# Patient Record
Sex: FEMALE | Race: WHITE | NOT HISPANIC OR LATINO | Employment: OTHER | URBAN - METROPOLITAN AREA
[De-identification: names, ages, dates, MRNs, and addresses within clinical notes are randomized per-mention and may not be internally consistent; named-entity substitution may affect disease eponyms.]

---

## 2017-03-13 ENCOUNTER — GENERIC CONVERSION - ENCOUNTER (OUTPATIENT)
Dept: OTHER | Facility: OTHER | Age: 82
End: 2017-03-13

## 2017-06-01 ENCOUNTER — GENERIC CONVERSION - ENCOUNTER (OUTPATIENT)
Dept: OTHER | Facility: OTHER | Age: 82
End: 2017-06-01

## 2017-07-19 ENCOUNTER — ALLSCRIPTS OFFICE VISIT (OUTPATIENT)
Dept: OTHER | Facility: OTHER | Age: 82
End: 2017-07-19

## 2017-07-19 DIAGNOSIS — R30.0 DYSURIA: ICD-10-CM

## 2017-07-19 DIAGNOSIS — T78.3XXA ANGIONEUROTIC EDEMA: ICD-10-CM

## 2017-07-27 ENCOUNTER — ALLSCRIPTS OFFICE VISIT (OUTPATIENT)
Dept: OTHER | Facility: OTHER | Age: 82
End: 2017-07-27

## 2017-08-03 ENCOUNTER — ALLSCRIPTS OFFICE VISIT (OUTPATIENT)
Dept: OTHER | Facility: OTHER | Age: 82
End: 2017-08-03

## 2017-08-07 ENCOUNTER — APPOINTMENT (OUTPATIENT)
Dept: LAB | Facility: CLINIC | Age: 82
End: 2017-08-07
Payer: MEDICARE

## 2017-08-07 ENCOUNTER — TRANSCRIBE ORDERS (OUTPATIENT)
Dept: LAB | Facility: CLINIC | Age: 82
End: 2017-08-07

## 2017-08-07 DIAGNOSIS — T78.3XXA ANGIONEUROTIC EDEMA: ICD-10-CM

## 2017-08-07 LAB
BASOPHILS # BLD AUTO: 0.02 THOUSANDS/ΜL (ref 0–0.1)
BASOPHILS NFR BLD AUTO: 0 % (ref 0–1)
EOSINOPHIL # BLD AUTO: 0.63 THOUSAND/ΜL (ref 0–0.61)
EOSINOPHIL NFR BLD AUTO: 5 % (ref 0–6)
ERYTHROCYTE [DISTWIDTH] IN BLOOD BY AUTOMATED COUNT: 14.3 % (ref 11.6–15.1)
HCT VFR BLD AUTO: 49.5 % (ref 34.8–46.1)
HGB BLD-MCNC: 15.9 G/DL (ref 11.5–15.4)
LYMPHOCYTES # BLD AUTO: 1.69 THOUSANDS/ΜL (ref 0.6–4.47)
LYMPHOCYTES NFR BLD AUTO: 13 % (ref 14–44)
MCH RBC QN AUTO: 27.8 PG (ref 26.8–34.3)
MCHC RBC AUTO-ENTMCNC: 32.1 G/DL (ref 31.4–37.4)
MCV RBC AUTO: 87 FL (ref 82–98)
MONOCYTES # BLD AUTO: 0.59 THOUSAND/ΜL (ref 0.17–1.22)
MONOCYTES NFR BLD AUTO: 5 % (ref 4–12)
NEUTROPHILS # BLD AUTO: 9.59 THOUSANDS/ΜL (ref 1.85–7.62)
NEUTS SEG NFR BLD AUTO: 77 % (ref 43–75)
NRBC BLD AUTO-RTO: 0 /100 WBCS
PLATELET # BLD AUTO: 196 THOUSANDS/UL (ref 149–390)
PMV BLD AUTO: 9.7 FL (ref 8.9–12.7)
RBC # BLD AUTO: 5.71 MILLION/UL (ref 3.81–5.12)
WBC # BLD AUTO: 12.6 THOUSAND/UL (ref 4.31–10.16)

## 2017-08-07 PROCEDURE — 82785 ASSAY OF IGE: CPT

## 2017-08-07 PROCEDURE — 36415 COLL VENOUS BLD VENIPUNCTURE: CPT

## 2017-08-07 PROCEDURE — 86003 ALLG SPEC IGE CRUDE XTRC EA: CPT

## 2017-08-07 PROCEDURE — 85025 COMPLETE CBC W/AUTO DIFF WBC: CPT

## 2017-08-08 ENCOUNTER — GENERIC CONVERSION - ENCOUNTER (OUTPATIENT)
Dept: OTHER | Facility: OTHER | Age: 82
End: 2017-08-08

## 2017-08-08 LAB
A-LACTALB IGE QN: 1.77 KAU/I
ALLERGEN COMMENT: ABNORMAL
ALMOND IGE QN: <0.1 KUA/I
B-LACTOGLOB IGE QN: 0.96 KAU/I
CASEIN IGE QN: 0.45 KAU/I
CASHEW NUT IGE QN: <0.1 KUA/I
CODFISH IGE QN: <0.1 KUA/I
EGG WHITE IGE QN: 1.54 KUA/I
GLUTEN IGE QN: <0.1 KUA/I
HAZELNUT IGE QN: <0.1 KUA/L
MILK IGE QN: 1.8 KUA/I
OVALB IGE QN: <0.1 KAU/I
OVOMUCOID IGE QN: <0.1 KAU/I
PEANUT IGE QN: <0.1 KUA/I
SALMON IGE QN: <0.1 KUA/I
SCALLOP IGE QN: <0.1 KUA/L
SESAME SEED IGE QN: <0.1 KUA/I
SHRIMP IGE QN: <0.1 KUA/L
SOYBEAN IGE QN: <0.1 KUA/I
TOTAL IGE SMQN RAST: 86.2 KU/L (ref 0–113)
TUNA IGE QN: <0.1 KUA/I
WALNUT IGE QN: <0.1 KUA/I
WHEAT IGE QN: 0.11 KUA/I

## 2017-10-17 ENCOUNTER — GENERIC CONVERSION - ENCOUNTER (OUTPATIENT)
Dept: OTHER | Facility: OTHER | Age: 82
End: 2017-10-17

## 2017-10-17 DIAGNOSIS — R06.02 SHORTNESS OF BREATH: ICD-10-CM

## 2017-10-20 ENCOUNTER — ALLSCRIPTS OFFICE VISIT (OUTPATIENT)
Dept: OTHER | Facility: OTHER | Age: 82
End: 2017-10-20

## 2017-12-06 ENCOUNTER — ALLSCRIPTS OFFICE VISIT (OUTPATIENT)
Dept: OTHER | Facility: OTHER | Age: 82
End: 2017-12-06

## 2017-12-06 ENCOUNTER — TRANSCRIBE ORDERS (OUTPATIENT)
Dept: ADMINISTRATIVE | Facility: HOSPITAL | Age: 82
End: 2017-12-06

## 2017-12-06 DIAGNOSIS — J44.9 OBSTRUCTIVE CHRONIC BRONCHITIS WITHOUT EXACERBATION (HCC): Primary | ICD-10-CM

## 2017-12-07 NOTE — CONSULTS
Assessment  1  Angioedema (995 1) (T78 3XXA)   2  Chronic obstructive pulmonary disease (496) (J44 9)    Plan  Chronic obstructive pulmonary disease    · Complete PFT with [facility]CO; Status:Hold For - Scheduling; Requested for:25Vmf2110;    Perform:[facility]; Due:43Tyy6580; Ordered;obstructive pulmonary disease; Ordered By:Irina Marino Re;   · Follow-up visit in 1 month Evaluation and Treatment  Follow-up  Status: Hold For -Scheduling  Requested for: 06OLD8119   Ordered;Chronic obstructive pulmonary disease; Ordered By: Lazarus Ransom Performed:  Due: 74PLK0680    Results/Data  Results Free Text Form [facility]:   Results  Chest X-ray done 6/16 at [facility], [facility]  Emphysema  Discussion/Summary  Discussion Summary:   I have reviewed this with the patient and her friend who is also her   Clinically she does have chronic obstructive pulmonary disease which is probably moderate to severe  However that does not explain her current symptoms which are intermittent in nature and mostly suggestive of angioedema  is currently on a nebulizer at home with ipratropium and albuterol and also has a Spiriva inhaler to use  I have scheduled the patient for complete pulmonary function studies although again I do not think this is her current problem   have recommended that she have an allergy evaluation which she tells me is being scheduled by her family [de-identified] call her with the results of the PFTs and I will see her back after that  Goals and Barriers: The patient has the current Goals: Breathe better  The patent has the current Barriers: Copd  Patient's Capacity to Self-Care: Patient is able to Self-Care  Patient Education: Educational resources provided: I have spoken to the patient and made recommendations and have given her advice regarding her chronic obstructive pulmonary disease  Medication SE Review and Pt Understands Tx: The treatment plan was reviewed with the patient/guardian   The patient/guardian understands and agrees with the treatment plan      Active Problems     · Abnormal weight loss (783 21) (R63 4)   · Acute anaphylaxis, initial encounter (995 0) (T78 2XXA)   · Acute bronchitis (466 0) (J20 9)   · Acute sinusitis (461 9) (J01 90)   · Allergic rhinitis (477 9) (J30 9)   · Anaphylactic reaction (995 0) (T78 2XXA)   · Angioedema (995 1) (T78 3XXA)   · Angioneurotic edema (995 1) (T78 3XXA)   · Arthralgia of left elbow (719 42) (M25 522)   · Arthritis, infective, 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)   · Asymptomatic menopausal state (V49 81) (Z78 0)   · Denied: History of Bladder Cancer (188 9)   · Bladder Polyps (236 7)   · Chronic cystitis (595 2) (N30 20)   · Chronic obstructive pulmonary disease (496) (J44 9)   · Cough (786 2) (R05)   · Deviated nasal septum (470) (J34 2)   · Drug eruption (693 0) (L27 0)   · Dyspnea (786 09) (R06 00)   · Dysuria (788 1) (R30 0)   · Elbow osteomyelitis, 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)   · Encounter for screening colonoscopy (V76 51) (Z12 11)   · Encounter for screening mammogram for malignant neoplasm of breast (V76 12)(Z12 31)   · 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)   · Gout (274 9) (M10 9)   · Gross hematuria (599 71) (R31 0)   · Herpes zoster (053 9) (B02 9)   · Hives (708 9) (L50 9)   · Hypertrophy, nasal, turbinate (478 0) (J34 3)   · Iliotibial band syndrome of left side (728 89) (M76 32)   · Impacted cerumen of left ear (380 4) (H61 22)   · Medial epicondylitis (726 31) (M77 00)   · 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)   · Need for revaccination (V05 9) (Z23)   · Screening for depression (V79 0) (Z13 89)   · Screening for genitourinary condition (V81 6) (Z13 89)   · Screening for neurological condition (V80 09) (Z13 89)   · Septic olecranon bursitis of left elbow (726 33,041 9) (M71 122)   · Shortness of breath (786 05) (R06 02)   · Sinus pressure (478 19) (J34 89)   · Sinusitis (473 9) (J32 9)   · Strain of left triceps tendon (840 8) (A80 509K)   · Urticaria (708 9) (L50 9)   · Vocal cord dysfunction (478 5) (J38 3)    History of Present Illness  HPI: I saw this patient in the office today her history of chronic obstructive pulmonary disease as well as a history of intermittent facial swelling including swelling of the tongue and throat are she has difficulty breathing  She has been having these episodes for the past 2 years  She attributes the beginning of this from an insect bite  She has been on prednisone on and off for the past 2 years and has actually developed visual problems from being on the prednisone  does have a history of smoking for 65 pack years  She tells me she quit about 3 years ago  She has no increased cough or sputum production  She has no hemoptysis or wheezing  She has some shortness of breath although tells me it is no worse now than it has been in the past       Review of Systems  Complete-Female - Pulm:  Constitutional: feeling poorly-- and-- feeling tired, but-- No fever, no chills, feels well, no tiredness, no recent weight gain or weight loss  Eyes: eyesight problems, but-- no complaints of vision problems  ENT: no rhinitis, no PND, no epistaxis  Cardiovascular: no palpitations, no chest pain  Respiratory: as noted in HPI  Past Medical History  1  Denied: History of Bladder Cancer (188 9)   2  Denied: History of mental disorder   3  History of nicotine dependence (V15 82) (Z87 891)   4  Denied: History of substance abuse  Active Problems And Past Medical History Reviewed: The active problems and past medical history were reviewed and updated today  Surgical History  1  History of  Section   2  History of Colon Surgery   3  History of Cystoscopy With Resection Of Tumor   4  History of Diagnostic Cystoscopy  Surgical History Reviewed: The surgical history was reviewed and updated today  Family History  Mother    1  Family history of Heart Disease (V17 49)   2  Family history of Stroke Syndrome (V17 1)  Father    3  Family history of Heart Disease (V17 49)  Family History    4  Denied: Family history of mental disorder   5  Denied: Family history of substance abuse    Social History     · Denied: History of Alcohol   · Current smoker (305 1) (F17 200)   · Former smoker (U65 94) (X37 369)  Social History Reviewed: The social history was reviewed and updated today  Current Meds   1  EpiPen 2-Ned 0 3 MG/0 3ML Injection Solution Auto-injector; Therapy: 74BUY2576 to Recorded   2  Eye Drops SOLN; Therapy: (Recorded:84Dog1605) to Recorded   3  Flovent Diskus 100 MCG/BLIST Inhalation Aerosol Powder Breath Activated; USE ONE INHALATION TWICE A DAY; Therapy: 64LTH5960 to (Evaluate:16Nov2017)  Requested for: 80USW7353; Last Rx:17Oct2017 Ordered   4  Fluticasone Propionate 50 MCG/ACT Nasal Suspension; USE 2 SPRAYS IN EACH NOSTRIL ONCE DAILY; Therapy: 18VGS5030 to (Jessica Layne)  Requested for: 92FLQ4307; Last Rx:20Oct2017 Ordered   5  Ipratropium-Albuterol 0 5-2 5 (3) MG/3ML Inhalation Solution; USE 1 UNIT DOSE IN NEBULIZER 4 TIMES DAILY; Therapy: 86MUW0374 to (Evaluate:15Apr2018)  Requested for: 42AAZ3245; Last Rx:17Oct2017 Ordered   6  LevoFLOXacin 500 MG Oral Tablet; TAKE 1 TABLET DAILY AS DIRECTED; Therapy: 22LUN4514 to (77 873 135)  Requested for: 95LJP4857; Last Rx:17Oct2017 Ordered   7  Mometasone Furoate 0 1 % External Ointment; APPLY SPARINGLY TO AFFECTED AREA(S) TWICE DAILY; Last Rx:67Jcq4732 Ordered   8  PredniSONE 20 MG Oral Tablet; 3 Tabs PO QDay x 4 Days 2 Tabs PO QDay x 4 Days 1 Tab PO QDay x 4 Days 1/2 Tab PO QDay x4Days; Therapy: 38COX9502 to (Complete:36Grw2748)  Requested for: 17WYO8153; Last Rx:10Nov2017 Ordered   9  Spiriva HandiHaler 18 MCG Inhalation Capsule; INHALE CONTENTS OF 1 CAPSULE ONCE DAILY; Therapy: 36QUC4072 to (Evaluate:07Viw8154)  Requested for: 08Ejw4869; Last Rx:97Hqb0991 Ordered   10   Ventolin  (90 Base) MCG/ACT Inhalation Aerosol Solution; INHALE 1 TO 2 PUFFS  EVERY 4 TO 6 HOURS AS NEEDED; Therapy: 72HAK5959 to (Evaluate:20Agu7891)  Requested for: 33Bvo6117; Last  Rx:77Fpq5966 Ordered   11  ZyrTEC Allergy 10 MG Oral Tablet; 2 in am and 2 at night; Therapy: 50LGP7671 to Recorded    Allergies  1  No Known Drug Allergies  2  Seasonal    Vitals  Vital Signs    Recorded: 62DAY2179 11:44AM   Heart Rate 078   Systolic 437   Diastolic 80   Height 5 ft    Weight 158 lb    BMI Calculated 30 86   BSA Calculated 1 69   O2 Saturation 89, RA       Physical Exam   Constitutional  General appearance: Abnormal   chronically ill-- and-- appears older than stated age  Eyes  Examination of pupil and irises: Abnormal-- (bilateral arcus senilis)  Ears, Nose, Mouth, and Throat  Nasal mucosa, septum, and turbinates: Normal without edema or erythema  Lips, teeth, and gums: Normal, good dentition  Oropharynx: Normal with no erythema, edema, exudate or lesions  Neck  Neck: Supple, symmetric, trachea midline, no masses  Jugular veins: Normal    Pulmonary  Auscultation of lungs: Abnormal   Auscultation of the lungs revealed decreased breath sounds diffusely  Cardiovascular  Auscultation of heart: Normal rate and rhythm, normal S1 and S2, no murmurs  Examination of extremities for edema and/or varicosities: Normal    Abdomen  Abdomen: Abnormal   The abdomen was rounded  Lymphatic  Palpation of lymph nodes in neck: No lymphadenopathy  Musculoskeletal  Gait and station: Normal    Digits and nails: Normal without clubbing or cyanosis  Neurologic  Mental Status: Normal  Not confused, no evidence of dementia, good comprehension, good concentration  Skin  Skin and subcutaneous tissue: Limited exam shows no rash  Psychiatric  Orientation to person, place and time: Normal    Mood and affect: Normal        Future Appointments    Date/Time Provider Specialty Site   12/22/2017 10:00 AM NEISHA Ying   Otolaryngology [address] [facility]       Signatures   Electronically signed by : Curtis Benitez M D ; Dec  6 2017 12:38PM EST                       (Author)

## 2017-12-13 ENCOUNTER — GENERIC CONVERSION - ENCOUNTER (OUTPATIENT)
Dept: PULMONOLOGY | Facility: HOSPITAL | Age: 82
End: 2017-12-13

## 2017-12-13 ENCOUNTER — HOSPITAL ENCOUNTER (OUTPATIENT)
Dept: PULMONOLOGY | Facility: HOSPITAL | Age: 82
Discharge: HOME | End: 2017-12-13
Attending: INTERNAL MEDICINE
Payer: MEDICARE

## 2017-12-13 DIAGNOSIS — J44.9 OBSTRUCTIVE CHRONIC BRONCHITIS WITHOUT EXACERBATION (HCC): ICD-10-CM

## 2017-12-13 PROCEDURE — 94729 DIFFUSING CAPACITY: CPT

## 2017-12-13 PROCEDURE — 94060 EVALUATION OF WHEEZING: CPT

## 2017-12-13 PROCEDURE — 94727 GAS DIL/WSHOT DETER LNG VOL: CPT

## 2017-12-13 PROCEDURE — 94760 N-INVAS EAR/PLS OXIMETRY 1: CPT

## 2017-12-13 RX ORDER — ALBUTEROL SULFATE 2.5 MG/3ML
2.5 SOLUTION RESPIRATORY (INHALATION) ONCE AS NEEDED
Status: DISCONTINUED | OUTPATIENT
Start: 2017-12-13 | End: 2017-12-17 | Stop reason: HOSPADM

## 2017-12-19 ENCOUNTER — GENERIC CONVERSION - ENCOUNTER (OUTPATIENT)
Dept: OTHER | Facility: OTHER | Age: 82
End: 2017-12-19

## 2018-01-10 NOTE — RESULT NOTES
Verified Results  (1) URIC ACID 20Jul2016 11:07AM Tashia Bonilla Order Number: [id number]   Order Number: [id number]     Test Name Result Flag Reference   URIC ACID 2 8 mg/dL  2 0-6 8   Specimen collection should occur prior to Metamizole administration due to the potential for falsely depressed results  (1) CBC/PLT/DIFF 20Jul2016 11:07AM Tashia Bonilla Order Number: [id number]     Test Name Result Flag Reference   WBC COUNT 11 24 Thousand/uL H 4 31-10 16   RBC COUNT 5 16 Million/uL H 3 81-5 12   HEMOGLOBIN 13 3 g/dL  11 5-15 4   HEMATOCRIT 42 7 %  34 8-46  1   MCV 83 fL  82-98   MCH 25 8 pg L 26 8-34 3   MCHC 31 1 g/dL L 31 4-37 4   RDW 15 7 % H 11 6-15 1   MPV 9 4 fL  8 9-12 7   PLATELET COUNT 797 Thousands/uL  149-390   - Patient Instructions: This bloodwork is non-fasting  Please drink two glasses of water morning of bloodwork  This is an appended report  These results have been appended to a previously verified report  NEUTROPHILS - REL 83 % H 43-75   This is an appended report  These results have been appended to a previously final verified report  LYMPHOCYTES - REL 11 % L 14-44   This is an appended report  These results have been appended to a previously final verified report  MONOCYTES - REL 6 %  4-12   This is an appended report  These results have been appended to a previously final verified report  TOTAL COUNTED      PLT ESTIMATE Adequate  Adequate   - Patient Instructions: This bloodwork is non-fasting  Please drink two glasses of water morning of bloodwork       (1) SED RATE 20Jul2016 11:07AM Tashia Chad Order Number: [id number]     Test Name Result Flag Reference   SED RATE 26 mm/hour H 0-20     (1) SYNOVIAL FLUID CRYSTALS 20Jul2016 11:07AM Tashiachinmay Bonilla Order Number: [id number]     Test Name Result Flag Reference   SYN FL CRYST No Crystals Seen  No Crystals Seen     (1) WBC AND DIFF,SYNOVIAL FLUID 20Jul2016 11: Kyra Marie Order Number: [id number]     Test Name Result Flag Reference   POLYS FLUID 93 %     MONOCYTES FLUID 7 %     SITE      WBC FLUID 252020 Girma Torres

## 2018-01-11 NOTE — MISCELLANEOUS
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)   8  Asymptomatic menopausal state (V49 81) (Z78 0)   9  Denied: History of Bladder Cancer (188 9)   10  Bladder Polyps (236 7)   11  Chronic cystitis (595 2) (N30 20)   12  Chronic obstructive pulmonary disease (496) (J44 9)   13  Cough (786 2) (R05)   14  Drug eruption (693 0) (L27 0)   15  Dyspnea (786 09) (R06 00)   16  Dysuria (788 1) (R30 0)   17  Elbow osteomyelitis, 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)   18  Encounter for screening colonoscopy (V76 51) (Z12 11)   19  Encounter for screening mammogram for malignant neoplasm of breast (V76 12)    (Z12 31)   20  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)   21  Gout (274 9) (M10 9)   22  Gross hematuria (599 71) (R31 0)   23  Herpes zoster (053 9) (B02 9)   24  Hives (708 9) (L50 9)   25  Iliotibial band syndrome of left side (728 89) (M76 32)   26  Medial epicondylitis (726 31) (M77 00)   27  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)   28  Need for revaccination (V05 9) (Z23)   29  Screening for depression (V79 0) (Z13 89)   30  Septic olecranon bursitis of left elbow (726 33,041 9) (M70 22,B96 89)   31  Shortness of breath (786 05) (R06 02)   32  Sinus pressure (478 19) (J34 89)   33  Sinusitis (473 9) (J32 9)   34  Strain of left triceps tendon (840 8) (S46 312A)   35  Urticaria (708 9) (L50 9)    Current Meds   1  EpiPen 2-Ned 0 3 MG/0 3ML Injection Solution Auto-injector; Therapy: 37ARS5251 to Recorded   2   Eye Drops SOLN;   Therapy: (Recorded:28Kup6865) to Recorded   3  Ipratropium-Albuterol 0 5-2 5 (3) MG/3ML Inhalation Solution; USE 1 UNIT DOSE IN   NEBULIZER 4 TIMES DAILY; Therapy: 42OPD8566 to (Evaluate:18Nov2017)  Requested for: 05OEG8796; Last   Rx:22Uxf2093 Ordered   4  Mometasone Furoate 0 1 % External Ointment; APPLY SPARINGLY TO AFFECTED   AREA(S) TWICE DAILY; Last Rx:66Hhm8189 Ordered   5  Ventolin  (90 Base) MCG/ACT Inhalation Aerosol Solution; INHALE 1 TO 2   PUFFS EVERY 4 TO 6 HOURS AS NEEDED; Therapy: 70FCC2170 to (Evaluate:12Jun2017)  Requested for: 53MWI1735; Last   Rx:64Rkd2478 Ordered    Allergies    1  No Known Drug Allergies    2   Seasonal    Signatures   Electronically signed by : Romana Lisle, ; Jun 1 2017 10:43AM EST                       (Author)

## 2018-01-12 ENCOUNTER — OFFICE VISIT (OUTPATIENT)
Dept: URGENT CARE | Facility: CLINIC | Age: 83
End: 2018-01-12
Payer: MEDICARE

## 2018-01-12 PROCEDURE — G0463 HOSPITAL OUTPT CLINIC VISIT: HCPCS

## 2018-01-12 PROCEDURE — 99213 OFFICE O/P EST LOW 20 MIN: CPT

## 2018-01-12 NOTE — CONSULTS
Chief Complaint  Chief Complaint Free Text Note Form: ALLERGIES/REF BY DR Farhad Cabral      History of Present Illness  HPI: Milagros Westfall is a [de-identified] YOF presents for evaluation of allergies  Reports difficulty breathing around her carpet and thinks she is allergic to dust  Reports ongoing nasal congestion  Reports difficulty breathing around strong cleaning solution odors  Denies prior allergy testing ,itchy/watery eyes, sneezing and rhinorrhea  Former smoker of 65 years  Reports poor vision from prior prednisone use  Review of Systems  Complete ENT ROS [facility]:   Eyes: vision change  Ears: hearing loss  Nose: SORES BLOCKED  Mouth: No sores in mouth, no altered taste, no dental problems  Throat: STARTS TO CLOSE  Neck: No neck soreness, no neck pain, no neck lumps or swelling  Genitourinary: No complaints of dysuria, flank pain or frequent urination  Cardiovascular: No complaints of chest pain or palpitations  Respiratory: shortness of breath  Gastrointestinal: No complaints of heartburn, nausea/vomiting, or constipation  Neurological: No complaints of headache, convulsions or memory loss  ROS Reviewed:   ROS reviewed  Active Problems    1  Abnormal weight loss (783 21) (R63 4)   2  Acute anaphylaxis, initial encounter (995 0) (T78 2XXA)   3  Acute bronchitis (466 0) (J20 9)   4  Acute sinusitis (461 9) (J01 90)   5  Anaphylactic reaction (995 0) (T78 2XXA)   6  Angioedema (995 1) (T78 3XXA)   7  Angioneurotic edema (995 1) (T78 3XXA)   8  Arthralgia of left elbow (719 42) (M25 522)   9  Arthritis, infective, 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)   10  Asymptomatic menopausal state (V49 81) (Z78 0)   11  Denied: History of Bladder Cancer (188 9)   12  Bladder Polyps (236 7)   13  Chronic cystitis (595 2) (N30 20)   14  Chronic obstructive pulmonary disease (496) (J44 9)   15  Cough (786 2) (R05)   16  Drug eruption (693 0) (L27 0)   17  Dyspnea (786 09) (R06 00)   18  Dysuria (788 1) (R30 0)   19   Elbow osteomyelitis, 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)   20  Encounter for screening colonoscopy (V76 51) (Z12 11)   21  Encounter for screening mammogram for malignant neoplasm of breast (V76 12)    (Z12 31)   22  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)   23  Gout (274 9) (M10 9)   24  Gross hematuria (599 71) (R31 0)   25  Herpes zoster (053 9) (B02 9)   26  Hives (708 9) (L50 9)   27  Iliotibial band syndrome of left side (728 89) (M76 32)   28  Medial epicondylitis (726 31) (M77 00)   29  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)   30  Need for revaccination (V05 9) (Z23)   31  Screening for depression (V79 0) (Z13 89)   32  Screening for genitourinary condition (V81 6) (Z13 89)   33  Screening for neurological condition (V80 09) (Z13 89)   34  Septic olecranon bursitis of left elbow (726 33,041 9) (M71 122)   35  Shortness of breath (786 05) (R06 02)   36  Sinus pressure (478 19) (J34 89)   37  Sinusitis (473 9) (J32 9)   38  Strain of left triceps tendon (840 8) (S46 312A)   39  Urticaria (708 9) (L50 9)    Past Medical History    1  Denied: History of Bladder Cancer (188 9)   2  Denied: History of mental disorder   3  History of nicotine dependence (V15 82) (Z87 891)   4  Denied: History of substance abuse  Past Medical History Reviewed: The past medical history was reviewed and updated today  Surgical History    1  History of  Section   2  History of Colon Surgery   3  History of Cystoscopy With Resection Of Tumor   4  History of Diagnostic Cystoscopy  Surgical History Reviewed: The surgical history was reviewed and updated today  Family History    1  Family history of Heart Disease (V17 49)   2  Family history of Stroke Syndrome (V17 1)    3  Family history of Heart Disease (V17 49)    4  Denied: Family history of mental disorder   5  Denied: Family history of substance abuse  Family History Reviewed: The family history was reviewed and updated today         Social History    · Denied: History of Alcohol   · Current smoker (305 1) (F17 200)   · Former smoker (E99 68) (H47 167)  Social History Reviewed: The social history was reviewed and updated today  The social history was reviewed and is unchanged  Current Meds   1  EpiPen 2-Ned 0 3 MG/0 3ML Injection Solution Auto-injector; Therapy: 29SAL5144 to Recorded   2  Eye Drops SOLN;   Therapy: (Recorded:42Lki2344) to Recorded   3  Flovent Diskus 100 MCG/BLIST Inhalation Aerosol Powder Breath Activated; USE ONE   INHALATION TWICE A DAY; Therapy: 82XUE5372 to (Evaluate:16Nov2017)  Requested for: 54JOJ2445; Last   Rx:17Oct2017 Ordered   4  Ipratropium-Albuterol 0 5-2 5 (3) MG/3ML Inhalation Solution; USE 1 UNIT DOSE IN   NEBULIZER 4 TIMES DAILY; Therapy: 73BZD6843 to (Evaluate:15Apr2018)  Requested for: 37WSS0660; Last   Rx:17Oct2017 Ordered   5  LevoFLOXacin 500 MG Oral Tablet; TAKE 1 TABLET DAILY AS DIRECTED; Therapy: 13SHN7383 to (05 12 73 93 30)  Requested for: 27IAR9969; Last   Rx:17Oct2017 Ordered   6  Mometasone Furoate 0 1 % External Ointment; APPLY SPARINGLY TO AFFECTED   AREA(S) TWICE DAILY; Last Rx:62Qoa8158 Ordered   7  PredniSONE 10 MG Oral Tablet; 3 Tabs PO QDay x 3 Days, 2 Tabs PO QDay x 3 Days, 1   Tab PO QDay x 3 Days, 1/2 Tab PO QDay x 4 Days; Therapy: 20VMV5437 to (Complete:30Oct2017)  Requested for: 51LON4484; Last   Rx:17Oct2017 Ordered   8  Spiriva HandiHaler 18 MCG Inhalation Capsule; INHALE CONTENTS OF 1 CAPSULE   ONCE DAILY; Therapy: 07IUN0013 to (Evaluate:01Gfd3345)  Requested for: 67Xyi6580; Last   Rx:74Cjc7025 Ordered   9  Ventolin  (90 Base) MCG/ACT Inhalation Aerosol Solution; INHALE 1 TO 2 PUFFS   EVERY 4 TO 6 HOURS AS NEEDED; Therapy: 01IPR7812 to (Evaluate:09Gjn7335)  Requested for: 77Oeh0966; Last   Rx:59Jsr9882 Ordered   10  ZyrTEC Allergy 10 MG Oral Tablet; 2 in am and 2 at night; Therapy: 31RBQ5669 to Recorded    Allergies    1  No Known Drug Allergies    2   Seasonal    Vitals  Signs   Recorded: 00DZR5779 10:55AM   Heart Rate: 83  Systolic: 335, LUE, Sitting  Diastolic: 86, LUE, Sitting  Height: 5 ft   Weight: 149 lb   BMI Calculated: 29 1  BSA Calculated: 1 65  O2 Saturation: 95    Physical Exam    Constitutional:   General appearance: Well developed, well nourished  Ability to communicate: Voice normal  Speech normal    Head and Face:   Head and face: Head normocephalic, atraumatic with no lesions or palpable masses  Palpation of the face for sinus tenderness: No sinus tenderness  Submandibular glands and parotid glands: non tender, no masses  Ears:   Otoscopic examination: Abnormal  left eac cerumen impaction s/p debridement tms are clear and intact  Hearing: Normal    Nose:   External auditory canals: No cerumen impaction noted, no drainage observed, no edema noted in EAC, no exostoses present, no osteoma present, no tenderness noted  External Inspection of Nose: No deformities observed, no deviation of bone structure, no skin lesion present, no swelling present  Nares are symmetric, no deviation of caudal portion of septum  Nasal mucosa, septum, and turbinates: Abnormal  turbinate hypertrophy, left deviated nasal septum  Mouth:   Lips, teeth, and gums: Abnormal    Throat:   Examination of Oropharynx: Oral Mucosa: no masses, lesions, leukoplakia, or scarring  Normal Modesta's ducts, pink and moist, no discoloration noted  Floor of mouth: normal Warthin's ducts, no lesions, ulcerations, leukoplakia or torus mandibularis  Tonsils: no hypertrophy or ulcerations noted  Tongue: normal mobility, surfaces without fissures, leukoplakia, ulceration or masses, not enlarged, no pallor noted, no white patches present  Inspect Pharyngeal Walls/Pyriform Sinus: No edema of posterior pharyngeal walls observed  Neck:   Examination of Neck: No decreased range of motion, trachea midline  Examination of Thyroid: Normal size, non-tender, no palpable masses     Pulmonary:   Respiratory effort: Normal respiratory rate and rhythm, no increased work of breathing  Cardiovascular:   Inspection of Peripheral vascular system by observation: Normal    Lymphatic:   Palpation of Lymph Nodes: Neck: No generalized lymphadenopathy  Neurological/Psychiatric:   Cranial nerves II-VII grossly intact  Oriented to person, place, and time  Cooperative, in no acute distress  Procedure  Procedure: Flexible fiberoptic laryngoscopy    Indications: Evaluation of the upper aerodigestive tract    Procedure in detail: After informed verbal consent was obtained the nose was anesthetized with topical lidocaine and phenylephrine  After adequate time for anesthesia the upper aerodigestive tract was evaluated using the endoscope including the nasopharynx, oropharynx, hypopharynx, and larynx with the below listed findings  Scope was placed in the right and left nasal cavity  The nasal cavity was evaluated as well  The scope was removed  The patient tolerated the procedure well and discharged in stable condition  Findings:  No significant mucopurulence or polyposis in bilateral nasal cavities  No lesions or masses of the nasopharynx, oropharynx, hypopharynx, or subglottis  Paradoxical movement of vocal cords  No erythema of true and false vocal cords  No significant lesions or masses of the base of tongue, epiglottis, piriform sinuses, pharyngeal walls, or glossopharyngeal folds  Procedure: Cerumen debridement left EAC    Indications: Cerumen impaction     Procedure in detail: After informed verbal consent was obtained the ear was visualized using microscopy  Using cerumen loop, suction, and alligator forceps the cerumen was debrided and the findings below were seen  The patient tolerated the procedure well  FINDINGS:  Tympanic membrane is intact with intact normal landmarks  Assessment    1  History of nicotine dependence (V15 82) (Z87 891)   2  Former smoker (V15 82) (Q19 903)   3   Deviated nasal septum (470) (J34 2)   4  Hypertrophy, nasal, turbinate (478 0) (J34 3)   5  Vocal cord dysfunction (478 5) (J38 3)   6  Allergic rhinitis (477 9) (J30 9)   7  Impacted cerumen of left ear (380 4) (H61 22)    Plan    1  Fluticasone Propionate 50 MCG/ACT Nasal Suspension; USE 2 SPRAYS IN EACH   NOSTRIL ONCE DAILY    2  1 - Erlene Inch  (Pulmonary Medicine) Co-Management  *  Status: Hold For -   Scheduling  Requested for: 45ZPI7173  Care Summary provided  : Yes    3  Follow-up visit in 2 months Evaluation and Treatment  Follow-up  Status: Hold For -   Scheduling  Requested for: 05LNG6722    Discussion/Summary  Discussion Summary:   We discussed ongoing management of vocal cord dysfunction and allergic rhinitis  She feels that her problems are due to allergy despite significant asthma similarities and no previous testing for any cause of lung dysfunction  She is fairly reticent to have any treatment  We taught VCD breathing techniques including pursed lip breathing as this can be very helpful for any dyspnea as it tends to slow respiration down and improve ventilation  We recommended Flonase daily as well as referred her to Pulmonary for evaluation of likely COPD and possible lung cause of dyspnea  We discussed ongoing management of cerumen  F/U in 2 months or sooner with any concerns        Signatures   Electronically signed by : NEISHA Kohli ; Oct 20 2017  4:17PM EST                       (Author)

## 2018-01-13 NOTE — PROGRESS NOTES
Assessment   1  Bursitis of right elbow (096 33) (M70 31)   2  Cellulitis of arm, right (682 3) (L03 113)    Plan   Cellulitis of arm, right    · Amoxicillin-Pot Clavulanate 875-125 MG Oral Tablet; TAKE 1 TABLET EVERY 12    HOURS DAILY    Discussion/Summary   Discussion Summary:    Discussed diagnosis of bursitis of right elbow and diagnosis of cellulitis to right upper arm instructed patient to use over-the-counter NSAIDs as needed rest ice elevate right elbow for cellulitis will treat with Augmentin p o  b i d  for 10 days and instructed to follow up with PCP in 1-2 days or report to emergency room if symptoms worsen  Medication Side Effects Reviewed: Possible side effects of new medications were reviewed with the patient/guardian today  Understands and agrees with treatment plan: The treatment plan was reviewed with the patient/guardian  The patient/guardian understands and agrees with the treatment plan    Counseling Documentation With Imm: The patient, patient's family was counseled regarding instructions for management,-- patient and family education,-- importance of compliance with treatment  total time of encounter was 25 minutes-- and-- 10 minutes was spent counseling  Follow Up Instructions: Follow Up with your Primary Care Provider in 3-5 days  If your symptoms worsen, go to the nearest [place] [address] Emergency Department  Chief Complaint   1  Elbow Problem  Chief Complaint Free Text Note Form: 2 days of a swollen right elbow that she feels needs to be drained'      History of Present Illness   HPI: 51-year-old female urgent care with chief complaint of swelling redness to right elbow has not used any over-the-counter medication no ice reports no improvement in symptoms denies any injury to site    Hospital Based Practices Required Assessment:      Pain Assessment      the patient states they do not have pain        Abuse And Domestic Violence Screen       Yes, the patient is safe at home -- The patient states no one is hurting them  Depression And Suicide Screen  No, the patient has not had thoughts of hurting themself  No, the patient has not felt depressed in the past 7 days  Readiness To Learn: Receptive  Barriers To Learning: none  Preferred Learning: verbal      Education Completed: disease/condition,-- medications-- and-- further treatment/follow-up      Teaching Method: verbal      Person Taught: patient      Evaluation Of Learning: verbalized/demonstrated understanding      Review of Systems   Focused-Female:      Constitutional: No fever, no chills, feels well, no tiredness, no recent weight gain or loss  ENT: no ear ache, no loss of hearing, no nosebleeds or nasal discharge, no sore throat or hoarseness  Cardiovascular: no complaints of slow or fast heart rate, no chest pain, no palpitations, no leg claudication or lower extremity edema  Respiratory: no complaints of shortness of breath, no wheezing, no dyspnea on exertion, no orthopnea or PND  Breasts: no complaints of breast pain, breast lump or nipple discharge  Gastrointestinal: no complaints of abdominal pain, no constipation, no nausea or diarrhea, no vomiting, no bloody stools  Genitourinary: no complaints of dysuria, no incontinence, no pelvic pain, no dysmenorrhea, no vaginal discharge or abnormal vaginal bleeding  Musculoskeletal: as noted in HPI  Integumentary: as noted in HPI  Neurological: no complaints of headache, no confusion, no numbness or tingling, no dizziness or fainting  ROS Reviewed:    ROS reviewed  Active Problems   1  Abnormal weight loss (783 21) (R63 4)   2  Acute anaphylaxis, initial encounter (995 0) (T78 2XXA)   3  Acute bronchitis (466 0) (J20 9)   4  Acute sinusitis (461 9) (J01 90)   5  Allergic rhinitis (477 9) (J30 9)   6  Anaphylactic reaction (995 0) (T78 2XXA)   7  Angioedema (995 1) (T78 3XXA)   8   Angioneurotic edema (995  1) (T78 3XXA)   9  Arthralgia of left elbow (719 42) (M25 522)   10  Arthritis, infective, 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)   11  Asymptomatic menopausal state (V49 81) (Z78 0)   12  Denied: History of Bladder Cancer (188 9)   13  Bladder Polyps (236 7)   14  Chronic cystitis (595 2) (N30 20)   15  Chronic obstructive pulmonary disease (496) (J44 9)   16  Cough (786 2) (R05)   17  Deviated nasal septum (470) (J34 2)   18  Drug eruption (693 0) (L27 0)   19  Dyspnea (786 09) (R06 00)   20  Dysuria (788 1) (R30 0)   21  Elbow osteomyelitis, 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)   22  Encounter for screening colonoscopy (V76 51) (Z12 11)   23  Encounter for screening mammogram for malignant neoplasm of breast (V76 12)      (Z12 31)   24  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)   25  Gout (274 9) (M10 9)   26  Gross hematuria (599 71) (R31 0)   27  Herpes zoster (053 9) (B02 9)   28  Hives (708 9) (L50 9)   29  Hypertrophy, nasal, turbinate (478 0) (J34 3)   30  Iliotibial band syndrome of left side (728 89) (M76 32)   31  Impacted cerumen of left ear (380 4) (H61 22)   32  Medial epicondylitis (726 31) (M77 00)   33  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)   34  Need for revaccination (V05 9) (Z23)   35  Screening for depression (V79 0) (Z13 89)   36  Screening for genitourinary condition (V81 6) (Z13 89)   37  Screening for neurological condition (V80 09) (Z13 89)   38  Septic olecranon bursitis of left elbow (726 33,041 9) (M71 122)   39  Shortness of breath (786 05) (R06 02)   40  Sinus pressure (478 19) (J34 89)   41  Sinusitis (473 9) (J32 9)   42  Strain of left triceps tendon (840 8) (S46 312A)   43  Urticaria (708 9) (L50 9)   44  Vocal cord dysfunction (478 5) (J38 3)    Past Medical History   1  Denied: History of Bladder Cancer (188 9)   2  Denied: History of mental disorder   3  History of nicotine dependence (V15 82) (Z87 891)   4   Denied: History of substance abuse  Active Problems And Past Medical History Reviewed: The active problems and past medical history were reviewed and updated today  Family History   Mother    1  Family history of Heart Disease (V17 49)   2  Family history of Stroke Syndrome (V17 1)  Father    3  Family history of Heart Disease (V17 49)  Family History    4  Denied: Family history of mental disorder   5  Denied: Family history of substance abuse  Family History Reviewed: The family history was reviewed and updated today  Social History    · Denied: History of Alcohol   · Current smoker (305 1) (F17 200)   · Former smoker (X42 88) (B36 568)  Social History Reviewed: The social history was reviewed and updated today  The social history was reviewed and is unchanged  Surgical History   1  History of  Section   2  History of Colon Surgery   3  History of Cystoscopy With Resection Of Tumor   4  History of Diagnostic Cystoscopy  Surgical History Reviewed: The surgical history was reviewed and updated today  Current Meds    1  EpiPen 2-Ned 0 3 MG/0 3ML Injection Solution Auto-injector; Therapy: 28RJP4799 to Recorded   2  Eye Drops SOLN;     Therapy: (Recorded:25Chi4142) to Recorded   3  Flovent Diskus 100 MCG/BLIST Inhalation Aerosol Powder Breath Activated; USE ONE     INHALATION TWICE A DAY; Therapy: 24WLV3631 to (Evaluate:2017)  Requested for: 67TPF9852; Last     Rx:2017 Ordered   4  Fluticasone Propionate 50 MCG/ACT Nasal Suspension; USE 2 SPRAYS IN EACH     NOSTRIL ONCE DAILY; Therapy: 14LWX0491 to (Jeb Sandra)  Requested for: 71KRS8652; Last     Rx:2017 Ordered   5  Ipratropium-Albuterol 0 5-2 5 (3) MG/3ML Inhalation Solution; USE 1 UNIT DOSE IN     NEBULIZER 4 TIMES DAILY; Therapy: 37BHQ9552 to (Evaluate:2018)  Requested for: 92UOZ1748; Last     Rx:2017 Ordered   6  Mometasone Furoate 0 1 % External Ointment; APPLY SPARINGLY TO AFFECTED     AREA(S) TWICE DAILY; Last Rx:78Jgm5775 Ordered   7   Spiriva HandiHaler 18 MCG Inhalation Capsule; INHALE CONTENTS OF 1 CAPSULE     ONCE DAILY; Therapy: 68EDI7552 to (Evaluate:46Jhi1140)  Requested for: 65Aiw8000; Last     Rx:85Xdt8952 Ordered   8  Ventolin  (90 Base) MCG/ACT Inhalation Aerosol Solution; INHALE 1 TO 2 PUFFS     EVERY 4 TO 6 HOURS AS NEEDED; Therapy: 71GEV8199 to (Evaluate:67Ajm2528)  Requested for: 53Lag1394; Last     Rx:16Eso2280 Ordered   9  ZyrTEC Allergy 10 MG Oral Tablet; 2 in am and 2 at night; Therapy: 30SXZ3640 to Recorded  Medication List Reviewed: The medication list was reviewed and updated today  Allergies   1  No Known Drug Allergies  2  Seasonal    Vitals   Signs   Recorded: 12Jan2018 10:13AM   Temperature: 99 4 F  Heart Rate: 116  Respiration: 16  Systolic: 972  Diastolic: 60  O2 Saturation: 87    Physical Exam        Constitutional      General appearance: No acute distress, well appearing and well nourished  Eyes      Conjunctiva and lids: No swelling, erythema or discharge  Pupils and irises: Equal, round and reactive to light  Ears, Nose, Mouth, and Throat      External inspection of ears and nose: Normal        Otoscopic examination: Tympanic membranes translucent with normal light reflex  Canals patent without erythema  Nasal mucosa, septum, and turbinates: Normal without edema or erythema  Oropharynx: Normal with no erythema, edema, exudate or lesions  Pulmonary      Respiratory effort: No increased work of breathing or signs of respiratory distress  Auscultation of lungs: Clear to auscultation  Cardiovascular      Palpation of heart: Normal PMI, no thrills  Auscultation of heart: Normal rate and rhythm, normal S1 and S2, without murmurs  Examination of extremities for edema and/or varicosities: Normal        Abdomen      Abdomen: Non-tender, no masses  Liver and spleen: No hepatomegaly or splenomegaly         Lymphatic      Palpation of lymph nodes in neck: No lymphadenopathy  Musculoskeletal      Gait and station: Normal        Digits and nails: Normal without clubbing or cyanosis  Inspection/palpation of joints, bones, and muscles: Normal        Skin      Skin and subcutaneous tissue: Abnormal  -- right elbow approximately 3 cm of erythema warmth and tenderness to touch noted fluid to right elbow also  Neurologic      Cranial nerves: Cranial nerves 2-12 intact  Reflexes: 2+ and symmetric  Sensation: No sensory loss         Psychiatric      Orientation to person, place, and time: Normal        Mood and affect: Normal        Signatures    Electronically signed by : Laurie Warner NP; Jan 12 2018 10:30AM EST                       (Author)     Electronically signed by : PASQUALE Myles ; Jan 12 2018  4:14PM EST                       (Co-author)

## 2018-01-13 NOTE — RESULT NOTES
Verified Results  (1) ALLERGY, FOOD PANEL [id number] 08:07AM Darrell Coronel Order Number: [id number]     Test Name Result Flag Reference   FISH COD <0 10 kUA/I  0 00-0 09   EGG WHITE 1 54 kUA/I H 0 00-0 09   GLUTEN <0 10 kUA/I  0 00-0 09   MILK 1 80 kUA/I H 0 00-0 09   PEANUT <0 10 kUA/I  0 00-0 09   F041 SALMON <0 10 kUA/I  0 00-0 09   SCALLOP <0 10 kUA/l  0 00-0 09   SESAME <0 10 kUA/I  0 00-0 09   SHRIMP <0 10 kUA/l  0 00-0 09   SOYBEAN <0 10 kUA/I  0 00-0 09   ALLERGEN TUNA (F40) IGE <0 10 kUA/I  0 00-0 09   WALNUT <0 10 kUA/I  0 00-0 09   WHEAT 0 11 kUA/I H 0 00-0 09   TOTAL IGE 86 2 kU/l  0-113   ALLERGEN ALMONDS <0 10 kUA/I  0 00-0 09   ALLERGEN CASHEW <0 10 kUA/I  0 00-0 09   ALLERGEN HAZELNUT/FILBERT IGE <0 10 kUA/l  0 00-0 09   ALLERGEN COMMENT See Below     As in all diagnostic testing, a diagnosis should be made by the physician based on both test results and patient clinical history  F232 OVALBUMIN <0 10 kAU/I  0 00-0 09   F233 OVOMUCOID <0 10 kAU/I  0 00-0 09   ====================================================  Ovalbumin  f232 + F Ovomucoid f233 -  F    Lower risk of reaction to baked (extensively heated) egg  May tolerate                  baked egg, but not uncooked eggs  Likely to develop tolerance  *********************************************************************    ====================================================  Ovalbumin [id number] +/-  F Ovomucoid  f233 + F    Less likely to develop tolerance  High risk of reaction to all forms   of Egg    *********************************************************************    As in all diagnostic testing, a diagnosis should be made by the   physician based on both test results and patient clinical history     ALLERGEN, ALPHA LACTALBUMIN 1 77 kAU/I H 0 00-0 09   ALLERGEN, BETA LACTOGLOBULIN 0 96 kAU/I H 0 00-0 09   ALLERGEN CASEIN 0 45 kAU/I H 0 00-0 09 ==========================================================================  Alpha-LactalbuminFBeta-LactoglobulinFCaesin F                               (f76)        F        (f77)      F (f78) F                         ==========================================================================        +/-        F         +/-      F   -   F May tolerate baked milk products  Likely to develop tolerance  ---------------------------------------------------------------------------------        +/-        F         +/-      F    +   FHigher risk of reaction to all forms of                        F                  F        Fmilk, including processed products  Less likely to develop tolerance   ==========================================================================    As in all diagnostic testing, a diagnosis should be made by the physician   based on both test results and patient clinical history

## 2018-01-14 VITALS
HEART RATE: 80 BPM | TEMPERATURE: 97.1 F | HEIGHT: 60 IN | RESPIRATION RATE: 20 BRPM | DIASTOLIC BLOOD PRESSURE: 76 MMHG | BODY MASS INDEX: 28.66 KG/M2 | WEIGHT: 146 LBS | SYSTOLIC BLOOD PRESSURE: 130 MMHG

## 2018-01-14 VITALS
OXYGEN SATURATION: 90 % | HEIGHT: 60 IN | WEIGHT: 148.38 LBS | BODY MASS INDEX: 29.13 KG/M2 | TEMPERATURE: 96.9 F | HEART RATE: 88 BPM | RESPIRATION RATE: 22 BRPM | SYSTOLIC BLOOD PRESSURE: 134 MMHG | DIASTOLIC BLOOD PRESSURE: 82 MMHG

## 2018-01-14 NOTE — MISCELLANEOUS
Message  Pt called office demanding to be seen for allergic reaction x4 days  States face is swelling and Benadryl only works for so long  Per Dr Mike Daly, she is to go to the ER which she refused to do  Demanded to be scheduled with Dr Chrissy Ball Monday, which is a holiday, again told her to go to the ER  She is scheduled with Dr Heidy Funez in [facility] on [address] 01/03/2017  Active Problems    1  Abnormal weight loss (783 21) (R63 4)   2  Acute anaphylaxis, initial encounter (995 0) (T78 2XXA)   3  Acute sinusitis (461 9) (J01 90)   4  Anaphylactic reaction (995 0) (T78 2XXA)   5  Angioneurotic edema (995 1) (T78 3XXA)   6  Arthralgia of left elbow (719 42) (M25 522)   7  Arthritis, infective, 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)   8  Asymptomatic menopausal state (V49 81) (Z78 0)   9  Denied: History of Bladder Cancer (188 9)   10  Bladder Polyps (236 7)   11  Chronic cystitis (595 2) (N30 20)   12  Chronic obstructive pulmonary disease (496) (J44 9)   13  Cough (786 2) (R05)   14  Drug eruption (693 0) (L27 0)   15  Dyspnea (786 09) (R06 00)   16  Dysuria (788 1) (R30 0)   17  Elbow osteomyelitis, 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)   18  Encounter for screening colonoscopy (V76 51) (Z12 11)   19  Encounter for screening mammogram for malignant neoplasm of breast (V76 12)    (Z12 31)   20  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)   21  Gout (274 9) (M10 9)   22  Gross hematuria (599 71) (R31 0)   23  Herpes zoster (053 9) (B02 9)   24  Hives (708 9) (L50 9)   25  Iliotibial band syndrome of left side (728 89) (M76 32)   26  Medial epicondylitis (726 31) (M77 00)   27  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)   28  Need for revaccination (V05 9) (Z23)   29  Screening for depression (V79 0) (Z13 89)   30  Septic olecranon bursitis of left elbow (726 33,041 9) (M70 22,B96 89)   31  Shortness of breath (786 05) (R06 02)   32  Sinus pressure (478 19) (J34 89)   33  Sinusitis (473 9) (J32 9)   34   Strain of left triceps tendon (840 8) ([phone])   35  Urticaria (708 9) (L50 9)    Current Meds   1  EpiPen 2-Ned 0 3 MG/0 3ML Injection Solution Auto-injector; Therapy: 40AIX8534 to Recorded   2  Eye Drops SOLN;   Therapy: (Recorded:10Fmp8102) to Recorded   3  Ipratropium-Albuterol 0 5-2 5 (3) MG/3ML Inhalation Solution; USE 1 UNIT DOSE IN   NEBULIZER 4 TIMES DAILY; Therapy: 39VQG5787 to (Evaluate:03Jun2017)  Requested for: 00FBX0967; Last   Rx:27Mcx8031 Ordered   4  Mometasone Furoate 0 1 % External Ointment; APPLY SPARINGLY TO AFFECTED   AREA(S) TWICE DAILY; Last Rx:60Muw7842 Ordered   5  Ventolin  (90 Base) MCG/ACT Inhalation Aerosol Solution; INHALE 1 TO 2   PUFFS EVERY 4 TO 6 HOURS AS NEEDED; Therapy: 56PDL8667 to (Evaluate:12Jun2017)  Requested for: 46EYN3212; Last   Rx:09Gpo0010 Ordered    Allergies    1  No Known Drug Allergies    2   Seasonal    Signatures   Electronically signed by : Isis April, ; Dec 30 2016  9:39AM EST                       (Author)

## 2018-01-15 VITALS
RESPIRATION RATE: 20 BRPM | HEIGHT: 60 IN | SYSTOLIC BLOOD PRESSURE: 128 MMHG | WEIGHT: 149.25 LBS | OXYGEN SATURATION: 94 % | TEMPERATURE: 97.2 F | DIASTOLIC BLOOD PRESSURE: 84 MMHG | BODY MASS INDEX: 29.3 KG/M2 | HEART RATE: 86 BPM

## 2018-01-15 VITALS
OXYGEN SATURATION: 95 % | DIASTOLIC BLOOD PRESSURE: 86 MMHG | WEIGHT: 149 LBS | HEART RATE: 83 BPM | SYSTOLIC BLOOD PRESSURE: 134 MMHG | HEIGHT: 60 IN | BODY MASS INDEX: 29.25 KG/M2

## 2018-01-16 NOTE — RESULT NOTES
Verified Results  (1) CBC/PLT/DIFF 43Mfn3604 08:07AM Pascual Mae Order Number: [id number]     Test Name Result Flag Reference   WBC COUNT 12 60 Thousand/uL H 4 31-10 16   RBC COUNT 5 71 Million/uL H 3 81-5 12   HEMOGLOBIN 15 9 g/dL H 11 5-15 4   HEMATOCRIT 49 5 % H 34 8-46  1   MCV 87 fL  82-98   MCH 27 8 pg  26 8-34 3   MCHC 32 1 g/dL  31 4-37 4   RDW 14 3 %  11 6-15 1   MPV 9 7 fL  8 9-12 7   PLATELET COUNT 757 Thousands/uL  149-390   nRBC AUTOMATED 0 /100 WBCs     NEUTROPHILS RELATIVE PERCENT 77 % H 43-75   LYMPHOCYTES RELATIVE PERCENT 13 % L 14-44   MONOCYTES RELATIVE PERCENT 5 %  4-12   EOSINOPHILS RELATIVE PERCENT 5 %  0-6   BASOPHILS RELATIVE PERCENT 0 %  0-1   NEUTROPHILS ABSOLUTE COUNT 9 59 Thousands/? ??L H 1 85-7 62   LYMPHOCYTES ABSOLUTE COUNT 1 69 Thousands/? ??L  0 60-4 47   MONOCYTES ABSOLUTE COUNT 0 59 Thousand/? ??L  0 17-1 22   EOSINOPHILS ABSOLUTE COUNT 0 63 Thousand/? ??L H 0 00-0 61   BASOPHILS ABSOLUTE COUNT 0 02 Thousands/? ??L  0 00-0 10

## 2018-01-17 NOTE — RESULT NOTES
Verified Results  (1) CULTURE, BODY FLUID [id number] 11:07AM Garrett Nyhan Order Number: [id number]     Test Name Result Flag Reference   CLINICAL REPORT (Report)     Test:        Body fluid culture  Specimen Type: Body Fluid  Specimen Date:   7/20/2016 11:07 AM  Result Date:    7/23/2016 1:45 PM  Result Status:   Final result  Resulting Lab:   [facility] [address] [place] [address]            Tel: [phone]      CULTURE                                       ------------------                                   4+ Growth of Staphylococcus aureus      STAIN                                        ------------------                                   4+ Polys    2+ Gram positive cocci in clusters      SUSCEPTIBILITY                                   ------------------                                                       Staphylococcus aureus  METHOD                 KENNETH  -------------------------------------  --------------------------------  AMOXICILLIN + CLAVULANATE        <=4/2 ug/ml   Susceptible  AMPICILLIN ($$)             >8 00 ug/ml   Resistant  AMPICILLIN + SULBACTAM ($)       <=8/4 ug/ml   Susceptible  CEFAZOLIN ($)              <=8 00 ug/ml   Susceptible  CLINDAMYCIN ($)             <=0 50 ug/ml   Susceptible [1]  ERYTHROMYCIN ($$$$)           >4 00 ug/ml   Resistant  GENTAMICIN ($$)             <=4 ug/ml    Susceptible  OXACILLIN                0 50 ug/ml    Susceptible  TETRACYCLINE              <=4 ug/ml    Susceptible  TRIMETHOPRIM + SULFAMETHOXAZOLE ($$$)  <=0 5/9 5 ug/ml Susceptible  VANCOMYCIN ($)             2 00 ug/ml    Susceptible         *** [1] The D-zone Test is Positive  This isolate is presumed to be resistant      based on inducible Clindamycin resistance  Clindamycin may still be      effective in some patieints   ***       Plan  Gout    · Hydrocodone-Acetaminophen 5-325 MG Oral Tablet; TAKE 1 TABLET EVERY 4  TO 6 HOURS AS NEEDED FOR PAIN  Septic olecranon bursitis of left elbow    · Amoxicillin-Pot Clavulanate 875-125 MG Oral Tablet; TAKE 1 TABLET EVERY 12  HOURS DAILY

## 2018-01-22 VITALS
HEART RATE: 102 BPM | DIASTOLIC BLOOD PRESSURE: 94 MMHG | HEIGHT: 60 IN | OXYGEN SATURATION: 94 % | BODY MASS INDEX: 29.45 KG/M2 | SYSTOLIC BLOOD PRESSURE: 162 MMHG | TEMPERATURE: 97.3 F | WEIGHT: 150 LBS | RESPIRATION RATE: 22 BRPM

## 2018-01-22 VITALS
HEART RATE: 100 BPM | WEIGHT: 158 LBS | BODY MASS INDEX: 31.02 KG/M2 | HEIGHT: 60 IN | DIASTOLIC BLOOD PRESSURE: 80 MMHG | OXYGEN SATURATION: 89 % | SYSTOLIC BLOOD PRESSURE: 130 MMHG

## 2018-01-23 VITALS
TEMPERATURE: 99.4 F | OXYGEN SATURATION: 87 % | HEART RATE: 116 BPM | SYSTOLIC BLOOD PRESSURE: 132 MMHG | RESPIRATION RATE: 16 BRPM | DIASTOLIC BLOOD PRESSURE: 60 MMHG

## 2018-01-23 NOTE — RESULT NOTES
Verified Results  Complete PFT with [facility] 83YWX4349 11:38AM Alonso Brower     Test Name Result Flag Reference   Complete PFT with [facility]CO 12/13/2017        Summary / No summary entered :      No summary entered   Documents attached :      sPulmonary Function Tests - Alonso Brower; Enc: [id number] - Image      Encounter - Alonso Brower - (Pulmonary Medicine) (Additional      Information Document)

## 2018-03-07 NOTE — CONSULTS
Plan    1  Fluticasone Propionate 50 MCG/ACT Nasal Suspension; USE 2 SPRAYS IN EACH   NOSTRIL ONCE DAILY    2  1 - Trip Montana  (Pulmonary Medicine) Co-Management  *  Status: Hold For -   Scheduling  Requested for: 65HZM6847  Care Summary provided  : Yes    3  Follow-up visit in 2 months Evaluation and Treatment  Follow-up  Status: Hold For -   Scheduling  Requested for: 07WXV6898    Assessment    1  History of nicotine dependence (V15 82) (Z87 891)   2  Former smoker (V15 82) (G69 258)   3  Deviated nasal septum (470) (J34 2)   4  Hypertrophy, nasal, turbinate (478 0) (J34 3)   5  Vocal cord dysfunction (478 5) (J38 3)   6  Allergic rhinitis (477 9) (J30 9)   7  Impacted cerumen of left ear (380 4) (N98 93)    Chief Complaint  Chief Complaint Free Text Note Form: ALLERGIES/REF BY DR Janice Loaiza      History of Present Illness  HPI: Harleen Araujo is a [de-identified] YOF presents for evaluation of allergies  Reports difficulty breathing around her carpet and thinks she is allergic to dust  Reports ongoing nasal congestion  Reports difficulty breathing around strong cleaning solution odors  Denies prior allergy testing ,itchy/watery eyes, sneezing and rhinorrhea  Former smoker of 65 years  Reports poor vision from prior prednisone use  Review of Systems  Complete ENT ROS [facility]:   Eyes: vision change  Ears: hearing loss  Nose: SORES BLOCKED  Mouth: No sores in mouth, no altered taste, no dental problems  Throat: STARTS TO CLOSE  Neck: No neck soreness, no neck pain, no neck lumps or swelling  Genitourinary: No complaints of dysuria, flank pain or frequent urination  Cardiovascular: No complaints of chest pain or palpitations  Respiratory: shortness of breath  Gastrointestinal: No complaints of heartburn, nausea/vomiting, or constipation  Neurological: No complaints of headache, convulsions or memory loss  ROS Reviewed:   ROS reviewed  Active Problems    1  Abnormal weight loss (783 21) (R63 4)   2  Acute anaphylaxis, initial encounter (995 0) (T78 2XXA)   3  Acute bronchitis (466 0) (J20 9)   4  Acute sinusitis (461 9) (J01 90)   5  Anaphylactic reaction (995 0) (T78 2XXA)   6  Angioedema (995 1) (T78 3XXA)   7  Angioneurotic edema (995 1) (T78 3XXA)   8  Arthralgia of left elbow (719 42) (M25 522)   9  Arthritis, infective, 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)   10  Asymptomatic menopausal state (V49 81) (Z78 0)   11  Denied: History of Bladder Cancer (188 9)   12  Bladder Polyps (236 7)   13  Chronic cystitis (595 2) (N30 20)   14  Chronic obstructive pulmonary disease (496) (J44 9)   15  Cough (786 2) (R05)   16  Drug eruption (693 0) (L27 0)   17  Dyspnea (786 09) (R06 00)   18  Dysuria (788 1) (R30 0)   19  Elbow osteomyelitis, 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)   20  Encounter for screening colonoscopy (V76 51) (Z12 11)   21  Encounter for screening mammogram for malignant neoplasm of breast (V76 12)    (Z12 31)   22  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)   23  Gout (274 9) (M10 9)   24  Gross hematuria (599 71) (R31 0)   25  Herpes zoster (053 9) (B02 9)   26  Hives (708 9) (L50 9)   27  Iliotibial band syndrome of left side (728 89) (M76 32)   28  Medial epicondylitis (726 31) (M77 00)   29  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)   30  Need for revaccination (V05 9) (Z23)   31  Screening for depression (V79 0) (Z13 89)   32  Screening for genitourinary condition (V81 6) (Z13 89)   33  Screening for neurological condition (V80 09) (Z13 89)   34  Septic olecranon bursitis of left elbow (726 33,041 9) (M71 122)   35  Shortness of breath (786 05) (R06 02)   36  Sinus pressure (478 19) (J34 89)   37  Sinusitis (473 9) (J32 9)   38  Strain of left triceps tendon (840 8) (S46 312A)   39  Urticaria (708 9) (L50 9)    Past Medical History    1  Denied: History of Bladder Cancer (188 9)   2  Denied: History of mental disorder   3  History of nicotine dependence (V15 82) (Z87 891)   4   Denied: History of substance abuse  Past Medical History Reviewed: The past medical history was reviewed and updated today  Surgical History    1  History of  Section   2  History of Colon Surgery   3  History of Cystoscopy With Resection Of Tumor   4  History of Diagnostic Cystoscopy  Surgical History Reviewed: The surgical history was reviewed and updated today  Family History  Mother    1  Family history of Heart Disease (V17 49)   2  Family history of Stroke Syndrome (V17 1)  Father    3  Family history of Heart Disease (V17 49)  Family History    4  Denied: Family history of mental disorder   5  Denied: Family history of substance abuse  Family History Reviewed: The family history was reviewed and updated today  Social History    · Denied: History of Alcohol   · Current smoker (305 1) (F17 200)   · Former smoker (S63 65) (I62 324)  Social History Reviewed: The social history was reviewed and updated today  The social history was reviewed and is unchanged  Current Meds   1  EpiPen 2-Ned 0 3 MG/0 3ML Injection Solution Auto-injector; Therapy: 92RTU1010 to Recorded   2  Eye Drops SOLN;   Therapy: (Recorded:04Cjd6685) to Recorded   3  Flovent Diskus 100 MCG/BLIST Inhalation Aerosol Powder Breath Activated; USE ONE   INHALATION TWICE A DAY; Therapy: 38VYW7948 to (Evaluate:2017)  Requested for: 14FTV4743; Last   Rx:2017 Ordered   4  Ipratropium-Albuterol 0 5-2 5 (3) MG/3ML Inhalation Solution; USE 1 UNIT DOSE IN   NEBULIZER 4 TIMES DAILY; Therapy: 64CYJ6958 to (Evaluate:2018)  Requested for: 63OKS0773; Last   Rx:2017 Ordered   5  LevoFLOXacin 500 MG Oral Tablet; TAKE 1 TABLET DAILY AS DIRECTED; Therapy: 79IGU9135 to (21 )  Requested for: 87LUB2470; Last   Rx:2017 Ordered   6  Mometasone Furoate 0 1 % External Ointment; APPLY SPARINGLY TO AFFECTED   AREA(S) TWICE DAILY; Last Rx:26Xrf4376 Ordered   7   PredniSONE 10 MG Oral Tablet; 3 Tabs PO QDay x 3 Days, 2 Tabs PO QDay x 3 Days, 1   Tab PO QDay x 3 Days, 1/2 Tab PO QDay x 4 Days; Therapy: 60HYE4453 to (Complete:30Oct2017)  Requested for: 98FDA9388; Last   Rx:17Oct2017 Ordered   8  Spiriva HandiHaler 18 MCG Inhalation Capsule; INHALE CONTENTS OF 1 CAPSULE   ONCE DAILY; Therapy: 78ZIS0084 to (Evaluate:63Rhj8261)  Requested for: 05Lqr4987; Last   Rx:21Veq9941 Ordered   9  Ventolin  (90 Base) MCG/ACT Inhalation Aerosol Solution; INHALE 1 TO 2 PUFFS   EVERY 4 TO 6 HOURS AS NEEDED; Therapy: 41VKE4891 to (Evaluate:83Qzg4426)  Requested for: 17Vwb5382; Last   Rx:08Vea6255 Ordered   10  ZyrTEC Allergy 10 MG Oral Tablet; 2 in am and 2 at night; Therapy: 72ISE5190 to Recorded    Allergies    1  No Known Drug Allergies    2  Seasonal    Vitals  Signs   Recorded: 65SPQ7956 10:55AM   Heart Rate: 83  Systolic: 844, LUE, Sitting  Diastolic: 86, LUE, Sitting  Height: 5 ft   Weight: 149 lb   BMI Calculated: 29 1  BSA Calculated: 1 65  O2 Saturation: 95    Physical Exam    Constitutional:   General appearance: Well developed, well nourished  Ability to communicate: Voice normal  Speech normal    Head and Face:   Head and face: Head normocephalic, atraumatic with no lesions or palpable masses  Palpation of the face for sinus tenderness: No sinus tenderness  Submandibular glands and parotid glands: non tender, no masses  Ears:   Otoscopic examination: Abnormal  left eac cerumen impaction s/p debridement tms are clear and intact  Hearing: Normal    Nose:   External auditory canals: No cerumen impaction noted, no drainage observed, no edema noted in EAC, no exostoses present, no osteoma present, no tenderness noted  External Inspection of Nose: No deformities observed, no deviation of bone structure, no skin lesion present, no swelling present  Nares are symmetric, no deviation of caudal portion of septum  Nasal mucosa, septum, and turbinates: Abnormal  turbinate hypertrophy, left deviated nasal septum     Mouth:   Lips, teeth, and gums: Abnormal    Throat:   Examination of Oropharynx: Oral Mucosa: no masses, lesions, leukoplakia, or scarring  Normal Modesta's ducts, pink and moist, no discoloration noted  Floor of mouth: normal Warthin's ducts, no lesions, ulcerations, leukoplakia or torus mandibularis  Tonsils: no hypertrophy or ulcerations noted  Tongue: normal mobility, surfaces without fissures, leukoplakia, ulceration or masses, not enlarged, no pallor noted, no white patches present  Inspect Pharyngeal Walls/Pyriform Sinus: No edema of posterior pharyngeal walls observed  Neck:   Examination of Neck: No decreased range of motion, trachea midline  Examination of Thyroid: Normal size, non-tender, no palpable masses  Pulmonary:   Respiratory effort: Normal respiratory rate and rhythm, no increased work of breathing  Cardiovascular:   Inspection of Peripheral vascular system by observation: Normal    Lymphatic:   Palpation of Lymph Nodes: Neck: No generalized lymphadenopathy  Neurological/Psychiatric:   Cranial nerves II-VII grossly intact  Oriented to person, place, and time  Cooperative, in no acute distress  Procedure  Procedure: Flexible fiberoptic laryngoscopy    Indications: Evaluation of the upper aerodigestive tract    Procedure in detail: After informed verbal consent was obtained the nose was anesthetized with topical lidocaine and phenylephrine  After adequate time for anesthesia the upper aerodigestive tract was evaluated using the endoscope including the nasopharynx, oropharynx, hypopharynx, and larynx with the below listed findings  Scope was placed in the right and left nasal cavity  The nasal cavity was evaluated as well  The scope was removed  The patient tolerated the procedure well and discharged in stable condition  Findings:  No significant mucopurulence or polyposis in bilateral nasal cavities  No lesions or masses of the nasopharynx, oropharynx, hypopharynx, or subglottis  Paradoxical movement of vocal cords  No erythema of true and false vocal cords  No significant lesions or masses of the base of tongue, epiglottis, piriform sinuses, pharyngeal walls, or glossopharyngeal folds  Procedure: Cerumen debridement left EAC    Indications: Cerumen impaction     Procedure in detail: After informed verbal consent was obtained the ear was visualized using microscopy  Using cerumen loop, suction, and alligator forceps the cerumen was debrided and the findings below were seen  The patient tolerated the procedure well  FINDINGS:  Tympanic membrane is intact with intact normal landmarks  Discussion/Summary  Discussion Summary:   We discussed ongoing management of vocal cord dysfunction and allergic rhinitis  She feels that her problems are due to allergy despite significant asthma similarities and no previous testing for any cause of lung dysfunction  She is fairly reticent to have any treatment  We taught VCD breathing techniques including pursed lip breathing as this can be very helpful for any dyspnea as it tends to slow respiration down and improve ventilation  We recommended Flonase daily as well as referred her to Pulmonary for evaluation of likely COPD and possible lung cause of dyspnea  We discussed ongoing management of cerumen  F/U in 2 months or sooner with any concerns        Signatures   Electronically signed by : NEISHA Klein ; Oct 20 2017  4:17PM EST                       (Author)

## 2018-03-07 NOTE — PROGRESS NOTES
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)   8  Asymptomatic menopausal state (V49 81) (Z78 0)   9  Denied: History of Bladder Cancer (188 9)   10  Bladder Polyps (236 7)   11  Chronic cystitis (595 2) (N30 20)   12  Chronic obstructive pulmonary disease (496) (J44 9)   13  Cough (786 2) (R05)   14  Drug eruption (693 0) (L27 0)   15  Dyspnea (786 09) (R06 00)   16  Dysuria (788 1) (R30 0)   17  Elbow osteomyelitis, 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)   18  Encounter for screening colonoscopy (V76 51) (Z12 11)   19  Encounter for screening mammogram for malignant neoplasm of breast (V76 12)    (Z12 31)   20  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)   21  Gout (274 9) (M10 9)   22  Gross hematuria (599 71) (R31 0)   23  Herpes zoster (053 9) (B02 9)   24  Hives (708 9) (L50 9)   25  Iliotibial band syndrome of left side (728 89) (M76 32)   26  Medial epicondylitis (726 31) (M77 00)   27  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)   28  Need for revaccination (V05 9) (Z23)   29  Screening for depression (V79 0) (Z13 89)   30  Septic olecranon bursitis of left elbow (726 33,041 9) (M70 22,B96 89)   31  Shortness of breath (786 05) (R06 02)   32  Sinus pressure (478 19) (J34 89)   33  Sinusitis (473 9) (J32 9)   34  Strain of left triceps tendon (840 8) (S46 312A)   35  Urticaria (708 9) (L50 9)    Immunizations  Influenza --- Michael Dominguez: 14-Oct-2008;  Series2: 01-Oct-2010   PCV --- Michael Dominguez: 18-Dec-2015   Tdap --- Series1: 2013 Current Meds   1  EpiPen 2-Ned 0 3 MG/0 3ML Injection Solution Auto-injector   2  Eye Drops SOLN   3  Ipratropium-Albuterol 0 5-2 5 (3) MG/3ML Inhalation Solution; USE 1 UNIT DOSE IN   NEBULIZER 4 TIMES DAILY   4  Mometasone Furoate 0 1 % External Ointment; APPLY SPARINGLY TO AFFECTED   AREA(S) TWICE DAILY   5  Ventolin  (90 Base) MCG/ACT Inhalation Aerosol Solution; INHALE 1 TO 2 PUFFS   EVERY 4 TO 6 HOURS AS NEEDED    Allergies    1  No Known Drug Allergies    2   Seasonal    Future Appointments    Date/Time Provider Specialty Site   05/03/2017 09:15 AM Ha Ha DO Internal Medicine [address] [facility]     Signatures   Electronically signed by : Sharla Hameed OM; Dec 19 2016 10:13AM EST                       (Co-author)

## 2018-05-08 LAB
ALT SERPL W P-5'-P-CCNC: 30 IU/L (ref 9–28)
AST SERPL W P-5'-P-CCNC: 22 U/L (ref 7–26)
BANDS (HISTORICAL): 1
BASOPHILS # BLD AUTO: 0 X3/UL (ref 0–0.3)
BASOPHILS # BLD AUTO: 0.1 % (ref 0–2)
DEPRECATED RDW RBC AUTO: 14 %
EOSINOPHIL # BLD AUTO: 0 X3/UL (ref 0–0.5)
EOSINOPHIL NFR BLD AUTO: 0.3 % (ref 0–5)
HCT VFR BLD AUTO: 40.9 % (ref 37–47)
HGB BLD-MCNC: 13.3 G/DL (ref 12–16)
LYMPHOCYTES # BLD AUTO: 0.6 X3/UL (ref 1.2–4.2)
LYMPHOCYTES NFR BLD AUTO: 12 % (ref 20.5–51.1)
LYMPHOCYTES NFR BLD AUTO: 8.6 % (ref 20.5–51.1)
MCH RBC QN AUTO: 28.6 PG (ref 26–34)
MCHC RBC AUTO-ENTMCNC: 32.5 G/DL (ref 31–37)
MCV RBC AUTO: 88.1 FL (ref 81–99)
MONOCYTES # BLD AUTO: 0.4 X3/UL (ref 0–1)
MONOCYTES (HISTORICAL): 2 % (ref 1.7–12)
MONOCYTES NFR BLD AUTO: 4.9 % (ref 1.7–12)
NEUTROPHILS # BLD AUTO: 6.4 X3/UL (ref 1.4–6.5)
NEUTROPHILS ABS COUNT (HISTORICAL): 85 % (ref 42.2–75.2)
NEUTS SEG NFR BLD AUTO: 86.1 % (ref 42.2–75.2)
PLATELET # BLD AUTO: 186 X3/UL (ref 130–400)
PLATELET ESTIMATE (HISTORICAL): NORMAL
PMV BLD AUTO: 7.6 FL
RBC # BLD AUTO: 4.64 X6/UL (ref 3.9–5.2)
RBC MORPHOLOGY (HISTORICAL): NORMAL
WBC # BLD AUTO: 7.4 X3/UL (ref 4.8–10.8)

## 2018-07-10 ENCOUNTER — HOSPITAL ENCOUNTER (OUTPATIENT)
Dept: NON INVASIVE DIAGNOSTICS | Facility: HOSPITAL | Age: 83
Discharge: HOME | End: 2018-07-10
Attending: INTERNAL MEDICINE
Payer: MEDICARE

## 2018-07-10 ENCOUNTER — TRANSCRIBE ORDERS (OUTPATIENT)
Dept: ADMINISTRATIVE | Facility: HOSPITAL | Age: 83
End: 2018-07-10

## 2018-07-10 DIAGNOSIS — R60.0 EDEMA LEG: Primary | ICD-10-CM

## 2018-07-10 DIAGNOSIS — R60.0 EDEMA LEG: ICD-10-CM

## 2018-07-10 PROCEDURE — 93971 EXTREMITY STUDY: CPT | Performed by: SURGERY

## 2018-07-10 PROCEDURE — 93971 EXTREMITY STUDY: CPT

## 2018-11-27 ENCOUNTER — APPOINTMENT (EMERGENCY)
Dept: CT IMAGING | Facility: HOSPITAL | Age: 83
End: 2018-11-27
Payer: MEDICARE

## 2018-11-27 ENCOUNTER — HOSPITAL ENCOUNTER (EMERGENCY)
Facility: HOSPITAL | Age: 83
Discharge: HOME | End: 2018-11-27
Attending: EMERGENCY MEDICINE | Admitting: EMERGENCY MEDICINE
Payer: MEDICARE

## 2018-11-27 VITALS
RESPIRATION RATE: 16 BRPM | HEART RATE: 78 BPM | WEIGHT: 149 LBS | BODY MASS INDEX: 27.25 KG/M2 | SYSTOLIC BLOOD PRESSURE: 155 MMHG | TEMPERATURE: 98.4 F | DIASTOLIC BLOOD PRESSURE: 78 MMHG | OXYGEN SATURATION: 98 %

## 2018-11-27 VITALS
DIASTOLIC BLOOD PRESSURE: 79 MMHG | TEMPERATURE: 98.3 F | WEIGHT: 166 LBS | BODY MASS INDEX: 30.55 KG/M2 | SYSTOLIC BLOOD PRESSURE: 137 MMHG | OXYGEN SATURATION: 93 % | HEIGHT: 62 IN | HEART RATE: 81 BPM

## 2018-11-27 DIAGNOSIS — M54.9 BACK PAIN: Primary | ICD-10-CM

## 2018-11-27 LAB
ALBUMIN SERPL BCP-MCNC: 4.5 G/DL (ref 3.5–5.7)
ALP SERPL-CCNC: 52 U/L (ref 55–165)
ALT SERPL W P-5'-P-CCNC: 38 U/L (ref 7–52)
ANION GAP SERPL CALCULATED.3IONS-SCNC: 6 MMOL/L (ref 4–13)
AST SERPL W P-5'-P-CCNC: 25 U/L (ref 13–39)
BASOPHILS # BLD AUTO: 0.1 THOUSANDS/ΜL (ref 0–0.1)
BASOPHILS NFR BLD AUTO: 1 % (ref 0–1)
BILIRUB SERPL-MCNC: 0.4 MG/DL (ref 0.2–1)
BILIRUB UR QL STRIP: NEGATIVE
BUN SERPL-MCNC: 14 MG/DL (ref 7–25)
CALCIUM SERPL-MCNC: 9.5 MG/DL (ref 8.6–10.5)
CHLORIDE SERPL-SCNC: 95 MMOL/L (ref 98–107)
CLARITY UR: CLEAR
CO2 SERPL-SCNC: 31 MMOL/L (ref 21–31)
COLOR UR: YELLOW
CREAT SERPL-MCNC: 0.59 MG/DL (ref 0.6–1.2)
EOSINOPHIL # BLD AUTO: 0.1 THOUSAND/ΜL (ref 0–0.61)
EOSINOPHIL NFR BLD AUTO: 2 % (ref 0–6)
ERYTHROCYTE [DISTWIDTH] IN BLOOD BY AUTOMATED COUNT: 14.2 % (ref 11.6–15.1)
GFR SERPL CREATININE-BSD FRML MDRD: 86 ML/MIN/1.73SQ M
GLUCOSE SERPL-MCNC: 118 MG/DL (ref 65–140)
GLUCOSE UR STRIP-MCNC: NEGATIVE MG/DL
HCT VFR BLD AUTO: 41.6 % (ref 37–47)
HGB BLD-MCNC: 13.8 G/DL (ref 11.5–15.4)
HGB UR QL STRIP.AUTO: NEGATIVE
HOLD SPECIMEN: NORMAL
KETONES UR STRIP-MCNC: NEGATIVE MG/DL
LEUKOCYTE ESTERASE UR QL STRIP: NEGATIVE
LYMPHOCYTES # BLD AUTO: 1.2 THOUSANDS/ΜL (ref 0.6–4.47)
LYMPHOCYTES NFR BLD AUTO: 16 % (ref 14–44)
MCH RBC QN AUTO: 27.6 PG (ref 26.8–34.3)
MCHC RBC AUTO-ENTMCNC: 33.1 G/DL (ref 31.4–37.4)
MCV RBC AUTO: 83 FL (ref 82–98)
MONOCYTES # BLD AUTO: 0.7 THOUSAND/ΜL (ref 0.17–1.22)
MONOCYTES NFR BLD AUTO: 9 % (ref 4–12)
NEUTROPHILS # BLD AUTO: 5.4 THOUSANDS/ΜL (ref 1.85–7.62)
NEUTS SEG NFR BLD AUTO: 72 % (ref 43–75)
NITRITE UR QL STRIP: NEGATIVE
NRBC BLD AUTO-RTO: 0 /100 WBCS
PH UR STRIP.AUTO: 7.5 [PH] (ref 5–8)
PLATELET # BLD AUTO: 179 THOUSANDS/UL (ref 149–390)
PMV BLD AUTO: 7.8 FL (ref 8.9–12.7)
POTASSIUM SERPL-SCNC: 3.9 MMOL/L (ref 3.5–5.5)
PROT SERPL-MCNC: 7.1 G/DL (ref 6.4–8.9)
PROT UR STRIP-MCNC: NEGATIVE MG/DL
RBC # BLD AUTO: 5 MILLION/UL (ref 3.81–5.12)
SODIUM SERPL-SCNC: 132 MMOL/L (ref 134–143)
SP GR UR STRIP.AUTO: 1.01 (ref 1–1.03)
UROBILINOGEN UR QL STRIP.AUTO: 0.2 E.U./DL
WBC # BLD AUTO: 7.5 THOUSAND/UL (ref 4.31–10.16)

## 2018-11-27 PROCEDURE — 96361 HYDRATE IV INFUSION ADD-ON: CPT

## 2018-11-27 PROCEDURE — 96375 TX/PRO/DX INJ NEW DRUG ADDON: CPT

## 2018-11-27 PROCEDURE — 81003 URINALYSIS AUTO W/O SCOPE: CPT | Performed by: EMERGENCY MEDICINE

## 2018-11-27 PROCEDURE — 96374 THER/PROPH/DIAG INJ IV PUSH: CPT

## 2018-11-27 PROCEDURE — 85025 COMPLETE CBC W/AUTO DIFF WBC: CPT | Performed by: EMERGENCY MEDICINE

## 2018-11-27 PROCEDURE — 36415 COLL VENOUS BLD VENIPUNCTURE: CPT | Performed by: EMERGENCY MEDICINE

## 2018-11-27 PROCEDURE — 74175 CTA ABDOMEN W/CONTRAST: CPT

## 2018-11-27 PROCEDURE — 96376 TX/PRO/DX INJ SAME DRUG ADON: CPT

## 2018-11-27 PROCEDURE — 96372 THER/PROPH/DIAG INJ SC/IM: CPT

## 2018-11-27 PROCEDURE — 99283 EMERGENCY DEPT VISIT LOW MDM: CPT

## 2018-11-27 PROCEDURE — 71275 CT ANGIOGRAPHY CHEST: CPT

## 2018-11-27 PROCEDURE — 99284 EMERGENCY DEPT VISIT MOD MDM: CPT

## 2018-11-27 PROCEDURE — 74176 CT ABD & PELVIS W/O CONTRAST: CPT

## 2018-11-27 PROCEDURE — 80053 COMPREHEN METABOLIC PANEL: CPT | Performed by: EMERGENCY MEDICINE

## 2018-11-27 RX ORDER — HYDROMORPHONE HCL/PF 1 MG/ML
0.5 SYRINGE (ML) INJECTION ONCE
Status: COMPLETED | OUTPATIENT
Start: 2018-11-27 | End: 2018-11-27

## 2018-11-27 RX ORDER — KETOROLAC TROMETHAMINE 30 MG/ML
15 INJECTION, SOLUTION INTRAMUSCULAR; INTRAVENOUS ONCE
Status: COMPLETED | OUTPATIENT
Start: 2018-11-27 | End: 2018-11-27

## 2018-11-27 RX ORDER — SODIUM CHLORIDE 9 MG/ML
125 INJECTION, SOLUTION INTRAVENOUS CONTINUOUS
Status: DISCONTINUED | OUTPATIENT
Start: 2018-11-27 | End: 2018-11-27 | Stop reason: HOSPADM

## 2018-11-27 RX ORDER — CYCLOBENZAPRINE HCL 10 MG
10 TABLET ORAL ONCE
Status: COMPLETED | OUTPATIENT
Start: 2018-11-27 | End: 2018-11-27

## 2018-11-27 RX ORDER — CYCLOBENZAPRINE HCL 10 MG
10 TABLET ORAL 3 TIMES DAILY
Qty: 15 TABLET | Refills: 0 | Status: SHIPPED | OUTPATIENT
Start: 2018-11-27 | End: 2018-12-28

## 2018-11-27 RX ORDER — ACETAMINOPHEN AND CODEINE PHOSPHATE 300; 30 MG/1; MG/1
1-2 TABLET ORAL EVERY 6 HOURS PRN
Qty: 10 TABLET | Refills: 0 | Status: SHIPPED | OUTPATIENT
Start: 2018-11-27 | End: 2018-12-07

## 2018-11-27 RX ORDER — KETOROLAC TROMETHAMINE 30 MG/ML
60 INJECTION, SOLUTION INTRAMUSCULAR; INTRAVENOUS ONCE
Status: COMPLETED | OUTPATIENT
Start: 2018-11-27 | End: 2018-11-27

## 2018-11-27 RX ORDER — KETOROLAC TROMETHAMINE 10 MG/1
10 TABLET, FILM COATED ORAL 3 TIMES DAILY
Qty: 15 TABLET | Refills: 0 | Status: SHIPPED | OUTPATIENT
Start: 2018-11-27 | End: 2018-12-28

## 2018-11-27 RX ORDER — DOCUSATE SODIUM 250 MG
250 CAPSULE ORAL DAILY
Qty: 10 CAPSULE | Refills: 0 | Status: SHIPPED | OUTPATIENT
Start: 2018-11-27 | End: 2018-12-28

## 2018-11-27 RX ADMIN — KETOROLAC TROMETHAMINE 60 MG: 30 INJECTION, SOLUTION INTRAMUSCULAR at 01:46

## 2018-11-27 RX ADMIN — HYDROMORPHONE HYDROCHLORIDE 0.5 MG: 1 INJECTION, SOLUTION INTRAMUSCULAR; INTRAVENOUS; SUBCUTANEOUS at 12:20

## 2018-11-27 RX ADMIN — SODIUM CHLORIDE 125 ML/HR: 0.9 INJECTION, SOLUTION INTRAVENOUS at 08:56

## 2018-11-27 RX ADMIN — HYDROMORPHONE HYDROCHLORIDE 0.5 MG: 1 INJECTION, SOLUTION INTRAMUSCULAR; INTRAVENOUS; SUBCUTANEOUS at 10:24

## 2018-11-27 RX ADMIN — CYCLOBENZAPRINE HYDROCHLORIDE 10 MG: 10 TABLET, FILM COATED ORAL at 01:46

## 2018-11-27 RX ADMIN — IOHEXOL 85 ML: 350 INJECTION, SOLUTION INTRAVENOUS at 11:02

## 2018-11-27 RX ADMIN — KETOROLAC TROMETHAMINE 15 MG: 30 INJECTION, SOLUTION INTRAMUSCULAR at 08:56

## 2018-11-27 NOTE — ED NOTES
Patient states her pain is coming back  ED physician made aware        Brisa Garza RN  11/27/18 [id number]

## 2018-11-27 NOTE — DISCHARGE INSTRUCTIONS
Acute Low Back Pain   WHAT YOU NEED TO KNOW:   Acute low back pain is sudden discomfort in your lower back area that lasts for up to 6 weeks  The discomfort makes it difficult to tolerate activity  DISCHARGE INSTRUCTIONS:   Return to the emergency department if:   · You have severe pain  · You have sudden stiffness and heaviness on both buttocks down to both legs  · You have numbness or weakness in one leg, or pain in both legs  · You have numbness in your genital area or across your lower back  · You cannot control your urine or bowel movements  Contact your healthcare provider if:   · You have a fever  · You have pain at night or when you rest     · Your pain does not get better with treatment  · You have pain that worsens when you cough or sneeze  · You suddenly feel something pop or snap in your back  · You have questions or concerns about your condition or care  Medicines: The following medicines may be ordered by your healthcare provider:  · Acetaminophen  decreases pain  It is available without a doctor's order  Ask how much to take and how often to take it  Follow directions  Acetaminophen can cause liver damage if not taken correctly  · NSAIDs  help decrease swelling and pain  This medicine is available with or without a doctor's order  NSAIDs can cause stomach bleeding or kidney problems in certain people  If you take blood thinner medicine, always ask your healthcare provider if NSAIDs are safe for you  Always read the medicine label and follow directions  · Prescription pain medicine  may be given  Ask your healthcare provider how to take this medicine safely  · Muscle relaxers  decrease pain by relaxing the muscles in your lower spine  · Take your medicine as directed  Contact your healthcare provider if you think your medicine is not helping or if you have side effects  Tell him of her if you are allergic to any medicine   Keep a list of the medicines, vitamins, and herbs you take  Include the amounts, and when and why you take them  Bring the list or the pill bottles to follow-up visits  Carry your medicine list with you in case of an emergency  Self-care:   · Stay active  as much as you can without causing more pain  Bed rest could make your back pain worse  Start with some light exercises such as walking  Avoid heavy lifting until your pain is gone  Ask for more information about the activities or exercises that are right for you  · Ice  helps decrease swelling, pain, and muscle spams  Put crushed ice in a plastic bag  Cover it with a towel  Place it on your lower back for 20 to 30 minutes every 2 hours  Do this for about 2 to 3 days after your pain starts, or as directed  · Heat  helps decrease pain and muscle spasms  Start to use heat after treatment with ice has stopped  Use a small towel dampened with warm water or a heating pad, or sit in a warm bath  Apply heat on the area for 20 to 30 minutes every 2 hours for as many days as directed  Alternate heat and ice  Prevent acute low back pain:   · Use proper body mechanics  ¨ Bend at the hips and knees when you  objects  Do not bend from the waist  Use your leg muscles as you lift the load  Do not use your back  Keep the object close to your chest as you lift it  Try not to twist or lift anything above your waist     ¨ Change your position often when you stand for long periods of time  Rest one foot on a small box or footrest, and then switch to the other foot often  ¨ Try not to sit for long periods of time  When you do, sit in a straight-backed chair with your feet flat on the floor  Never reach, pull, or push while you are sitting  · Do exercises that strengthen your back muscles  Warm up before you exercise  Ask your healthcare provider the best exercises for you  · Maintain a healthy weight  Ask your healthcare provider how much you should weigh   Ask him to help you create a weight loss plan if you are overweight  Follow up with your healthcare provider as directed:  Return for a follow-up visit if you still have pain after 1 to 3 weeks of treatment  You may need to visit an orthopedist if your back pain lasts more than 12 weeks  Write down your questions so you remember to ask them during your visits  © 2017 [employer]  Information is for End User's use only and may not be sold, redistributed or otherwise used for commercial purposes  All illustrations and images included in CareNotes® are the copyrighted property of [employer]  or [employer]  The above information is an  only  It is not intended as medical advice for individual conditions or treatments  Talk to your doctor, nurse or pharmacist before following any medical regimen to see if it is safe and effective for you

## 2018-11-27 NOTE — DISCHARGE INSTRUCTIONS
Acute Low Back Pain   WHAT YOU NEED TO KNOW:   Acute low back pain is sudden discomfort in your lower back area that lasts for up to 6 weeks  The discomfort makes it difficult to tolerate activity  DISCHARGE INSTRUCTIONS:   Return to the emergency department if:   · You have severe pain  · You have sudden stiffness and heaviness on both buttocks down to both legs  · You have numbness or weakness in one leg, or pain in both legs  · You have numbness in your genital area or across your lower back  · You cannot control your urine or bowel movements  Contact your healthcare provider if:   · You have a fever  · You have pain at night or when you rest     · Your pain does not get better with treatment  · You have pain that worsens when you cough or sneeze  · You suddenly feel something pop or snap in your back  · You have questions or concerns about your condition or care  Medicines: The following medicines may be ordered by your healthcare provider:  · Acetaminophen  decreases pain  It is available without a doctor's order  Ask how much to take and how often to take it  Follow directions  Acetaminophen can cause liver damage if not taken correctly  · NSAIDs  help decrease swelling and pain  This medicine is available with or without a doctor's order  NSAIDs can cause stomach bleeding or kidney problems in certain people  If you take blood thinner medicine, always ask your healthcare provider if NSAIDs are safe for you  Always read the medicine label and follow directions  · Prescription pain medicine  may be given  Ask your healthcare provider how to take this medicine safely  · Muscle relaxers  decrease pain by relaxing the muscles in your lower spine  · Take your medicine as directed  Contact your healthcare provider if you think your medicine is not helping or if you have side effects  Tell him of her if you are allergic to any medicine   Keep a list of the medicines, vitamins, and herbs you take  Include the amounts, and when and why you take them  Bring the list or the pill bottles to follow-up visits  Carry your medicine list with you in case of an emergency  Self-care:   · Stay active  as much as you can without causing more pain  Bed rest could make your back pain worse  Start with some light exercises such as walking  Avoid heavy lifting until your pain is gone  Ask for more information about the activities or exercises that are right for you  · Ice  helps decrease swelling, pain, and muscle spams  Put crushed ice in a plastic bag  Cover it with a towel  Place it on your lower back for 20 to 30 minutes every 2 hours  Do this for about 2 to 3 days after your pain starts, or as directed  · Heat  helps decrease pain and muscle spasms  Start to use heat after treatment with ice has stopped  Use a small towel dampened with warm water or a heating pad, or sit in a warm bath  Apply heat on the area for 20 to 30 minutes every 2 hours for as many days as directed  Alternate heat and ice  Prevent acute low back pain:   · Use proper body mechanics  ¨ Bend at the hips and knees when you  objects  Do not bend from the waist  Use your leg muscles as you lift the load  Do not use your back  Keep the object close to your chest as you lift it  Try not to twist or lift anything above your waist     ¨ Change your position often when you stand for long periods of time  Rest one foot on a small box or footrest, and then switch to the other foot often  ¨ Try not to sit for long periods of time  When you do, sit in a straight-backed chair with your feet flat on the floor  Never reach, pull, or push while you are sitting  · Do exercises that strengthen your back muscles  Warm up before you exercise  Ask your healthcare provider the best exercises for you  · Maintain a healthy weight  Ask your healthcare provider how much you should weigh   Ask him to help you create a weight loss plan if you are overweight  Follow up with your healthcare provider as directed:  Return for a follow-up visit if you still have pain after 1 to 3 weeks of treatment  You may need to visit an orthopedist if your back pain lasts more than 12 weeks  Write down your questions so you remember to ask them during your visits  © 2017 [employer] Information is for End User's use only and may not be sold, redistributed or otherwise used for commercial purposes  All illustrations and images included in CareNotes® are the copyrighted property of [employer]  or [employer]  The above information is an  only  It is not intended as medical advice for individual conditions or treatments  Talk to your doctor, nurse or pharmacist before following any medical regimen to see if it is safe and effective for you   ~~~    Rest back  Use medications  If continues, then call and see your regular Family Doctor for a recheck and opinion  If get fever, belly pains, urinary problems, or worse pains, then return immediately to the ER for a recheck, the CAT Scan we offered, and further testing      ```

## 2018-11-27 NOTE — ED PROVIDER NOTES
History  Chief Complaint   Patient presents with    Back Pain     82year old female presents complaining of left mid-lower back pains for 2 days, getting worse tonight  No fall, impact or direct back injury  However, she was moving things recently and "May have pulled back"  Denies urinary symptoms  Denies abdominal pain  No fevers  None       Past Medical History:   Diagnosis Date    Asthma     Cardiac disease     COPD (chronic obstructive pulmonary disease) ([facility] [address] )        Past Surgical History:   Procedure Laterality Date    COLON SURGERY         History reviewed  No pertinent family history  I have reviewed and agree with the history as documented  Social History   Substance Use Topics    Smoking status: Former Smoker     Quit date: 11/27/2013    Smokeless tobacco: Never Used    Alcohol use Not on file        Review of Systems   Constitutional: Negative for chills and fever  HENT: Negative for sore throat  Eyes: Negative for pain  Respiratory: Negative for cough and shortness of breath  Cardiovascular: Negative for chest pain  Gastrointestinal: Negative for abdominal pain, diarrhea and vomiting  Genitourinary: Negative for dysuria, hematuria and urgency  Musculoskeletal: Positive for back pain  Negative for myalgias, neck pain and neck stiffness  Neurological: Negative for seizures, syncope and headaches  Psychiatric/Behavioral: Negative for behavioral problems and confusion  Physical Exam  Physical Exam   Constitutional: She is oriented to person, place, and time  She appears well-developed and well-nourished  Eyes: Conjunctivae are normal    Neck: Normal range of motion  Neck supple  Pulmonary/Chest: Effort normal    Abdominal: She exhibits no distension  Musculoskeletal: Normal range of motion  She exhibits no deformity  Tenderness: palpation left mid back  Neurological: She is alert and oriented to person, place, and time  Skin: Skin is warm   No rash noted    Psychiatric: She has a normal mood and affect  Nursing note and vitals reviewed  Vital Signs  ED Triage Vitals [11/27/18 0122]   Temperature Pulse Resp Blood Pressure SpO2   98 3 °F (36 8 °C) 96 -- 152/88 91 %      Temp Source Heart Rate Source Patient Position - Orthostatic VS BP Location FiO2 (%)   Tympanic -- -- -- --      Pain Score       --           Vitals:    11/27/18 0122   BP: 152/88   Pulse: 96       Visual Acuity      ED Medications  Medications   ketorolac (TORADOL) injection 60 mg (60 mg Intramuscular Given 11/27/18 0146)   cyclobenzaprine (FLEXERIL) tablet 10 mg (10 mg Oral Given 11/27/18 0146)       Diagnostic Studies  Results Reviewed     None                 No orders to display              Procedures  Procedures       Phone Contacts  ED Phone Contact    ED Course  ED Course as of Nov 27 0159 Tue Nov 27, 2018 0148 Patient believes that recent moving chores hurt her back  She relates pain is related to motion  No fall or direct impact to suggest a fracture  She says "hurry up and give me something now " I suggest that she get a CAT Scan now to make certain that this is not related to kidney stone or other more serious causes of back pain  But patient rejects that offer  "I want to get a shot and go home now " We give two medications quickly that should offer pain relief  I will prescribe two medications for home  In presence of ER Nurse and relative, I once again offer her CAT Scan now  She refuses  She wants to go home immediately  Says "I will call my doctor in morning"  Thus, I cannot force her to get a CT Scan against her will                                  MDM  CritCare Time    Disposition  Final diagnoses:   Back pain     Time reflects when diagnosis was documented in both MDM as applicable and the Disposition within this note     Time User Action Codes Description Comment    11/27/2018  1:53 AM Gerardo Serna Add [M54 9] Back pain       ED Disposition     ED Disposition Condition Comment    Discharge  Andrzej Alcocer discharge to home/self care  Condition at discharge: Stable        Follow-up Information    None         Patient's Medications   Discharge Prescriptions    CYCLOBENZAPRINE (FLEXERIL) 10 MG TABLET    Take 1 tablet (10 mg total) by mouth 3 (three) times a day for 5 days       Start Date: 11/27/2018End Date: 12/2/2018       Order Dose: 10 mg       Quantity: 15 tablet    Refills: 0    KETOROLAC (TORADOL) 10 MG TABLET    Take 1 tablet (10 mg total) by mouth 3 (three) times a day for 5 days       Start Date: 11/27/2018End Date: 12/2/2018       Order Dose: 10 mg       Quantity: 15 tablet    Refills: 0     No discharge procedures on file      ED Provider  Electronically Signed by           Mimi Eisenmenger, MD  11/27/18 5614

## 2018-11-27 NOTE — ED PROVIDER NOTES
History  Chief Complaint   Patient presents with    Back Pain     Patient is having pain in the mid to lower left sided back pain  Increased urination  Took two advil      Patient is a 17-year-old female with a history of COPD and heart disease who has said that she had left flank pain for the past 3 days  She was is number department last night and did not have any a workup because when she got here she said she had to leave because the persisted over had to go home a  bus  Patient returns now because the pain is becoming increasingly worse  She does report some dysuria but denies any urinary frequency  She does not have any abdominal pain  She has had neither fevers nor chills  She says the pain is not worse when she moves  Her weight is been stable  Patient says she is not more dyspneic than usual             Prior to Admission Medications   Prescriptions Last Dose Informant Patient Reported? Taking? cyclobenzaprine (FLEXERIL) 10 mg tablet   No No   Sig: Take 1 tablet (10 mg total) by mouth 3 (three) times a day for 5 days   ketorolac (TORADOL) 10 mg tablet   No No   Sig: Take 1 tablet (10 mg total) by mouth 3 (three) times a day for 5 days      Facility-Administered Medications: None       Past Medical History:   Diagnosis Date    Asthma     Cardiac disease     COPD (chronic obstructive pulmonary disease) ([facility] [address] )        Past Surgical History:   Procedure Laterality Date    COLON SURGERY         History reviewed  No pertinent family history  I have reviewed and agree with the history as documented  Social History   Substance Use Topics    Smoking status: Former Smoker     Quit date: 11/27/2013    Smokeless tobacco: Never Used    Alcohol use No        Review of Systems   Constitutional: Negative for chills, fatigue, fever and unexpected weight change  HENT: Negative for congestion and nosebleeds  Eyes: Negative for visual disturbance     Respiratory: Negative for chest tightness and shortness of breath  Cardiovascular: Negative for chest pain, palpitations and leg swelling  Gastrointestinal: Negative for abdominal pain, blood in stool, diarrhea, nausea and vomiting  Endocrine: Negative for cold intolerance and heat intolerance  Genitourinary: Negative for difficulty urinating  Musculoskeletal: Negative for arthralgias, back pain, gait problem, joint swelling and myalgias  Skin: Negative for rash  Neurological: Negative for dizziness, speech difficulty, weakness and headaches  Psychiatric/Behavioral: Negative for behavioral problems, confusion, self-injury and suicidal ideas  All other systems reviewed and are negative  Physical Exam  Physical Exam   Constitutional: She is oriented to person, place, and time  She appears well-developed and well-nourished  HENT:   Head: Normocephalic and atraumatic  Nose: Nose normal    Eyes: Pupils are equal, round, and reactive to light  EOM are normal    Neck: Normal range of motion  Neck supple  Cardiovascular: Normal rate, regular rhythm and normal heart sounds  Exam reveals no gallop and no friction rub  No murmur heard  Pulmonary/Chest: Effort normal  No respiratory distress  She has no wheezes  She has no rales  Diminished breath sounds   Abdominal: Soft  She exhibits no distension  There is no tenderness  There is no rebound and no guarding  No CVA tenderness   Musculoskeletal: Normal range of motion  She exhibits no edema  Neurological: She is alert and oriented to person, place, and time  Skin: Skin is warm and dry  Psychiatric: She has a normal mood and affect  Her behavior is normal  Judgment and thought content normal    Nursing note and vitals reviewed        Vital Signs  ED Triage Vitals [11/27/18 0848]   Temperature Pulse Respirations Blood Pressure SpO2   98 3 °F (36 8 °C) 102 16 154/63 98 %      Temp Source Heart Rate Source Patient Position - Orthostatic VS BP Location FiO2 (%)   Tympanic Monitor Sitting Left arm --      Pain Score       Worst Possible Pain           Vitals:    11/27/18 0848 11/27/18 1229 11/27/18 1258   BP: 154/63 150/79 155/78   Pulse: 102 78 78   Patient Position - Orthostatic VS: Sitting Lying Lying       Visual Acuity      ED Medications  Medications   ketorolac (TORADOL) injection 15 mg (15 mg Intravenous Given 11/27/18 0856)   HYDROmorphone (DILAUDID) injection 0 5 mg (0 5 mg Intravenous Given 11/27/18 1024)   iohexol (OMNIPAQUE) 350 MG/ML injection (SINGLE-DOSE) 85 mL (85 mL Intravenous Given 11/27/18 1102)   HYDROmorphone (DILAUDID) injection 0 5 mg (0 5 mg Intravenous Given 11/27/18 1220)       Diagnostic Studies  Results Reviewed     Procedure Component Value Units Date/Time    UA w Reflex to Microscopic [[id number]]  (Normal) Collected:  11/27/18 0940    Lab Status:  Final result Specimen:  Urine from Urine, Clean Catch Updated:  11/27/18 0946     Color, UA Yellow     Clarity, UA Clear     Specific Gravity, UA 1 010     pH, UA 7 5     Leukocytes, UA Negative     Nitrite, UA Negative     Protein, UA Negative mg/dl      Glucose, UA Negative mg/dl      Ketones, UA Negative mg/dl      Urobilinogen, UA 0 2 E U /dl      Bilirubin, UA Negative     Blood, UA Negative    Comprehensive metabolic panel [[id number]]  (Abnormal) Collected:  11/27/18 0855    Lab Status:  Final result Specimen:  Blood from Arm, Left Updated:  11/27/18 0934     Sodium 132 (L) mmol/L      Potassium 3 9 mmol/L      Chloride 95 (L) mmol/L      CO2 31 mmol/L      ANION GAP 6 mmol/L      BUN 14 mg/dL      Creatinine 0 59 (L) mg/dL      Glucose 118 mg/dL      Calcium 9 5 mg/dL      AST 25 U/L      ALT 38 U/L      Alkaline Phosphatase 52 (L) U/L      Total Protein 7 1 g/dL      Albumin 4 5 g/dL      Total Bilirubin 0 40 mg/dL      eGFR 86 ml/min/1 73sq m     Narrative:         National Kidney Disease Education Program recommendations are as follows:  GFR calculation is accurate only with a steady state creatinine  Chronic Kidney disease less than 60 ml/min/1 73 sq  meters  Kidney failure less than 15 ml/min/1 73 sq  meters  [place] draw [[id number]] Collected:  11/27/18 0857    Lab Status:  Final result Specimen:  Blood Updated:  11/27/18 0908    Narrative: The following orders were created for panel order [place] draw  Procedure                               Abnormality         Status                     ---------                               -----------         ------                     Derian Kyrally Top on hold[[id number]]                                                      Green / Yellow tube on hold[[id number]]                      Final result                 Please view results for these tests on the individual orders  CBC and differential [[id number]]  (Abnormal) Collected:  11/27/18 0855    Lab Status:  Final result Specimen:  Blood from Arm, Left Updated:  11/27/18 0907     WBC 7 50 Thousand/uL      RBC 5 00 Million/uL      Hemoglobin 13 8 g/dL      Hematocrit 41 6 %      MCV 83 fL      MCH 27 6 pg      MCHC 33 1 g/dL      RDW 14 2 %      MPV 7 8 (L) fL      Platelets 360 Thousands/uL      nRBC 0 /100 WBCs      Neutrophils Relative 72 %      Lymphocytes Relative 16 %      Monocytes Relative 9 %      Eosinophils Relative 2 %      Basophils Relative 1 %      Neutrophils Absolute 5 40 Thousands/µL      Lymphocytes Absolute 1 20 Thousands/µL      Monocytes Absolute 0 70 Thousand/µL      Eosinophils Absolute 0 10 Thousand/µL      Basophils Absolute 0 10 Thousands/µL                  CTA dissection protocol chest and abdomen   Final Result by Interface, Radiology Results In (11/27 1219)   CTA chest and abdomen:    No evidence of dissection or vascular compromise of any vessel  CHEST:    Severe emphysema  No evidence of infiltrate  Abdomen and pelvis:   1  Large right renal cyst    2   Stool-filled colon  3   Fatty liver              Signed by NEISHA Owens       CT renal stone study abdomen pelvis without contrast   Final Result by Ike Wick MD (11/27 6911)      1  There are no findings of renal calculus, hydronephrosis or   hydroureter  There is 6 8 x 5 2 cm cyst at the midpole of the right   kidney with noncontrast Hounsfield attenuation of 8 2 which can be better   evaluated with renal ultrasound  2   There is pancolonic diverticulosis  3   Thoracic and lumbar spine multilevel moderate osteoarthritic and   vacuum disc degenerative changes  There is thoracolumbar scoliosis  4   There are coronary artery, atherosclerotic aortic and vascular   calcifications  Signed by Ike Wick                 Procedures  Procedures       Phone Contacts  ED Phone Contact    ED Course  ED Course as of Nov 27 1922 Tue Nov 27, 2018   1234 Unclear etiology patient's back pain  Her ER workup has been normal   Will discharge home with pain medicine and for follow with her PMD                                 MDM  CritCare Time    Disposition  Final diagnoses:   Back pain     Time reflects when diagnosis was documented in both MDM as applicable and the Disposition within this note     Time User Action Codes Description Comment    11/27/2018 12:36 PM Caridad Uribe [M54 9] Back pain       ED Disposition     ED Disposition Condition Comment    Discharge  Edison Lemus discharge to home/self care      Condition at discharge: Good        Follow-up Information    None         Discharge Medication List as of 11/27/2018 12:38 PM      START taking these medications    Details   acetaminophen-codeine (TYLENOL #3) 300-30 mg per tablet Take 1-2 tablets by mouth every 6 (six) hours as needed for moderate pain for up to 10 days, Starting Tue 11/27/2018, Until Fri 12/7/2018, Print      docusate sodium (COLACE) 250 MG capsule Take 1 capsule (250 mg total) by mouth daily, Starting Tue 11/27/2018, Print         CONTINUE these medications which have NOT CHANGED    Details   cyclobenzaprine (FLEXERIL) 10 mg tablet Take 1 tablet (10 mg total) by mouth 3 (three) times a day for 5 days, Starting Tue 11/27/2018, Until Sun 12/2/2018, Print      ketorolac (TORADOL) 10 mg tablet Take 1 tablet (10 mg total) by mouth 3 (three) times a day for 5 days, Starting Tue 11/27/2018, Until Sun 12/2/2018, Print           No discharge procedures on file      ED Provider  Electronically Signed by           Philippe Smith MD  11/27/18 1085       Philippe Smith MD  11/27/18 Santos Mcfarlane MD  11/27/18 Olive Vicente

## 2018-11-27 NOTE — ED NOTES
IV was removed as when patient returned from CT scan, the catheter was half out of the arm and bent in half  Would not flush or advance back into skin        Sameer Yanez RN  11/27/18 3660

## 2018-12-28 ENCOUNTER — APPOINTMENT (EMERGENCY)
Dept: RADIOLOGY | Facility: HOSPITAL | Age: 83
DRG: 379 | End: 2018-12-28
Payer: MEDICARE

## 2018-12-28 ENCOUNTER — APPOINTMENT (EMERGENCY)
Dept: CT IMAGING | Facility: HOSPITAL | Age: 83
DRG: 379 | End: 2018-12-28
Payer: MEDICARE

## 2018-12-28 ENCOUNTER — HOSPITAL ENCOUNTER (INPATIENT)
Facility: HOSPITAL | Age: 83
LOS: 3 days | Discharge: HOME | DRG: 379 | End: 2018-12-31
Attending: INTERNAL MEDICINE | Admitting: INTERNAL MEDICINE
Payer: MEDICARE

## 2018-12-28 ENCOUNTER — HOSPITAL ENCOUNTER (EMERGENCY)
Facility: HOSPITAL | Age: 83
DRG: 379 | End: 2018-12-28
Payer: MEDICARE

## 2018-12-28 VITALS
OXYGEN SATURATION: 97 % | TEMPERATURE: 98 F | BODY MASS INDEX: 27.43 KG/M2 | HEIGHT: 62 IN | DIASTOLIC BLOOD PRESSURE: 75 MMHG | WEIGHT: 149.03 LBS | HEART RATE: 85 BPM | SYSTOLIC BLOOD PRESSURE: 118 MMHG | RESPIRATION RATE: 16 BRPM

## 2018-12-28 DIAGNOSIS — K62.5 RECTAL BLEED: Primary | ICD-10-CM

## 2018-12-28 DIAGNOSIS — K57.11 DIVERTICULOSIS OF SMALL INTESTINE WITH HEMORRHAGE: Primary | Chronic | ICD-10-CM

## 2018-12-28 DIAGNOSIS — K57.91 DIVERTICULOSIS OF INTESTINE WITH BLEEDING, UNSPECIFIED INTESTINAL TRACT LOCATION: Chronic | ICD-10-CM

## 2018-12-28 PROBLEM — J44.9 COPD (CHRONIC OBSTRUCTIVE PULMONARY DISEASE) (HCC): Chronic | Status: ACTIVE | Noted: 2018-12-28

## 2018-12-28 PROBLEM — I51.9 CARDIAC DISEASE: Chronic | Status: ACTIVE | Noted: 2018-12-28

## 2018-12-28 PROBLEM — J45.909 ASTHMA: Chronic | Status: ACTIVE | Noted: 2018-12-28

## 2018-12-28 LAB
ALBUMIN SERPL BCP-MCNC: 3.2 G/DL (ref 3.5–5.7)
ALP SERPL-CCNC: 85 U/L (ref 55–165)
ALT SERPL W P-5'-P-CCNC: 134 U/L (ref 7–52)
ANION GAP SERPL CALCULATED.3IONS-SCNC: 8 MMOL/L (ref 4–13)
APTT PPP: 30 SECONDS (ref 26–38)
AST SERPL W P-5'-P-CCNC: 64 U/L (ref 13–39)
ATRIAL RATE: 91 BPM
BASOPHILS # BLD AUTO: 0 THOUSANDS/ΜL (ref 0–0.1)
BASOPHILS NFR BLD AUTO: 1 % (ref 0–1)
BILIRUB SERPL-MCNC: 0.2 MG/DL (ref 0.2–1)
BUN SERPL-MCNC: 22 MG/DL (ref 7–25)
CALCIUM SERPL-MCNC: 8.3 MG/DL (ref 8.6–10.5)
CHLORIDE SERPL-SCNC: 102 MMOL/L (ref 98–107)
CO2 SERPL-SCNC: 28 MMOL/L (ref 21–31)
CREAT SERPL-MCNC: 0.5 MG/DL (ref 0.6–1.2)
EOSINOPHIL # BLD AUTO: 0.2 THOUSAND/ΜL (ref 0–0.61)
EOSINOPHIL NFR BLD AUTO: 3 % (ref 0–6)
ERYTHROCYTE [DISTWIDTH] IN BLOOD BY AUTOMATED COUNT: 14.4 % (ref 11.6–15.1)
GFR SERPL CREATININE-BSD FRML MDRD: 91 ML/MIN/1.73SQ M
GLUCOSE SERPL-MCNC: 132 MG/DL (ref 65–140)
HCT VFR BLD AUTO: 32.5 % (ref 37–47)
HGB BLD-MCNC: 10.7 G/DL (ref 11.5–15.4)
INR PPP: 1.11 (ref 0.9–1.5)
LIPASE SERPL-CCNC: 10 U/L (ref 11–82)
LYMPHOCYTES # BLD AUTO: 1.3 THOUSANDS/ΜL (ref 0.6–4.47)
LYMPHOCYTES NFR BLD AUTO: 22 % (ref 14–44)
MCH RBC QN AUTO: 27.7 PG (ref 26.8–34.3)
MCHC RBC AUTO-ENTMCNC: 33 G/DL (ref 31.4–37.4)
MCV RBC AUTO: 84 FL (ref 82–98)
MONOCYTES # BLD AUTO: 0.7 THOUSAND/ΜL (ref 0.17–1.22)
MONOCYTES NFR BLD AUTO: 11 % (ref 4–12)
NEUTROPHILS # BLD AUTO: 3.7 THOUSANDS/ΜL (ref 1.85–7.62)
NEUTS SEG NFR BLD AUTO: 63 % (ref 43–75)
NRBC BLD AUTO-RTO: 0 /100 WBCS
P AXIS: 92 DEGREES
PLATELET # BLD AUTO: 239 THOUSANDS/UL (ref 149–390)
PMV BLD AUTO: 7.6 FL (ref 8.9–12.7)
POTASSIUM SERPL-SCNC: 4.1 MMOL/L (ref 3.5–5.5)
PR INTERVAL: 136 MS
PROT SERPL-MCNC: 5.5 G/DL (ref 6.4–8.9)
PROTHROMBIN TIME: 12.8 SECONDS (ref 10.2–13)
QRS AXIS: -24 DEGREES
QRSD INTERVAL: 86 MS
QT INTERVAL: 356 MS
QTC INTERVAL: 437 MS
RBC # BLD AUTO: 3.87 MILLION/UL (ref 3.81–5.12)
SODIUM SERPL-SCNC: 138 MMOL/L (ref 134–143)
T WAVE AXIS: 47 DEGREES
TROPONIN I SERPL-MCNC: <0.03 NG/ML
VENTRICULAR RATE: 91 BPM
WBC # BLD AUTO: 5.9 THOUSAND/UL (ref 4.31–10.16)

## 2018-12-28 PROCEDURE — 85025 COMPLETE CBC W/AUTO DIFF WBC: CPT

## 2018-12-28 PROCEDURE — 83690 ASSAY OF LIPASE: CPT

## 2018-12-28 PROCEDURE — 85730 THROMBOPLASTIN TIME PARTIAL: CPT

## 2018-12-28 PROCEDURE — 99285 EMERGENCY DEPT VISIT HI MDM: CPT

## 2018-12-28 PROCEDURE — 96366 THER/PROPH/DIAG IV INF ADDON: CPT

## 2018-12-28 PROCEDURE — 74176 CT ABD & PELVIS W/O CONTRAST: CPT

## 2018-12-28 PROCEDURE — C9113 INJ PANTOPRAZOLE SODIUM, VIA: HCPCS

## 2018-12-28 PROCEDURE — 96365 THER/PROPH/DIAG IV INF INIT: CPT

## 2018-12-28 PROCEDURE — 80053 COMPREHEN METABOLIC PANEL: CPT

## 2018-12-28 PROCEDURE — 93005 ELECTROCARDIOGRAM TRACING: CPT

## 2018-12-28 PROCEDURE — 93010 ELECTROCARDIOGRAM REPORT: CPT | Performed by: INTERNAL MEDICINE

## 2018-12-28 PROCEDURE — 85610 PROTHROMBIN TIME: CPT

## 2018-12-28 PROCEDURE — C9113 INJ PANTOPRAZOLE SODIUM, VIA: HCPCS | Performed by: NURSE PRACTITIONER

## 2018-12-28 PROCEDURE — 84484 ASSAY OF TROPONIN QUANT: CPT

## 2018-12-28 PROCEDURE — 99223 1ST HOSP IP/OBS HIGH 75: CPT | Performed by: NURSE PRACTITIONER

## 2018-12-28 PROCEDURE — 36415 COLL VENOUS BLD VENIPUNCTURE: CPT

## 2018-12-28 PROCEDURE — 71045 X-RAY EXAM CHEST 1 VIEW: CPT

## 2018-12-28 RX ORDER — CHLORHEXIDINE GLUCONATE 0.12 MG/ML
15 RINSE ORAL EVERY 12 HOURS SCHEDULED
Status: DISCONTINUED | OUTPATIENT
Start: 2018-12-28 | End: 2018-12-31 | Stop reason: HOSPADM

## 2018-12-28 RX ORDER — ONDANSETRON 2 MG/ML
4 INJECTION INTRAMUSCULAR; INTRAVENOUS EVERY 6 HOURS PRN
Status: DISCONTINUED | OUTPATIENT
Start: 2018-12-28 | End: 2018-12-31 | Stop reason: HOSPADM

## 2018-12-28 RX ORDER — POTASSIUM CHLORIDE 20 MEQ/1
20 TABLET, EXTENDED RELEASE ORAL DAILY
Status: DISCONTINUED | OUTPATIENT
Start: 2018-12-29 | End: 2018-12-31 | Stop reason: HOSPADM

## 2018-12-28 RX ORDER — MONTELUKAST SODIUM 10 MG/1
10 TABLET ORAL
Status: DISCONTINUED | OUTPATIENT
Start: 2018-12-28 | End: 2018-12-31 | Stop reason: HOSPADM

## 2018-12-28 RX ORDER — FUROSEMIDE 20 MG/1
20 TABLET ORAL
Status: DISCONTINUED | OUTPATIENT
Start: 2018-12-29 | End: 2018-12-31 | Stop reason: HOSPADM

## 2018-12-28 RX ORDER — ALBUTEROL SULFATE 2.5 MG/3ML
2.5 SOLUTION RESPIRATORY (INHALATION) EVERY 4 HOURS PRN
Status: DISCONTINUED | OUTPATIENT
Start: 2018-12-28 | End: 2018-12-31 | Stop reason: HOSPADM

## 2018-12-28 RX ORDER — ACETAMINOPHEN 325 MG/1
650 TABLET ORAL EVERY 6 HOURS PRN
Status: DISCONTINUED | OUTPATIENT
Start: 2018-12-28 | End: 2018-12-31 | Stop reason: HOSPADM

## 2018-12-28 RX ORDER — SODIUM CHLORIDE 9 MG/ML
125 INJECTION, SOLUTION INTRAVENOUS CONTINUOUS
Status: DISCONTINUED | OUTPATIENT
Start: 2018-12-28 | End: 2018-12-28 | Stop reason: HOSPADM

## 2018-12-28 RX ORDER — POTASSIUM CHLORIDE 750 MG/1
10 CAPSULE, EXTENDED RELEASE ORAL DAILY
Status: ON HOLD | COMMUNITY
End: 2019-11-04 | Stop reason: SDUPTHER

## 2018-12-28 RX ORDER — FUROSEMIDE 20 MG/1
20 TABLET ORAL DAILY
Status: ON HOLD | COMMUNITY
End: 2019-11-04 | Stop reason: SDUPTHER

## 2018-12-28 RX ORDER — MONTELUKAST SODIUM 10 MG/1
10 TABLET ORAL
Status: ON HOLD | COMMUNITY
End: 2019-11-04 | Stop reason: SDUPTHER

## 2018-12-28 RX ADMIN — CHLORHEXIDINE GLUCONATE 0.12% ORAL RINSE 15 ML: 1.2 LIQUID ORAL at 20:56

## 2018-12-28 RX ADMIN — SODIUM CHLORIDE 8 MG/HR: 9 INJECTION, SOLUTION INTRAVENOUS at 20:53

## 2018-12-28 RX ADMIN — Medication 80 MG: at 13:27

## 2018-12-28 RX ADMIN — Medication 8 MG/HR: at 14:02

## 2018-12-28 RX ADMIN — MONTELUKAST SODIUM 10 MG: 10 TABLET, COATED ORAL at 21:06

## 2018-12-28 RX ADMIN — SODIUM CHLORIDE 125 ML/HR: 0.9 INJECTION, SOLUTION INTRAVENOUS at 13:23

## 2018-12-28 NOTE — EMTALA/ACUTE CARE TRANSFER
[facility]   [address] [place] [id number]  [phone]  Dept: [phone]      EMTALA TRANSFER CONSENT    NAME Mirna Aleman                                         1935                              MRN [id number]    I have been informed of my rights regarding examination, treatment, and transfer   by Dr Margarito Naik MD    Benefits: Continuity of care    Risks: Potential for delay in receiving treatment, Potential deterioration of medical condition, Loss of IV, Increased discomfort during transfer, Possible worsening of condition or death during transfer      Transfer Request   I acknowledge that my medical condition has been evaluated and explained to me by the emergency department physician or other qualified medical person and/or my attending physician who has recommended and offered to me further medical examination and treatment  I understand the Hospital's obligation with respect to the treatment and stabilization of my emergency medical condition  I nevertheless request to be transferred  I release the Hospital, the doctor, and any other persons caring for me from all responsibility or liability for any injury or ill effects that may result from my transfer and agree to accept all responsibility for the consequences of my choice to transfer, rather than receive stabilizing treatment at the Hospital  I understand that because the transfer is my request, my insurance may not provide reimbursement for the services  The Hospital will assist and direct me and my family in how to make arrangements for transfer, but the hospital is not liable for any fees charged by the transport service  In spite of this understanding, I refuse to consent to further medical examination and treatment which has been offered to me, and request transfer to  [facility] [address], [address] : [facility]/[place], [place]   I authorize the performance of emergency medical procedures and treatments upon me in both transit and upon arrival at the receiving facility  Additionally, I authorize the release of any and all medical records to the receiving facility and request they be transported with me, if possible  I authorize the performance of emergency medical procedures and treatments upon me in both transit and upon arrival at the receiving facility  Additionally, I authorize the release of any and all medical records to the receiving facility and request they be transported with me, if possible  I understand that the safest mode of transportation during a medical emergency is an ambulance and that the Hospital advocates the use of this mode of transport  Risks of traveling to the receiving facility by car, including absence of medical control, life sustaining equipment, such as oxygen, and medical personnel has been explained to me and I fully understand them  (NANDA CORRECT BOX BELOW)  [ X ]  I consent to the stated transfer and to be transported by ambulance/helicopter  [  ]  I consent to the stated transfer, but refuse transportation by ambulance and accept full responsibility for my transportation by car  I understand the risks of non-ambulance transfers and I exonerate the Hospital and its staff from any deterioration in my condition that results from this refusal     X___________________________________________    DATE  18  TIME________  Signature of patient or legally responsible individual signing on patient behalf           RELATIONSHIP TO PATIENT_________________________          Provider Certification    NAME Yanira Samuels                                         1935                              MRN [id number]    A medical screening exam was performed on the above named patient  Based on the examination:    Condition Necessitating Transfer The encounter diagnosis was Rectal bleed  Patient Condition:  An emergency transfer is being made prior to stabilization due to the need for definitive care and the benefit of transfer outweighs the risk    Reason for Transfer: No bed available at level of patient's needs    Transfer Requirements: [address], [place], [place]   · Space available and qualified personnel available for treatment as acknowledged by    · Agreed to accept transfer and to provide appropriate medical treatment as acknowledged by       Dr Corona Rice  · Appropriate medical records of the examination and treatment of the patient are provided at the time of transfer   [address], [address] _______  · Transfer will be performed by qualified personnel from    and appropriate transfer equipment as required, including the use of necessary and appropriate life support measures  Provider Certification: I have examined the patient and explained the following risks and benefits of being transferred/refusing transfer to the patient/family:  General risk, such as traffic hazards, adverse weather conditions, rough terrain or turbulence, possible failure of equipment (including vehicle or aircraft), or consequences of actions of persons outside the control of the transport personnel, Unanticipated needs of medical equipment and personnel during transport, Risk of worsening condition, The possibility of a transport vehicle being unavailable      Based on these reasonable risks and benefits to the patient and/or the unborn child(alesha), and based upon the information available at the time of the patients examination, I certify that the medical benefits reasonably to be expected from the provision of appropriate medical treatments at another medical facility outweigh the increasing risks, if any, to the individuals medical condition, and in the case of labor to the unborn child, from effecting the transfer      X____________________________________________ DATE 12/28/18 TIME_______      ORIGINAL - SEND TO MEDICAL RECORDS   COPY - SEND WITH PATIENT DURING TRANSFER

## 2018-12-28 NOTE — ED PROVIDER NOTES
History  Chief Complaint   Patient presents with    Rectal Bleeding     Alyssa Bucio is an 82-year-old female came to the emergency department due to rectal bleeding today preceded by diarrhea for the last the 3 days  Patient also states that she is weak and short of breath  Patient denies vomiting  History provided by:  Patient   used: No    Rectal Bleeding - Minor   Quality:  Maroon  Amount:  Scant  Duration:  9 hours  Timing:  Constant  Chronicity:  New  Context: diarrhea    Context: not anal fissures, not anal penetration, not constipation, not defecation, not foreign body, not hemorrhoids, not rectal injury, not rectal pain and not spontaneously    Similar prior episodes: no    Relieved by:  Nothing  Worsened by:  Nothing  Ineffective treatments:  None tried  Associated symptoms: no abdominal pain, no dizziness, no epistaxis, no fever, no hematemesis, no light-headedness, no loss of consciousness, no recent illness and no vomiting    Risk factors: no anticoagulant use, no hx of IBD and no NSAID use        Prior to Admission Medications   Prescriptions Last Dose Informant Patient Reported? Taking?   fluticasone-umeclidinium-vilanterol (TRELEGY ELLIPTA) 100-62 5-25 MCG/INH inhaler  Self Yes Yes   Sig: Inhale 1 puff daily Rinse mouth after use  furosemide (LASIX) 20 mg tablet  Self Yes Yes   Sig: Take 20 mg by mouth daily   montelukast (SINGULAIR) 10 mg tablet  Self Yes Yes   Sig: Take 10 mg by mouth daily at bedtime   potassium chloride (MICRO-K) 10 MEQ CR capsule  Self Yes Yes   Sig: Take 10 mEq by mouth daily      Facility-Administered Medications: None       Past Medical History:   Diagnosis Date    Asthma     Cardiac disease     COPD (chronic obstructive pulmonary disease) ([facility] [address] )        Past Surgical History:   Procedure Laterality Date    COLON SURGERY         History reviewed  No pertinent family history    I have reviewed and agree with the history as documented  Social History   Substance Use Topics    Smoking status: Former Smoker     Quit date: 11/27/2013    Smokeless tobacco: Never Used    Alcohol use No        Review of Systems   Constitutional: Negative for fever  HENT: Negative for nosebleeds  Eyes: Negative  Respiratory: Negative  Gastrointestinal: Positive for blood in stool, diarrhea and hematochezia  Negative for abdominal pain, hematemesis and vomiting  Endocrine: Negative  Genitourinary: Negative  Musculoskeletal: Negative  Skin: Negative  Allergic/Immunologic: Negative  Neurological: Negative for dizziness, loss of consciousness and light-headedness  Hematological: Negative  Psychiatric/Behavioral: Negative  Physical Exam  Physical Exam   Constitutional: She is oriented to person, place, and time  She appears well-developed and well-nourished  No distress  HENT:   Head: Normocephalic and atraumatic  Right Ear: External ear normal    Left Ear: External ear normal    Nose: Nose normal    Mouth/Throat: Oropharynx is clear and moist  No oropharyngeal exudate  Eyes: Pupils are equal, round, and reactive to light  Conjunctivae and EOM are normal  Right eye exhibits no discharge  Left eye exhibits no discharge  No scleral icterus  Neck: Normal range of motion  Neck supple  No tracheal deviation present  No thyromegaly present  Cardiovascular: Normal rate, regular rhythm, normal heart sounds and intact distal pulses  Pulmonary/Chest: Effort normal and breath sounds normal  No respiratory distress  Abdominal: Soft  Bowel sounds are normal  She exhibits no distension  There is no tenderness  Genitourinary: Rectal exam shows guaiac positive stool  Genitourinary Comments: Blood is noted on the examining finger   Musculoskeletal: Normal range of motion  She exhibits no edema, tenderness or deformity  Lymphadenopathy:     She has no cervical adenopathy     Neurological: She is alert and oriented to person, place, and time  No cranial nerve deficit or sensory deficit  She exhibits normal muscle tone  Coordination normal    Skin: Skin is warm and dry  No rash noted  She is not diaphoretic  No erythema  No pallor  Psychiatric: She has a normal mood and affect  Her behavior is normal  Judgment and thought content normal    Nursing note and vitals reviewed        Vital Signs  ED Triage Vitals [12/28/18 1246]   Temperature Pulse Respirations Blood Pressure SpO2   98 2 °F (36 8 °C) 92 18 111/52 96 %      Temp Source Heart Rate Source Patient Position - Orthostatic VS BP Location FiO2 (%)   Tympanic Monitor Sitting Left arm --      Pain Score       No Pain           Vitals:    12/28/18 1246 12/28/18 1500 12/28/18 1615   BP: 111/52 104/52 133/70   Pulse: 92 88 84   Patient Position - Orthostatic VS: Sitting Sitting Sitting       Visual Acuity      ED Medications  Medications   sodium chloride 0 9 % infusion (125 mL/hr Intravenous New Bag 12/28/18 1323)   pantoprazole (PROTONIX) 80 mg in sodium chloride 0 9 % 100 mL infusion (8 mg/hr Intravenous New Bag 12/28/18 1402)   pantoprazole (PROTONIX) 80 mg in sodium chloride 0 9 % 100 mL IVPB (0 mg Intravenous Stopped 12/28/18 1401)       Diagnostic Studies  Results Reviewed     Procedure Component Value Units Date/Time    Troponin I [[id number]]  (Normal) Collected:  12/28/18 1300    Lab Status:  Final result Specimen:  Blood from Hand, Right Updated:  12/28/18 1333     Troponin I <0 03 ng/mL     Lipase [[id number]]  (Abnormal) Collected:  12/28/18 1300    Lab Status:  Final result Specimen:  Blood from Hand, Right Updated:  12/28/18 1331     Lipase 10 (L) u/L     Comprehensive metabolic panel [[id number]]  (Abnormal) Collected:  12/28/18 1300    Lab Status:  Final result Specimen:  Blood from Hand, Right Updated:  12/28/18 1330     Sodium 138 mmol/L      Potassium 4 1 mmol/L      Chloride 102 mmol/L      CO2 28 mmol/L      ANION GAP 8 mmol/L      BUN 22 mg/dL      Creatinine 0 50 (L) mg/dL      Glucose 132 mg/dL      Calcium 8 3 (L) mg/dL      AST 64 (H) U/L       (H) U/L      Alkaline Phosphatase 85 U/L      Total Protein 5 5 (L) g/dL      Albumin 3 2 (L) g/dL      Total Bilirubin 0 20 mg/dL      eGFR 91 ml/min/1 73sq m     Narrative:         National Kidney Disease Education Program recommendations are as follows:  GFR calculation is accurate only with a steady state creatinine  Chronic Kidney disease less than 60 ml/min/1 73 sq  meters  Kidney failure less than 15 ml/min/1 73 sq  meters  Protime-INR [[id number]]  (Normal) Collected:  12/28/18 1300    Lab Status:  Final result Specimen:  Blood from Hand, Right Updated:  12/28/18 1329     Protime 12 8 seconds      INR 1 11    APTT [[id number]]  (Normal) Collected:  12/28/18 1300    Lab Status:  Final result Specimen:  Blood from Hand, Right Updated:  12/28/18 1329     PTT 30 seconds     CBC and differential [[id number]]  (Abnormal) Collected:  12/28/18 1300    Lab Status:  Final result Specimen:  Blood from Hand, Right Updated:  12/28/18 1312     WBC 5 90 Thousand/uL      RBC 3 87 Million/uL      Hemoglobin 10 7 (L) g/dL      Hematocrit 32 5 (L) %      MCV 84 fL      MCH 27 7 pg      MCHC 33 0 g/dL      RDW 14 4 %      MPV 7 6 (L) fL      Platelets 851 Thousands/uL      nRBC 0 /100 WBCs      Neutrophils Relative 63 %      Lymphocytes Relative 22 %      Monocytes Relative 11 %      Eosinophils Relative 3 %      Basophils Relative 1 %      Neutrophils Absolute 3 70 Thousands/µL      Lymphocytes Absolute 1 30 Thousands/µL      Monocytes Absolute 0 70 Thousand/µL      Eosinophils Absolute 0 20 Thousand/µL      Basophils Absolute 0 00 Thousands/µL                  CT abdomen pelvis wo contrast   Final Result by Justice Cervantes MD (12/28 0306)      1    There is pancolonic diverticulosis, however, the evaluation of   gastrointestinal tract is limited due to lack of contrast   The further   evaluation with colonoscopy would be advised with patient history of   hematochezia  2   The liver demonstrates homogeneous low attenuation without focal mass   lesion representing fatty infiltration  3   The fat-containing anterior abdominal hernia measures 5 0 x 3 0 cm    4   Thoracic and lumbar spine multilevel moderate osteoarthritic and   vacuum disc degenerative changes  There is thoracolumbar scoliosis  5   There are coronary artery, atherosclerotic aortic and vascular   calcifications  Signed by Luis Hernandez      XR chest 1 view portable   ED Interpretation by Rubin Rivas MD (12/28 7882)   No acute disease      Final Result by Hilario Shaw (12/28 1401)   Findings may represent early interstitial pulmonary edema, for clinical   correlation  Signed by Hilario Shaw MD                 Procedures  ECG 12 Lead Documentation  Date/Time: 12/28/2018 1:19 PM  Performed by: TRINI Shepard  Authorized by: TRINI Shepard     Indications / Diagnosis:  Rectal bleed  ECG reviewed by me, the ED Provider: yes    Patient location:  ED  Previous ECG:     Previous ECG:  Unavailable    Comparison to cardiac monitor: Yes    Interpretation:     Interpretation: non-specific    Rate:     ECG rate:  91 beats per minute    ECG rate assessment: normal    Rhythm:     Rhythm: sinus rhythm    Ectopy:     Ectopy: PAC    QRS:     QRS axis:  Normal    QRS intervals:  Normal  Conduction:     Conduction: normal    ST segments:     ST segments:  Non-specific  T waves:     T waves: non-specific             Phone Contacts  ED Phone Contact    ED Course  ED Course as of Dec 28 1756   Fri Dec 28, 2018   1335 Case was discussed with hospitalist on call about plan for admission and she agreed  1342 Blood test results were discussed with the patient and I informed her about the for her to be admitted into the hospital   Patient agrees  I informed her that the results of the CT scan of the abdomen and is still pending at this time  Chest x-ray was unremarkable  MDM  Number of Diagnoses or Management Options  Rectal bleed: new and requires workup     Amount and/or Complexity of Data Reviewed  Clinical lab tests: ordered and reviewed  Tests in the radiology section of CPT®: ordered and reviewed  Tests in the medicine section of CPT®: ordered and reviewed  Decide to obtain previous medical records or to obtain history from someone other than the patient: yes  Review and summarize past medical records: yes  Discuss the patient with other providers: yes  Independent visualization of images, tracings, or specimens: yes    Risk of Complications, Morbidity, and/or Mortality  Presenting problems: moderate  Diagnostic procedures: moderate  Management options: moderate    Patient Progress  Patient progress: stable    CritCare Time    Disposition  Final diagnoses:   Rectal bleed     Time reflects when diagnosis was documented in both MDM as applicable and the Disposition within this note     Time User Action Codes Description Comment    12/28/2018  1:36 PM [place] Add [K62 5] Rectal bleed       ED Disposition     ED Disposition Condition Comment    Transfer to [address] should be transferred out to [place] [id number]        MD Documentation      Most Recent Value   Patient Condition  An emergency transfer is being made prior to stabilization due to the need for definitive care and the benefit of transfer outweighs the risk   Reason for Transfer  No bed available at level of patient's needs   Benefits of Transfer  Continuity of care   Risks of Transfer  Potential for delay in receiving treatment, Potential deterioration of medical condition, Loss of IV, Increased discomfort during transfer, Possible worsening of condition or death during transfer   Accepting Physician  Dr Sandra Turner Name, Nelida Conner MD NEISHA Morrissey  Provider Certification  General risk, such as traffic hazards, adverse weather conditions, rough terrain or turbulence, possible failure of equipment (including vehicle or aircraft), or consequences of actions of persons outside the control of the transport personnel, Unanticipated needs of medical equipment and personnel during transport, Risk of worsening condition, The possibility of a transport vehicle being unavailable      RN Documentation      Most [address] Name, [address] [facility]   Transport Mode  Ambulance   Level of Care  Advanced life support   Copies of Medical Records Sent  History and Physical   Patient Belongings Disposition  Sent with patient      Follow-up Information    None         Patient's Medications   Discharge Prescriptions    No medications on file     No discharge procedures on file      ED Provider  Electronically Signed by           Tori Urena MD  12/28/18 0214

## 2018-12-28 NOTE — ED NOTES
Pt with 3 additional instances of small maroon liquid bowel movements       Shay Bob RN  12/28/18 9922

## 2018-12-29 PROBLEM — K59.1 FUNCTIONAL DIARRHEA: Chronic | Status: ACTIVE | Noted: 2018-12-29

## 2018-12-29 LAB
ALBUMIN SERPL BCP-MCNC: 3.1 G/DL (ref 3.5–5.7)
ALP SERPL-CCNC: 80 U/L (ref 55–165)
ALT SERPL W P-5'-P-CCNC: 103 U/L (ref 7–52)
ANION GAP SERPL CALCULATED.3IONS-SCNC: 6 MMOL/L (ref 4–13)
AST SERPL W P-5'-P-CCNC: 42 U/L (ref 13–39)
BASOPHILS # BLD AUTO: 0 THOUSANDS/ΜL (ref 0–0.1)
BASOPHILS NFR BLD AUTO: 1 % (ref 0–2)
BILIRUB SERPL-MCNC: 0.2 MG/DL (ref 0.2–1)
BUN SERPL-MCNC: 13 MG/DL (ref 7–25)
CALCIUM SERPL-MCNC: 8.3 MG/DL (ref 8.6–10.5)
CHLORIDE SERPL-SCNC: 108 MMOL/L (ref 98–107)
CO2 SERPL-SCNC: 26 MMOL/L (ref 21–31)
CREAT SERPL-MCNC: 0.47 MG/DL (ref 0.6–1.2)
EOSINOPHIL # BLD AUTO: 0.3 THOUSAND/ΜL (ref 0–0.61)
EOSINOPHIL NFR BLD AUTO: 4 % (ref 0–5)
ERYTHROCYTE [DISTWIDTH] IN BLOOD BY AUTOMATED COUNT: 14.4 % (ref 11.5–14.5)
GFR SERPL CREATININE-BSD FRML MDRD: 93 ML/MIN/1.73SQ M
GLUCOSE SERPL-MCNC: 102 MG/DL (ref 65–99)
HCT VFR BLD AUTO: 30.9 % (ref 34.8–46.1)
HEMOCCULT STL QL: POSITIVE
HGB BLD-MCNC: 10.1 G/DL (ref 12–16)
HGB BLD-MCNC: 10.2 G/DL (ref 12–16)
HGB BLD-MCNC: 10.3 G/DL (ref 12–16)
INR PPP: 1.05 (ref 0.9–1.5)
LYMPHOCYTES # BLD AUTO: 1 THOUSANDS/ΜL (ref 0.6–4.47)
LYMPHOCYTES NFR BLD AUTO: 18 % (ref 21–51)
MCH RBC QN AUTO: 27.8 PG (ref 26–34)
MCHC RBC AUTO-ENTMCNC: 32.7 G/DL (ref 31–37)
MCV RBC AUTO: 85 FL (ref 81–99)
MONOCYTES # BLD AUTO: 0.6 THOUSAND/ΜL (ref 0.17–1.22)
MONOCYTES NFR BLD AUTO: 10 % (ref 2–12)
NEUTROPHILS # BLD AUTO: 3.8 THOUSANDS/ΜL (ref 1.4–6.5)
NEUTS SEG NFR BLD AUTO: 67 % (ref 42–75)
NRBC BLD AUTO-RTO: 0 /100 WBCS
PLATELET # BLD AUTO: 224 THOUSANDS/UL (ref 149–390)
PMV BLD AUTO: 8.2 FL (ref 8.6–11.7)
POTASSIUM SERPL-SCNC: 3.8 MMOL/L (ref 3.5–5.5)
PROT SERPL-MCNC: 5.4 G/DL (ref 6.4–8.9)
PROTHROMBIN TIME: 12.2 SECONDS (ref 10.2–13)
RBC # BLD AUTO: 3.64 MILLION/UL (ref 3.9–5.2)
SODIUM SERPL-SCNC: 140 MMOL/L (ref 134–143)
WBC # BLD AUTO: 5.7 THOUSAND/UL (ref 4.8–10.8)

## 2018-12-29 PROCEDURE — 87493 C DIFF AMPLIFIED PROBE: CPT | Performed by: NURSE PRACTITIONER

## 2018-12-29 PROCEDURE — 97166 OT EVAL MOD COMPLEX 45 MIN: CPT

## 2018-12-29 PROCEDURE — 97161 PT EVAL LOW COMPLEX 20 MIN: CPT

## 2018-12-29 PROCEDURE — 94640 AIRWAY INHALATION TREATMENT: CPT

## 2018-12-29 PROCEDURE — C9113 INJ PANTOPRAZOLE SODIUM, VIA: HCPCS | Performed by: NURSE PRACTITIONER

## 2018-12-29 PROCEDURE — G8978 MOBILITY CURRENT STATUS: HCPCS

## 2018-12-29 PROCEDURE — G8987 SELF CARE CURRENT STATUS: HCPCS

## 2018-12-29 PROCEDURE — 99233 SBSQ HOSP IP/OBS HIGH 50: CPT | Performed by: NURSE PRACTITIONER

## 2018-12-29 PROCEDURE — 80053 COMPREHEN METABOLIC PANEL: CPT | Performed by: NURSE PRACTITIONER

## 2018-12-29 PROCEDURE — 82272 OCCULT BLD FECES 1-3 TESTS: CPT | Performed by: NURSE PRACTITIONER

## 2018-12-29 PROCEDURE — G8989 SELF CARE D/C STATUS: HCPCS

## 2018-12-29 PROCEDURE — 85610 PROTHROMBIN TIME: CPT | Performed by: NURSE PRACTITIONER

## 2018-12-29 PROCEDURE — 85025 COMPLETE CBC W/AUTO DIFF WBC: CPT | Performed by: NURSE PRACTITIONER

## 2018-12-29 PROCEDURE — 94760 N-INVAS EAR/PLS OXIMETRY 1: CPT

## 2018-12-29 PROCEDURE — G8979 MOBILITY GOAL STATUS: HCPCS

## 2018-12-29 PROCEDURE — 94664 DEMO&/EVAL PT USE INHALER: CPT

## 2018-12-29 PROCEDURE — 99221 1ST HOSP IP/OBS SF/LOW 40: CPT | Performed by: SPECIALIST

## 2018-12-29 PROCEDURE — 85018 HEMOGLOBIN: CPT | Performed by: NURSE PRACTITIONER

## 2018-12-29 PROCEDURE — 87505 NFCT AGENT DETECTION GI: CPT | Performed by: NURSE PRACTITIONER

## 2018-12-29 PROCEDURE — G8980 MOBILITY D/C STATUS: HCPCS

## 2018-12-29 PROCEDURE — 87177 OVA AND PARASITES SMEARS: CPT | Performed by: SPECIALIST

## 2018-12-29 PROCEDURE — G8988 SELF CARE GOAL STATUS: HCPCS

## 2018-12-29 PROCEDURE — 87209 SMEAR COMPLEX STAIN: CPT | Performed by: SPECIALIST

## 2018-12-29 RX ORDER — LOPERAMIDE HYDROCHLORIDE 2 MG/1
2 CAPSULE ORAL EVERY 2 HOUR PRN
Status: DISCONTINUED | OUTPATIENT
Start: 2018-12-29 | End: 2018-12-31 | Stop reason: HOSPADM

## 2018-12-29 RX ORDER — SODIUM CHLORIDE 9 MG/ML
50 INJECTION, SOLUTION INTRAVENOUS CONTINUOUS
Status: DISCONTINUED | OUTPATIENT
Start: 2018-12-29 | End: 2018-12-31 | Stop reason: HOSPADM

## 2018-12-29 RX ADMIN — SODIUM CHLORIDE 8 MG/HR: 9 INJECTION, SOLUTION INTRAVENOUS at 17:50

## 2018-12-29 RX ADMIN — MONTELUKAST SODIUM 10 MG: 10 TABLET, COATED ORAL at 21:01

## 2018-12-29 RX ADMIN — CHLORHEXIDINE GLUCONATE 0.12% ORAL RINSE 15 ML: 1.2 LIQUID ORAL at 09:43

## 2018-12-29 RX ADMIN — ALBUTEROL SULFATE 2.5 MG: 2.5 SOLUTION RESPIRATORY (INHALATION) at 17:26

## 2018-12-29 RX ADMIN — POTASSIUM CHLORIDE 20 MEQ: 1500 TABLET, EXTENDED RELEASE ORAL at 09:43

## 2018-12-29 RX ADMIN — SODIUM CHLORIDE 50 ML/HR: 9 INJECTION, SOLUTION INTRAVENOUS at 15:47

## 2018-12-29 RX ADMIN — ACETAMINOPHEN 650 MG: 325 TABLET ORAL at 15:58

## 2018-12-29 RX ADMIN — SODIUM CHLORIDE 8 MG/HR: 9 INJECTION, SOLUTION INTRAVENOUS at 09:53

## 2018-12-29 RX ADMIN — ACETAMINOPHEN 650 MG: 325 TABLET ORAL at 10:29

## 2018-12-29 RX ADMIN — ALBUTEROL SULFATE 2.5 MG: 2.5 SOLUTION RESPIRATORY (INHALATION) at 10:07

## 2018-12-29 RX ADMIN — CHLORHEXIDINE GLUCONATE 0.12% ORAL RINSE 15 ML: 1.2 LIQUID ORAL at 20:57

## 2018-12-29 NOTE — PROGRESS NOTES
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  · She states that at 3:00 a m  This morning she had severe abdominal cramping and a large bowel movement that was dark maroon in color therefore she came to the ED  · Start a Protonix drip     Rectal bleed   Assessment & Plan    · Clear liquid diet no reds  · Consult surgery and GI  · Heme test all stools  · Intake and output  · Patient states that she had liquid diarrhea for 3 days straight approximately 7 days ago  · States that at that time she then started taking Imodium, and 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  · She states that at 3:00 a m  This morning she had severe abdominal cramping and a large bowel movement that was dark maroon in color therefore she came to the ED  · Start a Protonix drip     Cardiac disease   Assessment & Plan    · Stable and chronic       Asthma   Assessment & Plan    · Stable and chronic  · Continue with albuterol neb treatments  · Patient doesn't use inhalers  · Continue singulair     COPD (chronic obstructive pulmonary disease) ([facility])   Assessment & Plan    · Chronic and stable  · Not exacerbation         VTE Prophylaxis: none patient in with GI bleed  Code Status: full  POLST: POLST is not applicable to this patient  Discussion with family:need for protonix drip       Anticipated Length of Stay:  Patient will be admitted on an Inpatient basis with an anticipated length of stay of  > 2 midnights  Justification for Hospital Stay: need for GI and surgical consultation    Total Time for Visit, including Counseling / Coordination of Care: 30 minutes  Greater than 50% of this total time spent on direct patient counseling and coordination of care  Chief Complaint:   GI bleed    History of Present Illness:    Alyssa Bucio is a 82 y o  female who presents with GI bleed  Patient states approximately 1 week ago she had liquid stools for 3 days  She took Kaopectate, and then started taking Imodium  She took Imodium for approximately 5 days without any change in her stools  She did state that towards the 5th day her stool did go from a liquid state towards a more solid soft stool, however 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  At 3:00 a m  Today she woke up with abdominal pain and went to the bathroom and had a large maroon bowel movement with bright red blood  She [facility] emergency department [facility]  She states that she has not had anything to eat or drink since 10:00 p m  On 12/27/2018, and that she has only had 1 small maroon bowel movement in the emergency department  We will start her on a Protonix drip, her CT of her abdomen showed pain clinic diverticulosis with an abdominal hernia  Patient has been unaware of any abdominal hernia  Review of Systems:  Review of Systems   Constitutional: Positive for appetite change  HENT: Negative  Eyes: Negative  Respiratory: Negative  Cardiovascular: Negative  Gastrointestinal: Positive for anal bleeding, blood in stool and diarrhea  Endocrine: Negative  Genitourinary: Negative  Musculoskeletal: Negative  Skin: Negative  Allergic/Immunologic: Negative  Neurological: Negative  Hematological: Negative      Psychiatric/Behavioral: Negative  Past Medical and Surgical History:   Past Medical History:   Diagnosis Date    Asthma     Cardiac disease     COPD (chronic obstructive pulmonary disease) ([facility] [address] )        Past Surgical History:   Procedure Laterality Date    COLON SURGERY         Meds/Allergies:  Prior to Admission medications    Medication Sig Start Date End Date Taking? Authorizing Provider   fluticasone-umeclidinium-vilanterol (TRELEGY ELLIPTA) 100-62 5-25 MCG/INH inhaler Inhale 1 puff daily Rinse mouth after use  Yes Historical Provider, MD   furosemide (LASIX) 20 mg tablet Take 20 mg by mouth daily   Yes Historical Provider, MD   montelukast (SINGULAIR) 10 mg tablet Take 10 mg by mouth daily at bedtime   Yes Historical Provider, MD   potassium chloride (MICRO-K) 10 MEQ CR capsule Take 10 mEq by mouth daily   Yes Historical Provider, MD   cyclobenzaprine (FLEXERIL) 10 mg tablet Take 1 tablet (10 mg total) by mouth 3 (three) times a day for 5 days 11/27/18 12/28/18  Patrica Roberson MD   docusate sodium (COLACE) 250 MG capsule Take 1 capsule (250 mg total) by mouth daily 11/27/18 12/28/18  Pippa Champion MD   ketorolac (TORADOL) 10 mg tablet Take 1 tablet (10 mg total) by mouth 3 (three) times a day for 5 days 11/27/18 12/28/18  Patrica Roberson MD     I have reviewed home medications with patient personally  Allergies: No Known Allergies    Social History:  Marital Status:     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 [employer]  Patient Pre-hospital Living Situation:  At home  Patient Pre-hospital Level of Mobility:  Full  Patient Pre-hospital Diet Restrictions:  None  Substance Use History:     History   Alcohol Use No     History   Smoking Status    Former Smoker    Quit date: 11/27/2013   Smokeless Tobacco    Never Used     History   Drug Use No       Family History:  I have reviewed the patients family history    Physical Exam:   Vitals:   Blood Pressure: 104/62 (12/28/18 1830)  Pulse: 87 (12/28/18 1830)  Temperature: 98 5 °F (36 9 °C) (12/28/18 1830)  Temp Source: Tympanic (12/28/18 1830)  Respirations: 18 (12/28/18 1830)  SpO2: 97 % (12/28/18 1830)    Physical Exam   Constitutional: She is oriented to person, place, and time  Vital signs are normal  She appears well-developed and well-nourished  She is cooperative  HENT:   Head: Normocephalic and atraumatic  Nose: Nose normal    Mouth/Throat: Mucous membranes are normal    Eyes: Pupils are equal, round, and reactive to light  Conjunctivae and EOM are normal    Neck: Normal range of motion and full passive range of motion without pain  Neck supple  Cardiovascular: Normal rate, regular rhythm, normal heart sounds and normal pulses  Pulmonary/Chest: Effort normal and breath sounds normal    Abdominal: Soft  Normal appearance and bowel sounds are normal        Patient states that she has not had anything to eat or drink since 10:00 p m  On 12/27/2018  Due to this she only had 1 small maroon bowel movement in the emergency department  We will allow her to have clear liquid diet and monitor stools  Musculoskeletal: Normal range of motion  Neurological: She is alert and oriented to person, place, and time  Skin: Skin is warm  Psychiatric: She has a normal mood and affect  Her speech is normal and behavior is normal        Additional Data:   Lab Results: I have personally reviewed pertinent reports          Results from last 7 days  Lab Units 12/28/18  1300   WBC Thousand/uL 5 90   HEMOGLOBIN g/dL 10 7*   HEMATOCRIT % 32 5*   PLATELETS Thousands/uL 239   NEUTROS PCT % 63   LYMPHS PCT % 22   MONOS PCT % 11   EOS PCT % 3       Results from last 7 days  Lab Units 12/28/18  1300   POTASSIUM mmol/L 4 1   CHLORIDE mmol/L 102   CO2 mmol/L 28   BUN mg/dL 22   CREATININE mg/dL 0 50*   CALCIUM mg/dL 8 3*   ALK PHOS U/L 85   ALT U/L 134*   AST U/L 64*       Results from last 7 days  Lab Units 12/28/18  1300   INR  1 11               Imaging: I have personally reviewed pertinent reports  No orders to display       EKG, Pathology, and Other Studies Reviewed on Admission:   · EKG:     NetAccess/Epic Records Reviewed: Yes     ** Please Note: This note has been constructed using a voice recognition system   **

## 2018-12-29 NOTE — ASSESSMENT & PLAN NOTE
· Stable and chronic  · Continue with albuterol neb treatments  · Patient doesn't use inhalers  · Continue singulair

## 2018-12-29 NOTE — SOCIAL WORK
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  The  picks her up  Pt states her friends provide driving with her car when needed  Pt states she wears oxygen at HS  Pt utilizes [address] for oxygen  Pt reports she has a portable tank at home if she needs to go home on oxygen and her friend will bring it  Has a friend who will provide transport home upon discharge  Pt gets her medicaitons from [employer]  Pt indicated she will not need any services  CM reviewed d/c planning process including the following: identifying help at home, patient preference for d/c planning needs, availability of treatment team to discuss questions or concerns patient and/or family may have regarding understanding medications and recognizing signs and symptoms once discharged  CM also encouraged patient to follow up with all recommended appointments after discharge  Patient advised of importance for patient and family to participate in managing patients medical well being

## 2018-12-29 NOTE — OCCUPATIONAL THERAPY NOTE
Occupational Therapy Evaluation      Casandra Callahan    12/29/2018    Patient Active Problem List   Diagnosis    Rectal bleed    Asthma    COPD [facility]chronic [facility] disease) ([facility] )    Cardiac disease    Diverticulosis of intestine with bleeding       Past Medical History:   Diagnosis Date    Asthma     Cardiac disease     COPD (chronic obstructive pulmonary disease) ([facility] )        Past Surgical History:   Procedure Laterality Date    COLON SURGERY        12/29/18 1111   Note Type   Note type Eval only   Restrictions/Precautions   Weight Bearing Precautions Per Order No   Other Precautions Contact/isolation;O2;Multiple lines   Pain Assessment   Pain Assessment No/denies pain   Pain Score No Pain   Home Living   Type of Home Mobile home   Home Layout Stairs to enter with rails  (2 SAVANNAH)   Bathroom Shower/Tub Tub/shower unit   Bathroom Toilet Standard   Prior Function   Level of [place] Independent with ADLs and functional mobility   Lives With Alone   ADL Assistance Independent   IADLs Independent   Vocational Retired   Comments assistance for grocery shopping/transportation only   Psychosocial   Psychosocial (WDL) WDL   Subjective   Subjective I don't need anything to walk   agreeable to OT eval   ADL   Eating Assistance 7  Independent   Grooming Assistance 7  Independent   Grooming Deficit (provide items r/t being out of familiar environment)   [address] [place] [address] 5  [address] [facility] 5  [facility]  5  [facility] [address]  5  [address]  7  Independent   Additional Comments assistance with lines and set up PRN   Bed Mobility   Rolling R 7  Independent   Rolling L 7  Independent   Supine to Sit 7  Independent   Sit to Supine 7  Independent   Transfers   Sit to Stand 7  Independent   Stand to Sit 7  Independent   Toilet transfer 7  Independent   Additional items (assistance with lines)   Balance   Static Sitting Normal   Dynamic Sitting Normal   Static Standing Good   Dynamic Standing Good   Activity Tolerance   Activity Tolerance Patient tolerated treatment well   RUE Assessment   RUE Assessment WFL   LUE Assessment   LUE Assessment WFL   Hand Function   Gross Motor Coordination Functional   Fine Motor Coordination Functional   Vision-Basic Assessment   Current Vision Other (Comment)  (pt states vision has declined)   Cognition   Overall Cognitive Status WFL   Arousal/Participation Alert; Cooperative   Attention Within functional limits   Orientation Level Oriented X4   Memory Within functional limits   Following Commands Follows all commands and directions without difficulty   Assessment   Limitation (pt essentially at baseline with ADL's and functional tansfer)   Prognosis Good   Assessment Pt is a 82 y o  female seen for OT evaluation s/p admit to [facility] on 12/28/2018 w/ Diverticulosis of intestine with bleeding  Comorbidities affecting pt's functional performance at time of assessment include: COPD and Asthma, Cardiac Disease  Rectal Bleed  Personal factors affecting pt at time of IE include:steps to enter environment  Prior to admission, pt was I with all self care ADL's and IADL's with the exception of driving r/t vision decrease;  I with functional transfers/toileting/mobility w/o NIKKI Tolentino Upon evaluation: Pt appears to be essentially at baseline with self care ADL's anf functional transfers  Active OT intervention does not appear to be indicated at this time      From OT standpoint, recommendation at time of d/c would be home     Plan   Treatment Interventions (eval only)   Goal Expiration Date 12/29/18   Treatment Day 0   OT Frequency Eval only   Recommendation   OT Discharge Recommendation Other (Comment)  (home I'ly)   OT - OK to Discharge Yes   Barthel Index   Feeding 10   Bathing 5   Grooming Score 5   Dressing Score 10   Bladder Score 10   Bowels Score 10   Toilet Use Score 10 Transfers (Bed/Chair) Score 10   Mobility (Level Surface) Score 0  (walked < 50 yards on eval, on O2 in room, on precautions)   Stairs Score 0  (DNT, see PT)   Barthel Index Score 79   Pao Stewart, OT

## 2018-12-29 NOTE — ASSESSMENT & PLAN NOTE
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

## 2018-12-29 NOTE — ASSESSMENT & PLAN NOTE
· Stool has been collected     · Continues to have watery diarrhea  · Will continue with IV Fluids  · Start imodium

## 2018-12-29 NOTE — PROGRESS NOTES
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  · She states that at 3:00 a m  This morning she had severe abdominal cramping and a large bowel movement that was dark maroon in color therefore she came to the ED  · Start a Protonix drip    · Patient has a history of history of complicated diverticulitis, 5 years status post sigmoid colon resection for colovesical fistula by Dr Filemon Cheatham at [facility]  Rectal bleed   Assessment & Plan    · Clear liquid diet no reds  · Consult surgery and GI  · Heme test all stools  · Intake and output  · Patient states that she had liquid diarrhea for 3 days straight approximately 7 days ago  · States that at that time she then started taking Imodium, and 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  · She states that at 3:00 a m   This morning she had severe abdominal cramping and a large bowel movement that was dark maroon in color therefore she came to the ED  · Start a Protonix drip     Cardiac disease   Assessment & Plan    · Stable and chronic       Asthma   Assessment & Plan    · Stable and chronic  · Continue with albuterol neb treatments  · Patient doesn't use inhalers  · Continue singulair     COPD (chronic obstructive pulmonary disease) ([facility])   Assessment & Plan    · Chronic and stable  · Not exacerbation     Functional diarrhea Assessment & Plan    · Stool has been collected  · Continues to have watery diarrhea  · Will continue with IV Fluids  · Start imodium          VTE Pharmacologic Prophylaxis: Pharmacologic: None at this time patient is in with GI bleed    Patient Centered Rounds: I have performed bedside rounds with nursing staff today  Discussions with Specialists or Other Care Team Provider:  Awaiting consultation with GI and surgery  Education and Discussions with Family / Patient:  Awaiting consultation with GI and surgery    Time Spent for Care: 30 minutes  More than 50% of total time spent on counseling and coordination of care as described above  Current Length of Stay: 1 day(s)    Current Patient Status: Inpatient   Certification Statement: The patient will continue to require additional inpatient hospital stay due to Awaiting consultation with GI and surgery for GI bleed    Discharge Plan:  Home when appropriate    Code Status: Level 1 - Full Code    Subjective:   Patient continues to deny any abdominal pain, but continues to have constant diarrhea  CDiff is pending  Objective:     Vitals:   Temp (24hrs), Av 7 °F (36 5 °C), Min:97 1 °F (36 2 °C), Max:98 5 °F (36 9 °C)    Temp:  [97 1 °F (36 2 °C)-98 5 °F (36 9 °C)] 97 1 °F (36 2 °C)  HR:  [76-89] 78  Resp:  [16-20] 19  BP: ()/(56-75) 106/62  SpO2:  [95 %-98 %] 97 %  Body mass index is 27 26 kg/m²  Input and Output Summary (last 24 hours): Intake/Output Summary (Last 24 hours) at 18 1614  Last data filed at 18   Gross per 24 hour   Intake              200 ml   Output                0 ml   Net              200 ml       Physical Exam:     Physical Exam   Constitutional: She is oriented to person, place, and time  Vital signs are normal  She appears well-developed and well-nourished  She is cooperative  HENT:   Head: Normocephalic and atraumatic     Nose: Nose normal    Mouth/Throat: Mucous membranes are normal    Eyes: Pupils are equal, round, and reactive to light  Conjunctivae and EOM are normal    Neck: Normal range of motion and full passive range of motion without pain  Neck supple  Cardiovascular: Normal rate, regular rhythm, normal heart sounds and normal pulses  Pulmonary/Chest: Effort normal and breath sounds normal    Abdominal: Normal appearance  Constant watery diarrhea, no blood on visual inspection  Musculoskeletal: Normal range of motion  Neurological: She is alert and oriented to person, place, and time  Skin: Skin is warm  Psychiatric: She has a normal mood and affect  Her speech is normal and behavior is normal        Additional Data:     Labs:      Results from last 7 days  Lab Units 12/29/18  1238 12/29/18  0456   WBC Thousand/uL  --  5 70   HEMOGLOBIN g/dL 10 3* 10 1*   HEMATOCRIT %  --  30 9*   PLATELETS Thousands/uL  --  224   NEUTROS PCT %  --  67   LYMPHS PCT %  --  18*   MONOS PCT %  --  10   EOS PCT %  --  4       Results from last 7 days  Lab Units 12/29/18  0456   POTASSIUM mmol/L 3 8   CHLORIDE mmol/L 108*   CO2 mmol/L 26   BUN mg/dL 13   CREATININE mg/dL 0 47*   CALCIUM mg/dL 8 3*   ALK PHOS U/L 80   ALT U/L 103*   AST U/L 42*       Results from last 7 days  Lab Units 12/29/18  0456   INR  1 05               * I Have Reviewed All Lab Data Listed Above  * Additional Pertinent Lab Tests Reviewed:  [facility] Admission  Reviewed    Imaging:  Imaging Reports Reviewed Today Include: none    Recent Cultures (last 7 days):           Last 24 Hours Medication List:     Current Facility-Administered Medications:  acetaminophen 650 mg Oral Q6H PRN LEEROY Vo    albuterol 2 5 mg Nebulization Q4H PRN LEEROY Vo    chlorhexidine 15 mL Swish & Spit Q12H [facility] & half-way LEEROY Vo    furosemide 20 mg Oral Early Morning LEEROY Vo    montelukast 10 mg Oral HS LEEROY Vo    ondansetron 4 mg Intravenous Q6H PRN LEEROY Elmore pantoprozole (PROTONIX) infusion (Continuous) 8 mg/hr Intravenous Continuous LEEROY Vo Last Rate: 8 mg/hr (12/29/18 5330)   potassium chloride 20 mEq Oral Daily LEEROY Vo    sodium chloride 50 mL/hr Intravenous Continuous Trip Ballard MD Last Rate: 50 mL/hr (12/29/18 8176)        Today, Patient Was Seen By: LEEROY Márquez    ** Please Note: Dictation voice to text software may have been used in the creation of this document   **

## 2018-12-29 NOTE — PHYSICAL THERAPY NOTE
Physical Therapy Evaluation     Patient's Name: Yajaira Mosher    Admitting Diagnosis  Rectal bleed [K62 5]    Problem List  Patient Active Problem List   Diagnosis    Rectal bleed    Asthma    COPD [facility]chronic obstructive pulmonary disease) ([facility] )    Cardiac disease    Diverticulosis of intestine with bleeding       Past Medical History  Past Medical History:   Diagnosis Date    Asthma     Cardiac disease     COPD (chronic obstructive pulmonary disease) ([facility] )        Past Surgical History  Past Surgical History:   Procedure Laterality Date    COLON SURGERY          12/29/18 1110   Note Type   Note type Eval only   Home Living   Type of Home Mobile home   Home Layout Stairs to enter with rails  (2 SAVANNAH)   Bathroom Shower/Tub Tub/shower unit   Bathroom Toilet Standard   Prior Function   Level of [place] Independent with ADLs and functional mobility   Lives With Alone   ADL Assistance Independent   IADLs Independent   Vocational Retired   Restrictions/Precautions   [facility] Bearing Precautions Per Order No   Other Precautions O2;Contact/isolation;Multiple lines   General   Family/Caregiver Present No   Cognition   Overall Cognitive Status WFL   Arousal/Participation Alert   Orientation Level Oriented X4   Memory Within functional limits   Following Commands Follows all commands and directions without difficulty   RLE Assessment   RLE Assessment WFL   LLE Assessment   LLE Assessment WFL   Coordination   Movements are Fluid and Coordinated 1   Sensation WFL   Bed Mobility   Rolling R 7  Independent   Rolling L 7  Independent   Supine to Sit 7  Independent   Sit to Supine 7  Independent   Transfers   Sit to Stand 7  Independent   Stand to Sit 7  Independent   Toilet transfer 7  Independent   Additional items Commode   Ambulation/Elevation   Gait pattern WNL   Gait Assistance 7  Independent   Assistive Device Rolling walker  (trialed RW at first but then didn't use)   Distance 30 ft   Additional items (distance limited by oxxygen tubing and precautions)   Balance   Static Sitting Normal   Dynamic Sitting Normal   Static Standing Good   Dynamic Standing Good   Endurance Deficit   Endurance Deficit No   Activity Tolerance   Activity Tolerance Patient tolerated treatment well   Assessment   Prognosis Excellent   Assessment Pt is 82 y o  female seen for PT evaluation s/p admit to [address] on 12/28/2018 w/ Diverticulosis of intestine with bleeding  PT consulted to assess pt's functional mobility and d/c needs  Order placed for PT eval and tx, w/ up as tolerated order  Comorbidities affecting pt's physical performance at time of assessment include: COPD  PTA, pt was ambulates community distances and elevations, has 2 SAVANNAH, lives alone in 1 level mobile home and retired  Personal factors affecting pt at time of IE include: stairs to enter home  Please find objective findings from PT assessment regarding body systems outlined above with impairments and limitations including none  The following objective measures performed on IE also reveal limitations: Barthel Index: 100/100  Pt's clinical presentation is currently stable  seen in pt's presentation of lack of deficits  Pt to benefit from continued PT tx to address deficits as defined above and maximize level of functional independent mobility and consistency  From PT/mobility standpoint, recommendation at time of d/c would be anticipate no needs pending progress in order to facilitate return to PLOF     Goals   Patient Goals to go home   Treatment Day 0   Recommendation   Recommendation D/C PT   PT - OK to Discharge Yes   Barthel Index   Feeding 10   Bathing 5   Grooming Score 5   Dressing Score 10   Bladder Score 10   Bowels Score 10   Toilet Use Score 10   Transfers (Bed/Chair) Score 15   Mobility (Level Surface) Score 15   Stairs Score 10   Barthel Index Score 100           Mildred Conklin, PT

## 2018-12-29 NOTE — UTILIZATION REVIEW
Initial Clinical Review    Admission: Date/Time/Statement: 12/28/18 @ 1829     Orders Placed This Encounter   Procedures    Inpatient Admission     Standing Status:   Standing     Number of Occurrences:   1     Order Specific Question:   Admitting Physician     Answer:   Roseanne Stubbs [[id number]]     Order Specific Question:   Level of Care     Answer:   Med Surg [16]     Order Specific Question:   Estimated length of stay     Answer:   More than 2 Midnights     Order Specific Question:   Certification     Answer:   I certify that inpatient services are medically necessary for this patient for a duration of greater than two midnights  See H&P and MD Progress Notes for additional information about the patient's course of treatment  PATIENT TRANSFERRED FROM [address] ER     Chief Complaint: RECTAL BLEEDING    History of Illness: PATIENT WAKE UP THIS AM WITH ABDOMINAL PAIN AND BRIGHT RED RECTAL BLEEDING    ED Vital Signs:   ED Triage Vitals   Temperature Pulse Respirations Blood Pressure SpO2   12/28/18 1830 12/28/18 1830 12/28/18 1830 12/28/18 1830 12/28/18 1830   98 5 °F (36 9 °C) 87 18 104/62 97 %      Temp Source Heart Rate Source Patient Position - Orthostatic VS BP Location FiO2 (%)   12/28/18 1830 12/28/18 1830 12/28/18 2319 12/28/18 1830 --   Tympanic Monitor Sitting Right arm       Pain Score       12/28/18 2001       No Pain        Wt Readings from Last 1 Encounters:   12/28/18 67 6 kg (149 lb 0 5 oz)       CT of her abdomen showed pain clinic diverticulosis with an abdominal hernia    [facility]   sodium chloride 0 9 % infusion (125 mL/hr Intravenous New Bag 12/28/18 1323)   pantoprazole (PROTONIX) 80 mg in sodium chloride 0 9 % 100 mL infusion (8 mg/hr Intravenous New Bag 12/28/18 1402)   pantoprazole (PROTONIX) 80 mg in sodium chloride 0 9 % 100 mL IVPB (0 mg Intravenous Stopped 12/28/18 1401)          Past Medical/Surgical History:    Active Ambulatory Problems     Diagnosis Date Noted    No Active Ambulatory Problems     Resolved Ambulatory Problems     Diagnosis Date Noted    No Resolved Ambulatory Problems     Past Medical History:   Diagnosis Date    Asthma     Cardiac disease     [facility]ch[facility] obstructive [facility] ([facility])        Admitting Diagnosis: Rectal bleed [K62 5]    Age/Sex: 82 y o  female    Assessment/Plan: 83 YO FEMALE PRESENT WITH BRIGHT RED RECTAL BLEEDING    Admission Orders:  Scheduled Meds:   Current Facility-Administered Medications:  acetaminophen 650 mg Oral Q6H PRN Emilee A Meckes, CRNP    albuterol 2 5 mg Nebulization Q4H PRN Emilee A Meckes, CRNP    chlorhexidine 15 mL Swish & Spit Q12H [address] Emilee A Meckes, CRNP    furosemide 20 mg Oral Early Morning Emilee A Meckes, CRNP    montelukast 10 mg Oral HS Emilee A Meckes, CRNP    ondansetron 4 mg Intravenous Q6H PRN Emilee A Meckes, CRNP    pantoprozole (PROTONIX) infusion (Continuous) 8 mg/hr Intravenous Continuous Emilee A Meckes, CRNP Last Rate: 8 mg/hr (12/29/18 0953)   potassium chloride 20 mEq Oral Daily Emilee A Meckes, CRNP      Continuous Infusions:   pantoprozole (PROTONIX) infusion (Continuous) 8 mg/hr Last Rate: 8 mg/hr (12/29/18 0953)     (+) ROBERTO RED BLOOD IN BM IN ER  H/H  10 7/32 5   10 1/30 9  (+) OCCULT FECAL BLOOD  CLEAR  FALL PRECAUTIONS  CONSULT GI DIVERTICULOSIS OF INTESTINE WITH BLEEDING  COMPRESSION DEVICE  CONSULT ACUTE CARE SURGERY  PT/OT EVAL AND TREAT

## 2018-12-29 NOTE — PLAN OF CARE
Problem: DISCHARGE PLANNING - CARE MANAGEMENT  Goal: Discharge to post-acute care or home with appropriate resources  INTERVENTIONS:  - Conduct assessment to determine patient/family and health care team treatment goals, and need for post-acute services based on payer coverage, community resources, and patient preferences, and barriers to discharge  - Address psychosocial, clinical, and financial barriers to discharge as identified in assessment in conjunction with the patient/family and health care team  - Arrange appropriate level of post-acute services according to patient's   needs and preference and payer coverage in collaboration with the physician and health care team  - Communicate with and update the patient/family, physician, and health care team regarding progress on the discharge plan  - Arrange appropriate transportation to post-acute venues  Discharge home without any services  Friend will transport home    Outcome: Progressing

## 2018-12-29 NOTE — ASSESSMENT & PLAN NOTE
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

## 2018-12-29 NOTE — CONSULTS
Consultation - General Surgery   Boris Marie 82 y o  female MRN: [id number]  Unit/Bed#: -02 Encounter: [id number]    Assessment/Plan     Assessment:  The patient is a pleasant 29-year-old white female with a history of complicated diverticulitis, 5 years status post sigmoid colon resection for colovesical fistula by Dr Julia Horner at [place]  The patient reports diarrhea for the last week, painless voluminous liquid stool, apparently intractable diarrhea unresponsive to self-medication with immodium  She reports having painless diarrhea accompanied by fresh blood on Friday evening  She is currently admitted and the diarrhea continues, although she has not seen any blood on the toilet paper for the last few BM's  Stool for C diff is pending  She denies any abdominal pain  Denies any nausea or vomiting  Her last endoscopy was 5 years ago prior to her colon surgery  Plan:  Saline IV for hydration  Stool for bacterial pathogens, ova and parasites  Await Stool results prior to beginning antibiotics  Follow up colonoscopy as outpatient if no further bleeding  History of Present Illness   History, ROS and PFSH obtained from the patient, who is a reliable historian  HPI:  Boris Marie is a 82 y o  female who presents with one week history of diarrhea, and subsequent painless rectal bleeding, which has been self limited  She does have a history of complicated diverticulitis and is 5 yrs S/P sigmoid colon resection  She also has a broad based asymptomatic incisional hernia of the midline incision  Inpatient consult to Acute Care Surgery  Consult performed by: Yael Espinal ordered by: Karla Varela A          Review of Systems   Constitutional: Positive for fatigue  Negative for chills, fever and unexpected weight change  Respiratory:        Oxygen dependent COPD, 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  Cardiovascular: Negative for chest pain and palpitations  Gastrointestinal: Positive for anal bleeding and diarrhea  Negative for abdominal pain, constipation, nausea, rectal pain and vomiting  Genitourinary: Negative  All other systems reviewed and are negative  Historical Information   Past Medical History:   Diagnosis Date    Asthma     Cardiac disease     COPD (chronic obstructive pulmonary disease) ([facility] [address] )      Past Surgical History:   Procedure Laterality Date    COLON SURGERY       Social History   History   Alcohol Use No     History   Drug Use No     History   Smoking Status    Former Smoker    Quit date: 11/27/2013   Smokeless Tobacco    Never Used     Family History: History reviewed  No pertinent family history  Meds/Allergies   all current active meds have been reviewed and PTA meds:   Prior to Admission Medications   Prescriptions Last Dose Informant Patient Reported? Taking?   fluticasone-umeclidinium-vilanterol (TRELEGY ELLIPTA) 100-62 5-25 MCG/INH inhaler 12/27/2018 at Unknown time Self Yes Yes   Sig: Inhale 1 puff daily Rinse mouth after use     furosemide (LASIX) 20 mg tablet 12/27/2018 at Unknown time Self Yes Yes   Sig: Take 20 mg by mouth daily   montelukast (SINGULAIR) 10 mg tablet 12/27/2018 at Unknown time Self Yes Yes   Sig: Take 10 mg by mouth daily at bedtime   potassium chloride (MICRO-K) 10 MEQ CR capsule 12/27/2018 at Unknown time Self Yes Yes   Sig: Take 10 mEq by mouth daily      Facility-Administered Medications: None     No Known Allergies    Objective   First Vitals:   Blood Pressure: 104/62 (12/28/18 1830)  Pulse: 87 (12/28/18 1830)  Temperature: 98 5 °F (36 9 °C) (12/28/18 1830)  Temp Source: Tympanic (12/28/18 1830)  Respirations: 18 (12/28/18 1830)  Height: 5' 2" (157 5 cm) (12/28/18 2001)  Weight - Scale: 67 6 kg (149 lb 0 5 oz) (12/28/18 2001)  SpO2: 97 % (12/28/18 1830)    Current Vitals:   Blood Pressure: 106/62 (12/29/18 1441)  Pulse: 78 (12/29/18 1441)  Temperature: (!) 97 1 °F (36 2 °C) (12/29/18 1441)  Temp Source: Tympanic (12/29/18 1441)  Respirations: 19 (12/29/18 1441)  Height: 5' 2" (157 5 cm) (12/28/18 2001)  Weight - Scale: 67 6 kg (149 lb 0 5 oz) (12/28/18 2001)  SpO2: 97 % (12/29/18 1441)      Intake/Output Summary (Last 24 hours) at 12/29/18 1449  Last data filed at 12/28/18 2001   Gross per 24 hour   Intake              200 ml   Output                0 ml   Net              200 ml       Invasive Devices     Peripheral Intravenous Line            Peripheral IV 12/28/18 Right Hand 1 day                Physical Exam   Constitutional: She is oriented to person, place, and time  She appears well-developed and well-nourished  HENT:   Head: Normocephalic and atraumatic  Eyes: Pupils are equal, round, and reactive to light  No scleral icterus  Neck: Neck supple  No tracheal deviation present  Thyromegaly present  Cardiovascular: Normal rate, regular rhythm and normal heart sounds  No murmur heard  Pulmonary/Chest: No respiratory distress  She has no wheezes  She has no rales  Distant Breath sounds bilaterlly   Abdominal: Soft  Bowel sounds are normal  She exhibits no distension and no mass  There is no tenderness  There is no guarding  Genitourinary:   Genitourinary Comments: Patient sitting in chair, exam deferred   Musculoskeletal: She exhibits no edema or deformity  Neurological: She is alert and oriented to person, place, and time  Hard of hearing   Skin: Skin is warm and dry  Psychiatric: She has a normal mood and affect  Lab Results:   I have personally reviewed pertinent lab results    , CBC:   Lab Results   Component Value Date    WBC 5 70 12/29/2018    HGB 10 3 (L) 12/29/2018    HCT 30 9 (L) 12/29/2018    MCV 85 12/29/2018     12/29/2018    MCH 27 8 12/29/2018    MCHC 32 7 12/29/2018    RDW 14 4 12/29/2018    MPV 8 2 (L) 12/29/2018    NRBC 0 12/29/2018   , CMP:   Lab Results   Component Value Date    SODIUM 140 12/29/2018    K 3 8 12/29/2018     (H) 12/29/2018    CO2 26 12/29/2018    BUN 13 12/29/2018    CREATININE 0 47 (L) 12/29/2018    CALCIUM 8 3 (L) 12/29/2018    AST 42 (H) 12/29/2018     (H) 12/29/2018    ALKPHOS 80 12/29/2018    EGFR 93 12/29/2018   , Coagulation:   Lab Results   Component Value Date    INR 1 05 12/29/2018     Imaging: I have personally reviewed pertinent reports  and I have personally reviewed pertinent films in PACS  EKG, Pathology, and Other Studies: I have personally reviewed pertinent reports  Counseling / Coordination of Care  Total floor / unit time spent today 30 minutes  Greater than 50% of total time was spent with the patient and / or family counseling and / or coordination of care  A description of the counseling / coordination of care: Including need for follow up colonoscopy

## 2018-12-29 NOTE — H&P
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  · She states that at 3:00 a m  This morning she had severe abdominal cramping and a large bowel movement that was dark maroon in color therefore she came to the ED  · Start a Protonix drip     Rectal bleed   Assessment & Plan    · Clear liquid diet no reds  · Consult surgery and GI  · Heme test all stools  · Intake and output  · Patient states that she had liquid diarrhea for 3 days straight approximately 7 days ago  · States that at that time she then started taking Imodium, and 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  · She states that at 3:00 a m   This morning she had severe abdominal cramping and a large bowel movement that was dark maroon in color therefore she came to the ED  · Start a Protonix drip     Cardiac disease   Assessment & Plan    · Stable and chronic       Asthma   Assessment & Plan    · Stable and chronic  · Continue with albuterol neb treatments  · Patient doesn't use inhalers  · Continue singulair     COPD (chronic obstructive pulmonary disease) ([facility])   Assessment & Plan    · Chronic and stable  · Not exacerbation       VTE Prophylaxis: None patient in which GI bleed  Code Status:  Full  POLST: POLST is not applicable to this patient  Discussion with family:  Need for Protonix drip    Anticipated Length of Stay: Patient will be admitted on an Inpatient basis with an anticipated length of stay of  > 2 midnights  Justification for Hospital Stay:  Need for GI and surgical consultation    Total Time for Visit, including Counseling / Coordination of Care: 70   Greater than 50% of this total time spent on direct patient counseling and coordination of care  Chief Complaint:   GI bleed    History of Present Illness:    Casandra Herbert is a 82 y o  female who presents with GI bleed      Patient states approximately 1 week ago she had liquid stools for 3 days  She took Kaopectate, and then started taking Imodium  She took Imodium for approximately 5 days without any change in her stools  She did state that towards the 5th day her stool did go from a liquid state towards a more solid soft stool, however 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       At 3:00 a m  Today she woke up with abdominal pain and went to the bathroom and had a large maroon bowel movement with bright red blood  She came [facility] the emergency department at the [facility]       She states that she has not had anything to eat or drink since 10:00 p m  On 12/27/2018, and that she has only had 1 small maroon bowel movement in the emergency department  We will start her on a Protonix drip, her CT of her abdomen showed pain clinic diverticulosis with an abdominal hernia  Patient has been unaware of any abdominal hernia        Review of Systems:  Review of Systems    Past Medical and Surgical History:   Past Medical History:   Diagnosis Date    Asthma     Cardiac disease     COPD (chronic obstructive pulmonary disease) ([facility] [address] )        Past Surgical History:   Procedure Laterality Date    COLON SURGERY         Meds/Allergies:  Prior to Admission medications    Medication Sig Start Date End Date Taking?  Authorizing Provider   fluticasone-umeclidinium-vilanterol (Beth Keith) 100-62 5-25 MCG/INH inhaler Inhale 1 puff daily Rinse mouth after use  Yes Historical Provider, MD   furosemide (LASIX) 20 mg tablet Take 20 mg by mouth daily   Yes Historical Provider, MD   montelukast (SINGULAIR) 10 mg tablet Take 10 mg by mouth daily at bedtime   Yes Historical Provider, MD   potassium chloride (MICRO-K) 10 MEQ CR capsule Take 10 mEq by mouth daily   Yes Historical Provider, MD   cyclobenzaprine (FLEXERIL) 10 mg tablet Take 1 tablet (10 mg total) by mouth 3 (three) times a day for 5 days 11/27/18 12/28/18  Oxana Vidal MD   docusate sodium (COLACE) 250 MG capsule Take 1 capsule (250 mg total) by mouth daily 11/27/18 12/28/18  Tung Varela MD   ketorolac (TORADOL) 10 mg tablet Take 1 tablet (10 mg total) by mouth 3 (three) times a day for 5 days 11/27/18 12/28/18  Oxana Vidal MD     I have reviewed home medications with patient personally  Allergies: No Known Allergies    Social History:  Marital Status:    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 [employer]  Patient Pre-hospital Living Situation:  At home  Patient Pre-hospital Level of Mobility:  Full  Patient Pre-hospital Diet Restrictions:  None  Substance Use History:     History   Alcohol Use No     History   Smoking Status    Former Smoker    Quit date: 11/27/2013   Smokeless Tobacco    Never Used     History   Drug Use No       Family History:  I have reviewed the patients family history    Physical Exam:   Vitals:   Blood Pressure: 104/62 (12/28/18 1830)  Pulse: 87 (12/28/18 1830)  Temperature: 98 5 °F (36 9 °C) (12/28/18 1830)  Temp Source: Tympanic (12/28/18 1830)  Respirations: 18 (12/28/18 1830)  SpO2: 97 % (12/28/18 1830)    Physical Exam    Additional Data:   Lab Results: I have personally reviewed pertinent reports          Results from last 7 days  Lab Units 12/28/18  1300   WBC Thousand/uL 5 90   HEMOGLOBIN g/dL 10 7*   HEMATOCRIT % 32 5*   PLATELETS Thousands/uL 239   NEUTROS PCT % 63   LYMPHS PCT % 22   MONOS PCT % 11 EOS PCT % 3       Results from last 7 days  Lab Units 12/28/18  1300   POTASSIUM mmol/L 4 1   CHLORIDE mmol/L 102   CO2 mmol/L 28   BUN mg/dL 22   CREATININE mg/dL 0 50*   CALCIUM mg/dL 8 3*   ALK PHOS U/L 85   ALT U/L 134*   AST U/L 64*       Results from last 7 days  Lab Units 12/28/18  1300   INR  1 11               Imaging: I have personally reviewed pertinent reports  No orders to display       EKG, Pathology, and Other Studies Reviewed on Admission:   · EKG:     [facility]     ** Please Note: This note has been constructed using a voice recognition system   **

## 2018-12-30 LAB
ALBUMIN SERPL BCP-MCNC: 2.9 G/DL (ref 3.5–5.7)
ALP SERPL-CCNC: 73 U/L (ref 55–165)
ALT SERPL W P-5'-P-CCNC: 77 U/L (ref 7–52)
ANION GAP SERPL CALCULATED.3IONS-SCNC: 3 MMOL/L (ref 4–13)
AST SERPL W P-5'-P-CCNC: 29 U/L (ref 13–39)
BASOPHILS # BLD AUTO: 0 THOUSANDS/ΜL (ref 0–0.1)
BASOPHILS NFR BLD AUTO: 1 % (ref 0–2)
BILIRUB SERPL-MCNC: 0.3 MG/DL (ref 0.2–1)
BUN SERPL-MCNC: 8 MG/DL (ref 7–25)
C DIFF TOX GENS STL QL NAA+PROBE: NORMAL
CALCIUM SERPL-MCNC: 8.3 MG/DL (ref 8.6–10.5)
CAMPYLOBACTER DNA SPEC NAA+PROBE: NORMAL
CHLORIDE SERPL-SCNC: 110 MMOL/L (ref 98–107)
CO2 SERPL-SCNC: 27 MMOL/L (ref 21–31)
CREAT SERPL-MCNC: 0.49 MG/DL (ref 0.6–1.2)
EOSINOPHIL # BLD AUTO: 0.2 THOUSAND/ΜL (ref 0–0.61)
EOSINOPHIL NFR BLD AUTO: 5 % (ref 0–5)
ERYTHROCYTE [DISTWIDTH] IN BLOOD BY AUTOMATED COUNT: 14.5 % (ref 11.5–14.5)
GFR SERPL CREATININE-BSD FRML MDRD: 92 ML/MIN/1.73SQ M
GLUCOSE SERPL-MCNC: 109 MG/DL (ref 65–99)
HCT VFR BLD AUTO: 29.8 % (ref 34.8–46.1)
HGB BLD-MCNC: 10 G/DL (ref 12–16)
HGB BLD-MCNC: 11.1 G/DL (ref 12–16)
LYMPHOCYTES # BLD AUTO: 0.9 THOUSANDS/ΜL (ref 0.6–4.47)
LYMPHOCYTES NFR BLD AUTO: 19 % (ref 21–51)
MCH RBC QN AUTO: 28.1 PG (ref 26–34)
MCHC RBC AUTO-ENTMCNC: 33.6 G/DL (ref 31–37)
MCV RBC AUTO: 84 FL (ref 81–99)
MONOCYTES # BLD AUTO: 0.5 THOUSAND/ΜL (ref 0.17–1.22)
MONOCYTES NFR BLD AUTO: 11 % (ref 2–12)
NEUTROPHILS # BLD AUTO: 3.1 THOUSANDS/ΜL (ref 1.4–6.5)
NEUTS SEG NFR BLD AUTO: 64 % (ref 42–75)
NRBC BLD AUTO-RTO: 0 /100 WBCS
PLATELET # BLD AUTO: 192 THOUSANDS/UL (ref 149–390)
PMV BLD AUTO: 7.4 FL (ref 8.6–11.7)
POTASSIUM SERPL-SCNC: 3.7 MMOL/L (ref 3.5–5.5)
PROT SERPL-MCNC: 5.2 G/DL (ref 6.4–8.9)
RBC # BLD AUTO: 3.58 MILLION/UL (ref 3.9–5.2)
SALMONELLA DNA SPEC QL NAA+PROBE: NORMAL
SHIGA TOXIN STX GENE SPEC NAA+PROBE: NORMAL
SHIGELLA DNA SPEC QL NAA+PROBE: NORMAL
SODIUM SERPL-SCNC: 140 MMOL/L (ref 134–143)
WBC # BLD AUTO: 4.8 THOUSAND/UL (ref 4.8–10.8)

## 2018-12-30 PROCEDURE — 99231 SBSQ HOSP IP/OBS SF/LOW 25: CPT | Performed by: SPECIALIST

## 2018-12-30 PROCEDURE — 80053 COMPREHEN METABOLIC PANEL: CPT | Performed by: NURSE PRACTITIONER

## 2018-12-30 PROCEDURE — 85018 HEMOGLOBIN: CPT | Performed by: NURSE PRACTITIONER

## 2018-12-30 PROCEDURE — 94760 N-INVAS EAR/PLS OXIMETRY 1: CPT

## 2018-12-30 PROCEDURE — 85025 COMPLETE CBC W/AUTO DIFF WBC: CPT | Performed by: NURSE PRACTITIONER

## 2018-12-30 PROCEDURE — 99232 SBSQ HOSP IP/OBS MODERATE 35: CPT | Performed by: NURSE PRACTITIONER

## 2018-12-30 PROCEDURE — C9113 INJ PANTOPRAZOLE SODIUM, VIA: HCPCS | Performed by: NURSE PRACTITIONER

## 2018-12-30 PROCEDURE — 94640 AIRWAY INHALATION TREATMENT: CPT

## 2018-12-30 RX ORDER — FUROSEMIDE 20 MG/1
20 TABLET ORAL ONCE
Status: DISCONTINUED | OUTPATIENT
Start: 2018-12-30 | End: 2018-12-30

## 2018-12-30 RX ADMIN — SODIUM CHLORIDE 50 ML/HR: 9 INJECTION, SOLUTION INTRAVENOUS at 12:32

## 2018-12-30 RX ADMIN — SODIUM CHLORIDE 8 MG/HR: 9 INJECTION, SOLUTION INTRAVENOUS at 12:49

## 2018-12-30 RX ADMIN — POTASSIUM CHLORIDE 20 MEQ: 1500 TABLET, EXTENDED RELEASE ORAL at 09:53

## 2018-12-30 RX ADMIN — FUROSEMIDE 20 MG: 20 TABLET ORAL at 05:10

## 2018-12-30 RX ADMIN — MONTELUKAST SODIUM 10 MG: 10 TABLET, COATED ORAL at 23:23

## 2018-12-30 RX ADMIN — CHLORHEXIDINE GLUCONATE 0.12% ORAL RINSE 15 ML: 1.2 LIQUID ORAL at 09:53

## 2018-12-30 RX ADMIN — ALBUTEROL SULFATE 2.5 MG: 2.5 SOLUTION RESPIRATORY (INHALATION) at 22:31

## 2018-12-30 RX ADMIN — CHLORHEXIDINE GLUCONATE 0.12% ORAL RINSE 15 ML: 1.2 LIQUID ORAL at 23:23

## 2018-12-30 RX ADMIN — ACETAMINOPHEN 650 MG: 325 TABLET ORAL at 01:22

## 2018-12-30 NOTE — ASSESSMENT & PLAN NOTE
· Patient had been advanced on 12/29/2018 to a full liquid diet, we will advance her diet on 12/30/2018 to a regular  · Both surgery and GI have been consulted  · Heme test of all liquid stools   · Secondary to a liquid diet patient continues to have liquid bowel movements  · Patient states that overnight she had no bowel movements, as soon as she had her liquid breakfast it started back up again  · At this point we will order her regular tray for lunch and monitor her diarrhea  ·   ·   · Patient states that she had liquid diarrhea for 3 days straight approximately 7 days ago  · States that at that time she then started taking Imodium, and 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

## 2018-12-30 NOTE — ASSESSMENT & PLAN NOTE
· No further rectal bleeding on 12/30/2018  ·   · Patient had been advanced on 12/29/2018 to a full liquid diet, we will advance her diet on 12/30/2018 to a regular  · Both surgery and GI have been consulted  · Heme test of all liquid stools   · Secondary to a liquid diet patient continues to have liquid bowel movements  · Patient states that overnight she had no bowel movements, as soon as she had her liquid breakfast it started back up again  · At this point we will order her regular tray for lunch and monitor her diarrhea  ·   ·   · Patient states that she had liquid diarrhea for 3 days straight approximately 7 days ago  · States that at that time she then started taking Imodium, and 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

## 2018-12-30 NOTE — PHYSICIAN ADVISOR
Current patient class: Inpatient  The patient is currently on Hospital Day: 2 at [address]      The patient was admitted to the hospital at [place] [address] on 12/28/18 for the following diagnosis:  Rectal bleed [K62 5]       There is documentation in the medical record of an expected length of stay of at least 2 midnights  The patient is therefore expected to satisfy the 2 midnight benchmark and given the 2 midnight presumption is appropriate for INPATIENT ADMISSION  Given this expectation of a satisfying stay, CMS instructs us that the patient is most often appropriate for inpatient admission under part A provided medical necessity is documented in the chart  After review of the relevant documentation, labs, vital signs and test results, the patient is appropriate for INPATIENT ADMISSION  Admission to the hospital as an inpatient is a complex decision making process which requires the practitioner to consider the patients presenting complaint, history and physical examination and all relevant testing  With this in mind, in this case, the patient was deemed appropriate for INPATIENT ADMISSION  After review of the documentation and testing available at the time of the admission I concur with this clinical determination of medical necessity  Rationale is as follows: The patient is a 82 yrs old Female who presented to the ED at 12/28/2018  6:29 PM with a chief complaint of complicate diverticulitis  Given the need for further hospitalization, and along with the documentation of medical necessity present in the chart, the patient is appropriate for inpatient admission  The patient is expected to satisfy the 2 midnight benchmark, and will require further acute medical care  The patient does have comorbid conditions which increases the risk for significant adverse outcome  Given this the patient is appropriate for inpatient admission         Patient will continue to remain hospitalized for IV fluids, cultures  Patient will require further monitoring, and is expected to remain hospitalized for at least a second midnight  As such this patient will satisfy the 2 midnights benchmark  The patient therefore is appropriate for inpatient admission      The patients vitals on arrival were ED Triage Vitals   Temperature Pulse Respirations Blood Pressure SpO2   12/28/18 1830 12/28/18 1830 12/28/18 1830 12/28/18 1830 12/28/18 1830   98 5 °F (36 9 °C) 87 18 104/62 97 %      Temp Source Heart Rate Source Patient Position - Orthostatic VS BP Location FiO2 (%)   12/28/18 1830 12/28/18 1830 12/28/18 2319 12/28/18 1830 --   Tympanic Monitor Sitting Right arm       Pain Score       12/28/18 2001       No Pain           Past Medical History:   Diagnosis Date    Asthma     Cardiac disease     COPD (chronic obstructive pulmonary disease) ([facility] [address] )      Past Surgical History:   Procedure Laterality Date    COLON SURGERY             Consults have been placed to:   IP CONSULT TO CASE MANAGEMENT  IP CONSULT TO ACUTE CARE SURGERY  IP CONSULT TO GASTROENTEROLOGY    Vitals:    12/29/18 0728 12/29/18 1010 12/29/18 1441 12/29/18 1727   BP: 96/56  106/62    BP Location: Right arm  Left arm    Pulse: 76  78    Resp: 19  19    Temp: 97 7 °F (36 5 °C)  (!) 97 1 °F (36 2 °C)    TempSrc: Tympanic  Tympanic    SpO2: 97% 98% 97% 98%   Weight:       Height:           Most recent labs:    Recent Labs      12/28/18   1300  12/29/18   0456   12/29/18   1651   WBC  5 90  5 70   --    --    HGB  10 7*  10 1*   < >  10 2*   HCT  32 5*  30 9*   --    --    PLT  239  224   --    --    K  4 1  3 8   --    --    CALCIUM  8 3*  8 3*   --    --    BUN  22  13   --    --    CREATININE  0 50*  0 47*   --    --    LIPASE  10*   --    --    --    INR  1 11  1 05   --    --    TROPONINI  <0 03   --    --    --    AST  64*  42*   --    --    ALT  134*  103*   --    --    ALKPHOS  85  80   --    --     < > = values in this interval not displayed         Scheduled Meds:  Current Facility-Administered Medications:  acetaminophen 650 mg Oral Q6H PRN Emilee Lorenzo, MARILEENP    albuterol 2 5 mg Nebulization Q4H PRN Emilee Lorenzo, CRNP    chlorhexidine 15 mL Swish & Spit Q12H [facility] Emilee Lorenzo, CRNP    furosemide 20 mg Oral Early Morning Emilee Lorenzo, CRNP    loperamide 2 mg Oral Q2H PRN Emilee Lorenzo, CRNP    montelukast 10 mg Oral HS Emilee Lorenzo, CRNP    ondansetron 4 mg Intravenous Q6H PRN Emilee Lorenzo, CRNP    pantoprozole (PROTONIX) infusion (Continuous) 8 mg/hr Intravenous Continuous LEEROY Vo Last Rate: 8 mg/hr (12/29/18 1750)   potassium chloride 20 mEq Oral Daily LEEROY Vo    sodium chloride 50 mL/hr Intravenous Continuous Laura Siddiqi MD Last Rate: 50 mL/hr (12/29/18 1547)     Continuous Infusions:  pantoprozole (PROTONIX) infusion (Continuous) 8 mg/hr Last Rate: 8 mg/hr (12/29/18 1750)   sodium chloride 50 mL/hr Last Rate: 50 mL/hr (12/29/18 1547)     PRN Meds:   acetaminophen    albuterol    loperamide    ondansetron    Surgical procedures (if appropriate):

## 2018-12-30 NOTE — ASSESSMENT & PLAN NOTE
· Stool has been collected     · Continues to have watery diarrhea  · Will continue with IV Fluids  · Start imodium  · Starting patient on regular diet

## 2018-12-30 NOTE — PROGRESS NOTES
Progress Note - Juventino Turner 1935, 82 y o  female MRN: [id number]    Unit/Bed#: -02 Encounter: [id number]    Primary Care Provider: Aurora Rebolledo DO   Date and time admitted to hospital: 12/28/2018  6:29 PM        * Diverticulosis of intestine with bleeding   Assessment & Plan    · Patient had been advanced on 12/29/2018 to a full liquid diet, we will advance her diet on 12/30/2018 to a regular  · Both surgery and GI have been consulted  · Heme test of all liquid stools   · Secondary to a liquid diet patient continues to have liquid bowel movements  · Patient states that overnight she had no bowel movements, as soon as she had her liquid breakfast it started back up again  · At this point we will order her regular tray for lunch and monitor her diarrhea  ·   ·   · Patient states that she had liquid diarrhea for 3 days straight approximately 7 days ago  · States that at that time she then started taking Imodium, and 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  · She states that at 3:00 a m  on day of admission she had severe abdominal cramping and a large bowel movement that was dark maroon in color therefore she came to the ED  · Start a Protonix drip - was discontinued by surgery    · Patient has a history of history of complicated diverticulitis, 5 years status post sigmoid colon resection for colovesical fistula by Dr Mariam Fried at [facility]  Rectal bleed   Assessment & Plan    · No further rectal bleeding on 12/30/2018  ·   · Patient had been advanced on 12/29/2018 to a full liquid diet, we will advance her diet on 12/30/2018 to a regular  · Both surgery and GI have been consulted  · Heme test of all liquid stools   · Secondary to a liquid diet patient continues to have liquid bowel movements  · Patient states that overnight she had no bowel movements, as soon as she had her liquid breakfast it started back up again    · At this point we will order her regular tray for lunch and monitor her diarrhea  ·   ·   · Patient states that she had liquid diarrhea for 3 days straight approximately 7 days ago  · States that at that time she then started taking Imodium, and 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  · She states that at 3:00 a m  on day of admission she had severe abdominal cramping and a large bowel movement that was dark maroon in color therefore she came to the ED  · Start a Protonix drip - was discontinued by surgery    · Patient has a history of history of complicated diverticulitis, 5 years status post sigmoid colon resection for colovesical fistula by Dr Mahad Valentin at [address]  ·      Cardiac disease   Assessment & Plan    · Stable and chronic       Asthma   Assessment & Plan    · Stable and chronic  · Continue with albuterol neb treatments  · Patient doesn't use inhalers  · Continue singulair     COPD (chronic obstructive pulmonary disease) ([facility])   Assessment & Plan    · Chronic and stable  · Not exacerbation     Functional diarrhea   Assessment & Plan    · Stool has been collected  · Continues to have watery diarrhea  · Will continue with IV Fluids  · Start imodium  · Starting patient on regular diet            VTE Pharmacologic Prophylaxis: Pharmacologic: None at this time patient is in with a GI bleed    Patient Centered Rounds: I have performed bedside rounds with nursing staff today  Discussions with Specialists or Other Care Team Provider:  Patient still remains with liquid bowel movements  Education and Discussions with Family / Patient:  Advancement of her diet to regular diet and monitoring of her bowel movement    Time Spent for Care: 30 minutes  More than 50% of total time spent on counseling and coordination of care as described above      Current Length of Stay: 2 day(s)    Current Patient Status: Inpatient   Certification Statement: The patient will continue to require additional inpatient hospital stay due to Continuation of liquid bowel movement with requiring IV fluids  Discharge Plan:  Home when appropriate    Code Status: Level 1 - Full Code    Subjective:   Patient sitting out of bed in her chair  She states that she had no bowel movement over night and this morning prior to breakfast when she woke up she had a soft bowel movement this was confirmed with nursing staff  The patient states that after she ate her liquid breakfast she immediately had 3 liquid bowel movements  At this point we will increase her diet to a regular diet and monitor her stools  Objective:     Vitals:   Temp (24hrs), Av 8 °F (36 °C), Min:96 2 °F (35 7 °C), Max:97 2 °F (36 2 °C)    Temp:  [96 2 °F (35 7 °C)-97 2 °F (36 2 °C)] 96 2 °F (35 7 °C)  HR:  [70-78] 75  Resp:  [18-21] 21  BP: ()/(62-70) 120/64  SpO2:  [94 %-98 %] 94 %  Body mass index is 27 26 kg/m²  Input and Output Summary (last 24 hours): Intake/Output Summary (Last 24 hours) at 18 1214  Last data filed at 18 0606   Gross per 24 hour   Intake              360 ml   Output              600 ml   Net             -240 ml       Physical Exam:     Physical Exam   Constitutional: She is oriented to person, place, and time  Vital signs are normal  She appears well-developed and well-nourished  She is cooperative  HENT:   Head: Normocephalic and atraumatic  Nose: Nose normal    Mouth/Throat: Mucous membranes are normal    Eyes: Pupils are equal, round, and reactive to light  Conjunctivae and EOM are normal    Neck: Normal range of motion and full passive range of motion without pain  Neck supple  Cardiovascular: Normal rate, regular rhythm, normal heart sounds and normal pulses  Pulmonary/Chest: Effort normal and breath sounds normal    Abdominal: Soft   Normal appearance and bowel sounds are normal    Continued liquid diarrhea without bloody appearance is   Musculoskeletal: Normal range of motion  Neurological: She is alert and oriented to person, place, and time  Skin: Skin is warm  Psychiatric: She has a normal mood and affect  Her speech is normal and behavior is normal        Additional Data:     Labs:      Results from last 7 days  Lab Units 12/30/18  0509   WBC Thousand/uL 4 80   HEMOGLOBIN g/dL 10 0*   HEMATOCRIT % 29 8*   PLATELETS Thousands/uL 192   NEUTROS PCT % 64   LYMPHS PCT % 19*   MONOS PCT % 11   EOS PCT % 5       Results from last 7 days  Lab Units 12/30/18  0509   POTASSIUM mmol/L 3 7   CHLORIDE mmol/L 110*   CO2 mmol/L 27   BUN mg/dL 8   CREATININE mg/dL 0 49*   CALCIUM mg/dL 8 3*   ALK PHOS U/L 73   ALT U/L 77*   AST U/L 29       Results from last 7 days  Lab Units 12/29/18  0456   INR  1 05               * I Have Reviewed All Lab Data Listed Above  * Additional Pertinent Lab Tests Reviewed:  Jacinda 66 Admission  Reviewed    Imaging:  Imaging Reports Reviewed Today Include:  None    Recent Cultures (last 7 days):           Last 24 Hours Medication List:     Current Facility-Administered Medications:  acetaminophen 650 mg Oral Q6H PRN MARILEE VoNP    albuterol 2 5 mg Nebulization Q4H PRN Emilee Lorenzo, MARILEENP    chlorhexidine 15 mL Swish & Spit Q12H [facility] MARILEE VoNP    furosemide 20 mg Oral Early Morning Emilee Lorenzo, CRNP    loperamide 2 mg Oral Q2H PRN Emilee Lorenzo, MARILEENP    montelukast 10 mg Oral HS MARILEE VoNP    ondansetron 4 mg Intravenous Q6H PRN LEEROY Vo    pantoprozole (PROTONIX) infusion (Continuous) 8 mg/hr Intravenous Continuous LEEROY Vo Last Rate: 8 mg/hr (12/29/18 1750)   potassium chloride 20 mEq Oral Daily LEEROY Vo    sodium chloride 50 mL/hr Intravenous Continuous Trip Ballard MD Last Rate: 50 mL/hr (12/29/18 1547)        Today, Patient Was Seen By: LEEROY Márquez    ** Please Note: Dictation voice to text software may have been used in the creation of this document   **

## 2018-12-31 VITALS
TEMPERATURE: 98.6 F | SYSTOLIC BLOOD PRESSURE: 120 MMHG | HEART RATE: 67 BPM | HEIGHT: 62 IN | RESPIRATION RATE: 18 BRPM | WEIGHT: 149.03 LBS | DIASTOLIC BLOOD PRESSURE: 60 MMHG | BODY MASS INDEX: 27.43 KG/M2 | OXYGEN SATURATION: 100 %

## 2018-12-31 LAB
HGB BLD-MCNC: 10.7 G/DL (ref 12–16)
POTASSIUM SERPL-SCNC: 3.5 MMOL/L (ref 3.5–5.5)

## 2018-12-31 PROCEDURE — 99232 SBSQ HOSP IP/OBS MODERATE 35: CPT | Performed by: SURGERY

## 2018-12-31 PROCEDURE — 84132 ASSAY OF SERUM POTASSIUM: CPT | Performed by: NURSE PRACTITIONER

## 2018-12-31 PROCEDURE — C9113 INJ PANTOPRAZOLE SODIUM, VIA: HCPCS | Performed by: NURSE PRACTITIONER

## 2018-12-31 PROCEDURE — 85018 HEMOGLOBIN: CPT | Performed by: NURSE PRACTITIONER

## 2018-12-31 PROCEDURE — 87505 NFCT AGENT DETECTION GI: CPT | Performed by: SPECIALIST

## 2018-12-31 PROCEDURE — 99239 HOSP IP/OBS DSCHRG MGMT >30: CPT | Performed by: NURSE PRACTITIONER

## 2018-12-31 RX ORDER — PANTOPRAZOLE SODIUM 40 MG/1
40 TABLET, DELAYED RELEASE ORAL
Status: DISCONTINUED | OUTPATIENT
Start: 2019-01-01 | End: 2018-12-31 | Stop reason: HOSPADM

## 2018-12-31 RX ORDER — LOPERAMIDE HYDROCHLORIDE 2 MG/1
2 CAPSULE ORAL 4 TIMES DAILY PRN
Qty: 30 CAPSULE | Refills: 0 | Status: SHIPPED | OUTPATIENT
Start: 2018-12-31 | End: 2019-10-04 | Stop reason: HOSPADM

## 2018-12-31 RX ORDER — PANTOPRAZOLE SODIUM 40 MG/1
40 TABLET, DELAYED RELEASE ORAL
Qty: 30 TABLET | Refills: 0 | Status: SHIPPED | OUTPATIENT
Start: 2019-01-01 | End: 2019-09-29

## 2018-12-31 RX ADMIN — SODIUM CHLORIDE 8 MG/HR: 9 INJECTION, SOLUTION INTRAVENOUS at 05:44

## 2018-12-31 RX ADMIN — CHLORHEXIDINE GLUCONATE 0.12% ORAL RINSE 15 ML: 1.2 LIQUID ORAL at 08:45

## 2018-12-31 RX ADMIN — ACETAMINOPHEN 650 MG: 325 TABLET ORAL at 03:34

## 2018-12-31 RX ADMIN — POTASSIUM CHLORIDE 20 MEQ: 1500 TABLET, EXTENDED RELEASE ORAL at 08:45

## 2018-12-31 RX ADMIN — SODIUM CHLORIDE 50 ML/HR: 9 INJECTION, SOLUTION INTRAVENOUS at 08:45

## 2018-12-31 RX ADMIN — FUROSEMIDE 20 MG: 20 TABLET ORAL at 07:25

## 2018-12-31 NOTE — NURSING NOTE
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

## 2018-12-31 NOTE — ASSESSMENT & PLAN NOTE
· Clinically is much improved  · Surgery input is appreciated  · Stool is soft and no further bleeding noted    · The patient will need to follow up with Dr Sascha Aggarwal outpatient for possible colonoscopy  · Continue with imodium PRN

## 2018-12-31 NOTE — SOCIAL WORK
Pt discussed in care coordination rounds  Pt for possible discharge home later today  Pt has follow up PCP appointment on 1/3/19 at [phone] with D r Rosette Son  Pt has high LACE score but cannot be referred to OP CM due to not having an [facility] PCP

## 2018-12-31 NOTE — NURSING NOTE
Pt discharged home  Pt denies pain, denies shortness of breath  IV removed without complications and tip intact  Discharge instructions read and explained to pt, all questions answered  All belongings with pt  Pt escorted to front entrance by wheelchair and assisted into car of friend

## 2018-12-31 NOTE — DISCHARGE SUMMARY
Discharge- Casandra Herbert 1935, 82 y o  female MRN: [id number]    Unit/Bed#: -02 Encounter: [id number]    Primary Care Provider: Adam Flowers DO   Date and time admitted to hospital: 12/28/2018  6:29 PM        * Diverticulosis of intestine with bleeding   Assessment & Plan    · Clinically is much improved  · Surgery input is appreciated  · Stool is soft and no further bleeding noted  · The patient will need to follow up with Dr Mirian Maldonado outpatient for possible colonoscopy  · Continue with imodium PRN        Rectal bleed   Assessment & Plan    · No further rectal bleed  · Hemoglobin stable  · The patient will need to follow up with Dr Shante Rosales as an outpatient  Functional diarrhea   Assessment & Plan    · Stool was negative for C diff, Salmonella, Shigella, Campylobacter   · Diarrhea is much improved          Cardiac disease   Assessment & Plan    · Stable and chronic       COPD (chronic obstructive pulmonary disease) ([facility])   Assessment & Plan    · Chronic and stable  · Not exacerbation     Asthma   Assessment & Plan    · Stable and chronic  · Continue with albuterol neb treatments  · Patient doesn't use inhalers  · Continue singulair           Discharging Physician / Practitioner: Raúl Crawford  PCP: Adam Flowers DO  Admission Date:   Admission Orders     Ordered        12/28/18 1925  Inpatient Admission  Once             Discharge Date: 12/31/18    Resolved Problems  Date Reviewed: 12/31/2018    None          Consultations During Hospital Stay:  · Surgery     Procedures Performed:   · None     Significant Findings / Test Results:   · CT a/p shows 1   There is pancolonic diverticulosis, however, the evaluation of  gastrointestinal tract is limited due to lack of contrast   The further  evaluation with colonoscopy would be advised with patient history of  hematochezia    2   The liver demonstrates homogeneous low attenuation without focal mass  lesion representing fatty infiltration  3   The fat-containing anterior abdominal hernia measures 5 0 x 3 0 cm  4   Thoracic and lumbar spine multilevel moderate osteoarthritic and  vacuum disc degenerative changes   There is thoracolumbar scoliosis  5   There are coronary artery, atherosclerotic aortic and vascular  Calcifications  · CXR shows Findings may represent early interstitial pulmonary edema, for clinical  Correlation  · Stool for occult blood +     Incidental Findings:   · None      Test Results Pending at Discharge (will require follow up): · Final stool cultures      Outpatient Tests Requested:  · Follow up with Dr Joe Muir- Surgery for possible coloscopy     Complications:  None     Reason for Admission:  Lower GI bleed    Hospital Course:     Andreas Sheppard is a 82 y o  female patient who originally presented to the hospital on 12/28/2018 due to lower GI bleed and liquid stools for 3 days  The patient was admitted  CT of abdomen and pelvis result noted above  The patient was started on pantoprazole drip for suspected upper GI bleed  Stool for occult blood is positive however the patient hemoglobin has been stabilizing at 10-11  No further active bleeding noted over the past 24 hours  Surgery recommends to follow up outpatient coloscopy  So far stool culture is negative  C diff negative  Overall clinically is much improved and is medically cleared to be discharged home  Please see above list of diagnoses and related plan for additional information  Condition at Discharge: good     Discharge Day Visit / Exam:     Subjective:  Feeling better  No abdominal pain, nausea, vomiting  Stool is more soft now without any blood    Vitals: Blood Pressure: 120/60 (12/31/18 1516)  Pulse: 67 (12/31/18 1516)  Temperature: 98 6 °F (37 °C) (12/31/18 1516)  Temp Source: Tympanic (12/31/18 1516)  Respirations: 18 (12/31/18 1516)  Height: 5' 2" (157 5 cm) (12/28/18 2001)  Weight - Scale: 67 6 kg (149 lb 0 5 oz) (12/28/18 2001)  SpO2: 100 % (12/31/18 1516)  Exam:   Physical Exam   Constitutional: She is oriented to person, place, and time  She appears well-developed  No distress  HENT:   Head: Normocephalic and atraumatic  Mouth/Throat: Oropharynx is clear and moist    Eyes: Pupils are equal, round, and reactive to light  Conjunctivae and EOM are normal    Neck: Normal range of motion  Neck supple  Cardiovascular: Normal rate, regular rhythm and normal heart sounds  Exam reveals no gallop and no friction rub  No murmur heard  Pulmonary/Chest: Effort normal  She has no wheezes  She has no rales  Coarse      Abdominal: Soft  Bowel sounds are normal  She exhibits no distension  There is no tenderness  There is no rebound  Musculoskeletal: Normal range of motion  She exhibits no edema, tenderness or deformity  Neurological: She is alert and oriented to person, place, and time  No cranial nerve deficit  Skin: Skin is warm and dry  No rash noted  No erythema  Psychiatric: She has a normal mood and affect  Her behavior is normal  Thought content normal    Somewhat anxious      Vitals reviewed  Discussion with Family: n/a     Discharge instructions/Information to patient and family:   See after visit summary for information provided to patient and family  Provisions for Follow-Up Care:  See after visit summary for information related to follow-up care and any pertinent home health orders  Disposition:     Home    For Discharges to [facility] SN[facility]:   · Not Applicable to this Patient - Not Applicable to this Patient    Planned Readmission: no      Discharge Statement:  I spent 45 minutes discharging the patient  This time was spent on the day of discharge  I had direct contact with the patient on the day of discharge   Greater than 50% of the total time was spent examining patient, answering all patient questions, arranging and discussing plan of care with patient as well as directly providing post-discharge instructions  Additional time then spent on discharge activities  Discharge Medications:  See after visit summary for reconciled discharge medications provided to patient and family        ** Please Note: This note has been constructed using a voice recognition system **

## 2018-12-31 NOTE — ASSESSMENT & PLAN NOTE
· No further rectal bleed  · Hemoglobin stable  · The patient will need to follow up with Dr Rangel Chung as an outpatient

## 2018-12-31 NOTE — PROGRESS NOTES
Progress Note - General Surgery   Sotero Gudino 82 y o  female MRN: [id number]  Unit/Bed#: -02 Encounter: [id number]    Assessment:  82-year-old female admitted with diarrhea, abdominal pain, and rectal bleeding  No additional bleeding noted  Diarrhea much improved  C diff negative  Remainder of stool studies pending  Tolerating regular diet  White blood cell count remains normal  H&H stable    Plan:  - diet as tolerated  - recommend outpatient colonoscopy  - no acute surgical intervention at this time  - patient stable from surgical perspective for discharge      My evaluation recommendations have been discussed with the covering advanced practitioner  Subjective/Objective   Chief Complaint:   No new complaints    Subjective:   No abdominal pain  No additional bleeding  No nausea vomiting  Tolerating diet  Objective:     Blood pressure 121/55, pulse 88, temperature 98 2 °F (36 8 °C), temperature source Tympanic, resp  rate 20, height 5' 2" (1 575 m), weight 67 6 kg (149 lb 0 5 oz), SpO2 98 %  ,Body mass index is 27 26 kg/m²  Intake/Output Summary (Last 24 hours) at 12/31/18 1141  Last data filed at 12/31/18 5397   Gross per 24 hour   Intake              720 ml   Output                0 ml   Net              720 ml       Invasive Devices     Peripheral Intravenous Line            Peripheral IV 12/28/18 Right Hand 2 days                Physical Exam:   General:  No acute distress, sitting up comfortably  HEENT:  Normocephalic, atraumatic,  Pulmonary:  No respiratory distress, no real labored breathing, symmetric chest rise  GI:  Abdomen obese, soft, nontender, nondistended  Neurologic:  Alert oriented x3, cranial 2-12 grossly intact  Psych:  Mood and affect appropriate              Lab, Imaging and other studies:  I have personally reviewed pertinent lab results    , CBC:   Lab Results   Component Value Date    HGB 10 7 (L) 12/31/2018   , CMP:   Lab Results   Component Value Date    K 3 5 12/31/2018   , Coagulation: No results found for: PT, INR, APTT, Urinalysis: No results found for: Chantal Aidan, SPECGRAV, PHUR, LEUKOCYTESUR, NITRITE, PROTEINUA, GLUCOSEU, KETONESU, BILIRUBINUR, BLOODU, Amylase: No results found for: AMYLASE, Lipase: No results found for: LIPASE    VTE Mechanical Prophylaxis: sequential compression device

## 2019-01-01 LAB
CAMPYLOBACTER DNA SPEC NAA+PROBE: NORMAL
SALMONELLA DNA SPEC QL NAA+PROBE: NORMAL
SHIGA TOXIN STX GENE SPEC NAA+PROBE: NORMAL
SHIGELLA DNA SPEC QL NAA+PROBE: NORMAL

## 2019-01-02 LAB — O+P STL CONC: NORMAL

## 2019-01-03 ENCOUNTER — TRANSITIONAL CARE MANAGEMENT (OUTPATIENT)
Dept: INTERNAL MEDICINE CLINIC | Facility: CLINIC | Age: 84
End: 2019-01-03

## 2019-01-14 PROCEDURE — 1124F ACP DISCUSS-NO DSCNMKR DOCD: CPT | Performed by: SPECIALIST

## 2019-01-17 ENCOUNTER — ANESTHESIA EVENT (OUTPATIENT)
Dept: PERIOP | Facility: HOSPITAL | Age: 84
End: 2019-01-17
Payer: MEDICARE

## 2019-01-17 NOTE — ANESTHESIA PREPROCEDURE EVALUATION
Review of Systems/Medical History  Patient summary reviewed  Chart reviewed      Cardiovascular  EKG reviewed,   Comment: EKG NSR With PSVCs,  Pulmonary  COPD , Asthma ,        GI/Hepatic            Endo/Other     GYN       Hematology   Musculoskeletal       Neurology   Psychology         Lab Results   Component Value Date    WBC 4 80 12/30/2018    HGB 10 7 (L) 12/31/2018    HCT 29 8 (L) 12/30/2018    MCV 84 12/30/2018     12/30/2018     Lab Results   Component Value Date    GLUCOSE 77 01/08/2016    CALCIUM 8 3 (L) 12/30/2018     01/08/2016    K 3 5 12/31/2018    CO2 27 12/30/2018     (H) 12/30/2018    BUN 8 12/30/2018    CREATININE 0 49 (L) 12/30/2018     Lab Results   Component Value Date    INR 1 05 12/29/2018    INR 1 11 12/28/2018    PROTIME 12 2 12/29/2018    PROTIME 12 8 12/28/2018     Lab Results   Component Value Date    PTT 30 12/28/2018     Physical Exam    Airway    Mallampati score: III  TM Distance: >3 FB  Neck ROM: full     Dental   upper dentures,     Cardiovascular  Cardiovascular exam normal    Pulmonary  Pulmonary exam normal     Other Findings        Anesthesia Plan  ASA Score- 3     Anesthesia Type- IV sedation with anesthesia with ASA Monitors  Additional Monitors:   Airway Plan:     Comment: Plan discussed is MAC with GA as backup  I personally discussed risks and benefits to this anesthetic  All patient questions were answered  Pt agrees with anesthesia plan        Plan Factors-    Induction- intravenous  Postoperative Plan-     Informed Consent- Anesthetic plan and risks discussed with patient  I personally reviewed this patient with the CRNA  Discussed and agreed on the Anesthesia Plan with the CRNA  Glenn Ott

## 2019-01-21 ENCOUNTER — ANESTHESIA (OUTPATIENT)
Dept: PERIOP | Facility: HOSPITAL | Age: 84
End: 2019-01-21
Payer: MEDICARE

## 2019-01-21 ENCOUNTER — HOSPITAL ENCOUNTER (OUTPATIENT)
Facility: HOSPITAL | Age: 84
Setting detail: OUTPATIENT SURGERY
Discharge: HOME | End: 2019-01-21
Attending: SPECIALIST | Admitting: SPECIALIST
Payer: MEDICARE

## 2019-01-21 VITALS
HEART RATE: 63 BPM | HEIGHT: 60 IN | RESPIRATION RATE: 18 BRPM | WEIGHT: 151 LBS | SYSTOLIC BLOOD PRESSURE: 145 MMHG | DIASTOLIC BLOOD PRESSURE: 65 MMHG | BODY MASS INDEX: 29.64 KG/M2 | OXYGEN SATURATION: 91 % | TEMPERATURE: 97.8 F

## 2019-01-21 DIAGNOSIS — K57.91 DIVERTICULOSIS OF INTESTINE WITHOUT PERFORATION OR ABSCESS WITH BLEEDING: ICD-10-CM

## 2019-01-21 PROBLEM — K63.5 COLORECTAL POLYPS: Status: ACTIVE | Noted: 2019-01-21

## 2019-01-21 PROBLEM — K62.1 COLORECTAL POLYPS: Status: ACTIVE | Noted: 2019-01-21

## 2019-01-21 PROCEDURE — 88305 TISSUE EXAM BY PATHOLOGIST: CPT | Performed by: PATHOLOGY

## 2019-01-21 PROCEDURE — 45385 COLONOSCOPY W/LESION REMOVAL: CPT | Performed by: SPECIALIST

## 2019-01-21 RX ORDER — ALPRAZOLAM 0.5 MG/1
0.5 TABLET ORAL
COMMUNITY
End: 2020-03-22 | Stop reason: HOSPADM

## 2019-01-21 RX ORDER — FAMOTIDINE 40 MG/1
40 TABLET, FILM COATED ORAL AS NEEDED
Status: ON HOLD | COMMUNITY
End: 2019-11-04 | Stop reason: SDUPTHER

## 2019-01-21 RX ORDER — SODIUM CHLORIDE, SODIUM LACTATE, POTASSIUM CHLORIDE, CALCIUM CHLORIDE 600; 310; 30; 20 MG/100ML; MG/100ML; MG/100ML; MG/100ML
125 INJECTION, SOLUTION INTRAVENOUS CONTINUOUS
Status: CANCELLED | OUTPATIENT
Start: 2019-01-21

## 2019-01-21 RX ORDER — PROPOFOL 10 MG/ML
INJECTION, EMULSION INTRAVENOUS AS NEEDED
Status: DISCONTINUED | OUTPATIENT
Start: 2019-01-21 | End: 2019-01-21 | Stop reason: SURG

## 2019-01-21 RX ORDER — MAGNESIUM HYDROXIDE 1200 MG/15ML
LIQUID ORAL AS NEEDED
Status: DISCONTINUED | OUTPATIENT
Start: 2019-01-21 | End: 2019-01-21 | Stop reason: HOSPADM

## 2019-01-21 RX ORDER — ASPIRIN 81 MG/1
81 TABLET ORAL DAILY
COMMUNITY
Start: 2018-07-10 | End: 2019-10-08 | Stop reason: HOSPADM

## 2019-01-21 RX ORDER — SODIUM CHLORIDE, SODIUM LACTATE, POTASSIUM CHLORIDE, CALCIUM CHLORIDE 600; 310; 30; 20 MG/100ML; MG/100ML; MG/100ML; MG/100ML
125 INJECTION, SOLUTION INTRAVENOUS CONTINUOUS
Status: DISCONTINUED | OUTPATIENT
Start: 2019-01-21 | End: 2019-01-21 | Stop reason: HOSPADM

## 2019-01-21 RX ADMIN — PROPOFOL 20 MG: 10 INJECTION, EMULSION INTRAVENOUS at 09:53

## 2019-01-21 RX ADMIN — PROPOFOL 70 MG: 10 INJECTION, EMULSION INTRAVENOUS at 09:31

## 2019-01-21 RX ADMIN — LIDOCAINE HYDROCHLORIDE 100 MG: 20 INJECTION, SOLUTION INTRAVENOUS at 09:31

## 2019-01-21 RX ADMIN — PROPOFOL 30 MG: 10 INJECTION, EMULSION INTRAVENOUS at 09:36

## 2019-01-21 RX ADMIN — PROPOFOL 30 MG: 10 INJECTION, EMULSION INTRAVENOUS at 09:34

## 2019-01-21 RX ADMIN — PROPOFOL 40 MG: 10 INJECTION, EMULSION INTRAVENOUS at 09:45

## 2019-01-21 RX ADMIN — PROPOFOL 30 MG: 10 INJECTION, EMULSION INTRAVENOUS at 09:40

## 2019-01-21 RX ADMIN — SODIUM CHLORIDE, POTASSIUM CHLORIDE, SODIUM LACTATE AND CALCIUM CHLORIDE 125 ML/HR: 600; 310; 30; 20 INJECTION, SOLUTION INTRAVENOUS at 08:03

## 2019-01-21 NOTE — DISCHARGE SUMMARY
Discharge Summary - Sotero Gudino 82 y o  female MRN: [id number]    Unit/Bed#: OR POOL Encounter: [id number]    Admission Date:     Admitting Diagnosis: Diverticulosis of intestine without perforation or abscess with bleeding [K57 [id number]]    HPI: 83 yo WF with hx of complicated diverticulitis, 13 ys s/p sigmoid resection and bladder repair for colovesical fistula  Patient presented one month prior with alternating diarrhea and straining at stool, followed by shira rectal bleeding, which promptly resolved  Now presents for follow up colonoscopy     Procedures Performed: No orders of the defined types were placed in this encounter  Colonoscopy  Rectal polypectomy x 2    Summary of Hospital Course: Presented for outpatient colonoscopy  Found to have diffuse diverticulosis, and 2 small rectal polyps that were removed with snare cautery  Significant Findings, Care, Treatment and Services Provided: See Above    Complications: None    Discharge Diagnosis:   Diverticulosis  Rectal Polyps  COPD  GI Bleeding    Resolved Problems  Date Reviewed: 12/31/2018    None          Condition at Discharge: good         Discharge instructions/Information to patient and family:   See after visit summary for information provided to patient and family  Provisions for Follow-Up Care:  See after visit summary for information related to follow-up care and any pertinent home health orders  PCP: Rosette Miles MD    Disposition: Home    Planned Readmission: No    Discharge Statement   I spent 15 minutes discharging the patient  This time was spent on the day of discharge  I had direct contact with the patient on the day of discharge  Additional documentation is required if more than 30 minutes were spent on discharge  Discharge Medications:  See after visit summary for reconciled discharge medications provided to patient and family

## 2019-01-21 NOTE — DISCHARGE INSTR - AVS FIRST PAGE
Call for the results for the polyps that were removed, in about a week    Eat a high fiber diet    Consider the use of Metamucil or Citrucel as a fiber supplement    Use imodium or lomotil, (over the counter meds), very sparingly for relief of diarrhea       Return to the Emergency Room for recurrent Gastrointestinal Bleeding

## 2019-01-21 NOTE — DISCHARGE INSTRUCTIONS
Colorectal Polyps   WHAT YOU NEED TO KNOW:   Colorectal polyps are small growths of tissue in the lining of the colon and rectum  Most polyps are hyperplastic polyps and are usually benign (noncancerous)  Certain types of polyps, called adenomatous polyps, may turn into cancer  DISCHARGE INSTRUCTIONS:   Follow up with your healthcare provider or gastroenterologist as directed: You may need to return for more tests, such as another colonoscopy  Write down your questions so you remember to ask them during your visits  Reduce your risk for colorectal polyps:   · Eat a variety of healthy foods:  Healthy foods include fruit, vegetables, whole-grain breads, low-fat dairy products, beans, lean meat, and fish  Ask if you need to be on a special diet  · Maintain a healthy weight:  Ask your healthcare provider if you need to lose weight and how much you need to lose  Ask for help with a weight loss program     · Exercise:  Begin to exercise slowly and do more as you get stronger  Talk with your healthcare provider before you start an exercise program      · Limit alcohol:  Your risk for polyps increases the more you drink  · Do not smoke: If you smoke, it is never too late to quit  Ask for information about how to stop  For support and more information:   · Sarah Almanzar ([facility])  [address], [place] [address]  Phone: [phone]  Web Address: www digestive  niddk nih gov  Contact your healthcare provider or gastroenterologist if:   · You have a fever  · You have chills, a cough, or feel weak and achy  · You have abdominal pain that does not go away or gets worse after you take medicine  · Your abdomen is swollen  · You are losing weight without trying  · You have questions or concerns about your condition or care  Seek care immediately or call 911 if:   · You have sudden shortness of breath       · You have a fast heart rate, fast breathing, or are too dizzy to stand up  · You have severe abdominal pain  · You see blood in your bowel movement  © 2017 [employer]  Information is for End User's use only and may not be sold, redistributed or otherwise used for commercial purposes  All illustrations and images included in CareNotes® are the copyrighted property of [employer]  or [employer]  The above information is an  only  It is not intended as medical advice for individual conditions or treatments  Talk to your doctor, nurse or pharmacist before following any medical regimen to see if it is safe and effective for you  Colorectal Polyps   WHAT YOU NEED TO KNOW:   Colorectal polyps are small growths of tissue in the lining of the colon and rectum  Most polyps are hyperplastic polyps and are usually benign (noncancerous)  Certain types of polyps, called adenomatous polyps, may turn into cancer  DISCHARGE INSTRUCTIONS:   Follow up with your healthcare provider or gastroenterologist as directed: You may need to return for more tests, such as another colonoscopy  Write down your questions so you remember to ask them during your visits  Reduce your risk for colorectal polyps:   · Eat a variety of healthy foods:  Healthy foods include fruit, vegetables, whole-grain breads, low-fat dairy products, beans, lean meat, and fish  Ask if you need to be on a special diet  · Maintain a healthy weight:  Ask your healthcare provider if you need to lose weight and how much you need to lose  Ask for help with a weight loss program     · Exercise:  Begin to exercise slowly and do more as you get stronger  Talk with your healthcare provider before you start an exercise program      · Limit alcohol:  Your risk for polyps increases the more you drink  · Do not smoke: If you smoke, it is never too late to quit  Ask for information about how to stop    For support and more information:   · [facility] ([facility]  [address], [place] [address]  Phone: [phone]  Web Address: www digestive  niddk nih gov  Contact your healthcare provider or gastroenterologist if:   · You have a fever  · You have chills, a cough, or feel weak and achy  · You have abdominal pain that does not go away or gets worse after you take medicine  · Your abdomen is swollen  · You are losing weight without trying  · You have questions or concerns about your condition or care  Seek care immediately or call 911 if:   · You have sudden shortness of breath  · You have a fast heart rate, fast breathing, or are too dizzy to stand up  · You have severe abdominal pain  · You see blood in your bowel movement  © 2017 [employer]  Information is for End User's use only and may not be sold, redistributed or otherwise used for commercial purposes  All illustrations and images included in CareNotes® are the copyrighted property of [employer]  or [employer]  The above information is an  only  It is not intended as medical advice for individual conditions or treatments  Talk to your doctor, nurse or pharmacist before following any medical regimen to see if it is safe and effective for you  Diverticulosis   WHAT YOU NEED TO KNOW:   Diverticulosis is a condition that causes small pockets called diverticula to form in your intestine  These pockets make it difficult for bowel movements to pass through your digestive system  DISCHARGE INSTRUCTIONS:   Return to the emergency department if:   · You have severe pain on the left side of your lower abdomen  · Your bowel movements are bright or dark red  Contact your healthcare provider if:   · You have a fever and chills  · You feel dizzy or lightheaded  · You have nausea, or you are vomiting  · You have a change in your bowel movements      · You have questions or concerns about your condition or care  Medicines:   · Medicines  to soften your bowel movements may be given  You may also need medicines to treat symptoms such as bloating and pain  · Take your medicine as directed  Contact your healthcare provider if you think your medicine is not helping or if you have side effects  Tell him or her if you are allergic to any medicine  Keep a list of the medicines, vitamins, and herbs you take  Include the amounts, and when and why you take them  Bring the list or the pill bottles to follow-up visits  Carry your medicine list with you in case of an emergency  Self-care: The goal of treatment is to manage any symptoms you have and prevent other problems such as diverticulitis  Diverticulitis is swelling or infection of the diverticula  Your healthcare provider may recommend any of the following:  · Eat a variety of high-fiber foods  High-fiber foods help you have regular bowel movements  High-fiber foods include cooked beans, fruits, vegetables, and some cereals  Most adults need 25 to 35 grams of fiber each day  Your healthcare provider may recommend that you have more  Ask your healthcare provider how much fiber you need  Increase fiber slowly  You may have abdominal discomfort, bloating, and gas if you add fiber to your diet too quickly  You may need to take a fiber supplement if you are not getting enough fiber from food  · Drink liquids as directed  You may need to drink 2 to 3 liters (8 to 12 cups) of liquids every day  Ask your healthcare provider how much liquid to drink each day and which liquids are best for you  · Apply heat  on your abdomen for 20 to 30 minutes every 2 hours for as many days as directed  Heat helps decrease pain and muscle spasms  Help prevent diverticulitis or other symptoms: The following may help decrease your risk for diverticulitis or symptoms, such as bleeding   Talk to your provider about these or other things you can do to prevent problems that may occur with diverticulosis  · Exercise regularly  Ask your healthcare provider about the best exercise plan for you  Exercise can help you have regular bowel movements  Get 30 minutes of exercise on most days of the week  · Maintain a healthy weight  Ask your healthcare provider how much you should weigh  Ask him or her to help you create a weight loss plan if you are overweight  · Do not smoke  Nicotine and other chemicals in cigarettes increase your risk for diverticulitis  Ask your healthcare provider for information if you currently smoke and need help to quit  E-cigarettes or smokeless tobacco still contain nicotine  Talk to your healthcare provider before you use these products  · Ask your healthcare provider if it is safe to take NSAIDs  NSAIDs may increase your risk of diverticulitis  Follow up with your healthcare provider as directed:  Write down your questions so you remember to ask them during your visits  © 2017 [employer]  Information is for End User's use only and may not be sold, redistributed or otherwise used for commercial purposes  All illustrations and images included in CareNotes® are the copyrighted property of [employer]  or [employer]  The above information is an  only  It is not intended as medical advice for individual conditions or treatments  Talk to your doctor, nurse or pharmacist before following any medical regimen to see if it is safe and effective for you  Colonoscopy   WHAT YOU NEED TO KNOW:   A colonoscopy is a procedure to examine the inside of your colon (intestine) with a scope  Polyps or tissue growths may have been removed during your colonoscopy  It is normal to feel bloated and to have some abdominal discomfort  You should be passing gas  If you have hemorrhoids or you had polyps removed, you may have a small amount of bleeding         DISCHARGE INSTRUCTIONS:   Seek care immediately if:   · You have a large amount of bright red blood in your bowel movements  · Your abdomen is hard and firm and you have severe pain  · You have sudden trouble breathing  Contact your healthcare provider if:   · You develop a rash or hives  · You have a fever within 24 hours of your procedure  · You have not had a bowel movement for 3 days after your procedure  · You have questions or concerns about your condition or care  Activity:   · Do not lift, strain, or run  for 3 days after your procedure  · Rest after your procedure  You have been given medicine to relax you  Do not  drive or make important decisions until the day after your procedure  Return to your normal activity as directed  · Relieve gas and discomfort from bloating  by lying on your right side with a heating pad on your abdomen  You may need to take short walks to help the gas move out  Eat small meals until bloating is relieved  If you had polyps removed: For 7 days after your procedure:  · Do not  take aspirin  · Do not  go on long car rides  Help prevent constipation:   · Eat a variety of healthy foods  Healthy foods include fruit, vegetables, whole-grain breads, low-fat dairy products, beans, lean meat, and fish  Ask if you need to be on a special diet  Your healthcare provider may recommend that you eat high-fiber foods such as cooked beans  Fiber helps you have regular bowel movements  · Drink liquids as directed  Adults should drink between 9 and 13 eight-ounce cups of liquid every day  Ask what amount is best for you  For most people, good liquids to drink are water, juice, and milk  · Exercise as directed  Talk to your healthcare provider about the best exercise plan for you  Exercise can help prevent constipation, decrease your blood pressure and improve your health    Follow up with your healthcare provider as directed:  Write down your questions so you remember to ask them during your visits  © 2017 [employer] Information is for End User's use only and may not be sold, redistributed or otherwise used for commercial purposes  All illustrations and images included in CareNotes® are the copyrighted property of [employer]  or [employer]  The above information is an  only  It is not intended as medical advice for individual conditions or treatments  Talk to your doctor, nurse or pharmacist before following any medical regimen to see if it is safe and effective for you  High Fiber Diet   WHAT YOU NEED TO KNOW:   A high-fiber diet includes foods that have a high amount of fiber  Fiber is the part of fruits, vegetables, and grains that is not broken down by your body  Fiber keeps your bowel movements regular  Fiber can also help lower your cholesterol level, control blood sugar in people with diabetes, and relieve constipation  Fiber can also help you control your weight because it helps you feel full faster  Most adults should eat 25 to 35 grams of fiber each day  Talk to your dietitian or healthcare provider about the amount of fiber you need    DISCHARGE INSTRUCTIONS:   Good sources of fiber:   · Foods with at least 4 grams of fiber per serving:      ¨ ? to ½ cup of high-fiber cereal (check the nutrition label on the box)    ¨ ½ cup of blackberries or raspberries    ¨ 4 dried prunes    ¨ 1 cooked artichoke    ¨ ½ cup of cooked legumes, such as lentils, or red, kidney, and tucker beans    · Foods with 1 to 3 grams of fiber per serving:      ¨ 1 slice of whole-wheat, pumpernickel, or rye bread    ¨ ½ cup of cooked brown rice    ¨ 4 whole-wheat crackers    ¨ 1 cup of oatmeal    ¨ ½ cup of cereal with 1 to 3 grams of fiber per serving (check the nutrition label on the box)    ¨ 1 small piece of fruit, such as an apple, banana, pear, kiwi, or orange    ¨ 3 dates    ¨ ½ cup of canned apricots, fruit cocktail, peaches, or pears    ¨ ½ cup of raw or cooked vegetables, such as carrots, cauliflower, cabbage, spinach, squash, or corn  Ways that you can increase fiber in your diet:   · Choose brown or wild rice instead of white rice  · Use whole wheat flour in recipes instead of white or all-purpose flour  · Add beans and peas to casseroles or soups  · Choose fresh fruit and vegetables with peels or skins on instead of juices  Other diet guidelines to follow:   · Add fiber to your diet slowly  You may have abdominal discomfort, bloating, and gas if you add fiber to your diet too quickly  · Drink plenty of liquids as you add fiber to your diet  You may have nausea or develop constipation if you do not drink enough water  Ask how much liquid to drink each day and which liquids are best for you  © 2017 [employer] Information is for End User's use only and may not be sold, redistributed or otherwise used for commercial purposes  All illustrations and images included in CareNotes® are the copyrighted property of [employer]  or [employer]  The above information is an  only  It is not intended as medical advice for individual conditions or treatments  Talk to your doctor, nurse or pharmacist before following any medical regimen to see if it is safe and effective for you  High Fiber Diet   WHAT YOU NEED TO KNOW:   A high-fiber diet includes foods that have a high amount of fiber  Fiber is the part of fruits, vegetables, and grains that is not broken down by your body  Fiber keeps your bowel movements regular  Fiber can also help lower your cholesterol level, control blood sugar in people with diabetes, and relieve constipation  Fiber can also help you control your weight because it helps you feel full faster  Most adults should eat 25 to 35 grams of fiber each day  Talk to your dietitian or healthcare provider about the amount of fiber you need    DISCHARGE INSTRUCTIONS: Good sources of fiber:   · Foods with at least 4 grams of fiber per serving:      ¨ ? to ½ cup of high-fiber cereal (check the nutrition label on the box)    ¨ ½ cup of blackberries or raspberries    ¨ 4 dried prunes    ¨ 1 cooked artichoke    ¨ ½ cup of cooked legumes, such as lentils, or red, kidney, and tucker beans    · Foods with 1 to 3 grams of fiber per serving:      ¨ 1 slice of whole-wheat, pumpernickel, or rye bread    ¨ ½ cup of cooked brown rice    ¨ 4 whole-wheat crackers    ¨ 1 cup of oatmeal    ¨ ½ cup of cereal with 1 to 3 grams of fiber per serving (check the nutrition label on the box)    ¨ 1 small piece of fruit, such as an apple, banana, pear, kiwi, or orange    ¨ 3 dates    ¨ ½ cup of canned apricots, fruit cocktail, peaches, or pears    ¨ ½ cup of raw or cooked vegetables, such as carrots, cauliflower, cabbage, spinach, squash, or corn  Ways that you can increase fiber in your diet:   · Choose brown or wild rice instead of white rice  · Use whole wheat flour in recipes instead of white or all-purpose flour  · Add beans and peas to casseroles or soups  · Choose fresh fruit and vegetables with peels or skins on instead of juices  Other diet guidelines to follow:   · Add fiber to your diet slowly  You may have abdominal discomfort, bloating, and gas if you add fiber to your diet too quickly  · Drink plenty of liquids as you add fiber to your diet  You may have nausea or develop constipation if you do not drink enough water  Ask how much liquid to drink each day and which liquids are best for you  © 2017 [employer] Information is for End User's use only and may not be sold, redistributed or otherwise used for commercial purposes  All illustrations and images included in CareNotes® are the copyrighted property of [employer]  or [employer]ony Lopez  The above information is an  only   It is not intended as medical advice for individual conditions or treatments  Talk to your doctor, nurse or pharmacist before following any medical regimen to see if it is safe and effective for you

## 2019-01-21 NOTE — OP NOTE
OPERATIVE REPORT  PATIENT NAME: David Davis    :  1935  MRN: [id number]  Pt Location: [facility] GI ROOM 01    SURGERY DATE: 2019    Surgeon(s) and Role:     * Anibal Fernandes MD - Primary    Preop Diagnosis:  Diverticulosis of intestine without perforation or abscess with bleeding [K57 91]    Post-Op Diagnosis Codes:     * Diverticulosis of intestine without perforation or abscess with bleeding [K57 91]     * Rectal polyp [K62 1]    Procedure(s) (LRB):  COLONOSCOPY W/ POLYPECTOMY (N/A)    Specimen(s):  ID Type Source Tests Collected by Time Destination   1 : Rectal polyp x2 Tissue Soft Tissue, Other TISSUE EXAM Anibal Fernandes MD 2019 [id number]        Estimated Blood Loss:   Minimal    Drains:  None       Anesthesia Type:   * No anesthesia type entered * Conscious Sedation    Operative Indications:  Diverticulosis of intestine without perforation or abscess with bleeding [K57 91]      Operative Findings:  Extensive Diverticulosis  Small Rectal Polyps    Complications:   None    Procedure and Technique:  The patient was identified by myself taken to the endoscopy suite and placed in a left decubitus position  A time-out was called the patient identified as eventually greens wake IV sedation was running all parties agreed this was the appropriate patient for the proposed procedure  A digital rectal exam was performed  There was some residual stool in the rectum there was no blood on the examining finger, sphincter tone was normal     The Olympus flexible colonoscope was lubricated and introduced  There was residual prep solution and the rectum which was ultimately irrigated and aspirated  The rectum insufflated normally with air  The colonoscope was advanced without difficulty in the process noting diverticulosis throughout the colon, primarily on the left side  Prep was borderline    With irrigation and aspiration and adequate exam was possible, although subtle findings could easily have been obscured  The cecum was identified residual prep solution was irrigated and aspirated the scope slowly withdrawn and the mucosa examined in detail  There were scattered diverticula of the ascending colon and transverse colon  The ascending colon itself was with essentially unremarkable with no mass lesions or mucosal lesions  On withdrawing the scope back through the splenic flexure into the descending colon were diverticula were noted  The discrete anastomosis of colon to the top of the rectum number prior resection distinct  On withdrawing the scope back into the rectum the scope was retroflexed and 2 small polyps were identified  These were subsequently removed using the snare cautery and retrieved using the suction and a trap on the suction  The scope was then straightened and air was withdrawn the scope was removed and the procedure was terminated  The patient was then taken back to the ambulatory procedure unit in good condition having tolerated the procedure well     I was present for the entire procedure    Patient Disposition:  APU    SIGNATURE: Melody Ball MD  DATE: January 21, 2019  TIME: 10:04 AM

## 2019-01-21 NOTE — ANESTHESIA POSTPROCEDURE EVALUATION
Post-Op Assessment Note      CV Status:  Stable    Mental Status:  Alert and awake    Hydration Status:  Euvolemic    PONV Controlled:  Controlled    Airway Patency:  Patent    Post Op Vitals Reviewed: Yes          Staff: CRNA           BP   93/55   Temp  97 1   Pulse  83   Resp   16   SpO2   98

## 2019-02-04 ENCOUNTER — TRANSCRIBE ORDERS (OUTPATIENT)
Dept: ADMINISTRATIVE | Facility: HOSPITAL | Age: 84
End: 2019-02-04

## 2019-02-04 DIAGNOSIS — R06.00 DYSPNEA, UNSPECIFIED TYPE: Primary | ICD-10-CM

## 2019-02-14 ENCOUNTER — HOSPITAL ENCOUNTER (OUTPATIENT)
Dept: PULMONOLOGY | Facility: HOSPITAL | Age: 84
Discharge: HOME | End: 2019-02-14
Payer: MEDICARE

## 2019-02-14 DIAGNOSIS — R06.00 DYSPNEA, UNSPECIFIED TYPE: ICD-10-CM

## 2019-02-14 PROCEDURE — 94761 N-INVAS EAR/PLS OXIMETRY MLT: CPT

## 2019-04-08 ENCOUNTER — TRANSCRIBE ORDERS (OUTPATIENT)
Dept: ADMINISTRATIVE | Facility: HOSPITAL | Age: 84
End: 2019-04-08

## 2019-04-08 ENCOUNTER — APPOINTMENT (OUTPATIENT)
Dept: LAB | Facility: HOSPITAL | Age: 84
End: 2019-04-08
Attending: INTERNAL MEDICINE
Payer: MEDICARE

## 2019-04-08 DIAGNOSIS — R06.00 DYSPNEA, UNSPECIFIED TYPE: Primary | ICD-10-CM

## 2019-04-08 DIAGNOSIS — R06.00 DYSPNEA, UNSPECIFIED TYPE: ICD-10-CM

## 2019-04-08 LAB
ALBUMIN SERPL BCP-MCNC: 4.2 G/DL (ref 3.5–5.7)
ALP SERPL-CCNC: 93 U/L (ref 55–165)
ALT SERPL W P-5'-P-CCNC: 27 U/L (ref 7–52)
ANION GAP SERPL CALCULATED.3IONS-SCNC: 7 MMOL/L (ref 4–13)
AST SERPL W P-5'-P-CCNC: 20 U/L (ref 13–39)
BASOPHILS # BLD AUTO: 0 THOUSANDS/ΜL (ref 0–0.1)
BASOPHILS NFR BLD AUTO: 0 % (ref 0–1)
BILIRUB SERPL-MCNC: 0.3 MG/DL (ref 0.2–1)
BNP SERPL-MCNC: 25 PG/ML (ref 1–100)
BUN SERPL-MCNC: 19 MG/DL (ref 7–25)
CALCIUM SERPL-MCNC: 9.3 MG/DL (ref 8.6–10.5)
CHLORIDE SERPL-SCNC: 101 MMOL/L (ref 98–107)
CO2 SERPL-SCNC: 31 MMOL/L (ref 21–31)
CREAT SERPL-MCNC: 0.63 MG/DL (ref 0.6–1.2)
EOSINOPHIL # BLD AUTO: 0.2 THOUSAND/ΜL (ref 0–0.61)
EOSINOPHIL NFR BLD AUTO: 3 % (ref 0–6)
ERYTHROCYTE [DISTWIDTH] IN BLOOD BY AUTOMATED COUNT: 14.9 % (ref 11.6–15.1)
GFR SERPL CREATININE-BSD FRML MDRD: 84 ML/MIN/1.73SQ M
GLUCOSE SERPL-MCNC: 110 MG/DL (ref 65–140)
HCT VFR BLD AUTO: 39.1 % (ref 37–47)
HGB BLD-MCNC: 13 G/DL (ref 11.5–15.4)
LYMPHOCYTES # BLD AUTO: 1.3 THOUSANDS/ΜL (ref 0.6–4.47)
LYMPHOCYTES NFR BLD AUTO: 23 % (ref 14–44)
MCH RBC QN AUTO: 26.6 PG (ref 26.8–34.3)
MCHC RBC AUTO-ENTMCNC: 33.2 G/DL (ref 31.4–37.4)
MCV RBC AUTO: 80 FL (ref 82–98)
MONOCYTES # BLD AUTO: 0.5 THOUSAND/ΜL (ref 0.17–1.22)
MONOCYTES NFR BLD AUTO: 9 % (ref 4–12)
NEUTROPHILS # BLD AUTO: 3.8 THOUSANDS/ΜL (ref 1.85–7.62)
NEUTS SEG NFR BLD AUTO: 65 % (ref 43–75)
PLATELET # BLD AUTO: 195 THOUSANDS/UL (ref 149–390)
PMV BLD AUTO: 7.9 FL (ref 8.9–12.7)
POTASSIUM SERPL-SCNC: 4.3 MMOL/L (ref 3.5–5.5)
PROT SERPL-MCNC: 7 G/DL (ref 6.4–8.9)
RBC # BLD AUTO: 4.87 MILLION/UL (ref 3.81–5.12)
SODIUM SERPL-SCNC: 139 MMOL/L (ref 134–143)
WBC # BLD AUTO: 5.9 THOUSAND/UL (ref 4.31–10.16)

## 2019-04-08 PROCEDURE — 83880 ASSAY OF NATRIURETIC PEPTIDE: CPT

## 2019-04-08 PROCEDURE — 80053 COMPREHEN METABOLIC PANEL: CPT

## 2019-04-08 PROCEDURE — 36415 COLL VENOUS BLD VENIPUNCTURE: CPT

## 2019-04-08 PROCEDURE — 85025 COMPLETE CBC W/AUTO DIFF WBC: CPT

## 2019-09-24 ENCOUNTER — HOSPITAL ENCOUNTER (OUTPATIENT)
Dept: RADIOLOGY | Facility: HOSPITAL | Age: 84
Discharge: HOME | End: 2019-09-24
Attending: INTERNAL MEDICINE
Payer: MEDICARE

## 2019-09-24 ENCOUNTER — TRANSCRIBE ORDERS (OUTPATIENT)
Dept: ADMINISTRATIVE | Facility: HOSPITAL | Age: 84
End: 2019-09-24

## 2019-09-24 ENCOUNTER — APPOINTMENT (OUTPATIENT)
Dept: LAB | Facility: HOSPITAL | Age: 84
End: 2019-09-24
Attending: INTERNAL MEDICINE
Payer: MEDICARE

## 2019-09-24 DIAGNOSIS — M25.422 ELBOW EFFUSION, LEFT: Primary | ICD-10-CM

## 2019-09-24 DIAGNOSIS — M25.422 ELBOW EFFUSION, LEFT: ICD-10-CM

## 2019-09-24 LAB
ERYTHROCYTE [SEDIMENTATION RATE] IN BLOOD: 18 MM/HOUR (ref 0–30)
URATE SERPL-MCNC: 3.2 MG/DL (ref 2.3–7.6)

## 2019-09-24 PROCEDURE — 36415 COLL VENOUS BLD VENIPUNCTURE: CPT

## 2019-09-24 PROCEDURE — 85652 RBC SED RATE AUTOMATED: CPT

## 2019-09-24 PROCEDURE — 84550 ASSAY OF BLOOD/URIC ACID: CPT

## 2019-09-24 PROCEDURE — 73080 X-RAY EXAM OF ELBOW: CPT

## 2019-09-29 ENCOUNTER — HOSPITAL ENCOUNTER (EMERGENCY)
Facility: HOSPITAL | Age: 84
Discharge: HOME | End: 2019-09-29
Attending: FAMILY MEDICINE | Admitting: FAMILY MEDICINE
Payer: MEDICARE

## 2019-09-29 ENCOUNTER — APPOINTMENT (EMERGENCY)
Dept: RADIOLOGY | Facility: HOSPITAL | Age: 84
End: 2019-09-29
Payer: MEDICARE

## 2019-09-29 VITALS
SYSTOLIC BLOOD PRESSURE: 114 MMHG | WEIGHT: 170 LBS | DIASTOLIC BLOOD PRESSURE: 77 MMHG | HEART RATE: 85 BPM | RESPIRATION RATE: 18 BRPM | TEMPERATURE: 97.7 F | OXYGEN SATURATION: 98 % | BODY MASS INDEX: 33.38 KG/M2 | HEIGHT: 60 IN

## 2019-09-29 DIAGNOSIS — M54.9 BACK PAIN: ICD-10-CM

## 2019-09-29 DIAGNOSIS — M70.22 OLECRANON BURSITIS OF LEFT ELBOW: Primary | ICD-10-CM

## 2019-09-29 LAB
ANION GAP SERPL CALCULATED.3IONS-SCNC: 7 MMOL/L (ref 4–13)
BUN SERPL-MCNC: 24 MG/DL (ref 7–25)
CALCIUM SERPL-MCNC: 9.5 MG/DL (ref 8.6–10.5)
CHLORIDE SERPL-SCNC: 96 MMOL/L (ref 98–107)
CO2 SERPL-SCNC: 33 MMOL/L (ref 21–31)
CREAT SERPL-MCNC: 0.82 MG/DL (ref 0.6–1.2)
ERYTHROCYTE [DISTWIDTH] IN BLOOD BY AUTOMATED COUNT: 14.6 % (ref 11.5–14.5)
ERYTHROCYTE [SEDIMENTATION RATE] IN BLOOD: 8 MM/HOUR (ref 0–30)
GFR SERPL CREATININE-BSD FRML MDRD: 67 ML/MIN/1.73SQ M
GLUCOSE SERPL-MCNC: 239 MG/DL (ref 65–99)
HCT VFR BLD AUTO: 43 % (ref 42–47)
HGB BLD-MCNC: 13.8 G/DL (ref 12–16)
LYMPHOCYTES # BLD AUTO: 0.42 THOUSAND/UL (ref 0.6–4.47)
LYMPHOCYTES # BLD AUTO: 3 % (ref 20–51)
MCH RBC QN AUTO: 28.3 PG (ref 26–34)
MCHC RBC AUTO-ENTMCNC: 32 G/DL (ref 31–37)
MCV RBC AUTO: 89 FL (ref 81–99)
MONOCYTES # BLD AUTO: 0.28 THOUSAND/UL (ref 0–1.22)
MONOCYTES NFR BLD AUTO: 2 % (ref 4–12)
NEUTS BAND NFR BLD MANUAL: 9 % (ref 0–8)
NEUTS SEG # BLD: 13.3 THOUSAND/UL (ref 1.81–6.82)
NEUTS SEG NFR BLD AUTO: 86 % (ref 42–75)
PLATELET # BLD AUTO: 184 THOUSANDS/UL (ref 149–390)
PLATELET BLD QL SMEAR: ADEQUATE
PMV BLD AUTO: 7.3 FL (ref 8.6–11.7)
POTASSIUM SERPL-SCNC: 4.6 MMOL/L (ref 3.5–5.5)
RBC # BLD AUTO: 4.86 MILLION/UL (ref 3.9–5.2)
RBC MORPH BLD: NORMAL
SODIUM SERPL-SCNC: 136 MMOL/L (ref 134–143)
TOTAL CELLS COUNTED SPEC: 100
WBC # BLD AUTO: 14 THOUSAND/UL (ref 4.8–10.8)

## 2019-09-29 PROCEDURE — 96375 TX/PRO/DX INJ NEW DRUG ADDON: CPT

## 2019-09-29 PROCEDURE — 85007 BL SMEAR W/DIFF WBC COUNT: CPT | Performed by: FAMILY MEDICINE

## 2019-09-29 PROCEDURE — 80048 BASIC METABOLIC PNL TOTAL CA: CPT | Performed by: FAMILY MEDICINE

## 2019-09-29 PROCEDURE — 96374 THER/PROPH/DIAG INJ IV PUSH: CPT

## 2019-09-29 PROCEDURE — 85652 RBC SED RATE AUTOMATED: CPT | Performed by: FAMILY MEDICINE

## 2019-09-29 PROCEDURE — 85027 COMPLETE CBC AUTOMATED: CPT | Performed by: FAMILY MEDICINE

## 2019-09-29 PROCEDURE — 73080 X-RAY EXAM OF ELBOW: CPT

## 2019-09-29 PROCEDURE — 36415 COLL VENOUS BLD VENIPUNCTURE: CPT | Performed by: FAMILY MEDICINE

## 2019-09-29 PROCEDURE — 99284 EMERGENCY DEPT VISIT MOD MDM: CPT

## 2019-09-29 RX ORDER — HYDROCODONE BITARTRATE AND ACETAMINOPHEN 5; 325 MG/1; MG/1
1 TABLET ORAL EVERY 6 HOURS PRN
Qty: 5 TABLET | Refills: 0 | Status: SHIPPED | OUTPATIENT
Start: 2019-09-29 | End: 2019-10-04 | Stop reason: HOSPADM

## 2019-09-29 RX ORDER — DEXAMETHASONE SODIUM PHOSPHATE 4 MG/ML
10 INJECTION, SOLUTION INTRA-ARTICULAR; INTRALESIONAL; INTRAMUSCULAR; INTRAVENOUS; SOFT TISSUE ONCE
Status: COMPLETED | OUTPATIENT
Start: 2019-09-29 | End: 2019-09-29

## 2019-09-29 RX ORDER — KETOROLAC TROMETHAMINE 30 MG/ML
15 INJECTION, SOLUTION INTRAMUSCULAR; INTRAVENOUS ONCE
Status: COMPLETED | OUTPATIENT
Start: 2019-09-29 | End: 2019-09-29

## 2019-09-29 RX ORDER — METHYLPREDNISOLONE 4 MG/1
TABLET ORAL
Qty: 21 TABLET | Refills: 0 | Status: SHIPPED | OUTPATIENT
Start: 2019-09-29 | End: 2019-10-08 | Stop reason: HOSPADM

## 2019-09-29 RX ORDER — CEPHALEXIN 500 MG/1
500 CAPSULE ORAL EVERY 6 HOURS SCHEDULED
Qty: 28 CAPSULE | Refills: 0 | Status: SHIPPED | OUTPATIENT
Start: 2019-09-29 | End: 2019-10-04 | Stop reason: HOSPADM

## 2019-09-29 RX ORDER — MORPHINE SULFATE 4 MG/ML
4 INJECTION, SOLUTION INTRAMUSCULAR; INTRAVENOUS ONCE
Status: COMPLETED | OUTPATIENT
Start: 2019-09-29 | End: 2019-09-29

## 2019-09-29 RX ADMIN — MORPHINE SULFATE 4 MG: 4 INJECTION INTRAVENOUS at 12:00

## 2019-09-29 RX ADMIN — DEXAMETHASONE SODIUM PHOSPHATE 10 MG: 4 INJECTION, SOLUTION INTRAMUSCULAR; INTRAVENOUS at 10:54

## 2019-09-29 RX ADMIN — KETOROLAC TROMETHAMINE 15 MG: 30 INJECTION, SOLUTION INTRAMUSCULAR; INTRAVENOUS at 10:55

## 2019-09-29 NOTE — ED PROVIDER NOTES
History  Chief Complaint   Patient presents with    Back Pain     left side, one week ago no injury, motrin and biofreeze     Elbow Swelling     left elbow, had xrays two days ago        History provided by:  Patient   used: No     This is a 82-year-old the female presented to ED with complain of lower back and left elbow pain that has been going on for past 3 days  Patient states that she thinks that she twisted her back 2 days ago when she was rolling out of bed currently rating her pain 10/10 at this time she denies any numbness or tingling any urinary retention or incontinence  Patient states that she took Tylenol No   3 at home without any improvement  She also complain of left elbow pain she has a recurrent history of bursitis and had the drain multiple time  Patient states she with see her PCP 3 days ago and had the elbow x-ray done  X-ray shows questionable osteomyelitis with the fracture patient is insisting that her elbow should be drained  Patient states she does not want any antibiotics because it never works  Prior to Admission Medications   Prescriptions Last Dose Informant Patient Reported? Taking?    ALPRAZolam (XANAX) 0 5 mg tablet   Yes No   Sig: Take 0 5 mg by mouth daily at bedtime as needed   Albuterol Sulfate 108 (90 Base) MCG/ACT AEPB   Yes No   Sig: Inhale 2 puffs 3 (three) times a day as needed   Calcium Carb-Cholecalciferol (CALCIUM 1000 + D PO)   Yes No   Sig: Take 1,000 mg by mouth daily   aspirin (ECOTRIN LOW STRENGTH) 81 mg EC tablet   Yes No   Sig: Take 81 mg by mouth Daily   famotidine (PEPCID) 40 MG tablet   Yes No   Sig: Take 40 mg by mouth as needed    fluticasone-umeclidinium-vilanterol (TRELEGY ELLIPTA) 100-62 5-25 MCG/INH inhaler   Yes Yes   Si puff daily   furosemide (LASIX) 20 mg tablet  Self Yes No   Sig: Take 20 mg by mouth daily   ipratropium-albuterol (COMBIVENT RESPIMAT) inhaler   Yes No   Sig: Every 6 hours   loperamide (IMODIUM) 2 mg capsule   No No   Sig: Take 1 capsule (2 mg total) by mouth 4 (four) times a day as needed for diarrhea   montelukast (SINGULAIR) 10 mg tablet  Self Yes No   Sig: Take 10 mg by mouth daily at bedtime   potassium chloride (MICRO-K) 10 MEQ CR capsule  Self Yes No   Sig: Take 10 mEq by mouth daily      Facility-Administered Medications: None       Past Medical History:   Diagnosis Date    Asthma     Blurred vision     Cardiac disease     [facility]     Diverticulosis     Emphysema lung ([facility] )        Past Surgical History:   Procedure Laterality Date    COLON SURGERY      COLONOSCOPY W/ POLYPECTOMY N/A 2019    Procedure: COLONOSCOPY W/ POLYPECTOMY;  Surgeon: Mitch Canavan, MD;  Location: [facility] GI [facility]; Service: General       History reviewed  No pertinent family history  I have reviewed and agree with the history as documented  Social History     Tobacco Use    Smoking status: Former Smoker     Last attempt to quit: 2013     Years since quittin 8    Smokeless tobacco: Never Used   Substance Use Topics    Alcohol use: No    Drug use: No        Review of Systems   Constitutional: Negative  HENT: Negative  Respiratory: Negative  Cardiovascular: Negative  Gastrointestinal: Negative  Musculoskeletal: Positive for back pain (left elbow swelling and pain) and joint swelling  Physical Exam  Physical Exam   Constitutional: She appears well-developed and well-nourished  HENT:   Head: Normocephalic and atraumatic  Neck: Normal range of motion  Neck supple  Cardiovascular: Normal rate, regular rhythm and normal heart sounds  Pulmonary/Chest: Effort normal and breath sounds normal    Musculoskeletal: She exhibits tenderness (  Left elbow hot warm joint tender to touch swollen with surrounding erythema is present  )  Mild tenderness to palpation at the lower mid back no step-offs noted   Nursing note and vitals reviewed        Vital Signs  ED Triage Vitals   Temperature Pulse Respirations Blood Pressure SpO2   09/29/19 1006 09/29/19 1006 09/29/19 1006 09/29/19 1006 09/29/19 1008   97 7 °F (36 5 °C) 98 16 151/67 95 %      Temp Source Heart Rate Source Patient Position - Orthostatic VS BP Location FiO2 (%)   09/29/19 1006 09/29/19 1006 -- -- --   Temporal Monitor         Pain Score       09/29/19 1006       Worst Possible Pain           Vitals:    09/29/19 1006   BP: 151/67   Pulse: 98         Visual Acuity  Visual Acuity      Most Recent Value   L Pupil Size (mm)  3   R Pupil Size (mm)  3          ED Medications  Medications   dexamethasone (DECADRON) injection 10 mg (10 mg Intravenous Given 9/29/19 1054)   ketorolac (TORADOL) injection 15 mg (15 mg Intravenous Given 9/29/19 1055)   morphine (PF) 4 mg/mL injection 4 mg (4 mg Intravenous Given 9/29/19 1200)       Diagnostic Studies  Results Reviewed     Procedure Component Value Units Date/Time    Sedimentation rate, automated [[id number]]  (Normal) Collected:  09/29/19 1050    Lab Status:  Final result Specimen:  Blood from Arm, Right Updated:  09/29/19 1147     Sed Rate 8 mm/hour     Basic metabolic panel [[id number]]  (Abnormal) Collected:  09/29/19 1050    Lab Status:  Final result Specimen:  Blood from Arm, Right Updated:  09/29/19 1123     Sodium 136 mmol/L      Potassium 4 6 mmol/L      Chloride 96 mmol/L      CO2 33 mmol/L      ANION GAP 7 mmol/L      BUN 24 mg/dL      Creatinine 0 82 mg/dL      Glucose 239 mg/dL      Calcium 9 5 mg/dL      eGFR 67 ml/min/1 73sq m     Narrative:       Kaylyn guidelines for Chronic Kidney Disease (CKD):     Stage 1 with normal or high GFR (GFR > 90 mL/min/1 73 square meters)    Stage 2 Mild CKD (GFR = 60-89 mL/min/1 73 square meters)    Stage 3A Moderate CKD (GFR = 45-59 mL/min/1 73 square meters)    Stage 3B Moderate CKD (GFR = 30-44 mL/min/1 73 square meters)    Stage 4 Severe CKD (GFR = 15-29 mL/min/1 73 square meters)   Stage 5 End Stage CKD (GFR <15 mL/min/1 73 square meters)  Note: GFR calculation is accurate only with a steady state creatinine    CBC and differential [[id number]]  (Abnormal) Collected:  09/29/19 1050    Lab Status:  Final result Specimen:  Blood from Arm, Right Updated:  09/29/19 1104     WBC 14 00 Thousand/uL      RBC 4 86 Million/uL      Hemoglobin 13 8 g/dL      Hematocrit 43 0 %      MCV 89 fL      MCH 28 3 pg      MCHC 32 0 g/dL      RDW 14 6 %      MPV 7 3 fL      Platelets 974 Thousands/uL                  XR elbow 3+ vw left    (Results Pending)              Procedures  Procedures       ED Course  ED Course as of Sep 29 1237   Sun Sep 29, 2019   1124 Case is discussed with Dr Bautista recommending to obtain blood work and repeat x-ray may need to drain the elbow  1230 Case is discussed with Dr Bautista who has viewed the elbow x-ray states that he does not see fracture patient does have bursitis no effusion is noted recommending to start her on antibiotics and follow up in the clinic as an outpatient  patient states that her back pain is much better recommended to continue with antibiotics at home return to the ED symptoms are worse  MDM    Disposition  Final diagnoses:   Olecranon bursitis of left elbow   Back pain     Time reflects when diagnosis was documented in both MDM as applicable and the Disposition within this note     Time User Action Codes Description Comment    9/29/2019 12:30 PM Erika Uribe [M70 22] Olecranon bursitis of left elbow     9/29/2019 12:33 PM Erika Malave Add [M54 9] Back pain       ED Disposition     ED Disposition Condition Date/Time Comment    Discharge Stable Sun Sep 29, 2019 12:30 PM [address] discharge to home/self care              Follow-up Information     Follow up With Specialties Details Why Adolfo Go MD Internal Medicine Schedule an appointment as soon as possible for a visit in 2 days If symptoms worsen [address] [facility] [id number]  [phone]      Danuta Arthur MD Orthopedic Surgery Schedule an appointment as soon as possible for a visit in 2 days If symptoms worsen [id number] [facility] [address]  [address] [facility]            Patient's Medications   Discharge Prescriptions    CEPHALEXIN (KEFLEX) 500 MG CAPSULE    Take 1 capsule (500 mg total) by mouth every 6 (six) hours for 7 days       Start Date: 9/29/2019 End Date: 10/6/2019       Order Dose: 500 mg       Quantity: 28 capsule    Refills: 0    HYDROCODONE-ACETAMINOPHEN (NORCO) 5-325 MG PER TABLET    Take 1 tablet by mouth every 6 (six) hours as needed for pain for up to 2 daysMax Daily Amount: 4 tablets       Start Date: 9/29/2019 End Date: 10/1/2019       Order Dose: 1 tablet       Quantity: 5 tablet    Refills: 0    METHYLPREDNISOLONE 4 MG TABLET THERAPY PACK    Use as directed on package       Start Date: 9/29/2019 End Date: --       Order Dose: --       Quantity: 21 tablet    Refills: 0     No discharge procedures on file      ED Provider  Electronically Signed by           Rosita Guerra MD  09/29/19 Tonia Gamez MD  09/29/19 [id number]

## 2019-10-02 ENCOUNTER — HOSPITAL ENCOUNTER (INPATIENT)
Facility: HOSPITAL | Age: 84
LOS: 1 days | Discharge: HOME | DRG: 552 | End: 2019-10-04
Attending: INTERNAL MEDICINE | Admitting: INTERNAL MEDICINE
Payer: MEDICARE

## 2019-10-02 ENCOUNTER — APPOINTMENT (EMERGENCY)
Dept: CT IMAGING | Facility: HOSPITAL | Age: 84
DRG: 552 | End: 2019-10-02
Payer: MEDICARE

## 2019-10-02 DIAGNOSIS — G89.29 CHRONIC LOW BACK PAIN WITH SCIATICA, SCIATICA LATERALITY UNSPECIFIED, UNSPECIFIED BACK PAIN LATERALITY: ICD-10-CM

## 2019-10-02 DIAGNOSIS — M54.40 CHRONIC LOW BACK PAIN WITH SCIATICA, SCIATICA LATERALITY UNSPECIFIED, UNSPECIFIED BACK PAIN LATERALITY: ICD-10-CM

## 2019-10-02 DIAGNOSIS — M25.422 EFFUSION OF BURSA OF LEFT ELBOW: ICD-10-CM

## 2019-10-02 DIAGNOSIS — K52.9 COLITIS: Primary | ICD-10-CM

## 2019-10-02 LAB
ALBUMIN SERPL BCP-MCNC: 3.8 G/DL (ref 3.5–5.7)
ALP SERPL-CCNC: 50 U/L (ref 55–165)
ALT SERPL W P-5'-P-CCNC: 24 U/L (ref 7–52)
ANION GAP SERPL CALCULATED.3IONS-SCNC: 10 MMOL/L (ref 4–13)
AST SERPL W P-5'-P-CCNC: 12 U/L (ref 13–39)
BASOPHILS # BLD AUTO: 0 THOUSANDS/ΜL (ref 0–0.1)
BASOPHILS NFR BLD AUTO: 0 % (ref 0–2)
BILIRUB SERPL-MCNC: 0.4 MG/DL (ref 0.2–1)
BILIRUB UR QL STRIP: NEGATIVE
BUN SERPL-MCNC: 18 MG/DL (ref 7–25)
CALCIUM SERPL-MCNC: 8.3 MG/DL (ref 8.6–10.5)
CHLORIDE SERPL-SCNC: 102 MMOL/L (ref 98–107)
CLARITY UR: CLEAR
CO2 SERPL-SCNC: 29 MMOL/L (ref 21–31)
COLOR UR: YELLOW
CREAT SERPL-MCNC: 0.69 MG/DL (ref 0.6–1.2)
EOSINOPHIL # BLD AUTO: 0 THOUSAND/ΜL (ref 0–0.61)
EOSINOPHIL NFR BLD AUTO: 0 % (ref 0–5)
ERYTHROCYTE [DISTWIDTH] IN BLOOD BY AUTOMATED COUNT: 14.5 % (ref 11.5–14.5)
GFR SERPL CREATININE-BSD FRML MDRD: 81 ML/MIN/1.73SQ M
GLUCOSE SERPL-MCNC: 150 MG/DL (ref 65–99)
GLUCOSE UR STRIP-MCNC: NEGATIVE MG/DL
HCT VFR BLD AUTO: 42.6 % (ref 42–47)
HGB BLD-MCNC: 13.8 G/DL (ref 12–16)
HGB UR QL STRIP.AUTO: NEGATIVE
KETONES UR STRIP-MCNC: NEGATIVE MG/DL
LEUKOCYTE ESTERASE UR QL STRIP: NEGATIVE
LIPASE SERPL-CCNC: <10 U/L (ref 11–82)
LYMPHOCYTES # BLD AUTO: 1.3 THOUSANDS/ΜL (ref 0.6–4.47)
LYMPHOCYTES NFR BLD AUTO: 12 % (ref 21–51)
MCH RBC QN AUTO: 28.4 PG (ref 26–34)
MCHC RBC AUTO-ENTMCNC: 32.5 G/DL (ref 31–37)
MCV RBC AUTO: 88 FL (ref 81–99)
MONOCYTES # BLD AUTO: 1 THOUSAND/ΜL (ref 0.17–1.22)
MONOCYTES NFR BLD AUTO: 9 % (ref 2–12)
NEUTROPHILS # BLD AUTO: 8.6 THOUSANDS/ΜL (ref 1.4–6.5)
NEUTS SEG NFR BLD AUTO: 79 % (ref 42–75)
NITRITE UR QL STRIP: NEGATIVE
PH UR STRIP.AUTO: 5.5 [PH]
PLATELET # BLD AUTO: 264 THOUSANDS/UL (ref 149–390)
PMV BLD AUTO: 7.2 FL (ref 8.6–11.7)
POTASSIUM SERPL-SCNC: 3.9 MMOL/L (ref 3.5–5.5)
PROT SERPL-MCNC: 6.4 G/DL (ref 6.4–8.9)
PROT UR STRIP-MCNC: NEGATIVE MG/DL
RBC # BLD AUTO: 4.87 MILLION/UL (ref 3.9–5.2)
SODIUM SERPL-SCNC: 141 MMOL/L (ref 134–143)
SP GR UR STRIP.AUTO: <=1.005 (ref 1–1.03)
TROPONIN I SERPL-MCNC: <0.03 NG/ML
TSH SERPL DL<=0.05 MIU/L-ACNC: 2.62 UIU/ML (ref 0.45–5.33)
UROBILINOGEN UR QL STRIP.AUTO: 0.2 E.U./DL
WBC # BLD AUTO: 10.9 THOUSAND/UL (ref 4.8–10.8)

## 2019-10-02 PROCEDURE — 84484 ASSAY OF TROPONIN QUANT: CPT | Performed by: INTERNAL MEDICINE

## 2019-10-02 PROCEDURE — 81003 URINALYSIS AUTO W/O SCOPE: CPT | Performed by: INTERNAL MEDICINE

## 2019-10-02 PROCEDURE — 80053 COMPREHEN METABOLIC PANEL: CPT | Performed by: INTERNAL MEDICINE

## 2019-10-02 PROCEDURE — 74176 CT ABD & PELVIS W/O CONTRAST: CPT

## 2019-10-02 PROCEDURE — 85025 COMPLETE CBC W/AUTO DIFF WBC: CPT | Performed by: INTERNAL MEDICINE

## 2019-10-02 PROCEDURE — 84443 ASSAY THYROID STIM HORMONE: CPT | Performed by: INTERNAL MEDICINE

## 2019-10-02 PROCEDURE — 99285 EMERGENCY DEPT VISIT HI MDM: CPT

## 2019-10-02 PROCEDURE — 93005 ELECTROCARDIOGRAM TRACING: CPT

## 2019-10-02 PROCEDURE — 36415 COLL VENOUS BLD VENIPUNCTURE: CPT | Performed by: INTERNAL MEDICINE

## 2019-10-02 PROCEDURE — 83690 ASSAY OF LIPASE: CPT | Performed by: INTERNAL MEDICINE

## 2019-10-03 PROBLEM — K52.9 COLITIS PRESUMED INFECTIOUS: Status: ACTIVE | Noted: 2019-10-03

## 2019-10-03 PROBLEM — M25.422 EFFUSION OF BURSA OF LEFT ELBOW: Status: ACTIVE | Noted: 2019-09-23

## 2019-10-03 PROBLEM — G89.29 CHRONIC LOW BACK PAIN: Status: ACTIVE | Noted: 2018-08-13

## 2019-10-03 PROBLEM — M54.16 LUMBAR RADICULOPATHY: Status: ACTIVE | Noted: 2018-08-13

## 2019-10-03 PROBLEM — J30.2 SEASONAL ALLERGIES: Status: ACTIVE | Noted: 2018-05-02

## 2019-10-03 PROBLEM — F41.0 PANIC ATTACK: Status: ACTIVE | Noted: 2018-01-23

## 2019-10-03 PROBLEM — K21.9 GASTROESOPHAGEAL REFLUX DISEASE WITHOUT ESOPHAGITIS: Status: ACTIVE | Noted: 2018-03-28

## 2019-10-03 PROBLEM — Z87.898 HISTORY OF ANGIOEDEMA: Status: ACTIVE | Noted: 2017-11-18

## 2019-10-03 PROBLEM — K57.30 DIVERTICULOSIS OF COLON: Status: ACTIVE | Noted: 2019-01-21

## 2019-10-03 PROBLEM — J34.2 DEVIATED NASAL SEPTUM: Status: ACTIVE | Noted: 2017-10-20

## 2019-10-03 PROBLEM — M54.50 CHRONIC LOW BACK PAIN: Status: ACTIVE | Noted: 2018-08-13

## 2019-10-03 PROBLEM — I51.7 CARDIOMEGALY: Status: ACTIVE | Noted: 2018-01-23

## 2019-10-03 PROBLEM — Z86.010 HISTORY OF COLONIC POLYPS: Status: ACTIVE | Noted: 2019-01-21

## 2019-10-03 PROBLEM — F41.9 CHRONIC ANXIETY: Status: ACTIVE | Noted: 2018-01-23

## 2019-10-03 PROBLEM — M81.0 OSTEOPOROSIS: Status: ACTIVE | Noted: 2018-03-08

## 2019-10-03 PROBLEM — K92.2 LOWER GASTROINTESTINAL HEMORRHAGE: Status: ACTIVE | Noted: 2019-01-21

## 2019-10-03 PROBLEM — I50.9 CONGESTIVE HEART FAILURE (HCC): Status: ACTIVE | Noted: 2018-01-23

## 2019-10-03 PROBLEM — H35.30 MACULAR DEGENERATION OF BOTH EYES: Status: ACTIVE | Noted: 2019-04-18

## 2019-10-03 PROBLEM — J38.3 VOCAL CORD DYSFUNCTION: Status: ACTIVE | Noted: 2017-10-20

## 2019-10-03 PROBLEM — E66.9 OBESITY: Status: ACTIVE | Noted: 2018-02-28

## 2019-10-03 LAB
ANION GAP SERPL CALCULATED.3IONS-SCNC: 8 MMOL/L (ref 4–13)
ATRIAL RATE: 60 BPM
ATRIAL RATE: 72 BPM
BASOPHILS # BLD AUTO: 0 THOUSANDS/ΜL (ref 0–0.1)
BASOPHILS NFR BLD AUTO: 0 % (ref 0–2)
BUN SERPL-MCNC: 16 MG/DL (ref 7–25)
CALCIUM SERPL-MCNC: 7.9 MG/DL (ref 8.6–10.5)
CHLORIDE SERPL-SCNC: 105 MMOL/L (ref 98–107)
CO2 SERPL-SCNC: 25 MMOL/L (ref 21–31)
CREAT SERPL-MCNC: 0.57 MG/DL (ref 0.6–1.2)
EOSINOPHIL # BLD AUTO: 0.1 THOUSAND/ΜL (ref 0–0.61)
EOSINOPHIL NFR BLD AUTO: 1 % (ref 0–5)
ERYTHROCYTE [DISTWIDTH] IN BLOOD BY AUTOMATED COUNT: 14.6 % (ref 11.5–14.5)
GFR SERPL CREATININE-BSD FRML MDRD: 87 ML/MIN/1.73SQ M
GLUCOSE SERPL-MCNC: 122 MG/DL (ref 65–99)
HCT VFR BLD AUTO: 40.5 % (ref 42–47)
HGB BLD-MCNC: 13.1 G/DL (ref 12–16)
LYMPHOCYTES # BLD AUTO: 1.3 THOUSANDS/ΜL (ref 0.6–4.47)
LYMPHOCYTES NFR BLD AUTO: 16 % (ref 21–51)
MCH RBC QN AUTO: 28.4 PG (ref 26–34)
MCHC RBC AUTO-ENTMCNC: 32.4 G/DL (ref 31–37)
MCV RBC AUTO: 88 FL (ref 81–99)
MONOCYTES # BLD AUTO: 0.8 THOUSAND/ΜL (ref 0.17–1.22)
MONOCYTES NFR BLD AUTO: 10 % (ref 2–12)
NEUTROPHILS # BLD AUTO: 5.9 THOUSANDS/ΜL (ref 1.4–6.5)
NEUTS SEG NFR BLD AUTO: 73 % (ref 42–75)
P AXIS: 85 DEGREES
P AXIS: 86 DEGREES
PLATELET # BLD AUTO: 247 THOUSANDS/UL (ref 149–390)
PMV BLD AUTO: 7.5 FL (ref 8.6–11.7)
POTASSIUM SERPL-SCNC: 3.6 MMOL/L (ref 3.5–5.5)
PR INTERVAL: 120 MS
PR INTERVAL: 134 MS
PROCALCITONIN SERPL-MCNC: <0.05 NG/ML
QRS AXIS: -21 DEGREES
QRS AXIS: -52 DEGREES
QRSD INTERVAL: 88 MS
QRSD INTERVAL: 90 MS
QT INTERVAL: 338 MS
QT INTERVAL: 350 MS
QTC INTERVAL: 431 MS
QTC INTERVAL: 444 MS
RBC # BLD AUTO: 4.63 MILLION/UL (ref 3.9–5.2)
SODIUM SERPL-SCNC: 138 MMOL/L (ref 134–143)
T WAVE AXIS: 66 DEGREES
T WAVE AXIS: 71 DEGREES
VENTRICULAR RATE: 97 BPM
VENTRICULAR RATE: 98 BPM
WBC # BLD AUTO: 8.2 THOUSAND/UL (ref 4.8–10.8)
WBC STL QL MICRO: NORMAL

## 2019-10-03 PROCEDURE — 97163 PT EVAL HIGH COMPLEX 45 MIN: CPT

## 2019-10-03 PROCEDURE — 87505 NFCT AGENT DETECTION GI: CPT | Performed by: PHYSICIAN ASSISTANT

## 2019-10-03 PROCEDURE — 87205 SMEAR GRAM STAIN: CPT | Performed by: PHYSICIAN ASSISTANT

## 2019-10-03 PROCEDURE — 97166 OT EVAL MOD COMPLEX 45 MIN: CPT

## 2019-10-03 PROCEDURE — 94760 N-INVAS EAR/PLS OXIMETRY 1: CPT

## 2019-10-03 PROCEDURE — 80048 BASIC METABOLIC PNL TOTAL CA: CPT | Performed by: PHYSICIAN ASSISTANT

## 2019-10-03 PROCEDURE — 84145 PROCALCITONIN (PCT): CPT | Performed by: HOSPITALIST

## 2019-10-03 PROCEDURE — 93010 ELECTROCARDIOGRAM REPORT: CPT | Performed by: INTERNAL MEDICINE

## 2019-10-03 PROCEDURE — 94664 DEMO&/EVAL PT USE INHALER: CPT

## 2019-10-03 PROCEDURE — 87493 C DIFF AMPLIFIED PROBE: CPT | Performed by: PHYSICIAN ASSISTANT

## 2019-10-03 PROCEDURE — 93005 ELECTROCARDIOGRAM TRACING: CPT

## 2019-10-03 PROCEDURE — G8979 MOBILITY GOAL STATUS: HCPCS

## 2019-10-03 PROCEDURE — 99223 1ST HOSP IP/OBS HIGH 75: CPT | Performed by: HOSPITALIST

## 2019-10-03 PROCEDURE — G8987 SELF CARE CURRENT STATUS: HCPCS

## 2019-10-03 PROCEDURE — G8978 MOBILITY CURRENT STATUS: HCPCS

## 2019-10-03 PROCEDURE — 94640 AIRWAY INHALATION TREATMENT: CPT

## 2019-10-03 PROCEDURE — G8988 SELF CARE GOAL STATUS: HCPCS

## 2019-10-03 PROCEDURE — 85025 COMPLETE CBC W/AUTO DIFF WBC: CPT | Performed by: PHYSICIAN ASSISTANT

## 2019-10-03 RX ORDER — METHYLPREDNISOLONE 4 MG/1
24 TABLET ORAL DAILY
Status: COMPLETED | OUTPATIENT
Start: 2019-10-03 | End: 2019-10-03

## 2019-10-03 RX ORDER — METHYLPREDNISOLONE 4 MG/1
4 TABLET ORAL DAILY
Status: DISCONTINUED | OUTPATIENT
Start: 2019-10-08 | End: 2019-10-04 | Stop reason: HOSPADM

## 2019-10-03 RX ORDER — B-COMPLEX WITH VITAMIN C
2 TABLET ORAL
Status: DISCONTINUED | OUTPATIENT
Start: 2019-10-03 | End: 2019-10-04 | Stop reason: HOSPADM

## 2019-10-03 RX ORDER — METHYLPREDNISOLONE 4 MG/1
8 TABLET ORAL DAILY
Status: DISCONTINUED | OUTPATIENT
Start: 2019-10-07 | End: 2019-10-04 | Stop reason: HOSPADM

## 2019-10-03 RX ORDER — MONTELUKAST SODIUM 10 MG/1
10 TABLET ORAL
Status: DISCONTINUED | OUTPATIENT
Start: 2019-10-03 | End: 2019-10-04 | Stop reason: HOSPADM

## 2019-10-03 RX ORDER — IPRATROPIUM BROMIDE AND ALBUTEROL SULFATE 2.5; .5 MG/3ML; MG/3ML
3 SOLUTION RESPIRATORY (INHALATION)
Status: DISCONTINUED | OUTPATIENT
Start: 2019-10-03 | End: 2019-10-03

## 2019-10-03 RX ORDER — FUROSEMIDE 20 MG/1
20 TABLET ORAL DAILY
Status: DISCONTINUED | OUTPATIENT
Start: 2019-10-03 | End: 2019-10-04 | Stop reason: HOSPADM

## 2019-10-03 RX ORDER — METHYLPREDNISOLONE 4 MG/1
16 TABLET ORAL DAILY
Status: DISCONTINUED | OUTPATIENT
Start: 2019-10-05 | End: 2019-10-03

## 2019-10-03 RX ORDER — FAMOTIDINE 20 MG/1
20 TABLET, FILM COATED ORAL AS NEEDED
Status: DISCONTINUED | OUTPATIENT
Start: 2019-10-03 | End: 2019-10-04 | Stop reason: HOSPADM

## 2019-10-03 RX ORDER — SODIUM CHLORIDE 9 MG/ML
100 INJECTION, SOLUTION INTRAVENOUS CONTINUOUS
Status: DISCONTINUED | OUTPATIENT
Start: 2019-10-03 | End: 2019-10-04

## 2019-10-03 RX ORDER — ONDANSETRON 2 MG/ML
4 INJECTION INTRAMUSCULAR; INTRAVENOUS EVERY 6 HOURS PRN
Status: DISCONTINUED | OUTPATIENT
Start: 2019-10-03 | End: 2019-10-04 | Stop reason: HOSPADM

## 2019-10-03 RX ORDER — METHYLPREDNISOLONE 4 MG/1
12 TABLET ORAL DAILY
Status: DISCONTINUED | OUTPATIENT
Start: 2019-10-06 | End: 2019-10-04 | Stop reason: HOSPADM

## 2019-10-03 RX ORDER — POTASSIUM CHLORIDE 20MEQ/15ML
20 LIQUID (ML) ORAL DAILY
Status: DISCONTINUED | OUTPATIENT
Start: 2019-10-03 | End: 2019-10-04 | Stop reason: HOSPADM

## 2019-10-03 RX ORDER — FLUTICASONE FUROATE AND VILANTEROL 100; 25 UG/1; UG/1
1 POWDER RESPIRATORY (INHALATION) DAILY
Status: DISCONTINUED | OUTPATIENT
Start: 2019-10-03 | End: 2019-10-04 | Stop reason: HOSPADM

## 2019-10-03 RX ORDER — IPRATROPIUM BROMIDE AND ALBUTEROL SULFATE 2.5; .5 MG/3ML; MG/3ML
3 SOLUTION RESPIRATORY (INHALATION)
Status: DISCONTINUED | OUTPATIENT
Start: 2019-10-03 | End: 2019-10-04 | Stop reason: HOSPADM

## 2019-10-03 RX ORDER — ALENDRONATE SODIUM 70 MG/1
70 TABLET ORAL
Status: ON HOLD | COMMUNITY
End: 2019-11-04 | Stop reason: SDUPTHER

## 2019-10-03 RX ORDER — METHYLPREDNISOLONE 4 MG/1
4 TABLET ORAL DAILY
Status: DISCONTINUED | OUTPATIENT
Start: 2019-10-08 | End: 2019-10-03

## 2019-10-03 RX ORDER — ACETAMINOPHEN 325 MG/1
650 TABLET ORAL EVERY 6 HOURS PRN
Status: DISCONTINUED | OUTPATIENT
Start: 2019-10-03 | End: 2019-10-04 | Stop reason: HOSPADM

## 2019-10-03 RX ORDER — CEPHALEXIN 500 MG/1
500 CAPSULE ORAL EVERY 8 HOURS SCHEDULED
Status: DISCONTINUED | OUTPATIENT
Start: 2019-10-03 | End: 2019-10-03

## 2019-10-03 RX ORDER — HYDROCODONE BITARTRATE AND ACETAMINOPHEN 5; 325 MG/1; MG/1
1 TABLET ORAL EVERY 6 HOURS PRN
Status: DISCONTINUED | OUTPATIENT
Start: 2019-10-03 | End: 2019-10-04 | Stop reason: HOSPADM

## 2019-10-03 RX ORDER — HEPARIN SODIUM 5000 [USP'U]/ML
5000 INJECTION, SOLUTION INTRAVENOUS; SUBCUTANEOUS EVERY 8 HOURS SCHEDULED
Status: DISCONTINUED | OUTPATIENT
Start: 2019-10-03 | End: 2019-10-04 | Stop reason: HOSPADM

## 2019-10-03 RX ORDER — METHYLPREDNISOLONE 4 MG/1
12 TABLET ORAL DAILY
Status: DISCONTINUED | OUTPATIENT
Start: 2019-10-06 | End: 2019-10-03

## 2019-10-03 RX ORDER — KETOROLAC TROMETHAMINE 30 MG/ML
15 INJECTION, SOLUTION INTRAMUSCULAR; INTRAVENOUS EVERY 6 HOURS PRN
Status: DISCONTINUED | OUTPATIENT
Start: 2019-10-03 | End: 2019-10-04

## 2019-10-03 RX ORDER — ASPIRIN 81 MG/1
81 TABLET ORAL DAILY
Status: DISCONTINUED | OUTPATIENT
Start: 2019-10-03 | End: 2019-10-04 | Stop reason: HOSPADM

## 2019-10-03 RX ORDER — METHYLPREDNISOLONE 4 MG/1
16 TABLET ORAL DAILY
Status: DISCONTINUED | OUTPATIENT
Start: 2019-10-05 | End: 2019-10-04 | Stop reason: HOSPADM

## 2019-10-03 RX ORDER — NITROGLYCERIN 0.4 MG/1
0.4 TABLET SUBLINGUAL
Status: DISCONTINUED | OUTPATIENT
Start: 2019-10-03 | End: 2019-10-04 | Stop reason: HOSPADM

## 2019-10-03 RX ORDER — METHYLPREDNISOLONE 4 MG/1
8 TABLET ORAL DAILY
Status: DISCONTINUED | OUTPATIENT
Start: 2019-10-07 | End: 2019-10-03

## 2019-10-03 RX ORDER — ALPRAZOLAM 0.5 MG/1
0.5 TABLET ORAL
Status: DISCONTINUED | OUTPATIENT
Start: 2019-10-03 | End: 2019-10-04 | Stop reason: HOSPADM

## 2019-10-03 RX ORDER — METHYLPREDNISOLONE 4 MG/1
20 TABLET ORAL DAILY
Status: COMPLETED | OUTPATIENT
Start: 2019-10-04 | End: 2019-10-04

## 2019-10-03 RX ADMIN — FAMOTIDINE 20 MG: 20 TABLET ORAL at 04:08

## 2019-10-03 RX ADMIN — MONTELUKAST SODIUM 10 MG: 10 TABLET, COATED ORAL at 22:14

## 2019-10-03 RX ADMIN — MONTELUKAST SODIUM 10 MG: 10 TABLET, COATED ORAL at 01:24

## 2019-10-03 RX ADMIN — FUROSEMIDE 20 MG: 20 TABLET ORAL at 09:27

## 2019-10-03 RX ADMIN — IPRATROPIUM BROMIDE AND ALBUTEROL SULFATE 3 ML: 2.5; .5 SOLUTION RESPIRATORY (INHALATION) at 20:23

## 2019-10-03 RX ADMIN — HEPARIN SODIUM 5000 UNITS: 5000 INJECTION INTRAVENOUS; SUBCUTANEOUS at 06:18

## 2019-10-03 RX ADMIN — IPRATROPIUM BROMIDE AND ALBUTEROL SULFATE 3 ML: 2.5; .5 SOLUTION RESPIRATORY (INHALATION) at 01:29

## 2019-10-03 RX ADMIN — HYDROCODONE BITARTRATE AND ACETAMINOPHEN 1 TABLET: 5; 325 TABLET ORAL at 08:10

## 2019-10-03 RX ADMIN — IPRATROPIUM BROMIDE AND ALBUTEROL SULFATE 3 ML: 2.5; .5 SOLUTION RESPIRATORY (INHALATION) at 14:05

## 2019-10-03 RX ADMIN — CEPHALEXIN 500 MG: 500 CAPSULE ORAL at 06:17

## 2019-10-03 RX ADMIN — KETOROLAC TROMETHAMINE 15 MG: 30 INJECTION, SOLUTION INTRAMUSCULAR; INTRAVENOUS at 06:17

## 2019-10-03 RX ADMIN — SODIUM CHLORIDE 100 ML/HR: 9 INJECTION, SOLUTION INTRAVENOUS at 12:31

## 2019-10-03 RX ADMIN — HEPARIN SODIUM 5000 UNITS: 5000 INJECTION INTRAVENOUS; SUBCUTANEOUS at 14:39

## 2019-10-03 RX ADMIN — CEPHALEXIN 500 MG: 500 CAPSULE ORAL at 14:39

## 2019-10-03 RX ADMIN — FLUTICASONE FUROATE AND VILANTEROL TRIFENATATE 1 PUFF: 100; 25 POWDER RESPIRATORY (INHALATION) at 09:27

## 2019-10-03 RX ADMIN — FAMOTIDINE 20 MG: 20 TABLET ORAL at 14:39

## 2019-10-03 RX ADMIN — MORPHINE SULFATE 2 MG: 2 INJECTION, SOLUTION INTRAMUSCULAR; INTRAVENOUS at 04:07

## 2019-10-03 RX ADMIN — CALCIUM CARBONATE-VITAMIN D TAB 500 MG-200 UNIT 2 TABLET: 500-200 TAB at 08:02

## 2019-10-03 RX ADMIN — METHYLPREDNISOLONE 24 MG: 4 TABLET ORAL at 09:26

## 2019-10-03 RX ADMIN — IPRATROPIUM BROMIDE AND ALBUTEROL SULFATE 3 ML: 2.5; .5 SOLUTION RESPIRATORY (INHALATION) at 09:00

## 2019-10-03 RX ADMIN — HEPARIN SODIUM 5000 UNITS: 5000 INJECTION INTRAVENOUS; SUBCUTANEOUS at 01:24

## 2019-10-03 RX ADMIN — SODIUM CHLORIDE 100 ML/HR: 9 INJECTION, SOLUTION INTRAVENOUS at 01:24

## 2019-10-03 RX ADMIN — HEPARIN SODIUM 5000 UNITS: 5000 INJECTION INTRAVENOUS; SUBCUTANEOUS at 22:14

## 2019-10-03 RX ADMIN — POTASSIUM CHLORIDE 20 MEQ: 20 SOLUTION ORAL at 09:27

## 2019-10-03 RX ADMIN — ASPIRIN 81 MG: 81 TABLET, COATED ORAL at 09:27

## 2019-10-03 NOTE — ASSESSMENT & PLAN NOTE
· Placed in observation Medicine  · Place on clear liquid diet  · Contact and hand washing isolation precautions  · Send stool for C diff, stool culture, white blood cells  · Consult GI

## 2019-10-03 NOTE — RESPIRATORY THERAPY NOTE
RT Protocol Note  Adonay Chatman 82 y o  female MRN: [id number]  Unit/Bed#: -02 Encounter: [id number]    Assessment    Active Problems:    * No active hospital problems  *      Home Pulmonary Medications:    Home Devices/Therapy: (P) Home O2, Other (Comment)(Minineb 4x's daily Albuterol/5lpm 02 H  S (pt  states))    Past Medical History:   Diagnosis Date    Asthma     Blurred vision     Cardiac disease     [facility]     Diverticulosis     Emphysema lung ([address] )      Social History     Socioeconomic History    Marital status:      Spouse name: None    Number of children: None    Years of education: None    Highest education level: None   Occupational History    None   Social Needs    Financial resource strain: None    Food insecurity:     Worry: None     Inability: None    Transportation needs:     Medical: None     Non-medical: None   Tobacco Use    Smoking status: Former Smoker     Last attempt to quit: 2013     Years since quittin 8    Smokeless tobacco: Never Used   Substance and Sexual Activity    Alcohol use: No     Frequency: Never     Binge frequency: Never    Drug use: No    Sexual activity: None   Lifestyle    Physical activity:     Days per week: None     Minutes per session: None    Stress: None   Relationships    Social connections:     Talks on phone: None     Gets together: None     Attends [employer] service: None     Active member of club or organization: None     Attends meetings of clubs or organizations: None     Relationship status: None    Intimate partner violence:     Fear of current or ex partner: None     Emotionally abused: None     Physically abused: None     Forced sexual activity: None   Other Topics Concern    None   Social History Narrative    None       Subjective Tx's scheduled by PAJASKARAN-Pt  Is S O B  At rest  Therapy to be given as ordered           Objective    Physical Exam:   Assessment Type: (P) Post-treatment  General Appearance: (P) Drowsy  Respiratory Pattern: (P) Dyspnea with exertion  Chest Assessment: (P) Chest expansion symmetrical  Bilateral Breath Sounds: (P) Diminished, Rales(few Faint rales)    Vitals:  Blood pressure 111/57, pulse 70, temperature 97 6 °F (36 4 °C), temperature source Temporal, resp  rate 16, height 5' (1 524 m), weight 77 6 kg (171 lb 1 2 oz), SpO2 98 %  Imaging and other studies: I have personally reviewed pertinent reports  Plan    Respiratory Plan: (P) Home Bronchodilator Patient pathway        Resp Comments: pt  instructed on the use of call bell  Bubbler hung

## 2019-10-03 NOTE — PLAN OF CARE
Problem: Potential for Falls  Goal: Patient will remain free of falls  Description  INTERVENTIONS:  - Assess patient frequently for physical needs  -  Identify cognitive and physical deficits and behaviors that affect risk of falls    -  [place] fall precautions as indicated by assessment   - Educate patient/family on patient safety including physical limitations  - Instruct patient to call for assistance with activity based on assessment  - Modify environment to reduce risk of injury  - Consider OT/PT consult to assist with strengthening/mobility  Outcome: Progressing     Problem: PAIN - ADULT  Goal: Verbalizes/displays adequate comfort level or baseline comfort level  Description  Interventions:  - Encourage patient to monitor pain and request assistance  - Assess pain using appropriate pain scale  - Administer analgesics based on type and severity of pain and evaluate response  - Implement non-pharmacological measures as appropriate and evaluate response  - Consider cultural and social influences on pain and pain management  - Notify physician/advanced practitioner if interventions unsuccessful or patient reports new pain  Outcome: Progressing     Problem: INFECTION - ADULT  Goal: Absence or prevention of progression during hospitalization  Description  INTERVENTIONS:  - Assess and monitor for signs and symptoms of infection  - Monitor lab/diagnostic results  - Monitor all insertion sites, i e  indwelling lines, tubes, and drains  - Monitor endotracheal if appropriate and nasal secretions for changes in amount and color  - [place] appropriate cooling/warming therapies per order  - Administer medications as ordered  - Instruct and encourage patient and family to use good hand hygiene technique  - Identify and instruct in appropriate isolation precautions for identified infection/condition  Outcome: Progressing  Goal: Absence of fever/infection during neutropenic period  Description  INTERVENTIONS:  - Monitor WBC    Outcome: Progressing     Problem: SAFETY ADULT  Goal: Patient will remain free of falls  Description  INTERVENTIONS:  - Assess patient frequently for physical needs  -  Identify cognitive and physical deficits and behaviors that affect risk of falls    -  [place] fall precautions as indicated by assessment   - Educate patient/family on patient safety including physical limitations  - Instruct patient to call for assistance with activity based on assessment  - Modify environment to reduce risk of injury  - Consider OT/PT consult to assist with strengthening/mobility  Outcome: Progressing  Goal: Maintain or return to baseline ADL function  Description  INTERVENTIONS:  -  Assess patient's ability to carry out ADLs; assess patient's baseline for ADL function and identify physical deficits which impact ability to perform ADLs (bathing, care of mouth/teeth, toileting, grooming, dressing, etc )  - Assess/evaluate cause of self-care deficits   - Assess range of motion  - Assess patient's mobility; develop plan if impaired  - Assess patient's need for assistive devices and provide as appropriate  - Encourage maximum independence but intervene and supervise when necessary  - Involve family in performance of ADLs  - Assess for home care needs following discharge   - Consider OT consult to assist with ADL evaluation and planning for discharge  - Provide patient education as appropriate  Outcome: Progressing  Goal: Maintain or return mobility status to optimal level  Description  INTERVENTIONS:  - Assess patient's baseline mobility status (ambulation, transfers, stairs, etc )    - Identify cognitive and physical deficits and behaviors that affect mobility  - Identify mobility aids required to assist with transfers and/or ambulation (gait belt, sit-to-stand, lift, walker, cane, etc )  - [place] fall precautions as indicated by assessment  - Record patient progress and toleration of activity level on Mobility SBAR; progress patient to next Phase/Stage  - Instruct patient to call for assistance with activity based on assessment  - Consider rehabilitation consult to assist with strengthening/weightbearing, etc   Outcome: Progressing     Problem: DISCHARGE PLANNING  Goal: Discharge to home or other facility with appropriate resources  Description  INTERVENTIONS:  - Identify barriers to discharge w/patient and caregiver  - Arrange for needed discharge resources and transportation as appropriate  - Identify discharge learning needs (meds, wound care, etc )  - Arrange for interpretive services to assist at discharge as needed  - Refer to Case Management Department for coordinating discharge planning if the patient needs post-hospital services based on physician/advanced practitioner order or complex needs related to functional status, cognitive ability, or social support system  Outcome: Progressing     Problem: Knowledge Deficit  Goal: Patient/family/caregiver demonstrates understanding of disease process, treatment plan, medications, and discharge instructions  Description  Complete learning assessment and assess knowledge base    Interventions:  - Provide teaching at level of understanding  - Provide teaching via preferred learning methods  Outcome: Progressing

## 2019-10-03 NOTE — UTILIZATION REVIEW
Initial Clinical Review    Admission: Date/Time/Statement: 10/2 @ 2307 OBSERVATION    Orders Placed This Encounter   Procedures    Place in Observation (expected length of stay for this patient is less than two midnights)     Standing Status:   Standing     Number of Occurrences:   1     Order Specific Question:   Admitting Physician     Answer:   Melani Gee [[id number]]     Order Specific Question:   Level of Care     Answer:   Med Surg [[id number]]     ED Arrival Information     Expected Arrival Acuity Means of Arrival Escorted By Service Admission Type    - 10/2/2019 21:01 Urgent Walk-In Friend General Medicine Urgent    Arrival Complaint    Back Pain dehydrating        Chief Complaint   Patient presents with    Weakness - Generalized    Back Pain    Diarrhea     Assessment/Plan: 81 y/o female presents to ED from home with b/l LE weakness  Reports getting progressively weaker and no longer able to ambulate  Reports 10/10 pain  Also c/o increasing LE edema and diarrhea for past 4 days  Admitted as observation  Colitis presumed infectious  Assessment & Plan  · Placed in observation Medicine  · Place on clear liquid diet  · Contact and hand washing isolation precautions  · Send stool for C diff, stool culture, white blood cells  · Consult GI  Effusion of bursa of left elbow  Assessment & Plan  Continue pre-hospital Keflex and consult Orthopedic surgery in a   Lumbar radiculopathy  Assessment & Plan  Place on Medrol Dosepak, give Tylenol, Norco or Toradol p r n  For pain and consult Orthopedics and PT OT in a      10/3 GI consult:  Assessment:  Diarrhea  Most likely infectious due to elevated white count  I doubt if she has any colitis  CT scan does not show any thickness in the colon  I did discuss and review the CT scan with Dr Vasiliy Kaur as well  Patient has no rectal bleeding or abdominal pain  Plan:  Agree with checking stool for C diff and culture sensitivity    She is on antibiotic and is at risk of C difficile      ED Triage Vitals   Temperature Pulse Respirations Blood Pressure SpO2   10/02/19 2109 10/02/19 2109 10/02/19 2109 10/02/19 2109 10/02/19 2109   (!) 97 4 °F (36 3 °C) 69 20 144/66 96 %      Temp Source Heart Rate Source Patient Position - Orthostatic VS BP Location FiO2 (%)   10/02/19 2109 10/02/19 2334 10/02/19 2334 10/02/19 2334 --   Temporal Monitor Lying Left arm       Pain Score       10/02/19 2109       Worst Possible Pain        Wt Readings from Last 1 Encounters:   10/03/19 78 5 kg (173 lb 1 oz)     Additional Vital Signs:   10/03/19 0728  96 8 °F (36 °C)Abnormal   66  19  102/62  98 %  Nasal cannula  Lying   10/03/19 0408  97 2 °F (36 2 °C)Abnormal   80  20  138/89  95 %           Pertinent Labs/Diagnostic Test Results:   Results from last 7 days   Lab Units 10/03/19  0513 10/02/19  2152 09/29/19  1050   WBC Thousand/uL 8 20 10 90* 14 00*   HEMOGLOBIN g/dL 13 1 13 8 13 8   HEMATOCRIT % 40 5* 42 6 43 0   PLATELETS Thousands/uL 247 264 184   NEUTROS ABS Thousands/µL 5 90 8 60*  --    TOTAL NEUT ABS Thousand/uL  --   --  13 30*   BANDS PCT %  --   --  9*         Results from last 7 days   Lab Units 10/03/19  0513 10/02/19  2152 09/29/19  1050   SODIUM mmol/L 138 141 136   POTASSIUM mmol/L 3 6 3 9 4 6   CHLORIDE mmol/L 105 102 96*   CO2 mmol/L 25 29 33*   ANION GAP mmol/L 8 10 7   BUN mg/dL 16 18 24   CREATININE mg/dL 0 57* 0 69 0 82   EGFR ml/min/1 73sq m 87 81 67   CALCIUM mg/dL 7 9* 8 3* 9 5     Results from last 7 days   Lab Units 10/02/19  2152   AST U/L 12*   ALT U/L 24   ALK PHOS U/L 50*   TOTAL PROTEIN g/dL 6 4   ALBUMIN g/dL 3 8   TOTAL BILIRUBIN mg/dL 0 40         Results from last 7 days   Lab Units 10/03/19  0513 10/02/19  2152 09/29/19  1050   GLUCOSE RANDOM mg/dL 122* 150* 239*             No results found for: BETA-HYDROXYBUTYRATE                   Results from last 7 days   Lab Units 10/02/19  2152   TROPONIN I ng/mL <0 03             Results from last 7 days   Lab Units 10/02/19  2152   TSH 3RD GENERATON uIU/mL 2 620                                 Results from last 7 days   Lab Units 10/02/19  2152   LIPASE u/L <10*     Results from last 7 days   Lab Units 09/29/19  1050   SED RATE mm/hour 8         Results from last 7 days   Lab Units 10/02/19  2236   CLARITY UA  Clear   COLOR UA  Yellow   SPEC GRAV UA  <=1 005*   PH UA  5 5   GLUCOSE UA mg/dl Negative   KETONES UA mg/dl Negative   BLOOD UA  Negative   PROTEIN UA mg/dl Negative   NITRITE UA  Negative   BILIRUBIN UA  Negative   UROBILINOGEN UA E U /dl 0 2   LEUKOCYTES UA  Negative     EKG:  Sinus rhythm with Premature supraventricular complexes  Low voltage QRS  Left anterior fascicular block  Cannot rule out Anterior infarct , age undetermined    CT abd/pelvis:  Liquid stool within the colon suggestive of colitis        Past Medical History:   Diagnosis Date    Asthma     Blurred vision     Cardiac disease     [facility]ch[facility] obstructive [facility]) [facility] [address] [facility]     Diverticulosis     Emphysema lung ([facility] )      Present on Admission:   Colitis presumed infectious   [facility]) [facility]   Effusion of bursa of left elbow   Lumbar radiculopathy      Admitting Diagnosis: Back pain [M54 9]  Colitis [K52 9]  Age/Sex: 82 y o  female  Admission Orders:    Current Facility-Administered Medications:  acetaminophen 650 mg Oral Q6H PRN Kip Koyanagi, PA-C    ALPRAZolam 0 5 mg Oral HS PRN Kip Koyanagi, PA-C    aspirin 81 mg Oral Daily Kip Koyanagi, PA-C    calcium carbonate-vitamin D 2 tablet Oral Daily With Breakfast Kip Kokunagi, PA-C    cephalexin 500 mg Oral [facility] Kip Koyanagi, PA-C    famotidine 20 mg Oral PRN Kip Koyanagi, PA-C    fluticasone-vilanterol 1 puff Inhalation Daily Kip Koyanagi, PA-C    furosemide 20 mg Oral Daily Kip Koyanagi, PA-C    heparin (porcine) 5,000 Units Subcutaneous [facility] Kip Koyanagi, PA-C    HYDROcodone-acetaminophen 1 tablet Oral Q6H PRN Kip Koyanagi, PA-C ipratropium-albuterol 3 mL Nebulization Q6H While awake Ulysses Milroy, MD    ketorolac 15 mg Intravenous Q6H PRN Yolande Ortiz PA-C    [START ON 10/4/2019] methylprednisolone 20 mg Oral Daily Yolande Ortiz PA-C    Followed by        Jono [place] ON 10/5/2019] methylPREDNISolone 16 mg Oral Daily Yolande Ortiz PA-C    Followed by        Jono [place] ON 10/6/2019] methylPREDNISolone 12 mg Oral Daily Yolande Ortiz PA-C    Followed by        Jono [place] ON 10/7/2019] methylPREDNISolone 8 mg Oral Daily Yolande Ortiz PA-C    Followed by        Augustldcharlene Fiona ON 10/8/2019] methylPREDNISolone 4 mg Oral Daily Yolande Ortiz PA-C    montelukast 10 mg Oral HS Yolande Ortiz PA-C    nitroglycerin 0 4 mg Sublingual Q5 Min PRN Yolande Ortiz PA-C    ondansetron 4 mg Intravenous Q6H PRN Yolande Ortiz PA-C    potassium chloride 20 mEq Oral Daily Yolande Ortiz PA-C    sodium chloride 100 mL/hr Intravenous Continuous Yolande Ortiz PA-C Last Rate: 100 mL/hr (10/03/19 0124)       IP CONSULT TO GASTROENTEROLOGY  IP CONSULT TO [address] [place] [address] Review Department  Phone: [phone]; Fax [phone]  [email or web address]  ATTENTION: Please call with any questions or concerns to [phone]  and carefully listen to the prompts so that you are directed to the right person  Send all requests for admission clinical reviews, approved or denied determinations and any other requests to fax [phone]   All voicemails are confidential

## 2019-10-03 NOTE — SOCIAL WORK
Evaluated the pt at the bedside  Pt lives in a one story home with 3 SAVANNAH  Explained the CM role and the options of discharge planning  Pt states she is independent with ADl's and IADL's except for driving  Pt states she works as a  in the am and afternoon  Pt indicated friends or the [employer]  will transport her for groceries  Pt states she has oxygen at home and only wears it in the evening and HS when she gets home from the bus  Pt states she wears 5L/min  Her oxygen supplier is [employer]  Pt gets her medications from [facility] in [place]  Pt states a friend will transport her home upon discharge  Patient/caregiver received discharge checklist   Content reviewed  Patient/caregiver encouraged to participate in discharge plan of care prior to discharge home  CM reviewed d/c planning process including the following: identifying help at home, patient preference for d/c planning needs, availability of treatment team to discuss questions or concerns patient and/or family may have regarding understanding medications and recognizing signs and symptoms once discharged  CM also encouraged patient to follow up with all recommended appointments after discharge  Patient advised of importance for patient and family to participate in managing patients medical well being

## 2019-10-03 NOTE — OCCUPATIONAL THERAPY NOTE
Occupational Therapy Evaluation      Buchanantim Wademireya    10/3/2019    Patient Active Problem List   Diagnosis    Rectal bleed    Asthma    [facility]) ([facility] [address] [facility]    Cardiac disease    Diverticulosis of intestine with bleeding    Functional diarrhea    Colorectal polyps    Dyspnea    Effusion of bursa of left elbow    Cardiomegaly    Chronic anxiety    Chronic cystitis    Chronic low back pain    Congestive heart failure (HCC)    Deviated nasal septum    Diverticulosis of colon    Gastroesophageal reflux disease without esophagitis    Gout    Herpes zoster    History of angioedema    History of colonic polyps    Lower gastrointestinal hemorrhage    Lumbar radiculopathy    Macular degeneration of both eyes    Obesity    Osteoporosis    Other specified forms of chronic ischemic heart disease    Seasonal allergies    Panic attack    Vocal cord dysfunction    Colitis presumed infectious       Past Medical History:   Diagnosis Date    Asthma     Blurred vision     Cardiac disease     COPD (chronic obstructive pulmonary disease) (HCC)     Diverticulosis     Emphysema lung ([facility] [address] )        Past Surgical History:   Procedure Laterality Date    COLON SURGERY      COLONOSCOPY W/ POLYPECTOMY N/A 1/21/2019    Procedure: COLONOSCOPY W/ POLYPECTOMY;  Surgeon: Alfredo Chavez MD;  Location: [address] [place] [address] GI LAB; Service: General        10/03/19 0952   Note Type   Note type Eval only   Restrictions/Precautions   Weight Bearing Precautions Per Order No   Other Precautions O2;Fall Risk;Cognitive  (5 L O2 via NC- uses at home at baseline )   Pain Assessment   Pain Assessment No/denies pain   Pain Score No Pain   Home Living   Type of [address] [place] [address] One level;Performs ADLs on one level; Able to live on main level with bedroom/bathroom;Stairs to enter with rails  (3 SAVANNAH)   Bathroom Shower/Tub Tub/shower unit   Bathroom Toilet Standard   Bathroom Equipment Grab bars in shower;Grab bars around toilet   [address]   (no AD used at baseline )   Prior Function   Level of [place] Independent with ADLs and functional mobility   Lives With Alone   Receives Help From Friend(s); Neighbor   ADL Assistance Independent   IADLs Needs assistance  (transportation)   Falls in the last 6 months 0   Vocational Full time employment   Lifestyle   Autonomy Patient reporting being independent with ADLs, ambulatory with no AD and lives alone in a one story house with 3 SAVANNAH   Reciprocal Relationships friends    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    ADL   [address] 6  Modified independent   [address] [facility] 5  [address] [facility] 5  [address] [facility] 4  [address] 5  [address] 4  [address]  5  Supervision/Setup   Bed Mobility   Supine to [address]   Additional items Assist x 1;HOB elevated; Bedrails;Verbal cues   Additional Comments Pt OOB and seated in chair at end of eval    Transfers   Sit to Stand 5  Supervision   Additional items Assist x 1;Bedrails;Verbal cues   Stand to Sit 5  Supervision   Additional items Assist x 1; Armrests; Verbal cues   Stand pivot 5  Supervision   Additional items Assist x 1   Additional Comments Pt reported feeling weak during stand pivot transfer    Functional Mobility   Functional Mobility 5  Supervision   Balance   Static Sitting Good   Dynamic Sitting Good   Static Standing Good   Dynamic Standing Fair +   Activity Tolerance   Activity Tolerance Patient limited by fatigue   Nurse Made Aware RN made aware of outcomes    RUE Assessment   RUE Assessment WFL  (4-/5)   LUE Assessment   LUE Assessment WFL  (4-/5)   Hand Function   Gross Motor Coordination Functional   Fine Motor Coordination Functional   Cognition   Overall Cognitive Status WFL   Arousal/Participation Alert; Responsive; Cooperative   Attention Within functional limits   Orientation Level Oriented X4   Memory Within functional limits   Following Commands Follows one step commands without difficulty   Assessment   Limitation Decreased ADL status; Decreased UE strength;Decreased endurance;Decreased self-care trans;Decreased high-level ADLs   Prognosis Fair   Assessment Pt is a 82 y o  female who was admitted to [address] [place] [address] on 10/2/2019 with Colitis presumed infectious  At this time, patient is also affected by the comorbidities of: COPD, effusion of bursa of L elbow  Additionally, patient is affected by the following personal factors:steps to enter environment, limited home support, difficulty performing ADLS and difficulty performing IADLS   Orders placed for OT evaluation/treatment with up and OOB as tolerated orders  Prior to admission, Patient reporting being independent with ADLs, ambulatory with no AD and lives alone in a one story house with 3 SAVANNAH  Upon OT evaluation, patient requires supervision/set-up for UB ADLs and minimum assist for LB ADLs  Occupational performance is affected by the following deficits: decreased strength, decreased balance and decreased tolerance  Based on the following information, patient would benefit from continued skilled OT treatment while in the hospital to address deficits and maximize level of functional independence with ADL's and functional mobility  Occupational Performance areas to address include: grooming, bathing/shower, toilet hygiene, dressing, medication management, functional mobility, clothing management, meal prep and household maintenance  From OT standpoint, recommendation at time of d/c would be home OT  Goals   Patient Goals to go home    Plan   Treatment Interventions ADL retraining;Functional transfer training;UE strengthening/ROM; Endurance training;Neuromuscular reeducation; Compensatory technique education   Goal Expiration Date 10/13/19   OT Treatment Day 0   OT Frequency 3-5x/wk   Recommendation   OT Discharge Recommendation Home OT   OT - OK to Discharge Yes  (Once medically cleared)   Barthel Index   Feeding 10   Bathing 0   Grooming Score 5   Dressing Score 5   Bladder Score 10   Bowels Score 10   Toilet Use Score 5   Transfers (Bed/Chair) Score 10   Mobility (Level Surface) Score 0   Stairs Score 0   Barthel Index Score 55     GOALS:    *ADL transfers with (I) for inc'd independence with ADLs/purposeful tasks    *UB ADL with (I) for inc'd independence with self cares    *LB ADL with (I) using AE prn for inc'd independence with self cares    *Toileting with (I) for clothing management and hygiene for return to PLOF with personal care    *Increase static stand balance to good and dyn stand balance to good for inc'd safety with standing purposeful tasks    *Increase stand tolerance x10 m for inc'd tolerance with standing purposeful tasks    *Participate in 10m UE therex to increase overall stamina/activity tolerance for purposeful tasks    *Bed mobility- (I) for inc'd independence to manage own comfort and initiate EOB & OOB purposeful tasks    *Patient will verbalize 3 safety awareness/ principles to prevent falls in the home setting  *Patient will verbalize and demonstrate use of energy conservation/deep breathing techniques and work simplification skills during functional activities with no verbal cues  *Patient will increase OOB/sitting tolerance to 2-4 hours per day to increase participation in self-care and leisure tasks with no s/s of exertion           Teretha Severe, MS, OTR/L

## 2019-10-03 NOTE — ASSESSMENT & PLAN NOTE
Place on Medrol Dosepak, give Tylenol, Norco or Toradol p r n  For pain and consult Orthopedics and PT OT in a m

## 2019-10-03 NOTE — PHYSICIAN ADVISOR
Current patient class: Observation  The patient is currently on Hospital Day: 2 at [address]      This patient was originally admitted to the hospital under observation status  After admission the patient was reevaluated and determined to require further hospitalization  The patient is now documented to require at least a 2nd midnight in the hospital  As such the patient is now expected to satisfy the 2 midnight benchmark and is therefore eligible for inpatient admission  After review of the relevant documentation, labs, vital signs and test results, the patient is appropriate for INPATIENT ADMISSION  Admission to the hospital as an inpatient is a complex decision making process which requires the practitioner to consider the patients presenting complaint, history and physical examination and all relevant testing  With this in mind, in this case, the patient was deemed appropriate for INPATIENT ADMISSION  After review of the documentation and testing available at the time of the admission I concur with this clinical determination of medical necessity  Rationale is as follows: The patient is an 82-year-old female who presented to the hospital on October 2, 2019 with diarrhea  This is presumed to be infectious in nature, had leukocytosis  She is at risk for C diff colitis  The patient remains on intravenous fluids  Today so far she has received intravenous morphine and intravenous Toradol  The patient will remain in the hospital for a second midnight  She continues to receive care for diarrhea and at this point is appropriate for change to inpatient class      The patients vitals on arrival were ED Triage Vitals   Temperature Pulse Respirations Blood Pressure SpO2   10/02/19 2109 10/02/19 2109 10/02/19 2109 10/02/19 2109 10/02/19 2109   (!) 97 4 °F (36 3 °C) 69 20 144/66 96 %      Temp Source Heart Rate Source Patient Position - Orthostatic VS BP Location FiO2 (%) 10/02/19 2109 10/02/19 2334 10/02/19 2334 10/02/19 2334 --   Temporal Monitor Lying Left arm       Pain Score       10/02/19 2109       Worst Possible Pain           Past Medical History:   Diagnosis Date    Asthma     Blurred vision     Cardiac disease     COPD (chronic obstructive pulmonary disease) (HCC)     Diverticulosis     Emphysema lung (HCC)      Past Surgical History:   Procedure Laterality Date    COLON SURGERY      COLONOSCOPY W/ POLYPECTOMY N/A 1/21/2019    Procedure: COLONOSCOPY W/ POLYPECTOMY;  Surgeon: Vin Ron MD;  Location: [facility] GI LAB;   Service: General       The patient was admitted to the hospital at N/A on N/A for the following diagnosis:  Back pain [M54 9]  Colitis [K52 9]    Consults have been placed to:   IP CONSULT TO GASTROENTEROLOGY  IP CONSULT TO ORTHOPEDIC SURGERY    Vitals:    10/03/19 0728 10/03/19 0900 10/03/19 1405 10/03/19 1410   BP: 102/62   137/81   BP Location: Right arm   Right arm   Pulse: 66   (!) 50   Resp: 19   20   Temp: (!) 96 8 °F (36 °C)   97 8 °F (36 6 °C)   TempSrc: Tympanic   Tympanic   SpO2: 98% 97% 97% 97%   Weight:       Height:           Most recent labs:    Recent Labs     10/02/19  2152 10/03/19  0513   WBC 10 90* 8 20   HGB 13 8 13 1   HCT 42 6 40 5*    247   K 3 9 3 6   CALCIUM 8 3* 7 9*   BUN 18 16   CREATININE 0 69 0 57*   LIPASE <10*  --    TROPONINI <0 03  --    AST 12*  --    ALT 24  --    ALKPHOS 50*  --        Scheduled Meds:  Current Facility-Administered Medications:  acetaminophen 650 mg Oral Q6H PRN Anthony Verde PA-C    ALPRAZolam 0 5 mg Oral HS PRN Anthony Verde PA-C    aspirin 81 mg Oral Daily Anthony Verde PA-C    calcium carbonate-vitamin D 2 tablet Oral Daily With Breakfast Anthony Verde PA-C    famotidine 20 mg Oral PRN Anthony Verde PA-C    fluticasone-vilanterol 1 puff Inhalation Daily Anthony Verde PA-C    furosemide 20 mg Oral Daily Anthony Verde PA-C    heparin (porcine) 5,000 Units Subcutaneous [facility] Marily Alexander Joe Vasques PA-C    HYDROcodone-acetaminophen 1 tablet Oral Q6H PRN Geo Mates, PA-C    ipratropium-albuterol 3 mL Nebulization Q6H While awake Sourav Quijano MD    ketorolac 15 mg Intravenous Q6H PRN Geo Mates, PA-C    [START ON 10/4/2019] methylprednisolone 20 mg Oral Daily Geo Mates, PA-C    Followed by        Havana Porteous ON 10/5/2019] methylPREDNISolone 16 mg Oral Daily Geo Mates, PA-C    Followed by        Marcy Porteous ON 10/6/2019] methylPREDNISolone 12 mg Oral Daily Geo Mates, PA-C    Followed by        Marcy Porteous ON 10/7/2019] methylPREDNISolone 8 mg Oral Daily Geo Mates, PA-C    Followed by        Marcy Porteous ON 10/8/2019] methylPREDNISolone 4 mg Oral Daily Geo Mates, PA-C    montelukast 10 mg Oral HS Geo Mates, PA-C    nitroglycerin 0 4 mg Sublingual Q5 Min PRN Geo Mates, PA-C    ondansetron 4 mg Intravenous Q6H PRN Geo Mates, PA-C    potassium chloride 20 mEq Oral Daily Geo Mates, PA-C    sodium chloride 100 mL/hr Intravenous Continuous Geo Mates, PA-C Last Rate: 100 mL/hr (10/03/19 1231)     Continuous Infusions:  sodium chloride 100 mL/hr Last Rate: 100 mL/hr (10/03/19 1231)     PRN Meds:   acetaminophen    ALPRAZolam    famotidine    HYDROcodone-acetaminophen    ketorolac    nitroglycerin    ondansetron    Surgical procedures (if appropriate):

## 2019-10-03 NOTE — ASSESSMENT & PLAN NOTE
Give Duo nebs q 6 hours while awake and substitute Breo 100/25 1 inhalation daily for pre-hospital trelegy

## 2019-10-03 NOTE — PHYSICAL THERAPY NOTE
Physical Therapy Evaluation     Patient's Name: Gabriela Cristobal    Admitting Diagnosis  Back pain [M54 9]  Colitis [K52 9]    Problem List  Patient Active Problem List   Diagnosis    Rectal bleed    Asthma    COPD [facility]chronic obstructive [facility] disease) ([facility]    Cardiac disease    Diverticulosis of intestine with bleeding    Functional diarrhea    Colorectal polyps    Dyspnea    Effusion of bursa of left elbow    Cardiomegaly    Chronic anxiety    Chronic cystitis    Chronic low back pain    Congestive heart failure ([facility] )    Deviated nasal septum    Diverticulosis of colon    Gastroesophageal reflux disease without esophagitis    Gout    Herpes zoster    History of angioedema    History of colonic polyps    Lower gastrointestinal hemorrhage    Lumbar radiculopathy    Macular degeneration of both eyes    Obesity    Osteoporosis    Other specified forms of chronic ischemic heart disease    Seasonal allergies    Panic attack    Vocal cord dysfunction    Colitis presumed infectious       Past Medical History  Past Medical History:   Diagnosis Date    Asthma     Blurred vision     Cardiac disease     COPD [facility]chronic obstructive pulmonary disease) ([facility]     Diverticulosis     Emphysema lung ([facility] )        Past Surgical History  Past Surgical History:   Procedure Laterality Date    COLON SURGERY      COLONOSCOPY W/ POLYPECTOMY N/A 1/21/2019    Procedure: COLONOSCOPY W/ POLYPECTOMY;  Surgeon: Elvis Ndiaye MD;  Location: [facility] GI LAB; Service: General        10/03/19 1878   Note Type   Note type Eval only   Pain Assessment   Pain Assessment No/denies pain   Pain Score No Pain   Home Living   Type of [address] [place] [address] One level;Performs ADLs on one level; Able to live on main level with bedroom/bathroom;Stairs to enter with rails  (3 SAVANNAH)   Bathroom Shower/Tub Tub/shower unit  (PTA, could not step into tub x 4 days)   Bathroom Toilet Standard   Bathroom Equipment Grab bars in shower;Grab bars around toilet  (pt  would like a shower chair)   [address]   (PTA, pt  did not utilize an AD )   Prior Function   Level of [place] Independent with ADLs and functional mobility   Lives With Alone   Receives Help From Friend(s); Neighbor   ADL Assistance Independent   IADLs Needs assistance  (transportation)   Falls in the last 6 months 0   Vocational Full time employment   Restrictions/Precautions   Weight Bearing Precautions Per Order No   Other Precautions O2;Fall Risk;Contact/isolation  (utilizes home O2 at night, 5 L O2)   General   Family/Caregiver Present No   Cognition   Overall Cognitive Status WFL   Arousal/Participation Alert   Orientation Level Oriented X4   Memory Within functional limits   Following Commands Follows one step commands without difficulty   RUE Assessment   RUE Assessment   (please see OT assessment)   LUE Assessment   LUE Assessment   (please see OT assessment)   RLE Assessment   RLE Assessment X   Strength RLE   R Hip Flexion 3+/5   R Knee Extension 3+/5   R Ankle Dorsiflexion 3+/5   LLE Assessment   LLE Assessment X   Strength LLE   L Hip Flexion 3+/5   L Knee Extension 3+/5   L Ankle Dorsiflexion 3+/5   Coordination   Movements are Fluid and Coordinated 1   Light Touch   RLE Light Touch Grossly intact   LLE Light Touch Grossly intact   Sharp/Dull   RLE Sharp/Dull Grossly intact   LLE Sharp/Dull Grossly intact   Bed Mobility   Rolling R 6  Modified independent   Additional items HOB elevated; Bedrails   Supine to Sit 5  Supervision   Additional items Assist x 1;HOB elevated; Bedrails;Verbal cues   Sit to Supine   (DNT as pt  remained OOB in chair upon conclusion )   Transfers   Sit to Stand 5  Supervision   Additional items Assist x 1;Bedrails;Verbal cues   Stand to Sit 5  Supervision   Additional items Assist x 1; Armrests; Verbal cues   Stand pivot 5  Supervision   Additional items Assist x 1   Ambulation/Elevation   Gait pattern Forward Flexion;Decreased foot clearance; Short stride   Gait Assistance 5  Supervision   Additional items Assist x 1;Verbal cues; Tactile cues   Assistive Device None   Distance 10 feet   (from bedside->chair)   Balance   Static Sitting Good   Dynamic Sitting Good   Static Standing Good   Dynamic Standing Fair +   Ambulatory Fair   Endurance Deficit   Endurance Deficit Yes   Endurance Deficit Description SOB present upon functional mobility advancement w/WB tasks, resolved w/ rest    Activity Tolerance   Activity Tolerance Patient limited by fatigue   Nurse Made Aware yes   Assessment   Prognosis Good   Problem List Decreased strength;Decreased endurance; Impaired balance;Decreased mobility; Decreased safety awareness   Assessment Pt is 82 y o  female seen for PT evaluation [facility] w/ Colitis presumed infectious  PT consulted to assess pt's functional mobility and d/c needs  Order placed for PT eval and tx, w/ activity as tolerated order  Comorbidities affecting pt's physical performance at time of assessment include: SOB, weakness, colitis, COPD w/ O2 usage, asthma, anxiety, CHF  PTA, pt was independent w/ all functional mobility w/ o AD  Personal factors affecting pt at time of IE include: inaccessible home environment, inability to ambulate household distances, inability to navigate community distances, unable to perform dynamic tasks in community and limited insight into impairments  Please find objective findings from PT assessment regarding body systems outlined above with impairments and limitations including weakness, impaired balance, decreased endurance, gait deviations, decreased activity tolerance, decreased functional mobility tolerance, decreased safety awareness, impaired judgement, fall risk and SOB upon exertion  The following objective measures performed on IE also reveal limitations: Barthel Index: 60/100   Pt's clinical presentation is currently unstable/unpredictable  Pt to benefit from continued PT tx to address deficits as defined above and maximize level of functional independent mobility and consistency  From PT/mobility standpoint, recommendation at time of d/c would be Home PT pending progress in order to facilitate return to PLOF  Barriers to Discharge Decreased caregiver support   Barriers to Discharge Comments Pt  lives alone   Goals   Patient Goals go home soon   LTG Expiration Date 10/13/19   Long Term Goal #1 1 )Patient will complete bed mobility independently for decrease need for caregiver assistance, decrease burden of care  2 ) Patient will complete transfers modified I  to decrease risk of falls, facilitate upright standing posture  3 ) BLE strength to greater than/equal to 4-/5 gross musculature to increase ability to safely transfer, control descent to chair  4 ) Patient will exhibit increase dynamic standing balance to Good , for 2-3 minutes without LOB modified I  to improve endurance  5 ) Patient will exhibit increase dynamic ambulatory balance to Fair+ for 100-150 feet independently  to improve ability to mobilize to toilet, chair and decrease risk for additional medical complications  6 ) Patient will exhibit good self monitoring and ability to follow 2 step commands to increase complexity of tasks and resume ADL's without LOB  PT Treatment Day 0   Plan   Treatment/Interventions Functional transfer training;LE strengthening/ROM; Therapeutic exercise; Endurance training;Bed mobility;Gait training; Compensatory technique education;OT   PT Frequency Other (Comment)  (3-5x/wk)   Recommendation   Recommendation Home PT   PT - OK to Discharge Yes  (if medically stable)   Additional Comments Upon conclusion, pt  was sitting OOB in chair w/all needs within reach     Barthel Index   Feeding 10   Bathing 5   Grooming Score 5   Dressing Score 10   Bladder Score 5   Bowels Score 10   Toilet Use Score 5   Transfers (Bed/Chair) Score 10 Mobility (Level Surface) Score 0   Stairs Score 0   Barthel Index Score 60     Sarkis Farmer, PT

## 2019-10-03 NOTE — CONSULTS
Consultation - Mojgan Yao 82 y o  female MRN: [id number]  Unit/Bed#: -02 Encounter: [id number]      Assessment/Plan      Assessment:  Diarrhea  Most likely infectious due to elevated white count  I doubt if she has any colitis  CT scan does not show any thickness in the colon  I did discuss and review the CT scan with Dr Jonathan Palomino as well  Patient has no rectal bleeding or abdominal pain  Plan:  Agree with checking stool for C diff and culture sensitivity  She is on antibiotic and is at risk of C difficile  History of Present Illness   Physician Requesting Consult: Selvin Howard MD  Reason for Consult / Principal Problem:  Diarrhea     Hx and PE limited by:   HPI: Sravani Nava is a 82y o  year old female who presents with 1-2 days of severe diarrhea  She says that she had multiple greater than 10 loose bowel movements which are watery and felt that she was getting dehydrated  She also has lower back pains and says she has lumbar radiculopathy  Patient had been on antibiotics recently  Denies any melena or bright red blood per rectum denies any fevers or chills  Blood work shows slight elevation of the white count but normal hemoglobin hematocrit  CT scan did not show any thickening of the colon consistent with colitis  I discussed this with Dr Jonathan Palomino who did not think that the CT scan shows any colon thickness suggestive of colitis  Consults    Review of Systems    Historical Information   Past Medical History:   Diagnosis Date    Asthma     Blurred vision     Cardiac disease     COPD (chronic obstructive pulmonary disease) ([facility] )     Diverticulosis     Emphysema lung ([facility] )      Past Surgical History:   Procedure Laterality Date    COLON SURGERY      COLONOSCOPY W/ POLYPECTOMY N/A 1/21/2019    Procedure: COLONOSCOPY W/ POLYPECTOMY;  Surgeon: Nan Reddy MD;  Location: [facility] GI LAB;   Service: General     Social History   Social History     Substance and Sexual Activity   Alcohol Use Not Currently    Frequency: Never    Binge frequency: Never     Social History     Substance and Sexual Activity   Drug Use No     Social History     Tobacco Use   Smoking Status Former Smoker    Last attempt to quit: 2013    Years since quittin 8   Smokeless Tobacco Never Used     Family History: non-contributory    Meds/Allergies   all current active meds have been reviewed    Allergies   Allergen Reactions    Fluticasone        Objective     No intake or output data in the 24 hours ending 10/03/19 1448    Invasive Devices:        Physical Exam   Elderly white female in no acute distress  She is afebrile at this time  Pulse is 50 per minute  Respiratory rate 20 per minute  BP is 137/81  HEENT anicteric sclera neck supple  No JVD  Chest lungs clear to auscultation percussion  Heart  rhythm regular S1-S2  Sinus bradycardia  Abdomen soft nontender nondistended positive bowel sounds  Extremities no edema cyanosis or  Neuro awake alert oriented x3 cranial nerves 2-12 intact grossly  Lab work shows a white count of 10 90 which is slightly elevated  Hemoglobin 13 8 hematocrit 42 6%  Platelet count [id number]  CT scan shows liquid stool in colon some fat containing anterior abdominal wall hernia renal cysts and fatty liver    Lab Results: I have personally reviewed pertinent reports  Imaging Studies: I have personally reviewed pertinent reports  EKG, Pathology, and Other Studies:     VTE Prophylaxis:     Counseling / Coordination of Care  Total floor / unit time spent today 70 minutes  Greater than 50% of total time was spent with the patient and / or family counseling and / or coordination of care  A description of the counseling / coordination of care:  I discussed this with the hospitalist Dr Donna Gates as well  If the stool studies are positive for any infectious etiology then that should be treated with antibiotics

## 2019-10-03 NOTE — NURSING NOTE
Pt c/o chest pain, jaw pain  EKG obtained, Guillermo Cobos PA-C notified  New orders received  Pt is anxious  Pt belching and stated that she felt better after she belched  Will continue to monitor

## 2019-10-03 NOTE — PLAN OF CARE
Problem: OCCUPATIONAL THERAPY ADULT  Goal: Performs self-care activities at highest level of function for planned discharge setting  See evaluation for individualized goals  Description  Treatment Interventions: ADL retraining, Functional transfer training, UE strengthening/ROM, Endurance training, Neuromuscular reeducation, Compensatory technique education          See flowsheet documentation for full assessment, interventions and recommendations  Note:   Limitation: Decreased ADL status, Decreased UE strength, Decreased endurance, Decreased self-care trans, Decreased high-level ADLs  Prognosis: Fair  Assessment: Pt is a 82 y o  female who was admitted to [address] [place] [address] on 10/2/2019 with Colitis presumed infectious  At this time, patient is also affected by the comorbidities of: COPD, effusion of bursa of L elbow  Additionally, patient is affected by the following personal factors:steps to enter environment, limited home support, difficulty performing ADLS and difficulty performing IADLS   Orders placed for OT evaluation/treatment with up and OOB as tolerated orders  Prior to admission, Patient reporting being independent with ADLs, ambulatory with no AD and lives alone in a one story house with 3 SAVANNAH  Upon OT evaluation, patient requires supervision/set-up for UB ADLs and minimum assist for LB ADLs  Occupational performance is affected by the following deficits: decreased strength, decreased balance and decreased tolerance  Based on the following information, patient would benefit from continued skilled OT treatment while in the hospital to address deficits and maximize level of functional independence with ADL's and functional mobility  Occupational Performance areas to address include: grooming, bathing/shower, toilet hygiene, dressing, medication management, functional mobility, clothing management, meal prep and household maintenance   From OT standpoint, recommendation at time of d/c would be home OT       OT Discharge Recommendation: Home OT  OT - OK to Discharge: Yes(Once medically cleared)     Henrique Hastings, OT

## 2019-10-03 NOTE — PLAN OF CARE
Problem: PHYSICAL THERAPY ADULT  Goal: Performs mobility at highest level of function for planned discharge setting  See evaluation for individualized goals  Description  Treatment/Interventions: Functional transfer training, LE strengthening/ROM, Therapeutic exercise, Endurance training, Bed mobility, Gait training, Compensatory technique education, OT     See flowsheet documentation for full assessment, interventions and recommendations  Ermelinda Alba, PT     Note:   Prognosis: Good  Problem List: Decreased strength, Decreased endurance, Impaired balance, Decreased mobility, Decreased safety awareness  Assessment: Pt is 82 y o  female seen for PT evaluation s/p admit to [address] [place] [address] on 10/2/2019 w/ Colitis presumed infectious  PT consulted to assess pt's functional mobility and d/c needs  Order placed for PT eval and tx, w/ activity as tolerated order  Comorbidities affecting pt's physical performance at time of assessment include: SOB, weakness, colitis, COPD w/ O2 usage, asthma, anxiety, CHF  PTA, pt was independent w/ all functional mobility w/ o AD  Personal factors affecting pt at time of IE include: inaccessible home environment, inability to ambulate household distances, inability to navigate community distances, unable to perform dynamic tasks in community and limited insight into impairments  Please find objective findings from PT assessment regarding body systems outlined above with impairments and limitations including weakness, impaired balance, decreased endurance, gait deviations, decreased activity tolerance, decreased functional mobility tolerance, decreased safety awareness, impaired judgement, fall risk and SOB upon exertion  The following objective measures performed on IE also reveal limitations: Barthel Index: 60/100  Pt's clinical presentation is currently unstable/unpredictable   Pt to benefit from continued PT tx to address deficits as defined above and maximize level of functional independent mobility and consistency  From PT/mobility standpoint, recommendation at time of d/c would be Home PT pending progress in order to facilitate return to PLOF  Barriers to Discharge: Decreased caregiver support  Barriers to Discharge Comments: Pt  lives alone  Recommendation: Home PT     PT - OK to Discharge: Yes(if medically stable)    See flowsheet documentation for full assessment     Aravind Moreau PT

## 2019-10-03 NOTE — H&P
H&P- Luis Cm 1935, 82 y o  female MRN: [id number]    Unit/Bed#: -02 Encounter: [id number]    Primary Care Provider: Glena Goldmann, MD   Date and time admitted to hospital: 10/2/2019  9:08 PM        * Colitis presumed infectious  Assessment & Plan  · Placed in observation Medicine  · Place on clear liquid diet  · Contact and hand washing isolation precautions  · Send stool for C diff, stool culture, white blood cells  · Consult GI    Effusion of bursa of left elbow  Assessment & Plan  Continue pre-hospital Keflex and consult Orthopedic surgery in a m  Lumbar radiculopathy  Assessment & Plan  Place on Medrol Dosepak, give Tylenol, Norco or Toradol p r n  For pain and consult Orthopedics and PT OT in a m  COPD (chronic obstructive pulmonary disease) ([facility])  Assessment & Plan  Give Duo nebs q 6 hours while awake and substitute Breo 100/25 1 inhalation daily for pre-hospital trelegy      VTE Prophylaxis: Heparin  Code Status:  Level 1  POLST: There is no POLST form on file for this patient (pre-hospital)  Discussion with family:  None present at bedside at time of exam    Anticipated Length of Stay:  Patient will be admitted on an Observation basis with an anticipated length of stay of  < 2 midnights  Justification for Hospital Stay:  Lumbar radiculopathy requiring PT OT as well as orthopedic evaluation, colitis requiring GI evaluation and bursitis of left elbow requiring orthopedic evaluation    Chief Complaint:   Back pain times 10 days    History of Present Illness:    Luis Cm is a 82 y o  female who presents with back pain times 10 days  Patient was previously seen in our ER 09/29/2019 diagnosed with lumbar radiculopathy treated with a course of steroids the patient was referred to Orthopedics for follow-up though she apparently never made this phone call    Patient returns ER tonight with multiple complaints to include lumbar radiculopathy making it impossible for her to walk as well as multiple episodes of diarrhea and swelling of her left elbow  Patient states that she has 10/10 lumbar back pain with radiation into her bilateral thighs she states that she felt that she twisted her back attempting to get out of bed approximately 10 days ago  Patient had previously been on any Medrol Dosepak as well as received some IV Toradol and morphine in the ER her symptoms seemed to improve until they returned approximately 3 days ago  Additionally patient has left elbow bursitis which is being treated with Keflex she was referred to Orthopedics for further evaluation treatment that she never made to call for an appointment  Patient reports some diffuse crampy abdominal pain accompanied with multiple episodes of diarrhea over the past 3 days approximately 20/30 episodes per day and she does have a history of functional diarrhea though this is somewhat concerning given its proximity to being placed on antibiotics but she does state that her diarrhea actually is began she believes the day prior to starting antibiotics  Patient denies any fever chills, no bright red blood per rectum or dark stools  Review of Systems:  Review of Systems   Constitutional: Negative for chills and fever  Respiratory: Negative for cough, shortness of breath and wheezing  Cardiovascular: Negative for chest pain and palpitations  Gastrointestinal: Positive for abdominal pain and diarrhea  Negative for blood in stool, nausea and vomiting  Genitourinary: Negative for dysuria, frequency, hematuria and urgency  Musculoskeletal: Positive for arthralgias, back pain, gait problem and joint swelling  Skin: Positive for color change  Neurological: Negative for dizziness, syncope, weakness, light-headedness and headaches  All other systems reviewed and are negative        Past Medical and Surgical History:   Past Medical History:   Diagnosis Date    Asthma     Blurred vision     Cardiac disease     COPD (chronic obstructive pulmonary disease) (HCC)     Diverticulosis     Emphysema lung ([facility] [address] )        Past Surgical History:   Procedure Laterality Date    COLON SURGERY      COLONOSCOPY W/ POLYPECTOMY N/A 1/21/2019    Procedure: COLONOSCOPY W/ POLYPECTOMY;  Surgeon: Veronica Cedeño MD;  Location: [address] [place] [address] GI LAB; Service: General       Meds/Allergies:  Prior to Admission medications    Medication Sig Start Date End Date Taking?  Authorizing Provider   fluticasone-umeclidinium-vilanterol (TRELEGY ELLIPTA) 100-62 5-25 MCG/INH inhaler 1 puff daily 9/4/19  Yes Historical Provider, MD   furosemide (LASIX) 20 mg tablet Take 20 mg by mouth daily   Yes Historical Provider, MD   montelukast (SINGULAIR) 10 mg tablet Take 10 mg by mouth daily at bedtime   Yes Historical Provider, MD   potassium chloride (MICRO-K) 10 MEQ CR capsule Take 10 mEq by mouth daily   Yes Historical Provider, MD   Albuterol Sulfate 108 (90 Base) MCG/ACT AEPB Inhale 2 puffs 3 (three) times a day as needed    Historical Provider, MD   alendronate (FOSAMAX) 70 mg tablet Take 70 mg by mouth every 7 days    Historical Provider, MD   ALPRAZolam Sofia Curly) 0 5 mg tablet Take 0 5 mg by mouth daily at bedtime as needed    Historical Provider, MD   aspirin (ECOTRIN LOW STRENGTH) 81 mg EC tablet Take 81 mg by mouth Daily 7/10/18 10/3/19  Historical Provider, MD   Calcium Carb-Cholecalciferol (CALCIUM 1000 + D PO) Take 1,000 mg by mouth daily 3/28/18   Historical Provider, MD   cephalexin (KEFLEX) 500 mg capsule Take 1 capsule (500 mg total) by mouth every 6 (six) hours for 7 days  Patient not taking: Reported on 10/3/2019 9/29/19 10/6/19  Michaela Patches, MD   famotidine (PEPCID) 40 MG tablet Take 40 mg by mouth as needed     Historical Provider, MD   ipratropium-albuterol (COMBIVENT RESPIMAT) inhaler Every 6 hours    Historical Provider, MD   loperamide (IMODIUM) 2 mg capsule Take 1 capsule (2 mg total) by mouth 4 (four) times a day as needed for diarrhea  Patient not taking: Reported on 10/2/2019 12/31/18   LEEROY Ramsay   methylPREDNISolone 4 MG tablet therapy pack Use as directed on package  Patient not taking: Reported on 10/3/2019 9/29/19   Jacqueline Gusman MD     I have reviewed home medications with patient personally  Allergies: Allergies   Allergen Reactions    Fluticasone        Social History:  Marital Status:    Occupation:  Retired  Patient Pre-hospital Living Situation:  Resides at home  Patient Pre-hospital Level of Mobility:  Full without assist  Patient Pre-hospital Diet Restrictions:  None  Substance Use History:   Social History     Substance and Sexual Activity   Alcohol Use No    Frequency: Never    Binge frequency: Never     Social History     Tobacco Use   Smoking Status Former Smoker    Last attempt to quit: 2013    Years since quittin 8   Smokeless Tobacco Never Used     Social History     Substance and Sexual Activity   Drug Use No       Family History:  I have reviewed the patients family history    Physical Exam:   Vitals:   Blood Pressure: 111/57 (10/03/19 0045)  Pulse: 70 (10/03/19 0045)  Temperature: 97 6 °F (36 4 °C) (10/03/19 0045)  Temp Source: Temporal (10/03/19 0045)  Respirations: 16 (10/03/19 0045)  Height: 5' (152 4 cm) (10/03/19 0045)  Weight - Scale: 77 6 kg (171 lb 1 2 oz) (10/03/19 0045)  SpO2: 98 % (10/03/19 0130)    Physical Exam   Constitutional: She is oriented to person, place, and time  She appears well-developed and well-nourished  HENT:   Head: Normocephalic and atraumatic  Mouth/Throat: No oropharyngeal exudate  Eyes: Pupils are equal, round, and reactive to light  EOM are normal  No scleral icterus  Neck: Normal range of motion  Neck supple  No JVD present  Cardiovascular: Normal rate, regular rhythm and normal heart sounds  No murmur heard  Pulmonary/Chest: Effort normal and breath sounds normal  No respiratory distress  She has no wheezes  She has no rales  Abdominal: Soft  Bowel sounds are normal  There is no tenderness  There is no rebound and no guarding  Musculoskeletal: Normal range of motion  She exhibits no edema  Lumbar back: She exhibits tenderness  Lymphadenopathy:     She has no cervical adenopathy  Neurological: She is alert and oriented to person, place, and time  She has normal strength and normal reflexes  No sensory deficit  Strength 5/5 bilateral upper and lower extremities with brisk DTRs   Skin: Skin is warm and dry  No rash noted  No erythema  Redness, swelling, tenderness and warmth to touch left elbow   Psychiatric: She has a normal mood and affect  Her behavior is normal    Nursing note and vitals reviewed  Additional Data:   Lab Results: I have personally reviewed pertinent reports  Results from last 7 days   Lab Units 10/02/19  2152   WBC Thousand/uL 10 90*   HEMOGLOBIN g/dL 13 8   HEMATOCRIT % 42 6   PLATELETS Thousands/uL 264   NEUTROS PCT % 79*   LYMPHS PCT % 12*   MONOS PCT % 9   EOS PCT % 0     Results from last 7 days   Lab Units 10/02/19  2152   POTASSIUM mmol/L 3 9   CHLORIDE mmol/L 102   CO2 mmol/L 29   BUN mg/dL 18   CREATININE mg/dL 0 69   CALCIUM mg/dL 8 3*   ALK PHOS U/L 50*   ALT U/L 24   AST U/L 12*                   Imaging: I have personally reviewed pertinent reports  CT abdomen pelvis wo contrast   ED Interpretation by Rebekah Baeza MD (10/02 2300)      Liquid stool within the colon suggestive of colitis  Workstation performed: [id number]         Final Result by Tena Hair DO (10/02 2240)      Liquid stool within the colon suggestive of colitis  Workstation performed: [id number]             EKG, Pathology, and Other Studies Reviewed on Admission:   · EKG: N/A    NetAccess / Epic Records Reviewed: Yes     ** Please Note: This note has been constructed using a voice recognition system   **

## 2019-10-03 NOTE — ED PROVIDER NOTES
History  Chief Complaint   Patient presents with    Weakness - Generalized    Back Pain     53-year-old female comes in complaining of lower extremity weakness bilaterally  Patient was seen here 3 nights ago for the same complaint but she states she is getting progressively weaker and she can no longer ambulate  She rates the pain as a 10/10  Patient also complaining of increasing lower extremity edema as well as diarrhea for 4 days  Patient is being treated with Keflex which started on Monday however her diarrhea started on Sunday evening she describes is a watery continuous diarrhea 20-30 times a day  She has no abdominal pain no nausea or vomiting  Her POA states she has not been able to eat for the past 3 days due to the diarrhea  She has been giving her Gatorade and fluids to try to keep her hydrated  She denies fever chills or rigors  Patient denies any bright red blood per rectum or black tarry stools  She has no chest pain chest pressure heaviness tightness or shortness of breath  The patient is on chronic O2 for COPD  Prior to Admission Medications   Prescriptions Last Dose Informant Patient Reported? Taking?    ALPRAZolam (XANAX) 0 5 mg tablet   Yes No   Sig: Take 0 5 mg by mouth daily at bedtime as needed   Albuterol Sulfate 108 (90 Base) MCG/ACT AEPB   Yes No   Sig: Inhale 2 puffs 3 (three) times a day as needed   Calcium Carb-Cholecalciferol (CALCIUM 1000 + D PO)   Yes No   Sig: Take 1,000 mg by mouth daily   HYDROcodone-acetaminophen (NORCO) 5-325 mg per tablet   No No   Sig: Take 1 tablet by mouth every 6 (six) hours as needed for pain for up to 2 daysMax Daily Amount: 4 tablets   aspirin (ECOTRIN LOW STRENGTH) 81 mg EC tablet   Yes No   Sig: Take 81 mg by mouth Daily   cephalexin (KEFLEX) 500 mg capsule   No No   Sig: Take 1 capsule (500 mg total) by mouth every 6 (six) hours for 7 days   famotidine (PEPCID) 40 MG tablet   Yes No   Sig: Take 40 mg by mouth as needed fluticasone-umeclidinium-vilanterol (TRELEGY ELLIPTA) 100-62 5-25 MCG/INH inhaler   Yes No   Si puff daily   furosemide (LASIX) 20 mg tablet  Self Yes No   Sig: Take 20 mg by mouth daily   ipratropium-albuterol (COMBIVENT RESPIMAT) inhaler   Yes No   Sig: Every 6 hours   loperamide (IMODIUM) 2 mg capsule   No No   Sig: Take 1 capsule (2 mg total) by mouth 4 (four) times a day as needed for diarrhea   methylPREDNISolone 4 MG tablet therapy pack   No No   Sig: Use as directed on package   montelukast (SINGULAIR) 10 mg tablet  Self Yes No   Sig: Take 10 mg by mouth daily at bedtime   potassium chloride (MICRO-K) 10 MEQ CR capsule  Self Yes No   Sig: Take 10 mEq by mouth daily      Facility-Administered Medications: None       Past Medical History:   Diagnosis Date    Asthma     Blurred vision     Cardiac disease     COPD (chronic obstructive pulmonary disease) (HCC)     Diverticulosis     Emphysema lung (HCC)        Past Surgical History:   Procedure Laterality Date    COLON SURGERY      COLONOSCOPY W/ POLYPECTOMY N/A 2019    Procedure: COLONOSCOPY W/ POLYPECTOMY;  Surgeon: Vin Ron MD;  Location: [address] [place] [address] GI LAB; Service: General       No family history on file  I have reviewed and agree with the history as documented  Social History     Tobacco Use    Smoking status: Former Smoker     Last attempt to quit: 2013     Years since quittin 8    Smokeless tobacco: Never Used   Substance Use Topics    Alcohol use: No    Drug use: No        Review of Systems   Constitutional: Positive for fatigue  HENT: Negative  Respiratory: Negative  Cardiovascular: Negative  Gastrointestinal: Positive for diarrhea  Genitourinary: Negative  Musculoskeletal: Positive for back pain and gait problem  Skin: Negative  Neurological: Positive for weakness  Negative for dizziness, seizures, facial asymmetry, light-headedness, numbness and headaches     Psychiatric/Behavioral: Positive for agitation  The patient is nervous/anxious  Physical Exam  Physical Exam   Constitutional: She is oriented to person, place, and time  She appears well-developed and well-nourished  HENT:   Head: Normocephalic and atraumatic  Eyes: Pupils are equal, round, and reactive to light  Conjunctivae and EOM are normal    Neck: Normal range of motion  Neck supple  Cardiovascular: Normal rate and regular rhythm  Murmur heard  Pulmonary/Chest: Effort normal and breath sounds normal    Abdominal: Soft  Bowel sounds are normal    Musculoskeletal: She exhibits tenderness  He patient point tender over LS spine  She has normal mass tone sensation in her lower extremities  She has severe pain with lifting her right leg at about 30° radiating to her back she can only lift her leg at about 510° before severe pain  She has pain with flexion extension turning  Normal sensation strength is equal bilaterally Babinski's are downgoing  She has 1 to 2+ pitting edema bilaterally   Neurological: She is alert and oriented to person, place, and time  Skin: Skin is warm and dry  Vitals reviewed  Vital Signs  ED Triage Vitals   Temp Pulse Resp BP SpO2   -- -- -- -- --      Temp src Heart Rate Source Patient Position - Orthostatic VS BP Location FiO2 (%)   -- -- -- -- --      Pain Score       --           There were no vitals filed for this visit        Visual Acuity      ED Medications  Medications - No data to display    Diagnostic Studies  Results Reviewed     None                 No orders to display              Procedures  ECG 12 Lead Documentation Only  Date/Time: 10/2/2019 10:12 PM  Performed by: Benjamín Rubio MD  Authorized by: Benjamín Rubio MD     Indications / Diagnosis:  Weakness  ECG reviewed by me, the ED Provider: yes    Patient location:  ED  Previous ECG:     Comparison to cardiac monitor: Yes    Interpretation:     Interpretation: abnormal    Rate:     ECG rate:  75    ECG rate assessment: normal    Rhythm:     Rhythm: sinus rhythm    Ectopy:     Ectopy: PVCs      PVCs:  Frequent  QRS:     QRS axis:  Normal  Conduction:     Conduction: normal    ST segments:     ST segments:  Non-specific  T waves:     T waves: non-specific    Q waves:     Q waves:  V5 and V6           ED Course                               MDM    Disposition  Final diagnoses:   None     ED Disposition     None      Follow-up Information    None         Patient's Medications   Discharge Prescriptions    No medications on file     No discharge procedures on file      ED Provider  Electronically Signed by           Monique Jimenez MD  10/02/19 4654       Monique Jimenez MD  10/03/19 8626

## 2019-10-04 VITALS
RESPIRATION RATE: 20 BRPM | HEIGHT: 60 IN | DIASTOLIC BLOOD PRESSURE: 63 MMHG | BODY MASS INDEX: 32.94 KG/M2 | SYSTOLIC BLOOD PRESSURE: 129 MMHG | WEIGHT: 167.77 LBS | OXYGEN SATURATION: 98 % | TEMPERATURE: 97.5 F | HEART RATE: 66 BPM

## 2019-10-04 LAB
ANION GAP SERPL CALCULATED.3IONS-SCNC: 4 MMOL/L (ref 4–13)
BASOPHILS # BLD AUTO: 0 THOUSANDS/ΜL (ref 0–0.1)
BASOPHILS NFR BLD AUTO: 0 % (ref 0–2)
BUN SERPL-MCNC: 9 MG/DL (ref 7–25)
C DIFF TOX GENS STL QL NAA+PROBE: NORMAL
CALCIUM SERPL-MCNC: 8 MG/DL (ref 8.6–10.5)
CAMPYLOBACTER DNA SPEC NAA+PROBE: NORMAL
CHLORIDE SERPL-SCNC: 105 MMOL/L (ref 98–107)
CO2 SERPL-SCNC: 29 MMOL/L (ref 21–31)
CREAT SERPL-MCNC: 0.48 MG/DL (ref 0.6–1.2)
EOSINOPHIL # BLD AUTO: 0 THOUSAND/ΜL (ref 0–0.61)
EOSINOPHIL NFR BLD AUTO: 1 % (ref 0–5)
ERYTHROCYTE [DISTWIDTH] IN BLOOD BY AUTOMATED COUNT: 14.4 % (ref 11.5–14.5)
GFR SERPL CREATININE-BSD FRML MDRD: 92 ML/MIN/1.73SQ M
GLUCOSE SERPL-MCNC: 124 MG/DL (ref 65–99)
HCT VFR BLD AUTO: 38.1 % (ref 42–47)
HGB BLD-MCNC: 12.5 G/DL (ref 12–16)
LYMPHOCYTES # BLD AUTO: 1 THOUSANDS/ΜL (ref 0.6–4.47)
LYMPHOCYTES NFR BLD AUTO: 14 % (ref 21–51)
MCH RBC QN AUTO: 28.5 PG (ref 26–34)
MCHC RBC AUTO-ENTMCNC: 32.7 G/DL (ref 31–37)
MCV RBC AUTO: 87 FL (ref 81–99)
MONOCYTES # BLD AUTO: 0.8 THOUSAND/ΜL (ref 0.17–1.22)
MONOCYTES NFR BLD AUTO: 11 % (ref 2–12)
NEUTROPHILS # BLD AUTO: 5.2 THOUSANDS/ΜL (ref 1.4–6.5)
NEUTS SEG NFR BLD AUTO: 74 % (ref 42–75)
PLATELET # BLD AUTO: 224 THOUSANDS/UL (ref 149–390)
PMV BLD AUTO: 7.3 FL (ref 8.6–11.7)
POTASSIUM SERPL-SCNC: 4.1 MMOL/L (ref 3.5–5.5)
RBC # BLD AUTO: 4.37 MILLION/UL (ref 3.9–5.2)
SALMONELLA DNA SPEC QL NAA+PROBE: NORMAL
SHIGA TOXIN STX GENE SPEC NAA+PROBE: NORMAL
SHIGELLA DNA SPEC QL NAA+PROBE: NORMAL
SODIUM SERPL-SCNC: 138 MMOL/L (ref 134–143)
WBC # BLD AUTO: 7.1 THOUSAND/UL (ref 4.8–10.8)

## 2019-10-04 PROCEDURE — 94640 AIRWAY INHALATION TREATMENT: CPT

## 2019-10-04 PROCEDURE — 94664 DEMO&/EVAL PT USE INHALER: CPT

## 2019-10-04 PROCEDURE — 85025 COMPLETE CBC W/AUTO DIFF WBC: CPT | Performed by: HOSPITALIST

## 2019-10-04 PROCEDURE — 94760 N-INVAS EAR/PLS OXIMETRY 1: CPT

## 2019-10-04 PROCEDURE — 80048 BASIC METABOLIC PNL TOTAL CA: CPT | Performed by: HOSPITALIST

## 2019-10-04 PROCEDURE — 99239 HOSP IP/OBS DSCHRG MGMT >30: CPT | Performed by: HOSPITALIST

## 2019-10-04 RX ADMIN — FUROSEMIDE 20 MG: 20 TABLET ORAL at 08:46

## 2019-10-04 RX ADMIN — POTASSIUM CHLORIDE 20 MEQ: 20 SOLUTION ORAL at 08:57

## 2019-10-04 RX ADMIN — METHYLPREDNISOLONE 20 MG: 4 TABLET ORAL at 08:44

## 2019-10-04 RX ADMIN — CALCIUM CARBONATE-VITAMIN D TAB 500 MG-200 UNIT 2 TABLET: 500-200 TAB at 08:46

## 2019-10-04 RX ADMIN — HEPARIN SODIUM 5000 UNITS: 5000 INJECTION INTRAVENOUS; SUBCUTANEOUS at 06:22

## 2019-10-04 RX ADMIN — ASPIRIN 81 MG: 81 TABLET, COATED ORAL at 08:46

## 2019-10-04 RX ADMIN — FLUTICASONE FUROATE AND VILANTEROL TRIFENATATE 1 PUFF: 100; 25 POWDER RESPIRATORY (INHALATION) at 08:43

## 2019-10-04 RX ADMIN — IPRATROPIUM BROMIDE AND ALBUTEROL SULFATE 3 ML: 2.5; .5 SOLUTION RESPIRATORY (INHALATION) at 07:45

## 2019-10-04 NOTE — PLAN OF CARE
Problem: Potential for Falls  Goal: Patient will remain free of falls  Description  INTERVENTIONS:  - Assess patient frequently for physical needs  -  Identify cognitive and physical deficits and behaviors that affect risk of falls    -  [place] fall precautions as indicated by assessment   - Educate patient/family on patient safety including physical limitations  - Instruct patient to call for assistance with activity based on assessment  - Modify environment to reduce risk of injury  - Consider OT/PT consult to assist with strengthening/mobility  10/4/2019 1026 by Jael Fontenot RN  Outcome: Adequate for Discharge  10/4/2019 1026 by Jael Fontenot RN  Outcome: Progressing  10/4/2019 1023 by Jael Fontenot RN  Outcome: Progressing     Problem: PAIN - ADULT  Goal: Verbalizes/displays adequate comfort level or baseline comfort level  Description  Interventions:  - Encourage patient to monitor pain and request assistance  - Assess pain using appropriate pain scale  - Administer analgesics based on type and severity of pain and evaluate response  - Implement non-pharmacological measures as appropriate and evaluate response  - Consider cultural and social influences on pain and pain management  - Notify physician/advanced practitioner if interventions unsuccessful or patient reports new pain  10/4/2019 1026 by Jael Fontenot RN  Outcome: Adequate for Discharge  10/4/2019 1026 by Jael Fontenot RN  Outcome: Progressing  10/4/2019 1023 by Jael Fontenot RN  Outcome: Progressing     Problem: INFECTION - ADULT  Goal: Absence or prevention of progression during hospitalization  Description  INTERVENTIONS:  - Assess and monitor for signs and symptoms of infection  - Monitor lab/diagnostic results  - Monitor all insertion sites, i e  indwelling lines, tubes, and drains  - Monitor endotracheal if appropriate and nasal secretions for changes in amount and color  - [place] appropriate cooling/warming therapies per order  - Administer medications as ordered  - Instruct and encourage patient and family to use good hand hygiene technique  - Identify and instruct in appropriate isolation precautions for identified infection/condition  10/4/2019 1026 by Claire Marie RN  Outcome: Adequate for Discharge  10/4/2019 1026 by Claire Marie RN  Outcome: Progressing  10/4/2019 1023 by Claire Marie RN  Outcome: Progressing  Goal: Absence of fever/infection during neutropenic period  Description  INTERVENTIONS:  - Monitor WBC    10/4/2019 1026 by Claire Marie RN  Outcome: Adequate for Discharge  10/4/2019 1026 by Claire Marie RN  Outcome: Progressing  10/4/2019 1023 by Claire Marie RN  Outcome: Progressing     Problem: SAFETY ADULT  Goal: Patient will remain free of falls  Description  INTERVENTIONS:  - Assess patient frequently for physical needs  -  Identify cognitive and physical deficits and behaviors that affect risk of falls    -  [place] fall precautions as indicated by assessment   - Educate patient/family on patient safety including physical limitations  - Instruct patient to call for assistance with activity based on assessment  - Modify environment to reduce risk of injury  - Consider OT/PT consult to assist with strengthening/mobility  10/4/2019 1026 by Claire Marie RN  Outcome: Adequate for Discharge  10/4/2019 1026 by Claire Marie RN  Outcome: Progressing  10/4/2019 1023 by Claire Marie RN  Outcome: Progressing  Goal: Maintain or return to baseline ADL function  Description  INTERVENTIONS:  -  Assess patient's ability to carry out ADLs; assess patient's baseline for ADL function and identify physical deficits which impact ability to perform ADLs (bathing, care of mouth/teeth, toileting, grooming, dressing, etc )  - Assess/evaluate cause of self-care deficits   - Assess range of motion  - Assess patient's mobility; develop plan if impaired  - Assess patient's need for assistive devices and provide as appropriate  - Encourage maximum independence but intervene and supervise when necessary  - Involve family in performance of ADLs  - Assess for home care needs following discharge   - Consider OT consult to assist with ADL evaluation and planning for discharge  - Provide patient education as appropriate  10/4/2019 1026 by Devora Escobedo RN  Outcome: Adequate for Discharge  10/4/2019 1026 by Devora Escobedo RN  Outcome: Progressing  10/4/2019 1023 by Devora Escobedo RN  Outcome: Progressing  Goal: Maintain or return mobility status to optimal level  Description  INTERVENTIONS:  - Assess patient's baseline mobility status (ambulation, transfers, stairs, etc )    - Identify cognitive and physical deficits and behaviors that affect mobility  - Identify mobility aids required to assist with transfers and/or ambulation (gait belt, sit-to-stand, lift, walker, cane, etc )  - [place] fall precautions as indicated by assessment  - Record patient progress and toleration of activity level on Mobility SBAR; progress patient to next Phase/Stage  - Instruct patient to call for assistance with activity based on assessment  - Consider rehabilitation consult to assist with strengthening/weightbearing, etc   10/4/2019 1026 by Devora Escobedo RN  Outcome: Adequate for Discharge  10/4/2019 1026 by Devora Escobedo RN  Outcome: Progressing  10/4/2019 1023 by Devora Escobedo RN  Outcome: Progressing     Problem: DISCHARGE PLANNING  Goal: Discharge to home or other facility with appropriate resources  Description  INTERVENTIONS:  - Identify barriers to discharge w/patient and caregiver  - Arrange for needed discharge resources and transportation as appropriate  - Identify discharge learning needs (meds, wound care, etc )  - Arrange for interpretive services to assist at discharge as needed  - Refer to Case Management Department for coordinating discharge planning if the patient needs post-hospital services based on physician/advanced practitioner order or complex needs related to functional status, cognitive ability, or social support system  10/4/2019 1026 by Be Gifford RN  Outcome: Adequate for Discharge  10/4/2019 1026 by Be Gifford RN  Outcome: Progressing  10/4/2019 1023 by Be Gifford RN  Outcome: Progressing     Problem: Knowledge Deficit  Goal: Patient/family/caregiver demonstrates understanding of disease process, treatment plan, medications, and discharge instructions  Description  Complete learning assessment and assess knowledge base    Interventions:  - Provide teaching at level of understanding  - Provide teaching via preferred learning methods  10/4/2019 1026 by Be Gifford RN  Outcome: Adequate for Discharge  10/4/2019 1026 by Be Gifford RN  Outcome: Progressing  10/4/2019 1023 by Be Gifford RN  Outcome: Progressing     Problem: DISCHARGE PLANNING - CARE MANAGEMENT  Goal: Discharge to post-acute care or home with appropriate resources  Description  INTERVENTIONS:  - Conduct assessment to determine patient/family and health care team treatment goals, and need for post-acute services based on payer coverage, community resources, and patient preferences, and barriers to discharge  - Address psychosocial, clinical, and financial barriers to discharge as identified in assessment in conjunction with the patient/family and health care team  - Arrange appropriate level of post-acute services according to patient's   needs and preference and payer coverage in collaboration with the physician and health care team  - Communicate with and update the patient/family, physician, and health care team regarding progress on the discharge plan  - Arrange appropriate transportation to post-acute venues  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

## 2019-10-04 NOTE — DISCHARGE INSTRUCTIONS
Gastroenteritis   WHAT YOU NEED TO KNOW:   Gastroenteritis, or stomach flu, is an infection of the stomach and intestines  DISCHARGE INSTRUCTIONS:   Call 911 for any of the following:   · You have trouble breathing or a very fast pulse  Seek care immediately if:   · You see blood in your diarrhea  · You cannot stop vomiting  · You have not urinated for 12 hours  · You feel like you are going to faint  Contact your healthcare provider if:   · You have a fever  · You continue to vomit or have diarrhea, even after treatment  · You see worms in your diarrhea  · Your mouth or eyes are dry  You are not urinating as much or as often  · You have questions or concerns about your condition or care  Medicines:   · Medicines  may be given to stop vomiting or diarrhea, decrease abdominal cramps, or treat an infection  · Take your medicine as directed  Contact your healthcare provider if you think your medicine is not helping or if you have side effects  Tell him or her if you are allergic to any medicine  Keep a list of the medicines, vitamins, and herbs you take  Include the amounts, and when and why you take them  Bring the list or the pill bottles to follow-up visits  Carry your medicine list with you in case of an emergency  Manage your symptoms:   · Drink liquids as directed  Ask your healthcare provider how much liquid to drink each day, and which liquids are best for you  You may also need to drink an oral rehydration solution (ORS)  An ORS has the right amounts of sugar, salt, and minerals in water to replace body fluids  · Eat bland foods  When you feel hungry, begin eating soft, bland foods  Examples are bananas, clear soup, potatoes, and applesauce  Do not have dairy products, alcohol, sugary drinks, or drinks with caffeine until you feel better  · Rest as much as possible  Slowly start to do more each day when you begin to feel better    Prevent the spread of gastroenteritis:  Gastroenteritis can spread easily  Keep yourself, your family, and your surroundings clean to help prevent the spread of gastroenteritis:  · Wash your hands often  Use soap and water  Wash your hands after you use the bathroom, change a child's diapers, or sneeze  Wash your hands before you prepare or eat food  · Clean surfaces and do laundry often  Wash your clothes and towels separately from the rest of the laundry  Clean surfaces in your home with antibacterial  or bleach  · Clean food thoroughly and cook safely  Wash raw vegetables before you cook  Cook meat, fish, and eggs fully  Do not use the same dishes for raw meat as you do for other foods  Refrigerate any leftover food immediately  · Be aware when you camp or travel  Drink only clean water  Do not drink from rivers or lakes unless you purify or boil the water first  When you travel, drink bottled water and do not add ice  Do not eat fruit that has not been peeled  Do not eat raw fish or meat that is not fully cooked  Follow up with your healthcare provider as directed:  Write down your questions so you remember to ask them during your visits  © 2017 [employer] Information is for End User's use only and may not be sold, redistributed or otherwise used for commercial purposes  All illustrations and images included in CareNotes® are the copyrighted property of [employer]  or [employer]  The above information is an  only  It is not intended as medical advice for individual conditions or treatments  Talk to your doctor, nurse or pharmacist before following any medical regimen to see if it is safe and effective for you

## 2019-10-04 NOTE — OCCUPATIONAL THERAPY NOTE
Approached patient to assess need for treatment before discharge  Patient was dressing to go home and did not need assistance  She declined interventions    ABRAHAM Wright/BONITA

## 2019-10-04 NOTE — NURSING NOTE
Pt discharged to home  IV removed with catheter tip intact  Pressure and dressing applied until bleeding stopped  Dressing CDI  Discharge instructions discussed  No written prescriptions given  Pt verbalized understanding of instructions  Belongings sent with pt  VSS  TRANSFER - OUT REPORT:    Verbal report given to Melvina Schultz RN(name) on Nai Mcginnis  being transferred to ED(unit) for routine post - op       Report consisted of patients Situation, Background, Assessment and   Recommendations(SBAR). Information from the following report(s) SBAR, Procedure Summary, Intake/Output and MAR was reviewed with the receiving nurse. Lines:   Peripheral IV 01/13/17 Left Antecubital (Active)   Site Assessment Clean, dry, & intact 1/13/2017 12:55 PM   Phlebitis Assessment 0 1/13/2017 12:55 PM   Infiltration Assessment 0 1/13/2017 12:55 PM   Dressing Status Clean, dry, & intact 1/13/2017 12:55 PM   Dressing Type Transparent;Tape 1/13/2017 12:55 PM   Hub Color/Line Status Infusing;Pink 1/13/2017 12:55 PM   Alcohol Cap Used Yes 1/13/2017  9:28 AM        Opportunity for questions and clarification was provided.       Patient transported with:   NPTV

## 2019-10-04 NOTE — DISCHARGE SUMMARY
Discharge- Ania Peña 1935, 82 y o  female MRN: [id number]    Unit/Bed#: -02 Encounter: [id number]    Primary Care Provider: Opal Bashir MD   Date and time admitted to hospital: 10/2/2019  9:08 PM        * Colitis presumed infectious  Assessment & Plan  · Diarrheal episodes for the most part have resolved  · Status post a GI evaluation  · GI does not feel that the patient has a colitis  · In retrospect, most likely her symptoms can be explained by a viral gastroenteritis  · Procalcitonin testing is normal  · Stool studies including fecal leukocytes are negative thus far, patient has been afebrile since arrival into the hospital, and currently her belly is benign  · Diet was increased to a full diet this morning, she tolerated it well  · Patient is demanding to be discharged, feels well  · Okay for discharge home on all pre-admit meds at pre-admit dosages    Effusion of bursa of left elbow  Assessment & Plan  · Antibiotics have been discontinued  · Status post an orthopedic evaluation  · Okay from an orthopedic standpoint for discharge  · Patient will be re-evaluated in the office for the possible excision of of the olecranon bursa    Lumbar radiculopathy  Assessment & Plan  · Once again seen by Orthopedics  · No acute phase inpatient workup needed  · Patient is fully ambulatory  · Okay for discharge, will need an outpatient MRI of the L-spine    COPD (chronic obstructive pulmonary disease) ([facility] [address] )  Assessment & Plan  · Stable and without exacerbation  · DC home on all pre-admit meds at pre-admit dosages          Discharging Physician / Practitioner: Armando Juan MD  PCP: Opal Bashir MD  Admission Date:   Admission Orders (From admission, onward)     Ordered        10/03/19 1723  Inpatient Admission  Once         10/02/19 2307  Place in Observation (expected length of stay for this patient is less than two midnights)  Once                   Discharge Date: 10/04/19    Resolved Problems  Date Reviewed: 10/3/2019    None          Consultations During Hospital Stay:  · Orthopedics  · Gastroenterology    Procedures Performed:   · None    Significant Findings / Test Results:   · CT scan abdomen pelvis-liquid stool within the colon suggestive of colitis    Incidental Findings:   · None     Test Results Pending at Discharge (will require follow up): · None     Outpatient Tests Requested:  · None    Complications:     None    Reason for Admission:  Colitis    Hospital Course:     Pastor Garcia is a 82 y o  female patient who originally presented to the hospital on 10/2/2019 due to back pain and diarrhea  Please refer to the initial history and physical examination completed by Lexy WONG for the initial presenting features and complaints  In brief the patient is an 82-year-old female presented to the emergency room with the aforementioned chief complaints  A CT scan of the abdomen and pelvis was performed  Initial findings were suggestive of a colitis as outlined above  Patient was [facility] to [facility]  A GI consultation was obtained  Additionally in view of her chronic back pain and orthopedic evaluation was performed  After GI saw the patient they did not feel that the patient had a colitis  She has a history of functional diarrhea  Additional concerns included the possibility of a viral gastroenteritis  She recently received Keflex for an Olecranon bursitis  Stool for fecal leukocytes was negative  GI had no further concerns, and recommended to increase the diet as tolerated  Additionally recommended to follow up on the stool studies  Antibiotics were discontinued after the patient was seen by Orthopedics  They recommended supportive therapy for the bursitis, they recommended outpatient follow-up in their office    Of note, the patient remained afebrile in-house, did not have a leukocytosis, procalcitonin testing was normal and the patient's symptoms generally improved by time of discharge  She was discharged on all of her other pre-admission medications at the preadmission dosages  She will follow up in the outpatient setting with her primary care physician, and GI on an as-needed basis, and Orthopedics further management of her bursitis  Please see above list of diagnoses and related plan for additional information  Condition at Discharge: good     Discharge Day Visit / Exam:     Subjective:  Patient seen and examined, no new complaints, no distress  Patient is more or less demanding to be discharged home  Vitals: Blood Pressure: 115/58 (10/03/19 2321)  Pulse: 60 (10/03/19 2321)  Temperature: (!) 97 2 °F (36 2 °C) (10/03/19 2321)  Temp Source: Temporal (10/03/19 2321)  Respirations: 18 (10/03/19 2321)  Height: 5' (152 4 cm) (10/03/19 0045)  Weight - Scale: 76 1 kg (167 lb 12 3 oz) (10/04/19 0553)  SpO2: 98 % (10/04/19 0745)  Exam:   Physical Exam   Constitutional: She is oriented to person, place, and time  She appears well-developed and well-nourished  HENT:   Head: Normocephalic and atraumatic  Nose: Nose normal    Mouth/Throat: Oropharynx is clear and moist    Eyes: Pupils are equal, round, and reactive to light  Conjunctivae and EOM are normal    Neck: Normal range of motion  Neck supple  No JVD present  No thyromegaly present  Cardiovascular: Normal rate, regular rhythm and intact distal pulses  Exam reveals no gallop and no friction rub  No murmur heard  Pulmonary/Chest: Effort normal and breath sounds normal  No respiratory distress  Abdominal: Soft  Bowel sounds are normal  She exhibits no distension and no mass  There is no tenderness  There is no guarding  Musculoskeletal: Normal range of motion  She exhibits no edema  Left elbow olecranon bursa noted   Lymphadenopathy:     She has no cervical adenopathy  Neurological: She is alert and oriented to person, place, and time  No cranial nerve deficit  Skin: Skin is warm  No rash noted  No erythema  Psychiatric: She has a normal mood and affect  Her behavior is normal    Vitals reviewed  Discussion with Family:  No family members were present at the time of my discharge evaluation, nor did the patient want me to call anyone    Discharge instructions/Information to patient and family:   See after visit summary for information provided to patient and family  Provisions for Follow-Up Care:  See after visit summary for information related to follow-up care and any pertinent home health orders  Disposition:     Home    For Discharges to   [address] [facility]:   · Not Applicable to this Patient - Not Applicable to this Patient    Planned Readmission:    None     Discharge Statement:  I spent 40 minutes discharging the patient  This time was spent on the day of discharge  I had direct contact with the patient on the day of discharge  Greater than 50% of the total time was spent examining patient, answering all patient questions, arranging and discussing plan of care with patient as well as directly providing post-discharge instructions  Additional time then spent on discharge activities  Discharge Medications:  See after visit summary for reconciled discharge medications provided to patient and family        ** Please Note: This note has been constructed using a voice recognition system **

## 2019-10-04 NOTE — ASSESSMENT & PLAN NOTE
· Diarrheal episodes for the most part have resolved  · Status post a GI evaluation  · GI does not feel that the patient has a colitis  · In retrospect, most likely her symptoms can be explained by a viral gastroenteritis  · Procalcitonin testing is normal  · Stool studies including fecal leukocytes are negative thus far, patient has been afebrile since arrival into the hospital, and currently her belly is benign  · Diet was increased to a full diet this morning, she tolerated it well  · Patient is demanding to be discharged, feels well  · Okay for discharge home on all pre-admit meds at pre-admit dosages

## 2019-10-04 NOTE — PLAN OF CARE
Problem: Potential for Falls  Goal: Patient will remain free of falls  Description  INTERVENTIONS:  - Assess patient frequently for physical needs  -  Identify cognitive and physical deficits and behaviors that affect risk of falls    -  [place] fall precautions as indicated by assessment   - Educate patient/family on patient safety including physical limitations  - Instruct patient to call for assistance with activity based on assessment  - Modify environment to reduce risk of injury  - Consider OT/PT consult to assist with strengthening/mobility  10/4/2019 1026 by Rahul Johnson RN  Outcome: Progressing  10/4/2019 1023 by Rahul Johnson RN  Outcome: Progressing     Problem: PAIN - ADULT  Goal: Verbalizes/displays adequate comfort level or baseline comfort level  Description  Interventions:  - Encourage patient to monitor pain and request assistance  - Assess pain using appropriate pain scale  - Administer analgesics based on type and severity of pain and evaluate response  - Implement non-pharmacological measures as appropriate and evaluate response  - Consider cultural and social influences on pain and pain management  - Notify physician/advanced practitioner if interventions unsuccessful or patient reports new pain  10/4/2019 1026 by Rahul Johnson RN  Outcome: Progressing  10/4/2019 1023 by Rahul Johnson RN  Outcome: Progressing     Problem: INFECTION - ADULT  Goal: Absence or prevention of progression during hospitalization  Description  INTERVENTIONS:  - Assess and monitor for signs and symptoms of infection  - Monitor lab/diagnostic results  - Monitor all insertion sites, i e  indwelling lines, tubes, and drains  - Monitor endotracheal if appropriate and nasal secretions for changes in amount and color  - [place] appropriate cooling/warming therapies per order  - Administer medications as ordered  - Instruct and encourage patient and family to use good hand hygiene technique  - Identify and instruct in appropriate isolation precautions for identified infection/condition  10/4/2019 1026 by Olya Yanez RN  Outcome: Progressing  10/4/2019 1023 by Olya Yanez RN  Outcome: Progressing  Goal: Absence of fever/infection during neutropenic period  Description  INTERVENTIONS:  - Monitor WBC    10/4/2019 1026 by Olya Yanez RN  Outcome: Progressing  10/4/2019 1023 by Olya Yanez RN  Outcome: Progressing     Problem: SAFETY ADULT  Goal: Patient will remain free of falls  Description  INTERVENTIONS:  - Assess patient frequently for physical needs  -  Identify cognitive and physical deficits and behaviors that affect risk of falls    -  [place] fall precautions as indicated by assessment   - Educate patient/family on patient safety including physical limitations  - Instruct patient to call for assistance with activity based on assessment  - Modify environment to reduce risk of injury  - Consider OT/PT consult to assist with strengthening/mobility  10/4/2019 1026 by Olya Yanez RN  Outcome: Progressing  10/4/2019 1023 by Olya Yanez RN  Outcome: Progressing  Goal: Maintain or return to baseline ADL function  Description  INTERVENTIONS:  -  Assess patient's ability to carry out ADLs; assess patient's baseline for ADL function and identify physical deficits which impact ability to perform ADLs (bathing, care of mouth/teeth, toileting, grooming, dressing, etc )  - Assess/evaluate cause of self-care deficits   - Assess range of motion  - Assess patient's mobility; develop plan if impaired  - Assess patient's need for assistive devices and provide as appropriate  - Encourage maximum independence but intervene and supervise when necessary  - Involve family in performance of ADLs  - Assess for home care needs following discharge   - Consider OT consult to assist with ADL evaluation and planning for discharge  - Provide patient education as appropriate  10/4/2019 1026 by Olya Yanez RN  Outcome: Progressing  10/4/2019 1023 by Chinmay Bueno RN  Outcome: Progressing  Goal: Maintain or return mobility status to optimal level  Description  INTERVENTIONS:  - Assess patient's baseline mobility status (ambulation, transfers, stairs, etc )    - Identify cognitive and physical deficits and behaviors that affect mobility  - Identify mobility aids required to assist with transfers and/or ambulation (gait belt, sit-to-stand, lift, walker, cane, etc )  - [place] fall precautions as indicated by assessment  - Record patient progress and toleration of activity level on Mobility SBAR; progress patient to next Phase/Stage  - Instruct patient to call for assistance with activity based on assessment  - Consider rehabilitation consult to assist with strengthening/weightbearing, etc   10/4/2019 1026 by Chinmay Bueno RN  Outcome: Progressing  10/4/2019 1023 by Chinmay Bueno RN  Outcome: Progressing     Problem: DISCHARGE PLANNING  Goal: Discharge to home or other facility with appropriate resources  Description  INTERVENTIONS:  - Identify barriers to discharge w/patient and caregiver  - Arrange for needed discharge resources and transportation as appropriate  - Identify discharge learning needs (meds, wound care, etc )  - Arrange for interpretive services to assist at discharge as needed  - Refer to Case Management Department for coordinating discharge planning if the patient needs post-hospital services based on physician/advanced practitioner order or complex needs related to functional status, cognitive ability, or social support system  10/4/2019 1026 by Chinmay Bueno RN  Outcome: Progressing  10/4/2019 1023 by Chinmay Bueno RN  Outcome: Progressing     Problem: Knowledge Deficit  Goal: Patient/family/caregiver demonstrates understanding of disease process, treatment plan, medications, and discharge instructions  Description  Complete learning assessment and assess knowledge base   Interventions:  - Provide teaching at level of understanding  - Provide teaching via preferred learning methods  10/4/2019 1026 by Carlos Koehler RN  Outcome: Progressing  10/4/2019 1023 by Carlos Koehler RN  Outcome: Progressing     Problem: DISCHARGE PLANNING - CARE MANAGEMENT  Goal: Discharge to post-acute care or home with appropriate resources  Description  INTERVENTIONS:  - Conduct assessment to determine patient/family and health care team treatment goals, and need for post-acute services based on payer coverage, community resources, and patient preferences, and barriers to discharge  - Address psychosocial, clinical, and financial barriers to discharge as identified in assessment in conjunction with the patient/family and health care team  - Arrange appropriate level of post-acute services according to patient's   needs and preference and payer coverage in collaboration with the physician and health care team  - Communicate with and update the patient/family, physician, and health care team regarding progress on the discharge plan  - Arrange appropriate transportation to post-acute venues  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

## 2019-10-04 NOTE — PLAN OF CARE
Problem: Potential for Falls  Goal: Patient will remain free of falls  Description  INTERVENTIONS:  - Assess patient frequently for physical needs  -  Identify cognitive and physical deficits and behaviors that affect risk of falls    -  [place] fall precautions as indicated by assessment   - Educate patient/family on patient safety including physical limitations  - Instruct patient to call for assistance with activity based on assessment  - Modify environment to reduce risk of injury  - Consider OT/PT consult to assist with strengthening/mobility  Outcome: Progressing     Problem: PAIN - ADULT  Goal: Verbalizes/displays adequate comfort level or baseline comfort level  Description  Interventions:  - Encourage patient to monitor pain and request assistance  - Assess pain using appropriate pain scale  - Administer analgesics based on type and severity of pain and evaluate response  - Implement non-pharmacological measures as appropriate and evaluate response  - Consider cultural and social influences on pain and pain management  - Notify physician/advanced practitioner if interventions unsuccessful or patient reports new pain  Outcome: Progressing     Problem: INFECTION - ADULT  Goal: Absence or prevention of progression during hospitalization  Description  INTERVENTIONS:  - Assess and monitor for signs and symptoms of infection  - Monitor lab/diagnostic results  - Monitor all insertion sites, i e  indwelling lines, tubes, and drains  - Monitor endotracheal if appropriate and nasal secretions for changes in amount and color  - [place] appropriate cooling/warming therapies per order  - Administer medications as ordered  - Instruct and encourage patient and family to use good hand hygiene technique  - Identify and instruct in appropriate isolation precautions for identified infection/condition  Outcome: Progressing  Goal: Absence of fever/infection during neutropenic period  Description  INTERVENTIONS:  - Monitor WBC    Outcome: Progressing     Problem: SAFETY ADULT  Goal: Patient will remain free of falls  Description  INTERVENTIONS:  - Assess patient frequently for physical needs  -  Identify cognitive and physical deficits and behaviors that affect risk of falls    -  [place] fall precautions as indicated by assessment   - Educate patient/family on patient safety including physical limitations  - Instruct patient to call for assistance with activity based on assessment  - Modify environment to reduce risk of injury  - Consider OT/PT consult to assist with strengthening/mobility  Outcome: Progressing  Goal: Maintain or return to baseline ADL function  Description  INTERVENTIONS:  -  Assess patient's ability to carry out ADLs; assess patient's baseline for ADL function and identify physical deficits which impact ability to perform ADLs (bathing, care of mouth/teeth, toileting, grooming, dressing, etc )  - Assess/evaluate cause of self-care deficits   - Assess range of motion  - Assess patient's mobility; develop plan if impaired  - Assess patient's need for assistive devices and provide as appropriate  - Encourage maximum independence but intervene and supervise when necessary  - Involve family in performance of ADLs  - Assess for home care needs following discharge   - Consider OT consult to assist with ADL evaluation and planning for discharge  - Provide patient education as appropriate  Outcome: Progressing  Goal: Maintain or return mobility status to optimal level  Description  INTERVENTIONS:  - Assess patient's baseline mobility status (ambulation, transfers, stairs, etc )    - Identify cognitive and physical deficits and behaviors that affect mobility  - Identify mobility aids required to assist with transfers and/or ambulation (gait belt, sit-to-stand, lift, walker, cane, etc )  - [place] fall precautions as indicated by assessment  - Record patient progress and toleration of activity level on Mobility SBAR; progress patient to next Phase/Stage  - Instruct patient to call for assistance with activity based on assessment  - Consider rehabilitation consult to assist with strengthening/weightbearing, etc   Outcome: Progressing     Problem: DISCHARGE PLANNING  Goal: Discharge to home or other facility with appropriate resources  Description  INTERVENTIONS:  - Identify barriers to discharge w/patient and caregiver  - Arrange for needed discharge resources and transportation as appropriate  - Identify discharge learning needs (meds, wound care, etc )  - Arrange for interpretive services to assist at discharge as needed  - Refer to Case Management Department for coordinating discharge planning if the patient needs post-hospital services based on physician/advanced practitioner order or complex needs related to functional status, cognitive ability, or social support system  Outcome: Progressing     Problem: Knowledge Deficit  Goal: Patient/family/caregiver demonstrates understanding of disease process, treatment plan, medications, and discharge instructions  Description  Complete learning assessment and assess knowledge base    Interventions:  - Provide teaching at level of understanding  - Provide teaching via preferred learning methods  Outcome: Progressing     Problem: DISCHARGE PLANNING - CARE MANAGEMENT  Goal: Discharge to post-acute care or home with appropriate resources  Description  INTERVENTIONS:  - Conduct assessment to determine patient/family and health care team treatment goals, and need for post-acute services based on payer coverage, community resources, and patient preferences, and barriers to discharge  - Address psychosocial, clinical, and financial barriers to discharge as identified in assessment in conjunction with the patient/family and health care team  - Arrange appropriate level of post-acute services according to patient's   needs and preference and payer coverage in collaboration with the physician and health care team  - Communicate with and update the patient/family, physician, and health care team regarding progress on the discharge plan  - Arrange appropriate transportation to post-acute venues  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

## 2019-10-04 NOTE — ASSESSMENT & PLAN NOTE
· Once again seen by Orthopedics  · No acute phase inpatient workup needed  · Patient is fully ambulatory  · Okay for discharge, will need an outpatient MRI of the L-spine

## 2019-10-04 NOTE — ASSESSMENT & PLAN NOTE
· Antibiotics have been discontinued  · Status post an orthopedic evaluation  · Okay from an orthopedic standpoint for discharge  · Patient will be re-evaluated in the office for the possible excision of of the olecranon bursa

## 2019-10-04 NOTE — UTILIZATION REVIEW
OBSERVATION 10/2 @ 2307 CONVERTED TO INPATIENT ADMISSION 10/3 @ 1723 DUE TO >2 MN STAY REQUIRED TO CARE FOR PT WITH PRESUMED INFECTIOUS DIARRHEA, LEUKOCYTOSIS, RISK FOR CDIFF COLITIS NEEDING CONTINUED IVF, IV PAIN CONTROL, STOOL STUDIES PENDING     10/03/19 1723  Inpatient Admission Once     Transfer Service: General Medicine       Question Answer Comment   Admitting Physician Dasha Limon    Level of Care Med Surg    Estimated length of stay More than 2 Midnights    Certification I certify that inpatient services are medically necessary for this patient for a duration of greater than two midnights  See H&P and MD Progress Notes for additional information about the patient's course of treatment          10/03/19 1723

## 2019-10-04 NOTE — CONSULTS
Consultation - Apple Meza 82 y o  female MRN: [id number]  Unit/Bed#: -02 Encounter: [id number]      Assessment/Plan     Assessment:  Elbow swelling hand low back pain    Plan:  If conservative treatment fails then the olecranon bursa can be excised and she needs an MRI of the lumbar spine to assess L4 compression fracture    History of Present Illness   Physician Requesting Consult: Selvin Howard MD  Reason for Consult / Principal Problem:  Elbow swelling and low back pain with radiation down the left leg  HPI: Sravani Nava is a 82y o  year old female who presents with swollen and painful  elbow and acute onset of low back pain with ambulatory dysfunction  Patient was seen in the ER couple of times and she was started on Keflex following telephone conversation with the her olecranon bursa responded to antibiotic and it has calmed down but it looks chronic on the x-rays and may need excision of olecranon bursa considering that the patient is diabetic and has been given steroids for low back pain  She does not recall any injury to her lower back the pain is being persistent and she has difficulty in walking when she stands she is hunched forward she has tenderness on palpation of the lower spine and pain radiates down the left leg till the knee level  She says she lives on her own does not use any walker to mobilize  She has no bowel and bladder dysfunction however recently she has been having diarrhea  Today she is very sleepy and is difficult historian  She points to her lower back and left side has painful and she has difficulty in turning in bed      Consults    Review of Systems    Historical Information   Past Medical History:   Diagnosis Date    Asthma     Blurred vision     Cardiac disease     [facility]     Diverticulosis     [facility]      Past Surgical History:   Procedure Laterality Date    COLON SURGERY  COLONOSCOPY W/ POLYPECTOMY N/A 2019    Procedure: COLONOSCOPY W/ POLYPECTOMY;  Surgeon: Thalia Teresa MD;  Location: [facility];   Service: General     Social History   Social History     Substance and Sexual Activity   Alcohol Use Not Currently    Frequency: Never    Binge frequency: Never     Social History     Substance and Sexual Activity   Drug Use No     Social History     Tobacco Use   Smoking Status Former Smoker    Last attempt to quit: 2013    Years since quittin 8   Smokeless Tobacco Never Used     Family History: non-contributory    Meds/Allergies   current meds:   Current Facility-Administered Medications   Medication Dose Route Frequency    acetaminophen (TYLENOL) tablet 650 mg  650 mg Oral Q6H PRN    ALPRAZolam (XANAX) tablet 0 5 mg  0 5 mg Oral HS PRN    aspirin (ECOTRIN LOW STRENGTH) EC tablet 81 mg  81 mg Oral Daily    calcium carbonate-vitamin D (OSCAL-D) 500 mg-200 units per tablet 2 tablet  2 tablet Oral Daily With Breakfast    famotidine (PEPCID) tablet 20 mg  20 mg Oral PRN    fluticasone-vilanterol (BREO ELLIPTA) 100-25 mcg/inh inhaler 1 puff  1 puff Inhalation Daily    furosemide (LASIX) tablet 20 mg  20 mg Oral Daily    heparin (porcine) subcutaneous injection 5,000 Units  5,000 Units Subcutaneous Q8H [address]    HYDROcodone-acetaminophen (NORCO) 5-325 mg per tablet 1 tablet  1 tablet Oral Q6H PRN    ipratropium-albuterol (DUO-NEB) 0 5-2 5 mg/3 mL inhalation solution 3 mL  3 mL Nebulization Q6H While awake    ketorolac (TORADOL) injection 15 mg  15 mg Intravenous Q6H PRN    [START ON 10/4/2019] methylprednisolone (MEDROL) tablet 20 mg  20 mg Oral Daily    Followed by   Zara Call ON 10/5/2019] methylprednisolone (MEDROL) tablet 16 mg  16 mg Oral Daily    Followed by   Zara Call ON 10/6/2019] methylprednisolone (MEDROL) tablet 12 mg  12 mg Oral Daily    Followed by   Zara Call ON 10/7/2019] methylprednisolone (MEDROL) tablet 8 mg  8 mg Oral Daily    Followed by  [START ON 10/8/2019] methylprednisolone (MEDROL) tablet 4 mg  4 mg Oral Daily    montelukast (SINGULAIR) tablet 10 mg  10 mg Oral HS    nitroglycerin (NITROSTAT) SL tablet 0 4 mg  0 4 mg Sublingual Q5 Min PRN    ondansetron (ZOFRAN) injection 4 mg  4 mg Intravenous Q6H PRN    potassium chloride 10 % oral solution 20 mEq  20 mEq Oral Daily    sodium chloride 0 9 % infusion  100 mL/hr Intravenous Continuous     Allergies   Allergen Reactions    Fluticasone        Objective   Vitals: Blood pressure 137/81, pulse (!) 50, temperature 97 8 °F (36 6 °C), temperature source Tympanic, resp  rate 20, height 5' (1 524 m), weight 78 5 kg (173 lb 1 oz), SpO2 98 %  ,Body mass index is 33 8 kg/m²  No intake or output data in the 24 hours ending 10/03/19 2126  No intake/output data recorded  Invasive Devices     None                 Physical Exam  Back Exam     Tenderness   The patient is experiencing tenderness in the lumbar      Range of Motion   Extension: abnormal   Flexion: abnormal   Lateral bend right: abnormal   Lateral bend left: abnormal   Rotation right: abnormal   Rotation left: abnormal     Muscle Strength   Right Quadriceps:  5/5   Left Quadriceps:  4/5   Right Hamstrings:  5/5   Left Hamstrings:  4/5     Tests   Straight leg raise right: negative  Straight leg raise left: positive    Reflexes   Patellar: Hyporeflexic  Achilles: Hyporeflexic  Biceps: Hyporeflexic  Babinski's sign: normal     Other   Toe walk: abnormal  Heel walk: abnormal  Sensation: decreased    Comments:  Patient is very sleepy today and is not cooperative for examination however SLR is 90 with no tension sign testing for the power of the muscles of feet there grade 5 and flexion of the left knee is weaker than the right extension is weaker than the right on the left side reflexes are decreased and just palpable there is varicose veins present in both legs dorsal pedis arteries palpable foot is warm to touch internal-external rotation of the hips are pain-free  Abdomen is soft nontender bowel sounds are audible and there is no tenderness present over the paralumbar region  Lab Results: I have personally reviewed pertinent lab results  Imaging Studies: I have personally reviewed pertinent films in PACS  EKG, Pathology, and Other Studies: I have personally reviewed pertinent films in PACS  VTE Prophylaxis: Sequential compression device (Venodyne)     Code Status: Level 1 - Full Code  Advance Directive and Living Will:      Power of :    POLST:      Counseling / Coordination of Care  Total floor / unit time spent today 60 minutes  Greater than 50% of total time was spent with the patient and / or family counseling and / or coordination of care  A description of the counseling / coordination of care:  Patient would benefit from MRI of the lumbar spine if she wants any intervention at the same time she could be followed once her pain is resolved and she is mobilizing with physical therapy in the outpatient spine  For the olecranon bursa injury may need excision if it gives her discomfort and presented pain is improving as she is on the steroids

## 2019-10-06 ENCOUNTER — HOSPITAL ENCOUNTER (INPATIENT)
Facility: HOSPITAL | Age: 84
LOS: 2 days | Discharge: HOME | DRG: 309 | End: 2019-10-08
Attending: INTERNAL MEDICINE | Admitting: INTERNAL MEDICINE
Payer: MEDICARE

## 2019-10-06 ENCOUNTER — APPOINTMENT (EMERGENCY)
Dept: RADIOLOGY | Facility: HOSPITAL | Age: 84
DRG: 309 | End: 2019-10-06
Payer: MEDICARE

## 2019-10-06 DIAGNOSIS — M54.40 CHRONIC LOW BACK PAIN WITH SCIATICA, SCIATICA LATERALITY UNSPECIFIED, UNSPECIFIED BACK PAIN LATERALITY: ICD-10-CM

## 2019-10-06 DIAGNOSIS — M25.522 LEFT ELBOW PAIN: ICD-10-CM

## 2019-10-06 DIAGNOSIS — G89.29 CHRONIC LOW BACK PAIN WITH SCIATICA, SCIATICA LATERALITY UNSPECIFIED, UNSPECIFIED BACK PAIN LATERALITY: ICD-10-CM

## 2019-10-06 DIAGNOSIS — I48.91 ATRIAL FIBRILLATION WITH RAPID VENTRICULAR RESPONSE (HCC): Primary | ICD-10-CM

## 2019-10-06 LAB
ALBUMIN SERPL BCP-MCNC: 3.5 G/DL (ref 3.5–5.7)
ALP SERPL-CCNC: 51 U/L (ref 55–165)
ALT SERPL W P-5'-P-CCNC: 32 U/L (ref 7–52)
ANION GAP SERPL CALCULATED.3IONS-SCNC: 11 MMOL/L (ref 4–13)
AST SERPL W P-5'-P-CCNC: 16 U/L (ref 13–39)
BACTERIA UR QL AUTO: ABNORMAL /HPF
BASOPHILS # BLD AUTO: 0 THOUSANDS/ΜL (ref 0–0.1)
BASOPHILS NFR BLD AUTO: 0 % (ref 0–2)
BILIRUB SERPL-MCNC: 0.4 MG/DL (ref 0.2–1)
BILIRUB UR QL STRIP: NEGATIVE
BUN SERPL-MCNC: 17 MG/DL (ref 7–25)
CALCIUM SERPL-MCNC: 9.1 MG/DL (ref 8.6–10.5)
CHLORIDE SERPL-SCNC: 96 MMOL/L (ref 98–107)
CLARITY UR: CLEAR
CO2 SERPL-SCNC: 30 MMOL/L (ref 21–31)
COLOR UR: YELLOW
CREAT SERPL-MCNC: 0.57 MG/DL (ref 0.6–1.2)
EOSINOPHIL # BLD AUTO: 0.1 THOUSAND/ΜL (ref 0–0.61)
EOSINOPHIL NFR BLD AUTO: 1 % (ref 0–5)
ERYTHROCYTE [DISTWIDTH] IN BLOOD BY AUTOMATED COUNT: 14.5 % (ref 11.5–14.5)
GFR SERPL CREATININE-BSD FRML MDRD: 87 ML/MIN/1.73SQ M
GLUCOSE SERPL-MCNC: 130 MG/DL (ref 65–99)
GLUCOSE UR STRIP-MCNC: NEGATIVE MG/DL
HCT VFR BLD AUTO: 44.2 % (ref 42–47)
HGB BLD-MCNC: 14.1 G/DL (ref 12–16)
HGB UR QL STRIP.AUTO: ABNORMAL
KETONES UR STRIP-MCNC: NEGATIVE MG/DL
LACTATE SERPL-SCNC: 1.5 MMOL/L (ref 0.5–2)
LEUKOCYTE ESTERASE UR QL STRIP: ABNORMAL
LYMPHOCYTES # BLD AUTO: 1.5 THOUSANDS/ΜL (ref 0.6–4.47)
LYMPHOCYTES NFR BLD AUTO: 15 % (ref 21–51)
MAGNESIUM SERPL-MCNC: 1.9 MG/DL (ref 1.9–2.7)
MCH RBC QN AUTO: 28 PG (ref 26–34)
MCHC RBC AUTO-ENTMCNC: 31.9 G/DL (ref 31–37)
MCV RBC AUTO: 88 FL (ref 81–99)
MONOCYTES # BLD AUTO: 0.9 THOUSAND/ΜL (ref 0.17–1.22)
MONOCYTES NFR BLD AUTO: 9 % (ref 2–12)
NEUTROPHILS # BLD AUTO: 7.5 THOUSANDS/ΜL (ref 1.4–6.5)
NEUTS SEG NFR BLD AUTO: 75 % (ref 42–75)
NITRITE UR QL STRIP: NEGATIVE
NON-SQ EPI CELLS URNS QL MICRO: ABNORMAL /HPF
OTHER STN SPEC: ABNORMAL
PH UR STRIP.AUTO: 6 [PH]
PLATELET # BLD AUTO: 296 THOUSANDS/UL (ref 149–390)
PMV BLD AUTO: 7.5 FL (ref 8.6–11.7)
POTASSIUM SERPL-SCNC: 3.7 MMOL/L (ref 3.5–5.5)
PROT SERPL-MCNC: 6.3 G/DL (ref 6.4–8.9)
PROT UR STRIP-MCNC: NEGATIVE MG/DL
RBC # BLD AUTO: 5.04 MILLION/UL (ref 3.9–5.2)
RBC #/AREA URNS AUTO: ABNORMAL /HPF
SODIUM SERPL-SCNC: 137 MMOL/L (ref 134–143)
SP GR UR STRIP.AUTO: 1.01 (ref 1–1.03)
TROPONIN I SERPL-MCNC: 0.03 NG/ML
TROPONIN I SERPL-MCNC: 0.03 NG/ML
TROPONIN I SERPL-MCNC: <0.03 NG/ML
UROBILINOGEN UR QL STRIP.AUTO: 0.2 E.U./DL
WBC # BLD AUTO: 10 THOUSAND/UL (ref 4.8–10.8)
WBC #/AREA URNS AUTO: ABNORMAL /HPF

## 2019-10-06 PROCEDURE — 85025 COMPLETE CBC W/AUTO DIFF WBC: CPT

## 2019-10-06 PROCEDURE — 84484 ASSAY OF TROPONIN QUANT: CPT | Performed by: INTERNAL MEDICINE

## 2019-10-06 PROCEDURE — 96375 TX/PRO/DX INJ NEW DRUG ADDON: CPT

## 2019-10-06 PROCEDURE — 96374 THER/PROPH/DIAG INJ IV PUSH: CPT

## 2019-10-06 PROCEDURE — 87040 BLOOD CULTURE FOR BACTERIA: CPT

## 2019-10-06 PROCEDURE — 84484 ASSAY OF TROPONIN QUANT: CPT

## 2019-10-06 PROCEDURE — 99222 1ST HOSP IP/OBS MODERATE 55: CPT | Performed by: INTERNAL MEDICINE

## 2019-10-06 PROCEDURE — 83735 ASSAY OF MAGNESIUM: CPT | Performed by: INTERNAL MEDICINE

## 2019-10-06 PROCEDURE — 83605 ASSAY OF LACTIC ACID: CPT

## 2019-10-06 PROCEDURE — 94760 N-INVAS EAR/PLS OXIMETRY 1: CPT

## 2019-10-06 PROCEDURE — 80053 COMPREHEN METABOLIC PANEL: CPT

## 2019-10-06 PROCEDURE — 94640 AIRWAY INHALATION TREATMENT: CPT

## 2019-10-06 PROCEDURE — 99285 EMERGENCY DEPT VISIT HI MDM: CPT

## 2019-10-06 PROCEDURE — 36415 COLL VENOUS BLD VENIPUNCTURE: CPT

## 2019-10-06 PROCEDURE — 71045 X-RAY EXAM CHEST 1 VIEW: CPT

## 2019-10-06 PROCEDURE — 81001 URINALYSIS AUTO W/SCOPE: CPT

## 2019-10-06 PROCEDURE — 99223 1ST HOSP IP/OBS HIGH 75: CPT | Performed by: INTERNAL MEDICINE

## 2019-10-06 PROCEDURE — 96365 THER/PROPH/DIAG IV INF INIT: CPT

## 2019-10-06 PROCEDURE — 93005 ELECTROCARDIOGRAM TRACING: CPT

## 2019-10-06 RX ORDER — HYDROCODONE BITARTRATE AND ACETAMINOPHEN 5; 325 MG/1; MG/1
1 TABLET ORAL EVERY 6 HOURS PRN
Status: DISCONTINUED | OUTPATIENT
Start: 2019-10-06 | End: 2019-10-08 | Stop reason: HOSPADM

## 2019-10-06 RX ORDER — DILTIAZEM HYDROCHLORIDE 5 MG/ML
20 INJECTION INTRAVENOUS ONCE
Status: COMPLETED | OUTPATIENT
Start: 2019-10-06 | End: 2019-10-06

## 2019-10-06 RX ORDER — DIGOXIN 0.25 MG/ML
250 INJECTION INTRAMUSCULAR; INTRAVENOUS ONCE
Status: COMPLETED | OUTPATIENT
Start: 2019-10-06 | End: 2019-10-06

## 2019-10-06 RX ORDER — CEPHALEXIN 500 MG/1
500 CAPSULE ORAL EVERY 12 HOURS SCHEDULED
Status: DISCONTINUED | OUTPATIENT
Start: 2019-10-06 | End: 2019-10-08 | Stop reason: HOSPADM

## 2019-10-06 RX ORDER — ACETAMINOPHEN 325 MG/1
650 TABLET ORAL EVERY 6 HOURS PRN
Status: DISCONTINUED | OUTPATIENT
Start: 2019-10-06 | End: 2019-10-08 | Stop reason: HOSPADM

## 2019-10-06 RX ORDER — POTASSIUM CHLORIDE 20 MEQ/1
20 TABLET, EXTENDED RELEASE ORAL ONCE
Status: COMPLETED | OUTPATIENT
Start: 2019-10-06 | End: 2019-10-06

## 2019-10-06 RX ORDER — KETOROLAC TROMETHAMINE 30 MG/ML
15 INJECTION, SOLUTION INTRAMUSCULAR; INTRAVENOUS EVERY 6 HOURS PRN
Status: DISCONTINUED | OUTPATIENT
Start: 2019-10-06 | End: 2019-10-08 | Stop reason: HOSPADM

## 2019-10-06 RX ORDER — ALPRAZOLAM 0.5 MG/1
0.5 TABLET ORAL
Status: DISCONTINUED | OUTPATIENT
Start: 2019-10-06 | End: 2019-10-08 | Stop reason: HOSPADM

## 2019-10-06 RX ORDER — ASPIRIN 81 MG/1
81 TABLET ORAL DAILY
Status: DISCONTINUED | OUTPATIENT
Start: 2019-10-06 | End: 2019-10-07

## 2019-10-06 RX ORDER — FUROSEMIDE 20 MG/1
20 TABLET ORAL DAILY
Status: DISCONTINUED | OUTPATIENT
Start: 2019-10-07 | End: 2019-10-08 | Stop reason: HOSPADM

## 2019-10-06 RX ORDER — MONTELUKAST SODIUM 10 MG/1
10 TABLET ORAL
Status: DISCONTINUED | OUTPATIENT
Start: 2019-10-06 | End: 2019-10-08 | Stop reason: HOSPADM

## 2019-10-06 RX ORDER — MAGNESIUM HYDROXIDE/ALUMINUM HYDROXICE/SIMETHICONE 120; 1200; 1200 MG/30ML; MG/30ML; MG/30ML
30 SUSPENSION ORAL ONCE
Status: COMPLETED | OUTPATIENT
Start: 2019-10-06 | End: 2019-10-06

## 2019-10-06 RX ORDER — ONDANSETRON 2 MG/ML
4 INJECTION INTRAMUSCULAR; INTRAVENOUS ONCE
Status: COMPLETED | OUTPATIENT
Start: 2019-10-06 | End: 2019-10-06

## 2019-10-06 RX ORDER — FENTANYL CITRATE 50 UG/ML
50 INJECTION, SOLUTION INTRAMUSCULAR; INTRAVENOUS ONCE
Status: COMPLETED | OUTPATIENT
Start: 2019-10-06 | End: 2019-10-06

## 2019-10-06 RX ORDER — BUDESONIDE AND FORMOTEROL FUMARATE DIHYDRATE 160; 4.5 UG/1; UG/1
2 AEROSOL RESPIRATORY (INHALATION) 2 TIMES DAILY
Status: DISCONTINUED | OUTPATIENT
Start: 2019-10-06 | End: 2019-10-08 | Stop reason: HOSPADM

## 2019-10-06 RX ORDER — ONDANSETRON 2 MG/ML
4 INJECTION INTRAMUSCULAR; INTRAVENOUS EVERY 6 HOURS PRN
Status: DISCONTINUED | OUTPATIENT
Start: 2019-10-06 | End: 2019-10-08 | Stop reason: HOSPADM

## 2019-10-06 RX ORDER — IPRATROPIUM BROMIDE AND ALBUTEROL SULFATE 2.5; .5 MG/3ML; MG/3ML
3 SOLUTION RESPIRATORY (INHALATION)
Status: DISCONTINUED | OUTPATIENT
Start: 2019-10-06 | End: 2019-10-08 | Stop reason: HOSPADM

## 2019-10-06 RX ADMIN — DILTIAZEM HYDROCHLORIDE 20 MG: 5 INJECTION INTRAVENOUS at 07:29

## 2019-10-06 RX ADMIN — CEPHALEXIN 500 MG: 500 CAPSULE ORAL at 14:48

## 2019-10-06 RX ADMIN — DIGOXIN 250 MCG: 250 INJECTION, SOLUTION INTRAMUSCULAR; INTRAVENOUS; PARENTERAL at 07:34

## 2019-10-06 RX ADMIN — FENTANYL CITRATE 50 MCG: 50 INJECTION INTRAMUSCULAR; INTRAVENOUS at 07:36

## 2019-10-06 RX ADMIN — ALUMINUM HYDROXIDE, MAGNESIUM HYDROXIDE, AND SIMETHICONE 30 ML: 200; 200; 20 SUSPENSION ORAL at 07:31

## 2019-10-06 RX ADMIN — IPRATROPIUM BROMIDE AND ALBUTEROL SULFATE 3 ML: 2.5; .5 SOLUTION RESPIRATORY (INHALATION) at 10:15

## 2019-10-06 RX ADMIN — Medication 5 MG/HR: at 10:56

## 2019-10-06 RX ADMIN — DIGOXIN 250 MCG: 250 INJECTION, SOLUTION INTRAMUSCULAR; INTRAVENOUS; PARENTERAL at 08:17

## 2019-10-06 RX ADMIN — DILTIAZEM HYDROCHLORIDE 5 MG/HR: 5 INJECTION INTRAVENOUS at 07:43

## 2019-10-06 RX ADMIN — IPRATROPIUM BROMIDE AND ALBUTEROL SULFATE 3 ML: 2.5; .5 SOLUTION RESPIRATORY (INHALATION) at 19:37

## 2019-10-06 RX ADMIN — KETOROLAC TROMETHAMINE 15 MG: 30 INJECTION, SOLUTION INTRAMUSCULAR; INTRAVENOUS at 20:27

## 2019-10-06 RX ADMIN — DILTIAZEM HYDROCHLORIDE 30 MG: 30 TABLET, FILM COATED ORAL at 14:48

## 2019-10-06 RX ADMIN — ALPRAZOLAM 0.5 MG: 0.5 TABLET ORAL at 10:55

## 2019-10-06 RX ADMIN — BUDESONIDE AND FORMOTEROL FUMARATE DIHYDRATE 2 PUFF: 160; 4.5 AEROSOL RESPIRATORY (INHALATION) at 10:55

## 2019-10-06 RX ADMIN — ONDANSETRON 4 MG: 2 INJECTION INTRAMUSCULAR; INTRAVENOUS at 07:32

## 2019-10-06 RX ADMIN — MONTELUKAST SODIUM 10 MG: 10 TABLET, COATED ORAL at 21:50

## 2019-10-06 RX ADMIN — ALPRAZOLAM 0.5 MG: 0.5 TABLET ORAL at 20:25

## 2019-10-06 RX ADMIN — ENOXAPARIN SODIUM 40 MG: 40 INJECTION SUBCUTANEOUS at 10:55

## 2019-10-06 RX ADMIN — IPRATROPIUM BROMIDE AND ALBUTEROL SULFATE 3 ML: 2.5; .5 SOLUTION RESPIRATORY (INHALATION) at 13:54

## 2019-10-06 RX ADMIN — BUDESONIDE AND FORMOTEROL FUMARATE DIHYDRATE 2 PUFF: 160; 4.5 AEROSOL RESPIRATORY (INHALATION) at 17:19

## 2019-10-06 RX ADMIN — ASPIRIN 81 MG: 81 TABLET, COATED ORAL at 10:55

## 2019-10-06 RX ADMIN — SODIUM CHLORIDE 2000 ML: 0.9 INJECTION, SOLUTION INTRAVENOUS at 07:33

## 2019-10-06 RX ADMIN — POTASSIUM CHLORIDE 20 MEQ: 1500 TABLET, EXTENDED RELEASE ORAL at 14:49

## 2019-10-06 RX ADMIN — ACETAMINOPHEN 650 MG: 325 TABLET ORAL at 17:18

## 2019-10-06 RX ADMIN — DILTIAZEM HYDROCHLORIDE 30 MG: 30 TABLET, FILM COATED ORAL at 17:17

## 2019-10-06 RX ADMIN — CEPHALEXIN 500 MG: 500 CAPSULE ORAL at 20:24

## 2019-10-06 NOTE — ED PROVIDER NOTES
History  Chief Complaint   Patient presents with    Elbow Pain     Trevor walker an 82-year-old female was brought to the emergency department due to fever with left elbow pain with started several days prior to arrival   Patient was also complaining of epigastric pain radiating to the back  Patient denies vomiting but had diarrhea several days ago  Patient was admitted in the hospital recently for colitis  Recent x-ray of the left elbow shows signs of possible bursitis  History provided by:  Patient   used: No    Medical Problem   Location:  Left elbow, epigastric area  Quality:  Pain  Severity:  Severe  Onset quality:  Gradual  Duration: Several   Timing:  Constant  Progression:  Worsening  Chronicity:  Recurrent  Associated symptoms: abdominal pain, fever and myalgias    Associated symptoms: no chest pain, no congestion, no cough, no diarrhea, no ear pain, no fatigue, no headaches, no loss of consciousness, no nausea, no rash, no rhinorrhea, no shortness of breath, no sore throat, no vomiting and no wheezing    Abdominal pain:     Location:  Epigastric    Quality: aching      Severity:  Severe    Onset quality:  Gradual    Duration: Few  Timing:  Constant    Progression:  Worsening    Chronicity:  Recurrent  Fever:     Fever duration: Unable to specify  Timing:  Sporadic    Max temp PTA:  Unable to specify    Temp source:  Unable to specify    Progression:  Unable to specify  Myalgias:     Location:  Generalized    Quality:  Aching    Severity:  Severe    Onset quality:  Gradual    Duration: Few  Timing:  Sporadic    Progression:  Waxing and waning      Prior to Admission Medications   Prescriptions Last Dose Informant Patient Reported? Taking?    ALPRAZolam (XANAX) 0 5 mg tablet   Yes No   Sig: Take 0 5 mg by mouth daily at bedtime as needed   Albuterol Sulfate 108 (90 Base) MCG/ACT AEPB   Yes No   Sig: Inhale 2 puffs 3 (three) times a day as needed   Calcium Carb-Cholecalciferol (CALCIUM 1000 + D PO)   Yes No   Sig: Take 1,000 mg by mouth daily   alendronate (FOSAMAX) 70 mg tablet  Self Yes No   Sig: Take 70 mg by mouth every 7 days   aspirin (ECOTRIN LOW STRENGTH) 81 mg EC tablet   Yes No   Sig: Take 81 mg by mouth Daily   famotidine (PEPCID) 40 MG tablet   Yes No   Sig: Take 40 mg by mouth as needed    fluticasone-umeclidinium-vilanterol (TRELEGY ELLIPTA) 100-62 5-25 MCG/INH inhaler   Yes No   Si puff daily   furosemide (LASIX) 20 mg tablet  Self Yes No   Sig: Take 20 mg by mouth daily   ipratropium-albuterol (COMBIVENT RESPIMAT) inhaler   Yes No   Sig: Every 6 hours   methylPREDNISolone 4 MG tablet therapy pack   No No   Sig: Use as directed on package   Patient not taking: Reported on 10/3/2019   montelukast (SINGULAIR) 10 mg tablet  Self Yes No   Sig: Take 10 mg by mouth daily at bedtime   potassium chloride (MICRO-K) 10 MEQ CR capsule  Self Yes No   Sig: Take 10 mEq by mouth daily      Facility-Administered Medications: None       Past Medical History:   Diagnosis Date    Asthma     Blurred vision     Cardiac disease     COPD (chronic obstructive pulmonary disease) (HCC)     Diverticulosis     Emphysema lung (HCC)        Past Surgical History:   Procedure Laterality Date    COLON SURGERY      COLONOSCOPY W/ POLYPECTOMY N/A 2019    Procedure: COLONOSCOPY W/ POLYPECTOMY;  Surgeon: Beka Ashley MD;  Location: [facility] GI LAB; Service: General       History reviewed  No pertinent family history  I have reviewed and agree with the history as documented  Social History     Tobacco Use    Smoking status: Former Smoker     Last attempt to quit: 2013     Years since quittin 8    Smokeless tobacco: Never Used   Substance Use Topics    Alcohol use: Not Currently     Frequency: Never     Binge frequency: Never    Drug use: No        Review of Systems   Constitutional: Positive for fever  Negative for fatigue     HENT: Negative for congestion, ear pain, rhinorrhea and sore throat  Eyes: Negative  Respiratory: Negative for cough, shortness of breath and wheezing  Cardiovascular: Negative for chest pain  Gastrointestinal: Positive for abdominal pain  Negative for diarrhea, nausea and vomiting  Endocrine: Negative  Genitourinary: Negative  Musculoskeletal: Positive for arthralgias and myalgias  Skin: Negative for rash  Allergic/Immunologic: Negative  Neurological: Negative for loss of consciousness and headaches  Hematological: Negative  Psychiatric/Behavioral: Negative  Physical Exam  Physical Exam   Constitutional: She is oriented to person, place, and time  She appears well-developed and well-nourished  No distress  HENT:   Head: Normocephalic and atraumatic  Right Ear: External ear normal    Left Ear: External ear normal    Nose: Nose normal    Mouth/Throat: Oropharynx is clear and moist  No oropharyngeal exudate  Eyes: Pupils are equal, round, and reactive to light  Conjunctivae and EOM are normal  Right eye exhibits no discharge  Left eye exhibits no discharge  No scleral icterus  Neck: Normal range of motion  Neck supple  No tracheal deviation present  No thyromegaly present  Cardiovascular: Normal rate and regular rhythm  Pulmonary/Chest: Effort normal and breath sounds normal  No stridor  No respiratory distress  Abdominal: Soft  Bowel sounds are normal  There is tenderness in the epigastric area  Musculoskeletal: She exhibits tenderness  She exhibits no edema or deformity  Left elbow: She exhibits decreased range of motion and swelling  Tenderness found  Lymphadenopathy:     She has no cervical adenopathy  Neurological: She is alert and oriented to person, place, and time  No cranial nerve deficit or sensory deficit  She exhibits normal muscle tone  Coordination normal    Skin: Skin is warm and dry  No rash noted  She is not diaphoretic  There is erythema  No pallor     Psychiatric: Her speech is normal  Thought content normal  Her mood appears anxious  She is agitated  Cognition and memory are normal    Nursing note and vitals reviewed        Vital Signs  ED Triage Vitals   Temperature Pulse Respirations Blood Pressure SpO2   10/06/19 0528 10/06/19 0528 10/06/19 0528 10/06/19 0528 10/06/19 0528   98 2 °F (36 8 °C) 72 (!) 26 114/71 98 %      Temp Source Heart Rate Source Patient Position - Orthostatic VS BP Location FiO2 (%)   10/06/19 0800 10/06/19 0745 -- 10/06/19 0715 --   Tympanic Monitor  Left arm       Pain Score       10/06/19 0528       Worst Possible Pain           Vitals:    10/06/19 0528 10/06/19 0715 10/06/19 0745 10/06/19 0800   BP: 114/71 148/69 118/58 125/63   Pulse: 72 (!) 180 (!) 170 (!) 164         Visual Acuity      ED Medications  Medications   sodium chloride 0 9 % bolus 2,000 mL (2,000 mL Intravenous New Bag 10/6/19 0733)   digoxin (LANOXIN) injection 250 mcg (has no administration in time range)   ondansetron (ZOFRAN) injection 4 mg (4 mg Intravenous Given 10/6/19 0732)   fentanyl citrate (PF) 100 MCG/2ML 50 mcg (50 mcg Intravenous Given 10/6/19 0736)   aluminum-magnesium hydroxide-simethicone (MYLANTA) 200-200-20 mg/5 mL oral suspension 30 mL (30 mL Oral Given 10/6/19 0731)   digoxin (LANOXIN) injection 250 mcg (250 mcg Intravenous Given 10/6/19 0734)   diltiazem (CARDIZEM) 125 mg in sodium chloride 0 9 % 125 mL infusion (7 5 mg/hr Intravenous Rate/Dose Change 10/6/19 0802)   diltiazem (CARDIZEM) injection 20 mg (20 mg Intravenous Given 10/6/19 0729)       Diagnostic Studies  Results Reviewed     Procedure Component Value Units Date/Time    Troponin I [[id number]]     Lab Status:  No result Specimen:  Blood     Troponin I [[id number]]  (Normal) Collected:  10/06/19 0711    Lab Status:  Final result Specimen:  Blood from Arm, Right Updated:  10/06/19 0748     Troponin I <0 03 ng/mL     Comprehensive metabolic panel [[id number]]  (Abnormal) Collected:  10/06/19 0711    Lab Status:  Final result Specimen:  Blood from Arm, Right Updated:  10/06/19 0746     Sodium 137 mmol/L      Potassium 3 7 mmol/L      Chloride 96 mmol/L      CO2 30 mmol/L      ANION GAP 11 mmol/L      BUN 17 mg/dL      Creatinine 0 57 mg/dL      Glucose 130 mg/dL      Calcium 9 1 mg/dL      AST 16 U/L      ALT 32 U/L      Alkaline Phosphatase 51 U/L      Total Protein 6 3 g/dL      Albumin 3 5 g/dL      Total Bilirubin 0 40 mg/dL      eGFR 87 ml/min/1 73sq m     Narrative:       [place] guidelines for Chronic Kidney Disease (CKD):     Stage 1 with normal or high GFR (GFR > 90 mL/min/1 73 square meters)    Stage 2 Mild CKD (GFR = 60-89 mL/min/1 73 square meters)    Stage 3A Moderate CKD (GFR = 45-59 mL/min/1 73 square meters)    Stage 3B Moderate CKD (GFR = 30-44 mL/min/1 73 square meters)    Stage 4 Severe CKD (GFR = 15-29 mL/min/1 73 square meters)    Stage 5 End Stage CKD (GFR <15 mL/min/1 73 square meters)  Note: GFR calculation is accurate only with a steady state creatinine    Lactic acid, plasma x2 [[id number]]  (Normal) Collected:  10/06/19 0711    Lab Status:  Final result Specimen:  Blood from Arm, Right Updated:  10/06/19 0746     LACTIC ACID 1 5 mmol/L     Narrative:       Result may be elevated if tourniquet was used during collection      Urine Microscopic [[id number]]  (Abnormal) Collected:  10/06/19 0710    Lab Status:  Final result Specimen:  Urine, Clean Catch Updated:  10/06/19 0738     RBC, UA 1-2 /hpf      WBC, UA 2-4 /hpf      Epithelial Cells Occasional /hpf      Bacteria, UA Occasional /hpf      OTHER OBSERVATIONS Yeast Cells Present    CBC and differential [[id number]]  (Abnormal) Collected:  10/06/19 0711    Lab Status:  Final result Specimen:  Blood from Arm, Right Updated:  10/06/19 0727     WBC 10 00 Thousand/uL      RBC 5 04 Million/uL      Hemoglobin 14 1 g/dL      Hematocrit 44 2 %      MCV 88 fL      MCH 28 0 pg      MCHC 31 9 g/dL      RDW 14 5 %      MPV 7 5 fL      Platelets 271 Thousands/uL      Neutrophils Relative 75 %      Lymphocytes Relative 15 %      Monocytes Relative 9 %      Eosinophils Relative 1 %      Basophils Relative 0 %      Neutrophils Absolute 7 50 Thousands/µL      Lymphocytes Absolute 1 50 Thousands/µL      Monocytes Absolute 0 90 Thousand/µL      Eosinophils Absolute 0 10 Thousand/µL      Basophils Absolute 0 00 Thousands/µL     UA w Reflex to Microscopic w Reflex to Culture [[id number]]  (Abnormal) Collected:  10/06/19 0710    Lab Status:  Final result Specimen:  Urine, Clean Catch Updated:  10/06/19 0725     Color, UA Yellow     Clarity, UA Clear     Specific Gravity, UA 1 010     pH, UA 6 0     Leukocytes, UA 1+     Nitrite, UA Negative     Protein, UA Negative mg/dl      Glucose, UA Negative mg/dl      Ketones, UA Negative mg/dl      Urobilinogen, UA 0 2 E U /dl      Bilirubin, UA Negative     Blood, UA Trace-Intact    Blood culture #2 [[id number]] Collected:  10/06/19 0711    Lab Status: In process Specimen:  Blood from Arm, Right Updated:  10/06/19 0720    Blood culture #1 [[id number]] Collected:  10/06/19 0711    Lab Status:   In process Specimen:  Blood from Arm, Right Updated:  10/06/19 0720    Lactic acid, plasma x2 [[id number]]     Lab Status:  No result Specimen:  Blood                  XR chest 1 view portable   ED Interpretation by Mercedez Roberts MD (10/06 1446)   No acute disease                 Procedures  ECG 12 Lead Documentation Only  Date/Time: 10/6/2019 7:20 AM  Performed by: Mercedez Roberts MD  Authorized by: Mercedez Roberts MD     Indications / Diagnosis:  Fever and generalized pain  ECG reviewed by me, the ED Provider: yes    Patient location:  ED  Previous ECG:     Previous ECG:  Compared to current    Comparison ECG info:  October 2, 2019    Similarity:  Changes noted (Atrial fibrillation is now present compared to the old EKG)    Comparison to cardiac monitor: Yes    Interpretation:     Interpretation: abnormal Quality:     Tracing quality:  Limited by artifact  Rate:     ECG rate:  163 beats per minute    ECG rate assessment: tachycardic    Rhythm:     Rhythm: atrial fibrillation    Ectopy:     Ectopy: none    QRS:     QRS axis:  Normal    QRS intervals:  Normal  Conduction:     Conduction: normal    ST segments:     ST segments:  Non-specific  T waves:     T waves: non-specific      CriticalCare Time  Performed by: Eleonora Haynes MD  Authorized by: Eleonora Haynes MD     Critical care provider statement:     Critical care time (minutes):  100    Critical care start time:  10/6/2019 6:50 AM    Critical care end time:  10/6/2019 8:16 AM    Critical care time was exclusive of:  Separately billable procedures and treating other patients    Critical care was necessary to treat or prevent imminent or life-threatening deterioration of the following conditions: Cardiac compromise  Critical care was time spent personally by me on the following activities:  Blood draw for specimens, obtaining history from patient or surrogate, development of treatment plan with patient or surrogate, evaluation of patient's response to treatment, examination of patient, ordering and performing treatments and interventions, ordering and review of laboratory studies, ordering and review of radiographic studies, re-evaluation of patient's condition and review of old charts    I assumed direction of critical care for this patient from another provider in my specialty: no             ED Course  ED Course as of Oct 06 0817   Sánchez Best Oct 06, 2019   0809 Patient is resting comfortably on the stretcher although the heart rate is still more than 100 per minute  I discussed with her the results of her blood work, EKG and chest x-ray  I informed her that she will be admitted in the [address] for admission was discussed with Dr Teddy Morales, Hospitalist on call, and he agreed                                    MDM  Number of Diagnoses or Management Options  Atrial fibrillation with rapid ventricular response [facility]): new and requires workup  Left elbow pain: established and worsening     Amount and/or Complexity of Data Reviewed  Clinical lab tests: ordered and reviewed  Tests in the radiology section of CPT®: ordered and reviewed  Tests in the medicine section of CPT®: ordered and reviewed  Decide to obtain previous medical records or to obtain history from someone other than the patient: yes  Obtain history from someone other than the patient: yes  Review and summarize past medical records: yes  Discuss the patient with other providers: yes  Independent visualization of images, tracings, or specimens: yes    Risk of Complications, Morbidity, and/or Mortality  Presenting problems: high  Diagnostic procedures: high  Management options: high    Patient Progress  Patient progress: stable      Disposition  Final diagnoses:   Atrial fibrillation with rapid ventricular response ([address] )   Left elbow pain     Time reflects when diagnosis was documented in both MDM as applicable and the Disposition within this note     Time User Action Codes Description Comment    10/6/2019  8:15 AM Clenton Pour Add [I48 91] Atrial fibrillation with rapid ventricular response ([address] )     10/6/2019  8:15 AM Clenton Pour Add [M25 522] Left elbow pain       ED Disposition     ED Disposition Condition Date/Time Comment    Admit Stable Sun Oct 6, 2019  8:14 AM Case was discussed with Dr Jose Covington, Hospitalist on call and the patient's admission status was agreed to be Admission Status: inpatient status to the service of Dr Jose Covington   Follow-up Information    None         Patient's Medications   Discharge Prescriptions    No medications on file     No discharge procedures on file      ED Provider  Electronically Signed by           Melvi Banuelos MD  10/06/19 0104

## 2019-10-06 NOTE — H&P
H&P- Luis Cm 1935, 82 y o  female MRN: [id number]    Unit/Bed#: ICU 05 Encounter: [id number]    Primary Care Provider: Glena Goldmann, MD   Date and time admitted to hospital: 10/6/2019  5:27 AM        * Atrial fibrillation with rapid ventricular response ([facility] )  Assessment & Plan  · Will monitor in the ICU  · Continue Cardizem drip and titrate as tolerated  · Will check echo  · Troponin negative  · Cardiology consult    Left elbow pain  Assessment & Plan  · Patient was scheduled to follow up with Orthopedic as outpatient for drainage  · Will consult Orthopedic    Lumbar radiculopathy  Assessment & Plan  · Pain control as needed  · Follow up with Orthopedic surgery    COPD (chronic obstructive pulmonary disease) ([facility] )  Assessment & Plan  · Stable, no signs of acute exacerbation  · Continue oxygen and nebulizers as needed      VTE Prophylaxis: Enoxaparin (Lovenox)  Code Status:  Full code  POLST: There is no POLST form on file for this patient (pre-hospital)  Discussion with family:  No family available at bedside during my evaluation    Anticipated Length of Stay:  Patient will be admitted on an Inpatient basis with an anticipated length of stay of  > 2 midnights  Justification for Hospital Stay:  Rapid AFib    Chief Complaint:   Left elbow pain    History of Present Illness:    Luis Cm is a 82 y o  female who presents with left elbow pain  Patient states that she was recently discharged and woke up this morning with worsening pain of her left elbow as well as low back  Patient came to the ER for further evaluation  In the ER patient was noted to be in rapid AFib and given IV digoxin and started on Cardizem drip  Patient denies any history of previous AFib  Patient denies any chest pain or palpitations  Patient states that he does have some lightheadedness/dizziness however it has improved as heart rate as improved  Patient denies any nausea or vomiting    Diarrhea from previous admission has improved  No blood in stool  Review of Systems:  Review of Systems   Constitutional: Positive for activity change, appetite change and fatigue  Negative for chills and fever  Respiratory: Positive for shortness of breath  Cardiovascular: Negative for chest pain  Gastrointestinal: Negative for abdominal pain  Musculoskeletal: Positive for arthralgias, back pain and joint swelling  All other systems reviewed and are negative  Past Medical and Surgical History:   Past Medical History:   Diagnosis Date    Asthma     Blurred vision     Cardiac disease     COPD (chronic obstructive pulmonary disease) ([facility] )     Diverticulosis     Emphysema lung ([facility] )        Past Surgical History:   Procedure Laterality Date    COLON SURGERY      COLONOSCOPY W/ POLYPECTOMY N/A 1/21/2019    Procedure: COLONOSCOPY W/ POLYPECTOMY;  Surgeon: Alfredo Chavez MD;  Location: [facility] GI LAB; Service: General       Meds/Allergies:  Prior to Admission medications    Medication Sig Start Date End Date Taking?  Authorizing Provider   Albuterol Sulfate 108 (90 Base) MCG/ACT AEPB Inhale 2 puffs 3 (three) times a day as needed    Historical Provider, MD   alendronate (FOSAMAX) 70 mg tablet Take 70 mg by mouth every 7 days    Historical Provider, MD   ALPRAZolam Emelina Espinoza) 0 5 mg tablet Take 0 5 mg by mouth daily at bedtime as needed    Historical Provider, MD   aspirin (ECOTRIN LOW STRENGTH) 81 mg EC tablet Take 81 mg by mouth Daily 7/10/18 10/3/19  Historical Provider, MD   Calcium Carb-Cholecalciferol (CALCIUM 1000 + D PO) Take 1,000 mg by mouth daily 3/28/18   Historical Provider, MD   famotidine (PEPCID) 40 MG tablet Take 40 mg by mouth as needed     Historical Provider, MD   fluticasone-umeclidinium-vilanterol (TRELEGY ELLIPTA) 100-62 5-25 MCG/INH inhaler 1 puff daily 9/4/19   Historical Provider, MD   furosemide (LASIX) 20 mg tablet Take 20 mg by mouth daily    Historical Provider, MD ipratropium-albuterol (COMBIVENT RESPIMAT) inhaler Every 6 hours    Historical Provider, MD   methylPREDNISolone 4 MG tablet therapy pack Use as directed on package  Patient not taking: Reported on 10/3/2019 9/29/19   Ernestina Guan MD   montelukast (SINGULAIR) 10 mg tablet Take 10 mg by mouth daily at bedtime    Historical Provider, MD   potassium chloride (MICRO-K) 10 MEQ CR capsule Take 10 mEq by mouth daily    Historical Provider, MD     I have reviewed home medications with patient personally  Allergies: Allergies   Allergen Reactions    Fluticasone        Social History:  Marital Status:    Occupation:  None  Patient Pre-hospital Living Situation:  Lives alone  Patient Pre-hospital Level of Mobility:  Ambulates with assistive devices  Patient Pre-hospital Diet Restrictions:  None  Substance Use History:     Social History     Substance and Sexual Activity   Alcohol Use Not Currently    Frequency: Never    Binge frequency: Never     Social History     Tobacco Use   Smoking Status Former Smoker    Last attempt to quit: 2013    Years since quittin 8   Smokeless Tobacco Never Used     Social History     Substance and Sexual Activity   Drug Use No       Family History:  Patient denies any history of malignancy in her immediate family    Physical Exam:   Vitals:   Blood Pressure: 117/74 (10/06/19 0845)  Pulse: (!) 160 (10/06/19 0845)  Temperature: (!) 97 1 °F (36 2 °C) (10/06/19 0845)  Temp Source: Tympanic (10/06/19 0800)  Respirations: (!) 25 (10/06/19 0845)  Height: 5' (152 4 cm) (10/06/19 0845)  Weight - Scale: 78 1 kg (172 lb 2 9 oz) (10/06/19 0845)  SpO2: 96 % (10/06/19 0845)    Physical Exam   Constitutional: No distress  Frail elderly female   HENT:   Head: Normocephalic and atraumatic  Eyes: Conjunctivae and EOM are normal    Neck: Normal range of motion  Neck supple  Cardiovascular:   Irregular rhythm   Pulmonary/Chest: Effort normal  No respiratory distress     Abdominal: Soft  She exhibits no distension  There is no tenderness  Musculoskeletal: Normal range of motion  Swelling of left elbow/bursa noted  No tenderness to palpation  Range of motion intact  Neurological: She is alert  No cranial nerve deficit  Skin: Skin is warm and dry  Psychiatric: She has a normal mood and affect  Her behavior is normal        Additional Data:   Lab Results: I have personally reviewed pertinent reports  Results from last 7 days   Lab Units 10/06/19  0711   WBC Thousand/uL 10 00   HEMOGLOBIN g/dL 14 1   HEMATOCRIT % 44 2   PLATELETS Thousands/uL 296   NEUTROS PCT % 75   LYMPHS PCT % 15*   MONOS PCT % 9   EOS PCT % 1     Results from last 7 days   Lab Units 10/06/19  0711   POTASSIUM mmol/L 3 7   CHLORIDE mmol/L 96*   CO2 mmol/L 30   BUN mg/dL 17   CREATININE mg/dL 0 57*   CALCIUM mg/dL 9 1   ALK PHOS U/L 51*   ALT U/L 32   AST U/L 16                   Imaging: I have personally reviewed pertinent reports  XR chest 1 view portable   ED Interpretation by Derian Lehman MD (10/06 1994)   No acute disease          [facility]/[facility] Records Reviewed: Yes     ** Please Note: This note has been constructed using a voice recognition system   **

## 2019-10-06 NOTE — PHYSICAL THERAPY NOTE
Physical Therapy Cancellation Note    PT order received  Chart review performed  At this time, PT evaluation cancelled as patient is currently not medically appropriate for therapy interventions  PT will follow and evaluate as appropriate      Ethan Chapin PT

## 2019-10-06 NOTE — CONSULTS
Orthopedics General Consult        Chief Complaint:   Chief Complaint   Patient presents with    Elbow Pain     Left Elbow and low back pain    HPI: Patient has been suffering from left olecranon bursitis and she was to  Be booked for excision of the bursae  However patient is readmitted for Atrial fibrillation  She is difficult historian and is not easy to understand  She is complaining of low back pian with swelling and pain in the left elbow  The back pain has been persistent and is due to spondylosis and spinal stenosis in the lumbar spine  She was given a recent dose of Steroids at her admission in the hospital by hospitalist   This improved her back pain and now it returned  On account of her medical condition she is not a surgical candidate     82 y  o female    Elbow Pain  Patient complains of left elbow pain  Onset of the symptoms was about a month ago  Inciting event: none known  Current symptoms include: point tenderness over the left olecranon  Pain is aggravated by: grasping  Symptoms have gradually worsened  Patient has had prior elbow problems  Evaluation to date: plain films, which were normal  Treatment to date: avoidance of offending activity  Physical Exam: Patient is lying in bed and seems to be confused she is short of breath  Not able to get out of bed  The left elbow is swollen over the olecronon, it is warm to touch and tender, The left elbow movements are intact and pain free  Flexion and Extension along with supination and pronation  Ortho Exam  Musculoskeletal: left upper extremity  · SkinNAD  ·   · Full active & passive ROM at L  Elbow intact   · SILT m/r/u    · Motor intact ain/pin/m/r/u, Normal  · 2+ rad pulse Present    ·       Tertiary: no tenderness over all other joints/long bones as except already stated  Radiology:   I personally reviewed the films  [unfilled]    Assessment:  82 y  o female with left elbow swelling and tenderness of nearly one month duration  Plan: Patient would benefit from excision of the olecranon bursae once she is stable  For her back pain she may benefit with L  Epidural otherwise she needs surgical intervention    ·   · PT/OT  · Pain control per primary team  · DVT ppx per primary team  · Andrew Valerio MD

## 2019-10-06 NOTE — PLAN OF CARE
Problem: Potential for Falls  Goal: Patient will remain free of falls  Description  INTERVENTIONS:  - Assess patient frequently for physical needs  -  Identify cognitive and physical deficits and behaviors that affect risk of falls    -  [place] fall precautions as indicated by assessment   - Educate patient/family on patient safety including physical limitations  - Instruct patient to call for assistance with activity based on assessment  - Modify environment to reduce risk of injury  - Consider OT/PT consult to assist with strengthening/mobility  Outcome: Progressing     Problem: PAIN - ADULT  Goal: Verbalizes/displays adequate comfort level or baseline comfort level  Description  Interventions:  - Encourage patient to monitor pain and request assistance  - Assess pain using appropriate pain scale  - Administer analgesics based on type and severity of pain and evaluate response  - Implement non-pharmacological measures as appropriate and evaluate response  - Consider cultural and social influences on pain and pain management  - Notify physician/advanced practitioner if interventions unsuccessful or patient reports new pain  Outcome: Progressing     Problem: INFECTION - ADULT  Goal: Absence or prevention of progression during hospitalization  Description  INTERVENTIONS:  - Assess and monitor for signs and symptoms of infection  - Monitor lab/diagnostic results  - Monitor all insertion sites, i e  indwelling lines, tubes, and drains  - Monitor endotracheal if appropriate and nasal secretions for changes in amount and color  - [place] appropriate cooling/warming therapies per order  - Administer medications as ordered  - Instruct and encourage patient and family to use good hand hygiene technique  - Identify and instruct in appropriate isolation precautions for identified infection/condition  Outcome: Progressing  Goal: Absence of fever/infection during neutropenic period  Description  INTERVENTIONS:  - Monitor WBC    Outcome: Progressing     Problem: SAFETY ADULT  Goal: Maintain or return to baseline ADL function  Description  INTERVENTIONS:  -  Assess patient's ability to carry out ADLs; assess patient's baseline for ADL function and identify physical deficits which impact ability to perform ADLs (bathing, care of mouth/teeth, toileting, grooming, dressing, etc )  - Assess/evaluate cause of self-care deficits   - Assess range of motion  - Assess patient's mobility; develop plan if impaired  - Assess patient's need for assistive devices and provide as appropriate  - Encourage maximum independence but intervene and supervise when necessary  - Involve family in performance of ADLs  - Assess for home care needs following discharge   - Consider OT consult to assist with ADL evaluation and planning for discharge  - Provide patient education as appropriate  Outcome: Progressing  Goal: Maintain or return mobility status to optimal level  Description  INTERVENTIONS:  - Assess patient's baseline mobility status (ambulation, transfers, stairs, etc )    - Identify cognitive and physical deficits and behaviors that affect mobility  - Identify mobility aids required to assist with transfers and/or ambulation (gait belt, sit-to-stand, lift, walker, cane, etc )  - [place] fall precautions as indicated by assessment  - Record patient progress and toleration of activity level on Mobility SBAR; progress patient to next Phase/Stage  - Instruct patient to call for assistance with activity based on assessment  - Consider rehabilitation consult to assist with strengthening/weightbearing, etc   Outcome: Progressing     Problem: DISCHARGE PLANNING  Goal: Discharge to home or other facility with appropriate resources  Description  INTERVENTIONS:  - Identify barriers to discharge w/patient and caregiver  - Arrange for needed discharge resources and transportation as appropriate  - Identify discharge learning needs (meds, wound care, etc )  - Arrange for interpretive services to assist at discharge as needed  - Refer to Case Management Department for coordinating discharge planning if the patient needs post-hospital services based on physician/advanced practitioner order or complex needs related to functional status, cognitive ability, or social support system  Outcome: Progressing     Problem: Knowledge Deficit  Goal: Patient/family/caregiver demonstrates understanding of disease process, treatment plan, medications, and discharge instructions  Description  Complete learning assessment and assess knowledge base  Interventions:  - Provide teaching at level of understanding  - Provide teaching via preferred learning methods  Outcome: Progressing     Problem: Nutrition/Hydration-ADULT  Goal: Nutrient/Hydration intake appropriate for improving, restoring or maintaining nutritional needs  Description  Monitor and assess patient's nutrition/hydration status for malnutrition  Collaborate with interdisciplinary team and initiate plan and interventions as ordered  Monitor patient's weight and dietary intake as ordered or per policy  Utilize nutrition screening tool and intervene as necessary  Determine patient's food preferences and provide high-protein, high-caloric foods as appropriate       INTERVENTIONS:  - Monitor oral intake, urinary output, labs, and treatment plans  - Assess nutrition and hydration status and recommend course of action  - Evaluate amount of meals eaten  - Assist patient with eating if necessary   - Allow adequate time for meals  - Recommend/ encourage appropriate diets, oral nutritional supplements, and vitamin/mineral supplements  - Order, calculate, and assess calorie counts as needed  - Recommend, monitor, and adjust tube feedings and TPN/PPN based on assessed needs  - Assess need for intravenous fluids  - Provide specific nutrition/hydration education as appropriate  - Include patient/family/caregiver in decisions related to nutrition  Outcome: Progressing

## 2019-10-06 NOTE — RESPIRATORY THERAPY NOTE
RT Protocol Note  Robert Mortensen 82 y o  female MRN: [id number]  Unit/Bed#: ICU 05 Encounter: [id number]    Assessment    Principal Problem:    Atrial fibrillation with rapid ventricular response (HCC)  Active Problems:    COPD (chronic obstructive pulmonary disease) ([facility])    Lumbar radiculopathy    Left elbow pain      Home Pulmonary Medications:  Home Devices/Therapy: Other (Comment), Home O2(Duo neb, Alnuterol MDI and trelegy ) Patient has an Albuterol MDI   Past Medical History:   Diagnosis Date    Asthma     Blurred vision     Cardiac disease     [facility]     Diverticulosis     Emphysema lung ([facility] )      Social History     Socioeconomic History    Marital status:       Spouse name: None    Number of children: None    Years of education: None    Highest education level: None   Occupational History    None   Social Needs    Financial resource strain: None    Food insecurity:     Worry: None     Inability: None    Transportation needs:     Medical: None     Non-medical: None   Tobacco Use    Smoking status: Former Smoker     Last attempt to quit: 2013     Years since quittin 8    Smokeless tobacco: Never Used   Substance and Sexual Activity    Alcohol use: Not Currently     Frequency: Never     Binge frequency: Never    Drug use: No    Sexual activity: Not Currently   Lifestyle    Physical activity:     Days per week: None     Minutes per session: None    Stress: None   Relationships    Social connections:     Talks on phone: None     Gets together: None     Attends Buddhism service: None     Active member of club or organization: None     Attends meetings of clubs or organizations: None     Relationship status: None    Intimate partner violence:     Fear of current or ex partner: None     Emotionally abused: None     Physically abused: None     Forced sexual activity: None   Other Topics Concern    None   Social History Narrative    None       Subjective         Objective    Physical Exam:   Assessment Type: Pre-treatment  General Appearance: Alert, Awake  Respiratory Pattern: Normal  Chest Assessment: Chest expansion symmetrical  Bilateral Breath Sounds: Other (Comment)(decreased slightly coarse)  Cough: None    Vitals:  Blood pressure 117/74, pulse (!) 160, temperature (!) 97 1 °F (36 2 °C), resp  rate (!) 25, height 5' (1 524 m), weight 78 1 kg (172 lb 2 9 oz), SpO2 100 %  Imaging and other studies: I have personally reviewed pertinent reports              Plan    Respiratory Plan: Home Bronchodilator Patient pathway        Resp Comments: Post treatment evaluation

## 2019-10-06 NOTE — ASSESSMENT & PLAN NOTE
· Patient was scheduled to follow up with Orthopedic as outpatient for drainage  · Will consult Orthopedic

## 2019-10-06 NOTE — CONSULTS
Consultation - Cardiology   Daniel Vasques 82 y o  female MRN: [id number]  Unit/Bed#: ICU 05 Encounter: [id number]    Assessment/Plan     Assessment:  Paroxysmally atrial fibrillation  COPD  Left olecranon bursitis  Tobacco abuse    Plan:  -Agree with Cardizem infusion in the setting of active wheezing  She spontaneously converted to normal sinus rhythm  Tele shows normal sinus rhythm with PACs  Will transition to oral Cardizem 30 mg q 6 hours and infusion may be stopped once the 1st dose is given  Will give her 1 time dose of 20 mEq KCL  Will get Mag levels and replace as needed  Am to keep K above 4, Mag above 2   -unknown duration of atrial fibrillation as she was asymptomatic from it on presentation  Michael Leary is at least 1 (age, gender) and she will benefit from anticoagulation for stroke prevention  She may be started on Eliquis 5 mg p o  B i d  For stroke prevention if no surgical intervention is planned for her bursitis   -transthoracic echocardiogram in the morning  -TSH done on 2nd October was normal  -rest based on clinical course     History of Present Illness   Physician Requesting Consult: Lyla Celaya MD  Reason for Consult / Principal Problem:  Atrial fibrillation with RVR  HPI: Daniel Vasques is a 82y o  year old female with a history of COPD who presented to the hospital due to left elbow pain  She has been managed as an outpatient for olecranon bursitis, was deemed to not be a surgical candidate  She is on 5 L oxygen at home  Unfortunately she continues to smoke  Upon presentation to the ER she was found to be in atrial fibrillation with rapid ventricular response which is new for this patient  Patient is a somewhat poor historian  There is no history of chest pain are worsening dyspnea on exertion dyspnea on exertion than she has at her baseline  She has no prior cardiac history  She spontaneously converted to normal sinus rhythm overnight    Currently she only complains of back pain and left elbow pain otherwise hemodynamically stable  Inpatient consult to Cardiology  Consult performed by: Kolton Amaya MD  Consult ordered by: Vin Durand MD          Review of Systems   All other systems reviewed and are negative  Historical Information   Past Medical History:   Diagnosis Date    Asthma     Blurred vision     Cardiac disease     [facility]ronic obstructive [facility]) [facility]     Diverticulosis     [facility]      Past Surgical History:   Procedure Laterality Date    COLON SURGERY      COLONOSCOPY W/ POLYPECTOMY N/A 2019    Procedure: COLONOSCOPY W/ POLYPECTOMY;  Surgeon: Keller Dandy, MD;  Location: [address] [place] [address] GI LAB;   Service: General     Social History     Substance and Sexual Activity   Alcohol Use Not Currently    Frequency: Never    Binge frequency: Never     Social History     Substance and Sexual Activity   Drug Use No     Social History     Tobacco Use   Smoking Status Former Smoker    Last attempt to quit: 2013    Years since quittin 8   Smokeless Tobacco Never Used     Family History: non-contributory    Meds/Allergies   all current active meds have been reviewed and current meds:   Current Facility-Administered Medications   Medication Dose Route Frequency    acetaminophen (TYLENOL) tablet 650 mg  650 mg Oral Q6H PRN    ALPRAZolam (XANAX) tablet 0 5 mg  0 5 mg Oral HS PRN    aspirin (ECOTRIN LOW STRENGTH) EC tablet 81 mg  81 mg Oral Daily    budesonide-formoterol (SYMBICORT) 160-4 5 mcg/act inhaler 2 puff  2 puff Inhalation BID    cephalexin (KEFLEX) capsule 500 mg  500 mg Oral Q12H [address]    diltiazem (CARDIZEM) tablet 30 mg  30 mg Oral Q6H [address]    enoxaparin (LOVENOX) subcutaneous injection 40 mg  40 mg Subcutaneous Daily    [START ON 10/7/2019] furosemide (LASIX) tablet 20 mg  20 mg Oral Daily    ipratropium-albuterol (DUO-NEB) 0 5-2 5 mg/3 mL inhalation solution 3 mL  3 mL Nebulization Q6H    montelukast (SINGULAIR) tablet 10 mg  10 mg Oral HS    ondansetron (ZOFRAN) injection 4 mg  4 mg Intravenous Q6H PRN    potassium chloride (K-DUR,KLOR-CON) CR tablet 20 mEq  20 mEq Oral Once     Allergies   Allergen Reactions    Fluticasone        Objective   Vitals: Blood pressure 117/74, pulse (!) 160, temperature (!) 97 1 °F (36 2 °C), resp  rate (!) 25, height 5' (1 524 m), weight 78 1 kg (172 lb 2 9 oz), SpO2 93 %  Orthostatic Blood Pressures      Most Recent Value   Blood Pressure  117/74 filed at 10/06/2019 0845   Patient Position - Orthostatic VS  Lying filed at 10/06/2019 0845            Intake/Output Summary (Last 24 hours) at 10/6/2019 1408  Last data filed at 10/6/2019 0900  Gross per 24 hour   Intake 2240 ml   Output 628 ml   Net 1612 ml       Invasive Devices     Peripheral Intravenous Line            Peripheral IV 10/06/19 Right Hand less than 1 day    Peripheral IV 10/06/19 Right Wrist less than 1 day                Physical Exam   Constitutional: She is oriented to person, place, and time  She appears well-developed and well-nourished  HENT:   Head: Normocephalic and atraumatic  Eyes: Pupils are equal, round, and reactive to light  EOM are normal    Neck: Normal range of motion  Cardiovascular: Normal rate  An irregular rhythm present  Murmur heard  Systolic murmur is present with a grade of 2/6  Pulmonary/Chest: Effort normal  She has wheezes  Abdominal: Soft  Musculoskeletal: Normal range of motion  She exhibits no edema  Neurological: She is alert and oriented to person, place, and time  Skin: Skin is warm and dry  Psychiatric: She has a normal mood and affect  Lab Results:   I have personally reviewed pertinent lab results      CBC with diff:   Results from last 7 days   Lab Units 10/06/19  0711   WBC Thousand/uL 10 00   RBC Million/uL 5 04   HEMOGLOBIN g/dL 14 1   HEMATOCRIT % 44 2   MCV fL 88   MCH pg 28 0   MCHC g/dL 31 9   RDW % 14 5   MPV fL 7 5*   PLATELETS Thousands/uL 296     CMP:   Results from last 7 days   Lab Units 10/06/19  0711   SODIUM mmol/L 137   POTASSIUM mmol/L 3 7   CHLORIDE mmol/L 96*   CO2 mmol/L 30   BUN mg/dL 17   CREATININE mg/dL 0 57*   CALCIUM mg/dL 9 1   AST U/L 16   ALT U/L 32   ALK PHOS U/L 51*   EGFR ml/min/1 73sq m 87     Troponin:   0   Lab Value Date/Time    TROPONINI 0 03 10/06/2019 1223    TROPONINI <0 03 10/06/2019 0711    TROPONINI <0 03 10/02/2019 2152    TROPONINI <0 03 12/28/2018 1300     BNP:   Results from last 7 days   Lab Units 10/06/19  0711   POTASSIUM mmol/L 3 7   CHLORIDE mmol/L 96*   CO2 mmol/L 30   BUN mg/dL 17   CREATININE mg/dL 0 57*   CALCIUM mg/dL 9 1   EGFR ml/min/1 73sq m 87     Coags:     TSH:   Results from last 7 days   Lab Units 10/02/19  2152   TSH 3RD GENERATON uIU/mL 2 620     Magnesium:     Imaging: I have personally reviewed pertinent reports  EKG:  Atrial fibrillation with RVR  VTE Prophylaxis: Enoxaparin (Lovenox)    Code Status: Level 1 - Full Code  Advance Directive and Living Will:      Power of :    POLST:      Counseling / Coordination of Care  Total floor / unit time spent today 45 minutes  Greater than 50% of total time was spent with the patient and / or family counseling and / or coordination of care    A description of the counseling / coordination of care:  Discussion with hospitalist

## 2019-10-07 ENCOUNTER — APPOINTMENT (INPATIENT)
Dept: NON INVASIVE DIAGNOSTICS | Facility: HOSPITAL | Age: 84
DRG: 309 | End: 2019-10-07
Payer: MEDICARE

## 2019-10-07 PROBLEM — J96.11 CHRONIC RESPIRATORY FAILURE WITH HYPOXIA (HCC): Status: ACTIVE | Noted: 2019-10-07

## 2019-10-07 LAB
ANION GAP SERPL CALCULATED.3IONS-SCNC: 3 MMOL/L (ref 4–13)
ATRIAL RATE: 535 BPM
BASOPHILS # BLD AUTO: 0 THOUSANDS/ΜL (ref 0–0.1)
BASOPHILS NFR BLD AUTO: 0 % (ref 0–2)
BUN SERPL-MCNC: 7 MG/DL (ref 7–25)
CALCIUM SERPL-MCNC: 8.3 MG/DL (ref 8.6–10.5)
CHLORIDE SERPL-SCNC: 103 MMOL/L (ref 98–107)
CO2 SERPL-SCNC: 31 MMOL/L (ref 21–31)
CREAT SERPL-MCNC: 0.46 MG/DL (ref 0.6–1.2)
EOSINOPHIL # BLD AUTO: 0.1 THOUSAND/ΜL (ref 0–0.61)
EOSINOPHIL NFR BLD AUTO: 1 % (ref 0–5)
ERYTHROCYTE [DISTWIDTH] IN BLOOD BY AUTOMATED COUNT: 14.6 % (ref 11.5–14.5)
GFR SERPL CREATININE-BSD FRML MDRD: 93 ML/MIN/1.73SQ M
GLUCOSE SERPL-MCNC: 126 MG/DL (ref 65–99)
HCT VFR BLD AUTO: 39.1 % (ref 42–47)
HGB BLD-MCNC: 12.5 G/DL (ref 12–16)
LYMPHOCYTES # BLD AUTO: 0.7 THOUSANDS/ΜL (ref 0.6–4.47)
LYMPHOCYTES NFR BLD AUTO: 8 % (ref 21–51)
MCH RBC QN AUTO: 28.1 PG (ref 26–34)
MCHC RBC AUTO-ENTMCNC: 32 G/DL (ref 31–37)
MCV RBC AUTO: 88 FL (ref 81–99)
MONOCYTES # BLD AUTO: 0.6 THOUSAND/ΜL (ref 0.17–1.22)
MONOCYTES NFR BLD AUTO: 6 % (ref 2–12)
NEUTROPHILS # BLD AUTO: 7.6 THOUSANDS/ΜL (ref 1.4–6.5)
NEUTS SEG NFR BLD AUTO: 84 % (ref 42–75)
PLATELET # BLD AUTO: 210 THOUSANDS/UL (ref 149–390)
PMV BLD AUTO: 7.4 FL (ref 8.6–11.7)
POTASSIUM SERPL-SCNC: 4.1 MMOL/L (ref 3.5–5.5)
PROCALCITONIN SERPL-MCNC: 0.06 NG/ML
QRS AXIS: 8 DEGREES
QRSD INTERVAL: 82 MS
QT INTERVAL: 268 MS
QTC INTERVAL: 447 MS
RBC # BLD AUTO: 4.45 MILLION/UL (ref 3.9–5.2)
SODIUM SERPL-SCNC: 137 MMOL/L (ref 134–143)
T WAVE AXIS: 80 DEGREES
VENTRICULAR RATE: 167 BPM
WBC # BLD AUTO: 9 THOUSAND/UL (ref 4.8–10.8)

## 2019-10-07 PROCEDURE — G8978 MOBILITY CURRENT STATUS: HCPCS

## 2019-10-07 PROCEDURE — 99232 SBSQ HOSP IP/OBS MODERATE 35: CPT | Performed by: PHYSICIAN ASSISTANT

## 2019-10-07 PROCEDURE — 93306 TTE W/DOPPLER COMPLETE: CPT | Performed by: INTERNAL MEDICINE

## 2019-10-07 PROCEDURE — 99232 SBSQ HOSP IP/OBS MODERATE 35: CPT | Performed by: INTERNAL MEDICINE

## 2019-10-07 PROCEDURE — 94760 N-INVAS EAR/PLS OXIMETRY 1: CPT

## 2019-10-07 PROCEDURE — 97163 PT EVAL HIGH COMPLEX 45 MIN: CPT

## 2019-10-07 PROCEDURE — 94640 AIRWAY INHALATION TREATMENT: CPT

## 2019-10-07 PROCEDURE — 97116 GAIT TRAINING THERAPY: CPT

## 2019-10-07 PROCEDURE — 80048 BASIC METABOLIC PNL TOTAL CA: CPT | Performed by: INTERNAL MEDICINE

## 2019-10-07 PROCEDURE — 93306 TTE W/DOPPLER COMPLETE: CPT

## 2019-10-07 PROCEDURE — G8988 SELF CARE GOAL STATUS: HCPCS

## 2019-10-07 PROCEDURE — 93010 ELECTROCARDIOGRAM REPORT: CPT | Performed by: INTERNAL MEDICINE

## 2019-10-07 PROCEDURE — 90662 IIV NO PRSV INCREASED AG IM: CPT | Performed by: INTERNAL MEDICINE

## 2019-10-07 PROCEDURE — 97167 OT EVAL HIGH COMPLEX 60 MIN: CPT

## 2019-10-07 PROCEDURE — 84145 PROCALCITONIN (PCT): CPT | Performed by: INTERNAL MEDICINE

## 2019-10-07 PROCEDURE — G8987 SELF CARE CURRENT STATUS: HCPCS

## 2019-10-07 PROCEDURE — G0008 ADMIN INFLUENZA VIRUS VAC: HCPCS | Performed by: INTERNAL MEDICINE

## 2019-10-07 PROCEDURE — 85025 COMPLETE CBC W/AUTO DIFF WBC: CPT | Performed by: INTERNAL MEDICINE

## 2019-10-07 PROCEDURE — G8979 MOBILITY GOAL STATUS: HCPCS

## 2019-10-07 RX ORDER — MAGNESIUM HYDROXIDE/ALUMINUM HYDROXICE/SIMETHICONE 120; 1200; 1200 MG/30ML; MG/30ML; MG/30ML
30 SUSPENSION ORAL EVERY 4 HOURS PRN
Status: DISCONTINUED | OUTPATIENT
Start: 2019-10-07 | End: 2019-10-08 | Stop reason: HOSPADM

## 2019-10-07 RX ADMIN — INFLUENZA A VIRUS A/MICHIGAN/45/2015 X-275 (H1N1) ANTIGEN (FORMALDEHYDE INACTIVATED), INFLUENZA A VIRUS A/SINGAPORE/INFIMH-16-0019/2016 IVR-186 (H3N2) ANTIGEN (FORMALDEHYDE INACTIVATED), AND INFLUENZA B VIRUS B/MARYLAND/15/2016 BX-69A (A B/COLORADO/6/2017-LIKE VIRUS) ANTIGEN (FORMALDEHYDE INACTIVATED) 0.5 ML: 60; 60; 60 INJECTION, SUSPENSION INTRAMUSCULAR at 11:42

## 2019-10-07 RX ADMIN — KETOROLAC TROMETHAMINE 15 MG: 30 INJECTION, SOLUTION INTRAMUSCULAR; INTRAVENOUS at 11:43

## 2019-10-07 RX ADMIN — ENOXAPARIN SODIUM 40 MG: 40 INJECTION SUBCUTANEOUS at 08:43

## 2019-10-07 RX ADMIN — CEPHALEXIN 500 MG: 500 CAPSULE ORAL at 20:15

## 2019-10-07 RX ADMIN — BUDESONIDE AND FORMOTEROL FUMARATE DIHYDRATE 2 PUFF: 160; 4.5 AEROSOL RESPIRATORY (INHALATION) at 08:43

## 2019-10-07 RX ADMIN — APIXABAN 5 MG: 5 TABLET, FILM COATED ORAL at 18:05

## 2019-10-07 RX ADMIN — CEPHALEXIN 500 MG: 500 CAPSULE ORAL at 08:43

## 2019-10-07 RX ADMIN — APIXABAN 5 MG: 5 TABLET, FILM COATED ORAL at 11:37

## 2019-10-07 RX ADMIN — IPRATROPIUM BROMIDE AND ALBUTEROL SULFATE 3 ML: 2.5; .5 SOLUTION RESPIRATORY (INHALATION) at 13:45

## 2019-10-07 RX ADMIN — DILTIAZEM HYDROCHLORIDE 30 MG: 30 TABLET, FILM COATED ORAL at 00:21

## 2019-10-07 RX ADMIN — BUDESONIDE AND FORMOTEROL FUMARATE DIHYDRATE 2 PUFF: 160; 4.5 AEROSOL RESPIRATORY (INHALATION) at 18:05

## 2019-10-07 RX ADMIN — HYDROCODONE BITARTRATE AND ACETAMINOPHEN 1 TABLET: 5; 325 TABLET ORAL at 08:41

## 2019-10-07 RX ADMIN — IPRATROPIUM BROMIDE AND ALBUTEROL SULFATE 3 ML: 2.5; .5 SOLUTION RESPIRATORY (INHALATION) at 20:17

## 2019-10-07 RX ADMIN — ALUMINUM HYDROXIDE, MAGNESIUM HYDROXIDE, AND SIMETHICONE 30 ML: 200; 200; 20 SUSPENSION ORAL at 20:15

## 2019-10-07 RX ADMIN — DILTIAZEM HYDROCHLORIDE 30 MG: 30 TABLET, FILM COATED ORAL at 23:55

## 2019-10-07 RX ADMIN — ASPIRIN 81 MG: 81 TABLET, COATED ORAL at 08:43

## 2019-10-07 RX ADMIN — KETOROLAC TROMETHAMINE 15 MG: 30 INJECTION, SOLUTION INTRAMUSCULAR; INTRAVENOUS at 03:24

## 2019-10-07 RX ADMIN — HYDROCODONE BITARTRATE AND ACETAMINOPHEN 1 TABLET: 5; 325 TABLET ORAL at 16:42

## 2019-10-07 RX ADMIN — IPRATROPIUM BROMIDE AND ALBUTEROL SULFATE 3 ML: 2.5; .5 SOLUTION RESPIRATORY (INHALATION) at 08:11

## 2019-10-07 RX ADMIN — IPRATROPIUM BROMIDE AND ALBUTEROL SULFATE 3 ML: 2.5; .5 SOLUTION RESPIRATORY (INHALATION) at 01:59

## 2019-10-07 RX ADMIN — DILTIAZEM HYDROCHLORIDE 30 MG: 30 TABLET, FILM COATED ORAL at 18:04

## 2019-10-07 RX ADMIN — HYDROCODONE BITARTRATE AND ACETAMINOPHEN 1 TABLET: 5; 325 TABLET ORAL at 00:22

## 2019-10-07 RX ADMIN — DILTIAZEM HYDROCHLORIDE 30 MG: 30 TABLET, FILM COATED ORAL at 05:34

## 2019-10-07 RX ADMIN — MONTELUKAST SODIUM 10 MG: 10 TABLET, COATED ORAL at 22:04

## 2019-10-07 NOTE — ASSESSMENT & PLAN NOTE
· Converted to sinus rhythm, now on p o   Cardizem  · Follow-up echocardiogram  · Appreciate Cardiology input  · Will discharge patient on Eliquis

## 2019-10-07 NOTE — PLAN OF CARE
Problem: OCCUPATIONAL THERAPY ADULT  Goal: Performs self-care activities at highest level of function for planned discharge setting  See evaluation for individualized goals  Description  Treatment Interventions: ADL retraining, Functional transfer training, UE strengthening/ROM, Endurance training, Cognitive reorientation, Equipment evaluation/education, Compensatory technique education, Energy conservation          See flowsheet documentation for full assessment, interventions and recommendations  Note:   Limitation: Decreased ADL status, Decreased UE strength, Decreased Safe judgement during ADL, Decreased cognition, Decreased endurance, Decreased self-care trans, Decreased high-level ADLs  Prognosis: Fair  Assessment: Pt is a 82 y o  female who was admitted to [address] [place] [address] on 10/6/2019 with Atrial fibrillation with rapid ventricular response ([facility] )  At this time, patient is also affected by the comorbidities of: COPD, lumbar radiculopathy, and L elbow pain  Additionally, patient is affected by the following personal factors:steps to enter environment, limited home support, difficulty performing ADLS and difficulty performing IADLS   Orders placed for OT evaluation/treatment with up and OOB as tolerated orders  Prior to admission, patient reporting being independent with ADLs, ambulatory with no AD and lives alone in a one story house with 3 SAVANNAH  Upon OT evaluation, patient requires minimum assist for UB ADLs and minimum assist for LB ADLs  Occupational performance is affected by the following deficits: weakness, decreased balance, decreased tolerance, impaired memory, impaired sequencing, impaired problem solving, impulsivity, decreased safety awareness and increased pain  Based on the following information, patient would benefit from continued skilled OT treatment while in the hospital to address deficits and maximize level of functional independence with ADL's and functional mobility  Occupational Performance areas to address include: eating, grooming, bathing/shower, toilet hygiene, dressing, medication management, functional mobility, clothing management, meal prep and household maintenance  From OT standpoint, recommendation at time of d/c would be home OT       OT Discharge Recommendation: Home OT  OT - OK to Discharge: Yes(Once medically cleared)     Pierre Medrano OT

## 2019-10-07 NOTE — PROGRESS NOTES
Progress Note - Gail Shall 1935, 82 y o  female MRN: [id number]    Unit/Bed#: ICU 05 Encounter: [id number]    Primary Care Provider: Helen Garland MD   Date and time admitted to hospital: 10/6/2019  5:27 AM    1  New onset pAF   · Has been maintaining NSR  · Currently on cardizem 30mg PO q6h w/ hold parameters - plan to transition to long-acting tomorrow   · [id number] (sex, age)- pt agreeable to starting Eliquis, risks/benefits discussed  She ambulates without assistive devices and denies recent falls  She denies history of major bleeding or need for transfusions  · D/c ASA in the absence of known CAD   · Echo pending    2  Left olecranon bursitis - plan for outpatient ortho f/u  3  COPD  4  Tobacco use       She does not have an outpatient cardiologist - will arrange outpatient f/u  SUBJECTIVE  HPI: The patient was seen and examined at the bedside  No events overnight  She has no complaints today  She ambulated around the floor this morning without difficulty  She was asymptomatic while in rapid afib - she originally presented to the hospital due to left elbow pain      Telemetry: NSR    Most recent cardiac testing: Echo pending    Current medications:     Current Facility-Administered Medications:     acetaminophen (TYLENOL) tablet 650 mg, 650 mg, Oral, Q6H PRN, Jerman Decker MD, 650 mg at 10/06/19 1718    ALPRAZolam Rojean Million) tablet 0 5 mg, 0 5 mg, Oral, HS PRN, Jerman Decker MD, 0 5 mg at 10/06/19 2025    apixaban (ELIQUIS) tablet 5 mg, 5 mg, Oral, BID, Lucía Calloway PA-C, 5 mg at 10/07/19 1137    budesonide-formoterol (SYMBICORT) 160-4 5 mcg/act inhaler 2 puff, 2 puff, Inhalation, BID, Jerman Decker MD, 2 puff at 10/07/19 0843    cephalexin (KEFLEX) capsule 500 mg, 500 mg, Oral, Q12H [facility], Carmela Zapata MD, 500 mg at 10/07/19 0843    diltiazem (CARDIZEM) tablet 30 mg, 30 mg, Oral, Q6H [facility], [facility] MD James, 30 mg at 10/07/19 0534    furosemide (LASIX) tablet 20 mg, 20 mg, Oral, Daily, Smitty Boast, MD, Stopped at 10/07/19 0842    HYDROcodone-acetaminophen (NORCO) 5-325 mg per tablet 1 tablet, 1 tablet, Oral, Q6H PRN, Narda Longoria PA-C, 1 tablet at 10/07/19 0841    influenza vaccine, high-dose (FLUZONE HIGH-DOSE) IM injection SHANI 0 5 mL, 0 5 mL, Intramuscular, Once, Lemuel Potts MD    ipratropium-albuterol (DUO-NEB) 0 5-2 5 mg/3 mL inhalation solution 3 mL, 3 mL, Nebulization, Q6H, Smitty Boast, MD, 3 mL at 10/07/19 0811    ketorolac (TORADOL) injection 15 mg, 15 mg, Intravenous, Q6H PRN, Narda Longoria PA-C, 15 mg at 10/07/19 0324    montelukast (SINGULAIR) tablet 10 mg, 10 mg, Oral, HS, Smitty Boast, MD, 10 mg at 10/06/19 2150    ondansetron (ZOFRAN) injection 4 mg, 4 mg, Intravenous, Q6H PRN, Smitty Boast, MD     Allergies   Allergen Reactions    Fluticasone        OBJECTIVE  Vitals: Blood pressure 108/52, pulse 76, temperature 98 1 °F (36 7 °C), temperature source Tympanic, resp  rate 17, height 5' (1 524 m), weight 74 5 kg (164 lb 3 9 oz), SpO2 95 %  , Body mass index is 32 08 kg/m² ,   Orthostatic Blood Pressures      Most Recent Value   Blood Pressure  108/52 filed at 10/07/2019 1000   Patient Position - Orthostatic VS  Sitting filed at 10/07/2019 0800        Physical Exam   Physical Exam   Constitutional: She is oriented to person, place, and time  She appears well-developed and well-nourished  No distress  HENT:   Head: Normocephalic and atraumatic  Eyes: EOM are normal  No scleral icterus  Neck: Normal range of motion  Neck supple  Cardiovascular: Normal rate, regular rhythm, S1 normal and S2 normal    Pulmonary/Chest: Effort normal  She has no wheezes  She has no rales  Coarse breath sounds at the bases   Abdominal: Soft  Bowel sounds are normal    Musculoskeletal: She exhibits no edema  Neurological: She is alert and oriented to person, place, and time  Skin: Skin is warm and dry  Psychiatric: She has a normal mood and affect   Her behavior is normal    Nursing note and vitals reviewed        Lab Results:   Troponins:   Results from last 7 days   Lab Units 10/06/19  1448 10/06/19  1223 10/06/19  0711   TROPONIN I ng/mL 0 03 0 03 <0 03     BNP:    CBC with diff:   Results from last 7 days   Lab Units 10/07/19  0502 10/06/19  0711 10/04/19  0527   WBC Thousand/uL 9 00 10 00 7 10   HEMOGLOBIN g/dL 12 5 14 1 12 5   HEMATOCRIT % 39 1* 44 2 38 1*   PLATELETS Thousands/uL 210 296 224   RBC Million/uL 4 45 5 04 4 37     CMP:  Results from last 7 days   Lab Units 10/07/19  0502 10/06/19  0711 10/04/19  0527 10/03/19  0513 10/02/19  2152   POTASSIUM mmol/L 4 1 3 7 4 1 3 6 3 9   CHLORIDE mmol/L 103 96* 105 105 102   CO2 mmol/L 31 30 29 25 29   BUN mg/dL 7 17 9 16 18   CREATININE mg/dL 0 46* 0 57* 0 48* 0 57* 0 69   CALCIUM mg/dL 8 3* 9 1 8 0* 7 9* 8 3*   AST U/L  --  16  --   --  12*   ALT U/L  --  32  --   --  24   ALK PHOS U/L  --  51*  --   --  50*   EGFR ml/min/1 73sq m 93 87 92 87 81     TSH:     Coags:

## 2019-10-07 NOTE — OCCUPATIONAL THERAPY NOTE
Occupational Therapy Evaluation      Grace Tadeo    10/7/2019    Patient Active Problem List   Diagnosis    Rectal bleed    Asthma    COPD (chronic obstructive pulmonary disease) ([facility]    Cardiac disease    Diverticulosis of intestine with bleeding    Functional diarrhea    Colorectal polyps    Dyspnea    Effusion of bursa of left elbow    Cardiomegaly    Chronic anxiety    Chronic cystitis    Chronic low back pain    Congestive heart failure (HCC)    Deviated nasal septum    Diverticulosis of colon    Gastroesophageal reflux disease without esophagitis    Gout    Herpes zoster    History of angioedema    History of colonic polyps    Lower gastrointestinal hemorrhage    Lumbar radiculopathy    Macular degeneration of both eyes    Obesity    Osteoporosis    Other specified forms of chronic ischemic heart disease    Seasonal allergies    Panic attack    Vocal cord dysfunction    Colitis presumed infectious    Atrial fibrillation with rapid ventricular response ([facility] )    Left elbow pain       Past Medical History:   Diagnosis Date    Asthma     Blurred vision     Cardiac disease     COPD (chronic obstructive pulmonary disease) (HCC)     Diverticulosis     Emphysema lung ([facility] )        Past Surgical History:   Procedure Laterality Date    COLON SURGERY      COLONOSCOPY W/ POLYPECTOMY N/A 1/21/2019    Procedure: COLONOSCOPY W/ POLYPECTOMY;  Surgeon: Beka Ashley MD;  Location: [address] [place] [address] GI LAB; Service: General        10/07/19 0856   Note Type   Note type Eval only   Restrictions/Precautions   Weight Bearing Precautions Per Order No   Other Precautions O2;Multiple lines;Telemetry; Fall Risk;Pain  (5 L O2 via NC- utilizes at home )   Pain Assessment   Pain Assessment 0-10   Pain Score 7   Pain Type Acute pain   Pain Location Head   Pain Orientation Anterior   Pain Descriptors Aching; Sharp   Pain Frequency Constant/continuous   Pain Onset Ongoing   Clinical Progression Not changed   Hospital Pain Intervention(s) Ambulation/increased activity;Repositioned;Medication (See MAR)   Multiple Pain Sites Yes   Pain 2   Pain Score 2 5   Pain Location 2 Hip   Pain Orientation 2 Left; Anterior   Pain Radiating Towards 2 Pain radiates down towards knee   Pain Descriptors 2 Aching;Discomfort   Pain Frequency 2 Intermittent  (during standing )   Pain Intervention(s) 2 Medication (See MAR); Ambulation/increased activity;Repositioned   Home Living   Type of [address] One level;Performs ADLs on one level; Able to live on main level with bedroom/bathroom;Stairs to enter with rails  (3 SAVANNAH)   Bathroom Shower/Tub Tub/shower unit   Bathroom Toilet Standard   Bathroom Equipment Grab bars in shower;Grab bars around toilet   [address]   (no AD used at baseline )   Prior Function   Level of [place] Independent with ADLs and functional mobility; Needs assistance with IADLs   Lives With Alone   Receives Help From Friend(s); Neighbor   ADL Assistance Independent   IADLs Needs assistance  (transportation only )   Falls in the last 6 months 0   Vocational Full time employment   Lifestyle   Autonomy Patient reporting being independent with ADLs, ambulatory with no AD and lives alone in a one story house with 3 SAVANNAH   Reciprocal Relationships friends    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    ADL   [address] 5  [address] [facility] 5  [address] [facility] 4  [address] 4  [address] 4  [address]  4  Minimal Assistance   Bed Mobility   Additional Comments Pt OOB and seated in chair prior to eval    Transfers   Sit to Stand 4  Minimal assistance   Additional items Assist x 1; Impulsive;Verbal cues   Stand to Sit 4  Minimal assistance   Additional items Assist x 1;Armrests; Verbal cues; Impulsive   Stand pivot 4  Minimal assistance   Additional items Assist x 1; Impulsive;Verbal cues   Functional Mobility   Functional Mobility 4  Minimal assistance   Additional items Rolling walker   Balance   Static Sitting Good   Dynamic Sitting Fair +   Static Standing Fair   Dynamic Standing Fair -   Activity Tolerance   Activity Tolerance Patient limited by pain; Patient limited by fatigue   Nurse Made Aware RN [facility] verbalized pt appropriate for therapy    RUE Assessment   RUE Assessment WFL  (grossly 4-/5 MMT)   LUE Assessment   LUE Assessment WFL  (grossly 4-/5 MMT)   Hand Function   Gross Motor Coordination Functional   Fine Motor Coordination Functional   Cognition   Overall Cognitive Status WFL   Arousal/Participation Alert; Responsive; Cooperative   Attention Within functional limits   Orientation Level Oriented X4   Memory Within functional limits   Following Commands Follows one step commands with increased time or repetition   Comments Mini-Cog assessment performed today  Version 1 was given  Patient able to recall 1/3 words  Patient able to draw an abnormal clock with the incorrect time  Total score of test was 1/5 indicating  further screening of cognition is recommended  Cognition Assessment Tools   (Mini-cog )   Score 1   Assessment   Limitation Decreased ADL status; Decreased UE strength;Decreased Safe judgement during ADL;Decreased cognition;Decreased endurance;Decreased self-care trans;Decreased high-level ADLs   Prognosis Fair   Assessment Pt is a 82 y o  female who was admitted to [address] [place] [address] on 10/6/2019 with Atrial fibrillation with rapid ventricular response ([address] )  At this time, patient is also affected by the comorbidities of: COPD, lumbar radiculopathy, and L elbow pain  Additionally, patient is affected by the following personal factors:steps to enter environment, limited home support, difficulty performing ADLS and difficulty performing IADLS   Orders placed for OT evaluation/treatment with up and OOB as tolerated orders  Prior to admission, patient reporting being independent with ADLs, ambulatory with no AD and lives alone in a one story house with 3 SAVANNAH  Upon OT evaluation, patient requires minimum assist for UB ADLs and minimum assist for LB ADLs  Occupational performance is affected by the following deficits: weakness, decreased balance, decreased tolerance, impaired memory, impaired sequencing, impaired problem solving, impulsivity, decreased safety awareness and increased pain  Based on the following information, patient would benefit from continued skilled OT treatment while in the hospital to address deficits and maximize level of functional independence with ADL's and functional mobility  Occupational Performance areas to address include: eating, grooming, bathing/shower, toilet hygiene, dressing, medication management, functional mobility, clothing management, meal prep and household maintenance  From OT standpoint, recommendation at time of d/c would be home OT  Goals   Patient Goals to go home soon    Plan   Treatment Interventions ADL retraining;Functional transfer training;UE strengthening/ROM; Endurance training;Cognitive reorientation;Equipment evaluation/education; Compensatory technique education; Energy conservation   Goal Expiration Date 10/17/19   OT Treatment Day 0   OT Frequency 3-5x/wk   Recommendation   OT Discharge Recommendation Home OT   OT - OK to Discharge Yes  (Once medically cleared)   Barthel Index   Feeding 5   Bathing 0   Grooming Score 5   Dressing Score 5   Bladder Score 0   Bowels Score 5   Toilet Use Score 5   Transfers (Bed/Chair) Score 10   Mobility (Level Surface) Score 0   Stairs Score 0   Barthel Index Score 35     GOALS:    Pt will achieve the following within specified time frame: STG  Pt will achieve the following goals within 5 days    *ADL transfers with (S) for inc'd independence with ADLs/purposeful tasks    *UB ADL with (S) for inc'd independence with self cares    *LB ADL with (S) using AE prn for inc'd independence with self cares    *Toileting with (S) for clothing management and hygiene for return to PLOF with personal care    *Increase static stand balance to fair+ and dyn stand balance to fair+ for inc'd safety with standing purposeful tasks    *Increase stand tolerance x5 m for inc'd tolerance with standing purposeful tasks    *Participate in 10m UE therex to increase overall stamina/activity tolerance for purposeful tasks    *Bed mobility- (S) for inc'd independence to manage own comfort and initiate EOB & OOB purposeful tasks    *Patient will verbalize 3 safety awareness/ principles to prevent falls in the home setting  *Patient will verbalize and demonstrate use of energy conservation/deep breathing techniques and work simplification skills during functional activities with no verbal cues  Pt will achieve the following within specified time frame: LTG  Pt will achieve the following goals within 10 days    *ADL transfers with (I) for inc'd independence with ADLs/purposeful tasks    *Self Feeding- (I) for inc'd independence with providing self nourishment    *UB ADL with (I) for inc'd independence with self cares    *LB ADL with (I) using AE prn for inc'd independence with self cares    *Toileting with (I) for clothing management and hygiene for return to PLOF with personal care    *Increase static stand balance to good and dyn stand balance to good for inc'd safety with standing purposeful tasks    *Increase stand tolerance x10 m for inc'd tolerance with standing purposeful tasks    *Bed mobility- (I) for inc'd independence to manage own comfort and initiate EOB & OOB purposeful tasks    *Patient will increase OOB/sitting tolerance to 2-4 hours per day to increase participation in self-care and leisure tasks with no s/s of exertion           Radha Manning MS, OTR/L

## 2019-10-07 NOTE — PROGRESS NOTES
Progress Note - Sukumar Marie 1935, 82 y o  female MRN: [id number]    Unit/Bed#: ICU 05 Encounter: [id number]    Primary Care Provider: Alec Donovan MD   Date and time admitted to hospital: 10/6/2019  5:27 AM        Left elbow pain  Assessment & Plan  · Secondary to olecranon bursitis, improving  · Appreciate Orthopedic input  · Follow up as an outpatient as per Orthopedic recommendations    Lumbar radiculopathy  Assessment & Plan  · Pain control as needed  · Follow up with Orthopedic surgery    COPD (chronic obstructive pulmonary disease) ([facility] )  Assessment & Plan  · Stable, no signs of acute exacerbation  · Continue oxygen and nebulizers as needed    * Atrial fibrillation with rapid ventricular response (HCC)  Assessment & Plan  · Converted to sinus rhythm, now on p o  Cardizem  · Follow-up echocardiogram  · Appreciate Cardiology input  · Will discharge patient on Eliquis     Chronic hypoxic respiratory failure - continue home oxygen regimen    VTE Pharmacologic Prophylaxis: Pharmacologic: Heparin    Patient Centered Rounds: I have performed bedside rounds with nursing staff today  Discussions with Specialists or Other Care Team Provider: n/a  Education and Discussions with Family / Patient: patient    Current Length of Stay: 1 day(s)    Current Patient Status: Inpatient   Certification Statement: The patient will continue to require additional inpatient hospital stay due to afib    Discharge Plan:  Likely home tomorrow    Code Status: Level 1 - Full Code    Subjective:   Patient seen examined  No acute events overnight  Feels better    Objective:     Vitals:   Temp (24hrs), Av 9 °F (36 6 °C), Min:96 4 °F (35 8 °C), Max:99 1 °F (37 3 °C)    Temp:  [96 4 °F (35 8 °C)-99 1 °F (37 3 °C)] 96 4 °F (35 8 °C)  HR:  [] 73  Resp:  [19-39] 27  BP: ()/(44-74) 88/53  SpO2:  [87 %-100 %] 96 %  Body mass index is 32 08 kg/m²       Input and Output Summary (last 24 hours): Intake/Output Summary (Last 24 hours) at 10/7/2019 0843  Last data filed at 10/7/2019 0601  Gross per 24 hour   Intake 2985 08 ml   Output 5278 ml   Net -2292 92 ml       Physical Exam:     Physical Exam   Constitutional: She is oriented to person, place, and time  She appears well-developed and well-nourished  HENT:   Head: Normocephalic and atraumatic  Eyes: Pupils are equal, round, and reactive to light  EOM are normal    Neck: Normal range of motion  Neck supple  Cardiovascular: Normal rate and regular rhythm  Pulmonary/Chest: Effort normal  No respiratory distress  Coarse breath sounds noted bilaterally   Abdominal: Soft  Bowel sounds are normal    Musculoskeletal: Normal range of motion  She exhibits no edema  Left elbow bursitis   Neurological: She is alert and oriented to person, place, and time  Skin: Skin is warm  Capillary refill takes less than 2 seconds  Psychiatric: She has a normal mood and affect  Her behavior is normal  Judgment and thought content normal    Nursing note and vitals reviewed  Additional Data:     Labs:    Results from last 7 days   Lab Units 10/07/19  0502   WBC Thousand/uL 9 00   HEMOGLOBIN g/dL 12 5   HEMATOCRIT % 39 1*   PLATELETS Thousands/uL 210   NEUTROS PCT % 84*   LYMPHS PCT % 8*   MONOS PCT % 6   EOS PCT % 1     Results from last 7 days   Lab Units 10/07/19  0502 10/06/19  0711   POTASSIUM mmol/L 4 1 3 7   CHLORIDE mmol/L 103 96*   CO2 mmol/L 31 30   BUN mg/dL 7 17   CREATININE mg/dL 0 46* 0 57*   CALCIUM mg/dL 8 3* 9 1   ALK PHOS U/L  --  51*   ALT U/L  --  32   AST U/L  --  16                   * I Have Reviewed All Lab Data Listed Above  * Additional Pertinent Lab Tests Reviewed: [facility]  [facility]    Imaging:  Imaging Reports Reviewed Today Include: n/a    Recent Cultures (last 7 days):     Results from last 7 days   Lab Units 10/03/19  1026   C DIFF TOXIN B  NEGATIVE for C difficle toxin by PCR          Last 24 Hours Medication List:     Current Facility-Administered Medications:  acetaminophen 650 mg Oral Q6H PRN Smitty Boast, MD   ALPRAZolam 0 5 mg Oral HS PRN Smitty Boast, MD   aspirin 81 mg Oral Daily Smitty Boast, MD   budesonide-formoterol 2 puff Inhalation BID Smitty Boast, MD   cephalexin 500 mg Oral Q12H [address] Ze Holliday MD   diltiazem 30 mg Oral Q6H [address] Janeen Car MD   enoxaparin 40 mg Subcutaneous Daily Smitty Boast, MD   furosemide 20 mg Oral Daily Smitty Boast, MD   HYDROcodone-acetaminophen 1 tablet Oral Q6H PRN Narda Longoria PA-C   influenza vaccine 0 5 mL Intramuscular Once Lemuel Potts MD   ipratropium-albuterol 3 mL Nebulization Bela Sykes MD   ketorolac 15 mg Intravenous Q6H PRN Narda Longoria PA-C   montelukast 10 mg Oral HS Smitty Boast, MD   ondansetron 4 mg Intravenous Q6H PRN Smitty Boast, MD        Today, Patient Was Seen By: Lemuel Potts MD    ** Please Note: Dictation voice to text software may have been used in the creation of this document   **

## 2019-10-07 NOTE — PHYSICAL THERAPY NOTE
Physical Therapy Evaluation     Patient's Name: Amina Agarwal    Admitting Diagnosis  Elbow pain [M25 529]  Left elbow pain [M25 522]  Atrial fibrillation with rapid ventricular response ([facility] ) [I48 91]    Problem List  Patient Active Problem List   Diagnosis    Rectal bleed    Asthma    COPD (chronic obstructive pulmonary disease) ([facility] [facility] )    Cardiac disease    Diverticulosis of intestine with bleeding    Functional diarrhea    Colorectal polyps    Dyspnea    Effusion of bursa of left elbow    Cardiomegaly    Chronic anxiety    Chronic cystitis    Chronic low back pain    Congestive heart failure ([facility] [facility] )    Deviated nasal septum    Diverticulosis of colon    Gastroesophageal reflux disease without esophagitis    Gout    Herpes zoster    History of angioedema    History of colonic polyps    Lower gastrointestinal hemorrhage    Lumbar radiculopathy    Macular degeneration of both eyes    Obesity    Osteoporosis    Other specified forms of chronic ischemic heart disease    Seasonal allergies    Panic attack    Vocal cord dysfunction    Colitis presumed infectious    Atrial fibrillation with rapid ventricular response ([facility] [address] )    Left elbow pain       Past Medical History  Past Medical History:   Diagnosis Date    Asthma     Blurred vision     Cardiac disease     [facility]     Diverticulosis     Emphysema lung ([facility] [address] )        Past Surgical History  Past Surgical History:   Procedure Laterality Date    COLON SURGERY      COLONOSCOPY W/ POLYPECTOMY N/A 1/21/2019    Procedure: COLONOSCOPY W/ POLYPECTOMY;  Surgeon: Yeni Bob MD;  Location: [address] [place] [address] GI LAB; Service: General        10/07/19 0856   Note Type   Note type Eval/Treat   Pain Assessment   Pain Assessment 0-10   Pain Score 7   Pain Type Acute pain   Pain Location Head   Pain Orientation Anterior   Pain Descriptors Aching; Sharp   Pain Frequency Constant/continuous   Pain Onset Ongoing Clinical Progression Not changed   Patient's Stated Pain Goal No pain   Hospital Pain Intervention(s) Medication (See MAR); Repositioned; Ambulation/increased activity   Home Living   Type of [address] One level;Performs ADLs on one level; Able to live on main level with bedroom/bathroom;Stairs to enter with rails  (3 [address]ANNA[address])   Bathroom Shower/Tub Tub/shower unit   Bathroom Toilet Standard   Bathroom Equipment Grab bars in shower;Grab bars around toilet   [address]   (PTA, pt  did not utilize an AD )   Prior Function   Level of [place] Independent with ADLs and functional mobility; Needs assistance with IADLs   Lives With Alone   Receives Help From Friend(s); Neighbor   ADL Assistance Independent   IADLs Needs assistance  (transportation)   Falls in the last 6 months 0   Vocational Full time employment   Restrictions/Precautions   Weight Bearing Precautions Per Order No   Other Precautions O2;Multiple lines;Telemetry; Fall Risk;Pain  (5 L O2 via NC, utilizes at home prn)   General   Family/Caregiver Present No   Cognition   Overall Cognitive Status WFL   Arousal/Participation Alert   Orientation Level Oriented X4   Memory Within functional limits   Following Commands Follows one step commands with increased time or repetition   RUE Assessment   RUE Assessment   (please see OT assessment)   LUE Assessment   LUE Assessment   (please see OT assessment)   RLE Assessment   RLE Assessment X   Strength RLE   R Hip Flexion 3+/5   R Knee Extension 3+/5   R Ankle Dorsiflexion 3+/5   LLE Assessment   LLE Assessment X   Strength LLE   L Hip Flexion 3+/5   L Knee Extension 3+/5   L Ankle Dorsiflexion 3+/5   Coordination   Movements are Fluid and Coordinated 1   Light Touch   RLE Light Touch Grossly intact   LLE Light Touch Grossly intact   Sharp/Dull   RLE Sharp/Dull Grossly intact   LLE Sharp/Dull Grossly intact   Bed Mobility   Additional Comments DNT bed mobility as pt  was sitting OOB in chair upon arrival    Transfers   Sit to Stand 4  Minimal assistance   Additional items Assist x 1; Armrests; Increased time required;Verbal cues   Stand to Sit 4  Minimal assistance   Additional items Assist x 1; Armrests; Verbal cues   Stand pivot 4  Minimal assistance   Additional items Assist x 1;Verbal cues   Ambulation/Elevation   Gait pattern Improper Weight shift; Foward flexed; Short stride   Gait Assistance 4  Minimal assist   Additional items Assist x 1;Verbal cues; Tactile cues   Assistive Device Rolling walker  (x 10 feet, 2nd trial w/o AD x 10 feet)   Distance 5 feet forward, 5 feet backward  (x 2 trials w/ breathing conservation techniques)   Stair Management Assistance Not tested   Balance   Static Sitting Good   Dynamic Sitting Fair +   Static Standing Fair   Dynamic Standing Fair -   Ambulatory Fair -   Endurance Deficit   Endurance Deficit Yes   Endurance Deficit Description SOB, JOHNSON upon ambulatory progression  (BP 98/59  HR 98->126->110->97)   Activity Tolerance   Activity Tolerance Patient limited by pain; Patient limited by fatigue;Treatment limited secondary to medical complications (Comment)   Nurse Made Aware yes, Radhika RN   Assessment   Prognosis Good   Problem List Decreased strength;Decreased endurance; Impaired balance;Decreased mobility; Decreased safety awareness; Impaired judgement;Pain   Assessment Pt is 82 y o  female seen for PT evaluation s/p admit to [facility] on 10/6/2019 w/ Atrial fibrillation with rapid ventricular response ([address] )  PT consulted to assess pt's functional mobility and d/c needs  Order placed for PT eval and tx, w/ activity as tolerated order  Comorbidities affecting pt's physical performance at time of assessment include: SOB,JOHNSON, A-fib w/RVR,COPD, pain w/headache  PTA, pt was independent w/ all functional mobility w/ o AD   Personal factors affecting pt at time of IE include: ambulating w/ assistive device, inability to ambulate household distances, inability to navigate community distances, inability to navigate level surfaces w/o external assistance, unable to perform dynamic tasks in community, unable to perform physical activity, limited insight into impairments and inability to perform ADLs  Please find objective findings from PT assessment regarding body systems outlined above with impairments and limitations including weakness, impaired balance, decreased endurance, gait deviations, pain, decreased activity tolerance, decreased functional mobility tolerance, decreased safety awareness, impaired judgement, fall risk and SOB upon exertion  The following objective measures performed on IE also reveal limitations: Barthel Index: 35/100  Pt's clinical presentation is currently unstable/unpredictable  Pt to benefit from continued PT tx to address deficits as defined above and maximize level of functional independent mobility and consistency  From PT/mobility standpoint, recommendation at time of d/c would be Home PT pending progress in order to facilitate return to PLOF  Goals   Patient Goals go home soon   LTG Expiration Date 10/17/19   Long Term Goal #1 1 )Patient will complete bed mobility modified I  for decrease need for caregiver assistance, decrease burden of care  2 ) Patient will complete transfers modified I to decrease risk of falls, facilitate upright standing posture  3 ) BLE strength to greater than/equal to 4/5 gross musculature to increase ability to safely transfer, control descent to chair  4 ) Patient will exhibit increase dynamic standing balance to Good, for 1-3 minutes  without LOB modified I to improve activity tolerance & endurance  5 ) Patient will exhibit increase dynamic ambulatory balance to Good for 100-200 feet w/ AD prn,distant supervision of 1 to improve ability to mobilize to toilet, chair and decrease risk for additional medical complications   6 ) Patient will exhibit good self monitoring and ability to follow 2 step commands to increase complexity of tasks and resume ADL's without LOB  PT Treatment Day 1   Plan   Treatment/Interventions Functional transfer training;LE strengthening/ROM; Therapeutic exercise; Endurance training;Patient/family training;Equipment eval/education; Bed mobility;Gait training; Compensatory technique education;Spoke to nursing;OT   PT Frequency Other (Comment)  (3-5x/wk)   Recommendation   Recommendation Home PT   PT - OK to Discharge Yes  (if medically stable)   Additional Comments Upon conclusion, pt  was sitting OOB in chair w/all needs within reach  Barthel Index   Feeding 5   Bathing 0   Grooming Score 5   Dressing Score 5   Bladder Score 0   Bowels Score 5   Toilet Use Score 5   Transfers (Bed/Chair) Score 10   Mobility (Level Surface) Score 0   Stairs Score 0   Barthel Index Score 35     Additional skilled interventions: gait training x 10 minutes  Including: proper WS, increasing MIGUEL ÁNGEL with AMB to stabilize balance, maintaining appropriate pedal position with directional changes, and breathing conservation to prevent additional SOB and potential LOB  Patient was cooperative,demonstrated and verbalized understanding upon conclusion      Edenilson Rivera, PT

## 2019-10-07 NOTE — ASSESSMENT & PLAN NOTE
· Secondary to olecranon bursitis, improving  · Appreciate Orthopedic input  · Follow up as an outpatient as per Orthopedic recommendations

## 2019-10-07 NOTE — NURSING NOTE
Verbal report given to receiving rn on gmf  Veloz removed and emptied for 725 ml urine  DTV at 2030 tonight  Pt transported to [facility] via w/c with belongings  Remains on 5l n/c

## 2019-10-07 NOTE — PLAN OF CARE
Problem: PHYSICAL THERAPY ADULT  Goal: Performs mobility at highest level of function for planned discharge setting  See evaluation for individualized goals  Description  Treatment/Interventions: Functional transfer training, LE strengthening/ROM, Therapeutic exercise, Endurance training, Patient/family training, Equipment eval/education, Bed mobility, Gait training, Compensatory technique education, Spoke to nursing, OT     See flowsheet documentation for full assessment, interventions and recommendations  Oliva Joseph, PT     Note:   Prognosis: Good  Problem List: Decreased strength, Decreased endurance, Impaired balance, Decreased mobility, Decreased safety awareness, Impaired judgement, Pain  Assessment: Pt is 82 y o  female seen for PT evaluation s/p admit to [facility] on 10/6/2019 w/ Atrial fibrillation with rapid ventricular response ([facility] [address] )  PT consulted to assess pt's functional mobility and d/c needs  Order placed for PT eval and tx, w/ activity as tolerated order  Comorbidities affecting pt's physical performance at time of assessment include: SOB,JOHNSON, A-fib w/RVR,COPD, pain w/headache  PTA, pt was independent w/ all functional mobility w/ o AD  Personal factors affecting pt at time of IE include: ambulating w/ assistive device, inability to ambulate household distances, inability to navigate community distances, inability to navigate level surfaces w/o external assistance, unable to perform dynamic tasks in community, unable to perform physical activity, limited insight into impairments and inability to perform ADLs  Please find objective findings from PT assessment regarding body systems outlined above with impairments and limitations including weakness, impaired balance, decreased endurance, gait deviations, pain, decreased activity tolerance, decreased functional mobility tolerance, decreased safety awareness, impaired judgement, fall risk and SOB upon exertion   The following objective measures performed on IE also reveal limitations: Barthel Index: 35/100  Pt's clinical presentation is currently unstable/unpredictable  Pt to benefit from continued PT tx to address deficits as defined above and maximize level of functional independent mobility and consistency  From PT/mobility standpoint, recommendation at time of d/c would be Home PT pending progress in order to facilitate return to PLOF  Recommendation: Home PT     PT - OK to Discharge: Yes(if medically stable)    See flowsheet documentation for full assessment     Katerine Seen, PT

## 2019-10-07 NOTE — SOCIAL WORK
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 bus route  Pt states her son lives in [place], daughter in [place] and has a son in [place]  Pt states she did not have time to see Dr Nora Hagen as she came back to the hospital within 2 days  PT is recommending home PT  Pt is refusing same " I will not take off work and lose my home" Pt wear oxygen 5L/min 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  Oxygen supplier is [employer]  Pt gets her medications from [employer] in [place]  PT states the  Erik Padgett is her POA  She does not want to bother her children with anything  Her friend will transport her home  Patient/caregiver received discharge checklist   Content reviewed  Patient/caregiver encouraged to participate in discharge plan of care prior to discharge home  CM reviewed d/c planning process including the following: identifying help at home, patient preference for d/c planning needs, availability of treatment team to discuss questions or concerns patient and/or family may have regarding understanding medications and recognizing signs and symptoms once discharged  CM also encouraged patient to follow up with all recommended appointments after discharge  Patient advised of importance for patient and family to participate in managing patients medical well being

## 2019-10-08 VITALS
BODY MASS INDEX: 32.33 KG/M2 | WEIGHT: 164.68 LBS | OXYGEN SATURATION: 98 % | SYSTOLIC BLOOD PRESSURE: 126 MMHG | RESPIRATION RATE: 18 BRPM | HEART RATE: 84 BPM | HEIGHT: 60 IN | DIASTOLIC BLOOD PRESSURE: 60 MMHG | TEMPERATURE: 98.1 F

## 2019-10-08 PROCEDURE — 94640 AIRWAY INHALATION TREATMENT: CPT

## 2019-10-08 PROCEDURE — 97110 THERAPEUTIC EXERCISES: CPT

## 2019-10-08 PROCEDURE — 99239 HOSP IP/OBS DSCHRG MGMT >30: CPT | Performed by: INTERNAL MEDICINE

## 2019-10-08 PROCEDURE — 94760 N-INVAS EAR/PLS OXIMETRY 1: CPT

## 2019-10-08 PROCEDURE — 97116 GAIT TRAINING THERAPY: CPT

## 2019-10-08 PROCEDURE — 97530 THERAPEUTIC ACTIVITIES: CPT

## 2019-10-08 RX ORDER — DILTIAZEM HYDROCHLORIDE 180 MG/1
180 CAPSULE, COATED, EXTENDED RELEASE ORAL DAILY
Qty: 30 CAPSULE | Refills: 0 | Status: SHIPPED | OUTPATIENT
Start: 2019-10-08 | End: 2019-10-15 | Stop reason: HOSPADM

## 2019-10-08 RX ADMIN — BUDESONIDE AND FORMOTEROL FUMARATE DIHYDRATE 2 PUFF: 160; 4.5 AEROSOL RESPIRATORY (INHALATION) at 08:52

## 2019-10-08 RX ADMIN — IPRATROPIUM BROMIDE AND ALBUTEROL SULFATE 3 ML: 2.5; .5 SOLUTION RESPIRATORY (INHALATION) at 02:06

## 2019-10-08 RX ADMIN — ACETAMINOPHEN 650 MG: 325 TABLET ORAL at 12:52

## 2019-10-08 RX ADMIN — ALPRAZOLAM 0.5 MG: 0.5 TABLET ORAL at 02:20

## 2019-10-08 RX ADMIN — DILTIAZEM HYDROCHLORIDE 30 MG: 30 TABLET, FILM COATED ORAL at 06:30

## 2019-10-08 RX ADMIN — HYDROCODONE BITARTRATE AND ACETAMINOPHEN 1 TABLET: 5; 325 TABLET ORAL at 10:12

## 2019-10-08 RX ADMIN — DILTIAZEM HYDROCHLORIDE 30 MG: 30 TABLET, FILM COATED ORAL at 12:53

## 2019-10-08 RX ADMIN — APIXABAN 5 MG: 5 TABLET, FILM COATED ORAL at 08:50

## 2019-10-08 RX ADMIN — KETOROLAC TROMETHAMINE 15 MG: 30 INJECTION, SOLUTION INTRAMUSCULAR; INTRAVENOUS at 08:50

## 2019-10-08 RX ADMIN — IPRATROPIUM BROMIDE AND ALBUTEROL SULFATE 3 ML: 2.5; .5 SOLUTION RESPIRATORY (INHALATION) at 07:40

## 2019-10-08 RX ADMIN — CEPHALEXIN 500 MG: 500 CAPSULE ORAL at 08:50

## 2019-10-08 RX ADMIN — FUROSEMIDE 20 MG: 20 TABLET ORAL at 08:50

## 2019-10-08 RX ADMIN — ACETAMINOPHEN 650 MG: 325 TABLET ORAL at 02:19

## 2019-10-08 NOTE — PHYSICAL THERAPY NOTE
10/08/19 0907   Pain Assessment   Pain Assessment 0-10   Pain Score 7   Pain Type Chronic pain   Pain Location Back   Pain Descriptors Sharp; Shooting   Pain Onset Ongoing   Restrictions/Precautions   Weight Bearing Precautions Per Order No   Other Precautions Fall Risk;O2;Pain   General   Chart Reviewed Yes   Response to Previous Treatment Patient with no complaints from previous session  Family/Caregiver Present Yes   Cognition   Overall Cognitive Status WFL   Arousal/Participation Alert; Responsive; Cooperative   Attention Within functional limits   Orientation Level Oriented X4   Memory Within functional limits   Following Commands Follows one step commands without difficulty   Subjective   Subjective "I'm going home today, right now!"   Bed Mobility   Rolling R 6  Modified independent   Additional items HOB elevated; Bedrails   Rolling L 6  Modified independent   Additional items HOB elevated; Bedrails   Supine to Sit 6  Modified independent   Additional items Bedrails; Increased time required   Transfers   Sit to Stand 5  Supervision   Additional items Impulsive   Stand to Sit 5  Supervision   Additional items Armrests   Ambulation/Elevation   Gait pattern Improper Weight shift; Forward Flexion; Short stride   Gait Assistance 5  Supervision   Additional items Verbal cues   Assistive Device None   Distance 15 ft  (only as far as O2 tubing would allow)   Balance   Static Sitting Good   Dynamic Sitting Fair +   Static Standing Fair   Dynamic Standing Fair -   Ambulatory Fair -   Endurance Deficit   Endurance Deficit Yes   Activity Tolerance   Activity Tolerance Patient limited by pain;Treatment limited secondary to medical complications (Comment)   Exercises   Quad Sets Sitting;25 reps;AROM; Bilateral   Glute Sets Sitting;25 reps;AROM; Bilateral   Hip Flexion Sitting;25 reps;AROM; Bilateral   Hip Abduction Sitting;25 reps;AROM; Bilateral   Hip Adduction Sitting;25 reps;AROM; Bilateral   Knee AROM [employer] Sitting;25 reps;AROM; Bilateral   Ankle Pumps Sitting;25 reps;AROM; Bilateral   Assessment   Prognosis Good   Problem List Decreased strength;Decreased endurance; Impaired balance;Decreased mobility; Impaired judgement;Decreased safety awareness;Pain   Assessment Pt  found resting in bed but was agreeable to participate with some encouragement  Pt  performed bed mobility tasks including rolling from side to side with MI, using BSR's for assitance and increased time needed due to reports of 7/10 pain level in her back with sharp shooting pain  Pt  then transfered from supine to sit with MI, with HOB elevated and grabbing for the BSR to assist patient to the EOB  Pt  then transfered from sit to stand with supervision and ambulated with no AD for 15 ft which was as far as her O2 tubing would allow  Pt  was then agreeable to sit OOB in stationary chair where she performed 25 reps of active ther ex as per treatment flow sheet  Pt  did tolerate treatment well but was adement throughout treatment and very vocal to her family and  that she was going home today and was not accepting of any service or help  Pt  was on 5 LO2 during treatment and very vocal without any signs of SOB  Pt  was educated on the benefit of home care and continued therapy to help improve her functional activity tolrerance to help improve her functional mobility to prevent frequent returns to the hospital   Pt  also eduacted on med alerts for safety which she was not willing to accept as well as water proof bags that can hold her cell phone so she can keep it with her at all time, even in the shower, for peace of mind  Pt  was left with call needs in reach with her family and  attempting to educater her on  services that would be beneficail for her  Continue current POC and progress as tolerated     Goals   Patient Goals to get out of here now   PT Treatment Day 2   Plan   Treatment/Interventions Functional transfer training;LE strengthening/ROM; Therapeutic exercise; Endurance training;Patient/family training;Bed mobility;Gait training;Spoke to case management; Family   Progress Progressing toward goals   PT Frequency   (3-5x/wk)   Recommendation   Recommendation Home with family support;Home PT   PT - OK to Discharge Yes   Additional Comments if medically stable   Vilma Tran, MALLORY

## 2019-10-08 NOTE — SOCIAL WORK
Spoke to the [facility] and the pt at the bedside about the discharge plan  PT is recommending home PT, however pt continues to refuse same  Pt very adamant stating she will not have home care  Spoke to [facility] about getting the pt to the PCP appointments  POA indicated it is difficult as she works and they can't get there on time  POA states she has the number for [employer]ol[employer] to call and change to a Abbie Walsh PCP in the [facility]  Pt continues to state she is going back to work as the   POA states she only does this from 1pm-5pm   POA states she picks up all her medications at [facility] in [place] as they are auto refill  There is no problem with picking  up her medications  Offered me ds to [facility], however declined  Offered medical alert system information,  Pt POA states she is going to get her the waterproof case for her cell phone she can wear around her neck which is cheaper from [address]    POA or neighbor will transport home

## 2019-10-08 NOTE — ASSESSMENT & PLAN NOTE
· Converted to sinus rhythm, now on p o   Cardizem  · I called relatively unremarkable  · Appreciate Cardiology input  · Will discharge patient on Eliquis and long-acting Cardizem

## 2019-10-08 NOTE — PLAN OF CARE
Problem: Potential for Falls  Goal: Patient will remain free of falls  Description  INTERVENTIONS:  - Assess patient frequently for physical needs  -  Identify cognitive and physical deficits and behaviors that affect risk of falls    -  [place] fall precautions as indicated by assessment   - Educate patient/family on patient safety including physical limitations  - Instruct patient to call for assistance with activity based on assessment  - Modify environment to reduce risk of injury  - Consider OT/PT consult to assist with strengthening/mobility  Outcome: Progressing     Problem: PAIN - ADULT  Goal: Verbalizes/displays adequate comfort level or baseline comfort level  Description  Interventions:  - Encourage patient to monitor pain and request assistance  - Assess pain using appropriate pain scale  - Administer analgesics based on type and severity of pain and evaluate response  - Implement non-pharmacological measures as appropriate and evaluate response  - Consider cultural and social influences on pain and pain management  - Notify physician/advanced practitioner if interventions unsuccessful or patient reports new pain  Outcome: Progressing     Problem: INFECTION - ADULT  Goal: Absence or prevention of progression during hospitalization  Description  INTERVENTIONS:  - Assess and monitor for signs and symptoms of infection  - Monitor lab/diagnostic results  - Monitor all insertion sites, i e  indwelling lines, tubes, and drains  - Monitor endotracheal if appropriate and nasal secretions for changes in amount and color  - [place] appropriate cooling/warming therapies per order  - Administer medications as ordered  - Instruct and encourage patient and family to use good hand hygiene technique  - Identify and instruct in appropriate isolation precautions for identified infection/condition  Outcome: Progressing     Problem: SAFETY ADULT  Goal: Maintain or return to baseline ADL function  Description  INTERVENTIONS:  -  Assess patient's ability to carry out ADLs; assess patient's baseline for ADL function and identify physical deficits which impact ability to perform ADLs (bathing, care of mouth/teeth, toileting, grooming, dressing, etc )  - Assess/evaluate cause of self-care deficits   - Assess range of motion  - Assess patient's mobility; develop plan if impaired  - Assess patient's need for assistive devices and provide as appropriate  - Encourage maximum independence but intervene and supervise when necessary  - Involve family in performance of ADLs  - Assess for home care needs following discharge   - Consider OT consult to assist with ADL evaluation and planning for discharge  - Provide patient education as appropriate  Outcome: Progressing  Goal: Maintain or return mobility status to optimal level  Description  INTERVENTIONS:  - Assess patient's baseline mobility status (ambulation, transfers, stairs, etc )    - Identify cognitive and physical deficits and behaviors that affect mobility  - Identify mobility aids required to assist with transfers and/or ambulation (gait belt, sit-to-stand, lift, walker, cane, etc )  - [place] fall precautions as indicated by assessment  - Record patient progress and toleration of activity level on Mobility SBAR; progress patient to next Phase/Stage  - Instruct patient to call for assistance with activity based on assessment  - Consider rehabilitation consult to assist with strengthening/weightbearing, etc   Outcome: Progressing     Problem: DISCHARGE PLANNING  Goal: Discharge to home or other facility with appropriate resources  Description  INTERVENTIONS:  - Identify barriers to discharge w/patient and caregiver  - Arrange for needed discharge resources and transportation as appropriate  - Identify discharge learning needs (meds, wound care, etc )  - Arrange for interpretive services to assist at discharge as needed  - Refer to Case Management Department for coordinating discharge planning if the patient needs post-hospital services based on physician/advanced practitioner order or complex needs related to functional status, cognitive ability, or social support system  Outcome: Progressing     Problem: Knowledge Deficit  Goal: Patient/family/caregiver demonstrates understanding of disease process, treatment plan, medications, and discharge instructions  Description  Complete learning assessment and assess knowledge base  Interventions:  - Provide teaching at level of understanding  - Provide teaching via preferred learning methods  Outcome: Progressing     Problem: Nutrition/Hydration-ADULT  Goal: Nutrient/Hydration intake appropriate for improving, restoring or maintaining nutritional needs  Description  Monitor and assess patient's nutrition/hydration status for malnutrition  Collaborate with interdisciplinary team and initiate plan and interventions as ordered  Monitor patient's weight and dietary intake as ordered or per policy  Utilize nutrition screening tool and intervene as necessary  Determine patient's food preferences and provide high-protein, high-caloric foods as appropriate       INTERVENTIONS:  - Monitor oral intake, urinary output, labs, and treatment plans  - Assess nutrition and hydration status and recommend course of action  - Evaluate amount of meals eaten  - Assist patient with eating if necessary   - Allow adequate time for meals  - Recommend/ encourage appropriate diets, oral nutritional supplements, and vitamin/mineral supplements  - Order, calculate, and assess calorie counts as needed  - Recommend, monitor, and adjust tube feedings and TPN/PPN based on assessed needs  - Assess need for intravenous fluids  - Provide specific nutrition/hydration education as appropriate  - Include patient/family/caregiver in decisions related to nutrition  Outcome: Progressing     Problem: Prexisting or High Potential for Compromised Skin Integrity  Goal: Skin integrity is maintained or improved  Description  INTERVENTIONS:  - Identify patients at risk for skin breakdown  - Assess and monitor skin integrity  - Assess and monitor nutrition and hydration status  - Monitor labs   - Assess for incontinence   - Turn and reposition patient  - Assist with mobility/ambulation  - Relieve pressure over bony prominences  - Avoid friction and shearing  - Provide appropriate hygiene as needed including keeping skin clean and dry  - Evaluate need for skin moisturizer/barrier cream  - Collaborate with interdisciplinary team   - Patient/family teaching  - Consider wound care consult   Outcome: Progressing

## 2019-10-08 NOTE — NURSING NOTE
Patient to be DC home today, IV removed, dc instructions reviewed and given to patient  Patient instructed to continue to take all med's as prescribed and to follow up with all MD appointment's, pt verbalized understanding

## 2019-10-08 NOTE — PLAN OF CARE
Problem: PHYSICAL THERAPY ADULT  Goal: Performs mobility at highest level of function for planned discharge setting  See evaluation for individualized goals  Description  Treatment/Interventions: Functional transfer training, LE strengthening/ROM, Therapeutic exercise, Endurance training, Patient/family training, Equipment eval/education, Bed mobility, Gait training, Compensatory technique education, Spoke to nursing, OT     See flowsheet documentation for full assessment, interventions and recommendations  Rachael Whittaker PT     Outcome: Progressing  Note:   Prognosis: Good  Problem List: Decreased strength, Decreased endurance, Impaired balance, Decreased mobility, Impaired judgement, Decreased safety awareness, Pain  Assessment: Pt  found resting in bed but was agreeable to participate with some encouragement  Pt  performed bed mobility tasks including rolling from side to side with MI, using BSR's for assitance and increased time needed due to reports of 7/10 pain level in her back with sharp shooting pain  Pt  then transfered from supine to sit with MI, with HOB elevated and grabbing for the BSR to assist patient to the EOB  Pt  then transfered from sit to stand with supervision and ambulated with no AD for 15 ft which was as far as her O2 tubing would allow  Pt  was then agreeable to sit OOB in stationary chair where she performed 25 reps of active ther ex as per treatment flow sheet  Pt  did tolerate treatment well but was adement throughout treatment and very vocal to her family and  that she was going home today and was not accepting of any service or help  Pt  was on 5 LO2 during treatment and very vocal without any signs of SOB    Pt  was educated on the benefit of home care and continued therapy to help improve her functional activity tolrerance to help improve her functional mobility to prevent frequent returns to the hospital   Pt  also eduacted on med alerts for safety which she was not willing to accept as well as water proof bags that can hold her cell phone so she can keep it with her at all time, even in the shower, for peace of mind  Pt  was left with call needs in reach with her family and  attempting to educater her on  services that would be beneficail for her  Continue current POC and progress as tolerated  Recommendation: Home with family support, Home PT     PT - OK to Discharge: Yes    See flowsheet documentation for full assessment     Juliocesar Batista, PTA

## 2019-10-08 NOTE — PLAN OF CARE
Problem: Potential for Falls  Goal: Patient will remain free of falls  Description  INTERVENTIONS:  - Assess patient frequently for physical needs  -  Identify cognitive and physical deficits and behaviors that affect risk of falls    -  [place] fall precautions as indicated by assessment   - Educate patient/family on patient safety including physical limitations  - Instruct patient to call for assistance with activity based on assessment  - Modify environment to reduce risk of injury  - Consider OT/PT consult to assist with strengthening/mobility  Outcome: Progressing

## 2019-10-08 NOTE — OCCUPATIONAL THERAPY NOTE
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"   Pain Assessment   Pain Assessment 0-10   Pain Score 7   Pain Type Chronic pain   Pain Location Back   ADL   Where Assessed Chair  (patient declined self-care at this time Except below)   Grooming Comments patient combed hair   Therapeutic Excerise-Strength   UE Strength Yes   Right Upper Extremity- Strength   R Shoulder Flexion; Horizontal ABduction; Other (Comment)  (pro/re-traction)   R Elbow Elbow flexion;Elbow extension  (Plus: supin/pron-ation)   R Weight/Reps/Sets 1 pound - 30 reps, 1 set each   Left Upper Extremity-Strength   L Weights/Reps/Sets All exers  Same as RUE above   Coordination   Gross Motor WFL  (reports crepitus of R shoulder)   Dexterity WFL   Cognition   Overall Cognitive Status WFL   Arousal/Participation Alert; Cooperative   Activity Tolerance   Activity Tolerance Patient tolerated treatment well  (minor fatigue noted )   Assessment   Assessment Patient participated in Skilled OT session this date with interventions consisting of Energy Conservation techniques and therapeutic exercise to: increase functional use of BUEs, increase BUE muscle strength and increase activity tolerance for all ADL participation   Patient agreeable to OT treatment session, upon arrival patient was found seated OOB to Chair  She is eager to go home  Patient requiring occasional rest periods  Patient continues to be functioning below baseline level, occupational performance remains limited secondary to factors listed above and increased risk for falls and injury  From OT standpoint, recommendation at time of d/c would be Home with family support and Home OT as warranted     Patient to benefit from continued Occupational Therapy treatment while in the hospital to address deficits as defined above and maximize level of functional independence with ADLs and functional mobility  Plan   Treatment Interventions UE strengthening/ROM; Energy conservation   Goal Expiration Date 10/17/19   OT Treatment Day [address]/L

## 2019-10-08 NOTE — PLAN OF CARE
Problem: Potential for Falls  Goal: Patient will remain free of falls  Description  INTERVENTIONS:  - Assess patient frequently for physical needs  -  Identify cognitive and physical deficits and behaviors that affect risk of falls    -  [place] fall precautions as indicated by assessment   - Educate patient/family on patient safety including physical limitations  - Instruct patient to call for assistance with activity based on assessment  - Modify environment to reduce risk of injury  - Consider OT/PT consult to assist with strengthening/mobility  10/8/2019 1244 by Kye Hicks RN  Outcome: Adequate for Discharge  10/8/2019 0820 by Kye Hicks RN  Outcome: Progressing     Problem: PAIN - ADULT  Goal: Verbalizes/displays adequate comfort level or baseline comfort level  Description  Interventions:  - Encourage patient to monitor pain and request assistance  - Assess pain using appropriate pain scale  - Administer analgesics based on type and severity of pain and evaluate response  - Implement non-pharmacological measures as appropriate and evaluate response  - Consider cultural and social influences on pain and pain management  - Notify physician/advanced practitioner if interventions unsuccessful or patient reports new pain  10/8/2019 1244 by Kye Hicks RN  Outcome: Adequate for Discharge  10/8/2019 0820 by Kye Hicks RN  Outcome: Progressing     Problem: INFECTION - ADULT  Goal: Absence or prevention of progression during hospitalization  Description  INTERVENTIONS:  - Assess and monitor for signs and symptoms of infection  - Monitor lab/diagnostic results  - Monitor all insertion sites, i e  indwelling lines, tubes, and drains  - Monitor endotracheal if appropriate and nasal secretions for changes in amount and color  - [place] appropriate cooling/warming therapies per order  - Administer medications as ordered  - Instruct and encourage patient and family to use good hand hygiene technique  - Identify and instruct in appropriate isolation precautions for identified infection/condition  10/8/2019 1244 by Fernando Barahona RN  Outcome: Adequate for Discharge  10/8/2019 0820 by Fernando Barahona RN  Outcome: Progressing     Problem: SAFETY ADULT  Goal: Maintain or return to baseline ADL function  Description  INTERVENTIONS:  -  Assess patient's ability to carry out ADLs; assess patient's baseline for ADL function and identify physical deficits which impact ability to perform ADLs (bathing, care of mouth/teeth, toileting, grooming, dressing, etc )  - Assess/evaluate cause of self-care deficits   - Assess range of motion  - Assess patient's mobility; develop plan if impaired  - Assess patient's need for assistive devices and provide as appropriate  - Encourage maximum independence but intervene and supervise when necessary  - Involve family in performance of ADLs  - Assess for home care needs following discharge   - Consider OT consult to assist with ADL evaluation and planning for discharge  - Provide patient education as appropriate  10/8/2019 1244 by Fernando Barahona RN  Outcome: Adequate for Discharge  10/8/2019 0820 by Fernando Barahona RN  Outcome: Progressing  Goal: Maintain or return mobility status to optimal level  Description  INTERVENTIONS:  - Assess patient's baseline mobility status (ambulation, transfers, stairs, etc )    - Identify cognitive and physical deficits and behaviors that affect mobility  - Identify mobility aids required to assist with transfers and/or ambulation (gait belt, sit-to-stand, lift, walker, cane, etc )  - [place] fall precautions as indicated by assessment  - Record patient progress and toleration of activity level on Mobility SBAR; progress patient to next Phase/Stage  - Instruct patient to call for assistance with activity based on assessment  - Consider rehabilitation consult to assist with strengthening/weightbearing, etc   10/8/2019 1244 by Tanvir ECKERT Roland Buckner RN  Outcome: Adequate for Discharge  10/8/2019 0820 by Kathy Rutherford RN  Outcome: Progressing     Problem: DISCHARGE PLANNING  Goal: Discharge to home or other facility with appropriate resources  Description  INTERVENTIONS:  - Identify barriers to discharge w/patient and caregiver  - Arrange for needed discharge resources and transportation as appropriate  - Identify discharge learning needs (meds, wound care, etc )  - Arrange for interpretive services to assist at discharge as needed  - Refer to Case Management Department for coordinating discharge planning if the patient needs post-hospital services based on physician/advanced practitioner order or complex needs related to functional status, cognitive ability, or social support system  10/8/2019 1244 by Kathy Rutherford RN  Outcome: Adequate for Discharge  10/8/2019 0820 by Kathy Rutherford RN  Outcome: Progressing     Problem: Knowledge Deficit  Goal: Patient/family/caregiver demonstrates understanding of disease process, treatment plan, medications, and discharge instructions  Description  Complete learning assessment and assess knowledge base  Interventions:  - Provide teaching at level of understanding  - Provide teaching via preferred learning methods  10/8/2019 1244 by Kathy Rutherford RN  Outcome: Adequate for Discharge  10/8/2019 0820 by Kathy Rutherford RN  Outcome: Progressing     Problem: Nutrition/Hydration-ADULT  Goal: Nutrient/Hydration intake appropriate for improving, restoring or maintaining nutritional needs  Description  Monitor and assess patient's nutrition/hydration status for malnutrition  Collaborate with interdisciplinary team and initiate plan and interventions as ordered  Monitor patient's weight and dietary intake as ordered or per policy  Utilize nutrition screening tool and intervene as necessary  Determine patient's food preferences and provide high-protein, high-caloric foods as appropriate       INTERVENTIONS:  - Monitor oral intake, urinary output, labs, and treatment plans  - Assess nutrition and hydration status and recommend course of action  - Evaluate amount of meals eaten  - Assist patient with eating if necessary   - Allow adequate time for meals  - Recommend/ encourage appropriate diets, oral nutritional supplements, and vitamin/mineral supplements  - Order, calculate, and assess calorie counts as needed  - Recommend, monitor, and adjust tube feedings and TPN/PPN based on assessed needs  - Assess need for intravenous fluids  - Provide specific nutrition/hydration education as appropriate  - Include patient/family/caregiver in decisions related to nutrition  10/8/2019 1244 by Heidy Gonzalez RN  Outcome: Adequate for Discharge  10/8/2019 0820 by Heidy Gonzalez RN  Outcome: Progressing     Problem: Prexisting or High Potential for Compromised Skin Integrity  Goal: Skin integrity is maintained or improved  Description  INTERVENTIONS:  - Identify patients at risk for skin breakdown  - Assess and monitor skin integrity  - Assess and monitor nutrition and hydration status  - Monitor labs   - Assess for incontinence   - Turn and reposition patient  - Assist with mobility/ambulation  - Relieve pressure over bony prominences  - Avoid friction and shearing  - Provide appropriate hygiene as needed including keeping skin clean and dry  - Evaluate need for skin moisturizer/barrier cream  - Collaborate with interdisciplinary team   - Patient/family teaching  - Consider wound care consult   10/8/2019 1244 by Heidy Gonzalez RN  Outcome: Adequate for Discharge  10/8/2019 0820 by Heidy Gonzalez RN  Outcome: Progressing

## 2019-10-08 NOTE — NURSING NOTE
SLEEPING PERIODICALLY THROUGH OUT SHIFT  DENIES NAY C/O OF CHEST PAINS  NO TELE MONITORING IS ORDERED POST ICU  02 IS MAINTAINED 5 LITERS N/C  NO RESP  DISTRESS IS NOTED  PT IS VOIDING FREELY ,POST GERBER D/CD  10/7/19  NO C/O ARE VOICED  CALL CASIANO IS WITHIN REACH

## 2019-10-08 NOTE — DISCHARGE SUMMARY
Discharge- Sukumar Marie 1935, 82 y o  female MRN: [id number]    Unit/Bed#: -02 Encounter: [id number]    Primary Care Provider: Alec Donovan MD   Date and time admitted to hospital: 10/6/2019  5:27 AM        Chronic respiratory failure with hypoxia ([facility] [address] )  Assessment & Plan  · Continue home oxygen regimen    Left elbow pain  Assessment & Plan  · Secondary to olecranon bursitis, improving  · Appreciate Orthopedic input  · Follow up as an outpatient as per Orthopedic recommendations    Lumbar radiculopathy  Assessment & Plan  · Pain control as needed  · Follow up with Orthopedic surgery    COPD (chronic obstructive pulmonary disease) ([facility] )  Assessment & Plan  · Stable, no signs of acute exacerbation  · Continue oxygen and nebulizers as needed    * Atrial fibrillation with rapid ventricular response (HCC)  Assessment & Plan  · Converted to sinus rhythm, now on p o   Cardizem  · I called relatively unremarkable  · Appreciate Cardiology input  · Will discharge patient on Eliquis and long-acting Cardizem        Discharging Physician / Practitioner: Ashley Matute MD  PCP: Alec Donovan MD  Admission Date:   Admission Orders (From admission, onward)     Ordered        10/06/19 0815  Inpatient Admission (expected length of stay for this patient Order details is greater than two midnights)  Once                   Discharge Date: 10/08/19    Resolved Problems  Date Reviewed: 10/8/2019    None          Consultations During Hospital Stay:  · Ortho  · Cardiology    Procedures Performed:   · n/a    Significant Findings / Test Results:   · n/a    Incidental Findings:   · n/a     Test Results Pending at Discharge (will require follow up):   · none     Outpatient Tests Requested:  · none    Complications:     none    Reason for Admission:  Elbow pain    Hospital Course:     Sukumar Marie is a 82 y o  female patient who originally presented to the hospital on 10/6/2019 due to left elbow pain due to olecranon bursitis and incidentally found to be in atrial fibrillation with rapid ventricular rate  Patient was admitted to the hospital and treated with IV Cardizem  She subsequently converted to normal sinus rhythm  Patient was started on Eliquis and p o  Cardizem long-acting  She had an echocardiogram which was relatively unremarkable aside from grade 1 diastolic dysfunction  She was deemed stable for discharge by Cardiology  Terms of her left elbow pain she was seen in consultation by Orthopedic surgery who recommended supportive care, and outpatient follow-up  Patient was subsequently deemed stable for discharge home  Please see above list of diagnoses and related plan for additional information  Condition at Discharge: fair     Discharge Day Visit / Exam:     Subjective:  Patient seen examined  No acute events were noted  Patient was go home  Vitals: Blood Pressure: 130/65 (10/08/19 0736)  Pulse: 84 (10/08/19 0736)  Temperature: 98 1 °F (36 7 °C) (10/08/19 0736)  Temp Source: Temporal (10/08/19 0736)  Respirations: 18 (10/08/19 0736)  Height: 5' (152 4 cm) (10/06/19 0845)  Weight - Scale: 74 7 kg (164 lb 10 9 oz) (10/08/19 0600)  SpO2: 98 %(4 5L/M) (10/08/19 0740)  Exam:   Physical Exam   Constitutional: She is oriented to person, place, and time  She appears well-developed and well-nourished  HENT:   Head: Normocephalic and atraumatic  Eyes: Pupils are equal, round, and reactive to light  EOM are normal    Cardiovascular: Normal rate and regular rhythm  Pulmonary/Chest: Effort normal and breath sounds normal    Coarse breath sounds bilaterally, poor air movement   Abdominal: Soft  Bowel sounds are normal  She exhibits no distension  Musculoskeletal: Normal range of motion  She exhibits no edema  Left elbow olecranon bursitis   Neurological: She is alert and oriented to person, place, and time  Skin: Skin is warm  Capillary refill takes less than 2 seconds     Psychiatric: She has a normal mood and affect  Her behavior is normal  Thought content normal    Nursing note and vitals reviewed  Discussion with Family: n/a    Discharge instructions/Information to patient and family:   See after visit summary for information provided to patient and family  Provisions for Follow-Up Care:  See after visit summary for information related to follow-up care and any pertinent home health orders  Disposition:     Home    For Discharges to [facility] SNF:   · Not Applicable to this Patient - Not Applicable to this Patient    Planned Readmission:    n/a     Discharge Statement:  I spent 35 minutes discharging the patient  This time was spent on the day of discharge  I had direct contact with the patient on the day of discharge  Greater than 50% of the total time was spent examining patient, answering all patient questions, arranging and discussing plan of care with patient as well as directly providing post-discharge instructions  Additional time then spent on discharge activities  Discharge Medications:  See after visit summary for reconciled discharge medications provided to patient and family        ** Please Note: This note has been constructed using a voice recognition system **

## 2019-10-08 NOTE — DISCHARGE INSTRUCTIONS
Apixaban (By mouth)   Apixaban (a-PIX-a-ban)  Treats and prevents blood clots  This medicine is a blood thinner  Brand Name(s): Eliquis   There may be other brand names for this medicine  When This Medicine Should Not Be Used: This medicine is not right for everyone  Do not use it if you had an allergic reaction to apixaban or you have active bleeding  How to Use This Medicine:   Tablet  · Your doctor will tell you how much medicine to use  Do not use more than directed  · If you are not able to swallow the tablets whole, they may be crushed and mixed in water, 5% dextrose in water (D5W), apple juice, or applesauce  The crushed tablets may be mixed with 60 mL of water or D5W dose and given through a nasogastric tube (NGT)  · This medicine should come with a Medication Guide  Ask your pharmacist for a copy if you do not have one  · Missed dose: Take a dose as soon as you remember  If it is almost time for your next dose, wait until then and take a regular dose  Do not take extra medicine to make up for a missed dose  · Store the medicine in a closed container at room temperature, away from heat, moisture, and direct light  Drugs and Foods to Avoid:   Ask your doctor or pharmacist before using any other medicine, including over-the-counter medicines, vitamins, and herbal products  · Some medicines can affect how apixaban works  Tell your doctor if you are using any of the following:   ¨ Carbamazepine, clarithromycin, itraconazole, ketoconazole, phenytoin, rifampin, ritonavir, Jaz's wort  ¨ Blood thinner (including clopidogrel, heparin, prasugrel, warfarin)  ¨ Medicine to treat depression  ¨ NSAID pain or arthritis medicine (including aspirin, celecoxib, diclofenac, ibuprofen, naproxen)  Warnings While Using This Medicine:   · Tell your doctor if you are pregnant or breastfeeding, or if you have kidney disease, liver disease, bleeding problems, or an artificial heart valve    · Do not stop using this medicine suddenly without asking your doctor  You might have a higher risk of stroke for a short time after you stop using this medicine  · This medicine increases your risk for bleeding that can become serious if not controlled  You may also bruise easily, and it may take longer than usual for bleeding to stop  · This medicine may increase your risk for blood clots in your spine or back if you undergo an epidural or spinal puncture  This could lead to paralysis  Tell your doctor if you ever had spine problems or back surgery  · Tell any doctor or dentist who treats you that you are using this medicine  With your doctor's supervision, you may need to stop using this medicine several days before you have surgery or medical tests  · Your doctor will do lab tests at regular visits to check on the effects of this medicine  Keep all appointments  · Keep all medicine out of the reach of children  Never share your medicine with anyone  Possible Side Effects While Using This Medicine:   Call your doctor right away if you notice any of these side effects:  · Allergic reaction: Itching or hives, swelling in your face or hands, swelling or tingling in your mouth or throat, chest tightness, trouble breathing  · Change in how much or how often you urinate, red or pink urine  · Chest pain, trouble breathing  · Coughing up blood, vomiting blood or material that looks like coffee grounds  · Numbness, tingling, or muscle weakness in your legs or feet  · Red or black, tarry stools  · Unusual bleeding, bruising, or weakness  If you notice other side effects that you think are caused by this medicine, tell your doctor  Call your doctor for medical advice about side effects  You may report side effects to FDA at 8-981-FDA-5177  © 2017 [employer] Information is for End User's use only and may not be sold, redistributed or otherwise used for commercial purposes  The above information is an  only  It is not intended as medical advice for individual conditions or treatments  Talk to your doctor, nurse or pharmacist before following any medical regimen to see if it is safe and effective for you

## 2019-10-09 ENCOUNTER — APPOINTMENT (EMERGENCY)
Dept: RADIOLOGY | Facility: HOSPITAL | Age: 84
DRG: 309 | End: 2019-10-09
Payer: MEDICARE

## 2019-10-09 ENCOUNTER — HOSPITAL ENCOUNTER (OUTPATIENT)
Facility: HOSPITAL | Age: 84
Setting detail: OBSERVATION
Discharge: HOME HEALTH | DRG: 309 | End: 2019-10-10
Attending: INTERNAL MEDICINE | Admitting: INTERNAL MEDICINE
Payer: MEDICARE

## 2019-10-09 DIAGNOSIS — Z91.14 NON COMPLIANCE W MEDICATION REGIMEN: ICD-10-CM

## 2019-10-09 DIAGNOSIS — F41.9 CHRONIC ANXIETY: ICD-10-CM

## 2019-10-09 DIAGNOSIS — J44.1 COPD WITH ACUTE EXACERBATION (HCC): Primary | ICD-10-CM

## 2019-10-09 PROBLEM — I48.20 CHRONIC ATRIAL FIBRILLATION (HCC): Status: ACTIVE | Noted: 2019-10-06

## 2019-10-09 LAB
ALBUMIN SERPL BCP-MCNC: 3.3 G/DL (ref 3.5–5.7)
ALP SERPL-CCNC: 72 U/L (ref 55–165)
ALT SERPL W P-5'-P-CCNC: 25 U/L (ref 7–52)
ANION GAP SERPL CALCULATED.3IONS-SCNC: 5 MMOL/L (ref 4–13)
AST SERPL W P-5'-P-CCNC: 15 U/L (ref 13–39)
BACTERIA UR QL AUTO: ABNORMAL /HPF
BASOPHILS # BLD AUTO: 0 THOUSANDS/ΜL (ref 0–0.1)
BASOPHILS NFR BLD AUTO: 0 % (ref 0–2)
BILIRUB SERPL-MCNC: 0.4 MG/DL (ref 0.2–1)
BILIRUB UR QL STRIP: NEGATIVE
BNP SERPL-MCNC: 71 PG/ML (ref 1–100)
BUN SERPL-MCNC: 12 MG/DL (ref 7–25)
CALCIUM SERPL-MCNC: 8.7 MG/DL (ref 8.6–10.5)
CHLORIDE SERPL-SCNC: 97 MMOL/L (ref 98–107)
CLARITY UR: ABNORMAL
CO2 SERPL-SCNC: 30 MMOL/L (ref 21–31)
COLOR UR: YELLOW
CREAT SERPL-MCNC: 0.51 MG/DL (ref 0.6–1.2)
CRP SERPL HS-MCNC: >90 MG/L
EOSINOPHIL # BLD AUTO: 0.1 THOUSAND/ΜL (ref 0–0.61)
EOSINOPHIL NFR BLD AUTO: 1 % (ref 0–5)
ERYTHROCYTE [DISTWIDTH] IN BLOOD BY AUTOMATED COUNT: 14 % (ref 11.5–14.5)
GFR SERPL CREATININE-BSD FRML MDRD: 90 ML/MIN/1.73SQ M
GLUCOSE SERPL-MCNC: 154 MG/DL (ref 65–99)
GLUCOSE UR STRIP-MCNC: ABNORMAL MG/DL
HCT VFR BLD AUTO: 38 % (ref 42–47)
HGB BLD-MCNC: 12.7 G/DL (ref 12–16)
HGB UR QL STRIP.AUTO: ABNORMAL
KETONES UR STRIP-MCNC: ABNORMAL MG/DL
LEUKOCYTE ESTERASE UR QL STRIP: NEGATIVE
LYMPHOCYTES # BLD AUTO: 0.7 THOUSANDS/ΜL (ref 0.6–4.47)
LYMPHOCYTES NFR BLD AUTO: 6 % (ref 21–51)
MCH RBC QN AUTO: 29 PG (ref 26–34)
MCHC RBC AUTO-ENTMCNC: 33.3 G/DL (ref 31–37)
MCV RBC AUTO: 87 FL (ref 81–99)
MONOCYTES # BLD AUTO: 0.8 THOUSAND/ΜL (ref 0.17–1.22)
MONOCYTES NFR BLD AUTO: 7 % (ref 2–12)
NEUTROPHILS # BLD AUTO: 9.6 THOUSANDS/ΜL (ref 1.4–6.5)
NEUTS SEG NFR BLD AUTO: 86 % (ref 42–75)
NITRITE UR QL STRIP: NEGATIVE
NON-SQ EPI CELLS URNS QL MICRO: ABNORMAL /HPF
PH UR STRIP.AUTO: 6 [PH]
PLATELET # BLD AUTO: 198 THOUSANDS/UL (ref 149–390)
PMV BLD AUTO: 7.5 FL (ref 8.6–11.7)
POTASSIUM SERPL-SCNC: 4.4 MMOL/L (ref 3.5–5.5)
PROCALCITONIN SERPL-MCNC: 0.07 NG/ML
PROT SERPL-MCNC: 6.3 G/DL (ref 6.4–8.9)
PROT UR STRIP-MCNC: NEGATIVE MG/DL
RBC # BLD AUTO: 4.37 MILLION/UL (ref 3.9–5.2)
RBC #/AREA URNS AUTO: ABNORMAL /HPF
SODIUM SERPL-SCNC: 132 MMOL/L (ref 134–143)
SP GR UR STRIP.AUTO: <=1.005 (ref 1–1.03)
UROBILINOGEN UR QL STRIP.AUTO: 0.2 E.U./DL
WBC # BLD AUTO: 11.2 THOUSAND/UL (ref 4.8–10.8)
WBC #/AREA URNS AUTO: ABNORMAL /HPF

## 2019-10-09 PROCEDURE — 94640 AIRWAY INHALATION TREATMENT: CPT

## 2019-10-09 PROCEDURE — 83880 ASSAY OF NATRIURETIC PEPTIDE: CPT | Performed by: INTERNAL MEDICINE

## 2019-10-09 PROCEDURE — 81001 URINALYSIS AUTO W/SCOPE: CPT | Performed by: INTERNAL MEDICINE

## 2019-10-09 PROCEDURE — 80053 COMPREHEN METABOLIC PANEL: CPT | Performed by: INTERNAL MEDICINE

## 2019-10-09 PROCEDURE — 87205 SMEAR GRAM STAIN: CPT | Performed by: INTERNAL MEDICINE

## 2019-10-09 PROCEDURE — 99219 PR INITIAL OBSERVATION CARE/DAY 50 MINUTES: CPT | Performed by: INTERNAL MEDICINE

## 2019-10-09 PROCEDURE — 97167 OT EVAL HIGH COMPLEX 60 MIN: CPT

## 2019-10-09 PROCEDURE — 86141 C-REACTIVE PROTEIN HS: CPT | Performed by: INTERNAL MEDICINE

## 2019-10-09 PROCEDURE — G8988 SELF CARE GOAL STATUS: HCPCS

## 2019-10-09 PROCEDURE — 94760 N-INVAS EAR/PLS OXIMETRY 1: CPT

## 2019-10-09 PROCEDURE — 87185 SC STD ENZYME DETCJ PER NZM: CPT | Performed by: INTERNAL MEDICINE

## 2019-10-09 PROCEDURE — 84145 PROCALCITONIN (PCT): CPT | Performed by: INTERNAL MEDICINE

## 2019-10-09 PROCEDURE — 96374 THER/PROPH/DIAG INJ IV PUSH: CPT

## 2019-10-09 PROCEDURE — 36415 COLL VENOUS BLD VENIPUNCTURE: CPT | Performed by: INTERNAL MEDICINE

## 2019-10-09 PROCEDURE — 87147 CULTURE TYPE IMMUNOLOGIC: CPT | Performed by: INTERNAL MEDICINE

## 2019-10-09 PROCEDURE — 87070 CULTURE OTHR SPECIMN AEROBIC: CPT | Performed by: INTERNAL MEDICINE

## 2019-10-09 PROCEDURE — 99285 EMERGENCY DEPT VISIT HI MDM: CPT

## 2019-10-09 PROCEDURE — 93005 ELECTROCARDIOGRAM TRACING: CPT

## 2019-10-09 PROCEDURE — 87186 SC STD MICRODIL/AGAR DIL: CPT | Performed by: INTERNAL MEDICINE

## 2019-10-09 PROCEDURE — 85025 COMPLETE CBC W/AUTO DIFF WBC: CPT | Performed by: INTERNAL MEDICINE

## 2019-10-09 PROCEDURE — 71045 X-RAY EXAM CHEST 1 VIEW: CPT

## 2019-10-09 PROCEDURE — 97530 THERAPEUTIC ACTIVITIES: CPT

## 2019-10-09 PROCEDURE — 97163 PT EVAL HIGH COMPLEX 45 MIN: CPT

## 2019-10-09 PROCEDURE — G8987 SELF CARE CURRENT STATUS: HCPCS

## 2019-10-09 PROCEDURE — G8979 MOBILITY GOAL STATUS: HCPCS

## 2019-10-09 PROCEDURE — 87077 CULTURE AEROBIC IDENTIFY: CPT | Performed by: INTERNAL MEDICINE

## 2019-10-09 PROCEDURE — G8978 MOBILITY CURRENT STATUS: HCPCS

## 2019-10-09 PROCEDURE — 87184 SC STD DISK METHOD PER PLATE: CPT | Performed by: INTERNAL MEDICINE

## 2019-10-09 RX ORDER — DILTIAZEM HYDROCHLORIDE 180 MG/1
180 CAPSULE, COATED, EXTENDED RELEASE ORAL DAILY
Status: DISCONTINUED | OUTPATIENT
Start: 2019-10-09 | End: 2019-10-10 | Stop reason: HOSPADM

## 2019-10-09 RX ORDER — IPRATROPIUM BROMIDE AND ALBUTEROL SULFATE 2.5; .5 MG/3ML; MG/3ML
3 SOLUTION RESPIRATORY (INHALATION)
Status: DISCONTINUED | OUTPATIENT
Start: 2019-10-09 | End: 2019-10-09

## 2019-10-09 RX ORDER — IPRATROPIUM BROMIDE AND ALBUTEROL SULFATE 2.5; .5 MG/3ML; MG/3ML
3 SOLUTION RESPIRATORY (INHALATION)
Status: DISCONTINUED | OUTPATIENT
Start: 2019-10-10 | End: 2019-10-10 | Stop reason: HOSPADM

## 2019-10-09 RX ORDER — PANTOPRAZOLE SODIUM 40 MG/1
40 TABLET, DELAYED RELEASE ORAL
Status: DISCONTINUED | OUTPATIENT
Start: 2019-10-09 | End: 2019-10-10 | Stop reason: HOSPADM

## 2019-10-09 RX ORDER — METHYLPREDNISOLONE SODIUM SUCCINATE 125 MG/2ML
125 INJECTION, POWDER, LYOPHILIZED, FOR SOLUTION INTRAMUSCULAR; INTRAVENOUS ONCE
Status: COMPLETED | OUTPATIENT
Start: 2019-10-09 | End: 2019-10-09

## 2019-10-09 RX ORDER — METHYLPREDNISOLONE SODIUM SUCCINATE 40 MG/ML
40 INJECTION, POWDER, LYOPHILIZED, FOR SOLUTION INTRAMUSCULAR; INTRAVENOUS EVERY 12 HOURS SCHEDULED
Status: DISCONTINUED | OUTPATIENT
Start: 2019-10-09 | End: 2019-10-10

## 2019-10-09 RX ORDER — GUAIFENESIN 600 MG
600 TABLET, EXTENDED RELEASE 12 HR ORAL 2 TIMES DAILY
Status: DISCONTINUED | OUTPATIENT
Start: 2019-10-09 | End: 2019-10-10 | Stop reason: HOSPADM

## 2019-10-09 RX ORDER — MONTELUKAST SODIUM 10 MG/1
10 TABLET ORAL
Status: DISCONTINUED | OUTPATIENT
Start: 2019-10-09 | End: 2019-10-10 | Stop reason: HOSPADM

## 2019-10-09 RX ORDER — ACETAMINOPHEN 325 MG/1
650 TABLET ORAL EVERY 6 HOURS PRN
Status: DISCONTINUED | OUTPATIENT
Start: 2019-10-09 | End: 2019-10-10 | Stop reason: HOSPADM

## 2019-10-09 RX ORDER — ALBUTEROL SULFATE 2.5 MG/3ML
2.5 SOLUTION RESPIRATORY (INHALATION) ONCE
Status: COMPLETED | OUTPATIENT
Start: 2019-10-09 | End: 2019-10-09

## 2019-10-09 RX ORDER — FUROSEMIDE 20 MG/1
20 TABLET ORAL DAILY
Status: DISCONTINUED | OUTPATIENT
Start: 2019-10-10 | End: 2019-10-10 | Stop reason: HOSPADM

## 2019-10-09 RX ORDER — AZITHROMYCIN 250 MG/1
500 TABLET, FILM COATED ORAL EVERY 24 HOURS
Status: DISCONTINUED | OUTPATIENT
Start: 2019-10-09 | End: 2019-10-10 | Stop reason: HOSPADM

## 2019-10-09 RX ORDER — ALPRAZOLAM 0.5 MG/1
0.5 TABLET ORAL
Status: DISCONTINUED | OUTPATIENT
Start: 2019-10-09 | End: 2019-10-10 | Stop reason: HOSPADM

## 2019-10-09 RX ADMIN — GUAIFENESIN 600 MG: 600 TABLET, EXTENDED RELEASE ORAL at 17:07

## 2019-10-09 RX ADMIN — GUAIFENESIN 600 MG: 600 TABLET, EXTENDED RELEASE ORAL at 09:42

## 2019-10-09 RX ADMIN — IPRATROPIUM BROMIDE AND ALBUTEROL SULFATE 3 ML: 2.5; .5 SOLUTION RESPIRATORY (INHALATION) at 21:01

## 2019-10-09 RX ADMIN — APIXABAN 5 MG: 5 TABLET, FILM COATED ORAL at 17:07

## 2019-10-09 RX ADMIN — PANTOPRAZOLE SODIUM 40 MG: 40 TABLET, DELAYED RELEASE ORAL at 09:42

## 2019-10-09 RX ADMIN — APIXABAN 5 MG: 5 TABLET, FILM COATED ORAL at 09:42

## 2019-10-09 RX ADMIN — DILTIAZEM HYDROCHLORIDE 180 MG: 180 CAPSULE, COATED, EXTENDED RELEASE ORAL at 09:42

## 2019-10-09 RX ADMIN — METHYLPREDNISOLONE SODIUM SUCCINATE 40 MG: 40 INJECTION, POWDER, FOR SOLUTION INTRAMUSCULAR; INTRAVENOUS at 22:27

## 2019-10-09 RX ADMIN — ACETAMINOPHEN 650 MG: 325 TABLET ORAL at 22:27

## 2019-10-09 RX ADMIN — IPRATROPIUM BROMIDE AND ALBUTEROL SULFATE 3 ML: 2.5; .5 SOLUTION RESPIRATORY (INHALATION) at 13:11

## 2019-10-09 RX ADMIN — MONTELUKAST SODIUM 10 MG: 10 TABLET, COATED ORAL at 22:28

## 2019-10-09 RX ADMIN — AZITHROMYCIN 500 MG: 250 TABLET, FILM COATED ORAL at 10:50

## 2019-10-09 RX ADMIN — ACETAMINOPHEN 650 MG: 325 TABLET ORAL at 09:42

## 2019-10-09 RX ADMIN — ALBUTEROL SULFATE 2.5 MG: 2.5 SOLUTION RESPIRATORY (INHALATION) at 07:03

## 2019-10-09 RX ADMIN — METHYLPREDNISOLONE SODIUM SUCCINATE 125 MG: 125 INJECTION, POWDER, FOR SOLUTION INTRAMUSCULAR; INTRAVENOUS at 07:40

## 2019-10-09 NOTE — UTILIZATION REVIEW
Initial Clinical Review    Admission: Date/Time/Statement: 10/9 @ 0743 OBSERVATION    Orders Placed This Encounter   Procedures    Place in Observation (expected length of stay for this patient is less than two midnights)     Standing Status:   Standing     Number of Occurrences:   1     Order Specific Question:   Admitting Physician     Answer:   Abbie De La Torre [[id number]]     Order Specific Question:   Level of Care     Answer:   Med Surg [[id number]]     ED Arrival Information     Expected Arrival Acuity Means of Arrival Escorted By Service Admission Type    - 10/9/2019 06:20 Urgent Walk-In Self General Medicine Urgent    Arrival Complaint    shortness of breath        Chief Complaint   Patient presents with    Shortness of Breath      pt states that all night she was feeling lousy and increasing SOB , discharged tuesday for shortness of breath r/t new onset afib with RVR     Assessment/Plan: 81 y/o female presents to ED from home with SOB, cough, weakness, persistent elbow pain, generalized myalgias  Hospitalization 10/6 for Afib with RVR, discharged yesterday  On exam, irregular rhythm, decreased breath sounds, wheezes    Admitted as observation  COPD with acute exacerbation ([facility] [address] )  Assessment & Plan  · COPD with acute exacerbation  · Steroids, nebulizers  · Continue home regimen of Singulair  Chronic respiratory failure with hypoxia (HCC)  Assessment & Plan  · On 5 L nasal cannula at home which we will continue      ED Triage Vitals [10/09/19 0625]   Temperature Pulse Respirations Blood Pressure SpO2   98 4 °F (36 9 °C) 88 (!) 30 113/58 96 %      Temp Source Heart Rate Source Patient Position - Orthostatic VS BP Location FiO2 (%)   Temporal Monitor Sitting Left arm --      Pain Score       9        Wt Readings from Last 1 Encounters:   10/09/19 77 3 kg (170 lb 6 7 oz)     Additional Vital Signs:   10/09/19 0826  100 6 °F (38 1 °C)Abnormal   94  22  109/55  94 %  Nasal cannula  Lying   10/09/19 0730    96  30Abnormal 140/69  93 %       10/09/19 0700    96  34Abnormal   123/57  96 %           Pertinent Labs/Diagnostic Test Results:   Results from last 7 days   Lab Units 10/09/19  0651 10/07/19  0502 10/06/19  0711 10/04/19  0527 10/03/19  0513   WBC Thousand/uL 11 20* 9 00 10 00 7 10 8 20   HEMOGLOBIN g/dL 12 7 12 5 14 1 12 5 13 1   HEMATOCRIT % 38 0* 39 1* 44 2 38 1* 40 5*   PLATELETS Thousands/uL 198 210 296 224 247   NEUTROS ABS Thousands/µL 9 60* 7 60* 7 50* 5 20 5 90         Results from last 7 days   Lab Units 10/09/19  0651 10/07/19  0502 10/06/19  0711 10/04/19  0527 10/03/19  0513   SODIUM mmol/L 132* 137 137 138 138   POTASSIUM mmol/L 4 4 4 1 3 7 4 1 3 6   CHLORIDE mmol/L 97* 103 96* 105 105   CO2 mmol/L 30 31 30 29 25   ANION GAP mmol/L 5 3* 11 4 8   BUN mg/dL 12 7 17 9 16   CREATININE mg/dL 0 51* 0 46* 0 57* 0 48* 0 57*   EGFR ml/min/1 73sq m 90 93 87 92 87   CALCIUM mg/dL 8 7 8 3* 9 1 8 0* 7 9*   MAGNESIUM mg/dL  --   --  1 9  --   --      Results from last 7 days   Lab Units 10/09/19  0651 10/06/19  0711 10/02/19  2152   AST U/L 15 16 12*   ALT U/L 25 32 24   ALK PHOS U/L 72 51* 50*   TOTAL PROTEIN g/dL 6 3* 6 3* 6 4   ALBUMIN g/dL 3 3* 3 5 3 8   TOTAL BILIRUBIN mg/dL 0 40 0 40 0 40         Results from last 7 days   Lab Units 10/09/19  0651 10/07/19  0502 10/06/19  0711 10/04/19  0527 10/03/19  0513 10/02/19  2152   GLUCOSE RANDOM mg/dL 154* 126* 130* 124* 122* 150*             No results found for: BETA-HYDROXYBUTYRATE                   Results from last 7 days   Lab Units 10/06/19  1448 10/06/19  1223 10/06/19  0711 10/02/19  2152   TROPONIN I ng/mL 0 03 0 03 <0 03 <0 03             Results from last 7 days   Lab Units 10/02/19  2152   TSH 3RD GENERATON uIU/mL 2 620     Results from last 7 days   Lab Units 10/07/19  0502 10/03/19  1725   PROCALCITONIN ng/ml 0 06 <0 05     Results from last 7 days   Lab Units 10/06/19  0711   LACTIC ACID mmol/L 1 5             Results from last 7 days   Lab Units 10/09/19  0651   BNP pg/mL 71             Results from last 7 days   Lab Units 10/02/19  2152   LIPASE u/L <10*             Results from last 7 days   Lab Units 10/06/19  0710 10/02/19  2236   CLARITY UA  Clear Clear   COLOR UA  Yellow Yellow   SPEC GRAV UA  1 010 <=1 005*   PH UA  6 0 5 5   GLUCOSE UA mg/dl Negative Negative   KETONES UA mg/dl Negative Negative   BLOOD UA  Trace-Intact* Negative   PROTEIN UA mg/dl Negative Negative   NITRITE UA  Negative Negative   BILIRUBIN UA  Negative Negative   UROBILINOGEN UA E U /dl 0 2 0 2   LEUKOCYTES UA  1+* Negative   WBC UA /hpf 2-4*  --    RBC UA /hpf 1-2*  --    BACTERIA UA /hpf Occasional  --    EPITHELIAL CELLS WET PREP /hpf Occasional  --                  Results from last 7 days   Lab Units 10/03/19  1026   C DIFF TOXIN B  NEGATIVE for C difficle toxin by PCR  Results from last 7 days   Lab Units 10/03/19  1026   SALMONELLA SP PCR  None Detected   SHIGELLA SP/ENTEROINVASIVE E  COLI (EIEC)  None Detected   CAMPYLOBACTER SP (JEJUNI AND COLI)  None Detected   SHIGA TOXIN 1/SHIGA TOXIN 2  None Detected         Results from last 7 days   Lab Units 10/06/19  0711   BLOOD CULTURE  No Growth at 48 hrs  No Growth at 48 hrs  CXR:  No acute cardiopulmonary disease    Emphysema, better demonstrated on CT      ED Treatment:   Medication Administration from 10/09/2019 0620 to 10/09/2019 0801       Date/Time Order Dose Route Action Action by Comments     10/09/2019 0703 albuterol inhalation solution 2 5 mg 2 5 mg Nebulization Given Radhika Del Toro, RT      10/09/2019 0740 methylPREDNISolone sodium succinate (Solu-MEDROL) injection 125 mg 125 mg Intravenous Given Isacc Cates, TATUM         Past Medical History:   Diagnosis Date    [facility]fib ([facility] )     Asthma     Blurred vision     Cardiac disease     [facility]     Diverticulosis     Emphysema lung ([facility] )      Present on Admission:   Chronic anxiety   Chronic atrial fibrillation   Chronic respiratory failure with hypoxia (HCC)   Chronic low back pain      Admitting Diagnosis: Shortness of breath [R06 02]  COPD with acute exacerbation (HCC) [J44 1]  Age/Sex: 82 y o  female  Admission Orders:    Current Facility-Administered Medications:  acetaminophen 650 mg Oral Q6H PRN Hayder Mora MD   ALPRAZolam 0 5 mg Oral HS PRN Hayder Mora MD   apixaban 5 mg Oral BID Hayder Mora MD   azithromycin 500 mg Oral Q24H Hayder Mora MD   diltiazem 180 mg Oral Daily Hayder Mora MD   [START ON 10/10/2019] furosemide 20 mg Oral Daily Hayder Mora MD   guaiFENesin 600 mg Oral BID Hayder Mora MD   ipratropium-albuterol 3 mL Nebulization Q6H Hayder Mora MD   methylPREDNISolone sodium succinate 40 mg Intravenous Q12H 300 Ajith Sanders MD   montelukast 10 mg Oral HS Hayder Mora MD   pantoprazole 40 mg Oral Early Morning Hayder Mora MD       None    Network Utilization Review Department  Phone: [phone]; Fax [phone]  [email or web address]  ATTENTION: Please call with any questions or concerns to [phone]  and carefully listen to the prompts so that you are directed to the right person  Send all requests for admission clinical reviews, approved or denied determinations and any other requests to fax [phone]   All voicemails are confidential

## 2019-10-09 NOTE — PHYSICAL THERAPY NOTE
Physical Therapy Evaluation     Patient's Name: Bobby Yip    Admitting Diagnosis  Shortness of breath [R06 02]  COPD with acute exacerbation ([place] [address] ) [J44 1]    Problem List  Patient Active Problem List   Diagnosis    Rectal bleed    Asthma    COPD with acute exacerbation ([place] [address] )    Cardiac disease    Diverticulosis of intestine with bleeding    Functional diarrhea    Colorectal polyps    Dyspnea    Effusion of bursa of left elbow    Cardiomegaly    Chronic anxiety    Chronic cystitis    Chronic low back pain    Congestive heart failure ([place] [address] )    Deviated nasal septum    Diverticulosis of colon    Gastroesophageal reflux disease without esophagitis    Gout    Herpes zoster    History of angioedema    History of colonic polyps    Lower gastrointestinal hemorrhage    Lumbar radiculopathy    Macular degeneration of both eyes    Obesity    Osteoporosis    Other specified forms of chronic ischemic heart disease    Seasonal allergies    Panic attack    Vocal cord dysfunction    Colitis presumed infectious    Chronic atrial fibrillation    Left elbow pain    Chronic respiratory failure with hypoxia (HCC)       Past Medical History  Past Medical History:   Diagnosis Date    A-fib ([place] [address] )     Asthma     Blurred vision     Cardiac disease     [facility]ch[facility] disease) [facility])     Diverticulosis     Emphysema lung ([place] [address] )        Past Surgical History  Past Surgical History:   Procedure Laterality Date    COLON SURGERY      COLONOSCOPY W/ POLYPECTOMY N/A 1/21/2019    Procedure: COLONOSCOPY W/ POLYPECTOMY;  Surgeon: Sara Nielsen MD;  Location: [address] [place] [address] GI LAB; Service: General        10/09/19 1350   Note Type   Note type Eval/Treat   Pain Assessment   Pain Assessment No/denies pain   Pain Score No Pain   Home Living   Type of [address] [place] [address] One level;Performs ADLs on one level; Able to live on main level with bedroom/bathroom;Stairs to enter with rails  (3 SAVANNAH) Bathroom Shower/Tub Tub/shower unit   Bathroom Toilet Standard   Bathroom Equipment Grab bars in shower;Grab bars around toilet   [address]   (PTA, pt  did not utilize an AD )   Prior Function   Level of [place] Independent with ADLs and functional mobility; Needs assistance with IADLs   Lives With Alone   Receives Help From Friend(s); Neighbor   ADL Assistance Independent   IADLs Needs assistance  (transportation)   Falls in the last 6 months 0   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)   Restrictions/Precautions   [place] Bearing Precautions Per Order No   Other Precautions Fall Risk;O2  (4 L O2 via NC)   General   Family/Caregiver Present No   Cognition   Overall Cognitive Status WFL   Arousal/Participation Alert   Orientation Level Oriented X4   Memory Within functional limits   Following Commands Follows one step commands with increased time or repetition   RUE Assessment   RUE Assessment   (please see OT assessment)   LUE Assessment   LUE Assessment   (please see OT assessment)   RLE Assessment   RLE Assessment X   Strength RLE   R Hip Flexion 3/5   R Knee Extension 3/5   R Ankle Dorsiflexion 3/5   LLE Assessment   LLE Assessment X   Strength LLE   L Hip Flexion 3/5   L Knee Extension 3/5   L Ankle Dorsiflexion 3/5   Coordination   Movements are Fluid and Coordinated 1   Light Touch   RLE Light Touch Grossly intact   LLE Light Touch Grossly intact   Sharp/Dull   RLE Sharp/Dull Grossly intact   LLE Sharp/Dull Grossly intact   Bed Mobility   Supine to Sit   (Pt  was sitting OOB in recliner upon arrival )   Sit to Supine 4  Minimal assistance   Additional items Assist x 2;Bedrails; Increased time required;Verbal cues;LE management   Transfers   Sit to Stand 3  Moderate assistance   Additional items Assist x 2;Armrests; Increased time required;Verbal cues   Stand to Sit 4  Minimal assistance   Additional items Assist x 2;Bedrails; Increased time required;Verbal cues   Stand pivot 3  Moderate assistance   Additional items Assist x 2; Increased time required;Verbal cues   Ambulation/Elevation   Gait pattern Improper Weight shift;Narrow MIGUEL ÁNGEL; Forward Flexion;Decreased foot clearance; Short stride   Gait Assistance 3  Moderate assist   Additional items Assist x 2;Verbal cues; Tactile cues  (consistently)   Assistive Device Rolling walker   Distance 5 feet  (recliner->bed, was not progressed 2/2 medical complications)   Stair Management Assistance Not tested   Balance   Static Sitting Fair +   Dynamic Sitting Fair   Static Standing Fair -   Dynamic Standing Fair -   Ambulatory Poor +   Endurance Deficit   Endurance Deficit Yes   Endurance Deficit Description SOB,JOHNSON present upon mobility tasks, resolved w/rest in left S/L in bed  Activity Tolerance   Activity Tolerance Patient limited by fatigue   Nurse Made Aware yes   Assessment   Prognosis Fair   Problem List Decreased strength;Decreased endurance; Impaired balance;Decreased mobility; Impaired judgement;Decreased safety awareness; Obesity   Assessment Pt is 82 y o  female seen for PT evaluation s/p admit to [address] [place] [address] on 10/9/2019 w/ COPD with acute exacerbation ([facility] [address] )  PT consulted to assess pt's functional mobility and d/c needs  Order placed for PT eval and tx, w/ activity as tolerated order  Comorbidities affecting pt's physical performance at time of assessment include: SOB,JOHNSNO,COPD w/acute exacerbation, chronic anxiety, chronic A-fib, CRF w/hypoxia,asthma, chronic O2 usage  PTA, pt was independent w/ all functional mobility w/ o AD   Personal factors affecting pt at time of IE include: inaccessible home environment, stairs to enter home, inability to ambulate household distances, inability to navigate community distances, inability to navigate level surfaces w/o external assistance, unable to perform dynamic tasks in community, anxiety, impulsivity, compliance, unable to perform physical activity, limited insight into impairments and inability to perform ADLs  Please find objective findings from PT assessment regarding body systems outlined above with impairments and limitations including weakness, impaired balance, decreased endurance, impaired coordination, gait deviations, decreased activity tolerance, decreased functional mobility tolerance, decreased safety awareness, impaired judgement, fall risk and SOB upon exertion  The following objective measures performed on IE also reveal limitations: Barthel Index: 35/100  Pt's clinical presentation is currently unstable/unpredictable  Pt to benefit from continued PT tx to address deficits as defined above and maximize level of functional independent mobility and consistency  From PT/mobility standpoint, recommendation at time of d/c would be STR pending progress in order to facilitate return to PLOF  Barriers to Discharge Decreased caregiver support; Inaccessible home environment   Barriers to Discharge Comments SAVANNAH home, pt  lives alone   Goals   Patient Goals feel better soon   LTG Expiration Date 10/19/19   Long Term Goal #1 1 )Patient will complete bed mobility with supervision of 1 for decrease need for caregiver assistance, decrease burden of care  2 ) Patient will complete transfers with CGA of 1 to decrease risk of falls, facilitate upright standing posture  3 ) BLE strength to greater than/equal to 4-/5 gross musculature to increase ability to safely transfer, control descent to chair  4 ) Patient will exhibit increase static standing balance to Fair+ , for 30-45 seconds without LOB and CGA of 1 to improve activity tolerance & endurance  5 ) Patient will exhibit increase dynamic ambulatory balance to Fair+ for 25-75 feet w/RW min A of 1  to improve ability to mobilize to toilet, chair and decrease risk for additional medical complications   6 ) Patient will exhibit good self monitoring and ability to follow 2 step commands to increase complexity of tasks and resume ADL's without LOB  PT Treatment Day 1   Plan   Treatment/Interventions Functional transfer training;LE strengthening/ROM; Therapeutic exercise; Endurance training;Patient/family training;Equipment eval/education; Bed mobility;Gait training; Compensatory technique education;OT   PT Frequency Other (Comment)  (3-5x/wk)   Recommendation   Recommendation Short-term skilled PT   Equipment Recommended Walker   PT - OK to Discharge No   Additional Comments Upon conclusion, pt  was resting in bed w/all needs within reach & SCD's active  Barthel Index   Feeding 5   Bathing 0   Grooming Score 0   Dressing Score 5   Bladder Score 5   Bowels Score 10   Toilet Use Score 5   Transfers (Bed/Chair) Score 5   Mobility (Level Surface) Score 0   Stairs Score 0   Barthel Index Score 35     Additional skilled interventions: Therapeutic Activity x 10 minutes    S: No reported pain prior or during session, patient requested return BTB 2/2 feeling sweaty and SOB;A&Ox4  O: therapeutic activity x 10 minutes including mobilization education: bed mobility, sit->supine, static/dynamic sitting balance w/ postural facilitation, sit<->stand transfers, static/dynamic standing balance w/ postural facilitation,breathing conservation techniques and continued safety training for increased awareness  A: Patient exhibited severe weakness,SOB,JOHNSON, impaired balance, gait dysfunction, decreased endurance, activity intolerance, and decline from PLOF to benefit from continued interventions  P: Continue PT tx with progression of functional tasks as patient can safely tolerate, 3-5x/week      Kandy Rios PT

## 2019-10-09 NOTE — OCCUPATIONAL THERAPY NOTE
Occupational Therapy Evaluation      Grace Tadeo    10/9/2019    Patient Active Problem List   Diagnosis    Rectal bleed    Asthma    COPD with acute exacerbation ([facility] )    Cardiac disease    Diverticulosis of intestine with bleeding    Functional diarrhea    Colorectal polyps    Dyspnea    Effusion of bursa of left elbow    Cardiomegaly    Chronic anxiety    Chronic cystitis    Chronic low back pain    Congestive heart failure (HCC)    Deviated nasal septum    Diverticulosis of colon    Gastroesophageal reflux disease without esophagitis    Gout    Herpes zoster    History of angioedema    History of colonic polyps    Lower gastrointestinal hemorrhage    Lumbar radiculopathy    Macular degeneration of both eyes    Obesity    Osteoporosis    Other specified forms of chronic ischemic heart disease    Seasonal allergies    Panic attack    Vocal cord dysfunction    Colitis presumed infectious    Chronic atrial fibrillation    Left elbow pain    Chronic respiratory failure with hypoxia (HCC)       Past Medical History:   Diagnosis Date    A-fib ([place] [address] )     Asthma     Blurred vision     Cardiac disease     COPD (chronic obstructive pulmonary disease) (HCC)     Diverticulosis     Emphysema lung ([facility] )        Past Surgical History:   Procedure Laterality Date    COLON SURGERY      COLONOSCOPY W/ POLYPECTOMY N/A 1/21/2019    Procedure: COLONOSCOPY W/ POLYPECTOMY;  Surgeon: John Nguyễn MD;  Location: [address] [place] [address] GI LAB; Service: General        10/09/19 1349   Note Type   Note type Eval only   Restrictions/Precautions   Other Precautions Fall Risk;O2  (4 L O2 via NC)   Pain Assessment   Pain Assessment No/denies pain   Pain Score No Pain   Home Living   Type of [address] [place] [address] One level;Performs ADLs on one level; Able to live on main level with bedroom/bathroom;Stairs to enter with rails  (3 S[address]NAH)   Bathroom Shower/Tub Tub/shower unit   Bathroom Toilet Standard   Bathroom Equipment Grab bars in shower;Grab bars around toilet   [address]   (no AD used at baseline )   Prior Function   Level of [place] Independent with ADLs and functional mobility; Needs assistance with IADLs   Lives With Alone   Receives Help From Friend(s); Neighbor   ADL Assistance Independent   IADLs Needs assistance  (transportation)   Falls in the last 6 months 0   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)   Comments Patient reported being discharged yesterday and waking up this morning with significant SOB  Lifestyle   Autonomy Patient reporting being independent with ADLs, ambulatory with no AD and lives alone in a one story house with 3 SAVANNAH   ADL   Eating Assistance 5  Supervision/Setup   Grooming Assistance 5  Supervision/Setup   UB Bathing Assistance 4  Minimal Assistance   LB [address] 4  Minimal Assistance   UB Dressing Assistance 4  Minimal Assistance   LB Dressing Assistance 4  [address]  4  Minimal Assistance   Bed Mobility   Sit to Supine 4  Minimal assistance   Additional items Assist x 2;Bedrails; Increased time required;Verbal cues;LE management   Transfers   Sit to Stand 3  Moderate assistance   Additional items Assist x 2;Armrests; Increased time required;Verbal cues   Stand to Sit 4  Minimal assistance   Additional items Assist x 2;Bedrails; Increased time required;Verbal cues   Stand pivot 3  Moderate assistance   Additional items Assist x 2; Increased time required;Verbal cues   Balance   Static Sitting Fair +   Dynamic Sitting Fair   Static Standing Fair -   Dynamic Standing Fair -   Activity Tolerance   Activity Tolerance Patient limited by fatigue;Treatment limited secondary to medical complications (Comment)  (SOB/JOHNSON)   Nurse Made Aware RN made aware of outcomes    RUE Assessment   RUE Assessment WFL  (grossly 4-/5 MMT)   LUE Assessment   LUE Assessment WFL  (grossly 4-/5 MMT)   Hand Function Gross Motor Coordination Functional   Fine Motor Coordination Functional   Cognition   Overall Cognitive Status WFL   Arousal/Participation Alert; Responsive; Cooperative   Attention Within functional limits   Orientation Level Oriented X4   Memory Decreased short term memory   Following Commands Follows one step commands with increased time or repetition   Comments Mini-Cog assessment performed today  Version 1 was given  Patient able to recall 1/3 words  Patient able to draw an abnormal clock with the incorrect time  Total score of test was 1/5 indicating  further screening of cognition is recommended  Cognition Assessment Tools   (Mini-cog )   Score 1   Assessment   Limitation Decreased ADL status; Decreased UE strength;Decreased Safe judgement during ADL;Decreased cognition;Decreased endurance;Decreased self-care trans;Decreased high-level ADLs   Prognosis Fair   Assessment Pt is a 82 y o  female who was admitted to [address] [place] [address] on 10/9/2019 with COPD with acute exacerbation ([facility] [address] )  At this time, patient is also affected by the comorbidities of: SOB,JOHNSON,COPD w/acute exacerbation, chronic anxiety, chronic A-fib, CRF w/hypoxia,asthma, and chronic O2 usage  Additionally, patient is affected by the following personal factors:limited home support, difficulty performing ADLS and difficulty performing IADLS   Orders placed for OT evaluation/treatment with up with assistance orders  Prior to admission, Patient reporting being independent with ADLs, ambulatory with no AD and lives alone in a one story house with 3 SAVANNAH  Upon OT evaluation, patient requires minimum assist for UB ADLs and minimum assist for LB ADLs  Occupational performance is affected by the following deficits: decreased strength, decreased balance, decreased tolerance, impaired memory, impaired sequencing and impaired problem solving   Based on the following information, patient would benefit from continued skilled OT treatment while in the hospital to address deficits and maximize level of functional independence with ADL's and functional mobility  Occupational Performance areas to address include: grooming, bathing/shower, toilet hygiene, dressing, medication management, functional mobility, clothing management, meal prep and household maintenance  From OT standpoint, recommendation at time of d/c would be short term rehab  Goals   Patient Goals to feel better    Plan   Treatment Interventions ADL retraining;Functional transfer training;UE strengthening/ROM; Endurance training;Cognitive reorientation;Patient/family training;Equipment evaluation/education; Compensatory technique education; Energy conservation   Goal Expiration Date 10/19/19   OT Treatment Day 0   OT Frequency 3-5x/wk   Recommendation   OT Discharge Recommendation Short Term Rehab   OT - OK to Discharge Yes  (Once medically cleared)   Barthel Index   Feeding 5   Bathing 0   Grooming Score 0   Dressing Score 5   Bladder Score 5   Bowels Score 10   Toilet Use Score 5   Transfers (Bed/Chair) Score 5   Mobility (Level Surface) Score 0   Stairs Score 0   Barthel Index Score 35     GOALS:    Pt will achieve the following within specified time frame: STG  Pt will achieve the following goals within 5 days    *ADL transfers with (S) for inc'd independence with ADLs/purposeful tasks    *UB ADL with (S) for inc'd independence with self cares    *LB ADL with (S) using AE prn for inc'd independence with self cares    *Toileting with (S) for clothing management and hygiene for return to PLOF with personal care    *Increase static stand balance to fair+ and dyn stand balance to fair for inc'd safety with standing purposeful tasks    *Increase stand tolerance x5 m for inc'd tolerance with standing purposeful tasks    *Participate in 10m UE therex to increase overall stamina/activity tolerance for purposeful tasks    *Bed mobility- (S) for inc'd independence to manage own comfort and initiate EOB & OOB purposeful tasks    *Patient will verbalize 3 safety awareness/ principles to prevent falls in the home setting  *Patient will verbalize and demonstrate use of energy conservation/deep breathing techniques and work simplification skills during functional activities with no verbal cues  Pt will achieve the following within specified time frame: LTG  Pt will achieve the following goals within 10 days    *ADL transfers with (I) for inc'd independence with ADLs/purposeful tasks    *Self Feeding- (I) for inc'd independence with providing self nourishment    *UB ADL with (I) for inc'd independence with self cares    *LB ADL with (I) using AE prn for inc'd independence with self cares    *Toileting with (I) for clothing management and hygiene for return to PLOF with personal care    *Increase static stand balance to good and dyn stand balance to fair+ for inc'd safety with standing purposeful tasks    *Increase stand tolerance x10 m for inc'd tolerance with standing purposeful tasks    *Bed mobility- (I) for inc'd independence to manage own comfort and initiate EOB & OOB purposeful tasks    *Patient will increase OOB/sitting tolerance to 2-4 hours per day to increase participation in self-care and leisure tasks with no s/s of exertion           Samuel Celaya MS, OTR/L

## 2019-10-09 NOTE — RESPIRATORY THERAPY NOTE
RT Protocol Note  Bobby Yip 82 y o  female MRN: [id number]  Unit/Bed#: -01 Encounter: [id number]    Assessment    Principal Problem:    COPD with acute exacerbation ([place] [address] )  Active Problems:    Chronic anxiety    Chronic low back pain    Chronic atrial fibrillation    Chronic respiratory failure with hypoxia (HCC)      Home Pulmonary Medications:  Albuterol MDI TID PRN  Trelegy Ellipta daily  Combivent Q 6 hour PRN  Home O2    Home Devices/Therapy: Home O2, Other (Comment)(Albuterol MDI TID PRN, Trelegy Ellipta daily, Combivent Q6  )    Past Medical History:   Diagnosis Date    A-fib ([place] [address] )     Asthma     Blurred vision     Cardiac disease     COPD (chronic obstructive pulmonary disease) ([place] [address] )     Diverticulosis     Emphysema lung ([place] [address] )      Social History     Socioeconomic History    Marital status:       Spouse name: None    Number of children: None    Years of education: None    Highest education level: None   Occupational History    None   Social Needs    Financial resource strain: None    Food insecurity:     Worry: None     Inability: None    Transportation needs:     Medical: None     Non-medical: None   Tobacco Use    Smoking status: Former Smoker     Last attempt to quit: 2013     Years since quittin 8    Smokeless tobacco: Never Used   Substance and Sexual Activity    Alcohol use: Not Currently     Frequency: Never     Binge frequency: Never    Drug use: No    Sexual activity: Not Currently   Lifestyle    Physical activity:     Days per week: None     Minutes per session: None    Stress: None   Relationships    Social connections:     Talks on phone: None     Gets together: None     Attends Taoism service: None     Active member of club or organization: None     Attends meetings of clubs or organizations: None     Relationship status: None    Intimate partner violence:     Fear of current or ex partner: None     Emotionally abused: None Physically abused: None     Forced sexual activity: None   Other Topics Concern    None   Social History Narrative    None       Subjective         Objective    Physical Exam:   Assessment Type: Pre-treatment  General Appearance: Drowsy  Respiratory Pattern: Dyspnea at rest  Bilateral Breath Sounds: Diminished  Cough: Non-productive, Moist, Weak  O2 Device: 4 l/m O2 NC(O2 decreased to 3l/m via NC  )    Vitals:  Blood pressure 109/55, pulse (P) 71, temperature 98 3 °F (36 8 °C), temperature source Tympanic, resp  rate (P) 20, height 5' (1 524 m), weight 77 3 kg (170 lb 6 7 oz), SpO2 95 %  Imaging and other studies: I have personally reviewed pertinent reports  O2 Device: 4 l/m O2 NC(O2 decreased to 3l/m via NC  )     Plan    Respiratory Plan: Home Bronchodilator Patient pathway        Resp Comments: Sleeping soundly, post UDN

## 2019-10-09 NOTE — PLAN OF CARE
Problem: Potential for Falls  Goal: Patient will remain free of falls  Description  INTERVENTIONS:  - Assess patient frequently for physical needs  -  Identify cognitive and physical deficits and behaviors that affect risk of falls    -  [place] fall precautions as indicated by assessment   - Educate patient/family on patient safety including physical limitations  - Instruct patient to call for assistance with activity based on assessment  - Modify environment to reduce risk of injury  - Consider OT/PT consult to assist with strengthening/mobility  Outcome: Progressing     Problem: RESPIRATORY - ADULT  Goal: Achieves optimal ventilation and oxygenation  Description  INTERVENTIONS:  - Assess for changes in respiratory status  - Assess for changes in mentation and behavior  - Position to facilitate oxygenation and minimize respiratory effort  - Oxygen administered by appropriate delivery if ordered  - Initiate smoking cessation education as indicated  - Encourage broncho-pulmonary hygiene including cough, deep breathe, Incentive Spirometry  - Assess the need for suctioning and aspirate as needed  - Assess and instruct to report SOB or any respiratory difficulty  - Respiratory Therapy support as indicated  Outcome: Progressing

## 2019-10-09 NOTE — ED NOTES
Cleansed for incontinence of stool  Underpad placed on bed and pt assisted into position of comfort therafter        Lamberto Zabala RN  10/09/19 0783

## 2019-10-09 NOTE — ED RE-EVALUATION NOTE
I took over the care of Jessica Jones from Dr Herbert Souza this morning  Patient is an 65-year-old female who was brought to the emergency department by ambulance crew due to shortness of breath started several hours prior to arrival   Patient denies fever or chills  Patient was recently admitted in this hospital for atrial fibrillation with rapid ventricular response which resolved and is now on NSR  On exams, patient is awake and alert, not in respiratory distress  EKG today shows NSR with PAC's and normal heart rate  Dr Herbert Souza has ordered labs and CXR  Will follow up results       Nasima Nowak MD  10/09/19 9748

## 2019-10-09 NOTE — PLAN OF CARE
Problem: PHYSICAL THERAPY ADULT  Goal: Performs mobility at highest level of function for planned discharge setting  See evaluation for individualized goals  Description  Treatment/Interventions: Functional transfer training, LE strengthening/ROM, Therapeutic exercise, Endurance training, Patient/family training, Equipment eval/education, Bed mobility, Gait training, Compensatory technique education, OT  Equipment Recommended: Alfredo Pelayo       See flowsheet documentation for full assessment, interventions and recommendations  Note:   Prognosis: Fair  Problem List: Decreased strength, Decreased endurance, Impaired balance, Decreased mobility, Impaired judgement, Decreased safety awareness, Obesity  Assessment: Pt is 82 y o  female seen for PT evaluation s/p admit to [address] [place] [address] on 10/9/2019 w/ COPD with acute exacerbation ([facility] [address] )  PT consulted to assess pt's functional mobility and d/c needs  Order placed for PT eval and tx, w/ activity as tolerated order  Comorbidities affecting pt's physical performance at time of assessment include: SOB,JOHNSON,COPD w/acute exacerbation, chronic anxiety, chronic A-fib, CRF w/hypoxia,asthma, chronic O2 usage  PTA, pt was independent w/ all functional mobility w/ o AD  Personal factors affecting pt at time of IE include: inaccessible home environment, stairs to enter home, inability to ambulate household distances, inability to navigate community distances, inability to navigate level surfaces w/o external assistance, unable to perform dynamic tasks in community, anxiety, impulsivity, compliance, unable to perform physical activity, limited insight into impairments and inability to perform ADLs   Please find objective findings from PT assessment regarding body systems outlined above with impairments and limitations including weakness, impaired balance, decreased endurance, impaired coordination, gait deviations, decreased activity tolerance, decreased functional mobility tolerance, decreased safety awareness, impaired judgement, fall risk and SOB upon exertion  The following objective measures performed on IE also reveal limitations: Barthel Index: 35/100  Pt's clinical presentation is currently unstable/unpredictable  Pt to benefit from continued PT tx to address deficits as defined above and maximize level of functional independent mobility and consistency  From PT/mobility standpoint, recommendation at time of d/c would be STR pending progress in order to facilitate return to PLOF  Barriers to Discharge: Decreased caregiver support, Inaccessible home environment  Barriers to Discharge Comments: SAVANNAH home, pt  lives alone  Recommendation: Short-term skilled PT     PT - OK to Discharge: No    See flowsheet documentation for full assessment     Adelina Cogan, PT

## 2019-10-09 NOTE — H&P
H&P- Pastor Garcia 1935, 82 y o  female MRN: [id number]    Unit/Bed#: -01 Encounter: [id number]    Primary Care Provider: Keya Faust MD   Date and time admitted to hospital: 10/9/2019  6:21 AM      Addendum:  After further discussion with the patient, apparently she has been noncompliant with medications, only wears oxygen intermittently, refusing care at times, and notes that she wants to go home from the hospital to buy cigarettes for her son at a nursing facility  In light of her multiple admissions to the hospital, medication noncompliance, and numerous life stressors, will consult Psychiatry for their thoughts and recommendations    Chronic respiratory failure with hypoxia ([address] )  Assessment & Plan  · On 5 L nasal cannula at home which we will continue    Chronic atrial fibrillation  Assessment & Plan  · Continue home regimen of Cardizem and Eliquis    Chronic low back pain  Assessment & Plan  · PT/OT    Chronic anxiety  Assessment & Plan  · Continue alprazolam home regimen    * COPD with acute exacerbation (HCC)  Assessment & Plan  · COPD with acute exacerbation  · Steroids, nebulizers  · Continue home regimen of Singulair      VTE Prophylaxis: Apixaban (Eliquis)  Code Status: full  POLST: POLST is not applicable to this patient  Discussion with family: patient    Anticipated Length of Stay:  Patient will be admitted on an Observation basis with an anticipated length of stay of  < 2 midnights  Justification for Hospital Stay: copd exacerbation    Chief Complaint:   Shortness of breath    History of Present Illness:    Pastor Garcia is a 82 y o  female with COPD, on 5 L nasal cannula chronically at home, recent diagnosis of atrial fibrillation, who presents with shortness of breath  Patient was just recently admitted and discharged for new onset atrial fibrillation    She notes that she had been feeling well yesterday until she went home, at which time she developed shortness of breath, wheezing along with back pain  She notes that she has chronic back pain related to her sciatica which she notes has acting up  Patient otherwise denies any chest pain, nausea, vomiting, fevers, chills  Of note, patient has numerous stressors at home which she feels like is contributing to her anxiety and shortness of breath  Review of Systems:  Review of Systems   Constitutional: Positive for fatigue  Respiratory: Positive for shortness of breath and wheezing  Musculoskeletal: Positive for back pain and gait problem  All other systems reviewed and are negative  Past Medical and Surgical History:   Past Medical History:   Diagnosis Date    [facility]fib ([facility] )     Asthma     Blurred vision     Cardiac disease     [facility]     Diverticulosis     Emphysema lung ([facility] )        Past Surgical History:   Procedure Laterality Date    COLON SURGERY      COLONOSCOPY W/ POLYPECTOMY N/A 1/21/2019    Procedure: COLONOSCOPY W/ POLYPECTOMY;  Surgeon: Esequiel Rosario MD;  Location: [address] [place] [address] GI LAB; Service: General       Meds/Allergies:  Prior to Admission medications    Medication Sig Start Date End Date Taking?  Authorizing Provider   Albuterol Sulfate 108 (90 Base) MCG/ACT AEPB Inhale 2 puffs 3 (three) times a day as needed    Historical Provider, MD   alendronate (FOSAMAX) 70 mg tablet Take 70 mg by mouth every 7 days    Historical Provider, MD   ALPRAZolam Paddy Bebe) 0 5 mg tablet Take 0 5 mg by mouth daily at bedtime as needed    Historical Provider, MD   apixaban (ELIQUIS) 5 mg Take 1 tablet (5 mg total) by mouth 2 (two) times a day 10/8/19   David Samuel MD   Calcium Carb-Cholecalciferol (CALCIUM 1000 + D PO) Take 1,000 mg by mouth daily 3/28/18   Historical Provider, MD   diltiazem (CARDIZEM CD) 180 mg 24 hr capsule Take 1 capsule (180 mg total) by mouth daily 10/8/19   David Samuel MD   famotidine (PEPCID) 40 MG tablet Take 40 mg by mouth as needed Historical Provider, MD   fluticasone-umeclidinium-vilanterol (TRELEGY ELLIPTA) 100-62 5-25 MCG/INH inhaler 1 puff daily 19   Historical Provider, MD   furosemide (LASIX) 20 mg tablet Take 20 mg by mouth daily    Historical Provider, MD   ipratropium-albuterol (COMBIVENT RESPIMAT) inhaler Every 6 hours    Historical Provider, MD   montelukast (SINGULAIR) 10 mg tablet Take 10 mg by mouth daily at bedtime    Historical Provider, MD   potassium chloride (MICRO-K) 10 MEQ CR capsule Take 10 mEq by mouth daily    Historical Provider, MD     I have reviewed home medications with patient personally  Allergies: Allergies   Allergen Reactions    Fluticasone        Social History:  Marital Status:    Occupation: n/a  Patient Pre-hospital Living Situation: home  Patient Pre-hospital Level of Mobility: limited  Patient Pre-hospital Diet Restrictions: none  Substance Use History:   Social History     Substance and Sexual Activity   Alcohol Use Not Currently    Frequency: Never    Binge frequency: Never     Social History     Tobacco Use   Smoking Status Former Smoker    Last attempt to quit: 2013    Years since quittin 8   Smokeless Tobacco Never Used     Social History     Substance and Sexual Activity   Drug Use No       Family History:  I have reviewed the patients family history    Physical Exam:   Vitals:   Blood Pressure: 109/55 (10/09/19 0826)  Pulse: 94 (10/09/19 0826)  Temperature: (!) 100 6 °F (38 1 °C) (10/09/19 0826)  Temp Source: Tympanic (10/09/19 0826)  Respirations: 22 (10/09/19 0826)  Height: 5' (152 4 cm) (10/09/19 0625)  Weight - Scale: 77 3 kg (170 lb 6 7 oz) (10/09/19 0821)  SpO2: 94 % (10/09/19 0826)    Physical Exam   Constitutional: She is oriented to person, place, and time  She appears well-developed and well-nourished  HENT:   Head: Normocephalic and atraumatic  Eyes: Pupils are equal, round, and reactive to light  EOM are normal    Neck: Normal range of motion   Neck supple  Cardiovascular: Normal rate and regular rhythm  Pulmonary/Chest: Effort normal  She has wheezes  Abdominal: Soft  Bowel sounds are normal    Obese   Musculoskeletal: Normal range of motion  She exhibits no edema  Neurological: She is alert and oriented to person, place, and time  Skin: Skin is warm  Capillary refill takes less than 2 seconds  Psychiatric: She has a normal mood and affect  Her behavior is normal  Thought content normal    Nursing note and vitals reviewed  Additional Data:   Lab Results: I have personally reviewed pertinent reports  Results from last 7 days   Lab Units 10/09/19  0651   WBC Thousand/uL 11 20*   HEMOGLOBIN g/dL 12 7   HEMATOCRIT % 38 0*   PLATELETS Thousands/uL 198   NEUTROS PCT % 86*   LYMPHS PCT % 6*   MONOS PCT % 7   EOS PCT % 1     Results from last 7 days   Lab Units 10/09/19  0651   POTASSIUM mmol/L 4 4   CHLORIDE mmol/L 97*   CO2 mmol/L 30   BUN mg/dL 12   CREATININE mg/dL 0 51*   CALCIUM mg/dL 8 7   ALK PHOS U/L 72   ALT U/L 25   AST U/L 15                   Imaging: I have personally reviewed pertinent reports  XR chest 1 view portable   ED Interpretation by Mercedez Roberts MD (99/61 [id number])   No acute disease      Final Result by Lexus Kay MD (10/09 0708)      No acute cardiopulmonary disease  Emphysema, better demonstrated on CT  Workstation performed: [id number]             EKG, Pathology, and Other Studies Reviewed on Admission:   · EKG: n/a    NetAccess / Epic Records Reviewed: Yes     ** Please Note: This note has been constructed using a voice recognition system   **

## 2019-10-09 NOTE — PLAN OF CARE
Problem: OCCUPATIONAL THERAPY ADULT  Goal: Performs self-care activities at highest level of function for planned discharge setting  See evaluation for individualized goals  Description  Treatment Interventions: ADL retraining, Functional transfer training, UE strengthening/ROM, Endurance training, Cognitive reorientation, Patient/family training, Equipment evaluation/education, Compensatory technique education, Energy conservation          See flowsheet documentation for full assessment, interventions and recommendations  Note:   Limitation: Decreased ADL status, Decreased UE strength, Decreased Safe judgement during ADL, Decreased cognition, Decreased endurance, Decreased self-care trans, Decreased high-level ADLs  Prognosis: Fair  Assessment: Pt is a 82 y o  female who was admitted to [address] [place] [address] on 10/9/2019 with COPD with acute exacerbation ([facility] [address] )  At this time, patient is also affected by the comorbidities of: SOB,JOHNSON,COPD w/acute exacerbation, chronic anxiety, chronic A-fib, CRF w/hypoxia,asthma, and chronic O2 usage  Additionally, patient is affected by the following personal factors:limited home support, difficulty performing ADLS and difficulty performing IADLS   Orders placed for OT evaluation/treatment with up with assistance orders  Prior to admission, Patient reporting being independent with ADLs, ambulatory with no AD and lives alone in a one story house with 3 SAVANNAH  Upon OT evaluation, patient requires minimum assist for UB ADLs and minimum assist for LB ADLs  Occupational performance is affected by the following deficits: decreased strength, decreased balance, decreased tolerance, impaired memory, impaired sequencing and impaired problem solving  Based on the following information, patient would benefit from continued skilled OT treatment while in the hospital to address deficits and maximize level of functional independence with ADL's and functional mobility   Occupational Performance areas to address include: grooming, bathing/shower, toilet hygiene, dressing, medication management, functional mobility, clothing management, meal prep and household maintenance  From OT standpoint, recommendation at time of d/c would be short term rehab       OT Discharge Recommendation: Short Term Rehab  OT - OK to Discharge: Yes(Once medically cleared)     Demian Wilkerson, OT

## 2019-10-09 NOTE — ED PROVIDER NOTES
History  Chief Complaint   Patient presents with    Shortness of Breath      pt states that all night she was feeling lousy and increasing SOB , discharged tuesday for shortness of breath r/t new onset afib with RVR     82-year-old female recently admitted and discharged from ear when she came in with elbow pain, subsequently broke into an atrial fib with rapid ventricular response  She was placed on Cardizem IV then p o , discharged on Eliquis  She now presents with a multitude of complaints including shortness of breath, cough, weakness, elbow pain persists as well as generalized myalgias  She has low back pain as well  Her AFib is now rate controlled in the , appears to a converted to sinus but has frequent ectopy  She denies fever or chills  Denies any falls or trauma  Was discharged here yesterday  Has a underlying history of O2 dependent COPD, as a olecranon bursitis which she was supposed to try to get drain today  Prior to Admission Medications   Prescriptions Last Dose Informant Patient Reported? Taking?    ALPRAZolam (XANAX) 0 5 mg tablet   Yes No   Sig: Take 0 5 mg by mouth daily at bedtime as needed   Albuterol Sulfate 108 (90 Base) MCG/ACT AEPB   Yes No   Sig: Inhale 2 puffs 3 (three) times a day as needed   Calcium Carb-Cholecalciferol (CALCIUM 1000 + D PO)   Yes No   Sig: Take 1,000 mg by mouth daily   alendronate (FOSAMAX) 70 mg tablet  Self Yes No   Sig: Take 70 mg by mouth every 7 days   apixaban (ELIQUIS) 5 mg   No No   Sig: Take 1 tablet (5 mg total) by mouth 2 (two) times a day   diltiazem (CARDIZEM CD) 180 mg 24 hr capsule   No No   Sig: Take 1 capsule (180 mg total) by mouth daily   famotidine (PEPCID) 40 MG tablet   Yes No   Sig: Take 40 mg by mouth as needed    fluticasone-umeclidinium-vilanterol (TRELEGY ELLIPTA) 100-62 5-25 MCG/INH inhaler   Yes No   Si puff daily   furosemide (LASIX) 20 mg tablet  Self Yes No   Sig: Take 20 mg by mouth daily   ipratropium-albuterol (COMBIVENT RESPIMAT) inhaler   Yes No   Sig: Every 6 hours   montelukast (SINGULAIR) 10 mg tablet  Self Yes No   Sig: Take 10 mg by mouth daily at bedtime   potassium chloride (MICRO-K) 10 MEQ CR capsule  Self Yes No   Sig: Take 10 mEq by mouth daily      Facility-Administered Medications: None       Past Medical History:   Diagnosis Date    A-fib ([place] [address] )     Asthma     Blurred vision     Cardiac disease     COPD (chronic obstructive pulmonary disease) ([place] [address] )     Diverticulosis     Emphysema lung ([place] [address] )        Past Surgical History:   Procedure Laterality Date    COLON SURGERY      COLONOSCOPY W/ POLYPECTOMY N/A 2019    Procedure: COLONOSCOPY W/ POLYPECTOMY;  Surgeon: Yeni Bob MD;  Location: [facility] GI LAB; Service: General       History reviewed  No pertinent family history  I have reviewed and agree with the history as documented  Social History     Tobacco Use    Smoking status: Former Smoker     Last attempt to quit: 2013     Years since quittin 8    Smokeless tobacco: Never Used   Substance Use Topics    Alcohol use: Not Currently     Frequency: Never     Binge frequency: Never    Drug use: No        Review of Systems   Constitutional: Negative for chills and fever  HENT: Negative for rhinorrhea and sore throat  Eyes: Negative for visual disturbance  Respiratory: Positive for shortness of breath and wheezing  Negative for cough  Cardiovascular: Negative for chest pain and leg swelling  Gastrointestinal: Negative for abdominal pain, diarrhea, nausea and vomiting  Genitourinary: Negative for dysuria  Musculoskeletal: Positive for arthralgias, back pain and myalgias  Skin: Negative for rash  Neurological: Negative for dizziness and headaches  Psychiatric/Behavioral: Negative for confusion  All other systems reviewed and are negative  Physical Exam  Physical Exam   Constitutional: She is oriented to person, place, and time   She appears well-developed and well-nourished  HENT:   Nose: Nose normal    Mouth/Throat: Oropharynx is clear and moist  No oropharyngeal exudate  Eyes: Pupils are equal, round, and reactive to light  Conjunctivae and EOM are normal  No scleral icterus  Neck: Normal range of motion  Neck supple  No JVD present  No tracheal deviation present  Cardiovascular: Normal rate and normal heart sounds  No murmur heard  Distal heart sounds with irregular heartbeat   Pulmonary/Chest: Effort normal  No respiratory distress  She has decreased breath sounds  She has wheezes in the right upper field, the right middle field, the right lower field, the left upper field, the left middle field and the left lower field  She has no rales  Abdominal: Soft  Bowel sounds are normal  There is no tenderness  There is no guarding  Musculoskeletal: Normal range of motion  She exhibits no edema or tenderness  Neurological: She is alert and oriented to person, place, and time  No cranial nerve deficit or sensory deficit  She exhibits normal muscle tone  5/5 motor, nl sens   Skin: Skin is warm and dry  Psychiatric: She has a normal mood and affect  Her behavior is normal    Nursing note and vitals reviewed        Vital Signs  ED Triage Vitals [10/09/19 0625]   Temperature Pulse Respirations Blood Pressure SpO2   98 4 °F (36 9 °C) 88 (!) 30 113/58 96 %      Temp Source Heart Rate Source Patient Position - Orthostatic VS BP Location FiO2 (%)   Temporal Monitor Sitting Left arm --      Pain Score       9           Vitals:    10/09/19 0625   BP: 113/58   Pulse: 88   Patient Position - Orthostatic VS: Sitting         Visual Acuity      ED Medications  Medications - No data to display    Diagnostic Studies  Results Reviewed     None                 No orders to display              Procedures  ECG 12 Lead Documentation Only  Date/Time: 10/9/2019 6:54 AM  Performed by: Bret Napier DO  Authorized by: Bret Napier DO     Indications / Diagnosis: Shortness of breath  ECG reviewed by me, the ED Provider: yes    Patient location:  ED  Previous ECG:     Previous ECG:  Compared to current    Comparison ECG info: Has gone from atrial fibrillation to sinus with the multiple PACs    Similarity:  Changes noted    Comparison to cardiac monitor: Yes    Interpretation:     Interpretation: abnormal    Rate:     ECG rate:  99    ECG rate assessment: normal    Rhythm:     Rhythm: sinus rhythm    Ectopy:     Ectopy: PAC    QRS:     QRS axis:  Left  ST segments:     ST segments:  Non-specific  T waves:     T waves: non-specific             ED Course                               MDM    Disposition  Final diagnoses:   None     ED Disposition     None      Follow-up Information    None         Patient's Medications   Discharge Prescriptions    No medications on file     No discharge procedures on file      ED Provider  Electronically Signed by           Aidee Gonzalez DO  10/09/19 8405

## 2019-10-10 VITALS
HEART RATE: 94 BPM | DIASTOLIC BLOOD PRESSURE: 62 MMHG | OXYGEN SATURATION: 97 % | BODY MASS INDEX: 33.46 KG/M2 | SYSTOLIC BLOOD PRESSURE: 112 MMHG | WEIGHT: 170.42 LBS | RESPIRATION RATE: 22 BRPM | HEIGHT: 60 IN | TEMPERATURE: 98.3 F

## 2019-10-10 DIAGNOSIS — Z71.89 COMPLEX CARE COORDINATION: Primary | ICD-10-CM

## 2019-10-10 PROBLEM — J44.1 COPD WITH ACUTE EXACERBATION (HCC): Chronic | Status: RESOLVED | Noted: 2018-12-28 | Resolved: 2019-10-10

## 2019-10-10 LAB — PROCALCITONIN SERPL-MCNC: 0.09 NG/ML

## 2019-10-10 PROCEDURE — 97110 THERAPEUTIC EXERCISES: CPT

## 2019-10-10 PROCEDURE — 94760 N-INVAS EAR/PLS OXIMETRY 1: CPT

## 2019-10-10 PROCEDURE — 84145 PROCALCITONIN (PCT): CPT | Performed by: INTERNAL MEDICINE

## 2019-10-10 PROCEDURE — 94640 AIRWAY INHALATION TREATMENT: CPT

## 2019-10-10 PROCEDURE — 97116 GAIT TRAINING THERAPY: CPT

## 2019-10-10 PROCEDURE — 99217 PR OBSERVATION CARE DISCHARGE MANAGEMENT: CPT | Performed by: INTERNAL MEDICINE

## 2019-10-10 PROCEDURE — 97112 NEUROMUSCULAR REEDUCATION: CPT

## 2019-10-10 RX ORDER — PREDNISONE 20 MG/1
40 TABLET ORAL DAILY
Qty: 30 TABLET | Refills: 0 | Status: SHIPPED | OUTPATIENT
Start: 2019-10-10 | End: 2019-10-15 | Stop reason: HOSPADM

## 2019-10-10 RX ORDER — PREDNISONE 20 MG/1
40 TABLET ORAL DAILY
Status: DISCONTINUED | OUTPATIENT
Start: 2019-10-10 | End: 2019-10-10 | Stop reason: HOSPADM

## 2019-10-10 RX ADMIN — IPRATROPIUM BROMIDE AND ALBUTEROL SULFATE 3 ML: 2.5; .5 SOLUTION RESPIRATORY (INHALATION) at 07:11

## 2019-10-10 RX ADMIN — APIXABAN 5 MG: 5 TABLET, FILM COATED ORAL at 09:41

## 2019-10-10 RX ADMIN — PANTOPRAZOLE SODIUM 40 MG: 40 TABLET, DELAYED RELEASE ORAL at 05:00

## 2019-10-10 RX ADMIN — AZITHROMYCIN 500 MG: 250 TABLET, FILM COATED ORAL at 09:40

## 2019-10-10 RX ADMIN — FUROSEMIDE 20 MG: 20 TABLET ORAL at 09:41

## 2019-10-10 RX ADMIN — ACETAMINOPHEN 650 MG: 325 TABLET ORAL at 09:40

## 2019-10-10 RX ADMIN — GUAIFENESIN 600 MG: 600 TABLET, EXTENDED RELEASE ORAL at 09:42

## 2019-10-10 RX ADMIN — DILTIAZEM HYDROCHLORIDE 180 MG: 180 CAPSULE, COATED, EXTENDED RELEASE ORAL at 09:41

## 2019-10-10 RX ADMIN — PREDNISONE 40 MG: 20 TABLET ORAL at 09:40

## 2019-10-10 NOTE — DISCHARGE SUMMARY
Discharge- Ania Peña 1935, 82 y o  female MRN: [id number]    Unit/Bed#: -01 Encounter: [id number]    Primary Care Provider: Opal Bashir MD   Date and time admitted to hospital: 10/9/2019  6:21 AM        Chronic respiratory failure with [facility] ([facility] )  Assessment & Plan  · On 5 L nasal cannula at home which we will continue  · Of note, patient only intermittently compliant with her home oxygen  I stressed compliance with keeping her oxygen on    Chronic atrial fibrillation  Assessment & Plan  · Continue home regimen of Cardizem and Eliquis    Chronic low back pain  Assessment & Plan  · PT/OT  · Symptoms improved likely related to the steroids she is receiving from her COPD exacerbation  · PT/OT is recommending short-term skilled PT, however, patient is refusing any placement and wants to go home  She understands the risks of going home in spite of her fall risk  · We will have case management assist in setting up home PT    Chronic anxiety  Assessment & Plan  · Continue alprazolam home regimen  · Patient refusing to see Psychiatry    * COPD with acute exacerbation ([facility] )  Assessment & Plan  · COPD with acute exacerbation  · Symptoms improved  · Will discharge on prednisone taper and close outpatient follow-up  · I had a lengthy discussion with the patient regarding her frequent readmissions  She is refusing any type of placement and simply wants to go home  Physical therapy is recommending short-term rehab, however, she is refusing to go    · Case Management making appropriate arrangements for help at home        Discharging Physician / Practitioner: Maria Guadalupe Acosta MD  PCP: Opal Bashir MD  Admission Date:   Admission Orders (From admission, onward)     Ordered        10/09/19 0743  Place in Observation (expected length of stay for this patient is less than two midnights)  Once                   Discharge Date: 10/10/19    Resolved Problems  Date Reviewed: 10/10/2019    None Consultations During Hospital Stay:  · PT/OT    Procedures Performed:   · n/a    Significant Findings / Test Results:   · n/a    Incidental Findings:   · n/a     Test Results Pending at Discharge (will require follow up):   · n/a     Outpatient Tests Requested:  · CBC, CMP in 1 week    Complications:     n/a    Reason for Admission:  Back pain    Hospital Course:     Amina Agarwal is a 82 y o  female patient who originally presented to the hospital on 10/9/2019 due to back pain and shortness of breath  Patient was recently discharged from the hospital from a new atrial fibrillation diagnosis  She was home less than a day, when she presented again to the emergency department with back pain and shortness of breath  She was felt to have a mild COPD exacerbation and was admitted to the hospital for steroids and nebulizers  Patient's symptoms improved and she returned to baseline within 24 hours  Of note, this is the patient's 3rd admission in the past 2 weeks  I had a lengthy discussion with the patient regarding her home situation and asked her to consider placement to nursing facility  She is complaining of back pain and difficulty ambulating at home related to her sciatica  Patient adamantly refused any placement and insisted upon going home, as she did during her previous admissions as well  Physical and Occupational therapy have seen the patient and her recommending short-term rehab, however, patient is refusing  It should also be noted that the patient has at times been noncompliant with her medications and oxygen at home  I did suggest having Psychiatry see the patient, however, patient refused to be seen by Psychiatry  As patient was alert and oriented x3 and capable of making her own medical decisions, she was deemed stable to be discharged home as per her wishes    Aside from her new atrial fibrillation medications which we had given her during her previous admission including Eliquis and diltiazem, I will be prescribing prednisone taper for her as well and have encouraged her to follow up closely as an outpatient  Please see above list of diagnoses and related plan for additional information  Condition at Discharge: stable     Discharge Day Visit / Exam:     Subjective:  Patient seen examined  Patient was to go home  Vitals: Blood Pressure: 106/58 (10/10/19 0726)  Pulse: 91 (10/10/19 0726)  Temperature: 98 3 °F (36 8 °C) (10/10/19 0726)  Temp Source: Tympanic (10/10/19 0726)  Respirations: 22 (10/10/19 0726)  Height: 5' (152 4 cm) (10/09/19 1813)  Weight - Scale: 77 3 kg (170 lb 6 7 oz) (10/09/19 0821)  SpO2: 97 % (10/10/19 0726)  Exam:   Physical Exam   Constitutional: She is oriented to person, place, and time  She appears well-developed and well-nourished  HENT:   Head: Normocephalic and atraumatic  Eyes: Pupils are equal, round, and reactive to light  EOM are normal    Neck: Neck supple  Cardiovascular: Normal rate and regular rhythm  Pulmonary/Chest: Effort normal  She has no wheezes  Poor air movement   Abdominal: Soft  Bowel sounds are normal    Obese   Musculoskeletal: Normal range of motion  She exhibits no edema  Neurological: She is alert and oriented to person, place, and time  Skin: Skin is warm  Capillary refill takes less than 2 seconds  Psychiatric: She has a normal mood and affect  Her behavior is normal  Judgment and thought content normal    Nursing note and vitals reviewed  Discussion with Family:  Patient did not want me to call any family members    Discharge instructions/Information to patient and family:   See after visit summary for information provided to patient and family  Provisions for Follow-Up Care:  See after visit summary for information related to follow-up care and any pertinent home health orders        Disposition:     Home    For Discharges to [facility] SNF:   · Not Applicable to this Patient - Not Applicable to this Patient    Planned Readmission:    n/a     Discharge Statement:  I spent 35 minutes discharging the patient  This time was spent on the day of discharge  I had direct contact with the patient on the day of discharge  Greater than 50% of the total time was spent examining patient, answering all patient questions, arranging and discussing plan of care with patient as well as directly providing post-discharge instructions  Additional time then spent on discharge activities  Discharge Medications:  See after visit summary for reconciled discharge medications provided to patient and family        ** Please Note: This note has been constructed using a voice recognition system **

## 2019-10-10 NOTE — ASSESSMENT & PLAN NOTE
The documentation recorded by the scribe accurately and completely reflects the service(s) I personally performed and the decisions made by me.        · Continue alprazolam home regimen  · Patient refusing to see Psychiatry

## 2019-10-10 NOTE — SOCIAL WORK
Chart reviewed by case management, assessment was completed at the bedside, I did call the poa yesterday at 15:15 to return her message and I was unable to reach and had to leave a message, I spoke with Tamra Redding at 11:25 am today to discuss d/c plan and d/c needs  She was made aware the pt is here as an obs and is being d/c today, pt was also made aware that she was here as an obs and obs booklet was given, the pt lives alone in a 1 story home no inside steps, 3 steps outside, pt made it very clear I MAY NOT her children and she did give permission to speak to only Tamra Redding the [employer], pt works as a  and she states she cannot wear the oxygen on the bus, this is not true, I spoke to Tamra Redding and they can have oxygen she just needs a not e from the doctor stating she needs oxygen, I called [facility] because the pt stated she wanted an inogen, I spoke to [facility] and the rx she has is for 2 liters continuous and the pt has a portable concentrator that needs to be set a t 3 liters, I made the pt aware of this information, pt has not been wearing her oxygen when she is at work and she was made aware oxygen is allowed on the bus, pt dept assessed the pt and she is declined snf rehab, she is agreeable to [facility], I explained she is working and would need outpt therapy, she does not drive, she stated she is going to be off work for a short time, I stated I would make a referral but I made her aware she my not qualify ,, A post acute care recommendation was made by your care team for  SNF pt refused and requested [facility]  Discussed Freedom of Choice with patient  List of agencies given to patient via in person  patient aware the list is custom filtered for them by zip code location and that [facility] post acute providers are designated   Pt's 1st choice was [facility] and they would not be able to see the pt until 10/13/19 and her 2nd choice was [facility] and referral was made and she was accepted and d/c instructions were faxed,pt's friend Lionel Rousseau will transport the pt home, Radha Darby will  her medications, pt dept recommended a walker, I asked the pt if she had one and she stated no, I told her I could order a walker from [place] where she has her oxygen or I have walkers here from [employer] and she stated " GIVE ME the one you have" walker was issued to the pt , I made the pt aware of "independt living home" katie Sujey Bravo from [facility] runs and I made Radha Darby aware also, it is an apartment that you share and there are private rooms and some you share, $700-$900/month pt is respoinsibel for phone ,meds and food, Radha Darby liked the idea and I made her aware I would give them the phone# and I would give Torri Yun the pt# and she will call when a room is open, pt stated I could give the # and she may consider but not now, pt denied any additional d/c needs, out pt cm referral was made for this pt, pt and POA in agreement with the d/c and d/c plan home with [facility]

## 2019-10-10 NOTE — ASSESSMENT & PLAN NOTE
· PT/OT  · Symptoms improved likely related to the steroids she is receiving from her COPD exacerbation  · PT/OT is recommending short-term skilled PT, however, patient is refusing any placement and wants to go home  She understands the risks of going home in spite of her fall risk    · We will have case management assist in setting up home PT

## 2019-10-10 NOTE — ASSESSMENT & PLAN NOTE
· COPD with acute exacerbation  · Symptoms improved  · Will discharge on prednisone taper and close outpatient follow-up  · I had a lengthy discussion with the patient regarding her frequent readmissions  She is refusing any type of placement and simply wants to go home  Physical therapy is recommending short-term rehab, however, she is refusing to go    · Case Management making appropriate arrangements for help at home

## 2019-10-10 NOTE — ASSESSMENT & PLAN NOTE
· On 5 L nasal cannula at home which we will continue  · Of note, patient only intermittently compliant with her home oxygen    I stressed compliance with keeping her oxygen on

## 2019-10-10 NOTE — PLAN OF CARE
Problem: DISCHARGE PLANNING - CARE MANAGEMENT  Goal: Discharge to post-acute care or home with appropriate resources  Description  INTERVENTIONS:  - Conduct assessment to determine patient/family and health care team treatment goals, and need for post-acute services based on payer coverage, community resources, and patient preferences, and barriers to discharge  - Address psychosocial, clinical, and financial barriers to discharge as identified in assessment in conjunction with the patient/family and health care team  - Arrange appropriate level of post-acute services according to patient's   needs and preference and payer coverage in collaboration with the physician and health care team  - Communicate with and update the patient/family, physician, and health care team regarding progress on the discharge plan  - Arrange appropriate transportation to post-acute venues    Pt's goal is to return home with [facility]   Outcome: Completed

## 2019-10-10 NOTE — PHYSICAL THERAPY NOTE
Physical Therapy Treatment Session    Patient's Name: Jenni Garcia    Admitting Diagnosis  Shortness of breath [R06 02]  COPD [facility]ation [facility] [J44 1]    Problem List  Patient Active Problem List   Diagnosis    Rectal bleed    Asthma    Cardiac disease    Diverticulosis of intestine with bleeding    Functional diarrhea    Colorectal polyps    Dyspnea    Effusion of bursa of left elbow    Cardiomegaly    Chronic anxiety    Chronic cystitis    Chronic low back pain    Congestive heart failure [facility]    Deviated nasal septum    Diverticulosis of colon    Gastroesophageal reflux disease without esophagitis    Gout    Herpes zoster    History of angioedema    History of colonic polyps    Lower gastrointestinal hemorrhage    Lumbar radiculopathy    Macular degeneration of both eyes    Obesity    Osteoporosis    Other specified forms of chronic ischemic heart disease    Seasonal allergies    Panic attack    Vocal cord dysfunction    Colitis presumed infectious    Chronic atrial fibrillation    Left elbow pain    Chronic respiratory failure with hypoxia ([facility]       Past Medical History  Past Medical History:   Diagnosis Date    A[facility]fib [facility]     Asthma     Blurred vision     Cardiac disease     COPD (chronic obstructive pulmonary disease) (HCC)     Diverticulosis     Emphysema lung ([facility] )        Past Surgical History  Past Surgical History:   Procedure Laterality Date    COLON SURGERY      COLONOSCOPY W/ POLYPECTOMY N/A 1/21/2019    Procedure: COLONOSCOPY W/ POLYPECTOMY;  Surgeon: Ermelinda Villasenor MD;  Location: [facility] GI [facility];   Service: General        10/10/19 4347   Pain Assessment   Pain Assessment No/denies pain   Pain Score No Pain   Clinical Progression Not changed   Restrictions/Precautions   Weight Bearing Precautions Per Order No   Other Precautions O2;Fall Risk  (3L o2)   General   Chart Reviewed Yes   Cognition   Overall Cognitive Status [facility] Arousal/Participation Alert; Responsive; Uncooperative   Attention Within functional limits   Orientation Level Oriented X4   Memory Within functional limits   Following Commands Follows one step commands with increased time or repetition   Subjective   Subjective Pt was ready to go home  Transfers   Sit to Stand 4  Minimal assistance   Additional items Assist x 1;Verbal cues; Increased time required;Armrests   Stand to Sit 4  Minimal assistance   Additional items Assist x 1; Armrests; Increased time required;Verbal cues   Toilet transfer 4  Minimal assistance   Additional items Assist x 1;Verbal cues;Standard toilet; Increased time required   Ambulation/Elevation   Gait pattern Narrow MIGUEL ÁNGEL; Decreased foot clearance; Short stride; Inconsistent uri   Gait Assistance   (CGA w/ rw)   Additional items Verbal cues; Tactile cues   Assistive Device Rolling walker   Distance 300 feet   Stair Management Assistance Not tested   Balance   Static Sitting Fair +   Dynamic Sitting Fair +   Static Standing Fair +   Dynamic Standing Fair +   Ambulatory Fair   Endurance Deficit   Endurance Deficit Yes   Endurance Deficit Description   (SOB upon mobilty, resolved with rest )   Activity Tolerance   Activity Tolerance Patient limited by fatigue   Exercises   Hip Abduction Sitting;25 reps;AROM; Bilateral   Hip Adduction Sitting;25 reps;AROM; Bilateral   Knee AROM Short Arc Quad Sitting;25 reps;AROM; Bilateral   Ankle Pumps Sitting;25 reps;AROM; Bilateral   Neuro re-ed X 10 minutes including: standing multi-directional head turns 25 reps each w/ supervision of 1 and BUE support on RW, reaching x 25 reps w/ supervision of 1 and BUE support of RW and continued postural facilitation to increase stability & safety  Assessment   Prognosis Fair   Problem List Decreased strength;Decreased endurance; Impaired balance;Decreased mobility; Impaired judgement;Decreased safety awareness; Obesity   Assessment Pt seen for PT treatment session this date with interventions consisting of gait training w/ emphasis on improving pt's ability to ambulate level surfaces x 300 feet with CGA provided by therapist with RW, Therapeutic exercise consisting of: AROM 25 B LE in sitting position and neuromuscular re-education: multi-directional head turn and BUE reaching cross midline x 25 reps ft w/ B UE support  Pt agreeable to PT treatment session upon arrival, pt found seated OOB in chair, A&O x 4  In comparison to previous session, pt with improvements in Gait and endurance  Post session: pt returned back to recliner and all needs in reach Continue to recommend Home PT at time of d/c in order to maximize pt's functional independence and safety w/ mobility  Pt continues to be functioning below baseline level, and remains limited 2* factors listed above and including Impaired balance, gait dysfunction, decreased endurance  PT will continue to see pt while here in order to address the deficits listed above and provide interventions consistent w/ POC in effort to achieve LTGs  Barriers to Discharge None   Goals   Patient Goals Go home today   LTG Expiration Date 10/19/19   Long Term Goal #1 LTG remain appropriate    PT Treatment Day 2   Plan   Treatment/Interventions Functional transfer training;LE strengthening/ROM; Therapeutic exercise; Endurance training;Gait training; Compensatory technique education;Spoke to case management  (and neuro re-ed with balance training )   Progress Improving as expected   PT Frequency Other (Comment)  (3-5 x/wk)   Recommendation   Recommendation Home PT   Equipment Recommended Walker   PT - OK to Discharge Yes  (if medically stable )   Additional Comments Pt was resting in chair after session with all needs within reach           Eric Ramos, PT

## 2019-10-10 NOTE — PLAN OF CARE
Problem: PHYSICAL THERAPY ADULT  Goal: Performs mobility at highest level of function for planned discharge setting  See evaluation for individualized goals  Description  Treatment/Interventions: Functional transfer training, LE strengthening/ROM, Therapeutic exercise, Endurance training, Gait training, Compensatory technique education, Spoke to case management(and neuro re-ed with balance training )  Equipment Recommended: Refugio Scripture       See flowsheet documentation for full assessment, interventions and recommendations  Outcome: Progressing  Note:   Prognosis: Fair  Problem List: Decreased strength, Decreased endurance, Impaired balance, Decreased mobility, Impaired judgement, Decreased safety awareness, Obesity  Assessment: Pt seen for PT treatment session this date with interventions consisting of gait training w/ emphasis on improving pt's ability to ambulate level surfaces x 300 feet with CGA provided by therapist with RW, Therapeutic exercise consisting of: AROM 25 B LE in sitting position and neuromuscular re-education: multi-directional head turn and BUE reaching cross midline x 25 reps ft w/ B UE support  Pt agreeable to PT treatment session upon arrival, pt found seated OOB in chair, A&O x 4  In comparison to previous session, pt with improvements in Gait and endurance  Post session: pt returned back to recliner and all needs in reach Continue to recommend Home PT at time of d/c in order to maximize pt's functional independence and safety w/ mobility  Pt continues to be functioning below baseline level, and remains limited 2* factors listed above and including Impaired balance, gait dysfunction, decreased endurance  PT will continue to see pt while here in order to address the deficits listed above and provide interventions consistent w/ POC in effort to achieve LTGs    Barriers to Discharge: None  Barriers to Discharge Comments: [facility] home, pt  lives alone  Recommendation: Home PT     PT - OK to Discharge: Yes(if medically stable )    See flowsheet documentation for full assessment

## 2019-10-10 NOTE — PLAN OF CARE
Problem: Potential for Falls  Goal: Patient will remain free of falls  Description  INTERVENTIONS:  - Assess patient frequently for physical needs  -  Identify cognitive and physical deficits and behaviors that affect risk of falls    -  [place] fall precautions as indicated by assessment   - Educate patient/family on patient safety including physical limitations  - Instruct patient to call for assistance with activity based on assessment  - Modify environment to reduce risk of injury  - Consider OT/PT consult to assist with strengthening/mobility  Outcome: Adequate for Discharge     Problem: RESPIRATORY - ADULT  Goal: Achieves optimal ventilation and oxygenation  Description  INTERVENTIONS:  - Assess for changes in respiratory status  - Assess for changes in mentation and behavior  - Position to facilitate oxygenation and minimize respiratory effort  - Oxygen administered by appropriate delivery if ordered  - Initiate smoking cessation education as indicated  - Encourage broncho-pulmonary hygiene including cough, deep breathe, Incentive Spirometry  - Assess the need for suctioning and aspirate as needed  - Assess and instruct to report SOB or any respiratory difficulty  - Respiratory Therapy support as indicated  Outcome: Adequate for Discharge

## 2019-10-11 ENCOUNTER — HOSPITAL ENCOUNTER (INPATIENT)
Facility: HOSPITAL | Age: 84
LOS: 4 days | Discharge: NURSING, REHAB OR LONG-TERM CARE | DRG: 309 | End: 2019-10-15
Attending: INTERNAL MEDICINE | Admitting: INTERNAL MEDICINE
Payer: MEDICARE

## 2019-10-11 DIAGNOSIS — E86.0 DEHYDRATION: ICD-10-CM

## 2019-10-11 DIAGNOSIS — I48.91 ATRIAL FIBRILLATION WITH RAPID VENTRICULAR RESPONSE (HCC): Primary | ICD-10-CM

## 2019-10-11 DIAGNOSIS — J41.0 SIMPLE CHRONIC BRONCHITIS (HCC): Chronic | ICD-10-CM

## 2019-10-11 DIAGNOSIS — K59.1 FUNCTIONAL DIARRHEA: ICD-10-CM

## 2019-10-11 DIAGNOSIS — R19.7 PROTRACTED DIARRHEA: ICD-10-CM

## 2019-10-11 PROBLEM — R53.81 DEBILITY: Status: ACTIVE | Noted: 2019-10-11

## 2019-10-11 PROBLEM — F41.9 CHRONIC ANXIETY: Chronic | Status: ACTIVE | Noted: 2018-01-23

## 2019-10-11 PROBLEM — J45.909 ASTHMA: Chronic | Status: RESOLVED | Noted: 2018-12-28 | Resolved: 2019-10-11

## 2019-10-11 PROBLEM — K62.5 RECTAL BLEED: Chronic | Status: RESOLVED | Noted: 2018-12-28 | Resolved: 2019-10-11

## 2019-10-11 PROBLEM — I48.20 CHRONIC ATRIAL FIBRILLATION (HCC): Chronic | Status: ACTIVE | Noted: 2019-10-06

## 2019-10-11 PROBLEM — E66.9 OBESITY: Chronic | Status: ACTIVE | Noted: 2018-02-28

## 2019-10-11 PROBLEM — G89.29 CHRONIC LOW BACK PAIN: Chronic | Status: ACTIVE | Noted: 2018-08-13

## 2019-10-11 PROBLEM — J96.11 CHRONIC RESPIRATORY FAILURE WITH HYPOXIA (HCC): Chronic | Status: ACTIVE | Noted: 2019-10-07

## 2019-10-11 PROBLEM — M54.50 CHRONIC LOW BACK PAIN: Chronic | Status: ACTIVE | Noted: 2018-08-13

## 2019-10-11 LAB
ALBUMIN SERPL BCP-MCNC: 3.6 G/DL (ref 3.5–5.7)
ALP SERPL-CCNC: 71 U/L (ref 55–165)
ALT SERPL W P-5'-P-CCNC: 32 U/L (ref 7–52)
ANION GAP SERPL CALCULATED.3IONS-SCNC: 9 MMOL/L (ref 4–13)
APTT PPP: 28 SECONDS (ref 23–37)
AST SERPL W P-5'-P-CCNC: 14 U/L (ref 13–39)
ATRIAL RATE: 87 BPM
BACTERIA BLD CULT: NORMAL
BACTERIA BLD CULT: NORMAL
BACTERIA UR QL AUTO: ABNORMAL /HPF
BASOPHILS # BLD AUTO: 0 THOUSANDS/ΜL (ref 0–0.1)
BASOPHILS NFR BLD AUTO: 0 % (ref 0–2)
BILIRUB SERPL-MCNC: 0.4 MG/DL (ref 0.2–1)
BILIRUB UR QL STRIP: NEGATIVE
BUN SERPL-MCNC: 33 MG/DL (ref 7–25)
CALCIUM SERPL-MCNC: 8.7 MG/DL (ref 8.6–10.5)
CHLORIDE SERPL-SCNC: 98 MMOL/L (ref 98–107)
CLARITY UR: CLEAR
CO2 SERPL-SCNC: 27 MMOL/L (ref 21–31)
COLOR UR: YELLOW
CREAT SERPL-MCNC: 0.67 MG/DL (ref 0.6–1.2)
EOSINOPHIL # BLD AUTO: 0 THOUSAND/ΜL (ref 0–0.61)
EOSINOPHIL NFR BLD AUTO: 0 % (ref 0–5)
ERYTHROCYTE [DISTWIDTH] IN BLOOD BY AUTOMATED COUNT: 14.6 % (ref 11.5–14.5)
GFR SERPL CREATININE-BSD FRML MDRD: 82 ML/MIN/1.73SQ M
GLUCOSE SERPL-MCNC: 200 MG/DL (ref 65–99)
GLUCOSE UR STRIP-MCNC: NEGATIVE MG/DL
HCT VFR BLD AUTO: 33.6 % (ref 42–47)
HGB BLD-MCNC: 11 G/DL (ref 12–16)
HGB UR QL STRIP.AUTO: ABNORMAL
INR PPP: 1.34 (ref 0.9–1.5)
KETONES UR STRIP-MCNC: NEGATIVE MG/DL
LACTATE SERPL-SCNC: 1.5 MMOL/L (ref 0.5–2)
LACTATE SERPL-SCNC: 2 MMOL/L (ref 0.5–2)
LEUKOCYTE ESTERASE UR QL STRIP: ABNORMAL
LYMPHOCYTES # BLD AUTO: 1.7 THOUSANDS/ΜL (ref 0.6–4.47)
LYMPHOCYTES NFR BLD AUTO: 14 % (ref 21–51)
MCH RBC QN AUTO: 28.2 PG (ref 26–34)
MCHC RBC AUTO-ENTMCNC: 32.8 G/DL (ref 31–37)
MCV RBC AUTO: 86 FL (ref 81–99)
MONOCYTES # BLD AUTO: 1 THOUSAND/ΜL (ref 0.17–1.22)
MONOCYTES NFR BLD AUTO: 8 % (ref 2–12)
NEUTROPHILS # BLD AUTO: 9.9 THOUSANDS/ΜL (ref 1.4–6.5)
NEUTS SEG NFR BLD AUTO: 79 % (ref 42–75)
NITRITE UR QL STRIP: NEGATIVE
NON-SQ EPI CELLS URNS QL MICRO: ABNORMAL /HPF
OTHER STN SPEC: ABNORMAL
P AXIS: 86 DEGREES
PH UR STRIP.AUTO: 6.5 [PH]
PLATELET # BLD AUTO: 272 THOUSANDS/UL (ref 149–390)
PMV BLD AUTO: 7.1 FL (ref 8.6–11.7)
POTASSIUM SERPL-SCNC: 3.8 MMOL/L (ref 3.5–5.5)
PR INTERVAL: 188 MS
PROT SERPL-MCNC: 6.4 G/DL (ref 6.4–8.9)
PROT UR STRIP-MCNC: NEGATIVE MG/DL
PROTHROMBIN TIME: 15.6 SECONDS (ref 10.2–13)
QRS AXIS: -13 DEGREES
QRSD INTERVAL: 84 MS
QT INTERVAL: 328 MS
QTC INTERVAL: 420 MS
RBC # BLD AUTO: 3.91 MILLION/UL (ref 3.9–5.2)
RBC #/AREA URNS AUTO: ABNORMAL /HPF
SODIUM SERPL-SCNC: 134 MMOL/L (ref 134–143)
SP GR UR STRIP.AUTO: <=1.005 (ref 1–1.03)
T WAVE AXIS: 79 DEGREES
UROBILINOGEN UR QL STRIP.AUTO: 0.2 E.U./DL
VENTRICULAR RATE: 99 BPM
WBC # BLD AUTO: 12.6 THOUSAND/UL (ref 4.8–10.8)
WBC #/AREA URNS AUTO: ABNORMAL /HPF

## 2019-10-11 PROCEDURE — 96365 THER/PROPH/DIAG IV INF INIT: CPT

## 2019-10-11 PROCEDURE — 96376 TX/PRO/DX INJ SAME DRUG ADON: CPT

## 2019-10-11 PROCEDURE — 36415 COLL VENOUS BLD VENIPUNCTURE: CPT | Performed by: INTERNAL MEDICINE

## 2019-10-11 PROCEDURE — 93005 ELECTROCARDIOGRAM TRACING: CPT

## 2019-10-11 PROCEDURE — 85730 THROMBOPLASTIN TIME PARTIAL: CPT | Performed by: INTERNAL MEDICINE

## 2019-10-11 PROCEDURE — 85610 PROTHROMBIN TIME: CPT | Performed by: INTERNAL MEDICINE

## 2019-10-11 PROCEDURE — 87040 BLOOD CULTURE FOR BACTERIA: CPT | Performed by: INTERNAL MEDICINE

## 2019-10-11 PROCEDURE — 81001 URINALYSIS AUTO W/SCOPE: CPT | Performed by: INTERNAL MEDICINE

## 2019-10-11 PROCEDURE — 80053 COMPREHEN METABOLIC PANEL: CPT | Performed by: INTERNAL MEDICINE

## 2019-10-11 PROCEDURE — 85025 COMPLETE CBC W/AUTO DIFF WBC: CPT | Performed by: INTERNAL MEDICINE

## 2019-10-11 PROCEDURE — 99285 EMERGENCY DEPT VISIT HI MDM: CPT

## 2019-10-11 PROCEDURE — 93010 ELECTROCARDIOGRAM REPORT: CPT | Performed by: INTERNAL MEDICINE

## 2019-10-11 PROCEDURE — 99223 1ST HOSP IP/OBS HIGH 75: CPT | Performed by: PHYSICIAN ASSISTANT

## 2019-10-11 PROCEDURE — 83605 ASSAY OF LACTIC ACID: CPT | Performed by: INTERNAL MEDICINE

## 2019-10-11 PROCEDURE — 81003 URINALYSIS AUTO W/O SCOPE: CPT | Performed by: INTERNAL MEDICINE

## 2019-10-11 RX ORDER — FUROSEMIDE 20 MG/1
20 TABLET ORAL DAILY
Status: DISCONTINUED | OUTPATIENT
Start: 2019-10-12 | End: 2019-10-13

## 2019-10-11 RX ORDER — ALPRAZOLAM 0.5 MG/1
0.5 TABLET ORAL
Status: DISCONTINUED | OUTPATIENT
Start: 2019-10-11 | End: 2019-10-15 | Stop reason: HOSPADM

## 2019-10-11 RX ORDER — POTASSIUM CHLORIDE 750 MG/1
10 TABLET, EXTENDED RELEASE ORAL ONCE
Status: COMPLETED | OUTPATIENT
Start: 2019-10-11 | End: 2019-10-12

## 2019-10-11 RX ORDER — PREDNISONE 20 MG/1
40 TABLET ORAL DAILY
Status: DISCONTINUED | OUTPATIENT
Start: 2019-10-12 | End: 2019-10-12

## 2019-10-11 RX ORDER — ONDANSETRON 2 MG/ML
4 INJECTION INTRAMUSCULAR; INTRAVENOUS EVERY 6 HOURS PRN
Status: DISCONTINUED | OUTPATIENT
Start: 2019-10-11 | End: 2019-10-15 | Stop reason: HOSPADM

## 2019-10-11 RX ORDER — DILTIAZEM HYDROCHLORIDE 60 MG/1
60 TABLET, FILM COATED ORAL EVERY 6 HOURS SCHEDULED
Status: DISCONTINUED | OUTPATIENT
Start: 2019-10-12 | End: 2019-10-12

## 2019-10-11 RX ORDER — DILTIAZEM HYDROCHLORIDE 180 MG/1
180 CAPSULE, COATED, EXTENDED RELEASE ORAL DAILY
Status: DISCONTINUED | OUTPATIENT
Start: 2019-10-12 | End: 2019-10-11

## 2019-10-11 RX ORDER — DILTIAZEM HYDROCHLORIDE 5 MG/ML
15 INJECTION INTRAVENOUS ONCE
Status: COMPLETED | OUTPATIENT
Start: 2019-10-11 | End: 2019-10-11

## 2019-10-11 RX ORDER — ACETAMINOPHEN 325 MG/1
650 TABLET ORAL EVERY 4 HOURS PRN
Status: DISCONTINUED | OUTPATIENT
Start: 2019-10-11 | End: 2019-10-15 | Stop reason: HOSPADM

## 2019-10-11 RX ORDER — FAMOTIDINE 20 MG/1
40 TABLET, FILM COATED ORAL DAILY
Status: DISCONTINUED | OUTPATIENT
Start: 2019-10-12 | End: 2019-10-15 | Stop reason: HOSPADM

## 2019-10-11 RX ORDER — SODIUM CHLORIDE 9 MG/ML
75 INJECTION, SOLUTION INTRAVENOUS CONTINUOUS
Status: DISCONTINUED | OUTPATIENT
Start: 2019-10-11 | End: 2019-10-12

## 2019-10-11 RX ORDER — DILTIAZEM HYDROCHLORIDE 180 MG/1
180 CAPSULE, COATED, EXTENDED RELEASE ORAL DAILY
Status: DISCONTINUED | OUTPATIENT
Start: 2019-10-11 | End: 2019-10-11

## 2019-10-11 RX ADMIN — DILTIAZEM HYDROCHLORIDE 15 MG: 5 INJECTION INTRAVENOUS at 19:42

## 2019-10-11 RX ADMIN — DILTIAZEM HYDROCHLORIDE 5 MG/HR: 5 INJECTION INTRAVENOUS at 20:11

## 2019-10-11 RX ADMIN — SODIUM CHLORIDE 1500 ML: 0.9 INJECTION, SOLUTION INTRAVENOUS at 21:06

## 2019-10-11 RX ADMIN — SODIUM CHLORIDE 75 ML/HR: 9 INJECTION, SOLUTION INTRAVENOUS at 23:35

## 2019-10-11 RX ADMIN — SODIUM CHLORIDE 1000 ML: 0.9 INJECTION, SOLUTION INTRAVENOUS at 19:44

## 2019-10-11 NOTE — SOCIAL WORK
Message left on case management phone for someone to call Sammy Rowley at [phone] re: patient  Chencho Castillo called at [phone]  States patient now wants rehab and does not feel comfortable at home by herself  Patient discharged yesterday with [facility]  They are going to patient home [phone] today  Patient was here as observation status and does not have a 3 day qualifying stay for snf rehab  BRISSA Dickey informed [facility] should be able to assist in rehab  She is going to patient's home at this time  Made aware of referral to ARU option

## 2019-10-11 NOTE — ED PROVIDER NOTES
History  Chief Complaint   Patient presents with    Diarrhea    Weakness - Generalized    Back Pain     A 82year-old chronically ill, with chronic hypoxic respiratory failure presents with prolific diarrhea today  She has been to the emergency room 4 times in the last 10 days  Admitted 3 of those times  Encouraged to go to a nursing home for rehabilitative purposes but she refused this  Return home only to have this diarrhea all day  Home healthcare was in as well as home physical therapy, she was too weak to participate in therapy at that time  She appears ill, unable to keep her medicines down all day due to weakness nausea in the diarrhea  Everything she put in her came right out she states  She is here with family  She is agreeable to stay in the nursing home now, realizing she just can't do this at home right now  She has no fever  She has had antibiotics recently, and has mild abdominal discomfort  She still has low back pain and right buttock pain, still has a chronic shortness of breath and cough  She has underlying history of chronic AFib, chronic hypoxic respiratory failure, chronic back pain  Prior to Admission Medications   Prescriptions Last Dose Informant Patient Reported? Taking?    ALPRAZolam (XANAX) 0 5 mg tablet   Yes No   Sig: Take 0 5 mg by mouth daily at bedtime as needed   Albuterol Sulfate 108 (90 Base) MCG/ACT AEPB   Yes No   Sig: Inhale 2 puffs 3 (three) times a day as needed   Calcium Carb-Cholecalciferol (CALCIUM 1000 + D PO)   Yes No   Sig: Take 1,000 mg by mouth daily   alendronate (FOSAMAX) 70 mg tablet  Self Yes No   Sig: Take 70 mg by mouth every 7 days   apixaban (ELIQUIS) 5 mg   No No   Sig: Take 1 tablet (5 mg total) by mouth 2 (two) times a day   diltiazem (CARDIZEM CD) 180 mg 24 hr capsule   No No   Sig: Take 1 capsule (180 mg total) by mouth daily   famotidine (PEPCID) 40 MG tablet   Yes No   Sig: Take 40 mg by mouth as needed fluticasone-umeclidinium-vilanterol (TRELEGY ELLIPTA) 100-62 5-25 MCG/INH inhaler   Yes No   Si puff daily   furosemide (LASIX) 20 mg tablet  Self Yes No   Sig: Take 20 mg by mouth daily   ipratropium-albuterol (COMBIVENT RESPIMAT) inhaler   Yes No   Sig: Every 6 hours   montelukast (SINGULAIR) 10 mg tablet  Self Yes No   Sig: Take 10 mg by mouth daily at bedtime   potassium chloride (MICRO-K) 10 MEQ CR capsule  Self Yes No   Sig: Take 10 mEq by mouth daily   predniSONE 20 mg tablet   No No   Sig: Take 2 tablets (40 mg total) by mouth daily [id number];[id number];[id number]      Facility-Administered Medications: None       Past Medical History:   Diagnosis Date    Asthma     Atrial fibrillation     Blurred vision     Cardiac disease     Colitis     COPD (chronic obstructive pulmonary disease)     Diverticulosis     Emphysema lung        Past Surgical History:   Procedure Laterality Date    COLON SURGERY      COLONOSCOPY W/ POLYPECTOMY N/A 2019    Procedure: COLONOSCOPY W/ POLYPECTOMY;  Surgeon: Kaley Prado MD;  Location: [facility] GI LAB; Service: General       History reviewed  No pertinent family history  I have reviewed and agree with the history as documented  Social History     Tobacco Use    Smoking status: Former Smoker     Last attempt to quit: 2013     Years since quittin 8    Smokeless tobacco: Never Used   Substance Use Topics    Alcohol use: Not Currently     Frequency: Never     Binge frequency: Never    Drug use: No        Review of Systems   Constitutional: Negative for chills and fever  HENT: Negative for rhinorrhea and sore throat  Eyes: Negative for visual disturbance  Respiratory: Positive for cough and shortness of breath  Cardiovascular: Positive for palpitations  Negative for chest pain and leg swelling  Gastrointestinal: Positive for abdominal pain and diarrhea  Negative for nausea and vomiting          Prolific diarrhea, mild abdominal discomfort   Genitourinary: Negative for dysuria  Musculoskeletal: Positive for back pain  Negative for myalgias  Skin: Negative for rash  Neurological: Positive for weakness and light-headedness  Negative for dizziness and headaches  Psychiatric/Behavioral: Negative for confusion  The patient is nervous/anxious  All other systems reviewed and are negative  Physical Exam  Physical Exam   Constitutional: She is oriented to person, place, and time  She appears well-developed and well-nourished  HENT:   Nose: Nose normal    Mouth/Throat: No oropharyngeal exudate  Dry oral mucosa   Eyes: Pupils are equal, round, and reactive to light  Conjunctivae and EOM are normal  No scleral icterus  Neck: Normal range of motion  Neck supple  No JVD present  No tracheal deviation present  Cardiovascular:   No murmur heard  AFib and rash with rapid ventricular response rates between 150 and 170   Pulmonary/Chest: No respiratory distress  She has no wheezes  She has no rales  Mildly tachypneic, diminished breath sounds   Abdominal: Soft  Bowel sounds are normal  There is no tenderness  There is guarding  Hyperactive bowel sounds   Musculoskeletal: Normal range of motion  She exhibits tenderness  She exhibits no edema  LS spine   Neurological: She is alert and oriented to person, place, and time  No cranial nerve deficit or sensory deficit  She exhibits normal muscle tone  5/5 motor, nl sens   Skin: Skin is warm and dry  There is pallor  Psychiatric: She has a normal mood and affect  Her behavior is normal    Nursing note and vitals reviewed        Vital Signs  ED Triage Vitals [10/11/19 1847]   Temperature Pulse Respirations Blood Pressure SpO2   98 2 °F (36 8 °C) 76 (!) 24 152/69 100 %      Temp Source Heart Rate Source Patient Position - Orthostatic VS BP Location FiO2 (%)   Temporal Monitor Lying Right arm --      Pain Score       Worst Possible Pain           Vitals:    10/11/19 1847 BP: 152/69   Pulse: 76   Patient Position - Orthostatic VS: Lying         Visual Acuity      ED Medications  Medications - No data to display    Diagnostic Studies  Results Reviewed     None                 No orders to display              Procedures  ECG 12 Lead Documentation Only  Date/Time: 10/11/2019 7:50 PM  Performed by: David Emmanuel DO  Authorized by: David Emmanuel DO     Indications / Diagnosis:  Weakness  ECG reviewed by me, the ED Provider: yes    Patient location:  ED  Previous ECG:     Previous ECG:  Compared to current    Similarity:  No change  Interpretation:     Interpretation: abnormal    Rate:     ECG rate:  148    ECG rate assessment: tachycardic    Rhythm:     Rhythm: atrial fibrillation    Ectopy:     Ectopy: none    QRS:     QRS axis:  Normal    QRS intervals:  Normal  ST segments:     ST segments:  Non-specific  T waves:     T waves: normal    Comments:      Low voltage           ED Course  ED Course as of Oct 11 2114   Fri Oct 11, 2019   2009 Patient has sepsis score that is going to 7 or 8, lactic acid started  Has IV fluids running, will continue for totaled 30 mL/kilos per ideal body weight      2059 Patient is currently comfortable, resting heart rate still running about 155  Remains on Cardizem IV       2113 Due to post antibiotic diarrhea, patient will need private room and evaluation of her for C diff is okay  MDM    Disposition  Final diagnoses:   None     ED Disposition     None      Follow-up Information    None         Patient's Medications   Discharge Prescriptions    No medications on file     No discharge procedures on file      ED Provider  Electronically Signed by           David Emmanuel DO  10/11/19 2114       David Emmanuel DO  10/11/19 2142

## 2019-10-12 PROBLEM — R84.5 POSITIVE CULTURE FINDINGS IN SPUTUM: Status: ACTIVE | Noted: 2019-10-12

## 2019-10-12 LAB
ANION GAP SERPL CALCULATED.3IONS-SCNC: 5 MMOL/L (ref 4–13)
BACTERIA SPT RESP CULT: ABNORMAL
BUN SERPL-MCNC: 19 MG/DL (ref 7–25)
CALCIUM SERPL-MCNC: 7.4 MG/DL (ref 8.6–10.5)
CHLORIDE SERPL-SCNC: 109 MMOL/L (ref 98–107)
CO2 SERPL-SCNC: 25 MMOL/L (ref 21–31)
CREAT SERPL-MCNC: 0.51 MG/DL (ref 0.6–1.2)
GFR SERPL CREATININE-BSD FRML MDRD: 90 ML/MIN/1.73SQ M
GLUCOSE SERPL-MCNC: 128 MG/DL (ref 65–99)
GRAM STN SPEC: ABNORMAL
MAGNESIUM SERPL-MCNC: 2.1 MG/DL (ref 1.9–2.7)
PHOSPHATE SERPL-MCNC: 2.1 MG/DL (ref 3–5.5)
POTASSIUM SERPL-SCNC: 3.6 MMOL/L (ref 3.5–5.5)
SODIUM SERPL-SCNC: 139 MMOL/L (ref 134–143)
WBC STL QL MICRO: NORMAL

## 2019-10-12 PROCEDURE — 87205 SMEAR GRAM STAIN: CPT | Performed by: PHYSICIAN ASSISTANT

## 2019-10-12 PROCEDURE — 94760 N-INVAS EAR/PLS OXIMETRY 1: CPT

## 2019-10-12 PROCEDURE — G8988 SELF CARE GOAL STATUS: HCPCS

## 2019-10-12 PROCEDURE — 80048 BASIC METABOLIC PNL TOTAL CA: CPT | Performed by: PHYSICIAN ASSISTANT

## 2019-10-12 PROCEDURE — 83735 ASSAY OF MAGNESIUM: CPT | Performed by: PHYSICIAN ASSISTANT

## 2019-10-12 PROCEDURE — 87493 C DIFF AMPLIFIED PROBE: CPT | Performed by: INTERNAL MEDICINE

## 2019-10-12 PROCEDURE — 99232 SBSQ HOSP IP/OBS MODERATE 35: CPT | Performed by: INTERNAL MEDICINE

## 2019-10-12 PROCEDURE — 87505 NFCT AGENT DETECTION GI: CPT | Performed by: PHYSICIAN ASSISTANT

## 2019-10-12 PROCEDURE — 94640 AIRWAY INHALATION TREATMENT: CPT

## 2019-10-12 PROCEDURE — G8987 SELF CARE CURRENT STATUS: HCPCS

## 2019-10-12 PROCEDURE — 84100 ASSAY OF PHOSPHORUS: CPT | Performed by: PHYSICIAN ASSISTANT

## 2019-10-12 PROCEDURE — 99222 1ST HOSP IP/OBS MODERATE 55: CPT | Performed by: INTERNAL MEDICINE

## 2019-10-12 PROCEDURE — 97166 OT EVAL MOD COMPLEX 45 MIN: CPT

## 2019-10-12 RX ORDER — CEFEPIME HYDROCHLORIDE 2 G/50ML
2000 INJECTION, SOLUTION INTRAVENOUS EVERY 12 HOURS
Status: DISCONTINUED | OUTPATIENT
Start: 2019-10-12 | End: 2019-10-12

## 2019-10-12 RX ORDER — ALBUTEROL SULFATE 90 UG/1
2 AEROSOL, METERED RESPIRATORY (INHALATION) EVERY 4 HOURS PRN
Status: DISCONTINUED | OUTPATIENT
Start: 2019-10-12 | End: 2019-10-12

## 2019-10-12 RX ORDER — IPRATROPIUM BROMIDE AND ALBUTEROL SULFATE 2.5; .5 MG/3ML; MG/3ML
3 SOLUTION RESPIRATORY (INHALATION)
Status: DISCONTINUED | OUTPATIENT
Start: 2019-10-12 | End: 2019-10-15 | Stop reason: HOSPADM

## 2019-10-12 RX ORDER — DILTIAZEM HYDROCHLORIDE 60 MG/1
60 TABLET, FILM COATED ORAL EVERY 6 HOURS SCHEDULED
Status: DISCONTINUED | OUTPATIENT
Start: 2019-10-12 | End: 2019-10-14

## 2019-10-12 RX ORDER — ALBUTEROL SULFATE 2.5 MG/3ML
2.5 SOLUTION RESPIRATORY (INHALATION) EVERY 4 HOURS PRN
Status: DISCONTINUED | OUTPATIENT
Start: 2019-10-12 | End: 2019-10-15 | Stop reason: HOSPADM

## 2019-10-12 RX ORDER — VANCOMYCIN HYDROCHLORIDE 1 G/200ML
15 INJECTION, SOLUTION INTRAVENOUS EVERY 12 HOURS
Status: DISCONTINUED | OUTPATIENT
Start: 2019-10-12 | End: 2019-10-12

## 2019-10-12 RX ADMIN — IPRATROPIUM BROMIDE AND ALBUTEROL SULFATE 3 ML: 2.5; .5 SOLUTION RESPIRATORY (INHALATION) at 07:47

## 2019-10-12 RX ADMIN — Medication 125 MG: at 01:56

## 2019-10-12 RX ADMIN — POTASSIUM CHLORIDE 10 MEQ: 750 TABLET, EXTENDED RELEASE ORAL at 01:49

## 2019-10-12 RX ADMIN — Medication 125 MG: at 13:27

## 2019-10-12 RX ADMIN — PREDNISONE 40 MG: 20 TABLET ORAL at 08:11

## 2019-10-12 RX ADMIN — DILTIAZEM HYDROCHLORIDE 60 MG: 60 TABLET, FILM COATED ORAL at 17:43

## 2019-10-12 RX ADMIN — Medication 125 MG: at 05:59

## 2019-10-12 RX ADMIN — FAMOTIDINE 40 MG: 20 TABLET ORAL at 08:11

## 2019-10-12 RX ADMIN — APIXABAN 5 MG: 5 TABLET, FILM COATED ORAL at 17:43

## 2019-10-12 RX ADMIN — FUROSEMIDE 20 MG: 20 TABLET ORAL at 08:14

## 2019-10-12 RX ADMIN — Medication 125 MG: at 17:43

## 2019-10-12 RX ADMIN — ACETAMINOPHEN 650 MG: 325 TABLET ORAL at 23:45

## 2019-10-12 RX ADMIN — DILTIAZEM HYDROCHLORIDE 30 MG: 30 TABLET, FILM COATED ORAL at 01:23

## 2019-10-12 RX ADMIN — IPRATROPIUM BROMIDE AND ALBUTEROL SULFATE 3 ML: 2.5; .5 SOLUTION RESPIRATORY (INHALATION) at 19:50

## 2019-10-12 RX ADMIN — ALBUTEROL SULFATE 2.5 MG: 2.5 SOLUTION RESPIRATORY (INHALATION) at 05:19

## 2019-10-12 RX ADMIN — Medication 125 MG: at 23:26

## 2019-10-12 RX ADMIN — DILTIAZEM HYDROCHLORIDE 60 MG: 60 TABLET, FILM COATED ORAL at 05:59

## 2019-10-12 RX ADMIN — APIXABAN 5 MG: 5 TABLET, FILM COATED ORAL at 08:11

## 2019-10-12 RX ADMIN — DILTIAZEM HYDROCHLORIDE 60 MG: 60 TABLET, FILM COATED ORAL at 13:27

## 2019-10-12 RX ADMIN — ACETAMINOPHEN 650 MG: 325 TABLET ORAL at 10:51

## 2019-10-12 NOTE — PLAN OF CARE
Problem: DISCHARGE PLANNING - CARE MANAGEMENT  Goal: Discharge to post-acute care or home with appropriate resources  Description  INTERVENTIONS:  - Conduct assessment to determine patient/family and health care team treatment goals, and need for post-acute services based on payer coverage, community resources, and patient preferences, and barriers to discharge  - Address psychosocial, clinical, and financial barriers to discharge as identified in assessment in conjunction with the patient/family and health care team  - Arrange appropriate level of post-acute services according to patient's   needs and preference and payer coverage in collaboration with the physician and health care team  - Communicate with and update the patient/family, physician, and health care team regarding progress on the discharge plan  - Arrange appropriate transportation to post-acute venues    Pt's goal is to return home after some rehab   Outcome: Progressing

## 2019-10-12 NOTE — RESPIRATORY THERAPY NOTE
RT Protocol Note  Pastor Garcia 82 y o  female MRN: [id number]  Unit/Bed#: -02 Encounter: [id number]    Assessment    Principal Problem:    Chronic atrial fibrillation  Active Problems:    COPD (chronic obstructive pulmonary disease) ([facility])    Diarrhea    Chronic anxiety    Chronic low back pain    Obesity    Chronic respiratory failure with hypoxia (HCC)    Debility      Home Pulmonary Medications:  Albuterol MDI  Combivent  Home Devices/Therapy: Home O2    Past Medical History:   Diagnosis Date    Asthma     Atrial fibrillation     Blurred vision     Cardiac disease     Colitis     COPD (chronic obstructive pulmonary disease)     Diverticulosis     Emphysema lung      Social History     Socioeconomic History    Marital status:       Spouse name: None    Number of children: None    Years of education: None    Highest education level: None   Occupational History    None   Social Needs    Financial resource strain: None    Food insecurity:     Worry: None     Inability: None    Transportation needs:     Medical: None     Non-medical: None   Tobacco Use    Smoking status: Former Smoker     Last attempt to quit: 2013     Years since quittin 8    Smokeless tobacco: Never Used   Substance and Sexual Activity    Alcohol use: Not Currently     Frequency: Never     Binge frequency: Never    Drug use: No    Sexual activity: Not Currently   Lifestyle    Physical activity:     Days per week: None     Minutes per session: None    Stress: None   Relationships    Social connections:     Talks on phone: None     Gets together: None     Attends Scientologist service: None     Active member of club or organization: None     Attends meetings of clubs or organizations: None     Relationship status: None    Intimate partner violence:     Fear of current or ex partner: None     Emotionally abused: None     Physically abused: None     Forced sexual activity: None   Other Topics Concern    None   Social History Narrative    None       Subjective         Objective    Physical Exam:   Assessment Type: Assess only  General Appearance: Alert, Awake  Respiratory Pattern: Dyspnea with exertion  Chest Assessment: Chest expansion symmetrical  Bilateral Breath Sounds: Diminished    Vitals:  Blood pressure 115/67, pulse 95, temperature 97 9 °F (36 6 °C), temperature source Tympanic, resp  rate (!) 24, height 5' (1 524 m), weight 74 1 kg (163 lb 6 4 oz), SpO2 97 %, not currently breastfeeding  Imaging and other studies: I have personally reviewed pertinent reports  Plan    Respiratory Plan: Home Bronchodilator Patient pathway        Resp Comments: Pt states she uses nebulizer at home   Not on med list & pt doesnt know what meds she uses

## 2019-10-12 NOTE — ASSESSMENT & PLAN NOTE
· PT/OT for discharge planning  · Patient was seen by orthopedic team during last admission who recommended outpatient physical therapy and follow up

## 2019-10-12 NOTE — ASSESSMENT & PLAN NOTE
· Patient with diarrhea recent colitis on CT  · Check stool studies, rule out C diff  · Start vanco 125mg zeferino 6 hours until c diff results return

## 2019-10-12 NOTE — ASSESSMENT & PLAN NOTE
· Patient with diarrhea recent colitis on CT  · Check stool studies, rule out C diff  · Continue vanco 125mg zeferino 6 hours until c diff results return

## 2019-10-12 NOTE — H&P
H&P- Heidi Sosa 1935, 82 y o  female MRN: [id number]    Unit/Bed#: -02 Encounter: [id number]    Primary Care Provider: Brianda Bautista MD   Date and time admitted to hospital: 10/11/2019  6:48 PM      * Chronic atrial fibrillation  Assessment & Plan  · Patient with atrial fib with RVR  · Placed on a Cardizem drip in the emergency room  · Start Cardizem 60mg po every 6 h  · She will be placed as step-down level 2  · Consult Cardiology    Diarrhea  Assessment & Plan  · Patient with diarrhea recent colitis on CT  · Check stool studies, rule out C diff  · Start vanco 125mg zeferino 6 hours until c diff results return    Debility  Assessment & Plan  · Multifactorial with multiple recent hospitalizations, diarrhea, atrial fibrillation, chronic back pain  · Patient is now agreeable to short-term rehab stay  · Case management for discharge planning  · PT OT for discharge recommendation    Chronic respiratory failure with hypoxia ([address] )  Assessment & Plan  · Patient wears 2 L oxygen at home    Obesity  Assessment & Plan  · BMI is 34    Chronic low back pain  Assessment & Plan  · PT OT for discharge planning    Chronic anxiety  Assessment & Plan  · Continue alprazolam from home  · Patient may benefit from psychiatry consult, she refused her last admission    COPD (chronic obstructive pulmonary disease) ([facility])  Assessment & Plan  · Continue prednisone taper  · Respiratory protocol      VTE Prophylaxis: Enoxaparin (Lovenox)  Code Status:  Full code  POLST: There is no POLST form on file for this patient (pre-hospital)  Discussion with patient    Anticipated Length of Stay:  Patient will be admitted on an Inpatient basis with an anticipated length of stay of  > 2 midnights     Justification for Hospital Stay:  IV Cardizem, specialist input, safe discharge planning with multiple recent recurrent hospitalizations    Chief Complaint:   Diarrhea, weakness    History of Present Illness:    Heidi Sosa is a 82 y o  female who presents with diarrhea  Patient with past medical history of COPD with emphysematous changes on CT with chronic oxygen of 5 L, recent colitis on CT, recent admission for elbow pain, atrial fibrillation with recent admission for RVR, returns to the emergency room secondary to report of diarrhea, weakness and back pain  Patient has been seen in the emergency room 4 times in the last 10 days and admitted now for times  On discharge patient was recommended short-term rehab but she refused  Patient was discharged yesterday and had breakfast   Around 4pm yesterday she started with diarrhea  She reports not eating or sleeping most of night secondary to diarrhea  30-50 times with lots of gas, foul smelling, went through over a dozen chucks, + incontinent  Home health nurse and therapy services were there and she was too weak to participate  In the emergency room she was found have AFib with RVR was given Cardizem 15 mg x1 and placed on a Cardizem drip, she also received 2 L of IV fluids  In discussion with the patient she is now agreeable to short-term rehab  Review of Systems:  Review of Systems   Constitutional: Positive for appetite change, chills and fever  HENT: Negative for sore throat and trouble swallowing  Eyes: Negative for discharge and redness  Respiratory: Positive for cough  Negative for shortness of breath  Cardiovascular: Negative for chest pain and leg swelling  Gastrointestinal: Positive for diarrhea  Negative for abdominal pain, blood in stool, nausea and vomiting  Genitourinary: Negative for dysuria and hematuria  Musculoskeletal: Negative for back pain and neck pain  Skin: Negative for rash and wound  Neurological: Positive for weakness  Negative for dizziness and headaches  Psychiatric/Behavioral: Negative for agitation and confusion         Past Medical and Surgical History:   Past Medical History:   Diagnosis Date    Asthma     Atrial fibrillation     Blurred vision     Cardiac disease     Colitis     COPD (chronic obstructive pulmonary disease)     Diverticulosis     Emphysema lung        Past Surgical History:   Procedure Laterality Date    BLADDER SURGERY      COLON SURGERY      COLONOSCOPY W/ POLYPECTOMY N/A 1/21/2019    Procedure: COLONOSCOPY W/ POLYPECTOMY;  Surgeon: Mitch Canavan, MD;  Location: [facility] GI LAB; Service: General    SMALL INTESTINE SURGERY         Meds/Allergies:  Prior to Admission medications    Medication Sig Start Date End Date Taking?  Authorizing Provider   apixaban (ELIQUIS) 5 mg Take 1 tablet (5 mg total) by mouth 2 (two) times a day 10/8/19  Yes Gato Smith MD   fluticasone-umeclidinium-vilanterol (TRELEGY ELLIPTA) 501-44 5-84 MCG/INH inhaler 1 puff daily 9/4/19  Yes Historical Provider, MD   furosemide (LASIX) 20 mg tablet Take 20 mg by mouth daily   Yes Historical Provider, MD   montelukast (SINGULAIR) 10 mg tablet Take 10 mg by mouth daily at bedtime   Yes Historical Provider, MD   potassium chloride (MICRO-K) 10 MEQ CR capsule Take 10 mEq by mouth daily   Yes Historical Provider, MD   predniSONE 20 mg tablet Take 2 tablets (40 mg total) by mouth daily [id number];[id number];77kte9ruav;08iwn8qtmz 10/10/19  Yes Gato Smith MD   Albuterol Sulfate 108 (90 Base) MCG/ACT AEPB Inhale 2 puffs 3 (three) times a day as needed    Historical Provider, MD   alendronate (FOSAMAX) 70 mg tablet Take 70 mg by mouth every 7 days    Historical Provider, MD   ALPRAZolam Wes Matteson) 0 5 mg tablet Take 0 5 mg by mouth daily at bedtime as needed    Historical Provider, MD   Calcium Carb-Cholecalciferol (CALCIUM 1000 + D PO) Take 1,000 mg by mouth daily 3/28/18   Historical Provider, MD   diltiazem (CARDIZEM CD) 180 mg 24 hr capsule Take 1 capsule (180 mg total) by mouth daily  Patient not taking: Reported on 10/11/2019 10/8/19   Gato Smith MD   famotidine (PEPCID) 40 MG tablet Take 40 mg by mouth as needed     Historical Provider, MD   ipratropium-albuterol (COMBIVENT RESPIMAT) inhaler Every 6 hours    Historical Provider, MD     I have reviewed home medications with patient personally  Allergies: Allergies   Allergen Reactions    Fluticasone        Social History:  Marital Status:    Occupation:  Retired  Patient Pre-hospital Living Situation:  Home alone  Patient Pre-hospital Level of Mobility:  Limited w/ walker  Patient Pre-hospital Diet Restrictions:  Regular  Substance Use History:     Social History     Substance and Sexual Activity   Alcohol Use Not Currently    Frequency: Never    Binge frequency: Never     Social History     Tobacco Use   Smoking Status Former Smoker    Last attempt to quit: 2013    Years since quittin 8   Smokeless Tobacco Never Used     Social History     Substance and Sexual Activity   Drug Use No       Family History:  I have reviewed the patients family history    Physical Exam:   Vitals:   Blood Pressure: 115/81 (10/11/19 2200)  Pulse: (!) 159 (10/11/19 2200)  Temperature: 98 4 °F (36 9 °C) (10/11/19 2200)  Temp Source: Temporal (10/11/19 2200)  Respirations: 22 (10/11/19 2200)  Height: 5' (152 4 cm) (10/11/19 1847)  Weight - Scale: 80 3 kg (177 lb) (10/11/19 1847)  SpO2: 95 % (10/11/19 2200)    Physical Exam   Constitutional: She appears well-developed and well-nourished  Fatigued and ill appearing Elderly  female, oxygen in place   HENT:   Head: Normocephalic and atraumatic  Eyes: Conjunctivae and EOM are normal  Right eye exhibits no discharge  Left eye exhibits no discharge  Neck: Normal range of motion  No tracheal deviation present  Cardiovascular: Normal heart sounds  An irregularly irregular rhythm present  Tachycardia present  Exam reveals no gallop and no friction rub  No murmur heard  Pulmonary/Chest: Effort normal and breath sounds normal  No respiratory distress  She has no wheezes  She has no rales  Decreased bases   Abdominal: Soft  Bowel sounds are normal  She exhibits distension  She exhibits no mass  There is no tenderness  There is no guarding  Musculoskeletal: Normal range of motion  She exhibits no edema, tenderness or deformity  Neurological: She is alert  Speech intact, moving extremities   Skin: Skin is warm and dry  No rash noted  No erythema  There is pallor  Psychiatric: She has a normal mood and affect  Her behavior is normal  Judgment and thought content normal    Nursing note and vitals reviewed  Additional Data:   Lab Results: I have personally reviewed pertinent reports  Results from last 7 days   Lab Units 10/11/19  1926   WBC Thousand/uL 12 60*   HEMOGLOBIN g/dL 11 0*   HEMATOCRIT % 33 6*   PLATELETS Thousands/uL 272   NEUTROS PCT % 79*   LYMPHS PCT % 14*   MONOS PCT % 8   EOS PCT % 0     Results from last 7 days   Lab Units 10/11/19  1926   POTASSIUM mmol/L 3 8   CHLORIDE mmol/L 98   CO2 mmol/L 27   BUN mg/dL 33*   CREATININE mg/dL 0 67   CALCIUM mg/dL 8 7   ALK PHOS U/L 71   ALT U/L 32   AST U/L 14     Results from last 7 days   Lab Units 10/11/19  1926   INR  1 34       NetAccess/Epic Records Reviewed: Yes     ** Please Note: This note has been constructed using a voice recognition system   **

## 2019-10-12 NOTE — PROGRESS NOTES
Vancomycin Assessment    Daniel Vasques is a 82 y o  female who is currently receiving vancomycin 1250mg IV Q12hrs  for MRSA confirmed, Pneumonia     Relevant clinical data and objective history reviewed:  Creatinine   Date Value Ref Range Status   10/12/2019 0 51 (L) 0 60 - 1 20 mg/dL Final     Comment:     Standardized to IDMS reference method   10/11/2019 0 67 0 60 - 1 20 mg/dL Final     Comment:     Standardized to IDMS reference method   10/09/2019 0 51 (L) 0 60 - 1 20 mg/dL Final     Comment:     Standardized to IDMS reference method   01/08/2016 0 61 0 60 - 1 30 mg/dL Final     Comment:     Standardized to IDMS reference method   10/01/2013 0 43 (L) 0 60 - 1 30 mg/dL Final     Comment:     Result Comment: Standardized to IDMS reference method     /76 (BP Location: Left arm)   Pulse 92   Temp 97 8 °F (36 6 °C) (Tympanic)   Resp 22   Ht 5' (1 524 m) Comment: Stated  Wt (!) 166 kg (365 lb 15 4 oz)   LMP  (LMP Unknown)   SpO2 95%   Breastfeeding? No   BMI 71 47 kg/m²   I/O last 3 completed shifts: In: 3504 2 [I V :54 2; IV Piggyback:3450]  Out: 400 [Urine:400]  Lab Results   Component Value Date/Time    BUN 19 10/12/2019 05:07 AM    BUN 13 01/08/2016 11:16 AM    WBC 12 60 (H) 10/11/2019 07:26 PM    WBC 7 4 05/08/2018 11:38 AM    HGB 11 0 (L) 10/11/2019 07:26 PM    HGB 13 3 05/08/2018 11:38 AM    HCT 33 6 (L) 10/11/2019 07:26 PM    HCT 40 9 05/08/2018 11:38 AM    MCV 86 10/11/2019 07:26 PM    MCV 88 1 05/08/2018 11:38 AM     10/11/2019 07:26 PM     05/08/2018 11:38 AM     Temp Readings from Last 3 Encounters:   10/12/19 97 8 °F (36 6 °C) (Tympanic)   10/10/19 98 3 °F (36 8 °C) (Tympanic)   10/08/19 98 1 °F (36 7 °C) (Temporal)     Vancomycin Days of Therapy: 1    Assessment/Plan  The patient is currently on vancomycin utilizing scheduled dosing based on adjusted body weight (due to obesity)    Baseline risks associated with therapy include: concomitant nephrotoxic medications and advanced age  The patient is currently receiving 1250mg IV Q12hrs  and after clinical evaluation will be changed to 1000mg IV q12hrs   Pharmacy will also follow closely for s/sx of nephrotoxicity, infusion reactions and appropriateness of therapy  BMP and CBC will be ordered per protocol  Plan for trough as patient approaches steady state, prior to the 4th  dose at approximately 2230 on 10/13/19  Due to infection severity, will target a trough of 15-20 (appropriate for most indications)   Pharmacy will continue to follow the patients culture results and clinical progress daily      Ovi Carmen, Pharmacist

## 2019-10-12 NOTE — ASSESSMENT & PLAN NOTE
· Patient with continued cough, shortness of breath, and chronic respiratory failure requiring home oxygen  · Patient has had multiple hospitalizations over the last 3 weeks  · Sputum culture done on last admission showed Haemophilus influenza, MRSA, Klebsiella pneumoniae  · Discussed case with ID, it given that patient abnormal procalcitonin and is currently afebrile no need for treatment at this time    If patient does develop fever/leukocytosis/worsening symptoms will repeat imaging and treat accordingly

## 2019-10-12 NOTE — ASSESSMENT & PLAN NOTE
· Currently off Cardizem drip  · Will continue p o   Cardizem  · Currently on Eliquis  · Will downgrade to med surg/tele  · Consult Cardiology

## 2019-10-12 NOTE — PLAN OF CARE
Problem: OCCUPATIONAL THERAPY ADULT  Goal: Performs self-care activities at highest level of function for planned discharge setting  See evaluation for individualized goals  Description  Treatment Interventions: ADL retraining, Functional transfer training, Endurance training, Cognitive reorientation, Equipment evaluation/education, Neuromuscular reeducation, Energy conservation     See flowsheet documentation for full assessment, interventions and recommendations  Note:   Limitation: Decreased Safe judgement during ADL, Decreased cognition, Decreased endurance, Decreased self-care trans, Decreased high-level ADLs  Prognosis: Good  Assessment: Pt is a 82 y o  female seen for OT evaluation s/p admit to [facility] on 10/11/2019 w/ Chronic atrial fibrillation  Comorbidities affecting pt's functional performance at time of assessment include: Asthma, A-fib, Blurred vision, Cardiac disease, Colitis, COPD, Diverticulosis, Emphysema  Personal factors affecting pt at time of IE include:steps to enter environment, limited home support, difficulty performing ADLS, difficulty performing IADLS  and limited insight into deficits  Prior to admission, pt was Mod I with aDLs  Upon evaluation: the following deficits impact occupational performance: decreased balance, decreased tolerance, impaired memory, impaired problem solving, decreased safety awareness and increased pain  Pt to benefit from continued skilled OT tx while in the hospital to address deficits as defined above and maximize level of functional independence w ADL's and functional mobility  Occupational Performance areas to address include: bathing/shower, toilet hygiene, dressing, functional mobility and clothing management  From OT standpoint, recommendation at time of d/c would be STR       OT Discharge Recommendation: Short Term Rehab  OT - OK to Discharge: Yes(when medically stable)    Davin Rocha MS, OTR/L

## 2019-10-12 NOTE — PLAN OF CARE
Problem: Potential for Falls  Goal: Patient will remain free of falls  Description  INTERVENTIONS:  - Assess patient frequently for physical needs  -  Identify cognitive and physical deficits and behaviors that affect risk of falls    -  [place] fall precautions as indicated by assessment   - Educate patient/family on patient safety including physical limitations  - Instruct patient to call for assistance with activity based on assessment  - Modify environment to reduce risk of injury  - Consider OT/PT consult to assist with strengthening/mobility  Outcome: Progressing     Problem: Prexisting or High Potential for Compromised Skin Integrity  Goal: Skin integrity is maintained or improved  Description  INTERVENTIONS:  - Identify patients at risk for skin breakdown  - Assess and monitor skin integrity  - Assess and monitor nutrition and hydration status  - Monitor labs   - Assess for incontinence   - Turn and reposition patient  - Assist with mobility/ambulation  - Relieve pressure over bony prominences  - Avoid friction and shearing  - Provide appropriate hygiene as needed including keeping skin clean and dry  - Evaluate need for skin moisturizer/barrier cream  - Collaborate with interdisciplinary team   - Patient/family teaching  - Consider wound care consult   Outcome: Progressing     Problem: PAIN - ADULT  Goal: Verbalizes/displays adequate comfort level or baseline comfort level  Description  Interventions:  - Encourage patient to monitor pain and request assistance  - Assess pain using appropriate pain scale  - Administer analgesics based on type and severity of pain and evaluate response  - Implement non-pharmacological measures as appropriate and evaluate response  - Consider cultural and social influences on pain and pain management  - Notify physician/advanced practitioner if interventions unsuccessful or patient reports new pain  Outcome: Progressing     Problem: INFECTION - ADULT  Goal: Absence or prevention of progression during hospitalization  Description  INTERVENTIONS:  - Assess and monitor for signs and symptoms of infection  - Monitor lab/diagnostic results  - Monitor all insertion sites, i e  indwelling lines, tubes, and drains  - Monitor endotracheal if appropriate and nasal secretions for changes in amount and color  - [place] appropriate cooling/warming therapies per order  - Administer medications as ordered  - Instruct and encourage patient and family to use good hand hygiene technique  - Identify and instruct in appropriate isolation precautions for identified infection/condition  Outcome: Progressing  Goal: Absence of fever/infection during neutropenic period  Description  INTERVENTIONS:  - Monitor WBC    Outcome: Progressing     Problem: SAFETY ADULT  Goal: Maintain or return to baseline ADL function  Description  INTERVENTIONS:  -  Assess patient's ability to carry out ADLs; assess patient's baseline for ADL function and identify physical deficits which impact ability to perform ADLs (bathing, care of mouth/teeth, toileting, grooming, dressing, etc )  - Assess/evaluate cause of self-care deficits   - Assess range of motion  - Assess patient's mobility; develop plan if impaired  - Assess patient's need for assistive devices and provide as appropriate  - Encourage maximum independence but intervene and supervise when necessary  - Involve family in performance of ADLs  - Assess for home care needs following discharge   - Consider OT consult to assist with ADL evaluation and planning for discharge  - Provide patient education as appropriate  Outcome: Progressing  Goal: Maintain or return mobility status to optimal level  Description  INTERVENTIONS:  - Assess patient's baseline mobility status (ambulation, transfers, stairs, etc )    - Identify cognitive and physical deficits and behaviors that affect mobility  - Identify mobility aids required to assist with transfers and/or ambulation (gait belt, sit-to-stand, lift, walker, cane, etc )  - [place] fall precautions as indicated by assessment  - Record patient progress and toleration of activity level on Mobility SBAR; progress patient to next Phase/Stage  - Instruct patient to call for assistance with activity based on assessment  - Consider rehabilitation consult to assist with strengthening/weightbearing, etc   Outcome: Progressing     Problem: DISCHARGE PLANNING  Goal: Discharge to home or other facility with appropriate resources  Description  INTERVENTIONS:  - Identify barriers to discharge w/patient and caregiver  - Arrange for needed discharge resources and transportation as appropriate  - Identify discharge learning needs (meds, wound care, etc )  - Arrange for interpretive services to assist at discharge as needed  - Refer to Case Management Department for coordinating discharge planning if the patient needs post-hospital services based on physician/advanced practitioner order or complex needs related to functional status, cognitive ability, or social support system  Outcome: Progressing     Problem: Knowledge Deficit  Goal: Patient/family/caregiver demonstrates understanding of disease process, treatment plan, medications, and discharge instructions  Description  Complete learning assessment and assess knowledge base    Interventions:  - Provide teaching at level of understanding  - Provide teaching via preferred learning methods  Outcome: Progressing

## 2019-10-12 NOTE — ASSESSMENT & PLAN NOTE
· Continue alprazolam from home  · Patient may benefit from psychiatry consult, she refused her last admission

## 2019-10-12 NOTE — ASSESSMENT & PLAN NOTE
· Patient with atrial fib with RVR  · Placed on a Cardizem drip in the emergency room  · Start Cardizem 60mg po every 6 h  · She will be placed as step-down level 2  · Consult Cardiology

## 2019-10-12 NOTE — PROGRESS NOTES
Progress Note - Bobby Yip 1935, 82 y o  female MRN: [id number]    Unit/Bed#: -02 Encounter: [id number]    Primary Care Provider: Trenton De La Rosa MD   Date and time admitted to hospital: 10/11/2019  6:48 PM      Positive culture findings in sputum  Assessment & Plan  · Patient with continued cough, shortness of breath, and chronic respiratory failure requiring home oxygen  · Patient has had multiple hospitalizations over the last 3 weeks  · Sputum culture done on last admission showed Haemophilus influenza, MRSA, Klebsiella pneumoniae  · Discussed case with ID, it given that patient abnormal procalcitonin and is currently afebrile no need for treatment at this time  If patient does develop fever/leukocytosis/worsening symptoms will repeat imaging and treat accordingly    * Chronic atrial fibrillation  Assessment & Plan  · Currently off Cardizem drip  · Will continue p o  Cardizem  · Currently on Eliquis  · Will downgrade to med surg/tele  · Consult Cardiology    COPD (chronic obstructive pulmonary disease) ([facility] [address] )  Assessment & Plan  · Continue steroid taper  · Continue duo nebs  · Continue oxygen nasal cannula    Chronic respiratory failure with hypoxia (HCC)  Assessment & Plan  · Patient wears 2 L oxygen at home    Chronic low back pain  Assessment & Plan  · PT/OT for discharge planning  · Patient was seen by orthopedic team during last admission who recommended outpatient physical therapy and follow up    Diarrhea  Assessment & Plan  · Patient with diarrhea recent colitis on CT  · Check stool studies, rule out C diff  · Continue vanco 125mg zeferino 6 hours until c diff results return      VTE Pharmacologic Prophylaxis: Pharmacologic: Apixaban (Eliquis)    Patient Centered Rounds: I have performed bedside rounds with nursing staff today      Discussions with Specialists or Other Care Team Provider: yes  Education and Discussions with Family / Patient: yes    Current Length of Stay: 1 day(s)    Current Patient Status: Inpatient   Certification Statement: The patient will continue to require additional inpatient hospital stay due to AFib    Discharge Plan:  Pending hospital course    Code Status: Level 1 - Full Code    Subjective:   Patient complaining of cough with mild shortness of breath  Cough she states is chronic  Denies any chest pain  No palpitations  Currently afebrile    Objective:     Vitals:   Temp (24hrs), Av °F (36 7 °C), Min:97 3 °F (36 3 °C), Max:98 8 °F (37 1 °C)    Temp:  [97 3 °F (36 3 °C)-98 8 °F (37 1 °C)] 97 8 °F (36 6 °C)  HR:  [] 92  Resp:  [18-24] 22  BP: ()/() 112/76  SpO2:  [95 %-100 %] 95 %  Body mass index is 71 47 kg/m²  Input and Output Summary (last 24 hours): Intake/Output Summary (Last 24 hours) at 10/12/2019 1023  Last data filed at 10/12/2019 1016  Gross per 24 hour   Intake 4204 2 ml   Output 400 ml   Net 3804 2 ml       Physical Exam:     Physical Exam   Constitutional: No distress  Obese  elderly female   HENT:   Head: Normocephalic and atraumatic  Eyes: Conjunctivae and EOM are normal    Neck: Normal range of motion  Neck supple  Cardiovascular: Normal rate and regular rhythm  Pulmonary/Chest: No respiratory distress  Decreased breath sounds bilaterally   Abdominal: Soft  She exhibits no distension  There is no tenderness  Musculoskeletal:   Generalized weakness  Trace edema   Neurological: She is alert  No cranial nerve deficit  Skin: Skin is warm and dry  Psychiatric: She has a normal mood and affect   Her behavior is normal        Additional Data:     Labs:    Results from last 7 days   Lab Units 10/11/19  1926   WBC Thousand/uL 12 60*   HEMOGLOBIN g/dL 11 0*   HEMATOCRIT % 33 6*   PLATELETS Thousands/uL 272   NEUTROS PCT % 79*   LYMPHS PCT % 14*   MONOS PCT % 8   EOS PCT % 0     Results from last 7 days   Lab Units 10/12/19  0507 10/11/19  1926   POTASSIUM mmol/L 3 6 3 8   CHLORIDE mmol/L 109* 98   CO2 mmol/L 25 27   BUN mg/dL 19 33*   CREATININE mg/dL 0 51* 0 67   CALCIUM mg/dL 7 4* 8 7   ALK PHOS U/L  --  71   ALT U/L  --  32   AST U/L  --  14     Results from last 7 days   Lab Units 10/11/19  1926   INR  1 34               * I Have Reviewed All Lab Data Listed Above  * Additional Pertinent Lab Tests Reviewed: [facility] Admission  Reviewed    Imaging:  Imaging Reports Reviewed Today Include:  No new imaging    Recent Cultures (last 7 days):     Results from last 7 days   Lab Units 10/09/19  0651 10/06/19  0711   BLOOD CULTURE   --  No Growth After 5 Days  No Growth After 5 Days  SPUTUM CULTURE  4+ Growth of Haemophilus influenzae*  2+ Growth of Klebsiella pneumoniae*  2+ Growth of Methicillin Resistant Staphylococcus aureus*  2+ Growth of   --    GRAM STAIN RESULT  4+ Polys*  3+ Gram negative coccobacilli*  Rare Gram positive cocci in pairs*  Rare Gram positive rods*  --        Last 24 Hours Medication List:     Current Facility-Administered Medications:  acetaminophen 650 mg Oral Q4H PRN Vergia Pellet, PA-C    albuterol 2 5 mg Nebulization Q4H PRN Karine Ward MD    ALPRAZolam 0 5 mg Oral HS PRN Vergia Pellet, PA-C    apixaban 5 mg Oral BID Vergia Pellet, PA-C    diltiazem 1-15 mg/hr Intravenous Once Vergia Pellet, PA-C Last Rate: Stopped (10/12/19 0110)   diltiazem 60 mg Oral Q6H [address] Christina Lee PA-C    famotidine 40 mg Oral Daily Vergia Pellet, PA-C    furosemide 20 mg Oral Daily Vergia Pellet, PA-C    ipratropium-albuterol 3 mL Nebulization Q6H While awake Karine Ward MD    ondansetron 4 mg Intravenous Q6H PRN Vergia Pellet, PA-C    [START ON 10/13/2019] predniSONE 30 mg Oral Daily Luis Contreras MD    vancomycin 125 mg Oral Q6H [address] Vergia Pellet, PA-C         Today, Patient Was Seen By: Luis Contreras MD    ** Please Note: Dictation voice to text software may have been used in the creation of this document   **

## 2019-10-12 NOTE — ASSESSMENT & PLAN NOTE
· Multifactorial with multiple recent hospitalizations, diarrhea, atrial fibrillation, chronic back pain  · Patient is now agreeable to short-term rehab stay  · Case management for discharge planning  · PT OT for discharge recommendation

## 2019-10-12 NOTE — CONSULTS
[address] [facility] [address]  [address], [address]  [phone]     Tax ID: [id number]      NPI: [id number]                                                Cardiology Consult  Joe Barrios 82 y o  female   YOB: 1935 MRN: [id number]  Unit/Bed#: -02 Encounter: [id number]      Physician Requesting Consult: Arnav Paredes MD  Reason for Consult / Principal Problem: atrial fibrillation     Assessment and Plan  1  Atrial fibrillation, recently diagnosed  2  Diarrhea  · C diff testing pending  · Sputum culture positive for H influenza  · ?drug-related, says diarrhea started after initiating medication for back pain  3  COPD on home O2  4  Frequent ED visits, 4 ED visits in 10 days, refuses to go to rehab    Plan  Atrial fibrillation  · ECG from 10/11/2019 showed atrial fibrillation, with nonspecific ST-T changes  She was previously in sinus rhythm on 10/9/19  · Secondary to frequent diarrhea/infection  · Home cardizem dose was  mg  · Got IV Cardizem 15 mg, followed by Cardizem drip at 5 to call 0 5 milligrams/hour for about 5 hours, and then was switched to oral Cardizem at increased dose of 60 mg Q 6  · Heart rates now much better, after significant IV fluids at admission  · Can switch to Cardizem  mg once acute infection symptoms are better  · Consider adding metoprolol 25 mg b i d  If heart rate remains a problem  · Has PVCs, ?slow non-sustained VT --> Keep potassium greater than 4, Mag greater than 2  · On Eliquis 5 mg b i d  For anticoagulation    History of Present Illness   HPI: Joe Barrios is a 82y o  year old female who presents with symptoms of frequent diarrhea  53-year-old female has had 3 hospitalizations in the last couple of weeks with for ED visits for a variety of symptoms  Initially she was seen by Cardiology on the 6th of October for atrial fibrillation    She was started on Cardizem CD 1 80 mg and Eliquis and discharged  Subsequently she had a couple of additional ED visits for back pain and then diarrhea  She reports no symptoms of fever, cough, shortness of breath, chest pain, palpitations  She does report symptoms of frequent diarrhea up to 10 times yesterday, which is now improving  She initially had back pain after twisting her back at home  Past Medical History:   Diagnosis Date    Asthma     Atrial fibrillation     Blurred vision     Cardiac disease     Colitis     COPD (chronic obstructive pulmonary disease)     Diverticulosis     Emphysema lung      Past Surgical History:   Procedure Laterality Date    BLADDER SURGERY      COLON SURGERY      COLONOSCOPY W/ POLYPECTOMY N/A 1/21/2019    Procedure: COLONOSCOPY W/ POLYPECTOMY;  Surgeon: Joel Stockton MD;  Location: [address] [place] [address] GI LAB;   Service: General    SMALL INTESTINE SURGERY       Family History   Problem Relation Age of Onset    Heart disease Mother      Meds/Allergies   all current active meds have been reviewed and current meds:   Current Facility-Administered Medications   Medication Dose Route Frequency    acetaminophen (TYLENOL) tablet 650 mg  650 mg Oral Q4H PRN    albuterol inhalation solution 2 5 mg  2 5 mg Nebulization Q4H PRN    ALPRAZolam (XANAX) tablet 0 5 mg  0 5 mg Oral HS PRN    apixaban (ELIQUIS) tablet 5 mg  5 mg Oral BID    diltiazem (CARDIZEM) 125 mg in sodium chloride 0 9 % 125 mL infusion  1-15 mg/hr Intravenous Once    diltiazem (CARDIZEM) tablet 60 mg  60 mg Oral Q6H [address]    famotidine (PEPCID) tablet 40 mg  40 mg Oral Daily    furosemide (LASIX) tablet 20 mg  20 mg Oral Daily    ipratropium-albuterol (DUO-NEB) 0 5-2 5 mg/3 mL inhalation solution 3 mL  3 mL Nebulization Q6H While awake    ondansetron (ZOFRAN) injection 4 mg  4 mg Intravenous Q6H PRN    [START ON 10/13/2019] predniSONE tablet 30 mg  30 mg Oral Daily    vancomycin (VANCOCIN) oral solution 125 mg  125 mg Oral Q6H [address]     Medications Prior to Admission   Medication    apixaban (ELIQUIS) 5 mg    fluticasone-umeclidinium-vilanterol (TRELEGY ELLIPTA) 100-62 5-25 MCG/INH inhaler    furosemide (LASIX) 20 mg tablet    ipratropium (ATROVENT) 0 02 % nebulizer solution    montelukast (SINGULAIR) 10 mg tablet    potassium chloride (MICRO-K) 10 MEQ CR capsule    predniSONE 20 mg tablet    Albuterol Sulfate 108 (90 Base) MCG/ACT AEPB    alendronate (FOSAMAX) 70 mg tablet    ALPRAZolam (XANAX) 0 5 mg tablet    Calcium Carb-Cholecalciferol (CALCIUM 1000 + D PO)    diltiazem (CARDIZEM CD) 180 mg 24 hr capsule    famotidine (PEPCID) 40 MG tablet    ipratropium-albuterol (COMBIVENT RESPIMAT) inhaler     Allergies   Allergen Reactions    Fluticasone      Social History     Socioeconomic History    Marital status:       Spouse name: None    Number of children: None    Years of education: None    Highest education level: None   Occupational History    None   Social Needs    Financial resource strain: None    Food insecurity:     Worry: None     Inability: None    Transportation needs:     Medical: None     Non-medical: None   Tobacco Use    Smoking status: Former Smoker     Last attempt to quit: 2013     Years since quittin 8    Smokeless tobacco: Never Used   Substance and Sexual Activity    Alcohol use: Not Currently     Frequency: Never     Binge frequency: Never    Drug use: No    Sexual activity: Not Currently   Lifestyle    Physical activity:     Days per week: None     Minutes per session: None    Stress: None   Relationships    Social connections:     Talks on phone: None     Gets together: None     Attends Rastafari service: None     Active member of club or organization: None     Attends meetings of clubs or organizations: None     Relationship status: None    Intimate partner violence:     Fear of current or ex partner: None     Emotionally abused: None     Physically abused: None     Forced sexual activity: None Other Topics Concern    None   Social History Narrative    None         Review of Systems  No c/o chest pain, No c/o palpitations, No c/o shortness of breath, No c/o dizziness or light-headedness, No c/o abdominal pain, c/o nausea, diarrhea+  All other systems negative    Vitals:    10/12/19 0728 10/12/19 0749 10/12/19 0814 10/12/19 1206   BP: 96/51  112/76 111/59   BP Location: Right arm  Left arm Left arm   Pulse: 92   85   Resp: 22   20   Temp: 97 8 °F (36 6 °C)   (!) 97 °F (36 1 °C)   TempSrc: Tympanic   Tympanic   SpO2: 95% 95%  97%   Weight:       Height:         Orthostatic Blood Pressures      Most Recent Value   Blood Pressure  111/59 filed at 10/12/2019 1206   Patient Position - Orthostatic VS  Sitting filed at 10/12/2019 1206        Body mass index is 71 47 kg/m²  Wt Readings from Last 5 Encounters:   10/12/19 (!) 166 kg (365 lb 15 4 oz)   10/09/19 77 3 kg (170 lb 6 7 oz)   10/08/19 74 7 kg (164 lb 10 9 oz)   10/04/19 76 1 kg (167 lb 12 3 oz)   09/29/19 77 1 kg (170 lb)     I/O last 3 completed shifts: In: 3504 2 [I V :54 2; IV Piggyback:3450]  Out: 400 [Urine:400]      Physical Exam    Constitutional:  Krysten Hidden appears well, alert and oriented x 3, pleasant and cooperative  No acute distress   HEENT:    Normocephalic, atraumatic   Neck:  Supple, No JVD   Lungs:  Clear to auscultation bilaterally, respirations unlabored    Chest Wall:  No tenderness or deformity    Heart[de-identified]  Irregularly, irregular, normal rat, S1 and S2 normal, no murmur, rub or gallop    Abdomen:  Soft, non-tender, non-distended   Neurological:  Awake, alert, oriented x3   Non-focal exam    Extremities:  No pedal edema, No calf tenderness         Labs:  Results from last 7 days   Lab Units 10/11/19  1926 10/09/19  0651 10/07/19  0502 10/06/19  0711   WBC Thousand/uL 12 60* 11 20* 9 00 10 00   HEMOGLOBIN g/dL 11 0* 12 7 12 5 14 1   HEMATOCRIT % 33 6* 38 0* 39 1* 44 2   RDW % 14 6* 14 0 14 6* 14 5   PLATELETS Thousands/uL 272 198 210 296     Results from last 7 days   Lab Units 10/12/19  0507 10/11/19  1926 10/09/19  0651 10/07/19  0502 10/06/19  0711   POTASSIUM mmol/L 3 6 3 8 4 4 4 1 3 7   CHLORIDE mmol/L 109* 98 97* 103 96*   CO2 mmol/L 25 27 30 31 30   MAGNESIUM mg/dL 2 1  --   --   --  1 9   BUN mg/dL 19 33* 12 7 17   CREATININE mg/dL 0 51* 0 67 0 51* 0 46* 0 57*   CALCIUM mg/dL 7 4* 8 7 8 7 8 3* 9 1   AST U/L  --  14 15  --  16   ALT U/L  --  32 25  --  32   ALK PHOS U/L  --  71 72  --  51*     Results from last 7 days   Lab Units 10/06/19  1448 10/06/19  1223 10/06/19  0711   TROPONIN I ng/mL 0 03 0 03 <0 03         Results from last 7 days   Lab Units 10/11/19  1926   INR  1 34           EKG:  Sinus rhythm with frequent  Telemetry:  Normal sinus rhythm with frequent PACs  PVCs  Idioventricular rhythm @80 bpm  Imaging: Ct Abdomen Pelvis Wo Contrast    Result Date: 10/2/2019  Narrative: CT ABDOMEN AND PELVIS WITHOUT IV CONTRAST INDICATION:   Abd pain, gastroenteritis or colitis suspected  COMPARISON:  December 28, 2018 TECHNIQUE:  CT examination of the abdomen and pelvis was performed without intravenous contrast   Axial, sagittal, and coronal 2D reformatted images were created from the source data and submitted for interpretation  Radiation dose length product (DLP) for this visit:  668 6 mGy-cm   This examination, like all CT scans performed in the [facility], was performed utilizing techniques to minimize radiation dose exposure, including the use of iterative reconstruction and automated exposure control  Enteric contrast was not administered  FINDINGS: ABDOMEN LOWER CHEST:  Emphysematous changes within the lungs are seen  The heart is enlarged  LIVER/BILIARY TREE:  Hepatomegaly  GALLBLADDER:  No calcified gallstones  No pericholecystic inflammatory change  SPLEEN:  Unremarkable  PANCREAS:  Unremarkable  ADRENAL GLANDS:  Unremarkable  KIDNEYS/URETERS:  One or more simple renal cyst(s) is noted  Otherwise unremarkable kidneys  No hydronephrosis  STOMACH AND BOWEL:  Markedly distended stomach is seen  Surgical clips around the sigmoid colon are visualized  Liquid stool is seen within the colon  APPENDIX:  No findings to suggest appendicitis  ABDOMINOPELVIC CAVITY:  No ascites or free intraperitoneal air  No lymphadenopathy  VESSELS:  Unremarkable for patient's age  PELVIS REPRODUCTIVE ORGANS:  Unremarkable for patient's age  URINARY BLADDER:  Unremarkable  ABDOMINAL WALL/INGUINAL REGIONS:  Fat-containing anterior abdominal wall hernia is seen  OSSEOUS STRUCTURES:  Degenerative changes in the spine is seen  Impression: Liquid stool within the colon suggestive of colitis  Workstation performed: [id number]     Xr Chest 1 View Portable    Result Date: 10/9/2019  Narrative: CHEST INDICATION:   Cough, shortness of breath  COMPARISON:  Chest radiograph from 10/6/2019 and 10/28/2018  Chest CT from 11/27/2018  EXAM PERFORMED/VIEWS:  XR CHEST PORTABLE FINDINGS: Cardiomediastinal silhouette appears unremarkable  The lungs are clear  Emphysema is better demonstrated on CT  No pneumothorax or pleural effusion  Osseous structures appear within normal limits for patient age  Impression: No acute cardiopulmonary disease  Emphysema, better demonstrated on CT  Workstation performed: [id number]     Xr Chest 1 View Portable    Result Date: 10/6/2019  Narrative: CHEST INDICATION:   Fever with generalized pain  COMPARISON:  12/28/2018 EXAM PERFORMED/VIEWS:  XR CHEST PORTABLE FINDINGS: Cardiomediastinal silhouette appears unremarkable  The lungs are clear  No pneumothorax or pleural effusion  Osseous structures appear within normal limits for patient age  Impression: No acute cardiopulmonary disease  Workstation performed: [id number]     Xr Elbow 3+ Vw Left    Result Date: 9/29/2019  Narrative: LEFT ELBOW INDICATION:   Rule out osteomyelitis   COMPARISON:  September 24, 2019 VIEWS:  XR ELBOW 3+ VW LEFT Images: 5 FINDINGS: There is no acute fracture or dislocation  There is no joint effusion  There is a small calcification off the tip of the olecranon  Again, this appears well-corticated  This may be old  There is soft tissue swelling surrounding the olecranon  This may represent a bursitis  This is slightly more impressive than was seen 5 days ago  No lytic or blastic lesions are seen  Impression: Suspicious for olecranon bursitis Workstation performed: [id number]     Xr Elbow 3+ Vw Left    Result Date: 2019  Narrative: LEFT ELBOW INDICATION:   M25 422: Effusion, left elbow  COMPARISON:  None VIEWS:  XR ELBOW 3+ VW LEFT FINDINGS: Proximal olecranon process loss of bony cortex with lucency  Adjacent 5 mm bony density  Posterior soft tissue swelling  Anterior distal radial head cortical step-off lateral view with anterior and posterior fat pad accentuation  No significant degenerative changes  No lytic or blastic lesions are seen  Impression: Posterior soft tissue swelling  Olecranon process bony cortex destruction  Osteomyelitis not excluded  Small olecranon process cortical avulsion fracture and distal radial head cortical step-off which may reflect fracture  Anterior and posterior fat pad accentuation  Recommend MRI  The study was marked in EPIC for significant notification   Workstation performed: [id number]       Cardiac testing:   Results for orders placed during the hospital encounter of 10/06/19   Echo complete with contrast if indicated    Narrative [address]  [address]    Transthoracic Echocardiogram  2D, M-mode, Doppler, and Color Doppler    Study date:  07-Oct-2019    Patient: Moy Hickey  MR number: [id number]  Account number: [de-identified]  : 1935  Age: 82 years  Gender: Female  Status: Inpatient  [facility]   Height: 60 in  Weight: 164 lb  BP: 88/ 53 mmHg    Indications: Afib    Diagnoses: I48 0 - Atrial fibrillation    Sonographer:  Sander Antunez  Referring Physician:  Gilberto Allen MD  Group:  [facility]  Interpreting Physician:  Annie Guevara MD    SUMMARY    LEFT VENTRICLE:  Systolic function was normal  Ejection fraction was estimated to be 60 %  There were no regional wall motion abnormalities  There was mild concentric hypertrophy  Doppler parameters were consistent with abnormal left ventricular relaxation (grade 1 diastolic dysfunction)  MITRAL VALVE:  There was mild annular calcification  There was trace regurgitation  AORTIC VALVE:  There was mild regurgitation  TRICUSPID VALVE:  There was trace regurgitation  HISTORY: PRIOR HISTORY: Congestive heart failure  Atrial fibrillation  Chronic lung disease  PROCEDURE: The study was performed in the [facility]  This was a routine study  The transthoracic approach was used  The study included complete 2D imaging, M-mode, complete spectral Doppler, and color Doppler  The  heart rate was 92 bpm, at the start of the study  Images were obtained from the parasternal, apical, subcostal, and suprasternal notch acoustic windows  Echocardiographic views were limited due to poor acoustic window availability,  decreased penetration, and lung interference  This was a technically difficult study  LEFT VENTRICLE: Size was normal  Systolic function was normal  Ejection fraction was estimated to be 60 %  There were no regional wall motion abnormalities  Wall thickness was mildly increased  There was mild concentric hypertrophy  DOPPLER: Doppler parameters were consistent with abnormal left ventricular relaxation (grade 1 diastolic dysfunction)  RIGHT VENTRICLE: The size was normal  Systolic function was normal  Wall thickness was normal     LEFT ATRIUM: Size was normal     RIGHT ATRIUM: Size was normal     MITRAL VALVE: There was mild annular calcification   Valve structure was normal  There was normal leaflet separation  DOPPLER: The transmitral velocity was within the normal range  There was no evidence for stenosis  There was trace  regurgitation  AORTIC VALVE: The valve was not well visualized  DOPPLER: Transaortic velocity was within the normal range  There was no evidence for stenosis  There was mild regurgitation  TRICUSPID VALVE: The valve structure was normal  There was normal leaflet separation  DOPPLER: The transtricuspid velocity was within the normal range  There was no evidence for stenosis  There was trace regurgitation  Pulmonary artery  systolic pressure was moderately increased  Estimated peak PA pressure was 65 mmHg  The findings suggest moderate pulmonary hypertension  PULMONIC VALVE: Not well visualized  DOPPLER: The transpulmonic velocity was within the normal range  There was no significant regurgitation  PERICARDIUM: There was no pericardial effusion  The pericardium was normal in appearance  AORTA: The root exhibited normal size      SYSTEMIC VEINS: IVC: The inferior vena cava was upper normal     SYSTEM MEASUREMENT TABLES    2D  %FS: 30 38 %  AV Diam: 3 11 cm  EDV(Teich): 61 91 ml  EF(Teich): 58 53 %  ESV(Teich): 25 68 ml  IVSd: 1 15 cm  LA Area: 16 7 cm2  LA Diam: 3 58 cm  LVEDV MOD A4C: 81 9 ml  LVEF MOD A4C: 56 03 %  LVESV MOD A4C: 36 01 ml  LVIDd: 3 8 cm  LVIDs: 2 65 cm  LVLd A4C: 6 83 cm  LVLs A4C: 5 86 cm  LVPWd: 1 12 cm  RA Area: 14 56 cm2  RV Diam : 3 27 cm  SV MOD A4C: 45 89 ml  SV(Cube): 36 32 ml  SV(Teich): 36 24 ml    CW  AR Dec Swain: 1 94 m/s2  AR Dec Time: 1840 69 ms  AR PHT: 533 8 ms  AR Vmax: 3 58 m/s  AR maxP 22 mmHg  TR Vmax: 3 49 m/s  TR maxP 16 mmHg    MM  TAPSE: 2 16 cm    PW  E': 0 07 m/s  E/E': 13 57  MV A Rodrigue: 1 44 m/s  MV Dec Swain: 2 9 m/s2  MV DecT: 337 71 ms  MV E Rodrigue: 0 98 m/s  MV E/A Ratio: 0 68    [employer] Accredited Echocardiography Laboratory    Prepared and electronically signed by    Kemi Cobos MD  Signed 07-Oct-2019 21:51:59       No results found for this or any previous visit  No results found for this or any previous visit  No results found for this or any previous visit  Counseling / Coordination of Care  Total floor / unit time spent today 25 minutes

## 2019-10-13 LAB
ANION GAP SERPL CALCULATED.3IONS-SCNC: 7 MMOL/L (ref 4–13)
ANISOCYTOSIS BLD QL SMEAR: PRESENT
BUN SERPL-MCNC: 12 MG/DL (ref 7–25)
C DIFF TOX GENS STL QL NAA+PROBE: NORMAL
CALCIUM SERPL-MCNC: 8.6 MG/DL (ref 8.6–10.5)
CAMPYLOBACTER DNA SPEC NAA+PROBE: NORMAL
CHLORIDE SERPL-SCNC: 103 MMOL/L (ref 98–107)
CO2 SERPL-SCNC: 30 MMOL/L (ref 21–31)
CREAT SERPL-MCNC: 0.52 MG/DL (ref 0.6–1.2)
ERYTHROCYTE [DISTWIDTH] IN BLOOD BY AUTOMATED COUNT: 14.4 % (ref 11.5–14.5)
GFR SERPL CREATININE-BSD FRML MDRD: 89 ML/MIN/1.73SQ M
GLUCOSE SERPL-MCNC: 107 MG/DL (ref 65–99)
HCT VFR BLD AUTO: 28.9 % (ref 42–47)
HGB BLD-MCNC: 9.5 G/DL (ref 12–16)
LYMPHOCYTES # BLD AUTO: 2.23 THOUSAND/UL (ref 0.6–4.47)
LYMPHOCYTES # BLD AUTO: 23 % (ref 20–51)
MCH RBC QN AUTO: 29.1 PG (ref 26–34)
MCHC RBC AUTO-ENTMCNC: 33.1 G/DL (ref 31–37)
MCV RBC AUTO: 88 FL (ref 81–99)
MONOCYTES # BLD AUTO: 0.58 THOUSAND/UL (ref 0–1.22)
MONOCYTES NFR BLD AUTO: 6 % (ref 4–12)
NEUTS BAND NFR BLD MANUAL: 6 % (ref 0–8)
NEUTS SEG # BLD: 6.89 THOUSAND/UL (ref 1.81–6.82)
NEUTS SEG NFR BLD AUTO: 65 % (ref 42–75)
PLATELET # BLD AUTO: 219 THOUSANDS/UL (ref 149–390)
PLATELET BLD QL SMEAR: ADEQUATE
PMV BLD AUTO: 7 FL (ref 8.6–11.7)
POTASSIUM SERPL-SCNC: 3.4 MMOL/L (ref 3.5–5.5)
PROCALCITONIN SERPL-MCNC: 0.05 NG/ML
RBC # BLD AUTO: 3.28 MILLION/UL (ref 3.9–5.2)
RBC MORPH BLD: ABNORMAL
SALMONELLA DNA SPEC QL NAA+PROBE: NORMAL
SHIGA TOXIN STX GENE SPEC NAA+PROBE: NORMAL
SHIGELLA DNA SPEC QL NAA+PROBE: NORMAL
SODIUM SERPL-SCNC: 140 MMOL/L (ref 134–143)
TOTAL CELLS COUNTED SPEC: 100
WBC # BLD AUTO: 9.7 THOUSAND/UL (ref 4.8–10.8)

## 2019-10-13 PROCEDURE — 84145 PROCALCITONIN (PCT): CPT | Performed by: INTERNAL MEDICINE

## 2019-10-13 PROCEDURE — 99232 SBSQ HOSP IP/OBS MODERATE 35: CPT | Performed by: INTERNAL MEDICINE

## 2019-10-13 PROCEDURE — 85007 BL SMEAR W/DIFF WBC COUNT: CPT | Performed by: INTERNAL MEDICINE

## 2019-10-13 PROCEDURE — 94760 N-INVAS EAR/PLS OXIMETRY 1: CPT

## 2019-10-13 PROCEDURE — 94640 AIRWAY INHALATION TREATMENT: CPT

## 2019-10-13 PROCEDURE — 80048 BASIC METABOLIC PNL TOTAL CA: CPT | Performed by: INTERNAL MEDICINE

## 2019-10-13 PROCEDURE — 85027 COMPLETE CBC AUTOMATED: CPT | Performed by: INTERNAL MEDICINE

## 2019-10-13 RX ORDER — METOPROLOL TARTRATE 5 MG/5ML
5 INJECTION INTRAVENOUS ONCE
Status: DISCONTINUED | OUTPATIENT
Start: 2019-10-13 | End: 2019-10-13

## 2019-10-13 RX ORDER — DILTIAZEM HYDROCHLORIDE 5 MG/ML
15 INJECTION INTRAVENOUS ONCE
Status: COMPLETED | OUTPATIENT
Start: 2019-10-13 | End: 2019-10-13

## 2019-10-13 RX ORDER — SODIUM CHLORIDE 9 MG/ML
100 INJECTION, SOLUTION INTRAVENOUS ONCE
Status: COMPLETED | OUTPATIENT
Start: 2019-10-13 | End: 2019-10-13

## 2019-10-13 RX ADMIN — APIXABAN 5 MG: 5 TABLET, FILM COATED ORAL at 09:17

## 2019-10-13 RX ADMIN — DILTIAZEM HYDROCHLORIDE 60 MG: 60 TABLET, FILM COATED ORAL at 23:05

## 2019-10-13 RX ADMIN — IPRATROPIUM BROMIDE AND ALBUTEROL SULFATE 3 ML: 2.5; .5 SOLUTION RESPIRATORY (INHALATION) at 07:22

## 2019-10-13 RX ADMIN — Medication 125 MG: at 05:22

## 2019-10-13 RX ADMIN — DEXTRAN 70 AND HYPROMELLOSE 2910 1 DROP: 1; 3 SOLUTION/ DROPS OPHTHALMIC at 17:39

## 2019-10-13 RX ADMIN — PREDNISONE 30 MG: 10 TABLET ORAL at 09:17

## 2019-10-13 RX ADMIN — Medication 125 MG: at 23:04

## 2019-10-13 RX ADMIN — ACETAMINOPHEN 650 MG: 325 TABLET ORAL at 23:05

## 2019-10-13 RX ADMIN — Medication 125 MG: at 17:39

## 2019-10-13 RX ADMIN — DILTIAZEM HYDROCHLORIDE 60 MG: 60 TABLET, FILM COATED ORAL at 17:39

## 2019-10-13 RX ADMIN — APIXABAN 5 MG: 5 TABLET, FILM COATED ORAL at 17:39

## 2019-10-13 RX ADMIN — ACETAMINOPHEN 650 MG: 325 TABLET ORAL at 12:24

## 2019-10-13 RX ADMIN — DILTIAZEM HYDROCHLORIDE 60 MG: 60 TABLET, FILM COATED ORAL at 05:07

## 2019-10-13 RX ADMIN — Medication 125 MG: at 12:20

## 2019-10-13 RX ADMIN — DILTIAZEM HYDROCHLORIDE 15 MG: 5 INJECTION INTRAVENOUS at 06:53

## 2019-10-13 RX ADMIN — SODIUM CHLORIDE 100 ML/HR: 9 INJECTION, SOLUTION INTRAVENOUS at 07:55

## 2019-10-13 RX ADMIN — FAMOTIDINE 40 MG: 20 TABLET ORAL at 09:17

## 2019-10-13 RX ADMIN — ALPRAZOLAM 0.5 MG: 0.5 TABLET ORAL at 05:07

## 2019-10-13 RX ADMIN — IPRATROPIUM BROMIDE AND ALBUTEROL SULFATE 3 ML: 2.5; .5 SOLUTION RESPIRATORY (INHALATION) at 13:34

## 2019-10-13 RX ADMIN — DEXTRAN 70 AND HYPROMELLOSE 2910 1 DROP: 1; 3 SOLUTION/ DROPS OPHTHALMIC at 12:20

## 2019-10-13 RX ADMIN — DILTIAZEM HYDROCHLORIDE 60 MG: 60 TABLET, FILM COATED ORAL at 12:20

## 2019-10-13 RX ADMIN — IPRATROPIUM BROMIDE AND ALBUTEROL SULFATE 3 ML: 2.5; .5 SOLUTION RESPIRATORY (INHALATION) at 20:26

## 2019-10-13 NOTE — ASSESSMENT & PLAN NOTE
· Patient with continued cough and chronic respiratory failure requiring home oxygen  · Patient has had multiple hospitalizations over the last 3 weeks  · Sputum culture done on last admission showed Haemophilus influenza, MRSA, Klebsiella pneumoniae  · Discussed case with ID, given that patient had normal procalcitonin and is currently afebrile no need for treatment at this time    If patient does develop fever/leukocytosis/worsening symptoms will repeat imaging and treat accordingly

## 2019-10-13 NOTE — PROGRESS NOTES
Progress Note - Gabriela Cristobal 1935, 82 y o  female MRN: [id number]    Unit/Bed#: -02 Encounter: [id number]    Primary Care Provider: Timur Thomas MD   Date and time admitted to hospital: 10/11/2019  6:48 PM        Positive culture findings in sputum  Assessment & Plan  · Patient with continued cough and chronic respiratory failure requiring home oxygen  · Patient has had multiple hospitalizations over the last 3 weeks  · Sputum culture done on last admission showed Haemophilus influenza, MRSA, Klebsiella pneumoniae  · Discussed case with ID, given that patient had normal procalcitonin and is currently afebrile no need for treatment at this time  If patient does develop fever/leukocytosis/worsening symptoms will repeat imaging and treat accordingly    * Atrial fibrillation with rapid ventricular response (HCC)  Assessment & Plan  · Heart rate continues to fluctuate  · Currently off Cardizem drip  · Will continue p o   Cardizem  · Added metoprolol per Cardiology recommendation  · Currently on Eliquis  · Cardiology input appreciated    COPD (chronic obstructive pulmonary disease) ([email or web address] 75 )  Assessment & Plan  · Continue steroid taper  · Continue duo nebs  · Continue oxygen nasal cannula    Debility  Assessment & Plan  · Multifactorial with multiple recent hospitalizations, diarrhea, atrial fibrillation, chronic back pain  · Patient is now agreeable to short-term rehab stay  · Case management for discharge planning  · PT OT for discharge recommendation    [facility] with [facility] )  Assessment & Plan  · Patient wears 2 L oxygen at home    Chronic low back pain  Assessment & Plan  · PT/OT for discharge planning  · Patient was seen by orthopedic team during last admission who recommended outpatient physical therapy and follow up    Diarrhea  Assessment & Plan  · Patient with diarrhea recent colitis on CT  · Check stool studies, rule out C diff  · Continue vanco 125mg zeferino 6 hours until c diff results return      VTE Pharmacologic Prophylaxis: Pharmacologic: Apixaban (Eliquis)    Patient Centered Rounds: I have performed bedside rounds with nursing staff today  Discussions with Specialists or Other Care Team Provider: yes  Education and Discussions with Family / Patient: yes    Current Length of Stay: 2 day(s)    Current Patient Status: Inpatient   Certification Statement: The patient will continue to require additional inpatient hospital stay due to Rapid AFib    Discharge Plan:  Pending hospital course    Code Status: Level 1 - Full Code    Subjective:   Patient noted to rapid heart rate this morning  Patient denies any chest pain or shortness of breath  Patient does have chronic cough  Currently afebrile  Objective:     Vitals:   Temp (24hrs), Av 4 °F (36 3 °C), Min:97 °F (36 1 °C), Max:98 °F (36 7 °C)    Temp:  [97 °F (36 1 °C)-98 °F (36 7 °C)] 97 3 °F (36 3 °C)  HR:  [] 54  Resp:  [17-20] 18  BP: ()/(53-64) 92/54  SpO2:  [96 %-98 %] 96 %  Body mass index is 71 47 kg/m²  Input and Output Summary (last 24 hours): Intake/Output Summary (Last 24 hours) at 10/13/2019 1043  Last data filed at 10/12/2019 1643  Gross per 24 hour   Intake 240 ml   Output 200 ml   Net 40 ml       Physical Exam:     Physical Exam   Constitutional: No distress  Frail elderly female   HENT:   Head: Normocephalic and atraumatic  Eyes: Conjunctivae and EOM are normal    Neck: Normal range of motion  Neck supple  Cardiovascular: Tachycardia present  Pulmonary/Chest: No respiratory distress  Bilateral rhonchi   Abdominal: Soft  She exhibits no distension  There is no tenderness  Musculoskeletal: Normal range of motion  She exhibits no edema  Neurological: She is alert  No cranial nerve deficit  Skin: Skin is warm and dry  Psychiatric: She has a normal mood and affect   Her behavior is normal        Additional Data:     Labs:    Results from last 7 days   Lab Units 10/11/19  1926   WBC Thousand/uL 12 60*   HEMOGLOBIN g/dL 11 0*   HEMATOCRIT % 33 6*   PLATELETS Thousands/uL 272   NEUTROS PCT % 79*   LYMPHS PCT % 14*   MONOS PCT % 8   EOS PCT % 0     Results from last 7 days   Lab Units 10/12/19  0507 10/11/19  1926   POTASSIUM mmol/L 3 6 3 8   CHLORIDE mmol/L 109* 98   CO2 mmol/L 25 27   BUN mg/dL 19 33*   CREATININE mg/dL 0 51* 0 67   CALCIUM mg/dL 7 4* 8 7   ALK PHOS U/L  --  71   ALT U/L  --  32   AST U/L  --  14     Results from last 7 days   Lab Units 10/11/19  1926   INR  1 34               * I Have Reviewed All Lab Data Listed Above  * Additional Pertinent Lab Tests Reviewed: Jacinda 66 Admission  Reviewed    Imaging:  Imaging Reports Reviewed Today Include:  No new imaging    Recent Cultures (last 7 days):     Results from last 7 days   Lab Units 10/11/19  1930 10/09/19  0651   BLOOD CULTURE  No Growth at 24 hrs    No Growth at 24 hrs   --    SPUTUM CULTURE   --  4+ Growth of Haemophilus influenzae*  2+ Growth of Klebsiella pneumoniae*  2+ Growth of Methicillin Resistant Staphylococcus aureus*  2+ Growth of    GRAM STAIN RESULT   --  4+ Polys*  3+ Gram negative coccobacilli*  Rare Gram positive cocci in pairs*  Rare Gram positive rods*       Last 24 Hours Medication List:     Current Facility-Administered Medications:  acetaminophen 650 mg Oral Q4H PRN Da Cole PA-C   albuterol 2 5 mg Nebulization Q4H PRN Praveen White MD   ALPRAZolam 0 5 mg Oral HS PRN Da Cole PA-C   apixaban 5 mg Oral BID Da Cole PA-C   dextran 70-hypromellose 1 drop Both Eyes Q3H PRN Kory Mendoza MD   diltiazem 60 mg Oral Q6H [facility] Christina Lee PA-C   famotidine 40 mg Oral Daily Da Cole PA-C   ipratropium-albuterol 3 mL Nebulization Q6H While awake Praveen White MD   metoprolol 5 mg Intravenous Once Kory Mendoza MD   metoprolol tartrate 25 mg Oral Q12H 130 Ruosman Olmos MD   ondansetron 4 mg Intravenous Q6H PRN Da Cole CELSO   predniSONE 30 mg Oral Daily Andrés Martinez MD   vancomycin 125 mg Oral Q6H [facility] Lisseth Ken PA-C        Today, Patient Was Seen By: Andrés Martinez MD    ** Please Note: Dictation voice to text software may have been used in the creation of this document   **

## 2019-10-13 NOTE — PLAN OF CARE
Problem: Potential for Falls  Goal: Patient will remain free of falls  Description  INTERVENTIONS:  - Assess patient frequently for physical needs  -  Identify cognitive and physical deficits and behaviors that affect risk of falls    -  [place] fall precautions as indicated by assessment   - Educate patient/family on patient safety including physical limitations  - Instruct patient to call for assistance with activity based on assessment  - Modify environment to reduce risk of injury  - Consider OT/PT consult to assist with strengthening/mobility  Outcome: Progressing     Problem: Prexisting or High Potential for Compromised Skin Integrity  Goal: Skin integrity is maintained or improved  Description  INTERVENTIONS:  - Identify patients at risk for skin breakdown  - Assess and monitor skin integrity  - Assess and monitor nutrition and hydration status  - Monitor labs   - Assess for incontinence   - Turn and reposition patient  - Assist with mobility/ambulation  - Relieve pressure over bony prominences  - Avoid friction and shearing  - Provide appropriate hygiene as needed including keeping skin clean and dry  - Evaluate need for skin moisturizer/barrier cream  - Collaborate with interdisciplinary team   - Patient/family teaching  - Consider wound care consult   Outcome: Progressing     Problem: PAIN - ADULT  Goal: Verbalizes/displays adequate comfort level or baseline comfort level  Description  Interventions:  - Encourage patient to monitor pain and request assistance  - Assess pain using appropriate pain scale  - Administer analgesics based on type and severity of pain and evaluate response  - Implement non-pharmacological measures as appropriate and evaluate response  - Consider cultural and social influences on pain and pain management  - Notify physician/advanced practitioner if interventions unsuccessful or patient reports new pain  Outcome: Progressing     Problem: INFECTION - ADULT  Goal: Absence or prevention of progression during hospitalization  Description  INTERVENTIONS:  - Assess and monitor for signs and symptoms of infection  - Monitor lab/diagnostic results  - Monitor all insertion sites, i e  indwelling lines, tubes, and drains  - Monitor endotracheal if appropriate and nasal secretions for changes in amount and color  - [place] appropriate cooling/warming therapies per order  - Administer medications as ordered  - Instruct and encourage patient and family to use good hand hygiene technique  - Identify and instruct in appropriate isolation precautions for identified infection/condition  Outcome: Progressing  Goal: Absence of fever/infection during neutropenic period  Description  INTERVENTIONS:  - Monitor WBC    Outcome: Progressing     Problem: SAFETY ADULT  Goal: Maintain or return to baseline ADL function  Description  INTERVENTIONS:  -  Assess patient's ability to carry out ADLs; assess patient's baseline for ADL function and identify physical deficits which impact ability to perform ADLs (bathing, care of mouth/teeth, toileting, grooming, dressing, etc )  - Assess/evaluate cause of self-care deficits   - Assess range of motion  - Assess patient's mobility; develop plan if impaired  - Assess patient's need for assistive devices and provide as appropriate  - Encourage maximum independence but intervene and supervise when necessary  - Involve family in performance of ADLs  - Assess for home care needs following discharge   - Consider OT consult to assist with ADL evaluation and planning for discharge  - Provide patient education as appropriate  Outcome: Progressing  Goal: Maintain or return mobility status to optimal level  Description  INTERVENTIONS:  - Assess patient's baseline mobility status (ambulation, transfers, stairs, etc )    - Identify cognitive and physical deficits and behaviors that affect mobility  - Identify mobility aids required to assist with transfers and/or ambulation (gait belt, sit-to-stand, lift, walker, cane, etc )  - [place] fall precautions as indicated by assessment  - Record patient progress and toleration of activity level on Mobility SBAR; progress patient to next Phase/Stage  - Instruct patient to call for assistance with activity based on assessment  - Consider rehabilitation consult to assist with strengthening/weightbearing, etc   Outcome: Progressing     Problem: DISCHARGE PLANNING  Goal: Discharge to home or other facility with appropriate resources  Description  INTERVENTIONS:  - Identify barriers to discharge w/patient and caregiver  - Arrange for needed discharge resources and transportation as appropriate  - Identify discharge learning needs (meds, wound care, etc )  - Arrange for interpretive services to assist at discharge as needed  - Refer to Case Management Department for coordinating discharge planning if the patient needs post-hospital services based on physician/advanced practitioner order or complex needs related to functional status, cognitive ability, or social support system  Outcome: Progressing     Problem: Knowledge Deficit  Goal: Patient/family/caregiver demonstrates understanding of disease process, treatment plan, medications, and discharge instructions  Description  Complete learning assessment and assess knowledge base    Interventions:  - Provide teaching at level of understanding  - Provide teaching via preferred learning methods  Outcome: Progressing     Problem: DISCHARGE PLANNING - CARE MANAGEMENT  Goal: Discharge to post-acute care or home with appropriate resources  Description  INTERVENTIONS:  - Conduct assessment to determine patient/family and health care team treatment goals, and need for post-acute services based on payer coverage, community resources, and patient preferences, and barriers to discharge  - Address psychosocial, clinical, and financial barriers to discharge as identified in assessment in conjunction with the patient/family and health care team  - Arrange appropriate level of post-acute services according to patient's   needs and preference and payer coverage in collaboration with the physician and health care team  - Communicate with and update the patient/family, physician, and health care team regarding progress on the discharge plan  - Arrange appropriate transportation to post-acute venues    Pt's goal is to return home after some rehab   Outcome: Progressing

## 2019-10-13 NOTE — ASSESSMENT & PLAN NOTE
· Heart rate continues to fluctuate  · Currently off Cardizem drip  · Will continue p o   Cardizem  · Added metoprolol per Cardiology recommendation  · Currently on Eliquis  · Cardiology input appreciated

## 2019-10-13 NOTE — PROGRESS NOTES
Cardiology Progress Note - Pastor Garcia 82 y o  female MRN: [id number]    Unit/Bed#: -02 Encounter: [id number]      Assessment:  Assessment and Plan  1  Atrial fibrillation, recently diagnosed  2  Diarrhea  · C diff testing pending  · Sputum culture positive for H influenza  · ?drug-related, says diarrhea started after initiating medication for back pain  3  COPD on home O2  4  Frequent ED visits, 4 ED visits in 10 days, refuses to go to rehab    Plan  Atrial fibrillation  · ECG from 10/11/2019 showed atrial fibrillation, with nonspecific ST-T changes  She was previously in sinus rhythm on 10/9/19  · Secondary to frequent diarrhea/infection  · Home cardizem dose was  mg  · Got IV Cardizem 15 mg, followed by Cardizem drip at 5 to call 0 5 milligrams/hour for about 5 hours, and then was switched to oral Cardizem at increased dose of 60 mg Q 6  · Heart rates now much better, after significant IV fluids at admission  · Can switch to Cardizem  mg once acute infection symptoms are better  · Had more atrial fibrillation this morning for about 4 hours from 5:30 a m  To 9:30 a m , with heart rates in the 150s to 160s  · Got IV fluids, due to concern for possible dehydration related to diarrhea  · Will add metoprolol 25 mg b i d    · On Eliquis 5 mg b i d  For anticoagulation      Subjective:    No significant events overnight  Had another episode of rapid atrial fibrillation this morning from 5:30 a m  To 9:30 a m  With heart rate going up to 160s  This apparently happened after she was having issues with needle stick in the morning  She does not report any symptoms with the same,    Review of Systems  No c/o chest pain, No c/o palpitations, No c/o shortness of breath, No c/o dizziness or light-headedness, No c/o abdominal pain, no c/o nausea/vomitting  All other systems negative    Telemetry Review:  Atrial fibrillation for 4 hours this morning, normal sinus rhythm with PACs   PVCs for    Objective:   Vitals: Blood pressure 98/54, pulse 55, temperature 97 8 °F (36 6 °C), temperature source Temporal, resp  rate 18, height 5' (1 524 m), weight (!) 165 kg (363 lb 12 1 oz), SpO2 98 %, not currently breastfeeding , Body mass index is 71 04 kg/m² ,   Orthostatic Blood Pressures      Most Recent Value   Blood Pressure  98/54 filed at 10/13/2019 1513   Patient Position - Orthostatic VS  Sitting filed at 10/13/2019 7968         Systolic (56DCI), MUO:913 , Min:92 , NFS:484     Diastolic (38AEB), DII:37, Min:48, Max:64    Wt Readings from Last 5 Encounters:   10/13/19 (!) 165 kg (363 lb 12 1 oz)   10/09/19 77 3 kg (170 lb 6 7 oz)   10/08/19 74 7 kg (164 lb 10 9 oz)   10/04/19 76 1 kg (167 lb 12 3 oz)   09/29/19 77 1 kg (170 lb)     I/O       10/11 0701 - 10/12 0700 10/12 0701 - 10/13 0700 10/13 0701 - 10/14 0700    P  O   240 360    I V  (mL/kg) 54 2 (0 3) 700 (4 2)     IV Piggyback 3450      Total Intake(mL/kg) 3504 2 (21 1) 940 (5 7) 360 (2 2)    Urine (mL/kg/hr) 400 200 (0 1) 600 (0 4)    Stool  0 0    Total Output 400 200 600    Net +3104 2 +740 -240           Unmeasured Urine Occurrence 1 x 2 x     Unmeasured Stool Occurrence  2000 x 1 x              Physical Exam    Constitutional:  Donis Just appears well, alert and oriented x 3, pleasant and cooperative  No acute distress   HEENT:    Normocephalic, atraumatic   Neck:  Supple, No JVD   Lungs:  Clear to auscultation bilaterally, respirations unlabored    Chest Wall:  No tenderness or deformity    Heart::  Regular rate, Regular rhythm, S1 and S2 normal, no murmur, rub or gallop    Abdomen:  Soft, non-tender, non-distended   Neurological:  Awake, alert, oriented x3   Non-focal exam    Extremities:  No pedal edema, No calf tenderness         Laboratory Results:        CBC with diff:   Results from last 7 days   Lab Units 10/13/19  0618 10/11/19  1926 10/09/19  0651 10/07/19  0502   WBC Thousand/uL 9 70 12 60* 11 20* 9 00   HEMOGLOBIN g/dL 9 5* 11 0* 12 7 12 5   HEMATOCRIT % 28 9* 33 6* 38 0* 39 1*   MCV fL 88 86 87 88   PLATELETS Thousands/uL 219 272 198 210   MCH pg 29 1 28 2 29 0 28 1   MCHC g/dL 33 1 32 8 33 3 32 0   RDW % 14 4 14 6* 14 0 14 6*   MPV fL 7 0* 7 1* 7 5* 7 4*         CMP:  Results from last 7 days   Lab Units 10/13/19  0618 10/12/19  0507 10/11/19  1926 10/09/19  0651 10/07/19  0502   POTASSIUM mmol/L 3 4* 3 6 3 8 4 4 4 1   CHLORIDE mmol/L 103 109* 98 97* 103   CO2 mmol/L 30 25 27 30 31   BUN mg/dL 12 19 33* 12 7   CREATININE mg/dL 0 52* 0 51* 0 67 0 51* 0 46*   CALCIUM mg/dL 8 6 7 4* 8 7 8 7 8 3*   AST U/L  --   --  14 15  --    ALT U/L  --   --  32 25  --    ALK PHOS U/L  --   --  71 72  --    EGFR ml/min/1 73sq m 89 90 82 90 93         BMP:  Results from last 7 days   Lab Units 10/13/19  0618 10/12/19  0507 10/11/19  1926 10/09/19  0651 10/07/19  0502   POTASSIUM mmol/L 3 4* 3 6 3 8 4 4 4 1   CHLORIDE mmol/L 103 109* 98 97* 103   CO2 mmol/L 30 25 27 30 31   BUN mg/dL 12 19 33* 12 7   CREATININE mg/dL 0 52* 0 51* 0 67 0 51* 0 46*   CALCIUM mg/dL 8 6 7 4* 8 7 8 7 8 3*       BNP: No results for input(s): BNP in the last 72 hours      Magnesium:   Results from last 7 days   Lab Units 10/12/19  0507   MAGNESIUM mg/dL 2 1       Coags:   Results from last 7 days   Lab Units 10/11/19  1926   PTT seconds 28   INR  1 34       TSH:        Hemoglobin A1C       Lipid Profile:       Meds/Allergies   all current active meds have been reviewed and current meds:   Current Facility-Administered Medications   Medication Dose Route Frequency    acetaminophen (TYLENOL) tablet 650 mg  650 mg Oral Q4H PRN    albuterol inhalation solution 2 5 mg  2 5 mg Nebulization Q4H PRN    ALPRAZolam (XANAX) tablet 0 5 mg  0 5 mg Oral HS PRN    apixaban (ELIQUIS) tablet 5 mg  5 mg Oral BID    dextran 70-hypromellose (GENTEAL TEARS) 0 1-0 3 % ophthalmic solution 1 drop  1 drop Both Eyes Q3H PRN    diltiazem (CARDIZEM) tablet 60 mg  60 mg Oral Q6H [address]    famotidine (PEPCID) tablet 40 mg  40 mg Oral Daily    ipratropium-albuterol (DUO-NEB) 0 5-2 5 mg/3 mL inhalation solution 3 mL  3 mL Nebulization Q6H While awake    ondansetron (ZOFRAN) injection 4 mg  4 mg Intravenous Q6H PRN    predniSONE tablet 30 mg  30 mg Oral Daily    vancomycin (VANCOCIN) oral solution 125 mg  125 mg Oral Q6H [facility] longterm     Medications Prior to Admission   Medication    apixaban (ELIQUIS) 5 mg    fluticasone-umeclidinium-vilanterol (TRELEGY ELLIPTA) 100-62 5-25 MCG/INH inhaler    furosemide (LASIX) 20 mg tablet    ipratropium (ATROVENT) 0 02 % nebulizer solution    montelukast (SINGULAIR) 10 mg tablet    potassium chloride (MICRO-K) 10 MEQ CR capsule    predniSONE 20 mg tablet    Albuterol Sulfate 108 (90 Base) MCG/ACT AEPB    alendronate (FOSAMAX) 70 mg tablet    ALPRAZolam (XANAX) 0 5 mg tablet    Calcium Carb-Cholecalciferol (CALCIUM 1000 + D PO)    diltiazem (CARDIZEM CD) 180 mg 24 hr capsule    famotidine (PEPCID) 40 MG tablet    ipratropium-albuterol (COMBIVENT RESPIMAT) inhaler            Cardiac testing:   Results for orders placed during the hospital encounter of 10/06/19   Echo complete with contrast if indicated    [place] [id number]    Transthoracic Echocardiogram  2D, M-mode, Doppler, and Color Doppler    Study date:  07-Oct-2019    Patient: Pretty Cronin  MR number: [id number]  Account number: [de-identified]  : 1935  Age: 82 years  Gender: Female  Status: Inpatient  [facility]   Height: 60 in  Weight: 164 lb  BP: 88/ 53 mmHg    Indications: Afib    Diagnoses: I48 0 - Atrial fibrillation    Sonographer:  Luiza Reynoso RAUL  Referring Physician:  Jerman Decker MD  Group:  [facility]  Interpreting Physician:  Lynette Moss MD    SUMMARY    LEFT VENTRICLE:  Systolic function was normal  Ejection fraction was estimated to be 60 %   There were no regional wall motion abnormalities  There was mild concentric hypertrophy  Doppler parameters were consistent with abnormal left ventricular relaxation (grade 1 diastolic dysfunction)  MITRAL VALVE:  There was mild annular calcification  There was trace regurgitation  AORTIC VALVE:  There was mild regurgitation  TRICUSPID VALVE:  There was trace regurgitation  HISTORY: PRIOR HISTORY: Congestive heart failure  Atrial fibrillation  Chronic lung disease  PROCEDURE: The study was performed in the [facility]  This was a routine study  The transthoracic approach was used  The study included complete 2D imaging, M-mode, complete spectral Doppler, and color Doppler  The  heart rate was 92 bpm, at the start of the study  Images were obtained from the parasternal, apical, subcostal, and suprasternal notch acoustic windows  Echocardiographic views were limited due to poor acoustic window availability,  decreased penetration, and lung interference  This was a technically difficult study  LEFT VENTRICLE: Size was normal  Systolic function was normal  Ejection fraction was estimated to be 60 %  There were no regional wall motion abnormalities  Wall thickness was mildly increased  There was mild concentric hypertrophy  DOPPLER: Doppler parameters were consistent with abnormal left ventricular relaxation (grade 1 diastolic dysfunction)  RIGHT VENTRICLE: The size was normal  Systolic function was normal  Wall thickness was normal     LEFT ATRIUM: Size was normal     RIGHT ATRIUM: Size was normal     MITRAL VALVE: There was mild annular calcification  Valve structure was normal  There was normal leaflet separation  DOPPLER: The transmitral velocity was within the normal range  There was no evidence for stenosis  There was trace  regurgitation  AORTIC VALVE: The valve was not well visualized  DOPPLER: Transaortic velocity was within the normal range   There was no evidence for stenosis  There was mild regurgitation  TRICUSPID VALVE: The valve structure was normal  There was normal leaflet separation  DOPPLER: The transtricuspid velocity was within the normal range  There was no evidence for stenosis  There was trace regurgitation  Pulmonary artery  systolic pressure was moderately increased  Estimated peak PA pressure was 65 mmHg  The findings suggest moderate pulmonary hypertension  PULMONIC VALVE: Not well visualized  DOPPLER: The transpulmonic velocity was within the normal range  There was no significant regurgitation  PERICARDIUM: There was no pericardial effusion  The pericardium was normal in appearance  AORTA: The root exhibited normal size  SYSTEMIC VEINS: IVC: The inferior vena cava was upper normal     SYSTEM MEASUREMENT TABLES    2D  %FS: 30 38 %  AV Diam: 3 11 cm  EDV(Teich): 61 91 ml  EF(Teich): 58 53 %  ESV(Teich): 25 68 ml  IVSd: 1 15 cm  LA Area: 16 7 cm2  LA Diam: 3 58 cm  LVEDV MOD A4C: 81 9 ml  LVEF MOD A4C: 56 03 %  LVESV MOD A4C: 36 01 ml  LVIDd: 3 8 cm  LVIDs: 2 65 cm  LVLd A4C: 6 83 cm  LVLs A4C: 5 86 cm  LVPWd: 1 12 cm  RA Area: 14 56 cm2  RV Diam : 3 27 cm  SV MOD A4C: 45 89 ml  SV(Cube): 36 32 ml  SV(Teich): 36 24 ml    CW  AR Dec [place]: 1 94 m/s2  AR Dec Time: 1840 69 ms  AR PHT: 533 8 ms  AR Vmax: 3 58 m/s  AR maxP 22 mmHg  TR Vmax: 3 49 m/s  TR maxP 16 mmHg    MM  TAPSE: 2 16 cm    PW  E': 0 07 m/s  E/E': 13 57  MV A Rodrigue: 1 44 m/s  MV Dec [place]: 2 9 m/s2  MV DecT: 337 71 ms  MV E Rodrigue: 0 98 m/s  MV E/A Ratio: 0 68    [employer] Accredited Echocardiography Laboratory    Prepared and electronically signed by    Shima Warner MD  Signed 07-Oct-2019 21:51:59       No results found for this or any previous visit  No results found for this or any previous visit  No results found for this or any previous visit  Counseling / Coordination of Care  Total floor / unit time spent today 25 minutes  Alexandra Patino

## 2019-10-14 LAB
ANION GAP SERPL CALCULATED.3IONS-SCNC: 8 MMOL/L (ref 4–13)
ATRIAL RATE: 138 BPM
BASOPHILS # BLD AUTO: 0 THOUSANDS/ΜL (ref 0–0.1)
BASOPHILS NFR BLD AUTO: 0 % (ref 0–2)
BUN SERPL-MCNC: 10 MG/DL (ref 7–25)
CALCIUM SERPL-MCNC: 9.1 MG/DL (ref 8.6–10.5)
CHLORIDE SERPL-SCNC: 103 MMOL/L (ref 98–107)
CO2 SERPL-SCNC: 29 MMOL/L (ref 21–31)
CREAT SERPL-MCNC: 0.55 MG/DL (ref 0.6–1.2)
EOSINOPHIL # BLD AUTO: 0 THOUSAND/ΜL (ref 0–0.61)
EOSINOPHIL NFR BLD AUTO: 0 % (ref 0–5)
ERYTHROCYTE [DISTWIDTH] IN BLOOD BY AUTOMATED COUNT: 14.2 % (ref 11.5–14.5)
GFR SERPL CREATININE-BSD FRML MDRD: 88 ML/MIN/1.73SQ M
GLUCOSE SERPL-MCNC: 124 MG/DL (ref 65–99)
HCT VFR BLD AUTO: 29.9 % (ref 42–47)
HGB BLD-MCNC: 9.8 G/DL (ref 12–16)
LYMPHOCYTES # BLD AUTO: 1.8 THOUSANDS/ΜL (ref 0.6–4.47)
LYMPHOCYTES NFR BLD AUTO: 16 % (ref 21–51)
MCH RBC QN AUTO: 28.6 PG (ref 26–34)
MCHC RBC AUTO-ENTMCNC: 32.9 G/DL (ref 31–37)
MCV RBC AUTO: 87 FL (ref 81–99)
MONOCYTES # BLD AUTO: 0.7 THOUSAND/ΜL (ref 0.17–1.22)
MONOCYTES NFR BLD AUTO: 6 % (ref 2–12)
NEUTROPHILS # BLD AUTO: 8.8 THOUSANDS/ΜL (ref 1.4–6.5)
NEUTS SEG NFR BLD AUTO: 77 % (ref 42–75)
PLATELET # BLD AUTO: 254 THOUSANDS/UL (ref 149–390)
PMV BLD AUTO: 7.2 FL (ref 8.6–11.7)
POTASSIUM SERPL-SCNC: 3.8 MMOL/L (ref 3.5–5.5)
PROCALCITONIN SERPL-MCNC: <0.05 NG/ML
QRS AXIS: 6 DEGREES
QRSD INTERVAL: 40 MS
QT INTERVAL: 252 MS
QTC INTERVAL: 395 MS
RBC # BLD AUTO: 3.44 MILLION/UL (ref 3.9–5.2)
SODIUM SERPL-SCNC: 140 MMOL/L (ref 134–143)
T WAVE AXIS: 267 DEGREES
VENTRICULAR RATE: 148 BPM
WBC # BLD AUTO: 11.4 THOUSAND/UL (ref 4.8–10.8)

## 2019-10-14 PROCEDURE — G8978 MOBILITY CURRENT STATUS: HCPCS

## 2019-10-14 PROCEDURE — 84145 PROCALCITONIN (PCT): CPT | Performed by: INTERNAL MEDICINE

## 2019-10-14 PROCEDURE — 94760 N-INVAS EAR/PLS OXIMETRY 1: CPT

## 2019-10-14 PROCEDURE — 80048 BASIC METABOLIC PNL TOTAL CA: CPT | Performed by: INTERNAL MEDICINE

## 2019-10-14 PROCEDURE — 97163 PT EVAL HIGH COMPLEX 45 MIN: CPT

## 2019-10-14 PROCEDURE — 93010 ELECTROCARDIOGRAM REPORT: CPT | Performed by: INTERNAL MEDICINE

## 2019-10-14 PROCEDURE — G8979 MOBILITY GOAL STATUS: HCPCS

## 2019-10-14 PROCEDURE — 99232 SBSQ HOSP IP/OBS MODERATE 35: CPT | Performed by: HOSPITALIST

## 2019-10-14 PROCEDURE — 94640 AIRWAY INHALATION TREATMENT: CPT

## 2019-10-14 PROCEDURE — 99232 SBSQ HOSP IP/OBS MODERATE 35: CPT | Performed by: INTERNAL MEDICINE

## 2019-10-14 PROCEDURE — 85025 COMPLETE CBC W/AUTO DIFF WBC: CPT | Performed by: INTERNAL MEDICINE

## 2019-10-14 RX ORDER — DILTIAZEM HYDROCHLORIDE 240 MG/1
240 CAPSULE, COATED, EXTENDED RELEASE ORAL DAILY
Status: DISCONTINUED | OUTPATIENT
Start: 2019-10-14 | End: 2019-10-15 | Stop reason: HOSPADM

## 2019-10-14 RX ORDER — LOPERAMIDE HYDROCHLORIDE 2 MG/1
2 CAPSULE ORAL 3 TIMES DAILY PRN
Status: DISCONTINUED | OUTPATIENT
Start: 2019-10-14 | End: 2019-10-15 | Stop reason: HOSPADM

## 2019-10-14 RX ADMIN — DILTIAZEM HYDROCHLORIDE 60 MG: 60 TABLET, FILM COATED ORAL at 05:16

## 2019-10-14 RX ADMIN — FAMOTIDINE 40 MG: 20 TABLET ORAL at 09:16

## 2019-10-14 RX ADMIN — METOPROLOL TARTRATE 25 MG: 25 TABLET, FILM COATED ORAL at 11:48

## 2019-10-14 RX ADMIN — DEXTRAN 70 AND HYPROMELLOSE 2910 1 DROP: 1; 3 SOLUTION/ DROPS OPHTHALMIC at 09:16

## 2019-10-14 RX ADMIN — IPRATROPIUM BROMIDE AND ALBUTEROL SULFATE 3 ML: 2.5; .5 SOLUTION RESPIRATORY (INHALATION) at 19:42

## 2019-10-14 RX ADMIN — APIXABAN 5 MG: 5 TABLET, FILM COATED ORAL at 09:16

## 2019-10-14 RX ADMIN — ACETAMINOPHEN 650 MG: 325 TABLET ORAL at 21:14

## 2019-10-14 RX ADMIN — Medication 125 MG: at 05:17

## 2019-10-14 RX ADMIN — IPRATROPIUM BROMIDE AND ALBUTEROL SULFATE 3 ML: 2.5; .5 SOLUTION RESPIRATORY (INHALATION) at 13:41

## 2019-10-14 RX ADMIN — LOPERAMIDE HYDROCHLORIDE 2 MG: 2 CAPSULE ORAL at 14:01

## 2019-10-14 RX ADMIN — LOPERAMIDE HYDROCHLORIDE 2 MG: 2 CAPSULE ORAL at 20:43

## 2019-10-14 RX ADMIN — APIXABAN 5 MG: 5 TABLET, FILM COATED ORAL at 18:04

## 2019-10-14 RX ADMIN — ALPRAZOLAM 0.5 MG: 0.5 TABLET ORAL at 09:16

## 2019-10-14 RX ADMIN — IPRATROPIUM BROMIDE AND ALBUTEROL SULFATE 3 ML: 2.5; .5 SOLUTION RESPIRATORY (INHALATION) at 07:14

## 2019-10-14 RX ADMIN — DILTIAZEM HYDROCHLORIDE 240 MG: 240 CAPSULE, COATED, EXTENDED RELEASE ORAL at 11:47

## 2019-10-14 RX ADMIN — ALPRAZOLAM 0.5 MG: 0.5 TABLET ORAL at 23:55

## 2019-10-14 RX ADMIN — PREDNISONE 30 MG: 10 TABLET ORAL at 09:16

## 2019-10-14 RX ADMIN — ACETAMINOPHEN 650 MG: 325 TABLET ORAL at 05:12

## 2019-10-14 NOTE — ASSESSMENT & PLAN NOTE
· Status post PT and OT evaluation  · Needs a short-term rehab  · Case management is working on final placement

## 2019-10-14 NOTE — SOCIAL WORK
Patient accepted by the [facility]   Anticipated discharge in am  [facility]mber at [facility] notified

## 2019-10-14 NOTE — PLAN OF CARE
Problem: PHYSICAL THERAPY ADULT  Goal: Performs mobility at highest level of function for planned discharge setting  See evaluation for individualized goals  Description  Treatment/Interventions: Functional transfer training, LE strengthening/ROM, Therapeutic exercise, Endurance training, Equipment eval/education, Gait training, Compensatory technique education, Spoke to nursing, Elevations, Spoke to case management  Equipment Recommended: William Paget       See flowsheet documentation for full assessment, interventions and recommendations  Note:   Prognosis: Fair  Problem List: Decreased strength, Decreased endurance, Impaired balance, Decreased mobility, Pain  Assessment: Pt is 82 y o  female seen for PT evaluation on 10/14/2019 s/p admit to [address] on 10/11/2019 w/ Atrial fibrillation with rapid ventricular response ([address] )  PT consulted to assess pt's functional mobility and d/c needs  Order placed for PT eval and tx, w/ up and OOB as tolerated order  Performed at least 2 patient identifiers during session: Name and wristband  Comorbidities affecting pt's physical performance at time of assessment include: Afib, COPD, Anxiety, chronic low back pain, active MRSA   PTA, pt was lives alone in 1 level home  Personal factors affecting pt at time of IE include: inability to navigate community distances, unable to perform dynamic tasks in community, limited home support, anxiety and unable to perform physical activity  Please find objective findings from PT assessment regarding body systems outlined above with impairments and limitations including weakness, impaired balance, decreased endurance, gait deviations, pain, decreased activity tolerance, decreased functional mobility tolerance and fall risk, as well as mobility assessment (need for minimal  assist w/ all phases of mobility when usually ambulating independently)   The following objective measures performed on IE also reveal limitations: Barthel Index: 40/100  Pt's clinical presentation is currently unstable/unpredictable seen in pt's presentation of need for minimum x1  assist w/ all phases of mobility when usually mobilizing independently  Pt to benefit from continued PT tx to address deficits as defined above and maximize level of functional independent mobility and consistency  From PT/mobility standpoint, recommendation at time of d/c would be STR pending progress in order to facilitate return to PLOF  Recommendation: Short-term skilled PT     PT - OK to Discharge: Yes(when medically stable)    See flowsheet documentation for full assessment

## 2019-10-14 NOTE — NURSING NOTE
Diltiazem (CARDIZEM) tablet 60 mg fell on patient's floor when opening package  Pill 'wasted' in [employer]    New diltiazem (CARDIZEM) tablet 60 mg pulled from [employer] and given to patient po at 0507 on 10/13/2019

## 2019-10-14 NOTE — ASSESSMENT & PLAN NOTE
· Patient with diarrhea recent colitis on CT  · Stool cultures are negative  · Stool for C diff is negative  · Patient is afebrile, no leukocytosis and procalcitonin testing is within normal limits  · Okay for Imodium use

## 2019-10-14 NOTE — PROGRESS NOTES
Cardiology Progress Note - Sravani Nava 82 y o  female MRN: [id number]    Unit/Bed#: -02 Encounter: [id number]      Assessment:  1  Paroxysmal Atrial Fibrillation, recently diagnosed  2  Diarrhea  · C diff testing pending  · Sputum culture positive for H influenza  · ?drug-related, says diarrhea started after initiating medication for back pain  3  COPD on home O2  4  Frequent ED visits, 4 ED visits in 10 days, refuses to go to rehab    Plan  Paroxysmal Atrial Fibrillation   · ECG from 10/11/2019 showed atrial fibrillation, with nonspecific ST-T changes  She was previously in sinus rhythm on 10/9/19  · Secondary to frequent diarrhea/infection  · Home cardizem dose was  mg  · Had another recurrence yesterday with heart rates in the 150s to 160s  · Got IV fluids & converted back to NSR around 9:20 AM oin 10/14/19  · No further episodes of atrial fibrillation  · Switch to Cardizem  mg   · Add metoprolol 25 mg b i d    · On Eliquis 5 mg b i d  For anticoagulation    Outpatient follow-up with me in the office in 2-4 weeks  We will sign off for now  Patient otherwise stable for discharge from medical and cardiac standpoint, and is awaiting safe discharge plan with case management    Subjective:    No significant events overnight  No further episodes of atrial fibrillation, otherwise doing better  Review of Systems  No c/o chest pain, No c/o palpitations, No c/o shortness of breath, No c/o dizziness or light-headedness, No c/o abdominal pain, no c/o nausea/vomitting  All other systems negative    Telemetry Review:  Atrial fibrillation for 4 hours yesterday morning, remains normal sinus rhythm since then  PVCs, slow NSVT v/s idioventricular rhythm in morning    Objective:   Vitals: Blood pressure 134/84, pulse 88, temperature 98 7 °F (37 1 °C), temperature source Tympanic, resp   rate 20, height 5' (1 524 m), weight (!) 165 kg (363 lb 12 1 oz), SpO2 98 %, not currently breastfeeding , Body mass index is 71 04 kg/m² ,   Orthostatic Blood Pressures      Most Recent Value   Blood Pressure  134/84 filed at 10/14/2019 0803   Patient Position - Orthostatic VS  Lying filed at 10/14/2019 2264         Systolic (90VQW), [place]:705 , Min:98 , UEV:433     Diastolic (91KNY), PSL:40, Min:48, Max:84    Wt Readings from Last 5 Encounters:   10/13/19 (!) 165 kg (363 lb 12 1 oz)   10/09/19 77 3 kg (170 lb 6 7 oz)   10/08/19 74 7 kg (164 lb 10 9 oz)   10/04/19 76 1 kg (167 lb 12 3 oz)   09/29/19 77 1 kg (170 lb)     I/O       10/11 0701 - 10/12 0700 10/12 0701 - 10/13 0700 10/13 0701 - 10/14 0700    P  O   240 360    I V  (mL/kg) 54 2 (0 3) 700 (4 2)     IV Piggyback 3450      Total Intake(mL/kg) 3504 2 (21 1) 940 (5 7) 360 (2 2)    Urine (mL/kg/hr) 400 200 (0 1) 600 (0 4)    Stool  0 0    Total Output 400 200 600    Net +3104 2 +740 -240           Unmeasured Urine Occurrence 1 x 2 x     Unmeasured Stool Occurrence  2000 x 1 x              Physical Exam    Constitutional:  Sukumar Marie appears well, alert and oriented x 3, pleasant and cooperative  No acute distress   HEENT:    Normocephalic, atraumatic   Neck:  Supple, No JVD   Lungs:  Clear to auscultation bilaterally, respirations unlabored    Chest Wall:  No tenderness or deformity    Heart::  Regular rate, Regular rhythm, S1 and S2 normal, no murmur, rub or gallop    Abdomen:  Soft, non-tender, non-distended   Neurological:  Awake, alert, oriented x3   Non-focal exam    Extremities:  No pedal edema, No calf tenderness         Laboratory Results:        CBC with diff:   Results from last 7 days   Lab Units 10/14/19  0529 10/13/19  0618 10/11/19  1926 10/09/19  0651   WBC Thousand/uL 11 40* 9 70 12 60* 11 20*   HEMOGLOBIN g/dL 9 8* 9 5* 11 0* 12 7   HEMATOCRIT % 29 9* 28 9* 33 6* 38 0*   MCV fL 87 88 86 87   PLATELETS Thousands/uL 254 219 272 198   MCH pg 28 6 29 1 28 2 29 0   MCHC g/dL 32 9 33 1 32 8 33 3   RDW % 14 2 14 4 14 6* 14 0   MPV fL 7 2* 7 0* 7 1* 7 5* CMP:  Results from last 7 days   Lab Units 10/14/19  0529 10/13/19  0618 10/12/19  0507 10/11/19  1926 10/09/19  0651   POTASSIUM mmol/L 3 8 3 4* 3 6 3 8 4 4   CHLORIDE mmol/L 103 103 109* 98 97*   CO2 mmol/L 29 30 25 27 30   BUN mg/dL 10 12 19 33* 12   CREATININE mg/dL 0 55* 0 52* 0 51* 0 67 0 51*   CALCIUM mg/dL 9 1 8 6 7 4* 8 7 8 7   AST U/L  --   --   --  14 15   ALT U/L  --   --   --  32 25   ALK PHOS U/L  --   --   --  71 72   EGFR ml/min/1 73sq m 88 89 90 82 90         BMP:  Results from last 7 days   Lab Units 10/14/19  0529 10/13/19  0618 10/12/19  0507 10/11/19  1926 10/09/19  0651   POTASSIUM mmol/L 3 8 3 4* 3 6 3 8 4 4   CHLORIDE mmol/L 103 103 109* 98 97*   CO2 mmol/L 29 30 25 27 30   BUN mg/dL 10 12 19 33* 12   CREATININE mg/dL 0 55* 0 52* 0 51* 0 67 0 51*   CALCIUM mg/dL 9 1 8 6 7 4* 8 7 8 7       BNP: No results for input(s): BNP in the last 72 hours      Magnesium:   Results from last 7 days   Lab Units 10/12/19  0507   MAGNESIUM mg/dL 2 1       Coags:   Results from last 7 days   Lab Units 10/11/19  1926   PTT seconds 28   INR  1 34       TSH:        Hemoglobin A1C       Lipid Profile:       Meds/Allergies   all current active meds have been reviewed and current meds:   Current Facility-Administered Medications   Medication Dose Route Frequency    acetaminophen (TYLENOL) tablet 650 mg  650 mg Oral Q4H PRN    albuterol inhalation solution 2 5 mg  2 5 mg Nebulization Q4H PRN    ALPRAZolam (XANAX) tablet 0 5 mg  0 5 mg Oral HS PRN    apixaban (ELIQUIS) tablet 5 mg  5 mg Oral BID    dextran 70-hypromellose (GENTEAL TEARS) 0 1-0 3 % ophthalmic solution 1 drop  1 drop Both Eyes Q3H PRN    diltiazem (CARDIZEM) tablet 60 mg  60 mg Oral Q6H [facility]    famotidine (PEPCID) tablet 40 mg  40 mg Oral Daily    ipratropium-albuterol (DUO-NEB) 0 5-2 5 mg/3 mL inhalation solution 3 mL  3 mL Nebulization Q6H While awake    loperamide (IMODIUM) capsule 2 mg  2 mg Oral TID PRN    ondansetron (ZOFRAN) injection 4 mg  4 mg Intravenous Q6H PRN    predniSONE tablet 30 mg  30 mg Oral Daily     Medications Prior to Admission   Medication    apixaban (ELIQUIS) 5 mg    fluticasone-umeclidinium-vilanterol (TRELEGY ELLIPTA) 100-62 5-25 MCG/INH inhaler    furosemide (LASIX) 20 mg tablet    ipratropium (ATROVENT) 0 02 % nebulizer solution    montelukast (SINGULAIR) 10 mg tablet    potassium chloride (MICRO-K) 10 MEQ CR capsule    predniSONE 20 mg tablet    Albuterol Sulfate 108 (90 Base) MCG/ACT AEPB    alendronate (FOSAMAX) 70 mg tablet    ALPRAZolam (XANAX) 0 5 mg tablet    Calcium Carb-Cholecalciferol (CALCIUM 1000 + D PO)    diltiazem (CARDIZEM CD) 180 mg 24 hr capsule    famotidine (PEPCID) 40 MG tablet    ipratropium-albuterol (COMBIVENT RESPIMAT) inhaler            Cardiac testing:   Results for orders placed during the hospital encounter of 10/06/19   Echo complete with contrast if indicated    [place] [id number]    Transthoracic Echocardiogram  2D, M-mode, Doppler, and Color Doppler    Study date:  07-Oct-2019    Patient: Emma Ricardo  MR number: [id number]  Account number: [de-identified]  : 1935  Age: 82 years  Gender: Female  Status: Inpatient  Location: [facility]   Height: 60 in  Weight: 164 lb  BP: 88/ 53 mmHg    Indications: Afib    Diagnoses: I48 0 - Atrial fibrillation    Sonographer:  Jareth Bravo RDCS  Referring Physician:  Ivan Dale MD  Group:  [facility]  Interpreting Physician:  Padmini Jackson MD    SUMMARY    LEFT VENTRICLE:  Systolic function was normal  Ejection fraction was estimated to be 60 %  There were no regional wall motion abnormalities  There was mild concentric hypertrophy  Doppler parameters were consistent with abnormal left ventricular relaxation (grade 1 diastolic dysfunction)      MITRAL VALVE:  There was mild annular calcification  There was trace regurgitation  AORTIC VALVE:  There was mild regurgitation  TRICUSPID VALVE:  There was trace regurgitation  HISTORY: PRIOR HISTORY: Congestive heart failure  Atrial fibrillation  Chronic lung disease  PROCEDURE: The study was performed in the [facility]  This was a routine study  The transthoracic approach was used  The study included complete 2D imaging, M-mode, complete spectral Doppler, and color Doppler  The  heart rate was 92 bpm, at the start of the study  Images were obtained from the parasternal, apical, subcostal, and suprasternal notch acoustic windows  Echocardiographic views were limited due to poor acoustic window availability,  decreased penetration, and lung interference  This was a technically difficult study  LEFT VENTRICLE: Size was normal  Systolic function was normal  Ejection fraction was estimated to be 60 %  There were no regional wall motion abnormalities  Wall thickness was mildly increased  There was mild concentric hypertrophy  DOPPLER: Doppler parameters were consistent with abnormal left ventricular relaxation (grade 1 diastolic dysfunction)  RIGHT VENTRICLE: The size was normal  Systolic function was normal  Wall thickness was normal     LEFT ATRIUM: Size was normal     RIGHT ATRIUM: Size was normal     MITRAL VALVE: There was mild annular calcification  Valve structure was normal  There was normal leaflet separation  DOPPLER: The transmitral velocity was within the normal range  There was no evidence for stenosis  There was trace  regurgitation  AORTIC VALVE: The valve was not well visualized  DOPPLER: Transaortic velocity was within the normal range  There was no evidence for stenosis  There was mild regurgitation  TRICUSPID VALVE: The valve structure was normal  There was normal leaflet separation  DOPPLER: The transtricuspid velocity was within the normal range  There was no evidence for stenosis   There was trace regurgitation  Pulmonary artery  systolic pressure was moderately increased  Estimated peak PA pressure was 65 mmHg  The findings suggest moderate pulmonary hypertension  PULMONIC VALVE: Not well visualized  DOPPLER: The transpulmonic velocity was within the normal range  There was no significant regurgitation  PERICARDIUM: There was no pericardial effusion  The pericardium was normal in appearance  AORTA: The root exhibited normal size  SYSTEMIC VEINS: IVC: The inferior vena cava was upper normal     SYSTEM MEASUREMENT TABLES    2D  %FS: 30 38 %  AV Diam: 3 11 cm  EDV(Teich): 61 91 ml  EF(Teich): 58 53 %  ESV(Teich): 25 68 ml  IVSd: 1 15 cm  LA Area: 16 7 cm2  LA Diam: 3 58 cm  LVEDV MOD A4C: 81 9 ml  LVEF MOD A4C: 56 03 %  LVESV MOD A4C: 36 01 ml  LVIDd: 3 8 cm  LVIDs: 2 65 cm  LVLd A4C: 6 83 cm  LVLs A4C: 5 86 cm  LVPWd: 1 12 cm  RA Area: 14 56 cm2  RV Diam : 3 27 cm  SV MOD A4C: 45 89 ml  SV(Cube): 36 32 ml  SV(Teich): 36 24 ml    CW  AR Dec Jerauld: 1 94 m/s2  AR Dec Time: 1840 69 ms  AR PHT: 533 8 ms  AR Vmax: 3 58 m/s  AR maxP 22 mmHg  TR Vmax: 3 49 m/s  TR maxP 16 mmHg    MM  TAPSE: 2 16 cm    PW  E': 0 07 m/s  E/E': 13 57  MV A Rodrigue: 1 44 m/s  MV Dec Jerauld: 2 9 m/s2  MV DecT: 337 71 ms  MV E Rodrigue: 0 98 m/s  MV E/A Ratio: 0 68    [employer] Accredited Echocardiography Laboratory    Prepared and electronically signed by    Bubba Harper MD  Signed 07-Oct-2019 21:51:59       No results found for this or any previous visit  No results found for this or any previous visit  No results found for this or any previous visit  Counseling / Coordination of Care  Total floor / unit time spent today 25 minutes  Barry [place]

## 2019-10-14 NOTE — ASSESSMENT & PLAN NOTE
· Multifactorial with multiple recent hospitalizations, diarrhea, atrial fibrillation, chronic back pain  · Patient is now agreeable to short-term rehab stay  · Medically stable for discharge  · Case management is working on short-term rehab as recommended by PT and OT

## 2019-10-14 NOTE — SOCIAL WORK
Visit with patient in her hospital room  Patient awaiting ARU acceptance or SNF rehab acceptance  IMM discussed and signed by patient

## 2019-10-14 NOTE — PLAN OF CARE
Problem: Potential for Falls  Goal: Patient will remain free of falls  Description  INTERVENTIONS:  - Assess patient frequently for physical needs  -  Identify cognitive and physical deficits and behaviors that affect risk of falls    -  [place] fall precautions as indicated by assessment   - Educate patient/family on patient safety including physical limitations  - Instruct patient to call for assistance with activity based on assessment  - Modify environment to reduce risk of injury  - Consider OT/PT consult to assist with strengthening/mobility  Outcome: Progressing     Problem: Prexisting or High Potential for Compromised Skin Integrity  Goal: Skin integrity is maintained or improved  Description  INTERVENTIONS:  - Identify patients at risk for skin breakdown  - Assess and monitor skin integrity  - Assess and monitor nutrition and hydration status  - Monitor labs   - Assess for incontinence   - Turn and reposition patient  - Assist with mobility/ambulation  - Relieve pressure over bony prominences  - Avoid friction and shearing  - Provide appropriate hygiene as needed including keeping skin clean and dry  - Evaluate need for skin moisturizer/barrier cream  - Collaborate with interdisciplinary team   - Patient/family teaching  - Consider wound care consult   Outcome: Progressing     Problem: PAIN - ADULT  Goal: Verbalizes/displays adequate comfort level or baseline comfort level  Description  Interventions:  - Encourage patient to monitor pain and request assistance  - Assess pain using appropriate pain scale  - Administer analgesics based on type and severity of pain and evaluate response  - Implement non-pharmacological measures as appropriate and evaluate response  - Consider cultural and social influences on pain and pain management  - Notify physician/advanced practitioner if interventions unsuccessful or patient reports new pain  Outcome: Progressing     Problem: INFECTION - ADULT  Goal: Absence or prevention of progression during hospitalization  Description  INTERVENTIONS:  - Assess and monitor for signs and symptoms of infection  - Monitor lab/diagnostic results  - Monitor all insertion sites, i e  indwelling lines, tubes, and drains  - Monitor endotracheal if appropriate and nasal secretions for changes in amount and color  - [place] appropriate cooling/warming therapies per order  - Administer medications as ordered  - Instruct and encourage patient and family to use good hand hygiene technique  - Identify and instruct in appropriate isolation precautions for identified infection/condition  Outcome: Progressing  Goal: Absence of fever/infection during neutropenic period  Description  INTERVENTIONS:  - Monitor WBC    Outcome: Progressing     Problem: SAFETY ADULT  Goal: Maintain or return to baseline ADL function  Description  INTERVENTIONS:  -  Assess patient's ability to carry out ADLs; assess patient's baseline for ADL function and identify physical deficits which impact ability to perform ADLs (bathing, care of mouth/teeth, toileting, grooming, dressing, etc )  - Assess/evaluate cause of self-care deficits   - Assess range of motion  - Assess patient's mobility; develop plan if impaired  - Assess patient's need for assistive devices and provide as appropriate  - Encourage maximum independence but intervene and supervise when necessary  - Involve family in performance of ADLs  - Assess for home care needs following discharge   - Consider OT consult to assist with ADL evaluation and planning for discharge  - Provide patient education as appropriate  Outcome: Progressing  Goal: Maintain or return mobility status to optimal level  Description  INTERVENTIONS:  - Assess patient's baseline mobility status (ambulation, transfers, stairs, etc )    - Identify cognitive and physical deficits and behaviors that affect mobility  - Identify mobility aids required to assist with transfers and/or ambulation (gait belt, sit-to-stand, lift, walker, cane, etc )  - [place] fall precautions as indicated by assessment  - Record patient progress and toleration of activity level on Mobility SBAR; progress patient to next Phase/Stage  - Instruct patient to call for assistance with activity based on assessment  - Consider rehabilitation consult to assist with strengthening/weightbearing, etc   Outcome: Progressing     Problem: DISCHARGE PLANNING  Goal: Discharge to home or other facility with appropriate resources  Description  INTERVENTIONS:  - Identify barriers to discharge w/patient and caregiver  - Arrange for needed discharge resources and transportation as appropriate  - Identify discharge learning needs (meds, wound care, etc )  - Arrange for interpretive services to assist at discharge as needed  - Refer to Case Management Department for coordinating discharge planning if the patient needs post-hospital services based on physician/advanced practitioner order or complex needs related to functional status, cognitive ability, or social support system  Outcome: Progressing     Problem: Knowledge Deficit  Goal: Patient/family/caregiver demonstrates understanding of disease process, treatment plan, medications, and discharge instructions  Description  Complete learning assessment and assess knowledge base    Interventions:  - Provide teaching at level of understanding  - Provide teaching via preferred learning methods  Outcome: Progressing     Problem: DISCHARGE PLANNING - CARE MANAGEMENT  Goal: Discharge to post-acute care or home with appropriate resources  Description  INTERVENTIONS:  - Conduct assessment to determine patient/family and health care team treatment goals, and need for post-acute services based on payer coverage, community resources, and patient preferences, and barriers to discharge  - Address psychosocial, clinical, and financial barriers to discharge as identified in assessment in conjunction with the patient/family and health care team  - Arrange appropriate level of post-acute services according to patient's   needs and preference and payer coverage in collaboration with the physician and health care team  - Communicate with and update the patient/family, physician, and health care team regarding progress on the discharge plan  - Arrange appropriate transportation to post-acute venues    Pt's goal is to return home after some rehab   Outcome: Progressing

## 2019-10-14 NOTE — PROGRESS NOTES
Progress Note - Donis Bakari 1935, 82 y o  female MRN: [id number]    Unit/Bed#: -02 Encounter: [id number]    Primary Care Provider: Tex Sanchez MD   Date and time admitted to hospital: 10/11/2019  6:48 PM        * Atrial fibrillation with rapid ventricular response ([facility] )  Assessment & Plan  · More or less rate controlled  · Status post a Cardizem drip - continue diltiazem 60 mg p o  Q 6 hours  · Currently on Eliquis for anticoagulation purposes  · Cardiology input appreciated  · Patient is stable from a cardiac standpoint for discharge    COPD (chronic obstructive pulmonary disease) ([facility] )  Assessment & Plan  · Continue steroid taper  · Continue duo nebs  · Continue oxygen nasal cannula    Chronic anxiety  Assessment & Plan  · Continue Xanax at night    Chronic low back pain  Assessment & Plan  · Status post PT and OT evaluation  · Needs a short-term rehab  · Case management is working on final placement    Chronic respiratory failure with hypoxia ([facility] )  Assessment & Plan  · Patient wears 2 L oxygen at home  · Stable and at baseline    [facility]  · Multifactorial with multiple recent hospitalizations, diarrhea, atrial fibrillation, chronic back pain  · Patient is now agreeable to short-term rehab stay  · Medically stable for discharge  · Case management is working on short-term rehab as recommended by PT and OT    Diarrhea  Assessment & Plan  · Patient with diarrhea recent colitis on CT  · Stool cultures are negative  · Stool for C diff is negative  · Patient is afebrile, no leukocytosis and procalcitonin testing is within normal limits  · Okay for Imodium use    Obesity  Assessment & Plan  · BMI is 34  · Weight loss counseling provided    Positive culture findings in sputum  Assessment & Plan  · Patient with continued cough and chronic respiratory failure requiring home oxygen  · Patient has had multiple hospitalizations over the last 3 weeks  · Sputum culture done on last admission showed Haemophilus influenza, MRSA, Klebsiella pneumoniae  · Discussed case with ID, given that patient had normal procalcitonin and is currently afebrile no need for treatment at this time  If patient does develop fever/leukocytosis/worsening symptoms will repeat imaging and treat accordingly        VTE Pharmacologic Prophylaxis: Pharmacologic: Apixaban (Eliquis)    Patient Centered Rounds: I have performed bedside rounds with nursing staff today  Discussions with Specialists or Other Care Team Provider:  Cardiology, case management, nursing and pharmacy  Education and Discussions with Family / Patient:  Patient was brought up to par with the plan of care for today, all questions answered to her satisfaction    Current Length of Stay: 3 day(s)    Current Patient Status: Inpatient   Certification Statement: The patient will continue to require additional inpatient hospital stay due to Waiting for case management to finalize the discharge planning    Discharge Plan:  Patient remains medically stable for discharge, awaiting case management final recommendations on a safe disposition    Code Status: Level 1 - Full Code    Subjective:   Patient seen and examined  Patient complains about bruises on her arms  Patient unhappy about going to her short-term rehab but understands why she needs to  Would prefer to stay local    Objective:     Vitals:   Temp (24hrs), Av 7 °F (36 5 °C), Min:97 1 °F (36 2 °C), Max:98 7 °F (37 1 °C)    Temp:  [97 1 °F (36 2 °C)-98 7 °F (37 1 °C)] 98 7 °F (37 1 °C)  HR:  [] 88  Resp:  [18-22] 20  BP: ()/(48-84) 134/84  SpO2:  [92 %-99 %] 98 %  Body mass index is 71 04 kg/m²  Input and Output Summary (last 24 hours):        Intake/Output Summary (Last 24 hours) at 10/14/2019 1038  Last data filed at 10/13/2019 1700  Gross per 24 hour   Intake 720 ml   Output 600 ml   Net 120 ml       Physical Exam:     Physical Exam   Constitutional: She is oriented to person, place, and time  She appears well-developed and well-nourished  HENT:   Head: Normocephalic and atraumatic  Nose: Nose normal    Mouth/Throat: Oropharynx is clear and moist    Eyes: Pupils are equal, round, and reactive to light  Conjunctivae and EOM are normal    Neck: Normal range of motion  Neck supple  No JVD present  No thyromegaly present  Cardiovascular: Normal rate, regular rhythm and intact distal pulses  Exam reveals no gallop and no friction rub  No murmur heard  Pulmonary/Chest: Effort normal and breath sounds normal  No respiratory distress  Abdominal: Soft  Bowel sounds are normal  She exhibits no distension and no mass  There is no tenderness  There is no guarding  Musculoskeletal: Normal range of motion  She exhibits no edema  Lymphadenopathy:     She has no cervical adenopathy  Neurological: She is alert and oriented to person, place, and time  No cranial nerve deficit  Skin: Skin is warm  No rash noted  No erythema  Psychiatric: She has a normal mood and affect  Her behavior is normal    Vitals reviewed  Additional Data:     Labs:    Results from last 7 days   Lab Units 10/14/19  0529   WBC Thousand/uL 11 40*   HEMOGLOBIN g/dL 9 8*   HEMATOCRIT % 29 9*   PLATELETS Thousands/uL 254   NEUTROS PCT % 77*   LYMPHS PCT % 16*   MONOS PCT % 6   EOS PCT % 0     Results from last 7 days   Lab Units 10/14/19  0529  10/11/19  1926   POTASSIUM mmol/L 3 8   < > 3 8   CHLORIDE mmol/L 103   < > 98   CO2 mmol/L 29   < > 27   BUN mg/dL 10   < > 33*   CREATININE mg/dL 0 55*   < > 0 67   CALCIUM mg/dL 9 1   < > 8 7   ALK PHOS U/L  --   --  71   ALT U/L  --   --  32   AST U/L  --   --  14    < > = values in this interval not displayed  Results from last 7 days   Lab Units 10/11/19  1926   INR  1 34               * I Have Reviewed All Lab Data Listed Above  * Additional Pertinent Lab Tests Reviewed:  [facility] Admission  Reviewed    Imaging:  Imaging Reports Reviewed Today Include:  None    Recent Cultures (last 7 days):     Results from last 7 days   Lab Units 10/12/19  1306 10/11/19  1930 10/09/19  0651   BLOOD CULTURE   --  No Growth at 48 hrs  No Growth at 48 hrs  --    SPUTUM CULTURE   --   --  4+ Growth of Haemophilus influenzae*  2+ Growth of Klebsiella pneumoniae*  2+ Growth of Methicillin Resistant Staphylococcus aureus*  2+ Growth of    GRAM STAIN RESULT   --   --  4+ Polys*  3+ Gram negative coccobacilli*  Rare Gram positive cocci in pairs*  Rare Gram positive rods*   C DIFF TOXIN B  NEGATIVE for C difficle toxin by PCR    --   --        Last 24 Hours Medication List:     Current Facility-Administered Medications:  acetaminophen 650 mg Oral Q4H PRN Tristen Todd PA-C   albuterol 2 5 mg Nebulization Q4H PRN William Staff, MD   ALPRAZolam 0 5 mg Oral HS PRN Tristen Todd PA-C   apixaban 5 mg Oral BID Tristen Todd PA-C   dextran 70-hypromellose 1 drop Both Eyes Q3H PRN Anahi Sprague MD   diltiazem 60 mg Oral Q6H [address] Christina Lee PA-C   famotidine 40 mg Oral Daily Tristen Todd PA-C   ipratropium-albuterol 3 mL Nebulization Q6H While awake William Forte MD   loperamide 2 mg Oral TID PRN Leyla Stern MD   ondansetron 4 mg Intravenous Q6H PRN Tristen Todd PA-C   predniSONE 30 mg Oral Daily Anahi Sprague MD        Today, Patient Was Seen By: Leyla Stern MD    ** Please Note: Dictation voice to text software may have been used in the creation of this document   **

## 2019-10-14 NOTE — PHYSICAL THERAPY NOTE
Physical Therapy Evaluation     Patient's Name: Donis Villegas    Admitting Diagnosis  Diarrhea [R19 7]  Dehydration [E86 0]  Atrial fibrillation with rapid ventricular response ([address] ) [I48 91]  Protracted diarrhea [R19 7]    Problem List  Patient Active Problem List   Diagnosis    COPD (chronic obstructive pulmonary disease) ([address] )    Cardiac disease    Diverticulosis of intestine with bleeding    Diarrhea    Colorectal polyps    Effusion of bursa of left elbow    Cardiomegaly    Chronic anxiety    Chronic cystitis    Chronic low back pain    Congestive heart failure ([address] )    Deviated nasal septum    Diverticulosis of colon    Gastroesophageal reflux disease without esophagitis    Gout    History of angioedema    History of colonic polyps    Lumbar radiculopathy    Macular degeneration of both eyes    Obesity    Osteoporosis    Other specified forms of chronic ischemic heart disease    Seasonal allergies    Panic attack    Vocal cord dysfunction    Colitis presumed infectious    Atrial fibrillation with rapid ventricular response ([address] )    Left elbow pain    Chronic respiratory failure with hypoxia ([address] )    Debility    Positive culture findings in sputum       Past Medical History  Past Medical History:   Diagnosis Date    Asthma     Atrial fibrillation     Blurred vision     Cardiac disease     Colitis     COPD (chronic obstructive pulmonary disease)     Diverticulosis     Emphysema lung        Past Surgical History  Past Surgical History:   Procedure Laterality Date    BLADDER SURGERY      COLON SURGERY      COLONOSCOPY W/ POLYPECTOMY N/A 1/21/2019    Procedure: COLONOSCOPY W/ POLYPECTOMY;  Surgeon: Donovan Johns MD;  Location: [facility];   Service: General    SMALL INTESTINE SURGERY          10/14/19 1000   Note Type   Note type Eval only   Pain Assessment   Pain Assessment No/denies pain   Pain Score No Pain   Home Living   Type of [address] [place] [address] level;Performs ADLs on one level;Stairs to enter with rails; Able to live on main level with bedroom/bathroom   Bathroom Shower/Tub Tub/shower unit   Bathroom Toilet Standard   Bathroom Equipment Grab bars in shower;Grab bars around toilet   Bathroom Accessibility Accessible   Prior Function   Level of [place] Independent with ADLs and functional mobility; Needs assistance with IADLs   Lives With Alone   Receives Help From Friend(s); Neighbor   ADL Assistance Independent   IADLs Needs assistance  (transportation)   Falls in the last 6 months 0   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)   Restrictions/Precautions   Wells April Bearing Precautions Per Order No   Other Precautions Contact/isolation;O2;Fall Risk;Pain   Cognition   Overall Cognitive Status WFL   Attention Within functional limits   Orientation Level Oriented X4   Memory Decreased short term memory   Following Commands Follows one step commands with increased time or repetition   Comments   (Pt  ageeable w/ therapy session )   RUE Assessment   RUE Assessment WFL   LUE Assessment   LUE Assessment WFL   RLE Assessment   RLE Assessment X   Strength RLE   R Hip Flexion 4/5   R Knee Extension 4/5   LLE Assessment   LLE Assessment X   Strength LLE   L Hip Flexion 3+/5   L Knee Extension 3+/5   Coordination   Movements are Fluid and Coordinated 1   Sensation WFL   Light Touch   RLE Light Touch Grossly intact   LLE Light Touch Grossly intact   Sharp/Dull   RLE Sharp/Dull Grossly intact   LLE Sharp/Dull Grossly intact   Proprioception   RLE Proprioception Grossly intact   LLE Proprioception Grossly Intact   Transfers   Sit to Stand 4  Minimal assistance   Additional items Assist x 1;Verbal cues; Increased time required;Armrests   Stand to Sit 4  Minimal assistance   Additional items Assist x 1; Armrests; Increased time required;Verbal cues   Stand pivot 4  Minimal assistance   Additional items Assist x 1; Increased time required;Armrests; Verbal cues   Toilet transfer 4  Minimal assistance   Additional items Assist x 1; Armrests;Commode;Verbal cues; Increased time required   Ambulation/Elevation   Gait pattern Forward Flexion; Short stride   Gait Assistance 5  Supervision   Additional items Verbal cues   Assistive Device Rolling walker   Distance 50 feet   Balance   Static Sitting Good   Dynamic Sitting Fair +   Static Standing Fair   Activity Tolerance   Activity Tolerance Patient limited by fatigue;Patient limited by pain   Assessment   Prognosis Fair   Problem List Decreased strength;Decreased endurance; Impaired balance;Decreased mobility;Pain   Assessment Pt is 82 y o  female seen for PT evaluation on 10/14/2019 s/p admit to [address] [place] [address] on 10/11/2019 w/ Atrial fibrillation with rapid ventricular response ([facility] )  PT consulted to assess pt's functional mobility and d/c needs  Order placed for PT eval and tx, w/ up and OOB as tolerated order  Performed at least 2 patient identifiers during session: Name and wristband  Comorbidities affecting pt's physical performance at time of assessment include: Afib, COPD, Anxiety, chronic low back pain, active MRSA   PTA, pt was lives alone in 1 level home  Personal factors affecting pt at time of IE include: inability to navigate community distances, unable to perform dynamic tasks in community, limited home support, anxiety and unable to perform physical activity  Please find objective findings from PT assessment regarding body systems outlined above with impairments and limitations including weakness, impaired balance, decreased endurance, gait deviations, pain, decreased activity tolerance, decreased functional mobility tolerance and fall risk, as well as mobility assessment (need for minimal  assist w/ all phases of mobility when usually ambulating independently)  The following objective measures performed on IE also reveal limitations: Barthel Index: 40/100   Pt's clinical presentation is currently unstable/unpredictable seen in pt's presentation of need for minimum x1  assist w/ all phases of mobility when usually mobilizing independently  Pt to benefit from continued PT tx to address deficits as defined above and maximize level of functional independent mobility and consistency  From PT/mobility standpoint, recommendation at time of d/c would be STR pending progress in order to facilitate return to PLOF  Goals   Patient Goals Go to physical therapy on her own  STG Expiration Date 10/24/19   Short Term Goal #1 In 7-10 days: Increase bilateral LE strength 1/2 grade to facilitate independent mobility, Perform all transfers modified independent to improve independence, Ambulate > 150-200 ft  with RW modified independent w/o LOB and w/ normalized gait pattern 100% of the time, Navigate 3 stairs modified independent with unilateral handrail to facilitate return to previous living environment, Increase all balance 1/2 grade to decrease risk for falls and Complete % of the time  PT Treatment Day 0   Plan   Treatment/Interventions Functional transfer training;LE strengthening/ROM; Therapeutic exercise; Endurance training;Equipment eval/education;Gait training; Compensatory technique education;Spoke to nursing;Elevations; Spoke to case management   PT Frequency   (3-5 x/wk)   Recommendation   Recommendation Short-term skilled PT   Equipment Recommended Walker   PT - OK to Discharge Yes  (when medically stable)   Additional Comments pt seat OOB in chair with all needs within reach    Barthel Index   Feeding 10   Bathing 0   Grooming Score 5   Dressing Score 5   Bladder Score 5   Bowels Score 5   Toilet Use Score 5   Transfers (Bed/Chair) Score 5   Mobility (Level Surface) Score 0   Stairs Score 0   Barthel Index Score 40           Gisselle Camara

## 2019-10-14 NOTE — ASSESSMENT & PLAN NOTE
· More or less rate controlled  · Status post a Cardizem drip - continue diltiazem 60 mg p o  Q 6 hours  · Currently on Eliquis for anticoagulation purposes  · Cardiology input appreciated  · Patient is stable from a cardiac standpoint for discharge

## 2019-10-14 NOTE — UTILIZATION REVIEW
Initial Clinical Review    Admission: Date/Time/Statement: Inpatient Admission Orders (From admission, onward)     Ordered        10/11/19 2106  Inpatient Admission (expected length of stay for this patient Order details is greater than two midnights)  Once                   Orders Placed This Encounter   Procedures    Inpatient Admission (expected length of stay for this patient Order details is greater than two midnights)     Standing Status:   Standing     Number of Occurrences:   1     Order Specific Question:   Admitting Physician     Answer:   Mable Morgan [[id number]]     Order Specific Question:   Level of Care     Answer:   Level 2 Stepdown / HOT [14]     Order Specific Question:   Bed request comments     Answer:   monitor     Order Specific Question:   Estimated length of stay     Answer:   More than 2 Midnights     Order Specific Question:   Certification     Answer:   I certify that inpatient services are medically necessary for this patient for a duration of greater than two midnights  See H&P and MD Progress Notes for additional information about the patient's course of treatment  ED Arrival Information     Expected Arrival Acuity Means of Arrival Escorted By Service Admission Type    - 10/11/2019 18:45 Urgent Walk-In Other (Comment) Hospitalist Urgent    Arrival Complaint    Diarrhea        Chief Complaint   Patient presents with    Diarrhea    Weakness - Generalized    Back Pain     Assessment/Plan:    82 y o  female who presents with diarrhea  Patient with past medical history of COPD with emphysematous changes on CT with chronic oxygen of 5 L, recent colitis on CT, recent admission for elbow pain, atrial fibrillation with recent admission for RVR, returns to the emergency room secondary to report of diarrhea, weakness and back pain  Patient has been seen in the emergency room 4 times in the last 10 days and admitted now for times    On discharge patient was recommended short-term rehab but she refused  Patient was discharged yesterday and had breakfast   Around 4pm yesterday she started with diarrhea  She reports not eating or sleeping most of night secondary to diarrhea  30-50 times with lots of gas, foul smelling, went through over a dozen chucks, + incontinent  Home health nurse and therapy services were there and she was too weak to participate  In the emergency room she was found have AFib with RVR was given Cardizem 15 mg x1 and placed on a Cardizem drip, she also received 2 L of IV fluids  In discussion with the patient she is now agreeable to short-term rehab        Chronic atrial fibrillation  Assessment & Plan  · Patient with atrial fib with RVR  · Placed on a Cardizem drip in the emergency room  · Start Cardizem 60mg po every 6 h  · She will be placed as step-down level 2  · Consult Cardiology     Diarrhea  Assessment & Plan  · Patient with diarrhea recent colitis on CT  · Check stool studies, rule out C diff  · Start vanco 125mg zeferino 6 hours until c diff results return     Debility  Assessment & Plan  · Multifactorial with multiple recent hospitalizations, diarrhea, atrial fibrillation, chronic back pain  · Patient is now agreeable to short-term rehab stay  · Case management for discharge planning  · PT OT for discharge recommendation     Chronic respiratory failure with hypoxia [facility])  Assessment & Plan  · Patient wears 2 L oxygen at home       ED Triage Vitals [10/11/19 1847]   Temperature Pulse Respirations Blood Pressure SpO2   98 2 °F (36 8 °C) 76 (!) 24 152/69 100 %      Temp Source Heart Rate Source Patient Position - Orthostatic VS BP Location FiO2 (%)   Temporal Monitor Lying Right arm --      Pain Score       Worst Possible Pain        Wt Readings from Last 1 Encounters:   10/14/19 (!) 165 kg (363 lb 12 1 oz)     Additional Vital Signs:  10/12/19 2218  97 8 °F (36 6 °C)  90  18  102/64  98 %  Nasal cannula     10/12/19 1950          97 %  Nasal cannula     10/12/19 1854 97 2 °F (36 2 °C)Abnormal   96  17  98/54  98 %  Nasal cannula     10/12/19 1515  97 7 °F (36 5 °C)  85  18  108/60  98 %  Nasal cannula     10/12/19 1206  97 °F (36 1 °C)Abnormal   85  20  111/59  97 %  Nasal cannula  Sitting   10/12/19 0814        112/76    Nasal cannula  Sitting   10/12/19 0749          95 %       10/12/19 0728  97 8 °F (36 6 °C)  92  22  96/51  95 %  Nasal cannula  Lying   10/12/19 0559        112/62         10/12/19 0522          98 %  Nasal cannula     10/12/19 0259  97 3 °F (36 3 °C)Abnormal   67  24Abnormal   129/58  96 %  None (Room air)  Lying   10/12/19 0215    86    110/64      Lying   10/12/19 0200    90    116/50      Lying   10/12/19 0145    90    116/66      Lying   10/12/19 0130    90    111/56      Lying   10/12/19 0115    99    111/55      Lying   10/12/19 0100    95    87/59Abnormal       Lying   10/12/19 0045    90    99/52      Lying   10/12/19 0030    81    109/54  97 %  Nasal cannula  Lying   10/12/19 0015    95    104/57      Lying   10/12/19 0000  97 9 °F (36 6 °C)  89  24Abnormal   115/67      Lying         Pertinent Labs/Diagnostic Test Results:   Collection Time Result Time Vent R Atrial R WI Int  QRSD Int  QT Int  QTC Int   P Axis QRS Axis T Wave Ax    10/11/19 18:55:30 10/14/19 12:37:19 148 138  40 252 395  6 267       Baseline artifact  Probable atrial fibrillation with occasional PVCs or aberrant conduction  Low voltage QRS  Abnormal ECG      Results from last 7 days   Lab Units 10/14/19  0529 10/13/19  0618 10/11/19  1926 10/09/19  0651   WBC Thousand/uL 11 40* 9 70 12 60* 11 20*   HEMOGLOBIN g/dL 9 8* 9 5* 11 0* 12 7   HEMATOCRIT % 29 9* 28 9* 33 6* 38 0*   PLATELETS Thousands/uL 254 219 272 198   NEUTROS ABS Thousands/µL 8 80*  --  9 90* 9 60*   TOTAL NEUT ABS Thousand/uL  --  6 89*  --   --    BANDS PCT %  --  6  --   --          Results from last 7 days   Lab Units 10/14/19  0529 10/13/19  6172 10/12/19  0507 10/11/19  1926 10/09/19  0651   SODIUM mmol/L 140 140 139 134 132*   POTASSIUM mmol/L 3 8 3 4* 3 6 3 8 4 4   CHLORIDE mmol/L 103 103 109* 98 97*   CO2 mmol/L 29 30 25 27 30   ANION GAP mmol/L 8 7 5 9 5   BUN mg/dL 10 12 19 33* 12   CREATININE mg/dL 0 55* 0 52* 0 51* 0 67 0 51*   EGFR ml/min/1 73sq m 88 89 90 82 90   CALCIUM mg/dL 9 1 8 6 7 4* 8 7 8 7   MAGNESIUM mg/dL  --   --  2 1  --   --    PHOSPHORUS mg/dL  --   --  2 1*  --   --      Results from last 7 days   Lab Units 10/11/19  1926 10/09/19  0651   AST U/L 14 15   ALT U/L 32 25   ALK PHOS U/L 71 72   TOTAL PROTEIN g/dL 6 4 6 3*   ALBUMIN g/dL 3 6 3 3*   TOTAL BILIRUBIN mg/dL 0 40 0 40         Results from last 7 days   Lab Units 10/14/19  0529 10/13/19  0618 10/12/19  0507 10/11/19  1926 10/09/19  0651   GLUCOSE RANDOM mg/dL 124* 107* 128* 200* 154*           Results from last 7 days   Lab Units 10/11/19  1926   PROTIME seconds 15 6*   INR  1 34   PTT seconds 28         Results from last 7 days   Lab Units 10/14/19  0529 10/13/19  0618 10/10/19  0454 10/09/19  1456   PROCALCITONIN ng/ml <0 05 0 05 0 09 0 07     Results from last 7 days   Lab Units 10/11/19  2206 10/11/19  1954   LACTIC ACID mmol/L 1 5 2 0             Results from last 7 days   Lab Units 10/09/19  0651   BNP pg/mL 71       Results from last 7 days   Lab Units 10/11/19  2024 10/09/19  1511   CLARITY UA  Clear Slightly Cloudy*   COLOR UA  Yellow Yellow   SPEC GRAV UA  <=1 005* <=1 005*   PH UA  6 5 6 0   GLUCOSE UA mg/dl Negative 3+*   KETONES UA mg/dl Negative 40 (2+)*   BLOOD UA  2+* Trace-Intact*   PROTEIN UA mg/dl Negative Negative   NITRITE UA  Negative Negative   BILIRUBIN UA  Negative Negative   UROBILINOGEN UA E U /dl 0 2 0 2   LEUKOCYTES UA  1+* Negative   WBC UA /hpf 4-10* 0-1*   RBC UA /hpf 1-2* 1-2*   BACTERIA UA /hpf Occasional None Seen   EPITHELIAL CELLS WET PREP /hpf Innumerable* Occasional                 Results from last 7 days   Lab Units 10/12/19  1306   C DIFF TOXIN B  NEGATIVE for C difficle toxin by PCR  Results from last 7 days   Lab Units 10/12/19  1306   SALMONELLA SP PCR  None Detected   SHIGELLA SP/ENTEROINVASIVE E  COLI (EIEC)  None Detected   CAMPYLOBACTER SP (JEJUNI AND COLI)  None Detected   SHIGA TOXIN 1/SHIGA TOXIN 2  None Detected         Results from last 7 days   Lab Units 10/11/19  1930 10/09/19  0651   BLOOD CULTURE  No Growth at 48 hrs  No Growth at 48 hrs    --    SPUTUM CULTURE   --  4+ Growth of Haemophilus influenzae*  2+ Growth of Klebsiella pneumoniae*  2+ Growth of Methicillin Resistant Staphylococcus aureus*  2+ Growth of    GRAM STAIN RESULT   --  4+ Polys*  3+ Gram negative coccobacilli*  Rare Gram positive cocci in pairs*  Rare Gram positive rods*     Results from last 7 days   Lab Units 10/13/19  0618   TOTAL COUNTED  100           ED Treatment:   Medication Administration from 10/11/2019 1845 to 10/11/2019 2255       Date/Time Order Dose Route     10/11/2019 1944 sodium chloride 0 9 % bolus 1,000 mL 1,000 mL Intravenous     10/11/2019 1942 diltiazem (CARDIZEM) injection 15 mg 15 mg Intravenous     10/11/2019 2011 diltiazem (CARDIZEM) 125 mg in sodium chloride 0 9 % 125 mL infusion 5 mg/hr Intravenous     10/11/2019 2106 sodium chloride 0 9 % bolus 1,500 mL 1,500 mL Intravenous        Past Medical History:   Diagnosis    Asthma    Atrial fibrillation    Blurred vision    Cardiac disease    Colitis    COPD (chronic obstructive pulmonary disease)    Diverticulosis    Emphysema lung     Present on Admission:   Diarrhea   Chronic respiratory failure with hypoxia (HCC)   Chronic low back pain   Chronic anxiety   Atrial fibrillation with rapid ventricular response (HCC)   Obesity   Debility      Admitting Diagnosis:     Diarrhea [R19 7]  Dehydration [E86 0]  Atrial fibrillation with rapid ventricular response (HCC) [I48 91]  Protracted diarrhea [R19 7]    Age/Sex: 82 y o  female     Admission Orders:    acetaminophen 650 mg Oral Q4H PRN   albuterol 2 5 mg Nebulization Q4H PRN   ALPRAZolam 0 5 mg Oral HS PRN   apixaban 5 mg Oral BID   diltiazem 1-15 mg/hr Intravenous Once   diltiazem 60 mg Oral Q6H [facility]   famotidine 40 mg Oral Daily   furosemide 20 mg Oral Daily   ipratropium-albuterol 3 mL Nebulization Q6H While awake   ondansetron 4 mg Intravenous Q6H PRN   [START ON 10/13/2019] predniSONE 30 mg Oral Daily   vancomycin 125 mg Oral Q6H [facility]             Medications:  apixaban 5 mg Oral BID   diltiazem 240 mg Oral Daily   famotidine 40 mg Oral Daily   ipratropium-albuterol 3 mL Nebulization Q6H While awake   metoprolol tartrate 25 mg Oral Q12H [facility]   predniSONE 30 mg Oral Daily          acetaminophen 650 mg Oral Q4H PRN   albuterol 2 5 mg Nebulization Q4H PRN   ALPRAZolam 0 5 mg Oral HS PRN   dextran 70-hypromellose 1 drop Both Eyes Q3H PRN   loperamide 2 mg Oral TID PRN   ondansetron 4 mg Intravenous Q6H PRN       IP CONSULT TO CARDIOLOGY  IP CONSULT TO CASE MANAGEMENT  IP CONSULT TO CASE MANAGEMENT  IP CONSULT TO PHARMACY    Network Utilization Review Department  Phone: [phone]; Fax [phone]  [email or web address]  ATTENTION: Please call with any questions or concerns to [phone]  and carefully listen to the prompts so that you are directed to the right person  Send all requests for admission clinical reviews, approved or denied determinations and any other requests to fax [phone]   All voicemails are confidential

## 2019-10-15 ENCOUNTER — HOSPITAL ENCOUNTER (INPATIENT)
Facility: HOSPITAL | Age: 84
LOS: 10 days | Discharge: HOME | DRG: 191 | End: 2019-10-25
Attending: FAMILY MEDICINE | Admitting: FAMILY MEDICINE
Payer: MEDICARE

## 2019-10-15 VITALS
DIASTOLIC BLOOD PRESSURE: 60 MMHG | WEIGHT: 293 LBS | RESPIRATION RATE: 20 BRPM | HEIGHT: 60 IN | TEMPERATURE: 97 F | HEART RATE: 72 BPM | OXYGEN SATURATION: 95 % | SYSTOLIC BLOOD PRESSURE: 110 MMHG | BODY MASS INDEX: 57.52 KG/M2

## 2019-10-15 DIAGNOSIS — I50.9 CONGESTIVE HEART FAILURE, UNSPECIFIED HF CHRONICITY, UNSPECIFIED HEART FAILURE TYPE (HCC): ICD-10-CM

## 2019-10-15 DIAGNOSIS — I48.91 ATRIAL FIBRILLATION WITH RAPID VENTRICULAR RESPONSE (HCC): ICD-10-CM

## 2019-10-15 DIAGNOSIS — R19.7 DIARRHEA, UNSPECIFIED TYPE: Primary | ICD-10-CM

## 2019-10-15 PROCEDURE — 97116 GAIT TRAINING THERAPY: CPT

## 2019-10-15 PROCEDURE — 97530 THERAPEUTIC ACTIVITIES: CPT

## 2019-10-15 PROCEDURE — 94760 N-INVAS EAR/PLS OXIMETRY 1: CPT

## 2019-10-15 PROCEDURE — 94640 AIRWAY INHALATION TREATMENT: CPT

## 2019-10-15 PROCEDURE — 99239 HOSP IP/OBS DSCHRG MGMT >30: CPT | Performed by: HOSPITALIST

## 2019-10-15 PROCEDURE — 97110 THERAPEUTIC EXERCISES: CPT

## 2019-10-15 RX ORDER — DILTIAZEM HYDROCHLORIDE 240 MG/1
240 CAPSULE, COATED, EXTENDED RELEASE ORAL DAILY
Refills: 0 | Status: ON HOLD
Start: 2019-10-16 | End: 2019-11-04 | Stop reason: SDUPTHER

## 2019-10-15 RX ORDER — PREDNISONE 10 MG/1
30 TABLET ORAL DAILY
Refills: 0 | Status: ON HOLD
Start: 2019-10-16 | End: 2019-11-01

## 2019-10-15 RX ORDER — LOPERAMIDE HYDROCHLORIDE 2 MG/1
2 CAPSULE ORAL 3 TIMES DAILY PRN
Qty: 30 CAPSULE | Refills: 0 | Status: ON HOLD
Start: 2019-10-15 | End: 2019-11-04 | Stop reason: SDUPTHER

## 2019-10-15 RX ADMIN — FAMOTIDINE 40 MG: 20 TABLET ORAL at 08:53

## 2019-10-15 RX ADMIN — APIXABAN 5 MG: 5 TABLET, FILM COATED ORAL at 08:53

## 2019-10-15 RX ADMIN — LOPERAMIDE HYDROCHLORIDE 2 MG: 2 CAPSULE ORAL at 08:53

## 2019-10-15 RX ADMIN — PREDNISONE 30 MG: 10 TABLET ORAL at 08:53

## 2019-10-15 RX ADMIN — DEXTRAN 70 AND HYPROMELLOSE 2910 1 DROP: 1; 3 SOLUTION/ DROPS OPHTHALMIC at 09:11

## 2019-10-15 RX ADMIN — IPRATROPIUM BROMIDE AND ALBUTEROL SULFATE 3 ML: 2.5; .5 SOLUTION RESPIRATORY (INHALATION) at 13:59

## 2019-10-15 RX ADMIN — DILTIAZEM HYDROCHLORIDE 240 MG: 240 CAPSULE, COATED, EXTENDED RELEASE ORAL at 08:53

## 2019-10-15 RX ADMIN — METOPROLOL TARTRATE 25 MG: 25 TABLET, FILM COATED ORAL at 08:53

## 2019-10-15 RX ADMIN — IPRATROPIUM BROMIDE AND ALBUTEROL SULFATE 3 ML: 2.5; .5 SOLUTION RESPIRATORY (INHALATION) at 07:00

## 2019-10-15 NOTE — ASSESSMENT & PLAN NOTE
· Multifactorial with multiple recent hospitalizations, diarrhea, atrial fibrillation, chronic back pain  · Patient is now agreeable to short-term rehab stay  · Medically stable for discharge  · DC to the [facility]

## 2019-10-15 NOTE — SOCIAL WORK
Pt to be d/c to the [place] today as discussed at care coordination, Maikol Ramos was called this morning with the d/c , rn was given the # for report and rm#, I dic call Tiffany Avelar @ 13:45 to # [phone] to discuss d/c plan, pt and Tiffany Avelar are in agreement with the d/c and d/c plan to the [place] today

## 2019-10-15 NOTE — PHYSICAL THERAPY NOTE
10/15/19 0919   Pain Assessment   Pain Assessment 0-10   Pain Score 5   Pain Type Chronic pain   Pain Location Back   Pain Orientation Lower   Pain Radiating Towards   (LLE)   Restrictions/Precautions   Weight Bearing Precautions Per Order No   Other Precautions Contact/isolation   General   Chart Reviewed Yes   Response to Previous Treatment Patient with no complaints from previous session  Family/Caregiver Present No   Cognition   Overall Cognitive Status WFL   Arousal/Participation Alert; Cooperative   Attention Within functional limits   Orientation Level Oriented X4   Memory Decreased short term memory   Following Commands Follows one step commands with increased time or repetition   Bed Mobility   Additional Comments Pt OOB upon arrival   Transfers   Sit to Stand 5  Supervision   Additional items Assist x 1; Armrests; Verbal cues; Impulsive   Stand to Sit 5  Supervision   Additional items Assist x 1; Armrests; Verbal cues; Impulsive   Stand pivot 5  Supervision  (CGA)   Additional items Assist x 1   Toilet transfer 5  Supervision  (CGA)   Additional items Assist x 1; Armrests; Verbal cues   Ambulation/Elevation   Gait pattern Forward Flexion; Short stride   Gait Assistance 5  Supervision  (CGA)   Additional items Assist x 1   Assistive Device Rolling walker   Distance 150ft   Balance   Static Sitting Good   Dynamic Sitting Fair +   Static Standing Fair   Dynamic Standing Fair   Ambulatory Fair   Activity Tolerance   Activity Tolerance Patient limited by pain   Nurse Made Aware yes   Exercises   Hamstring Stretch PROM; Left   Quad Sets Sitting;20 reps;AROM; Bilateral   Glute Sets Sitting;20 reps;AROM   Hip Flexion Sitting;20 reps;AROM; Bilateral   Hip Abduction Sitting;20 reps;AROM; Bilateral   Hip Adduction Sitting;20 reps;AROM; Bilateral   Knee AROM Long Arc Quad Sitting;20 reps;AROM; Bilateral   Ankle Pumps Sitting;20 reps;AROM; Bilateral   Heel Cord Stretch Sitting;PROM   Assessment   Prognosis Fair   Problem List Decreased strength;Decreased range of motion; Impaired balance;Decreased mobility; Decreased safety awareness;Pain   Assessment Pt seen for PT treatment session this date with interventions consisting of gait training w/ emphasis on improving pt's ability to ambulate level surfaces x 150 ft with CGA provided by therapist with RW, Therapeutic exercise consisting of: AROM 20 reps B LE in sitting position and therapeutic activity consisting of training: supine<>sit transfers, sit<>stand transfers and stand pivot transfers towards both direction  Pt agreeable to PT treatment session upon arrival, pt found seated OOB in chair, in no apparent distress, A&O x 4 and responsive  In comparison to previous session, pt with improvements in amount of assistance required  Post session: chair alarm engaged and all needs in reach Continue to recommend STR at time of d/c in order to maximize pt's functional independence and safety w/ mobility  Pt continues to be functioning below baseline level, and remains limited 2* factors listed above and including decreased balance, decreased strength, decreased functional mobility and pain  PT will continue to see pt while here in order to address the deficits listed above and provide interventions consistent w/ POC in effort to achieve STGs  Goals   Patient Goals to go back to work   PT Treatment Day 1   Plan   Treatment/Interventions Functional transfer training;LE strengthening/ROM; Therapeutic exercise; Endurance training;Bed mobility;Gait training   Progress Progressing toward goals   PT Frequency   (3-5x/wk)   Recommendation   Recommendation Short-term skilled PT   Equipment Recommended Walker   PT - OK to Discharge Yes   Sridhar Conner PTA

## 2019-10-15 NOTE — PLAN OF CARE
Problem: PHYSICAL THERAPY ADULT  Goal: Performs mobility at highest level of function for planned discharge setting  See evaluation for individualized goals  Description  Treatment/Interventions: Functional transfer training, LE strengthening/ROM, Therapeutic exercise, Endurance training, Equipment eval/education, Gait training, Compensatory technique education, Spoke to nursing, Elevations, Spoke to case management  Equipment Recommended: Kaia Zaidi       See flowsheet documentation for full assessment, interventions and recommendations  Outcome: Progressing  Note:   Prognosis: Fair  Problem List: Decreased strength, Decreased range of motion, Impaired balance, Decreased mobility, Decreased safety awareness, Pain  Assessment: Pt seen for PT treatment session this date with interventions consisting of gait training w/ emphasis on improving pt's ability to ambulate level surfaces x 150 ft with CGA provided by therapist with RW, Therapeutic exercise consisting of: AROM 20 reps B LE in sitting position and therapeutic activity consisting of training: supine<>sit transfers, sit<>stand transfers and stand pivot transfers towards both direction  Pt agreeable to PT treatment session upon arrival, pt found seated OOB in chair, in no apparent distress, A&O x 4 and responsive  In comparison to previous session, pt with improvements in amount of assistance required  Post session: chair alarm engaged and all needs in reach Continue to recommend STR at time of d/c in order to maximize pt's functional independence and safety w/ mobility  Pt continues to be functioning below baseline level, and remains limited 2* factors listed above and including decreased balance, decreased strength, decreased functional mobility and pain  PT will continue to see pt while here in order to address the deficits listed above and provide interventions consistent w/ POC in effort to achieve STGs          Recommendation: Short-term skilled PT     PT - OK to Discharge: Yes  Divine Cast, PTA    See flowsheet documentation for full assessment

## 2019-10-15 NOTE — DISCHARGE SUMMARY
Discharge- Amina Agarwal 1935, 82 y o  female MRN: [id number]    Unit/Bed#: -02 Encounter: [id number]    Primary Care Provider: Isela Hernández MD   Date and time admitted to hospital: 10/11/2019  6:48 PM        * Atrial fibrillation with rapid ventricular response ([facility] )  Assessment & Plan  · More or less rate controlled  · Status post a Cardizem drip - continue diltiazem 240 mg daily by mouth  · Currently on Eliquis for anticoagulation purposes  · Cardiology input appreciated  · Patient is stable from a cardiac standpoint for discharge  · Okay for discharge to the [facility] on diltiazem, metoprolol and apixaban    COPD (chronic obstructive pulmonary disease) ([facility] )  Assessment & Plan  · Continue steroid taper - patient will need prednisone 30 mg x1 day, then 20 mg x3 days and then 10 mg x3 days and then stop    Chronic anxiety  Assessment & Plan  · Continue Xanax at night    Chronic low back pain  Assessment & Plan  · Status post PT and OT evaluation  · Okay for discharge to the [facility] today    Chronic respiratory failure with hypoxia ([facility] )  Assessment & Plan  · Patient wears 2 L oxygen at home  · Stable and at baseline    Debility  Assessment & Plan  · Multifactorial with multiple recent hospitalizations, diarrhea, atrial fibrillation, chronic back pain  · Patient is now agreeable to short-term rehab stay  · Medically stable for discharge  · DC to the [facility]    Diarrhea  Assessment & Plan  · Patient with diarrhea recent colitis on CT  · Stool cultures are negative  · Stool for C diff is negative  · Patient is afebrile, no leukocytosis and procalcitonin testing is within normal limits  · Okay for Imodium use    Obesity  Assessment & Plan  · BMI is 34  · Weight loss counseling provided    Positive culture findings in sputum  Assessment & Plan  · Patient with continued cough and chronic respiratory failure requiring home oxygen  · Patient has had multiple hospitalizations over the last 3 weeks  · Sputum culture done on last admission showed Haemophilus influenza, MRSA, Klebsiella pneumoniae  · Discussed case with ID, given that patient had normal procalcitonin and is currently afebrile no need for treatment at this time  If patient does develop fever/leukocytosis/worsening symptoms will repeat imaging and treat accordingly          Discharging Physician / Practitioner: Galina Mendoza MD  PCP: González Dong MD  Admission Date:   Admission Orders (From admission, onward)     Ordered        10/11/19 2106  Inpatient Admission (expected length of stay for this patient Order details is greater than two midnights)  Once                   Discharge Date: 10/15/19    Resolved Problems  Date Reviewed: 10/11/2019    None          Consultations During Hospital Stay:  · Cardiology    Procedures Performed:   · None    Significant Findings / Test Results:   · 10/07/2019-2D echocardiogram - LEFT VENTRICLE: Systolic function was normal  Ejection fraction was estimated to be 60 %  There were no regional wall motion abnormalities  There was mild concentric hypertrophy  Doppler parameters were consistent with abnormal left ventricular relaxation (grade 1 diastolic dysfunction)  · Chest x-ray-no acute cardiopulmonary disease  Emphysema noted      Incidental Findings:   · None     Test Results Pending at Discharge (will require follow up): · None     Outpatient Tests Requested:  · None    Complications:     None    Reason for Admission:  AFib with RVR    Hospital Course:     Lam Farr is a 82 y o  female patient who originally presented to the hospital on 10/11/2019 due to generalized weakness and diarrhea  Please refer to the initial history and physical examination completed by Lisseth Conteh for the initial presenting features and complaints  In brief the patient is an 72-year-old female who has had multiple admissions in the recent past secondary to different reasons    This time she was admitted for generalized weakness and found to have AFib with RVR  She was started on a Cardizem drip and admitted to the ICU  A cardiology consultation was obtained  Patient was transitioned from IV Cardizem to p o  Cardizem  Her apixaban was continued for anticoagulation purposes  On 1 of the recent previous admissions she had had an echocardiogram completed, therefore no repeat echocardiogram was performed on this particular admission  Patient also had complaints of diarrhea  Procalcitonin testing, stool for C diff, fecal leukocytes, and stool culture were all negative  Patient was given Imodium after an infection workup was completed  Patient was continued on her pre-admission medications at the preadmission dosages  One of these included a prednisone taper for recent exacerbation of airway disease  Patient was deemed to be a good candidate for rehab by physical therapy and occupational therapy  Patient will be discharged to the [facility] nursing [facility] for rehabilitation purposes  She was otherwise discharged on all of her other pre-admission medications at the preadmission dosages  Please see above list of diagnoses and related plan for additional information  Condition at Discharge: good     Discharge Day Visit / Exam:     Subjective:  Patient seen and examined, is happy that she is finally getting "out of here"  Vitals: Blood Pressure: 110/60 (10/15/19 1100)  Pulse: 72 (10/15/19 1100)  Temperature: (!) 97 °F (36 1 °C) (10/15/19 1100)  Temp Source: Tympanic (10/15/19 1100)  Respirations: 20 (10/15/19 1100)  Height: 5' (152 4 cm)(Stated) (10/11/19 2303)  Weight - Scale: (!) 166 kg (365 lb 8 4 oz) (10/15/19 0600)  SpO2: 95 % (10/15/19 1359)  Exam:   Physical Exam   Constitutional: She is oriented to person, place, and time  She appears well-developed and well-nourished  HENT:   Head: Normocephalic and atraumatic     Nose: Nose normal    Mouth/Throat: Oropharynx is clear and moist    Eyes: Pupils are equal, round, and reactive to light  Conjunctivae and EOM are normal    Neck: Normal range of motion  Neck supple  No JVD present  No thyromegaly present  Cardiovascular: Normal rate, regular rhythm and intact distal pulses  Exam reveals no gallop and no friction rub  No murmur heard  Pulmonary/Chest: Effort normal and breath sounds normal  No respiratory distress  Abdominal: Soft  Bowel sounds are normal  She exhibits no distension and no mass  There is no tenderness  There is no guarding  Musculoskeletal: Normal range of motion  She exhibits no edema  Lymphadenopathy:     She has no cervical adenopathy  Neurological: She is alert and oriented to person, place, and time  No cranial nerve deficit  Skin: Skin is warm  No rash noted  No erythema  Psychiatric: She has a normal mood and affect  Her behavior is normal    Vitals reviewed  Discussion with Family:  No family members were present at the time of my discharge evaluation    Discharge instructions/Information to patient and family:   See after visit summary for information provided to patient and family  Provisions for Follow-Up Care:  See after visit summary for information related to follow-up care and any pertinent home health orders  Disposition:     [facility] at [address] [place] [address] SNF:   · Not Applicable to this Patient - Not Applicable to this Patient    Planned Readmission:    None     Discharge Statement:  I spent 40 minutes discharging the patient  This time was spent on the day of discharge  I had direct contact with the patient on the day of discharge  Greater than 50% of the total time was spent examining patient, answering all patient questions, arranging and discussing plan of care with patient as well as directly providing post-discharge instructions  Additional time then spent on discharge activities      Discharge Medications:  See after visit summary for reconciled discharge medications provided to patient and family        ** Please Note: This note has been constructed using a voice recognition system **

## 2019-10-15 NOTE — ASSESSMENT & PLAN NOTE
· More or less rate controlled  · Status post a Cardizem drip - continue diltiazem 240 mg daily by mouth  · Currently on Eliquis for anticoagulation purposes  · Cardiology input appreciated  · Patient is stable from a cardiac standpoint for discharge  · Okay for discharge to the [facility] on diltiazem, metoprolol and apixaban

## 2019-10-15 NOTE — NURSING NOTE
Patient belongings returned, IV discontinued without incident, AVS printed for facility, report called, no questions at this time, verbalizes intent to comply with follow-up care

## 2019-10-16 LAB
BACTERIA BLD CULT: NORMAL
BACTERIA BLD CULT: NORMAL

## 2019-10-17 NOTE — H&P
Shwetha,#  HCN:[id number] F  CXY:[id number]    ICY:[id number]  Adm Date: 10/15/2019 1526  3:26 PM   ATT PHY: [phone]         Chief Complaint:  Short-term rehabilitation due to generalized weakness    History of Presenting Illness: Magnolia Conklin is a(n) 82y o  year old female was admitted from the hospital to [place] [address] for short-term rehabilitation due to generalized weakness and acute onset of diarrhea  Patient examined at bedside  Patient reported that it all started when she received an injection on her lower back in the emergency department  Since then patient reports that she has diarrhea every time she eats  Patient was initially admitted to the hospital and a workup including C diff was found to be negative  Patient is currently receiving Imodium  Patient is admitted to receive physical therapy to regain strength before she can go home  Allergies   Allergen Reactions    Fluticasone        No current facility-administered medications on file prior to encounter        Current Outpatient Medications on File Prior to Encounter   Medication Sig Dispense Refill    Albuterol Sulfate 108 (90 Base) MCG/ACT AEPB Inhale 2 puffs 3 (three) times a day as needed      alendronate (FOSAMAX) 70 mg tablet Take 70 mg by mouth every 7 days      ALPRAZolam (XANAX) 0 5 mg tablet Take 0 5 mg by mouth daily at bedtime as needed      apixaban (ELIQUIS) 5 mg Take 1 tablet (5 mg total) by mouth 2 (two) times a day 60 tablet 0    Calcium Carb-Cholecalciferol (CALCIUM 1000 + D PO) Take 1,000 mg by mouth daily      diltiazem (CARDIZEM CD) 240 mg 24 hr capsule Take 1 capsule (240 mg total) by mouth daily  0    famotidine (PEPCID) 40 MG tablet Take 40 mg by mouth as needed       fluticasone-umeclidinium-vilanterol (TRELEGY ELLIPTA) 100-62 5-25 MCG/INH inhaler 1 puff daily      furosemide (LASIX) 20 mg tablet Take 20 mg by mouth daily      ipratropium (ATROVENT) 0 02 % nebulizer solution Take 0 5 mg by nebulization 4 (four) times a day      ipratropium-albuterol (COMBIVENT RESPIMAT) inhaler Every 6 hours      loperamide (IMODIUM) 2 mg capsule Take 1 capsule (2 mg total) by mouth 3 (three) times a day as needed for diarrhea 30 capsule 0    metoprolol tartrate (LOPRESSOR) 25 mg tablet Take 1 tablet (25 mg total) by mouth every 12 (twelve) hours  0    montelukast (SINGULAIR) 10 mg tablet Take 10 mg by mouth daily at bedtime      potassium chloride (MICRO-K) 10 MEQ CR capsule Take 10 mEq by mouth daily      predniSONE 10 mg tablet Take 3 tablets (30 mg total) by mouth daily X1 day, then 2 tablets (20 mg total) by mouth daily for 3 days, then 1 tablet (10 mg total) by mouth daily for 3 days then stop  0       Active Ambulatory Problems     Diagnosis Date Noted    COPD (chronic obstructive pulmonary disease) ([facility] ) 12/28/2018    Cardiac disease 12/28/2018    Diverticulosis of intestine with bleeding 12/28/2018    Diarrhea 12/29/2018    Colorectal polyps 01/21/2019    Effusion of bursa of left elbow 09/23/2019    Cardiomegaly 01/23/2018    Chronic anxiety 01/23/2018    Chronic cystitis 12/11/2013    Chronic low back pain 08/13/2018    Congestive heart failure ([facility] ) 01/23/2018    Deviated nasal septum 10/20/2017    Diverticulosis of colon 01/21/2019    Gastroesophageal reflux disease without esophagitis 03/28/2018    Gout 07/20/2016    History of angioedema 11/18/2017    History of colonic polyps 01/21/2019    Lumbar radiculopathy 08/13/2018    Macular degeneration of both eyes 04/18/2019    Obesity 02/28/2018    Osteoporosis 03/08/2018    Other specified forms of chronic ischemic heart disease 12/11/2013    Seasonal allergies 05/02/2018    Panic attack 01/23/2018    Vocal cord dysfunction 10/20/2017    Colitis presumed infectious 10/03/2019    Atrial fibrillation with rapid ventricular response ([facility] ) 10/06/2019    Left elbow pain 10/06/2019    Chronic respiratory failure with hypoxia ([address] ) 10/07/2019    Debility 10/11/2019    Positive culture findings in sputum 10/12/2019     Resolved Ambulatory Problems     Diagnosis Date Noted    Rectal bleed 2018    Asthma 2018    Herpes zoster 2012     Past Medical History:   Diagnosis Date    Atrial fibrillation     Blurred vision     Colitis     Diverticulosis     DJD (degenerative joint disease)     Emphysema lung     Gait abnormality        Past Surgical History:   Procedure Laterality Date    BLADDER SURGERY      COLON SURGERY      COLONOSCOPY W/ POLYPECTOMY N/A 2019    Procedure: COLONOSCOPY W/ POLYPECTOMY;  Surgeon: Judson Irvin MD;  Location: [address] [place] [address] GI LAB; Service: General    SMALL INTESTINE SURGERY         Social History:   Social History     Socioeconomic History    Marital status:       Spouse name: None    Number of children: None    Years of education: None    Highest education level: None   Occupational History    None   Social Needs    Financial resource strain: None    Food insecurity:     Worry: None     Inability: None    Transportation needs:     Medical: None     Non-medical: None   Tobacco Use    Smoking status: Former Smoker     Last attempt to quit: 2013     Years since quittin 8    Smokeless tobacco: Never Used   Substance and Sexual Activity    Alcohol use: Not Currently     Frequency: Never     Binge frequency: Never    Drug use: No    Sexual activity: Not Currently   Lifestyle    Physical activity:     Days per week: None     Minutes per session: None    Stress: None   Relationships    Social connections:     Talks on phone: None     Gets together: None     Attends Scientologist service: None     Active member of club or organization: None     Attends meetings of clubs or organizations: None     Relationship status: None    Intimate partner violence:     Fear of current or ex partner: None     Emotionally abused: None     Physically abused: None     Forced sexual activity: None   Other Topics Concern    None   Social History Narrative    None       Family History:   Family History   Problem Relation Age of Onset    Heart disease Mother        Review of Systems   Gastrointestinal: Positive for diarrhea  Musculoskeletal: Positive for arthralgias, back pain and gait problem  All other systems reviewed and are negative  Physical Exam   Vitals:  Weight on admission 157 2 lb  Temperature 97 2°, heart rate 89, respirations 20, blood pressure 110/52, O2 sats 92% on 2 L of oxygen via nasal cannula  Constitutional: Awake and Alert  Well-developed and well-nourished  No distress  HENT: PERR,EOMI, conjunctiva normal  Head: Normocephalic and atraumatic  Mouth/Throat: Oropharynx is clear and moist     Eyes: Conjunctivae and EOM are normal  Pupils are equal, round, and reactive to light  Right and left eye exhibits no discharge  Neck: Neck supple  No tracheal deviation present  No thyromegaly present  Cardiovascular: Normal rate, regular rhythm and normal heart sounds  Exam reveals no friction rub  No murmur heard  Pulmonary/Chest: Effort normal and breath sounds normal  No respiratory distress  She has no wheezes  Abdominal: Soft  Bowel sounds are normal  She exhibits no distension  There is no tenderness  There is no rebound and no guarding  Neurological: Cranial Nerves grossly intact  No sensory deficit  Coordination normal    Musculoskeletal:   Nontender spine  Skin: Skin is warm and dry  No rash noted  No diaphoresis  No erythema  No edema  No cyanosis  Hazeline Scot is a(n) 82y o  year old female with generalized weakness for short-term rehabilitation    1  Generalized weakness/gait abnormality  Patient will be receiving extensive PT OT as a part of rehabilitation  2  Acute diarrhea  Patient's workup including C diff test were negative  Patient may continue to receive Imodium  I have change patient's diet to lactose-free diet  I have consulted dietitian for further evaluation and suggestion of changes in patient's diet  3  Cardiac with history of hypertension, atrial fibrillation  Patient will be continued on Eliquis, diltiazem, Lasix metoprolol and potassium chloride  4  COPD  Patient is getting Breo Ellipta, Combivent, albuterol, ipratropium nebs and montelukast along with oxygen on as-needed basis via nasal cannula  Patient is also on low-dose prednisone taper dose  5  Osteoporosis  Patient is on alendronate along with calcium and vitamin-D  I will get vitamin-D levels for the patient  6  DJD/osteoarthritis  Patient may get Tylenol on as needed basis  7  GERD  Patient is on famotidine  Prognosis: fair  Discharge Plan: in progress  Advanced Directives: I have discussed in detail the patient the advanced directives  Patient has appointed one of her close friend Addie Turner as her POA and her phone number is [phone]  Patient also claims that she has living will with advanced directives  When discussing cardiac and pulmonary resuscitation efforts with the patient,wishes to be FULL CODE  I have spent more than 50 minutes gathering data, doing physical examination, and discussing the advanced directives, which was witnessed by caring staff

## 2019-10-18 LAB
ANION GAP SERPL CALCULATED.3IONS-SCNC: 9 MMOL/L (ref 4–13)
BUN SERPL-MCNC: 18 MG/DL (ref 7–25)
CALCIUM SERPL-MCNC: 9.3 MG/DL (ref 8.6–10.5)
CHLORIDE SERPL-SCNC: 99 MMOL/L (ref 98–107)
CO2 SERPL-SCNC: 29 MMOL/L (ref 21–31)
CREAT SERPL-MCNC: 0.71 MG/DL (ref 0.6–1.2)
GFR SERPL CREATININE-BSD FRML MDRD: 80 ML/MIN/1.73SQ M
GLUCOSE SERPL-MCNC: 152 MG/DL (ref 65–99)
POTASSIUM SERPL-SCNC: 3.4 MMOL/L (ref 3.5–5.5)
PREALB SERPL-MCNC: 32.3 MG/DL (ref 18–40)
SODIUM SERPL-SCNC: 137 MMOL/L (ref 134–143)
VIT B12 SERPL-MCNC: 1695 PG/ML (ref 100–900)

## 2019-10-18 PROCEDURE — 80048 BASIC METABOLIC PNL TOTAL CA: CPT | Performed by: FAMILY MEDICINE

## 2019-10-18 PROCEDURE — 82607 VITAMIN B-12: CPT | Performed by: FAMILY MEDICINE

## 2019-10-18 PROCEDURE — 84134 ASSAY OF PREALBUMIN: CPT | Performed by: FAMILY MEDICINE

## 2019-10-19 LAB — 25(OH)D3 SERPL-MCNC: 19.9 NG/ML (ref 30–100)

## 2019-10-19 PROCEDURE — 82306 VITAMIN D 25 HYDROXY: CPT | Performed by: FAMILY MEDICINE

## 2019-10-23 NOTE — DISCHARGE SUMMARY
Discharge Summary -   Haleigh Rico 82 y o  female MRN: [id number]  Unit/Bed#: [facility]-01 Encounter: [id number]    Admission Date: 10/15/2019     Discharge Date:  10/25/2019    Admitting Diagnosis: Afib ([address] ) [I48 91]  Generalized weakness  Ambulatory dysfunction    Discharge Diagnosis:   Improved ambulation  Improved generalized weakness    Attending:  Olayinka Sim MD    Hospital Course: Haleigh Rico is 81-year-old who was admitted to [facility] for short-term rehabilitation following new onset atrial fibrillation, diarrhea along with generalized weakness and ambulatory dysfunction  Patient was admitted to receive physical therapy and occupational therapy  During the short course of her stay at [facility] patient had no complications  Patient achieved her rehabilitation goals quickly  Patient was cleared for discharge by the rehab team     Patient examined at bedside patient's vitals were reviewed which were found to be within normal limits  Patient's physical examination was also found to be within normal limits  Patient is requesting all her prescriptions  Patient will also continue to receive outpatient based physical therapy and occupational therapy for which a script has been written for the patient as well  During this admission it was found that patient has newly diagnosed vitamin-D deficiency for which patient was put on vitamin-D bolus doses  Condition at Discharge:  FAIR     Discharge instructions/Information to patient and family:   See after visit summary for information provided to patient and family  Provisions for Follow-Up Care:  See after visit summary for information related to follow-up care and any pertinent home health orders  Disposition:   Home    Discharge Statement   I spent 50 minutes discharging the patient  This time was spent on the day of discharge  I had direct contact with the patient on the day of discharge       Discharge Medications:  See after visit summary for reconciled discharge medications provided to patient and family

## 2019-10-24 LAB
ANION GAP SERPL CALCULATED.3IONS-SCNC: 5 MMOL/L (ref 4–13)
BUN SERPL-MCNC: 9 MG/DL (ref 7–25)
CALCIUM SERPL-MCNC: 8.6 MG/DL (ref 8.6–10.5)
CHLORIDE SERPL-SCNC: 103 MMOL/L (ref 98–107)
CO2 SERPL-SCNC: 28 MMOL/L (ref 21–31)
CREAT SERPL-MCNC: 0.54 MG/DL (ref 0.6–1.2)
GFR SERPL CREATININE-BSD FRML MDRD: 88 ML/MIN/1.73SQ M
GLUCOSE SERPL-MCNC: 112 MG/DL (ref 65–99)
POTASSIUM SERPL-SCNC: 3.5 MMOL/L (ref 3.5–5.5)
SODIUM SERPL-SCNC: 136 MMOL/L (ref 134–143)

## 2019-10-24 PROCEDURE — 80048 BASIC METABOLIC PNL TOTAL CA: CPT | Performed by: FAMILY MEDICINE

## 2019-11-01 ENCOUNTER — HOSPITAL ENCOUNTER (INPATIENT)
Facility: HOSPITAL | Age: 84
LOS: 2 days | Discharge: HOME HEALTH | DRG: 392 | End: 2019-11-04
Attending: EMERGENCY MEDICINE | Admitting: FAMILY MEDICINE
Payer: MEDICARE

## 2019-11-01 DIAGNOSIS — I48.91 ATRIAL FIBRILLATION (HCC): ICD-10-CM

## 2019-11-01 DIAGNOSIS — I50.9 CONGESTIVE HEART FAILURE (HCC): ICD-10-CM

## 2019-11-01 DIAGNOSIS — J44.9 COPD (CHRONIC OBSTRUCTIVE PULMONARY DISEASE) (HCC): Chronic | ICD-10-CM

## 2019-11-01 DIAGNOSIS — M81.0 OSTEOPOROSIS: ICD-10-CM

## 2019-11-01 DIAGNOSIS — R19.7 DIARRHEA, UNSPECIFIED TYPE: Primary | ICD-10-CM

## 2019-11-01 DIAGNOSIS — R26.9 GAIT DISTURBANCE: ICD-10-CM

## 2019-11-01 DIAGNOSIS — R19.7 DIARRHEA: ICD-10-CM

## 2019-11-01 DIAGNOSIS — E87.6 HYPOKALEMIA: ICD-10-CM

## 2019-11-01 DIAGNOSIS — J30.2 SEASONAL ALLERGIES: ICD-10-CM

## 2019-11-01 DIAGNOSIS — I48.91 ATRIAL FIBRILLATION WITH RAPID VENTRICULAR RESPONSE (HCC): ICD-10-CM

## 2019-11-01 DIAGNOSIS — K59.1 FUNCTIONAL DIARRHEA: ICD-10-CM

## 2019-11-01 DIAGNOSIS — K21.9 GASTROESOPHAGEAL REFLUX DISEASE WITHOUT ESOPHAGITIS: ICD-10-CM

## 2019-11-01 DIAGNOSIS — R53.81 DEBILITY: ICD-10-CM

## 2019-11-01 LAB
ALBUMIN SERPL BCP-MCNC: 3.7 G/DL (ref 3.5–5.7)
ALP SERPL-CCNC: 94 U/L (ref 55–165)
ALT SERPL W P-5'-P-CCNC: 28 U/L (ref 7–52)
ANION GAP SERPL CALCULATED.3IONS-SCNC: 8 MMOL/L (ref 4–13)
AST SERPL W P-5'-P-CCNC: 17 U/L (ref 13–39)
BASOPHILS # BLD AUTO: 0 THOUSANDS/ΜL (ref 0–0.1)
BASOPHILS NFR BLD AUTO: 0 % (ref 0–2)
BILIRUB SERPL-MCNC: 0.4 MG/DL (ref 0.2–1)
BUN SERPL-MCNC: 10 MG/DL (ref 7–25)
CALCIUM SERPL-MCNC: 8.9 MG/DL (ref 8.6–10.5)
CHLORIDE SERPL-SCNC: 106 MMOL/L (ref 98–107)
CO2 SERPL-SCNC: 28 MMOL/L (ref 21–31)
CREAT SERPL-MCNC: 0.52 MG/DL (ref 0.6–1.2)
EOSINOPHIL # BLD AUTO: 0.1 THOUSAND/ΜL (ref 0–0.61)
EOSINOPHIL NFR BLD AUTO: 1 % (ref 0–5)
ERYTHROCYTE [DISTWIDTH] IN BLOOD BY AUTOMATED COUNT: 16.2 % (ref 11.5–14.5)
GFR SERPL CREATININE-BSD FRML MDRD: 89 ML/MIN/1.73SQ M
GLUCOSE SERPL-MCNC: 116 MG/DL (ref 65–99)
HCT VFR BLD AUTO: 32.9 % (ref 42–47)
HGB BLD-MCNC: 10.5 G/DL (ref 12–16)
LYMPHOCYTES # BLD AUTO: 1.4 THOUSANDS/ΜL (ref 0.6–4.47)
LYMPHOCYTES NFR BLD AUTO: 25 % (ref 21–51)
MCH RBC QN AUTO: 27.4 PG (ref 26–34)
MCHC RBC AUTO-ENTMCNC: 31.8 G/DL (ref 31–37)
MCV RBC AUTO: 86 FL (ref 81–99)
MONOCYTES # BLD AUTO: 0.7 THOUSAND/ΜL (ref 0.17–1.22)
MONOCYTES NFR BLD AUTO: 12 % (ref 2–12)
NEUTROPHILS # BLD AUTO: 3.4 THOUSANDS/ΜL (ref 1.4–6.5)
NEUTS SEG NFR BLD AUTO: 61 % (ref 42–75)
PLATELET # BLD AUTO: 225 THOUSANDS/UL (ref 149–390)
PMV BLD AUTO: 7.5 FL (ref 8.6–11.7)
POTASSIUM SERPL-SCNC: 3.6 MMOL/L (ref 3.5–5.5)
PROT SERPL-MCNC: 6.3 G/DL (ref 6.4–8.9)
RBC # BLD AUTO: 3.83 MILLION/UL (ref 3.9–5.2)
SODIUM SERPL-SCNC: 142 MMOL/L (ref 134–143)
WBC # BLD AUTO: 5.6 THOUSAND/UL (ref 4.8–10.8)

## 2019-11-01 PROCEDURE — 96360 HYDRATION IV INFUSION INIT: CPT

## 2019-11-01 PROCEDURE — 99285 EMERGENCY DEPT VISIT HI MDM: CPT

## 2019-11-01 PROCEDURE — 85025 COMPLETE CBC W/AUTO DIFF WBC: CPT | Performed by: EMERGENCY MEDICINE

## 2019-11-01 PROCEDURE — 87505 NFCT AGENT DETECTION GI: CPT | Performed by: EMERGENCY MEDICINE

## 2019-11-01 PROCEDURE — 94760 N-INVAS EAR/PLS OXIMETRY 1: CPT

## 2019-11-01 PROCEDURE — 87493 C DIFF AMPLIFIED PROBE: CPT | Performed by: EMERGENCY MEDICINE

## 2019-11-01 PROCEDURE — 36415 COLL VENOUS BLD VENIPUNCTURE: CPT | Performed by: EMERGENCY MEDICINE

## 2019-11-01 PROCEDURE — 80053 COMPREHEN METABOLIC PANEL: CPT | Performed by: EMERGENCY MEDICINE

## 2019-11-01 RX ORDER — B-COMPLEX WITH VITAMIN C
1 TABLET ORAL 2 TIMES DAILY WITH MEALS
Status: DISCONTINUED | OUTPATIENT
Start: 2019-11-02 | End: 2019-11-04 | Stop reason: HOSPADM

## 2019-11-01 RX ORDER — DILTIAZEM HYDROCHLORIDE 240 MG/1
240 CAPSULE, COATED, EXTENDED RELEASE ORAL DAILY
Status: DISCONTINUED | OUTPATIENT
Start: 2019-11-02 | End: 2019-11-04 | Stop reason: HOSPADM

## 2019-11-01 RX ORDER — ALPRAZOLAM 0.5 MG/1
0.5 TABLET ORAL
Status: DISCONTINUED | OUTPATIENT
Start: 2019-11-01 | End: 2019-11-04 | Stop reason: HOSPADM

## 2019-11-01 RX ORDER — MONTELUKAST SODIUM 10 MG/1
10 TABLET ORAL
Status: DISCONTINUED | OUTPATIENT
Start: 2019-11-01 | End: 2019-11-04 | Stop reason: HOSPADM

## 2019-11-01 RX ORDER — IPRATROPIUM BROMIDE AND ALBUTEROL SULFATE 2.5; .5 MG/3ML; MG/3ML
3 SOLUTION RESPIRATORY (INHALATION)
Status: DISCONTINUED | OUTPATIENT
Start: 2019-11-02 | End: 2019-11-02

## 2019-11-01 RX ORDER — FAMOTIDINE 20 MG/1
40 TABLET, FILM COATED ORAL DAILY
Status: DISCONTINUED | OUTPATIENT
Start: 2019-11-02 | End: 2019-11-01

## 2019-11-01 RX ORDER — ONDANSETRON 2 MG/ML
4 INJECTION INTRAMUSCULAR; INTRAVENOUS EVERY 6 HOURS PRN
Status: DISCONTINUED | OUTPATIENT
Start: 2019-11-01 | End: 2019-11-04 | Stop reason: HOSPADM

## 2019-11-01 RX ORDER — ACETAMINOPHEN 160 MG/1
500 BAR, CHEWABLE ORAL EVERY 6 HOURS PRN
Status: DISCONTINUED | OUTPATIENT
Start: 2019-11-01 | End: 2019-11-02

## 2019-11-01 RX ORDER — CHOLESTYRAMINE LIGHT 4 G/5.7G
4 POWDER, FOR SUSPENSION ORAL 2 TIMES DAILY
Status: DISCONTINUED | OUTPATIENT
Start: 2019-11-02 | End: 2019-11-02

## 2019-11-01 RX ORDER — ALBUTEROL SULFATE 90 UG/1
2 AEROSOL, METERED RESPIRATORY (INHALATION) EVERY 4 HOURS PRN
Status: DISCONTINUED | OUTPATIENT
Start: 2019-11-01 | End: 2019-11-04 | Stop reason: HOSPADM

## 2019-11-01 RX ORDER — BUDESONIDE AND FORMOTEROL FUMARATE DIHYDRATE 80; 4.5 UG/1; UG/1
2 AEROSOL RESPIRATORY (INHALATION) 2 TIMES DAILY
Status: DISCONTINUED | OUTPATIENT
Start: 2019-11-02 | End: 2019-11-04 | Stop reason: HOSPADM

## 2019-11-01 RX ORDER — SODIUM CHLORIDE AND POTASSIUM CHLORIDE .9; .15 G/100ML; G/100ML
150 SOLUTION INTRAVENOUS CONTINUOUS
Status: DISCONTINUED | OUTPATIENT
Start: 2019-11-01 | End: 2019-11-03

## 2019-11-01 RX ORDER — POTASSIUM CHLORIDE 20MEQ/15ML
20 LIQUID (ML) ORAL DAILY
Status: DISCONTINUED | OUTPATIENT
Start: 2019-11-02 | End: 2019-11-01

## 2019-11-01 RX ORDER — ALENDRONATE SODIUM 70 MG/1
70 TABLET ORAL
Status: DISCONTINUED | OUTPATIENT
Start: 2019-11-01 | End: 2019-11-04 | Stop reason: HOSPADM

## 2019-11-01 RX ORDER — FAMOTIDINE 20 MG/1
40 TABLET, FILM COATED ORAL ONCE
Status: COMPLETED | OUTPATIENT
Start: 2019-11-01 | End: 2019-11-01

## 2019-11-01 RX ORDER — PANTOPRAZOLE SODIUM 40 MG/1
40 TABLET, DELAYED RELEASE ORAL
Status: DISCONTINUED | OUTPATIENT
Start: 2019-11-02 | End: 2019-11-04 | Stop reason: HOSPADM

## 2019-11-01 RX ADMIN — MONTELUKAST SODIUM 10 MG: 10 TABLET, COATED ORAL at 23:45

## 2019-11-01 RX ADMIN — POTASSIUM CHLORIDE AND SODIUM CHLORIDE 150 ML/HR: 900; 150 INJECTION, SOLUTION INTRAVENOUS at 23:44

## 2019-11-01 RX ADMIN — FAMOTIDINE 40 MG: 20 TABLET ORAL at 23:44

## 2019-11-01 RX ADMIN — ALPRAZOLAM 0.5 MG: 0.5 TABLET ORAL at 23:45

## 2019-11-01 RX ADMIN — METOPROLOL TARTRATE 25 MG: 25 TABLET, FILM COATED ORAL at 23:44

## 2019-11-01 RX ADMIN — SODIUM CHLORIDE 1000 ML: 0.9 INJECTION, SOLUTION INTRAVENOUS at 20:44

## 2019-11-02 LAB
ALBUMIN SERPL BCP-MCNC: 3.2 G/DL (ref 3.5–5.7)
ALP SERPL-CCNC: 85 U/L (ref 55–165)
ALT SERPL W P-5'-P-CCNC: 24 U/L (ref 7–52)
ANION GAP SERPL CALCULATED.3IONS-SCNC: 5 MMOL/L (ref 4–13)
AST SERPL W P-5'-P-CCNC: 14 U/L (ref 13–39)
BASOPHILS # BLD AUTO: 0 THOUSANDS/ΜL (ref 0–0.1)
BASOPHILS NFR BLD AUTO: 0 % (ref 0–2)
BILIRUB SERPL-MCNC: 0.3 MG/DL (ref 0.2–1)
BUN SERPL-MCNC: 6 MG/DL (ref 7–25)
C DIFF TOX GENS STL QL NAA+PROBE: NORMAL
CALCIUM SERPL-MCNC: 8 MG/DL (ref 8.6–10.5)
CAMPYLOBACTER DNA SPEC NAA+PROBE: NORMAL
CHLORIDE SERPL-SCNC: 107 MMOL/L (ref 98–107)
CO2 SERPL-SCNC: 26 MMOL/L (ref 21–31)
CREAT SERPL-MCNC: 0.42 MG/DL (ref 0.6–1.2)
EOSINOPHIL # BLD AUTO: 0.1 THOUSAND/ΜL (ref 0–0.61)
EOSINOPHIL NFR BLD AUTO: 2 % (ref 0–5)
ERYTHROCYTE [DISTWIDTH] IN BLOOD BY AUTOMATED COUNT: 16.4 % (ref 11.5–14.5)
GFR SERPL CREATININE-BSD FRML MDRD: 96 ML/MIN/1.73SQ M
GLUCOSE SERPL-MCNC: 107 MG/DL (ref 65–99)
HCT VFR BLD AUTO: 29.6 % (ref 42–47)
HGB BLD-MCNC: 9.2 G/DL (ref 12–16)
LYMPHOCYTES # BLD AUTO: 1 THOUSANDS/ΜL (ref 0.6–4.47)
LYMPHOCYTES NFR BLD AUTO: 22 % (ref 21–51)
MAGNESIUM SERPL-MCNC: 2 MG/DL (ref 1.9–2.7)
MCH RBC QN AUTO: 26.8 PG (ref 26–34)
MCHC RBC AUTO-ENTMCNC: 31.2 G/DL (ref 31–37)
MCV RBC AUTO: 86 FL (ref 81–99)
MONOCYTES # BLD AUTO: 0.4 THOUSAND/ΜL (ref 0.17–1.22)
MONOCYTES NFR BLD AUTO: 9 % (ref 2–12)
NEUTROPHILS # BLD AUTO: 2.9 THOUSANDS/ΜL (ref 1.4–6.5)
NEUTS SEG NFR BLD AUTO: 66 % (ref 42–75)
PLATELET # BLD AUTO: 187 THOUSANDS/UL (ref 149–390)
PMV BLD AUTO: 7.2 FL (ref 8.6–11.7)
POTASSIUM SERPL-SCNC: 3.6 MMOL/L (ref 3.5–5.5)
PROT SERPL-MCNC: 5.4 G/DL (ref 6.4–8.9)
RBC # BLD AUTO: 3.44 MILLION/UL (ref 3.9–5.2)
SALMONELLA DNA SPEC QL NAA+PROBE: NORMAL
SHIGA TOXIN STX GENE SPEC NAA+PROBE: NORMAL
SHIGELLA DNA SPEC QL NAA+PROBE: NORMAL
SODIUM SERPL-SCNC: 138 MMOL/L (ref 134–143)
WBC # BLD AUTO: 4.4 THOUSAND/UL (ref 4.8–10.8)

## 2019-11-02 PROCEDURE — 94640 AIRWAY INHALATION TREATMENT: CPT

## 2019-11-02 PROCEDURE — 80053 COMPREHEN METABOLIC PANEL: CPT | Performed by: FAMILY MEDICINE

## 2019-11-02 PROCEDURE — 94760 N-INVAS EAR/PLS OXIMETRY 1: CPT

## 2019-11-02 PROCEDURE — 83735 ASSAY OF MAGNESIUM: CPT | Performed by: FAMILY MEDICINE

## 2019-11-02 PROCEDURE — 85025 COMPLETE CBC W/AUTO DIFF WBC: CPT | Performed by: FAMILY MEDICINE

## 2019-11-02 PROCEDURE — 89055 LEUKOCYTE ASSESSMENT FECAL: CPT | Performed by: FAMILY MEDICINE

## 2019-11-02 RX ORDER — IPRATROPIUM BROMIDE AND ALBUTEROL SULFATE 2.5; .5 MG/3ML; MG/3ML
3 SOLUTION RESPIRATORY (INHALATION)
Status: DISCONTINUED | OUTPATIENT
Start: 2019-11-03 | End: 2019-11-04 | Stop reason: HOSPADM

## 2019-11-02 RX ORDER — ACETAMINOPHEN 325 MG/1
650 TABLET ORAL EVERY 6 HOURS PRN
Status: DISCONTINUED | OUTPATIENT
Start: 2019-11-02 | End: 2019-11-04 | Stop reason: HOSPADM

## 2019-11-02 RX ADMIN — DICLOFENAC 2 G: 10 GEL TOPICAL at 17:20

## 2019-11-02 RX ADMIN — IPRATROPIUM BROMIDE AND ALBUTEROL SULFATE 3 ML: 2.5; .5 SOLUTION RESPIRATORY (INHALATION) at 19:39

## 2019-11-02 RX ADMIN — CALCIUM CARBONATE-VITAMIN D TAB 500 MG-200 UNIT 1 TABLET: 500-200 TAB at 17:20

## 2019-11-02 RX ADMIN — IPRATROPIUM BROMIDE AND ALBUTEROL SULFATE 3 ML: 2.5; .5 SOLUTION RESPIRATORY (INHALATION) at 07:28

## 2019-11-02 RX ADMIN — PANTOPRAZOLE SODIUM 40 MG: 40 TABLET, DELAYED RELEASE ORAL at 05:55

## 2019-11-02 RX ADMIN — ACETAMINOPHEN 650 MG: 325 TABLET ORAL at 10:29

## 2019-11-02 RX ADMIN — METOPROLOL TARTRATE 25 MG: 25 TABLET, FILM COATED ORAL at 21:58

## 2019-11-02 RX ADMIN — METOPROLOL TARTRATE 25 MG: 25 TABLET, FILM COATED ORAL at 08:42

## 2019-11-02 RX ADMIN — MONTELUKAST SODIUM 10 MG: 10 TABLET, COATED ORAL at 21:48

## 2019-11-02 RX ADMIN — POTASSIUM CHLORIDE AND SODIUM CHLORIDE 150 ML/HR: 900; 150 INJECTION, SOLUTION INTRAVENOUS at 07:03

## 2019-11-02 RX ADMIN — ACETAMINOPHEN 650 MG: 325 TABLET ORAL at 20:29

## 2019-11-02 RX ADMIN — PSYLLIUM HUSK 1 PACKET: 3.4 POWDER ORAL at 17:20

## 2019-11-02 RX ADMIN — PSYLLIUM HUSK 1 PACKET: 3.4 POWDER ORAL at 21:58

## 2019-11-02 RX ADMIN — CALCIUM CARBONATE-VITAMIN D TAB 500 MG-200 UNIT 1 TABLET: 500-200 TAB at 08:40

## 2019-11-02 RX ADMIN — DICLOFENAC 2 G: 10 GEL TOPICAL at 10:18

## 2019-11-02 RX ADMIN — CHOLESTYRAMINE 4 G: 4 POWDER, FOR SUSPENSION ORAL at 08:39

## 2019-11-02 RX ADMIN — IPRATROPIUM BROMIDE AND ALBUTEROL SULFATE 3 ML: 2.5; .5 SOLUTION RESPIRATORY (INHALATION) at 01:22

## 2019-11-02 RX ADMIN — APIXABAN 5 MG: 5 TABLET, FILM COATED ORAL at 08:42

## 2019-11-02 RX ADMIN — BUDESONIDE AND FORMOTEROL FUMARATE DIHYDRATE 2 PUFF: 80; 4.5 AEROSOL RESPIRATORY (INHALATION) at 17:20

## 2019-11-02 RX ADMIN — APIXABAN 5 MG: 5 TABLET, FILM COATED ORAL at 17:20

## 2019-11-02 RX ADMIN — BUDESONIDE AND FORMOTEROL FUMARATE DIHYDRATE 2 PUFF: 80; 4.5 AEROSOL RESPIRATORY (INHALATION) at 08:39

## 2019-11-02 RX ADMIN — IPRATROPIUM BROMIDE AND ALBUTEROL SULFATE 3 ML: 2.5; .5 SOLUTION RESPIRATORY (INHALATION) at 13:47

## 2019-11-02 RX ADMIN — POTASSIUM CHLORIDE AND SODIUM CHLORIDE 150 ML/HR: 900; 150 INJECTION, SOLUTION INTRAVENOUS at 14:29

## 2019-11-02 RX ADMIN — POTASSIUM CHLORIDE AND SODIUM CHLORIDE 150 ML/HR: 900; 150 INJECTION, SOLUTION INTRAVENOUS at 21:48

## 2019-11-02 RX ADMIN — DILTIAZEM HYDROCHLORIDE 240 MG: 240 CAPSULE, COATED, EXTENDED RELEASE ORAL at 08:40

## 2019-11-02 NOTE — UTILIZATION REVIEW
Initial Clinical Review    Admission: Date/Time/Statement:  Placed in Observation status on 11/1 @ 22:06 changed to inpatient admission on 11/2 @ 1400  11/02/19 1400  Inpatient Admission      Transfer Service: Geriatric Medicine       Question Answer   Admitting Physician DALY DELACRUZ   Level of Care Med Surg   Estimated length of stay More than 2 Midnights   Certification I certify that inpatient services are medically necessary for this patient for a duration of greater than two midnights  See H&P and MD Progress Notes for additional information about the patient's course of treatment  ED Arrival Information     Expected Arrival Acuity Means of Arrival Escorted By Service Admission Type    - 11/1/2019 19:56 Urgent [facility] Urgent    Arrival Complaint    loose bowels, weakness        Chief Complaint   Patient presents with    Diarrhea     pt recently admitted to this facilty for complaints of same  pt reports 15 episodes loose stool today  offering no other acute complaints at this time      Assessment/Plan: 82y o  year old female who was admitted to the emergency department where she presented due to multiple episodes of diarrhea despite of taking Imodium to the maximum wound for 24 hours  Patient reported that yesterday she had 25 episodes of diarrhea  Patient was admitted to observation with  telemetry  for further workup and fluid resuscitation  11/2 - On examination at bedside, patient appears to be looking better  Patient reports that she does feel improved since yesterday  Today patient had 2 episodes of diarrhea after taking her lunch  Patient was earlier seen by Gastroenterology Dr Lawanda Bradley who thinks that patient's diarrhea is most likely secondary to medications versus microscopic colitis  She has put the patient on stool bulking agent Metamucil    See has suggested outpatient based colonoscopy with biopsies if diarrhea does not improve with stool bulking agents  Celiac disease workup and bacterial panel is still pending  Patient's C diff test was found to be negative and patient has also negative fecal leukocytes  GI consult -  Diarrhea 0- medication induced vs microscopic colitis - C diff negative - Stool studies pending - plan to bulk up stools with Metamucil s/p recent colonoscopy showing colon polyps pathology unknown    To consider OP colonoscopy with biopsies if no improvement     ED Triage Vitals   Temperature Pulse Respirations Blood Pressure SpO2   11/01/19 2246 11/01/19 1958 11/01/19 1958 11/01/19 1958 11/01/19 1958   (!) 97 1 °F (36 2 °C) 87 18 127/65 95 %      Temp Source Heart Rate Source Patient Position - Orthostatic VS BP Location FiO2 (%)   11/01/19 2246 11/01/19 2044 11/01/19 2044 11/01/19 2044 --   Temporal Monitor Lying Left arm       Pain Score       11/01/19 1958       No Pain        Wt Readings from Last 1 Encounters:   11/02/19 70 3 kg (155 lb)     Additional Vital Signs:  Date/Time  Temp  Pulse  Resp  BP  SpO2  O2 Device  Patient Position - Orthostatic VS   11/02/19 1300          97 %       11/02/19 1225  97 7 °F (36 5 °C)  88  20  114/76  97 %    Lying   11/02/19 0839    80    116/68         11/02/19 0700          97 %       11/02/19 0551  97 8 °F (36 6 °C)  78  20  112/55  97 %  Nasal cannula  2 L   Lying   11/02/19 0310  97 4 °F (36 3 °C)Abnormal   88  20  122/80  92 %  Nasal cannula  Lying   11/02/19 0122          93 %  Nasal cannula     11/02/19 0019            Nasal cannula     11/01/19 2300          92 %  Nasal cannula     11/01/19 2246  97 1 °F (36 2 °C)Abnormal   83  20  127/81  97 %  None (Room air)  Lying           Pertinent Labs/Diagnostic Test Results:   Results from last 7 days   Lab Units 11/02/19  0556 11/01/19 2014   WBC Thousand/uL 4 40* 5 60   HEMOGLOBIN g/dL 9 2* 10 5*   HEMATOCRIT % 29 6* 32 9*   PLATELETS Thousands/uL 187 225   NEUTROS ABS Thousands/µL 2 90 3 40     Results from last 7 days   Lab Units 11/02/19  0605 11/02/19  0556 11/01/19 2014   SODIUM mmol/L  --  138 142   POTASSIUM mmol/L  --  3 6 3 6   CHLORIDE mmol/L  --  107 106   CO2 mmol/L  --  26 28   ANION GAP mmol/L  --  5 8   BUN mg/dL  --  6* 10   CREATININE mg/dL  --  0 42* 0 52*   EGFR ml/min/1 73sq m  --  96 89   CALCIUM mg/dL  --  8 0* 8 9   MAGNESIUM mg/dL 2 0  --   --      Results from last 7 days   Lab Units 11/02/19  0556 11/01/19 2014   AST U/L 14 17   ALT U/L 24 28   ALK PHOS U/L 85 94   TOTAL PROTEIN g/dL 5 4* 6 3*   ALBUMIN g/dL 3 2* 3 7   TOTAL BILIRUBIN mg/dL 0 30 0 40     Results from last 7 days   Lab Units 11/02/19  0556 11/01/19 2014   GLUCOSE RANDOM mg/dL 107* 116*     Results from last 7 days   Lab Units 11/01/19 2101   C DIFF TOXIN B  NEGATIVE for C difficle toxin by PCR        Results from last 7 days   Lab Units 11/01/19 2101   SALMONELLA SP PCR  None Detected   SHIGELLA SP/ENTEROINVASIVE E  COLI (EIEC)  None Detected   CAMPYLOBACTER SP (JEJUNI AND COLI)  None Detected   SHIGA TOXIN 1/SHIGA TOXIN 2  None Detected       ED Treatment:   Medication Administration from 11/01/2019 1956 to 11/01/2019 2246       Date/Time Order Dose Route Action     11/01/2019 2044 sodium chloride 0 9 % bolus 1,000 mL 1,000 mL Intravenous New Bag        Past Medical History:   Diagnosis Date    Asthma     Atrial fibrillation     Blurred vision     Cardiac disease     Colitis     COPD (chronic obstructive pulmonary disease)     Diverticulosis     DJD (degenerative joint disease)     Emphysema lung     Gait abnormality      Present on Admission:  **None**      Admitting Diagnosis: Diarrhea [R19 7]  Age/Sex: 82 y o  female  Admission Orders:  Scheduled Medications:    Medications:  alendronate 70 mg Oral Q7 Days   apixaban 5 mg Oral BID   budesonide-formoterol 2 puff Inhalation BID   calcium carbonate-vitamin D 1 tablet Oral BID With Meals   diclofenac sodium 2 g Topical 4x Daily   diltiazem 240 mg Oral Daily ipratropium-albuterol 3 mL Nebulization Q6H   metoprolol tartrate 25 mg Oral Q12H CONG   montelukast 10 mg Oral HS   pantoprazole 40 mg Oral Early Morning   psyllium 1 packet Oral TID     Continuous IV Infusions:    sodium chloride 0 9 % with KCl 20 mEq/L 150 mL/hr Intravenous Continuous     PRN Meds:    acetaminophen 650 mg Oral Q6H PRN   albuterol 2 puff Inhalation Q4H PRN   ALPRAZolam 0 5 mg Oral HS PRN   ondansetron 4 mg Intravenous Q6H PRN       Nursing Orders - Telem - Daily weights -  Diet cardiac  Lactose restricted     Network Utilization Review Department  [email or web address]  ATTENTION: Please call with any questions or concerns to [phone] and carefully listen to the prompts so that you are directed to the right person  All voicemails are confidential   Vane Barahona all requests for admission clinical reviews, approved or denied determinations and any other requests to dedicated fax number below belonging to the campus where the patient is receiving treatment    FACILITY NAME UR FAX NUMBER   ADMISSION DENIALS (Administrative/Medical Necessity) [address] [facility] (Maternity/NICU/Pediatrics) [phone]   [facility] [address] [facility] [phone]   [address] [facility]  [place] [address] [facility] [phone]   Helon Milder [address] [phone]

## 2019-11-02 NOTE — SOCIAL WORK
Evaluated the pt at the bedside  Pt was admitted to the hospital for diarrhea  Explained the role of CM and the options of discharge planning  Pt was recently discharged from [facility] on 10/23/19  Pt lives in a mobile home with 3 SAVANNAH  Pt POA is the  Joseph José whom she had been a  prior to her admission to the SNF  Pt states since she has been home she did not see her PCP Dr Dimitri Kessler  She did see her "back Dr" this week  Has not been going out of the house d/t diarrhea  Pt reports she has been wearing her oxygen at home and taking her medications as prescribed  Pt has not gone to outpt PT/OT  Pt states "I am not going back to [facility]"  Pt states she wants to go home and her friend will transport her  Pt has had multiple admissions and continues to refuse C and SNF for STR  Pt states "I am going home, my  friend will take me"  Explained the Observation level of care and provided the booklet of same  Pt verbalized understanding of same  Patient/caregiver received discharge checklist   Content reviewed  Patient/caregiver encouraged to participate in discharge plan of care prior to discharge home  CM reviewed d/c planning process including the following: identifying help at home, patient preference for d/c planning needs, availability of treatment team to discuss questions or concerns patient and/or family may have regarding understanding medications and recognizing signs and symptoms once discharged  CM also encouraged patient to follow up with all recommended appointments after discharge  Patient advised of importance for patient and family to participate in managing patients medical well being

## 2019-11-02 NOTE — ED NOTES
"I'M NOT STAYING HERE I JUST WANT ENOUGH FLUIDS TO GET ME GOING UNTIL I CAN SEE MY FAMILY DOCTOR   I'M NOT STAYING HERE I'M SICK OF HOSPITALS"     Lazaro Ortiz RN  11/01/19 2009

## 2019-11-02 NOTE — NURSING NOTE
Stool sample went down to the lab, presently sitting oob in her chair in no acute distress, callbell in reach of the pt, chair alarm on

## 2019-11-02 NOTE — ED PROVIDER NOTES
History  Chief Complaint   Patient presents with    Diarrhea     pt recently admitted to this facilty for complaints of same  pt reports 15 episodes loose stool today  offering no other acute complaints at this time       PATIENT RETURNS TO THE ER BECAUSE OF PERSISTENT DIARRHEA  SHE REPORTS 23 EPISODES OF WATERY BOWEL MOVEMENTS TODAY  SHE IS VAGUE IN CANNOT QUANTIFY THE VOLUME OF EACH BOWEL MOVE  SHE DENIES ABDOMINAL PAIN  SHE DENIES FEVER CHILLS  SHE COMPLAINS FATIGUE AND DEHYDRATION  Prior to Admission Medications   Prescriptions Last Dose Informant Patient Reported? Taking?    ALPRAZolam (XANAX) 0 5 mg tablet   Yes No   Sig: Take 0 5 mg by mouth daily at bedtime as needed   Albuterol Sulfate 108 (90 Base) MCG/ACT AEPB   Yes No   Sig: Inhale 2 puffs 3 (three) times a day as needed   Calcium Carb-Cholecalciferol (CALCIUM 1000 + D PO)   Yes No   Sig: Take 1,000 mg by mouth daily   alendronate (FOSAMAX) 70 mg tablet  Self Yes No   Sig: Take 70 mg by mouth every 7 days   apixaban (ELIQUIS) 5 mg   No No   Sig: Take 1 tablet (5 mg total) by mouth 2 (two) times a day   diltiazem (CARDIZEM CD) 240 mg 24 hr capsule   No No   Sig: Take 1 capsule (240 mg total) by mouth daily   famotidine (PEPCID) 40 MG tablet   Yes No   Sig: Take 40 mg by mouth as needed    fluticasone-umeclidinium-vilanterol (TRELEGY ELLIPTA) 100-62 5-25 MCG/INH inhaler   Yes No   Si puff daily   furosemide (LASIX) 20 mg tablet  Self Yes No   Sig: Take 20 mg by mouth daily   ipratropium (ATROVENT) 0 02 % nebulizer solution   Yes No   Sig: Take 0 5 mg by nebulization 4 (four) times a day   ipratropium-albuterol (COMBIVENT RESPIMAT) inhaler   Yes No   Sig: Every 6 hours   loperamide (IMODIUM) 2 mg capsule   No No   Sig: Take 1 capsule (2 mg total) by mouth 3 (three) times a day as needed for diarrhea   metoprolol tartrate (LOPRESSOR) 25 mg tablet   No No   Sig: Take 1 tablet (25 mg total) by mouth every 12 (twelve) hours   montelukast (SINGULAIR) 10 mg tablet  Self Yes No   Sig: Take 10 mg by mouth daily at bedtime   potassium chloride (MICRO-K) 10 MEQ CR capsule  Self Yes No   Sig: Take 10 mEq by mouth daily   predniSONE 10 mg tablet   No No   Sig: Take 3 tablets (30 mg total) by mouth daily X1 day, then 2 tablets (20 mg total) by mouth daily for 3 days, then 1 tablet (10 mg total) by mouth daily for 3 days then stop      Facility-Administered Medications: None       Past Medical History:   Diagnosis Date    Asthma     Atrial fibrillation     Blurred vision     Cardiac disease     Colitis     COPD (chronic obstructive pulmonary disease)     Diverticulosis     DJD (degenerative joint disease)     Emphysema lung     Gait abnormality        Past Surgical History:   Procedure Laterality Date    BLADDER SURGERY      COLON SURGERY      COLONOSCOPY W/ POLYPECTOMY N/A 2019    Procedure: COLONOSCOPY W/ POLYPECTOMY;  Surgeon: Zenaida Pham MD;  Location: [facility] GI LAB; Service: General    SMALL INTESTINE SURGERY         Family History   Problem Relation Age of Onset    Heart disease Mother      I have reviewed and agree with the history as documented  Social History     Tobacco Use    Smoking status: Former Smoker     Last attempt to quit: 2013     Years since quittin 9    Smokeless tobacco: Never Used   Substance Use Topics    Alcohol use: Not Currently     Frequency: Never     Binge frequency: Never    Drug use: No        Review of Systems   Constitutional: Positive for fatigue  Negative for chills and fever  HENT: Negative  Eyes: Negative  Respiratory:        NO NEW CHANGES  ON CHRONIC O2 AT HOME   Cardiovascular:        NO NEW CHANGES, CHRONIC LOWER EXTREMITY EDEMA   Gastrointestinal: Positive for diarrhea  Negative for blood in stool and constipation  Genitourinary: Negative  Musculoskeletal: Negative  Neurological: Negative          Physical Exam  Physical Exam   Constitutional: She is oriented to person, place, and time  ELDERLY FEMALE WHO APPEARS TIRED BUT NOT TOXIC  HENT:   Head: Normocephalic  MUCOUS MEMBRANES ARE PERHAPS SLIGHTLY TACKY BUT OTHERWISE BENIGN  Eyes: Conjunctivae and EOM are normal  No scleral icterus  Neck: Normal range of motion  Neck supple  Cardiovascular: Normal rate, regular rhythm and normal heart sounds  Pulmonary/Chest: Effort normal and breath sounds normal  No stridor  No respiratory distress  She exhibits no tenderness  Abdominal: Soft  Bowel sounds are normal  She exhibits no distension and no mass  There is no tenderness  There is no rebound and no guarding  Musculoskeletal: Normal range of motion  She exhibits edema  TOPHACEOUS COLLECTION, CHRONIC, RIGHT ELBOW   Neurological: She is alert and oriented to person, place, and time  No cranial nerve deficit  She exhibits normal muscle tone  Skin: Capillary refill takes less than 2 seconds  No rash noted  No erythema  Vital Signs  ED Triage Vitals   Temp Pulse Respirations Blood Pressure SpO2   -- 11/01/19 1958 11/01/19 1958 11/01/19 1958 11/01/19 1958    87 18 127/65 95 %      Temp src Heart Rate Source Patient Position - Orthostatic VS BP Location FiO2 (%)   -- 11/01/19 2044 11/01/19 2044 11/01/19 2044 --    Monitor Lying Left arm       Pain Score       11/01/19 1958       No Pain           Vitals:    11/01/19 1958 11/01/19 2044 11/01/19 2208   BP: 127/65 107/65 151/65   Pulse: 87 79 89   Patient Position - Orthostatic VS:  Lying Sitting         Visual Acuity      ED Medications  Medications   sodium chloride 0 9 % bolus 1,000 mL (1,000 mL Intravenous New Bag 11/1/19 2044)       Diagnostic Studies  Results Reviewed     Procedure Component Value Units Date/Time    Stool Enteric Bacterial Panel by PCR [[id number]] Collected:  11/01/19 2101    Lab Status:   In process Specimen:  Stool from Rectum Updated:  11/01/19 2105    Clostridium difficile toxin by PCR [[id number]] Collected:  11/01/19 2101    Lab Status:   In process Specimen:  Stool from Rectum Updated:  11/01/19 2104    Comprehensive metabolic panel [[id number]]  (Abnormal) Collected:  11/01/19 2014    Lab Status:  Final result Specimen:  Blood from Arm, Right Updated:  11/01/19 2041     Sodium 142 mmol/L      Potassium 3 6 mmol/L      Chloride 106 mmol/L      CO2 28 mmol/L      ANION GAP 8 mmol/L      BUN 10 mg/dL      Creatinine 0 52 mg/dL      Glucose 116 mg/dL      Calcium 8 9 mg/dL      AST 17 U/L      ALT 28 U/L      Alkaline Phosphatase 94 U/L      Total Protein 6 3 g/dL      Albumin 3 7 g/dL      Total Bilirubin 0 40 mg/dL      eGFR 89 ml/min/1 73sq m     Narrative:       [place] guidelines for Chronic Kidney Disease (CKD):     Stage 1 with normal or high GFR (GFR > 90 mL/min/1 73 square meters)    Stage 2 Mild CKD (GFR = 60-89 mL/min/1 73 square meters)    Stage 3A Moderate CKD (GFR = 45-59 mL/min/1 73 square meters)    Stage 3B Moderate CKD (GFR = 30-44 mL/min/1 73 square meters)    Stage 4 Severe CKD (GFR = 15-29 mL/min/1 73 square meters)    Stage 5 End Stage CKD (GFR <15 mL/min/1 73 square meters)  Note: GFR calculation is accurate only with a steady state creatinine    CBC and differential [[id number]]  (Abnormal) Collected:  11/01/19 2014    Lab Status:  Final result Specimen:  Blood from Arm, Right Updated:  11/01/19 2026     WBC 5 60 Thousand/uL      RBC 3 83 Million/uL      Hemoglobin 10 5 g/dL      Hematocrit 32 9 %      MCV 86 fL      MCH 27 4 pg      MCHC 31 8 g/dL      RDW 16 2 %      MPV 7 5 fL      Platelets 043 Thousands/uL      Neutrophils Relative 61 %      Lymphocytes Relative 25 %      Monocytes Relative 12 %      Eosinophils Relative 1 %      Basophils Relative 0 %      Neutrophils Absolute 3 40 Thousands/µL      Lymphocytes Absolute 1 40 Thousands/µL      Monocytes Absolute 0 70 Thousand/µL      Eosinophils Absolute 0 10 Thousand/µL      Basophils Absolute 0 00 Thousands/µL                  No orders to display              Procedures  Procedures       ED Course       EXAMINATION AND EVALUATION  THE PATIENT WAS RECOMMENDED TO COME IN BY HER PHYSICIAN BECAUSE OF THE PERSISTENT DIARRHEA  ALTHOUGH LABORATORY STUDIES ARE UNREMARKABLE, SHE HAD 6 LOOSE BMS ALTHOUGH SMALL IN QUANTITY WHILE HERE IN THE ED    BECAUSE OF THE PERSISTENT DIARRHEA AND HER ADVANCED AGE, SHE WILL BE ADMITTED FOR GENTLE HYDRATION GIVEN HER CHF,  AND INITIATION OF FIBER BINDING AGENTS  SUCH AS QUESTRAN OR METAMUCIL  I WILL DEFER ANY ADDITIONAL IMAGING TO HER ADMITTING PHYSICIAN  MDM    Disposition  Final diagnoses:   None     ED Disposition     ED Disposition Condition Date/Time Comment    Admit Stable Fri Nov 1, 2019 10:05 PM Case was discussed with NUBIA and the patient's admission status was agreed to be Admission Status: observation status to the service of Dr Estella Wilkerson   Follow-up Information    None         Patient's Medications   Discharge Prescriptions    No medications on file     No discharge procedures on file      ED Provider  Electronically Signed by           Jori Sanchez DO  11/01/19 0151

## 2019-11-02 NOTE — CONSULTS
Consultation - Danuta Brown 82 y o  female MRN: [id number]  Unit/Bed#: -01 Encounter: [id number]      Assessment/Plan     Assessment/Plan:  1  Diarrhea--?medication induced vs microscopic colitis  Stool studies are pending but C  Diff is negative  Would bulk up her stool with metamucil or Benefiber  She's had a recent colonoscopy with Dr Román Beasley showing colonic polyps but pathology is unknown  She may benefit from a repeat colonoscopy with biopsies if no improvement in diarrhea with stool bulking agents as OP  High fiber diet is encouraged  Also checking TSH level and celiac panel  2  Anemia--unclear etiology  Likely nutritional vs myelodysplastic syndrome vs less likely due to GI bleed  She denies any signs of GI bleed  Monitor H/H  Thank you for the consultation  History of Present Illness   Physician Requesting Consult: Anabell Zapien MD  Reason for Consult / Principal Problem: diarrhea  Hx and PE limited by:   HPI: Meet Douglas is a 82y o  year old female who presents with chronic diarrhea  She reports multiple episodes a day of mostly watery diarrhea for almost two months  Prior to that she was having normal bowel movements  She reports normal stool studies as OP done by her PMD  Has normal appetite and no abdominal pain  Has had a colonoscopy earlier this year with Dr Román Beasley and was told she had some polyps  Inpatient consult to gastroenterology  Consult performed by:  Nilsa Goldman MD  Consult ordered by: Anabell Zapien MD          Review of Systems    Historical Information   Past Medical History:   Diagnosis Date    Asthma     Atrial fibrillation     Blurred vision     Cardiac disease     Colitis     COPD (chronic obstructive pulmonary disease)     Diverticulosis     DJD (degenerative joint disease)     Emphysema lung     Gait abnormality      Past Surgical History:   Procedure Laterality Date    BLADDER SURGERY      COLON SURGERY      COLONOSCOPY W/ POLYPECTOMY N/A 2019    Procedure: COLONOSCOPY W/ POLYPECTOMY;  Surgeon: Beka Ashley MD;  Location: [address] [place] [address] GI LAB; Service: General    SMALL INTESTINE SURGERY       Social History   Social History     Substance and Sexual Activity   Alcohol Use Not Currently    Frequency: Never    Binge frequency: Never     Social History     Substance and Sexual Activity   Drug Use No     Social History     Tobacco Use   Smoking Status Former Smoker    Last attempt to quit: 2013    Years since quittin 9   Smokeless Tobacco Never Used     Family History: non-contributory    Meds/Allergies   all current active meds have been reviewed    Allergies   Allergen Reactions    Fluticasone        Objective       Intake/Output Summary (Last 24 hours) at 2019 1124  Last data filed at 2019 1568  Gross per 24 hour   Intake 1000 ml   Output 1700 ml   Net -700 ml       Invasive Devices:   Peripheral IV 19 Right Forearm (Active)   Site Assessment Clean;Dry; Intact 2019 10:00 AM   Dressing Type Transparent 2019 10:00 AM   Line Status Blood return noted; Infusing 2019 10:00 AM   Dressing Status Clean;Dry; Intact 2019 10:00 AM   Reason Not Rotated Not due 2019 10:00 AM       Physical Exam  Vitals:    19 0839   BP: 116/68   Pulse: 80   Resp:    Temp:    SpO2:      Lab Results: I have personally reviewed pertinent reports  Imaging Studies: No abdominal imaging available on this admission    Counseling / Coordination of Care  Total floor / unit time spent today 40 minutes  Greater than 50% of total time was spent with the patient and / or family counseling and / or coordination of care  A description of the counseling / coordination of care: Would recommend adding stool bulking agents and high fiber diet  If no improvement, would consider colonoscopy as OP

## 2019-11-02 NOTE — PLAN OF CARE
Problem: GASTROINTESTINAL - ADULT  Goal: Minimal or absence of nausea and/or vomiting  Description  INTERVENTIONS:  - Administer IV fluids if ordered to ensure adequate hydration  - Maintain NPO status until nausea and vomiting are resolved  - Nasogastric tube if ordered  - Administer ordered antiemetic medications as needed  - Provide nonpharmacologic comfort measures as appropriate  - Advance diet as tolerated, if ordered  - Consider nutrition services referral to assist patient with adequate nutrition and appropriate food choices  Outcome: Progressing  Goal: Maintains or returns to baseline bowel function  Description  INTERVENTIONS:  - Assess bowel function  - Encourage oral fluids to ensure adequate hydration  - Administer IV fluids if ordered to ensure adequate hydration  - Administer ordered medications as needed  - Encourage mobilization and activity  - Consider nutritional services referral to assist patient with adequate nutrition and appropriate food choices  Outcome: Progressing  Goal: Maintains adequate nutritional intake  Description  INTERVENTIONS:  - Monitor percentage of each meal consumed  - Identify factors contributing to decreased intake, treat as appropriate  - Assist with meals as needed  - Monitor I&O, weight, and lab values if indicated  - Obtain nutrition services referral as needed  Outcome: Progressing     Problem: SKIN/TISSUE INTEGRITY - ADULT  Goal: Skin integrity remains intact  Description  INTERVENTIONS  - Identify patients at risk for skin breakdown  - Assess and monitor skin integrity  - Assess and monitor nutrition and hydration status  - Monitor labs (i e  albumin)  - Assess for incontinence   - Turn and reposition patient  - Assist with mobility/ambulation  - Relieve pressure over bony prominences  - Avoid friction and shearing  - Provide appropriate hygiene as needed including keeping skin clean and dry  - Evaluate need for skin moisturizer/barrier cream  - Collaborate with interdisciplinary team (i e  Nutrition, Rehabilitation, etc )   - Patient/family teaching  Outcome: Progressing

## 2019-11-02 NOTE — H&P
Shwetha,#  SUN:[birth year] F  ASP:[id number]    [facility]:[id number]  Adm Date: 11/1/2019 1956  7:56 PM   ATT PHY: Aniya Trammell, [address]         Chief Complaint:  Worsening diarrhea with dehydration    History of Presenting Illness: Adonay Chatman is a(n) 82y o  year old female who was admitted to the emergency department where she presented due to multiple episodes of diarrhea despite of taking Imodium to the maximum wound for 24 hours  Patient reported that yesterday she had 25 episodes of diarrhea  Patient was admitted to telemetry floor for further workup and fluid resuscitation  On examination at bedside, patient appears to be looking better  Patient reports that she does feel improved since yesterday  Today patient had 2 episodes of diarrhea after taking her lunch  Patient was earlier seen by Gastroenterology Dr Steve Stubbs who thinks that patient's diarrhea is most likely secondary to medications versus microscopic colitis  She has put the patient on stool bulking agent Metamucil  See has suggested outpatient based colonoscopy with biopsies if diarrhea does not improve with stool bulking agents  Celiac disease workup and bacterial panel is still pending  Patient's C diff test was found to be negative and patient has also negative fecal leukocytes  Allergies   Allergen Reactions    Fluticasone        No current facility-administered medications on file prior to encounter        Current Outpatient Medications on File Prior to Encounter   Medication Sig Dispense Refill    Albuterol Sulfate 108 (90 Base) MCG/ACT AEPB Inhale 2 puffs 3 (three) times a day as needed      alendronate (FOSAMAX) 70 mg tablet Take 70 mg by mouth every 7 days      ALPRAZolam (XANAX) 0 5 mg tablet Take 0 5 mg by mouth daily at bedtime as needed      apixaban (ELIQUIS) 5 mg Take 1 tablet (5 mg total) by mouth 2 (two) times a day 60 tablet 0    Calcium Carb-Cholecalciferol (CALCIUM 1000 + D PO) Take 1,000 mg by mouth daily      diltiazem (CARDIZEM CD) 240 mg 24 hr capsule Take 1 capsule (240 mg total) by mouth daily  0    famotidine (PEPCID) 40 MG tablet Take 40 mg by mouth as needed       fluticasone-umeclidinium-vilanterol (TRELEGY ELLIPTA) 100-62 5-25 MCG/INH inhaler 1 puff daily      furosemide (LASIX) 20 mg tablet Take 20 mg by mouth daily      ipratropium (ATROVENT) 0 02 % nebulizer solution Take 0 5 mg by nebulization 4 (four) times a day      ipratropium-albuterol (COMBIVENT RESPIMAT) inhaler Every 6 hours      loperamide (IMODIUM) 2 mg capsule Take 1 capsule (2 mg total) by mouth 3 (three) times a day as needed for diarrhea 30 capsule 0    metoprolol tartrate (LOPRESSOR) 25 mg tablet Take 1 tablet (25 mg total) by mouth every 12 (twelve) hours  0    montelukast (SINGULAIR) 10 mg tablet Take 10 mg by mouth daily at bedtime      potassium chloride (MICRO-K) 10 MEQ CR capsule Take 10 mEq by mouth daily         Active Ambulatory Problems     Diagnosis Date Noted    [facility] 12/28/2018    Cardiac disease 12/28/2018    Diverticulosis of intestine with bleeding 12/28/2018    Diarrhea 12/29/2018    Colorectal polyps 01/21/2019    Effusion of bursa of left elbow 09/23/2019    Cardiomegaly 01/23/2018    Chronic anxiety 01/23/2018    Chronic cystitis 12/11/2013    Chronic low back pain 08/13/2018    Congestive heart failure (HCC) 01/23/2018    Deviated nasal septum 10/20/2017    Diverticulosis of colon 01/21/2019    Gastroesophageal reflux disease without esophagitis 03/28/2018    Gout 07/20/2016    History of angioedema 11/18/2017    History of colonic polyps 01/21/2019    Lumbar radiculopathy 08/13/2018    Macular degeneration of both eyes 04/18/2019    Obesity 02/28/2018    Osteoporosis 03/08/2018    Other specified forms of chronic ischemic heart disease 12/11/2013    Seasonal allergies 2018    Panic attack 2018    Vocal cord dysfunction 10/20/2017    Colitis presumed infectious 10/03/2019    Atrial fibrillation with rapid ventricular response [facility] ) 10/06/2019    Left elbow pain 10/06/2019    Chronic respiratory failure with hypoxia ([facility] ) 10/07/2019    Debility 10/11/2019    Positive culture findings in sputum 10/12/2019     Resolved Ambulatory Problems     Diagnosis Date Noted    Rectal bleed 2018    Asthma 2018    Herpes zoster 2012     Past Medical History:   Diagnosis Date    Atrial fibrillation     Blurred vision     Colitis     Diverticulosis     DJD (degenerative joint disease)     Emphysema lung     Gait abnormality        Past Surgical History:   Procedure Laterality Date    BLADDER SURGERY      COLON SURGERY      COLONOSCOPY W/ POLYPECTOMY N/A 2019    Procedure: COLONOSCOPY W/ POLYPECTOMY;  Surgeon: Marcelina Galan MD;  Location: [address] [place] [address] GI LAB; Service: General    SMALL INTESTINE SURGERY         Social History:   Social History     Socioeconomic History    Marital status:       Spouse name: None    Number of children: None    Years of education: None    Highest education level: None   Occupational History    None   Social Needs    Financial resource strain: None    Food insecurity:     Worry: None     Inability: None    Transportation needs:     Medical: None     Non-medical: None   Tobacco Use    Smoking status: Former Smoker     Last attempt to quit: 2013     Years since quittin 9    Smokeless tobacco: Never Used   Substance and Sexual Activity    Alcohol use: Not Currently     Frequency: Never     Binge frequency: Never    Drug use: No    Sexual activity: Not Currently   Lifestyle    Physical activity:     Days per week: None     Minutes per session: None    Stress: None   Relationships    Social connections:     Talks on phone: None     Gets together: None     Attends Oriental orthodox service: None Active member of club or organization: None     Attends meetings of clubs or organizations: None     Relationship status: None    Intimate partner violence:     Fear of current or ex partner: None     Emotionally abused: None     Physically abused: None     Forced sexual activity: None   Other Topics Concern    None   Social History Narrative    None       Family History:   Family History   Problem Relation Age of Onset    Heart disease Mother        Review of Systems   Gastrointestinal: Positive for diarrhea  Negative for nausea and vomiting  Musculoskeletal: Positive for arthralgias, back pain and gait problem  All other systems reviewed and are negative  Physical Exam   Constitutional: Awake and Alert  Well-developed and well-nourished  No distress  HENT: PERR,EOMI, conjunctiva normal  Head: Normocephalic and atraumatic  Mouth/Throat: Oropharynx is clear and moist     Eyes: Conjunctivae and EOM are normal  Pupils are equal, round, and reactive to light  Right and left eye exhibits no discharge  Neck: Neck supple  No tracheal deviation present  No thyromegaly present  Cardiovascular: Normal rate, regular rhythm and normal heart sounds  Exam reveals no friction rub  No murmur heard  Pulmonary/Chest: Effort normal and breath sounds normal  No respiratory distress  She has no wheezes  Abdominal: Soft  Bowel sounds are normal  She exhibits no distension  There is no tenderness  There is no rebound and no guarding  Neurological: Cranial Nerves grossly intact  No sensory deficit  Coordination normal    Musculoskeletal:   Nontender spine  Skin: Skin is warm and dry  No rash noted  No diaphoresis  No erythema  No edema  No cyanosis  Savannah Silva is a(n) 82y o  year old female with acute on chronic diarrhea     1  Acute diarrhea on chronic  Dr Enrico Galaviz GI is on consult and has already seen the patient    She thinks it is a possible medication induced versus microscopic colitis  She is recommending repeat colonoscopy with biopsies on outpatient basis if patient continues to have diarrhea despite of stool bulking agents  Patient has been put on Metamucil  Other tests studies including celiac workup and bacterial panel is still pending  Patient's workup including C diff test were negative, fecal leukocytes are negative  Patient is on lactose-free diet  2  Generalized weakness  I will consult PT OT for evaluation and treatment  3  Cardiac with history of hypertension, atrial fibrillation  Patient will be continued on Eliquis, diltiazem, Lasix metoprolol and potassium chloride  4  COPD  Patient is getting Breo Ellipta, Combivent, albuterol, ipratropium nebs and montelukast along with oxygen on as-needed basis via nasal cannula  5  Osteoporosis  Patient is on alendronate along with calcium and vitamin-D   6  DJD/osteoarthritis with the right thigh/leg pain  Patient has been put on Voltaren gel along with heating pad over the leg  Patient may get Tylenol on as needed basis  7  GERD  Patient is on Protonix        Prognosis: fair      Discharge Plan: in progress      Advanced Directives: I have discussed in detail the patient the advanced directives  Patient has appointed one of her close friend Lindy Kauffman as her POA and her phone number is [phone]  Patient also claims that she has living will with advanced directives  When discussing cardiac and pulmonary resuscitation efforts with the patient,wishes to be FULL CODE      I have spent more than 50 minutes gathering data, doing physical examination, and discussing the advanced directives, which was witnessed by caring staff

## 2019-11-02 NOTE — RESPIRATORY THERAPY NOTE
RT Protocol Note  Luis Cm 82 y o  female MRN: [id number]  Unit/Bed#: -01 Encounter: [id number]    Assessment    Active Problems:    * No active hospital problems  *      Home Pulmonary Medications:    Home Devices/Therapy: Other (Comment)(Albuterol MDI PRN/Duoneb Q6)    Past Medical History:   Diagnosis Date    Asthma     Atrial fibrillation     Blurred vision     Cardiac disease     Colitis     COPD (chronic obstructive pulmonary disease)     Diverticulosis     DJD (degenerative joint disease)     Emphysema lung     Gait abnormality      Social History     Socioeconomic History    Marital status:      Spouse name: None    Number of children: None    Years of education: None    Highest education level: None   Occupational History    None   Social Needs    Financial resource strain: None    Food insecurity:     Worry: None     Inability: None    Transportation needs:     Medical: None     Non-medical: None   Tobacco Use    Smoking status: Former Smoker     Last attempt to quit: 2013     Years since quittin 9    Smokeless tobacco: Never Used   Substance and Sexual Activity    Alcohol use: Not Currently     Frequency: Never     Binge frequency: Never    Drug use: No    Sexual activity: Not Currently   Lifestyle    Physical activity:     Days per week: None     Minutes per session: None    Stress: None   Relationships    Social connections:     Talks on phone: None     Gets together: None     Attends [employer] service: None     Active member of club or organization: None     Attends meetings of clubs or organizations: None     Relationship status: None    Intimate partner violence:     Fear of current or ex partner: None     Emotionally abused: None     Physically abused: None     Forced sexual activity: None   Other Topics Concern    None   Social History Narrative    None       Subjective Protocol modified,Duoneb Q6   Albuterol MDI  PRN/WHZ Objective    Physical Exam:   Assessment Type: Pre-treatment  General Appearance: Alert, Awake  Respiratory Pattern: Normal  Chest Assessment: Chest expansion symmetrical  Bilateral Breath Sounds: Clear, Diminished    Vitals:  Blood pressure 127/81, pulse 83, temperature (!) 97 1 °F (36 2 °C), temperature source Temporal, resp  rate 20, height 5' (1 524 m), weight 70 5 kg (155 lb 5 oz), SpO2 92 %, not currently breastfeeding  Imaging and other studies: I have personally reviewed pertinent reports              Plan    Respiratory Plan: Home Bronchodilator Patient pathway        Resp Comments: (P) HX:COPD

## 2019-11-03 LAB
ALBUMIN SERPL BCP-MCNC: 3.5 G/DL (ref 3.5–5.7)
ALP SERPL-CCNC: 98 U/L (ref 55–165)
ALT SERPL W P-5'-P-CCNC: 22 U/L (ref 7–52)
ANION GAP SERPL CALCULATED.3IONS-SCNC: 6 MMOL/L (ref 4–13)
AST SERPL W P-5'-P-CCNC: 13 U/L (ref 13–39)
BILIRUB SERPL-MCNC: 0.3 MG/DL (ref 0.2–1)
BNP SERPL-MCNC: 100 PG/ML (ref 1–100)
BUN SERPL-MCNC: 6 MG/DL (ref 7–25)
CALCIUM SERPL-MCNC: 9.1 MG/DL (ref 8.6–10.5)
CHLORIDE SERPL-SCNC: 103 MMOL/L (ref 98–107)
CO2 SERPL-SCNC: 27 MMOL/L (ref 21–31)
CREAT SERPL-MCNC: 0.52 MG/DL (ref 0.6–1.2)
GFR SERPL CREATININE-BSD FRML MDRD: 89 ML/MIN/1.73SQ M
GLUCOSE SERPL-MCNC: 117 MG/DL (ref 65–99)
POTASSIUM SERPL-SCNC: 3.3 MMOL/L (ref 3.5–5.5)
PROT SERPL-MCNC: 5.9 G/DL (ref 6.4–8.9)
SODIUM SERPL-SCNC: 136 MMOL/L (ref 134–143)

## 2019-11-03 PROCEDURE — 83880 ASSAY OF NATRIURETIC PEPTIDE: CPT | Performed by: FAMILY MEDICINE

## 2019-11-03 PROCEDURE — 94760 N-INVAS EAR/PLS OXIMETRY 1: CPT

## 2019-11-03 PROCEDURE — 94640 AIRWAY INHALATION TREATMENT: CPT

## 2019-11-03 PROCEDURE — 80053 COMPREHEN METABOLIC PANEL: CPT | Performed by: FAMILY MEDICINE

## 2019-11-03 RX ORDER — FUROSEMIDE 20 MG/1
20 TABLET ORAL DAILY
Status: DISCONTINUED | OUTPATIENT
Start: 2019-11-04 | End: 2019-11-04 | Stop reason: HOSPADM

## 2019-11-03 RX ORDER — POTASSIUM CHLORIDE 20 MEQ/1
20 TABLET, EXTENDED RELEASE ORAL DAILY
Status: DISCONTINUED | OUTPATIENT
Start: 2019-11-04 | End: 2019-11-03

## 2019-11-03 RX ORDER — POTASSIUM CHLORIDE 20 MEQ/1
20 TABLET, EXTENDED RELEASE ORAL DAILY
Status: DISCONTINUED | OUTPATIENT
Start: 2019-11-03 | End: 2019-11-04 | Stop reason: HOSPADM

## 2019-11-03 RX ORDER — FUROSEMIDE 10 MG/ML
40 INJECTION INTRAMUSCULAR; INTRAVENOUS ONCE
Status: COMPLETED | OUTPATIENT
Start: 2019-11-03 | End: 2019-11-03

## 2019-11-03 RX ADMIN — PSYLLIUM HUSK 1 PACKET: 3.4 POWDER ORAL at 20:36

## 2019-11-03 RX ADMIN — ONDANSETRON 4 MG: 2 INJECTION INTRAMUSCULAR; INTRAVENOUS at 20:36

## 2019-11-03 RX ADMIN — PANTOPRAZOLE SODIUM 40 MG: 40 TABLET, DELAYED RELEASE ORAL at 06:02

## 2019-11-03 RX ADMIN — IPRATROPIUM BROMIDE AND ALBUTEROL SULFATE 3 ML: 2.5; .5 SOLUTION RESPIRATORY (INHALATION) at 12:41

## 2019-11-03 RX ADMIN — PSYLLIUM HUSK 1 PACKET: 3.4 POWDER ORAL at 09:50

## 2019-11-03 RX ADMIN — PSYLLIUM HUSK 1 PACKET: 3.4 POWDER ORAL at 17:30

## 2019-11-03 RX ADMIN — ACETAMINOPHEN 650 MG: 325 TABLET ORAL at 19:22

## 2019-11-03 RX ADMIN — BUDESONIDE AND FORMOTEROL FUMARATE DIHYDRATE 2 PUFF: 80; 4.5 AEROSOL RESPIRATORY (INHALATION) at 17:31

## 2019-11-03 RX ADMIN — ALPRAZOLAM 0.5 MG: 0.5 TABLET ORAL at 23:20

## 2019-11-03 RX ADMIN — CALCIUM CARBONATE-VITAMIN D TAB 500 MG-200 UNIT 1 TABLET: 500-200 TAB at 17:31

## 2019-11-03 RX ADMIN — METOPROLOL TARTRATE 25 MG: 25 TABLET, FILM COATED ORAL at 20:36

## 2019-11-03 RX ADMIN — DICLOFENAC 2 G: 10 GEL TOPICAL at 17:31

## 2019-11-03 RX ADMIN — POTASSIUM CHLORIDE AND SODIUM CHLORIDE 150 ML/HR: 900; 150 INJECTION, SOLUTION INTRAVENOUS at 03:44

## 2019-11-03 RX ADMIN — BUDESONIDE AND FORMOTEROL FUMARATE DIHYDRATE 2 PUFF: 80; 4.5 AEROSOL RESPIRATORY (INHALATION) at 09:50

## 2019-11-03 RX ADMIN — APIXABAN 5 MG: 5 TABLET, FILM COATED ORAL at 09:50

## 2019-11-03 RX ADMIN — DICLOFENAC 2 G: 10 GEL TOPICAL at 09:50

## 2019-11-03 RX ADMIN — IPRATROPIUM BROMIDE AND ALBUTEROL SULFATE 3 ML: 2.5; .5 SOLUTION RESPIRATORY (INHALATION) at 07:15

## 2019-11-03 RX ADMIN — CALCIUM CARBONATE-VITAMIN D TAB 500 MG-200 UNIT 1 TABLET: 500-200 TAB at 09:50

## 2019-11-03 RX ADMIN — METOPROLOL TARTRATE 25 MG: 25 TABLET, FILM COATED ORAL at 09:50

## 2019-11-03 RX ADMIN — ACETAMINOPHEN 650 MG: 325 TABLET ORAL at 11:13

## 2019-11-03 RX ADMIN — POTASSIUM CHLORIDE 20 MEQ: 1500 TABLET, EXTENDED RELEASE ORAL at 17:31

## 2019-11-03 RX ADMIN — IPRATROPIUM BROMIDE AND ALBUTEROL SULFATE 3 ML: 2.5; .5 SOLUTION RESPIRATORY (INHALATION) at 19:30

## 2019-11-03 RX ADMIN — DICLOFENAC 2 G: 10 GEL TOPICAL at 12:27

## 2019-11-03 RX ADMIN — APIXABAN 5 MG: 5 TABLET, FILM COATED ORAL at 17:31

## 2019-11-03 RX ADMIN — FUROSEMIDE 40 MG: 10 INJECTION, SOLUTION INTRAMUSCULAR; INTRAVENOUS at 10:27

## 2019-11-03 RX ADMIN — DILTIAZEM HYDROCHLORIDE 240 MG: 240 CAPSULE, COATED, EXTENDED RELEASE ORAL at 09:50

## 2019-11-03 RX ADMIN — DICLOFENAC 2 G: 10 GEL TOPICAL at 21:20

## 2019-11-03 RX ADMIN — MONTELUKAST SODIUM 10 MG: 10 TABLET, COATED ORAL at 21:21

## 2019-11-03 NOTE — PROGRESS NOTES
Progress Note - Robert Mortensen 82 y o  female MRN: [id number]    Unit/Bed#: -01 Encounter: [id number]        Subjective:   Patient seen and examined at bedside after reviewing the chart and discussing the case with the caring staff  Earlier this morning it was reported that patient was found to be dyspneic  Patient's weight on admission was found to be 69 9 kg but this morning patient's weight was 72 2 kg  Patient was receiving IV fluids normal saline with potassium chloride at 150 cc an hour  Patient's fluid was stopped and patient received Lasix 40 mg IV x1  At the time of examination in the evening patient's weight was back to 69 9 kg  On examination at bedside patient reports that she does not have any difficulty breathing any more  Patient denied any episode of diarrhea today  Patient's blood pressure though is found to be on the lower side 96/53 with heart rate of 76  I was planning to discharge the patient today but in view of her low blood pressure I will keep the patient overnight  Patient's labs today showed evidence of hypokalemia with potassium level of 3 3  Patient's BNP was found to be 100  Physical Exam   Vitals: Blood pressure 96/53, pulse 76, temperature (!) 97 3 °F (36 3 °C), temperature source Tympanic, resp  rate 20, height 5' (1 524 m), weight 69 9 kg (154 lb 1 6 oz), SpO2 97 %, not currently breastfeeding  ,Body mass index is 30 1 kg/m²  Constitutional: He appears well-developed  HEENT: PERR, EOMI, MMM  Cardiovascular: Normal rate and regular rhythm  Pulmonary/Chest: Effort normal and breath sounds normal    Abdomen: Soft, + BS, NT    Assessment/Plan:  Robert Mortensen is a(n) 82y o  year old female with acute on chronic diarrhea     1  Acute diarrhea on chronic   Patient's diarrhea seems to have resolved today  Dr Donald Mathews GI is on consult and has already seen the patient    She thinks it is a possible medication induced versus microscopic colitis  She is recommending repeat colonoscopy with biopsies on outpatient basis if patient continues to have diarrhea despite of stool bulking agents  Patient has been put on Metamucil  Other tests studies including celiac workup and bacterial panel is still pending  Patient's workup including C diff test were negative, fecal leukocytes are negative  Patient is on lactose-free diet  2  Generalized weakness  I will consult PT OT for evaluation and treatment  3  Cardiac with history of hypertension, atrial fibrillation   Patient will be continued on Eliquis, diltiazem, metoprolol  I will restart patient's Lasix 20 mg daily along with and potassium chloride 20 mEq daily  Patient received an extra dose of Lasix 40 mg IV x1 this morning due to mild shortness of breath with IV fluids which have been discontinued now  4  COPD   Patient is getting Breo Ellipta, Combivent, albuterol, ipratropium nebs and montelukast along with oxygen on as-needed basis via nasal cannula  5  Osteoporosis   Patient is on alendronate along with calcium and vitamin-D   6  DJD/osteoarthritis with the right thigh/leg pain  Patient has been put on Voltaren gel along with heating pad over the leg   Patient may get Tylenol on as needed basis  7  GERD   Patient is on Protonix     8  Hypokalemia  I will put the patient on potassium chloride 20 mEq daily

## 2019-11-03 NOTE — PLAN OF CARE
Problem: METABOLIC, FLUID AND ELECTROLYTES - ADULT  Goal: Electrolytes maintained within normal limits  Description  INTERVENTIONS:  - Monitor labs and assess patient for signs and symptoms of electrolyte imbalances  - Administer electrolyte replacement as ordered  - Monitor response to electrolyte replacements, including repeat lab results as appropriate  - Instruct patient on fluid and nutrition as appropriate  11/2/2019 2201 by Checo Alexander RN  Outcome: Progressing  11/2/2019 2056 by Checo Alexander RN  Outcome: Progressing     Problem: SKIN/TISSUE INTEGRITY - ADULT  Goal: Skin integrity remains intact  Description  INTERVENTIONS  - Identify patients at risk for skin breakdown  - Assess and monitor skin integrity  - Assess and monitor nutrition and hydration status  - Monitor labs (i e  albumin)  - Assess for incontinence   - Turn and reposition patient  - Assist with mobility/ambulation  - Relieve pressure over bony prominences  - Avoid friction and shearing  - Provide appropriate hygiene as needed including keeping skin clean and dry  - Evaluate need for skin moisturizer/barrier cream  - Collaborate with interdisciplinary team (i e  Nutrition, Rehabilitation, etc )   - Patient/family teaching  11/2/2019 2201 by Checo Alexander RN  Outcome: Progressing  11/2/2019 2056 by Checo Alexander RN  Outcome: Progressing

## 2019-11-03 NOTE — PLAN OF CARE
Problem: GASTROINTESTINAL - ADULT  Goal: Minimal or absence of nausea and/or vomiting  Description  INTERVENTIONS:  - Administer IV fluids if ordered to ensure adequate hydration  - Maintain NPO status until nausea and vomiting are resolved  - Nasogastric tube if ordered  - Administer ordered antiemetic medications as needed  - Provide nonpharmacologic comfort measures as appropriate  - Advance diet as tolerated, if ordered  - Consider nutrition services referral to assist patient with adequate nutrition and appropriate food choices  Outcome: Progressing  Goal: Maintains or returns to baseline bowel function  Description  INTERVENTIONS:  - Assess bowel function  - Encourage oral fluids to ensure adequate hydration  - Administer IV fluids if ordered to ensure adequate hydration  - Administer ordered medications as needed  - Encourage mobilization and activity  - Consider nutritional services referral to assist patient with adequate nutrition and appropriate food choices  Outcome: Progressing  Goal: Maintains adequate nutritional intake  Description  INTERVENTIONS:  - Monitor percentage of each meal consumed  - Identify factors contributing to decreased intake, treat as appropriate  - Assist with meals as needed  - Monitor I&O, weight, and lab values if indicated  - Obtain nutrition services referral as needed  Outcome: Progressing     Problem: METABOLIC, FLUID AND ELECTROLYTES - ADULT  Goal: Electrolytes maintained within normal limits  Description  INTERVENTIONS:  - Monitor labs and assess patient for signs and symptoms of electrolyte imbalances  - Administer electrolyte replacement as ordered  - Monitor response to electrolyte replacements, including repeat lab results as appropriate  - Instruct patient on fluid and nutrition as appropriate  Outcome: Progressing     Problem: SKIN/TISSUE INTEGRITY - ADULT  Goal: Skin integrity remains intact  Description  INTERVENTIONS  - Identify patients at risk for skin breakdown  - Assess and monitor skin integrity  - Assess and monitor nutrition and hydration status  - Monitor labs (i e  albumin)  - Assess for incontinence   - Turn and reposition patient  - Assist with mobility/ambulation  - Relieve pressure over bony prominences  - Avoid friction and shearing  - Provide appropriate hygiene as needed including keeping skin clean and dry  - Evaluate need for skin moisturizer/barrier cream  - Collaborate with interdisciplinary team (i e  Nutrition, Rehabilitation, etc )   - Patient/family teaching  Outcome: Progressing     Problem: PAIN - ADULT  Goal: Verbalizes/displays adequate comfort level or baseline comfort level  Description  Interventions:  - Encourage patient to monitor pain and request assistance  - Assess pain using appropriate pain scale  - Administer analgesics based on type and severity of pain and evaluate response  - Implement non-pharmacological measures as appropriate and evaluate response  - Consider cultural and social influences on pain and pain management  - Notify physician/advanced practitioner if interventions unsuccessful or patient reports new pain  Outcome: Progressing     Problem: INFECTION - ADULT  Goal: Absence or prevention of progression during hospitalization  Description  INTERVENTIONS:  - Assess and monitor for signs and symptoms of infection  - Monitor lab/diagnostic results  - Monitor all insertion sites, i e  indwelling lines, tubes, and drains  - Monitor endotracheal if appropriate and nasal secretions for changes in amount and color  - [place] appropriate cooling/warming therapies per order  - Administer medications as ordered  - Instruct and encourage patient and family to use good hand hygiene technique  - Identify and instruct in appropriate isolation precautions for identified infection/condition  Outcome: Progressing     Problem: SAFETY ADULT  Goal: Patient will remain free of falls  Description  INTERVENTIONS:  - Assess patient frequently for physical needs  -  Identify cognitive and physical deficits and behaviors that affect risk of falls    -  [place] fall precautions as indicated by assessment   - Educate patient/family on patient safety including physical limitations  - Instruct patient to call for assistance with activity based on assessment  - Modify environment to reduce risk of injury  - Consider OT/PT consult to assist with strengthening/mobility  Outcome: Progressing  Goal: Maintain or return to baseline ADL function  Description  INTERVENTIONS:  -  Assess patient's ability to carry out ADLs; assess patient's baseline for ADL function and identify physical deficits which impact ability to perform ADLs (bathing, care of mouth/teeth, toileting, grooming, dressing, etc )  - Assess/evaluate cause of self-care deficits   - Assess range of motion  - Assess patient's mobility; develop plan if impaired  - Assess patient's need for assistive devices and provide as appropriate  - Encourage maximum independence but intervene and supervise when necessary  - Involve family in performance of ADLs  - Assess for home care needs following discharge   - Consider OT consult to assist with ADL evaluation and planning for discharge  - Provide patient education as appropriate  Outcome: Progressing  Goal: Maintain or return mobility status to optimal level  Description  INTERVENTIONS:  - Assess patient's baseline mobility status (ambulation, transfers, stairs, etc )    - Identify cognitive and physical deficits and behaviors that affect mobility  - Identify mobility aids required to assist with transfers and/or ambulation (gait belt, sit-to-stand, lift, walker, cane, etc )  - [place] fall precautions as indicated by assessment  - Record patient progress and toleration of activity level on Mobility SBAR; progress patient to next Phase/Stage  - Instruct patient to call for assistance with activity based on assessment  - Consider rehabilitation consult to assist with strengthening/weightbearing, etc   Outcome: Progressing     Problem: DISCHARGE PLANNING  Goal: Discharge to home or other facility with appropriate resources  Description  INTERVENTIONS:  - Identify barriers to discharge w/patient and caregiver  - Arrange for needed discharge resources and transportation as appropriate  - Identify discharge learning needs (meds, wound care, etc )  - Arrange for interpretive services to assist at discharge as needed  - Refer to Case Management Department for coordinating discharge planning if the patient needs post-hospital services based on physician/advanced practitioner order or complex needs related to functional status, cognitive ability, or social support system  Outcome: Progressing     Problem: Knowledge Deficit  Goal: Patient/family/caregiver demonstrates understanding of disease process, treatment plan, medications, and discharge instructions  Description  Complete learning assessment and assess knowledge base  Interventions:  - Provide teaching at level of understanding  - Provide teaching via preferred learning methods  Outcome: Progressing     Problem: Potential for Falls  Goal: Patient will remain free of falls  Description  INTERVENTIONS:  - Assess patient frequently for physical needs  -  Identify cognitive and physical deficits and behaviors that affect risk of falls    -  [place] fall precautions as indicated by assessment   - Educate patient/family on patient safety including physical limitations  - Instruct patient to call for assistance with activity based on assessment  - Modify environment to reduce risk of injury  - Consider OT/PT consult to assist with strengthening/mobility  Outcome: Progressing     Problem: DISCHARGE PLANNING - CARE MANAGEMENT  Goal: Discharge to post-acute care or home with appropriate resources  Description  INTERVENTIONS:  - Conduct assessment to determine patient/family and health care team treatment goals, and need for post-acute services based on payer coverage, community resources, and patient preferences, and barriers to discharge  - Address psychosocial, clinical, and financial barriers to discharge as identified in assessment in conjunction with the patient/family and health care team  - Arrange appropriate level of post-acute services according to patient's   needs and preference and payer coverage in collaboration with the physician and health care team  - Communicate with and update the patient/family, physician, and health care team regarding progress on the discharge plan  - Arrange appropriate transportation to post-acute venues  Discharge to home  Pt refuses all services  Friend will transport     Outcome: Progressing

## 2019-11-03 NOTE — PLAN OF CARE
Problem: METABOLIC, FLUID AND ELECTROLYTES - ADULT  Goal: Electrolytes maintained within normal limits  Description  INTERVENTIONS:  - Monitor labs and assess patient for signs and symptoms of electrolyte imbalances  - Administer electrolyte replacement as ordered  - Monitor response to electrolyte replacements, including repeat lab results as appropriate  - Instruct patient on fluid and nutrition as appropriate  Outcome: Progressing     Problem: PAIN - ADULT  Goal: Verbalizes/displays adequate comfort level or baseline comfort level  Description  Interventions:  - Encourage patient to monitor pain and request assistance  - Assess pain using appropriate pain scale  - Administer analgesics based on type and severity of pain and evaluate response  - Implement non-pharmacological measures as appropriate and evaluate response  - Consider cultural and social influences on pain and pain management  - Notify physician/advanced practitioner if interventions unsuccessful or patient reports new pain  Outcome: Progressing

## 2019-11-03 NOTE — NURSING NOTE
Dr Monica Wilburn made aware earlier in the day of pt c/o being echeverria with fine crackles noted in the bases with fluids infusing, orders received, fluids d/c'd and lasix given with + results, pt presently sitting oob in her chair in no acute distress, lungs now clear, dr arriola was in to see and assess the pt, orders noted, callbell in reach of the pt

## 2019-11-04 VITALS
OXYGEN SATURATION: 97 % | TEMPERATURE: 97.9 F | SYSTOLIC BLOOD PRESSURE: 109 MMHG | RESPIRATION RATE: 18 BRPM | HEART RATE: 95 BPM | DIASTOLIC BLOOD PRESSURE: 57 MMHG | BODY MASS INDEX: 30.04 KG/M2 | HEIGHT: 60 IN | WEIGHT: 153 LBS

## 2019-11-04 LAB — GLUCOSE SERPL-MCNC: 119 MG/DL (ref 65–140)

## 2019-11-04 PROCEDURE — 94760 N-INVAS EAR/PLS OXIMETRY 1: CPT

## 2019-11-04 PROCEDURE — 82948 REAGENT STRIP/BLOOD GLUCOSE: CPT

## 2019-11-04 PROCEDURE — 94640 AIRWAY INHALATION TREATMENT: CPT

## 2019-11-04 RX ORDER — FUROSEMIDE 20 MG/1
20 TABLET ORAL DAILY
Qty: 30 TABLET | Refills: 1 | Status: SHIPPED | OUTPATIENT
Start: 2019-11-04 | End: 2019-11-12 | Stop reason: HOSPADM

## 2019-11-04 RX ORDER — POTASSIUM CHLORIDE 750 MG/1
10 CAPSULE, EXTENDED RELEASE ORAL DAILY
Qty: 30 CAPSULE | Refills: 1 | Status: ON HOLD | OUTPATIENT
Start: 2019-11-04 | End: 2019-11-12 | Stop reason: SDUPTHER

## 2019-11-04 RX ORDER — FAMOTIDINE 40 MG/1
40 TABLET, FILM COATED ORAL
Qty: 30 TABLET | Refills: 1 | Status: SHIPPED | OUTPATIENT
Start: 2019-11-04 | End: 2019-11-12 | Stop reason: HOSPADM

## 2019-11-04 RX ORDER — ALENDRONATE SODIUM 70 MG/1
70 TABLET ORAL
Qty: 4 TABLET | Refills: 1 | Status: SHIPPED | OUTPATIENT
Start: 2019-11-04 | End: 2019-11-12 | Stop reason: HOSPADM

## 2019-11-04 RX ORDER — DILTIAZEM HYDROCHLORIDE 240 MG/1
240 CAPSULE, COATED, EXTENDED RELEASE ORAL DAILY
Qty: 30 CAPSULE | Refills: 1 | Status: SHIPPED | OUTPATIENT
Start: 2019-11-04 | End: 2019-11-12 | Stop reason: HOSPADM

## 2019-11-04 RX ORDER — MONTELUKAST SODIUM 10 MG/1
10 TABLET ORAL
Qty: 30 TABLET | Refills: 1 | Status: ON HOLD | OUTPATIENT
Start: 2019-11-04 | End: 2019-11-12 | Stop reason: SDUPTHER

## 2019-11-04 RX ORDER — LOPERAMIDE HYDROCHLORIDE 2 MG/1
2 CAPSULE ORAL 3 TIMES DAILY PRN
Qty: 30 CAPSULE | Refills: 1 | Status: ON HOLD | OUTPATIENT
Start: 2019-11-04 | End: 2019-11-12 | Stop reason: SDUPTHER

## 2019-11-04 RX ADMIN — POTASSIUM CHLORIDE 20 MEQ: 1500 TABLET, EXTENDED RELEASE ORAL at 08:17

## 2019-11-04 RX ADMIN — IPRATROPIUM BROMIDE AND ALBUTEROL SULFATE 3 ML: 2.5; .5 SOLUTION RESPIRATORY (INHALATION) at 11:33

## 2019-11-04 RX ADMIN — IPRATROPIUM BROMIDE AND ALBUTEROL SULFATE 3 ML: 2.5; .5 SOLUTION RESPIRATORY (INHALATION) at 07:04

## 2019-11-04 RX ADMIN — PSYLLIUM HUSK 1 PACKET: 3.4 POWDER ORAL at 08:16

## 2019-11-04 RX ADMIN — PANTOPRAZOLE SODIUM 40 MG: 40 TABLET, DELAYED RELEASE ORAL at 05:17

## 2019-11-04 RX ADMIN — CALCIUM CARBONATE-VITAMIN D TAB 500 MG-200 UNIT 1 TABLET: 500-200 TAB at 08:17

## 2019-11-04 RX ADMIN — APIXABAN 5 MG: 5 TABLET, FILM COATED ORAL at 08:18

## 2019-11-04 RX ADMIN — BUDESONIDE AND FORMOTEROL FUMARATE DIHYDRATE 2 PUFF: 80; 4.5 AEROSOL RESPIRATORY (INHALATION) at 08:16

## 2019-11-04 RX ADMIN — DICLOFENAC 2 G: 10 GEL TOPICAL at 08:16

## 2019-11-04 NOTE — PLAN OF CARE
Problem: GASTROINTESTINAL - ADULT  Goal: Minimal or absence of nausea and/or vomiting  Description  INTERVENTIONS:  - Administer IV fluids if ordered to ensure adequate hydration  - Maintain NPO status until nausea and vomiting are resolved  - Nasogastric tube if ordered  - Administer ordered antiemetic medications as needed  - Provide nonpharmacologic comfort measures as appropriate  - Advance diet as tolerated, if ordered  - Consider nutrition services referral to assist patient with adequate nutrition and appropriate food choices  Outcome: Progressing  Goal: Maintains or returns to baseline bowel function  Description  INTERVENTIONS:  - Assess bowel function  - Encourage oral fluids to ensure adequate hydration  - Administer IV fluids if ordered to ensure adequate hydration  - Administer ordered medications as needed  - Encourage mobilization and activity  - Consider nutritional services referral to assist patient with adequate nutrition and appropriate food choices  Outcome: Progressing  Goal: Maintains adequate nutritional intake  Description  INTERVENTIONS:  - Monitor percentage of each meal consumed  - Identify factors contributing to decreased intake, treat as appropriate  - Assist with meals as needed  - Monitor I&O, weight, and lab values if indicated  - Obtain nutrition services referral as needed  Outcome: Progressing     Problem: METABOLIC, FLUID AND ELECTROLYTES - ADULT  Goal: Electrolytes maintained within normal limits  Description  INTERVENTIONS:  - Monitor labs and assess patient for signs and symptoms of electrolyte imbalances  - Administer electrolyte replacement as ordered  - Monitor response to electrolyte replacements, including repeat lab results as appropriate  - Instruct patient on fluid and nutrition as appropriate  Outcome: Progressing     Problem: SKIN/TISSUE INTEGRITY - ADULT  Goal: Skin integrity remains intact  Description  INTERVENTIONS  - Identify patients at risk for skin breakdown  - Assess and monitor skin integrity  - Assess and monitor nutrition and hydration status  - Monitor labs (i e  albumin)  - Assess for incontinence   - Turn and reposition patient  - Assist with mobility/ambulation  - Relieve pressure over bony prominences  - Avoid friction and shearing  - Provide appropriate hygiene as needed including keeping skin clean and dry  - Evaluate need for skin moisturizer/barrier cream  - Collaborate with interdisciplinary team (i e  Nutrition, Rehabilitation, etc )   - Patient/family teaching  Outcome: Progressing     Problem: PAIN - ADULT  Goal: Verbalizes/displays adequate comfort level or baseline comfort level  Description  Interventions:  - Encourage patient to monitor pain and request assistance  - Assess pain using appropriate pain scale  - Administer analgesics based on type and severity of pain and evaluate response  - Implement non-pharmacological measures as appropriate and evaluate response  - Consider cultural and social influences on pain and pain management  - Notify physician/advanced practitioner if interventions unsuccessful or patient reports new pain  Outcome: Progressing     Problem: INFECTION - ADULT  Goal: Absence or prevention of progression during hospitalization  Description  INTERVENTIONS:  - Assess and monitor for signs and symptoms of infection  - Monitor lab/diagnostic results  - Monitor all insertion sites, i e  indwelling lines, tubes, and drains  - Monitor endotracheal if appropriate and nasal secretions for changes in amount and color  - [place] appropriate cooling/warming therapies per order  - Administer medications as ordered  - Instruct and encourage patient and family to use good hand hygiene technique  - Identify and instruct in appropriate isolation precautions for identified infection/condition  Outcome: Progressing     Problem: SAFETY ADULT  Goal: Patient will remain free of falls  Description  INTERVENTIONS:  - Assess patient frequently for physical needs  -  Identify cognitive and physical deficits and behaviors that affect risk of falls    -  [place] fall precautions as indicated by assessment   - Educate patient/family on patient safety including physical limitations  - Instruct patient to call for assistance with activity based on assessment  - Modify environment to reduce risk of injury  - Consider OT/PT consult to assist with strengthening/mobility  Outcome: Progressing  Goal: Maintain or return to baseline ADL function  Description  INTERVENTIONS:  -  Assess patient's ability to carry out ADLs; assess patient's baseline for ADL function and identify physical deficits which impact ability to perform ADLs (bathing, care of mouth/teeth, toileting, grooming, dressing, etc )  - Assess/evaluate cause of self-care deficits   - Assess range of motion  - Assess patient's mobility; develop plan if impaired  - Assess patient's need for assistive devices and provide as appropriate  - Encourage maximum independence but intervene and supervise when necessary  - Involve family in performance of ADLs  - Assess for home care needs following discharge   - Consider OT consult to assist with ADL evaluation and planning for discharge  - Provide patient education as appropriate  Outcome: Progressing  Goal: Maintain or return mobility status to optimal level  Description  INTERVENTIONS:  - Assess patient's baseline mobility status (ambulation, transfers, stairs, etc )    - Identify cognitive and physical deficits and behaviors that affect mobility  - Identify mobility aids required to assist with transfers and/or ambulation (gait belt, sit-to-stand, lift, walker, cane, etc )  - [place] fall precautions as indicated by assessment  - Record patient progress and toleration of activity level on Mobility SBAR; progress patient to next Phase/Stage  - Instruct patient to call for assistance with activity based on assessment  - Consider rehabilitation consult to assist with strengthening/weightbearing, etc   Outcome: Progressing     Problem: DISCHARGE PLANNING  Goal: Discharge to home or other facility with appropriate resources  Description  INTERVENTIONS:  - Identify barriers to discharge w/patient and caregiver  - Arrange for needed discharge resources and transportation as appropriate  - Identify discharge learning needs (meds, wound care, etc )  - Arrange for interpretive services to assist at discharge as needed  - Refer to Case Management Department for coordinating discharge planning if the patient needs post-hospital services based on physician/advanced practitioner order or complex needs related to functional status, cognitive ability, or social support system  Outcome: Progressing     Problem: Knowledge Deficit  Goal: Patient/family/caregiver demonstrates understanding of disease process, treatment plan, medications, and discharge instructions  Description  Complete learning assessment and assess knowledge base  Interventions:  - Provide teaching at level of understanding  - Provide teaching via preferred learning methods  Outcome: Progressing     Problem: Potential for Falls  Goal: Patient will remain free of falls  Description  INTERVENTIONS:  - Assess patient frequently for physical needs  -  Identify cognitive and physical deficits and behaviors that affect risk of falls    -  [place] fall precautions as indicated by assessment   - Educate patient/family on patient safety including physical limitations  - Instruct patient to call for assistance with activity based on assessment  - Modify environment to reduce risk of injury  - Consider OT/PT consult to assist with strengthening/mobility  Outcome: Progressing     Problem: DISCHARGE PLANNING - CARE MANAGEMENT  Goal: Discharge to post-acute care or home with appropriate resources  Description  INTERVENTIONS:  - Conduct assessment to determine patient/family and health care team treatment goals, and need for post-acute services based on payer coverage, community resources, and patient preferences, and barriers to discharge  - Address psychosocial, clinical, and financial barriers to discharge as identified in assessment in conjunction with the patient/family and health care team  - Arrange appropriate level of post-acute services according to patient's   needs and preference and payer coverage in collaboration with the physician and health care team  - Communicate with and update the patient/family, physician, and health care team regarding progress on the discharge plan  - Arrange appropriate transportation to post-acute venues  Discharge to home  Pt refuses all services  Friend will transport     Outcome: Progressing

## 2019-11-04 NOTE — SOCIAL WORK
CM spoke with pt [address]  Per Kevin Bowser she is very concerned about pt returning home on discharge  Pt has had several admissions over the past 30 days  Pt reportedly has difficulty with caring for herself at home  She has difficulty toileting and making herself meals  She would favian tpt to return ot STR at [facility]  If she is agreeable  Pt was discharged form [address] does have POA but pt is alert, oriented and able to make her own decisiosn  CM met with pt to discuss recommendation of STR and concenred about her returning home  Pt became very agitated discussing same  She is very adamant she will not agree to return ot [facility] or any other SNF for STR on discharge  She is further adamant she be able to return to her own home on disicharge and wants to be discharged today  CM offered [facility]  Pt was initially not in agreement but did then eventually agree to [facility]  A post acute care recommendation was made by your care team for [facility]  Discussed [facility] with patient  List of agencies given to patient via in person  patient aware the list is custom filtered for them by preference  and that [facility] post acute providers are designated  Pt chose [facility]  Referral made and they are able to accept  Pt aware and in agreement  CM spoke with Kevin Bowser regarding pt refusal of STR  She voiced understading and will talk with pt more going forward  CM discussed case with Buck TORRES  Pt stable for discharge today  He is aware she agreed to [facility]  Pt aware of plan for discharge and reports a friend will transport her home

## 2019-11-04 NOTE — DISCHARGE SUMMARY
Discharge Summary - Robert Mortensen 82 y o  female MRN: [id number]    Unit/Bed#: -01 Encounter: [id number]    Admission Date:   Admission Orders (From admission, onward)     Ordered        11/02/19 1400  Inpatient Admission  Once         11/01/19 2206  Place in Observation  Once                     Admitting Diagnosis: Diarrhea [R19 7]    HPI/Summary of Hospital Course/Significant Findings: Patient is an 82year old female who was admitted to the emergency department due to multiple episodes of diarrhea despite of taking max dose/day of Immodium for 24 hours  Patient reported that she had 25 episodes of diarrhea the day prior to her admission  Patient was admitted to telemetry floor for further workup and management      Inpatient stay included IVF and Gastroenterology by Dr Juan R Yeung, who thinks that patient's diarrhea is most likely secondary to medications versus microscopic colitis  She placed the patient on stool bulking agent Metamucil, which resolved the patient's diarrhea episode  GI otherwise suggested outpatient based colonoscopy with biopsies if diarrhea does not improve with stool bulking agents  C  diff and stool enteric bacterial panel were otherwise negative  One complication during the patient's admission was her hypotension  She was as low as 92/51 on the day of discharge, but upon repeat check was 109/57  She admits to occasional lightheadedness and dizziness and has frequent hospital admissions  She uses a walker at baseline  For this reason, case management and the primary team had suggested SNF for rehab, but she adamantly declines this and instead wishes to be discharged home today (where she lives alone) with close outpatient follow up  The patient was initially resistant to [address], but after a more extensive discussion was accepting to this measure   Patient will be decreased on metoprolol from 25mg BID to 12 5mg BID, and was instructed to follow up within 10 days in Dr Ana Schmidt office for hospital follow up  Patient understands and agrees to the plan      Discharge Diagnosis: Diarrhea    Condition at Discharge: fair     Discharge instructions/Information to patient and family:   See after visit summary for information provided to patient and family  Provisions for Follow-Up Care:  See after visit summary for information related to follow-up care and any pertinent home health orders  PCP: Steve Curtis MD    Disposition: Home    Planned Readmission: No    Discharge Statement   I spent more than 30 minutes discharging the patient  This time was spent on the day of discharge  I had direct contact with the patient on the day of discharge  Discharge Medications:  See after visit summary for reconciled discharge medications provided to patient and family

## 2019-11-04 NOTE — NURSING NOTE
Pt discharged to home with visiting nurses  IV removed with catheter tip intact  Pressure and dressing applied until bleeding stopped  Dressing CDI  Discharge instructions discussed  Pt verbalized understanding of instructions  Pt given 10 written RXs  Belongings sent with pt  VSS  Pt escorted of unit via wheelchair by the PCA

## 2019-11-05 ENCOUNTER — HOSPITAL ENCOUNTER (INPATIENT)
Facility: HOSPITAL | Age: 84
LOS: 6 days | Discharge: HOME HEALTH | DRG: 641 | End: 2019-11-12
Attending: FAMILY MEDICINE | Admitting: FAMILY MEDICINE
Payer: MEDICARE

## 2019-11-05 DIAGNOSIS — J30.2 SEASONAL ALLERGIES: ICD-10-CM

## 2019-11-05 DIAGNOSIS — I51.7 CARDIOMEGALY: ICD-10-CM

## 2019-11-05 DIAGNOSIS — R53.81 DEBILITY: ICD-10-CM

## 2019-11-05 DIAGNOSIS — K59.1 FUNCTIONAL DIARRHEA: ICD-10-CM

## 2019-11-05 DIAGNOSIS — D50.9 IRON DEFICIENCY ANEMIA: ICD-10-CM

## 2019-11-05 DIAGNOSIS — E87.6 HYPOKALEMIA: ICD-10-CM

## 2019-11-05 DIAGNOSIS — K52.9 COLITIS: ICD-10-CM

## 2019-11-05 DIAGNOSIS — I48.91 ATRIAL FIBRILLATION WITH RAPID VENTRICULAR RESPONSE (HCC): ICD-10-CM

## 2019-11-05 DIAGNOSIS — K21.9 GERD (GASTROESOPHAGEAL REFLUX DISEASE): ICD-10-CM

## 2019-11-05 DIAGNOSIS — T82.7XXA: ICD-10-CM

## 2019-11-05 DIAGNOSIS — J44.9 COPD (CHRONIC OBSTRUCTIVE PULMONARY DISEASE) (HCC): Chronic | ICD-10-CM

## 2019-11-05 DIAGNOSIS — I50.9 CONGESTIVE HEART FAILURE (HCC): ICD-10-CM

## 2019-11-05 DIAGNOSIS — R19.7 DIARRHEA: ICD-10-CM

## 2019-11-05 DIAGNOSIS — I51.9 CARDIAC DISEASE: Chronic | ICD-10-CM

## 2019-11-05 DIAGNOSIS — Z22.322 MRSA (METHICILLIN RESISTANT STAPH AUREUS) CULTURE POSITIVE: ICD-10-CM

## 2019-11-05 DIAGNOSIS — R26.9 GAIT DISTURBANCE: ICD-10-CM

## 2019-11-05 DIAGNOSIS — R19.7 DIARRHEA, UNSPECIFIED TYPE: Primary | ICD-10-CM

## 2019-11-05 LAB
ALBUMIN SERPL BCP-MCNC: 3.6 G/DL (ref 3.5–5.7)
ALP SERPL-CCNC: 107 U/L (ref 55–165)
ALT SERPL W P-5'-P-CCNC: 21 U/L (ref 7–52)
ANION GAP SERPL CALCULATED.3IONS-SCNC: 7 MMOL/L (ref 4–13)
AST SERPL W P-5'-P-CCNC: 15 U/L (ref 13–39)
BASOPHILS # BLD AUTO: 0 THOUSANDS/ΜL (ref 0–0.1)
BASOPHILS NFR BLD AUTO: 0 % (ref 0–2)
BILIRUB SERPL-MCNC: 0.4 MG/DL (ref 0.2–1)
BUN SERPL-MCNC: 11 MG/DL (ref 7–25)
CALCIUM SERPL-MCNC: 9.1 MG/DL (ref 8.6–10.5)
CHLORIDE SERPL-SCNC: 101 MMOL/L (ref 98–107)
CO2 SERPL-SCNC: 30 MMOL/L (ref 21–31)
CREAT SERPL-MCNC: 0.6 MG/DL (ref 0.6–1.2)
EOSINOPHIL # BLD AUTO: 0.1 THOUSAND/ΜL (ref 0–0.61)
EOSINOPHIL NFR BLD AUTO: 2 % (ref 0–5)
ERYTHROCYTE [DISTWIDTH] IN BLOOD BY AUTOMATED COUNT: 16.4 % (ref 11.5–14.5)
GFR SERPL CREATININE-BSD FRML MDRD: 85 ML/MIN/1.73SQ M
GLUCOSE SERPL-MCNC: 115 MG/DL (ref 65–99)
HCT VFR BLD AUTO: 34.4 % (ref 42–47)
HGB BLD-MCNC: 10.9 G/DL (ref 12–16)
LYMPHOCYTES # BLD AUTO: 1.2 THOUSANDS/ΜL (ref 0.6–4.47)
LYMPHOCYTES NFR BLD AUTO: 22 % (ref 21–51)
MCH RBC QN AUTO: 26.8 PG (ref 26–34)
MCHC RBC AUTO-ENTMCNC: 31.7 G/DL (ref 31–37)
MCV RBC AUTO: 85 FL (ref 81–99)
MONOCYTES # BLD AUTO: 0.6 THOUSAND/ΜL (ref 0.17–1.22)
MONOCYTES NFR BLD AUTO: 10 % (ref 2–12)
NEUTROPHILS # BLD AUTO: 3.6 THOUSANDS/ΜL (ref 1.4–6.5)
NEUTS SEG NFR BLD AUTO: 66 % (ref 42–75)
PLATELET # BLD AUTO: 246 THOUSANDS/UL (ref 149–390)
PMV BLD AUTO: 7.6 FL (ref 8.6–11.7)
POTASSIUM SERPL-SCNC: 3.5 MMOL/L (ref 3.5–5.5)
PROT SERPL-MCNC: 6.1 G/DL (ref 6.4–8.9)
RBC # BLD AUTO: 4.07 MILLION/UL (ref 3.9–5.2)
SODIUM SERPL-SCNC: 138 MMOL/L (ref 134–143)
WBC # BLD AUTO: 5.5 THOUSAND/UL (ref 4.8–10.8)

## 2019-11-05 PROCEDURE — 36415 COLL VENOUS BLD VENIPUNCTURE: CPT | Performed by: FAMILY MEDICINE

## 2019-11-05 PROCEDURE — 86255 FLUORESCENT ANTIBODY SCREEN: CPT | Performed by: FAMILY MEDICINE

## 2019-11-05 PROCEDURE — 99285 EMERGENCY DEPT VISIT HI MDM: CPT

## 2019-11-05 PROCEDURE — 80053 COMPREHEN METABOLIC PANEL: CPT | Performed by: FAMILY MEDICINE

## 2019-11-05 PROCEDURE — 85025 COMPLETE CBC W/AUTO DIFF WBC: CPT | Performed by: FAMILY MEDICINE

## 2019-11-05 PROCEDURE — 82784 ASSAY IGA/IGD/IGG/IGM EACH: CPT | Performed by: FAMILY MEDICINE

## 2019-11-05 PROCEDURE — 83516 IMMUNOASSAY NONANTIBODY: CPT | Performed by: FAMILY MEDICINE

## 2019-11-05 PROCEDURE — 99284 EMERGENCY DEPT VISIT MOD MDM: CPT | Performed by: FAMILY MEDICINE

## 2019-11-05 RX ORDER — FAMOTIDINE 20 MG/1
40 TABLET, FILM COATED ORAL DAILY
Status: DISCONTINUED | OUTPATIENT
Start: 2019-11-06 | End: 2019-11-05

## 2019-11-05 RX ORDER — B-COMPLEX WITH VITAMIN C
1 TABLET ORAL 2 TIMES DAILY WITH MEALS
Status: DISCONTINUED | OUTPATIENT
Start: 2019-11-05 | End: 2019-11-09

## 2019-11-05 RX ORDER — MONTELUKAST SODIUM 10 MG/1
10 TABLET ORAL
Status: DISCONTINUED | OUTPATIENT
Start: 2019-11-05 | End: 2019-11-12 | Stop reason: HOSPADM

## 2019-11-05 RX ORDER — PANTOPRAZOLE SODIUM 40 MG/1
40 TABLET, DELAYED RELEASE ORAL
Status: DISCONTINUED | OUTPATIENT
Start: 2019-11-06 | End: 2019-11-12 | Stop reason: HOSPADM

## 2019-11-05 RX ORDER — ALBUTEROL SULFATE 90 UG/1
2 AEROSOL, METERED RESPIRATORY (INHALATION) EVERY 4 HOURS PRN
Status: DISCONTINUED | OUTPATIENT
Start: 2019-11-05 | End: 2019-11-06

## 2019-11-05 RX ORDER — POTASSIUM CHLORIDE 20 MEQ/1
20 TABLET, EXTENDED RELEASE ORAL DAILY
Status: DISCONTINUED | OUTPATIENT
Start: 2019-11-06 | End: 2019-11-12 | Stop reason: HOSPADM

## 2019-11-05 RX ORDER — ALPRAZOLAM 0.5 MG/1
0.5 TABLET ORAL
Status: DISCONTINUED | OUTPATIENT
Start: 2019-11-05 | End: 2019-11-12 | Stop reason: HOSPADM

## 2019-11-05 RX ORDER — ACETAMINOPHEN 325 MG/1
500 TABLET ORAL EVERY 8 HOURS PRN
Status: DISCONTINUED | OUTPATIENT
Start: 2019-11-05 | End: 2019-11-12 | Stop reason: HOSPADM

## 2019-11-05 RX ORDER — DILTIAZEM HYDROCHLORIDE 240 MG/1
240 CAPSULE, COATED, EXTENDED RELEASE ORAL DAILY
Status: DISCONTINUED | OUTPATIENT
Start: 2019-11-06 | End: 2019-11-12 | Stop reason: HOSPADM

## 2019-11-05 RX ORDER — ONDANSETRON 2 MG/ML
4 INJECTION INTRAMUSCULAR; INTRAVENOUS EVERY 6 HOURS PRN
Status: DISCONTINUED | OUTPATIENT
Start: 2019-11-05 | End: 2019-11-12 | Stop reason: HOSPADM

## 2019-11-05 RX ORDER — ALENDRONATE SODIUM 70 MG/1
70 TABLET ORAL
Status: DISCONTINUED | OUTPATIENT
Start: 2019-11-05 | End: 2019-11-10

## 2019-11-05 RX ORDER — POTASSIUM CHLORIDE 20 MEQ/1
40 TABLET, EXTENDED RELEASE ORAL ONCE
Status: COMPLETED | OUTPATIENT
Start: 2019-11-05 | End: 2019-11-05

## 2019-11-05 RX ORDER — SODIUM CHLORIDE AND POTASSIUM CHLORIDE .9; .15 G/100ML; G/100ML
100 SOLUTION INTRAVENOUS CONTINUOUS
Status: DISCONTINUED | OUTPATIENT
Start: 2019-11-05 | End: 2019-11-12

## 2019-11-05 RX ADMIN — CALCIUM CARBONATE-VITAMIN D TAB 500 MG-200 UNIT 1 TABLET: 500-200 TAB at 17:35

## 2019-11-05 RX ADMIN — SODIUM CHLORIDE 1000 ML: 0.9 INJECTION, SOLUTION INTRAVENOUS at 15:25

## 2019-11-05 RX ADMIN — MONTELUKAST SODIUM 10 MG: 10 TABLET, COATED ORAL at 21:53

## 2019-11-05 RX ADMIN — METOPROLOL TARTRATE 12.5 MG: 25 TABLET ORAL at 21:53

## 2019-11-05 RX ADMIN — ALPRAZOLAM 0.5 MG: 0.5 TABLET ORAL at 17:35

## 2019-11-05 RX ADMIN — PSYLLIUM HUSK 1 PACKET: 3.4 POWDER ORAL at 17:35

## 2019-11-05 RX ADMIN — POTASSIUM CHLORIDE 40 MEQ: 1500 TABLET, EXTENDED RELEASE ORAL at 16:15

## 2019-11-05 RX ADMIN — ACETAMINOPHEN 488 MG: 325 TABLET ORAL at 17:35

## 2019-11-05 RX ADMIN — APIXABAN 5 MG: 5 TABLET, FILM COATED ORAL at 17:35

## 2019-11-05 RX ADMIN — POTASSIUM CHLORIDE AND SODIUM CHLORIDE 100 ML/HR: 900; 150 INJECTION, SOLUTION INTRAVENOUS at 17:35

## 2019-11-05 NOTE — PLAN OF CARE
Problem: Potential for Falls  Goal: Patient will remain free of falls  Description  INTERVENTIONS:  - Assess patient frequently for physical needs  -  Identify cognitive and physical deficits and behaviors that affect risk of falls    -  [place] fall precautions as indicated by assessment   - Educate patient/family on patient safety including physical limitations  - Instruct patient to call for assistance with activity based on assessment  - Modify environment to reduce risk of injury  - Consider OT/PT consult to assist with strengthening/mobility  Outcome: Progressing     Problem: PAIN - ADULT  Goal: Verbalizes/displays adequate comfort level or baseline comfort level  Description  Interventions:  - Encourage patient to monitor pain and request assistance  - Assess pain using appropriate pain scale  - Administer analgesics based on type and severity of pain and evaluate response  - Implement non-pharmacological measures as appropriate and evaluate response  - Consider cultural and social influences on pain and pain management  - Notify physician/advanced practitioner if interventions unsuccessful or patient reports new pain  Outcome: Progressing     Problem: INFECTION - ADULT  Goal: Absence or prevention of progression during hospitalization  Description  INTERVENTIONS:  - Assess and monitor for signs and symptoms of infection  - Monitor lab/diagnostic results  - Monitor all insertion sites, i e  indwelling lines, tubes, and drains  - Monitor endotracheal if appropriate and nasal secretions for changes in amount and color  - [place] appropriate cooling/warming therapies per order  - Administer medications as ordered  - Instruct and encourage patient and family to use good hand hygiene technique  - Identify and instruct in appropriate isolation precautions for identified infection/condition  Outcome: Progressing  Goal: Absence of fever/infection during neutropenic period  Description  INTERVENTIONS:  - Monitor WBC    Outcome: Progressing     Problem: SAFETY ADULT  Goal: Maintain or return to baseline ADL function  Description  INTERVENTIONS:  -  Assess patient's ability to carry out ADLs; assess patient's baseline for ADL function and identify physical deficits which impact ability to perform ADLs (bathing, care of mouth/teeth, toileting, grooming, dressing, etc )  - Assess/evaluate cause of self-care deficits   - Assess range of motion  - Assess patient's mobility; develop plan if impaired  - Assess patient's need for assistive devices and provide as appropriate  - Encourage maximum independence but intervene and supervise when necessary  - Involve family in performance of ADLs  - Assess for home care needs following discharge   - Consider OT consult to assist with ADL evaluation and planning for discharge  - Provide patient education as appropriate  Outcome: Progressing  Goal: Maintain or return mobility status to optimal level  Description  INTERVENTIONS:  - Assess patient's baseline mobility status (ambulation, transfers, stairs, etc )    - Identify cognitive and physical deficits and behaviors that affect mobility  - Identify mobility aids required to assist with transfers and/or ambulation (gait belt, sit-to-stand, lift, walker, cane, etc )  - [place] fall precautions as indicated by assessment  - Record patient progress and toleration of activity level on Mobility SBAR; progress patient to next Phase/Stage  - Instruct patient to call for assistance with activity based on assessment  - Consider rehabilitation consult to assist with strengthening/weightbearing, etc   Outcome: Progressing     Problem: DISCHARGE PLANNING  Goal: Discharge to home or other facility with appropriate resources  Description  INTERVENTIONS:  - Identify barriers to discharge w/patient and caregiver  - Arrange for needed discharge resources and transportation as appropriate  - Identify discharge learning needs (meds, wound care, etc )  - Arrange for interpretive services to assist at discharge as needed  - Refer to Case Management Department for coordinating discharge planning if the patient needs post-hospital services based on physician/advanced practitioner order or complex needs related to functional status, cognitive ability, or social support system  Outcome: Progressing     Problem: Knowledge Deficit  Goal: Patient/family/caregiver demonstrates understanding of disease process, treatment plan, medications, and discharge instructions  Description  Complete learning assessment and assess knowledge base    Interventions:  - Provide teaching at level of understanding  - Provide teaching via preferred learning methods  Outcome: Progressing

## 2019-11-05 NOTE — ED PROVIDER NOTES
History  Chief Complaint   Patient presents with    Diarrhea     HPI  This  Is a 82-year-old female discharged from hospital yesterday was treated for colitis  Patient presented to ED today with the complain of diarrheal episode that started this morning  Patient states she has loose watery diarrhea and had 9-10 episode today  She denies any blood in the stool  She is denying any abdominal pain nausea vomiting  Patient is denying any fever chills  Patient was scheduled for colonoscopy as an outpatient however states she a cannot wait due to severe diarrhea  Prior to Admission Medications   Prescriptions Last Dose Informant Patient Reported? Taking?    ALPRAZolam (XANAX) 0 5 mg tablet   Yes No   Sig: Take 0 5 mg by mouth daily at bedtime as needed   Albuterol Sulfate 108 (90 Base) MCG/ACT AEPB   Yes No   Sig: Inhale 2 puffs 3 (three) times a day as needed   Calcium Carb-Cholecalciferol (CALCIUM 1000 + D PO)   Yes No   Sig: Take 1,000 mg by mouth daily   alendronate (FOSAMAX) 70 mg tablet   No No   Sig: Take 1 tablet (70 mg total) by mouth every 7 days   apixaban (ELIQUIS) 5 mg   No No   Sig: Take 1 tablet (5 mg total) by mouth 2 (two) times a day   diltiazem (CARDIZEM CD) 240 mg 24 hr capsule   No No   Sig: Take 1 capsule (240 mg total) by mouth daily   famotidine (PEPCID) 40 MG tablet   No No   Sig: Take 1 tablet (40 mg total) by mouth daily at bedtime as needed for indigestion or heartburn   fluticasone-umeclidinium-vilanterol (TRELEGY ELLIPTA) 100-62 5-25 MCG/INH inhaler   Yes No   Si puff daily   furosemide (LASIX) 20 mg tablet   No No   Sig: Take 1 tablet (20 mg total) by mouth daily   ipratropium-albuterol (COMBIVENT RESPIMAT) inhaler   Yes No   Sig: Every 6 hours   loperamide (IMODIUM) 2 mg capsule   No No   Sig: Take 1 capsule (2 mg total) by mouth 3 (three) times a day as needed for diarrhea   metoprolol tartrate (LOPRESSOR) 25 mg tablet   No No   Sig: Take 0 5 tablets (12 5 mg total) by mouth every 12 (twelve) hours   montelukast (SINGULAIR) 10 mg tablet   No No   Sig: Take 1 tablet (10 mg total) by mouth daily at bedtime   potassium chloride (MICRO-K) 10 MEQ CR capsule   No No   Sig: Take 1 capsule (10 mEq total) by mouth daily   psyllium (METAMUCIL) packet   No No   Sig: Take 1 packet by mouth 3 (three) times a day      Facility-Administered Medications: None       Past Medical History:   Diagnosis Date    Asthma     Atrial fibrillation     Blurred vision     Cardiac disease     Colitis     COPD (chronic obstructive pulmonary disease)     Diverticulosis     DJD (degenerative joint disease)     Emphysema lung     Gait abnormality        Past Surgical History:   Procedure Laterality Date    BLADDER SURGERY      COLON SURGERY      COLONOSCOPY W/ POLYPECTOMY N/A 2019    Procedure: COLONOSCOPY W/ POLYPECTOMY;  Surgeon: Kaley Prado MD;  Location: [facility] GI LAB; Service: General    SMALL INTESTINE SURGERY         Family History   Problem Relation Age of Onset    Heart disease Mother      I have reviewed and agree with the history as documented  Social History     Tobacco Use    Smoking status: Former Smoker     Last attempt to quit: 2013     Years since quittin 9    Smokeless tobacco: Never Used   Substance Use Topics    Alcohol use: Not Currently     Frequency: Never     Binge frequency: Never    Drug use: No        Review of Systems   Constitutional: Negative  Respiratory: Negative  Cardiovascular: Negative  Gastrointestinal: Positive for diarrhea  Negative for abdominal distention, abdominal pain, blood in stool, constipation, nausea, rectal pain and vomiting  Genitourinary: Negative  Musculoskeletal: Negative  Neurological: Negative  Physical Exam  Physical Exam   Constitutional: She is oriented to person, place, and time  She appears well-developed and well-nourished  No distress  HENT:   Head: Normocephalic and atraumatic     Neck: Normal range of motion  Neck supple  Cardiovascular: Normal rate, regular rhythm and normal heart sounds  Pulmonary/Chest: Effort normal and breath sounds normal    Abdominal: Soft  Bowel sounds are normal  There is no tenderness  Musculoskeletal: Normal range of motion  Neurological: She is alert and oriented to person, place, and time  Skin: Skin is warm  Nursing note and vitals reviewed        Vital Signs  ED Triage Vitals [11/05/19 1500]   Temperature Pulse Respirations Blood Pressure SpO2   (!) 96 3 °F (35 7 °C) 84 16 125/61 93 %      Temp Source Heart Rate Source Patient Position - Orthostatic VS BP Location FiO2 (%)   Temporal -- -- -- --      Pain Score       No Pain           Vitals:    11/05/19 1500   BP: 125/61   Pulse: 84         Visual Acuity      ED Medications  Medications   sodium chloride 0 9 % bolus 1,000 mL (1,000 mL Intravenous New Bag 11/5/19 1525)   potassium chloride (K-DUR,KLOR-CON) CR tablet 40 mEq (has no administration in time range)   psyllium (METAMUCIL) 1 packet (has no administration in time range)       Diagnostic Studies  Results Reviewed     Procedure Component Value Units Date/Time    Comprehensive metabolic panel [[id number]]  (Abnormal) Collected:  11/05/19 1523    Lab Status:  Final result Specimen:  Blood from Arm, Right Updated:  11/05/19 1555     Sodium 138 mmol/L      Potassium 3 5 mmol/L      Chloride 101 mmol/L      CO2 30 mmol/L      ANION GAP 7 mmol/L      BUN 11 mg/dL      Creatinine 0 60 mg/dL      Glucose 115 mg/dL      Calcium 9 1 mg/dL      AST 15 U/L      ALT 21 U/L      Alkaline Phosphatase 107 U/L      Total Protein 6 1 g/dL      Albumin 3 6 g/dL      Total Bilirubin 0 40 mg/dL      eGFR 85 ml/min/1 73sq m     Narrative:       Kaylyn guidelines for Chronic Kidney Disease (CKD):     Stage 1 with normal or high GFR (GFR > 90 mL/min/1 73 square meters)    Stage 2 Mild CKD (GFR = 60-89 mL/min/1 73 square meters)    Stage 3A Moderate CKD (GFR = 45-59 mL/min/1 73 square meters)    Stage 3B Moderate CKD (GFR = 30-44 mL/min/1 73 square meters)    Stage 4 Severe CKD (GFR = 15-29 mL/min/1 73 square meters)    Stage 5 End Stage CKD (GFR <15 mL/min/1 73 square meters)  Note: GFR calculation is accurate only with a steady state creatinine    CBC and differential [[id number]]  (Abnormal) Collected:  11/05/19 1523    Lab Status:  Final result Specimen:  Blood from Arm, Right Updated:  11/05/19 1538     WBC 5 50 Thousand/uL      RBC 4 07 Million/uL      Hemoglobin 10 9 g/dL      Hematocrit 34 4 %      MCV 85 fL      MCH 26 8 pg      MCHC 31 7 g/dL      RDW 16 4 %      MPV 7 6 fL      Platelets 654 Thousands/uL      Neutrophils Relative 66 %      Lymphocytes Relative 22 %      Monocytes Relative 10 %      Eosinophils Relative 2 %      Basophils Relative 0 %      Neutrophils Absolute 3 60 Thousands/µL      Lymphocytes Absolute 1 20 Thousands/µL      Monocytes Absolute 0 60 Thousand/µL      Eosinophils Absolute 0 10 Thousand/µL      Basophils Absolute 0 00 Thousands/µL                  No orders to display              Procedures  Procedures       ED Course  ED Course as of Nov 05 1604 Tue Nov 05, 2019   1541 Case is discussed with Emilie Moses physician assistant on call states he will speak with Dr Marlene Guerrero   Will Admit                                   MDM    Disposition  Final diagnoses:   Diarrhea, unspecified type   Colitis     Time reflects when diagnosis was documented in both MDM as applicable and the Disposition within this note     Time User Action Codes Description Comment    11/5/2019  3:39 PM Aldo Malloy Add [R19 7] Diarrhea, unspecified type     11/5/2019  3:39 PM Aldo Aposman Add [K52 9] Colitis       ED Disposition     ED Disposition Condition Date/Time Comment    Admit Stable Tue Nov 5, 2019  3:39 PM Case was discussed with ki and the patient's admission status was agreed to be Admission Status: observation status to the service of Dr Marlene Guerrero          Follow-up Information    None         Patient's Medications   Discharge Prescriptions    No medications on file     No discharge procedures on file      ED Provider  Electronically Signed by           Rosita Guerra MD  11/05/19 8133

## 2019-11-05 NOTE — H&P
Shwetha,#  SWP:[birth year] F  ZXB:[id number]    A:[id number]  Adm Date: 11/5/2019 1458  2:58 PM   ATT PHY: [phone]         Chief Complaint:  Intractable diarrhea    History of Presenting Illness: Robert Mortensen is a(n) 82y o  year old female who was just discharged yesterday from the hospital came back today in the emergency department with 9 episodes of diarrhea earlier in the morning and 3 episodes in the emergency department  Patient was admitted with similar symptoms on 11/01/2019 and all the workup including stool studies were found to be negative for any evidence of infection  Patient denied any nausea vomitings or abdominal pain  Patient was admitted for fluid resuscitation  On examination at bedside, patient appears in discomfort  Allergies   Allergen Reactions    Fluticasone        No current facility-administered medications on file prior to encounter        Current Outpatient Medications on File Prior to Encounter   Medication Sig Dispense Refill    Albuterol Sulfate 108 (90 Base) MCG/ACT AEPB Inhale 2 puffs 3 (three) times a day as needed      alendronate (FOSAMAX) 70 mg tablet Take 1 tablet (70 mg total) by mouth every 7 days 4 tablet 1    ALPRAZolam (XANAX) 0 5 mg tablet Take 0 5 mg by mouth daily at bedtime as needed      apixaban (ELIQUIS) 5 mg Take 1 tablet (5 mg total) by mouth 2 (two) times a day 60 tablet 1    Calcium Carb-Cholecalciferol (CALCIUM 1000 + D PO) Take 1,000 mg by mouth daily      diltiazem (CARDIZEM CD) 240 mg 24 hr capsule Take 1 capsule (240 mg total) by mouth daily 30 capsule 1    famotidine (PEPCID) 40 MG tablet Take 1 tablet (40 mg total) by mouth daily at bedtime as needed for indigestion or heartburn 30 tablet 1    fluticasone-umeclidinium-vilanterol (TRELEGY ELLIPTA) 100-62 5-25 MCG/INH inhaler 1 puff daily      furosemide (LASIX) 20 mg tablet Take 1 tablet (20 mg total) by mouth daily 30 tablet 1    ipratropium-albuterol (COMBIVENT RESPIMAT) inhaler Every 6 hours      loperamide (IMODIUM) 2 mg capsule Take 1 capsule (2 mg total) by mouth 3 (three) times a day as needed for diarrhea 30 capsule 1    metoprolol tartrate (LOPRESSOR) 25 mg tablet Take 0 5 tablets (12 5 mg total) by mouth every 12 (twelve) hours 60 tablet 1    montelukast (SINGULAIR) 10 mg tablet Take 1 tablet (10 mg total) by mouth daily at bedtime 30 tablet 1    potassium chloride (MICRO-K) 10 MEQ CR capsule Take 1 capsule (10 mEq total) by mouth daily 30 capsule 1    psyllium (METAMUCIL) packet Take 1 packet by mouth 3 (three) times a day 90 packet 1       Active Ambulatory Problems     Diagnosis Date Noted    COPD (chronic obstructive pulmonary disease) ([facility] ) 12/28/2018    Cardiac disease 12/28/2018    Diverticulosis of intestine with bleeding 12/28/2018    Diarrhea 12/29/2018    Colorectal polyps 01/21/2019    Effusion of bursa of left elbow 09/23/2019    Cardiomegaly 01/23/2018    Chronic anxiety 01/23/2018    Chronic cystitis 12/11/2013    Chronic low back pain 08/13/2018    [facility]est[facility] failure ([facility] ) 01/23/2018    Deviated nasal septum 10/20/2017    Diverticulosis of colon 01/21/2019    Gastroesophageal reflux disease without esophagitis 03/28/2018    Gout 07/20/2016    History of angioedema 11/18/2017    History of colonic polyps 01/21/2019    Lumbar radiculopathy 08/13/2018    Macular degeneration of both eyes 04/18/2019    Obesity 02/28/2018    Osteoporosis 03/08/2018    Other specified forms of chronic ischemic heart disease 12/11/2013    Seasonal allergies 05/02/2018    Panic attack 01/23/2018    Vocal cord dysfunction 10/20/2017    Colitis presumed infectious 10/03/2019    Atrial fibrillation with rapid ventricular response ([facility] ) 10/06/2019    Left elbow pain 10/06/2019    Chronic respiratory failure with hypoxia ([facility] ) 10/07/2019    Debility 10/11/2019    Positive culture findings in sputum 10/12/2019     Resolved Ambulatory Problems     Diagnosis Date Noted    Rectal bleed 2018    Asthma 2018    Herpes zoster 2012     Past Medical History:   Diagnosis Date    Atrial fibrillation     Blurred vision     Colitis     Diverticulosis     DJD (degenerative joint disease)     Emphysema lung     Gait abnormality        Past Surgical History:   Procedure Laterality Date    BLADDER SURGERY      COLON SURGERY      COLONOSCOPY W/ POLYPECTOMY N/A 2019    Procedure: COLONOSCOPY W/ POLYPECTOMY;  Surgeon: Junito Sanchez MD;  Location: [address] [place] [address] GI LAB; Service: General    SMALL INTESTINE SURGERY         Social History:   Social History     Socioeconomic History    Marital status:       Spouse name: None    Number of children: None    Years of education: None    Highest education level: None   Occupational History    None   Social Needs    Financial resource strain: None    Food insecurity:     Worry: None     Inability: None    Transportation needs:     Medical: None     Non-medical: None   Tobacco Use    Smoking status: Former Smoker     Last attempt to quit: 2013     Years since quittin 9    Smokeless tobacco: Never Used   Substance and Sexual Activity    Alcohol use: Not Currently     Frequency: Never     Binge frequency: Never    Drug use: No    Sexual activity: Not Currently   Lifestyle    Physical activity:     Days per week: None     Minutes per session: None    Stress: None   Relationships    Social connections:     Talks on phone: None     Gets together: None     Attends Sabianist service: None     Active member of club or organization: None     Attends meetings of clubs or organizations: None     Relationship status: None    Intimate partner violence:     Fear of current or ex partner: None     Emotionally abused: None     Physically abused: None     Forced sexual activity: None   Other Topics Concern    None   Social History Narrative    None       Family History:   Family History   Problem Relation Age of Onset    Heart disease Mother        Review of Systems   Gastrointestinal: Positive for diarrhea  Musculoskeletal: Positive for arthralgias, back pain and gait problem  All other systems reviewed and are negative  Physical Exam   Constitutional: Awake and Alert  Well-developed and well-nourished  No distress  HENT: PERR,EOMI, conjunctiva normal  Head: Normocephalic and atraumatic  Mouth/Throat: Oropharynx is clear and dry mucosa  Eyes: Conjunctivae and EOM are normal  Pupils are equal, round, and reactive to light  Right and left eye exhibits no discharge  Neck: Neck supple  No tracheal deviation present  No thyromegaly present  Cardiovascular: Normal rate, regular rhythm and normal heart sounds  Exam reveals no friction rub  No murmur heard  Pulmonary/Chest: Effort normal and breath sounds normal  No respiratory distress  She has no wheezes  Abdominal: Soft  Bowel sounds are normal  She exhibits no distension  There is no tenderness  There is no rebound and no guarding  Neurological: Cranial Nerves grossly intact  No sensory deficit  Coordination normal    Musculoskeletal:   Nontender spine  Skin: Skin is warm and dry  No rash noted  No diaphoresis  No erythema  No edema  No cyanosis  Assessment     Ivet Chang is a(n) 82y o  year old female with intractable diarrhea with dehydration    1  Chronic diarrhea with possible colitis of unknown etiology   All recent workup was found to be negative for any evidence of infection  Dr Austin Skelton seen the patient in the previous admission 3-4 days ago  Reji Peña thinks it is a possible medication induced versus microscopic colitis  Reji Peña has recommended repeat colonoscopy with biopsies on outpatient basis   I will re-consult GI for possible colonoscopy and biopsy on ointment patient basis    Patient is on stool bulking agent Metamucil   I have also put the patient on lactose-free diet  I will order celiac disease antibody profile  Patient may gets Zofran on as needed basis  2  Dehydration  Patient has been put on IV fluids normal saline with potassium chloride at 100 cc an hour   3  Generalized weakness  I will consult PT OT for evaluation and treatment  4  Cardiac with history of hypertension, atrial fibrillation   Patient will be continued on Eliquis, diltiazem, Lasix metoprolol and potassium chloride  5  COPD   Patient is getting Breo Ellipta, Combivent, albuterol, ipratropium nebs and montelukast along with oxygen on as-needed basis via nasal cannula  6  Osteoporosis   Patient is on alendronate along with calcium and vitamin-D   7  DJD/osteoarthritis with the right thigh/leg pain  Patient has been put on Voltaren gel along with heating pad over the leg   Patient may get Tylenol on as needed basis  8  GERD   Patient is on Protonix        Prognosis: fair      Discharge Plan: in progress      Advanced Directives: I have discussed in detail the patient the advanced directives   Patient has appointed one of her close friend Lorri Pierre her phone number is [phone]  Patient also claims that she has living will with advanced directives  When discussing cardiac and pulmonary resuscitation efforts with the patient,wishes to be FULL CODE      I have spent more than 50 minutes gathering data, doing physical examination, and discussing the advanced directives, which was witnessed by caring staff

## 2019-11-06 LAB
ALBUMIN SERPL BCP-MCNC: 3.1 G/DL (ref 3.5–5.7)
ALP SERPL-CCNC: 85 U/L (ref 55–165)
ALT SERPL W P-5'-P-CCNC: 17 U/L (ref 7–52)
ANION GAP SERPL CALCULATED.3IONS-SCNC: 6 MMOL/L (ref 4–13)
AST SERPL W P-5'-P-CCNC: 12 U/L (ref 13–39)
BASOPHILS # BLD AUTO: 0 THOUSANDS/ΜL (ref 0–0.1)
BASOPHILS NFR BLD AUTO: 1 % (ref 0–2)
BILIRUB SERPL-MCNC: 0.3 MG/DL (ref 0.2–1)
BUN SERPL-MCNC: 5 MG/DL (ref 7–25)
CALCIUM SERPL-MCNC: 8.6 MG/DL (ref 8.6–10.5)
CHLORIDE SERPL-SCNC: 107 MMOL/L (ref 98–107)
CO2 SERPL-SCNC: 26 MMOL/L (ref 21–31)
CREAT SERPL-MCNC: 0.42 MG/DL (ref 0.6–1.2)
EOSINOPHIL # BLD AUTO: 0.1 THOUSAND/ΜL (ref 0–0.61)
EOSINOPHIL NFR BLD AUTO: 3 % (ref 0–5)
ERYTHROCYTE [DISTWIDTH] IN BLOOD BY AUTOMATED COUNT: 16.3 % (ref 11.5–14.5)
GFR SERPL CREATININE-BSD FRML MDRD: 96 ML/MIN/1.73SQ M
GLUCOSE SERPL-MCNC: 106 MG/DL (ref 65–99)
HCT VFR BLD AUTO: 29.6 % (ref 42–47)
HGB BLD-MCNC: 9.3 G/DL (ref 12–16)
LYMPHOCYTES # BLD AUTO: 0.9 THOUSANDS/ΜL (ref 0.6–4.47)
LYMPHOCYTES NFR BLD AUTO: 24 % (ref 21–51)
MCH RBC QN AUTO: 26.6 PG (ref 26–34)
MCHC RBC AUTO-ENTMCNC: 31.5 G/DL (ref 31–37)
MCV RBC AUTO: 85 FL (ref 81–99)
MONOCYTES # BLD AUTO: 0.5 THOUSAND/ΜL (ref 0.17–1.22)
MONOCYTES NFR BLD AUTO: 13 % (ref 2–12)
NEUTROPHILS # BLD AUTO: 2.3 THOUSANDS/ΜL (ref 1.4–6.5)
NEUTS SEG NFR BLD AUTO: 60 % (ref 42–75)
PLATELET # BLD AUTO: 215 THOUSANDS/UL (ref 149–390)
PMV BLD AUTO: 7.5 FL (ref 8.6–11.7)
POTASSIUM SERPL-SCNC: 3.7 MMOL/L (ref 3.5–5.5)
PROT SERPL-MCNC: 5.5 G/DL (ref 6.4–8.9)
RBC # BLD AUTO: 3.51 MILLION/UL (ref 3.9–5.2)
SODIUM SERPL-SCNC: 139 MMOL/L (ref 134–143)
WBC # BLD AUTO: 3.9 THOUSAND/UL (ref 4.8–10.8)

## 2019-11-06 PROCEDURE — 94760 N-INVAS EAR/PLS OXIMETRY 1: CPT

## 2019-11-06 PROCEDURE — 97167 OT EVAL HIGH COMPLEX 60 MIN: CPT

## 2019-11-06 PROCEDURE — 94664 DEMO&/EVAL PT USE INHALER: CPT

## 2019-11-06 PROCEDURE — 85025 COMPLETE CBC W/AUTO DIFF WBC: CPT | Performed by: FAMILY MEDICINE

## 2019-11-06 PROCEDURE — 97163 PT EVAL HIGH COMPLEX 45 MIN: CPT

## 2019-11-06 PROCEDURE — G8987 SELF CARE CURRENT STATUS: HCPCS

## 2019-11-06 PROCEDURE — G8979 MOBILITY GOAL STATUS: HCPCS

## 2019-11-06 PROCEDURE — G8978 MOBILITY CURRENT STATUS: HCPCS

## 2019-11-06 PROCEDURE — G8988 SELF CARE GOAL STATUS: HCPCS

## 2019-11-06 PROCEDURE — 80053 COMPREHEN METABOLIC PANEL: CPT | Performed by: FAMILY MEDICINE

## 2019-11-06 PROCEDURE — 97530 THERAPEUTIC ACTIVITIES: CPT

## 2019-11-06 RX ORDER — IPRATROPIUM BROMIDE AND ALBUTEROL SULFATE 2.5; .5 MG/3ML; MG/3ML
3 SOLUTION RESPIRATORY (INHALATION)
Status: DISCONTINUED | OUTPATIENT
Start: 2019-11-07 | End: 2019-11-09

## 2019-11-06 RX ORDER — ALBUTEROL SULFATE 90 UG/1
2 AEROSOL, METERED RESPIRATORY (INHALATION) EVERY 6 HOURS PRN
Status: DISCONTINUED | OUTPATIENT
Start: 2019-11-06 | End: 2019-11-12 | Stop reason: HOSPADM

## 2019-11-06 RX ORDER — ALBUTEROL SULFATE 90 UG/1
2 AEROSOL, METERED RESPIRATORY (INHALATION) EVERY 4 HOURS
Status: DISCONTINUED | OUTPATIENT
Start: 2019-11-06 | End: 2019-11-06

## 2019-11-06 RX ORDER — IPRATROPIUM BROMIDE AND ALBUTEROL SULFATE 2.5; .5 MG/3ML; MG/3ML
3 SOLUTION RESPIRATORY (INHALATION)
Status: DISCONTINUED | OUTPATIENT
Start: 2019-11-06 | End: 2019-11-06

## 2019-11-06 RX ADMIN — METOPROLOL TARTRATE 12.5 MG: 25 TABLET ORAL at 22:00

## 2019-11-06 RX ADMIN — DILTIAZEM HYDROCHLORIDE 240 MG: 240 CAPSULE, COATED, EXTENDED RELEASE ORAL at 09:12

## 2019-11-06 RX ADMIN — POTASSIUM CHLORIDE 20 MEQ: 1500 TABLET, EXTENDED RELEASE ORAL at 09:12

## 2019-11-06 RX ADMIN — PANTOPRAZOLE SODIUM 40 MG: 40 TABLET, DELAYED RELEASE ORAL at 05:10

## 2019-11-06 RX ADMIN — POTASSIUM CHLORIDE AND SODIUM CHLORIDE 100 ML/HR: 900; 150 INJECTION, SOLUTION INTRAVENOUS at 13:37

## 2019-11-06 RX ADMIN — PSYLLIUM HUSK 1 PACKET: 3.4 POWDER ORAL at 09:13

## 2019-11-06 RX ADMIN — APIXABAN 5 MG: 5 TABLET, FILM COATED ORAL at 17:00

## 2019-11-06 RX ADMIN — APIXABAN 5 MG: 5 TABLET, FILM COATED ORAL at 09:11

## 2019-11-06 RX ADMIN — IPRATROPIUM BROMIDE AND ALBUTEROL SULFATE 3 ML: 2.5; .5 SOLUTION RESPIRATORY (INHALATION) at 20:02

## 2019-11-06 RX ADMIN — PSYLLIUM HUSK 1 PACKET: 3.4 POWDER ORAL at 16:49

## 2019-11-06 RX ADMIN — CALCIUM CARBONATE-VITAMIN D TAB 500 MG-200 UNIT 1 TABLET: 500-200 TAB at 16:51

## 2019-11-06 RX ADMIN — ALBUTEROL SULFATE 2 PUFF: 90 AEROSOL, METERED RESPIRATORY (INHALATION) at 14:23

## 2019-11-06 RX ADMIN — POTASSIUM CHLORIDE AND SODIUM CHLORIDE 100 ML/HR: 900; 150 INJECTION, SOLUTION INTRAVENOUS at 23:40

## 2019-11-06 RX ADMIN — BISACODYL 5 MG: 5 TABLET, COATED ORAL at 18:42

## 2019-11-06 RX ADMIN — MONTELUKAST SODIUM 10 MG: 10 TABLET, COATED ORAL at 21:00

## 2019-11-06 RX ADMIN — PSYLLIUM HUSK 1 PACKET: 3.4 POWDER ORAL at 21:00

## 2019-11-06 RX ADMIN — METOPROLOL TARTRATE 12.5 MG: 25 TABLET ORAL at 09:12

## 2019-11-06 RX ADMIN — CALCIUM CARBONATE-VITAMIN D TAB 500 MG-200 UNIT 1 TABLET: 500-200 TAB at 07:38

## 2019-11-06 RX ADMIN — POLYETHYLENE GLYCOL 3350, SODIUM SULFATE ANHYDROUS, SODIUM BICARBONATE, SODIUM CHLORIDE, POTASSIUM CHLORIDE 4000 ML: 236; 22.74; 6.74; 5.86; 2.97 POWDER, FOR SOLUTION ORAL at 18:43

## 2019-11-06 RX ADMIN — POTASSIUM CHLORIDE AND SODIUM CHLORIDE 100 ML/HR: 900; 150 INJECTION, SOLUTION INTRAVENOUS at 03:47

## 2019-11-06 RX ADMIN — ACETAMINOPHEN 488 MG: 325 TABLET ORAL at 16:52

## 2019-11-06 NOTE — PLAN OF CARE
Problem: OCCUPATIONAL THERAPY ADULT  Goal: Performs self-care activities at highest level of function for planned discharge setting  See evaluation for individualized goals  Description  Treatment Interventions: ADL retraining, Functional transfer training, UE strengthening/ROM, Cognitive reorientation, Endurance training, Compensatory technique education, Energy conservation          See flowsheet documentation for full assessment, interventions and recommendations  Note:   Limitation: Decreased ADL status, Decreased UE strength, Decreased Safe judgement during ADL, Decreased cognition, Decreased endurance, Decreased self-care trans, Decreased high-level ADLs  Prognosis: Good  Assessment: Pt is a 82 y o  female who was admitted to [address] [place] [address] on 11/5/2019 with Diarrhea and Colitis  At this time, patient is also affected by the comorbidities of: COPD, cardiac disease, effusion of bursa of L elbow, anxiety, chronic low back pain, CHF, GERD, gout, macular degeneration of B eyes, and panic attack  Additionally, patient is affected by the following personal factors:steps to enter environment, limited home support, difficulty performing ADLS and difficulty performing IADLS   Orders placed for OT evaluation/treatment with activity as tolerated orders  Prior to admission, Patient reporting being independent with ADLs, ambulatory with RW, and lives alone in one story house with 3 SAVANNAH  Upon OT evaluation, patient requires supervision/set-up for UB ADLs and minimum assist for LB ADLs  Occupational performance is affected by the following deficits: decreased strength, decreased balance, decreased tolerance, impaired memory, impaired sequencing, impaired problem solving, impulsivity and decreased safety awareness   Based on the following information, patient would benefit from continued skilled OT treatment while in the hospital to address deficits and maximize level of functional independence with ADL's and functional mobility  Occupational Performance areas to address include: grooming, bathing/shower, toilet hygiene, dressing, medication management, functional mobility, clothing management, meal prep and household maintenance  From OT standpoint, recommendation at time of d/c would be home OT       OT Discharge Recommendation: Home OT  OT - OK to Discharge: Yes(Once medically cleared)     Amos Hernandez OT

## 2019-11-06 NOTE — PLAN OF CARE
Problem: DISCHARGE PLANNING - CARE MANAGEMENT  Goal: Discharge to post-acute care or home with appropriate resources  Description  INTERVENTIONS:  - Conduct assessment to determine patient/family and health care team treatment goals, and need for post-acute services based on payer coverage, community resources, and patient preferences, and barriers to discharge  - Address psychosocial, clinical, and financial barriers to discharge as identified in assessment in conjunction with the patient/family and health care team  - Arrange appropriate level of post-acute services according to patient's   needs and preference and payer coverage in collaboration with the physician and health care team  - Communicate with and update the patient/family, physician, and health care team regarding progress on the discharge plan  - Arrange appropriate transportation to post-acute venues    Pt's goal is to return home with [facility]   Outcome: Progressing

## 2019-11-06 NOTE — PROGRESS NOTES
Progress Note - Gabriela Cristobal 82 y o  female MRN: [id number]    Unit/Bed#: -01 Encounter: [id number]        Subjective:   Patient seen and examined at bedside after reviewing the chart and discussing the case with the caring staff  Patient examined at bedside  Patient denied any episodes of diarrhea today  Patient's last diarrhea episode were at night yesterday  Patient denied any nausea or vomiting episodes  Physical Exam   Vitals: Blood pressure 112/58, pulse 78, temperature 98 2 °F (36 8 °C), temperature source Temporal, resp  rate 20, height 5' (1 524 m), weight 70 5 kg (155 lb 6 8 oz), SpO2 98 %, not currently breastfeeding  ,Body mass index is 30 35 kg/m²  Constitutional: He appears well-developed  HEENT: PERR, EOMI, MMM  Cardiovascular: Normal rate and regular rhythm  Pulmonary/Chest: Effort normal and breath sounds normal    Abdomen: Soft, + BS, NT    Assessment/Plan:  Gabriela Cristobal is a(n) 82y o  year old female with intractable diarrhea with dehydration     1  Chronic diarrhea with possible colitis of unknown etiology/GERD   All recent workup was found to be negative for any evidence of infection  Dr Sarah Bradshaw seen the patient in the previous admission 3-4 days ago  Armando Huffman thinks it is a possible medication induced versus microscopic colitis  Armando Huffman has recommended repeat colonoscopy with biopsies on outpatient basis   I will re-consult GI for possible colonoscopy and biopsy on ointment patient basis  Patient is on stool bulking agent Metamucil   I have also put the patient on lactose-free diet  I will order celiac disease antibody profile  Patient is on Protonix  Patient may gets Zofran on as needed basis  2  Dehydration  Patient has been put on IV fluids normal saline with potassium chloride at 100 cc an hour   3   Generalized weakness   I will consult PT OT for evaluation and treatment     4  Cardiac with history of hypertension, atrial fibrillation   Patient will be continued on Eliquis, diltiazem, Lasix metoprolol and potassium chloride  5  COPD   Patient is getting Breo Ellipta, Combivent, albuterol, ipratropium nebs and montelukast along with oxygen on as-needed basis via nasal cannula  6  Osteoporosis   Patient is on alendronate along with calcium and vitamin-D   7   DJD/osteoarthritis with the right thigh/leg pain   Patient has been put on Voltaren gel along with heating pad over the leg   Patient may get Tylenol on as needed basis

## 2019-11-06 NOTE — PLAN OF CARE
Problem: Potential for Falls  Goal: Patient will remain free of falls  Description  INTERVENTIONS:  - Assess patient frequently for physical needs  -  Identify cognitive and physical deficits and behaviors that affect risk of falls    -  [place] fall precautions as indicated by assessment   - Educate patient/family on patient safety including physical limitations  - Instruct patient to call for assistance with activity based on assessment  - Modify environment to reduce risk of injury  - Consider OT/PT consult to assist with strengthening/mobility  Outcome: Progressing     Problem: PAIN - ADULT  Goal: Verbalizes/displays adequate comfort level or baseline comfort level  Description  Interventions:  - Encourage patient to monitor pain and request assistance  - Assess pain using appropriate pain scale  - Administer analgesics based on type and severity of pain and evaluate response  - Implement non-pharmacological measures as appropriate and evaluate response  - Consider cultural and social influences on pain and pain management  - Notify physician/advanced practitioner if interventions unsuccessful or patient reports new pain  Outcome: Progressing     Problem: INFECTION - ADULT  Goal: Absence or prevention of progression during hospitalization  Description  INTERVENTIONS:  - Assess and monitor for signs and symptoms of infection  - Monitor lab/diagnostic results  - Monitor all insertion sites, i e  indwelling lines, tubes, and drains  - Monitor endotracheal if appropriate and nasal secretions for changes in amount and color  - [place] appropriate cooling/warming therapies per order  - Administer medications as ordered  - Instruct and encourage patient and family to use good hand hygiene technique  - Identify and instruct in appropriate isolation precautions for identified infection/condition  Outcome: Progressing  Goal: Absence of fever/infection during neutropenic period  Description  INTERVENTIONS:  - Monitor WBC    Outcome: Progressing     Problem: SAFETY ADULT  Goal: Maintain or return to baseline ADL function  Description  INTERVENTIONS:  -  Assess patient's ability to carry out ADLs; assess patient's baseline for ADL function and identify physical deficits which impact ability to perform ADLs (bathing, care of mouth/teeth, toileting, grooming, dressing, etc )  - Assess/evaluate cause of self-care deficits   - Assess range of motion  - Assess patient's mobility; develop plan if impaired  - Assess patient's need for assistive devices and provide as appropriate  - Encourage maximum independence but intervene and supervise when necessary  - Involve family in performance of ADLs  - Assess for home care needs following discharge   - Consider OT consult to assist with ADL evaluation and planning for discharge  - Provide patient education as appropriate  Outcome: Progressing  Goal: Maintain or return mobility status to optimal level  Description  INTERVENTIONS:  - Assess patient's baseline mobility status (ambulation, transfers, stairs, etc )    - Identify cognitive and physical deficits and behaviors that affect mobility  - Identify mobility aids required to assist with transfers and/or ambulation (gait belt, sit-to-stand, lift, walker, cane, etc )  - [place] fall precautions as indicated by assessment  - Record patient progress and toleration of activity level on Mobility SBAR; progress patient to next Phase/Stage  - Instruct patient to call for assistance with activity based on assessment  - Consider rehabilitation consult to assist with strengthening/weightbearing, etc   Outcome: Progressing     Problem: DISCHARGE PLANNING  Goal: Discharge to home or other facility with appropriate resources  Description  INTERVENTIONS:  - Identify barriers to discharge w/patient and caregiver  - Arrange for needed discharge resources and transportation as appropriate  - Identify discharge learning needs (meds, wound care, etc )  - Arrange for interpretive services to assist at discharge as needed  - Refer to Case Management Department for coordinating discharge planning if the patient needs post-hospital services based on physician/advanced practitioner order or complex needs related to functional status, cognitive ability, or social support system  Outcome: Progressing     Problem: Knowledge Deficit  Goal: Patient/family/caregiver demonstrates understanding of disease process, treatment plan, medications, and discharge instructions  Description  Complete learning assessment and assess knowledge base    Interventions:  - Provide teaching at level of understanding  - Provide teaching via preferred learning methods  Outcome: Progressing     Problem: GASTROINTESTINAL - ADULT  Goal: Minimal or absence of nausea and/or vomiting  Description  INTERVENTIONS:  - Administer IV fluids if ordered to ensure adequate hydration    - Administer ordered antiemetic medications as needed  - Provide nonpharmacologic comfort measures as appropriate  - Consider nutrition services referral to assist patient with adequate nutrition and appropriate food choices   Outcome: Progressing  Goal: Maintains or returns to baseline bowel function  Description  INTERVENTIONS:  - Assess bowel function  - Encourage oral fluids to ensure adequate hydration  - Administer IV fluids if ordered to ensure adequate hydration  - Administer ordered medications as needed  - Encourage mobilization and activity  - Consider nutritional services referral to assist patient with adequate nutrition and appropriate food choices  Outcome: Progressing  Goal: Maintains adequate nutritional intake  Description  INTERVENTIONS:  - Monitor percentage of each meal consumed  - Identify factors contributing to decreased intake, treat as appropriate  - Assist with meals as needed  - Monitor I&O, weight, and lab values if indicated  - Obtain nutrition services referral as needed  Outcome: Progressing

## 2019-11-06 NOTE — PHYSICAL THERAPY NOTE
Physical Therapy Evaluation     Patient's Name: Umesh Jimenez    Admitting Diagnosis  Diarrhea [R19 7]  Colitis [K52 9]  Diarrhea, unspecified type [R19 7]    Problem List  Patient Active Problem List   Diagnosis    COPD [facility]chronic obstructive pulmonary disease) ([facility] [address] [facility]    Cardiac disease    Diverticulosis of intestine with bleeding    Diarrhea    Colorectal polyps    Effusion of bursa of left elbow    Cardiomegaly    Chronic anxiety    Chronic cystitis    Chronic low back pain    Congestive heart failure ([facility] [address] )    Deviated nasal septum    Diverticulosis of colon    Gastroesophageal reflux disease without esophagitis    Gout    History of angioedema    History of colonic polyps    Lumbar radiculopathy    Macular degeneration of both eyes    Obesity    Osteoporosis    Other specified forms of chronic ischemic heart disease    Seasonal allergies    Panic attack    Vocal cord dysfunction    Colitis presumed infectious    Atrial fibrillation with rapid ventricular response (HCC)    Left elbow pain    Chronic respiratory failure with hypoxia ([facility] [address] [facility]    Debility    Positive culture findings in sputum       Past Medical History  Past Medical History:   Diagnosis Date    Asthma     Atrial fibrillation     Blurred vision     Cardiac disease     Colitis     COPD (chronic obstructive pulmonary disease)     Diverticulosis     DJD (degenerative joint disease)     Emphysema lung     Gait abnormality        Past Surgical History  Past Surgical History:   Procedure Laterality Date    BLADDER SURGERY      COLON SURGERY      COLONOSCOPY W/ POLYPECTOMY N/A 1/21/2019    Procedure: COLONOSCOPY W/ POLYPECTOMY;  Surgeon: Gary Starks MD;  Location: [address] [place] [address] GI LAB;   Service: General    SMALL INTESTINE SURGERY          11/06/19 0804   Note Type   Note type Eval/Treat   Pain Assessment   Pain Assessment No/denies pain   Pain Score No Pain   Home Living   Type of [address] [place] [address] level;Performs ADLs on one level; Able to live on main level with bedroom/bathroom;Stairs to enter with rails  (3 [address])   Bathroom Shower/Tub Tub/shower unit   Bathroom Toilet Standard   Bathroom Equipment Grab bars in shower;Grab bars around toilet   [facility]  (utilizes RW at baseline )   Prior Function   Level of [place] Independent with ADLs and functional mobility; Needs assistance with IADLs   Lives With Alone   Receives Help From Friend(s); Home health   ADL Assistance Independent   IADLs Needs assistance  (friend assists with transportation)   Falls in the last 6 months 0   Vocational Full time employment   Restrictions/Precautions   Weight Bearing Precautions Per Order No   Other Precautions O2;Multiple lines; Fall Risk; Chair Alarm   General   Family/Caregiver Present No   Cognition   Overall Cognitive Status [facility]   Arousal/Participation Alert   Attention Attends with cues to redirect   Orientation Level Oriented X4   Memory Decreased short term memory   Following Commands Follows one step commands without difficulty   Comments pt agreeable to PT session   RUE Assessment   RUE Assessment WFL  (grossly 4-/5 MMT)   LUE Assessment   LUE Assessment WFL  (grossly 4-/5 MMT)   RLE Assessment   RLE Assessment WFL  (grossly 4-/5 MMT)   LLE Assessment   LLE Assessment WFL  (grossly 4-/5 MMT)   Coordination   Movements are Fluid and Coordinated 1   Sensation WFL  (pt denies N/T)   Bed Mobility   Additional Comments Pt OOB and seated in chair prior to eval and at end of session    Transfers   Sit to Stand 4  Minimal assistance  (CGA for safety)   Additional items Assist x 1; Armrests; Verbal cues   Stand to Sit 4  Minimal assistance  (CGA for safety)   Additional items Assist x 1; Armrests; Verbal cues   Ambulation/Elevation   Gait pattern Improper Weight shift;Decreased foot clearance;Shuffling; Short stride   Gait Assistance 5  Supervision  (SBA)   Additional items Assist x 1;Verbal cues   Assistive Device Rolling walker   Distance 4 ft sidestepping   Stair Management Assistance Not tested   Balance   Static Sitting Good   Dynamic Sitting Fair +   Static Standing Fair +   Dynamic Standing Fair   Ambulatory Fair   Activity Tolerance   Activity Tolerance Other (Comment)  (pt declination )   Nurse Made Aware TATUM Chirinos verbalized pt appropriate for therapy    Assessment   Prognosis Good   Problem List Decreased strength;Decreased endurance; Impaired balance;Decreased safety awareness;Pain   Assessment Pt is 82 y o  female seen for PT evaluation on 11/6/2019 s/p admit to [facility] on 11/5/2019 w/ diarrhea, colitis  PT consulted to assess pt's functional mobility and d/c needs  Order placed for PT eval and tx  Performed at least 2 patient identifiers during session: Name and wristband  Comorbidities affecting pt's physical performance at time of assessment include: COPD, cardiac disease, diarrhea, cardiomegaly, chronic anxiety, CHF, h/o gout, lumbar radiculopathy, macular degeneration, obesity, osteoporosis, AFib, debility  PTA, pt was independent w/ all functional mobility w/ RW, ambulates household distances, lives alone in 1 level home c 3 SAVANNAH and retired  Personal factors affecting pt at time of IE include: ambulating w/ assistive device, stairs to enter home, inability to navigate level surfaces w/o external assistance, unable to perform dynamic tasks in community, decreased initiation and engagement, unable to perform physical activity, limited insight into impairments, inability to perform IADLs and inability to perform ADLs  Please find objective findings from PT assessment regarding body systems outlined above with impairments and limitations including weakness, impaired balance, decreased activity tolerance, decreased functional mobility tolerance, decreased safety awareness and fall risk, as well as mobility assessment (need for cueing for mobility technique)   The following objective measures performed on IE also reveal limitations: Barthel Index: 55/100  Pt's clinical presentation is currently unstable/unpredictable seen in pt's presentation of need for input for task focus and mobility technique and ongoing medical assessment  Pt to benefit from continued PT tx to address deficits as defined above and maximize level of functional independent mobility and consistency  From PT/mobility standpoint, recommendation at time of d/c would be Home PT pending progress in order to facilitate return to PLOF  Barriers to Discharge Decreased caregiver support  (pt lives alone)   Goals   Patient Goals "to return home"   [facility] Expiration Date 11/16/19   Short Term Goal #1 1 )Patient will complete bed mobility modified I  for decrease need for caregiver assistance, decrease burden of care  2 ) Patient will complete transfers modified I to decrease risk of falls, facilitate upright standing posture  3 ) BLE strength to greater than/equal to 4/5 gross musculature to increase ability to safely transfer, control descent to chair  4 ) Patient will exhibit increase dynamic standing balance to Good, for 1-3 minutes  without LOB modified I to improve activity tolerance & endurance  5 ) Patient will exhibit increase dynamic ambulatory balance to Good for 100-200 feet w/ AD prn,distant supervision of 1 to improve ability to mobilize to toilet, chair and decrease risk for additional medical complications  6 ) Patient will exhibit good self monitoring and ability to follow 2 step commands to increase complexity of tasks and resume ADL's without LOB   PT Treatment Day 1   Plan   Treatment/Interventions Functional transfer training;LE strengthening/ROM; Therapeutic exercise; Endurance training;Patient/family training;Equipment eval/education; Bed mobility;Gait training;Spoke to nursing;Spoke to case management   PT Frequency   (3-5x/wk)   Recommendation   Recommendation Home PT  (anticipated, pending progress)   Equipment Recommended Walker  (continue RW use)   Modified Wallace Scale   Modified Katie Scale 3   Barthel Index   Feeding 10   Bathing 0   Grooming Score 5   Dressing Score 5   Bladder Score 10   Bowels Score 10   Toilet Use Score 5   Transfers (Bed/Chair) Score 10   Mobility (Level Surface) Score 0   Stairs Score 0   Barthel Index Score 55     Physical Therapy Treatment Note  Time In: 0804  Time Out: 0812  Total Time: 8 min     S:  Pt agreeable to PT treatment session s/p PT eval, in no apparent distress and responsive      O:  Pt seen for PT treatment session this date with interventions consisting of therapeutic activity consisting of training: sit<>stand transfers, static standing tolerance for 2-3 minutes w/ B UE support and vc and tactile cues for static standing posture faciliation  No pain reported throughout  VC required for technique      A:  Post session: pt returned back to recliner, chair alarm engaged, all needs in reach and RN notified of session findings/recommendations      P:  Continue to recommend Home PT at time of d/c in order to maximize pt's functional independence and safety w/ mobility  Pt continues to be functioning below baseline level, and remains limited 2* factors listed above  PT will continue to see pt while here in order to address the deficits listed above and provide interventions consistent w/ POC in effort to achieve STGs      Wilian Rico PT

## 2019-11-06 NOTE — UTILIZATION REVIEW
Initial Clinical Review    Admission: Date/Time/Statement: 11/5 @ [facility] This Encounter   Procedures    Place in Observation (expected length of stay for this patient is less than two midnights)     Standing Status:   Standing     Number of Occurrences:   1     Order Specific Question:   Admitting Physician     Answer:   Corinne Amber [[id number]]     Order Specific Question:   Level of Care     Answer:   Med Surg [[id number]]     ED Arrival Information     Expected Arrival Acuity Means of Arrival Escorted By Service Admission Type    - 11/5/2019 14:46 Urgent Ambulance [address] [facility] General Medicine Urgent    Arrival Complaint    diarrhea        Chief Complaint   Patient presents with    Diarrhea     Assessment/Plan: 83 y/o female presents to ED from home by EMS with diarrhea starting this morning  Reports 9-10 episodes of loose watery stool  Recent hospitalization with treatment for colitis, discharged yesterday  Admitted as observation   1  Chronic diarrhea with possible colitis of unknown etiology   All recent workup was found to be negative for any evidence of infection  Dr Neel Gilbert seen the patient in the previous admission 3-4 days ago  Tr Faviola thinks it is a possible medication induced versus microscopic colitis  Tr Givens has recommended repeat colonoscopy with biopsies on outpatient basis   I will re-consult GI for possible colonoscopy and biopsy on ointment patient basis  Patient is on stool bulking agent Metamucil   I have also put the patient on lactose-free diet  I will order celiac disease antibody profile  Patient may gets Zofran on as needed basis  2  Dehydration  Patient has been put on IV fluids normal saline with potassium chloride at 100 cc an hour   3   Generalized weakness   I will consult PT OT for evaluation and treatment     4  Cardiac with history of hypertension, atrial fibrillation   Patient will be continued on Eliquis, diltiazem, Lasix metoprolol and potassium chloride  5  COPD   Patient is getting Breo Ellipta, Combivent, albuterol, ipratropium nebs and montelukast along with oxygen on as-needed basis via nasal cannula  6  Osteoporosis   Patient is on alendronate along with calcium and vitamin-D   7  DJD/osteoarthritis with the right thigh/leg pain   Patient has been put on Voltaren gel along with heating pad over the leg   Patient may get Tylenol on as needed basis    8  GERD   Patient is on Protonix       ED Triage Vitals   Temperature Pulse Respirations Blood Pressure SpO2   11/05/19 1500 11/05/19 1500 11/05/19 1500 11/05/19 1500 11/05/19 1500   (!) 96 3 °F (35 7 °C) 84 16 125/61 93 %      Temp Source Heart Rate Source Patient Position - Orthostatic VS BP Location FiO2 (%)   11/05/19 1500 11/05/19 1622 11/05/19 1622 11/05/19 1622 --   Temporal Monitor Lying Left arm       Pain Score       11/05/19 1500       No Pain        Wt Readings from Last 1 Encounters:   11/05/19 70 5 kg (155 lb 6 8 oz)     Additional Vital Signs:   11/06/19 0741  97 5 °F (36 4 °C)  82  20  122/67  100 %  Nasal cannula  Lying   11/05/19 2314  97 1 °F (36 2 °C)Abnormal   81  20  102/53  94 %  Nasal cannula  Lying   11/05/19 2136  98 3 °F (36 8 °C)  80  20  121/60  97 %  Nasal cannula     11/05/19 1942            Nasal cannula     11/05/19 1735            Nasal cannula     11/05/19 1651  99 5 °F (37 5 °C)  92  18  113/57  96 %  Nasal cannula  Lying   11/05/19 1622  98 °F (36 7 °C)  88  16  120/66  94 %  Nasal cannula         Pertinent Labs/Diagnostic Test Results:   Results from last 7 days   Lab Units 11/06/19  0511 11/05/19  1523 11/02/19  0556 11/01/19 2014   WBC Thousand/uL 3 90* 5 50 4 40* 5 60   HEMOGLOBIN g/dL 9 3* 10 9* 9 2* 10 5*   HEMATOCRIT % 29 6* 34 4* 29 6* 32 9*   PLATELETS Thousands/uL 215 246 187 225   NEUTROS ABS Thousands/µL 2 30 3 60 2 90 3 40         Results from last 7 days   Lab Units 11/06/19  0511 11/05/19  1523 11/03/19  1449 11/02/19  5931 11/02/19  0556 11/01/19 2014   SODIUM mmol/L 139 138 136  --  138 142   POTASSIUM mmol/L 3 7 3 5 3 3*  --  3 6 3 6   CHLORIDE mmol/L 107 101 103  --  107 106   CO2 mmol/L 26 30 27  --  26 28   ANION GAP mmol/L 6 7 6  --  5 8   BUN mg/dL 5* 11 6*  --  6* 10   CREATININE mg/dL 0 42* 0 60 0 52*  --  0 42* 0 52*   EGFR ml/min/1 73sq m 96 85 89  --  96 89   CALCIUM mg/dL 8 6 9 1 9 1  --  8 0* 8 9   MAGNESIUM mg/dL  --   --   --  2 0  --   --      Results from last 7 days   Lab Units 11/06/19  0511 11/05/19  1523 11/03/19  1449 11/02/19 0556 11/01/19 2014   AST U/L 12* 15 13 14 17   ALT U/L 17 21 22 24 28   ALK PHOS U/L 85 107 98 85 94   TOTAL PROTEIN g/dL 5 5* 6 1* 5 9* 5 4* 6 3*   ALBUMIN g/dL 3 1* 3 6 3 5 3 2* 3 7   TOTAL BILIRUBIN mg/dL 0 30 0 40 0 30 0 30 0 40     Results from last 7 days   Lab Units 11/04/19  0624   POC GLUCOSE mg/dl 119     Results from last 7 days   Lab Units 11/06/19  0511 11/05/19  1523 11/03/19  1449 11/02/19 0556 11/01/19 2014   GLUCOSE RANDOM mg/dL 106* 115* 117* 107* 116*             No results found for: BETA-HYDROXYBUTYRATE                                                   Results from last 7 days   Lab Units 11/03/19  1449   BNP pg/mL 100                                         Results from last 7 days   Lab Units 11/01/19  2101   C DIFF TOXIN B  NEGATIVE for C difficle toxin by PCR        Results from last 7 days   Lab Units 11/01/19  2101   SALMONELLA SP PCR  None Detected   SHIGELLA SP/ENTEROINVASIVE E  COLI (EIEC)  None Detected   CAMPYLOBACTER SP (JEJUNI AND COLI)  None Detected   SHIGA TOXIN 1/SHIGA TOXIN 2  None Detected         ED Treatment:   Medication Administration from 11/05/2019 1446 to 11/05/2019 1626       Date/Time Order Dose Route Action Action by Comments     11/05/2019 1525 sodium chloride 0 9 % bolus 1,000 mL 1,000 mL Intravenous New Bag Ayush Gonzalez RN      11/05/2019 1615 potassium chloride (K-DUR,KLOR-CON) CR tablet 40 mEq 40 mEq Oral Given Ayush ANDRADE Kelly Cid RN         Past Medical History:   Diagnosis Date    Asthma     Atrial fibrillation     Blurred vision     Cardiac disease     Colitis     COPD (chronic obstructive pulmonary disease)     Diverticulosis     DJD (degenerative joint disease)     Emphysema lung     Gait abnormality      Present on Admission:  **None**      Admitting Diagnosis: Diarrhea [R19 7]  Colitis [K52 9]  Diarrhea, unspecified type [R19 7]  Age/Sex: 82 y o  female  Admission Orders:  Scheduled Medications:    Medications:  alendronate 70 mg Oral Q7 Days   apixaban 5 mg Oral BID   calcium carbonate-vitamin D 1 tablet Oral BID With Meals   diltiazem 240 mg Oral Daily   metoprolol tartrate 12 5 mg Oral Q12H CONG   montelukast 10 mg Oral HS   pantoprazole 40 mg Oral Early Morning   potassium chloride 20 mEq Oral Daily   psyllium 1 packet Oral TID     Continuous IV Infusions:    sodium chloride 0 9 % with KCl 20 mEq/L 100 mL/hr Intravenous Continuous     PRN Meds:    acetaminophen 488 mg Oral Q8H PRN   albuterol 2 puff Inhalation Q4H PRN   ALPRAZolam 0 5 mg Oral HS PRN   ondansetron 4 mg Intravenous Q6H PRN       IP CONSULT TO GASTROENTEROLOGY   Celiac profile  Lactose restricted diet  PT/OT    Network Utilization Review Department  [email or web address]  ATTENTION: Please call with any questions or concerns to [phone] and carefully listen to the prompts so that you are directed to the right person  All voicemails are confidential   Quin Gallo all requests for admission clinical reviews, approved or denied determinations and any other requests to dedicated fax number below belonging to the campus where the patient is receiving treatment    FACILITY NAME UR FAX NUMBER   ADMISSION DENIALS (Administrative/Medical Necessity) [phone]   PARENT CHILD HEALTH (Maternity/NICU/Pediatrics) [phone]   [facility] [address] [facility] [address] [facility] [phone]   [facility] [phone]   [facility] [address] [phone]

## 2019-11-06 NOTE — PLAN OF CARE
Problem: PHYSICAL THERAPY ADULT  Goal: Performs mobility at highest level of function for planned discharge setting  See evaluation for individualized goals  Description  Treatment/Interventions: Functional transfer training, LE strengthening/ROM, Therapeutic exercise, Endurance training, Patient/family training, Equipment eval/education, Bed mobility, Gait training, Spoke to nursing, Spoke to case management  Equipment Recommended: Walker(continue RW use)       See flowsheet documentation for full assessment, interventions and recommendations  Note:   Prognosis: Good  Problem List: Decreased strength, Decreased endurance, Impaired balance, Decreased safety awareness, Pain  Assessment: Pt is 82 y o  female seen for PT evaluation on 11/6/2019 s/p admit to [address] [facility] on 11/5/2019 w/ diarrhea, colitis  PT consulted to assess pt's functional mobility and d/c needs  Order placed for PT eval and tx  Performed at least 2 patient identifiers during session: Name and wristband  Comorbidities affecting pt's physical performance at time of assessment include: COPD, cardiac disease, diarrhea, cardiomegaly, chronic anxiety, CHF, h/o gout, lumbar radiculopathy, macular degeneration, obesity, osteoporosis, AFib, debility  PTA, pt was independent w/ all functional mobility w/ RW, ambulates household distances, lives alone in 1 level home c 3 [address] and retired  Personal factors affecting pt at time of IE include: ambulating w/ assistive device, stairs to enter home, inability to navigate level surfaces w/o external assistance, unable to perform dynamic tasks in community, decreased initiation and engagement, unable to perform physical activity, limited insight into impairments, inability to perform IADLs and inability to perform ADLs   Please find objective findings from PT assessment regarding body systems outlined above with impairments and limitations including weakness, impaired balance, decreased activity tolerance, decreased functional mobility tolerance, decreased safety awareness and fall risk, as well as mobility assessment (need for cueing for mobility technique)  The following objective measures performed on IE also reveal limitations: Barthel Index: 55/100  Pt's clinical presentation is currently unstable/unpredictable seen in pt's presentation of need for input for task focus and mobility technique and ongoing medical assessment  Pt to benefit from continued PT tx to address deficits as defined above and maximize level of functional independent mobility and consistency  From PT/mobility standpoint, recommendation at time of d/c would be Home PT pending progress in order to facilitate return to PLOF  Barriers to Discharge: Decreased caregiver support(pt lives alone)     Recommendation: Home PT(anticipated, pending progress)          See flowsheet documentation for full assessment

## 2019-11-06 NOTE — SOCIAL WORK
I was able to reach Rodney Castro at 14:50pm and I di make her aware of all the hospital stays, pt is not allowing anyone to talk to her daughter including her grand daughter, I did make her aware of the d/c options that I have offered the pt and she is refusing, I  Did make her aware the the pt is having trouble living on her own and if her friends stop helping her she will not have the support that she needs

## 2019-11-06 NOTE — OCCUPATIONAL THERAPY NOTE
Occupational Therapy Evaluation      Hannah Newman    11/6/2019    Patient Active Problem List   Diagnosis    COPD (chronic obstructive pulmonary disease) ([address] )    Cardiac disease    Diverticulosis of intestine with bleeding    Diarrhea    Colorectal polyps    Effusion of bursa of left elbow    Cardiomegaly    Chronic anxiety    Chronic cystitis    Chronic low back pain    Congestive heart failure (HCC)    Deviated nasal septum    Diverticulosis of colon    Gastroesophageal reflux disease without esophagitis    Gout    History of angioedema    History of colonic polyps    Lumbar radiculopathy    Macular degeneration of both eyes    Obesity    Osteoporosis    Other specified forms of chronic ischemic heart disease    Seasonal allergies    Panic attack    Vocal cord dysfunction    Colitis presumed infectious    Atrial fibrillation with rapid ventricular response (HCC)    Left elbow pain    Chronic respiratory failure with hypoxia (HCC)    Debility    Positive culture findings in sputum       Past Medical History:   Diagnosis Date    Asthma     Atrial fibrillation     Blurred vision     Cardiac disease     Colitis     COPD (chronic obstructive pulmonary disease)     Diverticulosis     DJD (degenerative joint disease)     Emphysema lung     Gait abnormality        Past Surgical History:   Procedure Laterality Date    BLADDER SURGERY      COLON SURGERY      COLONOSCOPY W/ POLYPECTOMY N/A 1/21/2019    Procedure: COLONOSCOPY W/ POLYPECTOMY;  Surgeon: Parag Quispe MD;  Location: [address] [place] [address] GI LAB; Service: General    SMALL INTESTINE SURGERY          11/06/19 0811   Note Type   Note type Eval only   Restrictions/Precautions   Weight Bearing Precautions Per Order No   Other Precautions O2;Multiple lines; Fall Risk;Pain   Pain Assessment   Pain Assessment No/denies pain   Pain Score No Pain   Home Living   Type of [address] [place] [address] Ave One level;Performs ADLs on one level; Able to live on main level with bedroom/bathroom;Stairs to enter with rails  (3 [address]NAH)   Bathroom Shower/Tub Tub/shower unit   Bathroom Toilet Standard   Bathroom Equipment Grab bars in shower;Grab bars around toilet   [facility]  (utilizes RW at baseline )   Prior Function   Level of [place] Independent with ADLs and functional mobility; Needs assistance with IADLs   Lives With Alone   Receives Help From Friend(s)   ADL Assistance Independent   IADLs Needs assistance  (friend assists with transportation)   Falls in the last 6 months 0   Vocational Full time employment   Lifestyle   Autonomy Patient reporting being independent with ADLs, ambulatory with RW, and lives alone in one story house with 3 SAVANNAH   Reciprocal Relationships Friends    Service to Others works as a     ADL   [address] 6  Modified independent   [address] [address]  [address] [address] [facility] [address]  [address] [place] [address] Dr [address]  [address] 5  [address] [facility] [address] 4  [address] [facility]\""  4  [address] [facility] and seated in chair prior to eval and at end of session    Transfers   Sit to Stand   (CGA)   Additional items Assist x 1; Armrests; Verbal cues; Impulsive   Stand to Sit   (CGA)   Additional items Assist x 1; Armrests; Verbal cues; Impulsive   Functional Mobility   Additional Comments Further functional mobility was declined at this time as pt stated she was too hungry    Balance   Static Sitting Good   Dynamic Sitting Fair +   Static Standing Fair +   Dynamic Standing Fair   Activity Tolerance   Activity Tolerance Other (Comment)  (pt declination )   Medical Staff Made Aware JESSICA Dickinson made aware of outcomes/recs    Nurse Made Aware RN [facility] verbalized pt appropriate for therapy    RUE Assessment   RUE Assessment WFL  (grossly 4-/5 MMT)   LUE Assessment   LUE Assessment WFL  (grossly 4-/5 MMT)   Hand Function   Gross Motor Coordination Functional   Fine Motor Coordination Functional   Cognition   Overall Cognitive Status WFL   Arousal/Participation Alert; Responsive   Attention Attends with cues to redirect   Orientation Level Oriented X4   Memory Decreased short term memory   Following Commands Follows one step commands without difficulty   Comments Mini-Cog assessment performed  Version 1 was given  Patient able to recall 1/3 words  Patient able to draw an abnormal clock with the incorrect time  Total score of test was 1/5 indicating  further screening of cognition is recommended  Cognition Assessment Tools   (Mini-cog )   Score 1   Assessment   Limitation Decreased ADL status; Decreased UE strength;Decreased Safe judgement during ADL;Decreased cognition;Decreased endurance;Decreased self-care trans;Decreased high-level ADLs   Prognosis Good   Assessment Pt is a 82 y o  female who was admitted to [address] [place] [address] on 11/5/2019 with Diarrhea and Colitis  At this time, patient is also affected by the comorbidities of: COPD, cardiac disease, effusion of bursa of L elbow, anxiety, chronic low back pain, CHF, GERD, gout, macular degeneration of B eyes, and panic attack  Additionally, patient is affected by the following personal factors:steps to enter environment, limited home support, difficulty performing ADLS and difficulty performing IADLS   Orders placed for OT evaluation/treatment with activity as tolerated orders  Prior to admission, Patient reporting being independent with ADLs, ambulatory with RW, and lives alone in one story house with 3 SAVANNAH  Upon OT evaluation, patient requires supervision/set-up for UB ADLs and minimum assist for LB ADLs   Occupational performance is affected by the following deficits: decreased strength, decreased balance, decreased tolerance, impaired memory, impaired sequencing, impaired problem solving, impulsivity and decreased safety awareness  Based on the following information, patient would benefit from continued skilled OT treatment while in the hospital to address deficits and maximize level of functional independence with ADL's and functional mobility  Occupational Performance areas to address include: grooming, bathing/shower, toilet hygiene, dressing, medication management, functional mobility, clothing management, meal prep and household maintenance  From OT standpoint, recommendation at time of d/c would be home OT  Goals   Patient Goals to go home    Plan   Treatment Interventions ADL retraining;Functional transfer training;UE strengthening/ROM; Cognitive reorientation; Endurance training; Compensatory technique education; Energy conservation   Goal Expiration Date 11/16/19   OT Treatment Day 0   OT Frequency 3-5x/wk   Recommendation   OT Discharge Recommendation Home OT   OT - OK to Discharge Yes  (Once medically cleared)   Barthel Index   Feeding 10   Bathing 0   Grooming Score 5   Dressing Score 5   Bladder Score 10   Bowels Score 10   Toilet Use Score 5   Transfers (Bed/Chair) Score 10   Mobility (Level Surface) Score 0   Stairs Score 0   Barthel Index Score 55     GOALS:    *ADL transfers with (I) for inc'd independence with ADLs/purposeful tasks    *UB ADL with (I) for inc'd independence with self cares    *LB ADL with (I) using AE prn for inc'd independence with self cares    *Toileting with (I) for clothing management and hygiene for return to PLOF with personal care    *Increase static stand balance to good and dyn stand balance to fair+ for inc'd safety with standing purposeful tasks    *Increase stand tolerance x10 m for inc'd tolerance with standing purposeful tasks    *Participate in 10m UE therex to increase overall stamina/activity tolerance for purposeful tasks    *Bed mobility- (I) for inc'd independence to manage own comfort and initiate EOB & OOB purposeful tasks    *Patient will verbalize and demonstrate use of energy conservation/deep breathing techniques and work simplification skills during functional activities with no verbal cues  *Patient will increase OOB/sitting tolerance to 2-4 hours per day to increase participation in self-care and leisure tasks with no s/s of exertion  *Patient will engage in ongoing cognitive assessment to assist with safe discharge planning/recommendations       Teretha Severe, MS, OTR/L

## 2019-11-06 NOTE — SOCIAL WORK
Chart reviewed by case management, assessment was completed at the bedside, pt is aware that she is here as an obs status and obs booklet was given ,I did ask the pt if she would like me to call Chucho Palmer at the [facility] to see if she has an opening and I was told she wants to stay in her home until she dies, I made a new referral to [facility] , pt came to the hospital before she was able to be opened by Kaleb Geronimo, I placed a call to Ara Romero her poa @ 12:25 pm to # [phone] and left a message for her to call to discuss d/c plan and needs, I then called the area of aging to see about the wavier program for this pt, I was given the # for [facility] [phone] and I spoke with Vanessa Landry and started the application process and the I was instructed to have the pt call them to give them permission to continue with the process, I went to the pt's room and dialed the # for her and she did speak with the agent from the [facility], the pt instructed me to call her grand daughter that lives out of state, I called Sofia Glasgow @ 13:30 pm to # [phone] and left a mesage for her to call me to discuss d/c plan, pt lives alone in a 1 story home, 3 steps outside, pt works as a   But has not been able to work due to being in the hospital, pt has oxygen from [employer] care 2 liters  I was told she does have a portable concentrator, Clifton Zheng, pt depends upon her friends to drive her, shop, pt stated she needs help cleaning and a referral was started with [facility], I spoke with the doctor and he is not planning to send her home, pt keeps coming back to the hospital and he wants her evaluated by GI, I received a call from Ara Romero @14:15 pm, she does help the pt ,but it  is getting hard for her to meet this pt's needs, no d/c today as discussed at care coordination rounds, John Chapa ai s awar of possible d/c tomorrow, cm will continue to follow Patient/caregiver received discharge checklist   Content reviewed  Patient/caregiver encouraged to participate in discharge plan of care prior to discharge home  CM reviewed d/c planning process including the following: identifying help at home, patient preference for d/c planning needs, availability of treatment team to discuss questions or concerns patient and/or family may have regarding understanding medications and recognizing signs and symptoms once discharged  CM also encouraged patient to follow up with all recommended appointments after discharge  Patient advised of importance for patient and family to participate in managing patients medical well being

## 2019-11-07 ENCOUNTER — ANESTHESIA (INPATIENT)
Dept: GASTROENTEROLOGY | Facility: HOSPITAL | Age: 84
DRG: 641 | End: 2019-11-07
Payer: MEDICARE

## 2019-11-07 ENCOUNTER — ANESTHESIA EVENT (INPATIENT)
Dept: GASTROENTEROLOGY | Facility: HOSPITAL | Age: 84
DRG: 641 | End: 2019-11-07
Payer: MEDICARE

## 2019-11-07 ENCOUNTER — APPOINTMENT (INPATIENT)
Dept: GASTROENTEROLOGY | Facility: HOSPITAL | Age: 84
DRG: 641 | End: 2019-11-07
Attending: INTERNAL MEDICINE
Payer: MEDICARE

## 2019-11-07 LAB
ENDOMYSIUM IGA SER QL: NEGATIVE
GLIADIN PEPTIDE IGA SER-ACNC: 6 UNITS (ref 0–19)
GLIADIN PEPTIDE IGG SER-ACNC: 4 UNITS (ref 0–19)
IGA SERPL-MCNC: 211 MG/DL (ref 64–422)
TTG IGA SER-ACNC: <2 U/ML (ref 0–3)
TTG IGG SER-ACNC: <2 U/ML (ref 0–5)

## 2019-11-07 PROCEDURE — 0DBN8ZX EXCISION OF SIGMOID COLON, VIA NATURAL OR ARTIFICIAL OPENING ENDOSCOPIC, DIAGNOSTIC: ICD-10-PCS | Performed by: INTERNAL MEDICINE

## 2019-11-07 PROCEDURE — 0DBM8ZX EXCISION OF DESCENDING COLON, VIA NATURAL OR ARTIFICIAL OPENING ENDOSCOPIC, DIAGNOSTIC: ICD-10-PCS | Performed by: INTERNAL MEDICINE

## 2019-11-07 PROCEDURE — 0DBK8ZX EXCISION OF ASCENDING COLON, VIA NATURAL OR ARTIFICIAL OPENING ENDOSCOPIC, DIAGNOSTIC: ICD-10-PCS | Performed by: INTERNAL MEDICINE

## 2019-11-07 PROCEDURE — 94760 N-INVAS EAR/PLS OXIMETRY 1: CPT

## 2019-11-07 PROCEDURE — 88305 TISSUE EXAM BY PATHOLOGIST: CPT | Performed by: PATHOLOGY

## 2019-11-07 PROCEDURE — 94664 DEMO&/EVAL PT USE INHALER: CPT

## 2019-11-07 PROCEDURE — 0DBL8ZX EXCISION OF TRANSVERSE COLON, VIA NATURAL OR ARTIFICIAL OPENING ENDOSCOPIC, DIAGNOSTIC: ICD-10-PCS | Performed by: INTERNAL MEDICINE

## 2019-11-07 PROCEDURE — 97110 THERAPEUTIC EXERCISES: CPT

## 2019-11-07 PROCEDURE — 0DBH8ZX EXCISION OF CECUM, VIA NATURAL OR ARTIFICIAL OPENING ENDOSCOPIC, DIAGNOSTIC: ICD-10-PCS | Performed by: INTERNAL MEDICINE

## 2019-11-07 PROCEDURE — 94640 AIRWAY INHALATION TREATMENT: CPT

## 2019-11-07 RX ORDER — CALCIUM CARBONATE 200(500)MG
500 TABLET,CHEWABLE ORAL EVERY 4 HOURS PRN
Status: DISCONTINUED | OUTPATIENT
Start: 2019-11-07 | End: 2019-11-09

## 2019-11-07 RX ORDER — SODIUM CHLORIDE, SODIUM LACTATE, POTASSIUM CHLORIDE, CALCIUM CHLORIDE 600; 310; 30; 20 MG/100ML; MG/100ML; MG/100ML; MG/100ML
100 INJECTION, SOLUTION INTRAVENOUS CONTINUOUS
Status: DISCONTINUED | OUTPATIENT
Start: 2019-11-07 | End: 2019-11-12

## 2019-11-07 RX ORDER — LIDOCAINE HYDROCHLORIDE 20 MG/ML
INJECTION, SOLUTION EPIDURAL; INFILTRATION; INTRACAUDAL; PERINEURAL AS NEEDED
Status: DISCONTINUED | OUTPATIENT
Start: 2019-11-07 | End: 2019-11-07 | Stop reason: SURG

## 2019-11-07 RX ORDER — PROPOFOL 10 MG/ML
INJECTION, EMULSION INTRAVENOUS AS NEEDED
Status: DISCONTINUED | OUTPATIENT
Start: 2019-11-07 | End: 2019-11-07 | Stop reason: SURG

## 2019-11-07 RX ORDER — SODIUM CHLORIDE, SODIUM LACTATE, POTASSIUM CHLORIDE, CALCIUM CHLORIDE 600; 310; 30; 20 MG/100ML; MG/100ML; MG/100ML; MG/100ML
INJECTION, SOLUTION INTRAVENOUS CONTINUOUS PRN
Status: DISCONTINUED | OUTPATIENT
Start: 2019-11-07 | End: 2019-11-07 | Stop reason: SURG

## 2019-11-07 RX ADMIN — CALCIUM CARBONATE (ANTACID) CHEW TAB 500 MG 500 MG: 500 CHEW TAB at 21:33

## 2019-11-07 RX ADMIN — PROPOFOL 50 MG: 10 INJECTION, EMULSION INTRAVENOUS at 13:42

## 2019-11-07 RX ADMIN — LIDOCAINE HYDROCHLORIDE 100 MG: 20 INJECTION, SOLUTION EPIDURAL; INFILTRATION; INTRACAUDAL; PERINEURAL at 13:30

## 2019-11-07 RX ADMIN — PROPOFOL 20 MG: 10 INJECTION, EMULSION INTRAVENOUS at 13:47

## 2019-11-07 RX ADMIN — IPRATROPIUM BROMIDE AND ALBUTEROL SULFATE 3 ML: 2.5; .5 SOLUTION RESPIRATORY (INHALATION) at 15:03

## 2019-11-07 RX ADMIN — ALPRAZOLAM 0.5 MG: 0.5 TABLET ORAL at 20:33

## 2019-11-07 RX ADMIN — MONTELUKAST SODIUM 10 MG: 10 TABLET, COATED ORAL at 21:33

## 2019-11-07 RX ADMIN — IPRATROPIUM BROMIDE AND ALBUTEROL SULFATE 3 ML: 2.5; .5 SOLUTION RESPIRATORY (INHALATION) at 11:08

## 2019-11-07 RX ADMIN — IPRATROPIUM BROMIDE AND ALBUTEROL SULFATE 3 ML: 2.5; .5 SOLUTION RESPIRATORY (INHALATION) at 07:37

## 2019-11-07 RX ADMIN — PROPOFOL 20 MG: 10 INJECTION, EMULSION INTRAVENOUS at 13:33

## 2019-11-07 RX ADMIN — DILTIAZEM HYDROCHLORIDE 240 MG: 240 CAPSULE, COATED, EXTENDED RELEASE ORAL at 17:50

## 2019-11-07 RX ADMIN — PROPOFOL 60 MG: 10 INJECTION, EMULSION INTRAVENOUS at 13:30

## 2019-11-07 RX ADMIN — PSYLLIUM HUSK 1 PACKET: 3.4 POWDER ORAL at 15:37

## 2019-11-07 RX ADMIN — METOPROLOL TARTRATE 12.5 MG: 25 TABLET ORAL at 20:33

## 2019-11-07 RX ADMIN — POTASSIUM CHLORIDE AND SODIUM CHLORIDE 100 ML/HR: 900; 150 INJECTION, SOLUTION INTRAVENOUS at 15:51

## 2019-11-07 RX ADMIN — IPRATROPIUM BROMIDE AND ALBUTEROL SULFATE 3 ML: 2.5; .5 SOLUTION RESPIRATORY (INHALATION) at 20:17

## 2019-11-07 RX ADMIN — CALCIUM CARBONATE-VITAMIN D TAB 500 MG-200 UNIT 1 TABLET: 500-200 TAB at 15:37

## 2019-11-07 RX ADMIN — POTASSIUM CHLORIDE AND SODIUM CHLORIDE 100 ML/HR: 900; 150 INJECTION, SOLUTION INTRAVENOUS at 11:00

## 2019-11-07 RX ADMIN — PROPOFOL 50 MG: 10 INJECTION, EMULSION INTRAVENOUS at 13:38

## 2019-11-07 RX ADMIN — SODIUM CHLORIDE, SODIUM LACTATE, POTASSIUM CHLORIDE, AND CALCIUM CHLORIDE: .6; .31; .03; .02 INJECTION, SOLUTION INTRAVENOUS at 13:27

## 2019-11-07 RX ADMIN — APIXABAN 5 MG: 5 TABLET, FILM COATED ORAL at 17:44

## 2019-11-07 NOTE — ANESTHESIA POSTPROCEDURE EVALUATION
Post-Op Assessment Note    CV Status:  Stable  Pain Score: 0    Pain management: adequate     Mental Status:  Alert   Hydration Status:  Stable   PONV Controlled:  None   Airway Patency:  Patent   Post Op Vitals Reviewed: Yes      Staff: Anesthesiologist           BP (P) 124/61 (11/07/19 1358)    Temp (P) 98 3 °F (36 8 °C) (11/07/19 1358)    Pulse (P) 88 (11/07/19 1358)   Resp (P) 20 (11/07/19 1358)    SpO2 (P) 97 % (11/07/19 1358)

## 2019-11-07 NOTE — CONSULTS
Consultation - Deric Cabrera 82 y o  female MRN: [id number]  Unit/Bed#: -01 Encounter: [id number]      Assessment/Plan     Assessment/Plan:  1  Intractable diarrhea--patient has been having recurrent diarrhea for last few months  She was seen over the weekend by me for diarrhea and then discharge home after having formed stools once Metamucil was started  Her stool studies have been negative for infectious etiology  She is scheduled for a colonoscopy with possible biopsies to evaluate for microscopic colitis with me  2  Anemia of unclear etiology  Denies overt signs of GI bleed  Monitor H/H  Transfuse PRBC prn to keep Hb >7  Would obtain iron studies, folate, and B12  Thank you for the consultation  Case d/w Dr Marguerite Carmen  History of Present Illness   Physician Requesting Consult: Khadijah Mackay MD  Reason for Consult / Principal Problem: diarrhea  Hx and PE limited by:   HPI: Juanito Phillips is a 82y o  year old female who presents with recurrent diarrhea  Patient has been having diarrhea for last couple of months and progressively worsening  She was admitted to the hospital over the weekend for similar complaints  Her diarrhea had initially improved with adding metamucil and was discharged home to f/up with in GI office for outpatient colonoscopy  However, once she returned home, she started having diarrhea  About 9-10 episodes a day, mostly water  Non-bloody diarrhea  Has had a colonoscopy earlier this year with Dr Radha Mclaughlin and was told she had some polyps  Inpatient consult to gastroenterology  Consult performed by: Reggie Epps MD  Consult ordered by: Khadijah Mackay MD          Review of Systems   Denies associated nausea, vomiting or abdominal pain or blood in stool      Historical Information   Past Medical History:   Diagnosis Date    Asthma     Atrial fibrillation     Blurred vision     Cardiac disease     Colitis     COPD (chronic obstructive pulmonary disease)     Diverticulosis     DJD (degenerative joint disease)     Emphysema lung     Gait abnormality      Past Surgical History:   Procedure Laterality Date    BLADDER SURGERY      COLON SURGERY      COLONOSCOPY W/ POLYPECTOMY N/A 2019    Procedure: COLONOSCOPY W/ POLYPECTOMY;  Surgeon: Judson Irvin MD;  Location: [address] [place] [address] GI LAB; Service: General    SMALL INTESTINE SURGERY       Social History   Social History     Substance and Sexual Activity   Alcohol Use Not Currently    Frequency: Never    Binge frequency: Never     Social History     Substance and Sexual Activity   Drug Use No     Social History     Tobacco Use   Smoking Status Former Smoker    Last attempt to quit: 2013    Years since quittin 9   Smokeless Tobacco Never Used     Family History: non-contributory    Meds/Allergies   all current active meds have been reviewed    Allergies   Allergen Reactions    Fluticasone        Objective   Vitals:    19 1108   BP:    Pulse:    Resp:    Temp:    SpO2: 99%       Intake/Output Summary (Last 24 hours) at 2019 1233  Last data filed at 2019 0854  Gross per 24 hour   Intake 220 ml   Output 1250 ml   Net -1030 ml       Invasive Devices:   Peripheral IV 19 Right;Dorsal (posterior) Forearm (Active)   Site Assessment Positional;Intact 2019  9:00 PM   Dressing Type Transparent 2019  9:00 PM   Line Status Infusing;Flushed 2019  9:00 PM   Dressing Status Clean;Dry; Intact 2019  9:00 PM   Dressing Change Due 19  9:23 AM   Reason Not Rotated Not due 2019  9:23 AM       Physical Exam  Gen: NAD, AAOx3  Heart: RRR, normal S1/ s2  Chest: CTA b/l  Abd: soft, non-tender, non-distended +BS  Ext: no edema    Lab Results: I have personally reviewed pertinent reports  Imaging Studies: CT abd/pelvis from 10/2/19 shows liquid stool within colon suggestive of colitis  Counseling / Coordination of Care  Total floor / unit time spent today 40 minutes  Greater than 50% of total time was spent with the patient and / or family counseling and / or coordination of care  A description of the counseling / coordination of care: Keep NPO for colonoscopy today with possible biopsy to evaluate for colitis  Monitor H/H  Obtain iron studies, folate and B12  Celiac disease panel is pending

## 2019-11-07 NOTE — PLAN OF CARE
Problem: Potential for Falls  Goal: Patient will remain free of falls  Description  INTERVENTIONS:  - Assess patient frequently for physical needs  -  Identify cognitive and physical deficits and behaviors that affect risk of falls    -  [place] fall precautions as indicated by assessment   - Educate patient/family on patient safety including physical limitations  - Instruct patient to call for assistance with activity based on assessment  - Modify environment to reduce risk of injury  - Consider OT/PT consult to assist with strengthening/mobility  Outcome: Progressing     Problem: PAIN - ADULT  Goal: Verbalizes/displays adequate comfort level or baseline comfort level  Description  Interventions:  - Encourage patient to monitor pain and request assistance  - Assess pain using appropriate pain scale  - Administer analgesics based on type and severity of pain and evaluate response  - Implement non-pharmacological measures as appropriate and evaluate response  - Consider cultural and social influences on pain and pain management  - Notify physician/advanced practitioner if interventions unsuccessful or patient reports new pain  Outcome: Progressing     Problem: INFECTION - ADULT  Goal: Absence or prevention of progression during hospitalization  Description  INTERVENTIONS:  - Assess and monitor for signs and symptoms of infection  - Monitor lab/diagnostic results  - Monitor all insertion sites, i e  indwelling lines, tubes, and drains  - Monitor endotracheal if appropriate and nasal secretions for changes in amount and color  - [place] appropriate cooling/warming therapies per order  - Administer medications as ordered  - Instruct and encourage patient and family to use good hand hygiene technique  - Identify and instruct in appropriate isolation precautions for identified infection/condition  Outcome: Progressing  Goal: Absence of fever/infection during neutropenic period  Description  INTERVENTIONS:  - Monitor WBC    Outcome: Progressing     Problem: SAFETY ADULT  Goal: Maintain or return to baseline ADL function  Description  INTERVENTIONS:  -  Assess patient's ability to carry out ADLs; assess patient's baseline for ADL function and identify physical deficits which impact ability to perform ADLs (bathing, care of mouth/teeth, toileting, grooming, dressing, etc )  - Assess/evaluate cause of self-care deficits   - Assess range of motion  - Assess patient's mobility; develop plan if impaired  - Assess patient's need for assistive devices and provide as appropriate  - Encourage maximum independence but intervene and supervise when necessary  - Involve family in performance of ADLs  - Assess for home care needs following discharge   - Consider OT consult to assist with ADL evaluation and planning for discharge  - Provide patient education as appropriate  Outcome: Progressing  Goal: Maintain or return mobility status to optimal level  Description  INTERVENTIONS:  - Assess patient's baseline mobility status (ambulation, transfers, stairs, etc )    - Identify cognitive and physical deficits and behaviors that affect mobility  - Identify mobility aids required to assist with transfers and/or ambulation (gait belt, sit-to-stand, lift, walker, cane, etc )  - [place] fall precautions as indicated by assessment  - Record patient progress and toleration of activity level on Mobility SBAR; progress patient to next Phase/Stage  - Instruct patient to call for assistance with activity based on assessment  - Consider rehabilitation consult to assist with strengthening/weightbearing, etc   Outcome: Progressing     Problem: DISCHARGE PLANNING  Goal: Discharge to home or other facility with appropriate resources  Description  INTERVENTIONS:  - Identify barriers to discharge w/patient and caregiver  - Arrange for needed discharge resources and transportation as appropriate  - Identify discharge learning needs (meds, wound care, etc )  - Arrange for interpretive services to assist at discharge as needed  - Refer to Case Management Department for coordinating discharge planning if the patient needs post-hospital services based on physician/advanced practitioner order or complex needs related to functional status, cognitive ability, or social support system  Outcome: Progressing     Problem: Knowledge Deficit  Goal: Patient/family/caregiver demonstrates understanding of disease process, treatment plan, medications, and discharge instructions  Description  Complete learning assessment and assess knowledge base    Interventions:  - Provide teaching at level of understanding  - Provide teaching via preferred learning methods  Outcome: Progressing     Problem: GASTROINTESTINAL - ADULT  Goal: Minimal or absence of nausea and/or vomiting  Description  INTERVENTIONS:  - Administer IV fluids if ordered to ensure adequate hydration    - Administer ordered antiemetic medications as needed  - Provide nonpharmacologic comfort measures as appropriate  - Consider nutrition services referral to assist patient with adequate nutrition and appropriate food choices   Outcome: Progressing  Goal: Maintains or returns to baseline bowel function  Description  INTERVENTIONS:  - Assess bowel function  - Encourage oral fluids to ensure adequate hydration  - Administer IV fluids if ordered to ensure adequate hydration  - Administer ordered medications as needed  - Encourage mobilization and activity  - Consider nutritional services referral to assist patient with adequate nutrition and appropriate food choices  Outcome: Progressing  Goal: Maintains adequate nutritional intake  Description  INTERVENTIONS:  - Monitor percentage of each meal consumed  - Identify factors contributing to decreased intake, treat as appropriate  - Assist with meals as needed  - Monitor I&O, weight, and lab values if indicated  - Obtain nutrition services referral as needed  Outcome: Progressing Problem: DISCHARGE PLANNING - CARE MANAGEMENT  Goal: Discharge to post-acute care or home with appropriate resources  Description  INTERVENTIONS:  - Conduct assessment to determine patient/family and health care team treatment goals, and need for post-acute services based on payer coverage, community resources, and patient preferences, and barriers to discharge  - Address psychosocial, clinical, and financial barriers to discharge as identified in assessment in conjunction with the patient/family and health care team  - Arrange appropriate level of post-acute services according to patient's   needs and preference and payer coverage in collaboration with the physician and health care team  - Communicate with and update the patient/family, physician, and health care team regarding progress on the discharge plan  - Arrange appropriate transportation to post-acute venues    Pt's goal is to return home with [facility]   Outcome: Progressing

## 2019-11-07 NOTE — PLAN OF CARE
Problem: Potential for Falls  Goal: Patient will remain free of falls  Description  INTERVENTIONS:  - Assess patient frequently for physical needs  -  Identify cognitive and physical deficits and behaviors that affect risk of falls    -  [place] fall precautions as indicated by assessment   - Educate patient/family on patient safety including physical limitations  - Instruct patient to call for assistance with activity based on assessment  - Modify environment to reduce risk of injury  - Consider OT/PT consult to assist with strengthening/mobility  Outcome: Progressing     Problem: PAIN - ADULT  Goal: Verbalizes/displays adequate comfort level or baseline comfort level  Description  Interventions:  - Encourage patient to monitor pain and request assistance  - Assess pain using appropriate pain scale  - Administer analgesics based on type and severity of pain and evaluate response  - Implement non-pharmacological measures as appropriate and evaluate response  - Consider cultural and social influences on pain and pain management  - Notify physician/advanced practitioner if interventions unsuccessful or patient reports new pain  Outcome: Progressing     Problem: INFECTION - ADULT  Goal: Absence or prevention of progression during hospitalization  Description  INTERVENTIONS:  - Assess and monitor for signs and symptoms of infection  - Monitor lab/diagnostic results  - Monitor all insertion sites, i e  indwelling lines, tubes, and drains  - Monitor endotracheal if appropriate and nasal secretions for changes in amount and color  - [place] appropriate cooling/warming therapies per order  - Administer medications as ordered  - Instruct and encourage patient and family to use good hand hygiene technique  - Identify and instruct in appropriate isolation precautions for identified infection/condition  Outcome: Progressing  Goal: Absence of fever/infection during neutropenic period  Description  INTERVENTIONS:  - Monitor WBC    Outcome: Progressing     Problem: SAFETY ADULT  Goal: Maintain or return to baseline ADL function  Description  INTERVENTIONS:  -  Assess patient's ability to carry out ADLs; assess patient's baseline for ADL function and identify physical deficits which impact ability to perform ADLs (bathing, care of mouth/teeth, toileting, grooming, dressing, etc )  - Assess/evaluate cause of self-care deficits   - Assess range of motion  - Assess patient's mobility; develop plan if impaired  - Assess patient's need for assistive devices and provide as appropriate  - Encourage maximum independence but intervene and supervise when necessary  - Involve family in performance of ADLs  - Assess for home care needs following discharge   - Consider OT consult to assist with ADL evaluation and planning for discharge  - Provide patient education as appropriate  Outcome: Progressing  Goal: Maintain or return mobility status to optimal level  Description  INTERVENTIONS:  - Assess patient's baseline mobility status (ambulation, transfers, stairs, etc )    - Identify cognitive and physical deficits and behaviors that affect mobility  - Identify mobility aids required to assist with transfers and/or ambulation (gait belt, sit-to-stand, lift, walker, cane, etc )  - [place] fall precautions as indicated by assessment  - Record patient progress and toleration of activity level on Mobility SBAR; progress patient to next Phase/Stage  - Instruct patient to call for assistance with activity based on assessment  - Consider rehabilitation consult to assist with strengthening/weightbearing, etc   Outcome: Progressing     Problem: DISCHARGE PLANNING  Goal: Discharge to home or other facility with appropriate resources  Description  INTERVENTIONS:  - Identify barriers to discharge w/patient and caregiver  - Arrange for needed discharge resources and transportation as appropriate  - Identify discharge learning needs (meds, wound care, etc )  - Arrange for interpretive services to assist at discharge as needed  - Refer to Case Management Department for coordinating discharge planning if the patient needs post-hospital services based on physician/advanced practitioner order or complex needs related to functional status, cognitive ability, or social support system  Outcome: Progressing     Problem: Knowledge Deficit  Goal: Patient/family/caregiver demonstrates understanding of disease process, treatment plan, medications, and discharge instructions  Description  Complete learning assessment and assess knowledge base    Interventions:  - Provide teaching at level of understanding  - Provide teaching via preferred learning methods  Outcome: Progressing     Problem: GASTROINTESTINAL - ADULT  Goal: Minimal or absence of nausea and/or vomiting  Description  INTERVENTIONS:  - Administer IV fluids if ordered to ensure adequate hydration    - Administer ordered antiemetic medications as needed  - Provide nonpharmacologic comfort measures as appropriate  - Consider nutrition services referral to assist patient with adequate nutrition and appropriate food choices   Outcome: Progressing  Goal: Maintains or returns to baseline bowel function  Description  INTERVENTIONS:  - Assess bowel function  - Encourage oral fluids to ensure adequate hydration  - Administer IV fluids if ordered to ensure adequate hydration  - Administer ordered medications as needed  - Encourage mobilization and activity  - Consider nutritional services referral to assist patient with adequate nutrition and appropriate food choices  Outcome: Progressing  Goal: Maintains adequate nutritional intake  Description  INTERVENTIONS:  - Monitor percentage of each meal consumed  - Identify factors contributing to decreased intake, treat as appropriate  - Assist with meals as needed  - Monitor I&O, weight, and lab values if indicated  - Obtain nutrition services referral as needed  Outcome: Progressing Problem: DISCHARGE PLANNING - CARE MANAGEMENT  Goal: Discharge to post-acute care or home with appropriate resources  Description  INTERVENTIONS:  - Conduct assessment to determine patient/family and health care team treatment goals, and need for post-acute services based on payer coverage, community resources, and patient preferences, and barriers to discharge  - Address psychosocial, clinical, and financial barriers to discharge as identified in assessment in conjunction with the patient/family and health care team  - Arrange appropriate level of post-acute services according to patient's   needs and preference and payer coverage in collaboration with the physician and health care team  - Communicate with and update the patient/family, physician, and health care team regarding progress on the discharge plan  - Arrange appropriate transportation to post-acute venues    Pt's goal is to return home with [facility]   Outcome: Progressing

## 2019-11-07 NOTE — PLAN OF CARE
Problem: Potential for Falls  Goal: Patient will remain free of falls  Description  INTERVENTIONS:  - Assess patient frequently for physical needs  -  Identify cognitive and physical deficits and behaviors that affect risk of falls    -  [place] fall precautions as indicated by assessment   - Educate patient/family on patient safety including physical limitations  - Instruct patient to call for assistance with activity based on assessment  - Modify environment to reduce risk of injury  - Consider OT/PT consult to assist with strengthening/mobility  11/7/2019 1743 by Jay Villegas RN  Outcome: Progressing  11/7/2019 1742 by Jay Villegas RN  Outcome: Progressing     Problem: PAIN - ADULT  Goal: Verbalizes/displays adequate comfort level or baseline comfort level  Description  Interventions:  - Encourage patient to monitor pain and request assistance  - Assess pain using appropriate pain scale  - Administer analgesics based on type and severity of pain and evaluate response  - Implement non-pharmacological measures as appropriate and evaluate response  - Consider cultural and social influences on pain and pain management  - Notify physician/advanced practitioner if interventions unsuccessful or patient reports new pain  11/7/2019 1743 by Jay Villegas RN  Outcome: Progressing  11/7/2019 1742 by Jay Villegas RN  Outcome: Progressing     Problem: INFECTION - ADULT  Goal: Absence or prevention of progression during hospitalization  Description  INTERVENTIONS:  - Assess and monitor for signs and symptoms of infection  - Monitor lab/diagnostic results  - Monitor all insertion sites, i e  indwelling lines, tubes, and drains  - Monitor endotracheal if appropriate and nasal secretions for changes in amount and color  - [place] appropriate cooling/warming therapies per order  - Administer medications as ordered  - Instruct and encourage patient and family to use good hand hygiene technique  - Identify and instruct in appropriate isolation precautions for identified infection/condition  11/7/2019 1743 by Jean Yun RN  Outcome: Progressing  11/7/2019 1742 by Jean Yun RN  Outcome: Progressing  Goal: Absence of fever/infection during neutropenic period  Description  INTERVENTIONS:  - Monitor WBC    11/7/2019 1743 by Jean Yun RN  Outcome: Progressing  11/7/2019 1742 by Jean Yun RN  Outcome: Progressing     Problem: SAFETY ADULT  Goal: Maintain or return to baseline ADL function  Description  INTERVENTIONS:  -  Assess patient's ability to carry out ADLs; assess patient's baseline for ADL function and identify physical deficits which impact ability to perform ADLs (bathing, care of mouth/teeth, toileting, grooming, dressing, etc )  - Assess/evaluate cause of self-care deficits   - Assess range of motion  - Assess patient's mobility; develop plan if impaired  - Assess patient's need for assistive devices and provide as appropriate  - Encourage maximum independence but intervene and supervise when necessary  - Involve family in performance of ADLs  - Assess for home care needs following discharge   - Consider OT consult to assist with ADL evaluation and planning for discharge  - Provide patient education as appropriate  11/7/2019 1743 by Jean Yun RN  Outcome: Progressing  11/7/2019 1742 by Jean Yun RN  Outcome: Progressing  Goal: Maintain or return mobility status to optimal level  Description  INTERVENTIONS:  - Assess patient's baseline mobility status (ambulation, transfers, stairs, etc )    - Identify cognitive and physical deficits and behaviors that affect mobility  - Identify mobility aids required to assist with transfers and/or ambulation (gait belt, sit-to-stand, lift, walker, cane, etc )  - [place] fall precautions as indicated by assessment  - Record patient progress and toleration of activity level on Mobility SBAR; progress patient to next Phase/Stage  - Instruct patient to call for assistance with activity based on assessment  - Consider rehabilitation consult to assist with strengthening/weightbearing, etc   11/7/2019 1743 by Facundo Sheppard RN  Outcome: Progressing  11/7/2019 1742 by Facundo Sheppard RN  Outcome: Progressing     Problem: DISCHARGE PLANNING  Goal: Discharge to home or other facility with appropriate resources  Description  INTERVENTIONS:  - Identify barriers to discharge w/patient and caregiver  - Arrange for needed discharge resources and transportation as appropriate  - Identify discharge learning needs (meds, wound care, etc )  - Arrange for interpretive services to assist at discharge as needed  - Refer to Case Management Department for coordinating discharge planning if the patient needs post-hospital services based on physician/advanced practitioner order or complex needs related to functional status, cognitive ability, or social support system  11/7/2019 1743 by Facundo Sheppard RN  Outcome: Progressing  11/7/2019 1742 by Facundo Sheppard RN  Outcome: Progressing     Problem: Knowledge Deficit  Goal: Patient/family/caregiver demonstrates understanding of disease process, treatment plan, medications, and discharge instructions  Description  Complete learning assessment and assess knowledge base    Interventions:  - Provide teaching at level of understanding  - Provide teaching via preferred learning methods  11/7/2019 1743 by Facundo Sheppard RN  Outcome: Progressing  11/7/2019 1742 by Facundo Sheppard RN  Outcome: Progressing     Problem: GASTROINTESTINAL - ADULT  Goal: Minimal or absence of nausea and/or vomiting  Description  INTERVENTIONS:  - Administer IV fluids if ordered to ensure adequate hydration    - Administer ordered antiemetic medications as needed  - Provide nonpharmacologic comfort measures as appropriate  - Consider nutrition services referral to assist patient with adequate nutrition and appropriate food choices   11/7/2019 1743 by Jv Domínguez RN  Outcome: Progressing  11/7/2019 1742 by Jv Domínguez RN  Outcome: Progressing  Goal: Maintains or returns to baseline bowel function  Description  INTERVENTIONS:  - Assess bowel function  - Encourage oral fluids to ensure adequate hydration  - Administer IV fluids if ordered to ensure adequate hydration  - Administer ordered medications as needed  - Encourage mobilization and activity  - Consider nutritional services referral to assist patient with adequate nutrition and appropriate food choices  11/7/2019 1743 by Jv Domínguez RN  Outcome: Progressing  11/7/2019 1742 by Jv Domínguez RN  Outcome: Progressing  Goal: Maintains adequate nutritional intake  Description  INTERVENTIONS:  - Monitor percentage of each meal consumed  - Identify factors contributing to decreased intake, treat as appropriate  - Assist with meals as needed  - Monitor I&O, weight, and lab values if indicated  - Obtain nutrition services referral as needed  11/7/2019 1743 by Jv Domínguez RN  Outcome: Progressing  11/7/2019 1742 by Jv Domínguez RN  Outcome: Progressing     Problem: DISCHARGE PLANNING - CARE MANAGEMENT  Goal: Discharge to post-acute care or home with appropriate resources  Description  INTERVENTIONS:  - Conduct assessment to determine patient/family and health care team treatment goals, and need for post-acute services based on payer coverage, community resources, and patient preferences, and barriers to discharge  - Address psychosocial, clinical, and financial barriers to discharge as identified in assessment in conjunction with the patient/family and health care team  - Arrange appropriate level of post-acute services according to patient's   needs and preference and payer coverage in collaboration with the physician and health care team  - Communicate with and update the patient/family, physician, and health care team regarding progress on the discharge plan  - Arrange appropriate transportation to post-acute venues    Pt's goal is to return home with hh   11/7/2019 1743 by Ana Lucas RN  Outcome: Progressing  11/7/2019 1742 by Ana Lucas, RN  Outcome: Progressing

## 2019-11-07 NOTE — PROGRESS NOTES
Progress Note - Donis Villegas 82 y o  female MRN: [id number]    Unit/Bed#: -01 Encounter: [id number]        Subjective:   Patient seen and examined at bedside after reviewing the chart and discussing the case with the caring staff  Patient examined at bedside  Patient has received bowel prep for colonoscopy today and had 5-7 episodes of bowel movements  Patient denied any nausea or vomiting episodes  Patient will be receiving colonoscopy today  Physical Exam   Vitals: Blood pressure 101/54, pulse 84, temperature 97 6 °F (36 4 °C), temperature source Tympanic, resp  rate 20, height 5' (1 524 m), weight 70 5 kg (155 lb 6 8 oz), SpO2 99 %, not currently breastfeeding  ,Body mass index is 30 35 kg/m²  Constitutional: He appears well-developed  HEENT: PERR, EOMI, MMM  Cardiovascular: Normal rate and regular rhythm  Pulmonary/Chest: Effort normal and breath sounds normal    Abdomen: Soft, + BS, NT    Assessment/Plan:  Donis Villegas is a(n) 82y o  year old female with intractable diarrhea with dehydration     1  Chronic diarrhea with possible colitis of unknown etiology/GERD  Dr Lorna Toney on consult, she has seen the patient in the previous admission 3-4 days ago   All workup was found to be negative including C diff, fecal leukocytes, ova and parasites PE She thinks it is a possible medication induced versus microscopic colitis  Shira Soni has recommended repeat colonoscopy with biopsies on outpatient basis   Patient is receiving colonoscopy today  Patient is on stool bulking agent Metamucil   I have also put the patient on lactose-free diet  Celiac disease antibody profile results are still pending  Patient is on Protonix and may gets Zofran on as needed basis  2  Dehydration  Patient has been put on IV fluids normal saline with potassium chloride at 100 cc an hour   3   Generalized weakness   I will consult PT OT for evaluation and treatment     4  Cardiac with history of hypertension, atrial fibrillation   Patient will be continued on Eliquis, diltiazem, Lasix metoprolol and potassium chloride  5  COPD   Patient is getting Breo Ellipta, Combivent, albuterol, ipratropium nebs and montelukast along with oxygen on as-needed basis via nasal cannula  6  Osteoporosis   Patient is on alendronate along with calcium and vitamin-D   7  DJD/osteoarthritis with the right thigh/leg pain   Patient has been put on Voltaren gel along with heating pad over the leg   Patient may get Tylenol on as needed basis

## 2019-11-07 NOTE — ANESTHESIA PREPROCEDURE EVALUATION
Review of Systems/Medical History  Patient summary reviewed  Chart reviewed      Cardiovascular  CHF ,    Pulmonary  COPD , Asthma ,        GI/Hepatic    GERD ,             Endo/Other     GYN       Hematology   Musculoskeletal    Arthritis     Neurology   Psychology   Anxiety,            10/11 2024 10/09 1511 10/06 0710    BLOOD UA    2+Abnormal         Trace-IntactAbnormal         Trace-IntactAbnormal        WBC UA    1+Abnormal         Negative        1+Abnormal        NITRITE UA    Negative        Negative        Negative       PROTEIN UA    Negative mg/dl       Negative mg/dl       Negative mg/dl      CRT UR    --        --        --       Chemistry Labs       (Last 3 results in the past 90 days)    Yesterday 0511 11/05 1523 11/03 1449   Na+    139 mmol/L       138 mmol/L       136 mmol/L      K+    3 7 mmol/L       3 5 mmol/L       3 3Low  mmol/L      Ca2+    8 6 mg/dL       9 1 mg/dL       9 1 mg/dL      Cl    107 mmol/L       101 mmol/L       103 mmol/L      BUN    5Low  mg/dL       11 mg/dL       6Low  mg/dL      CRT    0 42Low  mg/dL       0 60 mg/dL       0 52Low  mg/dL      ALBUMIN    3 1Low  g/dL       3 6 g/dL       3 5 g/dL      Mg++    --        --        --       GLUCOSE    106High  mg/dL       115High  mg/dL       117High  mg/dL      Coagulation Labs       (Last 90 days)    10/11 1926   PTT    28 seconds      PT    15 6High  seconds      INR    1 34       Electrolyte Labs       (Last 3 results in the past 90 days)    Yesterday 0511 11/05 1523 11/03 1449   Na    139 mmol/L       138 mmol/L       136 mmol/L      K    3 7 mmol/L       3 5 mmol/L       3 3Low  mmol/L      Cl    107 mmol/L       101 mmol/L       103 mmol/L      Glucose    --        --        --       Bicarbonate    --        --        --       BUN    5Low  mg/dL       11 mg/dL       6Low  mg/dL      Digitoxin    --        --        --       TSH    --        --        --       Mg    --        --        --       P    --        -- --       CK MB    --        --        --       BNP    --        --        100 pg/mL      Tropinin I    --        --        --                  Anesthesia Plan  ASA Score- 3     Anesthesia Type- IV sedation with anesthesia with ASA Monitors  Additional Monitors:   Airway Plan:         Plan Factors-    Induction- intravenous      Postoperative Plan-     Informed Consent-

## 2019-11-07 NOTE — PHYSICAL THERAPY NOTE
11/07/19 1053   Pain Assessment   Pain Assessment No/denies pain   Pain Score No Pain   Restrictions/Precautions   Weight Bearing Precautions Per Order No   Other Precautions Multiple lines;O2;Fall Risk   General   Chart Reviewed Yes   Response to Previous Treatment Patient with no complaints from previous session  Family/Caregiver Present No   Cognition   Overall Cognitive Status WFL   Arousal/Participation Alert; Responsive   Attention Attends with cues to redirect   Orientation Level Oriented X4   Memory Decreased short term memory   Following Commands Follows one step commands without difficulty   Subjective   Subjective "I'm not walking"   Bed Mobility   Additional Comments Pt OOB in chair upon arrival   Balance   Static Sitting Good   Dynamic Sitting Fair +   Exercises   Quad Sets Sitting;25 reps;AROM; Bilateral   Heelslides Sitting;25 reps;AROM; Bilateral   Glute Sets Sitting;25 reps;AROM; Bilateral   Hip Flexion Sitting;25 reps;AROM; Bilateral   Hip Abduction Sitting;25 reps;AROM; Bilateral   Hip Adduction Sitting;25 reps;AROM; Bilateral   Knee AROM Long Arc Quad Sitting;25 reps;AROM; Bilateral   Ankle Pumps Sitting;25 reps;AROM; Bilateral   Marching Sitting;25 reps;AROM; Bilateral   Assessment   Prognosis Good   Problem List Decreased strength;Decreased endurance; Impaired balance;Decreased safety awareness   Assessment Pt seen for PT treatment session this date with interventions consisting of Therapeutic exercise consisting of: AROM 25 reps B LE in sitting position  Pt agreeable to PT treatment session upon arrival, pt found seated OOB in chair, in no apparent distress and responsive  In comparison to previous session, pt with no improvements as evidenced by not wanting to ambulate  Post session: pt returned back to recliner, chair alarm engaged and all needs in reach Continue to recommend Dilma 80 at time of d/c in order to maximize pt's functional independence and safety w/ mobility   Pt continues to be functioning below baseline level, and remains limited 2* factors listed above and including decreased functional mobility  PT will continue to see pt while here in order to address the deficits listed above and provide interventions consistent w/ POC in effort to achieve STGs  Barriers to Discharge Decreased caregiver support   Goals   Patient Goals to go home   PT Treatment Day 2   Plan   Treatment/Interventions Functional transfer training;LE strengthening/ROM; Therapeutic exercise; Endurance training;Bed mobility;Gait training   Progress Slow progress, medical status limitations   PT Frequency Other (Comment)  (3-5x/wk)   Recommendation   Recommendation Home PT   Equipment Recommended Sharri Dire

## 2019-11-07 NOTE — NURSING NOTE
Pt found to have not fully completed her bowel prep for procedure colonoscopy planned for today as add on status  Asad Beth on call and notified of delay in bowel completion  Received orders to give patient the remaining bowel prep as much as tolerated until 0530 this morning after which she'll be NPO afterwards for possible procedure after 1 pm  Pt did have large bowel movement x3 after the prep of which she was able to tolerated and complete 3/4 of prep  Pt instructed to call bell for assistance if needed  Bed low, call bell in reach  Will continue to monitor and reassess

## 2019-11-07 NOTE — UTILIZATION REVIEW
OBSERVATION 11/5 @ [address] Victor Manuel 11/6 @ 1707 DUE TO >2 MN STAY REQUIRED TO CARE FOR PT WITH DIARRHEA WITH POSSIBLE COLITIS OF UNKNOWN ETIOLOGY, DEHYDRATION, GENERALIZED WEAKNESS NEEDING CONTINUED WORKUP, IVF, GI CONSULT FOR POSSIBLE COLONOSCOPY  11/06/19 1708  Inpatient Admission Once     Transfer Service: General Medicine       Question Answer Comment   Admitting Physician [facility]    Level of Care Med Surg    Estimated length of stay More than 2 Midnights    Certification I certify that inpatient services are medically necessary for this patient for a duration of greater than two midnights  See H&P and MD Progress Notes for additional information about the patient's course of treatment          11/06/19 1706

## 2019-11-07 NOTE — RESPIRATORY THERAPY NOTE
RT Protocol Note  Krysten Hidden 82 y o  female MRN: [id number]  Unit/Bed#: -01 Encounter: [id number]    Assessment    Active Problems:    * No active hospital problems  *      Home Pulmonary Medications:    Home Devices/Therapy: (P) Home O2, Other (Comment)(nebs qid)    Past Medical History:   Diagnosis Date    Asthma     Atrial fibrillation     Blurred vision     Cardiac disease     Colitis     COPD (chronic obstructive pulmonary disease)     Diverticulosis     DJD (degenerative joint disease)     Emphysema lung     Gait abnormality      Social History     Socioeconomic History    Marital status:       Spouse name: None    Number of children: None    Years of education: None    Highest education level: None   Occupational History    None   Social Needs    Financial resource strain: None    Food insecurity:     Worry: None     Inability: None    Transportation needs:     Medical: None     Non-medical: None   Tobacco Use    Smoking status: Former Smoker     Last attempt to quit: 2013     Years since quittin 9    Smokeless tobacco: Never Used   Substance and Sexual Activity    Alcohol use: Not Currently     Frequency: Never     Binge frequency: Never    Drug use: No    Sexual activity: Not Currently   Lifestyle    Physical activity:     Days per week: None     Minutes per session: None    Stress: None   Relationships    Social connections:     Talks on phone: None     Gets together: None     Attends Confucianist service: None     Active member of club or organization: None     Attends meetings of clubs or organizations: None     Relationship status: None    Intimate partner violence:     Fear of current or ex partner: None     Emotionally abused: None     Physically abused: None     Forced sexual activity: None   Other Topics Concern    None   Social History Narrative    None       Subjective         Objective    Physical Exam:   Assessment Type: (P) Pre-treatment  General Appearance: (P) Alert, Awake  Respiratory Pattern: (P) Normal  Chest Assessment: (P) Chest expansion symmetrical  Bilateral Breath Sounds: (P) Diminished, Clear  Cough: (P) None    Vitals:  Blood pressure 112/58, pulse 78, temperature 98 2 °F (36 8 °C), temperature source Temporal, resp  rate 20, height 5' (1 524 m), weight 70 5 kg (155 lb 6 8 oz), SpO2 96 %, not currently breastfeeding  Imaging and other studies: I have personally reviewed pertinent reports              Plan    Respiratory Plan: (P) Home Bronchodilator Patient pathway        Resp Comments: (P) pt assessed as per protocol, pt resting comfortably in bed on 2ll/m as per stated home o2 use, pt denies any current sob or distress, pt states she uses nebs qid at home

## 2019-11-07 NOTE — PLAN OF CARE
Problem: PHYSICAL THERAPY ADULT  Goal: Performs mobility at highest level of function for planned discharge setting  See evaluation for individualized goals  Description  Treatment/Interventions: Functional transfer training, LE strengthening/ROM, Therapeutic exercise, Endurance training, Patient/family training, Equipment eval/education, Bed mobility, Gait training, Spoke to nursing, Spoke to case management  Equipment Recommended: Walker(continue RW use)       See flowsheet documentation for full assessment, interventions and recommendations  Outcome: Progressing  Note:   Prognosis: Good  Problem List: Decreased strength, Decreased endurance, Impaired balance, Decreased safety awareness  Assessment: Pt seen for PT treatment session this date with interventions consisting of Therapeutic exercise consisting of: AROM 25 reps B LE in sitting position  Pt agreeable to PT treatment session upon arrival, pt found seated OOB in chair, in no apparent distress and responsive  In comparison to previous session, pt with no improvements as evidenced by not wanting to ambulate  Post session: pt returned back to recliner, chair alarm engaged and all needs in reach Continue to recommend [facility] at time of d/c in order to maximize pt's functional independence and safety w/ mobility  Pt continues to be functioning below baseline level, and remains limited 2* factors listed above and including decreased functional mobility  PT will continue to see pt while here in order to address the deficits listed above and provide interventions consistent w/ POC in effort to achieve STGs  Barriers to Discharge: Decreased caregiver support     Recommendation: Home PT          See flowsheet documentation for full assessment

## 2019-11-07 NOTE — SOCIAL WORK
No d/c for today, pt went for a colonoscopy, Sara Miller her friend called and made her aware that there would be no d/c today and possibly a d/c for tomorrow, cm will continue to follow, plan is fir the pt to go home with [facility], pt's grand daughter was made aware that the pt should be alone any longer and a [facility] would be good for this pt, the pt will not allow the grand daughter or myself to call her children and she will not give us there phone#, [facility] has been set up for th pt, cm will continue to follow, d/c plan was discussed at care coordination rounds today

## 2019-11-08 LAB
FOLATE SERPL-MCNC: >20 NG/ML (ref 3.1–17.5)
IRON SERPL-MCNC: 24 UG/DL (ref 50–170)
WBC SPEC QL GRAM STN: NORMAL

## 2019-11-08 PROCEDURE — 94640 AIRWAY INHALATION TREATMENT: CPT

## 2019-11-08 PROCEDURE — 82746 ASSAY OF FOLIC ACID SERUM: CPT | Performed by: PHYSICIAN ASSISTANT

## 2019-11-08 PROCEDURE — 94760 N-INVAS EAR/PLS OXIMETRY 1: CPT

## 2019-11-08 PROCEDURE — 97110 THERAPEUTIC EXERCISES: CPT

## 2019-11-08 PROCEDURE — 83540 ASSAY OF IRON: CPT | Performed by: PHYSICIAN ASSISTANT

## 2019-11-08 RX ADMIN — DILTIAZEM HYDROCHLORIDE 240 MG: 240 CAPSULE, COATED, EXTENDED RELEASE ORAL at 09:33

## 2019-11-08 RX ADMIN — CALCIUM CARBONATE (ANTACID) CHEW TAB 500 MG 500 MG: 500 CHEW TAB at 00:59

## 2019-11-08 RX ADMIN — METOPROLOL TARTRATE 12.5 MG: 25 TABLET ORAL at 09:33

## 2019-11-08 RX ADMIN — POTASSIUM CHLORIDE AND SODIUM CHLORIDE 100 ML/HR: 900; 150 INJECTION, SOLUTION INTRAVENOUS at 01:56

## 2019-11-08 RX ADMIN — PANTOPRAZOLE SODIUM 40 MG: 40 TABLET, DELAYED RELEASE ORAL at 05:13

## 2019-11-08 RX ADMIN — APIXABAN 5 MG: 5 TABLET, FILM COATED ORAL at 17:32

## 2019-11-08 RX ADMIN — ACETAMINOPHEN 488 MG: 325 TABLET ORAL at 15:52

## 2019-11-08 RX ADMIN — MONTELUKAST SODIUM 10 MG: 10 TABLET, COATED ORAL at 22:07

## 2019-11-08 RX ADMIN — IPRATROPIUM BROMIDE AND ALBUTEROL SULFATE 3 ML: 2.5; .5 SOLUTION RESPIRATORY (INHALATION) at 15:52

## 2019-11-08 RX ADMIN — IPRATROPIUM BROMIDE AND ALBUTEROL SULFATE 3 ML: 2.5; .5 SOLUTION RESPIRATORY (INHALATION) at 11:14

## 2019-11-08 RX ADMIN — PSYLLIUM HUSK 1 PACKET: 3.4 POWDER ORAL at 09:29

## 2019-11-08 RX ADMIN — PSYLLIUM HUSK 1 PACKET: 3.4 POWDER ORAL at 17:32

## 2019-11-08 RX ADMIN — PSYLLIUM HUSK 1 PACKET: 3.4 POWDER ORAL at 22:09

## 2019-11-08 RX ADMIN — CALCIUM CARBONATE (ANTACID) CHEW TAB 500 MG 500 MG: 500 CHEW TAB at 18:20

## 2019-11-08 RX ADMIN — POTASSIUM CHLORIDE AND SODIUM CHLORIDE 100 ML/HR: 900; 150 INJECTION, SOLUTION INTRAVENOUS at 17:33

## 2019-11-08 RX ADMIN — POTASSIUM CHLORIDE 20 MEQ: 1500 TABLET, EXTENDED RELEASE ORAL at 09:26

## 2019-11-08 RX ADMIN — CALCIUM CARBONATE-VITAMIN D TAB 500 MG-200 UNIT 1 TABLET: 500-200 TAB at 09:27

## 2019-11-08 RX ADMIN — ACETAMINOPHEN 488 MG: 325 TABLET ORAL at 05:11

## 2019-11-08 RX ADMIN — METOPROLOL TARTRATE 12.5 MG: 25 TABLET ORAL at 22:07

## 2019-11-08 RX ADMIN — CALCIUM CARBONATE (ANTACID) CHEW TAB 500 MG 500 MG: 500 CHEW TAB at 22:07

## 2019-11-08 RX ADMIN — APIXABAN 5 MG: 5 TABLET, FILM COATED ORAL at 09:26

## 2019-11-08 RX ADMIN — IPRATROPIUM BROMIDE AND ALBUTEROL SULFATE 3 ML: 2.5; .5 SOLUTION RESPIRATORY (INHALATION) at 19:31

## 2019-11-08 RX ADMIN — IPRATROPIUM BROMIDE AND ALBUTEROL SULFATE 3 ML: 2.5; .5 SOLUTION RESPIRATORY (INHALATION) at 07:38

## 2019-11-08 RX ADMIN — CALCIUM CARBONATE-VITAMIN D TAB 500 MG-200 UNIT 1 TABLET: 500-200 TAB at 17:31

## 2019-11-08 NOTE — SOCIAL WORK
Chart reviewed by CM, pt continues to have lose stools and needs IV fluids, I spoke with Eunice TORRES and she stated the pt will be here for the weekend, [facility] has accepted the pt, pt;s POA will transport the pt home, d/c plan was discussed at care coordination rounds today

## 2019-11-08 NOTE — PLAN OF CARE
Problem: PHYSICAL THERAPY ADULT  Goal: Performs mobility at highest level of function for planned discharge setting  See evaluation for individualized goals  Description  Treatment/Interventions: Functional transfer training, LE strengthening/ROM, Therapeutic exercise, Endurance training, Patient/family training, Equipment eval/education, Bed mobility, Gait training, Spoke to nursing, Spoke to case management  Equipment Recommended: Walker(continue RW use)       See flowsheet documentation for full assessment, interventions and recommendations  Outcome: Progressing  Note:   Prognosis: Good  Problem List: Decreased strength, Decreased endurance, Impaired balance, Decreased mobility, Decreased safety awareness  Assessment: Pt  found resting in bed and when offered PT treatment she stated that she would do anything in bed but she was to wiped out from the colonocopy she had yet  Pt  performed 25 reps of acttive ther ex on BLE as per treatment flow sheet without difficulty only needing occational  rest periods to "catch her breath"  Pt  denied pain throughout the treatment  Pt  stated that she would attempt to get up and walk with therapy on Monday after being educated on the importance of getting up and walking to maintain her current functional monbility  Pt  was positioned for comfort and left with all needs in reach  Continue current POC and progress as tolerated  Barriers to Discharge: Decreased caregiver support     Recommendation: Home PT     PT - OK to Discharge: Yes    See flowsheet documentation for full assessment     Jim Barnes, PTA

## 2019-11-08 NOTE — PROGRESS NOTES
Shwetha,#  OHB:[id number] F  NYU:[id number]    QDS:[id number]  Adm Date: 11/5/2019 1458  2:58 PM   ATT PHY: [id number]         Subjective     The patient was seen after reviewing the chart and discussing the case with caring staff  Today during our encounter, the patient reported continued diarrhea  She does not fel safe for discharge as a result  Colonoscopy was done yesterday which showed internal hemorrhoids without bleeding, multiple moderate stool-containing diverticula in the transverse colon, and multiple areas or polyps (see report for details)  Iron and folate levels are pending  Objective     Vitals:    11/08/19 1114   BP:    Pulse:    Resp:    Temp:    SpO2: 94%       General Appearance: Awake and Alert  No acute distress  HEENT: Normocephalic, atraumatic  PERRLA, EOMI, MMM  Heart: RRR, no murmurs  Normal S1 and S2   Lungs: CTA bilaterally with fair air entry  Assessment     Grace Tadeo is a(n) 82 y  o  year old female with intractable diarrhea with dehydration     1  Chronic diarrhea with possible colitis of unknown etiology/GERD  Dr Jeffery Carter on consult, and has performed the colonoscopy, with the polyp analyses pending   All other workup was found to be negative including Celiac profile, C diff, fecal leukocytes, ova and parasites Continuet Metamucil and lactose-free diet  Patient is on Protonix and may gets Zofran on as needed basis  Will check CMP tomorrow  2  Dehydration   Continue IV fluids normal saline with potassium chloride at 100 cc an hour  Will check CMP tomorrow especially considering ongoing fluid losses in diarrhea  3  Generalized weakness  PT/OT  4  Cardiac with history of hypertension, atrial fibrillation   Eliquis, diltiazem, Lasix metoprolol and potassium chloride    5  COPD   Breo Ellipta, Combivent, albuterol, ipratropium nebs and montelukast along with oxygen on as-needed basis via nasal cannula  6  Osteoporosis   Patient is on alendronate along with calcium and vitamin-D   7  DJD/osteoarthritis with the right thigh/leg pain   Voltaren gel along with heating pad over the leg   Patient may get Tylenol on as needed basis  8  Anemia  Will continue to trend  Iron and folate levels are pending  The patient was discussed with Dr Oneal Linda and he is in agreement with the above note

## 2019-11-08 NOTE — PHYSICAL THERAPY NOTE
11/08/19 0909   Pain Assessment   Pain Assessment No/denies pain   Pain Score No Pain   Restrictions/Precautions   Weight Bearing Precautions Per Order No   General   Chart Reviewed Yes   Response to Previous Treatment Patient with no complaints from previous session  Family/Caregiver Present No   Cognition   Overall Cognitive Status WFL   Arousal/Participation Alert; Responsive   Attention Attends with cues to redirect   Orientation Level Oriented X4   Memory Decreased short term memory   Following Commands Follows one step commands without difficulty   Subjective   Subjective "I not ready to walk yet today, I'm just to weak from that colon test they did "   Endurance Deficit   Endurance Deficit Yes   Activity Tolerance   Activity Tolerance Patient limited by fatigue   Exercises   Quad Sets Supine;25 reps;AROM; Bilateral   Heelslides Supine;25 reps;AROM; Bilateral   Glute Sets Supine;25 reps;AROM; Bilateral   Hip Flexion Supine;25 reps;AROM; Bilateral   Hip Abduction Supine;25 reps;AROM; Bilateral   Hip Adduction Supine;25 reps;AROM; Bilateral   Knee AROM Short Arc Quad Supine;25 reps;AROM; Bilateral   Ankle Pumps Supine;25 reps;Bilateral;AROM   Bridging Supine;10 reps;AROM   Assessment   Prognosis Good   Problem List Decreased strength;Decreased endurance; Impaired balance;Decreased mobility; Decreased safety awareness   Assessment Pt  found resting in bed and when offered PT treatment she stated that she would do anything in bed but she was to wiped out from the colonocopy she had yet  Pt  performed 25 reps of acttive ther ex on BLE as per treatment flow sheet without difficulty only needing occational  rest periods to "catch her breath"  Pt  denied pain throughout the treatment  Pt  stated that she would attempt to get up and walk with therapy on Monday after being educated on the importance of getting up and walking to maintain her current functional monbility    Pt  was positioned for comfort and left with all needs in reach  Continue current POC and progress as tolerated  Goals   Patient Goals to get home to stay home   PT Treatment Day 3   Plan   Treatment/Interventions Functional transfer training;LE strengthening/ROM; Therapeutic exercise; Endurance training;Patient/family training;Bed mobility;Gait training   Progress Slow progress, medical status limitations   PT Frequency Other (Comment)  (3-5x/wk)   Recommendation   Recommendation Home PT   Equipment Recommended Walker   PT - OK to Discharge Yes   Additional Comments when medically stable   Hailey Copa, PTA

## 2019-11-09 LAB
ALBUMIN SERPL BCP-MCNC: 3.2 G/DL (ref 3.5–5.7)
ALP SERPL-CCNC: 76 U/L (ref 55–165)
ALT SERPL W P-5'-P-CCNC: 15 U/L (ref 7–52)
ANION GAP SERPL CALCULATED.3IONS-SCNC: 6 MMOL/L (ref 4–13)
AST SERPL W P-5'-P-CCNC: 10 U/L (ref 13–39)
BASOPHILS # BLD AUTO: 0 THOUSANDS/ΜL (ref 0–0.1)
BASOPHILS NFR BLD AUTO: 1 % (ref 0–2)
BILIRUB SERPL-MCNC: 0.2 MG/DL (ref 0.2–1)
BUN SERPL-MCNC: 5 MG/DL (ref 7–25)
CALCIUM SERPL-MCNC: 8.9 MG/DL (ref 8.6–10.5)
CHLORIDE SERPL-SCNC: 106 MMOL/L (ref 98–107)
CO2 SERPL-SCNC: 27 MMOL/L (ref 21–31)
CREAT SERPL-MCNC: 0.44 MG/DL (ref 0.6–1.2)
EOSINOPHIL # BLD AUTO: 0.1 THOUSAND/ΜL (ref 0–0.61)
EOSINOPHIL NFR BLD AUTO: 3 % (ref 0–5)
ERYTHROCYTE [DISTWIDTH] IN BLOOD BY AUTOMATED COUNT: 16.7 % (ref 11.5–14.5)
GFR SERPL CREATININE-BSD FRML MDRD: 94 ML/MIN/1.73SQ M
GLUCOSE SERPL-MCNC: 123 MG/DL (ref 65–99)
HCT VFR BLD AUTO: 31.7 % (ref 42–47)
HGB BLD-MCNC: 10 G/DL (ref 12–16)
LYMPHOCYTES # BLD AUTO: 1.1 THOUSANDS/ΜL (ref 0.6–4.47)
LYMPHOCYTES NFR BLD AUTO: 22 % (ref 21–51)
MCH RBC QN AUTO: 26.4 PG (ref 26–34)
MCHC RBC AUTO-ENTMCNC: 31.6 G/DL (ref 31–37)
MCV RBC AUTO: 84 FL (ref 81–99)
MONOCYTES # BLD AUTO: 0.6 THOUSAND/ΜL (ref 0.17–1.22)
MONOCYTES NFR BLD AUTO: 13 % (ref 2–12)
NEUTROPHILS # BLD AUTO: 3 THOUSANDS/ΜL (ref 1.4–6.5)
NEUTS SEG NFR BLD AUTO: 62 % (ref 42–75)
PLATELET # BLD AUTO: 268 THOUSANDS/UL (ref 149–390)
PMV BLD AUTO: 7.7 FL (ref 8.6–11.7)
POTASSIUM SERPL-SCNC: 4 MMOL/L (ref 3.5–5.5)
PROT SERPL-MCNC: 5.7 G/DL (ref 6.4–8.9)
RBC # BLD AUTO: 3.79 MILLION/UL (ref 3.9–5.2)
SODIUM SERPL-SCNC: 139 MMOL/L (ref 134–143)
WBC # BLD AUTO: 4.9 THOUSAND/UL (ref 4.8–10.8)

## 2019-11-09 PROCEDURE — 87070 CULTURE OTHR SPECIMN AEROBIC: CPT | Performed by: FAMILY MEDICINE

## 2019-11-09 PROCEDURE — 87205 SMEAR GRAM STAIN: CPT | Performed by: FAMILY MEDICINE

## 2019-11-09 PROCEDURE — 94640 AIRWAY INHALATION TREATMENT: CPT

## 2019-11-09 PROCEDURE — 87186 SC STD MICRODIL/AGAR DIL: CPT | Performed by: FAMILY MEDICINE

## 2019-11-09 PROCEDURE — 94760 N-INVAS EAR/PLS OXIMETRY 1: CPT

## 2019-11-09 PROCEDURE — 85025 COMPLETE CBC W/AUTO DIFF WBC: CPT | Performed by: PHYSICIAN ASSISTANT

## 2019-11-09 PROCEDURE — 87147 CULTURE TYPE IMMUNOLOGIC: CPT | Performed by: FAMILY MEDICINE

## 2019-11-09 PROCEDURE — 80053 COMPREHEN METABOLIC PANEL: CPT | Performed by: PHYSICIAN ASSISTANT

## 2019-11-09 RX ORDER — CHOLESTYRAMINE LIGHT 4 G/5.7G
4 POWDER, FOR SUSPENSION ORAL 2 TIMES DAILY
Status: DISCONTINUED | OUTPATIENT
Start: 2019-11-09 | End: 2019-11-12 | Stop reason: HOSPADM

## 2019-11-09 RX ORDER — IPRATROPIUM BROMIDE AND ALBUTEROL SULFATE 2.5; .5 MG/3ML; MG/3ML
3 SOLUTION RESPIRATORY (INHALATION)
Status: DISCONTINUED | OUTPATIENT
Start: 2019-11-09 | End: 2019-11-12 | Stop reason: HOSPADM

## 2019-11-09 RX ADMIN — APIXABAN 5 MG: 5 TABLET, FILM COATED ORAL at 17:24

## 2019-11-09 RX ADMIN — PANTOPRAZOLE SODIUM 40 MG: 40 TABLET, DELAYED RELEASE ORAL at 05:00

## 2019-11-09 RX ADMIN — POTASSIUM CHLORIDE AND SODIUM CHLORIDE 100 ML/HR: 900; 150 INJECTION, SOLUTION INTRAVENOUS at 03:50

## 2019-11-09 RX ADMIN — IRON SUCROSE 200 MG: 20 INJECTION, SOLUTION INTRAVENOUS at 17:36

## 2019-11-09 RX ADMIN — DILTIAZEM HYDROCHLORIDE 240 MG: 240 CAPSULE, COATED, EXTENDED RELEASE ORAL at 09:10

## 2019-11-09 RX ADMIN — MONTELUKAST SODIUM 10 MG: 10 TABLET, COATED ORAL at 21:30

## 2019-11-09 RX ADMIN — METOPROLOL TARTRATE 12.5 MG: 25 TABLET ORAL at 09:09

## 2019-11-09 RX ADMIN — APIXABAN 5 MG: 5 TABLET, FILM COATED ORAL at 09:10

## 2019-11-09 RX ADMIN — ACETAMINOPHEN 488 MG: 325 TABLET ORAL at 04:20

## 2019-11-09 RX ADMIN — CALCIUM CARBONATE-VITAMIN D TAB 500 MG-200 UNIT 1 TABLET: 500-200 TAB at 09:10

## 2019-11-09 RX ADMIN — METOPROLOL TARTRATE 12.5 MG: 25 TABLET ORAL at 21:30

## 2019-11-09 RX ADMIN — IPRATROPIUM BROMIDE AND ALBUTEROL SULFATE 3 ML: 2.5; .5 SOLUTION RESPIRATORY (INHALATION) at 21:25

## 2019-11-09 RX ADMIN — PSYLLIUM HUSK 1 PACKET: 3.4 POWDER ORAL at 21:30

## 2019-11-09 RX ADMIN — CHOLESTYRAMINE 4 G: 4 POWDER, FOR SUSPENSION ORAL at 17:24

## 2019-11-09 RX ADMIN — POTASSIUM CHLORIDE 20 MEQ: 1500 TABLET, EXTENDED RELEASE ORAL at 09:09

## 2019-11-09 RX ADMIN — IPRATROPIUM BROMIDE AND ALBUTEROL SULFATE 3 ML: 2.5; .5 SOLUTION RESPIRATORY (INHALATION) at 07:05

## 2019-11-09 RX ADMIN — IPRATROPIUM BROMIDE AND ALBUTEROL SULFATE 3 ML: 2.5; .5 SOLUTION RESPIRATORY (INHALATION) at 13:29

## 2019-11-09 RX ADMIN — ACETAMINOPHEN 488 MG: 325 TABLET ORAL at 22:11

## 2019-11-09 RX ADMIN — POTASSIUM CHLORIDE AND SODIUM CHLORIDE 100 ML/HR: 900; 150 INJECTION, SOLUTION INTRAVENOUS at 16:22

## 2019-11-09 RX ADMIN — PSYLLIUM HUSK 1 PACKET: 3.4 POWDER ORAL at 09:10

## 2019-11-09 NOTE — PLAN OF CARE
Problem: Potential for Falls  Goal: Patient will remain free of falls  Description  INTERVENTIONS:  - Assess patient frequently for physical needs  -  Identify cognitive and physical deficits and behaviors that affect risk of falls    -  [place] fall precautions as indicated by assessment   - Educate patient/family on patient safety including physical limitations  - Instruct patient to call for assistance with activity based on assessment  - Modify environment to reduce risk of injury  - Consider OT/PT consult to assist with strengthening/mobility  Outcome: Progressing     Problem: PAIN - ADULT  Goal: Verbalizes/displays adequate comfort level or baseline comfort level  Description  Interventions:  - Encourage patient to monitor pain and request assistance  - Assess pain using appropriate pain scale  - Administer analgesics based on type and severity of pain and evaluate response  - Implement non-pharmacological measures as appropriate and evaluate response  - Consider cultural and social influences on pain and pain management  - Notify physician/advanced practitioner if interventions unsuccessful or patient reports new pain  Outcome: Progressing     Problem: INFECTION - ADULT  Goal: Absence or prevention of progression during hospitalization  Description  INTERVENTIONS:  - Assess and monitor for signs and symptoms of infection  - Monitor lab/diagnostic results  - Monitor all insertion sites, i e  indwelling lines, tubes, and drains  - Monitor endotracheal if appropriate and nasal secretions for changes in amount and color  - [place] appropriate cooling/warming therapies per order  - Administer medications as ordered  - Instruct and encourage patient and family to use good hand hygiene technique  - Identify and instruct in appropriate isolation precautions for identified infection/condition  Outcome: Progressing  Goal: Absence of fever/infection during neutropenic period  Description  INTERVENTIONS:  - Monitor WBC    Outcome: Progressing     Problem: SAFETY ADULT  Goal: Maintain or return to baseline ADL function  Description  INTERVENTIONS:  -  Assess patient's ability to carry out ADLs; assess patient's baseline for ADL function and identify physical deficits which impact ability to perform ADLs (bathing, care of mouth/teeth, toileting, grooming, dressing, etc )  - Assess/evaluate cause of self-care deficits   - Assess range of motion  - Assess patient's mobility; develop plan if impaired  - Assess patient's need for assistive devices and provide as appropriate  - Encourage maximum independence but intervene and supervise when necessary  - Involve family in performance of ADLs  - Assess for home care needs following discharge   - Consider OT consult to assist with ADL evaluation and planning for discharge  - Provide patient education as appropriate  Outcome: Progressing  Goal: Maintain or return mobility status to optimal level  Description  INTERVENTIONS:  - Assess patient's baseline mobility status (ambulation, transfers, stairs, etc )    - Identify cognitive and physical deficits and behaviors that affect mobility  - Identify mobility aids required to assist with transfers and/or ambulation (gait belt, sit-to-stand, lift, walker, cane, etc )  - [place] fall precautions as indicated by assessment  - Record patient progress and toleration of activity level on Mobility SBAR; progress patient to next Phase/Stage  - Instruct patient to call for assistance with activity based on assessment  - Consider rehabilitation consult to assist with strengthening/weightbearing, etc   Outcome: Progressing     Problem: DISCHARGE PLANNING  Goal: Discharge to home or other facility with appropriate resources  Description  INTERVENTIONS:  - Identify barriers to discharge w/patient and caregiver  - Arrange for needed discharge resources and transportation as appropriate  - Identify discharge learning needs (meds, wound care, etc )  - Arrange for interpretive services to assist at discharge as needed  - Refer to Case Management Department for coordinating discharge planning if the patient needs post-hospital services based on physician/advanced practitioner order or complex needs related to functional status, cognitive ability, or social support system  Outcome: Progressing     Problem: Knowledge Deficit  Goal: Patient/family/caregiver demonstrates understanding of disease process, treatment plan, medications, and discharge instructions  Description  Complete learning assessment and assess knowledge base    Interventions:  - Provide teaching at level of understanding  - Provide teaching via preferred learning methods  Outcome: Progressing     Problem: GASTROINTESTINAL - ADULT  Goal: Minimal or absence of nausea and/or vomiting  Description  INTERVENTIONS:  - Administer IV fluids if ordered to ensure adequate hydration    - Administer ordered antiemetic medications as needed  - Provide nonpharmacologic comfort measures as appropriate  - Consider nutrition services referral to assist patient with adequate nutrition and appropriate food choices   Outcome: Progressing  Goal: Maintains or returns to baseline bowel function  Description  INTERVENTIONS:  - Assess bowel function  - Encourage oral fluids to ensure adequate hydration  - Administer IV fluids if ordered to ensure adequate hydration  - Administer ordered medications as needed  - Encourage mobilization and activity  - Consider nutritional services referral to assist patient with adequate nutrition and appropriate food choices  Outcome: Progressing  Goal: Maintains adequate nutritional intake  Description  INTERVENTIONS:  - Monitor percentage of each meal consumed  - Identify factors contributing to decreased intake, treat as appropriate  - Assist with meals as needed  - Monitor I&O, weight, and lab values if indicated  - Obtain nutrition services referral as needed  Outcome: Progressing Problem: DISCHARGE PLANNING - CARE MANAGEMENT  Goal: Discharge to post-acute care or home with appropriate resources  Description  INTERVENTIONS:  - Conduct assessment to determine patient/family and health care team treatment goals, and need for post-acute services based on payer coverage, community resources, and patient preferences, and barriers to discharge  - Address psychosocial, clinical, and financial barriers to discharge as identified in assessment in conjunction with the patient/family and health care team  - Arrange appropriate level of post-acute services according to patient's   needs and preference and payer coverage in collaboration with the physician and health care team  - Communicate with and update the patient/family, physician, and health care team regarding progress on the discharge plan  - Arrange appropriate transportation to post-acute venues    Pt's goal is to return home with [facility]   Outcome: Progressing

## 2019-11-09 NOTE — NURSING NOTE
Pt complained of pain at IV site  Upon assessment, area appeared to be red, swollen and tender  IV removed  New line placed  Pt given warm washcloths to place on affected site  Pt instructed to notify staff if pain at site worsens  Pt declined medication for pain in wrist  States "The pain is tolerable right now " When assked if the IV was bumped or pulled on pt states "I went to the bathroom and when I wiped myself the IV must have gotten stuck somewhere, since then it hurts right down to the bone, my bone hurts right there when you touch it " Vital signs stable will continue to monitor progress and notify Dr as needed

## 2019-11-09 NOTE — PROGRESS NOTES
Progress Note - Ida Stroud 82 y o  female MRN: [id number]    Unit/Bed#: -01 Encounter: [id number]      Assessment:  1  Chronic diarrhea with possible colitis of unknown etiology/GERD  Patient having up to 10 bowel movements today which were loose and in small amounts the  Patient extremely frustrated  All notes and labs reviewed  Will attempt [place] [facility]     2  Dehydration   Receiving IV fluids  3  Generalized weakness  PT/OT  4  Cardiac with history of hypertension, atrial fibrillation   Eliquis, diltiazem, Lasix metoprolol and potassium chloride  5  COPD   Stable  On 0 2  6  Osteoporosis   Will hold all calcium containing products as this may be contributing to diarrhea  7  DJD/osteoarthritis with the right thigh/leg pain    treat symptomatically  8  Anemia  Hemoglobin is 10 g  Iron studies show serum iron of 24 which will require IV replacement with Venofer  P o  Iron supplementation after course of Venofer  Plan:  See above    Subjective:   As above    Objective:     Vitals: Blood pressure 110/68, pulse 83, temperature 98 1 °F (36 7 °C), temperature source Temporal, resp  rate 18, height 5' (1 524 m), weight 70 5 kg (155 lb 6 8 oz), SpO2 98 %, not currently breastfeeding  ,Body mass index is 30 35 kg/m²        Intake/Output Summary (Last 24 hours) at 11/9/2019 1700  Last data filed at 11/9/2019 1100  Gross per 24 hour   Intake 480 ml   Output 500 ml   Net -20 ml       Physical Exam: /68 (BP Location: Right arm)   Pulse 83   Temp 98 1 °F (36 7 °C) (Temporal)   Resp 18   Ht 5' (1 524 m)   Wt 70 5 kg (155 lb 6 8 oz)   LMP  (LMP Unknown)   SpO2 98%   BMI 30 35 kg/m²     General Appearance:    Alert, cooperative, no distress, appears stated age   Head:    Normocephalic, without obvious abnormality, atraumatic   Eyes:    PERRL, conjunctiva/corneas clear, EOM's intact, fundi     benign, both eyes               Neck:   Supple, symmetrical, trachea midline, no adenopathy; thyroid:  no enlargement/tenderness/nodules; no carotid    bruit or JVD   Back:     Symmetric, no curvature, ROM normal, no CVA tenderness   Lungs:     Clear to auscultation bilaterally, respirations unlabored   Chest Wall:    No tenderness or deformity    Heart:    Regular rate and rhythm, S1 and S2 normal, no murmur, rub   or gallop       Abdomen:     Soft, non-tender, bowel sounds active all four quadrants,     no masses, no organomegaly           Extremities:   Extremities normal, atraumatic, no cyanosis or edema   Pulses:   2+ and symmetric all extremities   Skin:   Skin color, texture, turgor normal, no rashes or lesions   Lymph nodes:   Cervical, supraclavicular, and axillary nodes normal   Neurologic:   CNII-XII intact, normal strength, sensation and reflexes     throughout        Invasive Devices     Peripheral Intravenous Line            Peripheral IV 11/07/19 Right;Dorsal (posterior) Forearm 2 days                Lab, Imaging and other studies: I have personally reviewed pertinent reports

## 2019-11-09 NOTE — SOCIAL WORK
Pt will require inpatient treatment through the weekend per attending  Discharge plan reains for pt to return home with [facility] follow up  POA will tranpsort home  CM to follow

## 2019-11-10 PROCEDURE — 97110 THERAPEUTIC EXERCISES: CPT

## 2019-11-10 PROCEDURE — 97530 THERAPEUTIC ACTIVITIES: CPT

## 2019-11-10 PROCEDURE — 97116 GAIT TRAINING THERAPY: CPT

## 2019-11-10 PROCEDURE — 94640 AIRWAY INHALATION TREATMENT: CPT

## 2019-11-10 PROCEDURE — 94760 N-INVAS EAR/PLS OXIMETRY 1: CPT

## 2019-11-10 RX ORDER — SULFAMETHOXAZOLE AND TRIMETHOPRIM 800; 160 MG/1; MG/1
1 TABLET ORAL EVERY 12 HOURS SCHEDULED
Status: DISCONTINUED | OUTPATIENT
Start: 2019-11-10 | End: 2019-11-12 | Stop reason: HOSPADM

## 2019-11-10 RX ADMIN — DILTIAZEM HYDROCHLORIDE 240 MG: 240 CAPSULE, COATED, EXTENDED RELEASE ORAL at 08:14

## 2019-11-10 RX ADMIN — SULFAMETHOXAZOLE AND TRIMETHOPRIM 1 TABLET: 800; 160 TABLET ORAL at 21:22

## 2019-11-10 RX ADMIN — CHOLESTYRAMINE 4 G: 4 POWDER, FOR SUSPENSION ORAL at 17:42

## 2019-11-10 RX ADMIN — POTASSIUM CHLORIDE 20 MEQ: 1500 TABLET, EXTENDED RELEASE ORAL at 08:13

## 2019-11-10 RX ADMIN — ACETAMINOPHEN 488 MG: 325 TABLET ORAL at 17:42

## 2019-11-10 RX ADMIN — CHOLESTYRAMINE 4 G: 4 POWDER, FOR SUSPENSION ORAL at 08:13

## 2019-11-10 RX ADMIN — METOPROLOL TARTRATE 12.5 MG: 25 TABLET ORAL at 08:14

## 2019-11-10 RX ADMIN — IRON SUCROSE 200 MG: 20 INJECTION, SOLUTION INTRAVENOUS at 08:26

## 2019-11-10 RX ADMIN — IPRATROPIUM BROMIDE AND ALBUTEROL SULFATE 3 ML: 2.5; .5 SOLUTION RESPIRATORY (INHALATION) at 20:06

## 2019-11-10 RX ADMIN — APIXABAN 5 MG: 5 TABLET, FILM COATED ORAL at 17:43

## 2019-11-10 RX ADMIN — APIXABAN 5 MG: 5 TABLET, FILM COATED ORAL at 08:14

## 2019-11-10 RX ADMIN — PANTOPRAZOLE SODIUM 40 MG: 40 TABLET, DELAYED RELEASE ORAL at 05:27

## 2019-11-10 RX ADMIN — IPRATROPIUM BROMIDE AND ALBUTEROL SULFATE 3 ML: 2.5; .5 SOLUTION RESPIRATORY (INHALATION) at 07:06

## 2019-11-10 RX ADMIN — POTASSIUM CHLORIDE AND SODIUM CHLORIDE 100 ML/HR: 900; 150 INJECTION, SOLUTION INTRAVENOUS at 03:23

## 2019-11-10 RX ADMIN — POTASSIUM CHLORIDE AND SODIUM CHLORIDE 100 ML/HR: 900; 150 INJECTION, SOLUTION INTRAVENOUS at 14:29

## 2019-11-10 RX ADMIN — MONTELUKAST SODIUM 10 MG: 10 TABLET, COATED ORAL at 21:22

## 2019-11-10 RX ADMIN — ACETAMINOPHEN 488 MG: 325 TABLET ORAL at 08:14

## 2019-11-10 RX ADMIN — METOPROLOL TARTRATE 12.5 MG: 25 TABLET ORAL at 21:22

## 2019-11-10 RX ADMIN — IPRATROPIUM BROMIDE AND ALBUTEROL SULFATE 3 ML: 2.5; .5 SOLUTION RESPIRATORY (INHALATION) at 15:07

## 2019-11-10 NOTE — PROGRESS NOTES
Progress Note - Catalina Baker 82 y o  female MRN: [id number]    Unit/Bed#: -01 Encounter: [id number]      Assessment:  1  Chronic diarrhea with possible colitis of unknown etiology/GERD  no further diarrhea today  Patient is complaining however of marked esophageal reflux and Fosamax will be discontinued  2  Dehydration   Receiving IV fluids  3  Generalized weakness  PT/OT  4  Cardiac with history of hypertension, atrial fibrillation   Eliquis, diltiazem, Lasix metoprolol and potassium chloride  5  COPD   Stable  On 0 2  6  Osteoporosis   Will hold all calcium containing products as this may be contributing to diarrhea  7  DJD/osteoarthritis with the right thigh/leg pain    treat symptomatically  8  Anemia  patient now on Venofer  Serum iron will be obtained after her last dose of Venofer  Plan:  As above    Subjective:   See above    Objective:     Vitals: Blood pressure 107/55, pulse 79, temperature 98 °F (36 7 °C), temperature source Tympanic, resp  rate 16, height 5' (1 524 m), weight 70 5 kg (155 lb 6 8 oz), SpO2 99 %, not currently breastfeeding  ,Body mass index is 30 35 kg/m²        Intake/Output Summary (Last 24 hours) at 11/10/2019 1645  Last data filed at 11/10/2019 0646  Gross per 24 hour   Intake 1220 ml   Output 1000 ml   Net 220 ml       Physical Exam: /55 (BP Location: Right arm)   Pulse 79   Temp 98 °F (36 7 °C) (Tympanic)   Resp 16   Ht 5' (1 524 m)   Wt 70 5 kg (155 lb 6 8 oz)   LMP  (LMP Unknown)   SpO2 99%   BMI 30 35 kg/m²     General Appearance:    Alert, cooperative, no distress, appears stated age   Head:    Normocephalic, without obvious abnormality, atraumatic   Eyes:    PERRL, conjunctiva/corneas clear, EOM's intact, fundi     benign, both eyes           Throat:   Lips, mucosa, and tongue normal; teeth and gums normal   Neck:   Supple, symmetrical, trachea midline, no adenopathy;     thyroid:  no enlargement/tenderness/nodules; no carotid    bruit or JVD   Back:     Symmetric, no curvature, ROM normal, no CVA tenderness   Lungs:     Clear to auscultation bilaterally, respirations unlabored   Chest Wall:    No tenderness or deformity    Heart:    Regular rate and rhythm, S1 and S2 normal, no murmur, rub   or gallop       Abdomen:     Soft, non-tender, bowel sounds active all four quadrants,     no masses, no organomegaly           Extremities:   Extremities normal, atraumatic, no cyanosis or edema   Pulses:   2+ and symmetric all extremities   Skin:   Skin color, texture, turgor normal, no rashes or lesions   Lymph nodes:   Cervical, supraclavicular, and axillary nodes normal   Neurologic:   CNII-XII intact, normal strength, sensation and reflexes     throughout        Invasive Devices     Peripheral Intravenous Line            Peripheral IV 11/07/19 Right;Dorsal (posterior) Forearm 3 days                Lab, Imaging and other studies: I have personally reviewed pertinent reports

## 2019-11-10 NOTE — PHYSICAL THERAPY NOTE
PT Treatment Note     11/10/19 1059   Pain Assessment   Pain Assessment No/denies pain   Restrictions/Precautions   Weight Bearing Precautions Per Order No   Other Precautions Fall Risk;O2;Multiple lines;Contact/isolation   General   Chart Reviewed Yes   Response to Previous Treatment Patient with no complaints from previous session  Family/Caregiver Present No   Cognition   Overall Cognitive Status WFL   Arousal/Participation Alert; Cooperative   Attention Within functional limits   Orientation Level Oriented X4   Following Commands Follows all commands and directions without difficulty   Subjective   Subjective pt was excited to walk   Transfers   Sit to Stand 6  Modified independent   Stand to Sit 6  Modified independent   Toilet transfer 6  Modified independent   Additional items Commode   Ambulation/Elevation   Gait pattern Improper Weight shift; Short stride; Foward flexed   Gait Assistance 5  Supervision   Additional items Verbal cues   Assistive Device Rolling walker   Distance 60 ft  (pt didn't like only walking in room and ended walking early)   Balance   Static Sitting Normal   Dynamic Sitting Good   Static Standing Fair +   Dynamic Standing Fair   Ambulatory Fair   Endurance Deficit   Endurance Deficit Yes   Endurance Deficit Description   (RPE = 3 s/p amb; SOB with exertion)   Activity Tolerance   Activity Tolerance Patient limited by fatigue   Exercises   Quad Sets Sitting;20 reps;AROM; Bilateral   Glute Sets Sitting;20 reps;AROM; Bilateral   Hip Abduction Sitting;20 reps;AROM; Bilateral   Hip Adduction Sitting;20 reps;AROM; Bilateral   Knee AROM Long Arc Quad Sitting;20 reps;AROM; Bilateral   Ankle Pumps Sitting;20 reps;AROM; Bilateral  (also performed ankle ev/inv)   Marching Sitting;20 reps;AROM; Bilateral   Assessment   Prognosis Good   Assessment Pt seen for PT treatment today  Pt was MI with transfers and Supervision with RW 60 ft    Due to precautions we had to stay in the room which upset pt and she terminated amb early  Pt presented with SOB with exertion and and RPE of 3 s/p amb  Pt also performed BLE therex in chair  Pt progressed as evidenced by increaes distance amb and decreaes assistance required with trasfers   Pt would benefit from further PT to increase activity tolerance to improve functional mobitlity to decrease fall risk   Barriers to Discharge Decreased caregiver support   Goals   Patient Goals to walk more   STG Expiration Date 11/16/19   Short Term Goal #1 STGs remain appropriate   PT Treatment Day 4   Plan   Progress Improving as expected   Recommendation   Recommendation Home PT   Equipment Recommended Walker   PT - OK to Discharge Yes   Additional Comments upon conclusion pt in chair with all needs in reach   Dat Lee, PT

## 2019-11-10 NOTE — PLAN OF CARE
Problem: PHYSICAL THERAPY ADULT  Goal: Performs mobility at highest level of function for planned discharge setting  See evaluation for individualized goals  Description  Treatment/Interventions: Functional transfer training, LE strengthening/ROM, Therapeutic exercise, Endurance training, Patient/family training, Equipment eval/education, Bed mobility, Gait training, Spoke to nursing, Spoke to case management  Equipment Recommended: Walker(continue RW use)       See flowsheet documentation for full assessment, interventions and recommendations  Outcome: Progressing  Note:   Prognosis: Good  Problem List: Decreased strength, Decreased endurance, Impaired balance, Decreased mobility, Decreased safety awareness  Assessment: Pt seen for PT treatment today  Pt was MI with transfers and Supervision with RW 60 ft  Due to precautions we had to stay in the room which upset pt and she terminated amb early  Pt presented with SOB with exertion and and RPE of 3 s/p amb  Pt also performed BLE therex in chair  Pt progressed as evidenced by increaes distance amb and decreaes assistance required with trasfers  Pt would benefit from further PT to increase activity tolerance to improve functional mobitlity to decrease fall risk  Barriers to Discharge: Decreased caregiver support     Recommendation: Home PT     PT - OK to Discharge: Yes    See flowsheet documentation for full assessment     Carmen Galdamez, PT

## 2019-11-10 NOTE — PLAN OF CARE
Problem: Potential for Falls  Goal: Patient will remain free of falls  Description  INTERVENTIONS:  - Assess patient frequently for physical needs  -  Identify cognitive and physical deficits and behaviors that affect risk of falls    -  [place] fall precautions as indicated by assessment   - Educate patient/family on patient safety including physical limitations  - Instruct patient to call for assistance with activity based on assessment  - Modify environment to reduce risk of injury  - Consider OT/PT consult to assist with strengthening/mobility  Outcome: Progressing     Problem: PAIN - ADULT  Goal: Verbalizes/displays adequate comfort level or baseline comfort level  Description  Interventions:  - Encourage patient to monitor pain and request assistance  - Assess pain using appropriate pain scale  - Administer analgesics based on type and severity of pain and evaluate response  - Implement non-pharmacological measures as appropriate and evaluate response  - Consider cultural and social influences on pain and pain management  - Notify physician/advanced practitioner if interventions unsuccessful or patient reports new pain  Outcome: Progressing     Problem: INFECTION - ADULT  Goal: Absence or prevention of progression during hospitalization  Description  INTERVENTIONS:  - Assess and monitor for signs and symptoms of infection  - Monitor lab/diagnostic results  - Monitor all insertion sites, i e  indwelling lines, tubes, and drains  - Monitor endotracheal if appropriate and nasal secretions for changes in amount and color  - [place] appropriate cooling/warming therapies per order  - Administer medications as ordered  - Instruct and encourage patient and family to use good hand hygiene technique  - Identify and instruct in appropriate isolation precautions for identified infection/condition  Outcome: Progressing  Goal: Absence of fever/infection during neutropenic period  Description  INTERVENTIONS:  - Monitor WBC    Outcome: Progressing     Problem: SAFETY ADULT  Goal: Maintain or return to baseline ADL function  Description  INTERVENTIONS:  -  Assess patient's ability to carry out ADLs; assess patient's baseline for ADL function and identify physical deficits which impact ability to perform ADLs (bathing, care of mouth/teeth, toileting, grooming, dressing, etc )  - Assess/evaluate cause of self-care deficits   - Assess range of motion  - Assess patient's mobility; develop plan if impaired  - Assess patient's need for assistive devices and provide as appropriate  - Encourage maximum independence but intervene and supervise when necessary  - Involve family in performance of ADLs  - Assess for home care needs following discharge   - Consider OT consult to assist with ADL evaluation and planning for discharge  - Provide patient education as appropriate  Outcome: Progressing  Goal: Maintain or return mobility status to optimal level  Description  INTERVENTIONS:  - Assess patient's baseline mobility status (ambulation, transfers, stairs, etc )    - Identify cognitive and physical deficits and behaviors that affect mobility  - Identify mobility aids required to assist with transfers and/or ambulation (gait belt, sit-to-stand, lift, walker, cane, etc )  - [place] fall precautions as indicated by assessment  - Record patient progress and toleration of activity level on Mobility SBAR; progress patient to next Phase/Stage  - Instruct patient to call for assistance with activity based on assessment  - Consider rehabilitation consult to assist with strengthening/weightbearing, etc   Outcome: Progressing     Problem: DISCHARGE PLANNING  Goal: Discharge to home or other facility with appropriate resources  Description  INTERVENTIONS:  - Identify barriers to discharge w/patient and caregiver  - Arrange for needed discharge resources and transportation as appropriate  - Identify discharge learning needs (meds, wound care, etc )  - Arrange for interpretive services to assist at discharge as needed  - Refer to Case Management Department for coordinating discharge planning if the patient needs post-hospital services based on physician/advanced practitioner order or complex needs related to functional status, cognitive ability, or social support system  Outcome: Progressing     Problem: Knowledge Deficit  Goal: Patient/family/caregiver demonstrates understanding of disease process, treatment plan, medications, and discharge instructions  Description  Complete learning assessment and assess knowledge base    Interventions:  - Provide teaching at level of understanding  - Provide teaching via preferred learning methods  Outcome: Progressing     Problem: GASTROINTESTINAL - ADULT  Goal: Minimal or absence of nausea and/or vomiting  Description  INTERVENTIONS:  - Administer IV fluids if ordered to ensure adequate hydration    - Administer ordered antiemetic medications as needed  - Provide nonpharmacologic comfort measures as appropriate  - Consider nutrition services referral to assist patient with adequate nutrition and appropriate food choices   Outcome: Progressing  Goal: Maintains or returns to baseline bowel function  Description  INTERVENTIONS:  - Assess bowel function  - Encourage oral fluids to ensure adequate hydration  - Administer IV fluids if ordered to ensure adequate hydration  - Administer ordered medications as needed  - Encourage mobilization and activity  - Consider nutritional services referral to assist patient with adequate nutrition and appropriate food choices  Outcome: Progressing  Goal: Maintains adequate nutritional intake  Description  INTERVENTIONS:  - Monitor percentage of each meal consumed  - Identify factors contributing to decreased intake, treat as appropriate  - Assist with meals as needed  - Monitor I&O, weight, and lab values if indicated  - Obtain nutrition services referral as needed  Outcome: Progressing Problem: DISCHARGE PLANNING - CARE MANAGEMENT  Goal: Discharge to post-acute care or home with appropriate resources  Description  INTERVENTIONS:  - Conduct assessment to determine patient/family and health care team treatment goals, and need for post-acute services based on payer coverage, community resources, and patient preferences, and barriers to discharge  - Address psychosocial, clinical, and financial barriers to discharge as identified in assessment in conjunction with the patient/family and health care team  - Arrange appropriate level of post-acute services according to patient's   needs and preference and payer coverage in collaboration with the physician and health care team  - Communicate with and update the patient/family, physician, and health care team regarding progress on the discharge plan  - Arrange appropriate transportation to post-acute venues    Pt's goal is to return home with [facility]   Outcome: Progressing

## 2019-11-10 NOTE — PLAN OF CARE
Problem: OCCUPATIONAL THERAPY ADULT  Goal: Performs self-care activities at highest level of function for planned discharge setting  See evaluation for individualized goals  Description  Treatment Interventions: ADL retraining, Functional transfer training, UE strengthening/ROM, Cognitive reorientation, Endurance training, Compensatory technique education, Energy conservation          See flowsheet documentation for full assessment, interventions and recommendations  Outcome: Progressing  Note:   Limitation: Decreased ADL status, Decreased UE strength, Decreased Safe judgement during ADL, Decreased cognition, Decreased endurance, Decreased self-care trans, Decreased high-level ADLs  Prognosis: Good  Assessment: Patient participated in Skilled OT session this date with interventions consisting of therapeutic exercise to: increase functional use of BUEs, increase BUE muscle strength  and  therapeutic activities to: increase activity tolerance   Patient agreeable to OT treatment session, upon arrival patient was found seated OOB to Chair  In comparison to previous session, patient with improvements in toilet transfers   Patient requiring ocassional safety reminders  Patient continues to be functioning below baseline level, occupational performance remains limited secondary to factors listed above and increased risk for falls and injury  From OT standpoint, recommendation at time of d/c would be Home OT  Patient to benefit from continued Occupational Therapy treatment while in the hospital to address deficits as defined above and maximize level of functional independence with ADLs and functional mobility        OT Discharge Recommendation: Home OT  OT - OK to Discharge: Yes     Flora Matos OT

## 2019-11-10 NOTE — OCCUPATIONAL THERAPY NOTE
Occupational Therapy Treatment Note      Neris Garzon    11/10/2019    Principal Problem:    Diarrhea  Active Problems:    Diverticulosis of colon      Past Medical History:   Diagnosis Date    Asthma     Atrial fibrillation     Blurred vision     Cardiac disease     Colitis     COPD (chronic obstructive pulmonary disease)     Diverticulosis     DJD (degenerative joint disease)     Emphysema lung     Gait abnormality        Past Surgical History:   Procedure Laterality Date    BLADDER SURGERY      COLON SURGERY      COLONOSCOPY W/ POLYPECTOMY N/A 1/21/2019    Procedure: COLONOSCOPY W/ POLYPECTOMY;  Surgeon: Elvira Hale MD;  Location: [facility]; Service: General    SMALL INTESTINE SURGERY          11/10/19 1032   Restrictions/Precautions   Weight Bearing Precautions Per Order No   Other Precautions Contact/isolation;Multiple lines;O2;Fall Risk   Pain Assessment   Pain Assessment No/denies pain   Transfers   Sit to Stand 6  Modified independent   Stand to Sit 6  Modified independent   Toilet transfer 6  Modified independent   Additional items Commode   Functional Mobility   Functional Mobility 4  Minimal assistance   Additional Comments   (safety and management of O2 tank and IV pole)   Therapeutic Excerise-Strength   UE Strength Yes   Right Upper Extremity- Strength   R Shoulder Flexion; Extension   R Elbow Elbow flexion;Elbow extension   R Weight/Reps/Sets 10x2   RUE Strength Comment pt performed BUE chair pushup 8x   Left Upper Extremity-Strength   L Shoulder Flexion; Extension   L Elbow Elbow flexion;Elbow extension   L Weights/Reps/Sets 10x2   Cognition   Overall Cognitive Status WFL   Arousal/Participation Alert; Cooperative   Attention Within functional limits   Orientation Level Oriented X4   Following Commands Follows all commands and directions without difficulty   Assessment   Assessment Patient participated in Skilled OT session this date with interventions consisting of therapeutic exercise to: increase functional use of BUEs, increase BUE muscle strength  and  therapeutic activities to: increase activity tolerance   Patient agreeable to OT treatment session, upon arrival patient was found seated OOB to Chair  In comparison to previous session, patient with improvements in toilet transfers   Patient requiring ocassional safety reminders  Patient continues to be functioning below baseline level, occupational performance remains limited secondary to factors listed above and increased risk for falls and injury  From OT standpoint, recommendation at time of d/c would be Home OT  Patient to benefit from continued Occupational Therapy treatment while in the hospital to address deficits as defined above and maximize level of functional independence with ADLs and functional mobility  Plan   Treatment Interventions ADL retraining;Functional transfer training;UE strengthening/ROM; Endurance training;Patient/family training   Goal Expiration Date 11/16/19   OT Treatment Day 1   OT Frequency 3-5x/wk   Recommendation   OT Discharge Recommendation Home OT   OT - OK to Discharge Yes   Hannah Capellan, OT

## 2019-11-11 LAB
BACTERIA WND AEROBE CULT: ABNORMAL
GRAM STN SPEC: ABNORMAL
IRON SERPL-MCNC: 129 UG/DL (ref 50–170)

## 2019-11-11 PROCEDURE — 94760 N-INVAS EAR/PLS OXIMETRY 1: CPT

## 2019-11-11 PROCEDURE — 83540 ASSAY OF IRON: CPT | Performed by: INTERNAL MEDICINE

## 2019-11-11 PROCEDURE — 97110 THERAPEUTIC EXERCISES: CPT

## 2019-11-11 PROCEDURE — 94640 AIRWAY INHALATION TREATMENT: CPT

## 2019-11-11 PROCEDURE — 97116 GAIT TRAINING THERAPY: CPT

## 2019-11-11 RX ADMIN — PSYLLIUM HUSK 1 PACKET: 3.4 POWDER ORAL at 21:33

## 2019-11-11 RX ADMIN — POTASSIUM CHLORIDE AND SODIUM CHLORIDE 100 ML/HR: 900; 150 INJECTION, SOLUTION INTRAVENOUS at 00:40

## 2019-11-11 RX ADMIN — ACETAMINOPHEN 488 MG: 325 TABLET ORAL at 13:38

## 2019-11-11 RX ADMIN — METOPROLOL TARTRATE 12.5 MG: 25 TABLET ORAL at 09:16

## 2019-11-11 RX ADMIN — PSYLLIUM HUSK 1 PACKET: 3.4 POWDER ORAL at 09:15

## 2019-11-11 RX ADMIN — PANTOPRAZOLE SODIUM 40 MG: 40 TABLET, DELAYED RELEASE ORAL at 05:30

## 2019-11-11 RX ADMIN — ACETAMINOPHEN 488 MG: 325 TABLET ORAL at 06:10

## 2019-11-11 RX ADMIN — POTASSIUM CHLORIDE AND SODIUM CHLORIDE 100 ML/HR: 900; 150 INJECTION, SOLUTION INTRAVENOUS at 22:18

## 2019-11-11 RX ADMIN — DILTIAZEM HYDROCHLORIDE 240 MG: 240 CAPSULE, COATED, EXTENDED RELEASE ORAL at 09:16

## 2019-11-11 RX ADMIN — IPRATROPIUM BROMIDE AND ALBUTEROL SULFATE 3 ML: 2.5; .5 SOLUTION RESPIRATORY (INHALATION) at 08:48

## 2019-11-11 RX ADMIN — CHOLESTYRAMINE 4 G: 4 POWDER, FOR SUSPENSION ORAL at 17:17

## 2019-11-11 RX ADMIN — IPRATROPIUM BROMIDE AND ALBUTEROL SULFATE 3 ML: 2.5; .5 SOLUTION RESPIRATORY (INHALATION) at 20:24

## 2019-11-11 RX ADMIN — POTASSIUM CHLORIDE 20 MEQ: 1500 TABLET, EXTENDED RELEASE ORAL at 09:16

## 2019-11-11 RX ADMIN — APIXABAN 5 MG: 5 TABLET, FILM COATED ORAL at 09:16

## 2019-11-11 RX ADMIN — CHOLESTYRAMINE 4 G: 4 POWDER, FOR SUSPENSION ORAL at 09:15

## 2019-11-11 RX ADMIN — IRON SUCROSE 200 MG: 20 INJECTION, SOLUTION INTRAVENOUS at 09:16

## 2019-11-11 RX ADMIN — POTASSIUM CHLORIDE AND SODIUM CHLORIDE 100 ML/HR: 900; 150 INJECTION, SOLUTION INTRAVENOUS at 11:51

## 2019-11-11 RX ADMIN — APIXABAN 5 MG: 5 TABLET, FILM COATED ORAL at 17:18

## 2019-11-11 RX ADMIN — PSYLLIUM HUSK 1 PACKET: 3.4 POWDER ORAL at 17:17

## 2019-11-11 RX ADMIN — SULFAMETHOXAZOLE AND TRIMETHOPRIM 1 TABLET: 800; 160 TABLET ORAL at 09:17

## 2019-11-11 RX ADMIN — IPRATROPIUM BROMIDE AND ALBUTEROL SULFATE 3 ML: 2.5; .5 SOLUTION RESPIRATORY (INHALATION) at 13:31

## 2019-11-11 RX ADMIN — MONTELUKAST SODIUM 10 MG: 10 TABLET, COATED ORAL at 21:33

## 2019-11-11 RX ADMIN — ACETAMINOPHEN 488 MG: 325 TABLET ORAL at 21:33

## 2019-11-11 RX ADMIN — SULFAMETHOXAZOLE AND TRIMETHOPRIM 1 TABLET: 800; 160 TABLET ORAL at 21:33

## 2019-11-11 NOTE — PHYSICAL THERAPY NOTE
11/11/19 0951   Pain Assessment   Pain Assessment No/denies pain   Pain Score No Pain   Restrictions/Precautions   Weight Bearing Precautions Per Order No   Other Precautions Multiple lines;O2;Fall Risk   General   Chart Reviewed Yes   Response to Previous Treatment Patient with no complaints from previous session  Family/Caregiver Present No   Cognition   Overall Cognitive Status WFL   Arousal/Participation Alert; Cooperative   Attention Attends with cues to redirect   Orientation Level Oriented X4   Memory Decreased short term memory   Following Commands Follows one step commands without difficulty   Bed Mobility   Additional Comments pt recieved OOB in chair   Transfers   Sit to Stand 6  Modified independent   Additional items Assist x 1; Armrests; Verbal cues   Stand to Sit 6  Modified independent   Additional items Assist x 1; Armrests; Verbal cues   Ambulation/Elevation   Gait pattern Improper Weight shift;Decreased foot clearance; Short stride   Gait Assistance 5  Supervision   Additional items Assist x 1;Verbal cues   Assistive Device Rolling walker   Distance 20 ftx4 in room   Stair Management Assistance Not tested   Balance   Static Sitting Normal   Dynamic Sitting Good   Static Standing Fair +   Dynamic Standing Fair   Ambulatory Fair   Endurance Deficit   Endurance Deficit Yes   Endurance Deficit Description SOB with exertion   Activity Tolerance   Activity Tolerance Patient limited by fatigue   Exercises   Quad Sets Sitting;AROM; Bilateral  (30 reps)   Glute Sets Sitting;AROM; Bilateral  (30 reps)   Hip Flexion Sitting;AROM; Bilateral  (30 reps)   Hip Abduction Sitting;AROM; Bilateral  (30 reps)   Hip Adduction Sitting;AROM; Bilateral  (30 reps)   Knee AROM [employer]; Bilateral  (30 reps)   Ankle Pumps Sitting;AROM; Bilateral  (30 reps)   Marching Sitting;AROM; Bilateral  (30 reps)   Assessment   Prognosis Good   Problem List Decreased strength;Decreased endurance; Impaired balance;Decreased safety awareness   Assessment Pt seen for PT treatment session this date with interventions consisting of gait training w/ emphasis on improving pt's ability to ambulate level surfaces x 20 ftx4 in room with SBA provided by therapist with RW and Therapeutic exercise consisting of: AROM 2 sets of 15 reps B LE in sitting position  Pt agreeable to PT treatment session upon arrival, pt found seated OOB in chair, in no apparent distress, A&O x 4 and responsive  In comparison to previous session, pt with improvements in tolerance to therapy session  Post session: pt returned back to recliner and all needs in [facility] Continue to recommend Home PT at time of d/c in order to maximize pt's functional independence and safety w/ mobility  Pt continues to be functioning below baseline level, and remains limited 2* factors listed above and including decreased strength, decreased balance, decreased functional activity tolerance and decreased safety awareness  PT will continue to see pt while here in order to address the deficits listed above and provide interventions consistent w/ POC in effort to achieve STGs  Barriers to Discharge Decreased caregiver support   Goals   PT Treatment Day 5   Plan   Treatment/Interventions Functional transfer training;LE strengthening/ROM; Therapeutic exercise; Endurance training;Gait training   Progress Improving as expected   PT Frequency Other (Comment)  (3-5x/wk)   Recommendation   Recommendation Home PT   Equipment Recommended Walker   PT - OK to Discharge Yes   Additional Comments upon conclusion pt resting in chair with all needs in [facility]   [address], PTA

## 2019-11-11 NOTE — SOCIAL WORK
Pt not yet stable for discharge per Fide Barber  Discharge plan remains for pt to return home with POA transporting  Kathy Molina will follow up upon discharge to resume SN and PT services in the home  CM to follow

## 2019-11-11 NOTE — PLAN OF CARE
Problem: PHYSICAL THERAPY ADULT  Goal: Performs mobility at highest level of function for planned discharge setting  See evaluation for individualized goals  Description  Treatment/Interventions: Functional transfer training, LE strengthening/ROM, Therapeutic exercise, Endurance training, Patient/family training, Equipment eval/education, Bed mobility, Gait training, Spoke to nursing, Spoke to case management  Equipment Recommended: Walker(continue RW use)       See flowsheet documentation for full assessment, interventions and recommendations  Outcome: Progressing  Note:   Prognosis: Good  Problem List: Decreased strength, Decreased endurance, Impaired balance, Decreased safety awareness  Assessment: Pt seen for PT treatment session this date with interventions consisting of gait training w/ emphasis on improving pt's ability to ambulate level surfaces x 20 ftx4 in room with SBA provided by therapist with RW and Therapeutic exercise consisting of: AROM 2 sets of 15 reps B LE in sitting position  Pt agreeable to PT treatment session upon arrival, pt found seated OOB in chair, in no apparent distress, A&O x 4 and responsive  In comparison to previous session, pt with improvements in tolerance to therapy session  Post session: pt returned back to recliner and all needs in reach Continue to recommend Home PT at time of d/c in order to maximize pt's functional independence and safety w/ mobility  Pt continues to be functioning below baseline level, and remains limited 2* factors listed above and including decreased strength, decreased balance, decreased functional activity tolerance and decreased safety awareness  PT will continue to see pt while here in order to address the deficits listed above and provide interventions consistent w/ POC in effort to achieve STGs  Barriers to Discharge: Decreased caregiver support     Recommendation: Home PT     PT - OK to Discharge:  Yes  Divine Schneider, PTA    See flowsheet documentation for full assessment

## 2019-11-11 NOTE — PROGRESS NOTES
Shwetha,#  HEZ:[id number] F  IWF:[id number]    BZ  Adm Date: 2019 1458  2:58 PM   ATT PHY: [phone]         Subjective     The patient was seen after reviewing the chart and discussing the case with caring staff  Today during our encounter, the patient reported not having a BM in 2 days up until this morning when she had two seemingly loose stools 10 minutes apart  As much as she would like to go home, she is nervous about this surge in loose stools again and wants to wait to see that it has resolved  Iron level today improved to 129  Objective     Vitals:    19 0848   BP:    Pulse:    Resp:    Temp:    SpO2: 97%       General Appearance: Awake and Alert  No acute distress  HEENT: Normocephalic, atraumatic  PERRLA, EOMI, MMM  Heart: RRR, no murmurs  Normal S1 and S2   Lungs: CTA bilaterally with fair air entry  Assessment     Grace Tadeo is a(n) 82 y  o  year old female with intractable diarrhea with dehydration     1  Chronic diarrhea with possible colitis of unknown etiology/GERD  Dr Gaurav House on consult, and has performed the colonoscopy, with the polyp analyses pending   All other workup was found to be negative including Celiac profile, C diff, fecal leukocytes, ova and parasites  Continue Metamucil, Questran, and lactose-free diet  Patient is on Protonix and may gets Zofran on as needed basis  Will check BMP tomorrow  2  Dehydration   Continue IV fluids normal saline with potassium chloride at 100 cc an hour  Will check BMP tomorrow  3  Generalized weakness  PT/OT  Recommending Home PT  4  Cardiac with history of hypertension, atrial fibrillation   Eliquis, diltiazem, Lasix metoprolol and potassium chloride  5  COPD   Breo Ellipta, Combivent, albuterol, ipratropium nebs and montelukast along with oxygen on as-needed basis via nasal cannula  6  Osteoporosis   Patient is on alendronate along with calcium and vitamin-D   7  DJD/osteoarthritis with the right thigh/leg pain   Voltaren gel along with heating pad over the leg   Patient may get Tylenol on as needed basis  8  Iron Deficiency Anemia  Continue Venofer  CBC tomorrow  9  Right Arm Phlebitis  Bactrim DS for 7 days  The patient was discussed with Dr Maru Garcia and he is in agreement with the above note

## 2019-11-12 VITALS
RESPIRATION RATE: 18 BRPM | DIASTOLIC BLOOD PRESSURE: 58 MMHG | HEART RATE: 88 BPM | SYSTOLIC BLOOD PRESSURE: 122 MMHG | OXYGEN SATURATION: 91 % | WEIGHT: 155.42 LBS | HEIGHT: 60 IN | TEMPERATURE: 97.8 F | BODY MASS INDEX: 30.51 KG/M2

## 2019-11-12 LAB
ANION GAP SERPL CALCULATED.3IONS-SCNC: 9 MMOL/L (ref 4–13)
BUN SERPL-MCNC: 6 MG/DL (ref 7–25)
CALCIUM SERPL-MCNC: 9.2 MG/DL (ref 8.6–10.5)
CHLORIDE SERPL-SCNC: 106 MMOL/L (ref 98–107)
CO2 SERPL-SCNC: 22 MMOL/L (ref 21–31)
CREAT SERPL-MCNC: 0.5 MG/DL (ref 0.6–1.2)
ERYTHROCYTE [DISTWIDTH] IN BLOOD BY AUTOMATED COUNT: 16.8 % (ref 11.5–14.5)
GFR SERPL CREATININE-BSD FRML MDRD: 90 ML/MIN/1.73SQ M
GLUCOSE SERPL-MCNC: 113 MG/DL (ref 65–99)
HCT VFR BLD AUTO: 35.4 % (ref 42–47)
HGB BLD-MCNC: 11.4 G/DL (ref 12–16)
MCH RBC QN AUTO: 26.4 PG (ref 26–34)
MCHC RBC AUTO-ENTMCNC: 32 G/DL (ref 31–37)
MCV RBC AUTO: 83 FL (ref 81–99)
PLATELET # BLD AUTO: 368 THOUSANDS/UL (ref 149–390)
PMV BLD AUTO: 7.3 FL (ref 8.6–11.7)
POTASSIUM SERPL-SCNC: 4.3 MMOL/L (ref 3.5–5.5)
RBC # BLD AUTO: 4.3 MILLION/UL (ref 3.9–5.2)
SODIUM SERPL-SCNC: 137 MMOL/L (ref 134–143)
WBC # BLD AUTO: 6.8 THOUSAND/UL (ref 4.8–10.8)

## 2019-11-12 PROCEDURE — 94760 N-INVAS EAR/PLS OXIMETRY 1: CPT

## 2019-11-12 PROCEDURE — 85007 BL SMEAR W/DIFF WBC COUNT: CPT | Performed by: PHYSICIAN ASSISTANT

## 2019-11-12 PROCEDURE — 97110 THERAPEUTIC EXERCISES: CPT

## 2019-11-12 PROCEDURE — 94640 AIRWAY INHALATION TREATMENT: CPT

## 2019-11-12 PROCEDURE — 80048 BASIC METABOLIC PNL TOTAL CA: CPT | Performed by: PHYSICIAN ASSISTANT

## 2019-11-12 PROCEDURE — 85027 COMPLETE CBC AUTOMATED: CPT | Performed by: PHYSICIAN ASSISTANT

## 2019-11-12 RX ORDER — MONTELUKAST SODIUM 10 MG/1
10 TABLET ORAL
Qty: 30 TABLET | Refills: 1 | Status: SHIPPED | OUTPATIENT
Start: 2019-11-12 | End: 2020-04-27 | Stop reason: SDUPTHER

## 2019-11-12 RX ORDER — ALBUTEROL SULFATE 90 UG/1
2 AEROSOL, METERED RESPIRATORY (INHALATION) EVERY 6 HOURS PRN
Qty: 1 INHALER | Refills: 1 | Status: SHIPPED | OUTPATIENT
Start: 2019-11-12 | End: 2020-03-22 | Stop reason: HOSPADM

## 2019-11-12 RX ORDER — IRON POLYSACCHARIDE COMPLEX 150 MG
150 CAPSULE ORAL 2 TIMES DAILY
Qty: 60 CAPSULE | Refills: 1 | Status: SHIPPED | OUTPATIENT
Start: 2019-11-12 | End: 2020-08-11 | Stop reason: SDUPTHER

## 2019-11-12 RX ORDER — SULFAMETHOXAZOLE AND TRIMETHOPRIM 800; 160 MG/1; MG/1
1 TABLET ORAL EVERY 12 HOURS SCHEDULED
Qty: 10 TABLET | Refills: 0 | Status: SHIPPED | OUTPATIENT
Start: 2019-11-12 | End: 2019-11-17

## 2019-11-12 RX ORDER — PANTOPRAZOLE SODIUM 40 MG/1
40 TABLET, DELAYED RELEASE ORAL
Qty: 30 TABLET | Refills: 1 | Status: ON HOLD | OUTPATIENT
Start: 2019-11-13 | End: 2020-03-22 | Stop reason: SDUPTHER

## 2019-11-12 RX ORDER — DILTIAZEM HYDROCHLORIDE 240 MG/1
240 CAPSULE, COATED, EXTENDED RELEASE ORAL DAILY
Qty: 30 CAPSULE | Refills: 1 | Status: SHIPPED | OUTPATIENT
Start: 2019-11-13 | End: 2019-12-09 | Stop reason: SDUPTHER

## 2019-11-12 RX ORDER — CHOLESTYRAMINE LIGHT 4 G/5.7G
4 POWDER, FOR SUSPENSION ORAL 2 TIMES DAILY
Qty: 60 TABLET | Refills: 1 | Status: SHIPPED | OUTPATIENT
Start: 2019-11-12 | End: 2020-03-22 | Stop reason: HOSPADM

## 2019-11-12 RX ORDER — LOPERAMIDE HYDROCHLORIDE 2 MG/1
2 CAPSULE ORAL 3 TIMES DAILY PRN
Qty: 30 CAPSULE | Refills: 1 | Status: SHIPPED | OUTPATIENT
Start: 2019-11-12 | End: 2020-03-22 | Stop reason: HOSPADM

## 2019-11-12 RX ORDER — POTASSIUM CHLORIDE 750 MG/1
10 CAPSULE, EXTENDED RELEASE ORAL DAILY
Qty: 30 CAPSULE | Refills: 1 | Status: SHIPPED | OUTPATIENT
Start: 2019-11-12 | End: 2020-05-26

## 2019-11-12 RX ORDER — IPRATROPIUM BROMIDE AND ALBUTEROL SULFATE 2.5; .5 MG/3ML; MG/3ML
3 SOLUTION RESPIRATORY (INHALATION)
Qty: 300 ML | Refills: 0 | Status: SHIPPED | OUTPATIENT
Start: 2019-11-12 | End: 2021-01-01 | Stop reason: SDUPTHER

## 2019-11-12 RX ADMIN — APIXABAN 5 MG: 5 TABLET, FILM COATED ORAL at 10:59

## 2019-11-12 RX ADMIN — ACETAMINOPHEN 488 MG: 325 TABLET ORAL at 05:44

## 2019-11-12 RX ADMIN — CHOLESTYRAMINE 4 G: 4 POWDER, FOR SUSPENSION ORAL at 11:02

## 2019-11-12 RX ADMIN — PSYLLIUM HUSK 1 PACKET: 3.4 POWDER ORAL at 11:02

## 2019-11-12 RX ADMIN — SULFAMETHOXAZOLE AND TRIMETHOPRIM 1 TABLET: 800; 160 TABLET ORAL at 10:59

## 2019-11-12 RX ADMIN — IPRATROPIUM BROMIDE AND ALBUTEROL SULFATE 3 ML: 2.5; .5 SOLUTION RESPIRATORY (INHALATION) at 07:20

## 2019-11-12 RX ADMIN — DILTIAZEM HYDROCHLORIDE 240 MG: 240 CAPSULE, COATED, EXTENDED RELEASE ORAL at 10:59

## 2019-11-12 RX ADMIN — PANTOPRAZOLE SODIUM 40 MG: 40 TABLET, DELAYED RELEASE ORAL at 05:44

## 2019-11-12 RX ADMIN — METOPROLOL TARTRATE 12.5 MG: 25 TABLET ORAL at 10:59

## 2019-11-12 RX ADMIN — POTASSIUM CHLORIDE 20 MEQ: 1500 TABLET, EXTENDED RELEASE ORAL at 10:58

## 2019-11-12 NOTE — NURSING NOTE
Patient medically cleared to be DC home today, Iv removed, DC instructions reviewed and given to patient  Patient instructed to follow up with all MD appointments and to continue to take all med's as prescribed

## 2019-11-12 NOTE — PLAN OF CARE
Problem: Potential for Falls  Goal: Patient will remain free of falls  Description  INTERVENTIONS:  - Assess patient frequently for physical needs  -  Identify cognitive and physical deficits and behaviors that affect risk of falls    -  [place] fall precautions as indicated by assessment   - Educate patient/family on patient safety including physical limitations  - Instruct patient to call for assistance with activity based on assessment  - Modify environment to reduce risk of injury  - Consider OT/PT consult to assist with strengthening/mobility  Outcome: Adequate for Discharge     Problem: PAIN - ADULT  Goal: Verbalizes/displays adequate comfort level or baseline comfort level  Description  Interventions:  - Encourage patient to monitor pain and request assistance  - Assess pain using appropriate pain scale  - Administer analgesics based on type and severity of pain and evaluate response  - Implement non-pharmacological measures as appropriate and evaluate response  - Consider cultural and social influences on pain and pain management  - Notify physician/advanced practitioner if interventions unsuccessful or patient reports new pain  Outcome: Adequate for Discharge     Problem: INFECTION - ADULT  Goal: Absence or prevention of progression during hospitalization  Description  INTERVENTIONS:  - Assess and monitor for signs and symptoms of infection  - Monitor lab/diagnostic results  - Monitor all insertion sites, i e  indwelling lines, tubes, and drains  - Monitor endotracheal if appropriate and nasal secretions for changes in amount and color  - [place] appropriate cooling/warming therapies per order  - Administer medications as ordered  - Instruct and encourage patient and family to use good hand hygiene technique  - Identify and instruct in appropriate isolation precautions for identified infection/condition  Outcome: Adequate for Discharge  Goal: Absence of fever/infection during neutropenic period  Description  INTERVENTIONS:  - Monitor WBC    Outcome: Adequate for Discharge     Problem: SAFETY ADULT  Goal: Maintain or return to baseline ADL function  Description  INTERVENTIONS:  -  Assess patient's ability to carry out ADLs; assess patient's baseline for ADL function and identify physical deficits which impact ability to perform ADLs (bathing, care of mouth/teeth, toileting, grooming, dressing, etc )  - Assess/evaluate cause of self-care deficits   - Assess range of motion  - Assess patient's mobility; develop plan if impaired  - Assess patient's need for assistive devices and provide as appropriate  - Encourage maximum independence but intervene and supervise when necessary  - Involve family in performance of ADLs  - Assess for home care needs following discharge   - Consider OT consult to assist with ADL evaluation and planning for discharge  - Provide patient education as appropriate  Outcome: Adequate for Discharge  Goal: Maintain or return mobility status to optimal level  Description  INTERVENTIONS:  - Assess patient's baseline mobility status (ambulation, transfers, stairs, etc )    - Identify cognitive and physical deficits and behaviors that affect mobility  - Identify mobility aids required to assist with transfers and/or ambulation (gait belt, sit-to-stand, lift, walker, cane, etc )  - [place] fall precautions as indicated by assessment  - Record patient progress and toleration of activity level on Mobility SBAR; progress patient to next Phase/Stage  - Instruct patient to call for assistance with activity based on assessment  - Consider rehabilitation consult to assist with strengthening/weightbearing, etc   Outcome: Adequate for Discharge     Problem: DISCHARGE PLANNING  Goal: Discharge to home or other facility with appropriate resources  Description  INTERVENTIONS:  - Identify barriers to discharge w/patient and caregiver  - Arrange for needed discharge resources and transportation as appropriate  - Identify discharge learning needs (meds, wound care, etc )  - Arrange for interpretive services to assist at discharge as needed  - Refer to Case Management Department for coordinating discharge planning if the patient needs post-hospital services based on physician/advanced practitioner order or complex needs related to functional status, cognitive ability, or social support system  Outcome: Adequate for Discharge     Problem: Knowledge Deficit  Goal: Patient/family/caregiver demonstrates understanding of disease process, treatment plan, medications, and discharge instructions  Description  Complete learning assessment and assess knowledge base    Interventions:  - Provide teaching at level of understanding  - Provide teaching via preferred learning methods  Outcome: Adequate for Discharge     Problem: GASTROINTESTINAL - ADULT  Goal: Minimal or absence of nausea and/or vomiting  Description  INTERVENTIONS:  - Administer IV fluids if ordered to ensure adequate hydration    - Administer ordered antiemetic medications as needed  - Provide nonpharmacologic comfort measures as appropriate  - Consider nutrition services referral to assist patient with adequate nutrition and appropriate food choices   Outcome: Adequate for Discharge  Goal: Maintains or returns to baseline bowel function  Description  INTERVENTIONS:  - Assess bowel function  - Encourage oral fluids to ensure adequate hydration  - Administer IV fluids if ordered to ensure adequate hydration  - Administer ordered medications as needed  - Encourage mobilization and activity  - Consider nutritional services referral to assist patient with adequate nutrition and appropriate food choices  Outcome: Adequate for Discharge  Goal: Maintains adequate nutritional intake  Description  INTERVENTIONS:  - Monitor percentage of each meal consumed  - Identify factors contributing to decreased intake, treat as appropriate  - Assist with meals as needed  - Monitor I&O, weight, and lab values if indicated  - Obtain nutrition services referral as needed  Outcome: Adequate for Discharge     Problem: DISCHARGE PLANNING - CARE MANAGEMENT  Goal: Discharge to post-acute care or home with appropriate resources  Description  INTERVENTIONS:  - Conduct assessment to determine patient/family and health care team treatment goals, and need for post-acute services based on payer coverage, community resources, and patient preferences, and barriers to discharge  - Address psychosocial, clinical, and financial barriers to discharge as identified in assessment in conjunction with the patient/family and health care team  - Arrange appropriate level of post-acute services according to patient's   needs and preference and payer coverage in collaboration with the physician and health care team  - Communicate with and update the patient/family, physician, and health care team regarding progress on the discharge plan  - Arrange appropriate transportation to post-acute venues    Pt's goal is to return home with [facility]   Outcome: Adequate for Discharge

## 2019-11-12 NOTE — NURSING NOTE
Pt alert and orientated, not very pleasant, easily irritable  Offers complaints of iv fluids all the time, urinating too much, lights too bright, people are talking, no more tissues  All questions/complaints addressed  Heart is irregular, edema noted to bilat ankles/feet  Lungs are clear and decreased on 2L chronically, sob with exertion  Tylenol given for pain at pt request  Lopressor held due to low bp  Contact precautions maintained  Hourly rounding reviewed, no needs at this time  Call bell within reach

## 2019-11-12 NOTE — PHYSICAL THERAPY NOTE
11/12/19 1107   Pain Assessment   Pain Assessment No/denies pain   Pain Score No Pain   Restrictions/Precautions   Weight Bearing Precautions Per Order No   Other Precautions Contact/isolation; Fall Risk;Multiple lines   General   Chart Reviewed Yes   Response to Previous Treatment Patient with no complaints from previous session  Family/Caregiver Present No   Cognition   Overall Cognitive Status WFL   Arousal/Participation Alert; Cooperative   Attention Within functional limits   Orientation Level Oriented X4   Memory Within functional limits   Following Commands Follows all commands and directions without difficulty   Subjective   Subjective " I finally had a normal BM "   Transfers   Sit to Stand 6  Modified independent   Additional items Assist x 1; Armrests   Stand to Sit 6  Modified independent   Additional items Assist x 1; Armrests   Balance   Static Sitting Good   Dynamic Sitting Fair +   Static Standing Fair +   Dynamic Standing Fair   Endurance Deficit   Endurance Deficit Yes   Endurance Deficit Description JOHNSON   Activity Tolerance   Activity Tolerance Patient limited by fatigue   Exercises   Quad Sets Sitting;25 reps;AROM; Bilateral   Glute Sets Sitting;25 reps;AROM; Bilateral   Hip Flexion Sitting;25 reps;AROM; Bilateral   Hip Abduction Sitting;25 reps;AROM; Bilateral   Hip Adduction Sitting;25 reps;AROM; Bilateral   Knee AROM Long Arc Quad Sitting;AROM;25 reps;Bilateral   Ankle Pumps Sitting;25 reps;AROM; Bilateral   Marching Standing;25 reps;AROM; Bilateral  (x2)   Assessment   Prognosis Good   Problem List Decreased strength;Decreased endurance; Impaired balance;Decreased safety awareness   Assessment Pt  found sitting OOb in stationary chair willing and eager to do some ther ex  Pt  performed all ther ex BLE actively  25 reps as per flow sheet  Pt  did require brief rest periods between ther ex due to fatigue    Pt  then transferd from sit to stand with MI push up from the arms of the chair and grabbing for the RW   Pt  was able to perorm marching while in standing for 25 reps on BLE before reporting fatigue and needing to sit because of JOHNSON  Pt  performed marching activity x2  Pt  refused to ambulate because she reported that she got yelled at by an unknown staff member for going out into the hallway because she is on contact precautions for MRSA of a covered wound on her arm  Pt  tolerated treatment well and was postioned for comfort with all needs left in reach  Pt  will do well at home with [facility]  Continue current POC and progress as tolerated  Goals   Patient Goals to go home and stay there   PT Treatment Day 6   Plan   Treatment/Interventions Functional transfer training;LE strengthening/ROM; Therapeutic exercise; Endurance training;Patient/family training;Bed mobility;Gait training   Progress Progressing toward goals   PT Frequency   (3-5x/wk)   Recommendation   Recommendation Home PT   Equipment Recommended Walker   PT - OK to Discharge Yes   Additional Comments when medically stable   Daryl Carvajal, MALLORY

## 2019-11-12 NOTE — PLAN OF CARE
Problem: PHYSICAL THERAPY ADULT  Goal: Performs mobility at highest level of function for planned discharge setting  See evaluation for individualized goals  Description  Treatment/Interventions: Functional transfer training, LE strengthening/ROM, Therapeutic exercise, Endurance training, Patient/family training, Equipment eval/education, Bed mobility, Gait training, Spoke to nursing, Spoke to case management  Equipment Recommended: Walker(continue RW use)       See flowsheet documentation for full assessment, interventions and recommendations  Outcome: Progressing  Note:   Prognosis: Good  Problem List: Decreased strength, Decreased endurance, Impaired balance, Decreased safety awareness  Assessment: Pt  found sitting OOb in stationary chair willing and eager to do some ther ex  Pt  performed all ther ex BLE actively  25 reps as per flow sheet  Pt  did require brief rest periods between ther ex due to fatigue  Pt  then transferd from sit to stand with MI push up from the arms of the chair and grabbing for the RW   Pt  was able to perorm marching while in standing for 25 reps on BLE before reporting fatigue and needing to sit because of JOHNSON  Pt  performed marching activity x2  Pt  refused to ambulate because she reported that she got yelled at by an unknown staff member for going out into the hallway because she is on contact precautions for MRSA of a covered wound on her arm  Pt  tolerated treatment well and was postioned for comfort with all needs left in reach  Pt  will do well at home with [facility]  Continue current POC and progress as tolerated  Barriers to Discharge: Decreased caregiver support     Recommendation: Home PT     PT - OK to Discharge: Yes    See flowsheet documentation for full assessment     Karole Comfort, PTA

## 2019-11-12 NOTE — DISCHARGE SUMMARY
Discharge Summary - Sharri Caicedo 82 y o  female MRN: [id number]    Unit/Bed#: -01 Encounter: [id number]    Admission Date:   Admission Orders (From admission, onward)     Ordered        11/06/19 1707  Inpatient Admission  Once         11/05/19 1540  Place in Observation (expected length of stay for this patient is less than two midnights)  Once                     Admitting Diagnosis: Diarrhea [R19 7]  Colitis [K52 9]  Diarrhea, unspecified type [R19 7]    HPI/Summary of Hospital Course/Significant Findings: Patient is an 82year old female who presented to [facility]'s ED on 11/5/19 for recurrent intractable diarrhea  Notably, she was admitted on 11/1/19 and discharge on 11/5/19 for the same issue  During this prior admission, she was placed on metamucil (which resolved the patient's diarrhea episodes) and was advised to follow up closely with GI for colonoscopy and biopsies  Although recommendations were made for assisted living and SNF for rehab, the patient continually declined these options, but was agreeable to at least having [facility] with PT  During this hospitalization, the patient was placed on both metamucil, Questran light, and a lactose-restricted diet  With this regimen, she was having (on average) 1-2 formed BM's today, effectively resolving her diarrhea  For prevention of dehydration/electrolyte abnormalities, she was on normal saline with KCl at 100cc/hr  She was also able to receive her colonoscopy in-house, with results showing internal hemorrhoids without bleeding, multiple moderate stool-containing diverticula in the transverse colon, and multiple areas containing polyps (see full report for details)  At the time of discharge, the biopsies have not fully resulted  One complication during her admission was iron deficiency anemia, with H/H as low as 9 2/29 6 and iron level of 24   With use of Venofer IV 200mg once daily x3 doses, however, her H/H increased to 11  4/35 4 with iron level of 129  She has been discharge on Niferex PO 150mg twice per day  Another complication was an IV site infection of the right wrist  Wound culture showed 4+ growht of MRSA, for which she was placed on Bactrim DS every 12 hours, and was instructed to complete this regimen at home  On the day of discharge, the patient was very frustrated and irritated about having to go home, but at the same time was unable to give me any reasons as to why she needs to stay inpatient  She fears that she will have a recurrence in her diarrhea episodes and be forced to come directly back into the ED, to which she made passive threats about suing the hospital if this happens  The  and myself again reiterated our recommendations for assisted living or SNF, to which she again adamantly declined and said "I can make decisions for myself " In this regard, I advised adherence to a high fiber/lactose-restricted diet, compliance with her medication, adequate fluid intake, and close follow up with our [facility] and GI so that the results of the biopsies may be reviewed a long-term treatment plan may be formulated  Towards the end of this conversation, however, she became largely nonverbal other than stating on a few occasions "whatever "    Discharge Diagnosis: Diarrhea, IV site infection, Iron Deficiency Anemia    Condition at Discharge: fair     Discharge instructions/Information to patient and family:   See after visit summary for information provided to patient and family  Provisions for Follow-Up Care:  See after visit summary for information related to follow-up care and any pertinent home health orders  PCP: No primary care provider on file  Disposition: Home    Planned Readmission: No    Discharge Statement   I spent more than 30 minutes discharging the patient  This time was spent on the day of discharge  I had direct contact with the patient on the day of discharge  Additional documentation is required if more than 30 minutes were spent on discharge  Discharge Medications:  See after visit summary for reconciled discharge medications provided to patient and family

## 2019-11-12 NOTE — SOCIAL WORK
Chart reviewed, d/c order written, I received a call from the pt's room stating she needs a ride, I called Chencho Castillo her POA @11:20 am  and I was told she would not be able to transport her and I was to call Cristal Rae called him at  11:25 to # [phone] and he stated he would be able to transport the pt home at 1:30 pm, I was I called Chencho Castillo again at 13:25 and I made her aware that I did set up a ride for her and I made sure she is able to  the pt's rx's that were faxed to [facility] and she  stated she could, she then told me that Jelena Austin was called by the pt and she will be transporting her home, I met with the pt and she stated she cx Renaldo Salas and she called Jelena Austin, pt is refusing SNF, pt is refusing to go to a [facility] or the independent living facility that Derrek aguilera from   [place] [address], we had discussed this her previous admission, cindy was made aware for the d/c, pcp appointment was made, I gave the pt the area of aging # and she was instructed to call if she would like additional services at home, I was not given permission to call, I did call her grand daughter Radha Mcbride at 11:33 am to # [phone] to make her aware of the d/c and made her aware that the pt should not be alone , I made her aware of the frequent admissions, d/c plan was discussed at the care coordination rounds today, pt in agreement with the d/c and d/c plan home, pt was again offered to go to snf for rehab or the independent living and she refused, pt d/c home with [facility]

## 2019-11-13 DIAGNOSIS — Z71.89 COMPLEX CARE COORDINATION: Primary | ICD-10-CM

## 2019-12-09 ENCOUNTER — OFFICE VISIT (OUTPATIENT)
Dept: CARDIOLOGY CLINIC | Facility: CLINIC | Age: 84
End: 2019-12-09
Payer: MEDICARE

## 2019-12-09 VITALS
HEART RATE: 64 BPM | HEIGHT: 60 IN | DIASTOLIC BLOOD PRESSURE: 56 MMHG | SYSTOLIC BLOOD PRESSURE: 120 MMHG | BODY MASS INDEX: 29.06 KG/M2 | WEIGHT: 148 LBS

## 2019-12-09 DIAGNOSIS — I50.9 CONGESTIVE HEART FAILURE (HCC): ICD-10-CM

## 2019-12-09 DIAGNOSIS — J44.9 COPD (CHRONIC OBSTRUCTIVE PULMONARY DISEASE) (HCC): ICD-10-CM

## 2019-12-09 DIAGNOSIS — I48.0 PAROXYSMAL ATRIAL FIBRILLATION (HCC): Primary | ICD-10-CM

## 2019-12-09 PROCEDURE — 99213 OFFICE O/P EST LOW 20 MIN: CPT | Performed by: INTERNAL MEDICINE

## 2019-12-09 RX ORDER — DILTIAZEM HYDROCHLORIDE 240 MG/1
240 CAPSULE, COATED, EXTENDED RELEASE ORAL DAILY
Qty: 90 CAPSULE | Refills: 1 | Status: SHIPPED | OUTPATIENT
Start: 2019-12-09 | End: 2019-12-09 | Stop reason: SDUPTHER

## 2019-12-09 RX ORDER — ALENDRONATE SODIUM 70 MG/1
TABLET ORAL
Refills: 0 | COMMUNITY
Start: 2019-11-19 | End: 2020-07-14 | Stop reason: SDUPTHER

## 2019-12-09 RX ORDER — DILTIAZEM HYDROCHLORIDE 240 MG/1
240 CAPSULE, COATED, EXTENDED RELEASE ORAL DAILY
Qty: 90 CAPSULE | Refills: 0 | Status: SHIPPED | OUTPATIENT
Start: 2019-12-09 | End: 2020-03-22 | Stop reason: HOSPADM

## 2019-12-09 NOTE — PROGRESS NOTES
[facility] [address] [facility], [address]    [place], [address] [facility]   [phone]                                              Cardiology Office Consult  Amina Any, 82 y o  female  YOB: 1935  MRN: [id number] Encounter: [id number]      PCP - No primary care provider on file  Assessment  1  Paroxysmal Atrial Fibrillation   · Echo - 10/7/19 -   LVEF 60%, mild LVH, grade 1 diastolic dysfunction, mild AI  2  Former smoker, smoked 45 pack-years  3  COPD on home O2  4  H/o frequent Diarrhoea    Plan  Paroxysmal Atrial Fibrillation  · Clinically appears to be in NSR with ectopy  · On cardizem , metoprolol 12 5 bid - meds refilled  · On eliquis 5 bid for now - she will review cost with her pharmacy & let us know  · Afib was new during initial hospitalization in 10/2019 for diarrhea, and occurred for short periods in the acute setting  · Monitor for now  · Counseled regarding avoiding caffeine, alcohol, smoking    Follow up in 6 months    History of Present Illness   37-year-old female with a history of COPD, former smoker, comes in as a new patient for establishment of care regarding her paroxysmal atrial fibrillation  She initially presented to the hospital with complaints of elbow pain, after being and successfully treated outpatient for olecranon bursitis  At time of presentation, she was found to be in rapid atrial fibrillation, and was placed on IV Cardizem & eliquis  She had several ED visits, and subsequently had an extended hospitalization for diarrhea  She had sinus with frequent PACs at most times, with brief periods of atrial fibrillation  Her medications were up titrated to Cardizem CD 2 40 mg, metoprolol 12 5 mg b i d , with improvement in symptoms  She was eventually discharged to [facility] rehab         She was discharged to home in late November 2019, and is coming for initial outpatient cardiology consultation     Since discharge, she has been doing well, reports improvement in her diarrhea  She has no active complains of palpitations, chest pain, shortness of breath, dizziness or lightheadedness  She plans to return back to work as a  over the next few weeks    Historical Information   Past Medical History:   Diagnosis Date    Asthma     Atrial fibrillation     Blurred vision     Cardiac disease     Colitis     COPD (chronic obstructive pulmonary disease)     Diverticulosis     DJD (degenerative joint disease)     Emphysema lung     Gait abnormality      Past Surgical History:   Procedure Laterality Date    BLADDER SURGERY      COLON SURGERY      COLONOSCOPY W/ POLYPECTOMY N/A 1/21/2019    Procedure: COLONOSCOPY W/ POLYPECTOMY;  Surgeon: Fabiola Castro MD;  Location: [facility] GI [facility];   Service: General    SMALL INTESTINE SURGERY       Family History   Problem Relation Age of Onset    Heart disease Mother      Current Outpatient Medications on File Prior to Visit   Medication Sig Dispense Refill    albuterol (PROVENTIL HFA,VENTOLIN HFA) 90 mcg/act inhaler Inhale 2 puffs every 6 (six) hours as needed for wheezing or shortness of breath 1 Inhaler 1    ALPRAZolam (XANAX) 0 5 mg tablet Take 0 5 mg by mouth daily at bedtime as needed      apixaban (ELIQUIS) 5 mg Take 1 tablet (5 mg total) by mouth 2 (two) times a day 60 tablet 1    Calcium Carb-Cholecalciferol (CALCIUM 1000 + D PO) Take 1,000 mg by mouth daily      cholestyramine sugar free (QUESTRAN LIGHT) 4 g packet Take 1 packet (4 g total) by mouth 2 (two) times a day 60 tablet 1    diltiazem (CARDIZEM CD) 240 mg 24 hr capsule Take 1 capsule (240 mg total) by mouth daily 30 capsule 1    fluticasone-umeclidinium-vilanterol (TRELEGY ELLIPTA) 100-62 5-25 MCG/INH inhaler 1 puff daily      ipratropium-albuterol (DUO-NEB) 0 5-2 5 mg/3 mL nebulizer solution Take 1 vial (3 mL total) by nebulization every 6 (six) hours while awake 300 mL 0    iron polysaccharides (FERREX) 150 mg capsule Take 1 capsule (150 mg total) by mouth 2 (two) times a day 60 capsule 1    loperamide (IMODIUM) 2 mg capsule Take 1 capsule (2 mg total) by mouth 3 (three) times a day as needed for diarrhea 30 capsule 1    metoprolol tartrate (LOPRESSOR) 25 mg tablet Take 0 5 tablets (12 5 mg total) by mouth every 12 (twelve) hours 30 tablet 1    montelukast (SINGULAIR) 10 mg tablet Take 1 tablet (10 mg total) by mouth daily at bedtime 30 tablet 1    pantoprazole (PROTONIX) 40 mg tablet Take 1 tablet (40 mg total) by mouth daily in the early morning 30 tablet 1    potassium chloride (MICRO-K) 10 MEQ CR capsule Take 1 capsule (10 mEq total) by mouth daily 30 capsule 1    psyllium (METAMUCIL) packet Take 1 packet by mouth 3 (three) times a day 100 packet 1    alendronate (FOSAMAX) 70 mg tablet take 1 tablet by mouth every 7 days IN THE MORNING 30 MINUTES bef     (REFER TO PRESCRIPTION NOTES)  0     No current facility-administered medications on file prior to visit  Allergies   Allergen Reactions    Fluticasone      Social History     Socioeconomic History    Marital status:       Spouse name: None    Number of children: None    Years of education: None    Highest education level: None   Occupational History    None   Social Needs    Financial resource strain: None    Food insecurity:     Worry: None     Inability: None    Transportation needs:     Medical: None     Non-medical: None   Tobacco Use    Smoking status: Former Smoker     Last attempt to quit: 2013     Years since quittin 0    Smokeless tobacco: Never Used   Substance and Sexual Activity    Alcohol use: Not Currently     Frequency: Never     Binge frequency: Never    Drug use: No    Sexual activity: Not Currently   Lifestyle    Physical activity:     Days per week: None     Minutes per session: None    Stress: None   Relationships    Social connections:     Talks on phone: None     Gets together: None Attends Cheondoism service: None     Active member of club or organization: None     Attends meetings of clubs or organizations: None     Relationship status: None    Intimate partner violence:     Fear of current or ex partner: None     Emotionally abused: None     Physically abused: None     Forced sexual activity: None   Other Topics Concern    None   Social History Narrative    None        Review of Systems  All other systems negative, except as noted in HPI    Vitals:  Vitals:    12/09/19 1245   BP: 120/56   Pulse: 64   Weight: 67 1 kg (148 lb)   Height: 5' (1 524 m)     BMI - Body mass index is 28 9 kg/m²  Wt Readings from Last 7 Encounters:   12/09/19 67 1 kg (148 lb)   11/05/19 70 5 kg (155 lb 6 8 oz)   11/04/19 69 4 kg (153 lb)   10/15/19 (!) 166 kg (365 lb 8 4 oz)   10/09/19 77 3 kg (170 lb 6 7 oz)   10/08/19 74 7 kg (164 lb 10 9 oz)   10/04/19 76 1 kg (167 lb 12 3 oz)       Physical Exam    Vitals reviewed  Nursing note & chart reviewed  Constitutional:  Juanito Phillips appears well, pleasant and cooperative  Alert and oriented x 3  No acute distress   HEENT:    Normocephalic, atraumatic   Neck:  Supple, no JVD, negative AJR   Lungs:  Respirations unlabored, clear to auscultation bilaterally, no rales, no wheezing  Chest Wall:  No tenderness, no pertinent deformity   Heart[de-identified]  Regular rate, Regular rhythm, S1 and S2 normal, no murmurs, no rubs, no gallops   Abdomen:  Soft, non-tender, non-distended   Neurological:  Awake, alert, oriented x3   Non-focal exam    Extremities:  No pedal edema, no calf tenderness       Labs:  CBC:   Lab Results   Component Value Date    WBC 6 80 11/12/2019    RBC 4 30 11/12/2019    HGB 11 4 (L) 11/12/2019    HCT 35 4 (L) 11/12/2019    MCV 83 11/12/2019     11/12/2019    RDW 16 8 (H) 11/12/2019       CMP:   Lab Results   Component Value Date     01/08/2016    K 4 3 11/12/2019     11/12/2019    CO2 22 11/12/2019    ANIONGAP 13 01/08/2016    BUN 6 (L) 2019    CREATININE 0 50 (L) 2019    EGFR 90 2019    GLUCOSE 77 2016    CALCIUM 9 2 2019    AST 10 (L) 2019    ALT 15 2019    ALKPHOS 76 2019    PROT 7 1 2016    BILITOT 0 23 2016       Magnesium:  Lab Results   Component Value Date    MG 2 0 2019       Lipid Profile:   Lab Results   Component Value Date    CHOL 229 2016    HDL 42 2016    TRIG 79 2016    LDLCALC 171 (H) 2016       Thyroid Studies:   Lab Results   Component Value Date    BWK2GMCHCZCH 2 620 10/02/2019       No components found for: Spark LynnS    Lab Results   Component Value Date    INR 1 34 10/11/2019    INR 1 05 2018    INR 1 11 2018   5    Imaging: No results found  Cardiac testing:   Results for orders placed during the hospital encounter of 10/06/19   Echo complete with contrast if indicated    [place] [address]  [address]    Transthoracic Echocardiogram  2D, M-mode, Doppler, and Color Doppler    Study date:  07-Oct-2019    Patient: Davian Fletcher  MR number: [id number]  Account number: [de-identified]  : 1935  Age: 82 years  Gender: Female  Status: Inpatient  [facility]   Height: 60 in  Weight: 164 lb  BP: 88/ 53 mmHg    Indications: Afib    Diagnoses: I48 0 - Atrial fibrillation    Sonographer:  Jossue Rivers RDCS  Referring Physician:  Tayla Hagan MD  Group:  [facility]  Interpreting Physician:  Keli Santos MD    SUMMARY    LEFT VENTRICLE:  Systolic function was normal  Ejection fraction was estimated to be 60 %  There were no regional wall motion abnormalities  There was mild concentric hypertrophy  Doppler parameters were consistent with abnormal left ventricular relaxation (grade 1 diastolic dysfunction)  MITRAL VALVE:  There was mild annular calcification  There was trace regurgitation      AORTIC VALVE:  There was mild regurgitation  TRICUSPID VALVE:  There was trace regurgitation  HISTORY: PRIOR HISTORY: Congestive heart failure  Atrial fibrillation  Chronic lung disease  PROCEDURE: The study was performed in the [facility]  This was a routine study  The transthoracic approach was used  The study included complete 2D imaging, M-mode, complete spectral Doppler, and color Doppler  The  heart rate was 92 bpm, at the start of the study  Images were obtained from the parasternal, apical, subcostal, and suprasternal notch acoustic windows  Echocardiographic views were limited due to poor acoustic window availability,  decreased penetration, and lung interference  This was a technically difficult study  LEFT VENTRICLE: Size was normal  Systolic function was normal  Ejection fraction was estimated to be 60 %  There were no regional wall motion abnormalities  Wall thickness was mildly increased  There was mild concentric hypertrophy  DOPPLER: Doppler parameters were consistent with abnormal left ventricular relaxation (grade 1 diastolic dysfunction)  RIGHT VENTRICLE: The size was normal  Systolic function was normal  Wall thickness was normal     LEFT ATRIUM: Size was normal     RIGHT ATRIUM: Size was normal     MITRAL VALVE: There was mild annular calcification  Valve structure was normal  There was normal leaflet separation  DOPPLER: The transmitral velocity was within the normal range  There was no evidence for stenosis  There was trace  regurgitation  AORTIC VALVE: The valve was not well visualized  DOPPLER: Transaortic velocity was within the normal range  There was no evidence for stenosis  There was mild regurgitation  TRICUSPID VALVE: The valve structure was normal  There was normal leaflet separation  DOPPLER: The transtricuspid velocity was within the normal range  There was no evidence for stenosis  There was trace regurgitation   Pulmonary artery  systolic pressure was moderately increased  Estimated peak PA pressure was 65 mmHg  The findings suggest moderate pulmonary hypertension  PULMONIC VALVE: Not well visualized  DOPPLER: The transpulmonic velocity was within the normal range  There was no significant regurgitation  PERICARDIUM: There was no pericardial effusion  The pericardium was normal in appearance  AORTA: The root exhibited normal size  SYSTEMIC VEINS: IVC: The inferior vena cava was upper normal     SYSTEM MEASUREMENT TABLES    2D  %FS: 30 38 %  AV Diam: 3 11 cm  EDV(Teich): 61 91 ml  EF(Teich): 58 53 %  ESV(Teich): 25 68 ml  IVSd: 1 15 cm  LA Area: 16 7 cm2  LA Diam: 3 58 cm  LVEDV MOD A4C: 81 9 ml  LVEF MOD A4C: 56 03 %  LVESV MOD A4C: 36 01 ml  LVIDd: 3 8 cm  LVIDs: 2 65 cm  LVLd A4C: 6 83 cm  LVLs A4C: 5 86 cm  LVPWd: 1 12 cm  RA Area: 14 56 cm2  RV Diam : 3 27 cm  SV MOD A4C: 45 89 ml  SV(Cube): 36 32 ml  SV(Teich): 36 24 ml    CW  AR Dec [facility]: 1 94 m/s2  AR Dec Time: 1840 69 ms  AR PHT: 533 8 ms  AR Vmax: 3 58 m/s  AR maxP 22 mmHg  TR Vmax: 3 49 m/s  TR maxP 16 mmHg    MM  TAPSE: 2 16 cm    PW  E': 0 07 m/s  E/E': 13 57  MV A Rodrigue: 1 44 m/s  MV Dec [facility]: 2 9 m/s2  MV DecT: 337 71 ms  MV E Rodrigue: 0 98 m/s  MV E/A Ratio: 0 68    [employer] Accredited Echocardiography [facility]    Prepared and electronically signed by    Jossue Quinonez MD  Signed 07-Oct-2019 21:51:59       No results found for this or any previous visit  No results found for this or any previous visit  No results found for this or any previous visit

## 2020-01-08 ENCOUNTER — OFFICE VISIT (OUTPATIENT)
Dept: URGENT CARE | Facility: HOSPITAL | Age: 85
End: 2020-01-08
Payer: MEDICARE

## 2020-01-08 ENCOUNTER — APPOINTMENT (OUTPATIENT)
Dept: RADIOLOGY | Facility: HOSPITAL | Age: 85
End: 2020-01-08
Payer: MEDICARE

## 2020-01-08 VITALS
BODY MASS INDEX: 28.98 KG/M2 | WEIGHT: 147.6 LBS | DIASTOLIC BLOOD PRESSURE: 84 MMHG | OXYGEN SATURATION: 94 % | HEIGHT: 60 IN | HEART RATE: 91 BPM | TEMPERATURE: 97.3 F | SYSTOLIC BLOOD PRESSURE: 140 MMHG | RESPIRATION RATE: 18 BRPM

## 2020-01-08 DIAGNOSIS — S81.802A OPEN WOUND OF LEFT LOWER LEG, INITIAL ENCOUNTER: ICD-10-CM

## 2020-01-08 DIAGNOSIS — S81.802A OPEN WOUND OF LEFT LOWER LEG, INITIAL ENCOUNTER: Primary | ICD-10-CM

## 2020-01-08 PROCEDURE — 99213 OFFICE O/P EST LOW 20 MIN: CPT | Performed by: NURSE PRACTITIONER

## 2020-01-08 PROCEDURE — 90471 IMMUNIZATION ADMIN: CPT | Performed by: NURSE PRACTITIONER

## 2020-01-08 PROCEDURE — G0463 HOSPITAL OUTPT CLINIC VISIT: HCPCS | Performed by: NURSE PRACTITIONER

## 2020-01-08 PROCEDURE — 73590 X-RAY EXAM OF LOWER LEG: CPT

## 2020-01-08 PROCEDURE — 90715 TDAP VACCINE 7 YRS/> IM: CPT

## 2020-01-08 RX ORDER — CEPHALEXIN 500 MG/1
500 CAPSULE ORAL EVERY 12 HOURS SCHEDULED
Qty: 14 CAPSULE | Refills: 0 | Status: SHIPPED | OUTPATIENT
Start: 2020-01-08 | End: 2020-01-15

## 2020-01-08 NOTE — PROGRESS NOTES
[address] [employer] [facility]        NAME: Magnolia Conklin is a 82 y o  female  : 1935    MRN: [id number]  DATE: 2020  TIME: 10: 00 AM     Assessment and Plan   Open wound of left lower leg, initial encounter [S81 232A]  1  Open wound of left lower leg, initial encounter  XR tibia fibula 2 vw left    TDAP Vaccine greater than or equal to 6yo    cephalexin (KEFLEX) 500 mg capsule         Patient Instructions     Patient Instructions   As we discussed if you did this while working you need to report this to you employer  No acute abnormality on x-ray  Steri-Strips applied to site  Keep site wrapped with a Lisa gauze  You can apply triple antibiotic ointment to site  Follow up PCP or wound management for recheck and 3 days  Tdap updated by nurse today  Monitor for signs of infection including redness, drainage, fevers, body aches or chills  If this occurs go directly to the ER  Will start on Keflex 3 times a day  Chief Complaint     Chief Complaint   Patient presents with    Laceration     Left Leg happened 2 days keeps draining cut on school bus step         History of Present Illness   Magnolia Conklin presents to the clinic c/o    This is a 28-year-old female here today with complaints left lower leg laceration  She states she was walking on a school bus and cut her leg on the step  She denies any pain over site  She has full range of motion of her leg  Tetanus was last given in   No decreased strength of the leg  No fevers body aches or chills at this time  She is not diabetic but she is on Eliquis  She has been clean the wound at home  She has a friend here with her today he has been helping her  She was concerned as the wound is continuously draining  Review of Systems   Review of Systems   Cardiovascular: Negative  Skin: Positive for wound  Psychiatric/Behavioral: Negative            Current Medications     Long-Term Medications   Medication Sig Dispense Refill    ALPRAZolam (XANAX) 0 5 mg tablet Take 0 5 mg by mouth daily at bedtime as needed      apixaban (ELIQUIS) 5 mg Take 1 tablet (5 mg total) by mouth 2 (two) times a day 60 tablet 0    cholestyramine sugar free (QUESTRAN LIGHT) 4 g packet Take 1 packet (4 g total) by mouth 2 (two) times a day 60 tablet 1    diltiazem (CARDIZEM CD) 240 mg 24 hr capsule Take 1 capsule (240 mg total) by mouth daily 90 capsule 0    ipratropium-albuterol (DUO-NEB) 0 5-2 5 mg/3 mL nebulizer solution Take 1 vial (3 mL total) by nebulization every 6 (six) hours while awake 300 mL 0    iron polysaccharides (FERREX) 150 mg capsule Take 1 capsule (150 mg total) by mouth 2 (two) times a day 60 capsule 1    metoprolol tartrate (LOPRESSOR) 25 mg tablet Take 0 5 tablets (12 5 mg total) by mouth every 12 (twelve) hours 90 tablet 0    montelukast (SINGULAIR) 10 mg tablet Take 1 tablet (10 mg total) by mouth daily at bedtime 30 tablet 1    pantoprazole (PROTONIX) 40 mg tablet Take 1 tablet (40 mg total) by mouth daily in the early morning 30 tablet 1    potassium chloride (MICRO-K) 10 MEQ CR capsule Take 1 capsule (10 mEq total) by mouth daily 30 capsule 1    psyllium (METAMUCIL) packet Take 1 packet by mouth 3 (three) times a day 100 packet 1       Current Allergies     Allergies as of 01/08/2020 - Reviewed 01/08/2020   Allergen Reaction Noted    Fluticasone              The following portions of the patient's history were reviewed and updated as appropriate: allergies, current medications, past family history, past medical history, past social history, past surgical history and problem list     Objective   /84 (BP Location: Left arm, Patient Position: Sitting, Cuff Size: Adult)   Pulse 91   Temp (!) 97 3 °F (36 3 °C) (Tympanic)   Resp 18   Ht 5' (1 524 m)   Wt 67 kg (147 lb 9 6 oz)   LMP  (LMP Unknown)   SpO2 94%   BMI 28 83 kg/m²        Physical Exam     Physical Exam   Constitutional: She is oriented to person, place, and time  She appears well-developed and well-nourished  Cardiovascular: Normal rate and regular rhythm  Neurological: She is alert and oriented to person, place, and time  Skin:        Psychiatric: She has a normal mood and affect  Her behavior is normal    Nursing note and vitals reviewed        No acute abnormality on xray

## 2020-01-08 NOTE — PATIENT INSTRUCTIONS
As we discussed if you did this while working you need to report this to you employer  No acute abnormality on x-ray  Steri-Strips applied to site  Keep site wrapped with a Lisa gauze  You can apply triple antibiotic ointment to site  Follow up PCP or wound management for recheck and 3 days  Tdap updated by nurse today  Monitor for signs of infection including redness, drainage, fevers, body aches or chills  If this occurs go directly to the ER  Will start on Keflex 3 times a day

## 2020-01-21 ENCOUNTER — APPOINTMENT (EMERGENCY)
Dept: CT IMAGING | Facility: HOSPITAL | Age: 85
DRG: 380 | End: 2020-01-21
Payer: MEDICARE

## 2020-01-21 ENCOUNTER — ANESTHESIA EVENT (INPATIENT)
Dept: GASTROENTEROLOGY | Facility: HOSPITAL | Age: 85
DRG: 380 | End: 2020-01-21
Payer: MEDICARE

## 2020-01-21 ENCOUNTER — HOSPITAL ENCOUNTER (INPATIENT)
Facility: HOSPITAL | Age: 85
LOS: 1 days | DRG: 380 | End: 2020-01-22
Attending: EMERGENCY MEDICINE | Admitting: FAMILY MEDICINE
Payer: MEDICARE

## 2020-01-21 DIAGNOSIS — R10.9 ABDOMINAL PAIN: Primary | ICD-10-CM

## 2020-01-21 DIAGNOSIS — R11.2 INTRACTABLE VOMITING WITH NAUSEA, UNSPECIFIED VOMITING TYPE: ICD-10-CM

## 2020-01-21 DIAGNOSIS — R07.9 CHEST PAIN: ICD-10-CM

## 2020-01-21 DIAGNOSIS — R14.0 ABDOMINAL DISTENTION: ICD-10-CM

## 2020-01-21 LAB
ALBUMIN SERPL BCP-MCNC: 4.1 G/DL (ref 3.5–5.7)
ALP SERPL-CCNC: 81 U/L (ref 55–165)
ALT SERPL W P-5'-P-CCNC: 26 U/L (ref 7–52)
ANION GAP SERPL CALCULATED.3IONS-SCNC: 6 MMOL/L (ref 4–13)
AST SERPL W P-5'-P-CCNC: 16 U/L (ref 13–39)
BASOPHILS # BLD AUTO: 0.1 THOUSANDS/ΜL (ref 0–0.1)
BASOPHILS NFR BLD AUTO: 1 % (ref 0–2)
BILIRUB SERPL-MCNC: 0.2 MG/DL (ref 0.2–1)
BILIRUB UR QL STRIP: NEGATIVE
BNP SERPL-MCNC: 48 PG/ML (ref 1–100)
BUN SERPL-MCNC: 12 MG/DL (ref 7–25)
CALCIUM SERPL-MCNC: 9.3 MG/DL (ref 8.6–10.5)
CHLORIDE SERPL-SCNC: 99 MMOL/L (ref 98–107)
CK SERPL-CCNC: 49 U/L (ref 30–192)
CLARITY UR: CLEAR
CO2 SERPL-SCNC: 28 MMOL/L (ref 21–31)
COLOR UR: YELLOW
CREAT SERPL-MCNC: 0.5 MG/DL (ref 0.6–1.2)
EOSINOPHIL # BLD AUTO: 0.1 THOUSAND/ΜL (ref 0–0.61)
EOSINOPHIL NFR BLD AUTO: 1 % (ref 0–5)
ERYTHROCYTE [DISTWIDTH] IN BLOOD BY AUTOMATED COUNT: 17.1 % (ref 11.5–14.5)
GFR SERPL CREATININE-BSD FRML MDRD: 90 ML/MIN/1.73SQ M
GLUCOSE SERPL-MCNC: 147 MG/DL (ref 65–99)
GLUCOSE UR STRIP-MCNC: NEGATIVE MG/DL
HCT VFR BLD AUTO: 40.8 % (ref 42–47)
HGB BLD-MCNC: 13 G/DL (ref 12–16)
HGB UR QL STRIP.AUTO: NEGATIVE
KETONES UR STRIP-MCNC: ABNORMAL MG/DL
LEUKOCYTE ESTERASE UR QL STRIP: NEGATIVE
LIPASE SERPL-CCNC: <10 U/L (ref 11–82)
LYMPHOCYTES # BLD AUTO: 0.8 THOUSANDS/ΜL (ref 0.6–4.47)
LYMPHOCYTES NFR BLD AUTO: 10 % (ref 21–51)
MAGNESIUM SERPL-MCNC: 2.5 MG/DL (ref 1.9–2.7)
MCH RBC QN AUTO: 26.7 PG (ref 26–34)
MCHC RBC AUTO-ENTMCNC: 31.8 G/DL (ref 31–37)
MCV RBC AUTO: 84 FL (ref 81–99)
MONOCYTES # BLD AUTO: 0.8 THOUSAND/ΜL (ref 0.17–1.22)
MONOCYTES NFR BLD AUTO: 9 % (ref 2–12)
NEUTROPHILS # BLD AUTO: 6.5 THOUSANDS/ΜL (ref 1.4–6.5)
NEUTS SEG NFR BLD AUTO: 79 % (ref 42–75)
NITRITE UR QL STRIP: NEGATIVE
PH UR STRIP.AUTO: 8 [PH]
PLATELET # BLD AUTO: 232 THOUSANDS/UL (ref 149–390)
PMV BLD AUTO: 7.4 FL (ref 8.6–11.7)
POTASSIUM SERPL-SCNC: 3.4 MMOL/L (ref 3.5–5.5)
PROT SERPL-MCNC: 7.2 G/DL (ref 6.4–8.9)
PROT UR STRIP-MCNC: NEGATIVE MG/DL
RBC # BLD AUTO: 4.86 MILLION/UL (ref 3.9–5.2)
SODIUM SERPL-SCNC: 133 MMOL/L (ref 134–143)
SP GR UR STRIP.AUTO: 1.01 (ref 1–1.03)
TROPONIN I SERPL-MCNC: <0.03 NG/ML
UROBILINOGEN UR QL STRIP.AUTO: 0.2 E.U./DL
WBC # BLD AUTO: 8.2 THOUSAND/UL (ref 4.8–10.8)

## 2020-01-21 PROCEDURE — 84484 ASSAY OF TROPONIN QUANT: CPT | Performed by: EMERGENCY MEDICINE

## 2020-01-21 PROCEDURE — 93005 ELECTROCARDIOGRAM TRACING: CPT

## 2020-01-21 PROCEDURE — 99285 EMERGENCY DEPT VISIT HI MDM: CPT | Performed by: EMERGENCY MEDICINE

## 2020-01-21 PROCEDURE — 74177 CT ABD & PELVIS W/CONTRAST: CPT

## 2020-01-21 PROCEDURE — 96374 THER/PROPH/DIAG INJ IV PUSH: CPT

## 2020-01-21 PROCEDURE — 96361 HYDRATE IV INFUSION ADD-ON: CPT

## 2020-01-21 PROCEDURE — C9113 INJ PANTOPRAZOLE SODIUM, VIA: HCPCS | Performed by: INTERNAL MEDICINE

## 2020-01-21 PROCEDURE — 83735 ASSAY OF MAGNESIUM: CPT | Performed by: EMERGENCY MEDICINE

## 2020-01-21 PROCEDURE — 82550 ASSAY OF CK (CPK): CPT | Performed by: EMERGENCY MEDICINE

## 2020-01-21 PROCEDURE — 83880 ASSAY OF NATRIURETIC PEPTIDE: CPT | Performed by: EMERGENCY MEDICINE

## 2020-01-21 PROCEDURE — 99285 EMERGENCY DEPT VISIT HI MDM: CPT

## 2020-01-21 PROCEDURE — 94640 AIRWAY INHALATION TREATMENT: CPT

## 2020-01-21 PROCEDURE — 80053 COMPREHEN METABOLIC PANEL: CPT | Performed by: EMERGENCY MEDICINE

## 2020-01-21 PROCEDURE — 94760 N-INVAS EAR/PLS OXIMETRY 1: CPT

## 2020-01-21 PROCEDURE — 71275 CT ANGIOGRAPHY CHEST: CPT

## 2020-01-21 PROCEDURE — 81003 URINALYSIS AUTO W/O SCOPE: CPT | Performed by: EMERGENCY MEDICINE

## 2020-01-21 PROCEDURE — 85025 COMPLETE CBC W/AUTO DIFF WBC: CPT | Performed by: EMERGENCY MEDICINE

## 2020-01-21 PROCEDURE — 96375 TX/PRO/DX INJ NEW DRUG ADDON: CPT

## 2020-01-21 PROCEDURE — 36415 COLL VENOUS BLD VENIPUNCTURE: CPT | Performed by: EMERGENCY MEDICINE

## 2020-01-21 PROCEDURE — 83690 ASSAY OF LIPASE: CPT | Performed by: EMERGENCY MEDICINE

## 2020-01-21 PROCEDURE — 1124F ACP DISCUSS-NO DSCNMKR DOCD: CPT | Performed by: SPECIALIST

## 2020-01-21 RX ORDER — PANTOPRAZOLE SODIUM 40 MG/1
40 INJECTION, POWDER, FOR SOLUTION INTRAVENOUS EVERY 12 HOURS SCHEDULED
Status: DISCONTINUED | OUTPATIENT
Start: 2020-01-21 | End: 2020-01-22 | Stop reason: HOSPADM

## 2020-01-21 RX ORDER — XYLITOL/YERBA SANTA
5 AEROSOL, SPRAY WITH PUMP (ML) MUCOUS MEMBRANE 4 TIMES DAILY PRN
Status: DISCONTINUED | OUTPATIENT
Start: 2020-01-21 | End: 2020-01-22 | Stop reason: HOSPADM

## 2020-01-21 RX ORDER — ONDANSETRON 2 MG/ML
4 INJECTION INTRAMUSCULAR; INTRAVENOUS ONCE
Status: COMPLETED | OUTPATIENT
Start: 2020-01-21 | End: 2020-01-21

## 2020-01-21 RX ORDER — SODIUM CHLORIDE AND POTASSIUM CHLORIDE .9; .15 G/100ML; G/100ML
75 SOLUTION INTRAVENOUS CONTINUOUS
Status: DISCONTINUED | OUTPATIENT
Start: 2020-01-21 | End: 2020-01-22 | Stop reason: HOSPADM

## 2020-01-21 RX ORDER — IPRATROPIUM BROMIDE AND ALBUTEROL SULFATE 2.5; .5 MG/3ML; MG/3ML
3 SOLUTION RESPIRATORY (INHALATION)
Status: DISCONTINUED | OUTPATIENT
Start: 2020-01-21 | End: 2020-01-21

## 2020-01-21 RX ORDER — BACITRACIN, NEOMYCIN, POLYMYXIN B 400; 3.5; 5 [USP'U]/G; MG/G; [USP'U]/G
1 OINTMENT TOPICAL DAILY
Status: DISCONTINUED | OUTPATIENT
Start: 2020-01-21 | End: 2020-01-22 | Stop reason: HOSPADM

## 2020-01-21 RX ORDER — SODIUM CHLORIDE FOR INHALATION 0.9 %
3 VIAL, NEBULIZER (ML) INHALATION
Status: DISCONTINUED | OUTPATIENT
Start: 2020-01-21 | End: 2020-01-22 | Stop reason: HOSPADM

## 2020-01-21 RX ORDER — FLUTICASONE FUROATE AND VILANTEROL 100; 25 UG/1; UG/1
1 POWDER RESPIRATORY (INHALATION) DAILY
Status: DISCONTINUED | OUTPATIENT
Start: 2020-01-21 | End: 2020-01-22 | Stop reason: HOSPADM

## 2020-01-21 RX ORDER — ACETAMINOPHEN 325 MG/1
650 TABLET ORAL EVERY 4 HOURS PRN
Status: DISCONTINUED | OUTPATIENT
Start: 2020-01-21 | End: 2020-01-22 | Stop reason: HOSPADM

## 2020-01-21 RX ORDER — LEVALBUTEROL 1.25 MG/.5ML
1.25 SOLUTION, CONCENTRATE RESPIRATORY (INHALATION)
Status: DISCONTINUED | OUTPATIENT
Start: 2020-01-21 | End: 2020-01-22 | Stop reason: HOSPADM

## 2020-01-21 RX ORDER — ACETAMINOPHEN 325 MG/1
650 TABLET ORAL EVERY 6 HOURS PRN
Status: DISCONTINUED | OUTPATIENT
Start: 2020-01-21 | End: 2020-01-21

## 2020-01-21 RX ORDER — ONDANSETRON 2 MG/ML
4 INJECTION INTRAMUSCULAR; INTRAVENOUS EVERY 6 HOURS PRN
Status: DISCONTINUED | OUTPATIENT
Start: 2020-01-21 | End: 2020-01-22 | Stop reason: HOSPADM

## 2020-01-21 RX ORDER — METOCLOPRAMIDE HYDROCHLORIDE 5 MG/ML
10 INJECTION INTRAMUSCULAR; INTRAVENOUS ONCE
Status: COMPLETED | OUTPATIENT
Start: 2020-01-21 | End: 2020-01-21

## 2020-01-21 RX ORDER — ASPIRIN 81 MG/1
324 TABLET, CHEWABLE ORAL ONCE
Status: COMPLETED | OUTPATIENT
Start: 2020-01-21 | End: 2020-01-21

## 2020-01-21 RX ORDER — ALBUTEROL SULFATE 90 UG/1
2 AEROSOL, METERED RESPIRATORY (INHALATION) EVERY 6 HOURS PRN
Status: DISCONTINUED | OUTPATIENT
Start: 2020-01-21 | End: 2020-01-22 | Stop reason: HOSPADM

## 2020-01-21 RX ADMIN — ISODIUM CHLORIDE 3 ML: 0.03 SOLUTION RESPIRATORY (INHALATION) at 20:26

## 2020-01-21 RX ADMIN — POTASSIUM CHLORIDE AND SODIUM CHLORIDE 75 ML/HR: 900; 150 INJECTION, SOLUTION INTRAVENOUS at 10:41

## 2020-01-21 RX ADMIN — BACITRACIN, NEOMYCIN, POLYMYXIN B 1 SMALL APPLICATION: 400; 3.5; 5 OINTMENT TOPICAL at 10:41

## 2020-01-21 RX ADMIN — IOHEXOL 100 ML: 350 INJECTION, SOLUTION INTRAVENOUS at 02:51

## 2020-01-21 RX ADMIN — IPRATROPIUM BROMIDE AND ALBUTEROL SULFATE 3 ML: 2.5; .5 SOLUTION RESPIRATORY (INHALATION) at 11:39

## 2020-01-21 RX ADMIN — ASPIRIN 81 MG 324 MG: 81 TABLET ORAL at 04:18

## 2020-01-21 RX ADMIN — METOCLOPRAMIDE 10 MG: 5 INJECTION, SOLUTION INTRAMUSCULAR; INTRAVENOUS at 06:08

## 2020-01-21 RX ADMIN — LEVALBUTEROL HYDROCHLORIDE 1.25 MG: 1.25 SOLUTION, CONCENTRATE RESPIRATORY (INHALATION) at 14:20

## 2020-01-21 RX ADMIN — DEXTRAN 70 AND HYPROMELLOSE 2910 1 DROP: 1; 3 SOLUTION/ DROPS OPHTHALMIC at 21:50

## 2020-01-21 RX ADMIN — MORPHINE SULFATE 2 MG: 2 INJECTION, SOLUTION INTRAMUSCULAR; INTRAVENOUS at 02:00

## 2020-01-21 RX ADMIN — FLUTICASONE FUROATE AND VILANTEROL TRIFENATATE 1 PUFF: 100; 25 POWDER RESPIRATORY (INHALATION) at 11:39

## 2020-01-21 RX ADMIN — ISODIUM CHLORIDE 3 ML: 0.03 SOLUTION RESPIRATORY (INHALATION) at 14:20

## 2020-01-21 RX ADMIN — ONDANSETRON 4 MG: 2 INJECTION INTRAMUSCULAR; INTRAVENOUS at 01:59

## 2020-01-21 RX ADMIN — LEVALBUTEROL HYDROCHLORIDE 1.25 MG: 1.25 SOLUTION, CONCENTRATE RESPIRATORY (INHALATION) at 20:26

## 2020-01-21 RX ADMIN — Medication 5 SPRAY: at 11:39

## 2020-01-21 RX ADMIN — SODIUM CHLORIDE 1000 ML: 0.9 INJECTION, SOLUTION INTRAVENOUS at 01:59

## 2020-01-21 RX ADMIN — PANTOPRAZOLE SODIUM 40 MG: 40 INJECTION, POWDER, FOR SOLUTION INTRAVENOUS at 14:36

## 2020-01-21 RX ADMIN — MORPHINE SULFATE 2 MG: 2 INJECTION, SOLUTION INTRAMUSCULAR; INTRAVENOUS at 10:46

## 2020-01-21 RX ADMIN — ONDANSETRON 4 MG: 2 INJECTION INTRAMUSCULAR; INTRAVENOUS at 11:14

## 2020-01-21 RX ADMIN — PANTOPRAZOLE SODIUM 40 MG: 40 INJECTION, POWDER, FOR SOLUTION INTRAVENOUS at 21:50

## 2020-01-21 NOTE — H&P
Shwetha,#  CVR:[id number] F  FXE:[id number]    WJY:[id number]  Adm Date: 1/21/2020 0134  1:34 AM   ATT PHY: [place]  [address]       Chief Complaint: vomiting    History of Presenting Illness: Adonay Chatman is a(n) 82y o  year old female  presenting to [facility] for vomiting, upper abdominal pains, and abdominal distention for the last 1 5 days  Patient reports noticing an acute but gradual increase in the size of her stomach on Monday with resulting nausea, vomiting, 10/10 abdominal pain, and inability to keep solids or liquids down  She denies fevers or chills and reports having normal BM's, with the last one being this morning  She recently received a colonoscopy by GI in November 2019 for intractable diarrhea, but this was largely unremarkable  She did receive any upper GI studies  In the ED, patient's labs were largely unremarkable with the exception of a sodium level of 133 and potassium of 3 4  CTA of the chest and abdomen was obtained, which demonstrated stomach distention with fluid and food debris along with esophageal distention with fluid, concerning overall for gastric outlet obstruction  Given this finding, the patient was admitted to [facility] for further evaluation and management  The patient was seen after reviewing the chart and discussing the case with caring staff  Today during our encounter, the patient reports some improvement in abdominal pain (down to 7/10) after receiving morphine in the ED, but above-mention symptomatology is otherwise unchanged  Allergies   Allergen Reactions    Fluticasone        No current facility-administered medications on file prior to encounter        Current Outpatient Medications on File Prior to Encounter   Medication Sig Dispense Refill    albuterol (PROVENTIL HFA,VENTOLIN HFA) 90 mcg/act inhaler Inhale 2 puffs every 6 (six) hours as needed for wheezing or shortness of breath 1 Inhaler 1    alendronate (FOSAMAX) 70 mg tablet Take by mouth every 7 days   0    ALPRAZolam (XANAX) 0 5 mg tablet Take 0 5 mg by mouth daily at bedtime as needed      apixaban (ELIQUIS) 5 mg Take 1 tablet (5 mg total) by mouth 2 (two) times a day 60 tablet 0    Calcium Carb-Cholecalciferol (CALCIUM 1000 + D PO) Take 1,000 mg by mouth daily      cholestyramine sugar free (QUESTRAN LIGHT) 4 g packet Take 1 packet (4 g total) by mouth 2 (two) times a day 60 tablet 1    diltiazem (CARDIZEM CD) 240 mg 24 hr capsule Take 1 capsule (240 mg total) by mouth daily 90 capsule 0    fluticasone-umeclidinium-vilanterol (TRELEGY ELLIPTA) 100-62 5-25 MCG/INH inhaler 1 puff daily      ipratropium-albuterol (DUO-NEB) 0 5-2 5 mg/3 mL nebulizer solution Take 1 vial (3 mL total) by nebulization every 6 (six) hours while awake 300 mL 0    iron polysaccharides (FERREX) 150 mg capsule Take 1 capsule (150 mg total) by mouth 2 (two) times a day 60 capsule 1    loperamide (IMODIUM) 2 mg capsule Take 1 capsule (2 mg total) by mouth 3 (three) times a day as needed for diarrhea 30 capsule 1    metoprolol tartrate (LOPRESSOR) 25 mg tablet Take 0 5 tablets (12 5 mg total) by mouth every 12 (twelve) hours 90 tablet 0    montelukast (SINGULAIR) 10 mg tablet Take 1 tablet (10 mg total) by mouth daily at bedtime 30 tablet 1    pantoprazole (PROTONIX) 40 mg tablet Take 1 tablet (40 mg total) by mouth daily in the early morning 30 tablet 1    potassium chloride (MICRO-K) 10 MEQ CR capsule Take 1 capsule (10 mEq total) by mouth daily 30 capsule 1    psyllium (METAMUCIL) packet Take 1 packet by mouth 3 (three) times a day 100 packet 1       Active Ambulatory Problems     Diagnosis Date Noted    COPD (chronic obstructive pulmonary disease) ([facility] [address] ) 12/28/2018    Cardiac disease 12/28/2018    Diverticulosis of intestine with bleeding 12/28/2018    Diarrhea 12/29/2018    Colorectal polyps 01/21/2019    Effusion of bursa of left elbow 09/23/2019    Cardiomegaly 01/23/2018    Chronic anxiety 01/23/2018    Chronic cystitis 12/11/2013    Chronic low back pain 08/13/2018    Congestive heart failure ([address] ) 01/23/2018    Deviated nasal septum 10/20/2017    Diverticulosis of colon 01/21/2019    Gastroesophageal reflux disease without esophagitis 03/28/2018    Gout 07/20/2016    History of angioedema 11/18/2017    History of colonic polyps 01/21/2019    Lumbar radiculopathy 08/13/2018    Macular degeneration of both eyes 04/18/2019    Obesity 02/28/2018    Osteoporosis 03/08/2018    Other specified forms of chronic ischemic heart disease 12/11/2013    Seasonal allergies 05/02/2018    Panic attack 01/23/2018    Vocal cord dysfunction 10/20/2017    Colitis presumed infectious 10/03/2019    Atrial fibrillation with rapid ventricular response ([address] ) 10/06/2019    Left elbow pain 10/06/2019    Chronic respiratory failure with hypoxia ([address] ) 10/07/2019    Debility 10/11/2019    Positive culture findings in sputum 10/12/2019     Resolved Ambulatory Problems     Diagnosis Date Noted    Rectal bleed 12/28/2018    Asthma 12/28/2018    Herpes zoster 06/20/2012     Past Medical History:   Diagnosis Date    Atrial fibrillation     Blurred vision     Colitis     Diverticulosis     DJD (degenerative joint disease)     Emphysema lung     Gait abnormality        Past Surgical History:   Procedure Laterality Date    BLADDER SURGERY      COLON SURGERY      COLONOSCOPY W/ POLYPECTOMY N/A 1/21/2019    Procedure: COLONOSCOPY W/ POLYPECTOMY;  Surgeon: Sonya Navarro MD;  Location: [address] [place] [address] GI LAB; Service: General    SMALL INTESTINE SURGERY         Social History     Socioeconomic History    Marital status:       Spouse name: None    Number of children: None    Years of education: None    Highest education level: None   Occupational History    None   Social Needs    Financial resource strain: None   Pam-Paramjit insecurity:     Worry: None     Inability: None    Transportation needs:     Medical: None     Non-medical: None   Tobacco Use    Smoking status: Former Smoker     Last attempt to quit: 2013     Years since quittin 1    Smokeless tobacco: Never Used   Substance and Sexual Activity    Alcohol use: Not Currently     Frequency: Never     Binge frequency: Never    Drug use: No    Sexual activity: Not Currently   Lifestyle    Physical activity:     Days per week: None     Minutes per session: None    Stress: None   Relationships    Social connections:     Talks on phone: None     Gets together: None     Attends [employer] service: None     Active member of club or organization: None     Attends meetings of clubs or organizations: None     Relationship status: None    Intimate partner violence:     Fear of current or ex partner: None     Emotionally abused: None     Physically abused: None     Forced sexual activity: None   Other Topics Concern    None   Social History Narrative    None       Family History: non-contributory  Review of Systems   Constitutional: Negative for chills and fever  Gastrointestinal: Positive for abdominal distention, abdominal pain, nausea and vomiting  Skin: Positive for wound  Neurological: Positive for headaches  Vitals:    20 0737   BP: 134/69   Pulse: 88   Resp: 18   Temp: 97 9 °F (36 6 °C)   SpO2: 96%       Physical Exam   Constitutional: Awake and Alert  Well-developed and well-nourished  Mild distress  HENT:   Head: Normocephalic and atraumatic  Cardiovascular: RRR with normal heart sounds  Exam reveals no friction rub  No murmur heard  Pulmonary/Chest: Effort normal and breath sounds normal  No respiratory distress  Patient has no wheezes, rales, or rhonchi  Abdominal: +Generalized distention  Bowel sounds normoactive in the lower abdominal region, and somewhat hypoactive in the upper-mid abdomen   Abdomen is hard to with mild pain to palpation in the epigastric region without rebound or guarding  Neurological: Cranial Nerves grossly intact  No sensory deficit  Coordination normal    Skin: There is a vertically linear (now closed/crusted) laceration on the left anterior crural region of the leg without drainage or bleeding  No surrounding erythema, edema or pain to palpation  Otherwise, skin is warm and dry  No rash noted  No diaphoresis  No erythema  No edema  No cyanosis  Imani Duran is a(n) 82y o  year old female with Nausea, Vomiting, Abdominal Distention/Pain    1  Nausea, Vomiting, Abdominal Pain/Distention  GI has been consulted and will do scope tomorrow  Patient is NPO with orders for small caliber NG tube for decompression  Zofran 4mg IV as needed for nausea  Mouth Kote Spray as needed for dry mouth  Normal Saline with KCl 20MEq at 75cc/hr for hydration  Morphine IV 2mg every 6 hours as needed for pains  4  Cardiac with history of hypertension, atrial fibrillation   Holding Eliquis, diltiazem, Lasix metoprolol and potassium chloride  5  COPD   Continue Breo Ellipta, albuterol, and ipratropium nebs and oxygen  Holding Singulair  6  Osteoporosis   Holding alendronate along with calcium and vitamin-D   7  DJD/osteoarthritis   In view of NPO status, continue morphine 2mg IV every 6 hours as needed for pain  8  Iron Deficiency Anemia  Holding Niferex  Prognosis: Fair  Discharge Plan: In progress  Advanced Directives: I have discussed in detail the patient the advanced directives   Patient has appointed one of her close friend Mellissaclarisse Alexis her phone number is [phone]  First contact is listed as Mo Travis who can be reached at [phone]    When discussing cardiac and pulmonary resuscitation efforts with the patient,wishes to be FULL CODE      I have spent more than 50 minutes gathering data, doing physical examination, and discussing the advanced directives, which was witnessed by caring staff  The patient was discussed with Dr Jeb Lee and he is in agreement with the above note

## 2020-01-21 NOTE — NURSING NOTE
Pt states that she feels better since ngt inserted,abd assessed,,less distended, cont with + bs in all 4 quads, pt presently in bed in no acute distress sleeping,callbell in reach of the pt and bed alarm is on

## 2020-01-21 NOTE — ED NOTES
Called PACS at approximately 30 minutes of waiting for room assignment and was told it is being worked on      John Cruz RN  01/21/20 7706
Called charge nurse at [phone] to find out status of room assignment, but no one answered phone  Advised Nursing Supervisor, Monica,of same       David Espinal RN  01/21/20 7862
Patient had small, formed brown bowel movement on commode       Keshia Estrada, RN  01/21/20 6473
Per Oleg Vital, Nursing Supervisor, patient must be held in ED until until room is cleaned by housekeeping at [phone]       Kevin Bartlett RN  01/21/20 7826
Clothing

## 2020-01-21 NOTE — SOCIAL WORK
CM met with pt at bedside and explained role  Pt reports she lives in a mobile home; [address]  Pt reports he is independent with ADLs in the home  She echeverria snot use an assistive device to ambulate  She has home oxygen 2L through [employer], has portalbe tanks  No other DME use reported  Pt does not drive and relies on her POA Sandro Hensonirn or another friend for same  Pt PCP is Dr Halle Vincent  She uses [employer] for medications and denies any barriers to obtaining them  Pt has had [facility] and [facility] in the past for [facility]  She has also been to [facility] for [facility] in the past  Pt denies any history of MH and D&A treatment  Pt is adamant she does not want [facility] or STR on discharge  She wants to return home with her POA transporting  CM will continue to follow  CM reviewed d/c planning process including the following: identifying help at home, patient preference for d/c planning needs, availability of treatment team to discuss questions or concerns patient and/or family may have regarding understanding medications and recognizing signs and symptoms once discharged  CM also encouraged patient to follow up with all recommended appointments after discharge  Patient advised of importance for patient and family to participate in managing patients medical well being

## 2020-01-21 NOTE — RESPIRATORY THERAPY NOTE
RT Protocol Note  Hannah Newman 82 y o  female MRN: [id number]  Unit/Bed#: -01 Encounter: [id number]    Assessment    Active Problems:    * No active hospital problems  *      Home Pulmonary Medications:  O2 @ 2 lpm ("mostly @ HS")  Duoneb UDN Q 6 hour w/a  Trelegy ellipta MDI Daily  Albuterol MDI Q 6 hour PRMN    Home Devices/Therapy: (P) Home O2, Other (Comment)(Duoneb UDN Q 6 w/a, Trelegy ellipta Daily, Albuterol MDI PRN)    Past Medical History:   Diagnosis Date    Asthma     Atrial fibrillation     Blurred vision     Cardiac disease     Colitis     COPD (chronic obstructive pulmonary disease)     Diverticulosis     DJD (degenerative joint disease)     Emphysema lung     Gait abnormality      Social History     Socioeconomic History    Marital status:       Spouse name: None    Number of children: None    Years of education: None    Highest education level: None   Occupational History    None   Social Needs    Financial resource strain: None    Food insecurity:     Worry: None     Inability: None    Transportation needs:     Medical: None     Non-medical: None   Tobacco Use    Smoking status: Former Smoker     Last attempt to quit: 2013     Years since quittin 1    Smokeless tobacco: Never Used   Substance and Sexual Activity    Alcohol use: Not Currently     Frequency: Never     Binge frequency: Never    Drug use: No    Sexual activity: Not Currently   Lifestyle    Physical activity:     Days per week: None     Minutes per session: None    Stress: None   Relationships    Social connections:     Talks on phone: None     Gets together: None     Attends [employer] service: None     Active member of club or organization: None     Attends meetings of clubs or organizations: None     Relationship status: None    Intimate partner violence:     Fear of current or ex partner: None     Emotionally abused: None     Physically abused: None     Forced sexual activity: None   Other Topics Concern    None   Social History Narrative    None       Subjective         Objective    Physical Exam:   Assessment Type: (P) Pre-treatment  General Appearance: (P) Alert, Awake  Respiratory Pattern: (P) Normal  Chest Assessment: (P) Chest expansion symmetrical  Bilateral Breath Sounds: (P) Diminished  Cough: (P) None  O2 Device: (P) 3 lpm NC    Vitals:  Blood pressure 134/69, pulse (P) 92, temperature 97 9 °F (36 6 °C), temperature source Tympanic, resp  rate (P) 20, height 5' (1 524 m), weight 68 9 kg (151 lb 14 4 oz), SpO2 93 %, not currently breastfeeding  Imaging and other studies: I have personally reviewed pertinent reports  O2 Device: (P) 3 lpm NC     Plan    Respiratory Plan: (P) Home Bronchodilator Patient pathway        Resp Comments: (P) Pt  assessed  No SOB or Respiratory distress noted  Pt  wears 2lpm NC ("mostly @ HS"), takes an Albuterol MDI Q6 PRN, a Trelegy ellipta MDI Daily and a Duoneb UDN Q 6 hour w/a  Nebulizer treatments will be administered, as ordered  Will continue to monitor Pt

## 2020-01-21 NOTE — CONSULTS
Consultation - Wilver Marie 82 y o  female MRN: [id number]  Unit/Bed#: -01 Encounter: [id number]      Assessment/Plan     Assessment:  Gastric outlet obstruction of uncertain etiology  Plan:  In G2 to suction today and tomorrow  Plan EGD tomorrow  Will start IV Protonix 40 mg IV q 12 hours  History of Present Illness   Physician Requesting Consult: Rafael Graham MD  Reason for Consult / Principal Problem:  Gastric outlet obstruction  Hx and PE limited by:   HPI: Daniel Vasques is a 82y o  year old female who presents with nausea and vomiting as well as abdominal distention and abdominal pain  There is no evidence of hematemesis  CT scan showed distension of the stomach and esophagus with food and fluid  The patient has never had an EGD  She did have a negative colonoscopy approximately 3 months ago  Consults    Review of Systems   Constitutional: Positive for appetite change and fatigue  HENT: Negative  Eyes: Negative  Respiratory: Positive for cough  Cardiovascular: Negative  Gastrointestinal: Positive for abdominal distention, abdominal pain, nausea and vomiting  Endocrine: Negative  Genitourinary: Negative  Musculoskeletal: Positive for arthralgias and back pain  Neurological: Negative  Hematological: Negative  Psychiatric/Behavioral: Negative  Historical Information   Past Medical History:   Diagnosis Date    Asthma     Atrial fibrillation     Blurred vision     Cardiac disease     Colitis     COPD (chronic obstructive pulmonary disease)     Diverticulosis     DJD (degenerative joint disease)     Emphysema lung     Gait abnormality      Past Surgical History:   Procedure Laterality Date    BLADDER SURGERY      COLON SURGERY      COLONOSCOPY W/ POLYPECTOMY N/A 1/21/2019    Procedure: COLONOSCOPY W/ POLYPECTOMY;  Surgeon: Veronica Cedeño MD;  Location: [address] [place] [address] GI LAB;   Service: General    SMALL INTESTINE SURGERY       Social History   Social History     Substance and Sexual Activity   Alcohol Use Not Currently    Frequency: Never    Binge frequency: Never     Social History     Substance and Sexual Activity   Drug Use No     Social History     Tobacco Use   Smoking Status Former Smoker    Last attempt to quit: 2013    Years since quittin 1   Smokeless Tobacco Never Used     Family History: non-contributory    Meds/Allergies    Current Facility-Administered Medications:     albuterol (PROVENTIL HFA,VENTOLIN HFA) inhaler 2 puff, 2 puff, Inhalation, Q6H PRN, Reinaldo Perry PA-C    fluticasone-vilanterol (BREO ELLIPTA) 100-25 mcg/inh inhaler 1 puff, 1 puff, Inhalation, Daily, Jero Perry PA-C, 1 puff at 20 1139    levalbuterol (XOPENEX) inhalation solution 1 25 mg, 1 25 mg, Nebulization, TID **AND** sodium chloride 0 9 % inhalation solution 3 mL, 3 mL, Nebulization, TID, Shanell Hernandez MD    morphine injection 2 mg, 2 mg, Intravenous, Q6H PRN, Reinaldo Perry PA-C, 2 mg at 20 1046    neomycin-bacitracin-polymyxin b (NEOSPORIN) ointment 1 small application, 1 small application, Topical, Daily, Jero Perry PA-C, 1 small application at 10/47/63 1041    ondansetron (ZOFRAN) injection 4 mg, 4 mg, Intravenous, Q6H PRN, Reinaldo ePrry PA-C, 4 mg at 20 1114    pantoprazole (PROTONIX) injection 40 mg, 40 mg, Intravenous, Q12H [address], Roddy Trevino MD    saliva substitute (MOUTH KOTE) mucosal solution 5 spray, 5 spray, Mouth/Throat, 4x Daily PRN, Reinaldo Perry PA-C, 5 spray at 20 1139    sodium chloride 0 9 % with KCl 20 mEq/L infusion (premix), 75 mL/hr, Intravenous, Continuous, Jero Perry PA-C, Last Rate: 75 mL/hr at 20 1041, 75 mL/hr at 20 1041  all current active meds have been reviewed    Allergies   Allergen Reactions    Fluticasone        Objective       Intake/Output Summary (Last 24 hours) at 2020 1357  Last data filed at 2020 1101  Gross per 24 hour Intake 1050 ml   Output 1100 ml   Net -50 ml       Invasive Devices:   Peripheral IV 01/21/20 Right Antecubital (Active)   Site Assessment Clean;Dry; Intact 1/21/2020  8:00 AM   Dressing Type Transparent 1/21/2020  8:00 AM   Line Status Blood return noted;Capped;Flushed 1/21/2020  8:00 AM   Dressing Status Clean;Dry; Intact 1/21/2020  8:00 AM   Reason Not Rotated Not due 1/21/2020  8:00 AM       NG/OG/Enteral Tube Nasogastric 16 Fr Right nares (Active)   Placement Reverification Auscultation 1/21/2020 11:01 AM   Site Assessment Dry; Intact; Clean 1/21/2020 11:01 AM   Enteral feeding tube interventions Flushed 1/21/2020 11:01 AM   Status Suction-low intermittent 1/21/2020 11:01 AM   Drainage Appearance Keely Schiller; Thick 1/21/2020 11:01 AM   Intake (mL) 50 mL 1/21/2020 11:01 AM   Output (mL) 600 mL 1/21/2020 11:01 AM       Physical Exam   Constitutional: She appears well-developed  HENT:   Head: Normocephalic  Neck: Normal range of motion  Cardiovascular: Normal rate and regular rhythm  Abdominal: Soft  Bowel sounds are normal    Musculoskeletal: Normal range of motion  Skin: Skin is warm and dry  Psychiatric: She has a normal mood and affect  Lab Results: I have personally reviewed pertinent reports  Imaging Studies: I have personally reviewed pertinent reports  EKG, Pathology, and Other Studies: I have personally reviewed pertinent reports  VTE Prophylaxis: Sequential compression device (Venodyne)     Counseling / Coordination of Care  Total floor / unit time spent today 70 minutes  Greater than 50% of total time was spent with the patient and / or family counseling and / or coordination of care  A description of the counseling / coordination of care:  Case discussed with physician assistant, patient

## 2020-01-21 NOTE — PLAN OF CARE
Pt admitted at this time  Problem: Potential for Falls  Goal: Patient will remain free of falls  Description  INTERVENTIONS:  - Assess patient frequently for physical needs  -  Identify cognitive and physical deficits and behaviors that affect risk of falls    -  [place] fall precautions as indicated by assessment   - Educate patient/family on patient safety including physical limitations  - Instruct patient to call for assistance with activity based on assessment  - Modify environment to reduce risk of injury  - Consider OT/PT consult to assist with strengthening/mobility  1/21/2020 0810 by Basil Remy RN  Outcome: Not Progressing  1/21/2020 0809 by Basil Remy RN  Outcome: Not Progressing     Problem: Prexisting or High Potential for Compromised Skin Integrity  Goal: Skin integrity is maintained or improved  Description  INTERVENTIONS:  - Identify patients at risk for skin breakdown  - Assess and monitor skin integrity  - Assess and monitor nutrition and hydration status  - Monitor labs   - Assess for incontinence   - Turn and reposition patient  - Assist with mobility/ambulation  - Relieve pressure over bony prominences  - Avoid friction and shearing  - Provide appropriate hygiene as needed including keeping skin clean and dry  - Evaluate need for skin moisturizer/barrier cream  - Collaborate with interdisciplinary team   - Patient/family teaching  - Consider wound care consult   1/21/2020 0810 by Basil Remy RN  Outcome: Not Progressing  1/21/2020 0809 by Basil Remy RN  Outcome: Not Progressing     Problem: RESPIRATORY - ADULT  Goal: Achieves optimal ventilation and oxygenation  Description  INTERVENTIONS:  - Assess for changes in respiratory status  - Assess for changes in mentation and behavior  - Position to facilitate oxygenation and minimize respiratory effort  - Oxygen administered by appropriate delivery if ordered  - Initiate smoking cessation education as indicated  - Encourage broncho-pulmonary hygiene including cough, deep breathe, Incentive Spirometry  - Assess the need for suctioning and aspirate as needed  - Assess and instruct to report SOB or any respiratory difficulty  - Respiratory Therapy support as indicated  1/21/2020 0810 by Yanelis Cotton RN  Outcome: Not Progressing  1/21/2020 0809 by Yanelis Cotton RN  Outcome: Not Progressing     Problem: GASTROINTESTINAL - ADULT  Goal: Minimal or absence of nausea and/or vomiting  Description  INTERVENTIONS:  - Administer IV fluids if ordered to ensure adequate hydration  - Maintain NPO status until nausea and vomiting are resolved  - Nasogastric tube if ordered  - Administer ordered antiemetic medications as needed  - Provide nonpharmacologic comfort measures as appropriate  - Advance diet as tolerated, if ordered  - Consider nutrition services referral to assist patient with adequate nutrition and appropriate food choices  1/21/2020 0810 by Yanelis Cotton RN  Outcome: Not Progressing  1/21/2020 0809 by Yanelis Cotton RN  Outcome: Not Progressing  Goal: Maintains or returns to baseline bowel function  Description  INTERVENTIONS:  - Assess bowel function  - Encourage oral fluids to ensure adequate hydration  - Administer IV fluids if ordered to ensure adequate hydration  - Administer ordered medications as needed  - Encourage mobilization and activity  - Consider nutritional services referral to assist patient with adequate nutrition and appropriate food choices  1/21/2020 0810 by Yanelis Cotton RN  Outcome: Not Progressing  1/21/2020 0809 by Yanelis Cotton RN  Outcome: Not Progressing  Goal: Maintains adequate nutritional intake  Description  INTERVENTIONS:  - Monitor percentage of each meal consumed  - Identify factors contributing to decreased intake, treat as appropriate  - Assist with meals as needed  - Monitor I&O, weight, and lab values if indicated  - Obtain nutrition services referral as needed  1/21/2020 0810 by Basil Remy RN  Outcome: Not Progressing  1/21/2020 0809 by Basil Remy RN  Outcome: Not Progressing  Goal: Establish and maintain optimal ostomy function  Description  INTERVENTIONS:  - Assess bowel function  - Encourage oral fluids to ensure adequate hydration  - Administer IV fluids if ordered to ensure adequate hydration   - Administer ordered medications as needed  - Encourage mobilization and activity  - Nutrition services referral to assist patient with appropriate food choices  -  - Consider wound care consult   1/21/2020 0810 by Basil Remy RN  Outcome: Not Progressing  1/21/2020 0809 by Basil Remy RN  Outcome: Not Progressing     Problem: METABOLIC, FLUID AND ELECTROLYTES - ADULT  Goal: Electrolytes maintained within normal limits  Description  INTERVENTIONS:  - Monitor labs and assess patient for signs and symptoms of electrolyte imbalances  - Administer electrolyte replacement as ordered  - Monitor response to electrolyte replacements, including repeat lab results as appropriate  - Instruct patient on fluid and nutrition as appropriate  1/21/2020 0810 by Basil Remy RN  Outcome: Not Progressing  1/21/2020 0809 by Basil Remy RN  Outcome: Not Progressing  Goal: Fluid balance maintained  Description  INTERVENTIONS:  - Monitor labs   - Monitor I/O and WT  - Instruct patient on fluid and nutrition as appropriate  - Assess for signs & symptoms of volume excess or deficit  1/21/2020 0810 by Basil Remy RN  Outcome: Not Progressing  1/21/2020 0809 by Basil Remy RN  Outcome: Not Progressing  Goal: Glucose maintained within target range  Description  INTERVENTIONS:  - Monitor Blood Glucose as ordered  - Assess for signs and symptoms of hyperglycemia and hypoglycemia  - Administer ordered medications to maintain glucose within target range  - Assess nutritional intake and initiate nutrition service referral as needed  1/21/2020 0810 by Basil Remy RN  Outcome: Not Progressing  1/21/2020 0809 by Jaydon Norris RN  Outcome: Not Progressing     Problem: SKIN/TISSUE INTEGRITY - ADULT  Goal: Skin integrity remains intact  Description  INTERVENTIONS  - Identify patients at risk for skin breakdown  - Assess and monitor skin integrity  - Assess and monitor nutrition and hydration status  - Monitor labs (i e  albumin)  - Assess for incontinence   - Turn and reposition patient  - Assist with mobility/ambulation  - Relieve pressure over bony prominences  - Avoid friction and shearing  - Provide appropriate hygiene as needed including keeping skin clean and dry  - Evaluate need for skin moisturizer/barrier cream  - Collaborate with interdisciplinary team (i e  Nutrition, Rehabilitation, etc )   - Patient/family teaching  1/21/2020 0810 by Jaydon Norris RN  Outcome: Not Progressing  1/21/2020 0809 by Jaydon Norris RN  Outcome: Not Progressing  Goal: Incision(s), wounds(s) or drain site(s) healing without S/S of infection  Description  INTERVENTIONS  - Assess and document risk factors for skin impairment   - Assess and document dressing, incision, wound bed, drain sites and surrounding tissue  - Consider nutrition services referral as needed  - Oral mucous membranes remain intact  - Provide patient/ family education  1/21/2020 0810 by Jaydon Norris RN  Outcome: Not Progressing  1/21/2020 0809 by Jaydon Norris RN  Outcome: Not Progressing  Goal: Oral mucous membranes remain intact  Description  INTERVENTIONS  - Assess oral mucosa and hygiene practices  - Implement preventative oral hygiene regimen  - Implement oral medicated treatments as ordered  - Initiate Nutrition services referral as needed  1/21/2020 0810 by Jaydon Norris RN  Outcome: Not Progressing  1/21/2020 0809 by Jaydon Norris RN  Outcome: Not Progressing     Problem: PAIN - ADULT  Goal: Verbalizes/displays adequate comfort level or baseline comfort level  Description  Interventions:  - Encourage patient to monitor pain and request assistance  - Assess pain using appropriate pain scale  - Administer analgesics based on type and severity of pain and evaluate response  - Implement non-pharmacological measures as appropriate and evaluate response    - Notify physician/advanced practitioner if interventions unsuccessful or patient reports new pain  1/21/2020 0810 by Hannah Taylor RN  Outcome: Not Progressing  1/21/2020 0809 by Hannah Taylor RN  Outcome: Not Progressing     Problem: INFECTION - ADULT  Goal: Absence or prevention of progression during hospitalization  Description  INTERVENTIONS:  - Assess and monitor for signs and symptoms of infection  - Monitor lab/diagnostic results  - Monitor all insertion sites, i e  indwelling lines, tubes, and drains    - [place] appropriate cooling/warming therapies per order  - Administer medications as ordered  - Instruct and encourage patient and family to use good hand hygiene technique  - Identify and instruct in appropriate isolation precautions for identified infection/condition  1/21/2020 0810 by Hannah Taylor RN  Outcome: Not Progressing  1/21/2020 0809 by Hannah Taylor RN  Outcome: Not Progressing  Goal: Absence of fever/infection during neutropenic period  Description  INTERVENTIONS:  - Monitor WBC    1/21/2020 0810 by Hannah Taylor RN  Outcome: Not Progressing  1/21/2020 0809 by Hannah Taylor RN  Outcome: Not Progressing     Problem: SAFETY ADULT  Goal: Maintain or return to baseline ADL function  Description  INTERVENTIONS:  -  Assess patient's ability to carry out ADLs; assess patient's baseline for ADL function and identify physical deficits which impact ability to perform ADLs (bathing, care of mouth/teeth, toileting, grooming, dressing, etc )  - Assess/evaluate cause of self-care deficits   - Assess range of motion  - Assess patient's mobility; develop plan if impaired  - Assess patient's need for assistive devices and provide as appropriate  - Encourage maximum independence but intervene and supervise when necessary  - Involve family in performance of ADLs  - Assess for home care needs following discharge   - Consider OT consult to assist with ADL evaluation and planning for discharge  - Provide patient education as appropriate  1/21/2020 0810 by Arsenio Grant RN  Outcome: Not Progressing  1/21/2020 0809 by Arsenio Grant RN  Outcome: Not Progressing  Goal: Maintain or return mobility status to optimal level  Description  INTERVENTIONS:  - Assess patient's baseline mobility status (ambulation, transfers, stairs, etc )    - Identify cognitive and physical deficits and behaviors that affect mobility  - Identify mobility aids required to assist with transfers and/or ambulation (gait belt, sit-to-stand, lift, walker, cane, etc )  - [place] fall precautions as indicated by assessment  - Record patient progress and toleration of activity level on Mobility SBAR; progress patient to next Phase/Stage  - Instruct patient to call for assistance with activity based on assessment  - Consider rehabilitation consult to assist with strengthening/weightbearing, etc   1/21/2020 0810 by Arsenio Grant RN  Outcome: Not Progressing  1/21/2020 0809 by Arsenio Grant RN  Outcome: Not Progressing     Problem: DISCHARGE PLANNING  Goal: Discharge to home or other facility with appropriate resources  Description  INTERVENTIONS:  - Identify barriers to discharge w/patient and caregiver  - Arrange for needed discharge resources and transportation as appropriate  - Identify discharge learning needs (meds, wound care, etc )  - Arrange for interpretive services to assist at discharge as needed  - Refer to Case Management Department for coordinating discharge planning if the patient needs post-hospital services based on physician/advanced practitioner order or complex needs related to functional status, cognitive ability, or social support system  1/21/2020 0810 by Jeffery Flores RN  Outcome: Not Progressing  1/21/2020 0809 by Jeffery Flores RN  Outcome: Not Progressing     Problem: Knowledge Deficit  Goal: Patient/family/caregiver demonstrates understanding of disease process, treatment plan, medications, and discharge instructions  Description  Complete learning assessment and assess knowledge base    Interventions:  - Provide teaching at level of understanding  - Provide teaching via preferred learning methods  1/21/2020 0810 by Jeffery Flores RN  Outcome: Not Progressing  1/21/2020 0809 by Jeffery Flores RN  Outcome: Not Progressing

## 2020-01-21 NOTE — NURSING NOTE
Dr Claudette Conners and Darline Hicks called and made aware of admission to the unit, also made aware of pt large distended abd along with ct scan results, will be in to see the pt

## 2020-01-21 NOTE — UTILIZATION REVIEW
Initial Clinical Review    Admission: Date/Time/Statement:  01/21/20 0412  OBSERVATION CHANGED TO INPATIENT 1-21-20 1632  FOR CONTINUED TREATMENT OF GASTRIC OUTLET OBSTRUCTION    Inpatient Admission Once     Transfer Service: Hospitalist       Question Answer   Admitting Physician DALY DELACRUZ   Level of Care Med Surg   Estimated length of stay More than 2 Midnights   Certification I certify that inpatient services are medically necessary for this patient for a duration of greater than two midnights  See H&P and MD Progress Notes for additional information about the patient's course of treatment              ED Arrival Information     Expected Arrival Acuity Means of Arrival Escorted By Service Admission Type    - 1/21/2020 01:30 Urgent Ambulance [facility] Hospitalist Urgent    Arrival Complaint    nausea  vomiting        Chief Complaint   Patient presents with    Vomiting     Assessment/Plan:      82year old female presents to ed from home via ems for evaluation and treatment of upper abdominal pain, nausea and vomiting,  PMHX  A fib and Copd  On arrival patient reports onset 2 days ago associated with subjective fever and chills  Physical examination shows bilateral 2 + lower extremity edema  normal bowel sound  Sodium 133 and potassium 3 4  Ct shows stomach distended with fluid and debris, esophagus is distended  Possible gastric outlet obstruction and aspiration  Compression fractures at T12 and L4  Treated in ed with iv fluids, iv zofran , iv reglan, aspirin and iv mso4 x1  Admit to observation for abdominal distension  Plan to consult gastroenterology     Consult gastroenterology for gastric outlet obstruction of unclear etiology  NGT to suction today and tomorrow   EGD  1-22  Start iv protonix q12 hours    Initiate aspiration precautions       ED Triage Vitals [01/21/20 0134]   98 7 °F (37 1 °C) 99 (!) 24 138/82 95 %      Temporal Monitor         Pain Score       8       01/21/20 68 9 kg (151 lb 14 4 oz)     Additional Vital Signs:  Date/Time  Temp  Pulse  Resp  BP  SpO2  O2 Device  Patient Position - Orthostatic VS   01/21/20 0737  97 9 °F (36 6 °C)  88  18  134/69  96 %  Nasal cannula  Lying   01/21/20 0445    94  20  120/77  97 %  Nasal cannula  Lying   01/21/20 0311  99 6 °F (37 6 °C)  94  20  130/75  96 %  Nasal cannula  Lying   01/21/20 0212            Nasal cannula     01/21/20 0145    92      96 %         01/21 0134 01/21 0212 01/21 0311 01/21 0445 01/21 0737     O2 Device Nasal c  Nasal c  Nasal c  Nasal c  Nasal c  O2 Flow Rate (L/min) (L/min) 4 4  2 2       Pain 7/10 10/10 7/10  0/10      Pertinent Labs/Diagnostic Test Results:     CTA chest ct abdomen pelvis w contrast   Final  (01/21 0327)      1   ev nc f pulmonary embolism  2   Emphysematous changes of lungs without focal consolidation  3   The stomach is distended with fluid and food debris  The duodenum and remainder of the small bowel is normal in caliber  Gastric outlet obstruction is not excluded  The esophagus is distended with fluid, consider aspiration precautions     4   Mild superior endplate depression at C65 new since prior exam   Moderate superior endplate compression fracture of L4, progressed since prior exam                  Results from last 7 days   Lab Units 01/21/20  0156   WBC Thousand/uL 8 20   HEMOGLOBIN g/dL 13 0   HEMATOCRIT % 40 8*   PLATELETS Thousands/uL 232   NEUTROS ABS Thousands/µL 6 50         Results from last 7 days   Lab Units 01/21/20  0156   SODIUM mmol/L 133*   POTASSIUM mmol/L 3 4*   CHLORIDE mmol/L 99   CO2 mmol/L 28   ANION GAP mmol/L 6   BUN mg/dL 12   CREATININE mg/dL 0 50*   EGFR ml/min/1 73sq m 90   CALCIUM mg/dL 9 3   MAGNESIUM mg/dL 2 5     Results from last 7 days   Lab Units 01/21/20  0156   AST U/L 16   ALT U/L 26   ALK PHOS U/L 81   TOTAL PROTEIN g/dL 7 2   ALBUMIN g/dL 4 1   TOTAL BILIRUBIN mg/dL 0 20         Results from last 7 days   Lab Units 01/21/20  0156   GLUCOSE RANDOM mg/dL 147*             Results from last 7 days   Lab Units 01/21/20  0156   CK TOTAL U/L 49     Results from last 7 days   Lab Units 01/21/20  0156   TROPONIN I ng/mL <0 03             Results from last 7 days   Lab Units 01/21/20  0156   BNP pg/mL 48             Results from last 7 days   Lab Units 01/21/20  0156   LIPASE u/L <10*           Results from last 7 days   Lab Units 01/21/20  0304   CLARITY UA  Clear   COLOR UA  Yellow   SPEC GRAV UA  1 010   PH UA  8 0*   GLUCOSE UA mg/dl Negative   KETONES UA mg/dl Trace*   BLOOD UA  Negative   PROTEIN UA mg/dl Negative   NITRITE UA  Negative   BILIRUBIN UA  Negative   UROBILINOGEN UA E U /dl 0 2   LEUKOCYTES UA  Negative         ED Treatment:   Medication Administration from 01/21/2020 0130 to 01/21/2020 2153       Date/Time Order Dose Route Action     01/21/2020 0159 sodium chloride 0 9 % bolus 1,000 mL 1,000 mL Intravenous New Bag     01/21/2020 0159 ondansetron (ZOFRAN) injection 4 mg 4 mg Intravenous Given     01/21/2020 0200 morphine injection 2 mg 2 mg Intravenous Given     01/21/2020 0418 aspirin chewable tablet 324 mg 324 mg Oral Given     01/21/2020 2572 metoclopramide (REGLAN) injection 10 mg 10 mg Intravenous Given        Past Medical History:   Diagnosis    Asthma    Atrial fibrillation    Blurred vision    Cardiac disease    Colitis    COPD (chronic obstructive pulmonary disease)    Diverticulosis    DJD (degenerative joint disease)    Emphysema lung    Gait abnormality     Present on Admission:  **None**      Admitting Diagnosis:   Vomiting [R11 10]  Chest pain [R07 9]  Abdominal pain [R10 9]  Intractable vomiting with nausea, unspecified vomiting type [R11 2]    Age/Sex: 82 y o  female     Admission Orders:    Scheduled Meds:  Current Facility-Administered Medications:  albuterol 2 puff Inhalation Q6H PRN Maria Esther Perry PA-C    fluticasone-vilanterol 1 puff Inhalation Daily Bry Chavez PA-C levalbuterol 1 25 mg Nebulization TID Maria Guadalupe Corrales MD    And        sodium chloride 3 mL Nebulization TID Maria Guadalupe Corrales MD    morphine injection 2 mg Intravenous Q6H PRN Rafael Perry PA-C    neomycin-bacitracin-polymyxin b 1 small application Topical Daily Rafael Perry PA-C    ondansetron 4 mg Intravenous Q6H PRN Rafael Perry PA-C    pantoprazole 40 mg Intravenous Q12H [address] Bill Horner MD    saliva substitute 5 spray Mouth/Throat 4x Daily PRN Rafael Perry PA-C    sodium chloride 0 9 % with KCl 20 mEq/L 75 mL/hr Intravenous Continuous Jero Perry PA-C Last Rate: 75 mL/hr (01/21/20 1041)     Continuous Infusions:  sodium chloride 0 9 % with KCl 20 mEq/L 75 mL/hr Last Rate: 75 mL/hr (01/21/20 1041)     PRN Meds:   albuterol    morphine injection    ondansetron    saliva substitute  None    Network Utilization Review Department  [email or web address]  ATTENTION: Please call with any questions or concerns to [phone] and carefully listen to the prompts so that you are directed to the right person  All voicemails are confidential   Greyson Goins all requests for admission clinical reviews, approved or denied determinations and any other requests to dedicated fax number below belonging to the campus where the patient is receiving treatment   List of dedicated fax numbers for the Facilities:  FACILITY NAME UR FAX NUMBER   ADMISSION DENIALS (Administrative/Medical Necessity) [phone]   [facility] [place] [facility] [address] [place] [address] (Maternity/NICU/Pediatrics) [phone]   Marj Zapata [phone]   [facility] [phone]   Castro Jenkins [phone]   Linda Vasquez [place] [address] [facility] [place] [address]  [address] [facility] [address] [facility] [phone]

## 2020-01-21 NOTE — ED PROCEDURE NOTE
PROCEDURE  ECG 12 Lead Documentation Only  Date/Time: 1/21/2020 4:09 AM  Performed by: Ksenia Lara MD  Authorized by: Ksenia Lara MD     Indications / Diagnosis:  Chest pain  ECG reviewed by me, the ED Provider: yes    Patient location:  ED  Previous ECG:     Comparison to cardiac monitor: Yes    Interpretation:     Interpretation: abnormal    Rate:     ECG rate:  93    ECG rate assessment: normal    Rhythm:     Rhythm: sinus rhythm    Ectopy:     Ectopy: PAC    Conduction:     Conduction: abnormal      Abnormal conduction: LAFB    ST segments:     ST segments:  Non-specific  T waves:     T waves: non-specific           Ksenia Lara MD  01/21/20 0410

## 2020-01-21 NOTE — ED PROVIDER NOTES
History  Chief Complaint   Patient presents with    Vomiting     82YEAR-OLD F    PMH:   Afib, COPD      PATIENT IS HERE FOR Upper Abdominal pain, bilateral rib pain, Nausea and non bloody vomiting    NO SOB      STATES THIS STARTED AROUND 2 DAYS AGO AND WORSENED THIS MORNING, CAME ON GRADUALLY  IT HAS BEEN COMING AND GOING, BUT IT HAS BEEN CONSTANT FOR THE LAST COUPLE OF HOURS         ASSOCIATED SYMPTOMS:  Reports decreased urination, but no other URINARY  SYMPTOMS: THERE IS NO DYSURIA, NO HEMATURIA, NO FREQUENCY      NO BACK PAIN OR FLANK PAIN      REPORTS TACTILE FEVERS AND CHILLS  DENIES LOOSE STOOLS OR DIARRHEA    NO BLOODY STOOLS - NOT BLACK OR BLOODY    DENIES CONSTIPATION      ALLEVIATING OR EXACERBATING FACTORS:  UNCERTAIN  PAIN IS NOT WORSE WITH MOVEMENT      INTERVENTIONS: NONE    Pt has left lower leg laceration that is several weeks old, she is applying triple antibiotics and dressing at home      History provided by:  Patient  Abdominal Pain   Pain location:  Epigastric  Pain quality: aching    Pain radiates to:  Chest  Pain severity:  Moderate  Onset quality:  Gradual  Timing:  Constant  Progression:  Worsening  Relieved by:  Nothing  Worsened by:  Nothing  Ineffective treatments:  None tried  Associated symptoms: chest pain, nausea and vomiting    Associated symptoms: no chills, no cough, no diarrhea, no dysuria, no fatigue, no fever, no hematemesis, no hematochezia, no hematuria, no shortness of breath and no sore throat        Prior to Admission Medications   Prescriptions Last Dose Informant Patient Reported? Taking?    ALPRAZolam (XANAX) 0 5 mg tablet   Yes No   Sig: Take 0 5 mg by mouth daily at bedtime as needed   Calcium Carb-Cholecalciferol (CALCIUM 1000 + D PO)   Yes No   Sig: Take 1,000 mg by mouth daily   albuterol (PROVENTIL HFA,VENTOLIN HFA) 90 mcg/act inhaler   No No   Sig: Inhale 2 puffs every 6 (six) hours as needed for wheezing or shortness of breath   alendronate (FOSAMAX) 70 mg tablet   Yes No   Sig: take 1 tablet by mouth every 7 days IN THE MORNING 30 MINUTES bef     (REFER TO PRESCRIPTION NOTES)     apixaban (ELIQUIS) 5 mg   No No   Sig: Take 1 tablet (5 mg total) by mouth 2 (two) times a day   cholestyramine sugar free (QUESTRAN LIGHT) 4 g packet   No No   Sig: Take 1 packet (4 g total) by mouth 2 (two) times a day   diltiazem (CARDIZEM CD) 240 mg 24 hr capsule   No No   Sig: Take 1 capsule (240 mg total) by mouth daily   fluticasone-umeclidinium-vilanterol (TRELEGY ELLIPTA) 100-62 5-25 MCG/INH inhaler   Yes No   Si puff daily   ipratropium-albuterol (DUO-NEB) 0 5-2 5 mg/3 mL nebulizer solution   No No   Sig: Take 1 vial (3 mL total) by nebulization every 6 (six) hours while awake   iron polysaccharides (FERREX) 150 mg capsule   No No   Sig: Take 1 capsule (150 mg total) by mouth 2 (two) times a day   loperamide (IMODIUM) 2 mg capsule   No No   Sig: Take 1 capsule (2 mg total) by mouth 3 (three) times a day as needed for diarrhea   metoprolol tartrate (LOPRESSOR) 25 mg tablet   No No   Sig: Take 0 5 tablets (12 5 mg total) by mouth every 12 (twelve) hours   montelukast (SINGULAIR) 10 mg tablet   No No   Sig: Take 1 tablet (10 mg total) by mouth daily at bedtime   pantoprazole (PROTONIX) 40 mg tablet   No No   Sig: Take 1 tablet (40 mg total) by mouth daily in the early morning   potassium chloride (MICRO-K) 10 MEQ CR capsule   No No   Sig: Take 1 capsule (10 mEq total) by mouth daily   psyllium (METAMUCIL) packet   No No   Sig: Take 1 packet by mouth 3 (three) times a day      Facility-Administered Medications: None       Past Medical History:   Diagnosis Date    Asthma     Atrial fibrillation     Blurred vision     Cardiac disease     Colitis     COPD (chronic obstructive pulmonary disease)     Diverticulosis     DJD (degenerative joint disease)     Emphysema lung     Gait abnormality        Past Surgical History:   Procedure Laterality Date    BLADDER SURGERY      COLON SURGERY      COLONOSCOPY W/ POLYPECTOMY N/A 2019    Procedure: COLONOSCOPY W/ POLYPECTOMY;  Surgeon: Alex Perez MD;  Location: [facility] GI [facility]; Service: General    SMALL INTESTINE SURGERY         Family History   Problem Relation Age of Onset    Heart disease Mother      I have reviewed and agree with the history as documented  Social History     Tobacco Use    Smoking status: Former Smoker     Last attempt to quit: 2013     Years since quittin 1    Smokeless tobacco: Never Used   Substance Use Topics    Alcohol use: Not Currently     Frequency: Never     Binge frequency: Never    Drug use: No        Review of Systems   Constitutional: Negative for chills, diaphoresis, fatigue and fever  HENT: Negative for sore throat  Respiratory: Negative for cough, shortness of breath, wheezing and stridor  Cardiovascular: Positive for chest pain  Negative for palpitations and leg swelling  Gastrointestinal: Positive for abdominal pain, nausea and vomiting  Negative for blood in stool, diarrhea, hematemesis and hematochezia  Genitourinary: Positive for difficulty urinating  Negative for dysuria, flank pain, frequency and hematuria  Musculoskeletal: Negative for arthralgias, back pain, gait problem, joint swelling, myalgias, neck pain and neck stiffness  Skin: Negative for rash and wound  Neurological: Negative for dizziness, light-headedness and headaches  All other systems reviewed and are negative  Physical Exam  Physical Exam   Constitutional: She is oriented to person, place, and time  She appears well-developed and well-nourished  No distress  HENT:   Head: Normocephalic and atraumatic  Nose: Nose normal    Mouth/Throat: Oropharynx is clear and moist  No oropharyngeal exudate  Eyes: Pupils are equal, round, and reactive to light  Conjunctivae and EOM are normal  Right eye exhibits no discharge  Left eye exhibits no discharge  No scleral icterus     Neck: Normal range of motion  Neck supple  No JVD present  No tracheal deviation present  Cardiovascular: Normal rate, regular rhythm, normal heart sounds and intact distal pulses  Exam reveals no gallop and no friction rub  No murmur heard  Pulmonary/Chest: Effort normal and breath sounds normal  No stridor  No respiratory distress  She has no wheezes  She has no rales  She exhibits no tenderness  Abdominal: Soft  Bowel sounds are normal  She exhibits no distension and no mass  There is no tenderness  There is no rebound and no guarding  No hernia  Musculoskeletal: Normal range of motion  She exhibits edema (Left leg, 2+ edema)  She exhibits no tenderness or deformity  Left lower left approximately 3 cm laceration, healing  It is dressed and covered - it was undressed and redressed for the exam   Lymphadenopathy:     She has no cervical adenopathy  Neurological: She is alert and oriented to person, place, and time  No cranial nerve deficit or sensory deficit  She exhibits normal muscle tone  Coordination normal    Skin: Skin is warm  Capillary refill takes less than 2 seconds  No rash noted  She is not diaphoretic  No erythema  No pallor     Psychiatric: Her behavior is normal  Judgment and thought content normal    Mild agitation from pain         Vital Signs  ED Triage Vitals [01/21/20 0134]   Temperature Pulse Respirations Blood Pressure SpO2   98 7 °F (37 1 °C) 99 (!) 24 138/82 95 %      Temp Source Heart Rate Source Patient Position - Orthostatic VS BP Location FiO2 (%)   Temporal Monitor Lying Left arm --      Pain Score       8           Vitals:    01/21/20 0134 01/21/20 0145 01/21/20 0311   BP: 138/82  130/75   Pulse: 99 92 94   Patient Position - Orthostatic VS: Lying  Lying         Visual Acuity      ED Medications  Medications   aspirin chewable tablet 324 mg (has no administration in time range)   sodium chloride 0 9 % bolus 1,000 mL (0 mL Intravenous Stopped 1/21/20 0300)   ondansetron (ZOFRAN) injection 4 mg (4 mg Intravenous Given 1/21/20 0159)   morphine injection 2 mg (2 mg Intravenous Given 1/21/20 0200)   iohexol (OMNIPAQUE) 350 MG/ML injection (SINGLE-DOSE) 100 mL (100 mL Intravenous Given 1/21/20 0251)       Diagnostic Studies  Results Reviewed     Procedure Component Value Units Date/Time    UA w Reflex to Microscopic w Reflex to Culture [[id number]]  (Abnormal) Collected:  01/21/20 0304    Lab Status:  Final result Specimen:  Urine, Clean Catch Updated:  01/21/20 0321     Color, UA Yellow     Clarity, UA Clear     Specific Gravity, UA 1 010     pH, UA 8 0     Leukocytes, UA Negative     Nitrite, UA Negative     Protein, UA Negative mg/dl      Glucose, UA Negative mg/dl      Ketones, UA Trace mg/dl      Urobilinogen, UA 0 2 E U /dl      Bilirubin, UA Negative     Blood, UA Negative    CK Total with Reflex CKMB [[id number]]  (Normal) Collected:  01/21/20 0156    Lab Status:  Final result Specimen:  Blood from Arm, Right Updated:  01/21/20 0227     Total CK 49 U/L     Magnesium [[id number]]  (Normal) Collected:  01/21/20 0156    Lab Status:  Final result Specimen:  Blood from Arm, Right Updated:  01/21/20 0227     Magnesium 2 5 mg/dL     Lipase [[id number]]  (Abnormal) Collected:  01/21/20 0156    Lab Status:  Final result Specimen:  Blood from Arm, Right Updated:  01/21/20 0227     Lipase <10 u/L     Troponin I [[id number]]  (Normal) Collected:  01/21/20 0156    Lab Status:  Final result Specimen:  Blood from Arm, Right Updated:  01/21/20 0227     Troponin I <0 03 ng/mL     B-Type Natriuretic Peptide ([address] [place] [address]) [[id number]]  (Normal) Collected:  01/21/20 0156    Lab Status:  Final result Specimen:  Blood from Arm, Right Updated:  01/21/20 0227     BNP 48 pg/mL     CMP [[id number]]  (Abnormal) Collected:  01/21/20 0156    Lab Status:  Final result Specimen:  Blood from Arm, Right Updated:  01/21/20 0227     Sodium 133 mmol/L      Potassium 3 4 mmol/L      Chloride 99 mmol/L      CO2 28 mmol/L ANION GAP 6 mmol/L      BUN 12 mg/dL      Creatinine 0 50 mg/dL      Glucose 147 mg/dL      Calcium 9 3 mg/dL      AST 16 U/L      ALT 26 U/L      Alkaline Phosphatase 81 U/L      Total Protein 7 2 g/dL      Albumin 4 1 g/dL      Total Bilirubin 0 20 mg/dL      eGFR 90 ml/min/1 73sq m     Narrative:       National Kidney Disease Foundation guidelines for Chronic Kidney Disease (CKD):     Stage 1 with normal or high GFR (GFR > 90 mL/min/1 73 square meters)    Stage 2 Mild CKD (GFR = 60-89 mL/min/1 73 square meters)    Stage 3A Moderate CKD (GFR = 45-59 mL/min/1 73 square meters)    Stage 3B Moderate CKD (GFR = 30-44 mL/min/1 73 square meters)    Stage 4 Severe CKD (GFR = 15-29 mL/min/1 73 square meters)    Stage 5 End Stage CKD (GFR <15 mL/min/1 73 square meters)  Note: GFR calculation is accurate only with a steady state creatinine    CBC and differential [[id number]]  (Abnormal) Collected:  01/21/20 0156    Lab Status:  Final result Specimen:  Blood from Arm, Right Updated:  01/21/20 0211     WBC 8 20 Thousand/uL      RBC 4 86 Million/uL      Hemoglobin 13 0 g/dL      Hematocrit 40 8 %      MCV 84 fL      MCH 26 7 pg      MCHC 31 8 g/dL      RDW 17 1 %      MPV 7 4 fL      Platelets 418 Thousands/uL      Neutrophils Relative 79 %      Lymphocytes Relative 10 %      Monocytes Relative 9 %      Eosinophils Relative 1 %      Basophils Relative 1 %      Neutrophils Absolute 6 50 Thousands/µL      Lymphocytes Absolute 0 80 Thousands/µL      Monocytes Absolute 0 80 Thousand/µL      Eosinophils Absolute 0 10 Thousand/µL      Basophils Absolute 0 10 Thousands/µL              CT PULMONARY ANGIOGRAM OF THE CHEST AND CT ABDOMEN AND PELVIS WITH INTRAVENOUS CONTRAST        FINDINGS:     CHEST     PULMONARY ARTERIAL TREE:  No evidence of pulmonary embolism      LUNGS:  Emphysematous changes of the lungs  No focal consolidation  Small scattered calcified granulomas    The central airways are patent      PLEURA: Unremarkable      HEART/AORTA:  Unremarkable for patient's age      MEDIASTINUM AND TONY:  A precarinal lymph node measures 1 cm in short axis, nonspecific      CHEST WALL AND LOWER NECK: 1 2 cm right thyroid nodule  Incidental discovery of one or more thyroid nodule(s) measuring less than 1 5 cm and without suspicious features is noted in this patient who is above 28years old; according to guidelines   published in the February 2015 white paper on incidental thyroid nodules in the Journal of the [employer] of Radiology [facility]), no further evaluation is recommended       ABDOMEN     LIVER/BILIARY TREE:  Hepatic steatosis      GALLBLADDER:  No calcified gallstones  No pericholecystic inflammatory change      SPLEEN:  Unremarkable      PANCREAS:  Atrophic fatty replaced pancreas      ADRENAL GLANDS:  Unremarkable      KIDNEYS/URETERS:  One or more simple renal cyst(s) is noted  Otherwise unremarkable kidneys  No hydronephrosis      STOMACH AND BOWEL:  Esophagus is distended with fluid  The stomach is distended with fluid and food debris  The duodenum and remainder of the small bowel is normal in caliber      APPENDIX:  No findings to suggest appendicitis      ABDOMINOPELVIC CAVITY:  No ascites or free intraperitoneal air  No lymphadenopathy      VESSELS:  Mild atherosclerotic calcifications      PELVIS     REPRODUCTIVE ORGANS:  Unremarkable for patient's age      URINARY BLADDER:  Unremarkable      ABDOMINAL WALL/INGUINAL REGIONS:  Fat-containing ventral abdominal wall hernia      OSSEOUS STRUCTURES:  No acute fracture or destructive osseous lesion  Mild superior endplate depression at L23 new since prior examination  Moderate superior endplate compression fracture at L4, progressed      IMPRESSION:     CTA chest ct abdomen pelvis w contrast   Final Result by Maurice Velasquez MD (01/21 0984)      1   ev nc f pulmonary embolism  2   Emphysematous changes of lungs without focal consolidation     3   The stomach is distended with fluid and food debris  The duodenum and remainder of the small bowel is normal in caliber  Gastric outlet obstruction is not excluded  The esophagus is distended with fluid, consider aspiration precautions  4   Mild superior endplate depression at R61 new since prior exam   Moderate superior endplate compression fracture of L4, progressed since prior exam                            Workstation performed: [id number]                    Procedures  Procedures         ED Course  ED Course as of Jan 21 0410   Tue Jan 21, 2020   6517 Pt has less than 100 cc in bladder  No dyole placed      0306 Lipase(!): <10   0306 Troponin I: <0 03   0306 Total CK: 49   0306 WBC: 8 20   0306 Hemoglobin: 13 0   0306 HCT(!): 40 8   0306 MCV: 84   0306 MCH: 26 7   0306 MCHC: 31 8   0306 RDW(!): 17 1   0306 Platelet Count: 987   0306 Neutrophils %(!): 79   0306 Lymphocytes Relative(!): 10   0306 Monocytes Relative: 9   0306 Sodium(!): 133   0306 Potassium(!): 3 4   0306 Chloride: 99   0306 CO2: 28   0306 Anion Gap: 6   0306 BUN: 12   0306 Creatinine(!): 0 50   0306 Glucose, Random(!): 147   0306 AST: 16   0306 ALT: 26   0306 Alkaline Phosphatase: 81   0306 Total Protein: 7 2   0306 BNP: 48   0306 Magnesium: 2 5   0331 Leukocytes, UA: Negative   0332 Nitrite, UA: Negative   0334 CTA chest/CT ab results reviewed  WILL ADMIT FOR FURTHER GI EVALUATION AS WELL AS CARDIAC WORKUP    PATIENT IS AGREEABLE      0406 D/w Dr Charley Banerjee pt to med surg [facility] for cardiac evaluation and further GI evaluation                                 Rosette Pham' Criteria for PE      Most Recent Value   Wells' Criteria for PE   Clinical signs and symptoms of DVT  3 Filed at: 01/21/2020 0134   PE is primary diagnosis or equally likely  3 Filed at: 01/21/2020 0134   HR >100  0 Filed at: 01/21/2020 0134   Immobilization at least 3 days or Surgery in the previous 4 weeks  1 5 Filed at: 01/21/2020 0134   Previous, objectively diagnosed PE or DVT  0 Filed at: 01/21/2020 0134   Hemoptysis  0 Filed at: 01/21/2020 0134   Malignancy with treatment within 6 months or palliative  0 Filed at: 01/21/2020 0134   Wells' Criteria Total  7 5 Filed at: 01/21/2020 0134            MDM      Disposition  Final diagnoses:   None     ED Disposition     None      Follow-up Information    None         Patient's Medications   Discharge Prescriptions    No medications on file     No discharge procedures on file      ED Provider  Electronically Signed by           Jessica Ramires MD  01/21/20 [address], [place]  01/21/20 Keyla Harris [facility] 69 Lilliam Elam MD  01/21/20 6934

## 2020-01-21 NOTE — NURSING NOTE
Arjun ortega was in to see and eval the pt,admit orders received, ngt inserted assisted by 2nd rn,pt tolerated well,to lis,600cc drainage noted after insertion,left shin dsg change done as per order and the pt tolerated well,callbell in reach of the pt and the bed alarm is on

## 2020-01-22 ENCOUNTER — APPOINTMENT (INPATIENT)
Dept: GASTROENTEROLOGY | Facility: HOSPITAL | Age: 85
DRG: 380 | End: 2020-01-22
Attending: INTERNAL MEDICINE
Payer: MEDICARE

## 2020-01-22 ENCOUNTER — ANESTHESIA (INPATIENT)
Dept: GASTROENTEROLOGY | Facility: HOSPITAL | Age: 85
DRG: 380 | End: 2020-01-22
Payer: MEDICARE

## 2020-01-22 ENCOUNTER — APPOINTMENT (OUTPATIENT)
Dept: RADIOLOGY | Facility: HOSPITAL | Age: 85
DRG: 326 | End: 2020-01-22
Payer: MEDICARE

## 2020-01-22 ENCOUNTER — HOSPITAL ENCOUNTER (INPATIENT)
Facility: HOSPITAL | Age: 85
LOS: 23 days | Discharge: HOME HEALTH | DRG: 326 | End: 2020-02-14
Attending: SURGERY | Admitting: SURGERY
Payer: MEDICARE

## 2020-01-22 ENCOUNTER — APPOINTMENT (INPATIENT)
Dept: RADIOLOGY | Facility: HOSPITAL | Age: 85
DRG: 326 | End: 2020-01-22
Payer: MEDICARE

## 2020-01-22 VITALS
BODY MASS INDEX: 29.82 KG/M2 | HEART RATE: 132 BPM | HEIGHT: 60 IN | TEMPERATURE: 99 F | DIASTOLIC BLOOD PRESSURE: 63 MMHG | WEIGHT: 151.9 LBS | RESPIRATION RATE: 22 BRPM | OXYGEN SATURATION: 93 % | SYSTOLIC BLOOD PRESSURE: 139 MMHG

## 2020-01-22 DIAGNOSIS — S22.080A CLOSED WEDGE COMPRESSION FRACTURE OF TWELFTH THORACIC VERTEBRA, INITIAL ENCOUNTER: ICD-10-CM

## 2020-01-22 DIAGNOSIS — M72.6 NECROTIZING FASCIITIS (HCC): ICD-10-CM

## 2020-01-22 DIAGNOSIS — S22.080A T12 COMPRESSION FRACTURE (HCC): ICD-10-CM

## 2020-01-22 DIAGNOSIS — S32.040A COMPRESSION FRACTURE OF L4 VERTEBRA, INITIAL ENCOUNTER (HCC): ICD-10-CM

## 2020-01-22 DIAGNOSIS — K31.1 GASTRIC OUTLET OBSTRUCTION: Primary | ICD-10-CM

## 2020-01-22 DIAGNOSIS — I48.91 ATRIAL FIBRILLATION WITH RAPID VENTRICULAR RESPONSE (HCC): ICD-10-CM

## 2020-01-22 LAB
ALBUMIN SERPL BCP-MCNC: 3.1 G/DL (ref 3.5–5)
ALBUMIN SERPL BCP-MCNC: 3.9 G/DL (ref 3.5–5.7)
ALP SERPL-CCNC: 111 U/L (ref 46–116)
ALP SERPL-CCNC: 73 U/L (ref 55–165)
ALT SERPL W P-5'-P-CCNC: 26 U/L (ref 7–52)
ALT SERPL W P-5'-P-CCNC: 42 U/L (ref 12–78)
ANION GAP SERPL CALCULATED.3IONS-SCNC: 10 MMOL/L (ref 4–13)
ANION GAP SERPL CALCULATED.3IONS-SCNC: 9 MMOL/L (ref 4–13)
ANISOCYTOSIS BLD QL SMEAR: PRESENT
APTT PPP: 28 SECONDS (ref 23–37)
AST SERPL W P-5'-P-CCNC: 21 U/L (ref 13–39)
AST SERPL W P-5'-P-CCNC: 24 U/L (ref 5–45)
ATRIAL RATE: 93 BPM
BASOPHILS # BLD AUTO: 0 THOUSANDS/ΜL (ref 0–0.1)
BASOPHILS # BLD AUTO: 0.02 THOUSANDS/ΜL (ref 0–0.1)
BASOPHILS NFR BLD AUTO: 0 % (ref 0–1)
BASOPHILS NFR BLD AUTO: 0 % (ref 0–2)
BILIRUB SERPL-MCNC: 0.3 MG/DL (ref 0.2–1)
BILIRUB SERPL-MCNC: 0.38 MG/DL (ref 0.2–1)
BUN SERPL-MCNC: 7 MG/DL (ref 5–25)
BUN SERPL-MCNC: 7 MG/DL (ref 7–25)
CALCIUM SERPL-MCNC: 8.9 MG/DL (ref 8.3–10.1)
CALCIUM SERPL-MCNC: 8.9 MG/DL (ref 8.6–10.5)
CHLORIDE SERPL-SCNC: 101 MMOL/L (ref 100–108)
CHLORIDE SERPL-SCNC: 101 MMOL/L (ref 98–107)
CO2 SERPL-SCNC: 25 MMOL/L (ref 21–31)
CO2 SERPL-SCNC: 25 MMOL/L (ref 21–32)
CREAT SERPL-MCNC: 0.48 MG/DL (ref 0.6–1.2)
CREAT SERPL-MCNC: 0.48 MG/DL (ref 0.6–1.3)
EOSINOPHIL # BLD AUTO: 0 THOUSAND/ΜL (ref 0–0.61)
EOSINOPHIL # BLD AUTO: 0.1 THOUSAND/ΜL (ref 0–0.61)
EOSINOPHIL NFR BLD AUTO: 0 % (ref 0–6)
EOSINOPHIL NFR BLD AUTO: 2 % (ref 0–5)
ERYTHROCYTE [DISTWIDTH] IN BLOOD BY AUTOMATED COUNT: 15.8 % (ref 11.6–15.1)
ERYTHROCYTE [DISTWIDTH] IN BLOOD BY AUTOMATED COUNT: 16.6 % (ref 11.5–14.5)
GFR SERPL CREATININE-BSD FRML MDRD: 92 ML/MIN/1.73SQ M
GFR SERPL CREATININE-BSD FRML MDRD: 92 ML/MIN/1.73SQ M
GLUCOSE SERPL-MCNC: 108 MG/DL (ref 65–140)
GLUCOSE SERPL-MCNC: 112 MG/DL (ref 65–140)
GLUCOSE SERPL-MCNC: 99 MG/DL (ref 65–99)
HCT VFR BLD AUTO: 38.9 % (ref 34.8–46.1)
HCT VFR BLD AUTO: 39.7 % (ref 42–47)
HGB BLD-MCNC: 12.3 G/DL (ref 11.5–15.4)
HGB BLD-MCNC: 12.3 G/DL (ref 12–16)
IMM GRANULOCYTES # BLD AUTO: 0.12 THOUSAND/UL (ref 0–0.2)
IMM GRANULOCYTES NFR BLD AUTO: 1 % (ref 0–2)
INR PPP: 1.09 (ref 0.84–1.19)
LYMPHOCYTES # BLD AUTO: 0.88 THOUSANDS/ΜL (ref 0.6–4.47)
LYMPHOCYTES # BLD AUTO: 0.9 THOUSANDS/ΜL (ref 0.6–4.47)
LYMPHOCYTES NFR BLD AUTO: 15 % (ref 21–51)
LYMPHOCYTES NFR BLD AUTO: 6 % (ref 14–44)
MAGNESIUM SERPL-MCNC: 2.4 MG/DL (ref 1.6–2.6)
MCH RBC QN AUTO: 26.4 PG (ref 26–34)
MCH RBC QN AUTO: 27.2 PG (ref 26.8–34.3)
MCHC RBC AUTO-ENTMCNC: 31 G/DL (ref 31–37)
MCHC RBC AUTO-ENTMCNC: 31.6 G/DL (ref 31.4–37.4)
MCV RBC AUTO: 85 FL (ref 81–99)
MCV RBC AUTO: 86 FL (ref 82–98)
MONOCYTES # BLD AUTO: 0.7 THOUSAND/ΜL (ref 0.17–1.22)
MONOCYTES # BLD AUTO: 0.76 THOUSAND/ΜL (ref 0.17–1.22)
MONOCYTES NFR BLD AUTO: 11 % (ref 2–12)
MONOCYTES NFR BLD AUTO: 5 % (ref 4–12)
NEUTROPHILS # BLD AUTO: 14.17 THOUSANDS/ΜL (ref 1.85–7.62)
NEUTROPHILS # BLD AUTO: 4.6 THOUSANDS/ΜL (ref 1.4–6.5)
NEUTS SEG NFR BLD AUTO: 72 % (ref 42–75)
NEUTS SEG NFR BLD AUTO: 88 % (ref 43–75)
NRBC BLD AUTO-RTO: 0 /100 WBCS
OVALOCYTES BLD QL SMEAR: PRESENT
P AXIS: 71 DEGREES
PHOSPHATE SERPL-MCNC: 3.7 MG/DL (ref 2.3–4.1)
PLATELET # BLD AUTO: 199 THOUSANDS/UL (ref 149–390)
PLATELET # BLD AUTO: 235 THOUSANDS/UL (ref 149–390)
PLATELET BLD QL SMEAR: ADEQUATE
PMV BLD AUTO: 10.8 FL (ref 8.9–12.7)
PMV BLD AUTO: 8 FL (ref 8.6–11.7)
POTASSIUM SERPL-SCNC: 3.6 MMOL/L (ref 3.5–5.3)
POTASSIUM SERPL-SCNC: 4.1 MMOL/L (ref 3.5–5.5)
PR INTERVAL: 144 MS
PROT SERPL-MCNC: 6.7 G/DL (ref 6.4–8.9)
PROT SERPL-MCNC: 7.1 G/DL (ref 6.4–8.2)
PROTHROMBIN TIME: 13.7 SECONDS (ref 11.6–14.5)
QRS AXIS: -63 DEGREES
QRSD INTERVAL: 90 MS
QT INTERVAL: 386 MS
QTC INTERVAL: 479 MS
RBC # BLD AUTO: 4.53 MILLION/UL (ref 3.81–5.12)
RBC # BLD AUTO: 4.66 MILLION/UL (ref 3.9–5.2)
RBC MORPH BLD: PRESENT
SODIUM SERPL-SCNC: 135 MMOL/L (ref 134–143)
SODIUM SERPL-SCNC: 136 MMOL/L (ref 136–145)
T WAVE AXIS: 67 DEGREES
VENTRICULAR RATE: 93 BPM
WBC # BLD AUTO: 15.95 THOUSAND/UL (ref 4.31–10.16)
WBC # BLD AUTO: 6.4 THOUSAND/UL (ref 4.8–10.8)

## 2020-01-22 PROCEDURE — 85610 PROTHROMBIN TIME: CPT | Performed by: EMERGENCY MEDICINE

## 2020-01-22 PROCEDURE — 99221 1ST HOSP IP/OBS SF/LOW 40: CPT | Performed by: SPECIALIST

## 2020-01-22 PROCEDURE — 74018 RADEX ABDOMEN 1 VIEW: CPT

## 2020-01-22 PROCEDURE — C9113 INJ PANTOPRAZOLE SODIUM, VIA: HCPCS | Performed by: FAMILY MEDICINE

## 2020-01-22 PROCEDURE — 0DJ08ZZ INSPECTION OF UPPER INTESTINAL TRACT, VIA NATURAL OR ARTIFICIAL OPENING ENDOSCOPIC: ICD-10-PCS | Performed by: INTERNAL MEDICINE

## 2020-01-22 PROCEDURE — 84100 ASSAY OF PHOSPHORUS: CPT | Performed by: EMERGENCY MEDICINE

## 2020-01-22 PROCEDURE — 99223 1ST HOSP IP/OBS HIGH 75: CPT | Performed by: SURGERY

## 2020-01-22 PROCEDURE — 94640 AIRWAY INHALATION TREATMENT: CPT

## 2020-01-22 PROCEDURE — 83735 ASSAY OF MAGNESIUM: CPT | Performed by: EMERGENCY MEDICINE

## 2020-01-22 PROCEDURE — C9113 INJ PANTOPRAZOLE SODIUM, VIA: HCPCS | Performed by: INTERNAL MEDICINE

## 2020-01-22 PROCEDURE — 94760 N-INVAS EAR/PLS OXIMETRY 1: CPT

## 2020-01-22 PROCEDURE — 80053 COMPREHEN METABOLIC PANEL: CPT | Performed by: EMERGENCY MEDICINE

## 2020-01-22 PROCEDURE — 93010 ELECTROCARDIOGRAM REPORT: CPT | Performed by: INTERNAL MEDICINE

## 2020-01-22 PROCEDURE — 85025 COMPLETE CBC W/AUTO DIFF WBC: CPT | Performed by: PHYSICIAN ASSISTANT

## 2020-01-22 PROCEDURE — 93005 ELECTROCARDIOGRAM TRACING: CPT

## 2020-01-22 PROCEDURE — 80053 COMPREHEN METABOLIC PANEL: CPT | Performed by: PHYSICIAN ASSISTANT

## 2020-01-22 PROCEDURE — 85025 COMPLETE CBC W/AUTO DIFF WBC: CPT | Performed by: EMERGENCY MEDICINE

## 2020-01-22 PROCEDURE — 82948 REAGENT STRIP/BLOOD GLUCOSE: CPT

## 2020-01-22 PROCEDURE — NC001 PR NO CHARGE: Performed by: SURGERY

## 2020-01-22 PROCEDURE — 71045 X-RAY EXAM CHEST 1 VIEW: CPT

## 2020-01-22 PROCEDURE — 85730 THROMBOPLASTIN TIME PARTIAL: CPT | Performed by: EMERGENCY MEDICINE

## 2020-01-22 RX ORDER — ALBUTEROL SULFATE 90 UG/1
2 AEROSOL, METERED RESPIRATORY (INHALATION) EVERY 6 HOURS PRN
Status: DISCONTINUED | OUTPATIENT
Start: 2020-01-22 | End: 2020-01-22

## 2020-01-22 RX ORDER — SODIUM CHLORIDE AND POTASSIUM CHLORIDE .9; .15 G/100ML; G/100ML
75 SOLUTION INTRAVENOUS CONTINUOUS
Status: CANCELLED | OUTPATIENT
Start: 2020-01-22

## 2020-01-22 RX ORDER — ACETAMINOPHEN 650 MG/1
650 SUPPOSITORY RECTAL EVERY 6 HOURS PRN
Status: DISCONTINUED | OUTPATIENT
Start: 2020-01-22 | End: 2020-01-30

## 2020-01-22 RX ORDER — LORAZEPAM 2 MG/ML
0.25 INJECTION INTRAMUSCULAR EVERY 4 HOURS PRN
Status: DISCONTINUED | OUTPATIENT
Start: 2020-01-22 | End: 2020-01-22 | Stop reason: HOSPADM

## 2020-01-22 RX ORDER — BACITRACIN, NEOMYCIN, POLYMYXIN B 400; 3.5; 5 [USP'U]/G; MG/G; [USP'U]/G
1 OINTMENT TOPICAL DAILY
Status: CANCELLED | OUTPATIENT
Start: 2020-01-23

## 2020-01-22 RX ORDER — PANTOPRAZOLE SODIUM 40 MG/1
40 INJECTION, POWDER, FOR SOLUTION INTRAVENOUS EVERY 12 HOURS SCHEDULED
Status: DISCONTINUED | OUTPATIENT
Start: 2020-01-22 | End: 2020-02-14 | Stop reason: HOSPADM

## 2020-01-22 RX ORDER — LORAZEPAM 2 MG/ML
0.25 INJECTION INTRAMUSCULAR
Status: DISCONTINUED | OUTPATIENT
Start: 2020-01-22 | End: 2020-02-03

## 2020-01-22 RX ORDER — ACETAMINOPHEN 325 MG/1
650 TABLET ORAL EVERY 4 HOURS PRN
Status: DISCONTINUED | OUTPATIENT
Start: 2020-01-22 | End: 2020-01-22

## 2020-01-22 RX ORDER — PROPOFOL 10 MG/ML
INJECTION, EMULSION INTRAVENOUS AS NEEDED
Status: DISCONTINUED | OUTPATIENT
Start: 2020-01-22 | End: 2020-01-22 | Stop reason: SURG

## 2020-01-22 RX ORDER — METOPROLOL TARTRATE 5 MG/5ML
2.5 INJECTION INTRAVENOUS EVERY 6 HOURS
Status: DISCONTINUED | OUTPATIENT
Start: 2020-01-22 | End: 2020-01-28

## 2020-01-22 RX ORDER — SODIUM CHLORIDE FOR INHALATION 0.9 %
3 VIAL, NEBULIZER (ML) INHALATION
Status: DISCONTINUED | OUTPATIENT
Start: 2020-01-22 | End: 2020-01-22

## 2020-01-22 RX ORDER — ALBUTEROL SULFATE 90 UG/1
2 AEROSOL, METERED RESPIRATORY (INHALATION) EVERY 6 HOURS PRN
Status: DISCONTINUED | OUTPATIENT
Start: 2020-01-22 | End: 2020-01-23

## 2020-01-22 RX ORDER — SODIUM CHLORIDE, SODIUM LACTATE, POTASSIUM CHLORIDE, CALCIUM CHLORIDE 600; 310; 30; 20 MG/100ML; MG/100ML; MG/100ML; MG/100ML
INJECTION, SOLUTION INTRAVENOUS CONTINUOUS PRN
Status: DISCONTINUED | OUTPATIENT
Start: 2020-01-22 | End: 2020-01-22 | Stop reason: SURG

## 2020-01-22 RX ORDER — BACITRACIN, NEOMYCIN, POLYMYXIN B 400; 3.5; 5 [USP'U]/G; MG/G; [USP'U]/G
1 OINTMENT TOPICAL DAILY
Status: DISCONTINUED | OUTPATIENT
Start: 2020-01-23 | End: 2020-02-14 | Stop reason: HOSPADM

## 2020-01-22 RX ORDER — HYDROMORPHONE HCL/PF 1 MG/ML
0.4 SYRINGE (ML) INJECTION
Status: DISCONTINUED | OUTPATIENT
Start: 2020-01-22 | End: 2020-01-23

## 2020-01-22 RX ORDER — ALBUTEROL SULFATE 90 UG/1
2 AEROSOL, METERED RESPIRATORY (INHALATION) EVERY 6 HOURS PRN
Status: CANCELLED | OUTPATIENT
Start: 2020-01-22

## 2020-01-22 RX ORDER — SODIUM CHLORIDE, SODIUM GLUCONATE, SODIUM ACETATE, POTASSIUM CHLORIDE, MAGNESIUM CHLORIDE, SODIUM PHOSPHATE, DIBASIC, AND POTASSIUM PHOSPHATE .53; .5; .37; .037; .03; .012; .00082 G/100ML; G/100ML; G/100ML; G/100ML; G/100ML; G/100ML; G/100ML
1000 INJECTION, SOLUTION INTRAVENOUS ONCE
Status: COMPLETED | OUTPATIENT
Start: 2020-01-22 | End: 2020-01-22

## 2020-01-22 RX ORDER — XYLITOL/YERBA SANTA
5 AEROSOL, SPRAY WITH PUMP (ML) MUCOUS MEMBRANE 4 TIMES DAILY PRN
Status: DISCONTINUED | OUTPATIENT
Start: 2020-01-22 | End: 2020-02-14 | Stop reason: HOSPADM

## 2020-01-22 RX ORDER — LEVALBUTEROL 1.25 MG/.5ML
1.25 SOLUTION, CONCENTRATE RESPIRATORY (INHALATION)
Status: CANCELLED | OUTPATIENT
Start: 2020-01-22

## 2020-01-22 RX ORDER — HEPARIN SODIUM 5000 [USP'U]/ML
5000 INJECTION, SOLUTION INTRAVENOUS; SUBCUTANEOUS EVERY 8 HOURS SCHEDULED
Status: DISCONTINUED | OUTPATIENT
Start: 2020-01-22 | End: 2020-02-14 | Stop reason: HOSPADM

## 2020-01-22 RX ORDER — XYLITOL/YERBA SANTA
5 AEROSOL, SPRAY WITH PUMP (ML) MUCOUS MEMBRANE 4 TIMES DAILY PRN
Status: CANCELLED | OUTPATIENT
Start: 2020-01-22

## 2020-01-22 RX ORDER — LEVALBUTEROL 1.25 MG/.5ML
1.25 SOLUTION, CONCENTRATE RESPIRATORY (INHALATION)
Status: DISCONTINUED | OUTPATIENT
Start: 2020-01-22 | End: 2020-01-22

## 2020-01-22 RX ORDER — LORAZEPAM 2 MG/ML
0.25 INJECTION INTRAMUSCULAR EVERY 4 HOURS PRN
Status: CANCELLED | OUTPATIENT
Start: 2020-01-22

## 2020-01-22 RX ORDER — FLUTICASONE FUROATE AND VILANTEROL 100; 25 UG/1; UG/1
1 POWDER RESPIRATORY (INHALATION) DAILY
Status: DISCONTINUED | OUTPATIENT
Start: 2020-01-23 | End: 2020-01-22

## 2020-01-22 RX ORDER — ONDANSETRON 2 MG/ML
4 INJECTION INTRAMUSCULAR; INTRAVENOUS EVERY 6 HOURS PRN
Status: CANCELLED | OUTPATIENT
Start: 2020-01-22

## 2020-01-22 RX ORDER — SODIUM CHLORIDE, SODIUM GLUCONATE, SODIUM ACETATE, POTASSIUM CHLORIDE, MAGNESIUM CHLORIDE, SODIUM PHOSPHATE, DIBASIC, AND POTASSIUM PHOSPHATE .53; .5; .37; .037; .03; .012; .00082 G/100ML; G/100ML; G/100ML; G/100ML; G/100ML; G/100ML; G/100ML
75 INJECTION, SOLUTION INTRAVENOUS CONTINUOUS
Status: DISCONTINUED | OUTPATIENT
Start: 2020-01-22 | End: 2020-01-24

## 2020-01-22 RX ORDER — SODIUM CHLORIDE, SODIUM GLUCONATE, SODIUM ACETATE, POTASSIUM CHLORIDE, MAGNESIUM CHLORIDE, SODIUM PHOSPHATE, DIBASIC, AND POTASSIUM PHOSPHATE .53; .5; .37; .037; .03; .012; .00082 G/100ML; G/100ML; G/100ML; G/100ML; G/100ML; G/100ML; G/100ML
100 INJECTION, SOLUTION INTRAVENOUS CONTINUOUS
Status: DISCONTINUED | OUTPATIENT
Start: 2020-01-22 | End: 2020-01-22

## 2020-01-22 RX ORDER — IPRATROPIUM BROMIDE AND ALBUTEROL SULFATE 2.5; .5 MG/3ML; MG/3ML
3 SOLUTION RESPIRATORY (INHALATION)
Status: DISCONTINUED | OUTPATIENT
Start: 2020-01-22 | End: 2020-01-24

## 2020-01-22 RX ORDER — FLUTICASONE FUROATE AND VILANTEROL 100; 25 UG/1; UG/1
1 POWDER RESPIRATORY (INHALATION) DAILY
Status: CANCELLED | OUTPATIENT
Start: 2020-01-23

## 2020-01-22 RX ORDER — SODIUM CHLORIDE AND POTASSIUM CHLORIDE .9; .15 G/100ML; G/100ML
75 SOLUTION INTRAVENOUS CONTINUOUS
Status: DISCONTINUED | OUTPATIENT
Start: 2020-01-22 | End: 2020-01-22

## 2020-01-22 RX ORDER — PANTOPRAZOLE SODIUM 40 MG/1
40 INJECTION, POWDER, FOR SOLUTION INTRAVENOUS EVERY 12 HOURS SCHEDULED
Status: CANCELLED | OUTPATIENT
Start: 2020-01-22

## 2020-01-22 RX ORDER — SODIUM CHLORIDE FOR INHALATION 0.9 %
3 VIAL, NEBULIZER (ML) INHALATION
Status: CANCELLED | OUTPATIENT
Start: 2020-01-22

## 2020-01-22 RX ORDER — HYDROMORPHONE HCL/PF 1 MG/ML
0.2 SYRINGE (ML) INJECTION EVERY 4 HOURS PRN
Status: DISCONTINUED | OUTPATIENT
Start: 2020-01-22 | End: 2020-01-23

## 2020-01-22 RX ORDER — ONDANSETRON 2 MG/ML
4 INJECTION INTRAMUSCULAR; INTRAVENOUS EVERY 6 HOURS PRN
Status: DISCONTINUED | OUTPATIENT
Start: 2020-01-22 | End: 2020-02-04

## 2020-01-22 RX ORDER — LIDOCAINE HYDROCHLORIDE 20 MG/ML
INJECTION, SOLUTION EPIDURAL; INFILTRATION; INTRACAUDAL; PERINEURAL AS NEEDED
Status: DISCONTINUED | OUTPATIENT
Start: 2020-01-22 | End: 2020-01-22 | Stop reason: SURG

## 2020-01-22 RX ORDER — ACETAMINOPHEN 325 MG/1
650 TABLET ORAL EVERY 4 HOURS PRN
Status: CANCELLED | OUTPATIENT
Start: 2020-01-22

## 2020-01-22 RX ORDER — LORAZEPAM 2 MG/ML
0.25 INJECTION INTRAMUSCULAR EVERY 4 HOURS PRN
Status: DISCONTINUED | OUTPATIENT
Start: 2020-01-22 | End: 2020-01-22

## 2020-01-22 RX ADMIN — POTASSIUM CHLORIDE AND SODIUM CHLORIDE 75 ML/HR: 900; 150 INJECTION, SOLUTION INTRAVENOUS at 00:38

## 2020-01-22 RX ADMIN — POTASSIUM CHLORIDE AND SODIUM CHLORIDE 75 ML/HR: 900; 150 INJECTION, SOLUTION INTRAVENOUS at 11:31

## 2020-01-22 RX ADMIN — IPRATROPIUM BROMIDE AND ALBUTEROL SULFATE 3 ML: 2.5; .5 SOLUTION RESPIRATORY (INHALATION) at 22:48

## 2020-01-22 RX ADMIN — HEPARIN SODIUM 5000 UNITS: 5000 INJECTION INTRAVENOUS; SUBCUTANEOUS at 23:29

## 2020-01-22 RX ADMIN — LEVALBUTEROL HYDROCHLORIDE 1.25 MG: 1.25 SOLUTION, CONCENTRATE RESPIRATORY (INHALATION) at 13:24

## 2020-01-22 RX ADMIN — PIPERACILLIN SODIUM AND TAZOBACTAM SODIUM 3.38 G: 3; .375 INJECTION, POWDER, LYOPHILIZED, FOR SOLUTION INTRAVENOUS at 14:50

## 2020-01-22 RX ADMIN — METOPROLOL TARTRATE 2.5 MG: 5 INJECTION INTRAVENOUS at 21:52

## 2020-01-22 RX ADMIN — PANTOPRAZOLE SODIUM 40 MG: 40 INJECTION, POWDER, FOR SOLUTION INTRAVENOUS at 11:23

## 2020-01-22 RX ADMIN — ISODIUM CHLORIDE 3 ML: 0.03 SOLUTION RESPIRATORY (INHALATION) at 13:24

## 2020-01-22 RX ADMIN — SODIUM CHLORIDE, SODIUM GLUCONATE, SODIUM ACETATE, POTASSIUM CHLORIDE, MAGNESIUM CHLORIDE, SODIUM PHOSPHATE, DIBASIC, AND POTASSIUM PHOSPHATE 1000 ML: .53; .5; .37; .037; .03; .012; .00082 INJECTION, SOLUTION INTRAVENOUS at 21:45

## 2020-01-22 RX ADMIN — PROPOFOL 30 MG: 10 INJECTION, EMULSION INTRAVENOUS at 09:00

## 2020-01-22 RX ADMIN — BACITRACIN, NEOMYCIN, POLYMYXIN B 1 SMALL APPLICATION: 400; 3.5; 5 OINTMENT TOPICAL at 11:23

## 2020-01-22 RX ADMIN — FLUTICASONE FUROATE AND VILANTEROL TRIFENATATE 1 PUFF: 100; 25 POWDER RESPIRATORY (INHALATION) at 11:21

## 2020-01-22 RX ADMIN — ALBUTEROL SULFATE 2 PUFF: 90 AEROSOL, METERED RESPIRATORY (INHALATION) at 11:19

## 2020-01-22 RX ADMIN — PANTOPRAZOLE SODIUM 40 MG: 40 INJECTION, POWDER, FOR SOLUTION INTRAVENOUS at 21:44

## 2020-01-22 RX ADMIN — SODIUM CHLORIDE, SODIUM GLUCONATE, SODIUM ACETATE, POTASSIUM CHLORIDE, MAGNESIUM CHLORIDE, SODIUM PHOSPHATE, DIBASIC, AND POTASSIUM PHOSPHATE 125 ML/HR: .53; .5; .37; .037; .03; .012; .00082 INJECTION, SOLUTION INTRAVENOUS at 21:45

## 2020-01-22 RX ADMIN — DEXTRAN 70 AND HYPROMELLOSE 2910 1 DROP: 1; 3 SOLUTION/ DROPS OPHTHALMIC at 11:23

## 2020-01-22 RX ADMIN — ISODIUM CHLORIDE 3 ML: 0.03 SOLUTION RESPIRATORY (INHALATION) at 07:21

## 2020-01-22 RX ADMIN — LIDOCAINE HYDROCHLORIDE 100 MG: 20 INJECTION, SOLUTION EPIDURAL; INFILTRATION; INTRACAUDAL; PERINEURAL at 08:54

## 2020-01-22 RX ADMIN — SODIUM CHLORIDE, SODIUM LACTATE, POTASSIUM CHLORIDE, AND CALCIUM CHLORIDE: .6; .31; .03; .02 INJECTION, SOLUTION INTRAVENOUS at 08:41

## 2020-01-22 RX ADMIN — LEVALBUTEROL HYDROCHLORIDE 1.25 MG: 1.25 SOLUTION, CONCENTRATE RESPIRATORY (INHALATION) at 07:21

## 2020-01-22 RX ADMIN — PROPOFOL 70 MG: 10 INJECTION, EMULSION INTRAVENOUS at 08:55

## 2020-01-22 NOTE — ANESTHESIA POSTPROCEDURE EVALUATION
Post-Op Assessment Note    CV Status:  Stable  Pain Score: 0    Pain management: adequate     Mental Status:  Alert   PONV Controlled:  None   Airway Patency:  Patent   Post Op Vitals Reviewed: Yes      Staff: Anesthesiologist   Comments: patient coughing stomach was full during procedure          /71 (01/22/20 0911)    Temp 98 5 °F (36 9 °C) (01/22/20 0911)    Pulse 101 (01/22/20 0911)   Resp 20 (01/22/20 0911)    SpO2 94

## 2020-01-22 NOTE — PROGRESS NOTES
01/21/20 2200   Gastrointestinal   Gastrointestinal (WDL) X   Abdomen Inspection Rounded;Nondistended;Obese; Soft   Bowel Sounds (All Quadrants) Normoactive   Tenderness No guarding;Nontender; Soft   Last BM Date 01/21/20   GI Symptoms Bloating

## 2020-01-22 NOTE — CONSULTS
Consultation - General Surgery   Ida Stroud 82 y o  female MRN: [id number]  Unit/Bed#: -01 Encounter: [id number]    Assessment/Plan     Assessment:  The patient is an 77-year-old woman in poor health presenting with abdominal and epigastric abdominal pain nausea and vomiting  Her admission CT scan demonstrated a dilated stomach and fluid in the esophagus  The patient went for an EGD today, is found to have feculent material in her esophagus, the stomach was never visualized due to the impacted material     A nasogastric tube was placed, which apparently is only in the esophagus  And has been irrigated with a L of saline, returns continued to be significant for debris  In addition to epigastric pain the patient complains of chills and rigors  Plan:  The patient will need repeat endoscopy for diagnosis, her current complaints of chills and rigor are concerning for the development of sepsis  Unfortunately, the patient does not appear to be a surgical candidate, and endoscopic stenting may be the more appropriate option  I would recommend placing the patient on broad-spectrum antibiotics, and continuing nasogastric decompression, and transferring the patient to a higher level of care for more intensive monitoring, and for consultation with a provider who may be able to provide endoscopic stenting as appropriate  History of Present Illness     HPI:  Ida Stroud is a 82 y o  female who presents with nausea, vomiting and abdominal pain  She has subsequently developed chills and rigors  She had an aborted EGD today which revealed impacted feculent contents in her esophagus, the stomach never being able to be identified  Her admission CT angiogram demonstrates a dilated debris-filled stomach, but does not define the cause of obstruction  Inpatient consult to Acute Care Surgery  Consult performed by:  Junito Sanchez MD  Consult ordered by: Fanta Ramey MD          Review of Systems   Constitutional: Negative for unexpected weight change  HENT: Positive for trouble swallowing  Respiratory: Positive for cough and shortness of breath  Cardiovascular: Positive for chest pain (Left chest and substernal)  Gastrointestinal: Positive for abdominal distention and abdominal pain  Genitourinary: Negative for difficulty urinating  Psychiatric/Behavioral: The patient is nervous/anxious  Historical Information   Past Medical History:   Diagnosis Date    Asthma     Atrial fibrillation     Blurred vision     Cardiac disease     Colitis     COPD (chronic obstructive pulmonary disease)     Diverticulosis     DJD (degenerative joint disease)     Emphysema lung     Gait abnormality      Past Surgical History:   Procedure Laterality Date    BLADDER SURGERY      COLON SURGERY      COLONOSCOPY W/ POLYPECTOMY N/A 2019    Procedure: COLONOSCOPY W/ POLYPECTOMY;  Surgeon: Dominic Caballero MD;  Location: [address] [place] [address] GI LAB;   Service: General    SMALL INTESTINE SURGERY       Social History   Social History     Substance and Sexual Activity   Alcohol Use Not Currently    Frequency: Never    Binge frequency: Never     Social History     Substance and Sexual Activity   Drug Use No     Social History     Tobacco Use   Smoking Status Former Smoker    Last attempt to quit: 2013    Years since quittin 1   Smokeless Tobacco Never Used     Family History:   Family History   Problem Relation Age of Onset    Heart disease Mother        Meds/Allergies   current meds:   Current Facility-Administered Medications   Medication Dose Route Frequency    acetaminophen (TYLENOL) tablet 650 mg  650 mg Oral Q4H PRN    albuterol (PROVENTIL HFA,VENTOLIN HFA) inhaler 2 puff  2 puff Inhalation Q6H PRN    dextran 70-hypromellose (GENTEAL TEARS) 0 1-0 3 % ophthalmic solution 1 drop  1 drop Both Eyes TID    fluticasone-vilanterol (BREO ELLIPTA) 100-25 mcg/inh inhaler 1 puff  1 puff Inhalation Daily    levalbuterol (XOPENEX) inhalation solution 1 25 mg  1 25 mg Nebulization TID    And    sodium chloride 0 9 % inhalation solution 3 mL  3 mL Nebulization TID    LORazepam (ATIVAN) 2 mg/mL injection 0 25 mg  0 25 mg Intravenous Q4H PRN    morphine injection 2 mg  2 mg Intravenous Q6H PRN    neomycin-bacitracin-polymyxin b (NEOSPORIN) ointment 1 small application  1 small application Topical Daily    ondansetron (ZOFRAN) injection 4 mg  4 mg Intravenous Q6H PRN    pantoprazole (PROTONIX) injection 40 mg  40 mg Intravenous Q12H [facility]    piperacillin-tazobactam (ZOSYN) 3 375 g in sodium chloride 0 9 % 100 mL IVPB  3 375 g Intravenous Q6H    saliva substitute (MOUTH KOTE) mucosal solution 5 spray  5 spray Mouth/Throat 4x Daily PRN    sodium chloride 0 9 % with KCl 20 mEq/L infusion (premix)  75 mL/hr Intravenous Continuous    and PTA meds:   Prior to Admission Medications   Prescriptions Last Dose Informant Patient Reported? Taking?    ALPRAZolam (XANAX) 0 5 mg tablet 1/20/2020 at Unknown time  Yes Yes   Sig: Take 0 5 mg by mouth daily at bedtime as needed   Calcium Carb-Cholecalciferol (CALCIUM 1000 + D PO) 1/20/2020 at Unknown time  Yes Yes   Sig: Take 1,000 mg by mouth daily   albuterol (PROVENTIL HFA,VENTOLIN HFA) 90 mcg/act inhaler 1/20/2020 at Unknown time  No Yes   Sig: Inhale 2 puffs every 6 (six) hours as needed for wheezing or shortness of breath   alendronate (FOSAMAX) 70 mg tablet Past Week at Unknown time  Yes Yes   Sig: Take by mouth every 7 days    apixaban (ELIQUIS) 5 mg 1/20/2020 at Unknown time  No Yes   Sig: Take 1 tablet (5 mg total) by mouth 2 (two) times a day   cholestyramine sugar free (QUESTRAN LIGHT) 4 g packet 1/20/2020 at Unknown time  No Yes   Sig: Take 1 packet (4 g total) by mouth 2 (two) times a day   diltiazem (CARDIZEM CD) 240 mg 24 hr capsule 1/20/2020 at Unknown time  No Yes   Sig: Take 1 capsule (240 mg total) by mouth daily   fluticasone-umeclidinium-vilanterol (TRELEGY ELLIPTA) 100-62 5-25 MCG/INH inhaler 2020 at Unknown time  Yes Yes   Si puff daily   ipratropium-albuterol (DUO-NEB) 0 5-2 5 mg/3 mL nebulizer solution Past Week at Unknown time  No Yes   Sig: Take 1 vial (3 mL total) by nebulization every 6 (six) hours while awake   iron polysaccharides (FERREX) 150 mg capsule 2020 at Unknown time  No Yes   Sig: Take 1 capsule (150 mg total) by mouth 2 (two) times a day   loperamide (IMODIUM) 2 mg capsule Past Week at Unknown time  No Yes   Sig: Take 1 capsule (2 mg total) by mouth 3 (three) times a day as needed for diarrhea   metoprolol tartrate (LOPRESSOR) 25 mg tablet 2020 at Unknown time  No Yes   Sig: Take 0 5 tablets (12 5 mg total) by mouth every 12 (twelve) hours   montelukast (SINGULAIR) 10 mg tablet 2020 at Unknown time  No Yes   Sig: Take 1 tablet (10 mg total) by mouth daily at bedtime   pantoprazole (PROTONIX) 40 mg tablet 2020 at Unknown time  No Yes   Sig: Take 1 tablet (40 mg total) by mouth daily in the early morning   potassium chloride (MICRO-K) 10 MEQ CR capsule 2020 at Unknown time  No Yes   Sig: Take 1 capsule (10 mEq total) by mouth daily   psyllium (METAMUCIL) packet 2020 at Unknown time  No Yes   Sig: Take 1 packet by mouth 3 (three) times a day      Facility-Administered Medications: None     Allergies   Allergen Reactions    Fluticasone        Objective   First Vitals:   Blood Pressure: 138/82 (20)  Pulse: 99 (20)  Temperature: 98 7 °F (37 1 °C) (20)  Temp Source: Temporal (20)  Respirations: (!) 24 (20)  Height: 5' (152 4 cm) (20)  Weight - Scale: 66 7 kg (147 lb) (20)  SpO2: 95 % (20)    Current Vitals:   Blood Pressure: 112/76 (20 1022)  Pulse: 99 (20 1022)  Temperature: 99 4 °F (37 4 °C) (20 1149)  Temp Source: Tympanic (20 1022)  Respirations: 18 (20 1022)  Height: 5' (152 4 cm) (01/21/20 0732)  Weight - Scale: 68 9 kg (151 lb 14 4 oz) (01/21/20 0732)  SpO2: 90 % (01/22/20 1022)      Intake/Output Summary (Last 24 hours) at 1/22/2020 1254  Last data filed at 1/22/2020 0038  Gross per 24 hour   Intake 1500 ml   Output 550 ml   Net 950 ml       Invasive Devices     Peripheral Intravenous Line            Peripheral IV 01/21/20 Left Forearm less than 1 day          Drain            NG/OG/Enteral Tube Nasogastric 16 Fr Right nares 1 day                Physical Exam   Constitutional:   Obese white female, appears chronically ill and some discomfort complaining of chest pain chills and rigor   HENT:   Head: Normocephalic and atraumatic  Eyes: No scleral icterus  Neck: No tracheal deviation present  Cardiovascular: Normal rate and normal heart sounds  Pulmonary/Chest: No respiratory distress  She has wheezes  Abdominal: Bowel sounds are normal  She exhibits distension  Tympanic without guarding or rebound  Appreciable mass or organomegaly  Genitourinary:   Genitourinary Comments: Deferred   Musculoskeletal:   Without cyanosis, clubbing or edema   Neurological: She is alert  Skin: Skin is warm and dry  Psychiatric:   Apprehensive       Lab Results:   I have personally reviewed pertinent lab results  , CBC:   Lab Results   Component Value Date    WBC 6 40 01/22/2020    HGB 12 3 01/22/2020    HCT 39 7 (L) 01/22/2020    MCV 85 01/22/2020     01/22/2020    MCH 26 4 01/22/2020    MCHC 31 0 01/22/2020    RDW 16 6 (H) 01/22/2020    MPV 8 0 (L) 01/22/2020   , CMP:   Lab Results   Component Value Date    SODIUM 135 01/22/2020    K 4 1 01/22/2020     01/22/2020    CO2 25 01/22/2020    BUN 7 01/22/2020    CREATININE 0 48 (L) 01/22/2020    CALCIUM 8 9 01/22/2020    AST 21 01/22/2020    ALT 26 01/22/2020    ALKPHOS 73 01/22/2020    EGFR 92 01/22/2020   , Lipase: No results found for: LIPASE  Imaging: I have personally reviewed pertinent reports     and I have personally reviewed pertinent films in PACS  EKG, Pathology, and Other Studies: I have personally reviewed pertinent reports  Counseling / Coordination of Care  Total floor / unit time spent today 45 minutes  Greater than 50% of total time was spent with the patient and / or family counseling and / or coordination of care  A description of the counseling / coordination of care: Including discussion with Dr Grant Burt  I recommend transfer to a higher level of care

## 2020-01-22 NOTE — NURSING NOTE
EDG completed this AM, patient back in room, NG tube connected to low intermittent wall suction  Safety maintained, bed remains in lowest position, with 2 rails up and breaks on,call bell with side table and personal items with in reach  Louis Burt in to see patient and consulted : Tylor Martínze Surgeon  : Tylor Martínez in to see patient, : Sandra Aguayo agrees with Gi that patient needs to be transferred to surgical intensive care and discussed with Louis Burt in person

## 2020-01-22 NOTE — PROGRESS NOTES
Consult - [address] [facility] [address] 82 y o  female MRN: [id number]  Unit/Bed#: -01 Encounter: [id number]      -------------------------------------------------------------------------------------------------------------  Chief Complaint: Nausea, vomiting, abdominal pain    History of Present Illness   HX and PE limited by:   Temitope Finch is a 82 y o  female who presents to [facility]  emergency department on 1/21,  complaining of abdominal pain, nausea, vomiting, and chest pain  She was workuped up and found to have gastric outlet obstruction on CT scan of the abdomen  Patient was admitted to the medical-surgical floor under telemetry  Patient received unsuccessful EGD by Dr Mary Westfall as he was unable to reach the pylorus due to large amount of solid food and fluid in the stomach body  He recommended the patient to be transferred for a possible surgery  Patient was seen by Dr Bonnee Opitz who preformed an EGD on 1/22  EGD revealed a gastric outlet obstruction with large amount of food present in the antrum as well as the upper gastric body and fundus  Dr Mary Westfall tried to aspirate, but feels that patient has much more than just gastric outlet obstruction  With patient's comorbidities, he recommended the patient to be transferred to [address] [place] [address]  The patient was accepted by Dr Efren Guillermo and started on IV Zosyn for suspicion for possible perforation/sepsis  History obtained from chart review    -------------------------------------------------------------------------------------------------------------  Assessment and Plan:    Neuro:    Analgesia  o Morphine 2mg IV q6   Anxiety  o Ativan 0 25 IV q4   Monitor CAM ICU daily   Regulate Sleep/Wake       CV:    Diagnosis: Atrial Fibrillation, HLD  o Plan:   o Hold Eliquis for surgical procedure    o Hold home Cardizem CD 240mg, lopressor, and Cholestyramine 4g packet    Pulm:   Diagnosis: Emphysema, COPD, Asthma  o Plan:   - Continue Proventil HFA 2 puffs q6 PRN,   - To substitute for home Trellegy  Start Breo 1 puff daily with Spiriva   - Hold home singular      GI:    Gastric Obstruction  o Continue NGT to   o Continue NPO until Surgical team assesses   Diagnosis: GERD  o Plan:   - Continue home Protonix IV 40mg q12      :   o No acute issues  o Creat 0 48/BUN 7      F/E/N:    Plan:   o NPO until surgery assesses  o Continue to monitor electrolytes and replace as needed    o Isolyte @ 77      Heme/Onc:    No acute issues      Endo:    No acute issues   Monitor BS q6 while NPO      ID:    Diagnosis: Suspicion for possible perforation/sepsis  o Plan: Zosyn 3 375g q6  o Continue to monitor fever and WBCs   Diagnosis:   o Plan:       MSK/Skin:    Diagnosis: LLE Laceration  o Plan:   - Continue applying neosporin   Assist with turning and repositioning q2      Disposition:   Code Status: Level 1 - Full Code  --------------------------------------------------------------------------------------------------------------  Review of Systems        Physical Exam  --------------------------------------------------------------------------------------------------------------  Vitals:   Vitals:    01/22/20 0930 01/22/20 1022 01/22/20 1149 01/22/20 1326   BP: 148/79 112/76     BP Location:  Right arm     Pulse: (!) 109 99     Resp: 22 18     Temp:  99 °F (37 2 °C) 99 4 °F (37 4 °C)    TempSrc:  Tympanic     SpO2: 93% 90%  90%   Weight:       Height:         Temp  Min: 96 9 °F (36 1 °C)  Max: 99 6 °F (37 6 °C)  IBW: 45 5 kg  Height: 5' (152 4 cm)  Body mass index is 29 67 kg/m²        Laboratory and Diagnostics:  Results from last 7 days   Lab Units 01/22/20  0502 01/21/20  0156   WBC Thousand/uL 6 40 8 20   HEMOGLOBIN g/dL 12 3 13 0   HEMATOCRIT % 39 7* 40 8*   PLATELETS Thousands/uL 199 232   NEUTROS PCT % 72 79*   MONOS PCT % 11 9     Results from last 7 days   Lab Units 01/22/20  0502 01/21/20  0156   SODIUM mmol/L 135 133*   POTASSIUM mmol/L 4 1 3 4*   CHLORIDE mmol/L 101 99   CO2 mmol/L 25 28   ANION GAP mmol/L 9 6   BUN mg/dL 7 12   CREATININE mg/dL 0 48* 0 50*   CALCIUM mg/dL 8 9 9 3   GLUCOSE RANDOM mg/dL 99 147*   ALT U/L 26 26   AST U/L 21 16   ALK PHOS U/L 73 81   ALBUMIN g/dL 3 9 4 1   TOTAL BILIRUBIN mg/dL 0 30 0 20     Results from last 7 days   Lab Units 01/21/20  0156   MAGNESIUM mg/dL 2 5           Results from last 7 days   Lab Units 01/21/20  0156   TROPONIN I ng/mL <0 03         ABG:    VBG:          Micro:        EKG: Reviewed  Imaging: I have personally reviewed pertinent reports  1/21: CTA Abd/pelvis: The stomach is distended with fluid and food debris  The duodenum and remainder of the small bowel is normal in caliber  Gastric outlet obstruction is not excluded  The esophagus is distended with fluid, consider aspiration precautions  1/22: EGD: Gastric outlet obstruction with large amount of food present in the antrum as well as the upper gastric body and fundus  An NG tube was seen which had been placed placed that had aspirated all liquid from the stomach  Gastric mucosa revealed a mild to  moderate gastritis  Esophageal mucosa revealed a mild to moderate reflux esophagitis  The scope could not be passed to the pylorus so it was unclear what was obstructing the stomach  Historical Information   Past Medical History:   Diagnosis Date    Asthma     Atrial fibrillation     Blurred vision     Cardiac disease     Colitis     COPD (chronic obstructive pulmonary disease)     Diverticulosis     DJD (degenerative joint disease)     Emphysema lung     Gait abnormality      Past Surgical History:   Procedure Laterality Date    BLADDER SURGERY      COLON SURGERY      COLONOSCOPY W/ POLYPECTOMY N/A 1/21/2019    Procedure: COLONOSCOPY W/ POLYPECTOMY;  Surgeon: Brianna Solorio MD;  Location: [address] [place] [address] GI LAB;   Service: General    SMALL INTESTINE SURGERY       Social History   Social History Substance and Sexual Activity   Alcohol Use Not Currently    Frequency: Never    Binge frequency: Never     Social History     Substance and Sexual Activity   Drug Use No     Social History     Tobacco Use   Smoking Status Former Smoker    Last attempt to quit: 2013    Years since quittin 1   Smokeless Tobacco Never Used     Exercise History: ly History:   Family History   Problem Relation Age of Onset    Heart disease Mother      I have reviewed this patient's family history and commented on sigificant items within the HPI      Medications:  Current Facility-Administered Medications   Medication Dose Route Frequency    acetaminophen (TYLENOL) tablet 650 mg  650 mg Oral Q4H PRN    albuterol (PROVENTIL HFA,VENTOLIN HFA) inhaler 2 puff  2 puff Inhalation Q6H PRN    dextran 70-hypromellose (GENTEAL TEARS) 0 1-0 3 % ophthalmic solution 1 drop  1 drop Both Eyes TID    fluticasone-vilanterol (BREO ELLIPTA) 100-25 mcg/inh inhaler 1 puff  1 puff Inhalation Daily    levalbuterol (XOPENEX) inhalation solution 1 25 mg  1 25 mg Nebulization TID    And    sodium chloride 0 9 % inhalation solution 3 mL  3 mL Nebulization TID    LORazepam (ATIVAN) 2 mg/mL injection 0 25 mg  0 25 mg Intravenous Q4H PRN    morphine injection 2 mg  2 mg Intravenous Q6H PRN    neomycin-bacitracin-polymyxin b (NEOSPORIN) ointment 1 small application  1 small application Topical Daily    ondansetron (ZOFRAN) injection 4 mg  4 mg Intravenous Q6H PRN    pantoprazole (PROTONIX) injection 40 mg  40 mg Intravenous Q12H [address]    piperacillin-tazobactam (ZOSYN) 3 375 g in sodium chloride 0 9 % 100 mL IVPB  3 375 g Intravenous Q6H    saliva substitute (MOUTH KOTE) mucosal solution 5 spray  5 spray Mouth/Throat 4x Daily PRN    sodium chloride 0 9 % with KCl 20 mEq/L infusion (premix)  75 mL/hr Intravenous Continuous     Home medications:  Prior to Admission Medications   Prescriptions Last Dose Informant Patient Reported? Taking? ALPRAZolam (XANAX) 0 5 mg tablet 2020 at Unknown time  Yes Yes   Sig: Take 0 5 mg by mouth daily at bedtime as needed   Calcium Carb-Cholecalciferol (CALCIUM 1000 + D PO) 2020 at Unknown time  Yes Yes   Sig: Take 1,000 mg by mouth daily   albuterol (PROVENTIL HFA,VENTOLIN HFA) 90 mcg/act inhaler 2020 at Unknown time  No Yes   Sig: Inhale 2 puffs every 6 (six) hours as needed for wheezing or shortness of breath   alendronate (FOSAMAX) 70 mg tablet Past Week at Unknown time  Yes Yes   Sig: Take by mouth every 7 days    apixaban (ELIQUIS) 5 mg 2020 at Unknown time  No Yes   Sig: Take 1 tablet (5 mg total) by mouth 2 (two) times a day   cholestyramine sugar free (QUESTRAN LIGHT) 4 g packet 2020 at Unknown time  No Yes   Sig: Take 1 packet (4 g total) by mouth 2 (two) times a day   diltiazem (CARDIZEM CD) 240 mg 24 hr capsule 2020 at Unknown time  No Yes   Sig: Take 1 capsule (240 mg total) by mouth daily   fluticasone-umeclidinium-vilanterol (TRELEGY ELLIPTA) 100-62 5-25 MCG/INH inhaler 2020 at Unknown time  Yes Yes   Si puff daily   ipratropium-albuterol (DUO-NEB) 0 5-2 5 mg/3 mL nebulizer solution Past Week at Unknown time  No Yes   Sig: Take 1 vial (3 mL total) by nebulization every 6 (six) hours while awake   iron polysaccharides (FERREX) 150 mg capsule 2020 at Unknown time  No Yes   Sig: Take 1 capsule (150 mg total) by mouth 2 (two) times a day   loperamide (IMODIUM) 2 mg capsule Past Week at Unknown time  No Yes   Sig: Take 1 capsule (2 mg total) by mouth 3 (three) times a day as needed for diarrhea   metoprolol tartrate (LOPRESSOR) 25 mg tablet 2020 at Unknown time  No Yes   Sig: Take 0 5 tablets (12 5 mg total) by mouth every 12 (twelve) hours   montelukast (SINGULAIR) 10 mg tablet 2020 at Unknown time  No Yes   Sig: Take 1 tablet (10 mg total) by mouth daily at bedtime   pantoprazole (PROTONIX) 40 mg tablet 2020 at Unknown time  No Yes   Sig: Take 1 tablet (40 mg total) by mouth daily in the early morning   potassium chloride (MICRO-K) 10 MEQ CR capsule 1/20/2020 at Unknown time  No Yes   Sig: Take 1 capsule (10 mEq total) by mouth daily   psyllium (METAMUCIL) packet 1/20/2020 at Unknown time  No Yes   Sig: Take 1 packet by mouth 3 (three) times a day      Facility-Administered Medications: None     Allergies: Allergies   Allergen Reactions    Fluticasone      ------------------------------------------------------------------------------------------------------------  Advance Directive and Living Will: Yes    Power of :    POLST:    ------------------------------------------------------------------------------------------------------------  Anticipated Length of Stay is > 2 midnights    Counseling / Coordination of Care  Total Critical Care time spent 40 minutes excluding procedures, teaching and family updates  LEEROY Kaur        Portions of the record may have been created with voice recognition software  Occasional wrong word or "sound a like" substitutions may have occurred due to the inherent limitations of voice recognition software    Read the chart carefully and recognize, using context, where substitutions have occurred

## 2020-01-22 NOTE — PLAN OF CARE
Problem: Potential for Falls  Goal: Patient will remain free of falls  Description  INTERVENTIONS:  - Assess patient frequently for physical needs  -  Identify cognitive and physical deficits and behaviors that affect risk of falls    -  [place] fall precautions as indicated by assessment   - Educate patient/family on patient safety including physical limitations  - Instruct patient to call for assistance with activity based on assessment  - Modify environment to reduce risk of injury  - Consider OT/PT consult to assist with strengthening/mobility  Outcome: Progressing     Problem: Prexisting or High Potential for Compromised Skin Integrity  Goal: Skin integrity is maintained or improved  Description  INTERVENTIONS:  - Identify patients at risk for skin breakdown  - Assess and monitor skin integrity  - Assess and monitor nutrition and hydration status  - Monitor labs   - Assess for incontinence   - Turn and reposition patient  - Assist with mobility/ambulation  - Relieve pressure over bony prominences  - Avoid friction and shearing  - Provide appropriate hygiene as needed including keeping skin clean and dry  - Evaluate need for skin moisturizer/barrier cream  - Collaborate with interdisciplinary team   - Patient/family teaching  - Consider wound care consult   Outcome: Progressing     Problem: RESPIRATORY - ADULT  Goal: Achieves optimal ventilation and oxygenation  Description  INTERVENTIONS:  - Assess for changes in respiratory status  - Assess for changes in mentation and behavior  - Position to facilitate oxygenation and minimize respiratory effort  - Oxygen administered by appropriate delivery if ordered  - Initiate smoking cessation education as indicated  - Encourage broncho-pulmonary hygiene including cough, deep breathe, Incentive Spirometry  - Assess the need for suctioning and aspirate as needed  - Assess and instruct to report SOB or any respiratory difficulty  - Respiratory Therapy support as indicated  Outcome: Progressing     Problem: GASTROINTESTINAL - ADULT  Goal: Minimal or absence of nausea and/or vomiting  Description  INTERVENTIONS:  - Administer IV fluids if ordered to ensure adequate hydration  - Maintain NPO status until nausea and vomiting are resolved  - Nasogastric tube if ordered  - Administer ordered antiemetic medications as needed  - Provide nonpharmacologic comfort measures as appropriate  - Advance diet as tolerated, if ordered  - Consider nutrition services referral to assist patient with adequate nutrition and appropriate food choices  Outcome: Progressing  Goal: Maintains or returns to baseline bowel function  Description  INTERVENTIONS:  - Assess bowel function  - Encourage oral fluids to ensure adequate hydration  - Administer IV fluids if ordered to ensure adequate hydration  - Administer ordered medications as needed  - Encourage mobilization and activity  - Consider nutritional services referral to assist patient with adequate nutrition and appropriate food choices  Outcome: Progressing  Goal: Maintains adequate nutritional intake  Description  INTERVENTIONS:  - Monitor percentage of each meal consumed  - Identify factors contributing to decreased intake, treat as appropriate  - Assist with meals as needed  - Monitor I&O, weight, and lab values if indicated  - Obtain nutrition services referral as needed  Outcome: Progressing  Goal: Establish and maintain optimal ostomy function  Description  INTERVENTIONS:  - Assess bowel function  - Encourage oral fluids to ensure adequate hydration  - Administer IV fluids if ordered to ensure adequate hydration   - Administer ordered medications as needed  - Encourage mobilization and activity  - Nutrition services referral to assist patient with appropriate food choices  - Assess stoma site  - Consider wound care consult   Outcome: Progressing     Problem: METABOLIC, FLUID AND ELECTROLYTES - ADULT  Goal: Electrolytes maintained within normal limits  Description  INTERVENTIONS:  - Monitor labs and assess patient for signs and symptoms of electrolyte imbalances  - Administer electrolyte replacement as ordered  - Monitor response to electrolyte replacements, including repeat lab results as appropriate  - Instruct patient on fluid and nutrition as appropriate  Outcome: Progressing  Goal: Fluid balance maintained  Description  INTERVENTIONS:  - Monitor labs   - Monitor I/O and WT  - Instruct patient on fluid and nutrition as appropriate  - Assess for signs & symptoms of volume excess or deficit  Outcome: Progressing  Goal: Glucose maintained within target range  Description  INTERVENTIONS:  - Monitor Blood Glucose as ordered  - Assess for signs and symptoms of hyperglycemia and hypoglycemia  - Administer ordered medications to maintain glucose within target range  - Assess nutritional intake and initiate nutrition service referral as needed  Outcome: Progressing     Problem: SKIN/TISSUE INTEGRITY - ADULT  Goal: Skin integrity remains intact  Description  INTERVENTIONS  - Identify patients at risk for skin breakdown  - Assess and monitor skin integrity  - Assess and monitor nutrition and hydration status  - Monitor labs (i e  albumin)  - Assess for incontinence   - Turn and reposition patient  - Assist with mobility/ambulation  - Relieve pressure over bony prominences  - Avoid friction and shearing  - Provide appropriate hygiene as needed including keeping skin clean and dry  - Evaluate need for skin moisturizer/barrier cream  - Collaborate with interdisciplinary team (i e  Nutrition, Rehabilitation, etc )   - Patient/family teaching  Outcome: Progressing  Goal: Incision(s), wounds(s) or drain site(s) healing without S/S of infection  Description  INTERVENTIONS  - Assess and document risk factors for skin impairment   - Assess and document dressing, incision, wound bed, drain sites and surrounding tissue  - Consider nutrition services referral as needed  - Oral mucous membranes remain intact  - Provide patient/ family education  Outcome: Progressing  Goal: Oral mucous membranes remain intact  Description  INTERVENTIONS  - Assess oral mucosa and hygiene practices  - Implement preventative oral hygiene regimen  - Implement oral medicated treatments as ordered  - Initiate Nutrition services referral as needed  Outcome: Progressing     Problem: PAIN - ADULT  Goal: Verbalizes/displays adequate comfort level or baseline comfort level  Description  Interventions:  - Encourage patient to monitor pain and request assistance  - Assess pain using appropriate pain scale  - Administer analgesics based on type and severity of pain and evaluate response  - Implement non-pharmacological measures as appropriate and evaluate response  - Consider cultural and social influences on pain and pain management  - Notify physician/advanced practitioner if interventions unsuccessful or patient reports new pain  Outcome: Progressing     Problem: INFECTION - ADULT  Goal: Absence or prevention of progression during hospitalization  Description  INTERVENTIONS:  - Assess and monitor for signs and symptoms of infection  - Monitor lab/diagnostic results  - Monitor all insertion sites, i e  indwelling lines, tubes, and drains  - Monitor endotracheal if appropriate and nasal secretions for changes in amount and color  - [place] appropriate cooling/warming therapies per order  - Administer medications as ordered  - Instruct and encourage patient and family to use good hand hygiene technique  - Identify and instruct in appropriate isolation precautions for identified infection/condition  Outcome: Progressing  Goal: Absence of fever/infection during neutropenic period  Description  INTERVENTIONS:  - Monitor WBC    Outcome: Progressing     Problem: SAFETY ADULT  Goal: Maintain or return to baseline ADL function  Description  INTERVENTIONS:  -  Assess patient's ability to carry out ADLs; assess patient's baseline for ADL function and identify physical deficits which impact ability to perform ADLs (bathing, care of mouth/teeth, toileting, grooming, dressing, etc )  - Assess/evaluate cause of self-care deficits   - Assess range of motion  - Assess patient's mobility; develop plan if impaired  - Assess patient's need for assistive devices and provide as appropriate  - Encourage maximum independence but intervene and supervise when necessary  - Involve family in performance of ADLs  - Assess for home care needs following discharge   - Consider OT consult to assist with ADL evaluation and planning for discharge  - Provide patient education as appropriate  Outcome: Progressing  Goal: Maintain or return mobility status to optimal level  Description  INTERVENTIONS:  - Assess patient's baseline mobility status (ambulation, transfers, stairs, etc )    - Identify cognitive and physical deficits and behaviors that affect mobility  - Identify mobility aids required to assist with transfers and/or ambulation (gait belt, sit-to-stand, lift, walker, cane, etc )  - [place] fall precautions as indicated by assessment  - Record patient progress and toleration of activity level on Mobility SBAR; progress patient to next Phase/Stage  - Instruct patient to call for assistance with activity based on assessment  - Consider rehabilitation consult to assist with strengthening/weightbearing, etc   Outcome: Progressing     Problem: DISCHARGE PLANNING  Goal: Discharge to home or other facility with appropriate resources  Description  INTERVENTIONS:  - Identify barriers to discharge w/patient and caregiver  - Arrange for needed discharge resources and transportation as appropriate  - Identify discharge learning needs (meds, wound care, etc )  - Arrange for interpretive services to assist at discharge as needed  - Refer to Case Management Department for coordinating discharge planning if the patient needs post-hospital services based on physician/advanced practitioner order or complex needs related to functional status, cognitive ability, or social support system  Outcome: Progressing     Problem: Knowledge Deficit  Goal: Patient/family/caregiver demonstrates understanding of disease process, treatment plan, medications, and discharge instructions  Description  Complete learning assessment and assess knowledge base    Interventions:  - Provide teaching at level of understanding  - Provide teaching via preferred learning methods  Outcome: Progressing

## 2020-01-22 NOTE — NURSING NOTE
Patient has a Laceration to LLE, wound cleansed with NS, Neosporin applied, covered with sterile 4x4  No foul smell noted, there was a small amount of yellow discharge on old dressing  Pt tolerated well

## 2020-01-22 NOTE — NURSING NOTE
Patient DC to [facility] ICU bed 11 for higher level care  Patient has a 20 L FA 1/21 infusing NS +20KCL @ 75, NG to R Nare intact and capped   Report called to Receiving Nurse Claire Mccabe, all paper work sent with transport team

## 2020-01-22 NOTE — ANESTHESIA PREPROCEDURE EVALUATION
Review of Systems/Medical History  Patient summary reviewed  Chart reviewed      Cardiovascular  CHF ,    Pulmonary  COPD , Asthma ,        GI/Hepatic    GERD ,             Endo/Other     GYN       Hematology   Musculoskeletal    Arthritis     Neurology   Psychology   Anxiety,            10/11 2024 10/09 1511 10/06 0710    BLOOD UA    2+Abnormal         Trace-IntactAbnormal         Trace-IntactAbnormal        WBC UA    1+Abnormal         Negative        1+Abnormal        NITRITE UA    Negative        Negative        Negative       PROTEIN UA    Negative mg/dl       Negative mg/dl       Negative mg/dl      CRT UR    --        --        --       Chemistry Labs       (Last 3 results in the past 90 days)    Yesterday 0511 11/05 1523 11/03 1449   Na+    139 mmol/L       138 mmol/L       136 mmol/L      K+    3 7 mmol/L       3 5 mmol/L       3 3Low  mmol/L      Ca2+    8 6 mg/dL       9 1 mg/dL       9 1 mg/dL      Cl    107 mmol/L       101 mmol/L       103 mmol/L      BUN    5Low  mg/dL       11 mg/dL       6Low  mg/dL      CRT    0 42Low  mg/dL       0 60 mg/dL       0 52Low  mg/dL      ALBUMIN    3 1Low  g/dL       3 6 g/dL       3 5 g/dL      Mg++    --        --        --       GLUCOSE    106High  mg/dL       115High  mg/dL       117High  mg/dL      Coagulation Labs       (Last 90 days)    10/11 1926   PTT    28 seconds      PT    15 6High  seconds      INR    1 34       Electrolyte Labs       (Last 3 results in the past 90 days)    Yesterday 0511 11/05 1523 11/03 1449   Na    139 mmol/L       138 mmol/L       136 mmol/L      K    3 7 mmol/L       3 5 mmol/L       3 3Low  mmol/L      Cl    107 mmol/L       101 mmol/L       103 mmol/L      Glucose    --        --        --       Bicarbonate    --        --        --       BUN    5Low  mg/dL       11 mg/dL       6Low  mg/dL      Digitoxin    --        --        --       TSH    --        --        --       Mg    --        --        --       P    --        -- --       CK MB    --        --        --       BNP    --        --        100 pg/mL      Tropinin I    --        --        --                    Anesthesia Plan  ASA Score- 3     Anesthesia Type- IV sedation with anesthesia with ASA Monitors  Additional Monitors:   Airway Plan:         Plan Factors-    Induction- intravenous  Postoperative Plan-     Informed Consent- Anesthetic plan and risks discussed with patient

## 2020-01-22 NOTE — DISCHARGE SUMMARY
Discharge Summary -   Pastor Garcia 82 y o  female MRN: [id number]  Unit/Bed#: -01 Encounter: [id number]    Admission Date: 1/21/2020     Discharge Date:  01/22/2020    Admitting Diagnosis: Vomiting [R11 10]  Chest pain [R07 9]  Abdominal pain [R10 9]  Intractable vomiting with nausea, unspecified vomiting type [R11 2]    Discharge Diagnosis:     Attending:  Payam Ames MD    Consulting Physician(s):  Dr Kaley Hagan Course: Miss Pastor Garcia is 82-year-old  lady was admitted to the medical-surgical floor with acute onset of nausea vomitings and abdominal discomfort  In the emergency department workup patient was found to have gastric outlet obstruction on CT scan of the abdomen  Patien also was found to have Hyponatremia with sodium levels of 133 most likely secondary to her vomiting episodes  Patient was admitted to the medical-surgical floor under telemetry  Patient received unsuccessful EGD by Dr Randall Kebede as he was unable to reach the pylorus due to large amount of solid food and fluid in the stomach body  He recommended the patient to be transferred for a possible surgery  I discussed this plan with patient  Patient was initially reluctant to go all the way to [facility] and requested a local general surgeon to evaluate her  Patient was seen by Dr Lauren Alvarenga  He tried to aspirate and feels that patient has much more than just gastric outlet obstruction  With patient's comorbidities he recommended the patient to be transferred to [facility]  I explained the whole situation to the patient  Patient is reluctantly willing to be transferred to [facility]  I spoke with Dr Mcgrath from [facility] who has accepted the patient in intensive care unit  Earlier patient was started on IV Zosyn with suspicion for a possible perforations/sepsis    Patient's vital seems stable at the time of transfer  Condition at Discharge:  FAIR     Discharge instructions/Information to patient and family:   See after visit summary for information provided to patient and family  Provisions for Follow-Up Care:  See after visit summary for information related to follow-up care and any pertinent home health orders  Disposition: [facility] [address] [place] [address] DR SOLER    Discharge Statement   I spent 50 minutes discharging the patient  This time was spent on the day of discharge  I had direct contact with the patient on the day of discharge  Discharge Medications:  See after visit summary for reconciled discharge medications provided to patient and family

## 2020-01-23 PROBLEM — M72.6 NECROTIZING FASCIITIS (HCC): Status: ACTIVE | Noted: 2020-01-22

## 2020-01-23 PROBLEM — S32.040A COMPRESSION FRACTURE OF L4 VERTEBRA (HCC): Status: ACTIVE | Noted: 2020-01-23

## 2020-01-23 PROBLEM — M72.6 NECROTIZING FASCIITIS (HCC): Status: RESOLVED | Noted: 2020-01-22 | Resolved: 2020-01-23

## 2020-01-23 PROBLEM — K31.1 GASTRIC OUTLET OBSTRUCTION: Status: ACTIVE | Noted: 2020-01-23

## 2020-01-23 PROBLEM — S22.080A CLOSED WEDGE COMPRESSION FRACTURE OF T12 VERTEBRA (HCC): Status: ACTIVE | Noted: 2020-01-23

## 2020-01-23 LAB
ANION GAP SERPL CALCULATED.3IONS-SCNC: 11 MMOL/L (ref 4–13)
ATRIAL RATE: 115 BPM
BASOPHILS # BLD AUTO: 0.02 THOUSANDS/ΜL (ref 0–0.1)
BASOPHILS NFR BLD AUTO: 0 % (ref 0–1)
BUN SERPL-MCNC: 6 MG/DL (ref 5–25)
CALCIUM SERPL-MCNC: 8.5 MG/DL (ref 8.3–10.1)
CHLORIDE SERPL-SCNC: 104 MMOL/L (ref 100–108)
CO2 SERPL-SCNC: 25 MMOL/L (ref 21–32)
CREAT SERPL-MCNC: 0.45 MG/DL (ref 0.6–1.3)
EOSINOPHIL # BLD AUTO: 0 THOUSAND/ΜL (ref 0–0.61)
EOSINOPHIL NFR BLD AUTO: 0 % (ref 0–6)
ERYTHROCYTE [DISTWIDTH] IN BLOOD BY AUTOMATED COUNT: 15.8 % (ref 11.6–15.1)
GFR SERPL CREATININE-BSD FRML MDRD: 94 ML/MIN/1.73SQ M
GLUCOSE SERPL-MCNC: 103 MG/DL (ref 65–140)
HCT VFR BLD AUTO: 38.5 % (ref 34.8–46.1)
HGB BLD-MCNC: 12.2 G/DL (ref 11.5–15.4)
IMM GRANULOCYTES # BLD AUTO: 0.15 THOUSAND/UL (ref 0–0.2)
IMM GRANULOCYTES NFR BLD AUTO: 1 % (ref 0–2)
LACTATE SERPL-SCNC: 1.2 MMOL/L (ref 0.5–2)
LYMPHOCYTES # BLD AUTO: 1.36 THOUSANDS/ΜL (ref 0.6–4.47)
LYMPHOCYTES NFR BLD AUTO: 9 % (ref 14–44)
MCH RBC QN AUTO: 27 PG (ref 26.8–34.3)
MCHC RBC AUTO-ENTMCNC: 31.7 G/DL (ref 31.4–37.4)
MCV RBC AUTO: 85 FL (ref 82–98)
MONOCYTES # BLD AUTO: 0.8 THOUSAND/ΜL (ref 0.17–1.22)
MONOCYTES NFR BLD AUTO: 5 % (ref 4–12)
NEUTROPHILS # BLD AUTO: 13.01 THOUSANDS/ΜL (ref 1.85–7.62)
NEUTS SEG NFR BLD AUTO: 85 % (ref 43–75)
NRBC BLD AUTO-RTO: 0 /100 WBCS
P AXIS: 76 DEGREES
PLATELET # BLD AUTO: 187 THOUSANDS/UL (ref 149–390)
PMV BLD AUTO: 9.6 FL (ref 8.9–12.7)
POTASSIUM SERPL-SCNC: 3.5 MMOL/L (ref 3.5–5.3)
PR INTERVAL: 133 MS
QRS AXIS: -66 DEGREES
QRSD INTERVAL: 92 MS
QT INTERVAL: 321 MS
QTC INTERVAL: 444 MS
RBC # BLD AUTO: 4.52 MILLION/UL (ref 3.81–5.12)
SODIUM SERPL-SCNC: 140 MMOL/L (ref 136–145)
T WAVE AXIS: 66 DEGREES
VENTRICULAR RATE: 115 BPM
WBC # BLD AUTO: 15.34 THOUSAND/UL (ref 4.31–10.16)

## 2020-01-23 PROCEDURE — 80048 BASIC METABOLIC PNL TOTAL CA: CPT | Performed by: SURGERY

## 2020-01-23 PROCEDURE — 99232 SBSQ HOSP IP/OBS MODERATE 35: CPT | Performed by: SURGERY

## 2020-01-23 PROCEDURE — 94640 AIRWAY INHALATION TREATMENT: CPT

## 2020-01-23 PROCEDURE — 85025 COMPLETE CBC W/AUTO DIFF WBC: CPT | Performed by: EMERGENCY MEDICINE

## 2020-01-23 PROCEDURE — 99232 SBSQ HOSP IP/OBS MODERATE 35: CPT | Performed by: INTERNAL MEDICINE

## 2020-01-23 PROCEDURE — 94760 N-INVAS EAR/PLS OXIMETRY 1: CPT

## 2020-01-23 PROCEDURE — 99222 1ST HOSP IP/OBS MODERATE 55: CPT | Performed by: NEUROLOGICAL SURGERY

## 2020-01-23 PROCEDURE — 93010 ELECTROCARDIOGRAM REPORT: CPT | Performed by: INTERNAL MEDICINE

## 2020-01-23 PROCEDURE — C9113 INJ PANTOPRAZOLE SODIUM, VIA: HCPCS | Performed by: FAMILY MEDICINE

## 2020-01-23 PROCEDURE — 83605 ASSAY OF LACTIC ACID: CPT | Performed by: SURGERY

## 2020-01-23 PROCEDURE — C9113 INJ PANTOPRAZOLE SODIUM, VIA: HCPCS | Performed by: PHYSICIAN ASSISTANT

## 2020-01-23 RX ORDER — MAGNESIUM SULFATE HEPTAHYDRATE 40 MG/ML
2 INJECTION, SOLUTION INTRAVENOUS ONCE
Status: COMPLETED | OUTPATIENT
Start: 2020-01-23 | End: 2020-01-23

## 2020-01-23 RX ORDER — POTASSIUM CHLORIDE 14.9 MG/ML
20 INJECTION INTRAVENOUS
Status: DISPENSED | OUTPATIENT
Start: 2020-01-23 | End: 2020-01-23

## 2020-01-23 RX ORDER — HYDROMORPHONE HCL/PF 1 MG/ML
0.2 SYRINGE (ML) INJECTION
Status: DISCONTINUED | OUTPATIENT
Start: 2020-01-23 | End: 2020-02-03

## 2020-01-23 RX ORDER — ALBUTEROL SULFATE 2.5 MG/3ML
SOLUTION RESPIRATORY (INHALATION)
Status: COMPLETED
Start: 2020-01-23 | End: 2020-01-23

## 2020-01-23 RX ORDER — POTASSIUM CHLORIDE 14.9 MG/ML
20 INJECTION INTRAVENOUS
Status: COMPLETED | OUTPATIENT
Start: 2020-01-23 | End: 2020-01-23

## 2020-01-23 RX ORDER — BISACODYL 10 MG
10 SUPPOSITORY, RECTAL RECTAL DAILY
Status: DISCONTINUED | OUTPATIENT
Start: 2020-01-23 | End: 2020-02-14 | Stop reason: HOSPADM

## 2020-01-23 RX ORDER — LEVALBUTEROL INHALATION SOLUTION 0.63 MG/3ML
0.63 SOLUTION RESPIRATORY (INHALATION) EVERY 6 HOURS PRN
Status: DISCONTINUED | OUTPATIENT
Start: 2020-01-23 | End: 2020-01-24

## 2020-01-23 RX ORDER — POTASSIUM CHLORIDE 14.9 MG/ML
20 INJECTION INTRAVENOUS
Status: DISCONTINUED | OUTPATIENT
Start: 2020-01-23 | End: 2020-01-23

## 2020-01-23 RX ORDER — HYDROMORPHONE HCL/PF 1 MG/ML
0.5 SYRINGE (ML) INJECTION
Status: DISCONTINUED | OUTPATIENT
Start: 2020-01-23 | End: 2020-02-03

## 2020-01-23 RX ADMIN — BACITRACIN ZINC, NEOMYCIN SULFATE, AND POLYMYXIN B SULFATE 1 SMALL APPLICATION: 400; 3.5; 5 OINTMENT TOPICAL at 09:16

## 2020-01-23 RX ADMIN — DEXTRAN 70 AND HYPROMELLOSE 2910 1 DROP: 1; 3 SOLUTION/ DROPS OPHTHALMIC at 09:16

## 2020-01-23 RX ADMIN — POTASSIUM CHLORIDE 20 MEQ: 14.9 INJECTION, SOLUTION INTRAVENOUS at 04:51

## 2020-01-23 RX ADMIN — POTASSIUM CHLORIDE 20 MEQ: 14.9 INJECTION, SOLUTION INTRAVENOUS at 20:56

## 2020-01-23 RX ADMIN — DEXTRAN 70 AND HYPROMELLOSE 2910 1 DROP: 1; 3 SOLUTION/ DROPS OPHTHALMIC at 21:07

## 2020-01-23 RX ADMIN — HYDROMORPHONE HYDROCHLORIDE 0.4 MG: 1 INJECTION, SOLUTION INTRAMUSCULAR; INTRAVENOUS; SUBCUTANEOUS at 11:20

## 2020-01-23 RX ADMIN — BISACODYL 10 MG: 10 SUPPOSITORY RECTAL at 11:20

## 2020-01-23 RX ADMIN — METOPROLOL TARTRATE 2.5 MG: 5 INJECTION INTRAVENOUS at 09:16

## 2020-01-23 RX ADMIN — METOPROLOL TARTRATE 2.5 MG: 5 INJECTION INTRAVENOUS at 04:20

## 2020-01-23 RX ADMIN — LORAZEPAM 0.25 MG: 2 INJECTION INTRAMUSCULAR; INTRAVENOUS at 23:28

## 2020-01-23 RX ADMIN — MAGNESIUM SULFATE HEPTAHYDRATE 2 G: 40 INJECTION, SOLUTION INTRAVENOUS at 04:19

## 2020-01-23 RX ADMIN — HEPARIN SODIUM 5000 UNITS: 5000 INJECTION INTRAVENOUS; SUBCUTANEOUS at 07:14

## 2020-01-23 RX ADMIN — METOPROLOL TARTRATE 2.5 MG: 5 INJECTION INTRAVENOUS at 16:11

## 2020-01-23 RX ADMIN — HYDROMORPHONE HYDROCHLORIDE 0.2 MG: 1 INJECTION, SOLUTION INTRAMUSCULAR; INTRAVENOUS; SUBCUTANEOUS at 16:15

## 2020-01-23 RX ADMIN — POTASSIUM CHLORIDE 20 MEQ: 14.9 INJECTION, SOLUTION INTRAVENOUS at 11:20

## 2020-01-23 RX ADMIN — POTASSIUM CHLORIDE 20 MEQ: 14.9 INJECTION, SOLUTION INTRAVENOUS at 16:11

## 2020-01-23 RX ADMIN — HEPARIN SODIUM 5000 UNITS: 5000 INJECTION INTRAVENOUS; SUBCUTANEOUS at 13:38

## 2020-01-23 RX ADMIN — PANTOPRAZOLE SODIUM 40 MG: 40 INJECTION, POWDER, FOR SOLUTION INTRAVENOUS at 22:43

## 2020-01-23 RX ADMIN — DEXTRAN 70 AND HYPROMELLOSE 2910 1 DROP: 1; 3 SOLUTION/ DROPS OPHTHALMIC at 17:30

## 2020-01-23 RX ADMIN — DEXTRAN 70 AND HYPROMELLOSE 2910 1 DROP: 1; 3 SOLUTION/ DROPS OPHTHALMIC at 00:00

## 2020-01-23 RX ADMIN — SODIUM CHLORIDE, SODIUM GLUCONATE, SODIUM ACETATE, POTASSIUM CHLORIDE, MAGNESIUM CHLORIDE, SODIUM PHOSPHATE, DIBASIC, AND POTASSIUM PHOSPHATE 75 ML/HR: .53; .5; .37; .037; .03; .012; .00082 INJECTION, SOLUTION INTRAVENOUS at 13:37

## 2020-01-23 RX ADMIN — METOPROLOL TARTRATE 2.5 MG: 5 INJECTION INTRAVENOUS at 22:44

## 2020-01-23 RX ADMIN — LEVALBUTEROL HYDROCHLORIDE 0.63 MG: 0.63 SOLUTION RESPIRATORY (INHALATION) at 03:50

## 2020-01-23 RX ADMIN — ALBUTEROL SULFATE 2.5 MG: 2.5 SOLUTION RESPIRATORY (INHALATION) at 23:24

## 2020-01-23 RX ADMIN — HEPARIN SODIUM 5000 UNITS: 5000 INJECTION INTRAVENOUS; SUBCUTANEOUS at 22:43

## 2020-01-23 RX ADMIN — IPRATROPIUM BROMIDE AND ALBUTEROL SULFATE 3 ML: 2.5; .5 SOLUTION RESPIRATORY (INHALATION) at 08:17

## 2020-01-23 RX ADMIN — PANTOPRAZOLE SODIUM 40 MG: 40 INJECTION, POWDER, FOR SOLUTION INTRAVENOUS at 09:16

## 2020-01-23 RX ADMIN — IPRATROPIUM BROMIDE AND ALBUTEROL SULFATE 3 ML: 2.5; .5 SOLUTION RESPIRATORY (INHALATION) at 19:44

## 2020-01-23 NOTE — H&P
Consultation - Acute Care Surgery   Sukumar Marie 82 y o  female MRN: [id number]  Unit/Bed#: ICU 11 Encounter: [id number]      Assessment/Plan      Assessment:  Patient is a 82-year-old female with gastric outlet obstruction  EGD performed at [facility] facility on 01/22 consistent with severe gastric outlet obstruction, unable to reach the pylorus due to large amount of solid food in stomach  Transferred for higher level of care  Afebrile  Tachycardic heart rate 120s, AFib  Respiratory rate 24  Blood pressure 96/64 maps 82  Saturating 92% on 2 L nasal cannula  Abdomen soft, nontender nondistended  No rebound, guarding, or peritoneal signs  NG tube with minimal output  WBC: increased from 6 4-> 15 95 today  Plan:  NPO/ NGT   IVF- isolyte 125cc/h  Aspiration precautions  Hold eliquis  GI consult recommend EGD in morning, attempt to remove gastric material  Rate/rhythm control for Afib  MAPS>65  Serial abdominal exams    History of Present Illness   Physician Requesting Consult: Xu Benitez DO  Reason for Consult / Principal Problem: gastric outlet obstruction  History, ROS and PFSH unobtainable from any source due to none  HPI: Sukumar Marie is a 82y o  year old female past medical history of emphysema, diverticulosis, COPD, colitis, AFib on Eliquis, prior colon surgery possibly secondary to diverticulitis, who presents from an outside facility at [facility] with gastric outlet obstruction  CT imaging consistent with dilated stomach filled with fluid and food debris, and distended esophagus  Patient underwent EGD at the outside facility, and there was a large amount of gastric debris and food stuff in the stomach, however the pylorus was not visualized  Patient takes Eliquis at home for AFib  Denied any abdominal pain whatsoever on exam today    Explained that she has had multiple episodes of nausea and vomiting over the past week, but had multiple bowel movements yesterday  Denied passing flatus today  Denied any current symptoms of nausea or vomiting, however the NG tube is causing her great amount of discomfort  Denied any fevers, chills, chest pain or shortness of breath today  Consults    Review of Systems   Constitutional: Negative for chills and fever  HENT: Negative  Eyes: Negative  Respiratory: Negative  Negative for apnea, cough, choking, chest tightness and shortness of breath  Cardiovascular: Negative for chest pain, palpitations and leg swelling  Gastrointestinal: Negative for abdominal distention, abdominal pain, constipation, diarrhea, nausea and vomiting  Endocrine: Negative  Genitourinary: Negative  Musculoskeletal: Negative  Skin: Negative  Allergic/Immunologic: Negative  Neurological: Negative  Hematological: Negative  Psychiatric/Behavioral: Negative  Historical Information   Past Medical History:   Diagnosis Date    Asthma     Atrial fibrillation     Blurred vision     Cardiac disease     Colitis     COPD (chronic obstructive pulmonary disease)     Diverticulosis     DJD (degenerative joint disease)     Emphysema lung     Gait abnormality      Past Surgical History:   Procedure Laterality Date    BLADDER SURGERY      COLON SURGERY      COLONOSCOPY W/ POLYPECTOMY N/A 2019    Procedure: COLONOSCOPY W/ POLYPECTOMY;  Surgeon: Elvis Ndiaye MD;  Location: [address] [place] [address] GI LAB;   Service: General    SMALL INTESTINE SURGERY       Social History   Social History     Substance and Sexual Activity   Alcohol Use Not Currently    Frequency: Never    Binge frequency: Never     Social History     Substance and Sexual Activity   Drug Use No     Social History     Tobacco Use   Smoking Status Former Smoker    Last attempt to quit: 2013    Years since quittin 1   Smokeless Tobacco Never Used     Family History: non-contributory}    Meds/Allergies   all current active meds have been reviewed  Allergies   Allergen Reactions    Fluticasone        Objective   Vitals: Blood pressure 96/64, pulse (!) 124, temperature 99 1 °F (37 3 °C), temperature source Oral, resp  rate (!) 24, height 5' (1 524 m), weight 62 4 kg (137 lb 9 1 oz), SpO2 92 %, not currently breastfeeding  ,Body mass index is 26 87 kg/m²  No intake or output data in the 24 hours ending 01/22/20 2138  Invasive Devices     Peripheral Intravenous Line            Peripheral IV 01/21/20 Left Forearm 1 day    Peripheral IV 01/22/20 Right Hand less than 1 day          Drain            NG/OG/Enteral Tube Nasogastric 16 Fr Right nares 1 day                Physical Exam   Constitutional: She is oriented to person, place, and time  She appears well-developed and well-nourished  HENT:   Head: Normocephalic and atraumatic  Eyes: Pupils are equal, round, and reactive to light  No scleral icterus  Neck: Normal range of motion  Neck supple  No JVD present  No tracheal deviation present  Cardiovascular: Normal rate, regular rhythm and normal heart sounds  Pulmonary/Chest: Effort normal and breath sounds normal  No respiratory distress  Abdominal: Soft  She exhibits no distension and no mass  There is no tenderness  There is no rebound and no guarding  Genitourinary:   Genitourinary Comments: deferred   Musculoskeletal: Normal range of motion  She exhibits no edema  Neurological: She is oriented to person, place, and time  Skin: Skin is warm  Capillary refill takes less than 2 seconds  Psychiatric: She has a normal mood and affect  Vitals reviewed  Lab Results:   I have personally reviewed pertinent reports    , Coags:   Lab Results   Component Value Date    PTT 28 01/22/2020    INR 1 09 01/22/2020   , Creatinine:   Lab Results   Component Value Date    CREATININE 0 48 (L) 01/22/2020   , CBC with diff:   Lab Results   Component Value Date    WBC 15 95 (H) 01/22/2020    HGB 12 3 01/22/2020    HCT 38 9 01/22/2020    MCV 86 01/22/2020     01/22/2020    MCH 27 2 01/22/2020    MCHC 31 6 01/22/2020    RDW 15 8 (H) 01/22/2020    MPV 10 8 01/22/2020    NRBC 0 01/22/2020   , BMP/CMP:   Lab Results   Component Value Date    SODIUM 135 01/22/2020    K 4 1 01/22/2020     01/22/2020    CO2 25 01/22/2020    BUN 7 01/22/2020    CREATININE 0 48 (L) 01/22/2020    CALCIUM 8 9 01/22/2020    AST 21 01/22/2020    ALT 26 01/22/2020    ALKPHOS 73 01/22/2020    EGFR 92 01/22/2020     Imaging Studies: I have personally reviewed pertinent reports  EKG, Pathology, and Other Studies: I have personally reviewed pertinent reports  VTE Prophylaxis: Sequential compression device [place])      Code Status: Level 1 - Full Code  Advance Directive and Living Will: Yes    Power of :    POLST:      Counseling / Coordination of Care  Counseling/Coordination of Care: Total floor / unit time spent today 30 minutes  Greater than 50% of total time was spent with the patient and / or family counseling and / or coordination of care   A description of the counseling / coordination of care: 30

## 2020-01-23 NOTE — ASSESSMENT & PLAN NOTE
S/p ex lap, antrectomy, Billroth II reconstruction with postop ileus  Management per primary / surgery team

## 2020-01-23 NOTE — PROGRESS NOTES
Progress Note - General Surgery   Sharri Caicedo 82 y o  female MRN: [id number]  Unit/Bed#: ICU 11 Encounter: [id number]    Assessment:  84yF w/ GOO  Etiology currently unknown  Malignancy vs peptic stricture  Plan:  Cont NPO/NGT  GI to do EGD and remove gastric material and further assess  Holding Eliquis for Afib  Rest of care per ICU    Subjective/Objective   Subjective:   No nausea with NGT in place    Objective:     Blood pressure 116/72, pulse (!) 116, temperature 98 8 °F (37 1 °C), resp  rate (!) 27, height 5' (1 524 m), weight 62 4 kg (137 lb 9 1 oz), SpO2 95 %, not currently breastfeeding  ,Body mass index is 26 87 kg/m²  Intake/Output Summary (Last 24 hours) at 1/23/2020 0931  Last data filed at 1/23/2020 0600  Gross per 24 hour   Intake 1122 08 ml   Output 1925 ml   Net -802 92 ml       Invasive Devices     Peripheral Intravenous Line            Peripheral IV 01/21/20 Left Forearm 1 day    Peripheral IV 01/22/20 Right Hand less than 1 day          Drain            NG/OG/Enteral Tube Nasogastric 16 Fr Right nares 1 day                Physical Exam:     Gen: NAD, AAOx3  CV: Rate controlled afib  NGT in place  Pulm: no resp distress  Abd: Soft, non-distended, non-tender      Lab, Imaging and other studies:  I have personally reviewed pertinent lab results    , CBC:   Lab Results   Component Value Date    WBC 15 34 (H) 01/23/2020    HGB 12 2 01/23/2020    HCT 38 5 01/23/2020    MCV 85 01/23/2020     01/23/2020    MCH 27 0 01/23/2020    MCHC 31 7 01/23/2020    RDW 15 8 (H) 01/23/2020    MPV 9 6 01/23/2020    NRBC 0 01/23/2020   , CMP:   Lab Results   Component Value Date    SODIUM 140 01/23/2020    K 3 5 01/23/2020     01/23/2020    CO2 25 01/23/2020    BUN 6 01/23/2020    CREATININE 0 45 (L) 01/23/2020    CALCIUM 8 5 01/23/2020    AST 24 01/22/2020    ALT 42 01/22/2020    ALKPHOS 111 01/22/2020    EGFR 94 01/23/2020   , Coagulation:   Lab Results   Component Value Date    INR 1 09 01/22/2020   , Urinalysis: No results found for: COLORU, CLARITYU, SPECGRAV, PHUR, LEUKOCYTESUR, NITRITE, PROTEINUA, GLUCOSEU, KETONESU, BILIRUBINUR, BLOODU  VTE Pharmacologic Prophylaxis: Sequential compression device (Venodyne)   VTE Mechanical Prophylaxis: sequential compression device

## 2020-01-23 NOTE — PROGRESS NOTES
Daily Progress Note - Critical Care   Luis Cm 82 y o  female MRN: [id number]  Unit/Bed#: ICU 11 Encounter: [id number]        ----------------------------------------------------------------------------------------  HPI/24hr events: Luis Cm is a 82 y o  female who presents to [facility]  emergency department on 1/21,  complaining of abdominal pain, nausea, vomiting, and chest pain  She was worked up and found to have gastric outlet obstruction on CT scan of the abdomen   Patient was admitted to the medical-surgical floor under telemetry  Patient was seen by Dr Lanny Marti who preformed an EGD on 1/22  Román Barnhart revealed a gastric outlet obstruction with large amount of food present in the antrum as well as the upper gastric body and fundus  Dr Syed Tomlin tried to aspirate, but feels that patient has much more than just gastric outlet obstruction   With patient's comorbidities, he recommended the patient to be transferred to [facility]  The patient was accepted by Dr Isai Tobar and started on IV Zosyn for suspicion for possible perforation/sepsis  Overnight, patient was seen by red surgery who requested that a non-emergent GI consult be placed for possibly a repeat EGD  Abx were stopped  NGT output minimal, since placement  Dr Kaylah Gamez was able to flush and aspirate whole pieces of food out of NGT     ---------------------------------------------------------------------------------------  SUBJECTIVE    Review of Systems   Constitutional: Positive for appetite change  Negative for activity change, chills, diaphoresis, fatigue, fever and unexpected weight change  HENT: Negative  Eyes: Negative  Respiratory: Negative  Post Duoneb, states that she can breath well   Cardiovascular: Negative  Gastrointestinal: Positive for abdominal distention  Negative for abdominal pain, constipation, diarrhea, nausea and vomiting  Endocrine: Negative      Genitourinary: Negative  Musculoskeletal: Negative  Skin: Positive for wound  Negative for color change, pallor and rash  Wound RLL   Allergic/Immunologic: Negative  Neurological: Negative  Hematological: Negative  Psychiatric/Behavioral: Negative         ---------------------------------------------------------------------------------------  Assessment and Plan:    Neuro:   · Analgesia  ? Dilaudid 0 2 Moderate PRN q4  ? Dilaudid 0 4 Severe PRN q3  ? Acetaminophen 650mg PRN  · Anxiety  ? Ativan 0 25 IV qHS  · Monitor CAM ICU daily  · Regulate Sleep/Wake        CV:   · Diagnosis: Tachycardia, History of Atrial Fibrillation, HLD  ? Plan:   ? HR responded to 1L Isolyte bolus  ? Metoprolol 2 5mg q6  ? Hold Eliquis for surgical procedure  ? Hold home Cardizem CD 240mg, lopressor, and Cholestyramine 4g packet        Pulm:  · Diagnosis: Emphysema, COPD, Asthma  ? Plan:   § Continue Proventil HFA 2 puffs q6 PRN,   § Duoneb q6  § Hold home singular  § Pulmonary Toileting         GI:   · Gastric Obstruction  ? Continue NGT to LIS  ? Continue NPO  ? Per surgery, GI consult in the AM for possible repeat EGD  · Diagnosis: GERD  ? Plan:   § Continue home Protonix IV 40mg q12        :   ? No acute issues  ? Creat 0 48/BUN 7        F/E/N:   · Plan:   ? NPO until surgery assesses  ? Continue to monitor electrolytes and replace as needed  ? Isolyte @ 125        Heme/Onc:   · No acute issues        Endo:   · No acute issues        ID:   · Afebrile  · WBC increased from from 6 to 15 5  · Morning CBC pending  · She received Zosyn 3 [address] at [address] for suspicion of infection  · Continue to monitor Temp/WBCs        MSK/Skin:   · Diagnosis: LLE Laceration  ?  Plan:   § Continue applying neosporin  § Wound care daily  · Assist with turning and repositioning q2      Disposition: Continue Critical Care   Code Status: Level 1 - Full Code  ---------------------------------------------------------------------------------------  ICU CORE MEASURES    Prophylaxis   VTE Pharmacologic Prophylaxis: Heparin  VTE Mechanical Prophylaxis: sequential compression device  Stress Ulcer Prophylaxis: Prophylaxis Not Indicated     ABCDE Protocol (if indicated)  Plan to perform spontaneous awakening trial today? Not applicable  Plan to perform spontaneous breathing trial today? Not applicable  Obvious barriers to extubation? Not applicable  CAM-ICU: Negative    Invasive Devices Review  Invasive Devices     Peripheral Intravenous Line            Peripheral IV 20 Left Forearm 1 day    Peripheral IV 20 Right Hand less than 1 day          Drain            NG/OG/Enteral Tube Nasogastric 16 Fr Right nares 1 day              Can any invasive devices be discontinued today? Not applicable  ---------------------------------------------------------------------------------------  OBJECTIVE    Vitals   Vitals:    20 0200 20 0300 20 0351 20 0400   BP: 112/63 104/56  (!) 121/48   Pulse: (!) 120 (!) 108  (!) 114   Resp: (!) 31 20  (!) 34   Temp:    98 8 °F (37 1 °C)   TempSrc:       SpO2: 92% 93% 91% 90%   Weight:       Height:         Temp (24hrs), Av 7 °F (37 1 °C), Min:96 9 °F (36 1 °C), Max:99 4 °F (37 4 °C)  Current: Temperature: 98 8 °F (37 1 °C)    Physical Exam   Constitutional: She is oriented to person, place, and time  She appears well-developed and well-nourished  No distress  HENT:   Head: Normocephalic and atraumatic  Eyes: Pupils are equal, round, and reactive to light  EOM are normal    Neck: Normal range of motion  Cardiovascular: Regular rhythm, normal heart sounds and normal pulses  Tachycardia present  Pulmonary/Chest: Effort normal and breath sounds normal    Abdominal: Soft  She exhibits distension  Bowel sounds are decreased  There is no tenderness  Neurological: She is alert and oriented to person, place, and time  She has normal strength  No cranial nerve deficit or sensory deficit  GCS eye subscore is 4  GCS verbal subscore is 5  GCS motor subscore is 6  Skin: Skin is warm and dry  Capillary refill takes less than 2 seconds  She is not diaphoretic  Psychiatric: She has a normal mood and affect  Her speech is normal and behavior is normal  Judgment and thought content normal  Cognition and memory are normal          Respiratory:    O2 Flow Rate (L/min): 8 L/min    Invasive/non-invasive ventilation settings   Respiratory    Lab Data (Last 4 hours)    None         O2/Vent Data (Last 4 hours)    None                Laboratory and Diagnostics:  Results from last 7 days   Lab Units 01/22/20 2144 01/22/20  0502 01/21/20  0156   WBC Thousand/uL 15 95* 6 40 8 20   HEMOGLOBIN g/dL 12 3 12 3 13 0   HEMATOCRIT % 38 9 39 7* 40 8*   PLATELETS Thousands/uL 235 199 232   NEUTROS PCT % 88* 72 79*   MONOS PCT % 5 11 9     Results from last 7 days   Lab Units 01/22/20 2144 01/22/20  0502 01/21/20  0156   SODIUM mmol/L 136 135 133*   POTASSIUM mmol/L 3 6 4 1 3 4*   CHLORIDE mmol/L 101 101 99   CO2 mmol/L 25 25 28   ANION GAP mmol/L 10 9 6   BUN mg/dL 7 7 12   CREATININE mg/dL 0 48* 0 48* 0 50*   CALCIUM mg/dL 8 9 8 9 9 3   GLUCOSE RANDOM mg/dL 108 99 147*   ALT U/L 42 26 26   AST U/L 24 21 16   ALK PHOS U/L 111 73 81   ALBUMIN g/dL 3 1* 3 9 4 1   TOTAL BILIRUBIN mg/dL 0 38 0 30 0 20     Results from last 7 days   Lab Units 01/22/20 2144 01/21/20  0156   MAGNESIUM mg/dL 2 4 2 5   PHOSPHORUS mg/dL 3 7  --       Results from last 7 days   Lab Units 01/22/20 2144   INR  1 09   PTT seconds 28      Results from last 7 days   Lab Units 01/21/20  0156   TROPONIN I ng/mL <0 03         ABG:    VBG:          Micro          Imaging:  I have personally reviewed pertinent reports        Intake and Output  I/O       01/21 0701 - 01/22 0700 01/22 0701 - 01/23 0700    I V  (mL/kg)  820 8 (13 2)    Total Intake(mL/kg)  820 8 (13 2)    Urine (mL/kg/hr)  1025    Emesis/NG output  150    Total Output  1175    Net  -354 2                Height and Weights   Height: 5' (152 4 cm)  IBW: 45 5 kg  Body mass index is 26 87 kg/m²  Weight (last 2 days)     Date/Time   Weight    01/22/20 1953   62 4 (137 57)                Nutrition       Diet Orders   (From admission, onward)             Start     Ordered    01/22/20 2112  Diet NPO  Diet effective now     Question Answer Comment   Diet Type NPO    RD to adjust diet per protocol?  Yes        01/22/20 2111                  Active Medications  Scheduled Meds:  Current Facility-Administered Medications:  acetaminophen 650 mg Rectal Q6H PRN Malissa Bird Petit-Me    dextran 70-hypromellose 1 drop Both Eyes TID Gina Bennett MD    heparin (porcine) 5,000 Units Subcutaneous [facility] LEEROY Adler    HYDROmorphone 0 2 mg Intravenous Q4H PRN Malissa Bird Petit-Me    HYDROmorphone 0 4 mg Intravenous Q3H PRN Malissa Bird Petit-Me    ipratropium-albuterol 3 mL Nebulization Q6H While awake Malissa Bird Petit-Me    levalbuterol 0 63 mg Nebulization Q6H PRN Malissa Bird Petit-Me    LORazepam 0 25 mg Intravenous HS PRN Malissa Bird Petit-Me    magnesium sulfate 2 g Intravenous Once Gloria Aragon MD    metoprolol 2 5 mg Intravenous Q6H Gloria Aragon MD    multi-electrolyte 75 mL/hr Intravenous Continuous Malissa Bird Petit-Me Last Rate: 75 mL/hr (01/23/20 0419)   neomycin-bacitracin-polymyxin b 1 small application Topical Daily Gina Bennett MD    ondansetron 4 mg Intravenous Q6H PRN Gina Bennett MD    pantoprazole 40 mg Intravenous Q12H [address] Gina Bennett MD    potassium chloride 20 mEq Intravenous Angelia Dinero MD    saliva substitute 5 spray Mouth/Throat 4x Daily PRN Gina Bennett MD      Continuous Infusions:    multi-electrolyte 75 mL/hr Last Rate: 75 mL/hr (01/23/20 0419)     PRN Meds:     acetaminophen 650 mg Q6H PRN   HYDROmorphone 0 2 mg Q4H PRN   HYDROmorphone 0 4 mg Q3H PRN   levalbuterol 0 63 mg Q6H PRN   LORazepam 0 25 mg HS PRN   ondansetron 4 mg Q6H PRN   saliva substitute 5 spray 4x Daily PRN       Allergies Allergies   Allergen Reactions    Fluticasone      ---------------------------------------------------------------------------------------  Advance Directive and Living Will: Yes    Power of :    POLST:    ---------------------------------------------------------------------------------------  Counseling / Coordination of Care  Total Critical Care time spent 32 minutes excluding procedures, teaching and family updates  LEEROY Barger      Portions of the record may have been created with voice recognition software  Occasional wrong word or "sound a like" substitutions may have occurred due to the inherent limitations of voice recognition software    Read the chart carefully and recognize, using context, where substitutions have occurred

## 2020-01-23 NOTE — ASSESSMENT & PLAN NOTE
T12 and L4 compression fracture with no known previous trauma, found incidentally during workup for nausea and vomiting  · Patient admits to 1 month history of back pain, sees chiropractor and wears lumbar brace  · Works as an aide on a school bus  · +tenderness with palpation low lumbar spine, no BLE deficits    Imaging:   · CTA CAP w/contrast, 1/21/20: Mild superior endplate depression at P43 new since prior exam   Moderate superior endplate compression fracture of L4, progressed since prior exam    Plan:  · LSO brace as needed when abdominal pain improved   · Upright xrays with brace on when able  · Lumbar spine precautions  · PT/OT with brace on when able  · Pain control per primary team  · DVT ppx: SCDs, pharm ppx per primary team  · No neurosurgical intervention is anticipated    Neurosurgery will continue to follow after upright xrays are obtained  Please don't hesitate to contact us with any additional questions or concerns

## 2020-01-23 NOTE — PROGRESS NOTES
Progress Note- Sravani Nava 82 y o  female MRN: [id number]  Unit/Bed#: ICU 11 Encounter: [id number]    Assessment and Plan:  Sravani Nava is a 82 y o  old female with PMH: Atrial fibrillation on Eliquis, COPD, emphysema, diverticulosis, L4 vertebral compression fracture, previous colonic surgery who presents as a transfer for evaluation of gastric outlet obstruction  #Gastric Outlet Obstruction? #Severe Constipation  CT scan on 01/21/2020 showed distended esophagus and stomach with fluid and food debris  Duodenum and small bowel relatively normal in caliber  However upon our review of the imaging patient is noted to have large stool burden throughout her colon  Given that EGD was attempted yesterday with patient found to have significant amount of food within the esophagus and stomach, with imaging still showing evidence significantly dilated gastric body with food content remnants,  It is unlikely we will be able to efficiently visualize source for obstruction  We recommend that we try to improve some of her bowel constipation in hopes that there may be some improvement in her more proximal gastric obstruction  If this does not help, we can attempt to do a repeat endoscopy  Would recommend bowel regimen and evacuation should be from rectum only  Would avoid any type of oral suppositories or bowel regimen in setting of concern for obstruction  Plan:  -Aggressive bowel regimem   -bisacodyl suppository   -tap water enema q4-6hr   -lactulose enema 200g TID   -mineral oil enema PRN  -NG tube to intermittent wall suction  -Plan for possible EGD tomorrow, NPO at MN  -avoid promotility agents  ______________________________________________________________________    Subjective:     Patient states she is tired but not in any significant distress  She states she had 5 bowel movements yesterday but the stool was still dry and hard    Does have some associated nausea but denies any abdominal pain at this time  Denies any recent sick contacts       Medication Administration - last 24 hours from 01/22/2020 0852 to 01/23/2020 7464       Date/Time Order Dose Route Action Action by     01/23/2020 0000 dextran 70-hypromellose (GENTEAL TEARS) 0 1-0 3 % ophthalmic solution 1 drop 1 drop Both Eyes Given Jasmyn Cronin RN     01/22/2020 2248 levalbuterol (XOPENEX) inhalation solution 1 25 mg   Nebulization Canceled Entry Darlene Merrill, RT     01/22/2020 2248 sodium chloride 0 9 % inhalation solution 3 mL   Nebulization Canceled Entry Darlene Merrill, RT     01/22/2020 2144 pantoprazole (PROTONIX) injection 40 mg 40 mg Intravenous Given Vera Welch RN     01/22/2020 2132 sodium chloride 0 9 % with KCl 20 mEq/L infusion (premix) 75 mL/hr Intravenous Not Given Vera Wlech RN     01/22/2020 2145 multi-electrolyte (PLASMALYTE-A/ISOLYTE-S PH 7 4) IV solution 100 mL/hr Intravenous Not Given Vera Welch RN     01/23/2020 7906 ipratropium-albuterol (DUO-NEB) 0 5-2 5 mg/3 mL inhalation solution 3 mL 3 mL Nebulization Given Roya Francisco, RT     01/22/2020 2248 ipratropium-albuterol (DUO-NEB) 0 5-2 5 mg/3 mL inhalation solution 3 mL 3 mL Nebulization Given Darlene Merrill, RT     01/23/2020 0419 multi-electrolyte (PLASMALYTE-A/ISOLYTE-S PH 7 4) IV solution 75 mL/hr Intravenous Rate/Dose Change Jasmyn Cronin RN     01/22/2020 2145 multi-electrolyte (PLASMALYTE-A/ISOLYTE-S PH 7 4) IV solution 125 mL/hr Intravenous [address] Vera Welch, [address] [place] [address]     01/22/2020 2145 multi-electrolyte (ISOLYTE-S PH 7 4) bolus 1,000 mL 1,000 mL Intravenous [address] eVra Welch, [address] [place] [address]     01/23/2020 0420 metoprolol (LOPRESSOR) injection 2 5 mg 2 5 mg Intravenous Given Jasmyn Cronin RN     01/22/2020 2152 metoprolol (LOPRESSOR) injection 2 5 mg 2 5 mg Intravenous Given Vera Welch RN     01/23/2020 1815 heparin (porcine) subcutaneous injection 5,000 Units 5,000 Units Subcutaneous Given Jasmyn Cronin RN 01/22/2020 2329 heparin (porcine) subcutaneous injection 5,000 Units 5,000 Units Subcutaneous Given Olga Piper RN     01/23/2020 0350 levalbuterol (XOPENEX) inhalation solution 0 63 mg 0 63 mg Nebulization Given Bianca Reyes, RT     01/23/2020 1218 potassium chloride 20 mEq IVPB (premix) 20 mEq Intravenous Not Given Olga Piper RN     01/23/2020 0451 potassium chloride 20 mEq IVPB (premix) 20 mEq Intravenous [address] [facility]     01/23/2020 0757 magnesium sulfate 2 g/50 mL IVPB (premix) 2 g 2 g Intravenous New Bag Olga Piper RN          Objective:     Vitals: Blood pressure 93/61, pulse (!) 110, temperature 98 8 °F (37 1 °C), resp  rate 21, height 5' (1 524 m), weight 62 4 kg (137 lb 9 1 oz), SpO2 96 %, not currently breastfeeding  ,Body mass index is 26 87 kg/m²  Intake/Output Summary (Last 24 hours) at 1/23/2020 1570  Last data filed at 1/23/2020 0600  Gross per 24 hour   Intake 1122 08 ml   Output 1925 ml   Net -802 92 ml       Physical Exam:   General Appearance:    elderly female on 2 L of nasal cannula O2   HEENT:    NG tube with minimal output   Neck:  Supple, symmetrical, trachea midline   Lungs:    distant breath sounds   Heart[de-identified]   Regular rate and rhythm; no murmur, rub, or gallop     Abdomen:    soft nontender to deep palpation   Genitalia:   Deferred    Rectal:   Deferred    Extremities:  No cyanosis, clubbing or edema    Pulses:  2+ and symmetric all extremities    Skin:  No jaundice, rashes, or lesions    Lymph nodes:  No palpable cervical lymphadenopathy        Invasive Devices     Peripheral Intravenous Line            Peripheral IV 01/21/20 Left Forearm 1 day    Peripheral IV 01/22/20 Right Hand less than 1 day          Drain            NG/OG/Enteral Tube Nasogastric 16 Fr Right nares 1 day                Lab Results:  Admission on 01/22/2020   Component Date Value    WBC 01/22/2020 15 95*    RBC 01/22/2020 4 53     Hemoglobin 01/22/2020 12 3     Hematocrit 01/22/2020 38 9     MCV 01/22/2020 86     MCH 01/22/2020 27 2     MCHC 01/22/2020 31 6     RDW 01/22/2020 15 8*    MPV 01/22/2020 10 8     Platelets 44/67/7382 235     nRBC 01/22/2020 0     Neutrophils Relative 01/22/2020 88*    Immat GRANS % 01/22/2020 1     Lymphocytes Relative 01/22/2020 6*    Monocytes Relative 01/22/2020 5     Eosinophils Relative 01/22/2020 0     Basophils Relative 01/22/2020 0     Neutrophils Absolute 01/22/2020 14 17*    Immature Grans Absolute 01/22/2020 0 12     Lymphocytes Absolute 01/22/2020 0 88     Monocytes Absolute 01/22/2020 0 76     Eosinophils Absolute 01/22/2020 0 00     Basophils Absolute 01/22/2020 0 02     Sodium 01/22/2020 136     Potassium 01/22/2020 3 6     Chloride 01/22/2020 101     CO2 01/22/2020 25     ANION GAP 01/22/2020 10     BUN 01/22/2020 7     Creatinine 01/22/2020 0 48*    Glucose 01/22/2020 108     Calcium 01/22/2020 8 9     AST 01/22/2020 24     ALT 01/22/2020 42     Alkaline Phosphatase 01/22/2020 111     Total Protein 01/22/2020 7 1     Albumin 01/22/2020 3 1*    Total Bilirubin 01/22/2020 0 38     eGFR 01/22/2020 92     Magnesium 01/22/2020 2 4     Phosphorus 01/22/2020 3 7     Protime 01/22/2020 13 7     INR 01/22/2020 1 09     PTT 01/22/2020 28     LACTIC ACID 01/23/2020 1 2     Sodium 01/23/2020 140     Potassium 01/23/2020 3 5     Chloride 01/23/2020 104     CO2 01/23/2020 25     ANION GAP 01/23/2020 11     BUN 01/23/2020 6     Creatinine 01/23/2020 0 45*    Glucose 01/23/2020 103     Calcium 01/23/2020 8 5     eGFR 01/23/2020 94     WBC 01/23/2020 15 34*    RBC 01/23/2020 4 52     Hemoglobin 01/23/2020 12 2     Hematocrit 01/23/2020 38 5     MCV 01/23/2020 85     MCH 01/23/2020 27 0     MCHC 01/23/2020 31 7     RDW 01/23/2020 15 8*    MPV 01/23/2020 9 6     Platelets 61/90/9354 187     nRBC 01/23/2020 0     Neutrophils Relative 01/23/2020 85*    Immat GRANS % 01/23/2020 1     Lymphocytes Relative 01/23/2020 9*    Monocytes Relative 01/23/2020 5     Eosinophils Relative 01/23/2020 0     Basophils Relative 01/23/2020 0     Neutrophils Absolute 01/23/2020 13 01*    Immature Grans Absolute 01/23/2020 0 15     Lymphocytes Absolute 01/23/2020 1 36     Monocytes Absolute 01/23/2020 0 80     Eosinophils Absolute 01/23/2020 0 00     Basophils Absolute 01/23/2020 0 02     Ventricular Rate 01/22/2020 115     Atrial Rate 01/22/2020 115     TN Interval 01/22/2020 133     QRSD Interval 01/22/2020 92     QT Interval 01/22/2020 321     QTC Interval 01/22/2020 444     P Axis 01/22/2020 76     QRS Strawn 01/22/2020 -66     T Wave Strawn 01/22/2020 66        Imaging Studies: I have personally reviewed pertinent imaging studies        ---------------------------------------------------  Note Electronically Signed By:    MD Peyman Avila 75 Gastroenterology Fellow PGY-4  PI #: [id number]

## 2020-01-23 NOTE — PROGRESS NOTES
Patient seen by [facility] blessing was provided communion give and patient was anointed       01/23/20 1300   Clinical Encounter Type   Visited With Patient   Routine Visit Introduction   Continue Visiting Yes   Mandaeism Encounters   Mandaeism Needs Prayer   Sacramental Encounters   Communion Patient wants communion   Communion Given Indicator Yes   Sacrament of Sick-Anointing Anointed

## 2020-01-23 NOTE — QUICK NOTE
INTERVAL NOTE:    During routine check noticed to be having new and frequent PVC's on monitor, progressive desaturation despite home oxygen of 2L NC, and lack of drainage from NGT  EGD from outside facility revealed large  amount  of  food in  antrum, upper  gastric  body  and  fundus  She c/o mild dyspnea and discomfort from NGT but otherwise denied any chest pain  She also breathing through her mouth due to congestion from NGT  Her oxygen requirement increased to 6L NC and she also received duoneb treatment with good response  She was transitioned to face tent to help maintain her saturation  Her NGT output significantly low considering degree of obstruction and gastric contents  The aspirated contents were also very inspissated resulting in frequent obstruction of NGT despite intermittent tube irrigation  Her CT scan also positive for significant esophageal dilation extending to cervical portion increasing risks of aspiration  In an effort to decrease or stop possible microaspiration evacuated 500mL of thick and green gastric contents with evidence of undigested material  She requires another EGD soon for hopeful evacuation  considering increasing difficulty due to frequent obstruction with 18 Fr NGT  Exam without distension or pain  Finally, atrial fibrillation with RVR resolved following improvement in oxygen saturation  Although improved, PVC's continued (new since admission)  Labs reviewed and electrolytes replaced  STAT labs ordered to review electrolytes since at risk for Hypokalemic hypochloremic metabolic alkalosis from GOO  Antibiotics from outside facility discontinued since no evidence of infection/pneumonia

## 2020-01-23 NOTE — RESPIRATORY THERAPY NOTE
Pt placed on 35% Face Tent per physcian order    Pt is a mouth breather and SATs consistently drop into the 80's

## 2020-01-23 NOTE — CONSULTS
Consult- Torrie Nascimento 1935, 82 y o  female MRN: [id number]    Unit/Bed#: ICU 11 Encounter: [id number]    Primary Care Provider: No primary care provider on file  Date and time admitted to hospital: 1/22/2020  9:01 PM      Inpatient consult to Neurosurgery  Consult performed by: Salvador Scott PA-C  Consult ordered by: Stefani Parson MD      Imaging personally reviewed with attending 1/23/20 at 7:15am  Patient examined at bedside 1/23/20 at 8:45am    Compression fracture of L4 vertebra [facility])  Assessment & Plan  T12 and L4 compression fracture with no known previous trauma, found incidentally during workup for nausea and vomiting  · Patient admits to 1 month history of back pain, sees chiropractor and wears lumbar brace  · Works as an aide on a school bus  · +tenderness with palpation low lumbar spine, no BLE deficits    Imaging:   · CTA CAP w/contrast, 1/21/20: Mild superior endplate depression at K63 new since prior exam   Moderate superior endplate compression fracture of L4, progressed since prior exam    Plan:  · TLSO brace as needed when abdominal pain improved   · Upright xrays with brace on when able  · Lumbar spine precautions  · PT/OT with brace on when able  · Pain control per primary team  · DVT ppx: SCDs, pharm ppx per primary team  · No neurosurgical intervention is anticipated    Neurosurgery will continue to follow after upright xrays are obtained  Please don't hesitate to contact us with any additional questions or concerns  * Gastric outlet obstruction  Assessment & Plan  Managed by primary team  · NPO  · NG tube  · GI consult for possible EGD today    History of Present Illness     HPI: Torrie Nascimento is a 82y o  year old female with PMH including emphysema, diverticulosis, COPD, colitis, Afib on Eliquis who presents with complaint of nausea, vomiting, and abdominal distension   She initially presented to [address] [place] [address],[address] and was then transferred to [facility] for further intervention  She was diagnosed with gastric outlet syndrome, NG tube was placed, and GI has been consulted  During her workup there was an incidental finding of T12 and L4 compression fractures  She denies any previous trauma, but has had back pain for one month  She uses a back brace and follows with a chiropractor  She does work as an aide on a school bus, and sits through a lot of jolting  The L4 fracture was known on previous imaging but not treated and has progressed slightly  She denies any BLE pain, weakness, numbness, or tingling  She denies urinary incontinence  Review of Systems   Constitutional: Negative  HENT: Negative  Eyes: Negative  Respiratory: Positive for shortness of breath  Negative for chest tightness  Cardiovascular: Negative  Gastrointestinal: Positive for abdominal distention, abdominal pain and nausea  Endocrine: Negative  Genitourinary: Negative  Musculoskeletal: Positive for back pain  Skin: Negative  Allergic/Immunologic: Negative  Neurological: Negative  Hematological: Negative  Psychiatric/Behavioral: Negative  Historical Information   Past Medical History:   Diagnosis Date    Asthma     Atrial fibrillation     Blurred vision     Cardiac disease     Colitis     COPD (chronic obstructive pulmonary disease)     Diverticulosis     DJD (degenerative joint disease)     Emphysema lung     Gait abnormality      Past Surgical History:   Procedure Laterality Date    BLADDER SURGERY      COLON SURGERY      COLONOSCOPY W/ POLYPECTOMY N/A 1/21/2019    Procedure: COLONOSCOPY W/ POLYPECTOMY;  Surgeon: Sonya Navarro MD;  Location: [facility] GI [facility];   Service: General    SMALL INTESTINE SURGERY       Social History     Substance and Sexual Activity   Alcohol Use Not Currently    Frequency: Never    Binge frequency: Never     Social History     Substance and Sexual Activity   Drug Use No     Social History     Tobacco Use   Smoking Status Former Smoker    Last attempt to quit: 2013    Years since quittin 1   Smokeless Tobacco Never Used     Family History   Problem Relation Age of Onset    Heart disease Mother        Meds/Allergies   all current active meds have been reviewed, current meds:   Current Facility-Administered Medications   Medication Dose Route Frequency    acetaminophen (TYLENOL) rectal suppository 650 mg  650 mg Rectal Q6H PRN    dextran 70-hypromellose (GENTEAL TEARS) 0 1-0 3 % ophthalmic solution 1 drop  1 drop Both Eyes TID    heparin (porcine) subcutaneous injection 5,000 Units  5,000 Units Subcutaneous Q8H [facility]    HYDROmorphone (DILAUDID) injection 0 2 mg  0 2 mg Intravenous Q4H PRN    HYDROmorphone (DILAUDID) injection 0 4 mg  0 4 mg Intravenous Q3H PRN    ipratropium-albuterol (DUO-NEB) 0 5-2 5 mg/3 mL inhalation solution 3 mL  3 mL Nebulization Q6H While awake    levalbuterol (XOPENEX) inhalation solution 0 63 mg  0 63 mg Nebulization Q6H PRN    LORazepam (ATIVAN) 2 mg/mL injection 0 25 mg  0 25 mg Intravenous HS PRN    metoprolol (LOPRESSOR) injection 2 5 mg  2 5 mg Intravenous Q6H    multi-electrolyte (PLASMALYTE-A/ISOLYTE-S PH 7 4) IV solution  75 mL/hr Intravenous Continuous    neomycin-bacitracin-polymyxin b (NEOSPORIN) ointment 1 small application  1 small application Topical Daily    ondansetron (ZOFRAN) injection 4 mg  4 mg Intravenous Q6H PRN    pantoprazole (PROTONIX) injection 40 mg  40 mg Intravenous Q12H [facility]    saliva substitute (MOUTH KOTE) mucosal solution 5 spray  5 spray Mouth/Throat 4x Daily PRN    and PTA meds:   Prior to Admission Medications   Prescriptions Last Dose Informant Patient Reported? Taking?    ALPRAZolam (XANAX) 0 5 mg tablet   Yes No   Sig: Take 0 5 mg by mouth daily at bedtime as needed   Calcium Carb-Cholecalciferol (CALCIUM 1000 + D PO)   Yes No   Sig: Take 1,000 mg by mouth daily   albuterol (PROVENTIL HFA,VENTOLIN HFA) 90 mcg/act inhaler   No No   Sig: Inhale 2 puffs every 6 (six) hours as needed for wheezing or shortness of breath   alendronate (FOSAMAX) 70 mg tablet   Yes No   Sig: Take by mouth every 7 days    apixaban (ELIQUIS) 5 mg   No No   Sig: Take 1 tablet (5 mg total) by mouth 2 (two) times a day   cholestyramine sugar free (QUESTRAN LIGHT) 4 g packet   No No   Sig: Take 1 packet (4 g total) by mouth 2 (two) times a day   diltiazem (CARDIZEM CD) 240 mg 24 hr capsule   No No   Sig: Take 1 capsule (240 mg total) by mouth daily   fluticasone-umeclidinium-vilanterol (TRELEGY ELLIPTA) 100-62 5-25 MCG/INH inhaler   Yes No   Si puff daily   ipratropium-albuterol (DUO-NEB) 0 5-2 5 mg/3 mL nebulizer solution   No No   Sig: Take 1 vial (3 mL total) by nebulization every 6 (six) hours while awake   iron polysaccharides (FERREX) 150 mg capsule   No No   Sig: Take 1 capsule (150 mg total) by mouth 2 (two) times a day   loperamide (IMODIUM) 2 mg capsule   No No   Sig: Take 1 capsule (2 mg total) by mouth 3 (three) times a day as needed for diarrhea   metoprolol tartrate (LOPRESSOR) 25 mg tablet   No No   Sig: Take 0 5 tablets (12 5 mg total) by mouth every 12 (twelve) hours   montelukast (SINGULAIR) 10 mg tablet   No No   Sig: Take 1 tablet (10 mg total) by mouth daily at bedtime   pantoprazole (PROTONIX) 40 mg tablet   No No   Sig: Take 1 tablet (40 mg total) by mouth daily in the early morning   potassium chloride (MICRO-K) 10 MEQ CR capsule   No No   Sig: Take 1 capsule (10 mEq total) by mouth daily   psyllium (METAMUCIL) packet   No No   Sig: Take 1 packet by mouth 3 (three) times a day      Facility-Administered Medications: None     Allergies   Allergen Reactions    Fluticasone        Objective   I/O        0700  0700  07    I V  (mL/kg)  947 1 (15 2)     IV Piggyback  175     Total Intake(mL/kg)  1122 1 (18)     Urine (mL/kg/hr)  1275     Emesis/NG output  650     Total Output  1925     Net  -802 9 Physical Exam   Constitutional: She is oriented to person, place, and time  She appears well-developed and well-nourished  HENT:   Head: Normocephalic and atraumatic  NG tube in place  NC in place   Eyes: Pupils are equal, round, and reactive to light  EOM are normal    Neck: Normal range of motion  Cardiovascular: Normal rate  Pulmonary/Chest: Effort normal  No respiratory distress  Abdominal: She exhibits distension  Musculoskeletal: Normal range of motion  Neurological: She is alert and oriented to person, place, and time  She has normal strength  She has a normal Finger-Nose-Finger Test    Reflex Scores:       Tricep reflexes are 2+ on the right side and 2+ on the left side  Bicep reflexes are 2+ on the right side and 2+ on the left side  Brachioradialis reflexes are 2+ on the right side and 2+ on the left side  Patellar reflexes are 2+ on the right side and 2+ on the left side  Achilles reflexes are 2+ on the right side and 2+ on the left side  Skin: Skin is warm and dry  Psychiatric: She has a normal mood and affect  Her speech is normal and behavior is normal  Judgment and thought content normal    Nursing note and vitals reviewed  Neurologic Exam     Mental Status   Oriented to person, place, and time  Recall at 5 minutes: recalls 3 of 3 objects  Follows 2 step commands  Attention: normal  Concentration: normal    Speech: speech is normal   Level of consciousness: alert  Knowledge: good  Able to perform simple calculations  Able to name object  Able to repeat  Normal comprehension  Cranial Nerves   Cranial nerves II through XII intact  CN III, IV, VI   Pupils are equal, round, and reactive to light  Extraocular motions are normal      Motor Exam   Muscle bulk: normal  Overall muscle tone: normal  Right arm pronator drift: absent  Left arm pronator drift: absent    Strength   Strength 5/5 throughout       Sensory Exam   Light touch normal    Pinprick normal    DST and JPS intact bilaterally  TTP low lumbar spine     Gait, Coordination, and Reflexes     Coordination   Finger to nose coordination: normal    Tremor   Resting tremor: absent    Reflexes   Right brachioradialis: 2+  Left brachioradialis: 2+  Right biceps: 2+  Left biceps: 2+  Right triceps: 2+  Left triceps: 2+  Right patellar: 2+  Left patellar: 2+  Right achilles: 2+  Left achilles: 2+  Right : 2+  Left : 2+  Right Callaway: absent  Left Callaway: absent  Right ankle clonus: absent  Left ankle clonus: absent        Vitals:Blood pressure 93/61, pulse (!) 110, temperature 98 8 °F (37 1 °C), resp  rate 21, height 5' (1 524 m), weight 62 4 kg (137 lb 9 1 oz), SpO2 96 %, not currently breastfeeding  ,Body mass index is 26 87 kg/m²  Lab Results:   Results from last 7 days   Lab Units 01/23/20  0500 01/22/20 2144 01/22/20  0502   WBC Thousand/uL 15 34* 15 95* 6 40   HEMOGLOBIN g/dL 12 2 12 3 12 3   HEMATOCRIT % 38 5 38 9 39 7*   PLATELETS Thousands/uL 187 235 199   NEUTROS PCT % 85* 88* 72   MONOS PCT % 5 5 11     Results from last 7 days   Lab Units 01/23/20  0459 01/22/20 2144 01/22/20  0502 01/21/20  0156   POTASSIUM mmol/L 3 5 3 6 4 1 3 4*   CHLORIDE mmol/L 104 101 101 99   CO2 mmol/L 25 25 25 28   BUN mg/dL 6 7 7 12   CREATININE mg/dL 0 45* 0 48* 0 48* 0 50*   CALCIUM mg/dL 8 5 8 9 8 9 9 3   ALK PHOS U/L  --  111 73 81   ALT U/L  --  42 26 26   AST U/L  --  24 21 16     Results from last 7 days   Lab Units 01/22/20  2144 01/21/20  0156   MAGNESIUM mg/dL 2 4 2 5     Results from last 7 days   Lab Units 01/22/20  2144   PHOSPHORUS mg/dL 3 7     Results from last 7 days   Lab Units 01/22/20  2144   INR  1 09   PTT seconds 28     No results found for: TROPONINT  ABG:No results found for: PHART, BHY1PEF, PO2ART, KTY4GGL, K8ALZSAX, BEART, SOURCE    Imaging Studies: I have personally reviewed pertinent reports     and I have personally reviewed pertinent films in PACS     Cta Chest Ct Abdomen Pelvis W Contrast    Result Date: 1/21/2020  Narrative: CT PULMONARY ANGIOGRAM OF THE CHEST AND CT ABDOMEN AND PELVIS WITH INTRAVENOUS CONTRAST INDICATION:   PE suspected, high pretest prob  Vomiting  Abdominal distention COMPARISON:  CT of abdomen pelvis on October 2, 2019  TECHNIQUE:  CT examination of the chest, abdomen and pelvis was performed  Thin section CT angiographic technique was used in the chest in order to evaluate for pulmonary embolus and coronal 3D MIP postprocessing was performed on the acquisition scanner  Axial, sagittal, and coronal 2D reformatted images were created from the source data and submitted for interpretation  Radiation dose length product (DLP) for this visit:  691 8 mGy-cm   This examination, like all CT scans performed in the [facility], was performed utilizing techniques to minimize radiation dose exposure, including the use of iterative reconstruction and automated exposure control  IV Contrast:  100 mL of iohexol (OMNIPAQUE)  was administered intravenously without immediate adverse reaction  Enteric Contrast:  Enteric contrast was not administered  FINDINGS: CHEST PULMONARY ARTERIAL TREE:  No evidence of pulmonary embolism  LUNGS:  Emphysematous changes of the lungs  No focal consolidation  Small scattered calcified granulomas  The central airways are patent  PLEURA:  Unremarkable  HEART/AORTA:  Unremarkable for patient's age  MEDIASTINUM AND TONY:  A precarinal lymph node measures 1 cm in short axis, nonspecific  CHEST WALL AND LOWER NECK: 1 2 cm right thyroid nodule  Incidental discovery of one or more thyroid nodule(s) measuring less than 1 5 cm and without suspicious features is noted in this patient who is above 28years old; according to guidelines published in the February 2015 white paper on incidental thyroid nodules in the Journal of the [employer] of Radiology [facility]), no further evaluation is recommended   ABDOMEN LIVER/BILIARY TREE:  Hepatic steatosis  GALLBLADDER:  No calcified gallstones  No pericholecystic inflammatory change  SPLEEN:  Unremarkable  PANCREAS:  Atrophic fatty replaced pancreas  ADRENAL GLANDS:  Unremarkable  KIDNEYS/URETERS:  One or more simple renal cyst(s) is noted  Otherwise unremarkable kidneys  No hydronephrosis  STOMACH AND BOWEL:  Esophagus is distended with fluid  The stomach is distended with fluid and food debris  The duodenum and remainder of the small bowel is normal in caliber  APPENDIX:  No findings to suggest appendicitis  ABDOMINOPELVIC CAVITY:  No ascites or free intraperitoneal air  No lymphadenopathy  VESSELS:  Mild atherosclerotic calcifications  PELVIS REPRODUCTIVE ORGANS:  Unremarkable for patient's age  URINARY BLADDER:  Unremarkable  ABDOMINAL WALL/INGUINAL REGIONS:  Fat-containing ventral abdominal wall hernia  OSSEOUS STRUCTURES:  No acute fracture or destructive osseous lesion  Mild superior endplate depression at N26 new since prior examination  Moderate superior endplate compression fracture at L4, progressed  Impression: 1   ev nc f pulmonary embolism  2   Emphysematous changes of lungs without focal consolidation  3   The stomach is distended with fluid and food debris  The duodenum and remainder of the small bowel is normal in caliber  Gastric outlet obstruction is not excluded  The esophagus is distended with fluid, consider aspiration precautions  4   Mild superior endplate depression at W12 new since prior exam   Moderate superior endplate compression fracture of L4, progressed since prior exam        Workstation performed: [id number]     EKG, Pathology, and Other Studies: I have personally reviewed pertinent reports        VTE Prophylaxis: Sequential compression device (Venodyne)  and Heparin    Code Status: Level 1 - Full Code  Advance Directive and Living Will: Yes    Power of :    POLST:      Counseling / Coordination of Care  I spent 45 minutes with the patient

## 2020-01-23 NOTE — RESPIRATORY THERAPY NOTE
RT Protocol Note  Adonay Chatman 82 y o  female MRN: [id number]  Unit/Bed#: ICU 11 Encounter: [id number]    Assessment    Active Problems:    * No active hospital problems  *      Home Pulmonary Medications:  Albuterol mdi duoneb  Home Devices/Therapy: Home O2(2-3L O2)    Past Medical History:   Diagnosis Date    Asthma     Atrial fibrillation     Blurred vision     Cardiac disease     Colitis     COPD (chronic obstructive pulmonary disease)     Diverticulosis     DJD (degenerative joint disease)     Emphysema lung     Gait abnormality      Social History     Socioeconomic History    Marital status:       Spouse name: Not on file    Number of children: Not on file    Years of education: Not on file    Highest education level: Not on file   Occupational History    Not on file   Social Needs    Financial resource strain: Not on file    Food insecurity:     Worry: Not on file     Inability: Not on file    Transportation needs:     Medical: Not on file     Non-medical: Not on file   Tobacco Use    Smoking status: Former Smoker     Last attempt to quit: 2013     Years since quittin 1    Smokeless tobacco: Never Used   Substance and Sexual Activity    Alcohol use: Not Currently     Frequency: Never     Binge frequency: Never    Drug use: No    Sexual activity: Not Currently   Lifestyle    Physical activity:     Days per week: Not on file     Minutes per session: Not on file    Stress: Not on file   Relationships    Social connections:     Talks on phone: Not on file     Gets together: Not on file     Attends [employer] service: Not on file     Active member of club or organization: Not on file     Attends meetings of clubs or organizations: Not on file     Relationship status: Not on file    Intimate partner violence:     Fear of current or ex partner: Not on file     Emotionally abused: Not on file     Physically abused: Not on file     Forced sexual activity: Not on file Other Topics Concern    Not on file   Social History Narrative    Not on file       Subjective         Objective    Physical Exam:   Assessment Type: Pre-treatment  General Appearance: Alert, Awake  Respiratory Pattern: Tachypneic  Chest Assessment: Chest expansion symmetrical  Bilateral Breath Sounds: Clear    Vitals:  Blood pressure 118/65, pulse 102, temperature 99 1 °F (37 3 °C), temperature source Oral, resp  rate (!) 26, height 5' (1 524 m), weight 62 4 kg (137 lb 9 1 oz), SpO2 93 %, not currently breastfeeding  Imaging and other studies: I have personally reviewed pertinent reports  Plan    Respiratory Plan: Home Bronchodilator Patient pathway        Resp Comments: Pt assesed per Respiratory Protocol  Pt  has hx of Copd and takes meds at home  Pt also states she wears 2-3L O2 at home  Will keep pt on on home regimen

## 2020-01-23 NOTE — PROGRESS NOTES
Serenity [age] y o  female MRN: [id number]  Unit/Bed#: ICU 11 Encounter: [id number]      -------------------------------------------------------------------------------------------------------------  Chief Complaint: Nausea, vomiting, abdominal pain    History of Present Illness   Sharri Caicedo is a 82 y o  female who presents to [facility]  emergency department on 1/21,  complaining of abdominal pain, nausea, vomiting, and chest pain  She was worked up and found to have gastric outlet obstruction on CT scan of the abdomen  Patient was admitted to the medical-surgical floor under telemetry  atient was seen by Dr Bret Cobos who preformed an EGD on 1/22  EGD revealed a gastric outlet obstruction with large amount of food present in the antrum as well as the upper gastric body and fundus  Dr Avelino Osorio tried to aspirate, but feels that patient has much more than just gastric outlet obstruction  With patient's comorbidities, he recommended the patient to be transferred to [facility]  The patient was accepted by Dr Suresh Lynch and started on IV Zosyn for suspicion for possible perforation/sepsis  History obtained from chart review  -------------------------------------------------------------------------------------------------------------  Assessment and Plan:    Neuro:    Analgesia  o Dilaudid 0 2 Moderate PRN q4  o Dilaudid 0 4 Severe PRN q3  o Acetaminophen 650mg PRN   Anxiety  o Ativan 0 25 IV qHS   Monitor CAM ICU daily   Regulate Sleep/Wake      CV:    Diagnosis: Atrial Fibrillation, HLD  o Plan:   o Metoprolol 2 5mg q6  o Hold Eliquis for surgical procedure    o Hold home Cardizem CD 240mg, lopressor, and Cholestyramine 4g packet      Pulm:   Diagnosis: Emphysema, COPD, Asthma  o Plan:   - Continue Proventil HFA 2 puffs q6 PRN,   - To substitute for home Trellegy   Start Breo 1 puff daily with Spiriva   - Hold home singular      GI:  Gastric Obstruction  o Continue NGT to LIS  o Continue NPO  o Per surgery, GI consult in the AM for possible repeat EGD   Diagnosis: GERD  o Plan:   - Continue home Protonix IV 40mg q12      :   o No acute issues  o Creat 0 48/BUN 7      F/E/N:    Plan:   o NPO until surgery assesses  o Continue to monitor electrolytes and replace as needed    o Isolyte @ 125      Heme/Onc:    No acute issues      Endo:    No acute issues      ID:    Diagnosis: Suspicion for possible perforation/sepsis  o Plan: Zosyn 3 375g q6  o Continue to monitor fever and WBCs      MSK/Skin:    Diagnosis: LLE Laceration  o Plan:   - Continue applying neosporin  - Wound care daily   Assist with turning and repositioning q2      Disposition: Continue Critical Care   Code Status: Level 1 - Full Code  --------------------------------------------------------------------------------------------------------------  Review of Systems   Constitutional: Positive for activity change and appetite change  Negative for chills, diaphoresis, fatigue and fever  HENT: Negative  Eyes: Negative  Respiratory: Positive for shortness of breath  Negative for apnea, cough, choking, chest tightness, wheezing and stridor  Describes that she feels like she can not take a deep breath because her abdomen feels full   Cardiovascular: Negative  Gastrointestinal: Positive for abdominal distention  Negative for abdominal pain, anal bleeding, blood in stool, constipation, diarrhea, nausea, rectal pain and vomiting  States that she was vomiting prior to NGT   Endocrine: Negative  Genitourinary: Negative  Musculoskeletal: Negative  Skin: Negative  Allergic/Immunologic: Negative  Neurological: Negative  Hematological: Negative  Psychiatric/Behavioral: Negative  Physical Exam   Constitutional: She is oriented to person, place, and time  She appears well-developed and well-nourished  No distress     HENT:   Head: Normocephalic and atraumatic  Eyes: Pupils are equal, round, and reactive to light  EOM are normal    Neck: Normal range of motion  Neck supple  Cardiovascular: Regular rhythm, normal heart sounds and normal pulses  Tachycardia present  Pulses:       Posterior tibial pulses are 2+ on the right side, and 2+ on the left side  Pulmonary/Chest: Effort normal  She has decreased breath sounds  Abdominal: She exhibits distension  Bowel sounds are absent  There is no tenderness  Neurological: She is alert and oriented to person, place, and time  She has normal strength  No cranial nerve deficit or sensory deficit  Skin: Skin is warm and dry  Capillary refill takes less than 2 seconds  She is not diaphoretic  Psychiatric: She has a normal mood and affect  Her speech is normal and behavior is normal  Judgment and thought content normal  Cognition and memory are normal      --------------------------------------------------------------------------------------------------------------  Vitals:   Vitals:    01/22/20 1953   BP: 96/64   Pulse: (!) 124   Resp: (!) 24   Temp: 99 1 °F (37 3 °C)   TempSrc: Oral   SpO2: 92%   Weight: 62 4 kg (137 lb 9 1 oz)   Height: 5' (1 524 m)     Temp  Min: 96 9 °F (36 1 °C)  Max: 99 6 °F (37 6 °C)  IBW: 45 5 kg  Height: 5' (152 4 cm)  Body mass index is 26 87 kg/m²        Laboratory and Diagnostics:  Results from last 7 days   Lab Units 01/22/20  2144 01/22/20  0502 01/21/20  0156   WBC Thousand/uL 15 95* 6 40 8 20   HEMOGLOBIN g/dL 12 3 12 3 13 0   HEMATOCRIT % 38 9 39 7* 40 8*   PLATELETS Thousands/uL 235 199 232   NEUTROS PCT % 88* 72 79*   MONOS PCT % 5 11 9     Results from last 7 days   Lab Units 01/22/20  0502 01/21/20  0156   SODIUM mmol/L 135 133*   POTASSIUM mmol/L 4 1 3 4*   CHLORIDE mmol/L 101 99   CO2 mmol/L 25 28   ANION GAP mmol/L 9 6   BUN mg/dL 7 12   CREATININE mg/dL 0 48* 0 50*   CALCIUM mg/dL 8 9 9 3   GLUCOSE RANDOM mg/dL 99 147*   ALT U/L 26 26   AST U/L 21 16 ALK PHOS U/L 73 81   ALBUMIN g/dL 3 9 4 1   TOTAL BILIRUBIN mg/dL 0 30 0 20     Results from last 7 days   Lab Units 01/21/20  0156   MAGNESIUM mg/dL 2 5           Results from last 7 days   Lab Units 01/21/20  0156   TROPONIN I ng/mL <0 03         ABG:    VBG:          Micro:        EKG: Reviewed  Imaging: I have personally reviewed pertinent reports  1/21: CTA Abd/pelvis: The stomach is distended with fluid and food debris  The duodenum and remainder of the small bowel is normal in caliber  Gastric outlet obstruction is not excluded  The esophagus is distended with fluid, consider aspiration precautions  1/22: EGD: Gastric outlet obstruction with large amount of food present in the antrum as well as the upper gastric body and fundus  An NG tube was seen which had been placed placed that had aspirated all liquid from the stomach  Gastric mucosa revealed a mild to  moderate gastritis  Esophageal mucosa revealed a mild to moderate reflux esophagitis  The scope could not be passed to the pylorus so it was unclear what was obstructing the stomach  Historical Information   Past Medical History:   Diagnosis Date    Asthma     Atrial fibrillation     Blurred vision     Cardiac disease     Colitis     COPD (chronic obstructive pulmonary disease)     Diverticulosis     DJD (degenerative joint disease)     Emphysema lung     Gait abnormality      Past Surgical History:   Procedure Laterality Date    BLADDER SURGERY      COLON SURGERY      COLONOSCOPY W/ POLYPECTOMY N/A 1/21/2019    Procedure: COLONOSCOPY W/ POLYPECTOMY;  Surgeon: Joel Stockton MD;  Location: [facility] GI [facility];   Service: General    SMALL INTESTINE SURGERY       Social History   Social History     Substance and Sexual Activity   Alcohol Use Not Currently    Frequency: Never    Binge frequency: Never     Social History     Substance and Sexual Activity   Drug Use No     Social History     Tobacco Use   Smoking Status Former Smoker  Last attempt to quit: 2013    Years since quittin 1   Smokeless Tobacco Never Used      Family History   Problem Relation Age of Onset    Heart disease Mother      I have reviewed this patient's family history and commented on sigificant items within the HPI      Medications:  Current Facility-Administered Medications   Medication Dose Route Frequency    acetaminophen (TYLENOL) rectal suppository 650 mg  650 mg Rectal Q6H PRN    albuterol (PROVENTIL HFA,VENTOLIN HFA) inhaler 2 puff  2 puff Inhalation Q6H PRN    dextran 70-hypromellose (GENTEAL TEARS) 0 1-0 3 % ophthalmic solution 1 drop  1 drop Both Eyes TID    [START ON 2020] enoxaparin (LOVENOX) subcutaneous injection 40 mg  40 mg Subcutaneous Daily    HYDROmorphone (DILAUDID) injection 0 2 mg  0 2 mg Intravenous Q4H PRN    HYDROmorphone (DILAUDID) injection 0 4 mg  0 4 mg Intravenous Q3H PRN    ipratropium-albuterol (DUO-NEB) 0 5-2 5 mg/3 mL inhalation solution 3 mL  3 mL Nebulization Q6H While awake    LORazepam (ATIVAN) 2 mg/mL injection 0 25 mg  0 25 mg Intravenous HS PRN    metoprolol (LOPRESSOR) injection 2 5 mg  2 5 mg Intravenous Q6H    multi-electrolyte (ISOLYTE-S PH 7 4) bolus 1,000 mL  1,000 mL Intravenous Once    multi-electrolyte (PLASMALYTE-A/ISOLYTE-S PH 7 4) IV solution  125 mL/hr Intravenous Continuous    [START ON 2020] neomycin-bacitracin-polymyxin b (NEOSPORIN) ointment 1 small application  1 small application Topical Daily    ondansetron (ZOFRAN) injection 4 mg  4 mg Intravenous Q6H PRN    pantoprazole (PROTONIX) injection 40 mg  40 mg Intravenous Q12H [address]    saliva substitute (MOUTH KOTE) mucosal solution 5 spray  5 spray Mouth/Throat 4x Daily PRN     Home medications:  Prior to Admission Medications   Prescriptions Last Dose Informant Patient Reported? Taking?    ALPRAZolam (XANAX) 0 5 mg tablet   Yes No   Sig: Take 0 5 mg by mouth daily at bedtime as needed   Calcium Carb-Cholecalciferol (CALCIUM 1000 + D PO)   Yes No   Sig: Take 1,000 mg by mouth daily   albuterol (PROVENTIL HFA,VENTOLIN HFA) 90 mcg/act inhaler   No No   Sig: Inhale 2 puffs every 6 (six) hours as needed for wheezing or shortness of breath   alendronate (FOSAMAX) 70 mg tablet   Yes No   Sig: Take by mouth every 7 days    apixaban (ELIQUIS) 5 mg   No No   Sig: Take 1 tablet (5 mg total) by mouth 2 (two) times a day   cholestyramine sugar free (QUESTRAN LIGHT) 4 g packet   No No   Sig: Take 1 packet (4 g total) by mouth 2 (two) times a day   diltiazem (CARDIZEM CD) 240 mg 24 hr capsule   No No   Sig: Take 1 capsule (240 mg total) by mouth daily   fluticasone-umeclidinium-vilanterol (TRELEGY ELLIPTA) 100-62 5-25 MCG/INH inhaler   Yes No   Si puff daily   ipratropium-albuterol (DUO-NEB) 0 5-2 5 mg/3 mL nebulizer solution   No No   Sig: Take 1 vial (3 mL total) by nebulization every 6 (six) hours while awake   iron polysaccharides (FERREX) 150 mg capsule   No No   Sig: Take 1 capsule (150 mg total) by mouth 2 (two) times a day   loperamide (IMODIUM) 2 mg capsule   No No   Sig: Take 1 capsule (2 mg total) by mouth 3 (three) times a day as needed for diarrhea   metoprolol tartrate (LOPRESSOR) 25 mg tablet   No No   Sig: Take 0 5 tablets (12 5 mg total) by mouth every 12 (twelve) hours   montelukast (SINGULAIR) 10 mg tablet   No No   Sig: Take 1 tablet (10 mg total) by mouth daily at bedtime   pantoprazole (PROTONIX) 40 mg tablet   No No   Sig: Take 1 tablet (40 mg total) by mouth daily in the early morning   potassium chloride (MICRO-K) 10 MEQ CR capsule   No No   Sig: Take 1 capsule (10 mEq total) by mouth daily   psyllium (METAMUCIL) packet   No No   Sig: Take 1 packet by mouth 3 (three) times a day      Facility-Administered Medications: None     Allergies:   Allergies   Allergen Reactions    Fluticasone      ------------------------------------------------------------------------------------------------------------  Advance Directive and Living Will: Yes    Power of :    POLST:    ------------------------------------------------------------------------------------------------------------  Anticipated Length of Stay is > 2 midnights    Counseling / Coordination of Care  Total Critical Care time spent 40 minutes excluding procedures, teaching and family updates  LEEROY Garcia        Portions of the record may have been created with voice recognition software  Occasional wrong word or "sound a like" substitutions may have occurred due to the inherent limitations of voice recognition software    Read the chart carefully and recognize, using context, where substitutions have occurred

## 2020-01-23 NOTE — ASSESSMENT & PLAN NOTE
T12 and L4 compression fracture with no known previous trauma, found incidentally during workup for nausea and vomiting  · Patient admits to 1 month history of back pain, sees chiropractor and wears lumbar brace  · Works as an aide on a school bus    Imaging:   · XR thoracolumbar spine 2/13/2020:  Stable T12 and L4 compression fracture  · TLICS 1    Plan:  · TLSO brace as needed for now while abdominal wound heals, ideally would like to have on any time patient is OOB and HOB > 45 degrees, to some degree abdominal pain is distracting from back pain  Patient states she is unable to put brace on due to pain but is willing to wear it once everything heals on her abdomen  · Lumbar spine precautions  · Pain control per primary team  · DVT ppx: SCDs, heparin    Neurosurgery will follow as needed during hospitalization  No surgical intervention warranted at this time  Outpatient follow up in 2 weeks with repeat upright thoracolumbar imaging - recommend orthotic tech visit to readjust brace at that time  Signed off, please call with questions or concerns

## 2020-01-23 NOTE — SOCIAL WORK
CM met pt at bedside, introduce self and made aware of CM role at dc  Pt reported that he has a LW and POA  POA is her friend/ Tabitha Palacio  Primary contact is friend Panchokenan Wilkins  CM reviewed with pt medical records from previous admission  Pt's POA documents is in her medical chart  Pt reports she lives alone in a mobile home with 3 SAVANNAH  Pt is independent with ADLs, does not drive and employed  Pt does not use any AD but has a RW at home if needed  Pt has home oxygen 2L through [address], has [employer]  Pt's friend Bronson Yaya or another friend helps transport pt to her appts      Pt PCP is Dr Keya Faust  Pt uses [facility] for medications and denies any barriers to obtaining them  Pt has had [facility] and [facility] in the past for Dilma 80  Pt has also been to [facility] for STR in the past    Pt denies any history of MH and D&A treatment  CM reviewed d/c planning process including the following: identifying help at home, patient preference for d/c planning needs, Discharge Lounge, [facility] Meds to Bed program, availability of treatment team to discuss questions or concerns patient and/or family may have regarding understanding medications and recognizing signs and symptoms once discharged  CM also encouraged patient to follow up with all recommended appointments after discharge  Patient advised of importance for patient and family to participate in managing patients medical well being

## 2020-01-24 ENCOUNTER — APPOINTMENT (INPATIENT)
Dept: RADIOLOGY | Facility: HOSPITAL | Age: 85
DRG: 326 | End: 2020-01-24
Payer: MEDICARE

## 2020-01-24 LAB
ANION GAP SERPL CALCULATED.3IONS-SCNC: 10 MMOL/L (ref 4–13)
BASOPHILS # BLD AUTO: 0.02 THOUSANDS/ΜL (ref 0–0.1)
BASOPHILS NFR BLD AUTO: 0 % (ref 0–1)
BUN SERPL-MCNC: 6 MG/DL (ref 5–25)
CALCIUM SERPL-MCNC: 8.7 MG/DL (ref 8.3–10.1)
CHLORIDE SERPL-SCNC: 103 MMOL/L (ref 100–108)
CO2 SERPL-SCNC: 22 MMOL/L (ref 21–32)
CREAT SERPL-MCNC: 0.33 MG/DL (ref 0.6–1.3)
EOSINOPHIL # BLD AUTO: 0.14 THOUSAND/ΜL (ref 0–0.61)
EOSINOPHIL NFR BLD AUTO: 1 % (ref 0–6)
ERYTHROCYTE [DISTWIDTH] IN BLOOD BY AUTOMATED COUNT: 15.6 % (ref 11.6–15.1)
GFR SERPL CREATININE-BSD FRML MDRD: 104 ML/MIN/1.73SQ M
GLUCOSE SERPL-MCNC: 89 MG/DL (ref 65–140)
HCT VFR BLD AUTO: 36.7 % (ref 34.8–46.1)
HGB BLD-MCNC: 11.2 G/DL (ref 11.5–15.4)
IMM GRANULOCYTES # BLD AUTO: 0.14 THOUSAND/UL (ref 0–0.2)
IMM GRANULOCYTES NFR BLD AUTO: 1 % (ref 0–2)
LYMPHOCYTES # BLD AUTO: 1.17 THOUSANDS/ΜL (ref 0.6–4.47)
LYMPHOCYTES NFR BLD AUTO: 8 % (ref 14–44)
MAGNESIUM SERPL-MCNC: 2.2 MG/DL (ref 1.6–2.6)
MCH RBC QN AUTO: 26.2 PG (ref 26.8–34.3)
MCHC RBC AUTO-ENTMCNC: 30.5 G/DL (ref 31.4–37.4)
MCV RBC AUTO: 86 FL (ref 82–98)
MONOCYTES # BLD AUTO: 0.82 THOUSAND/ΜL (ref 0.17–1.22)
MONOCYTES NFR BLD AUTO: 6 % (ref 4–12)
NEUTROPHILS # BLD AUTO: 11.91 THOUSANDS/ΜL (ref 1.85–7.62)
NEUTS SEG NFR BLD AUTO: 84 % (ref 43–75)
NRBC BLD AUTO-RTO: 0 /100 WBCS
PHOSPHATE SERPL-MCNC: 3 MG/DL (ref 2.3–4.1)
PLATELET # BLD AUTO: 202 THOUSANDS/UL (ref 149–390)
PMV BLD AUTO: 10.1 FL (ref 8.9–12.7)
POTASSIUM SERPL-SCNC: 4.4 MMOL/L (ref 3.5–5.3)
RBC # BLD AUTO: 4.28 MILLION/UL (ref 3.81–5.12)
SODIUM SERPL-SCNC: 135 MMOL/L (ref 136–145)
WBC # BLD AUTO: 14.2 THOUSAND/UL (ref 4.31–10.16)

## 2020-01-24 PROCEDURE — 72100 X-RAY EXAM L-S SPINE 2/3 VWS: CPT

## 2020-01-24 PROCEDURE — C9113 INJ PANTOPRAZOLE SODIUM, VIA: HCPCS | Performed by: PHYSICIAN ASSISTANT

## 2020-01-24 PROCEDURE — 83735 ASSAY OF MAGNESIUM: CPT | Performed by: PHYSICIAN ASSISTANT

## 2020-01-24 PROCEDURE — 84100 ASSAY OF PHOSPHORUS: CPT | Performed by: PHYSICIAN ASSISTANT

## 2020-01-24 PROCEDURE — 94640 AIRWAY INHALATION TREATMENT: CPT

## 2020-01-24 PROCEDURE — 80048 BASIC METABOLIC PNL TOTAL CA: CPT | Performed by: PHYSICIAN ASSISTANT

## 2020-01-24 PROCEDURE — 99232 SBSQ HOSP IP/OBS MODERATE 35: CPT | Performed by: SURGERY

## 2020-01-24 PROCEDURE — 94760 N-INVAS EAR/PLS OXIMETRY 1: CPT

## 2020-01-24 PROCEDURE — 85025 COMPLETE CBC W/AUTO DIFF WBC: CPT | Performed by: PHYSICIAN ASSISTANT

## 2020-01-24 RX ORDER — DEXTROSE, SODIUM CHLORIDE, AND POTASSIUM CHLORIDE 5; .45; .15 G/100ML; G/100ML; G/100ML
100 INJECTION INTRAVENOUS CONTINUOUS
Status: DISCONTINUED | OUTPATIENT
Start: 2020-01-24 | End: 2020-02-01

## 2020-01-24 RX ORDER — ALBUTEROL SULFATE 2.5 MG/3ML
2.5 SOLUTION RESPIRATORY (INHALATION) EVERY 4 HOURS PRN
Status: DISCONTINUED | OUTPATIENT
Start: 2020-01-24 | End: 2020-02-08

## 2020-01-24 RX ORDER — LEVALBUTEROL 1.25 MG/.5ML
1.25 SOLUTION, CONCENTRATE RESPIRATORY (INHALATION)
Status: DISCONTINUED | OUTPATIENT
Start: 2020-01-24 | End: 2020-02-14 | Stop reason: HOSPADM

## 2020-01-24 RX ADMIN — IPRATROPIUM BROMIDE 0.5 MG: 0.5 SOLUTION RESPIRATORY (INHALATION) at 19:34

## 2020-01-24 RX ADMIN — HYDROMORPHONE HYDROCHLORIDE 0.2 MG: 1 INJECTION, SOLUTION INTRAMUSCULAR; INTRAVENOUS; SUBCUTANEOUS at 20:03

## 2020-01-24 RX ADMIN — PANTOPRAZOLE SODIUM 40 MG: 40 INJECTION, POWDER, FOR SOLUTION INTRAVENOUS at 23:13

## 2020-01-24 RX ADMIN — HYDROMORPHONE HYDROCHLORIDE 0.5 MG: 1 INJECTION, SOLUTION INTRAMUSCULAR; INTRAVENOUS; SUBCUTANEOUS at 00:20

## 2020-01-24 RX ADMIN — HYDROMORPHONE HYDROCHLORIDE 0.2 MG: 1 INJECTION, SOLUTION INTRAMUSCULAR; INTRAVENOUS; SUBCUTANEOUS at 12:06

## 2020-01-24 RX ADMIN — LORAZEPAM 0.25 MG: 2 INJECTION INTRAMUSCULAR; INTRAVENOUS at 23:15

## 2020-01-24 RX ADMIN — HYDROMORPHONE HYDROCHLORIDE 0.2 MG: 1 INJECTION, SOLUTION INTRAMUSCULAR; INTRAVENOUS; SUBCUTANEOUS at 06:34

## 2020-01-24 RX ADMIN — IPRATROPIUM BROMIDE AND ALBUTEROL SULFATE 3 ML: 2.5; .5 SOLUTION RESPIRATORY (INHALATION) at 07:27

## 2020-01-24 RX ADMIN — METOPROLOL TARTRATE 2.5 MG: 5 INJECTION INTRAVENOUS at 03:52

## 2020-01-24 RX ADMIN — DEXTRAN 70 AND HYPROMELLOSE 2910 1 DROP: 1; 3 SOLUTION/ DROPS OPHTHALMIC at 17:21

## 2020-01-24 RX ADMIN — DEXTROSE, SODIUM CHLORIDE, AND POTASSIUM CHLORIDE 100 ML/HR: 5; .45; .15 INJECTION INTRAVENOUS at 07:00

## 2020-01-24 RX ADMIN — LACTULOSE 200 G: 10 SOLUTION ORAL; RECTAL at 12:34

## 2020-01-24 RX ADMIN — HYDROMORPHONE HYDROCHLORIDE 0.2 MG: 1 INJECTION, SOLUTION INTRAMUSCULAR; INTRAVENOUS; SUBCUTANEOUS at 02:51

## 2020-01-24 RX ADMIN — SODIUM CHLORIDE, SODIUM GLUCONATE, SODIUM ACETATE, POTASSIUM CHLORIDE, MAGNESIUM CHLORIDE, SODIUM PHOSPHATE, DIBASIC, AND POTASSIUM PHOSPHATE 75 ML/HR: .53; .5; .37; .037; .03; .012; .00082 INJECTION, SOLUTION INTRAVENOUS at 02:54

## 2020-01-24 RX ADMIN — BISACODYL 10 MG: 10 SUPPOSITORY RECTAL at 09:01

## 2020-01-24 RX ADMIN — METOPROLOL TARTRATE 2.5 MG: 5 INJECTION INTRAVENOUS at 23:14

## 2020-01-24 RX ADMIN — DEXTRAN 70 AND HYPROMELLOSE 2910 1 DROP: 1; 3 SOLUTION/ DROPS OPHTHALMIC at 23:14

## 2020-01-24 RX ADMIN — HEPARIN SODIUM 5000 UNITS: 5000 INJECTION INTRAVENOUS; SUBCUTANEOUS at 23:13

## 2020-01-24 RX ADMIN — METOPROLOL TARTRATE 2.5 MG: 5 INJECTION INTRAVENOUS at 17:21

## 2020-01-24 RX ADMIN — HYDROMORPHONE HYDROCHLORIDE 0.5 MG: 1 INJECTION, SOLUTION INTRAMUSCULAR; INTRAVENOUS; SUBCUTANEOUS at 04:15

## 2020-01-24 RX ADMIN — DEXTROSE, SODIUM CHLORIDE, AND POTASSIUM CHLORIDE 100 ML/HR: 5; .45; .15 INJECTION INTRAVENOUS at 17:32

## 2020-01-24 RX ADMIN — BACITRACIN ZINC, NEOMYCIN SULFATE, AND POLYMYXIN B SULFATE 1 SMALL APPLICATION: 400; 3.5; 5 OINTMENT TOPICAL at 09:01

## 2020-01-24 RX ADMIN — IPRATROPIUM BROMIDE AND ALBUTEROL SULFATE 3 ML: 2.5; .5 SOLUTION RESPIRATORY (INHALATION) at 13:01

## 2020-01-24 RX ADMIN — HEPARIN SODIUM 5000 UNITS: 5000 INJECTION INTRAVENOUS; SUBCUTANEOUS at 05:33

## 2020-01-24 RX ADMIN — PANTOPRAZOLE SODIUM 40 MG: 40 INJECTION, POWDER, FOR SOLUTION INTRAVENOUS at 09:01

## 2020-01-24 RX ADMIN — LEVALBUTEROL HYDROCHLORIDE 1.25 MG: 1.25 SOLUTION, CONCENTRATE RESPIRATORY (INHALATION) at 19:34

## 2020-01-24 RX ADMIN — DEXTRAN 70 AND HYPROMELLOSE 2910 1 DROP: 1; 3 SOLUTION/ DROPS OPHTHALMIC at 09:02

## 2020-01-24 RX ADMIN — METOPROLOL TARTRATE 2.5 MG: 5 INJECTION INTRAVENOUS at 09:01

## 2020-01-24 RX ADMIN — HEPARIN SODIUM 5000 UNITS: 5000 INJECTION INTRAVENOUS; SUBCUTANEOUS at 17:21

## 2020-01-24 RX ADMIN — LACTULOSE 200 G: 10 SOLUTION ORAL; RECTAL at 17:22

## 2020-01-24 NOTE — PLAN OF CARE
Problem: Prexisting or High Potential for Compromised Skin Integrity  Goal: Skin integrity is maintained or improved  Description  INTERVENTIONS:  - Identify patients at risk for skin breakdown  - Assess and monitor skin integrity  - Assess and monitor nutrition and hydration status  - Monitor labs   - Assess for incontinence   - Turn and reposition patient  - Assist with mobility/ambulation  - Relieve pressure over bony prominences  - Avoid friction and shearing  - Provide appropriate hygiene as needed including keeping skin clean and dry  - Evaluate need for skin moisturizer/barrier cream  - Collaborate with interdisciplinary team   - Patient/family teaching  - Consider wound care consult   Outcome: Progressing     Problem: Potential for Falls  Goal: Patient will remain free of falls  Description  INTERVENTIONS:  - Assess patient frequently for physical needs  -  Identify cognitive and physical deficits and behaviors that affect risk of falls    -  [place] fall precautions as indicated by assessment   - Educate patient/family on patient safety including physical limitations  - Instruct patient to call for assistance with activity based on assessment  - Modify environment to reduce risk of injury  - Consider OT/PT consult to assist with strengthening/mobility  Outcome: Progressing     Problem: PAIN - ADULT  Goal: Verbalizes/displays adequate comfort level or baseline comfort level  Description  Interventions:  - Encourage patient to monitor pain and request assistance  - Assess pain using appropriate pain scale  - Administer analgesics based on type and severity of pain and evaluate response  - Implement non-pharmacological measures as appropriate and evaluate response  - Consider cultural and social influences on pain and pain management  - Notify physician/advanced practitioner if interventions unsuccessful or patient reports new pain  Outcome: Progressing     Problem: INFECTION - ADULT  Goal: Absence or prevention of progression during hospitalization  Description  INTERVENTIONS:  - Assess and monitor for signs and symptoms of infection  - Monitor lab/diagnostic results  - Monitor all insertion sites, i e  indwelling lines, tubes, and drains  - Monitor endotracheal if appropriate and nasal secretions for changes in amount and color  - [place] appropriate cooling/warming therapies per order  - Administer medications as ordered  - Instruct and encourage patient and family to use good hand hygiene technique  - Identify and instruct in appropriate isolation precautions for identified infection/condition  Outcome: Progressing  Goal: Absence of fever/infection during neutropenic period  Description  INTERVENTIONS:  - Monitor WBC    Outcome: Progressing     Problem: SAFETY ADULT  Goal: Patient will remain free of falls  Description  INTERVENTIONS:  - Assess patient frequently for physical needs  -  Identify cognitive and physical deficits and behaviors that affect risk of falls    -  [place] fall precautions as indicated by assessment   - Educate patient/family on patient safety including physical limitations  - Instruct patient to call for assistance with activity based on assessment  - Modify environment to reduce risk of injury  - Consider OT/PT consult to assist with strengthening/mobility  Outcome: Progressing  Goal: Maintain or return to baseline ADL function  Description  INTERVENTIONS:  -  Assess patient's ability to carry out ADLs; assess patient's baseline for ADL function and identify physical deficits which impact ability to perform ADLs (bathing, care of mouth/teeth, toileting, grooming, dressing, etc )  - Assess/evaluate cause of self-care deficits   - Assess range of motion  - Assess patient's mobility; develop plan if impaired  - Assess patient's need for assistive devices and provide as appropriate  - Encourage maximum independence but intervene and supervise when necessary  - Involve family in performance of ADLs  - Assess for home care needs following discharge   - Consider OT consult to assist with ADL evaluation and planning for discharge  - Provide patient education as appropriate  Outcome: Progressing  Goal: Maintain or return mobility status to optimal level  Description  INTERVENTIONS:  - Assess patient's baseline mobility status (ambulation, transfers, stairs, etc )    - Identify cognitive and physical deficits and behaviors that affect mobility  - Identify mobility aids required to assist with transfers and/or ambulation (gait belt, sit-to-stand, lift, walker, cane, etc )  - [place] fall precautions as indicated by assessment  - Record patient progress and toleration of activity level on Mobility SBAR; progress patient to next Phase/Stage  - Instruct patient to call for assistance with activity based on assessment  - Consider rehabilitation consult to assist with strengthening/weightbearing, etc   Outcome: Progressing     Problem: DISCHARGE PLANNING  Goal: Discharge to home or other facility with appropriate resources  Description  INTERVENTIONS:  - Identify barriers to discharge w/patient and caregiver  - Arrange for needed discharge resources and transportation as appropriate  - Identify discharge learning needs (meds, wound care, etc )  - Arrange for interpretive services to assist at discharge as needed  - Refer to Case Management Department for coordinating discharge planning if the patient needs post-hospital services based on physician/advanced practitioner order or complex needs related to functional status, cognitive ability, or social support system  Outcome: Progressing     Problem: Knowledge Deficit  Goal: Patient/family/caregiver demonstrates understanding of disease process, treatment plan, medications, and discharge instructions  Description  Complete learning assessment and assess knowledge base    Interventions:  - Provide teaching at level of understanding  - Provide teaching via preferred learning methods  Outcome: Progressing     Problem: RESPIRATORY - ADULT  Goal: Achieves optimal ventilation and oxygenation  Description  INTERVENTIONS:  - Assess for changes in respiratory status  - Assess for changes in mentation and behavior  - Position to facilitate oxygenation and minimize respiratory effort  - Oxygen administered by appropriate delivery if ordered  - Initiate smoking cessation education as indicated  - Encourage broncho-pulmonary hygiene including cough, deep breathe, Incentive Spirometry  - Assess the need for suctioning and aspirate as needed  - Assess and instruct to report SOB or any respiratory difficulty  - Respiratory Therapy support as indicated  Outcome: Progressing     Problem: GASTROINTESTINAL - ADULT  Goal: Minimal or absence of nausea and/or vomiting  Description  INTERVENTIONS:  - Administer IV fluids if ordered to ensure adequate hydration  - Maintain NPO status until nausea and vomiting are resolved  - Nasogastric tube if ordered  - Administer ordered antiemetic medications as needed  - Provide nonpharmacologic comfort measures as appropriate  - Advance diet as tolerated, if ordered  - Consider nutrition services referral to assist patient with adequate nutrition and appropriate food choices  Outcome: Progressing  Goal: Maintains or returns to baseline bowel function  Description  INTERVENTIONS:  - Assess bowel function  - Encourage oral fluids to ensure adequate hydration  - Administer IV fluids if ordered to ensure adequate hydration  - Administer ordered medications as needed  - Encourage mobilization and activity  - Consider nutritional services referral to assist patient with adequate nutrition and appropriate food choices  Outcome: Progressing  Goal: Maintains adequate nutritional intake  Description  INTERVENTIONS:  - Monitor percentage of each meal consumed  - Identify factors contributing to decreased intake, treat as appropriate  - Assist with meals as needed  - Monitor I&O, weight, and lab values if indicated  - Obtain nutrition services referral as needed  Outcome: Progressing     Problem: METABOLIC, FLUID AND ELECTROLYTES - ADULT  Goal: Electrolytes maintained within normal limits  Description  INTERVENTIONS:  - Monitor labs and assess patient for signs and symptoms of electrolyte imbalances  - Administer electrolyte replacement as ordered  - Monitor response to electrolyte replacements, including repeat lab results as appropriate  - Instruct patient on fluid and nutrition as appropriate  Outcome: Progressing  Goal: Fluid balance maintained  Description  INTERVENTIONS:  - Monitor labs   - Monitor I/O and WT  - Instruct patient on fluid and nutrition as appropriate  - Assess for signs & symptoms of volume excess or deficit  Outcome: Progressing     Problem: MUSCULOSKELETAL - ADULT  Goal: Maintain or return mobility to safest level of function  Description  INTERVENTIONS:  - Assess patient's ability to carry out ADLs; assess patient's baseline for ADL function and identify physical deficits which impact ability to perform ADLs (bathing, care of mouth/teeth, toileting, grooming, dressing, etc )  - Assess/evaluate cause of self-care deficits   - Assess range of motion  - Assess patient's mobility  - Assess patient's need for assistive devices and provide as appropriate  - Encourage maximum independence but intervene and supervise when necessary  - Involve family in performance of ADLs  - Assess for home care needs following discharge   - Consider OT consult to assist with ADL evaluation and planning for discharge  - Provide patient education as appropriate  Outcome: Progressing  Goal: Maintain proper alignment of affected body part  Description  INTERVENTIONS:  - Support, maintain and protect limb and body alignment  - Provide patient/ family with appropriate education  Outcome: Progressing     Problem: Nutrition/Hydration-ADULT  Goal: Nutrient/Hydration intake appropriate for improving, restoring or maintaining nutritional needs  Description  Monitor and assess patient's nutrition/hydration status for malnutrition  Collaborate with interdisciplinary team and initiate plan and interventions as ordered  Monitor patient's weight and dietary intake as ordered or per policy  Utilize nutrition screening tool and intervene as necessary  Determine patient's food preferences and provide high-protein, high-caloric foods as appropriate       INTERVENTIONS:  - Monitor oral intake, urinary output, labs, and treatment plans  - Assess nutrition and hydration status and recommend course of action  - Evaluate amount of meals eaten  - Assist patient with eating if necessary   - Allow adequate time for meals  - Recommend/ encourage appropriate diets, oral nutritional supplements, and vitamin/mineral supplements  - Order, calculate, and assess calorie counts as needed  - Recommend, monitor, and adjust tube feedings and TPN/PPN based on assessed needs  - Assess need for intravenous fluids  - Provide specific nutrition/hydration education as appropriate  - Include patient/family/caregiver in decisions related to nutrition  Outcome: Progressing     Problem: COPING  Goal: Pt/Family able to verbalize concerns and demonstrate effective coping strategies  Description  INTERVENTIONS:  - Assist patient/family to identify coping skills, available support systems and cultural and spiritual values  - Provide emotional support, including active listening and acknowledgement of concerns of patient and caregivers  - Reduce environmental stimuli, as able  - Provide patient education  - Assess for spiritual pain/suffering and initiate spiritual care, including notification of Pastoral Care or [place] based community as needed  - Assess effectiveness of coping strategies  Outcome: Progressing  Goal: Will report anxiety at manageable levels  Description  INTERVENTIONS:  - Administer medication as ordered  - Teach and encourage coping skills  - Provide emotional support  - Assess patient/family for anxiety and ability to cope  Outcome: Progressing

## 2020-01-24 NOTE — PROGRESS NOTES
Progress Note - General Surgery   Joe Barrios 82 y o  female MRN: [id number]  Unit/Bed#: [facility] 802-01 Encounter: [id number]    Assessment:  82 y o  F who presented with a gastric outlet obstruction secondary to unknown etiology - possible Malignancy vs peptic stricture  Acute on chronic respiratory failure, resolving  Now on [facility] - home O2     1/21 EGD: GOO w large amount of food present in the antrum as well as the upper gastric body and fundus  NGT has aspirated all liquid from the stomach  Gastric mucosa w mild to moderate gastritis  Esophageal mucosa w mild to moderate reflux esophagitis  Unable to pass scope through the pylorus so it was unclear what was obstructing the stomach  NGT w 175 cc bilious output in 24 hrs    Plan:  NPO/NGT  [email or web address]  GI following- no role for repeat endoscopy at this time -> aggressive bowel regimen  Continue Protonix   OOB/Ambulate  DVT ppx      Subjective/Objective   Subjective:   Patient states she is uncomfortable with the NGT in place  States her nausea has resolved  Denies abdominal pain  Last BM was 1/21  Unsure if she is passing flatus  Denies any other complaints at this time  Objective:     Blood pressure 123/79, pulse 104, temperature 98 5 °F (36 9 °C), resp  rate 22, height 5' (1 524 m), weight 62 4 kg (137 lb 9 1 oz), SpO2 95 %, not currently breastfeeding  ,Body mass index is 26 87 kg/m²        Intake/Output Summary (Last 24 hours) at 1/24/2020 3233  Last data filed at 1/24/2020 0545  Gross per 24 hour   Intake 1797 5 ml   Output 2475 ml   Net -677 5 ml       Invasive Devices     Peripheral Intravenous Line            Peripheral IV 01/22/20 Right Hand 1 day    Peripheral IV 01/23/20 Left;Ventral (anterior) Forearm less than 1 day          Drain            NG/OG/Enteral Tube Nasogastric 16 Fr Right nares 2 days                Physical Exam:   NAD, alert and oriented x3  Normocephalic, atraumatic  MMM, EOMI, PERRLA, NGT in place  Norm resp effort on 2LNC  Rate controlled Afib   Abd soft, NT/ND  No calf tenderness or peripheral edema  Motor/sensation intact in distal extremities  CN grossly intact  -rash/lesions        Lab, Imaging and other studies:  I have personally reviewed pertinent lab results    , CBC:   Lab Results   Component Value Date    WBC 14 20 (H) 01/24/2020    HGB 11 2 (L) 01/24/2020    HCT 36 7 01/24/2020    MCV 86 01/24/2020     01/24/2020    MCH 26 2 (L) 01/24/2020    MCHC 30 5 (L) 01/24/2020    RDW 15 6 (H) 01/24/2020    MPV 10 1 01/24/2020    NRBC 0 01/24/2020   , CMP:   No results found for: SODIUM, K, CL, CO2, ANIONGAP, BUN, CREATININE, GLUCOSE, CALCIUM, AST, ALT, ALKPHOS, PROT, BILITOT, EGFR, Coagulation:   No results found for: PT, INR, APTT, Urinalysis: No results found for: COLORU, CLARITYU, SPECGRAV, PHUR, LEUKOCYTESUR, NITRITE, PROTEINUA, GLUCOSEU, KETONESU, BILIRUBINUR, BLOODU  VTE Pharmacologic Prophylaxis: Sequential compression device (Venodyne)   VTE Mechanical Prophylaxis: sequential compression device

## 2020-01-24 NOTE — ORTHOTIC NOTE
Orthotic Note            Date: 1/24/2020      Patient Name: Lakeshia Rasmussen            Reason for Consult:  Patient Active Problem List   Diagnosis    COPD (chronic obstructive pulmonary disease) ([facility] [address] )    Cardiac disease    Diverticulosis of intestine with bleeding    Diarrhea    Colorectal polyps    Effusion of bursa of left elbow    Cardiomegaly    Chronic anxiety    Chronic cystitis    Chronic low back pain    Congestive heart failure ([facility] [address] )    Deviated nasal septum    Diverticulosis of colon    Gastroesophageal reflux disease without esophagitis    Gout    History of angioedema    History of colonic polyps    Lumbar radiculopathy    Macular degeneration of both eyes    Obesity    Osteoporosis    Other specified forms of chronic ischemic heart disease    Seasonal allergies    Panic attack    Vocal cord dysfunction    Colitis presumed infectious    Atrial fibrillation with rapid ventricular response (HCC)    Left elbow pain    Chronic respiratory failure with hypoxia ([facility])    Debility    Positive culture findings in sputum    Compression fracture of L4 vertebra ([facility])    Gastric outlet obstruction    Closed wedge compression fracture of T12 vertebra ([facility] [address] )   [employer] delivered and fit an [employer] TLSO onto pt while sitting EOB  Sized and measured pt to a size XL for optimal fit and comfort  Pt tolerated fitting well  Educated pt on proper donning, doffing, and cleaning instructions  Pt currently without questions or concerns at this time  RN present at time of fitting and will continue to follow up daily  Recommendations:  Please call [employer] ext [phone] in regards to any bracing instructions and/or adjustments       [address] [facility] Restorative Technician, BS

## 2020-01-24 NOTE — PLAN OF CARE
Problem: Prexisting or High Potential for Compromised Skin Integrity  Goal: Skin integrity is maintained or improved  Description  INTERVENTIONS:  - Identify patients at risk for skin breakdown  - Assess and monitor skin integrity  - Assess and monitor nutrition and hydration status  - Monitor labs   - Assess for incontinence   - Turn and reposition patient  - Assist with mobility/ambulation  - Relieve pressure over bony prominences  - Avoid friction and shearing  - Provide appropriate hygiene as needed including keeping skin clean and dry  - Evaluate need for skin moisturizer/barrier cream  - Collaborate with interdisciplinary team   - Patient/family teaching  - Consider wound care consult   Outcome: Progressing     Problem: Potential for Falls  Goal: Patient will remain free of falls  Description  INTERVENTIONS:  - Assess patient frequently for physical needs  -  Identify cognitive and physical deficits and behaviors that affect risk of falls    -  [place] fall precautions as indicated by assessment   - Educate patient/family on patient safety including physical limitations  - Instruct patient to call for assistance with activity based on assessment  - Modify environment to reduce risk of injury  - Consider OT/PT consult to assist with strengthening/mobility  Outcome: Progressing     Problem: PAIN - ADULT  Goal: Verbalizes/displays adequate comfort level or baseline comfort level  Description  Interventions:  - Encourage patient to monitor pain and request assistance  - Assess pain using appropriate pain scale  - Administer analgesics based on type and severity of pain and evaluate response  - Implement non-pharmacological measures as appropriate and evaluate response  - Consider cultural and social influences on pain and pain management  - Notify physician/advanced practitioner if interventions unsuccessful or patient reports new pain  Outcome: Progressing     Problem: INFECTION - ADULT  Goal: Absence or prevention of progression during hospitalization  Description  INTERVENTIONS:  - Assess and monitor for signs and symptoms of infection  - Monitor lab/diagnostic results  - Monitor all insertion sites, i e  indwelling lines, tubes, and drains  - Monitor endotracheal if appropriate and nasal secretions for changes in amount and color  - [place] appropriate cooling/warming therapies per order  - Administer medications as ordered  - Instruct and encourage patient and family to use good hand hygiene technique  - Identify and instruct in appropriate isolation precautions for identified infection/condition  Outcome: Progressing  Goal: Absence of fever/infection during neutropenic period  Description  INTERVENTIONS:  - Monitor WBC    Outcome: Progressing     Problem: SAFETY ADULT  Goal: Patient will remain free of falls  Description  INTERVENTIONS:  - Assess patient frequently for physical needs  -  Identify cognitive and physical deficits and behaviors that affect risk of falls    -  [place] fall precautions as indicated by assessment   - Educate patient/family on patient safety including physical limitations  - Instruct patient to call for assistance with activity based on assessment  - Modify environment to reduce risk of injury  - Consider OT/PT consult to assist with strengthening/mobility  Outcome: Progressing  Goal: Maintain or return to baseline ADL function  Description  INTERVENTIONS:  -  Assess patient's ability to carry out ADLs; assess patient's baseline for ADL function and identify physical deficits which impact ability to perform ADLs (bathing, care of mouth/teeth, toileting, grooming, dressing, etc )  - Assess/evaluate cause of self-care deficits   - Assess range of motion  - Assess patient's mobility; develop plan if impaired  - Assess patient's need for assistive devices and provide as appropriate  - Encourage maximum independence but intervene and supervise when necessary  - Involve family in performance of ADLs  - Assess for home care needs following discharge   - Consider OT consult to assist with ADL evaluation and planning for discharge  - Provide patient education as appropriate  Outcome: Progressing  Goal: Maintain or return mobility status to optimal level  Description  INTERVENTIONS:  - Assess patient's baseline mobility status (ambulation, transfers, stairs, etc )    - Identify cognitive and physical deficits and behaviors that affect mobility  - Identify mobility aids required to assist with transfers and/or ambulation (gait belt, sit-to-stand, lift, walker, cane, etc )  - [place] fall precautions as indicated by assessment  - Record patient progress and toleration of activity level on Mobility SBAR; progress patient to next Phase/Stage  - Instruct patient to call for assistance with activity based on assessment  - Consider rehabilitation consult to assist with strengthening/weightbearing, etc   Outcome: Progressing     Problem: DISCHARGE PLANNING  Goal: Discharge to home or other facility with appropriate resources  Description  INTERVENTIONS:  - Identify barriers to discharge w/patient and caregiver  - Arrange for needed discharge resources and transportation as appropriate  - Identify discharge learning needs (meds, wound care, etc )  - Arrange for interpretive services to assist at discharge as needed  - Refer to Case Management Department for coordinating discharge planning if the patient needs post-hospital services based on physician/advanced practitioner order or complex needs related to functional status, cognitive ability, or social support system  Outcome: Progressing     Problem: Knowledge Deficit  Goal: Patient/family/caregiver demonstrates understanding of disease process, treatment plan, medications, and discharge instructions  Description  Complete learning assessment and assess knowledge base    Interventions:  - Provide teaching at level of understanding  - Provide teaching via preferred learning methods  Outcome: Progressing     Problem: RESPIRATORY - ADULT  Goal: Achieves optimal ventilation and oxygenation  Description  INTERVENTIONS:  - Assess for changes in respiratory status  - Assess for changes in mentation and behavior  - Position to facilitate oxygenation and minimize respiratory effort  - Oxygen administered by appropriate delivery if ordered  - Initiate smoking cessation education as indicated  - Encourage broncho-pulmonary hygiene including cough, deep breathe, Incentive Spirometry  - Assess the need for suctioning and aspirate as needed  - Assess and instruct to report SOB or any respiratory difficulty  - Respiratory Therapy support as indicated  Outcome: Progressing     Problem: GASTROINTESTINAL - ADULT  Goal: Minimal or absence of nausea and/or vomiting  Description  INTERVENTIONS:  - Administer IV fluids if ordered to ensure adequate hydration  - Maintain NPO status until nausea and vomiting are resolved  - Nasogastric tube if ordered  - Administer ordered antiemetic medications as needed  - Provide nonpharmacologic comfort measures as appropriate  - Advance diet as tolerated, if ordered  - Consider nutrition services referral to assist patient with adequate nutrition and appropriate food choices  Outcome: Progressing  Goal: Maintains or returns to baseline bowel function  Description  INTERVENTIONS:  - Assess bowel function  - Encourage oral fluids to ensure adequate hydration  - Administer IV fluids if ordered to ensure adequate hydration  - Administer ordered medications as needed  - Encourage mobilization and activity  - Consider nutritional services referral to assist patient with adequate nutrition and appropriate food choices  Outcome: Progressing  Goal: Maintains adequate nutritional intake  Description  INTERVENTIONS:  - Monitor percentage of each meal consumed  - Identify factors contributing to decreased intake, treat as appropriate  - Assist with meals as needed  - Monitor I&O, weight, and lab values if indicated  - Obtain nutrition services referral as needed  Outcome: Progressing     Problem: METABOLIC, FLUID AND ELECTROLYTES - ADULT  Goal: Electrolytes maintained within normal limits  Description  INTERVENTIONS:  - Monitor labs and assess patient for signs and symptoms of electrolyte imbalances  - Administer electrolyte replacement as ordered  - Monitor response to electrolyte replacements, including repeat lab results as appropriate  - Instruct patient on fluid and nutrition as appropriate  Outcome: Progressing  Goal: Fluid balance maintained  Description  INTERVENTIONS:  - Monitor labs   - Monitor I/O and WT  - Instruct patient on fluid and nutrition as appropriate  - Assess for signs & symptoms of volume excess or deficit  Outcome: Progressing     Problem: MUSCULOSKELETAL - ADULT  Goal: Maintain or return mobility to safest level of function  Description  INTERVENTIONS:  - Assess patient's ability to carry out ADLs; assess patient's baseline for ADL function and identify physical deficits which impact ability to perform ADLs (bathing, care of mouth/teeth, toileting, grooming, dressing, etc )  - Assess/evaluate cause of self-care deficits   - Assess range of motion  - Assess patient's mobility  - Assess patient's need for assistive devices and provide as appropriate  - Encourage maximum independence but intervene and supervise when necessary  - Involve family in performance of ADLs  - Assess for home care needs following discharge   - Consider OT consult to assist with ADL evaluation and planning for discharge  - Provide patient education as appropriate  Outcome: Progressing  Goal: Maintain proper alignment of affected body part  Description  INTERVENTIONS:  - Support, maintain and protect limb and body alignment  - Provide patient/ family with appropriate education  Outcome: Progressing     Problem: Nutrition/Hydration-ADULT  Goal: Nutrient/Hydration intake appropriate for improving, restoring or maintaining nutritional needs  Description  Monitor and assess patient's nutrition/hydration status for malnutrition  Collaborate with interdisciplinary team and initiate plan and interventions as ordered  Monitor patient's weight and dietary intake as ordered or per policy  Utilize nutrition screening tool and intervene as necessary  Determine patient's food preferences and provide high-protein, high-caloric foods as appropriate       INTERVENTIONS:  - Monitor oral intake, urinary output, labs, and treatment plans  - Assess nutrition and hydration status and recommend course of action  - Evaluate amount of meals eaten  - Assist patient with eating if necessary   - Allow adequate time for meals  - Recommend/ encourage appropriate diets, oral nutritional supplements, and vitamin/mineral supplements  - Order, calculate, and assess calorie counts as needed  - Recommend, monitor, and adjust tube feedings and TPN/PPN based on assessed needs  - Assess need for intravenous fluids  - Provide specific nutrition/hydration education as appropriate  - Include patient/family/caregiver in decisions related to nutrition  Outcome: Progressing     Problem: COPING  Goal: Pt/Family able to verbalize concerns and demonstrate effective coping strategies  Description  INTERVENTIONS:  - Assist patient/family to identify coping skills, available support systems and cultural and spiritual values  - Provide emotional support, including active listening and acknowledgement of concerns of patient and caregivers  - Reduce environmental stimuli, as able  - Provide patient education  - Assess for spiritual pain/suffering and initiate spiritual care, including notification of Pastoral Care or [place] based community as needed  - Assess effectiveness of coping strategies  Outcome: Progressing  Goal: Will report anxiety at manageable levels  Description  INTERVENTIONS:  - Administer medication as ordered  - Teach and encourage coping skills  - Provide emotional support  - Assess patient/family for anxiety and ability to cope  Outcome: Progressing

## 2020-01-24 NOTE — QUICK NOTE
Nurse-Patient-Provider rounds were completed with the patient's nurses today, Lissy Dinh and Hilary Yap  We discussed the plan is to keep her NPO with NG tube for gastric decompression  Awaiting further discussion with GI regarding plan for repeat endoscopy with tentative plan for repeat endoscopy either in the GI lab or in the OR within the next 24 hours  Continue IV fluids for hydration while NPO  Continue Protonix twice daily for gastritis and GI prophylaxis  Continue current analgesic regimen  Continue to monitor labs for leukocytosis and electrolytes; replete electrolytes as needed  Will review upright spine x-rays with Neurosurgery  We reviewed all of the invasive devices/lines/telemetry orders   - Maintain NG tube indefinitely for ongoing gastric decompression until eyelid obstruction resolved  DVT Prophylaxis:  - SCDs and subcutaneous heparin  Pain Assessment / Plan:  - Continue current analgesic regimen  Mobility Assessment / Plan:  - Activity as tolerated  Goals / Barriers for discharge:  - Not yet appropriate for discharge  Anticipate additional intervention with endoscopy in the next 24 hours and possibly later today  - Case management following; case and discharge needs discussed  All questions and concerns were addressed  I spent greater than 14 minutes reviewing the plan with the patient and the nurse, and coordinating care for the day      Adele Muñoz PA-C  1/24/2020 09:27 AM

## 2020-01-25 LAB
ANION GAP SERPL CALCULATED.3IONS-SCNC: 7 MMOL/L (ref 4–13)
BASOPHILS # BLD AUTO: 0.04 THOUSANDS/ΜL (ref 0–0.1)
BASOPHILS NFR BLD AUTO: 0 % (ref 0–1)
BUN SERPL-MCNC: 6 MG/DL (ref 5–25)
CALCIUM SERPL-MCNC: 9.3 MG/DL (ref 8.3–10.1)
CHLORIDE SERPL-SCNC: 103 MMOL/L (ref 100–108)
CO2 SERPL-SCNC: 25 MMOL/L (ref 21–32)
CREAT SERPL-MCNC: 0.47 MG/DL (ref 0.6–1.3)
EOSINOPHIL # BLD AUTO: 0.1 THOUSAND/ΜL (ref 0–0.61)
EOSINOPHIL NFR BLD AUTO: 1 % (ref 0–6)
ERYTHROCYTE [DISTWIDTH] IN BLOOD BY AUTOMATED COUNT: 15.9 % (ref 11.6–15.1)
GFR SERPL CREATININE-BSD FRML MDRD: 92 ML/MIN/1.73SQ M
GLUCOSE SERPL-MCNC: 133 MG/DL (ref 65–140)
HCT VFR BLD AUTO: 41.7 % (ref 34.8–46.1)
HGB BLD-MCNC: 13 G/DL (ref 11.5–15.4)
IMM GRANULOCYTES # BLD AUTO: 0.23 THOUSAND/UL (ref 0–0.2)
IMM GRANULOCYTES NFR BLD AUTO: 1 % (ref 0–2)
LYMPHOCYTES # BLD AUTO: 1.28 THOUSANDS/ΜL (ref 0.6–4.47)
LYMPHOCYTES NFR BLD AUTO: 8 % (ref 14–44)
MCH RBC QN AUTO: 26.2 PG (ref 26.8–34.3)
MCHC RBC AUTO-ENTMCNC: 31.2 G/DL (ref 31.4–37.4)
MCV RBC AUTO: 84 FL (ref 82–98)
MONOCYTES # BLD AUTO: 1.04 THOUSAND/ΜL (ref 0.17–1.22)
MONOCYTES NFR BLD AUTO: 6 % (ref 4–12)
NEUTROPHILS # BLD AUTO: 13.47 THOUSANDS/ΜL (ref 1.85–7.62)
NEUTS SEG NFR BLD AUTO: 84 % (ref 43–75)
NRBC BLD AUTO-RTO: 0 /100 WBCS
PLATELET # BLD AUTO: 272 THOUSANDS/UL (ref 149–390)
PMV BLD AUTO: 10.5 FL (ref 8.9–12.7)
POTASSIUM SERPL-SCNC: 3.7 MMOL/L (ref 3.5–5.3)
RBC # BLD AUTO: 4.96 MILLION/UL (ref 3.81–5.12)
SODIUM SERPL-SCNC: 135 MMOL/L (ref 136–145)
WBC # BLD AUTO: 16.16 THOUSAND/UL (ref 4.31–10.16)

## 2020-01-25 PROCEDURE — C9113 INJ PANTOPRAZOLE SODIUM, VIA: HCPCS | Performed by: PHYSICIAN ASSISTANT

## 2020-01-25 PROCEDURE — 94640 AIRWAY INHALATION TREATMENT: CPT

## 2020-01-25 PROCEDURE — 85025 COMPLETE CBC W/AUTO DIFF WBC: CPT | Performed by: SURGERY

## 2020-01-25 PROCEDURE — 94760 N-INVAS EAR/PLS OXIMETRY 1: CPT

## 2020-01-25 PROCEDURE — 80048 BASIC METABOLIC PNL TOTAL CA: CPT | Performed by: SURGERY

## 2020-01-25 PROCEDURE — 99231 SBSQ HOSP IP/OBS SF/LOW 25: CPT | Performed by: SURGERY

## 2020-01-25 RX ADMIN — DEXTRAN 70 AND HYPROMELLOSE 2910 1 DROP: 1; 3 SOLUTION/ DROPS OPHTHALMIC at 14:43

## 2020-01-25 RX ADMIN — Medication 1 SPRAY: at 15:48

## 2020-01-25 RX ADMIN — Medication 1 SPRAY: at 13:28

## 2020-01-25 RX ADMIN — HYDROMORPHONE HYDROCHLORIDE 0.2 MG: 1 INJECTION, SOLUTION INTRAMUSCULAR; INTRAVENOUS; SUBCUTANEOUS at 08:39

## 2020-01-25 RX ADMIN — IPRATROPIUM BROMIDE 0.5 MG: 0.5 SOLUTION RESPIRATORY (INHALATION) at 07:14

## 2020-01-25 RX ADMIN — IPRATROPIUM BROMIDE 0.5 MG: 0.5 SOLUTION RESPIRATORY (INHALATION) at 19:26

## 2020-01-25 RX ADMIN — LEVALBUTEROL HYDROCHLORIDE 1.25 MG: 1.25 SOLUTION, CONCENTRATE RESPIRATORY (INHALATION) at 07:14

## 2020-01-25 RX ADMIN — METOPROLOL TARTRATE 2.5 MG: 5 INJECTION INTRAVENOUS at 14:56

## 2020-01-25 RX ADMIN — Medication 5 SPRAY: at 08:35

## 2020-01-25 RX ADMIN — LEVALBUTEROL HYDROCHLORIDE 1.25 MG: 1.25 SOLUTION, CONCENTRATE RESPIRATORY (INHALATION) at 13:27

## 2020-01-25 RX ADMIN — BACITRACIN ZINC, NEOMYCIN SULFATE, AND POLYMYXIN B SULFATE 1 SMALL APPLICATION: 400; 3.5; 5 OINTMENT TOPICAL at 08:35

## 2020-01-25 RX ADMIN — DEXTRAN 70 AND HYPROMELLOSE 2910 1 DROP: 1; 3 SOLUTION/ DROPS OPHTHALMIC at 22:05

## 2020-01-25 RX ADMIN — METOPROLOL TARTRATE 2.5 MG: 5 INJECTION INTRAVENOUS at 21:57

## 2020-01-25 RX ADMIN — DEXTROSE, SODIUM CHLORIDE, AND POTASSIUM CHLORIDE 100 ML/HR: 5; .45; .15 INJECTION INTRAVENOUS at 13:29

## 2020-01-25 RX ADMIN — DEXTROSE, SODIUM CHLORIDE, AND POTASSIUM CHLORIDE 100 ML/HR: 5; .45; .15 INJECTION INTRAVENOUS at 23:47

## 2020-01-25 RX ADMIN — HEPARIN SODIUM 5000 UNITS: 5000 INJECTION INTRAVENOUS; SUBCUTANEOUS at 05:46

## 2020-01-25 RX ADMIN — IPRATROPIUM BROMIDE 0.5 MG: 0.5 SOLUTION RESPIRATORY (INHALATION) at 13:27

## 2020-01-25 RX ADMIN — LACTULOSE 200 G: 10 SOLUTION ORAL; RECTAL at 00:05

## 2020-01-25 RX ADMIN — HEPARIN SODIUM 5000 UNITS: 5000 INJECTION INTRAVENOUS; SUBCUTANEOUS at 13:28

## 2020-01-25 RX ADMIN — DEXTRAN 70 AND HYPROMELLOSE 2910 1 DROP: 1; 3 SOLUTION/ DROPS OPHTHALMIC at 08:39

## 2020-01-25 RX ADMIN — BISACODYL 10 MG: 10 SUPPOSITORY RECTAL at 08:35

## 2020-01-25 RX ADMIN — LACTULOSE 200 G: 10 SOLUTION ORAL; RECTAL at 14:44

## 2020-01-25 RX ADMIN — LACTULOSE 200 G: 10 SOLUTION ORAL; RECTAL at 21:59

## 2020-01-25 RX ADMIN — HYDROMORPHONE HYDROCHLORIDE 0.2 MG: 1 INJECTION, SOLUTION INTRAMUSCULAR; INTRAVENOUS; SUBCUTANEOUS at 15:49

## 2020-01-25 RX ADMIN — PANTOPRAZOLE SODIUM 40 MG: 40 INJECTION, POWDER, FOR SOLUTION INTRAVENOUS at 21:54

## 2020-01-25 RX ADMIN — PANTOPRAZOLE SODIUM 40 MG: 40 INJECTION, POWDER, FOR SOLUTION INTRAVENOUS at 08:36

## 2020-01-25 RX ADMIN — HEPARIN SODIUM 5000 UNITS: 5000 INJECTION INTRAVENOUS; SUBCUTANEOUS at 21:54

## 2020-01-25 RX ADMIN — DEXTROSE, SODIUM CHLORIDE, AND POTASSIUM CHLORIDE 100 ML/HR: 5; .45; .15 INJECTION INTRAVENOUS at 01:52

## 2020-01-25 RX ADMIN — LEVALBUTEROL HYDROCHLORIDE 1.25 MG: 1.25 SOLUTION, CONCENTRATE RESPIRATORY (INHALATION) at 19:26

## 2020-01-25 NOTE — PROGRESS NOTES
RN s/w  red surgery team regarding tachycardia and asked if pt should be put on telemetry at this time  NO new orders at this time

## 2020-01-25 NOTE — PROGRESS NOTES
Hr on safety net alarming 152 when into assess pt, apical 88;  pt resting comfortably in bed at this time

## 2020-01-25 NOTE — PLAN OF CARE
Problem: Prexisting or High Potential for Compromised Skin Integrity  Goal: Skin integrity is maintained or improved  Description  INTERVENTIONS:  - Identify patients at risk for skin breakdown  - Assess and monitor skin integrity  - Assess and monitor nutrition and hydration status  - Monitor labs   - Assess for incontinence   - Turn and reposition patient  - Assist with mobility/ambulation  - Relieve pressure over bony prominences  - Avoid friction and shearing  - Provide appropriate hygiene as needed including keeping skin clean and dry  - Evaluate need for skin moisturizer/barrier cream  - Collaborate with interdisciplinary team   - Patient/family teaching  - Consider wound care consult   Outcome: Progressing     Problem: Potential for Falls  Goal: Patient will remain free of falls  Description  INTERVENTIONS:  - Assess patient frequently for physical needs  -  Identify cognitive and physical deficits and behaviors that affect risk of falls    -  [place] fall precautions as indicated by assessment   - Educate patient/family on patient safety including physical limitations  - Instruct patient to call for assistance with activity based on assessment  - Modify environment to reduce risk of injury  - Consider OT/PT consult to assist with strengthening/mobility  Outcome: Progressing     Problem: PAIN - ADULT  Goal: Verbalizes/displays adequate comfort level or baseline comfort level  Description  Interventions:  - Encourage patient to monitor pain and request assistance  - Assess pain using appropriate pain scale  - Administer analgesics based on type and severity of pain and evaluate response  - Implement non-pharmacological measures as appropriate and evaluate response  - Consider cultural and social influences on pain and pain management  - Notify physician/advanced practitioner if interventions unsuccessful or patient reports new pain  Outcome: Progressing     Problem: INFECTION - ADULT  Goal: Absence or prevention of progression during hospitalization  Description  INTERVENTIONS:  - Assess and monitor for signs and symptoms of infection  - Monitor lab/diagnostic results  - Monitor all insertion sites, i e  indwelling lines, tubes, and drains  - Monitor endotracheal if appropriate and nasal secretions for changes in amount and color  - [place] appropriate cooling/warming therapies per order  - Administer medications as ordered  - Instruct and encourage patient and family to use good hand hygiene technique  - Identify and instruct in appropriate isolation precautions for identified infection/condition  Outcome: Progressing  Goal: Absence of fever/infection during neutropenic period  Description  INTERVENTIONS:  - Monitor WBC    Outcome: Progressing     Problem: SAFETY ADULT  Goal: Patient will remain free of falls  Description  INTERVENTIONS:  - Assess patient frequently for physical needs  -  Identify cognitive and physical deficits and behaviors that affect risk of falls    -  [place] fall precautions as indicated by assessment   - Educate patient/family on patient safety including physical limitations  - Instruct patient to call for assistance with activity based on assessment  - Modify environment to reduce risk of injury  - Consider OT/PT consult to assist with strengthening/mobility  Outcome: Progressing  Goal: Maintain or return to baseline ADL function  Description  INTERVENTIONS:  -  Assess patient's ability to carry out ADLs; assess patient's baseline for ADL function and identify physical deficits which impact ability to perform ADLs (bathing, care of mouth/teeth, toileting, grooming, dressing, etc )  - Assess/evaluate cause of self-care deficits   - Assess range of motion  - Assess patient's mobility; develop plan if impaired  - Assess patient's need for assistive devices and provide as appropriate  - Encourage maximum independence but intervene and supervise when necessary  - Involve family in performance of ADLs  - Assess for home care needs following discharge   - Consider OT consult to assist with ADL evaluation and planning for discharge  - Provide patient education as appropriate  Outcome: Progressing  Goal: Maintain or return mobility status to optimal level  Description  INTERVENTIONS:  - Assess patient's baseline mobility status (ambulation, transfers, stairs, etc )    - Identify cognitive and physical deficits and behaviors that affect mobility  - Identify mobility aids required to assist with transfers and/or ambulation (gait belt, sit-to-stand, lift, walker, cane, etc )  - [place] fall precautions as indicated by assessment  - Record patient progress and toleration of activity level on Mobility SBAR; progress patient to next Phase/Stage  - Instruct patient to call for assistance with activity based on assessment  - Consider rehabilitation consult to assist with strengthening/weightbearing, etc   Outcome: Progressing     Problem: DISCHARGE PLANNING  Goal: Discharge to home or other facility with appropriate resources  Description  INTERVENTIONS:  - Identify barriers to discharge w/patient and caregiver  - Arrange for needed discharge resources and transportation as appropriate  - Identify discharge learning needs (meds, wound care, etc )  - Arrange for interpretive services to assist at discharge as needed  - Refer to Case Management Department for coordinating discharge planning if the patient needs post-hospital services based on physician/advanced practitioner order or complex needs related to functional status, cognitive ability, or social support system  Outcome: Progressing     Problem: Knowledge Deficit  Goal: Patient/family/caregiver demonstrates understanding of disease process, treatment plan, medications, and discharge instructions  Description  Complete learning assessment and assess knowledge base    Interventions:  - Provide teaching at level of understanding  - Provide teaching via preferred learning methods  Outcome: Progressing     Problem: RESPIRATORY - ADULT  Goal: Achieves optimal ventilation and oxygenation  Description  INTERVENTIONS:  - Assess for changes in respiratory status  - Assess for changes in mentation and behavior  - Position to facilitate oxygenation and minimize respiratory effort  - Oxygen administered by appropriate delivery if ordered  - Initiate smoking cessation education as indicated  - Encourage broncho-pulmonary hygiene including cough, deep breathe, Incentive Spirometry  - Assess the need for suctioning and aspirate as needed  - Assess and instruct to report SOB or any respiratory difficulty  - Respiratory Therapy support as indicated  Outcome: Progressing     Problem: GASTROINTESTINAL - ADULT  Goal: Minimal or absence of nausea and/or vomiting  Description  INTERVENTIONS:  - Administer IV fluids if ordered to ensure adequate hydration  - Maintain NPO status until nausea and vomiting are resolved  - Nasogastric tube if ordered  - Administer ordered antiemetic medications as needed  - Provide nonpharmacologic comfort measures as appropriate  - Advance diet as tolerated, if ordered  - Consider nutrition services referral to assist patient with adequate nutrition and appropriate food choices  Outcome: Progressing  Goal: Maintains or returns to baseline bowel function  Description  INTERVENTIONS:  - Assess bowel function  - Encourage oral fluids to ensure adequate hydration  - Administer IV fluids if ordered to ensure adequate hydration  - Administer ordered medications as needed  - Encourage mobilization and activity  - Consider nutritional services referral to assist patient with adequate nutrition and appropriate food choices  Outcome: Progressing  Goal: Maintains adequate nutritional intake  Description  INTERVENTIONS:  - Monitor percentage of each meal consumed  - Identify factors contributing to decreased intake, treat as appropriate  - Assist with meals as needed  - Monitor I&O, weight, and lab values if indicated  - Obtain nutrition services referral as needed  Outcome: Progressing     Problem: METABOLIC, FLUID AND ELECTROLYTES - ADULT  Goal: Electrolytes maintained within normal limits  Description  INTERVENTIONS:  - Monitor labs and assess patient for signs and symptoms of electrolyte imbalances  - Administer electrolyte replacement as ordered  - Monitor response to electrolyte replacements, including repeat lab results as appropriate  - Instruct patient on fluid and nutrition as appropriate  Outcome: Progressing  Goal: Fluid balance maintained  Description  INTERVENTIONS:  - Monitor labs   - Monitor I/O and WT  - Instruct patient on fluid and nutrition as appropriate  - Assess for signs & symptoms of volume excess or deficit  Outcome: Progressing     Problem: MUSCULOSKELETAL - ADULT  Goal: Maintain or return mobility to safest level of function  Description  INTERVENTIONS:  - Assess patient's ability to carry out ADLs; assess patient's baseline for ADL function and identify physical deficits which impact ability to perform ADLs (bathing, care of mouth/teeth, toileting, grooming, dressing, etc )  - Assess/evaluate cause of self-care deficits   - Assess range of motion  - Assess patient's mobility  - Assess patient's need for assistive devices and provide as appropriate  - Encourage maximum independence but intervene and supervise when necessary  - Involve family in performance of ADLs  - Assess for home care needs following discharge   - Consider OT consult to assist with ADL evaluation and planning for discharge  - Provide patient education as appropriate  Outcome: Progressing  Goal: Maintain proper alignment of affected body part  Description  INTERVENTIONS:  - Support, maintain and protect limb and body alignment  - Provide patient/ family with appropriate education  Outcome: Progressing     Problem: Nutrition/Hydration-ADULT  Goal: Nutrient/Hydration intake appropriate for improving, restoring or maintaining nutritional needs  Description  Monitor and assess patient's nutrition/hydration status for malnutrition  Collaborate with interdisciplinary team and initiate plan and interventions as ordered  Monitor patient's weight and dietary intake as ordered or per policy  Utilize nutrition screening tool and intervene as necessary  Determine patient's food preferences and provide high-protein, high-caloric foods as appropriate       INTERVENTIONS:  - Monitor oral intake, urinary output, labs, and treatment plans  - Assess nutrition and hydration status and recommend course of action  - Evaluate amount of meals eaten  - Assist patient with eating if necessary   - Allow adequate time for meals  - Recommend/ encourage appropriate diets, oral nutritional supplements, and vitamin/mineral supplements  - Order, calculate, and assess calorie counts as needed  - Recommend, monitor, and adjust tube feedings and TPN/PPN based on assessed needs  - Assess need for intravenous fluids  - Provide specific nutrition/hydration education as appropriate  - Include patient/family/caregiver in decisions related to nutrition  Outcome: Progressing     Problem: COPING  Goal: Pt/Family able to verbalize concerns and demonstrate effective coping strategies  Description  INTERVENTIONS:  - Assist patient/family to identify coping skills, available support systems and cultural and spiritual values  - Provide emotional support, including active listening and acknowledgement of concerns of patient and caregivers  - Reduce environmental stimuli, as able  - Provide patient education  - Assess for spiritual pain/suffering and initiate spiritual care, including notification of Pastoral Care or [place] based community as needed  - Assess effectiveness of coping strategies  Outcome: Progressing  Goal: Will report anxiety at manageable levels  Description  INTERVENTIONS:  - Administer medication as ordered  - Teach and encourage coping skills  - Provide emotional support  - Assess patient/family for anxiety and ability to cope  Outcome: Progressing

## 2020-01-25 NOTE — PROGRESS NOTES
Pt oob in chair refused to wear TLSO brace  Pt made aware of risks and benefits  Drs aware pt is refusing brace per previous shift

## 2020-01-25 NOTE — PROGRESS NOTES
BP 88/62 at present HR 88 Elie Omer made aware from red surgery  Pt c/p not feeling right but unable to describe why  Complaining she wants the NGT out  Will continue to monitor   Pt denies CP, nausea and SOB

## 2020-01-25 NOTE — TREATMENT PLAN
Neurosurgery treatment plan  Compression fracture of T12 and L4 vertebra (HCC)  Assessment & Plan  T12 and L4 compression fracture with no known previous trauma, found incidentally during workup for nausea and vomiting  · Patient admited to 1 month history of back pain, sees chiropractor and wears lumbar brace  · Works as an aide on a school bus     Imaging:   · CTA CAP w/contrast, 1/21/20: Mild superior endplate depression at B91 new since prior exam   Moderate superior endplate compression fracture of L4, progressed since prior exam  · Xray Thoracolumbar spine 1/24/2020: Again noted are the compression deformities involving the L4 and T12 vertebral bodies without change from previous examination  Minimal anterior wedging at L2 is unchanged      Plan:  · TLSO brace as needed when abdominal pain improved   · Thoracolumbar spine precautions  · PT/OT with brace on when able  · Pain control per primary team  · DVT ppx: SCDs, pharm ppx per primary team  · Xray shows stable T12 and L4 compression fx  No neurosurgical intervention is anticipated at this time  · Neurosurgery will see pt PRN during the remainder of her hospitalization  Pt will have follow up with neurosurgery in 2 week with repeat Xray of lumbar spine   Please don't hesitate to contact us with any additional questions or concerns      * Gastric outlet obstruction  Assessment & Plan  Managed by primary team

## 2020-01-25 NOTE — PROGRESS NOTES
Progress Note - General Surgery   Ania Moder 82 y o  female MRN: [id number]  Unit/Bed#: [facility] 802-01 Encounter: [id number]    Assessment:  82 y o  F who presented with a gastric outlet obstruction secondary to unknown etiology - possible Malignancy vs peptic stricture  Acute on chronic respiratory failure, resolving     1/21 EGD: GOO w large amount of food present in the antrum as well as the upper gastric body and fundus  NGT has aspirated all liquid from the stomach  Gastric mucosa w mild to moderate gastritis  Esophageal mucosa w mild to moderate reflux esophagitis  Unable to pass scope through the pylorus so it was unclear what was obstructing the stomach  NGT w 550 cc bilious output in 24 hrs    Plan:  NPO/NGT  [email or web address]  GI following- no role for repeat endoscopy at this time -> aggressive bowel regimen  Continue Protonix   Titrate supplemental O2 to SPO2 >88%  OOB/Ambulate  DVT ppx      Subjective/Objective   Subjective:   Patient is very uncomfortable this morning  States she wants the NGT out and wants to leave  She does say her abdominal pain and nausea have improved  She had two large BMs yesterday  Objective:     Blood pressure (!) 88/62, pulse 85, temperature 97 5 °F (36 4 °C), resp  rate 22, height 5' (1 524 m), weight 62 4 kg (137 lb 9 1 oz), SpO2 92 %, not currently breastfeeding  ,Body mass index is 26 87 kg/m²  Intake/Output Summary (Last 24 hours) at 1/25/2020 0706  Last data filed at 1/25/2020 0548  Gross per 24 hour   Intake 1935 ml   Output 2525 ml   Net -590 ml       Invasive Devices     Peripheral Intravenous Line            Peripheral IV 01/25/20 Dorsal (posterior); Right Hand less than 1 day    Peripheral IV 01/25/20 Right Antecubital less than 1 day          Drain            NG/OG/Enteral Tube Nasogastric 16 Fr Right nares 3 days                Physical Exam:   NAD, alert and oriented x3  Normocephalic, atraumatic  MMM, EOMI, PERRLA, NGT in place  Norm resp effort on 4LNC  Rate controlled Afib   Abd soft, NT/ND  No calf tenderness or peripheral edema  Motor/sensation intact in distal extremities  CN grossly intact  -rash/lesions        Lab, Imaging and other studies:  I have personally reviewed pertinent lab results    , CBC:   Lab Results   Component Value Date    WBC 16 16 (H) 01/25/2020    HGB 13 0 01/25/2020    HCT 41 7 01/25/2020    MCV 84 01/25/2020     01/25/2020    MCH 26 2 (L) 01/25/2020    MCHC 31 2 (L) 01/25/2020    RDW 15 9 (H) 01/25/2020    MPV 10 5 01/25/2020    NRBC 0 01/25/2020   , CMP:   Lab Results   Component Value Date    SODIUM 135 (L) 01/25/2020    K 3 7 01/25/2020     01/25/2020    CO2 25 01/25/2020    BUN 6 01/25/2020    CREATININE 0 47 (L) 01/25/2020    CALCIUM 9 3 01/25/2020    EGFR 92 01/25/2020   , Coagulation:   No results found for: PT, INR, APTT, Urinalysis: No results found for: COLORU, CLARITYU, SPECGRAV, PHUR, LEUKOCYTESUR, NITRITE, PROTEINUA, GLUCOSEU, KETONESU, BILIRUBINUR, BLOODU  VTE Pharmacologic Prophylaxis: Sequential compression device (Venodyne)   VTE Mechanical Prophylaxis: sequential compression device

## 2020-01-26 LAB
ANION GAP SERPL CALCULATED.3IONS-SCNC: 8 MMOL/L (ref 4–13)
BASOPHILS # BLD AUTO: 0.04 THOUSANDS/ΜL (ref 0–0.1)
BASOPHILS NFR BLD AUTO: 0 % (ref 0–1)
BUN SERPL-MCNC: 6 MG/DL (ref 5–25)
CALCIUM SERPL-MCNC: 9 MG/DL (ref 8.3–10.1)
CHLORIDE SERPL-SCNC: 106 MMOL/L (ref 100–108)
CO2 SERPL-SCNC: 23 MMOL/L (ref 21–32)
CREAT SERPL-MCNC: 0.5 MG/DL (ref 0.6–1.3)
EOSINOPHIL # BLD AUTO: 0.15 THOUSAND/ΜL (ref 0–0.61)
EOSINOPHIL NFR BLD AUTO: 1 % (ref 0–6)
ERYTHROCYTE [DISTWIDTH] IN BLOOD BY AUTOMATED COUNT: 15.8 % (ref 11.6–15.1)
GFR SERPL CREATININE-BSD FRML MDRD: 90 ML/MIN/1.73SQ M
GLUCOSE SERPL-MCNC: 162 MG/DL (ref 65–140)
HCT VFR BLD AUTO: 40 % (ref 34.8–46.1)
HGB BLD-MCNC: 12.4 G/DL (ref 11.5–15.4)
IMM GRANULOCYTES # BLD AUTO: 0.1 THOUSAND/UL (ref 0–0.2)
IMM GRANULOCYTES NFR BLD AUTO: 1 % (ref 0–2)
LYMPHOCYTES # BLD AUTO: 1.28 THOUSANDS/ΜL (ref 0.6–4.47)
LYMPHOCYTES NFR BLD AUTO: 11 % (ref 14–44)
MCH RBC QN AUTO: 26.2 PG (ref 26.8–34.3)
MCHC RBC AUTO-ENTMCNC: 31 G/DL (ref 31.4–37.4)
MCV RBC AUTO: 85 FL (ref 82–98)
MONOCYTES # BLD AUTO: 0.9 THOUSAND/ΜL (ref 0.17–1.22)
MONOCYTES NFR BLD AUTO: 8 % (ref 4–12)
NEUTROPHILS # BLD AUTO: 9.21 THOUSANDS/ΜL (ref 1.85–7.62)
NEUTS SEG NFR BLD AUTO: 79 % (ref 43–75)
NRBC BLD AUTO-RTO: 0 /100 WBCS
PLATELET # BLD AUTO: 306 THOUSANDS/UL (ref 149–390)
PMV BLD AUTO: 10.1 FL (ref 8.9–12.7)
POTASSIUM SERPL-SCNC: 3.9 MMOL/L (ref 3.5–5.3)
RBC # BLD AUTO: 4.73 MILLION/UL (ref 3.81–5.12)
SODIUM SERPL-SCNC: 137 MMOL/L (ref 136–145)
WBC # BLD AUTO: 11.68 THOUSAND/UL (ref 4.31–10.16)

## 2020-01-26 PROCEDURE — C9113 INJ PANTOPRAZOLE SODIUM, VIA: HCPCS | Performed by: PHYSICIAN ASSISTANT

## 2020-01-26 PROCEDURE — 94640 AIRWAY INHALATION TREATMENT: CPT

## 2020-01-26 PROCEDURE — 85025 COMPLETE CBC W/AUTO DIFF WBC: CPT | Performed by: STUDENT IN AN ORGANIZED HEALTH CARE EDUCATION/TRAINING PROGRAM

## 2020-01-26 PROCEDURE — 99232 SBSQ HOSP IP/OBS MODERATE 35: CPT | Performed by: SURGERY

## 2020-01-26 PROCEDURE — 80048 BASIC METABOLIC PNL TOTAL CA: CPT | Performed by: STUDENT IN AN ORGANIZED HEALTH CARE EDUCATION/TRAINING PROGRAM

## 2020-01-26 PROCEDURE — 94760 N-INVAS EAR/PLS OXIMETRY 1: CPT

## 2020-01-26 RX ADMIN — HYDROMORPHONE HYDROCHLORIDE 0.2 MG: 1 INJECTION, SOLUTION INTRAMUSCULAR; INTRAVENOUS; SUBCUTANEOUS at 06:08

## 2020-01-26 RX ADMIN — HYDROMORPHONE HYDROCHLORIDE 0.2 MG: 1 INJECTION, SOLUTION INTRAMUSCULAR; INTRAVENOUS; SUBCUTANEOUS at 16:20

## 2020-01-26 RX ADMIN — LACTULOSE 200 G: 10 SOLUTION ORAL; RECTAL at 16:20

## 2020-01-26 RX ADMIN — DEXTRAN 70 AND HYPROMELLOSE 2910 1 DROP: 1; 3 SOLUTION/ DROPS OPHTHALMIC at 21:00

## 2020-01-26 RX ADMIN — HYDROMORPHONE HYDROCHLORIDE 0.2 MG: 1 INJECTION, SOLUTION INTRAMUSCULAR; INTRAVENOUS; SUBCUTANEOUS at 00:04

## 2020-01-26 RX ADMIN — HYDROMORPHONE HYDROCHLORIDE 0.2 MG: 1 INJECTION, SOLUTION INTRAMUSCULAR; INTRAVENOUS; SUBCUTANEOUS at 13:30

## 2020-01-26 RX ADMIN — DEXTRAN 70 AND HYPROMELLOSE 2910 1 DROP: 1; 3 SOLUTION/ DROPS OPHTHALMIC at 09:21

## 2020-01-26 RX ADMIN — PANTOPRAZOLE SODIUM 40 MG: 40 INJECTION, POWDER, FOR SOLUTION INTRAVENOUS at 21:06

## 2020-01-26 RX ADMIN — METOPROLOL TARTRATE 2.5 MG: 5 INJECTION INTRAVENOUS at 16:20

## 2020-01-26 RX ADMIN — DEXTRAN 70 AND HYPROMELLOSE 2910 1 DROP: 1; 3 SOLUTION/ DROPS OPHTHALMIC at 16:22

## 2020-01-26 RX ADMIN — HEPARIN SODIUM 5000 UNITS: 5000 INJECTION INTRAVENOUS; SUBCUTANEOUS at 21:23

## 2020-01-26 RX ADMIN — METOPROLOL TARTRATE 2.5 MG: 5 INJECTION INTRAVENOUS at 21:20

## 2020-01-26 RX ADMIN — BISACODYL 10 MG: 10 SUPPOSITORY RECTAL at 09:21

## 2020-01-26 RX ADMIN — HYDROMORPHONE HYDROCHLORIDE 0.2 MG: 1 INJECTION, SOLUTION INTRAMUSCULAR; INTRAVENOUS; SUBCUTANEOUS at 20:58

## 2020-01-26 RX ADMIN — PANTOPRAZOLE SODIUM 40 MG: 40 INJECTION, POWDER, FOR SOLUTION INTRAVENOUS at 09:21

## 2020-01-26 RX ADMIN — LACTULOSE 200 G: 10 SOLUTION ORAL; RECTAL at 21:06

## 2020-01-26 RX ADMIN — LEVALBUTEROL HYDROCHLORIDE 1.25 MG: 1.25 SOLUTION, CONCENTRATE RESPIRATORY (INHALATION) at 13:17

## 2020-01-26 RX ADMIN — HYDROMORPHONE HYDROCHLORIDE 0.2 MG: 1 INJECTION, SOLUTION INTRAMUSCULAR; INTRAVENOUS; SUBCUTANEOUS at 09:33

## 2020-01-26 RX ADMIN — LEVALBUTEROL HYDROCHLORIDE 1.25 MG: 1.25 SOLUTION, CONCENTRATE RESPIRATORY (INHALATION) at 06:15

## 2020-01-26 RX ADMIN — LACTULOSE 200 G: 10 SOLUTION ORAL; RECTAL at 09:23

## 2020-01-26 RX ADMIN — DEXTROSE, SODIUM CHLORIDE, AND POTASSIUM CHLORIDE 100 ML/HR: 5; .45; .15 INJECTION INTRAVENOUS at 09:37

## 2020-01-26 RX ADMIN — IPRATROPIUM BROMIDE 0.5 MG: 0.5 SOLUTION RESPIRATORY (INHALATION) at 19:24

## 2020-01-26 RX ADMIN — DEXTROSE, SODIUM CHLORIDE, AND POTASSIUM CHLORIDE 100 ML/HR: 5; .45; .15 INJECTION INTRAVENOUS at 20:35

## 2020-01-26 RX ADMIN — LORAZEPAM 0.25 MG: 2 INJECTION INTRAMUSCULAR; INTRAVENOUS at 22:58

## 2020-01-26 RX ADMIN — BACITRACIN ZINC, NEOMYCIN SULFATE, AND POLYMYXIN B SULFATE 1 SMALL APPLICATION: 400; 3.5; 5 OINTMENT TOPICAL at 09:21

## 2020-01-26 RX ADMIN — IPRATROPIUM BROMIDE 0.5 MG: 0.5 SOLUTION RESPIRATORY (INHALATION) at 13:17

## 2020-01-26 RX ADMIN — HEPARIN SODIUM 5000 UNITS: 5000 INJECTION INTRAVENOUS; SUBCUTANEOUS at 13:31

## 2020-01-26 RX ADMIN — HEPARIN SODIUM 5000 UNITS: 5000 INJECTION INTRAVENOUS; SUBCUTANEOUS at 05:19

## 2020-01-26 RX ADMIN — IPRATROPIUM BROMIDE 0.5 MG: 0.5 SOLUTION RESPIRATORY (INHALATION) at 06:15

## 2020-01-26 RX ADMIN — METOPROLOL TARTRATE 2.5 MG: 5 INJECTION INTRAVENOUS at 04:28

## 2020-01-26 RX ADMIN — LEVALBUTEROL HYDROCHLORIDE 1.25 MG: 1.25 SOLUTION, CONCENTRATE RESPIRATORY (INHALATION) at 19:24

## 2020-01-26 RX ADMIN — METOPROLOL TARTRATE 2.5 MG: 5 INJECTION INTRAVENOUS at 09:20

## 2020-01-26 NOTE — PLAN OF CARE
Problem: Prexisting or High Potential for Compromised Skin Integrity  Goal: Skin integrity is maintained or improved  Description  INTERVENTIONS:  - Identify patients at risk for skin breakdown  - Assess and monitor skin integrity  - Assess and monitor nutrition and hydration status  - Monitor labs   - Assess for incontinence   - Turn and reposition patient  - Assist with mobility/ambulation  - Relieve pressure over bony prominences  - Avoid friction and shearing  - Provide appropriate hygiene as needed including keeping skin clean and dry  - Evaluate need for skin moisturizer/barrier cream  - Collaborate with interdisciplinary team   - Patient/family teaching  - Consider wound care consult   Outcome: Progressing     Problem: Potential for Falls  Goal: Patient will remain free of falls  Description  INTERVENTIONS:  - Assess patient frequently for physical needs  -  Identify cognitive and physical deficits and behaviors that affect risk of falls    -  [place] fall precautions as indicated by assessment   - Educate patient/family on patient safety including physical limitations  - Instruct patient to call for assistance with activity based on assessment  - Modify environment to reduce risk of injury  - Consider OT/PT consult to assist with strengthening/mobility  Outcome: Progressing     Problem: PAIN - ADULT  Goal: Verbalizes/displays adequate comfort level or baseline comfort level  Description  Interventions:  - Encourage patient to monitor pain and request assistance  - Assess pain using appropriate pain scale  - Administer analgesics based on type and severity of pain and evaluate response  - Implement non-pharmacological measures as appropriate and evaluate response  - Consider cultural and social influences on pain and pain management  - Notify physician/advanced practitioner if interventions unsuccessful or patient reports new pain  Outcome: Progressing     Problem: INFECTION - ADULT  Goal: Absence or prevention of progression during hospitalization  Description  INTERVENTIONS:  - Assess and monitor for signs and symptoms of infection  - Monitor lab/diagnostic results  - Monitor all insertion sites, i e  indwelling lines, tubes, and drains  - Monitor endotracheal if appropriate and nasal secretions for changes in amount and color  - [place] appropriate cooling/warming therapies per order  - Administer medications as ordered  - Instruct and encourage patient and family to use good hand hygiene technique  - Identify and instruct in appropriate isolation precautions for identified infection/condition  Outcome: Progressing  Goal: Absence of fever/infection during neutropenic period  Description  INTERVENTIONS:  - Monitor WBC    Outcome: Progressing     Problem: SAFETY ADULT  Goal: Patient will remain free of falls  Description  INTERVENTIONS:  - Assess patient frequently for physical needs  -  Identify cognitive and physical deficits and behaviors that affect risk of falls    -  [place] fall precautions as indicated by assessment   - Educate patient/family on patient safety including physical limitations  - Instruct patient to call for assistance with activity based on assessment  - Modify environment to reduce risk of injury  - Consider OT/PT consult to assist with strengthening/mobility  Outcome: Progressing  Goal: Maintain or return to baseline ADL function  Description  INTERVENTIONS:  -  Assess patient's ability to carry out ADLs; assess patient's baseline for ADL function and identify physical deficits which impact ability to perform ADLs (bathing, care of mouth/teeth, toileting, grooming, dressing, etc )  - Assess/evaluate cause of self-care deficits   - Assess range of motion  - Assess patient's mobility; develop plan if impaired  - Assess patient's need for assistive devices and provide as appropriate  - Encourage maximum independence but intervene and supervise when necessary  - Involve family in performance of ADLs  - Assess for home care needs following discharge   - Consider OT consult to assist with ADL evaluation and planning for discharge  - Provide patient education as appropriate  Outcome: Progressing  Goal: Maintain or return mobility status to optimal level  Description  INTERVENTIONS:  - Assess patient's baseline mobility status (ambulation, transfers, stairs, etc )    - Identify cognitive and physical deficits and behaviors that affect mobility  - Identify mobility aids required to assist with transfers and/or ambulation (gait belt, sit-to-stand, lift, walker, cane, etc )  - [place] fall precautions as indicated by assessment  - Record patient progress and toleration of activity level on Mobility SBAR; progress patient to next Phase/Stage  - Instruct patient to call for assistance with activity based on assessment  - Consider rehabilitation consult to assist with strengthening/weightbearing, etc   Outcome: Progressing     Problem: DISCHARGE PLANNING  Goal: Discharge to home or other facility with appropriate resources  Description  INTERVENTIONS:  - Identify barriers to discharge w/patient and caregiver  - Arrange for needed discharge resources and transportation as appropriate  - Identify discharge learning needs (meds, wound care, etc )  - Arrange for interpretive services to assist at discharge as needed  - Refer to Case Management Department for coordinating discharge planning if the patient needs post-hospital services based on physician/advanced practitioner order or complex needs related to functional status, cognitive ability, or social support system  Outcome: Progressing     Problem: Knowledge Deficit  Goal: Patient/family/caregiver demonstrates understanding of disease process, treatment plan, medications, and discharge instructions  Description  Complete learning assessment and assess knowledge base    Interventions:  - Provide teaching at level of understanding  - Provide teaching via preferred learning methods  Outcome: Progressing     Problem: RESPIRATORY - ADULT  Goal: Achieves optimal ventilation and oxygenation  Description  INTERVENTIONS:  - Assess for changes in respiratory status  - Assess for changes in mentation and behavior  - Position to facilitate oxygenation and minimize respiratory effort  - Oxygen administered by appropriate delivery if ordered  - Initiate smoking cessation education as indicated  - Encourage broncho-pulmonary hygiene including cough, deep breathe, Incentive Spirometry  - Assess the need for suctioning and aspirate as needed  - Assess and instruct to report SOB or any respiratory difficulty  - Respiratory Therapy support as indicated  Outcome: Progressing     Problem: GASTROINTESTINAL - ADULT  Goal: Minimal or absence of nausea and/or vomiting  Description  INTERVENTIONS:  - Administer IV fluids if ordered to ensure adequate hydration  - Maintain NPO status until nausea and vomiting are resolved  - Nasogastric tube if ordered  - Administer ordered antiemetic medications as needed  - Provide nonpharmacologic comfort measures as appropriate  - Advance diet as tolerated, if ordered  - Consider nutrition services referral to assist patient with adequate nutrition and appropriate food choices  Outcome: Progressing  Goal: Maintains or returns to baseline bowel function  Description  INTERVENTIONS:  - Assess bowel function  - Encourage oral fluids to ensure adequate hydration  - Administer IV fluids if ordered to ensure adequate hydration  - Administer ordered medications as needed  - Encourage mobilization and activity  - Consider nutritional services referral to assist patient with adequate nutrition and appropriate food choices  Outcome: Progressing  Goal: Maintains adequate nutritional intake  Description  INTERVENTIONS:  - Monitor percentage of each meal consumed  - Identify factors contributing to decreased intake, treat as appropriate  - Assist with meals as needed  - Monitor I&O, weight, and lab values if indicated  - Obtain nutrition services referral as needed  Outcome: Progressing     Problem: METABOLIC, FLUID AND ELECTROLYTES - ADULT  Goal: Electrolytes maintained within normal limits  Description  INTERVENTIONS:  - Monitor labs and assess patient for signs and symptoms of electrolyte imbalances  - Administer electrolyte replacement as ordered  - Monitor response to electrolyte replacements, including repeat lab results as appropriate  - Instruct patient on fluid and nutrition as appropriate  Outcome: Progressing  Goal: Fluid balance maintained  Description  INTERVENTIONS:  - Monitor labs   - Monitor I/O and WT  - Instruct patient on fluid and nutrition as appropriate  - Assess for signs & symptoms of volume excess or deficit  Outcome: Progressing     Problem: MUSCULOSKELETAL - ADULT  Goal: Maintain or return mobility to safest level of function  Description  INTERVENTIONS:  - Assess patient's ability to carry out ADLs; assess patient's baseline for ADL function and identify physical deficits which impact ability to perform ADLs (bathing, care of mouth/teeth, toileting, grooming, dressing, etc )  - Assess/evaluate cause of self-care deficits   - Assess range of motion  - Assess patient's mobility  - Assess patient's need for assistive devices and provide as appropriate  - Encourage maximum independence but intervene and supervise when necessary  - Involve family in performance of ADLs  - Assess for home care needs following discharge   - Consider OT consult to assist with ADL evaluation and planning for discharge  - Provide patient education as appropriate  Outcome: Progressing  Goal: Maintain proper alignment of affected body part  Description  INTERVENTIONS:  - Support, maintain and protect limb and body alignment  - Provide patient/ family with appropriate education  Outcome: Progressing     Problem: Nutrition/Hydration-ADULT  Goal: Nutrient/Hydration intake appropriate for improving, restoring or maintaining nutritional needs  Description  Monitor and assess patient's nutrition/hydration status for malnutrition  Collaborate with interdisciplinary team and initiate plan and interventions as ordered  Monitor patient's weight and dietary intake as ordered or per policy  Utilize nutrition screening tool and intervene as necessary  Determine patient's food preferences and provide high-protein, high-caloric foods as appropriate       INTERVENTIONS:  - Monitor oral intake, urinary output, labs, and treatment plans  - Assess nutrition and hydration status and recommend course of action  - Evaluate amount of meals eaten  - Assist patient with eating if necessary   - Allow adequate time for meals  - Recommend/ encourage appropriate diets, oral nutritional supplements, and vitamin/mineral supplements  - Order, calculate, and assess calorie counts as needed  - Recommend, monitor, and adjust tube feedings and TPN/PPN based on assessed needs  - Assess need for intravenous fluids  - Provide specific nutrition/hydration education as appropriate  - Include patient/family/caregiver in decisions related to nutrition  Outcome: Progressing     Problem: COPING  Goal: Pt/Family able to verbalize concerns and demonstrate effective coping strategies  Description  INTERVENTIONS:  - Assist patient/family to identify coping skills, available support systems and cultural and spiritual values  - Provide emotional support, including active listening and acknowledgement of concerns of patient and caregivers  - Reduce environmental stimuli, as able  - Provide patient education  - Assess for spiritual pain/suffering and initiate spiritual care, including notification of Pastoral Care or [place] based community as needed  - Assess effectiveness of coping strategies  Outcome: Progressing  Goal: Will report anxiety at manageable levels  Description  INTERVENTIONS:  - Administer medication as ordered  - Teach and encourage coping skills  - Provide emotional support  - Assess patient/family for anxiety and ability to cope  Outcome: Progressing

## 2020-01-26 NOTE — DISCHARGE INSTRUCTIONS
Neurosurgery discharge instructions following spine fracture:      TLSO brace to be worn when out of bed of head of bed greater than 45 degrees  May place brace on while sitting on edge of bed  May be removed for showering   No bending, twisting or heavy lifting  No strenuous activities  No Driving   Recommended to refrain from strenuous activity   Recommended to refrain from bending and twisting back   Recommended to limit lifting, pulling, pushing to no more then 10 lbs   No driving while being treated in back brace   Recommended that pt take fall precautions and refrain from activity that increases chance of fall or injury to back  **Please notify MD immediately if you have increased back or leg pain  New numbness, tingling and/or weakness in your legs  **    Wound Care Instructions:  Daily dressing change to midline incision  Cleans skin with soap and water  At proximal end of incision there is a small opening between staples  Take a 4X4 gauze and loosely pack into the incision  There cover with guaze and a abd pad  Needs to be done daily    Cleanse skin with soap and water    Follow up with surgery in 2 weeks

## 2020-01-26 NOTE — PROGRESS NOTES
Progress Note - General Surgery   Marni Hart 82 y o  female MRN: [id number]  Unit/Bed#: [facility] 802-01 Encounter: [id number]    Assessment:  82 y o  F who presented with a gastric outlet obstruction secondary to unknown etiology - possible Malignancy vs peptic stricture  S/p EG 1/21 showing food in stomach and were unable to complete procedure  Acute on chronic respiratory failure, resolving  NGT w 50 cc bilious output in 24 hrs    Plan:  NPO/NGT  [email or web address]  GI following- possible repeat EGD 1/27 -> aggressive bowel regimen  Continue Protonix   Titrate supplemental O2 to SPO2 >88%  OOB/Ambulate  DVT ppx      Subjective/Objective   Subjective:   Patient is still very upset this morning that she still has the NGT in  States she wants the NGT out and wants to leave  She does say her abdominal pain and nausea have improved  She continues to have BMs and pass flatus  Objective:     Blood pressure 124/81, pulse 91, temperature 97 8 °F (36 6 °C), resp  rate 18, height 5' (1 524 m), weight 62 4 kg (137 lb 9 1 oz), SpO2 96 %, not currently breastfeeding  ,Body mass index is 26 87 kg/m²  Intake/Output Summary (Last 24 hours) at 1/26/2020 0647  Last data filed at 1/26/2020 0407  Gross per 24 hour   Intake 2173 34 ml   Output 950 ml   Net 1223 34 ml       Invasive Devices     Peripheral Intravenous Line            Peripheral IV 01/25/20 Dorsal (posterior); Right Hand 1 day    Peripheral IV 01/25/20 Right Antecubital 1 day          Drain            NG/OG/Enteral Tube Nasogastric 16 Fr Right nares 4 days                Physical Exam:   NAD, alert and oriented x3  Normocephalic, atraumatic  MMM, EOMI, PERRLA, NGT in place  Norm resp effort on 4LNC  Rate controlled Afib   Abd soft, NT/ND  No calf tenderness or peripheral edema  Motor/sensation intact in distal extremities  CN grossly intact  -rash/lesions        Lab, Imaging and other studies:  I have personally reviewed pertinent lab results    , CBC: Lab Results   Component Value Date    WBC 11 68 (H) 01/26/2020    HGB 12 4 01/26/2020    HCT 40 0 01/26/2020    MCV 85 01/26/2020     01/26/2020    MCH 26 2 (L) 01/26/2020    MCHC 31 0 (L) 01/26/2020    RDW 15 8 (H) 01/26/2020    MPV 10 1 01/26/2020    NRBC 0 01/26/2020   , CMP:   Lab Results   Component Value Date    SODIUM 137 01/26/2020    K 3 9 01/26/2020     01/26/2020    CO2 23 01/26/2020    BUN 6 01/26/2020    CREATININE 0 50 (L) 01/26/2020    CALCIUM 9 0 01/26/2020    EGFR 90 01/26/2020   , Coagulation:   No results found for: PT, INR, APTT, Urinalysis: No results found for: COLORU, CLARITYU, SPECGRAV, PHUR, LEUKOCYTESUR, NITRITE, PROTEINUA, GLUCOSEU, KETONESU, BILIRUBINUR, BLOODU  VTE Pharmacologic Prophylaxis: Sequential compression device (Venodyne)   VTE Mechanical Prophylaxis: sequential compression device

## 2020-01-27 ENCOUNTER — TELEPHONE (OUTPATIENT)
Dept: NEUROSURGERY | Facility: CLINIC | Age: 85
End: 2020-01-27

## 2020-01-27 ENCOUNTER — APPOINTMENT (INPATIENT)
Dept: RADIOLOGY | Facility: HOSPITAL | Age: 85
DRG: 326 | End: 2020-01-27
Payer: MEDICARE

## 2020-01-27 LAB
ANION GAP SERPL CALCULATED.3IONS-SCNC: 5 MMOL/L (ref 4–13)
BASOPHILS # BLD AUTO: 0.03 THOUSANDS/ΜL (ref 0–0.1)
BASOPHILS NFR BLD AUTO: 0 % (ref 0–1)
BUN SERPL-MCNC: 4 MG/DL (ref 5–25)
CALCIUM SERPL-MCNC: 9 MG/DL (ref 8.3–10.1)
CHLORIDE SERPL-SCNC: 107 MMOL/L (ref 100–108)
CO2 SERPL-SCNC: 26 MMOL/L (ref 21–32)
CREAT SERPL-MCNC: 0.49 MG/DL (ref 0.6–1.3)
EOSINOPHIL # BLD AUTO: 0.15 THOUSAND/ΜL (ref 0–0.61)
EOSINOPHIL NFR BLD AUTO: 2 % (ref 0–6)
ERYTHROCYTE [DISTWIDTH] IN BLOOD BY AUTOMATED COUNT: 16.1 % (ref 11.6–15.1)
GFR SERPL CREATININE-BSD FRML MDRD: 91 ML/MIN/1.73SQ M
GLUCOSE SERPL-MCNC: 156 MG/DL (ref 65–140)
HCT VFR BLD AUTO: 39.4 % (ref 34.8–46.1)
HGB BLD-MCNC: 12 G/DL (ref 11.5–15.4)
IMM GRANULOCYTES # BLD AUTO: 0.08 THOUSAND/UL (ref 0–0.2)
IMM GRANULOCYTES NFR BLD AUTO: 1 % (ref 0–2)
LYMPHOCYTES # BLD AUTO: 1.22 THOUSANDS/ΜL (ref 0.6–4.47)
LYMPHOCYTES NFR BLD AUTO: 13 % (ref 14–44)
MCH RBC QN AUTO: 26.2 PG (ref 26.8–34.3)
MCHC RBC AUTO-ENTMCNC: 30.5 G/DL (ref 31.4–37.4)
MCV RBC AUTO: 86 FL (ref 82–98)
MONOCYTES # BLD AUTO: 0.86 THOUSAND/ΜL (ref 0.17–1.22)
MONOCYTES NFR BLD AUTO: 9 % (ref 4–12)
NEUTROPHILS # BLD AUTO: 7.22 THOUSANDS/ΜL (ref 1.85–7.62)
NEUTS SEG NFR BLD AUTO: 75 % (ref 43–75)
NRBC BLD AUTO-RTO: 0 /100 WBCS
PLATELET # BLD AUTO: 319 THOUSANDS/UL (ref 149–390)
PMV BLD AUTO: 10.1 FL (ref 8.9–12.7)
POTASSIUM SERPL-SCNC: 3.6 MMOL/L (ref 3.5–5.3)
RBC # BLD AUTO: 4.58 MILLION/UL (ref 3.81–5.12)
SODIUM SERPL-SCNC: 138 MMOL/L (ref 136–145)
WBC # BLD AUTO: 9.56 THOUSAND/UL (ref 4.31–10.16)

## 2020-01-27 PROCEDURE — 80048 BASIC METABOLIC PNL TOTAL CA: CPT | Performed by: STUDENT IN AN ORGANIZED HEALTH CARE EDUCATION/TRAINING PROGRAM

## 2020-01-27 PROCEDURE — C9113 INJ PANTOPRAZOLE SODIUM, VIA: HCPCS | Performed by: PHYSICIAN ASSISTANT

## 2020-01-27 PROCEDURE — 94760 N-INVAS EAR/PLS OXIMETRY 1: CPT

## 2020-01-27 PROCEDURE — 99232 SBSQ HOSP IP/OBS MODERATE 35: CPT | Performed by: INTERNAL MEDICINE

## 2020-01-27 PROCEDURE — 85025 COMPLETE CBC W/AUTO DIFF WBC: CPT | Performed by: STUDENT IN AN ORGANIZED HEALTH CARE EDUCATION/TRAINING PROGRAM

## 2020-01-27 PROCEDURE — 74018 RADEX ABDOMEN 1 VIEW: CPT

## 2020-01-27 PROCEDURE — 94640 AIRWAY INHALATION TREATMENT: CPT

## 2020-01-27 PROCEDURE — 99232 SBSQ HOSP IP/OBS MODERATE 35: CPT | Performed by: SURGERY

## 2020-01-27 RX ADMIN — IPRATROPIUM BROMIDE 0.5 MG: 0.5 SOLUTION RESPIRATORY (INHALATION) at 07:16

## 2020-01-27 RX ADMIN — DEXTROSE, SODIUM CHLORIDE, AND POTASSIUM CHLORIDE 100 ML/HR: 5; .45; .15 INJECTION INTRAVENOUS at 05:26

## 2020-01-27 RX ADMIN — METOPROLOL TARTRATE 2.5 MG: 5 INJECTION INTRAVENOUS at 04:35

## 2020-01-27 RX ADMIN — HEPARIN SODIUM 5000 UNITS: 5000 INJECTION INTRAVENOUS; SUBCUTANEOUS at 13:18

## 2020-01-27 RX ADMIN — HYDROMORPHONE HYDROCHLORIDE 0.2 MG: 1 INJECTION, SOLUTION INTRAMUSCULAR; INTRAVENOUS; SUBCUTANEOUS at 16:20

## 2020-01-27 RX ADMIN — LEVALBUTEROL HYDROCHLORIDE 1.25 MG: 1.25 SOLUTION, CONCENTRATE RESPIRATORY (INHALATION) at 13:29

## 2020-01-27 RX ADMIN — LORAZEPAM 0.25 MG: 2 INJECTION INTRAMUSCULAR; INTRAVENOUS at 22:32

## 2020-01-27 RX ADMIN — METOPROLOL TARTRATE 2.5 MG: 5 INJECTION INTRAVENOUS at 16:20

## 2020-01-27 RX ADMIN — PANTOPRAZOLE SODIUM 40 MG: 40 INJECTION, POWDER, FOR SOLUTION INTRAVENOUS at 20:01

## 2020-01-27 RX ADMIN — HYDROMORPHONE HYDROCHLORIDE 0.2 MG: 1 INJECTION, SOLUTION INTRAMUSCULAR; INTRAVENOUS; SUBCUTANEOUS at 05:20

## 2020-01-27 RX ADMIN — DEXTRAN 70 AND HYPROMELLOSE 2910 1 DROP: 1; 3 SOLUTION/ DROPS OPHTHALMIC at 16:23

## 2020-01-27 RX ADMIN — ALBUTEROL SULFATE 2.5 MG: 2.5 SOLUTION RESPIRATORY (INHALATION) at 04:26

## 2020-01-27 RX ADMIN — HYDROMORPHONE HYDROCHLORIDE 0.2 MG: 1 INJECTION, SOLUTION INTRAMUSCULAR; INTRAVENOUS; SUBCUTANEOUS at 13:18

## 2020-01-27 RX ADMIN — METOPROLOL TARTRATE 2.5 MG: 5 INJECTION INTRAVENOUS at 22:20

## 2020-01-27 RX ADMIN — DEXTRAN 70 AND HYPROMELLOSE 2910 1 DROP: 1; 3 SOLUTION/ DROPS OPHTHALMIC at 22:20

## 2020-01-27 RX ADMIN — HYDROMORPHONE HYDROCHLORIDE 0.2 MG: 1 INJECTION, SOLUTION INTRAMUSCULAR; INTRAVENOUS; SUBCUTANEOUS at 19:51

## 2020-01-27 RX ADMIN — LEVALBUTEROL HYDROCHLORIDE 1.25 MG: 1.25 SOLUTION, CONCENTRATE RESPIRATORY (INHALATION) at 19:30

## 2020-01-27 RX ADMIN — DEXTROSE, SODIUM CHLORIDE, AND POTASSIUM CHLORIDE 100 ML/HR: 5; .45; .15 INJECTION INTRAVENOUS at 14:41

## 2020-01-27 RX ADMIN — METOPROLOL TARTRATE 2.5 MG: 5 INJECTION INTRAVENOUS at 09:50

## 2020-01-27 RX ADMIN — BISACODYL 10 MG: 10 SUPPOSITORY RECTAL at 08:23

## 2020-01-27 RX ADMIN — LEVALBUTEROL HYDROCHLORIDE 1.25 MG: 1.25 SOLUTION, CONCENTRATE RESPIRATORY (INHALATION) at 07:15

## 2020-01-27 RX ADMIN — BACITRACIN ZINC, NEOMYCIN SULFATE, AND POLYMYXIN B SULFATE 1 SMALL APPLICATION: 400; 3.5; 5 OINTMENT TOPICAL at 08:24

## 2020-01-27 RX ADMIN — HEPARIN SODIUM 5000 UNITS: 5000 INJECTION INTRAVENOUS; SUBCUTANEOUS at 22:20

## 2020-01-27 RX ADMIN — IPRATROPIUM BROMIDE 0.5 MG: 0.5 SOLUTION RESPIRATORY (INHALATION) at 13:29

## 2020-01-27 RX ADMIN — IPRATROPIUM BROMIDE 0.5 MG: 0.5 SOLUTION RESPIRATORY (INHALATION) at 19:30

## 2020-01-27 RX ADMIN — DEXTRAN 70 AND HYPROMELLOSE 2910 1 DROP: 1; 3 SOLUTION/ DROPS OPHTHALMIC at 08:24

## 2020-01-27 RX ADMIN — HEPARIN SODIUM 5000 UNITS: 5000 INJECTION INTRAVENOUS; SUBCUTANEOUS at 05:21

## 2020-01-27 RX ADMIN — PANTOPRAZOLE SODIUM 40 MG: 40 INJECTION, POWDER, FOR SOLUTION INTRAVENOUS at 08:23

## 2020-01-27 RX ADMIN — HYDROMORPHONE HYDROCHLORIDE 0.2 MG: 1 INJECTION, SOLUTION INTRAMUSCULAR; INTRAVENOUS; SUBCUTANEOUS at 10:05

## 2020-01-27 NOTE — PROGRESS NOTES
Progress Note- Robert Mortensen 82 y o  female MRN: [id number]  Unit/Bed#: [facility] 802-01 Encounter: [id number]    Assessment and Plan:  Robert Mortensen is a 82 y o  old female with PMH: Atrial fibrillation on Eliquis, COPD, emphysema, diverticulosis, L4 vertebral compression fracture, previous colonic surgery who presents as a transfer for evaluation of gastric outlet obstruction       #Gastric Outlet Obstruction  Patient presented with evidence of gastric outlet obstruction with imaging/CT scan on 01/21/2020 showing  Distended stomach and esophagus with fluid and food debris  Patient was noted to have a large stool burden in the colon at that time and underwent multiple enemas with continued formed stool bowel movements  EGD was attempted at [facility] on 01/22 but was unable to reach the pylorus given the large amount of food debris  Patient has continued to have NG tube to intermittent suction with minimal output  She has been NPO since transfer to [place]       We will plan to do EGD on 01/28/2020  Will need to intubate patient given her large food content within her stomach       Plan:  -Plan for EGD tomorrow, NPO at [facility]   -plan for intubation  -NG tube to intermittent wall suction  -avoid promotility agents  ______________________________________________________________________    Subjective:     Patient states she is in significant discomfort with her NG tube  States that it is   Irritating her and she wants to remove it  Patient denies any abdominal pain at this time  No evidence of hematemesis or active emesis at this time       Medication Administration - last 24 hours from 01/26/2020 1059 to 01/27/2020 1059       Date/Time Order Dose Route Action Action by     01/27/2020 0824 dextran 70-hypromellose (GENTEAL TEARS) 0 1-0 3 % ophthalmic solution 1 drop 1 drop Both Eyes Given Edward Smith RN     01/26/2020 2100 dextran 70-hypromellose (GENTEAL TEARS) 0 1-0 3 % ophthalmic solution 1 drop 1 drop Both Eyes Given Sara Moon RN     01/26/2020 1622 dextran 70-hypromellose (GENTEAL TEARS) 0 1-0 3 % ophthalmic solution 1 drop 1 drop Both Eyes Given Tylor Avalos RN     01/27/2020 9167 neomycin-bacitracin-polymyxin b (NEOSPORIN) ointment 1 small application 1 small application Topical Given Elisha Robertson RN     01/27/2020 0909 pantoprazole (PROTONIX) injection 40 mg 40 mg Intravenous Given Elisha Robertson RN     01/26/2020 2106 pantoprazole (PROTONIX) injection 40 mg 40 mg Intravenous Given Sara Moon RN     01/26/2020 2258 LORazepam (ATIVAN) 2 mg/mL injection 0 25 mg 0 25 mg Intravenous Given Sara Moon RN     01/27/2020 0950 metoprolol (LOPRESSOR) injection 2 5 mg 2 5 mg Intravenous Given Elisha Robertson RN     01/27/2020 0435 metoprolol (LOPRESSOR) injection 2 5 mg 2 5 mg Intravenous Given Sara Moon RN     01/26/2020 2120 metoprolol (LOPRESSOR) injection 2 5 mg 2 5 mg Intravenous Given Sara Moon RN     01/26/2020 1620 metoprolol (LOPRESSOR) injection 2 5 mg 2 5 mg Intravenous Given Tylor Avalos RN     01/27/2020 0521 heparin (porcine) subcutaneous injection 5,000 Units 5,000 Units Subcutaneous Given Sara Moon RN     01/26/2020 2123 heparin (porcine) subcutaneous injection 5,000 Units 5,000 Units Subcutaneous Given Sara Moon RN     01/26/2020 1331 heparin (porcine) subcutaneous injection 5,000 Units 5,000 Units Subcutaneous Given Tylor Avalos RN     01/27/2020 5535 bisacodyl (DULCOLAX) rectal suppository 10 mg 10 mg Rectal Given Elisha Robertson RN     01/26/2020 2106 lactulose retention enema 200 g 200 g Rectal Given Sara Moon RN     01/26/2020 1620 lactulose retention enema 200 g 200 g Rectal Given Tylor Avalos RN     01/27/2020 1005 HYDROmorphone (DILAUDID) injection 0 2 mg 0 2 mg Intravenous Given Elisha Robertson RN     01/27/2020 9372 HYDROmorphone (DILAUDID) injection 0 2 mg 0 2 mg Intravenous Given Sara Moon, RN     01/26/2020 7381 HYDROmorphone (DILAUDID) injection 0 2 mg 0 2 mg Intravenous Given Wilhemina Mews, RN     01/26/2020 1620 HYDROmorphone (DILAUDID) injection 0 2 mg 0 2 mg Intravenous Given Lucas Pencil, RN     01/26/2020 1330 HYDROmorphone (DILAUDID) injection 0 2 mg 0 2 mg Intravenous Given Lucas Pencil, RN     01/27/2020 0526 dextrose 5 % and sodium chloride 0 45 % with KCl 20 mEq/L infusion 100 mL/hr Intravenous [address] Wilhemina Sreedhar, RN     01/27/2020 0525 dextrose 5 % and sodium chloride 0 45 % with KCl 20 mEq/L infusion 0 mL/hr Intravenous [facility]     01/26/2020 2035 dextrose 5 % and sodium chloride 0 45 % with KCl 20 mEq/L infusion 100 mL/hr Intravenous [place] Jillian Dawn Womack, RN     01/26/2020 2034 dextrose 5 % and sodium chloride 0 45 % with KCl 20 mEq/L infusion 0 mL/hr Intravenous [facility]pped [facility]     01/27/2020 0715 levalbuterol (XOPENEX) inhalation solution 1 25 mg 1 25 mg Nebulization Given Migdalia Speed, RT     01/26/2020 1924 levalbuterol (XOPENEX) inhalation solution 1 25 mg 1 25 mg Nebulization Given Valma Fabian, RT     01/26/2020 1317 levalbuterol (XOPENEX) inhalation solution 1 25 mg 1 25 mg Nebulization Given Semaj Revel Best, RT     01/27/2020 0716 ipratropium (ATROVENT) 0 02 % inhalation solution 0 5 mg 0 5 mg Nebulization Given Migdalia Speed, RT     01/26/2020 1924 ipratropium (ATROVENT) 0 02 % inhalation solution 0 5 mg 0 5 mg Nebulization Given Valma Fabian, RT     01/26/2020 1317 ipratropium (ATROVENT) 0 02 % inhalation solution 0 5 mg 0 5 mg Nebulization Given Cameron Revel Best, RT     01/27/2020 0426 albuterol inhalation solution 2 5 mg 2 5 mg Nebulization Given Valma Fabian, RT          Objective:     Vitals: Blood pressure 110/59, pulse 86, temperature (!) 96 6 °F (35 9 °C), temperature source Axillary, resp  rate 21, height 5' (1 524 m), weight 62 4 kg (137 lb 9 1 oz), SpO2 96 %, not currently breastfeeding  ,Body mass index is 26 87 kg/m²  Intake/Output Summary (Last 24 hours) at 1/27/2020 1059  Last data filed at 1/27/2020 0800  Gross per 24 hour   Intake 3090 ml   Output 1775 ml   Net 1315 ml       Physical Exam:   General Appearance:   Alert, cooperative, no distress   HEENT:    NG tube to suction with bilious return  2 L nasal cannula    Neck:  Supple, symmetrical, trachea midline   Lungs:   Clear to auscultation bilaterally; no rales, rhonchi or wheezing; respirations unlabored    Heart[de-identified]   Regular rate and rhythm; no murmur, rub, or gallop  Abdomen:    soft nontender   Genitalia:   Deferred    Rectal:   Deferred    Extremities:  No cyanosis, clubbing or edema    Pulses:  2+ and symmetric all extremities    Skin:  No jaundice, rashes, or lesions    Lymph nodes:  No palpable cervical lymphadenopathy        Invasive Devices     Peripheral Intravenous Line            Peripheral IV 01/25/20 Dorsal (posterior); Right Hand 2 days          Drain            NG/OG/Enteral Tube Nasogastric 16 Fr Right nares 5 days                Lab Results:  No results displayed because visit has over 200 results  Imaging Studies: I have personally reviewed pertinent imaging studies        ---------------------------------------------------  Note Electronically Signed By:    Curtis Boswell MD  [facility] Fellow PGY-4  PI #: [id number]

## 2020-01-27 NOTE — PLAN OF CARE
Problem: Prexisting or High Potential for Compromised Skin Integrity  Goal: Skin integrity is maintained or improved  Description  INTERVENTIONS:  - Identify patients at risk for skin breakdown  - Assess and monitor skin integrity  - Assess and monitor nutrition and hydration status  - Monitor labs   - Assess for incontinence   - Turn and reposition patient  - Assist with mobility/ambulation  - Relieve pressure over bony prominences  - Avoid friction and shearing  - Provide appropriate hygiene as needed including keeping skin clean and dry  - Evaluate need for skin moisturizer/barrier cream  - Collaborate with interdisciplinary team   - Patient/family teaching  - Consider wound care consult   Outcome: Progressing     Problem: Potential for Falls  Goal: Patient will remain free of falls  Description  INTERVENTIONS:  - Assess patient frequently for physical needs  -  Identify cognitive and physical deficits and behaviors that affect risk of falls    -  [place] fall precautions as indicated by assessment   - Educate patient/family on patient safety including physical limitations  - Instruct patient to call for assistance with activity based on assessment  - Modify environment to reduce risk of injury  - Consider OT/PT consult to assist with strengthening/mobility  Outcome: Progressing     Problem: PAIN - ADULT  Goal: Verbalizes/displays adequate comfort level or baseline comfort level  Description  Interventions:  - Encourage patient to monitor pain and request assistance  - Assess pain using appropriate pain scale  - Administer analgesics based on type and severity of pain and evaluate response  - Implement non-pharmacological measures as appropriate and evaluate response  - Consider cultural and social influences on pain and pain management  - Notify physician/advanced practitioner if interventions unsuccessful or patient reports new pain  Outcome: Progressing     Problem: INFECTION - ADULT  Goal: Absence or prevention of progression during hospitalization  Description  INTERVENTIONS:  - Assess and monitor for signs and symptoms of infection  - Monitor lab/diagnostic results  - Monitor all insertion sites, i e  indwelling lines, tubes, and drains  - Monitor endotracheal if appropriate and nasal secretions for changes in amount and color  - [place] appropriate cooling/warming therapies per order  - Administer medications as ordered  - Instruct and encourage patient and family to use good hand hygiene technique  - Identify and instruct in appropriate isolation precautions for identified infection/condition  Outcome: Progressing  Goal: Absence of fever/infection during neutropenic period  Description  INTERVENTIONS:  - Monitor WBC    Outcome: Progressing     Problem: SAFETY ADULT  Goal: Patient will remain free of falls  Description  INTERVENTIONS:  - Assess patient frequently for physical needs  -  Identify cognitive and physical deficits and behaviors that affect risk of falls    -  [place] fall precautions as indicated by assessment   - Educate patient/family on patient safety including physical limitations  - Instruct patient to call for assistance with activity based on assessment  - Modify environment to reduce risk of injury  - Consider OT/PT consult to assist with strengthening/mobility  Outcome: Progressing  Goal: Maintain or return to baseline ADL function  Description  INTERVENTIONS:  -  Assess patient's ability to carry out ADLs; assess patient's baseline for ADL function and identify physical deficits which impact ability to perform ADLs (bathing, care of mouth/teeth, toileting, grooming, dressing, etc )  - Assess/evaluate cause of self-care deficits   - Assess range of motion  - Assess patient's mobility; develop plan if impaired  - Assess patient's need for assistive devices and provide as appropriate  - Encourage maximum independence but intervene and supervise when necessary  - Involve family in performance of ADLs  - Assess for home care needs following discharge   - Consider OT consult to assist with ADL evaluation and planning for discharge  - Provide patient education as appropriate  Outcome: Progressing  Goal: Maintain or return mobility status to optimal level  Description  INTERVENTIONS:  - Assess patient's baseline mobility status (ambulation, transfers, stairs, etc )    - Identify cognitive and physical deficits and behaviors that affect mobility  - Identify mobility aids required to assist with transfers and/or ambulation (gait belt, sit-to-stand, lift, walker, cane, etc )  - [place] fall precautions as indicated by assessment  - Record patient progress and toleration of activity level on Mobility SBAR; progress patient to next Phase/Stage  - Instruct patient to call for assistance with activity based on assessment  - Consider rehabilitation consult to assist with strengthening/weightbearing, etc   Outcome: Progressing     Problem: DISCHARGE PLANNING  Goal: Discharge to home or other facility with appropriate resources  Description  INTERVENTIONS:  - Identify barriers to discharge w/patient and caregiver  - Arrange for needed discharge resources and transportation as appropriate  - Identify discharge learning needs (meds, wound care, etc )  - Arrange for interpretive services to assist at discharge as needed  - Refer to Case Management Department for coordinating discharge planning if the patient needs post-hospital services based on physician/advanced practitioner order or complex needs related to functional status, cognitive ability, or social support system  Outcome: Progressing     Problem: Knowledge Deficit  Goal: Patient/family/caregiver demonstrates understanding of disease process, treatment plan, medications, and discharge instructions  Description  Complete learning assessment and assess knowledge base    Interventions:  - Provide teaching at level of understanding  - Provide teaching via preferred learning methods  Outcome: Progressing     Problem: RESPIRATORY - ADULT  Goal: Achieves optimal ventilation and oxygenation  Description  INTERVENTIONS:  - Assess for changes in respiratory status  - Assess for changes in mentation and behavior  - Position to facilitate oxygenation and minimize respiratory effort  - Oxygen administered by appropriate delivery if ordered  - Initiate smoking cessation education as indicated  - Encourage broncho-pulmonary hygiene including cough, deep breathe, Incentive Spirometry  - Assess the need for suctioning and aspirate as needed  - Assess and instruct to report SOB or any respiratory difficulty  - Respiratory Therapy support as indicated  Outcome: Progressing     Problem: GASTROINTESTINAL - ADULT  Goal: Minimal or absence of nausea and/or vomiting  Description  INTERVENTIONS:  - Administer IV fluids if ordered to ensure adequate hydration  - Maintain NPO status until nausea and vomiting are resolved  - Nasogastric tube if ordered  - Administer ordered antiemetic medications as needed  - Provide nonpharmacologic comfort measures as appropriate  - Advance diet as tolerated, if ordered  - Consider nutrition services referral to assist patient with adequate nutrition and appropriate food choices  Outcome: Progressing  Goal: Maintains or returns to baseline bowel function  Description  INTERVENTIONS:  - Assess bowel function  - Encourage oral fluids to ensure adequate hydration  - Administer IV fluids if ordered to ensure adequate hydration  - Administer ordered medications as needed  - Encourage mobilization and activity  - Consider nutritional services referral to assist patient with adequate nutrition and appropriate food choices  Outcome: Progressing  Goal: Maintains adequate nutritional intake  Description  INTERVENTIONS:  - Monitor percentage of each meal consumed  - Identify factors contributing to decreased intake, treat as appropriate  - Assist with meals as needed  - Monitor I&O, weight, and lab values if indicated  - Obtain nutrition services referral as needed  Outcome: Progressing     Problem: METABOLIC, FLUID AND ELECTROLYTES - ADULT  Goal: Electrolytes maintained within normal limits  Description  INTERVENTIONS:  - Monitor labs and assess patient for signs and symptoms of electrolyte imbalances  - Administer electrolyte replacement as ordered  - Monitor response to electrolyte replacements, including repeat lab results as appropriate  - Instruct patient on fluid and nutrition as appropriate  Outcome: Progressing  Goal: Fluid balance maintained  Description  INTERVENTIONS:  - Monitor labs   - Monitor I/O and WT  - Instruct patient on fluid and nutrition as appropriate  - Assess for signs & symptoms of volume excess or deficit  Outcome: Progressing     Problem: MUSCULOSKELETAL - ADULT  Goal: Maintain or return mobility to safest level of function  Description  INTERVENTIONS:  - Assess patient's ability to carry out ADLs; assess patient's baseline for ADL function and identify physical deficits which impact ability to perform ADLs (bathing, care of mouth/teeth, toileting, grooming, dressing, etc )  - Assess/evaluate cause of self-care deficits   - Assess range of motion  - Assess patient's mobility  - Assess patient's need for assistive devices and provide as appropriate  - Encourage maximum independence but intervene and supervise when necessary  - Involve family in performance of ADLs  - Assess for home care needs following discharge   - Consider OT consult to assist with ADL evaluation and planning for discharge  - Provide patient education as appropriate  Outcome: Progressing  Goal: Maintain proper alignment of affected body part  Description  INTERVENTIONS:  - Support, maintain and protect limb and body alignment  - Provide patient/ family with appropriate education  Outcome: Progressing     Problem: Nutrition/Hydration-ADULT  Goal: Nutrient/Hydration intake appropriate for improving, restoring or maintaining nutritional needs  Description  Monitor and assess patient's nutrition/hydration status for malnutrition  Collaborate with interdisciplinary team and initiate plan and interventions as ordered  Monitor patient's weight and dietary intake as ordered or per policy  Utilize nutrition screening tool and intervene as necessary  Determine patient's food preferences and provide high-protein, high-caloric foods as appropriate       INTERVENTIONS:  - Monitor oral intake, urinary output, labs, and treatment plans  - Assess nutrition and hydration status and recommend course of action  - Evaluate amount of meals eaten  - Assist patient with eating if necessary   - Allow adequate time for meals  - Recommend/ encourage appropriate diets, oral nutritional supplements, and vitamin/mineral supplements  - Order, calculate, and assess calorie counts as needed  - Recommend, monitor, and adjust tube feedings and TPN/PPN based on assessed needs  - Assess need for intravenous fluids  - Provide specific nutrition/hydration education as appropriate  - Include patient/family/caregiver in decisions related to nutrition  Outcome: Progressing     Problem: COPING  Goal: Pt/Family able to verbalize concerns and demonstrate effective coping strategies  Description  INTERVENTIONS:  - Assist patient/family to identify coping skills, available support systems and cultural and spiritual values  - Provide emotional support, including active listening and acknowledgement of concerns of patient and caregivers  - Reduce environmental stimuli, as able  - Provide patient education  - Assess for spiritual pain/suffering and initiate spiritual care, including notification of Pastoral Care or [place] based community as needed  - Assess effectiveness of coping strategies  Outcome: Progressing  Goal: Will report anxiety at manageable levels  Description  INTERVENTIONS:  - Administer medication as ordered  - Teach and encourage coping skills  - Provide emotional support  - Assess patient/family for anxiety and ability to cope  Outcome: Progressing

## 2020-01-27 NOTE — PROGRESS NOTES
Pt refuses her venodynes; states "I don't want them on right now, I will wear them later"; instructed importance of wearing them; pt continues to decline at this time; pt also refusing to sign refusal paper at this time

## 2020-01-27 NOTE — ASSESSMENT & PLAN NOTE
- Status post EGD on 01/22/2020 and awaiting repeat endoscopy tentatively planned for 01/28/2020 to further evaluate the cause of her gastric outlet obstruction   - In the interim, patient to continue being NPO with NG tube in place for gastric decompression   - Continue to monitor abdominal exam and for continued bowel function   - Analgesia as needed  - Activity as tolerated and encouraged  - Encourage patient to use incentive spirometer   - Continue IV fluids until able to tolerate oral diet

## 2020-01-27 NOTE — PROGRESS NOTES
Pt requesting breathing treatment; states "I am having a hard time breathing  I need another treatment   I didn't finish the one I had this morning"; lung fields decreased/coarse; respirations 18; pulse ox on 2L 98%; notified respiratory, per respiratory, pt can have treatment at 1230; notified patient and she is agreeable

## 2020-01-27 NOTE — PROGRESS NOTES
Progress Note - Meet Douglas 1935, 82 y o  female MRN: [id number]    Unit/Bed#: [facility] 802-01 Encounter: [id number]    Primary Care Provider: No primary care provider on file  Date and time admitted to hospital: 1/22/2020  9:01 PM        * Gastric outlet obstruction  Assessment & Plan  - Status post EGD on 01/22/2020 and awaiting repeat endoscopy tentatively planned for 01/28/2020 to further evaluate the cause of her gastric outlet obstruction   - In the interim, patient to continue being NPO with NG tube in place for gastric decompression   - Continue to monitor abdominal exam and for continued bowel function   - Analgesia as needed  - Activity as tolerated and encouraged  - Encourage patient to use incentive spirometer   - Continue IV fluids until able to tolerate oral diet  Acute Care Surgery  Progress Note   Meet Douglas 82 y o  female MRN: [id number]  Unit/Bed#: [facility] 802-01 Encounter: [id number]    Nurse-patient-provider rounds completed today with the patient's nurse, Veronika Isaac  Subjective/Objective     Subjective:  Patient continues to feel okay  She is frustrated that she continues to have the NG tube and is unable to eat  She notes that she is not having any abdominal pain, only pain in her sinuses from the NG tube  She notes that she has been having both flatus and multiple bowel movements  She denies any fever, chills, nausea, vomiting, chest pain, shortness of breath or difficulty breathing      Meds/Allergies   Medications Prior to Admission   Medication Sig Dispense Refill Last Dose    albuterol (PROVENTIL HFA,VENTOLIN HFA) 90 mcg/act inhaler Inhale 2 puffs every 6 (six) hours as needed for wheezing or shortness of breath 1 Inhaler 1 1/20/2020 at Unknown time    alendronate (FOSAMAX) 70 mg tablet Take by mouth every 7 days   0 Past Week at Unknown time    ALPRAZolam (XANAX) 0 5 mg tablet Take 0 5 mg by mouth daily at bedtime as needed   1/20/2020 at Unknown time    apixaban (ELIQUIS) 5 mg Take 1 tablet (5 mg total) by mouth 2 (two) times a day 60 tablet 0 1/20/2020 at Unknown time    Calcium Carb-Cholecalciferol (CALCIUM 1000 + D PO) Take 1,000 mg by mouth daily   1/20/2020 at Unknown time    cholestyramine sugar free (QUESTRAN LIGHT) 4 g packet Take 1 packet (4 g total) by mouth 2 (two) times a day 60 tablet 1 1/20/2020 at Unknown time    diltiazem (CARDIZEM CD) 240 mg 24 hr capsule Take 1 capsule (240 mg total) by mouth daily 90 capsule 0 1/20/2020 at Unknown time    fluticasone-umeclidinium-vilanterol (TRELEGY ELLIPTA) 100-62 5-25 MCG/INH inhaler 1 puff daily   1/20/2020 at Unknown time    ipratropium-albuterol (DUO-NEB) 0 5-2 5 mg/3 mL nebulizer solution Take 1 vial (3 mL total) by nebulization every 6 (six) hours while awake 300 mL 0 Past Week at Unknown time    iron polysaccharides (FERREX) 150 mg capsule Take 1 capsule (150 mg total) by mouth 2 (two) times a day 60 capsule 1 1/20/2020 at Unknown time    loperamide (IMODIUM) 2 mg capsule Take 1 capsule (2 mg total) by mouth 3 (three) times a day as needed for diarrhea 30 capsule 1 Past Week at Unknown time    metoprolol tartrate (LOPRESSOR) 25 mg tablet Take 0 5 tablets (12 5 mg total) by mouth every 12 (twelve) hours 90 tablet 0 1/20/2020 at Unknown time    montelukast (SINGULAIR) 10 mg tablet Take 1 tablet (10 mg total) by mouth daily at bedtime 30 tablet 1 1/20/2020 at Unknown time    pantoprazole (PROTONIX) 40 mg tablet Take 1 tablet (40 mg total) by mouth daily in the early morning 30 tablet 1 1/20/2020 at Unknown time    potassium chloride (MICRO-K) 10 MEQ CR capsule Take 1 capsule (10 mEq total) by mouth daily 30 capsule 1 1/20/2020 at Unknown time    psyllium (METAMUCIL) packet Take 1 packet by mouth 3 (three) times a day 100 packet 1 1/20/2020 at Unknown time       Current Facility-Administered Medications:     acetaminophen (TYLENOL) rectal suppository 650 mg, 650 mg, Rectal, Q6H PRN, Mary Hazel Nisha Espinosa PA-C    albuterol inhalation solution 2 5 mg, 2 5 mg, Nebulization, Q4H PRN, Khai Stock, DO, 2 5 mg at 01/27/20 0426    bisacodyl (DULCOLAX) rectal suppository 10 mg, 10 mg, Rectal, Daily, Stephany Salinas PA-C, 10 mg at 01/27/20 0153    dextran 70-hypromellose (GENTEAL TEARS) 0 1-0 3 % ophthalmic solution 1 drop, 1 drop, Both Eyes, TID, Stephany Salinas PA-C, 1 drop at 01/27/20 1623    dextrose 5 % and sodium chloride 0 45 % with KCl 20 mEq/L infusion, 100 mL/hr, Intravenous, Continuous, Jasmine Talbot DO, Last Rate: 100 mL/hr at 01/27/20 1441, 100 mL/hr at 01/27/20 1441    heparin (porcine) subcutaneous injection 5,000 Units, 5,000 Units, Subcutaneous, Q8H [facility], Stephany Salinas PA-C, 5,000 Units at 01/27/20 1318    HYDROmorphone (DILAUDID) injection 0 2 mg, 0 2 mg, Intravenous, Q3H PRN, Ross Abraham, DO, 0 2 mg at 01/27/20 1620    HYDROmorphone (DILAUDID) injection 0 5 mg, 0 5 mg, Intravenous, Q3H PRN, Ross [place], DO, 0 5 mg at 01/24/20 0415    ipratropium (ATROVENT) 0 02 % inhalation solution 0 5 mg, 0 5 mg, Nebulization, TID, Khai Stock, DO, 0 5 mg at 01/27/20 1329    levalbuterol (XOPENEX) inhalation solution 1 25 mg, 1 25 mg, Nebulization, TID, Khai Stock, DO, 1 25 mg at 01/27/20 1329    LORazepam (ATIVAN) 2 mg/mL injection 0 25 mg, 0 25 mg, Intravenous, HS PRN, Stephany Salinas PA-C, 0 25 mg at 01/26/20 2258    metoprolol (LOPRESSOR) injection 2 5 mg, 2 5 mg, Intravenous, Q6H, Stephany Salinas PA-C, 2 5 mg at 01/27/20 1620    neomycin-bacitracin-polymyxin b (NEOSPORIN) ointment 1 small application, 1 small application, Topical, Daily, Stephany Salinas PA-C, 1 small application at 30/20/28 0824    ondansetron (ZOFRAN) injection 4 mg, 4 mg, Intravenous, Q6H PRN, Stephany Salinas PA-C    pantoprazole (PROTONIX) injection 40 mg, 40 mg, Intravenous, Q12H [facility], Stephany Salinas PA-C, 40 mg at 01/27/20 0823    phenol (CHLORASEPTIC) 1 4 % mucosal liquid 1 spray, 1 spray, Mouth/Throat, Q2H PRN, Faisal Arias DO, 1 spray at 20 1548    saliva substitute (MOUTH KOTE) mucosal solution 5 spray, 5 spray, Mouth/Throat, 4x Daily PRN, Joce Lee PA-C, 5 spray at 20 5190    Objective:     Vitals:Blood pressure 102/53, pulse 71, temperature 97 6 °F (36 4 °C), resp  rate 22, height 5' (1 524 m), weight 62 4 kg (137 lb 9 1 oz), SpO2 99 %, not currently breastfeeding  ,Body mass index is 26 87 kg/m²  SpO2: SpO2: 99 %    Temp (24hrs), Av 2 °F (36 2 °C), Min:96 4 °F (35 8 °C), Max:98 1 °F (36 7 °C)  Current: Temperature: 97 6 °F (36 4 °C)      Intake/Output Summary (Last 24 hours) at 2020 1736  Last data filed at 2020 1200  Gross per 24 hour   Intake 3090 ml   Output 1275 ml   Net 1815 ml       Invasive Devices     Peripheral Intravenous Line            Peripheral IV 20 Dorsal (posterior); Right Hand 2 days          Drain            NG/OG/Enteral Tube Nasogastric 16 Fr Right nares 6 days                Nutrition/GI Proph/Bowel Reg:  NPO with NG tube in place; Protonix  Physical Exam:     GENERAL APPEARANCE: Patient in no acute distress  HEENT: NCAT; PERRL, EOMs intact; Mucous membranes moist; NG tube in place  CV:  Irregularly irregular rhythm with normal rate; + S1, S2; no murmur/gallops/rubs appreciated  LUNGS: Clear to auscultation; no wheezes/rales/rhonci  ABD: NABS; soft; non-distended; non-tender  EXT: +2 pulses bilaterally upper & lower extremities; no clubbing/cyanosis/edema  NEURO: GCS 15; no focal neurologic deficits; neurovascularly intact  SKIN: Warm, dry and well perfused; no rash; no jaundice  Lab Results: Results: I have personally reviewed pertinent reports  Imaging/EKG Studies: Results: I have personally reviewed pertinent reports      Other Studies: N/A  VTE Prophylaxis: Sequential compression device (Venodyne)  and Heparin    Jenise Lindsay PA-C  2020 11:31 AM

## 2020-01-27 NOTE — TELEPHONE ENCOUNTER
02/17/2020-CALLED PT, LEFT MESSAGE ON MACHINE CONFIRMING 02/27/2020 APT AND TO HAVE XRAY COMPLETED PRIOR TO APT        02/14/2020-PT STILL IN HOSPITAL  02/27/2020 APT W/XRAY    02/13/2020-PT STILL IN HOSPITAL    02/12/2020-PT STILL IN HOSPITAL    02/11/2020-PT STILL IN HOSPITAL    02/10/2020-PT STILL IN HOSPITAL, TODAY'S APT CX-PA'S WILL SEE [address]    02/07/2020-PT STILL IN HOSPITAL    02/06/2020-PT STILL IN HOSPITAL    02/05/2020-PT STILL IN HOSPITAL    02/04/2020-PT STILL IN HOSPITAL    02/03/2020-PT STILL IN HOSPITAL    01/31/2020-PT STILL IN HOSPITAL    01/30/2020-PT STILL IN HOSPITAL    01/29/2020-PT STILL IN HOSPITAL    01/28/2020-PT [address]    01/27/2020-PT STILL IN HOSPITAL  02/10/2020 APT W/FEMI IN [facility] W/XRAY      ----- Message from Yandy Joaquin PA-C sent at 1/25/2020  7:39 PM EST -----  Regarding: f/u apt 2 wks with PA with xray lumbar  Hello,     Pt will likely still be in hospital when you get this message  Please help set up  f/u apt for pt in 2 wks with PA with xray lumbar spine that's ordered  Thanks much

## 2020-01-28 ENCOUNTER — ANESTHESIA (INPATIENT)
Dept: GASTROENTEROLOGY | Facility: HOSPITAL | Age: 85
DRG: 326 | End: 2020-01-28
Payer: MEDICARE

## 2020-01-28 ENCOUNTER — ANESTHESIA EVENT (INPATIENT)
Dept: GASTROENTEROLOGY | Facility: HOSPITAL | Age: 85
DRG: 326 | End: 2020-01-28
Payer: MEDICARE

## 2020-01-28 ENCOUNTER — APPOINTMENT (INPATIENT)
Dept: GASTROENTEROLOGY | Facility: HOSPITAL | Age: 85
DRG: 326 | End: 2020-01-28
Attending: INTERNAL MEDICINE
Payer: MEDICARE

## 2020-01-28 LAB
ANION GAP SERPL CALCULATED.3IONS-SCNC: 6 MMOL/L (ref 4–13)
BUN SERPL-MCNC: 2 MG/DL (ref 5–25)
CALCIUM SERPL-MCNC: 9.2 MG/DL (ref 8.3–10.1)
CHLORIDE SERPL-SCNC: 109 MMOL/L (ref 100–108)
CO2 SERPL-SCNC: 25 MMOL/L (ref 21–32)
CREAT SERPL-MCNC: 0.49 MG/DL (ref 0.6–1.3)
ERYTHROCYTE [DISTWIDTH] IN BLOOD BY AUTOMATED COUNT: 16.2 % (ref 11.6–15.1)
GFR SERPL CREATININE-BSD FRML MDRD: 91 ML/MIN/1.73SQ M
GLUCOSE SERPL-MCNC: 154 MG/DL (ref 65–140)
HCT VFR BLD AUTO: 38 % (ref 34.8–46.1)
HGB BLD-MCNC: 11.7 G/DL (ref 11.5–15.4)
MCH RBC QN AUTO: 26.5 PG (ref 26.8–34.3)
MCHC RBC AUTO-ENTMCNC: 30.8 G/DL (ref 31.4–37.4)
MCV RBC AUTO: 86 FL (ref 82–98)
PLATELET # BLD AUTO: 317 THOUSANDS/UL (ref 149–390)
PMV BLD AUTO: 10.4 FL (ref 8.9–12.7)
POTASSIUM SERPL-SCNC: 4 MMOL/L (ref 3.5–5.3)
RBC # BLD AUTO: 4.42 MILLION/UL (ref 3.81–5.12)
SODIUM SERPL-SCNC: 140 MMOL/L (ref 136–145)
WBC # BLD AUTO: 8.96 THOUSAND/UL (ref 4.31–10.16)

## 2020-01-28 PROCEDURE — 93005 ELECTROCARDIOGRAM TRACING: CPT

## 2020-01-28 PROCEDURE — 94760 N-INVAS EAR/PLS OXIMETRY 1: CPT

## 2020-01-28 PROCEDURE — 85027 COMPLETE CBC AUTOMATED: CPT | Performed by: SURGERY

## 2020-01-28 PROCEDURE — 94640 AIRWAY INHALATION TREATMENT: CPT

## 2020-01-28 PROCEDURE — C9113 INJ PANTOPRAZOLE SODIUM, VIA: HCPCS | Performed by: PHYSICIAN ASSISTANT

## 2020-01-28 PROCEDURE — 99232 SBSQ HOSP IP/OBS MODERATE 35: CPT | Performed by: SURGERY

## 2020-01-28 PROCEDURE — 80048 BASIC METABOLIC PNL TOTAL CA: CPT | Performed by: SURGERY

## 2020-01-28 PROCEDURE — 99232 SBSQ HOSP IP/OBS MODERATE 35: CPT | Performed by: INTERNAL MEDICINE

## 2020-01-28 RX ORDER — METOPROLOL TARTRATE 5 MG/5ML
2.5 INJECTION INTRAVENOUS EVERY 6 HOURS PRN
Status: DISCONTINUED | OUTPATIENT
Start: 2020-01-28 | End: 2020-01-29

## 2020-01-28 RX ORDER — METOCLOPRAMIDE HYDROCHLORIDE 5 MG/ML
10 INJECTION INTRAMUSCULAR; INTRAVENOUS ONCE AS NEEDED
Status: CANCELLED | OUTPATIENT
Start: 2020-01-28

## 2020-01-28 RX ORDER — ONDANSETRON 2 MG/ML
4 INJECTION INTRAMUSCULAR; INTRAVENOUS ONCE AS NEEDED
Status: CANCELLED | OUTPATIENT
Start: 2020-01-28

## 2020-01-28 RX ORDER — DIPHENHYDRAMINE HYDROCHLORIDE 50 MG/ML
12.5 INJECTION INTRAMUSCULAR; INTRAVENOUS ONCE AS NEEDED
Status: CANCELLED | OUTPATIENT
Start: 2020-01-28

## 2020-01-28 RX ORDER — SODIUM CHLORIDE, SODIUM LACTATE, POTASSIUM CHLORIDE, CALCIUM CHLORIDE 600; 310; 30; 20 MG/100ML; MG/100ML; MG/100ML; MG/100ML
125 INJECTION, SOLUTION INTRAVENOUS CONTINUOUS
Status: CANCELLED | OUTPATIENT
Start: 2020-01-28

## 2020-01-28 RX ORDER — LIDOCAINE HYDROCHLORIDE 10 MG/ML
0.5 INJECTION, SOLUTION EPIDURAL; INFILTRATION; INTRACAUDAL; PERINEURAL ONCE AS NEEDED
Status: CANCELLED | OUTPATIENT
Start: 2020-01-28

## 2020-01-28 RX ORDER — METOPROLOL TARTRATE 5 MG/5ML
INJECTION INTRAVENOUS AS NEEDED
Status: DISCONTINUED | OUTPATIENT
Start: 2020-01-28 | End: 2020-01-31 | Stop reason: HOSPADM

## 2020-01-28 RX ADMIN — ALBUTEROL SULFATE 2.5 MG: 2.5 SOLUTION RESPIRATORY (INHALATION) at 05:14

## 2020-01-28 RX ADMIN — HEPARIN SODIUM 5000 UNITS: 5000 INJECTION INTRAVENOUS; SUBCUTANEOUS at 05:04

## 2020-01-28 RX ADMIN — METOPROLOL TARTRATE 12.5 MG: 25 TABLET ORAL at 21:40

## 2020-01-28 RX ADMIN — HEPARIN SODIUM 5000 UNITS: 5000 INJECTION INTRAVENOUS; SUBCUTANEOUS at 15:06

## 2020-01-28 RX ADMIN — DEXTROSE, SODIUM CHLORIDE, AND POTASSIUM CHLORIDE 100 ML/HR: 5; .45; .15 INJECTION INTRAVENOUS at 01:00

## 2020-01-28 RX ADMIN — METOPROLOL TARTRATE 2.5 MG: 5 INJECTION INTRAVENOUS at 09:54

## 2020-01-28 RX ADMIN — DEXTROSE, SODIUM CHLORIDE, AND POTASSIUM CHLORIDE 100 ML/HR: 5; .45; .15 INJECTION INTRAVENOUS at 10:34

## 2020-01-28 RX ADMIN — IPRATROPIUM BROMIDE 0.5 MG: 0.5 SOLUTION RESPIRATORY (INHALATION) at 07:13

## 2020-01-28 RX ADMIN — PANTOPRAZOLE SODIUM 40 MG: 40 INJECTION, POWDER, FOR SOLUTION INTRAVENOUS at 21:38

## 2020-01-28 RX ADMIN — HYDROMORPHONE HYDROCHLORIDE 0.2 MG: 1 INJECTION, SOLUTION INTRAMUSCULAR; INTRAVENOUS; SUBCUTANEOUS at 02:23

## 2020-01-28 RX ADMIN — HYDROMORPHONE HYDROCHLORIDE 0.2 MG: 1 INJECTION, SOLUTION INTRAMUSCULAR; INTRAVENOUS; SUBCUTANEOUS at 06:36

## 2020-01-28 RX ADMIN — LEVALBUTEROL HYDROCHLORIDE 1.25 MG: 1.25 SOLUTION, CONCENTRATE RESPIRATORY (INHALATION) at 07:13

## 2020-01-28 RX ADMIN — DEXTROSE, SODIUM CHLORIDE, AND POTASSIUM CHLORIDE 100 ML/HR: 5; .45; .15 INJECTION INTRAVENOUS at 20:50

## 2020-01-28 RX ADMIN — DEXTRAN 70 AND HYPROMELLOSE 2910 1 DROP: 1; 3 SOLUTION/ DROPS OPHTHALMIC at 15:06

## 2020-01-28 RX ADMIN — IPRATROPIUM BROMIDE 0.5 MG: 0.5 SOLUTION RESPIRATORY (INHALATION) at 14:27

## 2020-01-28 RX ADMIN — METOPROLOL TARTRATE 2.5 MG: 5 INJECTION INTRAVENOUS at 09:50

## 2020-01-28 RX ADMIN — IPRATROPIUM BROMIDE 0.5 MG: 0.5 SOLUTION RESPIRATORY (INHALATION) at 19:49

## 2020-01-28 RX ADMIN — LEVALBUTEROL HYDROCHLORIDE 1.25 MG: 1.25 SOLUTION, CONCENTRATE RESPIRATORY (INHALATION) at 19:49

## 2020-01-28 RX ADMIN — DEXTRAN 70 AND HYPROMELLOSE 2910 1 DROP: 1; 3 SOLUTION/ DROPS OPHTHALMIC at 11:50

## 2020-01-28 RX ADMIN — HYDROMORPHONE HYDROCHLORIDE 0.2 MG: 1 INJECTION, SOLUTION INTRAMUSCULAR; INTRAVENOUS; SUBCUTANEOUS at 19:40

## 2020-01-28 RX ADMIN — HEPARIN SODIUM 5000 UNITS: 5000 INJECTION INTRAVENOUS; SUBCUTANEOUS at 21:39

## 2020-01-28 RX ADMIN — DEXTRAN 70 AND HYPROMELLOSE 2910 1 DROP: 1; 3 SOLUTION/ DROPS OPHTHALMIC at 21:39

## 2020-01-28 RX ADMIN — HYDROMORPHONE HYDROCHLORIDE 0.2 MG: 1 INJECTION, SOLUTION INTRAMUSCULAR; INTRAVENOUS; SUBCUTANEOUS at 15:06

## 2020-01-28 RX ADMIN — BISACODYL 10 MG: 10 SUPPOSITORY RECTAL at 11:49

## 2020-01-28 RX ADMIN — PANTOPRAZOLE SODIUM 40 MG: 40 INJECTION, POWDER, FOR SOLUTION INTRAVENOUS at 11:50

## 2020-01-28 RX ADMIN — LEVALBUTEROL HYDROCHLORIDE 1.25 MG: 1.25 SOLUTION, CONCENTRATE RESPIRATORY (INHALATION) at 14:27

## 2020-01-28 RX ADMIN — BACITRACIN ZINC, NEOMYCIN SULFATE, AND POLYMYXIN B SULFATE 1 SMALL APPLICATION: 400; 3.5; 5 OINTMENT TOPICAL at 11:50

## 2020-01-28 RX ADMIN — Medication 1 SPRAY: at 15:06

## 2020-01-28 NOTE — NUTRITION
01/28/20 1316   Recommendations/Interventions   Summary Patient remains NPO x6 days secondary to gastric outlet obstruction, NGT in place  EGD recscheduled to 1/29 per chart review  Right and left lower extremity trace edema  Nursing skin care plan reviewed  ? initiate alternate means of nutrition secondary to prolonged NPO status  Nutrition Recommendations Other (specify)  (If unable to safely advance diet to clear liquid with ensure clear TID within 24 hours suggest initiate standard TPN for day one and then increase to: 750 ml 10% aa, 600 ml 40% dex, 250 ml 20% lipids (1616 kcal, 75 grams protein, 1600 ml tv)   )

## 2020-01-28 NOTE — QUICK NOTE
Nurse-Patient-Provider rounds were completed with the patient's nurses today, Jasmin Carroll and Maxine Qiu  We discussed the plan is to keep her NPO and to maintain the NG tube  Continue IV fluids for hydration while NPO  Her planned repeat endoscopy was canceled this morning secondary to atrial fibrillation with rapid ventricular response while patient was in the GI lab just prior to her procedure  She was administered metoprolol and return to the floor  Currently, her heart rate control is improved and she is asymptomatic  Will attempt to resume her home oral metoprolol dose with clamping of the NG tube later today following medication administration to allow for absorption  After discussion with the GI service, we will attempt to repeat the EGD tomorrow if heart rate control improved  We reviewed all of the invasive devices/lines/telemetry orders   - None  DVT Prophylaxis:  - Subcutaneous heparin and SCDs  Pain Assessment / Plan:  - Continue current analgesic regimen  Mobility Assessment / Plan:  - Activity as tolerated  Goals / Barriers for discharge:  - Not yet appropriate for discharge secondary to persistent NG tube requirement while awaiting repeat EGD  Will discuss with GI regarding timing of attempted EGD, possibly tomorrow if heart rate control improves  - Case management following; case and discharge needs discussed  All questions and concerns were addressed  I spent greater than 21 minutes reviewing the plan with the patient and the nurse, and coordinating care for the day      Bipin Howell PA-C  1/28/2020 11:03 AM

## 2020-01-28 NOTE — PLAN OF CARE
Problem: Nutrition/Hydration-ADULT  Goal: Nutrient/Hydration intake appropriate for improving, restoring or maintaining nutritional needs  Description  Monitor and assess patient's nutrition/hydration status for malnutrition  Collaborate with interdisciplinary team and initiate plan and interventions as ordered  Monitor patient's weight and dietary intake as ordered or per policy  Utilize nutrition screening tool and intervene as necessary  Determine patient's food preferences and provide high-protein, high-caloric foods as appropriate  INTERVENTIONS:  - Monitor oral intake, urinary output, labs, and treatment plans  - Assess nutrition and hydration status and recommend course of action  - Evaluate amount of meals eaten  - Assist patient with eating if necessary   - Allow adequate time for meals  - Recommend/ encourage appropriate diets, oral nutritional supplements, and vitamin/mineral supplements  - Order, calculate, and assess calorie counts as needed  - Recommend, monitor, and adjust tube feedings and TPN/PPN based on assessed needs  - Assess need for intravenous fluids  - Provide specific nutrition/hydration education as appropriate  - Include patient/family/caregiver in decisions related to nutrition  Outcome: Not Progressing   NPO, TPN recommendations provided

## 2020-01-28 NOTE — NURSING NOTE
Called by GI Lab  Patient went into rapid afib before EGD  Procedure cancelled  IV Metoprolol given, see MAR  Patient back on floor, HR in 60s  BP 96/60  Patient sitting up in chair, sats 90s on 2L  Red Sx called and notified, will be up to bedside  Awaiting any new orders  Will continue to monitor

## 2020-01-28 NOTE — PROGRESS NOTES
Progress Note - General Surgery   Robert Mortensen 82 y o  female MRN: [id number]  Unit/Bed#: [facility] 802-01 Encounter: [id number]    Assessment:  82 y o  F who presented with a gastric outlet obstruction secondary to unknown etiology - possible Malignancy vs peptic stricture  S/p EG 1/21 showing food in stomach and were unable to complete procedure  Plan:  - NPO/NGT  - [email or web address]  - GI following- possible repeat today EGD 1/28  - Continue Protonix   - Titrate supplemental O2 to SPO2 >88%  - OOB/Ambulate  - DVT ppx      Subjective/Objective   Subjective:   No acute events overnight  Patient wants to get NG out  Says she cannot stand it anymore    Objective:     Blood pressure 98/72, pulse 66, temperature 97 5 °F (36 4 °C), resp  rate (!) 24, height 5' (1 524 m), weight 62 4 kg (137 lb 9 1 oz), SpO2 94 %, not currently breastfeeding  ,Body mass index is 26 87 kg/m²  Intake/Output Summary (Last 24 hours) at 1/28/2020 0602  Last data filed at 1/28/2020 0449  Gross per 24 hour   Intake 1000 ml   Output 950 ml   Net 50 ml       Invasive Devices     Peripheral Intravenous Line            Peripheral IV 01/25/20 Dorsal (posterior); Right Hand 3 days          Drain            NG/OG/Enteral Tube Nasogastric 16 Fr Right nares 6 days                Physical Exam:  GEN: NAD  HEENT: MMM  NGT in place, 200 out  CV: RRR  Lung: Normal effort  Extrem: No CCE  Neuro: A+Ox3          Lab, Imaging and other studies:  I have personally reviewed pertinent lab results    , CBC:   Lab Results   Component Value Date    WBC 9 56 01/27/2020    HGB 12 0 01/27/2020    HCT 39 4 01/27/2020    MCV 86 01/27/2020     01/27/2020    MCH 26 2 (L) 01/27/2020    MCHC 30 5 (L) 01/27/2020    RDW 16 1 (H) 01/27/2020    MPV 10 1 01/27/2020    NRBC 0 01/27/2020   , CMP:   Lab Results   Component Value Date    SODIUM 138 01/27/2020    K 3 6 01/27/2020     01/27/2020    CO2 26 01/27/2020    BUN 4 (L) 01/27/2020    CREATININE 0 49 (L) 01/27/2020    CALCIUM 9 0 01/27/2020    EGFR 91 01/27/2020   , Coagulation:   No results found for: PT, INR, APTT, Urinalysis: No results found for: COLORU, CLARITYU, SPECGRAV, PHUR, LEUKOCYTESUR, NITRITE, PROTEINUA, GLUCOSEU, KETONESU, BILIRUBINUR, BLOODU  VTE Pharmacologic Prophylaxis: Sequential compression device (Venodyne)   VTE Mechanical Prophylaxis: sequential compression device

## 2020-01-28 NOTE — ANESTHESIA PREPROCEDURE EVALUATION
Review of Systems/Medical History  Patient summary reviewed  Chart reviewed  No history of anesthetic complications     Cardiovascular  Dysrhythmias , atrial fibrillation, CHF ,    Pulmonary  COPD , Asthma ,        GI/Hepatic    GERD ,   Comment: Gastric outlet obstruction s/p NGT     Negative  ROS        Endo/Other  Negative endo/other ROS      GYN  Negative gynecology ROS          Hematology  Negative hematology ROS      Musculoskeletal  Back pain , thoracic pain and lumbar pain, Sciatica, Gout,   Arthritis     Neurology  Negative neurology ROS      Psychology   Anxiety,              Physical Exam    Airway    Mallampati score: II  TM Distance: >3 FB  Neck ROM: full     Dental   No notable dental hx     Cardiovascular  Rhythm: regular, Rate: normal, Cardiovascular exam normal    Pulmonary  Pulmonary exam normal Breath sounds clear to auscultation,     Other Findings        Anesthesia Plan  ASA Score- 3     Anesthesia Type- general with ASA Monitors  Additional Monitors:   Airway Plan: ETT  Plan Factors-    Induction- intravenous and rapid sequence induction  Postoperative Plan-     Informed Consent- Anesthetic plan and risks discussed with patient  I personally reviewed this patient with the CRNA  Discussed and agreed on the Anesthesia Plan with the CRNA  Sherlyn Yu Recent labs personally reviewed:  Lab Results   Component Value Date    WBC 8 96 01/28/2020    HGB 11 7 01/28/2020     01/28/2020     Lab Results   Component Value Date     01/08/2016    K 4 0 01/28/2020    BUN 2 (L) 01/28/2020    CREATININE 0 49 (L) 01/28/2020    GLUCOSE 77 01/08/2016     Lab Results   Component Value Date    PTT 28 01/22/2020      Lab Results   Component Value Date    INR 1 09 01/22/2020     I, Austyn Villanueva MD, have personally seen and evaluated the patient prior to anesthetic care  I have reviewed the pre-anesthetic record, and other medical records if appropriate to the anesthetic care    If a CRNA is involved in the case, I have reviewed the CRNA assessment, if present, and agree  Risks/benefits and alternatives discussed with patient including possible PONV, sore throat, and possibility of rare anesthetic and surgical emergencies

## 2020-01-28 NOTE — H&P
Progress Note- Robert Mortensen 82 y o  female MRN: [id number]    Unit/Bed#: [facility] 802-01 Encounter: [id number]      Assessment and Plan:    82year old female on home O2 presenting with gastric outlet obstruction who has been maintained on NG tube with minimal output  Will plan EGD with intubation today with reattempt at re assessing upper GI tract  CT scan reviewed from a few days ago revealing large GOO with a large amount of retained food material     ______________________________________________________________________    Subjective:     Pt for EGD today, plan for intubation      Medication Administration - last 24 hours from 01/27/2020 0941 to 01/28/2020 0941       Date/Time Order Dose Route Action Action by     01/27/2020 2220 dextran 70-hypromellose (GENTEAL TEARS) 0 1-0 3 % ophthalmic solution 1 drop 1 drop Both Eyes Given Cathie Fothergill, RN     01/27/2020 1623 dextran 70-hypromellose (GENTEAL TEARS) 0 1-0 3 % ophthalmic solution 1 drop 1 drop Both Eyes Given Ronald Rutherford RN     01/27/2020 2001 pantoprazole (PROTONIX) injection 40 mg 40 mg Intravenous Given Cathie Fothergill, RN     01/27/2020 2232 LORazepam (ATIVAN) 2 mg/mL injection 0 25 mg 0 25 mg Intravenous Given Cathie Fothergill, RN     01/28/2020 0459 metoprolol (LOPRESSOR) injection 2 5 mg 2 5 mg Intravenous Not Given Cathie Fothergill, RN     01/27/2020 2220 metoprolol (LOPRESSOR) injection 2 5 mg 2 5 mg Intravenous Given Cathie Fothergill, RN     01/27/2020 1620 metoprolol (LOPRESSOR) injection 2 5 mg 2 5 mg Intravenous Given Ronald Rutherford RN     01/27/2020 0950 metoprolol (LOPRESSOR) injection 2 5 mg 2 5 mg Intravenous Given Edward Smith RN     01/28/2020 8804 heparin (porcine) subcutaneous injection 5,000 Units 5,000 Units Subcutaneous Given Cathie Fothergill, RN     01/27/2020 2220 heparin (porcine) subcutaneous injection 5,000 Units 5,000 Units Subcutaneous Given Cathie Fothergill, RN     01/27/2020 1318 heparin (porcine) subcutaneous injection 5,000 Units 5,000 Units Subcutaneous Given [facility], RN     01/28/2020 0636 HYDROmorphone (DILAUDID) injection 0 2 mg 0 2 mg Intravenous Given [facility], RN     01/28/2020 9421 HYDROmorphone (DILAUDID) injection 0 2 mg 0 2 mg Intravenous Given [facility], RN     01/27/2020 1951 HYDROmorphone (DILAUDID) injection 0 2 mg 0 2 mg Intravenous Given [facility], RN     01/27/2020 1620 HYDROmorphone (DILAUDID) injection 0 2 mg 0 2 mg Intravenous Given Casandra Olivera, RN     01/27/2020 1318 HYDROmorphone (DILAUDID) injection 0 2 mg 0 2 mg Intravenous Given [facility], RN     01/27/2020 1005 HYDROmorphone (DILAUDID) injection 0 2 mg 0 2 mg Intravenous Given [facility], RN     01/28/2020 0100 dextrose 5 % and sodium chloride 0 45 % with KCl 20 mEq/L infusion 100 mL/hr Intravenous [address] [facility], RN     01/28/2020 0059 dextrose 5 % and sodium chloride 0 45 % with KCl 20 mEq/L infusion 0 mL/hr Intravenous Stopped [facility], RN     01/27/2020 1441 dextrose 5 % and sodium chloride 0 45 % with KCl 20 mEq/L infusion 100 mL/hr Intravenous [facility] Victor Manuel, RN     01/27/2020 1440 dextrose 5 % and sodium chloride 0 45 % with KCl 20 mEq/L infusion 0 mL/hr Intravenous Stopped [facility], RN     01/28/2020 4486 levalbuterol Classie Gouty) inhalation solution 1 25 mg 1 25 mg Nebulization Given [place] Revekaren Best, RT     01/27/2020 1930 levalbuterol (Christi Hippo) inhalation solution 1 25 mg 1 25 mg Nebulization Given Shelva Bump, RT     01/27/2020 1329 levalbuterol (XOPENEX) inhalation solution 1 25 mg 1 25 mg Nebulization Given Shelva Bump, RT     01/28/2020 0713 ipratropium (ATROVENT) 0 02 % inhalation solution 0 5 mg 0 5 mg Nebulization Given [place] Revel Best, RT     01/27/2020 1930 ipratropium (ATROVENT) 0 02 % inhalation solution 0 5 mg 0 5 mg Nebulization Given Betyva Lázaromp, RT     01/27/2020 1329 ipratropium (ATROVENT) 0 02 % inhalation solution 0 5 mg 0 5 mg Nebulization Given Mel Fernandez, RT     01/28/2020 0514 albuterol inhalation solution 2 5 mg 2 5 mg Nebulization Given Duong Dinora, RT          Objective:     Vitals: Blood pressure 115/71, pulse 79, temperature (!) 97 1 °F (36 2 °C), temperature source Tympanic, resp  rate 18, height 5' (1 524 m), weight 62 4 kg (137 lb 9 1 oz), SpO2 94 %, not currently breastfeeding  ,Body mass index is 26 87 kg/m²  Intake/Output Summary (Last 24 hours) at 1/28/2020 0941  Last data filed at 1/28/2020 0715  Gross per 24 hour   Intake 1000 ml   Output 950 ml   Net 50 ml       Physical Exam:   General Appearance: Awake and alert, in minimal acute distress  Abdomen: Soft, non-tender, non-distended; bowel sounds normal; no masses or no organomegaly    Invasive Devices     Peripheral Intravenous Line            Peripheral IV 01/25/20 Dorsal (posterior); Right Hand 3 days          Drain            NG/OG/Enteral Tube Nasogastric 16 Fr Right nares 6 days                Lab Results: HB stable, WBC 9      Imaging Studies: I have personally reviewed pertinent imaging studies    CT scan reviewed

## 2020-01-29 ENCOUNTER — ANESTHESIA (INPATIENT)
Dept: GASTROENTEROLOGY | Facility: HOSPITAL | Age: 85
DRG: 326 | End: 2020-01-29
Payer: MEDICARE

## 2020-01-29 ENCOUNTER — APPOINTMENT (INPATIENT)
Dept: GASTROENTEROLOGY | Facility: HOSPITAL | Age: 85
DRG: 326 | End: 2020-01-29
Payer: MEDICARE

## 2020-01-29 ENCOUNTER — ANESTHESIA EVENT (INPATIENT)
Dept: GASTROENTEROLOGY | Facility: HOSPITAL | Age: 85
DRG: 326 | End: 2020-01-29
Payer: MEDICARE

## 2020-01-29 LAB
ANION GAP SERPL CALCULATED.3IONS-SCNC: 6 MMOL/L (ref 4–13)
ATRIAL RATE: 166 BPM
BASOPHILS # BLD AUTO: 0.03 THOUSANDS/ΜL (ref 0–0.1)
BASOPHILS NFR BLD AUTO: 0 % (ref 0–1)
BUN SERPL-MCNC: 1 MG/DL (ref 5–25)
CALCIUM SERPL-MCNC: 9 MG/DL (ref 8.3–10.1)
CHLORIDE SERPL-SCNC: 109 MMOL/L (ref 100–108)
CO2 SERPL-SCNC: 26 MMOL/L (ref 21–32)
CREAT SERPL-MCNC: 0.52 MG/DL (ref 0.6–1.3)
EOSINOPHIL # BLD AUTO: 0.08 THOUSAND/ΜL (ref 0–0.61)
EOSINOPHIL NFR BLD AUTO: 1 % (ref 0–6)
ERYTHROCYTE [DISTWIDTH] IN BLOOD BY AUTOMATED COUNT: 15.9 % (ref 11.6–15.1)
GFR SERPL CREATININE-BSD FRML MDRD: 89 ML/MIN/1.73SQ M
GLUCOSE SERPL-MCNC: 144 MG/DL (ref 65–140)
HCT VFR BLD AUTO: 37.9 % (ref 34.8–46.1)
HGB BLD-MCNC: 11.3 G/DL (ref 11.5–15.4)
IMM GRANULOCYTES # BLD AUTO: 0.1 THOUSAND/UL (ref 0–0.2)
IMM GRANULOCYTES NFR BLD AUTO: 2 % (ref 0–2)
LYMPHOCYTES # BLD AUTO: 1.34 THOUSANDS/ΜL (ref 0.6–4.47)
LYMPHOCYTES NFR BLD AUTO: 20 % (ref 14–44)
MAGNESIUM SERPL-MCNC: 1.7 MG/DL (ref 1.6–2.6)
MCH RBC QN AUTO: 25.8 PG (ref 26.8–34.3)
MCHC RBC AUTO-ENTMCNC: 29.8 G/DL (ref 31.4–37.4)
MCV RBC AUTO: 87 FL (ref 82–98)
MONOCYTES # BLD AUTO: 0.64 THOUSAND/ΜL (ref 0.17–1.22)
MONOCYTES NFR BLD AUTO: 9 % (ref 4–12)
NEUTROPHILS # BLD AUTO: 4.65 THOUSANDS/ΜL (ref 1.85–7.62)
NEUTS SEG NFR BLD AUTO: 68 % (ref 43–75)
NRBC BLD AUTO-RTO: 0 /100 WBCS
PHOSPHATE SERPL-MCNC: 4.6 MG/DL (ref 2.3–4.1)
PLATELET # BLD AUTO: 309 THOUSANDS/UL (ref 149–390)
PMV BLD AUTO: 9.8 FL (ref 8.9–12.7)
POTASSIUM SERPL-SCNC: 3.7 MMOL/L (ref 3.5–5.3)
QRS AXIS: 1 DEGREES
QRSD INTERVAL: 78 MS
QT INTERVAL: 278 MS
QTC INTERVAL: 467 MS
RBC # BLD AUTO: 4.38 MILLION/UL (ref 3.81–5.12)
SODIUM SERPL-SCNC: 141 MMOL/L (ref 136–145)
T WAVE AXIS: 53 DEGREES
VENTRICULAR RATE: 170 BPM
WBC # BLD AUTO: 6.84 THOUSAND/UL (ref 4.31–10.16)

## 2020-01-29 PROCEDURE — 85025 COMPLETE CBC W/AUTO DIFF WBC: CPT | Performed by: SURGERY

## 2020-01-29 PROCEDURE — 80048 BASIC METABOLIC PNL TOTAL CA: CPT | Performed by: SURGERY

## 2020-01-29 PROCEDURE — 93010 ELECTROCARDIOGRAM REPORT: CPT | Performed by: INTERNAL MEDICINE

## 2020-01-29 PROCEDURE — 83735 ASSAY OF MAGNESIUM: CPT | Performed by: SURGERY

## 2020-01-29 PROCEDURE — C9113 INJ PANTOPRAZOLE SODIUM, VIA: HCPCS | Performed by: PHYSICIAN ASSISTANT

## 2020-01-29 PROCEDURE — 84100 ASSAY OF PHOSPHORUS: CPT | Performed by: SURGERY

## 2020-01-29 PROCEDURE — 94760 N-INVAS EAR/PLS OXIMETRY 1: CPT

## 2020-01-29 PROCEDURE — NC001 PR NO CHARGE: Performed by: SURGERY

## 2020-01-29 PROCEDURE — 94640 AIRWAY INHALATION TREATMENT: CPT

## 2020-01-29 PROCEDURE — 99223 1ST HOSP IP/OBS HIGH 75: CPT | Performed by: INTERNAL MEDICINE

## 2020-01-29 PROCEDURE — 99232 SBSQ HOSP IP/OBS MODERATE 35: CPT | Performed by: SURGERY

## 2020-01-29 RX ORDER — DIGOXIN 0.25 MG/ML
250 INJECTION INTRAMUSCULAR; INTRAVENOUS EVERY 6 HOURS
Status: COMPLETED | OUTPATIENT
Start: 2020-01-29 | End: 2020-01-29

## 2020-01-29 RX ORDER — METOPROLOL TARTRATE 5 MG/5ML
5 INJECTION INTRAVENOUS EVERY 6 HOURS PRN
Status: DISCONTINUED | OUTPATIENT
Start: 2020-01-29 | End: 2020-02-14 | Stop reason: HOSPADM

## 2020-01-29 RX ORDER — FUROSEMIDE 10 MG/ML
20 INJECTION INTRAMUSCULAR; INTRAVENOUS ONCE
Status: COMPLETED | OUTPATIENT
Start: 2020-01-29 | End: 2020-01-29

## 2020-01-29 RX ORDER — DIGOXIN 0.25 MG/ML
250 INJECTION INTRAMUSCULAR; INTRAVENOUS ONCE
Status: COMPLETED | OUTPATIENT
Start: 2020-01-29 | End: 2020-01-29

## 2020-01-29 RX ORDER — DILTIAZEM HYDROCHLORIDE 240 MG/1
240 CAPSULE, COATED, EXTENDED RELEASE ORAL DAILY
Status: DISCONTINUED | OUTPATIENT
Start: 2020-01-29 | End: 2020-01-29

## 2020-01-29 RX ORDER — MAGNESIUM SULFATE HEPTAHYDRATE 40 MG/ML
2 INJECTION, SOLUTION INTRAVENOUS ONCE
Status: COMPLETED | OUTPATIENT
Start: 2020-01-29 | End: 2020-01-30

## 2020-01-29 RX ORDER — METOPROLOL TARTRATE 5 MG/5ML
5 INJECTION INTRAVENOUS EVERY 6 HOURS
Status: DISCONTINUED | OUTPATIENT
Start: 2020-01-29 | End: 2020-02-14 | Stop reason: HOSPADM

## 2020-01-29 RX ORDER — POTASSIUM CHLORIDE 14.9 MG/ML
20 INJECTION INTRAVENOUS
Status: COMPLETED | OUTPATIENT
Start: 2020-01-29 | End: 2020-01-30

## 2020-01-29 RX ADMIN — ALBUTEROL SULFATE 2.5 MG: 2.5 SOLUTION RESPIRATORY (INHALATION) at 01:28

## 2020-01-29 RX ADMIN — IPRATROPIUM BROMIDE 0.5 MG: 0.5 SOLUTION RESPIRATORY (INHALATION) at 07:03

## 2020-01-29 RX ADMIN — DEXTROSE, SODIUM CHLORIDE, AND POTASSIUM CHLORIDE 100 ML/HR: 5; .45; .15 INJECTION INTRAVENOUS at 18:40

## 2020-01-29 RX ADMIN — DIGOXIN 250 MCG: 0.25 INJECTION INTRAMUSCULAR; INTRAVENOUS at 12:06

## 2020-01-29 RX ADMIN — FUROSEMIDE 20 MG: 10 INJECTION, SOLUTION INTRAMUSCULAR; INTRAVENOUS at 12:33

## 2020-01-29 RX ADMIN — METOPROLOL TARTRATE 5 MG: 5 INJECTION INTRAVENOUS at 10:48

## 2020-01-29 RX ADMIN — DIGOXIN 250 MCG: 0.25 INJECTION INTRAMUSCULAR; INTRAVENOUS at 22:21

## 2020-01-29 RX ADMIN — MAGNESIUM SULFATE HEPTAHYDRATE 2 G: 40 INJECTION, SOLUTION INTRAVENOUS at 12:22

## 2020-01-29 RX ADMIN — SODIUM CHLORIDE 500 ML: 0.9 INJECTION, SOLUTION INTRAVENOUS at 15:34

## 2020-01-29 RX ADMIN — HYDROMORPHONE HYDROCHLORIDE 0.2 MG: 1 INJECTION, SOLUTION INTRAMUSCULAR; INTRAVENOUS; SUBCUTANEOUS at 03:31

## 2020-01-29 RX ADMIN — IPRATROPIUM BROMIDE 0.5 MG: 0.5 SOLUTION RESPIRATORY (INHALATION) at 13:35

## 2020-01-29 RX ADMIN — LEVALBUTEROL HYDROCHLORIDE 1.25 MG: 1.25 SOLUTION, CONCENTRATE RESPIRATORY (INHALATION) at 07:03

## 2020-01-29 RX ADMIN — METOPROLOL TARTRATE 12.5 MG: 25 TABLET ORAL at 08:00

## 2020-01-29 RX ADMIN — DEXTRAN 70 AND HYPROMELLOSE 2910 1 DROP: 1; 3 SOLUTION/ DROPS OPHTHALMIC at 17:24

## 2020-01-29 RX ADMIN — POTASSIUM CHLORIDE 20 MEQ: 14.9 INJECTION, SOLUTION INTRAVENOUS at 14:26

## 2020-01-29 RX ADMIN — DEXTROSE, SODIUM CHLORIDE, AND POTASSIUM CHLORIDE 100 ML/HR: 5; .45; .15 INJECTION INTRAVENOUS at 07:58

## 2020-01-29 RX ADMIN — METOPROLOL TARTRATE 5 MG: 5 INJECTION INTRAVENOUS at 10:40

## 2020-01-29 RX ADMIN — METOPROLOL TARTRATE 5 MG: 5 INJECTION INTRAVENOUS at 16:07

## 2020-01-29 RX ADMIN — BACITRACIN ZINC, NEOMYCIN SULFATE, AND POLYMYXIN B SULFATE 1 SMALL APPLICATION: 400; 3.5; 5 OINTMENT TOPICAL at 08:00

## 2020-01-29 RX ADMIN — HEPARIN SODIUM 5000 UNITS: 5000 INJECTION INTRAVENOUS; SUBCUTANEOUS at 14:25

## 2020-01-29 RX ADMIN — HYDROMORPHONE HYDROCHLORIDE 0.2 MG: 1 INJECTION, SOLUTION INTRAMUSCULAR; INTRAVENOUS; SUBCUTANEOUS at 18:40

## 2020-01-29 RX ADMIN — POTASSIUM CHLORIDE 20 MEQ: 14.9 INJECTION, SOLUTION INTRAVENOUS at 12:33

## 2020-01-29 RX ADMIN — LEVALBUTEROL HYDROCHLORIDE 1.25 MG: 1.25 SOLUTION, CONCENTRATE RESPIRATORY (INHALATION) at 19:48

## 2020-01-29 RX ADMIN — HEPARIN SODIUM 5000 UNITS: 5000 INJECTION INTRAVENOUS; SUBCUTANEOUS at 05:15

## 2020-01-29 RX ADMIN — HEPARIN SODIUM 5000 UNITS: 5000 INJECTION INTRAVENOUS; SUBCUTANEOUS at 22:21

## 2020-01-29 RX ADMIN — DILTIAZEM HYDROCHLORIDE 2.5 MG/HR: 5 INJECTION INTRAVENOUS at 19:21

## 2020-01-29 RX ADMIN — PANTOPRAZOLE SODIUM 40 MG: 40 INJECTION, POWDER, FOR SOLUTION INTRAVENOUS at 22:21

## 2020-01-29 RX ADMIN — DIGOXIN 250 MCG: 0.25 INJECTION INTRAMUSCULAR; INTRAVENOUS at 16:10

## 2020-01-29 RX ADMIN — PANTOPRAZOLE SODIUM 40 MG: 40 INJECTION, POWDER, FOR SOLUTION INTRAVENOUS at 08:00

## 2020-01-29 RX ADMIN — METOPROLOL TARTRATE 5 MG: 5 INJECTION, SOLUTION INTRAVENOUS at 23:00

## 2020-01-29 RX ADMIN — LEVALBUTEROL HYDROCHLORIDE 1.25 MG: 1.25 SOLUTION, CONCENTRATE RESPIRATORY (INHALATION) at 13:35

## 2020-01-29 RX ADMIN — DEXTRAN 70 AND HYPROMELLOSE 2910 1 DROP: 1; 3 SOLUTION/ DROPS OPHTHALMIC at 22:27

## 2020-01-29 RX ADMIN — IPRATROPIUM BROMIDE 0.5 MG: 0.5 SOLUTION RESPIRATORY (INHALATION) at 19:48

## 2020-01-29 RX ADMIN — POTASSIUM CHLORIDE 20 MEQ: 14.9 INJECTION, SOLUTION INTRAVENOUS at 17:19

## 2020-01-29 RX ADMIN — DEXTRAN 70 AND HYPROMELLOSE 2910 1 DROP: 1; 3 SOLUTION/ DROPS OPHTHALMIC at 08:00

## 2020-01-29 RX ADMIN — METOPROLOL TARTRATE 5 MG: 5 INJECTION, SOLUTION INTRAVENOUS at 17:14

## 2020-01-29 RX ADMIN — BISACODYL 10 MG: 10 SUPPOSITORY RECTAL at 08:00

## 2020-01-29 RX ADMIN — DILTIAZEM HYDROCHLORIDE 240 MG: 240 CAPSULE, COATED, EXTENDED RELEASE ORAL at 08:00

## 2020-01-29 RX ADMIN — HYDROMORPHONE HYDROCHLORIDE 0.2 MG: 1 INJECTION, SOLUTION INTRAMUSCULAR; INTRAVENOUS; SUBCUTANEOUS at 22:35

## 2020-01-29 NOTE — SOCIAL WORK
EGD rescheduled for today 1/29  Pt reviewed in care rounds  Patient not clear for d/c  EGD not completed  Patient afib today, NG tube discontinued yesterday, and patient remains NPO

## 2020-01-29 NOTE — PROGRESS NOTES
Pt > 150 bpm on telemetry monitor  BP taken for 86/56 manually  Pt currently asymptomatic  Cardiology paged to notify  Per Dr Queen List, give 500 mL bolus of NSS over 30 minutes and they will be up to see pt  Will continue to monitor

## 2020-01-29 NOTE — CONSULTS
Team 2 - Cardiology - Consult  Tommie Carmona 82 y o  female MRN: [id number]  Unit/Bed#: [facility] 802-01 Encounter: [id number]      Reason for Consult / Principal Problem: AF RVR    Physician Requesting Consult: Carmela Urrutia DO    OP Cardiologist: ALISON Lopez    Assessment:  # AF with RVR  -likely precipitated by gastric outlet obstruction  -patient has been off Eliquis since 1/22 due to NPO status  # Mild periprocedural volume overload  # Preserved LVEF  # COPD, chronic hypoxic resp failure  # Hx of significant tobacco use  # Gastric outlet obstruction          Plan:  1  Will give digoxin 250mcg IV x1 now and then q6 x2  Hold Cardizem 240mg CD, metoprolol 12 5mg BID for now as she may not be absorbing it  Start scheduled IV metoprolol 5mg IV q6 for now  IV metoprolol can be given if HR is sustained >150  Avoid amiodarone/antiarrythmics as she has been off AC for a week  2  Give 500cc NS bolus and continue maintenance fluids  3  Resume anticoagulation when safe from a surgical standpoint  Heparin gtt can be initiated if unclear whether a procedure is to be done in the future  4  Monitor on telemetry  HPI: Tommie Carmona 82y o  year old female with a history of PAF on Eliquis/Cardizem 240mg/Metoprolol 12 5mg, COPD on home O2, tobacco use who originally presented 1/21 to [facility] with nausea, vomiting, chills/rigors  She had an CT that demonstrated gastric outlet obstruction  EGD was done but aborted due to large amount of solid food in stomach  Patient was transferred to [facility] 1/22 for further surgical care  An NGT was placed for decompression  Eliquis has been held for the last 5 days  Patient was taken for repeat EGD today but was found to be in AF RVR with rates reported as high as 190s  She was given metoprolol 5mg IV x2 per anesthesia with response but developed hypotension which required 200mcg of Fabian  Patient was brought back to floor in AF RVR 150s, SBP 90s   She did receive her AM Cardizem CD and metoprolol dose  Work up also demonstrated incidental T12 and L4 compression fractures  Neurosurgery evaluated and recommended brace, no surgical intervention  She states she does not feel the AF when she is in it  She believes her breathing is at baseline, denies dyspnea, orthopnea, PND  Denies palpitations, syncope, CP  Family History: non-contributory  Historical Information   Past Medical History:   Diagnosis Date    Asthma     Atrial fibrillation     Blurred vision     Cardiac disease     Colitis     COPD (chronic obstructive pulmonary disease)     Diverticulosis     DJD (degenerative joint disease)     Emphysema lung     Gait abnormality      Past Surgical History:   Procedure Laterality Date    BLADDER SURGERY      COLON SURGERY      COLONOSCOPY W/ POLYPECTOMY N/A 2019    Procedure: COLONOSCOPY W/ POLYPECTOMY;  Surgeon: Fabiola Castro MD;  Location: [facility] GI LAB; Service: General    SMALL INTESTINE SURGERY       Social History   Social History     Substance and Sexual Activity   Alcohol Use Not Currently    Frequency: Never    Binge frequency: Never     Social History     Substance and Sexual Activity   Drug Use Not Currently     Social History     Tobacco Use   Smoking Status Former Smoker    Last attempt to quit: 2013    Years since quittin 1   Smokeless Tobacco Never Used     Family History:   Family History   Problem Relation Age of Onset    Heart disease Mother        Review of Systems:  Review of Systems  Except as noted in the HPI and above, a comprehensive 14 point review of systems was negative      Scheduled Meds:  Current Facility-Administered Medications:  acetaminophen 650 mg Rectal Q6H PRN Jose J Paez, PA-C    albuterol 2 5 mg Nebulization Q4H PRN Kenard Chasten, DO    bisacodyl 10 mg Rectal Daily Jose J Paez, PA-C    dextran 70-hypromellose 1 drop Both Eyes TID Jose J Paez, PA-C    dextrose 5 % and sodium chloride 0 45 % with KCl 20 mEq/L 100 mL/hr Intravenous Continuous Jose Franco DO Last Rate: 100 mL/hr (01/29/20 0758)   diltiazem 240 mg Oral Daily Dat Bro MD    heparin (porcine) 5,000 Units Subcutaneous [facility] Christi Weber PA-C    HYDROmorphone 0 2 mg Intravenous Q3H PRN Jose Franco,     HYDROmorphone 0 5 mg Intravenous Q3H PRN [place] Monday Antione, DO    ipratropium 0 5 mg Nebulization TID Lilia Milana, DO    levalbuterol 1 25 mg Nebulization TID Lilia Milana, DO    LORazepam 0 25 mg Intravenous HS PRN Christi Weber PA-C    magnesium sulfate 2 g Intravenous Once Lele Bolton PA-C    metoprolol 2 5 mg Intravenous Q6H PRN Miri Ibarra PA-C    metoprolol tartrate 12 5 mg Oral Q12H [facility] Miri Ibarra PA-C    neomycin-bacitracin-polymyxin b 1 small application Topical Daily Christi Weber PA-C    ondansetron 4 mg Intravenous Q6H PRN Christi Weber PA-C    pantoprazole 40 mg Intravenous Q12H [facility] Christi Weber PA-C    phenol 1 spray Mouth/Throat Q2H PRN [place] Monday Antione, DO    potassium chloride 20 mEq Intravenous Q2H Lele Bolton PA-C    saliva substitute 5 spray Mouth/Throat 4x Daily PRN Christi Weber PA-C      Facility-Administered Medications Ordered in Other Encounters:  metoprolol   PRN Js Yu CRNA     Continuous Infusions:  dextrose 5 % and sodium chloride 0 45 % with KCl 20 mEq/L 100 mL/hr Last Rate: 100 mL/hr (01/29/20 0758)     PRN Meds:   acetaminophen    albuterol    HYDROmorphone    HYDROmorphone    LORazepam    metoprolol    ondansetron    phenol    saliva substitute  all current active meds have been reviewed    Allergies   Allergen Reactions    Fluticasone        Objective   Vitals: Blood pressure 99/63, pulse (!) 160, temperature (!) 97 3 °F (36 3 °C), resp   rate 18, height 5' (1 524 m), weight 62 4 kg (137 lb 9 1 oz), SpO2 98 %, not currently breastfeeding , Body mass index is 26 87 kg/m² , Orthostatic Blood Pressures      Most Recent Value   Blood Pressure  99/63 filed at 01/29/2020 1100   Patient Position - Orthostatic VS  Sitting filed at 01/28/2020 1029            Intake/Output Summary (Last 24 hours) at 1/29/2020 1153  Last data filed at 1/29/2020 0900  Gross per 24 hour   Intake 2898 33 ml   Output 1150 ml   Net 1748 33 ml       Invasive Devices     Peripheral Intravenous Line            Peripheral IV 01/28/20 Dorsal (posterior); Left Forearm less than 1 day                Physical Exam:  Physical Exam   Constitutional: She is oriented to person, place, and time  She appears well-developed and well-nourished  She appears distressed  HENT:   Head: Normocephalic and atraumatic  Eyes: Pupils are equal, round, and reactive to light  Conjunctivae and EOM are normal    Neck: Normal range of motion  Neck supple  No JVD present  Cardiovascular: Intact distal pulses  Exam reveals no gallop and no friction rub  No murmur heard  Irregularly irregular   Pulmonary/Chest: She is in respiratory distress  She has no wheezes  She has no rales  Globally diminished   Abdominal: Soft  She exhibits no distension and no mass  There is no rebound  Musculoskeletal: Normal range of motion  She exhibits edema (1+)  Neurological: She is alert and oriented to person, place, and time  Skin: Skin is warm and dry  Capillary refill takes less than 2 seconds  She is not diaphoretic  No pallor  Psychiatric: She has a normal mood and affect  Her behavior is normal        Lab Results: I have personally reviewed pertinent lab results  Imaging: I have personally reviewed pertinent reports  EKG: Personally reviewed    ECHO: reviewed  Previous Stress/Cath/PCI: n/a  Code Status: Level 1 - Full Code  Advance Directive and Living Will: Yes    Power of :    POLST:        Snehal Miller MD  Cardiology Fellow

## 2020-01-29 NOTE — PROGRESS NOTES
Progress Note - General Surgery   [place] 82 y o  female MRN: [id number]  Unit/Bed#: [facility] 802-01 Encounter: [id number]    Assessment:  82 y o  F who presented with a gastric outlet obstruction secondary to unknown etiology - possible Malignancy vs peptic stricture  S/p EG 1/21 showing food in stomach and were unable to complete procedure  Tachycardic to 150s overnight  BP 90s/60s    Plan:  NPO for EGD today  Maintenance IVF @ 100mL/hr  Begin home diltiazem with metoprolol for tachycardia  Ambulate      Subjective/Objective     Subjective: Patient was tachycardic to 150s overnight  She denies chest pain  She acknowledges shortness of breath  She is still having bowel function  NG tube was removed yesterday  Patient says she feels a lot better with the tube out  Objective:     Blood pressure 112/78, pulse 101, temperature 97 6 °F (36 4 °C), resp  rate 19, height 5' (1 524 m), weight 62 4 kg (137 lb 9 1 oz), SpO2 98 %, not currently breastfeeding  ,Body mass index is 26 87 kg/m²  Intake/Output Summary (Last 24 hours) at 1/29/2020 0617  Last data filed at 1/29/2020 0513  Gross per 24 hour   Intake 1980 ml   Output 1500 ml   Net 480 ml       Invasive Devices     Peripheral Intravenous Line            Peripheral IV 01/28/20 Dorsal (posterior); Left Forearm less than 1 day                Physical Exam:     Gen: NAD, AAOx3  CV: RRR with systolic murmur  Pulm: No increased work of breathing on 2L NC; Lungs clear to auscultation bilaterally  Abd: Soft, non tender, non distended    Lab, Imaging and other studies:   VTE Pharmacologic Prophylaxis: SQH  VTE Mechanical Prophylaxis: SCDs

## 2020-01-29 NOTE — PLAN OF CARE
Problem: Prexisting or High Potential for Compromised Skin Integrity  Goal: Skin integrity is maintained or improved  Description  INTERVENTIONS:  - Identify patients at risk for skin breakdown  - Assess and monitor skin integrity  - Assess and monitor nutrition and hydration status  - Monitor labs   - Assess for incontinence   - Turn and reposition patient  - Assist with mobility/ambulation  - Relieve pressure over bony prominences  - Avoid friction and shearing  - Provide appropriate hygiene as needed including keeping skin clean and dry  - Evaluate need for skin moisturizer/barrier cream  - Collaborate with interdisciplinary team   - Patient/family teaching  - Consider wound care consult   Outcome: Progressing     Problem: Potential for Falls  Goal: Patient will remain free of falls  Description  INTERVENTIONS:  - Assess patient frequently for physical needs  -  Identify cognitive and physical deficits and behaviors that affect risk of falls    -  [place] fall precautions as indicated by assessment   - Educate patient/family on patient safety including physical limitations  - Instruct patient to call for assistance with activity based on assessment  - Modify environment to reduce risk of injury  - Consider OT/PT consult to assist with strengthening/mobility  Outcome: Progressing     Problem: PAIN - ADULT  Goal: Verbalizes/displays adequate comfort level or baseline comfort level  Description  Interventions:  - Encourage patient to monitor pain and request assistance  - Assess pain using appropriate pain scale  - Administer analgesics based on type and severity of pain and evaluate response  - Implement non-pharmacological measures as appropriate and evaluate response  - Consider cultural and social influences on pain and pain management  - Notify physician/advanced practitioner if interventions unsuccessful or patient reports new pain  Outcome: Progressing     Problem: INFECTION - ADULT  Goal: Absence or prevention of progression during hospitalization  Description  INTERVENTIONS:  - Assess and monitor for signs and symptoms of infection  - Monitor lab/diagnostic results  - Monitor all insertion sites, i e  indwelling lines, tubes, and drains  - Monitor endotracheal if appropriate and nasal secretions for changes in amount and color  - [place] appropriate cooling/warming therapies per order  - Administer medications as ordered  - Instruct and encourage patient and family to use good hand hygiene technique  - Identify and instruct in appropriate isolation precautions for identified infection/condition  Outcome: Progressing  Goal: Absence of fever/infection during neutropenic period  Description  INTERVENTIONS:  - Monitor WBC    Outcome: Progressing     Problem: SAFETY ADULT  Goal: Patient will remain free of falls  Description  INTERVENTIONS:  - Assess patient frequently for physical needs  -  Identify cognitive and physical deficits and behaviors that affect risk of falls    -  [place] fall precautions as indicated by assessment   - Educate patient/family on patient safety including physical limitations  - Instruct patient to call for assistance with activity based on assessment  - Modify environment to reduce risk of injury  - Consider OT/PT consult to assist with strengthening/mobility  Outcome: Progressing  Goal: Maintain or return to baseline ADL function  Description  INTERVENTIONS:  -  Assess patient's ability to carry out ADLs; assess patient's baseline for ADL function and identify physical deficits which impact ability to perform ADLs (bathing, care of mouth/teeth, toileting, grooming, dressing, etc )  - Assess/evaluate cause of self-care deficits   - Assess range of motion  - Assess patient's mobility; develop plan if impaired  - Assess patient's need for assistive devices and provide as appropriate  - Encourage maximum independence but intervene and supervise when necessary  - Involve family in performance of ADLs  - Assess for home care needs following discharge   - Consider OT consult to assist with ADL evaluation and planning for discharge  - Provide patient education as appropriate  Outcome: Progressing  Goal: Maintain or return mobility status to optimal level  Description  INTERVENTIONS:  - Assess patient's baseline mobility status (ambulation, transfers, stairs, etc )    - Identify cognitive and physical deficits and behaviors that affect mobility  - Identify mobility aids required to assist with transfers and/or ambulation (gait belt, sit-to-stand, lift, walker, cane, etc )  - [place] fall precautions as indicated by assessment  - Record patient progress and toleration of activity level on Mobility SBAR; progress patient to next Phase/Stage  - Instruct patient to call for assistance with activity based on assessment  - Consider rehabilitation consult to assist with strengthening/weightbearing, etc   Outcome: Progressing     Problem: DISCHARGE PLANNING  Goal: Discharge to home or other facility with appropriate resources  Description  INTERVENTIONS:  - Identify barriers to discharge w/patient and caregiver  - Arrange for needed discharge resources and transportation as appropriate  - Identify discharge learning needs (meds, wound care, etc )  - Arrange for interpretive services to assist at discharge as needed  - Refer to Case Management Department for coordinating discharge planning if the patient needs post-hospital services based on physician/advanced practitioner order or complex needs related to functional status, cognitive ability, or social support system  Outcome: Progressing     Problem: Knowledge Deficit  Goal: Patient/family/caregiver demonstrates understanding of disease process, treatment plan, medications, and discharge instructions  Description  Complete learning assessment and assess knowledge base    Interventions:  - Provide teaching at level of understanding  - Provide teaching via preferred learning methods  Outcome: Progressing     Problem: RESPIRATORY - ADULT  Goal: Achieves optimal ventilation and oxygenation  Description  INTERVENTIONS:  - Assess for changes in respiratory status  - Assess for changes in mentation and behavior  - Position to facilitate oxygenation and minimize respiratory effort  - Oxygen administered by appropriate delivery if ordered  - Initiate smoking cessation education as indicated  - Encourage broncho-pulmonary hygiene including cough, deep breathe, Incentive Spirometry  - Assess the need for suctioning and aspirate as needed  - Assess and instruct to report SOB or any respiratory difficulty  - Respiratory Therapy support as indicated  Outcome: Progressing     Problem: GASTROINTESTINAL - ADULT  Goal: Minimal or absence of nausea and/or vomiting  Description  INTERVENTIONS:  - Administer IV fluids if ordered to ensure adequate hydration  - Maintain NPO status until nausea and vomiting are resolved  - Nasogastric tube if ordered  - Administer ordered antiemetic medications as needed  - Provide nonpharmacologic comfort measures as appropriate  - Advance diet as tolerated, if ordered  - Consider nutrition services referral to assist patient with adequate nutrition and appropriate food choices  Outcome: Progressing  Goal: Maintains or returns to baseline bowel function  Description  INTERVENTIONS:  - Assess bowel function  - Encourage oral fluids to ensure adequate hydration  - Administer IV fluids if ordered to ensure adequate hydration  - Administer ordered medications as needed  - Encourage mobilization and activity  - Consider nutritional services referral to assist patient with adequate nutrition and appropriate food choices  Outcome: Progressing  Goal: Maintains adequate nutritional intake  Description  INTERVENTIONS:  - Monitor percentage of each meal consumed  - Identify factors contributing to decreased intake, treat as appropriate  - Assist with meals as needed  - Monitor I&O, weight, and lab values if indicated  - Obtain nutrition services referral as needed  Outcome: Progressing     Problem: METABOLIC, FLUID AND ELECTROLYTES - ADULT  Goal: Electrolytes maintained within normal limits  Description  INTERVENTIONS:  - Monitor labs and assess patient for signs and symptoms of electrolyte imbalances  - Administer electrolyte replacement as ordered  - Monitor response to electrolyte replacements, including repeat lab results as appropriate  - Instruct patient on fluid and nutrition as appropriate  Outcome: Progressing  Goal: Fluid balance maintained  Description  INTERVENTIONS:  - Monitor labs   - Monitor I/O and WT  - Instruct patient on fluid and nutrition as appropriate  - Assess for signs & symptoms of volume excess or deficit  Outcome: Progressing     Problem: MUSCULOSKELETAL - ADULT  Goal: Maintain or return mobility to safest level of function  Description  INTERVENTIONS:  - Assess patient's ability to carry out ADLs; assess patient's baseline for ADL function and identify physical deficits which impact ability to perform ADLs (bathing, care of mouth/teeth, toileting, grooming, dressing, etc )  - Assess/evaluate cause of self-care deficits   - Assess range of motion  - Assess patient's mobility  - Assess patient's need for assistive devices and provide as appropriate  - Encourage maximum independence but intervene and supervise when necessary  - Involve family in performance of ADLs  - Assess for home care needs following discharge   - Consider OT consult to assist with ADL evaluation and planning for discharge  - Provide patient education as appropriate  Outcome: Progressing  Goal: Maintain proper alignment of affected body part  Description  INTERVENTIONS:  - Support, maintain and protect limb and body alignment  - Provide patient/ family with appropriate education  Outcome: Progressing     Problem: Nutrition/Hydration-ADULT  Goal: Nutrient/Hydration intake appropriate for improving, restoring or maintaining nutritional needs  Description  Monitor and assess patient's nutrition/hydration status for malnutrition  Collaborate with interdisciplinary team and initiate plan and interventions as ordered  Monitor patient's weight and dietary intake as ordered or per policy  Utilize nutrition screening tool and intervene as necessary  Determine patient's food preferences and provide high-protein, high-caloric foods as appropriate       INTERVENTIONS:  - Monitor oral intake, urinary output, labs, and treatment plans  - Assess nutrition and hydration status and recommend course of action  - Evaluate amount of meals eaten  - Assist patient with eating if necessary   - Allow adequate time for meals  - Recommend/ encourage appropriate diets, oral nutritional supplements, and vitamin/mineral supplements  - Order, calculate, and assess calorie counts as needed  - Recommend, monitor, and adjust tube feedings and TPN/PPN based on assessed needs  - Assess need for intravenous fluids  - Provide specific nutrition/hydration education as appropriate  - Include patient/family/caregiver in decisions related to nutrition  Outcome: Progressing     Problem: COPING  Goal: Pt/Family able to verbalize concerns and demonstrate effective coping strategies  Description  INTERVENTIONS:  - Assist patient/family to identify coping skills, available support systems and cultural and spiritual values  - Provide emotional support, including active listening and acknowledgement of concerns of patient and caregivers  - Reduce environmental stimuli, as able  - Provide patient education  - Assess for spiritual pain/suffering and initiate spiritual care, including notification of Pastoral Care or [place] based community as needed  - Assess effectiveness of coping strategies  Outcome: Progressing  Goal: Will report anxiety at manageable levels  Description  INTERVENTIONS:  - Administer medication as ordered  - Teach and encourage coping skills  - Provide emotional support  - Assess patient/family for anxiety and ability to cope  Outcome: Progressing

## 2020-01-29 NOTE — PROGRESS NOTES
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 - possible Malignancy vs peptic stricture  S/p EG 1/21 showing food in stomach and were unable to complete procedure    Unable to complete EGD yesterday d/t afib rvr  Afebrile  VSS  Few runs of tachycardia yesterday, 100-140s  abd soft/ nontender/ non distended  Had small bowel mvt this am      Plan:  NPO, likely clears after procedure  EGD today  dvt ppx  ambualte    Subjective:   No acute events  Denied fever, chills, chest pain, shortness of breath, nausea, vomiting, or abdominal pain this morning  Objective:     Vitals: Blood pressure 112/78, pulse 101, temperature 97 6 °F (36 4 °C), resp  rate 19, height 5' (1 524 m), weight 62 4 kg (137 lb 9 1 oz), SpO2 98 %, not currently breastfeeding  ,Body mass index is 26 87 kg/m²  Intake/Output Summary (Last 24 hours) at 1/29/2020 0622  Last data filed at 1/29/2020 0513  Gross per 24 hour   Intake 1980 ml   Output 1500 ml   Net 480 ml       Physical Exam  General: NAD  HEENT: NC/AT  MMM  Cv: RRR  Lungs: normal effort  Ab: Soft, NT/ND  Ex: no CCE  Neuro: AAOx3      Invasive Devices     Peripheral Intravenous Line            Peripheral IV 01/28/20 Dorsal (posterior); Left Forearm less than 1 day                Lab, Imaging and other studies: I have personally reviewed pertinent reports      VTE Pharmacologic Prophylaxis: Heparin  VTE Mechanical Prophylaxis: sequential compression device 5

## 2020-01-29 NOTE — PLAN OF CARE
Problem: Prexisting or High Potential for Compromised Skin Integrity  Goal: Skin integrity is maintained or improved  Description  INTERVENTIONS:  - Identify patients at risk for skin breakdown  - Assess and monitor skin integrity  - Assess and monitor nutrition and hydration status  - Monitor labs   - Assess for incontinence   - Turn and reposition patient  - Assist with mobility/ambulation  - Relieve pressure over bony prominences  - Avoid friction and shearing  - Provide appropriate hygiene as needed including keeping skin clean and dry  - Evaluate need for skin moisturizer/barrier cream  - Collaborate with interdisciplinary team   - Patient/family teaching  - Consider wound care consult   Outcome: Progressing     Problem: Potential for Falls  Goal: Patient will remain free of falls  Description  INTERVENTIONS:  - Assess patient frequently for physical needs  -  Identify cognitive and physical deficits and behaviors that affect risk of falls    -  [place] fall precautions as indicated by assessment   - Educate patient/family on patient safety including physical limitations  - Instruct patient to call for assistance with activity based on assessment  - Modify environment to reduce risk of injury  - Consider OT/PT consult to assist with strengthening/mobility  Outcome: Progressing     Problem: PAIN - ADULT  Goal: Verbalizes/displays adequate comfort level or baseline comfort level  Description  Interventions:  - Encourage patient to monitor pain and request assistance  - Assess pain using appropriate pain scale  - Administer analgesics based on type and severity of pain and evaluate response  - Implement non-pharmacological measures as appropriate and evaluate response  - Consider cultural and social influences on pain and pain management  - Notify physician/advanced practitioner if interventions unsuccessful or patient reports new pain  Outcome: Progressing     Problem: INFECTION - ADULT  Goal: Absence or prevention of progression during hospitalization  Description  INTERVENTIONS:  - Assess and monitor for signs and symptoms of infection  - Monitor lab/diagnostic results  - Monitor all insertion sites, i e  indwelling lines, tubes, and drains  - Monitor endotracheal if appropriate and nasal secretions for changes in amount and color  - [place] appropriate cooling/warming therapies per order  - Administer medications as ordered  - Instruct and encourage patient and family to use good hand hygiene technique  - Identify and instruct in appropriate isolation precautions for identified infection/condition  Outcome: Progressing  Goal: Absence of fever/infection during neutropenic period  Description  INTERVENTIONS:  - Monitor WBC    Outcome: Progressing     Problem: SAFETY ADULT  Goal: Patient will remain free of falls  Description  INTERVENTIONS:  - Assess patient frequently for physical needs  -  Identify cognitive and physical deficits and behaviors that affect risk of falls    -  [place] fall precautions as indicated by assessment   - Educate patient/family on patient safety including physical limitations  - Instruct patient to call for assistance with activity based on assessment  - Modify environment to reduce risk of injury  - Consider OT/PT consult to assist with strengthening/mobility  Outcome: Progressing  Goal: Maintain or return to baseline ADL function  Description  INTERVENTIONS:  -  Assess patient's ability to carry out ADLs; assess patient's baseline for ADL function and identify physical deficits which impact ability to perform ADLs (bathing, care of mouth/teeth, toileting, grooming, dressing, etc )  - Assess/evaluate cause of self-care deficits   - Assess range of motion  - Assess patient's mobility; develop plan if impaired  - Assess patient's need for assistive devices and provide as appropriate  - Encourage maximum independence but intervene and supervise when necessary  - Involve family in performance of ADLs  - Assess for home care needs following discharge   - Consider OT consult to assist with ADL evaluation and planning for discharge  - Provide patient education as appropriate  Outcome: Progressing  Goal: Maintain or return mobility status to optimal level  Description  INTERVENTIONS:  - Assess patient's baseline mobility status (ambulation, transfers, stairs, etc )    - Identify cognitive and physical deficits and behaviors that affect mobility  - Identify mobility aids required to assist with transfers and/or ambulation (gait belt, sit-to-stand, lift, walker, cane, etc )  - [place] fall precautions as indicated by assessment  - Record patient progress and toleration of activity level on Mobility SBAR; progress patient to next Phase/Stage  - Instruct patient to call for assistance with activity based on assessment  - Consider rehabilitation consult to assist with strengthening/weightbearing, etc   Outcome: Progressing     Problem: DISCHARGE PLANNING  Goal: Discharge to home or other facility with appropriate resources  Description  INTERVENTIONS:  - Identify barriers to discharge w/patient and caregiver  - Arrange for needed discharge resources and transportation as appropriate  - Identify discharge learning needs (meds, wound care, etc )  - Arrange for interpretive services to assist at discharge as needed  - Refer to Case Management Department for coordinating discharge planning if the patient needs post-hospital services based on physician/advanced practitioner order or complex needs related to functional status, cognitive ability, or social support system  Outcome: Progressing     Problem: Knowledge Deficit  Goal: Patient/family/caregiver demonstrates understanding of disease process, treatment plan, medications, and discharge instructions  Description  Complete learning assessment and assess knowledge base    Interventions:  - Provide teaching at level of understanding  - Provide teaching via preferred learning methods  Outcome: Progressing     Problem: RESPIRATORY - ADULT  Goal: Achieves optimal ventilation and oxygenation  Description  INTERVENTIONS:  - Assess for changes in respiratory status  - Assess for changes in mentation and behavior  - Position to facilitate oxygenation and minimize respiratory effort  - Oxygen administered by appropriate delivery if ordered  - Initiate smoking cessation education as indicated  - Encourage broncho-pulmonary hygiene including cough, deep breathe, Incentive Spirometry  - Assess the need for suctioning and aspirate as needed  - Assess and instruct to report SOB or any respiratory difficulty  - Respiratory Therapy support as indicated  Outcome: Progressing     Problem: GASTROINTESTINAL - ADULT  Goal: Minimal or absence of nausea and/or vomiting  Description  INTERVENTIONS:  - Administer IV fluids if ordered to ensure adequate hydration  - Maintain NPO status until nausea and vomiting are resolved  - Nasogastric tube if ordered  - Administer ordered antiemetic medications as needed  - Provide nonpharmacologic comfort measures as appropriate  - Advance diet as tolerated, if ordered  - Consider nutrition services referral to assist patient with adequate nutrition and appropriate food choices  Outcome: Progressing  Goal: Maintains or returns to baseline bowel function  Description  INTERVENTIONS:  - Assess bowel function  - Encourage oral fluids to ensure adequate hydration  - Administer IV fluids if ordered to ensure adequate hydration  - Administer ordered medications as needed  - Encourage mobilization and activity  - Consider nutritional services referral to assist patient with adequate nutrition and appropriate food choices  Outcome: Progressing  Goal: Maintains adequate nutritional intake  Description  INTERVENTIONS:  - Monitor percentage of each meal consumed  - Identify factors contributing to decreased intake, treat as appropriate  - Assist with meals as needed  - Monitor I&O, weight, and lab values if indicated  - Obtain nutrition services referral as needed  Outcome: Progressing     Problem: METABOLIC, FLUID AND ELECTROLYTES - ADULT  Goal: Electrolytes maintained within normal limits  Description  INTERVENTIONS:  - Monitor labs and assess patient for signs and symptoms of electrolyte imbalances  - Administer electrolyte replacement as ordered  - Monitor response to electrolyte replacements, including repeat lab results as appropriate  - Instruct patient on fluid and nutrition as appropriate  Outcome: Progressing  Goal: Fluid balance maintained  Description  INTERVENTIONS:  - Monitor labs   - Monitor I/O and WT  - Instruct patient on fluid and nutrition as appropriate  - Assess for signs & symptoms of volume excess or deficit  Outcome: Progressing     Problem: MUSCULOSKELETAL - ADULT  Goal: Maintain or return mobility to safest level of function  Description  INTERVENTIONS:  - Assess patient's ability to carry out ADLs; assess patient's baseline for ADL function and identify physical deficits which impact ability to perform ADLs (bathing, care of mouth/teeth, toileting, grooming, dressing, etc )  - Assess/evaluate cause of self-care deficits   - Assess range of motion  - Assess patient's mobility  - Assess patient's need for assistive devices and provide as appropriate  - Encourage maximum independence but intervene and supervise when necessary  - Involve family in performance of ADLs  - Assess for home care needs following discharge   - Consider OT consult to assist with ADL evaluation and planning for discharge  - Provide patient education as appropriate  Outcome: Progressing  Goal: Maintain proper alignment of affected body part  Description  INTERVENTIONS:  - Support, maintain and protect limb and body alignment  - Provide patient/ family with appropriate education  Outcome: Progressing     Problem: Nutrition/Hydration-ADULT  Goal: Nutrient/Hydration intake appropriate for improving, restoring or maintaining nutritional needs  Description  Monitor and assess patient's nutrition/hydration status for malnutrition  Collaborate with interdisciplinary team and initiate plan and interventions as ordered  Monitor patient's weight and dietary intake as ordered or per policy  Utilize nutrition screening tool and intervene as necessary  Determine patient's food preferences and provide high-protein, high-caloric foods as appropriate       INTERVENTIONS:  - Monitor oral intake, urinary output, labs, and treatment plans  - Assess nutrition and hydration status and recommend course of action  - Evaluate amount of meals eaten  - Assist patient with eating if necessary   - Allow adequate time for meals  - Recommend/ encourage appropriate diets, oral nutritional supplements, and vitamin/mineral supplements  - Order, calculate, and assess calorie counts as needed  - Recommend, monitor, and adjust tube feedings and TPN/PPN based on assessed needs  - Assess need for intravenous fluids  - Provide specific nutrition/hydration education as appropriate  - Include patient/family/caregiver in decisions related to nutrition  Outcome: Progressing     Problem: COPING  Goal: Pt/Family able to verbalize concerns and demonstrate effective coping strategies  Description  INTERVENTIONS:  - Assist patient/family to identify coping skills, available support systems and cultural and spiritual values  - Provide emotional support, including active listening and acknowledgement of concerns of patient and caregivers  - Reduce environmental stimuli, as able  - Provide patient education  - Assess for spiritual pain/suffering and initiate spiritual care, including notification of Pastoral Care or [place] based community as needed  - Assess effectiveness of coping strategies  Outcome: Progressing  Goal: Will report anxiety at manageable levels  Description  INTERVENTIONS:  - Administer medication as ordered  - Teach and encourage coping skills  - Provide emotional support  - Assess patient/family for anxiety and ability to cope  Outcome: Progressing

## 2020-01-29 NOTE — PROGRESS NOTES
Heart rate fluctuating between 70's-160's  BP 90/75  Parameters for PO Lopressor written as hold for SBP <110  Patient denies chest pain, SOB and dizziness  Notified Teresa Sea  Instructed to give PO lopressor despite low BP  Will continue to monitor

## 2020-01-29 NOTE — ANESTHESIA PREPROCEDURE EVALUATION
Review of Systems/Medical History  Patient summary reviewed  Chart reviewed  No history of anesthetic complications     Cardiovascular  Dysrhythmias , atrial fibrillation, CHF ,    Pulmonary  COPD , Asthma ,        GI/Hepatic    GERD ,   Comment: Gastric outlet obstruction     Negative  ROS        Endo/Other  Negative endo/other ROS      GYN  Negative gynecology ROS          Hematology  Negative hematology ROS      Musculoskeletal  Back pain , thoracic pain and lumbar pain, Sciatica, Gout,   Arthritis     Neurology  Negative neurology ROS      Psychology   Anxiety,              Physical Exam    Airway    Mallampati score: II  TM Distance: >3 FB  Neck ROM: full     Dental   No notable dental hx upper dentures and lower dentures,     Cardiovascular  Rhythm: regular, Rate: normal, Cardiovascular exam normal    Pulmonary  Pulmonary exam normal Breath sounds clear to auscultation,     Other Findings        Anesthesia Plan  ASA Score- 3     Anesthesia Type- general with ASA Monitors  Additional Monitors:   Airway Plan: ETT  Comment: Patient in A fib with RVR (HR 190s-200) on arrival to GI suite, asymptomatic and mentating well   5 mg IV metoprolol x2  and phenylephrine bolus administered with improvement in HR and BP  Decision made to cancel procedure pending cardiology evaluation        Plan Factors-    Induction- intravenous and rapid sequence induction  Postoperative Plan- Plan for postoperative opioid use  Planned trial extubation    Informed Consent- Anesthetic plan and risks discussed with patient  I personally reviewed this patient with the CRNA  Discussed and agreed on the Anesthesia Plan with the CRNA  Barry Boston         Results Reviewed     None

## 2020-01-29 NOTE — QUICK NOTE
I was notified by the gastroenterology service that the patient again experienced atrial fibrillation with RVR while in the GI lab just prior to attempting her a repeat EGD this morning  She did experience some hypotension requiring a bolus dose of phenylephrine, but no vasopressor infusion was required  The patient remained clinically asymptomatic per the GI service  She remains tachycardic with a heart rate in the 120s to 130s, but is no longer hypotensive and has returned to the floor  On my evaluation, the patient is visibly frustrated at her situation  She offers no complaints, denies any chest pain, shortness of breath or difficulty breathing, nausea, vomiting, abdominal pain, sweats, fever or chills  Her heart rate remains tachycardic and irregularly irregular; her abdomen is soft, nondistended and nontender; her skin is warm and dry without evidence of diaphoresis  Nurse-Patient-Provider rounds were completed with the patient's nurse today, Shimon Almonte  We discussed the plan is to keep her NPO except for sips of medications for now  Will upgraded to a level 2 step-down assignment and place her on telemetry  Cardiology has been consulted; awaiting their recommendations, but anticipate potentially initiating a Cardizem drip  Will need to closely follow her heart rate and blood pressure and monitor for any clinical signs or symptoms  I will further discuss the plan with GI and my attending, but may try clear liquid diet today in light of no evidence of recurrent/ongoing outlet obstruction clinically and continued bowel function  Continue b i d  PPI for now  Following cardiology evaluation recommendations, we will discuss plan for timing of repeat EGD  We reviewed all of the invasive devices/lines/telemetry orders   - None  DVT Prophylaxis:  - Subcutaneous heparin and SCDs  Pain Assessment / Plan:  - Continue current analgesic regimen      Mobility Assessment / Plan:  - Activity as tolerated  Goals / Barriers for discharge:  - Not yet appropriate for discharge secondary to atrial fibrillation with RVR and while awaiting repeat EGD   - Case management following; case and discharge needs discussed  All questions and concerns were addressed  I spent greater than 27 minutes reviewing the plan with the patient and the nurse, and coordinating care for the day      Hanane Becker PA-C  1/29/2020 11:18 AM

## 2020-01-29 NOTE — PLAN OF CARE
Problem: Prexisting or High Potential for Compromised Skin Integrity  Goal: Skin integrity is maintained or improved  Description  INTERVENTIONS:  - Identify patients at risk for skin breakdown  - Assess and monitor skin integrity  - Assess and monitor nutrition and hydration status  - Monitor labs   - Assess for incontinence   - Turn and reposition patient  - Assist with mobility/ambulation  - Relieve pressure over bony prominences  - Avoid friction and shearing  - Provide appropriate hygiene as needed including keeping skin clean and dry  - Evaluate need for skin moisturizer/barrier cream  - Collaborate with interdisciplinary team   - Patient/family teaching  - Consider wound care consult   Outcome: Progressing     Problem: Potential for Falls  Goal: Patient will remain free of falls  Description  INTERVENTIONS:  - Assess patient frequently for physical needs  -  Identify cognitive and physical deficits and behaviors that affect risk of falls    -  [place] fall precautions as indicated by assessment   - Educate patient/family on patient safety including physical limitations  - Instruct patient to call for assistance with activity based on assessment  - Modify environment to reduce risk of injury  - Consider OT/PT consult to assist with strengthening/mobility  Outcome: Progressing     Problem: PAIN - ADULT  Goal: Verbalizes/displays adequate comfort level or baseline comfort level  Description  Interventions:  - Encourage patient to monitor pain and request assistance  - Assess pain using appropriate pain scale  - Administer analgesics based on type and severity of pain and evaluate response  - Implement non-pharmacological measures as appropriate and evaluate response  - Consider cultural and social influences on pain and pain management  - Notify physician/advanced practitioner if interventions unsuccessful or patient reports new pain  Outcome: Progressing     Problem: INFECTION - ADULT  Goal: Absence or prevention of progression during hospitalization  Description  INTERVENTIONS:  - Assess and monitor for signs and symptoms of infection  - Monitor lab/diagnostic results  - Monitor all insertion sites, i e  indwelling lines, tubes, and drains  - Monitor endotracheal if appropriate and nasal secretions for changes in amount and color  - [place] appropriate cooling/warming therapies per order  - Administer medications as ordered  - Instruct and encourage patient and family to use good hand hygiene technique  - Identify and instruct in appropriate isolation precautions for identified infection/condition  Outcome: Progressing  Goal: Absence of fever/infection during neutropenic period  Description  INTERVENTIONS:  - Monitor WBC    Outcome: Progressing     Problem: SAFETY ADULT  Goal: Patient will remain free of falls  Description  INTERVENTIONS:  - Assess patient frequently for physical needs  -  Identify cognitive and physical deficits and behaviors that affect risk of falls    -  [place] fall precautions as indicated by assessment   - Educate patient/family on patient safety including physical limitations  - Instruct patient to call for assistance with activity based on assessment  - Modify environment to reduce risk of injury  - Consider OT/PT consult to assist with strengthening/mobility  Outcome: Progressing  Goal: Maintain or return to baseline ADL function  Description  INTERVENTIONS:  -  Assess patient's ability to carry out ADLs; assess patient's baseline for ADL function and identify physical deficits which impact ability to perform ADLs (bathing, care of mouth/teeth, toileting, grooming, dressing, etc )  - Assess/evaluate cause of self-care deficits   - Assess range of motion  - Assess patient's mobility; develop plan if impaired  - Assess patient's need for assistive devices and provide as appropriate  - Encourage maximum independence but intervene and supervise when necessary  - Involve family in performance of ADLs  - Assess for home care needs following discharge   - Consider OT consult to assist with ADL evaluation and planning for discharge  - Provide patient education as appropriate  Outcome: Progressing  Goal: Maintain or return mobility status to optimal level  Description  INTERVENTIONS:  - Assess patient's baseline mobility status (ambulation, transfers, stairs, etc )    - Identify cognitive and physical deficits and behaviors that affect mobility  - Identify mobility aids required to assist with transfers and/or ambulation (gait belt, sit-to-stand, lift, walker, cane, etc )  - [place] fall precautions as indicated by assessment  - Record patient progress and toleration of activity level on Mobility SBAR; progress patient to next Phase/Stage  - Instruct patient to call for assistance with activity based on assessment  - Consider rehabilitation consult to assist with strengthening/weightbearing, etc   Outcome: Progressing     Problem: DISCHARGE PLANNING  Goal: Discharge to home or other facility with appropriate resources  Description  INTERVENTIONS:  - Identify barriers to discharge w/patient and caregiver  - Arrange for needed discharge resources and transportation as appropriate  - Identify discharge learning needs (meds, wound care, etc )  - Arrange for interpretive services to assist at discharge as needed  - Refer to Case Management Department for coordinating discharge planning if the patient needs post-hospital services based on physician/advanced practitioner order or complex needs related to functional status, cognitive ability, or social support system  Outcome: Progressing     Problem: Knowledge Deficit  Goal: Patient/family/caregiver demonstrates understanding of disease process, treatment plan, medications, and discharge instructions  Description  Complete learning assessment and assess knowledge base    Interventions:  - Provide teaching at level of understanding  - Provide teaching via preferred learning methods  Outcome: Progressing     Problem: RESPIRATORY - ADULT  Goal: Achieves optimal ventilation and oxygenation  Description  INTERVENTIONS:  - Assess for changes in respiratory status  - Assess for changes in mentation and behavior  - Position to facilitate oxygenation and minimize respiratory effort  - Oxygen administered by appropriate delivery if ordered  - Initiate smoking cessation education as indicated  - Encourage broncho-pulmonary hygiene including cough, deep breathe, Incentive Spirometry  - Assess the need for suctioning and aspirate as needed  - Assess and instruct to report SOB or any respiratory difficulty  - Respiratory Therapy support as indicated  Outcome: Progressing     Problem: GASTROINTESTINAL - ADULT  Goal: Minimal or absence of nausea and/or vomiting  Description  INTERVENTIONS:  - Administer IV fluids if ordered to ensure adequate hydration  - Maintain NPO status until nausea and vomiting are resolved  - Nasogastric tube if ordered  - Administer ordered antiemetic medications as needed  - Provide nonpharmacologic comfort measures as appropriate  - Advance diet as tolerated, if ordered  - Consider nutrition services referral to assist patient with adequate nutrition and appropriate food choices  Outcome: Progressing  Goal: Maintains or returns to baseline bowel function  Description  INTERVENTIONS:  - Assess bowel function  - Encourage oral fluids to ensure adequate hydration  - Administer IV fluids if ordered to ensure adequate hydration  - Administer ordered medications as needed  - Encourage mobilization and activity  - Consider nutritional services referral to assist patient with adequate nutrition and appropriate food choices  Outcome: Progressing  Goal: Maintains adequate nutritional intake  Description  INTERVENTIONS:  - Monitor percentage of each meal consumed  - Identify factors contributing to decreased intake, treat as appropriate  - Assist with meals as needed  - Monitor I&O, weight, and lab values if indicated  - Obtain nutrition services referral as needed  Outcome: Progressing     Problem: METABOLIC, FLUID AND ELECTROLYTES - ADULT  Goal: Electrolytes maintained within normal limits  Description  INTERVENTIONS:  - Monitor labs and assess patient for signs and symptoms of electrolyte imbalances  - Administer electrolyte replacement as ordered  - Monitor response to electrolyte replacements, including repeat lab results as appropriate  - Instruct patient on fluid and nutrition as appropriate  Outcome: Progressing  Goal: Fluid balance maintained  Description  INTERVENTIONS:  - Monitor labs   - Monitor I/O and WT  - Instruct patient on fluid and nutrition as appropriate  - Assess for signs & symptoms of volume excess or deficit  Outcome: Progressing     Problem: MUSCULOSKELETAL - ADULT  Goal: Maintain or return mobility to safest level of function  Description  INTERVENTIONS:  - Assess patient's ability to carry out ADLs; assess patient's baseline for ADL function and identify physical deficits which impact ability to perform ADLs (bathing, care of mouth/teeth, toileting, grooming, dressing, etc )  - Assess/evaluate cause of self-care deficits   - Assess range of motion  - Assess patient's mobility  - Assess patient's need for assistive devices and provide as appropriate  - Encourage maximum independence but intervene and supervise when necessary  - Involve family in performance of ADLs  - Assess for home care needs following discharge   - Consider OT consult to assist with ADL evaluation and planning for discharge  - Provide patient education as appropriate  Outcome: Progressing  Goal: Maintain proper alignment of affected body part  Description  INTERVENTIONS:  - Support, maintain and protect limb and body alignment  - Provide patient/ family with appropriate education  Outcome: Progressing     Problem: Nutrition/Hydration-ADULT  Goal: Nutrient/Hydration intake appropriate for improving, restoring or maintaining nutritional needs  Description  Monitor and assess patient's nutrition/hydration status for malnutrition  Collaborate with interdisciplinary team and initiate plan and interventions as ordered  Monitor patient's weight and dietary intake as ordered or per policy  Utilize nutrition screening tool and intervene as necessary  Determine patient's food preferences and provide high-protein, high-caloric foods as appropriate       INTERVENTIONS:  - Monitor oral intake, urinary output, labs, and treatment plans  - Assess nutrition and hydration status and recommend course of action  - Evaluate amount of meals eaten  - Assist patient with eating if necessary   - Allow adequate time for meals  - Recommend/ encourage appropriate diets, oral nutritional supplements, and vitamin/mineral supplements  - Order, calculate, and assess calorie counts as needed  - Recommend, monitor, and adjust tube feedings and TPN/PPN based on assessed needs  - Assess need for intravenous fluids  - Provide specific nutrition/hydration education as appropriate  - Include patient/family/caregiver in decisions related to nutrition  Outcome: Progressing     Problem: COPING  Goal: Pt/Family able to verbalize concerns and demonstrate effective coping strategies  Description  INTERVENTIONS:  - Assist patient/family to identify coping skills, available support systems and cultural and spiritual values  - Provide emotional support, including active listening and acknowledgement of concerns of patient and caregivers  - Reduce environmental stimuli, as able  - Provide patient education  - Assess for spiritual pain/suffering and initiate spiritual care, including notification of Pastoral Care or [place] based community as needed  - Assess effectiveness of coping strategies  Outcome: Progressing  Goal: Will report anxiety at manageable levels  Description  INTERVENTIONS:  - Administer medication as ordered  - Teach and encourage coping skills  - Provide emotional support  - Assess patient/family for anxiety and ability to cope  Outcome: Progressing     Problem: CARDIOVASCULAR - ADULT  Goal: Maintains optimal cardiac output and hemodynamic stability  Description  INTERVENTIONS:  - Monitor I/O, vital signs and rhythm  - Monitor for S/S and trends of decreased cardiac output  - Administer and titrate ordered vasoactive medications to optimize hemodynamic stability  - Assess quality of pulses, skin color and temperature  - Assess for signs of decreased coronary artery perfusion  - Instruct patient to report change in severity of symptoms  Outcome: Progressing

## 2020-01-30 ENCOUNTER — APPOINTMENT (INPATIENT)
Dept: RADIOLOGY | Facility: HOSPITAL | Age: 85
DRG: 326 | End: 2020-01-30
Payer: MEDICARE

## 2020-01-30 ENCOUNTER — APPOINTMENT (INPATIENT)
Dept: GASTROENTEROLOGY | Facility: HOSPITAL | Age: 85
DRG: 326 | End: 2020-01-30
Attending: INTERNAL MEDICINE
Payer: MEDICARE

## 2020-01-30 ENCOUNTER — ANESTHESIA EVENT (INPATIENT)
Dept: GASTROENTEROLOGY | Facility: HOSPITAL | Age: 85
DRG: 326 | End: 2020-01-30
Payer: MEDICARE

## 2020-01-30 ENCOUNTER — ANESTHESIA (INPATIENT)
Dept: GASTROENTEROLOGY | Facility: HOSPITAL | Age: 85
DRG: 326 | End: 2020-01-30
Payer: MEDICARE

## 2020-01-30 LAB
ANION GAP SERPL CALCULATED.3IONS-SCNC: 5 MMOL/L (ref 4–13)
BASOPHILS # BLD AUTO: 0.04 THOUSANDS/ΜL (ref 0–0.1)
BASOPHILS NFR BLD AUTO: 0 % (ref 0–1)
BUN SERPL-MCNC: 1 MG/DL (ref 5–25)
CALCIUM SERPL-MCNC: 9 MG/DL (ref 8.3–10.1)
CEA SERPL-MCNC: 2.1 NG/ML (ref 0–3)
CHLORIDE SERPL-SCNC: 109 MMOL/L (ref 100–108)
CO2 SERPL-SCNC: 27 MMOL/L (ref 21–32)
CREAT SERPL-MCNC: 0.53 MG/DL (ref 0.6–1.3)
EOSINOPHIL # BLD AUTO: 0.18 THOUSAND/ΜL (ref 0–0.61)
EOSINOPHIL NFR BLD AUTO: 2 % (ref 0–6)
ERYTHROCYTE [DISTWIDTH] IN BLOOD BY AUTOMATED COUNT: 16 % (ref 11.6–15.1)
GFR SERPL CREATININE-BSD FRML MDRD: 89 ML/MIN/1.73SQ M
GLUCOSE SERPL-MCNC: 123 MG/DL (ref 65–140)
HCT VFR BLD AUTO: 38.2 % (ref 34.8–46.1)
HGB BLD-MCNC: 11.6 G/DL (ref 11.5–15.4)
IMM GRANULOCYTES # BLD AUTO: 0.11 THOUSAND/UL (ref 0–0.2)
IMM GRANULOCYTES NFR BLD AUTO: 1 % (ref 0–2)
LYMPHOCYTES # BLD AUTO: 1.71 THOUSANDS/ΜL (ref 0.6–4.47)
LYMPHOCYTES NFR BLD AUTO: 18 % (ref 14–44)
MAGNESIUM SERPL-MCNC: 2.2 MG/DL (ref 1.6–2.6)
MCH RBC QN AUTO: 26.1 PG (ref 26.8–34.3)
MCHC RBC AUTO-ENTMCNC: 30.4 G/DL (ref 31.4–37.4)
MCV RBC AUTO: 86 FL (ref 82–98)
MONOCYTES # BLD AUTO: 0.71 THOUSAND/ΜL (ref 0.17–1.22)
MONOCYTES NFR BLD AUTO: 8 % (ref 4–12)
NEUTROPHILS # BLD AUTO: 6.53 THOUSANDS/ΜL (ref 1.85–7.62)
NEUTS SEG NFR BLD AUTO: 71 % (ref 43–75)
NRBC BLD AUTO-RTO: 0 /100 WBCS
PLATELET # BLD AUTO: 360 THOUSANDS/UL (ref 149–390)
PMV BLD AUTO: 9.7 FL (ref 8.9–12.7)
POTASSIUM SERPL-SCNC: 4.4 MMOL/L (ref 3.5–5.3)
RBC # BLD AUTO: 4.45 MILLION/UL (ref 3.81–5.12)
SODIUM SERPL-SCNC: 141 MMOL/L (ref 136–145)
WBC # BLD AUTO: 9.28 THOUSAND/UL (ref 4.31–10.16)

## 2020-01-30 PROCEDURE — 99232 SBSQ HOSP IP/OBS MODERATE 35: CPT | Performed by: INTERNAL MEDICINE

## 2020-01-30 PROCEDURE — 80048 BASIC METABOLIC PNL TOTAL CA: CPT | Performed by: PHYSICIAN ASSISTANT

## 2020-01-30 PROCEDURE — 88342 IMHCHEM/IMCYTCHM 1ST ANTB: CPT | Performed by: PATHOLOGY

## 2020-01-30 PROCEDURE — 94640 AIRWAY INHALATION TREATMENT: CPT

## 2020-01-30 PROCEDURE — 94760 N-INVAS EAR/PLS OXIMETRY 1: CPT

## 2020-01-30 PROCEDURE — 99232 SBSQ HOSP IP/OBS MODERATE 35: CPT | Performed by: SURGERY

## 2020-01-30 PROCEDURE — 43239 EGD BIOPSY SINGLE/MULTIPLE: CPT | Performed by: INTERNAL MEDICINE

## 2020-01-30 PROCEDURE — C9113 INJ PANTOPRAZOLE SODIUM, VIA: HCPCS | Performed by: INTERNAL MEDICINE

## 2020-01-30 PROCEDURE — 88341 IMHCHEM/IMCYTCHM EA ADD ANTB: CPT | Performed by: PATHOLOGY

## 2020-01-30 PROCEDURE — 83735 ASSAY OF MAGNESIUM: CPT | Performed by: PHYSICIAN ASSISTANT

## 2020-01-30 PROCEDURE — 86301 IMMUNOASSAY TUMOR CA 19-9: CPT | Performed by: SURGERY

## 2020-01-30 PROCEDURE — 88305 TISSUE EXAM BY PATHOLOGIST: CPT | Performed by: PATHOLOGY

## 2020-01-30 PROCEDURE — 82378 CARCINOEMBRYONIC ANTIGEN: CPT | Performed by: SURGERY

## 2020-01-30 PROCEDURE — 74018 RADEX ABDOMEN 1 VIEW: CPT

## 2020-01-30 PROCEDURE — 85025 COMPLETE CBC W/AUTO DIFF WBC: CPT | Performed by: SURGERY

## 2020-01-30 PROCEDURE — C9113 INJ PANTOPRAZOLE SODIUM, VIA: HCPCS | Performed by: PHYSICIAN ASSISTANT

## 2020-01-30 RX ORDER — CALCIUM CHLORIDE 100 MG/ML
INJECTION INTRAVENOUS; INTRAVENTRICULAR AS NEEDED
Status: DISCONTINUED | OUTPATIENT
Start: 2020-01-30 | End: 2020-01-30 | Stop reason: SURG

## 2020-01-30 RX ORDER — SODIUM CHLORIDE 9 MG/ML
100 INJECTION, SOLUTION INTRAVENOUS CONTINUOUS
Status: DISCONTINUED | OUTPATIENT
Start: 2020-01-30 | End: 2020-01-30

## 2020-01-30 RX ORDER — FENTANYL CITRATE/PF 50 MCG/ML
25 SYRINGE (ML) INJECTION
Status: DISCONTINUED | OUTPATIENT
Start: 2020-01-30 | End: 2020-01-30 | Stop reason: ALTCHOICE

## 2020-01-30 RX ORDER — DIPHENHYDRAMINE HYDROCHLORIDE 50 MG/ML
INJECTION INTRAMUSCULAR; INTRAVENOUS AS NEEDED
Status: DISCONTINUED | OUTPATIENT
Start: 2020-01-30 | End: 2020-01-30 | Stop reason: SURG

## 2020-01-30 RX ORDER — LIDOCAINE HYDROCHLORIDE 10 MG/ML
INJECTION, SOLUTION EPIDURAL; INFILTRATION; INTRACAUDAL; PERINEURAL AS NEEDED
Status: DISCONTINUED | OUTPATIENT
Start: 2020-01-30 | End: 2020-01-30 | Stop reason: SURG

## 2020-01-30 RX ORDER — MEPERIDINE HYDROCHLORIDE 25 MG/ML
12.5 INJECTION INTRAMUSCULAR; INTRAVENOUS; SUBCUTANEOUS
Status: DISCONTINUED | OUTPATIENT
Start: 2020-01-30 | End: 2020-01-30 | Stop reason: HOSPADM

## 2020-01-30 RX ORDER — PROPOFOL 10 MG/ML
INJECTION, EMULSION INTRAVENOUS AS NEEDED
Status: DISCONTINUED | OUTPATIENT
Start: 2020-01-30 | End: 2020-01-30 | Stop reason: SURG

## 2020-01-30 RX ORDER — ESMOLOL HYDROCHLORIDE 10 MG/ML
INJECTION INTRAVENOUS AS NEEDED
Status: DISCONTINUED | OUTPATIENT
Start: 2020-01-30 | End: 2020-01-30 | Stop reason: SURG

## 2020-01-30 RX ORDER — SODIUM CHLORIDE, SODIUM LACTATE, POTASSIUM CHLORIDE, CALCIUM CHLORIDE 600; 310; 30; 20 MG/100ML; MG/100ML; MG/100ML; MG/100ML
INJECTION, SOLUTION INTRAVENOUS CONTINUOUS PRN
Status: DISCONTINUED | OUTPATIENT
Start: 2020-01-30 | End: 2020-01-30 | Stop reason: SURG

## 2020-01-30 RX ORDER — LEVALBUTEROL 1.25 MG/.5ML
1.25 SOLUTION, CONCENTRATE RESPIRATORY (INHALATION) ONCE
Status: DISCONTINUED | OUTPATIENT
Start: 2020-01-30 | End: 2020-01-30 | Stop reason: HOSPADM

## 2020-01-30 RX ORDER — METOCLOPRAMIDE HYDROCHLORIDE 5 MG/ML
INJECTION INTRAMUSCULAR; INTRAVENOUS AS NEEDED
Status: DISCONTINUED | OUTPATIENT
Start: 2020-01-30 | End: 2020-01-30 | Stop reason: SURG

## 2020-01-30 RX ORDER — ONDANSETRON 2 MG/ML
4 INJECTION INTRAMUSCULAR; INTRAVENOUS ONCE AS NEEDED
Status: DISCONTINUED | OUTPATIENT
Start: 2020-01-30 | End: 2020-01-30 | Stop reason: ALTCHOICE

## 2020-01-30 RX ORDER — ACETAMINOPHEN 160 MG/5ML
650 SUSPENSION, ORAL (FINAL DOSE FORM) ORAL EVERY 4 HOURS PRN
Status: DISCONTINUED | OUTPATIENT
Start: 2020-01-30 | End: 2020-02-10

## 2020-01-30 RX ORDER — SUCCINYLCHOLINE/SOD CL,ISO/PF 100 MG/5ML
SYRINGE (ML) INTRAVENOUS AS NEEDED
Status: DISCONTINUED | OUTPATIENT
Start: 2020-01-30 | End: 2020-01-30 | Stop reason: SURG

## 2020-01-30 RX ORDER — FENTANYL CITRATE 50 UG/ML
INJECTION, SOLUTION INTRAMUSCULAR; INTRAVENOUS AS NEEDED
Status: DISCONTINUED | OUTPATIENT
Start: 2020-01-30 | End: 2020-01-30 | Stop reason: SURG

## 2020-01-30 RX ORDER — SODIUM CHLORIDE 9 MG/ML
INJECTION, SOLUTION INTRAVENOUS CONTINUOUS PRN
Status: DISCONTINUED | OUTPATIENT
Start: 2020-01-30 | End: 2020-01-30 | Stop reason: SURG

## 2020-01-30 RX ORDER — FENTANYL CITRATE/PF 50 MCG/ML
50 SYRINGE (ML) INJECTION
Status: DISCONTINUED | OUTPATIENT
Start: 2020-01-30 | End: 2020-01-30 | Stop reason: ALTCHOICE

## 2020-01-30 RX ADMIN — Medication 100 MG: at 15:46

## 2020-01-30 RX ADMIN — HEPARIN SODIUM 5000 UNITS: 5000 INJECTION INTRAVENOUS; SUBCUTANEOUS at 05:33

## 2020-01-30 RX ADMIN — HYDROMORPHONE HYDROCHLORIDE 0.2 MG: 1 INJECTION, SOLUTION INTRAMUSCULAR; INTRAVENOUS; SUBCUTANEOUS at 02:34

## 2020-01-30 RX ADMIN — PROPOFOL 100 MG: 10 INJECTION, EMULSION INTRAVENOUS at 15:46

## 2020-01-30 RX ADMIN — SODIUM CHLORIDE 100 ML/HR: 0.9 INJECTION, SOLUTION INTRAVENOUS at 20:26

## 2020-01-30 RX ADMIN — DEXTRAN 70 AND HYPROMELLOSE 2910 1 DROP: 1; 3 SOLUTION/ DROPS OPHTHALMIC at 08:21

## 2020-01-30 RX ADMIN — CALCIUM CHLORIDE 0.1 G: 100 INJECTION, SOLUTION INTRAVENOUS; INTRAVENTRICULAR at 16:03

## 2020-01-30 RX ADMIN — DEXTROSE, SODIUM CHLORIDE, AND POTASSIUM CHLORIDE 100 ML/HR: 5; .45; .15 INJECTION INTRAVENOUS at 23:57

## 2020-01-30 RX ADMIN — ACETAMINOPHEN 650 MG: 160 SUSPENSION ORAL at 10:01

## 2020-01-30 RX ADMIN — METOPROLOL TARTRATE 5 MG: 5 INJECTION, SOLUTION INTRAVENOUS at 20:27

## 2020-01-30 RX ADMIN — METOPROLOL TARTRATE 5 MG: 5 INJECTION, SOLUTION INTRAVENOUS at 05:31

## 2020-01-30 RX ADMIN — FENTANYL CITRATE 25 MCG: 50 INJECTION, SOLUTION INTRAMUSCULAR; INTRAVENOUS at 16:30

## 2020-01-30 RX ADMIN — DEXTROSE, SODIUM CHLORIDE, AND POTASSIUM CHLORIDE 100 ML/HR: 5; .45; .15 INJECTION INTRAVENOUS at 04:52

## 2020-01-30 RX ADMIN — PHENYLEPHRINE HYDROCHLORIDE 200 MCG: 10 INJECTION INTRAVENOUS at 15:46

## 2020-01-30 RX ADMIN — BISACODYL 10 MG: 10 SUPPOSITORY RECTAL at 08:21

## 2020-01-30 RX ADMIN — IPRATROPIUM BROMIDE 0.5 MG: 0.5 SOLUTION RESPIRATORY (INHALATION) at 05:26

## 2020-01-30 RX ADMIN — HYDROMORPHONE HYDROCHLORIDE 0.2 MG: 1 INJECTION, SOLUTION INTRAMUSCULAR; INTRAVENOUS; SUBCUTANEOUS at 20:50

## 2020-01-30 RX ADMIN — PHENYLEPHRINE HYDROCHLORIDE 100 MCG: 10 INJECTION INTRAVENOUS at 16:57

## 2020-01-30 RX ADMIN — BACITRACIN ZINC, NEOMYCIN SULFATE, AND POLYMYXIN B SULFATE 1 SMALL APPLICATION: 400; 3.5; 5 OINTMENT TOPICAL at 08:14

## 2020-01-30 RX ADMIN — PHENYLEPHRINE HYDROCHLORIDE 100 MCG: 10 INJECTION INTRAVENOUS at 15:56

## 2020-01-30 RX ADMIN — PHENYLEPHRINE HYDROCHLORIDE 200 MCG: 10 INJECTION INTRAVENOUS at 16:58

## 2020-01-30 RX ADMIN — ESMOLOL HYDROCHLORIDE 100 MG: 100 INJECTION, SOLUTION INTRAVENOUS at 15:50

## 2020-01-30 RX ADMIN — IPRATROPIUM BROMIDE 0.5 MG: 0.5 SOLUTION RESPIRATORY (INHALATION) at 19:39

## 2020-01-30 RX ADMIN — SODIUM CHLORIDE 1000 ML: 0.9 INJECTION, SOLUTION INTRAVENOUS at 21:57

## 2020-01-30 RX ADMIN — PHENYLEPHRINE HYDROCHLORIDE 100 MCG: 10 INJECTION INTRAVENOUS at 15:48

## 2020-01-30 RX ADMIN — HYDROMORPHONE HYDROCHLORIDE 0.2 MG: 1 INJECTION, SOLUTION INTRAMUSCULAR; INTRAVENOUS; SUBCUTANEOUS at 23:50

## 2020-01-30 RX ADMIN — SODIUM CHLORIDE, SODIUM LACTATE, POTASSIUM CHLORIDE, AND CALCIUM CHLORIDE: .6; .31; .03; .02 INJECTION, SOLUTION INTRAVENOUS at 15:41

## 2020-01-30 RX ADMIN — DILTIAZEM HYDROCHLORIDE 9 MG/HR: 5 INJECTION INTRAVENOUS at 08:13

## 2020-01-30 RX ADMIN — PROPOFOL 20 MG: 10 INJECTION, EMULSION INTRAVENOUS at 16:05

## 2020-01-30 RX ADMIN — DIPHENHYDRAMINE HYDROCHLORIDE 12.5 MG: 50 INJECTION, SOLUTION INTRAMUSCULAR; INTRAVENOUS at 17:31

## 2020-01-30 RX ADMIN — LEVALBUTEROL HYDROCHLORIDE 1.25 MG: 1.25 SOLUTION, CONCENTRATE RESPIRATORY (INHALATION) at 19:39

## 2020-01-30 RX ADMIN — LEVALBUTEROL HYDROCHLORIDE 1.25 MG: 1.25 SOLUTION, CONCENTRATE RESPIRATORY (INHALATION) at 13:15

## 2020-01-30 RX ADMIN — PHENYLEPHRINE HYDROCHLORIDE 150 MCG: 10 INJECTION INTRAVENOUS at 16:16

## 2020-01-30 RX ADMIN — ESMOLOL HYDROCHLORIDE 100 MG: 100 INJECTION, SOLUTION INTRAVENOUS at 15:53

## 2020-01-30 RX ADMIN — PANTOPRAZOLE SODIUM 40 MG: 40 INJECTION, POWDER, FOR SOLUTION INTRAVENOUS at 08:14

## 2020-01-30 RX ADMIN — SODIUM CHLORIDE: 0.9 INJECTION, SOLUTION INTRAVENOUS at 16:38

## 2020-01-30 RX ADMIN — PANTOPRAZOLE SODIUM 40 MG: 40 INJECTION, POWDER, FOR SOLUTION INTRAVENOUS at 20:26

## 2020-01-30 RX ADMIN — PHENYLEPHRINE HYDROCHLORIDE 100 MCG: 10 INJECTION INTRAVENOUS at 15:53

## 2020-01-30 RX ADMIN — LEVALBUTEROL HYDROCHLORIDE 1.25 MG: 1.25 SOLUTION, CONCENTRATE RESPIRATORY (INHALATION) at 05:25

## 2020-01-30 RX ADMIN — ESMOLOL HYDROCHLORIDE 30 MG: 100 INJECTION, SOLUTION INTRAVENOUS at 15:48

## 2020-01-30 RX ADMIN — FENTANYL CITRATE 25 MCG: 50 INJECTION, SOLUTION INTRAMUSCULAR; INTRAVENOUS at 16:53

## 2020-01-30 RX ADMIN — DEXTRAN 70 AND HYPROMELLOSE 2910 1 DROP: 1; 3 SOLUTION/ DROPS OPHTHALMIC at 20:23

## 2020-01-30 RX ADMIN — METOCLOPRAMIDE 10 MG: 5 INJECTION, SOLUTION INTRAMUSCULAR; INTRAVENOUS at 16:14

## 2020-01-30 RX ADMIN — METOPROLOL TARTRATE 5 MG: 5 INJECTION, SOLUTION INTRAVENOUS at 12:54

## 2020-01-30 RX ADMIN — PHENYLEPHRINE HYDROCHLORIDE 50 MCG/MIN: 10 INJECTION INTRAVENOUS at 15:55

## 2020-01-30 RX ADMIN — HEPARIN SODIUM 5000 UNITS: 5000 INJECTION INTRAVENOUS; SUBCUTANEOUS at 20:39

## 2020-01-30 RX ADMIN — HEPARIN SODIUM 5000 UNITS: 5000 INJECTION INTRAVENOUS; SUBCUTANEOUS at 13:00

## 2020-01-30 RX ADMIN — IPRATROPIUM BROMIDE 0.5 MG: 0.5 SOLUTION RESPIRATORY (INHALATION) at 13:15

## 2020-01-30 RX ADMIN — SODIUM CHLORIDE 100 ML/HR: 0.9 INJECTION, SOLUTION INTRAVENOUS at 18:19

## 2020-01-30 RX ADMIN — LIDOCAINE HYDROCHLORIDE 80 MG: 10 INJECTION, SOLUTION EPIDURAL; INFILTRATION; INTRACAUDAL; PERINEURAL at 15:46

## 2020-01-30 NOTE — PROGRESS NOTES
Pt still in afib with HR in 140's-160's without improvement from evening medications of now IV 5 mg Lopressor and 250 mcg Digoxin  Pt was then given PRN 5 mg IV Metoprolol and still without improvement  Pt asymptomatic and /81  Dr Isaura Herrera with Cardiology made aware and Cardizem gtts now ordered  Will continue to monitor

## 2020-01-30 NOTE — ANESTHESIA POSTPROCEDURE EVALUATION
Post-Op Assessment Note    CV Status:  Stable    Pain management: adequate     Mental Status:  Awake   Hydration Status:  Stable   PONV Controlled:  Controlled   Airway Patency:  Patent   Post Op Vitals Reviewed: Yes      Staff: Anesthesiologist, CRNA           BP 96/54 (01/30/20 1800)    Temp 98 3 °F (36 8 °C) (01/30/20 1755)    Pulse 69 (01/30/20 1755)   Resp   14   SpO2   94

## 2020-01-30 NOTE — PROGRESS NOTES
Progress Note - Pastor Garcia 82 y o  female MRN: [id number]    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 - possible Malignancy vs peptic stricture  S/p EGD 1/21 showing food in stomach and were unable to complete procedure    Unable to complete EGD x2 days in a row 2/2 Afib rvr  Now on IV metoprolol and cardizem gtt  HR more controlled 100s this AM  Saturating well on 2LNC  Abd soft/ nontender/ non distended  Plan:  EGD today  NPO  SQH    Subjective:   No acute events overnight  Denied fever, chills, chest pain, shortness of breath, nausea, vomiting, or abdominal pain this morning  Objective:     Vitals: Blood pressure 107/57, pulse 104, temperature 98 3 °F (36 8 °C), resp  rate 18, height 5' (1 524 m), weight 62 4 kg (137 lb 9 1 oz), SpO2 94 %, not currently breastfeeding  ,Body mass index is 26 87 kg/m²  Intake/Output Summary (Last 24 hours) at 1/30/2020 0616  Last data filed at 1/30/2020 0421  Gross per 24 hour   Intake 928 12 ml   Output 3315 ml   Net -2386 88 ml       Physical Exam  General: NAD  HEENT: NC/AT  MMM  Cv: RRR     Lungs: normal effort  Ab: Soft, NT/ND  Ex: no CCE  Neuro: AAOx3    Scheduled Meds:  Current Facility-Administered Medications:  acetaminophen 650 mg Rectal Q6H PRN Deb Stafford PA-C    albuterol 2 5 mg Nebulization Q4H PRN Td Yuan DO    bisacodyl 10 mg Rectal Daily Deb Stafford PA-C    dextran 70-hypromellose 1 drop Both Eyes TID Deb Stafford PA-C    dextrose 5 % and sodium chloride 0 45 % with KCl 20 mEq/L 100 mL/hr Intravenous Continuous Paxton Barragan DO Last Rate: 100 mL/hr (01/30/20 0452)   diltiazem 1-15 mg/hr Intravenous Continuous Ortiz Swann MD Last Rate: 9 mg/hr (01/29/20 5014)   heparin (porcine) 5,000 Units Subcutaneous Q8H [facility] Deb Stafford PA-C    HYDROmorphone 0 2 mg Intravenous Q3H PRN Paxton Barragan, DO    HYDROmorphone 0 5 mg Intravenous Q3H PRN Surjit Talbot, DO    ipratropium 0 5 mg Nebulization TID Rigoberto Xavier, DO    levalbuterol 1 25 mg Nebulization TID Rigoberto Xavier, DO    LORazepam 0 25 mg Intravenous HS PRN Kesha Weiss PA-C    metoprolol 5 mg Intravenous Q6H PRN Katrinka Sever, MD    metoprolol 5 mg Intravenous Q6H Katrinka Sever, MD    neomycin-bacitracin-polymyxin b 1 small application Topical Daily Kesha Weiss PA-C    ondansetron 4 mg Intravenous Q6H PRN Kesha Weiss PA-C    pantoprazole 40 mg Intravenous Q12H [address] Kesha Weiss PA-C    phenol 1 spray Mouth/Throat Q2H PRN Kendra Duane, DO    saliva substitute 5 spray Mouth/Throat 4x Daily PRN Kesha Weiss PA-C      Facility-Administered Medications Ordered in Other Encounters:  metoprolol   PRN Mirta Yu CRNA     Continuous Infusions:  dextrose 5 % and sodium chloride 0 45 % with KCl 20 mEq/L 100 mL/hr Last Rate: 100 mL/hr (01/30/20 2202)   diltiazem 1-15 mg/hr Last Rate: 9 mg/hr (01/29/20 7617)     PRN Meds:   acetaminophen    albuterol    HYDROmorphone    HYDROmorphone    LORazepam    metoprolol    ondansetron    phenol    saliva substitute      Invasive Devices     Peripheral Intravenous Line            Peripheral IV 01/28/20 Dorsal (posterior); Left Forearm 1 day    Peripheral IV 01/29/20 Right Antecubital less than 1 day          Drain            External Urinary Catheter less than 1 day                Lab, Imaging and other studies: I have personally reviewed pertinent reports      VTE Pharmacologic Prophylaxis: Heparin  VTE Mechanical Prophylaxis: sequential compression device

## 2020-01-30 NOTE — NUTRITION
01/30/20 1238   Recommendations/Interventions   Summary Patient remains NPO x8 days, NGT removed  Patient with failed EGD x2  Possible UGI swallow study to be completed per surgical PA  Right and left lower extremity trace edema noted  Nursing skin care plan reviewed  Malnutrition/BMI Present   (Asked PCA to obtain current standing scale weight  Patient does meet criteria <50% energy intake needs met >5 days  )   Nutrition Recommendations Initiate EN/PN;Lab - consider order (specify)  (Secondary to prolonged NPO status suggest initiate standard TPN for day one and then increase to 750 ml 10% aa, 600 ml 40% dex, 250 ml 20% lipids  Monitor electrolytes   When medically indicated suggest advance diet to clear liquid  )

## 2020-01-30 NOTE — ANESTHESIA PREPROCEDURE EVALUATION
Review of Systems/Medical History  Patient summary reviewed  Chart reviewed  No history of anesthetic complications     Cardiovascular  Exercise tolerance (METS): >4,  Dysrhythmias , atrial fibrillation, CHF , NYHA Classification: I compensated CHF,   Comment: Case canceled twice for afib with RVR, poorly controlled    TTE 10/2019:  LEFT VENTRICLE: Size was normal  Systolic function was normal  Ejection fraction was estimated to be 60 %  There were no regional wall motion abnormalities  Wall thickness was mildly increased  There was mild concentric hypertrophy  DOPPLER: Doppler parameters were consistent with abnormal left ventricular relaxation (grade 1 diastolic dysfunction)  RIGHT VENTRICLE: The size was normal  Systolic function was normal  Wall thickness was normal     LEFT ATRIUM: Size was normal     RIGHT ATRIUM: Size was normal     MITRAL VALVE: There was mild annular calcification  Valve structure was normal  There was normal leaflet separation  DOPPLER: The transmitral velocity was within the normal range  There was no evidence for stenosis  There was trace  regurgitation  AORTIC VALVE: The valve was not well visualized  DOPPLER: Transaortic velocity was within the normal range  There was no evidence for stenosis  There was mild regurgitation  TRICUSPID VALVE: The valve structure was normal  There was normal leaflet separation  DOPPLER: The transtricuspid velocity was within the normal range  There was no evidence for stenosis  There was trace regurgitation  Pulmonary artery  systolic pressure was moderately increased  Estimated peak PA pressure was 65 mmHg  The findings suggest moderate pulmonary hypertension  PULMONIC VALVE: Not well visualized  DOPPLER: The transpulmonic velocity was within the normal range  There was no significant regurgitation      ,  Pulmonary  Smoker ex-smoker  , COPD , Asthma ,        GI/Hepatic    GERD ,   Comment: Gastric outlet obstruction     Negative  ROS No chronic kidney disease ,        Endo/Other  Negative endo/other ROS      GYN  Negative gynecology ROS          Hematology  Negative hematology ROS      Musculoskeletal  Back pain , thoracic pain and lumbar pain, Sciatica, Gout,   Arthritis     Neurology  Negative neurology ROS      Psychology   Anxiety,              Physical Exam    Airway    Mallampati score: II  TM Distance: >3 FB  Neck ROM: full     Dental   No notable dental hx upper dentures and lower dentures,     Cardiovascular  Rhythm: regular, Rate: normal, Cardiovascular exam normal    Pulmonary  Pulmonary exam normal Breath sounds clear to auscultation,     Other Findings        Anesthesia Plan  ASA Score- 4     Anesthesia Type- general with ASA Monitors  Additional Monitors:   Airway Plan: ETT      Comment: Recent labs personally reviewed:  Lab Results       Component                Value               Date                       WBC                      9 28                01/30/2020                 HGB                      11 6                01/30/2020                 PLT                      360                 01/30/2020            Lab Results       Component                Value               Date                       NA                       143                 01/08/2016                 K                        4 4                 01/30/2020                 BUN                      1 (L)               01/30/2020                 CREATININE               0 53 (L)            01/30/2020                 GLUCOSE                  77                  01/08/2016            Lab Results       Component                Value               Date                       PTT                      28                  01/22/2020             Lab Results       Component                Value               Date                       INR                      1 09                01/22/2020              Blood type       I, Jevon Najera MD, have personally seen and evaluated the patient prior to anesthetic care  I have reviewed the pre-anesthetic record, medical history, allergies, medications and any other medical records if appropriate to the anesthetic care  If a CRNA is involved in the case, I have reviewed the CRNA assessment, if present, and agree  I discussed the anesthetic plan and risks/benefits/alternatives with the patient including possible PONV, sore throat, and possibility of rare anesthetic and surgical emergencies        Plan Factors-  Patient did not smoke on day of surgery  Induction- intravenous  Postoperative Plan-     Informed Consent- Anesthetic plan and risks discussed with patient  I personally reviewed this patient with the CRNA  Discussed and agreed on the Anesthesia Plan with the CRNA  [facility] [facility]         Results Reviewed     None

## 2020-01-30 NOTE — PROGRESS NOTES
Cardiology Progress Note - Sukumar Marie 82 y o  female MRN: [id number]    Unit/Bed#: [facility] 802-01 Encounter: [id number]    Assessment and plan  1  Paroxysmal atrial fibrillation with RVR  2  Gastric outlet obstruction  3  Preserved left ventricular ejection fraction  4  COPD    Recommendations:  Continue current IV medications heart rate is well controlled  She is at acceptable CV risk for procedure  Resume anticoagulation when safe from a surgical standpoint  Once she has been taking orals for a day or 2 would resume oral medications to make sure there was work well  Subjective:    No significant events overnight  Complains of a headache  Denies chest pain or shortness of breath  Patient was upset this morning about some events that happened in the a m  But calmed down quickly  ROS    Objective:   Vitals: Blood pressure 106/72, pulse 85, temperature (!) 97 3 °F (36 3 °C), resp  rate 20, height 5' (1 524 m), weight 62 4 kg (137 lb 9 1 oz), SpO2 93 %, not currently breastfeeding , Body mass index is 26 87 kg/m² , Orthostatic Blood Pressures      Most Recent Value   Blood Pressure  106/72 filed at 01/30/2020 0731   Patient Position - Orthostatic VS  Lying filed at 01/29/2020 6395         Systolic (56YRG), XXE:343 , Min:73 , LOB:418     Diastolic (04FDP), DQC:05, Min:46, Max:98      Intake/Output Summary (Last 24 hours) at 1/30/2020 0859  Last data filed at 1/30/2020 0731  Gross per 24 hour   Intake 928 12 ml   Output 3415 ml   Net -2486 88 ml     Weight (last 2 days)     None            Telemetry Review: No significant arrhythmias seen on telemetry review  EKG personally reviewed by Jenaro Swann DO  Physical Exam   Constitutional: She is oriented to person, place, and time  She appears well-developed and well-nourished  No distress  HENT:   Head: Normocephalic and atraumatic  Eyes: Pupils are equal, round, and reactive to light  Conjunctivae are normal    Neck: Neck supple  Cardiovascular: Normal rate and normal heart sounds  An irregularly irregular rhythm present  Exam reveals no friction rub  No murmur heard  Pulmonary/Chest: Effort normal and breath sounds normal  No respiratory distress  She has no wheezes  She has no rales  Abdominal: Soft  Bowel sounds are normal  She exhibits no distension  There is no tenderness  There is no rebound  Musculoskeletal: Normal range of motion  She exhibits no edema, tenderness or deformity  Neurological: She is alert and oriented to person, place, and time  No cranial nerve deficit  Skin: Skin is warm and dry  No erythema  Psychiatric: She has a normal mood and affect  Nursing note and vitals reviewed          Laboratory Results:        CBC with diff: Results from last 7 days   Lab Units 01/30/20 0449 01/29/20  0322 01/28/20  0525 01/27/20  0606 01/26/20  0554 01/25/20  0439 01/24/20  0519   WBC Thousand/uL 9 28 6 84 8 96 9 56 11 68* 16 16* 14 20*   HEMOGLOBIN g/dL 11 6 11 3* 11 7 12 0 12 4 13 0 11 2*   HEMATOCRIT % 38 2 37 9 38 0 39 4 40 0 41 7 36 7   MCV fL 86 87 86 86 85 84 86   PLATELETS Thousands/uL 360 309 317 319 306 272 202   MCH pg 26 1* 25 8* 26 5* 26 2* 26 2* 26 2* 26 2*   MCHC g/dL 30 4* 29 8* 30 8* 30 5* 31 0* 31 2* 30 5*   RDW % 16 0* 15 9* 16 2* 16 1* 15 8* 15 9* 15 6*   MPV fL 9 7 9 8 10 4 10 1 10 1 10 5 10 1   NRBC AUTO /100 WBCs 0 0  --  0 0 0 0         CMP:  Results from last 7 days   Lab Units 01/30/20 0449 01/29/20  0643 01/28/20  0525 01/27/20  0606 01/26/20  0554 01/25/20  0439 01/24/20  0519   POTASSIUM mmol/L 4 4 3 7 4 0 3 6 3 9 3 7 4 4   CHLORIDE mmol/L 109* 109* 109* 107 106 103 103   CO2 mmol/L 27 26 25 26 23 25 22   BUN mg/dL 1* 1* 2* 4* 6 6 6   CREATININE mg/dL 0 53* 0 52* 0 49* 0 49* 0 50* 0 47* 0 33*   CALCIUM mg/dL 9 0 9 0 9 2 9 0 9 0 9 3 8 7   EGFR ml/min/1 73sq m 89 89 91 91 90 92 104         BMP:  Results from last 7 days   Lab Units 01/30/20  0449 01/29/20  9906 01/28/20  0525 01/27/20  0606 20  0554 20  0439 20  0519   POTASSIUM mmol/L 4 4 3 7 4 0 3 6 3 9 3 7 4 4   CHLORIDE mmol/L 109* 109* 109* 107 106 103 103   CO2 mmol/L 27 26 25 26 23 25 22   BUN mg/dL 1* 1* 2* 4* 6 6 6   CREATININE mg/dL 0 53* 0 52* 0 49* 0 49* 0 50* 0 47* 0 33*   CALCIUM mg/dL 9 0 9 0 9 2 9 0 9 0 9 3 8 7       BNP: No results for input(s): BNP in the last 72 hours  Magnesium:   Results from last 7 days   Lab Units 20  0449 20  0643 20  0519   MAGNESIUM mg/dL 2 2 1 7 2 2       Coags:       TSH:        Hemoglobin A1C       Lipid Profile:       Cardiac testing:   Results for orders placed during the hospital encounter of 10/06/19   Echo complete with contrast if indicated    [address]    Transthoracic Echocardiogram  2D, M-mode, Doppler, and Color Doppler    Study date:  07-Oct-2019    Patient: Hannah Stevens  MR number: [id number]  Account number: [de-identified]  : 1935  Age: 82 years  Gender: Female  Status: Inpatient  [facility]: H[facility]   Height: 60 in  Weight: 164 lb  BP: 88/ 53 mmHg    Indications: Afib    Diagnoses: I48 0 - Atrial fibrillation    Sonographer:  Chuck Partida RDCS  Referring Physician:  Sriram Ospina MD  Group:  [facility]  Interpreting Physician:  Sb Ron MD    SUMMARY    LEFT VENTRICLE:  Systolic function was normal  Ejection fraction was estimated to be 60 %  There were no regional wall motion abnormalities  There was mild concentric hypertrophy  Doppler parameters were consistent with abnormal left ventricular relaxation (grade 1 diastolic dysfunction)  MITRAL VALVE:  There was mild annular calcification  There was trace regurgitation  AORTIC VALVE:  There was mild regurgitation  TRICUSPID VALVE:  There was trace regurgitation  HISTORY: PRIOR HISTORY: Congestive heart failure  Atrial fibrillation   Chronic lung disease  PROCEDURE: The study was performed in the [facility]  This was a routine study  The transthoracic approach was used  The study included complete 2D imaging, M-mode, complete spectral Doppler, and color Doppler  The  heart rate was 92 bpm, at the start of the study  Images were obtained from the parasternal, apical, subcostal, and suprasternal notch acoustic windows  Echocardiographic views were limited due to poor acoustic window availability,  decreased penetration, and lung interference  This was a technically difficult study  LEFT VENTRICLE: Size was normal  Systolic function was normal  Ejection fraction was estimated to be 60 %  There were no regional wall motion abnormalities  Wall thickness was mildly increased  There was mild concentric hypertrophy  DOPPLER: Doppler parameters were consistent with abnormal left ventricular relaxation (grade 1 diastolic dysfunction)  RIGHT VENTRICLE: The size was normal  Systolic function was normal  Wall thickness was normal     LEFT ATRIUM: Size was normal     RIGHT ATRIUM: Size was normal     MITRAL VALVE: There was mild annular calcification  Valve structure was normal  There was normal leaflet separation  DOPPLER: The transmitral velocity was within the normal range  There was no evidence for stenosis  There was trace  regurgitation  AORTIC VALVE: The valve was not well visualized  DOPPLER: Transaortic velocity was within the normal range  There was no evidence for stenosis  There was mild regurgitation  TRICUSPID VALVE: The valve structure was normal  There was normal leaflet separation  DOPPLER: The transtricuspid velocity was within the normal range  There was no evidence for stenosis  There was trace regurgitation  Pulmonary artery  systolic pressure was moderately increased  Estimated peak PA pressure was 65 mmHg  The findings suggest moderate pulmonary hypertension  PULMONIC VALVE: Not well visualized   DOPPLER: The transpulmonic velocity was within the normal range  There was no significant regurgitation  PERICARDIUM: There was no pericardial effusion  The pericardium was normal in appearance  AORTA: The root exhibited normal size  SYSTEMIC VEINS: IVC: The inferior vena cava was upper normal     SYSTEM MEASUREMENT TABLES    2D  %FS: 30 38 %  AV Diam: 3 11 cm  EDV(Teich): 61 91 ml  EF(Teich): 58 53 %  ESV(Teich): 25 68 ml  IVSd: 1 15 cm  LA Area: 16 7 cm2  LA Diam: 3 58 cm  LVEDV MOD A4C: 81 9 ml  LVEF MOD A4C: 56 03 %  LVESV MOD A4C: 36 01 ml  LVIDd: 3 8 cm  LVIDs: 2 65 cm  LVLd A4C: 6 83 cm  LVLs A4C: 5 86 cm  LVPWd: 1 12 cm  RA Area: 14 56 cm2  RV Diam : 3 27 cm  SV MOD A4C: 45 89 ml  SV(Cube): 36 32 ml  SV(Teich): 36 24 ml    CW  AR Dec [place]: 1 94 m/s2  AR Dec Time: 1840 69 ms  AR PHT: 533 8 ms  AR Vmax: 3 58 m/s  AR maxP 22 mmHg  TR Vmax: 3 49 m/s  TR maxP 16 mmHg    MM  TAPSE: 2 16 cm    PW  E': 0 07 m/s  E/E': 13 57  MV A [place]: 1 44 m/s  MV Dec [place]: 2 9 m/s2  MV DecT: 337 71 ms  MV E Rodrig[place]: 0 98 m/s  MV E/A Ratio: 0 68    [facility]    Prepared and electronically signed by    Yana Rodriguez MD  Signed 07-Oct-2019 21:51:59       No results found for this or any previous visit  No results found for this or any previous visit  No results found for this or any previous visit      Meds/Allergies   all current active meds have been reviewed  Medications Prior to Admission   Medication    albuterol (PROVENTIL HFA,VENTOLIN HFA) 90 mcg/act inhaler    alendronate (FOSAMAX) 70 mg tablet    ALPRAZolam (XANAX) 0 5 mg tablet    apixaban (ELIQUIS) 5 mg    Calcium Carb-Cholecalciferol (CALCIUM 1000 + D PO)    cholestyramine sugar free (QUESTRAN LIGHT) 4 g packet    diltiazem (CARDIZEM CD) 240 mg 24 hr capsule    fluticasone-umeclidinium-vilanterol (TRELEGY ELLIPTA) 100-62 5-25 MCG/INH inhaler    ipratropium-albuterol (DUO-NEB) 0 5-2 5 mg/3 mL nebulizer solution    iron polysaccharides (FERREX) 150 mg capsule    loperamide (IMODIUM) 2 mg capsule    metoprolol tartrate (LOPRESSOR) 25 mg tablet    montelukast (SINGULAIR) 10 mg tablet    pantoprazole (PROTONIX) 40 mg tablet    potassium chloride (MICRO-K) 10 MEQ CR capsule    psyllium (METAMUCIL) packet         dextrose 5 % and sodium chloride 0 45 % with KCl 20 mEq/L 100 mL/hr Last Rate: 100 mL/hr (01/30/20 0452)   diltiazem 1-15 mg/hr Last Rate: 9 mg/hr (01/30/20 0858)     Assessment:  Principal Problem:    Gastric outlet obstruction  Active Problems:    COPD (chronic obstructive pulmonary disease) (HCC)    Colorectal polyps    Chronic low back pain    Atrial fibrillation with rapid ventricular response (HCC)    Compression fracture of L4 vertebra (HCC)    Closed wedge compression fracture of T12 vertebra (HCC)

## 2020-01-30 NOTE — PLAN OF CARE
Problem: Nutrition/Hydration-ADULT  Goal: Nutrient/Hydration intake appropriate for improving, restoring or maintaining nutritional needs  Description  Monitor and assess patient's nutrition/hydration status for malnutrition  Collaborate with interdisciplinary team and initiate plan and interventions as ordered  Monitor patient's weight and dietary intake as ordered or per policy  Utilize nutrition screening tool and intervene as necessary  Determine patient's food preferences and provide high-protein, high-caloric foods as appropriate  INTERVENTIONS:  - Monitor oral intake, urinary output, labs, and treatment plans  - Assess nutrition and hydration status and recommend course of action  - Evaluate amount of meals eaten  - Assist patient with eating if necessary   - Allow adequate time for meals  - Recommend/ encourage appropriate diets, oral nutritional supplements, and vitamin/mineral supplements  - Order, calculate, and assess calorie counts as needed  - Recommend, monitor, and adjust tube feedings and TPN/PPN based on assessed needs  - Assess need for intravenous fluids  - Provide specific nutrition/hydration education as appropriate  - Include patient/family/caregiver in decisions related to nutrition  Outcome: Not Progressing   Patient remains NPO x8 days, TPN recommendations provided

## 2020-01-31 ENCOUNTER — APPOINTMENT (INPATIENT)
Dept: RADIOLOGY | Facility: HOSPITAL | Age: 85
DRG: 326 | End: 2020-01-31
Payer: MEDICARE

## 2020-01-31 LAB
ALBUMIN SERPL BCP-MCNC: 2.5 G/DL (ref 3.5–5)
ALP SERPL-CCNC: 94 U/L (ref 46–116)
ALT SERPL W P-5'-P-CCNC: 30 U/L (ref 12–78)
ANION GAP SERPL CALCULATED.3IONS-SCNC: 4 MMOL/L (ref 4–13)
ANION GAP SERPL CALCULATED.3IONS-SCNC: 5 MMOL/L (ref 4–13)
AST SERPL W P-5'-P-CCNC: 17 U/L (ref 5–45)
BASOPHILS # BLD AUTO: 0.02 THOUSANDS/ΜL (ref 0–0.1)
BASOPHILS NFR BLD AUTO: 0 % (ref 0–1)
BILIRUB SERPL-MCNC: 0.46 MG/DL (ref 0.2–1)
BUN SERPL-MCNC: 2 MG/DL (ref 5–25)
BUN SERPL-MCNC: 2 MG/DL (ref 5–25)
CALCIUM SERPL-MCNC: 8.4 MG/DL (ref 8.3–10.1)
CALCIUM SERPL-MCNC: 8.6 MG/DL (ref 8.3–10.1)
CHLORIDE SERPL-SCNC: 105 MMOL/L (ref 100–108)
CHLORIDE SERPL-SCNC: 106 MMOL/L (ref 100–108)
CO2 SERPL-SCNC: 27 MMOL/L (ref 21–32)
CO2 SERPL-SCNC: 28 MMOL/L (ref 21–32)
CREAT SERPL-MCNC: 0.56 MG/DL (ref 0.6–1.3)
CREAT SERPL-MCNC: 0.6 MG/DL (ref 0.6–1.3)
EOSINOPHIL # BLD AUTO: 0.03 THOUSAND/ΜL (ref 0–0.61)
EOSINOPHIL NFR BLD AUTO: 0 % (ref 0–6)
ERYTHROCYTE [DISTWIDTH] IN BLOOD BY AUTOMATED COUNT: 16.1 % (ref 11.6–15.1)
GFR SERPL CREATININE-BSD FRML MDRD: 85 ML/MIN/1.73SQ M
GFR SERPL CREATININE-BSD FRML MDRD: 87 ML/MIN/1.73SQ M
GLUCOSE SERPL-MCNC: 119 MG/DL (ref 65–140)
GLUCOSE SERPL-MCNC: 125 MG/DL (ref 65–140)
HCT VFR BLD AUTO: 35.9 % (ref 34.8–46.1)
HGB BLD-MCNC: 10.6 G/DL (ref 11.5–15.4)
IMM GRANULOCYTES # BLD AUTO: 0.13 THOUSAND/UL (ref 0–0.2)
IMM GRANULOCYTES NFR BLD AUTO: 1 % (ref 0–2)
LYMPHOCYTES # BLD AUTO: 1.09 THOUSANDS/ΜL (ref 0.6–4.47)
LYMPHOCYTES NFR BLD AUTO: 8 % (ref 14–44)
MAGNESIUM SERPL-MCNC: 1.8 MG/DL (ref 1.6–2.6)
MCH RBC QN AUTO: 25.7 PG (ref 26.8–34.3)
MCHC RBC AUTO-ENTMCNC: 29.5 G/DL (ref 31.4–37.4)
MCV RBC AUTO: 87 FL (ref 82–98)
MONOCYTES # BLD AUTO: 0.5 THOUSAND/ΜL (ref 0.17–1.22)
MONOCYTES NFR BLD AUTO: 4 % (ref 4–12)
NEUTROPHILS # BLD AUTO: 12.08 THOUSANDS/ΜL (ref 1.85–7.62)
NEUTS SEG NFR BLD AUTO: 87 % (ref 43–75)
NRBC BLD AUTO-RTO: 0 /100 WBCS
PHOSPHATE SERPL-MCNC: 3.9 MG/DL (ref 2.3–4.1)
PLATELET # BLD AUTO: 298 THOUSANDS/UL (ref 149–390)
PMV BLD AUTO: 9.5 FL (ref 8.9–12.7)
POTASSIUM SERPL-SCNC: 3.6 MMOL/L (ref 3.5–5.3)
POTASSIUM SERPL-SCNC: 4 MMOL/L (ref 3.5–5.3)
PROT SERPL-MCNC: 6.7 G/DL (ref 6.4–8.2)
RBC # BLD AUTO: 4.13 MILLION/UL (ref 3.81–5.12)
SODIUM SERPL-SCNC: 137 MMOL/L (ref 136–145)
SODIUM SERPL-SCNC: 138 MMOL/L (ref 136–145)
TRIGL SERPL-MCNC: 151 MG/DL
WBC # BLD AUTO: 13.85 THOUSAND/UL (ref 4.31–10.16)

## 2020-01-31 PROCEDURE — 94760 N-INVAS EAR/PLS OXIMETRY 1: CPT

## 2020-01-31 PROCEDURE — 85025 COMPLETE CBC W/AUTO DIFF WBC: CPT | Performed by: INTERNAL MEDICINE

## 2020-01-31 PROCEDURE — 99232 SBSQ HOSP IP/OBS MODERATE 35: CPT | Performed by: INTERNAL MEDICINE

## 2020-01-31 PROCEDURE — 80053 COMPREHEN METABOLIC PANEL: CPT | Performed by: SURGERY

## 2020-01-31 PROCEDURE — 87040 BLOOD CULTURE FOR BACTERIA: CPT | Performed by: SURGERY

## 2020-01-31 PROCEDURE — 71045 X-RAY EXAM CHEST 1 VIEW: CPT

## 2020-01-31 PROCEDURE — 87186 SC STD MICRODIL/AGAR DIL: CPT | Performed by: SURGERY

## 2020-01-31 PROCEDURE — C9113 INJ PANTOPRAZOLE SODIUM, VIA: HCPCS | Performed by: INTERNAL MEDICINE

## 2020-01-31 PROCEDURE — 94640 AIRWAY INHALATION TREATMENT: CPT

## 2020-01-31 PROCEDURE — 80048 BASIC METABOLIC PNL TOTAL CA: CPT | Performed by: INTERNAL MEDICINE

## 2020-01-31 PROCEDURE — 99222 1ST HOSP IP/OBS MODERATE 55: CPT | Performed by: SURGERY

## 2020-01-31 PROCEDURE — 83735 ASSAY OF MAGNESIUM: CPT | Performed by: SURGERY

## 2020-01-31 PROCEDURE — 87086 URINE CULTURE/COLONY COUNT: CPT | Performed by: SURGERY

## 2020-01-31 PROCEDURE — 99232 SBSQ HOSP IP/OBS MODERATE 35: CPT | Performed by: SURGERY

## 2020-01-31 PROCEDURE — 87077 CULTURE AEROBIC IDENTIFY: CPT | Performed by: SURGERY

## 2020-01-31 PROCEDURE — 84478 ASSAY OF TRIGLYCERIDES: CPT | Performed by: SURGERY

## 2020-01-31 PROCEDURE — 84100 ASSAY OF PHOSPHORUS: CPT | Performed by: SURGERY

## 2020-01-31 RX ORDER — SIMETHICONE 80 MG
80 TABLET,CHEWABLE ORAL EVERY 6 HOURS PRN
Status: DISCONTINUED | OUTPATIENT
Start: 2020-01-31 | End: 2020-02-14 | Stop reason: HOSPADM

## 2020-01-31 RX ORDER — ECHINACEA PURPUREA EXTRACT 125 MG
1 TABLET ORAL
Status: DISCONTINUED | OUTPATIENT
Start: 2020-01-31 | End: 2020-02-14 | Stop reason: HOSPADM

## 2020-01-31 RX ADMIN — HYDROMORPHONE HYDROCHLORIDE 0.2 MG: 1 INJECTION, SOLUTION INTRAMUSCULAR; INTRAVENOUS; SUBCUTANEOUS at 16:35

## 2020-01-31 RX ADMIN — DEXTRAN 70 AND HYPROMELLOSE 2910 1 DROP: 1; 3 SOLUTION/ DROPS OPHTHALMIC at 08:32

## 2020-01-31 RX ADMIN — LEVALBUTEROL HYDROCHLORIDE 1.25 MG: 1.25 SOLUTION, CONCENTRATE RESPIRATORY (INHALATION) at 07:10

## 2020-01-31 RX ADMIN — IPRATROPIUM BROMIDE 0.5 MG: 0.5 SOLUTION RESPIRATORY (INHALATION) at 07:10

## 2020-01-31 RX ADMIN — METOPROLOL TARTRATE 5 MG: 5 INJECTION, SOLUTION INTRAVENOUS at 13:17

## 2020-01-31 RX ADMIN — HYDROMORPHONE HYDROCHLORIDE 0.2 MG: 1 INJECTION, SOLUTION INTRAMUSCULAR; INTRAVENOUS; SUBCUTANEOUS at 20:24

## 2020-01-31 RX ADMIN — IPRATROPIUM BROMIDE 0.5 MG: 0.5 SOLUTION RESPIRATORY (INHALATION) at 20:09

## 2020-01-31 RX ADMIN — HEPARIN SODIUM 5000 UNITS: 5000 INJECTION INTRAVENOUS; SUBCUTANEOUS at 05:16

## 2020-01-31 RX ADMIN — HYDROMORPHONE HYDROCHLORIDE 0.2 MG: 1 INJECTION, SOLUTION INTRAMUSCULAR; INTRAVENOUS; SUBCUTANEOUS at 08:24

## 2020-01-31 RX ADMIN — METOPROLOL TARTRATE 5 MG: 5 INJECTION, SOLUTION INTRAVENOUS at 05:16

## 2020-01-31 RX ADMIN — HYDROMORPHONE HYDROCHLORIDE 0.2 MG: 1 INJECTION, SOLUTION INTRAMUSCULAR; INTRAVENOUS; SUBCUTANEOUS at 03:33

## 2020-01-31 RX ADMIN — IPRATROPIUM BROMIDE 0.5 MG: 0.5 SOLUTION RESPIRATORY (INHALATION) at 13:07

## 2020-01-31 RX ADMIN — BACITRACIN ZINC, NEOMYCIN SULFATE, AND POLYMYXIN B SULFATE 1 SMALL APPLICATION: 400; 3.5; 5 OINTMENT TOPICAL at 10:23

## 2020-01-31 RX ADMIN — LEVALBUTEROL HYDROCHLORIDE 1.25 MG: 1.25 SOLUTION, CONCENTRATE RESPIRATORY (INHALATION) at 13:07

## 2020-01-31 RX ADMIN — DEXTRAN 70 AND HYPROMELLOSE 2910 1 DROP: 1; 3 SOLUTION/ DROPS OPHTHALMIC at 16:08

## 2020-01-31 RX ADMIN — LEVALBUTEROL HYDROCHLORIDE 1.25 MG: 1.25 SOLUTION, CONCENTRATE RESPIRATORY (INHALATION) at 20:09

## 2020-01-31 RX ADMIN — PANTOPRAZOLE SODIUM 40 MG: 40 INJECTION, POWDER, FOR SOLUTION INTRAVENOUS at 20:23

## 2020-01-31 RX ADMIN — SIMETHICONE CHEW TAB 80 MG 80 MG: 80 TABLET ORAL at 16:08

## 2020-01-31 RX ADMIN — HEPARIN SODIUM 5000 UNITS: 5000 INJECTION INTRAVENOUS; SUBCUTANEOUS at 21:20

## 2020-01-31 RX ADMIN — PANTOPRAZOLE SODIUM 40 MG: 40 INJECTION, POWDER, FOR SOLUTION INTRAVENOUS at 08:26

## 2020-01-31 RX ADMIN — BISACODYL 10 MG: 10 SUPPOSITORY RECTAL at 10:23

## 2020-01-31 RX ADMIN — DEXTRAN 70 AND HYPROMELLOSE 2910 1 DROP: 1; 3 SOLUTION/ DROPS OPHTHALMIC at 20:23

## 2020-01-31 RX ADMIN — METOPROLOL TARTRATE 5 MG: 5 INJECTION, SOLUTION INTRAVENOUS at 19:28

## 2020-01-31 RX ADMIN — HEPARIN SODIUM 5000 UNITS: 5000 INJECTION INTRAVENOUS; SUBCUTANEOUS at 14:00

## 2020-01-31 NOTE — PROGRESS NOTES
[facility]  Progress Note - Tommie Carmona 82 y o  female MRN: [id number]    Unit/Bed#: [facility] 802-01 Encounter: [id number]    Assessment/Plan:  Gastric outlet obstruction  -S/p repeat EGD on 01/30 which revealed persistent large amount of food debris throughout the stomach obscuring views without definitive pylorus identification despite using large channel therapeutic scope, rat tooth forceps, snare and Gregg Hook net to remove debris  In the antrum area there was an opening/tract that may have represented a strictured down pyloric entrance but this was not traversible even using ultra slim scope  A catheter was inserted about 5-6 cm into the sinus tract with at injection of contrast through this area however postprocedure x-ray did not reveal opacification  Biopsies were taken from this area  -findings could represent peptic stricturing disease versus nonvisualized mass  -plan on repeat endoscopic evaluation next week(likely Tuesday) with fluoroscopy and if the tract is visualized/opacified without obstruction can consider stent placement versus dilation  -patient however will likely need surgical intervention to alleviate the obstruction-consider surgical Oncology consultation  -follow-up pathology  -keep NPO  -TPN  -chest x-ray without discernable free air under diaphragm  -care as per primary team    Subjective:   Patient seen examined  Denies nausea vomiting  Mild epigastric discomfort, no flatus  Patient asking if she can be discharged and be admitted for intervention stating she has to take care of things at home  Objective:     Vitals: Blood pressure 101/64, pulse 98, temperature 99 6 °F (37 6 °C), resp  rate 18, height 5' (1 524 m), weight 64 7 kg (142 lb 11 2 oz), SpO2 100 %, not currently breastfeeding  ,Body mass index is 27 87 kg/m²        Intake/Output Summary (Last 24 hours) at 1/31/2020 1233  Last data filed at 1/31/2020 1224  Gross per 24 hour   Intake 1563 33 ml   Output 1650 ml   Net -86 67 ml       Review of Systems: as per HPI  Review of Systems    Physical Exam:     Physical Exam   Constitutional: She is oriented to person, place, and time  No distress  Chronically ill-appearing   HENT:   Head: Atraumatic  Eyes: No scleral icterus  Neck: Neck supple  Cardiovascular: Normal rate  Pulmonary/Chest: Effort normal and breath sounds normal    Abdominal: Soft  She exhibits distension  There is tenderness (mild epigastric tenderness)  Musculoskeletal: She exhibits no edema  Neurological: She is alert and oriented to person, place, and time  Skin: Skin is warm  Psychiatric: She has a normal mood and affect           Invasive Devices     Peripheral Intravenous Line            Peripheral IV 01/31/20 Left;Ventral (anterior) Forearm less than 1 day                        CBC:   Lab Results   Component Value Date    WBC 13 85 (H) 01/31/2020    HGB 10 6 (L) 01/31/2020    HCT 35 9 01/31/2020    MCV 87 01/31/2020     01/31/2020    MCH 25 7 (L) 01/31/2020    MCHC 29 5 (L) 01/31/2020    RDW 16 1 (H) 01/31/2020    MPV 9 5 01/31/2020    NRBC 0 01/31/2020   ,   CMP:   Lab Results   Component Value Date    K 3 6 01/31/2020     01/31/2020    CO2 27 01/31/2020    BUN 2 (L) 01/31/2020    CREATININE 0 60 01/31/2020    CALCIUM 8 6 01/31/2020    AST 17 01/31/2020    ALT 30 01/31/2020    ALKPHOS 94 01/31/2020    EGFR 85 01/31/2020

## 2020-01-31 NOTE — PROGRESS NOTES
Progress Note - Temitope Finch 82 y o  female MRN: [id number]    Unit/Bed#: [facility] 802-01 Encounter: [id number]      Assessment:  82 y o  F who presented with a gastric outlet obstruction secondary to unknown etiology - possible Malignancy vs peptic stricture  EGD on 1/30 c/w w gastric outlet obstruction  Biopsies obtained  Unable to visualize past antrum d/t obstruction gastric outlet  Wbc increased 9-> 13  Hypotensive 80s/50s improved w fluid bolus 100/60s  tmax 100 8   abd soft/ nontender/ non distended  Plan:  Place PICC line  Start TPN  dvt ppx  Ambulate  F/u blood cx  F/u urine cx  F/u cxr    Subjective:   Upset about current situation  Would like to eat  Denied fever, chills, chest pain, shortness of breath, nausea, vomiting, or abdominal pain this morning  Objective:     Vitals: Blood pressure 110/64, pulse 95, temperature 99 2 °F (37 3 °C), resp  rate 22, height 5' (1 524 m), weight 64 7 kg (142 lb 11 2 oz), SpO2 92 %, not currently breastfeeding  ,Body mass index is 27 87 kg/m²  Intake/Output Summary (Last 24 hours) at 1/31/2020 0241  Last data filed at 1/30/2020 2357  Gross per 24 hour   Intake 1563 33 ml   Output 1750 ml   Net -186 67 ml       Physical Exam  General: NAD  HEENT: NC/AT  MMM  Cv: RRR     Lungs: normal effort  Ab: Soft, NT/ND  Ex: no CCE  Neuro: AAOx3    Scheduled Meds:  Current Facility-Administered Medications:  acetaminophen 650 mg Oral Q4H PRN Ko Lanexa, DO    albuterol 2 5 mg Nebulization Q4H PRN Jodie Chaput, DO    bisacodyl 10 mg Rectal Daily Jodie Chaput, DO    dextran 70-hypromellose 1 drop Both Eyes TID Jodie Chaput, DO    dextrose 5 % and sodium chloride 0 45 % with KCl 20 mEq/L 100 mL/hr Intravenous Continuous Jodie Chaput, DO Last Rate: 100 mL/hr (01/30/20 2357)   diltiazem 1-15 mg/hr Intravenous Continuous Jodie Chaput, DO Last Rate: Stopped (01/30/20 2022)   heparin (porcine) 5,000 Units Subcutaneous Q8H [place], DO HYDROmorphone 0 2 mg Intravenous Q3H PRN Jaiden Damico, DO    HYDROmorphone 0 5 mg Intravenous Q3H PRN Janice Villegasut, DO    ipratropium 0 5 mg Nebulization TID Jaiden Drewo, DO    levalbuterol 1 25 mg Nebulization TID Janice Villegasut, DO    LORazepam 0 25 mg Intravenous HS PRN Jaiden Macho, DO    metoprolol 5 mg Intravenous Q6H PRN Jaiden Damico, DO    metoprolol 5 mg Intravenous Q6H Jodie Chaput, DO    neomycin-bacitracin-polymyxin b 1 small application Topical Daily [place] Chaput, DO    ondansetron 4 mg Intravenous Q6H [facility]N [facility]    pantoprazole 40 mg Intravenous Q12H [facility], DO    phenol 1 spray Mouth/Throat Q2H PRN [place] Chaput, DO    saliva substitute 5 spray Mouth/Throat 4x Daily PRN Jaiden Damico, DO      Facility-Administered Medications Ordered in Other Encounters:  metoprolol   PRN Tammi Prader Younger, CRNA     Continuous Infusions:  dextrose 5 % and sodium chloride 0 45 % with KCl 20 mEq/L 100 mL/hr Last Rate: 100 mL/hr (01/30/20 2357)   diltiazem 1-15 mg/hr Last Rate: Stopped (01/30/20 2022)     PRN Meds:   acetaminophen    albuterol    HYDROmorphone    HYDROmorphone    LORazepam    metoprolol    ondansetron    phenol    saliva substitute      Invasive Devices     Peripheral Intravenous Line            Peripheral IV 01/28/20 Dorsal (posterior); Left Forearm 2 days    Peripheral IV 01/29/20 Left Antecubital 1 day                Lab, Imaging and other studies: I have personally reviewed pertinent reports      VTE Pharmacologic Prophylaxis: Heparin  VTE Mechanical Prophylaxis: sequential compression device

## 2020-01-31 NOTE — PROGRESS NOTES
01/31/20 1600   Clinical Encounter Type   Visited With Patient   Lutheran Encounters   Lutheran Needs Prayer  ( blessing)   Sacramental Encounters   Sacrament of Sick-Anointing Anointed

## 2020-01-31 NOTE — CONSULTS
Consultation - Surgical Oncology  Donis Villegas 82 y o  female MRN: [id number]  Unit/Bed#: [facility] 802-01 Encounter: [id number]    Assessment/Plan   Assessment:  82 y o  female who presented with a gastric outlet obstruction  Plan:  Continue NPO   NG tube as needed for nausea or emesis  Agree with continuing TPN at this time to optimize nutrition prior to any intervention  Will follow up on biopsy results  Will discuss treatment options with patient and our team    Staffed with Dr Jhon Patel  History of Present Illness   HPI:  Donis Villegas is a 82 y o  female with history of atrial fibrillation on Eliquis, COPD, diverticulosis, colon surgery, and former smoker who presented 1/21/2020 with gastric outlet obstruction  A CT A/P revealed a stomach distended with fluid and food debris and duodenum/SB normal in caliber  GI was able to perform an EGD 1/30 during which persistent food debris throughout the stomach obscured views making it difficult to definitively identify a pylorus  An opening/tract that may have represented a strictured down pyloric entrance was identified in the antrum, but could not be traversed with the UltraSling scope  A catheter was inserted into the sinus tract but fluoroscopy failed to definitively identify this as the pylorus  Biopsies were taken from this area  GI recommended surgical oncology consultation for consideration of surgical intervention given the severity of the patient's disease  Consults    Review of Systems  10/14 systems reviewed and negative except those mentioned in the HPI      Historical Information   Past Medical History:   Diagnosis Date    Asthma     Atrial fibrillation     Blurred vision     Cardiac disease     Colitis     COPD (chronic obstructive pulmonary disease)     Diverticulosis     DJD (degenerative joint disease)     Emphysema lung     Gait abnormality      Past Surgical History:   Procedure Laterality Date    BLADDER SURGERY  COLON SURGERY      COLONOSCOPY W/ POLYPECTOMY N/A 2019    Procedure: COLONOSCOPY W/ POLYPECTOMY;  Surgeon: Junito Sanchez MD;  Location: [address] [place] [address] GI LAB;   Service: General    SMALL INTESTINE SURGERY       Social History   Social History     Substance and Sexual Activity   Alcohol Use Not Currently    Frequency: Never    Binge frequency: Never     Social History     Substance and Sexual Activity   Drug Use Not Currently     Social History     Tobacco Use   Smoking Status Former Smoker    Last attempt to quit: 2013    Years since quittin 1   Smokeless Tobacco Never Used     Family History: non-contributory    Meds/Allergies   current meds:   Current Facility-Administered Medications   Medication Dose Route Frequency    acetaminophen (TYLENOL) oral suspension 650 mg  650 mg Oral Q4H PRN    Adult 3-in-1 TPN (standard base / standard electrolytes)   Intravenous Continuous    albuterol inhalation solution 2 5 mg  2 5 mg Nebulization Q4H PRN    bisacodyl (DULCOLAX) rectal suppository 10 mg  10 mg Rectal Daily    dextran 70-hypromellose (GENTEAL TEARS) 0 1-0 3 % ophthalmic solution 1 drop  1 drop Both Eyes TID    dextrose 5 % and sodium chloride 0 45 % with KCl 20 mEq/L infusion  100 mL/hr Intravenous Continuous    diltiazem (CARDIZEM) 125 mg in sodium chloride 0 9 % 125 mL infusion  1-15 mg/hr Intravenous Continuous    heparin (porcine) subcutaneous injection 5,000 Units  5,000 Units Subcutaneous Q8H [address]    HYDROmorphone (DILAUDID) injection 0 2 mg  0 2 mg Intravenous Q3H PRN    HYDROmorphone (DILAUDID) injection 0 5 mg  0 5 mg Intravenous Q3H PRN    ipratropium (ATROVENT) 0 02 % inhalation solution 0 5 mg  0 5 mg Nebulization TID    levalbuterol (XOPENEX) inhalation solution 1 25 mg  1 25 mg Nebulization TID    LORazepam (ATIVAN) 2 mg/mL injection 0 25 mg  0 25 mg Intravenous HS PRN    metoprolol (LOPRESSOR) injection 5 mg  5 mg Intravenous Q6H PRN    metoprolol (LOPRESSOR) injection 5 mg  5 mg Intravenous Q6H    neomycin-bacitracin-polymyxin b (NEOSPORIN) ointment 1 small application  1 small application Topical Daily    ondansetron (ZOFRAN) injection 4 mg  4 mg Intravenous Q6H PRN    pantoprazole (PROTONIX) injection 40 mg  40 mg Intravenous Q12H [facility]    phenol (CHLORASEPTIC) 1 4 % mucosal liquid 1 spray  1 spray Mouth/Throat Q2H PRN    saliva substitute (MOUTH KOTE) mucosal solution 5 spray  5 spray Mouth/Throat 4x Daily PRN    simethicone (MYLICON) chewable tablet 80 mg  80 mg Oral Q6H PRN    sodium chloride (OCEAN) 0 65 % nasal spray 1 spray  1 spray Each Nare Q1H PRN     Facility-Administered Medications Ordered in Other Encounters   Medication Dose Route Frequency    metoprolol (LOPRESSOR) injection    PRN    and PTA meds:   Prior to Admission Medications   Prescriptions Last Dose Informant Patient Reported? Taking?    ALPRAZolam (XANAX) 0 5 mg tablet   Yes No   Sig: Take 0 5 mg by mouth daily at bedtime as needed   Calcium Carb-Cholecalciferol (CALCIUM 1000 + D PO)   Yes No   Sig: Take 1,000 mg by mouth daily   albuterol (PROVENTIL HFA,VENTOLIN HFA) 90 mcg/act inhaler   No No   Sig: Inhale 2 puffs every 6 (six) hours as needed for wheezing or shortness of breath   alendronate (FOSAMAX) 70 mg tablet   Yes No   Sig: Take by mouth every 7 days    apixaban (ELIQUIS) 5 mg   No No   Sig: Take 1 tablet (5 mg total) by mouth 2 (two) times a day   cholestyramine sugar free (QUESTRAN LIGHT) 4 g packet   No No   Sig: Take 1 packet (4 g total) by mouth 2 (two) times a day   diltiazem (CARDIZEM CD) 240 mg 24 hr capsule   No No   Sig: Take 1 capsule (240 mg total) by mouth daily   fluticasone-umeclidinium-vilanterol (TRELEGY ELLIPTA) 100-62 5-25 MCG/INH inhaler   Yes No   Si puff daily   ipratropium-albuterol (DUO-NEB) 0 5-2 5 mg/3 mL nebulizer solution   No No   Sig: Take 1 vial (3 mL total) by nebulization every 6 (six) hours while awake   iron polysaccharides (FERREX) 150 mg capsule   No No Sig: Take 1 capsule (150 mg total) by mouth 2 (two) times a day   loperamide (IMODIUM) 2 mg capsule   No No   Sig: Take 1 capsule (2 mg total) by mouth 3 (three) times a day as needed for diarrhea   metoprolol tartrate (LOPRESSOR) 25 mg tablet   No No   Sig: Take 0 5 tablets (12 5 mg total) by mouth every 12 (twelve) hours   montelukast (SINGULAIR) 10 mg tablet   No No   Sig: Take 1 tablet (10 mg total) by mouth daily at bedtime   pantoprazole (PROTONIX) 40 mg tablet   No No   Sig: Take 1 tablet (40 mg total) by mouth daily in the early morning   potassium chloride (MICRO-K) 10 MEQ CR capsule   No No   Sig: Take 1 capsule (10 mEq total) by mouth daily   psyllium (METAMUCIL) packet   No No   Sig: Take 1 packet by mouth 3 (three) times a day      Facility-Administered Medications: None     Allergies   Allergen Reactions    Fluticasone        Objective   First Vitals:   Blood Pressure: 96/64 (01/22/20 1953)  Pulse: (!) 124 (01/22/20 1953)  Temperature: 99 1 °F (37 3 °C) (01/22/20 1953)  Temp Source: Oral (01/22/20 1953)  Respirations: (!) 24 (01/22/20 1953)  Height: 5' (152 4 cm) (01/22/20 1953)  Weight - Scale: 62 4 kg (137 lb 9 1 oz) (01/22/20 1953)  SpO2: 92 % (01/22/20 1953)    Current Vitals:   Blood Pressure: 96/56 (01/31/20 1506)  Pulse: 98 (01/31/20 1506)  Temperature: 99 2 °F (37 3 °C) (01/31/20 1506)  Temp Source: Oral (01/30/20 2354)  Respirations: 16 (01/31/20 1504)  Height: 5' (152 4 cm) (01/22/20 1953)  Weight - Scale: 64 7 kg (142 lb 11 2 oz) (01/30/20 1241)  SpO2: 94 % (01/31/20 1506)      Intake/Output Summary (Last 24 hours) at 1/31/2020 1542  Last data filed at 1/31/2020 1407  Gross per 24 hour   Intake 5256 66 ml   Output 1600 ml   Net 3656 66 ml       Invasive Devices     None                 Physical Exam   Constitutional: She is oriented to person, place, and time  She appears well-developed and well-nourished  No distress  HENT:   Head: Normocephalic and atraumatic     Nose: Nose normal  On nasal cannula   Eyes: Pupils are equal, round, and reactive to light  Conjunctivae and EOM are normal  No scleral icterus  Neck: Normal range of motion  Neck supple  No JVD present  No tracheal deviation present  Cardiovascular: Intact distal pulses  irregularly irregular on monitor   Pulmonary/Chest: Effort normal  No respiratory distress  She exhibits no tenderness  Abdominal: Soft  She exhibits distension  There is no tenderness  There is no rebound and no guarding  Musculoskeletal: Normal range of motion  She exhibits no edema, tenderness or deformity  Neurological: She is alert and oriented to person, place, and time  No cranial nerve deficit  Skin: Skin is warm and dry  She is not diaphoretic  Psychiatric: She has a normal mood and affect  Her behavior is normal  Judgment and thought content normal    Nursing note and vitals reviewed  Lab Results:   CBC:   Lab Results   Component Value Date    WBC 13 85 (H) 01/31/2020    HGB 10 6 (L) 01/31/2020    HCT 35 9 01/31/2020    MCV 87 01/31/2020     01/31/2020    MCH 25 7 (L) 01/31/2020    MCHC 29 5 (L) 01/31/2020    RDW 16 1 (H) 01/31/2020    MPV 9 5 01/31/2020    NRBC 0 01/31/2020   , CMP:   Lab Results   Component Value Date    SODIUM 137 01/31/2020    K 3 6 01/31/2020     01/31/2020    CO2 27 01/31/2020    BUN 2 (L) 01/31/2020    CREATININE 0 60 01/31/2020    CALCIUM 8 6 01/31/2020    AST 17 01/31/2020    ALT 30 01/31/2020    ALKPHOS 94 01/31/2020    EGFR 85 01/31/2020   , Coagulation: No results found for: PT, INR, APTT, Urinalysis: No results found for: Verlie Fothergill, SPECGRAV, PHUR, LEUKOCYTESUR, NITRITE, PROTEINUA, GLUCOSEU, KETONESU, BILIRUBINUR, BLOODU, Amylase: No results found for: AMYLASE, Lipase: No results found for: LIPASE  Imaging:   Procedure: Xr Chest Portable    Result Date: 1/31/2020  Narrative: CHEST INDICATION:   fever and leukocytosis  rule out pna   COMPARISON:  January 22, 2020 EXAM PERFORMED/VIEWS: XR CHEST PORTABLE FINDINGS: Cardiomediastinal silhouette appears unremarkable  Small left and trace right pleural effusion  Mild pulmonary vascular congestion  Osseous structures appear within normal limits for patient age  Impression: Small left and trace right pleural effusion noted with mild pulmonary vascular congestion  Workstation performed: [id number]     Procedure: Xr Abdomen 1 Vw Portable    Result Date: 1/30/2020  Narrative: ABDOMEN INDICATION:   pyloric stricture, contrast injected through tract via EGD scope  COMPARISON:  1/27/2020 VIEWS:  AP supine FINDINGS: There is a nonobstructive bowel gas pattern  No contrast is identified on this exam  No discernible free air on this supine study  Upright or left lateral decubitus imaging is more sensitive to detect subtle free air in the appropriate setting  No pathologic calcifications or soft tissue masses  Visualized lung bases are clear  Visualized osseous structures are unremarkable for the patient's age  Impression: Unremarkable abdomen  Workstation performed: [id number]     1/21 CTA A/P:   1  No evidence of pulmonary embolism  2   Emphysematous changes of lungs without focal consolidation  3   The stomach is distended with fluid and food debris  The duodenum and remainder of the small bowel is normal in caliber  Gastric outlet obstruction is not excluded  The esophagus is distended with fluid, consider aspiration precautions    4   Mild superior endplate depression at Q55 new since prior exam   Moderate superior endplate compression fracture of L4, progressed since prior exam

## 2020-01-31 NOTE — PROGRESS NOTES
Cardiology Progress Note - Ida Stroud 82 y o  female MRN: [id number]    Unit/Bed#: [facility] 802-01 Encounter: [id number]    Assessment and plan  1  Paroxysmal atrial fibrillation with RVR  2  Gastric outlet obstruction  3  Preserved left ventricular ejection fraction  4  COPD    Recommendations:  She converted to sinus rhythm  Heart rate   Continue off IV Cardizem  Blood pressure has been marginal with fever suspect early sepsis  Continue IV Lopressor as blood pressure will tolerate  No anticoagulant until surgical planning is define    Subjective:    No significant events overnight  Denies chest pain or shortness of breath  Telemetry shows conversion to sinus rhythm  ROS    Objective:   Vitals: Blood pressure 107/57, pulse 94, temperature (!) 100 8 °F (38 2 °C), resp  rate 20, height 5' (1 524 m), weight 64 7 kg (142 lb 11 2 oz), SpO2 95 %, not currently breastfeeding , Body mass index is 27 87 kg/m² ,   Orthostatic Blood Pressures      Most Recent Value   Blood Pressure  107/57 filed at 2020 0301   Patient Position - Orthostatic VS  Lying filed at 2020 4044         Systolic (57CQJ), YFE:65 , Min:75 , US     Diastolic (50KGY), ELK:68, Min:37, Max:87      Intake/Output Summary (Last 24 hours) at 2020 0910  Last data filed at 2020 4404  Gross per 24 hour   Intake 1563 33 ml   Output 1675 ml   Net -111 67 ml     Weight (last 2 days)     Date/Time   Weight    20 1241   64 7 (142 7)                Telemetry Review: No significant arrhythmias seen on telemetry review  EKG personally reviewed by Florina Andrade DO  Physical Exam:  Vital signs reviewed  General:  Alert and cooperative, appears stated age, no acute distress  HEENT:  PERRLA, EOMI, no scleral icterus, no conjunctival pallor  Neck:  No lymphadenopathy, no thyromegaly, no carotid bruits, no elevated JVP  Heart:  Regular rate and rhythm, normal S1/S2, no S3/S4, no murmur, rubs or gallops    PMI nondisplaced  Lungs:  Clear to auscultation bilaterally, no wheezes rales or rhonchi  Abdomen:  Soft, non-tender, positive bowel sounds, no rebound or guarding,   no organomegaly   Extremities:  Normal range of motion    No clubbing, cyanosis or edema   Vascular:  2+ pedal pulses  Skin:  No rashes or lesions on exposed skin  Neurologic:  Cranial nerves II-XII grossly intact without focal deficits      Laboratory Results:        CBC with diff:   Results from last 7 days   Lab Units 01/31/20  0438 01/30/20  0449 01/29/20  0634 01/28/20  0525 01/27/20  0606 01/26/20  0554 01/25/20  0439   WBC Thousand/uL 13 85* 9 28 6 84 8 96 9 56 11 68* 16 16*   HEMOGLOBIN g/dL 10 6* 11 6 11 3* 11 7 12 0 12 4 13 0   HEMATOCRIT % 35 9 38 2 37 9 38 0 39 4 40 0 41 7   MCV fL 87 86 87 86 86 85 84   PLATELETS Thousands/uL 298 360 309 317 319 306 272   MCH pg 25 7* 26 1* 25 8* 26 5* 26 2* 26 2* 26 2*   MCHC g/dL 29 5* 30 4* 29 8* 30 8* 30 5* 31 0* 31 2*   RDW % 16 1* 16 0* 15 9* 16 2* 16 1* 15 8* 15 9*   MPV fL 9 5 9 7 9 8 10 4 10 1 10 1 10 5   NRBC AUTO /100 WBCs 0 0 0  --  0 0 0         CMP:  Results from last 7 days   Lab Units 01/31/20  0825 01/31/20 0438 01/30/20  0449 01/29/20  0643 01/28/20  0525 01/27/20  0606 01/26/20  0554   POTASSIUM mmol/L 3 6 4 0 4 4 3 7 4 0 3 6 3 9   CHLORIDE mmol/L 105 106 109* 109* 109* 107 106   CO2 mmol/L 27 28 27 26 25 26 23   BUN mg/dL 2* 2* 1* 1* 2* 4* 6   CREATININE mg/dL 0 60 0 56* 0 53* 0 52* 0 49* 0 49* 0 50*   CALCIUM mg/dL 8 6 8 4 9 0 9 0 9 2 9 0 9 0   AST U/L 17  --   --   --   --   --   --    ALT U/L 30  --   --   --   --   --   --    ALK PHOS U/L 94  --   --   --   --   --   --    EGFR ml/min/1 73sq m 85 87 89 89 91 91 90         BMP:  Results from last 7 days   Lab Units 01/31/20  0825 01/31/20  0438 01/30/20  0449 01/29/20  0643 01/28/20  0525 01/27/20  0606 01/26/20  0554   POTASSIUM mmol/L 3 6 4 0 4 4 3 7 4 0 3 6 3 9   CHLORIDE mmol/L 105 106 109* 109* 109* 107 106   CO2 mmol/L 27 28 27 26 25 26 23   BUN mg/dL 2* 2* 1* 1* 2* 4* 6   CREATININE mg/dL 0 60 0 56* 0 53* 0 52* 0 49* 0 49* 0 50*   CALCIUM mg/dL 8 6 8 4 9 0 9 0 9 2 9 0 9 0       BNP: No results for input(s): BNP in the last 72 hours  Magnesium:   Results from last 7 days   Lab Units 20  0825 20  0449 20  0643   MAGNESIUM mg/dL 1 8 2 2 1 7       Coags:       TSH:        Hemoglobin A1C       Lipid Profile:   Results from last 7 days   Lab Units 20  0825   TRIGLYCERIDES mg/dL 151*       Cardiac testing:   Results for orders placed during the hospital encounter of 10/06/19   Echo complete with contrast if indicated    [place] [address] [employer] [address]    Transthoracic Echocardiogram  2D, M-mode, Doppler, and Color Doppler    Study date:  07-Oct-2019    Patient: Mary Reyes  MR number: [id number]  Account number: [de-identified]  : 1935  Age: 82 years  Gender: Female  Status: Inpatient  [facility]   Height: 60 in  Weight: 164 lb  BP: 88/ 53 mmHg    Indications: Afib    Diagnoses: I48 0 - Atrial fibrillation    Sonographer:  Moises Marie RDCS  Referring Physician:  Zac Larkin MD  Group:  [facility]  Interpreting Physician:  Tia Herrera MD    SUMMARY    LEFT VENTRICLE:  Systolic function was normal  Ejection fraction was estimated to be 60 %  There were no regional wall motion abnormalities  There was mild concentric hypertrophy  Doppler parameters were consistent with abnormal left ventricular relaxation (grade 1 diastolic dysfunction)  MITRAL VALVE:  There was mild annular calcification  There was trace regurgitation  AORTIC VALVE:  There was mild regurgitation  TRICUSPID VALVE:  There was trace regurgitation  HISTORY: PRIOR HISTORY: Congestive heart failure  Atrial fibrillation  Chronic lung disease      PROCEDURE: The study was performed in the [facility]  This was a routine study  The transthoracic approach was used  The study included complete 2D imaging, M-mode, complete spectral Doppler, and color Doppler  The  heart rate was 92 bpm, at the start of the study  Images were obtained from the parasternal, apical, subcostal, and suprasternal notch acoustic windows  Echocardiographic views were limited due to poor acoustic window availability,  decreased penetration, and lung interference  This was a technically difficult study  LEFT VENTRICLE: Size was normal  Systolic function was normal  Ejection fraction was estimated to be 60 %  There were no regional wall motion abnormalities  Wall thickness was mildly increased  There was mild concentric hypertrophy  DOPPLER: Doppler parameters were consistent with abnormal left ventricular relaxation (grade 1 diastolic dysfunction)  RIGHT VENTRICLE: The size was normal  Systolic function was normal  Wall thickness was normal     LEFT ATRIUM: Size was normal     RIGHT ATRIUM: Size was normal     MITRAL VALVE: There was mild annular calcification  Valve structure was normal  There was normal leaflet separation  DOPPLER: The transmitral velocity was within the normal range  There was no evidence for stenosis  There was trace  regurgitation  AORTIC VALVE: The valve was not well visualized  DOPPLER: Transaortic velocity was within the normal range  There was no evidence for stenosis  There was mild regurgitation  TRICUSPID VALVE: The valve structure was normal  There was normal leaflet separation  DOPPLER: The transtricuspid velocity was within the normal range  There was no evidence for stenosis  There was trace regurgitation  Pulmonary artery  systolic pressure was moderately increased  Estimated peak PA pressure was 65 mmHg  The findings suggest moderate pulmonary hypertension  PULMONIC VALVE: Not well visualized  DOPPLER: The transpulmonic velocity was within the normal range   There was no significant regurgitation  PERICARDIUM: There was no pericardial effusion  The pericardium was normal in appearance  AORTA: The root exhibited normal size  SYSTEMIC VEINS: IVC: The inferior vena cava was upper normal     SYSTEM MEASUREMENT TABLES    2D  %FS: 30 38 %  AV Diam: 3 11 cm  EDV(Teich): 61 91 ml  EF(Teich): 58 53 %  ESV(Teich): 25 68 ml  IVSd: 1 15 cm  LA Area: 16 7 cm2  LA Diam: 3 58 cm  LVEDV MOD A4C: 81 9 ml  LVEF MOD A4C: 56 03 %  LVESV MOD A4C: 36 01 ml  LVIDd: 3 8 cm  LVIDs: 2 65 cm  LVLd A4C: 6 83 cm  LVLs A4C: 5 86 cm  LVPWd: 1 12 cm  RA Area: 14 56 cm2  RV Diam : 3 27 cm  SV MOD A4C: 45 89 ml  SV(Cube): 36 32 ml  SV(Teich): 36 24 ml    CW  AR Dec Trigg: 1 94 m/s2  AR Dec Time: 1840 69 ms  AR PHT: 533 8 ms  AR Vmax: 3 58 m/s  AR maxP 22 mmHg  TR Vmax: 3 49 m/s  TR maxP 16 mmHg    MM  TAPSE: 2 16 cm    PW  E': 0 07 m/s  E/E': 13 57  MV A Rodrigue: 1 44 m/s  MV Dec Trigg: 2 9 m/s2  MV DecT: 337 71 ms  MV E Rodrigue: 0 98 m/s  MV E/A Ratio: 0 68    [facility] Accredited Echocardiography Laboratory    Prepared and electronically signed by    Linwood Suarez MD  Signed 07-Oct-2019 21:51:59       No results found for this or any previous visit  No results found for this or any previous visit  No results found for this or any previous visit      Meds/Allergies   all current active meds have been reviewed  Medications Prior to Admission   Medication    albuterol (PROVENTIL HFA,VENTOLIN HFA) 90 mcg/act inhaler    alendronate (FOSAMAX) 70 mg tablet    ALPRAZolam (XANAX) 0 5 mg tablet    apixaban (ELIQUIS) 5 mg    Calcium Carb-Cholecalciferol (CALCIUM 1000 + D PO)    cholestyramine sugar free (QUESTRAN LIGHT) 4 g packet    diltiazem (CARDIZEM CD) 240 mg 24 hr capsule    fluticasone-umeclidinium-vilanterol (TRELEGY ELLIPTA) 100-62 5-25 MCG/INH inhaler    ipratropium-albuterol (DUO-NEB) 0 5-2 5 mg/3 mL nebulizer solution    iron polysaccharides (FERREX) 150 mg capsule    loperamide (IMODIUM) 2 mg capsule    metoprolol tartrate (LOPRESSOR) 25 mg tablet    montelukast (SINGULAIR) 10 mg tablet    pantoprazole (PROTONIX) 40 mg tablet    potassium chloride (MICRO-K) 10 MEQ CR capsule    psyllium (METAMUCIL) packet         Adult TPN (STANDARD BASE/STANDARD ELECTROLYTE)     dextrose 5 % and sodium chloride 0 45 % with KCl 20 mEq/L 100 mL/hr Last Rate: 100 mL/hr (01/30/20 2357)   diltiazem 1-15 mg/hr Last Rate: Stopped (01/30/20 2022)     Assessment:  Principal Problem:    Gastric outlet obstruction  Active Problems:    COPD (chronic obstructive pulmonary disease) (HCC)    Colorectal polyps    Chronic low back pain    Atrial fibrillation with rapid ventricular response (HCC)    Compression fracture of L4 vertebra (HCC)    Closed wedge compression fracture of T12 vertebra (HCC)

## 2020-01-31 NOTE — PLAN OF CARE
Problem: Prexisting or High Potential for Compromised Skin Integrity  Goal: Skin integrity is maintained or improved  Description  INTERVENTIONS:  - Identify patients at risk for skin breakdown  - Assess and monitor skin integrity  - Assess and monitor nutrition and hydration status  - Monitor labs   - Assess for incontinence   - Turn and reposition patient  - Assist with mobility/ambulation  - Relieve pressure over bony prominences  - Avoid friction and shearing  - Provide appropriate hygiene as needed including keeping skin clean and dry  - Evaluate need for skin moisturizer/barrier cream  - Collaborate with interdisciplinary team   - Patient/family teaching  - Consider wound care consult   Outcome: Progressing     Problem: Potential for Falls  Goal: Patient will remain free of falls  Description  INTERVENTIONS:  - Assess patient frequently for physical needs  -  Identify cognitive and physical deficits and behaviors that affect risk of falls    -  [place] fall precautions as indicated by assessment   - Educate patient/family on patient safety including physical limitations  - Instruct patient to call for assistance with activity based on assessment  - Modify environment to reduce risk of injury  - Consider OT/PT consult to assist with strengthening/mobility  Outcome: Progressing     Problem: PAIN - ADULT  Goal: Verbalizes/displays adequate comfort level or baseline comfort level  Description  Interventions:  - Encourage patient to monitor pain and request assistance  - Assess pain using appropriate pain scale  - Administer analgesics based on type and severity of pain and evaluate response  - Implement non-pharmacological measures as appropriate and evaluate response  - Consider cultural and social influences on pain and pain management  - Notify physician/advanced practitioner if interventions unsuccessful or patient reports new pain  Outcome: Progressing     Problem: INFECTION - ADULT  Goal: Absence or prevention of progression during hospitalization  Description  INTERVENTIONS:  - Assess and monitor for signs and symptoms of infection  - Monitor lab/diagnostic results  - Monitor all insertion sites, i e  indwelling lines, tubes, and drains  - Monitor endotracheal if appropriate and nasal secretions for changes in amount and color  - [place] appropriate cooling/warming therapies per order  - Administer medications as ordered  - Instruct and encourage patient and family to use good hand hygiene technique  - Identify and instruct in appropriate isolation precautions for identified infection/condition  Outcome: Progressing  Goal: Absence of fever/infection during neutropenic period  Description  INTERVENTIONS:  - Monitor WBC    Outcome: Progressing     Problem: SAFETY ADULT  Goal: Patient will remain free of falls  Description  INTERVENTIONS:  - Assess patient frequently for physical needs  -  Identify cognitive and physical deficits and behaviors that affect risk of falls    -  [place] fall precautions as indicated by assessment   - Educate patient/family on patient safety including physical limitations  - Instruct patient to call for assistance with activity based on assessment  - Modify environment to reduce risk of injury  - Consider OT/PT consult to assist with strengthening/mobility  Outcome: Progressing  Goal: Maintain or return to baseline ADL function  Description  INTERVENTIONS:  -  Assess patient's ability to carry out ADLs; assess patient's baseline for ADL function and identify physical deficits which impact ability to perform ADLs (bathing, care of mouth/teeth, toileting, grooming, dressing, etc )  - Assess/evaluate cause of self-care deficits   - Assess range of motion  - Assess patient's mobility; develop plan if impaired  - Assess patient's need for assistive devices and provide as appropriate  - Encourage maximum independence but intervene and supervise when necessary  - Involve family in performance of ADLs  - Assess for home care needs following discharge   - Consider OT consult to assist with ADL evaluation and planning for discharge  - Provide patient education as appropriate  Outcome: Progressing  Goal: Maintain or return mobility status to optimal level  Description  INTERVENTIONS:  - Assess patient's baseline mobility status (ambulation, transfers, stairs, etc )    - Identify cognitive and physical deficits and behaviors that affect mobility  - Identify mobility aids required to assist with transfers and/or ambulation (gait belt, sit-to-stand, lift, walker, cane, etc )  - [place] fall precautions as indicated by assessment  - Record patient progress and toleration of activity level on Mobility SBAR; progress patient to next Phase/Stage  - Instruct patient to call for assistance with activity based on assessment  - Consider rehabilitation consult to assist with strengthening/weightbearing, etc   Outcome: Progressing     Problem: DISCHARGE PLANNING  Goal: Discharge to home or other facility with appropriate resources  Description  INTERVENTIONS:  - Identify barriers to discharge w/patient and caregiver  - Arrange for needed discharge resources and transportation as appropriate  - Identify discharge learning needs (meds, wound care, etc )  - Arrange for interpretive services to assist at discharge as needed  - Refer to Case Management Department for coordinating discharge planning if the patient needs post-hospital services based on physician/advanced practitioner order or complex needs related to functional status, cognitive ability, or social support system  Outcome: Progressing     Problem: Knowledge Deficit  Goal: Patient/family/caregiver demonstrates understanding of disease process, treatment plan, medications, and discharge instructions  Description  Complete learning assessment and assess knowledge base    Interventions:  - Provide teaching at level of understanding  - Provide teaching via preferred learning methods  Outcome: Progressing     Problem: RESPIRATORY - ADULT  Goal: Achieves optimal ventilation and oxygenation  Description  INTERVENTIONS:  - Assess for changes in respiratory status  - Assess for changes in mentation and behavior  - Position to facilitate oxygenation and minimize respiratory effort  - Oxygen administered by appropriate delivery if ordered  - Initiate smoking cessation education as indicated  - Encourage broncho-pulmonary hygiene including cough, deep breathe, Incentive Spirometry  - Assess the need for suctioning and aspirate as needed  - Assess and instruct to report SOB or any respiratory difficulty  - Respiratory Therapy support as indicated  Outcome: Progressing     Problem: GASTROINTESTINAL - ADULT  Goal: Minimal or absence of nausea and/or vomiting  Description  INTERVENTIONS:  - Administer IV fluids if ordered to ensure adequate hydration  - Maintain NPO status until nausea and vomiting are resolved  - Nasogastric tube if ordered  - Administer ordered antiemetic medications as needed  - Provide nonpharmacologic comfort measures as appropriate  - Advance diet as tolerated, if ordered  - Consider nutrition services referral to assist patient with adequate nutrition and appropriate food choices  Outcome: Progressing  Goal: Maintains or returns to baseline bowel function  Description  INTERVENTIONS:  - Assess bowel function  - Encourage oral fluids to ensure adequate hydration  - Administer IV fluids if ordered to ensure adequate hydration  - Administer ordered medications as needed  - Encourage mobilization and activity  - Consider nutritional services referral to assist patient with adequate nutrition and appropriate food choices  Outcome: Progressing  Goal: Maintains adequate nutritional intake  Description  INTERVENTIONS:  - Monitor percentage of each meal consumed  - Identify factors contributing to decreased intake, treat as appropriate  - Assist with meals as needed  - Monitor I&O, weight, and lab values if indicated  - Obtain nutrition services referral as needed  Outcome: Progressing     Problem: METABOLIC, FLUID AND ELECTROLYTES - ADULT  Goal: Electrolytes maintained within normal limits  Description  INTERVENTIONS:  - Monitor labs and assess patient for signs and symptoms of electrolyte imbalances  - Administer electrolyte replacement as ordered  - Monitor response to electrolyte replacements, including repeat lab results as appropriate  - Instruct patient on fluid and nutrition as appropriate  Outcome: Progressing  Goal: Fluid balance maintained  Description  INTERVENTIONS:  - Monitor labs   - Monitor I/O and WT  - Instruct patient on fluid and nutrition as appropriate  - Assess for signs & symptoms of volume excess or deficit  Outcome: Progressing     Problem: MUSCULOSKELETAL - ADULT  Goal: Maintain or return mobility to safest level of function  Description  INTERVENTIONS:  - Assess patient's ability to carry out ADLs; assess patient's baseline for ADL function and identify physical deficits which impact ability to perform ADLs (bathing, care of mouth/teeth, toileting, grooming, dressing, etc )  - Assess/evaluate cause of self-care deficits   - Assess range of motion  - Assess patient's mobility  - Assess patient's need for assistive devices and provide as appropriate  - Encourage maximum independence but intervene and supervise when necessary  - Involve family in performance of ADLs  - Assess for home care needs following discharge   - Consider OT consult to assist with ADL evaluation and planning for discharge  - Provide patient education as appropriate  Outcome: Progressing  Goal: Maintain proper alignment of affected body part  Description  INTERVENTIONS:  - Support, maintain and protect limb and body alignment  - Provide patient/ family with appropriate education  Outcome: Progressing     Problem: Nutrition/Hydration-ADULT  Goal: Nutrient/Hydration intake appropriate for improving, restoring or maintaining nutritional needs  Description  Monitor and assess patient's nutrition/hydration status for malnutrition  Collaborate with interdisciplinary team and initiate plan and interventions as ordered  Monitor patient's weight and dietary intake as ordered or per policy  Utilize nutrition screening tool and intervene as necessary  Determine patient's food preferences and provide high-protein, high-caloric foods as appropriate       INTERVENTIONS:  - Monitor oral intake, urinary output, labs, and treatment plans  - Assess nutrition and hydration status and recommend course of action  - Evaluate amount of meals eaten  - Assist patient with eating if necessary   - Allow adequate time for meals  - Recommend/ encourage appropriate diets, oral nutritional supplements, and vitamin/mineral supplements  - Order, calculate, and assess calorie counts as needed  - Recommend, monitor, and adjust tube feedings and TPN/PPN based on assessed needs  - Assess need for intravenous fluids  - Provide specific nutrition/hydration education as appropriate  - Include patient/family/caregiver in decisions related to nutrition  Outcome: Progressing     Problem: COPING  Goal: Pt/Family able to verbalize concerns and demonstrate effective coping strategies  Description  INTERVENTIONS:  - Assist patient/family to identify coping skills, available support systems and cultural and spiritual values  - Provide emotional support, including active listening and acknowledgement of concerns of patient and caregivers  - Reduce environmental stimuli, as able  - Provide patient education  - Assess for spiritual pain/suffering and initiate spiritual care, including notification of Pastoral Care or [place] based community as needed  - Assess effectiveness of coping strategies  Outcome: Progressing  Goal: Will report anxiety at manageable levels  Description  INTERVENTIONS:  - Administer medication as ordered  - Teach and encourage coping skills  - Provide emotional support  - Assess patient/family for anxiety and ability to cope  Outcome: Progressing     Problem: CARDIOVASCULAR - ADULT  Goal: Maintains optimal cardiac output and hemodynamic stability  Description  INTERVENTIONS:  - Monitor I/O, vital signs and rhythm  - Monitor for S/S and trends of decreased cardiac output  - Administer and titrate ordered vasoactive medications to optimize hemodynamic stability  - Assess quality of pulses, skin color and temperature  - Assess for signs of decreased coronary artery perfusion  - Instruct patient to report change in severity of symptoms  Outcome: Progressing  Goal: Absence of cardiac dysrhythmias or at baseline rhythm  Description  INTERVENTIONS:  - Continuous cardiac monitoring, vital signs, obtain 12 lead EKG if ordered  - Administer antiarrhythmic and heart rate control medications as ordered  - Monitor electrolytes and administer replacement therapy as ordered  Outcome: Progressing

## 2020-01-31 NOTE — PROGRESS NOTES
Received report on pt, previous shift stopped cardizem drip due to hypotension at 2022 and pts HR was well controlled per off going RN  Pt was given a NS bolus which brought her BP up to 108/62  Rosa Allred with [facility] team to clarify if drip needed to be restarted, per his recommendations will monitor pt's HR and BP and call him to restart if HR is sustained above 110 for extended period of time  Pt's HR is currently in 90's and she is asleep

## 2020-01-31 NOTE — PROGRESS NOTES
Surgery  Progress  1/30/2020    Pt hypotensive earlier in night 80s/30s  Went to evaluate pt  Pt was calm and asymptomatic on exam  1L bolus given  Responded well f/u BP: 108/62  HR well controlled in 90s  Continuing to follow  Please contact with questions       Azael Calvo MD  1/30/2020  11:57PM

## 2020-02-01 LAB
ANION GAP SERPL CALCULATED.3IONS-SCNC: 3 MMOL/L (ref 4–13)
BASOPHILS # BLD AUTO: 0.02 THOUSANDS/ΜL (ref 0–0.1)
BASOPHILS NFR BLD AUTO: 0 % (ref 0–1)
BUN SERPL-MCNC: 3 MG/DL (ref 5–25)
CALCIUM SERPL-MCNC: 8.7 MG/DL (ref 8.3–10.1)
CANCER AG19-9 SERPL-ACNC: 2 U/ML (ref 0–35)
CHLORIDE SERPL-SCNC: 108 MMOL/L (ref 100–108)
CO2 SERPL-SCNC: 28 MMOL/L (ref 21–32)
CREAT SERPL-MCNC: 0.44 MG/DL (ref 0.6–1.3)
EOSINOPHIL # BLD AUTO: 0.09 THOUSAND/ΜL (ref 0–0.61)
EOSINOPHIL NFR BLD AUTO: 1 % (ref 0–6)
ERYTHROCYTE [DISTWIDTH] IN BLOOD BY AUTOMATED COUNT: 16.2 % (ref 11.6–15.1)
GFR SERPL CREATININE-BSD FRML MDRD: 94 ML/MIN/1.73SQ M
GLUCOSE SERPL-MCNC: 121 MG/DL (ref 65–140)
HCT VFR BLD AUTO: 34.9 % (ref 34.8–46.1)
HGB BLD-MCNC: 10.4 G/DL (ref 11.5–15.4)
IMM GRANULOCYTES # BLD AUTO: 0.09 THOUSAND/UL (ref 0–0.2)
IMM GRANULOCYTES NFR BLD AUTO: 1 % (ref 0–2)
LYMPHOCYTES # BLD AUTO: 1.18 THOUSANDS/ΜL (ref 0.6–4.47)
LYMPHOCYTES NFR BLD AUTO: 11 % (ref 14–44)
MCH RBC QN AUTO: 25.6 PG (ref 26.8–34.3)
MCHC RBC AUTO-ENTMCNC: 29.8 G/DL (ref 31.4–37.4)
MCV RBC AUTO: 86 FL (ref 82–98)
MONOCYTES # BLD AUTO: 0.59 THOUSAND/ΜL (ref 0.17–1.22)
MONOCYTES NFR BLD AUTO: 5 % (ref 4–12)
NEUTROPHILS # BLD AUTO: 8.89 THOUSANDS/ΜL (ref 1.85–7.62)
NEUTS SEG NFR BLD AUTO: 82 % (ref 43–75)
NRBC BLD AUTO-RTO: 0 /100 WBCS
PLATELET # BLD AUTO: 252 THOUSANDS/UL (ref 149–390)
PMV BLD AUTO: 9 FL (ref 8.9–12.7)
POTASSIUM SERPL-SCNC: 3.8 MMOL/L (ref 3.5–5.3)
RBC # BLD AUTO: 4.06 MILLION/UL (ref 3.81–5.12)
SODIUM SERPL-SCNC: 139 MMOL/L (ref 136–145)
WBC # BLD AUTO: 10.86 THOUSAND/UL (ref 4.31–10.16)

## 2020-02-01 PROCEDURE — 05HY33Z INSERTION OF INFUSION DEVICE INTO UPPER VEIN, PERCUTANEOUS APPROACH: ICD-10-PCS | Performed by: SURGERY

## 2020-02-01 PROCEDURE — 99232 SBSQ HOSP IP/OBS MODERATE 35: CPT | Performed by: INTERNAL MEDICINE

## 2020-02-01 PROCEDURE — 85025 COMPLETE CBC W/AUTO DIFF WBC: CPT | Performed by: STUDENT IN AN ORGANIZED HEALTH CARE EDUCATION/TRAINING PROGRAM

## 2020-02-01 PROCEDURE — 94760 N-INVAS EAR/PLS OXIMETRY 1: CPT

## 2020-02-01 PROCEDURE — 36569 INSJ PICC 5 YR+ W/O IMAGING: CPT

## 2020-02-01 PROCEDURE — C1751 CATH, INF, PER/CENT/MIDLINE: HCPCS

## 2020-02-01 PROCEDURE — 80048 BASIC METABOLIC PNL TOTAL CA: CPT | Performed by: STUDENT IN AN ORGANIZED HEALTH CARE EDUCATION/TRAINING PROGRAM

## 2020-02-01 PROCEDURE — 99232 SBSQ HOSP IP/OBS MODERATE 35: CPT | Performed by: SURGERY

## 2020-02-01 PROCEDURE — 94640 AIRWAY INHALATION TREATMENT: CPT

## 2020-02-01 PROCEDURE — C9113 INJ PANTOPRAZOLE SODIUM, VIA: HCPCS | Performed by: INTERNAL MEDICINE

## 2020-02-01 RX ADMIN — CALCIUM GLUCONATE: 98 INJECTION, SOLUTION INTRAVENOUS at 21:09

## 2020-02-01 RX ADMIN — DEXTRAN 70 AND HYPROMELLOSE 2910 1 DROP: 1; 3 SOLUTION/ DROPS OPHTHALMIC at 15:05

## 2020-02-01 RX ADMIN — IPRATROPIUM BROMIDE 0.5 MG: 0.5 SOLUTION RESPIRATORY (INHALATION) at 13:29

## 2020-02-01 RX ADMIN — HEPARIN SODIUM 5000 UNITS: 5000 INJECTION INTRAVENOUS; SUBCUTANEOUS at 21:08

## 2020-02-01 RX ADMIN — HYDROMORPHONE HYDROCHLORIDE 0.2 MG: 1 INJECTION, SOLUTION INTRAMUSCULAR; INTRAVENOUS; SUBCUTANEOUS at 15:05

## 2020-02-01 RX ADMIN — METOPROLOL TARTRATE 5 MG: 5 INJECTION, SOLUTION INTRAVENOUS at 17:08

## 2020-02-01 RX ADMIN — METOPROLOL TARTRATE 5 MG: 5 INJECTION, SOLUTION INTRAVENOUS at 11:25

## 2020-02-01 RX ADMIN — DEXTROSE, SODIUM CHLORIDE, AND POTASSIUM CHLORIDE 100 ML/HR: 5; .45; .15 INJECTION INTRAVENOUS at 00:02

## 2020-02-01 RX ADMIN — LEVALBUTEROL HYDROCHLORIDE 1.25 MG: 1.25 SOLUTION, CONCENTRATE RESPIRATORY (INHALATION) at 13:29

## 2020-02-01 RX ADMIN — LEVALBUTEROL HYDROCHLORIDE 1.25 MG: 1.25 SOLUTION, CONCENTRATE RESPIRATORY (INHALATION) at 08:35

## 2020-02-01 RX ADMIN — DEXTROSE, SODIUM CHLORIDE, AND POTASSIUM CHLORIDE 100 ML/HR: 5; .45; .15 INJECTION INTRAVENOUS at 10:22

## 2020-02-01 RX ADMIN — IPRATROPIUM BROMIDE 0.5 MG: 0.5 SOLUTION RESPIRATORY (INHALATION) at 08:35

## 2020-02-01 RX ADMIN — BACITRACIN ZINC, NEOMYCIN SULFATE, AND POLYMYXIN B SULFATE 1 SMALL APPLICATION: 400; 3.5; 5 OINTMENT TOPICAL at 09:01

## 2020-02-01 RX ADMIN — METOPROLOL TARTRATE 5 MG: 5 INJECTION, SOLUTION INTRAVENOUS at 00:02

## 2020-02-01 RX ADMIN — DEXTRAN 70 AND HYPROMELLOSE 2910 1 DROP: 1; 3 SOLUTION/ DROPS OPHTHALMIC at 09:04

## 2020-02-01 RX ADMIN — HEPARIN SODIUM 5000 UNITS: 5000 INJECTION INTRAVENOUS; SUBCUTANEOUS at 15:04

## 2020-02-01 RX ADMIN — METOPROLOL TARTRATE 5 MG: 5 INJECTION, SOLUTION INTRAVENOUS at 23:06

## 2020-02-01 RX ADMIN — DEXTRAN 70 AND HYPROMELLOSE 2910 1 DROP: 1; 3 SOLUTION/ DROPS OPHTHALMIC at 20:13

## 2020-02-01 RX ADMIN — PANTOPRAZOLE SODIUM 40 MG: 40 INJECTION, POWDER, FOR SOLUTION INTRAVENOUS at 20:44

## 2020-02-01 RX ADMIN — HYDROMORPHONE HYDROCHLORIDE 0.2 MG: 1 INJECTION, SOLUTION INTRAMUSCULAR; INTRAVENOUS; SUBCUTANEOUS at 06:08

## 2020-02-01 RX ADMIN — PANTOPRAZOLE SODIUM 40 MG: 40 INJECTION, POWDER, FOR SOLUTION INTRAVENOUS at 09:00

## 2020-02-01 RX ADMIN — LEVALBUTEROL HYDROCHLORIDE 1.25 MG: 1.25 SOLUTION, CONCENTRATE RESPIRATORY (INHALATION) at 19:47

## 2020-02-01 RX ADMIN — HYDROMORPHONE HYDROCHLORIDE 0.2 MG: 1 INJECTION, SOLUTION INTRAMUSCULAR; INTRAVENOUS; SUBCUTANEOUS at 09:01

## 2020-02-01 RX ADMIN — BISACODYL 10 MG: 10 SUPPOSITORY RECTAL at 09:01

## 2020-02-01 RX ADMIN — HYDROMORPHONE HYDROCHLORIDE 0.2 MG: 1 INJECTION, SOLUTION INTRAMUSCULAR; INTRAVENOUS; SUBCUTANEOUS at 23:06

## 2020-02-01 RX ADMIN — HEPARIN SODIUM 5000 UNITS: 5000 INJECTION INTRAVENOUS; SUBCUTANEOUS at 06:34

## 2020-02-01 RX ADMIN — IPRATROPIUM BROMIDE 0.5 MG: 0.5 SOLUTION RESPIRATORY (INHALATION) at 19:47

## 2020-02-01 NOTE — PLAN OF CARE
Problem: Prexisting or High Potential for Compromised Skin Integrity  Goal: Skin integrity is maintained or improved  Description  INTERVENTIONS:  - Identify patients at risk for skin breakdown  - Assess and monitor skin integrity  - Assess and monitor nutrition and hydration status  - Monitor labs   - Assess for incontinence   - Turn and reposition patient  - Assist with mobility/ambulation  - Relieve pressure over bony prominences  - Avoid friction and shearing  - Provide appropriate hygiene as needed including keeping skin clean and dry  - Evaluate need for skin moisturizer/barrier cream  - Collaborate with interdisciplinary team   - Patient/family teaching  - Consider wound care consult   Outcome: Progressing     Problem: Potential for Falls  Goal: Patient will remain free of falls  Description  INTERVENTIONS:  - Assess patient frequently for physical needs  -  Identify cognitive and physical deficits and behaviors that affect risk of falls    -  [place] fall precautions as indicated by assessment   - Educate patient/family on patient safety including physical limitations  - Instruct patient to call for assistance with activity based on assessment  - Modify environment to reduce risk of injury  - Consider OT/PT consult to assist with strengthening/mobility  Outcome: Progressing     Problem: PAIN - ADULT  Goal: Verbalizes/displays adequate comfort level or baseline comfort level  Description  Interventions:  - Encourage patient to monitor pain and request assistance  - Assess pain using appropriate pain scale  - Administer analgesics based on type and severity of pain and evaluate response  - Implement non-pharmacological measures as appropriate and evaluate response  - Consider cultural and social influences on pain and pain management  - Notify physician/advanced practitioner if interventions unsuccessful or patient reports new pain  Outcome: Progressing     Problem: INFECTION - ADULT  Goal: Absence or prevention of progression during hospitalization  Description  INTERVENTIONS:  - Assess and monitor for signs and symptoms of infection  - Monitor lab/diagnostic results  - Monitor all insertion sites, i e  indwelling lines, tubes, and drains  - Monitor endotracheal if appropriate and nasal secretions for changes in amount and color  - [place] appropriate cooling/warming therapies per order  - Administer medications as ordered  - Instruct and encourage patient and family to use good hand hygiene technique  - Identify and instruct in appropriate isolation precautions for identified infection/condition  Outcome: Progressing  Goal: Absence of fever/infection during neutropenic period  Description  INTERVENTIONS:  - Monitor WBC    Outcome: Progressing     Problem: SAFETY ADULT  Goal: Patient will remain free of falls  Description  INTERVENTIONS:  - Assess patient frequently for physical needs  -  Identify cognitive and physical deficits and behaviors that affect risk of falls    -  [place] fall precautions as indicated by assessment   - Educate patient/family on patient safety including physical limitations  - Instruct patient to call for assistance with activity based on assessment  - Modify environment to reduce risk of injury  - Consider OT/PT consult to assist with strengthening/mobility  Outcome: Progressing  Goal: Maintain or return to baseline ADL function  Description  INTERVENTIONS:  -  Assess patient's ability to carry out ADLs; assess patient's baseline for ADL function and identify physical deficits which impact ability to perform ADLs (bathing, care of mouth/teeth, toileting, grooming, dressing, etc )  - Assess/evaluate cause of self-care deficits   - Assess range of motion  - Assess patient's mobility; develop plan if impaired  - Assess patient's need for assistive devices and provide as appropriate  - Encourage maximum independence but intervene and supervise when necessary  - Involve family in performance of ADLs  - Assess for home care needs following discharge   - Consider OT consult to assist with ADL evaluation and planning for discharge  - Provide patient education as appropriate  Outcome: Progressing  Goal: Maintain or return mobility status to optimal level  Description  INTERVENTIONS:  - Assess patient's baseline mobility status (ambulation, transfers, stairs, etc )    - Identify cognitive and physical deficits and behaviors that affect mobility  - Identify mobility aids required to assist with transfers and/or ambulation (gait belt, sit-to-stand, lift, walker, cane, etc )  - [place] fall precautions as indicated by assessment  - Record patient progress and toleration of activity level on Mobility SBAR; progress patient to next Phase/Stage  - Instruct patient to call for assistance with activity based on assessment  - Consider rehabilitation consult to assist with strengthening/weightbearing, etc   Outcome: Progressing     Problem: DISCHARGE PLANNING  Goal: Discharge to home or other facility with appropriate resources  Description  INTERVENTIONS:  - Identify barriers to discharge w/patient and caregiver  - Arrange for needed discharge resources and transportation as appropriate  - Identify discharge learning needs (meds, wound care, etc )  - Arrange for interpretive services to assist at discharge as needed  - Refer to Case Management Department for coordinating discharge planning if the patient needs post-hospital services based on physician/advanced practitioner order or complex needs related to functional status, cognitive ability, or social support system  Outcome: Progressing     Problem: Knowledge Deficit  Goal: Patient/family/caregiver demonstrates understanding of disease process, treatment plan, medications, and discharge instructions  Description  Complete learning assessment and assess knowledge base    Interventions:  - Provide teaching at level of understanding  - Provide teaching via preferred learning methods  Outcome: Progressing     Problem: RESPIRATORY - ADULT  Goal: Achieves optimal ventilation and oxygenation  Description  INTERVENTIONS:  - Assess for changes in respiratory status  - Assess for changes in mentation and behavior  - Position to facilitate oxygenation and minimize respiratory effort  - Oxygen administered by appropriate delivery if ordered  - Initiate smoking cessation education as indicated  - Encourage broncho-pulmonary hygiene including cough, deep breathe, Incentive Spirometry  - Assess the need for suctioning and aspirate as needed  - Assess and instruct to report SOB or any respiratory difficulty  - Respiratory Therapy support as indicated  Outcome: Progressing     Problem: GASTROINTESTINAL - ADULT  Goal: Minimal or absence of nausea and/or vomiting  Description  INTERVENTIONS:  - Administer IV fluids if ordered to ensure adequate hydration  - Maintain NPO status until nausea and vomiting are resolved  - Nasogastric tube if ordered  - Administer ordered antiemetic medications as needed  - Provide nonpharmacologic comfort measures as appropriate  - Advance diet as tolerated, if ordered  - Consider nutrition services referral to assist patient with adequate nutrition and appropriate food choices  Outcome: Progressing  Goal: Maintains or returns to baseline bowel function  Description  INTERVENTIONS:  - Assess bowel function  - Encourage oral fluids to ensure adequate hydration  - Administer IV fluids if ordered to ensure adequate hydration  - Administer ordered medications as needed  - Encourage mobilization and activity  - Consider nutritional services referral to assist patient with adequate nutrition and appropriate food choices  Outcome: Progressing  Goal: Maintains adequate nutritional intake  Description  INTERVENTIONS:  - Monitor percentage of each meal consumed  - Identify factors contributing to decreased intake, treat as appropriate  - Assist with meals as needed  - Monitor I&O, weight, and lab values if indicated  - Obtain nutrition services referral as needed  Outcome: Progressing     Problem: METABOLIC, FLUID AND ELECTROLYTES - ADULT  Goal: Electrolytes maintained within normal limits  Description  INTERVENTIONS:  - Monitor labs and assess patient for signs and symptoms of electrolyte imbalances  - Administer electrolyte replacement as ordered  - Monitor response to electrolyte replacements, including repeat lab results as appropriate  - Instruct patient on fluid and nutrition as appropriate  Outcome: Progressing  Goal: Fluid balance maintained  Description  INTERVENTIONS:  - Monitor labs   - Monitor I/O and WT  - Instruct patient on fluid and nutrition as appropriate  - Assess for signs & symptoms of volume excess or deficit  Outcome: Progressing     Problem: MUSCULOSKELETAL - ADULT  Goal: Maintain or return mobility to safest level of function  Description  INTERVENTIONS:  - Assess patient's ability to carry out ADLs; assess patient's baseline for ADL function and identify physical deficits which impact ability to perform ADLs (bathing, care of mouth/teeth, toileting, grooming, dressing, etc )  - Assess/evaluate cause of self-care deficits   - Assess range of motion  - Assess patient's mobility  - Assess patient's need for assistive devices and provide as appropriate  - Encourage maximum independence but intervene and supervise when necessary  - Involve family in performance of ADLs  - Assess for home care needs following discharge   - Consider OT consult to assist with ADL evaluation and planning for discharge  - Provide patient education as appropriate  Outcome: Progressing  Goal: Maintain proper alignment of affected body part  Description  INTERVENTIONS:  - Support, maintain and protect limb and body alignment  - Provide patient/ family with appropriate education  Outcome: Progressing     Problem: Nutrition/Hydration-ADULT  Goal: Nutrient/Hydration intake appropriate for improving, restoring or maintaining nutritional needs  Description  Monitor and assess patient's nutrition/hydration status for malnutrition  Collaborate with interdisciplinary team and initiate plan and interventions as ordered  Monitor patient's weight and dietary intake as ordered or per policy  Utilize nutrition screening tool and intervene as necessary  Determine patient's food preferences and provide high-protein, high-caloric foods as appropriate       INTERVENTIONS:  - Monitor oral intake, urinary output, labs, and treatment plans  - Assess nutrition and hydration status and recommend course of action  - Evaluate amount of meals eaten  - Assist patient with eating if necessary   - Allow adequate time for meals  - Recommend/ encourage appropriate diets, oral nutritional supplements, and vitamin/mineral supplements  - Order, calculate, and assess calorie counts as needed  - Recommend, monitor, and adjust tube feedings and TPN/PPN based on assessed needs  - Assess need for intravenous fluids  - Provide specific nutrition/hydration education as appropriate  - Include patient/family/caregiver in decisions related to nutrition  Outcome: Progressing     Problem: COPING  Goal: Pt/Family able to verbalize concerns and demonstrate effective coping strategies  Description  INTERVENTIONS:  - Assist patient/family to identify coping skills, available support systems and cultural and spiritual values  - Provide emotional support, including active listening and acknowledgement of concerns of patient and caregivers  - Reduce environmental stimuli, as able  - Provide patient education  - Assess for spiritual pain/suffering and initiate spiritual care, including notification of Pastoral Care or [place] based community as needed  - Assess effectiveness of coping strategies  Outcome: Progressing  Goal: Will report anxiety at manageable levels  Description  INTERVENTIONS:  - Administer medication as ordered  - Teach and encourage coping skills  - Provide emotional support  - Assess patient/family for anxiety and ability to cope  Outcome: Progressing     Problem: CARDIOVASCULAR - ADULT  Goal: Maintains optimal cardiac output and hemodynamic stability  Description  INTERVENTIONS:  - Monitor I/O, vital signs and rhythm  - Monitor for S/S and trends of decreased cardiac output  - Administer and titrate ordered vasoactive medications to optimize hemodynamic stability  - Assess quality of pulses, skin color and temperature  - Assess for signs of decreased coronary artery perfusion  - Instruct patient to report change in severity of symptoms  Outcome: Progressing  Goal: Absence of cardiac dysrhythmias or at baseline rhythm  Description  INTERVENTIONS:  - Continuous cardiac monitoring, vital signs, obtain 12 lead EKG if ordered  - Administer antiarrhythmic and heart rate control medications as ordered  - Monitor electrolytes and administer replacement therapy as ordered  Outcome: Progressing

## 2020-02-01 NOTE — PROGRESS NOTES
Pt refusing tlso brace; educated importance of brace; pt continues to decline; states "it bothers me   It doesn't fit right";

## 2020-02-01 NOTE — PROGRESS NOTES
Cardiology Progress Note - Luis Cm 82 y o  female MRN: [id number]    Unit/Bed#: [facility] 802-01 Encounter: [id number]    Assessment and plan  1  Paroxysmal atrial fibrillation with RVR  2  Gastric outlet obstruction  3  Preserved left ventricular ejection fraction  4  COPD    Recommendations:  Patient continues to remain in normal sinus rhythm  From a cardiovascular standpoint she is stable  Blood pressure has been marginal some of this may be volume  Surgical notes reviewed plan for possible stent early this week need to consider nutrition at this point time  Question TPN  Continue IV Lopressor as blood pressure will tolerate  No anticoagulant until surgical planning is define    Subjective:    No significant events overnight  Denies chest pain, palpitations, lightheadedness, dizziness, shortness of breath  nROS    Ob  jective:   Vitals: Blood pressure 91/58, pulse 96, temperature 98 °F (36 7 °C), resp  rate 20, height 5' (1 524 m), weight 64 7 kg (142 lb 11 2 oz), SpO2 92 %, not currently breastfeeding , Body mass index is 27 87 kg/m² ,   Orthostatic Blood Pressures      Most Recent Value   Blood Pressure  91/58 filed at 02/01/2020 0759   Patient Position - Orthostatic VS  Lying filed at 01/29/2020 9809         Systolic (03HTW), MSM:625 , Min:91 , FOA:544     Diastolic (32JIM), CSD:15, Min:56, Max:69      Intake/Output Summary (Last 24 hours) at 2/1/2020 0935  Last data filed at 2/1/2020 0630  Gross per 24 hour   Intake 5446 67 ml   Output 1500 ml   Net 3946 67 ml     Weight (last 2 days)     Date/Time   Weight    01/30/20 1241   64 7 (142 7)                Telemetry Review: No significant arrhythmias seen on telemetry review  EKG personally reviewed by Ortega Chapman DO       Physical Exam:  Vital signs reviewed  General:  Alert and cooperative, appears stated age, no acute distress  HEENT:  PERRLA, EOMI, no scleral icterus, no conjunctival pallor  Neck:  No lymphadenopathy, no thyromegaly, no carotid bruits, no elevated JVP  Heart:  Regular rate and rhythm, normal S1/S2, no S3/S4, no murmur, rubs or gallops  PMI nondisplaced  Lungs:  Clear to auscultation bilaterally, no wheezes rales or rhonchi  Abdomen:  Soft, non-tender, positive bowel sounds, no rebound or guarding,   no organomegaly   Extremities:  Normal range of motion    No clubbing, cyanosis or edema   Vascular:  2+ pedal pulses  Skin:  No rashes or lesions on exposed skin  Neurologic:  Cranial nerves II-XII grossly intact without focal deficits        Laboratory Results:        CBC with diff:   Results from last 7 days   Lab Units 02/01/20  0849 01/31/20  0438 01/30/20  0449 01/29/20  0634 01/28/20  0525 01/27/20  0606 01/26/20  0554   WBC Thousand/uL 10 86* 13 85* 9 28 6 84 8 96 9 56 11 68*   HEMOGLOBIN g/dL 10 4* 10 6* 11 6 11 3* 11 7 12 0 12 4   HEMATOCRIT % 34 9 35 9 38 2 37 9 38 0 39 4 40 0   MCV fL 86 87 86 87 86 86 85   PLATELETS Thousands/uL 252 298 360 309 317 319 306   MCH pg 25 6* 25 7* 26 1* 25 8* 26 5* 26 2* 26 2*   MCHC g/dL 29 8* 29 5* 30 4* 29 8* 30 8* 30 5* 31 0*   RDW % 16 2* 16 1* 16 0* 15 9* 16 2* 16 1* 15 8*   MPV fL 9 0 9 5 9 7 9 8 10 4 10 1 10 1   NRBC AUTO /100 WBCs 0 0 0 0  --  0 0         CMP:  Results from last 7 days   Lab Units 02/01/20  0850 01/31/20  0825 01/31/20  0438 01/30/20  0449 01/29/20  0643 01/28/20  0525 01/27/20  0606   POTASSIUM mmol/L 3 8 3 6 4 0 4 4 3 7 4 0 3 6   CHLORIDE mmol/L 108 105 106 109* 109* 109* 107   CO2 mmol/L 28 27 28 27 26 25 26   BUN mg/dL 3* 2* 2* 1* 1* 2* 4*   CREATININE mg/dL 0 44* 0 60 0 56* 0 53* 0 52* 0 49* 0 49*   CALCIUM mg/dL 8 7 8 6 8 4 9 0 9 0 9 2 9 0   AST U/L  --  17  --   --   --   --   --    ALT U/L  --  30  --   --   --   --   --    ALK PHOS U/L  --  94  --   --   --   --   --    EGFR ml/min/1 73sq m 94 85 87 89 89 91 91         BMP:  Results from last 7 days   Lab Units 02/01/20  0850 01/31/20  0825 01/31/20  4634 01/30/20  0449 01/29/20  5802 20  0525 20  0606   POTASSIUM mmol/L 3 8 3 6 4 0 4 4 3 7 4 0 3 6   CHLORIDE mmol/L 108 105 106 109* 109* 109* 107   CO2 mmol/L  28 27 26 25 26   BUN mg/dL 3* 2* 2* 1* 1* 2* 4*   CREATININE mg/dL 0 44* 0 60 0 56* 0 53* 0 52* 0 49* 0 49*   CALCIUM mg/dL 8 7 8 6 8 4 9 0 9 0 9 2 9 0       BNP: No results for input(s): BNP in the last 72 hours  Magnesium:   Results from last 7 days   Lab Units 20  0825 20  0449 20  0643   MAGNESIUM mg/dL 1 8 2 2 1 7       Coags:       TSH:        Hemoglobin A1C       Lipid Profile:   Results from last 7 days   Lab Units 20  0825   TRIGLYCERIDES mg/dL 151*       Cardiac testing:   Results for orders placed during the hospital encounter of 10/06/19   Echo complete with contrast if indicated    [address]    Transthoracic Echocardiogram  2D, M-mode, Doppler, and Color Doppler    Study date:  07-Oct-2019    Patient: Nette Enrique  MR number: [id number]  Account number: [de-identified]  : 1935  Age: 82 years  Gender: Female  Status: Inpatient  [facility]: [facility]   Height: 60 in  Weight: 164 lb  BP: 88/ 53 mmHg    Indications: Afib    Diagnoses: I48 0 - Atrial fibrillation    Sonographer:  Shimon Cai RDCS  Referring Physician:  Luis Contreras MD  Group:  [facility]  Interpreting Physician:  Mee Osuna MD    SUMMARY    LEFT VENTRICLE:  Systolic function was normal  Ejection fraction was estimated to be 60 %  There were no regional wall motion abnormalities  There was mild concentric hypertrophy  Doppler parameters were consistent with abnormal left ventricular relaxation (grade 1 diastolic dysfunction)  MITRAL VALVE:  There was mild annular calcification  There was trace regurgitation  AORTIC VALVE:  There was mild regurgitation  TRICUSPID VALVE:  There was trace regurgitation      HISTORY: PRIOR HISTORY: Congestive heart failure  Atrial fibrillation  Chronic lung disease  PROCEDURE: The study was performed in the [facility]  This was a routine study  The transthoracic approach was used  The study included complete 2D imaging, M-mode, complete spectral Doppler, and color Doppler  The  heart rate was 92 bpm, at the start of the study  Images were obtained from the parasternal, apical, subcostal, and suprasternal notch acoustic windows  Echocardiographic views were limited due to poor acoustic window availability,  decreased penetration, and lung interference  This was a technically difficult study  LEFT VENTRICLE: Size was normal  Systolic function was normal  Ejection fraction was estimated to be 60 %  There were no regional wall motion abnormalities  Wall thickness was mildly increased  There was mild concentric hypertrophy  DOPPLER: Doppler parameters were consistent with abnormal left ventricular relaxation (grade 1 diastolic dysfunction)  RIGHT VENTRICLE: The size was normal  Systolic function was normal  Wall thickness was normal     LEFT ATRIUM: Size was normal     RIGHT ATRIUM: Size was normal     MITRAL VALVE: There was mild annular calcification  Valve structure was normal  There was normal leaflet separation  DOPPLER: The transmitral velocity was within the normal range  There was no evidence for stenosis  There was trace  regurgitation  AORTIC VALVE: The valve was not well visualized  DOPPLER: Transaortic velocity was within the normal range  There was no evidence for stenosis  There was mild regurgitation  TRICUSPID VALVE: The valve structure was normal  There was normal leaflet separation  DOPPLER: The transtricuspid velocity was within the normal range  There was no evidence for stenosis  There was trace regurgitation  Pulmonary artery  systolic pressure was moderately increased  Estimated peak PA pressure was 65 mmHg   The findings suggest moderate pulmonary hypertension  PULMONIC VALVE: Not well visualized  DOPPLER: The transpulmonic velocity was within the normal range  There was no significant regurgitation  PERICARDIUM: There was no pericardial effusion  The pericardium was normal in appearance  AORTA: The root exhibited normal size  SYSTEMIC VEINS: IVC: The inferior vena cava was upper normal     SYSTEM MEASUREMENT TABLES    2D  %FS: 30 38 %  AV Diam: 3 11 cm  EDV(Teich): 61 91 ml  EF(Teich): 58 53 %  ESV(Teich): 25 68 ml  IVSd: 1 15 cm  LA Area: 16 7 cm2  LA Diam: 3 58 cm  LVEDV MOD A4C: 81 9 ml  LVEF MOD A4C: 56 03 %  LVESV MOD A4C: 36 01 ml  LVIDd: 3 8 cm  LVIDs: 2 65 cm  LVLd A4C: 6 83 cm  LVLs A4C: 5 86 cm  LVPWd: 1 12 cm  RA Area: 14 56 cm2  RV Diam : 3 27 cm  SV MOD A4C: 45 89 ml  SV(Cube): 36 32 ml  SV(Teich): 36 24 ml    CW  AR Dec [place]: 1 94 m/s2  AR Dec Time: 1840 69 ms  AR PHT: 533 8 ms  AR Vmax: 3 58 m/s  AR maxP 22 mmHg  TR Vmax: 3 49 m/s  TR maxP 16 mmHg    MM  TAPSE: 2 16 cm    PW  E': 0 07 m/s  E/E': 13 57  MV A [place]: 1 44 m/s  MV Dec [place]: 2 9 m/s2  MV DecT: 337 71 ms  MV E [place]: 0 98 m/s  MV E/A Ratio: 0 68    [facility] Accredited Echocardi[facility] Laboratory    Prepared and electronically signed by    Shelby Appiah MD  Signed 07-Oct-2019 21:51:59       No results found for this or any previous visit  No results found for this or any previous visit  No results found for this or any previous visit      Meds/Allergies   all current active meds have been reviewed  Medications Prior to Admission   Medication    albuterol (PROVENTIL HFA,VENTOLIN HFA) 90 mcg/act inhaler    alendronate (FOSAMAX) 70 mg tablet    ALPRAZolam (XANAX) 0 5 mg tablet    apixaban (ELIQUIS) 5 mg    Calcium Carb-Cholecalciferol (CALCIUM 1000 + D PO)    cholestyramine sugar free (QUESTRAN LIGHT) 4 g packet    diltiazem (CARDIZEM CD) 240 mg 24 hr capsule    fluticasone-umeclidinium-vilanterol (TRELEGY ELLIPTA) 100-62 5-25 MCG/INH inhaler    ipratropium-albuterol (DUO-NEB) 0 5-2 5 mg/3 mL nebulizer solution    iron polysaccharides (FERREX) 150 mg capsule    loperamide (IMODIUM) 2 mg capsule    metoprolol tartrate (LOPRESSOR) 25 mg tablet    montelukast (SINGULAIR) 10 mg tablet    pantoprazole (PROTONIX) 40 mg tablet    potassium chloride (MICRO-K) 10 MEQ CR capsule    psyllium (METAMUCIL) packet         Adult TPN (STANDARD BASE/STANDARD ELECTROLYTE)     dextrose 5 % and sodium chloride 0 45 % with KCl 20 mEq/L 100 mL/hr Last Rate: 100 mL/hr (02/01/20 0002)   diltiazem 1-15 mg/hr Last Rate: Stopped (01/30/20 2022)     Assessment:  Principal Problem:    Gastric outlet obstruction  Active Problems:    COPD (chronic obstructive pulmonary disease) (HCC)    Colorectal polyps    Chronic low back pain    Atrial fibrillation with rapid ventricular response (HCC)    Compression fracture of L4 vertebra (HCC)    Closed wedge compression fracture of T12 vertebra (HCC)

## 2020-02-01 NOTE — PROGRESS NOTES
Progress Note - General Surgery   Ida Stroud 82 y o  female MRN: [id number]  Unit/Bed#: [facility] 802-01 Encounter: [id number]    Assessment:  82 y o  F who presented with a gastric outlet obstruction secondary to unknown etiology - possible Malignancy vs peptic stricture based on EGD from 1/30    Plan:  Obtain PICC today - ordered  Continue TPN  GI to re-scope Tuesday for attempted stent / dilation  Surg onc on board should surgical gastro-J be needed  F/U pathology    Subjective/Objective   Chief Complaint:     Subjective: Complaints of abdominal pain under her ribs b/l   Denies flatus but no N/V    Objective:     Blood pressure 91/58, pulse 88, temperature 98 °F (36 7 °C), resp  rate 20, height 5' (1 524 m), weight 64 7 kg (142 lb 11 2 oz), SpO2 96 %, not currently breastfeeding  ,Body mass index is 27 87 kg/m²        Intake/Output Summary (Last 24 hours) at 2/1/2020 0648  Last data filed at 2/1/2020 0411  Gross per 24 hour   Intake 4800 ml   Output 1600 ml   Net 3200 ml       Invasive Devices     Peripheral Intravenous Line            Peripheral IV 01/31/20 Right Hand less than 1 day                Physical Exam:   Gen: A&O, NAD  Cardio: RRR  Lungs: CTAB  Abd: Soft, non distended, non tender      Lab, Imaging and other studies:  CBC: No results found for: WBC, HGB, HCT, MCV, PLT, ADJUSTEDWBC, MCH, MCHC, RDW, MPV, NRBC, CMP:   Lab Results   Component Value Date    SODIUM 137 01/31/2020    K 3 6 01/31/2020     01/31/2020    CO2 27 01/31/2020    BUN 2 (L) 01/31/2020    CREATININE 0 60 01/31/2020    CALCIUM 8 6 01/31/2020    AST 17 01/31/2020    ALT 30 01/31/2020    ALKPHOS 94 01/31/2020    EGFR 85 01/31/2020   , Coagulation: No results found for: PT, INR, APTT  VTE Pharmacologic Prophylaxis: Heparin  VTE Mechanical Prophylaxis: sequential compression device

## 2020-02-01 NOTE — PROGRESS NOTES
Blood cultures have no growth at 24 hours only now  Spoke to [facility], will insert PICC

## 2020-02-01 NOTE — SOCIAL WORK
CM reviewed patient chart  GI scheduled to re-scope patient Tuesday 2/4/20 for attempted stent/dilation  CM following

## 2020-02-01 NOTE — PLAN OF CARE

## 2020-02-01 NOTE — PROGRESS NOTES
Progress Note - Oncology Surgery   Catalina Baker 82 y o  female MRN: [id number]  Unit/Bed#: [facility] 802-01 Encounter: [id number]    Assessment:  82 y o  F who presented with a gastric outlet obstruction secondary to unknown etiology - possible Malignancy vs peptic stricture based on EGD from 1/30    Plan:  Obtain PICC today - ordered  Continue TPN  GI to re-scope Tuesday for attempted stent / dilation  Will evaluate at that time whether surgical gastro-J will be needed  F/U pathology    Subjective/Objective   Chief Complaint:     Subjective: Complaints of abdominal pain under her ribs b/l   Denies flatus but no N/V    Objective:     Blood pressure 91/58, pulse 88, temperature 98 °F (36 7 °C), resp  rate 20, height 5' (1 524 m), weight 64 7 kg (142 lb 11 2 oz), SpO2 96 %, not currently breastfeeding  ,Body mass index is 27 87 kg/m²        Intake/Output Summary (Last 24 hours) at 2/1/2020 8531  Last data filed at 2/1/2020 0411  Gross per 24 hour   Intake 4800 ml   Output 1600 ml   Net 3200 ml       Invasive Devices     Peripheral Intravenous Line            Peripheral IV 01/31/20 Right Hand less than 1 day                Physical Exam:     Gen: A&O, NAD  Cardio: RRR  Lungs: CTAB  Abd: Soft, non distended, non tender      Lab, Imaging and other studies:  CBC: No results found for: WBC, HGB, HCT, MCV, PLT, ADJUSTEDWBC, MCH, MCHC, RDW, MPV, NRBC, CMP:   Lab Results   Component Value Date    SODIUM 137 01/31/2020    K 3 6 01/31/2020     01/31/2020    CO2 27 01/31/2020    BUN 2 (L) 01/31/2020    CREATININE 0 60 01/31/2020    CALCIUM 8 6 01/31/2020    AST 17 01/31/2020    ALT 30 01/31/2020    ALKPHOS 94 01/31/2020    EGFR 85 01/31/2020   , Coagulation: No results found for: PT, INR, APTT  VTE Pharmacologic Prophylaxis: Heparin  VTE Mechanical Prophylaxis: sequential compression device

## 2020-02-01 NOTE — PROCEDURES
Insert PICC line  Date/Time: 2/1/2020 1:11 PM  Performed by: Vlad Knowles RN  Authorized by: Lizet Herrera MD     Patient location:  Bedside  Other Assisting Provider: Yes (comment) Andie Santamaria Infusion Tech)    Consent:     Consent obtained:  Written    Consent given by:  Patient    Procedural risks discussed: when obtaining consent  Alternatives discussed: when obtaining consent  Universal protocol:     Procedure explained and questions answered to patient or proxy's satisfaction: yes      Relevant documents present and verified: yes      Test results available and properly labeled: yes      Radiology Images displayed and confirmed  If images not available, report reviewed: yes      Required blood products, implants, devices, and special equipment available: yes      Site/side marked: yes      Immediately prior to procedure, a time out was called: yes      Patient identity confirmed:  Verbally with patient and arm band  Pre-procedure details:     Hand hygiene: Hand hygiene performed prior to insertion      Sterile barrier technique: All elements of maximal sterile technique followed      Skin preparation:  ChloraPrep    Skin preparation agent: Skin preparation agent completely dried prior to procedure    Indications:     PICC line indications: total parenteral nutrition    Anesthesia (see MAR for exact dosages):      Anesthesia method:  Local infiltration    Local anesthetic:  Lidocaine 1% w/o epi (2 mls)  Procedure details:     Location:  Brachial    Vessel type: vein      Laterality:  Right    Approach: percutaneous technique used      Patient position:  Flat    Procedural supplies:  Double lumen    Catheter size:  5 Fr    Landmarks identified: yes      Ultrasound guidance: yes      Sterile ultrasound techniques: Sterile gel and sterile probe covers were used      Number of attempts:  1    Successful placement: yes      Vessel of catheter tip end:  [place] 3CG confirmed (ok to use, no xray required)    Total catheter length (cm):  36    Catheter out on skin (cm):  0    Max flow rate:  999    Arm circumference:  25  Post-procedure details:     Post-procedure:  Dressing applied and securement device placed    Assessment:  Blood return through all ports    Post-procedure complications: none      Patient tolerance of procedure:   Tolerated with difficulty

## 2020-02-01 NOTE — PROGRESS NOTES
PICC consult received yesterday, patient was febrile with blood cultures just drawn therefore PICC team unable to place line  Will reassess PICC line placement this afternoon when preliminary cultures are resulted

## 2020-02-02 ENCOUNTER — ANESTHESIA EVENT (INPATIENT)
Dept: PERIOP | Facility: HOSPITAL | Age: 85
DRG: 326 | End: 2020-02-02
Payer: MEDICARE

## 2020-02-02 LAB
ALBUMIN SERPL BCP-MCNC: 2.1 G/DL (ref 3.5–5)
ALP SERPL-CCNC: 112 U/L (ref 46–116)
ALT SERPL W P-5'-P-CCNC: 18 U/L (ref 12–78)
ANION GAP SERPL CALCULATED.3IONS-SCNC: 5 MMOL/L (ref 4–13)
AST SERPL W P-5'-P-CCNC: 10 U/L (ref 5–45)
BACTERIA UR CULT: ABNORMAL
BASOPHILS # BLD AUTO: 0.01 THOUSANDS/ΜL (ref 0–0.1)
BASOPHILS NFR BLD AUTO: 0 % (ref 0–1)
BILIRUB SERPL-MCNC: 0.29 MG/DL (ref 0.2–1)
BUN SERPL-MCNC: 5 MG/DL (ref 5–25)
CALCIUM SERPL-MCNC: 8.7 MG/DL (ref 8.3–10.1)
CHLORIDE SERPL-SCNC: 106 MMOL/L (ref 100–108)
CO2 SERPL-SCNC: 30 MMOL/L (ref 21–32)
CREAT SERPL-MCNC: 0.4 MG/DL (ref 0.6–1.3)
EOSINOPHIL # BLD AUTO: 0.15 THOUSAND/ΜL (ref 0–0.61)
EOSINOPHIL NFR BLD AUTO: 2 % (ref 0–6)
ERYTHROCYTE [DISTWIDTH] IN BLOOD BY AUTOMATED COUNT: 15.9 % (ref 11.6–15.1)
GFR SERPL CREATININE-BSD FRML MDRD: 97 ML/MIN/1.73SQ M
GLUCOSE SERPL-MCNC: 123 MG/DL (ref 65–140)
HCT VFR BLD AUTO: 32.4 % (ref 34.8–46.1)
HGB BLD-MCNC: 9.9 G/DL (ref 11.5–15.4)
IMM GRANULOCYTES # BLD AUTO: 0.08 THOUSAND/UL (ref 0–0.2)
IMM GRANULOCYTES NFR BLD AUTO: 1 % (ref 0–2)
LYMPHOCYTES # BLD AUTO: 0.84 THOUSANDS/ΜL (ref 0.6–4.47)
LYMPHOCYTES NFR BLD AUTO: 10 % (ref 14–44)
MAGNESIUM SERPL-MCNC: 2.2 MG/DL (ref 1.6–2.6)
MCH RBC QN AUTO: 26.4 PG (ref 26.8–34.3)
MCHC RBC AUTO-ENTMCNC: 30.6 G/DL (ref 31.4–37.4)
MCV RBC AUTO: 86 FL (ref 82–98)
MONOCYTES # BLD AUTO: 0.46 THOUSAND/ΜL (ref 0.17–1.22)
MONOCYTES NFR BLD AUTO: 5 % (ref 4–12)
NEUTROPHILS # BLD AUTO: 7.26 THOUSANDS/ΜL (ref 1.85–7.62)
NEUTS SEG NFR BLD AUTO: 82 % (ref 43–75)
NRBC BLD AUTO-RTO: 0 /100 WBCS
PHOSPHATE SERPL-MCNC: 4.6 MG/DL (ref 2.3–4.1)
PLATELET # BLD AUTO: 254 THOUSANDS/UL (ref 149–390)
PMV BLD AUTO: 9.4 FL (ref 8.9–12.7)
POTASSIUM SERPL-SCNC: 3.5 MMOL/L (ref 3.5–5.3)
PROT SERPL-MCNC: 6 G/DL (ref 6.4–8.2)
RBC # BLD AUTO: 3.75 MILLION/UL (ref 3.81–5.12)
SODIUM SERPL-SCNC: 141 MMOL/L (ref 136–145)
WBC # BLD AUTO: 8.8 THOUSAND/UL (ref 4.31–10.16)

## 2020-02-02 PROCEDURE — 94640 AIRWAY INHALATION TREATMENT: CPT

## 2020-02-02 PROCEDURE — 99232 SBSQ HOSP IP/OBS MODERATE 35: CPT | Performed by: SURGERY

## 2020-02-02 PROCEDURE — 80053 COMPREHEN METABOLIC PANEL: CPT | Performed by: SURGERY

## 2020-02-02 PROCEDURE — C9113 INJ PANTOPRAZOLE SODIUM, VIA: HCPCS | Performed by: INTERNAL MEDICINE

## 2020-02-02 PROCEDURE — 83735 ASSAY OF MAGNESIUM: CPT | Performed by: SURGERY

## 2020-02-02 PROCEDURE — 94760 N-INVAS EAR/PLS OXIMETRY 1: CPT

## 2020-02-02 PROCEDURE — 99232 SBSQ HOSP IP/OBS MODERATE 35: CPT | Performed by: INTERNAL MEDICINE

## 2020-02-02 PROCEDURE — 84100 ASSAY OF PHOSPHORUS: CPT | Performed by: SURGERY

## 2020-02-02 PROCEDURE — 85025 COMPLETE CBC W/AUTO DIFF WBC: CPT | Performed by: ORTHOPAEDIC SURGERY

## 2020-02-02 RX ORDER — METOCLOPRAMIDE HYDROCHLORIDE 5 MG/ML
10 INJECTION INTRAMUSCULAR; INTRAVENOUS EVERY 6 HOURS PRN
Status: DISCONTINUED | OUTPATIENT
Start: 2020-02-02 | End: 2020-02-03

## 2020-02-02 RX ORDER — CEFAZOLIN SODIUM 1 G/50ML
1000 SOLUTION INTRAVENOUS EVERY 12 HOURS
Status: DISCONTINUED | OUTPATIENT
Start: 2020-02-02 | End: 2020-02-09

## 2020-02-02 RX ADMIN — METOPROLOL TARTRATE 5 MG: 5 INJECTION, SOLUTION INTRAVENOUS at 05:11

## 2020-02-02 RX ADMIN — Medication 1 SPRAY: at 11:54

## 2020-02-02 RX ADMIN — BISACODYL 10 MG: 10 SUPPOSITORY RECTAL at 07:54

## 2020-02-02 RX ADMIN — IPRATROPIUM BROMIDE 0.5 MG: 0.5 SOLUTION RESPIRATORY (INHALATION) at 19:26

## 2020-02-02 RX ADMIN — HEPARIN SODIUM 5000 UNITS: 5000 INJECTION INTRAVENOUS; SUBCUTANEOUS at 05:09

## 2020-02-02 RX ADMIN — LEVALBUTEROL HYDROCHLORIDE 1.25 MG: 1.25 SOLUTION, CONCENTRATE RESPIRATORY (INHALATION) at 07:17

## 2020-02-02 RX ADMIN — HYDROMORPHONE HYDROCHLORIDE 0.2 MG: 1 INJECTION, SOLUTION INTRAMUSCULAR; INTRAVENOUS; SUBCUTANEOUS at 11:10

## 2020-02-02 RX ADMIN — LEVALBUTEROL HYDROCHLORIDE 1.25 MG: 1.25 SOLUTION, CONCENTRATE RESPIRATORY (INHALATION) at 13:25

## 2020-02-02 RX ADMIN — LEVALBUTEROL HYDROCHLORIDE 1.25 MG: 1.25 SOLUTION, CONCENTRATE RESPIRATORY (INHALATION) at 19:26

## 2020-02-02 RX ADMIN — ONDANSETRON 4 MG: 2 INJECTION INTRAMUSCULAR; INTRAVENOUS at 13:43

## 2020-02-02 RX ADMIN — DEXTRAN 70 AND HYPROMELLOSE 2910 1 DROP: 1; 3 SOLUTION/ DROPS OPHTHALMIC at 16:52

## 2020-02-02 RX ADMIN — PANTOPRAZOLE SODIUM 40 MG: 40 INJECTION, POWDER, FOR SOLUTION INTRAVENOUS at 21:19

## 2020-02-02 RX ADMIN — IPRATROPIUM BROMIDE 0.5 MG: 0.5 SOLUTION RESPIRATORY (INHALATION) at 13:25

## 2020-02-02 RX ADMIN — HEPARIN SODIUM 5000 UNITS: 5000 INJECTION INTRAVENOUS; SUBCUTANEOUS at 21:19

## 2020-02-02 RX ADMIN — CEFAZOLIN SODIUM 1000 MG: 1 SOLUTION INTRAVENOUS at 21:18

## 2020-02-02 RX ADMIN — DEXTRAN 70 AND HYPROMELLOSE 2910 1 DROP: 1; 3 SOLUTION/ DROPS OPHTHALMIC at 07:53

## 2020-02-02 RX ADMIN — HYDROMORPHONE HYDROCHLORIDE 0.2 MG: 1 INJECTION, SOLUTION INTRAMUSCULAR; INTRAVENOUS; SUBCUTANEOUS at 16:51

## 2020-02-02 RX ADMIN — Medication 1 SPRAY: at 00:29

## 2020-02-02 RX ADMIN — METOCLOPRAMIDE 10 MG: 5 INJECTION, SOLUTION INTRAMUSCULAR; INTRAVENOUS at 17:32

## 2020-02-02 RX ADMIN — CALCIUM GLUCONATE: 98 INJECTION, SOLUTION INTRAVENOUS at 21:19

## 2020-02-02 RX ADMIN — DEXTRAN 70 AND HYPROMELLOSE 2910 1 DROP: 1; 3 SOLUTION/ DROPS OPHTHALMIC at 21:35

## 2020-02-02 RX ADMIN — METOPROLOL TARTRATE 5 MG: 5 INJECTION, SOLUTION INTRAVENOUS at 16:51

## 2020-02-02 RX ADMIN — IPRATROPIUM BROMIDE 0.5 MG: 0.5 SOLUTION RESPIRATORY (INHALATION) at 07:17

## 2020-02-02 RX ADMIN — METOPROLOL TARTRATE 5 MG: 5 INJECTION, SOLUTION INTRAVENOUS at 23:41

## 2020-02-02 RX ADMIN — HYDROMORPHONE HYDROCHLORIDE 0.2 MG: 1 INJECTION, SOLUTION INTRAMUSCULAR; INTRAVENOUS; SUBCUTANEOUS at 05:09

## 2020-02-02 RX ADMIN — METOPROLOL TARTRATE 5 MG: 5 INJECTION, SOLUTION INTRAVENOUS at 11:10

## 2020-02-02 RX ADMIN — Medication 1 SPRAY: at 11:53

## 2020-02-02 RX ADMIN — HEPARIN SODIUM 5000 UNITS: 5000 INJECTION INTRAVENOUS; SUBCUTANEOUS at 13:35

## 2020-02-02 RX ADMIN — BACITRACIN ZINC, NEOMYCIN SULFATE, AND POLYMYXIN B SULFATE 1 SMALL APPLICATION: 400; 3.5; 5 OINTMENT TOPICAL at 07:54

## 2020-02-02 RX ADMIN — HYDROMORPHONE HYDROCHLORIDE 0.2 MG: 1 INJECTION, SOLUTION INTRAMUSCULAR; INTRAVENOUS; SUBCUTANEOUS at 22:17

## 2020-02-02 RX ADMIN — PANTOPRAZOLE SODIUM 40 MG: 40 INJECTION, POWDER, FOR SOLUTION INTRAVENOUS at 07:54

## 2020-02-02 NOTE — ORTHOTIC NOTE
Orthotic Note            Date: 2/2/2020      Patient Name: Maru José            Reason for Consult:  Patient Active Problem List   Diagnosis    COPD (chronic obstructive pulmonary disease) ([facility] [address] )    Cardiac disease    Diverticulosis of intestine with bleeding    Diarrhea    Colorectal polyps    Effusion of bursa of left elbow    Cardiomegaly    Chronic anxiety    Chronic cystitis    Chronic low back pain    Congestive heart failure ([address] )    Deviated nasal septum    Diverticulosis of colon    Gastroesophageal reflux disease without esophagitis    Gout    History of angioedema    History of colonic polyps    Lumbar radiculopathy    Macular degeneration of both eyes    Obesity    Osteoporosis    Other specified forms of chronic ischemic heart disease    Seasonal allergies    Panic attack    Vocal cord dysfunction    Colitis presumed infectious    Atrial fibrillation with rapid ventricular response (HCC)    Left elbow pain    Chronic respiratory failure with hypoxia (HCC)    Debility    Positive culture findings in sputum    Compression fracture of L4 vertebra (HCC)    Gastric outlet obstruction    Closed wedge compression fracture of T12 vertebra (HCC)     [employer] [place] Backpack TLSO    Pt was complaining of improper brace fit, with her neck being rubbed and the brace riding up on her when she is seated  Educated pt on proper fit of brace and added extra padding to the bilateral shoulder straps to prevent the rubbing on her neck  Pt was agreeable to the adjustments and stated that the brace felt more comfortable after the adjustments  RN aware, orthotech will continue to follow  Recommendations:  Please call Mobility Coordinator at ext  [phone] in regards to bracing instruction and/or adjustment    Dusty Martinez Restorative Technician, BS

## 2020-02-02 NOTE — PROGRESS NOTES
Progress Note - Red Surgery  Robert Mortensen 82 y o  female MRN: [id number]  Unit/Bed#: [facility] 802-01 Encounter: [id number]    Assessment/Plan:  82 y o  female with a gastric outlet obstruction secondary to unknown etiology - possible Malignancy vs peptic stricture based on EGD from 1/30     - NPO  - Continue PICC  - TPN standard -> will advance TPN per dietician recommendations on 1/30  - GI to re-scope Tuesday for attempted stent  - Surgical oncology consulted  Appreciate recs regarding possible gastro-J  - f/u pathology    Subjective: Patient states that is hungry and thirsty  No nausea, vomiting, fevers or chills  Patient states that the TLSO has been causing discomfort  Objective:     Vitals: Temp:  [97 5 °F (36 4 °C)-98 2 °F (36 8 °C)] 97 6 °F (36 4 °C)  HR:  [] 91  Resp:  [16-28] 20  BP: ()/(58-73) 108/67  Body mass index is 27 87 kg/m²  I/O       01/31 0701 - 02/01 0700 02/01 0701 - 02/02 0700 02/02 0701 - 02/03 0700    P  O  0 0     I V  (mL/kg) 5446 7 (84 2) 865 (13 4)     Total Intake(mL/kg) 5446 7 (84 2) 865 (13 4)     Urine (mL/kg/hr) 1600 (1) 1400 (0 9)     Total Output 1600 1400     Net +3846 7 -535                  Physical Exam:  GEN: NAD  HEENT: MMM  CV: no palpable arrythmias  Lung: Normal effort  Ab: Soft, NT/ND  Extrem: Motor grossly intact  Neuro: A+Ox3    Lab, Imaging and other studies: I have personally reviewed pertinent reports      VTE Pharmacologic Prophylaxis: Heparin  VTE Mechanical Prophylaxis: sequential compression device

## 2020-02-02 NOTE — PLAN OF CARE
Problem: Prexisting or High Potential for Compromised Skin Integrity  Goal: Skin integrity is maintained or improved  Description  INTERVENTIONS:  - Identify patients at risk for skin breakdown  - Assess and monitor skin integrity  - Assess and monitor nutrition and hydration status  - Monitor labs   - Assess for incontinence   - Turn and reposition patient  - Assist with mobility/ambulation  - Relieve pressure over bony prominences  - Avoid friction and shearing  - Provide appropriate hygiene as needed including keeping skin clean and dry  - Evaluate need for skin moisturizer/barrier cream  - Collaborate with interdisciplinary team   - Patient/family teaching  - Consider wound care consult   Outcome: Progressing     Problem: Potential for Falls  Goal: Patient will remain free of falls  Description  INTERVENTIONS:  - Assess patient frequently for physical needs  -  Identify cognitive and physical deficits and behaviors that affect risk of falls    -  [place] fall precautions as indicated by assessment   - Educate patient/family on patient safety including physical limitations  - Instruct patient to call for assistance with activity based on assessment  - Modify environment to reduce risk of injury  - Consider OT/PT consult to assist with strengthening/mobility  Outcome: Progressing     Problem: PAIN - ADULT  Goal: Verbalizes/displays adequate comfort level or baseline comfort level  Description  Interventions:  - Encourage patient to monitor pain and request assistance  - Assess pain using appropriate pain scale  - Administer analgesics based on type and severity of pain and evaluate response  - Implement non-pharmacological measures as appropriate and evaluate response  - Consider cultural and social influences on pain and pain management  - Notify physician/advanced practitioner if interventions unsuccessful or patient reports new pain  Outcome: Progressing     Problem: INFECTION - ADULT  Goal: Absence or prevention of progression during hospitalization  Description  INTERVENTIONS:  - Assess and monitor for signs and symptoms of infection  - Monitor lab/diagnostic results  - Monitor all insertion sites, i e  indwelling lines, tubes, and drains  - Monitor endotracheal if appropriate and nasal secretions for changes in amount and color  - [place] appropriate cooling/warming therapies per order  - Administer medications as ordered  - Instruct and encourage patient and family to use good hand hygiene technique  - Identify and instruct in appropriate isolation precautions for identified infection/condition  Outcome: Progressing  Goal: Absence of fever/infection during neutropenic period  Description  INTERVENTIONS:  - Monitor WBC    Outcome: Progressing     Problem: SAFETY ADULT  Goal: Patient will remain free of falls  Description  INTERVENTIONS:  - Assess patient frequently for physical needs  -  Identify cognitive and physical deficits and behaviors that affect risk of falls    -  [place] fall precautions as indicated by assessment   - Educate patient/family on patient safety including physical limitations  - Instruct patient to call for assistance with activity based on assessment  - Modify environment to reduce risk of injury  - Consider OT/PT consult to assist with strengthening/mobility  Outcome: Progressing  Goal: Maintain or return to baseline ADL function  Description  INTERVENTIONS:  -  Assess patient's ability to carry out ADLs; assess patient's baseline for ADL function and identify physical deficits which impact ability to perform ADLs (bathing, care of mouth/teeth, toileting, grooming, dressing, etc )  - Assess/evaluate cause of self-care deficits   - Assess range of motion  - Assess patient's mobility; develop plan if impaired  - Assess patient's need for assistive devices and provide as appropriate  - Encourage maximum independence but intervene and supervise when necessary  - Involve family in performance of ADLs  - Assess for home care needs following discharge   - Consider OT consult to assist with ADL evaluation and planning for discharge  - Provide patient education as appropriate  Outcome: Progressing  Goal: Maintain or return mobility status to optimal level  Description  INTERVENTIONS:  - Assess patient's baseline mobility status (ambulation, transfers, stairs, etc )    - Identify cognitive and physical deficits and behaviors that affect mobility  - Identify mobility aids required to assist with transfers and/or ambulation (gait belt, sit-to-stand, lift, walker, cane, etc )  - [place] fall precautions as indicated by assessment  - Record patient progress and toleration of activity level on Mobility SBAR; progress patient to next Phase/Stage  - Instruct patient to call for assistance with activity based on assessment  - Consider rehabilitation consult to assist with strengthening/weightbearing, etc   Outcome: Progressing     Problem: DISCHARGE PLANNING  Goal: Discharge to home or other facility with appropriate resources  Description  INTERVENTIONS:  - Identify barriers to discharge w/patient and caregiver  - Arrange for needed discharge resources and transportation as appropriate  - Identify discharge learning needs (meds, wound care, etc )  - Arrange for interpretive services to assist at discharge as needed  - Refer to Case Management Department for coordinating discharge planning if the patient needs post-hospital services based on physician/advanced practitioner order or complex needs related to functional status, cognitive ability, or social support system  Outcome: Progressing     Problem: Knowledge Deficit  Goal: Patient/family/caregiver demonstrates understanding of disease process, treatment plan, medications, and discharge instructions  Description  Complete learning assessment and assess knowledge base    Interventions:  - Provide teaching at level of understanding  - Provide teaching via preferred learning methods  Outcome: Progressing     Problem: RESPIRATORY - ADULT  Goal: Achieves optimal ventilation and oxygenation  Description  INTERVENTIONS:  - Assess for changes in respiratory status  - Assess for changes in mentation and behavior  - Position to facilitate oxygenation and minimize respiratory effort  - Oxygen administered by appropriate delivery if ordered  - Initiate smoking cessation education as indicated  - Encourage broncho-pulmonary hygiene including cough, deep breathe, Incentive Spirometry  - Assess the need for suctioning and aspirate as needed  - Assess and instruct to report SOB or any respiratory difficulty  - Respiratory Therapy support as indicated  Outcome: Progressing     Problem: GASTROINTESTINAL - ADULT  Goal: Minimal or absence of nausea and/or vomiting  Description  INTERVENTIONS:  - Administer IV fluids if ordered to ensure adequate hydration  - Maintain NPO status until nausea and vomiting are resolved  - Nasogastric tube if ordered  - Administer ordered antiemetic medications as needed  - Provide nonpharmacologic comfort measures as appropriate  - Advance diet as tolerated, if ordered  - Consider nutrition services referral to assist patient with adequate nutrition and appropriate food choices  Outcome: Progressing  Goal: Maintains or returns to baseline bowel function  Description  INTERVENTIONS:  - Assess bowel function  - Encourage oral fluids to ensure adequate hydration  - Administer IV fluids if ordered to ensure adequate hydration  - Administer ordered medications as needed  - Encourage mobilization and activity  - Consider nutritional services referral to assist patient with adequate nutrition and appropriate food choices  Outcome: Progressing  Goal: Maintains adequate nutritional intake  Description  INTERVENTIONS:  - Monitor percentage of each meal consumed  - Identify factors contributing to decreased intake, treat as appropriate  - Assist with meals as needed  - Monitor I&O, weight, and lab values if indicated  - Obtain nutrition services referral as needed  Outcome: Progressing     Problem: METABOLIC, FLUID AND ELECTROLYTES - ADULT  Goal: Electrolytes maintained within normal limits  Description  INTERVENTIONS:  - Monitor labs and assess patient for signs and symptoms of electrolyte imbalances  - Administer electrolyte replacement as ordered  - Monitor response to electrolyte replacements, including repeat lab results as appropriate  - Instruct patient on fluid and nutrition as appropriate  Outcome: Progressing  Goal: Fluid balance maintained  Description  INTERVENTIONS:  - Monitor labs   - Monitor I/O and WT  - Instruct patient on fluid and nutrition as appropriate  - Assess for signs & symptoms of volume excess or deficit  Outcome: Progressing     Problem: MUSCULOSKELETAL - ADULT  Goal: Maintain or return mobility to safest level of function  Description  INTERVENTIONS:  - Assess patient's ability to carry out ADLs; assess patient's baseline for ADL function and identify physical deficits which impact ability to perform ADLs (bathing, care of mouth/teeth, toileting, grooming, dressing, etc )  - Assess/evaluate cause of self-care deficits   - Assess range of motion  - Assess patient's mobility  - Assess patient's need for assistive devices and provide as appropriate  - Encourage maximum independence but intervene and supervise when necessary  - Involve family in performance of ADLs  - Assess for home care needs following discharge   - Consider OT consult to assist with ADL evaluation and planning for discharge  - Provide patient education as appropriate  Outcome: Progressing  Goal: Maintain proper alignment of affected body part  Description  INTERVENTIONS:  - Support, maintain and protect limb and body alignment  - Provide patient/ family with appropriate education  Outcome: Progressing     Problem: Nutrition/Hydration-ADULT  Goal: Nutrient/Hydration intake Teach and encourage coping skills  - Provide emotional support  - Assess patient/family for anxiety and ability to cope  Outcome: Progressing     Problem: CARDIOVASCULAR - ADULT  Goal: Maintains optimal cardiac output and hemodynamic stability  Description  INTERVENTIONS:  - Monitor I/O, vital signs and rhythm  - Monitor for S/S and trends of decreased cardiac output  - Administer and titrate ordered vasoactive medications to optimize hemodynamic stability  - Assess quality of pulses, skin color and temperature  - Assess for signs of decreased coronary artery perfusion  - Instruct patient to report change in severity of symptoms  Outcome: Progressing  Goal: Absence of cardiac dysrhythmias or at baseline rhythm  Description  INTERVENTIONS:  - Continuous cardiac monitoring, vital signs, obtain 12 lead EKG if ordered  - Administer antiarrhythmic and heart rate control medications as ordered  - Monitor electrolytes and administer replacement therapy as ordered  Outcome: Progressing

## 2020-02-02 NOTE — PROGRESS NOTES
Pt complaining of nausea; states "I feel like I have to bring something up   It tastes like dead fish oil"; dr serrano aware

## 2020-02-02 NOTE — PROGRESS NOTES
Cardiology Progress Note - Andrés Castro 82 y o  female MRN: [id number]    Unit/Bed#: [facility] 802-01 Encounter: [id number]    Assessment and plan  1  Paroxysmal atrial fibrillation with RVR  2  Gastric outlet obstruction  3  Preserved left ventricular ejection fraction  4  COPD    Recommendations:  Patient continues to remain in normal sinus rhythm  TPN has been started   She is stable from a cardiovascular standpoint acceptable CV risk for procedure this week  Continue IV Lopressor as blood pressure will tolerate  No anticoagulant until surgical planning is defined    Subjective:    No significant events overnight  Denies chest pain, shortness of breath, palpitations  Complains of being hungry  nROS    Ob  jective:   Vitals: Blood pressure 114/70, pulse 87, temperature 98 3 °F (36 8 °C), resp  rate 18, height 5' (1 524 m), weight 64 7 kg (142 lb 11 2 oz), SpO2 97 %, not currently breastfeeding , Body mass index is 27 87 kg/m² ,   Orthostatic Blood Pressures      Most Recent Value   Blood Pressure  114/70 filed at 2020 0742   Patient Position - Orthostatic VS  Lying filed at 2020 6825         Systolic (03LEZ), YA , Min:108 , OIU:991     Diastolic (61LWD), DDQ:67, Min:67, Max:73      Intake/Output Summary (Last 24 hours) at 2020 0911  Last data filed at 2020 0323  Gross per 24 hour   Intake 865 ml   Output 1100 ml   Net -235 ml     Weight (last 2 days)     None            Telemetry Review: No significant arrhythmias seen on telemetry review  EKG personally reviewed by Davis Aggarwal DO  Physical Exam:  Vital signs reviewed  General:  Alert and cooperative, appears stated age, no acute distress  HEENT:  PERRLA, EOMI, no scleral icterus, no conjunctival pallor  Neck:  No lymphadenopathy, no thyromegaly, no carotid bruits, no elevated JVP  Heart:  Regular rate and rhythm, normal S1/S2, no S3/S4, no murmur, rubs or gallops    PMI nondisplaced  Lungs:  Clear to auscultation bilaterally, no wheezes rales or rhonchi  Abdomen:  Soft, non-tender, positive bowel sounds, no rebound or guarding,   no organomegaly   Extremities:  Normal range of motion    No clubbing, cyanosis or edema   Vascular:  2+ pedal pulses  Skin:  No rashes or lesions on exposed skin  Neurologic:  Cranial nerves II-XII grossly intact without focal deficits          Laboratory Results:        CBC with diff:   Results from last 7 days   Lab Units 02/02/20  0624 02/01/20  0849 01/31/20  0438 01/30/20  0449 01/29/20  0634 01/28/20  0525 01/27/20  0606   WBC Thousand/uL 8 80 10 86* 13 85* 9 28 6 84 8 96 9 56   HEMOGLOBIN g/dL 9 9* 10 4* 10 6* 11 6 11 3* 11 7 12 0   HEMATOCRIT % 32 4* 34 9 35 9 38 2 37 9 38 0 39 4   MCV fL 86 86 87 86 87 86 86   PLATELETS Thousands/uL 254 252 298 360 309 317 319   MCH pg 26 4* 25 6* 25 7* 26 1* 25 8* 26 5* 26 2*   MCHC g/dL 30 6* 29 8* 29 5* 30 4* 29 8* 30 8* 30 5*   RDW % 15 9* 16 2* 16 1* 16 0* 15 9* 16 2* 16 1*   MPV fL 9 4 9 0 9 5 9 7 9 8 10 4 10 1   NRBC AUTO /100 WBCs 0 0 0 0 0  --  0         CMP:  Results from last 7 days   Lab Units 02/02/20  0505 02/01/20  0850 01/31/20  0825 01/31/20  0438 01/30/20  0449 01/29/20  0643 01/28/20  0525   POTASSIUM mmol/L 3 5 3 8 3 6 4 0 4 4 3 7 4 0   CHLORIDE mmol/L 106 108 105 106 109* 109* 109*   CO2 mmol/L 30 28 27 28 27 26 25   BUN mg/dL 5 3* 2* 2* 1* 1* 2*   CREATININE mg/dL 0 40* 0 44* 0 60 0 56* 0 53* 0 52* 0 49*   CALCIUM mg/dL 8 7 8 7 8 6 8 4 9 0 9 0 9 2   AST U/L 10  --  17  --   --   --   --    ALT U/L 18  --  30  --   --   --   --    ALK PHOS U/L 112  --  94  --   --   --   --    EGFR ml/min/1 73sq m 97 94 85 87 89 89 91         BMP:  Results from last 7 days   Lab Units 02/02/20  0505 02/01/20  0850 01/31/20  0825 01/31/20  0438 01/30/20  0449 01/29/20  0643 01/28/20  0525   POTASSIUM mmol/L 3 5 3 8 3 6 4 0 4 4 3 7 4 0   CHLORIDE mmol/L 106 108 105 106 109* 109* 109*   CO2 mmol/L 30 28 27 28 27 26 25   BUN mg/dL 5 3* 2* 2* 1* 1* 2*   CREATININE mg/dL 0 40* 0 44* 0 60 0 56* 0 53* 0 52* 0 49*   CALCIUM mg/dL 8 7 8 7 8 6 8 4 9 0 9 0 9 2       BNP: No results for input(s): BNP in the last 72 hours  Magnesium:   Results from last 7 days   Lab Units 20  0505 20  0825 20  0449 20  0643   MAGNESIUM mg/dL 2 2 1 8 2 2 1 7       Coags:       TSH:        Hemoglobin A1C       Lipid Profile:   Results from last 7 days   Lab Units 20  0825   TRIGLYCERIDES mg/dL 151*       Cardiac testing:   Results for orders placed during the hospital encounter of 10/06/19   Echo complete with contrast if indicated    [place] [address] [facility] [address]    Transthoracic Echocardiogram  2D, M-mode, Doppler, and Color Doppler    Study date:  07-Oct-2019    Patient: Alban Goldberg  MR number: [id number]  Account number: [de-identified]  : 1935  Age: 82 years  Gender: Female  Status: Inpatient  Location: [facility]   Height: 60 in  Weight: 164 lb  BP: 88/ 53 mmHg    Indications: Afib    Diagnoses: I48 0 - Atrial fibrillation    Sonographer:  Gabriele Poon, [facility]  Referring Physician:  Ulysses Milroy, MD  Group:  [facility]  Interpreting Physician:  Linwood Suarez MD    SUMMARY    LEFT VENTRICLE:  Systolic function was normal  Ejection fraction was estimated to be 60 %  There were no regional wall motion abnormalities  There was mild concentric hypertrophy  Doppler parameters were consistent with abnormal left ventricular relaxation (grade 1 diastolic dysfunction)  MITRAL VALVE:  There was mild annular calcification  There was trace regurgitation  AORTIC VALVE:  There was mild regurgitation  TRICUSPID VALVE:  There was trace regurgitation  HISTORY: PRIOR HISTORY: Congestive heart failure  Atrial fibrillation  Chronic lung disease  PROCEDURE: The study was performed in the [facility]   This was a routine study  The transthoracic approach was used  The study included complete 2D imaging, M-mode, complete spectral Doppler, and color Doppler  The  heart rate was 92 bpm, at the start of the study  Images were obtained from the parasternal, apical, subcostal, and suprasternal notch acoustic windows  Echocardiographic views were limited due to poor acoustic window availability,  decreased penetration, and lung interference  This was a technically difficult study  LEFT VENTRICLE: Size was normal  Systolic function was normal  Ejection fraction was estimated to be 60 %  There were no regional wall motion abnormalities  Wall thickness was mildly increased  There was mild concentric hypertrophy  DOPPLER: Doppler parameters were consistent with abnormal left ventricular relaxation (grade 1 diastolic dysfunction)  RIGHT VENTRICLE: The size was normal  Systolic function was normal  Wall thickness was normal     LEFT ATRIUM: Size was normal     RIGHT ATRIUM: Size was normal     MITRAL VALVE: There was mild annular calcification  Valve structure was normal  There was normal leaflet separation  DOPPLER: The transmitral velocity was within the normal range  There was no evidence for stenosis  There was trace  regurgitation  AORTIC VALVE: The valve was not well visualized  DOPPLER: Transaortic velocity was within the normal range  There was no evidence for stenosis  There was mild regurgitation  TRICUSPID VALVE: The valve structure was normal  There was normal leaflet separation  DOPPLER: The transtricuspid velocity was within the normal range  There was no evidence for stenosis  There was trace regurgitation  Pulmonary artery  systolic pressure was moderately increased  Estimated peak PA pressure was 65 mmHg  The findings suggest moderate pulmonary hypertension  PULMONIC VALVE: Not well visualized  DOPPLER: The transpulmonic velocity was within the normal range   There was no significant regurgitation  PERICARDIUM: There was no pericardial effusion  The pericardium was normal in appearance  AORTA: The root exhibited normal size  SYSTEMIC VEINS: IVC: The inferior vena cava was upper normal     SYSTEM MEASUREMENT TABLES    2D  %FS: 30 38 %  AV Diam: 3 11 cm  EDV(Teich): 61 91 ml  EF(Teich): 58 53 %  ESV(Teich): 25 68 ml  IVSd: 1 15 cm  LA Area: 16 7 cm2  LA Diam: 3 58 cm  LVEDV MOD A4C: 81 9 ml  LVEF MOD A4C: 56 03 %  LVESV MOD A4C: 36 01 ml  LVIDd: 3 8 cm  LVIDs: 2 65 cm  LVLd A4C: 6 83 cm  LVLs A4C: 5 86 cm  LVPWd: 1 12 cm  RA Area: 14 56 cm2  RV Diam : 3 27 cm  SV MOD A4C: 45 89 ml  SV(Cube): 36 32 ml  SV(Teich): 36 24 ml    CW  AR Dec Marshall: 1 94 m/s2  AR Dec Time: 1840 69 ms  AR PHT: 533 8 ms  AR Vmax: 3 58 m/s  AR maxP 22 mmHg  TR Vmax: 3 49 m/s  TR maxP 16 mmHg    MM  TAPSE: 2 16 cm    PW  E': 0 07 m/s  E/E': 13 57  MV A Rodrigue: 1 44 m/s  MV Dec Marshall: 2 9 m/s2  MV DecT: 337 71 ms  MV E Rodrigue: 0 98 m/s  MV E/A Ratio: 0 68    [facility] Accredited Echocardiography Laboratory    Prepared and electronically signed by    Lars Araujo MD  Signed 07-Oct-2019 21:51:59       No results found for this or any previous visit  No results found for this or any previous visit  No results found for this or any previous visit      Meds/Allergies   all current active meds have been reviewed  Medications Prior to Admission   Medication    albuterol (PROVENTIL HFA,VENTOLIN HFA) 90 mcg/act inhaler    alendronate (FOSAMAX) 70 mg tablet    ALPRAZolam (XANAX) 0 5 mg tablet    apixaban (ELIQUIS) 5 mg    Calcium Carb-Cholecalciferol (CALCIUM 1000 + D PO)    cholestyramine sugar free (QUESTRAN LIGHT) 4 g packet    diltiazem (CARDIZEM CD) 240 mg 24 hr capsule    fluticasone-umeclidinium-vilanterol (TRELEGY ELLIPTA) 100-62 5-25 MCG/INH inhaler    ipratropium-albuterol (DUO-NEB) 0 5-2 5 mg/3 mL nebulizer solution    iron polysaccharides (FERREX) 150 mg capsule    loperamide (IMODIUM) 2 mg capsule    metoprolol tartrate (LOPRESSOR) 25 mg tablet    montelukast (SINGULAIR) 10 mg tablet    pantoprazole (PROTONIX) 40 mg tablet    potassium chloride (MICRO-K) 10 MEQ CR capsule    psyllium (METAMUCIL) packet         Adult TPN (CUSTOM BASE/STANDARD ELECTROLYTE)     Adult TPN (STANDARD BASE/STANDARD ELECTROLYTE)  Last Rate: 48 6 mL/hr at 02/01/20 2109   diltiazem 1-15 mg/hr Last Rate: Stopped (01/30/20 2022)     Assessment:  Principal Problem:    Gastric outlet obstruction  Active Problems:    COPD (chronic obstructive pulmonary disease) ([facility])    Colorectal polyps    Chronic low back pain    Atrial fibrillation with rapid ventricular response ([facility])    Compression fracture of L4 vertebra ([facility])    Closed wedge compression fracture of T12 vertebra ([facility])

## 2020-02-03 ENCOUNTER — ANESTHESIA (INPATIENT)
Dept: PERIOP | Facility: HOSPITAL | Age: 85
DRG: 326 | End: 2020-02-03
Payer: MEDICARE

## 2020-02-03 LAB
ANION GAP SERPL CALCULATED.3IONS-SCNC: 6 MMOL/L (ref 4–13)
BASOPHILS # BLD AUTO: 0.01 THOUSANDS/ΜL (ref 0–0.1)
BASOPHILS NFR BLD AUTO: 0 % (ref 0–1)
BUN SERPL-MCNC: 7 MG/DL (ref 5–25)
CALCIUM SERPL-MCNC: 8.7 MG/DL (ref 8.3–10.1)
CHLORIDE SERPL-SCNC: 107 MMOL/L (ref 100–108)
CO2 SERPL-SCNC: 28 MMOL/L (ref 21–32)
CREAT SERPL-MCNC: 0.4 MG/DL (ref 0.6–1.3)
EOSINOPHIL # BLD AUTO: 0.1 THOUSAND/ΜL (ref 0–0.61)
EOSINOPHIL NFR BLD AUTO: 1 % (ref 0–6)
ERYTHROCYTE [DISTWIDTH] IN BLOOD BY AUTOMATED COUNT: 15.7 % (ref 11.6–15.1)
GFR SERPL CREATININE-BSD FRML MDRD: 97 ML/MIN/1.73SQ M
GLUCOSE SERPL-MCNC: 102 MG/DL (ref 65–140)
HCT VFR BLD AUTO: 34.7 % (ref 34.8–46.1)
HGB BLD-MCNC: 10.5 G/DL (ref 11.5–15.4)
IMM GRANULOCYTES # BLD AUTO: 0.07 THOUSAND/UL (ref 0–0.2)
IMM GRANULOCYTES NFR BLD AUTO: 1 % (ref 0–2)
LYMPHOCYTES # BLD AUTO: 1.15 THOUSANDS/ΜL (ref 0.6–4.47)
LYMPHOCYTES NFR BLD AUTO: 16 % (ref 14–44)
MAGNESIUM SERPL-MCNC: 2.3 MG/DL (ref 1.6–2.6)
MCH RBC QN AUTO: 25.7 PG (ref 26.8–34.3)
MCHC RBC AUTO-ENTMCNC: 30.3 G/DL (ref 31.4–37.4)
MCV RBC AUTO: 85 FL (ref 82–98)
MONOCYTES # BLD AUTO: 0.62 THOUSAND/ΜL (ref 0.17–1.22)
MONOCYTES NFR BLD AUTO: 9 % (ref 4–12)
NEUTROPHILS # BLD AUTO: 5.38 THOUSANDS/ΜL (ref 1.85–7.62)
NEUTS SEG NFR BLD AUTO: 73 % (ref 43–75)
NRBC BLD AUTO-RTO: 0 /100 WBCS
PHOSPHATE SERPL-MCNC: 4 MG/DL (ref 2.3–4.1)
PLATELET # BLD AUTO: 298 THOUSANDS/UL (ref 149–390)
PMV BLD AUTO: 9.7 FL (ref 8.9–12.7)
POTASSIUM SERPL-SCNC: 3.7 MMOL/L (ref 3.5–5.3)
RBC # BLD AUTO: 4.08 MILLION/UL (ref 3.81–5.12)
SODIUM SERPL-SCNC: 141 MMOL/L (ref 136–145)
WBC # BLD AUTO: 7.33 THOUSAND/UL (ref 4.31–10.16)

## 2020-02-03 PROCEDURE — 88307 TISSUE EXAM BY PATHOLOGIST: CPT | Performed by: PATHOLOGY

## 2020-02-03 PROCEDURE — 99232 SBSQ HOSP IP/OBS MODERATE 35: CPT | Performed by: INTERNAL MEDICINE

## 2020-02-03 PROCEDURE — 88341 IMHCHEM/IMCYTCHM EA ADD ANTB: CPT | Performed by: PATHOLOGY

## 2020-02-03 PROCEDURE — 99232 SBSQ HOSP IP/OBS MODERATE 35: CPT | Performed by: SURGERY

## 2020-02-03 PROCEDURE — 80048 BASIC METABOLIC PNL TOTAL CA: CPT | Performed by: ORTHOPAEDIC SURGERY

## 2020-02-03 PROCEDURE — C9113 INJ PANTOPRAZOLE SODIUM, VIA: HCPCS | Performed by: INTERNAL MEDICINE

## 2020-02-03 PROCEDURE — 84100 ASSAY OF PHOSPHORUS: CPT | Performed by: ORTHOPAEDIC SURGERY

## 2020-02-03 PROCEDURE — 0DN80ZZ RELEASE SMALL INTESTINE, OPEN APPROACH: ICD-10-PCS | Performed by: SURGERY

## 2020-02-03 PROCEDURE — 88342 IMHCHEM/IMCYTCHM 1ST ANTB: CPT | Performed by: PATHOLOGY

## 2020-02-03 PROCEDURE — 43632 REMOVAL OF STOMACH PARTIAL: CPT | Performed by: SURGERY

## 2020-02-03 PROCEDURE — 94640 AIRWAY INHALATION TREATMENT: CPT

## 2020-02-03 PROCEDURE — NC001 PR NO CHARGE: Performed by: SURGERY

## 2020-02-03 PROCEDURE — 0DNU0ZZ RELEASE OMENTUM, OPEN APPROACH: ICD-10-PCS | Performed by: SURGERY

## 2020-02-03 PROCEDURE — 0DB60ZZ EXCISION OF STOMACH, OPEN APPROACH: ICD-10-PCS | Performed by: SURGERY

## 2020-02-03 PROCEDURE — 0DQE0ZZ REPAIR LARGE INTESTINE, OPEN APPROACH: ICD-10-PCS | Performed by: SURGERY

## 2020-02-03 PROCEDURE — 83735 ASSAY OF MAGNESIUM: CPT | Performed by: ORTHOPAEDIC SURGERY

## 2020-02-03 PROCEDURE — 85025 COMPLETE CBC W/AUTO DIFF WBC: CPT | Performed by: ORTHOPAEDIC SURGERY

## 2020-02-03 PROCEDURE — 94760 N-INVAS EAR/PLS OXIMETRY 1: CPT

## 2020-02-03 RX ORDER — GLYCOPYRROLATE 0.2 MG/ML
INJECTION INTRAMUSCULAR; INTRAVENOUS AS NEEDED
Status: DISCONTINUED | OUTPATIENT
Start: 2020-02-03 | End: 2020-02-03 | Stop reason: SURG

## 2020-02-03 RX ORDER — HYDROMORPHONE HCL/PF 1 MG/ML
0.2 SYRINGE (ML) INJECTION EVERY 2 HOUR PRN
Status: DISCONTINUED | OUTPATIENT
Start: 2020-02-03 | End: 2020-02-04

## 2020-02-03 RX ORDER — DIPHENHYDRAMINE HYDROCHLORIDE 50 MG/ML
12.5 INJECTION INTRAMUSCULAR; INTRAVENOUS ONCE AS NEEDED
Status: DISCONTINUED | OUTPATIENT
Start: 2020-02-03 | End: 2020-02-03 | Stop reason: HOSPADM

## 2020-02-03 RX ORDER — PROPOFOL 10 MG/ML
INJECTION, EMULSION INTRAVENOUS AS NEEDED
Status: DISCONTINUED | OUTPATIENT
Start: 2020-02-03 | End: 2020-02-03 | Stop reason: SURG

## 2020-02-03 RX ORDER — SODIUM CHLORIDE, SODIUM LACTATE, POTASSIUM CHLORIDE, CALCIUM CHLORIDE 600; 310; 30; 20 MG/100ML; MG/100ML; MG/100ML; MG/100ML
100 INJECTION, SOLUTION INTRAVENOUS CONTINUOUS
Status: ACTIVE | OUTPATIENT
Start: 2020-02-03 | End: 2020-02-03

## 2020-02-03 RX ORDER — NEOSTIGMINE METHYLSULFATE 1 MG/ML
INJECTION INTRAVENOUS AS NEEDED
Status: DISCONTINUED | OUTPATIENT
Start: 2020-02-03 | End: 2020-02-03 | Stop reason: SURG

## 2020-02-03 RX ORDER — ROCURONIUM BROMIDE 10 MG/ML
INJECTION, SOLUTION INTRAVENOUS AS NEEDED
Status: DISCONTINUED | OUTPATIENT
Start: 2020-02-03 | End: 2020-02-03 | Stop reason: SURG

## 2020-02-03 RX ORDER — HYDROMORPHONE HCL/PF 1 MG/ML
0.5 SYRINGE (ML) INJECTION EVERY 2 HOUR PRN
Status: DISCONTINUED | OUTPATIENT
Start: 2020-02-03 | End: 2020-02-04

## 2020-02-03 RX ORDER — SODIUM CHLORIDE, SODIUM LACTATE, POTASSIUM CHLORIDE, CALCIUM CHLORIDE 600; 310; 30; 20 MG/100ML; MG/100ML; MG/100ML; MG/100ML
INJECTION, SOLUTION INTRAVENOUS CONTINUOUS PRN
Status: DISCONTINUED | OUTPATIENT
Start: 2020-02-03 | End: 2020-02-03 | Stop reason: SURG

## 2020-02-03 RX ORDER — FENTANYL CITRATE/PF 50 MCG/ML
50 SYRINGE (ML) INJECTION
Status: DISCONTINUED | OUTPATIENT
Start: 2020-02-03 | End: 2020-02-03 | Stop reason: HOSPADM

## 2020-02-03 RX ORDER — FENTANYL CITRATE 50 UG/ML
INJECTION, SOLUTION INTRAMUSCULAR; INTRAVENOUS AS NEEDED
Status: DISCONTINUED | OUTPATIENT
Start: 2020-02-03 | End: 2020-02-03 | Stop reason: SURG

## 2020-02-03 RX ORDER — SODIUM CHLORIDE 9 MG/ML
INJECTION, SOLUTION INTRAVENOUS CONTINUOUS PRN
Status: DISCONTINUED | OUTPATIENT
Start: 2020-02-03 | End: 2020-02-03

## 2020-02-03 RX ORDER — FENTANYL CITRATE/PF 50 MCG/ML
25 SYRINGE (ML) INJECTION
Status: DISCONTINUED | OUTPATIENT
Start: 2020-02-03 | End: 2020-02-03 | Stop reason: HOSPADM

## 2020-02-03 RX ORDER — HYDROMORPHONE HCL/PF 1 MG/ML
0.5 SYRINGE (ML) INJECTION
Status: DISCONTINUED | OUTPATIENT
Start: 2020-02-03 | End: 2020-02-03 | Stop reason: HOSPADM

## 2020-02-03 RX ORDER — ONDANSETRON 2 MG/ML
4 INJECTION INTRAMUSCULAR; INTRAVENOUS ONCE AS NEEDED
Status: DISCONTINUED | OUTPATIENT
Start: 2020-02-03 | End: 2020-02-03 | Stop reason: HOSPADM

## 2020-02-03 RX ORDER — LIDOCAINE HYDROCHLORIDE 10 MG/ML
INJECTION, SOLUTION EPIDURAL; INFILTRATION; INTRACAUDAL; PERINEURAL AS NEEDED
Status: DISCONTINUED | OUTPATIENT
Start: 2020-02-03 | End: 2020-02-03 | Stop reason: SURG

## 2020-02-03 RX ORDER — SUCCINYLCHOLINE/SOD CL,ISO/PF 100 MG/5ML
SYRINGE (ML) INTRAVENOUS AS NEEDED
Status: DISCONTINUED | OUTPATIENT
Start: 2020-02-03 | End: 2020-02-03 | Stop reason: SURG

## 2020-02-03 RX ORDER — ONDANSETRON 2 MG/ML
4 INJECTION INTRAMUSCULAR; INTRAVENOUS EVERY 4 HOURS PRN
Status: DISCONTINUED | OUTPATIENT
Start: 2020-02-03 | End: 2020-02-14 | Stop reason: HOSPADM

## 2020-02-03 RX ADMIN — NEOSTIGMINE METHYLSULFATE 3 MG: 1 INJECTION INTRAVENOUS at 21:21

## 2020-02-03 RX ADMIN — METOPROLOL TARTRATE 5 MG: 5 INJECTION, SOLUTION INTRAVENOUS at 05:16

## 2020-02-03 RX ADMIN — LEVALBUTEROL HYDROCHLORIDE 1.25 MG: 1.25 SOLUTION, CONCENTRATE RESPIRATORY (INHALATION) at 07:11

## 2020-02-03 RX ADMIN — BACITRACIN ZINC, NEOMYCIN SULFATE, AND POLYMYXIN B SULFATE 1 SMALL APPLICATION: 400; 3.5; 5 OINTMENT TOPICAL at 08:16

## 2020-02-03 RX ADMIN — HYDROMORPHONE HYDROCHLORIDE 0.2 MG: 1 INJECTION, SOLUTION INTRAMUSCULAR; INTRAVENOUS; SUBCUTANEOUS at 08:16

## 2020-02-03 RX ADMIN — METOPROLOL TARTRATE 5 MG: 5 INJECTION, SOLUTION INTRAVENOUS at 23:00

## 2020-02-03 RX ADMIN — CEFAZOLIN SODIUM 1000 MG: 1 SOLUTION INTRAVENOUS at 19:20

## 2020-02-03 RX ADMIN — PHENYLEPHRINE HYDROCHLORIDE 200 MCG: 10 INJECTION INTRAVENOUS at 19:19

## 2020-02-03 RX ADMIN — PROPOFOL 100 MG: 10 INJECTION, EMULSION INTRAVENOUS at 19:14

## 2020-02-03 RX ADMIN — LEVALBUTEROL HYDROCHLORIDE 1.25 MG: 1.25 SOLUTION, CONCENTRATE RESPIRATORY (INHALATION) at 13:17

## 2020-02-03 RX ADMIN — ALTEPLASE 2 MG: 2.2 INJECTION, POWDER, LYOPHILIZED, FOR SOLUTION INTRAVENOUS at 09:12

## 2020-02-03 RX ADMIN — DEXTRAN 70 AND HYPROMELLOSE 2910 1 DROP: 1; 3 SOLUTION/ DROPS OPHTHALMIC at 17:00

## 2020-02-03 RX ADMIN — GLYCOPYRROLATE 0.4 MG: 0.2 INJECTION, SOLUTION INTRAMUSCULAR; INTRAVENOUS at 21:21

## 2020-02-03 RX ADMIN — HYDROMORPHONE HYDROCHLORIDE 0.2 MG: 1 INJECTION, SOLUTION INTRAMUSCULAR; INTRAVENOUS; SUBCUTANEOUS at 14:56

## 2020-02-03 RX ADMIN — ONDANSETRON 4 MG: 2 INJECTION INTRAMUSCULAR; INTRAVENOUS at 19:50

## 2020-02-03 RX ADMIN — PHENYLEPHRINE HYDROCHLORIDE 200 MCG: 10 INJECTION INTRAVENOUS at 19:33

## 2020-02-03 RX ADMIN — METOPROLOL TARTRATE 5 MG: 5 INJECTION, SOLUTION INTRAVENOUS at 11:56

## 2020-02-03 RX ADMIN — PANTOPRAZOLE SODIUM 40 MG: 40 INJECTION, POWDER, FOR SOLUTION INTRAVENOUS at 08:16

## 2020-02-03 RX ADMIN — METOPROLOL TARTRATE 5 MG: 5 INJECTION, SOLUTION INTRAVENOUS at 17:25

## 2020-02-03 RX ADMIN — FENTANYL CITRATE 50 MCG: 50 INJECTION, SOLUTION INTRAMUSCULAR; INTRAVENOUS at 19:14

## 2020-02-03 RX ADMIN — PHENYLEPHRINE HYDROCHLORIDE 20 MCG/MIN: 10 INJECTION INTRAVENOUS at 19:22

## 2020-02-03 RX ADMIN — SODIUM CHLORIDE, SODIUM LACTATE, POTASSIUM CHLORIDE, AND CALCIUM CHLORIDE: .6; .31; .03; .02 INJECTION, SOLUTION INTRAVENOUS at 19:06

## 2020-02-03 RX ADMIN — Medication 5 SPRAY: at 11:55

## 2020-02-03 RX ADMIN — ALTEPLASE 2 MG: 2.2 INJECTION, POWDER, LYOPHILIZED, FOR SOLUTION INTRAVENOUS at 12:03

## 2020-02-03 RX ADMIN — DEXTRAN 70 AND HYPROMELLOSE 2910 1 DROP: 1; 3 SOLUTION/ DROPS OPHTHALMIC at 08:01

## 2020-02-03 RX ADMIN — ROCURONIUM BROMIDE 20 MG: 50 INJECTION, SOLUTION INTRAVENOUS at 20:02

## 2020-02-03 RX ADMIN — CALCIUM GLUCONATE: 98 INJECTION, SOLUTION INTRAVENOUS at 22:54

## 2020-02-03 RX ADMIN — Medication 100 MG: at 19:27

## 2020-02-03 RX ADMIN — HEPARIN SODIUM 5000 UNITS: 5000 INJECTION INTRAVENOUS; SUBCUTANEOUS at 13:15

## 2020-02-03 RX ADMIN — IPRATROPIUM BROMIDE 0.5 MG: 0.5 SOLUTION RESPIRATORY (INHALATION) at 07:10

## 2020-02-03 RX ADMIN — HEPARIN SODIUM 5000 UNITS: 5000 INJECTION INTRAVENOUS; SUBCUTANEOUS at 05:14

## 2020-02-03 RX ADMIN — ROCURONIUM BROMIDE 20 MG: 50 INJECTION, SOLUTION INTRAVENOUS at 19:27

## 2020-02-03 RX ADMIN — IPRATROPIUM BROMIDE 0.5 MG: 0.5 SOLUTION RESPIRATORY (INHALATION) at 13:17

## 2020-02-03 RX ADMIN — FENTANYL CITRATE 50 MCG: 50 INJECTION, SOLUTION INTRAMUSCULAR; INTRAVENOUS at 21:26

## 2020-02-03 RX ADMIN — CEFAZOLIN SODIUM 1000 MG: 1 SOLUTION INTRAVENOUS at 08:17

## 2020-02-03 RX ADMIN — LIDOCAINE HYDROCHLORIDE 50 MG: 10 INJECTION, SOLUTION EPIDURAL; INFILTRATION; INTRACAUDAL; PERINEURAL at 19:14

## 2020-02-03 NOTE — PROGRESS NOTES
Progress Note - General Surgery   Sharri Caicedo 82 y o  female MRN: [id number]  Unit/Bed#: [facility] 802-01 Encounter: [id number]    Assessment:  Patient is a 82 y o  female who presented with gastric outlet obstruction, now s/p multiple EGDs may require surgery to bypass obstruction  Plan:  NPO  Reorder TPN  Continue to monitor electrolytes and replete as needed  F/u pathology  OR for gastrojejunostomy today    Subjective/Objective     Subjective:   No acute events overnight  Pt overall uncomfortable  Objective:    Blood pressure 129/79, pulse 87, temperature 97 7 °F (36 5 °C), resp  rate 18, height 5' (1 524 m), weight 64 7 kg (142 lb 11 2 oz), SpO2 95 %, not currently breastfeeding  ,Body mass index is 27 87 kg/m²  Intake/Output Summary (Last 24 hours) at 2/3/2020 0843  Last data filed at 2/3/2020 0646  Gross per 24 hour   Intake 1046 52 ml   Output 2350 ml   Net -1303 48 ml       Invasive Devices     Peripherally Inserted Central Catheter Line            PICC Line 02/01/20 Right Brachial 1 day                Physical Exam:   Gen:  Well-developed, well-nourished female in NAD  CV:Afib  Lungs: Normal respiratory effort   Abd: soft, nontender, mildly distended  Neuro:  AxO x3,       Results from last 7 days   Lab Units 02/02/20  0624 02/01/20  0849 01/31/20  0438   WBC Thousand/uL 8 80 10 86* 13 85*   HEMOGLOBIN g/dL 9 9* 10 4* 10 6*   HEMATOCRIT % 32 4* 34 9 35 9   PLATELETS Thousands/uL 254 252 298     Results from last 7 days   Lab Units 02/02/20  0505 02/01/20  0850 01/31/20  0825   POTASSIUM mmol/L 3 5 3 8 3 6   CHLORIDE mmol/L 106 108 105   CO2 mmol/L 30 28 27   BUN mg/dL 5 3* 2*   CREATININE mg/dL 0 40* 0 44* 0 60   CALCIUM mg/dL 8 7 8 7 8 6

## 2020-02-03 NOTE — ANESTHESIA PREPROCEDURE EVALUATION
hgb 10 5  2LNC      Review of Systems/Medical History  Patient summary reviewed  Chart reviewed  No history of anesthetic complications     Cardiovascular  EKG reviewed, Exercise tolerance (METS): >4,  Dysrhythmias , atrial fibrillation, CHF , NYHA Classification: I compensated CHF,   Comment: Case canceled twice for afib with RVR, poorly controlled,  Pulmonary  Smoker ex-smoker  , COPD , Asthma ,        GI/Hepatic    GERD ,   Comment: Gastric outlet obstruction     Negative  ROS No chronic kidney disease ,        Endo/Other  Negative endo/other ROS      GYN  Negative gynecology ROS          Hematology  Negative hematology ROS      Musculoskeletal  Back pain , thoracic pain and lumbar pain, Sciatica, Gout,   Arthritis     Neurology  Negative neurology ROS      Psychology   Anxiety,          TTE 10/2019:  LEFT VENTRICLE: Size was normal  Systolic function was normal  Ejection fraction was estimated to be 60 %  There were no regional wall motion abnormalities  Wall thickness was mildly increased  There was mild concentric hypertrophy  DOPPLER: Doppler parameters were consistent with abnormal left ventricular relaxation (grade 1 diastolic dysfunction)  RIGHT VENTRICLE: The size was normal  Systolic function was normal  Wall thickness was normal     LEFT ATRIUM: Size was normal     RIGHT ATRIUM: Size was normal     MITRAL VALVE: There was mild annular calcification  Valve structure was normal  There was normal leaflet separation  DOPPLER: The transmitral velocity was within the normal range  There was no evidence for stenosis  There was trace  regurgitation  AORTIC VALVE: The valve was not well visualized  DOPPLER: Transaortic velocity was within the normal range  There was no evidence for stenosis  There was mild regurgitation  TRICUSPID VALVE: The valve structure was normal  There was normal leaflet separation  DOPPLER: The transtricuspid velocity was within the normal range   There was no evidence for stenosis  There was trace regurgitation  Pulmonary artery  systolic pressure was moderately increased  Estimated peak PA pressure was 65 mmHg  The findings suggest moderate pulmonary hypertension  PULMONIC VALVE: Not well visualized  DOPPLER: The transpulmonic velocity was within the normal range  There was no significant regurgitation  Physical Exam    Airway    Mallampati score: II  TM Distance: >3 FB  Neck ROM: full     Dental   upper dentures and lower dentures,     Cardiovascular  Rhythm: irregular, Rate: normal,     Pulmonary  Breath sounds clear to auscultation,     Other Findings        Anesthesia Plan  ASA Score- 3     Anesthesia Type- general with ASA Monitors  Additional Monitors:   Airway Plan: ETT  Plan Factors-  Patient did not smoke on day of surgery  Induction- intravenous and rapid sequence induction  Postoperative Plan- Plan for postoperative opioid use  Informed Consent- Anesthetic plan and risks discussed with patient  I personally reviewed this patient with the CRNA  Discussed and agreed on the Anesthesia Plan with the CRNA  Doug Britton

## 2020-02-03 NOTE — PROGRESS NOTES
Nurse / Provider rounds completed with patient and staff nurseSuellen  Will  Take off Stepdown but keep telemetry for now  Possible OR today

## 2020-02-03 NOTE — PROGRESS NOTES
General Cardiology Progress Note   Andrés Castro 82 y o  female MRN: [id number]  Unit/Bed#: [facility] 802-01 Encounter: [id number]      Assessment:  Principal Problem:    Gastric outlet obstruction  Active Problems:    COPD (chronic obstructive pulmonary disease) (HCC)    Colorectal polyps    Chronic low back pain    Atrial fibrillation with rapid ventricular response (HCC)    Compression fracture of L4 vertebra (HCC)    Closed wedge compression fracture of T12 vertebra (HCC)      Impression:     PAF with RVR  o Remains in NSR  o AC decision post EGD/intervention   Gastric outlet obstruction  o Awaiting EGD today    Plan:     Following EGD, and depending on procedure results, future decisions for anticoagulation to be made considering her elevated chads Vasc risk   Remains in sinus rhythm   No medication changes at this time  Subjective:   Patient seen and examined  Patient offers me no current complaint, just a little frustrated that she had not had her EGD yet, I saw her at approximately 4:30 p m  Navin Pickens She states she still anticipates going for EGD today  ROS    Objective   Vitals: Blood pressure 113/68, pulse 83, temperature 97 7 °F (36 5 °C), resp  rate (!) 28, height 5' (1 524 m), weight 64 7 kg (142 lb 11 2 oz), SpO2 95 %, not currently breastfeeding , Body mass index is 27 87 kg/m² , I/O last 3 completed shifts: In: 1046 5 [TPN:1046 5]  Out: 3102 [FJKKE:1559]  I/O this shift: In: 50 [IV Piggyback:50]  Out: 1400 [Urine:1400]  Wt Readings from Last 3 Encounters:   01/30/20 64 7 kg (142 lb 11 2 oz)   01/21/20 68 9 kg (151 lb 14 4 oz)   01/08/20 67 kg (147 lb 9 6 oz)       Intake/Output Summary (Last 24 hours) at 2/3/2020 1703  Last data filed at 2/3/2020 1421  Gross per 24 hour   Intake 1096 52 ml   Output 3500 ml   Net -2403 48 ml     I/O last 3 completed shifts: In: 1046 5 [TPN:1046 5]  Out: 0333 [Urine:3150]    No significant arrhythmias seen on telemetry review       Physical Exam Constitutional: She is oriented to person, place, and time  No distress  HENT:   Head: Normocephalic  Eyes: Conjunctivae are normal    Neck: Normal range of motion  Neck supple  No JVD present  Cardiovascular: Normal rate, regular rhythm, normal heart sounds and intact distal pulses  Exam reveals no gallop  No murmur heard  Pulmonary/Chest: Effort normal and breath sounds normal  No respiratory distress  She has no wheezes  She has no rales  Abdominal: Soft  Bowel sounds are normal  She exhibits no distension  There is no tenderness  Musculoskeletal: Normal range of motion  She exhibits no edema  Neurological: She is alert and oriented to person, place, and time  Skin: Skin is warm and dry  She is not diaphoretic         Meds/Allergies   Allergies   Allergen Reactions    Fluticasone        Current Facility-Administered Medications:     acetaminophen (TYLENOL) oral suspension 650 mg, 650 mg, Oral, Q4H PRN, Jodie Chaput, DO, 650 mg at 01/30/20 1001    Adult 3-in-1 TPN (custom base / standard electrolytes), , Intravenous, Continuous, Devora Little MD, Last Rate: 69 5 mL/hr at 02/02/20 2119    Adult 3-in-1 TPN (custom base / standard electrolytes), , Intravenous, Continuous, Maria Isabel Bermuen MD    albuterol inhalation solution 2 5 mg, 2 5 mg, Nebulization, Q4H PRN, Aurora Doom, DO, 2 5 mg at 01/29/20 0128    bisacodyl (DULCOLAX) rectal suppository 10 mg, 10 mg, Rectal, Daily, Jodie Mcclellan, DO, 10 mg at 02/02/20 0754    ceFAZolin (ANCEF) IVPB (premix) 1,000 mg, 1,000 mg, Intravenous, Q12H, Nguyen Orantes MD, Stopped at 02/03/20 0912    dextran 70-hypromellose (GENTEAL TEARS) 0 1-0 3 % ophthalmic solution 1 drop, 1 drop, Both Eyes, TID, Jodie Mcclellan DO, 1 drop at 02/03/20 0801    diltiazem (CARDIZEM) 125 mg in sodium chloride 0 9 % 125 mL infusion, 1-15 mg/hr, Intravenous, Continuous, Jodie Mcclellan DO, Stopped at 01/30/20 2022    heparin (porcine) subcutaneous injection 5,000 Units, 5,000 Units, Subcutaneous, Q8H [address], Jodie Mcclellan, DO, 5,000 Units at 02/03/20 1315    HYDROmorphone (DILAUDID) injection 0 2 mg, 0 2 mg, Intravenous, Q3H PRN, Janice Mcclellan, DO, 0 2 mg at 02/03/20 1456    HYDROmorphone (DILAUDID) injection 0 5 mg, 0 5 mg, Intravenous, Q3H PRN, Jaiden Damico, DO, 0 5 mg at 01/24/20 0415    ipratropium (ATROVENT) 0 02 % inhalation solution 0 5 mg, 0 5 mg, Nebulization, TID, Jaiden Damico DO, 0 5 mg at 02/03/20 1317    levalbuterol (XOPENEX) inhalation solution 1 25 mg, 1 25 mg, Nebulization, TID, Jodie Mcclellan, DO, 1 25 mg at 02/03/20 1317    LORazepam (ATIVAN) 2 mg/mL injection 0 25 mg, 0 25 mg, Intravenous, HS PRN, Jaiden Damico DO, Stopped at 01/28/20 2139    metoprolol (LOPRESSOR) injection 5 mg, 5 mg, Intravenous, Q6H PRN, Janice Mcclellan, DO, 5 mg at 01/29/20 1607    metoprolol (LOPRESSOR) injection 5 mg, 5 mg, Intravenous, Q6H, Jodie Mcclellan, DO, 5 mg at 02/03/20 1156    neomycin-bacitracin-polymyxin b (NEOSPORIN) ointment 1 small application, 1 small application, Topical, Daily, Jodie Mcclellan, DO, 1 small application at 38/83/49 0816    ondansetron (ZOFRAN) injection 4 mg, 4 mg, Intravenous, Q6H PRN, Janice Mcclellan, DO, 4 mg at 02/02/20 1343    pantoprazole (PROTONIX) injection 40 mg, 40 mg, Intravenous, Q12H [address], Jodie Mcclellan, DO, 40 mg at 02/03/20 0816    phenol (CHLORASEPTIC) 1 4 % mucosal liquid 1 spray, 1 spray, Mouth/Throat, Q2H PRN, Jodie Chaput, DO, 1 spray at 02/02/20 1154    saliva substitute (MOUTH KOTE) mucosal solution 5 spray, 5 spray, Mouth/Throat, 4x Daily PRN, Janice [place] Chaput, DO, 5 spray at 02/03/20 1155    simethicone (MYLICON) chewable tablet 80 mg, 80 mg, Oral, Q6H PRN, Bryson Archer MD, 80 mg at 01/31/20 1608    sodium chloride (OCEAN) 0 65 % nasal spray 1 spray, 1 spray, Each Nare, Q1H PRN, Justin Lubin, DO, 1 spray at 02/02/20 1153    Laboratory Results:        CBC with diff:   Results from last 7 days   Lab Units 02/03/20  1313 02/02/20  0624 02/01/20  0849 01/31/20  0438 01/30/20  0449 01/29/20  0634 01/28/20  0525   WBC Thousand/uL 7 33 8 80 10 86* 13 85* 9 28 6 84 8 96   HEMOGLOBIN g/dL 10 5* 9 9* 10 4* 10 6* 11 6 11 3* 11 7   HEMATOCRIT % 34 7* 32 4* 34 9 35 9 38 2 37 9 38 0   MCV fL 85 86 86 87 86 87 86   PLATELETS Thousands/uL 298 254 252 298 360 309 317   MCH pg 25 7* 26 4* 25 6* 25 7* 26 1* 25 8* 26 5*   MCHC g/dL 30 3* 30 6* 29 8* 29 5* 30 4* 29 8* 30 8*   RDW % 15 7* 15 9* 16 2* 16 1* 16 0* 15 9* 16 2*   MPV fL 9 7 9 4 9 0 9 5 9 7 9 8 10 4   NRBC AUTO /100 WBCs 0 0 0 0 0 0  --        CMP:  Results from last 7 days   Lab Units 02/03/20  1313 02/02/20  0505 02/01/20  0850 01/31/20  0825 01/31/20  0438 01/30/20  0449 01/29/20  0643   POTASSIUM mmol/L 3 7 3 5 3 8 3 6 4 0 4 4 3 7   CHLORIDE mmol/L 107 106 108 105 106 109* 109*   CO2 mmol/L 28 30 28 27 28 27 26   BUN mg/dL 7 5 3* 2* 2* 1* 1*   CREATININE mg/dL 0 40* 0 40* 0 44* 0 60 0 56* 0 53* 0 52*   CALCIUM mg/dL 8 7 8 7 8 7 8 6 8 4 9 0 9 0   AST U/L  --  10  --  17  --   --   --    ALT U/L  --  18  --  30  --   --   --    ALK PHOS U/L  --  112  --  94  --   --   --    EGFR ml/min/1 73sq m 97 97 94 85 87 89 89       BMP:  Results from last 7 days   Lab Units 02/03/20  1313 02/02/20  0505 02/01/20  0850 01/31/20  0825 01/31/20  0438 01/30/20  0449 01/29/20  0643   POTASSIUM mmol/L 3 7 3 5 3 8 3 6 4 0 4 4 3 7   CHLORIDE mmol/L 107 106 108 105 106 109* 109*   CO2 mmol/L 28 30 28 27 28 27 26   BUN mg/dL 7 5 3* 2* 2* 1* 1*   CREATININE mg/dL 0 40* 0 40* 0 44* 0 60 0 56* 0 53* 0 52*   CALCIUM mg/dL 8 7 8 7 8 7 8 6 8 4 9 0 9 0       NT-proBNP: No results for input(s): NTBNP in the last 72 hours       Magnesium:   Results from last 7 days   Lab Units 02/03/20  1313 02/02/20  0505 01/31/20  0825 01/30/20  0449 01/29/20  0643   MAGNESIUM mg/dL 2 3 2 2 1 8 2 2 1 7       Coags:       TSH:        Hemoglobin A1C )      Lipid Profile:   Lab Results   Component Value Date    CHOL 229 2016     Lab Results   Component Value Date    HDL 42 2016     Lab Results   Component Value Date    LDLCALC 171 (H) 2016     No results found for: LDLDIRECT  Lab Results   Component Value Date    TRIG 151 (H) 2020    TRIG 79 2016       Cardiac testing:   EKG personally reviewed by Marisel Espino MD      Results for orders placed during the hospital encounter of 10/06/19   Echo complete with contrast if indicated    Narrative [address] [place] [address]    Transthoracic Echocardiogram  2D, M-mode, Doppler, and Color Doppler    Study date:  07-Oct-2019    Patient: Mellisa Briscoe  MR number: [id number]  Account number: [de-identified]  : [id number]  Age: 82 years  Gender: Female  Status: Inpatient  Location: [facility]   Height: 60 in  Weight: 164 lb  BP: 88/ 53 mmHg    Indications: Afib    Diagnoses: I48 0 - Atrial fibrillation    Sonographer:  Unruly Diaz RDCS  Referring Physician:  Pablo Osborne MD  Group:  [facility]  Interpreting Physician:  Min Cid MD    SUMMARY    LEFT VENTRICLE:  Systolic function was normal  Ejection fraction was estimated to be 60 %  There were no regional wall motion abnormalities  There was mild concentric hypertrophy  Doppler parameters were consistent with abnormal left ventricular relaxation (grade 1 diastolic dysfunction)  MITRAL VALVE:  There was mild annular calcification  There was trace regurgitation  AORTIC VALVE:  There was mild regurgitation  TRICUSPID VALVE:  There was trace regurgitation  HISTORY: PRIOR HISTORY: Congestive heart failure  Atrial fibrillation  Chronic lung disease  PROCEDURE: The study was performed in the [facility]  This was a routine study  The transthoracic approach was used   The study included complete 2D imaging, M-mode, complete spectral Doppler, and color Doppler  The  heart rate was 92 bpm, at the start of the study  Images were obtained from the parasternal, apical, subcostal, and suprasternal notch acoustic windows  Echocardiographic views were limited due to poor acoustic window availability,  decreased penetration, and lung interference  This was a technically difficult study  LEFT VENTRICLE: Size was normal  Systolic function was normal  Ejection fraction was estimated to be 60 %  There were no regional wall motion abnormalities  Wall thickness was mildly increased  There was mild concentric hypertrophy  DOPPLER: Doppler parameters were consistent with abnormal left ventricular relaxation (grade 1 diastolic dysfunction)  RIGHT VENTRICLE: The size was normal  Systolic function was normal  Wall thickness was normal     LEFT ATRIUM: Size was normal     RIGHT ATRIUM: Size was normal     MITRAL VALVE: There was mild annular calcification  Valve structure was normal  There was normal leaflet separation  DOPPLER: The transmitral velocity was within the normal range  There was no evidence for stenosis  There was trace  regurgitation  AORTIC VALVE: The valve was not well visualized  DOPPLER: Transaortic velocity was within the normal range  There was no evidence for stenosis  There was mild regurgitation  TRICUSPID VALVE: The valve structure was normal  There was normal leaflet separation  DOPPLER: The transtricuspid velocity was within the normal range  There was no evidence for stenosis  There was trace regurgitation  Pulmonary artery  systolic pressure was moderately increased  Estimated peak PA pressure was 65 mmHg  The findings suggest moderate pulmonary hypertension  PULMONIC VALVE: Not well visualized  DOPPLER: The transpulmonic velocity was within the normal range  There was no significant regurgitation  PERICARDIUM: There was no pericardial effusion  The pericardium was normal in appearance      AORTA: The root exhibited normal size     SYSTEMIC VEINS: IVC: The inferior vena cava was upper normal     SYSTEM MEASUREMENT TABLES    2D  %FS: 30 38 %  AV Diam: 3 11 cm  EDV(Teich): 61 91 ml  EF(Teich): 58 53 %  ESV(Teich): 25 68 ml  IVSd: 1 15 cm  LA Area: 16 7 cm2  LA Diam: 3 58 cm  LVEDV MOD A4C: 81 9 ml  LVEF MOD A4C: 56 03 %  LVESV MOD A4C: 36 01 ml  LVIDd: 3 8 cm  LVIDs: 2 65 cm  LVLd A4C: 6 83 cm  LVLs A4C: 5 86 cm  LVPWd: 1 12 cm  RA Area: 14 56 cm2  RV Diam : 3 27 cm  SV MOD A4C: 45 89 ml  SV(Cube): 36 32 ml  SV(Teich): 36 24 ml    CW  AR Dec [place]: 1 94 m/s2  AR Dec Time: 1840 69 ms  AR PHT: 533 8 ms  AR Vmax: 3 58 m/s  AR maxP 22 mmHg  TR Vmax: 3 49 m/s  TR maxP 16 mmHg    MM  TAPSE: 2 16 cm    PW  E': 0 07 m/s  E/E': 13 57  MV A Rodrigue: 1 44 m/s  MV Dec [place]: 2 9 m/s2  MV DecT: 337 71 ms  MV E Rodrigue: 0 98 m/s  MV E/A Ratio: 0 68    [employer] Accredited Echocardiography Laboratory    Prepared and electronically signed by    Juanis Berger MD  Signed 07-Oct-2019 21:51:59       No results found for this or any previous visit  No results found for this or any previous visit  No results found for this or any previous visit  No results found for this or any previous visit  No results found for this or any previous visit  Anna Tavarez MD    Portions of the record may have been created with voice recognition software  Occasional wrong word or "sound a like" substitutions may have occurred due to the inherent limitations of voice recognition software  Read the chart carefully and recognize, using context, where substitutions have occurred

## 2020-02-03 NOTE — PROGRESS NOTES
Progress Note - Oncology Surgery   Maru José 82 y o  female MRN: [id number]  Unit/Bed#: [facility] 802-01 Encounter: [id number]    Assessment:  82 y o  female with a gastric outlet obstruction secondary to unknown etiology - possible Malignancy vs peptic stricture based on EGD from 1/30     Plan:  Continue NPO  Continue PICC and TPN re-order by primary  Plan for OR today with Acute Care Surgery for Gastrojejunostomy  Surgical Oncology on stand by  F/u Pathology - not yielded    Subjective/Objective   Chief Complaint:     Subjective: No acute events overnight  Waiting for procedure, appropriately nervous  Objective:     Blood pressure 122/79, pulse 73, temperature 97 9 °F (36 6 °C), resp  rate 20, height 5' (1 524 m), weight 64 7 kg (142 lb 11 2 oz), SpO2 97 %, not currently breastfeeding  ,Body mass index is 27 87 kg/m²  Intake/Output Summary (Last 24 hours) at 2/3/2020 0615  Last data filed at 2/3/2020 9420  Gross per 24 hour   Intake 1046 52 ml   Output 2150 ml   Net -1103 48 ml       Invasive Devices     Peripherally Inserted Central Catheter Line            PICC Line 02/01/20 Right Brachial 1 day                Physical Exam: General: AAOx3  Head: normocephalic, atraumatic  Neck: supple, trachea midline  Respiratory: BS b/l  Abdomen: Soft, NT, no rebound/guarding  Heart: RRR, S1s2  Ext: Warm no cyanosis   Pulse: 2+ radial      Lab, Imaging and other studies:  I have personally reviewed pertinent lab results    , CBC:   Lab Results   Component Value Date    WBC 8 80 02/02/2020    HGB 9 9 (L) 02/02/2020    HCT 32 4 (L) 02/02/2020    MCV 86 02/02/2020     02/02/2020    MCH 26 4 (L) 02/02/2020    MCHC 30 6 (L) 02/02/2020    RDW 15 9 (H) 02/02/2020    MPV 9 4 02/02/2020    NRBC 0 02/02/2020   , CMP: No results found for: SODIUM, K, CL, CO2, ANIONGAP, BUN, CREATININE, GLUCOSE, CALCIUM, AST, ALT, ALKPHOS, PROT, BILITOT, EGFR  VTE Pharmacologic Prophylaxis: Heparin  VTE Mechanical Prophylaxis: sequential compression device

## 2020-02-04 LAB
ANION GAP SERPL CALCULATED.3IONS-SCNC: 5 MMOL/L (ref 4–13)
BUN SERPL-MCNC: 8 MG/DL (ref 5–25)
CALCIUM SERPL-MCNC: 7.3 MG/DL (ref 8.3–10.1)
CHLORIDE SERPL-SCNC: 109 MMOL/L (ref 100–108)
CO2 SERPL-SCNC: 23 MMOL/L (ref 21–32)
CREAT SERPL-MCNC: 0.43 MG/DL (ref 0.6–1.3)
GFR SERPL CREATININE-BSD FRML MDRD: 95 ML/MIN/1.73SQ M
GLUCOSE SERPL-MCNC: 211 MG/DL (ref 65–140)
MAGNESIUM SERPL-MCNC: 1.7 MG/DL (ref 1.6–2.6)
PHOSPHATE SERPL-MCNC: 2.5 MG/DL (ref 2.3–4.1)
POTASSIUM SERPL-SCNC: 3.2 MMOL/L (ref 3.5–5.3)
SODIUM SERPL-SCNC: 137 MMOL/L (ref 136–145)

## 2020-02-04 PROCEDURE — 94760 N-INVAS EAR/PLS OXIMETRY 1: CPT

## 2020-02-04 PROCEDURE — 94640 AIRWAY INHALATION TREATMENT: CPT

## 2020-02-04 PROCEDURE — 80048 BASIC METABOLIC PNL TOTAL CA: CPT | Performed by: SURGERY

## 2020-02-04 PROCEDURE — 99232 SBSQ HOSP IP/OBS MODERATE 35: CPT | Performed by: INTERNAL MEDICINE

## 2020-02-04 PROCEDURE — C9113 INJ PANTOPRAZOLE SODIUM, VIA: HCPCS | Performed by: SURGERY

## 2020-02-04 PROCEDURE — 83735 ASSAY OF MAGNESIUM: CPT | Performed by: SURGERY

## 2020-02-04 PROCEDURE — 84100 ASSAY OF PHOSPHORUS: CPT | Performed by: SURGERY

## 2020-02-04 PROCEDURE — 99024 POSTOP FOLLOW-UP VISIT: CPT | Performed by: SURGERY

## 2020-02-04 RX ORDER — POTASSIUM CHLORIDE 29.8 MG/ML
40 INJECTION INTRAVENOUS ONCE
Status: COMPLETED | OUTPATIENT
Start: 2020-02-04 | End: 2020-02-05

## 2020-02-04 RX ORDER — DIAZEPAM 5 MG/ML
2.5 INJECTION, SOLUTION INTRAMUSCULAR; INTRAVENOUS EVERY 8 HOURS PRN
Status: DISCONTINUED | OUTPATIENT
Start: 2020-02-04 | End: 2020-02-14 | Stop reason: HOSPADM

## 2020-02-04 RX ORDER — MAGNESIUM SULFATE HEPTAHYDRATE 40 MG/ML
2 INJECTION, SOLUTION INTRAVENOUS ONCE
Status: COMPLETED | OUTPATIENT
Start: 2020-02-04 | End: 2020-02-04

## 2020-02-04 RX ORDER — SODIUM CHLORIDE, SODIUM LACTATE, POTASSIUM CHLORIDE, CALCIUM CHLORIDE 600; 310; 30; 20 MG/100ML; MG/100ML; MG/100ML; MG/100ML
20 INJECTION, SOLUTION INTRAVENOUS CONTINUOUS
Status: DISCONTINUED | OUTPATIENT
Start: 2020-02-04 | End: 2020-02-05

## 2020-02-04 RX ADMIN — HYDROMORPHONE HYDROCHLORIDE 0.2 MG: 1 INJECTION, SOLUTION INTRAMUSCULAR; INTRAVENOUS; SUBCUTANEOUS at 04:23

## 2020-02-04 RX ADMIN — METOPROLOL TARTRATE 5 MG: 5 INJECTION, SOLUTION INTRAVENOUS at 23:59

## 2020-02-04 RX ADMIN — METOPROLOL TARTRATE 5 MG: 5 INJECTION, SOLUTION INTRAVENOUS at 16:49

## 2020-02-04 RX ADMIN — CEFAZOLIN SODIUM 1000 MG: 1 SOLUTION INTRAVENOUS at 21:23

## 2020-02-04 RX ADMIN — CEFAZOLIN SODIUM 1000 MG: 1 SOLUTION INTRAVENOUS at 09:33

## 2020-02-04 RX ADMIN — HYDROMORPHONE HYDROCHLORIDE 0.2 MG: 1 INJECTION, SOLUTION INTRAMUSCULAR; INTRAVENOUS; SUBCUTANEOUS at 06:38

## 2020-02-04 RX ADMIN — BACITRACIN ZINC, NEOMYCIN SULFATE, AND POLYMYXIN B SULFATE 1 SMALL APPLICATION: 400; 3.5; 5 OINTMENT TOPICAL at 09:46

## 2020-02-04 RX ADMIN — IPRATROPIUM BROMIDE 0.5 MG: 0.5 SOLUTION RESPIRATORY (INHALATION) at 07:16

## 2020-02-04 RX ADMIN — MAGNESIUM SULFATE HEPTAHYDRATE 2 G: 40 INJECTION, SOLUTION INTRAVENOUS at 11:49

## 2020-02-04 RX ADMIN — IPRATROPIUM BROMIDE 0.5 MG: 0.5 SOLUTION RESPIRATORY (INHALATION) at 20:02

## 2020-02-04 RX ADMIN — Medication: at 15:02

## 2020-02-04 RX ADMIN — BISACODYL 10 MG: 10 SUPPOSITORY RECTAL at 09:43

## 2020-02-04 RX ADMIN — IPRATROPIUM BROMIDE 0.5 MG: 0.5 SOLUTION RESPIRATORY (INHALATION) at 13:48

## 2020-02-04 RX ADMIN — HEPARIN SODIUM 5000 UNITS: 5000 INJECTION INTRAVENOUS; SUBCUTANEOUS at 21:15

## 2020-02-04 RX ADMIN — HYDROMORPHONE HYDROCHLORIDE 0.2 MG: 1 INJECTION, SOLUTION INTRAMUSCULAR; INTRAVENOUS; SUBCUTANEOUS at 00:21

## 2020-02-04 RX ADMIN — DIAZEPAM 2.5 MG: 10 INJECTION, SOLUTION INTRAMUSCULAR; INTRAVENOUS at 11:31

## 2020-02-04 RX ADMIN — HYDROMORPHONE HYDROCHLORIDE 0.2 MG: 1 INJECTION, SOLUTION INTRAMUSCULAR; INTRAVENOUS; SUBCUTANEOUS at 02:03

## 2020-02-04 RX ADMIN — SODIUM CHLORIDE, SODIUM LACTATE, POTASSIUM CHLORIDE, AND CALCIUM CHLORIDE 20 ML/HR: .6; .31; .03; .02 INJECTION, SOLUTION INTRAVENOUS at 15:11

## 2020-02-04 RX ADMIN — THIAMINE HYDROCHLORIDE: 100 INJECTION, SOLUTION INTRAMUSCULAR; INTRAVENOUS at 21:30

## 2020-02-04 RX ADMIN — LEVALBUTEROL HYDROCHLORIDE 1.25 MG: 1.25 SOLUTION, CONCENTRATE RESPIRATORY (INHALATION) at 07:16

## 2020-02-04 RX ADMIN — METOPROLOL TARTRATE 5 MG: 5 INJECTION, SOLUTION INTRAVENOUS at 11:48

## 2020-02-04 RX ADMIN — Medication 1 SPRAY: at 09:48

## 2020-02-04 RX ADMIN — HEPARIN SODIUM 5000 UNITS: 5000 INJECTION INTRAVENOUS; SUBCUTANEOUS at 05:15

## 2020-02-04 RX ADMIN — PANTOPRAZOLE SODIUM 40 MG: 40 INJECTION, POWDER, FOR SOLUTION INTRAVENOUS at 09:37

## 2020-02-04 RX ADMIN — HEPARIN SODIUM 5000 UNITS: 5000 INJECTION INTRAVENOUS; SUBCUTANEOUS at 13:34

## 2020-02-04 RX ADMIN — ONDANSETRON 4 MG: 2 INJECTION INTRAMUSCULAR; INTRAVENOUS at 15:15

## 2020-02-04 RX ADMIN — PANTOPRAZOLE SODIUM 40 MG: 40 INJECTION, POWDER, FOR SOLUTION INTRAVENOUS at 21:15

## 2020-02-04 RX ADMIN — METOPROLOL TARTRATE 5 MG: 5 INJECTION, SOLUTION INTRAVENOUS at 05:15

## 2020-02-04 RX ADMIN — POTASSIUM CHLORIDE 40 MEQ: 29.8 INJECTION, SOLUTION INTRAVENOUS at 13:43

## 2020-02-04 RX ADMIN — HYDROMORPHONE HYDROCHLORIDE 0.2 MG: 1 INJECTION, SOLUTION INTRAMUSCULAR; INTRAVENOUS; SUBCUTANEOUS at 13:20

## 2020-02-04 RX ADMIN — DEXTRAN 70 AND HYPROMELLOSE 2910 1 DROP: 1; 3 SOLUTION/ DROPS OPHTHALMIC at 09:43

## 2020-02-04 RX ADMIN — LEVALBUTEROL HYDROCHLORIDE 1.25 MG: 1.25 SOLUTION, CONCENTRATE RESPIRATORY (INHALATION) at 13:48

## 2020-02-04 RX ADMIN — HYDROMORPHONE HYDROCHLORIDE 0.2 MG: 1 INJECTION, SOLUTION INTRAMUSCULAR; INTRAVENOUS; SUBCUTANEOUS at 09:08

## 2020-02-04 RX ADMIN — DEXTRAN 70 AND HYPROMELLOSE 2910 1 DROP: 1; 3 SOLUTION/ DROPS OPHTHALMIC at 15:12

## 2020-02-04 RX ADMIN — DEXTRAN 70 AND HYPROMELLOSE 2910 1 DROP: 1; 3 SOLUTION/ DROPS OPHTHALMIC at 21:24

## 2020-02-04 RX ADMIN — LEVALBUTEROL HYDROCHLORIDE 1.25 MG: 1.25 SOLUTION, CONCENTRATE RESPIRATORY (INHALATION) at 20:02

## 2020-02-04 NOTE — ANESTHESIA POSTPROCEDURE EVALUATION
Post-Op Assessment Note    CV Status:  Stable  Pain Score: 0    Pain management: adequate     Mental Status:  Awake   Hydration Status:  Stable   PONV Controlled:  None   Airway Patency:  Patent   Post Op Vitals Reviewed: Yes      Staff: Anesthesiologist, CRNA           BP   135/72   Temp   98 5   Pulse  98   Resp  16   SpO2   97

## 2020-02-04 NOTE — PROGRESS NOTES
Progress Note - General Surgery   Marni Hart 82 y o  female MRN: [id number]  Unit/Bed#: [facility] 802-01 Encounter: [id number]    Assessment:  81yo F with gastric outlet obstruction s/p multiple EGDs, now POD#1 s/p ex lap, antrectomy with B-II gastrojejunostomy    RUQ CATHERINE: 150  NGT: 0    Plan:  · NPO/NGT  · MIVF while NPO  · TPN  · Maintain CATHERINE, monitor output  · Pain control: will switch to PCA-d today  · F/u pathology  · F/u AM labs, replete electrolytes PRN  · DVT PPx: SQH, SCDs    Subjective/Objective     Subjective:   No acute events overnight  Patient Reports her abdominal pain has been poorly controlled overnight  Also feels mildly SOB at present  Unhappy about receiving NG tube intraoperatively  Explained to patient importance of keeping bowel decompressed  Objective:    Blood pressure 127/84, pulse 83, temperature 98 4 °F (36 9 °C), resp  rate 18, height 5' (1 524 m), weight 64 7 kg (142 lb 11 2 oz), SpO2 96 %, not currently breastfeeding  ,Body mass index is 27 87 kg/m²        Intake/Output Summary (Last 24 hours) at 2/4/2020 1026  Last data filed at 2/4/2020 0800  Gross per 24 hour   Intake 800 ml   Output 2305 ml   Net -1505 ml       Invasive Devices     Peripherally Inserted Central Catheter Line            PICC Line 02/01/20 Right Brachial 2 days          Drain            External Urinary Catheter 1 day    Closed/Suction Drain Right RUQ Bulb 10 Fr  less than 1 day    NG/OG/Enteral Tube Nasogastric 18 Fr Right nares less than 1 day                Physical Exam:   Gen:  NAD  HEENT: NGT in place  CV:  RRR  Lungs: nl effort  Abd:  soft, ND, appropriately tender, midline incision C/D/I with dressing in place, CATHERINE in place to bulb suction with serosang output  Ext:  no CCE  Skin:  no rashes  Neuro: A&Ox3     Results from last 7 days   Lab Units 02/03/20  1313 02/02/20  0624 02/01/20  0849   WBC Thousand/uL 7 33 8 80 10 86*   HEMOGLOBIN g/dL 10 5* 9 9* 10 4*   HEMATOCRIT % 34 7* 32 4* 34 9   PLATELETS Thousands/uL 298 254 252     Results from last 7 days   Lab Units 02/04/20  0524 02/03/20  1313 02/02/20  0505   POTASSIUM mmol/L 3 2* 3 7 3 5   CHLORIDE mmol/L 109* 107 106   CO2 mmol/L 23 28 30   BUN mg/dL 8 7 5   CREATININE mg/dL 0 43* 0 40* 0 40*   CALCIUM mg/dL 7 3* 8 7 8 7

## 2020-02-04 NOTE — PROGRESS NOTES
Pt to start on dilaudid pca; no ivf's ordered; spoke with dr Elena Adair; orders rec'd to start lr at 20ml/hr

## 2020-02-04 NOTE — PLAN OF CARE
Problem: Prexisting or High Potential for Compromised Skin Integrity  Goal: Skin integrity is maintained or improved  Description  INTERVENTIONS:  - Identify patients at risk for skin breakdown  - Assess and monitor skin integrity  - Assess and monitor nutrition and hydration status  - Monitor labs   - Assess for incontinence   - Turn and reposition patient  - Assist with mobility/ambulation  - Relieve pressure over bony prominences  - Avoid friction and shearing  - Provide appropriate hygiene as needed including keeping skin clean and dry  - Evaluate need for skin moisturizer/barrier cream  - Collaborate with interdisciplinary team   - Patient/family teaching  - Consider wound care consult   Outcome: Progressing     Problem: Potential for Falls  Goal: Patient will remain free of falls  Description  INTERVENTIONS:  - Assess patient frequently for physical needs  -  Identify cognitive and physical deficits and behaviors that affect risk of falls    -  [place] fall precautions as indicated by assessment   - Educate patient/family on patient safety including physical limitations  - Instruct patient to call for assistance with activity based on assessment  - Modify environment to reduce risk of injury  - Consider OT/PT consult to assist with strengthening/mobility  Outcome: Progressing     Problem: PAIN - ADULT  Goal: Verbalizes/displays adequate comfort level or baseline comfort level  Description  Interventions:  - Encourage patient to monitor pain and request assistance  - Assess pain using appropriate pain scale  - Administer analgesics based on type and severity of pain and evaluate response  - Implement non-pharmacological measures as appropriate and evaluate response  - Consider cultural and social influences on pain and pain management  - Notify physician/advanced practitioner if interventions unsuccessful or patient reports new pain  Outcome: Progressing     Problem: INFECTION - ADULT  Goal: Absence or prevention of progression during hospitalization  Description  INTERVENTIONS:  - Assess and monitor for signs and symptoms of infection  - Monitor lab/diagnostic results  - Monitor all insertion sites, i e  indwelling lines, tubes, and drains  - Monitor endotracheal if appropriate and nasal secretions for changes in amount and color  - [place] appropriate cooling/warming therapies per order  - Administer medications as ordered  - Instruct and encourage patient and family to use good hand hygiene technique  - Identify and instruct in appropriate isolation precautions for identified infection/condition  Outcome: Progressing  Goal: Absence of fever/infection during neutropenic period  Description  INTERVENTIONS:  - Monitor WBC    Outcome: Progressing     Problem: SAFETY ADULT  Goal: Patient will remain free of falls  Description  INTERVENTIONS:  - Assess patient frequently for physical needs  -  Identify cognitive and physical deficits and behaviors that affect risk of falls    -  [place] fall precautions as indicated by assessment   - Educate patient/family on patient safety including physical limitations  - Instruct patient to call for assistance with activity based on assessment  - Modify environment to reduce risk of injury  - Consider OT/PT consult to assist with strengthening/mobility  Outcome: Progressing  Goal: Maintain or return to baseline ADL function  Description  INTERVENTIONS:  -  Assess patient's ability to carry out ADLs; assess patient's baseline for ADL function and identify physical deficits which impact ability to perform ADLs (bathing, care of mouth/teeth, toileting, grooming, dressing, etc )  - Assess/evaluate cause of self-care deficits   - Assess range of motion  - Assess patient's mobility; develop plan if impaired  - Assess patient's need for assistive devices and provide as appropriate  - Encourage maximum independence but intervene and supervise when necessary  - Involve family in performance of ADLs  - Assess for home care needs following discharge   - Consider OT consult to assist with ADL evaluation and planning for discharge  - Provide patient education as appropriate  Outcome: Progressing  Goal: Maintain or return mobility status to optimal level  Description  INTERVENTIONS:  - Assess patient's baseline mobility status (ambulation, transfers, stairs, etc )    - Identify cognitive and physical deficits and behaviors that affect mobility  - Identify mobility aids required to assist with transfers and/or ambulation (gait belt, sit-to-stand, lift, walker, cane, etc )  - [place] fall precautions as indicated by assessment  - Record patient progress and toleration of activity level on Mobility SBAR; progress patient to next Phase/Stage  - Instruct patient to call for assistance with activity based on assessment  - Consider rehabilitation consult to assist with strengthening/weightbearing, etc   Outcome: Progressing     Problem: DISCHARGE PLANNING  Goal: Discharge to home or other facility with appropriate resources  Description  INTERVENTIONS:  - Identify barriers to discharge w/patient and caregiver  - Arrange for needed discharge resources and transportation as appropriate  - Identify discharge learning needs (meds, wound care, etc )  - Arrange for interpretive services to assist at discharge as needed  - Refer to Case Management Department for coordinating discharge planning if the patient needs post-hospital services based on physician/advanced practitioner order or complex needs related to functional status, cognitive ability, or social support system  Outcome: Progressing     Problem: Knowledge Deficit  Goal: Patient/family/caregiver demonstrates understanding of disease process, treatment plan, medications, and discharge instructions  Description  Complete learning assessment and assess knowledge base    Interventions:  - Provide teaching at level of understanding  - Provide teaching via preferred learning methods  Outcome: Progressing     Problem: RESPIRATORY - ADULT  Goal: Achieves optimal ventilation and oxygenation  Description  INTERVENTIONS:  - Assess for changes in respiratory status  - Assess for changes in mentation and behavior  - Position to facilitate oxygenation and minimize respiratory effort  - Oxygen administered by appropriate delivery if ordered  - Initiate smoking cessation education as indicated  - Encourage broncho-pulmonary hygiene including cough, deep breathe, Incentive Spirometry  - Assess the need for suctioning and aspirate as needed  - Assess and instruct to report SOB or any respiratory difficulty  - Respiratory Therapy support as indicated  Outcome: Progressing     Problem: GASTROINTESTINAL - ADULT  Goal: Minimal or absence of nausea and/or vomiting  Description  INTERVENTIONS:  - Administer IV fluids if ordered to ensure adequate hydration  - Maintain NPO status until nausea and vomiting are resolved  - Nasogastric tube if ordered  - Administer ordered antiemetic medications as needed  - Provide nonpharmacologic comfort measures as appropriate  - Advance diet as tolerated, if ordered  - Consider nutrition services referral to assist patient with adequate nutrition and appropriate food choices  Outcome: Progressing  Goal: Maintains or returns to baseline bowel function  Description  INTERVENTIONS:  - Assess bowel function  - Encourage oral fluids to ensure adequate hydration  - Administer IV fluids if ordered to ensure adequate hydration  - Administer ordered medications as needed  - Encourage mobilization and activity  - Consider nutritional services referral to assist patient with adequate nutrition and appropriate food choices  Outcome: Progressing  Goal: Maintains adequate nutritional intake  Description  INTERVENTIONS:  - Monitor percentage of each meal consumed  - Identify factors contributing to decreased intake, treat as appropriate  - Assist with meals as needed  - Monitor I&O, weight, and lab values if indicated  - Obtain nutrition services referral as needed  Outcome: Progressing     Problem: METABOLIC, FLUID AND ELECTROLYTES - ADULT  Goal: Electrolytes maintained within normal limits  Description  INTERVENTIONS:  - Monitor labs and assess patient for signs and symptoms of electrolyte imbalances  - Administer electrolyte replacement as ordered  - Monitor response to electrolyte replacements, including repeat lab results as appropriate  - Instruct patient on fluid and nutrition as appropriate  Outcome: Progressing  Goal: Fluid balance maintained  Description  INTERVENTIONS:  - Monitor labs   - Monitor I/O and WT  - Instruct patient on fluid and nutrition as appropriate  - Assess for signs & symptoms of volume excess or deficit  Outcome: Progressing     Problem: MUSCULOSKELETAL - ADULT  Goal: Maintain or return mobility to safest level of function  Description  INTERVENTIONS:  - Assess patient's ability to carry out ADLs; assess patient's baseline for ADL function and identify physical deficits which impact ability to perform ADLs (bathing, care of mouth/teeth, toileting, grooming, dressing, etc )  - Assess/evaluate cause of self-care deficits   - Assess range of motion  - Assess patient's mobility  - Assess patient's need for assistive devices and provide as appropriate  - Encourage maximum independence but intervene and supervise when necessary  - Involve family in performance of ADLs  - Assess for home care needs following discharge   - Consider OT consult to assist with ADL evaluation and planning for discharge  - Provide patient education as appropriate  Outcome: Progressing  Goal: Maintain proper alignment of affected body part  Description  INTERVENTIONS:  - Support, maintain and protect limb and body alignment  - Provide patient/ family with appropriate education  Outcome: Progressing     Problem: Nutrition/Hydration-ADULT  Goal: Nutrient/Hydration intake appropriate for improving, restoring or maintaining nutritional needs  Description  Monitor and assess patient's nutrition/hydration status for malnutrition  Collaborate with interdisciplinary team and initiate plan and interventions as ordered  Monitor patient's weight and dietary intake as ordered or per policy  Utilize nutrition screening tool and intervene as necessary  Determine patient's food preferences and provide high-protein, high-caloric foods as appropriate       INTERVENTIONS:  - Monitor oral intake, urinary output, labs, and treatment plans  - Assess nutrition and hydration status and recommend course of action  - Evaluate amount of meals eaten  - Assist patient with eating if necessary   - Allow adequate time for meals  - Recommend/ encourage appropriate diets, oral nutritional supplements, and vitamin/mineral supplements  - Order, calculate, and assess calorie counts as needed  - Recommend, monitor, and adjust tube feedings and TPN/PPN based on assessed needs  - Assess need for intravenous fluids  - Provide specific nutrition/hydration education as appropriate  - Include patient/family/caregiver in decisions related to nutrition  Outcome: Progressing     Problem: COPING  Goal: Pt/Family able to verbalize concerns and demonstrate effective coping strategies  Description  INTERVENTIONS:  - Assist patient/family to identify coping skills, available support systems and cultural and spiritual values  - Provide emotional support, including active listening and acknowledgement of concerns of patient and caregivers  - Reduce environmental stimuli, as able  - Provide patient education  - Assess for spiritual pain/suffering and initiate spiritual care, including notification of Pastoral Care or [place] based community as needed  - Assess effectiveness of coping strategies  Outcome: Progressing  Goal: Will report anxiety at manageable levels  Description  INTERVENTIONS:  - Administer medication as ordered  - Teach and encourage coping skills  - Provide emotional support  - Assess patient/family for anxiety and ability to cope  Outcome: Progressing     Problem: CARDIOVASCULAR - ADULT  Goal: Maintains optimal cardiac output and hemodynamic stability  Description  INTERVENTIONS:  - Monitor I/O, vital signs and rhythm  - Monitor for S/S and trends of decreased cardiac output  - Administer and titrate ordered vasoactive medications to optimize hemodynamic stability  - Assess quality of pulses, skin color and temperature  - Assess for signs of decreased coronary artery perfusion  - Instruct patient to report change in severity of symptoms  Outcome: Progressing  Goal: Absence of cardiac dysrhythmias or at baseline rhythm  Description  INTERVENTIONS:  - Continuous cardiac monitoring, vital signs, obtain 12 lead EKG if ordered  - Administer antiarrhythmic and heart rate control medications as ordered  - Monitor electrolytes and administer replacement therapy as ordered  Outcome: Progressing

## 2020-02-04 NOTE — SOCIAL WORK
Pt reviewed with provider  Patient not medically clear for d/c this week, possible early next week  Patient will remain NPO with NG tube  CM following

## 2020-02-04 NOTE — PROGRESS NOTES
General Cardiology Progress Note   Gabriela Cristobal 82 y o  female MRN: [id number]  Unit/Bed#: [facility] 802-01 Encounter: [id number]      Assessment:  Principal Problem:    Gastric outlet obstruction  Active Problems:    COPD (chronic obstructive pulmonary disease) (HCC)    Colorectal polyps    Chronic low back pain    Atrial fibrillation with rapid ventricular response (HCC)    Compression fracture of L4 vertebra (HCC)    Closed wedge compression fracture of T12 vertebra (HCC)      Impression:     PAF with RVR  o Remains in NSR  o Awaiting surgery clearance for AC   o IV Lopressor scheduled, IV Cardizem drip continue considering NG tube   Gastric outlet obstruction  o POD 1 ex lap, antrectomy with B-II gastrojejunostomy  o NGT for gastric decompression    Plan:     Eliquis 5 milligrams p o  B i d  When patient finally taking p o  And when okay with the surgical team   Chito Chris Otherwise remains in normal sinus rhythm, can discontinue telemetry    Subjective:   Patient seen and examined  abdominal pain today  No palpitations  Vital signs is normal, AFib not recurrent but telemetry    ROS    Objective   Vitals: Blood pressure 133/77, pulse 89, temperature 99 2 °F (37 3 °C), resp  rate 22, height 5' (1 524 m), weight 64 7 kg (142 lb 11 2 oz), SpO2 96 %, not currently breastfeeding , Body mass index is 27 87 kg/m² , I/O last 3 completed shifts: In: 850 [I V :800; IV Piggyback:50]  Out: 0138 [Urine:4305; Drains:150]  No intake/output data recorded  Wt Readings from Last 3 Encounters:   01/30/20 64 7 kg (142 lb 11 2 oz)   01/21/20 68 9 kg (151 lb 14 4 oz)   01/08/20 67 kg (147 lb 9 6 oz)       Intake/Output Summary (Last 24 hours) at 2/4/2020 1317  Last data filed at 2/4/2020 1200  Gross per 24 hour   Intake 800 ml   Output 1255 ml   Net -455 ml     I/O last 3 completed shifts:   In: 850 [I V :800; IV Piggyback:50]  Out: 7366 [Urine:4305; Drains:150]      Physical Exam   Constitutional: She is oriented to person, place, and time  No distress  HENT:   Head: Normocephalic  Eyes: Conjunctivae are normal    Neck: Normal range of motion  Neck supple  No JVD present  Cardiovascular: Normal rate, regular rhythm, normal heart sounds and intact distal pulses  Exam reveals no gallop  No murmur heard  Pulmonary/Chest: Effort normal and breath sounds normal  No respiratory distress  She has no wheezes  She has no rales  Abdominal: Soft  Bowel sounds are normal  She exhibits no distension  There is tenderness  Musculoskeletal: Normal range of motion  She exhibits no edema  Neurological: She is alert and oriented to person, place, and time  Skin: Skin is warm and dry  She is not diaphoretic         Meds/Allergies   Allergies   Allergen Reactions    Fluticasone        Current Facility-Administered Medications:     acetaminophen (TYLENOL) oral suspension 650 mg, 650 mg, Oral, Q4H PRN, Marbin Mahmood MD, 650 mg at 01/30/20 1001    Adult 3-in-1 TPN (custom base / custom electrolytes), , Intravenous, Continuous, Constantino Blanco PA-C    Adult 3-in-1 TPN (custom base / standard electrolytes), , Intravenous, Continuous, Marbin Mahmood MD, Last Rate: 69 5 mL/hr at 02/03/20 2254    albuterol inhalation solution 2 5 mg, 2 5 mg, Nebulization, Q4H PRN, Marbin Mahmood MD, 2 5 mg at 01/29/20 0128    bisacodyl (DULCOLAX) rectal suppository 10 mg, 10 mg, Rectal, Daily, Marbin Mahmood MD, 10 mg at 02/04/20 0943    ceFAZolin (ANCEF) IVPB (premix) 1,000 mg, 1,000 mg, Intravenous, Q12H, Marbin Mahmood MD, Last Rate: 100 mL/hr at 02/04/20 0933, 1,000 mg at 02/04/20 0933    dextran 70-hypromellose (GENTEAL TEARS) 0 1-0 3 % ophthalmic solution 1 drop, 1 drop, Both Eyes, TID, Marbin Mahmood MD, 1 drop at 02/04/20 0943    diazepam (VALIUM) injection 2 5 mg, 2 5 mg, Intravenous, Q8H PRN, Constantino Blanco PA-C, 2 5 mg at 02/04/20 1131    diltiazem (CARDIZEM) 125 mg in sodium chloride 0 9 % 125 mL infusion, 1-15 mg/hr, Intravenous, Continuous, Marbin Mahmood MD, Stopped at 01/30/20 2022    heparin (porcine) subcutaneous injection 5,000 Units, 5,000 Units, Subcutaneous, Q8H [facility], Jaspreet Blum MD, 5,000 Units at 02/04/20 0515    HYDROmorphone (DILAUDID) injection 0 2 mg, 0 2 mg, Intravenous, Q2H PRN, Jaspreet Blum MD, 0 2 mg at 02/04/20 0908    HYDROmorphone (DILAUDID) injection 0 5 mg, 0 5 mg, Intravenous, Q2H PRN, Jaspreet Blum MD    ipratropium (ATROVENT) 0 02 % inhalation solution 0 5 mg, 0 5 mg, Nebulization, TID, Jaspreet Blum MD, 0 5 mg at 02/04/20 0716    levalbuterol [facility]) inhalation solution 1 25 mg, 1 25 mg, Nebulization, TID, Jaspreet Blum MD, 1 25 mg at 02/04/20 0716    magnesium sulfate 2 g/50 mL IVPB (premix) 2 g, 2 g, Intravenous, Once, Magdalena Johnson PA-C, 2 g at 02/04/20 1149    metoprolol (LOPRESSOR) injection 5 mg, 5 mg, Intravenous, Q6H PRN, Jaspreet Blum MD, 5 mg at 01/29/20 1607    metoprolol (LOPRESSOR) injection 5 mg, 5 mg, Intravenous, Q6H, Jaspreet Blum MD, 5 mg at 02/04/20 1148    neomycin-bacitracin-polymyxin b (NEOSPORIN) ointment 1 small application, 1 small application, Topical, Daily, Jaspreet Blum MD, 1 small application at 37/59/62 0946    ondansetron (ZOFRAN) injection 4 mg, 4 mg, Intravenous, Q4H PRN, Jaspreet Blum MD    pantoprazole (PROTONIX) injection 40 mg, 40 mg, Intravenous, Q12H [facility], Jaspreet Blum MD, 40 mg at 02/04/20 0937    phenol (CHLORASEPTIC) 1 4 % mucosal liquid 1 spray, 1 spray, Mouth/Throat, Q2H PRN, Jaspreet Blum MD, 1 spray at 02/04/20 0948    potassium chloride 40 mEq IVPB (premix), 40 mEq, Intravenous, Once, Magdalena Johnson PA-C    saliva substitute (MOUTH KOTE) mucosal solution 5 spray, 5 spray, Mouth/Throat, 4x Daily PRN, Jaspreet Blum MD, 5 spray at 02/03/20 1155    simethicone (MYLICON) chewable tablet 80 mg, 80 mg, Oral, Q6H PRN, Jaspreet Blum MD, 80 mg at 01/31/20 1608    sodium chloride (OCEAN) 0 65 % nasal spray 1 spray, 1 spray, Each Nare, Q1H PRN, Jaspreet Blum MD, 1 spray at 02/02/20 1153    Laboratory Results:        CBC with diff:   Results from last 7 days   Lab Units 02/03/20  1313 02/02/20  0624 02/01/20  0849 01/31/20  0438 01/30/20  0449 01/29/20  0634   WBC Thousand/uL 7 33 8 80 10 86* 13 85* 9 28 6 84   HEMOGLOBIN g/dL 10 5* 9 9* 10 4* 10 6* 11 6 11 3*   HEMATOCRIT % 34 7* 32 4* 34 9 35 9 38 2 37 9   MCV fL 85 86 86 87 86 87   PLATELETS Thousands/uL 298 254 252 298 360 309   MCH pg 25 7* 26 4* 25 6* 25 7* 26 1* 25 8*   MCHC g/dL 30 3* 30 6* 29 8* 29 5* 30 4* 29 8*   RDW % 15 7* 15 9* 16 2* 16 1* 16 0* 15 9*   MPV fL 9 7 9 4 9 0 9 5 9 7 9 8   NRBC AUTO /100 WBCs 0 0 0 0 0 0       CMP:  Results from last 7 days   Lab Units 02/04/20  0524 02/03/20  1313 02/02/20  0505 02/01/20  0850 01/31/20  0825 01/31/20  0438 01/30/20  0449   POTASSIUM mmol/L 3 2* 3 7 3 5 3 8 3 6 4 0 4 4   CHLORIDE mmol/L 109* 107 106 108 105 106 109*   CO2 mmol/L 23 28 30 28 27 28 27   BUN mg/dL 8 7 5 3* 2* 2* 1*   CREATININE mg/dL 0 43* 0 40* 0 40* 0 44* 0 60 0 56* 0 53*   CALCIUM mg/dL 7 3* 8 7 8 7 8 7 8 6 8 4 9 0   AST U/L  --   --  10  --  17  --   --    ALT U/L  --   --  18  --  30  --   --    ALK PHOS U/L  --   --  112  --  94  --   --    EGFR ml/min/1 73sq m 95 97 97 94 85 87 89       BMP:  Results from last 7 days   Lab Units 02/04/20  0524 02/03/20  1313 02/02/20  0505 02/01/20  0850 01/31/20  0825 01/31/20  0438 01/30/20  0449   POTASSIUM mmol/L 3 2* 3 7 3 5 3 8 3 6 4 0 4 4   CHLORIDE mmol/L 109* 107 106 108 105 106 109*   CO2 mmol/L 23 28 30 28 27 28 27   BUN mg/dL 8 7 5 3* 2* 2* 1*   CREATININE mg/dL 0 43* 0 40* 0 40* 0 44* 0 60 0 56* 0 53*   CALCIUM mg/dL 7 3* 8 7 8 7 8 7 8 6 8 4 9 0       NT-proBNP: No results for input(s): NTBNP in the last 72 hours       Magnesium:   Results from last 7 days   Lab Units 02/04/20  0524 02/03/20  1313 02/02/20  0505 01/31/20  0825 01/30/20  0449 01/29/20  0643   MAGNESIUM mg/dL 1 7 2 3 2 2 1 8 2 2 1 7       Coags:       TSH:        Hemoglobin A1C )      Lipid Profile:   Lab Results   Component Value Date CHOL 229 2016     Lab Results   Component Value Date    HDL 42 2016     Lab Results   Component Value Date    LDLCALC 171 (H) 2016     No results found for: LDLDIRECT  Lab Results   Component Value Date    TRIG 151 (H) 2020    TRIG 79 2016       Cardiac testing:   EKG personally reviewed by Ridge Whalen MD      Results for orders placed during the hospital encounter of 10/06/19   Echo complete with contrast if indicated    Narrative [facility] [employer] [address]    Transthoracic Echocardiogram  2D, M-mode, Doppler, and Color Doppler    Study date:  07-Oct-2019    Patient: Sandie Jarrett  MR number: [id number]  Account number: [de-identified]  : 1935  Age: 82 years  Gender: Female  Status: Inpatient  Location: [facility]   Height: 60 in  Weight: 164 lb  BP: 88/ 53 mmHg    Indications: Afib    Diagnoses: I48 0 - Atrial fibrillation    Sonographer:  Isabelle Hernandez RDCS  Referring Physician:  Clint Carpenter MD  Group:  [facility]  Interpreting Physician:  Yana Rodriguez MD    SUMMARY    LEFT VENTRICLE:  Systolic function was normal  Ejection fraction was estimated to be 60 %  There were no regional wall motion abnormalities  There was mild concentric hypertrophy  Doppler parameters were consistent with abnormal left ventricular relaxation (grade 1 diastolic dysfunction)  MITRAL VALVE:  There was mild annular calcification  There was trace regurgitation  AORTIC VALVE:  There was mild regurgitation  TRICUSPID VALVE:  There was trace regurgitation  HISTORY: PRIOR HISTORY: Congestive heart failure  Atrial fibrillation  Chronic lung disease  PROCEDURE: The study was performed in the [facility]  This was a routine study  The transthoracic approach was used  The study included complete 2D imaging, M-mode, complete spectral Doppler, and color Doppler  The  heart rate was 92 bpm, at the start of the study  Images were obtained from the parasternal, apical, subcostal, and suprasternal notch acoustic windows  Echocardiographic views were limited due to poor acoustic window availability,  decreased penetration, and lung interference  This was a technically difficult study  LEFT VENTRICLE: Size was normal  Systolic function was normal  Ejection fraction was estimated to be 60 %  There were no regional wall motion abnormalities  Wall thickness was mildly increased  There was mild concentric hypertrophy  DOPPLER: Doppler parameters were consistent with abnormal left ventricular relaxation (grade 1 diastolic dysfunction)  RIGHT VENTRICLE: The size was normal  Systolic function was normal  Wall thickness was normal     LEFT ATRIUM: Size was normal     RIGHT ATRIUM: Size was normal     MITRAL VALVE: There was mild annular calcification  Valve structure was normal  There was normal leaflet separation  DOPPLER: The transmitral velocity was within the normal range  There was no evidence for stenosis  There was trace  regurgitation  AORTIC VALVE: The valve was not well visualized  DOPPLER: Transaortic velocity was within the normal range  There was no evidence for stenosis  There was mild regurgitation  TRICUSPID VALVE: The valve structure was normal  There was normal leaflet separation  DOPPLER: The transtricuspid velocity was within the normal range  There was no evidence for stenosis  There was trace regurgitation  Pulmonary artery  systolic pressure was moderately increased  Estimated peak PA pressure was 65 mmHg  The findings suggest moderate pulmonary hypertension  PULMONIC VALVE: Not well visualized  DOPPLER: The transpulmonic velocity was within the normal range  There was no significant regurgitation  PERICARDIUM: There was no pericardial effusion  The pericardium was normal in appearance  AORTA: The root exhibited normal size      SYSTEMIC VEINS: IVC: The inferior vena cava was upper normal     SYSTEM MEASUREMENT TABLES    2D  %FS: 30 38 %  AV Diam: 3 11 cm  EDV(Teich): 61 91 ml  EF(Teich): 58 53 %  ESV(Teich): 25 68 ml  IVSd: 1 15 cm  LA Area: 16 7 cm2  LA Diam: 3 58 cm  LVEDV MOD A4C: 81 9 ml  LVEF MOD A4C: 56 03 %  LVESV MOD A4C: 36 01 ml  LVIDd: 3 8 cm  LVIDs: 2 65 cm  LVLd A4C: 6 83 cm  LVLs A4C: 5 86 cm  LVPWd: 1 12 cm  RA Area: 14 56 cm2  RV Diam : 3 27 cm  SV MOD A4C: 45 89 ml  SV(Cube): 36 32 ml  SV(Teich): 36 24 ml    CW  AR Dec [place]: 1 94 m/s2  AR Dec Time: 1840 69 ms  AR PHT: 533 8 ms  AR Vmax: 3 58 m/s  AR maxP 22 mmHg  TR Vmax: 3 49 m/s  TR maxP 16 mmHg    MM  TAPSE: 2 16 cm    PW  E': 0 07 m/s  E/E': 13 57  MV A Rodrigue: 1 44 m/s  MV Dec [place]: 2 9 m/s2  MV DecT: 337 71 ms  MV E Rodrigue: 0 98 m/s  MV E/A Ratio: 0 68    [employer] Accredited Echocardiography Laboratory    Prepared and electronically signed by    Elizabeth Lai MD  Signed 07-Oct-2019 21:51:59       No results found for this or any previous visit  No results found for this or any previous visit  No results found for this or any previous visit  No results found for this or any previous visit  No results found for this or any previous visit  Jv Cool MD    Portions of the record may have been created with voice recognition software  Occasional wrong word or "sound a like" substitutions may have occurred due to the inherent limitations of voice recognition software  Read the chart carefully and recognize, using context, where substitutions have occurred

## 2020-02-04 NOTE — QUICK NOTE
Nurse-Patient-Provider rounds were completed with the patient's nurse today, Jesus Lowry  We discussed the plan is to continue to keep the patient NPO with NG tube for gastric decompression  Continue IV analgesic regimen with addition of Valium; discussed possible initiation of PCA of current medication regimen is not working by the in today  Continue nutritional support via PICC line and administration of TPN  We reviewed all of the invasive devices/lines/telemetry orders   - Maintain PICC line indefinitely for ongoing IV medication and TPN administration  DVT Prophylaxis:  - Subcutaneous heparin and SCDs  Pain Assessment / Plan:  - Continue current analgesic regimen  Mobility Assessment / Plan:  - Activity as tolerated  - PT and OT evaluation and treatment as indicated  Goals / Barriers for discharge:  - Not yet appropriate for discharge in the postoperative setting while maintaining NG tube for gastric decompression during her recovery  Also, continues to require nutritional support with TPN   - Case management following; case and discharge needs discussed  All questions and concerns were addressed  I spent greater than 19 minutes reviewing the plan with the patient and the nurse, and coordinating care for the day      Volodymyr Miles PA-C  2/4/2020 12:01 PM

## 2020-02-04 NOTE — OP NOTE
OPERATIVE REPORT  PATIENT NAME: Torrie Nascimento    :  1935  MRN: [id number]  Pt Location: [facility] OR ROOM 07    SURGERY DATE: 2/3/2020    Surgeon(s) and Role:     * Iesha Forrest MD - Primary     * Lauren Hills MD - Assisting     * Katia Robertson MD - Assisting    Preop Diagnosis:  Gastric outlet obstruction [K31 1]    Post-Op Diagnosis Codes:     * Gastric outlet obstruction [K31 1]    Procedure(s) (LRB): Antrectomy with bilroth II Anastomosis (N/A)    Specimen(s):  ID Type Source Tests Collected by Time Destination   1 : Antrum Tissue Stomach TISSUE EXAM Iesha Forrest MD 2/3/2020 2025        Estimated Blood Loss:   Minimal    Drains:  Closed/Suction Drain Right RUQ Bulb 10 Fr  (Active)   Number of days: 0       NG/OG/Enteral Tube Nasogastric 18 Fr Right nares (Active)   Number of days: 0       External Urinary Catheter (Active)   Collection Container Canister and suction tubing (For Female) 2/3/2020  9:07 AM   Output (mL) 500 mL 2/3/2020 12:08 PM   Number of days: 0       Anesthesia Type:   General    Operative Indications:  Gastric outlet obstruction [K31 1]    Operative Findings:  Dense adhesions involving proximal small bowel as well as stomach and proximal duodenum  Gastric mass in pre-pyloric region along greater curvature    Complications:   None    Procedure and Technique:  The patient is brought to the operating room identified by name and armband  After induction general anesthesia and placement of endotracheal tube the abdomen was then prepped and draped in usual sterile fashion  Time-out was performed confirming the patient and procedure  A midline laparotomy was performed  Upon entry into the abdominal cavity, there dense adhesions noted between the greater omentum and abdominal wall  These adhesions were carefully lysed to mobilize the omentum cephalad to identify the proximal jejunum    The ligament of Treitz was identified, however the distal small bowel was densely adhered to the left abdomen  Again extensive adhesions were sharply lysed  A small serosal tear was made in the distal jejunum which was oversewn using silk Lembert sutures  Attention was then turned to the stomach which appeared chronically dilated enlarged  The antral and pyloric region were palpated and a mass was noted along the greater curvature immediately proximal to the pylorus  The decision was made at this time to perform a partial gastrectomy  The proximal duodenum was heavily scarred and adhesions were lysed until a normal area of duodenum was noted  This was transected using a DEON 75 stapler  The lesser curvature of the stomach was mobilized by division of the gastrohepatic ligament  The posterior wall the stomach was densely adhered the transverse mesocolon and the lesser sac  These adhesions were carefully divided using electrocautery  The proximal stomach was then divided using a DEON 75 stapler to fully encompass the palpable mass as well as areas of scarring in the pre-pyloric and pyloric region  The antrum was then passed off the field as specimen  The staple lines were then oversewn with running 3 0 silk  The decision was made to perform of aura to gastrojejunostomy  The previously mobilized small bowel was then brought antecolic and a segment was identified approximately 25 cm past the ligament Treitz  Silk stage sutures were used to align the gastric remnant with the small bowel  The posterior serosa was then approximated with interrupted 3 0 silk Lembert sutures  A longitudinal gastrotomy was then made by resecting lateral aspect of the gastrectomy staple line  A longitudinal enterotomy was made in the small bowel  The gastrojejunostomy was then performed in hand-sewn side-to-side fashion with 3 0 Vicryl  The serosa of the anterior wall aspect of the gastrojejunostomy was then imbricated with 3 Lembert sutures  A 10 [place]bourg[place] flat Yimi-Vasquez drain was placed adjacent to the duodenal stump  An NG tube was then placed and directly guided into the efferent limb of the gastrojejunostomy  The fascia was then closed with running PDS suture  The skin was then stapled  All counts were correct at the conclusion of the procedure  RF wanting was negative for retained foreign bodies  The attending physician was present for all portions of the procedure  The patient was woken from general anesthesia and transferred to PACU in stable condition        Patient Disposition:  PACU     SIGNATURE: Markel Meier MD  DATE: February 3, 2020  TIME: 9:27 PM

## 2020-02-04 NOTE — PROGRESS NOTES
Pt stating "the potassium bothers me  it's hurting my rib cage   You have to slow it down"; potassium slowed down to 15ml/hr; will continue to monitor

## 2020-02-04 NOTE — PROGRESS NOTES
Told to me  in report to not flush/manipulate ngt; orders in system to do not manipulate ngt, but no orders in to not flush ngt; spoke with renetta mcgovern pac; instructed to do not manipulate/flush tube as per report at this time

## 2020-02-05 ENCOUNTER — APPOINTMENT (INPATIENT)
Dept: RADIOLOGY | Facility: HOSPITAL | Age: 85
DRG: 326 | End: 2020-02-05
Payer: MEDICARE

## 2020-02-05 LAB
ANION GAP SERPL CALCULATED.3IONS-SCNC: 3 MMOL/L (ref 4–13)
BACTERIA BLD CULT: NORMAL
BACTERIA BLD CULT: NORMAL
BUN SERPL-MCNC: 13 MG/DL (ref 5–25)
CA-I BLD-SCNC: 1.12 MMOL/L (ref 1.12–1.32)
CALCIUM SERPL-MCNC: 8.3 MG/DL (ref 8.3–10.1)
CHLORIDE SERPL-SCNC: 104 MMOL/L (ref 100–108)
CO2 SERPL-SCNC: 29 MMOL/L (ref 21–32)
CREAT SERPL-MCNC: 0.38 MG/DL (ref 0.6–1.3)
ERYTHROCYTE [DISTWIDTH] IN BLOOD BY AUTOMATED COUNT: 15.6 % (ref 11.6–15.1)
GFR SERPL CREATININE-BSD FRML MDRD: 99 ML/MIN/1.73SQ M
GLUCOSE SERPL-MCNC: 148 MG/DL (ref 65–140)
HCT VFR BLD AUTO: 34.8 % (ref 34.8–46.1)
HGB BLD-MCNC: 10.6 G/DL (ref 11.5–15.4)
MAGNESIUM SERPL-MCNC: 2.3 MG/DL (ref 1.6–2.6)
MCH RBC QN AUTO: 26.1 PG (ref 26.8–34.3)
MCHC RBC AUTO-ENTMCNC: 30.5 G/DL (ref 31.4–37.4)
MCV RBC AUTO: 86 FL (ref 82–98)
PHOSPHATE SERPL-MCNC: 3.5 MG/DL (ref 2.3–4.1)
PLATELET # BLD AUTO: 254 THOUSANDS/UL (ref 149–390)
PMV BLD AUTO: 9.7 FL (ref 8.9–12.7)
POTASSIUM SERPL-SCNC: 4 MMOL/L (ref 3.5–5.3)
RBC # BLD AUTO: 4.06 MILLION/UL (ref 3.81–5.12)
SODIUM SERPL-SCNC: 136 MMOL/L (ref 136–145)
WBC # BLD AUTO: 13.09 THOUSAND/UL (ref 4.31–10.16)

## 2020-02-05 PROCEDURE — 74018 RADEX ABDOMEN 1 VIEW: CPT

## 2020-02-05 PROCEDURE — 82330 ASSAY OF CALCIUM: CPT | Performed by: STUDENT IN AN ORGANIZED HEALTH CARE EDUCATION/TRAINING PROGRAM

## 2020-02-05 PROCEDURE — C9113 INJ PANTOPRAZOLE SODIUM, VIA: HCPCS | Performed by: SURGERY

## 2020-02-05 PROCEDURE — 84100 ASSAY OF PHOSPHORUS: CPT | Performed by: STUDENT IN AN ORGANIZED HEALTH CARE EDUCATION/TRAINING PROGRAM

## 2020-02-05 PROCEDURE — 94640 AIRWAY INHALATION TREATMENT: CPT

## 2020-02-05 PROCEDURE — 99024 POSTOP FOLLOW-UP VISIT: CPT | Performed by: SURGERY

## 2020-02-05 PROCEDURE — 99232 SBSQ HOSP IP/OBS MODERATE 35: CPT | Performed by: INTERNAL MEDICINE

## 2020-02-05 PROCEDURE — 94760 N-INVAS EAR/PLS OXIMETRY 1: CPT

## 2020-02-05 PROCEDURE — 85027 COMPLETE CBC AUTOMATED: CPT | Performed by: PHYSICIAN ASSISTANT

## 2020-02-05 PROCEDURE — 80048 BASIC METABOLIC PNL TOTAL CA: CPT | Performed by: PHYSICIAN ASSISTANT

## 2020-02-05 PROCEDURE — 83735 ASSAY OF MAGNESIUM: CPT | Performed by: PHYSICIAN ASSISTANT

## 2020-02-05 RX ORDER — HYDROMORPHONE HCL/PF 1 MG/ML
0.2 SYRINGE (ML) INJECTION
Status: DISCONTINUED | OUTPATIENT
Start: 2020-02-05 | End: 2020-02-06

## 2020-02-05 RX ORDER — METOCLOPRAMIDE HYDROCHLORIDE 5 MG/ML
5 INJECTION INTRAMUSCULAR; INTRAVENOUS EVERY 6 HOURS SCHEDULED
Status: DISCONTINUED | OUTPATIENT
Start: 2020-02-05 | End: 2020-02-14 | Stop reason: HOSPADM

## 2020-02-05 RX ORDER — SODIUM CHLORIDE, SODIUM LACTATE, POTASSIUM CHLORIDE, CALCIUM CHLORIDE 600; 310; 30; 20 MG/100ML; MG/100ML; MG/100ML; MG/100ML
20 INJECTION, SOLUTION INTRAVENOUS CONTINUOUS
Status: DISCONTINUED | OUTPATIENT
Start: 2020-02-05 | End: 2020-02-06

## 2020-02-05 RX ORDER — DEXTROSE, SODIUM CHLORIDE, AND POTASSIUM CHLORIDE 5; .45; .15 G/100ML; G/100ML; G/100ML
100 INJECTION INTRAVENOUS CONTINUOUS
Status: DISCONTINUED | OUTPATIENT
Start: 2020-02-05 | End: 2020-02-05

## 2020-02-05 RX ADMIN — IPRATROPIUM BROMIDE 0.5 MG: 0.5 SOLUTION RESPIRATORY (INHALATION) at 05:43

## 2020-02-05 RX ADMIN — PANTOPRAZOLE SODIUM 40 MG: 40 INJECTION, POWDER, FOR SOLUTION INTRAVENOUS at 08:11

## 2020-02-05 RX ADMIN — ALTEPLASE 2 MG: 2.2 INJECTION, POWDER, LYOPHILIZED, FOR SOLUTION INTRAVENOUS at 06:29

## 2020-02-05 RX ADMIN — DIAZEPAM 2.5 MG: 10 INJECTION, SOLUTION INTRAMUSCULAR; INTRAVENOUS at 18:16

## 2020-02-05 RX ADMIN — METOCLOPRAMIDE 5 MG: 5 INJECTION, SOLUTION INTRAMUSCULAR; INTRAVENOUS at 18:06

## 2020-02-05 RX ADMIN — IPRATROPIUM BROMIDE 0.5 MG: 0.5 SOLUTION RESPIRATORY (INHALATION) at 13:38

## 2020-02-05 RX ADMIN — IPRATROPIUM BROMIDE 0.5 MG: 0.5 SOLUTION RESPIRATORY (INHALATION) at 19:51

## 2020-02-05 RX ADMIN — METOPROLOL TARTRATE 5 MG: 5 INJECTION, SOLUTION INTRAVENOUS at 11:10

## 2020-02-05 RX ADMIN — BISACODYL 10 MG: 10 SUPPOSITORY RECTAL at 08:11

## 2020-02-05 RX ADMIN — DEXTRAN 70 AND HYPROMELLOSE 2910 1 DROP: 1; 3 SOLUTION/ DROPS OPHTHALMIC at 21:44

## 2020-02-05 RX ADMIN — HEPARIN SODIUM 5000 UNITS: 5000 INJECTION INTRAVENOUS; SUBCUTANEOUS at 21:44

## 2020-02-05 RX ADMIN — LEVALBUTEROL HYDROCHLORIDE 1.25 MG: 1.25 SOLUTION, CONCENTRATE RESPIRATORY (INHALATION) at 13:38

## 2020-02-05 RX ADMIN — HEPARIN SODIUM 5000 UNITS: 5000 INJECTION INTRAVENOUS; SUBCUTANEOUS at 05:14

## 2020-02-05 RX ADMIN — LEVALBUTEROL HYDROCHLORIDE 1.25 MG: 1.25 SOLUTION, CONCENTRATE RESPIRATORY (INHALATION) at 19:51

## 2020-02-05 RX ADMIN — DEXTROSE, SODIUM CHLORIDE, AND POTASSIUM CHLORIDE 100 ML/HR: 5; .45; .15 INJECTION INTRAVENOUS at 06:23

## 2020-02-05 RX ADMIN — PANTOPRAZOLE SODIUM 40 MG: 40 INJECTION, POWDER, FOR SOLUTION INTRAVENOUS at 21:44

## 2020-02-05 RX ADMIN — THIAMINE HYDROCHLORIDE: 100 INJECTION, SOLUTION INTRAMUSCULAR; INTRAVENOUS at 21:45

## 2020-02-05 RX ADMIN — CEFAZOLIN SODIUM 1000 MG: 1 SOLUTION INTRAVENOUS at 08:11

## 2020-02-05 RX ADMIN — LEVALBUTEROL HYDROCHLORIDE 1.25 MG: 1.25 SOLUTION, CONCENTRATE RESPIRATORY (INHALATION) at 05:43

## 2020-02-05 RX ADMIN — METOPROLOL TARTRATE 5 MG: 5 INJECTION, SOLUTION INTRAVENOUS at 05:14

## 2020-02-05 RX ADMIN — HEPARIN SODIUM 5000 UNITS: 5000 INJECTION INTRAVENOUS; SUBCUTANEOUS at 13:00

## 2020-02-05 RX ADMIN — METOPROLOL TARTRATE 5 MG: 5 INJECTION, SOLUTION INTRAVENOUS at 18:06

## 2020-02-05 RX ADMIN — SODIUM CHLORIDE, SODIUM LACTATE, POTASSIUM CHLORIDE, AND CALCIUM CHLORIDE 20 ML/HR: .6; .31; .03; .02 INJECTION, SOLUTION INTRAVENOUS at 08:21

## 2020-02-05 RX ADMIN — DEXTRAN 70 AND HYPROMELLOSE 2910 1 DROP: 1; 3 SOLUTION/ DROPS OPHTHALMIC at 18:07

## 2020-02-05 RX ADMIN — CEFAZOLIN SODIUM 1000 MG: 1 SOLUTION INTRAVENOUS at 21:43

## 2020-02-05 RX ADMIN — HYDROMORPHONE HYDROCHLORIDE 0.2 MG: 1 INJECTION, SOLUTION INTRAMUSCULAR; INTRAVENOUS; SUBCUTANEOUS at 21:44

## 2020-02-05 RX ADMIN — ONDANSETRON 4 MG: 2 INJECTION INTRAMUSCULAR; INTRAVENOUS at 11:54

## 2020-02-05 RX ADMIN — DEXTRAN 70 AND HYPROMELLOSE 2910 1 DROP: 1; 3 SOLUTION/ DROPS OPHTHALMIC at 08:11

## 2020-02-05 RX ADMIN — BACITRACIN ZINC, NEOMYCIN SULFATE, AND POLYMYXIN B SULFATE 1 SMALL APPLICATION: 400; 3.5; 5 OINTMENT TOPICAL at 08:11

## 2020-02-05 NOTE — SOCIAL WORK
Patient reviewed in care coordination rounds  Patient not medically clear for d/c this week, possible early next week  Patient will remain NPO with NG tube  CM following

## 2020-02-05 NOTE — NURSING NOTE
Pt called saying she needed to be cleaned up after throwing up  Went in to assess and assist pt  While in pt room, pt vomited again  Small amount of brown, fowl smelling emesis caught in kidney basin and the rest on gown  Stomach is firm and distended on assessment  Lele with Red sx made aware  Will continue to monitor pt

## 2020-02-05 NOTE — PLAN OF CARE
Problem: Prexisting or High Potential for Compromised Skin Integrity  Goal: Skin integrity is maintained or improved  Description  INTERVENTIONS:  - Identify patients at risk for skin breakdown  - Assess and monitor skin integrity  - Assess and monitor nutrition and hydration status  - Monitor labs   - Assess for incontinence   - Turn and reposition patient  - Assist with mobility/ambulation  - Relieve pressure over bony prominences  - Avoid friction and shearing  - Provide appropriate hygiene as needed including keeping skin clean and dry  - Evaluate need for skin moisturizer/barrier cream  - Collaborate with interdisciplinary team   - Patient/family teaching  - Consider wound care consult   Outcome: Progressing     Problem: Potential for Falls  Goal: Patient will remain free of falls  Description  INTERVENTIONS:  - Assess patient frequently for physical needs  -  Identify cognitive and physical deficits and behaviors that affect risk of falls    -  [place] fall precautions as indicated by assessment   - Educate patient/family on patient safety including physical limitations  - Instruct patient to call for assistance with activity based on assessment  - Modify environment to reduce risk of injury  - Consider OT/PT consult to assist with strengthening/mobility  Outcome: Progressing     Problem: PAIN - ADULT  Goal: Verbalizes/displays adequate comfort level or baseline comfort level  Description  Interventions:  - Encourage patient to monitor pain and request assistance  - Assess pain using appropriate pain scale  - Administer analgesics based on type and severity of pain and evaluate response  - Implement non-pharmacological measures as appropriate and evaluate response  - Consider cultural and social influences on pain and pain management  - Notify physician/advanced practitioner if interventions unsuccessful or patient reports new pain  Outcome: Progressing     Problem: INFECTION - ADULT  Goal: Absence or prevention of progression during hospitalization  Description  INTERVENTIONS:  - Assess and monitor for signs and symptoms of infection  - Monitor lab/diagnostic results  - Monitor all insertion sites, i e  indwelling lines, tubes, and drains  - Monitor endotracheal if appropriate and nasal secretions for changes in amount and color  - [place] appropriate cooling/warming therapies per order  - Administer medications as ordered  - Instruct and encourage patient and family to use good hand hygiene technique  - Identify and instruct in appropriate isolation precautions for identified infection/condition  Outcome: Progressing  Goal: Absence of fever/infection during neutropenic period  Description  INTERVENTIONS:  - Monitor WBC    Outcome: Progressing     Problem: SAFETY ADULT  Goal: Patient will remain free of falls  Description  INTERVENTIONS:  - Assess patient frequently for physical needs  -  Identify cognitive and physical deficits and behaviors that affect risk of falls    -  [place] fall precautions as indicated by assessment   - Educate patient/family on patient safety including physical limitations  - Instruct patient to call for assistance with activity based on assessment  - Modify environment to reduce risk of injury  - Consider OT/PT consult to assist with strengthening/mobility  Outcome: Progressing  Goal: Maintain or return to baseline ADL function  Description  INTERVENTIONS:  -  Assess patient's ability to carry out ADLs; assess patient's baseline for ADL function and identify physical deficits which impact ability to perform ADLs (bathing, care of mouth/teeth, toileting, grooming, dressing, etc )  - Assess/evaluate cause of self-care deficits   - Assess range of motion  - Assess patient's mobility; develop plan if impaired  - Assess patient's need for assistive devices and provide as appropriate  - Encourage maximum independence but intervene and supervise when necessary  - Involve family in performance of ADLs  - Assess for home care needs following discharge   - Consider OT consult to assist with ADL evaluation and planning for discharge  - Provide patient education as appropriate  Outcome: Progressing  Goal: Maintain or return mobility status to optimal level  Description  INTERVENTIONS:  - Assess patient's baseline mobility status (ambulation, transfers, stairs, etc )    - Identify cognitive and physical deficits and behaviors that affect mobility  - Identify mobility aids required to assist with transfers and/or ambulation (gait belt, sit-to-stand, lift, walker, cane, etc )  - [place] fall precautions as indicated by assessment  - Record patient progress and toleration of activity level on Mobility SBAR; progress patient to next Phase/Stage  - Instruct patient to call for assistance with activity based on assessment  - Consider rehabilitation consult to assist with strengthening/weightbearing, etc   Outcome: Progressing     Problem: DISCHARGE PLANNING  Goal: Discharge to home or other facility with appropriate resources  Description  INTERVENTIONS:  - Identify barriers to discharge w/patient and caregiver  - Arrange for needed discharge resources and transportation as appropriate  - Identify discharge learning needs (meds, wound care, etc )  - Arrange for interpretive services to assist at discharge as needed  - Refer to Case Management Department for coordinating discharge planning if the patient needs post-hospital services based on physician/advanced practitioner order or complex needs related to functional status, cognitive ability, or social support system  Outcome: Progressing     Problem: Knowledge Deficit  Goal: Patient/family/caregiver demonstrates understanding of disease process, treatment plan, medications, and discharge instructions  Description  Complete learning assessment and assess knowledge base    Interventions:  - Provide teaching at level of understanding  - Provide teaching via preferred learning methods  Outcome: Progressing     Problem: RESPIRATORY - ADULT  Goal: Achieves optimal ventilation and oxygenation  Description  INTERVENTIONS:  - Assess for changes in respiratory status  - Assess for changes in mentation and behavior  - Position to facilitate oxygenation and minimize respiratory effort  - Oxygen administered by appropriate delivery if ordered  - Initiate smoking cessation education as indicated  - Encourage broncho-pulmonary hygiene including cough, deep breathe, Incentive Spirometry  - Assess the need for suctioning and aspirate as needed  - Assess and instruct to report SOB or any respiratory difficulty  - Respiratory Therapy support as indicated  Outcome: Progressing     Problem: GASTROINTESTINAL - ADULT  Goal: Minimal or absence of nausea and/or vomiting  Description  INTERVENTIONS:  - Administer IV fluids if ordered to ensure adequate hydration  - Maintain NPO status until nausea and vomiting are resolved  - Nasogastric tube if ordered  - Administer ordered antiemetic medications as needed  - Provide nonpharmacologic comfort measures as appropriate  - Advance diet as tolerated, if ordered  - Consider nutrition services referral to assist patient with adequate nutrition and appropriate food choices  Outcome: Progressing  Goal: Maintains or returns to baseline bowel function  Description  INTERVENTIONS:  - Assess bowel function  - Encourage oral fluids to ensure adequate hydration  - Administer IV fluids if ordered to ensure adequate hydration  - Administer ordered medications as needed  - Encourage mobilization and activity  - Consider nutritional services referral to assist patient with adequate nutrition and appropriate food choices  Outcome: Progressing  Goal: Maintains adequate nutritional intake  Description  INTERVENTIONS:  - Monitor percentage of each meal consumed  - Identify factors contributing to decreased intake, treat as appropriate  - Assist with meals as needed  - Monitor I&O, weight, and lab values if indicated  - Obtain nutrition services referral as needed  Outcome: Progressing     Problem: METABOLIC, FLUID AND ELECTROLYTES - ADULT  Goal: Electrolytes maintained within normal limits  Description  INTERVENTIONS:  - Monitor labs and assess patient for signs and symptoms of electrolyte imbalances  - Administer electrolyte replacement as ordered  - Monitor response to electrolyte replacements, including repeat lab results as appropriate  - Instruct patient on fluid and nutrition as appropriate  Outcome: Progressing  Goal: Fluid balance maintained  Description  INTERVENTIONS:  - Monitor labs   - Monitor I/O and WT  - Instruct patient on fluid and nutrition as appropriate  - Assess for signs & symptoms of volume excess or deficit  Outcome: Progressing     Problem: MUSCULOSKELETAL - ADULT  Goal: Maintain or return mobility to safest level of function  Description  INTERVENTIONS:  - Assess patient's ability to carry out ADLs; assess patient's baseline for ADL function and identify physical deficits which impact ability to perform ADLs (bathing, care of mouth/teeth, toileting, grooming, dressing, etc )  - Assess/evaluate cause of self-care deficits   - Assess range of motion  - Assess patient's mobility  - Assess patient's need for assistive devices and provide as appropriate  - Encourage maximum independence but intervene and supervise when necessary  - Involve family in performance of ADLs  - Assess for home care needs following discharge   - Consider OT consult to assist with ADL evaluation and planning for discharge  - Provide patient education as appropriate  Outcome: Progressing  Goal: Maintain proper alignment of affected body part  Description  INTERVENTIONS:  - Support, maintain and protect limb and body alignment  - Provide patient/ family with appropriate education  Outcome: Progressing     Problem: Nutrition/Hydration-ADULT  Goal: Nutrient/Hydration intake appropriate for improving, restoring or maintaining nutritional needs  Description  Monitor and assess patient's nutrition/hydration status for malnutrition  Collaborate with interdisciplinary team and initiate plan and interventions as ordered  Monitor patient's weight and dietary intake as ordered or per policy  Utilize nutrition screening tool and intervene as necessary  Determine patient's food preferences and provide high-protein, high-caloric foods as appropriate       INTERVENTIONS:  - Monitor oral intake, urinary output, labs, and treatment plans  - Assess nutrition and hydration status and recommend course of action  - Evaluate amount of meals eaten  - Assist patient with eating if necessary   - Allow adequate time for meals  - Recommend/ encourage appropriate diets, oral nutritional supplements, and vitamin/mineral supplements  - Order, calculate, and assess calorie counts as needed  - Recommend, monitor, and adjust tube feedings and TPN/PPN based on assessed needs  - Assess need for intravenous fluids  - Provide specific nutrition/hydration education as appropriate  - Include patient/family/caregiver in decisions related to nutrition  Outcome: Progressing     Problem: COPING  Goal: Pt/Family able to verbalize concerns and demonstrate effective coping strategies  Description  INTERVENTIONS:  - Assist patient/family to identify coping skills, available support systems and cultural and spiritual values  - Provide emotional support, including active listening and acknowledgement of concerns of patient and caregivers  - Reduce environmental stimuli, as able  - Provide patient education  - Assess for spiritual pain/suffering and initiate spiritual care, including notification of Pastoral Care or [place] based community as needed  - Assess effectiveness of coping strategies  Outcome: Progressing  Goal: Will report anxiety at manageable levels  Description  INTERVENTIONS:  - Administer medication as ordered  - Teach and encourage coping skills  - Provide emotional support  - Assess patient/family for anxiety and ability to cope  Outcome: Progressing     Problem: CARDIOVASCULAR - ADULT  Goal: Maintains optimal cardiac output and hemodynamic stability  Description  INTERVENTIONS:  - Monitor I/O, vital signs and rhythm  - Monitor for S/S and trends of decreased cardiac output  - Administer and titrate ordered vasoactive medications to optimize hemodynamic stability  - Assess quality of pulses, skin color and temperature  - Assess for signs of decreased coronary artery perfusion  - Instruct patient to report change in severity of symptoms  Outcome: Progressing  Goal: Absence of cardiac dysrhythmias or at baseline rhythm  Description  INTERVENTIONS:  - Continuous cardiac monitoring, vital signs, obtain 12 lead EKG if ordered  - Administer antiarrhythmic and heart rate control medications as ordered  - Monitor electrolytes and administer replacement therapy as ordered  Outcome: Progressing

## 2020-02-05 NOTE — PLAN OF CARE
Problem: Prexisting or High Potential for Compromised Skin Integrity  Goal: Skin integrity is maintained or improved  Description  INTERVENTIONS:  - Identify patients at risk for skin breakdown  - Assess and monitor skin integrity  - Assess and monitor nutrition and hydration status  - Monitor labs   - Assess for incontinence   - Turn and reposition patient  - Assist with mobility/ambulation  - Relieve pressure over bony prominences  - Avoid friction and shearing  - Provide appropriate hygiene as needed including keeping skin clean and dry  - Evaluate need for skin moisturizer/barrier cream  - Collaborate with interdisciplinary team   - Patient/family teaching  - Consider wound care consult   Outcome: Progressing     Problem: Potential for Falls  Goal: Patient will remain free of falls  Description  INTERVENTIONS:  - Assess patient frequently for physical needs  -  Identify cognitive and physical deficits and behaviors that affect risk of falls    -  [place] fall precautions as indicated by assessment   - Educate patient/family on patient safety including physical limitations  - Instruct patient to call for assistance with activity based on assessment  - Modify environment to reduce risk of injury  - Consider OT/PT consult to assist with strengthening/mobility  Outcome: Progressing     Problem: PAIN - ADULT  Goal: Verbalizes/displays adequate comfort level or baseline comfort level  Description  Interventions:  - Encourage patient to monitor pain and request assistance  - Assess pain using appropriate pain scale  - Administer analgesics based on type and severity of pain and evaluate response  - Implement non-pharmacological measures as appropriate and evaluate response  - Consider cultural and social influences on pain and pain management  - Notify physician/advanced practitioner if interventions unsuccessful or patient reports new pain  Outcome: Progressing     Problem: INFECTION - ADULT  Goal: Absence or prevention of progression during hospitalization  Description  INTERVENTIONS:  - Assess and monitor for signs and symptoms of infection  - Monitor lab/diagnostic results  - Monitor all insertion sites, i e  indwelling lines, tubes, and drains  - Monitor endotracheal if appropriate and nasal secretions for changes in amount and color  - [place] appropriate cooling/warming therapies per order  - Administer medications as ordered  - Instruct and encourage patient and family to use good hand hygiene technique  - Identify and instruct in appropriate isolation precautions for identified infection/condition  Outcome: Progressing  Goal: Absence of fever/infection during neutropenic period  Description  INTERVENTIONS:  - Monitor WBC    Outcome: Progressing     Problem: SAFETY ADULT  Goal: Patient will remain free of falls  Description  INTERVENTIONS:  - Assess patient frequently for physical needs  -  Identify cognitive and physical deficits and behaviors that affect risk of falls    -  [place] fall precautions as indicated by assessment   - Educate patient/family on patient safety including physical limitations  - Instruct patient to call for assistance with activity based on assessment  - Modify environment to reduce risk of injury  - Consider OT/PT consult to assist with strengthening/mobility  Outcome: Progressing  Goal: Maintain or return to baseline ADL function  Description  INTERVENTIONS:  -  Assess patient's ability to carry out ADLs; assess patient's baseline for ADL function and identify physical deficits which impact ability to perform ADLs (bathing, care of mouth/teeth, toileting, grooming, dressing, etc )  - Assess/evaluate cause of self-care deficits   - Assess range of motion  - Assess patient's mobility; develop plan if impaired  - Assess patient's need for assistive devices and provide as appropriate  - Encourage maximum independence but intervene and supervise when necessary  - Involve family in performance of ADLs  - Assess for home care needs following discharge   - Consider OT consult to assist with ADL evaluation and planning for discharge  - Provide patient education as appropriate  Outcome: Progressing  Goal: Maintain or return mobility status to optimal level  Description  INTERVENTIONS:  - Assess patient's baseline mobility status (ambulation, transfers, stairs, etc )    - Identify cognitive and physical deficits and behaviors that affect mobility  - Identify mobility aids required to assist with transfers and/or ambulation (gait belt, sit-to-stand, lift, walker, cane, etc )  - [place] fall precautions as indicated by assessment  - Record patient progress and toleration of activity level on Mobility SBAR; progress patient to next Phase/Stage  - Instruct patient to call for assistance with activity based on assessment  - Consider rehabilitation consult to assist with strengthening/weightbearing, etc   Outcome: Progressing     Problem: DISCHARGE PLANNING  Goal: Discharge to home or other facility with appropriate resources  Description  INTERVENTIONS:  - Identify barriers to discharge w/patient and caregiver  - Arrange for needed discharge resources and transportation as appropriate  - Identify discharge learning needs (meds, wound care, etc )  - Arrange for interpretive services to assist at discharge as needed  - Refer to Case Management Department for coordinating discharge planning if the patient needs post-hospital services based on physician/advanced practitioner order or complex needs related to functional status, cognitive ability, or social support system  Outcome: Progressing     Problem: Knowledge Deficit  Goal: Patient/family/caregiver demonstrates understanding of disease process, treatment plan, medications, and discharge instructions  Description  Complete learning assessment and assess knowledge base    Interventions:  - Provide teaching at level of understanding  - Provide teaching via preferred learning methods  Outcome: Progressing     Problem: RESPIRATORY - ADULT  Goal: Achieves optimal ventilation and oxygenation  Description  INTERVENTIONS:  - Assess for changes in respiratory status  - Assess for changes in mentation and behavior  - Position to facilitate oxygenation and minimize respiratory effort  - Oxygen administered by appropriate delivery if ordered  - Initiate smoking cessation education as indicated  - Encourage broncho-pulmonary hygiene including cough, deep breathe, Incentive Spirometry  - Assess the need for suctioning and aspirate as needed  - Assess and instruct to report SOB or any respiratory difficulty  - Respiratory Therapy support as indicated  Outcome: Progressing     Problem: GASTROINTESTINAL - ADULT  Goal: Minimal or absence of nausea and/or vomiting  Description  INTERVENTIONS:  - Administer IV fluids if ordered to ensure adequate hydration  - Maintain NPO status until nausea and vomiting are resolved  - Nasogastric tube if ordered  - Administer ordered antiemetic medications as needed  - Provide nonpharmacologic comfort measures as appropriate  - Advance diet as tolerated, if ordered  - Consider nutrition services referral to assist patient with adequate nutrition and appropriate food choices  Outcome: Progressing  Goal: Maintains or returns to baseline bowel function  Description  INTERVENTIONS:  - Assess bowel function  - Encourage oral fluids to ensure adequate hydration  - Administer IV fluids if ordered to ensure adequate hydration  - Administer ordered medications as needed  - Encourage mobilization and activity  - Consider nutritional services referral to assist patient with adequate nutrition and appropriate food choices  Outcome: Progressing  Goal: Maintains adequate nutritional intake  Description  INTERVENTIONS:  - Monitor percentage of each meal consumed  - Identify factors contributing to decreased intake, treat as appropriate  - Assist with meals as needed  - Monitor I&O, weight, and lab values if indicated  - Obtain nutrition services referral as needed  Outcome: Progressing     Problem: METABOLIC, FLUID AND ELECTROLYTES - ADULT  Goal: Electrolytes maintained within normal limits  Description  INTERVENTIONS:  - Monitor labs and assess patient for signs and symptoms of electrolyte imbalances  - Administer electrolyte replacement as ordered  - Monitor response to electrolyte replacements, including repeat lab results as appropriate  - Instruct patient on fluid and nutrition as appropriate  Outcome: Progressing  Goal: Fluid balance maintained  Description  INTERVENTIONS:  - Monitor labs   - Monitor I/O and WT  - Instruct patient on fluid and nutrition as appropriate  - Assess for signs & symptoms of volume excess or deficit  Outcome: Progressing     Problem: MUSCULOSKELETAL - ADULT  Goal: Maintain or return mobility to safest level of function  Description  INTERVENTIONS:  - Assess patient's ability to carry out ADLs; assess patient's baseline for ADL function and identify physical deficits which impact ability to perform ADLs (bathing, care of mouth/teeth, toileting, grooming, dressing, etc )  - Assess/evaluate cause of self-care deficits   - Assess range of motion  - Assess patient's mobility  - Assess patient's need for assistive devices and provide as appropriate  - Encourage maximum independence but intervene and supervise when necessary  - Involve family in performance of ADLs  - Assess for home care needs following discharge   - Consider OT consult to assist with ADL evaluation and planning for discharge  - Provide patient education as appropriate  Outcome: Progressing  Goal: Maintain proper alignment of affected body part  Description  INTERVENTIONS:  - Support, maintain and protect limb and body alignment  - Provide patient/ family with appropriate education  Outcome: Progressing     Problem: Nutrition/Hydration-ADULT  Goal: Nutrient/Hydration intake appropriate for improving, restoring or maintaining nutritional needs  Description  Monitor and assess patient's nutrition/hydration status for malnutrition  Collaborate with interdisciplinary team and initiate plan and interventions as ordered  Monitor patient's weight and dietary intake as ordered or per policy  Utilize nutrition screening tool and intervene as necessary  Determine patient's food preferences and provide high-protein, high-caloric foods as appropriate       INTERVENTIONS:  - Monitor oral intake, urinary output, labs, and treatment plans  - Assess nutrition and hydration status and recommend course of action  - Evaluate amount of meals eaten  - Assist patient with eating if necessary   - Allow adequate time for meals  - Recommend/ encourage appropriate diets, oral nutritional supplements, and vitamin/mineral supplements  - Order, calculate, and assess calorie counts as needed  - Recommend, monitor, and adjust tube feedings and TPN/PPN based on assessed needs  - Assess need for intravenous fluids  - Provide specific nutrition/hydration education as appropriate  - Include patient/family/caregiver in decisions related to nutrition  Outcome: Progressing     Problem: COPING  Goal: Pt/Family able to verbalize concerns and demonstrate effective coping strategies  Description  INTERVENTIONS:  - Assist patient/family to identify coping skills, available support systems and cultural and spiritual values  - Provide emotional support, including active listening and acknowledgement of concerns of patient and caregivers  - Reduce environmental stimuli, as able  - Provide patient education  - Assess for spiritual pain/suffering and initiate spiritual care, including notification of Pastoral Care or [place] based community as needed  - Assess effectiveness of coping strategies  Outcome: Progressing  Goal: Will report anxiety at manageable levels  Description  INTERVENTIONS:  - Administer medication as ordered  - Teach and encourage coping skills  - Provide emotional support  - Assess patient/family for anxiety and ability to cope  Outcome: Progressing     Problem: CARDIOVASCULAR - ADULT  Goal: Maintains optimal cardiac output and hemodynamic stability  Description  INTERVENTIONS:  - Monitor I/O, vital signs and rhythm  - Monitor for S/S and trends of decreased cardiac output  - Administer and titrate ordered vasoactive medications to optimize hemodynamic stability  - Assess quality of pulses, skin color and temperature  - Assess for signs of decreased coronary artery perfusion  - Instruct patient to report change in severity of symptoms  Outcome: Progressing  Goal: Absence of cardiac dysrhythmias or at baseline rhythm  Description  INTERVENTIONS:  - Continuous cardiac monitoring, vital signs, obtain 12 lead EKG if ordered  - Administer antiarrhythmic and heart rate control medications as ordered  - Monitor electrolytes and administer replacement therapy as ordered  Outcome: Progressing

## 2020-02-05 NOTE — QUICK NOTE
Nurse-Patient-Provider rounds were completed with the patient's nurse today, Deacon Dow  During rounds, the NG tube was noted to not be working appropriately and the patient was noted to be spitting up small amounts of bile-stained mucus  Attempted to trouble shoot the NG tube in noticed that there was a hole in the gastric/drainage port at the site with a some port exited near the end of the tube  The issue with the NG tube was unable to be remedied, and after discussion with Dr Dustin Segura, we decided to remove the NG tube  We discussed the plan is to remain NPO and to remain on TPN for ongoing nutritional support via the PICC line  Patient is also to remain on antibiotics at this time  Will consider motility agent such as Reglan to assist in improved gastric motility  Maintain aspiration precautions  We reviewed all of the invasive devices/lines/telemetry orders   - Maintain PICC line indefinitely for ongoing TPN administration and IV access  DVT Prophylaxis:  - Subcutaneous heparin and SCDs  Pain Assessment / Plan:  - Continue IV analgesic regimen until able to tolerate oral medications; discontinue PCA secondary to patient request and ineffective use  Mobility Assessment / Plan:  - Activity as tolerated  - Continue PT and OT evaluation and treatment as indicated  Goals / Barriers for discharge:  - Not yet appropriate for discharge while recovering from recent operative intervention and awaiting return of bowel function   - Case management following; case and discharge needs discussed  All questions and concerns were addressed  I spent greater than 29 minutes reviewing the plan with the patient and the nurse, and coordinating care for the day      Festus Perez PA-C  2/5/2020 12:09 PM

## 2020-02-05 NOTE — PROGRESS NOTES
Progress Note - General Surgery   Aminaandrew Agarwal 82 y o  female MRN: [id number]  Unit/Bed#: [facility] 802-01 Encounter: [id number]    Assessment:  83yo F with gastric outlet obstruction s/p multiple EGDs, now POD#2 s/p ex lap, antrectomy with B-II gastrojejunostomy    RUQ CATHERINE: 35 (150) serosang  NGT: 0 (0)    Plan:  · NPO/NGT  · MIVF while NPO  · PICC/TPN  · Cathflo ordered due to sluggish return on PICC  · Maintain CATHERINE, monitor output  · Pain control: will switch to PCA-d today  · F/u pathology  · F/u AM labs, replete electrolytes PRN  · DVT PPx: SQH, SCDs      Subjective/Objective     Subjective:   No acute events overnight  Patient states she had some rib discomfort while her potassium was running yesterday, continues to have intermittent sharp R-sided rib pain this morning  C/o ongoing discomfort from NG tube  +BM yesterday  Objective:    Blood pressure 115/67, pulse 91, temperature 98 3 °F (36 8 °C), resp  rate 19, height 5' (1 524 m), weight 64 7 kg (142 lb 11 2 oz), SpO2 93 %, not currently breastfeeding  ,Body mass index is 27 87 kg/m²        Intake/Output Summary (Last 24 hours) at 2/5/2020 0543  Last data filed at 2/5/2020 5139  Gross per 24 hour   Intake 3470 33 ml   Output 1291 ml   Net 2179 33 ml       Invasive Devices     Peripherally Inserted Central Catheter Line            PICC Line 02/01/20 Right Brachial 3 days          Drain            Closed/Suction Drain Right RUQ Bulb 10 Fr  1 day    External Urinary Catheter 1 day    NG/OG/Enteral Tube Nasogastric 18 Fr Right nares 1 day                Physical Exam:   Gen:  NAD  HEENT: NGT in place to wall suction  CV:  RRR  Lungs: nl effort  Abd:  soft, ND, appropriately tender, midline incision C/D/I with dressing in place, CATHERINE in place to bulb suction with serosang output   Ext:  no CCE  Skin:  no rashes  Neuro: A&Ox3     Results from last 7 days   Lab Units 02/03/20  1313 02/02/20  0624 02/01/20  0849   WBC Thousand/uL 7 33 8 80 10 86*   HEMOGLOBIN g/dL 10 5* 9 9* 10 4*   HEMATOCRIT % 34 7* 32 4* 34 9   PLATELETS Thousands/uL 298 254 252     Results from last 7 days   Lab Units 02/04/20  0524 02/03/20  1313 02/02/20  0505   POTASSIUM mmol/L 3 2* 3 7 3 5   CHLORIDE mmol/L 109* 107 106   CO2 mmol/L 23 28 30   BUN mg/dL 8 7 5   CREATININE mg/dL 0 43* 0 40* 0 40*   CALCIUM mg/dL 7 3* 8 7 8 7

## 2020-02-05 NOTE — NURSING NOTE
Sluggish blood return noted with purple lumen of  PICC  AM labs unable to be obtained at the moment  Dante Regent with red surgery made aware of sluggish PICC and AM labs being delayed  Will order Cathflo

## 2020-02-05 NOTE — PROGRESS NOTES
02/05/20 1400   Clinical Encounter Type   Visited With Patient   Congregation Encounters   Congregation Needs Prayer   Sacramental Encounters   Sacrament of Sick-Anointing Anointed

## 2020-02-05 NOTE — PROGRESS NOTES
General Cardiology Progress Note   Ida Stroud 82 y o  female MRN: [id number]  Unit/Bed#: [facility] 802-01 Encounter: [id number]      Assessment:  Principal Problem:    Gastric outlet obstruction  Active Problems:    COPD (chronic obstructive pulmonary disease) (HCC)    Colorectal polyps    Chronic low back pain    Atrial fibrillation with rapid ventricular response (HCC)    Compression fracture of L4 vertebra (HCC)    Closed wedge compression fracture of T12 vertebra (HCC)      Impression:     PAF with RVR  o Awaiting surgical clearance for anticoagulation, still has NG tube in place and not taking anything orally  o Off Cardizem drip  o Getting scheduled IV Lopressor  o Remains in regular rhythm on examination   Gastric outlet obstruction  o POD 2 ex lap, antrectomy with B-II gastrojejunostomy  o NG tube remains for gastric decompression  o She still complains of significant postoperative abdominal pain, switch to a PCA today, but states this is not helping  Plan:     Eliquis 5 mg twice daily when taking orally, and cleared by surgery   Primary team informed about her abdominal pain   She is only on metoprolol scheduled IV Q 6, remains in normal rhythm, no further changes today    Subjective:   Patient seen and examined  Off telemetry  No palpitations  Mildly short of breath  Denies orthopnea  Denies edema  Abdominal pain significant since being out of bed, switch to a PCA pump but states not working  ROS    Objective   Vitals: Blood pressure 115/70, pulse 82, temperature 98 1 °F (36 7 °C), resp  rate 17, height 5' (1 524 m), weight 64 7 kg (142 lb 11 2 oz), SpO2 93 %, not currently breastfeeding , Body mass index is 27 87 kg/m² , I/O last 3 completed shifts:   In: 3642 [I V :1108 6; IV Piggyback:150; TPN:3245 4]  Out: 8475 [Urine:2061; Drains:185]  I/O this shift:  In: 245 [I V :195; IV Piggyback:50]  Out: -   Wt Readings from Last 3 Encounters:   01/30/20 64 7 kg (142 lb 11 2 oz)   01/21/20 68 9 kg (151 lb 14 4 oz)   01/08/20 67 kg (147 lb 9 6 oz)       Intake/Output Summary (Last 24 hours) at 2/5/2020 1022  Last data filed at 2/5/2020 0845  Gross per 24 hour   Intake 3949 ml   Output 1141 ml   Net 2808 ml     I/O last 3 completed shifts: In: 1333 [I V :1108 6; IV Piggyback:150; TPN:3245 4]  Out: 1178 [Urine:2061; Drains:185]      Physical Exam   Constitutional: She is oriented to person, place, and time  No distress  HENT:   Head: Normocephalic  Eyes: Conjunctivae are normal    Neck: Normal range of motion  Neck supple  No JVD present  Cardiovascular: Regular rhythm, normal heart sounds and intact distal pulses  Exam reveals no gallop  No murmur heard  Tachycardic   Pulmonary/Chest: Effort normal  No respiratory distress  She has no wheezes  Basilar crackles bilaterally suggestive of atelectasis  Abdominal: Soft  Bowel sounds are normal  She exhibits no distension  There is no tenderness  Musculoskeletal: Normal range of motion  She exhibits no edema  Neurological: She is alert and oriented to person, place, and time  Skin: Skin is warm and dry  She is not diaphoretic         Meds/Allergies   Allergies   Allergen Reactions    Fluticasone        Current Facility-Administered Medications:     acetaminophen (TYLENOL) oral suspension 650 mg, 650 mg, Oral, Q4H PRN, Reggie Pedraza MD, 650 mg at 01/30/20 1001    Adult 3-in-1 TPN (custom base / custom electrolytes), , Intravenous, Continuous, Trenton Guo PA-C, Last Rate: 70 1 mL/hr at 02/04/20 2130    Adult 3-in-1 TPN (custom base / custom electrolytes), , Intravenous, Continuous, Samy Hidalgo MD    albuterol inhalation solution 2 5 mg, 2 5 mg, Nebulization, Q4H PRN, Reggie Pedraza MD, 2 5 mg at 01/29/20 0128    bisacodyl (DULCOLAX) rectal suppository 10 mg, 10 mg, Rectal, Daily, Reggie Pedraza MD, 10 mg at 02/05/20 4635    ceFAZolin (ANCEF) IVPB (premix) 1,000 mg, 1,000 mg, Intravenous, Q12H, Reggie Pedraza MD, Stopped at 02/05/20 0845   dextran 70-hypromellose (GENTEAL TEARS) 0 1-0 3 % ophthalmic solution 1 drop, 1 drop, Both Eyes, TID, Reggie Pedraza MD, 1 drop at 02/05/20 0811    diazepam (VALIUM) injection 2 5 mg, 2 5 mg, Intravenous, Q8H PRN, Trenton Guo PA-C, 2 5 mg at 02/04/20 1131    diltiazem (CARDIZEM) 125 mg in sodium chloride 0 9 % 125 mL infusion, 1-15 mg/hr, Intravenous, Continuous, Reggie Pedraza MD, Stopped at 01/30/20 2022    heparin (porcine) subcutaneous injection 5,000 Units, 5,000 Units, Subcutaneous, Q8H [address], Reggie Pedraza MD, 5,000 Units at 02/05/20 0514    HYDROmorphone (DILAUDID) 1 mg/mL 50 mL PCA, , Intravenous, Continuous, Luciana Lao MD    ipratropium (ATROVENT) 0 02 % inhalation solution 0 5 mg, 0 5 mg, Nebulization, TID, Reggie Pedraza MD, 0 5 mg at 02/05/20 0543    lactated ringers infusion, 20 mL/hr, Intravenous, Continuous, Jasmine Smith DO, Last Rate: 20 mL/hr at 02/05/20 0821, 20 mL/hr at 02/05/20 0145    levalbuterol [facility]) inhalation solution 1 25 mg, 1 25 mg, Nebulization, TID, Reggie Pedraza MD, 1 25 mg at 02/05/20 0543    metoprolol (LOPRESSOR) injection 5 mg, 5 mg, Intravenous, Q6H PRN, Reggie Pedraza MD, 5 mg at 01/29/20 1607    metoprolol (LOPRESSOR) injection 5 mg, 5 mg, Intravenous, Q6H, Reggie Pedraza MD, 5 mg at 02/05/20 0514    neomycin-bacitracin-polymyxin b (NEOSPORIN) ointment 1 small application, 1 small application, Topical, Daily, Reggie Pedraza MD, 1 small application at 75/99/43 0811    [facility]) injection 4 mg, 4 mg, Intravenous, Q4H PRN, Reggie Pedraza MD, 4 mg at 02/04/20 1515    pantoprazole (PROTONIX) injection 40 mg, 40 mg, Intravenous, Q12H [address], Reggie Pedraza MD, 40 mg at 02/05/20 0811    phenol (CHLORASEPTIC) 1 4 % mucosal liquid 1 spray, 1 spray, Mouth/Throat, Q2H PRN, Reggie Pedraza MD, 1 spray at 02/04/20 0948    saliva substitute (MOUTH KOTE) mucosal solution 5 spray, 5 spray, Mouth/Throat, 4x Daily PRN, Reggie Pedraza MD, 5 spray at 02/03/20 1155    [facility]) chewable tablet 80 mg, 80 mg, Oral, Q6H PRN, Moriah Youssef MD, 80 mg at 01/31/20 1608    sodium chloride (OCEAN) 0 65 % nasal spray 1 spray, 1 spray, Each Nare, Q1H PRN, Moriah Youssef MD, 1 spray at 02/02/20 1153    Laboratory Results:        CBC with diff:   Results from last 7 days   Lab Units 02/05/20  0711 02/03/20  1313 02/02/20  0624 02/01/20  0849 01/31/20  0438 01/30/20  0449   WBC Thousand/uL 13 09* 7 33 8 80 10 86* 13 85* 9 28   HEMOGLOBIN g/dL 10 6* 10 5* 9 9* 10 4* 10 6* 11 6   HEMATOCRIT % 34 8 34 7* 32 4* 34 9 35 9 38 2   MCV fL 86 85 86 86 87 86   PLATELETS Thousands/uL 254 298 254 252 298 360   MCH pg 26 1* 25 7* 26 4* 25 6* 25 7* 26 1*   MCHC g/dL 30 5* 30 3* 30 6* 29 8* 29 5* 30 4*   RDW % 15 6* 15 7* 15 9* 16 2* 16 1* 16 0*   MPV fL 9 7 9 7 9 4 9 0 9 5 9 7   NRBC AUTO /100 WBCs  --  0 0 0 0 0       CMP:  Results from last 7 days   Lab Units 02/05/20  0711 02/04/20  0524 02/03/20  1313 02/02/20  0505 02/01/20  0850 01/31/20  0825 01/31/20  0438   POTASSIUM mmol/L 4 0 3 2* 3 7 3 5 3 8 3 6 4 0   CHLORIDE mmol/L 104 109* 107 106 108 105 106   CO2 mmol/L 29 23 28 30 28 27 28   BUN mg/dL 13 8 7 5 3* 2* 2*   CREATININE mg/dL 0 38* 0 43* 0 40* 0 40* 0 44* 0 60 0 56*   CALCIUM mg/dL 8 3 7 3* 8 7 8 7 8 7 8 6 8 4   AST U/L  --   --   --  10  --  17  --    ALT U/L  --   --   --  18  --  30  --    ALK PHOS U/L  --   --   --  112  --  94  --    EGFR ml/min/1 73sq m 99 95 97 97 94 85 87       BMP:  Results from last 7 days   Lab Units 02/05/20  0711 02/04/20  0524 02/03/20  1313 02/02/20  0505 02/01/20  0850 01/31/20  0825 01/31/20  0438   POTASSIUM mmol/L 4 0 3 2* 3 7 3 5 3 8 3 6 4 0   CHLORIDE mmol/L 104 109* 107 106 108 105 106   CO2 mmol/L 29 23 28 30 28 27 28   BUN mg/dL 13 8 7 5 3* 2* 2*   CREATININE mg/dL 0 38* 0 43* 0 40* 0 40* 0 44* 0 60 0 56*   CALCIUM mg/dL 8 3 7 3* 8 7 8 7 8 7 8 6 8 4       NT-proBNP: No results for input(s): NTBNP in the last 72 hours       Magnesium:   Results from last 7 days   Lab Units 20  0711 20  0524 20  1313 20  0505 20  0825 20  0449   MAGNESIUM mg/dL 2 3 1 7 2 3 2 2 1 8 2 2       Coags:       TSH:        Hemoglobin A1C )      Lipid Profile:   Lab Results   Component Value Date    CHOL 229 2016     Lab Results   Component Value Date    HDL 42 2016     Lab Results   Component Value Date    LDLCALC 171 (H) 2016     No results found for: LDLDIRECT  Lab Results   Component Value Date    TRIG 151 (H) 2020    TRIG 79 2016       Cardiac testing:   EKG personally reviewed by Gavin Willingham MD      Results for orders placed during the hospital encounter of 10/06/19   Echo complete with contrast if indicated    Narrative [address] [place] [address]    Transthoracic Echocardiogram  2D, M-mode, Doppler, and Color Doppler    Study date:  07-Oct-2019    Patient: Ramona Hammans  MR number: [id number]  Account number: [de-identified]  : [id number]  Age: 82 years  Gender: Female  Status: Inpatient  [facility]   Height: 60 in  Weight: 164 lb  BP: 88/ 53 mmHg    Indications: Afib    Diagnoses: I48 0 - Atrial fibrillation    Sonographer:  Merceda Gaucher  Referring Physician:  Shakeel Pa MD  Group:  [facility]  Interpreting Physician:  Gibran Jensen MD    SUMMARY    LEFT VENTRICLE:  Systolic function was normal  Ejection fraction was estimated to be 60 %  There were no regional wall motion abnormalities  There was mild concentric hypertrophy  Doppler parameters were consistent with abnormal left ventricular relaxation (grade 1 diastolic dysfunction)  MITRAL VALVE:  There was mild annular calcification  There was trace regurgitation  AORTIC VALVE:  There was mild regurgitation  TRICUSPID VALVE:  There was trace regurgitation  HISTORY: PRIOR HISTORY: Congestive heart failure  Atrial fibrillation   Chronic lung disease  PROCEDURE: The study was performed in the [facility]  This was a routine study  The transthoracic approach was used  The study included complete 2D imaging, M-mode, complete spectral Doppler, and color Doppler  The  heart rate was 92 bpm, at the start of the study  Images were obtained from the parasternal, apical, subcostal, and suprasternal notch acoustic windows  Echocardiographic views were limited due to poor acoustic window availability,  decreased penetration, and lung interference  This was a technically difficult study  LEFT VENTRICLE: Size was normal  Systolic function was normal  Ejection fraction was estimated to be 60 %  There were no regional wall motion abnormalities  Wall thickness was mildly increased  There was mild concentric hypertrophy  DOPPLER: Doppler parameters were consistent with abnormal left ventricular relaxation (grade 1 diastolic dysfunction)  RIGHT VENTRICLE: The size was normal  Systolic function was normal  Wall thickness was normal     LEFT ATRIUM: Size was normal     RIGHT ATRIUM: Size was normal     MITRAL VALVE: There was mild annular calcification  Valve structure was normal  There was normal leaflet separation  DOPPLER: The transmitral velocity was within the normal range  There was no evidence for stenosis  There was trace  regurgitation  AORTIC VALVE: The valve was not well visualized  DOPPLER: Transaortic velocity was within the normal range  There was no evidence for stenosis  There was mild regurgitation  TRICUSPID VALVE: The valve structure was normal  There was normal leaflet separation  DOPPLER: The transtricuspid velocity was within the normal range  There was no evidence for stenosis  There was trace regurgitation  Pulmonary artery  systolic pressure was moderately increased  Estimated peak PA pressure was 65 mmHg  The findings suggest moderate pulmonary hypertension  PULMONIC VALVE: Not well visualized   DOPPLER: The transpulmonic velocity was within the normal range  There was no significant regurgitation  PERICARDIUM: There was no pericardial effusion  The pericardium was normal in appearance  AORTA: The root exhibited normal size  SYSTEMIC VEINS: IVC: The inferior vena cava was upper normal     SYSTEM MEASUREMENT TABLES    2D  %FS: 30 38 %  AV Diam: 3 11 cm  EDV(Teich): 61 91 ml  EF(Teich): 58 53 %  ESV(Teich): 25 68 ml  IVSd: 1 15 cm  LA Area: 16 7 cm2  LA Diam: 3 58 cm  LVEDV MOD A4C: 81 9 ml  LVEF MOD A4C: 56 03 %  LVESV MOD A4C: 36 01 ml  LVIDd: 3 8 cm  LVIDs: 2 65 cm  LVLd A4C: 6 83 cm  LVLs A4C: 5 86 cm  LVPWd: 1 12 cm  RA Area: 14 56 cm2  RV Diam : 3 27 cm  SV MOD A4C: 45 89 ml  SV(Cube): 36 32 ml  SV(Teich): 36 24 ml    CW  AR Dec [place]: 1 94 m/s2  AR Dec Time: 1840 69 ms  AR PHT: 533 8 ms  AR Vmax: 3 58 m/s  AR maxP 22 mmHg  TR Vmax: 3 49 m/s  TR maxP 16 mmHg    MM  TAPSE: 2 16 cm    PW  E': 0 07 m/s  E/E': 13 57  MV A Rodrigue: 1 44 m/s  MV Dec [place]: 2 9 m/s2  MV DecT: 337 71 ms  MV E Rodrigue: 0 98 m/s  MV E/A Ratio: 0 68    [facility] Accredited [facility]    Prepared and electronically signed by    Melina Reeves MD  Signed 07-Oct-2019 21:51:59       No results found for this or any previous visit  No results found for this or any previous visit  No results found for this or any previous visit  No results found for this or any previous visit  No results found for this or any previous visit  Army Win MD    Portions of the record may have been created with voice recognition software  Occasional wrong word or "sound a like" substitutions may have occurred due to the inherent limitations of voice recognition software  Read the chart carefully and recognize, using context, where substitutions have occurred

## 2020-02-06 ENCOUNTER — APPOINTMENT (INPATIENT)
Dept: RADIOLOGY | Facility: HOSPITAL | Age: 85
DRG: 326 | End: 2020-02-06
Payer: MEDICARE

## 2020-02-06 LAB
ERYTHROCYTE [DISTWIDTH] IN BLOOD BY AUTOMATED COUNT: 15.8 % (ref 11.6–15.1)
HCT VFR BLD AUTO: 33.5 % (ref 34.8–46.1)
HGB BLD-MCNC: 10.1 G/DL (ref 11.5–15.4)
MCH RBC QN AUTO: 25.8 PG (ref 26.8–34.3)
MCHC RBC AUTO-ENTMCNC: 30.1 G/DL (ref 31.4–37.4)
MCV RBC AUTO: 86 FL (ref 82–98)
PLATELET # BLD AUTO: 279 THOUSANDS/UL (ref 149–390)
PMV BLD AUTO: 10.2 FL (ref 8.9–12.7)
RBC # BLD AUTO: 3.91 MILLION/UL (ref 3.81–5.12)
TRIGL SERPL-MCNC: 204 MG/DL
WBC # BLD AUTO: 10.56 THOUSAND/UL (ref 4.31–10.16)

## 2020-02-06 PROCEDURE — 94640 AIRWAY INHALATION TREATMENT: CPT

## 2020-02-06 PROCEDURE — 71045 X-RAY EXAM CHEST 1 VIEW: CPT

## 2020-02-06 PROCEDURE — 94760 N-INVAS EAR/PLS OXIMETRY 1: CPT

## 2020-02-06 PROCEDURE — 74018 RADEX ABDOMEN 1 VIEW: CPT

## 2020-02-06 PROCEDURE — 85027 COMPLETE CBC AUTOMATED: CPT | Performed by: PHYSICIAN ASSISTANT

## 2020-02-06 PROCEDURE — C9113 INJ PANTOPRAZOLE SODIUM, VIA: HCPCS | Performed by: SURGERY

## 2020-02-06 PROCEDURE — 84478 ASSAY OF TRIGLYCERIDES: CPT | Performed by: STUDENT IN AN ORGANIZED HEALTH CARE EDUCATION/TRAINING PROGRAM

## 2020-02-06 PROCEDURE — 99024 POSTOP FOLLOW-UP VISIT: CPT | Performed by: SURGERY

## 2020-02-06 RX ORDER — HYDROMORPHONE HCL/PF 1 MG/ML
0.5 SYRINGE (ML) INJECTION
Status: DISCONTINUED | OUTPATIENT
Start: 2020-02-06 | End: 2020-02-07

## 2020-02-06 RX ORDER — HYDROMORPHONE HCL/PF 1 MG/ML
0.5 SYRINGE (ML) INJECTION ONCE
Status: COMPLETED | OUTPATIENT
Start: 2020-02-06 | End: 2020-02-06

## 2020-02-06 RX ORDER — LIDOCAINE HYDROCHLORIDE 20 MG/ML
JELLY TOPICAL
Status: COMPLETED
Start: 2020-02-06 | End: 2020-02-06

## 2020-02-06 RX ORDER — HYDROMORPHONE HCL/PF 1 MG/ML
SYRINGE (ML) INJECTION
Status: COMPLETED
Start: 2020-02-06 | End: 2020-02-06

## 2020-02-06 RX ORDER — HYDROMORPHONE HCL/PF 1 MG/ML
0.3 SYRINGE (ML) INJECTION
Status: DISCONTINUED | OUTPATIENT
Start: 2020-02-06 | End: 2020-02-07

## 2020-02-06 RX ORDER — LIDOCAINE HYDROCHLORIDE 20 MG/ML
1 JELLY TOPICAL ONCE
Status: COMPLETED | OUTPATIENT
Start: 2020-02-06 | End: 2020-02-06

## 2020-02-06 RX ORDER — SODIUM CHLORIDE, SODIUM LACTATE, POTASSIUM CHLORIDE, CALCIUM CHLORIDE 600; 310; 30; 20 MG/100ML; MG/100ML; MG/100ML; MG/100ML
50 INJECTION, SOLUTION INTRAVENOUS CONTINUOUS
Status: DISCONTINUED | OUTPATIENT
Start: 2020-02-06 | End: 2020-02-11

## 2020-02-06 RX ADMIN — THIAMINE HYDROCHLORIDE: 100 INJECTION, SOLUTION INTRAMUSCULAR; INTRAVENOUS at 22:10

## 2020-02-06 RX ADMIN — SODIUM CHLORIDE, SODIUM LACTATE, POTASSIUM CHLORIDE, AND CALCIUM CHLORIDE 50 ML/HR: .6; .31; .03; .02 INJECTION, SOLUTION INTRAVENOUS at 16:50

## 2020-02-06 RX ADMIN — METOCLOPRAMIDE 5 MG: 5 INJECTION, SOLUTION INTRAMUSCULAR; INTRAVENOUS at 05:58

## 2020-02-06 RX ADMIN — DEXTRAN 70 AND HYPROMELLOSE 2910 1 DROP: 1; 3 SOLUTION/ DROPS OPHTHALMIC at 16:33

## 2020-02-06 RX ADMIN — METOPROLOL TARTRATE 5 MG: 5 INJECTION, SOLUTION INTRAVENOUS at 00:09

## 2020-02-06 RX ADMIN — CEFAZOLIN SODIUM 1000 MG: 1 SOLUTION INTRAVENOUS at 08:12

## 2020-02-06 RX ADMIN — Medication 0.5 MG: at 02:29

## 2020-02-06 RX ADMIN — HYDROMORPHONE HYDROCHLORIDE 0.2 MG: 1 INJECTION, SOLUTION INTRAMUSCULAR; INTRAVENOUS; SUBCUTANEOUS at 05:58

## 2020-02-06 RX ADMIN — HYDROMORPHONE HYDROCHLORIDE 0.3 MG: 1 INJECTION, SOLUTION INTRAMUSCULAR; INTRAVENOUS; SUBCUTANEOUS at 21:46

## 2020-02-06 RX ADMIN — LEVALBUTEROL HYDROCHLORIDE 1.25 MG: 1.25 SOLUTION, CONCENTRATE RESPIRATORY (INHALATION) at 20:18

## 2020-02-06 RX ADMIN — METOPROLOL TARTRATE 5 MG: 5 INJECTION, SOLUTION INTRAVENOUS at 05:58

## 2020-02-06 RX ADMIN — CEFAZOLIN SODIUM 1000 MG: 1 SOLUTION INTRAVENOUS at 21:47

## 2020-02-06 RX ADMIN — LIDOCAINE HYDROCHLORIDE 1 APPLICATION: 20 JELLY TOPICAL at 02:31

## 2020-02-06 RX ADMIN — HYDROMORPHONE HYDROCHLORIDE 0.2 MG: 1 INJECTION, SOLUTION INTRAMUSCULAR; INTRAVENOUS; SUBCUTANEOUS at 16:33

## 2020-02-06 RX ADMIN — HYDROMORPHONE HYDROCHLORIDE 0.2 MG: 1 INJECTION, SOLUTION INTRAMUSCULAR; INTRAVENOUS; SUBCUTANEOUS at 12:32

## 2020-02-06 RX ADMIN — METOCLOPRAMIDE 5 MG: 5 INJECTION, SOLUTION INTRAMUSCULAR; INTRAVENOUS at 00:09

## 2020-02-06 RX ADMIN — DEXTRAN 70 AND HYPROMELLOSE 2910 1 DROP: 1; 3 SOLUTION/ DROPS OPHTHALMIC at 08:13

## 2020-02-06 RX ADMIN — METOPROLOL TARTRATE 5 MG: 5 INJECTION, SOLUTION INTRAVENOUS at 23:46

## 2020-02-06 RX ADMIN — METOPROLOL TARTRATE 5 MG: 5 INJECTION, SOLUTION INTRAVENOUS at 18:25

## 2020-02-06 RX ADMIN — LEVALBUTEROL HYDROCHLORIDE 1.25 MG: 1.25 SOLUTION, CONCENTRATE RESPIRATORY (INHALATION) at 09:20

## 2020-02-06 RX ADMIN — IPRATROPIUM BROMIDE 0.5 MG: 0.5 SOLUTION RESPIRATORY (INHALATION) at 20:18

## 2020-02-06 RX ADMIN — METOCLOPRAMIDE 5 MG: 5 INJECTION, SOLUTION INTRAMUSCULAR; INTRAVENOUS at 18:25

## 2020-02-06 RX ADMIN — PANTOPRAZOLE SODIUM 40 MG: 40 INJECTION, POWDER, FOR SOLUTION INTRAVENOUS at 21:46

## 2020-02-06 RX ADMIN — HYDROMORPHONE HYDROCHLORIDE 0.2 MG: 1 INJECTION, SOLUTION INTRAMUSCULAR; INTRAVENOUS; SUBCUTANEOUS at 01:15

## 2020-02-06 RX ADMIN — HYDROMORPHONE HYDROCHLORIDE 0.5 MG: 1 INJECTION, SOLUTION INTRAMUSCULAR; INTRAVENOUS; SUBCUTANEOUS at 02:29

## 2020-02-06 RX ADMIN — IPRATROPIUM BROMIDE 0.5 MG: 0.5 SOLUTION RESPIRATORY (INHALATION) at 09:20

## 2020-02-06 RX ADMIN — BISACODYL 10 MG: 10 SUPPOSITORY RECTAL at 09:11

## 2020-02-06 RX ADMIN — DIAZEPAM 2.5 MG: 10 INJECTION, SOLUTION INTRAMUSCULAR; INTRAVENOUS at 23:46

## 2020-02-06 RX ADMIN — DIAZEPAM 2.5 MG: 10 INJECTION, SOLUTION INTRAMUSCULAR; INTRAVENOUS at 08:12

## 2020-02-06 RX ADMIN — METOCLOPRAMIDE 5 MG: 5 INJECTION, SOLUTION INTRAMUSCULAR; INTRAVENOUS at 13:36

## 2020-02-06 RX ADMIN — METOCLOPRAMIDE 5 MG: 5 INJECTION, SOLUTION INTRAMUSCULAR; INTRAVENOUS at 23:46

## 2020-02-06 RX ADMIN — HEPARIN SODIUM 5000 UNITS: 5000 INJECTION INTRAVENOUS; SUBCUTANEOUS at 13:36

## 2020-02-06 RX ADMIN — BACITRACIN ZINC, NEOMYCIN SULFATE, AND POLYMYXIN B SULFATE 1 SMALL APPLICATION: 400; 3.5; 5 OINTMENT TOPICAL at 08:12

## 2020-02-06 RX ADMIN — HEPARIN SODIUM 5000 UNITS: 5000 INJECTION INTRAVENOUS; SUBCUTANEOUS at 05:58

## 2020-02-06 RX ADMIN — DEXTRAN 70 AND HYPROMELLOSE 2910 1 DROP: 1; 3 SOLUTION/ DROPS OPHTHALMIC at 21:47

## 2020-02-06 RX ADMIN — HEPARIN SODIUM 5000 UNITS: 5000 INJECTION INTRAVENOUS; SUBCUTANEOUS at 21:46

## 2020-02-06 RX ADMIN — PANTOPRAZOLE SODIUM 40 MG: 40 INJECTION, POWDER, FOR SOLUTION INTRAVENOUS at 08:12

## 2020-02-06 RX ADMIN — LEVALBUTEROL HYDROCHLORIDE 1.25 MG: 1.25 SOLUTION, CONCENTRATE RESPIRATORY (INHALATION) at 12:45

## 2020-02-06 RX ADMIN — METOPROLOL TARTRATE 5 MG: 5 INJECTION, SOLUTION INTRAVENOUS at 13:35

## 2020-02-06 RX ADMIN — IPRATROPIUM BROMIDE 0.5 MG: 0.5 SOLUTION RESPIRATORY (INHALATION) at 12:46

## 2020-02-06 NOTE — PLAN OF CARE
Problem: Prexisting or High Potential for Compromised Skin Integrity  Goal: Skin integrity is maintained or improved  Description  INTERVENTIONS:  - Identify patients at risk for skin breakdown  - Assess and monitor skin integrity  - Assess and monitor nutrition and hydration status  - Monitor labs   - Assess for incontinence   - Turn and reposition patient  - Assist with mobility/ambulation  - Relieve pressure over bony prominences  - Avoid friction and shearing  - Provide appropriate hygiene as needed including keeping skin clean and dry  - Evaluate need for skin moisturizer/barrier cream  - Collaborate with interdisciplinary team   - Patient/family teaching  - Consider wound care consult   Outcome: Progressing     Problem: Potential for Falls  Goal: Patient will remain free of falls  Description  INTERVENTIONS:  - Assess patient frequently for physical needs  -  Identify cognitive and physical deficits and behaviors that affect risk of falls    -  [place] fall precautions as indicated by assessment   - Educate patient/family on patient safety including physical limitations  - Instruct patient to call for assistance with activity based on assessment  - Modify environment to reduce risk of injury  - Consider OT/PT consult to assist with strengthening/mobility  Outcome: Progressing     Problem: PAIN - ADULT  Goal: Verbalizes/displays adequate comfort level or baseline comfort level  Description  Interventions:  - Encourage patient to monitor pain and request assistance  - Assess pain using appropriate pain scale  - Administer analgesics based on type and severity of pain and evaluate response  - Implement non-pharmacological measures as appropriate and evaluate response  - Consider cultural and social influences on pain and pain management  - Notify physician/advanced practitioner if interventions unsuccessful or patient reports new pain  Outcome: Progressing     Problem: INFECTION - ADULT  Goal: Absence or prevention of progression during hospitalization  Description  INTERVENTIONS:  - Assess and monitor for signs and symptoms of infection  - Monitor lab/diagnostic results  - Monitor all insertion sites, i e  indwelling lines, tubes, and drains  - Monitor endotracheal if appropriate and nasal secretions for changes in amount and color  - [place] appropriate cooling/warming therapies per order  - Administer medications as ordered  - Instruct and encourage patient and family to use good hand hygiene technique  - Identify and instruct in appropriate isolation precautions for identified infection/condition  Outcome: Progressing  Goal: Absence of fever/infection during neutropenic period  Description  INTERVENTIONS:  - Monitor WBC    Outcome: Progressing     Problem: SAFETY ADULT  Goal: Patient will remain free of falls  Description  INTERVENTIONS:  - Assess patient frequently for physical needs  -  Identify cognitive and physical deficits and behaviors that affect risk of falls    -  [place] fall precautions as indicated by assessment   - Educate patient/family on patient safety including physical limitations  - Instruct patient to call for assistance with activity based on assessment  - Modify environment to reduce risk of injury  - Consider OT/PT consult to assist with strengthening/mobility  Outcome: Progressing  Goal: Maintain or return to baseline ADL function  Description  INTERVENTIONS:  -  Assess patient's ability to carry out ADLs; assess patient's baseline for ADL function and identify physical deficits which impact ability to perform ADLs (bathing, care of mouth/teeth, toileting, grooming, dressing, etc )  - Assess/evaluate cause of self-care deficits   - Assess range of motion  - Assess patient's mobility; develop plan if impaired  - Assess patient's need for assistive devices and provide as appropriate  - Encourage maximum independence but intervene and supervise when necessary  - Involve family in performance of ADLs  - Assess for home care needs following discharge   - Consider OT consult to assist with ADL evaluation and planning for discharge  - Provide patient education as appropriate  Outcome: Progressing  Goal: Maintain or return mobility status to optimal level  Description  INTERVENTIONS:  - Assess patient's baseline mobility status (ambulation, transfers, stairs, etc )    - Identify cognitive and physical deficits and behaviors that affect mobility  - Identify mobility aids required to assist with transfers and/or ambulation (gait belt, sit-to-stand, lift, walker, cane, etc )  - [place] fall precautions as indicated by assessment  - Record patient progress and toleration of activity level on Mobility SBAR; progress patient to next Phase/Stage  - Instruct patient to call for assistance with activity based on assessment  - Consider rehabilitation consult to assist with strengthening/weightbearing, etc   Outcome: Progressing     Problem: DISCHARGE PLANNING  Goal: Discharge to home or other facility with appropriate resources  Description  INTERVENTIONS:  - Identify barriers to discharge w/patient and caregiver  - Arrange for needed discharge resources and transportation as appropriate  - Identify discharge learning needs (meds, wound care, etc )  - Arrange for interpretive services to assist at discharge as needed  - Refer to Case Management Department for coordinating discharge planning if the patient needs post-hospital services based on physician/advanced practitioner order or complex needs related to functional status, cognitive ability, or social support system  Outcome: Progressing     Problem: Knowledge Deficit  Goal: Patient/family/caregiver demonstrates understanding of disease process, treatment plan, medications, and discharge instructions  Description  Complete learning assessment and assess knowledge base    Interventions:  - Provide teaching at level of understanding  - Provide teaching via preferred learning methods  Outcome: Progressing     Problem: RESPIRATORY - ADULT  Goal: Achieves optimal ventilation and oxygenation  Description  INTERVENTIONS:  - Assess for changes in respiratory status  - Assess for changes in mentation and behavior  - Position to facilitate oxygenation and minimize respiratory effort  - Oxygen administered by appropriate delivery if ordered  - Initiate smoking cessation education as indicated  - Encourage broncho-pulmonary hygiene including cough, deep breathe, Incentive Spirometry  - Assess the need for suctioning and aspirate as needed  - Assess and instruct to report SOB or any respiratory difficulty  - Respiratory Therapy support as indicated  Outcome: Progressing     Problem: GASTROINTESTINAL - ADULT  Goal: Minimal or absence of nausea and/or vomiting  Description  INTERVENTIONS:  - Administer IV fluids if ordered to ensure adequate hydration  - Maintain NPO status until nausea and vomiting are resolved  - Nasogastric tube if ordered  - Administer ordered antiemetic medications as needed  - Provide nonpharmacologic comfort measures as appropriate  - Advance diet as tolerated, if ordered  - Consider nutrition services referral to assist patient with adequate nutrition and appropriate food choices  Outcome: Progressing  Goal: Maintains or returns to baseline bowel function  Description  INTERVENTIONS:  - Assess bowel function  - Encourage oral fluids to ensure adequate hydration  - Administer IV fluids if ordered to ensure adequate hydration  - Administer ordered medications as needed  - Encourage mobilization and activity  - Consider nutritional services referral to assist patient with adequate nutrition and appropriate food choices  Outcome: Progressing  Goal: Maintains adequate nutritional intake  Description  INTERVENTIONS:  - Monitor percentage of each meal consumed  - Identify factors contributing to decreased intake, treat as appropriate  - Assist with meals as needed  - Monitor I&O, weight, and lab values if indicated  - Obtain nutrition services referral as needed  Outcome: Progressing     Problem: METABOLIC, FLUID AND ELECTROLYTES - ADULT  Goal: Electrolytes maintained within normal limits  Description  INTERVENTIONS:  - Monitor labs and assess patient for signs and symptoms of electrolyte imbalances  - Administer electrolyte replacement as ordered  - Monitor response to electrolyte replacements, including repeat lab results as appropriate  - Instruct patient on fluid and nutrition as appropriate  Outcome: Progressing  Goal: Fluid balance maintained  Description  INTERVENTIONS:  - Monitor labs   - Monitor I/O and WT  - Instruct patient on fluid and nutrition as appropriate  - Assess for signs & symptoms of volume excess or deficit  Outcome: Progressing     Problem: MUSCULOSKELETAL - ADULT  Goal: Maintain or return mobility to safest level of function  Description  INTERVENTIONS:  - Assess patient's ability to carry out ADLs; assess patient's baseline for ADL function and identify physical deficits which impact ability to perform ADLs (bathing, care of mouth/teeth, toileting, grooming, dressing, etc )  - Assess/evaluate cause of self-care deficits   - Assess range of motion  - Assess patient's mobility  - Assess patient's need for assistive devices and provide as appropriate  - Encourage maximum independence but intervene and supervise when necessary  - Involve family in performance of ADLs  - Assess for home care needs following discharge   - Consider OT consult to assist with ADL evaluation and planning for discharge  - Provide patient education as appropriate  Outcome: Progressing  Goal: Maintain proper alignment of affected body part  Description  INTERVENTIONS:  - Support, maintain and protect limb and body alignment  - Provide patient/ family with appropriate education  Outcome: Progressing     Problem: Nutrition/Hydration-ADULT  Goal: Nutrient/Hydration intake appropriate for improving, restoring or maintaining nutritional needs  Description  Monitor and assess patient's nutrition/hydration status for malnutrition  Collaborate with interdisciplinary team and initiate plan and interventions as ordered  Monitor patient's weight and dietary intake as ordered or per policy  Utilize nutrition screening tool and intervene as necessary  Determine patient's food preferences and provide high-protein, high-caloric foods as appropriate       INTERVENTIONS:  - Monitor oral intake, urinary output, labs, and treatment plans  - Assess nutrition and hydration status and recommend course of action  - Evaluate amount of meals eaten  - Assist patient with eating if necessary   - Allow adequate time for meals  - Recommend/ encourage appropriate diets, oral nutritional supplements, and vitamin/mineral supplements  - Order, calculate, and assess calorie counts as needed  - Recommend, monitor, and adjust tube feedings and TPN/PPN based on assessed needs  - Assess need for intravenous fluids  - Provide specific nutrition/hydration education as appropriate  - Include patient/family/caregiver in decisions related to nutrition  Outcome: Progressing     Problem: COPING  Goal: Pt/Family able to verbalize concerns and demonstrate effective coping strategies  Description  INTERVENTIONS:  - Assist patient/family to identify coping skills, available support systems and cultural and spiritual values  - Provide emotional support, including active listening and acknowledgement of concerns of patient and caregivers  - Reduce environmental stimuli, as able  - Provide patient education  - Assess for spiritual pain/suffering and initiate spiritual care, including notification of Pastoral Care or [place] based community as needed  - Assess effectiveness of coping strategies  Outcome: Progressing  Goal: Will report anxiety at manageable levels  Description  INTERVENTIONS:  - Administer medication as ordered  - Teach and encourage coping skills  - Provide emotional support  - Assess patient/family for anxiety and ability to cope  Outcome: Progressing     Problem: CARDIOVASCULAR - ADULT  Goal: Maintains optimal cardiac output and hemodynamic stability  Description  INTERVENTIONS:  - Monitor I/O, vital signs and rhythm  - Monitor for S/S and trends of decreased cardiac output  - Administer and titrate ordered vasoactive medications to optimize hemodynamic stability  - Assess quality of pulses, skin color and temperature  - Assess for signs of decreased coronary artery perfusion  - Instruct patient to report change in severity of symptoms  Outcome: Progressing  Goal: Absence of cardiac dysrhythmias or at baseline rhythm  Description  INTERVENTIONS:  - Continuous cardiac monitoring, vital signs, obtain 12 lead EKG if ordered  - Administer antiarrhythmic and heart rate control medications as ordered  - Monitor electrolytes and administer replacement therapy as ordered  Outcome: Progressing

## 2020-02-06 NOTE — PROGRESS NOTES
Progress Note - General Surgery   Hannah Newman 82 y o  female MRN: [id number]  Unit/Bed#: [facility] 802-01 Encounter: [id number]    Assessment:  81yo F with gastric outlet obstruction s/p multiple EGDs, now POD#2 s/p ex lap, antrectomy with B-II gastrojejunostomy  RUQ CATHERINE: 30 (35) serosang  NGT: 725 (0)    Plan:  · NPO/NGT  · MIVF while NPO  · PICC/TPN  · Maintain CATHERINE, monitor output  · Analgesia PRN  · F/u pathology  · F/u AM labs, replete electrolytes PRN  · DVT PPx: SQH, SCDs      Subjective/Objective     Subjective:   NGT replaced overnight  Patient had numerous bouts of vomiting and after initial refusal agreed to have NGT placed  Xray was at bedside and tube was placed with intermittent visualization to protect her anastamosis    Objective:    Blood pressure 127/75, pulse 87, temperature 98 6 °F (37 °C), resp  rate 22, height 5' (1 524 m), weight 64 7 kg (142 lb 11 2 oz), SpO2 94 %, not currently breastfeeding  ,Body mass index is 27 87 kg/m²  Intake/Output Summary (Last 24 hours) at 2/6/2020 0601  Last data filed at 2/6/2020 0557  Gross per 24 hour   Intake 511 07 ml   Output 1455 ml   Net -943 93 ml       Invasive Devices     Peripherally Inserted Central Catheter Line            PICC Line 02/01/20 Right Brachial 4 days          Drain            Closed/Suction Drain Right RUQ Bulb 10 Fr  2 days    External Urinary Catheter 2 days    NG/OG/Enteral Tube Nasogastric Right nares less than 1 day                Physical Exam:  GEN: NAD  HEENT: MMM  NGT in place  CV: RRR  Lung: Normal effort  Ab: Soft, NT/ND  CATHERINE 30 SS  Extrem: No CCE  Neuro:  A+Ox3      Results from last 7 days   Lab Units 02/05/20  0711 02/03/20  1313 02/02/20  0624   WBC Thousand/uL 13 09* 7 33 8 80   HEMOGLOBIN g/dL 10 6* 10 5* 9 9*   HEMATOCRIT % 34 8 34 7* 32 4*   PLATELETS Thousands/uL 254 298 254     Results from last 7 days   Lab Units 02/05/20  0711 02/04/20  0524 02/03/20  1313   POTASSIUM mmol/L 4 0 3 2* 3 7   CHLORIDE mmol/L 104 109* 107   CO2 mmol/L 29 23 28   BUN mg/dL 13 8 7   CREATININE mg/dL 0 38* 0 43* 0 40*   CALCIUM mg/dL 8 3 7 3* 8 7

## 2020-02-06 NOTE — NURSING NOTE
Patient has continued to frequently spit up brown, thin liquid  Alec Appiah at the bedside  Patient educated and agreeable to NGT insertion  Patient prepped with lidocaine urojet and IV dilaudid  NGT inserted into right nare by Alec Appiah with xray at the bedside  NGT immediately began to drain thin, brown liquid  Placement confirmed with xray, read at the bedside by Dr Ilir Avila  Patient tolerated procedure well

## 2020-02-06 NOTE — PLAN OF CARE
Problem: Prexisting or High Potential for Compromised Skin Integrity  Goal: Skin integrity is maintained or improved  Description  INTERVENTIONS:  - Identify patients at risk for skin breakdown  - Assess and monitor skin integrity  - Assess and monitor nutrition and hydration status  - Monitor labs   - Assess for incontinence   - Turn and reposition patient  - Assist with mobility/ambulation  - Relieve pressure over bony prominences  - Avoid friction and shearing  - Provide appropriate hygiene as needed including keeping skin clean and dry  - Evaluate need for skin moisturizer/barrier cream  - Collaborate with interdisciplinary team   - Patient/family teaching  - Consider wound care consult   Outcome: Progressing     Problem: Potential for Falls  Goal: Patient will remain free of falls  Description  INTERVENTIONS:  - Assess patient frequently for physical needs  -  Identify cognitive and physical deficits and behaviors that affect risk of falls    -  [place] fall precautions as indicated by assessment   - Educate patient/family on patient safety including physical limitations  - Instruct patient to call for assistance with activity based on assessment  - Modify environment to reduce risk of injury  - Consider OT/PT consult to assist with strengthening/mobility  Outcome: Progressing     Problem: PAIN - ADULT  Goal: Verbalizes/displays adequate comfort level or baseline comfort level  Description  Interventions:  - Encourage patient to monitor pain and request assistance  - Assess pain using appropriate pain scale  - Administer analgesics based on type and severity of pain and evaluate response  - Implement non-pharmacological measures as appropriate and evaluate response  - Consider cultural and social influences on pain and pain management  - Notify physician/advanced practitioner if interventions unsuccessful or patient reports new pain  Outcome: Progressing     Problem: INFECTION - ADULT  Goal: Absence or prevention of progression during hospitalization  Description  INTERVENTIONS:  - Assess and monitor for signs and symptoms of infection  - Monitor lab/diagnostic results  - Monitor all insertion sites, i e  indwelling lines, tubes, and drains  - Monitor endotracheal if appropriate and nasal secretions for changes in amount and color  - [place] appropriate cooling/warming therapies per order  - Administer medications as ordered  - Instruct and encourage patient and family to use good hand hygiene technique  - Identify and instruct in appropriate isolation precautions for identified infection/condition  Outcome: Progressing  Goal: Absence of fever/infection during neutropenic period  Description  INTERVENTIONS:  - Monitor WBC    Outcome: Progressing     Problem: SAFETY ADULT  Goal: Patient will remain free of falls  Description  INTERVENTIONS:  - Assess patient frequently for physical needs  -  Identify cognitive and physical deficits and behaviors that affect risk of falls    -  [place] fall precautions as indicated by assessment   - Educate patient/family on patient safety including physical limitations  - Instruct patient to call for assistance with activity based on assessment  - Modify environment to reduce risk of injury  - Consider OT/PT consult to assist with strengthening/mobility  Outcome: Progressing  Goal: Maintain or return to baseline ADL function  Description  INTERVENTIONS:  -  Assess patient's ability to carry out ADLs; assess patient's baseline for ADL function and identify physical deficits which impact ability to perform ADLs (bathing, care of mouth/teeth, toileting, grooming, dressing, etc )  - Assess/evaluate cause of self-care deficits   - Assess range of motion  - Assess patient's mobility; develop plan if impaired  - Assess patient's need for assistive devices and provide as appropriate  - Encourage maximum independence but intervene and supervise when necessary  - Involve family in performance of ADLs  - Assess for home care needs following discharge   - Consider OT consult to assist with ADL evaluation and planning for discharge  - Provide patient education as appropriate  Outcome: Progressing  Goal: Maintain or return mobility status to optimal level  Description  INTERVENTIONS:  - Assess patient's baseline mobility status (ambulation, transfers, stairs, etc )    - Identify cognitive and physical deficits and behaviors that affect mobility  - Identify mobility aids required to assist with transfers and/or ambulation (gait belt, sit-to-stand, lift, walker, cane, etc )  - [place] fall precautions as indicated by assessment  - Record patient progress and toleration of activity level on Mobility SBAR; progress patient to next Phase/Stage  - Instruct patient to call for assistance with activity based on assessment  - Consider rehabilitation consult to assist with strengthening/weightbearing, etc   Outcome: Progressing     Problem: DISCHARGE PLANNING  Goal: Discharge to home or other facility with appropriate resources  Description  INTERVENTIONS:  - Identify barriers to discharge w/patient and caregiver  - Arrange for needed discharge resources and transportation as appropriate  - Identify discharge learning needs (meds, wound care, etc )  - Arrange for interpretive services to assist at discharge as needed  - Refer to Case Management Department for coordinating discharge planning if the patient needs post-hospital services based on physician/advanced practitioner order or complex needs related to functional status, cognitive ability, or social support system  Outcome: Progressing     Problem: Knowledge Deficit  Goal: Patient/family/caregiver demonstrates understanding of disease process, treatment plan, medications, and discharge instructions  Description  Complete learning assessment and assess knowledge base    Interventions:  - Provide teaching at level of understanding  - Provide teaching via preferred learning methods  Outcome: Progressing     Problem: RESPIRATORY - ADULT  Goal: Achieves optimal ventilation and oxygenation  Description  INTERVENTIONS:  - Assess for changes in respiratory status  - Assess for changes in mentation and behavior  - Position to facilitate oxygenation and minimize respiratory effort  - Oxygen administered by appropriate delivery if ordered  - Initiate smoking cessation education as indicated  - Encourage broncho-pulmonary hygiene including cough, deep breathe, Incentive Spirometry  - Assess the need for suctioning and aspirate as needed  - Assess and instruct to report SOB or any respiratory difficulty  - Respiratory Therapy support as indicated  Outcome: Progressing     Problem: GASTROINTESTINAL - ADULT  Goal: Minimal or absence of nausea and/or vomiting  Description  INTERVENTIONS:  - Administer IV fluids if ordered to ensure adequate hydration  - Maintain NPO status until nausea and vomiting are resolved  - Nasogastric tube if ordered  - Administer ordered antiemetic medications as needed  - Provide nonpharmacologic comfort measures as appropriate  - Advance diet as tolerated, if ordered  - Consider nutrition services referral to assist patient with adequate nutrition and appropriate food choices  Outcome: Progressing  Goal: Maintains or returns to baseline bowel function  Description  INTERVENTIONS:  - Assess bowel function  - Encourage oral fluids to ensure adequate hydration  - Administer IV fluids if ordered to ensure adequate hydration  - Administer ordered medications as needed  - Encourage mobilization and activity  - Consider nutritional services referral to assist patient with adequate nutrition and appropriate food choices  Outcome: Progressing  Goal: Maintains adequate nutritional intake  Description  INTERVENTIONS:  - Monitor percentage of each meal consumed  - Identify factors contributing to decreased intake, treat as appropriate  - Assist with meals as needed  - Monitor I&O, weight, and lab values if indicated  - Obtain nutrition services referral as needed  Outcome: Progressing     Problem: METABOLIC, FLUID AND ELECTROLYTES - ADULT  Goal: Electrolytes maintained within normal limits  Description  INTERVENTIONS:  - Monitor labs and assess patient for signs and symptoms of electrolyte imbalances  - Administer electrolyte replacement as ordered  - Monitor response to electrolyte replacements, including repeat lab results as appropriate  - Instruct patient on fluid and nutrition as appropriate  Outcome: Progressing  Goal: Fluid balance maintained  Description  INTERVENTIONS:  - Monitor labs   - Monitor I/O and WT  - Instruct patient on fluid and nutrition as appropriate  - Assess for signs & symptoms of volume excess or deficit  Outcome: Progressing     Problem: MUSCULOSKELETAL - ADULT  Goal: Maintain or return mobility to safest level of function  Description  INTERVENTIONS:  - Assess patient's ability to carry out ADLs; assess patient's baseline for ADL function and identify physical deficits which impact ability to perform ADLs (bathing, care of mouth/teeth, toileting, grooming, dressing, etc )  - Assess/evaluate cause of self-care deficits   - Assess range of motion  - Assess patient's mobility  - Assess patient's need for assistive devices and provide as appropriate  - Encourage maximum independence but intervene and supervise when necessary  - Involve family in performance of ADLs  - Assess for home care needs following discharge   - Consider OT consult to assist with ADL evaluation and planning for discharge  - Provide patient education as appropriate  Outcome: Progressing  Goal: Maintain proper alignment of affected body part  Description  INTERVENTIONS:  - Support, maintain and protect limb and body alignment  - Provide patient/ family with appropriate education  Outcome: Progressing     Problem: Nutrition/Hydration-ADULT  Goal: Nutrient/Hydration intake appropriate for improving, restoring or maintaining nutritional needs  Description  Monitor and assess patient's nutrition/hydration status for malnutrition  Collaborate with interdisciplinary team and initiate plan and interventions as ordered  Monitor patient's weight and dietary intake as ordered or per policy  Utilize nutrition screening tool and intervene as necessary  Determine patient's food preferences and provide high-protein, high-caloric foods as appropriate       INTERVENTIONS:  - Monitor oral intake, urinary output, labs, and treatment plans  - Assess nutrition and hydration status and recommend course of action  - Evaluate amount of meals eaten  - Assist patient with eating if necessary   - Allow adequate time for meals  - Recommend/ encourage appropriate diets, oral nutritional supplements, and vitamin/mineral supplements  - Order, calculate, and assess calorie counts as needed  - Recommend, monitor, and adjust tube feedings and TPN/PPN based on assessed needs  - Assess need for intravenous fluids  - Provide specific nutrition/hydration education as appropriate  - Include patient/family/caregiver in decisions related to nutrition  Outcome: Progressing     Problem: COPING  Goal: Pt/Family able to verbalize concerns and demonstrate effective coping strategies  Description  INTERVENTIONS:  - Assist patient/family to identify coping skills, available support systems and cultural and spiritual values  - Provide emotional support, including active listening and acknowledgement of concerns of patient and caregivers  - Reduce environmental stimuli, as able  - Provide patient education  - Assess for spiritual pain/suffering and initiate spiritual care, including notification of Pastoral Care or [place] based community as needed  - Assess effectiveness of coping strategies  Outcome: Progressing  Goal: Will report anxiety at manageable levels  Description  INTERVENTIONS:  - Administer medication as ordered  - Teach and encourage coping skills  - Provide emotional support  - Assess patient/family for anxiety and ability to cope  Outcome: Progressing     Problem: CARDIOVASCULAR - ADULT  Goal: Maintains optimal cardiac output and hemodynamic stability  Description  INTERVENTIONS:  - Monitor I/O, vital signs and rhythm  - Monitor for S/S and trends of decreased cardiac output  - Administer and titrate ordered vasoactive medications to optimize hemodynamic stability  - Assess quality of pulses, skin color and temperature  - Assess for signs of decreased coronary artery perfusion  - Instruct patient to report change in severity of symptoms  Outcome: Progressing  Goal: Absence of cardiac dysrhythmias or at baseline rhythm  Description  INTERVENTIONS:  - Continuous cardiac monitoring, vital signs, obtain 12 lead EKG if ordered  - Administer antiarrhythmic and heart rate control medications as ordered  - Monitor electrolytes and administer replacement therapy as ordered  Outcome: Progressing

## 2020-02-06 NOTE — NURSING NOTE
Patient complaining of nausea/vomiting, severe abdominal pain, and feeling like she cannot breathe  Patient has been spitting up brown, thin, bile every time she coughs, but has not vomited a large amount  She rates abdominal pain as 8/10; PCA pump discontinued today, but no PRN pain meds available  O2 sat 93% on 3L O2  Patient had just received scheduled breathing treatment  Mary Dow with [facility] called and made aware  Portable abdominal xray ordered; radiology dept contacted  PRN pain meds ordered; will administer  Patient repositioned in bed to prevent aspiration

## 2020-02-06 NOTE — RESTORATIVE TECHNICIAN NOTE
Restorative Specialist Mobility Note       Activity: Chair     Assistive Device: None        Repositioned: Up in chair, Sitting

## 2020-02-07 ENCOUNTER — ANESTHESIA EVENT (INPATIENT)
Dept: ANESTHESIOLOGY | Facility: HOSPITAL | Age: 85
DRG: 326 | End: 2020-02-07
Payer: MEDICARE

## 2020-02-07 ENCOUNTER — APPOINTMENT (INPATIENT)
Dept: SURGERY | Facility: HOSPITAL | Age: 85
DRG: 326 | End: 2020-02-07
Payer: MEDICARE

## 2020-02-07 ENCOUNTER — ANESTHESIA (INPATIENT)
Dept: ANESTHESIOLOGY | Facility: HOSPITAL | Age: 85
DRG: 326 | End: 2020-02-07
Payer: MEDICARE

## 2020-02-07 ENCOUNTER — APPOINTMENT (INPATIENT)
Dept: RADIOLOGY | Facility: HOSPITAL | Age: 85
DRG: 326 | End: 2020-02-07
Payer: MEDICARE

## 2020-02-07 PROCEDURE — 71045 X-RAY EXAM CHEST 1 VIEW: CPT

## 2020-02-07 PROCEDURE — 99024 POSTOP FOLLOW-UP VISIT: CPT | Performed by: SURGERY

## 2020-02-07 PROCEDURE — 99232 SBSQ HOSP IP/OBS MODERATE 35: CPT | Performed by: INTERNAL MEDICINE

## 2020-02-07 PROCEDURE — 99222 1ST HOSP IP/OBS MODERATE 55: CPT | Performed by: NURSE PRACTITIONER

## 2020-02-07 PROCEDURE — 94640 AIRWAY INHALATION TREATMENT: CPT

## 2020-02-07 PROCEDURE — C9113 INJ PANTOPRAZOLE SODIUM, VIA: HCPCS | Performed by: SURGERY

## 2020-02-07 PROCEDURE — 94760 N-INVAS EAR/PLS OXIMETRY 1: CPT

## 2020-02-07 RX ORDER — HYDROMORPHONE HCL/PF 1 MG/ML
0.3 SYRINGE (ML) INJECTION EVERY 2 HOUR PRN
Status: DISCONTINUED | OUTPATIENT
Start: 2020-02-07 | End: 2020-02-13

## 2020-02-07 RX ORDER — FENTANYL CITRATE 50 UG/ML
INJECTION, SOLUTION INTRAMUSCULAR; INTRAVENOUS
Status: COMPLETED
Start: 2020-02-07 | End: 2020-02-07

## 2020-02-07 RX ORDER — FENTANYL CITRATE 50 UG/ML
INJECTION, SOLUTION INTRAMUSCULAR; INTRAVENOUS AS NEEDED
Status: DISCONTINUED | OUTPATIENT
Start: 2020-02-07 | End: 2020-02-07 | Stop reason: HOSPADM

## 2020-02-07 RX ADMIN — METOPROLOL TARTRATE 5 MG: 5 INJECTION, SOLUTION INTRAVENOUS at 05:06

## 2020-02-07 RX ADMIN — PANTOPRAZOLE SODIUM 40 MG: 40 INJECTION, POWDER, FOR SOLUTION INTRAVENOUS at 08:27

## 2020-02-07 RX ADMIN — CEFAZOLIN SODIUM 1000 MG: 1 SOLUTION INTRAVENOUS at 22:28

## 2020-02-07 RX ADMIN — METOCLOPRAMIDE 5 MG: 5 INJECTION, SOLUTION INTRAMUSCULAR; INTRAVENOUS at 22:32

## 2020-02-07 RX ADMIN — HYDROMORPHONE HYDROCHLORIDE 0.3 MG: 1 INJECTION, SOLUTION INTRAMUSCULAR; INTRAVENOUS; SUBCUTANEOUS at 06:04

## 2020-02-07 RX ADMIN — METOCLOPRAMIDE 5 MG: 5 INJECTION, SOLUTION INTRAMUSCULAR; INTRAVENOUS at 05:06

## 2020-02-07 RX ADMIN — HEPARIN SODIUM 5000 UNITS: 5000 INJECTION INTRAVENOUS; SUBCUTANEOUS at 05:06

## 2020-02-07 RX ADMIN — LEVALBUTEROL HYDROCHLORIDE 1.25 MG: 1.25 SOLUTION, CONCENTRATE RESPIRATORY (INHALATION) at 20:17

## 2020-02-07 RX ADMIN — ALBUTEROL SULFATE 2.5 MG: 2.5 SOLUTION RESPIRATORY (INHALATION) at 16:27

## 2020-02-07 RX ADMIN — HYDROMORPHONE HYDROCHLORIDE 0.5 MG: 1 INJECTION, SOLUTION INTRAMUSCULAR; INTRAVENOUS; SUBCUTANEOUS at 08:06

## 2020-02-07 RX ADMIN — DEXTRAN 70 AND HYPROMELLOSE 2910 1 DROP: 1; 3 SOLUTION/ DROPS OPHTHALMIC at 08:35

## 2020-02-07 RX ADMIN — IPRATROPIUM BROMIDE 0.5 MG: 0.5 SOLUTION RESPIRATORY (INHALATION) at 07:25

## 2020-02-07 RX ADMIN — BACITRACIN ZINC, NEOMYCIN SULFATE, AND POLYMYXIN B SULFATE 1 SMALL APPLICATION: 400; 3.5; 5 OINTMENT TOPICAL at 08:34

## 2020-02-07 RX ADMIN — SIMETHICONE CHEW TAB 80 MG 80 MG: 80 TABLET ORAL at 08:26

## 2020-02-07 RX ADMIN — HYDROMORPHONE HYDROCHLORIDE 0.3 MG: 1 INJECTION, SOLUTION INTRAMUSCULAR; INTRAVENOUS; SUBCUTANEOUS at 10:45

## 2020-02-07 RX ADMIN — HEPARIN SODIUM 5000 UNITS: 5000 INJECTION INTRAVENOUS; SUBCUTANEOUS at 22:28

## 2020-02-07 RX ADMIN — BISACODYL 10 MG: 10 SUPPOSITORY RECTAL at 08:35

## 2020-02-07 RX ADMIN — HEPARIN SODIUM 5000 UNITS: 5000 INJECTION INTRAVENOUS; SUBCUTANEOUS at 14:29

## 2020-02-07 RX ADMIN — HYDROMORPHONE HYDROCHLORIDE 0.5 MG: 1 INJECTION, SOLUTION INTRAMUSCULAR; INTRAVENOUS; SUBCUTANEOUS at 00:20

## 2020-02-07 RX ADMIN — DEXTRAN 70 AND HYPROMELLOSE 2910 1 DROP: 1; 3 SOLUTION/ DROPS OPHTHALMIC at 22:28

## 2020-02-07 RX ADMIN — METOPROLOL TARTRATE 5 MG: 5 INJECTION, SOLUTION INTRAVENOUS at 14:29

## 2020-02-07 RX ADMIN — PANTOPRAZOLE SODIUM 40 MG: 40 INJECTION, POWDER, FOR SOLUTION INTRAVENOUS at 22:27

## 2020-02-07 RX ADMIN — FENTANYL CITRATE 100 MCG: 50 INJECTION, SOLUTION INTRAMUSCULAR; INTRAVENOUS at 13:02

## 2020-02-07 RX ADMIN — THIAMINE HYDROCHLORIDE: 100 INJECTION, SOLUTION INTRAMUSCULAR; INTRAVENOUS at 22:27

## 2020-02-07 RX ADMIN — METOCLOPRAMIDE 5 MG: 5 INJECTION, SOLUTION INTRAMUSCULAR; INTRAVENOUS at 14:29

## 2020-02-07 RX ADMIN — CEFAZOLIN SODIUM 1000 MG: 1 SOLUTION INTRAVENOUS at 08:34

## 2020-02-07 RX ADMIN — DEXTRAN 70 AND HYPROMELLOSE 2910 1 DROP: 1; 3 SOLUTION/ DROPS OPHTHALMIC at 17:25

## 2020-02-07 RX ADMIN — HYDROMORPHONE HYDROCHLORIDE 0.3 MG: 1 INJECTION, SOLUTION INTRAMUSCULAR; INTRAVENOUS; SUBCUTANEOUS at 02:26

## 2020-02-07 RX ADMIN — IPRATROPIUM BROMIDE 0.5 MG: 0.5 SOLUTION RESPIRATORY (INHALATION) at 20:17

## 2020-02-07 RX ADMIN — LEVALBUTEROL HYDROCHLORIDE 1.25 MG: 1.25 SOLUTION, CONCENTRATE RESPIRATORY (INHALATION) at 07:25

## 2020-02-07 RX ADMIN — Medication: at 14:21

## 2020-02-07 RX ADMIN — ALBUTEROL SULFATE 2.5 MG: 2.5 SOLUTION RESPIRATORY (INHALATION) at 04:29

## 2020-02-07 NOTE — PROGRESS NOTES
General Cardiology Progress Note   Jessica Scripture 82 y o  female MRN: [id number]  Unit/Bed#: [facility] 802-01 Encounter: [id number]      Assessment:  Principal Problem:    Gastric outlet obstruction  Active Problems:    COPD (chronic obstructive pulmonary disease) (HCC)    Colorectal polyps    Chronic low back pain    Atrial fibrillation with rapid ventricular response (HCC)    Compression fracture of L4 vertebra (HCC)    Closed wedge compression fracture of T12 vertebra (HCC)      Impression:     PAF with RVR  o Awaiting surgical clearance/bowel function before transitioning to Eliquis  o She is no longer on a Cardizem drip  o Continues on scheduled IV Lopressor  o Regular rhythm on examination   Gastric outlet obstruction  o POD 4 ex lap, antrectomy with B-II gastrojejunostomy  o NG tube remains in place for gastric decompression, bowels are still not started moving yet  o Continues to have some postoperative pain    Plan:     Eliquis 5 mg p o  B i d  When cleared by surgery and bowel function improves an NG tube is DC   Continue metoprolol IV, converted to home dose of metoprolol tartrate when taking p o  And NG tube removed  Subjective:   Patient seen and examined  She denies any chest pain, orthopnea  She says occasionally little bit short of breath but she believes it is due to the NG tube in her right nostril  Denies palpitations  Continues to have abdominal pain  Review of Systems   Cardiovascular: Negative for chest pain, dyspnea on exertion, irregular heartbeat, leg swelling, orthopnea and palpitations  Respiratory: Positive for shortness of breath  Gastrointestinal: Positive for abdominal pain  Objective   Vitals: Blood pressure 114/59, pulse 97, temperature 98 5 °F (36 9 °C), resp  rate 16, height 5' (1 524 m), weight 64 7 kg (142 lb 11 2 oz), SpO2 94 %, not currently breastfeeding , Body mass index is 27 87 kg/m² , I/O last 3 completed shifts:   In: 169 [I V :702; NG/GT:60]  Out: 3370 [Urine:1450; Emesis/NG output:1825; Drains:95]  No intake/output data recorded  Wt Readings from Last 3 Encounters:   01/30/20 64 7 kg (142 lb 11 2 oz)   01/21/20 68 9 kg (151 lb 14 4 oz)   01/08/20 67 kg (147 lb 9 6 oz)       Intake/Output Summary (Last 24 hours) at 2/7/2020 1103  Last data filed at 2/7/2020 0512  Gross per 24 hour   Intake 702 ml   Output 1155 ml   Net -453 ml     I/O last 3 completed shifts: In: 482 [I V :702; NG/GT:60]  Out: 3370 [Urine:1450; Emesis/NG output:1825; Drains:95]      Physical Exam   Constitutional: She is oriented to person, place, and time  No distress  HENT:   Head: Normocephalic  Eyes: Conjunctivae are normal    Neck: Normal range of motion  Neck supple  No JVD present  Cardiovascular: Normal rate, regular rhythm, normal heart sounds and intact distal pulses  Exam reveals no gallop  No murmur heard  Pulmonary/Chest: Effort normal and breath sounds normal  No respiratory distress  She has no wheezes  She has no rales  Abdominal: Soft  Bowel sounds are normal  She exhibits no distension  There is tenderness  Musculoskeletal: Normal range of motion  She exhibits no edema  Neurological: She is alert and oriented to person, place, and time  Skin: Skin is warm and dry  She is not diaphoretic         Meds/Allergies   Allergies   Allergen Reactions    Fluticasone        Current Facility-Administered Medications:     acetaminophen (TYLENOL) oral suspension 650 mg, 650 mg, Oral, Q4H PRN, Smith Louis MD, 650 mg at 01/30/20 1001    Adult 3-in-1 TPN (custom base / custom electrolytes), , Intravenous, Continuous, Albert Elena PA-C, Last Rate: 60 1 mL/hr at 02/06/20 2210    Adult 3-in-1 TPN (custom base / custom electrolytes), , Intravenous, Continuous, Jasmine Talbot DO    albuterol inhalation solution 2 5 mg, 2 5 mg, Nebulization, Q4H PRN, Smith Louis MD, 2 5 mg at 02/07/20 0559    bisacodyl (DULCOLAX) rectal suppository 10 mg, 10 mg, Rectal, Daily, Lori Castro MD, 10 mg at 02/07/20 0835    ceFAZolin (ANCEF) IVPB (premix) 1,000 mg, 1,000 mg, Intravenous, Q12H, Lori Castro MD, Last Rate: 100 mL/hr at 02/07/20 0834, 1,000 mg at 02/07/20 0834    dextran 70-hypromellose (GENTEAL TEARS) 0 1-0 3 % ophthalmic solution 1 drop, 1 drop, Both Eyes, TID, Lori Castro MD, 1 drop at 02/07/20 0835    diazepam (VALIUM) injection 2 5 mg, 2 5 mg, Intravenous, Q8H PRN, Festus Perez PA-C, 2 5 mg at 02/06/20 2346    diltiazem (CARDIZEM) 125 mg in sodium chloride 0 9 % 125 mL infusion, 1-15 mg/hr, Intravenous, Continuous, Lori Castro MD, Stopped at 01/30/20 2022    heparin (porcine) subcutaneous injection 5,000 Units, 5,000 Units, Subcutaneous, Q8H [facility], Lori Castro MD, 5,000 Units at 02/07/20 0506    HYDROmorphone (DILAUDID) injection 0 3 mg, 0 3 mg, Intravenous, Q3H PRN, Paula Patel MD, 0 3 mg at 02/07/20 1045    HYDROmorphone (DILAUDID) injection 0 5 mg, 0 5 mg, Intravenous, Q3H PRN, Paula Patel MD, 0 5 mg at 02/07/20 0806    ipratropium (ATROVENT) 0 02 % inhalation solution 0 5 mg, 0 5 mg, Nebulization, TID, Lori Castro MD, 0 5 mg at 02/07/20 0725    lactated ringers infusion, 50 mL/hr, Intravenous, Continuous, Brayan Longo MD, Last Rate: 50 mL/hr at 02/06/20 1650, 50 mL/hr at 02/06/20 1650    levalbuterol (Velora Tung) inhalation solution 1 25 mg, 1 25 mg, Nebulization, TID, Lori Castro MD, 1 25 mg at 02/07/20 0725    metoclopramide (REGLAN) injection 5 mg, 5 mg, Intravenous, Q6H [facility], Lele Bolton PA-C, 5 mg at 02/07/20 0506    metoprolol (LOPRESSOR) injection 5 mg, 5 mg, Intravenous, Q6H PRN, Lori Castro MD, 5 mg at 01/29/20 1607    metoprolol (LOPRESSOR) injection 5 mg, 5 mg, Intravenous, Q6H, Lori Castro MD, 5 mg at 02/07/20 0506    neomycin-bacitracin-polymyxin b (NEOSPORIN) ointment 1 small application, 1 small application, Topical, Daily, Lori Castro MD, 1 small application at 83/75/65 0834    ondansetron (ZOFRAN) injection 4 mg, 4 mg, Intravenous, Q4H PRN, Yolanda Lai MD, 4 mg at 02/05/20 1154    pantoprazole (PROTONIX) injection 40 mg, 40 mg, Intravenous, Q12H [address], Yolanda Lai MD, 40 mg at 02/07/20 0827    phenol (CHLORASEPTIC) 1 4 % mucosal liquid 1 spray, 1 spray, Mouth/Throat, Q2H PRN, Yolanda Lai MD, 1 spray at 02/04/20 0948    saliva substitute (MOUTH KOTE) mucosal solution 5 spray, 5 spray, Mouth/Throat, 4x Daily PRN, Yolanda Lai MD, 5 spray at 02/03/20 1155    simethicone (MYLICON) chewable tablet 80 mg, 80 mg, Oral, Q6H PRN, Yolanda Lai MD, 80 mg at 02/07/20 0826    sodium chloride (OCEAN) 0 65 % nasal spray 1 spray, 1 spray, Each Nare, Q1H PRN, Yolanda Lai MD, 1 spray at 02/02/20 1153    Laboratory Results:        CBC with diff:   Results from last 7 days   Lab Units 02/06/20  0555 02/05/20  0711 02/03/20  1313 02/02/20  0624 02/01/20  0849   WBC Thousand/uL 10 56* 13 09* 7 33 8 80 10 86*   HEMOGLOBIN g/dL 10 1* 10 6* 10 5* 9 9* 10 4*   HEMATOCRIT % 33 5* 34 8 34 7* 32 4* 34 9   MCV fL 86 86 85 86 86   PLATELETS Thousands/uL 279 254 298 254 252   MCH pg 25 8* 26 1* 25 7* 26 4* 25 6*   MCHC g/dL 30 1* 30 5* 30 3* 30 6* 29 8*   RDW % 15 8* 15 6* 15 7* 15 9* 16 2*   MPV fL 10 2 9 7 9 7 9 4 9 0   NRBC AUTO /100 WBCs  --   --  0 0 0       CMP:  Results from last 7 days   Lab Units 02/05/20  0711 02/04/20  0524 02/03/20  1313 02/02/20  0505 02/01/20  0850   POTASSIUM mmol/L 4 0 3 2* 3 7 3 5 3 8   CHLORIDE mmol/L 104 109* 107 106 108   CO2 mmol/L 29 23 28 30 28   BUN mg/dL 13 8 7 5 3*   CREATININE mg/dL 0 38* 0 43* 0 40* 0 40* 0 44*   CALCIUM mg/dL 8 3 7 3* 8 7 8 7 8 7   AST U/L  --   --   --  10  --    ALT U/L  --   --   --  18  --    ALK PHOS U/L  --   --   --  112  --    EGFR ml/min/1 73sq m 99 95 97 97 94       BMP:  Results from last 7 days   Lab Units 02/05/20  0711 02/04/20  0524 02/03/20  1313 02/02/20  0505 02/01/20  0850   POTASSIUM mmol/L 4 0 3 2* 3 7 3 5 3 8   CHLORIDE mmol/L 104 109* 107 106 108   CO2 mmol/L 29 23 28 30 28 BUN mg/dL 13 8 7 5 3*   CREATININE mg/dL 0 38* 0 43* 0 40* 0 40* 0 44*   CALCIUM mg/dL 8 3 7 3* 8 7 8 7 8 7       NT-proBNP: No results for input(s): NTBNP in the last 72 hours  Magnesium:   Results from last 7 days   Lab Units 20  0711 20  0524 20  1313 20  0505   MAGNESIUM mg/dL 2 3 1 7 2 3 2 2       Coags:       TSH:        Hemoglobin A1C )      Lipid Profile:   Lab Results   Component Value Date    CHOL 229 2016     Lab Results   Component Value Date    HDL 42 2016     Lab Results   Component Value Date    LDLCALC 171 (H) 2016     No results found for: LDLDIRECT  Lab Results   Component Value Date    TRIG 204 (H) 2020    TRIG 151 (H) 2020    TRIG 79 2016       Cardiac testing:   EKG personally reviewed by William Boyd MD      Results for orders placed during the hospital encounter of 10/06/19   Echo complete with contrast if indicated    [address]    Transthoracic Echocardiogram  2D, M-mode, Doppler, and Color Doppler    Study date:  07-Oct-2019    Patient: Margie Brock  MR number: [id number]  Account number: [de-identified]  : 1935  Age: 82 years  Gender: Female  Status: Inpatient  [facility]: [facility]   Height: 60 in  Weight: 164 lb  BP: 88/ 53 mmHg    Indications: Afib    Diagnoses: I48 0 - Atrial fibrillation    Sonographer:  Jaun Zuniga RDCS  Referring Physician:  Yusuf Sosa MD  Group:  [facility]  Interpreting Physician:  Deejay Trinh MD    SUMMARY    LEFT VENTRICLE:  Systolic function was normal  Ejection fraction was estimated to be 60 %  There were no regional wall motion abnormalities  There was mild concentric hypertrophy  Doppler parameters were consistent with abnormal left ventricular relaxation (grade 1 diastolic dysfunction)      MITRAL VALVE:  There was mild annular calcification  There was trace regurgitation  AORTIC VALVE:  There was mild regurgitation  TRICUSPID VALVE:  There was trace regurgitation  HISTORY: PRIOR HISTORY: Congestive heart failure  Atrial fibrillation  Chronic lung disease  PROCEDURE: The study was performed in the [facility]  This was a routine study  The transthoracic approach was used  The study included complete 2D imaging, M-mode, complete spectral Doppler, and color Doppler  The  heart rate was 92 bpm, at the start of the study  Images were obtained from the parasternal, apical, subcostal, and suprasternal notch acoustic windows  Echocardiographic views were limited due to poor acoustic window availability,  decreased penetration, and lung interference  This was a technically difficult study  LEFT VENTRICLE: Size was normal  Systolic function was normal  Ejection fraction was estimated to be 60 %  There were no regional wall motion abnormalities  Wall thickness was mildly increased  There was mild concentric hypertrophy  DOPPLER: Doppler parameters were consistent with abnormal left ventricular relaxation (grade 1 diastolic dysfunction)  RIGHT VENTRICLE: The size was normal  Systolic function was normal  Wall thickness was normal     LEFT ATRIUM: Size was normal     RIGHT ATRIUM: Size was normal     MITRAL VALVE: There was mild annular calcification  Valve structure was normal  There was normal leaflet separation  DOPPLER: The transmitral velocity was within the normal range  There was no evidence for stenosis  There was trace  regurgitation  AORTIC VALVE: The valve was not well visualized  DOPPLER: Transaortic velocity was within the normal range  There was no evidence for stenosis  There was mild regurgitation  TRICUSPID VALVE: The valve structure was normal  There was normal leaflet separation  DOPPLER: The transtricuspid velocity was within the normal range  There was no evidence for stenosis   There was trace regurgitation  Pulmonary artery  systolic pressure was moderately increased  Estimated peak PA pressure was 65 mmHg  The findings suggest moderate pulmonary hypertension  PULMONIC VALVE: Not well visualized  DOPPLER: The transpulmonic velocity was within the normal range  There was no significant regurgitation  PERICARDIUM: There was no pericardial effusion  The pericardium was normal in appearance  AORTA: The root exhibited normal size  SYSTEMIC VEINS: IVC: The inferior vena cava was upper normal     SYSTEM MEASUREMENT TABLES    2D  %FS: 30 38 %  AV Diam: 3 11 cm  EDV(Teich): 61 91 ml  EF(Teich): 58 53 %  ESV(Teich): 25 68 ml  IVSd: 1 15 cm  LA Area: 16 7 cm2  LA Diam: 3 58 cm  LVEDV MOD A4C: 81 9 ml  LVEF MOD A4C: 56 03 %  LVESV MOD A4C: 36 01 ml  LVIDd: 3 8 cm  LVIDs: 2 65 cm  LVLd A4C: 6 83 cm  LVLs A4C: 5 86 cm  LVPWd: 1 12 cm  RA Area: 14 56 cm2  RV Diam : 3 27 cm  SV MOD A4C: 45 89 ml  SV(Cube): 36 32 ml  SV(Teich): 36 24 ml    CW  AR Dec [place]: 1 94 m/s2  AR Dec Time: 1840 69 ms  AR PHT: 533 8 ms  AR Vmax: 3 58 m/s  AR maxP 22 mmHg  TR Vmax: 3 49 m/s  TR maxP 16 mmHg    MM  TAPSE: 2 16 cm    PW  E': 0 07 m/s  E/E': 13 57  MV A [place]: 1 44 m/s  MV Dec [place]: 2 9 m/s2  MV DecT: 337 71 ms  MV E Rodrig[place]: 0 98 m/s  MV E/A Ratio: 0 68    [employer] Accredited Echocardiography Laboratory    Prepared and electronically signed by    Mee Osuna MD  Signed 07-Oct-2019 21:51:59       No results found for this or any previous visit  No results found for this or any previous visit  No results found for this or any previous visit  No results found for this or any previous visit  No results found for this or any previous visit  Terrie Paniagua MD    Portions of the record may have been created with voice recognition software  Occasional wrong word or "sound a like" substitutions may have occurred due to the inherent limitations of voice recognition software    Read the chart carefully and recognize, using context, where substitutions have occurred

## 2020-02-07 NOTE — PLAN OF CARE
Problem: Prexisting or High Potential for Compromised Skin Integrity  Goal: Skin integrity is maintained or improved  Description  INTERVENTIONS:  - Identify patients at risk for skin breakdown  - Assess and monitor skin integrity  - Assess and monitor nutrition and hydration status  - Monitor labs   - Assess for incontinence   - Turn and reposition patient  - Assist with mobility/ambulation  - Relieve pressure over bony prominences  - Avoid friction and shearing  - Provide appropriate hygiene as needed including keeping skin clean and dry  - Evaluate need for skin moisturizer/barrier cream  - Collaborate with interdisciplinary team   - Patient/family teaching  - Consider wound care consult   Outcome: Progressing     Problem: Potential for Falls  Goal: Patient will remain free of falls  Description  INTERVENTIONS:  - Assess patient frequently for physical needs  -  Identify cognitive and physical deficits and behaviors that affect risk of falls    -  [place] fall precautions as indicated by assessment   - Educate patient/family on patient safety including physical limitations  - Instruct patient to call for assistance with activity based on assessment  - Modify environment to reduce risk of injury  - Consider OT/PT consult to assist with strengthening/mobility  Outcome: Progressing     Problem: PAIN - ADULT  Goal: Verbalizes/displays adequate comfort level or baseline comfort level  Description  Interventions:  - Encourage patient to monitor pain and request assistance  - Assess pain using appropriate pain scale  - Administer analgesics based on type and severity of pain and evaluate response  - Implement non-pharmacological measures as appropriate and evaluate response  - Consider cultural and social influences on pain and pain management  - Notify physician/advanced practitioner if interventions unsuccessful or patient reports new pain  Outcome: Progressing     Problem: INFECTION - ADULT  Goal: Absence or prevention of progression during hospitalization  Description  INTERVENTIONS:  - Assess and monitor for signs and symptoms of infection  - Monitor lab/diagnostic results  - Monitor all insertion sites, i e  indwelling lines, tubes, and drains  - Monitor endotracheal if appropriate and nasal secretions for changes in amount and color  - [place] appropriate cooling/warming therapies per order  - Administer medications as ordered  - Instruct and encourage patient and family to use good hand hygiene technique  - Identify and instruct in appropriate isolation precautions for identified infection/condition  Outcome: Progressing  Goal: Absence of fever/infection during neutropenic period  Description  INTERVENTIONS:  - Monitor WBC    Outcome: Progressing     Problem: SAFETY ADULT  Goal: Patient will remain free of falls  Description  INTERVENTIONS:  - Assess patient frequently for physical needs  -  Identify cognitive and physical deficits and behaviors that affect risk of falls    -  [place] fall precautions as indicated by assessment   - Educate patient/family on patient safety including physical limitations  - Instruct patient to call for assistance with activity based on assessment  - Modify environment to reduce risk of injury  - Consider OT/PT consult to assist with strengthening/mobility  Outcome: Progressing  Goal: Maintain or return to baseline ADL function  Description  INTERVENTIONS:  -  Assess patient's ability to carry out ADLs; assess patient's baseline for ADL function and identify physical deficits which impact ability to perform ADLs (bathing, care of mouth/teeth, toileting, grooming, dressing, etc )  - Assess/evaluate cause of self-care deficits   - Assess range of motion  - Assess patient's mobility; develop plan if impaired  - Assess patient's need for assistive devices and provide as appropriate  - Encourage maximum independence but intervene and supervise when necessary  - Involve family in performance of ADLs  - Assess for home care needs following discharge   - Consider OT consult to assist with ADL evaluation and planning for discharge  - Provide patient education as appropriate  Outcome: Progressing  Goal: Maintain or return mobility status to optimal level  Description  INTERVENTIONS:  - Assess patient's baseline mobility status (ambulation, transfers, stairs, etc )    - Identify cognitive and physical deficits and behaviors that affect mobility  - Identify mobility aids required to assist with transfers and/or ambulation (gait belt, sit-to-stand, lift, walker, cane, etc )  - Home fall precautions as indicated by assessment  - Record patient progress and toleration of activity level on Mobility SBAR; progress patient to next Phase/Stage  - Instruct patient to call for assistance with activity based on assessment  - Consider rehabilitation consult to assist with strengthening/weightbearing, etc   Outcome: Progressing     Problem: DISCHARGE PLANNING  Goal: Discharge to home or other facility with appropriate resources  Description  INTERVENTIONS:  - Identify barriers to discharge w/patient and caregiver  - Arrange for needed discharge resources and transportation as appropriate  - Identify discharge learning needs (meds, wound care, etc )  - Arrange for interpretive services to assist at discharge as needed  - Refer to Case Management Department for coordinating discharge planning if the patient needs post-hospital services based on physician/advanced practitioner order or complex needs related to functional status, cognitive ability, or social support system  Outcome: Progressing     Problem: Knowledge Deficit  Goal: Patient/family/caregiver demonstrates understanding of disease process, treatment plan, medications, and discharge instructions  Description  Complete learning assessment and assess knowledge base    Interventions:  - Provide teaching at level of understanding  - Provide teaching via preferred learning methods  Outcome: Progressing     Problem: RESPIRATORY - ADULT  Goal: Achieves optimal ventilation and oxygenation  Description  INTERVENTIONS:  - Assess for changes in respiratory status  - Assess for changes in mentation and behavior  - Position to facilitate oxygenation and minimize respiratory effort  - Oxygen administered by appropriate delivery if ordered  - Initiate smoking cessation education as indicated  - Encourage broncho-pulmonary hygiene including cough, deep breathe, Incentive Spirometry  - Assess the need for suctioning and aspirate as needed  - Assess and instruct to report SOB or any respiratory difficulty  - Respiratory Therapy support as indicated  Outcome: Progressing     Problem: GASTROINTESTINAL - ADULT  Goal: Minimal or absence of nausea and/or vomiting  Description  INTERVENTIONS:  - Administer IV fluids if ordered to ensure adequate hydration  - Maintain NPO status until nausea and vomiting are resolved  - Nasogastric tube if ordered  - Administer ordered antiemetic medications as needed  - Provide nonpharmacologic comfort measures as appropriate  - Advance diet as tolerated, if ordered  - Consider nutrition services referral to assist patient with adequate nutrition and appropriate food choices  Outcome: Progressing  Goal: Maintains or returns to baseline bowel function  Description  INTERVENTIONS:  - Assess bowel function  - Encourage oral fluids to ensure adequate hydration  - Administer IV fluids if ordered to ensure adequate hydration  - Administer ordered medications as needed  - Encourage mobilization and activity  - Consider nutritional services referral to assist patient with adequate nutrition and appropriate food choices  Outcome: Progressing  Goal: Maintains adequate nutritional intake  Description  INTERVENTIONS:  - Monitor percentage of each meal consumed  - Identify factors contributing to decreased intake, treat as appropriate  - Assist with meals as needed  - Monitor I&O, weight, and lab values if indicated  - Obtain nutrition services referral as needed  Outcome: Progressing     Problem: METABOLIC, FLUID AND ELECTROLYTES - ADULT  Goal: Electrolytes maintained within normal limits  Description  INTERVENTIONS:  - Monitor labs and assess patient for signs and symptoms of electrolyte imbalances  - Administer electrolyte replacement as ordered  - Monitor response to electrolyte replacements, including repeat lab results as appropriate  - Instruct patient on fluid and nutrition as appropriate  Outcome: Progressing  Goal: Fluid balance maintained  Description  INTERVENTIONS:  - Monitor labs   - Monitor I/O and WT  - Instruct patient on fluid and nutrition as appropriate  - Assess for signs & symptoms of volume excess or deficit  Outcome: Progressing     Problem: MUSCULOSKELETAL - ADULT  Goal: Maintain or return mobility to safest level of function  Description  INTERVENTIONS:  - Assess patient's ability to carry out ADLs; assess patient's baseline for ADL function and identify physical deficits which impact ability to perform ADLs (bathing, care of mouth/teeth, toileting, grooming, dressing, etc )  - Assess/evaluate cause of self-care deficits   - Assess range of motion  - Assess patient's mobility  - Assess patient's need for assistive devices and provide as appropriate  - Encourage maximum independence but intervene and supervise when necessary  - Involve family in performance of ADLs  - Assess for home care needs following discharge   - Consider OT consult to assist with ADL evaluation and planning for discharge  - Provide patient education as appropriate  Outcome: Progressing  Goal: Maintain proper alignment of affected body part  Description  INTERVENTIONS:  - Support, maintain and protect limb and body alignment  - Provide patient/ family with appropriate education  Outcome: Progressing     Problem: Nutrition/Hydration-ADULT  Goal: Nutrient/Hydration intake appropriate for improving, restoring or maintaining nutritional needs  Description  Monitor and assess patient's nutrition/hydration status for malnutrition  Collaborate with interdisciplinary team and initiate plan and interventions as ordered  Monitor patient's weight and dietary intake as ordered or per policy  Utilize nutrition screening tool and intervene as necessary  Determine patient's food preferences and provide high-protein, high-caloric foods as appropriate       INTERVENTIONS:  - Monitor oral intake, urinary output, labs, and treatment plans  - Assess nutrition and hydration status and recommend course of action  - Evaluate amount of meals eaten  - Assist patient with eating if necessary   - Allow adequate time for meals  - Recommend/ encourage appropriate diets, oral nutritional supplements, and vitamin/mineral supplements  - Order, calculate, and assess calorie counts as needed  - Recommend, monitor, and adjust tube feedings and TPN/PPN based on assessed needs  - Assess need for intravenous fluids  - Provide specific nutrition/hydration education as appropriate  - Include patient/family/caregiver in decisions related to nutrition  Outcome: Progressing     Problem: COPING  Goal: Pt/Family able to verbalize concerns and demonstrate effective coping strategies  Description  INTERVENTIONS:  - Assist patient/family to identify coping skills, available support systems and cultural and spiritual values  - Provide emotional support, including active listening and acknowledgement of concerns of patient and caregivers  - Reduce environmental stimuli, as able  - Provide patient education  - Assess for spiritual pain/suffering and initiate spiritual care, including notification of Pastoral Care or [place] based community as needed  - Assess effectiveness of coping strategies  Outcome: Progressing  Goal: Will report anxiety at manageable levels  Description  INTERVENTIONS:  - Administer medication as ordered  - Teach and encourage coping skills  - Provide emotional support  - Assess patient/family for anxiety and ability to cope  Outcome: Progressing     Problem: CARDIOVASCULAR - ADULT  Goal: Maintains optimal cardiac output and hemodynamic stability  Description  INTERVENTIONS:  - Monitor I/O, vital signs and rhythm  - Monitor for S/S and trends of decreased cardiac output  - Administer and titrate ordered vasoactive medications to optimize hemodynamic stability  - Assess quality of pulses, skin color and temperature  - Assess for signs of decreased coronary artery perfusion  - Instruct patient to report change in severity of symptoms  Outcome: Progressing  Goal: Absence of cardiac dysrhythmias or at baseline rhythm  Description  INTERVENTIONS:  - Continuous cardiac monitoring, vital signs, obtain 12 lead EKG if ordered  - Administer antiarrhythmic and heart rate control medications as ordered  - Monitor electrolytes and administer replacement therapy as ordered  Outcome: Progressing

## 2020-02-07 NOTE — RESPIRATORY THERAPY NOTE
Resp Care Note      02/07/20 0430   Inhalation Therapy Tx   $ Inhalation Therapy Performed Yes   $ Pulse Oximetry Spot Check Charge Completed   Pre-Treatment Pulse 94   Pre-Treatment Respirations 24   Duration 10   Breath Sounds Pre-Treatment Bilateral Coarse   Breath Sounds Post-Treatment Bilateral Coarse   Post-Treatment Pulse 94   Post-Treatment Respsirations 22   Delivery Source UDN;Oxygen   Position Semi Pappas's   Treatment Tolerance Tolerated well   Resp Comments Responded to pt asking for a PRN Rx  AccuDose system was termporarily down so had to retreive the Albuterol Unit dose from our Pharmacy before Rx could begin  Pt was talking about "18 days and still not getting any better" and "they're saying their computer system is down, do they think I was born yesterday  ,"  Attempted to explain the delay in our Rx given the system that was down (now back up)  Pt's RR lowered as the Rx progressed up to the point that she almost fell back asleep  SpO2 was > 93% upon my arrival on 2 lpm O2 and remained above that number throughout her PRN Rx     Cough Description   Sputum Amount Moderate   Sputum Color Yellow   Sputum Consistency Thick   Sputum How Obtained Spontaneous cough

## 2020-02-07 NOTE — NURSING NOTE
Pt complaining of difficulty breathing, O2 is 95% on 3L NC  Lungs sound coarse  Pt is coughing up brown, malodorous liquid  Unable to auscultate NGT  NGT has not moved, and still measures at 72 5 cm  NGT putting out very minimal thin brown liquid  Notified Jasmine osorio Red surgery  CXR ordered  Will continue to monitor

## 2020-02-07 NOTE — SOCIAL WORK
Patient reviewed in care coordination rounds  Patient not clear for d/c  Patient current NPO/NG tube  No weekend d/c

## 2020-02-07 NOTE — PROGRESS NOTES
Progress Note - General Surgery   Torrie Nascimento 82 y o  female MRN: [id number]  Unit/Bed#: [facility] 802-01 Encounter: [id number]    Assessment:  83yo F with gastric outlet obstruction s/p multiple EGDs, now POD#3 s/p ex lap, antrectomy with B-II gastrojejunostomy    NGT: 1000 (725)  CATHERINE 55 (70) serosang    Plan:  · NPO/NGT  · MIVF while NPO  · Await return of bowel function  · APS consult for continued post-op pain  · PICC/TPN  · Maintain CATHERINE, monitor output  · F/u pathology  · DVT PPx: SQH, SCDs    Subjective/Objective     Subjective: Had episode of SOB, N/V overnight, now resolved  CXR performed overnight, pending  States her abdominal pain remains poorly controlled on current analgesic regimen  Denies any SOB or nausea currently  Objective:    Blood pressure 114/59, pulse 97, temperature 98 5 °F (36 9 °C), resp  rate 16, height 5' (1 524 m), weight 64 7 kg (142 lb 11 2 oz), SpO2 92 %, not currently breastfeeding  ,Body mass index is 27 87 kg/m²        Intake/Output Summary (Last 24 hours) at 2/7/2020 0720  Last data filed at 2/7/2020 9528  Gross per 24 hour   Intake 732 ml   Output 2155 ml   Net -1423 ml       Invasive Devices     Peripherally Inserted Central Catheter Line            PICC Line 02/01/20 Right Brachial 5 days          Drain            External Urinary Catheter 4 days    Closed/Suction Drain Right RUQ Bulb 10 Fr  3 days    NG/OG/Enteral Tube Nasogastric Right nares 1 day                Physical Exam:   Gen:  NAD  HEENT: NGT in place  CV:  RRR  Lungs: nl effort on 3 L NC  Abd:  soft, tender, mildly distended, CATHERINE in place to bulb suction with serosang output   Ext:  no CCE  Neuro: A&Ox3     Results from last 7 days   Lab Units 02/06/20  0555 02/05/20  0711 02/03/20  1313   WBC Thousand/uL 10 56* 13 09* 7 33   HEMOGLOBIN g/dL 10 1* 10 6* 10 5*   HEMATOCRIT % 33 5* 34 8 34 7*   PLATELETS Thousands/uL 279 254 298     Results from last 7 days   Lab Units 02/05/20  0711 02/04/20  0524 02/03/20  1313 POTASSIUM mmol/L 4 0 3 2* 3 7   CHLORIDE mmol/L 104 109* 107   CO2 mmol/L 29 23 28   BUN mg/dL 13 8 7   CREATININE mg/dL 0 38* 0 43* 0 40*   CALCIUM mg/dL 8 3 7 3* 8 7

## 2020-02-07 NOTE — CONSULTS
Consultation - Acute Pain Service   Jessica Jones 82 y o  female MRN: [id number]  Unit/Bed#: [facility] 802-01 Encounter: [id number]               Assessment/Plan     Assessment:   Patient Active Problem List   Diagnosis    COPD (chronic obstructive pulmonary disease) ([facility] )    Cardiac disease    Diverticulosis of intestine with bleeding    Diarrhea    Colorectal polyps    Effusion of bursa of left elbow    Cardiomegaly    Chronic anxiety    Chronic cystitis    Chronic low back pain    Congestive heart failure ([facility] )    Deviated nasal septum    Diverticulosis of colon    Gastroesophageal reflux disease without esophagitis    Gout    History of angioedema    History of colonic polyps    Lumbar radiculopathy    Macular degeneration of both eyes    Obesity    Osteoporosis    Other specified forms of chronic ischemic heart disease    Seasonal allergies    Panic attack    Vocal cord dysfunction    Colitis presumed infectious    Atrial fibrillation with rapid ventricular response ([facility])    Left elbow pain    Chronic respiratory failure with hypoxia ([facility] )    Debility    Positive culture findings in sputum    Compression fracture of L4 vertebra (HCC)    Gastric outlet obstruction    Closed wedge compression fracture of T12 vertebra ([facility])        Plan:   Postoperative Pain  POD # 3 Ex lap, antrectomy with B-II gastrojejunostomy   · Continue IV Dilaudid 0 5mg IV Q3 hours PRN breakthrough pain    Interventional pain evaluation:  · DVT prophylaxis: SQ Heparin; last dose 2/7/2019 at 0506  · Platelet count 375  · PTT 28 on 1/22/2020  · PT/INR 13 7/1 09 on 1/22/2020    Patient remains NPO with NG Tube, awaiting return of bowel function, would recommend Epidural vs TAP blocks to avoid escalation in opioid therapy  Cardiology is following due to a-fib; patient was on Eliquis at home which has been on hold due to NPO status and recent surgery   Eliquis will be restarted pending return of bowel function  Case discussed with anesthesia and primary care service  APS will continue to follow; please contact APS ( btwn [phone]) with any further questions    History of Present Illness    Admit Date:  1/22/2020  Hospital Day:  16 days  Primary Service:  Surgery-General  Attending Provider:  Brittany Tadeo DO  Reason for Consult / Principal Problem:  Postoperative pain  HPI: Bobby Yip is a 82y o  year old female who is POD# 3 Ex lap, antrectomy with B-II gastrojejunostomy secondary to gastric outlet obstruction  Cardiology following for a-fib with RVR, off cardizem gtt; Eliquis on hold due to NPO status  Neuro-surg consulted for T12 and L4 compression fracture, no recent trauma; no surgical intervention anticipated  Current pain location(s):  Abdomen, back   Pain Scale:   6-10  Quality: sharp and stabbing  Current Analgesic regimen: In the last 24 hours, patient received IV Dilaudid 2 4mg and Valium 2 5mg IV  Patient resting in bed, appears restless and anxious  Reporting moderate to severe pain in her abdomen and back that is described as sharp and stabbing  PRN IV Dilaudid improves pain and lasts for about 3 hours  Discussed the option of block/epidural, patient is agreeable  Pain History: Denied history of chronic pain  I have reviewed the patient's controlled substance dispensing history in the Prescription Drug Monitoring Program in compliance with the [facility] regulations before prescribing any controlled substances  Inpatient consult to Acute Pain Service  Consult performed by: LEEROY Fuentes  Consult ordered by: Uziel Landeros MD          Review of Systems   Respiratory: Positive for shortness of breath  Gastrointestinal: Positive for abdominal pain  Musculoskeletal: Positive for back pain  Psychiatric/Behavioral: The patient is nervous/anxious          Historical Information   Past Medical History:   Diagnosis Date    Asthma     Atrial fibrillation     Blurred vision     Cardiac disease     Colitis     COPD (chronic obstructive pulmonary disease)     Diverticulosis     DJD (degenerative joint disease)     Emphysema lung     Gait abnormality      Past Surgical History:   Procedure Laterality Date    BLADDER SURGERY      COLON SURGERY      COLONOSCOPY W/ POLYPECTOMY N/A 2019    Procedure: COLONOSCOPY W/ POLYPECTOMY;  Surgeon: Yeni Bob MD;  Location: [facility] GI [facility]; Service: General    GASTROJEJUNOSTOMY W/ JEJUNOSTOMY TUBE N/A 2/3/2020    Procedure: Antrectomy with bilroth II Anastomosis;   Surgeon: Nereyda James MD;  Location: [facility];  Service: General    SMALL INTESTINE SURGERY       Social History   Social History     Substance and Sexual Activity   Alcohol Use Not Currently    Frequency: Never    Binge frequency: Never     Social History     Substance and Sexual Activity   Drug Use Not Currently     Social History     Tobacco Use   Smoking Status Former Smoker    Last attempt to quit: 2013    Years since quittin 2   Smokeless Tobacco Never Used     Family History: non-contributory    Meds/Allergies   all current active meds have been reviewed, current meds:   Current Facility-Administered Medications   Medication Dose Route Frequency    acetaminophen (TYLENOL) oral suspension 650 mg  650 mg Oral Q4H PRN    Adult 3-in-1 TPN (custom base / custom electrolytes)   Intravenous Continuous    Adult 3-in-1 TPN (custom base / custom electrolytes)   Intravenous Continuous    albuterol inhalation solution 2 5 mg  2 5 mg Nebulization Q4H PRN    bisacodyl (DULCOLAX) rectal suppository 10 mg  10 mg Rectal Daily    ceFAZolin (ANCEF) IVPB (premix) 1,000 mg  1,000 mg Intravenous Q12H    dextran 70-hypromellose (GENTEAL TEARS) 0 1-0 3 % ophthalmic solution 1 drop  1 drop Both Eyes TID    diazepam (VALIUM) injection 2 5 mg  2 5 mg Intravenous Q8H PRN    heparin (porcine) subcutaneous injection 5,000 Units  5,000 Units Subcutaneous Q8H [address]    HYDROmorphone (DILAUDID) injection 0 3 mg  0 3 mg Intravenous Q3H PRN    HYDROmorphone (DILAUDID) injection 0 5 mg  0 5 mg Intravenous Q3H PRN    ipratropium (ATROVENT) 0 02 % inhalation solution 0 5 mg  0 5 mg Nebulization TID    lactated ringers infusion  50 mL/hr Intravenous Continuous    levalbuterol (XOPENEX) inhalation solution 1 25 mg  1 25 mg Nebulization TID    metoclopramide (REGLAN) injection 5 mg  5 mg Intravenous Q6H [address]    metoprolol (LOPRESSOR) injection 5 mg  5 mg Intravenous Q6H PRN    metoprolol (LOPRESSOR) injection 5 mg  5 mg Intravenous Q6H    neomycin-bacitracin-polymyxin b (NEOSPORIN) ointment 1 small application  1 small application Topical Daily    ondansetron (ZOFRAN) injection 4 mg  4 mg Intravenous Q4H PRN    pantoprazole (PROTONIX) injection 40 mg  40 mg Intravenous Q12H [address]    phenol (CHLORASEPTIC) 1 4 % mucosal liquid 1 spray  1 spray Mouth/Throat Q2H PRN    saliva substitute (MOUTH KOTE) mucosal solution 5 spray  5 spray Mouth/Throat 4x Daily PRN    simethicone (MYLICON) chewable tablet 80 mg  80 mg Oral Q6H PRN    sodium chloride (OCEAN) 0 65 % nasal spray 1 spray  1 spray Each Nare Q1H PRN    and PTA meds:   Prior to Admission Medications   Prescriptions Last Dose Informant Patient Reported? Taking?    ALPRAZolam (XANAX) 0 5 mg tablet   Yes No   Sig: Take 0 5 mg by mouth daily at bedtime as needed   Calcium Carb-Cholecalciferol (CALCIUM 1000 + D PO)   Yes No   Sig: Take 1,000 mg by mouth daily   albuterol (PROVENTIL HFA,VENTOLIN HFA) 90 mcg/act inhaler   No No   Sig: Inhale 2 puffs every 6 (six) hours as needed for wheezing or shortness of breath   alendronate (FOSAMAX) 70 mg tablet   Yes No   Sig: Take by mouth every 7 days    apixaban (ELIQUIS) 5 mg   No No   Sig: Take 1 tablet (5 mg total) by mouth 2 (two) times a day   cholestyramine sugar free (QUESTRAN LIGHT) 4 g packet   No No   Sig: Take 1 packet (4 g total) by mouth 2 (two) times a day   diltiazem (CARDIZEM CD) 240 mg 24 hr capsule   No No   Sig: Take 1 capsule (240 mg total) by mouth daily   fluticasone-umeclidinium-vilanterol (TRELEGY ELLIPTA) 100-62 5-25 MCG/INH inhaler   Yes No   Si puff daily   ipratropium-albuterol (DUO-NEB) 0 5-2 5 mg/3 mL nebulizer solution   No No   Sig: Take 1 vial (3 mL total) by nebulization every 6 (six) hours while awake   iron polysaccharides (FERREX) 150 mg capsule   No No   Sig: Take 1 capsule (150 mg total) by mouth 2 (two) times a day   loperamide (IMODIUM) 2 mg capsule   No No   Sig: Take 1 capsule (2 mg total) by mouth 3 (three) times a day as needed for diarrhea   metoprolol tartrate (LOPRESSOR) 25 mg tablet   No No   Sig: Take 0 5 tablets (12 5 mg total) by mouth every 12 (twelve) hours   montelukast (SINGULAIR) 10 mg tablet   No No   Sig: Take 1 tablet (10 mg total) by mouth daily at bedtime   pantoprazole (PROTONIX) 40 mg tablet   No No   Sig: Take 1 tablet (40 mg total) by mouth daily in the early morning   potassium chloride (MICRO-K) 10 MEQ CR capsule   No No   Sig: Take 1 capsule (10 mEq total) by mouth daily   psyllium (METAMUCIL) packet   No No   Sig: Take 1 packet by mouth 3 (three) times a day      Facility-Administered Medications: None       Allergies   Allergen Reactions    Fluticasone        Objective   Temp:  [98 5 °F (36 9 °C)-99 1 °F (37 3 °C)] 98 5 °F (36 9 °C)  HR:  [] 97  Resp:  [16-22] 16  BP: (114-132)/(59-72) 114/59    Intake/Output Summary (Last 24 hours) at 2020 1128  Last data filed at 2020 0806  Gross per 24 hour   Intake 732 ml   Output 1155 ml   Net -423 ml       Physical Exam   Constitutional: She is oriented to person, place, and time  HENT:   Head: Normocephalic and atraumatic  Eyes: EOM are normal    Neck: Normal range of motion  Pulmonary/Chest: No respiratory distress  Abdominal: She exhibits distension     Mid abdomen incision with dressing dry and intact  NG Tube in place   Neurological: She is alert and oriented to person, place, and time  Skin: Skin is warm and dry  She is not diaphoretic  Psychiatric: Her behavior is normal  Her mood appears anxious  Her speech is rapid and/or pressured  Nursing note and vitals reviewed  Lab Results: I have personally reviewed pertinent labs  , CBC: No results found for: WBC, HGB, HCT, MCV, PLT, ADJUSTEDWBC, MCH, MCHC, RDW, MPV, NRBC, CMP: No results found for: SODIUM, K, CL, CO2, ANIONGAP, BUN, CREATININE, GLUCOSE, CALCIUM, AST, ALT, ALKPHOS, PROT, BILITOT, EGFR    Imaging Studies: I have personally reviewed pertinent reports  Counseling / Coordination of Care  Total floor / unit time spent today Level 3 = 55 minutes  Greater than 50% of total time was spent with the patient and / or family counseling and / or coordination of care  A description of the counseling / coordination of care:  Reviewed plan of care and medications with patient, RN staff, and Primary Care Service      LEEROY Yo  Acute Pain Service

## 2020-02-07 NOTE — NURSING NOTE
Vitals stable , removed from monitors by chinmay acosta rn, ready for transport  Cheryl Gore Please visit www [employer] for a medication discount voucher   moe kearney

## 2020-02-07 NOTE — ANESTHESIA PROCEDURE NOTES
Epidural Block    Patient location during procedure: pre-op  Start time: 2/7/2020 1:30 PM  Reason for block: procedure for pain and at surgeon's request  Staffing  Anesthesiologist: Minoo Victoria MD  Performed: anesthesiologist   Preanesthetic Checklist  Completed: patient identified, site marked, surgical consent, pre-op evaluation, timeout performed, IV checked, risks and benefits discussed and monitors and equipment checked  Epidural  Patient position: sitting  Prep: ChloraPrep  Patient monitoring: continuous pulse ox  Approach: midline  Location: thoracic (1-12)  Injection technique: SHERRY saline  Needle  Needle type: Tuohy   Needle gauge: 18 G  Catheter type: end hole  Catheter size: 18 G  Catheter at skin depth: 12 cm  Test dose: negative  Assessment  Sensory level: [id number] aspiration for CSF, negative aspiration for heme and no paresthesia on injection  patient tolerated the procedure well with no immediate complications

## 2020-02-08 ENCOUNTER — APPOINTMENT (INPATIENT)
Dept: RADIOLOGY | Facility: HOSPITAL | Age: 85
DRG: 326 | End: 2020-02-08
Payer: MEDICARE

## 2020-02-08 LAB
ANION GAP SERPL CALCULATED.3IONS-SCNC: 7 MMOL/L (ref 4–13)
BUN SERPL-MCNC: 11 MG/DL (ref 5–25)
CALCIUM SERPL-MCNC: 8.7 MG/DL (ref 8.3–10.1)
CHLORIDE SERPL-SCNC: 105 MMOL/L (ref 100–108)
CO2 SERPL-SCNC: 26 MMOL/L (ref 21–32)
CREAT SERPL-MCNC: 0.39 MG/DL (ref 0.6–1.3)
GFR SERPL CREATININE-BSD FRML MDRD: 98 ML/MIN/1.73SQ M
GLUCOSE SERPL-MCNC: 127 MG/DL (ref 65–140)
MAGNESIUM SERPL-MCNC: 2.2 MG/DL (ref 1.6–2.6)
PHOSPHATE SERPL-MCNC: 4.5 MG/DL (ref 2.3–4.1)
POTASSIUM SERPL-SCNC: 4.1 MMOL/L (ref 3.5–5.3)
SODIUM SERPL-SCNC: 138 MMOL/L (ref 136–145)

## 2020-02-08 PROCEDURE — 80048 BASIC METABOLIC PNL TOTAL CA: CPT | Performed by: STUDENT IN AN ORGANIZED HEALTH CARE EDUCATION/TRAINING PROGRAM

## 2020-02-08 PROCEDURE — 74220 X-RAY XM ESOPHAGUS 1CNTRST: CPT

## 2020-02-08 PROCEDURE — 94760 N-INVAS EAR/PLS OXIMETRY 1: CPT

## 2020-02-08 PROCEDURE — 94640 AIRWAY INHALATION TREATMENT: CPT

## 2020-02-08 PROCEDURE — 83735 ASSAY OF MAGNESIUM: CPT | Performed by: STUDENT IN AN ORGANIZED HEALTH CARE EDUCATION/TRAINING PROGRAM

## 2020-02-08 PROCEDURE — 84100 ASSAY OF PHOSPHORUS: CPT | Performed by: STUDENT IN AN ORGANIZED HEALTH CARE EDUCATION/TRAINING PROGRAM

## 2020-02-08 PROCEDURE — C9113 INJ PANTOPRAZOLE SODIUM, VIA: HCPCS | Performed by: SURGERY

## 2020-02-08 PROCEDURE — 99024 POSTOP FOLLOW-UP VISIT: CPT | Performed by: SURGERY

## 2020-02-08 RX ORDER — ALBUTEROL SULFATE 90 UG/1
2 AEROSOL, METERED RESPIRATORY (INHALATION) EVERY 4 HOURS PRN
Status: DISCONTINUED | OUTPATIENT
Start: 2020-02-08 | End: 2020-02-14 | Stop reason: HOSPADM

## 2020-02-08 RX ADMIN — METOCLOPRAMIDE 5 MG: 5 INJECTION, SOLUTION INTRAMUSCULAR; INTRAVENOUS at 08:30

## 2020-02-08 RX ADMIN — HEPARIN SODIUM 5000 UNITS: 5000 INJECTION INTRAVENOUS; SUBCUTANEOUS at 13:15

## 2020-02-08 RX ADMIN — METOPROLOL TARTRATE 5 MG: 5 INJECTION, SOLUTION INTRAVENOUS at 21:58

## 2020-02-08 RX ADMIN — IOHEXOL 60 ML: 350 INJECTION, SOLUTION INTRAVENOUS at 12:48

## 2020-02-08 RX ADMIN — Medication: at 05:10

## 2020-02-08 RX ADMIN — METOCLOPRAMIDE 5 MG: 5 INJECTION, SOLUTION INTRAMUSCULAR; INTRAVENOUS at 15:55

## 2020-02-08 RX ADMIN — IPRATROPIUM BROMIDE 0.5 MG: 0.5 SOLUTION RESPIRATORY (INHALATION) at 07:25

## 2020-02-08 RX ADMIN — IPRATROPIUM BROMIDE 0.5 MG: 0.5 SOLUTION RESPIRATORY (INHALATION) at 20:23

## 2020-02-08 RX ADMIN — METOPROLOL TARTRATE 5 MG: 5 INJECTION, SOLUTION INTRAVENOUS at 08:30

## 2020-02-08 RX ADMIN — HEPARIN SODIUM 5000 UNITS: 5000 INJECTION INTRAVENOUS; SUBCUTANEOUS at 21:57

## 2020-02-08 RX ADMIN — PANTOPRAZOLE SODIUM 40 MG: 40 INJECTION, POWDER, FOR SOLUTION INTRAVENOUS at 08:30

## 2020-02-08 RX ADMIN — METOCLOPRAMIDE 5 MG: 5 INJECTION, SOLUTION INTRAMUSCULAR; INTRAVENOUS at 03:35

## 2020-02-08 RX ADMIN — ALBUTEROL SULFATE 2.5 MG: 2.5 SOLUTION RESPIRATORY (INHALATION) at 03:01

## 2020-02-08 RX ADMIN — Medication: at 18:37

## 2020-02-08 RX ADMIN — LEVALBUTEROL HYDROCHLORIDE 1.25 MG: 1.25 SOLUTION, CONCENTRATE RESPIRATORY (INHALATION) at 07:25

## 2020-02-08 RX ADMIN — BACITRACIN ZINC, NEOMYCIN SULFATE, AND POLYMYXIN B SULFATE 1 SMALL APPLICATION: 400; 3.5; 5 OINTMENT TOPICAL at 08:30

## 2020-02-08 RX ADMIN — LEVALBUTEROL HYDROCHLORIDE 1.25 MG: 1.25 SOLUTION, CONCENTRATE RESPIRATORY (INHALATION) at 13:24

## 2020-02-08 RX ADMIN — CEFAZOLIN SODIUM 1000 MG: 1 SOLUTION INTRAVENOUS at 08:30

## 2020-02-08 RX ADMIN — PANTOPRAZOLE SODIUM 40 MG: 40 INJECTION, POWDER, FOR SOLUTION INTRAVENOUS at 21:55

## 2020-02-08 RX ADMIN — METOCLOPRAMIDE 5 MG: 5 INJECTION, SOLUTION INTRAMUSCULAR; INTRAVENOUS at 21:55

## 2020-02-08 RX ADMIN — DEXTRAN 70 AND HYPROMELLOSE 2910 1 DROP: 1; 3 SOLUTION/ DROPS OPHTHALMIC at 08:31

## 2020-02-08 RX ADMIN — BISACODYL 10 MG: 10 SUPPOSITORY RECTAL at 17:42

## 2020-02-08 RX ADMIN — HEPARIN SODIUM 5000 UNITS: 5000 INJECTION INTRAVENOUS; SUBCUTANEOUS at 05:34

## 2020-02-08 RX ADMIN — METOPROLOL TARTRATE 5 MG: 5 INJECTION, SOLUTION INTRAVENOUS at 15:54

## 2020-02-08 RX ADMIN — CEFAZOLIN SODIUM 1000 MG: 1 SOLUTION INTRAVENOUS at 21:57

## 2020-02-08 RX ADMIN — LEVALBUTEROL HYDROCHLORIDE 1.25 MG: 1.25 SOLUTION, CONCENTRATE RESPIRATORY (INHALATION) at 20:23

## 2020-02-08 RX ADMIN — SODIUM CHLORIDE, SODIUM LACTATE, POTASSIUM CHLORIDE, AND CALCIUM CHLORIDE 50 ML/HR: .6; .31; .03; .02 INJECTION, SOLUTION INTRAVENOUS at 11:39

## 2020-02-08 RX ADMIN — DEXTRAN 70 AND HYPROMELLOSE 2910 1 DROP: 1; 3 SOLUTION/ DROPS OPHTHALMIC at 21:59

## 2020-02-08 RX ADMIN — THIAMINE HYDROCHLORIDE: 100 INJECTION, SOLUTION INTRAMUSCULAR; INTRAVENOUS at 21:55

## 2020-02-08 RX ADMIN — DEXTRAN 70 AND HYPROMELLOSE 2910 1 DROP: 1; 3 SOLUTION/ DROPS OPHTHALMIC at 15:55

## 2020-02-08 RX ADMIN — IPRATROPIUM BROMIDE 0.5 MG: 0.5 SOLUTION RESPIRATORY (INHALATION) at 13:24

## 2020-02-08 NOTE — PROGRESS NOTES
Progress Note - Acute Pain Service    Meet Douglas 82 y o  female MRN: [id number]  Unit/Bed#: [facility] 802-01 Encounter: [id number]      Assessment:   POD # 4 Ex lap, antrectomy with B-II gastrojejunostomy, thoracic epidural placed 2/7  Currently NPO with NGT    Pt states that her pain is much better controlled with the epidural   Endorses 7-8/10 pain however no objective signs of pain on exam with deep palpation of abdomen or with movement for epidural check  Pt states that when she uses the PCEA, it does help with her pain  Pt did have a one time fever to 100 6 overnight  WBC trending down  Will continue to monitor  Plan:   - Continue thoracic epidural with ropiv 0 1% + fentanyl 2mcg/ml at current settings 10/5/10/3  - Cannot restart eliquis until epidural removed       APS will continue to follow; please contact APS ( btwn [phone]) with any further questions    Pain History  24 hour history: improved pain control with epidural placement  24 hour opoid requirement: none    Meds/Allergies   current meds:   Current Facility-Administered Medications   Medication Dose Route Frequency    acetaminophen (TYLENOL) oral suspension 650 mg  650 mg Oral Q4H PRN    Adult 3-in-1 TPN (custom base / custom electrolytes)   Intravenous Continuous    Adult 3-in-1 TPN (custom base / custom electrolytes)   Intravenous Continuous    albuterol (PROVENTIL HFA,VENTOLIN HFA) inhaler 2 puff  2 puff Inhalation Q4H PRN    bisacodyl (DULCOLAX) rectal suppository 10 mg  10 mg Rectal Daily    ceFAZolin (ANCEF) IVPB (premix) 1,000 mg  1,000 mg Intravenous Q12H    dextran 70-hypromellose (GENTEAL TEARS) 0 1-0 3 % ophthalmic solution 1 drop  1 drop Both Eyes TID    diazepam (VALIUM) injection 2 5 mg  2 5 mg Intravenous Q8H PRN    heparin (porcine) subcutaneous injection 5,000 Units  5,000 Units Subcutaneous Q8H [facility]    HYDROmorphone (DILAUDID) injection 0 3 mg  0 3 mg Intravenous Q2H PRN    ipratropium (ATROVENT) 0 02 % inhalation solution 0 5 mg  0 5 mg Nebulization TID    lactated ringers infusion  50 mL/hr Intravenous Continuous    levalbuterol (XOPENEX) inhalation solution 1 25 mg  1 25 mg Nebulization TID    metoclopramide (REGLAN) injection 5 mg  5 mg Intravenous Q6H [facility]    metoprolol (LOPRESSOR) injection 5 mg  5 mg Intravenous Q6H PRN    metoprolol (LOPRESSOR) injection 5 mg  5 mg Intravenous Q6H    neomycin-bacitracin-polymyxin b (NEOSPORIN) ointment 1 small application  1 small application Topical Daily    ondansetron (ZOFRAN) injection 4 mg  4 mg Intravenous Q4H PRN    pantoprazole (PROTONIX) injection 40 mg  40 mg Intravenous Q12H [facility]    phenol (CHLORASEPTIC) 1 4 % mucosal liquid 1 spray  1 spray Mouth/Throat Q2H PRN    ropivacaine 0 1% and fentaNYL 2 mcg/mL PCEA   Epidural Continuous    saliva substitute (MOUTH KOTE) mucosal solution 5 spray  5 spray Mouth/Throat 4x Daily PRN    simethicone (MYLICON) chewable tablet 80 mg  80 mg Oral Q6H PRN    sodium chloride (OCEAN) 0 65 % nasal spray 1 spray  1 spray Each Nare Q1H PRN       Allergies   Allergen Reactions    Fluticasone        Objective     Temp:  [98 °F (36 7 °C)-100 6 °F (38 1 °C)] 98 °F (36 7 °C)  HR:  [] 88  Resp:  [16-22] 20  BP: ()/(54-78) 114/78    Physical Exam   Constitutional: She is oriented to person, place, and time  She appears well-developed and well-nourished  HENT:   Head: Normocephalic and atraumatic  Cardiovascular: Normal rate  Pulmonary/Chest: Effort normal    3L NC   Abdominal: Soft  She exhibits no distension  There is no tenderness  There is no rebound and no guarding  Musculoskeletal: Normal range of motion  Neurological: She is alert and oriented to person, place, and time  Skin: Skin is warm and dry  Vitals reviewed  Lab Results: CBC: No results found for: WBC, HGB, HCT, MCV, PLT, ADJUSTEDWBC, MCH, MCHC, RDW, MPV, NRBC  Imaging Studies: I have personally reviewed pertinent reports      EKG, Pathology, and Other Studies: I have personally reviewed pertinent reports        Levels of Care: Level 1 = 15 minutes

## 2020-02-08 NOTE — NURSING NOTE
, Temp 100 1, BP 98/60 manually  Pt is sleeping  Notified See w Surgery about vital signs  Will hold dose of 5mg IV lopressor at this time per surgery  Will continue to monitor

## 2020-02-08 NOTE — PLAN OF CARE
Problem: Prexisting or High Potential for Compromised Skin Integrity  Goal: Skin integrity is maintained or improved  Description  INTERVENTIONS:  - Identify patients at risk for skin breakdown  - Assess and monitor skin integrity  - Assess and monitor nutrition and hydration status  - Monitor labs   - Assess for incontinence   - Turn and reposition patient  - Assist with mobility/ambulation  - Relieve pressure over bony prominences  - Avoid friction and shearing  - Provide appropriate hygiene as needed including keeping skin clean and dry  - Evaluate need for skin moisturizer/barrier cream  - Collaborate with interdisciplinary team   - Patient/family teaching  - Consider wound care consult   Outcome: Progressing     Problem: Potential for Falls  Goal: Patient will remain free of falls  Description  INTERVENTIONS:  - Assess patient frequently for physical needs  -  Identify cognitive and physical deficits and behaviors that affect risk of falls    -  [place] fall precautions as indicated by assessment   - Educate patient/family on patient safety including physical limitations  - Instruct patient to call for assistance with activity based on assessment  - Modify environment to reduce risk of injury  - Consider OT/PT consult to assist with strengthening/mobility  Outcome: Progressing     Problem: PAIN - ADULT  Goal: Verbalizes/displays adequate comfort level or baseline comfort level  Description  Interventions:  - Encourage patient to monitor pain and request assistance  - Assess pain using appropriate pain scale  - Administer analgesics based on type and severity of pain and evaluate response  - Implement non-pharmacological measures as appropriate and evaluate response  - Consider cultural and social influences on pain and pain management  - Notify physician/advanced practitioner if interventions unsuccessful or patient reports new pain  Outcome: Progressing     Problem: INFECTION - ADULT  Goal: Absence or prevention of progression during hospitalization  Description  INTERVENTIONS:  - Assess and monitor for signs and symptoms of infection  - Monitor lab/diagnostic results  - Monitor all insertion sites, i e  indwelling lines, tubes, and drains  - Monitor endotracheal if appropriate and nasal secretions for changes in amount and color  - [place] appropriate cooling/warming therapies per order  - Administer medications as ordered  - Instruct and encourage patient and family to use good hand hygiene technique  - Identify and instruct in appropriate isolation precautions for identified infection/condition  Outcome: Progressing  Goal: Absence of fever/infection during neutropenic period  Description  INTERVENTIONS:  - Monitor WBC    Outcome: Progressing     Problem: SAFETY ADULT  Goal: Patient will remain free of falls  Description  INTERVENTIONS:  - Assess patient frequently for physical needs  -  Identify cognitive and physical deficits and behaviors that affect risk of falls    -  [place] fall precautions as indicated by assessment   - Educate patient/family on patient safety including physical limitations  - Instruct patient to call for assistance with activity based on assessment  - Modify environment to reduce risk of injury  - Consider OT/PT consult to assist with strengthening/mobility  Outcome: Progressing  Goal: Maintain or return to baseline ADL function  Description  INTERVENTIONS:  -  Assess patient's ability to carry out ADLs; assess patient's baseline for ADL function and identify physical deficits which impact ability to perform ADLs (bathing, care of mouth/teeth, toileting, grooming, dressing, etc )  - Assess/evaluate cause of self-care deficits   - Assess range of motion  - Assess patient's mobility; develop plan if impaired  - Assess patient's need for assistive devices and provide as appropriate  - Encourage maximum independence but intervene and supervise when necessary  - Involve family in performance of ADLs  - Assess for home care needs following discharge   - Consider OT consult to assist with ADL evaluation and planning for discharge  - Provide patient education as appropriate  Outcome: Progressing  Goal: Maintain or return mobility status to optimal level  Description  INTERVENTIONS:  - Assess patient's baseline mobility status (ambulation, transfers, stairs, etc )    - Identify cognitive and physical deficits and behaviors that affect mobility  - Identify mobility aids required to assist with transfers and/or ambulation (gait belt, sit-to-stand, lift, walker, cane, etc )  - [place] fall precautions as indicated by assessment  - Record patient progress and toleration of activity level on Mobility SBAR; progress patient to next Phase/Stage  - Instruct patient to call for assistance with activity based on assessment  - Consider rehabilitation consult to assist with strengthening/weightbearing, etc   Outcome: Progressing     Problem: DISCHARGE PLANNING  Goal: Discharge to home or other facility with appropriate resources  Description  INTERVENTIONS:  - Identify barriers to discharge w/patient and caregiver  - Arrange for needed discharge resources and transportation as appropriate  - Identify discharge learning needs (meds, wound care, etc )  - Arrange for interpretive services to assist at discharge as needed  - Refer to Case Management Department for coordinating discharge planning if the patient needs post-hospital services based on physician/advanced practitioner order or complex needs related to functional status, cognitive ability, or social support system  Outcome: Progressing     Problem: Knowledge Deficit  Goal: Patient/family/caregiver demonstrates understanding of disease process, treatment plan, medications, and discharge instructions  Description  Complete learning assessment and assess knowledge base    Interventions:  - Provide teaching at level of understanding  - Provide teaching via preferred learning methods  Outcome: Progressing     Problem: RESPIRATORY - ADULT  Goal: Achieves optimal ventilation and oxygenation  Description  INTERVENTIONS:  - Assess for changes in respiratory status  - Assess for changes in mentation and behavior  - Position to facilitate oxygenation and minimize respiratory effort  - Oxygen administered by appropriate delivery if ordered  - Initiate smoking cessation education as indicated  - Encourage broncho-pulmonary hygiene including cough, deep breathe, Incentive Spirometry  - Assess the need for suctioning and aspirate as needed  - Assess and instruct to report SOB or any respiratory difficulty  - Respiratory Therapy support as indicated  Outcome: Progressing     Problem: GASTROINTESTINAL - ADULT  Goal: Minimal or absence of nausea and/or vomiting  Description  INTERVENTIONS:  - Administer IV fluids if ordered to ensure adequate hydration  - Maintain NPO status until nausea and vomiting are resolved  - Nasogastric tube if ordered  - Administer ordered antiemetic medications as needed  - Provide nonpharmacologic comfort measures as appropriate  - Advance diet as tolerated, if ordered  - Consider nutrition services referral to assist patient with adequate nutrition and appropriate food choices  Outcome: Progressing  Goal: Maintains or returns to baseline bowel function  Description  INTERVENTIONS:  - Assess bowel function  - Encourage oral fluids to ensure adequate hydration  - Administer IV fluids if ordered to ensure adequate hydration  - Administer ordered medications as needed  - Encourage mobilization and activity  - Consider nutritional services referral to assist patient with adequate nutrition and appropriate food choices  Outcome: Progressing  Goal: Maintains adequate nutritional intake  Description  INTERVENTIONS:  - Monitor percentage of each meal consumed  - Identify factors contributing to decreased intake, treat as appropriate  - Assist with meals as needed  - Monitor I&O, weight, and lab values if indicated  - Obtain nutrition services referral as needed  Outcome: Progressing     Problem: METABOLIC, FLUID AND ELECTROLYTES - ADULT  Goal: Electrolytes maintained within normal limits  Description  INTERVENTIONS:  - Monitor labs and assess patient for signs and symptoms of electrolyte imbalances  - Administer electrolyte replacement as ordered  - Monitor response to electrolyte replacements, including repeat lab results as appropriate  - Instruct patient on fluid and nutrition as appropriate  Outcome: Progressing  Goal: Fluid balance maintained  Description  INTERVENTIONS:  - Monitor labs   - Monitor I/O and WT  - Instruct patient on fluid and nutrition as appropriate  - Assess for signs & symptoms of volume excess or deficit  Outcome: Progressing     Problem: MUSCULOSKELETAL - ADULT  Goal: Maintain or return mobility to safest level of function  Description  INTERVENTIONS:  - Assess patient's ability to carry out ADLs; assess patient's baseline for ADL function and identify physical deficits which impact ability to perform ADLs (bathing, care of mouth/teeth, toileting, grooming, dressing, etc )  - Assess/evaluate cause of self-care deficits   - Assess range of motion  - Assess patient's mobility  - Assess patient's need for assistive devices and provide as appropriate  - Encourage maximum independence but intervene and supervise when necessary  - Involve family in performance of ADLs  - Assess for home care needs following discharge   - Consider OT consult to assist with ADL evaluation and planning for discharge  - Provide patient education as appropriate  Outcome: Progressing  Goal: Maintain proper alignment of affected body part  Description  INTERVENTIONS:  - Support, maintain and protect limb and body alignment  - Provide patient/ family with appropriate education  Outcome: Progressing     Problem: Nutrition/Hydration-ADULT  Goal: Nutrient/Hydration intake appropriate for improving, restoring or maintaining nutritional needs  Description  Monitor and assess patient's nutrition/hydration status for malnutrition  Collaborate with interdisciplinary team and initiate plan and interventions as ordered  Monitor patient's weight and dietary intake as ordered or per policy  Utilize nutrition screening tool and intervene as necessary  Determine patient's food preferences and provide high-protein, high-caloric foods as appropriate       INTERVENTIONS:  - Monitor oral intake, urinary output, labs, and treatment plans  - Assess nutrition and hydration status and recommend course of action  - Evaluate amount of meals eaten  - Assist patient with eating if necessary   - Allow adequate time for meals  - Recommend/ encourage appropriate diets, oral nutritional supplements, and vitamin/mineral supplements  - Order, calculate, and assess calorie counts as needed  - Recommend, monitor, and adjust tube feedings and TPN/PPN based on assessed needs  - Assess need for intravenous fluids  - Provide specific nutrition/hydration education as appropriate  - Include patient/family/caregiver in decisions related to nutrition  Outcome: Progressing     Problem: COPING  Goal: Pt/Family able to verbalize concerns and demonstrate effective coping strategies  Description  INTERVENTIONS:  - Assist patient/family to identify coping skills, available support systems and cultural and spiritual values  - Provide emotional support, including active listening and acknowledgement of concerns of patient and caregivers  - Reduce environmental stimuli, as able  - Provide patient education  - Assess for spiritual pain/suffering and initiate spiritual care, including notification of Pastoral Care or [place] based community as needed  - Assess effectiveness of coping strategies  Outcome: Progressing  Goal: Will report anxiety at manageable levels  Description  INTERVENTIONS:  - Administer medication as ordered  - Teach and encourage coping skills  - Provide emotional support  - Assess patient/family for anxiety and ability to cope  Outcome: Progressing     Problem: CARDIOVASCULAR - ADULT  Goal: Maintains optimal cardiac output and hemodynamic stability  Description  INTERVENTIONS:  - Monitor I/O, vital signs and rhythm  - Monitor for S/S and trends of decreased cardiac output  - Administer and titrate ordered vasoactive medications to optimize hemodynamic stability  - Assess quality of pulses, skin color and temperature  - Assess for signs of decreased coronary artery perfusion  - Instruct patient to report change in severity of symptoms  Outcome: Progressing  Goal: Absence of cardiac dysrhythmias or at baseline rhythm  Description  INTERVENTIONS:  - Continuous cardiac monitoring, vital signs, obtain 12 lead EKG if ordered  - Administer antiarrhythmic and heart rate control medications as ordered  - Monitor electrolytes and administer replacement therapy as ordered  Outcome: Progressing

## 2020-02-08 NOTE — NURSING NOTE
Pt demanding to have a piece of hard candy  Pt states that "a doctor said it was OK for her to have them and gave them to her"  Spoke [facility]  OK for pt to have a piece of hard candy if pt is sitting straight up in bed

## 2020-02-08 NOTE — PROGRESS NOTES
Progress Note - General Surgery   Amina Agarwal 82 y o  female MRN: [id number]  Unit/Bed#: [facility] 802-01 Encounter: [id number]    Assessment:  81yo F with gastric outlet obstruction s/p multiple EGDs, now POD#3 s/p ex lap, antrectomy with B-II gastrojejunostomy     NGT: 1360 (1100)  CATHERINE: 60 (54) serosang    Plan:  · NPO/NGT  · IVF while NPO  · Await return of bowel function  · Will get swallow study today  · Will plan to restart eliquis once bowel function returned and NGT removed  · Pain control: epidural per APS  · PICC/TPN  · Maintain CATHERINE, monitor output  · F/u pathology  · DVT PPx: SQH, SCDs    Subjective/Objective     Subjective:   No acute events overnight  Remains without bowel function  Epidural started by APS yesterday; BP ~100s/~60s overnight  States her pain is greatly improved since epidural was placed, but that she is still having significant abdominal pain  Also c/o xerostomia--oral swabs and mouth-kote noted to be at bedside; per RN, patient has not been using them very often  Ambulating to chair  Objective:    Blood pressure 114/78, pulse 88, temperature 98 °F (36 7 °C), resp  rate 20, height 5' (1 524 m), weight 64 7 kg (142 lb 11 2 oz), SpO2 92 %, not currently breastfeeding  ,Body mass index is 27 87 kg/m²        Intake/Output Summary (Last 24 hours) at 2/8/2020 0715  Last data filed at 2/8/2020 0630  Gross per 24 hour   Intake 1601 38 ml   Output 2515 ml   Net -913 62 ml       Invasive Devices     Peripherally Inserted Central Catheter Line            PICC Line 02/01/20 Right Brachial 6 days          Epidural Line            Epidural Catheter 02/07/20 less than 1 day          Drain            External Urinary Catheter 5 days    Closed/Suction Drain Right RUQ Bulb 10 Fr  4 days    NG/OG/Enteral Tube Nasogastric Right nares 2 days                Physical Exam:   Gen:  NAD  ENT:  NGT in place  CV:  RRR  Lungs: nl effort on NC  Abd:  soft, distended, generalized tenderness   CATHERINE in place to bulb suction with serosang output   incision C/D/I with dressing in place  Ext:  no CCE  Neuro: A&Ox3     Results from last 7 days   Lab Units 02/06/20  0555 02/05/20  0711 02/03/20  1313   WBC Thousand/uL 10 56* 13 09* 7 33   HEMOGLOBIN g/dL 10 1* 10 6* 10 5*   HEMATOCRIT % 33 5* 34 8 34 7*   PLATELETS Thousands/uL 279 254 298     Results from last 7 days   Lab Units 02/08/20  0533 02/05/20  0711 02/04/20  0524   POTASSIUM mmol/L 4 1 4 0 3 2*   CHLORIDE mmol/L 105 104 109*   CO2 mmol/L 26 29 23   BUN mg/dL 11 13 8   CREATININE mg/dL 0 39* 0 38* 0 43*   CALCIUM mg/dL 8 7 8 3 7 3*

## 2020-02-09 LAB
ANION GAP SERPL CALCULATED.3IONS-SCNC: 5 MMOL/L (ref 4–13)
BUN SERPL-MCNC: 12 MG/DL (ref 5–25)
CALCIUM SERPL-MCNC: 8.7 MG/DL (ref 8.3–10.1)
CHLORIDE SERPL-SCNC: 102 MMOL/L (ref 100–108)
CO2 SERPL-SCNC: 29 MMOL/L (ref 21–32)
CREAT SERPL-MCNC: 0.42 MG/DL (ref 0.6–1.3)
ERYTHROCYTE [DISTWIDTH] IN BLOOD BY AUTOMATED COUNT: 15.8 % (ref 11.6–15.1)
GFR SERPL CREATININE-BSD FRML MDRD: 96 ML/MIN/1.73SQ M
GLUCOSE SERPL-MCNC: 118 MG/DL (ref 65–140)
HCT VFR BLD AUTO: 31 % (ref 34.8–46.1)
HGB BLD-MCNC: 9.4 G/DL (ref 11.5–15.4)
MAGNESIUM SERPL-MCNC: 2.2 MG/DL (ref 1.6–2.6)
MCH RBC QN AUTO: 25.5 PG (ref 26.8–34.3)
MCHC RBC AUTO-ENTMCNC: 30.3 G/DL (ref 31.4–37.4)
MCV RBC AUTO: 84 FL (ref 82–98)
PHOSPHATE SERPL-MCNC: 3.9 MG/DL (ref 2.3–4.1)
PLATELET # BLD AUTO: 260 THOUSANDS/UL (ref 149–390)
PMV BLD AUTO: 10.4 FL (ref 8.9–12.7)
POTASSIUM SERPL-SCNC: 4.1 MMOL/L (ref 3.5–5.3)
RBC # BLD AUTO: 3.68 MILLION/UL (ref 3.81–5.12)
SODIUM SERPL-SCNC: 136 MMOL/L (ref 136–145)
WBC # BLD AUTO: 11.84 THOUSAND/UL (ref 4.31–10.16)

## 2020-02-09 PROCEDURE — 84100 ASSAY OF PHOSPHORUS: CPT | Performed by: SURGERY

## 2020-02-09 PROCEDURE — 99024 POSTOP FOLLOW-UP VISIT: CPT | Performed by: SURGERY

## 2020-02-09 PROCEDURE — 85027 COMPLETE CBC AUTOMATED: CPT | Performed by: SURGERY

## 2020-02-09 PROCEDURE — 83735 ASSAY OF MAGNESIUM: CPT | Performed by: SURGERY

## 2020-02-09 PROCEDURE — 80048 BASIC METABOLIC PNL TOTAL CA: CPT | Performed by: SURGERY

## 2020-02-09 PROCEDURE — 94760 N-INVAS EAR/PLS OXIMETRY 1: CPT

## 2020-02-09 PROCEDURE — 94640 AIRWAY INHALATION TREATMENT: CPT

## 2020-02-09 PROCEDURE — C9113 INJ PANTOPRAZOLE SODIUM, VIA: HCPCS | Performed by: SURGERY

## 2020-02-09 PROCEDURE — 99232 SBSQ HOSP IP/OBS MODERATE 35: CPT | Performed by: ANESTHESIOLOGY

## 2020-02-09 RX ADMIN — PANTOPRAZOLE SODIUM 40 MG: 40 INJECTION, POWDER, FOR SOLUTION INTRAVENOUS at 21:59

## 2020-02-09 RX ADMIN — DEXTRAN 70 AND HYPROMELLOSE 2910 1 DROP: 1; 3 SOLUTION/ DROPS OPHTHALMIC at 16:16

## 2020-02-09 RX ADMIN — METOCLOPRAMIDE 5 MG: 5 INJECTION, SOLUTION INTRAMUSCULAR; INTRAVENOUS at 04:35

## 2020-02-09 RX ADMIN — METOPROLOL TARTRATE 5 MG: 5 INJECTION, SOLUTION INTRAVENOUS at 04:35

## 2020-02-09 RX ADMIN — Medication: at 19:28

## 2020-02-09 RX ADMIN — BISACODYL 10 MG: 10 SUPPOSITORY RECTAL at 10:16

## 2020-02-09 RX ADMIN — DEXTRAN 70 AND HYPROMELLOSE 2910 1 DROP: 1; 3 SOLUTION/ DROPS OPHTHALMIC at 10:17

## 2020-02-09 RX ADMIN — IPRATROPIUM BROMIDE 0.5 MG: 0.5 SOLUTION RESPIRATORY (INHALATION) at 07:02

## 2020-02-09 RX ADMIN — LEVALBUTEROL HYDROCHLORIDE 1.25 MG: 1.25 SOLUTION, CONCENTRATE RESPIRATORY (INHALATION) at 19:34

## 2020-02-09 RX ADMIN — HEPARIN SODIUM 5000 UNITS: 5000 INJECTION INTRAVENOUS; SUBCUTANEOUS at 21:59

## 2020-02-09 RX ADMIN — THIAMINE HYDROCHLORIDE: 100 INJECTION, SOLUTION INTRAMUSCULAR; INTRAVENOUS at 21:59

## 2020-02-09 RX ADMIN — Medication: at 07:06

## 2020-02-09 RX ADMIN — METOCLOPRAMIDE 5 MG: 5 INJECTION, SOLUTION INTRAMUSCULAR; INTRAVENOUS at 21:59

## 2020-02-09 RX ADMIN — CEFAZOLIN SODIUM 1000 MG: 1 SOLUTION INTRAVENOUS at 10:12

## 2020-02-09 RX ADMIN — METOPROLOL TARTRATE 5 MG: 5 INJECTION, SOLUTION INTRAVENOUS at 22:00

## 2020-02-09 RX ADMIN — HEPARIN SODIUM 5000 UNITS: 5000 INJECTION INTRAVENOUS; SUBCUTANEOUS at 05:35

## 2020-02-09 RX ADMIN — METOCLOPRAMIDE 5 MG: 5 INJECTION, SOLUTION INTRAMUSCULAR; INTRAVENOUS at 10:16

## 2020-02-09 RX ADMIN — BACITRACIN ZINC, NEOMYCIN SULFATE, AND POLYMYXIN B SULFATE 1 SMALL APPLICATION: 400; 3.5; 5 OINTMENT TOPICAL at 10:17

## 2020-02-09 RX ADMIN — LEVALBUTEROL HYDROCHLORIDE 1.25 MG: 1.25 SOLUTION, CONCENTRATE RESPIRATORY (INHALATION) at 07:02

## 2020-02-09 RX ADMIN — METOPROLOL TARTRATE 5 MG: 5 INJECTION, SOLUTION INTRAVENOUS at 16:21

## 2020-02-09 RX ADMIN — HEPARIN SODIUM 5000 UNITS: 5000 INJECTION INTRAVENOUS; SUBCUTANEOUS at 13:29

## 2020-02-09 RX ADMIN — DEXTRAN 70 AND HYPROMELLOSE 2910 1 DROP: 1; 3 SOLUTION/ DROPS OPHTHALMIC at 21:59

## 2020-02-09 RX ADMIN — IPRATROPIUM BROMIDE 0.5 MG: 0.5 SOLUTION RESPIRATORY (INHALATION) at 19:34

## 2020-02-09 RX ADMIN — METOCLOPRAMIDE 5 MG: 5 INJECTION, SOLUTION INTRAMUSCULAR; INTRAVENOUS at 16:16

## 2020-02-09 RX ADMIN — PANTOPRAZOLE SODIUM 40 MG: 40 INJECTION, POWDER, FOR SOLUTION INTRAVENOUS at 10:16

## 2020-02-09 RX ADMIN — METOPROLOL TARTRATE 5 MG: 5 INJECTION, SOLUTION INTRAVENOUS at 10:16

## 2020-02-09 RX ADMIN — IPRATROPIUM BROMIDE 0.5 MG: 0.5 SOLUTION RESPIRATORY (INHALATION) at 13:05

## 2020-02-09 RX ADMIN — LEVALBUTEROL HYDROCHLORIDE 1.25 MG: 1.25 SOLUTION, CONCENTRATE RESPIRATORY (INHALATION) at 13:05

## 2020-02-09 RX ADMIN — SODIUM CHLORIDE, SODIUM LACTATE, POTASSIUM CHLORIDE, AND CALCIUM CHLORIDE 50 ML/HR: .6; .31; .03; .02 INJECTION, SOLUTION INTRAVENOUS at 09:14

## 2020-02-09 NOTE — PLAN OF CARE
Problem: Prexisting or High Potential for Compromised Skin Integrity  Goal: Skin integrity is maintained or improved  Description  INTERVENTIONS:  - Identify patients at risk for skin breakdown  - Assess and monitor skin integrity  - Assess and monitor nutrition and hydration status  - Monitor labs   - Assess for incontinence   - Turn and reposition patient  - Assist with mobility/ambulation  - Relieve pressure over bony prominences  - Avoid friction and shearing  - Provide appropriate hygiene as needed including keeping skin clean and dry  - Evaluate need for skin moisturizer/barrier cream  - Collaborate with interdisciplinary team   - Patient/family teaching  - Consider wound care consult   Outcome: Progressing     Problem: Potential for Falls  Goal: Patient will remain free of falls  Description  INTERVENTIONS:  - Assess patient frequently for physical needs  -  Identify cognitive and physical deficits and behaviors that affect risk of falls    -  [place] fall precautions as indicated by assessment   - Educate patient/family on patient safety including physical limitations  - Instruct patient to call for assistance with activity based on assessment  - Modify environment to reduce risk of injury  - Consider OT/PT consult to assist with strengthening/mobility  Outcome: Progressing     Problem: PAIN - ADULT  Goal: Verbalizes/displays adequate comfort level or baseline comfort level  Description  Interventions:  - Encourage patient to monitor pain and request assistance  - Assess pain using appropriate pain scale  - Administer analgesics based on type and severity of pain and evaluate response  - Implement non-pharmacological measures as appropriate and evaluate response  - Consider cultural and social influences on pain and pain management  - Notify physician/advanced practitioner if interventions unsuccessful or patient reports new pain  Outcome: Progressing     Problem: INFECTION - ADULT  Goal: Absence or prevention of progression during hospitalization  Description  INTERVENTIONS:  - Assess and monitor for signs and symptoms of infection  - Monitor lab/diagnostic results  - Monitor all insertion sites, i e  indwelling lines, tubes, and drains  - Monitor endotracheal if appropriate and nasal secretions for changes in amount and color  - [place] appropriate cooling/warming therapies per order  - Administer medications as ordered  - Instruct and encourage patient and family to use good hand hygiene technique  - Identify and instruct in appropriate isolation precautions for identified infection/condition  Outcome: Progressing  Goal: Absence of fever/infection during neutropenic period  Description  INTERVENTIONS:  - Monitor WBC    Outcome: Progressing     Problem: SAFETY ADULT  Goal: Patient will remain free of falls  Description  INTERVENTIONS:  - Assess patient frequently for physical needs  -  Identify cognitive and physical deficits and behaviors that affect risk of falls    -  [place] fall precautions as indicated by assessment   - Educate patient/family on patient safety including physical limitations  - Instruct patient to call for assistance with activity based on assessment  - Modify environment to reduce risk of injury  - Consider OT/PT consult to assist with strengthening/mobility  Outcome: Progressing  Goal: Maintain or return to baseline ADL function  Description  INTERVENTIONS:  -  Assess patient's ability to carry out ADLs; assess patient's baseline for ADL function and identify physical deficits which impact ability to perform ADLs (bathing, care of mouth/teeth, toileting, grooming, dressing, etc )  - Assess/evaluate cause of self-care deficits   - Assess range of motion  - Assess patient's mobility; develop plan if impaired  - Assess patient's need for assistive devices and provide as appropriate  - Encourage maximum independence but intervene and supervise when necessary  - Involve family in performance of ADLs  - Assess for home care needs following discharge   - Consider OT consult to assist with ADL evaluation and planning for discharge  - Provide patient education as appropriate  Outcome: Progressing  Goal: Maintain or return mobility status to optimal level  Description  INTERVENTIONS:  - Assess patient's baseline mobility status (ambulation, transfers, stairs, etc )    - Identify cognitive and physical deficits and behaviors that affect mobility  - Identify mobility aids required to assist with transfers and/or ambulation (gait belt, sit-to-stand, lift, walker, cane, etc )  - [place] fall precautions as indicated by assessment  - Record patient progress and toleration of activity level on Mobility SBAR; progress patient to next Phase/Stage  - Instruct patient to call for assistance with activity based on assessment  - Consider rehabilitation consult to assist with strengthening/weightbearing, etc   Outcome: Progressing     Problem: DISCHARGE PLANNING  Goal: Discharge to home or other facility with appropriate resources  Description  INTERVENTIONS:  - Identify barriers to discharge w/patient and caregiver  - Arrange for needed discharge resources and transportation as appropriate  - Identify discharge learning needs (meds, wound care, etc )  - Arrange for interpretive services to assist at discharge as needed  - Refer to Case Management Department for coordinating discharge planning if the patient needs post-hospital services based on physician/advanced practitioner order or complex needs related to functional status, cognitive ability, or social support system  Outcome: Progressing     Problem: Knowledge Deficit  Goal: Patient/family/caregiver demonstrates understanding of disease process, treatment plan, medications, and discharge instructions  Description  Complete learning assessment and assess knowledge base    Interventions:  - Provide teaching at level of understanding  - Provide teaching via preferred learning methods  Outcome: Progressing     Problem: RESPIRATORY - ADULT  Goal: Achieves optimal ventilation and oxygenation  Description  INTERVENTIONS:  - Assess for changes in respiratory status  - Assess for changes in mentation and behavior  - Position to facilitate oxygenation and minimize respiratory effort  - Oxygen administered by appropriate delivery if ordered  - Initiate smoking cessation education as indicated  - Encourage broncho-pulmonary hygiene including cough, deep breathe, Incentive Spirometry  - Assess the need for suctioning and aspirate as needed  - Assess and instruct to report SOB or any respiratory difficulty  - Respiratory Therapy support as indicated  Outcome: Progressing     Problem: GASTROINTESTINAL - ADULT  Goal: Minimal or absence of nausea and/or vomiting  Description  INTERVENTIONS:  - Administer IV fluids if ordered to ensure adequate hydration  - Maintain NPO status until nausea and vomiting are resolved  - Nasogastric tube if ordered  - Administer ordered antiemetic medications as needed  - Provide nonpharmacologic comfort measures as appropriate  - Advance diet as tolerated, if ordered  - Consider nutrition services referral to assist patient with adequate nutrition and appropriate food choices  Outcome: Progressing  Goal: Maintains or returns to baseline bowel function  Description  INTERVENTIONS:  - Assess bowel function  - Encourage oral fluids to ensure adequate hydration  - Administer IV fluids if ordered to ensure adequate hydration  - Administer ordered medications as needed  - Encourage mobilization and activity  - Consider nutritional services referral to assist patient with adequate nutrition and appropriate food choices  Outcome: Progressing  Goal: Maintains adequate nutritional intake  Description  INTERVENTIONS:  - Monitor percentage of each meal consumed  - Identify factors contributing to decreased intake, treat as appropriate  - Assist with meals as needed  - Monitor I&O, weight, and lab values if indicated  - Obtain nutrition services referral as needed  Outcome: Progressing     Problem: METABOLIC, FLUID AND ELECTROLYTES - ADULT  Goal: Electrolytes maintained within normal limits  Description  INTERVENTIONS:  - Monitor labs and assess patient for signs and symptoms of electrolyte imbalances  - Administer electrolyte replacement as ordered  - Monitor response to electrolyte replacements, including repeat lab results as appropriate  - Instruct patient on fluid and nutrition as appropriate  Outcome: Progressing  Goal: Fluid balance maintained  Description  INTERVENTIONS:  - Monitor labs   - Monitor I/O and WT  - Instruct patient on fluid and nutrition as appropriate  - Assess for signs & symptoms of volume excess or deficit  Outcome: Progressing     Problem: MUSCULOSKELETAL - ADULT  Goal: Maintain or return mobility to safest level of function  Description  INTERVENTIONS:  - Assess patient's ability to carry out ADLs; assess patient's baseline for ADL function and identify physical deficits which impact ability to perform ADLs (bathing, care of mouth/teeth, toileting, grooming, dressing, etc )  - Assess/evaluate cause of self-care deficits   - Assess range of motion  - Assess patient's mobility  - Assess patient's need for assistive devices and provide as appropriate  - Encourage maximum independence but intervene and supervise when necessary  - Involve family in performance of ADLs  - Assess for home care needs following discharge   - Consider OT consult to assist with ADL evaluation and planning for discharge  - Provide patient education as appropriate  Outcome: Progressing  Goal: Maintain proper alignment of affected body part  Description  INTERVENTIONS:  - Support, maintain and protect limb and body alignment  - Provide patient/ family with appropriate education  Outcome: Progressing     Problem: CARDIOVASCULAR - ADULT  Goal: Maintains optimal cardiac output and hemodynamic stability  Description  INTERVENTIONS:  - Monitor I/O, vital signs and rhythm  - Monitor for S/S and trends of decreased cardiac output  - Administer and titrate ordered vasoactive medications to optimize hemodynamic stability  - Assess quality of pulses, skin color and temperature  - Assess for signs of decreased coronary artery perfusion  - Instruct patient to report change in severity of symptoms  Outcome: Progressing  Goal: Absence of cardiac dysrhythmias or at baseline rhythm  Description  INTERVENTIONS:  - Continuous cardiac monitoring, vital signs, obtain 12 lead EKG if ordered  - Administer antiarrhythmic and heart rate control medications as ordered  - Monitor electrolytes and administer replacement therapy as ordered  Outcome: Progressing     Problem: Nutrition/Hydration-ADULT  Goal: Nutrient/Hydration intake appropriate for improving, restoring or maintaining nutritional needs  Description  Monitor and assess patient's nutrition/hydration status for malnutrition  Collaborate with interdisciplinary team and initiate plan and interventions as ordered  Monitor patient's weight and dietary intake as ordered or per policy  Utilize nutrition screening tool and intervene as necessary  Determine patient's food preferences and provide high-protein, high-caloric foods as appropriate       INTERVENTIONS:  - Monitor oral intake, urinary output, labs, and treatment plans  - Assess nutrition and hydration status and recommend course of action  - Evaluate amount of meals eaten  - Assist patient with eating if necessary   - Allow adequate time for meals  - Recommend/ encourage appropriate diets, oral nutritional supplements, and vitamin/mineral supplements  - Order, calculate, and assess calorie counts as needed  - Recommend, monitor, and adjust tube feedings and TPN/PPN based on assessed needs  - Assess need for intravenous fluids  - Provide specific nutrition/hydration education as appropriate  - Include patient/family/caregiver in decisions related to nutrition  Outcome: Progressing     Problem: COPING  Goal: Pt/Family able to verbalize concerns and demonstrate effective coping strategies  Description  INTERVENTIONS:  - Assist patient/family to identify coping skills, available support systems and cultural and spiritual values  - Provide emotional support, including active listening and acknowledgement of concerns of patient and caregivers  - Reduce environmental stimuli, as able  - Provide patient education  - Assess for spiritual pain/suffering and initiate spiritual care, including notification of Pastoral Care or [place] based community as needed  - Assess effectiveness of coping strategies  Outcome: Progressing  Goal: Will report anxiety at manageable levels  Description  INTERVENTIONS:  - Administer medication as ordered  - Teach and encourage coping skills  - Provide emotional support  - Assess patient/family for anxiety and ability to cope  Outcome: Progressing

## 2020-02-09 NOTE — PROGRESS NOTES
Progress Note - General Surgery   Haleigh Rico 82 y o  female MRN: [id number]  Unit/Bed#: [facility] 802-01 Encounter: [id number]    Assessment:  81yo F with gastric outlet obstruction s/p multiple EGDs, now POD#6 s/p ex lap, antrectomy with B-II gastrojejunostomy     NGT: 350 (1450)  CATHERINE: 25 (65) serosang    Plan:  · NPO/NGT  ? Will consider clamp trial tomorrow if output remains low  ? Can have swabs of soup/broth while NPO  · MIVF while NPO  · Await return of bowel function  ? Will restart home eliquis once bowel function returned & NGT removed  · PICC/TPN  · Maintain CATHERINE, monitor output  · Pain control: epidural per APS  · F/u pathology  · F/u AM labs, replete electrolytes PRN  · DVT PPx: SQH, SCDs    Subjective/Objective     Subjective:   No acute events overnight  Reports she feels SOB this morning  Still c/o abdominal pain although she is nontender on exam  C/o xerostomia,     Objective:    Blood pressure 108/73, pulse 90, temperature 98 °F (36 7 °C), resp  rate 22, height 5' (1 524 m), weight 64 7 kg (142 lb 11 2 oz), SpO2 97 %, not currently breastfeeding  ,Body mass index is 27 87 kg/m²        Intake/Output Summary (Last 24 hours) at 2/9/2020 0516  Last data filed at 2/9/2020 0304  Gross per 24 hour   Intake 1197 08 ml   Output 1383 ml   Net -185 92 ml       Invasive Devices     Peripherally Inserted Central Catheter Line            PICC Line 02/01/20 Right Brachial 7 days          Epidural Line            Epidural Catheter 02/07/20 1 day          Drain            Closed/Suction Drain Right RUQ Bulb 10 Fr  5 days    External Urinary Catheter 5 days    NG/OG/Enteral Tube Nasogastric Right nares 3 days                Physical Exam:   Gen:    NAD  ENT:    NGT in place  CV:      RRR  Lungs: nl effort on NC  Abd:     soft, distended, generalized tenderness              CATHERINE in place to bulb suction with serosang output              incision C/D/I with dressing in place  Ext:      no CCE  Neuro: A&Ox3     Results from last 7 days   Lab Units 02/06/20  0555 02/05/20  0711 02/03/20  1313   WBC Thousand/uL 10 56* 13 09* 7 33   HEMOGLOBIN g/dL 10 1* 10 6* 10 5*   HEMATOCRIT % 33 5* 34 8 34 7*   PLATELETS Thousands/uL 279 254 298     Results from last 7 days   Lab Units 02/08/20  0533 02/05/20  0711 02/04/20  0524   POTASSIUM mmol/L 4 1 4 0 3 2*   CHLORIDE mmol/L 105 104 109*   CO2 mmol/L 26 29 23   BUN mg/dL 11 13 8   CREATININE mg/dL 0 39* 0 38* 0 43*   CALCIUM mg/dL 8 7 8 3 7 3*

## 2020-02-09 NOTE — PLAN OF CARE
Problem: Prexisting or High Potential for Compromised Skin Integrity  Goal: Skin integrity is maintained or improved  Description  INTERVENTIONS:  - Identify patients at risk for skin breakdown  - Assess and monitor skin integrity  - Assess and monitor nutrition and hydration status  - Monitor labs   - Assess for incontinence   - Turn and reposition patient  - Assist with mobility/ambulation  - Relieve pressure over bony prominences  - Avoid friction and shearing  - Provide appropriate hygiene as needed including keeping skin clean and dry  - Evaluate need for skin moisturizer/barrier cream  - Collaborate with interdisciplinary team   - Patient/family teaching  - Consider wound care consult   Outcome: Progressing     Problem: Potential for Falls  Goal: Patient will remain free of falls  Description  INTERVENTIONS:  - Assess patient frequently for physical needs  -  Identify cognitive and physical deficits and behaviors that affect risk of falls    -  [place] fall precautions as indicated by assessment   - Educate patient/family on patient safety including physical limitations  - Instruct patient to call for assistance with activity based on assessment  - Modify environment to reduce risk of injury  - Consider OT/PT consult to assist with strengthening/mobility  Outcome: Progressing     Problem: PAIN - ADULT  Goal: Verbalizes/displays adequate comfort level or baseline comfort level  Description  Interventions:  - Encourage patient to monitor pain and request assistance  - Assess pain using appropriate pain scale  - Administer analgesics based on type and severity of pain and evaluate response  - Implement non-pharmacological measures as appropriate and evaluate response  - Consider cultural and social influences on pain and pain management  - Notify physician/advanced practitioner if interventions unsuccessful or patient reports new pain  Outcome: Progressing     Problem: INFECTION - ADULT  Goal: Absence or prevention of progression during hospitalization  Description  INTERVENTIONS:  - Assess and monitor for signs and symptoms of infection  - Monitor lab/diagnostic results  - Monitor all insertion sites, i e  indwelling lines, tubes, and drains  - Monitor endotracheal if appropriate and nasal secretions for changes in amount and color  - [place] appropriate cooling/warming therapies per order  - Administer medications as ordered  - Instruct and encourage patient and family to use good hand hygiene technique  - Identify and instruct in appropriate isolation precautions for identified infection/condition  Outcome: Progressing  Goal: Absence of fever/infection during neutropenic period  Description  INTERVENTIONS:  - Monitor WBC    Outcome: Progressing     Problem: SAFETY ADULT  Goal: Patient will remain free of falls  Description  INTERVENTIONS:  - Assess patient frequently for physical needs  -  Identify cognitive and physical deficits and behaviors that affect risk of falls    -  [place] fall precautions as indicated by assessment   - Educate patient/family on patient safety including physical limitations  - Instruct patient to call for assistance with activity based on assessment  - Modify environment to reduce risk of injury  - Consider OT/PT consult to assist with strengthening/mobility  Outcome: Progressing  Goal: Maintain or return to baseline ADL function  Description  INTERVENTIONS:  -  Assess patient's ability to carry out ADLs; assess patient's baseline for ADL function and identify physical deficits which impact ability to perform ADLs (bathing, care of mouth/teeth, toileting, grooming, dressing, etc )  - Assess/evaluate cause of self-care deficits   - Assess range of motion  - Assess patient's mobility; develop plan if impaired  - Assess patient's need for assistive devices and provide as appropriate  - Encourage maximum independence but intervene and supervise when necessary  - Involve family in performance of ADLs  - Assess for home care needs following discharge   - Consider OT consult to assist with ADL evaluation and planning for discharge  - Provide patient education as appropriate  Outcome: Progressing  Goal: Maintain or return mobility status to optimal level  Description  INTERVENTIONS:  - Assess patient's baseline mobility status (ambulation, transfers, stairs, etc )    - Identify cognitive and physical deficits and behaviors that affect mobility  - Identify mobility aids required to assist with transfers and/or ambulation (gait belt, sit-to-stand, lift, walker, cane, etc )  - [place] fall precautions as indicated by assessment  - Record patient progress and toleration of activity level on Mobility SBAR; progress patient to next Phase/Stage  - Instruct patient to call for assistance with activity based on assessment  - Consider rehabilitation consult to assist with strengthening/weightbearing, etc   Outcome: Progressing     Problem: DISCHARGE PLANNING  Goal: Discharge to home or other facility with appropriate resources  Description  INTERVENTIONS:  - Identify barriers to discharge w/patient and caregiver  - Arrange for needed discharge resources and transportation as appropriate  - Identify discharge learning needs (meds, wound care, etc )  - Arrange for interpretive services to assist at discharge as needed  - Refer to Case Management Department for coordinating discharge planning if the patient needs post-hospital services based on physician/advanced practitioner order or complex needs related to functional status, cognitive ability, or social support system  Outcome: Progressing     Problem: Knowledge Deficit  Goal: Patient/family/caregiver demonstrates understanding of disease process, treatment plan, medications, and discharge instructions  Description  Complete learning assessment and assess knowledge base    Interventions:  - Provide teaching at level of understanding  - Provide teaching via preferred learning methods  Outcome: Progressing     Problem: RESPIRATORY - ADULT  Goal: Achieves optimal ventilation and oxygenation  Description  INTERVENTIONS:  - Assess for changes in respiratory status  - Assess for changes in mentation and behavior  - Position to facilitate oxygenation and minimize respiratory effort  - Oxygen administered by appropriate delivery if ordered  - Initiate smoking cessation education as indicated  - Encourage broncho-pulmonary hygiene including cough, deep breathe, Incentive Spirometry  - Assess the need for suctioning and aspirate as needed  - Assess and instruct to report SOB or any respiratory difficulty  - Respiratory Therapy support as indicated  Outcome: Progressing     Problem: GASTROINTESTINAL - ADULT  Goal: Minimal or absence of nausea and/or vomiting  Description  INTERVENTIONS:  - Administer IV fluids if ordered to ensure adequate hydration  - Maintain NPO status until nausea and vomiting are resolved  - Nasogastric tube if ordered  - Administer ordered antiemetic medications as needed  - Provide nonpharmacologic comfort measures as appropriate  - Advance diet as tolerated, if ordered  - Consider nutrition services referral to assist patient with adequate nutrition and appropriate food choices  Outcome: Progressing  Goal: Maintains or returns to baseline bowel function  Description  INTERVENTIONS:  - Assess bowel function  - Encourage oral fluids to ensure adequate hydration  - Administer IV fluids if ordered to ensure adequate hydration  - Administer ordered medications as needed  - Encourage mobilization and activity  - Consider nutritional services referral to assist patient with adequate nutrition and appropriate food choices  Outcome: Progressing  Goal: Maintains adequate nutritional intake  Description  INTERVENTIONS:  - Monitor percentage of each meal consumed  - Identify factors contributing to decreased intake, treat as appropriate  - Assist with meals as needed  - Monitor I&O, weight, and lab values if indicated  - Obtain nutrition services referral as needed  Outcome: Progressing     Problem: METABOLIC, FLUID AND ELECTROLYTES - ADULT  Goal: Electrolytes maintained within normal limits  Description  INTERVENTIONS:  - Monitor labs and assess patient for signs and symptoms of electrolyte imbalances  - Administer electrolyte replacement as ordered  - Monitor response to electrolyte replacements, including repeat lab results as appropriate  - Instruct patient on fluid and nutrition as appropriate  Outcome: Progressing  Goal: Fluid balance maintained  Description  INTERVENTIONS:  - Monitor labs   - Monitor I/O and WT  - Instruct patient on fluid and nutrition as appropriate  - Assess for signs & symptoms of volume excess or deficit  Outcome: Progressing     Problem: MUSCULOSKELETAL - ADULT  Goal: Maintain or return mobility to safest level of function  Description  INTERVENTIONS:  - Assess patient's ability to carry out ADLs; assess patient's baseline for ADL function and identify physical deficits which impact ability to perform ADLs (bathing, care of mouth/teeth, toileting, grooming, dressing, etc )  - Assess/evaluate cause of self-care deficits   - Assess range of motion  - Assess patient's mobility  - Assess patient's need for assistive devices and provide as appropriate  - Encourage maximum independence but intervene and supervise when necessary  - Involve family in performance of ADLs  - Assess for home care needs following discharge   - Consider OT consult to assist with ADL evaluation and planning for discharge  - Provide patient education as appropriate  Outcome: Progressing  Goal: Maintain proper alignment of affected body part  Description  INTERVENTIONS:  - Support, maintain and protect limb and body alignment  - Provide patient/ family with appropriate education  Outcome: Progressing     Problem: CARDIOVASCULAR - ADULT  Goal: Maintains optimal cardiac output and hemodynamic stability  Description  INTERVENTIONS:  - Monitor I/O, vital signs and rhythm  - Monitor for S/S and trends of decreased cardiac output  - Administer and titrate ordered vasoactive medications to optimize hemodynamic stability  - Assess quality of pulses, skin color and temperature  - Assess for signs of decreased coronary artery perfusion  - Instruct patient to report change in severity of symptoms  Outcome: Progressing  Goal: Absence of cardiac dysrhythmias or at baseline rhythm  Description  INTERVENTIONS:  - Continuous cardiac monitoring, vital signs, obtain 12 lead EKG if ordered  - Administer antiarrhythmic and heart rate control medications as ordered  - Monitor electrolytes and administer replacement therapy as ordered  Outcome: Progressing     Problem: Nutrition/Hydration-ADULT  Goal: Nutrient/Hydration intake appropriate for improving, restoring or maintaining nutritional needs  Description  Monitor and assess patient's nutrition/hydration status for malnutrition  Collaborate with interdisciplinary team and initiate plan and interventions as ordered  Monitor patient's weight and dietary intake as ordered or per policy  Utilize nutrition screening tool and intervene as necessary  Determine patient's food preferences and provide high-protein, high-caloric foods as appropriate       INTERVENTIONS:  - Monitor oral intake, urinary output, labs, and treatment plans  - Assess nutrition and hydration status and recommend course of action  - Evaluate amount of meals eaten  - Assist patient with eating if necessary   - Allow adequate time for meals  - Recommend/ encourage appropriate diets, oral nutritional supplements, and vitamin/mineral supplements  - Order, calculate, and assess calorie counts as needed  - Recommend, monitor, and adjust tube feedings and TPN/PPN based on assessed needs  - Assess need for intravenous fluids  - Provide specific nutrition/hydration education as appropriate  - Include patient/family/caregiver in decisions related to nutrition  Outcome: Progressing     Problem: COPING  Goal: Pt/Family able to verbalize concerns and demonstrate effective coping strategies  Description  INTERVENTIONS:  - Assist patient/family to identify coping skills, available support systems and cultural and spiritual values  - Provide emotional support, including active listening and acknowledgement of concerns of patient and caregivers  - Reduce environmental stimuli, as able  - Provide patient education  - Assess for spiritual pain/suffering and initiate spiritual care, including notification of Pastoral Care or [place] based community as needed  - Assess effectiveness of coping strategies  Outcome: Progressing  Goal: Will report anxiety at manageable levels  Description  INTERVENTIONS:  - Administer medication as ordered  - Teach and encourage coping skills  - Provide emotional support  - Assess patient/family for anxiety and ability to cope  Outcome: Progressing

## 2020-02-09 NOTE — PROGRESS NOTES
Progress Note - Acute Pain Service    Temitope Finch 82 y o  female MRN: [id number]  Unit/Bed#: [facility] 802-01 Encounter: [id number]      Assessment:   81 yo female POD #4 ex-lap with billroth 2 gastrojejunostomy  Still NPO with NG tube but patient hopeful it may be removed today  Her primary pain area is her NG associated pain in her head and sinuses  She slept intermittently and she says since her epidural was placed, her pain has improved  Plan:   1  Continue epidural at current settings  2  Cannot restart eliquis until epidural is removed  APS will continue to follow; please contact APS ( [phone]) with any further questions    Pain History  24 hour history: no acute events  24 hour opoid requirement: none    Meds/Allergies   all current active meds have been reviewed    Allergies   Allergen Reactions    Fluticasone        Objective     Temp:  [97 3 °F (36 3 °C)-99 6 °F (37 6 °C)] 98 2 °F (36 8 °C)  HR:  [75-97] 75  Resp:  [18-22] 18  BP: ()/(46-98) 94/46    Physical Exam   Constitutional: She appears well-developed and well-nourished  No distress  HENT:   Head: Normocephalic and atraumatic  Pulmonary/Chest: Effort normal  No stridor  No respiratory distress  Neurological: She is alert  Skin: Skin is warm and dry  Psychiatric: She has a normal mood and affect  Her behavior is normal        Lab Results: I have personally reviewed pertinent labs  Imaging Studies: I have personally reviewed pertinent reports  EKG, Pathology, and Other Studies: I have personally reviewed pertinent reports        Levels of Care: Level 1 = 15 minutes

## 2020-02-10 LAB
ANISOCYTOSIS BLD QL SMEAR: PRESENT
BASOPHILS # BLD MANUAL: 0 THOUSAND/UL (ref 0–0.1)
BASOPHILS NFR MAR MANUAL: 0 % (ref 0–1)
EOSINOPHIL # BLD MANUAL: 0.1 THOUSAND/UL (ref 0–0.4)
EOSINOPHIL NFR BLD MANUAL: 1 % (ref 0–6)
ERYTHROCYTE [DISTWIDTH] IN BLOOD BY AUTOMATED COUNT: 15.8 % (ref 11.6–15.1)
HCT VFR BLD AUTO: 30.6 % (ref 34.8–46.1)
HGB BLD-MCNC: 9.1 G/DL (ref 11.5–15.4)
LYMPHOCYTES # BLD AUTO: 0.88 THOUSAND/UL (ref 0.6–4.47)
LYMPHOCYTES # BLD AUTO: 9 % (ref 14–44)
MCH RBC QN AUTO: 25.1 PG (ref 26.8–34.3)
MCHC RBC AUTO-ENTMCNC: 29.7 G/DL (ref 31.4–37.4)
MCV RBC AUTO: 84 FL (ref 82–98)
MONOCYTES # BLD AUTO: 0.29 THOUSAND/UL (ref 0–1.22)
MONOCYTES NFR BLD: 3 % (ref 4–12)
MYELOCYTES NFR BLD MANUAL: 1 % (ref 0–1)
NEUTROPHILS # BLD MANUAL: 8.25 THOUSAND/UL (ref 1.85–7.62)
NEUTS BAND NFR BLD MANUAL: 1 % (ref 0–8)
NEUTS SEG NFR BLD AUTO: 83 % (ref 43–75)
NRBC BLD AUTO-RTO: 0 /100 WBCS
PLATELET # BLD AUTO: 252 THOUSANDS/UL (ref 149–390)
PLATELET BLD QL SMEAR: ADEQUATE
PMV BLD AUTO: 10.3 FL (ref 8.9–12.7)
POIKILOCYTOSIS BLD QL SMEAR: PRESENT
POLYCHROMASIA BLD QL SMEAR: PRESENT
RBC # BLD AUTO: 3.63 MILLION/UL (ref 3.81–5.12)
RBC MORPH BLD: PRESENT
VARIANT LYMPHS # BLD AUTO: 2 %
WBC # BLD AUTO: 9.82 THOUSAND/UL (ref 4.31–10.16)

## 2020-02-10 PROCEDURE — 94640 AIRWAY INHALATION TREATMENT: CPT

## 2020-02-10 PROCEDURE — 94760 N-INVAS EAR/PLS OXIMETRY 1: CPT

## 2020-02-10 PROCEDURE — NS001 PR NO SIGNATURE OR ATTESTATION: Performed by: SURGERY

## 2020-02-10 PROCEDURE — C9113 INJ PANTOPRAZOLE SODIUM, VIA: HCPCS | Performed by: SURGERY

## 2020-02-10 PROCEDURE — 85027 COMPLETE CBC AUTOMATED: CPT | Performed by: SURGERY

## 2020-02-10 PROCEDURE — 85007 BL SMEAR W/DIFF WBC COUNT: CPT | Performed by: SURGERY

## 2020-02-10 RX ORDER — ACETAMINOPHEN 160 MG/5ML
650 SUSPENSION, ORAL (FINAL DOSE FORM) ORAL EVERY 4 HOURS PRN
Status: DISCONTINUED | OUTPATIENT
Start: 2020-02-10 | End: 2020-02-13

## 2020-02-10 RX ADMIN — METOPROLOL TARTRATE 5 MG: 5 INJECTION, SOLUTION INTRAVENOUS at 14:59

## 2020-02-10 RX ADMIN — DEXTRAN 70 AND HYPROMELLOSE 2910 1 DROP: 1; 3 SOLUTION/ DROPS OPHTHALMIC at 16:05

## 2020-02-10 RX ADMIN — HEPARIN SODIUM 5000 UNITS: 5000 INJECTION INTRAVENOUS; SUBCUTANEOUS at 21:05

## 2020-02-10 RX ADMIN — DEXTRAN 70 AND HYPROMELLOSE 2910 1 DROP: 1; 3 SOLUTION/ DROPS OPHTHALMIC at 08:45

## 2020-02-10 RX ADMIN — ALBUTEROL SULFATE 2 PUFF: 90 AEROSOL, METERED RESPIRATORY (INHALATION) at 21:56

## 2020-02-10 RX ADMIN — IPRATROPIUM BROMIDE 0.5 MG: 0.5 SOLUTION RESPIRATORY (INHALATION) at 19:00

## 2020-02-10 RX ADMIN — ACETAMINOPHEN 650 MG: 650 SUSPENSION ORAL at 23:47

## 2020-02-10 RX ADMIN — SODIUM CHLORIDE, SODIUM LACTATE, POTASSIUM CHLORIDE, AND CALCIUM CHLORIDE 50 ML/HR: .6; .31; .03; .02 INJECTION, SOLUTION INTRAVENOUS at 05:41

## 2020-02-10 RX ADMIN — PANTOPRAZOLE SODIUM 40 MG: 40 INJECTION, POWDER, FOR SOLUTION INTRAVENOUS at 08:45

## 2020-02-10 RX ADMIN — IPRATROPIUM BROMIDE 0.5 MG: 0.5 SOLUTION RESPIRATORY (INHALATION) at 13:35

## 2020-02-10 RX ADMIN — LEVALBUTEROL HYDROCHLORIDE 1.25 MG: 1.25 SOLUTION, CONCENTRATE RESPIRATORY (INHALATION) at 19:00

## 2020-02-10 RX ADMIN — BACITRACIN ZINC, NEOMYCIN SULFATE, AND POLYMYXIN B SULFATE 1 SMALL APPLICATION: 400; 3.5; 5 OINTMENT TOPICAL at 08:45

## 2020-02-10 RX ADMIN — METOCLOPRAMIDE 5 MG: 5 INJECTION, SOLUTION INTRAMUSCULAR; INTRAVENOUS at 02:32

## 2020-02-10 RX ADMIN — ALBUTEROL SULFATE 2 PUFF: 90 AEROSOL, METERED RESPIRATORY (INHALATION) at 02:20

## 2020-02-10 RX ADMIN — DEXTRAN 70 AND HYPROMELLOSE 2910 1 DROP: 1; 3 SOLUTION/ DROPS OPHTHALMIC at 20:28

## 2020-02-10 RX ADMIN — Medication: at 07:15

## 2020-02-10 RX ADMIN — LEVALBUTEROL HYDROCHLORIDE 1.25 MG: 1.25 SOLUTION, CONCENTRATE RESPIRATORY (INHALATION) at 07:09

## 2020-02-10 RX ADMIN — METOCLOPRAMIDE 5 MG: 5 INJECTION, SOLUTION INTRAMUSCULAR; INTRAVENOUS at 20:27

## 2020-02-10 RX ADMIN — PANTOPRAZOLE SODIUM 40 MG: 40 INJECTION, POWDER, FOR SOLUTION INTRAVENOUS at 20:27

## 2020-02-10 RX ADMIN — HEPARIN SODIUM 5000 UNITS: 5000 INJECTION INTRAVENOUS; SUBCUTANEOUS at 14:54

## 2020-02-10 RX ADMIN — METOCLOPRAMIDE 5 MG: 5 INJECTION, SOLUTION INTRAMUSCULAR; INTRAVENOUS at 14:59

## 2020-02-10 RX ADMIN — THIAMINE HYDROCHLORIDE: 100 INJECTION, SOLUTION INTRAMUSCULAR; INTRAVENOUS at 21:04

## 2020-02-10 RX ADMIN — IPRATROPIUM BROMIDE 0.5 MG: 0.5 SOLUTION RESPIRATORY (INHALATION) at 07:09

## 2020-02-10 RX ADMIN — HEPARIN SODIUM 5000 UNITS: 5000 INJECTION INTRAVENOUS; SUBCUTANEOUS at 05:23

## 2020-02-10 RX ADMIN — Medication: at 18:03

## 2020-02-10 RX ADMIN — METOCLOPRAMIDE 5 MG: 5 INJECTION, SOLUTION INTRAMUSCULAR; INTRAVENOUS at 08:45

## 2020-02-10 RX ADMIN — LEVALBUTEROL HYDROCHLORIDE 1.25 MG: 1.25 SOLUTION, CONCENTRATE RESPIRATORY (INHALATION) at 13:35

## 2020-02-10 RX ADMIN — METOPROLOL TARTRATE 5 MG: 5 INJECTION, SOLUTION INTRAVENOUS at 02:32

## 2020-02-10 NOTE — NUTRITION
02/10/20 1508   Recommendations/Interventions   Summary Patient remains NPO, NGT clamp trial today (possible discontinue and advance diet to clear liquid)  TPN continues to provide 100% nutritional needs  Right and left lower extremity trace edema noted  Nursing skin care plan reviewed  Interventions PN continue as ordered   Nutrition Recommendations Continue EN/PN as ordered; Other (specify); Lab - consider order (specify)  (When medically indicated suggest advance diet to clear liquid with ensure clear BID once tolerated advance diet to low fiber/residue, decrease TPN by half and then discontinue   Monitor electrolytes and weight  )

## 2020-02-10 NOTE — PROGRESS NOTES
Progress Note - Acute Pain Service Regional Follow Up  Juanito Phillips 82 y o  female MRN: [id number]  Unit/Bed#: [facility] 802-01 Encounter: [id number]      SURGERY DATE: 2/3/2020  Post-Op Diagnosis Codes:     * Gastric outlet obstruction [K31 1]    Assessment:   81 yo female POD #5 ex-lap with billroth 2 gastrojejunostomy  Still NPO with NG tube  Clamp trial today however not tolerating PO at this time      Plan:   1  Continue epidural (PCEA day 3) at current settings  2  Cannot restart eliquis until epidural is removed  Please call if plan to restart     APS will continue to follow; please contact APS ( btn [phone]) with any further questions    Subjective:  Patient states epidural is working well  Feels relief with each button push  Only complaint is NGT causing discomfort  No other issues, no nausea, no pruritus, no LE weakness  No IV breakthroughs required  Meds/Allergies     all current active meds have been reviewed    Allergies   Allergen Reactions    Fluticasone        Objective     Temp:  [97 7 °F (36 5 °C)-99 7 °F (37 6 °C)] 99 °F (37 2 °C)  HR:  [73-97] 88  Resp:  [17-22] 18  BP: ()/(55-89) 116/71    Physical Exam   Constitutional: She is oriented to person, place, and time  She appears well-developed and well-nourished  HENT:   Head: Normocephalic and atraumatic  Neck: Normal range of motion  Cardiovascular: Normal rate  Pulmonary/Chest: Effort normal    Abdominal: Soft  She exhibits no distension  There is no tenderness  Musculoskeletal: She exhibits no edema  Neurological: She is alert and oriented to person, place, and time  Skin: Skin is warm and dry  Epidural site c/d/i   Psychiatric: She has a normal mood and affect  Her behavior is normal    Nursing note and vitals reviewed

## 2020-02-10 NOTE — RESTORATIVE TECHNICIAN NOTE
Restorative Specialist Mobility Note       Activity: Ambulate in [place]     Assistive Device: Front wheel walker        Repositioned: Supine

## 2020-02-10 NOTE — PROGRESS NOTES
Clamping trial performed for 4 hours with no output when reconnected to suction  Zaire Curran PA-C with red surgery aware  As per Denzel Carbajal, nursing is to remove the patients NG tube  No further orders at this time  Will continue to monitor

## 2020-02-10 NOTE — PHYSICIAN ADVISOR
Current patient class: Inpatient  The patient is currently on Hospital Day: 20      The patient was admitted to the hospital at 2101 on 1/22/20 for the following diagnosis:  Abdominal pain [R10 9]     CMS OUTLIER STAY REVIEW    After review of the relevant documentation, labs, vital signs and test results, the patient is appropriate for CONTINUED INPATIENT ADMISSION  The patient continues to remain hospitalized receiving acute medical care  The patient has surpassed the expected duration of stay, however given the clinical condition, need for further acute care management, the patient is appropriate to remain in an inpatient status  The patient still being actively managed, and does have unresolved medical issues requiring further hospitalization  This review is conducted at 20 days, to help satisfy the requirements for significant outlier stay review as per CMS  Given the current condition of this patient, the patient satisfies this review was determination for continued inpatient stay  Rationale is as follows: The patient has gastric outlet obstruction and is status post exploratory laparotomy, antrectomy, Billroth II gastrojejunostomy  Today there will be a clamp trial of the nasogastric tube  She is on TPN until tolerating adequate oral intake  She still has a CATHERINE drain  The patient continues to receive acute hospital management and remains appropriate for inpatient care      The patients vitals on arrival were ED Triage Vitals   Temperature Pulse Respirations Blood Pressure SpO2   01/22/20 1953 01/22/20 1953 01/22/20 1953 01/22/20 1953 01/22/20 1953   99 1 °F (37 3 °C) (!) 124 (!) 24 96/64 92 %      Temp Source Heart Rate Source Patient Position - Orthostatic VS BP Location FiO2 (%)   01/22/20 1953 01/22/20 1953 01/23/20 1609 01/23/20 1609 01/23/20 0400   Oral Monitor Lying Right arm 35      Pain Score       01/22/20 1953       No Pain           Past Medical History:   Diagnosis Date    Asthma     Atrial fibrillation     Blurred vision     Cardiac disease     Colitis     COPD (chronic obstructive pulmonary disease)     Diverticulosis     DJD (degenerative joint disease)     Emphysema lung     Gait abnormality      Past Surgical History:   Procedure Laterality Date    BLADDER SURGERY      COLON SURGERY      COLONOSCOPY W/ POLYPECTOMY N/A 1/21/2019    Procedure: COLONOSCOPY W/ POLYPECTOMY;  Surgeon: Kaley Prado MD;  Location: [facility] GI LAB; Service: General    GASTROJEJUNOSTOMY W/ JEJUNOSTOMY TUBE N/A 2/3/2020    Procedure: Antrectomy with bilroth II Anastomosis;   Surgeon: Ren Colin MD;  Location:  MAIN OR;  Service: General    SMALL INTESTINE SURGERY             Consults have been placed to:   IP CONSULT TO NEUROSURGERY  IP CONSULT TO CARDIOLOGY  IP CONSULT TO PICC TEAM  IP CONSULT TO SURGICAL ONCOLOGY  IP CONSULT TO ACUTE PAIN SERVICE    Vitals:    02/10/20 0223 02/10/20 0248 02/10/20 0711 02/10/20 1058   BP: 134/88 92/62 91/58 116/71   Pulse: 91 73 82 88   Resp:  17 17 18   Temp:  98 5 °F (36 9 °C) 97 7 °F (36 5 °C) 99 °F (37 2 °C)   TempSrc:       SpO2: 96% 98% 98% 97%   Weight:       Height:           Most recent labs:    Recent Labs     02/09/20  0436 02/10/20  0542   WBC 11 84* 9 82   HGB 9 4* 9 1*   HCT 31 0* 30 6*    252   K 4 1  --    CALCIUM 8 7  --    BUN 12  --    CREATININE 0 42*  --        Scheduled Meds:  Current Facility-Administered Medications:  acetaminophen 650 mg Oral Q4H PRN Benny Simpson MD    Adult TPN (CUSTOM BASE/CUSTOM ELECTROLYTE)  Intravenous Continuous Yolanda Manrique MD Last Rate: 60 mL/hr at 02/09/20 2159   Adult TPN (CUSTOM BASE/CUSTOM ELECTROLYTE)  Intravenous Continuous Hermina Cogan, PA-C    albuterol 2 puff Inhalation Q4H PRN Diane Wyatt DO    bisacodyl 10 mg Rectal Daily Benny Simpson MD    dextran 70-hypromellose 1 drop Both Eyes TID Benny Simpson MD    diazepam 2 5 mg Intravenous Q8H PRN Lele Bolton PA-C    heparin (porcine) 5,000 Units Subcutaneous [facility] Yolanda Lai MD    HYDROmorphone 0 3 mg Intravenous Q2H PRN Reed Wilkins MD    ipratropium 0 5 mg Nebulization TID Yolanda Lai MD    lactated ringers 50 mL/hr Intravenous Continuous Oxana Pagan MD Last Rate: 50 mL/hr (02/10/20 0541)   levalbuterol 1 25 mg Nebulization TID Yolanda Lai MD    metoclopramide 5 mg Intravenous Q6H [address] Lemuel Sweeney PA-C    metoprolol 5 mg Intravenous Q6H PRN Yolanda Lai MD    metoprolol 5 mg Intravenous Q6H Yolanda Lai MD    neomycin-bacitracin-polymyxin b 1 small application Topical Daily Yolanda Lai MD    ondansetron 4 mg Intravenous Q4H PRN Yolanda Lai MD    pantoprazole 40 mg Intravenous Q12H [address] Yolanda Lai MD    phenol 1 spray Mouth/Throat Q2H PRN Yolanda Lai MD    ropivacaine 0 1% and fentaNYL 2 mcg/mL  Epidural Continuous Reed Wilkins MD    saliva substitute 5 spray Mouth/Throat 4x Daily PRN Yolanda Lai MD    simethicone 80 mg Oral Q6H PRN Yolanda Lai MD    sodium chloride 1 spray Each Nare Q1H PRN Yolanda Lai MD      Continuous Infusions:  Adult TPN (CUSTOM BASE/CUSTOM ELECTROLYTE)  Last Rate: 60 mL/hr at 02/09/20 2159   Adult TPN (CUSTOM BASE/CUSTOM ELECTROLYTE)     lactated ringers 50 mL/hr Last Rate: 50 mL/hr (02/10/20 0541)   ropivacaine 0 1% and fentaNYL 2 mcg/mL       PRN Meds:   acetaminophen    albuterol    diazepam    HYDROmorphone    metoprolol    ondansetron    phenol    saliva substitute    simethicone    sodium chloride    Surgical procedures (if appropriate):  Procedure(s):   Antrectomy with bilroth II Anastomosis
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
## 2020-02-10 NOTE — SOCIAL WORK
Patient reviewed in care coordination rounds  Patient not medically clear  Patient remains NPO  Clamp NG tube trial today, begin clear liquids if patient passes clamp trial  CM following

## 2020-02-11 ENCOUNTER — APPOINTMENT (INPATIENT)
Dept: RADIOLOGY | Facility: HOSPITAL | Age: 85
DRG: 326 | End: 2020-02-11
Payer: MEDICARE

## 2020-02-11 LAB
ANION GAP SERPL CALCULATED.3IONS-SCNC: 5 MMOL/L (ref 4–13)
BASO STIPL BLD QL SMEAR: PRESENT
BASOPHILS # BLD MANUAL: 0.11 THOUSAND/UL (ref 0–0.1)
BASOPHILS NFR MAR MANUAL: 1 % (ref 0–1)
BUN SERPL-MCNC: 15 MG/DL (ref 5–25)
CALCIUM SERPL-MCNC: 8.7 MG/DL (ref 8.3–10.1)
CHLORIDE SERPL-SCNC: 105 MMOL/L (ref 100–108)
CO2 SERPL-SCNC: 29 MMOL/L (ref 21–32)
CREAT SERPL-MCNC: 0.46 MG/DL (ref 0.6–1.3)
EOSINOPHIL # BLD MANUAL: 0 THOUSAND/UL (ref 0–0.4)
EOSINOPHIL NFR BLD MANUAL: 0 % (ref 0–6)
ERYTHROCYTE [DISTWIDTH] IN BLOOD BY AUTOMATED COUNT: 15.9 % (ref 11.6–15.1)
GFR SERPL CREATININE-BSD FRML MDRD: 93 ML/MIN/1.73SQ M
GLUCOSE SERPL-MCNC: 77 MG/DL (ref 65–140)
HCT VFR BLD AUTO: 31 % (ref 34.8–46.1)
HGB BLD-MCNC: 9.3 G/DL (ref 11.5–15.4)
LYMPHOCYTES # BLD AUTO: 1 THOUSAND/UL (ref 0.6–4.47)
LYMPHOCYTES # BLD AUTO: 9 % (ref 14–44)
MCH RBC QN AUTO: 25.5 PG (ref 26.8–34.3)
MCHC RBC AUTO-ENTMCNC: 30 G/DL (ref 31.4–37.4)
MCV RBC AUTO: 85 FL (ref 82–98)
METAMYELOCYTES NFR BLD MANUAL: 1 % (ref 0–1)
MONOCYTES # BLD AUTO: 0.33 THOUSAND/UL (ref 0–1.22)
MONOCYTES NFR BLD: 3 % (ref 4–12)
MYELOCYTES NFR BLD MANUAL: 2 % (ref 0–1)
NEUTROPHILS # BLD MANUAL: 9.36 THOUSAND/UL (ref 1.85–7.62)
NEUTS SEG NFR BLD AUTO: 84 % (ref 43–75)
NRBC BLD AUTO-RTO: 0 /100 WBCS
OVALOCYTES BLD QL SMEAR: PRESENT
PLATELET # BLD AUTO: 291 THOUSANDS/UL (ref 149–390)
PLATELET BLD QL SMEAR: ADEQUATE
PMV BLD AUTO: 10.7 FL (ref 8.9–12.7)
POIKILOCYTOSIS BLD QL SMEAR: PRESENT
POLYCHROMASIA BLD QL SMEAR: PRESENT
POTASSIUM SERPL-SCNC: 4.2 MMOL/L (ref 3.5–5.3)
RBC # BLD AUTO: 3.65 MILLION/UL (ref 3.81–5.12)
RBC MORPH BLD: PRESENT
SODIUM SERPL-SCNC: 139 MMOL/L (ref 136–145)
WBC # BLD AUTO: 11.14 THOUSAND/UL (ref 4.31–10.16)

## 2020-02-11 PROCEDURE — 80048 BASIC METABOLIC PNL TOTAL CA: CPT | Performed by: PHYSICIAN ASSISTANT

## 2020-02-11 PROCEDURE — C9113 INJ PANTOPRAZOLE SODIUM, VIA: HCPCS | Performed by: SURGERY

## 2020-02-11 PROCEDURE — 87040 BLOOD CULTURE FOR BACTERIA: CPT | Performed by: SURGERY

## 2020-02-11 PROCEDURE — 94640 AIRWAY INHALATION TREATMENT: CPT

## 2020-02-11 PROCEDURE — NC001 PR NO CHARGE: Performed by: ANESTHESIOLOGY

## 2020-02-11 PROCEDURE — 85007 BL SMEAR W/DIFF WBC COUNT: CPT | Performed by: PHYSICIAN ASSISTANT

## 2020-02-11 PROCEDURE — 71045 X-RAY EXAM CHEST 1 VIEW: CPT

## 2020-02-11 PROCEDURE — 94760 N-INVAS EAR/PLS OXIMETRY 1: CPT

## 2020-02-11 PROCEDURE — 99024 POSTOP FOLLOW-UP VISIT: CPT | Performed by: SURGERY

## 2020-02-11 PROCEDURE — 85027 COMPLETE CBC AUTOMATED: CPT | Performed by: PHYSICIAN ASSISTANT

## 2020-02-11 RX ADMIN — LEVALBUTEROL HYDROCHLORIDE 1.25 MG: 1.25 SOLUTION, CONCENTRATE RESPIRATORY (INHALATION) at 13:27

## 2020-02-11 RX ADMIN — HEPARIN SODIUM 5000 UNITS: 5000 INJECTION INTRAVENOUS; SUBCUTANEOUS at 21:14

## 2020-02-11 RX ADMIN — LEVALBUTEROL HYDROCHLORIDE 1.25 MG: 1.25 SOLUTION, CONCENTRATE RESPIRATORY (INHALATION) at 07:40

## 2020-02-11 RX ADMIN — BACITRACIN ZINC, NEOMYCIN SULFATE, AND POLYMYXIN B SULFATE 1 SMALL APPLICATION: 400; 3.5; 5 OINTMENT TOPICAL at 10:10

## 2020-02-11 RX ADMIN — LEVALBUTEROL HYDROCHLORIDE 1.25 MG: 1.25 SOLUTION, CONCENTRATE RESPIRATORY (INHALATION) at 19:25

## 2020-02-11 RX ADMIN — ALBUTEROL SULFATE 2 PUFF: 90 AEROSOL, METERED RESPIRATORY (INHALATION) at 18:15

## 2020-02-11 RX ADMIN — IPRATROPIUM BROMIDE 0.5 MG: 0.5 SOLUTION RESPIRATORY (INHALATION) at 13:27

## 2020-02-11 RX ADMIN — METOCLOPRAMIDE 5 MG: 5 INJECTION, SOLUTION INTRAMUSCULAR; INTRAVENOUS at 20:23

## 2020-02-11 RX ADMIN — DEXTRAN 70 AND HYPROMELLOSE 2910 1 DROP: 1; 3 SOLUTION/ DROPS OPHTHALMIC at 10:14

## 2020-02-11 RX ADMIN — DIAZEPAM 2.5 MG: 10 INJECTION, SOLUTION INTRAMUSCULAR; INTRAVENOUS at 14:28

## 2020-02-11 RX ADMIN — IPRATROPIUM BROMIDE 0.5 MG: 0.5 SOLUTION RESPIRATORY (INHALATION) at 07:40

## 2020-02-11 RX ADMIN — PANTOPRAZOLE SODIUM 40 MG: 40 INJECTION, POWDER, FOR SOLUTION INTRAVENOUS at 20:23

## 2020-02-11 RX ADMIN — DEXTRAN 70 AND HYPROMELLOSE 2910 1 DROP: 1; 3 SOLUTION/ DROPS OPHTHALMIC at 16:19

## 2020-02-11 RX ADMIN — DEXTRAN 70 AND HYPROMELLOSE 2910 1 DROP: 1; 3 SOLUTION/ DROPS OPHTHALMIC at 20:22

## 2020-02-11 RX ADMIN — ACETAMINOPHEN 650 MG: 650 SUSPENSION ORAL at 23:15

## 2020-02-11 RX ADMIN — HYDROMORPHONE HYDROCHLORIDE 0.3 MG: 1 INJECTION, SOLUTION INTRAMUSCULAR; INTRAVENOUS; SUBCUTANEOUS at 20:42

## 2020-02-11 RX ADMIN — HYDROMORPHONE HYDROCHLORIDE 0.3 MG: 1 INJECTION, SOLUTION INTRAMUSCULAR; INTRAVENOUS; SUBCUTANEOUS at 23:47

## 2020-02-11 RX ADMIN — METOPROLOL TARTRATE 5 MG: 5 INJECTION, SOLUTION INTRAVENOUS at 20:28

## 2020-02-11 RX ADMIN — METOCLOPRAMIDE 5 MG: 5 INJECTION, SOLUTION INTRAMUSCULAR; INTRAVENOUS at 03:13

## 2020-02-11 RX ADMIN — METOPROLOL TARTRATE 5 MG: 5 INJECTION, SOLUTION INTRAVENOUS at 03:13

## 2020-02-11 RX ADMIN — HEPARIN SODIUM 5000 UNITS: 5000 INJECTION INTRAVENOUS; SUBCUTANEOUS at 13:49

## 2020-02-11 RX ADMIN — BISACODYL 10 MG: 10 SUPPOSITORY RECTAL at 10:15

## 2020-02-11 RX ADMIN — METOPROLOL TARTRATE 5 MG: 5 INJECTION, SOLUTION INTRAVENOUS at 16:17

## 2020-02-11 RX ADMIN — HEPARIN SODIUM 5000 UNITS: 5000 INJECTION INTRAVENOUS; SUBCUTANEOUS at 05:07

## 2020-02-11 RX ADMIN — IPRATROPIUM BROMIDE 0.5 MG: 0.5 SOLUTION RESPIRATORY (INHALATION) at 19:25

## 2020-02-11 RX ADMIN — SODIUM CHLORIDE, SODIUM LACTATE, POTASSIUM CHLORIDE, AND CALCIUM CHLORIDE 50 ML/HR: .6; .31; .03; .02 INJECTION, SOLUTION INTRAVENOUS at 02:28

## 2020-02-11 RX ADMIN — THIAMINE HYDROCHLORIDE: 100 INJECTION, SOLUTION INTRAMUSCULAR; INTRAVENOUS at 21:08

## 2020-02-11 RX ADMIN — METOPROLOL TARTRATE 5 MG: 5 INJECTION, SOLUTION INTRAVENOUS at 10:13

## 2020-02-11 RX ADMIN — METOCLOPRAMIDE 5 MG: 5 INJECTION, SOLUTION INTRAMUSCULAR; INTRAVENOUS at 10:11

## 2020-02-11 RX ADMIN — LEVALBUTEROL HYDROCHLORIDE 1.25 MG: 1.25 SOLUTION, CONCENTRATE RESPIRATORY (INHALATION) at 07:19

## 2020-02-11 RX ADMIN — Medication: at 06:00

## 2020-02-11 RX ADMIN — METOCLOPRAMIDE 5 MG: 5 INJECTION, SOLUTION INTRAMUSCULAR; INTRAVENOUS at 16:18

## 2020-02-11 RX ADMIN — PANTOPRAZOLE SODIUM 40 MG: 40 INJECTION, POWDER, FOR SOLUTION INTRAVENOUS at 10:10

## 2020-02-11 RX ADMIN — IPRATROPIUM BROMIDE 0.5 MG: 0.5 SOLUTION RESPIRATORY (INHALATION) at 07:19

## 2020-02-11 NOTE — PROGRESS NOTES
Progress Note - General Surgery   Aminaandrew Agarwal 82 y o  female MRN: [id number]  Unit/Bed#: [facility] 802-01 Encounter: [id number]    Assessment:  83yo F with gastric outlet obstruction s/p ex lap, antrectomy, Billroth II reconstruction  NGT removed yesterday  Plan:  · CXR for febrile episode overnight  · Post-gastrectomy diet as tolerated  · Wean IVF as tolerating PO  · Renew TPN until tolerating adequate PO intake  · Will order calorie count today  · Plan to remove CATHERINE drain later today  · Mobilize patient with PT/OT  · Pain control: epidural per APS  · Monitor & replete electrolytes  · DVT PPx: SQH, SCDs    Subjective/Objective     Subjective:   Tmax 101 2 overnight, defervesced with Tylenol  Otherwise, patient states she feels "damn well" this morning; appears greatly improved from previous exams  Passing flatus, no BM yet  Tolerating PO without N/V  Ambulating with PT      Objective:    Blood pressure 117/59, pulse 93, temperature 99 2 °F (37 3 °C), resp  rate 16, height 5' (1 524 m), weight 64 7 kg (142 lb 11 2 oz), SpO2 97 %, not currently breastfeeding  ,Body mass index is 27 87 kg/m²        Intake/Output Summary (Last 24 hours) at 2/11/2020 0600  Last data filed at 2/11/2020 0510  Gross per 24 hour   Intake 3219 1 ml   Output 1260 ml   Net 1959 1 ml       Invasive Devices     Peripherally Inserted Central Catheter Line            PICC Line 02/01/20 Right Brachial 9 days          Epidural Line            Epidural Catheter 02/07/20 3 days          Drain            Closed/Suction Drain Right RUQ Bulb 10 Fr  7 days    External Urinary Catheter 7 days                Physical Exam:   Gen:  NAD, sitting in chair  CV:  RRR  Lungs: nl effort on 3 L O2 via NC  Abd:  soft, ND, minimal tenderness, incision C/D/I, CATHERINE in place to bulb suction with serosang output   Ext:  no CCE  Neuro: A&Ox3     Results from last 7 days   Lab Units 02/11/20  0503 02/10/20  0542 02/09/20  0436   WBC Thousand/uL 11 14* 9 82 11 84* HEMOGLOBIN g/dL 9 3* 9 1* 9 4*   HEMATOCRIT % 31 0* 30 6* 31 0*   PLATELETS Thousands/uL 291 252 260     Results from last 7 days   Lab Units 02/09/20  0436 02/08/20  0533 02/05/20  0711   POTASSIUM mmol/L 4 1 4 1 4 0   CHLORIDE mmol/L 102 105 104   CO2 mmol/L 29 26 29   BUN mg/dL 12 11 13   CREATININE mg/dL 0 42* 0 39* 0 38*   CALCIUM mg/dL 8 7 8 7 8 3

## 2020-02-11 NOTE — PROGRESS NOTES
Anesthesia Progress Note - Epidural Pain Management    Sravani Nava MRN: [id number]  Unit/Bed#: [facility] 802-01 Encounter: [id number]      Pertinent labs and anticoagulants reviewed  Epidural catheter removed and catheter tip intact  Site of epidural clean, dry without erythema or pus  No DVT PPX for 4 hours after removal  Trauma PA notified      Primary service to resume pain control   (please call  with any questions)      SIGNATURE: Juan Duron MD  DATE: February 11, 2020  TIME: 12:01 PM

## 2020-02-11 NOTE — SOCIAL WORK
Patient reviewed in care coordination rounds  Patient potential d/c for Friday 2/14  No CM needs anticipated  Patient has been ambulating independently  CM following

## 2020-02-11 NOTE — QUICK NOTE
Nurse-Patient-Provider rounds were completed with the patient's nurse today, Genny  We discussed the plan is to  - follow-up blood cultures  - trend fever curve  - follow-up calorie count in p o  Intake  - continue TPN at this time  - follow-up repeat chest x-ray  - out of bed as tolerated  - pull CATHERINE drain today    We reviewed all of the invasive devices/lines/telemetry orders   - None  DVT Prophylaxis:  Subcutaneous heparin and SCDs    Pain Assessment / Plan:  - Continue current analgesic regimen  Continue current regimen  Mobility Assessment / Plan:  - Activity as tolerated  Goals / Barriers for discharge:  Possible DC this Friday; will discuss with case management today     Case management following; case and discharge needs discussed  All questions and concerns were addressed  I spent greater than 18 minutes reviewing the plan with the patient and the nurse, and coordinating care for the day      Martita Maki PA-C  2/11/2020

## 2020-02-11 NOTE — PLAN OF CARE
Problem: Prexisting or High Potential for Compromised Skin Integrity  Goal: Skin integrity is maintained or improved  Description  INTERVENTIONS:  - Identify patients at risk for skin breakdown  - Assess and monitor skin integrity  - Assess and monitor nutrition and hydration status  - Monitor labs   - Assess for incontinence   - Turn and reposition patient  - Assist with mobility/ambulation  - Relieve pressure over bony prominences  - Avoid friction and shearing  - Provide appropriate hygiene as needed including keeping skin clean and dry  - Evaluate need for skin moisturizer/barrier cream  - Collaborate with interdisciplinary team   - Patient/family teaching  - Consider wound care consult   Outcome: Progressing     Problem: Potential for Falls  Goal: Patient will remain free of falls  Description  INTERVENTIONS:  - Assess patient frequently for physical needs  -  Identify cognitive and physical deficits and behaviors that affect risk of falls    -  [place] fall precautions as indicated by assessment   - Educate patient/family on patient safety including physical limitations  - Instruct patient to call for assistance with activity based on assessment  - Modify environment to reduce risk of injury  - Consider OT/PT consult to assist with strengthening/mobility  Outcome: Progressing     Problem: PAIN - ADULT  Goal: Verbalizes/displays adequate comfort level or baseline comfort level  Description  Interventions:  - Encourage patient to monitor pain and request assistance  - Assess pain using appropriate pain scale  - Administer analgesics based on type and severity of pain and evaluate response  - Implement non-pharmacological measures as appropriate and evaluate response  - Consider cultural and social influences on pain and pain management  - Notify physician/advanced practitioner if interventions unsuccessful or patient reports new pain  Outcome: Progressing     Problem: INFECTION - ADULT  Goal: Absence or prevention of progression during hospitalization  Description  INTERVENTIONS:  - Assess and monitor for signs and symptoms of infection  - Monitor lab/diagnostic results  - Monitor all insertion sites, i e  indwelling lines, tubes, and drains  - Monitor endotracheal if appropriate and nasal secretions for changes in amount and color  - [place] appropriate cooling/warming therapies per order  - Administer medications as ordered  - Instruct and encourage patient and family to use good hand hygiene technique  - Identify and instruct in appropriate isolation precautions for identified infection/condition  Outcome: Progressing  Goal: Absence of fever/infection during neutropenic period  Description  INTERVENTIONS:  - Monitor WBC    Outcome: Progressing     Problem: SAFETY ADULT  Goal: Patient will remain free of falls  Description  INTERVENTIONS:  - Assess patient frequently for physical needs  -  Identify cognitive and physical deficits and behaviors that affect risk of falls    -  [place] fall precautions as indicated by assessment   - Educate patient/family on patient safety including physical limitations  - Instruct patient to call for assistance with activity based on assessment  - Modify environment to reduce risk of injury  - Consider OT/PT consult to assist with strengthening/mobility  Outcome: Progressing  Goal: Maintain or return to baseline ADL function  Description  INTERVENTIONS:  -  Assess patient's ability to carry out ADLs; assess patient's baseline for ADL function and identify physical deficits which impact ability to perform ADLs (bathing, care of mouth/teeth, toileting, grooming, dressing, etc )  - Assess/evaluate cause of self-care deficits   - Assess range of motion  - Assess patient's mobility; develop plan if impaired  - Assess patient's need for assistive devices and provide as appropriate  - Encourage maximum independence but intervene and supervise when necessary  - Involve family in performance of ADLs  - Assess for home care needs following discharge   - Consider OT consult to assist with ADL evaluation and planning for discharge  - Provide patient education as appropriate  Outcome: Progressing  Goal: Maintain or return mobility status to optimal level  Description  INTERVENTIONS:  - Assess patient's baseline mobility status (ambulation, transfers, stairs, etc )    - Identify cognitive and physical deficits and behaviors that affect mobility  - Identify mobility aids required to assist with transfers and/or ambulation (gait belt, sit-to-stand, lift, walker, cane, etc )  - [place] fall precautions as indicated by assessment  - Record patient progress and toleration of activity level on Mobility SBAR; progress patient to next Phase/Stage  - Instruct patient to call for assistance with activity based on assessment  - Consider rehabilitation consult to assist with strengthening/weightbearing, etc   Outcome: Progressing     Problem: DISCHARGE PLANNING  Goal: Discharge to home or other facility with appropriate resources  Description  INTERVENTIONS:  - Identify barriers to discharge w/patient and caregiver  - Arrange for needed discharge resources and transportation as appropriate  - Identify discharge learning needs (meds, wound care, etc )  - Arrange for interpretive services to assist at discharge as needed  - Refer to Case Management Department for coordinating discharge planning if the patient needs post-hospital services based on physician/advanced practitioner order or complex needs related to functional status, cognitive ability, or social support system  Outcome: Progressing     Problem: Knowledge Deficit  Goal: Patient/family/caregiver demonstrates understanding of disease process, treatment plan, medications, and discharge instructions  Description  Complete learning assessment and assess knowledge base    Interventions:  - Provide teaching at level of understanding  - Provide teaching via preferred learning methods  Outcome: Progressing     Problem: RESPIRATORY - ADULT  Goal: Achieves optimal ventilation and oxygenation  Description  INTERVENTIONS:  - Assess for changes in respiratory status  - Assess for changes in mentation and behavior  - Position to facilitate oxygenation and minimize respiratory effort  - Oxygen administered by appropriate delivery if ordered  - Initiate smoking cessation education as indicated  - Encourage broncho-pulmonary hygiene including cough, deep breathe, Incentive Spirometry  - Assess the need for suctioning and aspirate as needed  - Assess and instruct to report SOB or any respiratory difficulty  - Respiratory Therapy support as indicated  Outcome: Progressing     Problem: GASTROINTESTINAL - ADULT  Goal: Minimal or absence of nausea and/or vomiting  Description  INTERVENTIONS:  - Administer IV fluids if ordered to ensure adequate hydration  - Maintain NPO status until nausea and vomiting are resolved  - Nasogastric tube if ordered  - Administer ordered antiemetic medications as needed  - Provide nonpharmacologic comfort measures as appropriate  - Advance diet as tolerated, if ordered  - Consider nutrition services referral to assist patient with adequate nutrition and appropriate food choices  Outcome: Progressing  Goal: Maintains or returns to baseline bowel function  Description  INTERVENTIONS:  - Assess bowel function  - Encourage oral fluids to ensure adequate hydration  - Administer IV fluids if ordered to ensure adequate hydration  - Administer ordered medications as needed  - Encourage mobilization and activity  - Consider nutritional services referral to assist patient with adequate nutrition and appropriate food choices  Outcome: Progressing  Goal: Maintains adequate nutritional intake  Description  INTERVENTIONS:  - Monitor percentage of each meal consumed  - Identify factors contributing to decreased intake, treat as appropriate  - Assist with meals as needed  - Monitor I&O, weight, and lab values if indicated  - Obtain nutrition services referral as needed  Outcome: Progressing     Problem: METABOLIC, FLUID AND ELECTROLYTES - ADULT  Goal: Electrolytes maintained within normal limits  Description  INTERVENTIONS:  - Monitor labs and assess patient for signs and symptoms of electrolyte imbalances  - Administer electrolyte replacement as ordered  - Monitor response to electrolyte replacements, including repeat lab results as appropriate  - Instruct patient on fluid and nutrition as appropriate  Outcome: Progressing  Goal: Fluid balance maintained  Description  INTERVENTIONS:  - Monitor labs   - Monitor I/O and WT  - Instruct patient on fluid and nutrition as appropriate  - Assess for signs & symptoms of volume excess or deficit  Outcome: Progressing     Problem: MUSCULOSKELETAL - ADULT  Goal: Maintain or return mobility to safest level of function  Description  INTERVENTIONS:  - Assess patient's ability to carry out ADLs; assess patient's baseline for ADL function and identify physical deficits which impact ability to perform ADLs (bathing, care of mouth/teeth, toileting, grooming, dressing, etc )  - Assess/evaluate cause of self-care deficits   - Assess range of motion  - Assess patient's mobility  - Assess patient's need for assistive devices and provide as appropriate  - Encourage maximum independence but intervene and supervise when necessary  - Involve family in performance of ADLs  - Assess for home care needs following discharge   - Consider OT consult to assist with ADL evaluation and planning for discharge  - Provide patient education as appropriate  Outcome: Progressing  Goal: Maintain proper alignment of affected body part  Description  INTERVENTIONS:  - Support, maintain and protect limb and body alignment  - Provide patient/ family with appropriate education  Outcome: Progressing     Problem: Nutrition/Hydration-ADULT  Goal: Nutrient/Hydration intake appropriate for improving, restoring or maintaining nutritional needs  Description  Monitor and assess patient's nutrition/hydration status for malnutrition  Collaborate with interdisciplinary team and initiate plan and interventions as ordered  Monitor patient's weight and dietary intake as ordered or per policy  Utilize nutrition screening tool and intervene as necessary  Determine patient's food preferences and provide high-protein, high-caloric foods as appropriate       INTERVENTIONS:  - Monitor oral intake, urinary output, labs, and treatment plans  - Assess nutrition and hydration status and recommend course of action  - Evaluate amount of meals eaten  - Assist patient with eating if necessary   - Allow adequate time for meals  - Recommend/ encourage appropriate diets, oral nutritional supplements, and vitamin/mineral supplements  - Order, calculate, and assess calorie counts as needed  - Recommend, monitor, and adjust tube feedings and TPN/PPN based on assessed needs  - Assess need for intravenous fluids  - Provide specific nutrition/hydration education as appropriate  - Include patient/family/caregiver in decisions related to nutrition  Outcome: Progressing     Problem: COPING  Goal: Pt/Family able to verbalize concerns and demonstrate effective coping strategies  Description  INTERVENTIONS:  - Assist patient/family to identify coping skills, available support systems and cultural and spiritual values  - Provide emotional support, including active listening and acknowledgement of concerns of patient and caregivers  - Reduce environmental stimuli, as able  - Provide patient education  - Assess for spiritual pain/suffering and initiate spiritual care, including notification of Pastoral Care or [place] based community as needed  - Assess effectiveness of coping strategies  Outcome: Progressing  Goal: Will report anxiety at manageable levels  Description  INTERVENTIONS:  - Administer medication as ordered  - Teach and encourage coping skills  - Provide emotional support  - Assess patient/family for anxiety and ability to cope  Outcome: Progressing     Problem: CARDIOVASCULAR - ADULT  Goal: Maintains optimal cardiac output and hemodynamic stability  Description  INTERVENTIONS:  - Monitor I/O, vital signs and rhythm  - Monitor for S/S and trends of decreased cardiac output  - Administer and titrate ordered vasoactive medications to optimize hemodynamic stability  - Assess quality of pulses, skin color and temperature  - Assess for signs of decreased coronary artery perfusion  - Instruct patient to report change in severity of symptoms  Outcome: Progressing  Goal: Absence of cardiac dysrhythmias or at baseline rhythm  Description  INTERVENTIONS:  - Continuous cardiac monitoring, vital signs, obtain 12 lead EKG if ordered  - Administer antiarrhythmic and heart rate control medications as ordered  - Monitor electrolytes and administer replacement therapy as ordered  Outcome: Progressing

## 2020-02-11 NOTE — RESTORATIVE TECHNICIAN NOTE
Restorative Specialist Mobility Note       Activity: Ambulate in levin, Chair     Assistive Device: Front wheel walker(4 L O2)        Repositioned: Up in chair, Sitting              Anti-Embolism Device On:  Bilateral, Sequential compression devices, below knee

## 2020-02-11 NOTE — NUTRITION
02/11/20 1348   Assessment   Timepoint Nutrition Review  (Calorie count posted)   Nutrition Prognosis   Nutrition Considerations Other (Comment); Ability to learn  (Verbally reviewed Gastric surgery nutrition guidelines with patient  Written education materials provided for patient's friend to review  Patient has vision loss   )   Recommendations/Interventions   Summary Calorie count posted 2/11-2/12  Patient is agreeable to Ensure enlive supplements  RD encouraged patient to increase po intake of nutrient dense foods and consume supplements in between meals  Interventions Diet: continued as ordered; Supplement initiate;PN continue as ordered  (Ensure enlive TID ordered  )   Nutrition Recommendations Other (specify)  (Will follow up with calorie count results and recommendations  )   Nutrition Complexity Risk   Nutrition complexity level High risk   Nutrition review: 02/12/20  (Calorie count day 1)   Follow up date 02/14/20

## 2020-02-12 LAB
ANION GAP SERPL CALCULATED.3IONS-SCNC: 4 MMOL/L (ref 4–13)
ANISOCYTOSIS BLD QL SMEAR: PRESENT
BASOPHILS # BLD MANUAL: 0 THOUSAND/UL (ref 0–0.1)
BASOPHILS NFR MAR MANUAL: 0 % (ref 0–1)
BUN SERPL-MCNC: 10 MG/DL (ref 5–25)
CALCIUM SERPL-MCNC: 8.7 MG/DL (ref 8.3–10.1)
CHLORIDE SERPL-SCNC: 105 MMOL/L (ref 100–108)
CO2 SERPL-SCNC: 29 MMOL/L (ref 21–32)
CREAT SERPL-MCNC: 0.41 MG/DL (ref 0.6–1.3)
EOSINOPHIL # BLD MANUAL: 0 THOUSAND/UL (ref 0–0.4)
EOSINOPHIL NFR BLD MANUAL: 0 % (ref 0–6)
ERYTHROCYTE [DISTWIDTH] IN BLOOD BY AUTOMATED COUNT: 15.9 % (ref 11.6–15.1)
GFR SERPL CREATININE-BSD FRML MDRD: 97 ML/MIN/1.73SQ M
GLUCOSE SERPL-MCNC: 167 MG/DL (ref 65–140)
HCT VFR BLD AUTO: 32.1 % (ref 34.8–46.1)
HGB BLD-MCNC: 9.8 G/DL (ref 11.5–15.4)
LYMPHOCYTES # BLD AUTO: 0.41 THOUSAND/UL (ref 0.6–4.47)
LYMPHOCYTES # BLD AUTO: 5 % (ref 14–44)
MCH RBC QN AUTO: 25.7 PG (ref 26.8–34.3)
MCHC RBC AUTO-ENTMCNC: 30.5 G/DL (ref 31.4–37.4)
MCV RBC AUTO: 84 FL (ref 82–98)
METAMYELOCYTES NFR BLD MANUAL: 1 % (ref 0–1)
MONOCYTES # BLD AUTO: 0.16 THOUSAND/UL (ref 0–1.22)
MONOCYTES NFR BLD: 2 % (ref 4–12)
NEUTROPHILS # BLD MANUAL: 7.17 THOUSAND/UL (ref 1.85–7.62)
NEUTS BAND NFR BLD MANUAL: 1 % (ref 0–8)
NEUTS SEG NFR BLD AUTO: 86 % (ref 43–75)
NRBC BLD AUTO-RTO: 0 /100 WBCS
PLATELET # BLD AUTO: 318 THOUSANDS/UL (ref 149–390)
PLATELET BLD QL SMEAR: ADEQUATE
PMV BLD AUTO: 10.1 FL (ref 8.9–12.7)
POIKILOCYTOSIS BLD QL SMEAR: PRESENT
POLYCHROMASIA BLD QL SMEAR: PRESENT
POTASSIUM SERPL-SCNC: 3.7 MMOL/L (ref 3.5–5.3)
RBC # BLD AUTO: 3.81 MILLION/UL (ref 3.81–5.12)
RBC MORPH BLD: PRESENT
SODIUM SERPL-SCNC: 138 MMOL/L (ref 136–145)
VARIANT LYMPHS # BLD AUTO: 5 %
WBC # BLD AUTO: 8.24 THOUSAND/UL (ref 4.31–10.16)

## 2020-02-12 PROCEDURE — 99024 POSTOP FOLLOW-UP VISIT: CPT | Performed by: SURGERY

## 2020-02-12 PROCEDURE — C9113 INJ PANTOPRAZOLE SODIUM, VIA: HCPCS | Performed by: SURGERY

## 2020-02-12 PROCEDURE — 85027 COMPLETE CBC AUTOMATED: CPT | Performed by: PHYSICIAN ASSISTANT

## 2020-02-12 PROCEDURE — 80048 BASIC METABOLIC PNL TOTAL CA: CPT | Performed by: PHYSICIAN ASSISTANT

## 2020-02-12 PROCEDURE — 94760 N-INVAS EAR/PLS OXIMETRY 1: CPT

## 2020-02-12 PROCEDURE — 94640 AIRWAY INHALATION TREATMENT: CPT

## 2020-02-12 PROCEDURE — NC001 PR NO CHARGE: Performed by: SURGERY

## 2020-02-12 PROCEDURE — 85007 BL SMEAR W/DIFF WBC COUNT: CPT | Performed by: PHYSICIAN ASSISTANT

## 2020-02-12 PROCEDURE — C9113 INJ PANTOPRAZOLE SODIUM, VIA: HCPCS

## 2020-02-12 RX ORDER — METOCLOPRAMIDE HYDROCHLORIDE 5 MG/ML
INJECTION INTRAMUSCULAR; INTRAVENOUS
Status: COMPLETED
Start: 2020-02-12 | End: 2020-02-12

## 2020-02-12 RX ORDER — POTASSIUM CHLORIDE 20 MEQ/1
40 TABLET, EXTENDED RELEASE ORAL ONCE
Status: COMPLETED | OUTPATIENT
Start: 2020-02-12 | End: 2020-02-12

## 2020-02-12 RX ORDER — HEPARIN SODIUM 5000 [USP'U]/ML
INJECTION, SOLUTION INTRAVENOUS; SUBCUTANEOUS
Status: COMPLETED
Start: 2020-02-12 | End: 2020-02-12

## 2020-02-12 RX ORDER — BACITRACIN, NEOMYCIN, POLYMYXIN B 400; 3.5; 5 [USP'U]/G; MG/G; [USP'U]/G
OINTMENT TOPICAL
Status: COMPLETED
Start: 2020-02-12 | End: 2020-02-12

## 2020-02-12 RX ORDER — PANTOPRAZOLE SODIUM 40 MG/1
INJECTION, POWDER, FOR SOLUTION INTRAVENOUS
Status: COMPLETED
Start: 2020-02-12 | End: 2020-02-12

## 2020-02-12 RX ORDER — LEVALBUTEROL 1.25 MG/.5ML
SOLUTION, CONCENTRATE RESPIRATORY (INHALATION)
Status: COMPLETED
Start: 2020-02-12 | End: 2020-02-12

## 2020-02-12 RX ORDER — METOPROLOL TARTRATE 5 MG/5ML
INJECTION INTRAVENOUS
Status: COMPLETED
Start: 2020-02-12 | End: 2020-02-12

## 2020-02-12 RX ORDER — HYDROMORPHONE HCL/PF 1 MG/ML
SYRINGE (ML) INJECTION
Status: COMPLETED
Start: 2020-02-12 | End: 2020-02-12

## 2020-02-12 RX ADMIN — METOCLOPRAMIDE 5 MG: 5 INJECTION, SOLUTION INTRAMUSCULAR; INTRAVENOUS at 02:27

## 2020-02-12 RX ADMIN — BACITRACIN, NEOMYCIN, POLYMYXIN B 1 SMALL APPLICATION: 400; 3.5; 5 OINTMENT TOPICAL at 10:31

## 2020-02-12 RX ADMIN — DEXTRAN 70 AND HYPROMELLOSE 2910 1 DROP: 1; 3 SOLUTION/ DROPS OPHTHALMIC at 20:29

## 2020-02-12 RX ADMIN — PANTOPRAZOLE SODIUM 40 MG: 40 INJECTION, POWDER, FOR SOLUTION INTRAVENOUS at 10:31

## 2020-02-12 RX ADMIN — METOCLOPRAMIDE 5 MG: 5 INJECTION, SOLUTION INTRAMUSCULAR; INTRAVENOUS at 20:27

## 2020-02-12 RX ADMIN — LEVALBUTEROL HYDROCHLORIDE 1.25 MG: 1.25 SOLUTION, CONCENTRATE RESPIRATORY (INHALATION) at 19:29

## 2020-02-12 RX ADMIN — IPRATROPIUM BROMIDE 0.5 MG: 0.5 SOLUTION RESPIRATORY (INHALATION) at 19:29

## 2020-02-12 RX ADMIN — HYDROMORPHONE HYDROCHLORIDE 0.3 MG: 1 INJECTION, SOLUTION INTRAMUSCULAR; INTRAVENOUS; SUBCUTANEOUS at 07:02

## 2020-02-12 RX ADMIN — DEXTRAN 70 AND HYPROMELLOSE 2910 1 DROP: 1; 3 SOLUTION/ DROPS OPHTHALMIC at 15:26

## 2020-02-12 RX ADMIN — METOPROLOL TARTRATE 5 MG: 1 INJECTION, SOLUTION INTRAVENOUS at 10:30

## 2020-02-12 RX ADMIN — HEPARIN SODIUM 5000 UNITS: 5000 INJECTION INTRAVENOUS; SUBCUTANEOUS at 06:51

## 2020-02-12 RX ADMIN — BACITRACIN ZINC, NEOMYCIN SULFATE, AND POLYMYXIN B SULFATE 1 SMALL APPLICATION: 400; 3.5; 5 OINTMENT TOPICAL at 10:31

## 2020-02-12 RX ADMIN — POTASSIUM CHLORIDE 40 MEQ: 1500 TABLET, EXTENDED RELEASE ORAL at 15:24

## 2020-02-12 RX ADMIN — DIAZEPAM 2.5 MG: 10 INJECTION, SOLUTION INTRAMUSCULAR; INTRAVENOUS at 18:04

## 2020-02-12 RX ADMIN — HEPARIN SODIUM 5000 UNITS: 5000 INJECTION INTRAVENOUS; SUBCUTANEOUS at 22:02

## 2020-02-12 RX ADMIN — DEXTRAN 70 AND HYPROMELLOSE 2910 1 DROP: 1; 3 SOLUTION/ DROPS OPHTHALMIC at 10:32

## 2020-02-12 RX ADMIN — IPRATROPIUM BROMIDE 0.5 MG: 0.5 SOLUTION RESPIRATORY (INHALATION) at 14:34

## 2020-02-12 RX ADMIN — METOCLOPRAMIDE 5 MG: 5 INJECTION, SOLUTION INTRAMUSCULAR; INTRAVENOUS at 10:31

## 2020-02-12 RX ADMIN — METOPROLOL TARTRATE 5 MG: 5 INJECTION, SOLUTION INTRAVENOUS at 10:30

## 2020-02-12 RX ADMIN — HEPARIN SODIUM 5000 UNITS: 5000 INJECTION INTRAVENOUS; SUBCUTANEOUS at 15:23

## 2020-02-12 RX ADMIN — METOPROLOL TARTRATE 5 MG: 5 INJECTION, SOLUTION INTRAVENOUS at 20:28

## 2020-02-12 RX ADMIN — PANTOPRAZOLE SODIUM 40 MG: 40 INJECTION, POWDER, FOR SOLUTION INTRAVENOUS at 20:26

## 2020-02-12 RX ADMIN — HYDROMORPHONE HYDROCHLORIDE 0.3 MG: 1 INJECTION, SOLUTION INTRAMUSCULAR; INTRAVENOUS; SUBCUTANEOUS at 22:39

## 2020-02-12 RX ADMIN — METOPROLOL TARTRATE 5 MG: 5 INJECTION, SOLUTION INTRAVENOUS at 15:23

## 2020-02-12 RX ADMIN — HYDROMORPHONE HYDROCHLORIDE 0.3 MG: 1 INJECTION, SOLUTION INTRAMUSCULAR; INTRAVENOUS; SUBCUTANEOUS at 03:11

## 2020-02-12 RX ADMIN — METOCLOPRAMIDE 5 MG: 5 INJECTION, SOLUTION INTRAMUSCULAR; INTRAVENOUS at 15:24

## 2020-02-12 RX ADMIN — ALBUTEROL SULFATE 2 PUFF: 90 AEROSOL, METERED RESPIRATORY (INHALATION) at 02:57

## 2020-02-12 RX ADMIN — THIAMINE HYDROCHLORIDE: 100 INJECTION, SOLUTION INTRAMUSCULAR; INTRAVENOUS at 22:00

## 2020-02-12 RX ADMIN — METOPROLOL TARTRATE 5 MG: 5 INJECTION, SOLUTION INTRAVENOUS at 02:27

## 2020-02-12 RX ADMIN — LEVALBUTEROL HYDROCHLORIDE 1.25 MG: 1.25 SOLUTION, CONCENTRATE RESPIRATORY (INHALATION) at 14:32

## 2020-02-12 NOTE — PROGRESS NOTES
Nurse / Provider rounds completed with patient and staff nurse, Felix Butt  Called to room, patient felt she could not breathe  Tachy at 114, O2 sats 92%, is on nasal cannula at 2 liters  Became very anxious stating this happens to her at home but she has her inhaler by her if she needs it  Here it is locked up and she gets anxious waiting for the nurse to answer the light and bring it in  Initially increased O2 to 4 liters, had her take deep breaths and use the inhaler  Sats up to 96 percent and less anxious, O2 back down to 2 liters  Will leave inhaler at bedside      5

## 2020-02-12 NOTE — PROGRESS NOTES
Progress Note - General Surgery   Torrie Nascimento 82 y o  female MRN: [id number]  Unit/Bed#: [facility] 802-01 Encounter: [id number]    Assessment:  81yo F with gastric outlet obstruction s/p ex lap, antrectomy, Billroth II reconstruction with postop ileus  Now with return of bowel function  CATHERINE drain, epidural both removed yesterday  Plan:  · Post-gastrectomy diet as tolerated, Ensure supplements TID  · Renew TPN until tolerating adequate PO intake  · F/u calorie count, nutrition recommendations  · Mobilize patient with PT/OT  · PRN analgesia  · DVT PPx: SQH, SCDs    Subjective/Objective     Subjective:   No acute events overnight  Passing flatus, had BM yesterday  Tolerating post-gastrectomy diet without N/V  Reports increased pain since epidural was removed but pain has been controlled with breakthrough IV pain medication  Ambulating with PT     Objective:  Noticed increased serous drainage from caudal aspect of wound this morning  Removed one staple and drained moderate amount of primarily clear serous fluid  Wound was probed using cotton-tipped applicator and fascia was felt to be intact  Wound was then packed using one 4x4 and covered with ABD  Patient tolerated well  Blood pressure 141/74, pulse 91, temperature 98 4 °F (36 9 °C), resp  rate 20, height 5' (1 524 m), weight 64 7 kg (142 lb 11 2 oz), SpO2 95 %, not currently breastfeeding  ,Body mass index is 27 87 kg/m²        Intake/Output Summary (Last 24 hours) at 2/12/2020 5073  Last data filed at 2/12/2020 0159  Gross per 24 hour   Intake 2457 07 ml   Output 3100 ml   Net -642 93 ml       Invasive Devices     Peripherally Inserted Central Catheter Line            PICC Line 02/01/20 Right Brachial 10 days          Drain            External Urinary Catheter 8 days                Physical Exam:   Gen:    NAD, sitting in chair  CV:      RRR  Lungs: nl effort on 2 L O2 via NC  Abd:     soft, ND, minimal tenderness, incision with serous drainage, minimal periwound erythema  Ext:      no CCE  Neuro: A&Ox3     Results from last 7 days   Lab Units 02/11/20  0503 02/10/20  0542 02/09/20  0436   WBC Thousand/uL 11 14* 9 82 11 84*   HEMOGLOBIN g/dL 9 3* 9 1* 9 4*   HEMATOCRIT % 31 0* 30 6* 31 0*   PLATELETS Thousands/uL 291 252 260     Results from last 7 days   Lab Units 02/11/20  0503 02/09/20  0436 02/08/20  0533   POTASSIUM mmol/L 4 2 4 1 4 1   CHLORIDE mmol/L 105 102 105   CO2 mmol/L 29 29 26   BUN mg/dL 15 12 11   CREATININE mg/dL 0 46* 0 42* 0 39*   CALCIUM mg/dL 8 7 8 7 8 7

## 2020-02-12 NOTE — NUTRITION
02/12/20 1348   Assessment   Timepoint Nutrition Review  (Calorie count day 1)   Adequacy of Intake   Nutrition Modality PO  (Post-Gastrectomy, No carbonation, Ensure enlive TID)   Intake Meals 0-25%   Intake Supplements %   Estimated Calorie Intake 50-75%  (63% from po intake)   Estimated Protein Intake  50-75%  (70% from po intake)   Estimated Fluid Intake %  (TPN)   Recommendations/Interventions   Summary Calorie count day 1 results: 1020 calories (64% needs) and 49 grams protein (70% needs)  Patient is taking smaller amounts of food, most of calories are provided from Ensure enlive supplements  Interventions Diet: continued as ordered; Supplement continue   Nutrition Recommendations Continue diet as ordered  (Will follow up with day 2 results and recommendations  )   Nutrition Complexity Risk   Nutrition complexity level High risk   Nutrition review: 02/13/20

## 2020-02-13 ENCOUNTER — APPOINTMENT (INPATIENT)
Dept: RADIOLOGY | Facility: HOSPITAL | Age: 85
DRG: 326 | End: 2020-02-13
Payer: MEDICARE

## 2020-02-13 LAB
ANION GAP SERPL CALCULATED.3IONS-SCNC: 6 MMOL/L (ref 4–13)
BUN SERPL-MCNC: 10 MG/DL (ref 5–25)
CALCIUM SERPL-MCNC: 8.9 MG/DL (ref 8.3–10.1)
CHLORIDE SERPL-SCNC: 103 MMOL/L (ref 100–108)
CO2 SERPL-SCNC: 27 MMOL/L (ref 21–32)
CREAT SERPL-MCNC: 0.35 MG/DL (ref 0.6–1.3)
ERYTHROCYTE [DISTWIDTH] IN BLOOD BY AUTOMATED COUNT: 15.9 % (ref 11.6–15.1)
GFR SERPL CREATININE-BSD FRML MDRD: 102 ML/MIN/1.73SQ M
GLUCOSE SERPL-MCNC: 134 MG/DL (ref 65–140)
HCT VFR BLD AUTO: 32.6 % (ref 34.8–46.1)
HGB BLD-MCNC: 9.7 G/DL (ref 11.5–15.4)
MCH RBC QN AUTO: 25.6 PG (ref 26.8–34.3)
MCHC RBC AUTO-ENTMCNC: 29.8 G/DL (ref 31.4–37.4)
MCV RBC AUTO: 86 FL (ref 82–98)
PLATELET # BLD AUTO: 354 THOUSANDS/UL (ref 149–390)
PMV BLD AUTO: 11.2 FL (ref 8.9–12.7)
POTASSIUM SERPL-SCNC: 4.2 MMOL/L (ref 3.5–5.3)
RBC # BLD AUTO: 3.79 MILLION/UL (ref 3.81–5.12)
SODIUM SERPL-SCNC: 136 MMOL/L (ref 136–145)
WBC # BLD AUTO: 10.43 THOUSAND/UL (ref 4.31–10.16)

## 2020-02-13 PROCEDURE — 85027 COMPLETE CBC AUTOMATED: CPT | Performed by: SURGERY

## 2020-02-13 PROCEDURE — 71046 X-RAY EXAM CHEST 2 VIEWS: CPT

## 2020-02-13 PROCEDURE — 72080 X-RAY EXAM THORACOLMB 2/> VW: CPT

## 2020-02-13 PROCEDURE — C9113 INJ PANTOPRAZOLE SODIUM, VIA: HCPCS | Performed by: SURGERY

## 2020-02-13 PROCEDURE — 94640 AIRWAY INHALATION TREATMENT: CPT

## 2020-02-13 PROCEDURE — 94760 N-INVAS EAR/PLS OXIMETRY 1: CPT

## 2020-02-13 PROCEDURE — 80048 BASIC METABOLIC PNL TOTAL CA: CPT | Performed by: SURGERY

## 2020-02-13 PROCEDURE — 99024 POSTOP FOLLOW-UP VISIT: CPT | Performed by: SURGERY

## 2020-02-13 PROCEDURE — 99232 SBSQ HOSP IP/OBS MODERATE 35: CPT | Performed by: PHYSICIAN ASSISTANT

## 2020-02-13 RX ORDER — OXYCODONE HYDROCHLORIDE 5 MG/1
2.5 TABLET ORAL EVERY 4 HOURS PRN
Status: DISCONTINUED | OUTPATIENT
Start: 2020-02-13 | End: 2020-02-14 | Stop reason: HOSPADM

## 2020-02-13 RX ORDER — OXYCODONE HYDROCHLORIDE 5 MG/1
5 TABLET ORAL EVERY 4 HOURS PRN
Status: DISCONTINUED | OUTPATIENT
Start: 2020-02-13 | End: 2020-02-14 | Stop reason: HOSPADM

## 2020-02-13 RX ORDER — ACETAMINOPHEN 325 MG/1
650 TABLET ORAL EVERY 6 HOURS PRN
Status: DISCONTINUED | OUTPATIENT
Start: 2020-02-13 | End: 2020-02-14 | Stop reason: HOSPADM

## 2020-02-13 RX ORDER — HYDROMORPHONE HYDROCHLORIDE 2 MG/1
1 TABLET ORAL EVERY 4 HOURS PRN
Status: DISCONTINUED | OUTPATIENT
Start: 2020-02-13 | End: 2020-02-13

## 2020-02-13 RX ADMIN — PANTOPRAZOLE SODIUM 40 MG: 40 INJECTION, POWDER, FOR SOLUTION INTRAVENOUS at 09:40

## 2020-02-13 RX ADMIN — DEXTRAN 70 AND HYPROMELLOSE 2910 1 DROP: 1; 3 SOLUTION/ DROPS OPHTHALMIC at 21:49

## 2020-02-13 RX ADMIN — DEXTRAN 70 AND HYPROMELLOSE 2910 1 DROP: 1; 3 SOLUTION/ DROPS OPHTHALMIC at 09:40

## 2020-02-13 RX ADMIN — DIAZEPAM 2.5 MG: 10 INJECTION, SOLUTION INTRAMUSCULAR; INTRAVENOUS at 02:26

## 2020-02-13 RX ADMIN — PANTOPRAZOLE SODIUM 40 MG: 40 INJECTION, POWDER, FOR SOLUTION INTRAVENOUS at 21:44

## 2020-02-13 RX ADMIN — METOCLOPRAMIDE 5 MG: 5 INJECTION, SOLUTION INTRAMUSCULAR; INTRAVENOUS at 09:39

## 2020-02-13 RX ADMIN — DIAZEPAM 2.5 MG: 10 INJECTION, SOLUTION INTRAMUSCULAR; INTRAVENOUS at 15:42

## 2020-02-13 RX ADMIN — LEVALBUTEROL HYDROCHLORIDE 1.25 MG: 1.25 SOLUTION, CONCENTRATE RESPIRATORY (INHALATION) at 19:21

## 2020-02-13 RX ADMIN — BACITRACIN ZINC, NEOMYCIN SULFATE, AND POLYMYXIN B SULFATE 1 SMALL APPLICATION: 400; 3.5; 5 OINTMENT TOPICAL at 09:39

## 2020-02-13 RX ADMIN — HYDROMORPHONE HYDROCHLORIDE 0.3 MG: 1 INJECTION, SOLUTION INTRAMUSCULAR; INTRAVENOUS; SUBCUTANEOUS at 00:49

## 2020-02-13 RX ADMIN — LEVALBUTEROL HYDROCHLORIDE 1.25 MG: 1.25 SOLUTION, CONCENTRATE RESPIRATORY (INHALATION) at 13:33

## 2020-02-13 RX ADMIN — HEPARIN SODIUM 5000 UNITS: 5000 INJECTION INTRAVENOUS; SUBCUTANEOUS at 21:39

## 2020-02-13 RX ADMIN — IPRATROPIUM BROMIDE 0.5 MG: 0.5 SOLUTION RESPIRATORY (INHALATION) at 13:34

## 2020-02-13 RX ADMIN — METOPROLOL TARTRATE 5 MG: 5 INJECTION, SOLUTION INTRAVENOUS at 09:39

## 2020-02-13 RX ADMIN — IPRATROPIUM BROMIDE 0.5 MG: 0.5 SOLUTION RESPIRATORY (INHALATION) at 19:21

## 2020-02-13 RX ADMIN — LEVALBUTEROL HYDROCHLORIDE 1.25 MG: 1.25 SOLUTION, CONCENTRATE RESPIRATORY (INHALATION) at 07:14

## 2020-02-13 RX ADMIN — LIDOCAINE HYDROCHLORIDE 10 ML: 20 SOLUTION ORAL; TOPICAL at 18:34

## 2020-02-13 RX ADMIN — METOPROLOL TARTRATE 5 MG: 5 INJECTION, SOLUTION INTRAVENOUS at 21:44

## 2020-02-13 RX ADMIN — HEPARIN SODIUM 5000 UNITS: 5000 INJECTION INTRAVENOUS; SUBCUTANEOUS at 15:14

## 2020-02-13 RX ADMIN — OXYCODONE HYDROCHLORIDE 5 MG: 5 TABLET ORAL at 23:49

## 2020-02-13 RX ADMIN — Medication 1 SPRAY: at 21:52

## 2020-02-13 RX ADMIN — DIAZEPAM 2.5 MG: 10 INJECTION, SOLUTION INTRAMUSCULAR; INTRAVENOUS at 23:50

## 2020-02-13 RX ADMIN — DEXTRAN 70 AND HYPROMELLOSE 2910 1 DROP: 1; 3 SOLUTION/ DROPS OPHTHALMIC at 15:15

## 2020-02-13 RX ADMIN — IPRATROPIUM BROMIDE 0.5 MG: 0.5 SOLUTION RESPIRATORY (INHALATION) at 07:14

## 2020-02-13 RX ADMIN — HEPARIN SODIUM 5000 UNITS: 5000 INJECTION INTRAVENOUS; SUBCUTANEOUS at 05:02

## 2020-02-13 RX ADMIN — METOCLOPRAMIDE 5 MG: 5 INJECTION, SOLUTION INTRAMUSCULAR; INTRAVENOUS at 21:40

## 2020-02-13 RX ADMIN — METOPROLOL TARTRATE 5 MG: 5 INJECTION, SOLUTION INTRAVENOUS at 15:14

## 2020-02-13 RX ADMIN — Medication 1 SPRAY: at 21:50

## 2020-02-13 RX ADMIN — LIDOCAINE HYDROCHLORIDE 10 ML: 20 SOLUTION ORAL; TOPICAL at 14:12

## 2020-02-13 RX ADMIN — METOPROLOL TARTRATE 5 MG: 5 INJECTION, SOLUTION INTRAVENOUS at 02:28

## 2020-02-13 RX ADMIN — METOCLOPRAMIDE 5 MG: 5 INJECTION, SOLUTION INTRAMUSCULAR; INTRAVENOUS at 15:14

## 2020-02-13 RX ADMIN — METOCLOPRAMIDE 5 MG: 5 INJECTION, SOLUTION INTRAMUSCULAR; INTRAVENOUS at 02:25

## 2020-02-13 NOTE — PROGRESS NOTES
Progress Note - General Surgery   Hannah Newman 82 y o  female MRN: [id number]  Unit/Bed#: [facility] 802-01 Encounter: [id number]    Assessment:  83yo F with gastric outlet obstruction s/p ex lap, antrectomy, Billroth II reconstruction with postop ileus  Now with return of bowel function  Plan:  · Post-gastrectomy diet as tolerated, Ensure supplements TID  · TPN d/c'd  ? F/u nutrition day 2 recommendations  · CXR for continued episodes of SOB  ? May require dose of lasix 20 mg IV pending CXR results  · Abdominal packing changed at bedside this morning  · Wean O2 to baseline 2 L as able  · Mobilize patient with PT/OT  · PRN analgesia, wean IV dilaudid  · DVT PPx: SQH, SCDs  · Dispo planning: CM for VNA, anticipate d/c tomorrow    Subjective/Objective     Subjective:   Patient had episode of SOB overnight, requiring 5 L O2 via NC  Currently with normal WOB and O2 sats mid-90s on 3 L O2  Abdominal pain has been controlled, but requiring breakthrough IV dilaudid  Continues to have bowel function  Ambulating with PT      Objective:    Blood pressure 131/77, pulse 84, temperature 98 5 °F (36 9 °C), resp  rate 20, height 5' (1 524 m), weight 64 7 kg (142 lb 11 2 oz), SpO2 98 %, not currently breastfeeding  ,Body mass index is 27 87 kg/m²        Intake/Output Summary (Last 24 hours) at 2/13/2020 0720  Last data filed at 2/13/2020 0501  Gross per 24 hour   Intake 180 ml   Output 1799 ml   Net -1619 ml       Invasive Devices     Peripherally Inserted Central Catheter Line            PICC Line 02/01/20 Right Brachial 11 days          Drain            External Urinary Catheter 10 days                Physical Exam:   Gen:  NAD  CV:  RRR  Lungs: nl effort on 3 L O2 via NC  Abd:  soft, ND, minimal tenderness, midline incision packed with minimal serous drainage noted  Ext:  BLE pitting edema  Neuro: A&Ox3     Results from last 7 days   Lab Units 02/13/20  0507 02/12/20  0838 02/11/20  0503   WBC Thousand/uL 10 43* 8 24 11 14* HEMOGLOBIN g/dL 9 7* 9 8* 9 3*   HEMATOCRIT % 32 6* 32 1* 31 0*   PLATELETS Thousands/uL 354 318 291     Results from last 7 days   Lab Units 02/12/20  0838 02/11/20  0503 02/09/20  0436   POTASSIUM mmol/L 3 7 4 2 4 1   CHLORIDE mmol/L 105 105 102   CO2 mmol/L 29 29 29   BUN mg/dL 10 15 12   CREATININE mg/dL 0 41* 0 46* 0 42*   CALCIUM mg/dL 8 7 8 7 8 7

## 2020-02-13 NOTE — PROGRESS NOTES
Progress Note - Gail Max 1935, 82 y o  female MRN: [id number]    Unit/Bed#: [facility] 802-01 Encounter: [id number]    Primary Care Provider: No primary care provider on file  Date and time admitted to hospital: 1/22/2020  9:01 PM        Compression fracture of L4 vertebra (HCC)  Assessment & Plan  T12 and L4 compression fracture with no known previous trauma, found incidentally during workup for nausea and vomiting  · Patient admits to 1 month history of back pain, sees chiropractor and wears lumbar brace  · Works as an aide on a school bus    Imaging:   · XR thoracolumbar spine 2/13/2020:  Stable T12 and L4 compression fracture  · TLICS 1    Plan:  · TLSO brace as needed for now while abdominal wound heals, ideally would like to have on any time patient is OOB and HOB > 45 degrees, to some degree abdominal pain is distracting from back pain  Patient states she is unable to put brace on due to pain but is willing to wear it once everything heals on her abdomen  · Lumbar spine precautions  · Pain control per primary team  · DVT ppx: SCDs, heparin    Neurosurgery will follow as needed during hospitalization  No surgical intervention warranted at this time  Outpatient follow up in 2 weeks with repeat upright thoracolumbar imaging - recommend orthotic tech visit to readjust brace at that time  Signed off, please call with questions or concerns  Closed wedge compression fracture of T12 vertebra (HCC)  Assessment & Plan  See plan above    * Gastric outlet obstruction  Assessment & Plan  S/p ex lap, antrectomy, Billroth II reconstruction with postop ileus  Management per primary / surgery team      Subjective/Objective   Chief Complaint: "I am okay "    Subjective:  Patient continues to have abdominal pain  She states she is unable to wear TLSO brace due to abdominal pain and has been ambulating without it    She is willing to wear brace once abdominal wound heals and would like it adjusted once able to  She states abdominal pain is distracting her from back pain, and at this time states back pain is minimal   Has to watch out for the way she turns as this makes back pain worse  Denies bowel / bladder issues, saddle anesthesia  Objective: laying in bed, NAD    I/O       02/11 0701 - 02/12 0700 02/12 0701 - 02/13 0700 02/13 0701 - 02/14 0700    P  O  320 180 240    I V  (mL/kg) 492 4 (7 6)      TPN 1439      Total Intake(mL/kg) 2251 4 (34 8) 180 (2 8) 240 (3 7)    Urine (mL/kg/hr) 3100 (2) 1799 (1 2) 1400 (3)    Drains       Stool  0 0    Total Output 3100 1799 1400    Net -848 6 1619 -1160           Unmeasured Urine Occurrence  2 x     Unmeasured Stool Occurrence  2 x 1 x          Invasive Devices     Peripherally Inserted Central Catheter Line            PICC Line 02/01/20 Right Brachial 12 days          Drain            External Urinary Catheter 10 days                Physical Exam:  Vitals: Blood pressure 127/75, pulse 86, temperature 98 5 °F (36 9 °C), resp  rate 20, height 5' (1 524 m), weight 64 7 kg (142 lb 11 2 oz), SpO2 96 %, not currently breastfeeding  ,Body mass index is 27 87 kg/m²        General appearance: alert, appears stated age, cooperative and no distress  Head: Normocephalic, without obvious abnormality, atraumatic  Eyes: conjugate gaze  Neck: supple, symmetrical, trachea midline and NT  Back: no kyphosis present, no tenderness to percussion or palpation  Lungs: NC in place  Heart: regular heart rate  Abdomen:  Dressing over incision intact  Neurologic:   Mental status: Alert, oriented x 3, follows commands, thought content appropriate  Cranial nerves: grossly intact (Cranial nerves II-XII)  Sensory: normal to LT, JPS 3/3 bilateral hallux, DST intact  Motor: moving all extremities without focal weakness, 4+/5 throughout  Reflexes: 2+ and symmetric, no villanueva or clonus noted  Coordination: finger to nose normal bilaterally, no drift bilaterally      Lab Results:  Results from last 7 days   Lab Units 02/13/20  0507 02/12/20  0838 02/11/20  0503 02/10/20  0542   WBC Thousand/uL 10 43* 8 24 11 14* 9 82   HEMOGLOBIN g/dL 9 7* 9 8* 9 3* 9 1*   HEMATOCRIT % 32 6* 32 1* 31 0* 30 6*   PLATELETS Thousands/uL 354 318 291 252   MONO PCT %  --  2* 3* 3*     Results from last 7 days   Lab Units 02/13/20  0507 02/12/20  0838 02/11/20  0503   POTASSIUM mmol/L 4 2 3 7 4 2   CHLORIDE mmol/L 103 105 105   CO2 mmol/L 27 29 29   BUN mg/dL 10 10 15   CREATININE mg/dL 0 35* 0 41* 0 46*   CALCIUM mg/dL 8 9 8 7 8 7     Results from last 7 days   Lab Units 02/09/20  0436 02/08/20  0533   MAGNESIUM mg/dL 2 2 2 2     Results from last 7 days   Lab Units 02/09/20  0436 02/08/20  0533   PHOSPHORUS mg/dL 3 9 4 5*       Imaging Studies: I have personally reviewed pertinent reports  and I have personally reviewed pertinent films in PACS    Xr Spine Lumbar 2 Or 3 Views Injury    Result Date: 1/24/2020  Impression: No change in spinal alignment or vertebral height since 1/21/2020 Workstation performed: [id number]     Xr Abdomen 1 View Kub    Result Date: 1/28/2020  Impression: 1  The tip of the enteric tube projects at the stomach  2   Paucity of bowel gas, nonspecific  3   Small left pleural effusion  Workstation performed: [id number]     Xr Abdomen 1 View Kub    Result Date: 1/23/2020  Impression: Feeding tube position as described  Workstation performed: [id number]     Xr Chest Portable Icu    Result Date: 1/23/2020  Impression: 1  Diffuse interstitial opacities, with comparatively increased opacities in the left upper lung when compared to the prior study chest x-ray  Consider mild edema 2  Emphysematous changes The study was marked in EPIC for immediate notification  Workstation performed: [id number]       EKG, Pathology, and Other Studies: I have personally reviewed pertinent reports        VTE Pharmacologic Prophylaxis: Heparin    VTE Mechanical Prophylaxis: sequential compression device

## 2020-02-13 NOTE — NUTRITION
02/13/20 1259   Recommendations/Interventions   Summary Calorie count day 2 results not recorded  Per discussion with RN and review of nursing flowsheet po intake remains poor, 0-25% meal completions noted  Patient is consuming 2-3 ensure supplements per day  Per chart review TPN to be discontinued  Right and left lower extremity +2 edema  Nursing skin care plan reviewed  Interventions Diet: continued as ordered; Supplement continue  (RN and PCA continue to assist and encourage improved meal completions  Ensure enlive TID to continue)   Nutrition Recommendations Continue diet as ordered;Lab - consider order (specify); Other (specify)  (Monitor electrolytes and obtain current standing scale weight  )

## 2020-02-14 VITALS
WEIGHT: 142.7 LBS | RESPIRATION RATE: 20 BRPM | BODY MASS INDEX: 28.02 KG/M2 | SYSTOLIC BLOOD PRESSURE: 112 MMHG | DIASTOLIC BLOOD PRESSURE: 72 MMHG | HEIGHT: 60 IN | HEART RATE: 72 BPM | OXYGEN SATURATION: 98 % | TEMPERATURE: 97.6 F

## 2020-02-14 LAB
ANION GAP SERPL CALCULATED.3IONS-SCNC: 7 MMOL/L (ref 4–13)
BUN SERPL-MCNC: 11 MG/DL (ref 5–25)
CALCIUM SERPL-MCNC: 9.2 MG/DL (ref 8.3–10.1)
CHLORIDE SERPL-SCNC: 101 MMOL/L (ref 100–108)
CO2 SERPL-SCNC: 28 MMOL/L (ref 21–32)
CREAT SERPL-MCNC: 0.43 MG/DL (ref 0.6–1.3)
ERYTHROCYTE [DISTWIDTH] IN BLOOD BY AUTOMATED COUNT: 15.9 % (ref 11.6–15.1)
GFR SERPL CREATININE-BSD FRML MDRD: 95 ML/MIN/1.73SQ M
GLUCOSE SERPL-MCNC: 119 MG/DL (ref 65–140)
HCT VFR BLD AUTO: 34 % (ref 34.8–46.1)
HGB BLD-MCNC: 10.1 G/DL (ref 11.5–15.4)
MCH RBC QN AUTO: 25.1 PG (ref 26.8–34.3)
MCHC RBC AUTO-ENTMCNC: 29.7 G/DL (ref 31.4–37.4)
MCV RBC AUTO: 85 FL (ref 82–98)
PLATELET # BLD AUTO: 369 THOUSANDS/UL (ref 149–390)
PMV BLD AUTO: 10.6 FL (ref 8.9–12.7)
POTASSIUM SERPL-SCNC: 4.3 MMOL/L (ref 3.5–5.3)
RBC # BLD AUTO: 4.02 MILLION/UL (ref 3.81–5.12)
SODIUM SERPL-SCNC: 136 MMOL/L (ref 136–145)
WBC # BLD AUTO: 11.34 THOUSAND/UL (ref 4.31–10.16)

## 2020-02-14 PROCEDURE — C9113 INJ PANTOPRAZOLE SODIUM, VIA: HCPCS | Performed by: SURGERY

## 2020-02-14 PROCEDURE — 94760 N-INVAS EAR/PLS OXIMETRY 1: CPT

## 2020-02-14 PROCEDURE — 94640 AIRWAY INHALATION TREATMENT: CPT

## 2020-02-14 PROCEDURE — 99024 POSTOP FOLLOW-UP VISIT: CPT | Performed by: SURGERY

## 2020-02-14 PROCEDURE — 80048 BASIC METABOLIC PNL TOTAL CA: CPT | Performed by: SURGERY

## 2020-02-14 PROCEDURE — NC001 PR NO CHARGE: Performed by: SURGERY

## 2020-02-14 PROCEDURE — 85027 COMPLETE CBC AUTOMATED: CPT | Performed by: SURGERY

## 2020-02-14 RX ORDER — OXYCODONE HYDROCHLORIDE 5 MG/1
2.5 TABLET ORAL EVERY 4 HOURS PRN
Qty: 30 TABLET | Refills: 0 | Status: SHIPPED | OUTPATIENT
Start: 2020-02-14 | End: 2020-02-24

## 2020-02-14 RX ORDER — SIMETHICONE 80 MG
80 TABLET,CHEWABLE ORAL EVERY 6 HOURS PRN
Qty: 30 TABLET | Refills: 0 | Status: SHIPPED | OUTPATIENT
Start: 2020-02-14 | End: 2022-01-01 | Stop reason: HOSPADM

## 2020-02-14 RX ORDER — ACETAMINOPHEN 325 MG/1
650 TABLET ORAL EVERY 6 HOURS PRN
Qty: 30 TABLET | Refills: 0 | Status: SHIPPED | OUTPATIENT
Start: 2020-02-14 | End: 2020-09-27 | Stop reason: ALTCHOICE

## 2020-02-14 RX ADMIN — HEPARIN SODIUM 5000 UNITS: 5000 INJECTION INTRAVENOUS; SUBCUTANEOUS at 05:40

## 2020-02-14 RX ADMIN — METOCLOPRAMIDE 5 MG: 5 INJECTION, SOLUTION INTRAMUSCULAR; INTRAVENOUS at 02:59

## 2020-02-14 RX ADMIN — METOPROLOL TARTRATE 5 MG: 5 INJECTION, SOLUTION INTRAVENOUS at 02:59

## 2020-02-14 RX ADMIN — OXYCODONE HYDROCHLORIDE 5 MG: 5 TABLET ORAL at 09:32

## 2020-02-14 RX ADMIN — METOCLOPRAMIDE 5 MG: 5 INJECTION, SOLUTION INTRAMUSCULAR; INTRAVENOUS at 09:28

## 2020-02-14 RX ADMIN — LEVALBUTEROL HYDROCHLORIDE 1.25 MG: 1.25 SOLUTION, CONCENTRATE RESPIRATORY (INHALATION) at 07:20

## 2020-02-14 RX ADMIN — DEXTRAN 70 AND HYPROMELLOSE 2910 1 DROP: 1; 3 SOLUTION/ DROPS OPHTHALMIC at 09:30

## 2020-02-14 RX ADMIN — BACITRACIN ZINC, NEOMYCIN SULFATE, AND POLYMYXIN B SULFATE 1 SMALL APPLICATION: 400; 3.5; 5 OINTMENT TOPICAL at 09:28

## 2020-02-14 RX ADMIN — IPRATROPIUM BROMIDE 0.5 MG: 0.5 SOLUTION RESPIRATORY (INHALATION) at 13:40

## 2020-02-14 RX ADMIN — METOPROLOL TARTRATE 5 MG: 5 INJECTION, SOLUTION INTRAVENOUS at 09:28

## 2020-02-14 RX ADMIN — PANTOPRAZOLE SODIUM 40 MG: 40 INJECTION, POWDER, FOR SOLUTION INTRAVENOUS at 09:28

## 2020-02-14 RX ADMIN — OXYCODONE HYDROCHLORIDE 5 MG: 5 TABLET ORAL at 03:45

## 2020-02-14 RX ADMIN — ALBUTEROL SULFATE 2 PUFF: 90 AEROSOL, METERED RESPIRATORY (INHALATION) at 06:05

## 2020-02-14 RX ADMIN — IPRATROPIUM BROMIDE 0.5 MG: 0.5 SOLUTION RESPIRATORY (INHALATION) at 07:20

## 2020-02-14 RX ADMIN — LEVALBUTEROL HYDROCHLORIDE 1.25 MG: 1.25 SOLUTION, CONCENTRATE RESPIRATORY (INHALATION) at 13:40

## 2020-02-14 NOTE — SOCIAL WORK
CM met with patient at bedside to discuss VNA care at d/c  Patient prefers [facility] for continued care  Patient has portable o2 for car ride home  Patient has a ride arriving in the afternoon  CM sent referral to  VNA

## 2020-02-14 NOTE — PROGRESS NOTES
Provider rounds completed with staff and case management  Patient dressed and anxious to leave and go home  Pain controlled, remains on her O2 and is continued on it at home  VNA set - up by case management, will also setup follow up surgery appointment

## 2020-02-14 NOTE — PROGRESS NOTES
Upon discharge, pt was transported to car  Patient is currently on 3L O2  Portable oxygen tank was not charged  Pt refusing to wait to charge oxygen tank

## 2020-02-14 NOTE — DISCHARGE SUMMARY
Discharge Summary - Sukumar Marie 82 y o  female MRN: [id number]    Unit/Bed#: [facility] 802-01 Encounter: [id number]    Admission Date:   Admission Orders (From admission, onward)     Ordered        01/22/20 2111  Inpatient Admission  Once                     Admitting Diagnosis: Abdominal pain [R10 9]    HPI: per resident:  Hien Garcia:  " Sukumar Marie is a 82y o  year old female past medical history of emphysema, diverticulosis, COPD, colitis, AFib on Eliquis, prior colon surgery possibly secondary to diverticulitis, who presents from an outside facility at [facility] with gastric outlet obstruction  CT imaging consistent with dilated stomach filled with fluid and food debris, and distended esophagus  Patient underwent EGD at the outside facility, and there was a large amount of gastric debris and food stuff in the stomach, however the pylorus was not visualized  Patient takes Eliquis at home for AFib  Denied any abdominal pain whatsoever on exam today  Explained that she has had multiple episodes of nausea and vomiting over the past week, but had multiple bowel movements yesterday  Denied passing flatus today  Denied any current symptoms of nausea or vomiting, however the NG tube is causing her great amount of discomfort  Denied any fevers, chills, chest pain or shortness of breath today "       Procedures Performed: No orders of the defined types were placed in this encounter  Summary of Hospital Course:  83 y/o female who was transferred with Gastric Outlet Obstruction from [facility]   Multiple episodes of nausea and vomiting week prior to coming to the hospital  Was able to move her bowels several times  Patient is post Hai Rena outlet obstruction and post Exp Lap  Was tolerating a diet and ensure supplements  Worked with therapy, is on continuous NAsal O2, as she is at home     Midline incision has a small opening towards the proximal end which is loosely packed with a 4X4 gauze and then covered with gauze and an abd pad  Dressing to be changed daily  Spoke with case management and VNA will be following  Patient will follow up with Dr Ba Cali in 1-2 weeks  For further details of her stay, please refer to medical records  Significant Findings, Care, Treatment and Services Provided: Xr Spine Lumbar 2 Or 3 Views Injury    Result Date: 1/24/2020  Impression: No change in spinal alignment or vertebral height since 1/21/2020 Workstation performed: [id number]     Xr Abdomen 1 View Kub    Result Date: 1/28/2020  Impression: 1  The tip of the enteric tube projects at the stomach  2   Paucity of bowel gas, nonspecific  3   Small left pleural effusion  Workstation performed: [id number]     Xr Abdomen 1 View Kub    Result Date: 1/23/2020  Impression: Feeding tube position as described  Workstation performed: [id number]     Xr Chest Portable Icu    Result Date: 1/23/2020  Impression: 1  Diffuse interstitial opacities, with comparatively increased opacities in the left upper lung when compared to the prior study chest x-ray  Consider mild edema 2  Emphysematous changes The study was marked in EPIC for immediate notification  Workstation performed: [id number]       Complications: none    Discharge Diagnosis: A-fib  Emphysema  Colitis  Diverticulosis  Gastric Outlet Obstruction    Resolved Problems  Date Reviewed: 2/5/2020    None          Condition at Discharge: stable         Discharge instructions/Information to patient and family:   See after visit summary for information provided to patient and family  Provisions for Follow-Up Care:  See after visit summary for information related to follow-up care and any pertinent home health orders  PCP: No primary care provider on file  Disposition: Home    Planned Readmission: No      Discharge Statement   I spent 28* minutes discharging the patient  This time was spent on the day of discharge   I had direct contact with the patient on the day of discharge  Additional documentation is required if more than 30 minutes were spent on discharge  Discharge Medications:  See after visit summary for reconciled discharge medications provided to patient and family

## 2020-02-14 NOTE — NURSING NOTE
PICC line removed per order without difficulty  No evidence of bleeding  Pressure held for two minutes and occlusive dressing applied

## 2020-02-14 NOTE — PROGRESS NOTES
Progress Note - General Surgery   Magnolia Conklin 82 y o  female MRN: [id number]  Unit/Bed#: [facility] 802-01 Encounter: [id number]    Assessment:  83yo F with gastric outlet obstruction s/p ex lap, antrectomy, Billroth II reconstruction with postop ileus, now with return of bowel function  Patient with increased O2 requirements yesterday i/s/o chronic respiratory failure 2/2 COPD  Plan:  · Post-gastrectomy diet as tolerated, Ensure supplements TID  · Encourage PO intake  · Respiratory protocol, aggressive pulmonary toilet  · Wean O2 to baseline as able  · Abdominal packing to be changed today  · OOB, PT/OT  · PRN analgesia  · DVT PPx: SQH, SCDs  · Dispo: anticipate d/c home today    Subjective/Objective     Subjective:   Patient with episode of hypoxia yesterday afternoon--became tachypneic (RR 26-30), tachycardic, requiring increased O2  No further episodes since that time, currently normal WOB on baseline 2 L O2 with SpO2 97%  Continues to have bowel function  Pain well-controlled  Ambulating  Objective:    Blood pressure 112/58, pulse (!) 110, temperature 98 4 °F (36 9 °C), resp  rate 20, height 5' (1 524 m), weight 64 7 kg (142 lb 11 2 oz), SpO2 100 %, not currently breastfeeding  ,Body mass index is 27 87 kg/m²        Intake/Output Summary (Last 24 hours) at 2/14/2020 0549  Last data filed at 2/14/2020 0545  Gross per 24 hour   Intake 560 ml   Output 3325 ml   Net -2765 ml       Invasive Devices     Peripherally Inserted Central Catheter Line            PICC Line 02/01/20 Right Brachial 12 days          Drain            External Urinary Catheter 10 days                Physical Exam:   Gen:    NAD  CV:      RRR  Lungs: nl effort on 3 L O2 via NC  Abd:     soft, ND, nontender, midline incision packed with 4x4 with some serous drainage noted  Ext:      BLE pitting edema  Neuro: A&Ox3      Results from last 7 days   Lab Units 02/13/20  0507 02/12/20  0838 02/11/20  0503   WBC Thousand/uL 10 43* 8 24 11 14* HEMOGLOBIN g/dL 9 7* 9 8* 9 3*   HEMATOCRIT % 32 6* 32 1* 31 0*   PLATELETS Thousands/uL 354 318 291     Results from last 7 days   Lab Units 02/13/20  0507 02/12/20  0838 02/11/20  0503   POTASSIUM mmol/L 4 2 3 7 4 2   CHLORIDE mmol/L 103 105 105   CO2 mmol/L 27 29 29   BUN mg/dL 10 10 15   CREATININE mg/dL 0 35* 0 41* 0 46*   CALCIUM mg/dL 8 9 8 7 8 7

## 2020-02-14 NOTE — PLAN OF CARE
Problem: Prexisting or High Potential for Compromised Skin Integrity  Goal: Skin integrity is maintained or improved  Description  INTERVENTIONS:  - Identify patients at risk for skin breakdown  - Assess and monitor skin integrity  - Assess and monitor nutrition and hydration status  - Monitor labs   - Assess for incontinence   - Turn and reposition patient  - Assist with mobility/ambulation  - Relieve pressure over bony prominences  - Avoid friction and shearing  - Provide appropriate hygiene as needed including keeping skin clean and dry  - Evaluate need for skin moisturizer/barrier cream  - Collaborate with interdisciplinary team   - Patient/family teaching  - Consider wound care consult   Outcome: Progressing     Problem: Potential for Falls  Goal: Patient will remain free of falls  Description  INTERVENTIONS:  - Assess patient frequently for physical needs  -  Identify cognitive and physical deficits and behaviors that affect risk of falls    -  [place] fall precautions as indicated by assessment   - Educate patient/family on patient safety including physical limitations  - Instruct patient to call for assistance with activity based on assessment  - Modify environment to reduce risk of injury  - Consider OT/PT consult to assist with strengthening/mobility  Outcome: Progressing     Problem: PAIN - ADULT  Goal: Verbalizes/displays adequate comfort level or baseline comfort level  Description  Interventions:  - Encourage patient to monitor pain and request assistance  - Assess pain using appropriate pain scale  - Administer analgesics based on type and severity of pain and evaluate response  - Implement non-pharmacological measures as appropriate and evaluate response  - Consider cultural and social influences on pain and pain management  - Notify physician/advanced practitioner if interventions unsuccessful or patient reports new pain  Outcome: Progressing     Problem: INFECTION - ADULT  Goal: Absence or prevention of progression during hospitalization  Description  INTERVENTIONS:  - Assess and monitor for signs and symptoms of infection  - Monitor lab/diagnostic results  - Monitor all insertion sites, i e  indwelling lines, tubes, and drains  - Monitor endotracheal if appropriate and nasal secretions for changes in amount and color  - [place] appropriate cooling/warming therapies per order  - Administer medications as ordered  - Instruct and encourage patient and family to use good hand hygiene technique  - Identify and instruct in appropriate isolation precautions for identified infection/condition  Outcome: Progressing  Goal: Absence of fever/infection during neutropenic period  Description  INTERVENTIONS:  - Monitor WBC    Outcome: Progressing     Problem: SAFETY ADULT  Goal: Patient will remain free of falls  Description  INTERVENTIONS:  - Assess patient frequently for physical needs  -  Identify cognitive and physical deficits and behaviors that affect risk of falls    -  [place] fall precautions as indicated by assessment   - Educate patient/family on patient safety including physical limitations  - Instruct patient to call for assistance with activity based on assessment  - Modify environment to reduce risk of injury  - Consider OT/PT consult to assist with strengthening/mobility  Outcome: Progressing  Goal: Maintain or return to baseline ADL function  Description  INTERVENTIONS:  -  Assess patient's ability to carry out ADLs; assess patient's baseline for ADL function and identify physical deficits which impact ability to perform ADLs (bathing, care of mouth/teeth, toileting, grooming, dressing, etc )  - Assess/evaluate cause of self-care deficits   - Assess range of motion  - Assess patient's mobility; develop plan if impaired  - Assess patient's need for assistive devices and provide as appropriate  - Encourage maximum independence but intervene and supervise when necessary  - Involve family in performance of ADLs  - Assess for home care needs following discharge   - Consider OT consult to assist with ADL evaluation and planning for discharge  - Provide patient education as appropriate  Outcome: Progressing  Goal: Maintain or return mobility status to optimal level  Description  INTERVENTIONS:  - Assess patient's baseline mobility status (ambulation, transfers, stairs, etc )    - Identify cognitive and physical deficits and behaviors that affect mobility  - Identify mobility aids required to assist with transfers and/or ambulation (gait belt, sit-to-stand, lift, walker, cane, etc )  - [place] fall precautions as indicated by assessment  - Record patient progress and toleration of activity level on Mobility SBAR; progress patient to next Phase/Stage  - Instruct patient to call for assistance with activity based on assessment  - Consider rehabilitation consult to assist with strengthening/weightbearing, etc   Outcome: Progressing     Problem: DISCHARGE PLANNING  Goal: Discharge to home or other facility with appropriate resources  Description  INTERVENTIONS:  - Identify barriers to discharge w/patient and caregiver  - Arrange for needed discharge resources and transportation as appropriate  - Identify discharge learning needs (meds, wound care, etc )  - Arrange for interpretive services to assist at discharge as needed  - Refer to Case Management Department for coordinating discharge planning if the patient needs post-hospital services based on physician/advanced practitioner order or complex needs related to functional status, cognitive ability, or social support system  Outcome: Progressing     Problem: Knowledge Deficit  Goal: Patient/family/caregiver demonstrates understanding of disease process, treatment plan, medications, and discharge instructions  Description  Complete learning assessment and assess knowledge base    Interventions:  - Provide teaching at level of understanding  - Provide teaching via preferred learning methods  Outcome: Progressing     Problem: RESPIRATORY - ADULT  Goal: Achieves optimal ventilation and oxygenation  Description  INTERVENTIONS:  - Assess for changes in respiratory status  - Assess for changes in mentation and behavior  - Position to facilitate oxygenation and minimize respiratory effort  - Oxygen administered by appropriate delivery if ordered  - Initiate smoking cessation education as indicated  - Encourage broncho-pulmonary hygiene including cough, deep breathe, Incentive Spirometry  - Assess the need for suctioning and aspirate as needed  - Assess and instruct to report SOB or any respiratory difficulty  - Respiratory Therapy support as indicated  Outcome: Progressing     Problem: GASTROINTESTINAL - ADULT  Goal: Minimal or absence of nausea and/or vomiting  Description  INTERVENTIONS:  - Administer IV fluids if ordered to ensure adequate hydration  - Maintain NPO status until nausea and vomiting are resolved  - Nasogastric tube if ordered  - Administer ordered antiemetic medications as needed  - Provide nonpharmacologic comfort measures as appropriate  - Advance diet as tolerated, if ordered  - Consider nutrition services referral to assist patient with adequate nutrition and appropriate food choices  Outcome: Progressing  Goal: Maintains or returns to baseline bowel function  Description  INTERVENTIONS:  - Assess bowel function  - Encourage oral fluids to ensure adequate hydration  - Administer IV fluids if ordered to ensure adequate hydration  - Administer ordered medications as needed  - Encourage mobilization and activity  - Consider nutritional services referral to assist patient with adequate nutrition and appropriate food choices  Outcome: Progressing  Goal: Maintains adequate nutritional intake  Description  INTERVENTIONS:  - Monitor percentage of each meal consumed  - Identify factors contributing to decreased intake, treat as appropriate  - Assist with meals as needed  - Monitor I&O, weight, and lab values if indicated  - Obtain nutrition services referral as needed  Outcome: Progressing     Problem: METABOLIC, FLUID AND ELECTROLYTES - ADULT  Goal: Electrolytes maintained within normal limits  Description  INTERVENTIONS:  - Monitor labs and assess patient for signs and symptoms of electrolyte imbalances  - Administer electrolyte replacement as ordered  - Monitor response to electrolyte replacements, including repeat lab results as appropriate  - Instruct patient on fluid and nutrition as appropriate  Outcome: Progressing  Goal: Fluid balance maintained  Description  INTERVENTIONS:  - Monitor labs   - Monitor I/O and WT  - Instruct patient on fluid and nutrition as appropriate  - Assess for signs & symptoms of volume excess or deficit  Outcome: Progressing     Problem: MUSCULOSKELETAL - ADULT  Goal: Maintain or return mobility to safest level of function  Description  INTERVENTIONS:  - Assess patient's ability to carry out ADLs; assess patient's baseline for ADL function and identify physical deficits which impact ability to perform ADLs (bathing, care of mouth/teeth, toileting, grooming, dressing, etc )  - Assess/evaluate cause of self-care deficits   - Assess range of motion  - Assess patient's mobility  - Assess patient's need for assistive devices and provide as appropriate  - Encourage maximum independence but intervene and supervise when necessary  - Involve family in performance of ADLs  - Assess for home care needs following discharge   - Consider OT consult to assist with ADL evaluation and planning for discharge  - Provide patient education as appropriate  Outcome: Progressing  Goal: Maintain proper alignment of affected body part  Description  INTERVENTIONS:  - Support, maintain and protect limb and body alignment  - Provide patient/ family with appropriate education  Outcome: Progressing     Problem: Nutrition/Hydration-ADULT  Goal: Nutrient/Hydration intake appropriate for improving, restoring or maintaining nutritional needs  Description  Monitor and assess patient's nutrition/hydration status for malnutrition  Collaborate with interdisciplinary team and initiate plan and interventions as ordered  Monitor patient's weight and dietary intake as ordered or per policy  Utilize nutrition screening tool and intervene as necessary  Determine patient's food preferences and provide high-protein, high-caloric foods as appropriate       INTERVENTIONS:  - Monitor oral intake, urinary output, labs, and treatment plans  - Assess nutrition and hydration status and recommend course of action  - Evaluate amount of meals eaten  - Assist patient with eating if necessary   - Allow adequate time for meals  - Recommend/ encourage appropriate diets, oral nutritional supplements, and vitamin/mineral supplements  - Order, calculate, and assess calorie counts as needed  - Recommend, monitor, and adjust tube feedings and TPN/PPN based on assessed needs  - Assess need for intravenous fluids  - Provide specific nutrition/hydration education as appropriate  - Include patient/family/caregiver in decisions related to nutrition  Outcome: Progressing     Problem: COPING  Goal: Pt/Family able to verbalize concerns and demonstrate effective coping strategies  Description  INTERVENTIONS:  - Assist patient/family to identify coping skills, available support systems and cultural and spiritual values  - Provide emotional support, including active listening and acknowledgement of concerns of patient and caregivers  - Reduce environmental stimuli, as able  - Provide patient education  - Assess for spiritual pain/suffering and initiate spiritual care, including notification of Pastoral Care or [place] based community as needed  - Assess effectiveness of coping strategies  Outcome: Progressing  Goal: Will report anxiety at manageable levels  Description  INTERVENTIONS:  - Administer medication as ordered  - Teach and encourage coping skills  - Provide emotional support  - Assess patient/family for anxiety and ability to cope  Outcome: Progressing     Problem: CARDIOVASCULAR - ADULT  Goal: Maintains optimal cardiac output and hemodynamic stability  Description  INTERVENTIONS:  - Monitor I/O, vital signs and rhythm  - Monitor for S/S and trends of decreased cardiac output  - Administer and titrate ordered vasoactive medications to optimize hemodynamic stability  - Assess quality of pulses, skin color and temperature  - Assess for signs of decreased coronary artery perfusion  - Instruct patient to report change in severity of symptoms  Outcome: Progressing  Goal: Absence of cardiac dysrhythmias or at baseline rhythm  Description  INTERVENTIONS:  - Continuous cardiac monitoring, vital signs, obtain 12 lead EKG if ordered  - Administer antiarrhythmic and heart rate control medications as ordered  - Monitor electrolytes and administer replacement therapy as ordered  Outcome: Progressing

## 2020-02-16 LAB
BACTERIA BLD CULT: NORMAL
BACTERIA BLD CULT: NORMAL

## 2020-02-21 ENCOUNTER — HOSPITAL ENCOUNTER (EMERGENCY)
Facility: HOSPITAL | Age: 85
Discharge: HOME | End: 2020-02-21
Attending: EMERGENCY MEDICINE | Admitting: EMERGENCY MEDICINE
Payer: MEDICARE

## 2020-02-21 VITALS
HEART RATE: 95 BPM | HEIGHT: 60 IN | DIASTOLIC BLOOD PRESSURE: 62 MMHG | BODY MASS INDEX: 28.86 KG/M2 | SYSTOLIC BLOOD PRESSURE: 130 MMHG | TEMPERATURE: 98.4 F | RESPIRATION RATE: 24 BRPM | WEIGHT: 147 LBS | OXYGEN SATURATION: 90 %

## 2020-02-21 DIAGNOSIS — M54.50 ACUTE LOW BACK PAIN: Primary | ICD-10-CM

## 2020-02-21 PROCEDURE — 99284 EMERGENCY DEPT VISIT MOD MDM: CPT | Performed by: PHYSICIAN ASSISTANT

## 2020-02-21 PROCEDURE — 99283 EMERGENCY DEPT VISIT LOW MDM: CPT

## 2020-02-21 PROCEDURE — 96372 THER/PROPH/DIAG INJ SC/IM: CPT

## 2020-02-21 RX ORDER — ACETAMINOPHEN 325 MG/1
650 TABLET ORAL ONCE
Status: COMPLETED | OUTPATIENT
Start: 2020-02-21 | End: 2020-02-21

## 2020-02-21 RX ORDER — KETOROLAC TROMETHAMINE 30 MG/ML
15 INJECTION, SOLUTION INTRAMUSCULAR; INTRAVENOUS ONCE
Status: COMPLETED | OUTPATIENT
Start: 2020-02-21 | End: 2020-02-21

## 2020-02-21 RX ADMIN — ACETAMINOPHEN 650 MG: 325 TABLET ORAL at 17:00

## 2020-02-21 RX ADMIN — KETOROLAC TROMETHAMINE 15 MG: 30 INJECTION, SOLUTION INTRAMUSCULAR; INTRAVENOUS at 17:00

## 2020-02-21 NOTE — DISCHARGE INSTRUCTIONS
Follow-up with family doctor as planned  Continue physical therapy as planned  And follow-up with your surgeon as planned  You may use heat or ice for low back and Tylenol as needed

## 2020-02-21 NOTE — ED PROVIDER NOTES
History  Chief Complaint   Patient presents with    Back Pain     low back, at the waist line  worse today than normal  has chronic back pain and was being treated at home by PT      82-year-old female history of AFib on Eliquis, COPD, gastric outlet obstruction with recent surgery discharged on 02/13/2020 that presents complaining of low back pain  She states she has been dealing with low back pain for years that comes and goes however it is considerably worse since her discharge from the hospital   She has nurses that come in and changed a wound on her stomach that is partial left open with staples daily  She also sickle therapist that comes to the house daily to treat her low back pain  She reports that her pain has been significantly worse and 1 of the physical therapist recommended that she go to the emergency department to get a shot or something  She denies lower leg weakness, saddle anesthesia or urinary or bowel incontinence  She uses 2 L nasal cannula oxygen at home but not when she is out at work  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 Taking?    ALPRAZolam (XANAX) 0 5 mg tablet   Yes No   Sig: Take 0 5 mg by mouth daily at bedtime as needed   Calcium Carb-Cholecalciferol (CALCIUM 1000 + D PO)   Yes No   Sig: Take 1,000 mg by mouth daily   acetaminophen (TYLENOL) 325 mg tablet   No No   Sig: Take 2 tablets (650 mg total) by mouth every 6 (six) hours as needed for mild pain, headaches or fever   al mag oxide-diphenhydramine-lidocaine viscous (MAGIC MOUTHWASH) 1:1:1 suspension   No No   Sig: Swish and swallow 10 mL every 4 (four) hours as needed for mouth pain or discomfort   albuterol (PROVENTIL HFA,VENTOLIN HFA) 90 mcg/act inhaler   No No   Sig: Inhale 2 puffs every 6 (six) hours as needed for wheezing or shortness of breath   alendronate (FOSAMAX) 70 mg tablet   Yes No   Sig: Take by mouth every 7 days    apixaban (ELIQUIS) 5 mg   No No   Sig: Take 1 tablet (5 mg total) by mouth 2 (two) times a day   cholestyramine sugar free (QUESTRAN LIGHT) 4 g packet   No No   Sig: Take 1 packet (4 g total) by mouth 2 (two) times a day   dextran 70-hypromellose (GENTEAL TEARS) 0 1-0 3 % ophthalmic solution   No No   Sig: Administer 1 drop to both eyes 3 (three) times a day   diltiazem (CARDIZEM CD) 240 mg 24 hr capsule   No No   Sig: Take 1 capsule (240 mg total) by mouth daily   fluticasone-umeclidinium-vilanterol (TRELEGY ELLIPTA) 100-62 5-25 MCG/INH inhaler   Yes No   Si puff daily   ipratropium-albuterol (DUO-NEB) 0 5-2 5 mg/3 mL nebulizer solution   No No   Sig: Take 1 vial (3 mL total) by nebulization every 6 (six) hours while awake   iron polysaccharides (FERREX) 150 mg capsule   No No   Sig: Take 1 capsule (150 mg total) by mouth 2 (two) times a day   loperamide (IMODIUM) 2 mg capsule   No No   Sig: Take 1 capsule (2 mg total) by mouth 3 (three) times a day as needed for diarrhea   metoprolol tartrate (LOPRESSOR) 25 mg tablet   No No   Sig: Take 0 5 tablets (12 5 mg total) by mouth every 12 (twelve) hours   montelukast (SINGULAIR) 10 mg tablet   No No   Sig: Take 1 tablet (10 mg total) by mouth daily at bedtime   oxyCODONE (ROXICODONE) 5 mg immediate release tablet   No No   Sig: Take 0 5 tablets (2 5 mg total) by mouth every 4 (four) hours as needed for moderate pain for up to 10 daysMax Daily Amount: 15 mg   pantoprazole (PROTONIX) 40 mg tablet   No No   Sig: Take 1 tablet (40 mg total) by mouth daily in the early morning   potassium chloride (MICRO-K) 10 MEQ CR capsule   No No   Sig: Take 1 capsule (10 mEq total) by mouth daily   psyllium (METAMUCIL) packet   No No Sig: Take 1 packet by mouth 3 (three) times a day   simethicone (MYLICON) 80 mg chewable tablet   No No   Sig: Chew 1 tablet (80 mg total) every 6 (six) hours as needed for flatulence      Facility-Administered Medications: None       Past Medical History:   Diagnosis Date    Asthma     Atrial fibrillation     Blurred vision     Cardiac disease     Colitis     COPD (chronic obstructive pulmonary disease)     Diverticulosis     DJD (degenerative joint disease)     Emphysema lung     Gait abnormality        Past Surgical History:   Procedure Laterality Date    BLADDER SURGERY      COLON SURGERY      COLONOSCOPY W/ POLYPECTOMY N/A 2019    Procedure: COLONOSCOPY W/ POLYPECTOMY;  Surgeon: Ermelinda Villasenor MD;  Location: [address] [place] [address] GI LAB; Service: General    GASTROJEJUNOSTOMY W/ JEJUNOSTOMY TUBE N/A 2/3/2020    Procedure: Antrectomy with bilroth II Anastomosis; Surgeon: Giorgi Davidson MD;  Location: [facility];  Service: General    SMALL INTESTINE SURGERY         Family History   Problem Relation Age of Onset    Heart disease Mother      I have reviewed and agree with the history as documented  Social History     Tobacco Use    Smoking status: Former Smoker     Last attempt to quit: 2013     Years since quittin 2    Smokeless tobacco: Never Used   Substance Use Topics    Alcohol use: Not Currently     Frequency: Never     Binge frequency: Never    Drug use: Not Currently       Review of Systems   Constitutional: Negative for chills, fatigue and fever  HENT: Negative for ear pain, postnasal drip, rhinorrhea, sinus pressure, sinus pain and sore throat  Eyes: Negative for pain  Respiratory: Negative for cough, shortness of breath and wheezing  Cardiovascular: Negative for chest pain, palpitations and leg swelling  Gastrointestinal: Negative for abdominal pain, constipation, diarrhea, nausea and vomiting  Endocrine: Negative for polyuria     Genitourinary: Negative for dysuria and pelvic pain  Musculoskeletal: Positive for back pain  Negative for arthralgias, myalgias, neck pain and neck stiffness  Neurological: Negative for dizziness, syncope, light-headedness and headaches  All other systems reviewed and are negative  Physical Exam  Physical Exam   Constitutional: She is oriented to person, place, and time  She appears well-developed and well-nourished  HENT:   Head: Normocephalic and atraumatic  Mouth/Throat: No oropharyngeal exudate  Eyes: EOM are normal    Neck: Normal range of motion  Cardiovascular: Normal rate, regular rhythm and normal heart sounds  Pulmonary/Chest: Effort normal and breath sounds normal    Abdominal: Soft  Bowel sounds are normal  There is no tenderness  Midline surgical incision noted with staples in place and proximal open segment of incision  Clean and dry bandage in place and taped down  No signs of infection  Dressing changed  Musculoskeletal: Normal range of motion  Negative straight leg raise test bilaterally  Positive tenderness to palpation in bilateral lumbar musculature  Sensation to light touch grossly intact bilaterally  5/5 strength bilateral lower extremities  Neurological: She is alert and oriented to person, place, and time  Skin: Skin is warm  Capillary refill takes less than 2 seconds  Psychiatric: She has a normal mood and affect         Vital Signs  ED Triage Vitals [02/21/20 1540]   Temperature Pulse Respirations Blood Pressure SpO2   98 4 °F (36 9 °C) 95 (!) 24 130/62 90 %      Temp Source Heart Rate Source Patient Position - Orthostatic VS BP Location FiO2 (%)   Temporal Monitor Sitting Left arm --      Pain Score       Worst Possible Pain           Vitals:    02/21/20 1540   BP: 130/62   Pulse: 95   Patient Position - Orthostatic VS: Sitting         Visual Acuity      ED Medications  Medications   acetaminophen (TYLENOL) tablet 650 mg (650 mg Oral Given 2/21/20 1700)   ketorolac (TORADOL) injection 15 mg (15 mg Intramuscular Given 2/21/20 1700)       Diagnostic Studies  Results Reviewed     None                 No orders to display              Procedures  Procedures         ED Course                               MDM  Number of Diagnoses or Management Options  Acute low back pain:   Diagnosis management comments: 29-year-old female presented with complaints of low back pain  This isn't in acute on chronic issue and she has daily physical therapy at home  She has no complaints regarding her recent surgery  She was given Tylenol for pain  Toradol was administered  Careful consideration was taken regarding the risks and benefits of using Toradol but the patient reported no contraindications and although there is a mild increased bleeding risk the patient is taking Eliquis currently with no bleeding issues regarding her current open wound  Patient tolerated the Toradol well and stated she felt great after receiving Toradol and Tylenol and would like to go home  Disposition  Final diagnoses:   Acute low back pain     Time reflects when diagnosis was documented in both MDM as applicable and the Disposition within this note     Time User Action Codes Description Comment    2/21/2020  5:22 PM Debra Braga Add [M54 5] Acute low back pain       ED Disposition     ED Disposition Condition Date/Time Comment    Discharge Stable Fri Feb 21, 2020  5:22 PM Pastor Garcia discharge to home/self care  Follow-up Information     Follow up With Specialties Details Why Contact Info    Jessica Hollins, DO Internal Medicine  As needed [address] [facility]  [phone]            Patient's Medications   Discharge Prescriptions    No medications on file     No discharge procedures on file      PDMP Review       Value Time User    PDMP Reviewed  Yes 2/14/2020 10:04 AM [address], [address]          ED Provider  Electronically Signed by           Pippa Jaramillo PA-C  02/21/20 1415

## 2020-02-23 ENCOUNTER — APPOINTMENT (EMERGENCY)
Dept: CT IMAGING | Facility: HOSPITAL | Age: 85
End: 2020-02-23
Payer: MEDICARE

## 2020-02-23 ENCOUNTER — HOSPITAL ENCOUNTER (EMERGENCY)
Facility: HOSPITAL | Age: 85
Discharge: HOME | End: 2020-02-23
Attending: EMERGENCY MEDICINE | Admitting: EMERGENCY MEDICINE
Payer: MEDICARE

## 2020-02-23 VITALS
SYSTOLIC BLOOD PRESSURE: 118 MMHG | TEMPERATURE: 98 F | HEART RATE: 85 BPM | RESPIRATION RATE: 18 BRPM | OXYGEN SATURATION: 98 % | HEIGHT: 60 IN | WEIGHT: 147 LBS | DIASTOLIC BLOOD PRESSURE: 64 MMHG | BODY MASS INDEX: 28.86 KG/M2

## 2020-02-23 DIAGNOSIS — S32.010S COMPRESSION FRACTURE OF FIRST LUMBAR VERTEBRA, SEQUELA: ICD-10-CM

## 2020-02-23 DIAGNOSIS — S22.000A COMPRESSION FRACTURE OF BODY OF THORACIC VERTEBRA (HCC): Primary | ICD-10-CM

## 2020-02-23 LAB
ANION GAP SERPL CALCULATED.3IONS-SCNC: 8 MMOL/L (ref 4–13)
BACTERIA UR QL AUTO: ABNORMAL /HPF
BILIRUB UR QL STRIP: NEGATIVE
BUN SERPL-MCNC: 9 MG/DL (ref 7–25)
CALCIUM SERPL-MCNC: 7.9 MG/DL (ref 8.6–10.5)
CHLORIDE SERPL-SCNC: 103 MMOL/L (ref 98–107)
CLARITY UR: CLEAR
CO2 SERPL-SCNC: 24 MMOL/L (ref 21–31)
COLOR UR: YELLOW
CREAT SERPL-MCNC: 0.39 MG/DL (ref 0.6–1.2)
EOSINOPHIL # BLD AUTO: 0.15 THOUSAND/UL (ref 0–0.61)
EOSINOPHIL NFR BLD MANUAL: 2 % (ref 0–6)
ERYTHROCYTE [DISTWIDTH] IN BLOOD BY AUTOMATED COUNT: 17.1 % (ref 11.5–14.5)
GFR SERPL CREATININE-BSD FRML MDRD: 98 ML/MIN/1.73SQ M
GLUCOSE SERPL-MCNC: 104 MG/DL (ref 65–99)
GLUCOSE UR STRIP-MCNC: NEGATIVE MG/DL
HCT VFR BLD AUTO: 32.6 % (ref 42–47)
HGB BLD-MCNC: 10.3 G/DL (ref 12–16)
HGB UR QL STRIP.AUTO: NEGATIVE
KETONES UR STRIP-MCNC: ABNORMAL MG/DL
LEUKOCYTE ESTERASE UR QL STRIP: ABNORMAL
LYMPHOCYTES # BLD AUTO: 1.5 THOUSAND/UL (ref 0.6–4.47)
LYMPHOCYTES # BLD AUTO: 20 % (ref 20–51)
MCH RBC QN AUTO: 26 PG (ref 26–34)
MCHC RBC AUTO-ENTMCNC: 31.7 G/DL (ref 31–37)
MCV RBC AUTO: 82 FL (ref 81–99)
MONOCYTES # BLD AUTO: 0.68 THOUSAND/UL (ref 0–1.22)
MONOCYTES NFR BLD AUTO: 9 % (ref 4–12)
NEUTS SEG # BLD: 5.18 THOUSAND/UL (ref 1.81–6.82)
NEUTS SEG NFR BLD AUTO: 69 % (ref 42–75)
NITRITE UR QL STRIP: NEGATIVE
NON-SQ EPI CELLS URNS QL MICRO: ABNORMAL /HPF
PH UR STRIP.AUTO: 6 [PH]
PLATELET # BLD AUTO: 393 THOUSANDS/UL (ref 149–390)
PLATELET BLD QL SMEAR: ADEQUATE
PMV BLD AUTO: 7 FL (ref 8.6–11.7)
POTASSIUM SERPL-SCNC: 3.5 MMOL/L (ref 3.5–5.5)
PROT UR STRIP-MCNC: NEGATIVE MG/DL
RBC # BLD AUTO: 3.98 MILLION/UL (ref 3.9–5.2)
RBC #/AREA URNS AUTO: ABNORMAL /HPF
SODIUM SERPL-SCNC: 135 MMOL/L (ref 134–143)
SP GR UR STRIP.AUTO: 1.02 (ref 1–1.03)
TOTAL CELLS COUNTED SPEC: 100
UROBILINOGEN UR QL STRIP.AUTO: 0.2 E.U./DL
WBC # BLD AUTO: 7.5 THOUSAND/UL (ref 4.8–10.8)
WBC #/AREA URNS AUTO: ABNORMAL /HPF

## 2020-02-23 PROCEDURE — 99284 EMERGENCY DEPT VISIT MOD MDM: CPT

## 2020-02-23 PROCEDURE — 80048 BASIC METABOLIC PNL TOTAL CA: CPT | Performed by: EMERGENCY MEDICINE

## 2020-02-23 PROCEDURE — 72131 CT LUMBAR SPINE W/O DYE: CPT

## 2020-02-23 PROCEDURE — 99283 EMERGENCY DEPT VISIT LOW MDM: CPT | Performed by: EMERGENCY MEDICINE

## 2020-02-23 PROCEDURE — 36415 COLL VENOUS BLD VENIPUNCTURE: CPT | Performed by: EMERGENCY MEDICINE

## 2020-02-23 PROCEDURE — 85027 COMPLETE CBC AUTOMATED: CPT | Performed by: EMERGENCY MEDICINE

## 2020-02-23 PROCEDURE — 81001 URINALYSIS AUTO W/SCOPE: CPT

## 2020-02-23 PROCEDURE — 85007 BL SMEAR W/DIFF WBC COUNT: CPT | Performed by: EMERGENCY MEDICINE

## 2020-02-23 RX ORDER — OXYCODONE HYDROCHLORIDE AND ACETAMINOPHEN 5; 325 MG/1; MG/1
1 TABLET ORAL ONCE
Status: COMPLETED | OUTPATIENT
Start: 2020-02-23 | End: 2020-02-23

## 2020-02-23 RX ORDER — HYDROCODONE BITARTRATE AND ACETAMINOPHEN 5; 325 MG/1; MG/1
1 TABLET ORAL ONCE
Status: COMPLETED | OUTPATIENT
Start: 2020-02-23 | End: 2020-02-23

## 2020-02-23 RX ADMIN — HYDROCODONE BITARTRATE AND ACETAMINOPHEN 1 TABLET: 5; 325 TABLET ORAL at 04:12

## 2020-02-23 RX ADMIN — OXYCODONE HYDROCHLORIDE AND ACETAMINOPHEN 1 TABLET: 5; 325 TABLET ORAL at 06:41

## 2020-02-23 NOTE — DISCHARGE INSTRUCTIONS
If you have uncontrolled pain, would like to be admitted for the fractures, have new or changing symptoms, trouble walking, leakage of urine or stool, pain shooting down the legs, sensation changes or weakness return to er

## 2020-02-23 NOTE — ED PROVIDER NOTES
History  Chief Complaint   Patient presents with    Back Pain     lower across both sides     With lower back pain that she reported as chronic, worse over the last few days  She states the pain is worse with getting out of bed but after this she is ambulating well  She denies trauma, falls, fever, reticular sx, sensation changes, focal weakness and incontinences  Prior to Admission Medications   Prescriptions Last Dose Informant Patient Reported? Taking?    ALPRAZolam (XANAX) 0 5 mg tablet   Yes No   Sig: Take 0 5 mg by mouth daily at bedtime as needed   Calcium Carb-Cholecalciferol (CALCIUM 1000 + D PO)   Yes No   Sig: Take 1,000 mg by mouth daily   acetaminophen (TYLENOL) 325 mg tablet   No No   Sig: Take 2 tablets (650 mg total) by mouth every 6 (six) hours as needed for mild pain, headaches or fever   al mag oxide-diphenhydramine-lidocaine viscous (MAGIC MOUTHWASH) 1:1:1 suspension   No No   Sig: Swish and swallow 10 mL every 4 (four) hours as needed for mouth pain or discomfort   albuterol (PROVENTIL HFA,VENTOLIN HFA) 90 mcg/act inhaler   No No   Sig: Inhale 2 puffs every 6 (six) hours as needed for wheezing or shortness of breath   alendronate (FOSAMAX) 70 mg tablet   Yes No   Sig: Take by mouth every 7 days    apixaban (ELIQUIS) 5 mg   No No   Sig: Take 1 tablet (5 mg total) by mouth 2 (two) times a day   cholestyramine sugar free (QUESTRAN LIGHT) 4 g packet   No No   Sig: Take 1 packet (4 g total) by mouth 2 (two) times a day   dextran 70-hypromellose (GENTEAL TEARS) 0 1-0 3 % ophthalmic solution   No No   Sig: Administer 1 drop to both eyes 3 (three) times a day   diltiazem (CARDIZEM CD) 240 mg 24 hr capsule   No No   Sig: Take 1 capsule (240 mg total) by mouth daily   fluticasone-umeclidinium-vilanterol (TRELEGY ELLIPTA) 100-62 5-25 MCG/INH inhaler   Yes No   Si puff daily   ipratropium-albuterol (DUO-NEB) 0 5-2 5 mg/3 mL nebulizer solution   No No   Sig: Take 1 vial (3 mL total) by nebulization every 6 (six) hours while awake   iron polysaccharides (FERREX) 150 mg capsule   No No   Sig: Take 1 capsule (150 mg total) by mouth 2 (two) times a day   loperamide (IMODIUM) 2 mg capsule   No No   Sig: Take 1 capsule (2 mg total) by mouth 3 (three) times a day as needed for diarrhea   metoprolol tartrate (LOPRESSOR) 25 mg tablet   No No   Sig: Take 0 5 tablets (12 5 mg total) by mouth every 12 (twelve) hours   montelukast (SINGULAIR) 10 mg tablet   No No   Sig: Take 1 tablet (10 mg total) by mouth daily at bedtime   oxyCODONE (ROXICODONE) 5 mg immediate release tablet   No No   Sig: Take 0 5 tablets (2 5 mg total) by mouth every 4 (four) hours as needed for moderate pain for up to 10 daysMax Daily Amount: 15 mg   pantoprazole (PROTONIX) 40 mg tablet   No No   Sig: Take 1 tablet (40 mg total) by mouth daily in the early morning   potassium chloride (MICRO-K) 10 MEQ CR capsule   No No   Sig: Take 1 capsule (10 mEq total) by mouth daily   psyllium (METAMUCIL) packet   No No   Sig: Take 1 packet by mouth 3 (three) times a day   simethicone (MYLICON) 80 mg chewable tablet   No No   Sig: Chew 1 tablet (80 mg total) every 6 (six) hours as needed for flatulence      Facility-Administered Medications: None       Past Medical History:   Diagnosis Date    Asthma     Atrial fibrillation     Blurred vision     Cardiac disease     Colitis     COPD (chronic obstructive pulmonary disease)     Diverticulosis     DJD (degenerative joint disease)     Emphysema lung     Gait abnormality        Past Surgical History:   Procedure Laterality Date    BLADDER SURGERY      COLON SURGERY      COLONOSCOPY W/ POLYPECTOMY N/A 1/21/2019    Procedure: COLONOSCOPY W/ POLYPECTOMY;  Surgeon: Veronica Cedeño MD;  Location: [address] [place] [address] GI LAB; Service: General    GASTROJEJUNOSTOMY W/ JEJUNOSTOMY TUBE N/A 2/3/2020    Procedure: Antrectomy with bilroth II Anastomosis;   Surgeon: Romelia Rodgers MD;  Location: [facility] OR;  Service: General    SMALL INTESTINE SURGERY         Family History   Problem Relation Age of Onset    Heart disease Mother      I have reviewed and agree with the history as documented  Social History     Tobacco Use    Smoking status: Former Smoker     Last attempt to quit: 2013     Years since quittin 2    Smokeless tobacco: Never Used   Substance Use Topics    Alcohol use: Not Currently     Frequency: Never     Binge frequency: Never    Drug use: Not Currently       Review of Systems   All other systems reviewed and are negative  Physical Exam  Physical Exam   Constitutional: She is oriented to person, place, and time  She appears well-developed and well-nourished  HENT:   Head: Normocephalic and atraumatic  Right Ear: External ear normal    Left Ear: External ear normal    Eyes: Pupils are equal, round, and reactive to light  Right eye exhibits no discharge  Left eye exhibits no discharge  Neck: Normal range of motion  Neck supple  No JVD present  Cardiovascular: Normal rate, regular rhythm and normal heart sounds  Exam reveals no gallop and no friction rub  No murmur heard  Pulmonary/Chest: Effort normal and breath sounds normal  No stridor  No respiratory distress  She has no wheezes  She has no rales  She exhibits no tenderness  Abdominal: She exhibits no distension  There is no tenderness  There is no rebound  No hernia  Musculoskeletal: Normal range of motion  She exhibits no edema, tenderness or deformity  Upper lumbar tenderness and tenderness to the bl Si joints  She has strong motor to the LE's, intact sensation  Neurological: She is alert and oriented to person, place, and time  She displays normal reflexes  No cranial nerve deficit or sensory deficit  She exhibits normal muscle tone  Coordination normal    Skin: Skin is warm  Capillary refill takes less than 2 seconds  No rash noted  No erythema  Psychiatric: She has a normal mood and affect         Vital Signs  ED Triage Vitals [02/23/20 0356]   Temperature Pulse Respirations Blood Pressure SpO2   98 °F (36 7 °C) 98 18 124/64 97 %      Temp Source Heart Rate Source Patient Position - Orthostatic VS BP Location FiO2 (%)   Tympanic Monitor Sitting Left arm --      Pain Score       Worst Possible Pain           Vitals:    02/23/20 0356 02/23/20 0736   BP: 124/64 118/64   Pulse: 98 85   Patient Position - Orthostatic VS: Sitting Sitting         Visual Acuity      ED Medications  Medications   HYDROcodone-acetaminophen (NORCO) 5-325 mg per tablet 1 tablet (1 tablet Oral Given 2/23/20 0412)   oxyCODONE-acetaminophen (PERCOCET) 5-325 mg per tablet 1 tablet (1 tablet Oral Given 2/23/20 0641)       Diagnostic Studies  Results Reviewed     Procedure Component Value Units Date/Time    Urine Microscopic [[id number]]  (Abnormal) Collected:  02/23/20 0546    Lab Status:  Final result Specimen:  Urine, Clean Catch Updated:  02/23/20 0624     RBC, UA 0-1 /hpf      WBC, UA 4-10 /hpf      Epithelial Cells Occasional /hpf      Bacteria, UA None Seen /hpf     UA (URINE) with reflex to Scope [[id number]]  (Abnormal) Collected:  02/23/20 0546    Lab Status:  Final result Specimen:  Urine, Clean Catch Updated:  02/23/20 0559     Color, UA Yellow     Clarity, UA Clear     Specific [place], UA 1 020     pH, UA 6 0     Leukocytes, UA Trace     Nitrite, UA Negative     Protein, UA Negative mg/dl      Glucose, UA Negative mg/dl      Ketones, UA Trace mg/dl      Urobilinogen, UA 0 2 E U /dl      Bilirubin, UA Negative     Blood, UA Negative    Basic metabolic panel [[id number]]  (Abnormal) Collected:  02/23/20 0420    Lab Status:  Final result Specimen:  Blood from Arm, Left Updated:  02/23/20 0502     Sodium 135 mmol/L      Potassium 3 5 mmol/L      Chloride 103 mmol/L      CO2 24 mmol/L      ANION GAP 8 mmol/L      BUN 9 mg/dL      Creatinine 0 39 mg/dL      Glucose 104 mg/dL      Calcium 7 9 mg/dL      eGFR 98 ml/min/1 73sq m     Narrative: [place] guidelines for Chronic Kidney Disease (CKD):     Stage 1 with normal or high GFR (GFR > 90 mL/min/1 73 square meters)    Stage 2 Mild CKD (GFR = 60-89 mL/min/1 73 square meters)    Stage 3A Moderate CKD (GFR = 45-59 mL/min/1 73 square meters)    Stage 3B Moderate CKD (GFR = 30-44 mL/min/1 73 square meters)    Stage 4 Severe CKD (GFR = 15-29 mL/min/1 73 square meters)    Stage 5 End Stage CKD (GFR <15 mL/min/1 73 square meters)  Note: GFR calculation is accurate only with a steady state creatinine    CBC and differential [[id number]]  (Abnormal) Collected:  02/23/20 0420    Lab Status:  Final result Specimen:  Blood from Arm, Left Updated:  02/23/20 0447     WBC 7 50 Thousand/uL      RBC 3 98 Million/uL      Hemoglobin 10 3 g/dL      Hematocrit 32 6 %      MCV 82 fL      MCH 26 0 pg      MCHC 31 7 g/dL      RDW 17 1 %      MPV 7 0 fL      Platelets 557 Thousands/uL                  CT spine lumbar without contrast   Final Result by Denita Gaming MD (02/23 0511)      L1 compression deformity with 20% loss of height  T12 compression deformity 15% loss of height  Stable L4 compression deformity  I personally discussed this study with Viraj Marie on 2/23/2020 at 5:06 AM                Workstation performed: [id number]                    Procedures  Procedures         ED Course                               MDM  Number of Diagnoses or Management Options  Compression fracture of body of thoracic vertebra [facility]): Compression fracture of first lumbar vertebra, sequela:   Diagnosis management comments: With chronic lower back pain that is worse over the last few days  She denies trauma, sensation changes, weakness  She states this is a pain issue  I have noted CT  I have strong rec admission for further evaluation and pain control and she has refused, she is aware this is dangerous   She states she wants to  deal with this as an out pt as she has been in hospital to much recently  She states she has plenty of help at home, is with walker and ambulating well  Again, admission offered, risk vs rewards explained and she states she will be going home today  She further states she refuses to see a dr outside of [facility]  I am a physician treating a patient in an emergency department  under circumstances when I reasonably  have determined that electronically prescribing a controlled substance would be impractical for the patient to obtain the controlled substance prescribed by electronic prescription or would cause an untimely delay resulting in an adverse impact on the patient's medical condition  UA noted, will send for cx, no uti sx, ua ordered for lower back pain  In the face of her compression fractures I do not feel this is the etiology of her pain  Disposition  Final diagnoses:   Compression fracture of body of thoracic vertebra (HCC)   Compression fracture of first lumbar vertebra, sequela     Time reflects when diagnosis was documented in both MDM as applicable and the Disposition within this note     Time User Action Codes Description Comment    2/23/2020  5:31 AM Raji Vargas Add [S22 000A] Compression fracture of body of thoracic vertebra ([address] )     2/23/2020  5:31 AM Raji Vargas Add [S32 010S] Compression fracture of first lumbar vertebra, sequela       ED Disposition     ED Disposition Condition Date/Time Comment    Discharge Stable Sun Feb 23, 2020  5:31 AM Temitope Finch discharge to home/self care              Follow-up Information     Follow up With Specialties Details Why Contact Info Additional Patricia 27, DO Internal Medicine Schedule an appointment as soon as possible for a visit in 2 days  [place]  [address] [place] [address] Physical Therapy Schedule an appointment as soon as possible for a visit in 1 day  [phone]    Linda Gomes Rene Rowell,  Orthopedic Surgery   [address]   [facility]  [address] [address]   [phone]             Discharge Medication List as of 2/23/2020  6:20 AM      CONTINUE these medications which have NOT CHANGED    Details   acetaminophen (TYLENOL) 325 mg tablet Take 2 tablets (650 mg total) by mouth every 6 (six) hours as needed for mild pain, headaches or fever, Starting Fri 2/14/2020, Normal      al mag oxide-diphenhydramine-lidocaine viscous (MAGIC MOUTHWASH) 1:1:1 suspension Swish and swallow 10 mL every 4 (four) hours as needed for mouth pain or discomfort, Starting Fri 2/14/2020, Normal      albuterol (PROVENTIL HFA,VENTOLIN HFA) 90 mcg/act inhaler Inhale 2 puffs every 6 (six) hours as needed for wheezing or shortness of breath, Starting Tue 11/12/2019, Print      alendronate (FOSAMAX) 70 mg tablet Take by mouth every 7 days , Starting Tue 11/19/2019, Historical Med      ALPRAZolam (XANAX) 0 5 mg tablet Take 0 5 mg by mouth daily at bedtime as needed, Historical Med      apixaban (ELIQUIS) 5 mg Take 1 tablet (5 mg total) by mouth 2 (two) times a day, Starting Mon 12/9/2019, Normal      Calcium Carb-Cholecalciferol (CALCIUM 1000 + D PO) Take 1,000 mg by mouth daily, Starting Wed 3/28/2018, Historical Med      cholestyramine sugar free (QUESTRAN LIGHT) 4 g packet Take 1 packet (4 g total) by mouth 2 (two) times a day, Starting Tue 11/12/2019, Print      dextran 70-hypromellose (GENTEAL TEARS) 0 1-0 3 % ophthalmic solution Administer 1 drop to both eyes 3 (three) times a day, Starting Fri 2/14/2020, Normal      diltiazem (CARDIZEM CD) 240 mg 24 hr capsule Take 1 capsule (240 mg total) by mouth daily, Starting Mon 12/9/2019, Normal      fluticasone-umeclidinium-vilanterol (TRELEGY ELLIPTA) 100-62 5-25 MCG/INH inhaler 1 puff daily, Starting Wed 9/4/2019, Historical Med      ipratropium-albuterol (DUO-NEB) 0 5-2 5 mg/3 mL nebulizer solution Take 1 vial (3 mL total) by nebulization every 6 (six) hours while awake, Starting Tue 11/12/2019, Print      iron polysaccharides (FERREX) 150 mg capsule Take 1 capsule (150 mg total) by mouth 2 (two) times a day, Starting Tue 11/12/2019, Print      loperamide (IMODIUM) 2 mg capsule Take 1 capsule (2 mg total) by mouth 3 (three) times a day as needed for diarrhea, Starting Tue 11/12/2019, Print      metoprolol tartrate (LOPRESSOR) 25 mg tablet Take 0 5 tablets (12 5 mg total) by mouth every 12 (twelve) hours, Starting Mon 12/9/2019, Normal      montelukast (SINGULAIR) 10 mg tablet Take 1 tablet (10 mg total) by mouth daily at bedtime, Starting Tue 11/12/2019, Print      oxyCODONE (ROXICODONE) 5 mg immediate release tablet Take 0 5 tablets (2 5 mg total) by mouth every 4 (four) hours as needed for moderate pain for up to 10 daysMax Daily Amount: 15 mg, Starting Fri 2/14/2020, Until Mon 2/24/2020, Normal      pantoprazole (PROTONIX) 40 mg tablet Take 1 tablet (40 mg total) by mouth daily in the early morning, Starting Wed 11/13/2019, Print      potassium chloride (MICRO-K) 10 MEQ CR capsule Take 1 capsule (10 mEq total) by mouth daily, Starting Tue 11/12/2019, Print      psyllium (METAMUCIL) packet Take 1 packet by mouth 3 (three) times a day, Starting Tue 11/12/2019, Print      simethicone (MYLICON) 80 mg chewable tablet Chew 1 tablet (80 mg total) every 6 (six) hours as needed for flatulence, Starting Fri 2/14/2020, Normal           No discharge procedures on file      PDMP Review       Value Time User    PDMP Reviewed  Yes 2/14/2020 10:04 AM 0[address] [facility] Provider  Electronically Signed by           Tiffanie Allison MD  02/23/20 5630

## 2020-02-23 NOTE — ED NOTES
Pt oob observed by this rn  With walker, pt required no assistance  Pt stated she had pain but dinah from bed in a single fluid motion  Pt is alert and oriented x 3  Walked to bathroom and provided urine  sample  Pt appears safe for discharged       Stephenie Mead RN  02/23/20 4390

## 2020-02-24 ENCOUNTER — TELEPHONE (OUTPATIENT)
Dept: PHYSICAL THERAPY | Facility: OTHER | Age: 85
End: 2020-02-24

## 2020-02-24 NOTE — TELEPHONE ENCOUNTER
This nurse did review in detail the comp spine program and what we can provide for the pt for their back pain  Pt starting yelling at this nurse when we reviewed that she is receiving home health services and has a nurse coming into the home to provide services for the Pt  Pt stated, "I can't get a hold of anyone, no answers there phone ", and I'm not going out side of [place] to see a anyone for one visit " This nurse did review with Pt that she needs to discuss physical therapy with her visiting nurse and to show them her discharge paper from her recent E R  Visit  Pt stated that she is a lot of pain, and it is not on her discharge paper work that she was advised to stay for pain management, for her compression fractures  Pt refused and returned home  This nurse did review with Pt that she has an appointment with Neurosurgeon on 2/27/20, and that they left her a voice mail for her, on 2/17/20  Pt is refusing PM&R and again stated that she does not want to leave [place]  Pt was given the ph # to Dr Gregg Pickett, which is on her AVS  Reviewed with Pt that he is an Orthopedic surgeon  Pt stated thank you and hung up  No referral noted for this Pt, and none were placed by this nurse

## 2020-02-26 ENCOUNTER — OFFICE VISIT (OUTPATIENT)
Dept: SURGERY | Facility: CLINIC | Age: 85
End: 2020-02-26

## 2020-02-26 ENCOUNTER — APPOINTMENT (OUTPATIENT)
Dept: LAB | Facility: CLINIC | Age: 85
End: 2020-02-26
Payer: MEDICARE

## 2020-02-26 VITALS
TEMPERATURE: 96.4 F | HEIGHT: 60 IN | WEIGHT: 147 LBS | SYSTOLIC BLOOD PRESSURE: 114 MMHG | BODY MASS INDEX: 28.86 KG/M2 | RESPIRATION RATE: 20 BRPM | DIASTOLIC BLOOD PRESSURE: 72 MMHG | HEART RATE: 86 BPM

## 2020-02-26 DIAGNOSIS — T81.30XA ABDOMINAL WOUND DEHISCENCE, INITIAL ENCOUNTER: Primary | ICD-10-CM

## 2020-02-26 DIAGNOSIS — T81.30XA ABDOMINAL WOUND DEHISCENCE, INITIAL ENCOUNTER: ICD-10-CM

## 2020-02-26 LAB
ALBUMIN SERPL BCP-MCNC: 2.7 G/DL (ref 3.5–5)
ALP SERPL-CCNC: 191 U/L (ref 46–116)
ALT SERPL W P-5'-P-CCNC: 25 U/L (ref 12–78)
ANION GAP SERPL CALCULATED.3IONS-SCNC: 6 MMOL/L (ref 4–13)
AST SERPL W P-5'-P-CCNC: 16 U/L (ref 5–45)
BASOPHILS # BLD AUTO: 0.02 THOUSANDS/ΜL (ref 0–0.1)
BASOPHILS NFR BLD AUTO: 0 % (ref 0–1)
BILIRUB SERPL-MCNC: 0.27 MG/DL (ref 0.2–1)
BUN SERPL-MCNC: 7 MG/DL (ref 5–25)
CALCIUM SERPL-MCNC: 8.5 MG/DL (ref 8.3–10.1)
CHLORIDE SERPL-SCNC: 106 MMOL/L (ref 100–108)
CO2 SERPL-SCNC: 26 MMOL/L (ref 21–32)
CREAT SERPL-MCNC: 0.42 MG/DL (ref 0.6–1.3)
EOSINOPHIL # BLD AUTO: 0.11 THOUSAND/ΜL (ref 0–0.61)
EOSINOPHIL NFR BLD AUTO: 2 % (ref 0–6)
ERYTHROCYTE [DISTWIDTH] IN BLOOD BY AUTOMATED COUNT: 16.8 % (ref 11.6–15.1)
GFR SERPL CREATININE-BSD FRML MDRD: 96 ML/MIN/1.73SQ M
GLUCOSE SERPL-MCNC: 97 MG/DL (ref 65–140)
HCT VFR BLD AUTO: 35.9 % (ref 34.8–46.1)
HGB BLD-MCNC: 10.4 G/DL (ref 11.5–15.4)
IMM GRANULOCYTES # BLD AUTO: 0.08 THOUSAND/UL (ref 0–0.2)
IMM GRANULOCYTES NFR BLD AUTO: 1 % (ref 0–2)
LYMPHOCYTES # BLD AUTO: 1.55 THOUSANDS/ΜL (ref 0.6–4.47)
LYMPHOCYTES NFR BLD AUTO: 23 % (ref 14–44)
MCH RBC QN AUTO: 25.1 PG (ref 26.8–34.3)
MCHC RBC AUTO-ENTMCNC: 29 G/DL (ref 31.4–37.4)
MCV RBC AUTO: 87 FL (ref 82–98)
MONOCYTES # BLD AUTO: 0.8 THOUSAND/ΜL (ref 0.17–1.22)
MONOCYTES NFR BLD AUTO: 12 % (ref 4–12)
NEUTROPHILS # BLD AUTO: 4.22 THOUSANDS/ΜL (ref 1.85–7.62)
NEUTS SEG NFR BLD AUTO: 62 % (ref 43–75)
NRBC BLD AUTO-RTO: 0 /100 WBCS
PLATELET # BLD AUTO: 346 THOUSANDS/UL (ref 149–390)
PMV BLD AUTO: 9.4 FL (ref 8.9–12.7)
POTASSIUM SERPL-SCNC: 3.7 MMOL/L (ref 3.5–5.3)
PROT SERPL-MCNC: 7 G/DL (ref 6.4–8.2)
RBC # BLD AUTO: 4.15 MILLION/UL (ref 3.81–5.12)
SODIUM SERPL-SCNC: 138 MMOL/L (ref 136–145)
WBC # BLD AUTO: 6.78 THOUSAND/UL (ref 4.31–10.16)

## 2020-02-26 PROCEDURE — 85025 COMPLETE CBC W/AUTO DIFF WBC: CPT

## 2020-02-26 PROCEDURE — 80053 COMPREHEN METABOLIC PANEL: CPT

## 2020-02-26 PROCEDURE — 99024 POSTOP FOLLOW-UP VISIT: CPT | Performed by: SPECIALIST

## 2020-02-26 PROCEDURE — 1111F DSCHRG MED/CURRENT MED MERGE: CPT | Performed by: SPECIALIST

## 2020-02-26 PROCEDURE — 3008F BODY MASS INDEX DOCD: CPT | Performed by: SPECIALIST

## 2020-02-26 PROCEDURE — 1160F RVW MEDS BY RX/DR IN RCRD: CPT | Performed by: SPECIALIST

## 2020-02-26 PROCEDURE — 36415 COLL VENOUS BLD VENIPUNCTURE: CPT

## 2020-02-26 PROCEDURE — 4040F PNEUMOC VAC/ADMIN/RCVD: CPT | Performed by: SPECIALIST

## 2020-02-26 NOTE — PATIENT INSTRUCTIONS
You will need follow up for the healing midline wound  The Visiting Nurses can do the dressing changes, and I will need to see you back once a week until it is healed  You need good high quality protein, such as peanut butter, tuna fish and supplements, such as Ensure Max  Due to your surgery, you will need to eat small meals frequently  Regarding wound care, please have the nurses follow these orders:    Wash wound daily with soap and water, mild soap, can clean in the shower (using a shower chair) and rinse thoroughly with clean water  Pack loosely with moist saline gauze  Cover with dry sterile gauze    NO HEAVY LIFTING OR STRENUOUS ACTIVITY    Please have blood drawn today, and we will discuss it at your next visit

## 2020-02-26 NOTE — PROGRESS NOTES
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  The patient had presented on January 21st to the [facility], and had an unsuccessful EGD due to gastric outlet obstruction, with a bezoar obstructing her stomach and esophagus  She was ultimately transferred to [facility], and was operated by Dr Yao Contreras on February 3, 2020  The pathology returns as benign  The patient has been unable to follow up at [facility] due to transportation issues  She presents today for wound check, and to remove her skin clips  Visiting nurses have been seeing the patient, and been providing wound care for a dehiscence of the epigastric portion of the incision  At today's visit fibrinous debris was debrided, the wound is granulating, although the fascia is almost assuredly disrupted in the epigastric portion  The remaining skin clips were removed at this time  A Hydrogel gauze dressing was applied, and instructions for visiting nurses to do moist saline wet to dry dressings daily  Instructions for increasing her dietary intake of protein were also discussed  The patient will follow up on a weekly basis as the wound heals            The following portions of the patient's history were reviewed and updated as appropriate: allergies, current medications, past family history, past medical history, past social history, past surgical history and problem list     Review of Systems   Constitutional: Negative for chills and fever  HENT: Trouble swallowing: Able to eat only small meals, and encouraged to take small meals frequently  Respiratory: Negative for shortness of breath  Cardiovascular: Negative for chest pain  Gastrointestinal: Negative for blood in stool, constipation and diarrhea  Abdominal pain:  from her healing midline incision  Genitourinary: Negative for difficulty urinating  Objective:      /72 (BP Location: Left arm, Patient Position: Sitting, Cuff Size: Standard)   Pulse 86   Temp (!) 96 4 °F (35 8 °C) (Tympanic)   Resp 20   Ht 5' (1 524 m)   Wt 66 7 kg (147 lb)   LMP  (LMP Unknown)   BMI 28 71 kg/m²          Physical Exam   Constitutional: She is oriented to person, place, and time  Cachectic white female, frail and elderly   Eyes: No scleral icterus  Cardiovascular: Normal rate  Pulmonary/Chest: Effort normal    Abdominal: Soft  See the media tab for photos of her healing midline incision   Neurological: She is alert and oriented to person, place, and time  Skin: Skin is warm and dry

## 2020-03-04 ENCOUNTER — OFFICE VISIT (OUTPATIENT)
Dept: SURGERY | Facility: CLINIC | Age: 85
End: 2020-03-04
Payer: MEDICARE

## 2020-03-04 VITALS
HEIGHT: 60 IN | RESPIRATION RATE: 20 BRPM | TEMPERATURE: 96.8 F | BODY MASS INDEX: 28.86 KG/M2 | WEIGHT: 147 LBS | SYSTOLIC BLOOD PRESSURE: 110 MMHG | DIASTOLIC BLOOD PRESSURE: 72 MMHG | HEART RATE: 86 BPM

## 2020-03-04 DIAGNOSIS — T81.31XD WOUND DEHISCENCE, SURGICAL, SUBSEQUENT ENCOUNTER: Primary | ICD-10-CM

## 2020-03-04 PROCEDURE — 99212 OFFICE O/P EST SF 10 MIN: CPT | Performed by: SPECIALIST

## 2020-03-04 PROCEDURE — 3008F BODY MASS INDEX DOCD: CPT | Performed by: SPECIALIST

## 2020-03-04 PROCEDURE — 4040F PNEUMOC VAC/ADMIN/RCVD: CPT | Performed by: SPECIALIST

## 2020-03-04 PROCEDURE — 97597 DBRDMT OPN WND 1ST 20 CM/<: CPT | Performed by: SPECIALIST

## 2020-03-04 PROCEDURE — 1111F DSCHRG MED/CURRENT MED MERGE: CPT | Performed by: SPECIALIST

## 2020-03-04 PROCEDURE — 1160F RVW MEDS BY RX/DR IN RCRD: CPT | Performed by: SPECIALIST

## 2020-03-04 RX ORDER — TRAMADOL HYDROCHLORIDE 50 MG/1
TABLET ORAL
COMMUNITY
Start: 2020-02-25 | End: 2020-03-22 | Stop reason: HOSPADM

## 2020-03-04 RX ORDER — HYDROCODONE BITARTRATE AND ACETAMINOPHEN 5; 325 MG/1; MG/1
TABLET ORAL
COMMUNITY
Start: 2020-02-24 | End: 2020-03-22 | Stop reason: HOSPADM

## 2020-03-04 RX ORDER — METHOCARBAMOL 750 MG/1
TABLET, FILM COATED ORAL
COMMUNITY
Start: 2020-01-13 | End: 2020-05-26

## 2020-03-04 RX ORDER — DEXTRAN 70, GLYCERIN, HYPROMELLOSE 1; 2; 3 MG/ML; MG/ML; MG/ML
1 SOLUTION/ DROPS OPHTHALMIC 3 TIMES DAILY
COMMUNITY
Start: 2020-02-14 | End: 2022-01-01 | Stop reason: HOSPADM

## 2020-03-04 NOTE — ASSESSMENT & PLAN NOTE
Follow up from initial visit 1 week earlier for wound care  Patient is s/p subtotal gastrectomy at [facility] for complicated gastric ulcer  With a BII reconstruction  Eating OK, albumin noted to be 2 7 on last week's labs, will recheck in one month  VNA has been doing dressing changes with moist saline gauze  Wound appears to be daniel, but slough remains at base, and further wound debridement is necessary  Procedure:    Selective debridement of midline abdominal wound:  Wound is insensate, cleaned with hibiclens  Sharp debridement of fibrinous exudate with No 15 Scalpel blade  Irrigation with Normal Saline  Dressed with hydrogel gauze  Patient tolerated well  Present measurements: 3 8 cm length x 2 0 cm width x 1 8 cm depth with 3 mm of undermining     See picture under media tab    Results for Skye Wise (MRN [id number]) as of 3/4/2020 13:55   Ref   Range 2/26/2020 11:29   Sodium Latest Ref Range: 136 - 145 mmol/L 138   Potassium Latest Ref Range: 3 5 - 5 3 mmol/L 3 7   Chloride Latest Ref Range: 100 - 108 mmol/L 106   CO2 Latest Ref Range: 21 - 32 mmol/L 26   Anion Gap Latest Ref Range: 4 - 13 mmol/L 6   BUN Latest Ref Range: 5 - 25 mg/dL 7   Creatinine Latest Ref Range: 0 60 - 1 30 mg/dL 0 42 (L)   Glucose, Random Latest Ref Range: 65 - 140 mg/dL 97   Calcium Latest Ref Range: 8 3 - 10 1 mg/dL 8 5   AST Latest Ref Range: 5 - 45 U/L 16   ALT Latest Ref Range: 12 - 78 U/L 25   Alkaline Phosphatase Latest Ref Range: 46 - 116 U/L 191 (H)   Total Protein Latest Ref Range: 6 4 - 8 2 g/dL 7 0   Albumin Latest Ref Range: 3 5 - 5 0 g/dL 2 7 (L)   TOTAL BILIRUBIN Latest Ref Range: 0 20 - 1 00 mg/dL 0 27   eGFR Latest Units: ml/min/1 73sq m 96   WBC Latest Ref Range: 4 31 - 10 16 Thousand/uL 6 78   Red Blood Cell Count Latest Ref Range: 3 81 - 5 12 Million/uL 4 15   Hemoglobin Latest Ref Range: 11 5 - 15 4 g/dL 10 4 (L)   HCT Latest Ref Range: 34 8 - 46 1 % 35 9   MCV Latest Ref Range: 82 - 98 fL 87   MCH Latest Ref Range: 26 8 - 34 3 pg 25 1 (L)   MCHC Latest Ref Range: 31 4 - 37 4 g/dL 29 0 (L)   RDW Latest Ref Range: 11 6 - 15 1 % 16 8 (H)   Platelet Count Latest Ref Range: 149 - 390 Thousands/uL 346   MPV Latest Ref Range: 8 9 - 12 7 fL 9 4   nRBC Latest Units: /100 WBCs 0   Neutrophils % Latest Ref Range: 43 - 75 % 62   Immat GRANS % Latest Ref Range: 0 - 2 % 1   Lymphocytes Relative Latest Ref Range: 14 - 44 % 23   Monocytes Relative Latest Ref Range: 4 - 12 % 12   Eosinophils Latest Ref Range: 0 - 6 % 2   Basophils Relative Latest Ref Range: 0 - 1 % 0   Immature Grans Absolute Latest Ref Range: 0 00 - 0 20 Thousand/uL 0 08   Absolute Neutrophils Latest Ref Range: 1 85 - 7 62 Thousands/µL 4 22   Lymphocytes Absolute Latest Ref Range: 0 60 - 4 47 Thousands/µL 1 55   Absolute Monocytes Latest Ref Range: 0 17 - 1 22 Thousand/µL 0 80   Absolute Eosinophils Latest Ref Range: 0 00 - 0 61 Thousand/µL 0 11   Basophils Absolute Latest Ref Range: 0 00 - 0 10 Thousands/µL 0 02

## 2020-03-04 NOTE — PROGRESS NOTES
Assessment/Plan:    Wound dehiscence, surgical, subsequent encounter  Follow up from initial visit 1 week earlier for wound care  Patient is s/p subtotal gastrectomy at [facility] for complicated gastric ulcer  With a BII reconstruction  Eating OK, albumin noted to be 2 7 on last week's labs, will recheck in one month  [facility] has been doing dressing changes with moist saline gauze  Wound appears to be daniel, but slough remains at base, and further wound debridement is necessary  Procedure:    Selective debridement of midline abdominal wound:  Wound is insensate, cleaned with hibiclens  Sharp debridement of fibrinous exudate with No 15 Scalpel blade  Irrigation with Normal Saline  Dressed with hydrogel gauze  Patient tolerated well  Present measurements: 3 8 cm length x 2 0 cm width x 1 8 cm depth with 3 mm of undermining     See picture under media tab    Results for Sourav Ventura (MRN [id number]) as of 3/4/2020 13:55   Ref   Range 2/26/2020 11:29   Sodium Latest Ref Range: 136 - 145 mmol/L 138   Potassium Latest Ref Range: 3 5 - 5 3 mmol/L 3 7   Chloride Latest Ref Range: 100 - 108 mmol/L 106   CO2 Latest Ref Range: 21 - 32 mmol/L 26   Anion Gap Latest Ref Range: 4 - 13 mmol/L 6   BUN Latest Ref Range: 5 - 25 mg/dL 7   Creatinine Latest Ref Range: 0 60 - 1 30 mg/dL 0 42 (L)   Glucose, Random Latest Ref Range: 65 - 140 mg/dL 97   Calcium Latest Ref Range: 8 3 - 10 1 mg/dL 8 5   AST Latest Ref Range: 5 - 45 U/L 16   ALT Latest Ref Range: 12 - 78 U/L 25   Alkaline Phosphatase Latest Ref Range: 46 - 116 U/L 191 (H)   Total Protein Latest Ref Range: 6 4 - 8 2 g/dL 7 0   Albumin Latest Ref Range: 3 5 - 5 0 g/dL 2 7 (L)   TOTAL BILIRUBIN Latest Ref Range: 0 20 - 1 00 mg/dL 0 27   eGFR Latest Units: ml/min/1 73sq m 96   WBC Latest Ref Range: 4 31 - 10 16 Thousand/uL 6 78   Red Blood Cell Count Latest Ref Range: 3 81 - 5 12 Million/uL 4 15   Hemoglobin Latest Ref Range: 11 5 - 15 4 g/dL 10 4 (L)   HCT Latest Ref Range: 34 8 - 46 1 % 35 9   MCV Latest Ref Range: 82 - 98 fL 87   MCH Latest Ref Range: 26 8 - 34 3 pg 25 1 (L)   MCHC Latest Ref Range: 31 4 - 37 4 g/dL 29 0 (L)   RDW Latest Ref Range: 11 6 - 15 1 % 16 8 (H)   Platelet Count Latest Ref Range: 149 - 390 Thousands/uL 346   MPV Latest Ref Range: 8 9 - 12 7 fL 9 4   nRBC Latest Units: /100 WBCs 0   Neutrophils % Latest Ref Range: 43 - 75 % 62   Immat GRANS % Latest Ref Range: 0 - 2 % 1   Lymphocytes Relative Latest Ref Range: 14 - 44 % 23   Monocytes Relative Latest Ref Range: 4 - 12 % 12   Eosinophils Latest Ref Range: 0 - 6 % 2   Basophils Relative Latest Ref Range: 0 - 1 % 0   Immature Grans Absolute Latest Ref Range: 0 00 - 0 20 Thousand/uL 0 08   Absolute Neutrophils Latest Ref Range: 1 85 - 7 62 Thousands/µL 4 22   Lymphocytes Absolute Latest Ref Range: 0 60 - 4 47 Thousands/µL 1 55   Absolute Monocytes Latest Ref Range: 0 17 - 1 22 Thousand/µL 0 80   Absolute Eosinophils Latest Ref Range: 0 00 - 0 61 Thousand/µL 0 11   Basophils Absolute Latest Ref Range: 0 00 - 0 10 Thousands/µL 0 02        Diagnoses and all orders for this visit:    Wound dehiscence, surgical, subsequent encounter    Other orders  -     methocarbamol (ROBAXIN) 750 mg tablet  -     traMADol (ULTRAM) 50 mg tablet  -     HYDROcodone-acetaminophen (NORCO) 5-325 mg per tablet  -     Artificial Tear Solution (GENTEAL TEARS) 0 1-0 2-0 3 % SOLN; Administer 1 drop to both eyes 3 (three) times a day          Subjective:      Patient ID: Gail Max is a 82 y o  female      HPI    The following portions of the patient's history were reviewed and updated as appropriate: allergies, current medications, past family history, past medical history, past social history, past surgical history and problem list     Review of Systems      Objective:      /72 (BP Location: Left arm, Patient Position: Sitting, Cuff Size: Standard)   Pulse 86   Temp (!) 96 8 °F (36 °C) (Tympanic)   Resp 20   Ht 5' (1 524 m)   Wt 66 7 kg (147 lb)   LMP  (LMP Unknown)   BMI 28 71 kg/m²          Physical Exam

## 2020-03-06 ENCOUNTER — PATIENT OUTREACH (OUTPATIENT)
Dept: INTERNAL MEDICINE CLINIC | Facility: CLINIC | Age: 85
End: 2020-03-06

## 2020-03-06 DIAGNOSIS — Z71.89 COMPLEX CARE COORDINATION: Primary | ICD-10-CM

## 2020-03-06 NOTE — PROGRESS NOTES
Outpatient Care Management Note:  RONALDO referral received  Chart reviewed  Jaja Camejo is active with [facility] for SN, PT and OT  Those noted reviewed as well    Does hav an appointment with a physician for her back pain on 3/11/2020

## 2020-03-09 ENCOUNTER — PATIENT OUTREACH (OUTPATIENT)
Dept: INTERNAL MEDICINE CLINIC | Facility: CLINIC | Age: 85
End: 2020-03-09

## 2020-03-09 NOTE — PROGRESS NOTES
Outpatient Care Management Note:  Outreach attempted  Message left for patient to please return call  Contact information left on message  Home health notes reviewed  Tiger text sent to home health to make them aware that I had received a referral on the patient and they could contact me directly with issues

## 2020-03-11 ENCOUNTER — OFFICE VISIT (OUTPATIENT)
Dept: SURGERY | Facility: CLINIC | Age: 85
End: 2020-03-11
Payer: MEDICARE

## 2020-03-11 VITALS
BODY MASS INDEX: 28.86 KG/M2 | WEIGHT: 147 LBS | SYSTOLIC BLOOD PRESSURE: 98 MMHG | RESPIRATION RATE: 18 BRPM | HEART RATE: 93 BPM | TEMPERATURE: 97.1 F | DIASTOLIC BLOOD PRESSURE: 74 MMHG | HEIGHT: 60 IN

## 2020-03-11 DIAGNOSIS — T81.31XD WOUND DEHISCENCE, SURGICAL, SUBSEQUENT ENCOUNTER: Primary | ICD-10-CM

## 2020-03-11 PROCEDURE — 99212 OFFICE O/P EST SF 10 MIN: CPT | Performed by: SPECIALIST

## 2020-03-11 PROCEDURE — 3008F BODY MASS INDEX DOCD: CPT | Performed by: SPECIALIST

## 2020-03-11 PROCEDURE — 1160F RVW MEDS BY RX/DR IN RCRD: CPT | Performed by: SPECIALIST

## 2020-03-11 PROCEDURE — 4040F PNEUMOC VAC/ADMIN/RCVD: CPT | Performed by: SPECIALIST

## 2020-03-11 PROCEDURE — 97597 DBRDMT OPN WND 1ST 20 CM/<: CPT | Performed by: SPECIALIST

## 2020-03-11 PROCEDURE — 1111F DSCHRG MED/CURRENT MED MERGE: CPT | Performed by: SPECIALIST

## 2020-03-11 NOTE — ASSESSMENT & PLAN NOTE
5 & 1/2 weeks out from antrectomy with BII reconstruction for perforated gastric ulcer  Midline wound is healing, appears that the wound has made significant progress since last week  Measures 2 6 cm x 0 8cm x 1 4 cm depth  Nearly confluent granulation tissue at the base, still some fibrinous exudate requiring selective debridement  Patient feeling stronger, with a good appetite  Continue moist saline gauze as supervised by NIALL

## 2020-03-11 NOTE — PROGRESS NOTES
Assessment/Plan:    Wound dehiscence, surgical, subsequent encounter  5 & 1/2 weeks out from antrectomy with BII reconstruction for perforated gastric ulcer  Midline wound is healing, appears that the wound has made significant progress since last week  Measures 2 6 cm x 0 8cm x 1 4 cm depth  Nearly confluent granulation tissue at the base, still some fibrinous exudate requiring selective debridement  Patient feeling stronger, with a good appetite  Continue moist saline gauze as supervised by [facility]  Diagnoses and all orders for this visit:    Wound dehiscence, surgical, subsequent encounter          Subjective:      Patient ID: Ivet Chang is a 82 y o  female  HPI    The following portions of the patient's history were reviewed and updated as appropriate: allergies, current medications, past family history, past medical history, past social history, past surgical history and problem list     Review of Systems      Objective:      BP 98/74 (BP Location: Left arm, Patient Position: Sitting, Cuff Size: Standard)   Pulse 93   Temp (!) 97 1 °F (36 2 °C) (Tympanic)   Resp 18   Ht 5' (1 524 m)   Wt 66 7 kg (147 lb)   LMP  (LMP Unknown)   BMI 28 71 kg/m²          Physical Exam      Procedure:  Selective Debridement of Midline wound:  Wound is insensate  Cleaned with Hibiclens  Sharp debridement with No 15 scalpel of fibrinous exudate  Irrigated with NS  Dressed with wound gel and gauze, to be changed with moist saline gauze in 24 hrs by [facility]

## 2020-03-11 NOTE — PATIENT INSTRUCTIONS
Leave dressing in place for next 24 hrs  Wash wound with soap and water, redress with moist saline gauze daily  Follow up in one week

## 2020-03-13 ENCOUNTER — OFFICE VISIT (OUTPATIENT)
Dept: URGENT CARE | Facility: CLINIC | Age: 85
End: 2020-03-13
Payer: MEDICARE

## 2020-03-13 VITALS
SYSTOLIC BLOOD PRESSURE: 113 MMHG | TEMPERATURE: 97.9 F | DIASTOLIC BLOOD PRESSURE: 63 MMHG | RESPIRATION RATE: 30 BRPM | WEIGHT: 147 LBS | OXYGEN SATURATION: 97 % | HEIGHT: 60 IN | BODY MASS INDEX: 28.86 KG/M2 | HEART RATE: 86 BPM

## 2020-03-13 DIAGNOSIS — R06.02 SHORTNESS OF BREATH: Primary | ICD-10-CM

## 2020-03-13 PROCEDURE — G0463 HOSPITAL OUTPT CLINIC VISIT: HCPCS | Performed by: PHYSICIAN ASSISTANT

## 2020-03-13 PROCEDURE — 96372 THER/PROPH/DIAG INJ SC/IM: CPT | Performed by: PHYSICIAN ASSISTANT

## 2020-03-13 PROCEDURE — 99213 OFFICE O/P EST LOW 20 MIN: CPT | Performed by: PHYSICIAN ASSISTANT

## 2020-03-13 RX ORDER — DIPHENHYDRAMINE HYDROCHLORIDE 50 MG/ML
50 INJECTION INTRAMUSCULAR; INTRAVENOUS ONCE
Status: COMPLETED | OUTPATIENT
Start: 2020-03-13 | End: 2020-03-13

## 2020-03-13 RX ADMIN — DIPHENHYDRAMINE HYDROCHLORIDE 50 MG: 50 INJECTION INTRAMUSCULAR; INTRAVENOUS at 10:16

## 2020-03-13 NOTE — PROGRESS NOTES
[address] [facility]        NAME: Sravani Nava is a 82 y o  female  : 1935    MRN: [id number]  DATE: 2020  TIME: 10:41 AM    Assessment and Plan   Shortness of breath [R06 02]  1  Shortness of breath  Transfer to other facility    diphenhydrAMINE (BENADRYL) injection 50 mg         Patient Instructions     Patient Instructions   Transfer to ED  EMS called  EMS arrived  Patient refusing transport to ED  Patient is advised dot going to the emergency room could result worsening symptoms and possibly death  Patient states I am 84 rather just go home and die  Patient states she will call an ambulance of her symptoms get worse  AMA form signed  Chief Complaint     Chief Complaint   Patient presents with    Allergic Reaction     pt states she was stung on the right hand, is having shortness of breath and swelling at site  History of Present Illness       70-year-old female presents to clinic with difficulty breathing and swollen right hand x1 day  Patient states she she may have gotten the past better something  She reports allergies to bee stings  She she states she is not on home oxygen but did not bring it with her today  She reports difficulty breathing feeling like her throat is closing and tingling in her hands  The right hand is red and swollen  No break in skin  Patient reports recently starting oxycodone/acetaminophen for back pain  Review of Systems   Review of Systems   Constitutional: Negative for chills, fatigue and fever  HENT: Positive for trouble swallowing  Negative for congestion, ear pain, postnasal drip, rhinorrhea, sinus pressure, sore throat and voice change  Eyes: Negative for discharge and redness  Respiratory: Positive for cough, chest tightness and shortness of breath  Negative for wheezing  Cardiovascular: Negative for chest pain  Gastrointestinal: Negative for diarrhea, nausea and vomiting     Musculoskeletal: Negative for myalgias  Skin: Positive for wound (abdominal wound)  Neurological: Negative for dizziness and headaches  Hematological: Negative for adenopathy           Current Medications       Current Outpatient Medications:     acetaminophen (TYLENOL) 325 mg tablet, Take 2 tablets (650 mg total) by mouth every 6 (six) hours as needed for mild pain, headaches or fever, Disp: 30 tablet, Rfl: 0    al mag oxide-diphenhydramine-lidocaine viscous (MAGIC MOUTHWASH) 1:1:1 suspension, Swish and swallow 10 mL every 4 (four) hours as needed for mouth pain or discomfort, Disp: 1 Bottle, Rfl: 0    albuterol (PROVENTIL HFA,VENTOLIN HFA) 90 mcg/act inhaler, Inhale 2 puffs every 6 (six) hours as needed for wheezing or shortness of breath, Disp: 1 Inhaler, Rfl: 1    alendronate (FOSAMAX) 70 mg tablet, Take by mouth every 7 days , Disp: , Rfl: 0    ALPRAZolam (XANAX) 0 5 mg tablet, Take 0 5 mg by mouth daily at bedtime as needed, Disp: , Rfl:     apixaban (ELIQUIS) 5 mg, Take 1 tablet (5 mg total) by mouth 2 (two) times a day, Disp: 60 tablet, Rfl: 0    Artificial Tear Solution (GENTEAL TEARS) 0 1-0 2-0 3 % SOLN, Administer 1 drop to both eyes 3 (three) times a day, Disp: , Rfl:     Calcium Carb-Cholecalciferol (CALCIUM 1000 + D PO), Take 1,000 mg by mouth daily, Disp: , Rfl:     cholestyramine sugar free (QUESTRAN LIGHT) 4 g packet, Take 1 packet (4 g total) by mouth 2 (two) times a day, Disp: 60 tablet, Rfl: 1    dextran 70-hypromellose (GENTEAL TEARS) 0 1-0 3 % ophthalmic solution, Administer 1 drop to both eyes 3 (three) times a day, Disp: 1 Bottle, Rfl: 0    diltiazem (CARDIZEM CD) 240 mg 24 hr capsule, Take 1 capsule (240 mg total) by mouth daily, Disp: 90 capsule, Rfl: 0    fluticasone-umeclidinium-vilanterol (TRELEGY ELLIPTA) 100-62 5-25 MCG/INH inhaler, 1 puff daily, Disp: , Rfl:     HYDROcodone-acetaminophen (NORCO) 5-325 mg per tablet, , Disp: , Rfl:     ipratropium-albuterol (DUO-NEB) 0 5-2 5 mg/3 mL nebulizer solution, Take 1 vial (3 mL total) by nebulization every 6 (six) hours while awake, Disp: 300 mL, Rfl: 0    iron polysaccharides (FERREX) 150 mg capsule, Take 1 capsule (150 mg total) by mouth 2 (two) times a day, Disp: 60 capsule, Rfl: 1    loperamide (IMODIUM) 2 mg capsule, Take 1 capsule (2 mg total) by mouth 3 (three) times a day as needed for diarrhea, Disp: 30 capsule, Rfl: 1    methocarbamol (ROBAXIN) 750 mg tablet, , Disp: , Rfl:     metoprolol tartrate (LOPRESSOR) 25 mg tablet, Take 0 5 tablets (12 5 mg total) by mouth every 12 (twelve) hours, Disp: 90 tablet, Rfl: 0    montelukast (SINGULAIR) 10 mg tablet, Take 1 tablet (10 mg total) by mouth daily at bedtime, Disp: 30 tablet, Rfl: 1    pantoprazole (PROTONIX) 40 mg tablet, Take 1 tablet (40 mg total) by mouth daily in the early morning, Disp: 30 tablet, Rfl: 1    potassium chloride (MICRO-K) 10 MEQ CR capsule, Take 1 capsule (10 mEq total) by mouth daily, Disp: 30 capsule, Rfl: 1    psyllium (METAMUCIL) packet, Take 1 packet by mouth 3 (three) times a day, Disp: 100 packet, Rfl: 1    simethicone (MYLICON) 80 mg chewable tablet, Chew 1 tablet (80 mg total) every 6 (six) hours as needed for flatulence, Disp: 30 tablet, Rfl: 0    traMADol (ULTRAM) 50 mg tablet, , Disp: , Rfl:   No current facility-administered medications for this visit       Current Allergies     Allergies as of 03/13/2020 - Reviewed 03/13/2020   Allergen Reaction Noted    Bee venom  03/13/2020    Fluticasone              The following portions of the patient's history were reviewed and updated as appropriate: allergies, current medications, past family history, past medical history, past social history, past surgical history and problem list      Past Medical History:   Diagnosis Date    Asthma     Atrial fibrillation     Blurred vision     Cardiac disease     Colitis     COPD (chronic obstructive pulmonary disease)     Diverticulosis     DJD (degenerative joint disease)  Emphysema lung     Gait abnormality        Past Surgical History:   Procedure Laterality Date    BLADDER SURGERY      COLON SURGERY      COLONOSCOPY W/ POLYPECTOMY N/A 1/21/2019    Procedure: COLONOSCOPY W/ POLYPECTOMY;  Surgeon: Mitch Canavan, MD;  Location: [facility]; Service: General    GASTROJEJUNOSTOMY W/ JEJUNOSTOMY TUBE N/A 2/3/2020    Procedure: Antrectomy with bilroth II Anastomosis; Surgeon: Yenni Long MD;  Location:  MAIN OR;  Service: General    SMALL INTESTINE SURGERY         Family History   Problem Relation Age of Onset    Heart disease Mother          Medications have been verified  Objective   /63   Pulse 86   Temp 97 9 °F (36 6 °C) (Tympanic)   Resp (!) 30   Ht 5' (1 524 m)   Wt 66 7 kg (147 lb)   LMP  (LMP Unknown)   SpO2 97%   BMI 28 71 kg/m²        Physical Exam     Physical Exam   Constitutional: She appears well-developed and well-nourished  She appears distressed  HENT:   Head: Normocephalic and atraumatic  Neck: No tracheal deviation present  Cardiovascular: Normal rate and regular rhythm  Pulmonary/Chest: Breath sounds normal  Tachypnea noted  She is in respiratory distress  Skin: No rash noted  Vitals reviewed

## 2020-03-13 NOTE — PATIENT INSTRUCTIONS
Transfer to ED  EMS called  EMS arrived  Patient refusing transport to ED  Patient is advised dot going to the emergency room could result worsening symptoms and possibly death  Patient states I am 84 rather just go home and die  Patient states she will call an ambulance of her symptoms get worse  AMA form signed

## 2020-03-14 ENCOUNTER — HOSPITAL ENCOUNTER (EMERGENCY)
Facility: HOSPITAL | Age: 85
Discharge: HOME | DRG: 382 | End: 2020-03-15
Attending: EMERGENCY MEDICINE | Admitting: EMERGENCY MEDICINE
Payer: MEDICARE

## 2020-03-14 ENCOUNTER — APPOINTMENT (EMERGENCY)
Dept: CT IMAGING | Facility: HOSPITAL | Age: 85
DRG: 382 | End: 2020-03-14
Payer: MEDICARE

## 2020-03-14 ENCOUNTER — APPOINTMENT (EMERGENCY)
Dept: RADIOLOGY | Facility: HOSPITAL | Age: 85
DRG: 382 | End: 2020-03-14
Payer: MEDICARE

## 2020-03-14 VITALS
RESPIRATION RATE: 24 BRPM | BODY MASS INDEX: 28.86 KG/M2 | TEMPERATURE: 98 F | OXYGEN SATURATION: 95 % | WEIGHT: 147 LBS | SYSTOLIC BLOOD PRESSURE: 121 MMHG | DIASTOLIC BLOOD PRESSURE: 78 MMHG | HEIGHT: 60 IN | HEART RATE: 102 BPM

## 2020-03-14 DIAGNOSIS — J44.1 COPD EXACERBATION (HCC): Primary | ICD-10-CM

## 2020-03-14 LAB
ALBUMIN SERPL BCP-MCNC: 2.8 G/DL (ref 3.5–5.7)
ALP SERPL-CCNC: 211 U/L (ref 55–165)
ALT SERPL W P-5'-P-CCNC: 35 U/L (ref 7–52)
ANION GAP SERPL CALCULATED.3IONS-SCNC: 5 MMOL/L (ref 4–13)
AST SERPL W P-5'-P-CCNC: 54 U/L (ref 13–39)
BASOPHILS # BLD AUTO: 0 THOUSANDS/ΜL (ref 0–0.1)
BASOPHILS NFR BLD AUTO: 0 % (ref 0–2)
BILIRUB SERPL-MCNC: 0.4 MG/DL (ref 0.2–1)
BNP SERPL-MCNC: 93 PG/ML (ref 1–100)
BUN SERPL-MCNC: 8 MG/DL (ref 7–25)
CALCIUM SERPL-MCNC: 8.1 MG/DL (ref 8.6–10.5)
CHLORIDE SERPL-SCNC: 98 MMOL/L (ref 98–107)
CO2 SERPL-SCNC: 26 MMOL/L (ref 21–31)
CREAT SERPL-MCNC: 0.37 MG/DL (ref 0.6–1.2)
D DIMER PPP FEU-MCNC: 0.38 UG/ML FEU
EOSINOPHIL # BLD AUTO: 0.1 THOUSAND/ΜL (ref 0–0.61)
EOSINOPHIL NFR BLD AUTO: 1 % (ref 0–5)
ERYTHROCYTE [DISTWIDTH] IN BLOOD BY AUTOMATED COUNT: 17 % (ref 11.5–14.5)
FLUAV RNA NPH QL NAA+PROBE: NORMAL
FLUBV RNA NPH QL NAA+PROBE: NORMAL
GFR SERPL CREATININE-BSD FRML MDRD: 100 ML/MIN/1.73SQ M
GLUCOSE SERPL-MCNC: 129 MG/DL (ref 65–99)
HCT VFR BLD AUTO: 34.8 % (ref 42–47)
HGB BLD-MCNC: 10.9 G/DL (ref 12–16)
LYMPHOCYTES # BLD AUTO: 1.3 THOUSANDS/ΜL (ref 0.6–4.47)
LYMPHOCYTES NFR BLD AUTO: 15 % (ref 21–51)
MCH RBC QN AUTO: 25.8 PG (ref 26–34)
MCHC RBC AUTO-ENTMCNC: 31.4 G/DL (ref 31–37)
MCV RBC AUTO: 82 FL (ref 81–99)
MONOCYTES # BLD AUTO: 0.8 THOUSAND/ΜL (ref 0.17–1.22)
MONOCYTES NFR BLD AUTO: 9 % (ref 2–12)
NEUTROPHILS # BLD AUTO: 6.6 THOUSANDS/ΜL (ref 1.4–6.5)
NEUTS SEG NFR BLD AUTO: 76 % (ref 42–75)
PLATELET # BLD AUTO: 294 THOUSANDS/UL (ref 149–390)
PMV BLD AUTO: 7.2 FL (ref 8.6–11.7)
POTASSIUM SERPL-SCNC: 3.7 MMOL/L (ref 3.5–5.5)
PROT SERPL-MCNC: 5.5 G/DL (ref 6.4–8.9)
RBC # BLD AUTO: 4.22 MILLION/UL (ref 3.9–5.2)
RSV RNA NPH QL NAA+PROBE: NORMAL
SODIUM SERPL-SCNC: 129 MMOL/L (ref 134–143)
WBC # BLD AUTO: 8.7 THOUSAND/UL (ref 4.8–10.8)

## 2020-03-14 PROCEDURE — 36415 COLL VENOUS BLD VENIPUNCTURE: CPT | Performed by: EMERGENCY MEDICINE

## 2020-03-14 PROCEDURE — 99285 EMERGENCY DEPT VISIT HI MDM: CPT | Performed by: EMERGENCY MEDICINE

## 2020-03-14 PROCEDURE — 83880 ASSAY OF NATRIURETIC PEPTIDE: CPT | Performed by: EMERGENCY MEDICINE

## 2020-03-14 PROCEDURE — 85025 COMPLETE CBC W/AUTO DIFF WBC: CPT | Performed by: EMERGENCY MEDICINE

## 2020-03-14 PROCEDURE — 93005 ELECTROCARDIOGRAM TRACING: CPT

## 2020-03-14 PROCEDURE — 96374 THER/PROPH/DIAG INJ IV PUSH: CPT

## 2020-03-14 PROCEDURE — 74177 CT ABD & PELVIS W/CONTRAST: CPT

## 2020-03-14 PROCEDURE — 96361 HYDRATE IV INFUSION ADD-ON: CPT

## 2020-03-14 PROCEDURE — 71275 CT ANGIOGRAPHY CHEST: CPT

## 2020-03-14 PROCEDURE — 87631 RESP VIRUS 3-5 TARGETS: CPT | Performed by: EMERGENCY MEDICINE

## 2020-03-14 PROCEDURE — 99285 EMERGENCY DEPT VISIT HI MDM: CPT

## 2020-03-14 PROCEDURE — 80053 COMPREHEN METABOLIC PANEL: CPT | Performed by: EMERGENCY MEDICINE

## 2020-03-14 PROCEDURE — 94640 AIRWAY INHALATION TREATMENT: CPT | Performed by: EMERGENCY MEDICINE

## 2020-03-14 PROCEDURE — 94640 AIRWAY INHALATION TREATMENT: CPT

## 2020-03-14 PROCEDURE — 85379 FIBRIN DEGRADATION QUANT: CPT | Performed by: EMERGENCY MEDICINE

## 2020-03-14 PROCEDURE — 96375 TX/PRO/DX INJ NEW DRUG ADDON: CPT

## 2020-03-14 PROCEDURE — 94150 VITAL CAPACITY TEST: CPT

## 2020-03-14 PROCEDURE — 71045 X-RAY EXAM CHEST 1 VIEW: CPT

## 2020-03-14 RX ORDER — ONDANSETRON 2 MG/ML
4 INJECTION INTRAMUSCULAR; INTRAVENOUS ONCE
Status: COMPLETED | OUTPATIENT
Start: 2020-03-14 | End: 2020-03-14

## 2020-03-14 RX ORDER — DEXAMETHASONE SODIUM PHOSPHATE 10 MG/ML
10 INJECTION, SOLUTION INTRAMUSCULAR; INTRAVENOUS ONCE
Status: COMPLETED | OUTPATIENT
Start: 2020-03-14 | End: 2020-03-14

## 2020-03-14 RX ADMIN — IOHEXOL 100 ML: 350 INJECTION, SOLUTION INTRAVENOUS at 22:11

## 2020-03-14 RX ADMIN — SODIUM CHLORIDE 1000 ML: 0.9 INJECTION, SOLUTION INTRAVENOUS at 18:56

## 2020-03-14 RX ADMIN — IPRATROPIUM BROMIDE 0.5 MG: 0.5 SOLUTION RESPIRATORY (INHALATION) at 19:01

## 2020-03-14 RX ADMIN — DEXAMETHASONE SODIUM PHOSPHATE 10 MG: 10 INJECTION, SOLUTION INTRAMUSCULAR; INTRAVENOUS at 18:57

## 2020-03-14 RX ADMIN — ALBUTEROL SULFATE 5 MG: 2.5 SOLUTION RESPIRATORY (INHALATION) at 19:01

## 2020-03-14 RX ADMIN — ONDANSETRON 4 MG: 2 INJECTION INTRAMUSCULAR; INTRAVENOUS at 19:27

## 2020-03-14 NOTE — ED PROVIDER NOTES
History  Chief Complaint   Patient presents with    Shortness of Breath     short of breath    Diarrhea    Arm Swelling     Patient presents with 1 day of shortness of breath and wheeze  She also has an associated dry cough  In addition to this she has had a recent enterectomy and reports 2 days of loose stools nonbloody no melena  Finally, she believes she was bitten by an insect and has an area of redness to the dorsum of her right hand  She was treated with Benadryl and that redness is decreasing and extend  No fevers no chills no chest pain  Some nausea no vomiting  No dysuria hematuria frequency  Prior to Admission Medications   Prescriptions Last Dose Informant Patient Reported? Taking?    ALPRAZolam (XANAX) 0 5 mg tablet  Self Yes No   Sig: Take 0 5 mg by mouth daily at bedtime as needed   Artificial Tear Solution (GENTEAL TEARS) 0 1-0 2-0 3 % SOLN  Self Yes No   Sig: Administer 1 drop to both eyes 3 (three) times a day   Calcium Carb-Cholecalciferol (CALCIUM 1000 + D PO)  Self Yes No   Sig: Take 1,000 mg by mouth daily   HYDROcodone-acetaminophen (NORCO) 5-325 mg per tablet  Self Yes No   acetaminophen (TYLENOL) 325 mg tablet  Self No No   Sig: Take 2 tablets (650 mg total) by mouth every 6 (six) hours as needed for mild pain, headaches or fever   al mag oxide-diphenhydramine-lidocaine viscous (MAGIC MOUTHWASH) 1:1:1 suspension  Self No No   Sig: Swish and swallow 10 mL every 4 (four) hours as needed for mouth pain or discomfort   albuterol (PROVENTIL HFA,VENTOLIN HFA) 90 mcg/act inhaler  Self No No   Sig: Inhale 2 puffs every 6 (six) hours as needed for wheezing or shortness of breath   alendronate (FOSAMAX) 70 mg tablet  Self Yes No   Sig: Take by mouth every 7 days    apixaban (ELIQUIS) 5 mg  Self No No   Sig: Take 1 tablet (5 mg total) by mouth 2 (two) times a day   cholestyramine sugar free (QUESTRAN LIGHT) 4 g packet  Self No No   Sig: Take 1 packet (4 g total) by mouth 2 (two) times a day   dextran 70-hypromellose (GENTEAL TEARS) 0 1-0 3 % ophthalmic solution  Self No No   Sig: Administer 1 drop to both eyes 3 (three) times a day   diltiazem (CARDIZEM CD) 240 mg 24 hr capsule  Self No No   Sig: Take 1 capsule (240 mg total) by mouth daily   fluticasone-umeclidinium-vilanterol (TRELEGY ELLIPTA) 100-62 5-25 MCG/INH inhaler  Self Yes No   Si puff daily   ipratropium-albuterol (DUO-NEB) 0 5-2 5 mg/3 mL nebulizer solution  Self No No   Sig: Take 1 vial (3 mL total) by nebulization every 6 (six) hours while awake   iron polysaccharides (FERREX) 150 mg capsule  Self No No   Sig: Take 1 capsule (150 mg total) by mouth 2 (two) times a day   loperamide (IMODIUM) 2 mg capsule  Self No No   Sig: Take 1 capsule (2 mg total) by mouth 3 (three) times a day as needed for diarrhea   methocarbamol (ROBAXIN) 750 mg tablet  Self Yes No   metoprolol tartrate (LOPRESSOR) 25 mg tablet  Self No No   Sig: Take 0 5 tablets (12 5 mg total) by mouth every 12 (twelve) hours   montelukast (SINGULAIR) 10 mg tablet  Self No No   Sig: Take 1 tablet (10 mg total) by mouth daily at bedtime   pantoprazole (PROTONIX) 40 mg tablet  Self No No   Sig: Take 1 tablet (40 mg total) by mouth daily in the early morning   potassium chloride (MICRO-K) 10 MEQ CR capsule  Self No No   Sig: Take 1 capsule (10 mEq total) by mouth daily   psyllium (METAMUCIL) packet  Self No No   Sig: Take 1 packet by mouth 3 (three) times a day   simethicone (MYLICON) 80 mg chewable tablet  Self No No   Sig: Chew 1 tablet (80 mg total) every 6 (six) hours as needed for flatulence   traMADol (ULTRAM) 50 mg tablet  Self Yes No      Facility-Administered Medications: None       Past Medical History:   Diagnosis Date    Asthma     Atrial fibrillation     Blurred vision     Cardiac disease     Colitis     COPD (chronic obstructive pulmonary disease)     Diverticulosis     DJD (degenerative joint disease)     Emphysema lung     Gait abnormality Past Surgical History:   Procedure Laterality Date    BLADDER SURGERY      COLON SURGERY      COLONOSCOPY W/ POLYPECTOMY N/A 2019    Procedure: COLONOSCOPY W/ POLYPECTOMY;  Surgeon: Vin Ron MD;  Location: [address] [place] [address] GI LAB; Service: General    GASTROJEJUNOSTOMY W/ JEJUNOSTOMY TUBE N/A 2/3/2020    Procedure: Antrectomy with bilroth II Anastomosis; Surgeon: Parker Flores MD;  [facility]: [facility];  Service: General    SMALL INTESTINE SURGERY         Family History   Problem Relation Age of Onset    Heart disease Mother      I have reviewed and agree with the history as documented  E-Cigarette/Vaping    E-Cigarette Use Never User      E-Cigarette/Vaping Substances     Social History     Tobacco Use    Smoking status: Former Smoker     Last attempt to quit: 2013     Years since quittin 3    Smokeless tobacco: Never Used   Substance Use Topics    Alcohol use: Not Currently     Frequency: Never     Binge frequency: Never    Drug use: Not Currently       Review of Systems   Constitutional: Negative for fever  HENT: Negative for rhinorrhea  Eyes: Negative for visual disturbance  Respiratory: Positive for cough, shortness of breath and wheezing  Cardiovascular: Negative for chest pain  Gastrointestinal: Positive for diarrhea and nausea  Negative for abdominal pain and vomiting  Endocrine: Negative for polydipsia  Genitourinary: Negative for dysuria, frequency and hematuria  Musculoskeletal: Negative for neck stiffness  Skin: Negative for rash  Allergic/Immunologic: Negative for immunocompromised state  Neurological: Negative for speech difficulty, weakness and numbness  Psychiatric/Behavioral: Negative for confusion  Physical Exam  Physical Exam   Constitutional: She is oriented to person, place, and time  She appears well-nourished  No distress  HENT:   Head: Normocephalic and atraumatic     Eyes: Conjunctivae and EOM are normal    Neck: Normal range of motion  Neck supple  Cardiovascular: Regular rhythm and normal heart sounds  Pulmonary/Chest: Effort normal  She has wheezes in the right upper field, the right middle field, the right lower field, the left upper field, the left middle field and the left lower field  Good air entry, slightly prolonged expiratory phase bilaterally   Abdominal: Soft  Bowel sounds are normal  She exhibits no mass  There is tenderness  There is no guarding  Mild tenderness to the epigastrium and periumbilical regions where she has the wound from her recent surgery  No evidence of wound infection   Musculoskeletal: She exhibits no edema  Residual erythema to the dorsum of the right hand  No increased warmth  No induration  No tenderness   Neurological: She is alert and oriented to person, place, and time  Skin: Skin is warm and dry  Psychiatric: She has a normal mood and affect         Vital Signs  ED Triage Vitals [03/14/20 1805]   Temperature Pulse Respirations Blood Pressure SpO2   (!) 97 2 °F (36 2 °C) 61 22 130/62 93 %      Temp Source Heart Rate Source Patient Position - Orthostatic VS BP Location FiO2 (%)   Temporal Monitor Sitting Left arm --      Pain Score       4           Vitals:    03/14/20 1930 03/14/20 2000 03/14/20 2115 03/14/20 2337   BP: 111/65 122/70  121/78   Pulse: 90 97 94 102   Patient Position - Orthostatic VS:             Visual Acuity      ED Medications  Medications   ipratropium (ATROVENT) 0 02 % inhalation solution 0 5 mg (0 5 mg Nebulization Given 3/14/20 1901)   albuterol inhalation solution 5 mg (5 mg Nebulization Given 3/14/20 1901)   dexamethasone (PF) (DECADRON) injection 10 mg (10 mg Intravenous Given 3/14/20 1857)   sodium chloride 0 9 % bolus 1,000 mL (0 mL Intravenous Stopped 3/14/20 2251)   ondansetron (ZOFRAN) injection 4 mg (4 mg Intravenous Given 3/14/20 1927)   iohexol (OMNIPAQUE) 350 MG/ML injection (MULTI-DOSE) 100 mL (100 mL Intravenous Given 3/14/20 2211) Diagnostic Studies  Results Reviewed     Procedure Component Value Units Date/Time    Comprehensive metabolic panel [[id number]]  (Abnormal) Collected:  03/14/20 1917    Lab Status:  Final result Specimen:  Blood from Arm, Right Updated:  03/14/20 1949     Sodium 129 mmol/L      Potassium 3 7 mmol/L      Chloride 98 mmol/L      CO2 26 mmol/L      ANION GAP 5 mmol/L      BUN 8 mg/dL      Creatinine 0 37 mg/dL      Glucose 129 mg/dL      Calcium 8 1 mg/dL      AST 54 U/L      ALT 35 U/L      Alkaline Phosphatase 211 U/L      Total Protein 5 5 g/dL      Albumin 2 8 g/dL      Total Bilirubin 0 40 mg/dL      eGFR 100 ml/min/1 73sq m     Narrative:       National Kidney Disease Foundation guidelines for Chronic Kidney Disease (CKD):     Stage 1 with normal or high GFR (GFR > 90 mL/min/1 73 square meters)    Stage 2 Mild CKD (GFR = 60-89 mL/min/1 73 square meters)    Stage 3A Moderate CKD (GFR = 45-59 mL/min/1 73 square meters)    Stage 3B Moderate CKD (GFR = 30-44 mL/min/1 73 square meters)    Stage 4 Severe CKD (GFR = 15-29 mL/min/1 73 square meters)    Stage 5 End Stage CKD (GFR <15 mL/min/1 73 square meters)  Note: GFR calculation is accurate only with a steady state creatinine    Influenza A/B and RSV PCR [[id number]]  (Normal) Collected:  03/14/20 1847    Lab Status:  Final result Specimen:  Nasopharyngeal Swab Updated:  03/14/20 1932     INFLUENZA A PCR None Detected     INFLUENZA B PCR None Detected     RSV PCR None Detected    B-Type Natriuretic Peptide ([address] [place] [address]) [[id number]]  (Normal) Collected:  03/14/20 1847    Lab Status:  Final result Specimen:  Blood from Arm, Left Updated:  03/14/20 1924     BNP 93 pg/mL     D-Dimer [[id number]]  (Abnormal) Collected:  03/14/20 1848    Lab Status:  Final result Specimen:  Blood from Arm, Left Updated:  03/14/20 1907     D-Dimer, Quant 0 38 ug/ml FEU     CBC and differential [[id number]]  (Abnormal) Collected:  03/14/20 1847    Lab Status:  Final result Specimen:  Blood from Arm, Left Updated:  03/14/20 1854     WBC 8 70 Thousand/uL      RBC 4 22 Million/uL      Hemoglobin 10 9 g/dL      Hematocrit 34 8 %      MCV 82 fL      MCH 25 8 pg      MCHC 31 4 g/dL      RDW 17 0 %      MPV 7 2 fL      Platelets 218 Thousands/uL      Neutrophils Relative 76 %      Lymphocytes Relative 15 %      Monocytes Relative 9 %      Eosinophils Relative 1 %      Basophils Relative 0 %      Neutrophils Absolute 6 60 Thousands/µL      Lymphocytes Absolute 1 30 Thousands/µL      Monocytes Absolute 0 80 Thousand/µL      Eosinophils Absolute 0 10 Thousand/µL      Basophils Absolute 0 00 Thousands/µL                  CTA chest ct abdomen pelvis w contrast   Final Result by Nery Pike MD (03/14 2300)      No evidence of pulmonary embolus  Status post gastrojejunostomy with persistent/residual prominent gastric distention                       Workstation performed: [id number]         XR chest 1 view portable   ED Interpretation by Sandra Muñiz MD (03/14 2159)   Differential positioning of the breasts creates a veil like opacity over the entire right lung field but otherwise no acute cardiopulmonary findings      Final Result by Mario Pederson MD (03/15 1020)      Persistent blunting left lateral costophrenic sulcus suspicious for small effusion            Workstation performed: [id number]                    Procedures  Procedures         ED Course                           Wells' Criteria for PE      Most Recent Value   Wells' Criteria for PE   Clinical signs and symptoms of DVT  0 Filed at: 03/14/2020 1939   PE is primary diagnosis or equally likely  0 Filed at: 03/14/2020 1939   HR >100  0 Filed at: 03/14/2020 1939   Immobilization at least 3 days or Surgery in the previous 4 weeks  1 5 Filed at: 03/14/2020 1939   Previous, objectively diagnosed PE or DVT  0 Filed at: 03/14/2020 1939   Hemoptysis  0 Filed at: 03/14/2020 1939   Malignancy with treatment within 6 months or palliative  0 Filed at: 03/14/2020 1939   Wells' Criteria Total  1 5 Filed at: 03/14/2020 1939            [facility]  Number of Diagnoses or Management Options  COPD exacerbation [facility]):   Diagnosis management comments: Shortness of breath seems to correlate clinically with prolonged expiratory flow an expiratory wheeze because she is post surgery I will check a D-dimer but will not get a CT angio unless the D-dimer is negative  As for her diarrhea, I will check a CT abdomen and pelvis although her abdominal exam is benign  Finally, her right hand has the appearance of resolving local reaction         Disposition  Final diagnoses:   COPD exacerbation ([address] )     Time reflects when diagnosis was documented in both MDM as applicable and the Disposition within this note     Time User Action Codes Description Comment    3/14/2020 11:20 PM Sunil Uribe [J44 1] COPD exacerbation [facility])       ED Disposition     ED Disposition Condition Date/Time Comment    Discharge Stable Sat Mar 14, 2020 11:20 PM Jessica Scripture discharge to home/self care              Follow-up Information     Follow up With Specialties Details Why Contact Info    Lucy Reid, DO Internal Medicine In 1 day  [place]  [address] [facility]  [phone]            Discharge Medication List as of 3/14/2020 11:20 PM      CONTINUE these medications which have NOT CHANGED    Details   acetaminophen (TYLENOL) 325 mg tablet Take 2 tablets (650 mg total) by mouth every 6 (six) hours as needed for mild pain, headaches or fever, Starting Fri 2/14/2020, Normal      al mag oxide-diphenhydramine-lidocaine viscous (MAGIC MOUTHWASH) 1:1:1 suspension Swish and swallow 10 mL every 4 (four) hours as needed for mouth pain or discomfort, Starting Fri 2/14/2020, Normal      albuterol (PROVENTIL HFA,VENTOLIN HFA) 90 mcg/act inhaler Inhale 2 puffs every 6 (six) hours as needed for wheezing or shortness of breath, Starting Tue 11/12/2019, Print      alendronate (FOSAMAX) 70 mg tablet Take by mouth every 7 days , Starting Tue 11/19/2019, Historical Med      ALPRAZolam (XANAX) 0 5 mg tablet Take 0 5 mg by mouth daily at bedtime as needed, Historical Med      apixaban (ELIQUIS) 5 mg Take 1 tablet (5 mg total) by mouth 2 (two) times a day, Starting Mon 12/9/2019, Normal      Artificial Tear Solution (GENTEAL TEARS) 0 1-0 2-0 3 % SOLN Administer 1 drop to both eyes 3 (three) times a day, Starting Fri 2/14/2020, Historical Med      Calcium Carb-Cholecalciferol (CALCIUM 1000 + D PO) Take 1,000 mg by mouth daily, Starting Wed 3/28/2018, Historical Med      cholestyramine sugar free (QUESTRAN LIGHT) 4 g packet Take 1 packet (4 g total) by mouth 2 (two) times a day, Starting Tue 11/12/2019, Print      dextran 70-hypromellose (GENTEAL TEARS) 0 1-0 3 % ophthalmic solution Administer 1 drop to both eyes 3 (three) times a day, Starting Fri 2/14/2020, Normal      diltiazem (CARDIZEM CD) 240 mg 24 hr capsule Take 1 capsule (240 mg total) by mouth daily, Starting Mon 12/9/2019, Normal      fluticasone-umeclidinium-vilanterol (TRELEGY ELLIPTA) 100-62 5-25 MCG/INH inhaler 1 puff daily, Starting Wed 9/4/2019, Historical Med      HYDROcodone-acetaminophen ([address]) 5-325 mg per tablet Starting Mon 2/24/2020, Historical Med      ipratropium-albuterol (DUO-NEB) 0 5-2 5 mg/3 mL nebulizer solution Take 1 vial (3 mL total) by nebulization every 6 (six) hours while awake, Starting Tue 11/12/2019, Print      iron polysaccharides (FERREX) 150 mg capsule Take 1 capsule (150 mg total) by mouth 2 (two) times a day, Starting Tue 11/12/2019, Print      loperamide (IMODIUM) 2 mg capsule Take 1 capsule (2 mg total) by mouth 3 (three) times a day as needed for diarrhea, Starting Tue 11/12/2019, Print      methocarbamol (ROBAXIN) 750 mg tablet Starting Mon 1/13/2020, Historical Med      metoprolol tartrate (LOPRESSOR) 25 mg tablet Take 0 5 tablets (12 5 mg total) by mouth every 12 (twelve) hours, Starting Mon 12/9/2019, Normal      montelukast (SINGULAIR) 10 mg tablet Take 1 tablet (10 mg total) by mouth daily at bedtime, Starting Tue 11/12/2019, Print      pantoprazole (PROTONIX) 40 mg tablet Take 1 tablet (40 mg total) by mouth daily in the early morning, Starting Wed 11/13/2019, Print      potassium chloride (MICRO-K) 10 MEQ CR capsule Take 1 capsule (10 mEq total) by mouth daily, Starting Tue 11/12/2019, Print      psyllium (METAMUCIL) packet Take 1 packet by mouth 3 (three) times a day, Starting Tue 11/12/2019, Print      simethicone (MYLICON) 80 mg chewable tablet Chew 1 tablet (80 mg total) every 6 (six) hours as needed for flatulence, Starting Fri 2/14/2020, Normal      traMADol (ULTRAM) 50 mg tablet Starting Tue 2/25/2020, Historical Med           No discharge procedures on file      PDMP Review       Value Time User    PDMP Reviewed  Yes 2/14/2020 10:04 AM Nabila Badillo          ED Provider  Electronically Signed by           Giovana Gilmore MD  03/19/20 1123

## 2020-03-14 NOTE — ED NOTES
C/o nausea-tx given   Wants water-told NPO except po contrast       Sushila Shaw, TATUM  03/14/20 1930

## 2020-03-15 NOTE — ED NOTES
Received call from patient x2 thus far asking if Dr Anil Andrade is here and where her antibiotics are  Pt states she was told at time of discharge the ER physician was going to call in antibiotics to her pharmacy  Dr Marquita Celestin was able to get in touch with Dr Anil Andrade who states patient should have Doxycycline 100mg PO BID x5 days, disp #10, Refills none  Script called to [facility] in [address] at request of patient and patient phoned and made aware of same  Pt to pick script up tomorrow AM and begin taking as directed/discussed by this RN  Pt verbalized understanding of same        Palomo Leon RN  03/15/20 9193

## 2020-03-16 ENCOUNTER — PATIENT OUTREACH (OUTPATIENT)
Dept: INTERNAL MEDICINE CLINIC | Facility: CLINIC | Age: 85
End: 2020-03-16

## 2020-03-16 LAB
ATRIAL RATE: 92 BPM
P AXIS: 74 DEGREES
PR INTERVAL: 136 MS
QRS AXIS: -73 DEGREES
QRSD INTERVAL: 92 MS
QT INTERVAL: 360 MS
QTC INTERVAL: 445 MS
T WAVE AXIS: 36 DEGREES
VENTRICULAR RATE: 92 BPM

## 2020-03-16 PROCEDURE — 93010 ELECTROCARDIOGRAM REPORT: CPT | Performed by: INTERNAL MEDICINE

## 2020-03-16 NOTE — PROGRESS NOTES
Outpatient Care Management Note:  Outreach call placed to preferred number  Phone answered by Grace's friend Arthur Aguirre  Advised to call Grace's home number  Outreach call placed to home number  Message left for patient to please return call  Contact information left on message  Patient does remain active with [facility]  From Visit:  03/06/2020 08:10 AM    PRIMARY FOCUS OF HOME HEALTH    integumentary  GASTRIC OUTLET OBSTRUCTION  Freeman Cyr POST GASTRECTOMY ON 2 3 PROCEDURE COMPLETED WAS ANTRECTOMY C BILROTH II ANASTOMOSIS    Assess edema  Moderate edema to ble's  Pt stated the edema is approved from a couple of days ago  Sn reivewed foods to avoid due to hi sodium content    pt switched from green olives to black olives that have less salt  PRECERT VISIT STATUS__CHECK VISIT COUNT    mc    ASSESS WOUND AND PERFORM WOUND CARE    Daily dressing change and prn if soilage  Cleanse skin with soap and water  Pat dry  Pack with 4x4 gauze and cover with ABD  Tape in place  IDENTIFY CAREGIVER TO TEACH    neighbor Hoang Wall   able to perform wound care on non nursing day    EDUCATION NEEDED    pain meds    wound care        pt has limited vision due to macular degeneration      pt having difficulty finding rides to dr douglass    [address] [place] [address]  In 2 weeks   Dr Johan Hagan  Pt did see the doctor on 3 11 and really like him    DISCHARGE PLAN  wounds heal     3 13   patient told sn that her neighbor was on her way over to her house now to take her to the urgent center  Pt explained that she was bitten by something   and pointed to ther r hand  R hand and RFA very swollen  Pt also told sn that she felt as if her breathing was a little "harder"  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  3 14   Patient did go to the Urgent Center, and was recommended by MD to go by ambulance to the hospital due to reaction impeding on patient's breathing Patient refused and stated that she wanted to go home  Zaid was given Benadryl at the urgent center  While home patient did start to feel better   breathing and swelling improved  however  patient did become nauseated  Pt nauseated today  Patient attributing the nausea to the Percocet and the Benadryl mixing together  Pt started on the Percocet 3 12 by Dr Andie Gonzalez  Patient reported that the nausea was subsiding by end of sn visit  R hand swelling decreased  Sn recommended applying warm compresses    3 14 Sn reviewed calling EMS if breathing declines

## 2020-03-17 ENCOUNTER — APPOINTMENT (EMERGENCY)
Dept: CT IMAGING | Facility: HOSPITAL | Age: 85
DRG: 382 | End: 2020-03-17
Payer: MEDICARE

## 2020-03-17 ENCOUNTER — TRANSCRIBE ORDERS (OUTPATIENT)
Dept: LAB | Facility: CLINIC | Age: 85
End: 2020-03-17

## 2020-03-17 ENCOUNTER — HOSPITAL ENCOUNTER (INPATIENT)
Facility: HOSPITAL | Age: 85
LOS: 5 days | Discharge: HOME HEALTH | DRG: 382 | End: 2020-03-22
Attending: EMERGENCY MEDICINE | Admitting: FAMILY MEDICINE
Payer: MEDICARE

## 2020-03-17 ENCOUNTER — APPOINTMENT (OUTPATIENT)
Dept: LAB | Facility: CLINIC | Age: 85
End: 2020-03-17
Payer: MEDICARE

## 2020-03-17 DIAGNOSIS — K31.1 GASTRIC OUTLET OBSTRUCTION: ICD-10-CM

## 2020-03-17 DIAGNOSIS — K52.9 INFLAMMATORY BOWEL DISEASE: ICD-10-CM

## 2020-03-17 DIAGNOSIS — T81.31XD WOUND DEHISCENCE, SURGICAL, SUBSEQUENT ENCOUNTER: ICD-10-CM

## 2020-03-17 DIAGNOSIS — K21.9 GERD (GASTROESOPHAGEAL REFLUX DISEASE): ICD-10-CM

## 2020-03-17 DIAGNOSIS — K56.609 SMALL BOWEL OBSTRUCTION (HCC): Primary | ICD-10-CM

## 2020-03-17 DIAGNOSIS — R53.81 DEBILITY: ICD-10-CM

## 2020-03-17 DIAGNOSIS — J44.9 COPD (CHRONIC OBSTRUCTIVE PULMONARY DISEASE) (HCC): ICD-10-CM

## 2020-03-17 DIAGNOSIS — I48.91 ATRIAL FIBRILLATION, UNSPECIFIED TYPE (HCC): ICD-10-CM

## 2020-03-17 DIAGNOSIS — K25.7 CHRONIC PEPTIC ULCER OF STOMACH: ICD-10-CM

## 2020-03-17 DIAGNOSIS — R19.7 DIARRHEA: ICD-10-CM

## 2020-03-17 DIAGNOSIS — K52.9 INFLAMMATORY BOWEL DISEASE: Primary | ICD-10-CM

## 2020-03-17 LAB
25(OH)D3 SERPL-MCNC: 46.8 NG/ML (ref 30–100)
ALBUMIN SERPL BCP-MCNC: 3.1 G/DL (ref 3.5–5.7)
ALP SERPL-CCNC: 339 U/L (ref 55–165)
ALT SERPL W P-5'-P-CCNC: 64 U/L (ref 7–52)
ANION GAP SERPL CALCULATED.3IONS-SCNC: 6 MMOL/L (ref 4–13)
ANION GAP SERPL CALCULATED.3IONS-SCNC: 8 MMOL/L (ref 4–13)
AST SERPL W P-5'-P-CCNC: 60 U/L (ref 13–39)
BASOPHILS # BLD AUTO: 0 THOUSANDS/ΜL (ref 0–0.1)
BASOPHILS NFR BLD AUTO: 0 % (ref 0–2)
BILIRUB SERPL-MCNC: 0.3 MG/DL (ref 0.2–1)
BUN SERPL-MCNC: 8 MG/DL (ref 7–25)
CALCIUM SERPL-MCNC: 8.6 MG/DL (ref 8.6–10.5)
CALCIUM SERPL-MCNC: 8.7 MG/DL (ref 8.3–10.1)
CHLORIDE SERPL-SCNC: 96 MMOL/L (ref 98–107)
CHLORIDE SERPL-SCNC: 99 MMOL/L (ref 100–108)
CO2 SERPL-SCNC: 28 MMOL/L (ref 21–31)
CO2 SERPL-SCNC: 30 MMOL/L (ref 21–32)
CREAT SERPL-MCNC: 0.39 MG/DL (ref 0.6–1.2)
EOSINOPHIL # BLD AUTO: 0 THOUSAND/ΜL (ref 0–0.61)
EOSINOPHIL NFR BLD AUTO: 0 % (ref 0–5)
ERYTHROCYTE [DISTWIDTH] IN BLOOD BY AUTOMATED COUNT: 15.8 % (ref 11.6–15.1)
ERYTHROCYTE [DISTWIDTH] IN BLOOD BY AUTOMATED COUNT: 16.4 % (ref 11.5–14.5)
ERYTHROCYTE [SEDIMENTATION RATE] IN BLOOD: 12 MM/HOUR (ref 0–20)
GFR SERPL CREATININE-BSD FRML MDRD: 98 ML/MIN/1.73SQ M
GLUCOSE SERPL-MCNC: 137 MG/DL (ref 65–99)
HCT VFR BLD AUTO: 37.8 % (ref 42–47)
HCT VFR BLD AUTO: 38.9 % (ref 34.8–46.1)
HGB BLD-MCNC: 11.5 G/DL (ref 11.5–15.4)
HGB BLD-MCNC: 11.6 G/DL (ref 12–16)
LACTATE SERPL-SCNC: 1.5 MMOL/L (ref 0.5–2)
LIPASE SERPL-CCNC: <10 U/L (ref 11–82)
LYMPHOCYTES # BLD AUTO: 1 THOUSANDS/ΜL (ref 0.6–4.47)
LYMPHOCYTES NFR BLD AUTO: 10 % (ref 21–51)
MCH RBC QN AUTO: 25.2 PG (ref 26.8–34.3)
MCH RBC QN AUTO: 25.2 PG (ref 26–34)
MCHC RBC AUTO-ENTMCNC: 29.6 G/DL (ref 31.4–37.4)
MCHC RBC AUTO-ENTMCNC: 30.7 G/DL (ref 31–37)
MCV RBC AUTO: 82 FL (ref 81–99)
MCV RBC AUTO: 85 FL (ref 82–98)
MONOCYTES # BLD AUTO: 0.8 THOUSAND/ΜL (ref 0.17–1.22)
MONOCYTES NFR BLD AUTO: 8 % (ref 2–12)
NEUTROPHILS # BLD AUTO: 8.1 THOUSANDS/ΜL (ref 1.4–6.5)
NEUTS SEG NFR BLD AUTO: 82 % (ref 42–75)
PLATELET # BLD AUTO: 375 THOUSANDS/UL (ref 149–390)
PLATELET # BLD AUTO: 386 THOUSANDS/UL (ref 149–390)
PMV BLD AUTO: 6.8 FL (ref 8.6–11.7)
PMV BLD AUTO: 9.3 FL (ref 8.9–12.7)
POTASSIUM SERPL-SCNC: 3.5 MMOL/L (ref 3.5–5.5)
POTASSIUM SERPL-SCNC: 4.2 MMOL/L (ref 3.5–5.3)
PROT SERPL-MCNC: 5.8 G/DL (ref 6.4–8.9)
RBC # BLD AUTO: 4.57 MILLION/UL (ref 3.81–5.12)
RBC # BLD AUTO: 4.62 MILLION/UL (ref 3.9–5.2)
SODIUM SERPL-SCNC: 132 MMOL/L (ref 134–143)
SODIUM SERPL-SCNC: 135 MMOL/L (ref 136–145)
TROPONIN I SERPL-MCNC: <0.03 NG/ML
WBC # BLD AUTO: 9.83 THOUSAND/UL (ref 4.31–10.16)
WBC # BLD AUTO: 9.9 THOUSAND/UL (ref 4.8–10.8)

## 2020-03-17 PROCEDURE — 82310 ASSAY OF CALCIUM: CPT

## 2020-03-17 PROCEDURE — 84484 ASSAY OF TROPONIN QUANT: CPT | Performed by: EMERGENCY MEDICINE

## 2020-03-17 PROCEDURE — 94760 N-INVAS EAR/PLS OXIMETRY 1: CPT

## 2020-03-17 PROCEDURE — 36415 COLL VENOUS BLD VENIPUNCTURE: CPT

## 2020-03-17 PROCEDURE — 74177 CT ABD & PELVIS W/CONTRAST: CPT

## 2020-03-17 PROCEDURE — 82306 VITAMIN D 25 HYDROXY: CPT

## 2020-03-17 PROCEDURE — 94640 AIRWAY INHALATION TREATMENT: CPT

## 2020-03-17 PROCEDURE — 80051 ELECTROLYTE PANEL: CPT

## 2020-03-17 PROCEDURE — 83605 ASSAY OF LACTIC ACID: CPT | Performed by: EMERGENCY MEDICINE

## 2020-03-17 PROCEDURE — 93005 ELECTROCARDIOGRAM TRACING: CPT

## 2020-03-17 PROCEDURE — 96361 HYDRATE IV INFUSION ADD-ON: CPT

## 2020-03-17 PROCEDURE — 85025 COMPLETE CBC W/AUTO DIFF WBC: CPT | Performed by: EMERGENCY MEDICINE

## 2020-03-17 PROCEDURE — C9113 INJ PANTOPRAZOLE SODIUM, VIA: HCPCS | Performed by: EMERGENCY MEDICINE

## 2020-03-17 PROCEDURE — 83690 ASSAY OF LIPASE: CPT | Performed by: EMERGENCY MEDICINE

## 2020-03-17 PROCEDURE — 96374 THER/PROPH/DIAG INJ IV PUSH: CPT

## 2020-03-17 PROCEDURE — 80053 COMPREHEN METABOLIC PANEL: CPT | Performed by: EMERGENCY MEDICINE

## 2020-03-17 PROCEDURE — 85652 RBC SED RATE AUTOMATED: CPT

## 2020-03-17 PROCEDURE — 99285 EMERGENCY DEPT VISIT HI MDM: CPT

## 2020-03-17 PROCEDURE — 85027 COMPLETE CBC AUTOMATED: CPT

## 2020-03-17 PROCEDURE — 99285 EMERGENCY DEPT VISIT HI MDM: CPT | Performed by: EMERGENCY MEDICINE

## 2020-03-17 RX ORDER — ONDANSETRON 2 MG/ML
4 INJECTION INTRAMUSCULAR; INTRAVENOUS ONCE
Status: COMPLETED | OUTPATIENT
Start: 2020-03-17 | End: 2020-03-17

## 2020-03-17 RX ORDER — KETOROLAC TROMETHAMINE 30 MG/ML
15 INJECTION, SOLUTION INTRAMUSCULAR; INTRAVENOUS EVERY 6 HOURS PRN
Status: DISCONTINUED | OUTPATIENT
Start: 2020-03-17 | End: 2020-03-22 | Stop reason: HOSPADM

## 2020-03-17 RX ORDER — ALBUTEROL SULFATE 2.5 MG/3ML
2.5 SOLUTION RESPIRATORY (INHALATION) EVERY 6 HOURS PRN
Status: DISCONTINUED | OUTPATIENT
Start: 2020-03-17 | End: 2020-03-17

## 2020-03-17 RX ORDER — IPRATROPIUM BROMIDE AND ALBUTEROL SULFATE 2.5; .5 MG/3ML; MG/3ML
3 SOLUTION RESPIRATORY (INHALATION) ONCE
Status: COMPLETED | OUTPATIENT
Start: 2020-03-17 | End: 2020-03-17

## 2020-03-17 RX ORDER — CHOLESTYRAMINE 4 G/9G
1 POWDER, FOR SUSPENSION ORAL DAILY
COMMUNITY
Start: 2020-01-07 | End: 2020-03-22 | Stop reason: HOSPADM

## 2020-03-17 RX ORDER — PANTOPRAZOLE SODIUM 40 MG/1
40 INJECTION, POWDER, FOR SOLUTION INTRAVENOUS ONCE
Status: COMPLETED | OUTPATIENT
Start: 2020-03-17 | End: 2020-03-17

## 2020-03-17 RX ORDER — BUDESONIDE AND FORMOTEROL FUMARATE DIHYDRATE 160; 4.5 UG/1; UG/1
2 AEROSOL RESPIRATORY (INHALATION) 2 TIMES DAILY
Status: DISCONTINUED | OUTPATIENT
Start: 2020-03-18 | End: 2020-03-22 | Stop reason: HOSPADM

## 2020-03-17 RX ORDER — SODIUM CHLORIDE 9 MG/ML
125 INJECTION, SOLUTION INTRAVENOUS CONTINUOUS
Status: DISCONTINUED | OUTPATIENT
Start: 2020-03-17 | End: 2020-03-19

## 2020-03-17 RX ORDER — PANTOPRAZOLE SODIUM 40 MG/1
40 INJECTION, POWDER, FOR SOLUTION INTRAVENOUS EVERY 12 HOURS SCHEDULED
Status: DISCONTINUED | OUTPATIENT
Start: 2020-03-18 | End: 2020-03-22 | Stop reason: HOSPADM

## 2020-03-17 RX ORDER — DOXYCYCLINE HYCLATE 100 MG
1 TABLET ORAL 2 TIMES DAILY
COMMUNITY
Start: 2020-03-16 | End: 2020-03-22 | Stop reason: HOSPADM

## 2020-03-17 RX ORDER — ONDANSETRON 2 MG/ML
4 INJECTION INTRAMUSCULAR; INTRAVENOUS EVERY 6 HOURS PRN
Status: DISCONTINUED | OUTPATIENT
Start: 2020-03-17 | End: 2020-03-22 | Stop reason: HOSPADM

## 2020-03-17 RX ORDER — IPRATROPIUM BROMIDE AND ALBUTEROL SULFATE 2.5; .5 MG/3ML; MG/3ML
3 SOLUTION RESPIRATORY (INHALATION)
Status: DISCONTINUED | OUTPATIENT
Start: 2020-03-18 | End: 2020-03-22 | Stop reason: HOSPADM

## 2020-03-17 RX ORDER — OXYCODONE AND ACETAMINOPHEN 7.5; 325 MG/1; MG/1
1 TABLET ORAL EVERY 6 HOURS PRN
COMMUNITY
Start: 2020-03-12 | End: 2020-03-22 | Stop reason: HOSPADM

## 2020-03-17 RX ADMIN — SODIUM CHLORIDE 250 ML: 0.9 INJECTION, SOLUTION INTRAVENOUS at 19:55

## 2020-03-17 RX ADMIN — IPRATROPIUM BROMIDE AND ALBUTEROL SULFATE 3 ML: 2.5; .5 SOLUTION RESPIRATORY (INHALATION) at 21:29

## 2020-03-17 RX ADMIN — ONDANSETRON 4 MG: 2 INJECTION INTRAMUSCULAR; INTRAVENOUS at 22:48

## 2020-03-17 RX ADMIN — ONDANSETRON 4 MG: 2 INJECTION INTRAMUSCULAR; INTRAVENOUS at 19:55

## 2020-03-17 RX ADMIN — IOHEXOL 100 ML: 350 INJECTION, SOLUTION INTRAVENOUS at 21:11

## 2020-03-17 RX ADMIN — SODIUM CHLORIDE 125 ML/HR: 0.9 INJECTION, SOLUTION INTRAVENOUS at 22:49

## 2020-03-17 RX ADMIN — PANTOPRAZOLE SODIUM 40 MG: 40 INJECTION, POWDER, LYOPHILIZED, FOR SOLUTION INTRAVENOUS at 22:50

## 2020-03-17 NOTE — ED PROVIDER NOTES
History  Chief Complaint   Patient presents with    Abdominal Pain    Diarrhea    Nausea     Patient is an 82-year-old female  Past medical history is significant for a recent exploratory laparotomy for gastric outlet obstruction antrectomy and Billroth II gastrojejunostomy approximately 3 weeks ago  Since then she has been suffering with diarrhea  She has a history of COPD  She is on home oxygen  She has atrial fibrillation and is on Eliquis  She presents to the emergency room today complaining of upper abdominal pain, nausea and continued diarrhea  In the emergency room she started vomiting  Mostly food  Not bilious  Not bloody  Patient is legally blind but does not think she has had melena or hematochezia  No fever or chills  No urinary or vaginal complaints  Once she vomited in the emergency room she reports that her abdominal pain felt better  She was seen in the emergency room approximately 3 days ago complaining of shortness of breath  She did undergo CT scan for pulmonary embolism  CT was negative for pulmonary embolism at that time  However, CT scan did show significant gastric distention at that time  Currently symptoms are moderately severe  Abdominal pain did improve with vomiting  No aggravating factors  Prior to Admission Medications   Prescriptions Last Dose Informant Patient Reported? Taking?    ALPRAZolam (XANAX) 0 5 mg tablet  Self Yes No   Sig: Take 0 5 mg by mouth daily at bedtime as needed   Artificial Tear Solution (GENTEAL TEARS) 0 1-0 2-0 3 % SOLN  Self Yes No   Sig: Administer 1 drop to both eyes 3 (three) times a day   Calcium Carb-Cholecalciferol (CALCIUM 1000 + D PO)  Self Yes No   Sig: Take 1,000 mg by mouth daily   HYDROcodone-acetaminophen (NORCO) 5-325 mg per tablet  Self Yes No   acetaminophen (TYLENOL) 325 mg tablet  Self No No   Sig: Take 2 tablets (650 mg total) by mouth every 6 (six) hours as needed for mild pain, headaches or fever   al [place] E[place] oxide-diphenhydramine-lidocaine viscous (MAGIC MOUTHWASH) 1:1:1 suspension  Self No No   Sig: Swish and swallow 10 mL every 4 (four) hours as needed for mouth pain or discomfort   albuterol (PROVENTIL HFA,VENTOLIN HFA) 90 mcg/act inhaler  Self No No   Sig: Inhale 2 puffs every 6 (six) hours as needed for wheezing or shortness of breath   alendronate (FOSAMAX) 70 mg tablet  Self Yes No   Sig: Take by mouth every 7 days    apixaban (ELIQUIS) 5 mg  Self No No   Sig: Take 1 tablet (5 mg total) by mouth 2 (two) times a day   cholestyramine (QUESTRAN) 4 g packet   Yes Yes   Sig: Take 1 packet by mouth daily   cholestyramine sugar free (QUESTRAN LIGHT) 4 g packet  Self No No   Sig: Take 1 packet (4 g total) by mouth 2 (two) times a day   dextran 70-hypromellose (GENTEAL TEARS) 0 1-0 3 % ophthalmic solution  Self No No   Sig: Administer 1 drop to both eyes 3 (three) times a day   diltiazem (CARDIZEM CD) 240 mg 24 hr capsule  Self No No   Sig: Take 1 capsule (240 mg total) by mouth daily   doxycycline hyclate (VIBRA-TABS) 100 mg tablet   Yes Yes   Sig: Take 1 tablet by mouth 2 (two) times a day   fluticasone-umeclidinium-vilanterol (TRELEGY ELLIPTA) 100-62 5-25 MCG/INH inhaler  Self Yes No   Si puff daily   ipratropium-albuterol (DUO-NEB) 0 5-2 5 mg/3 mL nebulizer solution  Self No No   Sig: Take 1 vial (3 mL total) by nebulization every 6 (six) hours while awake   iron polysaccharides (FERREX) 150 mg capsule  Self No No   Sig: Take 1 capsule (150 mg total) by mouth 2 (two) times a day   loperamide (IMODIUM) 2 mg capsule  Self No No   Sig: Take 1 capsule (2 mg total) by mouth 3 (three) times a day as needed for diarrhea   methocarbamol (ROBAXIN) 750 mg tablet  Self Yes No   metoprolol tartrate (LOPRESSOR) 25 mg tablet  Self No No   Sig: Take 0 5 tablets (12 5 mg total) by mouth every 12 (twelve) hours   montelukast (SINGULAIR) 10 mg tablet  Self No No   Sig: Take 1 tablet (10 mg total) by mouth daily at bedtime oxyCODONE-acetaminophen (PERCOCET) 7 5-325 MG per tablet   Yes Yes   Sig: Take 1 tablet by mouth every 6 (six) hours as needed every 6 to 8 hours if needed for pain   pantoprazole (PROTONIX) 40 mg tablet  Self No No   Sig: Take 1 tablet (40 mg total) by mouth daily in the early morning   potassium chloride (MICRO-K) 10 MEQ CR capsule  Self No No   Sig: Take 1 capsule (10 mEq total) by mouth daily   psyllium (METAMUCIL) packet  Self No No   Sig: Take 1 packet by mouth 3 (three) times a day   simethicone (MYLICON) 80 mg chewable tablet  Self No No   Sig: Chew 1 tablet (80 mg total) every 6 (six) hours as needed for flatulence   traMADol (ULTRAM) 50 mg tablet  Self Yes No      Facility-Administered Medications: None       Past Medical History:   Diagnosis Date    Asthma     Atrial fibrillation     Blurred vision     Cardiac disease     Colitis     COPD (chronic obstructive pulmonary disease)     Diverticulosis     DJD (degenerative joint disease)     Emphysema lung     Gait abnormality        Past Surgical History:   Procedure Laterality Date    BLADDER SURGERY      COLON SURGERY      COLONOSCOPY W/ POLYPECTOMY N/A 2019    Procedure: COLONOSCOPY W/ POLYPECTOMY;  Surgeon: Kaley Prado MD;  Location: [address] [place] [address] GI LAB; Service: General    GASTROJEJUNOSTOMY W/ JEJUNOSTOMY TUBE N/A 2/3/2020    Procedure: Antrectomy with bilroth II Anastomosis; Surgeon: Ren Colin MD;  Location: [facility];  Service: General    SMALL INTESTINE SURGERY         Family History   Problem Relation Age of Onset    Heart disease Mother      I have reviewed and agree with the history as documented      E-Cigarette/Vaping    E-Cigarette Use Never User      E-Cigarette/Vaping Substances     Social History     Tobacco Use    Smoking status: Former Smoker     Last attempt to quit: 2013     Years since quittin 3    Smokeless tobacco: Never Used   Substance Use Topics    Alcohol use: Not Currently     Frequency: Never     Binge frequency: Never    Drug use: Not Currently       Review of Systems   Constitutional: Negative for chills and fever  HENT: Negative for rhinorrhea and sore throat  Eyes: Negative for pain, redness and visual disturbance  Respiratory: Positive for cough and shortness of breath  Cardiovascular: Negative for chest pain and leg swelling  Gastrointestinal: Positive for abdominal pain, diarrhea, nausea and vomiting  Endocrine: Negative for polydipsia and polyuria  Genitourinary: Negative for dysuria, frequency, hematuria, vaginal bleeding and vaginal discharge  Musculoskeletal: Positive for back pain  Negative for neck pain  Back pain is chronic  Skin: Negative for rash and wound  Allergic/Immunologic: Negative for immunocompromised state  Neurological: Negative for weakness, numbness and headaches  Hematological: Does not bruise/bleed easily  Psychiatric/Behavioral: Negative for hallucinations and suicidal ideas  All other systems reviewed and are negative  Physical Exam  Physical Exam   Constitutional: She is oriented to person, place, and time  She appears well-developed and well-nourished  She appears distressed  Patient is vomiting in the emergency room  HENT:   Head: Normocephalic and atraumatic  Mouth/Throat: Oropharynx is clear and moist    Eyes: Conjunctivae are normal  Right eye exhibits no discharge  Left eye exhibits no discharge  No scleral icterus  Neck: Normal range of motion  Neck supple  Cardiovascular: Normal heart sounds and intact distal pulses  Exam reveals no gallop and no friction rub  No murmur heard  Tachycardic  Irregular regular rate and rhythm  Pulmonary/Chest: Effort normal  No stridor  No respiratory distress  She has no wheezes  She has no rales  Diminished breath sounds bilaterally  Abdominal: Soft  Bowel sounds are normal  She exhibits distension  There is no tenderness   There is no rigidity, no rebound, no guarding and no CVA tenderness  No hernia  There is some superficial dehiscence to the surgical wound  It is granulating in well  No erythema or drainage  Musculoskeletal: Normal range of motion  She exhibits no edema, tenderness or deformity  No calf tenderness  1+ pedal edema bilaterally  Neurological: She is alert and oriented to person, place, and time  She has normal strength  No sensory deficit  GCS eye subscore is 4  GCS verbal subscore is 5  GCS motor subscore is 6  Skin: Skin is warm and dry  No rash noted  Psychiatric: She has a normal mood and affect  Her behavior is normal    Vitals reviewed        Vital Signs  ED Triage Vitals [03/17/20 1845]   Temperature Pulse Respirations Blood Pressure SpO2   99 5 °F (37 5 °C) 90 (!) 24 134/88 95 %      Temp Source Heart Rate Source Patient Position - Orthostatic VS BP Location FiO2 (%)   Temporal Monitor Lying Left arm --      Pain Score       7           Vitals:    03/17/20 1845 03/17/20 2000 03/17/20 2114   BP: 134/88 122/65 122/63   Pulse: 90 95 97   Patient Position - Orthostatic VS: Lying Lying          Visual Acuity      ED Medications  Medications   sodium chloride 0 9 % infusion (has no administration in time range)   pantoprazole (PROTONIX) injection 40 mg (has no administration in time range)   morphine injection 2 mg (has no administration in time range)   ondansetron (ZOFRAN) injection 4 mg (has no administration in time range)   sodium chloride 0 9 % bolus 250 mL (0 mL Intravenous Stopped 3/17/20 2033)   ondansetron (ZOFRAN) injection 4 mg (4 mg Intravenous Given 3/17/20 1955)   ipratropium-albuterol (DUO-NEB) 0 5-2 5 mg/3 mL inhalation solution 3 mL (3 mL Nebulization Given 3/17/20 2129)   iohexol (OMNIPAQUE) 350 MG/ML injection (MULTI-DOSE) 100 mL (100 mL Intravenous Given 3/17/20 2111)       Diagnostic Studies  Results Reviewed     Procedure Component Value Units Date/Time    Clostridium difficile toxin by PCR with EIA [[id number]] Lab Status:  No result Specimen:  Stool     Lactic acid, plasma x2 [[id number]]  (Normal) Collected:  03/17/20 1943    Lab Status:  Final result Specimen:  Blood from Arm, Right Updated:  03/17/20 2012     LACTIC ACID 1 5 mmol/L     Narrative:       Result may be elevated if tourniquet was used during collection      Lipase [[id number]]  (Abnormal) Collected:  03/17/20 1943    Lab Status:  Final result Specimen:  Blood from Arm, Right Updated:  03/17/20 2012     Lipase <10 u/L     Comprehensive metabolic panel [[id number]]  (Abnormal) Collected:  03/17/20 1943    Lab Status:  Final result Specimen:  Blood from Arm, Right Updated:  03/17/20 2012     Sodium 132 mmol/L      Potassium 3 5 mmol/L      Chloride 96 mmol/L      CO2 28 mmol/L      ANION GAP 8 mmol/L      BUN 8 mg/dL      Creatinine 0 39 mg/dL      Glucose 137 mg/dL      Calcium 8 6 mg/dL      AST 60 U/L      ALT 64 U/L      Alkaline Phosphatase 339 U/L      Total Protein 5 8 g/dL      Albumin 3 1 g/dL      Total Bilirubin 0 30 mg/dL      eGFR 98 ml/min/1 73sq m     Narrative:       [place] guidelines for Chronic Kidney Disease (CKD):     Stage 1 with normal or high GFR (GFR > 90 mL/min/1 73 square meters)    Stage 2 Mild CKD (GFR = 60-89 mL/min/1 73 square meters)    Stage 3A Moderate CKD (GFR = 45-59 mL/min/1 73 square meters)    Stage 3B Moderate CKD (GFR = 30-44 mL/min/1 73 square meters)    Stage 4 Severe CKD (GFR = 15-29 mL/min/1 73 square meters)    Stage 5 End Stage CKD (GFR <15 mL/min/1 73 square meters)  Note: GFR calculation is accurate only with a steady state creatinine    Troponin I [[id number]]  (Normal) Collected:  03/17/20 1943    Lab Status:  Final result Specimen:  Blood from Arm, Right Updated:  03/17/20 2009     Troponin I <0 03 ng/mL     CBC and differential [[id number]]  (Abnormal) Collected:  03/17/20 1943    Lab Status:  Final result Specimen:  Blood from Arm, Right Updated:  03/17/20 1951     WBC 9 90 Thousand/uL      RBC 4 62 Million/uL      Hemoglobin 11 6 g/dL      Hematocrit 37 8 %      MCV 82 fL      MCH 25 2 pg      MCHC 30 7 g/dL      RDW 16 4 %      MPV 6 8 fL      Platelets 264 Thousands/uL      Neutrophils Relative 82 %      Lymphocytes Relative 10 %      Monocytes Relative 8 %      Eosinophils Relative 0 %      Basophils Relative 0 %      Neutrophils Absolute 8 10 Thousands/µL      Lymphocytes Absolute 1 00 Thousands/µL      Monocytes Absolute 0 80 Thousand/µL      Eosinophils Absolute 0 00 Thousand/µL      Basophils Absolute 0 00 Thousands/µL     Lactic acid, plasma x2 [[id number]]     Lab Status:  No result Specimen:  Blood     UA w Reflex to Microscopic w Reflex to Culture [[id number]]     Lab Status:  No result Specimen:  Urine                  CT abdomen pelvis with contrast   Final Result by Narcisa Espinal MD (03/17 2131)      Status post gastrojejunostomy with chronic marked dilatation of remaining stomach and neutral dilatation of proximal jejunum  The possibility of a proximal jejunal obstruction should be considered               Workstation performed: [id number]                    Procedures  ECG 12 Lead Documentation Only  Date/Time: 3/17/2020 8:55 PM  Performed by: Ced Garcia MD  Authorized by: Ced Garcia MD     ECG reviewed by me, the ED Provider: yes    Patient location:  ED  Comments:      Normal sinus rhythm  Low-voltage QRS  Left anterior fascicular block  Poor R-wave progression  No acute ischemic ST or T-wave changes  ED Course                                 MDM  Number of Diagnoses or Management Options  Diagnosis management comments: Laboratory evaluation an EKG was nonspecific  CT scan did, however  , show dilation of the stomach and part of the duodenum suggestive of eye recurrent obstruction  This would be consistent with her presentation  Consulted with her surgeon Dr Alexis Stewart and PA on-call for Dr Jeb Lee, her primary MD for admission    Dr Alexis Stewart recommended NPO, IV fluids and Protonix  Will see in am        Amount and/or Complexity of Data Reviewed  Clinical lab tests: ordered and reviewed  Tests in the radiology section of CPT®: reviewed and ordered  Review and summarize past medical records: yes  Discuss the patient with other providers: yes  Independent visualization of images, tracings, or specimens: yes          Disposition  Final diagnoses:   Small bowel obstruction ([facility] )   COPD [facility]chronic obstructive pulmonary disease) [facility] )     Time reflects when diagnosis was documented in both MDM as applicable and the Disposition within this note     Time User Action Codes Description Comment    3/17/2020 10:29 PM Reed Phipps Add [K56 609] Small bowel obstruction ([facility] )     3/17/2020 10:30 PM Reed Phipps Add [J44 9] COPD (chronic obstructive pulmonary disease) [facility])       ED Disposition     ED Disposition Condition Date/Time Comment    Admit Stable Tue Mar 17, 2020 10:29 PM         Follow-up Information    None         Patient's Medications   Discharge Prescriptions    No medications on file     No discharge procedures on file      PDMP Review       Value Time User    PDMP Reviewed  Yes 2/14/2020 10:04 AM [address], [address] [facility]          ED Provider  Electronically Signed by           Julia Amos MD  03/17/20 3082

## 2020-03-18 ENCOUNTER — TELEPHONE (OUTPATIENT)
Dept: OTHER | Facility: OTHER | Age: 85
End: 2020-03-18

## 2020-03-18 ENCOUNTER — PATIENT OUTREACH (OUTPATIENT)
Dept: INTERNAL MEDICINE CLINIC | Facility: CLINIC | Age: 85
End: 2020-03-18

## 2020-03-18 PROBLEM — K25.7: Status: ACTIVE | Noted: 2020-03-18

## 2020-03-18 LAB
ALBUMIN SERPL BCP-MCNC: 2.7 G/DL (ref 3.5–5.7)
ALP SERPL-CCNC: 363 U/L (ref 55–165)
ALT SERPL W P-5'-P-CCNC: 81 U/L (ref 7–52)
ANION GAP SERPL CALCULATED.3IONS-SCNC: 7 MMOL/L (ref 4–13)
AST SERPL W P-5'-P-CCNC: 88 U/L (ref 13–39)
ATRIAL RATE: 97 BPM
BASOPHILS # BLD AUTO: 0 THOUSANDS/ΜL (ref 0–0.1)
BASOPHILS NFR BLD AUTO: 0 % (ref 0–2)
BILIRUB SERPL-MCNC: 0.3 MG/DL (ref 0.2–1)
BUN SERPL-MCNC: 6 MG/DL (ref 7–25)
CALCIUM SERPL-MCNC: 8.1 MG/DL (ref 8.6–10.5)
CHLORIDE SERPL-SCNC: 98 MMOL/L (ref 98–107)
CO2 SERPL-SCNC: 29 MMOL/L (ref 21–31)
CREAT SERPL-MCNC: 0.39 MG/DL (ref 0.6–1.2)
EOSINOPHIL # BLD AUTO: 0 THOUSAND/ΜL (ref 0–0.61)
EOSINOPHIL NFR BLD AUTO: 0 % (ref 0–5)
ERYTHROCYTE [DISTWIDTH] IN BLOOD BY AUTOMATED COUNT: 16.7 % (ref 11.5–14.5)
GFR SERPL CREATININE-BSD FRML MDRD: 98 ML/MIN/1.73SQ M
GLUCOSE SERPL-MCNC: 92 MG/DL (ref 65–99)
HCT VFR BLD AUTO: 35 % (ref 42–47)
HGB BLD-MCNC: 11.1 G/DL (ref 12–16)
LYMPHOCYTES # BLD AUTO: 1.3 THOUSANDS/ΜL (ref 0.6–4.47)
LYMPHOCYTES NFR BLD AUTO: 16 % (ref 21–51)
MCH RBC QN AUTO: 25.7 PG (ref 26–34)
MCHC RBC AUTO-ENTMCNC: 31.6 G/DL (ref 31–37)
MCV RBC AUTO: 82 FL (ref 81–99)
MONOCYTES # BLD AUTO: 0.8 THOUSAND/ΜL (ref 0.17–1.22)
MONOCYTES NFR BLD AUTO: 9 % (ref 2–12)
NEUTROPHILS # BLD AUTO: 6.4 THOUSANDS/ΜL (ref 1.4–6.5)
NEUTS SEG NFR BLD AUTO: 75 % (ref 42–75)
P AXIS: 80 DEGREES
PLATELET # BLD AUTO: 328 THOUSANDS/UL (ref 149–390)
PMV BLD AUTO: 7.1 FL (ref 8.6–11.7)
POTASSIUM SERPL-SCNC: 3.3 MMOL/L (ref 3.5–5.5)
PR INTERVAL: 134 MS
PROT SERPL-MCNC: 5 G/DL (ref 6.4–8.9)
QRS AXIS: -69 DEGREES
QRSD INTERVAL: 92 MS
QT INTERVAL: 356 MS
QTC INTERVAL: 452 MS
RBC # BLD AUTO: 4.3 MILLION/UL (ref 3.9–5.2)
SODIUM SERPL-SCNC: 134 MMOL/L (ref 134–143)
T WAVE AXIS: 77 DEGREES
VENTRICULAR RATE: 97 BPM
WBC # BLD AUTO: 8.5 THOUSAND/UL (ref 4.8–10.8)

## 2020-03-18 PROCEDURE — 94760 N-INVAS EAR/PLS OXIMETRY 1: CPT

## 2020-03-18 PROCEDURE — 87081 CULTURE SCREEN ONLY: CPT | Performed by: PHYSICIAN ASSISTANT

## 2020-03-18 PROCEDURE — 94640 AIRWAY INHALATION TREATMENT: CPT

## 2020-03-18 PROCEDURE — 93010 ELECTROCARDIOGRAM REPORT: CPT | Performed by: INTERNAL MEDICINE

## 2020-03-18 PROCEDURE — 85025 COMPLETE CBC W/AUTO DIFF WBC: CPT | Performed by: PHYSICIAN ASSISTANT

## 2020-03-18 PROCEDURE — 87147 CULTURE TYPE IMMUNOLOGIC: CPT | Performed by: PHYSICIAN ASSISTANT

## 2020-03-18 PROCEDURE — 80053 COMPREHEN METABOLIC PANEL: CPT | Performed by: PHYSICIAN ASSISTANT

## 2020-03-18 PROCEDURE — 99024 POSTOP FOLLOW-UP VISIT: CPT | Performed by: SPECIALIST

## 2020-03-18 PROCEDURE — C9113 INJ PANTOPRAZOLE SODIUM, VIA: HCPCS | Performed by: PHYSICIAN ASSISTANT

## 2020-03-18 RX ORDER — ALBUTEROL SULFATE 90 UG/1
2 AEROSOL, METERED RESPIRATORY (INHALATION) EVERY 6 HOURS PRN
Status: DISCONTINUED | OUTPATIENT
Start: 2020-03-18 | End: 2020-03-22 | Stop reason: HOSPADM

## 2020-03-18 RX ORDER — METOCLOPRAMIDE HYDROCHLORIDE 5 MG/ML
10 INJECTION INTRAMUSCULAR; INTRAVENOUS EVERY 6 HOURS SCHEDULED
Status: DISCONTINUED | OUTPATIENT
Start: 2020-03-18 | End: 2020-03-22 | Stop reason: HOSPADM

## 2020-03-18 RX ORDER — MINERAL OIL AND PETROLATUM 150; 830 MG/G; MG/G
OINTMENT OPHTHALMIC 3 TIMES DAILY PRN
Status: DISCONTINUED | OUTPATIENT
Start: 2020-03-18 | End: 2020-03-22 | Stop reason: HOSPADM

## 2020-03-18 RX ORDER — FLUCONAZOLE 100 MG/1
200 TABLET ORAL DAILY
Status: DISCONTINUED | OUTPATIENT
Start: 2020-03-18 | End: 2020-03-19

## 2020-03-18 RX ADMIN — ENOXAPARIN SODIUM 40 MG: 40 INJECTION SUBCUTANEOUS at 09:47

## 2020-03-18 RX ADMIN — IPRATROPIUM BROMIDE AND ALBUTEROL SULFATE 3 ML: 2.5; .5 SOLUTION RESPIRATORY (INHALATION) at 08:06

## 2020-03-18 RX ADMIN — PANTOPRAZOLE SODIUM 40 MG: 40 INJECTION, POWDER, FOR SOLUTION INTRAVENOUS at 09:47

## 2020-03-18 RX ADMIN — KETOROLAC TROMETHAMINE 15 MG: 30 INJECTION, SOLUTION INTRAMUSCULAR at 21:23

## 2020-03-18 RX ADMIN — KETOROLAC TROMETHAMINE 15 MG: 30 INJECTION, SOLUTION INTRAMUSCULAR at 13:25

## 2020-03-18 RX ADMIN — IPRATROPIUM BROMIDE AND ALBUTEROL SULFATE 3 ML: 2.5; .5 SOLUTION RESPIRATORY (INHALATION) at 13:43

## 2020-03-18 RX ADMIN — METOCLOPRAMIDE 10 MG: 5 INJECTION, SOLUTION INTRAMUSCULAR; INTRAVENOUS at 17:40

## 2020-03-18 RX ADMIN — IPRATROPIUM BROMIDE AND ALBUTEROL SULFATE 3 ML: 2.5; .5 SOLUTION RESPIRATORY (INHALATION) at 19:47

## 2020-03-18 RX ADMIN — PANTOPRAZOLE SODIUM 40 MG: 40 INJECTION, POWDER, FOR SOLUTION INTRAVENOUS at 21:23

## 2020-03-18 RX ADMIN — BUDESONIDE AND FORMOTEROL FUMARATE DIHYDRATE 2 PUFF: 160; 4.5 AEROSOL RESPIRATORY (INHALATION) at 17:39

## 2020-03-18 RX ADMIN — SODIUM CHLORIDE 125 ML/HR: 0.9 INJECTION, SOLUTION INTRAVENOUS at 07:59

## 2020-03-18 RX ADMIN — BUDESONIDE AND FORMOTEROL FUMARATE DIHYDRATE 2 PUFF: 160; 4.5 AEROSOL RESPIRATORY (INHALATION) at 09:46

## 2020-03-18 RX ADMIN — KETOROLAC TROMETHAMINE 15 MG: 30 INJECTION, SOLUTION INTRAMUSCULAR at 02:30

## 2020-03-18 RX ADMIN — METOCLOPRAMIDE 10 MG: 5 INJECTION, SOLUTION INTRAMUSCULAR; INTRAVENOUS at 13:26

## 2020-03-18 RX ADMIN — ONDANSETRON 4 MG: 2 INJECTION INTRAMUSCULAR; INTRAVENOUS at 02:30

## 2020-03-18 RX ADMIN — FLUCONAZOLE 200 MG: 100 TABLET ORAL at 11:48

## 2020-03-18 NOTE — ED NOTES
Pt returned from [address] [facility], [facility]  03/17/20 8638

## 2020-03-18 NOTE — CONSULTS
Consultation - Mary Beth Frank 82 y o  female MRN: [id number]  Unit/Bed#: -01 Encounter: [id number]      Assessment/Plan     Assessment:  Dysphagia nausea and vomiting, also diarrhea, partial bowel obstruction or infectious enteritis suspected  Symptoms seem to be resolving  She does have some mild dysphagia  Plan:  Would continue Protonix b i d     Will add Diflucan 200 mg daily for possible Candida esophagitis  No EGD is needed at this time  Further workup as per surgery  History of Present Illness   Physician Requesting Consult: Krystal Young MD  Reason for Consult / Principal Problem:  Dysphagia, nausea and vomiting  Hx and PE limited by:   HPI: Torrie Nascimento is a 82y o  year old female who presents with nausea of vomiting crampy abdominal pain and dysphagia  CT scan was suggestive for partial bowel obstruction  The patient recently had surgery for gastric outlet obstruction  Today she is feeling somewhat better with out diarrhea and with no abdominal pain  She still has some residual dysphagia  She feels a burning in the chest   She is now on IV Protonix  Consults    Review of Systems   Constitutional: Positive for activity change, appetite change and fatigue  HENT: Negative  Eyes: Negative  Respiratory: Negative  Cardiovascular: Negative  Gastrointestinal: Positive for abdominal pain, diarrhea and nausea  Genitourinary: Negative  Musculoskeletal: Positive for back pain  Neurological: Negative  Hematological: Negative  Psychiatric/Behavioral: Negative          Historical Information   Past Medical History:   Diagnosis Date    Asthma     Atrial fibrillation     Blurred vision     Cardiac disease     Colitis     COPD (chronic obstructive pulmonary disease)     Diverticulosis     DJD (degenerative joint disease)     Emphysema lung     Gait abnormality      Past Surgical History:   Procedure Laterality Date    BLADDER SURGERY      COLON SURGERY      COLONOSCOPY W/ POLYPECTOMY N/A 2019    Procedure: COLONOSCOPY W/ POLYPECTOMY;  Surgeon: Keller Dandy, MD;  Location: [address] [place] [address] GI LAB; Service: General    GASTROJEJUNOSTOMY W/ JEJUNOSTOMY TUBE N/A 2/3/2020    Procedure: Antrectomy with bilroth II Anastomosis; Surgeon: Maura Caldwell MD;  Location:  MAIN OR;  Service: General    SMALL INTESTINE SURGERY       Social History   Social History     Substance and Sexual Activity   Alcohol Use Not Currently    Frequency: Never    Binge frequency: Never     Social History     Substance and Sexual Activity   Drug Use Not Currently     E-Cigarette/Vaping    E-Cigarette Use Never User      E-Cigarette/Vaping Substances     Social History     Tobacco Use   Smoking Status Former Smoker    Last attempt to quit: 2013    Years since quittin 3   Smokeless Tobacco Never Used     Family History: non-contributory    Meds/Allergies   all current active meds have been reviewed    Allergies   Allergen Reactions    Bee Venom     Fluticasone        Objective       Intake/Output Summary (Last 24 hours) at 3/18/2020 0928  Last data filed at 3/17/2020 2033  Gross per 24 hour   Intake 500 ml   Output    Net 500 ml       Invasive Devices:   Peripheral IV 20 Right Antecubital (Active)   Site Assessment Clean;Dry; Intact 3/17/2020  7:44 PM   Dressing Type Transparent 3/17/2020  7:44 PM   Line Status Blood return noted; Flushed;Saline locked 3/17/2020  7:44 PM   Dressing Status Clean;Dry; Intact 3/17/2020  7:44 PM       External Urinary Catheter (Active)     Current Facility-Administered Medications   Medication Dose Route Frequency Provider Last Rate Last Dose    budesonide-formoterol (SYMBICORT) 160-4 5 mcg/act inhaler 2 puff  2 puff Inhalation BID Richard Perry PA-C        enoxaparin (LOVENOX) subcutaneous injection 40 mg  40 mg Subcutaneous Daily Jero Perry PA-C        fluconazole (DIFLUCAN) tablet 200 mg  200 mg Oral Daily Rolly Gomez MD        ipratropium-albuterol (DUO-NEB) 0 5-2 5 mg/3 mL inhalation solution 3 mL  3 mL Nebulization Q6H While awake CELSO Felipe   3 mL at 03/18/20 0806    ketorolac (TORADOL) injection 15 mg  15 mg Intravenous Q6H PRN Jero Perry PA-C   15 mg at 03/18/20 0230    ondansetron (ZOFRAN) injection 4 mg  4 mg Intravenous Q6H PRN Jero Perry PA-C   4 mg at 03/18/20 0230    pantoprazole (PROTONIX) injection 40 mg  40 mg Intravenous Q12H [address] Jero Perry PA-C        sodium chloride 0 9 % infusion  125 mL/hr Intravenous Continuous Garcia He  mL/hr at 03/18/20 0759 125 mL/hr at 03/18/20 0759     Physical Exam   Constitutional: She is oriented to person, place, and time  She appears well-developed  HENT:   Head: Normocephalic and atraumatic  Pulmonary/Chest: Effort normal and breath sounds normal    Abdominal: Soft  Bowel sounds are normal    Neurological: She is alert and oriented to person, place, and time  Skin: Skin is warm and dry  Lab Results: I have personally reviewed pertinent reports  Imaging Studies: I have personally reviewed pertinent reports  EKG, Pathology, and Other Studies: I have personally reviewed pertinent reports  VTE Prophylaxis: Enoxaparin (Lovenox)    Counseling / Coordination of Care  Total floor / unit time spent today 60 minutes  Greater than 50% of total time was spent with the patient and / or family counseling and / or coordination of care  A description of the counseling / coordination of care:  Case to be discussed with attending physician

## 2020-03-18 NOTE — CONSULTS
Consultation - General Surgery   Amina Agarwal 82 y o  female MRN: [id number]  Unit/Bed#: -01 Encounter: [id number]    Assessment/Plan     Assessment:  The patient is a frail 24-year-old white female who is 6 a half weeks out from antrectomy and Billroth II gastric jejunostomy reconstruction for a complicated peptic ulcer causing gastric outlet obstruction  The patient has been experiencing increasing reflux symptoms, and on the day of admission experienced projectile vomiting  Appearance of her admission CT scan suggests there may be a proximal small-bowel obstruction leading to dilation of the gastric remnant and efferent jejunal limb of the gastrojejunostomy  Plan:  Admission for IV hydration and bowel rest   Gastrografin small bowel series    History of Present Illness     HPI:  Amina Agarwal is a 82 y o  female who presents with projectile vomiting suggestive of small-bowel obstruction at the level of the jejunum with resulting dilation the gastric remnant and efferrent limb of her Billroth II gastrojejunostomy  The patient presented originally 6 weeks prior with gastric outlet obstruction with resulting esophageal dilatation from what proved to be complicated peptic ulcer disease  She underwent surgery at [facility] in [place], with a complicated postoperative course  He has been followed for dehiscence of her midline wound, which is now healing  She has been experiencing increasing reflux symptoms associated with abdominal distension, and now projectile vomiting  She is currently comfortable at bed rest     Consult to surgery general  Consult performed by: Yeni Bob MD  Consult ordered by: Dakota Collier MD          Review of Systems   Constitutional: Negative for chills and fever  HENT: Positive for trouble swallowing  Respiratory: Positive for shortness of breath  Cardiovascular: Negative for chest pain and palpitations     Gastrointestinal: Positive for abdominal distention, abdominal pain, diarrhea and vomiting  Negative for blood in stool  Genitourinary: Positive for difficulty urinating  Skin: Positive for wound  Psychiatric/Behavioral: The patient is nervous/anxious  All other systems reviewed and are negative  Historical Information   Past Medical History:   Diagnosis Date    Asthma     Atrial fibrillation     Blurred vision     Cardiac disease     Colitis     COPD (chronic obstructive pulmonary disease)     Diverticulosis     DJD (degenerative joint disease)     Emphysema lung     Gait abnormality      Past Surgical History:   Procedure Laterality Date    BLADDER SURGERY      COLON SURGERY      COLONOSCOPY W/ POLYPECTOMY N/A 2019    Procedure: COLONOSCOPY W/ POLYPECTOMY;  Surgeon: Alfredo Chavez MD;  Location: [address] [place] [address] GI LAB; Service: General    GASTROJEJUNOSTOMY W/ JEJUNOSTOMY TUBE N/A 2/3/2020    Procedure: Antrectomy with bilroth II Anastomosis;   Surgeon: Amisha Enriquez MD;  Location: [facility];  Service: General    SMALL INTESTINE SURGERY       Social History   Social History     Substance and Sexual Activity   Alcohol Use Not Currently    Frequency: Never    Binge frequency: Never     Social History     Substance and Sexual Activity   Drug Use Not Currently     E-Cigarette/Vaping    E-Cigarette Use Never User      E-Cigarette/Vaping Substances     Social History     Tobacco Use   Smoking Status Former Smoker    Last attempt to quit: 2013    Years since quittin 3   Smokeless Tobacco Never Used     Family History: non-contributory    Meds/Allergies   current meds:   Current Facility-Administered Medications   Medication Dose Route Frequency    albuterol (PROVENTIL HFA,VENTOLIN HFA) inhaler 2 puff  2 puff Inhalation Q6H PRN    artificial tear (LUBRIFRESH P M ) ophthalmic ointment   Both Eyes TID PRN    budesonide-formoterol (SYMBICORT) 160-4 5 mcg/act inhaler 2 puff  2 puff Inhalation BID    enoxaparin (LOVENOX) subcutaneous injection 40 mg  40 mg Subcutaneous Daily    fluconazole (DIFLUCAN) tablet 200 mg  200 mg Oral Daily    ipratropium-albuterol (DUO-NEB) 0 5-2 5 mg/3 mL inhalation solution 3 mL  3 mL Nebulization Q6H While awake    ketorolac (TORADOL) injection 15 mg  15 mg Intravenous Q6H PRN    metoclopramide (REGLAN) injection 10 mg  10 mg Intravenous Q6H [address]    ondansetron (ZOFRAN) injection 4 mg  4 mg Intravenous Q6H PRN    pantoprazole (PROTONIX) injection 40 mg  40 mg Intravenous Q12H [address]    sodium chloride 0 9 % infusion  125 mL/hr Intravenous Continuous    and PTA meds:   Prior to Admission Medications   Prescriptions Last Dose Informant Patient Reported? Taking?    ALPRAZolam (XANAX) 0 5 mg tablet  Self Yes No   Sig: Take 0 5 mg by mouth daily at bedtime as needed   Artificial Tear Solution (GENTEAL TEARS) 0 1-0 2-0 3 % SOLN  Self Yes No   Sig: Administer 1 drop to both eyes 3 (three) times a day   Calcium Carb-Cholecalciferol (CALCIUM 1000 + D PO)  Self Yes No   Sig: Take 1,000 mg by mouth daily   HYDROcodone-acetaminophen (NORCO) 5-325 mg per tablet  Self Yes No   acetaminophen (TYLENOL) 325 mg tablet  Self No No   Sig: Take 2 tablets (650 mg total) by mouth every 6 (six) hours as needed for mild pain, headaches or fever   al mag oxide-diphenhydramine-lidocaine viscous (MAGIC MOUTHWASH) 1:1:1 suspension  Self No No   Sig: Swish and swallow 10 mL every 4 (four) hours as needed for mouth pain or discomfort   albuterol (PROVENTIL HFA,VENTOLIN HFA) 90 mcg/act inhaler  Self No No   Sig: Inhale 2 puffs every 6 (six) hours as needed for wheezing or shortness of breath   alendronate (FOSAMAX) 70 mg tablet  Self Yes No   Sig: Take by mouth every 7 days    apixaban (ELIQUIS) 5 mg  Self No No   Sig: Take 1 tablet (5 mg total) by mouth 2 (two) times a day   cholestyramine (QUESTRAN) 4 g packet   Yes Yes   Sig: Take 1 packet by mouth daily   cholestyramine sugar free (QUESTRAN LIGHT) 4 g packet  Self No No   Sig: Take 1 packet (4 g total) by mouth 2 (two) times a day   dextran 70-hypromellose (GENTEAL TEARS) 0 1-0 3 % ophthalmic solution  Self No No   Sig: Administer 1 drop to both eyes 3 (three) times a day   diltiazem (CARDIZEM CD) 240 mg 24 hr capsule  Self No No   Sig: Take 1 capsule (240 mg total) by mouth daily   doxycycline hyclate (VIBRA-TABS) 100 mg tablet   Yes Yes   Sig: Take 1 tablet by mouth 2 (two) times a day   fluticasone-umeclidinium-vilanterol (TRELEGY ELLIPTA) 100-62 5-25 MCG/INH inhaler  Self Yes No   Si puff daily   ipratropium-albuterol (DUO-NEB) 0 5-2 5 mg/3 mL nebulizer solution  Self No No   Sig: Take 1 vial (3 mL total) by nebulization every 6 (six) hours while awake   iron polysaccharides (FERREX) 150 mg capsule  Self No No   Sig: Take 1 capsule (150 mg total) by mouth 2 (two) times a day   loperamide (IMODIUM) 2 mg capsule  Self No No   Sig: Take 1 capsule (2 mg total) by mouth 3 (three) times a day as needed for diarrhea   methocarbamol (ROBAXIN) 750 mg tablet  Self Yes No   metoprolol tartrate (LOPRESSOR) 25 mg tablet  Self No No   Sig: Take 0 5 tablets (12 5 mg total) by mouth every 12 (twelve) hours   montelukast (SINGULAIR) 10 mg tablet  Self No No   Sig: Take 1 tablet (10 mg total) by mouth daily at bedtime   oxyCODONE-acetaminophen (PERCOCET) 7 5-325 MG per tablet   Yes Yes   Sig: Take 1 tablet by mouth every 6 (six) hours as needed every 6 to 8 hours if needed for pain   pantoprazole (PROTONIX) 40 mg tablet  Self No No   Sig: Take 1 tablet (40 mg total) by mouth daily in the early morning   potassium chloride (MICRO-K) 10 MEQ CR capsule  Self No No   Sig: Take 1 capsule (10 mEq total) by mouth daily   psyllium (METAMUCIL) packet  Self No No   Sig: Take 1 packet by mouth 3 (three) times a day   simethicone (MYLICON) 80 mg chewable tablet  Self No No   Sig: Chew 1 tablet (80 mg total) every 6 (six) hours as needed for flatulence   traMADol (ULTRAM) 50 mg tablet  Self Yes No Facility-Administered Medications: None     Allergies   Allergen Reactions    Bee Venom     Fluticasone        Objective   First Vitals:   Blood Pressure: 134/88 (03/17/20 1845)  Pulse: 90 (03/17/20 1845)  Temperature: 99 5 °F (37 5 °C) (03/17/20 1845)  Temp Source: Temporal (03/17/20 1845)  Respirations: (!) 24 (03/17/20 1845)  Height: 5' (152 4 cm) (03/17/20 1845)  Weight - Scale: 65 8 kg (145 lb) (03/17/20 1845)  SpO2: 95 % (03/17/20 1845)    Current Vitals:   Blood Pressure: 118/56 (03/18/20 1528)  Pulse: 103 (03/18/20 1528)  Temperature: 98 °F (36 7 °C) (03/18/20 1528)  Temp Source: Temporal (03/18/20 1528)  Respirations: 18 (03/18/20 1528)  Height: 5' (152 4 cm) (03/17/20 1845)  Weight - Scale: 65 8 kg (145 lb) (03/17/20 1845)  SpO2: 94 % (03/18/20 1528)      Intake/Output Summary (Last 24 hours) at 3/18/2020 1707  Last data filed at 3/17/2020 2033  Gross per 24 hour   Intake 500 ml   Output    Net 500 ml       Invasive Devices     Peripheral Intravenous Line            Peripheral IV 03/17/20 Right Antecubital less than 1 day          Drain            External Urinary Catheter 44 days                Physical Exam   Constitutional: She is oriented to person, place, and time  No distress  Frail elderly white female   Eyes: No scleral icterus  Neck: Neck supple  Cardiovascular:   No murmur heard  Pulmonary/Chest:   Distant breath sounds   Abdominal: Soft  She exhibits distension  She exhibits no mass  Tenderness: Minimal tenderness  There is no guarding  Hypoactive bowel sounds   Genitourinary:   Genitourinary Comments: Deferred   Neurological: She is alert and oriented to person, place, and time  Skin: Skin is warm and dry  Psychiatric: She has a normal mood and affect         Lab Results:   CBC:   Lab Results   Component Value Date    WBC 8 50 03/18/2020    HGB 11 1 (L) 03/18/2020    HCT 35 0 (L) 03/18/2020    MCV 82 03/18/2020     03/18/2020    MCH 25 7 (L) 03/18/2020    MCHC 31 6 03/18/2020    RDW 16 7 (H) 03/18/2020    MPV 7 1 (L) 03/18/2020   , CMP:   Lab Results   Component Value Date    SODIUM 134 03/18/2020    K 3 3 (L) 03/18/2020    CL 98 03/18/2020    CO2 29 03/18/2020    BUN 6 (L) 03/18/2020    CREATININE 0 39 (L) 03/18/2020    CALCIUM 8 1 (L) 03/18/2020    AST 88 (H) 03/18/2020    ALT 81 (H) 03/18/2020    ALKPHOS 363 (H) 03/18/2020    EGFR 98 03/18/2020   , Coagulation: No results found for: PT, INR, APTT, Urinalysis: No results found for: COLORU, CLARITYU, SPECGRAV, PHUR, LEUKOCYTESUR, NITRITE, PROTEINUA, GLUCOSEU, KETONESU, BILIRUBINUR, BLOODU, Lipase:   Lab Results   Component Value Date    LIPASE <10 (L) 03/17/2020     Imaging: I have personally reviewed pertinent reports  and I have personally reviewed pertinent films in PACS  EKG, Pathology, and Other Studies: I have personally reviewed pertinent reports  Counseling / Coordination of Care  Total floor / unit time spent today 20 minutes  Greater than 50% of total time was spent with the patient and / or family counseling and / or coordination of care  A description of the counseling / coordination of care: Including discussion with the primary service, and ordering Gastrografin upper GI with small bowel series for tomorrow

## 2020-03-18 NOTE — ED NOTES
Pt requesting "breathing treatment that I normally take at home " Dr Nichelle Plata made aware of patient's request for same (Duoneb)     Jarod Bertrand, TATUM  03/17/20 2051

## 2020-03-18 NOTE — ED NOTES
Pt placed on bedpan to have BM  Pt states she has been having diarrhea all weekend and Dr Natalie Smallwood told her to take imodium every 3 hours   Pt states this is not helping and she continues with diarrhea  Pt feels there is something wrong with her abdomen "and they keep sending me home " Abdomen bloated per patient and "I can't keep my food down  It always feels like something is coming back up in my throat " Pt states if she would need that tube put in her nose and down into her stomach "I am not having that done again! That was awful!  They can find some other way to fix the problem!"     Obdulio Turner, RN  03/17/20 2038

## 2020-03-18 NOTE — RESPIRATORY THERAPY NOTE
RT Protocol Note  Sukumar Marie 82 y o  female MRN: [id number]  Unit/Bed#: -01 Encounter: [id number]    Assessment    Active Problems:    * No active hospital problems  *      Home Pulmonary Medications:    Home Devices/Therapy: (P) Home O2, Other (Comment)(Albuterol MDI/Trelegy/Symbicort)    Past Medical History:   Diagnosis Date    Asthma     Atrial fibrillation     Blurred vision     Cardiac disease     Colitis     COPD (chronic obstructive pulmonary disease)     Diverticulosis     DJD (degenerative joint disease)     Emphysema lung     Gait abnormality      Social History     Socioeconomic History    Marital status:       Spouse name: None    Number of children: None    Years of education: None    Highest education level: None   Occupational History    None   Social Needs    Financial resource strain: None    Food insecurity:     Worry: None     Inability: None    Transportation needs:     Medical: None     Non-medical: None   Tobacco Use    Smoking status: Former Smoker     Last attempt to quit: 2013     Years since quittin 3    Smokeless tobacco: Never Used   Substance and Sexual Activity    Alcohol use: Not Currently     Frequency: Never     Binge frequency: Never    Drug use: Not Currently    Sexual activity: Not Currently   Lifestyle    Physical activity:     Days per week: None     Minutes per session: None    Stress: None   Relationships    Social connections:     Talks on phone: None     Gets together: None     Attends Church service: None     Active member of club or organization: None     Attends meetings of clubs or organizations: None     Relationship status: None    Intimate partner violence:     Fear of current or ex partner: None     Emotionally abused: None     Physically abused: None     Forced sexual activity: None   Other Topics Concern    None   Social History Narrative    None       Subjective Home bronchodialator pathway-Tx's previously ordered by [facility]  Objective    Physical Exam:   Assessment Type: (P) Assess only  General Appearance: (P) Drowsy  Respiratory Pattern: (P) Normal  Chest Assessment: (P) Chest expansion symmetrical  Bilateral Breath Sounds: (P) Clear, Diminished    Vitals:  Blood pressure 122/70, pulse 92, temperature 99 5 °F (37 5 °C), temperature source Temporal, resp  rate 18, height 5' (1 524 m), weight 65 8 kg (145 lb), SpO2 96 %, not currently breastfeeding  Imaging and other studies: I have personally reviewed pertinent reports              Plan    Respiratory Plan: (P) Home Bronchodilator Patient pathway        Resp Comments: (P) pt  was found on 2lpm

## 2020-03-18 NOTE — TELEPHONE ENCOUNTER
PT's close friend called in to cancel appt  Due to PT being in the ER currently  PT will call back to reschedule for another appointment

## 2020-03-18 NOTE — SOCIAL WORK
Evaluated the pt at the bedside  Pt was admitted to the hospital for diarrhea  Explained the role of CM and the options of discharge planning with the pt  Pt lives alone in a mobile home with 3 SAVANNAH  Pt states she had recent abdominal surgery and goes to Dr Román Beasley every Wed for a wound check  Pt states she is independent with ADL's  She has a neighbor who gets her groceries anmd cooks her meals  She will reheat meals that were made  Pt takes her own medications and does her own laundry  Pt states she is active with [facility] with RN and PT  Pt states she is oxygen dependent at home at 2L/min with her oxygen supplier being [address]  Pt brought her portable oxygen with her  Will await the PT/OT evaluation to determine she can go back with [facility] services  Pt continues to c/o nausea and diarrhea  Pt may need transport home upon discharge  Patient/caregiver received discharge checklist   Content reviewed  Patient/caregiver encouraged to participate in discharge plan of care prior to discharge home  CM reviewed d/c planning process including the following: identifying help at home, patient preference for d/c planning needs, availability of treatment team to discuss questions or concerns patient and/or family may have regarding understanding medications and recognizing signs and symptoms once discharged  CM also encouraged patient to follow up with all recommended appointments after discharge  Patient advised of importance for patient and family to participate in managing patients medical well being

## 2020-03-18 NOTE — ED NOTES
Pt incontinent of moderate amount liquid light brown BM on underpad  Placed on bedpan to finish moving bowels  Cleansed for same thereafter  Underpad changed and pt assisted into position of comfort       Tegan Lopez RN  03/17/20 2050

## 2020-03-18 NOTE — ED NOTES
Shortly after arrival to ER 10 patient vomited undigested food material and her PM pills she took prior to arrival in this ER  Pt cleansed for same and new gown and bed linens applied       Palomo Leon RN  03/17/20 2040

## 2020-03-18 NOTE — H&P
Shwetha,#  SSS:[birth year] F  XNP:[id number]    IYJ:[id number]  Adm Date: 3/17/2020 1842  6:42 PM   ATT PHY: Suzette Mcconnell         Chief Complaint:  Dysphagia/Odynophagia along with nausea vomitings, abdominal pain and diarrhea episodes    History of Presenting Illness: Juanito Phillips is a(n) 82y o  year old female who was admitted through the emergency department where she presented with the symptoms above  Patient had history of recent exploratory laparotomy for gastric outlet obstruction  Patient received enterectomy along with Billroth II gastrojejunostomy 3 weeks ago  Patient was admitted to medical-surgical floor and Dr Stephon Darby general surgeon was consulted along with Dr Donita Pineda gastroenterologist     Patient examined at bedside  Patient reports that she had at least 4 episodes of vomitings along with multiple diarrhea as yesterday  Patient feels that the food gets stuck behind her neck  Patient is NPO since admission  As per the caring staff nurse patient had as an episode of clear looking small vomitus this morning  As per the caring staff nurse patient continues to have nausea  Patient has received Zofran  Allergies   Allergen Reactions    Bee Venom     Fluticasone        No current facility-administered medications on file prior to encounter        Current Outpatient Medications on File Prior to Encounter   Medication Sig Dispense Refill    cholestyramine (QUESTRAN) 4 g packet Take 1 packet by mouth daily      doxycycline hyclate (VIBRA-TABS) 100 mg tablet Take 1 tablet by mouth 2 (two) times a day      oxyCODONE-acetaminophen (PERCOCET) 7 5-325 MG per tablet Take 1 tablet by mouth every 6 (six) hours as needed every 6 to 8 hours if needed for pain      acetaminophen (TYLENOL) 325 mg tablet Take 2 tablets (650 mg total) by mouth every 6 (six) hours as needed for mild pain, headaches or fever 30 tablet 0    al mag oxide-diphenhydramine-lidocaine viscous (MAGIC MOUTHWASH) 1:1:1 suspension Swish and swallow 10 mL every 4 (four) hours as needed for mouth pain or discomfort 1 Bottle 0    albuterol (PROVENTIL HFA,VENTOLIN HFA) 90 mcg/act inhaler Inhale 2 puffs every 6 (six) hours as needed for wheezing or shortness of breath 1 Inhaler 1    alendronate (FOSAMAX) 70 mg tablet Take by mouth every 7 days   0    ALPRAZolam (XANAX) 0 5 mg tablet Take 0 5 mg by mouth daily at bedtime as needed      apixaban (ELIQUIS) 5 mg Take 1 tablet (5 mg total) by mouth 2 (two) times a day 60 tablet 0    Artificial Tear Solution (GENTEAL TEARS) 0 1-0 2-0 3 % SOLN Administer 1 drop to both eyes 3 (three) times a day      Calcium Carb-Cholecalciferol (CALCIUM 1000 + D PO) Take 1,000 mg by mouth daily      cholestyramine sugar free (QUESTRAN LIGHT) 4 g packet Take 1 packet (4 g total) by mouth 2 (two) times a day 60 tablet 1    dextran 70-hypromellose (GENTEAL TEARS) 0 1-0 3 % ophthalmic solution Administer 1 drop to both eyes 3 (three) times a day 1 Bottle 0    diltiazem (CARDIZEM CD) 240 mg 24 hr capsule Take 1 capsule (240 mg total) by mouth daily 90 capsule 0    fluticasone-umeclidinium-vilanterol (TRELEGY ELLIPTA) 100-62 5-25 MCG/INH inhaler 1 puff daily      HYDROcodone-acetaminophen (NORCO) 5-325 mg per tablet       ipratropium-albuterol (DUO-NEB) 0 5-2 5 mg/3 mL nebulizer solution Take 1 vial (3 mL total) by nebulization every 6 (six) hours while awake 300 mL 0    iron polysaccharides (FERREX) 150 mg capsule Take 1 capsule (150 mg total) by mouth 2 (two) times a day 60 capsule 1    loperamide (IMODIUM) 2 mg capsule Take 1 capsule (2 mg total) by mouth 3 (three) times a day as needed for diarrhea 30 capsule 1    methocarbamol (ROBAXIN) 750 mg tablet       metoprolol tartrate (LOPRESSOR) 25 mg tablet Take 0 5 tablets (12 5 mg total) by mouth every 12 (twelve) hours 90 tablet 0    montelukast (SINGULAIR) 10 mg tablet Take 1 tablet (10 mg total) by mouth daily at bedtime 30 tablet 1    pantoprazole (PROTONIX) 40 mg tablet Take 1 tablet (40 mg total) by mouth daily in the early morning 30 tablet 1    potassium chloride (MICRO-K) 10 MEQ CR capsule Take 1 capsule (10 mEq total) by mouth daily 30 capsule 1    psyllium (METAMUCIL) packet Take 1 packet by mouth 3 (three) times a day 100 packet 1    simethicone (MYLICON) 80 mg chewable tablet Chew 1 tablet (80 mg total) every 6 (six) hours as needed for flatulence 30 tablet 0    traMADol (ULTRAM) 50 mg tablet          Active Ambulatory Problems     Diagnosis Date Noted    [facility] 12/28/2018    Cardiac disease 12/28/2018    Diverticulosis of intestine with bleeding 12/28/2018    Diarrhea 12/29/2018    Colorectal polyps 01/21/2019    Effusion of bursa of left elbow 09/23/2019    Cardiomegaly 01/23/2018    Chronic anxiety 01/23/2018    Chronic cystitis 12/11/2013    Chronic low back pain 08/13/2018    Congestive heart failure ([facility] ) 01/23/2018    Deviated nasal septum 10/20/2017    Diverticulosis of colon 01/21/2019    Gastroesophageal reflux disease without esophagitis 03/28/2018    Gout 07/20/2016    History of angioedema 11/18/2017    History of colonic polyps 01/21/2019    Lumbar radiculopathy 08/13/2018    Macular degeneration of both eyes 04/18/2019    Obesity 02/28/2018    Osteoporosis 03/08/2018    Other specified forms of chronic ischemic heart disease 12/11/2013    Seasonal allergies 05/02/2018    Panic attack 01/23/2018    Vocal cord dysfunction 10/20/2017    Colitis presumed infectious 10/03/2019    Atrial fibrillation with rapid ventricular response ([facility] ) 10/06/2019    Left elbow pain 10/06/2019    Chronic respiratory failure with hypoxia ([facility] ) 10/07/2019    Debility 10/11/2019    Positive culture findings in sputum 10/12/2019    Compression fracture of L4 vertebra ([facility] ) 01/23/2020    Gastric outlet obstruction 2020    Closed wedge compression fracture of T12 vertebra (HCC) 2020    Wound dehiscence, surgical, subsequent encounter 2020     Resolved Ambulatory Problems     Diagnosis Date Noted    Rectal bleed 2018    Asthma 2018    Herpes zoster 2012    Necrotizing fasciitis ([facility] ) 2020     Past Medical History:   Diagnosis Date    Atrial fibrillation     Blurred vision     Colitis     Diverticulosis     DJD (degenerative joint disease)     Emphysema lung     Gait abnormality        Past Surgical History:   Procedure Laterality Date    BLADDER SURGERY      COLON SURGERY      COLONOSCOPY W/ POLYPECTOMY N/A 2019    Procedure: COLONOSCOPY W/ POLYPECTOMY;  Surgeon: Gary Starks MD;  Location: [address] [place] [address] GI LAB; Service: General    GASTROJEJUNOSTOMY W/ JEJUNOSTOMY TUBE N/A 2/3/2020    Procedure: Antrectomy with bilroth II Anastomosis; Surgeon: Caleb Buckley MD;  Location:  MAIN OR;  Service: General    SMALL INTESTINE SURGERY         Social History:   Social History     Socioeconomic History    Marital status:       Spouse name: None    Number of children: None    Years of education: None    Highest education level: None   Occupational History    None   Social Needs    Financial resource strain: None    Food insecurity:     Worry: None     Inability: None    Transportation needs:     Medical: None     Non-medical: None   Tobacco Use    Smoking status: Former Smoker     Last attempt to quit: 2013     Years since quittin 3    Smokeless tobacco: Never Used   Substance and Sexual Activity    Alcohol use: Not Currently     Frequency: Never     Binge frequency: Never    Drug use: Not Currently    Sexual activity: Not Currently   Lifestyle    Physical activity:     Days per week: None     Minutes per session: None    Stress: None   Relationships    Social connections:     Talks on phone: None     Gets together: None     Attends Holiness service: None     Active member of club or organization: None     Attends meetings of clubs or organizations: None     Relationship status: None    Intimate partner violence:     Fear of current or ex partner: None     Emotionally abused: None     Physically abused: None     Forced sexual activity: None   Other Topics Concern    None   Social History Narrative    None       Family History:   Family History   Problem Relation Age of Onset    Heart disease Mother        Review of Systems   Gastrointestinal: Positive for nausea and vomiting  Negative for diarrhea  Musculoskeletal: Positive for arthralgias, back pain and gait problem  Skin: Positive for wound  Neurological: Positive for weakness  All other systems reviewed and are negative  Physical Exam   Constitutional: Awake and Alert  Well-developed and well-nourished  No distress  HENT: PERR,EOMI, conjunctiva normal  Head: Normocephalic and atraumatic  Mouth/Throat: Oropharynx is clear and moist     Eyes: Conjunctivae and EOM are normal  Pupils are equal, round, and reactive to light  Right and left eye exhibits no discharge  Neck: Neck supple  No tracheal deviation present  No thyromegaly present  Cardiovascular: Normal rate, regular rhythm and normal heart sounds  Exam reveals no friction rub  No murmur heard  Pulmonary/Chest: Effort normal and breath sounds normal  No respiratory distress  She has no wheezes  Abdominal: Soft  Bowel sounds are normal  She exhibits no distension  There is no tenderness  There is no rebound and no guarding  Neurological: Cranial Nerves grossly intact  No sensory deficit  Coordination normal    Musculoskeletal:   Nontender spine  Skin: Skin is warm and dry  No rash noted  No diaphoresis  No erythema  No edema  No cyanosis  Andrew Ferguson is a(n) 82y o  year old female with Dysphagia/Odynophagia along with Nausea, Vomiting and diarrhea episodes     1  Dysphagia/Odynophagia along with Nausea, Vomiting and diarrhea episodes with recent Antrectomy and Billroth 2 Gastrojejunostomy 3 weeks ago for gastric outlet obstruction  Dr Marcelina Galan general surgeon is on consult  He will get Gastrografin study in the morning  Patient will be kept NPO for now  Patient may be started on clear liquids after Gastrografin study  Dr [facility] has seen the patient earlier  He does not think patient needs EGD  He did put the patient on Diflucan for possible Candida esophagitis  Patient may get Zofran 4mg IV as needed for nausea  Patient is receiving Protonix 40 mg IV b i d    4  Cardiac with history of hypertension, atrial fibrillation   Holding Eliquis, diltiazem, Lasix metoprolol and potassium chloride  5  COPD   Continue Breo Ellipta, albuterol, and ipratropium nebs and oxygen  Holding Singulair  6  Osteoporosis   Holding alendronate along with calcium and vitamin-D   7  DJD/osteoarthritis   In view of NPO status  Patient may get Toradol 15 mg every 6 hours on as-needed basis for pain  8  Iron Deficiency Anemia  Holding Niferex  9  Dry eyes  Patient may get artificial tears on as-needed basis  10  Midline epigastric area surgical dehiscence wound  Wound dressing as per Dr Melisa Jiménez      Prognosis: Judie Randhawa      Discharge Plan: In progress      Advanced Directives: I have discussed in detail the patient the advanced directives   Patient has appointed one of her close friend Henrey Dubin her phone number is [phone]  First contact is listed as Deqi Dougherty who can be reached at [phone]  When discussing cardiac and pulmonary resuscitation efforts with the patient,wishes to be FULL CODE      I have spent more than 50 minutes gathering data, doing physical examination, and discussing the advanced directives, which was witnessed by caring staff

## 2020-03-19 ENCOUNTER — APPOINTMENT (INPATIENT)
Dept: RADIOLOGY | Facility: HOSPITAL | Age: 85
DRG: 382 | End: 2020-03-19
Payer: MEDICARE

## 2020-03-19 LAB
MRSA NOSE QL CULT: ABNORMAL
MRSA NOSE QL CULT: ABNORMAL

## 2020-03-19 PROCEDURE — C9113 INJ PANTOPRAZOLE SODIUM, VIA: HCPCS | Performed by: PHYSICIAN ASSISTANT

## 2020-03-19 PROCEDURE — 0DC68ZZ EXTIRPATION OF MATTER FROM STOMACH, VIA NATURAL OR ARTIFICIAL OPENING ENDOSCOPIC: ICD-10-PCS | Performed by: INTERNAL MEDICINE

## 2020-03-19 PROCEDURE — 74248 X-RAY SM INT F-THRU STD: CPT

## 2020-03-19 PROCEDURE — 74240 X-RAY XM UPR GI TRC 1CNTRST: CPT

## 2020-03-19 PROCEDURE — 99024 POSTOP FOLLOW-UP VISIT: CPT | Performed by: SPECIALIST

## 2020-03-19 PROCEDURE — 94640 AIRWAY INHALATION TREATMENT: CPT

## 2020-03-19 PROCEDURE — 94760 N-INVAS EAR/PLS OXIMETRY 1: CPT

## 2020-03-19 PROCEDURE — 0D7A8ZZ DILATION OF JEJUNUM, VIA NATURAL OR ARTIFICIAL OPENING ENDOSCOPIC: ICD-10-PCS | Performed by: INTERNAL MEDICINE

## 2020-03-19 PROCEDURE — 0D768ZZ DILATION OF STOMACH, VIA NATURAL OR ARTIFICIAL OPENING ENDOSCOPIC: ICD-10-PCS | Performed by: INTERNAL MEDICINE

## 2020-03-19 RX ORDER — LORAZEPAM 2 MG/ML
0.5 INJECTION INTRAMUSCULAR ONCE
Status: COMPLETED | OUTPATIENT
Start: 2020-03-19 | End: 2020-03-20

## 2020-03-19 RX ORDER — ZOLPIDEM TARTRATE 5 MG/1
5 TABLET ORAL
Status: DISCONTINUED | OUTPATIENT
Start: 2020-03-19 | End: 2020-03-19

## 2020-03-19 RX ORDER — DEXTROSE AND SODIUM CHLORIDE 5; .45 G/100ML; G/100ML
100 INJECTION, SOLUTION INTRAVENOUS CONTINUOUS
Status: DISCONTINUED | OUTPATIENT
Start: 2020-03-19 | End: 2020-03-22 | Stop reason: HOSPADM

## 2020-03-19 RX ORDER — ZOLPIDEM TARTRATE 5 MG/1
5 TABLET ORAL
Status: DISCONTINUED | OUTPATIENT
Start: 2020-03-19 | End: 2020-03-22 | Stop reason: HOSPADM

## 2020-03-19 RX ORDER — FLUCONAZOLE 2 MG/ML
200 INJECTION, SOLUTION INTRAVENOUS EVERY 24 HOURS
Status: DISCONTINUED | OUTPATIENT
Start: 2020-03-20 | End: 2020-03-20

## 2020-03-19 RX ADMIN — FLUCONAZOLE 200 MG: 100 TABLET ORAL at 10:22

## 2020-03-19 RX ADMIN — BUDESONIDE AND FORMOTEROL FUMARATE DIHYDRATE 2 PUFF: 160; 4.5 AEROSOL RESPIRATORY (INHALATION) at 17:50

## 2020-03-19 RX ADMIN — ENOXAPARIN SODIUM 40 MG: 40 INJECTION SUBCUTANEOUS at 10:22

## 2020-03-19 RX ADMIN — Medication 1 APPLICATION: at 10:33

## 2020-03-19 RX ADMIN — IOHEXOL 100 ML: 300 INJECTION, SOLUTION INTRAVENOUS at 10:10

## 2020-03-19 RX ADMIN — MUPIROCIN 1 APPLICATION: 20 OINTMENT TOPICAL at 13:00

## 2020-03-19 RX ADMIN — METOCLOPRAMIDE 10 MG: 5 INJECTION, SOLUTION INTRAMUSCULAR; INTRAVENOUS at 17:53

## 2020-03-19 RX ADMIN — METOCLOPRAMIDE 10 MG: 5 INJECTION, SOLUTION INTRAMUSCULAR; INTRAVENOUS at 00:32

## 2020-03-19 RX ADMIN — MUPIROCIN 1 APPLICATION: 20 OINTMENT TOPICAL at 21:13

## 2020-03-19 RX ADMIN — IPRATROPIUM BROMIDE AND ALBUTEROL SULFATE 3 ML: 2.5; .5 SOLUTION RESPIRATORY (INHALATION) at 15:33

## 2020-03-19 RX ADMIN — IPRATROPIUM BROMIDE AND ALBUTEROL SULFATE 3 ML: 2.5; .5 SOLUTION RESPIRATORY (INHALATION) at 19:56

## 2020-03-19 RX ADMIN — IPRATROPIUM BROMIDE AND ALBUTEROL SULFATE 3 ML: 2.5; .5 SOLUTION RESPIRATORY (INHALATION) at 07:50

## 2020-03-19 RX ADMIN — KETOROLAC TROMETHAMINE 15 MG: 30 INJECTION, SOLUTION INTRAMUSCULAR at 11:48

## 2020-03-19 RX ADMIN — Medication: at 13:23

## 2020-03-19 RX ADMIN — SODIUM CHLORIDE 125 ML/HR: 0.9 INJECTION, SOLUTION INTRAVENOUS at 00:32

## 2020-03-19 RX ADMIN — Medication 1 APPLICATION: at 22:19

## 2020-03-19 RX ADMIN — DEXTROSE AND SODIUM CHLORIDE 125 ML/HR: 5; 450 INJECTION, SOLUTION INTRAVENOUS at 15:45

## 2020-03-19 RX ADMIN — BUDESONIDE AND FORMOTEROL FUMARATE DIHYDRATE 2 PUFF: 160; 4.5 AEROSOL RESPIRATORY (INHALATION) at 10:23

## 2020-03-19 RX ADMIN — METOCLOPRAMIDE 10 MG: 5 INJECTION, SOLUTION INTRAMUSCULAR; INTRAVENOUS at 05:00

## 2020-03-19 RX ADMIN — KETOROLAC TROMETHAMINE 15 MG: 30 INJECTION, SOLUTION INTRAMUSCULAR at 17:51

## 2020-03-19 RX ADMIN — PANTOPRAZOLE SODIUM 40 MG: 40 INJECTION, POWDER, FOR SOLUTION INTRAVENOUS at 10:22

## 2020-03-19 RX ADMIN — PANTOPRAZOLE SODIUM 40 MG: 40 INJECTION, POWDER, FOR SOLUTION INTRAVENOUS at 21:12

## 2020-03-19 RX ADMIN — METOCLOPRAMIDE 10 MG: 5 INJECTION, SOLUTION INTRAMUSCULAR; INTRAVENOUS at 13:25

## 2020-03-19 NOTE — PROGRESS NOTES
Progress Note - Ania Peña 82 y o  female MRN: [id number]    Unit/Bed#: -01 Encounter: [id number]        Subjective:   Patient seen and examined at bedside after reviewing the chart and discussing the case with the caring staff  Patient examined at bedside  Patient reports that she continues to have back pain but denies any vomiting episodes but continues to feel nausea off and on  Patient received Gastrografin study today  Patient is still NPO  Patient's nasal swab came back positive for MRSA  Dr Noah Estrada call me over the phone and informed me that he is planning to do EGD tomorrow morning  He wants the patient to stay NPO  He advice Diflucan to be changed to IV  Patient is receiving D5 half normal saline at 125 cc an hour  Physical Exam   Vitals: Blood pressure 112/70, pulse 96, temperature 97 9 °F (36 6 °C), temperature source Temporal, resp  rate 18, height 5' (1 524 m), weight 65 8 kg (145 lb), SpO2 92 %, not currently breastfeeding  ,Body mass index is 28 32 kg/m²  Constitutional: He appears well-developed  HEENT: PERR, EOMI, MMM  Cardiovascular: Normal rate and regular rhythm  Pulmonary/Chest: Effort normal and breath sounds normal    Abdomen: Soft, + BS, NT    Assessment/Plan:     Grace Tadeo is a(n) 82 y  o  year old female with Dysphagia/Odynophagia along with Nausea, Vomiting and diarrhea episodes     1  Dysphagia/Odynophagia along with Nausea, Vomiting and diarrhea episodes with recent Antrectomy and Billroth 2 Gastrojejunostomy 3 weeks ago for gastric outlet obstruction    Patient is NPO and is receiving D5 half normal saline at 1:20 a m  5 cc an hour  Symptomatic Adela patient has improved  Dr Keya Hopkins general surgeon is on consult  Patient has received Gastrografin study today but results are still pending  Dr Yesica Campbell GI is planning to do EGD tomorrow morning 03/20/2020      patient is on Diflucan 200 mg IV daily for possible Candida esophagitis  Patient may get Zofran 4mg IV as needed for nausea  Patient is receiving Protonix 40 mg IV b i d    4  Cardiac with history of hypertension, atrial fibrillation   Holding Eliquis, diltiazem, Lasix metoprolol and potassium chloride  5  COPD with history of chronic respiratory failure with hypoxemia  Patient is on home oxygenation via nasal cannula with respiratory protocol  Continue Breo Ellipta, albuterol, and ipratropium nebs and oxygen  Holding Singulair  6  Osteoporosis   Holding alendronate along with calcium and vitamin-D   7  DJD/osteoarthritis   In view of NPO status  Patient may get Toradol 15 mg every 6 hours on as-needed basis for pain  8  Iron Deficiency Anemia  Holding Niferex  9  Dry eyes  Patient may get artificial tears on as-needed basis  10  Midline epigastric area surgical dehiscence wound  Wound dressing as per Dr Judy Mariscal  11  MRSA positive nares  I will treat the patient with mupirocin nasal 2 times daily for 5 days

## 2020-03-19 NOTE — NURSING NOTE
Pt had small hard bowel movement that way gray/caitlyn in color  Pt had not had any loose stools during the day

## 2020-03-19 NOTE — PROGRESS NOTES
Progress Note - General Surgery   Duarte Jose 82 y o  female MRN: [id number]  Unit/Bed#: -01 Encounter: [id number]    Assessment:  Pt comfortable, still with globus sensation, no nausea or vomiting  VSS AF  Gastrografin study demonstrates dilated food filled stomach, with trickle of gastrografin emptying through the anastamosis  Plan:  Discussed with GI, plan for endoscopy tomorrow, with possible balloon dilatation of the gastrojejunostomy  Subjective/Objective   Chief Complaint: Abdominal Bloating    Subjective: appears alert and comfortable    Objective:     Blood pressure 110/64, pulse 89, temperature (!) 96 9 °F (36 1 °C), temperature source Temporal, resp  rate 18, height 5' (1 524 m), weight 65 8 kg (145 lb), SpO2 98 %, not currently breastfeeding  ,Body mass index is 28 32 kg/m²        Intake/Output Summary (Last 24 hours) at 3/19/2020 1636  Last data filed at 3/19/2020 1545  Gross per 24 hour   Intake 400 ml   Output    Net 400 ml       Invasive Devices     Peripheral Intravenous Line            Peripheral IV 03/19/20 Right;Ventral (anterior) Hand less than 1 day          Drain            External Urinary Catheter 45 days                Physical Exam:   Comfortable on room air  BS clear, distant bilaterally  Abd:  Distended, nontender, tympanic in LUQ to percussion    Lab, Imaging and other studies:CBC: No results found for: WBC, HGB, HCT, MCV, PLT, ADJUSTEDWBC, MCH, MCHC, RDW, MPV, NRBC, CMP: No results found for: SODIUM, K, CL, CO2, ANIONGAP, BUN, CREATININE, GLUCOSE, CALCIUM, AST, ALT, ALKPHOS, PROT, BILITOT, EGFR     VTE Pharmacologic Prophylaxis: Enoxaparin (Lovenox)  VTE Mechanical Prophylaxis: sequential compression device

## 2020-03-19 NOTE — SOCIAL WORK
Spoke to  The pt at the bedside who reports she is having further studies as she feels food getting "stuck in her chest"  Pt states she does not need any STR she is walking fine  Pt states she will go home and resume [facility]  Pt has her portable oxygen here

## 2020-03-20 ENCOUNTER — ANESTHESIA (INPATIENT)
Dept: PERIOP | Facility: HOSPITAL | Age: 85
DRG: 382 | End: 2020-03-20
Payer: MEDICARE

## 2020-03-20 ENCOUNTER — ANESTHESIA EVENT (INPATIENT)
Dept: PERIOP | Facility: HOSPITAL | Age: 85
DRG: 382 | End: 2020-03-20
Payer: MEDICARE

## 2020-03-20 ENCOUNTER — APPOINTMENT (INPATIENT)
Dept: PERIOP | Facility: HOSPITAL | Age: 85
DRG: 382 | End: 2020-03-20
Attending: INTERNAL MEDICINE
Payer: MEDICARE

## 2020-03-20 PROCEDURE — 94760 N-INVAS EAR/PLS OXIMETRY 1: CPT

## 2020-03-20 PROCEDURE — 99024 POSTOP FOLLOW-UP VISIT: CPT | Performed by: SPECIALIST

## 2020-03-20 PROCEDURE — 94640 AIRWAY INHALATION TREATMENT: CPT

## 2020-03-20 PROCEDURE — C9113 INJ PANTOPRAZOLE SODIUM, VIA: HCPCS | Performed by: PHYSICIAN ASSISTANT

## 2020-03-20 PROCEDURE — C1726 CATH, BAL DIL, NON-VASCULAR: HCPCS

## 2020-03-20 RX ORDER — SUCCINYLCHOLINE/SOD CL,ISO/PF 100 MG/5ML
SYRINGE (ML) INTRAVENOUS AS NEEDED
Status: DISCONTINUED | OUTPATIENT
Start: 2020-03-20 | End: 2020-03-20 | Stop reason: SURG

## 2020-03-20 RX ORDER — LIDOCAINE HYDROCHLORIDE 20 MG/ML
INJECTION, SOLUTION EPIDURAL; INFILTRATION; INTRACAUDAL; PERINEURAL AS NEEDED
Status: DISCONTINUED | OUTPATIENT
Start: 2020-03-20 | End: 2020-03-20 | Stop reason: SURG

## 2020-03-20 RX ORDER — ESMOLOL HYDROCHLORIDE 10 MG/ML
INJECTION INTRAVENOUS AS NEEDED
Status: DISCONTINUED | OUTPATIENT
Start: 2020-03-20 | End: 2020-03-20 | Stop reason: SURG

## 2020-03-20 RX ORDER — PROPOFOL 10 MG/ML
INJECTION, EMULSION INTRAVENOUS AS NEEDED
Status: DISCONTINUED | OUTPATIENT
Start: 2020-03-20 | End: 2020-03-20 | Stop reason: SURG

## 2020-03-20 RX ADMIN — METOCLOPRAMIDE 10 MG: 5 INJECTION, SOLUTION INTRAMUSCULAR; INTRAVENOUS at 05:53

## 2020-03-20 RX ADMIN — PHENYLEPHRINE HYDROCHLORIDE 200 MCG: 10 INJECTION INTRAVENOUS at 11:18

## 2020-03-20 RX ADMIN — IPRATROPIUM BROMIDE AND ALBUTEROL SULFATE 3 ML: 2.5; .5 SOLUTION RESPIRATORY (INHALATION) at 21:01

## 2020-03-20 RX ADMIN — BUDESONIDE AND FORMOTEROL FUMARATE DIHYDRATE 2 PUFF: 160; 4.5 AEROSOL RESPIRATORY (INHALATION) at 08:04

## 2020-03-20 RX ADMIN — DEXTROSE AND SODIUM CHLORIDE 125 ML/HR: 5; 450 INJECTION, SOLUTION INTRAVENOUS at 00:09

## 2020-03-20 RX ADMIN — MUPIROCIN 1 APPLICATION: 20 OINTMENT TOPICAL at 08:04

## 2020-03-20 RX ADMIN — ENOXAPARIN SODIUM 40 MG: 40 INJECTION SUBCUTANEOUS at 08:08

## 2020-03-20 RX ADMIN — ZOLPIDEM TARTRATE 5 MG: 5 TABLET, FILM COATED ORAL at 21:22

## 2020-03-20 RX ADMIN — KETOROLAC TROMETHAMINE 15 MG: 30 INJECTION, SOLUTION INTRAMUSCULAR at 05:59

## 2020-03-20 RX ADMIN — PANTOPRAZOLE SODIUM 40 MG: 40 INJECTION, POWDER, FOR SOLUTION INTRAVENOUS at 08:08

## 2020-03-20 RX ADMIN — LIDOCAINE HYDROCHLORIDE 100 MG: 20 INJECTION, SOLUTION EPIDURAL; INFILTRATION; INTRACAUDAL; PERINEURAL at 11:18

## 2020-03-20 RX ADMIN — PROPOFOL 100 MG: 10 INJECTION, EMULSION INTRAVENOUS at 11:18

## 2020-03-20 RX ADMIN — METOCLOPRAMIDE 10 MG: 5 INJECTION, SOLUTION INTRAMUSCULAR; INTRAVENOUS at 18:04

## 2020-03-20 RX ADMIN — IPRATROPIUM BROMIDE AND ALBUTEROL SULFATE 3 ML: 2.5; .5 SOLUTION RESPIRATORY (INHALATION) at 08:49

## 2020-03-20 RX ADMIN — MUPIROCIN 1 APPLICATION: 20 OINTMENT TOPICAL at 21:20

## 2020-03-20 RX ADMIN — METOCLOPRAMIDE 10 MG: 5 INJECTION, SOLUTION INTRAMUSCULAR; INTRAVENOUS at 00:02

## 2020-03-20 RX ADMIN — Medication: at 08:17

## 2020-03-20 RX ADMIN — ESMOLOL HYDROCHLORIDE 30 MG: 100 INJECTION, SOLUTION INTRAVENOUS at 11:40

## 2020-03-20 RX ADMIN — KETOROLAC TROMETHAMINE 15 MG: 30 INJECTION, SOLUTION INTRAMUSCULAR at 22:08

## 2020-03-20 RX ADMIN — LORAZEPAM 0.5 MG: 2 INJECTION, SOLUTION INTRAMUSCULAR; INTRAVENOUS at 00:02

## 2020-03-20 RX ADMIN — DEXTROSE AND SODIUM CHLORIDE 125 ML/HR: 5; 450 INJECTION, SOLUTION INTRAVENOUS at 07:51

## 2020-03-20 RX ADMIN — DEXTROSE AND SODIUM CHLORIDE 125 ML/HR: 5; 450 INJECTION, SOLUTION INTRAVENOUS at 16:05

## 2020-03-20 RX ADMIN — IPRATROPIUM BROMIDE AND ALBUTEROL SULFATE 3 ML: 2.5; .5 SOLUTION RESPIRATORY (INHALATION) at 14:08

## 2020-03-20 RX ADMIN — KETOROLAC TROMETHAMINE 15 MG: 30 INJECTION, SOLUTION INTRAMUSCULAR at 15:58

## 2020-03-20 RX ADMIN — PANTOPRAZOLE SODIUM 40 MG: 40 INJECTION, POWDER, FOR SOLUTION INTRAVENOUS at 21:21

## 2020-03-20 RX ADMIN — Medication 100 MG: at 11:18

## 2020-03-20 RX ADMIN — BUDESONIDE AND FORMOTEROL FUMARATE DIHYDRATE 2 PUFF: 160; 4.5 AEROSOL RESPIRATORY (INHALATION) at 18:04

## 2020-03-20 RX ADMIN — FLUCONAZOLE IN SODIUM CHLORIDE 200 MG: 2 INJECTION, SOLUTION INTRAVENOUS at 08:02

## 2020-03-20 NOTE — ANESTHESIA PREPROCEDURE EVALUATION
Review of Systems/Medical History  Patient summary reviewed  Chart reviewed  No history of anesthetic complications     Cardiovascular  EKG reviewed, Exercise tolerance (METS): >4,  Dysrhythmias , atrial fibrillation, CHF , NYHA Classification: I compensated CHF,   Comment: TTE 10/2019:  LEFT VENTRICLE: Size was normal  Systolic function was normal  Ejection fraction was estimated to be 60 %  There were no regional wall motion abnormalities  Wall thickness was mildly increased  There was mild concentric hypertrophy  DOPPLER: Doppler parameters were consistent with abnormal left ventricular relaxation (grade 1 diastolic dysfunction)  RIGHT VENTRICLE: The size was normal  Systolic function was normal  Wall thickness was normal     LEFT ATRIUM: Size was normal     RIGHT ATRIUM: Size was normal     MITRAL VALVE: There was mild annular calcification  Valve structure was normal  There was normal leaflet separation  DOPPLER: The transmitral velocity was within the normal range  There was no evidence for stenosis  There was trace  regurgitation  AORTIC VALVE: The valve was not well visualized  DOPPLER: Transaortic velocity was within the normal range  There was no evidence for stenosis  There was mild regurgitation  TRICUSPID VALVE: The valve structure was normal  There was normal leaflet separation  DOPPLER: The transtricuspid velocity was within the normal range  There was no evidence for stenosis  There was trace regurgitation  Pulmonary artery  systolic pressure was moderately increased  Estimated peak PA pressure was 65 mmHg  The findings suggest moderate pulmonary hypertension  PULMONIC VALVE: Not well visualized  DOPPLER: The transpulmonic velocity was within the normal range  There was no significant regurgitation    ,  Pulmonary  Smoker ex-smoker  , COPD , Asthma ,        GI/Hepatic  Dysphagia,   GERD ,        Negative  ROS        Endo/Other  Negative endo/other ROS      GYN  Negative gynecology ROS          Hematology  Negative hematology ROS      Musculoskeletal  Back pain , thoracic pain and lumbar pain, Sciatica, Gout,   Arthritis     Neurology  Negative neurology ROS      Psychology   Anxiety,              Physical Exam    Airway    Mallampati score: I  TM Distance: >3 FB  Neck ROM: full     Dental   upper dentures and lower dentures,     Cardiovascular  Rhythm: irregular, Rate: normal,     Pulmonary  Pulmonary exam normal     Other Findings  On nasal cannula    Labs:   Results from last 7 days   Lab Units 03/18/20 0444 03/17/20 1943 03/17/20  1350 03/14/20  1847   WBC Thousand/uL 8 50 9 90 9 83 8 70   HEMOGLOBIN g/dL 11 1* 11 6* 11 5 10 9*   HEMATOCRIT % 35 0* 37 8* 38 9 34 8*   PLATELETS Thousands/uL 328 386 375 294   NEUTROS PCT % 75 82*  --  76*   MONOS PCT % 9 8  --  9     Results from last 7 days   Lab Units 03/18/20 0444 03/17/20 1943 03/17/20  1350 03/14/20  1917   SODIUM mmol/L 134 132* 135* 129*   POTASSIUM mmol/L 3 3* 3 5 4 2 3 7   CHLORIDE mmol/L 98 96* 99* 98   CO2 mmol/L 29 28 30 26   ANION GAP mmol/L 7 8 6 5   BUN mg/dL 6* 8  --  8   CREATININE mg/dL 0 39* 0 39*  --  0 37*   CALCIUM mg/dL 8 1* 8 6 8 7 8 1*   GLUCOSE RANDOM mg/dL 92 137*  --  129*   ALT U/L 81* 64*  --  35   AST U/L 88* 60*  --  54*   ALK PHOS U/L 363* 339*  --  211*   ALBUMIN g/dL 2 7* 3 1*  --  2 8*   TOTAL BILIRUBIN mg/dL 0 30 0 30  --  0 40     Results from last 7 days   Lab Units 03/17/20 1943   TROPONIN I ng/mL <0 03     Results from last 7 days   Lab Units 03/17/20 1943   LACTIC ACID mmol/L 1 5     Vitals:    03/20/20 0853   BP:    Pulse:    Resp:    Temp:    SpO2: 95%       Anesthesia Plan  ASA Score- 3     Anesthesia Type- general with ASA Monitors  Additional Monitors:   Airway Plan: ETT  Plan Factors-    Induction- intravenous and rapid sequence induction  Postoperative Plan- Plan for postoperative opioid use  Informed Consent- Anesthetic plan and risks discussed with patient    I No

## 2020-03-20 NOTE — PROGRESS NOTES
Progress Note - Adonay Chatman 82 y o  female MRN: [id number]    Unit/Bed#: -01 Encounter: [id number]        Subjective:   Patient seen and examined at bedside after reviewing the chart and discussing the case with the caring staff  Patient examined at bedside  Yesterday patient requesting something to sleep and for her anxiety  Patient received Ativan 0 5 mg IV as patient was NPO  Patient reports that she had a good night sleep  Patient continues to have mild nausea but no vomitings  Patient is going for EGD at 11:00 a m  Physical Exam   Vitals: Blood pressure 112/69, pulse 95, temperature (!) 97 2 °F (36 2 °C), resp  rate (!) 28, height 5' (1 524 m), weight 65 8 kg (145 lb), SpO2 100 %, not currently breastfeeding  ,Body mass index is 28 32 kg/m²  Constitutional: He appears well-developed  HEENT: PERR, EOMI, MMM  Cardiovascular: Normal rate and regular rhythm  Pulmonary/Chest: Effort normal and breath sounds normal    Abdomen: Soft, + BS, NT    Assessment/Plan:     Grace Tadeo is a(n) 82 y  o  year old female with Dysphagia/Odynophagia along with Nausea, Vomiting and diarrhea episodes     1  Dysphagia/Odynophagia along with Nausea, Vomiting and diarrhea episodes with recent Antrectomy and Billroth 2 Gastrojejunostomy 3 weeks ago for gastric outlet obstruction    Patient is receiving EGD today 03/20/2020 by Dr Margarita Bamberger GI  Patient is NPO and is receiving D5 half normal saline at 1:20 a m  5 cc an hour  Symptomaticaly patient has improved  Dr Ida Winters general surgeon is on consult  Patient has received Gastrografin study 03/19/2020, which showed gastric outlet obstruction  Official results are still pending  Patient is on Diflucan 200 mg IV daily for possible Candida esophagitis  Patient may get Zofran 4mg IV as needed for nausea  Patient is receiving Protonix 40 mg IV b i d    4  Cardiac with history of hypertension, atrial fibrillation   Holding Eliquis, diltiazem, Lasix metoprolol and potassium chloride  5  COPD with history of chronic respiratory failure with hypoxemia  Patient is on home oxygenation via nasal cannula with respiratory protocol  Continue Breo Ellipta, albuterol, and ipratropium nebs and oxygen  Holding Singulair  6  Osteoporosis   Holding alendronate along with calcium and vitamin-D   7  DJD/osteoarthritis   In view of NPO status  Patient may get Toradol 15 mg every 6 hours on as-needed basis for pain  8  Iron Deficiency Anemia  Holding Niferex  9  Dry eyes  Patient may get artificial tears on as-needed basis  10  Midline epigastric area surgical dehiscence wound  Wound dressing as per Dr Adriano Ramirez  11  MRSA positive nares  I will treat the patient with mupirocin nasal 2 times daily for 4 more days

## 2020-03-20 NOTE — SOCIAL WORK
Pt was scheduled for EGD today  Will go home when medically stable and resume services with [facility]  Pt has her portable oxygen here  Pt neighbor may transport or pt will need a ride

## 2020-03-20 NOTE — PROGRESS NOTES
Progress Note - General Surgery   Joe Barrios 82 y o  female MRN: [id number]  Unit/Bed#: -01 Encounter: [id number]    Assessment:  Patient is resting comfortably following EGD with balloon dilatation of gastrojejunal anastomosis  Currently alert, and tolerating clear liquids  To continue IV Protonix and IV Reglan  Minimal serous drainage tracking through on epigastric dressing  Plan:  Nutritional support with clear liquid supplements to start  DC Diflucan    Subjective/Objective   Chief Complaint:  Am I going to be out of the hospital by Monday?     Subjective: Appears comfortable    Objective:     Blood pressure 124/59, pulse 95, temperature (!) 97 2 °F (36 2 °C), resp  rate (!) 24, height 5' (1 524 m), weight 65 8 kg (145 lb), SpO2 97 %, not currently breastfeeding  ,Body mass index is 28 32 kg/m²  Intake/Output Summary (Last 24 hours) at 3/20/2020 1347  Last data filed at 3/20/2020 1145  Gross per 24 hour   Intake 2700 ml   Output 200 ml   Net 2500 ml       Invasive Devices     Peripheral Intravenous Line            Peripheral IV 03/19/20 Right;Ventral (anterior) Hand less than 1 day          Drain            External Urinary Catheter 46 days                Physical Exam:   Abdomen soft, epigastric dressing in place, with minimal serous strike through        Lab, Imaging and other studies:CBC: No results found for: WBC, HGB, HCT, MCV, PLT, ADJUSTEDWBC, MCH, MCHC, RDW, MPV, NRBC, CMP: No results found for: SODIUM, K, CL, CO2, ANIONGAP, BUN, CREATININE, GLUCOSE, CALCIUM, AST, ALT, ALKPHOS, PROT, BILITOT, EGFR  VTE Pharmacologic Prophylaxis: Enoxaparin (Lovenox)  VTE Mechanical Prophylaxis: sequential compression device

## 2020-03-20 NOTE — PLAN OF CARE
Problem: Potential for Falls  Goal: Patient will remain free of falls  Description  INTERVENTIONS:  - Assess patient frequently for physical needs  -  Identify cognitive and physical deficits and behaviors that affect risk of falls    -  [place] fall precautions as indicated by assessment   - Educate patient/family on patient safety including physical limitations  - Instruct patient to call for assistance with activity based on assessment  - Modify environment to reduce risk of injury  - Consider OT/PT consult to assist with strengthening/mobility  Outcome: Progressing     Problem: Prexisting or High Potential for Compromised Skin Integrity  Goal: Skin integrity is maintained or improved  Description  INTERVENTIONS:  - Identify patients at risk for skin breakdown  - Assess and monitor skin integrity  - Assess and monitor nutrition and hydration status  - Monitor labs   - Assess for incontinence   - Turn and reposition patient  - Assist with mobility/ambulation  - Relieve pressure over bony prominences  - Avoid friction and shearing  - Provide appropriate hygiene as needed including keeping skin clean and dry  - Evaluate need for skin moisturizer/barrier cream  - Collaborate with interdisciplinary team   - Patient/family teaching  - Consider wound care consult   Outcome: Progressing     Problem: PAIN - ADULT  Goal: Verbalizes/displays adequate comfort level or baseline comfort level  Description  Interventions:  - Encourage patient to monitor pain and request assistance  - Assess pain using appropriate pain scale  - Administer analgesics based on type and severity of pain and evaluate response  - Implement non-pharmacological measures as appropriate and evaluate response  - Consider cultural and social influences on pain and pain management  - Notify physician/advanced practitioner if interventions unsuccessful or patient reports new pain  Outcome: Progressing     Problem: INFECTION - ADULT  Goal: Absence or prevention of progression during hospitalization  Description  INTERVENTIONS:  - Assess and monitor for signs and symptoms of infection  - Monitor lab/diagnostic results  - Monitor all insertion sites, i e  indwelling lines, tubes, and drains  - Monitor endotracheal if appropriate and nasal secretions for changes in amount and color  - [place] appropriate cooling/warming therapies per order  - Administer medications as ordered  - Instruct and encourage patient and family to use good hand hygiene technique  - Identify and instruct in appropriate isolation precautions for identified infection/condition  Outcome: Progressing  Goal: Absence of fever/infection during neutropenic period  Description  INTERVENTIONS:  - Monitor WBC    Outcome: Progressing     Problem: SAFETY ADULT  Goal: Patient will remain free of falls  Description  INTERVENTIONS:  - Assess patient frequently for physical needs  -  Identify cognitive and physical deficits and behaviors that affect risk of falls    -  [place] fall precautions as indicated by assessment   - Educate patient/family on patient safety including physical limitations  - Instruct patient to call for assistance with activity based on assessment  - Modify environment to reduce risk of injury  - Consider OT/PT consult to assist with strengthening/mobility  Outcome: Progressing  Goal: Maintain or return to baseline ADL function  Description  INTERVENTIONS:  -  Assess patient's ability to carry out ADLs; assess patient's baseline for ADL function and identify physical deficits which impact ability to perform ADLs (bathing, care of mouth/teeth, toileting, grooming, dressing, etc )  - Assess/evaluate cause of self-care deficits   - Assess range of motion  - Assess patient's mobility; develop plan if impaired  - Assess patient's need for assistive devices and provide as appropriate  - Encourage maximum independence but intervene and supervise when necessary  - Involve family in performance of ADLs  - Assess for home care needs following discharge   - Consider OT consult to assist with ADL evaluation and planning for discharge  - Provide patient education as appropriate  Outcome: Progressing  Goal: Maintain or return mobility status to optimal level  Description  INTERVENTIONS:  - Assess patient's baseline mobility status (ambulation, transfers, stairs, etc )    - Identify cognitive and physical deficits and behaviors that affect mobility  - Identify mobility aids required to assist with transfers and/or ambulation (gait belt, sit-to-stand, lift, walker, cane, etc )  - [place] fall precautions as indicated by assessment  - Record patient progress and toleration of activity level on Mobility SBAR; progress patient to next Phase/Stage  - Instruct patient to call for assistance with activity based on assessment  - Consider rehabilitation consult to assist with strengthening/weightbearing, etc   Outcome: Progressing     Problem: DISCHARGE PLANNING  Goal: Discharge to home or other facility with appropriate resources  Description  INTERVENTIONS:  - Identify barriers to discharge w/patient and caregiver  - Arrange for needed discharge resources and transportation as appropriate  - Identify discharge learning needs (meds, wound care, etc )  - Arrange for interpretive services to assist at discharge as needed  - Refer to Case Management Department for coordinating discharge planning if the patient needs post-hospital services based on physician/advanced practitioner order or complex needs related to functional status, cognitive ability, or social support system  Outcome: Progressing     Problem: Knowledge Deficit  Goal: Patient/family/caregiver demonstrates understanding of disease process, treatment plan, medications, and discharge instructions  Description  Complete learning assessment and assess knowledge base    Interventions:  - Provide teaching at level of understanding  - Provide teaching via preferred learning methods  Outcome: Progressing     Problem: DISCHARGE PLANNING - CARE MANAGEMENT  Goal: Discharge to post-acute care or home with appropriate resources  Description  INTERVENTIONS:  - Conduct assessment to determine patient/family and health care team treatment goals, and need for post-acute services based on payer coverage, community resources, and patient preferences, and barriers to discharge  - Address psychosocial, clinical, and financial barriers to discharge as identified in assessment in conjunction with the patient/family and health care team  - Arrange appropriate level of post-acute services according to patients   needs and preference and payer coverage in collaboration with the physician and health care team  - Communicate with and update the patient/family, physician, and health care team regarding progress on the discharge plan  - Arrange appropriate transportation to post-acute venues  Outcome: Progressing

## 2020-03-20 NOTE — ANESTHESIA POSTPROCEDURE EVALUATION
Post-Op Assessment Note    CV Status:  Stable  Pain Score: 0    Pain management: adequate     Mental Status:  Alert and awake   Hydration Status:  Euvolemic   PONV Controlled:  Controlled   Airway Patency:  Patent   Post Op Vitals Reviewed: Yes      Staff: CRNA           BP   118/71   Temp      Pulse  93   Resp   20   SpO2   99

## 2020-03-21 LAB
ALBUMIN SERPL BCP-MCNC: 2.9 G/DL (ref 3.5–5.7)
ALP SERPL-CCNC: 204 U/L (ref 55–165)
ALT SERPL W P-5'-P-CCNC: 30 U/L (ref 7–52)
ANION GAP SERPL CALCULATED.3IONS-SCNC: 9 MMOL/L (ref 4–13)
AST SERPL W P-5'-P-CCNC: 18 U/L (ref 13–39)
BILIRUB SERPL-MCNC: 0.2 MG/DL (ref 0.2–1)
BUN SERPL-MCNC: 2 MG/DL (ref 7–25)
CALCIUM SERPL-MCNC: 8.3 MG/DL (ref 8.6–10.5)
CHLORIDE SERPL-SCNC: 98 MMOL/L (ref 98–107)
CO2 SERPL-SCNC: 28 MMOL/L (ref 21–31)
CREAT SERPL-MCNC: 0.4 MG/DL (ref 0.6–1.2)
GFR SERPL CREATININE-BSD FRML MDRD: 97 ML/MIN/1.73SQ M
GLUCOSE SERPL-MCNC: 130 MG/DL (ref 65–99)
POTASSIUM SERPL-SCNC: 3 MMOL/L (ref 3.5–5.5)
PROT SERPL-MCNC: 5.4 G/DL (ref 6.4–8.9)
SODIUM SERPL-SCNC: 135 MMOL/L (ref 134–143)

## 2020-03-21 PROCEDURE — 80053 COMPREHEN METABOLIC PANEL: CPT | Performed by: PHYSICIAN ASSISTANT

## 2020-03-21 PROCEDURE — 99024 POSTOP FOLLOW-UP VISIT: CPT | Performed by: PHYSICIAN ASSISTANT

## 2020-03-21 PROCEDURE — 94640 AIRWAY INHALATION TREATMENT: CPT

## 2020-03-21 PROCEDURE — 94760 N-INVAS EAR/PLS OXIMETRY 1: CPT

## 2020-03-21 PROCEDURE — C9113 INJ PANTOPRAZOLE SODIUM, VIA: HCPCS | Performed by: PHYSICIAN ASSISTANT

## 2020-03-21 RX ORDER — POTASSIUM CHLORIDE 20MEQ/15ML
20 LIQUID (ML) ORAL ONCE
Status: COMPLETED | OUTPATIENT
Start: 2020-03-21 | End: 2020-03-21

## 2020-03-21 RX ORDER — LIDOCAINE 50 MG/G
1 PATCH TOPICAL DAILY
Status: DISCONTINUED | OUTPATIENT
Start: 2020-03-21 | End: 2020-03-22 | Stop reason: HOSPADM

## 2020-03-21 RX ORDER — POTASSIUM CHLORIDE 20MEQ/15ML
40 LIQUID (ML) ORAL ONCE
Status: COMPLETED | OUTPATIENT
Start: 2020-03-21 | End: 2020-03-21

## 2020-03-21 RX ADMIN — MUPIROCIN 1 APPLICATION: 20 OINTMENT TOPICAL at 08:19

## 2020-03-21 RX ADMIN — POTASSIUM CHLORIDE 20 MEQ: 20 SOLUTION ORAL at 17:33

## 2020-03-21 RX ADMIN — IPRATROPIUM BROMIDE AND ALBUTEROL SULFATE 3 ML: 2.5; .5 SOLUTION RESPIRATORY (INHALATION) at 13:27

## 2020-03-21 RX ADMIN — METOCLOPRAMIDE 10 MG: 5 INJECTION, SOLUTION INTRAMUSCULAR; INTRAVENOUS at 12:50

## 2020-03-21 RX ADMIN — DEXTROSE AND SODIUM CHLORIDE 100 ML/HR: 5; 450 INJECTION, SOLUTION INTRAVENOUS at 17:33

## 2020-03-21 RX ADMIN — METOCLOPRAMIDE 10 MG: 5 INJECTION, SOLUTION INTRAMUSCULAR; INTRAVENOUS at 05:10

## 2020-03-21 RX ADMIN — MUPIROCIN 1 APPLICATION: 20 OINTMENT TOPICAL at 20:16

## 2020-03-21 RX ADMIN — PANTOPRAZOLE SODIUM 40 MG: 40 INJECTION, POWDER, FOR SOLUTION INTRAVENOUS at 20:07

## 2020-03-21 RX ADMIN — METOCLOPRAMIDE 10 MG: 5 INJECTION, SOLUTION INTRAMUSCULAR; INTRAVENOUS at 00:44

## 2020-03-21 RX ADMIN — ZOLPIDEM TARTRATE 5 MG: 5 TABLET, FILM COATED ORAL at 20:57

## 2020-03-21 RX ADMIN — BUDESONIDE AND FORMOTEROL FUMARATE DIHYDRATE 2 PUFF: 160; 4.5 AEROSOL RESPIRATORY (INHALATION) at 08:12

## 2020-03-21 RX ADMIN — METOCLOPRAMIDE 10 MG: 5 INJECTION, SOLUTION INTRAMUSCULAR; INTRAVENOUS at 17:33

## 2020-03-21 RX ADMIN — Medication: at 18:17

## 2020-03-21 RX ADMIN — DEXTROSE AND SODIUM CHLORIDE 125 ML/HR: 5; 450 INJECTION, SOLUTION INTRAVENOUS at 00:47

## 2020-03-21 RX ADMIN — KETOROLAC TROMETHAMINE 15 MG: 30 INJECTION, SOLUTION INTRAMUSCULAR at 14:34

## 2020-03-21 RX ADMIN — Medication: at 08:15

## 2020-03-21 RX ADMIN — IPRATROPIUM BROMIDE AND ALBUTEROL SULFATE 3 ML: 2.5; .5 SOLUTION RESPIRATORY (INHALATION) at 19:27

## 2020-03-21 RX ADMIN — KETOROLAC TROMETHAMINE 15 MG: 30 INJECTION, SOLUTION INTRAMUSCULAR at 05:09

## 2020-03-21 RX ADMIN — BUDESONIDE AND FORMOTEROL FUMARATE DIHYDRATE 2 PUFF: 160; 4.5 AEROSOL RESPIRATORY (INHALATION) at 17:32

## 2020-03-21 RX ADMIN — KETOROLAC TROMETHAMINE 15 MG: 30 INJECTION, SOLUTION INTRAMUSCULAR at 20:57

## 2020-03-21 RX ADMIN — POTASSIUM CHLORIDE 40 MEQ: 20 SOLUTION ORAL at 12:48

## 2020-03-21 RX ADMIN — ENOXAPARIN SODIUM 40 MG: 40 INJECTION SUBCUTANEOUS at 08:19

## 2020-03-21 RX ADMIN — PANTOPRAZOLE SODIUM 40 MG: 40 INJECTION, POWDER, FOR SOLUTION INTRAVENOUS at 08:12

## 2020-03-21 RX ADMIN — IPRATROPIUM BROMIDE AND ALBUTEROL SULFATE 3 ML: 2.5; .5 SOLUTION RESPIRATORY (INHALATION) at 07:24

## 2020-03-21 RX ADMIN — DEXTROSE AND SODIUM CHLORIDE 125 ML/HR: 5; 450 INJECTION, SOLUTION INTRAVENOUS at 08:29

## 2020-03-21 RX ADMIN — LIDOCAINE 1 PATCH: 50 PATCH TOPICAL at 12:44

## 2020-03-21 NOTE — PROGRESS NOTES
Progress Note - Pastor Garcia 1935, 82 y o  female MRN: [id number]    Unit/Bed#: -01 Encounter: [id number]    Primary Care Provider: Jessica Hollins DO   Date and time admitted to hospital: 3/17/2020  6:42 PM        Gastric outlet obstruction  Assessment & Plan  POD 1 status post EGD with balloon dilation of the gastrojejunal anastomosis breakdown of phytobezoar  Patient clinically stable, tolerating clears, no active nausea or vomiting, having bowel movements  Hemodynamically stable, a of VSS  Hematologically stable, mild hypokalemia being repleted  Patient stable from surgical perspective  No indications for additional testing or procedures at this time  Would continue full liquid diet and follow up with general surgery as an outpatient  Subjective/Objective   Chief Complaint:  "I feel better"    Subjective:  Patient reports improvement of her nausea, vomiting, abdominal pain  Tolerating clear liquids without issue  Started having bowel movements of small solid stools multiple times over the past 12 hours  No new symptoms  Fevers or chills  Objective:  No overnight events documented  Blood pressure 110/62, pulse 77, temperature (!) 97 °F (36 1 °C), temperature source Tympanic, resp  rate 18, height 5' (1 524 m), weight 65 8 kg (145 lb), SpO2 94 %, not currently breastfeeding  ,Body mass index is 28 32 kg/m²  Intake/Output Summary (Last 24 hours) at 3/21/2020 1358  Last data filed at 3/21/2020 3491  Gross per 24 hour   Intake 3000 ml   Output    Net 3000 ml       Invasive Devices     Peripheral Intravenous Line            Peripheral IV 03/19/20 Right;Ventral (anterior) Hand 1 day          Drain            External Urinary Catheter 47 days              Physical Exam   Constitutional: She is oriented to person, place, and time  She appears well-developed and well-nourished  No distress  HENT:   Head: Normocephalic and atraumatic     Eyes: Pupils are equal, round, and reactive to light  EOM are normal    Neck: Normal range of motion  Cardiovascular: Normal rate and regular rhythm  No murmur heard  Pulmonary/Chest: Effort normal and breath sounds normal  No respiratory distress  Receiving nebulizer treatment  Abdominal: Soft  Bowel sounds are normal  She exhibits no distension  There is no tenderness  Obese abdomen  Soft, nontender, nondistended  No guarding or rigidity  Upper midline wound is clean  Musculoskeletal: Normal range of motion  Neurological: She is alert and oriented to person, place, and time  Skin: Skin is warm and dry  Capillary refill takes less than 2 seconds  No rash noted  She is not diaphoretic  Psychiatric: She has a normal mood and affect  Her behavior is normal          Lab, Imaging and other studies:  I have personally reviewed pertinent lab results    , CMP:   Lab Results   Component Value Date    SODIUM 135 03/21/2020    K 3 0 (L) 03/21/2020    CL 98 03/21/2020    CO2 28 03/21/2020    BUN 2 (L) 03/21/2020    CREATININE 0 40 (L) 03/21/2020    CALCIUM 8 3 (L) 03/21/2020    AST 18 03/21/2020    ALT 30 03/21/2020    ALKPHOS 204 (H) 03/21/2020    EGFR 97 03/21/2020     VTE Pharmacologic Prophylaxis: Enoxaparin (Lovenox)  VTE Mechanical Prophylaxis: sequential compression device

## 2020-03-21 NOTE — SOCIAL WORK
CM discussed case with Jason TORRES  Pt for discharge in the next 24-48 hours  Pt will return home with [facility] services to resume  Pt states neighbor may be able to transport or she may need assistance with ride home  CM to follow

## 2020-03-21 NOTE — ASSESSMENT & PLAN NOTE
POD 1 status post EGD with balloon dilation of the gastrojejunal anastomosis breakdown of phytobezoar  Patient clinically stable, tolerating clears, no active nausea or vomiting, having bowel movements  Hemodynamically stable, a of VSS  Hematologically stable, mild hypokalemia being repleted  Patient stable from surgical perspective  No indications for additional testing or procedures at this time  Would continue full liquid diet and follow up with general surgery as an outpatient

## 2020-03-21 NOTE — PROGRESS NOTES
Shwetha,#  FVI:[id number] F  YKK:[id number]    UYW:[id number]  Adm Date: 3/17/2020 1842  6:42 PM   ATT PHY: Thai Katherine, [facility]     The patient was seen after reviewing the chart and discussing the case with caring staff  Today during our encounter, the patient reported no new concerns  She is mainly focused on her low back pain, stating that the Toradol 15mg every 6 hours as needed should be decreased to every 5 hours  With reference to her abdominal symptoms, she denies nausea, vomiting, or pain  She reports having 7 formed bowel movements yesterday on the clear liquid diet  Potassium level decreased from 3 3 to 3 0  Objective     Vitals:    03/21/20 0724   BP:    Pulse:    Resp:    Temp:    SpO2: 96%       General Appearance: Awake and Alert  No acute distress  HEENT: Normocephalic, atraumatic  PERRLA, EOMI, MMM  Heart: RRR, no murmurs  Normal S1 and S2   Lungs: CTA bilaterally with fair air entry  Abdomen: soft, non-tender, normoactive bowel sounds    Assessment     Grace Tadeo is a(n) 82 y  o  year old female with Dysphagia/Odynophagia along with Nausea, Vomiting and diarrhea episodes     1  Recurrent Gastric Outlet Obstruction, s/p bilroth 2 anastomosis   Patient is s/p EGD yesterday with successful removal or food impaction, breakdown of phytobenzoar, and dilation of her gastrojejunal anastomosis by Dr Xiao Garrison  Plan is to continue IV Protonix, IV Reglan, and to upgrade today from a clear liquid to a full liquid diet; this will remain in place for 2 weeks before moving to a soft liquid diet  Continue D5 half normal saline at 100cc/hr  Patient may get Zofran 4mg IV as needed for nausea  4  Cardiac with history of hypertension, atrial fibrillation   Holding Eliquis, diltiazem, Lasix and metoprolol  Being anticoagulated with Lovenox currently    5  COPD with history of chronic respiratory failure with hypoxemia  Patient is on home oxygenation via nasal cannula with respiratory protocol  Continue Breo Ellipta, albuterol, and ipratropium nebs and oxygen  Holding Singulair  6  Osteoporosis   Holding alendronate along with calcium and vitamin-D   7  DJD/osteoarthritis  Toradol 15 mg every 6 hours on as-needed basis for pain  Will add Lidoderm patch for further analgesia  8  Iron Deficiency Anemia  Holding Niferex  9  Dry eyes   Artificial tears on as-needed basis  10  Midline epigastric area surgical dehiscence wound   Wound dressing as per Dr Osmar De Anda  11  MRSA positive nares  Mupirocin nasal 2 times daily for 4 more days  12  Hypokalemia  KCl PO Liquid 40MEq at 1200 and 20MEq at 1800  BMP tomorrow  Discharge Plan: today or tomorrow home with [facility]  The patient was discussed with Dr Td Houston and he is in agreement with the above note

## 2020-03-22 VITALS
RESPIRATION RATE: 20 BRPM | HEIGHT: 60 IN | TEMPERATURE: 98 F | WEIGHT: 145 LBS | DIASTOLIC BLOOD PRESSURE: 70 MMHG | SYSTOLIC BLOOD PRESSURE: 124 MMHG | OXYGEN SATURATION: 97 % | HEART RATE: 104 BPM | BODY MASS INDEX: 28.47 KG/M2

## 2020-03-22 LAB
ANION GAP SERPL CALCULATED.3IONS-SCNC: 8 MMOL/L (ref 4–13)
BUN SERPL-MCNC: <2 MG/DL (ref 7–25)
CALCIUM SERPL-MCNC: 8.7 MG/DL (ref 8.6–10.5)
CHLORIDE SERPL-SCNC: 100 MMOL/L (ref 98–107)
CO2 SERPL-SCNC: 30 MMOL/L (ref 21–31)
CREAT SERPL-MCNC: 0.37 MG/DL (ref 0.6–1.2)
GFR SERPL CREATININE-BSD FRML MDRD: 100 ML/MIN/1.73SQ M
GLUCOSE SERPL-MCNC: 115 MG/DL (ref 65–99)
POTASSIUM SERPL-SCNC: 3.8 MMOL/L (ref 3.5–5.5)
SODIUM SERPL-SCNC: 138 MMOL/L (ref 134–143)

## 2020-03-22 PROCEDURE — 80048 BASIC METABOLIC PNL TOTAL CA: CPT | Performed by: PHYSICIAN ASSISTANT

## 2020-03-22 PROCEDURE — 94760 N-INVAS EAR/PLS OXIMETRY 1: CPT

## 2020-03-22 PROCEDURE — C9113 INJ PANTOPRAZOLE SODIUM, VIA: HCPCS | Performed by: PHYSICIAN ASSISTANT

## 2020-03-22 PROCEDURE — 94640 AIRWAY INHALATION TREATMENT: CPT

## 2020-03-22 RX ORDER — METOCLOPRAMIDE 10 MG/1
10 TABLET ORAL 4 TIMES DAILY
Qty: 120 TABLET | Refills: 0 | Status: SHIPPED | OUTPATIENT
Start: 2020-03-22 | End: 2020-04-27 | Stop reason: SDUPTHER

## 2020-03-22 RX ORDER — PANTOPRAZOLE SODIUM 40 MG/1
40 TABLET, DELAYED RELEASE ORAL
Qty: 60 TABLET | Refills: 0 | Status: SHIPPED | OUTPATIENT
Start: 2020-03-22 | End: 2020-04-08 | Stop reason: SDUPTHER

## 2020-03-22 RX ADMIN — METOCLOPRAMIDE 10 MG: 5 INJECTION, SOLUTION INTRAMUSCULAR; INTRAVENOUS at 01:00

## 2020-03-22 RX ADMIN — ENOXAPARIN SODIUM 40 MG: 40 INJECTION SUBCUTANEOUS at 09:09

## 2020-03-22 RX ADMIN — IPRATROPIUM BROMIDE AND ALBUTEROL SULFATE 3 ML: 2.5; .5 SOLUTION RESPIRATORY (INHALATION) at 07:14

## 2020-03-22 RX ADMIN — PANTOPRAZOLE SODIUM 40 MG: 40 INJECTION, POWDER, FOR SOLUTION INTRAVENOUS at 09:09

## 2020-03-22 RX ADMIN — BUDESONIDE AND FORMOTEROL FUMARATE DIHYDRATE 2 PUFF: 160; 4.5 AEROSOL RESPIRATORY (INHALATION) at 09:09

## 2020-03-22 RX ADMIN — LIDOCAINE 1 PATCH: 50 PATCH TOPICAL at 09:09

## 2020-03-22 RX ADMIN — DEXTROSE AND SODIUM CHLORIDE 100 ML/HR: 5; 450 INJECTION, SOLUTION INTRAVENOUS at 05:52

## 2020-03-22 RX ADMIN — ALBUTEROL SULFATE 2 PUFF: 90 AEROSOL, METERED RESPIRATORY (INHALATION) at 04:01

## 2020-03-22 RX ADMIN — METOCLOPRAMIDE 10 MG: 5 INJECTION, SOLUTION INTRAMUSCULAR; INTRAVENOUS at 06:01

## 2020-03-22 RX ADMIN — MUPIROCIN 1 APPLICATION: 20 OINTMENT TOPICAL at 09:09

## 2020-03-22 RX ADMIN — KETOROLAC TROMETHAMINE 15 MG: 30 INJECTION, SOLUTION INTRAMUSCULAR at 05:53

## 2020-03-22 NOTE — DISCHARGE SUMMARY
Discharge Summary - Magnolia Conklin 82 y o  female MRN: [id number]    Unit/Bed#: -01 Encounter: [id number]    Admission Date:   Admission Orders (From admission, onward)     Ordered        03/17/20 2223  Inpatient Admission (expected length of stay for this patient Order details is greater than two midnights)  Once                     Admitting Diagnosis: Diarrhea [R19 7]  COPD (chronic obstructive pulmonary disease) ([facility] [address] ) [J44 9]  [facility] ) [S20 956]    HPI/Summary of Hospital Course/Significant Findings: Patient is an 82year old female with a past medical history significant for recent (3 weeks ago) exploratory laparotomy for gastric outlet obstruction with antrectomy and Bilroth II gastrojejunostomy who presented to the ED on 3/17/20 for abdominal pain, nausea/vomiting, and diarrhea  CT of the abdomen revealed dilation of the stomach and duodenum suggestive of proximal jejunal obstruction  Given this finding, she was deemed medically appropriate for inpatient admission to [facility], with referrals to General Surgery (Dr Severa Higashi) and GI (Dr Whitney Parson)  On the floor, the patient was placed NPO for bowel rest, along with IVF and IV Protonix 40mg BID  She was initially started on Diflucan by GI to empirically treat for candidal esophagitis  She received EGD by Dr Whitney Parson, which revealed gastric outlet obstruction with successful removal or food impaction, breakdown of phytobenzoar, and dilation of her gastrojejunal anastomosis, after which he Diflucan was discontinued  She was started on IV Reglan and upgraded to a clear liquid diet  She tolerated the clear liquid diet and was upgraded to full liquid diet, which should remain for 2 weeks (with the exception of PO medications) before switching to a soft diet  General Surgery had cleared the patient for discharge from their standpoint with close outpatient follow up with them and her PCP, Dr Marlene Guerrero   On the day of discharge, she reported having several formed bowel movements without abdominal discomfort, nausea, vomiting or diarrhea, and so we have also cleared the patient from our standpoint  Potassium level was slightly low this admission at 3 0 which was easily repleted with 40MEq + 20MEq prior to the day of discharge  Procedures Performed:   Orders Placed This Encounter   Procedures    ED ECG Documentation Only       Discharge Diagnosis: Gastric Outlet Obstruction    Condition at Discharge: fair     Discharge instructions/Information to patient and family:   See after visit summary for information provided to patient and family  Provisions for Follow-Up Care:  See after visit summary for information related to follow-up care and any pertinent home health orders  PCP: Neno Barrios DO    Disposition: Home    Planned Readmission: No    Discharge Statement   I spent more than 30 minutes discharging the patient  This time was spent on the day of discharge  I had direct contact with the patient on the day of discharge  Discharge Medications:  See after visit summary for reconciled discharge medications provided to patient and family

## 2020-03-22 NOTE — PLAN OF CARE
Problem: Potential for Falls  Goal: Patient will remain free of falls  Description  INTERVENTIONS:  - Assess patient frequently for physical needs  -  Identify cognitive and physical deficits and behaviors that affect risk of falls    -  [place] fall precautions as indicated by assessment   - Educate patient/family on patient safety including physical limitations  - Instruct patient to call for assistance with activity based on assessment  - Modify environment to reduce risk of injury  - Consider OT/PT consult to assist with strengthening/mobility  Outcome: Progressing     Problem: Prexisting or High Potential for Compromised Skin Integrity  Goal: Skin integrity is maintained or improved  Description  INTERVENTIONS:  - Identify patients at risk for skin breakdown  - Assess and monitor skin integrity  - Assess and monitor nutrition and hydration status  - Monitor labs   - Assess for incontinence   - Turn and reposition patient  - Assist with mobility/ambulation  - Relieve pressure over bony prominences  - Avoid friction and shearing  - Provide appropriate hygiene as needed including keeping skin clean and dry  - Evaluate need for skin moisturizer/barrier cream  - Collaborate with interdisciplinary team   - Patient/family teaching  - Consider wound care consult   Outcome: Progressing     Problem: PAIN - ADULT  Goal: Verbalizes/displays adequate comfort level or baseline comfort level  Description  Interventions:  - Encourage patient to monitor pain and request assistance  - Assess pain using appropriate pain scale  - Administer analgesics based on type and severity of pain and evaluate response  - Implement non-pharmacological measures as appropriate and evaluate response  - Consider cultural and social influences on pain and pain management  - Notify physician/advanced practitioner if interventions unsuccessful or patient reports new pain  Outcome: Progressing     Problem: INFECTION - ADULT  Goal: Absence or prevention of progression during hospitalization  Description  INTERVENTIONS:  - Assess and monitor for signs and symptoms of infection  - Monitor lab/diagnostic results  - Monitor all insertion sites, i e  indwelling lines, tubes, and drains  - Monitor endotracheal if appropriate and nasal secretions for changes in amount and color  - [place] appropriate cooling/warming therapies per order  - Administer medications as ordered  - Instruct and encourage patient and family to use good hand hygiene technique  - Identify and instruct in appropriate isolation precautions for identified infection/condition  Outcome: Progressing  Goal: Absence of fever/infection during neutropenic period  Description  INTERVENTIONS:  - Monitor WBC    Outcome: Progressing     Problem: SAFETY ADULT  Goal: Patient will remain free of falls  Description  INTERVENTIONS:  - Assess patient frequently for physical needs  -  Identify cognitive and physical deficits and behaviors that affect risk of falls    -  [place] fall precautions as indicated by assessment   - Educate patient/family on patient safety including physical limitations  - Instruct patient to call for assistance with activity based on assessment  - Modify environment to reduce risk of injury  - Consider OT/PT consult to assist with strengthening/mobility  Outcome: Progressing  Goal: Maintain or return to baseline ADL function  Description  INTERVENTIONS:  -  Assess patient's ability to carry out ADLs; assess patient's baseline for ADL function and identify physical deficits which impact ability to perform ADLs (bathing, care of mouth/teeth, toileting, grooming, dressing, etc )  - Assess/evaluate cause of self-care deficits   - Assess range of motion  - Assess patient's mobility; develop plan if impaired  - Assess patient's need for assistive devices and provide as appropriate  - Encourage maximum independence but intervene and supervise when necessary  - Involve family in performance of ADLs  - Assess for home care needs following discharge   - Consider OT consult to assist with ADL evaluation and planning for discharge  - Provide patient education as appropriate  Outcome: Progressing  Goal: Maintain or return mobility status to optimal level  Description  INTERVENTIONS:  - Assess patient's baseline mobility status (ambulation, transfers, stairs, etc )    - Identify cognitive and physical deficits and behaviors that affect mobility  - Identify mobility aids required to assist with transfers and/or ambulation (gait belt, sit-to-stand, lift, walker, cane, etc )  - [place] fall precautions as indicated by assessment  - Record patient progress and toleration of activity level on Mobility SBAR; progress patient to next Phase/Stage  - Instruct patient to call for assistance with activity based on assessment  - Consider rehabilitation consult to assist with strengthening/weightbearing, etc   Outcome: Progressing     Problem: DISCHARGE PLANNING  Goal: Discharge to home or other facility with appropriate resources  Description  INTERVENTIONS:  - Identify barriers to discharge w/patient and caregiver  - Arrange for needed discharge resources and transportation as appropriate  - Identify discharge learning needs (meds, wound care, etc )  - Arrange for interpretive services to assist at discharge as needed  - Refer to Case Management Department for coordinating discharge planning if the patient needs post-hospital services based on physician/advanced practitioner order or complex needs related to functional status, cognitive ability, or social support system  Outcome: Progressing     Problem: Knowledge Deficit  Goal: Patient/family/caregiver demonstrates understanding of disease process, treatment plan, medications, and discharge instructions  Description  Complete learning assessment and assess knowledge base    Interventions:  - Provide teaching at level of understanding  - Provide teaching via preferred learning methods  Outcome: Progressing     Problem: DISCHARGE PLANNING - CARE MANAGEMENT  Goal: Discharge to post-acute care or home with appropriate resources  Description  INTERVENTIONS:  - Conduct assessment to determine patient/family and health care team treatment goals, and need for post-acute services based on payer coverage, community resources, and patient preferences, and barriers to discharge  - Address psychosocial, clinical, and financial barriers to discharge as identified in assessment in conjunction with the patient/family and health care team  - Arrange appropriate level of post-acute services according to patients   needs and preference and payer coverage in collaboration with the physician and health care team  - Communicate with and update the patient/family, physician, and health care team regarding progress on the discharge plan  - Arrange appropriate transportation to post-acute venues  Outcome: Progressing     Problem: Nutrition/Hydration-ADULT  Goal: Nutrient/Hydration intake appropriate for improving, restoring or maintaining nutritional needs  Description  Monitor and assess patient's nutrition/hydration status for malnutrition  Collaborate with interdisciplinary team and initiate plan and interventions as ordered  Monitor patient's weight and dietary intake as ordered or per policy  Utilize nutrition screening tool and intervene as necessary  Determine patient's food preferences and provide high-protein, high-caloric foods as appropriate       INTERVENTIONS:  - Monitor oral intake, urinary output, labs, and treatment plans  - Assess nutrition and hydration status and recommend course of action  - Evaluate amount of meals eaten  - Assist patient with eating if necessary   - Allow adequate time for meals  - Recommend/ encourage appropriate diets, oral nutritional supplements, and vitamin/mineral supplements  - Order, calculate, and assess calorie counts as needed  - Recommend, monitor, and adjust tube feedings and TPN/PPN based on assessed needs  - Assess need for intravenous fluids  - Provide specific nutrition/hydration education as appropriate  - Include patient/family/caregiver in decisions related to nutrition  Outcome: Progressing

## 2020-03-23 ENCOUNTER — TELEPHONE (OUTPATIENT)
Dept: INTERNAL MEDICINE CLINIC | Facility: CLINIC | Age: 85
End: 2020-03-23

## 2020-03-23 ENCOUNTER — TRANSITIONAL CARE MANAGEMENT (OUTPATIENT)
Dept: INTERNAL MEDICINE CLINIC | Facility: CLINIC | Age: 85
End: 2020-03-23

## 2020-03-23 ENCOUNTER — PATIENT OUTREACH (OUTPATIENT)
Dept: INTERNAL MEDICINE CLINIC | Facility: CLINIC | Age: 85
End: 2020-03-23

## 2020-03-23 NOTE — TELEPHONE ENCOUNTER
Needs TCM note started, adm on 3-17-20 to [place], dc, 3-22-20, dx obstruction, copd  , appt with Dr Roshni Solano on  3-24-20

## 2020-03-24 ENCOUNTER — PATIENT OUTREACH (OUTPATIENT)
Dept: INTERNAL MEDICINE CLINIC | Facility: CLINIC | Age: 85
End: 2020-03-24

## 2020-03-24 ENCOUNTER — OFFICE VISIT (OUTPATIENT)
Dept: INTERNAL MEDICINE CLINIC | Facility: CLINIC | Age: 85
End: 2020-03-24
Payer: MEDICARE

## 2020-03-24 VITALS
OXYGEN SATURATION: 97 % | DIASTOLIC BLOOD PRESSURE: 62 MMHG | BODY MASS INDEX: 24.66 KG/M2 | WEIGHT: 125.6 LBS | RESPIRATION RATE: 16 BRPM | HEART RATE: 95 BPM | TEMPERATURE: 98.7 F | SYSTOLIC BLOOD PRESSURE: 96 MMHG | HEIGHT: 60 IN

## 2020-03-24 DIAGNOSIS — R63.4 WEIGHT LOSS: ICD-10-CM

## 2020-03-24 DIAGNOSIS — Z09 FOLLOW-UP EXAMINATION AFTER ABDOMINAL SURGERY: ICD-10-CM

## 2020-03-24 DIAGNOSIS — K56.609 SMALL BOWEL OBSTRUCTION (HCC): ICD-10-CM

## 2020-03-24 DIAGNOSIS — I48.91 ATRIAL FIBRILLATION WITH RAPID VENTRICULAR RESPONSE (HCC): ICD-10-CM

## 2020-03-24 DIAGNOSIS — S32.000A COMPRESSION FRACTURE OF LUMBAR VERTEBRA, INITIAL ENCOUNTER, UNSPECIFIED LUMBAR VERTEBRAL LEVEL: Primary | ICD-10-CM

## 2020-03-24 DIAGNOSIS — J41.0 SIMPLE CHRONIC BRONCHITIS (HCC): Chronic | ICD-10-CM

## 2020-03-24 PROCEDURE — 99496 TRANSJ CARE MGMT HIGH F2F 7D: CPT | Performed by: INTERNAL MEDICINE

## 2020-03-24 RX ORDER — TRAMADOL HYDROCHLORIDE 50 MG/1
50 TABLET ORAL EVERY 6 HOURS PRN
Qty: 90 TABLET | Refills: 0 | Status: SHIPPED | OUTPATIENT
Start: 2020-03-24 | End: 2020-04-10

## 2020-03-24 NOTE — PROGRESS NOTES
Outpatient Care Management Note:  Outreach call attempted  No answer, no machine  Unable to contact letter mailed to patient

## 2020-03-24 NOTE — LETTER
Date: 03/24/20    Dear Joe Barrios,   My name is Miranda Cesar; I am a registered nurse care manager working with [facility]  I have not been able to reach you and would like to set a time that I can talk with you over the phone or in-person  My work is to help patients that have complex medical conditions get the care they need  This includes patients who may have been in the hospital or emergency room  Please call me with any questions you may have at [phone]  I look forward to meeting with you    Sincerely,  Miranda Cesar RN, BSN    Copy:  Dr Sudhir John

## 2020-03-24 NOTE — PROGRESS NOTES
Assessment/Plan:     No problem-specific Assessment & Plan notes found for this encounter  Diagnoses and all orders for this visit:    Compression fracture of lumbar vertebra, initial encounter, unspecified lumbar vertebral level (HCC)  -     traMADol (ULTRAM) 50 mg tablet; Take 1 tablet (50 mg total) by mouth every 6 (six) hours as needed for moderate pain    Small bowel obstruction (HCC)    Follow-up examination after abdominal surgery    Simple chronic bronchitis (HCC)     We will add Tramadol for her back pain and see if that helps and we will closely monitor for any obstructive symptoms secondary to decreased motility  We will try her on soft foods (ie ground beef or chicken) to increase protein and stem weight loss  Concern over increasing protein deficit and complications from this  Follow up with Dr Marylee Anchors  Subjective:     Patient ID: Lakeshia Rasmussen is a 82 y o  female  The patient is s/p billroth 2 procedure for SBO on 2/3/2020  The recovery was recently complicated secondary to gastric outlet obstruction  She was admitted and GI removed food impaction and this appeared to resolve the issues  She notes formed BM and states no nausea  There is no drainage from the incision of her prior surgery  She has been on a full liquid diet for the past week and has tolerated this without issues  However, her weight is down 20 lbs since her discharge on 3/17/20  She states that she feels she gains no nourishment from her full liquid diet  The patient was placed on Reglan as a mobility agent to help with peristalsis  In addition, she states that she no longer receives any pain medication for her back and can only uses Tylenol for pain that she states is ineffective  She is taking Eliquis for Atrial Fibrillation  She has a history of prednisone use and notes that she is having issues with ecchimosis and easy bruising secondary to atrophy of the skin    She continues to take Trelegy, [employer] and chonic O2 for her COPD  Review of Systems   Constitutional: Negative for chills, fatigue and fever  HENT: Negative  Respiratory: Positive for shortness of breath  Negative for cough and chest tightness  Cardiovascular: Negative for chest pain, palpitations and leg swelling  Gastrointestinal: Negative for abdominal pain, constipation, diarrhea, nausea and vomiting  Genitourinary: Negative  Musculoskeletal: Positive for back pain  Negative for arthralgias and myalgias  Skin: Positive for wound  Neurological: Negative  Hematological: Bruises/bleeds easily  Psychiatric/Behavioral: Negative  Objective:     Physical Exam   Constitutional: She is oriented to person, place, and time  She appears well-developed and well-nourished  HENT:   Head: Normocephalic and atraumatic  Eyes: Pupils are equal, round, and reactive to light  EOM are normal    Neck: Normal range of motion  Neck supple  Cardiovascular: Normal rate, regular rhythm, normal heart sounds and intact distal pulses  No murmur heard  Pulmonary/Chest: Effort normal  No respiratory distress  She has decreased breath sounds in the right lower field and the left lower field  She has no wheezes  Abdominal: Soft  Bowel sounds are normal  There is no tenderness  Musculoskeletal: Normal range of motion  She exhibits no edema  Neurological: She is alert and oriented to person, place, and time  Skin: Skin is warm and dry  Psychiatric: She has a normal mood and affect  Nursing note and vitals reviewed  Vitals:    03/24/20 0856   BP: 96/62   BP Location: Right arm   Patient Position: Sitting   Cuff Size: Standard   Pulse: 95   Resp: 16   Temp: 98 7 °F (37 1 °C)   SpO2: 97%   Weight: 57 kg (125 lb 9 6 oz)   Height: 5' (1 524 m)       Transitional Care Management Review:  Haleigh Rico is a 82 y o  female here for TCM follow up       During the TCM phone call patient stated:    TCM Call (since 2/22/2020)     Date and time call was made  3/23/2020 10:04 AM    Patient was hospitialized at  [address] [facility]    Date of Admission  03/17/20    Date of discharge  03/22/20    Diagnosis  Chronic peptic ulcer of stomach    Disposition  Home; Home health services    Were the patients medications reviewed and updated  Yes    Current Symptoms  None      TCM Call (since 2/22/2020)     Post hospital issues  None    Should patient be enrolled in anticoag monitoring? No    Scheduled for follow up? Yes    Did you obtain your prescribed medications  Yes    Do you need help managing your prescriptions or medications  No    Is transportation to your appointment needed  No    I have advised the patient to call PCP with any new or worsening symptoms  ganesh/siddharth    Are you recieving any outpatient services  No    Are you recieving home care services  Yes    Types of home care services  Home health aid; Home PT; Nurse visit    Are you using any community resources  No    Current waiver services  No    Have you fallen in the last 12 months  No    Interperter language line needed  No    Counseling  Patient    Counseling topics  Diagnostic results; Prognosis; Activities of daily living; Importance of RX compliance; Home health agency benefits; patient and family education; instructions for management;  Risk factor reduction          Hanna Adas, DO

## 2020-03-24 NOTE — PROGRESS NOTES
3/12/2020  2   03/12/2020  Oxycodon-Acetaminophen 7 5-325  40 00 10 Mo Sha   [id number]   Rit (5365)   0  45 00 MME  [place]   02/25/2020  2   02/25/2020  Tramadol Hcl 50 MG Tablet  40 00 6 Ro Chele   [id number]   Rit (5365)   0  33 33 MME  [place]   02/24/2020  2   02/23/2020  Hydrocodone-Acetamin 5-325 MG  10 00 2 Mi Abr   [id number]   Rit (5365)   0  25 00 MME  [place]

## 2020-03-25 ENCOUNTER — TELEPHONE (OUTPATIENT)
Dept: INTERNAL MEDICINE CLINIC | Facility: CLINIC | Age: 85
End: 2020-03-25

## 2020-03-25 ENCOUNTER — OFFICE VISIT (OUTPATIENT)
Dept: SURGERY | Facility: CLINIC | Age: 85
End: 2020-03-25

## 2020-03-25 VITALS
WEIGHT: 125 LBS | DIASTOLIC BLOOD PRESSURE: 64 MMHG | HEART RATE: 96 BPM | BODY MASS INDEX: 24.54 KG/M2 | SYSTOLIC BLOOD PRESSURE: 100 MMHG | TEMPERATURE: 97.8 F | RESPIRATION RATE: 18 BRPM | HEIGHT: 60 IN

## 2020-03-25 DIAGNOSIS — K25.7 CHRONIC PEPTIC ULCER OF STOMACH: Primary | ICD-10-CM

## 2020-03-25 DIAGNOSIS — T81.31XD WOUND DEHISCENCE, SURGICAL, SUBSEQUENT ENCOUNTER: ICD-10-CM

## 2020-03-25 DIAGNOSIS — K56.609 SMALL BOWEL OBSTRUCTION (HCC): ICD-10-CM

## 2020-03-25 PROCEDURE — 1111F DSCHRG MED/CURRENT MED MERGE: CPT | Performed by: SPECIALIST

## 2020-03-25 PROCEDURE — 3008F BODY MASS INDEX DOCD: CPT | Performed by: SPECIALIST

## 2020-03-25 PROCEDURE — 4040F PNEUMOC VAC/ADMIN/RCVD: CPT | Performed by: SPECIALIST

## 2020-03-25 PROCEDURE — 99024 POSTOP FOLLOW-UP VISIT: CPT | Performed by: SPECIALIST

## 2020-03-25 PROCEDURE — 1160F RVW MEDS BY RX/DR IN RCRD: CPT | Performed by: SPECIALIST

## 2020-03-25 NOTE — TELEPHONE ENCOUNTER
Monroe [facility] called stating that she was in today and wanted to call to make a courtesy call regarding patients medication  Nurse states that trelegy that she is taking interacts due to her having an allergy to fluticasone  She states that she is ok so far on this-no problems but just wanted to make you aware       #[phone]

## 2020-03-25 NOTE — PATIENT INSTRUCTIONS
The midline wound is almost completely healed, please leave the dressing in place until next week  It is waterproof, you can shower with it on  I will change it when I see you next time  Please continue the Protonix, taking 40 mg twice a day for the next few weeks  Dr Sarah Alston will review with you if the dose can be changed when he sees you at your visit with him  Continue with a full liquid diet, avoid any salads or vegetables, avoid any meat  I would suggest pudding, and supplements such as Ensure or Boost, but you must drink these slowly so as not to provoke diarrhea  Any problems, give us a call

## 2020-03-25 NOTE — PROGRESS NOTES
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  She is now doing well with a full liquid diet, and Protonix b i d  At the time of the endoscopy the bezoar was broken up, and the anastomosis balloon dilated  Apparently she is not taking, nor has required Reglan  Chronic peptic ulcer of stomach  Patient is now 6 and half weeks out from antrectomy with Billroth II gastrojejunostomy  This was complicated by wound dehiscence which is now healing  At the time of her endoscopy for dilatation of the gastric remnant due to obstruction of the anastomosis, she was found to have some edema of the stomach at the site of the anastomosis, and it was friable, but no marginal ulceration  She responded well to IV Protonix, and is now on oral Protonix twice a day as opposed to once a day before her hospitalization  Wound dehiscence, surgical, subsequent encounter  The midline wound is almost and care early epithelialized, a small layer of granulation is present, and clean fibrinous debris covering it  Debridement is not indicated, and the wound was dressed with SilvaSorb gel and an occlusive dressing which can be left in place until her visit next week  Diagnoses and all orders for this visit:    Chronic peptic ulcer of stomach    Small bowel obstruction (HCC)    Wound dehiscence, surgical, subsequent encounter          Subjective:      Patient ID: Gabriela Cristobal is a 82 y o  female      HPI    The following portions of the patient's history were reviewed and updated as appropriate: allergies, current medications, past family history, past medical history, past social history, past surgical history and problem list     Review of Systems      Objective:      /64 (BP Location: Left arm, Patient Position: Sitting, Cuff Size: Standard)   Pulse 96   Temp 97 8 °F (36 6 °C) (Tympanic)   Resp 18   Ht 5' (1 524 m)   Wt 56 7 kg (125 lb)   LMP  (LMP Unknown)   BMI 24 41 kg/m²          Physical Exam

## 2020-03-25 NOTE — ASSESSMENT & PLAN NOTE
The midline wound is almost and care early epithelialized, a small layer of granulation is present, and clean fibrinous debris covering it  Debridement is not indicated, and the wound was dressed with SilvaSorb gel and an occlusive dressing which can be left in place until her visit next week

## 2020-03-25 NOTE — ASSESSMENT & PLAN NOTE
Patient is now 6 and half weeks out from antrectomy with Billroth II gastrojejunostomy  This was complicated by wound dehiscence which is now healing  At the time of her endoscopy for dilatation of the gastric remnant due to obstruction of the anastomosis, she was found to have some edema of the stomach at the site of the anastomosis, and it was friable, but no marginal ulceration  She responded well to IV Protonix, and is now on oral Protonix twice a day as opposed to once a day before her hospitalization

## 2020-03-26 DIAGNOSIS — I48.0 PAROXYSMAL ATRIAL FIBRILLATION (HCC): ICD-10-CM

## 2020-03-31 ENCOUNTER — TELEPHONE (OUTPATIENT)
Dept: SURGERY | Facility: CLINIC | Age: 85
End: 2020-03-31

## 2020-04-01 ENCOUNTER — OFFICE VISIT (OUTPATIENT)
Dept: SURGERY | Facility: CLINIC | Age: 85
End: 2020-04-01

## 2020-04-01 VITALS
HEART RATE: 88 BPM | HEIGHT: 64 IN | TEMPERATURE: 97.2 F | WEIGHT: 123 LBS | RESPIRATION RATE: 16 BRPM | DIASTOLIC BLOOD PRESSURE: 62 MMHG | SYSTOLIC BLOOD PRESSURE: 102 MMHG | BODY MASS INDEX: 21 KG/M2

## 2020-04-01 DIAGNOSIS — T81.31XD WOUND DEHISCENCE, SURGICAL, SUBSEQUENT ENCOUNTER: Primary | ICD-10-CM

## 2020-04-01 DIAGNOSIS — K31.1 GASTRIC OUTLET OBSTRUCTION: ICD-10-CM

## 2020-04-01 PROCEDURE — 1160F RVW MEDS BY RX/DR IN RCRD: CPT | Performed by: SPECIALIST

## 2020-04-01 PROCEDURE — 4040F PNEUMOC VAC/ADMIN/RCVD: CPT | Performed by: SPECIALIST

## 2020-04-01 PROCEDURE — 1036F TOBACCO NON-USER: CPT | Performed by: SPECIALIST

## 2020-04-01 PROCEDURE — 1111F DSCHRG MED/CURRENT MED MERGE: CPT | Performed by: SPECIALIST

## 2020-04-01 PROCEDURE — 99024 POSTOP FOLLOW-UP VISIT: CPT | Performed by: SPECIALIST

## 2020-04-03 ENCOUNTER — TELEPHONE (OUTPATIENT)
Dept: SURGERY | Facility: CLINIC | Age: 85
End: 2020-04-03

## 2020-04-07 ENCOUNTER — PATIENT OUTREACH (OUTPATIENT)
Dept: CASE MANAGEMENT | Facility: OTHER | Age: 85
End: 2020-04-07

## 2020-04-07 ENCOUNTER — PATIENT OUTREACH (OUTPATIENT)
Dept: INTERNAL MEDICINE CLINIC | Facility: CLINIC | Age: 85
End: 2020-04-07

## 2020-04-07 ENCOUNTER — TELEPHONE (OUTPATIENT)
Dept: INTERNAL MEDICINE CLINIC | Facility: CLINIC | Age: 85
End: 2020-04-07

## 2020-04-07 DIAGNOSIS — J41.0 SIMPLE CHRONIC BRONCHITIS (HCC): Primary | Chronic | ICD-10-CM

## 2020-04-07 DIAGNOSIS — J96.11 CHRONIC RESPIRATORY FAILURE WITH HYPOXIA (HCC): Chronic | ICD-10-CM

## 2020-04-08 ENCOUNTER — OFFICE VISIT (OUTPATIENT)
Dept: SURGERY | Facility: CLINIC | Age: 85
End: 2020-04-08
Payer: MEDICARE

## 2020-04-08 VITALS
TEMPERATURE: 97.9 F | DIASTOLIC BLOOD PRESSURE: 63 MMHG | WEIGHT: 123 LBS | RESPIRATION RATE: 16 BRPM | HEIGHT: 64 IN | SYSTOLIC BLOOD PRESSURE: 100 MMHG | HEART RATE: 78 BPM | BODY MASS INDEX: 21 KG/M2

## 2020-04-08 DIAGNOSIS — K21.9 GERD (GASTROESOPHAGEAL REFLUX DISEASE): ICD-10-CM

## 2020-04-08 DIAGNOSIS — K31.1 GASTRIC OUTLET OBSTRUCTION: ICD-10-CM

## 2020-04-08 DIAGNOSIS — T81.31XD WOUND DEHISCENCE, SURGICAL, SUBSEQUENT ENCOUNTER: Primary | ICD-10-CM

## 2020-04-08 PROCEDURE — 1036F TOBACCO NON-USER: CPT | Performed by: SPECIALIST

## 2020-04-08 PROCEDURE — 1111F DSCHRG MED/CURRENT MED MERGE: CPT | Performed by: SPECIALIST

## 2020-04-08 PROCEDURE — 3008F BODY MASS INDEX DOCD: CPT | Performed by: SPECIALIST

## 2020-04-08 PROCEDURE — 1160F RVW MEDS BY RX/DR IN RCRD: CPT | Performed by: SPECIALIST

## 2020-04-08 PROCEDURE — 99212 OFFICE O/P EST SF 10 MIN: CPT | Performed by: SPECIALIST

## 2020-04-08 PROCEDURE — 4040F PNEUMOC VAC/ADMIN/RCVD: CPT | Performed by: SPECIALIST

## 2020-04-09 RX ORDER — PANTOPRAZOLE SODIUM 40 MG/1
40 TABLET, DELAYED RELEASE ORAL
Qty: 60 TABLET | Refills: 5 | Status: SHIPPED | OUTPATIENT
Start: 2020-04-09 | End: 2020-04-10 | Stop reason: SDUPTHER

## 2020-04-10 ENCOUNTER — TELEPHONE (OUTPATIENT)
Dept: INTERNAL MEDICINE CLINIC | Facility: CLINIC | Age: 85
End: 2020-04-10

## 2020-04-10 DIAGNOSIS — S32.000A COMPRESSION FRACTURE OF LUMBAR VERTEBRA, INITIAL ENCOUNTER, UNSPECIFIED LUMBAR VERTEBRAL LEVEL: Primary | ICD-10-CM

## 2020-04-10 DIAGNOSIS — K21.9 GERD (GASTROESOPHAGEAL REFLUX DISEASE): ICD-10-CM

## 2020-04-10 RX ORDER — PANTOPRAZOLE SODIUM 40 MG/1
40 TABLET, DELAYED RELEASE ORAL
Qty: 60 TABLET | Refills: 5 | Status: SHIPPED | OUTPATIENT
Start: 2020-04-10 | End: 2020-05-26

## 2020-04-10 RX ORDER — OXYCODONE HYDROCHLORIDE AND ACETAMINOPHEN 5; 325 MG/1; MG/1
1 TABLET ORAL EVERY 4 HOURS PRN
Qty: 28 TABLET | Refills: 0 | Status: SHIPPED | OUTPATIENT
Start: 2020-04-10 | End: 2020-04-13 | Stop reason: SDUPTHER

## 2020-04-13 ENCOUNTER — OFFICE VISIT (OUTPATIENT)
Dept: INTERNAL MEDICINE CLINIC | Facility: CLINIC | Age: 85
End: 2020-04-13
Payer: MEDICARE

## 2020-04-13 ENCOUNTER — TELEPHONE (OUTPATIENT)
Dept: SURGERY | Facility: CLINIC | Age: 85
End: 2020-04-13

## 2020-04-13 VITALS
DIASTOLIC BLOOD PRESSURE: 62 MMHG | OXYGEN SATURATION: 92 % | TEMPERATURE: 97.6 F | HEART RATE: 92 BPM | RESPIRATION RATE: 14 BRPM | SYSTOLIC BLOOD PRESSURE: 100 MMHG

## 2020-04-13 DIAGNOSIS — K31.1 GASTRIC OUTLET OBSTRUCTION: Primary | ICD-10-CM

## 2020-04-13 DIAGNOSIS — S32.040A COMPRESSION FRACTURE OF L4 VERTEBRA, INITIAL ENCOUNTER (HCC): ICD-10-CM

## 2020-04-13 DIAGNOSIS — J41.0 SIMPLE CHRONIC BRONCHITIS (HCC): Chronic | ICD-10-CM

## 2020-04-13 DIAGNOSIS — S22.080A CLOSED WEDGE COMPRESSION FRACTURE OF TWELFTH THORACIC VERTEBRA, INITIAL ENCOUNTER: ICD-10-CM

## 2020-04-13 DIAGNOSIS — S32.000A COMPRESSION FRACTURE OF LUMBAR VERTEBRA, INITIAL ENCOUNTER, UNSPECIFIED LUMBAR VERTEBRAL LEVEL: ICD-10-CM

## 2020-04-13 PROCEDURE — 1160F RVW MEDS BY RX/DR IN RCRD: CPT | Performed by: INTERNAL MEDICINE

## 2020-04-13 PROCEDURE — 4040F PNEUMOC VAC/ADMIN/RCVD: CPT | Performed by: INTERNAL MEDICINE

## 2020-04-13 PROCEDURE — 99214 OFFICE O/P EST MOD 30 MIN: CPT | Performed by: INTERNAL MEDICINE

## 2020-04-13 PROCEDURE — 1111F DSCHRG MED/CURRENT MED MERGE: CPT | Performed by: INTERNAL MEDICINE

## 2020-04-13 PROCEDURE — 1036F TOBACCO NON-USER: CPT | Performed by: INTERNAL MEDICINE

## 2020-04-13 RX ORDER — OXYCODONE HYDROCHLORIDE AND ACETAMINOPHEN 5; 325 MG/1; MG/1
1 TABLET ORAL EVERY 4 HOURS PRN
Qty: 48 TABLET | Refills: 0 | Status: SHIPPED | OUTPATIENT
Start: 2020-04-13 | End: 2020-04-20

## 2020-04-20 ENCOUNTER — TELEMEDICINE (OUTPATIENT)
Dept: PAIN MEDICINE | Facility: CLINIC | Age: 85
End: 2020-04-20
Payer: MEDICARE

## 2020-04-20 ENCOUNTER — TELEPHONE (OUTPATIENT)
Dept: SURGERY | Facility: CLINIC | Age: 85
End: 2020-04-20

## 2020-04-20 ENCOUNTER — TELEPHONE (OUTPATIENT)
Dept: PAIN MEDICINE | Facility: CLINIC | Age: 85
End: 2020-04-20

## 2020-04-20 DIAGNOSIS — M54.40 CHRONIC LOW BACK PAIN WITH SCIATICA, SCIATICA LATERALITY UNSPECIFIED, UNSPECIFIED BACK PAIN LATERALITY: ICD-10-CM

## 2020-04-20 DIAGNOSIS — S32.000S LUMBAR COMPRESSION FRACTURE, SEQUELA: Primary | ICD-10-CM

## 2020-04-20 DIAGNOSIS — J41.0 SIMPLE CHRONIC BRONCHITIS (HCC): ICD-10-CM

## 2020-04-20 DIAGNOSIS — E66.09 CLASS 1 OBESITY DUE TO EXCESS CALORIES WITH SERIOUS COMORBIDITY AND BODY MASS INDEX (BMI) OF 34.0 TO 34.9 IN ADULT: ICD-10-CM

## 2020-04-20 DIAGNOSIS — M54.16 LUMBAR RADICULOPATHY: ICD-10-CM

## 2020-04-20 DIAGNOSIS — G89.29 CHRONIC LOW BACK PAIN WITH SCIATICA, SCIATICA LATERALITY UNSPECIFIED, UNSPECIFIED BACK PAIN LATERALITY: ICD-10-CM

## 2020-04-20 DIAGNOSIS — S32.040A COMPRESSION FRACTURE OF L4 VERTEBRA, INITIAL ENCOUNTER (HCC): ICD-10-CM

## 2020-04-20 DIAGNOSIS — S22.080A CLOSED WEDGE COMPRESSION FRACTURE OF TWELFTH THORACIC VERTEBRA, INITIAL ENCOUNTER: ICD-10-CM

## 2020-04-20 DIAGNOSIS — M54.40 CHRONIC LOW BACK PAIN WITH SCIATICA, SCIATICA LATERALITY UNSPECIFIED, UNSPECIFIED BACK PAIN LATERALITY: Primary | Chronic | ICD-10-CM

## 2020-04-20 DIAGNOSIS — G89.4 CHRONIC PAIN SYNDROME: ICD-10-CM

## 2020-04-20 DIAGNOSIS — S32.000A COMPRESSION FRACTURE OF LUMBAR VERTEBRA, INITIAL ENCOUNTER, UNSPECIFIED LUMBAR VERTEBRAL LEVEL: ICD-10-CM

## 2020-04-20 DIAGNOSIS — Z79.891 ENCOUNTER FOR LONG-TERM OPIATE ANALGESIC USE: ICD-10-CM

## 2020-04-20 DIAGNOSIS — Z79.891 LONG-TERM CURRENT USE OF OPIATE ANALGESIC: ICD-10-CM

## 2020-04-20 DIAGNOSIS — F11.20 UNCOMPLICATED OPIOID DEPENDENCE (HCC): ICD-10-CM

## 2020-04-20 DIAGNOSIS — G89.29 CHRONIC LOW BACK PAIN WITH SCIATICA, SCIATICA LATERALITY UNSPECIFIED, UNSPECIFIED BACK PAIN LATERALITY: Primary | Chronic | ICD-10-CM

## 2020-04-20 PROCEDURE — 80305 DRUG TEST PRSMV DIR OPT OBS: CPT | Performed by: ANESTHESIOLOGY

## 2020-04-20 PROCEDURE — 99214 OFFICE O/P EST MOD 30 MIN: CPT | Performed by: ANESTHESIOLOGY

## 2020-04-20 RX ORDER — OXYCODONE HYDROCHLORIDE AND ACETAMINOPHEN 5; 325 MG/1; MG/1
1 TABLET ORAL EVERY 6 HOURS PRN
Qty: 120 TABLET | Refills: 0 | Status: SHIPPED | OUTPATIENT
Start: 2020-04-28 | End: 2020-05-20 | Stop reason: SDUPTHER

## 2020-04-23 LAB
6MAM UR QL CFM: NEGATIVE NG/ML
7AMINOCLONAZEPAM UR QL CFM: NEGATIVE NG/ML
A-OH ALPRAZ UR QL CFM: NEGATIVE NG/ML
ACCEPTABLE CREAT UR QL: NORMAL MG/DL
ACCEPTIBLE SP GR UR QL: NORMAL
AMPHET UR QL CFM: NEGATIVE NG/ML
AMPHET UR QL CFM: NEGATIVE NG/ML
BUPRENORPHINE UR QL CFM: NEGATIVE NG/ML
BUTALBITAL UR QL CFM: NEGATIVE NG/ML
BZE UR QL CFM: NEGATIVE NG/ML
CODEINE UR QL CFM: NEGATIVE NG/ML
DESIPRAMINE UR QL CFM: NEGATIVE NG/ML
DESIPRAMINE UR QL CFM: NEGATIVE NG/ML
EDDP UR QL CFM: NEGATIVE NG/ML
ETHYL GLUCURONIDE UR CFM-MCNC: ABNORMAL NG/ML
ETHYL SULFATE UR QL SCN: NEGATIVE NG/ML
FENTANYL UR QL CFM: NEGATIVE NG/ML
GLIADIN IGG SER IA-ACNC: NEGATIVE NG/ML
GLUCOSE 30M P 50 G LAC PO SERPL-MCNC: NEGATIVE NG/ML
HYDROCODONE UR QL CFM: NEGATIVE NG/ML
HYDROCODONE UR QL CFM: NEGATIVE NG/ML
HYDROMORPHONE UR QL CFM: NEGATIVE NG/ML
IMIPRAMINE UR QL CFM: NEGATIVE NG/ML
LORAZEPAM UR QL CFM: NEGATIVE NG/ML
MDMA UR QL CFM: NEGATIVE NG/ML
ME-PHENIDATE UR QL CFM: NEGATIVE NG/ML
MEPERIDINE UR QL CFM: NEGATIVE NG/ML
MEPHEDRONE UR QL CFM: NEGATIVE NG/ML
METHADONE UR QL CFM: NEGATIVE NG/ML
METHAMPHET UR QL CFM: NEGATIVE NG/ML
MORPHINE UR QL CFM: NEGATIVE NG/ML
MORPHINE UR QL CFM: NEGATIVE NG/ML
NITRITE UR QL: NORMAL UG/ML
NORBUPRENORPHINE UR QL CFM: NEGATIVE NG/ML
NORDIAZEPAM UR QL CFM: NEGATIVE NG/ML
NORFENTANYL UR QL CFM: NEGATIVE NG/ML
NORHYDROCODONE UR QL CFM: NEGATIVE NG/ML
NORHYDROCODONE UR QL CFM: NEGATIVE NG/ML
NORMEPERIDINE UR QL CFM: NEGATIVE NG/ML
NOROXYCODONE UR CFM-MCNC: >6400 NG/ML
OLANZAPINE QUANTIFICATION: NEGATIVE NG/ML
OPC-3373 QUANTIFICATION: NEGATIVE
OXAZEPAM UR QL CFM: NEGATIVE NG/ML
OXYCODONE UR CFM-MCNC: 2714.45 NG/ML
OXYMORPHONE UR CFM-MCNC: 5382.83 NG/ML
OXYMORPHONE UR CFM-MCNC: 5382.83 NG/ML
PCP UR QL CFM: NEGATIVE NG/ML
PHENOBARB UR QL CFM: NEGATIVE NG/ML
RESULT ALL_PRESCRIBED MEDS AND SPECIAL INSTRUCTIONS: NORMAL
SECOBARBITAL UR QL CFM: NEGATIVE NG/ML
SL AMB 3-METHYL-FENTANYL QUANTIFICATION: NORMAL NG/ML
SL AMB 4-ANPP QUANTIFICATION: NORMAL NG/ML
SL AMB 4-FIBF QUANTIFICATION: NORMAL NG/ML
SL AMB 5F-ADB-M7 METABOLITE QUANTIFICATION: NEGATIVE NG/ML
SL AMB 7-OH-MITRAGYNINE (KRATOM ALKALOID) QUANTIFICATION: NEGATIVE NG/ML
SL AMB AB-FUBINACA-M3 METABOLITE QUANTIFICATION: NEGATIVE NG/ML
SL AMB ACETYL FENTANYL QUANTIFICATION: NORMAL NG/ML
SL AMB ACETYL NORFENTANYL QUANTIFICATION: NORMAL NG/ML
SL AMB ACRYL FENTANYL QUANTIFICATION: NORMAL NG/ML
SL AMB BATH SALTS: NEGATIVE NG/ML
SL AMB BUTRYL FENTANYL QUANTIFICATION: NORMAL NG/ML
SL AMB CARFENTANIL QUANTIFICATION: NORMAL NG/ML
SL AMB CLOZAPINE QUANTIFICATION: NEGATIVE NG/ML
SL AMB CTHC (MARIJUANA METABOLITE) QUANTIFICATION: NEGATIVE NG/ML
SL AMB CYCLOPROPYL FENTANYL QUANTIFICATION: NORMAL NG/ML
SL AMB DEXTROMETHORPHAN QUANTIFICATION: NEGATIVE NG/ML
SL AMB DEXTRORPHAN (DEXTROMETHORPHAN METABOLITE) QUANT: NEGATIVE NG/ML
SL AMB DEXTRORPHAN (DEXTROMETHORPHAN METABOLITE) QUANT: NEGATIVE NG/ML
SL AMB FURANYL FENTANYL QUANTIFICATION: NORMAL NG/ML
SL AMB HALOPERIDOL  QUANTIFICATION: NEGATIVE NG/ML
SL AMB HALOPERIDOL METABOLITE QUANTIFICATION: NEGATIVE NG/ML
SL AMB HYDROXYRISPERIDONE QUANTIFICATION: NEGATIVE NG/ML
SL AMB JWH018 METABOLITE QUANTIFICATION: NEGATIVE NG/ML
SL AMB JWH073 METABOLITE QUANTIFICATION: NEGATIVE NG/ML
SL AMB MDMB-FUBINACA-M1 METABOLITE QUANTIFICATION: NEGATIVE NG/ML
SL AMB METHOXYACETYL FENTANYL QUANTIFICATION: NORMAL NG/ML
SL AMB METHYLONE QUANTIFICATION: NEGATIVE NG/ML
SL AMB N-DESMETHYL U-47700 QUANTIFICATION: NORMAL NG/ML
SL AMB N-DESMETHYL-TRAMADOL QUANTIFICATION: NEGATIVE NG/ML
SL AMB N-DESMETHYLCLOZAPINE QUANTIFICATION: NEGATIVE NG/ML
SL AMB NORQUETIAPINE QUANTIFICATION: NEGATIVE NG/ML
SL AMB PHENTERMINE QUANTIFICATION: NEGATIVE NG/ML
SL AMB QUETIAPINE QUANTIFICATION: NEGATIVE NG/ML
SL AMB RCS4 METABOLITE QUANTIFICATION: NEGATIVE NG/ML
SL AMB RISPERIDONE QUANTIFICATION: NEGATIVE NG/ML
SL AMB RITALINIC ACID QUANTIFICATION: NEGATIVE NG/ML
SL AMB U-47700 QUANTIFICATION: NORMAL NG/ML
SPECIMEN DRAWN SERPL: NEGATIVE NG/ML
SPECIMEN PH ACCEPTABLE UR: NORMAL
TAPENTADOL UR QL CFM: NEGATIVE NG/ML
TEMAZEPAM UR QL CFM: NEGATIVE NG/ML
TEMAZEPAM UR QL CFM: NEGATIVE NG/ML
TRAMADOL UR QL CFM: NEGATIVE NG/ML
URATE/CREAT 24H UR: NEGATIVE NG/ML

## 2020-04-24 ENCOUNTER — TELEPHONE (OUTPATIENT)
Dept: INTERNAL MEDICINE CLINIC | Facility: CLINIC | Age: 85
End: 2020-04-24

## 2020-04-27 ENCOUNTER — OFFICE VISIT (OUTPATIENT)
Dept: INTERNAL MEDICINE CLINIC | Facility: CLINIC | Age: 85
End: 2020-04-27
Payer: MEDICARE

## 2020-04-27 VITALS
SYSTOLIC BLOOD PRESSURE: 108 MMHG | HEART RATE: 82 BPM | DIASTOLIC BLOOD PRESSURE: 72 MMHG | RESPIRATION RATE: 14 BRPM | BODY MASS INDEX: 21.11 KG/M2 | TEMPERATURE: 98.8 F | OXYGEN SATURATION: 95 % | HEIGHT: 64 IN

## 2020-04-27 DIAGNOSIS — K31.1 GASTRIC OUTLET OBSTRUCTION: ICD-10-CM

## 2020-04-27 DIAGNOSIS — J30.2 SEASONAL ALLERGIES: ICD-10-CM

## 2020-04-27 DIAGNOSIS — K25.7 CHRONIC PEPTIC ULCER OF STOMACH: ICD-10-CM

## 2020-04-27 PROCEDURE — 1111F DSCHRG MED/CURRENT MED MERGE: CPT | Performed by: INTERNAL MEDICINE

## 2020-04-27 PROCEDURE — 99214 OFFICE O/P EST MOD 30 MIN: CPT | Performed by: INTERNAL MEDICINE

## 2020-04-27 PROCEDURE — 1160F RVW MEDS BY RX/DR IN RCRD: CPT | Performed by: INTERNAL MEDICINE

## 2020-04-27 PROCEDURE — 4040F PNEUMOC VAC/ADMIN/RCVD: CPT | Performed by: INTERNAL MEDICINE

## 2020-04-27 PROCEDURE — 1036F TOBACCO NON-USER: CPT | Performed by: INTERNAL MEDICINE

## 2020-04-27 RX ORDER — MONTELUKAST SODIUM 10 MG/1
10 TABLET ORAL
Qty: 90 TABLET | Refills: 1 | Status: SHIPPED | OUTPATIENT
Start: 2020-04-27 | End: 2020-10-26

## 2020-04-27 RX ORDER — METOCLOPRAMIDE 10 MG/1
10 TABLET ORAL 4 TIMES DAILY
Qty: 120 TABLET | Refills: 0 | Status: SHIPPED | OUTPATIENT
Start: 2020-04-27 | End: 2020-05-26

## 2020-05-01 ENCOUNTER — TELEPHONE (OUTPATIENT)
Dept: INTERNAL MEDICINE CLINIC | Facility: CLINIC | Age: 85
End: 2020-05-01

## 2020-05-11 DIAGNOSIS — I50.9 CONGESTIVE HEART FAILURE (HCC): ICD-10-CM

## 2020-05-18 ENCOUNTER — TELEPHONE (OUTPATIENT)
Dept: PAIN MEDICINE | Facility: MEDICAL CENTER | Age: 85
End: 2020-05-18

## 2020-05-20 ENCOUNTER — TELEMEDICINE (OUTPATIENT)
Dept: PAIN MEDICINE | Facility: CLINIC | Age: 85
End: 2020-05-20
Payer: MEDICARE

## 2020-05-20 DIAGNOSIS — G89.29 CHRONIC LOW BACK PAIN WITH SCIATICA, SCIATICA LATERALITY UNSPECIFIED, UNSPECIFIED BACK PAIN LATERALITY: Chronic | ICD-10-CM

## 2020-05-20 DIAGNOSIS — M54.16 LUMBAR RADICULOPATHY: ICD-10-CM

## 2020-05-20 DIAGNOSIS — M54.40 CHRONIC LOW BACK PAIN WITH SCIATICA, SCIATICA LATERALITY UNSPECIFIED, UNSPECIFIED BACK PAIN LATERALITY: Chronic | ICD-10-CM

## 2020-05-20 DIAGNOSIS — S22.080A CLOSED WEDGE COMPRESSION FRACTURE OF TWELFTH THORACIC VERTEBRA, INITIAL ENCOUNTER: ICD-10-CM

## 2020-05-20 DIAGNOSIS — G89.4 CHRONIC PAIN SYNDROME: Primary | ICD-10-CM

## 2020-05-20 DIAGNOSIS — S32.040A COMPRESSION FRACTURE OF L4 VERTEBRA, INITIAL ENCOUNTER (HCC): ICD-10-CM

## 2020-05-20 PROCEDURE — 99214 OFFICE O/P EST MOD 30 MIN: CPT | Performed by: NURSE PRACTITIONER

## 2020-05-20 RX ORDER — OXYCODONE HYDROCHLORIDE AND ACETAMINOPHEN 5; 325 MG/1; MG/1
1 TABLET ORAL EVERY 6 HOURS PRN
Qty: 120 TABLET | Refills: 0 | Status: SHIPPED | OUTPATIENT
Start: 2020-05-20 | End: 2020-05-26

## 2020-05-20 RX ORDER — GABAPENTIN 100 MG/1
100 CAPSULE ORAL 3 TIMES DAILY
Qty: 90 CAPSULE | Refills: 1 | Status: SHIPPED | OUTPATIENT
Start: 2020-05-20 | End: 2020-05-26

## 2020-05-21 ENCOUNTER — EVALUATION (OUTPATIENT)
Dept: PHYSICAL THERAPY | Facility: CLINIC | Age: 85
End: 2020-05-21
Payer: MEDICARE

## 2020-05-21 DIAGNOSIS — R29.898 WEAKNESS OF BOTH LOWER EXTREMITIES: ICD-10-CM

## 2020-05-21 DIAGNOSIS — S32.040D OPEN WEDGE COMPRESSION FRACTURE OF FOURTH LUMBAR VERTEBRA WITH ROUTINE HEALING, SUBSEQUENT ENCOUNTER: Primary | ICD-10-CM

## 2020-05-21 PROCEDURE — 97162 PT EVAL MOD COMPLEX 30 MIN: CPT | Performed by: PHYSICAL THERAPIST

## 2020-05-21 PROCEDURE — 97014 ELECTRIC STIMULATION THERAPY: CPT | Performed by: PHYSICAL THERAPIST

## 2020-05-21 PROCEDURE — 97140 MANUAL THERAPY 1/> REGIONS: CPT | Performed by: PHYSICAL THERAPIST

## 2020-05-26 ENCOUNTER — TELEPHONE (OUTPATIENT)
Dept: INTERNAL MEDICINE CLINIC | Facility: CLINIC | Age: 85
End: 2020-05-26

## 2020-05-26 ENCOUNTER — OFFICE VISIT (OUTPATIENT)
Dept: INTERNAL MEDICINE CLINIC | Facility: CLINIC | Age: 85
End: 2020-05-26
Payer: MEDICARE

## 2020-05-26 VITALS
RESPIRATION RATE: 20 BRPM | SYSTOLIC BLOOD PRESSURE: 106 MMHG | WEIGHT: 124.8 LBS | OXYGEN SATURATION: 94 % | HEIGHT: 64 IN | HEART RATE: 86 BPM | BODY MASS INDEX: 21.31 KG/M2 | DIASTOLIC BLOOD PRESSURE: 68 MMHG | TEMPERATURE: 98.5 F

## 2020-05-26 DIAGNOSIS — S32.040A COMPRESSION FRACTURE OF L4 VERTEBRA, INITIAL ENCOUNTER (HCC): ICD-10-CM

## 2020-05-26 DIAGNOSIS — I48.91 ATRIAL FIBRILLATION WITH RAPID VENTRICULAR RESPONSE (HCC): ICD-10-CM

## 2020-05-26 DIAGNOSIS — Z00.00 MEDICARE ANNUAL WELLNESS VISIT, INITIAL: Primary | ICD-10-CM

## 2020-05-26 DIAGNOSIS — M54.16 LUMBAR RADICULOPATHY: ICD-10-CM

## 2020-05-26 DIAGNOSIS — S22.080A CLOSED WEDGE COMPRESSION FRACTURE OF TWELFTH THORACIC VERTEBRA, INITIAL ENCOUNTER: ICD-10-CM

## 2020-05-26 DIAGNOSIS — J96.11 CHRONIC RESPIRATORY FAILURE WITH HYPOXIA (HCC): Chronic | ICD-10-CM

## 2020-05-26 DIAGNOSIS — M54.40 CHRONIC LOW BACK PAIN WITH SCIATICA, SCIATICA LATERALITY UNSPECIFIED, UNSPECIFIED BACK PAIN LATERALITY: Chronic | ICD-10-CM

## 2020-05-26 DIAGNOSIS — G89.29 CHRONIC LOW BACK PAIN WITH SCIATICA, SCIATICA LATERALITY UNSPECIFIED, UNSPECIFIED BACK PAIN LATERALITY: Chronic | ICD-10-CM

## 2020-05-26 DIAGNOSIS — M81.0 OSTEOPOROSIS, UNSPECIFIED OSTEOPOROSIS TYPE, UNSPECIFIED PATHOLOGICAL FRACTURE PRESENCE: ICD-10-CM

## 2020-05-26 DIAGNOSIS — K21.9 GASTROESOPHAGEAL REFLUX DISEASE WITHOUT ESOPHAGITIS: ICD-10-CM

## 2020-05-26 PROCEDURE — 1036F TOBACCO NON-USER: CPT | Performed by: INTERNAL MEDICINE

## 2020-05-26 PROCEDURE — 1160F RVW MEDS BY RX/DR IN RCRD: CPT | Performed by: INTERNAL MEDICINE

## 2020-05-26 PROCEDURE — 3008F BODY MASS INDEX DOCD: CPT | Performed by: INTERNAL MEDICINE

## 2020-05-26 PROCEDURE — G0438 PPPS, INITIAL VISIT: HCPCS | Performed by: INTERNAL MEDICINE

## 2020-05-26 PROCEDURE — 99213 OFFICE O/P EST LOW 20 MIN: CPT | Performed by: INTERNAL MEDICINE

## 2020-05-26 PROCEDURE — 4040F PNEUMOC VAC/ADMIN/RCVD: CPT | Performed by: INTERNAL MEDICINE

## 2020-05-26 PROCEDURE — 1170F FXNL STATUS ASSESSED: CPT | Performed by: INTERNAL MEDICINE

## 2020-05-26 PROCEDURE — 1125F AMNT PAIN NOTED PAIN PRSNT: CPT | Performed by: INTERNAL MEDICINE

## 2020-05-26 RX ORDER — OXYCODONE HYDROCHLORIDE AND ACETAMINOPHEN 5; 325 MG/1; MG/1
1 TABLET ORAL EVERY 6 HOURS PRN
Qty: 120 TABLET | Refills: 0
Start: 2020-05-26 | End: 2020-05-28 | Stop reason: SDUPTHER

## 2020-05-27 ENCOUNTER — TELEPHONE (OUTPATIENT)
Dept: INTERNAL MEDICINE CLINIC | Facility: CLINIC | Age: 85
End: 2020-05-27

## 2020-05-27 ENCOUNTER — OFFICE VISIT (OUTPATIENT)
Dept: PHYSICAL THERAPY | Facility: CLINIC | Age: 85
End: 2020-05-27
Payer: MEDICARE

## 2020-05-27 DIAGNOSIS — S32.040D OPEN WEDGE COMPRESSION FRACTURE OF FOURTH LUMBAR VERTEBRA WITH ROUTINE HEALING, SUBSEQUENT ENCOUNTER: Primary | ICD-10-CM

## 2020-05-27 DIAGNOSIS — S22.080A CLOSED WEDGE COMPRESSION FRACTURE OF TWELFTH THORACIC VERTEBRA, INITIAL ENCOUNTER: ICD-10-CM

## 2020-05-27 DIAGNOSIS — M54.16 LUMBAR RADICULOPATHY: ICD-10-CM

## 2020-05-27 DIAGNOSIS — G89.29 CHRONIC LOW BACK PAIN WITH SCIATICA, SCIATICA LATERALITY UNSPECIFIED, UNSPECIFIED BACK PAIN LATERALITY: Chronic | ICD-10-CM

## 2020-05-27 DIAGNOSIS — M54.40 CHRONIC LOW BACK PAIN WITH SCIATICA, SCIATICA LATERALITY UNSPECIFIED, UNSPECIFIED BACK PAIN LATERALITY: Chronic | ICD-10-CM

## 2020-05-27 DIAGNOSIS — S32.040A COMPRESSION FRACTURE OF L4 VERTEBRA, INITIAL ENCOUNTER (HCC): ICD-10-CM

## 2020-05-27 PROCEDURE — 97032 APPL MODALITY 1+ESTIM EA 15: CPT

## 2020-05-27 PROCEDURE — 97140 MANUAL THERAPY 1/> REGIONS: CPT

## 2020-05-27 PROCEDURE — 97110 THERAPEUTIC EXERCISES: CPT

## 2020-05-27 RX ORDER — OXYCODONE HYDROCHLORIDE AND ACETAMINOPHEN 5; 325 MG/1; MG/1
1 TABLET ORAL EVERY 6 HOURS PRN
Qty: 120 TABLET | Refills: 0 | Status: CANCELLED | OUTPATIENT
Start: 2020-05-27 | End: 2020-06-26

## 2020-05-28 DIAGNOSIS — M54.40 CHRONIC LOW BACK PAIN WITH SCIATICA, SCIATICA LATERALITY UNSPECIFIED, UNSPECIFIED BACK PAIN LATERALITY: Chronic | ICD-10-CM

## 2020-05-28 DIAGNOSIS — G89.29 CHRONIC LOW BACK PAIN WITH SCIATICA, SCIATICA LATERALITY UNSPECIFIED, UNSPECIFIED BACK PAIN LATERALITY: Chronic | ICD-10-CM

## 2020-05-28 DIAGNOSIS — S32.040A COMPRESSION FRACTURE OF L4 VERTEBRA, INITIAL ENCOUNTER (HCC): ICD-10-CM

## 2020-05-28 DIAGNOSIS — M54.16 LUMBAR RADICULOPATHY: ICD-10-CM

## 2020-05-28 DIAGNOSIS — S22.080A CLOSED WEDGE COMPRESSION FRACTURE OF TWELFTH THORACIC VERTEBRA, INITIAL ENCOUNTER: ICD-10-CM

## 2020-05-28 RX ORDER — OXYCODONE HYDROCHLORIDE AND ACETAMINOPHEN 5; 325 MG/1; MG/1
1 TABLET ORAL EVERY 6 HOURS PRN
Qty: 120 TABLET | Refills: 0
Start: 2020-05-28 | End: 2020-05-28 | Stop reason: SDUPTHER

## 2020-05-28 RX ORDER — OXYCODONE HYDROCHLORIDE AND ACETAMINOPHEN 5; 325 MG/1; MG/1
1 TABLET ORAL EVERY 6 HOURS PRN
Qty: 120 TABLET | Refills: 0 | Status: SHIPPED | OUTPATIENT
Start: 2020-05-28 | End: 2020-06-27

## 2020-06-01 ENCOUNTER — OFFICE VISIT (OUTPATIENT)
Dept: PHYSICAL THERAPY | Facility: CLINIC | Age: 85
End: 2020-06-01
Payer: MEDICARE

## 2020-06-01 DIAGNOSIS — S32.040D OPEN WEDGE COMPRESSION FRACTURE OF FOURTH LUMBAR VERTEBRA WITH ROUTINE HEALING, SUBSEQUENT ENCOUNTER: Primary | ICD-10-CM

## 2020-06-01 DIAGNOSIS — R29.898 WEAKNESS OF BOTH LOWER EXTREMITIES: ICD-10-CM

## 2020-06-01 PROCEDURE — 97032 APPL MODALITY 1+ESTIM EA 15: CPT

## 2020-06-01 PROCEDURE — 97140 MANUAL THERAPY 1/> REGIONS: CPT

## 2020-06-01 PROCEDURE — 97110 THERAPEUTIC EXERCISES: CPT

## 2020-06-04 ENCOUNTER — OFFICE VISIT (OUTPATIENT)
Dept: PHYSICAL THERAPY | Facility: CLINIC | Age: 85
End: 2020-06-04
Payer: MEDICARE

## 2020-06-04 DIAGNOSIS — R29.898 WEAKNESS OF BOTH LOWER EXTREMITIES: ICD-10-CM

## 2020-06-04 DIAGNOSIS — S32.040D OPEN WEDGE COMPRESSION FRACTURE OF FOURTH LUMBAR VERTEBRA WITH ROUTINE HEALING, SUBSEQUENT ENCOUNTER: Primary | ICD-10-CM

## 2020-06-04 PROCEDURE — 97110 THERAPEUTIC EXERCISES: CPT

## 2020-06-04 PROCEDURE — 97140 MANUAL THERAPY 1/> REGIONS: CPT

## 2020-06-08 ENCOUNTER — HOSPITAL ENCOUNTER (OUTPATIENT)
Dept: BONE DENSITY | Facility: HOSPITAL | Age: 85
Discharge: HOME | End: 2020-06-08
Payer: MEDICARE

## 2020-06-08 DIAGNOSIS — M81.0 OSTEOPOROSIS, UNSPECIFIED OSTEOPOROSIS TYPE, UNSPECIFIED PATHOLOGICAL FRACTURE PRESENCE: ICD-10-CM

## 2020-06-08 PROCEDURE — 77080 DXA BONE DENSITY AXIAL: CPT

## 2020-06-09 ENCOUNTER — OFFICE VISIT (OUTPATIENT)
Dept: PHYSICAL THERAPY | Facility: CLINIC | Age: 85
End: 2020-06-09
Payer: MEDICARE

## 2020-06-09 DIAGNOSIS — S32.040D OPEN WEDGE COMPRESSION FRACTURE OF FOURTH LUMBAR VERTEBRA WITH ROUTINE HEALING, SUBSEQUENT ENCOUNTER: Primary | ICD-10-CM

## 2020-06-09 PROCEDURE — 97035 APP MDLTY 1+ULTRASOUND EA 15: CPT

## 2020-06-09 PROCEDURE — 97110 THERAPEUTIC EXERCISES: CPT

## 2020-06-09 PROCEDURE — 97140 MANUAL THERAPY 1/> REGIONS: CPT

## 2020-06-11 ENCOUNTER — OFFICE VISIT (OUTPATIENT)
Dept: PHYSICAL THERAPY | Facility: CLINIC | Age: 85
End: 2020-06-11
Payer: MEDICARE

## 2020-06-11 DIAGNOSIS — S32.040D OPEN WEDGE COMPRESSION FRACTURE OF FOURTH LUMBAR VERTEBRA WITH ROUTINE HEALING, SUBSEQUENT ENCOUNTER: Primary | ICD-10-CM

## 2020-06-11 PROCEDURE — 97140 MANUAL THERAPY 1/> REGIONS: CPT

## 2020-06-11 PROCEDURE — 97110 THERAPEUTIC EXERCISES: CPT

## 2020-06-11 PROCEDURE — 97035 APP MDLTY 1+ULTRASOUND EA 15: CPT

## 2020-06-12 ENCOUNTER — DOCUMENTATION (OUTPATIENT)
Dept: OTHER | Facility: HOSPITAL | Age: 85
End: 2020-06-12

## 2020-06-12 DIAGNOSIS — Z20.822 ENCOUNTER FOR LABORATORY TESTING FOR COVID-19 VIRUS: Primary | ICD-10-CM

## 2020-06-16 ENCOUNTER — OFFICE VISIT (OUTPATIENT)
Dept: PHYSICAL THERAPY | Facility: CLINIC | Age: 85
End: 2020-06-16
Payer: MEDICARE

## 2020-06-16 DIAGNOSIS — S32.040D OPEN WEDGE COMPRESSION FRACTURE OF FOURTH LUMBAR VERTEBRA WITH ROUTINE HEALING, SUBSEQUENT ENCOUNTER: Primary | ICD-10-CM

## 2020-06-16 PROCEDURE — 97110 THERAPEUTIC EXERCISES: CPT

## 2020-06-16 PROCEDURE — 97035 APP MDLTY 1+ULTRASOUND EA 15: CPT

## 2020-06-16 PROCEDURE — 97140 MANUAL THERAPY 1/> REGIONS: CPT

## 2020-06-17 DIAGNOSIS — Z20.822 ENCOUNTER FOR LABORATORY TESTING FOR COVID-19 VIRUS: ICD-10-CM

## 2020-06-17 PROCEDURE — U0003 INFECTIOUS AGENT DETECTION BY NUCLEIC ACID (DNA OR RNA); SEVERE ACUTE RESPIRATORY SYNDROME CORONAVIRUS 2 (SARS-COV-2) (CORONAVIRUS DISEASE [COVID-19]), AMPLIFIED PROBE TECHNIQUE, MAKING USE OF HIGH THROUGHPUT TECHNOLOGIES AS DESCRIBED BY CMS-2020-01-R: HCPCS

## 2020-06-18 ENCOUNTER — OFFICE VISIT (OUTPATIENT)
Dept: PHYSICAL THERAPY | Facility: CLINIC | Age: 85
End: 2020-06-18
Payer: MEDICARE

## 2020-06-18 DIAGNOSIS — S32.040D OPEN WEDGE COMPRESSION FRACTURE OF FOURTH LUMBAR VERTEBRA WITH ROUTINE HEALING, SUBSEQUENT ENCOUNTER: Primary | ICD-10-CM

## 2020-06-18 PROCEDURE — 97035 APP MDLTY 1+ULTRASOUND EA 15: CPT

## 2020-06-18 PROCEDURE — 97110 THERAPEUTIC EXERCISES: CPT

## 2020-06-18 PROCEDURE — 97140 MANUAL THERAPY 1/> REGIONS: CPT

## 2020-06-19 LAB — SARS-COV-2 RNA SPEC QL NAA+PROBE: NOT DETECTED

## 2020-06-22 ENCOUNTER — TELEPHONE (OUTPATIENT)
Dept: INTERNAL MEDICINE CLINIC | Facility: CLINIC | Age: 85
End: 2020-06-22

## 2020-06-23 ENCOUNTER — APPOINTMENT (OUTPATIENT)
Dept: PHYSICAL THERAPY | Facility: CLINIC | Age: 85
End: 2020-06-23
Payer: MEDICARE

## 2020-06-23 ENCOUNTER — OFFICE VISIT (OUTPATIENT)
Dept: INTERNAL MEDICINE CLINIC | Facility: CLINIC | Age: 85
End: 2020-06-23
Payer: MEDICARE

## 2020-06-23 VITALS
RESPIRATION RATE: 22 BRPM | WEIGHT: 125.2 LBS | DIASTOLIC BLOOD PRESSURE: 72 MMHG | TEMPERATURE: 97.3 F | OXYGEN SATURATION: 93 % | HEART RATE: 85 BPM | SYSTOLIC BLOOD PRESSURE: 110 MMHG | BODY MASS INDEX: 21.37 KG/M2 | HEIGHT: 64 IN

## 2020-06-23 DIAGNOSIS — J96.11 CHRONIC RESPIRATORY FAILURE WITH HYPOXIA (HCC): Primary | ICD-10-CM

## 2020-06-23 DIAGNOSIS — J41.0 SIMPLE CHRONIC BRONCHITIS (HCC): Chronic | ICD-10-CM

## 2020-06-23 PROCEDURE — 1170F FXNL STATUS ASSESSED: CPT | Performed by: INTERNAL MEDICINE

## 2020-06-23 PROCEDURE — 1036F TOBACCO NON-USER: CPT | Performed by: INTERNAL MEDICINE

## 2020-06-23 PROCEDURE — 3008F BODY MASS INDEX DOCD: CPT | Performed by: INTERNAL MEDICINE

## 2020-06-23 PROCEDURE — 1160F RVW MEDS BY RX/DR IN RCRD: CPT | Performed by: INTERNAL MEDICINE

## 2020-06-23 PROCEDURE — 99214 OFFICE O/P EST MOD 30 MIN: CPT | Performed by: INTERNAL MEDICINE

## 2020-06-23 PROCEDURE — 96372 THER/PROPH/DIAG INJ SC/IM: CPT | Performed by: INTERNAL MEDICINE

## 2020-06-23 PROCEDURE — A7003 NEBULIZER ADMINISTRATION SET: HCPCS | Performed by: INTERNAL MEDICINE

## 2020-06-23 PROCEDURE — 4040F PNEUMOC VAC/ADMIN/RCVD: CPT | Performed by: INTERNAL MEDICINE

## 2020-06-23 RX ORDER — DOXYCYCLINE HYCLATE 100 MG/1
100 CAPSULE ORAL EVERY 12 HOURS SCHEDULED
Qty: 14 CAPSULE | Refills: 0 | Status: SHIPPED | OUTPATIENT
Start: 2020-06-23 | End: 2020-06-30

## 2020-06-23 RX ORDER — IPRATROPIUM BROMIDE AND ALBUTEROL SULFATE 2.5; .5 MG/3ML; MG/3ML
3 SOLUTION RESPIRATORY (INHALATION)
Status: DISCONTINUED | OUTPATIENT
Start: 2020-06-23 | End: 2020-06-23

## 2020-06-23 RX ORDER — DEXAMETHASONE SODIUM PHOSPHATE 4 MG/ML
4 INJECTION, SOLUTION INTRA-ARTICULAR; INTRALESIONAL; INTRAMUSCULAR; INTRAVENOUS; SOFT TISSUE ONCE
Status: COMPLETED | OUTPATIENT
Start: 2020-06-23 | End: 2020-06-23

## 2020-06-23 RX ORDER — DEXAMETHASONE SODIUM PHOSPHATE 4 MG/ML
4 INJECTION, SOLUTION INTRA-ARTICULAR; INTRALESIONAL; INTRAMUSCULAR; INTRAVENOUS; SOFT TISSUE EVERY 6 HOURS SCHEDULED
Status: DISCONTINUED | OUTPATIENT
Start: 2020-06-23 | End: 2020-06-23

## 2020-06-23 RX ORDER — BUDESONIDE AND FORMOTEROL FUMARATE DIHYDRATE 160; 4.5 UG/1; UG/1
2 AEROSOL RESPIRATORY (INHALATION) 2 TIMES DAILY
Qty: 1 INHALER | Refills: 5 | Status: SHIPPED | OUTPATIENT
Start: 2020-06-23 | End: 2020-07-20 | Stop reason: SDUPTHER

## 2020-06-23 RX ORDER — PREDNISONE 1 MG/1
5 TABLET ORAL DAILY
Qty: 30 TABLET | Refills: 1 | Status: SHIPPED | OUTPATIENT
Start: 2020-06-23 | End: 2020-08-14 | Stop reason: SDUPTHER

## 2020-06-23 RX ORDER — IPRATROPIUM BROMIDE AND ALBUTEROL SULFATE 2.5; .5 MG/3ML; MG/3ML
3 SOLUTION RESPIRATORY (INHALATION) ONCE
Status: COMPLETED | OUTPATIENT
Start: 2020-06-23 | End: 2020-06-23

## 2020-06-23 RX ADMIN — IPRATROPIUM BROMIDE AND ALBUTEROL SULFATE 3 ML: 2.5; .5 SOLUTION RESPIRATORY (INHALATION) at 09:54

## 2020-06-23 RX ADMIN — DEXAMETHASONE SODIUM PHOSPHATE 4 MG: 4 INJECTION, SOLUTION INTRA-ARTICULAR; INTRALESIONAL; INTRAMUSCULAR; INTRAVENOUS; SOFT TISSUE at 09:47

## 2020-06-24 ENCOUNTER — ANESTHESIA EVENT (OUTPATIENT)
Dept: GASTROENTEROLOGY | Facility: HOSPITAL | Age: 85
End: 2020-06-24

## 2020-06-24 ENCOUNTER — ANESTHESIA (OUTPATIENT)
Dept: GASTROENTEROLOGY | Facility: HOSPITAL | Age: 85
End: 2020-06-24

## 2020-06-24 ENCOUNTER — HOSPITAL ENCOUNTER (OUTPATIENT)
Dept: GASTROENTEROLOGY | Facility: HOSPITAL | Age: 85
Setting detail: OUTPATIENT SURGERY
Discharge: HOME | End: 2020-06-24
Attending: INTERNAL MEDICINE | Admitting: INTERNAL MEDICINE
Payer: MEDICARE

## 2020-06-24 VITALS
RESPIRATION RATE: 24 BRPM | OXYGEN SATURATION: 95 % | HEART RATE: 73 BPM | DIASTOLIC BLOOD PRESSURE: 70 MMHG | BODY MASS INDEX: 24.54 KG/M2 | WEIGHT: 125 LBS | TEMPERATURE: 97 F | SYSTOLIC BLOOD PRESSURE: 133 MMHG | HEIGHT: 60 IN

## 2020-06-24 DIAGNOSIS — R11.2 NAUSEA WITH VOMITING, UNSPECIFIED: ICD-10-CM

## 2020-06-24 PROCEDURE — C1726 CATH, BAL DIL, NON-VASCULAR: HCPCS

## 2020-06-24 RX ORDER — ONDANSETRON 2 MG/ML
4 INJECTION INTRAMUSCULAR; INTRAVENOUS ONCE AS NEEDED
Status: DISCONTINUED | OUTPATIENT
Start: 2020-06-24 | End: 2020-06-24 | Stop reason: HOSPADM

## 2020-06-24 RX ORDER — SODIUM CHLORIDE, SODIUM LACTATE, POTASSIUM CHLORIDE, CALCIUM CHLORIDE 600; 310; 30; 20 MG/100ML; MG/100ML; MG/100ML; MG/100ML
50 INJECTION, SOLUTION INTRAVENOUS CONTINUOUS
Status: CANCELLED | OUTPATIENT
Start: 2020-06-24

## 2020-06-24 RX ORDER — SUCCINYLCHOLINE/SOD CL,ISO/PF 100 MG/5ML
SYRINGE (ML) INTRAVENOUS AS NEEDED
Status: DISCONTINUED | OUTPATIENT
Start: 2020-06-24 | End: 2020-06-24 | Stop reason: SURG

## 2020-06-24 RX ORDER — FENTANYL CITRATE/PF 50 MCG/ML
25 SYRINGE (ML) INJECTION
Status: DISCONTINUED | OUTPATIENT
Start: 2020-06-24 | End: 2020-06-24 | Stop reason: HOSPADM

## 2020-06-24 RX ORDER — ONDANSETRON 2 MG/ML
INJECTION INTRAMUSCULAR; INTRAVENOUS AS NEEDED
Status: DISCONTINUED | OUTPATIENT
Start: 2020-06-24 | End: 2020-06-24 | Stop reason: SURG

## 2020-06-24 RX ORDER — PROPOFOL 10 MG/ML
INJECTION, EMULSION INTRAVENOUS AS NEEDED
Status: DISCONTINUED | OUTPATIENT
Start: 2020-06-24 | End: 2020-06-24 | Stop reason: SURG

## 2020-06-24 RX ORDER — LIDOCAINE HYDROCHLORIDE 10 MG/ML
0.5 INJECTION, SOLUTION EPIDURAL; INFILTRATION; INTRACAUDAL; PERINEURAL ONCE AS NEEDED
Status: DISCONTINUED | OUTPATIENT
Start: 2020-06-24 | End: 2020-06-28 | Stop reason: HOSPADM

## 2020-06-24 RX ORDER — SODIUM CHLORIDE, SODIUM LACTATE, POTASSIUM CHLORIDE, CALCIUM CHLORIDE 600; 310; 30; 20 MG/100ML; MG/100ML; MG/100ML; MG/100ML
50 INJECTION, SOLUTION INTRAVENOUS CONTINUOUS
Status: DISCONTINUED | OUTPATIENT
Start: 2020-06-24 | End: 2020-06-28 | Stop reason: HOSPADM

## 2020-06-24 RX ADMIN — PROPOFOL 20 MG: 10 INJECTION, EMULSION INTRAVENOUS at 08:15

## 2020-06-24 RX ADMIN — PROPOFOL 100 MG: 10 INJECTION, EMULSION INTRAVENOUS at 08:04

## 2020-06-24 RX ADMIN — Medication 100 MG: at 08:04

## 2020-06-24 RX ADMIN — ONDANSETRON 4 MG: 2 INJECTION INTRAMUSCULAR; INTRAVENOUS at 08:11

## 2020-06-24 RX ADMIN — SODIUM CHLORIDE, SODIUM LACTATE, POTASSIUM CHLORIDE, AND CALCIUM CHLORIDE: .6; .31; .03; .02 INJECTION, SOLUTION INTRAVENOUS at 07:16

## 2020-06-24 RX ADMIN — PROPOFOL 20 MG: 10 INJECTION, EMULSION INTRAVENOUS at 08:11

## 2020-06-26 ENCOUNTER — EVALUATION (OUTPATIENT)
Dept: PHYSICAL THERAPY | Facility: CLINIC | Age: 85
End: 2020-06-26
Payer: MEDICARE

## 2020-06-26 DIAGNOSIS — R29.898 WEAKNESS OF BOTH LOWER EXTREMITIES: ICD-10-CM

## 2020-06-26 DIAGNOSIS — S32.040D OPEN WEDGE COMPRESSION FRACTURE OF FOURTH LUMBAR VERTEBRA WITH ROUTINE HEALING, SUBSEQUENT ENCOUNTER: Primary | ICD-10-CM

## 2020-06-26 PROCEDURE — 97140 MANUAL THERAPY 1/> REGIONS: CPT | Performed by: PHYSICAL THERAPIST

## 2020-06-26 PROCEDURE — 97110 THERAPEUTIC EXERCISES: CPT | Performed by: PHYSICAL THERAPIST

## 2020-06-29 ENCOUNTER — OFFICE VISIT (OUTPATIENT)
Dept: INTERNAL MEDICINE CLINIC | Facility: CLINIC | Age: 85
End: 2020-06-29
Payer: MEDICARE

## 2020-06-29 ENCOUNTER — OFFICE VISIT (OUTPATIENT)
Dept: PAIN MEDICINE | Facility: CLINIC | Age: 85
End: 2020-06-29
Payer: MEDICARE

## 2020-06-29 VITALS
HEIGHT: 60 IN | BODY MASS INDEX: 24.07 KG/M2 | DIASTOLIC BLOOD PRESSURE: 70 MMHG | WEIGHT: 122.6 LBS | TEMPERATURE: 98.1 F | SYSTOLIC BLOOD PRESSURE: 114 MMHG

## 2020-06-29 VITALS
HEIGHT: 60 IN | TEMPERATURE: 97.1 F | BODY MASS INDEX: 24 KG/M2 | DIASTOLIC BLOOD PRESSURE: 70 MMHG | WEIGHT: 122.25 LBS | SYSTOLIC BLOOD PRESSURE: 114 MMHG | HEART RATE: 65 BPM | OXYGEN SATURATION: 95 %

## 2020-06-29 DIAGNOSIS — I48.0 PAROXYSMAL ATRIAL FIBRILLATION (HCC): ICD-10-CM

## 2020-06-29 DIAGNOSIS — G89.29 CHRONIC RIGHT-SIDED LOW BACK PAIN WITHOUT SCIATICA: ICD-10-CM

## 2020-06-29 DIAGNOSIS — K25.7 CHRONIC PEPTIC ULCER OF STOMACH: ICD-10-CM

## 2020-06-29 DIAGNOSIS — I48.91 ATRIAL FIBRILLATION WITH RAPID VENTRICULAR RESPONSE (HCC): ICD-10-CM

## 2020-06-29 DIAGNOSIS — M54.50 CHRONIC RIGHT-SIDED LOW BACK PAIN WITHOUT SCIATICA: ICD-10-CM

## 2020-06-29 DIAGNOSIS — J41.0 SIMPLE CHRONIC BRONCHITIS (HCC): Chronic | ICD-10-CM

## 2020-06-29 DIAGNOSIS — G89.4 CHRONIC PAIN SYNDROME: Primary | ICD-10-CM

## 2020-06-29 DIAGNOSIS — K21.9 GASTROESOPHAGEAL REFLUX DISEASE WITHOUT ESOPHAGITIS: Primary | ICD-10-CM

## 2020-06-29 DIAGNOSIS — M46.1 SACROILIITIS (HCC): ICD-10-CM

## 2020-06-29 DIAGNOSIS — S32.040D COMPRESSION FRACTURE OF L4 VERTEBRA WITH ROUTINE HEALING, SUBSEQUENT ENCOUNTER: ICD-10-CM

## 2020-06-29 PROCEDURE — 3008F BODY MASS INDEX DOCD: CPT | Performed by: INTERNAL MEDICINE

## 2020-06-29 PROCEDURE — 1036F TOBACCO NON-USER: CPT | Performed by: INTERNAL MEDICINE

## 2020-06-29 PROCEDURE — 4040F PNEUMOC VAC/ADMIN/RCVD: CPT | Performed by: NURSE PRACTITIONER

## 2020-06-29 PROCEDURE — 1160F RVW MEDS BY RX/DR IN RCRD: CPT | Performed by: NURSE PRACTITIONER

## 2020-06-29 PROCEDURE — 1160F RVW MEDS BY RX/DR IN RCRD: CPT | Performed by: INTERNAL MEDICINE

## 2020-06-29 PROCEDURE — 1036F TOBACCO NON-USER: CPT | Performed by: NURSE PRACTITIONER

## 2020-06-29 PROCEDURE — 99213 OFFICE O/P EST LOW 20 MIN: CPT | Performed by: NURSE PRACTITIONER

## 2020-06-29 PROCEDURE — 99214 OFFICE O/P EST MOD 30 MIN: CPT | Performed by: INTERNAL MEDICINE

## 2020-06-29 PROCEDURE — 3008F BODY MASS INDEX DOCD: CPT | Performed by: NURSE PRACTITIONER

## 2020-06-29 PROCEDURE — 4040F PNEUMOC VAC/ADMIN/RCVD: CPT | Performed by: INTERNAL MEDICINE

## 2020-06-29 RX ORDER — PANTOPRAZOLE SODIUM 40 MG/1
40 TABLET, DELAYED RELEASE ORAL
Qty: 90 TABLET | Refills: 3 | Status: SHIPPED | OUTPATIENT
Start: 2020-06-29 | End: 2020-09-04 | Stop reason: SDUPTHER

## 2020-06-29 RX ORDER — OXYCODONE HYDROCHLORIDE AND ACETAMINOPHEN 5; 325 MG/1; MG/1
1 TABLET ORAL EVERY 6 HOURS PRN
Qty: 120 TABLET | Refills: 0 | Status: SHIPPED | OUTPATIENT
Start: 2020-06-29 | End: 2020-06-29 | Stop reason: SDUPTHER

## 2020-06-29 RX ORDER — ALBUTEROL SULFATE 90 UG/1
2 AEROSOL, METERED RESPIRATORY (INHALATION) EVERY 6 HOURS PRN
Qty: 1 INHALER | Refills: 5 | Status: SHIPPED | OUTPATIENT
Start: 2020-06-29 | End: 2020-12-26

## 2020-06-29 RX ORDER — OXYCODONE HYDROCHLORIDE AND ACETAMINOPHEN 5; 325 MG/1; MG/1
1 TABLET ORAL EVERY 6 HOURS PRN
Qty: 120 TABLET | Refills: 0 | Status: SHIPPED | OUTPATIENT
Start: 2020-06-29 | End: 2020-08-24 | Stop reason: SDUPTHER

## 2020-06-29 RX ORDER — GABAPENTIN 100 MG/1
100 CAPSULE ORAL 3 TIMES DAILY
Qty: 90 CAPSULE | Refills: 2 | Status: SHIPPED | OUTPATIENT
Start: 2020-06-29 | End: 2020-08-20 | Stop reason: SDUPTHER

## 2020-06-30 ENCOUNTER — OFFICE VISIT (OUTPATIENT)
Dept: PHYSICAL THERAPY | Facility: CLINIC | Age: 85
End: 2020-06-30
Payer: MEDICARE

## 2020-06-30 DIAGNOSIS — S32.040D OPEN WEDGE COMPRESSION FRACTURE OF FOURTH LUMBAR VERTEBRA WITH ROUTINE HEALING, SUBSEQUENT ENCOUNTER: Primary | ICD-10-CM

## 2020-06-30 PROCEDURE — 97110 THERAPEUTIC EXERCISES: CPT

## 2020-06-30 PROCEDURE — 97140 MANUAL THERAPY 1/> REGIONS: CPT

## 2020-07-02 ENCOUNTER — OFFICE VISIT (OUTPATIENT)
Dept: PHYSICAL THERAPY | Facility: CLINIC | Age: 85
End: 2020-07-02
Payer: MEDICARE

## 2020-07-02 ENCOUNTER — OFFICE VISIT (OUTPATIENT)
Dept: CARDIOLOGY CLINIC | Facility: CLINIC | Age: 85
End: 2020-07-02
Payer: MEDICARE

## 2020-07-02 VITALS
BODY MASS INDEX: 24.35 KG/M2 | DIASTOLIC BLOOD PRESSURE: 66 MMHG | HEIGHT: 60 IN | SYSTOLIC BLOOD PRESSURE: 110 MMHG | HEART RATE: 72 BPM | WEIGHT: 124 LBS

## 2020-07-02 DIAGNOSIS — I48.0 PAROXYSMAL ATRIAL FIBRILLATION (HCC): Primary | ICD-10-CM

## 2020-07-02 DIAGNOSIS — J41.0 SIMPLE CHRONIC BRONCHITIS (HCC): ICD-10-CM

## 2020-07-02 DIAGNOSIS — S32.040D OPEN WEDGE COMPRESSION FRACTURE OF FOURTH LUMBAR VERTEBRA WITH ROUTINE HEALING, SUBSEQUENT ENCOUNTER: Primary | ICD-10-CM

## 2020-07-02 DIAGNOSIS — Z87.891 FORMER SMOKER: ICD-10-CM

## 2020-07-02 PROCEDURE — 1160F RVW MEDS BY RX/DR IN RCRD: CPT | Performed by: INTERNAL MEDICINE

## 2020-07-02 PROCEDURE — 99213 OFFICE O/P EST LOW 20 MIN: CPT | Performed by: INTERNAL MEDICINE

## 2020-07-02 PROCEDURE — 97110 THERAPEUTIC EXERCISES: CPT

## 2020-07-02 PROCEDURE — 4040F PNEUMOC VAC/ADMIN/RCVD: CPT | Performed by: INTERNAL MEDICINE

## 2020-07-02 PROCEDURE — 3008F BODY MASS INDEX DOCD: CPT | Performed by: INTERNAL MEDICINE

## 2020-07-02 PROCEDURE — 1036F TOBACCO NON-USER: CPT | Performed by: INTERNAL MEDICINE

## 2020-07-02 PROCEDURE — 97140 MANUAL THERAPY 1/> REGIONS: CPT

## 2020-07-02 NOTE — PROGRESS NOTES
[facility]  Asad Pacheco, [address]   [address] [facility], [address]   [phone]                                              Cardiology Office Follow up  Jessica Colin, 82 y o  female  YOB: 1935  MRN: [id number] Encounter: [id number]      PCP - Ling Griffin, DO    Assessment  1  Paroxysmal Atrial Fibrillation   · Echo - 10/7/19 -   LVEF 60%, mild LVH, grade 1 diastolic dysfunction, mild AI  2  Former smoker, smoked 45 pack-years  3  Gastric outlet obstruction   · S/p antrectomy with bilroth II (2/2020)  · Successful removal of food impaction with dilatation fo GJ anastomosis (3/19/2020)  · s/p repeat EGD-dilation (6/15/2020)  4  COPD on home O2 PRN    Plan  Paroxysmal Atrial Fibrillation  · Afib was new during initial hospitalization in 10/2019 for diarrhea, and occurred for short periods in the acute setting  · Remains in NSR on clinical exam & ECG in 3/2020  · Continue metoprolol 12 5 bid, eliquis 5 bid  · Diltiazem 240 was discontinued at some point in 4/2020, for unclear reasons  · Monitor for now  · Overall doing well from cardiac standpoint    Follow up in 6 months    History of Present Illness   82-year-old female with a history of COPD, former smoker, comes in as a new patient for establishment of care regarding her paroxysmal atrial fibrillation  She initially presented to the hospital with complaints of elbow pain, after being and successfully treated outpatient for olecranon bursitis  At time of presentation, she was found to be in rapid atrial fibrillation, and was placed on IV Cardizem & eliquis  She had several ED visits, and subsequently had an extended hospitalization for diarrhea  She had sinus with frequent PACs at most times, with brief periods of atrial fibrillation  Her medications were up titrated to Cardizem CD 2 40 mg, metoprolol 12 5 mg b i d , with improvement in symptoms  She was eventually discharged to [facility]  She was discharged to home in late November 2019, and is coming for initial outpatient cardiology consultation     Since discharge, she has been doing well, reports improvement in her diarrhea  She has no active complains of palpitations, chest pain, shortness of breath, dizziness or lightheadedness  She plans to return back to work as a  over the next few weeks    Interval history - 7/2/2020  She comes back for follow-up regarding her atrial fibrillation  She had problems with more nausea and food impaction and was diagnosed with gastric outlet obstruction  She underwent antrectomy, and subsequent to endoscopic dilations, and feels much better at present  His able to tolerate food  No further symptoms of palpitations or atrial fibrillation  No complains of chest pain or shortness of breath  Historical Information   Past Medical History:   Diagnosis Date    Asthma     Atrial fibrillation     Blurred vision     Cardiac disease     Colitis     COPD (chronic obstructive pulmonary disease)     Diverticulosis     DJD (degenerative joint disease)     Emphysema lung     Gait abnormality     Hypertension     Shortness of breath      Past Surgical History:   Procedure Laterality Date    BLADDER SURGERY      COLON SURGERY      COLONOSCOPY      COLONOSCOPY W/ POLYPECTOMY N/A 1/21/2019    Procedure: COLONOSCOPY W/ POLYPECTOMY;  Surgeon: Lucila Rose MD;  Location: [address] [place] [address] GI LAB; Service: General    GASTROJEJUNOSTOMY W/ JEJUNOSTOMY TUBE N/A 2/3/2020    Procedure: Antrectomy with bilroth II Anastomosis;   Surgeon: Ambreen Kothari MD;  Location: [facility];  Service: General    SMALL INTESTINE SURGERY  03/2020    with partial gastrectomy     Family History   Problem Relation Age of Onset    Heart disease Mother      Current Outpatient Medications on File Prior to Visit   Medication Sig Dispense Refill    acetaminophen (TYLENOL) 325 mg tablet Take 2 tablets (650 mg total) by mouth every 6 (six) hours as needed for mild pain, headaches or fever 30 tablet 0    albuterol (Ventolin HFA) 90 mcg/act inhaler Inhale 2 puffs every 6 (six) hours as needed for wheezing 1 Inhaler 5    alendronate (FOSAMAX) 70 mg tablet Take by mouth every 7 days   0    apixaban (ELIQUIS) 5 mg Take 1 tablet (5 mg total) by mouth 2 (two) times a day 60 tablet 5    Artificial Tear Solution (GENTEAL TEARS) 0 1-0 2-0 3 % SOLN Administer 1 drop to both eyes 3 (three) times a day      budesonide-formoterol (SYMBICORT) 160-4 5 mcg/act inhaler Inhale 2 puffs 2 (two) times a day Rinse mouth after use   1 Inhaler 5    Calcium Carb-Cholecalciferol (CALCIUM 1000 + D PO) Take 1,000 mg by mouth daily      fluticasone-umeclidinium-vilanterol (Trelegy Ellipta) 100-62 5-25 MCG/INH inhaler Inhale 1 puff daily 1 Inhaler 5    gabapentin (NEURONTIN) 100 mg capsule Take 1 capsule (100 mg total) by mouth 3 (three) times a day 90 capsule 2    ipratropium-albuterol (DUO-NEB) 0 5-2 5 mg/3 mL nebulizer solution Take 1 vial (3 mL total) by nebulization every 6 (six) hours while awake 300 mL 0    iron polysaccharides (FERREX) 150 mg capsule Take 1 capsule (150 mg total) by mouth 2 (two) times a day 60 capsule 1    metoprolol tartrate (LOPRESSOR) 25 mg tablet Take 0 5 tablets (12 5 mg total) by mouth every 12 (twelve) hours 90 tablet 0    montelukast (SINGULAIR) 10 mg tablet Take 1 tablet (10 mg total) by mouth daily at bedtime 90 tablet 1    oxyCODONE-acetaminophen (PERCOCET) 5-325 mg per tablet Take 1 tablet by mouth every 6 (six) hours as needed for moderate pain Do not fill until 07/27/2020Max Daily Amount: 4 tablets 120 tablet 0    pantoprazole (PROTONIX) 40 mg tablet Take 1 tablet (40 mg total) by mouth daily before breakfast 90 tablet 3    predniSONE 5 mg tablet Take 1 tablet (5 mg total) by mouth daily 30 tablet 1    psyllium (METAMUCIL) packet Take 1 packet by mouth 3 (three) times a day 100 packet 1    simethicone (MYLICON) 80 mg chewable tablet Chew 1 tablet (80 mg total) every 6 (six) hours as needed for flatulence 30 tablet 0    tiotropium (Spiriva HandiHaler) 18 mcg inhalation capsule Place 1 capsule (18 mcg total) into inhaler and inhale daily 30 capsule 5     No current facility-administered medications on file prior to visit  Allergies   Allergen Reactions    Bee Venom     Fluticasone      Per pt  error     Social History     Socioeconomic History    Marital status:      Spouse name: None    Number of children: None    Years of education: None    Highest education level: None   Occupational History    Occupation: retired   Social Needs    Financial resource strain: None    Food insecurity:     Worry: None     Inability: None    Transportation needs:     Medical: None     Non-medical: None   Tobacco Use    Smoking status: Former Smoker     Last attempt to quit: 2013     Years since quittin 6    Smokeless tobacco: Never Used   Substance and Sexual Activity    Alcohol use: Not Currently     Frequency: Never     Binge frequency: Never    Drug use: Not Currently    Sexual activity: Not Currently   Lifestyle    Physical activity:     Days per week: None     Minutes per session: None    Stress: None   Relationships    Social connections:     Talks on phone: None     Gets together: None     Attends Holiness service: None     Active member of club or organization: None     Attends meetings of clubs or organizations: None     Relationship status: None    Intimate partner violence:     Fear of current or ex partner: None     Emotionally abused: None     Physically abused: None     Forced sexual activity: None   Other Topics Concern    None   Social History Narrative    Retired        Daily caffeine use- 2 cups of coffee        Review of Systems   All other systems reviewed and are negative        Vitals:  Vitals:    20 1316   BP: 110/66   Pulse: 72   Weight: 56 2 kg (124 lb)   Height: 5' (1 524 m)     BMI - Body mass index is 24 22 kg/m²  Wt Readings from Last 7 Encounters:   07/02/20 56 2 kg (124 lb)   07/01/20 56 2 kg (124 lb)   06/29/20 55 6 kg (122 lb 9 6 oz)   06/29/20 55 5 kg (122 lb 4 oz)   06/24/20 56 7 kg (125 lb)   06/23/20 56 8 kg (125 lb 3 2 oz)   05/26/20 56 6 kg (124 lb 12 8 oz)       Physical Exam   Constitutional: She is oriented to person, place, and time  She appears well-developed and well-nourished  No distress  HENT:   Head: Normocephalic and atraumatic  Eyes: Conjunctivae are normal  No scleral icterus  Neck: No JVD present  Cardiovascular: Normal rate, regular rhythm and normal heart sounds  Exam reveals no gallop and no friction rub  No murmur heard  Pulmonary/Chest: Effort normal and breath sounds normal  No respiratory distress  She has no rales  Abdominal: Soft  She exhibits no distension  There is no tenderness  Healing central abdominal scar, no redness or tenderness   Musculoskeletal: She exhibits no edema  Neurological: She is alert and oriented to person, place, and time  Skin: Skin is warm  Psychiatric: She has a normal mood and affect  Her behavior is normal    Nursing note and vitals reviewed            Labs:  CBC:   Lab Results   Component Value Date    WBC 8 50 03/18/2020    RBC 4 30 03/18/2020    HGB 11 1 (L) 03/18/2020    HCT 35 0 (L) 03/18/2020    MCV 82 03/18/2020     03/18/2020    RDW 16 7 (H) 03/18/2020       CMP:   Lab Results   Component Value Date     01/08/2016    K 3 8 03/22/2020     03/22/2020    CO2 30 03/22/2020    ANIONGAP 13 01/08/2016    BUN <2 (L) 03/22/2020    CREATININE 0 37 (L) 03/22/2020    EGFR 100 03/22/2020    GLUCOSE 77 01/08/2016    CALCIUM 8 7 03/22/2020    AST 18 03/21/2020    ALT 30 03/21/2020    ALKPHOS 204 (H) 03/21/2020    PROT 7 1 01/08/2016    BILITOT 0 23 01/08/2016       Magnesium:  Lab Results   Component Value Date    MG 2 2 02/09/2020       Lipid Profile:   Lab Results   Component Value Date    CHOL 229 2016    HDL 42 2016    TRIG 204 (H) 2020    LDLCALC 171 (H) 2016       Thyroid Studies:   Lab Results   Component Value Date    UOA8XPOGLTLC 2 620 10/02/2019       No components found for: [place]    Lab Results   Component Value Date    INR 1 09 2020    INR 1 34 10/11/2019    INR 1 05 2018   5    Imaging: No results found  Cardiac testing:   Results for orders placed during the hospital encounter of 10/06/19   Echo complete with contrast if indicated    Narrative [facility] [address]    Transthoracic Echocardiogram  2D, M-mode, Doppler, and Color Doppler    Study date:  07-Oct-2019    Patient: Chris Pierce  MR number: [id number]  Account number: [de-identified]  : 1935  Age: 82 years  Gender: Female  Status: Inpatient  Location: [facility]   Height: 60 in  Weight: 164 lb  BP: 88/ 53 mmHg    Indications: Afib    Diagnoses: I48 0 - Atrial fibrillation    Sonographer:  Michelle Garcia RDCS  Referring Physician:  Gerard Majano MD  Group:  [facility]  Interpreting Physician:  Lou Landrum MD    SUMMARY    LEFT VENTRICLE:  Systolic function was normal  Ejection fraction was estimated to be 60 %  There were no regional wall motion abnormalities  There was mild concentric hypertrophy  Doppler parameters were consistent with abnormal left ventricular relaxation (grade 1 diastolic dysfunction)  MITRAL VALVE:  There was mild annular calcification  There was trace regurgitation  AORTIC VALVE:  There was mild regurgitation  TRICUSPID VALVE:  There was trace regurgitation  HISTORY: PRIOR HISTORY: Congestive heart failure  Atrial fibrillation  Chronic lung disease  PROCEDURE: The study was performed in the [facility]  This was a routine study  The transthoracic approach was used   The study included complete 2D imaging, M-mode, complete spectral Doppler, and color Doppler  The  heart rate was 92 bpm, at the start of the study  Images were obtained from the parasternal, apical, subcostal, and suprasternal notch acoustic windows  Echocardiographic views were limited due to poor acoustic window availability,  decreased penetration, and lung interference  This was a technically difficult study  LEFT VENTRICLE: Size was normal  Systolic function was normal  Ejection fraction was estimated to be 60 %  There were no regional wall motion abnormalities  Wall thickness was mildly increased  There was mild concentric hypertrophy  DOPPLER: Doppler parameters were consistent with abnormal left ventricular relaxation (grade 1 diastolic dysfunction)  RIGHT VENTRICLE: The size was normal  Systolic function was normal  Wall thickness was normal     LEFT ATRIUM: Size was normal     RIGHT ATRIUM: Size was normal     MITRAL VALVE: There was mild annular calcification  Valve structure was normal  There was normal leaflet separation  DOPPLER: The transmitral velocity was within the normal range  There was no evidence for stenosis  There was trace  regurgitation  AORTIC VALVE: The valve was not well visualized  DOPPLER: Transaortic velocity was within the normal range  There was no evidence for stenosis  There was mild regurgitation  TRICUSPID VALVE: The valve structure was normal  There was normal leaflet separation  DOPPLER: The transtricuspid velocity was within the normal range  There was no evidence for stenosis  There was trace regurgitation  Pulmonary artery  systolic pressure was moderately increased  Estimated peak PA pressure was 65 mmHg  The findings suggest moderate pulmonary hypertension  PULMONIC VALVE: Not well visualized  DOPPLER: The transpulmonic velocity was within the normal range  There was no significant regurgitation  PERICARDIUM: There was no pericardial effusion   The pericardium was normal in appearance  AORTA: The root exhibited normal size  SYSTEMIC VEINS: IVC: The inferior vena cava was upper normal     SYSTEM MEASUREMENT TABLES    2D  %FS: 30 38 %  AV Diam: 3 11 cm  EDV(Teich): 61 91 ml  EF(Teich): 58 53 %  ESV(Teich): 25 68 ml  IVSd: 1 15 cm  LA Area: 16 7 cm2  LA Diam: 3 58 cm  LVEDV MOD A4C: 81 9 ml  LVEF MOD A4C: 56 03 %  LVESV MOD A4C: 36 01 ml  LVIDd: 3 8 cm  LVIDs: 2 65 cm  LVLd A4C: 6 83 cm  LVLs A4C: 5 86 cm  LVPWd: 1 12 cm  RA Area: 14 56 cm2  RV Diam : 3 27 cm  SV MOD A4C: 45 89 ml  SV(Cube): 36 32 ml  SV(Teich): 36 24 ml    CW  AR Dec [place]: 1 94 m/s2  AR Dec Time: 1840 69 ms  AR PHT: 533 8 ms  AR Vmax: 3 58 m/s  AR maxP 22 mmHg  TR Vmax: 3 49 m/s  TR maxP 16 mmHg    MM  TAPSE: 2 16 cm    PW  E': 0 07 m/s  E/E': 13 57  MV A [place]: 1 44 m/s  MV Dec [place]: 2 9 m/s2  MV DecT: 337 71 ms  MV E Rodrig[place]: 0 98 m/s  MV E/A Ratio: 0 68    [employer] Accredited Echocardiography Laboratory    Prepared and electronically signed by    Brennen Márquez MD  Signed 07-Oct-2019 21:51:59       No results found for this or any previous visit  No results found for this or any previous visit  No results found for this or any previous visit

## 2020-07-02 NOTE — PROGRESS NOTES
Daily Note     Today's date: 2020  Patient name: Sotero Gudino  : 1935  MRN: [id number]  Referring provider: LEEROY Quintero  Dx:   Encounter Diagnosis     ICD-10-CM    1  Open wedge compression fracture of fourth lumbar vertebra with routine healing, subsequent encounter S32 040D        Start Time: 0900  Stop Time: 1000  Total time in clinic (min): 60 minutes    Subjective: Pt  states she's exhausted from all the medical appts she's had so far this week  Says also the humidity is affecting her breathing again today  Objective: See treatment diary below      Assessment: Tolerated treatment only Fair with performance of exercises today due to breathing issues  Received MHP and STM well  Plan: Con't services 2x/week  Precautions: Falls, lumbar compression fx, low vision, [place]  Re-eval Date: 2020    Date 7  2 20 6 16 20 6 18 20  6 30 20   Visit Count 11 7 8 9 10   FOTO  **NV **Completed     Pain In R SI -4-/10 6/10 L SI-1/10  R SI -5/10  "more soreness than pain" See RE L SI-1/10  R SI -10  "Pressure" pain   Pain Out Less  Tightness/pressure -5/10 "Much better" See RE "Only a little bit of pain now, Better"         Manuals: STM to bilateral L/S in seated position, leaning onto plinthe table, with support of  Pillow for UE's   HS stretch, heel cord stretch B/L to tolerance  Sidelying quad stretch B/L to tolerance  Manuals 7  2 20 6 16 20 6 18 20  6 30 20    STM  20 min STM  20 min STM  20 min 45 mins STM  20 min   Ham stretch B 4x/20-30"   B 3x/20-30"  Light, gentle **HELD today due to time  B 4x/20-30"     Groin B 5x/20-30"   B 3x/20-30"  Light, gentle **HELD today due to time  B 5x/20-30"   Hip Flexors B 3x30"  Light, gentle B 3x30" **HELD today due to time  HOLD   Quad Stretch B 4x/20"-30" sidelying    **B 4x/20"-30" sidelying   Heel cord stertch     **B 5x/20-30"   Manual Stretch Time 25 min    20 min                                           Ther Ex 7  2 20 6 16 20 6  1 20 Unable to tolerate this date 2/2 resp difficulty 6 30 20   LE strenghtening NuStep  L1 x 8 min    W/MHP applic  Skin intact pre/pos  **extra toweling applied NuStep  L1 x 8 min    W/MHP applic  Skin intact pre/pos  **extra toweling NuStep  L2 x 9 min  NuStep  L2 x 8 mdin   Abdominal stability program     Hold  *HOLD ALL abdom related ther ex due to soreness @ abdom region*   *        SKTC B 3x/10" B 3x/10" B 3x/10" resume HOLD   DKTC B 3x/10" B 3x/10" B 3x/10" resume HOLD   LTR side -side   15x/3" side -side   10x/3" side -side   10x/3" resume side -side   15x/5"   Alt UE with AH **1x10  light AH   Trial seated on disc NV *not introduced today due to abdom issues  Alt LEs with AH **1x10  light AH   Trial seated on disc NV *not introduced today due to abdom issues  Anti-trunk rotation resume   Seated firm, no posterior support NV *not introduced today due to abdom issues  MTP/LTP resume   Stand, Firm  With HA   *not introduced today due to abdom issues     SLR x 3 B 1x10 ea   Stand, Firm  With HA   **B 1x10 ea   HR/TR B 30x   Stand, Firm  With HA   **B 30x           Ther Activity        Lifting body mechanics    Floor to waist HOLD       Waist to shoulder   HOLD   Gait Training                        Modalities        IFC with MH MHP applied to L/S while on NuStep  **extra toweling applied         ___________    ___________ Prn  MH x 15 mins seated to L/S MHP applied to L/S while on NuStep  **extra toweling applied   Ultra Sound _________ B SI  X 10 min B SI  X 10 min Discharge _________

## 2020-07-06 ENCOUNTER — TELEPHONE (OUTPATIENT)
Dept: PAIN MEDICINE | Facility: CLINIC | Age: 85
End: 2020-07-06

## 2020-07-06 NOTE — TELEPHONE ENCOUNTER
Patient called requesting to speak to Rahat Freedman in reference to surgery  She would like further information about her options   Please advise, david    Call back# [phone]

## 2020-07-07 ENCOUNTER — OFFICE VISIT (OUTPATIENT)
Dept: PHYSICAL THERAPY | Facility: CLINIC | Age: 85
End: 2020-07-07
Payer: MEDICARE

## 2020-07-07 DIAGNOSIS — R29.898 WEAKNESS OF BOTH LOWER EXTREMITIES: ICD-10-CM

## 2020-07-07 DIAGNOSIS — S32.040D OPEN WEDGE COMPRESSION FRACTURE OF FOURTH LUMBAR VERTEBRA WITH ROUTINE HEALING, SUBSEQUENT ENCOUNTER: Primary | ICD-10-CM

## 2020-07-07 PROCEDURE — 97110 THERAPEUTIC EXERCISES: CPT | Performed by: PHYSICAL THERAPIST

## 2020-07-07 PROCEDURE — 97140 MANUAL THERAPY 1/> REGIONS: CPT | Performed by: PHYSICAL THERAPIST

## 2020-07-07 NOTE — PROGRESS NOTES
Daily Note     Today's date: 2020  Patient name: Jadyn Small  : 1935  MRN: [id number]  Referring provider: LEEROY Ng  Dx:   Encounter Diagnosis     ICD-10-CM    1  Open wedge compression fracture of fourth lumbar vertebra with routine healing, subsequent encounter S32 040D    2  Weakness of both lower extremities R29 [id number]                   Subjective: Pt reporting that she can't breath with the mask on today because her allergies are acting up  Objective: See treatment diary below      Assessment: Pt with marked labored breathing today during exertion  Frequent verbal and tactile cues provided for correct form with TE program  Soft tissue restrictions and trigger points noted in L/S paraspinals with STM R > L side; pt reporting mild relief of symptoms following manual therapy  Plan: Continue per plan of care  Precautions: Falls, lumbar compression fx, low vision, [place]  Re-eval Date: 2020    Date 7   6 18 20  6 30 20   Visit Count 11 12 8 9 10   FOTO  **NV **Completed     Pain In R SI -4-5/10 3-4/10 L SI-110  R SI -5/10  "more soreness than pain" See RE L SI-1/10  R SI -4/10  "Pressure" pain   Pain Out Less  Tightness/pressure  "Much better" See RE "Only a little bit of pain now, Better"         Manuals: STM to bilateral L/S in seated position, leaning onto plinthe table, with support of  Pillow for UE's   HS stretch, heel cord stretch B/L to tolerance  Sidelying quad stretch B/L to tolerance  Manuals 7  2 20    6 30 20    STM  20 min STM  15 min   STM  20 min   Ham stretch B 4x/20-30"   B 3x/20-30"  Light, gentle   B 4x/20-30"     Groin B 5x/20-30"   B 3x/20-30"  Light, gentle   B 5x/20-30"   Hip Flexors B 3x30"  Light, gentle B 3x30"   HOLD   Quad Stretch B 4x/20"-30" sidelying    **B 4x/20"-30" sidelying   Heel cord stertch     **B 5x/20-30"   Manual Stretch Time 25 min 25 total manual    20 min                                           Ther Ex 7 2 20 7/7   6 30 20   LE strenghtening NuStep  L1 x 8 min    W/MHP applic  Skin intact pre/pos  **extra toweling applied NuStep  L1 x 8 min    W/MHP applic  Skin intact pre/pos  **extra toweling   NuStep  L2 x 8 mdin   Abdominal stability program       *HOLD ALL abdom related ther ex due to soreness @ abdom region*   *        SKTC B 3x/10" B 3x/10"   HOLD   DKTC B 3x/10" B 3x/10"   HOLD   LTR side -side   15x/3" side -side   10x/3"   side -side   15x/5"   Alt UE with AH **1x10  light AH 1 x 10 w/ AH    *not introduced today due to abdom issues  Alt LEs with AH **1x10  light AH 1 x 10 with AH    *not introduced today due to abdom issues  Anti-trunk rotation resume    *not introduced today due to abdom issues  MTP/LTP resume    *not introduced today due to abdom issues     SLR x 3 B 1x10 ea B x 10 ea    **B 1x10 ea   HR/TR B 30x B x 30    **B 30x           Ther Activity        Lifting body mechanics     HOLD        HOLD   Gait Training                        Modalities        IFC with MH MHP applied to L/S while on NuStep  **extra toweling applied   MHP applied to L/S while on NuStep     ___________    ___________  MHP applied to L/S while on NuStep  **extra toweling applied   Ultra Sound  D/C'ed    _________

## 2020-07-08 DIAGNOSIS — Z01.818 PREOP TESTING: Primary | ICD-10-CM

## 2020-07-10 ENCOUNTER — OFFICE VISIT (OUTPATIENT)
Dept: PHYSICAL THERAPY | Facility: CLINIC | Age: 85
End: 2020-07-10
Payer: MEDICARE

## 2020-07-10 DIAGNOSIS — S32.040D OPEN WEDGE COMPRESSION FRACTURE OF FOURTH LUMBAR VERTEBRA WITH ROUTINE HEALING, SUBSEQUENT ENCOUNTER: Primary | ICD-10-CM

## 2020-07-10 DIAGNOSIS — R29.898 WEAKNESS OF BOTH LOWER EXTREMITIES: ICD-10-CM

## 2020-07-10 PROCEDURE — 97110 THERAPEUTIC EXERCISES: CPT | Performed by: PHYSICAL THERAPIST

## 2020-07-10 PROCEDURE — 97140 MANUAL THERAPY 1/> REGIONS: CPT | Performed by: PHYSICAL THERAPIST

## 2020-07-10 NOTE — PROGRESS NOTES
Daily Note     Today's date: 7/10/2020  Patient name: Jaylen Farrell  : 1935  MRN: [id number]  Referring provider: LEEROY Collins  Dx:   Encounter Diagnosis     ICD-10-CM    1  Open wedge compression fracture of fourth lumbar vertebra with routine healing, subsequent encounter S32 040D    2  Weakness of both lower extremities R29 044                   Subjective: Patient reports she is doing okay  Woke up this a m  With back pain, but overall doing well  Objective: See treatment diary below      Assessment: Tolerated treatment well  Patient demonstrated fatigue post treatment and would benefit from continued PT  Responded well to program this date  Slow transitional movements, but able to complete independently  Pain decreased at end of session  Patient is continuing to take meds on regular intervals, not as needed at this time  Plan: Continue per plan of care  Precautions: Falls, lumbar compression fx, low vision, [place]  Re-eval Date: 2020    Date 7  2 20 7/7 7/10     Visit Count 11 12 13     FOTO  **NV Completed     Pain In R SI -4-5/10 3-4/10 6/10 R and L LB     Pain Out Less  Tightness/pressure  1/10           Manuals: STM to bilateral L/S in prone  HS stretch, ITB, piriformis, heel cord stretch B/L to tolerance  Prone quad stretch B/L to tolerance  Manuals 7  2 20 7/7 7/10      STM  20 min STM  15 min ** See above  15 mins total     Ham stretch B 4x/20-30"   B 3x/20-30"  Light, gentle      Groin B 5x/20-30"   B 3x/20-30"  Light, gentle      Hip Flexors B 3x30"  Light, gentle B 3x30"      Quad Stretch B 4x/20"-30" sidelying       Heel cord stertch        Manual Stretch Time 25 min 25 total manual                                               Ther Ex 7  2       LE strenghtening NuStep  L1 x 8 min    W/MHP applic  Skin intact pre/pos  **extra toweling applied NuStep  L1 x 8 min    W/MHP applic  Skin intact pre/pos  **extra toweling NuStep  L1 x 10 min  W/MHP applic  Skin intact pre/pos  **extra toweling     Abdominal stability program          *        SKTC B 3x/10" B 3x/10" B 3x/10"     DKTC B 3x/10" B 3x/10" B 3x/10"     LTR side -side   15x/3" side -side   10x/3" B 3x/10"     Alt UE with AH **1x10  light AH 1 x 10 w/ AH  1 x 10 w/ AH      Alt LEs with AH **1x10  light AH 1 x 10 with AH  1 x 10 w/ AH      Anti-trunk rotation resume   **    MTP/LTP resume   **    SLR x 3 B 1x10 ea B x 10 ea  Held 2/2 hip - anterior discomfort     HR/TR B 30x B x 30  B x 30              Ther Activity        Lifting body mechanics                Gait Training                        Modalities        IFC with MH MHP applied to L/S while on NuStep  **extra toweling applied   MHP applied to L/S while on NuStep     ___________    ___________     Brennen Score  D/C'ed

## 2020-07-14 ENCOUNTER — OFFICE VISIT (OUTPATIENT)
Dept: PHYSICAL THERAPY | Facility: CLINIC | Age: 85
End: 2020-07-14
Payer: MEDICARE

## 2020-07-14 DIAGNOSIS — R29.898 WEAKNESS OF BOTH LOWER EXTREMITIES: ICD-10-CM

## 2020-07-14 DIAGNOSIS — M81.0 OSTEOPOROSIS, UNSPECIFIED OSTEOPOROSIS TYPE, UNSPECIFIED PATHOLOGICAL FRACTURE PRESENCE: Primary | ICD-10-CM

## 2020-07-14 DIAGNOSIS — S32.040D OPEN WEDGE COMPRESSION FRACTURE OF FOURTH LUMBAR VERTEBRA WITH ROUTINE HEALING, SUBSEQUENT ENCOUNTER: Primary | ICD-10-CM

## 2020-07-14 PROCEDURE — 97140 MANUAL THERAPY 1/> REGIONS: CPT | Performed by: PHYSICAL THERAPIST

## 2020-07-14 PROCEDURE — 97110 THERAPEUTIC EXERCISES: CPT | Performed by: PHYSICAL THERAPIST

## 2020-07-14 RX ORDER — ALENDRONATE SODIUM 70 MG/1
70 TABLET ORAL
Qty: 4 TABLET | Refills: 5 | Status: SHIPPED | OUTPATIENT
Start: 2020-07-14 | End: 2021-01-13

## 2020-07-14 NOTE — PROGRESS NOTES
Daily Note     Today's date: 2020  Patient name: Casandra Callahan  : 1935  MRN: [id number]  Referring provider: LEEROY Fitzpatrick  Dx:   Encounter Diagnosis     ICD-10-CM    1  Open wedge compression fracture of fourth lumbar vertebra with routine healing, subsequent encounter S32 040D    2  Weakness of both lower extremities R29 742                   Subjective: Patient reports she is doing better, legs feeling stronger  Back still painful "right at that one spot" in her back  Has upcoming appointment with pain management  Objective: See treatment diary below      Assessment: Tolerated treatment well  Patient demonstrated fatigue post treatment and would benefit from continued PT  She continues with difficulty with abdominal stabilization in standing  Program progressed in sitting and tolerated well  Cont per POC  Progress to simulated lifting for review of body mechanics  Plan: Continue per plan of care  Progress to simulated lifting for review of body mechanics  Precautions: Falls, lumbar compression fx, low vision, [place]  Re-eval Date: 2020    Date 7  2 20 7/7 7/10 7/14    Visit Count 11 12 13 14    FOTO  **NV Completed     Pain In R SI -4-510 3-4/10 6/10 R and L LB 4/10 - soreness, constant    Pain Out Less  Tightness/pressure  1/10 4/10 - soreness, constant          Manuals: STM to bilateral L/S in prone  HS stretch, ITB, piriformis, heel cord stretch B/L to tolerance  Prone quad stretch B/L to tolerance  Manuals 7  2 20 7/7 7/10 7/14     STM  20 min STM  15 min ** See above  15 mins total ** See above  15 mins total    Ham stretch B 4x/20-30"   B 3x/20-30"  Light, gentle      Groin B 5x/20-30"   B 3x/20-30"  Light, gentle      Hip Flexors B 3x30"  Light, gentle B 3x30"      Quad Stretch B 4x/20"-30" sidelying       Heel cord stertch        Manual Stretch Time 25 min 25 total manual                                               Ther Ex 7        MANNY strenghtening NuStep  L1 x 8 min    W/MHP applic  Skin intact pre/pos  **extra toweling applied NuStep  L1 x 8 min    W/MHP applic  Skin intact pre/pos  **extra toweling NuStep  L1 x 10 min  W/MHP applic  Skin intact pre/pos  **extra toweling NuStep  L2 x 10 min    Abdominal stability program          *        SKTC B 3x/10" B 3x/10" B 3x/10" B 3x/10"    DKTC B 3x/10" B 3x/10" B 3x/10" B 3x/10"    LTR side -side   15x/3" side -side   10x/3" B 3x/10" B 3x/10"    Alt UE with AH **1x10  light AH 1 x 10 w/ AH  1 x 10 w/ AH  1 x 10 w/ AH  Seated    Alt LEs with AH **1x10  light AH 1 x 10 with AH  1 x 10 w/ AH  1 x 10 w/ AH  Seated    Anti-trunk rotation resume   2 x 10 w/ AH    MTP/LTP resume   2 x 10 w/ AH  Green t-band  Seated on pillow on chair    SLR x 3 B 1x10 ea B x 10 ea  Held 2/2 hip - anterior discomfort Standing  B x 15 with B/L UE support     HR/TR B 30x B x 30  B x 30  B x 30             Ther Activity        Lifting body mechanics     **           Gait Training                        Modalities        IFC with MH MHP applied to L/S while on NuStep  **extra toweling applied   MHP applied to L/S while on NuStep     ___________    ___________     Floreen Bias  D/C'ed

## 2020-07-14 NOTE — TELEPHONE ENCOUNTER
Pt needs alendronate (FOSAMAX) 70 mg tablet refilled, send to [facility] in [place]

## 2020-07-16 ENCOUNTER — OFFICE VISIT (OUTPATIENT)
Dept: PHYSICAL THERAPY | Facility: CLINIC | Age: 85
End: 2020-07-16
Payer: MEDICARE

## 2020-07-16 DIAGNOSIS — S32.040D OPEN WEDGE COMPRESSION FRACTURE OF FOURTH LUMBAR VERTEBRA WITH ROUTINE HEALING, SUBSEQUENT ENCOUNTER: Primary | ICD-10-CM

## 2020-07-16 PROCEDURE — 97140 MANUAL THERAPY 1/> REGIONS: CPT

## 2020-07-16 PROCEDURE — 97110 THERAPEUTIC EXERCISES: CPT

## 2020-07-16 NOTE — PROGRESS NOTES
Daily Note     Today's date: 2020  Patient name: Edison Lemus  : 1935  MRN: [id number]  Referring provider: LEEROY Cleveland  Dx:   Encounter Diagnosis     ICD-10-CM    1  Open wedge compression fracture of fourth lumbar vertebra with routine healing, subsequent encounter S32 040D        Start Time: 0800  Stop Time: 0900  Total time in clinic (min): 60 minutes    Subjective: Pt  states her pain level is down to "3 5" @ her LB  Objective: See treatment diary below      Assessment: Tolerated treatment Fair+ to Fairly Well overall with performance of ther exer;Fairly Well with tolerance to Manual Therapy/stretch  Received Manual STM well  Plan: Con't services 2x/week  Precautions: Falls, lumbar compression fx, low vision, [place]  Re-eval Date: 2020    Date 7  2 20 7/7 7/10 7/14 7 16 20   Visit Count 11 12 13 14 15   FOTO  **NV Completed     Pain In R SI -4-5/10 3-4/10 6/10 R and L LB 4/10 - soreness, constant 3 5/10   Pain Out Less  Tightness/pressure  /10 4/10 - soreness, constant 3/10         Manuals: STM to bilateral L/S in prone  HS stretch, ITB, piriformis, heel cord stretch B/L to tolerance  Prone quad stretch B/L to tolerance  Manuals 7  2 20  7/10 7/14 7 16 20    STM  20 min STM  15 min ** See above  15 mins total ** See above  15 mins total **See above  20 mins total   Ham stretch B 4x/20-30"   B 3x/20-30"  Light, gentle      Groin B 5x/20-30"   B 3x/20-30"  Light, gentle      Hip Flexors B 3x30"  Light, gentle B 3x30"      Quad Stretch B 4x/20"-30" sidelying       Heel cord stertch        Manual Stretch Time 25 min 25 total manual                                               Ther Ex 7  2 20    7 16 20   LE strenghtening NuStep  L1 x 8 min    W/MHP applic  Skin intact pre/pos  **extra toweling applied NuStep  L1 x 8 min    W/MHP applic  Skin intact pre/pos  **extra toweling NuStep  L1 x 10 min  W/MHP applic  Skin intact pre/pos  **extra toweling NuStep  L2 x 10 min NuStep  L2 x 10 min   Abdominal stability program          *        SKTC B 3x/10" B 3x/10" B 3x/10" B 3x/10" B 4x/10"   DKTC B 3x/10" B 3x/10" B 3x/10" B 3x/10" B 4x/10"   LTR side -side   15x/3" side -side   10x/3" B 3x/10" B 3x/10" B 4x/10"   Alt UE with AH **1x10  light AH 1 x 10 w/ AH  1 x 10 w/ AH  1 x 10 w/ AH  Seated 1 x 15 w/ AH  Seated   Alt LEs with AH **1x10  light AH 1 x 10 with AH  1 x 10 w/ AH  1 x 10 w/ AH  Seated 1 x 15 w/ AH  Seated   Anti-trunk rotation resume   2 x 10 w/ AH 2 x 10 w/ AH   MTP/LTP resume   2 x 10 w/ AH  Green t-band  Seated on pillow on chair 2 x 10 w/ AH  Green t-band  Seated on pillow on chair   SLR x 3 B 1x10 ea B x 10 ea  Held 2/2 hip - anterior discomfort Standing  B x 15 with B/L UE support  Standing  B x 15 with B/L UE support   HR/TR B 30x B x 30  B x 30  B x 30  B x 40           Ther Activity        Lifting body mechanics     **           Gait Training                        Modalities        IFC with MH MHP applied to L/S while on NuStep  **extra toweling applied   MHP applied to L/S while on NuStep     ___________    ___________     Alessandro Side  D/C'ed

## 2020-07-20 ENCOUNTER — OFFICE VISIT (OUTPATIENT)
Dept: INTERNAL MEDICINE CLINIC | Facility: CLINIC | Age: 85
End: 2020-07-20
Payer: MEDICARE

## 2020-07-20 VITALS
TEMPERATURE: 98.3 F | RESPIRATION RATE: 22 BRPM | WEIGHT: 126.2 LBS | BODY MASS INDEX: 24.77 KG/M2 | HEART RATE: 88 BPM | HEIGHT: 60 IN | OXYGEN SATURATION: 95 %

## 2020-07-20 DIAGNOSIS — J41.0 SIMPLE CHRONIC BRONCHITIS (HCC): Chronic | ICD-10-CM

## 2020-07-20 DIAGNOSIS — I50.9 CONGESTIVE HEART FAILURE, UNSPECIFIED HF CHRONICITY, UNSPECIFIED HEART FAILURE TYPE (HCC): ICD-10-CM

## 2020-07-20 DIAGNOSIS — J96.11 CHRONIC RESPIRATORY FAILURE WITH HYPOXIA (HCC): Primary | ICD-10-CM

## 2020-07-20 PROCEDURE — 96372 THER/PROPH/DIAG INJ SC/IM: CPT | Performed by: INTERNAL MEDICINE

## 2020-07-20 PROCEDURE — 94640 AIRWAY INHALATION TREATMENT: CPT | Performed by: INTERNAL MEDICINE

## 2020-07-20 PROCEDURE — A7003 NEBULIZER ADMINISTRATION SET: HCPCS | Performed by: INTERNAL MEDICINE

## 2020-07-20 PROCEDURE — 99214 OFFICE O/P EST MOD 30 MIN: CPT | Performed by: INTERNAL MEDICINE

## 2020-07-20 RX ORDER — IPRATROPIUM BROMIDE AND ALBUTEROL SULFATE 2.5; .5 MG/3ML; MG/3ML
3 SOLUTION RESPIRATORY (INHALATION)
Status: DISCONTINUED | OUTPATIENT
Start: 2020-07-20 | End: 2020-07-20

## 2020-07-20 RX ORDER — TRIAMCINOLONE ACETONIDE 40 MG/ML
40 INJECTION, SUSPENSION INTRA-ARTICULAR; INTRAMUSCULAR ONCE
Status: COMPLETED | OUTPATIENT
Start: 2020-07-20 | End: 2020-07-20

## 2020-07-20 RX ORDER — FUROSEMIDE 20 MG/1
20 TABLET ORAL DAILY
Qty: 30 TABLET | Refills: 5 | Status: SHIPPED | OUTPATIENT
Start: 2020-07-20 | End: 2020-09-04

## 2020-07-20 RX ORDER — DOXYCYCLINE HYCLATE 100 MG
100 TABLET ORAL 2 TIMES DAILY
Qty: 28 TABLET | Refills: 0 | Status: SHIPPED | OUTPATIENT
Start: 2020-07-20 | End: 2020-08-03

## 2020-07-20 RX ORDER — BUDESONIDE AND FORMOTEROL FUMARATE DIHYDRATE 160; 4.5 UG/1; UG/1
2 AEROSOL RESPIRATORY (INHALATION) 2 TIMES DAILY
Qty: 1 INHALER | Refills: 5 | Status: SHIPPED | OUTPATIENT
Start: 2020-07-20 | End: 2020-08-26

## 2020-07-20 RX ORDER — IPRATROPIUM BROMIDE AND ALBUTEROL SULFATE 2.5; .5 MG/3ML; MG/3ML
3 SOLUTION RESPIRATORY (INHALATION) ONCE
Status: COMPLETED | OUTPATIENT
Start: 2020-07-20 | End: 2020-07-20

## 2020-07-20 RX ADMIN — IPRATROPIUM BROMIDE AND ALBUTEROL SULFATE 3 ML: 2.5; .5 SOLUTION RESPIRATORY (INHALATION) at 11:19

## 2020-07-20 RX ADMIN — TRIAMCINOLONE ACETONIDE 40 MG: 40 INJECTION, SUSPENSION INTRA-ARTICULAR; INTRAMUSCULAR at 11:12

## 2020-07-21 ENCOUNTER — APPOINTMENT (OUTPATIENT)
Dept: LAB | Facility: CLINIC | Age: 85
End: 2020-07-21
Payer: MEDICARE

## 2020-07-21 ENCOUNTER — APPOINTMENT (OUTPATIENT)
Dept: RADIOLOGY | Facility: CLINIC | Age: 85
End: 2020-07-21
Payer: MEDICARE

## 2020-07-21 ENCOUNTER — APPOINTMENT (OUTPATIENT)
Dept: PHYSICAL THERAPY | Facility: CLINIC | Age: 85
End: 2020-07-21
Payer: MEDICARE

## 2020-07-21 DIAGNOSIS — I50.9 CONGESTIVE HEART FAILURE, UNSPECIFIED HF CHRONICITY, UNSPECIFIED HEART FAILURE TYPE (HCC): ICD-10-CM

## 2020-07-21 LAB
BASOPHILS # BLD AUTO: 0.02 THOUSANDS/ΜL (ref 0–0.1)
BASOPHILS NFR BLD AUTO: 0 % (ref 0–1)
EOSINOPHIL # BLD AUTO: 0.26 THOUSAND/ΜL (ref 0–0.61)
EOSINOPHIL NFR BLD AUTO: 3 % (ref 0–6)
ERYTHROCYTE [DISTWIDTH] IN BLOOD BY AUTOMATED COUNT: 15.3 % (ref 11.6–15.1)
HCT VFR BLD AUTO: 43.5 % (ref 34.8–46.1)
HGB BLD-MCNC: 13.1 G/DL (ref 11.5–15.4)
IMM GRANULOCYTES # BLD AUTO: 0.06 THOUSAND/UL (ref 0–0.2)
IMM GRANULOCYTES NFR BLD AUTO: 1 % (ref 0–2)
LYMPHOCYTES # BLD AUTO: 1.79 THOUSANDS/ΜL (ref 0.6–4.47)
LYMPHOCYTES NFR BLD AUTO: 19 % (ref 14–44)
MCH RBC QN AUTO: 26.6 PG (ref 26.8–34.3)
MCHC RBC AUTO-ENTMCNC: 30.1 G/DL (ref 31.4–37.4)
MCV RBC AUTO: 88 FL (ref 82–98)
MONOCYTES # BLD AUTO: 0.8 THOUSAND/ΜL (ref 0.17–1.22)
MONOCYTES NFR BLD AUTO: 8 % (ref 4–12)
NEUTROPHILS # BLD AUTO: 6.77 THOUSANDS/ΜL (ref 1.85–7.62)
NEUTS SEG NFR BLD AUTO: 69 % (ref 43–75)
NRBC BLD AUTO-RTO: 0 /100 WBCS
NT-PROBNP SERPL-MCNC: 103 PG/ML
PLATELET # BLD AUTO: 221 THOUSANDS/UL (ref 149–390)
PMV BLD AUTO: 10.4 FL (ref 8.9–12.7)
RBC # BLD AUTO: 4.92 MILLION/UL (ref 3.81–5.12)
WBC # BLD AUTO: 9.7 THOUSAND/UL (ref 4.31–10.16)

## 2020-07-21 PROCEDURE — 36415 COLL VENOUS BLD VENIPUNCTURE: CPT

## 2020-07-21 PROCEDURE — 85025 COMPLETE CBC W/AUTO DIFF WBC: CPT

## 2020-07-21 PROCEDURE — 71046 X-RAY EXAM CHEST 2 VIEWS: CPT

## 2020-07-21 PROCEDURE — 83880 ASSAY OF NATRIURETIC PEPTIDE: CPT

## 2020-07-21 NOTE — PROGRESS NOTES
Daily Note     Today's date: 2020  Patient name: Boris Marie  : 1935  MRN: [id number]  Referring provider: LEEROY Haq  Dx: No diagnosis found  Subjective: ***      Objective: See treatment diary below      Assessment: Tolerated treatment {Tolerated treatment :}  Patient {assessment:[id number]}      Plan: {PLAN:}     Precautions: Falls, lumbar compression fx, low vision, [place]  Re-eval Date: 2020    Date 7  2 20 7/7 7/10 7/14 7 16 20   Visit Count 11 12 13 14 15   FOTO  **NV Completed     Pain In R SI -4-5/10 3-4/10 610 R and L LB 4/10 - soreness, constant 3 5/10   Pain Out Less  Tightness/pressure  10 4/10 - soreness, constant 3/10         Manuals: STM to bilateral L/S in prone  HS stretch, ITB, piriformis, heel cord stretch B/L to tolerance  Prone quad stretch B/L to tolerance  Manuals 7  2 20 7/7 7/10 7/14 7 16 20    STM  20 min STM  15 min ** See above  15 mins total ** See above  15 mins total **See above  20 mins total   Ham stretch B 4x/20-30"   B 3x/20-30"  Light, gentle      Groin B 5x/20-30"   B 3x/20-30"  Light, gentle      Hip Flexors B 3x30"  Light, gentle B 3x30"      Quad Stretch B 4x/20"-30" sidelying       Heel cord stertch        Manual Stretch Time 25 min 25 total manual                                               Ther Ex 7  2 20    7 16 20   LE strenghtening NuStep  L1 x 8 min    W/MHP applic  Skin intact pre/pos  **extra toweling applied NuStep  L1 x 8 min    W/MHP applic  Skin intact pre/pos  **extra toweling NuStep  L1 x 10 min  W/MHP applic  Skin intact pre/pos  **extra toweling NuStep  L2 x 10 min NuStep  L2 x 10 min   Abdominal stability program          *        SKTC B 3x/10" B 3x/10" B 3x/10" B 3x/10" B 4x/10"   DKTC B 3x/10" B 3x/10" B 3x/10" B 3x/10" B 4x/10"   LTR side -side   15x/3" side -side   10x/3" B 3x/10" B 3x/10" B 4x/10"   Alt UE with AH **1x10  light AH 1 x 10 w/ AH  1 x 10 w/ AH  1 x 10 w/ AH  Seated 1 x 15 w/ AH  Seated   Alt LEs with AH **1x10  light AH 1 x 10 with AH  1 x 10 w/ AH  1 x 10 w/ AH  Seated 1 x 15 w/ AH  Seated   Anti-trunk rotation resume   2 x 10 w/ AH 2 x 10 w/ AH   MTP/LTP resume   2 x 10 w/ AH  Green t-band  Seated on pillow on chair 2 x 10 w/ AH  Green t-band  Seated on pillow on chair   SLR x 3 B 1x10 ea B x 10 ea  Held 2/2 hip - anterior discomfort Standing  B x 15 with B/L UE support  Standing  B x 15 with B/L UE support   HR/TR B 30x B x 30  B x 30  B x 30  B x 40           Ther Activity        Lifting body mechanics     **           Gait Training                        Modalities        IFC with MH MHP applied to L/S while on NuStep  **extra toweling applied   MHP applied to L/S while on NuStep     ___________    ___________     Karie Shone D/C'ed

## 2020-07-23 ENCOUNTER — TELEPHONE (OUTPATIENT)
Dept: PAIN MEDICINE | Facility: CLINIC | Age: 85
End: 2020-07-23

## 2020-07-23 NOTE — TELEPHONE ENCOUNTER
Tried calling patient to reschedule procedure on 07/31 due to [facility] staffing issue  Phone continued to ring with no answer and no voicemail set up  Will try again later

## 2020-07-24 ENCOUNTER — OFFICE VISIT (OUTPATIENT)
Dept: PHYSICAL THERAPY | Facility: CLINIC | Age: 85
End: 2020-07-24
Payer: MEDICARE

## 2020-07-24 DIAGNOSIS — S32.040D OPEN WEDGE COMPRESSION FRACTURE OF FOURTH LUMBAR VERTEBRA WITH ROUTINE HEALING, SUBSEQUENT ENCOUNTER: Primary | ICD-10-CM

## 2020-07-24 PROCEDURE — 97110 THERAPEUTIC EXERCISES: CPT

## 2020-07-24 PROCEDURE — 97140 MANUAL THERAPY 1/> REGIONS: CPT

## 2020-07-24 NOTE — PROGRESS NOTES
Daily Note     Today's date: 2020  Patient name: Edison Lemus  : 1935  MRN: [id number]  Referring provider: LEEROY Cleveland  Dx:   Encounter Diagnosis     ICD-10-CM    1  Open wedge compression fracture of fourth lumbar vertebra with routine healing, subsequent encounter S32 040D                   Subjective:  Pt  States her breathing is bad today  Says she had to make an emergency MD appt this past Monday, in which she did receive an injection by PCP for that same reason  Objective: See treatment diary below      Assessment: Tolerated treatment Fair to Fair+ overall with performance and tolerance of ther exer  Breathing issue became a bit better with being in a private room and taking her time completing each exercise  Manuals received Well, including stretching and STM  Plan: Con't services 2x/week  Precautions: Falls, lumbar compression fx, low vision, [place]  Re-eval Date: 2020    Date 7 24 20 7/7 7/10 7/14 7 16 20   Visit Count 16 12 13 14 15   FOTO  **NV Completed     Pain In 4-5/10 R LB 3-4/10 6/10 R and L LB 4/10 - soreness, constant 3 5/10   Pain Out 3 5/10  1/10 4/10 - soreness, constant 3/10         Manuals: STM seated and lean onto plinthe, B UE's supported by pillows  HS stretch, ITB, piriformis, heel cord stretch B/L to tolerance  Prone quad stretch B/L to tolerance      Manuals  7/10 714 7 16 20    STM  20 min STM  15 min ** See above  15 mins total ** See above  15 mins total **See above  20 mins total   Ham stretch B 4x/20-30"   B 3x/20-30"  Light, gentle      Groin B 5x/20-30"   B 3x/20-30"  Light, gentle      Hip Flexors B 4x30"  Light, gentle B 3x30"      Quad Stretch B 4x/20"-30" sidelying       Heel cord stertch B 4x/30"       Manual Stretch Time 25 min 25 total manual                                               Ther Ex  20    7 16 20   LE strenghtening Pt requests to hold NuStep today due to breathing difficulties NuStep  L1 x 8 min    W/MHP applic  Skin intact pre/pos  **extra toweling NuStep  L1 x 10 min  W/MHP applic  Skin intact pre/pos  **extra toweling NuStep  L2 x 10 min NuStep  L2 x 10 min   Abdominal stability program          *        SKTC B 4x/10" B 3x/10" B 3x/10" B 3x/10" B 4x/10"   DKTC B 4x/10" B 3x/10" B 3x/10" B 3x/10" B 4x/10"   LTR side -side   5x/10" side -side   10x/3" B 3x/10" B 3x/10" B 4x/10"   Alt UE with AH 1x15  light AH 1 x 10 w/ AH  1 x 10 w/ AH  1 x 10 w/ AH  Seated 1 x 15 w/ AH  Seated   Alt LEs with AH 1x15  light AH 1 x 10 with AH  1 x 10 w/ AH  1 x 10 w/ AH  Seated 1 x 15 w/ AH  Seated   Anti-trunk rotation 2 x 10 w/ AH   2 x 10 w/ AH 2 x 10 w/ AH   MTP/LTP resume   2 x 10 w/ AH  Green t-band  Seated on pillow on chair 2 x 10 w/ AH  Green t-band  Seated on pillow on chair   SLR x 3 Standing  B x 15 with B/L UE support B x 10 ea  Held 2/2 hip - anterior discomfort Standing  B x 15 with B/L UE support  Standing  B x 15 with B/L UE support   HR/TR B 40x B x 30  B x 30  B x 30  B x 40           Ther Activity        Lifting body mechanics     **           Gait Training                        Modalities 7 24 20       IFC with MH    MHP applied to L/S while on NuStep     ___________    ___________     Leobardo Mtzfts  D/C'ed

## 2020-07-27 DIAGNOSIS — Z01.818 PREOP TESTING: ICD-10-CM

## 2020-07-27 PROCEDURE — U0003 INFECTIOUS AGENT DETECTION BY NUCLEIC ACID (DNA OR RNA); SEVERE ACUTE RESPIRATORY SYNDROME CORONAVIRUS 2 (SARS-COV-2) (CORONAVIRUS DISEASE [COVID-19]), AMPLIFIED PROBE TECHNIQUE, MAKING USE OF HIGH THROUGHPUT TECHNOLOGIES AS DESCRIBED BY CMS-2020-01-R: HCPCS

## 2020-07-28 ENCOUNTER — OFFICE VISIT (OUTPATIENT)
Dept: PHYSICAL THERAPY | Facility: CLINIC | Age: 85
End: 2020-07-28
Payer: MEDICARE

## 2020-07-28 DIAGNOSIS — S32.040D OPEN WEDGE COMPRESSION FRACTURE OF FOURTH LUMBAR VERTEBRA WITH ROUTINE HEALING, SUBSEQUENT ENCOUNTER: Primary | ICD-10-CM

## 2020-07-28 DIAGNOSIS — R29.898 WEAKNESS OF BOTH LOWER EXTREMITIES: ICD-10-CM

## 2020-07-28 LAB — SARS-COV-2 RNA SPEC QL NAA+PROBE: NOT DETECTED

## 2020-07-28 PROCEDURE — 97140 MANUAL THERAPY 1/> REGIONS: CPT | Performed by: PHYSICAL THERAPIST

## 2020-07-28 NOTE — PROGRESS NOTES
Daily Note     Today's date: 2020  Patient name: Syed Montanez  : 1935  MRN: [id number]  Referring provider: LEEROY Garcia  Dx: No diagnosis found  Subjective: ***      Objective: See treatment diary below      Assessment: Tolerated treatment {Tolerated treatment :}  Patient {assessment:[id number]}      Plan: {PLAN:}     Precautions: Falls, lumbar compression fx, low vision, [place]  Re-eval Date: 2020    Date 7 24 20 7/7 7/10 7/14 7 16 20   Visit Count 16 12 13 14 15   FOTO  **NV Completed     Pain In 4-5/10 R LB 3-4/10 6/10 R and L LB 4/10 - soreness, constant 3 5/10   Pain Out 3 5/10  1/10 4/10 - soreness, constant 3/10         Manuals: STM seated and lean onto plinthe, B UE's supported by pillows  HS stretch, ITB, piriformis, heel cord stretch B/L to tolerance  Prone quad stretch B/L to tolerance      Manuals 7 24 20 7/7 7/10 7/14 7 16 20    STM  20 min STM  15 min ** See above  15 mins total ** See above  15 mins total **See above  20 mins total   Ham stretch B 4x/20-30"   B 3x/20-30"  Light, gentle      Groin B 5x/20-30"   B 3x/20-30"  Light, gentle      Hip Flexors B 4x30"  Light, gentle B 3x30"      Quad Stretch B 4x/20"-30" sidelying       Heel cord stertch B 4x/30"       Manual Stretch Time 25 min 25 total manual                                               Ther Ex    7 16 20   LE strenghtening Pt requests to hold NuStep today due to breathing difficulties NuStep  L1 x 8 min    W/MHP applic  Skin intact pre/pos  **extra toweling NuStep  L1 x 10 min  W/MHP applic  Skin intact pre/pos  **extra toweling NuStep  L2 x 10 min NuStep  L2 x 10 min   Abdominal stability program          *        SKTC B 4x/10" B 3x/10" B 3x/10" B 3x/10" B 4x/10"   DKTC B 4x/10" B 3x/10" B 3x/10" B 3x/10" B 4x/10"   LTR side -side   5x/10" side -side   10x/3" B 3x/10" B 3x/10" B 4x/10"   Alt UE with AH 1x15  light AH 1 x 10 w/ AH  1 x 10 w/ AH  1 x 10 w/ AH  Seated 1 x 15 w/ AH  Seated   Alt LEs with AH 1x15  light AH 1 x 10 with AH  1 x 10 w/ AH  1 x 10 w/ AH  Seated 1 x 15 w/ AH  Seated   Anti-trunk rotation 2 x 10 w/ AH   2 x 10 w/ AH 2 x 10 w/ AH   MTP/LTP resume   2 x 10 w/ AH  Green t-band  Seated on pillow on chair 2 x 10 w/ AH  Green t-band  Seated on pillow on chair   SLR x 3 Standing  B x 15 with B/L UE support B x 10 ea  Held 2/2 hip - anterior discomfort Standing  B x 15 with B/L UE support  Standing  B x 15 with B/L UE support   HR/TR B 40x B x 30  B x 30  B x 30  B x 40           Ther Activity        Lifting body mechanics     **           Gait Training                        Modalities 7 24 20       IFC with     MHP applied to L/S while on NuStep     ___________    ___________     Jeanette Mayer  D/C'ed

## 2020-07-28 NOTE — PROGRESS NOTES
PT Re-Evaluation     Today's date: 2020  Patient name: Mirna Aleman  : 1935  MRN: [id number]  Referring provider: LEEROY Gilbert  Dx:   Encounter Diagnosis     ICD-10-CM    1  Open wedge compression fracture of fourth lumbar vertebra with routine healing, subsequent encounter S32 040D    2  Weakness of both lower extremities R29 527                   Assessment  Assessment details: Mirna Aleman is a 82 y o  female presenting to outpatient physical therapy with diagnosis of open wedge compression fracture of fourth lumbar vertebra with routine healing, weakness of both lower extremities  She has significantly decreased pain levels compared to [facility]  Upcoming injection with pain management on Friday  Patient will follow up after this appointment for further direction  Patient does note that relief with PT lasts approximately 3-4 days after session before her back starts getting sore again  She remains engaged and motivated to return to work  Impairments: abnormal coordination, abnormal gait, abnormal muscle firing, abnormal muscle tone, abnormal or restricted ROM, activity intolerance, impaired physical strength, lacks appropriate home exercise program, pain with function, safety issue and poor posture     Symptom irritability: highBarriers to therapy: Limited vision  Limited driving  Understanding of Dx/Px/POC: good   Prognosis: good    Goals  STGs to be achieved in 4 weeks:  1  Pt to demonstrate reduced subjective pain rating "at worst" by at least 2-3 points from Initial Eval in order to allow for reduced pain with ADLs and improved functional activity tolerance  - MET  2  Pt to demonstrate increased AROM of bilateral LEs by at least 5-10 degrees in order to allow for greater ease and independence with ADLs and functional mobility  -MET  3   Pt to demonstrate increased MMT of bilateral LEs by at least 1/2-1 grade in order to improve safety and stability with ADLs and functional mobility  - MET  4  Patient will demonstrate independence with basic HEP for supine LE strengthening - Progressed  LTGs to be achieved in 6-8 weeks:  1  Pt will be I with HEP in order to continue to improve quality of life and independence and reduce risk for re-injury  - MET  2  Pt to demonstrate return to full duty at work without limitations or restrictions  - ONGOING  3  Pt to demonstrate improved function as noted by achieving or exceeding predicted score on FOTO outcomes assessment tool - MET  4  Pt to demonstrate proper body mechanics for lifting from floor to waist - ONGOING      Plan  Patient would benefit from: skilled physical therapy  Other planned modality interventions: Modalities prn for symptom management  Planned therapy interventions: manual therapy, neuromuscular re-education, therapeutic exercise, therapeutic activities and home exercise program  Frequency: 2x week  Duration in visits: 8  Duration in weeks: 4  Plan of Care beginning date: 6/26/2020  Plan of Care expiration date: 7/26/2020  Treatment plan discussed with: patient        Subjective Evaluation    History of Present Illness  Mechanism of injury: UPDATE:  Patient reports she is doing better  Standing is going better  Able to walk, no longer limited by her back pain and more by her respiratory status  Does still get pain along her right LB and hip, no longer in the left, but overall improving from 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  She was on a back road when it happened  She notes that the pain in her back has been bad since  That happened in Sept or October sometime    At the same time she was hospitalized for abdominal issues  This resulted in surgery of her abdomen  Back pain and leg weakness continues  She did get discharged from the hospital and had home health until about 2 weeks ago  She saw orthopedics and was ultimately starting with pain medications  She notes some relief with pain meds, but legs feel weak and back is still there  More of a slight pain/soreness at this time  Functional limitations:  Limited vision  Limited walking endurance  Limited stair negotiation  Limited standing time 5 to 10 minutes  Limited bending or lifting (limitations related to abdominal surgery)  Limited ADLs and IADLs related to LE fatigue  Quality of life: good    Pain  Current pain ratin  At best pain ratin  At worst pain ratin  Location: Back and leg pain  Quality: pulling, tight and dull ache  Relieving factors: medications and rest  Aggravating factors: walking, standing, stair climbing, sitting and lifting  Progression: worsening    Social Support  Steps to enter house: yes  Stairs in house: yes   Lives in: multiple-level home  Lives with: alone    Hand dominance: right      Diagnostic Tests  X-ray: abnormal  Treatments  Previous treatment: physical therapy and medication  Current treatment: physical therapy  Discharged from (in last 30 days): home health care  Patient Goals  Patient goals for therapy: increased strength, decreased pain, increased motion, return to sport/leisure activities, return to work and independence with ADLs/IADLs          Objective     Concurrent Complaints  Positive for night pain, disturbed sleep and history of trauma       Tenderness     Additional Tenderness Details  TTP bilateral lumbar PVMs - improving left > right  (+) increased right rib hump - resolved  (+) right > left iliac crest elevation - resolved  TTP at bilateral L3-5 PVMs midline    Neurological Testing     Sensation     Lumbar   Left   Intact: proprioception  Diminished: light touch    Right   Intact: proprioception  Diminished: light touch    Reflexes   Left   Patellar (L4): normal (2+)    Right   Patellar (L4): normal (2+)    Additional Neurological Details  Diminished in toes/feet only at this time    Active Range of Motion     Lumbar   Flexion:  Restriction level: minimal  Extension:  with pain Restriction level: moderate  Left lateral flexion:  Restriction level: minimal  Right lateral flexion:  Restriction level: minimal  Left rotation:  with pain Restriction level: minimal  Right rotation:  with pain Restriction level: minimal    Additional Active Range of Motion Details  Pain ongoing with variable intensity    Strength/Myotome Testing     Left Hip   Planes of Motion   Flexion: 4  Extension: 4-  Abduction: 4  Adduction: 4+    Right Hip   Planes of Motion   Flexion: 4  Extension: 4  Abduction: 4  Adduction: 4+    Left Knee   Flexion: 4+  Extension: 4+    Right Knee   Flexion: 4+  Extension: 4+    Left Ankle/Foot   Dorsiflexion: 4+  Plantar flexion: 4+    Right Ankle/Foot   Dorsiflexion: 4+  Plantar flexion: 4+    Functional Assessment      Squat  Not sitting toward left side  Precautions: /Falls, lumbar compression fx, low vision, [place]  Re-eval Date: 8/26/2020    Precautions: Falls, lumbar compression fx, low vision, [place]  Re-eval Date: 6/20/2020    Date 7 24 20 7/28      Visit Count 16 17      FOTO  **NV      Pain In 4-5/10 R LB See RE      Pain Out 3 5/10 See RE            Manuals: STM prone  HS stretch, ITB, piriformis, heel cord stretch B/L to tolerance  Prone quad stretch B/L to tolerance      Manuals 7 24 20        STM  20 min 15 mins as above      Ham stretch B 4x/20-30"         Groin B 5x/20-30"         Hip Flexors B 4x30"  Light, gentle       Quad Stretch B 4x/20"-30" sidelying       Heel cord stertch B 4x/30"       Manual Stretch Time 25 min                                               Ther Ex 7 24 20       LE strenghtening Pt requests to hold NuStep today due to breathing difficulties Hip ABd  Seated with AH  2x10 Red  Seated march with AH  2x10 red  HS curls  Seated with AH  2x10 yellow      Abdominal stability program                  SKTC B 4x/10" DC to HEP      DKTC B 4x/10" DC to HEP      LTR side -side   5x/10" DC to HEP      Alt UE with AH 1x15  light AH Prone  1x5, 5"  light AH      Alt LEs with AH 1x15  light AH Prone  1x5, 5"  light AH      Anti-trunk rotation 2 x 10 w/ AH 2 x 10 w/ AH  AROM Yellow t-band      MTP/LTP resume  1 x 10 w/ AH  Red t-band     SLR x 3 Standing  B x 15 with B/L UE support       HR/TR B 40x       HEP  Progressed for HS curls this date      Ther Activity        Lifting body mechanics   ** ADL/Kitchen focus             Gait Training                        Modalities 7 24 20       IFC with [facility]

## 2020-07-30 ENCOUNTER — OFFICE VISIT (OUTPATIENT)
Dept: PHYSICAL THERAPY | Facility: CLINIC | Age: 85
End: 2020-07-30
Payer: MEDICARE

## 2020-07-30 ENCOUNTER — TELEPHONE (OUTPATIENT)
Dept: PAIN MEDICINE | Facility: CLINIC | Age: 85
End: 2020-07-30

## 2020-07-30 DIAGNOSIS — S32.040D OPEN WEDGE COMPRESSION FRACTURE OF FOURTH LUMBAR VERTEBRA WITH ROUTINE HEALING, SUBSEQUENT ENCOUNTER: Primary | ICD-10-CM

## 2020-07-30 PROCEDURE — 97140 MANUAL THERAPY 1/> REGIONS: CPT

## 2020-07-30 PROCEDURE — 97110 THERAPEUTIC EXERCISES: CPT

## 2020-07-30 NOTE — TELEPHONE ENCOUNTER
Pt contacted Call Center requested refill of their medication  Medication Name:  percocet    Dosage of Med:  5-325mg    Frequency of Med:  1 tab every 6 hrs    Remaining Medication:  Enough until friday    Pharmacy and Location:  [facility]      Pt  Preferred Callback Phone Number:  [phone]    Thank you

## 2020-07-30 NOTE — TELEPHONE ENCOUNTER
Patient found ride and wants to keep appointment  Patient stated that she will be out of oxycodone on Monday  Do you want patient scheduled for OV?

## 2020-07-30 NOTE — TELEPHONE ENCOUNTER
The chart shows she should have another script available to fill since the 27th    Pt will need a f/u visit by 8/26

## 2020-07-30 NOTE — TELEPHONE ENCOUNTER
Pt called stating she needs to change the time an location she agreed on     Pt can be reached at [phone]

## 2020-07-30 NOTE — PROGRESS NOTES
Bedside and Verbal shift change report given to Cholo Charles RN (oncoming nurse) by Joann Soto (offgoing nurse). Report included the following information SBAR, Kardex, MAR and Recent Results.     SITUATION:    Code Status: Full Code   Reason for Admission: Osteomyelitis [facility])    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 ([facility] ICD-10-CM: E11.22, N18.9, E11.65  ICD-9-CM: 250.42, 585.9  7/19/2017 Unknown        Diabetic retinopathy ([facility] ICD-10-CM: E11.319  ICD-9-CM: 250.50, 362.01  7/19/2017 Unknown        Anxiety ICD-10-CM: F41.9  ICD-9-CM: 300.00  7/19/2017 Unknown        Leukocytosis ICD-10-CM: Y48.942  ICD-9-CM: 288.60  7/19/2017 Unknown        Renal insufficiency ICD-10-CM: N28.9  ICD-9-CM: 593.9  7/19/2017 Unknown        Osteomyelitis ([facility] ICD-10-CM: M86.9  ICD-9-CM: 730.20  7/18/2017 Unknown        GERD (gastroesophageal reflux disease) ICD-10-CM: K21.9  ICD-9-CM: 530.81  6/18/2014 Yes        HLD (hyperlipidemia) (Chronic) ICD-10-CM: E78.5  ICD-9-CM: 272.4  4/20/2014 Yes        Essential hypertension, benign (Chronic) ICD-10-CM: I10  ICD-9-CM: 401.1  12/3/2013 Yes              BACKGROUND:    Past Medical History:   Past Medical History:   Diagnosis Date    Diabetes ([address]     Gall stones     GERD (gastroesophageal reflux disease)     Hiatal hernia     Hypertension     Mass of abdomen     Pancreatitis          Patient taking anticoagulants no     ASSESSMENT:    Changes in Assessment Throughout Shift:  Patient refused     Patient has Central Line: no Reasons if yes:    Patient has Mari Cath: no Reasons if yes:       Last Vitals:     Vitals: Daily Note     Today's date: 2020  Patient name: David Davis  : 1935  MRN: [id number]  Referring provider: LEEROY Valverde  Dx:   Encounter Diagnosis     ICD-10-CM    1  Open wedge compression fracture of fourth lumbar vertebra with routine healing, subsequent encounter S32 040D                   Subjective: Pt  States her pain level is "5"/10 today  Says she is awaiting appt with Dr Vonnie Church tomorrow to undergo an injection  Objective: See treatment diary below      Assessment: Tolerated treatment Fair+ to Fairly Well with all ther exer performed  Received MHP and manual stretch Well  Intro to ADL's  in kitchen deferred, as pt feels this will only aggravate her back  Pain level decreased by end of today's treatment session  Plan: Con't services 2x/week  Precautions: /Falls, lumbar compression fx, low vision, [place]  Re-eval Date: 2020    Precautions: Falls, lumbar compression fx, low vision, [place]  Re-eval Date: 2020    Date 7  20  7 30 20     Visit Count 16 17 18     FOTO  **NV      Pain In 4-5/10 R LB See RE 5/10     Pain Out 3 5/10 See RE 2/10           Manuals: STM    HS stretch, ITB, piriformis, heel cord stretch B/L to tolerance  Prone quad stretch B/L to tolerance      Manuals 7 24 20  7 30 20      STM  20 min 15 mins as above 15 mins as above     Ham stretch B 4x/20-30"         Groin B 5x/20-30"         Hip Flexors B 4x30"  Light, gentle       Quad Stretch B 4x/20"-30" sidelying       Heel cord stertch B 4x/30"       Manual Stretch Time 25 min                                               Ther Ex 7 24 20  7 30 20     LE strenghtening Pt requests to hold NuStep today due to breathing difficulties Hip ABd  Seated with AH  2x10 Red  Seated march with AH  2x10 red  HS curls  Seated with AH  2x10 yellow Hip ABd  Seated with AH  2x10 Red  Seated march with AH  2x10 red  HS curls  Seated with AH  2x10 yellow     Abdominal stability program 07/18/17 1830 07/18/17 1912 07/18/17 2021 07/19/17 0049   BP: (!) 167/91  144/89 (!) 154/93   Pulse:   91 84   Resp:   18 18   Temp:   98.7 °F (37.1 °C) 99.3 °F (37.4 °C)   SpO2: 100% 100% 100% 100%   Weight:       Height:            IV and DRAINS (will only show if present)   [REMOVED] Peripheral IV 07/18/17 Left Hand-Site Assessment: Clean, dry, & intact  Peripheral IV 07/18/17 Right Antecubital-Site Assessment: Clean, dry, & intact     WOUND (if present)   Wound Type:  yes   Dressing present no     Wound Concerns/Notes:  infection     PAIN    Pain Assessment    Pain Intensity 1: 0 (07/19/17 0723)              Patient Stated Pain Goal: 0  o Interventions for Pain:  none  o Intervention effective:   o Time of last intervention:    o Reassessment Completed: yes      Last 3 Weights:  Last 3 Recorded Weights in this Encounter    07/18/17 1518   Weight: 81.2 kg (179 lb)     Weight change:      INTAKE/OUPUT    Current Shift:      Last three shifts:       LAB RESULTS     Recent Labs      07/19/17   0305  07/18/17   1655   WBC  14.2*  13.9*   HGB  10.0*  10.1*   HCT  28.9*  30.2*   PLT  301  281        Recent Labs      07/19/17   0305  07/18/17   1655   NA  139  141   K  3.1*  4.1   GLU  128*  297*   BUN  17  20*   CREA  1.00  1.13   CA  8.9  8.9   MG  1.8   --    INR  1.1   --        RECOMMENDATIONS AND DISCHARGE PLANNING     Pending tests/procedures/ Plan of Care or Other Needs: MRI/Podiatary consult/ diabetes consult and case managent consult/ angle Brachial index  1. Discharge plan for patient and Needs/Barriers: none    2. Estimated Discharge Date: TBD Posted on Whiteboard in Patients Room:       4. The patient's care plan was reviewed with the oncoming nurse.        \"HEALS\" SAFETY CHECK      Fall Risk    Total Score: 2    Safety Measures: Safety Measures: Bed/Chair alarm on, Bed/Chair-Wheels locked, Bed in low position, Call light within reach, Fall prevention (comment)    A safety check occurred in the SKTC B 4x/10" DC to HEP      DKTC B 4x/10" DC to HEP      LTR side -side   5x/10" DC to HEP      Alt UE with AH 1x15  light AH Prone  1x5, 5"  light AH Prone  1x5, 5"  light AH     Alt LEs with AH 1x15  light AH Prone  1x5, 5"  light AH Prone  1x5, 5"  light AH     Anti-trunk rotation 2 x 10 w/ AH 2 x 10 w/ AH  AROM 2 x 10 w/ AH  AROM     MTP/LTP resume  1 x 20 w/ AH  Red t-band     SLR x 3 Standing  B x 15 with B/L UE support  Standing  B x 15 with B/L UE support     HR/TR B 40x  B 40x     HEP  Progressed for HS curls this date      Ther Activity        Lifting body mechanics   ** ADL/Kitchen focus *DEFERRED*             Gait Training                        Modalities 7 24 20  7 30 20     IFC with      n/a     Ultra Sound   __________ patient's room between off going nurse and oncoming nurse listed above. The safety check included the below items  Area Items   H  High Alert Medications - Verify all high alert medication drips (heparin, PCA, etc.)   E  Equipment - Suction is set up for ALL patients (with arsenio)  - Red plugs utilized for all equipment (IV pumps, etc.)  - WOWs wiped down at end of shift.  - Room stocked with oxygen, suction, and other unit-specific supplies   A  Alarms - Bed alarm is set for fall risk patients  - Ensure chair alarm is in place and activated if patient is up in a chair   L  Lines - Check IV for any infiltration  - Mari bag is empty if patient has a Mari   - Tubing and IV bags are labeled   S  Safety   - Room is clean, patient is clean, and equipment is clean. - Hallways are clear from equipment besides carts. - Fall bracelet on for fall risk patients  - Ensure room is clear and free of clutter  - Suction is set up for ALL patients (with arsenio)  - Hallways are clear from equipment besides carts.    - Isolation precautions followed, supplies available outside room, sign posted     Carissa Yao

## 2020-08-04 ENCOUNTER — OFFICE VISIT (OUTPATIENT)
Dept: PHYSICAL THERAPY | Facility: CLINIC | Age: 85
End: 2020-08-04
Payer: MEDICARE

## 2020-08-04 DIAGNOSIS — S32.040D OPEN WEDGE COMPRESSION FRACTURE OF FOURTH LUMBAR VERTEBRA WITH ROUTINE HEALING, SUBSEQUENT ENCOUNTER: Primary | ICD-10-CM

## 2020-08-04 PROCEDURE — 97110 THERAPEUTIC EXERCISES: CPT

## 2020-08-04 PROCEDURE — 97140 MANUAL THERAPY 1/> REGIONS: CPT

## 2020-08-04 NOTE — PROGRESS NOTES
Daily Note     Today's date: 2020  Patient name: Daniel Gomez  : 1935  MRN: [id number]  Referring provider: LEEROY Anders  Dx:   Encounter Diagnosis     ICD-10-CM    1  Open wedge compression fracture of fourth lumbar vertebra with routine healing, subsequent encounter  S32 040D                   Subjective: Pt states her LB is really bothersome today  Rates the pain level to = "5-6"/10 today  Also reports her breathing is not good today either  Pt  Also voices disappointment re: her appt to undergo injection, that was scheduled for last  , was postponed and now re-scheduled for 20  Objective: See treatment diary below      Assessment: Tolerated treatment Only Fair to Fair+ overall today with performance of ther exer  , due to higher pain level, and breathing issues  Received manual stretch and STM Well however  Pt  Able to manage breathing issues with asst  from inhaler, and also being in private room where she can pull her mask down as needed to catch her breath  Plan: Con't services 2x/week           Precautions: /Falls, lumbar compression fx, low vision, [place]  Re-eval Date: 2020    Precautions: Falls, lumbar compression fx, low vision, [place]  Re-eval Date: 2020    Date  7 30 20 8 4 20    Visit Count 16 17 18 19    FOTO  **NV      Pain In 4-5/10 R LB See RE 5/10 5-6/10    Pain Out 3 5/10 See RE 2/10 3-4/10          Manuals: STM    HS stretch, ITB, piriformis, heel cord stretch B/L to tolerance      Manuals  20  7 30 20 8 4 20     STM  20 min 15 mins as above 15 mins as above 15 mins as above    Ham stretch B 4x/20-30"     B 4x/20-30"    Groin B 5x/20-30"     B 5x/20-30"    Hip Flexors B 4x30"  Light, gentle   B 5x30"      Quad Stretch B 4x/20"-30" sidelying   B 4x/20-30"    Heel cord stertch B 4x/30"   B 5x30"  Light, gentle    Manual Stretch Time 25 min   Total manuals incl STM and stretching =  30 min Ther Ex 7 24 20  7 30 20 8 4 20    LE strenghtening Pt requests to hold NuStep today due to breathing difficulties Hip ABd  Seated with AH  2x10 Red  Seated march with AH  2x10 red  HS curls  Seated with AH  2x10 yellow Hip ABd  Seated with AH  2x10 Red  Seated march with AH  2x10 red  HS curls  Seated with AH  2x10 yellow Hip ABd  Seated  2x10 Red    Seated march with AH  3x10 red    HS curls  Seated   3x10 yellow    Abdominal stability program                  SKTC B 4x/10" DC to HEP      DKTC B 4x/10" DC to HEP      LTR side -side   5x/10" DC to HEP      Alt UE with AH 1x15  light AH Prone  1x5, 5"  light AH Prone  1x5, 5"  light AH *seated  1x10 light AH    Alt LEs with AH 1x15  light AH Prone  1x5, 5"  light AH Prone  1x5, 5"  light AH *seated  1x10 light AH    Anti-trunk rotation 2 x 10 w/ AH 2 x 10 w/ AH  AROM 2 x 10 w/ AH  AROM 2 x 15  AROM    MTP/LTP resume  1 x 20 w/ AH  Red t-band 1 x 20 w/ AH  Red t-band      SLR x 3 Standing  B x 15 with B/L UE support  Standing  B x 15 with B/L UE support Standing  B x 15 with B/L UE support    HR/TR B 40x  B 40x 40x    HEP  Progressed for HS curls this date      Ther Activity        Lifting body mechanics   ** ADL/Kitchen focus *DEFERRED*             Gait Training                        Modalities 7 24 20  7 30 20 8 4 20    IFC with MH     n/a _______    Ultra Sound   __________   _______

## 2020-08-05 ENCOUNTER — TELEPHONE (OUTPATIENT)
Dept: PAIN MEDICINE | Facility: MEDICAL CENTER | Age: 85
End: 2020-08-05

## 2020-08-05 DIAGNOSIS — G89.29 CHRONIC RIGHT-SIDED LOW BACK PAIN WITHOUT SCIATICA: Primary | ICD-10-CM

## 2020-08-05 DIAGNOSIS — M54.50 CHRONIC RIGHT-SIDED LOW BACK PAIN WITHOUT SCIATICA: Primary | ICD-10-CM

## 2020-08-05 NOTE — TELEPHONE ENCOUNTER
S/w pt she is very upset with our office because she was supposed to get an injection on 7/31 with RM but our office called the night before to cx because there was not a nurse  RN provided emotional support  Pt said she was waiting for injection and now can not come in until 8/21  Pt said her pain meds need to be adjusted  She said her low back pain, hip pain and buttocks pain is severe 8/10  Pt said the Percocet is not helping  Pt said she is having to take extra Tylenol just to get by, even though she knows she should not  Pt wants to know if Espinoza Hayes can make changes

## 2020-08-05 NOTE — TELEPHONE ENCOUNTER
Pt is calling requesting a call back from a nurse, no additional info was given pertaining what she wanted to speak about      Pt can be reached at [phone]

## 2020-08-05 NOTE — TELEPHONE ENCOUNTER
S/w pt, informed her of below  Pt she does not think she takes Gabapentin  Pt said she is legally blind so her neighbor organizes all her medications  Pt confused and thought RN was talking about the Percocet at first  RN spelled out the Gabapentin  Pt is going to have her neighbor come over to verify she has been taking the Gabapentin and then call us back

## 2020-08-05 NOTE — TELEPHONE ENCOUNTER
Call transferred from pt's neighbor Carla Le, informed her of medication change to the Gabapentin  RN reiterated that no changes were made to the Percocet, only the Gabapentin

## 2020-08-05 NOTE — TELEPHONE ENCOUNTER
We will double gabapentin  Please advised her to take 200 mg 3 times daily    Thanks,  [employer]

## 2020-08-06 ENCOUNTER — OFFICE VISIT (OUTPATIENT)
Dept: PHYSICAL THERAPY | Facility: CLINIC | Age: 85
End: 2020-08-06
Payer: MEDICARE

## 2020-08-06 DIAGNOSIS — S32.040D OPEN WEDGE COMPRESSION FRACTURE OF FOURTH LUMBAR VERTEBRA WITH ROUTINE HEALING, SUBSEQUENT ENCOUNTER: Primary | ICD-10-CM

## 2020-08-06 PROCEDURE — 97110 THERAPEUTIC EXERCISES: CPT

## 2020-08-06 PROCEDURE — 97140 MANUAL THERAPY 1/> REGIONS: CPT

## 2020-08-06 NOTE — PROGRESS NOTES
Daily Note     Today's date: 2020  Patient name: Kandy Arizmendi  : 1935  MRN: [id number]  Referring provider: LEEROY Rivero  Dx:   Encounter Diagnosis     ICD-10-CM    1  Open wedge compression fracture of fourth lumbar vertebra with routine healing, subsequent encounter  S32 040D                   Subjective: Pt  States her pain level today @ LB is "5-6"/10 @ this present time  Says she called LEEROY yesterday and was advised to "double up" on medication (GABAPENTIN), as per her communications with staff, but hasn't noticed a difference yet re: pain level @ LB  Objective: See treatment diary below      Assessment: Tolerated treatment Fair+ overall with performance of all ther exer  Some breathing issues noted during treatment session, but pt was able to manage with taking rests, and also being in a private room where she could pull her PPE down for a few seconds at a time to catch her breath  Received Manual stretch and STM Well  Plan: Con't services 2x/week         Precautions: /Falls, lumbar compression fx, low vision, [place]  Re-eval Date: 2020    Precautions: Falls, lumbar compression fx, low vision, [place]  Re-eval Date: 2020    Date 7 24 20  7 30 20 8 4 20 8 6 20   Visit Count 16 17 18 19 20   FOTO  **NV      Pain In 4-5/10 R LB See RE 5/10 5-6/10 5-6 w/MEDS   Pain Out 3 10 See RE 2/10 3-4/10 3-4/10         Manuals: STM    HS stretch, ITB, piriformis, heel cord stretch B/L to tolerance      Manuals 7 24 20  7 30 20 8 4 20 8 6 20    STM  20 min 15 mins as above 15 mins as above 15 mins as above 15 mins as above   Ham stretch B 4x/20-30"     B 4x/20-30" B 4x/20-30"   Groin B 5x/20-30"     B 5x/20-30" B 5x/20-30"   Hip Flexors B 4x30"  Light, gentle   B 5x30"   B 5x/30"   Quad Stretch B 4x/20"-30" sidelying   B 4x/20-30" B 4x/20-30"   Heel cord stertch B 4x/30"   B 5x30"  Light, gentle B 5x/30"   Manual Stretch Time 25 min   Total manuals incl STM and stretching =  30 min Total manuals incl STM and stretching =  30 min                                           Ther Ex 7 24 20  7 30 20 8 4 20 8 6 20   LE strenghtening Pt requests to hold NuStep today due to breathing difficulties Hip ABd  Seated with AH  2x10 Red  Seated march with AH  2x10 red  HS curls  Seated with AH  2x10 yellow Hip ABd  Seated with AH  2x10 Red  Seated march with AH  2x10 red  HS curls  Seated with AH  2x10 yellow Hip ABd  Seated  2x10 Red    Seated march with AH  3x10 red    HS curls  Seated   3x10 yellow Hip ABd  Seated  2x10   Blue velcro strap    Seated march with AH  Seated on round yellow foam     Abdominal stability program                  SKTC B 4x/10" DC to HEP      DKTC B 4x/10" DC to HEP      LTR side -side   5x/10" DC to HEP      Alt UE with AH 1x15  light AH Prone  1x5, 5"  light AH Prone  1x5, 5"  light AH *seated  1x10 light AH Seated on round yellow foam  1x15 light AH   Alt LEs with AH 1x15  light AH Prone  1x5, 5"  light AH Prone  1x5, 5"  light AH *seated  1x10 light AH seated  1x15 light AH  Seated on round yellow foam   Anti-trunk rotation 2 x 10 w/ AH 2 x 10 w/ AH  AROM 2 x 10 w/ AH  AROM 2 x 15  AROM 2 x 15  AROM   MTP/LTP resume  1 x 20 w/ AH  Red t-band 1 x 20 w/ AH  Red t-band   1 x 25 w/ AH  Red t-band   SLR x 3 Standing  B x 15 with B/L UE support  Standing  B x 15 with B/L UE support Standing  B x 15 with B/L UE support Standing  B x 15 with B/L UE support   HR/TR B 40x  B 40x 40x 45x   HEP  Progressed for HS curls this date      Ther Activity        Lifting body mechanics   ** ADL/Kitchen focus *DEFERRED*             Gait Training                        Modalities 7 24 20  7 30 20 8 4 20 8 6 20   IFC with MH     n/a _______ _________   Dimple Halo Sound   __________   _______ _________

## 2020-08-10 RX ORDER — PREDNISONE 10 MG/1
TABLET ORAL
Qty: 15 TABLET | Refills: 0 | Status: SHIPPED | OUTPATIENT
Start: 2020-08-10 | End: 2020-08-26

## 2020-08-10 NOTE — TELEPHONE ENCOUNTER
S/w pt, advised of the same  Pt wrote down instructions for tapered dose of prednisone and repeated them back to this RN  Pt verbalized understanding of instructions  Pt states she had her procedure rescheduled for 8/21, however, I do not see it in [address] [place] [address] Rd  Please review and reach out to pt if she is not currently scheduled  Thank you

## 2020-08-10 NOTE — TELEPHONE ENCOUNTER
I sent a prescription for tapering dose of prednisone to her pharmacy to address the acute flare-up of pain that she is experiencing  Please advised her to avoid using NSAIDs while she is on prednisone  has her injection been rescheduled?

## 2020-08-10 NOTE — TELEPHONE ENCOUNTER
S/w pt, expressed that she is still very frustrated that her initial procedure was cancelled and needed to be rescheduled  Pt said, "I have no idea what they are even doing, I wish they would've just gotten it over with"  Explained procedure process to pt, pt verbalized understanding  Pt said she increased gabapentin and it has not had any significant effect on her pain  Pt said she would like to know if there is anything else that can be recommended for her pain  Please advise, thank you

## 2020-08-10 NOTE — TELEPHONE ENCOUNTER
Patient called returning nurses call; please call back at # [phone]  Patient states that her pain medication is not helping her & her pain 7/10  She would like clarification on her next plan of care  Patient ask if she can be reached within an hour   Please advisedavid    Call back# [phone]

## 2020-08-11 ENCOUNTER — OFFICE VISIT (OUTPATIENT)
Dept: PHYSICAL THERAPY | Facility: CLINIC | Age: 85
End: 2020-08-11
Payer: MEDICARE

## 2020-08-11 DIAGNOSIS — S32.040D OPEN WEDGE COMPRESSION FRACTURE OF FOURTH LUMBAR VERTEBRA WITH ROUTINE HEALING, SUBSEQUENT ENCOUNTER: Primary | ICD-10-CM

## 2020-08-11 DIAGNOSIS — D50.9 IRON DEFICIENCY ANEMIA: ICD-10-CM

## 2020-08-11 DIAGNOSIS — J41.0 SIMPLE CHRONIC BRONCHITIS (HCC): Chronic | ICD-10-CM

## 2020-08-11 DIAGNOSIS — I48.0 PAROXYSMAL ATRIAL FIBRILLATION (HCC): ICD-10-CM

## 2020-08-11 PROCEDURE — 97110 THERAPEUTIC EXERCISES: CPT

## 2020-08-11 PROCEDURE — 97140 MANUAL THERAPY 1/> REGIONS: CPT

## 2020-08-11 RX ORDER — IRON POLYSACCHARIDE COMPLEX 150 MG
150 CAPSULE ORAL 2 TIMES DAILY
Qty: 60 CAPSULE | Refills: 5 | Status: SHIPPED | OUTPATIENT
Start: 2020-08-11 | End: 2021-01-01 | Stop reason: SDUPTHER

## 2020-08-11 RX ORDER — FLUTICASONE FUROATE, UMECLIDINIUM BROMIDE AND VILANTEROL TRIFENATATE 100; 62.5; 25 UG/1; UG/1; UG/1
1 POWDER RESPIRATORY (INHALATION) DAILY
Qty: 3 INHALER | Refills: 1 | Status: SHIPPED | OUTPATIENT
Start: 2020-08-11 | End: 2021-01-01 | Stop reason: SDUPTHER

## 2020-08-11 NOTE — PROGRESS NOTES
Daily Note     Today's date: 2020  Patient name: Gianfranco Tam  : 1935  MRN: [id number]  Referring provider: Harriette Soulier, CRNP  Dx:   Encounter Diagnosis     ICD-10-CM    1  Open wedge compression fracture of fourth lumbar vertebra with routine healing, subsequent encounter  S32 040D                   Subjective:  Pt  States she was prescribed PREDNISONE by [address] yesterday for on-going, unmanageable Pain sx @ [facility]  Says she is already noticing good effects from the medication  Says her pain level is down to "3-4"/10  Objective: See treatment diary below  *Some ther exer held today due to transportation arriving earlier today for pickup  Assessment: Tolerated treatment Only Fair with performance of ther exer due to breathing challenges  Received manual stretch for B LE's, and STM Well  Plan: Con't services 2x/week            Precautions: /Falls, lumbar compression fx, low vision, [place]  Re-eval Date: 2020    Precautions: Falls, lumbar compression fx, low vision, [place]  Re-eval Date: 2020    Date 8 11 20 7/28 7 30 20 8 4 20 8 6 20   Visit Count 21 17 18 19 20   FOTO  **NV      Pain In 3-4/10 R LB See RE 5/10 5-6/10 5-6 w/MEDS   Pain Out 3/10 See RE 2/10 3-4/10 3-4/10         Manuals: STM    HS stretch, ITB, piriformis, heel cord stretch B/L to tolerance      Manuals 8 11 20  7 30 20 8 4 20 8 6 20    STM  20 min 15 mins as above 15 mins as above 15 mins as above 15 mins as above   Ham stretch B 4x/20-30"     B 4x/20-30" B 4x/20-30"   Groin B 5x/20-30"     B 5x/20-30" B 5x/20-30"   Hip Flexors B 4x30"  Light, gentle   B 5x30"   B 5x/30"   Quad Stretch B 4x/20"-30" sidelying   B 4x/20-30" B 4x/20-30"   Heel cord stertch B 4x/30"   B 5x30"  Light, gentle B 5x/30"   Manual Stretch Time 25 min   Total manuals incl STM and stretching =  30 min Total manuals incl STM and stretching =  30 min                                           Ther Ex 8 11 20  7 30 20 8 4 20 8 6 20   LE strenghtening Held today due to time constraint with transportation arriving earlier for  Hip ABd  Seated with AH  2x10 Red  Seated march with AH  2x10 red  HS curls  Seated with AH  2x10 yellow Hip ABd  Seated with AH  2x10 Red  Seated march with AH  2x10 red  HS curls  Seated with AH  2x10 yellow Hip ABd  Seated  2x10 Red    Seated march with AH  3x10 red    HS curls  Seated   3x10 yellow Hip ABd  Seated  2x10   Blue velcro strap    Seated march with AH  Seated on round yellow foam     Abdominal stability program          NuStep L1 x 8 min  Rest periods required t/o       SKTC B 5x/10" DC to HEP      DKTC B 5x/10" DC to HEP      LTR side -side   10x/10" DC to HEP      Alt UE with AH Resume NV Prone  1x5, 5"  light AH Prone  1x5, 5"  light AH *seated  1x10 light AH Seated on round yellow foam  1x15 light AH   Alt LEs with AH Resume NV Prone  1x5, 5"  light AH Prone  1x5, 5"  light AH *seated  1x10 light AH seated  1x15 light AH  Seated on round yellow foam   Anti-trunk rotation  2 x 10 w/ AH  AROM 2 x 10 w/ AH  AROM 2 x 15  AROM 2 x 15  AROM   MTP/LTP resume  1 x 20 w/ AH  Red t-band 1 x 20 w/ AH  Red t-band   1 x 25 w/ AH  Red t-band   SLR x 3 Standing  B x 15 with B/L UE support  Standing  B x 15 with B/L UE support Standing  B x 15 with B/L UE support Standing  B x 15 with B/L UE support   HR/TR B 45x  B 40x 40x 45x   HEP  Progressed for HS curls this date      Ther Activity        Lifting body mechanics   ** ADL/Kitchen focus *DEFERRED*             Gait Training                        Modalities 8 11 20  7 30 20 8 4 20 8 6 20   IFC with MH __________    n/a _______ _________   Oral [place] Sound __________  __________   _______ _________

## 2020-08-14 ENCOUNTER — OFFICE VISIT (OUTPATIENT)
Dept: PHYSICAL THERAPY | Facility: CLINIC | Age: 85
End: 2020-08-14
Payer: MEDICARE

## 2020-08-14 DIAGNOSIS — J96.11 CHRONIC RESPIRATORY FAILURE WITH HYPOXIA (HCC): ICD-10-CM

## 2020-08-14 DIAGNOSIS — S32.040D OPEN WEDGE COMPRESSION FRACTURE OF FOURTH LUMBAR VERTEBRA WITH ROUTINE HEALING, SUBSEQUENT ENCOUNTER: Primary | ICD-10-CM

## 2020-08-14 PROCEDURE — 97110 THERAPEUTIC EXERCISES: CPT

## 2020-08-14 PROCEDURE — 97140 MANUAL THERAPY 1/> REGIONS: CPT

## 2020-08-14 NOTE — PROGRESS NOTES
Daily Note     Today's date: 2020  Patient name: Mirna Aleman  : 1935  MRN: [id number]  Referring provider: LEEROY Gilbert  Dx:   Encounter Diagnosis     ICD-10-CM    1  Open wedge compression fracture of fourth lumbar vertebra with routine healing, subsequent encounter  S32 040D                   Subjective: Pt  states she feels pretty good today re: her LB   States she has "No Pain"  Says she feels the PREDNISONE has been very helpful  Objective: See treatment diary below      Assessment: Tolerated treatment much better overall today vs last treatment session with performance and tolerance to ther exer  Plan: Con't services 2x/week           Precautions: /Falls, lumbar compression fx, low vision, [place]  Re-eval Date: 2020    Precautions: Falls, lumbar compression fx, low vision, [place]  Re-eval Date: 2020    Date 8 11 20 8 14 20 7 30 20 8 4 20 8 6 20   Visit Count 21 22 18 19 20   FOTO        Pain In 3-4/10 R LB 0/10 5/10 5-6/10 5-6 w/MEDS   Pain Out 3/10 0/10 2/10 3-4/10 3-10         Manuals: STM    HS stretch, ITB, piriformis, heel cord stretch B/L to tolerance      Manuals 8 11 20 8 14 20 7 30 20 8 4 20 8 6 20    STM  20 min STM  10 min 15 mins as above 15 mins as above 15 mins as above   Ham stretch B 4x/20-30"   B 5x/20-30"  B 4x/20-30" B 4x/20-30"   Groin B 5x/20-30"   B 5x/20-30"  B 5x/20-30" B 5x/20-30"   Hip Flexors B 4x30"  Light, gentle B 5x/30"  B 5x30"   B 5x/30"   Quad Stretch B 4x/20"-30" sidelying B 4x/20-30"  B 4x/20-30" B 4x/20-30"   Heel cord stertch B 4x/30" B 5x/30"  B 5x30"  Light, gentle B 5x/30"   Manual Stretch Time 25 min Total manuals incl STM and stretching =  20 min  Total manuals incl STM and stretching =  30 min Total manuals incl STM and stretching =  30 min                                           Ther Ex 8 11 20 8 14 20 7 30 20 8 4 20 8 6 20   LE strenghtening Held today due to time constraint with transportation arriving earlier for  Hip ABd  Seated  2x10   Blue velcro strap    Seated march with AH  Seated on round yellow foam 30x Hip ABd  Seated with AH  2x10 Red  Seated march with AH  2x10 red  HS curls  Seated with AH  2x10 yellow Hip ABd  Seated  2x10 Red    Seated march with AH  3x10 red    HS curls  Seated   3x10 yellow Hip ABd  Seated  2x10   Blue velcro strap    Seated march with AH  Seated on round yellow foam     Abdominal stability program          NuStep L1 x 8 min  Rest periods required t/o L1 x 8 min      SKTC B 5x/10"       DKTC B 5x/10"       LTR side -side   10x/10" side -side   10x/10"      Alt UE with AH Resume NV Seated on round yellow foam  1x20 light AH Prone  1x5, 5"  light AH *seated  1x10 light AH Seated on round yellow foam  1x15 light AH   Alt LEs with AH Resume NV seated  1x20 light AH  Seated on round yellow foam Prone  1x5, 5"  light AH *seated  1x10 light AH seated  1x15 light AH  Seated on round yellow foam   Anti-trunk rotation  2 x 15 w/ AH  AROM 2 x 10 w/ AH  AROM 2 x 15  AROM 2 x 15  AROM   MTP/LTP resume 1 x 20 w/ AH  Green t-band 1 x 20 w/ AH  Red t-band 1 x 20 w/ AH  Red t-band   1 x 25 w/ AH  Red t-band   SLR x 3 Standing  B x 15 with B/L UE support Standing  B x 20 with B/L UE support Standing  B x 15 with B/L UE support Standing  B x 15 with B/L UE support Standing  B x 15 with B/L UE support   HR/TR B 45x B 45x B 40x 40x 45x   HEP        Ther Activity        Lifting body mechanics   ** ADL/Kitchen focus *DEFERRED*             Gait Training                        Modalities 8 11 20 8 14 25 7 30 20 8 4 20 8 6 20   IFC with MH __________   __________ n/a _______ _________   Tricia [place] Sound __________ __________ __________   _______ _________

## 2020-08-16 RX ORDER — PREDNISONE 1 MG/1
5 TABLET ORAL DAILY
Qty: 30 TABLET | Refills: 1 | Status: SHIPPED | OUTPATIENT
Start: 2020-08-16 | End: 2020-09-04 | Stop reason: SDUPTHER

## 2020-08-18 ENCOUNTER — APPOINTMENT (OUTPATIENT)
Dept: PHYSICAL THERAPY | Facility: CLINIC | Age: 85
End: 2020-08-18
Payer: MEDICARE

## 2020-08-18 ENCOUNTER — TELEPHONE (OUTPATIENT)
Dept: OBGYN CLINIC | Facility: CLINIC | Age: 85
End: 2020-08-18

## 2020-08-18 ENCOUNTER — TELEPHONE (OUTPATIENT)
Dept: PAIN MEDICINE | Facility: CLINIC | Age: 85
End: 2020-08-18

## 2020-08-18 DIAGNOSIS — G89.4 CHRONIC PAIN SYNDROME: ICD-10-CM

## 2020-08-18 DIAGNOSIS — S32.040D COMPRESSION FRACTURE OF L4 VERTEBRA WITH ROUTINE HEALING, SUBSEQUENT ENCOUNTER: ICD-10-CM

## 2020-08-18 NOTE — TELEPHONE ENCOUNTER
Pt called in to speak with Tammy Duncan in regard to her back  She is complaining that she has had to postpone her multiple procedures  And she is would like to request that something be prescribe until then  Please be advise thank you        Please call patient back @ [phone]

## 2020-08-18 NOTE — TELEPHONE ENCOUNTER
Left message on patients voicemail regarding the need to reschedule her procedure for a Right Sacroiliac Joint Injection with [facility] (Dr Deon Zhu)   Advised of dates available 9/11 and 9/18   Contact information given Orthopedics [phone] ask for Tommy Fletcher

## 2020-08-18 NOTE — TELEPHONE ENCOUNTER
S/w pt who is extremely upset because her procedure with RM has been rescheduled again and she was told 9/11 was the earliest it can be done  Pt is willing to travel elsewhere for injection and repeatedly states "I want another doctor " Pt states she is going to lose her job that is her only source of income due to pain  Pt states she has been patient but can not wait another 3-4 weeks  Pt was given steroid taper when last injection was rescheduled  Pt states it did not help until the end and it did not last  Pt is asking what else she can do and if she can have her injection somewhere else sooner?   Please advise

## 2020-08-18 NOTE — TELEPHONE ENCOUNTER
S/w pt she is very upset with our office because she was supposed to get an injection on 8/21 with Dr Hernesto Rosario, but our office called to reschedule her to either 9/11 or 9/18     Pt said she was waiting for injection and can not wait any longer  She states, "This is the second time he has done this to me! I want you to find me a new Doctor!"   I apologized for any inconvenience, and I could pass this information along to my manager who can give her a call and speak to her about moving her injection up to [facility] if there is a possibility of getting in any earlier  Pt said her pain meds need to be refilled as well  She said her low back pain, hip pain and buttocks pain is severe  8/10

## 2020-08-19 NOTE — TELEPHONE ENCOUNTER
I called patient's cell phone number  She did not answer, but I left her a message stating that Rhea Shah will be calling her tomorrow to see if we can bring her in sooner at [address] [facility]

## 2020-08-19 NOTE — TELEPHONE ENCOUNTER
Pt called again very upset that no one called her back  She needs to get a injection as soon as possible  She states she can not take the pain anymore  This is two times we cx a injection on her  Pt would like if someone can please give her a call back today  She called early this morning and now 3:30 and no call      Pt can be reached at [phone]

## 2020-08-19 NOTE — TELEPHONE ENCOUNTER
Noé Blum,   do we have something sooner at [facility]? ?  Thanks,  [employer]

## 2020-08-20 ENCOUNTER — TELEPHONE (OUTPATIENT)
Dept: PAIN MEDICINE | Facility: CLINIC | Age: 85
End: 2020-08-20

## 2020-08-20 ENCOUNTER — EVALUATION (OUTPATIENT)
Dept: PHYSICAL THERAPY | Facility: CLINIC | Age: 85
End: 2020-08-20
Payer: MEDICARE

## 2020-08-20 DIAGNOSIS — R29.898 WEAKNESS OF BOTH LOWER EXTREMITIES: ICD-10-CM

## 2020-08-20 DIAGNOSIS — S32.040D OPEN WEDGE COMPRESSION FRACTURE OF FOURTH LUMBAR VERTEBRA WITH ROUTINE HEALING, SUBSEQUENT ENCOUNTER: Primary | ICD-10-CM

## 2020-08-20 PROCEDURE — 97140 MANUAL THERAPY 1/> REGIONS: CPT | Performed by: PHYSICAL THERAPIST

## 2020-08-20 RX ORDER — GABAPENTIN 300 MG/1
300 CAPSULE ORAL 3 TIMES DAILY
Qty: 90 CAPSULE | Refills: 1 | Status: SHIPPED | OUTPATIENT
Start: 2020-08-20 | End: 2020-09-04 | Stop reason: SDUPTHER

## 2020-08-20 NOTE — PROGRESS NOTES
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 if she is in this much pain  Did have what sounds like a steroid dose, but this was very short lived  Ongoing pain and patient reports so frustrated and painful she just cries  Poor sleep, no more than 2 hours a night consecutive  Objective: See treatment diary below      Assessment: Tolerated treatment poor  Patient demonstrated fatigue post treatment and would benefit from continued PT  Very pain limited this date, unable to tolerate any TE this date  She has decreased tolerance for stretching these dates  Very slow and painful transitional movements, increased forward flexion 2/2 pain  Patient may benefit from home stim unit with back brace for support  Plan: Continue per plan of care        Precautions: /Falls, lumbar compression fx, low vision, [place]  Re-eval Date: 2020    Date 8  8 14 20      Visit Count 21 22 23     FOTO        Pain In 3-4/10 R LB 0/10 8/10     Pain Out 3/10 0/10 6/10           Manuals: STM, "X" hand stretch, gentle IR/ER of hips b/l in prone for rhythmic inhibition  HS stretch, ITB, piriformis, heel cord stretch B/L to tolerance - NP  Quad stretch prone to tolerance B/L      Manuals 8 11 20 8 14 20 Not able to tolerate TE this date 2/2 pain      STM  20 min STM  10 min Total time 35 mins of MT as listed above     Ham stretch B 4x/20-30"   B 5x/20-30"      Groin B 5x/20-30"   B 5x/20-30"      Hip Flexors B 4x30"  Light, gentle B 5x/30"      Quad Stretch B 4x/20"-30" sidelying B 4x/20-30"      Heel cord stertch B 4x/30" B 5x/30"      Manual Stretch Time 25 min Total manuals incl STM and stretching =  20 min                                              Ther Ex 8 11 20 8 14 20      LE strenghtening Held today due to time constraint with transportation arriving earlier for  Hip ABd  Seated  2x10   Blue velcro strap    Seated march with AH  Seated on round yellow foam 30x      Abdominal stability program          NuStep L1 x 8 min  Rest periods required t/o L1 x 8 min      SKTC B 5x/10"       DKTC B 5x/10"       LTR side -side   10x/10" side -side   10x/10"      Alt UE with AH Resume NV Seated on round yellow foam  1x20 light AH      Alt LEs with AH Resume NV seated  1x20 light AH  Seated on round yellow foam      Anti-trunk rotation  2 x 15 w/ AH  AROM      MTP/LTP resume 1 x 20 w/ AH  Green t-band      SLR x 3 Standing  B x 15 with B/L UE support Standing  B x 20 with B/L UE support      HR/TR B 45x B 45x      HEP        Ther Activity        Lifting body mechanics                Gait Training                        Modalities 8 11 20 8 14 25      IFC with MH __________   __________ MH x 10 mins in prone with LEs elevated     Ultra Sound __________ __________      heat pack x 10 minutes prone to LB at end of session to pt tolerance  Pt was instructed to verbalize any pain/discomfort to PT during/following tx  Skin was intact pre/post tx without adverse reaction noted

## 2020-08-20 NOTE — TELEPHONE ENCOUNTER
S/W pt and advised of increase Gabapentin and new RX at [facility]  Pt will await CB from [place]    Please reach back out to pt to schedule

## 2020-08-20 NOTE — TELEPHONE ENCOUNTER
Patient is in a lot of pain, I made her aware BK called her and left her a message below about, Fely Sweeney  trying to get her a sooner date for a procedure  She will be home in 1 hour Home phone is best contact [phone]    She is asking for pain medication while she waits to be scheduled  Patients mobile doesn't have good reception  Please advise  Thank you

## 2020-08-20 NOTE — TELEPHONE ENCOUNTER
Patient is calling in stating that she got Mayte's message and now she needs medication  She also said that her procedure appt for 8/21/20 is supposed to be canceled  Please follow up if you need to with patient  I was not really able to speak and was also confused as to what she with the conversation   I did not to clarify somethings

## 2020-08-20 NOTE — TELEPHONE ENCOUNTER
We will plan to increase the gabapentin again  I previously increased to 200 mg 3 times daily  Today, I sent a prescription to her pharmacy for  Gabapentin 300 mg 3 times daily  Please remind her that it is a 300 mg pill, so that she only needs to take 1 pill 3 times daily

## 2020-08-21 NOTE — TELEPHONE ENCOUNTER
I called patient and spoke to her regarding her procedure  I have given her two dates to pick from  They are 9/2 and 9/3  Patient is calling to see if she can get a ride to [facility]  She will call me back to confirm  I did give her the call centers phone number and explained to her that the call center will send me a message and I will get back to her

## 2020-08-24 ENCOUNTER — TELEPHONE (OUTPATIENT)
Dept: PAIN MEDICINE | Facility: CLINIC | Age: 85
End: 2020-08-24

## 2020-08-24 DIAGNOSIS — S32.040D COMPRESSION FRACTURE OF L4 VERTEBRA WITH ROUTINE HEALING, SUBSEQUENT ENCOUNTER: ICD-10-CM

## 2020-08-24 DIAGNOSIS — G89.4 CHRONIC PAIN SYNDROME: ICD-10-CM

## 2020-08-24 RX ORDER — OXYCODONE HYDROCHLORIDE AND ACETAMINOPHEN 5; 325 MG/1; MG/1
1 TABLET ORAL EVERY 6 HOURS PRN
Qty: 120 TABLET | Refills: 0 | Status: SHIPPED | OUTPATIENT
Start: 2020-08-24 | End: 2020-10-12 | Stop reason: SDUPTHER

## 2020-08-24 NOTE — TELEPHONE ENCOUNTER
Sw patient and advised prescription has been sent to the pharmacy  Advised patient to make sure she is taking her medication as prescribed  Patient verbalizes understanding

## 2020-08-24 NOTE — TELEPHONE ENCOUNTER
Pt contacted Call Center requested refill of their medication  Patient request a call with status on filling medication  Medication Name: Percocet      Dosage of Med: 5 - 325 mg      Frequency of Med: Take 1 tablet by mouth every 6 (six) hours as needed for moderate pain      Remaining Medication: 0      Pharmacy and Location: [facility] on file        Pt  Preferred Callback Phone Number: [phone]       Thank you

## 2020-08-24 NOTE — TELEPHONE ENCOUNTER
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

## 2020-08-24 NOTE — TELEPHONE ENCOUNTER
I sent prescription to her pharmacy dated for today  Please advise her to take them as prescribed  We will not refill them early again

## 2020-08-25 ENCOUNTER — OFFICE VISIT (OUTPATIENT)
Dept: PHYSICAL THERAPY | Facility: CLINIC | Age: 85
End: 2020-08-25
Payer: MEDICARE

## 2020-08-25 DIAGNOSIS — S32.040D OPEN WEDGE COMPRESSION FRACTURE OF FOURTH LUMBAR VERTEBRA WITH ROUTINE HEALING, SUBSEQUENT ENCOUNTER: Primary | ICD-10-CM

## 2020-08-25 PROCEDURE — 97140 MANUAL THERAPY 1/> REGIONS: CPT

## 2020-08-25 PROCEDURE — 97110 THERAPEUTIC EXERCISES: CPT

## 2020-08-25 NOTE — PROGRESS NOTES
Daily Note     Today's date: 2020  Patient name: Mirna Aleman  : 1935  MRN: [id number]  Referring provider: LEEROY Gilbert  Dx:   Encounter Diagnosis     ICD-10-CM    1  Open wedge compression fracture of fourth lumbar vertebra with routine healing, subsequent encounter  S32 040D                   Subjective:  Pt  States her breathing is not good today  Says she was going to cx today's therapy appt  , but decided to come  Says she will try her best to   complete the exercises  Objective: See treatment diary below      Assessment: Tolerated treatment Poor+ to Fair range with performance of ther exer due to breathing issue  Pt performed better after being in a private room where she could unmask from time-time, however she requested to hold on select exercises just for today  Received Manuals and MHP applic well  Plan:  Plan is for re-eval upon next treatment visit  Con't services as per POC/Goals           Precautions: /Falls, lumbar compression fx, low vision, [place]  Re-eval Date: 2020    Date 8  8 14 20 8 8 25 20    Visit Count 21 22 23 24    FOTO        Pain In 3-10 R LB 0/10 8/10 7/10    Pain Out 3/10 0/10 6/10 5/10          Manuals: STM, "X" hand stretch, 15 min   HS stretch, ITB, piriformis, groin, heel cord stretch B/L to tolerance - 10 min        Manuals 8  8 14 20 Not able to tolerate TE this date 2/2 pain 8 25 20     STM  20 min STM  10 min Total time 35 mins of MT as listed above STM  25 min    Ham stretch B 4x/20-30"   B 5x/20-30"  B 5x/20-30"    Groin B 5x/20-30"   B 5x/20-30"  B 5x/20-30"    Hip Flexors B 4x30"  Light, gentle B 5x/30"  B 5x/20-30"    Quad Stretch B 4x/20"-30" sidelying B 4x/20-30"  B 5x/20-30"    Heel cord stertch B 4x/30" B 5x/30"  B 5x/20-30"    Manual Stretch Time 25 min Total manuals incl STM and stretching =  20 min  Total manuals incl STM and stretching =  25 min                                            Ther Ex 8 11 20 8 14 20 8  25 20    LE strenghtening Held today due to time constraint with transportation arriving earlier for  Hip ABd  Seated  2x10   Blue velcro strap    Seated march with AH  Seated on round yellow foam 30x  Hip ABd  Seated  2x10   Blue velcro strap    Seated march with AH  Seated on round yellow foam 30x    Abdominal stability program          NuStep L1 x 8 min  Rest periods required t/o L1 x 8 min  L1 x 4 5 min    SKTC B 5x/10"   B 5x/10"      DKTC B 5x/10"   B 5x/10"      LTR side -side   10x/10" side -side   10x/10"  side -side   10x/10"    Alt UE with AH Resume NV Seated on round yellow foam  1x20 light AH  Hold today    Alt LEs with AH Resume NV seated  1x20 light AH  Seated on round yellow foam  Hold today    Anti-trunk rotation  2 x 15 w/ AH  AROM  Hold today    MTP/LTP resume 1 x 20 w/ AH  Green t-band  Hold today    SLR x 3 Standing  B x 15 with B/L UE support Standing  B x 20 with B/L UE support  Standing  B x 20 with B/L UE support    HR/TR B 45x B 45x  B 45x    HEP        Ther Activity        Lifting body mechanics                Gait Training                        Modalities 8 11 20 8 14 25  8 25 20    IFC with MH __________   __________ MH x 10 mins in prone with LEs elevated MHP x 8 min while on Nustep  (4 5 min active, 3 5 just seated)    Ultra Sound __________ __________      heat pack x 8 min total   Pt was instructed to verbalize any pain/discomfort to PT during/following tx  Skin was intact pre/post tx without adverse reaction noted

## 2020-08-26 ENCOUNTER — OFFICE VISIT (OUTPATIENT)
Dept: INTERNAL MEDICINE CLINIC | Facility: CLINIC | Age: 85
End: 2020-08-26
Payer: MEDICARE

## 2020-08-26 VITALS
BODY MASS INDEX: 25.68 KG/M2 | OXYGEN SATURATION: 95 % | TEMPERATURE: 98.6 F | RESPIRATION RATE: 18 BRPM | WEIGHT: 130.8 LBS | SYSTOLIC BLOOD PRESSURE: 122 MMHG | HEART RATE: 83 BPM | HEIGHT: 60 IN | DIASTOLIC BLOOD PRESSURE: 64 MMHG

## 2020-08-26 DIAGNOSIS — J41.0 SIMPLE CHRONIC BRONCHITIS (HCC): Primary | ICD-10-CM

## 2020-08-26 DIAGNOSIS — Z23 NEEDS FLU SHOT: ICD-10-CM

## 2020-08-26 PROCEDURE — 96372 THER/PROPH/DIAG INJ SC/IM: CPT | Performed by: INTERNAL MEDICINE

## 2020-08-26 PROCEDURE — 99214 OFFICE O/P EST MOD 30 MIN: CPT | Performed by: INTERNAL MEDICINE

## 2020-08-26 PROCEDURE — 94640 AIRWAY INHALATION TREATMENT: CPT | Performed by: INTERNAL MEDICINE

## 2020-08-26 PROCEDURE — G0008 ADMIN INFLUENZA VIRUS VAC: HCPCS | Performed by: INTERNAL MEDICINE

## 2020-08-26 PROCEDURE — A7003 NEBULIZER ADMINISTRATION SET: HCPCS | Performed by: INTERNAL MEDICINE

## 2020-08-26 PROCEDURE — 90662 IIV NO PRSV INCREASED AG IM: CPT | Performed by: INTERNAL MEDICINE

## 2020-08-26 RX ORDER — IPRATROPIUM BROMIDE AND ALBUTEROL SULFATE 2.5; .5 MG/3ML; MG/3ML
3 SOLUTION RESPIRATORY (INHALATION)
Status: DISCONTINUED | OUTPATIENT
Start: 2020-08-26 | End: 2020-08-26

## 2020-08-26 RX ORDER — DEXAMETHASONE SODIUM PHOSPHATE 4 MG/ML
4 INJECTION, SOLUTION INTRA-ARTICULAR; INTRALESIONAL; INTRAMUSCULAR; INTRAVENOUS; SOFT TISSUE ONCE
Status: COMPLETED | OUTPATIENT
Start: 2020-08-26 | End: 2020-08-26

## 2020-08-26 RX ORDER — IPRATROPIUM BROMIDE AND ALBUTEROL SULFATE 2.5; .5 MG/3ML; MG/3ML
3 SOLUTION RESPIRATORY (INHALATION) ONCE
Status: COMPLETED | OUTPATIENT
Start: 2020-08-26 | End: 2020-08-26

## 2020-08-26 RX ADMIN — DEXAMETHASONE SODIUM PHOSPHATE 4 MG: 4 INJECTION, SOLUTION INTRA-ARTICULAR; INTRALESIONAL; INTRAMUSCULAR; INTRAVENOUS; SOFT TISSUE at 11:40

## 2020-08-26 RX ADMIN — IPRATROPIUM BROMIDE AND ALBUTEROL SULFATE 3 ML: 2.5; .5 SOLUTION RESPIRATORY (INHALATION) at 12:14

## 2020-08-27 ENCOUNTER — EVALUATION (OUTPATIENT)
Dept: PHYSICAL THERAPY | Facility: CLINIC | Age: 85
End: 2020-08-27
Payer: MEDICARE

## 2020-08-27 DIAGNOSIS — R29.898 WEAKNESS OF BOTH LOWER EXTREMITIES: ICD-10-CM

## 2020-08-27 DIAGNOSIS — S32.040D OPEN WEDGE COMPRESSION FRACTURE OF FOURTH LUMBAR VERTEBRA WITH ROUTINE HEALING, SUBSEQUENT ENCOUNTER: Primary | ICD-10-CM

## 2020-08-27 PROCEDURE — 97140 MANUAL THERAPY 1/> REGIONS: CPT | Performed by: PHYSICAL THERAPIST

## 2020-08-27 PROCEDURE — 97110 THERAPEUTIC EXERCISES: CPT | Performed by: PHYSICAL THERAPIST

## 2020-08-27 NOTE — PROGRESS NOTES
PT Re-Evaluation  and PT Discharge    Today's date: 2020  Patient name: Kenney Valentin  : 1935  MRN: [id number]  Referring provider: LEEROY Campbell  Dx:   Encounter Diagnosis     ICD-10-CM    1  Open wedge compression fracture of fourth lumbar vertebra with routine healing, subsequent encounter  S32 040D    2  Weakness of both lower extremities  [id number]                   Assessment  Assessment details: Kenney Valentin is a 82 y o  female presenting to outpatient physical therapy with diagnosis of open wedge compression fracture of fourth lumbar vertebra with routine healing, weakness of both lower extremities  She has significantly decreased pain levels compared to [facility]  Patient has had several delays in pain management  She notes that pain has increased, bilateral LB, bilateral hips, into LEs intermittently  Patient reports she does not think pain medication is working  Also notes she frustrated that she will not be able to return to work as she was hoping to  Plans for RTW 2020  She remains motivated to improve  Upcoming injection scheduled  Overall, patient has had an apparent decline in status, increase in pain over the last 2 weeks with no known etiology  She has follow ups scheduled with PCP and pain management  Impairments: abnormal coordination, abnormal gait, abnormal muscle firing, abnormal muscle tone, abnormal or restricted ROM, activity intolerance, impaired physical strength, lacks appropriate home exercise program, pain with function, safety issue and poor posture     Symptom irritability: highBarriers to therapy: Limited vision  Limited driving  Understanding of Dx/Px/POC: good   Prognosis: good    Goals  STGs to be achieved in 4 weeks:  1  Pt to demonstrate reduced subjective pain rating "at worst" by at least 2-3 points from Initial Eval in order to allow for reduced pain with ADLs and improved functional activity tolerance  - Worsening status  2   Pt to demonstrate increased AROM of bilateral LEs by at least 5-10 degrees in order to allow for greater ease and independence with ADLs and functional mobility  - worsening status  3  Pt to demonstrate increased MMT of bilateral LEs by at least 1/2-1 grade in order to improve safety and stability with ADLs and functional mobility  - MET  4  Patient will demonstrate independence with basic HEP for supine LE strengthening - Progressed  LTGs to be achieved in 6-8 weeks:  1  Pt will be I with HEP in order to continue to improve quality of life and independence and reduce risk for re-injury  - MET  2  Pt to demonstrate return to full duty at work without limitations or restrictions  - ONGOING  3  Pt to demonstrate improved function as noted by achieving or exceeding predicted score on FOTO outcomes assessment tool - MET  4  Pt to demonstrate proper body mechanics for lifting from floor to waist - ONGOING      Plan  Patient would benefit from: skilled physical therapy  Other planned modality interventions: Modalities prn for symptom management  Planned therapy interventions: manual therapy, neuromuscular re-education, therapeutic exercise, therapeutic activities and home exercise program  Frequency: 2x week  Duration in visits: 8  Duration in weeks: 4  Plan of Care beginning date: 8/27/2020  Plan of Care expiration date: 9/26/2020  Treatment plan discussed with: patient        Subjective Evaluation    History of Present Illness  Mechanism of injury: UPDATE:  Patient reports she was doing well, but with the delay in intervention with pain management, her pain has returned and gotten worse  She notes that she had pain meds, but they didn't work  She was really hoping to get the injection so she could get back to work  Return to work scheduled 9/8/2020, injection 9/2//2020  Notes that right now the only time she gets some relief is after PT for a period of time        Patient 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  She was on a back road when it happened  She notes that the pain in her back has been bad since  That happened in Sept or October sometime  At the same time she was hospitalized for abdominal issues  This resulted in surgery of her abdomen  Back pain and leg weakness continues  She did get discharged from the hospital and had home health until about 2 weeks ago  She saw orthopedics and was ultimately starting with pain medications  She notes some relief with pain meds, but legs feel weak and back is still there  More of a slight pain/soreness at this time  Functional limitations:  Limited vision  Limited walking endurance  Limited stair negotiation  Limited standing time 5 to 10 minutes  Limited bending or lifting (limitations related to abdominal surgery)  Limited ADLs and IADLs related to LE fatigue  Quality of life: good    Pain  Current pain ratin  At best pain ratin  At worst pain ratin  Location: Back and leg pain - both sides of spine, bilateral legs  Quality: pulling, tight and sharp  Relieving factors: medications and rest  Aggravating factors: walking, standing, stair climbing, sitting and lifting  Progression: worsening    Social Support  Steps to enter house: yes  Stairs in house: yes   Lives in: multiple-level home  Lives with: alone    Hand dominance: right      Diagnostic Tests  X-ray: abnormal  Treatments  Previous treatment: physical therapy and medication  Current treatment: physical therapy  Patient Goals  Patient goals for therapy: increased strength, decreased pain, increased motion, return to sport/leisure activities, return to work and independence with ADLs/IADLs          Objective     Concurrent Complaints  Positive for night pain, disturbed sleep and history of trauma  Additional Special Questions  Pain has been worsening sleep for the past two weeks      Postural Observations  Seated posture: poor  Standing posture: poor    Additional Postural Observation Details  Increasing forward flexed posture in both sitting and standing    Tenderness     Additional Tenderness Details  TTP bilateral lumbar PVMs - worsening without clear etiology x 2 weeks  (+) increased right rib hump - resolved  (+) right > left iliac crest elevation - resolved  TTP at bilateral L3-5 PVMs midline - ONGOING    Neurological Testing     Sensation     Lumbar   Left   Intact: proprioception  Diminished: light touch    Right   Intact: proprioception  Diminished: light touch    Reflexes   Left   Patellar (L4): normal (2+)    Right   Patellar (L4): normal (2+)    Additional Neurological Details  Diminished in toes/feet only at this time    Active Range of Motion     Lumbar   Flexion:  with pain Restriction level: moderate  Extension:  with pain Restriction level: moderate  Left lateral flexion:  with pain Restriction level: moderate  Right lateral flexion:  with pain Restriction level: moderate  Left rotation:  with pain Restriction level: minimal  Right rotation:  with pain Restriction level: minimal    Additional Active Range of Motion Details  Pain ongoing with variable intensity, worsening x 2 weeks  Patient has been in contact with pain management  Strength/Myotome Testing     Left Hip   Planes of Motion   Flexion: 3  Extension: 3  Abduction: 3  Adduction: 3+    Right Hip   Planes of Motion   Flexion: 3  Extension: 3-  Abduction: 3  Adduction: 3+    Left Knee   Flexion: 4-  Extension: 4    Right Knee   Flexion: 4  Extension: 4    Left Ankle/Foot   Dorsiflexion: 4+  Plantar flexion: 4+    Right Ankle/Foot   Dorsiflexion: 4+  Plantar flexion: 4+    Additional Strength Details  Increased pain with decrease in MMT tolerance noted this assessment date    Functional Assessment      Squat  Not sitting toward left side       General Comments:      Hip Comments   PROM of hips to 100 degrees bilaterally  Hip extension to neutral only, painful with any extension past neutral  HS length WNL bilaterally with KP angle 0 degrees bilaterally at 90/90             Precautions: /Falls, lumbar compression fx, low vision, [place]  Re-eval Date: 8/26/2020    Date 8 11 20 8 14 20 8/20 8 25 20 8/27   Visit Count 21 22 23 24 25   FOTO     SEe RE   Pain In 3-4/10 R LB 0/10 8/10 7/10 See RE   Pain Out 3/10 0/10 6/10 5/10 See RE         Manuals: STM, "X" hand stretch   HS stretch, ITB, piriformis, groin, heel cord, prone quad stretch, hip flexor stretch supported at EOB stretch B/L to tolerance        Manuals 8 11 20 8 14 20 Not able to tolerate TE this date 2/2 pain 8 25 20     STM  20 min STM  10 min Total time 35 mins of MT as listed above STM  25 min STM  25 min total time as above   Ham stretch B 4x/20-30"   B 5x/20-30"  B 5x/20-30"    Groin B 5x/20-30"   B 5x/20-30"  B 5x/20-30"    Hip Flexors B 4x30"  Light, gentle B 5x/30"  B 5x/20-30"    Quad Stretch B 4x/20"-30" sidelying B 4x/20-30"  B 5x/20-30"    Heel cord stertch B 4x/30" B 5x/30"  B 5x/20-30"    Manual Stretch Time 25 min Total manuals incl STM and stretching =  20 min  Total manuals incl STM and stretching =  25 min                                            Ther Ex 8 11 20 8 14 20  8 25 20    LE strenghtening Held today due to time constraint with transportation arriving earlier for  Hip ABd  Seated  2x10   Blue velcro strap    Seated march with AH  Seated on round yellow foam 30x  Hip ABd  Seated  2x10   Blue velcro strap    Seated march with AH  Seated on round yellow foam 30x    Abdominal stability program          NuStep L1 x 8 min  Rest periods required t/o L1 x 8 min  L1 x 4 5 min Declined 2/2 JOHNSON with minimal exertion   SKTC B 5x/10"   B 5x/10"   B 5x/10"   DKTC B 5x/10"   B 5x/10"   B 5x/10"   LTR side -side   10x/10" side -side   10x/10"  side -side   10x/10" B 5x/10"   Alt UE with  Resume NV Seated on round yellow foam  1x20 light AH  Hold today B 5x/10" with VCs for AH   Alt LEs with AH Resume NV seated  1x20 light AH  Seated on round yellow foam  Hold today B 5x/10" with AAROM for LEs   Anti-trunk rotation  2 x 15 w/ AH  AROM  Hold today    MTP/LTP resume 1 x 20 w/ AH  Green t-band  Hold today    SLR x 3 Standing  B x 15 with B/L UE support Standing  B x 20 with B/L UE support  Standing  B x 20 with B/L UE support    HR/TR B 45x B 45x  B 45x MRE  B 5x/10"     HEP        Ther Activity        Lifting body mechanics                Gait Training                        Modalities 8 11 20 8 14 25  8 25 20    IFC with MH __________   __________ MH x 10 mins in prone with LEs elevated MHP x 8 min while on Nustep  (4 5 min active, 3 5 just seated) MH x 10 mins in sitting   Ultra Sound __________ __________      heat pack x 8 min total   Pt was instructed to verbalize any pain/discomfort to PT during/following tx  Skin was intact pre/post tx without adverse reaction noted

## 2020-08-30 ENCOUNTER — HOSPITAL ENCOUNTER (EMERGENCY)
Facility: HOSPITAL | Age: 85
Discharge: HOME | End: 2020-08-30
Attending: FAMILY MEDICINE
Payer: MEDICARE

## 2020-08-30 ENCOUNTER — APPOINTMENT (EMERGENCY)
Dept: RADIOLOGY | Facility: HOSPITAL | Age: 85
End: 2020-08-30
Payer: MEDICARE

## 2020-08-30 VITALS
DIASTOLIC BLOOD PRESSURE: 86 MMHG | SYSTOLIC BLOOD PRESSURE: 154 MMHG | WEIGHT: 130 LBS | OXYGEN SATURATION: 97 % | RESPIRATION RATE: 21 BRPM | HEART RATE: 93 BPM | TEMPERATURE: 97.6 F | BODY MASS INDEX: 25.52 KG/M2 | HEIGHT: 60 IN

## 2020-08-30 DIAGNOSIS — J44.1 COPD WITH ACUTE EXACERBATION (HCC): Primary | ICD-10-CM

## 2020-08-30 LAB
ANION GAP SERPL CALCULATED.3IONS-SCNC: 8 MMOL/L (ref 4–13)
BASOPHILS # BLD AUTO: 0 THOUSANDS/ΜL (ref 0–0.1)
BASOPHILS NFR BLD AUTO: 0 % (ref 0–2)
BILIRUB UR QL STRIP: NEGATIVE
BUN SERPL-MCNC: 16 MG/DL (ref 7–25)
CALCIUM SERPL-MCNC: 9.3 MG/DL (ref 8.6–10.5)
CHLORIDE SERPL-SCNC: 99 MMOL/L (ref 98–107)
CLARITY UR: CLEAR
CO2 SERPL-SCNC: 31 MMOL/L (ref 21–31)
COLOR UR: NORMAL
CREAT SERPL-MCNC: 0.63 MG/DL (ref 0.6–1.2)
EOSINOPHIL # BLD AUTO: 0.1 THOUSAND/ΜL (ref 0–0.61)
EOSINOPHIL NFR BLD AUTO: 1 % (ref 0–5)
ERYTHROCYTE [DISTWIDTH] IN BLOOD BY AUTOMATED COUNT: 17.2 % (ref 11.5–14.5)
GFR SERPL CREATININE-BSD FRML MDRD: 84 ML/MIN/1.73SQ M
GLUCOSE SERPL-MCNC: 102 MG/DL (ref 65–99)
GLUCOSE UR STRIP-MCNC: NEGATIVE MG/DL
HCT VFR BLD AUTO: 39.7 % (ref 42–47)
HGB BLD-MCNC: 13 G/DL (ref 12–16)
HGB UR QL STRIP.AUTO: NEGATIVE
KETONES UR STRIP-MCNC: NEGATIVE MG/DL
LEUKOCYTE ESTERASE UR QL STRIP: NEGATIVE
LYMPHOCYTES # BLD AUTO: 1.2 THOUSANDS/ΜL (ref 0.6–4.47)
LYMPHOCYTES NFR BLD AUTO: 17 % (ref 21–51)
MCH RBC QN AUTO: 27.9 PG (ref 26–34)
MCHC RBC AUTO-ENTMCNC: 32.7 G/DL (ref 31–37)
MCV RBC AUTO: 85 FL (ref 81–99)
MONOCYTES # BLD AUTO: 0.6 THOUSAND/ΜL (ref 0.17–1.22)
MONOCYTES NFR BLD AUTO: 9 % (ref 2–12)
NEUTROPHILS # BLD AUTO: 5.2 THOUSANDS/ΜL (ref 1.4–6.5)
NEUTS SEG NFR BLD AUTO: 73 % (ref 42–75)
NITRITE UR QL STRIP: NEGATIVE
PH UR STRIP.AUTO: 7 [PH]
PLATELET # BLD AUTO: 213 THOUSANDS/UL (ref 149–390)
PMV BLD AUTO: 6.9 FL (ref 8.6–11.7)
POTASSIUM SERPL-SCNC: 3.9 MMOL/L (ref 3.5–5.5)
PROT UR STRIP-MCNC: NEGATIVE MG/DL
RBC # BLD AUTO: 4.65 MILLION/UL (ref 3.9–5.2)
SODIUM SERPL-SCNC: 138 MMOL/L (ref 134–143)
SP GR UR STRIP.AUTO: 1.01 (ref 1–1.03)
TROPONIN I SERPL-MCNC: <0.03 NG/ML
UROBILINOGEN UR QL STRIP.AUTO: 0.2 E.U./DL
WBC # BLD AUTO: 7.2 THOUSAND/UL (ref 4.8–10.8)

## 2020-08-30 PROCEDURE — 84484 ASSAY OF TROPONIN QUANT: CPT | Performed by: PHYSICIAN ASSISTANT

## 2020-08-30 PROCEDURE — 80048 BASIC METABOLIC PNL TOTAL CA: CPT | Performed by: PHYSICIAN ASSISTANT

## 2020-08-30 PROCEDURE — 94644 CONT INHLJ TX 1ST HOUR: CPT

## 2020-08-30 PROCEDURE — 94760 N-INVAS EAR/PLS OXIMETRY 1: CPT

## 2020-08-30 PROCEDURE — 99285 EMERGENCY DEPT VISIT HI MDM: CPT

## 2020-08-30 PROCEDURE — 85025 COMPLETE CBC W/AUTO DIFF WBC: CPT | Performed by: PHYSICIAN ASSISTANT

## 2020-08-30 PROCEDURE — 96365 THER/PROPH/DIAG IV INF INIT: CPT

## 2020-08-30 PROCEDURE — 36415 COLL VENOUS BLD VENIPUNCTURE: CPT | Performed by: PHYSICIAN ASSISTANT

## 2020-08-30 PROCEDURE — 81003 URINALYSIS AUTO W/O SCOPE: CPT

## 2020-08-30 PROCEDURE — 99284 EMERGENCY DEPT VISIT MOD MDM: CPT | Performed by: PHYSICIAN ASSISTANT

## 2020-08-30 PROCEDURE — 96375 TX/PRO/DX INJ NEW DRUG ADDON: CPT

## 2020-08-30 RX ORDER — BUDESONIDE AND FORMOTEROL FUMARATE DIHYDRATE 160; 4.5 UG/1; UG/1
2 AEROSOL RESPIRATORY (INHALATION) 2 TIMES DAILY
Status: ON HOLD | COMMUNITY
End: 2020-12-12 | Stop reason: CLARIF

## 2020-08-30 RX ORDER — DEXAMETHASONE SODIUM PHOSPHATE 4 MG/ML
8 INJECTION, SOLUTION INTRA-ARTICULAR; INTRALESIONAL; INTRAMUSCULAR; INTRAVENOUS; SOFT TISSUE ONCE
Status: COMPLETED | OUTPATIENT
Start: 2020-08-30 | End: 2020-08-30

## 2020-08-30 RX ORDER — MAGNESIUM SULFATE HEPTAHYDRATE 40 MG/ML
2 INJECTION, SOLUTION INTRAVENOUS ONCE
Status: COMPLETED | OUTPATIENT
Start: 2020-08-30 | End: 2020-08-30

## 2020-08-30 RX ORDER — PREDNISONE 50 MG/1
50 TABLET ORAL DAILY
Qty: 4 TABLET | Refills: 0 | Status: SHIPPED | OUTPATIENT
Start: 2020-08-31 | End: 2020-09-04

## 2020-08-30 RX ADMIN — ALBUTEROL SULFATE 10 MG: 2.5 SOLUTION RESPIRATORY (INHALATION) at 14:47

## 2020-08-30 RX ADMIN — DEXAMETHASONE SODIUM PHOSPHATE 8 MG: 4 INJECTION, SOLUTION INTRA-ARTICULAR; INTRALESIONAL; INTRAMUSCULAR; INTRAVENOUS; SOFT TISSUE at 16:29

## 2020-08-30 RX ADMIN — IPRATROPIUM BROMIDE 0.5 MG: 0.5 SOLUTION RESPIRATORY (INHALATION) at 14:47

## 2020-08-30 RX ADMIN — MAGNESIUM SULFATE IN WATER 2 G: 40 INJECTION, SOLUTION INTRAVENOUS at 16:38

## 2020-08-30 NOTE — ED PROVIDER NOTES
History  Chief Complaint   Patient presents with    Shortness of Breath     started 6 days ago, seen by PCP "got a shot and it cured it, I'm allergic to something, I think it's the new pillows I got" pt wears 2L chronic nasal cannula     57-year-old female history of asthma, COPD and seasonal allergies well known to this ER presents complaining of shortness of breath  She reports that she recently was seen at her PCP and pulmonologist were she was switched from Trelegy to Symbicort that she has been taking for 3 days  She also reports a recent shot of Decadron that improved her symptoms however if since worn off  She wears 2 L nasal cannula oxygen at home and reports that she has had wheezing and a construction like feeling in her airway  She is mildly tachypneic however able to have a conversation and does not appear toxic at this time  Denies any fevers, chills, cough worse than usual, chest pain or any other complaints at this time  Prior to Admission Medications   Prescriptions Last Dose Informant Patient Reported? Taking? Artificial Tear Solution (GENTEAL TEARS) 0 1-0 2-0 3 % SOLN  Self Yes No   Sig: Administer 1 drop to both eyes 3 (three) times a day   Calcium Carb-Cholecalciferol (CALCIUM 1000 + D PO)  Self Yes No   Sig: Take 1,000 mg by mouth daily   acetaminophen (TYLENOL) 325 mg tablet  Self No No   Sig: Take 2 tablets (650 mg total) by mouth every 6 (six) hours as needed for mild pain, headaches or fever   albuterol (Ventolin HFA) 90 mcg/act inhaler  Self No No   Sig: Inhale 2 puffs every 6 (six) hours as needed for wheezing   alendronate (FOSAMAX) 70 mg tablet  Self No No   Sig: Take 1 tablet (70 mg total) by mouth every 7 days   apixaban (ELIQUIS) 5 mg   No No   Sig: Take 1 tablet (5 mg total) by mouth 2 (two) times a day   budesonide-formoterol (SYMBICORT) 160-4 5 mcg/act inhaler 8/30/2020 at Unknown time  Yes Yes   Sig: Inhale 2 puffs 2 (two) times a day Rinse mouth after use  fluticasone-umeclidinium-vilanterol (Trelegy Ellipta) 100-62 5-25 MCG/INH inhaler Not Taking at Unknown time  No No   Sig: Inhale 1 puff daily Rinse mouth after use     Patient not taking: Reported on 8/30/2020   furosemide (LASIX) 20 mg tablet  Self No No   Sig: Take 1 tablet (20 mg total) by mouth daily   gabapentin (NEURONTIN) 300 mg capsule   No No   Sig: Take 1 capsule (300 mg total) by mouth 3 (three) times a day   ipratropium (ATROVENT HFA) 17 mcg/act inhaler   No No   Sig: Inhale 2 puffs every 6 (six) hours   ipratropium-albuterol (DUO-NEB) 0 5-2 5 mg/3 mL nebulizer solution  Self No No   Sig: Take 1 vial (3 mL total) by nebulization every 6 (six) hours while awake   iron polysaccharides (FERREX) 150 mg capsule   No No   Sig: Take 1 capsule (150 mg total) by mouth 2 (two) times a day   metoprolol tartrate (LOPRESSOR) 25 mg tablet  Self No No   Sig: Take 0 5 tablets (12 5 mg total) by mouth every 12 (twelve) hours   montelukast (SINGULAIR) 10 mg tablet  Self No No   Sig: Take 1 tablet (10 mg total) by mouth daily at bedtime   oxyCODONE-acetaminophen (PERCOCET) 5-325 mg per tablet   No No   Sig: Take 1 tablet by mouth every 6 (six) hours as needed for moderate pain Ok to fill today- 07/24/2020Max Daily Amount: 4 tablets   pantoprazole (PROTONIX) 40 mg tablet  Self No No   Sig: Take 1 tablet (40 mg total) by mouth daily before breakfast   predniSONE 5 mg tablet   No No   Sig: Take 1 tablet (5 mg total) by mouth daily   psyllium (METAMUCIL) packet  Self No No   Sig: Take 1 packet by mouth 3 (three) times a day   simethicone (MYLICON) 80 mg chewable tablet  Self No No   Sig: Chew 1 tablet (80 mg total) every 6 (six) hours as needed for flatulence   theophylline (CORTNEY-24) 200 MG 24 hr capsule   No No   Sig: Take 1 capsule (200 mg total) by mouth daily      Facility-Administered Medications: None       Past Medical History:   Diagnosis Date    Asthma     Atrial fibrillation     Blurred vision     Cardiac disease     Colitis     COPD (chronic obstructive pulmonary disease)     Diverticulosis     DJD (degenerative joint disease)     Emphysema lung     Gait abnormality     Hypertension     Shortness of breath        Past Surgical History:   Procedure Laterality Date    BLADDER SURGERY      COLON SURGERY      COLONOSCOPY      COLONOSCOPY W/ POLYPECTOMY N/A 2019    Procedure: COLONOSCOPY W/ POLYPECTOMY;  Surgeon: Martín Todd MD;  Location: [address] [place] [address] GI LAB; Service: General    GASTROJEJUNOSTOMY W/ JEJUNOSTOMY TUBE N/A 2/3/2020    Procedure: Antrectomy with bilroth II Anastomosis; Surgeon: Julio Brown MD;  [facility]: [facility];  Service: General    SMALL INTESTINE SURGERY  2020    with partial gastrectomy       Family History   Problem Relation Age of Onset    Heart disease Mother      I have reviewed and agree with the history as documented  E-Cigarette/Vaping    E-Cigarette Use Never User      E-Cigarette/Vaping Substances    Nicotine No     THC No     CBD No     Flavoring No     Other No     Unknown No      Social History     Tobacco Use    Smoking status: Former Smoker     Last attempt to quit: 2013     Years since quittin 7    Smokeless tobacco: Never Used   Substance Use Topics    Alcohol use: Not Currently     Frequency: Never     Binge frequency: Never    Drug use: Not Currently       Review of Systems   Constitutional: Negative for chills, fatigue and fever  HENT: Negative for ear pain and sore throat  Eyes: Negative for pain  Respiratory: Positive for shortness of breath  Negative for cough and wheezing  Cardiovascular: Negative for chest pain, palpitations and leg swelling  Gastrointestinal: Negative for abdominal pain, constipation, diarrhea, nausea and vomiting  Endocrine: Negative for polyuria  Genitourinary: Negative for dysuria and pelvic pain  Musculoskeletal: Negative for arthralgias, myalgias, neck pain and neck stiffness     Skin: Negative for rash  Neurological: Negative for dizziness, syncope, light-headedness and headaches  All other systems reviewed and are negative  Physical Exam  Physical Exam  Constitutional:       Appearance: She is well-developed  HENT:      Head: Normocephalic and atraumatic  Mouth/Throat:      Pharynx: No oropharyngeal exudate  Neck:      Musculoskeletal: Normal range of motion  Cardiovascular:      Rate and Rhythm: Normal rate and regular rhythm  Heart sounds: Normal heart sounds  Pulmonary:      Effort: Pulmonary effort is normal       Breath sounds: Examination of the right-upper field reveals wheezing  Examination of the left-upper field reveals wheezing  Wheezing present  Abdominal:      General: Bowel sounds are normal       Palpations: Abdomen is soft  Tenderness: There is no abdominal tenderness  Musculoskeletal: Normal range of motion  Skin:     General: Skin is warm  Capillary Refill: Capillary refill takes less than 2 seconds  Neurological:      Mental Status: She is alert and oriented to person, place, and time           Vital Signs  ED Triage Vitals [08/30/20 1419]   Temperature Pulse Respirations Blood Pressure SpO2   97 6 °F (36 4 °C) 87 21 143/71 96 %      Temp Source Heart Rate Source Patient Position - Orthostatic VS BP Location FiO2 (%)   Temporal -- -- -- --      Pain Score       No Pain           Vitals:    08/30/20 1615 08/30/20 1630 08/30/20 1645 08/30/20 1700   BP:   154/86    Pulse: 90 99 103 93         Visual Acuity      ED Medications  Medications   albuterol inhalation solution 10 mg (10 mg Nebulization Given 8/30/20 1447)   ipratropium (ATROVENT) 0 02 % inhalation solution 0 5 mg (0 5 mg Nebulization Given 8/30/20 1447)   magnesium sulfate 2 g/50 mL IVPB (premix) 2 g (0 g Intravenous Stopped 8/30/20 1710)   dexamethasone (DECADRON) injection 8 mg (8 mg Intravenous Given 8/30/20 1629)       Diagnostic Studies  Results Reviewed     Procedure Component Value Units Date/Time    UA (URINE) with reflex to Scope [[id number]]  (Normal) Collected:  08/30/20 1641    Lab Status:  Final result Specimen:  Urine, Clean Catch Updated:  08/30/20 1653     Color, UA Straw     Clarity, UA Clear     Specific Gravity, UA 1 010     pH, UA 7 0     Leukocytes, UA Negative     Nitrite, UA Negative     Protein, UA Negative mg/dl      Glucose, UA Negative mg/dl      Ketones, UA Negative mg/dl      Urobilinogen, UA 0 2 E U /dl      Bilirubin, UA Negative     Blood, UA Negative    Basic metabolic panel [[id number]]  (Abnormal) Collected:  08/30/20 1446    Lab Status:  Final result Specimen:  Blood from Arm, Left Updated:  08/30/20 1508     Sodium 138 mmol/L      Potassium 3 9 mmol/L      Chloride 99 mmol/L      CO2 31 mmol/L      ANION GAP 8 mmol/L      BUN 16 mg/dL      Creatinine 0 63 mg/dL      Glucose 102 mg/dL      Calcium 9 3 mg/dL      eGFR 84 ml/min/1 73sq m     Narrative:       [place] guidelines for Chronic Kidney Disease (CKD):     Stage 1 with normal or high GFR (GFR > 90 mL/min/1 73 square meters)    Stage 2 Mild CKD (GFR = 60-89 mL/min/1 73 square meters)    Stage 3A Moderate CKD (GFR = 45-59 mL/min/1 73 square meters)    Stage 3B Moderate CKD (GFR = 30-44 mL/min/1 73 square meters)    Stage 4 Severe CKD (GFR = 15-29 mL/min/1 73 square meters)    Stage 5 End Stage CKD (GFR <15 mL/min/1 73 square meters)  Note: GFR calculation is accurate only with a steady state creatinine    Troponin I [[id number]]  (Normal) Collected:  08/30/20 1446    Lab Status:  Final result Specimen:  Blood from Arm, Left Updated:  08/30/20 1508     Troponin I <0 03 ng/mL     CBC and differential [[id number]]  (Abnormal) Collected:  08/30/20 1446    Lab Status:  Final result Specimen:  Blood from Arm, Left Updated:  08/30/20 1452     WBC 7 20 Thousand/uL      RBC 4 65 Million/uL      Hemoglobin 13 0 g/dL      Hematocrit 39 7 %      MCV 85 fL      MCH 27 9 pg MCHC 32 7 g/dL      RDW 17 2 %      MPV 6 9 fL      Platelets 270 Thousands/uL      Neutrophils Relative 73 %      Lymphocytes Relative 17 %      Monocytes Relative 9 %      Eosinophils Relative 1 %      Basophils Relative 0 %      Neutrophils Absolute 5 20 Thousands/µL      Lymphocytes Absolute 1 20 Thousands/µL      Monocytes Absolute 0 60 Thousand/µL      Eosinophils Absolute 0 10 Thousand/µL      Basophils Absolute 0 00 Thousands/µL                  No orders to display              Procedures  Procedures         ED Course       US AUDIT      Most Recent Value   Initial Alcohol Screen: US AUDIT-C    1  How often do you have a drink containing alcohol?  0 Filed at: 08/30/2020 1420   2  How many drinks containing alcohol do you have on a typical day you are drinking? 0 Filed at: 08/30/2020 1420   3a  Male UNDER 65: How often do you have five or more drinks on one occasion? 0 Filed at: 08/30/2020 1420   3b  FEMALE Any Age, or MALE 65+: How often do you have 4 or more drinks on one occassion? 0 Filed at: 08/30/2020 1420   Audit-C Score  0 Filed at: 08/30/2020 1420                  ILIA/DAST-10      Most Recent Value   How many times in the past year have you    Used an illegal drug or used a prescription medication for non-medical reasons? Never Filed at: 08/30/2020 1420                                MDM  Number of Diagnoses or Management Options  COPD with acute exacerbation [facility]):   Diagnosis management comments: Patient presented with acute on chronic shortness of breath  Patient refused chest x-ray stated she wanted to go home after her breathing treatment and Decadron  She was at her baseline and states that she has family back and watch her at home  This seemed reasonable as it appears to be the patient's baseline  Recommend follow-up with family doctor, prednisone for 4 days and return to ER if condition worsens  Patient agreeable to plan          Disposition  Final diagnoses:   COPD with acute exacerbation ([address] )     Time reflects when diagnosis was documented in both MDM as applicable and the Disposition within this note     Time User Action Codes Description Comment    8/30/2020  5:06 PM Megan Ponce Add [J44 1] COPD with acute exacerbation [facility])       ED Disposition     ED Disposition Condition Date/Time Comment    Discharge Stable Sun Aug 30, 2020  5:06 PM Carmela Horn discharge to home/self care  Follow-up Information     Follow up With Specialties Details Why [address], DO Internal Medicine Schedule an appointment as soon as possible for a visit   [place]  [address] [place]  [phone]            Discharge Medication List as of 8/30/2020  5:12 PM      START taking these medications    Details   ! ! predniSONE 50 mg tablet Take 1 tablet (50 mg total) by mouth daily for 4 days, Starting Mon 8/31/2020, Until Fri 9/4/2020, Normal       !! - Potential duplicate medications found  Please discuss with provider        CONTINUE these medications which have NOT CHANGED    Details   budesonide-formoterol (SYMBICORT) 160-4 5 mcg/act inhaler Inhale 2 puffs 2 (two) times a day Rinse mouth after use , Historical Med      acetaminophen (TYLENOL) 325 mg tablet Take 2 tablets (650 mg total) by mouth every 6 (six) hours as needed for mild pain, headaches or fever, Starting Fri 2/14/2020, Normal      albuterol (Ventolin HFA) 90 mcg/act inhaler Inhale 2 puffs every 6 (six) hours as needed for wheezing, Starting Mon 6/29/2020, Until Sat 12/26/2020, Normal      alendronate (FOSAMAX) 70 mg tablet Take 1 tablet (70 mg total) by mouth every 7 days, Starting Tue 7/14/2020, Normal      apixaban (ELIQUIS) 5 mg Take 1 tablet (5 mg total) by mouth 2 (two) times a day, Starting Tue 8/11/2020, Normal      Artificial Tear Solution (GENTEAL TEARS) 0 1-0 2-0 3 % SOLN Administer 1 drop to both eyes 3 (three) times a day, Starting Fri 2/14/2020, Historical Med      Calcium Carb-Cholecalciferol (CALCIUM 1000 + D PO) Take 1,000 mg by mouth daily, Starting Wed 3/28/2018, Historical Med      fluticasone-umeclidinium-vilanterol (Trelegy Ellipta) 100-62 5-25 MCG/INH inhaler Inhale 1 puff daily Rinse mouth after use , Starting Tue 8/11/2020, Normal      furosemide (LASIX) 20 mg tablet Take 1 tablet (20 mg total) by mouth daily, Starting Mon 7/20/2020, Until Sun 8/30/2020, Normal      gabapentin (NEURONTIN) 300 mg capsule Take 1 capsule (300 mg total) by mouth 3 (three) times a day, Starting Thu 8/20/2020, Normal      ipratropium (ATROVENT HFA) 17 mcg/act inhaler Inhale 2 puffs every 6 (six) hours, Starting Tue 8/11/2020, Until Thu 9/10/2020, Normal      ipratropium-albuterol (DUO-NEB) 0 5-2 5 mg/3 mL nebulizer solution Take 1 vial (3 mL total) by nebulization every 6 (six) hours while awake, Starting Tue 11/12/2019, Print      iron polysaccharides (FERREX) 150 mg capsule Take 1 capsule (150 mg total) by mouth 2 (two) times a day, Starting Tue 8/11/2020, Normal      metoprolol tartrate (LOPRESSOR) 25 mg tablet Take 0 5 tablets (12 5 mg total) by mouth every 12 (twelve) hours, Starting Tue 5/12/2020, Normal      montelukast (SINGULAIR) 10 mg tablet Take 1 tablet (10 mg total) by mouth daily at bedtime, Starting Mon 4/27/2020, Normal      oxyCODONE-acetaminophen (PERCOCET) 5-325 mg per tablet Take 1 tablet by mouth every 6 (six) hours as needed for moderate pain Ok to fill today- 07/24/2020Max Daily Amount: 4 tablets, Starting Mon 8/24/2020, Normal      pantoprazole (PROTONIX) 40 mg tablet Take 1 tablet (40 mg total) by mouth daily before breakfast, Starting Mon 6/29/2020, Normal      !! predniSONE 5 mg tablet Take 1 tablet (5 mg total) by mouth daily, Starting Sun 8/16/2020, Until Thu 10/15/2020, Normal      psyllium (METAMUCIL) packet Take 1 packet by mouth 3 (three) times a day, Starting Tue 11/12/2019, Print      simethicone (MYLICON) 80 mg chewable tablet Chew 1 tablet (80 mg total) every 6 (six) hours as needed for flatulence, Starting Fri 2/14/2020, Normal      theophylline (CORTNEY-24) 200 MG 24 hr capsule Take 1 capsule (200 mg total) by mouth daily, Starting Wed 8/26/2020, Until Fri 9/25/2020, Normal       !! - Potential duplicate medications found  Please discuss with provider  No discharge procedures on file      PDMP Review       Value Time User    PDMP Reviewed  Yes 8/24/2020 10:16 AM Yue Rojas, [facility]           ED Provider  Electronically Signed by           Eric Hart PA-C  08/30/20 1946

## 2020-08-30 NOTE — DISCHARGE INSTRUCTIONS
Take prednisone as directed  Use DuoNebs at home as needed  Take all medications as prescribed and follow-up with family doctor  Return to ER if condition significantly worsens

## 2020-09-02 ENCOUNTER — APPOINTMENT (OUTPATIENT)
Dept: RADIOLOGY | Facility: HOSPITAL | Age: 85
End: 2020-09-02
Payer: MEDICARE

## 2020-09-02 ENCOUNTER — HOSPITAL ENCOUNTER (OUTPATIENT)
Facility: HOSPITAL | Age: 85
Setting detail: OUTPATIENT SURGERY
Discharge: HOME | End: 2020-09-02
Attending: ANESTHESIOLOGY | Admitting: ANESTHESIOLOGY
Payer: MEDICARE

## 2020-09-02 VITALS
HEART RATE: 70 BPM | OXYGEN SATURATION: 93 % | DIASTOLIC BLOOD PRESSURE: 68 MMHG | TEMPERATURE: 98 F | RESPIRATION RATE: 18 BRPM | SYSTOLIC BLOOD PRESSURE: 101 MMHG

## 2020-09-02 PROCEDURE — 27096 INJECT SACROILIAC JOINT: CPT | Performed by: ANESTHESIOLOGY

## 2020-09-02 RX ORDER — METHYLPREDNISOLONE ACETATE 40 MG/ML
INJECTION, SUSPENSION INTRA-ARTICULAR; INTRALESIONAL; INTRAMUSCULAR; SOFT TISSUE AS NEEDED
Status: DISCONTINUED | OUTPATIENT
Start: 2020-09-02 | End: 2020-09-02 | Stop reason: HOSPADM

## 2020-09-02 RX ORDER — LIDOCAINE HYDROCHLORIDE 10 MG/ML
INJECTION, SOLUTION EPIDURAL; INFILTRATION; INTRACAUDAL; PERINEURAL AS NEEDED
Status: DISCONTINUED | OUTPATIENT
Start: 2020-09-02 | End: 2020-09-02 | Stop reason: HOSPADM

## 2020-09-02 RX ORDER — BUPIVACAINE HYDROCHLORIDE 2.5 MG/ML
INJECTION, SOLUTION EPIDURAL; INFILTRATION; INTRACAUDAL AS NEEDED
Status: DISCONTINUED | OUTPATIENT
Start: 2020-09-02 | End: 2020-09-02 | Stop reason: HOSPADM

## 2020-09-02 NOTE — DISCHARGE INSTRUCTIONS
Steroid Joint Injection   WHAT YOU NEED TO KNOW:   A steroid joint injection is a procedure to inject steroid medicine into a joint  Steroid medicine decreases pain and inflammation  The injection may also contain an anesthetic (numbing medicine) to decrease pain  It may be done to treat conditions such as arthritis, gout, or carpal tunnel syndrome  The injections may be given in your knee, ankle, shoulder, elbow, wrist, ankle or sacroiliac joint  1  Do not apply heat to any area that is numb  If you have discomfort or soreness at the injection site, you may apply ice today, 20 minutes on and 20 minutes off  Tomorrow you may use ice or warm, moist heat  Do not apply ice or heat directly to the skin  2  You may have an increase or change in the discomfort for 36-48 hours after your treatment  Apply ice and continue with any pain medicine you have been prescribed  3  Do not do anything strenuous today  You may shower, but no tub baths or hot tubs today  You may resume your normal activities tomorrow, but do not overdo it  Resume normal activities slowly when you are feeling better  4  If you experience redness, drainage or swelling at the injection site, or if you develop a fever above 100 degrees, please call [facility] at [phone] or go to the Emergency Room  5  Continue to take all routine medicines prescribed by your primary care physician unless otherwise instructed by our staff  Most blood thinners should be started again according to your regularly scheduled dosing  If you have any questions, please give our office a call  If you have a problem specifically related to your procedure, please call our office at [phone]  Problems not related to your procedure should be directed to your primary care physician

## 2020-09-02 NOTE — H&P
Assessment:  Sacroiliitis ([facility] ) [M46 1]    Plan:  Jessica Colin is a 82 y o  female with complaints of hip pain presents to surgical center for procedure  1  We will perform a BLOCK / INJECTION SACROILIAC  2  2  Follow-up 1 month after injection    Complete risks and benefits including bleeding, infection, tissue reaction, nerve injury and allergic reaction were discussed  The approach was demonstrated using models and literature was provided  Verbal and written consent was obtained  My impressions and treatment recommendations were discussed in detail with the patient who verbalized understanding and had no further questions  Discharge instructions were provided  I personally saw and examined the patient and I agree with the above discussed plan of care  No orders of the defined types were placed in this encounter  No orders of the defined types were placed in this encounter  History of Present Illness:  Jessica Colin is a 82 y o  female who presents for a follow up office visit in regards to hip pain  The patients current symptoms include 8/10 constant sharp, stabbing pain worse with sitting without any particular time pattern      I have personally reviewed and/or updated the patient's past medical history, past surgical history, family history, social history, current medications, allergies, and vital signs today  Review of Systems   Genitourinary: Positive for decreased urine volume  Musculoskeletal: Positive for arthralgias and back pain  All other systems reviewed and are negative        Patient Active Problem List   Diagnosis    COPD (chronic obstructive pulmonary disease) ([facility] )    Cardiac disease    Diverticulosis of intestine with bleeding    Diarrhea    Colorectal polyps    Effusion of bursa of left elbow    Cardiomegaly    Chronic anxiety    Chronic cystitis    Chronic right-sided low back pain without sciatica    Congestive heart failure ([facility] )    Deviated nasal septum    Diverticulosis of colon    Gastroesophageal reflux disease without esophagitis    Gout    History of angioedema    History of colonic polyps    Lumbar radiculopathy    Macular degeneration of both eyes    Obesity    Osteoporosis    Other specified forms of chronic ischemic heart disease    Seasonal allergies    Panic attack    Vocal cord dysfunction    Colitis presumed infectious    Atrial fibrillation with rapid ventricular response (HCC)    Left elbow pain    Chronic respiratory failure with hypoxia (HCC)    Debility    Positive culture findings in sputum    Compression fracture of L4 vertebra (HCC)    Closed wedge compression fracture of T12 vertebra (HCC)    Wound dehiscence, surgical, subsequent encounter    Chronic peptic ulcer of stomach    Chronic pain syndrome    Sacroiliitis (HCC)       Past Medical History:   Diagnosis Date    Asthma     Atrial fibrillation     Blurred vision     Cardiac disease     Colitis     COPD (chronic obstructive pulmonary disease)     Diverticulosis     DJD (degenerative joint disease)     Emphysema lung     Gait abnormality     Hypertension     Shortness of breath        Past Surgical History:   Procedure Laterality Date    BLADDER SURGERY      COLON SURGERY      COLONOSCOPY      COLONOSCOPY W/ POLYPECTOMY N/A 1/21/2019    Procedure: COLONOSCOPY W/ POLYPECTOMY;  Surgeon: Dean Dwyer MD;  Location: [address] [place] [address] GI LAB; Service: General    GASTROJEJUNOSTOMY W/ JEJUNOSTOMY TUBE N/A 2/3/2020    Procedure: Antrectomy with bilroth II Anastomosis;   Surgeon: Zeina Feliz MD;  Location:  MAIN OR;  Service: General    SMALL INTESTINE SURGERY  03/2020    with partial gastrectomy       Family History   Problem Relation Age of Onset    Heart disease Mother        Social History     Occupational History    Occupation: retired   Tobacco Use    Smoking status: Former Smoker     Last attempt to quit: 11/27/2013     Years since quittin 7    Smokeless tobacco: Never Used   Substance and Sexual Activity    Alcohol use: Not Currently     Frequency: Never     Binge frequency: Never    Drug use: Not Currently    Sexual activity: Not Currently       No current facility-administered medications on file prior to encounter  Current Outpatient Medications on File Prior to Encounter   Medication Sig    acetaminophen (TYLENOL) 325 mg tablet Take 2 tablets (650 mg total) by mouth every 6 (six) hours as needed for mild pain, headaches or fever    albuterol (Ventolin HFA) 90 mcg/act inhaler Inhale 2 puffs every 6 (six) hours as needed for wheezing    alendronate (FOSAMAX) 70 mg tablet Take 1 tablet (70 mg total) by mouth every 7 days    apixaban (ELIQUIS) 5 mg Take 1 tablet (5 mg total) by mouth 2 (two) times a day    Artificial Tear Solution (GENTEAL TEARS) 0 1-0 2-0 3 % SOLN Administer 1 drop to both eyes 3 (three) times a day    Calcium Carb-Cholecalciferol (CALCIUM 1000 + D PO) Take 1,000 mg by mouth daily    fluticasone-umeclidinium-vilanterol (Trelegy Ellipta) 100-62 5-25 MCG/INH inhaler Inhale 1 puff daily Rinse mouth after use   (Patient not taking: Reported on 2020)    furosemide (LASIX) 20 mg tablet Take 1 tablet (20 mg total) by mouth daily    gabapentin (NEURONTIN) 300 mg capsule Take 1 capsule (300 mg total) by mouth 3 (three) times a day    ipratropium (ATROVENT HFA) 17 mcg/act inhaler Inhale 2 puffs every 6 (six) hours    ipratropium-albuterol (DUO-NEB) 0 5-2 5 mg/3 mL nebulizer solution Take 1 vial (3 mL total) by nebulization every 6 (six) hours while awake    iron polysaccharides (FERREX) 150 mg capsule Take 1 capsule (150 mg total) by mouth 2 (two) times a day    metoprolol tartrate (LOPRESSOR) 25 mg tablet Take 0 5 tablets (12 5 mg total) by mouth every 12 (twelve) hours    montelukast (SINGULAIR) 10 mg tablet Take 1 tablet (10 mg total) by mouth daily at bedtime    pantoprazole (PROTONIX) 40 mg tablet Take 1 tablet (40 mg total) by mouth daily before breakfast    predniSONE 5 mg tablet Take 1 tablet (5 mg total) by mouth daily    psyllium (METAMUCIL) packet Take 1 packet by mouth 3 (three) times a day    simethicone (MYLICON) 80 mg chewable tablet Chew 1 tablet (80 mg total) every 6 (six) hours as needed for flatulence       Allergies   Allergen Reactions    Bee Venom     Fluticasone      Per pt  error       Physical Exam:    /68   Pulse 70   Temp 98 °F (36 7 °C) (Tympanic)   Resp 18   LMP  (LMP Unknown)   SpO2 93%     Constitutional:normal, well developed, well nourished, alert, in no distress and non-toxic and no overt pain behavior    Eyes:anicteric  HEENT:grossly intact  Neck:supple, symmetric, trachea midline and no masses   Pulmonary:even and unlabored  Cardiovascular:No edema or pitting edema present  Skin:Normal without rashes or lesions and well hydrated  Psychiatric:Mood and affect appropriate  Neurologic:Cranial Nerves II-XII grossly intact  Musculoskeletal:normal

## 2020-09-04 ENCOUNTER — OFFICE VISIT (OUTPATIENT)
Dept: INTERNAL MEDICINE CLINIC | Facility: CLINIC | Age: 85
End: 2020-09-04
Payer: MEDICARE

## 2020-09-04 VITALS
TEMPERATURE: 99.7 F | RESPIRATION RATE: 16 BRPM | DIASTOLIC BLOOD PRESSURE: 68 MMHG | OXYGEN SATURATION: 92 % | HEART RATE: 80 BPM | BODY MASS INDEX: 25.4 KG/M2 | SYSTOLIC BLOOD PRESSURE: 118 MMHG | WEIGHT: 129.4 LBS | HEIGHT: 60 IN

## 2020-09-04 DIAGNOSIS — M54.16 LUMBAR RADICULOPATHY: Primary | ICD-10-CM

## 2020-09-04 DIAGNOSIS — J41.0 SIMPLE CHRONIC BRONCHITIS (HCC): Chronic | ICD-10-CM

## 2020-09-04 DIAGNOSIS — S32.040D COMPRESSION FRACTURE OF L4 VERTEBRA WITH ROUTINE HEALING, SUBSEQUENT ENCOUNTER: ICD-10-CM

## 2020-09-04 DIAGNOSIS — G89.4 CHRONIC PAIN SYNDROME: ICD-10-CM

## 2020-09-04 DIAGNOSIS — J96.11 CHRONIC RESPIRATORY FAILURE WITH HYPOXIA (HCC): ICD-10-CM

## 2020-09-04 DIAGNOSIS — K21.9 GASTROESOPHAGEAL REFLUX DISEASE WITHOUT ESOPHAGITIS: ICD-10-CM

## 2020-09-04 PROCEDURE — 99214 OFFICE O/P EST MOD 30 MIN: CPT | Performed by: INTERNAL MEDICINE

## 2020-09-04 RX ORDER — PANTOPRAZOLE SODIUM 40 MG/1
40 TABLET, DELAYED RELEASE ORAL 2 TIMES DAILY
Qty: 60 TABLET | Refills: 5 | Status: SHIPPED | OUTPATIENT
Start: 2020-09-04 | End: 2021-01-01

## 2020-09-04 RX ORDER — GABAPENTIN 300 MG/1
300 CAPSULE ORAL 2 TIMES DAILY
Qty: 60 CAPSULE | Refills: 1
Start: 2020-09-04 | End: 2020-10-26

## 2020-09-04 RX ORDER — PREDNISONE 1 MG/1
5 TABLET ORAL 2 TIMES DAILY WITH MEALS
Qty: 60 TABLET | Refills: 1 | Status: SHIPPED | OUTPATIENT
Start: 2020-09-04 | End: 2020-10-26

## 2020-09-04 NOTE — PROGRESS NOTES
Assessment/Plan:    Problem List Items Addressed This Visit        Digestive    Gastroesophageal reflux disease without esophagitis    Relevant Medications    pantoprazole (PROTONIX) 40 mg tablet       Respiratory    COPD (chronic obstructive pulmonary disease) (HCC) (Chronic)    Relevant Medications    predniSONE 5 mg tablet    Other Relevant Orders    Portable Concentrators    Chronic respiratory failure with hypoxia (HCC) (Chronic)    Relevant Medications    predniSONE 5 mg tablet       Nervous and Auditory    Lumbar radiculopathy - Primary       Musculoskeletal and Integument    Compression fracture of L4 vertebra (HCC)    Relevant Medications    gabapentin (NEURONTIN) 300 mg capsule       Other    Chronic pain syndrome    Relevant Medications    gabapentin (NEURONTIN) 300 mg capsule           Diagnoses and all orders for this visit:    Lumbar radiculopathy    Chronic respiratory failure with hypoxia (HCC)  -     predniSONE 5 mg tablet; Take 1 tablet (5 mg total) by mouth 2 (two) times a day with meals    Gastroesophageal reflux disease without esophagitis  -     pantoprazole (PROTONIX) 40 mg tablet; Take 1 tablet (40 mg total) by mouth 2 (two) times a day    Chronic pain syndrome  -     gabapentin (NEURONTIN) 300 mg capsule; Take 1 capsule (300 mg total) by mouth 2 (two) times a day    Compression fracture of L4 vertebra with routine healing, subsequent encounter  -     gabapentin (NEURONTIN) 300 mg capsule; Take 1 capsule (300 mg total) by mouth 2 (two) times a day    Simple chronic bronchitis (HCC)  -     Portable Concentrators    Other orders  -     Discontinue: apixaban (Eliquis) 5 mg; Take 5 mg by mouth 2 (two) times a day        No problem-specific Assessment & Plan notes found for this encounter  Subjective: Improved breathing  The patient is very interested in a portable concentrator  Severe COPD  Patient ID: Liborio Velazquez is a 82 y o  female      The patient was in the office last week and subsequently went to the ER on Sunday  She notes her breathing is doing better on the higher dose of Prednisone and Theophyline  The patient state that she is having moderate to severe JOHNSON and notes that the present concentrator is to heavy and she can not ambulate with this  She states that she would rather not use this and just be SOB  The patient wants 2 pills dicontinued today  She notes that this is non-negotiable  We will reduce the Gabapentin to BID (recent effective shot from Pain Management) and we will stop the Furosemide  Her BP is good and there are no other issues at this time  O2 Sats  Stationary with O2 93%  Stationary without O2 89%  Ambulating with O2 92%  Ambulatory without O2 84%  The patient notes difficulty with the weight of her current portable O2 and is unable to use this  She needs the lighter O2 condenser unit with the 1 liter bottle of O2  The following portions of the patient's history were reviewed and updated as appropriate:   She has a past medical history of Asthma, Atrial fibrillation, Blurred vision, Cardiac disease, Colitis, COPD (chronic obstructive pulmonary disease), Diverticulosis, DJD (degenerative joint disease), Emphysema lung, Gait abnormality, Hypertension, and Shortness of breath ,  does not have any pertinent problems on file  ,   has a past surgical history that includes Colon surgery; Colonoscopy w/ polypectomy (N/A, 1/21/2019); Bladder surgery; Small intestine surgery (03/2020); Gastrojejunostomy w/ jejunostomy tube (N/A, 2/3/2020); Colonoscopy; and pr injection,sacroiliac joint (Right, 9/2/2020)  ,  family history includes Heart disease in her mother  ,   reports that she quit smoking about 6 years ago  She has never used smokeless tobacco  She reports previous alcohol use  She reports previous drug use ,  is allergic to bee venom and fluticasone     Current Outpatient Medications   Medication Sig Dispense Refill    acetaminophen (TYLENOL) 325 mg tablet Take 2 tablets (650 mg total) by mouth every 6 (six) hours as needed for mild pain, headaches or fever 30 tablet 0    albuterol (Ventolin HFA) 90 mcg/act inhaler Inhale 2 puffs every 6 (six) hours as needed for wheezing 1 Inhaler 5    alendronate (FOSAMAX) 70 mg tablet Take 1 tablet (70 mg total) by mouth every 7 days 4 tablet 5    apixaban (ELIQUIS) 5 mg Take 1 tablet (5 mg total) by mouth 2 (two) times a day 60 tablet 5    Artificial Tear Solution (GENTEAL TEARS) 0 1-0 2-0 3 % SOLN Administer 1 drop to both eyes 3 (three) times a day      budesonide-formoterol (SYMBICORT) 160-4 5 mcg/act inhaler Inhale 2 puffs 2 (two) times a day Rinse mouth after use        Calcium Carb-Cholecalciferol (CALCIUM 1000 + D PO) Take 1,000 mg by mouth daily      gabapentin (NEURONTIN) 300 mg capsule Take 1 capsule (300 mg total) by mouth 2 (two) times a day 60 capsule 1    ipratropium (ATROVENT HFA) 17 mcg/act inhaler Inhale 2 puffs every 6 (six) hours 3 Inhaler 1    ipratropium-albuterol (DUO-NEB) 0 5-2 5 mg/3 mL nebulizer solution Take 1 vial (3 mL total) by nebulization every 6 (six) hours while awake 300 mL 0    iron polysaccharides (FERREX) 150 mg capsule Take 1 capsule (150 mg total) by mouth 2 (two) times a day 60 capsule 5    metoprolol tartrate (LOPRESSOR) 25 mg tablet Take 0 5 tablets (12 5 mg total) by mouth every 12 (twelve) hours 90 tablet 0    montelukast (SINGULAIR) 10 mg tablet Take 1 tablet (10 mg total) by mouth daily at bedtime 90 tablet 1    oxyCODONE-acetaminophen (PERCOCET) 5-325 mg per tablet Take 1 tablet by mouth every 6 (six) hours as needed for moderate pain Ok to fill today- 07/24/2020Max Daily Amount: 4 tablets 120 tablet 0    pantoprazole (PROTONIX) 40 mg tablet Take 1 tablet (40 mg total) by mouth 2 (two) times a day 60 tablet 5    predniSONE 5 mg tablet Take 1 tablet (5 mg total) by mouth 2 (two) times a day with meals 60 tablet 1    psyllium (METAMUCIL) packet Take 1 packet by mouth 3 (three) times a day 100 packet 1    simethicone (MYLICON) 80 mg chewable tablet Chew 1 tablet (80 mg total) every 6 (six) hours as needed for flatulence 30 tablet 0    theophylline (CORTNEY-24) 200 MG 24 hr capsule Take 1 capsule (200 mg total) by mouth daily 30 capsule 0    fluticasone-umeclidinium-vilanterol (Trelegy Ellipta) 100-62 5-25 MCG/INH inhaler Inhale 1 puff daily Rinse mouth after use  (Patient not taking: Reported on 8/30/2020) 3 Inhaler 1    predniSONE 50 mg tablet Take 1 tablet (50 mg total) by mouth daily for 4 days (Patient not taking: Reported on 9/4/2020) 4 tablet 0     No current facility-administered medications for this visit  Review of Systems   Constitutional: Negative for chills, fatigue and fever  HENT: Negative  Respiratory: Positive for shortness of breath  Negative for cough and chest tightness  Cardiovascular: Negative for chest pain, palpitations and leg swelling  Gastrointestinal: Negative for abdominal pain, constipation, diarrhea, nausea and vomiting  Genitourinary: Negative  Musculoskeletal: Negative for arthralgias, back pain and myalgias  Skin: Negative  Neurological: Negative  Psychiatric/Behavioral: Negative  Objective:  Vitals:    09/04/20 0906   BP: 118/68   BP Location: Right arm   Patient Position: Sitting   Cuff Size: Standard   Pulse: 80   Resp: 16   Temp: 99 7 °F (37 6 °C)   SpO2: 92%   Weight: 58 7 kg (129 lb 6 4 oz)   Height: 5' (1 524 m)     Body mass index is 25 27 kg/m²  Physical Exam  Vitals signs and nursing note reviewed  Constitutional:       Appearance: She is well-developed  HENT:      Head: Normocephalic and atraumatic  Eyes:      Pupils: Pupils are equal, round, and reactive to light  Neck:      Musculoskeletal: Normal range of motion and neck supple  Cardiovascular:      Rate and Rhythm: Normal rate  Rhythm irregular  Heart sounds: Normal heart sounds  No murmur     Pulmonary:      Effort: Pulmonary effort is normal  No respiratory distress  Breath sounds: Decreased breath sounds present  No wheezing  Abdominal:      General: Bowel sounds are normal       Palpations: Abdomen is soft  Tenderness: There is no abdominal tenderness  Musculoskeletal: Normal range of motion  Skin:     General: Skin is warm and dry  Neurological:      Mental Status: She is alert and oriented to person, place, and time            PHQ-9 Depression Screening    PHQ-9:    Frequency of the following problems over the past two weeks:

## 2020-09-08 NOTE — OP NOTE
OPERATIVE REPORT  PATIENT NAME: Casandra Callahan    :  1935  MRN: [id number]  Pt Location: [facility] OR ROOM 01    SURGERY DATE: 2020    Surgeon(s) and Role:     * Santo Weinstein MD - Primary    Preop Diagnosis:  Sacroiliitis ([address] ) [M46 1]    Post-Op Diagnosis Codes:     * Sacroiliitis ([address] ) [M46 1]    Procedure(s) (LRB):  BLOCK / INJECTION SACROILIAC (Right)    Specimen(s):  * No specimens in log *    Estimated Blood Loss:   Minimal    Drains:  * No LDAs found *    Anesthesia Type:   Local    Operative Indications:  Sacroiliitis ([address] ) [M46 1]      Operative Findings:  same    Complications:   None    Procedure and Technique:  Fluoroscopically-guided injection of the right sacroiliac joint(s)    After discussing the risks, benefits, and alternatives to the procedure, the patient expressed understanding and wished to proceed  The patient was brought to the fluoroscopy suite and placed in the prone position  Procedural pause conducted to verify:  correct patient identity, procedure to be performed and as applicable, correct side and site, correct patient position, and availability of implants, special equipment and special requirements  Using fluoroscopy, the inferior portion of the right sacroiliac joint was identified  The skin was sterilely prepped and draped in the usual fashion using Chloraprep skin prep  The skin and subcutaneous tissue were anesthetized with 0 5% lidocaine  Using fluoroscopic guidance, a 3 5 inch 22 gauge spinal needle was advanced into joint  Proper needle positioning was confirmed using multiple fluoroscopic views  After negative aspiration, 0 5 mL Omnipaque 300 contrast was injected, showing intraarticular spread of contrast without any evidence of intravascular uptake  A 2 5 mL solution consisting of 40 mg of Depo-Medrol in 0 25% bupivacaine was injected slowly and incrementally into the joint    Following the injection, the needle was withdrawn slightly and flushed with lidocaine as it was fully extracted  The patient tolerated the procedure well and there were no apparent complications  After appropriate observation, the patient was dismissed from the clinic in good condition under their own power  COMMENTS  The patient received a total steroid dose of 40 mg Depo-Medrol     I was present for the entire procedure    Patient Disposition:  hemodynamically stable    SIGNATURE: Victoria Espino MD  DATE: September 8, 2020  TIME: 9:58 AM

## 2020-09-09 ENCOUNTER — TELEPHONE (OUTPATIENT)
Dept: PAIN MEDICINE | Facility: CLINIC | Age: 85
End: 2020-09-09

## 2020-09-14 ENCOUNTER — TELEPHONE (OUTPATIENT)
Dept: PAIN MEDICINE | Facility: MEDICAL CENTER | Age: 85
End: 2020-09-14

## 2020-09-14 NOTE — TELEPHONE ENCOUNTER
Pt called stating that she would like to s/w nurse regarding her injection   Pt can be reached at [phone]

## 2020-09-14 NOTE — TELEPHONE ENCOUNTER
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

## 2020-09-27 ENCOUNTER — APPOINTMENT (EMERGENCY)
Dept: RADIOLOGY | Facility: HOSPITAL | Age: 85
End: 2020-09-27
Payer: MEDICARE

## 2020-09-27 ENCOUNTER — HOSPITAL ENCOUNTER (EMERGENCY)
Facility: HOSPITAL | Age: 85
Discharge: HOME | End: 2020-09-27
Attending: FAMILY MEDICINE
Payer: MEDICARE

## 2020-09-27 VITALS
RESPIRATION RATE: 18 BRPM | SYSTOLIC BLOOD PRESSURE: 131 MMHG | HEIGHT: 60 IN | DIASTOLIC BLOOD PRESSURE: 64 MMHG | OXYGEN SATURATION: 99 % | WEIGHT: 123 LBS | HEART RATE: 90 BPM | BODY MASS INDEX: 24.15 KG/M2 | TEMPERATURE: 98 F

## 2020-09-27 DIAGNOSIS — J45.901 ASTHMA EXACERBATION: Primary | ICD-10-CM

## 2020-09-27 LAB
ANION GAP SERPL CALCULATED.3IONS-SCNC: 7 MMOL/L (ref 4–13)
BASOPHILS # BLD AUTO: 0.1 THOUSANDS/ΜL (ref 0–0.1)
BASOPHILS NFR BLD AUTO: 1 % (ref 0–2)
BUN SERPL-MCNC: 17 MG/DL (ref 7–25)
CALCIUM SERPL-MCNC: 9.5 MG/DL (ref 8.6–10.5)
CHLORIDE SERPL-SCNC: 102 MMOL/L (ref 98–107)
CO2 SERPL-SCNC: 30 MMOL/L (ref 21–31)
CREAT SERPL-MCNC: 0.53 MG/DL (ref 0.6–1.2)
EOSINOPHIL # BLD AUTO: 0 THOUSAND/ΜL (ref 0–0.61)
EOSINOPHIL NFR BLD AUTO: 0 % (ref 0–5)
ERYTHROCYTE [DISTWIDTH] IN BLOOD BY AUTOMATED COUNT: 15.7 % (ref 11.5–14.5)
GFR SERPL CREATININE-BSD FRML MDRD: 88 ML/MIN/1.73SQ M
GLUCOSE SERPL-MCNC: 140 MG/DL (ref 65–99)
HCT VFR BLD AUTO: 37.4 % (ref 42–47)
HGB BLD-MCNC: 12.7 G/DL (ref 12–16)
LYMPHOCYTES # BLD AUTO: 1 THOUSANDS/ΜL (ref 0.6–4.47)
LYMPHOCYTES NFR BLD AUTO: 11 % (ref 21–51)
MCH RBC QN AUTO: 28.6 PG (ref 26–34)
MCHC RBC AUTO-ENTMCNC: 33.9 G/DL (ref 31–37)
MCV RBC AUTO: 84 FL (ref 81–99)
MONOCYTES # BLD AUTO: 0.5 THOUSAND/ΜL (ref 0.17–1.22)
MONOCYTES NFR BLD AUTO: 5 % (ref 2–12)
NEUTROPHILS # BLD AUTO: 7.1 THOUSANDS/ΜL (ref 1.4–6.5)
NEUTS SEG NFR BLD AUTO: 83 % (ref 42–75)
PLATELET # BLD AUTO: 218 THOUSANDS/UL (ref 149–390)
PMV BLD AUTO: 7.4 FL (ref 8.6–11.7)
POTASSIUM SERPL-SCNC: 4.3 MMOL/L (ref 3.5–5.5)
RBC # BLD AUTO: 4.43 MILLION/UL (ref 3.9–5.2)
SODIUM SERPL-SCNC: 139 MMOL/L (ref 134–143)
WBC # BLD AUTO: 8.6 THOUSAND/UL (ref 4.8–10.8)

## 2020-09-27 PROCEDURE — 99284 EMERGENCY DEPT VISIT MOD MDM: CPT | Performed by: PHYSICIAN ASSISTANT

## 2020-09-27 PROCEDURE — 36415 COLL VENOUS BLD VENIPUNCTURE: CPT | Performed by: PHYSICIAN ASSISTANT

## 2020-09-27 PROCEDURE — 94760 N-INVAS EAR/PLS OXIMETRY 1: CPT

## 2020-09-27 PROCEDURE — 94640 AIRWAY INHALATION TREATMENT: CPT

## 2020-09-27 PROCEDURE — 71045 X-RAY EXAM CHEST 1 VIEW: CPT

## 2020-09-27 PROCEDURE — 80048 BASIC METABOLIC PNL TOTAL CA: CPT | Performed by: PHYSICIAN ASSISTANT

## 2020-09-27 PROCEDURE — 99285 EMERGENCY DEPT VISIT HI MDM: CPT

## 2020-09-27 PROCEDURE — 85025 COMPLETE CBC W/AUTO DIFF WBC: CPT | Performed by: PHYSICIAN ASSISTANT

## 2020-09-27 RX ORDER — ALBUTEROL SULFATE 2.5 MG/3ML
5 SOLUTION RESPIRATORY (INHALATION) ONCE
Status: COMPLETED | OUTPATIENT
Start: 2020-09-27 | End: 2020-09-27

## 2020-09-27 RX ORDER — METHYLPREDNISOLONE 4 MG/1
TABLET ORAL
Qty: 21 TABLET | Refills: 0 | Status: SHIPPED | OUTPATIENT
Start: 2020-09-27 | End: 2020-11-27 | Stop reason: SDUPTHER

## 2020-09-27 RX ORDER — FUROSEMIDE 20 MG/1
20 TABLET ORAL 2 TIMES DAILY
COMMUNITY
End: 2020-11-24 | Stop reason: SDUPTHER

## 2020-09-27 RX ADMIN — ALBUTEROL SULFATE 5 MG: 2.5 SOLUTION RESPIRATORY (INHALATION) at 11:03

## 2020-09-27 RX ADMIN — PREDNISONE 50 MG: 10 TABLET ORAL at 10:38

## 2020-09-27 RX ADMIN — IPRATROPIUM BROMIDE 0.5 MG: 0.5 SOLUTION RESPIRATORY (INHALATION) at 11:03

## 2020-10-07 ENCOUNTER — OFFICE VISIT (OUTPATIENT)
Dept: INTERNAL MEDICINE CLINIC | Facility: CLINIC | Age: 85
End: 2020-10-07
Payer: MEDICARE

## 2020-10-07 DIAGNOSIS — M54.16 LUMBAR RADICULOPATHY: ICD-10-CM

## 2020-10-07 DIAGNOSIS — J41.0 SIMPLE CHRONIC BRONCHITIS (HCC): Primary | ICD-10-CM

## 2020-10-07 PROCEDURE — 99214 OFFICE O/P EST MOD 30 MIN: CPT | Performed by: INTERNAL MEDICINE

## 2020-10-07 RX ORDER — PREDNISONE 10 MG/1
TABLET ORAL
Qty: 90 TABLET | Refills: 2 | Status: ON HOLD | OUTPATIENT
Start: 2020-10-07 | End: 2020-12-12 | Stop reason: CLARIF

## 2020-10-07 RX ORDER — DOXYCYCLINE HYCLATE 100 MG
100 TABLET ORAL 2 TIMES DAILY
Qty: 14 TABLET | Refills: 5 | Status: SHIPPED | OUTPATIENT
Start: 2020-10-07 | End: 2020-10-14

## 2020-10-09 ENCOUNTER — HOSPITAL ENCOUNTER (OUTPATIENT)
Dept: RADIOLOGY | Facility: HOSPITAL | Age: 85
Discharge: HOME | End: 2020-10-09
Attending: ANESTHESIOLOGY | Admitting: ANESTHESIOLOGY
Payer: MEDICARE

## 2020-10-09 VITALS
TEMPERATURE: 98.4 F | OXYGEN SATURATION: 96 % | SYSTOLIC BLOOD PRESSURE: 128 MMHG | DIASTOLIC BLOOD PRESSURE: 68 MMHG | RESPIRATION RATE: 18 BRPM | HEART RATE: 82 BPM

## 2020-10-09 DIAGNOSIS — M46.1 SACROILIITIS, NOT ELSEWHERE CLASSIFIED (HCC): ICD-10-CM

## 2020-10-09 RX ORDER — LIDOCAINE HYDROCHLORIDE 10 MG/ML
5 INJECTION, SOLUTION EPIDURAL; INFILTRATION; INTRACAUDAL; PERINEURAL ONCE
Status: COMPLETED | OUTPATIENT
Start: 2020-10-09 | End: 2020-10-09

## 2020-10-09 RX ORDER — BUPIVACAINE HYDROCHLORIDE 5 MG/ML
2 INJECTION, SOLUTION EPIDURAL; INTRACAUDAL ONCE
Status: COMPLETED | OUTPATIENT
Start: 2020-10-09 | End: 2020-10-09

## 2020-10-09 RX ORDER — METHYLPREDNISOLONE ACETATE 40 MG/ML
80 INJECTION, SUSPENSION INTRA-ARTICULAR; INTRALESIONAL; INTRAMUSCULAR; PARENTERAL; SOFT TISSUE ONCE
Status: COMPLETED | OUTPATIENT
Start: 2020-10-09 | End: 2020-10-09

## 2020-10-09 RX ORDER — BUPIVACAINE HCL/PF 2.5 MG/ML
2 VIAL (ML) INJECTION ONCE
Status: DISCONTINUED | OUTPATIENT
Start: 2020-10-09 | End: 2020-10-09

## 2020-10-09 RX ADMIN — BUPIVACAINE HYDROCHLORIDE 2 ML: 5 INJECTION, SOLUTION EPIDURAL; INTRACAUDAL at 09:50

## 2020-10-09 RX ADMIN — LIDOCAINE HYDROCHLORIDE 3 ML: 10 INJECTION, SOLUTION EPIDURAL; INFILTRATION; INTRACAUDAL; PERINEURAL at 09:48

## 2020-10-09 RX ADMIN — IOHEXOL 1 ML: 300 INJECTION, SOLUTION INTRAVENOUS at 09:49

## 2020-10-09 RX ADMIN — METHYLPREDNISOLONE ACETATE 80 MG: 40 INJECTION, SUSPENSION INTRA-ARTICULAR; INTRALESIONAL; INTRAMUSCULAR; SOFT TISSUE at 09:50

## 2020-10-12 ENCOUNTER — TELEPHONE (OUTPATIENT)
Dept: PAIN MEDICINE | Facility: CLINIC | Age: 85
End: 2020-10-12

## 2020-10-12 DIAGNOSIS — G89.4 CHRONIC PAIN SYNDROME: ICD-10-CM

## 2020-10-12 DIAGNOSIS — S32.040D COMPRESSION FRACTURE OF L4 VERTEBRA WITH ROUTINE HEALING, SUBSEQUENT ENCOUNTER: ICD-10-CM

## 2020-10-12 RX ORDER — OXYCODONE HYDROCHLORIDE AND ACETAMINOPHEN 5; 325 MG/1; MG/1
1 TABLET ORAL EVERY 6 HOURS PRN
Qty: 120 TABLET | Refills: 0 | Status: SHIPPED | OUTPATIENT
Start: 2020-10-12 | End: 2020-10-28 | Stop reason: SDUPTHER

## 2020-10-16 ENCOUNTER — TELEPHONE (OUTPATIENT)
Dept: PAIN MEDICINE | Facility: CLINIC | Age: 85
End: 2020-10-16

## 2020-10-26 ENCOUNTER — TELEPHONE (OUTPATIENT)
Dept: OTHER | Facility: OTHER | Age: 85
End: 2020-10-26

## 2020-10-26 DIAGNOSIS — J30.2 SEASONAL ALLERGIES: ICD-10-CM

## 2020-10-26 DIAGNOSIS — S32.040D COMPRESSION FRACTURE OF L4 VERTEBRA WITH ROUTINE HEALING, SUBSEQUENT ENCOUNTER: ICD-10-CM

## 2020-10-26 DIAGNOSIS — G89.4 CHRONIC PAIN SYNDROME: ICD-10-CM

## 2020-10-26 DIAGNOSIS — J96.11 CHRONIC RESPIRATORY FAILURE WITH HYPOXIA (HCC): ICD-10-CM

## 2020-10-26 RX ORDER — MONTELUKAST SODIUM 10 MG/1
TABLET ORAL
Qty: 90 TABLET | Refills: 1 | Status: SHIPPED | OUTPATIENT
Start: 2020-10-26 | End: 2021-01-01 | Stop reason: SDUPTHER

## 2020-10-26 RX ORDER — GABAPENTIN 300 MG/1
CAPSULE ORAL
Qty: 90 CAPSULE | Refills: 2 | Status: SHIPPED | OUTPATIENT
Start: 2020-10-26 | End: 2021-01-01

## 2020-10-26 RX ORDER — PREDNISONE 1 MG/1
TABLET ORAL
Qty: 60 TABLET | Refills: 1 | Status: SHIPPED | OUTPATIENT
Start: 2020-10-26 | End: 2021-01-01

## 2020-10-28 ENCOUNTER — OFFICE VISIT (OUTPATIENT)
Dept: PAIN MEDICINE | Facility: CLINIC | Age: 85
End: 2020-10-28
Payer: MEDICARE

## 2020-10-28 VITALS
SYSTOLIC BLOOD PRESSURE: 128 MMHG | WEIGHT: 123 LBS | HEART RATE: 82 BPM | TEMPERATURE: 98.5 F | HEIGHT: 60 IN | BODY MASS INDEX: 24.15 KG/M2 | DIASTOLIC BLOOD PRESSURE: 68 MMHG

## 2020-10-28 DIAGNOSIS — S22.080D CLOSED WEDGE COMPRESSION FRACTURE OF T12 VERTEBRA WITH ROUTINE HEALING, SUBSEQUENT ENCOUNTER: ICD-10-CM

## 2020-10-28 DIAGNOSIS — M46.1 SACROILIITIS (HCC): ICD-10-CM

## 2020-10-28 DIAGNOSIS — M54.50 CHRONIC RIGHT-SIDED LOW BACK PAIN WITHOUT SCIATICA: ICD-10-CM

## 2020-10-28 DIAGNOSIS — G89.4 CHRONIC PAIN SYNDROME: Primary | ICD-10-CM

## 2020-10-28 DIAGNOSIS — G89.29 CHRONIC RIGHT-SIDED LOW BACK PAIN WITHOUT SCIATICA: ICD-10-CM

## 2020-10-28 DIAGNOSIS — S32.040D COMPRESSION FRACTURE OF L4 VERTEBRA WITH ROUTINE HEALING, SUBSEQUENT ENCOUNTER: ICD-10-CM

## 2020-10-28 DIAGNOSIS — M79.18 MYOFASCIAL PAIN SYNDROME: ICD-10-CM

## 2020-10-28 PROCEDURE — 99213 OFFICE O/P EST LOW 20 MIN: CPT | Performed by: NURSE PRACTITIONER

## 2020-10-28 RX ORDER — LIDOCAINE 50 MG/G
1 PATCH TOPICAL DAILY
Qty: 30 PATCH | Refills: 1 | Status: SHIPPED | OUTPATIENT
Start: 2020-10-28 | End: 2020-12-23

## 2020-10-28 RX ORDER — OXYCODONE HYDROCHLORIDE AND ACETAMINOPHEN 5; 325 MG/1; MG/1
1 TABLET ORAL EVERY 6 HOURS PRN
Qty: 120 TABLET | Refills: 0 | Status: SHIPPED | OUTPATIENT
Start: 2020-10-28 | End: 2020-12-23 | Stop reason: SDUPTHER

## 2020-10-29 ENCOUNTER — TELEPHONE (OUTPATIENT)
Dept: PAIN MEDICINE | Facility: CLINIC | Age: 85
End: 2020-10-29

## 2020-10-29 ENCOUNTER — TELEPHONE (OUTPATIENT)
Dept: INTERNAL MEDICINE CLINIC | Facility: CLINIC | Age: 85
End: 2020-10-29

## 2020-11-10 DIAGNOSIS — J41.0 SIMPLE CHRONIC BRONCHITIS (HCC): Chronic | ICD-10-CM

## 2020-11-11 ENCOUNTER — HOSPITAL ENCOUNTER (EMERGENCY)
Facility: HOSPITAL | Age: 85
Discharge: HOME | End: 2020-11-11
Attending: EMERGENCY MEDICINE | Admitting: EMERGENCY MEDICINE
Payer: MEDICARE

## 2020-11-11 ENCOUNTER — APPOINTMENT (EMERGENCY)
Dept: RADIOLOGY | Facility: HOSPITAL | Age: 85
End: 2020-11-11
Attending: EMERGENCY MEDICINE
Payer: MEDICARE

## 2020-11-11 VITALS
DIASTOLIC BLOOD PRESSURE: 80 MMHG | HEIGHT: 60 IN | OXYGEN SATURATION: 97 % | SYSTOLIC BLOOD PRESSURE: 172 MMHG | WEIGHT: 123 LBS | HEART RATE: 74 BPM | TEMPERATURE: 98 F | RESPIRATION RATE: 28 BRPM | BODY MASS INDEX: 24.15 KG/M2

## 2020-11-11 DIAGNOSIS — I10 HTN (HYPERTENSION): ICD-10-CM

## 2020-11-11 DIAGNOSIS — J45.901 ACUTE ASTHMA EXACERBATION: Primary | ICD-10-CM

## 2020-11-11 LAB
ALBUMIN SERPL BCP-MCNC: 4.1 G/DL (ref 3.5–5.7)
ALP SERPL-CCNC: 29 U/L (ref 55–165)
ALT SERPL W P-5'-P-CCNC: 18 U/L (ref 7–52)
ANION GAP SERPL CALCULATED.3IONS-SCNC: 8 MMOL/L (ref 4–13)
APTT PPP: 24 SECONDS (ref 23–37)
AST SERPL W P-5'-P-CCNC: 12 U/L (ref 13–39)
ATRIAL RATE: 79 BPM
BILIRUB SERPL-MCNC: 0.4 MG/DL (ref 0.2–1)
BILIRUB UR QL STRIP: NEGATIVE
BNP SERPL-MCNC: 71 PG/ML (ref 1–100)
BUN SERPL-MCNC: 20 MG/DL (ref 7–25)
CALCIUM SERPL-MCNC: 9.5 MG/DL (ref 8.6–10.5)
CHLORIDE SERPL-SCNC: 102 MMOL/L (ref 98–107)
CLARITY UR: CLEAR
CO2 SERPL-SCNC: 28 MMOL/L (ref 21–31)
COLOR UR: YELLOW
CREAT SERPL-MCNC: 0.65 MG/DL (ref 0.6–1.2)
ERYTHROCYTE [DISTWIDTH] IN BLOOD BY AUTOMATED COUNT: 14.7 % (ref 11.5–14.5)
GFR SERPL CREATININE-BSD FRML MDRD: 82 ML/MIN/1.73SQ M
GLUCOSE SERPL-MCNC: 156 MG/DL (ref 65–99)
GLUCOSE UR STRIP-MCNC: NEGATIVE MG/DL
HCT VFR BLD AUTO: 37.5 % (ref 42–47)
HGB BLD-MCNC: 12.1 G/DL (ref 12–16)
HGB UR QL STRIP.AUTO: NEGATIVE
INR PPP: 1.11 (ref 0.84–1.19)
KETONES UR STRIP-MCNC: NEGATIVE MG/DL
LEUKOCYTE ESTERASE UR QL STRIP: NEGATIVE
LYMPHOCYTES # BLD AUTO: 1.33 THOUSAND/UL (ref 0.6–4.47)
LYMPHOCYTES # BLD AUTO: 14 % (ref 20–51)
MCH RBC QN AUTO: 26.8 PG (ref 26–34)
MCHC RBC AUTO-ENTMCNC: 32.3 G/DL (ref 31–37)
MCV RBC AUTO: 83 FL (ref 81–99)
MONOCYTES # BLD AUTO: 0.57 THOUSAND/UL (ref 0–1.22)
MONOCYTES NFR BLD AUTO: 6 % (ref 4–12)
NEUTS BAND NFR BLD MANUAL: 5 % (ref 0–8)
NEUTS SEG # BLD: 7.6 THOUSAND/UL (ref 1.81–6.82)
NEUTS SEG NFR BLD AUTO: 75 % (ref 42–75)
NITRITE UR QL STRIP: NEGATIVE
P AXIS: 83 DEGREES
PH UR STRIP.AUTO: 6.5 [PH]
PLATELET # BLD AUTO: 223 THOUSANDS/UL (ref 149–390)
PLATELET BLD QL SMEAR: ADEQUATE
PMV BLD AUTO: 7.5 FL (ref 8.6–11.7)
POTASSIUM SERPL-SCNC: 4.4 MMOL/L (ref 3.5–5.5)
PR INTERVAL: 118 MS
PROT SERPL-MCNC: 6.2 G/DL (ref 6.4–8.9)
PROT UR STRIP-MCNC: NEGATIVE MG/DL
PROTHROMBIN TIME: 14.2 SECONDS (ref 11.6–14.5)
QRS AXIS: -47 DEGREES
QRSD INTERVAL: 82 MS
QT INTERVAL: 352 MS
QTC INTERVAL: 454 MS
RBC # BLD AUTO: 4.53 MILLION/UL (ref 3.9–5.2)
RBC MORPH BLD: NORMAL
SODIUM SERPL-SCNC: 138 MMOL/L (ref 134–143)
SP GR UR STRIP.AUTO: 1.01 (ref 1–1.03)
T WAVE AXIS: 53 DEGREES
TOTAL CELLS COUNTED SPEC: 100
TROPONIN I SERPL-MCNC: <0.03 NG/ML
TROPONIN I SERPL-MCNC: <0.03 NG/ML
UROBILINOGEN UR QL STRIP.AUTO: 0.2 E.U./DL
VENTRICULAR RATE: 100 BPM
WBC # BLD AUTO: 9.5 THOUSAND/UL (ref 4.8–10.8)

## 2020-11-11 PROCEDURE — 99285 EMERGENCY DEPT VISIT HI MDM: CPT

## 2020-11-11 PROCEDURE — 85730 THROMBOPLASTIN TIME PARTIAL: CPT | Performed by: EMERGENCY MEDICINE

## 2020-11-11 PROCEDURE — 94640 AIRWAY INHALATION TREATMENT: CPT

## 2020-11-11 PROCEDURE — 83880 ASSAY OF NATRIURETIC PEPTIDE: CPT | Performed by: EMERGENCY MEDICINE

## 2020-11-11 PROCEDURE — 85610 PROTHROMBIN TIME: CPT | Performed by: EMERGENCY MEDICINE

## 2020-11-11 PROCEDURE — 84484 ASSAY OF TROPONIN QUANT: CPT | Performed by: EMERGENCY MEDICINE

## 2020-11-11 PROCEDURE — 71045 X-RAY EXAM CHEST 1 VIEW: CPT

## 2020-11-11 PROCEDURE — 93010 ELECTROCARDIOGRAM REPORT: CPT | Performed by: INTERNAL MEDICINE

## 2020-11-11 PROCEDURE — 80053 COMPREHEN METABOLIC PANEL: CPT | Performed by: EMERGENCY MEDICINE

## 2020-11-11 PROCEDURE — 36415 COLL VENOUS BLD VENIPUNCTURE: CPT | Performed by: EMERGENCY MEDICINE

## 2020-11-11 PROCEDURE — 93005 ELECTROCARDIOGRAM TRACING: CPT

## 2020-11-11 PROCEDURE — 99285 EMERGENCY DEPT VISIT HI MDM: CPT | Performed by: EMERGENCY MEDICINE

## 2020-11-11 PROCEDURE — 85027 COMPLETE CBC AUTOMATED: CPT | Performed by: EMERGENCY MEDICINE

## 2020-11-11 PROCEDURE — 81003 URINALYSIS AUTO W/O SCOPE: CPT

## 2020-11-11 PROCEDURE — 85007 BL SMEAR W/DIFF WBC COUNT: CPT | Performed by: EMERGENCY MEDICINE

## 2020-11-11 RX ORDER — SODIUM CHLORIDE FOR INHALATION 0.9 %
3 VIAL, NEBULIZER (ML) INHALATION ONCE
Status: COMPLETED | OUTPATIENT
Start: 2020-11-11 | End: 2020-11-11

## 2020-11-11 RX ADMIN — ALBUTEROL SULFATE 5 MG: 2.5 SOLUTION RESPIRATORY (INHALATION) at 08:40

## 2020-11-11 RX ADMIN — ISODIUM CHLORIDE 3 ML: 0.03 SOLUTION RESPIRATORY (INHALATION) at 08:40

## 2020-11-11 RX ADMIN — IPRATROPIUM BROMIDE 0.5 MG: 0.5 SOLUTION RESPIRATORY (INHALATION) at 08:40

## 2020-11-11 RX ADMIN — PREDNISONE 50 MG: 10 TABLET ORAL at 09:06

## 2020-11-24 ENCOUNTER — TELEPHONE (OUTPATIENT)
Dept: INTERNAL MEDICINE CLINIC | Facility: CLINIC | Age: 85
End: 2020-11-24

## 2020-11-24 DIAGNOSIS — J96.11 CHRONIC RESPIRATORY FAILURE WITH HYPOXIA (HCC): Primary | Chronic | ICD-10-CM

## 2020-11-24 RX ORDER — FUROSEMIDE 20 MG/1
20 TABLET ORAL 2 TIMES DAILY
Qty: 60 TABLET | Refills: 5 | Status: ON HOLD | OUTPATIENT
Start: 2020-11-24 | End: 2021-01-02 | Stop reason: SDUPTHER

## 2020-11-27 ENCOUNTER — TELEPHONE (OUTPATIENT)
Dept: INTERNAL MEDICINE CLINIC | Facility: CLINIC | Age: 85
End: 2020-11-27

## 2020-11-27 DIAGNOSIS — J41.0 SIMPLE CHRONIC BRONCHITIS (HCC): Primary | ICD-10-CM

## 2020-11-27 DIAGNOSIS — J45.901 ASTHMA EXACERBATION: ICD-10-CM

## 2020-11-27 RX ORDER — METHYLPREDNISOLONE 4 MG/1
TABLET ORAL
Qty: 21 TABLET | Refills: 0 | Status: ON HOLD | OUTPATIENT
Start: 2020-11-27 | End: 2020-12-12 | Stop reason: CLARIF

## 2020-11-27 RX ORDER — DOXYCYCLINE HYCLATE 100 MG/1
100 CAPSULE ORAL EVERY 12 HOURS SCHEDULED
Qty: 14 CAPSULE | Refills: 0 | Status: SHIPPED | OUTPATIENT
Start: 2020-11-27 | End: 2020-12-04

## 2020-11-30 ENCOUNTER — TELEMEDICINE (OUTPATIENT)
Dept: INTERNAL MEDICINE CLINIC | Facility: CLINIC | Age: 85
End: 2020-11-30
Payer: MEDICARE

## 2020-11-30 VITALS — BODY MASS INDEX: 24.02 KG/M2 | HEIGHT: 60 IN

## 2020-11-30 DIAGNOSIS — J41.0 SIMPLE CHRONIC BRONCHITIS (HCC): Primary | Chronic | ICD-10-CM

## 2020-11-30 PROCEDURE — 99213 OFFICE O/P EST LOW 20 MIN: CPT | Performed by: INTERNAL MEDICINE

## 2020-12-04 DIAGNOSIS — J41.0 SIMPLE CHRONIC BRONCHITIS (HCC): ICD-10-CM

## 2020-12-04 NOTE — TELEPHONE ENCOUNTER
Pt needs metoprolol tartrate (LOPRESSOR) 25 mg tablet refilled, send to [facility] in [facility]

## 2020-12-05 DIAGNOSIS — I50.9 CONGESTIVE HEART FAILURE (HCC): ICD-10-CM

## 2020-12-05 RX ORDER — PREDNISONE 10 MG/1
TABLET ORAL
Qty: 90 TABLET | Refills: 2 | OUTPATIENT
Start: 2020-12-05

## 2020-12-11 ENCOUNTER — TELEPHONE (OUTPATIENT)
Dept: INTERNAL MEDICINE CLINIC | Facility: CLINIC | Age: 85
End: 2020-12-11

## 2020-12-12 ENCOUNTER — APPOINTMENT (EMERGENCY)
Dept: RADIOLOGY | Facility: HOSPITAL | Age: 85
End: 2020-12-12
Payer: MEDICARE

## 2020-12-12 ENCOUNTER — HOSPITAL ENCOUNTER (OUTPATIENT)
Facility: HOSPITAL | Age: 85
Setting detail: OBSERVATION
Discharge: HOME | End: 2020-12-13
Attending: INTERNAL MEDICINE | Admitting: INTERNAL MEDICINE
Payer: MEDICARE

## 2020-12-12 DIAGNOSIS — J44.9 COPD (CHRONIC OBSTRUCTIVE PULMONARY DISEASE) (HCC): Chronic | ICD-10-CM

## 2020-12-12 DIAGNOSIS — R65.10 SIRS (SYSTEMIC INFLAMMATORY RESPONSE SYNDROME) (HCC): ICD-10-CM

## 2020-12-12 DIAGNOSIS — J44.1 COPD WITH ACUTE EXACERBATION (HCC): Primary | ICD-10-CM

## 2020-12-12 DIAGNOSIS — R09.02 HYPOXIA: ICD-10-CM

## 2020-12-12 PROBLEM — J96.21 ACUTE ON CHRONIC RESPIRATORY FAILURE WITH HYPOXIA (HCC): Status: ACTIVE | Noted: 2019-10-07

## 2020-12-12 PROBLEM — E87.6 HYPOKALEMIA: Status: ACTIVE | Noted: 2020-12-12

## 2020-12-12 LAB
ALBUMIN SERPL BCP-MCNC: 3.8 G/DL (ref 3.5–5.7)
ALP SERPL-CCNC: 35 U/L (ref 55–165)
ALT SERPL W P-5'-P-CCNC: 27 U/L (ref 7–52)
ANION GAP SERPL CALCULATED.3IONS-SCNC: 11 MMOL/L (ref 4–13)
APTT PPP: 24 SECONDS (ref 23–37)
AST SERPL W P-5'-P-CCNC: 19 U/L (ref 13–39)
BACTERIA UR QL AUTO: NORMAL /HPF
BASOPHILS # BLD AUTO: 0 THOUSANDS/ΜL (ref 0–0.1)
BASOPHILS NFR BLD AUTO: 0 % (ref 0–2)
BILIRUB SERPL-MCNC: 0.4 MG/DL (ref 0.2–1)
BILIRUB UR QL STRIP: NEGATIVE
BNP SERPL-MCNC: 117 PG/ML (ref 1–100)
BUN SERPL-MCNC: 16 MG/DL (ref 7–25)
CALCIUM SERPL-MCNC: 8.9 MG/DL (ref 8.6–10.5)
CHLORIDE SERPL-SCNC: 97 MMOL/L (ref 98–107)
CLARITY UR: CLEAR
CO2 SERPL-SCNC: 28 MMOL/L (ref 21–31)
COLOR UR: YELLOW
CREAT SERPL-MCNC: 0.68 MG/DL (ref 0.6–1.2)
EOSINOPHIL # BLD AUTO: 0.1 THOUSAND/ΜL (ref 0–0.61)
EOSINOPHIL NFR BLD AUTO: 1 % (ref 0–5)
ERYTHROCYTE [DISTWIDTH] IN BLOOD BY AUTOMATED COUNT: 16 % (ref 11.5–14.5)
FLUAV RNA RESP QL NAA+PROBE: NEGATIVE
FLUBV RNA RESP QL NAA+PROBE: NEGATIVE
GFR SERPL CREATININE-BSD FRML MDRD: 81 ML/MIN/1.73SQ M
GLUCOSE SERPL-MCNC: 156 MG/DL (ref 65–99)
GLUCOSE UR STRIP-MCNC: NEGATIVE MG/DL
HCT VFR BLD AUTO: 36.7 % (ref 42–47)
HGB BLD-MCNC: 11.6 G/DL (ref 12–16)
HGB UR QL STRIP.AUTO: NEGATIVE
INR PPP: 0.96 (ref 0.84–1.19)
KETONES UR STRIP-MCNC: NEGATIVE MG/DL
LACTATE SERPL-SCNC: 1.8 MMOL/L (ref 0.5–2)
LACTATE SERPL-SCNC: 2.6 MMOL/L (ref 0.5–2)
LEUKOCYTE ESTERASE UR QL STRIP: ABNORMAL
LYMPHOCYTES # BLD AUTO: 1.3 THOUSANDS/ΜL (ref 0.6–4.47)
LYMPHOCYTES NFR BLD AUTO: 12 % (ref 21–51)
MAGNESIUM SERPL-MCNC: 1.9 MG/DL (ref 1.9–2.7)
MCH RBC QN AUTO: 25.5 PG (ref 26–34)
MCHC RBC AUTO-ENTMCNC: 31.6 G/DL (ref 31–37)
MCV RBC AUTO: 81 FL (ref 81–99)
MONOCYTES # BLD AUTO: 0.6 THOUSAND/ΜL (ref 0.17–1.22)
MONOCYTES NFR BLD AUTO: 6 % (ref 2–12)
NEUTROPHILS # BLD AUTO: 9 THOUSANDS/ΜL (ref 1.4–6.5)
NEUTS SEG NFR BLD AUTO: 82 % (ref 42–75)
NITRITE UR QL STRIP: NEGATIVE
NON-SQ EPI CELLS URNS QL MICRO: NORMAL /HPF
PH UR STRIP.AUTO: 6 [PH]
PLATELET # BLD AUTO: 229 THOUSANDS/UL (ref 149–390)
PMV BLD AUTO: 8.2 FL (ref 8.6–11.7)
POTASSIUM SERPL-SCNC: 3.3 MMOL/L (ref 3.5–5.5)
PROCALCITONIN SERPL-MCNC: <0.05 NG/ML
PROT SERPL-MCNC: 6 G/DL (ref 6.4–8.9)
PROT UR STRIP-MCNC: NEGATIVE MG/DL
PROTHROMBIN TIME: 12.7 SECONDS (ref 11.6–14.5)
RBC # BLD AUTO: 4.54 MILLION/UL (ref 3.9–5.2)
RBC #/AREA URNS AUTO: NORMAL /HPF
RSV RNA RESP QL NAA+PROBE: NEGATIVE
SARS-COV-2 RNA RESP QL NAA+PROBE: NEGATIVE
SODIUM SERPL-SCNC: 136 MMOL/L (ref 134–143)
SP GR UR STRIP.AUTO: <=1.005 (ref 1–1.03)
TROPONIN I SERPL-MCNC: 0.03 NG/ML
UROBILINOGEN UR QL STRIP.AUTO: 0.2 E.U./DL
WBC # BLD AUTO: 11.1 THOUSAND/UL (ref 4.8–10.8)
WBC #/AREA URNS AUTO: NORMAL /HPF

## 2020-12-12 PROCEDURE — 83735 ASSAY OF MAGNESIUM: CPT | Performed by: INTERNAL MEDICINE

## 2020-12-12 PROCEDURE — 87040 BLOOD CULTURE FOR BACTERIA: CPT | Performed by: INTERNAL MEDICINE

## 2020-12-12 PROCEDURE — 85025 COMPLETE CBC W/AUTO DIFF WBC: CPT | Performed by: INTERNAL MEDICINE

## 2020-12-12 PROCEDURE — 84484 ASSAY OF TROPONIN QUANT: CPT | Performed by: INTERNAL MEDICINE

## 2020-12-12 PROCEDURE — 96361 HYDRATE IV INFUSION ADD-ON: CPT

## 2020-12-12 PROCEDURE — 81001 URINALYSIS AUTO W/SCOPE: CPT | Performed by: INTERNAL MEDICINE

## 2020-12-12 PROCEDURE — 96375 TX/PRO/DX INJ NEW DRUG ADDON: CPT

## 2020-12-12 PROCEDURE — 94664 DEMO&/EVAL PT USE INHALER: CPT

## 2020-12-12 PROCEDURE — 71045 X-RAY EXAM CHEST 1 VIEW: CPT

## 2020-12-12 PROCEDURE — 85730 THROMBOPLASTIN TIME PARTIAL: CPT | Performed by: INTERNAL MEDICINE

## 2020-12-12 PROCEDURE — 36415 COLL VENOUS BLD VENIPUNCTURE: CPT | Performed by: INTERNAL MEDICINE

## 2020-12-12 PROCEDURE — 94760 N-INVAS EAR/PLS OXIMETRY 1: CPT

## 2020-12-12 PROCEDURE — 0241U HB NFCT DS VIR RESP RNA 4 TRGT: CPT | Performed by: INTERNAL MEDICINE

## 2020-12-12 PROCEDURE — 80053 COMPREHEN METABOLIC PANEL: CPT | Performed by: INTERNAL MEDICINE

## 2020-12-12 PROCEDURE — 94640 AIRWAY INHALATION TREATMENT: CPT

## 2020-12-12 PROCEDURE — 83605 ASSAY OF LACTIC ACID: CPT | Performed by: INTERNAL MEDICINE

## 2020-12-12 PROCEDURE — 83880 ASSAY OF NATRIURETIC PEPTIDE: CPT | Performed by: INTERNAL MEDICINE

## 2020-12-12 PROCEDURE — 84145 PROCALCITONIN (PCT): CPT | Performed by: INTERNAL MEDICINE

## 2020-12-12 PROCEDURE — 99285 EMERGENCY DEPT VISIT HI MDM: CPT

## 2020-12-12 PROCEDURE — 99285 EMERGENCY DEPT VISIT HI MDM: CPT | Performed by: INTERNAL MEDICINE

## 2020-12-12 PROCEDURE — 99220 PR INITIAL OBSERVATION CARE/DAY 70 MINUTES: CPT | Performed by: INTERNAL MEDICINE

## 2020-12-12 PROCEDURE — 85610 PROTHROMBIN TIME: CPT | Performed by: INTERNAL MEDICINE

## 2020-12-12 PROCEDURE — 93005 ELECTROCARDIOGRAM TRACING: CPT

## 2020-12-12 PROCEDURE — 96365 THER/PROPH/DIAG IV INF INIT: CPT

## 2020-12-12 PROCEDURE — 87081 CULTURE SCREEN ONLY: CPT | Performed by: INTERNAL MEDICINE

## 2020-12-12 RX ORDER — METHYLPREDNISOLONE SODIUM SUCCINATE 40 MG/ML
40 INJECTION, POWDER, LYOPHILIZED, FOR SOLUTION INTRAMUSCULAR; INTRAVENOUS EVERY 8 HOURS SCHEDULED
Status: DISCONTINUED | OUTPATIENT
Start: 2020-12-12 | End: 2020-12-13 | Stop reason: HOSPADM

## 2020-12-12 RX ORDER — LANOLIN ALCOHOL/MO/W.PET/CERES
6 CREAM (GRAM) TOPICAL
Status: DISCONTINUED | OUTPATIENT
Start: 2020-12-12 | End: 2020-12-13 | Stop reason: HOSPADM

## 2020-12-12 RX ORDER — ONDANSETRON 2 MG/ML
4 INJECTION INTRAMUSCULAR; INTRAVENOUS EVERY 4 HOURS PRN
Status: DISCONTINUED | OUTPATIENT
Start: 2020-12-12 | End: 2020-12-13 | Stop reason: HOSPADM

## 2020-12-12 RX ORDER — B-COMPLEX WITH VITAMIN C
1 TABLET ORAL
Status: DISCONTINUED | OUTPATIENT
Start: 2020-12-13 | End: 2020-12-13 | Stop reason: HOSPADM

## 2020-12-12 RX ORDER — LEVALBUTEROL 1.25 MG/.5ML
1.25 SOLUTION, CONCENTRATE RESPIRATORY (INHALATION)
Status: DISCONTINUED | OUTPATIENT
Start: 2020-12-12 | End: 2020-12-13 | Stop reason: HOSPADM

## 2020-12-12 RX ORDER — PANTOPRAZOLE SODIUM 40 MG/1
40 TABLET, DELAYED RELEASE ORAL ONCE
Status: COMPLETED | OUTPATIENT
Start: 2020-12-12 | End: 2020-12-12

## 2020-12-12 RX ORDER — ACETAMINOPHEN 325 MG/1
650 TABLET ORAL EVERY 6 HOURS PRN
Status: DISCONTINUED | OUTPATIENT
Start: 2020-12-12 | End: 2020-12-13 | Stop reason: HOSPADM

## 2020-12-12 RX ORDER — GABAPENTIN 300 MG/1
300 CAPSULE ORAL 3 TIMES DAILY
Status: DISCONTINUED | OUTPATIENT
Start: 2020-12-12 | End: 2020-12-13 | Stop reason: HOSPADM

## 2020-12-12 RX ORDER — IPRATROPIUM BROMIDE AND ALBUTEROL SULFATE 2.5; .5 MG/3ML; MG/3ML
3 SOLUTION RESPIRATORY (INHALATION)
Status: DISCONTINUED | OUTPATIENT
Start: 2020-12-12 | End: 2020-12-12

## 2020-12-12 RX ORDER — LIDOCAINE 50 MG/G
1 PATCH TOPICAL DAILY
Status: DISCONTINUED | OUTPATIENT
Start: 2020-12-12 | End: 2020-12-13 | Stop reason: HOSPADM

## 2020-12-12 RX ORDER — BUDESONIDE AND FORMOTEROL FUMARATE DIHYDRATE 160; 4.5 UG/1; UG/1
2 AEROSOL RESPIRATORY (INHALATION) 2 TIMES DAILY
Status: DISCONTINUED | OUTPATIENT
Start: 2020-12-12 | End: 2020-12-13 | Stop reason: HOSPADM

## 2020-12-12 RX ORDER — POTASSIUM CHLORIDE 20 MEQ/1
40 TABLET, EXTENDED RELEASE ORAL ONCE
Status: COMPLETED | OUTPATIENT
Start: 2020-12-12 | End: 2020-12-12

## 2020-12-12 RX ORDER — PANTOPRAZOLE SODIUM 40 MG/1
40 TABLET, DELAYED RELEASE ORAL
Status: DISCONTINUED | OUTPATIENT
Start: 2020-12-12 | End: 2020-12-13 | Stop reason: HOSPADM

## 2020-12-12 RX ORDER — ALBUTEROL SULFATE 2.5 MG/3ML
2.5 SOLUTION RESPIRATORY (INHALATION) EVERY 4 HOURS PRN
Status: DISCONTINUED | OUTPATIENT
Start: 2020-12-12 | End: 2020-12-13 | Stop reason: HOSPADM

## 2020-12-12 RX ORDER — LORAZEPAM 2 MG/ML
0.5 INJECTION INTRAMUSCULAR ONCE
Status: COMPLETED | OUTPATIENT
Start: 2020-12-12 | End: 2020-12-12

## 2020-12-12 RX ORDER — OXYCODONE HYDROCHLORIDE AND ACETAMINOPHEN 5; 325 MG/1; MG/1
1 TABLET ORAL EVERY 6 HOURS PRN
Status: DISCONTINUED | OUTPATIENT
Start: 2020-12-12 | End: 2020-12-13 | Stop reason: HOSPADM

## 2020-12-12 RX ORDER — FUROSEMIDE 40 MG/1
20 TABLET ORAL ONCE
Status: COMPLETED | OUTPATIENT
Start: 2020-12-12 | End: 2020-12-12

## 2020-12-12 RX ORDER — MONTELUKAST SODIUM 10 MG/1
10 TABLET ORAL
Status: DISCONTINUED | OUTPATIENT
Start: 2020-12-12 | End: 2020-12-13 | Stop reason: HOSPADM

## 2020-12-12 RX ORDER — CEFTRIAXONE 1 G/50ML
1000 INJECTION, SOLUTION INTRAVENOUS ONCE
Status: COMPLETED | OUTPATIENT
Start: 2020-12-12 | End: 2020-12-12

## 2020-12-12 RX ORDER — IPRATROPIUM BROMIDE AND ALBUTEROL SULFATE 2.5; .5 MG/3ML; MG/3ML
3 SOLUTION RESPIRATORY (INHALATION) ONCE
Status: COMPLETED | OUTPATIENT
Start: 2020-12-12 | End: 2020-12-12

## 2020-12-12 RX ORDER — METHYLPREDNISOLONE SODIUM SUCCINATE 125 MG/2ML
80 INJECTION, POWDER, LYOPHILIZED, FOR SOLUTION INTRAMUSCULAR; INTRAVENOUS ONCE
Status: COMPLETED | OUTPATIENT
Start: 2020-12-12 | End: 2020-12-12

## 2020-12-12 RX ORDER — FUROSEMIDE 20 MG/1
20 TABLET ORAL
Status: DISCONTINUED | OUTPATIENT
Start: 2020-12-12 | End: 2020-12-13 | Stop reason: HOSPADM

## 2020-12-12 RX ADMIN — LEVALBUTEROL HYDROCHLORIDE 1.25 MG: 1.25 SOLUTION, CONCENTRATE RESPIRATORY (INHALATION) at 19:55

## 2020-12-12 RX ADMIN — PANTOPRAZOLE SODIUM 40 MG: 40 TABLET, DELAYED RELEASE ORAL at 17:09

## 2020-12-12 RX ADMIN — METHYLPREDNISOLONE SODIUM SUCCINATE 80 MG: 125 INJECTION, POWDER, FOR SOLUTION INTRAMUSCULAR; INTRAVENOUS at 12:35

## 2020-12-12 RX ADMIN — APIXABAN 2.5 MG: 2.5 TABLET, FILM COATED ORAL at 11:15

## 2020-12-12 RX ADMIN — PSYLLIUM HUSK 1 PACKET: 3.4 POWDER ORAL at 21:13

## 2020-12-12 RX ADMIN — MONTELUKAST SODIUM 10 MG: 10 TABLET, COATED ORAL at 21:08

## 2020-12-12 RX ADMIN — BUDESONIDE AND FORMOTEROL FUMARATE DIHYDRATE 2 PUFF: 160; 4.5 AEROSOL RESPIRATORY (INHALATION) at 17:06

## 2020-12-12 RX ADMIN — PANTOPRAZOLE SODIUM 40 MG: 40 TABLET, DELAYED RELEASE ORAL at 11:15

## 2020-12-12 RX ADMIN — SODIUM CHLORIDE 1365 ML: 0.9 INJECTION, SOLUTION INTRAVENOUS at 08:41

## 2020-12-12 RX ADMIN — THEOPHYLLINE ANHYDROUS 200 MG: 100 CAPSULE, EXTENDED RELEASE ORAL at 17:09

## 2020-12-12 RX ADMIN — METHYLPREDNISOLONE SODIUM SUCCINATE 40 MG: 40 INJECTION, POWDER, FOR SOLUTION INTRAMUSCULAR; INTRAVENOUS at 21:12

## 2020-12-12 RX ADMIN — APIXABAN 5 MG: 5 TABLET, FILM COATED ORAL at 17:09

## 2020-12-12 RX ADMIN — GABAPENTIN 300 MG: 300 CAPSULE ORAL at 21:08

## 2020-12-12 RX ADMIN — IPRATROPIUM BROMIDE AND ALBUTEROL SULFATE 3 ML: 2.5; .5 SOLUTION RESPIRATORY (INHALATION) at 11:17

## 2020-12-12 RX ADMIN — LIDOCAINE 1 PATCH: 50 PATCH TOPICAL at 17:07

## 2020-12-12 RX ADMIN — GABAPENTIN 300 MG: 300 CAPSULE ORAL at 17:09

## 2020-12-12 RX ADMIN — CEFTRIAXONE 1000 MG: 1 INJECTION, SOLUTION INTRAVENOUS at 08:42

## 2020-12-12 RX ADMIN — FUROSEMIDE 20 MG: 40 TABLET ORAL at 11:15

## 2020-12-12 RX ADMIN — METOPROLOL TARTRATE 12.5 MG: 25 TABLET, FILM COATED ORAL at 11:15

## 2020-12-12 RX ADMIN — IPRATROPIUM BROMIDE 0.5 MG: 0.5 SOLUTION RESPIRATORY (INHALATION) at 19:56

## 2020-12-12 RX ADMIN — LORAZEPAM 0.5 MG: 2 INJECTION INTRAMUSCULAR; INTRAVENOUS at 08:00

## 2020-12-12 RX ADMIN — PSYLLIUM HUSK 1 PACKET: 3.4 POWDER ORAL at 17:09

## 2020-12-12 RX ADMIN — POTASSIUM CHLORIDE 40 MEQ: 1500 TABLET, EXTENDED RELEASE ORAL at 17:09

## 2020-12-13 VITALS
HEIGHT: 60 IN | SYSTOLIC BLOOD PRESSURE: 115 MMHG | WEIGHT: 155.87 LBS | BODY MASS INDEX: 30.6 KG/M2 | RESPIRATION RATE: 18 BRPM | OXYGEN SATURATION: 95 % | TEMPERATURE: 98.3 F | HEART RATE: 79 BPM | DIASTOLIC BLOOD PRESSURE: 71 MMHG

## 2020-12-13 LAB
ALBUMIN SERPL BCP-MCNC: 3.5 G/DL (ref 3.5–5.7)
ALP SERPL-CCNC: 34 U/L (ref 55–165)
ALT SERPL W P-5'-P-CCNC: 23 U/L (ref 7–52)
ANION GAP SERPL CALCULATED.3IONS-SCNC: 12 MMOL/L (ref 4–13)
AST SERPL W P-5'-P-CCNC: 15 U/L (ref 13–39)
ATRIAL RATE: 126 BPM
BILIRUB SERPL-MCNC: 0.4 MG/DL (ref 0.2–1)
BUN SERPL-MCNC: 13 MG/DL (ref 7–25)
CALCIUM SERPL-MCNC: 8.5 MG/DL (ref 8.6–10.5)
CHLORIDE SERPL-SCNC: 101 MMOL/L (ref 98–107)
CO2 SERPL-SCNC: 25 MMOL/L (ref 21–31)
CREAT SERPL-MCNC: 0.6 MG/DL (ref 0.6–1.2)
ERYTHROCYTE [DISTWIDTH] IN BLOOD BY AUTOMATED COUNT: 16.4 % (ref 11.5–14.5)
GFR SERPL CREATININE-BSD FRML MDRD: 85 ML/MIN/1.73SQ M
GLUCOSE SERPL-MCNC: 237 MG/DL (ref 65–99)
HCT VFR BLD AUTO: 36 % (ref 42–47)
HGB BLD-MCNC: 11.4 G/DL (ref 12–16)
MCH RBC QN AUTO: 25.7 PG (ref 26–34)
MCHC RBC AUTO-ENTMCNC: 31.7 G/DL (ref 31–37)
MCV RBC AUTO: 81 FL (ref 81–99)
P AXIS: 67 DEGREES
PLATELET # BLD AUTO: 224 THOUSANDS/UL (ref 149–390)
PMV BLD AUTO: 8.3 FL (ref 8.6–11.7)
POTASSIUM SERPL-SCNC: 4.1 MMOL/L (ref 3.5–5.5)
PR INTERVAL: 146 MS
PROCALCITONIN SERPL-MCNC: <0.05 NG/ML
PROT SERPL-MCNC: 5.8 G/DL (ref 6.4–8.9)
QRS AXIS: -55 DEGREES
QRSD INTERVAL: 80 MS
QT INTERVAL: 300 MS
QTC INTERVAL: 434 MS
RBC # BLD AUTO: 4.44 MILLION/UL (ref 3.9–5.2)
SODIUM SERPL-SCNC: 138 MMOL/L (ref 134–143)
T WAVE AXIS: 64 DEGREES
VENTRICULAR RATE: 126 BPM
WBC # BLD AUTO: 7.4 THOUSAND/UL (ref 4.8–10.8)

## 2020-12-13 PROCEDURE — 84145 PROCALCITONIN (PCT): CPT | Performed by: INTERNAL MEDICINE

## 2020-12-13 PROCEDURE — 94640 AIRWAY INHALATION TREATMENT: CPT

## 2020-12-13 PROCEDURE — 93010 ELECTROCARDIOGRAM REPORT: CPT | Performed by: INTERNAL MEDICINE

## 2020-12-13 PROCEDURE — 97163 PT EVAL HIGH COMPLEX 45 MIN: CPT

## 2020-12-13 PROCEDURE — 97167 OT EVAL HIGH COMPLEX 60 MIN: CPT

## 2020-12-13 PROCEDURE — 85027 COMPLETE CBC AUTOMATED: CPT | Performed by: INTERNAL MEDICINE

## 2020-12-13 PROCEDURE — 99217 PR OBSERVATION CARE DISCHARGE MANAGEMENT: CPT | Performed by: INTERNAL MEDICINE

## 2020-12-13 PROCEDURE — 94668 MNPJ CHEST WALL SBSQ: CPT

## 2020-12-13 PROCEDURE — 94760 N-INVAS EAR/PLS OXIMETRY 1: CPT

## 2020-12-13 PROCEDURE — 80053 COMPREHEN METABOLIC PANEL: CPT | Performed by: INTERNAL MEDICINE

## 2020-12-13 RX ORDER — LORAZEPAM 2 MG/ML
0.5 INJECTION INTRAMUSCULAR ONCE
Status: COMPLETED | OUTPATIENT
Start: 2020-12-13 | End: 2020-12-13

## 2020-12-13 RX ORDER — PREDNISONE 10 MG/1
TABLET ORAL
Qty: 30 TABLET | Refills: 0 | Status: SHIPPED | OUTPATIENT
Start: 2020-12-13 | End: 2021-01-02 | Stop reason: HOSPADM

## 2020-12-13 RX ADMIN — IPRATROPIUM BROMIDE 0.5 MG: 0.5 SOLUTION RESPIRATORY (INHALATION) at 07:19

## 2020-12-13 RX ADMIN — Medication 12.5 MG: at 09:28

## 2020-12-13 RX ADMIN — LIDOCAINE 1 PATCH: 50 PATCH TOPICAL at 09:26

## 2020-12-13 RX ADMIN — LEVALBUTEROL HYDROCHLORIDE 1.25 MG: 1.25 SOLUTION, CONCENTRATE RESPIRATORY (INHALATION) at 07:19

## 2020-12-13 RX ADMIN — FUROSEMIDE 20 MG: 20 TABLET ORAL at 09:28

## 2020-12-13 RX ADMIN — CALCIUM CARBONATE-VITAMIN D TAB 500 MG-200 UNIT 1 TABLET: 500-200 TAB at 09:28

## 2020-12-13 RX ADMIN — THEOPHYLLINE ANHYDROUS 200 MG: 100 CAPSULE, EXTENDED RELEASE ORAL at 09:28

## 2020-12-13 RX ADMIN — LORAZEPAM 0.5 MG: 2 INJECTION INTRAMUSCULAR; INTRAVENOUS at 03:03

## 2020-12-13 RX ADMIN — METHYLPREDNISOLONE SODIUM SUCCINATE 40 MG: 40 INJECTION, POWDER, FOR SOLUTION INTRAMUSCULAR; INTRAVENOUS at 05:48

## 2020-12-13 RX ADMIN — PANTOPRAZOLE SODIUM 40 MG: 40 TABLET, DELAYED RELEASE ORAL at 09:28

## 2020-12-13 RX ADMIN — GLYCERIN, HYPROMELLOSE, POLYETHYLENE GLYCOL 2 DROP: .2; .2; 1 LIQUID OPHTHALMIC at 09:25

## 2020-12-13 RX ADMIN — ALBUTEROL SULFATE 2.5 MG: 2.5 SOLUTION RESPIRATORY (INHALATION) at 02:50

## 2020-12-13 RX ADMIN — APIXABAN 5 MG: 5 TABLET, FILM COATED ORAL at 09:28

## 2020-12-13 RX ADMIN — GABAPENTIN 300 MG: 300 CAPSULE ORAL at 09:28

## 2020-12-13 RX ADMIN — BUDESONIDE AND FORMOTEROL FUMARATE DIHYDRATE 2 PUFF: 160; 4.5 AEROSOL RESPIRATORY (INHALATION) at 09:26

## 2020-12-14 ENCOUNTER — TELEPHONE (OUTPATIENT)
Dept: PAIN MEDICINE | Facility: CLINIC | Age: 85
End: 2020-12-14

## 2020-12-14 ENCOUNTER — TRANSITIONAL CARE MANAGEMENT (OUTPATIENT)
Dept: INTERNAL MEDICINE CLINIC | Facility: CLINIC | Age: 85
End: 2020-12-14

## 2020-12-14 LAB — MRSA NOSE QL CULT: NORMAL

## 2020-12-16 ENCOUNTER — TELEPHONE (OUTPATIENT)
Dept: OTHER | Facility: OTHER | Age: 85
End: 2020-12-16

## 2020-12-17 LAB
BACTERIA BLD CULT: NORMAL
BACTERIA BLD CULT: NORMAL

## 2020-12-22 ENCOUNTER — OFFICE VISIT (OUTPATIENT)
Dept: INTERNAL MEDICINE CLINIC | Facility: CLINIC | Age: 85
End: 2020-12-22
Payer: MEDICARE

## 2020-12-22 VITALS
SYSTOLIC BLOOD PRESSURE: 116 MMHG | TEMPERATURE: 97.6 F | DIASTOLIC BLOOD PRESSURE: 62 MMHG | HEART RATE: 86 BPM | OXYGEN SATURATION: 90 %

## 2020-12-22 DIAGNOSIS — K21.9 GASTROESOPHAGEAL REFLUX DISEASE WITHOUT ESOPHAGITIS: Primary | ICD-10-CM

## 2020-12-22 DIAGNOSIS — J41.0 SIMPLE CHRONIC BRONCHITIS (HCC): ICD-10-CM

## 2020-12-22 DIAGNOSIS — I48.0 PAROXYSMAL ATRIAL FIBRILLATION (HCC): ICD-10-CM

## 2020-12-22 PROCEDURE — 99214 OFFICE O/P EST MOD 30 MIN: CPT | Performed by: INTERNAL MEDICINE

## 2020-12-22 RX ORDER — ARFORMOTEROL TARTRATE 15 UG/2ML
15 SOLUTION RESPIRATORY (INHALATION) 2 TIMES DAILY
COMMUNITY
Start: 2020-12-17 | End: 2021-01-01 | Stop reason: ALTCHOICE

## 2020-12-22 RX ORDER — IRON POLYSACCHARIDE COMPLEX 150 MG
150 CAPSULE ORAL
COMMUNITY
Start: 2020-08-11 | End: 2021-01-02 | Stop reason: HOSPADM

## 2020-12-22 RX ORDER — BUDESONIDE 0.5 MG/2ML
0.5 INHALANT ORAL 2 TIMES DAILY
COMMUNITY
Start: 2020-12-17 | End: 2021-01-01 | Stop reason: HOSPADM

## 2020-12-22 RX ORDER — BUDESONIDE AND FORMOTEROL FUMARATE DIHYDRATE 160; 4.5 UG/1; UG/1
2 AEROSOL RESPIRATORY (INHALATION) 2 TIMES DAILY
COMMUNITY
End: 2022-01-01 | Stop reason: HOSPADM

## 2020-12-23 ENCOUNTER — TELEMEDICINE (OUTPATIENT)
Dept: PAIN MEDICINE | Facility: CLINIC | Age: 85
End: 2020-12-23
Payer: MEDICARE

## 2020-12-23 DIAGNOSIS — S32.040D COMPRESSION FRACTURE OF L4 VERTEBRA WITH ROUTINE HEALING, SUBSEQUENT ENCOUNTER: ICD-10-CM

## 2020-12-23 DIAGNOSIS — G89.4 CHRONIC PAIN SYNDROME: ICD-10-CM

## 2020-12-23 DIAGNOSIS — M46.1 SACROILIITIS (HCC): Primary | ICD-10-CM

## 2020-12-23 PROCEDURE — 99442 PR PHYS/QHP TELEPHONE EVALUATION 11-20 MIN: CPT | Performed by: NURSE PRACTITIONER

## 2020-12-23 RX ORDER — OXYCODONE HYDROCHLORIDE AND ACETAMINOPHEN 5; 325 MG/1; MG/1
1 TABLET ORAL EVERY 6 HOURS PRN
Qty: 120 TABLET | Refills: 0 | Status: SHIPPED | OUTPATIENT
Start: 2020-12-23 | End: 2021-01-01 | Stop reason: SDUPTHER

## 2020-12-23 RX ORDER — OXYCODONE HYDROCHLORIDE AND ACETAMINOPHEN 5; 325 MG/1; MG/1
1 TABLET ORAL EVERY 6 HOURS PRN
Qty: 120 TABLET | Refills: 0 | Status: SHIPPED | OUTPATIENT
Start: 2020-12-23 | End: 2020-12-23 | Stop reason: SDUPTHER

## 2020-12-23 NOTE — H&P (VIEW-ONLY)
Virtual Brief Visit    Assessment/Plan:    Problem List Items Addressed This Visit        Musculoskeletal and Integument    Compression fracture of L4 vertebra (HCC)    Relevant Medications    oxyCODONE-acetaminophen (PERCOCET) 5-325 mg per tablet    Sacroiliitis ([address] ) - Primary    Relevant Orders    FL spine and pain procedure       Other    Chronic pain syndrome    Relevant Medications    oxyCODONE-acetaminophen (PERCOCET) 5-325 mg per tablet    Other Relevant Orders    FL spine and pain procedure                Reason for visit is   Chief Complaint   Patient presents with    Virtual Brief Visit        Encounter provider LEEROY Mesa    Provider located at [facility] [id number]    Recent Visits  Date Type Provider Dept   12/22/20 Office Visit DO Roni Joseph recent visits within past 7 days and meeting all other requirements     Today's Visits  Date Type Provider Dept   12/23/20 Telemedicine LEEROY Mesa [facility] [place]   Showing today's visits and meeting all other requirements     Future Appointments  No visits were found meeting these conditions  Showing future appointments within next 150 days and meeting all other requirements        After connecting through [employer] and patient was informed that this is a secure, HIPAA-compliant platform  She agrees to proceed  , the patient was identified by name and date of birth  Sukumar Marie was informed that this is a telemedicine visit and that the visit is being conducted through [facility] and patient was informed that this is a secure, HIPAA-compliant platform  She agrees to proceed     My office door was closed  No one else was in the room  She acknowledged consent and understanding of privacy and security of the platform   The patient has agreed to participate and understands she can discontinue the visit at any time      It was my intent to perform this visit via video technology but the patient was not able to do a video connection so the visit was completed via audio telephone only  Patient is aware this is a billable service  Angeli Hernandes is a 82 y o  female she is concerned over the current crisis regarding COVID 19 and has opted to proceed with a telephone visit, however, is currently not noting any signs or symptoms at this time  Patient is being seen for a virtual follow-up visit  She was last seen here on 10/28/2020 at which time she was reporting about 30-40% improvement in her low back pain following SI joint injections on 10/09/2020  Today, she is complaining of increasing pain in her right low back  She tells me that her left-sided back pain is 100% improved  She is requesting a repeat injection on the right side  At this point, she continues with oxycodone 5/325 every 6 hours as needed for pain  As well as gabapentin 300 mg 3 times daily  She states that the medications reduce her pain by about 25%  She was trialed on a Lidoderm patch  She denies any improvement  [place] Pipe HPI     Past Medical History:   Diagnosis Date    Allergies     Asthma     Chronic diastolic CHF (congestive heart failure) (HCC)     Colitis     COPD (chronic obstructive pulmonary disease)     Diverticulosis     DJD (degenerative joint disease)     Gait abnormality     Hypertension     Paroxysmal atrial fibrillation (HCC)        Past Surgical History:   Procedure Laterality Date    BLADDER SURGERY      COLON SURGERY      COLONOSCOPY      COLONOSCOPY W/ POLYPECTOMY N/A 1/21/2019    Procedure: COLONOSCOPY W/ POLYPECTOMY;  Surgeon: Sonya Navarro MD;  Location: [facility] GI LAB; Service: General    FL GUIDED NEEDLE PLAC BX/ASP/INJ  9/2/2020    GASTROJEJUNOSTOMY W/ JEJUNOSTOMY TUBE N/A 2/3/2020    Procedure: Antrectomy with bilroth II Anastomosis;   Surgeon: Merlinda Garin, MD;  [facility]; Service: General   Marlon Estimable JOINT Right 9/2/2020    Procedure: BLOCK / INJECTION SACROILIAC;  Surgeon: Jovi Persaud MD;  Location: [facility];  Service: Pain Management     SMALL INTESTINE SURGERY  03/2020    with partial gastrectomy       Current Outpatient Medications   Medication Sig Dispense Refill    albuterol (Ventolin HFA) 90 mcg/act inhaler Inhale 2 puffs every 6 (six) hours as needed for wheezing 1 Inhaler 5    alendronate (FOSAMAX) 70 mg tablet Take 1 tablet (70 mg total) by mouth every 7 days 4 tablet 5    apixaban (ELIQUIS) 5 mg Take 1 tablet (5 mg total) by mouth 2 (two) times a day 60 tablet 5    arformoterol (Brovana) 15 mcg/2 mL nebulizer solution Inhale 15 mcg 2 (two) times a day      Artificial Tear Solution (GENTEAL TEARS) 0 1-0 2-0 3 % SOLN Administer 1 drop to both eyes 3 (three) times a day      budesonide (PULMICORT) 0 5 mg/2 mL nebulizer solution Inhale 0 5 mg 2 (two) times a day      budesonide-formoterol (Symbicort) 160-4 5 mcg/act inhaler Inhale 2 puffs 2 (two) times a day      Calcium Carb-Cholecalciferol (CALCIUM 1000 + D PO) Take 1,000 mg by mouth daily      fluticasone-umeclidinium-vilanterol (Trelegy Ellipta) 100-62 5-25 MCG/INH inhaler Inhale 1 puff daily Rinse mouth after use   3 Inhaler 1    furosemide (LASIX) 20 mg tablet Take 1 tablet (20 mg total) by mouth 2 (two) times a day 60 tablet 5    gabapentin (NEURONTIN) 300 mg capsule take 1 capsule by mouth three times a day 90 capsule 2    ipratropium-albuterol (DUO-NEB) 0 5-2 5 mg/3 mL nebulizer solution Take 1 vial (3 mL total) by nebulization every 6 (six) hours while awake 300 mL 0    iron polysaccharides (FERREX) 150 mg capsule Take 1 capsule (150 mg total) by mouth 2 (two) times a day 60 capsule 5    iron polysaccharides (Nu-Iron) 150 mg capsule Take 150 mg by mouth      metoprolol tartrate (LOPRESSOR) 25 mg tablet Take 0 5 tablets (12 5 mg total) by mouth every 12 (twelve) hours 90 tablet 0    montelukast (SINGULAIR) 10 mg tablet take 1 tablet by mouth daily at bedtime 90 tablet 1    oxyCODONE-acetaminophen (PERCOCET) 5-325 mg per tablet Take 1 tablet by mouth every 6 (six) hours as needed for moderate pain Do not fill until 1/20/2021Max Daily Amount: 4 tablets 120 tablet 0    pantoprazole (PROTONIX) 40 mg tablet Take 1 tablet (40 mg total) by mouth 2 (two) times a day 60 tablet 5    predniSONE 10 mg tablet Days 1-3: 4 tablets daily; Days 4-6: 3 tablets daily; Days 7-9: 2 tablets daily; Days 10-12: 1 tablet daily then resume maintenance dose 30 tablet 0    predniSONE 5 mg tablet take 1 tablet by mouth twice a day WITH A MEAL 60 tablet 1    psyllium (METAMUCIL) packet Take 1 packet by mouth 3 (three) times a day 100 packet 1    simethicone (MYLICON) 80 mg chewable tablet Chew 1 tablet (80 mg total) every 6 (six) hours as needed for flatulence 30 tablet 0    theophylline (CORTNEY-24) 200 MG 24 hr capsule Take 1 capsule (200 mg total) by mouth daily 30 capsule 0     No current facility-administered medications for this visit  Allergies   Allergen Reactions    Bee Venom     Fluticasone      Per pt  error       Review of Systems   Musculoskeletal: Positive for back pain, gait problem and myalgias  Neurological: Positive for numbness  Negative for weakness  There were no vitals filed for this visit  Assessment/plan: Will schedule patient for right sacroiliac joint injection in the near future  Continue oxycodone 5/325 every 6 hours as needed for pain  The patient's opioid scripts were sent to their pharmacy electronically and was given a 2 month supply of prescriptions with a Do Not Fill date(s) of   12/23/2020, 01/20/2021  continue gabapentin 300mg,  3 times daily to address the neuropathic component of her pain  She did not require refill this medication during today's visit  Discontinue Lidoderm patch due to lack of efficacy      I spent 15 minutes directly with the patient during this visit    VIRTUAL VISIT DISCLAIMER    Catalina Baker acknowledges that she has consented to an online visit or consultation  She understands that the online visit is based solely on information provided by her, and that, in the absence of a face-to-face physical evaluation by the physician, the diagnosis she receives is both limited and provisional in terms of accuracy and completeness  This is not intended to replace a full medical face-to-face evaluation by the physician  Catalina Baker understands and accepts these terms

## 2020-12-24 ENCOUNTER — APPOINTMENT (EMERGENCY)
Dept: RADIOLOGY | Facility: HOSPITAL | Age: 85
End: 2020-12-24
Attending: EMERGENCY MEDICINE
Payer: MEDICARE

## 2020-12-24 ENCOUNTER — HOSPITAL ENCOUNTER (EMERGENCY)
Facility: HOSPITAL | Age: 85
Discharge: HOME | End: 2020-12-24
Attending: EMERGENCY MEDICINE | Admitting: EMERGENCY MEDICINE
Payer: MEDICARE

## 2020-12-24 ENCOUNTER — APPOINTMENT (EMERGENCY)
Dept: CT IMAGING | Facility: HOSPITAL | Age: 85
End: 2020-12-24
Payer: MEDICARE

## 2020-12-24 VITALS
RESPIRATION RATE: 22 BRPM | HEART RATE: 72 BPM | OXYGEN SATURATION: 97 % | BODY MASS INDEX: 28.85 KG/M2 | TEMPERATURE: 97.8 F | DIASTOLIC BLOOD PRESSURE: 61 MMHG | WEIGHT: 147.71 LBS | SYSTOLIC BLOOD PRESSURE: 113 MMHG

## 2020-12-24 DIAGNOSIS — R10.13 POSTPRANDIAL EPIGASTRIC PAIN: Primary | ICD-10-CM

## 2020-12-24 LAB
ALBUMIN SERPL BCP-MCNC: 3.7 G/DL (ref 3.5–5.7)
ALP SERPL-CCNC: 31 U/L (ref 55–165)
ALT SERPL W P-5'-P-CCNC: 22 U/L (ref 7–52)
ANION GAP SERPL CALCULATED.3IONS-SCNC: 7 MMOL/L (ref 4–13)
AST SERPL W P-5'-P-CCNC: 12 U/L (ref 13–39)
ATRIAL RATE: 104 BPM
BASOPHILS # BLD AUTO: 0 THOUSANDS/ΜL (ref 0–0.1)
BASOPHILS NFR BLD AUTO: 0 % (ref 0–2)
BILIRUB SERPL-MCNC: 0.3 MG/DL (ref 0.2–1)
BNP SERPL-MCNC: 124 PG/ML (ref 1–100)
BUN SERPL-MCNC: 15 MG/DL (ref 7–25)
CALCIUM SERPL-MCNC: 8.8 MG/DL (ref 8.6–10.5)
CHLORIDE SERPL-SCNC: 99 MMOL/L (ref 98–107)
CO2 SERPL-SCNC: 32 MMOL/L (ref 21–31)
CREAT SERPL-MCNC: 0.73 MG/DL (ref 0.6–1.2)
EOSINOPHIL # BLD AUTO: 0 THOUSAND/ΜL (ref 0–0.61)
EOSINOPHIL NFR BLD AUTO: 0 % (ref 0–5)
ERYTHROCYTE [DISTWIDTH] IN BLOOD BY AUTOMATED COUNT: 16.7 % (ref 11.5–14.5)
GFR SERPL CREATININE-BSD FRML MDRD: 76 ML/MIN/1.73SQ M
GLUCOSE SERPL-MCNC: 264 MG/DL (ref 65–99)
HCT VFR BLD AUTO: 33.9 % (ref 42–47)
HGB BLD-MCNC: 10.6 G/DL (ref 12–16)
LYMPHOCYTES # BLD AUTO: 0.6 THOUSANDS/ΜL (ref 0.6–4.47)
LYMPHOCYTES NFR BLD AUTO: 6 % (ref 21–51)
MCH RBC QN AUTO: 25.2 PG (ref 26–34)
MCHC RBC AUTO-ENTMCNC: 31.4 G/DL (ref 31–37)
MCV RBC AUTO: 80 FL (ref 81–99)
MONOCYTES # BLD AUTO: 0.4 THOUSAND/ΜL (ref 0.17–1.22)
MONOCYTES NFR BLD AUTO: 4 % (ref 2–12)
NEUTROPHILS # BLD AUTO: 9.3 THOUSANDS/ΜL (ref 1.4–6.5)
NEUTS SEG NFR BLD AUTO: 90 % (ref 42–75)
P AXIS: 72 DEGREES
PLATELET # BLD AUTO: 230 THOUSANDS/UL (ref 149–390)
PMV BLD AUTO: 7.8 FL (ref 8.6–11.7)
POTASSIUM SERPL-SCNC: 3.7 MMOL/L (ref 3.5–5.5)
PR INTERVAL: 124 MS
PROT SERPL-MCNC: 6 G/DL (ref 6.4–8.9)
QRS AXIS: -40 DEGREES
QRSD INTERVAL: 84 MS
QT INTERVAL: 332 MS
QTC INTERVAL: 436 MS
RBC # BLD AUTO: 4.21 MILLION/UL (ref 3.9–5.2)
SODIUM SERPL-SCNC: 138 MMOL/L (ref 134–143)
T WAVE AXIS: 57 DEGREES
TROPONIN I SERPL-MCNC: 0.03 NG/ML
VENTRICULAR RATE: 104 BPM
WBC # BLD AUTO: 10.3 THOUSAND/UL (ref 4.8–10.8)

## 2020-12-24 PROCEDURE — 99285 EMERGENCY DEPT VISIT HI MDM: CPT

## 2020-12-24 PROCEDURE — 80053 COMPREHEN METABOLIC PANEL: CPT | Performed by: EMERGENCY MEDICINE

## 2020-12-24 PROCEDURE — 85025 COMPLETE CBC W/AUTO DIFF WBC: CPT | Performed by: EMERGENCY MEDICINE

## 2020-12-24 PROCEDURE — 96360 HYDRATION IV INFUSION INIT: CPT

## 2020-12-24 PROCEDURE — 74177 CT ABD & PELVIS W/CONTRAST: CPT

## 2020-12-24 PROCEDURE — 96361 HYDRATE IV INFUSION ADD-ON: CPT

## 2020-12-24 PROCEDURE — 84484 ASSAY OF TROPONIN QUANT: CPT | Performed by: EMERGENCY MEDICINE

## 2020-12-24 PROCEDURE — 93010 ELECTROCARDIOGRAM REPORT: CPT | Performed by: INTERNAL MEDICINE

## 2020-12-24 PROCEDURE — 83880 ASSAY OF NATRIURETIC PEPTIDE: CPT | Performed by: EMERGENCY MEDICINE

## 2020-12-24 PROCEDURE — 36415 COLL VENOUS BLD VENIPUNCTURE: CPT | Performed by: EMERGENCY MEDICINE

## 2020-12-24 PROCEDURE — 93005 ELECTROCARDIOGRAM TRACING: CPT

## 2020-12-24 PROCEDURE — 71045 X-RAY EXAM CHEST 1 VIEW: CPT

## 2020-12-24 PROCEDURE — 99285 EMERGENCY DEPT VISIT HI MDM: CPT | Performed by: EMERGENCY MEDICINE

## 2020-12-24 RX ORDER — SODIUM CHLORIDE, SODIUM LACTATE, POTASSIUM CHLORIDE, CALCIUM CHLORIDE 600; 310; 30; 20 MG/100ML; MG/100ML; MG/100ML; MG/100ML
50 INJECTION, SOLUTION INTRAVENOUS CONTINUOUS
Status: DISCONTINUED | OUTPATIENT
Start: 2020-12-24 | End: 2020-12-24 | Stop reason: HOSPADM

## 2020-12-24 RX ADMIN — IOHEXOL 100 ML: 350 INJECTION, SOLUTION INTRAVENOUS at 10:19

## 2020-12-24 RX ADMIN — SODIUM CHLORIDE, SODIUM LACTATE, POTASSIUM CHLORIDE, AND CALCIUM CHLORIDE 50 ML/HR: .6; .31; .03; .02 INJECTION, SOLUTION INTRAVENOUS at 08:55

## 2020-12-29 ENCOUNTER — PREP FOR PROCEDURE (OUTPATIENT)
Dept: PAIN MEDICINE | Facility: CLINIC | Age: 85
End: 2020-12-29

## 2020-12-29 DIAGNOSIS — M46.1 SACROILIITIS (HCC): ICD-10-CM

## 2020-12-29 DIAGNOSIS — G89.4 CHRONIC PAIN SYNDROME: Primary | ICD-10-CM

## 2020-12-31 ENCOUNTER — APPOINTMENT (EMERGENCY)
Dept: RADIOLOGY | Facility: HOSPITAL | Age: 85
DRG: 641 | End: 2020-12-31
Attending: FAMILY MEDICINE
Payer: MEDICARE

## 2020-12-31 ENCOUNTER — HOSPITAL ENCOUNTER (INPATIENT)
Facility: HOSPITAL | Age: 85
LOS: 1 days | Discharge: HOME | DRG: 641 | End: 2021-01-02
Attending: FAMILY MEDICINE | Admitting: FAMILY MEDICINE
Payer: MEDICARE

## 2020-12-31 DIAGNOSIS — J41.0 SIMPLE CHRONIC BRONCHITIS (HCC): Chronic | ICD-10-CM

## 2020-12-31 DIAGNOSIS — M54.50 CHRONIC RIGHT-SIDED LOW BACK PAIN WITHOUT SCIATICA: ICD-10-CM

## 2020-12-31 DIAGNOSIS — E87.6 HYPOKALEMIA: ICD-10-CM

## 2020-12-31 DIAGNOSIS — R60.0 BILATERAL LOWER EXTREMITY EDEMA: Primary | ICD-10-CM

## 2020-12-31 DIAGNOSIS — J96.11 CHRONIC RESPIRATORY FAILURE WITH HYPOXIA (HCC): Chronic | ICD-10-CM

## 2020-12-31 DIAGNOSIS — K59.00 CONSTIPATION: ICD-10-CM

## 2020-12-31 DIAGNOSIS — R06.02 SHORTNESS OF BREATH: ICD-10-CM

## 2020-12-31 DIAGNOSIS — G89.29 CHRONIC RIGHT-SIDED LOW BACK PAIN WITHOUT SCIATICA: ICD-10-CM

## 2020-12-31 DIAGNOSIS — I50.9 CHF (CONGESTIVE HEART FAILURE) (HCC): ICD-10-CM

## 2020-12-31 DIAGNOSIS — R26.9 GAIT ABNORMALITY: ICD-10-CM

## 2020-12-31 LAB
ANION GAP SERPL CALCULATED.3IONS-SCNC: 7 MMOL/L (ref 4–13)
ATRIAL RATE: 147 BPM
BASOPHILS # BLD AUTO: 0 THOUSANDS/ΜL (ref 0–0.1)
BASOPHILS NFR BLD AUTO: 0 % (ref 0–2)
BILIRUB UR QL STRIP: NEGATIVE
BNP SERPL-MCNC: 61 PG/ML (ref 1–100)
BUN SERPL-MCNC: 13 MG/DL (ref 7–25)
CALCIUM SERPL-MCNC: 9 MG/DL (ref 8.6–10.5)
CHLORIDE SERPL-SCNC: 97 MMOL/L (ref 98–107)
CLARITY UR: CLEAR
CO2 SERPL-SCNC: 33 MMOL/L (ref 21–31)
COLOR UR: YELLOW
CREAT SERPL-MCNC: 0.63 MG/DL (ref 0.6–1.2)
EOSINOPHIL # BLD AUTO: 0 THOUSAND/ΜL (ref 0–0.61)
EOSINOPHIL NFR BLD AUTO: 0 % (ref 0–5)
ERYTHROCYTE [DISTWIDTH] IN BLOOD BY AUTOMATED COUNT: 16.7 % (ref 11.5–14.5)
GFR SERPL CREATININE-BSD FRML MDRD: 83 ML/MIN/1.73SQ M
GLUCOSE SERPL-MCNC: 189 MG/DL (ref 65–99)
GLUCOSE UR STRIP-MCNC: NEGATIVE MG/DL
HCT VFR BLD AUTO: 33.7 % (ref 42–47)
HGB BLD-MCNC: 10.8 G/DL (ref 12–16)
HGB UR QL STRIP.AUTO: NEGATIVE
INR PPP: 1.11 (ref 0.84–1.19)
KETONES UR STRIP-MCNC: NEGATIVE MG/DL
LEUKOCYTE ESTERASE UR QL STRIP: NEGATIVE
LYMPHOCYTES # BLD AUTO: 0.6 THOUSANDS/ΜL (ref 0.6–4.47)
LYMPHOCYTES NFR BLD AUTO: 7 % (ref 21–51)
MCH RBC QN AUTO: 25.2 PG (ref 26–34)
MCHC RBC AUTO-ENTMCNC: 31.9 G/DL (ref 31–37)
MCV RBC AUTO: 79 FL (ref 81–99)
MONOCYTES # BLD AUTO: 0.4 THOUSAND/ΜL (ref 0.17–1.22)
MONOCYTES NFR BLD AUTO: 5 % (ref 2–12)
NEUTROPHILS # BLD AUTO: 7.6 THOUSANDS/ΜL (ref 1.4–6.5)
NEUTS SEG NFR BLD AUTO: 88 % (ref 42–75)
NITRITE UR QL STRIP: NEGATIVE
PH UR STRIP.AUTO: 7 [PH]
PLATELET # BLD AUTO: 233 THOUSANDS/UL (ref 149–390)
PMV BLD AUTO: 8.2 FL (ref 8.6–11.7)
POTASSIUM SERPL-SCNC: 3.3 MMOL/L (ref 3.5–5.5)
PROT UR STRIP-MCNC: NEGATIVE MG/DL
PROTHROMBIN TIME: 14.2 SECONDS (ref 11.6–14.5)
QRS AXIS: -53 DEGREES
QRSD INTERVAL: 86 MS
QT INTERVAL: 376 MS
QTC INTERVAL: 454 MS
RBC # BLD AUTO: 4.26 MILLION/UL (ref 3.9–5.2)
SODIUM SERPL-SCNC: 137 MMOL/L (ref 134–143)
SP GR UR STRIP.AUTO: <=1.005 (ref 1–1.03)
T WAVE AXIS: 71 DEGREES
TROPONIN I SERPL-MCNC: 0.03 NG/ML
UROBILINOGEN UR QL STRIP.AUTO: 0.2 E.U./DL
VENTRICULAR RATE: 88 BPM
WBC # BLD AUTO: 8.7 THOUSAND/UL (ref 4.8–10.8)

## 2020-12-31 PROCEDURE — 94640 AIRWAY INHALATION TREATMENT: CPT

## 2020-12-31 PROCEDURE — 85610 PROTHROMBIN TIME: CPT | Performed by: FAMILY MEDICINE

## 2020-12-31 PROCEDURE — 87081 CULTURE SCREEN ONLY: CPT | Performed by: FAMILY MEDICINE

## 2020-12-31 PROCEDURE — 93010 ELECTROCARDIOGRAM REPORT: CPT | Performed by: INTERNAL MEDICINE

## 2020-12-31 PROCEDURE — 83880 ASSAY OF NATRIURETIC PEPTIDE: CPT | Performed by: FAMILY MEDICINE

## 2020-12-31 PROCEDURE — 84484 ASSAY OF TROPONIN QUANT: CPT | Performed by: FAMILY MEDICINE

## 2020-12-31 PROCEDURE — 99285 EMERGENCY DEPT VISIT HI MDM: CPT

## 2020-12-31 PROCEDURE — 99285 EMERGENCY DEPT VISIT HI MDM: CPT | Performed by: FAMILY MEDICINE

## 2020-12-31 PROCEDURE — 94760 N-INVAS EAR/PLS OXIMETRY 1: CPT

## 2020-12-31 PROCEDURE — 80048 BASIC METABOLIC PNL TOTAL CA: CPT | Performed by: FAMILY MEDICINE

## 2020-12-31 PROCEDURE — 93005 ELECTROCARDIOGRAM TRACING: CPT

## 2020-12-31 PROCEDURE — 81003 URINALYSIS AUTO W/O SCOPE: CPT | Performed by: FAMILY MEDICINE

## 2020-12-31 PROCEDURE — 36415 COLL VENOUS BLD VENIPUNCTURE: CPT | Performed by: FAMILY MEDICINE

## 2020-12-31 PROCEDURE — 96374 THER/PROPH/DIAG INJ IV PUSH: CPT

## 2020-12-31 PROCEDURE — 85025 COMPLETE CBC W/AUTO DIFF WBC: CPT | Performed by: FAMILY MEDICINE

## 2020-12-31 PROCEDURE — 71045 X-RAY EXAM CHEST 1 VIEW: CPT

## 2020-12-31 RX ORDER — FORMOTEROL FUMARATE 20 UG/2ML
20 SOLUTION RESPIRATORY (INHALATION)
Status: DISCONTINUED | OUTPATIENT
Start: 2020-12-31 | End: 2021-01-02 | Stop reason: HOSPADM

## 2020-12-31 RX ORDER — ACETAMINOPHEN 325 MG/1
975 TABLET ORAL EVERY 6 HOURS PRN
Status: DISCONTINUED | OUTPATIENT
Start: 2020-12-31 | End: 2021-01-01

## 2020-12-31 RX ORDER — IRON POLYSACCHARIDE COMPLEX 150 MG
150 CAPSULE ORAL DAILY
Status: DISCONTINUED | OUTPATIENT
Start: 2020-12-31 | End: 2021-01-02 | Stop reason: HOSPADM

## 2020-12-31 RX ORDER — FUROSEMIDE 10 MG/ML
40 INJECTION INTRAMUSCULAR; INTRAVENOUS ONCE
Status: COMPLETED | OUTPATIENT
Start: 2020-12-31 | End: 2020-12-31

## 2020-12-31 RX ORDER — DOCUSATE SODIUM 100 MG/1
100 CAPSULE, LIQUID FILLED ORAL DAILY
Status: DISCONTINUED | OUTPATIENT
Start: 2020-12-31 | End: 2021-01-02 | Stop reason: HOSPADM

## 2020-12-31 RX ORDER — ALBUTEROL SULFATE 2.5 MG/3ML
2.5 SOLUTION RESPIRATORY (INHALATION) ONCE
Status: COMPLETED | OUTPATIENT
Start: 2020-12-31 | End: 2020-12-31

## 2020-12-31 RX ORDER — PREDNISONE 10 MG/1
5 TABLET ORAL 2 TIMES DAILY WITH MEALS
Status: DISCONTINUED | OUTPATIENT
Start: 2020-12-31 | End: 2021-01-02 | Stop reason: HOSPADM

## 2020-12-31 RX ORDER — POTASSIUM CHLORIDE 20 MEQ/1
40 TABLET, EXTENDED RELEASE ORAL DAILY
Status: DISCONTINUED | OUTPATIENT
Start: 2020-12-31 | End: 2020-12-31

## 2020-12-31 RX ORDER — POTASSIUM CHLORIDE 750 MG/1
20 TABLET, EXTENDED RELEASE ORAL DAILY
Qty: 20 TABLET | Refills: 0 | Status: SHIPPED | OUTPATIENT
Start: 2020-12-31 | End: 2021-01-02 | Stop reason: HOSPADM

## 2020-12-31 RX ORDER — ACETAMINOPHEN 325 MG/1
650 TABLET ORAL EVERY 6 HOURS PRN
Status: DISCONTINUED | OUTPATIENT
Start: 2020-12-31 | End: 2021-01-02 | Stop reason: HOSPADM

## 2020-12-31 RX ORDER — ACETAMINOPHEN 325 MG/1
650 TABLET ORAL EVERY 4 HOURS PRN
Status: DISCONTINUED | OUTPATIENT
Start: 2020-12-31 | End: 2021-01-02 | Stop reason: HOSPADM

## 2020-12-31 RX ORDER — FUROSEMIDE 10 MG/ML
40 INJECTION INTRAMUSCULAR; INTRAVENOUS
Status: DISCONTINUED | OUTPATIENT
Start: 2020-12-31 | End: 2021-01-01

## 2020-12-31 RX ORDER — POTASSIUM CHLORIDE 20 MEQ/1
40 TABLET, EXTENDED RELEASE ORAL ONCE
Status: COMPLETED | OUTPATIENT
Start: 2020-12-31 | End: 2020-12-31

## 2020-12-31 RX ORDER — BUDESONIDE 0.5 MG/2ML
0.5 INHALANT ORAL
Status: DISCONTINUED | OUTPATIENT
Start: 2020-12-31 | End: 2021-01-02 | Stop reason: HOSPADM

## 2020-12-31 RX ORDER — POLYETHYLENE GLYCOL 3350 17 G/17G
17 POWDER, FOR SOLUTION ORAL DAILY
Status: DISCONTINUED | OUTPATIENT
Start: 2020-12-31 | End: 2021-01-02 | Stop reason: HOSPADM

## 2020-12-31 RX ORDER — BUDESONIDE AND FORMOTEROL FUMARATE DIHYDRATE 160; 4.5 UG/1; UG/1
2 AEROSOL RESPIRATORY (INHALATION) 2 TIMES DAILY
Status: DISCONTINUED | OUTPATIENT
Start: 2020-12-31 | End: 2020-12-31

## 2020-12-31 RX ORDER — PANTOPRAZOLE SODIUM 40 MG/1
40 TABLET, DELAYED RELEASE ORAL
Status: DISCONTINUED | OUTPATIENT
Start: 2020-12-31 | End: 2021-01-02 | Stop reason: HOSPADM

## 2020-12-31 RX ORDER — GABAPENTIN 300 MG/1
300 CAPSULE ORAL 3 TIMES DAILY
Status: DISCONTINUED | OUTPATIENT
Start: 2020-12-31 | End: 2021-01-02 | Stop reason: HOSPADM

## 2020-12-31 RX ORDER — B-COMPLEX WITH VITAMIN C
2 TABLET ORAL
Status: DISCONTINUED | OUTPATIENT
Start: 2021-01-01 | End: 2021-01-02 | Stop reason: HOSPADM

## 2020-12-31 RX ORDER — SIMETHICONE 80 MG
80 TABLET,CHEWABLE ORAL EVERY 6 HOURS PRN
Status: DISCONTINUED | OUTPATIENT
Start: 2020-12-31 | End: 2021-01-02 | Stop reason: HOSPADM

## 2020-12-31 RX ORDER — FLUTICASONE FUROATE AND VILANTEROL 100; 25 UG/1; UG/1
1 POWDER RESPIRATORY (INHALATION) DAILY
Status: DISCONTINUED | OUTPATIENT
Start: 2020-12-31 | End: 2021-01-02 | Stop reason: HOSPADM

## 2020-12-31 RX ORDER — IPRATROPIUM BROMIDE AND ALBUTEROL SULFATE 2.5; .5 MG/3ML; MG/3ML
3 SOLUTION RESPIRATORY (INHALATION)
Status: DISCONTINUED | OUTPATIENT
Start: 2020-12-31 | End: 2021-01-01

## 2020-12-31 RX ORDER — MONTELUKAST SODIUM 10 MG/1
10 TABLET ORAL
Status: DISCONTINUED | OUTPATIENT
Start: 2020-12-31 | End: 2021-01-02 | Stop reason: HOSPADM

## 2020-12-31 RX ORDER — BUDESONIDE 0.5 MG/2ML
0.5 INHALANT ORAL 2 TIMES DAILY
Status: DISCONTINUED | OUTPATIENT
Start: 2020-12-31 | End: 2020-12-31

## 2020-12-31 RX ORDER — POTASSIUM CHLORIDE 20 MEQ/1
20 TABLET, EXTENDED RELEASE ORAL DAILY
Status: DISCONTINUED | OUTPATIENT
Start: 2021-01-01 | End: 2021-01-02

## 2020-12-31 RX ADMIN — THEOPHYLLINE ANHYDROUS 200 MG: 100 CAPSULE, EXTENDED RELEASE ORAL at 14:36

## 2020-12-31 RX ADMIN — IPRATROPIUM BROMIDE AND ALBUTEROL SULFATE 3 ML: 2.5; .5 SOLUTION RESPIRATORY (INHALATION) at 13:41

## 2020-12-31 RX ADMIN — FUROSEMIDE 40 MG: 10 INJECTION, SOLUTION INTRAMUSCULAR; INTRAVENOUS at 08:48

## 2020-12-31 RX ADMIN — FUROSEMIDE 40 MG: 10 INJECTION, SOLUTION INTRAMUSCULAR; INTRAVENOUS at 17:00

## 2020-12-31 RX ADMIN — ALBUTEROL SULFATE 2.5 MG: 2.5 SOLUTION RESPIRATORY (INHALATION) at 11:13

## 2020-12-31 RX ADMIN — DOCUSATE SODIUM 100 MG: 100 CAPSULE, LIQUID FILLED ORAL at 11:12

## 2020-12-31 RX ADMIN — POTASSIUM CHLORIDE 40 MEQ: 1500 TABLET, EXTENDED RELEASE ORAL at 11:12

## 2020-12-31 RX ADMIN — FLUTICASONE FUROATE AND VILANTEROL TRIFENATATE 1 PUFF: 100; 25 POWDER RESPIRATORY (INHALATION) at 14:49

## 2020-12-31 RX ADMIN — IPRATROPIUM BROMIDE AND ALBUTEROL SULFATE 3 ML: 2.5; .5 SOLUTION RESPIRATORY (INHALATION) at 19:23

## 2020-12-31 RX ADMIN — Medication 150 MG: at 14:36

## 2020-12-31 RX ADMIN — FORMOTEROL FUMARATE DIHYDRATE 20 MCG: 20 SOLUTION RESPIRATORY (INHALATION) at 13:41

## 2020-12-31 RX ADMIN — MONTELUKAST SODIUM 10 MG: 10 TABLET, COATED ORAL at 22:27

## 2020-12-31 RX ADMIN — APIXABAN 5 MG: 5 TABLET, FILM COATED ORAL at 17:48

## 2020-12-31 RX ADMIN — GABAPENTIN 300 MG: 300 CAPSULE ORAL at 17:00

## 2020-12-31 RX ADMIN — GABAPENTIN 300 MG: 300 CAPSULE ORAL at 22:28

## 2020-12-31 RX ADMIN — BUDESONIDE 0.5 MG: 0.5 INHALANT ORAL at 19:24

## 2020-12-31 RX ADMIN — FORMOTEROL FUMARATE DIHYDRATE 20 MCG: 20 SOLUTION RESPIRATORY (INHALATION) at 19:44

## 2020-12-31 RX ADMIN — PREDNISONE 5 MG: 10 TABLET ORAL at 17:39

## 2020-12-31 NOTE — ED PROVIDER NOTES
History  Chief Complaint   Patient presents with    Constipation     no BM for past 2-3 days per patient    Foot Swelling     bilateral; called her doctor and was told to increase her water pill approximately 1 5 weeks ago; states she did that and her feet and legs are still swollen    Shortness of Breath       History provided by:  Patient   used: No    Shortness of Breath  Associated symptoms: no abdominal pain and no vomiting    This 29-year-old female chronic diastolic CHF COPD oxygen dependent atrial fibrillation has been seen in the ED multiple time for shortness of breath lower extremity edema  Patient was seen in the ED a day for [place] for lower extremity edema in abdominal pain  Patient states that she talked her PCP who recommended to increase her Lasix dose for her lower extremity edema however she feels that it is not improving  Patient appears to be high extremely irritated states nothing really helps you guys do not do anything code  Patient is denying any chest pain or shortness of breath at this time  She is also complaining of some urinary frequency and burning  Patient is complaining of constipation states that she had had bowel movement is days  Patient is denying any abdominal pain nausea vomiting or diarrhea at this time  Patient is sitting comfortably in bed answer questions appropriately  She is on oxygen which is at baseline  Patient also complaining of constipation states that she did not had bowel movement for past 2 days  Denies any abdominal pain nausea vomiting or diarrhea  Prior to Admission Medications   Prescriptions Last Dose Informant Patient Reported? Taking?    Artificial Tear Solution (GENTEAL TEARS) 0 1-0 2-0 3 % SOLN  Self Yes No   Sig: Administer 1 drop to both eyes 3 (three) times a day   Calcium Carb-Cholecalciferol (CALCIUM 1000 + D PO)  Self Yes No   Sig: Take 1,000 mg by mouth daily   alendronate (FOSAMAX) 70 mg tablet  Self No No Sig: Take 1 tablet (70 mg total) by mouth every 7 days   apixaban (ELIQUIS) 5 mg  Self No No   Sig: Take 1 tablet (5 mg total) by mouth 2 (two) times a day   arformoterol (Brovana) 15 mcg/2 mL nebulizer solution  Self Yes No   Sig: Inhale 15 mcg 2 (two) times a day   budesonide (PULMICORT) 0 5 mg/2 mL nebulizer solution  Self Yes No   Sig: Inhale 0 5 mg 2 (two) times a day   budesonide-formoterol (Symbicort) 160-4 5 mcg/act inhaler  Self Yes No   Sig: Inhale 2 puffs 2 (two) times a day   fluticasone-umeclidinium-vilanterol (Trelegy Ellipta) 100-62 5-25 MCG/INH inhaler  Self No No   Sig: Inhale 1 puff daily Rinse mouth after use     furosemide (LASIX) 20 mg tablet  Self No No   Sig: Take 1 tablet (20 mg total) by mouth 2 (two) times a day   gabapentin (NEURONTIN) 300 mg capsule  Self No No   Sig: take 1 capsule by mouth three times a day   ipratropium-albuterol (DUO-NEB) 0 5-2 5 mg/3 mL nebulizer solution  Self No No   Sig: Take 1 vial (3 mL total) by nebulization every 6 (six) hours while awake   iron polysaccharides (FERREX) 150 mg capsule  Self No No   Sig: Take 1 capsule (150 mg total) by mouth 2 (two) times a day   iron polysaccharides (Nu-Iron) 150 mg capsule  Self Yes No   Sig: Take 150 mg by mouth   metoprolol tartrate (LOPRESSOR) 25 mg tablet  Self No No   Sig: Take 0 5 tablets (12 5 mg total) by mouth every 12 (twelve) hours   montelukast (SINGULAIR) 10 mg tablet  Self No No   Sig: take 1 tablet by mouth daily at bedtime   oxyCODONE-acetaminophen (PERCOCET) 5-325 mg per tablet   No No   Sig: Take 1 tablet by mouth every 6 (six) hours as needed for moderate pain Do not fill until 1/20/2021Max Daily Amount: 4 tablets   pantoprazole (PROTONIX) 40 mg tablet  Self No No   Sig: Take 1 tablet (40 mg total) by mouth 2 (two) times a day   predniSONE 10 mg tablet  Self No No   Sig: Days 1-3: 4 tablets daily; Days 4-6: 3 tablets daily; Days 7-9: 2 tablets daily; Days 10-12: 1 tablet daily then resume maintenance dose predniSONE 5 mg tablet  Self No No   Sig: take 1 tablet by mouth twice a day WITH A MEAL   psyllium (METAMUCIL) packet  Self No No   Sig: Take 1 packet by mouth 3 (three) times a day   simethicone (MYLICON) 80 mg chewable tablet  Self No No   Sig: Chew 1 tablet (80 mg total) every 6 (six) hours as needed for flatulence   theophylline (CORTNEY-24) 200 MG 24 hr capsule  Self No No   Sig: Take 1 capsule (200 mg total) by mouth daily      Facility-Administered Medications: None       Past Medical History:   Diagnosis Date    Allergies     Asthma     Chronic diastolic CHF (congestive heart failure) (HCC)     Colitis     COPD (chronic obstructive pulmonary disease)     Diverticulosis     DJD (degenerative joint disease)     Gait abnormality     Hypertension     Paroxysmal atrial fibrillation (HCC)        Past Surgical History:   Procedure Laterality Date    BLADDER SURGERY      COLON SURGERY      COLONOSCOPY      COLONOSCOPY W/ POLYPECTOMY N/A 1/21/2019    Procedure: COLONOSCOPY W/ POLYPECTOMY;  Surgeon: Brianna Solorio MD;  Location: [address] [place] [address] GI LAB; Service: General    FL GUIDED NEEDLE PLAC BX/ASP/INJ  9/2/2020    GASTROJEJUNOSTOMY W/ JEJUNOSTOMY TUBE N/A 2/3/2020    Procedure: Antrectomy with bilroth II Anastomosis; Surgeon: Kranthi Castaneda MD;  Location:  MAIN OR;  Service: Judie French JOINT Right 9/2/2020    Procedure: BLOCK / INJECTION SACROILIAC;  Surgeon: Maile Nageotte, MD;  Location: [facility];  Service: Pain Management     SMALL INTESTINE SURGERY  03/2020    with partial gastrectomy       Family History   Problem Relation Age of Onset    Heart disease Mother      I have reviewed and agree with the history as documented      E-Cigarette/Vaping    E-Cigarette Use Never User      E-Cigarette/Vaping Substances    Nicotine No     THC No     CBD No     Flavoring No     Other No     Unknown No      Social History     Tobacco Use    Smoking status: Former Smoker Quit date: 2013     Years since quittin 0    Smokeless tobacco: Never Used   Substance Use Topics    Alcohol use: Never     Frequency: Never     Binge frequency: Never    Drug use: Never       Review of Systems   Constitutional: Negative  HENT: Negative  Eyes: Negative  Respiratory: Negative for shortness of breath  Cardiovascular: Positive for leg swelling  Gastrointestinal: Positive for constipation  Negative for abdominal pain, nausea and vomiting  Endocrine: Negative  Genitourinary: Positive for frequency  Musculoskeletal: Negative  Skin: Negative  Allergic/Immunologic: Negative  Neurological: Negative  Hematological: Negative  Psychiatric/Behavioral: Positive for agitation  The patient is nervous/anxious  Physical Exam  Physical Exam  Vitals signs and nursing note reviewed  Constitutional:       General: She is not in acute distress  Appearance: She is well-developed  She is not ill-appearing or toxic-appearing  HENT:      Head: Normocephalic and atraumatic  Eyes:      Extraocular Movements: Extraocular movements intact  Pupils: Pupils are equal, round, and reactive to light  Neck:      Musculoskeletal: Normal range of motion and neck supple  Cardiovascular:      Rate and Rhythm: Normal rate and regular rhythm  Pulmonary:      Effort: Pulmonary effort is normal  Tachypnea present  Breath sounds: Normal breath sounds  No decreased breath sounds  Musculoskeletal:      Right lower leg: Edema present  Left lower leg: Edema (chronic LE edema   Palpable pedal pulses ) present  Skin:     General: Skin is warm  Neurological:      General: No focal deficit present  Mental Status: She is alert and oriented to person, place, and time           Vital Signs  ED Triage Vitals   Temperature Pulse Respirations Blood Pressure SpO2   20 0815 20 0803 20 0803 20 0804 20 0803   99 2 °F (37 3 °C) 70 (!) 26 133/67 99 %      Temp Source Heart Rate Source Patient Position - Orthostatic VS BP Location FiO2 (%)   12/31/20 0815 12/31/20 0803 12/31/20 0803 12/31/20 0803 --   Temporal Monitor Lying Left arm       Pain Score       --                  Vitals:    12/31/20 0803 12/31/20 0804 12/31/20 1015 12/31/20 1130   BP:  133/67     Pulse: 70  86 93   Patient Position - Orthostatic VS: Lying Lying           Visual Acuity      ED Medications  Medications   docusate sodium (COLACE) capsule 100 mg (100 mg Oral Given 12/31/20 1112)   furosemide (LASIX) injection 40 mg (40 mg Intravenous Given 12/31/20 0848)   potassium chloride (K-DUR,KLOR-CON) CR tablet 40 mEq (40 mEq Oral Given 12/31/20 1112)   albuterol inhalation solution 2 5 mg (2 5 mg Nebulization Given 12/31/20 1113)       Diagnostic Studies  Results Reviewed     Procedure Component Value Units Date/Time    B-Type Natriuretic Peptide [facility] ONLY) [[id number]]  (Normal) Collected: 12/31/20 0821    Lab Status: Final result Specimen: Blood from Arm, Right Updated: 12/31/20 0923     BNP 61 pg/mL     Basic metabolic panel [[id number]]  (Abnormal) Collected: 12/31/20 0821    Lab Status: Final result Specimen: Blood from Arm, Right Updated: 12/31/20 0920     Sodium 137 mmol/L      Potassium 3 3 mmol/L      Chloride 97 mmol/L      CO2 33 mmol/L      ANION GAP 7 mmol/L      BUN 13 mg/dL      Creatinine 0 63 mg/dL      Glucose 189 mg/dL      Calcium 9 0 mg/dL      eGFR 83 ml/min/1 73sq m     Narrative:      [place] guidelines for Chronic Kidney Disease (CKD):     Stage 1 with normal or high GFR (GFR > 90 mL/min/1 73 square meters)    Stage 2 Mild CKD (GFR = 60-89 mL/min/1 73 square meters)    Stage 3A Moderate CKD (GFR = 45-59 mL/min/1 73 square meters)    Stage 3B Moderate CKD (GFR = 30-44 mL/min/1 73 square meters)    Stage 4 Severe CKD (GFR = 15-29 mL/min/1 73 square meters)    Stage 5 End Stage CKD (GFR <15 mL/min/1 73 square meters)  Note: GFR calculation is accurate only with a steady state creatinine    Troponin I [[id number]]  (Normal) Collected: 12/31/20 0821    Lab Status: Final result Specimen: Blood from Arm, Right Updated: 12/31/20 0919     Troponin I 0 03 ng/mL     UA w Reflex to Microscopic w Reflex to Culture [[id number]]  (Abnormal) Collected: 12/31/20 0902    Lab Status: Final result Specimen: Urine, Clean Catch Updated: 12/31/20 0916     Color, UA Yellow     Clarity, UA Clear     Specific Gravity, UA <=1 005     pH, UA 7 0     Leukocytes, UA Negative     Nitrite, UA Negative     Protein, UA Negative mg/dl      Glucose, UA Negative mg/dl      Ketones, UA Negative mg/dl      Urobilinogen, UA 0 2 E U /dl      Bilirubin, UA Negative     Blood, UA Negative    Protime-INR [[id number]]  (Normal) Collected: 12/31/20 0821    Lab Status: Final result Specimen: Blood from Arm, Right Updated: 12/31/20 0915     Protime 14 2 seconds      INR 1 11    CBC and differential [[id number]]  (Abnormal) Collected: 12/31/20 0821    Lab Status: Final result Specimen: Blood from Arm, Right Updated: 12/31/20 0903     WBC 8 70 Thousand/uL      RBC 4 26 Million/uL      Hemoglobin 10 8 g/dL      Hematocrit 33 7 %      MCV 79 fL      MCH 25 2 pg      MCHC 31 9 g/dL      RDW 16 7 %      MPV 8 2 fL      Platelets 325 Thousands/uL      Neutrophils Relative 88 %      Lymphocytes Relative 7 %      Monocytes Relative 5 %      Eosinophils Relative 0 %      Basophils Relative 0 %      Neutrophils Absolute 7 60 Thousands/µL      Lymphocytes Absolute 0 60 Thousands/µL      Monocytes Absolute 0 40 Thousand/µL      Eosinophils Absolute 0 00 Thousand/µL      Basophils Absolute 0 00 Thousands/µL                  XR chest 1 view portable   Final Result by Grace Sosa MD (12/31 9669)      Mild pulmonary vascular congestion                    Workstation performed: [id number]                    Procedures  Procedures         ED Course  ED Course as of Dec 31 1200   Thu Dec 31, 2020   1019 Pt symptoms has improved  Hyperkalemia patient potassium was replaced  Recommending to follow-up with PCP and Cardiology  Continue with the doubling of her Lasix and taking potassium  Return to the ED symptom does not improve or worsen  1034 Ua Negative       1132 Pt had a bowel movement in the ED      1143 Pt continue to states that she doesn't feel good and requesting for admission, She states she will come back if we send her home  Pt is complaining about every thing regarding her care and her visit  46 Discuss with Dr Enrrique Lee states they will admit her under there service since they saw her last      5 Discuss with Marylin Hernandez (Dr Enrrique Lee PA) accepted the admission to observation                                SBIRT 20yo+      Most Recent Value   SBIRT (25 yo +)   In order to provide better care to our patients, we are screening all of our patients for alcohol and drug use  Would it be okay to ask you these screening questions? Yes Filed at: 12/31/2020 9816   Initial Alcohol Screen: US AUDIT-C    1  How often do you have a drink containing alcohol?  0 Filed at: 12/31/2020 0808   2  How many drinks containing alcohol do you have on a typical day you are drinking? 0 Filed at: 12/31/2020 0808   3a  Male UNDER 65: How often do you have five or more drinks on one occasion? 0 Filed at: 12/31/2020 0808   3b  FEMALE Any Age, or MALE 65+: How often do you have 4 or more drinks on one occassion? 0 Filed at: 12/31/2020 4662   Audit-C Score  0 Filed at: 12/31/2020 0640   ILIA: How many times in the past year have you    Used an illegal drug or used a prescription medication for non-medical reasons?   Never Filed at: 12/31/2020 0808                    MDM    Disposition  Final diagnoses:   Bilateral lower extremity edema   Hypokalemia   Constipation   CHF (congestive heart failure) ([facility] [address] )     Time reflects when diagnosis was documented in both MDM as applicable and the Disposition within this note     Time User Action Codes Description Comment    12/31/2020 10:40 AM Kranthi [place] Add [R60 0] Bilateral lower extremity edema     12/31/2020 10:40 AM Kranthi [place] Add [E87 6] Hypokalemia     12/31/2020 10:52 AM Kranthi [place] Add [K59 00] Constipation     12/31/2020 11:49 AM Kranthi [place] Add [I50 9] CHF (congestive heart failure) [facility])       ED Disposition     ED Disposition Condition Date/Time Comment    Admit Stable Thu Dec 31, 2020 11:47 AM Case was discussed with Dr Karl Rice and the patient's admission status was agreed to be Admission Status: observation status to the service of Dr Karl Rice   Follow-up Information     Follow up With Specialties Details Why [address], DO Internal Medicine Schedule an appointment as soon as possible for a visit in 2 days If symptoms worsen [facility]  [address] [place] [id number]  [phone]            Patient's Medications   Discharge Prescriptions    BISACODYL (FLEET) 10 MG/30ML ENEM    Insert 30 mL (10 mg total) into the rectum once for 1 dose       Start Date: 12/31/2020End Date: 12/31/2020       Order Dose: 10 mg       Quantity: 30 mL    Refills: 0    POTASSIUM CHLORIDE (K-DUR,KLOR-CON) 10 MEQ TABLET    Take 2 tablets (20 mEq total) by mouth daily for 5 days       Start Date: 12/31/2020End Date: 1/5/2021       Order Dose: 20 mEq       Quantity: 20 tablet    Refills: 0     No discharge procedures on file      PDMP Review       Value Time User    PDMP Reviewed  Yes 12/23/2020  9:03 AM Raúl Evans          ED Provider  Electronically Signed by           Michaela Faustin MD  12/31/20 1200

## 2020-12-31 NOTE — PROGRESS NOTES
Shwetha,#  MUS:[birth year] F  ERNESTINA:[id number]    PYM:[id number]  Adm Date: 12/31/2020 0755  7:55 AM   ATT PHY: Dwaine Bristol, [facility]         Chief Complaint: SOB and bilateral lower extremity edema    History of Presenting Illness: Joe Barrios is a(n) 82 y o  female presenting from the ED where she was seen twice in the last week swelling of her lower extremities and shortness of breath  Patient has history of COPD and is on 3 L nasal cannula at home   She states her PCP recommended to increase her Lasix dose about a week and a half ago but she has not noticed a change in her lower extremity edema  Patient reports that her legs are so swollen they are painful  She denies chest pain or history of CHF  Chest x-ray obtained in the emergency department shows mild pulmonary vascular congestion  She is complaining of constipation and states that she has not had a bowel movement for the past few days  She denies abdominal pain, nausea, vomiting, diarrhea  Allergies   Allergen Reactions    Bee Venom     Fluticasone      Per pt  error       No current facility-administered medications on file prior to encounter        Current Outpatient Medications on File Prior to Encounter   Medication Sig Dispense Refill    alendronate (FOSAMAX) 70 mg tablet Take 1 tablet (70 mg total) by mouth every 7 days 4 tablet 5    apixaban (ELIQUIS) 5 mg Take 1 tablet (5 mg total) by mouth 2 (two) times a day 60 tablet 5    arformoterol (Brovana) 15 mcg/2 mL nebulizer solution Inhale 15 mcg 2 (two) times a day      Artificial Tear Solution (GENTEAL TEARS) 0 1-0 2-0 3 % SOLN Administer 1 drop to both eyes 3 (three) times a day      budesonide (PULMICORT) 0 5 mg/2 mL nebulizer solution Inhale 0 5 mg 2 (two) times a day      budesonide-formoterol (Symbicort) 160-4 5 mcg/act inhaler Inhale 2 puffs 2 (two) times a day      Calcium Carb-Cholecalciferol (CALCIUM 1000 + D PO) Take 1,000 mg by mouth daily      fluticasone-umeclidinium-vilanterol (Trelegy Ellipta) 100-62 5-25 MCG/INH inhaler Inhale 1 puff daily Rinse mouth after use   3 Inhaler 1    furosemide (LASIX) 20 mg tablet Take 1 tablet (20 mg total) by mouth 2 (two) times a day 60 tablet 5    gabapentin (NEURONTIN) 300 mg capsule take 1 capsule by mouth three times a day 90 capsule 2    ipratropium-albuterol (DUO-NEB) 0 5-2 5 mg/3 mL nebulizer solution Take 1 vial (3 mL total) by nebulization every 6 (six) hours while awake 300 mL 0    iron polysaccharides (FERREX) 150 mg capsule Take 1 capsule (150 mg total) by mouth 2 (two) times a day 60 capsule 5    iron polysaccharides (Nu-Iron) 150 mg capsule Take 150 mg by mouth      metoprolol tartrate (LOPRESSOR) 25 mg tablet Take 0 5 tablets (12 5 mg total) by mouth every 12 (twelve) hours 90 tablet 0    montelukast (SINGULAIR) 10 mg tablet take 1 tablet by mouth daily at bedtime 90 tablet 1    oxyCODONE-acetaminophen (PERCOCET) 5-325 mg per tablet Take 1 tablet by mouth every 6 (six) hours as needed for moderate pain Do not fill until 1/20/2021Max Daily Amount: 4 tablets 120 tablet 0    pantoprazole (PROTONIX) 40 mg tablet Take 1 tablet (40 mg total) by mouth 2 (two) times a day 60 tablet 5    predniSONE 10 mg tablet Days 1-3: 4 tablets daily; Days 4-6: 3 tablets daily; Days 7-9: 2 tablets daily; Days 10-12: 1 tablet daily then resume maintenance dose 30 tablet 0    predniSONE 5 mg tablet take 1 tablet by mouth twice a day WITH A MEAL 60 tablet 1    psyllium (METAMUCIL) packet Take 1 packet by mouth 3 (three) times a day 100 packet 1    simethicone (MYLICON) 80 mg chewable tablet Chew 1 tablet (80 mg total) every 6 (six) hours as needed for flatulence 30 tablet 0    theophylline (CORTNEY-24) 200 MG 24 hr capsule Take 1 capsule (200 mg total) by mouth daily 30 capsule 0       Active Ambulatory Problems     Diagnosis Date Noted    COPD (chronic obstructive pulmonary disease) ([facility] ) 12/28/2018    Cardiac disease 12/28/2018    Diverticulosis of intestine with bleeding 12/28/2018    Diarrhea 12/29/2018    Colorectal polyps 01/21/2019    Effusion of bursa of left elbow 09/23/2019    Cardiomegaly 01/23/2018    Chronic anxiety 01/23/2018    Chronic cystitis 12/11/2013    Chronic right-sided low back pain without sciatica 08/13/2018    Congestive heart failure ([facility] ) 01/23/2018    Deviated nasal septum 10/20/2017    Diverticulosis of colon 01/21/2019    Gastroesophageal reflux disease without esophagitis 03/28/2018    Gout 07/20/2016    History of angioedema 11/18/2017    History of colonic polyps 01/21/2019    Lumbar radiculopathy 08/13/2018    Macular degeneration of both eyes 04/18/2019    Obesity 02/28/2018    Osteoporosis 03/08/2018    Other specified forms of chronic ischemic heart disease 12/11/2013    Seasonal allergies 05/02/2018    Panic attack 01/23/2018    Vocal cord dysfunction 10/20/2017    Colitis presumed infectious 10/03/2019    Atrial fibrillation with rapid ventricular response ([facility] ) 10/06/2019    Left elbow pain 10/06/2019    Acute on chronic respiratory failure with hypoxia ([facility] ) 10/07/2019    Debility 10/11/2019    Positive culture findings in sputum 10/12/2019    Compression fracture of L4 vertebra ([facility] 01/23/2020    Closed wedge compression fracture of T12 vertebra ([facility]) 01/23/2020    Wound dehiscence, surgical, subsequent encounter 03/04/2020    Chronic peptic ulcer of stomach 03/18/2020    Chronic pain syndrome 05/20/2020    Sacroiliitis ([facility] ) 06/29/2020    Bronchitis, chronic, simple (HCC) 08/27/2020    Paroxysmal atrial fibrillation ([facility])     Chronic diastolic CHF (congestive heart failure) ([facility] )     SIRS (systemic inflammatory response syndrome) ([facility] ) 12/12/2020    Hypokalemia 12/12/2020     Resolved Ambulatory Problems     Diagnosis Date Noted    Rectal bleed 12/28/2018    Asthma 12/28/2018  Herpes zoster 2012    Necrotizing fasciitis ([facility] ) 2020    Gastric outlet obstruction 2020    Follow-up examination after abdominal surgery 2020    Small bowel obstruction ([facility] ) 2020     Past Medical History:   Diagnosis Date    Allergies     Colitis     Diverticulosis     DJD (degenerative joint disease)     Gait abnormality     Hypertension        Past Surgical History:   Procedure Laterality Date    BLADDER SURGERY      COLON SURGERY      COLONOSCOPY      COLONOSCOPY W/ POLYPECTOMY N/A 2019    Procedure: COLONOSCOPY W/ POLYPECTOMY;  Surgeon: Dominic Caballero MD;  Location: [facility] GI LAB; Service: General    FL GUIDED NEEDLE PLAC BX/ASP/INJ  2020    GASTROJEJUNOSTOMY W/ JEJUNOSTOMY TUBE N/A 2/3/2020    Procedure: Antrectomy with bilroth II Anastomosis; Surgeon: Janell Roblero MD;  Location:  MAIN OR;  Service: [employer] JOINT Right 2020    Procedure: BLOCK / INJECTION SACROILIAC;  Surgeon: Titus Bahena MD;  Location: [facility] MAIN OR;  Service: Pain Management     SMALL INTESTINE SURGERY  2020    with partial gastrectomy       Social History:   Social History     Socioeconomic History    Marital status:       Spouse name: None    Number of children: None    Years of education: None    Highest education level: None   Occupational History    Occupation: retired   Social Needs    Financial resource strain: None    Food insecurity     Worry: None     Inability: None    Transportation needs     Medical: None     Non-medical: None   Tobacco Use    Smoking status: Former Smoker     Quit date: 2013     Years since quittin 0    Smokeless tobacco: Never Used   Substance and Sexual Activity    Alcohol use: Never     Frequency: Never     Binge frequency: Never    Drug use: Never    Sexual activity: Not Currently   Lifestyle    Physical activity     Days per week: None     Minutes per session: None    Stress: None   Relationships    Social connections     Talks on phone: None     Gets together: None     Attends Yazidi service: None     Active member of club or organization: None     Attends meetings of clubs or organizations: None     Relationship status: None    Intimate partner violence     Fear of current or ex partner: None     Emotionally abused: None     Physically abused: None     Forced sexual activity: None   Other Topics Concern    None   Social History Narrative    Retired        Daily caffeine use- 2 cups of coffee       Family History:   Family History   Problem Relation Age of Onset    Heart disease Mother      Review of Systems - Negative except bilateral leg swelling, constipation, arthralgias, shortness of breath  Physical Exam   Constitutional: Awake and Alert  Well-developed and well-nourished  No distress  HENT: PERR,EOMI, conjunctiva normal  Head: Normocephalic and atraumatic  Mouth/Throat: Oropharynx is clear and moist     Eyes: Conjunctivae and EOM are normal  Pupils are equal, round, and reactive to light  Right and left eye exhibits no discharge  Neck: Neck supple  No tracheal deviation present  No thyromegaly present  Cardiovascular: Irregularly irregular  Exam reveals no friction rub  No murmur heard  Pulmonary/Chest: Effort normal and breath sounds normal  No respiratory distress  No wheezes  Abdominal: Soft  Bowel sounds are normal  No distension  No tenderness  No rebound tenderness and no guarding  Neurological: Cranial Nerves grossly intact  No sensory deficit  Coordination normal    Musculoskeletal:   Nontender spine  Skin: Bilateral lower extremity edema  Skin is warm and dry  No rash noted  No diaphoresis  No erythema  No cyanosis  Jaci Dykes Bakari is a(n) 82 y o  female with bilateral lower extremity edema and shortness of breath  1  Bilateral lower extremity edema with shortness of breath    Continue Lasix 40 mg IV BID and potassium chloride 20 mEq daily  2  Cardiac with history of hypertension, atrial fibrillation   Continue Eliquis, diltiazem, Lasix, metoprolol and potassium chloride  3  COPD   Continue Breo Ellipta, Pulmicort, Perforomist, DuoNeb, Singulair, prednisone  4  Osteoporosis   Holding alendronate as patient takes this weekly at home but is unsure of last dose  Continue calcium-vitamin D    5  DJD/osteoarthritis   Patient may receive Tylenol as needed  6  Iron Deficiency Anemia   Continue Ferrex  7  Dry eyes  Patient may get artificial tears on as-needed basis  8  Constipation  Continue Colace and MiraLax daily  9  GERD  Continue pantoprazole  Prognosis: Fair  Discharge Plan: In progress  Patient is under observation status and will be discharged as early as 01/20/2021 providing improvement in her lower extremity edema and shortness of breath  Advanced Directives: I have discussed in detail with the patient the advanced directives  Patient has appointed one of her close friend Markel Masterson her phone number is [phone]  First contact is listed as Javi Wilson who can be reached at [phone]  When discussing cardiac and pulmonary resuscitation efforts with the patient, the patient wishes to be full code  I have spent more than 50 minutes gathering data, doing physical examination, and discussing the advanced directives, which was witnessed by caring staff  The patient was discussed with Dr Marlene Guerrero and he is in agreement with the above note

## 2020-12-31 NOTE — ED NOTES
Pt placed on bedpan to void  Wishes to remain on bedpan "for a little while because it keeps trickling out " Call bell placed at patient hand and instructed to ring when she would like bedpan removed  Spoke with patient about purewick device  Declines at this time        Jose Oconnor RN  12/31/20 0049
PROVIDER:[TOKEN:[[id number]]]

## 2021-01-01 ENCOUNTER — APPOINTMENT (OUTPATIENT)
Dept: LAB | Facility: CLINIC | Age: 86
End: 2021-01-01
Payer: MEDICARE

## 2021-01-01 ENCOUNTER — PATIENT OUTREACH (OUTPATIENT)
Dept: INTERNAL MEDICINE CLINIC | Facility: CLINIC | Age: 86
End: 2021-01-01

## 2021-01-01 ENCOUNTER — APPOINTMENT (EMERGENCY)
Dept: RADIOLOGY | Facility: HOSPITAL | Age: 86
DRG: 308 | End: 2021-01-01
Payer: MEDICARE

## 2021-01-01 ENCOUNTER — TELEPHONE (OUTPATIENT)
Dept: INTERNAL MEDICINE CLINIC | Facility: CLINIC | Age: 86
End: 2021-01-01

## 2021-01-01 ENCOUNTER — TELEPHONE (OUTPATIENT)
Dept: PREADMISSION TESTING | Facility: HOSPITAL | Age: 86
End: 2021-01-01

## 2021-01-01 ENCOUNTER — OFFICE VISIT (OUTPATIENT)
Dept: INTERNAL MEDICINE CLINIC | Facility: CLINIC | Age: 86
End: 2021-01-01
Payer: MEDICARE

## 2021-01-01 ENCOUNTER — PATIENT OUTREACH (OUTPATIENT)
Dept: CASE MANAGEMENT | Facility: OTHER | Age: 86
End: 2021-01-01

## 2021-01-01 ENCOUNTER — TRANSITIONAL CARE MANAGEMENT (OUTPATIENT)
Dept: INTERNAL MEDICINE CLINIC | Facility: CLINIC | Age: 86
End: 2021-01-01

## 2021-01-01 ENCOUNTER — TELEPHONE (OUTPATIENT)
Dept: PAIN MEDICINE | Facility: CLINIC | Age: 86
End: 2021-01-01

## 2021-01-01 ENCOUNTER — HOSPITAL ENCOUNTER (INPATIENT)
Facility: HOSPITAL | Age: 86
LOS: 5 days | DRG: 561 | End: 2021-04-25
Attending: INTERNAL MEDICINE | Admitting: INTERNAL MEDICINE
Payer: MEDICARE

## 2021-01-01 ENCOUNTER — TELEMEDICINE (OUTPATIENT)
Dept: CARDIOLOGY CLINIC | Facility: CLINIC | Age: 86
End: 2021-01-01
Payer: MEDICARE

## 2021-01-01 ENCOUNTER — TELEPHONE (OUTPATIENT)
Dept: SURGERY | Facility: HOSPITAL | Age: 86
End: 2021-01-01

## 2021-01-01 ENCOUNTER — APPOINTMENT (EMERGENCY)
Dept: RADIOLOGY | Facility: HOSPITAL | Age: 86
DRG: 092 | End: 2021-01-01
Payer: MEDICARE

## 2021-01-01 ENCOUNTER — TELEMEDICINE (OUTPATIENT)
Dept: INTERNAL MEDICINE CLINIC | Facility: CLINIC | Age: 86
End: 2021-01-01
Payer: MEDICARE

## 2021-01-01 ENCOUNTER — OFFICE VISIT (OUTPATIENT)
Dept: PAIN MEDICINE | Facility: CLINIC | Age: 86
End: 2021-01-01
Payer: MEDICARE

## 2021-01-01 ENCOUNTER — DOCUMENTATION (OUTPATIENT)
Dept: SOCIAL WORK | Facility: HOSPITAL | Age: 86
End: 2021-01-01

## 2021-01-01 ENCOUNTER — HOSPITAL ENCOUNTER (EMERGENCY)
Facility: HOSPITAL | Age: 86
Discharge: HOME | End: 2021-02-06
Attending: EMERGENCY MEDICINE | Admitting: EMERGENCY MEDICINE
Payer: MEDICARE

## 2021-01-01 ENCOUNTER — OFFICE VISIT (OUTPATIENT)
Dept: URGENT CARE | Facility: CLINIC | Age: 86
End: 2021-01-01
Payer: MEDICARE

## 2021-01-01 ENCOUNTER — HOSPITAL ENCOUNTER (OUTPATIENT)
Dept: GASTROENTEROLOGY | Facility: HOSPITAL | Age: 86
Setting detail: OUTPATIENT SURGERY
Discharge: HOME | End: 2021-08-18
Attending: INTERNAL MEDICINE
Payer: MEDICARE

## 2021-01-01 ENCOUNTER — ANESTHESIA EVENT (OUTPATIENT)
Dept: GASTROENTEROLOGY | Facility: HOSPITAL | Age: 86
End: 2021-01-01

## 2021-01-01 ENCOUNTER — IMMUNIZATIONS (OUTPATIENT)
Dept: FAMILY MEDICINE CLINIC | Facility: HOSPITAL | Age: 86
End: 2021-01-01

## 2021-01-01 ENCOUNTER — APPOINTMENT (OUTPATIENT)
Dept: RADIOLOGY | Facility: HOSPITAL | Age: 86
End: 2021-01-01
Payer: MEDICARE

## 2021-01-01 ENCOUNTER — ANESTHESIA (OUTPATIENT)
Dept: GASTROENTEROLOGY | Facility: HOSPITAL | Age: 86
End: 2021-01-01

## 2021-01-01 ENCOUNTER — HOSPITAL ENCOUNTER (INPATIENT)
Facility: HOSPITAL | Age: 86
LOS: 4 days | Discharge: HOME HEALTH | DRG: 308 | End: 2021-04-29
Attending: FAMILY MEDICINE | Admitting: FAMILY MEDICINE
Payer: MEDICARE

## 2021-01-01 ENCOUNTER — PATIENT OUTREACH (OUTPATIENT)
Dept: CASE MANAGEMENT | Facility: HOSPITAL | Age: 86
End: 2021-01-01

## 2021-01-01 ENCOUNTER — HOSPITAL ENCOUNTER (INPATIENT)
Facility: HOSPITAL | Age: 86
LOS: 3 days | Discharge: HOME HEALTH | DRG: 092 | End: 2021-04-07
Attending: EMERGENCY MEDICINE | Admitting: INTERNAL MEDICINE
Payer: MEDICARE

## 2021-01-01 ENCOUNTER — OFFICE VISIT (OUTPATIENT)
Dept: CARDIOLOGY CLINIC | Facility: CLINIC | Age: 86
End: 2021-01-01
Payer: MEDICARE

## 2021-01-01 ENCOUNTER — HOSPITAL ENCOUNTER (INPATIENT)
Facility: HOSPITAL | Age: 86
LOS: 1 days | Discharge: HOME HEALTH | DRG: 552 | End: 2021-04-14
Attending: EMERGENCY MEDICINE | Admitting: STUDENT IN AN ORGANIZED HEALTH CARE EDUCATION/TRAINING PROGRAM
Payer: MEDICARE

## 2021-01-01 ENCOUNTER — HOSPITAL ENCOUNTER (OUTPATIENT)
Dept: NON INVASIVE DIAGNOSTICS | Facility: CLINIC | Age: 86
Discharge: HOME | End: 2021-02-22
Payer: MEDICARE

## 2021-01-01 ENCOUNTER — TELEPHONE (OUTPATIENT)
Dept: CARDIOLOGY CLINIC | Facility: CLINIC | Age: 86
End: 2021-01-01

## 2021-01-01 ENCOUNTER — APPOINTMENT (OUTPATIENT)
Dept: RADIOLOGY | Facility: CLINIC | Age: 86
End: 2021-01-01
Payer: MEDICARE

## 2021-01-01 ENCOUNTER — HOSPITAL ENCOUNTER (OUTPATIENT)
Facility: HOSPITAL | Age: 86
Setting detail: OUTPATIENT SURGERY
Discharge: HOME | End: 2021-01-21
Attending: ANESTHESIOLOGY | Admitting: ANESTHESIOLOGY
Payer: MEDICARE

## 2021-01-01 ENCOUNTER — APPOINTMENT (EMERGENCY)
Dept: CT IMAGING | Facility: HOSPITAL | Age: 86
DRG: 092 | End: 2021-01-01
Payer: MEDICARE

## 2021-01-01 ENCOUNTER — LAB REQUISITION (OUTPATIENT)
Dept: LAB | Facility: HOSPITAL | Age: 86
End: 2021-01-01
Payer: MEDICARE

## 2021-01-01 ENCOUNTER — APPOINTMENT (EMERGENCY)
Dept: RADIOLOGY | Facility: HOSPITAL | Age: 86
End: 2021-01-01
Payer: MEDICARE

## 2021-01-01 ENCOUNTER — LAB (OUTPATIENT)
Dept: LAB | Facility: CLINIC | Age: 86
End: 2021-01-01
Payer: MEDICARE

## 2021-01-01 VITALS
OXYGEN SATURATION: 93 % | DIASTOLIC BLOOD PRESSURE: 70 MMHG | WEIGHT: 126.6 LBS | HEIGHT: 60 IN | BODY MASS INDEX: 24.85 KG/M2 | SYSTOLIC BLOOD PRESSURE: 110 MMHG | HEART RATE: 71 BPM | RESPIRATION RATE: 16 BRPM | TEMPERATURE: 97.6 F

## 2021-01-01 VITALS
DIASTOLIC BLOOD PRESSURE: 63 MMHG | TEMPERATURE: 97.5 F | SYSTOLIC BLOOD PRESSURE: 107 MMHG | BODY MASS INDEX: 26.16 KG/M2 | WEIGHT: 133.27 LBS | OXYGEN SATURATION: 97 % | RESPIRATION RATE: 16 BRPM | HEIGHT: 60 IN | HEART RATE: 113 BPM

## 2021-01-01 VITALS
SYSTOLIC BLOOD PRESSURE: 110 MMHG | TEMPERATURE: 97.4 F | HEIGHT: 60 IN | RESPIRATION RATE: 16 BRPM | DIASTOLIC BLOOD PRESSURE: 70 MMHG | HEART RATE: 72 BPM | BODY MASS INDEX: 26.26 KG/M2 | OXYGEN SATURATION: 92 %

## 2021-01-01 VITALS
RESPIRATION RATE: 16 BRPM | DIASTOLIC BLOOD PRESSURE: 78 MMHG | WEIGHT: 144.4 LBS | SYSTOLIC BLOOD PRESSURE: 116 MMHG | HEIGHT: 60 IN | BODY MASS INDEX: 28.35 KG/M2 | OXYGEN SATURATION: 94 %

## 2021-01-01 VITALS
DIASTOLIC BLOOD PRESSURE: 78 MMHG | SYSTOLIC BLOOD PRESSURE: 116 MMHG | WEIGHT: 144 LBS | BODY MASS INDEX: 28.27 KG/M2 | HEART RATE: 76 BPM | HEIGHT: 60 IN

## 2021-01-01 VITALS
DIASTOLIC BLOOD PRESSURE: 62 MMHG | HEART RATE: 68 BPM | BODY MASS INDEX: 24.11 KG/M2 | SYSTOLIC BLOOD PRESSURE: 92 MMHG | WEIGHT: 122.8 LBS | HEIGHT: 60 IN

## 2021-01-01 VITALS
DIASTOLIC BLOOD PRESSURE: 60 MMHG | TEMPERATURE: 97.2 F | SYSTOLIC BLOOD PRESSURE: 115 MMHG | RESPIRATION RATE: 14 BRPM | WEIGHT: 131.2 LBS | HEART RATE: 61 BPM | OXYGEN SATURATION: 93 % | HEIGHT: 60 IN | BODY MASS INDEX: 25.76 KG/M2

## 2021-01-01 VITALS
OXYGEN SATURATION: 100 % | RESPIRATION RATE: 18 BRPM | WEIGHT: 124 LBS | DIASTOLIC BLOOD PRESSURE: 61 MMHG | SYSTOLIC BLOOD PRESSURE: 118 MMHG | HEART RATE: 61 BPM | HEIGHT: 60 IN | BODY MASS INDEX: 24.35 KG/M2 | TEMPERATURE: 97.7 F

## 2021-01-01 VITALS
SYSTOLIC BLOOD PRESSURE: 115 MMHG | DIASTOLIC BLOOD PRESSURE: 78 MMHG | BODY MASS INDEX: 24.11 KG/M2 | RESPIRATION RATE: 16 BRPM | HEART RATE: 89 BPM | OXYGEN SATURATION: 90 % | WEIGHT: 122.8 LBS | HEIGHT: 60 IN

## 2021-01-01 VITALS
DIASTOLIC BLOOD PRESSURE: 58 MMHG | BODY MASS INDEX: 28.87 KG/M2 | HEIGHT: 60 IN | OXYGEN SATURATION: 100 % | WEIGHT: 147.05 LBS | HEART RATE: 84 BPM | SYSTOLIC BLOOD PRESSURE: 105 MMHG

## 2021-01-01 VITALS
HEART RATE: 100 BPM | BODY MASS INDEX: 27.84 KG/M2 | RESPIRATION RATE: 16 BRPM | OXYGEN SATURATION: 93 % | DIASTOLIC BLOOD PRESSURE: 76 MMHG | WEIGHT: 141.8 LBS | TEMPERATURE: 97.6 F | HEIGHT: 60 IN | SYSTOLIC BLOOD PRESSURE: 140 MMHG

## 2021-01-01 VITALS
DIASTOLIC BLOOD PRESSURE: 70 MMHG | TEMPERATURE: 97.3 F | RESPIRATION RATE: 31 BRPM | WEIGHT: 133.6 LBS | OXYGEN SATURATION: 93 % | SYSTOLIC BLOOD PRESSURE: 101 MMHG | HEIGHT: 60 IN | HEART RATE: 98 BPM | BODY MASS INDEX: 26.23 KG/M2

## 2021-01-01 VITALS
SYSTOLIC BLOOD PRESSURE: 114 MMHG | TEMPERATURE: 97 F | OXYGEN SATURATION: 90 % | HEART RATE: 62 BPM | DIASTOLIC BLOOD PRESSURE: 57 MMHG

## 2021-01-01 VITALS
BODY MASS INDEX: 28.86 KG/M2 | DIASTOLIC BLOOD PRESSURE: 62 MMHG | TEMPERATURE: 98 F | RESPIRATION RATE: 18 BRPM | OXYGEN SATURATION: 81 % | HEIGHT: 60 IN | SYSTOLIC BLOOD PRESSURE: 140 MMHG | HEART RATE: 74 BPM | WEIGHT: 147 LBS

## 2021-01-01 VITALS
HEIGHT: 60 IN | DIASTOLIC BLOOD PRESSURE: 68 MMHG | HEART RATE: 76 BPM | SYSTOLIC BLOOD PRESSURE: 112 MMHG | BODY MASS INDEX: 28.72 KG/M2

## 2021-01-01 VITALS
TEMPERATURE: 98.5 F | HEART RATE: 88 BPM | SYSTOLIC BLOOD PRESSURE: 117 MMHG | RESPIRATION RATE: 24 BRPM | OXYGEN SATURATION: 97 % | DIASTOLIC BLOOD PRESSURE: 56 MMHG

## 2021-01-01 VITALS — TEMPERATURE: 98 F | HEIGHT: 60 IN | BODY MASS INDEX: 26.11 KG/M2 | WEIGHT: 133 LBS | OXYGEN SATURATION: 98 %

## 2021-01-01 VITALS
RESPIRATION RATE: 18 BRPM | DIASTOLIC BLOOD PRESSURE: 65 MMHG | SYSTOLIC BLOOD PRESSURE: 102 MMHG | OXYGEN SATURATION: 92 % | HEIGHT: 60 IN | WEIGHT: 134.48 LBS | BODY MASS INDEX: 26.4 KG/M2 | HEART RATE: 65 BPM | TEMPERATURE: 97.9 F

## 2021-01-01 VITALS — BODY MASS INDEX: 26.31 KG/M2 | WEIGHT: 134 LBS | HEIGHT: 60 IN

## 2021-01-01 VITALS — BODY MASS INDEX: 26.11 KG/M2 | HEIGHT: 60 IN | WEIGHT: 133 LBS

## 2021-01-01 VITALS — BODY MASS INDEX: 24.61 KG/M2 | WEIGHT: 126 LBS

## 2021-01-01 DIAGNOSIS — Z78.9 NEED FOR FOLLOW-UP BY SOCIAL WORKER: Primary | ICD-10-CM

## 2021-01-01 DIAGNOSIS — J45.909 UNCOMPLICATED ASTHMA, UNSPECIFIED ASTHMA SEVERITY, UNSPECIFIED WHETHER PERSISTENT: Primary | ICD-10-CM

## 2021-01-01 DIAGNOSIS — J43.8 OTHER EMPHYSEMA (HCC): ICD-10-CM

## 2021-01-01 DIAGNOSIS — I48.0 PAROXYSMAL ATRIAL FIBRILLATION (HCC): ICD-10-CM

## 2021-01-01 DIAGNOSIS — J96.11 CHRONIC RESPIRATORY FAILURE WITH HYPOXIA (HCC): ICD-10-CM

## 2021-01-01 DIAGNOSIS — J43.8 OTHER EMPHYSEMA (HCC): Primary | ICD-10-CM

## 2021-01-01 DIAGNOSIS — K21.9 GASTROESOPHAGEAL REFLUX DISEASE WITHOUT ESOPHAGITIS: ICD-10-CM

## 2021-01-01 DIAGNOSIS — M54.16 LUMBAR RADICULOPATHY: ICD-10-CM

## 2021-01-01 DIAGNOSIS — M54.50 CHRONIC BILATERAL LOW BACK PAIN WITHOUT SCIATICA: ICD-10-CM

## 2021-01-01 DIAGNOSIS — K25.7 CHRONIC PEPTIC ULCER OF STOMACH: ICD-10-CM

## 2021-01-01 DIAGNOSIS — Z98.890 HISTORY OF ESOPHAGOGASTRODUODENOSCOPY (EGD): ICD-10-CM

## 2021-01-01 DIAGNOSIS — J41.0 SIMPLE CHRONIC BRONCHITIS (HCC): Chronic | ICD-10-CM

## 2021-01-01 DIAGNOSIS — Z48.02 VISIT FOR SUTURE REMOVAL: ICD-10-CM

## 2021-01-01 DIAGNOSIS — I50.32 CHRONIC DIASTOLIC CHF (CONGESTIVE HEART FAILURE) (HCC): Primary | ICD-10-CM

## 2021-01-01 DIAGNOSIS — J44.9 COPD (CHRONIC OBSTRUCTIVE PULMONARY DISEASE) (HCC): Chronic | ICD-10-CM

## 2021-01-01 DIAGNOSIS — R26.2 AMBULATORY DYSFUNCTION: ICD-10-CM

## 2021-01-01 DIAGNOSIS — E87.6 HYPOKALEMIA: ICD-10-CM

## 2021-01-01 DIAGNOSIS — G89.29 CHRONIC BILATERAL LOW BACK PAIN WITHOUT SCIATICA: ICD-10-CM

## 2021-01-01 DIAGNOSIS — I48.91 ATRIAL FIBRILLATION WITH RAPID VENTRICULAR RESPONSE (HCC): ICD-10-CM

## 2021-01-01 DIAGNOSIS — D50.8 OTHER IRON DEFICIENCY ANEMIA: ICD-10-CM

## 2021-01-01 DIAGNOSIS — J41.8 MIXED SIMPLE AND MUCOPURULENT CHRONIC BRONCHITIS (HCC): Primary | Chronic | ICD-10-CM

## 2021-01-01 DIAGNOSIS — F41.9 CHRONIC ANXIETY: Chronic | ICD-10-CM

## 2021-01-01 DIAGNOSIS — S32.040D COMPRESSION FRACTURE OF L4 VERTEBRA WITH ROUTINE HEALING, SUBSEQUENT ENCOUNTER: ICD-10-CM

## 2021-01-01 DIAGNOSIS — E11.9 TYPE 2 DIABETES MELLITUS WITHOUT COMPLICATION, WITHOUT LONG-TERM CURRENT USE OF INSULIN (HCC): ICD-10-CM

## 2021-01-01 DIAGNOSIS — J41.0 BRONCHITIS, CHRONIC, SIMPLE (HCC): ICD-10-CM

## 2021-01-01 DIAGNOSIS — S22.080S CLOSED WEDGE COMPRESSION FRACTURE OF T12 VERTEBRA, SEQUELA: ICD-10-CM

## 2021-01-01 DIAGNOSIS — F41.9 ANXIETY: ICD-10-CM

## 2021-01-01 DIAGNOSIS — U07.1 COVID-19: Primary | ICD-10-CM

## 2021-01-01 DIAGNOSIS — I50.32 CHRONIC DIASTOLIC CONGESTIVE HEART FAILURE (HCC): Primary | ICD-10-CM

## 2021-01-01 DIAGNOSIS — D50.9 IRON DEFICIENCY ANEMIA: ICD-10-CM

## 2021-01-01 DIAGNOSIS — I10 ESSENTIAL HYPERTENSION: ICD-10-CM

## 2021-01-01 DIAGNOSIS — G89.4 CHRONIC PAIN SYNDROME: ICD-10-CM

## 2021-01-01 DIAGNOSIS — S32.040S COMPRESSION FRACTURE OF L4 VERTEBRA, SEQUELA: ICD-10-CM

## 2021-01-01 DIAGNOSIS — K59.1 FUNCTIONAL DIARRHEA: ICD-10-CM

## 2021-01-01 DIAGNOSIS — J44.1 COPD EXACERBATION (HCC): Primary | ICD-10-CM

## 2021-01-01 DIAGNOSIS — M81.0 OSTEOPOROSIS, UNSPECIFIED OSTEOPOROSIS TYPE, UNSPECIFIED PATHOLOGICAL FRACTURE PRESENCE: ICD-10-CM

## 2021-01-01 DIAGNOSIS — G89.4 CHRONIC PAIN SYNDROME: Primary | ICD-10-CM

## 2021-01-01 DIAGNOSIS — R00.2 PALPITATIONS: ICD-10-CM

## 2021-01-01 DIAGNOSIS — S91.312A LACERATION OF LEFT FOOT, INITIAL ENCOUNTER: Primary | ICD-10-CM

## 2021-01-01 DIAGNOSIS — S91.312A LACERATION OF LEFT FOOT, INITIAL ENCOUNTER: ICD-10-CM

## 2021-01-01 DIAGNOSIS — Z79.899 MEDICATION MANAGEMENT: ICD-10-CM

## 2021-01-01 DIAGNOSIS — J41.8 MIXED SIMPLE AND MUCOPURULENT CHRONIC BRONCHITIS (HCC): Chronic | ICD-10-CM

## 2021-01-01 DIAGNOSIS — J41.0 SIMPLE CHRONIC BRONCHITIS (HCC): Primary | Chronic | ICD-10-CM

## 2021-01-01 DIAGNOSIS — I50.32 CHRONIC DIASTOLIC CHF (CONGESTIVE HEART FAILURE) (HCC): ICD-10-CM

## 2021-01-01 DIAGNOSIS — Z87.11 PERSONAL HISTORY OF PEPTIC ULCER DISEASE: ICD-10-CM

## 2021-01-01 DIAGNOSIS — Z59.9 FINANCIAL DIFFICULTIES: Primary | ICD-10-CM

## 2021-01-01 DIAGNOSIS — F11.20 UNCOMPLICATED OPIOID DEPENDENCE (HCC): ICD-10-CM

## 2021-01-01 DIAGNOSIS — Z79.891 ENCOUNTER FOR LONG-TERM OPIATE ANALGESIC USE: ICD-10-CM

## 2021-01-01 DIAGNOSIS — J30.2 SEASONAL ALLERGIES: ICD-10-CM

## 2021-01-01 DIAGNOSIS — F41.9 ANXIETY: Primary | ICD-10-CM

## 2021-01-01 DIAGNOSIS — I48.91 ATRIAL FIBRILLATION WITH RAPID VENTRICULAR RESPONSE (HCC): Primary | ICD-10-CM

## 2021-01-01 DIAGNOSIS — B97.21 SARS-ASSOCIATED CORONAVIRUS AS THE CAUSE OF DISEASES CLASSIFIED ELSEWHERE: ICD-10-CM

## 2021-01-01 DIAGNOSIS — Z92.89 HISTORY OF RECENT HOSPITALIZATION: Primary | ICD-10-CM

## 2021-01-01 DIAGNOSIS — J96.11 CHRONIC RESPIRATORY FAILURE WITH HYPOXIA (HCC): Chronic | ICD-10-CM

## 2021-01-01 DIAGNOSIS — E87.8 POTASSIUM DISORDER: ICD-10-CM

## 2021-01-01 DIAGNOSIS — M46.1 SACROILIITIS (HCC): ICD-10-CM

## 2021-01-01 DIAGNOSIS — Z79.899 MEDICATION MANAGEMENT: Primary | ICD-10-CM

## 2021-01-01 DIAGNOSIS — E11.9 TYPE 2 DIABETES MELLITUS WITHOUT COMPLICATION, WITHOUT LONG-TERM CURRENT USE OF INSULIN (HCC): Primary | ICD-10-CM

## 2021-01-01 DIAGNOSIS — H35.30 MACULAR DEGENERATION OF BOTH EYES, UNSPECIFIED TYPE: ICD-10-CM

## 2021-01-01 DIAGNOSIS — Z09 FOLLOW-UP EXAMINATION: Primary | ICD-10-CM

## 2021-01-01 DIAGNOSIS — Z09 FOLLOW-UP EXAM, LESS THAN 3 MONTHS SINCE PREVIOUS EXAM: Primary | ICD-10-CM

## 2021-01-01 DIAGNOSIS — Z23 ENCOUNTER FOR IMMUNIZATION: ICD-10-CM

## 2021-01-01 DIAGNOSIS — I51.7 CARDIOMEGALY: ICD-10-CM

## 2021-01-01 DIAGNOSIS — J44.1 COPD EXACERBATION (HCC): ICD-10-CM

## 2021-01-01 DIAGNOSIS — K22.2 ESOPHAGEAL OBSTRUCTION: ICD-10-CM

## 2021-01-01 DIAGNOSIS — R06.02 SHORTNESS OF BREATH: ICD-10-CM

## 2021-01-01 DIAGNOSIS — R23.8 EASY BRUISING: ICD-10-CM

## 2021-01-01 DIAGNOSIS — M81.0 OSTEOPOROSIS, UNSPECIFIED OSTEOPOROSIS TYPE, UNSPECIFIED PATHOLOGICAL FRACTURE PRESENCE: Primary | ICD-10-CM

## 2021-01-01 DIAGNOSIS — Z09 ENCOUNTER FOR FOLLOW-UP: Primary | ICD-10-CM

## 2021-01-01 DIAGNOSIS — L65.9 HAIR LOSS: ICD-10-CM

## 2021-01-01 DIAGNOSIS — M54.9 CHRONIC BACK PAIN: Primary | ICD-10-CM

## 2021-01-01 DIAGNOSIS — I50.9 CONGESTIVE HEART FAILURE (HCC): ICD-10-CM

## 2021-01-01 DIAGNOSIS — G89.29 CHRONIC BACK PAIN: Primary | ICD-10-CM

## 2021-01-01 DIAGNOSIS — M54.9 INTRACTABLE BACK PAIN: ICD-10-CM

## 2021-01-01 DIAGNOSIS — M54.50 ACUTE LOW BACK PAIN: Primary | ICD-10-CM

## 2021-01-01 DIAGNOSIS — Z20.822 COVID-19 RULED OUT: Primary | ICD-10-CM

## 2021-01-01 DIAGNOSIS — K94.20 GASTROSTOMY COMPLICATION, UNSPECIFIED (HCC): ICD-10-CM

## 2021-01-01 DIAGNOSIS — Z92.89 HISTORY OF RECENT HOSPITALIZATION: ICD-10-CM

## 2021-01-01 DIAGNOSIS — G89.29 CHRONIC RIGHT-SIDED LOW BACK PAIN WITHOUT SCIATICA: ICD-10-CM

## 2021-01-01 DIAGNOSIS — K21.9 GASTRO-ESOPHAGEAL REFLUX DISEASE WITHOUT ESOPHAGITIS: ICD-10-CM

## 2021-01-01 DIAGNOSIS — I50.9 CONGESTIVE HEART FAILURE, UNSPECIFIED HF CHRONICITY, UNSPECIFIED HEART FAILURE TYPE (HCC): Primary | ICD-10-CM

## 2021-01-01 DIAGNOSIS — I50.9 CONGESTIVE HEART FAILURE, UNSPECIFIED HF CHRONICITY, UNSPECIFIED HEART FAILURE TYPE (HCC): ICD-10-CM

## 2021-01-01 DIAGNOSIS — M54.50 CHRONIC RIGHT-SIDED LOW BACK PAIN WITHOUT SCIATICA: ICD-10-CM

## 2021-01-01 DIAGNOSIS — R09.89 PULMONARY VASCULAR CONGESTION: ICD-10-CM

## 2021-01-01 LAB
6MAM UR QL CFM: NEGATIVE NG/ML
7AMINOCLONAZEPAM UR QL CFM: NEGATIVE NG/ML
A-OH ALPRAZ UR QL CFM: NEGATIVE NG/ML
ACCEPTABLE CREAT UR QL: NORMAL MG/DL
ACCEPTIBLE SP GR UR QL: NORMAL
ALBUMIN SERPL BCP-MCNC: 3.1 G/DL (ref 3.5–5.7)
ALBUMIN SERPL BCP-MCNC: 3.4 G/DL (ref 3.5–5.7)
ALBUMIN SERPL BCP-MCNC: 4 G/DL (ref 3.5–5.7)
ALP SERPL-CCNC: 43 U/L (ref 55–165)
ALP SERPL-CCNC: 44 U/L (ref 55–165)
ALP SERPL-CCNC: 52 U/L (ref 55–165)
ALT SERPL W P-5'-P-CCNC: 11 U/L (ref 7–52)
ALT SERPL W P-5'-P-CCNC: 13 U/L (ref 7–52)
ALT SERPL W P-5'-P-CCNC: 24 U/L (ref 7–52)
AMITRIP UR QL CFM: NEGATIVE NG/ML
AMPHET UR QL CFM: NEGATIVE NG/ML
AMPHET UR QL CFM: NEGATIVE NG/ML
ANION GAP SERPL CALCULATED.3IONS-SCNC: 10 MMOL/L (ref 4–13)
ANION GAP SERPL CALCULATED.3IONS-SCNC: 11 MMOL/L (ref 4–13)
ANION GAP SERPL CALCULATED.3IONS-SCNC: 12 MMOL/L (ref 4–13)
ANION GAP SERPL CALCULATED.3IONS-SCNC: 12 MMOL/L (ref 4–13)
ANION GAP SERPL CALCULATED.3IONS-SCNC: 5 MMOL/L (ref 4–13)
ANION GAP SERPL CALCULATED.3IONS-SCNC: 6 MMOL/L (ref 4–13)
ANION GAP SERPL CALCULATED.3IONS-SCNC: 7 MMOL/L (ref 4–13)
ANION GAP SERPL CALCULATED.3IONS-SCNC: 8 MMOL/L (ref 4–13)
ANION GAP SERPL CALCULATED.3IONS-SCNC: 8 MMOL/L (ref 4–13)
ANION GAP SERPL CALCULATED.3IONS-SCNC: 9 MMOL/L (ref 4–13)
ANISOCYTOSIS BLD QL SMEAR: PRESENT
APTT PPP: 26 SECONDS (ref 23–37)
AST SERPL W P-5'-P-CCNC: 10 U/L (ref 13–39)
AST SERPL W P-5'-P-CCNC: 11 U/L (ref 13–39)
AST SERPL W P-5'-P-CCNC: 11 U/L (ref 13–39)
ATRIAL RATE: 105 BPM
ATRIAL RATE: 107 BPM
ATRIAL RATE: 182 BPM
ATRIAL RATE: 192 BPM
ATRIAL RATE: 76 BPM
ATRIAL RATE: 83 BPM
ATRIAL RATE: 96 BPM
BASOPHILS # BLD AUTO: 0 THOUSANDS/ΜL (ref 0–0.1)
BASOPHILS # BLD AUTO: 0.1 THOUSANDS/ΜL (ref 0–0.1)
BASOPHILS # BLD MANUAL: 0 THOUSAND/UL (ref 0–0.1)
BASOPHILS NFR BLD AUTO: 0 % (ref 0–2)
BASOPHILS NFR BLD AUTO: 0 % (ref 0–2)
BASOPHILS NFR BLD AUTO: 1 % (ref 0–2)
BASOPHILS NFR BLD AUTO: 1 % (ref 0–2)
BASOPHILS NFR MAR MANUAL: 0 % (ref 0–1)
BENZODIAZ UR QL SCN: NORMAL
BILIRUB SERPL-MCNC: 0.3 MG/DL (ref 0.2–1)
BILIRUB SERPL-MCNC: 0.3 MG/DL (ref 0.2–1)
BILIRUB SERPL-MCNC: 0.5 MG/DL (ref 0.2–1)
BNP SERPL-MCNC: 120 PG/ML (ref 1–100)
BNP SERPL-MCNC: 158 PG/ML (ref 1–100)
BNP SERPL-MCNC: 476 PG/ML (ref 1–100)
BUN SERPL-MCNC: 10 MG/DL (ref 7–25)
BUN SERPL-MCNC: 10 MG/DL (ref 7–25)
BUN SERPL-MCNC: 11 MG/DL (ref 7–25)
BUN SERPL-MCNC: 13 MG/DL (ref 7–25)
BUN SERPL-MCNC: 13 MG/DL (ref 7–25)
BUN SERPL-MCNC: 17 MG/DL (ref 5–25)
BUN SERPL-MCNC: 18 MG/DL (ref 7–25)
BUN SERPL-MCNC: 27 MG/DL (ref 5–25)
BUN SERPL-MCNC: 7 MG/DL (ref 7–25)
BUN SERPL-MCNC: 7 MG/DL (ref 7–25)
BUN SERPL-MCNC: 8 MG/DL (ref 7–25)
BUN SERPL-MCNC: 8 MG/DL (ref 7–25)
BUN SERPL-MCNC: 9 MG/DL (ref 7–25)
BUPRENORPHINE UR QL CFM: NEGATIVE NG/ML
BUTALBITAL UR QL CFM: NEGATIVE NG/ML
BZE UR QL CFM: NEGATIVE NG/ML
CALCIUM ALBUM COR SERPL-MCNC: 8.1 MG/DL (ref 8.3–10.1)
CALCIUM ALBUM COR SERPL-MCNC: 8.4 MG/DL (ref 8.3–10.1)
CALCIUM SERPL-MCNC: 7.4 MG/DL (ref 8.6–10.5)
CALCIUM SERPL-MCNC: 7.5 MG/DL (ref 8.6–10.5)
CALCIUM SERPL-MCNC: 7.7 MG/DL (ref 8.6–10.5)
CALCIUM SERPL-MCNC: 7.7 MG/DL (ref 8.6–10.5)
CALCIUM SERPL-MCNC: 7.8 MG/DL (ref 8.6–10.5)
CALCIUM SERPL-MCNC: 7.9 MG/DL (ref 8.6–10.5)
CALCIUM SERPL-MCNC: 8 MG/DL (ref 8.6–10.5)
CALCIUM SERPL-MCNC: 8.1 MG/DL (ref 8.6–10.5)
CALCIUM SERPL-MCNC: 8.1 MG/DL (ref 8.6–10.5)
CALCIUM SERPL-MCNC: 8.4 MG/DL (ref 8.6–10.5)
CALCIUM SERPL-MCNC: 8.5 MG/DL (ref 8.6–10.5)
CALCIUM SERPL-MCNC: 9.2 MG/DL (ref 8.3–10.1)
CALCIUM SERPL-MCNC: 9.4 MG/DL (ref 8.6–10.5)
CALCIUM SERPL-MCNC: 9.7 MG/DL (ref 8.6–10.5)
CALCIUM SERPL-MCNC: 9.8 MG/DL (ref 8.3–10.1)
CARISOPRODOL UR QL CFM: NEGATIVE NG/ML
CHLORIDE SERPL-SCNC: 101 MMOL/L (ref 98–107)
CHLORIDE SERPL-SCNC: 102 MMOL/L (ref 98–107)
CHLORIDE SERPL-SCNC: 102 MMOL/L (ref 98–107)
CHLORIDE SERPL-SCNC: 103 MMOL/L (ref 100–108)
CHLORIDE SERPL-SCNC: 103 MMOL/L (ref 100–108)
CHLORIDE SERPL-SCNC: 104 MMOL/L (ref 98–107)
CHLORIDE SERPL-SCNC: 104 MMOL/L (ref 98–107)
CHLORIDE SERPL-SCNC: 93 MMOL/L (ref 98–107)
CHLORIDE SERPL-SCNC: 93 MMOL/L (ref 98–107)
CHLORIDE SERPL-SCNC: 95 MMOL/L (ref 98–107)
CHLORIDE SERPL-SCNC: 96 MMOL/L (ref 98–107)
CHLORIDE SERPL-SCNC: 97 MMOL/L (ref 98–107)
CHLORIDE SERPL-SCNC: 98 MMOL/L (ref 98–107)
CO2 SERPL-SCNC: 23 MMOL/L (ref 21–31)
CO2 SERPL-SCNC: 24 MMOL/L (ref 21–31)
CO2 SERPL-SCNC: 27 MMOL/L (ref 21–31)
CO2 SERPL-SCNC: 28 MMOL/L (ref 21–31)
CO2 SERPL-SCNC: 28 MMOL/L (ref 21–31)
CO2 SERPL-SCNC: 29 MMOL/L (ref 21–31)
CO2 SERPL-SCNC: 29 MMOL/L (ref 21–31)
CO2 SERPL-SCNC: 29 MMOL/L (ref 21–32)
CO2 SERPL-SCNC: 30 MMOL/L (ref 21–31)
CO2 SERPL-SCNC: 30 MMOL/L (ref 21–32)
CO2 SERPL-SCNC: 31 MMOL/L (ref 21–31)
CO2 SERPL-SCNC: 32 MMOL/L (ref 21–31)
CO2 SERPL-SCNC: 33 MMOL/L (ref 21–31)
CODEINE UR QL CFM: NEGATIVE NG/ML
CREAT SERPL-MCNC: 0.52 MG/DL (ref 0.6–1.2)
CREAT SERPL-MCNC: 0.53 MG/DL (ref 0.6–1.2)
CREAT SERPL-MCNC: 0.54 MG/DL (ref 0.6–1.2)
CREAT SERPL-MCNC: 0.55 MG/DL (ref 0.6–1.2)
CREAT SERPL-MCNC: 0.57 MG/DL (ref 0.6–1.2)
CREAT SERPL-MCNC: 0.58 MG/DL (ref 0.6–1.2)
CREAT SERPL-MCNC: 0.62 MG/DL (ref 0.6–1.2)
CREAT SERPL-MCNC: 0.62 MG/DL (ref 0.6–1.2)
CREAT SERPL-MCNC: 0.63 MG/DL (ref 0.6–1.2)
CREAT SERPL-MCNC: 0.63 MG/DL (ref 0.6–1.2)
CREAT SERPL-MCNC: 0.65 MG/DL (ref 0.6–1.2)
CREAT SERPL-MCNC: 0.68 MG/DL (ref 0.6–1.2)
CREAT SERPL-MCNC: 0.7 MG/DL (ref 0.6–1.2)
CREAT SERPL-MCNC: 0.88 MG/DL (ref 0.6–1.3)
CREAT SERPL-MCNC: 1.07 MG/DL (ref 0.6–1.3)
CREAT UR-MCNC: <13 MG/DL
DACRYOCYTES BLD QL SMEAR: PRESENT
DESIPRAMINE UR QL CFM: NEGATIVE NG/ML
EDDP UR QL CFM: NEGATIVE NG/ML
EOSINOPHIL # BLD AUTO: 0 THOUSAND/ΜL (ref 0–0.61)
EOSINOPHIL # BLD AUTO: 0 THOUSAND/ΜL (ref 0–0.61)
EOSINOPHIL # BLD AUTO: 0.1 THOUSAND/ΜL (ref 0–0.61)
EOSINOPHIL # BLD AUTO: 0.1 THOUSAND/ΜL (ref 0–0.61)
EOSINOPHIL # BLD MANUAL: 0 THOUSAND/UL (ref 0–0.4)
EOSINOPHIL NFR BLD AUTO: 0 % (ref 0–5)
EOSINOPHIL NFR BLD AUTO: 0 % (ref 0–5)
EOSINOPHIL NFR BLD AUTO: 1 % (ref 0–5)
EOSINOPHIL NFR BLD AUTO: 1 % (ref 0–5)
EOSINOPHIL NFR BLD MANUAL: 0 % (ref 0–6)
ERYTHROCYTE [DISTWIDTH] IN BLOOD BY AUTOMATED COUNT: 15.9 % (ref 11.6–15.1)
ERYTHROCYTE [DISTWIDTH] IN BLOOD BY AUTOMATED COUNT: 17.8 % (ref 11.5–14.5)
ERYTHROCYTE [DISTWIDTH] IN BLOOD BY AUTOMATED COUNT: 19.5 % (ref 11.5–14.5)
ERYTHROCYTE [DISTWIDTH] IN BLOOD BY AUTOMATED COUNT: 19.9 % (ref 11.5–14.5)
ERYTHROCYTE [DISTWIDTH] IN BLOOD BY AUTOMATED COUNT: 20 % (ref 11.5–14.5)
ERYTHROCYTE [DISTWIDTH] IN BLOOD BY AUTOMATED COUNT: 20.1 % (ref 11.5–14.5)
ERYTHROCYTE [DISTWIDTH] IN BLOOD BY AUTOMATED COUNT: 20.2 % (ref 11.5–14.5)
ERYTHROCYTE [DISTWIDTH] IN BLOOD BY AUTOMATED COUNT: 20.2 % (ref 11.5–14.5)
ERYTHROCYTE [DISTWIDTH] IN BLOOD BY AUTOMATED COUNT: 20.4 % (ref 11.5–14.5)
EST. AVERAGE GLUCOSE BLD GHB EST-MCNC: 146 MG/DL
EST. AVERAGE GLUCOSE BLD GHB EST-MCNC: 309 MG/DL
FENTANYL UR QL CFM: NEGATIVE NG/ML
FLUAV RNA RESP QL NAA+PROBE: NEGATIVE
FLUAV RNA RESP QL NAA+PROBE: NEGATIVE
FLUBV RNA RESP QL NAA+PROBE: NEGATIVE
FLUBV RNA RESP QL NAA+PROBE: NEGATIVE
GFR SERPL CREATININE-BSD FRML MDRD: 48 ML/MIN/1.73SQ M
GFR SERPL CREATININE-BSD FRML MDRD: 61 ML/MIN/1.73SQ M
GFR SERPL CREATININE-BSD FRML MDRD: 80 ML/MIN/1.73SQ M
GFR SERPL CREATININE-BSD FRML MDRD: 81 ML/MIN/1.73SQ M
GFR SERPL CREATININE-BSD FRML MDRD: 82 ML/MIN/1.73SQ M
GFR SERPL CREATININE-BSD FRML MDRD: 83 ML/MIN/1.73SQ M
GFR SERPL CREATININE-BSD FRML MDRD: 83 ML/MIN/1.73SQ M
GFR SERPL CREATININE-BSD FRML MDRD: 84 ML/MIN/1.73SQ M
GFR SERPL CREATININE-BSD FRML MDRD: 84 ML/MIN/1.73SQ M
GFR SERPL CREATININE-BSD FRML MDRD: 86 ML/MIN/1.73SQ M
GFR SERPL CREATININE-BSD FRML MDRD: 86 ML/MIN/1.73SQ M
GFR SERPL CREATININE-BSD FRML MDRD: 87 ML/MIN/1.73SQ M
GFR SERPL CREATININE-BSD FRML MDRD: 88 ML/MIN/1.73SQ M
GFR SERPL CREATININE-BSD FRML MDRD: 88 ML/MIN/1.73SQ M
GFR SERPL CREATININE-BSD FRML MDRD: 89 ML/MIN/1.73SQ M
GIANT PLATELETS BLD QL SMEAR: PRESENT
GIANT PLATELETS BLD QL SMEAR: PRESENT
GLIADIN IGG SER IA-ACNC: NEGATIVE NG/ML
GLUCOSE 30M P 50 G LAC PO SERPL-MCNC: NEGATIVE NG/ML
GLUCOSE P FAST SERPL-MCNC: 136 MG/DL (ref 65–99)
GLUCOSE SERPL-MCNC: 100 MG/DL (ref 65–140)
GLUCOSE SERPL-MCNC: 102 MG/DL (ref 65–140)
GLUCOSE SERPL-MCNC: 104 MG/DL (ref 65–140)
GLUCOSE SERPL-MCNC: 105 MG/DL (ref 65–140)
GLUCOSE SERPL-MCNC: 106 MG/DL (ref 65–99)
GLUCOSE SERPL-MCNC: 109 MG/DL (ref 65–140)
GLUCOSE SERPL-MCNC: 111 MG/DL (ref 65–140)
GLUCOSE SERPL-MCNC: 111 MG/DL (ref 65–99)
GLUCOSE SERPL-MCNC: 113 MG/DL (ref 65–140)
GLUCOSE SERPL-MCNC: 114 MG/DL (ref 65–99)
GLUCOSE SERPL-MCNC: 115 MG/DL (ref 65–140)
GLUCOSE SERPL-MCNC: 116 MG/DL (ref 65–99)
GLUCOSE SERPL-MCNC: 116 MG/DL (ref 65–99)
GLUCOSE SERPL-MCNC: 117 MG/DL (ref 65–140)
GLUCOSE SERPL-MCNC: 121 MG/DL (ref 65–140)
GLUCOSE SERPL-MCNC: 121 MG/DL (ref 65–99)
GLUCOSE SERPL-MCNC: 122 MG/DL (ref 65–140)
GLUCOSE SERPL-MCNC: 124 MG/DL (ref 65–140)
GLUCOSE SERPL-MCNC: 130 MG/DL (ref 65–140)
GLUCOSE SERPL-MCNC: 134 MG/DL (ref 65–140)
GLUCOSE SERPL-MCNC: 138 MG/DL (ref 65–140)
GLUCOSE SERPL-MCNC: 139 MG/DL (ref 65–140)
GLUCOSE SERPL-MCNC: 140 MG/DL (ref 65–99)
GLUCOSE SERPL-MCNC: 142 MG/DL (ref 65–140)
GLUCOSE SERPL-MCNC: 151 MG/DL (ref 65–140)
GLUCOSE SERPL-MCNC: 152 MG/DL (ref 65–140)
GLUCOSE SERPL-MCNC: 158 MG/DL (ref 65–140)
GLUCOSE SERPL-MCNC: 159 MG/DL (ref 65–99)
GLUCOSE SERPL-MCNC: 160 MG/DL (ref 65–140)
GLUCOSE SERPL-MCNC: 164 MG/DL (ref 65–140)
GLUCOSE SERPL-MCNC: 172 MG/DL (ref 65–140)
GLUCOSE SERPL-MCNC: 178 MG/DL (ref 65–140)
GLUCOSE SERPL-MCNC: 184 MG/DL (ref 65–140)
GLUCOSE SERPL-MCNC: 186 MG/DL (ref 65–140)
GLUCOSE SERPL-MCNC: 186 MG/DL (ref 65–99)
GLUCOSE SERPL-MCNC: 190 MG/DL (ref 65–140)
GLUCOSE SERPL-MCNC: 196 MG/DL (ref 65–140)
GLUCOSE SERPL-MCNC: 197 MG/DL (ref 65–140)
GLUCOSE SERPL-MCNC: 197 MG/DL (ref 65–140)
GLUCOSE SERPL-MCNC: 205 MG/DL (ref 65–99)
GLUCOSE SERPL-MCNC: 207 MG/DL (ref 65–140)
GLUCOSE SERPL-MCNC: 234 MG/DL (ref 65–140)
GLUCOSE SERPL-MCNC: 241 MG/DL (ref 65–140)
GLUCOSE SERPL-MCNC: 245 MG/DL (ref 65–140)
GLUCOSE SERPL-MCNC: 250 MG/DL (ref 65–140)
GLUCOSE SERPL-MCNC: 260 MG/DL (ref 65–140)
GLUCOSE SERPL-MCNC: 261 MG/DL (ref 65–140)
GLUCOSE SERPL-MCNC: 265 MG/DL (ref 65–140)
GLUCOSE SERPL-MCNC: 278 MG/DL (ref 65–140)
GLUCOSE SERPL-MCNC: 281 MG/DL (ref 65–99)
GLUCOSE SERPL-MCNC: 287 MG/DL (ref 65–140)
GLUCOSE SERPL-MCNC: 294 MG/DL (ref 65–140)
GLUCOSE SERPL-MCNC: 307 MG/DL (ref 65–140)
GLUCOSE SERPL-MCNC: 307 MG/DL (ref 65–99)
GLUCOSE SERPL-MCNC: 321 MG/DL (ref 65–140)
GLUCOSE SERPL-MCNC: 326 MG/DL (ref 65–99)
GLUCOSE SERPL-MCNC: 338 MG/DL (ref 65–140)
GLUCOSE SERPL-MCNC: 357 MG/DL (ref 65–140)
GLUCOSE SERPL-MCNC: 77 MG/DL (ref 65–140)
GLUCOSE SERPL-MCNC: 98 MG/DL (ref 65–140)
GLUCOSE SERPL-MCNC: 99 MG/DL (ref 65–140)
HBA1C MFR BLD: 12.4 %
HBA1C MFR BLD: 6.7 %
HCT VFR BLD AUTO: 28.1 % (ref 42–47)
HCT VFR BLD AUTO: 28.4 % (ref 42–47)
HCT VFR BLD AUTO: 31.6 % (ref 42–47)
HCT VFR BLD AUTO: 32.3 % (ref 42–47)
HCT VFR BLD AUTO: 33 % (ref 42–47)
HCT VFR BLD AUTO: 33.4 % (ref 42–47)
HCT VFR BLD AUTO: 34.1 % (ref 42–47)
HCT VFR BLD AUTO: 34.1 % (ref 42–47)
HCT VFR BLD AUTO: 35.7 % (ref 42–47)
HCT VFR BLD AUTO: 36.5 % (ref 34.8–46.1)
HCT VFR BLD AUTO: 36.8 % (ref 42–47)
HCT VFR BLD AUTO: 37.6 % (ref 42–47)
HGB BLD-MCNC: 10.1 G/DL (ref 12–16)
HGB BLD-MCNC: 10.2 G/DL (ref 12–16)
HGB BLD-MCNC: 10.4 G/DL (ref 12–16)
HGB BLD-MCNC: 10.5 G/DL (ref 11.5–15.4)
HGB BLD-MCNC: 10.6 G/DL (ref 12–16)
HGB BLD-MCNC: 10.6 G/DL (ref 12–16)
HGB BLD-MCNC: 11.1 G/DL (ref 12–16)
HGB BLD-MCNC: 11.5 G/DL (ref 12–16)
HGB BLD-MCNC: 11.5 G/DL (ref 12–16)
HGB BLD-MCNC: 8.7 G/DL (ref 12–16)
HGB BLD-MCNC: 8.7 G/DL (ref 12–16)
HGB BLD-MCNC: 9.8 G/DL (ref 12–16)
HYDROCODONE UR QL CFM: NEGATIVE NG/ML
HYDROCODONE UR QL CFM: NEGATIVE NG/ML
HYDROMORPHONE UR QL CFM: NEGATIVE NG/ML
HYPERCHROMIA BLD QL SMEAR: PRESENT
HYPERCHROMIA BLD QL SMEAR: PRESENT
INR PPP: 0.91 (ref 0.84–1.19)
INR PPP: 1.1 (ref 0.84–1.19)
LG PLATELETS BLD QL SMEAR: PRESENT
LORAZEPAM UR QL CFM: NEGATIVE NG/ML
LYMPHOCYTES # BLD AUTO: 0.5 THOUSAND/UL (ref 0.6–4.47)
LYMPHOCYTES # BLD AUTO: 0.7 THOUSANDS/ΜL (ref 0.6–4.47)
LYMPHOCYTES # BLD AUTO: 0.7 THOUSANDS/ΜL (ref 0.6–4.47)
LYMPHOCYTES # BLD AUTO: 0.82 THOUSAND/UL (ref 0.6–4.47)
LYMPHOCYTES # BLD AUTO: 1.2 THOUSANDS/ΜL (ref 0.6–4.47)
LYMPHOCYTES # BLD AUTO: 1.4 THOUSANDS/ΜL (ref 0.6–4.47)
LYMPHOCYTES # BLD AUTO: 1.58 THOUSAND/UL (ref 0.6–4.47)
LYMPHOCYTES # BLD AUTO: 19 % (ref 20–51)
LYMPHOCYTES # BLD AUTO: 5 % (ref 14–44)
LYMPHOCYTES # BLD AUTO: 9 % (ref 20–51)
LYMPHOCYTES NFR BLD AUTO: 12 % (ref 21–51)
LYMPHOCYTES NFR BLD AUTO: 13 % (ref 21–51)
LYMPHOCYTES NFR BLD AUTO: 14 % (ref 21–51)
LYMPHOCYTES NFR BLD AUTO: 18 % (ref 21–51)
MAGNESIUM SERPL-MCNC: 1 MG/DL (ref 1.9–2.7)
MAGNESIUM SERPL-MCNC: 1.6 MG/DL (ref 1.9–2.7)
MAGNESIUM SERPL-MCNC: 2.1 MG/DL (ref 1.9–2.7)
MAGNESIUM SERPL-MCNC: 2.4 MG/DL (ref 1.9–2.7)
MCH RBC QN AUTO: 22.2 PG (ref 26–34)
MCH RBC QN AUTO: 22.4 PG (ref 26–34)
MCH RBC QN AUTO: 22.5 PG (ref 26–34)
MCH RBC QN AUTO: 22.7 PG (ref 26–34)
MCH RBC QN AUTO: 22.7 PG (ref 26–34)
MCH RBC QN AUTO: 22.9 PG (ref 26–34)
MCH RBC QN AUTO: 23 PG (ref 26–34)
MCH RBC QN AUTO: 23.1 PG (ref 26–34)
MCH RBC QN AUTO: 23.2 PG (ref 26.8–34.3)
MCH RBC QN AUTO: 23.2 PG (ref 26–34)
MCH RBC QN AUTO: 23.2 PG (ref 26–34)
MCH RBC QN AUTO: 23.9 PG (ref 26–34)
MCHC RBC AUTO-ENTMCNC: 28.8 G/DL (ref 31.4–37.4)
MCHC RBC AUTO-ENTMCNC: 30.5 G/DL (ref 31–37)
MCHC RBC AUTO-ENTMCNC: 30.8 G/DL (ref 31–37)
MCHC RBC AUTO-ENTMCNC: 31.1 G/DL (ref 31–37)
MCHC RBC AUTO-ENTMCNC: 31.1 G/DL (ref 31–37)
MCHC RBC AUTO-ENTMCNC: 31.2 G/DL (ref 31–37)
MCHC RBC AUTO-ENTMCNC: 31.3 G/DL (ref 31–37)
MCV RBC AUTO: 72 FL (ref 81–99)
MCV RBC AUTO: 72 FL (ref 81–99)
MCV RBC AUTO: 73 FL (ref 81–99)
MCV RBC AUTO: 74 FL (ref 81–99)
MCV RBC AUTO: 75 FL (ref 81–99)
MCV RBC AUTO: 75 FL (ref 81–99)
MCV RBC AUTO: 78 FL (ref 81–99)
MCV RBC AUTO: 81 FL (ref 82–98)
MDMA UR QL CFM: NEGATIVE NG/ML
MEPROBAMATE UR QL CFM: NEGATIVE NG/ML
METAMYELOCYTES NFR BLD MANUAL: 3 % (ref 0–1)
METHADONE UR QL CFM: NEGATIVE NG/ML
METHAMPHET UR QL CFM: NEGATIVE NG/ML
MICROALBUMIN UR-MCNC: <5 MG/L (ref 0–20)
MICROCYTES BLD QL AUTO: PRESENT
MONOCYTES # BLD AUTO: 0.2 THOUSAND/UL (ref 0–1.22)
MONOCYTES # BLD AUTO: 0.4 THOUSAND/ΜL (ref 0.17–1.22)
MONOCYTES # BLD AUTO: 0.46 THOUSAND/UL (ref 0–1.22)
MONOCYTES # BLD AUTO: 0.5 THOUSAND/ΜL (ref 0.17–1.22)
MONOCYTES # BLD AUTO: 0.58 THOUSAND/UL (ref 0–1.22)
MONOCYTES # BLD AUTO: 0.6 THOUSAND/ΜL (ref 0.17–1.22)
MONOCYTES # BLD AUTO: 0.7 THOUSAND/ΜL (ref 0.17–1.22)
MONOCYTES NFR BLD AUTO: 5 % (ref 4–12)
MONOCYTES NFR BLD AUTO: 7 % (ref 2–12)
MONOCYTES NFR BLD AUTO: 7 % (ref 4–12)
MONOCYTES NFR BLD AUTO: 8 % (ref 2–12)
MONOCYTES NFR BLD AUTO: 8 % (ref 2–12)
MONOCYTES NFR BLD AUTO: 9 % (ref 2–12)
MONOCYTES NFR BLD: 2 % (ref 4–12)
MORPHINE UR QL CFM: NEGATIVE NG/ML
MORPHINE UR QL CFM: NEGATIVE NG/ML
MRSA NOSE QL CULT: NORMAL
MYELOCYTES NFR BLD MANUAL: 2 % (ref 0–1)
MYELOCYTES NFR BLD MANUAL: 2 % (ref 0–1)
NALTREXONE UR QL CFM: NEGATIVE NG/ML
NEUTROPHILS # BLD AUTO: 4.5 THOUSANDS/ΜL (ref 1.4–6.5)
NEUTROPHILS # BLD AUTO: 4.7 THOUSANDS/ΜL (ref 1.4–6.5)
NEUTROPHILS # BLD AUTO: 5.5 THOUSANDS/ΜL (ref 1.4–6.5)
NEUTROPHILS # BLD AUTO: 6.6 THOUSANDS/ΜL (ref 1.4–6.5)
NEUTROPHILS # BLD MANUAL: 8.96 THOUSAND/UL (ref 1.85–7.62)
NEUTS BAND NFR BLD MANUAL: 3 % (ref 0–8)
NEUTS BAND NFR BLD MANUAL: 4 % (ref 0–8)
NEUTS SEG # BLD: 5.98 THOUSAND/UL (ref 1.81–6.82)
NEUTS SEG # BLD: 7.64 THOUSAND/UL (ref 1.81–6.82)
NEUTS SEG NFR BLD AUTO: 68 % (ref 42–75)
NEUTS SEG NFR BLD AUTO: 71 % (ref 42–75)
NEUTS SEG NFR BLD AUTO: 78 % (ref 42–75)
NEUTS SEG NFR BLD AUTO: 79 % (ref 42–75)
NEUTS SEG NFR BLD AUTO: 80 % (ref 42–75)
NEUTS SEG NFR BLD AUTO: 84 % (ref 42–75)
NEUTS SEG NFR BLD AUTO: 87 % (ref 43–75)
NITRITE UR QL: NORMAL UG/ML
NORBUPRENORPHINE UR QL CFM: NEGATIVE NG/ML
NORDIAZEPAM UR QL CFM: NEGATIVE NG/ML
NORFENTANYL UR QL CFM: NEGATIVE NG/ML
NORHYDROCODONE UR QL CFM: NEGATIVE NG/ML
NORHYDROCODONE UR QL CFM: NEGATIVE NG/ML
NOROXYCODONE UR QL CFM: NORMAL NG/ML
NORTRIP UR QL CFM: NEGATIVE NG/ML
NRBC BLD AUTO-RTO: 0 /100 WBCS
OPC-3373 QUANTIFICATION: NEGATIVE
OVALOCYTES BLD QL SMEAR: PRESENT
OXAZEPAM UR QL CFM: NEGATIVE NG/ML
OXYCODONE UR QL CFM: NORMAL NG/ML
OXYMORPHONE UR QL CFM: NORMAL NG/ML
OXYMORPHONE UR QL CFM: NORMAL NG/ML
P AXIS: 104 DEGREES
P AXIS: 68 DEGREES
P AXIS: 72 DEGREES
P AXIS: 75 DEGREES
P AXIS: 81 DEGREES
P AXIS: 84 DEGREES
PCP UR QL CFM: NEGATIVE NG/ML
PHENOBARB UR QL CFM: NEGATIVE NG/ML
PHOSPHATE SERPL-MCNC: 2.1 MG/DL (ref 3–5.5)
PHOSPHATE SERPL-MCNC: 2.6 MG/DL (ref 3–5.5)
PHOSPHATE SERPL-MCNC: 3.2 MG/DL (ref 3–5.5)
PLATELET # BLD AUTO: 193 THOUSANDS/UL (ref 149–390)
PLATELET # BLD AUTO: 220 THOUSANDS/UL (ref 149–390)
PLATELET # BLD AUTO: 228 THOUSANDS/UL (ref 149–390)
PLATELET # BLD AUTO: 237 THOUSANDS/UL (ref 149–390)
PLATELET # BLD AUTO: 238 THOUSANDS/UL (ref 149–390)
PLATELET # BLD AUTO: 243 THOUSANDS/UL (ref 149–390)
PLATELET # BLD AUTO: 258 THOUSANDS/UL (ref 149–390)
PLATELET # BLD AUTO: 301 THOUSANDS/UL (ref 149–390)
PLATELET # BLD AUTO: 339 THOUSANDS/UL (ref 149–390)
PLATELET # BLD AUTO: 376 THOUSANDS/UL (ref 149–390)
PLATELET # BLD AUTO: 390 THOUSANDS/UL (ref 149–390)
PLATELET # BLD AUTO: 403 THOUSANDS/UL (ref 149–390)
PLATELET BLD QL SMEAR: ABNORMAL
PLATELET BLD QL SMEAR: ADEQUATE
PLATELET CLUMP BLD QL SMEAR: PRESENT
PMV BLD AUTO: 10.7 FL (ref 8.9–12.7)
PMV BLD AUTO: 7.3 FL (ref 8.6–11.7)
PMV BLD AUTO: 7.5 FL (ref 8.6–11.7)
PMV BLD AUTO: 7.6 FL (ref 8.6–11.7)
PMV BLD AUTO: 7.7 FL (ref 8.6–11.7)
PMV BLD AUTO: 7.8 FL (ref 8.6–11.7)
PMV BLD AUTO: 7.9 FL (ref 8.6–11.7)
PMV BLD AUTO: 8 FL (ref 8.6–11.7)
PMV BLD AUTO: 8.2 FL (ref 8.6–11.7)
PMV BLD AUTO: 8.2 FL (ref 8.6–11.7)
PMV BLD AUTO: 8.3 FL (ref 8.6–11.7)
PMV BLD AUTO: 8.3 FL (ref 8.6–11.7)
POLYCHROMASIA BLD QL SMEAR: PRESENT
POTASSIUM SERPL-SCNC: 2.9 MMOL/L (ref 3.5–5.5)
POTASSIUM SERPL-SCNC: 3 MMOL/L (ref 3.5–5.5)
POTASSIUM SERPL-SCNC: 3 MMOL/L (ref 3.5–5.5)
POTASSIUM SERPL-SCNC: 3.2 MMOL/L (ref 3.5–5.5)
POTASSIUM SERPL-SCNC: 3.3 MMOL/L (ref 3.5–5.5)
POTASSIUM SERPL-SCNC: 3.4 MMOL/L (ref 3.5–5.5)
POTASSIUM SERPL-SCNC: 3.6 MMOL/L (ref 3.5–5.5)
POTASSIUM SERPL-SCNC: 3.7 MMOL/L (ref 3.5–5.5)
POTASSIUM SERPL-SCNC: 3.9 MMOL/L (ref 3.5–5.5)
POTASSIUM SERPL-SCNC: 3.9 MMOL/L (ref 3.5–5.5)
POTASSIUM SERPL-SCNC: 4.1 MMOL/L (ref 3.5–5.3)
POTASSIUM SERPL-SCNC: 4.1 MMOL/L (ref 3.5–5.5)
POTASSIUM SERPL-SCNC: 4.2 MMOL/L (ref 3.5–5.5)
POTASSIUM SERPL-SCNC: 4.3 MMOL/L (ref 3.5–5.5)
POTASSIUM SERPL-SCNC: 4.4 MMOL/L (ref 3.5–5.3)
PR INTERVAL: 122 MS
PR INTERVAL: 126 MS
PR INTERVAL: 134 MS
PR INTERVAL: 136 MS
PR INTERVAL: 168 MS
PR INTERVAL: 184 MS
PROT SERPL-MCNC: 5.1 G/DL (ref 6.4–8.9)
PROT SERPL-MCNC: 5.6 G/DL (ref 6.4–8.9)
PROT SERPL-MCNC: 6.4 G/DL (ref 6.4–8.9)
PROTHROMBIN TIME: 12.1 SECONDS (ref 11.6–14.5)
PROTHROMBIN TIME: 14.1 SECONDS (ref 11.6–14.5)
QRS AXIS: -25 DEGREES
QRS AXIS: -33 DEGREES
QRS AXIS: -34 DEGREES
QRS AXIS: -48 DEGREES
QRS AXIS: 166 DEGREES
QRS AXIS: 168 DEGREES
QRS AXIS: 208 DEGREES
QRSD INTERVAL: 76 MS
QRSD INTERVAL: 78 MS
QRSD INTERVAL: 82 MS
QRSD INTERVAL: 84 MS
QRSD INTERVAL: 90 MS
QT INTERVAL: 256 MS
QT INTERVAL: 264 MS
QT INTERVAL: 332 MS
QT INTERVAL: 348 MS
QT INTERVAL: 354 MS
QT INTERVAL: 364 MS
QT INTERVAL: 380 MS
QTC INTERVAL: 427 MS
QTC INTERVAL: 433 MS
QTC INTERVAL: 440 MS
QTC INTERVAL: 459 MS
QTC INTERVAL: 472 MS
QTC INTERVAL: 486 MS
QTC INTERVAL: 522 MS
RBC # BLD AUTO: 3.9 MILLION/UL (ref 3.9–5.2)
RBC # BLD AUTO: 3.9 MILLION/UL (ref 3.9–5.2)
RBC # BLD AUTO: 4.26 MILLION/UL (ref 3.9–5.2)
RBC # BLD AUTO: 4.32 MILLION/UL (ref 3.9–5.2)
RBC # BLD AUTO: 4.45 MILLION/UL (ref 3.9–5.2)
RBC # BLD AUTO: 4.53 MILLION/UL (ref 3.81–5.12)
RBC # BLD AUTO: 4.53 MILLION/UL (ref 3.9–5.2)
RBC # BLD AUTO: 4.59 MILLION/UL (ref 3.9–5.2)
RBC # BLD AUTO: 4.74 MILLION/UL (ref 3.9–5.2)
RBC # BLD AUTO: 4.79 MILLION/UL (ref 3.9–5.2)
RBC # BLD AUTO: 4.96 MILLION/UL (ref 3.9–5.2)
RBC # BLD AUTO: 5.02 MILLION/UL (ref 3.9–5.2)
RBC MORPH BLD: ABNORMAL
RBC MORPH BLD: ABNORMAL
RBC MORPH BLD: NORMAL
RBC MORPH BLD: NORMAL
RESULT ALL_PRESCRIBED MEDS AND SPECIAL INSTRUCTIONS: NORMAL
RSV RNA RESP QL NAA+PROBE: NEGATIVE
RSV RNA RESP QL NAA+PROBE: NEGATIVE
SARS-COV-2 RNA RESP QL NAA+PROBE: NEGATIVE
SECOBARBITAL UR QL CFM: NEGATIVE NG/ML
SL AMB 3-METHYL-FENTANYL QUANTIFICATION: NORMAL NG/ML
SL AMB 4-ANPP QUANTIFICATION: NORMAL NG/ML
SL AMB 4-FIBF QUANTIFICATION: NORMAL NG/ML
SL AMB 7-OH-MITRAGYNINE (KRATOM ALKALOID) QUANTIFICATION: NEGATIVE NG/ML
SL AMB ACETYL FENTANYL QUANTIFICATION: NORMAL NG/ML
SL AMB ACETYL NORFENTANYL QUANTIFICATION: NORMAL NG/ML
SL AMB ACRYL FENTANYL QUANTIFICATION: NORMAL NG/ML
SL AMB BUTRYL FENTANYL QUANTIFICATION: NORMAL NG/ML
SL AMB CARFENTANIL QUANTIFICATION: NORMAL NG/ML
SL AMB CLOZAPINE QUANTIFICATION: NEGATIVE NG/ML
SL AMB CTHC (MARIJUANA METABOLITE) QUANTIFICATION: NEGATIVE NG/ML
SL AMB CYCLOPROPYL FENTANYL QUANTIFICATION: NORMAL NG/ML
SL AMB FURANYL FENTANYL QUANTIFICATION: NORMAL NG/ML
SL AMB HALOPERIDOL  QUANTIFICATION: NEGATIVE NG/ML
SL AMB HALOPERIDOL METABOLITE QUANTIFICATION: NEGATIVE NG/ML
SL AMB HYDROXYBUPROPION QUANTIFICATION: NEGATIVE NG/ML
SL AMB HYDROXYRISPERIDONE QUANTIFICATION: NEGATIVE NG/ML
SL AMB METHOXYACETYL FENTANYL QUANTIFICATION: NORMAL NG/ML
SL AMB N-DESMETHYL U-47700 QUANTIFICATION: NORMAL NG/ML
SL AMB N-DESMETHYL-TRAMADOL QUANTIFICATION: NEGATIVE NG/ML
SL AMB N-DESMETHYLCLOZAPINE QUANTIFICATION: NEGATIVE NG/ML
SL AMB PHENTERMINE QUANTIFICATION: NEGATIVE NG/ML
SL AMB POCT HEMOGLOBIN AIC: 12.6 (ref ?–6.5)
SL AMB PREGABALIN QUANTIFICATION: NEGATIVE
SL AMB RISPERIDONE QUANTIFICATION: NEGATIVE NG/ML
SL AMB U-47700 QUANTIFICATION: NORMAL NG/ML
SMOOTH MUSCLE AB TITR SER IF: NEGATIVE NG/ML
SODIUM SERPL-SCNC: 131 MMOL/L (ref 134–143)
SODIUM SERPL-SCNC: 134 MMOL/L (ref 134–143)
SODIUM SERPL-SCNC: 134 MMOL/L (ref 134–143)
SODIUM SERPL-SCNC: 135 MMOL/L (ref 134–143)
SODIUM SERPL-SCNC: 136 MMOL/L (ref 134–143)
SODIUM SERPL-SCNC: 138 MMOL/L (ref 134–143)
SODIUM SERPL-SCNC: 138 MMOL/L (ref 134–143)
SODIUM SERPL-SCNC: 139 MMOL/L (ref 134–143)
SODIUM SERPL-SCNC: 139 MMOL/L (ref 136–145)
SODIUM SERPL-SCNC: 140 MMOL/L (ref 134–143)
SODIUM SERPL-SCNC: 141 MMOL/L (ref 136–145)
SPECIMEN PH ACCEPTABLE UR: NORMAL
STOMATOCYTES BLD QL SMEAR: PRESENT
T WAVE AXIS: 34 DEGREES
T WAVE AXIS: 39 DEGREES
T WAVE AXIS: 53 DEGREES
T WAVE AXIS: 71 DEGREES
T WAVE AXIS: 73 DEGREES
T WAVE AXIS: 76 DEGREES
T WAVE AXIS: 87 DEGREES
TAPENTADOL UR QL CFM: NEGATIVE NG/ML
TARGETS BLD QL SMEAR: PRESENT
TEMAZEPAM UR QL CFM: NEGATIVE NG/ML
TEMAZEPAM UR QL CFM: NEGATIVE NG/ML
TOTAL CELLS COUNTED SPEC: 100
TRAMADOL UR QL CFM: NEGATIVE NG/ML
TROPONIN I SERPL-MCNC: 0.03 NG/ML
TROPONIN I SERPL-MCNC: <0.03 NG/ML
TSH SERPL DL<=0.05 MIU/L-ACNC: 1.07 UIU/ML (ref 0.45–5.33)
TSH SERPL DL<=0.05 MIU/L-ACNC: 2.13 UIU/ML (ref 0.45–5.33)
URATE/CREAT 24H UR: NEGATIVE NG/ML
VENTRICULAR RATE: 107 BPM
VENTRICULAR RATE: 129 BPM
VENTRICULAR RATE: 135 BPM
VENTRICULAR RATE: 162 BPM
VENTRICULAR RATE: 178 BPM
VENTRICULAR RATE: 76 BPM
VENTRICULAR RATE: 96 BPM
WBC # BLD AUTO: 5.8 THOUSAND/UL (ref 4.8–10.8)
WBC # BLD AUTO: 5.9 THOUSAND/UL (ref 4.8–10.8)
WBC # BLD AUTO: 6.2 THOUSAND/UL (ref 4.8–10.8)
WBC # BLD AUTO: 7.3 THOUSAND/UL (ref 4.8–10.8)
WBC # BLD AUTO: 7.8 THOUSAND/UL (ref 4.8–10.8)
WBC # BLD AUTO: 8.3 THOUSAND/UL (ref 4.8–10.8)
WBC # BLD AUTO: 8.3 THOUSAND/UL (ref 4.8–10.8)
WBC # BLD AUTO: 8.4 THOUSAND/UL (ref 4.8–10.8)
WBC # BLD AUTO: 9.1 THOUSAND/UL (ref 4.8–10.8)
WBC # BLD AUTO: 9.3 THOUSAND/UL (ref 4.8–10.8)
WBC # BLD AUTO: 9.5 THOUSAND/UL (ref 4.8–10.8)
WBC # BLD AUTO: 9.96 THOUSAND/UL (ref 4.31–10.16)

## 2021-01-01 PROCEDURE — 97163 PT EVAL HIGH COMPLEX 45 MIN: CPT

## 2021-01-01 PROCEDURE — 85025 COMPLETE CBC W/AUTO DIFF WBC: CPT | Performed by: PHYSICIAN ASSISTANT

## 2021-01-01 PROCEDURE — U0003 INFECTIOUS AGENT DETECTION BY NUCLEIC ACID (DNA OR RNA); SEVERE ACUTE RESPIRATORY SYNDROME CORONAVIRUS 2 (SARS-COV-2) (CORONAVIRUS DISEASE [COVID-19]), AMPLIFIED PROBE TECHNIQUE, MAKING USE OF HIGH THROUGHPUT TECHNOLOGIES AS DESCRIBED BY CMS-2020-01-R: HCPCS | Performed by: FAMILY MEDICINE

## 2021-01-01 PROCEDURE — NC001 PR NO CHARGE: Performed by: INTERNAL MEDICINE

## 2021-01-01 PROCEDURE — 99284 EMERGENCY DEPT VISIT MOD MDM: CPT

## 2021-01-01 PROCEDURE — 99232 SBSQ HOSP IP/OBS MODERATE 35: CPT | Performed by: INTERNAL MEDICINE

## 2021-01-01 PROCEDURE — U0003 INFECTIOUS AGENT DETECTION BY NUCLEIC ACID (DNA OR RNA); SEVERE ACUTE RESPIRATORY SYNDROME CORONAVIRUS 2 (SARS-COV-2) (CORONAVIRUS DISEASE [COVID-19]), AMPLIFIED PROBE TECHNIQUE, MAKING USE OF HIGH THROUGHPUT TECHNOLOGIES AS DESCRIBED BY CMS-2020-01-R: HCPCS | Performed by: HOSPITALIST

## 2021-01-01 PROCEDURE — 82948 REAGENT STRIP/BLOOD GLUCOSE: CPT

## 2021-01-01 PROCEDURE — 99214 OFFICE O/P EST MOD 30 MIN: CPT | Performed by: INTERNAL MEDICINE

## 2021-01-01 PROCEDURE — 99239 HOSP IP/OBS DSCHRG MGMT >30: CPT | Performed by: NURSE PRACTITIONER

## 2021-01-01 PROCEDURE — 83735 ASSAY OF MAGNESIUM: CPT | Performed by: NURSE PRACTITIONER

## 2021-01-01 PROCEDURE — 93005 ELECTROCARDIOGRAM TRACING: CPT

## 2021-01-01 PROCEDURE — 85610 PROTHROMBIN TIME: CPT | Performed by: EMERGENCY MEDICINE

## 2021-01-01 PROCEDURE — 94760 N-INVAS EAR/PLS OXIMETRY 1: CPT

## 2021-01-01 PROCEDURE — 99496 TRANSJ CARE MGMT HIGH F2F 7D: CPT | Performed by: INTERNAL MEDICINE

## 2021-01-01 PROCEDURE — 99214 OFFICE O/P EST MOD 30 MIN: CPT | Performed by: NURSE PRACTITIONER

## 2021-01-01 PROCEDURE — 93010 ELECTROCARDIOGRAM REPORT: CPT | Performed by: INTERNAL MEDICINE

## 2021-01-01 PROCEDURE — U0005 INFEC AGEN DETEC AMPLI PROBE: HCPCS | Performed by: FAMILY MEDICINE

## 2021-01-01 PROCEDURE — 99232 SBSQ HOSP IP/OBS MODERATE 35: CPT | Performed by: NURSE PRACTITIONER

## 2021-01-01 PROCEDURE — 97110 THERAPEUTIC EXERCISES: CPT

## 2021-01-01 PROCEDURE — 83036 HEMOGLOBIN GLYCOSYLATED A1C: CPT | Performed by: INTERNAL MEDICINE

## 2021-01-01 PROCEDURE — 80048 BASIC METABOLIC PNL TOTAL CA: CPT

## 2021-01-01 PROCEDURE — 85025 COMPLETE CBC W/AUTO DIFF WBC: CPT | Performed by: STUDENT IN AN ORGANIZED HEALTH CARE EDUCATION/TRAINING PROGRAM

## 2021-01-01 PROCEDURE — 94640 AIRWAY INHALATION TREATMENT: CPT

## 2021-01-01 PROCEDURE — 94664 DEMO&/EVAL PT USE INHALER: CPT

## 2021-01-01 PROCEDURE — 85025 COMPLETE CBC W/AUTO DIFF WBC: CPT | Performed by: EMERGENCY MEDICINE

## 2021-01-01 PROCEDURE — 99215 OFFICE O/P EST HI 40 MIN: CPT | Performed by: NURSE PRACTITIONER

## 2021-01-01 PROCEDURE — 80048 BASIC METABOLIC PNL TOTAL CA: CPT | Performed by: NURSE PRACTITIONER

## 2021-01-01 PROCEDURE — 97530 THERAPEUTIC ACTIVITIES: CPT

## 2021-01-01 PROCEDURE — 80048 BASIC METABOLIC PNL TOTAL CA: CPT | Performed by: FAMILY MEDICINE

## 2021-01-01 PROCEDURE — 36415 COLL VENOUS BLD VENIPUNCTURE: CPT | Performed by: EMERGENCY MEDICINE

## 2021-01-01 PROCEDURE — 99285 EMERGENCY DEPT VISIT HI MDM: CPT | Performed by: EMERGENCY MEDICINE

## 2021-01-01 PROCEDURE — 84443 ASSAY THYROID STIM HORMONE: CPT | Performed by: INTERNAL MEDICINE

## 2021-01-01 PROCEDURE — 1123F ACP DISCUSS/DSCN MKR DOCD: CPT | Performed by: FAMILY MEDICINE

## 2021-01-01 PROCEDURE — 80305 DRUG TEST PRSMV DIR OPT OBS: CPT | Performed by: NURSE PRACTITIONER

## 2021-01-01 PROCEDURE — 83735 ASSAY OF MAGNESIUM: CPT | Performed by: HOSPITALIST

## 2021-01-01 PROCEDURE — 99219 PR INITIAL OBSERVATION CARE/DAY 50 MINUTES: CPT | Performed by: STUDENT IN AN ORGANIZED HEALTH CARE EDUCATION/TRAINING PROGRAM

## 2021-01-01 PROCEDURE — 91303: CPT

## 2021-01-01 PROCEDURE — 80048 BASIC METABOLIC PNL TOTAL CA: CPT | Performed by: HOSPITALIST

## 2021-01-01 PROCEDURE — 71045 X-RAY EXAM CHEST 1 VIEW: CPT

## 2021-01-01 PROCEDURE — 97166 OT EVAL MOD COMPLEX 45 MIN: CPT

## 2021-01-01 PROCEDURE — 99223 1ST HOSP IP/OBS HIGH 75: CPT | Performed by: NURSE PRACTITIONER

## 2021-01-01 PROCEDURE — 84443 ASSAY THYROID STIM HORMONE: CPT | Performed by: NURSE PRACTITIONER

## 2021-01-01 PROCEDURE — 99291 CRITICAL CARE FIRST HOUR: CPT

## 2021-01-01 PROCEDURE — 73610 X-RAY EXAM OF ANKLE: CPT

## 2021-01-01 PROCEDURE — 83735 ASSAY OF MAGNESIUM: CPT | Performed by: STUDENT IN AN ORGANIZED HEALTH CARE EDUCATION/TRAINING PROGRAM

## 2021-01-01 PROCEDURE — 36415 COLL VENOUS BLD VENIPUNCTURE: CPT

## 2021-01-01 PROCEDURE — 99220 PR INITIAL OBSERVATION CARE/DAY 70 MINUTES: CPT | Performed by: INTERNAL MEDICINE

## 2021-01-01 PROCEDURE — 84484 ASSAY OF TROPONIN QUANT: CPT | Performed by: FAMILY MEDICINE

## 2021-01-01 PROCEDURE — 99239 HOSP IP/OBS DSCHRG MGMT >30: CPT | Performed by: HOSPITALIST

## 2021-01-01 PROCEDURE — 99233 SBSQ HOSP IP/OBS HIGH 50: CPT | Performed by: HOSPITALIST

## 2021-01-01 PROCEDURE — 80053 COMPREHEN METABOLIC PANEL: CPT | Performed by: INTERNAL MEDICINE

## 2021-01-01 PROCEDURE — 99285 EMERGENCY DEPT VISIT HI MDM: CPT | Performed by: PHYSICIAN ASSISTANT

## 2021-01-01 PROCEDURE — 83880 ASSAY OF NATRIURETIC PEPTIDE: CPT | Performed by: PHYSICIAN ASSISTANT

## 2021-01-01 PROCEDURE — 99213 OFFICE O/P EST LOW 20 MIN: CPT | Performed by: PHYSICIAN ASSISTANT

## 2021-01-01 PROCEDURE — 85610 PROTHROMBIN TIME: CPT | Performed by: FAMILY MEDICINE

## 2021-01-01 PROCEDURE — U0003 INFECTIOUS AGENT DETECTION BY NUCLEIC ACID (DNA OR RNA); SEVERE ACUTE RESPIRATORY SYNDROME CORONAVIRUS 2 (SARS-COV-2) (CORONAVIRUS DISEASE [COVID-19]), AMPLIFIED PROBE TECHNIQUE, MAKING USE OF HIGH THROUGHPUT TECHNOLOGIES AS DESCRIBED BY CMS-2020-01-R: HCPCS | Performed by: INTERNAL MEDICINE

## 2021-01-01 PROCEDURE — 82570 ASSAY OF URINE CREATININE: CPT | Performed by: NURSE PRACTITIONER

## 2021-01-01 PROCEDURE — 96374 THER/PROPH/DIAG INJ IV PUSH: CPT

## 2021-01-01 PROCEDURE — 99232 SBSQ HOSP IP/OBS MODERATE 35: CPT | Performed by: FAMILY MEDICINE

## 2021-01-01 PROCEDURE — G0008 ADMIN INFLUENZA VIRUS VAC: HCPCS | Performed by: NURSE PRACTITIONER

## 2021-01-01 PROCEDURE — 97116 GAIT TRAINING THERAPY: CPT

## 2021-01-01 PROCEDURE — 99232 SBSQ HOSP IP/OBS MODERATE 35: CPT | Performed by: HOSPITALIST

## 2021-01-01 PROCEDURE — 36415 COLL VENOUS BLD VENIPUNCTURE: CPT | Performed by: PHYSICIAN ASSISTANT

## 2021-01-01 PROCEDURE — 85007 BL SMEAR W/DIFF WBC COUNT: CPT

## 2021-01-01 PROCEDURE — 72100 X-RAY EXAM L-S SPINE 2/3 VWS: CPT

## 2021-01-01 PROCEDURE — 84100 ASSAY OF PHOSPHORUS: CPT | Performed by: HOSPITALIST

## 2021-01-01 PROCEDURE — 99213 OFFICE O/P EST LOW 20 MIN: CPT | Performed by: NURSE PRACTITIONER

## 2021-01-01 PROCEDURE — 99232 SBSQ HOSP IP/OBS MODERATE 35: CPT | Performed by: STUDENT IN AN ORGANIZED HEALTH CARE EDUCATION/TRAINING PROGRAM

## 2021-01-01 PROCEDURE — 97167 OT EVAL HIGH COMPLEX 60 MIN: CPT

## 2021-01-01 PROCEDURE — 0241U HB NFCT DS VIR RESP RNA 4 TRGT: CPT | Performed by: PHYSICIAN ASSISTANT

## 2021-01-01 PROCEDURE — 85730 THROMBOPLASTIN TIME PARTIAL: CPT | Performed by: EMERGENCY MEDICINE

## 2021-01-01 PROCEDURE — 80053 COMPREHEN METABOLIC PANEL: CPT | Performed by: PHYSICIAN ASSISTANT

## 2021-01-01 PROCEDURE — 0241U HB NFCT DS VIR RESP RNA 4 TRGT: CPT | Performed by: INTERNAL MEDICINE

## 2021-01-01 PROCEDURE — 12002 RPR S/N/AX/GEN/TRNK2.6-7.5CM: CPT | Performed by: PHYSICIAN ASSISTANT

## 2021-01-01 PROCEDURE — 0031A: CPT

## 2021-01-01 PROCEDURE — 80053 COMPREHEN METABOLIC PANEL: CPT | Performed by: EMERGENCY MEDICINE

## 2021-01-01 PROCEDURE — 84484 ASSAY OF TROPONIN QUANT: CPT | Performed by: NURSE PRACTITIONER

## 2021-01-01 PROCEDURE — 85025 COMPLETE CBC W/AUTO DIFF WBC: CPT | Performed by: NURSE PRACTITIONER

## 2021-01-01 PROCEDURE — 93306 TTE W/DOPPLER COMPLETE: CPT

## 2021-01-01 PROCEDURE — 36415 COLL VENOUS BLD VENIPUNCTURE: CPT | Performed by: NURSE PRACTITIONER

## 2021-01-01 PROCEDURE — 84484 ASSAY OF TROPONIN QUANT: CPT | Performed by: EMERGENCY MEDICINE

## 2021-01-01 PROCEDURE — 85027 COMPLETE CBC AUTOMATED: CPT | Performed by: FAMILY MEDICINE

## 2021-01-01 PROCEDURE — 96365 THER/PROPH/DIAG IV INF INIT: CPT

## 2021-01-01 PROCEDURE — 88305 TISSUE EXAM BY PATHOLOGIST: CPT | Performed by: PATHOLOGY

## 2021-01-01 PROCEDURE — 27096 INJECT SACROILIAC JOINT: CPT | Performed by: ANESTHESIOLOGY

## 2021-01-01 PROCEDURE — 80048 BASIC METABOLIC PNL TOTAL CA: CPT | Performed by: INTERNAL MEDICINE

## 2021-01-01 PROCEDURE — 99285 EMERGENCY DEPT VISIT HI MDM: CPT

## 2021-01-01 PROCEDURE — 85027 COMPLETE CBC AUTOMATED: CPT | Performed by: NURSE PRACTITIONER

## 2021-01-01 PROCEDURE — 83880 ASSAY OF NATRIURETIC PEPTIDE: CPT | Performed by: EMERGENCY MEDICINE

## 2021-01-01 PROCEDURE — 82043 UR ALBUMIN QUANTITATIVE: CPT | Performed by: NURSE PRACTITIONER

## 2021-01-01 PROCEDURE — 85027 COMPLETE CBC AUTOMATED: CPT | Performed by: HOSPITALIST

## 2021-01-01 PROCEDURE — 96372 THER/PROPH/DIAG INJ SC/IM: CPT | Performed by: INTERNAL MEDICINE

## 2021-01-01 PROCEDURE — 85027 COMPLETE CBC AUTOMATED: CPT

## 2021-01-01 PROCEDURE — 85027 COMPLETE CBC AUTOMATED: CPT | Performed by: PHYSICIAN ASSISTANT

## 2021-01-01 PROCEDURE — 80048 BASIC METABOLIC PNL TOTAL CA: CPT | Performed by: STUDENT IN AN ORGANIZED HEALTH CARE EDUCATION/TRAINING PROGRAM

## 2021-01-01 PROCEDURE — 83880 ASSAY OF NATRIURETIC PEPTIDE: CPT | Performed by: FAMILY MEDICINE

## 2021-01-01 PROCEDURE — U0005 INFEC AGEN DETEC AMPLI PROBE: HCPCS | Performed by: HOSPITALIST

## 2021-01-01 PROCEDURE — 93306 TTE W/DOPPLER COMPLETE: CPT | Performed by: INTERNAL MEDICINE

## 2021-01-01 PROCEDURE — 99291 CRITICAL CARE FIRST HOUR: CPT | Performed by: FAMILY MEDICINE

## 2021-01-01 PROCEDURE — 36415 COLL VENOUS BLD VENIPUNCTURE: CPT | Performed by: FAMILY MEDICINE

## 2021-01-01 PROCEDURE — G0439 PPPS, SUBSEQ VISIT: HCPCS | Performed by: NURSE PRACTITIONER

## 2021-01-01 PROCEDURE — 99239 HOSP IP/OBS DSCHRG MGMT >30: CPT | Performed by: INTERNAL MEDICINE

## 2021-01-01 PROCEDURE — 83036 HEMOGLOBIN GLYCOSYLATED A1C: CPT

## 2021-01-01 PROCEDURE — 85007 BL SMEAR W/DIFF WBC COUNT: CPT | Performed by: PHYSICIAN ASSISTANT

## 2021-01-01 PROCEDURE — 85007 BL SMEAR W/DIFF WBC COUNT: CPT | Performed by: FAMILY MEDICINE

## 2021-01-01 PROCEDURE — 96375 TX/PRO/DX INJ NEW DRUG ADDON: CPT

## 2021-01-01 PROCEDURE — C1726 CATH, BAL DIL, NON-VASCULAR: HCPCS

## 2021-01-01 PROCEDURE — U0005 INFEC AGEN DETEC AMPLI PROBE: HCPCS | Performed by: INTERNAL MEDICINE

## 2021-01-01 PROCEDURE — 87081 CULTURE SCREEN ONLY: CPT | Performed by: NURSE PRACTITIONER

## 2021-01-01 PROCEDURE — 85027 COMPLETE CBC AUTOMATED: CPT | Performed by: INTERNAL MEDICINE

## 2021-01-01 PROCEDURE — 90662 IIV NO PRSV INCREASED AG IM: CPT | Performed by: NURSE PRACTITIONER

## 2021-01-01 PROCEDURE — 84100 ASSAY OF PHOSPHORUS: CPT | Performed by: STUDENT IN AN ORGANIZED HEALTH CARE EDUCATION/TRAINING PROGRAM

## 2021-01-01 PROCEDURE — G0463 HOSPITAL OUTPT CLINIC VISIT: HCPCS | Performed by: PHYSICIAN ASSISTANT

## 2021-01-01 PROCEDURE — 99024 POSTOP FOLLOW-UP VISIT: CPT | Performed by: NURSE PRACTITIONER

## 2021-01-01 PROCEDURE — 99222 1ST HOSP IP/OBS MODERATE 55: CPT | Performed by: NURSE PRACTITIONER

## 2021-01-01 PROCEDURE — 80048 BASIC METABOLIC PNL TOTAL CA: CPT | Performed by: PHYSICIAN ASSISTANT

## 2021-01-01 RX ORDER — IPRATROPIUM BROMIDE AND ALBUTEROL SULFATE 2.5; .5 MG/3ML; MG/3ML
3 SOLUTION RESPIRATORY (INHALATION)
Status: DISCONTINUED | OUTPATIENT
Start: 2021-01-01 | End: 2021-01-01

## 2021-01-01 RX ORDER — LEVALBUTEROL 1.25 MG/.5ML
1.25 SOLUTION, CONCENTRATE RESPIRATORY (INHALATION)
Status: DISCONTINUED | OUTPATIENT
Start: 2021-01-01 | End: 2021-01-01 | Stop reason: HOSPADM

## 2021-01-01 RX ORDER — FUROSEMIDE 10 MG/ML
40 INJECTION INTRAMUSCULAR; INTRAVENOUS DAILY
Status: COMPLETED | OUTPATIENT
Start: 2021-01-01 | End: 2021-01-01

## 2021-01-01 RX ORDER — POTASSIUM CHLORIDE 20 MEQ/1
40 TABLET, EXTENDED RELEASE ORAL ONCE
Status: COMPLETED | OUTPATIENT
Start: 2021-01-01 | End: 2021-01-01

## 2021-01-01 RX ORDER — METHOCARBAMOL 500 MG/1
500 TABLET, FILM COATED ORAL EVERY 8 HOURS SCHEDULED
Status: DISCONTINUED | OUTPATIENT
Start: 2021-01-01 | End: 2021-01-01 | Stop reason: HOSPADM

## 2021-01-01 RX ORDER — IPRATROPIUM BROMIDE 17 UG/1
2 AEROSOL, METERED RESPIRATORY (INHALATION) EVERY 6 HOURS
Qty: 38.7 G | Refills: 5 | Status: SHIPPED | OUTPATIENT
Start: 2021-01-01 | End: 2022-01-01 | Stop reason: SDUPTHER

## 2021-01-01 RX ORDER — OMEPRAZOLE 40 MG/1
40 CAPSULE, DELAYED RELEASE ORAL EVERY MORNING
COMMUNITY
Start: 2021-01-01 | End: 2022-01-01 | Stop reason: HOSPADM

## 2021-01-01 RX ORDER — PANTOPRAZOLE SODIUM 40 MG/1
40 TABLET, DELAYED RELEASE ORAL 2 TIMES DAILY
Status: DISCONTINUED | OUTPATIENT
Start: 2021-01-01 | End: 2021-01-01 | Stop reason: HOSPADM

## 2021-01-01 RX ORDER — OXYCODONE HYDROCHLORIDE AND ACETAMINOPHEN 5; 325 MG/1; MG/1
1 TABLET ORAL EVERY 6 HOURS PRN
Status: DISCONTINUED | OUTPATIENT
Start: 2021-01-01 | End: 2021-01-02 | Stop reason: HOSPADM

## 2021-01-01 RX ORDER — POLYETHYLENE GLYCOL 3350 17 G/17G
17 POWDER, FOR SOLUTION ORAL DAILY
Status: DISCONTINUED | OUTPATIENT
Start: 2021-01-01 | End: 2021-01-01 | Stop reason: HOSPADM

## 2021-01-01 RX ORDER — FUROSEMIDE 40 MG/1
40 TABLET ORAL 2 TIMES DAILY
Status: DISCONTINUED | OUTPATIENT
Start: 2021-01-01 | End: 2021-01-01 | Stop reason: HOSPADM

## 2021-01-01 RX ORDER — POTASSIUM CHLORIDE 20 MEQ/1
20 TABLET, EXTENDED RELEASE ORAL 2 TIMES DAILY
Status: COMPLETED | OUTPATIENT
Start: 2021-01-01 | End: 2021-01-01

## 2021-01-01 RX ORDER — OXYCODONE HYDROCHLORIDE AND ACETAMINOPHEN 5; 325 MG/1; MG/1
TABLET ORAL
Qty: 120 TABLET | Refills: 0 | Status: SHIPPED | OUTPATIENT
Start: 2021-01-01 | End: 2022-01-01

## 2021-01-01 RX ORDER — PREDNISONE 10 MG/1
10 TABLET ORAL 2 TIMES DAILY WITH MEALS
Qty: 60 TABLET | Refills: 5 | Status: SHIPPED | OUTPATIENT
Start: 2021-01-01 | End: 2021-01-01 | Stop reason: SDUPTHER

## 2021-01-01 RX ORDER — FUROSEMIDE 10 MG/ML
60 INJECTION INTRAMUSCULAR; INTRAVENOUS
Status: DISCONTINUED | OUTPATIENT
Start: 2021-01-01 | End: 2021-01-02 | Stop reason: HOSPADM

## 2021-01-01 RX ORDER — MONTELUKAST SODIUM 10 MG/1
10 TABLET ORAL
Status: DISCONTINUED | OUTPATIENT
Start: 2021-01-01 | End: 2021-01-01 | Stop reason: HOSPADM

## 2021-01-01 RX ORDER — METOPROLOL SUCCINATE 25 MG/1
75 TABLET, EXTENDED RELEASE ORAL EVERY MORNING
Qty: 90 TABLET | Refills: 0 | Status: SHIPPED | OUTPATIENT
Start: 2021-01-01 | End: 2021-01-01

## 2021-01-01 RX ORDER — MUSCLE RUB CREAM 100; 150 MG/G; MG/G
CREAM TOPICAL 4 TIMES DAILY PRN
Status: DISCONTINUED | OUTPATIENT
Start: 2021-01-01 | End: 2021-01-01 | Stop reason: HOSPADM

## 2021-01-01 RX ORDER — METHYLPREDNISOLONE SODIUM SUCCINATE 125 MG/2ML
125 INJECTION, POWDER, LYOPHILIZED, FOR SOLUTION INTRAMUSCULAR; INTRAVENOUS ONCE
Status: COMPLETED | OUTPATIENT
Start: 2021-01-01 | End: 2021-01-01

## 2021-01-01 RX ORDER — METHOCARBAMOL 500 MG/1
500 TABLET, FILM COATED ORAL EVERY 8 HOURS SCHEDULED
Status: DISCONTINUED | OUTPATIENT
Start: 2021-01-01 | End: 2021-01-01

## 2021-01-01 RX ORDER — FUROSEMIDE 40 MG/1
40 TABLET ORAL
Status: DISCONTINUED | OUTPATIENT
Start: 2021-01-01 | End: 2021-01-01

## 2021-01-01 RX ORDER — ALBUTEROL SULFATE 2.5 MG/3ML
2.5 SOLUTION RESPIRATORY (INHALATION) ONCE
Status: DISCONTINUED | OUTPATIENT
Start: 2021-01-01 | End: 2021-01-01

## 2021-01-01 RX ORDER — AMIODARONE HYDROCHLORIDE 100 MG/1
200 TABLET ORAL 2 TIMES DAILY WITH MEALS
Status: DISCONTINUED | OUTPATIENT
Start: 2021-01-01 | End: 2021-01-01 | Stop reason: HOSPADM

## 2021-01-01 RX ORDER — METHOCARBAMOL 500 MG/1
500 TABLET, FILM COATED ORAL EVERY 8 HOURS SCHEDULED
Qty: 21 TABLET | Refills: 0 | Status: SHIPPED | OUTPATIENT
Start: 2021-01-01 | End: 2021-01-01

## 2021-01-01 RX ORDER — PANTOPRAZOLE SODIUM 40 MG/1
TABLET, DELAYED RELEASE ORAL
Qty: 60 TABLET | Refills: 5 | Status: SHIPPED | OUTPATIENT
Start: 2021-01-01 | End: 2021-01-01

## 2021-01-01 RX ORDER — MAGNESIUM SULFATE HEPTAHYDRATE 40 MG/ML
2 INJECTION, SOLUTION INTRAVENOUS ONCE
Status: COMPLETED | OUTPATIENT
Start: 2021-01-01 | End: 2021-01-01

## 2021-01-01 RX ORDER — LEVALBUTEROL 1.25 MG/.5ML
1.25 SOLUTION, CONCENTRATE RESPIRATORY (INHALATION)
Status: DISCONTINUED | OUTPATIENT
Start: 2021-01-01 | End: 2021-01-01 | Stop reason: CLARIF

## 2021-01-01 RX ORDER — BUDESONIDE AND FORMOTEROL FUMARATE DIHYDRATE 160; 4.5 UG/1; UG/1
2 AEROSOL RESPIRATORY (INHALATION) 2 TIMES DAILY
Status: DISCONTINUED | OUTPATIENT
Start: 2021-01-01 | End: 2021-01-01 | Stop reason: HOSPADM

## 2021-01-01 RX ORDER — INSULIN GLARGINE 100 [IU]/ML
10 INJECTION, SOLUTION SUBCUTANEOUS
Status: DISCONTINUED | OUTPATIENT
Start: 2021-01-01 | End: 2021-01-01

## 2021-01-01 RX ORDER — OXYCODONE HYDROCHLORIDE AND ACETAMINOPHEN 5; 325 MG/1; MG/1
1 TABLET ORAL EVERY 6 HOURS PRN
Qty: 120 TABLET | Refills: 0 | Status: SHIPPED | OUTPATIENT
Start: 2021-01-01 | End: 2022-01-01 | Stop reason: HOSPADM

## 2021-01-01 RX ORDER — FLUTICASONE FUROATE, UMECLIDINIUM BROMIDE AND VILANTEROL TRIFENATATE 100; 62.5; 25 UG/1; UG/1; UG/1
1 POWDER RESPIRATORY (INHALATION) DAILY
Qty: 180 EACH | Refills: 4 | Status: SHIPPED | OUTPATIENT
Start: 2021-01-01 | End: 2021-01-01 | Stop reason: SDUPTHER

## 2021-01-01 RX ORDER — LIDOCAINE 50 MG/G
1 PATCH TOPICAL DAILY
Status: DISCONTINUED | OUTPATIENT
Start: 2021-01-01 | End: 2021-01-01 | Stop reason: HOSPADM

## 2021-01-01 RX ORDER — FLUTICASONE FUROATE, UMECLIDINIUM BROMIDE AND VILANTEROL TRIFENATATE 100; 62.5; 25 UG/1; UG/1; UG/1
1 POWDER RESPIRATORY (INHALATION) DAILY
Qty: 60 EACH | Refills: 5 | Status: SHIPPED | OUTPATIENT
Start: 2021-01-01 | End: 2021-01-01 | Stop reason: SDUPTHER

## 2021-01-01 RX ORDER — IPRATROPIUM BROMIDE AND ALBUTEROL SULFATE 2.5; .5 MG/3ML; MG/3ML
3 SOLUTION RESPIRATORY (INHALATION)
Qty: 600 ML | Refills: 7 | Status: SHIPPED | OUTPATIENT
Start: 2021-01-01 | End: 2021-01-01 | Stop reason: SDUPTHER

## 2021-01-01 RX ORDER — ACETAMINOPHEN 325 MG/1
650 TABLET ORAL EVERY 4 HOURS PRN
Status: DISCONTINUED | OUTPATIENT
Start: 2021-01-01 | End: 2021-01-01 | Stop reason: HOSPADM

## 2021-01-01 RX ORDER — METOPROLOL SUCCINATE 50 MG/1
50 TABLET, EXTENDED RELEASE ORAL
Qty: 30 TABLET | Refills: 0 | Status: SHIPPED | OUTPATIENT
Start: 2021-01-01 | End: 2021-01-01 | Stop reason: SDUPTHER

## 2021-01-01 RX ORDER — SODIUM CHLORIDE, SODIUM LACTATE, POTASSIUM CHLORIDE, CALCIUM CHLORIDE 600; 310; 30; 20 MG/100ML; MG/100ML; MG/100ML; MG/100ML
125 INJECTION, SOLUTION INTRAVENOUS CONTINUOUS
Status: DISCONTINUED | OUTPATIENT
Start: 2021-01-01 | End: 2021-01-01 | Stop reason: HOSPADM

## 2021-01-01 RX ORDER — DILTIAZEM HYDROCHLORIDE 120 MG/1
120 CAPSULE, COATED, EXTENDED RELEASE ORAL DAILY
Status: DISCONTINUED | OUTPATIENT
Start: 2021-01-01 | End: 2021-01-01 | Stop reason: HOSPADM

## 2021-01-01 RX ORDER — ACETAMINOPHEN 325 MG/1
975 TABLET ORAL EVERY 6 HOURS PRN
Status: DISCONTINUED | OUTPATIENT
Start: 2021-01-01 | End: 2021-01-02 | Stop reason: HOSPADM

## 2021-01-01 RX ORDER — METOPROLOL SUCCINATE 25 MG/1
75 TABLET, EXTENDED RELEASE ORAL EVERY MORNING
Qty: 90 TABLET | Refills: 5 | Status: SHIPPED | OUTPATIENT
Start: 2021-01-01 | End: 2022-01-01 | Stop reason: HOSPADM

## 2021-01-01 RX ORDER — CEPHALEXIN 500 MG/1
500 CAPSULE ORAL EVERY 8 HOURS SCHEDULED
Qty: 21 CAPSULE | Refills: 0 | Status: SHIPPED | OUTPATIENT
Start: 2021-01-01 | End: 2021-01-01

## 2021-01-01 RX ORDER — GABAPENTIN 300 MG/1
300 CAPSULE ORAL 2 TIMES DAILY
Status: DISCONTINUED | OUTPATIENT
Start: 2021-01-01 | End: 2021-01-01

## 2021-01-01 RX ORDER — POTASSIUM CHLORIDE 750 MG/1
10 TABLET, EXTENDED RELEASE ORAL 2 TIMES DAILY
Status: DISCONTINUED | OUTPATIENT
Start: 2021-01-01 | End: 2021-01-01 | Stop reason: HOSPADM

## 2021-01-01 RX ORDER — DILTIAZEM HYDROCHLORIDE 5 MG/ML
15 INJECTION INTRAVENOUS ONCE
Status: COMPLETED | OUTPATIENT
Start: 2021-01-01 | End: 2021-01-01

## 2021-01-01 RX ORDER — MONTELUKAST SODIUM 10 MG/1
10 TABLET ORAL
Qty: 90 TABLET | Refills: 1 | Status: SHIPPED | OUTPATIENT
Start: 2021-01-01 | End: 2022-01-01 | Stop reason: HOSPADM

## 2021-01-01 RX ORDER — IRON POLYSACCHARIDE COMPLEX 150 MG
150 CAPSULE ORAL DAILY
Status: DISCONTINUED | OUTPATIENT
Start: 2021-01-01 | End: 2021-01-01 | Stop reason: HOSPADM

## 2021-01-01 RX ORDER — DILTIAZEM HYDROCHLORIDE 60 MG/1
60 TABLET, FILM COATED ORAL EVERY 6 HOURS SCHEDULED
Status: DISCONTINUED | OUTPATIENT
Start: 2021-01-01 | End: 2021-01-01

## 2021-01-01 RX ORDER — HYDROMORPHONE HCL IN WATER/PF 6 MG/30 ML
0.2 PATIENT CONTROLLED ANALGESIA SYRINGE INTRAVENOUS ONCE
Status: COMPLETED | OUTPATIENT
Start: 2021-01-01 | End: 2021-01-01

## 2021-01-01 RX ORDER — DOXYCYCLINE HYCLATE 100 MG
100 TABLET ORAL 2 TIMES DAILY
Qty: 14 TABLET | Refills: 0 | Status: SHIPPED | OUTPATIENT
Start: 2021-01-01 | End: 2021-01-01

## 2021-01-01 RX ORDER — METHOCARBAMOL 500 MG/1
500 TABLET, FILM COATED ORAL EVERY 6 HOURS PRN
Status: DISCONTINUED | OUTPATIENT
Start: 2021-01-01 | End: 2021-01-01 | Stop reason: HOSPADM

## 2021-01-01 RX ORDER — PREDNISONE 1 MG/1
5 TABLET ORAL 2 TIMES DAILY WITH MEALS
Status: DISCONTINUED | OUTPATIENT
Start: 2021-01-01 | End: 2021-01-01 | Stop reason: HOSPADM

## 2021-01-01 RX ORDER — PREDNISONE 1 MG/1
TABLET ORAL
Qty: 60 TABLET | Refills: 5 | Status: SHIPPED | OUTPATIENT
Start: 2021-01-01 | End: 2022-01-01 | Stop reason: HOSPADM

## 2021-01-01 RX ORDER — POTASSIUM CHLORIDE 20MEQ/15ML
20 LIQUID (ML) ORAL
Status: COMPLETED | OUTPATIENT
Start: 2021-01-01 | End: 2021-01-01

## 2021-01-01 RX ORDER — GABAPENTIN 300 MG/1
300 CAPSULE ORAL 3 TIMES DAILY
Status: DISCONTINUED | OUTPATIENT
Start: 2021-01-01 | End: 2021-01-01 | Stop reason: HOSPADM

## 2021-01-01 RX ORDER — SITAGLIPTIN 50 MG/1
TABLET, FILM COATED ORAL
Qty: 30 TABLET | Refills: 5 | Status: SHIPPED | OUTPATIENT
Start: 2021-01-01 | End: 2022-01-01 | Stop reason: HOSPADM

## 2021-01-01 RX ORDER — OXYCODONE HYDROCHLORIDE AND ACETAMINOPHEN 5; 325 MG/1; MG/1
1 TABLET ORAL EVERY 6 HOURS PRN
Status: DISCONTINUED | OUTPATIENT
Start: 2021-01-01 | End: 2021-01-01 | Stop reason: HOSPADM

## 2021-01-01 RX ORDER — LEVALBUTEROL INHALATION SOLUTION 0.63 MG/3ML
1.25 SOLUTION RESPIRATORY (INHALATION)
Status: DISCONTINUED | OUTPATIENT
Start: 2021-01-01 | End: 2021-01-01

## 2021-01-01 RX ORDER — METOPROLOL SUCCINATE 25 MG/1
75 TABLET, EXTENDED RELEASE ORAL EVERY MORNING
Qty: 90 TABLET | Refills: 0 | Status: SHIPPED | OUTPATIENT
Start: 2021-01-01 | End: 2021-01-01 | Stop reason: SDUPTHER

## 2021-01-01 RX ORDER — LIDOCAINE HYDROCHLORIDE 10 MG/ML
INJECTION, SOLUTION EPIDURAL; INFILTRATION; INTRACAUDAL; PERINEURAL AS NEEDED
Status: DISCONTINUED | OUTPATIENT
Start: 2021-01-01 | End: 2021-01-01

## 2021-01-01 RX ORDER — INSULIN GLARGINE 100 [IU]/ML
25 INJECTION, SOLUTION SUBCUTANEOUS
Status: DISCONTINUED | OUTPATIENT
Start: 2021-01-01 | End: 2021-01-01

## 2021-01-01 RX ORDER — HYDROMORPHONE HCL/PF 1 MG/ML
0.5 SYRINGE (ML) INJECTION EVERY 4 HOURS PRN
Status: DISCONTINUED | OUTPATIENT
Start: 2021-01-01 | End: 2021-01-01 | Stop reason: HOSPADM

## 2021-01-01 RX ORDER — GABAPENTIN 300 MG/1
300 CAPSULE ORAL 2 TIMES DAILY
Start: 2021-01-01 | End: 2021-01-01 | Stop reason: HOSPADM

## 2021-01-01 RX ORDER — DOXYCYCLINE HYCLATE 100 MG/1
100 CAPSULE ORAL DAILY
Qty: 30 CAPSULE | Refills: 2 | Status: SHIPPED | OUTPATIENT
Start: 2021-01-01 | End: 2021-01-01

## 2021-01-01 RX ORDER — DOXYCYCLINE HYCLATE 100 MG/1
100 CAPSULE ORAL DAILY
Qty: 30 CAPSULE | Refills: 0 | Status: SHIPPED | OUTPATIENT
Start: 2021-01-01 | End: 2021-01-01

## 2021-01-01 RX ORDER — METOPROLOL SUCCINATE 50 MG/1
50 TABLET, EXTENDED RELEASE ORAL
Status: DISCONTINUED | OUTPATIENT
Start: 2021-01-01 | End: 2021-01-01 | Stop reason: HOSPADM

## 2021-01-01 RX ORDER — ACETAMINOPHEN 325 MG/1
650 TABLET ORAL EVERY 4 HOURS PRN
COMMUNITY
End: 2022-01-01 | Stop reason: HOSPADM

## 2021-01-01 RX ORDER — HYDROCODONE BITARTRATE AND ACETAMINOPHEN 5; 325 MG/1; MG/1
1 TABLET ORAL EVERY 6 HOURS PRN
Qty: 90 TABLET | Refills: 0 | Status: SHIPPED | OUTPATIENT
Start: 2021-01-01 | End: 2021-01-01 | Stop reason: ALTCHOICE

## 2021-01-01 RX ORDER — OMEGA-3S/DHA/EPA/FISH OIL/D3 300MG-1000
400 CAPSULE ORAL DAILY
COMMUNITY
End: 2022-01-01 | Stop reason: HOSPADM

## 2021-01-01 RX ORDER — HYDROXYZINE HYDROCHLORIDE 25 MG/1
25 TABLET, FILM COATED ORAL EVERY 6 HOURS PRN
Qty: 30 TABLET | Refills: 0 | Status: SHIPPED | OUTPATIENT
Start: 2021-01-01 | End: 2021-01-01 | Stop reason: SDUPTHER

## 2021-01-01 RX ORDER — OXYCODONE HYDROCHLORIDE AND ACETAMINOPHEN 5; 325 MG/1; MG/1
1 TABLET ORAL EVERY 6 HOURS PRN
Status: DISCONTINUED | OUTPATIENT
Start: 2021-01-01 | End: 2021-01-01

## 2021-01-01 RX ORDER — OXYCODONE HYDROCHLORIDE AND ACETAMINOPHEN 5; 325 MG/1; MG/1
1 TABLET ORAL EVERY 6 HOURS PRN
Qty: 120 TABLET | Refills: 0 | Status: SHIPPED | OUTPATIENT
Start: 2021-01-01 | End: 2021-01-01 | Stop reason: SDUPTHER

## 2021-01-01 RX ORDER — IPRATROPIUM BROMIDE AND ALBUTEROL SULFATE 2.5; .5 MG/3ML; MG/3ML
SOLUTION RESPIRATORY (INHALATION)
Qty: 360 ML | Refills: 7 | Status: SHIPPED | OUTPATIENT
Start: 2021-01-01 | End: 2022-01-01 | Stop reason: HOSPADM

## 2021-01-01 RX ORDER — SIMETHICONE 80 MG
80 TABLET,CHEWABLE ORAL EVERY 6 HOURS PRN
Status: DISCONTINUED | OUTPATIENT
Start: 2021-01-01 | End: 2021-01-01 | Stop reason: HOSPADM

## 2021-01-01 RX ORDER — GABAPENTIN 300 MG/1
CAPSULE ORAL
Qty: 90 CAPSULE | Refills: 2 | Status: SHIPPED | OUTPATIENT
Start: 2021-01-01 | End: 2021-01-01

## 2021-01-01 RX ORDER — DILTIAZEM HYDROCHLORIDE 120 MG/1
120 CAPSULE, COATED, EXTENDED RELEASE ORAL DAILY
Qty: 30 CAPSULE | Refills: 5 | Status: SHIPPED | OUTPATIENT
Start: 2021-01-01 | End: 2021-01-01

## 2021-01-01 RX ORDER — AMIODARONE HYDROCHLORIDE 200 MG/1
200 TABLET ORAL 2 TIMES DAILY WITH MEALS
Qty: 17 TABLET | Refills: 0 | Status: SHIPPED | OUTPATIENT
Start: 2021-01-01 | End: 2021-01-01

## 2021-01-01 RX ORDER — AMIODARONE HYDROCHLORIDE 200 MG/1
200 TABLET ORAL 2 TIMES DAILY WITH MEALS
Qty: 17 TABLET | Refills: 0 | Status: SHIPPED | OUTPATIENT
Start: 2021-01-01 | End: 2021-01-01 | Stop reason: SDUPTHER

## 2021-01-01 RX ORDER — GABAPENTIN 300 MG/1
300 CAPSULE ORAL 2 TIMES DAILY
Start: 2021-01-01 | End: 2021-01-01 | Stop reason: SDUPTHER

## 2021-01-01 RX ORDER — ALBUTEROL SULFATE 2.5 MG/3ML
2.5 SOLUTION RESPIRATORY (INHALATION) EVERY 4 HOURS PRN
Status: DISCONTINUED | OUTPATIENT
Start: 2021-01-01 | End: 2021-01-01 | Stop reason: HOSPADM

## 2021-01-01 RX ORDER — METOPROLOL SUCCINATE 50 MG/1
TABLET, EXTENDED RELEASE ORAL
Qty: 30 TABLET | Refills: 5 | Status: SHIPPED | OUTPATIENT
Start: 2021-01-01 | End: 2021-01-01 | Stop reason: HOSPADM

## 2021-01-01 RX ORDER — HYDROMORPHONE HCL IN WATER/PF 6 MG/30 ML
0.2 PATIENT CONTROLLED ANALGESIA SYRINGE INTRAVENOUS EVERY 4 HOURS PRN
Status: DISCONTINUED | OUTPATIENT
Start: 2021-01-01 | End: 2021-01-01 | Stop reason: HOSPADM

## 2021-01-01 RX ORDER — HYDROMORPHONE HCL/PF 1 MG/ML
0.5 SYRINGE (ML) INJECTION ONCE
Status: COMPLETED | OUTPATIENT
Start: 2021-01-01 | End: 2021-01-01

## 2021-01-01 RX ORDER — AMIODARONE HYDROCHLORIDE 100 MG/1
200 TABLET ORAL
Status: DISCONTINUED | OUTPATIENT
Start: 2021-01-01 | End: 2021-01-01 | Stop reason: HOSPADM

## 2021-01-01 RX ORDER — PREDNISONE 1 MG/1
5 TABLET ORAL DAILY PRN
Qty: 60 TABLET | Refills: 5
Start: 2021-01-01 | End: 2021-01-01

## 2021-01-01 RX ORDER — OXYCODONE HYDROCHLORIDE AND ACETAMINOPHEN 5; 325 MG/1; MG/1
1 TABLET ORAL EVERY 6 HOURS PRN
Qty: 120 TABLET | Refills: 0 | Status: SHIPPED | OUTPATIENT
Start: 2021-01-01 | End: 2021-01-01

## 2021-01-01 RX ORDER — SODIUM CHLORIDE FOR INHALATION 0.9 %
3 VIAL, NEBULIZER (ML) INHALATION
Status: DISCONTINUED | OUTPATIENT
Start: 2021-01-01 | End: 2021-01-01 | Stop reason: SDUPTHER

## 2021-01-01 RX ORDER — ALENDRONATE SODIUM 70 MG/1
70 TABLET ORAL
Status: DISCONTINUED | OUTPATIENT
Start: 2021-01-01 | End: 2021-01-01 | Stop reason: HOSPADM

## 2021-01-01 RX ORDER — INSULIN GLARGINE 100 [IU]/ML
10 INJECTION, SOLUTION SUBCUTANEOUS EVERY 12 HOURS SCHEDULED
Status: DISCONTINUED | OUTPATIENT
Start: 2021-01-01 | End: 2021-01-01 | Stop reason: HOSPADM

## 2021-01-01 RX ORDER — PREDNISONE 20 MG/1
20 TABLET ORAL 2 TIMES DAILY WITH MEALS
Qty: 30 TABLET | Refills: 5 | Status: SHIPPED | OUTPATIENT
Start: 2021-01-01 | End: 2021-01-01 | Stop reason: SDUPTHER

## 2021-01-01 RX ORDER — EPHEDRINE SULFATE 50 MG/ML
INJECTION INTRAVENOUS AS NEEDED
Status: DISCONTINUED | OUTPATIENT
Start: 2021-01-01 | End: 2021-01-01

## 2021-01-01 RX ORDER — FUROSEMIDE 20 MG/1
TABLET ORAL
Qty: 120 TABLET | Refills: 5 | Status: SHIPPED | OUTPATIENT
Start: 2021-01-01 | End: 2022-01-01 | Stop reason: HOSPADM

## 2021-01-01 RX ORDER — DILTIAZEM HYDROCHLORIDE 120 MG/1
120 CAPSULE, COATED, EXTENDED RELEASE ORAL DAILY
Qty: 30 CAPSULE | Refills: 0 | Status: SHIPPED | OUTPATIENT
Start: 2021-01-01 | End: 2021-01-01 | Stop reason: SDUPTHER

## 2021-01-01 RX ORDER — IPRATROPIUM BROMIDE AND ALBUTEROL SULFATE 2.5; .5 MG/3ML; MG/3ML
3 SOLUTION RESPIRATORY (INHALATION)
Qty: 300 ML | Refills: 5 | Status: SHIPPED | OUTPATIENT
Start: 2021-01-01 | End: 2021-01-01 | Stop reason: SDUPTHER

## 2021-01-01 RX ORDER — SODIUM CHLORIDE FOR INHALATION 0.9 %
3 VIAL, NEBULIZER (ML) INHALATION
Status: DISCONTINUED | OUTPATIENT
Start: 2021-01-01 | End: 2021-01-01 | Stop reason: HOSPADM

## 2021-01-01 RX ORDER — MINERAL OIL AND PETROLATUM 150; 830 MG/G; MG/G
OINTMENT OPHTHALMIC
Status: DISCONTINUED | OUTPATIENT
Start: 2021-01-01 | End: 2021-01-01 | Stop reason: HOSPADM

## 2021-01-01 RX ORDER — IRON POLYSACCHARIDE COMPLEX 150 MG
150 CAPSULE ORAL DAILY
Qty: 60 CAPSULE | Refills: 5 | Status: SHIPPED | OUTPATIENT
Start: 2021-01-01 | End: 2022-01-01 | Stop reason: HOSPADM

## 2021-01-01 RX ORDER — LIDOCAINE HYDROCHLORIDE 10 MG/ML
INJECTION, SOLUTION EPIDURAL; INFILTRATION; INTRACAUDAL; PERINEURAL AS NEEDED
Status: DISCONTINUED | OUTPATIENT
Start: 2021-01-01 | End: 2021-01-01 | Stop reason: HOSPADM

## 2021-01-01 RX ORDER — METOPROLOL SUCCINATE 25 MG/1
TABLET, EXTENDED RELEASE ORAL
Qty: 90 TABLET | Refills: 0 | Status: SHIPPED | OUTPATIENT
Start: 2021-01-01 | End: 2021-01-01 | Stop reason: SDUPTHER

## 2021-01-01 RX ORDER — FUROSEMIDE 20 MG/1
40 TABLET ORAL 2 TIMES DAILY
Qty: 60 TABLET | Refills: 0 | Status: SHIPPED | OUTPATIENT
Start: 2021-01-01 | End: 2021-01-01

## 2021-01-01 RX ORDER — POLYVINYL ALCOHOL 14 MG/ML
2 SOLUTION/ DROPS OPHTHALMIC
Status: DISCONTINUED | OUTPATIENT
Start: 2021-01-01 | End: 2021-01-01 | Stop reason: RX

## 2021-01-01 RX ORDER — SODIUM CHLORIDE FOR INHALATION 0.9 %
3 VIAL, NEBULIZER (ML) INHALATION ONCE
Status: DISCONTINUED | OUTPATIENT
Start: 2021-01-01 | End: 2021-01-01 | Stop reason: HOSPADM

## 2021-01-01 RX ORDER — INSULIN GLARGINE 100 [IU]/ML
8 INJECTION, SOLUTION SUBCUTANEOUS
Status: DISCONTINUED | OUTPATIENT
Start: 2021-01-01 | End: 2021-01-01 | Stop reason: HOSPADM

## 2021-01-01 RX ORDER — OXYCODONE HYDROCHLORIDE AND ACETAMINOPHEN 5; 325 MG/1; MG/1
1 TABLET ORAL EVERY 4 HOURS PRN
Status: DISCONTINUED | OUTPATIENT
Start: 2021-01-01 | End: 2021-01-01 | Stop reason: HOSPADM

## 2021-01-01 RX ORDER — HYDROXYZINE HYDROCHLORIDE 25 MG/1
25 TABLET, FILM COATED ORAL EVERY 6 HOURS PRN
Qty: 90 TABLET | Refills: 0 | Status: SHIPPED | OUTPATIENT
Start: 2021-01-01 | End: 2021-01-01 | Stop reason: SDUPTHER

## 2021-01-01 RX ORDER — FLUTICASONE PROPIONATE 50 MCG
1 SPRAY, SUSPENSION (ML) NASAL DAILY
Qty: 18.2 ML | Refills: 3 | Status: SHIPPED | OUTPATIENT
Start: 2021-01-01 | End: 2022-01-01 | Stop reason: HOSPADM

## 2021-01-01 RX ORDER — ACETAMINOPHEN 325 MG/1
650 TABLET ORAL EVERY 6 HOURS PRN
Status: DISCONTINUED | OUTPATIENT
Start: 2021-01-01 | End: 2021-01-01 | Stop reason: HOSPADM

## 2021-01-01 RX ORDER — POTASSIUM CHLORIDE 20 MEQ/1
40 TABLET, EXTENDED RELEASE ORAL DAILY
Status: DISCONTINUED | OUTPATIENT
Start: 2021-01-01 | End: 2021-01-01 | Stop reason: HOSPADM

## 2021-01-01 RX ORDER — IPRATROPIUM BROMIDE AND ALBUTEROL SULFATE 2.5; .5 MG/3ML; MG/3ML
SOLUTION RESPIRATORY (INHALATION)
Qty: 360 ML | Refills: 7 | Status: SHIPPED | OUTPATIENT
Start: 2021-01-01 | End: 2021-01-01 | Stop reason: SDUPTHER

## 2021-01-01 RX ORDER — METOPROLOL SUCCINATE 50 MG/1
50 TABLET, EXTENDED RELEASE ORAL
Qty: 30 TABLET | Refills: 0
Start: 2021-01-01 | End: 2021-01-01

## 2021-01-01 RX ORDER — LIDOCAINE 50 MG/G
1 PATCH TOPICAL ONCE
Status: DISCONTINUED | OUTPATIENT
Start: 2021-01-01 | End: 2021-01-01

## 2021-01-01 RX ORDER — METOPROLOL TARTRATE 5 MG/5ML
5 INJECTION INTRAVENOUS ONCE
Status: COMPLETED | OUTPATIENT
Start: 2021-01-01 | End: 2021-01-01

## 2021-01-01 RX ORDER — MAGNESIUM SULFATE HEPTAHYDRATE 40 MG/ML
2 INJECTION, SOLUTION INTRAVENOUS ONCE
Status: DISCONTINUED | OUTPATIENT
Start: 2021-01-01 | End: 2021-01-01 | Stop reason: HOSPADM

## 2021-01-01 RX ORDER — KETOROLAC TROMETHAMINE 30 MG/ML
30 INJECTION, SOLUTION INTRAMUSCULAR; INTRAVENOUS ONCE
Status: COMPLETED | OUTPATIENT
Start: 2021-01-01 | End: 2021-01-01

## 2021-01-01 RX ORDER — POTASSIUM CHLORIDE 14.9 MG/ML
20 INJECTION INTRAVENOUS
Status: DISCONTINUED | OUTPATIENT
Start: 2021-01-01 | End: 2021-01-01

## 2021-01-01 RX ORDER — DOCUSATE SODIUM 100 MG/1
100 CAPSULE, LIQUID FILLED ORAL 2 TIMES DAILY
Status: DISCONTINUED | OUTPATIENT
Start: 2021-01-01 | End: 2021-01-01 | Stop reason: HOSPADM

## 2021-01-01 RX ORDER — POTASSIUM CHLORIDE 20 MEQ/1
40 TABLET, EXTENDED RELEASE ORAL 2 TIMES DAILY
Status: COMPLETED | OUTPATIENT
Start: 2021-01-01 | End: 2021-01-01

## 2021-01-01 RX ORDER — OXYCODONE HYDROCHLORIDE 5 MG/1
5 TABLET ORAL ONCE
Status: COMPLETED | OUTPATIENT
Start: 2021-01-01 | End: 2021-01-01

## 2021-01-01 RX ORDER — ALBUTEROL SULFATE 90 UG/1
2 AEROSOL, METERED RESPIRATORY (INHALATION) EVERY 4 HOURS PRN
Status: DISCONTINUED | OUTPATIENT
Start: 2021-01-01 | End: 2021-01-01 | Stop reason: HOSPADM

## 2021-01-01 RX ORDER — METOPROLOL TARTRATE 5 MG/5ML
5 INJECTION INTRAVENOUS EVERY 6 HOURS PRN
Status: DISCONTINUED | OUTPATIENT
Start: 2021-01-01 | End: 2021-01-01 | Stop reason: HOSPADM

## 2021-01-01 RX ORDER — OXYCODONE HYDROCHLORIDE AND ACETAMINOPHEN 5; 325 MG/1; MG/1
TABLET ORAL
COMMUNITY
Start: 2021-01-01 | End: 2021-01-01 | Stop reason: SDUPTHER

## 2021-01-01 RX ORDER — HYDROXYZINE HYDROCHLORIDE 25 MG/1
TABLET, FILM COATED ORAL
Qty: 120 TABLET | Refills: 0 | Status: SHIPPED | OUTPATIENT
Start: 2021-01-01 | End: 2022-01-01 | Stop reason: HOSPADM

## 2021-01-01 RX ORDER — METOPROLOL SUCCINATE 50 MG/1
50 TABLET, EXTENDED RELEASE ORAL
Qty: 30 TABLET | Refills: 0 | Status: SHIPPED | OUTPATIENT
Start: 2021-01-01 | End: 2021-01-01

## 2021-01-01 RX ORDER — HYDROCODONE BITARTRATE AND ACETAMINOPHEN 5; 325 MG/1; MG/1
1 TABLET ORAL EVERY 6 HOURS PRN
Status: DISCONTINUED | OUTPATIENT
Start: 2021-01-01 | End: 2021-01-01 | Stop reason: HOSPADM

## 2021-01-01 RX ORDER — AMIODARONE HYDROCHLORIDE 200 MG/1
200 TABLET ORAL
Qty: 30 TABLET | Refills: 0 | Status: SHIPPED | OUTPATIENT
Start: 2021-01-01 | End: 2021-01-01

## 2021-01-01 RX ORDER — LIDOCAINE 50 MG/G
1 PATCH TOPICAL DAILY
Qty: 30 PATCH | Refills: 0 | Status: SHIPPED | OUTPATIENT
Start: 2021-01-01 | End: 2022-01-01 | Stop reason: HOSPADM

## 2021-01-01 RX ORDER — HYDROXYZINE HYDROCHLORIDE 25 MG/1
25 TABLET, FILM COATED ORAL EVERY 6 HOURS PRN
Qty: 90 TABLET | Refills: 0 | Status: SHIPPED | OUTPATIENT
Start: 2021-01-01 | End: 2021-01-01

## 2021-01-01 RX ORDER — PROPOFOL 10 MG/ML
INJECTION, EMULSION INTRAVENOUS AS NEEDED
Status: DISCONTINUED | OUTPATIENT
Start: 2021-01-01 | End: 2021-01-01

## 2021-01-01 RX ORDER — IRON POLYSACCHARIDE COMPLEX 150 MG
CAPSULE ORAL
Qty: 60 CAPSULE | Refills: 5 | Status: SHIPPED | OUTPATIENT
Start: 2021-01-01 | End: 2021-01-01 | Stop reason: SDUPTHER

## 2021-01-01 RX ORDER — PREDNISONE 1 MG/1
5 TABLET ORAL 2 TIMES DAILY WITH MEALS
Qty: 60 TABLET | Refills: 5 | Status: SHIPPED | OUTPATIENT
Start: 2021-01-01 | End: 2021-01-01

## 2021-01-01 RX ORDER — PREDNISONE 10 MG/1
5 TABLET ORAL 2 TIMES DAILY WITH MEALS
Status: DISCONTINUED | OUTPATIENT
Start: 2021-01-01 | End: 2021-01-01 | Stop reason: HOSPADM

## 2021-01-01 RX ORDER — AMIODARONE HYDROCHLORIDE 200 MG/1
200 TABLET ORAL 2 TIMES DAILY WITH MEALS
Qty: 17 TABLET | Refills: 0
Start: 2021-01-01 | End: 2021-01-01

## 2021-01-01 RX ORDER — HYDROXYZINE HYDROCHLORIDE 25 MG/1
25 TABLET, FILM COATED ORAL EVERY 6 HOURS PRN
Status: DISCONTINUED | OUTPATIENT
Start: 2021-01-01 | End: 2021-01-01 | Stop reason: HOSPADM

## 2021-01-01 RX ORDER — GABAPENTIN 300 MG/1
300 CAPSULE ORAL 3 TIMES DAILY
Qty: 90 CAPSULE | Refills: 0 | Status: SHIPPED | OUTPATIENT
Start: 2021-01-01 | End: 2022-01-01 | Stop reason: HOSPADM

## 2021-01-01 RX ORDER — LEVALBUTEROL 1.25 MG/.5ML
1.25 SOLUTION, CONCENTRATE RESPIRATORY (INHALATION)
Status: DISCONTINUED | OUTPATIENT
Start: 2021-01-01 | End: 2021-01-02 | Stop reason: HOSPADM

## 2021-01-01 RX ORDER — PREDNISONE 1 MG/1
TABLET ORAL
Qty: 60 TABLET | Refills: 1 | Status: SHIPPED | OUTPATIENT
Start: 2021-01-01 | End: 2021-01-01 | Stop reason: SDUPTHER

## 2021-01-01 RX ORDER — ALENDRONATE SODIUM 70 MG/1
TABLET ORAL
Qty: 4 TABLET | Refills: 5 | Status: SHIPPED | OUTPATIENT
Start: 2021-01-01 | End: 2022-01-01 | Stop reason: HOSPADM

## 2021-01-01 RX ORDER — FUROSEMIDE 20 MG/1
TABLET ORAL
Qty: 60 TABLET | Refills: 5 | Status: SHIPPED | OUTPATIENT
Start: 2021-01-01 | End: 2021-01-01

## 2021-01-01 RX ORDER — INSULIN GLARGINE 100 [IU]/ML
15 INJECTION, SOLUTION SUBCUTANEOUS
Status: DISCONTINUED | OUTPATIENT
Start: 2021-01-01 | End: 2021-01-01

## 2021-01-01 RX ORDER — HYDROMORPHONE HCL IN WATER/PF 6 MG/30 ML
0.2 PATIENT CONTROLLED ANALGESIA SYRINGE INTRAVENOUS EVERY 4 HOURS PRN
Status: DISCONTINUED | OUTPATIENT
Start: 2021-01-01 | End: 2021-01-01

## 2021-01-01 RX ORDER — POTASSIUM CHLORIDE 20 MEQ/1
40 TABLET, EXTENDED RELEASE ORAL ONCE
Status: DISCONTINUED | OUTPATIENT
Start: 2021-01-01 | End: 2021-01-01 | Stop reason: HOSPADM

## 2021-01-01 RX ORDER — LIDOCAINE 50 MG/G
1 PATCH TOPICAL ONCE
Status: COMPLETED | OUTPATIENT
Start: 2021-01-01 | End: 2021-01-01

## 2021-01-01 RX ORDER — PREDNISONE 1 MG/1
5 TABLET ORAL 2 TIMES DAILY WITH MEALS
Qty: 60 TABLET | Refills: 5 | Status: SHIPPED | OUTPATIENT
Start: 2021-01-01 | End: 2021-01-01 | Stop reason: SDUPTHER

## 2021-01-01 RX ORDER — FLUTICASONE FUROATE, UMECLIDINIUM BROMIDE AND VILANTEROL TRIFENATATE 100; 62.5; 25 UG/1; UG/1; UG/1
1 POWDER RESPIRATORY (INHALATION) DAILY
Qty: 180 EACH | Refills: 4 | Status: SHIPPED | OUTPATIENT
Start: 2021-01-01 | End: 2022-01-01 | Stop reason: HOSPADM

## 2021-01-01 RX ORDER — IPRATROPIUM BROMIDE AND ALBUTEROL SULFATE 2.5; .5 MG/3ML; MG/3ML
3 SOLUTION RESPIRATORY (INHALATION) ONCE
Status: DISCONTINUED | OUTPATIENT
Start: 2021-01-01 | End: 2021-01-01

## 2021-01-01 RX ORDER — B-COMPLEX WITH VITAMIN C
1 TABLET ORAL
Status: DISCONTINUED | OUTPATIENT
Start: 2021-01-01 | End: 2021-01-01 | Stop reason: HOSPADM

## 2021-01-01 RX ORDER — METHYLPREDNISOLONE ACETATE 40 MG/ML
INJECTION, SUSPENSION INTRA-ARTICULAR; INTRALESIONAL; INTRAMUSCULAR; SOFT TISSUE AS NEEDED
Status: DISCONTINUED | OUTPATIENT
Start: 2021-01-01 | End: 2021-01-01 | Stop reason: HOSPADM

## 2021-01-01 RX ORDER — AMIODARONE HYDROCHLORIDE 200 MG/1
200 TABLET ORAL
Refills: 0
Start: 2021-01-01 | End: 2021-01-01

## 2021-01-01 RX ORDER — BUPIVACAINE HYDROCHLORIDE 2.5 MG/ML
INJECTION, SOLUTION EPIDURAL; INFILTRATION; INTRACAUDAL AS NEEDED
Status: DISCONTINUED | OUTPATIENT
Start: 2021-01-01 | End: 2021-01-01 | Stop reason: HOSPADM

## 2021-01-01 RX ORDER — DILTIAZEM HYDROCHLORIDE 120 MG/1
120 CAPSULE, COATED, EXTENDED RELEASE ORAL DAILY
Refills: 0
Start: 2021-01-01 | End: 2021-01-01

## 2021-01-01 RX ORDER — HYDROXYZINE HYDROCHLORIDE 25 MG/1
25 TABLET, FILM COATED ORAL EVERY 6 HOURS PRN
Qty: 90 TABLET | Refills: 1 | Status: SHIPPED | OUTPATIENT
Start: 2021-01-01 | End: 2021-01-01

## 2021-01-01 RX ORDER — SENNOSIDES 8.6 MG
1 TABLET ORAL
Status: DISCONTINUED | OUTPATIENT
Start: 2021-01-01 | End: 2021-01-01 | Stop reason: HOSPADM

## 2021-01-01 RX ORDER — MUSCLE RUB CREAM 100; 150 MG/G; MG/G
CREAM TOPICAL 4 TIMES DAILY PRN
Qty: 85 G | Refills: 0 | Status: SHIPPED | OUTPATIENT
Start: 2021-01-01 | End: 2021-01-01

## 2021-01-01 RX ORDER — MONTELUKAST SODIUM 10 MG/1
10 TABLET ORAL
Qty: 90 TABLET | Refills: 1 | Status: SHIPPED | OUTPATIENT
Start: 2021-01-01 | End: 2021-01-01 | Stop reason: SDUPTHER

## 2021-01-01 RX ORDER — DILTIAZEM HYDROCHLORIDE 120 MG/1
CAPSULE, COATED, EXTENDED RELEASE ORAL
Qty: 30 CAPSULE | Refills: 5 | Status: SHIPPED | OUTPATIENT
Start: 2021-01-01 | End: 2022-01-01 | Stop reason: HOSPADM

## 2021-01-01 RX ADMIN — ISODIUM CHLORIDE 3 ML: 0.03 SOLUTION RESPIRATORY (INHALATION) at 07:42

## 2021-01-01 RX ADMIN — FORMOTEROL FUMARATE DIHYDRATE 20 MCG: 20 SOLUTION RESPIRATORY (INHALATION) at 07:21

## 2021-01-01 RX ADMIN — GABAPENTIN 300 MG: 300 CAPSULE ORAL at 09:02

## 2021-01-01 RX ADMIN — BUDESONIDE AND FORMOTEROL FUMARATE DIHYDRATE 2 PUFF: 160; 4.5 AEROSOL RESPIRATORY (INHALATION) at 18:10

## 2021-01-01 RX ADMIN — METHOCARBAMOL 500 MG: 500 TABLET ORAL at 21:29

## 2021-01-01 RX ADMIN — IPRATROPIUM BROMIDE AND ALBUTEROL SULFATE 3 ML: 2.5; .5 SOLUTION RESPIRATORY (INHALATION) at 13:07

## 2021-01-01 RX ADMIN — LEVALBUTEROL HYDROCHLORIDE 1.25 MG: 1.25 SOLUTION, CONCENTRATE RESPIRATORY (INHALATION) at 19:55

## 2021-01-01 RX ADMIN — IPRATROPIUM BROMIDE AND ALBUTEROL SULFATE 3 ML: 2.5; .5 SOLUTION RESPIRATORY (INHALATION) at 09:44

## 2021-01-01 RX ADMIN — LEVALBUTEROL HYDROCHLORIDE 1.25 MG: 1.25 SOLUTION, CONCENTRATE RESPIRATORY (INHALATION) at 19:17

## 2021-01-01 RX ADMIN — GABAPENTIN 300 MG: 300 CAPSULE ORAL at 16:57

## 2021-01-01 RX ADMIN — INSULIN LISPRO 3 UNITS: 100 INJECTION, SOLUTION INTRAVENOUS; SUBCUTANEOUS at 07:07

## 2021-01-01 RX ADMIN — POLYETHYLENE GLYCOL 3350 17 G: 17 POWDER, FOR SOLUTION ORAL at 08:02

## 2021-01-01 RX ADMIN — INSULIN GLARGINE 8 UNITS: 100 INJECTION, SOLUTION SUBCUTANEOUS at 00:54

## 2021-01-01 RX ADMIN — GLYCERIN, HYPROMELLOSE, POLYETHYLENE GLYCOL 2 DROP: .2; .2; 1 LIQUID OPHTHALMIC at 10:55

## 2021-01-01 RX ADMIN — MONTELUKAST SODIUM 10 MG: 10 TABLET, COATED ORAL at 22:00

## 2021-01-01 RX ADMIN — LEVALBUTEROL HYDROCHLORIDE 1.25 MG: 1.25 SOLUTION, CONCENTRATE RESPIRATORY (INHALATION) at 20:26

## 2021-01-01 RX ADMIN — LIDOCAINE 1 PATCH: 50 PATCH TOPICAL at 08:32

## 2021-01-01 RX ADMIN — METFORMIN HYDROCHLORIDE 500 MG: 500 TABLET ORAL at 08:55

## 2021-01-01 RX ADMIN — MENTHOL, UNSPECIFIED FORM AND METHYL SALICYLATE: 10; 150 CREAM TOPICAL at 21:02

## 2021-01-01 RX ADMIN — GLYCERIN, HYPROMELLOSE, POLYETHYLENE GLYCOL 2 DROP: .2; .2; 1 LIQUID OPHTHALMIC at 09:16

## 2021-01-01 RX ADMIN — DILTIAZEM HYDROCHLORIDE 30 MG: 30 TABLET, FILM COATED ORAL at 00:48

## 2021-01-01 RX ADMIN — INSULIN LISPRO 4 UNITS: 100 INJECTION, SOLUTION INTRAVENOUS; SUBCUTANEOUS at 07:21

## 2021-01-01 RX ADMIN — MONTELUKAST SODIUM 10 MG: 10 TABLET, COATED ORAL at 21:52

## 2021-01-01 RX ADMIN — ISODIUM CHLORIDE 3 ML: 0.03 SOLUTION RESPIRATORY (INHALATION) at 14:34

## 2021-01-01 RX ADMIN — INSULIN LISPRO 4 UNITS: 100 INJECTION, SOLUTION INTRAVENOUS; SUBCUTANEOUS at 21:02

## 2021-01-01 RX ADMIN — INSULIN LISPRO 1 UNITS: 100 INJECTION, SOLUTION INTRAVENOUS; SUBCUTANEOUS at 14:04

## 2021-01-01 RX ADMIN — INSULIN LISPRO 1 UNITS: 100 INJECTION, SOLUTION INTRAVENOUS; SUBCUTANEOUS at 11:54

## 2021-01-01 RX ADMIN — OXYCODONE HYDROCHLORIDE AND ACETAMINOPHEN 1 TABLET: 5; 325 TABLET ORAL at 16:48

## 2021-01-01 RX ADMIN — MAGNESIUM GLUCONATE 500 MG ORAL TABLET 400 MG: 500 TABLET ORAL at 17:34

## 2021-01-01 RX ADMIN — FUROSEMIDE 40 MG: 40 TABLET ORAL at 17:11

## 2021-01-01 RX ADMIN — POTASSIUM CHLORIDE 20 MEQ: 1500 TABLET, EXTENDED RELEASE ORAL at 14:07

## 2021-01-01 RX ADMIN — FUROSEMIDE 40 MG: 40 TABLET ORAL at 17:03

## 2021-01-01 RX ADMIN — PANTOPRAZOLE SODIUM 40 MG: 40 TABLET, DELAYED RELEASE ORAL at 08:07

## 2021-01-01 RX ADMIN — MONTELUKAST SODIUM 10 MG: 10 TABLET, COATED ORAL at 21:14

## 2021-01-01 RX ADMIN — INSULIN LISPRO 3 UNITS: 100 INJECTION, SOLUTION INTRAVENOUS; SUBCUTANEOUS at 08:39

## 2021-01-01 RX ADMIN — KETOROLAC TROMETHAMINE 30 MG: 30 INJECTION, SOLUTION INTRAMUSCULAR; INTRAVENOUS at 09:50

## 2021-01-01 RX ADMIN — POTASSIUM CHLORIDE 40 MEQ: 1500 TABLET, EXTENDED RELEASE ORAL at 08:27

## 2021-01-01 RX ADMIN — LEVALBUTEROL HYDROCHLORIDE 1.25 MG: 1.25 SOLUTION, CONCENTRATE RESPIRATORY (INHALATION) at 07:07

## 2021-01-01 RX ADMIN — ISODIUM CHLORIDE 3 ML: 0.03 SOLUTION RESPIRATORY (INHALATION) at 14:47

## 2021-01-01 RX ADMIN — ISODIUM CHLORIDE 3 ML: 0.03 SOLUTION RESPIRATORY (INHALATION) at 19:56

## 2021-01-01 RX ADMIN — TIOTROPIUM BROMIDE 18 MCG: 18 CAPSULE ORAL; RESPIRATORY (INHALATION) at 16:58

## 2021-01-01 RX ADMIN — APIXABAN 5 MG: 5 TABLET, FILM COATED ORAL at 17:11

## 2021-01-01 RX ADMIN — INSULIN LISPRO 1 UNITS: 100 INJECTION, SOLUTION INTRAVENOUS; SUBCUTANEOUS at 07:47

## 2021-01-01 RX ADMIN — LEVALBUTEROL HYDROCHLORIDE 1.25 MG: 1.25 SOLUTION, CONCENTRATE RESPIRATORY (INHALATION) at 07:31

## 2021-01-01 RX ADMIN — HYDROMORPHONE HYDROCHLORIDE 0.2 MG: 0.2 INJECTION, SOLUTION INTRAMUSCULAR; INTRAVENOUS; SUBCUTANEOUS at 12:17

## 2021-01-01 RX ADMIN — ISODIUM CHLORIDE 3 ML: 0.03 SOLUTION RESPIRATORY (INHALATION) at 07:14

## 2021-01-01 RX ADMIN — SODIUM CHLORIDE, SODIUM LACTATE, POTASSIUM CHLORIDE, AND CALCIUM CHLORIDE 125 ML/HR: .6; .31; .03; .02 INJECTION, SOLUTION INTRAVENOUS at 07:28

## 2021-01-01 RX ADMIN — METOPROLOL TARTRATE 25 MG: 25 TABLET, FILM COATED ORAL at 22:00

## 2021-01-01 RX ADMIN — DILTIAZEM HYDROCHLORIDE 30 MG: 30 TABLET, FILM COATED ORAL at 17:17

## 2021-01-01 RX ADMIN — PSYLLIUM HUSK 1 PACKET: 3.4 POWDER ORAL at 09:15

## 2021-01-01 RX ADMIN — AMIODARONE HYDROCHLORIDE 150 MG: 50 INJECTION, SOLUTION INTRAVENOUS at 11:34

## 2021-01-01 RX ADMIN — INSULIN LISPRO 1 UNITS: 100 INJECTION, SOLUTION INTRAVENOUS; SUBCUTANEOUS at 16:41

## 2021-01-01 RX ADMIN — FUROSEMIDE 40 MG: 10 INJECTION, SOLUTION INTRAVENOUS at 14:04

## 2021-01-01 RX ADMIN — MONTELUKAST SODIUM 10 MG: 10 TABLET, COATED ORAL at 21:29

## 2021-01-01 RX ADMIN — DILTIAZEM HYDROCHLORIDE 5 MG/HR: 5 INJECTION INTRAVENOUS at 09:01

## 2021-01-01 RX ADMIN — PANTOPRAZOLE SODIUM 40 MG: 40 TABLET, DELAYED RELEASE ORAL at 08:27

## 2021-01-01 RX ADMIN — POTASSIUM CHLORIDE 40 MEQ: 1500 TABLET, EXTENDED RELEASE ORAL at 08:13

## 2021-01-01 RX ADMIN — THEOPHYLLINE ANHYDROUS 200 MG: 100 CAPSULE, EXTENDED RELEASE ORAL at 09:02

## 2021-01-01 RX ADMIN — DILTIAZEM HYDROCHLORIDE 60 MG: 60 TABLET, FILM COATED ORAL at 05:17

## 2021-01-01 RX ADMIN — METOPROLOL SUCCINATE 75 MG: 50 TABLET, EXTENDED RELEASE ORAL at 11:12

## 2021-01-01 RX ADMIN — INSULIN LISPRO 1 UNITS: 100 INJECTION, SOLUTION INTRAVENOUS; SUBCUTANEOUS at 16:34

## 2021-01-01 RX ADMIN — PANTOPRAZOLE SODIUM 40 MG: 40 TABLET, DELAYED RELEASE ORAL at 17:01

## 2021-01-01 RX ADMIN — AMIODARONE HYDROCHLORIDE 200 MG: 100 TABLET ORAL at 09:26

## 2021-01-01 RX ADMIN — BUDESONIDE AND FORMOTEROL FUMARATE DIHYDRATE 2 PUFF: 160; 4.5 AEROSOL RESPIRATORY (INHALATION) at 17:52

## 2021-01-01 RX ADMIN — PANTOPRAZOLE SODIUM 40 MG: 40 TABLET, DELAYED RELEASE ORAL at 17:11

## 2021-01-01 RX ADMIN — DOCUSATE SODIUM 100 MG: 100 CAPSULE, LIQUID FILLED ORAL at 09:03

## 2021-01-01 RX ADMIN — LEVALBUTEROL HYDROCHLORIDE 1.25 MG: 1.25 SOLUTION, CONCENTRATE RESPIRATORY (INHALATION) at 19:30

## 2021-01-01 RX ADMIN — IPRATROPIUM BROMIDE 0.5 MG: 0.5 SOLUTION RESPIRATORY (INHALATION) at 15:41

## 2021-01-01 RX ADMIN — PANTOPRAZOLE SODIUM 40 MG: 40 TABLET, DELAYED RELEASE ORAL at 17:03

## 2021-01-01 RX ADMIN — GABAPENTIN 300 MG: 300 CAPSULE ORAL at 15:49

## 2021-01-01 RX ADMIN — POLYETHYLENE GLYCOL 3350 17 G: 17 POWDER, FOR SOLUTION ORAL at 09:15

## 2021-01-01 RX ADMIN — PREDNISONE 5 MG: 10 TABLET ORAL at 16:49

## 2021-01-01 RX ADMIN — FUROSEMIDE 40 MG: 10 INJECTION, SOLUTION INTRAMUSCULAR; INTRAVENOUS at 09:13

## 2021-01-01 RX ADMIN — INSULIN LISPRO 2 UNITS: 100 INJECTION, SOLUTION INTRAVENOUS; SUBCUTANEOUS at 21:13

## 2021-01-01 RX ADMIN — MENTHOL, UNSPECIFIED FORM AND METHYL SALICYLATE: 10; 150 CREAM TOPICAL at 21:33

## 2021-01-01 RX ADMIN — METHOCARBAMOL 500 MG: 500 TABLET ORAL at 21:33

## 2021-01-01 RX ADMIN — HYDROMORPHONE HYDROCHLORIDE 0.2 MG: 0.2 INJECTION, SOLUTION INTRAMUSCULAR; INTRAVENOUS; SUBCUTANEOUS at 21:02

## 2021-01-01 RX ADMIN — METHOCARBAMOL 500 MG: 500 TABLET ORAL at 14:16

## 2021-01-01 RX ADMIN — PREDNISONE 5 MG: 10 TABLET ORAL at 15:48

## 2021-01-01 RX ADMIN — PANTOPRAZOLE SODIUM 40 MG: 40 TABLET, DELAYED RELEASE ORAL at 17:17

## 2021-01-01 RX ADMIN — IPRATROPIUM BROMIDE 0.5 MG: 0.5 SOLUTION RESPIRATORY (INHALATION) at 07:29

## 2021-01-01 RX ADMIN — ISODIUM CHLORIDE 3 ML: 0.03 SOLUTION RESPIRATORY (INHALATION) at 19:46

## 2021-01-01 RX ADMIN — GABAPENTIN 300 MG: 300 CAPSULE ORAL at 21:29

## 2021-01-01 RX ADMIN — GABAPENTIN 300 MG: 300 CAPSULE ORAL at 16:28

## 2021-01-01 RX ADMIN — INSULIN LISPRO 2 UNITS: 100 INJECTION, SOLUTION INTRAVENOUS; SUBCUTANEOUS at 12:19

## 2021-01-01 RX ADMIN — PANTOPRAZOLE SODIUM 40 MG: 40 TABLET, DELAYED RELEASE ORAL at 09:19

## 2021-01-01 RX ADMIN — APIXABAN 5 MG: 5 TABLET, FILM COATED ORAL at 17:00

## 2021-01-01 RX ADMIN — Medication 1 TABLET: at 08:13

## 2021-01-01 RX ADMIN — LEVALBUTEROL HYDROCHLORIDE 1.25 MG: 1.25 SOLUTION, CONCENTRATE RESPIRATORY (INHALATION) at 19:50

## 2021-01-01 RX ADMIN — METHOCARBAMOL 500 MG: 500 TABLET ORAL at 13:39

## 2021-01-01 RX ADMIN — PREDNISONE 5 MG: 10 TABLET ORAL at 09:12

## 2021-01-01 RX ADMIN — HYDROMORPHONE HYDROCHLORIDE 0.5 MG: 1 INJECTION, SOLUTION INTRAMUSCULAR; INTRAVENOUS; SUBCUTANEOUS at 23:13

## 2021-01-01 RX ADMIN — GABAPENTIN 300 MG: 300 CAPSULE ORAL at 08:31

## 2021-01-01 RX ADMIN — TIOTROPIUM BROMIDE 18 MCG: 18 CAPSULE ORAL; RESPIRATORY (INHALATION) at 09:17

## 2021-01-01 RX ADMIN — MONTELUKAST SODIUM 10 MG: 10 TABLET, COATED ORAL at 21:13

## 2021-01-01 RX ADMIN — MENTHOL, UNSPECIFIED FORM AND METHYL SALICYLATE 1 APPLICATION: 10; 150 CREAM TOPICAL at 14:23

## 2021-01-01 RX ADMIN — METHOCARBAMOL 500 MG: 500 TABLET ORAL at 22:00

## 2021-01-01 RX ADMIN — PANTOPRAZOLE SODIUM 40 MG: 40 TABLET, DELAYED RELEASE ORAL at 17:34

## 2021-01-01 RX ADMIN — LIDOCAINE 1 PATCH: 50 PATCH TOPICAL at 08:15

## 2021-01-01 RX ADMIN — APIXABAN 2.5 MG: 2.5 TABLET, FILM COATED ORAL at 17:05

## 2021-01-01 RX ADMIN — METHOCARBAMOL 500 MG: 500 TABLET ORAL at 21:14

## 2021-01-01 RX ADMIN — LEVALBUTEROL HYDROCHLORIDE 1.25 MG: 1.25 SOLUTION, CONCENTRATE RESPIRATORY (INHALATION) at 07:42

## 2021-01-01 RX ADMIN — PREDNISONE 5 MG: 10 TABLET ORAL at 07:47

## 2021-01-01 RX ADMIN — APIXABAN 5 MG: 5 TABLET, FILM COATED ORAL at 09:17

## 2021-01-01 RX ADMIN — PANTOPRAZOLE SODIUM 40 MG: 40 TABLET, DELAYED RELEASE ORAL at 21:52

## 2021-01-01 RX ADMIN — Medication 12.5 MG: at 14:15

## 2021-01-01 RX ADMIN — IPRATROPIUM BROMIDE 0.5 MG: 0.5 SOLUTION RESPIRATORY (INHALATION) at 19:49

## 2021-01-01 RX ADMIN — Medication 12.5 MG: at 01:03

## 2021-01-01 RX ADMIN — INSULIN LISPRO 3 UNITS: 100 INJECTION, SOLUTION INTRAVENOUS; SUBCUTANEOUS at 11:11

## 2021-01-01 RX ADMIN — DILTIAZEM HYDROCHLORIDE 30 MG: 30 TABLET, FILM COATED ORAL at 14:03

## 2021-01-01 RX ADMIN — APIXABAN 5 MG: 5 TABLET, FILM COATED ORAL at 17:03

## 2021-01-01 RX ADMIN — OXYCODONE HYDROCHLORIDE AND ACETAMINOPHEN 1 TABLET: 5; 325 TABLET ORAL at 08:54

## 2021-01-01 RX ADMIN — LEVALBUTEROL HYDROCHLORIDE 1.25 MG: 1.25 SOLUTION, CONCENTRATE RESPIRATORY (INHALATION) at 07:25

## 2021-01-01 RX ADMIN — HYDROMORPHONE HYDROCHLORIDE 0.5 MG: 1 INJECTION, SOLUTION INTRAMUSCULAR; INTRAVENOUS; SUBCUTANEOUS at 04:09

## 2021-01-01 RX ADMIN — DILTIAZEM HYDROCHLORIDE 30 MG: 30 TABLET, FILM COATED ORAL at 05:40

## 2021-01-01 RX ADMIN — TIOTROPIUM BROMIDE 18 MCG: 18 CAPSULE ORAL; RESPIRATORY (INHALATION) at 08:37

## 2021-01-01 RX ADMIN — Medication 12.5 MG: at 05:47

## 2021-01-01 RX ADMIN — LIDOCAINE 1 PATCH: 50 PATCH TOPICAL at 08:37

## 2021-01-01 RX ADMIN — POTASSIUM CHLORIDE 20 MEQ: 1500 TABLET, EXTENDED RELEASE ORAL at 18:16

## 2021-01-01 RX ADMIN — Medication 12.5 MG: at 03:43

## 2021-01-01 RX ADMIN — PREDNISONE 5 MG: 5 TABLET ORAL at 15:49

## 2021-01-01 RX ADMIN — OXYCODONE HYDROCHLORIDE AND ACETAMINOPHEN 1 TABLET: 5; 325 TABLET ORAL at 19:49

## 2021-01-01 RX ADMIN — THEOPHYLLINE ANHYDROUS 200 MG: 100 CAPSULE, EXTENDED RELEASE ORAL at 08:42

## 2021-01-01 RX ADMIN — TIOTROPIUM BROMIDE 18 MCG: 18 CAPSULE ORAL; RESPIRATORY (INHALATION) at 08:13

## 2021-01-01 RX ADMIN — PREDNISONE 5 MG: 10 TABLET ORAL at 17:11

## 2021-01-01 RX ADMIN — HYDROXYZINE HYDROCHLORIDE 25 MG: 25 TABLET, FILM COATED ORAL at 01:27

## 2021-01-01 RX ADMIN — IPRATROPIUM BROMIDE 0.5 MG: 0.5 SOLUTION RESPIRATORY (INHALATION) at 20:40

## 2021-01-01 RX ADMIN — DILTIAZEM HYDROCHLORIDE 5 MG/HR: 5 INJECTION INTRAVENOUS at 08:46

## 2021-01-01 RX ADMIN — INSULIN LISPRO 1 UNITS: 100 INJECTION, SOLUTION INTRAVENOUS; SUBCUTANEOUS at 21:14

## 2021-01-01 RX ADMIN — MAGNESIUM GLUCONATE 500 MG ORAL TABLET 400 MG: 500 TABLET ORAL at 14:06

## 2021-01-01 RX ADMIN — MONTELUKAST SODIUM 10 MG: 10 TABLET, COATED ORAL at 22:12

## 2021-01-01 RX ADMIN — GABAPENTIN 300 MG: 300 CAPSULE ORAL at 21:02

## 2021-01-01 RX ADMIN — OXYCODONE HYDROCHLORIDE AND ACETAMINOPHEN 1 TABLET: 5; 325 TABLET ORAL at 16:16

## 2021-01-01 RX ADMIN — PREDNISONE 5 MG: 10 TABLET ORAL at 16:37

## 2021-01-01 RX ADMIN — Medication 1 TABLET: at 08:42

## 2021-01-01 RX ADMIN — PSYLLIUM HUSK 1 PACKET: 3.4 POWDER ORAL at 21:19

## 2021-01-01 RX ADMIN — BUDESONIDE AND FORMOTEROL FUMARATE DIHYDRATE 2 PUFF: 160; 4.5 AEROSOL RESPIRATORY (INHALATION) at 17:17

## 2021-01-01 RX ADMIN — MAGNESIUM GLUCONATE 500 MG ORAL TABLET 400 MG: 500 TABLET ORAL at 08:27

## 2021-01-01 RX ADMIN — INSULIN LISPRO 3 UNITS: 100 INJECTION, SOLUTION INTRAVENOUS; SUBCUTANEOUS at 22:28

## 2021-01-01 RX ADMIN — GABAPENTIN 300 MG: 300 CAPSULE ORAL at 21:00

## 2021-01-01 RX ADMIN — HYDROMORPHONE HYDROCHLORIDE 0.2 MG: 0.2 INJECTION, SOLUTION INTRAMUSCULAR; INTRAVENOUS; SUBCUTANEOUS at 18:12

## 2021-01-01 RX ADMIN — Medication 150 MG: at 09:12

## 2021-01-01 RX ADMIN — THEOPHYLLINE ANHYDROUS 200 MG: 100 CAPSULE, EXTENDED RELEASE ORAL at 08:37

## 2021-01-01 RX ADMIN — APIXABAN 5 MG: 5 TABLET, FILM COATED ORAL at 08:31

## 2021-01-01 RX ADMIN — ISODIUM CHLORIDE 3 ML: 0.03 SOLUTION RESPIRATORY (INHALATION) at 13:22

## 2021-01-01 RX ADMIN — THEOPHYLLINE ANHYDROUS 200 MG: 100 CAPSULE, EXTENDED RELEASE ORAL at 09:19

## 2021-01-01 RX ADMIN — GABAPENTIN 300 MG: 300 CAPSULE ORAL at 22:27

## 2021-01-01 RX ADMIN — PANTOPRAZOLE SODIUM 40 MG: 40 TABLET, DELAYED RELEASE ORAL at 18:08

## 2021-01-01 RX ADMIN — Medication 12.5 MG: at 14:56

## 2021-01-01 RX ADMIN — DEXTROSE 0.5 MG/MIN: 5 SOLUTION INTRAVENOUS at 13:00

## 2021-01-01 RX ADMIN — FLUTICASONE FUROATE AND VILANTEROL TRIFENATATE 1 PUFF: 100; 25 POWDER RESPIRATORY (INHALATION) at 09:09

## 2021-01-01 RX ADMIN — PANTOPRAZOLE SODIUM 40 MG: 40 TABLET, DELAYED RELEASE ORAL at 08:55

## 2021-01-01 RX ADMIN — AMIODARONE HYDROCHLORIDE 1 MG/MIN: 50 INJECTION, SOLUTION INTRAVENOUS at 11:45

## 2021-01-01 RX ADMIN — BUDESONIDE AND FORMOTEROL FUMARATE DIHYDRATE 2 PUFF: 160; 4.5 AEROSOL RESPIRATORY (INHALATION) at 08:13

## 2021-01-01 RX ADMIN — FUROSEMIDE 40 MG: 10 INJECTION, SOLUTION INTRAVENOUS at 08:21

## 2021-01-01 RX ADMIN — INSULIN LISPRO 3 UNITS: 100 INJECTION, SOLUTION INTRAVENOUS; SUBCUTANEOUS at 17:01

## 2021-01-01 RX ADMIN — PANTOPRAZOLE SODIUM 40 MG: 40 TABLET, DELAYED RELEASE ORAL at 08:13

## 2021-01-01 RX ADMIN — MONTELUKAST SODIUM 10 MG: 10 TABLET, COATED ORAL at 22:27

## 2021-01-01 RX ADMIN — Medication 150 MG: at 08:43

## 2021-01-01 RX ADMIN — HYDROMORPHONE HYDROCHLORIDE 0.2 MG: 0.2 INJECTION, SOLUTION INTRAMUSCULAR; INTRAVENOUS; SUBCUTANEOUS at 09:18

## 2021-01-01 RX ADMIN — SITAGLIPTIN 50 MG: 50 TABLET, FILM COATED ORAL at 09:02

## 2021-01-01 RX ADMIN — PANTOPRAZOLE SODIUM 40 MG: 40 TABLET, DELAYED RELEASE ORAL at 17:05

## 2021-01-01 RX ADMIN — SITAGLIPTIN 50 MG: 50 TABLET, FILM COATED ORAL at 12:19

## 2021-01-01 RX ADMIN — METHOCARBAMOL 500 MG: 500 TABLET ORAL at 05:40

## 2021-01-01 RX ADMIN — GABAPENTIN 300 MG: 300 CAPSULE ORAL at 16:50

## 2021-01-01 RX ADMIN — METOPROLOL TARTRATE 25 MG: 25 TABLET, FILM COATED ORAL at 19:34

## 2021-01-01 RX ADMIN — PREDNISONE 5 MG: 5 TABLET ORAL at 16:28

## 2021-01-01 RX ADMIN — GABAPENTIN 300 MG: 300 CAPSULE ORAL at 16:49

## 2021-01-01 RX ADMIN — GABAPENTIN 300 MG: 300 CAPSULE ORAL at 15:45

## 2021-01-01 RX ADMIN — METOPROLOL TARTRATE 25 MG: 25 TABLET, FILM COATED ORAL at 16:28

## 2021-01-01 RX ADMIN — PREDNISONE 5 MG: 5 TABLET ORAL at 07:35

## 2021-01-01 RX ADMIN — METHOCARBAMOL 500 MG: 500 TABLET ORAL at 14:56

## 2021-01-01 RX ADMIN — LEVALBUTEROL HYDROCHLORIDE 1.25 MG: 1.25 SOLUTION, CONCENTRATE RESPIRATORY (INHALATION) at 07:38

## 2021-01-01 RX ADMIN — POTASSIUM CHLORIDE 20 MEQ: 14.9 INJECTION, SOLUTION INTRAVENOUS at 08:20

## 2021-01-01 RX ADMIN — LEVALBUTEROL HYDROCHLORIDE 1.25 MG: 1.25 SOLUTION, CONCENTRATE RESPIRATORY (INHALATION) at 20:40

## 2021-01-01 RX ADMIN — LEVALBUTEROL HYDROCHLORIDE 1.25 MG: 1.25 SOLUTION, CONCENTRATE RESPIRATORY (INHALATION) at 07:14

## 2021-01-01 RX ADMIN — MONTELUKAST SODIUM 10 MG: 10 TABLET, COATED ORAL at 21:10

## 2021-01-01 RX ADMIN — INSULIN LISPRO 4 UNITS: 100 INJECTION, SOLUTION INTRAVENOUS; SUBCUTANEOUS at 15:47

## 2021-01-01 RX ADMIN — METHOCARBAMOL 500 MG: 500 TABLET ORAL at 14:06

## 2021-01-01 RX ADMIN — LIDOCAINE 1 PATCH: 50 PATCH TOPICAL at 08:36

## 2021-01-01 RX ADMIN — ISODIUM CHLORIDE 3 ML: 0.03 SOLUTION RESPIRATORY (INHALATION) at 19:17

## 2021-01-01 RX ADMIN — GLYCERIN, HYPROMELLOSE, POLYETHYLENE GLYCOL 1 DROP: .2; .2; 1 LIQUID OPHTHALMIC at 09:10

## 2021-01-01 RX ADMIN — GABAPENTIN 300 MG: 300 CAPSULE ORAL at 08:07

## 2021-01-01 RX ADMIN — POTASSIUM CHLORIDE 40 MEQ: 1500 TABLET, EXTENDED RELEASE ORAL at 08:07

## 2021-01-01 RX ADMIN — LEVALBUTEROL HYDROCHLORIDE 1.25 MG: 1.25 SOLUTION, CONCENTRATE RESPIRATORY (INHALATION) at 20:04

## 2021-01-01 RX ADMIN — METHYLPREDNISOLONE SODIUM SUCCINATE 125 MG: 125 INJECTION, POWDER, FOR SOLUTION INTRAMUSCULAR; INTRAVENOUS at 07:26

## 2021-01-01 RX ADMIN — METHOCARBAMOL 500 MG: 500 TABLET ORAL at 05:03

## 2021-01-01 RX ADMIN — METHOCARBAMOL 500 MG: 500 TABLET ORAL at 14:53

## 2021-01-01 RX ADMIN — Medication 12.5 MG: at 21:52

## 2021-01-01 RX ADMIN — BUDESONIDE AND FORMOTEROL FUMARATE DIHYDRATE 2 PUFF: 160; 4.5 AEROSOL RESPIRATORY (INHALATION) at 08:35

## 2021-01-01 RX ADMIN — APIXABAN 2.5 MG: 2.5 TABLET, FILM COATED ORAL at 08:08

## 2021-01-01 RX ADMIN — THEOPHYLLINE ANHYDROUS 200 MG: 100 CAPSULE, EXTENDED RELEASE ORAL at 09:12

## 2021-01-01 RX ADMIN — TIOTROPIUM BROMIDE 18 MCG: 18 CAPSULE ORAL; RESPIRATORY (INHALATION) at 08:09

## 2021-01-01 RX ADMIN — APIXABAN 2.5 MG: 2.5 TABLET, FILM COATED ORAL at 17:11

## 2021-01-01 RX ADMIN — PANTOPRAZOLE SODIUM 40 MG: 40 TABLET, DELAYED RELEASE ORAL at 05:51

## 2021-01-01 RX ADMIN — ISODIUM CHLORIDE 3 ML: 0.03 SOLUTION RESPIRATORY (INHALATION) at 07:25

## 2021-01-01 RX ADMIN — INSULIN GLARGINE 25 UNITS: 100 INJECTION, SOLUTION SUBCUTANEOUS at 22:31

## 2021-01-01 RX ADMIN — HYDROMORPHONE HYDROCHLORIDE 0.2 MG: 0.2 INJECTION, SOLUTION INTRAMUSCULAR; INTRAVENOUS; SUBCUTANEOUS at 01:07

## 2021-01-01 RX ADMIN — GABAPENTIN 300 MG: 300 CAPSULE ORAL at 08:13

## 2021-01-01 RX ADMIN — METFORMIN HYDROCHLORIDE 500 MG: 500 TABLET ORAL at 12:18

## 2021-01-01 RX ADMIN — LIDOCAINE 1 PATCH: 50 PATCH TOPICAL at 19:29

## 2021-01-01 RX ADMIN — PREDNISONE 5 MG: 10 TABLET ORAL at 07:07

## 2021-01-01 RX ADMIN — INSULIN GLARGINE 10 UNITS: 100 INJECTION, SOLUTION SUBCUTANEOUS at 21:28

## 2021-01-01 RX ADMIN — APIXABAN 2.5 MG: 2.5 TABLET, FILM COATED ORAL at 17:17

## 2021-01-01 RX ADMIN — INSULIN GLARGINE 10 UNITS: 100 INJECTION, SOLUTION SUBCUTANEOUS at 08:48

## 2021-01-01 RX ADMIN — Medication 150 MG: at 14:05

## 2021-01-01 RX ADMIN — METHOCARBAMOL 500 MG: 500 TABLET ORAL at 05:17

## 2021-01-01 RX ADMIN — GABAPENTIN 300 MG: 300 CAPSULE ORAL at 09:17

## 2021-01-01 RX ADMIN — Medication 150 MG: at 08:08

## 2021-01-01 RX ADMIN — SODIUM CHLORIDE 500 ML: 0.9 INJECTION, SOLUTION INTRAVENOUS at 03:50

## 2021-01-01 RX ADMIN — METOPROLOL TARTRATE 5 MG: 5 INJECTION INTRAVENOUS at 08:53

## 2021-01-01 RX ADMIN — BUDESONIDE AND FORMOTEROL FUMARATE DIHYDRATE 2 PUFF: 160; 4.5 AEROSOL RESPIRATORY (INHALATION) at 17:48

## 2021-01-01 RX ADMIN — POTASSIUM CHLORIDE 40 MEQ: 1500 TABLET, EXTENDED RELEASE ORAL at 10:02

## 2021-01-01 RX ADMIN — MENTHOL, UNSPECIFIED FORM AND METHYL SALICYLATE: 10; 150 CREAM TOPICAL at 09:17

## 2021-01-01 RX ADMIN — LIDOCAINE 1 PATCH: 50 PATCH TOPICAL at 16:16

## 2021-01-01 RX ADMIN — PREDNISONE 5 MG: 5 TABLET ORAL at 08:13

## 2021-01-01 RX ADMIN — GABAPENTIN 300 MG: 300 CAPSULE ORAL at 21:10

## 2021-01-01 RX ADMIN — MENTHOL, UNSPECIFIED FORM AND METHYL SALICYLATE: 10; 150 CREAM TOPICAL at 16:55

## 2021-01-01 RX ADMIN — APIXABAN 2.5 MG: 2.5 TABLET, FILM COATED ORAL at 09:21

## 2021-01-01 RX ADMIN — Medication 12.5 MG: at 04:50

## 2021-01-01 RX ADMIN — PSYLLIUM HUSK 1 PACKET: 3.4 POWDER ORAL at 21:52

## 2021-01-01 RX ADMIN — LIDOCAINE 1 PATCH: 50 PATCH TOPICAL at 08:02

## 2021-01-01 RX ADMIN — MAGNESIUM GLUCONATE 500 MG ORAL TABLET 400 MG: 500 TABLET ORAL at 08:42

## 2021-01-01 RX ADMIN — LEVALBUTEROL HYDROCHLORIDE 1.25 MG: 1.25 SOLUTION, CONCENTRATE RESPIRATORY (INHALATION) at 19:23

## 2021-01-01 RX ADMIN — INSULIN LISPRO 3 UNITS: 100 INJECTION, SOLUTION INTRAVENOUS; SUBCUTANEOUS at 16:53

## 2021-01-01 RX ADMIN — GABAPENTIN 300 MG: 300 CAPSULE ORAL at 09:13

## 2021-01-01 RX ADMIN — HYDROMORPHONE HYDROCHLORIDE 0.2 MG: 0.2 INJECTION, SOLUTION INTRAMUSCULAR; INTRAVENOUS; SUBCUTANEOUS at 00:16

## 2021-01-01 RX ADMIN — ALBUTEROL SULFATE 2.5 MG: 2.5 SOLUTION RESPIRATORY (INHALATION) at 23:56

## 2021-01-01 RX ADMIN — TIOTROPIUM BROMIDE 18 MCG: 18 CAPSULE ORAL; RESPIRATORY (INHALATION) at 08:44

## 2021-01-01 RX ADMIN — BUDESONIDE AND FORMOTEROL FUMARATE DIHYDRATE 2 PUFF: 160; 4.5 AEROSOL RESPIRATORY (INHALATION) at 23:46

## 2021-01-01 RX ADMIN — THEOPHYLLINE ANHYDROUS 200 MG: 100 CAPSULE, EXTENDED RELEASE ORAL at 08:13

## 2021-01-01 RX ADMIN — APIXABAN 2.5 MG: 2.5 TABLET, FILM COATED ORAL at 08:36

## 2021-01-01 RX ADMIN — AMIODARONE HYDROCHLORIDE 200 MG: 100 TABLET ORAL at 08:42

## 2021-01-01 RX ADMIN — OXYCODONE HYDROCHLORIDE AND ACETAMINOPHEN 1 TABLET: 5; 325 TABLET ORAL at 07:06

## 2021-01-01 RX ADMIN — DILTIAZEM HYDROCHLORIDE 30 MG: 30 TABLET, FILM COATED ORAL at 19:49

## 2021-01-01 RX ADMIN — FUROSEMIDE 40 MG: 40 TABLET ORAL at 08:39

## 2021-01-01 RX ADMIN — BUDESONIDE 0.5 MG: 0.5 INHALANT ORAL at 07:06

## 2021-01-01 RX ADMIN — INSULIN LISPRO 3 UNITS: 100 INJECTION, SOLUTION INTRAVENOUS; SUBCUTANEOUS at 21:14

## 2021-01-01 RX ADMIN — IPRATROPIUM BROMIDE AND ALBUTEROL SULFATE 3 ML: 2.5; .5 SOLUTION RESPIRATORY (INHALATION) at 07:06

## 2021-01-01 RX ADMIN — METOPROLOL TARTRATE 5 MG: 5 INJECTION INTRAVENOUS at 02:54

## 2021-01-01 RX ADMIN — INSULIN GLARGINE 8 UNITS: 100 INJECTION, SOLUTION SUBCUTANEOUS at 23:02

## 2021-01-01 RX ADMIN — LIDOCAINE HYDROCHLORIDE 100 MG: 10 INJECTION, SOLUTION EPIDURAL; INFILTRATION; INTRACAUDAL; PERINEURAL at 07:42

## 2021-01-01 RX ADMIN — POTASSIUM CHLORIDE 40 MEQ: 1500 TABLET, EXTENDED RELEASE ORAL at 08:32

## 2021-01-01 RX ADMIN — MENTHOL, UNSPECIFIED FORM AND METHYL SALICYLATE: 10; 150 CREAM TOPICAL at 02:57

## 2021-01-01 RX ADMIN — LEVALBUTEROL HYDROCHLORIDE 1.25 MG: 1.25 SOLUTION, CONCENTRATE RESPIRATORY (INHALATION) at 13:27

## 2021-01-01 RX ADMIN — BUDESONIDE AND FORMOTEROL FUMARATE DIHYDRATE 2 PUFF: 160; 4.5 AEROSOL RESPIRATORY (INHALATION) at 17:11

## 2021-01-01 RX ADMIN — FUROSEMIDE 40 MG: 40 TABLET ORAL at 17:01

## 2021-01-01 RX ADMIN — BUDESONIDE AND FORMOTEROL FUMARATE DIHYDRATE 2 PUFF: 160; 4.5 AEROSOL RESPIRATORY (INHALATION) at 09:17

## 2021-01-01 RX ADMIN — GABAPENTIN 300 MG: 300 CAPSULE ORAL at 16:36

## 2021-01-01 RX ADMIN — MENTHOL, UNSPECIFIED FORM AND METHYL SALICYLATE: 10; 150 CREAM TOPICAL at 09:08

## 2021-01-01 RX ADMIN — LIDOCAINE 1 PATCH: 50 PATCH TOPICAL at 09:16

## 2021-01-01 RX ADMIN — APIXABAN 5 MG: 5 TABLET, FILM COATED ORAL at 09:03

## 2021-01-01 RX ADMIN — LEVALBUTEROL HYDROCHLORIDE 1.25 MG: 1.25 SOLUTION, CONCENTRATE RESPIRATORY (INHALATION) at 14:34

## 2021-01-01 RX ADMIN — Medication 12.5 MG: at 16:14

## 2021-01-01 RX ADMIN — THEOPHYLLINE ANHYDROUS 200 MG: 100 CAPSULE, EXTENDED RELEASE ORAL at 08:41

## 2021-01-01 RX ADMIN — BUDESONIDE AND FORMOTEROL FUMARATE DIHYDRATE 2 PUFF: 160; 4.5 AEROSOL RESPIRATORY (INHALATION) at 08:39

## 2021-01-01 RX ADMIN — THEOPHYLLINE ANHYDROUS 200 MG: 100 CAPSULE, EXTENDED RELEASE ORAL at 08:55

## 2021-01-01 RX ADMIN — Medication 12.5 MG: at 09:19

## 2021-01-01 RX ADMIN — HYDROMORPHONE HYDROCHLORIDE 0.2 MG: 0.2 INJECTION, SOLUTION INTRAMUSCULAR; INTRAVENOUS; SUBCUTANEOUS at 07:25

## 2021-01-01 RX ADMIN — APIXABAN 2.5 MG: 2.5 TABLET, FILM COATED ORAL at 08:55

## 2021-01-01 RX ADMIN — BUDESONIDE AND FORMOTEROL FUMARATE DIHYDRATE 2 PUFF: 160; 4.5 AEROSOL RESPIRATORY (INHALATION) at 08:37

## 2021-01-01 RX ADMIN — LIDOCAINE 1 PATCH: 50 PATCH TOPICAL at 08:31

## 2021-01-01 RX ADMIN — Medication 12.5 MG: at 09:13

## 2021-01-01 RX ADMIN — BUDESONIDE AND FORMOTEROL FUMARATE DIHYDRATE 2 PUFF: 160; 4.5 AEROSOL RESPIRATORY (INHALATION) at 08:56

## 2021-01-01 RX ADMIN — POLYETHYLENE GLYCOL 3350 17 G: 17 POWDER, FOR SOLUTION ORAL at 08:13

## 2021-01-01 RX ADMIN — APIXABAN 5 MG: 5 TABLET, FILM COATED ORAL at 17:01

## 2021-01-01 RX ADMIN — INSULIN LISPRO 1 UNITS: 100 INJECTION, SOLUTION INTRAVENOUS; SUBCUTANEOUS at 16:55

## 2021-01-01 RX ADMIN — DILTIAZEM HYDROCHLORIDE 60 MG: 60 TABLET, FILM COATED ORAL at 09:31

## 2021-01-01 RX ADMIN — DILTIAZEM HYDROCHLORIDE 15 MG: 5 INJECTION INTRAVENOUS at 08:10

## 2021-01-01 RX ADMIN — INSULIN LISPRO 1 UNITS: 100 INJECTION, SOLUTION INTRAVENOUS; SUBCUTANEOUS at 11:49

## 2021-01-01 RX ADMIN — HYDROMORPHONE HYDROCHLORIDE 0.5 MG: 1 INJECTION, SOLUTION INTRAMUSCULAR; INTRAVENOUS; SUBCUTANEOUS at 19:29

## 2021-01-01 RX ADMIN — DILTIAZEM HYDROCHLORIDE 60 MG: 60 TABLET, FILM COATED ORAL at 17:05

## 2021-01-01 RX ADMIN — IPRATROPIUM BROMIDE 0.5 MG: 0.5 SOLUTION RESPIRATORY (INHALATION) at 20:04

## 2021-01-01 RX ADMIN — HYDROMORPHONE HYDROCHLORIDE 0.2 MG: 0.2 INJECTION, SOLUTION INTRAMUSCULAR; INTRAVENOUS; SUBCUTANEOUS at 21:10

## 2021-01-01 RX ADMIN — LEVALBUTEROL HYDROCHLORIDE 1.25 MG: 1.25 SOLUTION, CONCENTRATE RESPIRATORY (INHALATION) at 13:47

## 2021-01-01 RX ADMIN — POTASSIUM CHLORIDE 20 MEQ: 20 SOLUTION ORAL at 14:50

## 2021-01-01 RX ADMIN — IPRATROPIUM BROMIDE 0.5 MG: 0.5 SOLUTION RESPIRATORY (INHALATION) at 13:47

## 2021-01-01 RX ADMIN — GABAPENTIN 300 MG: 300 CAPSULE ORAL at 16:15

## 2021-01-01 RX ADMIN — INSULIN LISPRO 3 UNITS: 100 INJECTION, SOLUTION INTRAVENOUS; SUBCUTANEOUS at 15:47

## 2021-01-01 RX ADMIN — INSULIN GLARGINE 10 UNITS: 100 INJECTION, SOLUTION SUBCUTANEOUS at 21:13

## 2021-01-01 RX ADMIN — PROPOFOL 30 MG: 10 INJECTION, EMULSION INTRAVENOUS at 07:45

## 2021-01-01 RX ADMIN — POLYETHYLENE GLYCOL 3350 17 G: 17 POWDER, FOR SOLUTION ORAL at 08:54

## 2021-01-01 RX ADMIN — ISODIUM CHLORIDE 3 ML: 0.03 SOLUTION RESPIRATORY (INHALATION) at 07:38

## 2021-01-01 RX ADMIN — FUROSEMIDE 40 MG: 40 TABLET ORAL at 21:51

## 2021-01-01 RX ADMIN — INSULIN LISPRO 5 UNITS: 100 INJECTION, SOLUTION INTRAVENOUS; SUBCUTANEOUS at 16:36

## 2021-01-01 RX ADMIN — DILTIAZEM HYDROCHLORIDE 60 MG: 60 TABLET, FILM COATED ORAL at 00:12

## 2021-01-01 RX ADMIN — METFORMIN HYDROCHLORIDE 500 MG: 500 TABLET ORAL at 09:19

## 2021-01-01 RX ADMIN — THEOPHYLLINE ANHYDROUS 200 MG: 100 CAPSULE, EXTENDED RELEASE ORAL at 17:00

## 2021-01-01 RX ADMIN — AMIODARONE HYDROCHLORIDE 200 MG: 100 TABLET ORAL at 16:26

## 2021-01-01 RX ADMIN — IPRATROPIUM BROMIDE 1 MG: 0.5 SOLUTION RESPIRATORY (INHALATION) at 16:26

## 2021-01-01 RX ADMIN — APIXABAN 5 MG: 5 TABLET, FILM COATED ORAL at 08:37

## 2021-01-01 RX ADMIN — DILTIAZEM HYDROCHLORIDE 120 MG: 120 CAPSULE, COATED, EXTENDED RELEASE ORAL at 11:18

## 2021-01-01 RX ADMIN — POTASSIUM CHLORIDE 20 MEQ: 1500 TABLET, EXTENDED RELEASE ORAL at 09:12

## 2021-01-01 RX ADMIN — IPRATROPIUM BROMIDE 0.5 MG: 0.5 SOLUTION RESPIRATORY (INHALATION) at 13:18

## 2021-01-01 RX ADMIN — PREDNISONE 5 MG: 10 TABLET ORAL at 08:55

## 2021-01-01 RX ADMIN — APIXABAN 5 MG: 5 TABLET, FILM COATED ORAL at 09:12

## 2021-01-01 RX ADMIN — LEVALBUTEROL HYDROCHLORIDE 1.25 MG: 1.25 SOLUTION, CONCENTRATE RESPIRATORY (INHALATION) at 15:00

## 2021-01-01 RX ADMIN — DILTIAZEM HYDROCHLORIDE 30 MG: 30 TABLET, FILM COATED ORAL at 00:39

## 2021-01-01 RX ADMIN — MONTELUKAST SODIUM 10 MG: 10 TABLET, COATED ORAL at 21:33

## 2021-01-01 RX ADMIN — LEVALBUTEROL HYDROCHLORIDE 1.25 MG: 1.25 SOLUTION, CONCENTRATE RESPIRATORY (INHALATION) at 07:30

## 2021-01-01 RX ADMIN — METHOCARBAMOL 500 MG: 500 TABLET ORAL at 05:46

## 2021-01-01 RX ADMIN — MAGNESIUM GLUCONATE 500 MG ORAL TABLET 400 MG: 500 TABLET ORAL at 17:05

## 2021-01-01 RX ADMIN — POTASSIUM CHLORIDE 20 MEQ: 20 SOLUTION ORAL at 12:42

## 2021-01-01 RX ADMIN — ISODIUM CHLORIDE 3 ML: 0.03 SOLUTION RESPIRATORY (INHALATION) at 15:00

## 2021-01-01 RX ADMIN — ISODIUM CHLORIDE 3 ML: 0.03 SOLUTION RESPIRATORY (INHALATION) at 07:31

## 2021-01-01 RX ADMIN — GABAPENTIN 300 MG: 300 CAPSULE ORAL at 21:14

## 2021-01-01 RX ADMIN — GABAPENTIN 300 MG: 300 CAPSULE ORAL at 16:41

## 2021-01-01 RX ADMIN — FUROSEMIDE 40 MG: 40 TABLET ORAL at 09:17

## 2021-01-01 RX ADMIN — HYDROXYZINE HYDROCHLORIDE 25 MG: 25 TABLET, FILM COATED ORAL at 20:16

## 2021-01-01 RX ADMIN — Medication 12.5 MG: at 17:57

## 2021-01-01 RX ADMIN — PREDNISONE 5 MG: 10 TABLET ORAL at 07:18

## 2021-01-01 RX ADMIN — METHOCARBAMOL 500 MG: 500 TABLET ORAL at 14:38

## 2021-01-01 RX ADMIN — DILTIAZEM HYDROCHLORIDE 30 MG: 30 TABLET, FILM COATED ORAL at 05:28

## 2021-01-01 RX ADMIN — BUDESONIDE AND FORMOTEROL FUMARATE DIHYDRATE 2 PUFF: 160; 4.5 AEROSOL RESPIRATORY (INHALATION) at 09:03

## 2021-01-01 RX ADMIN — METOPROLOL TARTRATE 5 MG: 5 INJECTION INTRAVENOUS at 11:10

## 2021-01-01 RX ADMIN — OXYCODONE HYDROCHLORIDE AND ACETAMINOPHEN 1 TABLET: 5; 325 TABLET ORAL at 01:57

## 2021-01-01 RX ADMIN — BUDESONIDE AND FORMOTEROL FUMARATE DIHYDRATE 2 PUFF: 160; 4.5 AEROSOL RESPIRATORY (INHALATION) at 08:44

## 2021-01-01 RX ADMIN — METFORMIN HYDROCHLORIDE 500 MG: 500 TABLET ORAL at 16:52

## 2021-01-01 RX ADMIN — HYDROXYZINE HYDROCHLORIDE 25 MG: 25 TABLET, FILM COATED ORAL at 14:18

## 2021-01-01 RX ADMIN — METHOCARBAMOL 500 MG: 500 TABLET ORAL at 22:27

## 2021-01-01 RX ADMIN — METOPROLOL TARTRATE 25 MG: 25 TABLET, FILM COATED ORAL at 11:05

## 2021-01-01 RX ADMIN — OXYCODONE HYDROCHLORIDE AND ACETAMINOPHEN 1 TABLET: 5; 325 TABLET ORAL at 10:11

## 2021-01-01 RX ADMIN — MAGNESIUM GLUCONATE 500 MG ORAL TABLET 400 MG: 500 TABLET ORAL at 08:07

## 2021-01-01 RX ADMIN — METHOCARBAMOL 500 MG: 500 TABLET ORAL at 21:13

## 2021-01-01 RX ADMIN — MORPHINE SULFATE 2 MG: 2 INJECTION, SOLUTION INTRAMUSCULAR; INTRAVENOUS at 12:54

## 2021-01-01 RX ADMIN — LEVALBUTEROL HYDROCHLORIDE 1.25 MG: 1.25 SOLUTION, CONCENTRATE RESPIRATORY (INHALATION) at 13:26

## 2021-01-01 RX ADMIN — BUDESONIDE AND FORMOTEROL FUMARATE DIHYDRATE 2 PUFF: 160; 4.5 AEROSOL RESPIRATORY (INHALATION) at 09:16

## 2021-01-01 RX ADMIN — IPRATROPIUM BROMIDE 0.5 MG: 0.5 SOLUTION RESPIRATORY (INHALATION) at 19:23

## 2021-01-01 RX ADMIN — METHOCARBAMOL 500 MG: 500 TABLET ORAL at 05:28

## 2021-01-01 RX ADMIN — MAGNESIUM GLUCONATE 500 MG ORAL TABLET 400 MG: 500 TABLET ORAL at 17:17

## 2021-01-01 RX ADMIN — GABAPENTIN 300 MG: 300 CAPSULE ORAL at 08:37

## 2021-01-01 RX ADMIN — INSULIN GLARGINE 10 UNITS: 100 INJECTION, SOLUTION SUBCUTANEOUS at 08:43

## 2021-01-01 RX ADMIN — IPRATROPIUM BROMIDE AND ALBUTEROL SULFATE 3 ML: .5; 3 SOLUTION RESPIRATORY (INHALATION) at 09:11

## 2021-01-01 RX ADMIN — HYDROMORPHONE HYDROCHLORIDE 0.5 MG: 1 INJECTION, SOLUTION INTRAMUSCULAR; INTRAVENOUS; SUBCUTANEOUS at 04:56

## 2021-01-01 RX ADMIN — GABAPENTIN 300 MG: 300 CAPSULE ORAL at 08:55

## 2021-01-01 RX ADMIN — METFORMIN HYDROCHLORIDE 500 MG: 500 TABLET ORAL at 07:47

## 2021-01-01 RX ADMIN — OXYCODONE HYDROCHLORIDE 5 MG: 5 TABLET ORAL at 11:19

## 2021-01-01 RX ADMIN — LIDOCAINE 1 PATCH: 50 PATCH TOPICAL at 09:17

## 2021-01-01 RX ADMIN — BUDESONIDE 0.5 MG: 0.5 INHALANT ORAL at 20:04

## 2021-01-01 RX ADMIN — LIDOCAINE 1 PATCH: 50 PATCH TOPICAL at 08:54

## 2021-01-01 RX ADMIN — PREDNISONE 5 MG: 10 TABLET ORAL at 09:19

## 2021-01-01 RX ADMIN — GABAPENTIN 300 MG: 300 CAPSULE ORAL at 21:13

## 2021-01-01 RX ADMIN — POTASSIUM CHLORIDE 40 MEQ: 1500 TABLET, EXTENDED RELEASE ORAL at 08:41

## 2021-01-01 RX ADMIN — FUROSEMIDE 40 MG: 40 TABLET ORAL at 08:30

## 2021-01-01 RX ADMIN — PREDNISONE 5 MG: 5 TABLET ORAL at 16:41

## 2021-01-01 RX ADMIN — PREDNISONE 5 MG: 5 TABLET ORAL at 08:42

## 2021-01-01 RX ADMIN — GABAPENTIN 300 MG: 300 CAPSULE ORAL at 08:27

## 2021-01-01 RX ADMIN — PREDNISONE 5 MG: 5 TABLET ORAL at 08:08

## 2021-01-01 RX ADMIN — FUROSEMIDE 60 MG: 10 INJECTION, SOLUTION INTRAMUSCULAR; INTRAVENOUS at 16:50

## 2021-01-01 RX ADMIN — APIXABAN 2.5 MG: 2.5 TABLET, FILM COATED ORAL at 08:12

## 2021-01-01 RX ADMIN — BUDESONIDE AND FORMOTEROL FUMARATE DIHYDRATE 2 PUFF: 160; 4.5 AEROSOL RESPIRATORY (INHALATION) at 08:08

## 2021-01-01 RX ADMIN — THEOPHYLLINE ANHYDROUS 200 MG: 100 CAPSULE, EXTENDED RELEASE ORAL at 14:07

## 2021-01-01 RX ADMIN — POTASSIUM CHLORIDE 40 MEQ: 1500 TABLET, EXTENDED RELEASE ORAL at 16:21

## 2021-01-01 RX ADMIN — DILTIAZEM HYDROCHLORIDE 30 MG: 30 TABLET, FILM COATED ORAL at 12:35

## 2021-01-01 RX ADMIN — PANTOPRAZOLE SODIUM 40 MG: 40 TABLET, DELAYED RELEASE ORAL at 09:03

## 2021-01-01 RX ADMIN — MENTHOL, UNSPECIFIED FORM AND METHYL SALICYLATE: 10; 150 CREAM TOPICAL at 21:15

## 2021-01-01 RX ADMIN — GABAPENTIN 300 MG: 300 CAPSULE ORAL at 21:59

## 2021-01-01 RX ADMIN — PANTOPRAZOLE SODIUM 40 MG: 40 TABLET, DELAYED RELEASE ORAL at 09:17

## 2021-01-01 RX ADMIN — METFORMIN HYDROCHLORIDE 500 MG: 500 TABLET ORAL at 17:11

## 2021-01-01 RX ADMIN — ISODIUM CHLORIDE 3 ML: 0.03 SOLUTION RESPIRATORY (INHALATION) at 20:14

## 2021-01-01 RX ADMIN — BUDESONIDE AND FORMOTEROL FUMARATE DIHYDRATE 2 PUFF: 160; 4.5 AEROSOL RESPIRATORY (INHALATION) at 18:08

## 2021-01-01 RX ADMIN — INSULIN LISPRO 3 UNITS: 100 INJECTION, SOLUTION INTRAVENOUS; SUBCUTANEOUS at 10:58

## 2021-01-01 RX ADMIN — METOPROLOL TARTRATE 25 MG: 25 TABLET, FILM COATED ORAL at 11:49

## 2021-01-01 RX ADMIN — LIDOCAINE 1 PATCH: 50 PATCH TOPICAL at 08:40

## 2021-01-01 RX ADMIN — MONTELUKAST SODIUM 10 MG: 10 TABLET, COATED ORAL at 21:02

## 2021-01-01 RX ADMIN — GABAPENTIN 300 MG: 300 CAPSULE ORAL at 21:33

## 2021-01-01 RX ADMIN — METOPROLOL TARTRATE 5 MG: 5 INJECTION INTRAVENOUS at 01:23

## 2021-01-01 RX ADMIN — THEOPHYLLINE ANHYDROUS 200 MG: 100 CAPSULE, EXTENDED RELEASE ORAL at 09:16

## 2021-01-01 RX ADMIN — DOCUSATE SODIUM 100 MG: 100 CAPSULE, LIQUID FILLED ORAL at 09:13

## 2021-01-01 RX ADMIN — APIXABAN 5 MG: 5 TABLET, FILM COATED ORAL at 18:08

## 2021-01-01 RX ADMIN — EPHEDRINE SULFATE 5 MG: 50 INJECTION, SOLUTION INTRAVENOUS at 07:42

## 2021-01-01 RX ADMIN — INSULIN GLARGINE 10 UNITS: 100 INJECTION, SOLUTION SUBCUTANEOUS at 22:00

## 2021-01-01 RX ADMIN — HYDROXYZINE HYDROCHLORIDE 25 MG: 25 TABLET, FILM COATED ORAL at 01:20

## 2021-01-01 RX ADMIN — LEVALBUTEROL HYDROCHLORIDE 1.25 MG: 1.25 SOLUTION, CONCENTRATE RESPIRATORY (INHALATION) at 13:18

## 2021-01-01 RX ADMIN — ISODIUM CHLORIDE 3 ML: 0.03 SOLUTION RESPIRATORY (INHALATION) at 13:27

## 2021-01-01 RX ADMIN — PANTOPRAZOLE SODIUM 40 MG: 40 TABLET, DELAYED RELEASE ORAL at 08:43

## 2021-01-01 RX ADMIN — PREDNISONE 5 MG: 10 TABLET ORAL at 07:25

## 2021-01-01 RX ADMIN — GABAPENTIN 300 MG: 300 CAPSULE ORAL at 08:42

## 2021-01-01 RX ADMIN — PREDNISONE 5 MG: 10 TABLET ORAL at 16:50

## 2021-01-01 RX ADMIN — PSYLLIUM HUSK 1 PACKET: 3.4 POWDER ORAL at 17:10

## 2021-01-01 RX ADMIN — IPRATROPIUM BROMIDE 0.5 MG: 0.5 SOLUTION RESPIRATORY (INHALATION) at 07:31

## 2021-01-01 RX ADMIN — FORMOTEROL FUMARATE DIHYDRATE 20 MCG: 20 SOLUTION RESPIRATORY (INHALATION) at 20:22

## 2021-01-01 RX ADMIN — LEVALBUTEROL HYDROCHLORIDE 1.25 MG: 1.25 SOLUTION, CONCENTRATE RESPIRATORY (INHALATION) at 15:41

## 2021-01-01 RX ADMIN — THEOPHYLLINE ANHYDROUS 200 MG: 100 CAPSULE, EXTENDED RELEASE ORAL at 08:30

## 2021-01-01 RX ADMIN — SODIUM PHOSPHATE, MONOBASIC, MONOHYDRATE 21 MMOL: 276; 142 INJECTION, SOLUTION INTRAVENOUS at 09:13

## 2021-01-01 RX ADMIN — INSULIN LISPRO 1 UNITS: 100 INJECTION, SOLUTION INTRAVENOUS; SUBCUTANEOUS at 08:37

## 2021-01-01 RX ADMIN — Medication 150 MG: at 08:13

## 2021-01-01 RX ADMIN — APIXABAN 2.5 MG: 2.5 TABLET, FILM COATED ORAL at 08:44

## 2021-01-01 RX ADMIN — APIXABAN 2.5 MG: 2.5 TABLET, FILM COATED ORAL at 17:34

## 2021-01-01 RX ADMIN — LEVALBUTEROL HYDROCHLORIDE 1.25 MG: 1.25 SOLUTION, CONCENTRATE RESPIRATORY (INHALATION) at 13:22

## 2021-01-01 RX ADMIN — METHOCARBAMOL 500 MG: 500 TABLET ORAL at 23:13

## 2021-01-01 RX ADMIN — MAGNESIUM GLUCONATE 500 MG ORAL TABLET 400 MG: 500 TABLET ORAL at 08:13

## 2021-01-01 RX ADMIN — DILTIAZEM HYDROCHLORIDE 60 MG: 60 TABLET, FILM COATED ORAL at 11:50

## 2021-01-01 RX ADMIN — GABAPENTIN 300 MG: 300 CAPSULE ORAL at 09:19

## 2021-01-01 RX ADMIN — LEVALBUTEROL HYDROCHLORIDE 1.25 MG: 1.25 SOLUTION, CONCENTRATE RESPIRATORY (INHALATION) at 19:45

## 2021-01-01 RX ADMIN — LEVALBUTEROL HYDROCHLORIDE 1.25 MG: 1.25 SOLUTION, CONCENTRATE RESPIRATORY (INHALATION) at 14:47

## 2021-01-01 RX ADMIN — BUDESONIDE AND FORMOTEROL FUMARATE DIHYDRATE 2 PUFF: 160; 4.5 AEROSOL RESPIRATORY (INHALATION) at 17:05

## 2021-01-01 RX ADMIN — Medication 150 MG: at 08:36

## 2021-01-01 RX ADMIN — TIOTROPIUM BROMIDE 18 MCG: 18 CAPSULE ORAL; RESPIRATORY (INHALATION) at 08:56

## 2021-01-01 RX ADMIN — PROPOFOL 60 MG: 10 INJECTION, EMULSION INTRAVENOUS at 07:42

## 2021-01-01 RX ADMIN — IPRATROPIUM BROMIDE AND ALBUTEROL SULFATE 3 ML: 2.5; .5 SOLUTION RESPIRATORY (INHALATION) at 09:11

## 2021-01-01 RX ADMIN — BUDESONIDE AND FORMOTEROL FUMARATE DIHYDRATE 2 PUFF: 160; 4.5 AEROSOL RESPIRATORY (INHALATION) at 13:59

## 2021-01-01 RX ADMIN — ALBUTEROL SULFATE 5 MG: 2.5 SOLUTION RESPIRATORY (INHALATION) at 16:26

## 2021-01-01 RX ADMIN — METHOCARBAMOL 500 MG: 500 TABLET ORAL at 05:30

## 2021-01-01 RX ADMIN — HYDROMORPHONE HYDROCHLORIDE 0.2 MG: 0.2 INJECTION, SOLUTION INTRAMUSCULAR; INTRAVENOUS; SUBCUTANEOUS at 13:56

## 2021-01-01 RX ADMIN — HYDROMORPHONE HYDROCHLORIDE 0.2 MG: 0.2 INJECTION, SOLUTION INTRAMUSCULAR; INTRAVENOUS; SUBCUTANEOUS at 07:15

## 2021-01-01 RX ADMIN — PREDNISONE 5 MG: 5 TABLET ORAL at 16:58

## 2021-01-01 RX ADMIN — OXYCODONE HYDROCHLORIDE AND ACETAMINOPHEN 1 TABLET: 5; 325 TABLET ORAL at 21:01

## 2021-01-01 RX ADMIN — BUDESONIDE AND FORMOTEROL FUMARATE DIHYDRATE 2 PUFF: 160; 4.5 AEROSOL RESPIRATORY (INHALATION) at 17:10

## 2021-01-01 RX ADMIN — Medication 1 TABLET: at 08:08

## 2021-01-01 RX ADMIN — ISODIUM CHLORIDE 3 ML: 0.03 SOLUTION RESPIRATORY (INHALATION) at 19:30

## 2021-01-01 RX ADMIN — INSULIN LISPRO 2 UNITS: 100 INJECTION, SOLUTION INTRAVENOUS; SUBCUTANEOUS at 16:54

## 2021-01-01 RX ADMIN — APIXABAN 2.5 MG: 2.5 TABLET, FILM COATED ORAL at 23:12

## 2021-01-01 RX ADMIN — LEVALBUTEROL HYDROCHLORIDE 1.25 MG: 1.25 SOLUTION, CONCENTRATE RESPIRATORY (INHALATION) at 07:37

## 2021-01-01 RX ADMIN — Medication 1 TABLET: at 07:35

## 2021-01-01 RX ADMIN — LEVALBUTEROL HYDROCHLORIDE 1.25 MG: 1.25 SOLUTION, CONCENTRATE RESPIRATORY (INHALATION) at 20:14

## 2021-01-01 RX ADMIN — THEOPHYLLINE ANHYDROUS 200 MG: 100 CAPSULE, EXTENDED RELEASE ORAL at 08:09

## 2021-01-01 RX ADMIN — PANTOPRAZOLE SODIUM 40 MG: 40 TABLET, DELAYED RELEASE ORAL at 08:31

## 2021-01-01 RX ADMIN — GABAPENTIN 300 MG: 300 CAPSULE ORAL at 21:52

## 2021-01-01 RX ADMIN — IPRATROPIUM BROMIDE 0.5 MG: 0.5 SOLUTION RESPIRATORY (INHALATION) at 20:26

## 2021-01-01 RX ADMIN — GABAPENTIN 300 MG: 300 CAPSULE ORAL at 17:11

## 2021-01-01 RX ADMIN — HYDROCODONE BITARTRATE AND ACETAMINOPHEN 1 TABLET: 5; 325 TABLET ORAL at 15:06

## 2021-01-01 RX ADMIN — INSULIN LISPRO 1 UNITS: 100 INJECTION, SOLUTION INTRAVENOUS; SUBCUTANEOUS at 21:33

## 2021-01-01 RX ADMIN — POTASSIUM CHLORIDE 10 MEQ: 750 TABLET, EXTENDED RELEASE ORAL at 09:02

## 2021-01-01 RX ADMIN — PSYLLIUM HUSK 1 PACKET: 3.4 POWDER ORAL at 08:54

## 2021-01-01 RX ADMIN — INSULIN LISPRO 3 UNITS: 100 INJECTION, SOLUTION INTRAVENOUS; SUBCUTANEOUS at 12:21

## 2021-01-01 RX ADMIN — MAGNESIUM SULFATE HEPTAHYDRATE 2 G: 40 INJECTION, SOLUTION INTRAVENOUS at 12:42

## 2021-01-01 RX ADMIN — OXYCODONE HYDROCHLORIDE AND ACETAMINOPHEN 1 TABLET: 5; 325 TABLET ORAL at 12:27

## 2021-01-01 RX ADMIN — INSULIN LISPRO 3 UNITS: 100 INJECTION, SOLUTION INTRAVENOUS; SUBCUTANEOUS at 07:47

## 2021-01-01 RX ADMIN — PREDNISONE 5 MG: 10 TABLET ORAL at 16:14

## 2021-01-01 RX ADMIN — DILTIAZEM HYDROCHLORIDE 60 MG: 60 TABLET, FILM COATED ORAL at 17:37

## 2021-01-01 RX ADMIN — IPRATROPIUM BROMIDE 0.5 MG: 0.5 SOLUTION RESPIRATORY (INHALATION) at 13:26

## 2021-01-01 RX ADMIN — INSULIN GLARGINE 25 UNITS: 100 INJECTION, SOLUTION SUBCUTANEOUS at 21:15

## 2021-01-01 RX ADMIN — ISODIUM CHLORIDE 3 ML: 0.03 SOLUTION RESPIRATORY (INHALATION) at 14:00

## 2021-01-01 RX ADMIN — PANTOPRAZOLE SODIUM 40 MG: 40 TABLET, DELAYED RELEASE ORAL at 14:07

## 2021-01-01 RX ADMIN — LEVALBUTEROL HYDROCHLORIDE 1.25 MG: 1.25 SOLUTION, CONCENTRATE RESPIRATORY (INHALATION) at 14:00

## 2021-01-01 RX ADMIN — IPRATROPIUM BROMIDE 0.5 MG: 0.5 SOLUTION RESPIRATORY (INHALATION) at 07:07

## 2021-01-01 RX ADMIN — Medication 2 TABLET: at 09:11

## 2021-01-01 RX ADMIN — PANTOPRAZOLE SODIUM 40 MG: 40 TABLET, DELAYED RELEASE ORAL at 08:37

## 2021-01-01 RX ADMIN — IPRATROPIUM BROMIDE 0.5 MG: 0.5 SOLUTION RESPIRATORY (INHALATION) at 07:37

## 2021-01-01 RX ADMIN — POTASSIUM CHLORIDE 40 MEQ: 1500 TABLET, EXTENDED RELEASE ORAL at 17:00

## 2021-01-01 SDOH — ECONOMIC STABILITY - INCOME SECURITY: PROBLEM RELATED TO HOUSING AND ECONOMIC CIRCUMSTANCES, UNSPECIFIED: Z59.9

## 2021-01-01 NOTE — UTILIZATION REVIEW
Initial Clinical Review    Admission: Date/Time/Statement: Placed in observation status  On 12/31 changed to inpatient admission on 1/1 due to the need  for continued diuresis  01/01/21 1450  Inpatient Admission    Transfer Service: Geriatric Medicine       Question Answer   Admitting Physician DALY DELACRUZ   Level of Care Med Surg   Estimated length of stay More than 2 Midnights   Certification I certify that inpatient services are medically necessary for this patient for a duration of greater than two midnights  See H&P and MD Progress Notes for additional information about the patient's course of treatment  ED Arrival Information     Expected Arrival Acuity Means of Arrival Escorted By Service Admission Type    12/30/2020 19:58 12/31/2020 07:51 Urgent Wheelchair Friend General Medicine Urgent    Arrival Complaint    shortness of breath        Chief Complaint   Patient presents with    Constipation     no BM for past 2-3 days per patient    Foot Swelling     bilateral; called her doctor and was told to increase her water pill approximately 1 5 weeks ago; states she did that and her feet and legs are still swollen    Shortness of Breath     Assessment/Plan: 81 yo f with a pmh of chronic diastolic CHF, COPD  O2 dependent, Afib, She presents to the ED with SOB and lower extremity edema  She has been seen inT ED multiple times ( twice last week ) due to her SOB and LE edema  She reports she spoke with her PCP who had her increase her home lasix and she feels this has not helped  She also reports some urinary frequency and burring as well as constipation the past 2 days  She is admitted to observation status for b/l le edema, with SOB treated  with iv lasix and KCL 20 meq  1/1 -  B/l le edema and SOB along with continued pain on the bottom of her feet when she walks  - continue Lasix 60 mg iv bid, potassium supplement and  continue home medicatons       ED Triage Vitals   Temperature Pulse Respirations Blood Pressure SpO2   12/31/20 0815 12/31/20 0803 12/31/20 0803 12/31/20 0804 12/31/20 0803   99 2 °F (37 3 °C) 70 (!) 26 133/67 99 %      Temp Source Heart Rate Source Patient Position - Orthostatic VS BP Location FiO2 (%)   12/31/20 0815 12/31/20 0803 12/31/20 0803 12/31/20 0803 --   Temporal Monitor Lying Left arm       Pain Score       12/31/20 1223       3          Wt Readings from Last 1 Encounters:   12/31/20 66 8 kg (147 lb 4 3 oz)     Additional Vital Signs:   Date/Time  Temp  Pulse  Resp  BP  MAP (mmHg)  SpO2  Calculated FIO2 (%) - Nasal Cannula  Nasal Cannula O2 Flow Rate (L/min)  O2 Device  Patient Position - Orthostatic VS   01/01/21 0745  98 °F (36 7 °C)  79  20  131/61    96 %  32  3 L/min  Nasal cannula  Sitting   01/01/21 0706            98 %  32  3 L/min  Nasal cannula     12/31/20 2227  97 3 °F (36 3 °C)Abnormal   87  19  105/66    99 %      None (Room air)  Lying   12/31/20 1924            96 %  32  3 L/min  Nasal cannula     12/31/20 1700    72    112/62               12/31/20 1500  97 2 °F (36 2 °C)Abnormal   76  18  107/55    98 %  32  3 L/min  Nasal cannula  Lying   12/31/20 1346            98 %  32  3 L/min  Nasal cannula     12/31/20 1228              32  3 L/min  None (Room air)     12/31/20 1210  98 °F (36 7 °C)  84  22  116/66  85  97 %  32  3 L/min  Nasal cannula  Lying   12/31/20 1130    93  24Abnormal       98 %           12/31/20 1015    86  20      98 %                   Pertinent Labs/Diagnostic Test Results:       Results from last 7 days   Lab Units 12/31/20  0821   WBC Thousand/uL 8 70   HEMOGLOBIN g/dL 10 8*   HEMATOCRIT % 33 7*   PLATELETS Thousands/uL 233   NEUTROS ABS Thousands/µL 7 60*         Results from last 7 days   Lab Units 12/31/20  0821   SODIUM mmol/L 137   POTASSIUM mmol/L 3 3*   CHLORIDE mmol/L 97*   CO2 mmol/L 33*   ANION GAP mmol/L 7   BUN mg/dL 13   CREATININE mg/dL 0 63   EGFR ml/min/1 73sq m 83 CALCIUM mg/dL 9 0             Results from last 7 days   Lab Units 12/31/20  0821   GLUCOSE RANDOM mg/dL 189*       Results from last 7 days   Lab Units 12/31/20  0821   TROPONIN I ng/mL 0 03         Results from last 7 days   Lab Units 12/31/20  0821   PROTIME seconds 14 2   INR  1 11     Results from last 7 days   Lab Units 12/31/20  0821   BNP pg/mL 61     Results from last 7 days   Lab Units 12/31/20  0902   CLARITY UA  Clear   COLOR UA  Yellow   SPEC GRAV UA  <=1 005*   PH UA  7 0   GLUCOSE UA mg/dl Negative   KETONES UA mg/dl Negative   BLOOD UA  Negative   PROTEIN UA mg/dl Negative   NITRITE UA  Negative   BILIRUBIN UA  Negative   UROBILINOGEN UA E U /dl 0 2   LEUKOCYTES UA  Negative       CXR 12/31 - mild pulmonary vascular congestion   ECG -   Collection Time Result Time Vent R Atrial R DE Int  QRSD Int  QT Int  QTC Int   P Axis QRS Axis T Wave Ax    12/31/20 08:59:14 12/31/20 10:54:03 88 147  86 376 454  -48 71    Sinus rhythm with frequent PACs   Left anterior fascicular block   Abnormal ECG          ED Treatment:   Medication Administration from 12/31/2020 0750 to 12/31/2020 1207       Date/Time Order Dose Route Action Comments     12/31/2020 0848 furosemide (LASIX) injection 40 mg 40 mg Intravenous Given      12/31/2020 1112 potassium chloride (K-DUR,KLOR-CON) CR tablet 40 mEq 40 mEq Oral Given      12/31/2020 1112 docusate sodium (COLACE) capsule 100 mg 100 mg Oral Given      12/31/2020 1113 albuterol inhalation solution 2 5 mg 2 5 mg Nebulization Given         Past Medical History:   Diagnosis Date    Allergies     Asthma     Chronic diastolic CHF (congestive heart failure) (HCC)     Colitis     COPD (chronic obstructive pulmonary disease)     Diverticulosis     DJD (degenerative joint disease)     Gait abnormality     Hypertension     Paroxysmal atrial fibrillation ([address] )      Present on Admission:   COPD (chronic obstructive pulmonary disease) (HCC)   Congestive heart failure [facility])      Admitting Diagnosis: Shortness of breath [R06 02]  Hypokalemia [E87 6]  CHF (congestive heart failure) (HCC) [I50 9]  Constipation [K59 00]  Bilateral swelling of feet [M79 89]  Bilateral lower extremity edema [R60 0]  Age/Sex: 82 y o  female         Admission Orders:  Scheduled Medications:  apixaban, 5 mg, Oral, BID  budesonide, 0 5 mg, Nebulization, BID  calcium carbonate-vitamin D, 2 tablet, Oral, Daily With Breakfast  docusate sodium, 100 mg, Oral, Daily  fluticasone-vilanterol, 1 puff, Inhalation, Daily  formoterol, 20 mcg, Nebulization, Q12H  furosemide, 40 mg, Intravenous, BID (diuretic)  gabapentin, 300 mg, Oral, TID  ipratropium-albuterol, 3 mL, Nebulization, Q6H While awake  iron polysaccharides, 150 mg, Oral, Daily  metoprolol tartrate, 12 5 mg, Oral, Q12H CONG  montelukast, 10 mg, Oral, HS  pantoprazole, 40 mg, Oral, Early Morning  polyethylene glycol, 17 g, Oral, Daily  potassium chloride, 20 mEq, Oral, Daily  predniSONE, 5 mg, Oral, BID With Meals  theophylline, 200 mg, Oral, Daily      Continuous IV Infusions:     PRN Meds:  acetaminophen, 650 mg, Oral, Q4H PRN  acetaminophen, 650 mg, Oral, Q6H PRN  acetaminophen, 975 mg, Oral, Q6H PRN  glycerin-hypromellose-, 1 drop, Both Eyes, Q3H PRN  simethicone, 80 mg, Oral, Q6H PRN      Nursing Orders -  VS -  Cons carb diet - fluid restriction 1500 ml     Network Utilization Review Department  ATTENTION: Please call with any questions or concerns to [phone] and carefully listen to the prompts so that you are directed to the right person  All voicemails are confidential   Gaston Hernández all requests for admission clinical reviews, approved or denied determinations and any other requests to dedicated fax number below belonging to the campus where the patient is receiving treatment   List of dedicated fax numbers for the Facilities:  [address] [place] [address] DENIALS (Administrative/Medical Necessity) [phone]   [address] (Maternity/NICU/Pediatrics) 261 [facility],7Th Floor [facility] [address] [place] [address]  [phone]   Annie Whitley 6927 (  [place] Man "[address]ila" 103) [address] [place] [address] [place] 1481 [phone]   [address] 951 [phone]

## 2021-01-01 NOTE — PROGRESS NOTES
Progress Note - Lakeshia Rasmussen 82 y o  female MRN: [id number]    Unit/Bed#: -02 Encounter: [id number]        Subjective:   Patient seen and examined at bedside after reviewing the chart and discussing the case with the caring staff  Patient examined at bedside  Patient's lower extremity edema has improved and her shortness of breath is at baseline  Patient reports pain in her legs on the bottom of her feet when she walks  Physical Exam   Vitals: Blood pressure 131/61, pulse 79, temperature 98 °F (36 7 °C), temperature source Temporal, resp  rate 20, height 5' (1 524 m), weight 66 8 kg (147 lb 4 3 oz), SpO2 96 %, not currently breastfeeding  ,Body mass index is 28 76 kg/m²  Constitutional: Patient appears well-developed  HEENT: PERR, EOMI, MMM  Cardiovascular: Normal rate and regular rhythm  Pulmonary/Chest: Effort normal and breath sounds normal    Abdomen: Soft, + BS, NT  Extremities:  Improving lower extremity edema    Assessment/Plan:  Lakeshia Rasmussen is a(n) 82 y o  female with bilateral lower extremity edema and shortness of breath      1  Bilateral lower extremity edema with shortness of breath  Continue Lasix 60 mg IV BID and potassium chloride 20 mEq daily  2  Cardiac with history of hypertension, atrial fibrillation   Continue Eliquis, diltiazem, Lasix, metoprolol and potassium chloride  3  COPD   Continue Breo Ellipta, Pulmicort, Perforomist, DuoNeb, Singulair, prednisone  4  Osteoporosis   Holding alendronate as patient takes this weekly at home but is unsure of last dose  Continue calcium-vitamin D    5  DJD/osteoarthritis   Patient may receive Tylenol as needed  6  Iron Deficiency Anemia   Continue Ferrex  7  Dry eyes   Patient may get artificial tears on as-needed basis  8  Constipation  Continue Colace and MiraLax daily  9  GERD  Continue pantoprazole  10  Gait abnormality/lower extremity pain with walking    I will consult PT and OT for further evaluation and treatment        Prognosis: Fair      Discharge Plan: In progress  Patient will be discharged as early as 01/02/2021 providing continue improvement in her lower extremity edema and pain with walking  The patient was discussed with Dr Jeanie Fuentes and he is in agreement with the above note

## 2021-01-01 NOTE — NURSING NOTE
Pt resting in bed comfortably  Offers no complaints at this time  Side rails up x2, call bell within reach, bed alarm on  Will continue to monitor

## 2021-01-01 NOTE — PLAN OF CARE
Problem: Potential for Falls  Goal: Patient will remain free of falls  Description: INTERVENTIONS:  - Assess patient frequently for physical needs  -  Identify cognitive and physical deficits and behaviors that affect risk of falls    -  [place] fall precautions as indicated by assessment   - Educate patient/family on patient safety including physical limitations  - Instruct patient to call for assistance with activity based on assessment  - Modify environment to reduce risk of injury  - Consider OT/PT consult to assist with strengthening/mobility  Outcome: Progressing     Problem: PAIN - ADULT  Goal: Verbalizes/displays adequate comfort level or baseline comfort level  Description: Interventions:  - Encourage patient to monitor pain and request assistance  - Assess pain using appropriate pain scale  - Administer analgesics based on type and severity of pain and evaluate response  - Implement non-pharmacological measures as appropriate and evaluate response  - Consider cultural and social influences on pain and pain management  - Notify physician/advanced practitioner if interventions unsuccessful or patient reports new pain  Outcome: Progressing     Problem: INFECTION - ADULT  Goal: Absence or prevention of progression during hospitalization  Description: INTERVENTIONS:  - Assess and monitor for signs and symptoms of infection  - Monitor lab/diagnostic results  - Monitor all insertion sites, i e  indwelling lines, tubes, and drains  - Monitor endotracheal if appropriate and nasal secretions for changes in amount and color  - [place] appropriate cooling/warming therapies per order  - Administer medications as ordered  - Instruct and encourage patient and family to use good hand hygiene technique  - Identify and instruct in appropriate isolation precautions for identified infection/condition  Outcome: Progressing  Goal: Absence of fever/infection during neutropenic period  Description: INTERVENTIONS:  - Monitor WBC    Outcome: Progressing     Problem: SAFETY ADULT  Goal: Maintain or return to baseline ADL function  Description: INTERVENTIONS:  -  Assess patient's ability to carry out ADLs; assess patient's baseline for ADL function and identify physical deficits which impact ability to perform ADLs (bathing, care of mouth/teeth, toileting, grooming, dressing, etc )  - Assess/evaluate cause of self-care deficits   - Assess range of motion  - Assess patient's mobility; develop plan if impaired  - Assess patient's need for assistive devices and provide as appropriate  - Encourage maximum independence but intervene and supervise when necessary  - Involve family in performance of ADLs  - Assess for home care needs following discharge   - Consider OT consult to assist with ADL evaluation and planning for discharge  - Provide patient education as appropriate  Outcome: Progressing  Goal: Maintain or return mobility status to optimal level  Description: INTERVENTIONS:  - Assess patient's baseline mobility status (ambulation, transfers, stairs, etc )    - Identify cognitive and physical deficits and behaviors that affect mobility  - Identify mobility aids required to assist with transfers and/or ambulation (gait belt, sit-to-stand, lift, walker, cane, etc )  - [place] fall precautions as indicated by assessment  - Record patient progress and toleration of activity level on Mobility SBAR; progress patient to next Phase/Stage  - Instruct patient to call for assistance with activity based on assessment  - Consider rehabilitation consult to assist with strengthening/weightbearing, etc   Outcome: Progressing     Problem: DISCHARGE PLANNING  Goal: Discharge to home or other facility with appropriate resources  Description: INTERVENTIONS:  - Identify barriers to discharge w/patient and caregiver  - Arrange for needed discharge resources and transportation as appropriate  - Identify discharge learning needs (meds, wound care, etc )  - Arrange for interpretive services to assist at discharge as needed  - Refer to Case Management Department for coordinating discharge planning if the patient needs post-hospital services based on physician/advanced practitioner order or complex needs related to functional status, cognitive ability, or social support system  Outcome: Progressing     Problem: Knowledge Deficit  Goal: Patient/family/caregiver demonstrates understanding of disease process, treatment plan, medications, and discharge instructions  Description: Complete learning assessment and assess knowledge base    Interventions:  - Provide teaching at level of understanding  - Provide teaching via preferred learning methods  Outcome: Progressing

## 2021-01-01 NOTE — PLAN OF CARE
Problem: Potential for Falls  Goal: Patient will remain free of falls  Description: INTERVENTIONS:  - Assess patient frequently for physical needs  -  Identify cognitive and physical deficits and behaviors that affect risk of falls    -  [place] fall precautions as indicated by assessment   - Educate patient/family on patient safety including physical limitations  - Instruct patient to call for assistance with activity based on assessment  - Modify environment to reduce risk of injury  - Consider OT/PT consult to assist with strengthening/mobility  Outcome: Progressing     Problem: PAIN - ADULT  Goal: Verbalizes/displays adequate comfort level or baseline comfort level  Description: Interventions:  - Encourage patient to monitor pain and request assistance  - Assess pain using appropriate pain scale  - Administer analgesics based on type and severity of pain and evaluate response  - Implement non-pharmacological measures as appropriate and evaluate response  - Consider cultural and social influences on pain and pain management  - Notify physician/advanced practitioner if interventions unsuccessful or patient reports new pain  Outcome: Progressing     Problem: INFECTION - ADULT  Goal: Absence or prevention of progression during hospitalization  Description: INTERVENTIONS:  - Assess and monitor for signs and symptoms of infection  - Monitor lab/diagnostic results  - Monitor all insertion sites, i e  indwelling lines, tubes, and drains  - Monitor endotracheal if appropriate and nasal secretions for changes in amount and color  - [place] appropriate cooling/warming therapies per order  - Administer medications as ordered  - Instruct and encourage patient and family to use good hand hygiene technique  - Identify and instruct in appropriate isolation precautions for identified infection/condition  Outcome: Progressing  Goal: Absence of fever/infection during neutropenic period  Description: INTERVENTIONS:  - Monitor WBC    Outcome: Progressing     Problem: SAFETY ADULT  Goal: Maintain or return to baseline ADL function  Description: INTERVENTIONS:  -  Assess patient's ability to carry out ADLs; assess patient's baseline for ADL function and identify physical deficits which impact ability to perform ADLs (bathing, care of mouth/teeth, toileting, grooming, dressing, etc )  - Assess/evaluate cause of self-care deficits   - Assess range of motion  - Assess patient's mobility; develop plan if impaired  - Assess patient's need for assistive devices and provide as appropriate  - Encourage maximum independence but intervene and supervise when necessary  - Involve family in performance of ADLs  - Assess for home care needs following discharge   - Consider OT consult to assist with ADL evaluation and planning for discharge  - Provide patient education as appropriate  Outcome: Progressing  Goal: Maintain or return mobility status to optimal level  Description: INTERVENTIONS:  - Assess patient's baseline mobility status (ambulation, transfers, stairs, etc )    - Identify cognitive and physical deficits and behaviors that affect mobility  - Identify mobility aids required to assist with transfers and/or ambulation (gait belt, sit-to-stand, lift, walker, cane, etc )  - [place] fall precautions as indicated by assessment  - Record patient progress and toleration of activity level on Mobility SBAR; progress patient to next Phase/Stage  - Instruct patient to call for assistance with activity based on assessment  - Consider rehabilitation consult to assist with strengthening/weightbearing, etc   Outcome: Progressing     Problem: DISCHARGE PLANNING  Goal: Discharge to home or other facility with appropriate resources  Description: INTERVENTIONS:  - Identify barriers to discharge w/patient and caregiver  - Arrange for needed discharge resources and transportation as appropriate  - Identify discharge learning needs (meds, wound care, etc )  - Arrange for interpretive services to assist at discharge as needed  - Refer to Case Management Department for coordinating discharge planning if the patient needs post-hospital services based on physician/advanced practitioner order or complex needs related to functional status, cognitive ability, or social support system  Outcome: Progressing     Problem: Knowledge Deficit  Goal: Patient/family/caregiver demonstrates understanding of disease process, treatment plan, medications, and discharge instructions  Description: Complete learning assessment and assess knowledge base    Interventions:  - Provide teaching at level of understanding  - Provide teaching via preferred learning methods  Outcome: Progressing

## 2021-01-02 VITALS
DIASTOLIC BLOOD PRESSURE: 60 MMHG | TEMPERATURE: 98.4 F | WEIGHT: 147.27 LBS | BODY MASS INDEX: 28.91 KG/M2 | SYSTOLIC BLOOD PRESSURE: 118 MMHG | HEIGHT: 60 IN | HEART RATE: 62 BPM | OXYGEN SATURATION: 98 % | RESPIRATION RATE: 20 BRPM

## 2021-01-02 LAB
ANION GAP SERPL CALCULATED.3IONS-SCNC: 9 MMOL/L (ref 4–13)
BUN SERPL-MCNC: 12 MG/DL (ref 7–25)
CALCIUM SERPL-MCNC: 8.9 MG/DL (ref 8.6–10.5)
CHLORIDE SERPL-SCNC: 98 MMOL/L (ref 98–107)
CO2 SERPL-SCNC: 31 MMOL/L (ref 21–31)
CREAT SERPL-MCNC: 0.62 MG/DL (ref 0.6–1.2)
GFR SERPL CREATININE-BSD FRML MDRD: 84 ML/MIN/1.73SQ M
GLUCOSE SERPL-MCNC: 198 MG/DL (ref 65–99)
MRSA NOSE QL CULT: NORMAL
POTASSIUM SERPL-SCNC: 3.1 MMOL/L (ref 3.5–5.5)
SODIUM SERPL-SCNC: 138 MMOL/L (ref 134–143)

## 2021-01-02 PROCEDURE — 97163 PT EVAL HIGH COMPLEX 45 MIN: CPT

## 2021-01-02 PROCEDURE — 97165 OT EVAL LOW COMPLEX 30 MIN: CPT

## 2021-01-02 PROCEDURE — 80048 BASIC METABOLIC PNL TOTAL CA: CPT | Performed by: PHYSICIAN ASSISTANT

## 2021-01-02 PROCEDURE — 94760 N-INVAS EAR/PLS OXIMETRY 1: CPT

## 2021-01-02 PROCEDURE — 94640 AIRWAY INHALATION TREATMENT: CPT

## 2021-01-02 RX ORDER — FUROSEMIDE 20 MG/1
40 TABLET ORAL 2 TIMES DAILY
Qty: 60 TABLET | Refills: 0 | Status: SHIPPED | OUTPATIENT
Start: 2021-01-02 | End: 2021-01-01 | Stop reason: SDUPTHER

## 2021-01-02 RX ORDER — POTASSIUM CHLORIDE 20 MEQ/1
40 TABLET, EXTENDED RELEASE ORAL DAILY
Qty: 60 TABLET | Refills: 0 | Status: SHIPPED | OUTPATIENT
Start: 2021-01-03 | End: 2021-01-01

## 2021-01-02 RX ORDER — POTASSIUM CHLORIDE 20 MEQ/1
20 TABLET, EXTENDED RELEASE ORAL ONCE
Status: COMPLETED | OUTPATIENT
Start: 2021-01-02 | End: 2021-01-02

## 2021-01-02 RX ORDER — POLYETHYLENE GLYCOL 3350 17 G/17G
17 POWDER, FOR SOLUTION ORAL DAILY
Qty: 510 G | Refills: 0 | Status: SHIPPED | OUTPATIENT
Start: 2021-01-03 | End: 2022-01-01 | Stop reason: HOSPADM

## 2021-01-02 RX ORDER — POTASSIUM CHLORIDE 20 MEQ/1
40 TABLET, EXTENDED RELEASE ORAL DAILY
Status: DISCONTINUED | OUTPATIENT
Start: 2021-01-03 | End: 2021-01-02 | Stop reason: HOSPADM

## 2021-01-02 RX ORDER — POTASSIUM CHLORIDE 20 MEQ/1
40 TABLET, EXTENDED RELEASE ORAL DAILY
Qty: 60 TABLET | Refills: 0 | Status: SHIPPED | OUTPATIENT
Start: 2021-01-03 | End: 2021-01-02

## 2021-01-02 RX ADMIN — POTASSIUM CHLORIDE 20 MEQ: 1500 TABLET, EXTENDED RELEASE ORAL at 08:04

## 2021-01-02 RX ADMIN — IPRATROPIUM BROMIDE 0.5 MG: 0.5 SOLUTION RESPIRATORY (INHALATION) at 07:26

## 2021-01-02 RX ADMIN — GABAPENTIN 300 MG: 300 CAPSULE ORAL at 08:04

## 2021-01-02 RX ADMIN — Medication 150 MG: at 08:05

## 2021-01-02 RX ADMIN — Medication 12.5 MG: at 08:05

## 2021-01-02 RX ADMIN — Medication 2 TABLET: at 08:04

## 2021-01-02 RX ADMIN — PREDNISONE 5 MG: 10 TABLET ORAL at 08:04

## 2021-01-02 RX ADMIN — FUROSEMIDE 60 MG: 10 INJECTION, SOLUTION INTRAMUSCULAR; INTRAVENOUS at 08:04

## 2021-01-02 RX ADMIN — FLUTICASONE FUROATE AND VILANTEROL TRIFENATATE 1 PUFF: 100; 25 POWDER RESPIRATORY (INHALATION) at 08:11

## 2021-01-02 RX ADMIN — THEOPHYLLINE ANHYDROUS 200 MG: 100 CAPSULE, EXTENDED RELEASE ORAL at 08:04

## 2021-01-02 RX ADMIN — GLYCERIN, HYPROMELLOSE, POLYETHYLENE GLYCOL 1 DROP: .2; .2; 1 LIQUID OPHTHALMIC at 08:11

## 2021-01-02 RX ADMIN — POLYETHYLENE GLYCOL 3350 17 G: 17 POWDER, FOR SOLUTION ORAL at 08:05

## 2021-01-02 RX ADMIN — GLYCERIN, HYPROMELLOSE, POLYETHYLENE GLYCOL 1 DROP: .2; .2; 1 LIQUID OPHTHALMIC at 06:27

## 2021-01-02 RX ADMIN — BUDESONIDE 0.5 MG: 0.5 INHALANT ORAL at 07:26

## 2021-01-02 RX ADMIN — ACETAMINOPHEN 975 MG: 325 TABLET ORAL at 06:26

## 2021-01-02 RX ADMIN — DOCUSATE SODIUM 100 MG: 100 CAPSULE, LIQUID FILLED ORAL at 08:04

## 2021-01-02 RX ADMIN — LEVALBUTEROL HYDROCHLORIDE 1.25 MG: 1.25 SOLUTION, CONCENTRATE RESPIRATORY (INHALATION) at 07:26

## 2021-01-02 RX ADMIN — OXYCODONE HYDROCHLORIDE AND ACETAMINOPHEN 1 TABLET: 5; 325 TABLET ORAL at 11:40

## 2021-01-02 RX ADMIN — PANTOPRAZOLE SODIUM 40 MG: 40 TABLET, DELAYED RELEASE ORAL at 06:08

## 2021-01-02 RX ADMIN — FORMOTEROL FUMARATE DIHYDRATE 20 MCG: 20 SOLUTION RESPIRATORY (INHALATION) at 07:42

## 2021-01-02 RX ADMIN — APIXABAN 5 MG: 5 TABLET, FILM COATED ORAL at 08:05

## 2021-01-02 RX ADMIN — POTASSIUM CHLORIDE 20 MEQ: 1500 TABLET, EXTENDED RELEASE ORAL at 11:40

## 2021-01-02 NOTE — OCCUPATIONAL THERAPY NOTE
Occupational Therapy Evaluation      Daniel Vasques    1/2/2021    Patient Active Problem List   Diagnosis    COPD (chronic obstructive pulmonary disease) ([facility] [address] )    Cardiac disease    Diverticulosis of intestine with bleeding    Diarrhea    Colorectal polyps    Effusion of bursa of left elbow    Cardiomegaly    Chronic anxiety    Chronic cystitis    Chronic right-sided low back pain without sciatica    Congestive heart failure (HCC)    Deviated nasal septum    Diverticulosis of colon    Gastroesophageal reflux disease without esophagitis    Gout    History of angioedema    History of colonic polyps    Lumbar radiculopathy    Macular degeneration of both eyes    Obesity    Osteoporosis    Other specified forms of chronic ischemic heart disease    Seasonal allergies    Panic attack    Vocal cord dysfunction    Colitis presumed infectious    Atrial fibrillation with rapid ventricular response (HCC)    Left elbow pain    Acute on chronic respiratory failure with hypoxia (HCC)    Debility    Positive culture findings in sputum    Compression fracture of L4 vertebra (HCC)    Closed wedge compression fracture of T12 vertebra (HCC)    Wound dehiscence, surgical, subsequent encounter    Chronic peptic ulcer of stomach    Chronic pain syndrome    Sacroiliitis (HCC)    Bronchitis, chronic, simple (HCC)    Paroxysmal atrial fibrillation (HCC)    Chronic diastolic CHF (congestive heart failure) (HCC)    SIRS (systemic inflammatory response syndrome) (HCC)    Hypokalemia    Shortness of breath       Past Medical History:   Diagnosis Date    Allergies     Asthma     Chronic diastolic CHF (congestive heart failure) (HCC)     Colitis     COPD (chronic obstructive pulmonary disease)     Diverticulosis     DJD (degenerative joint disease)     Gait abnormality     Hypertension     Paroxysmal atrial fibrillation (HCC)        Past Surgical History:   Procedure Laterality Date    BLADDER SURGERY      COLON SURGERY      COLONOSCOPY      COLONOSCOPY W/ POLYPECTOMY N/A 1/21/2019    Procedure: COLONOSCOPY W/ POLYPECTOMY;  Surgeon: Marlon Meier MD;  Location: [facility] GI LAB; Service: General    [place] GUIDED NEEDLE PLAC BX/ASP/INJ  9/2/2020    GASTROJEJUNOSTOMY W/ JEJUNOSTOMY TUBE N/A 2/3/2020    Procedure: Antrectomy with bilroth II Anastomosis; Surgeon: Tamera Bush MD;  Location: [facility];  Service: Jose Manuel Rasp JOINT Right 9/2/2020    Procedure: BLOCK / INJECTION SACROILIAC;  Surgeon: Deepti Antoine MD;  Location: [facility];  Service: Pain Management     SMALL INTESTINE SURGERY  03/2020    with partial gastrectomy        01/02/21 0913   OT Last Visit   OT Visit Date 01/02/21   Note Type   Note type Evaluation   Restrictions/Precautions   Weight Bearing Precautions Per Order No   Other Precautions O2;Fall Risk;Pain   Pain Assessment   Pain Assessment Tool 0-10   Pain Score 3   Pain Location/Orientation Orientation: Right;Location: Foot   Pain Onset/Description Onset: Ongoing; Descriptor: Aching;Descriptor: Throbbing   Hospital Pain Intervention(s) Ambulation/increased activity;Repositioned;Medication (See MAR); Emotional support   Home Living   Type of [facility] One level;Performs ADLs on one level; Able to live on main level with bedroom/bathroom;Stairs to enter with rails  (3 SAVANNAH)   Bathroom Shower/Tub Tub/shower unit   Bathroom Toilet Standard   Bathroom Equipment Grab bars in shower;Grab bars around toilet   [facility]  (no AD used at baseline )   Prior Function   Level of [place] Independent with ADLs and functional mobility   Lives With Alone   Receives Help From Neighbor;Friend(s)   ADL Assistance Independent   IADLs Needs assistance  (neighbor assists with meal preparation & pill management)   Falls in the last 6 months 0   Vocational Full time employment   Comments (-) driving Lifestyle   Autonomy Patient reporting being independent with ADLs, ambulatory with no AD and lives alone in a one story house with 3 SAVANNAH   Reciprocal Relationships supportive friend    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    ADL   Eating Assistance 6  Modified independent   [facility] 6  [address] [place] [address] [address]  5  Supervision/Setup    [facility] 5  [address]  5  Supervision/Setup   Bed Mobility   Supine to Sit 6  Modified independent   Additional items HOB elevated   Sit to Supine   (DNT: pt seated at EOB at end of eval )   Transfers   Sit to Stand 5  Supervision   Additional items Impulsive;Verbal cues   Stand to Sit 5  Supervision   Additional items Verbal cues   Functional Mobility   Functional Mobility 5  Supervision   Additional Comments Pt ambulated in hallway with no LOB  Pt reported feeling SOB at end of walk  Additional items Rolling walker   Balance   Static Sitting Normal   Dynamic Sitting Good   Static Standing Fair +   Dynamic Standing Fair +   Activity Tolerance   Activity Tolerance Patient limited by fatigue;Patient limited by pain   Nurse Made Aware RN made aware of outcomes    RUE Assessment   RUE Assessment WFL   LUE Assessment   LUE Assessment WFL   Hand Function   Gross Motor Coordination Functional   Fine Motor Coordination Functional   Cognition   Overall Cognitive Status WFL   Arousal/Participation Alert; Responsive; Cooperative   Attention Within functional limits   Orientation Level Oriented X4   Memory Within functional limits   Following Commands Follows all commands and directions without difficulty   Comments Pt agreeable to OT eval    Assessment   Prognosis Good   Assessment Pt is a 82 y o  female who was admitted to [address] [place] [address] on 12/31/2020 with Shortness of breath    At this time, patient is also affected by the comorbidities of: CHF, COPD, hypokalemia, A-fib, and SIRS  Additionally, patient is affected by the following personal factors:steps to enter environment and limited home support  Orders placed for OT evaluation/treatment with up and OOB as tolerated orders  Prior to admission, Patient reporting being independent with ADLs, ambulatory with no AD and lives alone in a one story house with 3 SAVANNAH  Upon OT evaluation, patient requires supervision/set-up for UB ADLs and supervision/set-up for LB ADLs  Patient presents with functional use of BUEs, with intact prehension and fine motor coordination, and symmetrical muscle strength  Patient appears to be functioning at baseline, no further Acute OT needs identified at this time to warrant OT services  D/C OT and from OT standpoint, recommendation at time of d/c would be return to previous environment with social support     Goals   Patient Goals to go home today    Plan   OT Frequency Eval only   Recommendation   OT Discharge Recommendation Return to previous environment with social support   OT - OK to Discharge Yes  (Once medically cleared )   Barthel Index   Feeding 10   Bathing 5   Grooming Score 5   Dressing Score 10   Bladder Score 10   Bowels Score 10   Toilet Use Score 10   Transfers (Bed/Chair) Score 15   Mobility (Level Surface) Score 0   Stairs Score 0   Barthel Index Score 75     Mariaelena Salvador, OT

## 2021-01-02 NOTE — PLAN OF CARE
Problem: PHYSICAL THERAPY ADULT  Goal: Performs mobility at highest level of function for planned discharge setting  See evaluation for individualized goals  Description: Treatment/Interventions: Elevations, Endurance training, Gait training  Equipment Recommended: Laura Salinas       See flowsheet documentation for full assessment, interventions and recommendations  Outcome: Progressing  Note: Prognosis: Excellent  Problem List: Pain, Impaired balance, Decreased mobility, Decreased safety awareness  Assessment: Pt is 82 y o  female seen for PT evaluation s/p admit to [facility] on 12/31/2020 w/ Shortness of breath  PT consulted to assess pt's functional mobility and d/c needs  Order placed for PT eval and tx, w/ up as tolerated order  Comorbidities affecting pt's physical performance at time of assessment include: COPD, thn and a fib  PTA, pt was ambulates community distances and elevations, has 3 SAVANNAH, lives alone in 1 level home and works full time  Personal factors affecting pt at time of IE include: ambulating w/ assistive device and stairs to enter home  Please find objective findings from PT assessment regarding body systems outlined above with impairments and limitations including impaired balance, gait deviations, pain, decreased activity tolerance, decreased functional mobility tolerance, decreased safety awareness, impaired judgement and fall risk  Pt's clinical presentation is currently unstable/unpredictable seen in pt's presentation of pain and abnormal lab values  Pt to benefit from continued PT tx to address deficits as defined above and maximize level of functional independent mobility and consistency  From PT/mobility standpoint, recommendation at time of d/c would be Home PT pending progress in order to facilitate return to PLOF    Barriers to Discharge: Decreased caregiver support     PT Discharge Recommendation: Home with skilled therapy     PT - OK to Discharge: Yes    See flowsheet documentation for full assessment     Sunitha Merchant, PT

## 2021-01-02 NOTE — NURSING NOTE
2100 lopressor held r/t SBP < 110 mmHg; will monitor  Patient received percocet after obtaining an order from Dr Zelda Hamilton for percocet for complaints of pain  Patient with complaints of low back pain and being jittery; requesting that her percocet that she takes at home be ordered

## 2021-01-02 NOTE — DISCHARGE SUMMARY
Discharge Summary -   Krysten Balderas 82 y o  female MRN: [id number]  Unit/Bed#: -02 Encounter: [id number]    Admission Date: 12/31/2020     Discharge Date:  01/02/2021    Admitting Diagnosis: Shortness of breath [R06 02]  Hypokalemia [E87 6]  CHF (congestive heart failure) (HCC) [I50 9]  Constipation [K59 00]  Bilateral swelling of feet [M79 89]  Bilateral lower extremity edema [R60 0]    Discharge Diagnosis:  SOB at baseline  Improving hypokalemia  CHF  Improving constipation  Improving bilateral lower extremity edema and swelling of feet    Attending:  Fly Ogden MD    Hospital Course: Krysten Balderas is a(n) 82 y o  female who presented from the ED where she was seen twice in the last week with swelling of her lower extremities and shortness of breath  Patient has history of COPD and is on 3 L nasal cannula at home   She states her PCP recommended to increase her Lasix dose about a week and a half before her presentation but she had not noticed a change in her lower extremity edema  Patient reported that her legs were so swollen they were painful  Chest x-ray obtained in the emergency department showed mild pulmonary vascular congestion  Patient was given IV Lasix 60 mg BID with significant improvement in her lower extremity edema, as well as potassium chloride for hypokalemia  Patient reports most of her leg pain has improved however she still has pain "when you touch the bone referring to anterior shins  Patient was seen by Physical and Occupational therapy with recommendations for home health care upon discharge  Patient will follow-up with her PCP for leg pain and her pulmonologist for COPD      Condition at Discharge:  FAIR     Discharge instructions/Information to patient and family:   See after visit summary for information provided to patient and family        Provisions for Follow-Up Care:  See after visit summary for information related to follow-up care and any pertinent home health orders  Disposition: Home with home health services    Discharge Statement   I spent 50 minutes discharging the patient  This time was spent on the day of discharge  I had direct contact with the patient on the day of discharge  Discharge Medications:  See after visit summary for reconciled discharge medications provided to patient and family  The patient was discussed with Dr Jeanne Arnold and he is in agreement with the above note

## 2021-01-02 NOTE — CASE MANAGEMENT
Pt was admitted for SOB, pt has a high risk score, pt lives alone, she has friends for support, lives in a 1 story home no steps inside & 4 steps outside, pt has a womet that helps her clean, she depends on her friend for transportation and to take her shopping, she states she is able to everything herself, pt was made aware that hhc was recommended and order was received, Pt REFUSED hhc, I did [place] text the the doctor to make her aware, pt was made aware of what she needs to do if she does change her mind concerning hhc, DME: oxygen 3 liters, shower bench, nebulizer, pt denies smoking & alcohol, pt has hx of hhc she thinks it was [facility], [place], pt denies any d/c needs  Pt called, her friend to transport her home, her friend is going to bring her portable oxygen tank from home, " in basket message was sent ot her pcp and outpt cm referral was made for this pt, pt denies any d/c needs, pt is in agreement with the d/c an d/c plan home,pt declined Dilma Weber, CM reviewed d/c planning process including the following: identifying help at home, patient preference for d/c planning needs, availability of treatment team to discuss questions or concerns patient and/or family may have regarding understanding medications and recognizing signs and symptoms once discharged  CM also encouraged patient to follow up with all recommended appointments after discharge  Patient advised of importance for patient and family to participate in managing patients medical well being

## 2021-01-02 NOTE — NURSING NOTE
Pt discharged home, refused home health care, explained to pt that it may be ordered through PCP  AF  VSS  Denies pain, denies shortness of breath  Discharge instructions read and explained to pt, all questions answered  IV removed without complications and tip intact  Pt assisted into w/c, transported to front entrance and assisted into car of neighbor, attached to portable oxygen tank

## 2021-01-02 NOTE — PHYSICAL THERAPY NOTE
Physical Therapy Evaluation     Patient's Name: Ivet Chang    Admitting Diagnosis  Shortness of breath [R06 02]  Hypokalemia [E87 6]  CHF (congestive heart failure) ([facility] ) [I50 9]  Constipation [K59 00]  Bilateral swelling of feet [M79 89]  Bilateral lower extremity edema [R60 0]    Problem List  Patient Active Problem List   Diagnosis    COPD (chronic obstructive pulmonary disease) ([facility] )    Cardiac disease    Diverticulosis of intestine with bleeding    Diarrhea    Colorectal polyps    Effusion of bursa of left elbow    Cardiomegaly    Chronic anxiety    Chronic cystitis    Chronic right-sided low back pain without sciatica    Congestive heart failure (HCC)    Deviated nasal septum    Diverticulosis of colon    Gastroesophageal reflux disease without esophagitis    Gout    History of angioedema    History of colonic polyps    Lumbar radiculopathy    Macular degeneration of both eyes    Obesity    Osteoporosis    Other specified forms of chronic ischemic heart disease    Seasonal allergies    Panic attack    Vocal cord dysfunction    Colitis presumed infectious    Atrial fibrillation with rapid ventricular response ([facility] )    Left elbow pain    Acute on chronic respiratory failure with hypoxia ([facility] )    Debility    Positive culture findings in sputum    Compression fracture of L4 vertebra (HCC)    Closed wedge compression fracture of T12 vertebra (HCC)    Wound dehiscence, surgical, subsequent encounter    Chronic peptic ulcer of stomach    Chronic pain syndrome    Sacroiliitis (HCC)    Bronchitis, chronic, simple (HCC)    Paroxysmal atrial fibrillation (HCC)    Chronic diastolic CHF (congestive heart failure) (HCC)    SIRS (systemic inflammatory response syndrome) (HCC)    Hypokalemia    Shortness of breath       Past Medical History  Past Medical History:   Diagnosis Date    Allergies     Asthma     Chronic diastolic CHF (congestive heart failure) ([facility] )     Colitis     COPD (chronic obstructive pulmonary disease)     Diverticulosis     DJD (degenerative joint disease)     Gait abnormality     Hypertension     Paroxysmal atrial fibrillation (HCC)        Past Surgical History  Past Surgical History:   Procedure Laterality Date    BLADDER SURGERY      COLON SURGERY      COLONOSCOPY      COLONOSCOPY W/ POLYPECTOMY N/A 1/21/2019    Procedure: COLONOSCOPY W/ POLYPECTOMY;  Surgeon: Donovan Johns MD;  Location: [facility] GI LAB; Service: General    FL GUIDED NEEDLE PLAC BX/ASP/INJ  9/2/2020    GASTROJEJUNOSTOMY W/ JEJUNOSTOMY TUBE N/A 2/3/2020    Procedure: Antrectomy with bilroth II Anastomosis; Surgeon: Kris Mallory MD;  Location:  MAIN OR;  Service: Yolanda Kast JOINT Right 9/2/2020    Procedure: BLOCK / INJECTION SACROILIAC;  Surgeon: Roman Pappas MD;  Location: [facility] MAIN OR;  Service: Pain Management     SMALL INTESTINE SURGERY  03/2020    with partial gastrectomy        01/02/21 0912   PT Last Visit   PT Visit Date 01/02/21   Note Type   Note type Evaluation   Pain Assessment   Pain Assessment Tool 0-10   Pain Score 3   Pain Location/Orientation Orientation: Right;Location: [facility] Pain Intervention(s) Repositioned   Home Living   Type of [facility] One level;Performs ADLs on one level; Able to live on main level with bedroom/bathroom;Stairs to enter with rails  (3 SAVANNAH)   Bathroom Shower/Tub Tub/shower unit   Bathroom Toilet Standard   Bathroom Equipment Grab bars in shower;Grab bars around toilet   [facility]  (no used at baseline)   Prior Function   Level of [place] Independent with ADLs and functional mobility   Lives With Alone   Receives Help From Neighbor;Friend(s)   ADL Assistance Independent   IADLs Needs assistance  (neighbor assists with meal preparation & pill management)   Falls in the last 6 months 0   Vocational Full time employment Comments -    Restrictions/Precautions   Weight Bearing Precautions Per Order No   Other Precautions O2;Fall Risk;Pain   General   Family/Caregiver Present No   Cognition   Overall Cognitive Status WFL   Arousal/Participation Alert   Orientation Level Oriented X4   Memory Within functional limits   Following Commands Follows all commands and directions without difficulty   Comments pt agreeable to PT session   RLE Assessment   RLE Assessment WFL   LLE Assessment   LLE Assessment WFL   Bed Mobility   Additional Comments upon enterance pt was seated EOB   Transfers   Sit to Stand 7  Independent   Stand to Sit 7  Independent   Ambulation/Elevation   Gait pattern Improper Weight shift;Decreased foot clearance   Gait Assistance 5  Supervision   Assistive Device Rolling walker   Distance 90 ft   Balance   Static Sitting Normal   Dynamic Sitting Normal   Static Standing Good   Dynamic Standing Fair +   Ambulatory Fair +   Endurance Deficit   Endurance Deficit Yes   Endurance Deficit Description limited ambulation distance   Activity Tolerance   Activity Tolerance Patient limited by fatigue   Assessment   Prognosis Excellent   Problem List Pain; Impaired balance;Decreased mobility; Decreased safety awareness   Assessment Pt is 82 y o  female seen for PT evaluation s/p admit to [facility] on 12/31/2020 w/ Shortness of breath  PT consulted to assess pt's functional mobility and d/c needs  Order placed for PT eval and tx, w/ up as tolerated order  Comorbidities affecting pt's physical performance at time of assessment include: COPD, thn and a fib  PTA, pt was ambulates community distances and elevations, has 3 SAVANNAH, lives alone in 1 level home and works full time  Personal factors affecting pt at time of IE include: ambulating w/ assistive device and stairs to enter home   Please find objective findings from PT assessment regarding body systems outlined above with impairments and limitations including impaired balance, gait deviations, pain, decreased activity tolerance, decreased functional mobility tolerance, decreased safety awareness, impaired judgement and fall risk  Pt's clinical presentation is currently unstable/unpredictable seen in pt's presentation of pain and abnormal lab values  Pt to benefit from continued PT tx to address deficits as defined above and maximize level of functional independent mobility and consistency  From PT/mobility standpoint, recommendation at time of d/c would be Home PT pending progress in order to facilitate return to PLOF  Barriers to Discharge Decreased caregiver support   Goals   Patient Goals to go home   LTG Expiration Date 01/12/21   Long Term Goal #1 Pt will: Perform ambulation with [facility]  (with no AD) for 250 ft toincrease Indep in home environment  Increase dynamic standing balance to F+ to decrease fall risk  I Increase OOB activity tolerance to 10 minutes without s/s of exertion to decrease fall risk  Navigate up and down 3 step with MI so patient can enter and exit home  PT Treatment Day 0   Plan   Treatment/Interventions Elevations; Endurance training;Gait training   PT Frequency   (3-5x/wk)   Recommendation   PT Discharge Recommendation Home with skilled therapy   Equipment Recommended Walker   PT - OK to Discharge Yes   Additional Comments upon conclusion pt seated EOB with all needs in reach   Kaylyn Barrios, PT          Kaylyn Barrios, PT

## 2021-01-02 NOTE — PLAN OF CARE
Problem: Potential for Falls  Goal: Patient will remain free of falls  Description: INTERVENTIONS:  - Assess patient frequently for physical needs  -  Identify cognitive and physical deficits and behaviors that affect risk of falls    -  [place] fall precautions as indicated by assessment   - Educate patient/family on patient safety including physical limitations  - Instruct patient to call for assistance with activity based on assessment  - Modify environment to reduce risk of injury  - Consider OT/PT consult to assist with strengthening/mobility  Outcome: Adequate for Discharge     Problem: PAIN - ADULT  Goal: Verbalizes/displays adequate comfort level or baseline comfort level  Description: Interventions:  - Encourage patient to monitor pain and request assistance  - Assess pain using appropriate pain scale  - Administer analgesics based on type and severity of pain and evaluate response  - Implement non-pharmacological measures as appropriate and evaluate response  - Consider cultural and social influences on pain and pain management  - Notify physician/advanced practitioner if interventions unsuccessful or patient reports new pain  Outcome: Adequate for Discharge     Problem: INFECTION - ADULT  Goal: Absence or prevention of progression during hospitalization  Description: INTERVENTIONS:  - Assess and monitor for signs and symptoms of infection  - Monitor lab/diagnostic results  - Monitor all insertion sites, i e  indwelling lines, tubes, and drains  - Monitor endotracheal if appropriate and nasal secretions for changes in amount and color  - [place] appropriate cooling/warming therapies per order  - Administer medications as ordered  - Instruct and encourage patient and family to use good hand hygiene technique  - Identify and instruct in appropriate isolation precautions for identified infection/condition  Outcome: Adequate for Discharge  Goal: Absence of fever/infection during neutropenic period  Description: INTERVENTIONS:  - Monitor WBC    Outcome: Adequate for Discharge     Problem: SAFETY ADULT  Goal: Maintain or return to baseline ADL function  Description: INTERVENTIONS:  -  Assess patient's ability to carry out ADLs; assess patient's baseline for ADL function and identify physical deficits which impact ability to perform ADLs (bathing, care of mouth/teeth, toileting, grooming, dressing, etc )  - Assess/evaluate cause of self-care deficits   - Assess range of motion  - Assess patient's mobility; develop plan if impaired  - Assess patient's need for assistive devices and provide as appropriate  - Encourage maximum independence but intervene and supervise when necessary  - Involve family in performance of ADLs  - Assess for home care needs following discharge   - Consider OT consult to assist with ADL evaluation and planning for discharge  - Provide patient education as appropriate  Outcome: Adequate for Discharge  Goal: Maintain or return mobility status to optimal level  Description: INTERVENTIONS:  - Assess patient's baseline mobility status (ambulation, transfers, stairs, etc )    - Identify cognitive and physical deficits and behaviors that affect mobility  - Identify mobility aids required to assist with transfers and/or ambulation (gait belt, sit-to-stand, lift, walker, cane, etc )  - [place] fall precautions as indicated by assessment  - Record patient progress and toleration of activity level on Mobility SBAR; progress patient to next Phase/Stage  - Instruct patient to call for assistance with activity based on assessment  - Consider rehabilitation consult to assist with strengthening/weightbearing, etc   Outcome: Adequate for Discharge     Problem: DISCHARGE PLANNING  Goal: Discharge to home or other facility with appropriate resources  Description: INTERVENTIONS:  - Identify barriers to discharge w/patient and caregiver  - Arrange for needed discharge resources and transportation as appropriate  - Identify discharge learning needs (meds, wound care, etc )  - Arrange for interpretive services to assist at discharge as needed  - Refer to Case Management Department for coordinating discharge planning if the patient needs post-hospital services based on physician/advanced practitioner order or complex needs related to functional status, cognitive ability, or social support system  Outcome: Adequate for Discharge     Problem: Knowledge Deficit  Goal: Patient/family/caregiver demonstrates understanding of disease process, treatment plan, medications, and discharge instructions  Description: Complete learning assessment and assess knowledge base    Interventions:  - Provide teaching at level of understanding  - Provide teaching via preferred learning methods  Outcome: Adequate for Discharge

## 2021-01-04 ENCOUNTER — TELEPHONE (OUTPATIENT)
Dept: INTERNAL MEDICINE CLINIC | Facility: CLINIC | Age: 86
End: 2021-01-04

## 2021-01-04 DIAGNOSIS — Z71.89 COMPLEX CARE COORDINATION: Primary | ICD-10-CM

## 2021-01-04 NOTE — TELEPHONE ENCOUNTER
Pt called, discharged from hospital, refused TCM appt, has fu with you on 1-25-21, [facility] changed some of her meds, wanted you to be aware

## 2021-01-05 ENCOUNTER — ANESTHESIA EVENT (OUTPATIENT)
Dept: GASTROENTEROLOGY | Facility: HOSPITAL | Age: 86
End: 2021-01-05

## 2021-01-05 ENCOUNTER — PATIENT OUTREACH (OUTPATIENT)
Dept: INTERNAL MEDICINE CLINIC | Facility: CLINIC | Age: 86
End: 2021-01-05

## 2021-01-06 ENCOUNTER — ANESTHESIA (OUTPATIENT)
Dept: GASTROENTEROLOGY | Facility: HOSPITAL | Age: 86
End: 2021-01-06

## 2021-01-06 ENCOUNTER — HOSPITAL ENCOUNTER (OUTPATIENT)
Dept: GASTROENTEROLOGY | Facility: HOSPITAL | Age: 86
Setting detail: OUTPATIENT SURGERY
Discharge: HOME | End: 2021-01-06
Attending: INTERNAL MEDICINE
Payer: MEDICARE

## 2021-01-06 VITALS
DIASTOLIC BLOOD PRESSURE: 63 MMHG | BODY MASS INDEX: 28.86 KG/M2 | WEIGHT: 147 LBS | HEIGHT: 60 IN | OXYGEN SATURATION: 100 % | HEART RATE: 79 BPM | SYSTOLIC BLOOD PRESSURE: 100 MMHG | RESPIRATION RATE: 18 BRPM | TEMPERATURE: 97.3 F

## 2021-01-06 VITALS — HEART RATE: 74 BPM

## 2021-01-06 DIAGNOSIS — K91.89 OTHER POSTPROCEDURAL COMPLICATIONS AND DISORDERS OF DIGESTIVE SYSTEM: ICD-10-CM

## 2021-01-06 DIAGNOSIS — R13.10 DYSPHAGIA, UNSPECIFIED: ICD-10-CM

## 2021-01-06 PROCEDURE — 88305 TISSUE EXAM BY PATHOLOGIST: CPT | Performed by: PATHOLOGY

## 2021-01-06 PROCEDURE — 88313 SPECIAL STAINS GROUP 2: CPT | Performed by: PATHOLOGY

## 2021-01-06 PROCEDURE — C1726 CATH, BAL DIL, NON-VASCULAR: HCPCS

## 2021-01-06 PROCEDURE — 88342 IMHCHEM/IMCYTCHM 1ST ANTB: CPT | Performed by: PATHOLOGY

## 2021-01-06 RX ORDER — SODIUM CHLORIDE, SODIUM LACTATE, POTASSIUM CHLORIDE, CALCIUM CHLORIDE 600; 310; 30; 20 MG/100ML; MG/100ML; MG/100ML; MG/100ML
INJECTION, SOLUTION INTRAVENOUS CONTINUOUS PRN
Status: DISCONTINUED | OUTPATIENT
Start: 2021-01-06 | End: 2021-01-06

## 2021-01-06 RX ORDER — SODIUM CHLORIDE, SODIUM LACTATE, POTASSIUM CHLORIDE, CALCIUM CHLORIDE 600; 310; 30; 20 MG/100ML; MG/100ML; MG/100ML; MG/100ML
125 INJECTION, SOLUTION INTRAVENOUS CONTINUOUS
Status: DISCONTINUED | OUTPATIENT
Start: 2021-01-06 | End: 2021-01-10 | Stop reason: HOSPADM

## 2021-01-06 RX ORDER — EPHEDRINE SULFATE 50 MG/ML
INJECTION INTRAVENOUS AS NEEDED
Status: DISCONTINUED | OUTPATIENT
Start: 2021-01-06 | End: 2021-01-06

## 2021-01-06 RX ORDER — PROPOFOL 10 MG/ML
INJECTION, EMULSION INTRAVENOUS AS NEEDED
Status: DISCONTINUED | OUTPATIENT
Start: 2021-01-06 | End: 2021-01-06

## 2021-01-06 RX ORDER — LIDOCAINE HYDROCHLORIDE 20 MG/ML
INJECTION, SOLUTION EPIDURAL; INFILTRATION; INTRACAUDAL; PERINEURAL AS NEEDED
Status: DISCONTINUED | OUTPATIENT
Start: 2021-01-06 | End: 2021-01-06

## 2021-01-06 RX ADMIN — PROPOFOL 50 MG: 10 INJECTION, EMULSION INTRAVENOUS at 11:47

## 2021-01-06 RX ADMIN — SODIUM CHLORIDE, SODIUM LACTATE, POTASSIUM CHLORIDE, AND CALCIUM CHLORIDE 125 ML/HR: .6; .31; .03; .02 INJECTION, SOLUTION INTRAVENOUS at 11:29

## 2021-01-06 RX ADMIN — PROPOFOL 30 MG: 10 INJECTION, EMULSION INTRAVENOUS at 11:51

## 2021-01-06 RX ADMIN — LIDOCAINE HYDROCHLORIDE 100 MG: 20 INJECTION, SOLUTION EPIDURAL; INFILTRATION; INTRACAUDAL; PERINEURAL at 11:43

## 2021-01-06 RX ADMIN — PROPOFOL 50 MG: 10 INJECTION, EMULSION INTRAVENOUS at 11:43

## 2021-01-06 RX ADMIN — EPHEDRINE SULFATE 10 MG: 50 INJECTION, SOLUTION INTRAVENOUS at 11:49

## 2021-01-06 RX ADMIN — SODIUM CHLORIDE, SODIUM LACTATE, POTASSIUM CHLORIDE, AND CALCIUM CHLORIDE: .6; .31; .03; .02 INJECTION, SOLUTION INTRAVENOUS at 11:19

## 2021-01-06 NOTE — ANESTHESIA POSTPROCEDURE EVALUATION
Post-Op Assessment Note    CV Status:  Stable  Pain Score: 0    Pain management: adequate     Mental Status:  Arousable   Hydration Status:  Stable   PONV Controlled:  None   Airway Patency:  Patent      Post Op Vitals Reviewed: Yes      Staff: CRNA         No complications documented      BP   133/78   Temp      Pulse  74   Resp   17   SpO2   100

## 2021-01-06 NOTE — INTERVAL H&P NOTE
H&P reviewed  After examining the patient I find no changes in the patients condition since the H&P had been written      Vitals:    01/06/21 1118   BP: 115/58   Pulse: 71   Resp: 18   Temp: 97 6 °F (36 4 °C)   SpO2: 97%

## 2021-01-06 NOTE — DISCHARGE INSTRUCTIONS
Upper Endoscopy   WHAT YOU NEED TO KNOW:   An upper endoscopy is also called an upper gastrointestinal (GI) endoscopy, or an esophagogastroduodenoscopy (EGD)  You may feel bloated, gassy, or have some abdominal discomfort after your procedure  Your throat may be sore for 24 to 36 hours  You may burp or pass gas from air that is still inside your body  DISCHARGE INSTRUCTIONS:   Call 911 if:   · You have sudden chest pain or trouble breathing  Seek care immediately if:   · You feel dizzy or faint  · You have trouble swallowing  · You have severe throat pain  · Your bowel movements are very dark or black  · Your abdomen is hard and firm and you have severe pain  · You vomit blood  Contact your healthcare provider if:   · You feel full or bloated and cannot burp or pass gas  · You have not had a bowel movement for 3 days after your procedure  · You have neck pain  · You have a fever or chills  · You have nausea or are vomiting  · You have a rash or hives  · You have questions or concerns about your endoscopy  Relieve a sore throat:  Suck on throat lozenges or crushed ice  Gargle with a small amount of warm salt water  Mix 1 teaspoon of salt and 1 cup of warm water to make salt water  Relieve gas and discomfort from bloating:  Lie on your right side with a heating pad on your abdomen  Take short walks to help pass gas  Eat small meals until bloating is relieved  Rest after your procedure:  Do not drive or make important decisions until the day after your procedure  Return to your normal activity as directed  You can usually return to work the day after your procedure  Follow up with your healthcare provider as directed:  Write down your questions so you remember to ask them during your visits  © Copyright [address] Information is for End User's use only and may not be sold, redistributed or otherwise used for commercial purposes   All illustrations and images included in CareNotes® are the copyrighted property of [employer]  or [employer]   The above information is an  only  It is not intended as medical advice for individual conditions or treatments  Talk to your doctor, nurse or pharmacist before following any medical regimen to see if it is safe and effective for you  Esophageal Dilation   WHAT YOU NEED TO KNOW:   Esophageal dilation is a procedure to widen a narrow part of your esophagus  Your healthcare provider will use a dilator (inflatable balloon or another tool that expands) to make the area wider  He may also do an endoscopy before or during your esophageal dilation  During an endoscopy, your healthcare provider will use a scope to see inside your esophagus  DISCHARGE INSTRUCTIONS:   Medicines:   · Medicines  may be given to decrease stomach acid that can irritate your esophagus  · Take your medicine as directed  Contact your healthcare provider if you think your medicine is not helping or if you have side effects  Tell him if you are allergic to any medicine  Keep a list of the medicines, vitamins, and herbs you take  Include the amounts, and when and why you take them  Bring the list or the pill bottles to follow-up visits  Carry your medicine list with you in case of an emergency  Follow up with your healthcare provider as directed:  Write down your questions so you remember to ask them during your visits  Nutrition:  You may eat foods you normally eat  Chew your food well  Eat soft foods if you still have problems swallowing  Soft foods include applesauce, bananas, cooked cereal, cottage cheese, eggs, pudding, and yogurt  Ask for more information on what types of food to eat  Contact your healthcare provider if:   · You have a fever  · You feel very full or bloated  · You have more problems swallowing food  · You have nausea or are vomiting      · You have questions or concerns about your condition or care  Seek care immediately or call 911 if:   · You vomit blood  · You are not able to swallow any food  · You have a fast heartbeat, chest pain, or sudden trouble breathing  · Your abdomen suddenly becomes tender and hard  © Copyright [employer] 2018 Information is for End User's use only and may not be sold, redistributed or otherwise used for commercial purposes  All illustrations and images included in CareNotes® are the copyrighted property of [employer] Inc  or [facility] G[employer]elle [employer]   The above information is an  only  It is not intended as medical advice for individual conditions or treatments  Talk to your doctor, nurse or pharmacist before following any medical regimen to see if it is safe and effective for you

## 2021-01-06 NOTE — ANESTHESIA PREPROCEDURE EVALUATION
Procedure:  EGD    Relevant Problems   CARDIO   (+) Atrial fibrillation with rapid ventricular response (HCC)   (+) Congestive heart failure (HCC)   (+) Paroxysmal atrial fibrillation (HCC)      GI/HEPATIC   (+) Chronic peptic ulcer of stomach   (+) Gastroesophageal reflux disease without esophagitis      MUSCULOSKELETAL   (+) Chronic right-sided low back pain without sciatica   (+) Gout   (+) Sacroiliitis (HCC)      NEURO/PSYCH   (+) Chronic anxiety   (+) Chronic pain syndrome   (+) History of angioedema   (+) History of colonic polyps   (+) Panic attack      PULMONARY   (+) Acute on chronic respiratory failure with hypoxia (HCC)   (+) COPD (chronic obstructive pulmonary disease) (HCC)   (+) Shortness of breath        Physical Exam    Airway    Mallampati score: II  TM Distance: >3 FB  Neck ROM: full     Dental   No notable dental hx upper dentures and lower dentures,     Cardiovascular  Rhythm: regular, Rate: normal, Cardiovascular exam normal    Pulmonary  Pulmonary exam normal Breath sounds clear to auscultation, Decreased breath sounds,     Other Findings        Anesthesia Plan  ASA Score- 3     Anesthesia Type- IV sedation with anesthesia with ASA Monitors  Additional Monitors:   Airway Plan:           Plan Factors-Exercise tolerance (METS): <4 METS  Chart reviewed  Existing labs reviewed  Patient summary reviewed  Patient is a current smoker  Patient instructed to abstain from smoking on day of procedure  Patient did not smoke on day of surgery  Induction- intravenous  Postoperative Plan- Plan for postoperative opioid use  Informed Consent- Anesthetic plan and risks discussed with patient  I personally reviewed this patient with the CRNA  Discussed and agreed on the Anesthesia Plan with the CRNA  Conchita Zimmer

## 2021-01-08 ENCOUNTER — PATIENT OUTREACH (OUTPATIENT)
Dept: INTERNAL MEDICINE CLINIC | Facility: CLINIC | Age: 86
End: 2021-01-08

## 2021-01-08 NOTE — PROGRESS NOTES
Outpatient Care Management Note:  Outreach call to cristian attempted  Message left for patient to please return call  Contact information left on message

## 2021-01-12 ENCOUNTER — PATIENT OUTREACH (OUTPATIENT)
Dept: INTERNAL MEDICINE CLINIC | Facility: CLINIC | Age: 86
End: 2021-01-12

## 2021-01-12 NOTE — PROGRESS NOTES
Outpatient Care Management Note:  Outreach call placed to [address]  Introduced myself and my role  She feels that she is doing fine at home and is not interested in outreach at this time

## 2021-01-13 DIAGNOSIS — M81.0 OSTEOPOROSIS, UNSPECIFIED OSTEOPOROSIS TYPE, UNSPECIFIED PATHOLOGICAL FRACTURE PRESENCE: ICD-10-CM

## 2021-01-13 RX ORDER — ALENDRONATE SODIUM 70 MG/1
TABLET ORAL
Qty: 4 TABLET | Refills: 5 | Status: SHIPPED | OUTPATIENT
Start: 2021-01-13 | End: 2021-01-01

## 2021-01-20 NOTE — PHYSICIAN ADVISOR
Current patient class: Inpatient  The patient is currently on Hospital Day: 3 at [address]      This patient was originally admitted to the hospital under observation class  After admission the patient was reevaluated and determined to require further hospitalization  The patient was then documented to require at least a 2nd midnight in the hospital  As such the patient was then expected to satisfy the 2 midnight benchmark and was therefore eligible for inpatient admission  After review of the relevant documentation, labs, vital signs and test results, the patient is appropriate for INPATIENT ADMISSION  Admission to the hospital as an inpatient is a complex decision making process which requires the practitioner to consider the patients presenting complaint, history and physical examination and all relevant testing  With this in mind, in this case, the patient was deemed appropriate for INPATIENT ADMISSION  After review of the documentation and testing available at the time of the admission I concur with this clinical determination of medical necessity  Rationale is as follows: The patient is an 82-year-old female who presented due to swelling of her lower extremities and shortness of breath  She has chronic respiratory failure with COPD  She was started on diuresis with intravenous Lasix  Observation class was the initial patient class order  The patient was transitioned to inpatient class the following day  She required continued diuresis with intravenous Lasix  Lasix dose was increased from 40 mg to 60 mg for better response  Inpatient class became appropriate given this need for continued hospital management including escalation in her dose as noted  The patient received at least five doses of intravenous diuretic before discharge    Based on her advanced age and comorbidities, a two midnight hospitalization was appropriate for necessary directed treatment for SOB/ volume overload and edema  The patients vitals on arrival were   ED Triage Vitals   Temperature Pulse Respirations Blood Pressure SpO2   12/31/20 0815 12/31/20 0803 12/31/20 0803 12/31/20 0804 12/31/20 0803   99 2 °F (37 3 °C) 70 (!) 26 133/67 99 %      Temp Source Heart Rate Source Patient Position - Orthostatic VS BP Location FiO2 (%)   12/31/20 0815 12/31/20 0803 12/31/20 0803 12/31/20 0803 --   Temporal Monitor Lying Left arm       Pain Score       12/31/20 1223       3           Past Medical History:   Diagnosis Date    Allergies     Asthma     Chronic diastolic CHF (congestive heart failure) (HCC)     Colitis     Colon polyp     COPD (chronic obstructive pulmonary disease)     Diverticulosis     DJD (degenerative joint disease)     Gait abnormality     Hypertension     Paroxysmal atrial fibrillation (HCC)      Past Surgical History:   Procedure Laterality Date    BLADDER SURGERY      COLON SURGERY      COLONOSCOPY      COLONOSCOPY W/ POLYPECTOMY N/A 1/21/2019    Procedure: COLONOSCOPY W/ POLYPECTOMY;  Surgeon: Zenaida Pham MD;  Location: [address] [place] [address] GI LAB; Service: General    FL GUIDED NEEDLE PLAC BX/ASP/INJ  9/2/2020    GASTROJEJUNOSTOMY W/ JEJUNOSTOMY TUBE N/A 2/3/2020    Procedure: Antrectomy with bilroth II Anastomosis;   Surgeon: Lorena Ardon MD;  Location:  MAIN OR;  Service: Deon Sinning JOINT Right 9/2/2020    Procedure: BLOCK / INJECTION SACROILIAC;  Surgeon: Jacques Cartagena MD;  Location: [facility] MA[facility];  Service: Pain Management     SMALL INTESTINE SURGERY  03/2020    with partial gastrectomy       The patient was admitted to the hospital at 1450 on 1/1/21 for the following diagnosis:  Shortness of breath [R06 02]  Hypokalemia [E87 6]  CHF (congestive heart failure) ([facility] [address] ) [I50 9]  Constipation [K59 00]  Bilateral swelling of feet [M79 89]  Bilateral lower extremity edema [R60 0]    Consults have been placed to:   None    Vitals: 01/01/21 2057 01/01/21 2322 01/02/21 0726 01/02/21 0730   BP: 105/55 124/61  118/60   BP Location:  Left arm  Left arm   Pulse: 62 95  62   Resp:  18  20   Temp:  97 8 °F (36 6 °C)  98 4 °F (36 9 °C)   TempSrc:  Tympanic  Tympanic   SpO2:  95% 98%    Weight:       Height:           Most recent labs:    No results for input(s): WBC, HGB, HCT, PLT, K, NA, CALCIUM, BUN, CREATININE, LIPASE, AMYLASE, INR, TROPONINI, CKTOTAL, AST, ALT, ALKPHOS, BILITOT in the last 72 hours  Scheduled Meds:  Continuous Infusions:No current facility-administered medications for this encounter  PRN Meds:      Surgical procedures (if appropriate):

## 2021-01-21 NOTE — OP NOTE
OPERATIVE REPORT  PATIENT NAME: Amina Agarwal    :  1935  MRN: [id number]  Pt Location: [facility] OR ROOM 01    SURGERY DATE: 2021    Surgeon(s) and Role:     * Molina oMore MD - Primary    Preop Diagnosis:  Chronic pain syndrome [G89 4]  Sacroiliitis ([address] ) [M46 1]    Post-Op Diagnosis Codes:     * Chronic pain syndrome [G89 4]     * Sacroiliitis (HCC) [M46 1]    Procedure(s) (LRB):  RIGHT SACROILIAC JOINT INJECTION (Right)    Specimen(s):  * No specimens in log *    Estimated Blood Loss:   Minimal    Drains:  * No LDAs found *    Anesthesia Type:   Local    Operative Indications:  Chronic pain syndrome [G89 4]  Sacroiliitis ([facility] [address] ) [M46 1]      Operative Findings:  same    Complications:   None    Procedure and Technique:  Fluoroscopically-guided injection of the right sacroiliac joint(s)    After discussing the risks, benefits, and alternatives to the procedure, the patient expressed understanding and wished to proceed  The patient was brought to the fluoroscopy suite and placed in the prone position  Procedural pause conducted to verify:  correct patient identity, procedure to be performed and as applicable, correct side and site, correct patient position, and availability of implants, special equipment and special requirements  Using fluoroscopy, the inferior portion of the right sacroiliac joint was identified  The skin was sterilely prepped and draped in the usual fashion using Chloraprep skin prep  The skin and subcutaneous tissue were anesthetized with 0 5% lidocaine  Using fluoroscopic guidance, a 3 5 inch 22 gauge spinal needle was advanced into joint  Proper needle positioning was confirmed using multiple fluoroscopic views  After negative aspiration, 0 5 mL Omnipaque 300 contrast was injected, showing intraarticular spread of contrast without any evidence of intravascular uptake    A 2 5 mL solution consisting of 40 mg of Depo-Medrol in 0 25% bupivacaine was injected slowly and incrementally into the joint  Following the injection, the needle was withdrawn slightly and flushed with lidocaine as it was fully extracted  The patient tolerated the procedure well and there were no apparent complications  After appropriate observation, the patient was dismissed from the clinic in good condition under their own power  COMMENTS  The patient received a total steroid dose of 40 mg Depo-Medrol     I was present for the entire procedure    Patient Disposition:  hemodynamically stable    SIGNATURE: Angi Pederson MD  DATE: January 21, 2021  TIME: 3:12 PM

## 2021-01-21 NOTE — INTERVAL H&P NOTE
H&P reviewed  After examining the patient I find no changes in the patients condition since the H&P had been written      Vitals:    01/21/21 1018   BP: 136/75   Pulse:    Resp:    Temp:    SpO2:

## 2021-01-21 NOTE — DISCHARGE INSTRUCTIONS
Steroid Joint Injection   WHAT YOU NEED TO KNOW:   A steroid joint injection is a procedure to inject steroid medicine into a joint  Steroid medicine decreases pain and inflammation  The injection may also contain an anesthetic (numbing medicine) to decrease pain  It may be done to treat conditions such as arthritis, gout, or carpal tunnel syndrome  The injections may be given in your knee, ankle, shoulder, elbow, wrist, ankle or sacroiliac joint  1  Do not apply heat to any area that is numb  If you have discomfort or soreness at the injection site, you may apply ice today, 20 minutes on and 20 minutes off  Tomorrow you may use ice or warm, moist heat  Do not apply ice or heat directly to the skin  2  You may have an increase or change in the discomfort for 36-48 hours after your treatment  Apply ice and continue with any pain medicine you have been prescribed  3  Do not do anything strenuous today  You may shower, but no tub baths or hot tubs today  You may resume your normal activities tomorrow, but do not overdo it  Resume normal activities slowly when you are feeling better  4  If you experience redness, drainage or swelling at the injection site, or if you develop a fever above 100 degrees, please call [facility] at [phone] or go to the Emergency Room  5  Continue to take all routine medicines prescribed by your primary care physician unless otherwise instructed by our staff  Most blood thinners should be started again according to your regularly scheduled dosing  If you have any questions, please give our office a call  If you have a problem specifically related to your procedure, please call our office at [phone]  Problems not related to your procedure should be directed to your primary care physician

## 2021-01-25 PROBLEM — K94.20: Status: ACTIVE | Noted: 2021-01-01

## 2021-01-25 NOTE — PROGRESS NOTES
Assessment/Plan:  Try to get ambulatory O2    Problem List Items Addressed This Visit        Respiratory    COPD (chronic obstructive pulmonary disease) (HCC) - Primary (Chronic)    Relevant Medications    ipratropium-albuterol (DUO-NEB) 0 5-2 5 mg/3 mL inhalation solution 3 mL    predniSONE 10 mg tablet    Other Relevant Orders    Basic metabolic panel       Cardiovascular and Mediastinum    Paroxysmal atrial fibrillation (HCC)       Other    Gastrostomy complication, unspecified ([facility] )      Other Visit Diagnoses     Chronic respiratory failure with hypoxia ([facility] )        Relevant Medications    predniSONE 10 mg tablet           Diagnoses and all orders for this visit:    Simple chronic bronchitis ([facility] )  -     ipratropium-albuterol (DUO-NEB) 0 5-2 5 mg/3 mL inhalation solution 3 mL  -     Basic metabolic panel; Future    Chronic respiratory failure with hypoxia (HCC)  -     predniSONE 10 mg tablet; Take 1 tablet (10 mg total) by mouth 2 (two) times a day with meals    Gastrostomy complication, unspecified (HCC)    Paroxysmal atrial fibrillation (HCC)        No problem-specific Assessment & Plan notes found for this encounter  Subjective: hypoxia with end stage COPD     Patient ID: Maru José is a 82 y o  female  The patient continues to have issues with hypoxia with ambulation with her O2 sat of 81% with O2 walking into the room  In addition, the patient was noted to have an O2 sat with sitting of 89%  The patient states that she would like to be taken off meds, but she is on maximal medication for her COPD  She is concerned regarding her potassium tablet used for edema ([facility] last admission)  The patient notes that she would like to stop this and we will follow up on her K+ today  She is concerned regarding her esophageal stricture  However, there is not an easy answer for this  She is hypoxic and needs O2 supplementation at all times  We ill request a ambulatory pulse ox unit for her  The following portions of the patient's history were reviewed and updated as appropriate:   She has a past medical history of Allergies, Asthma, Chronic diastolic CHF (congestive heart failure) ([facility] [address] ), Colitis, Colon polyp, COPD (chronic obstructive pulmonary disease), Diverticulosis, DJD (degenerative joint disease), Gait abnormality, Hypertension, and Paroxysmal atrial fibrillation ([facility] [address] )  ,  does not have any pertinent problems on file  ,   has a past surgical history that includes Colon surgery; Colonoscopy w/ polypectomy (N/A, 1/21/2019); Bladder surgery; Small intestine surgery (03/2020); Gastrojejunostomy w/ jejunostomy tube (N/A, 2/3/2020); Colonoscopy; pr injection,sacroiliac joint (Right, 9/2/2020); FL guided needle plac bx/asp/inj (9/2/2020); pr injection,sacroiliac joint (Right, 1/21/2021); and FL guided needle plac bx/asp/inj (1/21/2021)  ,  family history includes Heart disease in her mother  ,   reports that she quit smoking about 7 years ago  She has never used smokeless tobacco  She reports that she does not drink alcohol or use drugs  ,  is allergic to bee venom and fluticasone     Current Outpatient Medications   Medication Sig Dispense Refill    alendronate (FOSAMAX) 70 mg tablet take 1 tablet by mouth every 7 days 4 tablet 5    apixaban (ELIQUIS) 5 mg Take 1 tablet (5 mg total) by mouth 2 (two) times a day 60 tablet 5    arformoterol (Brovana) 15 mcg/2 mL nebulizer solution Inhale 15 mcg 2 (two) times a day      Artificial Tear Solution (GENTEAL TEARS) 0 1-0 2-0 3 % SOLN Administer 1 drop to both eyes 3 (three) times a day      budesonide (PULMICORT) 0 5 mg/2 mL nebulizer solution Inhale 0 5 mg 2 (two) times a day      budesonide-formoterol (Symbicort) 160-4 5 mcg/act inhaler Inhale 2 puffs 2 (two) times a day      Calcium Carb-Cholecalciferol (CALCIUM 1000 + D PO) Take 1,000 mg by mouth daily      fluticasone-umeclidinium-vilanterol (Trelegy Ellipta) 100-62 5-25 MCG/INH inhaler Inhale 1 puff daily Rinse mouth after use   3 Inhaler 1    furosemide (LASIX) 20 mg tablet Take 2 tablets (40 mg total) by mouth 2 (two) times a day 60 tablet 0    gabapentin (NEURONTIN) 300 mg capsule take 1 capsule by mouth three times a day 90 capsule 2    ipratropium-albuterol (DUO-NEB) 0 5-2 5 mg/3 mL nebulizer solution Take 1 vial (3 mL total) by nebulization every 6 (six) hours while awake 300 mL 0    iron polysaccharides (FERREX) 150 mg capsule Take 1 capsule (150 mg total) by mouth 2 (two) times a day 60 capsule 5    metoprolol tartrate (LOPRESSOR) 25 mg tablet Take 0 5 tablets (12 5 mg total) by mouth every 12 (twelve) hours 90 tablet 0    montelukast (SINGULAIR) 10 mg tablet take 1 tablet by mouth daily at bedtime 90 tablet 1    oxyCODONE-acetaminophen (PERCOCET) 5-325 mg per tablet Take 1 tablet by mouth every 6 (six) hours as needed for moderate pain Do not fill until 1/20/2021Max Daily Amount: 4 tablets 120 tablet 0    OXYGEN-HELIUM IN Inhale      pantoprazole (PROTONIX) 40 mg tablet Take 1 tablet (40 mg total) by mouth 2 (two) times a day 60 tablet 5    polyethylene glycol (MIRALAX) 17 g packet Take 17 g by mouth daily 510 g 0    potassium chloride (K-DUR,KLOR-CON) 20 mEq tablet Take 2 tablets (40 mEq total) by mouth daily 60 tablet 0    predniSONE 10 mg tablet Take 1 tablet (10 mg total) by mouth 2 (two) times a day with meals 60 tablet 5    psyllium (METAMUCIL) packet Take 1 packet by mouth 3 (three) times a day 100 packet 1    simethicone (MYLICON) 80 mg chewable tablet Chew 1 tablet (80 mg total) every 6 (six) hours as needed for flatulence 30 tablet 0    theophylline (CORTNEY-24) 200 MG 24 hr capsule Take 1 capsule (200 mg total) by mouth daily 30 capsule 0     Current Facility-Administered Medications   Medication Dose Route Frequency Provider Last Rate Last Admin    ipratropium-albuterol (DUO-NEB) 0 5-2 5 mg/3 mL inhalation solution 3 mL  3 mL Nebulization Q6H Lázaro Rodgers DO   3 mL at 01/25/21 0911       Review of Systems   Constitutional: Negative for chills, fatigue and fever  HENT: Negative  Respiratory: Positive for shortness of breath  Negative for cough and chest tightness  Cardiovascular: Negative for chest pain, palpitations and leg swelling  Gastrointestinal: Negative for abdominal pain, constipation, diarrhea, nausea and vomiting  Genitourinary: Negative  Musculoskeletal: Negative for arthralgias, back pain and myalgias  Skin: Negative  Neurological: Negative  Psychiatric/Behavioral: Negative  Objective:  Vitals:    01/25/21 0859   BP: 140/62   BP Location: Left arm   Patient Position: Sitting   Cuff Size: Adult   Pulse: 74   Resp: 18   Temp: 98 °F (36 7 °C)   TempSrc: Temporal   SpO2: (!) 81%   Weight: 66 7 kg (147 lb)   Height: 5' (1 524 m)     Body mass index is 28 71 kg/m²  Physical Exam  Vitals signs and nursing note reviewed  Constitutional:       Appearance: She is well-developed  HENT:      Head: Normocephalic and atraumatic  Eyes:      Pupils: Pupils are equal, round, and reactive to light  Neck:      Musculoskeletal: Normal range of motion and neck supple  Cardiovascular:      Rate and Rhythm: Normal rate and regular rhythm  Heart sounds: Normal heart sounds  No murmur  Pulmonary:      Effort: Pulmonary effort is normal  No respiratory distress  Breath sounds: Decreased breath sounds present  No wheezing, rhonchi or rales  Abdominal:      General: Bowel sounds are normal       Palpations: Abdomen is soft  Tenderness: There is no abdominal tenderness  Musculoskeletal: Normal range of motion  Skin:     General: Skin is warm and dry  Neurological:      Mental Status: She is alert and oriented to person, place, and time            PHQ-9 Depression Screening    PHQ-9:   Frequency of the following problems over the past two weeks:

## 2021-01-26 NOTE — TELEPHONE ENCOUNTER
Pt having really bad anxiety,she forgot to tell you about it yesterday, she looking for a medication to help

## 2021-02-06 NOTE — ED PROVIDER NOTES
History  Chief Complaint   Patient presents with    Shortness of Breath     Chronic SOB, worse this morning  HPI      This is a well-known 82-year-old female with a history of COPD wears home oxygen specifically 3 L nasal cannula presents the emergency department with a chief complaint of shortness of breath worsening this morning  PCP is Dr Carmela Thorpe: David Rico: Last seen by office on 01/25/2021  Patient has relevant medical history is as follows:  COPD on DuoNebs 0 5 mg a 2 5 mg as needed, prednisone 10 mg tablets  Patient also has a relevant history of paroxysmal atrial fibrillation, gastrostomy complications, and hx of chronic resp failure with hypoxia, asthma, CHF (Chronic diastolic CHF), Colitis, Diverticulosis, DJD, HTN  Relevant surgical hx: Colon surgery; Colonoscopy w/ polypectomy (N/A, 1/21/2019); Bladder surgery; Small intestine surgery (03/2020); Gastrojejunostomy w/ jejunostomy tube (N/A, 2/3/2020); Colonoscopy; pr injection,sacroiliac joint (Right, 9/2/2020); FL guided needle plac bx/asp/inj (9/2/2020); pr injection,sacroiliac joint (Right, 1/21/2021); and FL guided needle plac bx/asp/inj (1/21/2021)  ,  Prior to Admission Medications   Prescriptions Last Dose Informant Patient Reported? Taking?    Artificial Tear Solution (GENTEAL TEARS) 0 1-0 2-0 3 % SOLN  Self Yes No   Sig: Administer 1 drop to both eyes 3 (three) times a day   Calcium Carb-Cholecalciferol (CALCIUM 1000 + D PO)  Self Yes No   Sig: Take 1,000 mg by mouth daily   OXYGEN-HELIUM IN  Self Yes No   Sig: Inhale   alendronate (FOSAMAX) 70 mg tablet   No No   Sig: take 1 tablet by mouth every 7 days   apixaban (ELIQUIS) 5 mg  Self No No   Sig: Take 1 tablet (5 mg total) by mouth 2 (two) times a day   arformoterol (Brovana) 15 mcg/2 mL nebulizer solution  Self Yes No   Sig: Inhale 15 mcg 2 (two) times a day   budesonide (PULMICORT) 0 5 mg/2 mL nebulizer solution  Self Yes No   Sig: Inhale 0 5 mg 2 (two) times a day   budesonide-formoterol (Symbicort) 160-4 5 mcg/act inhaler  Self Yes No   Sig: Inhale 2 puffs 2 (two) times a day   fluticasone-umeclidinium-vilanterol (Trelegy Ellipta) 100-62 5-25 MCG/INH inhaler  Self No No   Sig: Inhale 1 puff daily Rinse mouth after use     furosemide (LASIX) 20 mg tablet   No No   Sig: Take 2 tablets (40 mg total) by mouth 2 (two) times a day   gabapentin (NEURONTIN) 300 mg capsule   No No   Sig: take 1 capsule by mouth three times a day   hydrOXYzine HCL (ATARAX) 25 mg tablet   No No   Sig: Take 1 tablet (25 mg total) by mouth every 6 (six) hours as needed for itching   ipratropium-albuterol (DUO-NEB) 0 5-2 5 mg/3 mL nebulizer solution  Self No No   Sig: Take 1 vial (3 mL total) by nebulization every 6 (six) hours while awake   iron polysaccharides (FERREX) 150 mg capsule  Self No No   Sig: Take 1 capsule (150 mg total) by mouth 2 (two) times a day   metoprolol tartrate (LOPRESSOR) 25 mg tablet  Self No No   Sig: Take 0 5 tablets (12 5 mg total) by mouth every 12 (twelve) hours   montelukast (SINGULAIR) 10 mg tablet  Self No No   Sig: take 1 tablet by mouth daily at bedtime   oxyCODONE-acetaminophen (PERCOCET) 5-325 mg per tablet   No No   Sig: Take 1 tablet by mouth every 6 (six) hours as needed for moderate pain Do not fill until 1/20/2021Max Daily Amount: 4 tablets   pantoprazole (PROTONIX) 40 mg tablet  Self No No   Sig: Take 1 tablet (40 mg total) by mouth 2 (two) times a day   polyethylene glycol (MIRALAX) 17 g packet   No No   Sig: Take 17 g by mouth daily   potassium chloride (K-DUR,KLOR-CON) 20 mEq tablet   No No   Sig: Take 2 tablets (40 mEq total) by mouth daily   predniSONE 10 mg tablet   No No   Sig: Take 1 tablet (10 mg total) by mouth 2 (two) times a day with meals   psyllium (METAMUCIL) packet  Self No No   Sig: Take 1 packet by mouth 3 (three) times a day   simethicone (MYLICON) 80 mg chewable tablet  Self No No   Sig: Chew 1 tablet (80 mg total) every 6 (six) hours as needed for flatulence   theophylline (CORTNEY-24) 200 MG 24 hr capsule  Self No No   Sig: Take 1 capsule (200 mg total) by mouth daily      Facility-Administered Medications: None       Past Medical History:   Diagnosis Date    Allergies     Asthma     Chronic diastolic CHF (congestive heart failure) (HCC)     Colitis     Colon polyp     COPD (chronic obstructive pulmonary disease)     Diverticulosis     DJD (degenerative joint disease)     Gait abnormality     Hypertension     Paroxysmal atrial fibrillation ([address] )        Past Surgical History:   Procedure Laterality Date    BLADDER SURGERY      COLON SURGERY      COLONOSCOPY      COLONOSCOPY W/ POLYPECTOMY N/A 1/21/2019    Procedure: COLONOSCOPY W/ POLYPECTOMY;  Surgeon: Beka Ashley MD;  Location: [address] [place] [address] GI LAB; Service: General    FL GUIDED NEEDLE PLAC BX/ASP/INJ  9/2/2020    FL GUIDED NEEDLE PLAC BX/ASP/INJ  1/21/2021    GASTROJEJUNOSTOMY W/ JEJUNOSTOMY TUBE N/A 2/3/2020    Procedure: Antrectomy with bilroth II Anastomosis; Surgeon: Thelma Bridges MD;  Location:  MAIN OR;  Service: General   [place] JOINT Right 9/2/2020    Procedure: BLOCK / INJECTION SACROILIAC;  Surgeon: Jennifer Bradley MD;  Location: [facility];  Service: Pain Management     [place] INJECTION,SACROILIAC JOINT Right 1/21/2021    Procedure: RIGHT SACROILIAC JOINT INJECTION;  Surgeon: Jeninfer Bradley MD;  Location: [facility];  Service: Pain Management     SMALL INTESTINE SURGERY  03/2020    with partial gastrectomy       Family History   Problem Relation Age of Onset    Heart disease Mother      I have reviewed and agree with the history as documented      E-Cigarette/Vaping    E-Cigarette Use Never User      E-Cigarette/Vaping Substances    Nicotine No     THC No     CBD No     Flavoring No     Other No     Unknown No      Social History     Tobacco Use    Smoking status: Former Smoker     Quit date: 11/27/2013     Years since quittin 2    Smokeless tobacco: Never Used   Substance Use Topics    Alcohol use: Never     Frequency: Never     Binge frequency: Never    Drug use: Never       Review of Systems   Constitutional: Negative  HENT: Negative  Eyes: Negative  Respiratory: Positive for cough and shortness of breath  Cardiovascular: Negative  Gastrointestinal: Negative  Endocrine: Negative  Genitourinary: Negative  Musculoskeletal: Negative  Skin: Negative  Allergic/Immunologic: Negative  Neurological: Negative  Hematological: Negative  Psychiatric/Behavioral: Negative  Physical Exam  Physical Exam  Vitals signs and nursing note reviewed  Constitutional:       General: She is not in acute distress  Appearance: She is well-developed and normal weight  HENT:      Head: Normocephalic and atraumatic  Mouth/Throat:      Mouth: Mucous membranes are moist       Pharynx: Oropharynx is clear  Eyes:      Pupils: Pupils are equal, round, and reactive to light  Neck:      Musculoskeletal: Normal range of motion and neck supple  Cardiovascular:      Rate and Rhythm: Normal rate and regular rhythm  Pulmonary:      Effort: Respiratory distress present  Breath sounds: Examination of the right-upper field reveals wheezing  Examination of the left-upper field reveals wheezing  Examination of the right-middle field reveals wheezing  Examination of the left-middle field reveals wheezing  Wheezing present  Chest:      Chest wall: No mass or deformity  Abdominal:      General: Bowel sounds are normal       Palpations: Abdomen is soft  Musculoskeletal: Normal range of motion  Skin:     General: Skin is warm  Capillary Refill: Capillary refill takes less than 2 seconds  Neurological:      General: No focal deficit present  Mental Status: She is alert and oriented to person, place, and time     Psychiatric:         Mood and Affect: Mood normal          Behavior: Behavior normal          Thought Content:  Thought content normal          Judgment: Judgment normal          Vital Signs  ED Triage Vitals   Temp Pulse Respirations Blood Pressure SpO2   -- 02/06/21 1333 02/06/21 1333 02/06/21 1752 02/06/21 1333    92 (!) 24 105/58 97 %      Temp src Heart Rate Source Patient Position - Orthostatic VS BP Location FiO2 (%)   -- 02/06/21 1333 02/06/21 1752 02/06/21 1752 --    Monitor Sitting Left arm       Pain Score       --                  Vitals:    02/06/21 1333 02/06/21 1752 02/06/21 1815   BP:  105/58    Pulse: 92 93 84   Patient Position - Orthostatic VS:  Sitting          Visual Acuity      ED Medications  Medications   albuterol inhalation solution 5 mg (5 mg Nebulization Given 2/6/21 1626)     And   ipratropium (ATROVENT) 0 02 % inhalation solution 1 mg (1 mg Nebulization Given 2/6/21 1626)       Diagnostic Studies  Results Reviewed     Procedure Component Value Units Date/Time    Protime-INR [[id number]]  (Normal) Collected: 02/06/21 1411    Lab Status: Final result Specimen: Blood from Arm, Right Updated: 02/06/21 1459     Protime 12 1 seconds      INR 0 91    APTT [[id number]]  (Normal) Collected: 02/06/21 1411    Lab Status: Final result Specimen: Blood from Arm, Right Updated: 02/06/21 1459     PTT 26 seconds     B-Type Natriuretic Peptide [facility] ONLY) [[id number]]  (Abnormal) Collected: 02/06/21 1411    Lab Status: Final result Specimen: Blood from Arm, Right Updated: 02/06/21 1443      pg/mL     Troponin I [[id number]]  (Normal) Collected: 02/06/21 1411    Lab Status: Final result Specimen: Blood from Arm, Right Updated: 02/06/21 1439     Troponin I <0 03 ng/mL     Comprehensive metabolic panel [[id number]]  (Abnormal) Collected: 02/06/21 1411    Lab Status: Final result Specimen: Blood from Arm, Right Updated: 02/06/21 1439     Sodium 140 mmol/L      Potassium 3 7 mmol/L      Chloride 102 mmol/L      CO2 30 mmol/L      ANION GAP 8 mmol/L      BUN 13 mg/dL      Creatinine 0 62 mg/dL      Glucose 106 mg/dL      Calcium 9 4 mg/dL      AST 11 U/L      ALT 13 U/L      Alkaline Phosphatase 43 U/L      Total Protein 6 4 g/dL      Albumin 4 0 g/dL      Total Bilirubin 0 30 mg/dL      eGFR 84 ml/min/1 73sq m     Narrative:      [place] guidelines for Chronic Kidney Disease (CKD):     Stage 1 with normal or high GFR (GFR > 90 mL/min/1 73 square meters)    Stage 2 Mild CKD (GFR = 60-89 mL/min/1 73 square meters)    Stage 3A Moderate CKD (GFR = 45-59 mL/min/1 73 square meters)    Stage 3B Moderate CKD (GFR = 30-44 mL/min/1 73 square meters)    Stage 4 Severe CKD (GFR = 15-29 mL/min/1 73 square meters)    Stage 5 End Stage CKD (GFR <15 mL/min/1 73 square meters)  Note: GFR calculation is accurate only with a steady state creatinine    CBC and differential [[id number]]  (Abnormal) Collected: 02/06/21 1411    Lab Status: Final result Specimen: Blood from Arm, Right Updated: 02/06/21 1420     WBC 7 80 Thousand/uL      RBC 4 26 Million/uL      Hemoglobin 10 2 g/dL      Hematocrit 33 0 %      MCV 78 fL      MCH 23 9 pg      MCHC 30 8 g/dL      RDW 17 8 %      MPV 7 9 fL      Platelets 742 Thousands/uL      Neutrophils Relative 71 %      Lymphocytes Relative 18 %      Monocytes Relative 9 %      Eosinophils Relative 1 %      Basophils Relative 1 %      Neutrophils Absolute 5 50 Thousands/µL      Lymphocytes Absolute 1 40 Thousands/µL      Monocytes Absolute 0 70 Thousand/µL      Eosinophils Absolute 0 10 Thousand/µL      Basophils Absolute 0 00 Thousands/µL                  XR chest 1 view portable   ED Interpretation by Jacqueline Benton III, DO (02/06 4666)   Under penetrated film, patient is rotated, cardiomegaly noted                   Procedures  ECG 12 Lead Documentation Only    Date/Time: 2/6/2021 2:49 PM  Performed by: Joao JanuaryDO  Authorized by: Jacqueline Benton III, DO     Indications / Diagnosis:  SOB  ECG reviewed by me, the ED Provider: yes    Patient location:  ED  Comments:      I personally reviewed this EKG is performed the patient February 6, 2021  EKG was completed at 2:35 p m  And interpreted by me at 2:37 p m  Normal sinus rhythm with no acute ST abnormalities  Please note there is evidence of low voltage QRS left axis deviation  Patient's QTC interval is 427 milliseconds  ED Course  ED Course as of Feb 06 2304   Sat Feb 06, 2021   1351 History physical completed  Orders placed  MDM  Number of Diagnoses or Management Options  COPD exacerbation [facility]):   Diagnosis management comments: This is a very pleasant well-known 79-year-old female presents the emergency department history of COPD, cough congestion over the last few days  Patient's pulse ox was anywhere from %,  After breathing treatment symptoms improved patient was able to ambulate around the room without difficulty with pulse ox 95%  Patient is stable for discharge  Portions of the record may have been created with voice recognition software  Occasional wrong word or "sound a like" substitutions may have occurred due to the inherent limitations of voice recognition software  Read the chart carefully and recognize, using context, where substitutions have occurred  Counseling: I had a detailed discussion with the patient and/or guardian regarding: the historical points, exam findings, and any diagnostic results supporting the discharge diagnosis, lab results, radiology results, discharge instructions reviewed with patient and/or family/caregiver and understanding was verbalized   Instructions given to return to the emergency department if symptoms worsen or persist, or if there are any questions or concerns that arise at home         Amount and/or Complexity of Data Reviewed  Clinical lab tests: ordered and reviewed  Tests in the radiology section of CPT®: ordered and reviewed  Tests in the medicine section of CPT®: reviewed and ordered        Disposition  Final diagnoses:   COPD exacerbation ([address] )     Time reflects when diagnosis was documented in both MDM as applicable and the Disposition within this note     Time User Action Codes Description Comment    2/6/2021  6:35 PM Sheri Love Rony [J44 1] COPD exacerbation [facility])       ED Disposition     ED Disposition Condition Date/Time Comment    Discharge Stable Sat Feb 6, 2021  6:35 PM Ania Peña discharge to home/self care              Follow-up Information     Follow up With Specialties Details Why [address], DO Internal Medicine   [address]  [phone]            Discharge Medication List as of 2/6/2021  6:44 PM      CONTINUE these medications which have NOT CHANGED    Details   alendronate (FOSAMAX) 70 mg tablet take 1 tablet by mouth every 7 days, Normal      apixaban (ELIQUIS) 5 mg Take 1 tablet (5 mg total) by mouth 2 (two) times a day, Starting Tue 8/11/2020, Normal      arformoterol (Brovana) 15 mcg/2 mL nebulizer solution Inhale 15 mcg 2 (two) times a day, Starting Thu 12/17/2020, Until Fri 12/17/2021, Historical Med      Artificial Tear Solution (GENTEAL TEARS) 0 1-0 2-0 3 % SOLN Administer 1 drop to both eyes 3 (three) times a day, Starting Fri 2/14/2020, Historical Med      budesonide (PULMICORT) 0 5 mg/2 mL nebulizer solution Inhale 0 5 mg 2 (two) times a day, Starting Thu 12/17/2020, Until Fri 12/17/2021, Historical Med      budesonide-formoterol (Symbicort) 160-4 5 mcg/act inhaler Inhale 2 puffs 2 (two) times a day, Historical Med      Calcium Carb-Cholecalciferol (CALCIUM 1000 + D PO) Take 1,000 mg by mouth daily, Starting Wed 3/28/2018, Historical Med      fluticasone-umeclidinium-vilanterol (Trelegy Ellipta) 100-62 5-25 MCG/INH inhaler Inhale 1 puff daily Rinse mouth after use , Starting Tue 8/11/2020, Normal      furosemide (LASIX) 20 mg tablet Take 2 tablets (40 mg total) by mouth 2 (two) times a day, Starting Sat 1/2/2021, Normal      gabapentin (NEURONTIN) 300 mg capsule take 1 capsule by mouth three times a day, Normal      hydrOXYzine HCL (ATARAX) 25 mg tablet Take 1 tablet (25 mg total) by mouth every 6 (six) hours as needed for itching, Starting Tue 1/26/2021, Normal      ipratropium-albuterol (DUO-NEB) 0 5-2 5 mg/3 mL nebulizer solution Take 1 vial (3 mL total) by nebulization every 6 (six) hours while awake, Starting Tue 11/12/2019, Print      iron polysaccharides (FERREX) 150 mg capsule Take 1 capsule (150 mg total) by mouth 2 (two) times a day, Starting Tue 8/11/2020, Normal      metoprolol tartrate (LOPRESSOR) 25 mg tablet Take 0 5 tablets (12 5 mg total) by mouth every 12 (twelve) hours, Starting Sat 12/5/2020, Normal      montelukast (SINGULAIR) 10 mg tablet take 1 tablet by mouth daily at bedtime, Normal      oxyCODONE-acetaminophen (PERCOCET) 5-325 mg per tablet Take 1 tablet by mouth every 6 (six) hours as needed for moderate pain Do not fill until 1/20/2021Max Daily Amount: 4 tablets, Starting Wed 12/23/2020, Normal      OXYGEN-HELIUM IN Inhale, Historical Med      pantoprazole (PROTONIX) 40 mg tablet Take 1 tablet (40 mg total) by mouth 2 (two) times a day, Starting Fri 9/4/2020, Normal      polyethylene glycol (MIRALAX) 17 g packet Take 17 g by mouth daily, Starting Sun 1/3/2021, Until Tue 2/2/2021, Normal      potassium chloride (K-DUR,KLOR-CON) 20 mEq tablet Take 2 tablets (40 mEq total) by mouth daily, Starting Sun 1/3/2021, Until Tue 2/2/2021, Normal      predniSONE 10 mg tablet Take 1 tablet (10 mg total) by mouth 2 (two) times a day with meals, Starting Mon 1/25/2021, Normal      psyllium (METAMUCIL) packet Take 1 packet by mouth 3 (three) times a day, Starting Tue 11/12/2019, Print      simethicone (MYLICON) 80 mg chewable tablet Chew 1 tablet (80 mg total) every 6 (six) hours as needed for flatulence, Starting Fri 2/14/2020, Normal      theophylline (CORTNEY-24) 200 MG 24 hr capsule Take 1 capsule (200 mg total) by mouth daily, Starting Wed 8/26/2020, Normal           No discharge procedures on file      PDMP Review       Value Time User    PDMP Reviewed  Yes 12/23/2020  9:03 AM Ileana Pac, [address]           ED Provider  Electronically Signed by           Nelida Valencia III, DO  02/06/21 5584

## 2021-02-08 NOTE — TELEPHONE ENCOUNTER
Pt needs rx furosemide (LASIX) 20 mg tablet, takes 40mg, 2 times daily,pls send to [facility]

## 2021-02-08 NOTE — TELEPHONE ENCOUNTER
Pt called-she was in the ER on Saturday because she could not breathe  They said she had bronchitis  She said they did not do anything for her  She asked if you could order something for her breathing and or the bronchitis  She cannot get a ride today

## 2021-02-09 NOTE — TELEPHONE ENCOUNTER
Pt called, very sob, has an appt tomorrow with cardiologist,pt very nervous about her breathing, asking if you will give her something to calm down, feels like she is jumping out of her skin?

## 2021-02-09 NOTE — TELEPHONE ENCOUNTER
Hydroxyzine sent to the pharmacy previously  Does she have any of that  It does not suppress her breathing and should help with anxiety  Is she using the O2

## 2021-02-10 NOTE — ASSESSMENT & PLAN NOTE
Largely related to her COPD  Additional factors include diastolic heart failure and anxiety  She reports that she was given instruction to increase her Lasix to 40 mg b i d  She has not yet started this  I expect this to help with her volume overload  She was also prescribed medication for anxiety which she plans to  later today

## 2021-02-10 NOTE — ASSESSMENT & PLAN NOTE
Wt Readings from Last 3 Encounters:   02/06/21 66 7 kg (147 lb 0 8 oz)   01/25/21 66 7 kg (147 lb)   01/06/21 66 7 kg (147 lb)     Increase Lasix to 40 mg b i d  as previously instructed by PCP   BMP in 1 week, repeat echo  Low-sodium diet

## 2021-02-10 NOTE — PROGRESS NOTES
Patient ID: Jenni Garcia is a 82 y o  female  Plan:      Shortness of breath    Largely related to her COPD  Additional factors include diastolic heart failure and anxiety  She reports that she was given instruction to increase her Lasix to 40 mg b i d  She has not yet started this  I expect this to help with her volume overload  She was also prescribed medication for anxiety which she plans to  later today  Chronic diastolic CHF (congestive heart failure) ([facility])  Wt Readings from Last 3 Encounters:   02/06/21 66 7 kg (147 lb 0 8 oz)   01/25/21 66 7 kg (147 lb)   01/06/21 66 7 kg (147 lb)     Increase Lasix to 40 mg b i d  as previously instructed by PCP   BMP in 1 week, repeat echo  Low-sodium diet        Paroxysmal atrial fibrillation ([facility])  Rate controlled   Continue low-dose metoprolol  Eliquis for stroke risk reduction    COPD (chronic obstructive pulmonary disease) ([facility])  Medications and nebulizers per PCP  Presently on increased dose of prednisone and antibiotics for acute exacerbation/bronchitis  Follow up Plan/Summary Comments:  I suspect her shortness of breath is multifactorial   She notes that she gets very anxious and this contributes  She also has known COPD with recent exacerbation  She does appear a little volume overloaded on exam     Roxanne will increase her Lasix to 40 mg b i d  and have lab work drawn in 1 week  I have set her up for an echo in the next few weeks and a follow-up appointment in 2 weeks  HPI:   I had the pleasure of meeting miriam Oliveira in the office today for a post hospital visit  She is accompanied by her friend, Shaq Hernandez  Kandi Whalen is a pleasant 37-year-old female followed by Cardiology for atrial fibrillation  She 1st developed atrial fibrillation in October of 2019  She has been maintained on low-dose beta-blocker and Eliquis for stroke risk reduction    At 1 point she was on diltiazem but this was stopped in April 2020 for unknown reasons  She also has mild diastolic dysfunction  Trinidad Yuen was evaluated in the emergency department on February 6th with an acute exacerbation of COPD  Her symptoms improved with a nebulizer treatment and she was discharged home  She states that she was told she had bronchitis for which her PCP later prescribed doxycycline and increased her prednisone dose  She states she was also told to increase her Lasix dose to 40 mg b i d  and was prescribed medication for her anxiety  She has not yet picked up her Lasix or anxiety medication from the pharmacy but plans to do so after her visit today  Today she reports many issues and is anxious at times during her visit  Her friend Kevin Trinh notes that she gets very anxious when she has to go to doctor's appointments  Grace's biggest complaint is that she feels as though she needs to expect rate mucus and is not able to  She does not really have any changes in her breathing but feels short of breath when she is anxious  She also notes chronic back pain issues  She has had intermittent tightness in her chest since Saturday which she attributes to the bronchitis  Denies any pain in her chest or palpitations  She does note worsening lower extremity edema  She does not weigh herself at home, but does follow a low-sodium diet  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  Review of Systems   10  point ROS  was otherwise non pertinent or negative except as per HPI or as below  Gait:  Presently in a wheelchair     Most recent or relevant cardiac/vascular testing:    Echo 10/7/2019 EF 60% mild LVH, mild DD  Mild AI      Objective:     /68   Pulse 76   Ht 5' (1 524 m)   LMP  (LMP Unknown)   BMI 28 72 kg/m²     PHYSICAL EXAM:    General:  Normal appearance, no acute distress  Mildly anxious  Eyes:  Anicteric  Oral mucosa: wearing a mask  Neck:  No JVD  Carotid upstrokes are brisk without bruits  No masses    Chest:  Clear to auscultation   Cardiac:  Irregularly irregular  No palpable PMI  Normal S1 and S2  No murmur gallop or rub  Abdomen:  Soft and nontender  No palpable organomegaly or aortic enlargement  Extremities: +1 RLE, +2 LLE edema  Musculoskeletal:  Symmetric  Vascular:  Femoral pulses are brisk without bruits  Popliteal pulses are intact bilaterally  Pedal pulses are intact  Neuro:  Grossly symmetric  Psych:  Alert and oriented x3      Allergies   Allergen Reactions    Bee Venom Shortness Of Breath    Fluticasone      Per pt  error       Current Outpatient Medications:     alendronate (FOSAMAX) 70 mg tablet, take 1 tablet by mouth every 7 days, Disp: 4 tablet, Rfl: 5    apixaban (ELIQUIS) 5 mg, Take 1 tablet (5 mg total) by mouth 2 (two) times a day, Disp: 60 tablet, Rfl: 5    arformoterol (Brovana) 15 mcg/2 mL nebulizer solution, Inhale 15 mcg 2 (two) times a day, Disp: , Rfl:     Artificial Tear Solution (GENTEAL TEARS) 0 1-0 2-0 3 % SOLN, Administer 1 drop to both eyes 3 (three) times a day, Disp: , Rfl:     budesonide (PULMICORT) 0 5 mg/2 mL nebulizer solution, Inhale 0 5 mg 2 (two) times a day, Disp: , Rfl:     budesonide-formoterol (Symbicort) 160-4 5 mcg/act inhaler, Inhale 2 puffs 2 (two) times a day, Disp: , Rfl:     Calcium Carb-Cholecalciferol (CALCIUM 1000 + D PO), Take 1,000 mg by mouth daily, Disp: , Rfl:     doxycycline hyclate (VIBRA-TABS) 100 mg tablet, Take 1 tablet (100 mg total) by mouth 2 (two) times a day for 7 days, Disp: 14 tablet, Rfl: 0    fluticasone-umeclidinium-vilanterol (Trelegy Ellipta) 100-62 5-25 MCG/INH inhaler, Inhale 1 puff daily Rinse mouth after use , Disp: 3 Inhaler, Rfl: 1    furosemide (LASIX) 20 mg tablet, Take 2 tablets (40 mg total) by mouth 2 (two) times a day, Disp: 60 tablet, Rfl: 0    gabapentin (NEURONTIN) 300 mg capsule, take 1 capsule by mouth three times a day, Disp: 90 capsule, Rfl: 2    hydrOXYzine HCL (ATARAX) 25 mg tablet, Take 1 tablet (25 mg total) by mouth every 6 (six) hours as needed for itching, Disp: 30 tablet, Rfl: 0    ipratropium-albuterol (DUO-NEB) 0 5-2 5 mg/3 mL nebulizer solution, Take 1 vial (3 mL total) by nebulization every 6 (six) hours while awake, Disp: 300 mL, Rfl: 0    iron polysaccharides (FERREX) 150 mg capsule, Take 1 capsule (150 mg total) by mouth 2 (two) times a day, Disp: 60 capsule, Rfl: 5    metoprolol tartrate (LOPRESSOR) 25 mg tablet, Take 0 5 tablets (12 5 mg total) by mouth every 12 (twelve) hours, Disp: 90 tablet, Rfl: 0    montelukast (SINGULAIR) 10 mg tablet, take 1 tablet by mouth daily at bedtime, Disp: 90 tablet, Rfl: 1    oxyCODONE-acetaminophen (PERCOCET) 5-325 mg per tablet, Take 1 tablet by mouth every 6 (six) hours as needed for moderate pain Do not fill until 1/20/2021Max Daily Amount: 4 tablets, Disp: 120 tablet, Rfl: 0    OXYGEN-HELIUM IN, Inhale, Disp: , Rfl:     pantoprazole (PROTONIX) 40 mg tablet, Take 1 tablet (40 mg total) by mouth 2 (two) times a day, Disp: 60 tablet, Rfl: 5    polyethylene glycol (MIRALAX) 17 g packet, Take 17 g by mouth daily, Disp: 510 g, Rfl: 0    potassium chloride (K-DUR,KLOR-CON) 20 mEq tablet, Take 2 tablets (40 mEq total) by mouth daily, Disp: 60 tablet, Rfl: 0    predniSONE 20 mg tablet, Take 1 tablet (20 mg total) by mouth 2 (two) times a day with meals, Disp: 30 tablet, Rfl: 5    psyllium (METAMUCIL) packet, Take 1 packet by mouth 3 (three) times a day, Disp: 100 packet, Rfl: 1    simethicone (MYLICON) 80 mg chewable tablet, Chew 1 tablet (80 mg total) every 6 (six) hours as needed for flatulence, Disp: 30 tablet, Rfl: 0    theophylline (CORTNEY-24) 200 MG 24 hr capsule, Take 1 capsule (200 mg total) by mouth daily, Disp: 30 capsule, Rfl: 0  Past Medical History:   Diagnosis Date    Allergies     Asthma     Chronic diastolic CHF (congestive heart failure) (HCC)     Colitis     Colon polyp     COPD (chronic obstructive pulmonary disease)     Diverticulosis     DJD (degenerative joint disease)     Gait abnormality     Hypertension     Paroxysmal atrial fibrillation [facility])      Past Surgical History:   Procedure Laterality Date    BLADDER SURGERY      COLON SURGERY      COLONOSCOPY      COLONOSCOPY W/ POLYPECTOMY N/A 2019    Procedure: COLONOSCOPY W/ POLYPECTOMY;  Surgeon: Parag Quispe MD;  Location: [facility] GI LAB; Service: General    FL GUIDED NEEDLE PLAC BX/ASP/INJ  2020    FL GUIDED NEEDLE PLAC BX/ASP/INJ  2021    GASTROJEJUNOSTOMY W/ JEJUNOSTOMY TUBE N/A 2/3/2020    Procedure: Antrectomy with bilroth II Anastomosis;   Surgeon: Lord Mauricio MD;  Location: [facility];  Service: General   Rip Eisenmenger JOINT Right 2020    Procedure: BLOCK / INJECTION SACROILIAC;  Surgeon: Glenna Dickinson MD;  Location: [facility];  Service: Pain Management     AK INJECTION,SACROILIAC JOINT Right 2021    Procedure: RIGHT SACROILIAC JOINT INJECTION;  Surgeon: Glenna Dickinson MD;  Location: [facility];  Service: Pain Management     SMALL INTESTINE SURGERY  2020    with partial gastrectomy       CMP:   Lab Results   Component Value Date     2016    K 3 7 2021    K 4 1 2016     2021     2016    CO2 30 2021    CO2 26 0 2016    BUN 13 2021    BUN 13 2016    CREATININE 0 62 2021    CREATININE 0 61 2016    GLUCOSE 77 2016    EGFR 84 2021     Lipid Profile:    Lab Results   Component Value Date    CHOL 229 2016    TRIG 204 (H) 2020    TRIG 79 2016    HDL 42 2016         Social History     Tobacco Use   Smoking Status Former Smoker    Quit date: 2013    Years since quittin 2   Smokeless Tobacco Never Used

## 2021-02-10 NOTE — PATIENT INSTRUCTIONS
Take 40 mg of lasix 2 times per day a directed by Dr Homero Kim the label on the bottle--if they are 40 mg tablets, take one tablet twice per day  If they are 20 mg tablets, you need to take 2 tablets twice per day      Blood work in 1 week

## 2021-02-10 NOTE — ASSESSMENT & PLAN NOTE
Medications and nebulizers per PCP  Presently on increased dose of prednisone and antibiotics for acute exacerbation/bronchitis

## 2021-02-12 NOTE — TELEPHONE ENCOUNTER
Pt needs rx for doxycycline hyclate (VIBRA-TABS) 100 mg tablet, has only enough until Monday, pls send to [facility]

## 2021-02-16 PROBLEM — J96.11 CHRONIC RESPIRATORY FAILURE WITH HYPOXIA (HCC): Status: ACTIVE | Noted: 2021-01-01

## 2021-02-16 PROBLEM — D64.9 ABSOLUTE ANEMIA: Status: ACTIVE | Noted: 2021-01-01

## 2021-02-16 PROBLEM — F41.9 ANXIETY: Status: ACTIVE | Noted: 2021-01-01

## 2021-02-16 NOTE — PROGRESS NOTES
Assessment/Plan:    COPD (chronic obstructive pulmonary disease) ([facility]CC)  · Continue inhalers, nebulizers, prednisone, doxycycline, singulair, theophylline  · Recent ED visit  · BNP of 120  · Lasix was increased to 40mg PO BID    Absolute anemia  · Start patient taking small orange by mouth twice daily in order to assist in aid of iron absorption  · Continue iron supplementation  · Patient has had some stable decrease in hemoglobin  · Will monitor    Anxiety  · Continue atarax prn    Chronic diastolic CHF (congestive heart failure) (HCC)  Wt Readings from Last 3 Encounters:   02/16/21 65 5 kg (144 lb 6 4 oz)   02/06/21 66 7 kg (147 lb 0 8 oz)   01/25/21 66 7 kg (147 lb)     · Improving  · Saw cardiology on 2/10/2021  · Had lasix increased to 40mg PO BID  · Lower extremity edema is improving        Paroxysmal atrial fibrillation (HCC)  · Continue eliquis, metoprolol    Chronic respiratory failure with hypoxia (HCC)  · Stable on home oxygen of 3 L NC with activity       Diagnoses and all orders for this visit:    Mixed simple and mucopurulent chronic bronchitis (HCC)    Chronic respiratory failure with hypoxia (HCC)  -     predniSONE 20 mg tablet; Take 1 tablet (20 mg total) by mouth 2 (two) times a day with meals  -     doxycycline hyclate (VIBRAMYCIN) 100 mg capsule; Take 1 capsule (100 mg total) by mouth daily    Anxiety  -     Discontinue: hydrOXYzine HCL (ATARAX) 25 mg tablet; Take 1 tablet (25 mg total) by mouth every 6 (six) hours as needed for itching  -     hydrOXYzine HCL (ATARAX) 25 mg tablet; Take 1 tablet (25 mg total) by mouth every 6 (six) hours as needed for anxiety    Other iron deficiency anemia    Chronic diastolic CHF (congestive heart failure) (HCC)    Paroxysmal atrial fibrillation (HCC)          Subjective:      Patient ID: Maru José is a 82 y o  female  Assessment/Plan    Acute exacerbation of chronic bronchitis with ED visit on 2/6/2021      Discussed diagnosis, its evaluation, treatment and usual course  All questions answered  Antibiotics per orders       Philly Banks is a 82 y o  female with known COPD who presents without exacerbation at this time  Most Current symptoms include: chronic dyspnea, inability to climb stairs, cough productive of white and yellow sputum in moderate amounts and wheezing  Symptoms began several weeks ago  Patient uses 3 pillows at night  Patient currently is on oxygen at 3 L/min per nasal cannula with activity  The following portions of the patient's history were reviewed and updated as appropriate: allergies, current medications, past family history, past medical history, past social history, past surgical history and problem list     Review of Systems  Pertinent items are noted in HPI  Jeannetta Given The following portions of the patient's history were reviewed and updated as appropriate: allergies, current medications, past family history, past medical history, past social history, past surgical history and problem list     Review of Systems   Constitutional: Negative for activity change, chills, fatigue and fever  Respiratory: Positive for cough and shortness of breath  Cough and SOB at her baseline  Cardiovascular: Negative for chest pain, palpitations and leg swelling  Improving b/l lower extremity edema   Gastrointestinal: Negative for abdominal pain  Genitourinary: Negative for difficulty urinating  Musculoskeletal: Negative for myalgias  Improved ability to ambulate  Neurological: Negative for weakness and headaches  Psychiatric/Behavioral: Negative for sleep disturbance  The patient is nervous/anxious  All other systems reviewed and are negative          Objective:      /78 (BP Location: Left arm, Patient Position: Sitting, Cuff Size: Standard)   Resp 16   Ht 5' (1 524 m)   Wt 65 5 kg (144 lb 6 4 oz)   LMP  (LMP Unknown)   SpO2 94%   BMI 28 20 kg/m²          Physical Exam  Vitals signs reviewed  Constitutional:       General: She is awake  Appearance: Normal appearance  She is well-developed  HENT:      Head: Normocephalic and atraumatic  Nose: Nose normal       Mouth/Throat:      Mouth: Mucous membranes are moist    Eyes:      Extraocular Movements: Extraocular movements intact  Neck:      Musculoskeletal: Normal range of motion  Cardiovascular:      Rate and Rhythm: Normal rate and regular rhythm  Pulses: Normal pulses  Heart sounds: Normal heart sounds  Pulmonary:      Effort: Pulmonary effort is normal       Breath sounds: Normal breath sounds  Abdominal:      General: Bowel sounds are normal       Palpations: Abdomen is soft  Musculoskeletal: Normal range of motion  Right lower leg: No edema  Left lower leg: No edema  Skin:     General: Skin is warm and dry  Neurological:      Mental Status: She is alert and oriented to person, place, and time  Psychiatric:         Attention and Perception: Attention normal          Mood and Affect: Mood normal          Speech: Speech normal          Behavior: Behavior normal  Behavior is cooperative  BMI Counseling: Body mass index is 28 2 kg/m²  The BMI is above normal  Nutrition recommendations include reducing portion sizes, decreasing overall calorie intake, 3-5 servings of fruits/vegetables daily, reducing fast food intake, consuming healthier snacks, decreasing soda and/or juice intake, moderation in carbohydrate intake, increasing intake of lean protein, reducing intake of saturated fat and trans fat and reducing intake of cholesterol  Exercise recommendations include exercising 3-5 times per week

## 2021-02-16 NOTE — ASSESSMENT & PLAN NOTE
Wt Readings from Last 3 Encounters:   02/16/21 65 5 kg (144 lb 6 4 oz)   02/06/21 66 7 kg (147 lb 0 8 oz)   01/25/21 66 7 kg (147 lb)     · Improving  · Saw cardiology on 2/10/2021  · Had lasix increased to 40mg PO BID  · Lower extremity edema is improving

## 2021-02-16 NOTE — ASSESSMENT & PLAN NOTE
· Continue inhalers, nebulizers, prednisone, doxycycline, singulair, theophylline  · Recent ED visit  · BNP of 120  · Lasix was increased to 40mg PO BID

## 2021-02-16 NOTE — ASSESSMENT & PLAN NOTE
· Start patient taking small orange by mouth twice daily in order to assist in aid of iron absorption     · Continue iron supplementation  · Patient has had some stable decrease in hemoglobin  · Will monitor

## 2021-02-17 PROBLEM — Z79.891 ENCOUNTER FOR LONG-TERM OPIATE ANALGESIC USE: Status: ACTIVE | Noted: 2021-01-01

## 2021-02-17 PROBLEM — F11.20 UNCOMPLICATED OPIOID DEPENDENCE (HCC): Status: ACTIVE | Noted: 2021-01-01

## 2021-02-17 NOTE — TELEPHONE ENCOUNTER
Left message for pt regarding labs    Will continue lasix now until her follow up appt  Will forward glucose labs to PCP for review

## 2021-02-17 NOTE — PROGRESS NOTES
Assessment:  1  Chronic pain syndrome    2  Chronic right-sided low back pain without sciatica    3  Lumbar radiculopathy    4  Compression fracture of L4 vertebra with routine healing, subsequent encounter    5  Sacroiliitis ([facility] [address] )    6  Encounter for long-term opiate analgesic use    7  Uncomplicated opioid dependence ([facility] [address] )        Plan:   While the patient was in the office today, I did have a thorough conversation regarding their chronic pain syndrome, medication management, and treatment plan options  Patient is an 62-year-old female with chronic pain syndrome related to chronic low back pain, lumbar radiculopathy, sacroiliitis, history of L4 compression fracture  She recently underwent right sacroiliac joint injection on 01/21/2021  Overall she reports that her back pain has improved by about 50-60% between the injection and her medication regimen  She has difficulty with prolonged standing  Continue Percocet 5/325 every 6 hours as needed for pain  The patient's opioid scripts were sent to their pharmacy electronically and was given a 2 month supply of prescriptions with a Do Not Fill date(s) of   02/18/2021, 03/18/2021  continue gabapentin 300 mg 3 times daily to address the neuropathic component of her pain  She did not require refill this medication during today's visit  I provided her with handouts regarding lumbar core strengthening stretching exercises that she can do at home  There are risks associated with opioid medications, including dependence, addiction and tolerance  The patient understands and agrees to use these medications only as prescribed  Potential side effects of the medications include, but are not limited to, constipation, drowsiness, addiction, impaired judgment and risk of fatal overdose if not taken as prescribed  The patient was warned against driving while taking sedation medications  Sharing medications is a felony   At this point in time, the patient is showing no signs of addiction, abuse, diversion or suicidal ideation  A urine drug screen was collected at today's office visit as part of our medication management protocol  The point of care testing results were appropriate for what was being prescribed  The specimen will be sent for confirmatory testing  The drug screen is medically necessary because the patient is either dependent on opioid medication or is being considered for opioid medication therapy and the results could impact ongoing or future treatment  The drug screen is to evaluate for the presences or absence of prescribed, non-prescribed, and/or illicit drugs/substances  [place] Prescription Drug Monitoring Program report was reviewed and was appropriate       History of Present Illness: The patient is a 82 y o  female who presents for a follow up office visit in regards to Back Pain  The patients current symptoms include   Complaints of low back pain  Current pain level is a 6/10  Quality pain is described as cramping, pressure-like, pins and needles  Current pain medications includes:   Percocet 5/325 every 6 hours as needed for pain, gabapentin 300 mg 3 times daily   The patient reports that this regimen is providing 50-60% pain relief  The patient is reporting no side effects from this pain medication regimen  I have personally reviewed and/or updated the patient's past medical history, past surgical history, family history, social history, current medications, allergies, and vital signs today  Review of Systems  Review of Systems   Musculoskeletal: Positive for back pain and gait problem  All other systems reviewed and are negative          Past Medical History:   Diagnosis Date    Allergies     Asthma     Chronic diastolic CHF (congestive heart failure) (HCC)     Colitis     Colon polyp     COPD (chronic obstructive pulmonary disease)     Diverticulosis     DJD (degenerative joint disease)     Gait abnormality  Hypertension     Paroxysmal atrial fibrillation [facility])        Past Surgical History:   Procedure Laterality Date    BLADDER SURGERY      COLON SURGERY      COLONOSCOPY      COLONOSCOPY W/ POLYPECTOMY N/A 2019    Procedure: COLONOSCOPY W/ POLYPECTOMY;  Surgeon: Joel Stockton MD;  Location: [facility]; Service: General    FL GUIDED NEEDLE PLAC BX/ASP/INJ  2020    FL GUIDED NEEDLE PLAC BX/ASP/INJ  2021    GASTROJEJUNOSTOMY W/ JEJUNOSTOMY TUBE N/A 2/3/2020    Procedure: Antrectomy with bilroth II Anastomosis;   Surgeon: Arabella Marques MD;  Location: [facility];  Service: General   Urban Noe JOINT Right 2020    Procedure: BLOCK / INJECTION SACROILIAC;  Surgeon: Monster Caba MD;  Location: [facility];  Service: Pain Management     MO INJECTION,SACROILIAC JOINT Right 2021    Procedure: RIGHT SACROILIAC JOINT INJECTION;  Surgeon: Monster Caba MD;  Location: [facility];  Service: Pain Management     SMALL INTESTINE SURGERY  2020    with partial gastrectomy       Family History   Problem Relation Age of Onset    Heart disease Mother        Social History     Occupational History    Occupation: retired    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   Tobacco Use    Smoking status: Former Smoker     Quit date: 2013     Years since quittin 2    Smokeless tobacco: Never Used   Substance and Sexual Activity    Alcohol use: Never     Frequency: Never     Binge frequency: Never    Drug use: Never    Sexual activity: Not Currently         Current Outpatient Medications:     alendronate (FOSAMAX) 70 mg tablet, take 1 tablet by mouth every 7 days, Disp: 4 tablet, Rfl: 5    apixaban (ELIQUIS) 5 mg, Take 1 tablet (5 mg total) by mouth 2 (two) times a day, Disp: 60 tablet, Rfl: 5    arformoterol (Brovana) 15 mcg/2 mL nebulizer solution, Inhale 15 mcg 2 (two) times a day, Disp: , Rfl:     Artificial Tear Solution (GENTEAL TEARS) 0 1-0 2-0 3 % SOLN, Administer 1 drop to both eyes 3 (three) times a day, Disp: , Rfl:     budesonide (PULMICORT) 0 5 mg/2 mL nebulizer solution, Inhale 0 5 mg 2 (two) times a day, Disp: , Rfl:     budesonide-formoterol (Symbicort) 160-4 5 mcg/act inhaler, Inhale 2 puffs 2 (two) times a day, Disp: , Rfl:     Calcium Carb-Cholecalciferol (CALCIUM 1000 + D PO), Take 1,000 mg by mouth daily, Disp: , Rfl:     doxycycline hyclate (VIBRAMYCIN) 100 mg capsule, Take 1 capsule (100 mg total) by mouth daily, Disp: 30 capsule, Rfl: 0    Ferrex 150 150 MG capsule, take 1 capsule by mouth twice a day, Disp: 60 capsule, Rfl: 5    fluticasone-umeclidinium-vilanterol (Trelegy Ellipta) 100-62 5-25 MCG/INH inhaler, Inhale 1 puff daily Rinse mouth after use , Disp: 3 Inhaler, Rfl: 1    furosemide (LASIX) 20 mg tablet, Take 2 tablets (40 mg total) by mouth 2 (two) times a day, Disp: 60 tablet, Rfl: 0    gabapentin (NEURONTIN) 300 mg capsule, take 1 capsule by mouth three times a day (Patient taking differently: Take one capsule daily), Disp: 90 capsule, Rfl: 2    hydrOXYzine HCL (ATARAX) 25 mg tablet, Take 1 tablet (25 mg total) by mouth every 6 (six) hours as needed for anxiety, Disp: 90 tablet, Rfl: 0    ipratropium-albuterol (DUO-NEB) 0 5-2 5 mg/3 mL nebulizer solution, Take 1 vial (3 mL total) by nebulization every 6 (six) hours while awake, Disp: 300 mL, Rfl: 0    metoprolol tartrate (LOPRESSOR) 25 mg tablet, Take 0 5 tablets (12 5 mg total) by mouth every 12 (twelve) hours, Disp: 90 tablet, Rfl: 0    montelukast (SINGULAIR) 10 mg tablet, take 1 tablet by mouth daily at bedtime, Disp: 90 tablet, Rfl: 1    oxyCODONE-acetaminophen (PERCOCET) 5-325 mg per tablet, Take 1 tablet by mouth every 6 (six) hours as needed for moderate pain Do not fill until 3/18/2021Max Daily Amount: 4 tablets, Disp: 120 tablet, Rfl: 0    OXYGEN-HELIUM IN, Inhale, Disp: , Rfl:     pantoprazole (PROTONIX) 40 mg tablet, Take 1 tablet (40 mg total) by mouth 2 (two) times a day, Disp: 60 tablet, Rfl: 5    polyethylene glycol (MIRALAX) 17 g packet, Take 17 g by mouth daily, Disp: 510 g, Rfl: 0    potassium chloride (K-DUR,KLOR-CON) 20 mEq tablet, Take 2 tablets (40 mEq total) by mouth daily, Disp: 60 tablet, Rfl: 0    predniSONE 20 mg tablet, Take 1 tablet (20 mg total) by mouth 2 (two) times a day with meals, Disp: 30 tablet, Rfl: 5    psyllium (METAMUCIL) packet, Take 1 packet by mouth 3 (three) times a day, Disp: 100 packet, Rfl: 1    simethicone (MYLICON) 80 mg chewable tablet, Chew 1 tablet (80 mg total) every 6 (six) hours as needed for flatulence, Disp: 30 tablet, Rfl: 0    theophylline (CORTNEY-24) 200 MG 24 hr capsule, Take 1 capsule (200 mg total) by mouth daily, Disp: 30 capsule, Rfl: 0    Allergies   Allergen Reactions    Bee Venom Shortness Of Breath    Fluticasone      Per pt  error       Physical Exam:    /78   Pulse 76   Ht 5' (1 524 m)   Wt 65 3 kg (144 lb)   LMP  (LMP Unknown)   BMI 28 12 kg/m²     Constitutional:normal, well developed, well nourished, alert, in no distress and non-toxic and no overt pain behavior  Eyes:anicteric  HEENT:grossly intact  Neck:supple, symmetric, trachea midline and no masses   Pulmonary:even and unlabored  Cardiovascular:No edema or pitting edema present  Skin:Normal without rashes or lesions and well hydrated  Psychiatric:Mood and affect appropriate  Neurologic:Cranial Nerves II-XII grossly intact  Musculoskeletal:in wheelchair    Imaging  No orders to display       No orders of the defined types were placed in this encounter

## 2021-02-17 NOTE — PATIENT INSTRUCTIONS
Core Strengthening Exercises   WHAT YOU NEED TO KNOW:   Your core includes the muscles of your lower back, hip, pelvis, and abdomen  Core strengthening exercises help heal and strengthen these muscles  This helps prevent another injury, and keeps your pelvis, spine, and hips in the correct position  DISCHARGE INSTRUCTIONS:   Contact your healthcare provider if:   · You have sharp or worsening pain during exercise or at rest     · You have questions or concerns about your shoulder exercises  Safety tips:  Talk to your healthcare provider before you start an exercise program  A physical therapist can teach you how to do core strengthening exercises safely  · Do the exercises on a mat or firm surface  A firm surface will support your spine and prevent low back pain  Do not do these exercises on a bed  · Move slowly and smoothly  Avoid fast or jerky motions  · Stop if you feel pain  Core exercises should not be painful  Stop if you feel pain  · Breathe normally during core exercises  Do not hold your breath  This may cause an increase in blood pressure and prevent muscle strengthening  Your healthcare provider will tell you when to inhale and exhale during the exercise  · Begin all of your exercises with abdominal bracing  Abdominal bracing helps warm up your core muscles  You can also practice abdominal bracing throughout the day  Lie on your back with your knees bent and feet flat on the floor  Place your arms in a relaxed position beside your body  Tighten your abdominal muscles  Pull your belly button in and up toward your spine  Hold for 5 seconds  Relax your muscles  Repeat 10 times  Core strengthening exercises: Your healthcare provider will tell you how often to do these exercises  The provider will also tell you how many repetitions of each exercise you should do  Hold each exercise for 5 seconds or as directed  As you get stronger, increase your hold to 10 to 15 seconds   You can do some of these exercises on a stability ball, or with a weight  Ask your healthcare provider how to use a stability ball or weight for these exercises:  · Bridging:  Lie on your back with your knees bent and feet flat on the floor  Rest your arms at your side  Tighten your buttocks, and then lift your hips 1 inch off the floor  Hold for 5 seconds  When you can do this exercise without pain for 10 seconds, increase the distance you lift your hips  A good goal is to be able to lift your hips so that your shoulders, hips, and knees are in a straight line  · Dead bug:  Lie on your back with your knees bent and feet flat on the floor  Place your arms in a relaxed position beside your body  Begin with abdominal bracing  Next, raise one leg, keeping your knee bent  Hold for 5 seconds  Repeat with the other leg  When you can do this exercise without pain for 10 to 15 seconds, you may raise one straight leg and hold  Repeat with the other leg  · Quadruped:  Place your hands and knees on the floor  Keep your wrists directly below your shoulders and your knees directly below your hips  Pull your belly button in toward your spine  Do not flatten or arch your back  Tighten your abdominal muscles below your belly button  Hold for 5 seconds  When you can do this exercise without pain for 10 to 15 seconds, you may extend one arm and hold  Repeat on the other side  · Side bridge exercises:      ? Standing side bridge:  Stand next to a wall and extend one arm toward the wall  Place your palm flat on the wall with your fingers pointing upward  Begin with abdominal bracing  Next, without moving your feet, slowly bend your arm to 90 degrees  Hold for 5 seconds  Repeat on the other side  When you can do this exercise without pain for 10 to 15 seconds, you may do the bent leg side bridge on the floor  ? Bent leg side bridge:  Lie on one side with your legs, hips, and shoulders in a straight line   Prop yourself up onto your forearm so your elbow is directly below your shoulder  Bend your knees back to 90 degrees  Begin with abdominal bracing  Next, lift your hips and balance yourself on your forearm and knees  Hold for 5 seconds  Repeat on the other side  When you can do this exercise without pain for 10 to 15 seconds, you may do the straight leg side bridge on the floor  ? Straight leg side bridge:  Lie on one side with your legs, hips, and shoulders in a straight line  Prop yourself up onto your forearm so your elbow is directly below your shoulder  Begin with abdominal bracing  Lift your hips off the floor and balance yourself on your forearm and the outside of your flexed foot  Do not let your ankle bend sideways  Hold for 5 seconds  Repeat on the other side  When you can do this exercise without pain for 10 to 15 seconds, ask your healthcare provider for more advanced exercises  · Superman:  Lie on your stomach  Extend your arms forward on the floor  Tighten your abdominal muscles and lift your right hand and left leg off the floor  Hold this position  Slowly return to the starting position  Tighten your abdominal muscles and lift your left hand and right leg off the floor  Hold this position  Slowly return to the starting position  · Clam:  Lie on your side with your knees bent  Put your bottom arm under your head to keep your neck in line  Put your top hand on your hip to keep your pelvis from moving  Put your heels together, and keep them together during this exercise  Slowly raise your top knee toward the ceiling  Then lower your leg so your knees are together  Repeat this exercise 10 times  Then switch sides and do the exercise 10 times with the other leg  · Curl up:  Lie on your back with your knees bent and feet flat on the floor  Place your hands, palms down, underneath your lower back   Next, with your elbows on the floor, lift your shoulders and chest 2 to 3 inches off the floor  Keep your head in line with your shoulders  Hold this position  Slowly return to the starting position  · Straight leg raises:  Lie on your back with one leg straight  Bend the other knee and place your foot flat on the floor  Tighten your abdominal muscles  Keep your leg straight and slowly lift it straight up 6 to 12 inches off the floor  Hold this position  Lower your leg slowly  Do as many repetitions as directed on this side  Repeat with the other leg  · Plank:  Lie on your stomach  Bend your elbows and place your forearms flat on the floor  Lift your chest, stomach, and knees off the floor  Make sure your elbows are below your shoulders  Your body should be in a straight line  Do not let your hips or lower back sink to the ground  Squeeze your abdominal muscles together and hold for 15 seconds  To make this exercise harder, hold for 30 seconds or lift 1 leg at a time  · Bicycles:  Lie on your back  Bend both knees and bring them toward your chest  Your calves should be parallel to the floor  Place the palms of your hands on the back of your head  Straighten your right leg and keep it lifted 2 inches off the floor  Raise your head and shoulders off the floor and twist towards your left  Keep your head and shoulders lifted  Bend your right knee while you straighten your left leg  Keep your left leg 2 inches off the floor  Twist your head and chest towards the left leg  Continue to straighten 1 leg at a time and twist        Follow up with your healthcare provider as directed:  Write down your questions so you remember to ask them during your visits  © Copyright [address] Information is for End User's use only and may not be sold, redistributed or otherwise used for commercial purposes  All illustrations and images included in CareNotes® are the copyrighted property of [employer]  or [address] [place], [place] [address]bekah Painter   The above information is an  only   It is not intended as medical advice for individual conditions or treatments  Talk to your doctor, nurse or pharmacist before following any medical regimen to see if it is safe and effective for you  Percocet refill dates:  02/18/2021, 03/18/2021    Opioid Safety   WHAT YOU NEED TO KNOW:   An opioid medicine is used to treat pain  Examples are oxycodone, morphine, fentanyl, or codeine  Pain control and management may help you rest, heal, and return to your daily activities  You and your family will receive information about how to manage your pain at home  The instructions will include what to do if you have side effects as your pain is managed  You will get information on how to handle opioid medicine safely  You will also get suggestions on how to control pain without opioids  It is important to follow all instructions so your pain is managed effectively  DISCHARGE INSTRUCTIONS:   Call your local emergency number (241 in the [place]), or have someone else call if:   · You have a seizure  · You cannot be woken  · You have trouble staying awake and your breathing is slow or shallow  · Your speech is slurred, or you are confused  · You are dizzy or stumble when you walk  Call your doctor, or have someone close to you call if:   · You are extremely drowsy, or you have trouble staying awake or speaking  · You have pale or clammy skin  · You have blue fingernails or lips  · Your heartbeat is slower than normal     · You cannot stop vomiting  · You have questions or concerns about your condition or care  Use opioids safely:   · Take prescribed opioids exactly as directed  Opioids come with directions based on the kind and how it is given  Talk to your healthcare provider or a pharmacist if you have any questions  Do not take more than the recommended amount  Too much can cause a life-threatening overdose  Do not continue to take it after your pain stops  You may develop tolerance   This means you keep needing higher doses to get the same effect  You may also develop opioid use disorder  This means you are not able to control your opioid use  · Do not give opioids to others or take opioids that belong to someone else  The kind or amount one person takes may not be right for another  The person you share them with may also be taking medicines that do not mix with opioids  He or she may drink alcohol or use other drugs that can cause life-threatening problems when mixed with opioids  · Do not mix opioids with other medicines or alcohol  The combination can cause an overdose, or cause you to stop breathing  Alcohol, sleeping pills, and medicines such as antihistamines can make you sleepy  A combination with opioids can lead to a coma  · Do not drive or operate heavy machinery after you use an opioid  You may feel drowsy or have trouble concentrating  You can injure yourself or others if you drive or use heavy machinery when you are not alert  Your provider or pharmacist can tell you how long to wait after a dose before you do these activities  · Talk to your healthcare provider if you have any side effects  Side effects include nausea, sleepiness, itching, and trouble thinking clearly  Your provider may need to make changes to the kind or amount of opioid you are taking  He or she can also help you find ways to prevent or relieve side effects  Manage constipation:  Constipation is the most common side effect of opioid medicine  Constipation is when you have hard, dry bowel movements, or you go longer than usual between bowel movements  Tell your healthcare provider about all changes in your bowel movements while you are taking opioids  He or she may recommend laxative medicine to help you have a bowel movement  He or she may also change the kind of opioid you are taking, or change when you take it   The following are more ways you can prevent or relieve constipation:  · Drink liquids as directed  You may need to drink extra liquids to help soften and move your bowels  Ask how much liquid to drink each day and which liquids are best for you  · Eat high-fiber foods  This may help decrease constipation by adding bulk to your bowel movements  High-fiber foods include fruits, vegetables, whole-grain breads and cereals, and beans  Your healthcare provider or dietitian can help you create a high-fiber meal plan  Your provider may also recommend a fiber supplement if you cannot get enough fiber from food  · Exercise regularly  Regular physical activity can help stimulate your intestines  Walking is a good exercise to prevent or relieve constipation  Ask which exercises are best for you  · Schedule a time each day to have a bowel movement  This may help train your body to have regular bowel movements  Bend forward while you are on the toilet to help move the bowel movement out  Sit on the toilet for at least 10 minutes, even if you do not have a bowel movement  Store opioids safely:   · Store opioids where others cannot easily get them  Keep them in a locked cabinet or secure area  Do not  keep them in a purse or other bag you carry with you  A person may be looking for something else and find the opioids  · Make sure opioids are stored out of the reach of children  A child can easily overdose on opioids  Opioids may look like candy to a small child  The best way to dispose of opioids: The laws vary by country and area  In the [place], the best way is to return the opioids through a take-back program  This program is offered by the [employer] [facility])  The following are options for using the program:  · Take the opioids to a [facility] collection site  The site is often a law enforcement center  Call your local law enforcement center for scheduled take-back days in your area   You will be given information on where to go if the collection site is in a different location  · Take the opioids to an approved pharmacy or hospital   A pharmacy or hospital may be set up as a collection site  You will need to ask if it is a [facility] collection site if you were not directed there  A pharmacy or doctor's office may not be able to take back opioids unless it is a [facility] site  · Use a mail-back system  This means you are given containers to put the opioids into  You will then mail them in the containers  · Use a take-back drop box  This is a place to leave the opioids at any time  People and animals will not be able to get into the box  Your local law enforcement agency can tell you where to find a drop box in your area  Other safe ways to dispose of opioids: The medicine may come with disposal instructions  The instructions may vary depending on the brand of medicine you are using  Instructions may come in a Medication Guide, but not every medicine has one  You may instead get instructions from your pharmacy or doctor  Follow instructions carefully  The following are general guidelines to follow:  · Find out if you can flush the opioid  Some opioids can be flushed down the toilet or poured into the sink  You will need to contact authorities in your area to see if this is an option for you  The FDA also offers a list of medicines that are safe to flush down the toilet  You can check the list if you cannot get the information for your local area  · Ask your waste management company about rules for putting opioids in the trash  The company will be able to give you specific directions  Scratch out personal information on the original medicine label so it cannot be read  Then put it in the trash  Do not label the trash or put any information on it about the opioids  It should look like regular household trash so no one is tempted to look for the opioids  Keep the trash out of the reach of children and animals  Always make sure trash is secure      · Talk to officials if you live in a facility  If you live in a nursing home or assisted living center, talk to an official  The person will know the rules for your area  Other ways to manage pain:   · Ask your healthcare provider about non-opioid medicines to control pain  Some medicines may even work better than opioids, depending on the cause of your pain  Nonprescription medicines include NSAIDs (such as ibuprofen) and acetaminophen  Prescription medicines include muscle relaxers, antidepressants, and steroids  · Pain may be managed without any medicines  Some ways to relieve pain include massage, aromatherapy, or meditation  Physical or occupational therapy may also help  For more information:   · Drug Enforcement Administration  [address] [facility] [address]  Phone: 6- 468 - [phone]  Web Address: [email or web address]/drug_[email or web address]    · [address]  and Drug Administration  [place] Sabrina Grimm , [address]  Phone: 2- 835 - [phone]  Web Address: [email or web address]  Follow up with your doctor or pain specialist as directed: You may need to have your dose adjusted  Your doctor or pain specialist can also help you find ways to manage pain without opioids  Write down your questions so you remember to ask them during your visits  © Copyright [address] [place] [address] Drive Information is for End User's use only and may not be sold, redistributed or otherwise used for commercial purposes  All illustrations and images included in CareNotes® are the copyrighted property of [employer] , Inc  or [facility] Giselle Painter   The above information is an  only  It is not intended as medical advice for individual conditions or treatments  Talk to your doctor, nurse or pharmacist before following any medical regimen to see if it is safe and effective for you

## 2021-02-23 NOTE — PROGRESS NOTES
Virtual Brief Visit    Assessment/Plan:  Ngozi Swan is doing well since her last visit  She has been taking Lasix b i d  and states that her edema has improved  We will continue this without change  She is scheduled to see her PCP in the next week  I will arrange for her to follow up either in our office or by virtual visit in 2 months  Problem List Items Addressed This Visit     None                Reason for visit is   Chief Complaint   Patient presents with    Congestive Heart Failure    Virtual Brief Visit        Encounter provider Nabila Virgen    Provider located at [address] [id number]    Recent Visits  Date Type Provider Dept   02/22/21 Telephone [facility]    02/17/21 Telephone Gregg , [address] [facility] recent visits within past 7 days and meeting all other requirements     Today's Visits  Date Type Provider Dept   02/23/21 Michael 41, LEEROY [facility]   Showing today's visits and meeting all other requirements     Future Appointments  No visits were found meeting these conditions  Showing future appointments within next 150 days and meeting all other requirements        After connecting through [employer] and patient was informed that this is a secure, HIPAA-compliant platform  She agrees to proceed  , the patient was identified by name and date of birth  Juanito Hernandezyesimarychuy was informed that this is a telemedicine visit and that the visit is being conducted through [facility] and patient was informed that this is a secure, HIPAA-compliant platform  She agrees to proceed     My office door was closed  No one else was in the room  She acknowledged consent and understanding of privacy and security of the platform   The patient has agreed to participate and understands she can discontinue the visit at any time  Patient is aware this is a billable service  Carmen Guzman is a 82 y o  female   That was evaluated by virtual visit today  Daisy was last seen on February 10th in our office for evaluation of her shortness of breath and diastolic heart failure  At that time she was volume overloaded and instructed by her PCP to increase Lasix to 40 mg b i d  She notes that since having done that, her lower extremity edema has improved  She has minimal edema per her report in her ankle area, it was previously to her knees  She states that she is walking better and her breathing has slightly improved  She always has some degree of exertional dyspnea due to her  COPD and asthma  She had blood work done and an echocardiogram, we reviewed the results of both of these studies  She denies any chest pain or pressure  Past Medical History:   Diagnosis Date    Allergies     Asthma     Chronic diastolic CHF (congestive heart failure) (HCC)     Colitis     Colon polyp     COPD (chronic obstructive pulmonary disease)     Diverticulosis     DJD (degenerative joint disease)     Gait abnormality     Hypertension     Paroxysmal atrial fibrillation (HCC)        Past Surgical History:   Procedure Laterality Date    BLADDER SURGERY      COLON SURGERY      COLONOSCOPY      COLONOSCOPY W/ POLYPECTOMY N/A 1/21/2019    Procedure: COLONOSCOPY W/ POLYPECTOMY;  Surgeon: Elisabeth Higginbotham MD;  Location: [facility] GI LAB; Service: General    FL GUIDED NEEDLE PLAC BX/ASP/INJ  9/2/2020    FL GUIDED NEEDLE PLAC BX/ASP/INJ  1/21/2021    GASTROJEJUNOSTOMY W/ JEJUNOSTOMY TUBE N/A 2/3/2020    Procedure: Antrectomy with bilroth II Anastomosis;   Surgeon: Iesha Forrest MD;  Location: [facility] OR;  Service: Thalia Lager JOINT Right 9/2/2020    Procedure: BLOCK / INJECTION SACROILIAC;  Surgeon: Mei Gerard MD;  Location: [facility] OR;  Service: Pain Management     WV INJECTION,SACROILIAC JOINT Right 1/21/2021    Procedure: RIGHT SACROILIAC JOINT INJECTION;  Surgeon: Marc Main MD;  Location: [facility];  Service: Pain Management     SMALL INTESTINE SURGERY  03/2020    with partial gastrectomy       Current Outpatient Medications   Medication Sig Dispense Refill    alendronate (FOSAMAX) 70 mg tablet take 1 tablet by mouth every 7 days 4 tablet 5    apixaban (ELIQUIS) 5 mg Take 1 tablet (5 mg total) by mouth 2 (two) times a day 60 tablet 5    arformoterol (Brovana) 15 mcg/2 mL nebulizer solution Inhale 15 mcg 2 (two) times a day      Artificial Tear Solution (GENTEAL TEARS) 0 1-0 2-0 3 % SOLN Administer 1 drop to both eyes 3 (three) times a day      budesonide (PULMICORT) 0 5 mg/2 mL nebulizer solution Inhale 0 5 mg 2 (two) times a day      budesonide-formoterol (Symbicort) 160-4 5 mcg/act inhaler Inhale 2 puffs 2 (two) times a day      Calcium Carb-Cholecalciferol (CALCIUM 1000 + D PO) Take 1,000 mg by mouth daily      doxycycline hyclate (VIBRAMYCIN) 100 mg capsule Take 1 capsule (100 mg total) by mouth daily 30 capsule 0    Ferrex 150 150 MG capsule take 1 capsule by mouth twice a day 60 capsule 5    fluticasone-umeclidinium-vilanterol (Trelegy Ellipta) 100-62 5-25 MCG/INH inhaler Inhale 1 puff daily Rinse mouth after use   3 Inhaler 1    furosemide (LASIX) 20 mg tablet Take 2 tablets (40 mg total) by mouth 2 (two) times a day 60 tablet 0    gabapentin (NEURONTIN) 300 mg capsule take 1 capsule by mouth three times a day (Patient taking differently: 300 mg 3 (three) times a day ) 90 capsule 2    hydrOXYzine HCL (ATARAX) 25 mg tablet Take 1 tablet (25 mg total) by mouth every 6 (six) hours as needed for anxiety 90 tablet 0    ipratropium-albuterol (DUO-NEB) 0 5-2 5 mg/3 mL nebulizer solution Take 1 vial (3 mL total) by nebulization every 6 (six) hours while awake 300 mL 0    metoprolol tartrate (LOPRESSOR) 25 mg tablet Take 0 5 tablets (12 5 mg total) by mouth every 12 (twelve) hours 90 tablet 0    montelukast (SINGULAIR) 10 mg tablet take 1 tablet by mouth daily at bedtime 90 tablet 1    oxyCODONE-acetaminophen (PERCOCET) 5-325 mg per tablet Take 1 tablet by mouth every 6 (six) hours as needed for moderate pain Do not fill until 3/18/2021Max Daily Amount: 4 tablets 120 tablet 0    OXYGEN-HELIUM IN Inhale      pantoprazole (PROTONIX) 40 mg tablet Take 1 tablet (40 mg total) by mouth 2 (two) times a day 60 tablet 5    polyethylene glycol (MIRALAX) 17 g packet Take 17 g by mouth daily 510 g 0    potassium chloride (K-DUR,KLOR-CON) 20 mEq tablet Take 2 tablets (40 mEq total) by mouth daily 60 tablet 0    predniSONE 20 mg tablet Take 1 tablet (20 mg total) by mouth 2 (two) times a day with meals 30 tablet 5    psyllium (METAMUCIL) packet Take 1 packet by mouth 3 (three) times a day 100 packet 1    simethicone (MYLICON) 80 mg chewable tablet Chew 1 tablet (80 mg total) every 6 (six) hours as needed for flatulence 30 tablet 0    theophylline (CORTNEY-24) 200 MG 24 hr capsule Take 1 capsule (200 mg total) by mouth daily 30 capsule 0     No current facility-administered medications for this visit  Allergies   Allergen Reactions    Bee Venom Shortness Of Breath    Fluticasone      Per pt  error       Review of Systems    Vitals:         I spent 13 minutes directly with the patient during this visit    VIRTUAL VISIT DISCLAIMER    Ivet Chang acknowledges that she has consented to an online visit or consultation  She understands that the online visit is based solely on information provided by her, and that, in the absence of a face-to-face physical evaluation by the physician, the diagnosis she receives is both limited and provisional in terms of accuracy and completeness  This is not intended to replace a full medical face-to-face evaluation by the physician   Ivet Chang understands and accepts these terms

## 2021-03-02 NOTE — ASSESSMENT & PLAN NOTE
Wt Readings from Last 3 Encounters:   03/02/21 64 3 kg (141 lb 12 8 oz)   02/17/21 65 3 kg (144 lb)   02/16/21 65 5 kg (144 lb 6 4 oz)     · Continue with lasix and potassium supplements

## 2021-03-02 NOTE — PROGRESS NOTES
Assessment/Plan:    Problem List Items Addressed This Visit        Digestive    Gastroesophageal reflux disease without esophagitis     · Continue with protonix            Respiratory    COPD (chronic obstructive pulmonary disease) ([facility] [address] ) - Primary (Chronic)     · Duo neb treatment in office  · Stop Brovana  · Continue prednisone, pulmicort, singulair, theophylline, trelegy, duoneb, symbicort, and prn ventolin  · Continue daily doxycycline         Relevant Medications    predniSONE 5 mg tablet    ipratropium-albuterol (DUO-NEB) 0 5-2 5 mg/3 mL inhalation solution 3 mL    Chronic respiratory failure with hypoxia (HCC)     · With prn oxygen therapy         Relevant Medications    predniSONE 5 mg tablet    ipratropium-albuterol (DUO-NEB) 0 5-2 5 mg/3 mL inhalation solution 3 mL       Cardiovascular and Mediastinum    Paroxysmal atrial fibrillation (HCC)     · Continue with eliquis and metoprolol         Chronic diastolic CHF (congestive heart failure) ([facility])     Wt Readings from Last 3 Encounters:   03/02/21 64 3 kg (141 lb 12 8 oz)   02/17/21 65 3 kg (144 lb)   02/16/21 65 5 kg (144 lb 6 4 oz)     · Continue with lasix and potassium supplements                Nervous and Auditory    Lumbar radiculopathy     · Improving  · Continue with percocet as needed  · Continue neurontin but will decrease the dose to twice daily from three times a day            Musculoskeletal and Integument    Compression fracture of L4 vertebra (HCC)       Other    Chronic anxiety (Chronic)     · Continue with atarax         Chronic pain syndrome    Absolute anemia     · Continue with daily fruit - orange intake  · Will decrease ferrex to daily           Other Visit Diagnoses     Iron deficiency anemia             Diagnoses and all orders for this visit:    Mixed simple and mucopurulent chronic bronchitis (HCC)    Chronic respiratory failure with hypoxia (HCC)  -     predniSONE 5 mg tablet;  Take 1 tablet (5 mg total) by mouth 2 (two) times a day with meals Twice daily  -     ipratropium-albuterol (DUO-NEB) 0 5-2 5 mg/3 mL inhalation solution 3 mL    Paroxysmal atrial fibrillation (HCC)    Chronic diastolic CHF (congestive heart failure) ([facility])    Chronic anxiety    Gastroesophageal reflux disease without esophagitis    Lumbar radiculopathy    Other iron deficiency anemia    Iron deficiency anemia    Chronic pain syndrome    Compression fracture of L4 vertebra with routine healing, subsequent encounter     Gastroesophageal reflux disease without esophagitis  · Continue with protonix    COPD (chronic obstructive pulmonary disease) ([facility] [address] )  · Duo neb treatment in office  · Stop Brovana  · Continue prednisone, pulmicort, singulair, theophylline, trelegy, duoneb, symbicort, and prn ventolin  · Continue daily doxycycline    Chronic respiratory failure with hypoxia ([facility])  · With prn oxygen therapy    Paroxysmal atrial fibrillation (HCC)  · Continue with eliquis and metoprolol    Chronic diastolic CHF (congestive heart failure) ([facility])  Wt Readings from Last 3 Encounters:   03/02/21 64 3 kg (141 lb 12 8 oz)   02/17/21 65 3 kg (144 lb)   02/16/21 65 5 kg (144 lb 6 4 oz)     · Continue with lasix and potassium supplements        Lumbar radiculopathy  · Improving  · Continue with percocet as needed  · Continue neurontin but will decrease the dose to twice daily from three times a day    Chronic anxiety  · Continue with atarax    Absolute anemia  · Continue with daily fruit - orange intake  · Will decrease ferrex to daily      Subjective:      Patient ID: Lakeshia Rasmussen is a 82 y o  female  COPD  Patient complains of chronic dyspnea, non-productive cough and wheezing  Symptoms began several years ago  Patient uses 4 pillows at night  Patient currently is on oxygen at 2 L/min per nasal cannula  Sruthi Zapata Respiratory history: frequent episodes of bronchitis, chronic bronchitis, COPD and emphysema          The following portions of the patient's history were reviewed and updated as appropriate:   Past Medical History:  She has a past medical history of Allergies, Asthma, Chronic diastolic CHF (congestive heart failure) ([facility] [address] ), Colitis, Colon polyp, COPD (chronic obstructive pulmonary disease), Diverticulosis, DJD (degenerative joint disease), Gait abnormality, Hypertension, and Paroxysmal atrial fibrillation ([facility] [address] )  ,  _______________________________________________________________________  Medical Problems:  does not have any pertinent problems on file ,  _______________________________________________________________________  Past Surgical History:   has a past surgical history that includes Colon surgery; Colonoscopy w/ polypectomy (N/A, 1/21/2019); Bladder surgery; Small intestine surgery (03/2020); Gastrojejunostomy w/ jejunostomy tube (N/A, 2/3/2020); Colonoscopy; pr injection,sacroiliac joint (Right, 9/2/2020); FL guided needle plac bx/asp/inj (9/2/2020); pr injection,sacroiliac joint (Right, 1/21/2021); and FL guided needle plac bx/asp/inj (1/21/2021)  ,  _______________________________________________________________________  Family History:  family history includes Heart disease in her mother ,  _______________________________________________________________________  Social History:   reports that she quit smoking about 7 years ago  She has never used smokeless tobacco  She reports that she does not drink alcohol or use drugs  ,  _______________________________________________________________________  Allergies:  is allergic to bee venom and fluticasone     _______________________________________________________________________  Current Outpatient Medications   Medication Sig Dispense Refill    alendronate (FOSAMAX) 70 mg tablet take 1 tablet by mouth every 7 days 4 tablet 5    apixaban (ELIQUIS) 5 mg Take 1 tablet (5 mg total) by mouth 2 (two) times a day 60 tablet 5    Artificial Tear Solution (GENTEAL TEARS) 0 1-0 2-0 3 % SOLN Administer 1 drop to both eyes 3 (three) times a day      Calcium Carb-Cholecalciferol (CALCIUM 1000 + D PO) Take 1,000 mg by mouth daily      doxycycline hyclate (VIBRAMYCIN) 100 mg capsule Take 1 capsule (100 mg total) by mouth daily 30 capsule 0    Ferrex 150 150 MG capsule take 1 capsule by mouth twice a day 60 capsule 5    fluticasone-umeclidinium-vilanterol (Trelegy Ellipta) 100-62 5-25 MCG/INH inhaler Inhale 1 puff daily Rinse mouth after use   3 Inhaler 1    furosemide (LASIX) 20 mg tablet take 2 tablets by mouth twice a day 60 tablet 5    gabapentin (NEURONTIN) 300 mg capsule take 1 capsule by mouth three times a day (Patient taking differently: 300 mg 3 (three) times a day ) 90 capsule 2    hydrOXYzine HCL (ATARAX) 25 mg tablet Take 1 tablet (25 mg total) by mouth every 6 (six) hours as needed for anxiety 90 tablet 0    ipratropium-albuterol (DUO-NEB) 0 5-2 5 mg/3 mL nebulizer solution Take 1 vial (3 mL total) by nebulization every 6 (six) hours while awake 300 mL 0    metoprolol tartrate (LOPRESSOR) 25 mg tablet Take 0 5 tablets (12 5 mg total) by mouth every 12 (twelve) hours 90 tablet 0    montelukast (SINGULAIR) 10 mg tablet take 1 tablet by mouth daily at bedtime 90 tablet 1    oxyCODONE-acetaminophen (PERCOCET) 5-325 mg per tablet Take 1 tablet by mouth every 6 (six) hours as needed for moderate pain Do not fill until 3/18/2021Max Daily Amount: 4 tablets 120 tablet 0    OXYGEN-HELIUM IN Inhale      pantoprazole (PROTONIX) 40 mg tablet Take 1 tablet (40 mg total) by mouth 2 (two) times a day 60 tablet 5    predniSONE 5 mg tablet Take 1 tablet (5 mg total) by mouth 2 (two) times a day with meals Twice daily 60 tablet 5    psyllium (METAMUCIL) packet Take 1 packet by mouth 3 (three) times a day 100 packet 1    simethicone (MYLICON) 80 mg chewable tablet Chew 1 tablet (80 mg total) every 6 (six) hours as needed for flatulence 30 tablet 0    theophylline (CORTNEY-24) 200 MG 24 hr capsule Take 1 capsule (200 mg total) by mouth daily 30 capsule 0    arformoterol (Brovana) 15 mcg/2 mL nebulizer solution Inhale 15 mcg 2 (two) times a day      budesonide (PULMICORT) 0 5 mg/2 mL nebulizer solution Inhale 0 5 mg 2 (two) times a day      budesonide-formoterol (Symbicort) 160-4 5 mcg/act inhaler Inhale 2 puffs 2 (two) times a day      polyethylene glycol (MIRALAX) 17 g packet Take 17 g by mouth daily 510 g 0    potassium chloride (K-DUR,KLOR-CON) 20 mEq tablet Take 2 tablets (40 mEq total) by mouth daily 60 tablet 0     Current Facility-Administered Medications   Medication Dose Route Frequency Provider Last Rate Last Admin    ipratropium-albuterol (DUO-NEB) 0 5-2 5 mg/3 mL inhalation solution 3 mL  3 mL Nebulization Q6H LEEROY Vo   3 mL at 03/02/21 0944     _______________________________________________________________________  Review of Systems   Constitutional: Negative for activity change, chills, fatigue and fever  HENT: Negative for rhinorrhea and sore throat  Eyes: Negative for pain  Respiratory: Positive for cough and shortness of breath  Cardiovascular: Negative for chest pain, palpitations and leg swelling  Gastrointestinal: Negative for abdominal pain, constipation, diarrhea, nausea and vomiting  Genitourinary: Negative for difficulty urinating, flank pain, frequency and urgency  Musculoskeletal: Negative for gait problem, joint swelling and myalgias  Skin: Negative for color change  Neurological: Negative for dizziness, weakness, light-headedness and headaches  Psychiatric/Behavioral: Negative for sleep disturbance  The patient is not nervous/anxious  All other systems reviewed and are negative  Objective:  Vitals:    03/02/21 0847   BP: 140/76   BP Location: Right arm   Patient Position: Sitting   Cuff Size: Standard   Pulse: 100   Resp: 16   Temp: 97 6 °F (36 4 °C)   SpO2: 93%   Weight: 64 3 kg (141 lb 12 8 oz)   Height: 5' (1 524 m)     Body mass index is 27 69 kg/m²       Physical Exam  Vitals signs reviewed  Constitutional:       General: She is awake  Appearance: Normal appearance  She is well-developed  HENT:      Head: Normocephalic and atraumatic  Nose: Nose normal       Mouth/Throat:      Mouth: Mucous membranes are moist    Eyes:      Extraocular Movements: Extraocular movements intact  Neck:      Musculoskeletal: Normal range of motion  Cardiovascular:      Rate and Rhythm: Normal rate and regular rhythm  Pulses: Normal pulses  Heart sounds: Normal heart sounds  Pulmonary:      Effort: Accessory muscle usage present  Breath sounds: Examination of the right-lower field reveals decreased breath sounds  Examination of the left-lower field reveals decreased breath sounds  Decreased breath sounds present  Abdominal:      General: Bowel sounds are normal       Palpations: Abdomen is soft  Musculoskeletal: Normal range of motion  Right lower leg: No edema  Left lower leg: No edema  Skin:     General: Skin is warm and dry  Neurological:      Mental Status: She is alert and oriented to person, place, and time  Psychiatric:         Attention and Perception: Attention normal          Mood and Affect: Mood normal          Speech: Speech normal          Behavior: Behavior normal  Behavior is cooperative             PHQ-9 Depression Screening    PHQ-9:   Frequency of the following problems over the past two weeks:      Little interest or pleasure in doing things: 0 - not at all  Feeling down, depressed, or hopeless: 0 - not at all  PHQ-2 Score: 0

## 2021-03-02 NOTE — PATIENT INSTRUCTIONS
COPD, Ambulatory Care   GENERAL INFORMATION:   COPD (chronic obstructive pulmonary disease)  is a lung disease that makes it hard for you to breathe  COPD is usually a result of lung damage caused by years of irritation and inflammation  COPD limits air flow in your lungs  Smoking, pollution, genetics, or a history of lung infections can increase your risk for COPD  Common symptoms include the following:   · Shortness of breath     · A dry cough     · Coughing fits that bring up mucus from your lungs     · Wheezing and chest tightness  Seek immediate care for the following symptoms:   · Confusion, dizziness, or lightheadedness    · Red, swollen, warm arm or leg    · Shortness of breath or chest pain    · Coughing up blood  Treatment for COPD  may include medicines to help decrease swelling and inflammation in your lungs  Medicines may also help open your airways or treat and infection  You may need pulmonary rehabilitation to help you manage your symptoms and improve your quality of life  You may need extra oxygen to help you breathe easier  Manage COPD and prevent an exacerbation:   · Do not smoke, and avoid others who smoke  If you smoke, it is never too late to quit  You may have fewer exacerbations  Ask for information about medicines and support programs that can help you quit  · Avoid triggers that make your symptoms worse  Cold weather and sudden temperature changes can trigger an exacerbation  Fumes from cars and chemicals, air pollution, and perfume can also increase your symptoms  · Use pursed-lip breathing when you feel short of breath  Take a deep breath in through your nose  Slowly breathe out through your mouth with your lips pursed for twice as long as you inhaled  You can also practice this breathing pattern while you bend, lift, climb stairs, or exercise  Pursed-lip breathing slows down your breathing and helps move more air in and out of your lungs             · Exercise for at least 20 minutes each day  Exercise can help increase your energy and decrease shortness of breath  Ask about the best exercise plan for you  · Prevent infections that can be dangerous when you have COPD  Get a flu vaccine every year as soon as it becomes available  Ask if you should also get other vaccines, such as those given to prevent pneumonia and tetanus  Avoid people who are sick, and wash your hands often  Follow up with your healthcare provider as directed:  Write down your questions so you remember to ask them during your visits  CARE AGREEMENT:   You have the right to help plan your care  Learn about your health condition and how it may be treated  Discuss treatment options with your caregivers to decide what care you want to receive  You always have the right to refuse treatment  The above information is an  only  It is not intended as medical advice for individual conditions or treatments  Talk to your doctor, nurse or pharmacist before following any medical regimen to see if it is safe and effective for you  © 2014 [address] is for End User's use only and may not be sold, redistributed or otherwise used for commercial purposes  All illustrations and images included in CareNotes® are the copyrighted property of A D A M , Inc  or [employer]ony Lopez

## 2021-03-02 NOTE — ASSESSMENT & PLAN NOTE
· Duo neb treatment in office  · Stop Brovana  · Continue prednisone, pulmicort, singulair, theophylline, trelegy, duoneb, symbicort, and prn ventolin    · Continue daily doxycycline

## 2021-03-02 NOTE — ASSESSMENT & PLAN NOTE
· Improving  · Continue with percocet as needed  · Continue neurontin but will decrease the dose to twice daily from three times a day

## 2021-04-03 PROBLEM — R73.9 HYPERGLYCEMIA: Status: ACTIVE | Noted: 2021-01-01

## 2021-04-03 PROBLEM — R26.2 AMBULATORY DYSFUNCTION: Status: ACTIVE | Noted: 2021-01-01

## 2021-04-03 NOTE — ED PROVIDER NOTES
History  Chief Complaint   Patient presents with    Back Pain     lower back pain that radiates up     29-year-old female with multiple chronic medical issues presents emergency department concern for worsening back pain and difficulty ambulating  She has a history significant for multiple lumbar compression fractures as well as COPD requiring chronic oxygen and CHF  She appears chronically ill at her baseline  She states that she believes she may have re-injured her back while getting off of a bus the other day  She denies any falls or traumatic injury  She denies urinary or bowel incontinence saddle anesthesia numbness in the legs  Allergies reviewed          Prior to Admission Medications   Prescriptions Last Dose Informant Patient Reported? Taking? Artificial Tear Solution (GENTEAL TEARS) 0 1-0 2-0 3 % SOLN 4/2/2021 at Unknown time Self Yes Yes   Sig: Administer 1 drop to both eyes 3 (three) times a day   Calcium Carb-Cholecalciferol (CALCIUM 1000 + D PO) 4/2/2021 at Unknown time Self Yes Yes   Sig: Take 1,000 mg by mouth daily   OXYGEN-HELIUM IN 4/2/2021 at Unknown time Self Yes Yes   Sig: Inhale   alendronate (FOSAMAX) 70 mg tablet 4/2/2021 at Unknown time  No Yes   Sig: take 1 tablet by mouth every 7 days   apixaban (ELIQUIS) 5 mg 4/2/2021 at 1800  No Yes   Sig: Take 1 tablet (5 mg total) by mouth 2 (two) times a day   budesonide (PULMICORT) 0 5 mg/2 mL nebulizer solution Not Taking Self Yes No   Sig: Inhale 0 5 mg 2 (two) times a day   budesonide-formoterol (Symbicort) 160-4 5 mcg/act inhaler 4/2/2021 at Unknown time Self Yes Yes   Sig: Inhale 2 puffs 2 (two) times a day   fluticasone-umeclidinium-vilanterol (Trelegy Ellipta) 100-62 5-25 MCG/INH inhaler 4/2/2021 at Unknown time Self No Yes   Sig: Inhale 1 puff daily Rinse mouth after use     furosemide (LASIX) 20 mg tablet 4/2/2021 at 1800  No Yes   Sig: take 2 tablets by mouth twice a day   gabapentin (NEURONTIN) 300 mg capsule 4/2/2021 at 1800 No Yes   Sig: Take 1 capsule (300 mg total) by mouth 2 (two) times a day   hydrOXYzine HCL (ATARAX) 25 mg tablet 4/2/2021 at Unknown time  No Yes   Sig: Take 1 tablet (25 mg total) by mouth every 6 (six) hours as needed for anxiety   ipratropium-albuterol (DUO-NEB) 0 5-2 5 mg/3 mL nebulizer solution 4/2/2021 at Unknown time Self No Yes   Sig: Take 1 vial (3 mL total) by nebulization every 6 (six) hours while awake   iron polysaccharides (Ferrex 150) 150 mg capsule 4/2/2021 at Unknown time  No Yes   Sig: Take 1 capsule (150 mg total) by mouth daily   metoprolol tartrate (LOPRESSOR) 25 mg tablet 4/2/2021 at 1800  No Yes   Sig: take 1/2 tablet by mouth every 12 hours   montelukast (SINGULAIR) 10 mg tablet 4/2/2021 at 1800  No Yes   Sig: Take 1 tablet (10 mg total) by mouth daily at bedtime   oxyCODONE-acetaminophen (PERCOCET) 5-325 mg per tablet 4/2/2021 at Unknown time  No Yes   Sig: Take 1 tablet by mouth every 6 (six) hours as needed for moderate pain Do not fill until 3/18/2021Max Daily Amount: 4 tablets   pantoprazole (PROTONIX) 40 mg tablet 4/2/2021 at 1800  No Yes   Sig: take 1 tablet by mouth twice a day   polyethylene glycol (MIRALAX) 17 g packet   No No   Sig: Take 17 g by mouth daily   potassium chloride (K-DUR,KLOR-CON) 20 mEq tablet   No No   Sig: Take 2 tablets (40 mEq total) by mouth daily   predniSONE 5 mg tablet 4/2/2021 at Unknown time  No Yes   Sig: Take 1 tablet (5 mg total) by mouth 2 (two) times a day with meals Twice daily   psyllium (METAMUCIL) packet 4/2/2021 at Unknown time Self No Yes   Sig: Take 1 packet by mouth 3 (three) times a day   simethicone (MYLICON) 80 mg chewable tablet Past Week at Unknown time Self No Yes   Sig: Chew 1 tablet (80 mg total) every 6 (six) hours as needed for flatulence   theophylline (CORTNEY-24) 200 MG 24 hr capsule 4/2/2021 at Unknown time Self No Yes   Sig: Take 1 capsule (200 mg total) by mouth daily      Facility-Administered Medications: None       Past Medical History:   Diagnosis Date    Allergies     Asthma     Chronic diastolic CHF (congestive heart failure) (HCC)     Colitis     Colon polyp     COPD (chronic obstructive pulmonary disease)     Diverticulosis     DJD (degenerative joint disease)     Gait abnormality     Hypertension     Paroxysmal atrial fibrillation (HCC)        Past Surgical History:   Procedure Laterality Date    BLADDER SURGERY      COLON SURGERY      COLONOSCOPY      COLONOSCOPY W/ POLYPECTOMY N/A 2019    Procedure: COLONOSCOPY W/ POLYPECTOMY;  Surgeon: Trip Ballard MD;  Location: [facility]; Service: General    FL GUIDED NEEDLE PLAC BX/ASP/INJ  2020    FL GUIDED NEEDLE PLAC BX/ASP/INJ  2021    GASTROJEJUNOSTOMY W/ JEJUNOSTOMY TUBE N/A 2/3/2020    Procedure: Antrectomy with bilroth II Anastomosis; Surgeon: Sudhir Hamilton MD;  Location:  MAIN OR;  Service: General   Aminata Knock JOINT Right 2020    Procedure: BLOCK / INJECTION SACROILIAC;  Surgeon: Mo Stover MD;  Location: [facility];  Service: Pain Management     MI INJECTION,SACROILIAC JOINT Right 2021    Procedure: RIGHT SACROILIAC JOINT INJECTION;  Surgeon: Mo Stover MD;  Location: [facility];  Service: Pain Management     SMALL INTESTINE SURGERY  2020    with partial gastrectomy       Family History   Problem Relation Age of Onset    Heart disease Mother      I have reviewed and agree with the history as documented      E-Cigarette/Vaping    E-Cigarette Use Never User      E-Cigarette/Vaping Substances    Nicotine No     THC No     CBD No     Flavoring No     Other No     Unknown No      Social History     Tobacco Use    Smoking status: Former Smoker     Quit date: 2013     Years since quittin 3    Smokeless tobacco: Never Used   Substance Use Topics    Alcohol use: Never     Alcohol/week: 0 0 standard drinks     Frequency: Never     Drinks per session: Patient refused     Binge frequency: Never    Drug use: Never       Review of Systems   Constitutional: Negative for chills, fatigue and fever  HENT: Negative for congestion, ear pain, rhinorrhea, sinus pressure, sneezing, sore throat and trouble swallowing  Eyes: Negative for discharge and itching  Respiratory: Negative for cough, chest tightness, shortness of breath, wheezing and stridor  Cardiovascular: Negative for chest pain and palpitations  Gastrointestinal: Negative for abdominal pain, diarrhea, nausea and vomiting  Musculoskeletal: Positive for back pain  Neurological: Negative for dizziness, syncope, weakness, numbness and headaches  All other systems reviewed and are negative  Physical Exam  Physical Exam  Vitals signs and nursing note reviewed  Constitutional:       General: She is not in acute distress  Appearance: She is well-developed and overweight  She is ill-appearing (Chronic)  She is not diaphoretic  Interventions: Nasal cannula in place  HENT:      Head: Normocephalic and atraumatic  Right Ear: External ear normal       Left Ear: External ear normal       Nose: Nose normal    Eyes:      Conjunctiva/sclera: Conjunctivae normal       Pupils: Pupils are equal, round, and reactive to light  Neck:      Musculoskeletal: Normal range of motion  Cardiovascular:      Rate and Rhythm: Normal rate and regular rhythm  Heart sounds: Normal heart sounds  No murmur  No friction rub  No gallop  Pulmonary:      Effort: Pulmonary effort is normal  No respiratory distress  Breath sounds: Normal breath sounds  No stridor  No wheezing or rales  Abdominal:      General: Bowel sounds are normal  There is no distension  Palpations: Abdomen is soft  Tenderness: There is no abdominal tenderness  There is no guarding  Musculoskeletal: Normal range of motion  Lumbar back: She exhibits tenderness  Comments: Generalized lumbar tenderness and lumbar pain     Skin: General: Skin is warm  Capillary Refill: Capillary refill takes less than 2 seconds  Neurological:      Mental Status: She is alert and oriented to person, place, and time  Psychiatric:         Behavior: Behavior is cooperative           Vital Signs  ED Triage Vitals [04/03/21 0907]   Temperature Pulse Respirations Blood Pressure SpO2   (!) 97 2 °F (36 2 °C) 66 (!) 26 110/65 98 %      Temp Source Heart Rate Source Patient Position - Orthostatic VS BP Location FiO2 (%)   Temporal Monitor Lying Left arm --      Pain Score       Worst Possible Pain           Vitals:    04/03/21 2300 04/03/21 2348 04/04/21 0343 04/04/21 0715   BP: 118/59 111/63 112/73 110/69   Pulse: 74 85 83 89   Patient Position - Orthostatic VS: Lying Lying           Visual Acuity      ED Medications  Medications   acetaminophen (TYLENOL) tablet 650 mg (has no administration in time range)   apixaban (ELIQUIS) tablet 5 mg (5 mg Oral Given 4/3/21 1701)   artificial tear (LUBRIFRESH P M ) ophthalmic ointment (has no administration in time range)   budesonide-formoterol (SYMBICORT) 160-4 5 mcg/act inhaler 2 puff (2 puffs Inhalation Given 4/3/21 1810)   furosemide (LASIX) tablet 40 mg (40 mg Oral Given 4/3/21 1701)   metoprolol tartrate (LOPRESSOR) partial tablet 12 5 mg (12 5 mg Oral Given 4/4/21 0343)   montelukast (SINGULAIR) tablet 10 mg (10 mg Oral Given 4/3/21 2102)   oxyCODONE-acetaminophen (PERCOCET) 5-325 mg per tablet 1 tablet (1 tablet Oral Given 4/4/21 0706)   pantoprazole (PROTONIX) EC tablet 40 mg (40 mg Oral Given 4/3/21 1701)   predniSONE tablet 5 mg (5 mg Oral Given 4/4/21 0707)   theophylline (CORTNEY-24) 24 hr capsule 200 mg (200 mg Oral Given 4/3/21 1700)   ipratropium (ATROVENT) 0 02 % inhalation solution 0 5 mg (0 5 mg Nebulization Given 4/4/21 0707)   levalbuterol (XOPENEX) inhalation solution 1 25 mg (1 25 mg Nebulization Given 4/4/21 0707)   insulin lispro (HumaLOG) 100 units/mL subcutaneous injection 1-6 Units (3 Units Subcutaneous Given 4/4/21 0707)   insulin lispro (HumaLOG) 100 units/mL subcutaneous injection 1-5 Units (4 Units Subcutaneous Given 4/3/21 2102)   albuterol (PROVENTIL HFA,VENTOLIN HFA) inhaler 2 puff (has no administration in time range)   lidocaine (LIDODERM) 5 % patch 1 patch (1 patch Topical Patch Removed 4/4/21 0353)   gabapentin (NEURONTIN) capsule 300 mg (300 mg Oral Given 4/3/21 2102)   HYDROmorphone HCl (DILAUDID) injection 0 2 mg (0 2 mg Intravenous Given 4/3/21 1812)   menthol-methyl salicylate (BENGAY) 18-27 % cream ( Apply externally Given 4/3/21 2102)   insulin glargine (LANTUS) subcutaneous injection 25 Units 0 25 mL (has no administration in time range)   oxyCODONE (ROXICODONE) IR tablet 5 mg (5 mg Oral Given 4/3/21 1119)   morphine injection 2 mg (2 mg Intravenous Given 4/3/21 1254)   potassium chloride (K-DUR,KLOR-CON) CR tablet 40 mEq (40 mEq Oral Given 4/3/21 1621)   HYDROmorphone HCl (DILAUDID) injection 0 2 mg (0 2 mg Intravenous Given 4/3/21 2102)       Diagnostic Studies  Results Reviewed     Procedure Component Value Units Date/Time    Basic metabolic panel [[id number]]  (Abnormal) Collected: 04/04/21 0429    Lab Status: Final result Specimen: Blood from Arm, Right Updated: 04/04/21 0619     Sodium 138 mmol/L      Potassium 3 6 mmol/L      Chloride 95 mmol/L      CO2 31 mmol/L      ANION GAP 12 mmol/L      BUN 10 mg/dL      Creatinine 0 65 mg/dL      Glucose 281 mg/dL      Calcium 7 7 mg/dL      eGFR 82 ml/min/1 73sq m     Narrative:      [place] guidelines for Chronic Kidney Disease (CKD):     Stage 1 with normal or high GFR (GFR > 90 mL/min/1 73 square meters)    Stage 2 Mild CKD (GFR = 60-89 mL/min/1 73 square meters)    Stage 3A Moderate CKD (GFR = 45-59 mL/min/1 73 square meters)    Stage 3B Moderate CKD (GFR = 30-44 mL/min/1 73 square meters)    Stage 4 Severe CKD (GFR = 15-29 mL/min/1 73 square meters)    Stage 5 End Stage CKD (GFR <15 mL/min/1 73 square meters)  Note: GFR calculation is accurate only with a steady state creatinine    CBC and differential [[id number]]  (Abnormal) Collected: 04/04/21 0430    Lab Status: Final result Specimen: Blood from Arm, Right Updated: 04/04/21 0608     WBC 7 30 Thousand/uL      RBC 4 79 Million/uL      Hemoglobin 11 1 g/dL      Hematocrit 35 7 %      MCV 75 fL      MCH 23 2 pg      MCHC 31 2 g/dL      RDW 20 2 %      MPV 8 2 fL      Platelets 162 Thousands/uL     Narrative:      See Manual Diff Done Within 3 Days        Hemoglobin A1C [[id number]]  (Abnormal) Collected: 04/03/21 1852    Lab Status: Final result Specimen: Blood from Arm, Right Updated: 04/04/21 0248     Hemoglobin A1C 12 4 %       mg/dl     B-Type Natriuretic Peptide [facility] ONLY) [[id number]]  (Abnormal) Collected: 04/03/21 1250    Lab Status: Final result Specimen: Blood Updated: 04/03/21 1520      pg/mL     Smear Review(Phlebs Do Not Order) [[id number]] Collected: 04/03/21 1250    Lab Status: Final result Specimen: Blood from Arm, Left Updated: 04/03/21 1341     RBC Morphology abnormal     Anisocytosis Present     Hypochromia Present     Microcytes Present     Polychromasia Present     Stomatocytes Present     Platelet Estimate Adequate     Large Platelet Present    Manual Differential (Non Wam) [[id number]]  (Abnormal) Collected: 04/03/21 1250    Lab Status: Final result Specimen: Blood from Arm, Left Updated: 04/03/21 1340     Segmented % 84 %      Lymphocytes % 9 %      Monocytes % 5 %      Myelocytes % 2 %      Neutrophils Absolute 7 64 Thousand/uL      Lymphocytes Absolute 0 82 Thousand/uL      Monocytes Absolute 0 46 Thousand/uL      Total Counted 100     RBC Morphology --    COVID19, Influenza A/B, RSV PCR, [facility] [[id number]]  (Normal) Collected: 04/03/21 1250    Lab Status: Final result Specimen: Nares from Nasopharyngeal Swab Updated: 04/03/21 1335     SARS-CoV-2 Negative     INFLUENZA A PCR Negative     INFLUENZA B PCR Negative     RSV PCR Negative    Narrative: This test has been authorized by FDA under an EUA (Emergency Use Assay) for use by authorized laboratories  Clinical caution and judgement should be used with the interpretation of these results with consideration of the clinical impression and other laboratory testing  Testing reported as "Positive" or "Negative" has been proven to be accurate according to standard laboratory validation requirements  All testing is performed with control materials showing appropriate reactivity at standard intervals      Comprehensive metabolic panel [[id number]]  (Abnormal) Collected: 04/03/21 1250    Lab Status: Final result Specimen: Blood from Arm, Left Updated: 04/03/21 1320     Sodium 134 mmol/L      Potassium 3 3 mmol/L      Chloride 93 mmol/L      CO2 30 mmol/L      ANION GAP 11 mmol/L      BUN 13 mg/dL      Creatinine 0 63 mg/dL      Glucose 326 mg/dL      Calcium 7 9 mg/dL      Corrected Calcium 8 4 mg/dL      AST 11 U/L      ALT 24 U/L      Alkaline Phosphatase 44 U/L      Total Protein 5 6 g/dL      Albumin 3 4 g/dL      Total Bilirubin 0 50 mg/dL      eGFR 83 ml/min/1 73sq m     Narrative:      [place] guidelines for Chronic Kidney Disease (CKD):     Stage 1 with normal or high GFR (GFR > 90 mL/min/1 73 square meters)    Stage 2 Mild CKD (GFR = 60-89 mL/min/1 73 square meters)    Stage 3A Moderate CKD (GFR = 45-59 mL/min/1 73 square meters)    Stage 3B Moderate CKD (GFR = 30-44 mL/min/1 73 square meters)    Stage 4 Severe CKD (GFR = 15-29 mL/min/1 73 square meters)    Stage 5 End Stage CKD (GFR <15 mL/min/1 73 square meters)  Note: GFR calculation is accurate only with a steady state creatinine    CBC and differential [[id number]]  (Abnormal) Collected: 04/03/21 1250    Lab Status: Final result Specimen: Blood from Arm, Left Updated: 04/03/21 1318     WBC 9 10 Thousand/uL      RBC 5 02 Million/uL      Hemoglobin 11 5 g/dL      Hematocrit 37 6 %      MCV 75 fL      MCH 22 9 pg      MCHC 30 5 g/dL      RDW 19 9 %      MPV 8 2 fL      Platelets 175 Thousands/uL                  XR spine lumbar 2 or 3 views injury   Final Result by Oneil Manzano MD (04/03 1447)      Multiple lumbar compression fractures appear grossly similar in extent to prior CT 2/24/2020  Workstation performed: [id number]         XR chest 1 view   Final Result by Oneil Manzano MD (04/03 1436)      Cardiomegaly and pulmonary vascular congestion  Workstation performed: [id number]                    Procedures  Procedures         ED Course                                           MDM  Number of Diagnoses or Management Options  Acute low back pain: established and worsening  Pulmonary vascular congestion: established and worsening  Shortness of breath: established and worsening  Diagnosis management comments: Acute on chronic low back pain  Patient complains of chronic shortness of breath  Pulmonary vascular congestion on chest x-ray  Patient demonstrates significant difficulty ambulating in the emergency department  Not safe for discharge home at this time  Will seek admission  Patient is in agreement with this plan         Amount and/or Complexity of Data Reviewed  Clinical lab tests: ordered and reviewed  Tests in the radiology section of CPT®: ordered and reviewed    Risk of Complications, Morbidity, and/or Mortality  Presenting problems: moderate  Diagnostic procedures: low  Management options: low    Patient Progress  Patient progress: stable      Disposition  Final diagnoses:   Acute low back pain   Shortness of breath   Pulmonary vascular congestion     Time reflects when diagnosis was documented in both MDM as applicable and the Disposition within this note     Time User Action Codes Description Comment    4/3/2021  2:55 PM Cally De Anda Add [M54 5] Acute low back pain     4/3/2021  2:56 PM Maddie Currie Add [R06 02] Shortness of breath 4/3/2021  2:56 PM Tiffanie Oviedo Add [R09 89] Pulmonary vascular congestion       ED Disposition     ED Disposition Condition Date/Time Comment    Admit Stable Sat Apr 3, 2021  2:55 PM Case was discussed with Dr Marleen Castano and the patient's admission status was agreed to be Admission Status: observation status to the service of Dr Marleen Castano   Follow-up Information    None         Current Discharge Medication List      CONTINUE these medications which have NOT CHANGED    Details   alendronate (FOSAMAX) 70 mg tablet take 1 tablet by mouth every 7 days  Qty: 4 tablet, Refills: 5    Associated Diagnoses: Osteoporosis, unspecified osteoporosis type, unspecified pathological fracture presence      apixaban (ELIQUIS) 5 mg Take 1 tablet (5 mg total) by mouth 2 (two) times a day  Qty: 60 tablet, Refills: 5    Associated Diagnoses: Paroxysmal atrial fibrillation (HCC)      Artificial Tear Solution (GENTEAL TEARS) 0 1-0 2-0 3 % SOLN Administer 1 drop to both eyes 3 (three) times a day      budesonide-formoterol (Symbicort) 160-4 5 mcg/act inhaler Inhale 2 puffs 2 (two) times a day      Calcium Carb-Cholecalciferol (CALCIUM 1000 + D PO) Take 1,000 mg by mouth daily      fluticasone-umeclidinium-vilanterol (Trelegy Ellipta) 100-62 5-25 MCG/INH inhaler Inhale 1 puff daily Rinse mouth after use  Qty: 3 Inhaler, Refills: 1    Associated Diagnoses: Simple chronic bronchitis ([facility])      furosemide (LASIX) 20 mg tablet take 2 tablets by mouth twice a day  Qty: 60 tablet, Refills: 5    Associated Diagnoses: Chronic respiratory failure with hypoxia ([facility])      gabapentin (NEURONTIN) 300 mg capsule Take 1 capsule (300 mg total) by mouth 2 (two) times a day  Qty:      Associated Diagnoses: Chronic pain syndrome;  Compression fracture of L4 vertebra with routine healing, subsequent encounter      hydrOXYzine HCL (ATARAX) 25 mg tablet Take 1 tablet (25 mg total) by mouth every 6 (six) hours as needed for anxiety  Qty: 90 tablet, Refills: 0 Associated Diagnoses: Anxiety      ipratropium-albuterol (DUO-NEB) 0 5-2 5 mg/3 mL nebulizer solution Take 1 vial (3 mL total) by nebulization every 6 (six) hours while awake  Qty: 300 mL, Refills: 0    Associated Diagnoses: COPD (chronic obstructive pulmonary disease) ([facility])      iron polysaccharides (Ferrex 150) 150 mg capsule Take 1 capsule (150 mg total) by mouth daily  Qty: 60 capsule, Refills: 5    Associated Diagnoses: Iron deficiency anemia      metoprolol tartrate (LOPRESSOR) 25 mg tablet take 1/2 tablet by mouth every 12 hours  Qty: 90 tablet, Refills: 0    Associated Diagnoses: Congestive heart failure ([facility])      montelukast (SINGULAIR) 10 mg tablet Take 1 tablet (10 mg total) by mouth daily at bedtime  Qty: 90 tablet, Refills: 1    Associated Diagnoses: Seasonal allergies      oxyCODONE-acetaminophen (PERCOCET) 5-325 mg per tablet Take 1 tablet by mouth every 6 (six) hours as needed for moderate pain Do not fill until 3/18/2021Max Daily Amount: 4 tablets  Qty: 120 tablet, Refills: 0    Associated Diagnoses: Chronic pain syndrome;  Compression fracture of L4 vertebra with routine healing, subsequent encounter      OXYGEN-HELIUM IN Inhale      pantoprazole (PROTONIX) 40 mg tablet take 1 tablet by mouth twice a day  Qty: 60 tablet, Refills: 5    Associated Diagnoses: Gastroesophageal reflux disease without esophagitis      predniSONE 5 mg tablet Take 1 tablet (5 mg total) by mouth 2 (two) times a day with meals Twice daily  Qty: 60 tablet, Refills: 5    Associated Diagnoses: Chronic respiratory failure with hypoxia ([facility])      psyllium (METAMUCIL) packet Take 1 packet by mouth 3 (three) times a day  Qty: 100 packet, Refills: 1    Associated Diagnoses: Diarrhea      simethicone (MYLICON) 80 mg chewable tablet Chew 1 tablet (80 mg total) every 6 (six) hours as needed for flatulence  Qty: 30 tablet, Refills: 0    Associated Diagnoses: Gastric outlet obstruction      theophylline (CORTNEY-24) 200 MG 24 hr capsule Take 1 capsule (200 mg total) by mouth daily  Qty: 30 capsule, Refills: 0    Associated Diagnoses: Simple chronic bronchitis (HCC)      budesonide (PULMICORT) 0 5 mg/2 mL nebulizer solution Inhale 0 5 mg 2 (two) times a day      polyethylene glycol (MIRALAX) 17 g packet Take 17 g by mouth daily  Qty: 510 g, Refills: 0    Associated Diagnoses: Constipation      potassium chloride (K-DUR,KLOR-CON) 20 mEq tablet Take 2 tablets (40 mEq total) by mouth daily  Qty: 60 tablet, Refills: 0    Associated Diagnoses: [facility]           No discharge procedures on file      PDMP Review       Value Time User    PDMP Reviewed  Yes 2/17/2021  9:02 AM Raúl Pereyra [facility] Provider  Electronically Signed by           Kate Magaña PA-C  04/04/21 2593

## 2021-04-03 NOTE — ASSESSMENT & PLAN NOTE
Chronic  CT scan revealed multiple compression fractures of the L-spine  Would benefit from PT OT as outlined above

## 2021-04-03 NOTE — RESPIRATORY THERAPY NOTE
RT Protocol Note  Juventino Turner 82 y o  female MRN: [id number]  Unit/Bed#: -01 Encounter: [id number]    Assessment    Principal Problem:    Ambulatory dysfunction  Active Problems:    COPD (chronic obstructive pulmonary disease) ([facility])    Gastroesophageal reflux disease without esophagitis    Compression fracture of L4 vertebra (HCC)    Paroxysmal atrial fibrillation (HCC)    Chronic diastolic CHF (congestive heart failure) ([facility])    Chronic respiratory failure with hypoxia ([facility])    Hyperglycemia      Home Pulmonary Medications:  Duo neb Q 6 W/A  Trelegy   Symbicort  Albuterol MDI PRN     Home Devices/Therapy: Home O2(3 liters)    Past Medical History:   Diagnosis Date    Allergies     Asthma     Chronic diastolic CHF (congestive heart failure) ([facility])     Colitis     Colon polyp     COPD (chronic obstructive pulmonary disease)     Diverticulosis     DJD (degenerative joint disease)     Gait abnormality     Hypertension     Paroxysmal atrial fibrillation ([facility])      Social History     Socioeconomic History    Marital status:       Spouse name: None    Number of children: None    Years of education: None    Highest education level: None   Occupational History    Occupation: retired    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   Social Needs    Financial resource strain: None    Food insecurity     Worry: None     Inability: None    Transportation needs     Medical: None     Non-medical: None   Tobacco Use    Smoking status: Former Smoker     Quit date: 2013     Years since quittin 3    Smokeless tobacco: Never Used   Substance and Sexual Activity    Alcohol use: Never     Frequency: Never     Binge frequency: Never    Drug use: Never    Sexual activity: Not Currently   Lifestyle    Physical activity     Days per week: None     Minutes per session: None    Stress: None   Relationships    Social connections     Talks on phone: None     Gets together: None     Attends Restorationism service: None     Active member of club or organization: None     Attends meetings of clubs or organizations: None     Relationship status: None    Intimate partner violence     Fear of current or ex partner: None     Emotionally abused: None     Physically abused: None     Forced sexual activity: None   Other Topics Concern    None   Social History Narrative    Retired        Daily caffeine use- 2 cups of coffee       Subjective         Objective    Physical Exam:   Assessment Type: Assess only  General Appearance: Alert, Awake  Respiratory Pattern: Normal  Chest Assessment: Chest expansion symmetrical  Bilateral Breath Sounds: Clear, Diminished  Cough: None    Vitals:  Blood pressure 116/69, pulse 88, temperature 98 1 °F (36 7 °C), temperature source Oral, resp  rate (!) 30, height 5' (1 524 m), weight 59 3 kg (130 lb 11 7 oz), SpO2 98 %, not currently breastfeeding  Imaging and other studies: I have personally reviewed pertinent reports  Plan    Respiratory Plan: Home Bronchodilator Patient pathway        Resp Comments: Patient order on 1 25 mg Xopenex and 0 5  mg Atrovent TID Dr Benjamin Keep aware  Also ordered  on her albuterol MDI Q 4 PRN  Will continue to monitor the patient

## 2021-04-03 NOTE — ASSESSMENT & PLAN NOTE
Presented with back pain and inability to ambulate at home  Has known chronic compression fractures  CT scan confirmed this showing chronic lumbar compression fractures  Admit for pain control  PT/OT for likely placement  Case management

## 2021-04-03 NOTE — H&P
[facility]  H&P- [address] 1935, 82 y o  female MRN: [id number]  Unit/Bed#: ED 03 Encounter: [id number]  Primary Care Provider: LEEROY Rivera   Date and time admitted to hospital: 4/3/2021  8:37 AM    * Ambulatory dysfunction  Assessment & Plan  Presented with back pain and inability to ambulate at home  Has known chronic compression fractures  CT scan confirmed this showing chronic lumbar compression fractures  Admit for pain control  PT/OT for likely placement  Case management    Hyperglycemia  Assessment & Plan  Blood sugar noted to be greater than 300 on BMP  Does not appear to have a history of diabetes  Will check A1c  Sliding scale insulin for now    Chronic respiratory failure with hypoxia ([facility] [address] )  Assessment & Plan  Secondary to COPD  Continue home 2 L nasal cannula  Currently baseline    Chronic diastolic CHF (congestive heart failure) (HCC)  Assessment & Plan  Wt Readings from Last 3 Encounters:   04/03/21 64 kg (141 lb)   03/02/21 64 3 kg (141 lb 12 8 oz)   02/17/21 65 3 kg (144 lb)     Continue home beta-blocker  Currently appears euvolemic  Strict input/output        Paroxysmal atrial fibrillation (HCC)  Assessment & Plan  Currently rate controlled  Continue home Eliquis    Compression fracture of L4 vertebra ([facility])  Assessment & Plan  Chronic  CT scan revealed multiple compression fractures of the L-spine  Would benefit from PT OT as outlined above    Gastroesophageal reflux disease without esophagitis  Assessment & Plan  Continue home PPI    COPD (chronic obstructive pulmonary disease) ([facility] [address] )  Assessment & Plan  Not in acute exacerbation  Continue home inhalers      VTE Prophylaxis: Apixaban (Eliquis)  / sequential compression device   Code Status: full code  POLST: There is no POLST form on file for this patient (pre-hospital)  Discussion with family: pt    Anticipated Length of Stay:  Patient will be admitted on an Observation basis with an anticipated length of stay of  < 2 midnights  Justification for Hospital Stay:  Ambulatory dysfunction    Total Time for Visit, including Counseling / Coordination of Care: 1 hour  Greater than 50% of this total time spent on direct patient counseling and coordination of care  Chief Complaint:   Back pain    History of Present Illness:    Gianfranco Tam is a 82 y o  female with past medical history significant COPD on 2 L nasal cannula at baseline, AFib on Eliquis, congestive heart failure who initially presented with back pain  Patient reports her back pain which in her lower back is not much different than her baseline however has been persistent and cause her to come to the emergency department she also reports inability to walk  She otherwise has no acute complaints she denies any numbness, tingling, bowel incontinence, urinary incontinence, fevers, chills, abdominal pain, constipation, diarrhea, headaches, dysuria or any other symptoms  Review of Systems:    Review of Systems   Constitutional: Negative for activity change, appetite change, chills, diaphoresis, fever and unexpected weight change  HENT: Negative for congestion, facial swelling and rhinorrhea  Eyes: Negative for photophobia and visual disturbance  Respiratory: Negative for cough, shortness of breath and wheezing  Cardiovascular: Negative for chest pain and palpitations  Gastrointestinal: Negative for abdominal pain, blood in stool, constipation, diarrhea, nausea and vomiting  Genitourinary: Negative for decreased urine volume, difficulty urinating, dysuria, flank pain, frequency, hematuria and urgency  Musculoskeletal: Positive for back pain  Negative for arthralgias, joint swelling and myalgias  Neurological: Negative for dizziness, syncope, facial asymmetry, light-headedness, numbness and headaches  Psychiatric/Behavioral: Negative for confusion and decreased concentration  The patient is not nervous/anxious  Past Medical and Surgical History:     Past Medical History:   Diagnosis Date    Allergies     Asthma     Chronic diastolic CHF (congestive heart failure) (HCC)     Colitis     Colon polyp     COPD (chronic obstructive pulmonary disease)     Diverticulosis     DJD (degenerative joint disease)     Gait abnormality     Hypertension     Paroxysmal atrial fibrillation (HCC)        Past Surgical History:   Procedure Laterality Date    BLADDER SURGERY      COLON SURGERY      COLONOSCOPY      COLONOSCOPY W/ POLYPECTOMY N/A 1/21/2019    Procedure: COLONOSCOPY W/ POLYPECTOMY;  Surgeon: Sharyn Da Silva MD;  Location: [address] [place] [address] GI LAB; Service: General    FL GUIDED NEEDLE PLAC BX/ASP/INJ  9/2/2020    FL GUIDED NEEDLE PLAC BX/ASP/INJ  1/21/2021    GASTROJEJUNOSTOMY W/ JEJUNOSTOMY TUBE N/A 2/3/2020    Procedure: Antrectomy with bilroth II Anastomosis; Surgeon: Sudhir Hamilton MD;  Location: [facility];  Service: [place] JOINT Right 9/2/2020    Procedure: BLOCK / INJECTION SACROILIAC;  Surgeon: Mo Stover MD;  Location: [facility];  Service: Pain Management     NJ INJECTION,SACROILIAC JOINT Right 1/21/2021    Procedure: RIGHT SACROILIAC JOINT INJECTION;  Surgeon: Mo Stovre MD;  Location: [facility];  Service: Pain Management     SMALL INTESTINE SURGERY  03/2020    with partial gastrectomy       Meds/Allergies:    Prior to Admission medications    Medication Sig Start Date End Date Taking?  Authorizing Provider   alendronate (FOSAMAX) 70 mg tablet take 1 tablet by mouth every 7 days 1/13/21   Noble Hahn, DO   apixaban (ELIQUIS) 5 mg Take 1 tablet (5 mg total) by mouth 2 (two) times a day 3/2/21   Noble Hahn, DO   Artificial Tear Solution (GENTEAL TEARS) 0 1-0 2-0 3 % SOLN Administer 1 drop to both eyes 3 (three) times a day 2/14/20   Historical Provider, MD   budesonide (PULMICORT) 0 5 mg/2 mL nebulizer solution Inhale 0 5 mg 2 (two) times a day 12/17/20 12/17/21  Historical Provider, MD   budesonide-formoterol (Symbicort) 160-4 5 mcg/act inhaler Inhale 2 puffs 2 (two) times a day    Historical Provider, MD   Calcium Carb-Cholecalciferol (CALCIUM 1000 + D PO) Take 1,000 mg by mouth daily 3/28/18   Historical Provider, MD   fluticasone-umeclidinium-vilanterol (Trelegy Ellipta) 108-59 9-42 MCG/INH inhaler Inhale 1 puff daily Rinse mouth after use  8/11/20   Eliverto Sin, DO   furosemide (LASIX) 20 mg tablet take 2 tablets by mouth twice a day 3/1/21   Eliverto Sin, DO   gabapentin (NEURONTIN) 300 mg capsule Take 1 capsule (300 mg total) by mouth 2 (two) times a day 3/2/21   LEEROY Dunbar   hydrOXYzine HCL (ATARAX) 25 mg tablet Take 1 tablet (25 mg total) by mouth every 6 (six) hours as needed for anxiety 2/16/21   LEEROY Singh   ipratropium-albuterol (DUO-NEB) 0 5-2 5 mg/3 mL nebulizer solution Take 1 vial (3 mL total) by nebulization every 6 (six) hours while awake 11/12/19   Nathalie Azar PA-C   iron polysaccharides (Ferrex 150) 150 mg capsule Take 1 capsule (150 mg total) by mouth daily 3/2/21   LEEROY Singh   metoprolol tartrate (LOPRESSOR) 25 mg tablet take 1/2 tablet by mouth every 12 hours 3/11/21   Carmelinaverttari Sin, DO   montelukast (SINGULAIR) 10 mg tablet Take 1 tablet (10 mg total) by mouth daily at bedtime 3/2/21   LEEROY Singh   oxyCODONE-acetaminophen (PERCOCET) 5-325 mg per tablet Take 1 tablet by mouth every 6 (six) hours as needed for moderate pain Do not fill until 3/18/2021Max Daily Amount: 4 tablets 2/17/21   LEEROY Carter   OXYGEN-HELIUM IN Inhale    Historical Provider, MD   pantoprazole (PROTONIX) 40 mg tablet take 1 tablet by mouth twice a day 3/11/21   Eliverto Sin, DO   polyethylene glycol (MIRALAX) 17 g packet Take 17 g by mouth daily 1/3/21 2/23/21  Kyra Saul PA-C   potassium chloride (K-DUR,KLOR-CON) 20 mEq tablet Take 2 tablets (40 mEq total) by mouth daily 1/3/21 2/23/21 Armando Tripathi PA-C   predniSONE 5 mg tablet Take 1 tablet (5 mg total) by mouth 2 (two) times a day with meals Twice daily 3/2/21 8/29/21  Sherinecarly Javier A LEEROY Lorenzo   psyllium (METAMUCIL) packet Take 1 packet by mouth 3 (three) times a day 19   Barbara Lora PA-C   simethicone (MYLICON) 80 mg chewable tablet Chew 1 tablet (80 mg total) every 6 (six) hours as needed for flatulence 20   3828 Yandy SinLEEROY   theophylline (CORTNEY-24) 200 MG 24 hr capsule Take 1 capsule (200 mg total) by mouth daily 20   Shantanu Ndiaye DO   iron polysaccharides (FERREX) 150 mg capsule Take 1 capsule (150 mg total) by mouth 2 (two) times a day 20   Shantanu Ndiaye DO     I have reviewed home medications with patient personally  Allergies: Allergies   Allergen Reactions    Bee Venom Shortness Of Breath    Fluticasone      Per pt  error       Social History:     Marital Status:      Patient Pre-hospital Living Situation: home  Patient Pre-hospital Level of Mobility: independent  Patient Pre-hospital Diet Restrictions: none  Substance Use History:   Social History     Substance and Sexual Activity   Alcohol Use Never    Frequency: Never    Binge frequency: Never     Social History     Tobacco Use   Smoking Status Former Smoker    Quit date: 2013    Years since quittin 3   Smokeless Tobacco Never Used     Social History     Substance and Sexual Activity   Drug Use Never       Family History:    Family History   Problem Relation Age of Onset    Heart disease Mother        Physical Exam:     Vitals:   Blood Pressure: 120/85 (21 1230)  Pulse: 63 (21)  Temperature: (!) 97 2 °F (36 2 °C) (21)  Temp Source: Temporal (21)  Respirations: (!) 26 (21)  Height: 5' (152 4 cm) (21)  Weight - Scale: 64 kg (141 lb) (21)  SpO2: 99 % (21)    Physical Exam  Constitutional:       General: She is not in acute distress  Appearance: She is well-developed  She is not diaphoretic  HENT:      Head: Normocephalic and atraumatic  Nose: Nose normal       Mouth/Throat:      Pharynx: No oropharyngeal exudate  Eyes:      General: No scleral icterus  Right eye: No discharge  Left eye: No discharge  Conjunctiva/sclera: Conjunctivae normal    Neck:      Musculoskeletal: Normal range of motion and neck supple  Thyroid: No thyromegaly  Vascular: No JVD  Cardiovascular:      Rate and Rhythm: Normal rate and regular rhythm  Heart sounds: Normal heart sounds  No murmur  No friction rub  No gallop  Pulmonary:      Effort: Pulmonary effort is normal  No respiratory distress  Breath sounds: Normal breath sounds  No wheezing or rales  Chest:      Chest wall: No tenderness  Abdominal:      General: Bowel sounds are normal  There is no distension  Palpations: Abdomen is soft  Tenderness: There is no abdominal tenderness  There is no guarding or rebound  Musculoskeletal: Normal range of motion  General: No tenderness or deformity  Skin:     General: Skin is warm and dry  Findings: No erythema or rash  Neurological:      Mental Status: She is alert  Mental status is at baseline  Cranial Nerves: No cranial nerve deficit  Sensory: No sensory deficit  Motor: No abnormal muscle tone  Coordination: Coordination normal          (  Additional Data:     Lab Results: I have personally reviewed pertinent reports        Results from last 7 days   Lab Units 04/03/21  1250   WBC Thousand/uL 9 10   HEMOGLOBIN g/dL 11 5*   HEMATOCRIT % 37 6*   PLATELETS Thousands/uL 258   LYMPHO PCT % 9*   MONO PCT % 5     Results from last 7 days   Lab Units 04/03/21  1250   SODIUM mmol/L 134   POTASSIUM mmol/L 3 3*   CHLORIDE mmol/L 93*   CO2 mmol/L 30   BUN mg/dL 13   CREATININE mg/dL 0 63   ANION GAP mmol/L 11   CALCIUM mg/dL 7 9*   ALBUMIN g/dL 3 4*   TOTAL BILIRUBIN mg/dL 0 50 ALK PHOS U/L 44*   ALT U/L 24   AST U/L 11*   GLUCOSE RANDOM mg/dL 326*                       Imaging: I have personally reviewed pertinent reports  XR spine lumbar 2 or 3 views injury   Final Result by Sharri Pappas MD (04/03 5527)      Multiple lumbar compression fractures appear grossly similar in extent to prior CT 2/24/2020  Workstation performed: [id number]         XR chest 1 view   Final Result by Sharri Pappas MD (04/03 7062)      Cardiomegaly and pulmonary vascular congestion  Workstation performed: [id number]         CT spine lumbar without contrast    (Results Pending)       EKG, Pathology, and Other Studies Reviewed on Admission:   · EKG: reviewed    Allscripts / Epic Records Reviewed: Yes     ** Please Note: This note has been constructed using a voice recognition system   **

## 2021-04-03 NOTE — ASSESSMENT & PLAN NOTE
Wt Readings from Last 3 Encounters:   04/03/21 64 kg (141 lb)   03/02/21 64 3 kg (141 lb 12 8 oz)   02/17/21 65 3 kg (144 lb)     Continue home beta-blocker  Currently appears euvolemic  Strict input/output

## 2021-04-03 NOTE — ASSESSMENT & PLAN NOTE
Blood sugar noted to be greater than 300 on BMP  Does not appear to have a history of diabetes  Will check A1c  Sliding scale insulin for now

## 2021-04-04 PROBLEM — E11.9 TYPE 2 DIABETES MELLITUS (HCC): Status: ACTIVE | Noted: 2021-01-01

## 2021-04-04 NOTE — PHYSICAL THERAPY NOTE
Physical Therapy Evaluation & Treatment    Patient's Name: Kandy Arizmendi    Admitting Diagnosis  Shortness of breath [R06 02]  Back pain [M54 9]  Acute low back pain [M54 5]  Pulmonary vascular congestion [R09 89]    Problem List  Patient Active Problem List   Diagnosis    COPD (chronic obstructive pulmonary disease) ([facility] )    Cardiac disease    Diverticulosis of intestine with bleeding    Diarrhea    Colorectal polyps    Effusion of bursa of left elbow    Cardiomegaly    Chronic anxiety    Chronic cystitis    Chronic right-sided low back pain without sciatica    Congestive heart failure (HCC)    Deviated nasal septum    Diverticulosis of colon    Gastroesophageal reflux disease without esophagitis    Gout    History of angioedema    History of colonic polyps    Lumbar radiculopathy    Macular degeneration of both eyes    Obesity    Osteoporosis    Other specified forms of chronic ischemic heart disease    Seasonal allergies    Panic attack    Vocal cord dysfunction    Colitis presumed infectious    [facility]rial fibrillation with rapid ventricular response [facility]    Left elbow pain    Acute on chronic respiratory failure with hypoxia ([facility]    Debility    Positive culture findings in sputum    Compression fracture of L4 vertebra (HCC)    Closed wedge compression fracture of T12 vertebra ([facility])    Wound dehiscence, surgical, subsequent encounter    Chronic peptic ulcer of stomach    Chronic pain syndrome    Sacroiliitis (HCC)    Bronchitis, chronic, simple (HCC)    Paroxysmal atrial fibrillation (HCC)    Chronic diastolic CHF (congestive heart failure) (HCC)    SIRS (systemic inflammatory response syndrome) ([facility])    Hypokalemia    Shortness of breath    Gastrostomy complication, unspecified (HCC)    Anxiety    Chronic respiratory failure with hypoxia (HCC)    Absolute anemia    Encounter for long-term opiate analgesic use    Uncomplicated opioid dependence ([facility] )    Type 2 diabetes mellitus ([facility] )    Ambulatory dysfunction       Past Medical History  Past Medical History:   Diagnosis Date    Allergies     Asthma     Chronic diastolic CHF (congestive heart failure) (HCC)     Colitis     Colon polyp     COPD (chronic obstructive pulmonary disease)     Diverticulosis     DJD (degenerative joint disease)     Gait abnormality     Hypertension     Paroxysmal atrial fibrillation (HCC)        Past Surgical History  Past Surgical History:   Procedure Laterality Date    BLADDER SURGERY      COLON SURGERY      COLONOSCOPY      COLONOSCOPY W/ POLYPECTOMY N/A 1/21/2019    Procedure: COLONOSCOPY W/ POLYPECTOMY;  Surgeon: Trip Ballard MD;  Location: [address] [place] [address] GI LAB; Service: General    FL GUIDED NEEDLE PLAC BX/ASP/INJ  9/2/2020    FL GUIDED NEEDLE PLAC BX/ASP/INJ  1/21/2021    GASTROJEJUNOSTOMY W/ JEJUNOSTOMY TUBE N/A 2/3/2020    Procedure: Antrectomy with bilroth II Anastomosis; Surgeon: Sudhir Hamilton MD;  Location:  MAIN OR;  Service: General   Aminata Knock JOINT Right 9/2/2020    Procedure: BLOCK / INJECTION SACROILIAC;  Surgeon: Mo Stover MD;  [facility];  Service: Pain Management     SD INJECTION,SACROILIAC JOINT Right 1/21/2021    Procedure: RIGHT SACROILIAC JOINT INJECTION;  Surgeon: Mo Stover MD;  [facility];  Service: Pain Management     SMALL INTESTINE SURGERY  03/2020    with partial gastrectomy        04/04/21 0809   PT Last Visit   PT Visit Date 04/04/21   Note Type   Note type Evaluation   Pain Assessment   Pain Assessment Tool 0-10   Pain Score Worst Possible Pain   Pain Location/Orientation Orientation: Mid;Orientation: Lower; Location: Back   Pain Radiating Towards "up and down my spine"   Pain Onset/Description Onset: Ongoing;Frequency: Constant/Continuous; Descriptor: Aching;Descriptor: Sore;Descriptor: Prem Patches   Effect of Pain on Daily Activities yes   Patient's Stated Pain Goal No pain   Hospital Pain Intervention(s) Medication (See MAR); Repositioned; Ambulation/increased activity; Emotional support;Relaxation technique   Multiple Pain Sites No   Home Living   Type of [address] One level;Performs ADLs on one level; Able to live on main level with bedroom/bathroom;Stairs to enter with rails  (3 SAVANNAH)   Bathroom Shower/Tub Tub/shower unit   Bathroom Toilet Standard   Bathroom Equipment Grab bars in shower;Grab bars around toilet   [facility]  (has RW available, denies prior usage)   Prior Function   Level of [place] Independent with ADLs and functional mobility; Needs assistance with IADLs   Lives With Alone   Receives Help From Friend(s); Neighbor   ADL Assistance Independent   IADLs Needs assistance  (medication management, meal preparation)   Falls in the last 6 months 0  (denies)   Vocational 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)   Restrictions/Precautions   Wells April Bearing Precautions Per Order No   Other Precautions Fall Risk;Pain;O2;Visual impairment;Hard of hearing  (3 L O2 via NC)   General   Family/Caregiver Present No   Cognition   Overall Cognitive Status WFL   Arousal/Participation Alert   Orientation Level Oriented X4   Memory Decreased recall of precautions   Following Commands Follows one step commands with increased time or repetition   Comments Pt  agreeable to PT assessment  RUE Assessment   RUE Assessment WFL   LUE Assessment   LUE Assessment WFL   RLE Assessment   RLE Assessment X  (3+/5 gross musculature)   LLE Assessment   LLE Assessment X  (3+/5 gross musculature)   Coordination   Movements are Fluid and Coordinated 0   Sharp/Dull   RLE Sharp/Dull   (reports B pedal tingling)   LLE Sharp/Dull   (reports B pedal tingling)   Bed Mobility   Supine to Sit 4  Minimal assistance   Additional items Assist x 1;Bedrails; Increased time required;Verbal cues;LE management   Sit to Supine   (DNT as pt  was sitting on bedside upon conclusion )   Additional Comments JOHNSON exhibited w/positional change, increased time alotted for recovery  Transfers   Sit to Stand 4  Minimal assistance   Additional items Assist x 1;Bedrails; Increased time required;Verbal cues   Stand to Sit 4  Minimal assistance   Additional items Assist x 1;Bedrails; Increased time required;Verbal cues   Stand pivot Unable to assess  (d/t safety concerns w/current medical status)   Ambulation/Elevation   Gait pattern Improper Weight shift;Narrow MIGUEL ÁNGEL; Forward Flexion; Antalgic; Short stride   Gait Assistance 3  Moderate assist   Additional items Assist x 1;Verbal cues; Tactile cues   Assistive Device None; Other (Comment)  (postural support)   Distance 1 foot forward, 1 foot backward  (did not advance 2/2 safety concerns)   Stair Management Assistance Not tested  (unsafe for trialing at this time)   Balance   Static Sitting Fair +   Dynamic Sitting Fair   Static Standing Fair -   Dynamic Standing Poor +   Ambulatory Poor +   Endurance Deficit   Endurance Deficit Yes   Activity Tolerance   Activity Tolerance Patient limited by fatigue;Patient limited by pain;Treatment limited secondary to medical complications (Comment)  (JOHNSON)   Nurse Made Aware yes, Elba WINN   Assessment   Prognosis Fair   Problem List Decreased strength;Decreased endurance; Impaired balance;Decreased mobility; Decreased coordination; Impaired judgement;Decreased safety awareness; Impaired vision; Impaired hearing;Pain   Assessment Pt is 82 y o  female seen for PT evaluation s/p admit to [address] [place] [address] on 4/3/2021 w/ Ambulatory dysfunction  PT consulted to assess pt's functional mobility and d/c needs  Order placed for PT eval and tx, w/ up and OOB as tolerated order  Comorbidities affecting pt's physical performance at time of assessment include: weakness,ambulatory dysfunction, chronic CHF, chronic respiratory failure w/hypoxia,compression fx L4, COPD, GERD, A-fib, DM   PTA, pt was independent w/ all functional mobility w/ o AD usage  Personal factors affecting pt at time of IE include: stairs to enter home, inability to ambulate household distances, inability to navigate community distances, unable to perform dynamic tasks in community, hearing impairments, visual impairments, unable to perform physical activity, limited insight into impairments, inability to perform ADLs  Please find objective findings from PT assessment regarding body systems outlined above with impairments and limitations including weakness, impaired balance, decreased endurance, impaired coordination, gait deviations, pain, decreased activity tolerance, decreased functional mobility tolerance, decreased safety awareness, impaired judgement, fall risk and SOB upon exertion  The following objective measures performed on IE also reveal limitations: AM-PAC 6-Clicks: 28/75  Pt's clinical presentation is currently unstable/unpredictable seen in pt's presentation of JOHNSON, abnormal lab values,pain severity  Pt to benefit from continued PT tx to address deficits as defined above and maximize level of functional independent mobility and consistency  From PT/mobility standpoint, recommendation at time of d/c would be PAR pending progress in order to facilitate return to PLOF  Barriers to Discharge Inaccessible home environment   Goals   Patient Goals to feel better   LTG Expiration Date 04/14/21   Long Term Goal #1 1 )Patient will complete bed mobility with supervision of 1 for decrease need for caregiver assistance, decrease burden of care  2 ) Patient will complete transfers with supervision of 1 to decrease risk of falls, facilitate upright standing posture  3 ) BLE strength to greater than/equal to 4/5 gross musculature to increase ability to safely transfer, control descent to chair  4 ) Patient will exhibit increase static standing balance to Fair+ > 1 minute without LOB and supervision of 1 to improve activity tolerance   5 ) Patient will exhibit increase dynamic ambulatory balance to Fair > 100 feet w/AD prn CGA of 1 to improve ability to mobilize to toilet, chair and decrease risk for additional medical complications  6 ) Patient will exhibit good self monitoring and ability to follow 2 step commands to increase complexity of tasks and resume ADL's without LOB  PT Treatment Day 1  (please see attached tx note)   Plan   Treatment/Interventions ADL retraining;Functional transfer training;LE strengthening/ROM; Therapeutic exercise;Elevations; Endurance training;Patient/family training;Equipment eval/education; Bed mobility;Gait training; Compensatory technique education;Spoke to nursing;OT   PT Frequency Other (Comment)  (3-5x/wk)   Recommendation   PT Discharge Recommendation Post-Acute Rehabilitation Services   Equipment Recommended   (TBD)   PT - OK to Discharge No   Additional Comments Upon conclusion, pt  was resting w/all needs within reach  Additional Comments 2 Pt's raw score on the [facility] Basic Mobility inpatient short form is 15,standardized score is 36 97  Patients at this level are likely to benefit from DC to [facility]  However, please refer to therapist recommendation for safe DC planning  [facility] Basic Mobility Inpatient   Turning in Bed Without Bedrails 3   Lying on Back to Sitting on Edge of Flat Bed 3   Moving Bed to Chair 3   Standing Up From Chair 2   Walk in Room 2   Climb 3-5 Stairs 2   Basic Mobility Inpatient Raw Score 15   Basic Mobility Standardized Score 36 97   Barthel Index   Feeding 5   Bathing 0   Grooming Score 0   Dressing Score 5   Bladder Score 5   Bowels Score 10   Toilet Use Score 5   Transfers (Bed/Chair) Score 10   Mobility (Level Surface) Score 0   Stairs Score 0   Barthel Index Score 40     Additional Skilled Interventions: Therapeutic Exercises x 10 minutes, [id number]    S:Patient was agreeable to PT tx session,A&Ox4      O:BLE AAROM sitting therapeutic exercises: ankle pumps, hip ABD/ADD,hip flexion, LAQ's, hip IR/ER all 15 reps  A:Patient responded well to provided tasks,pleasant  P:Continue with PT interventions,advancing as patient can safely tolerate,3-5x/wk        Satya Leo, PT

## 2021-04-04 NOTE — ASSESSMENT & PLAN NOTE
Wt Readings from Last 3 Encounters:   04/04/21 60 6 kg (133 lb 9 6 oz)   03/02/21 64 3 kg (141 lb 12 8 oz)   02/17/21 65 3 kg (144 lb)     Continue home beta-blocker  Currently appears euvolemic  Strict input/output

## 2021-04-04 NOTE — PHYSICIAN ADVISOR
Current patient class: Observation  The patient is currently on Hospital Day: 2 at [address]      This patient was originally admitted to the hospital under observation status  After admission the patient was reevaluated and determined to require further hospitalization  The patient is now documented to require at least a 2nd midnight in the hospital  As such the patient is now expected to satisfy the 2 midnight benchmark and is therefore eligible for inpatient admission  After review of the relevant documentation, labs, vital signs and test results, the patient is appropriate for INPATIENT ADMISSION  Admission to the hospital as an inpatient is a complex decision making process which requires the practitioner to consider the patients presenting complaint, history and physical examination and all relevant testing  With this in mind, in this case, the patient was deemed appropriate for INPATIENT ADMISSION  After review of the documentation and testing available at the time of the admission I concur with this clinical determination of medical necessity  Rationale is as follows: The patient is an 28-year-old female who presented to the emergency room on 04/03/2021 with a chief complaint of significant back pain  Patient has history of chronic multiple spinal compression fractures  Patient is being treated with oral Percocet with intravenous Dilaudid dosing for breakthrough pain  PT was consulted  Patient complained of worst possible pain  She is deemed unsafe for discharge home and requires continued evaluation and treatment   Of her severe pain   And her significant physical impairments  inpatient level of care is appropriate        The patients vitals on arrival were   ED Triage Vitals [04/03/21 0907]   Temperature Pulse Respirations Blood Pressure SpO2   (!) 97 2 °F (36 2 °C) 66 (!) 26 110/65 98 %      Temp Source Heart Rate Source Patient Position - Orthostatic VS BP Location FiO2 (%)   Temporal Monitor Lying Left arm --      Pain Score       Worst Possible Pain           Past Medical History:   Diagnosis Date    Allergies     Asthma     Chronic diastolic CHF (congestive heart failure) (HCC)     Colitis     Colon polyp     COPD (chronic obstructive pulmonary disease)     Diverticulosis     DJD (degenerative joint disease)     Gait abnormality     Hypertension     Paroxysmal atrial fibrillation ([address] )      Past Surgical History:   Procedure Laterality Date    BLADDER SURGERY      COLON SURGERY      COLONOSCOPY      COLONOSCOPY W/ POLYPECTOMY N/A 1/21/2019    Procedure: COLONOSCOPY W/ POLYPECTOMY;  Surgeon: Joaquin Velazco MD;  Location: [address] [place] [address] GI LAB; Service: General    FL GUIDED NEEDLE PLAC BX/ASP/INJ  9/2/2020    FL GUIDED NEEDLE PLAC BX/ASP/INJ  1/21/2021    GASTROJEJUNOSTOMY W/ JEJUNOSTOMY TUBE N/A 2/3/2020    Procedure: Antrectomy with bilroth II Anastomosis;   Surgeon: Buffy Sosa MD;  [facility];  Service: Deliliah Sole JOINT Right 9/2/2020    Procedure: BLOCK / INJECTION SACROILIAC;  Surgeon: Victoria Espino MD;  [facility];  Service: Pain Management     MI INJECTION,SACROILIAC JOINT Right 1/21/2021    Procedure: RIGHT SACROILIAC JOINT INJECTION;  Surgeon: Victoria Espino MD;  [facility];  Service: Pain Management     SMALL INTESTINE SURGERY  03/2020    with partial gastrectomy       The patient was admitted to the hospital at N/A on N/A for the following diagnosis:  Shortness of breath [R06 02]  Back pain [M54 9]  Acute low back pain [M54 5]  Pulmonary vascular congestion [R09 89]    Consults have been placed to:   IP CONSULT TO CASE MANAGEMENT    Vitals:    04/04/21 0715 04/04/21 1347 04/04/21 1435 04/04/21 1635   BP: 110/69   109/71   BP Location:    Right arm   Pulse: 89  80    Resp: 16  20    Temp: 98 7 °F (37 1 °C)  98 8 °F (37 1 °C)    TempSrc:   Temporal    SpO2: 100% 98% 97%    Weight:       Height:           Most recent labs:    Recent Labs     04/03/21  1250 04/04/21  0429 04/04/21  0430   WBC 9 10  --  7 30   HGB 11 5*  --  11 1*   HCT 37 6*  --  35 7*     --  237   K 3 3* 3 6  --    CALCIUM 7 9* 7 7*  --    BUN 13 10  --    CREATININE 0 63 0 65  --    AST 11*  --   --    ALT 24  --   --    ALKPHOS 44*  --   --        Scheduled Meds:  Current Facility-Administered Medications   Medication Dose Route Frequency Provider Last Rate    acetaminophen  650 mg Oral Q6H PRN Sameer Bethea MD      albuterol  2 puff Inhalation Q4H PRN Sameer Bethea MD      apixaban  5 mg Oral BID Sameer Bethea MD      artificial tear   Both Eyes HS PRN Sameer Bethea MD      budesonide-formoterol  2 puff Inhalation BID Sameer Bethea MD      furosemide  40 mg Oral BID Sameer Bethea MD      gabapentin  300 mg Oral TID Sameer Bethea MD      HYDROmorphone  0 2 mg Intravenous Q4H PRN Sameer Bethea MD      insulin glargine  25 Units Subcutaneous HS Sarath Echavarria MD      insulin lispro  1-5 Units Subcutaneous HS Sameer Bethea MD      insulin lispro  1-6 Units Subcutaneous TID AC Sameer Bethea MD      ipratropium  0 5 mg Nebulization TID Sameer Bethea MD      levalbuterol  1 25 mg Nebulization TID Michi Jang MD      lidocaine  1 patch Topical Daily Sameer Bethea MD      menthol-methyl salicylate   Apply externally 4x Daily PRN Sameer Bethea MD      metoprolol tartrate  12 5 mg Oral Q12H Sameer Bethea MD      montelukast  10 mg Oral HS Sameer Bethea MD      oxyCODONE-acetaminophen  1 tablet Oral Q6H PRN Sameer Bethea MD      pantoprazole  40 mg Oral BID Sameer Bethea MD      predniSONE  5 mg Oral BID With Meals Sameer Bethea MD      theophylline  200 mg Oral Daily Sameer Bethea MD       Continuous Infusions:   PRN Meds:   acetaminophen    albuterol    artificial tear    HYDROmorphone    menthol-methyl salicylate    oxyCODONE-acetaminophen    Surgical procedures (if appropriate):

## 2021-04-04 NOTE — PROGRESS NOTES
[facility]  Progress Note - Jaylen Farrell 1935, 82 y o  female MRN: [id number]  Unit/Bed#: -01 Encounter: [id number]  Primary Care Provider: LEEROY Forbes   Date and time admitted to hospital: 4/3/2021  8:37 AM    * Ambulatory dysfunction  Assessment & Plan  Presented with back pain and inability to ambulate at home  Has known chronic compression fractures  CT scan confirmed this showing chronic lumbar compression fractures  PT/OT assessment pending  Will provide lidocaine patch  Case management    Type 2 diabetes mellitus ([facility] [place] [address] )  Assessment & Plan  New onset diabetes mellitus blood sugar in 300s  Hemoglobin A1c 12 4  Start on Lantus 25 units HS  Patient is prednisone 5 mg daily for COPD  Continue sliding scale    Chronic respiratory failure with hypoxia ([facility] [place] [address] )  Assessment & Plan  Secondary to COPD  Continue home 2 L nasal cannula  Currently baseline    Chronic diastolic CHF (congestive heart failure) ([facility])  Assessment & Plan  Wt Readings from Last 3 Encounters:   04/04/21 60 6 kg (133 lb 9 6 oz)   03/02/21 64 3 kg (141 lb 12 8 oz)   02/17/21 65 3 kg (144 lb)     Continue home beta-blocker  Currently appears euvolemic  Strict input/output        Paroxysmal atrial fibrillation ([facility] [place] [address] )  Assessment & Plan  Currently rate controlled  Continue home Eliquis    Compression fracture of L4 vertebra ([facility])  Assessment & Plan  Chronic  CT scan revealed multiple compression fractures of the L-spine  Would benefit from PT OT as outlined above    Gastroesophageal reflux disease without esophagitis  Assessment & Plan  Continue home PPI    COPD (chronic obstructive pulmonary disease) ([facility])  Assessment & Plan  Not in acute exacerbation  Continue home inhalers    VTE Pharmacologic Prophylaxis:   Pharmacologic: Apixaban (Eliquis)  Mechanical VTE Prophylaxis in Place: Yes    Patient Centered Rounds: I have performed bedside rounds with nursing staff today      Discussions with Specialists or Other Care Team Provider:  Case management and nursing    Education and Discussions with Family / Patient:  Patient    Time Spent for Care: 45 minutes  More than 50% of total time spent on counseling and coordination of care as described above  Current Length of Stay: 0 day(s)    Current Patient Status: Observation   Certification Statement: The patient will continue to require additional inpatient hospital stay due to PT OT assessment    Discharge Plan:  Pending clinical course    Code Status: Level 1 - Full Code      Subjective:   Pending patient seen examined bedside  No acute events overnight  Continues to have back pain  Denies any shortness of breath or chest pain  Objective:     Vitals:   Temp (24hrs), Av 1 °F (36 7 °C), Min:97 2 °F (36 2 °C), Max:98 7 °F (37 1 °C)    Temp:  [97 2 °F (36 2 °C)-98 7 °F (37 1 °C)] 98 7 °F (37 1 °C)  HR:  [63-89] 89  Resp:  [16-30] 16  BP: (105-167)/(54-85) 110/69  SpO2:  [90 %-100 %] 100 %  Body mass index is 26 09 kg/m²  Input and Output Summary (last 24 hours):     No intake or output data in the 24 hours ending 21 0751    Physical Exam:     Physical Exam  Vitals signs and nursing note reviewed  Constitutional:       General: She is not in acute distress  HENT:      Head: Normocephalic  Nose: Nose normal       Mouth/Throat:      Pharynx: Oropharynx is clear  Eyes:      Conjunctiva/sclera: Conjunctivae normal    Cardiovascular:      Rate and Rhythm: Normal rate and regular rhythm  Heart sounds: No murmur  Pulmonary:      Effort: Pulmonary effort is normal  No respiratory distress  Breath sounds: Normal breath sounds  No wheezing  Comments: On 2 L nasal cannula  Abdominal:      General: There is no distension  Tenderness: There is no abdominal tenderness  There is no guarding  Musculoskeletal: Normal range of motion  General: No swelling  Right lower leg: No edema     Skin:     General: Skin is warm       Capillary Refill: Capillary refill takes less than 2 seconds  Neurological:      General: No focal deficit present  Mental Status: She is alert and oriented to person, place, and time  Cranial Nerves: No cranial nerve deficit  Psychiatric:         Mood and Affect: Mood normal            Additional Data:     Labs:    Results from last 7 days   Lab Units 04/04/21  0430 04/03/21  1250   WBC Thousand/uL 7 30 9 10   HEMOGLOBIN g/dL 11 1* 11 5*   HEMATOCRIT % 35 7* 37 6*   PLATELETS Thousands/uL 237 258   LYMPHO PCT %  --  9*   MONO PCT %  --  5     Results from last 7 days   Lab Units 04/04/21  0429 04/03/21  1250   SODIUM mmol/L 138 134   POTASSIUM mmol/L 3 6 3 3*   CHLORIDE mmol/L 95* 93*   CO2 mmol/L 31 30   BUN mg/dL 10 13   CREATININE mg/dL 0 65 0 63   ANION GAP mmol/L 12 11   CALCIUM mg/dL 7 7* 7 9*   ALBUMIN g/dL  --  3 4*   TOTAL BILIRUBIN mg/dL  --  0 50   ALK PHOS U/L  --  44*   ALT U/L  --  24   AST U/L  --  11*   GLUCOSE RANDOM mg/dL 281* 326*         Results from last 7 days   Lab Units 04/04/21  0530 04/03/21  2054 04/03/21  1614   POC GLUCOSE mg/dl 245* 321* 265*     Results from last 7 days   Lab Units 04/03/21  1852   HEMOGLOBIN A1C % 12 4*               * I Have Reviewed All Lab Data Listed Above  * Additional Pertinent Lab Tests Reviewed:  [facility]    Imaging:    Imaging Reports Reviewed Today Include:  X-ray lumbar spine, chest x-ray  Imaging Personally Reviewed by Myself Includes:  None    Recent Cultures (last 7 days):           Last 24 Hours Medication List:   Current Facility-Administered Medications   Medication Dose Route Frequency Provider Last Rate    acetaminophen  650 mg Oral Q6H PRN Sameer Bethea MD      albuterol  2 puff Inhalation Q4H PRN Sameer Bethea MD      apixaban  5 mg Oral BID Sameer Bethea MD      artificial tear   Both Eyes HS PRN Sameer Bethea MD      budesonide-formoterol  2 puff Inhalation BID Sameer Bethea MD      furosemide  40 mg Oral BID Sameer Bethea MD      gabapentin  300 mg Oral TID Sisi Gonsales MD      HYDROmorphone  0 2 mg Intravenous Q4H PRN Sameer Bethea MD      insulin glargine  25 Units Subcutaneous HS Sarath Echavarria MD      insulin lispro  1-5 Units Subcutaneous HS Sameer Bethea MD      insulin lispro  1-6 Units Subcutaneous TID AC Sameer Bethea MD      ipratropium  0 5 mg Nebulization TID Sameer Bethea MD      levalbuterol  1 25 mg Nebulization TID Sisi Gonsales MD      lidocaine  1 patch Topical Daily Sameer Bethea MD      menthol-methyl salicylate   Apply externally 4x Daily PRN Sameer Bethea MD      metoprolol tartrate  12 5 mg Oral Q12H Sameer Bethea MD      montelukast  10 mg Oral HS Sameer Bethea MD      oxyCODONE-acetaminophen  1 tablet Oral Q6H PRN Sameer Bethea MD      pantoprazole  40 mg Oral BID Sameer Bethea MD      predniSONE  5 mg Oral BID With Meals Sisi Gonsales MD      theophylline  200 mg Oral Daily Sisi Gonsales MD          Today, Patient Was Seen By: Raúl Tamayo MD    ** Please Note: Dictation voice to text software may have been used in the creation of this document   **

## 2021-04-04 NOTE — PLAN OF CARE
Problem: PHYSICAL THERAPY ADULT  Goal: Performs mobility at highest level of function for planned discharge setting  See evaluation for individualized goals  Description: Treatment/Interventions: ADL retraining, Functional transfer training, LE strengthening/ROM, Therapeutic exercise, Elevations, Endurance training, Patient/family training, Equipment eval/education, Bed mobility, Gait training, Compensatory technique education, Spoke to nursing, OT  Equipment Recommended: (TBD)       See flowsheet documentation for full assessment, interventions and recommendations  Note: Prognosis: Fair  Problem List: Decreased strength, Decreased endurance, Impaired balance, Decreased mobility, Decreased coordination, Impaired judgement, Decreased safety awareness, Impaired vision, Impaired hearing, Pain  Assessment: Pt is 82 y o  female seen for PT evaluation s/p admit to [facility] on 4/3/2021 w/ Ambulatory dysfunction  PT consulted to assess pt's functional mobility and d/c needs  Order placed for PT eval and tx, w/ up and OOB as tolerated order  Comorbidities affecting pt's physical performance at time of assessment include: weakness,ambulatory dysfunction, chronic CHF, chronic respiratory failure w/hypoxia,compression fx L4, COPD, GERD, A-fib, DM  PTA, pt was independent w/ all functional mobility w/ o AD usage  Personal factors affecting pt at time of IE include: stairs to enter home, inability to ambulate household distances, inability to navigate community distances, unable to perform dynamic tasks in community, hearing impairments, visual impairments, unable to perform physical activity, limited insight into impairments, inability to perform ADLs   Please find objective findings from PT assessment regarding body systems outlined above with impairments and limitations including weakness, impaired balance, decreased endurance, impaired coordination, gait deviations, pain, decreased activity tolerance, decreased functional mobility tolerance, decreased safety awareness, impaired judgement, fall risk and SOB upon exertion  The following objective measures performed on IE also reveal limitations: AM-PAC 6-Clicks: 55/27  Pt's clinical presentation is currently unstable/unpredictable seen in pt's presentation of JOHNSON, abnormal lab values,pain severity  Pt to benefit from continued PT tx to address deficits as defined above and maximize level of functional independent mobility and consistency  From PT/mobility standpoint, recommendation at time of d/c would be PAR pending progress in order to facilitate return to PLOF  Barriers to Discharge: Inaccessible home environment     PT Discharge Recommendation: (S) Post-Acute Rehabilitation Services     PT - OK to Discharge: No    See flowsheet documentation for full assessment

## 2021-04-04 NOTE — ASSESSMENT & PLAN NOTE
Presented with back pain and inability to ambulate at home  Has known chronic compression fractures  CT scan confirmed this showing chronic lumbar compression fractures  PT/OT assessment pending  Will provide lidocaine patch  Case management

## 2021-04-04 NOTE — ASSESSMENT & PLAN NOTE
New onset diabetes mellitus blood sugar in 300s  Hemoglobin A1c 12 4  Start on Lantus 25 units HS  Patient is prednisone 5 mg daily for COPD  Continue sliding scale

## 2021-04-04 NOTE — UTILIZATION REVIEW
Initial Clinical Review    Admission: Date/Time/Statement:   Admission Orders (From admission, onward)     Ordered        04/04/21 1850  Inpatient Admission  Once         04/03/21 1456  Place in Observation  Once                 Placed in observation status on 4/3 changed to inpatient  On 4/4 due to new start on insulin  Orders Placed This Encounter   Procedures    Place in Observation     Standing Status:   Standing     Number of Occurrences:   1     Order Specific Question:   Level of Care     Answer:   Med Surg [16]    Inpatient Admission     Standing Status:   Standing     Number of Occurrences:   1     Order Specific Question:   Level of Care     Answer:   Med Surg [16]     Order Specific Question:   Estimated length of stay     Answer:   More than 2 Midnights     Order Specific Question:   Certification     Answer:   I certify that inpatient services are medically necessary for this patient for a duration of greater than two midnights  See H&P and MD Progress Notes for additional information about the patient's course of treatment  ED Arrival Information     Expected Arrival Acuity Means of Arrival Escorted By Service Admission Type    - 4/3/2021 08:08 Urgent Ambulance [facility] General [facility] Urgent    Arrival Complaint    back pain        Chief Complaint   Patient presents with    Back Pain     lower back pain that radiates up     Assessment/Plan: 81 yo m with a pmh of COPD on Home O2  At 2 L , Afib on Eliquis and  CHF  He presents to the Ed with back pain, in her lower back, persistent similar to her baseline, but now with the inability to walk CT showed  Chronic lumbar compression fractures    She is admitted to observation status due to her ambulatory dysfunction and pain control  7/4 - Pt continues with ambulatory dysfunction - PT/OT eval pending  Blood Sugars in the 300's today requiring New start Insulin,  Lantus,  And sliding scale   HgA1C is 12 4,  She is on chronic prednisone  5 mg for her COPD       ED Triage Vitals [04/03/21 0907]   Temperature Pulse Respirations Blood Pressure SpO2   (!) 97 2 °F (36 2 °C) 66 (!) 26 110/65 98 %      Temp Source Heart Rate Source Patient Position - Orthostatic VS BP Location FiO2 (%)   Temporal Monitor Lying Left arm --      Pain Score       Worst Possible Pain          Wt Readings from Last 1 Encounters:   04/05/21 61 3 kg (135 lb 2 3 oz)     Additional Vital Signs:         Pertinent Labs/Diagnostic Test Results:   Results from last 7 days   Lab Units 04/03/21  1250   SARS-COV-2  Negative     Results from last 7 days   Lab Units 04/04/21  0430 04/03/21  1250   WBC Thousand/uL 7 30 9 10   HEMOGLOBIN g/dL 11 1* 11 5*   HEMATOCRIT % 35 7* 37 6*   PLATELETS Thousands/uL 237 258   TOTAL NEUT ABS Thousand/uL  --  7 64*         Results from last 7 days   Lab Units 04/04/21  0429 04/03/21  1250   SODIUM mmol/L 138 134   POTASSIUM mmol/L 3 6 3 3*   CHLORIDE mmol/L 95* 93*   CO2 mmol/L 31 30   ANION GAP mmol/L 12 11   BUN mg/dL 10 13   CREATININE mg/dL 0 65 0 63   EGFR ml/min/1 73sq m 82 83   CALCIUM mg/dL 7 7* 7 9*     Results from last 7 days   Lab Units 04/03/21  1250   AST U/L 11*   ALT U/L 24   ALK PHOS U/L 44*   TOTAL PROTEIN g/dL 5 6*   ALBUMIN g/dL 3 4*   TOTAL BILIRUBIN mg/dL 0 50     Results from last 7 days   Lab Units 04/05/21  0521 04/04/21 2008 04/04/21  1544 04/04/21  1041 04/04/21  0530 04/03/21  2054 04/03/21  1614   POC GLUCOSE mg/dl 278* 307* 338* 261* 245* 321* 265*     Results from last 7 days   Lab Units 04/04/21  0429 04/03/21  1250   GLUCOSE RANDOM mg/dL 281* 326*         Results from last 7 days   Lab Units 04/03/21  1852   HEMOGLOBIN A1C % 12 4*   EAG mg/dl 309     Results from last 7 days   Lab Units 04/03/21  1250   BNP pg/mL 158*     Results from last 7 days   Lab Units 04/03/21  1250   INFLUENZA A PCR  Negative   INFLUENZA B PCR  Negative   RSV PCR  Negative     Results from last 7 days   Lab Units 04/03/21  1250   TOTAL COUNTED  100       ED Treatment:   Medication Administration from 04/03/2021 0808 to 04/03/2021 1516       Date/Time Order Dose Route Action Comments     04/03/2021 0911 ipratropium-albuterol (DUO-NEB) 0 5-2 5 mg/3 mL inhalation solution 3 mL 3 mL Nebulization Given      04/03/2021 1119 oxyCODONE (ROXICODONE) IR tablet 5 mg 5 mg Oral Given      04/03/2021 1254 morphine injection 2 mg 2 mg Intravenous Given         Past Medical History:   Diagnosis Date    Allergies     Asthma     Chronic [facility]est[facility]     Colitis     Colon polyp     COPD (chronic obstructive pulmonary disease)     Diverticulosis     DJD (degenerative joint disease)     Gait abnormality     Hypertension     Paroxysmal atrial fibrillation ([facility])      Present on Admission:   Paroxysmal atrial fibrillation ([facility])   Gastroesophageal reflux disease without esophagitis   Compression fracture of L4 vertebra ([facility])   Chronic diastolic CHF (congestive heart failure) ([facility])   COPD (chronic obstructive pulmonary disease) ([facility])   Chronic respiratory failure with hypoxia ([facility])      Admitting Diagnosis: Shortness of breath [R06 02]  Back pain [M54 9]  Acute low back pain [M54 5]  Pulmonary vascular congestion [R09 89]  Age/Sex: 82 y o  female       Admission Orders:  Scheduled Medications:  apixaban, 5 mg, Oral, BID  budesonide-formoterol, 2 puff, Inhalation, BID  furosemide, 40 mg, Oral, BID  gabapentin, 300 mg, Oral, TID  insulin glargine, 25 Units, Subcutaneous, HS  insulin lispro, 1-5 Units, Subcutaneous, HS  insulin lispro, 1-6 Units, Subcutaneous, TID AC  ipratropium, 0 5 mg, Nebulization, TID  levalbuterol, 1 25 mg, Nebulization, TID  lidocaine, 1 patch, Topical, Daily  metoprolol tartrate, 12 5 mg, Oral, Q12H  montelukast, 10 mg, Oral, HS  pantoprazole, 40 mg, Oral, BID  predniSONE, 5 mg, Oral, BID With Meals  theophylline, 200 mg, Oral, Daily      Continuous IV Infusions:     PRN Meds:  acetaminophen, 650 mg, Oral, Q6H PRN  albuterol, 2 puff, Inhalation, Q4H PRN  artificial tear, , Both Eyes, HS PRN  HYDROmorphone, 0 2 mg, Intravenous, Q4H PRN  menthol-methyl salicylate, , Apply externally, 4x Daily PRN  oxyCODONE-acetaminophen, 1 tablet, Oral, Q6H PRN            Network Utilization Review Department  ATTENTION: Please call with any questions or concerns to [phone] and carefully listen to the prompts so that you are directed to the right person  All voicemails are confidential   Ke Agosto all requests for admission clinical reviews, approved or denied determinations and any other requests to dedicated fax number below belonging to the campus where the patient is receiving treatment   List of dedicated fax numbers for the Facilities:  [address] DENIALS (Administrative/Medical Necessity) [phone]   [address] (Maternity/NICU/Pediatrics) [phone]   [address] [facility] [address] [place] [facility] Dre [address] (Lizeth Man "Camila" 103) [address] [place] [address] [address] [place] [address] [phone]

## 2021-04-04 NOTE — NURSING NOTE
Patient in bed  States pain is 10/10 in lower back  Spoke with Dr Grayson Lindquist, order one time dose of 0 2mg Dilaudid IV and Bengay  Side rails up x2, call bell and bed side table within reach, bed alarm on  Will continue to monitor

## 2021-04-05 NOTE — NURSING NOTE
Patient resting in bed  Escorted patient to and from bathroom, tolerated fairly well  Rubbed Bengay on back to help with pain; patient stated Bengay help a lot  Offers no other complaints at this time  Side rails up x2, call bell and bed side table within reach, bed alarm on  Will continue to monitor

## 2021-04-05 NOTE — NURSING NOTE
Attempt to demonstrate checking blood glucose ineffective, pt unable to see letters/numbers on literature and glucometer, unable to see clearly enough to differentiate the test strips sides, unable to insert test strip, unable to clearly see blood droplet on finger  Pt states, "I cant see it, Just Call and ask my neighbor to do it a few times a day " General teaching RT using a glucometer talked through, and pt verbalizes understand to major points, however needs a lot of reinforcement  Attending notified  Pt made comfortable, personal needs and call bell within reach, will continue to monitor

## 2021-04-05 NOTE — PROGRESS NOTES
[facility]  Progress Note - Sotero Gudino 1935, 82 y o  female MRN: [id number]  Unit/Bed#: -01 Encounter: [id number]  Primary Care Provider: LEEROY Wesley   Date and time admitted to hospital: 4/3/2021  8:37 AM    * Ambulatory dysfunction  Assessment & Plan  · Presented with back pain and inability to ambulate at home  · Has known chronic compression fractures  · CT scan confirmed this showing chronic lumbar compression fractures  · PT/OT recommend- short-term rehab however the patient refused and would prefer to go home with home care  · Outpatient pain management referral    Type 2 diabetes mellitus without complication, without long-term current use of insulin ([facility] [address] )  Assessment & Plan  · New onset diabetes mellitus blood sugar in 300s  · Hemoglobin A1c 12 4  · Started on Lantus 25 units HS in the hospital  · Very challenging as the patient has limited vision; will discuss case with endocrine for regimen at home  · Patient is prednisone 5 mg daily for COPD  · ISS, will add meal coverage      Chronic diastolic CHF (congestive heart failure) ([facility] [address] )  Assessment & Plan  Wt Readings from Last 3 Encounters:   04/05/21 61 3 kg (135 lb 2 3 oz)   03/02/21 64 3 kg (141 lb 12 8 oz)   02/17/21 65 3 kg (144 lb)     · Continue home beta-blocker  · Currently appears euvolemic          Paroxysmal atrial fibrillation (HCC)  Assessment & Plan  · Currently rate controlled  · Continue home Eliquis    Compression fracture of L4 vertebra (HCC)  Assessment & Plan  · Chronic  · CT scan revealed multiple compression fractures of the L-spine  · Supportive measures     COPD (chronic obstructive pulmonary disease) ([facility] [address] )  Assessment & Plan  · Not in acute exacerbation  · Continue home inhalers        VTE Prophylaxis:  Apixaban (Eliquis)    Patient Centered Rounds: I have performed bedside rounds with nursing staff today      Discussions with Specialists or Other Care Team Provider: Nursing, case management, PT/OT, RT  Education and Discussions with Family / Patient:  Patient and friend via phone    Current Length of Stay: 1 day(s)    Current Patient Status: Inpatient   Certification Statement: The patient will continue to require additional inpatient hospital stay due to Ambulatory gait dysfunction    Discharge Plan:  Pending hospital course    Code Status: Level 1 - Full Code    Subjective:   Feeling about the same continues to have pain on right hip    Objective:     Vitals:   Temp (24hrs), Av 1 °F (36 7 °C), Min:97 5 °F (36 4 °C), Max:98 8 °F (37 1 °C)    Temp:  [97 5 °F (36 4 °C)-98 8 °F (37 1 °C)] 97 9 °F (36 6 °C)  HR:  [61-80] 62  Resp:  [18-20] 18  BP: ()/(58-71) 92/61  SpO2:  [91 %-98 %] 91 %  Body mass index is 26 39 kg/m²  Input and Output Summary (last 24 hours): Intake/Output Summary (Last 24 hours) at 2021 1349  Last data filed at 2021 1212  Gross per 24 hour   Intake 560 ml   Output --   Net 560 ml       Physical Exam:   Physical Exam  Vitals signs and nursing note reviewed  Constitutional:       General: She is not in acute distress  Appearance: Normal appearance  HENT:      Head: Normocephalic and atraumatic  Right Ear: External ear normal       Left Ear: External ear normal       Nose: Nose normal  No rhinorrhea  Mouth/Throat:      Mouth: Mucous membranes are moist       Pharynx: Oropharynx is clear  Eyes:      General:         Right eye: No discharge  Left eye: No discharge  Pupils: Pupils are equal, round, and reactive to light  Neck:      Musculoskeletal: Normal range of motion and neck supple  No muscular tenderness  Cardiovascular:      Rate and Rhythm: Normal rate and regular rhythm  Pulses: Normal pulses  Heart sounds: Normal heart sounds  No murmur  Pulmonary:      Effort: Pulmonary effort is normal  No respiratory distress        Comments: Very decreased    Abdominal:      General: Bowel sounds are normal  There is no distension  Palpations: Abdomen is soft  There is no mass  Tenderness: There is no abdominal tenderness  Musculoskeletal: Normal range of motion  General: No swelling or tenderness  Skin:     General: Skin is warm and dry  Capillary Refill: Capillary refill takes less than 2 seconds  Findings: No erythema or rash  Neurological:      General: No focal deficit present  Mental Status: She is alert and oriented to person, place, and time  Mental status is at baseline  Psychiatric:         Mood and Affect: Mood normal          Behavior: Behavior normal          Thought Content: Thought content normal          Additional Data:     Labs:    Results from last 7 days   Lab Units 04/04/21  0430 04/03/21  1250   WBC Thousand/uL 7 30 9 10   HEMOGLOBIN g/dL 11 1* 11 5*   HEMATOCRIT % 35 7* 37 6*   PLATELETS Thousands/uL 237 258   LYMPHO PCT %  --  9*   MONO PCT %  --  5     Results from last 7 days   Lab Units 04/04/21  0429 04/03/21  1250   SODIUM mmol/L 138 134   POTASSIUM mmol/L 3 6 3 3*   CHLORIDE mmol/L 95* 93*   CO2 mmol/L 31 30   BUN mg/dL 10 13   CREATININE mg/dL 0 65 0 63   CALCIUM mg/dL 7 7* 7 9*   ALK PHOS U/L  --  44*   ALT U/L  --  24   AST U/L  --  11*         Results from last 7 days   Lab Units 04/05/21  1058 04/05/21  0521 04/04/21  2008 04/04/21  1544 04/04/21  1041 04/04/21  0530 04/03/21  2054 04/03/21  1614   POC GLUCOSE mg/dl 241* 278* 307* 338* 261* 245* 321* 265*     Results from last 7 days   Lab Units 04/03/21  1852   HEMOGLOBIN A1C % 12 4*       * I Have Reviewed All Lab Data Listed Above  * Additional Pertinent Lab Tests Reviewed:  [facility] Admission  Reviewed    Imaging:  Imaging Reports Reviewed Today Include: CT lumbar    Recent Cultures (last 7 days):           Last 24 Hours Medication List:   Current Facility-Administered Medications   Medication Dose Route Frequency Provider Last Rate    acetaminophen 650 mg Oral Q6H PRN Sameer Bethea MD      albuterol  2 puff Inhalation Q4H PRN Sameer Bethea MD      apixaban  5 mg Oral BID Sameer Bethea MD      artificial tear   Both Eyes HS PRN Sameer Bethea MD      budesonide-formoterol  2 puff Inhalation BID Sameer Bethea MD      furosemide  40 mg Oral BID Sameer Bethea MD      gabapentin  300 mg Oral TID Eli Patten MD      HYDROmorphone  0 2 mg Intravenous Q4H PRN Sameer Bethea MD      insulin glargine  25 Units Subcutaneous HS Sarath Echavarria MD      insulin lispro  1-5 Units Subcutaneous HS Sameer Bethea MD      insulin lispro  1-6 Units Subcutaneous TID AC Sameer Bethea MD      ipratropium  0 5 mg Nebulization TID Sameer Bethea MD      levalbuterol  1 25 mg Nebulization TID Eli Patten MD      lidocaine  1 patch Topical Daily Sameer Bethea MD      menthol-methyl salicylate   Apply externally 4x Daily PRN Sameer Bethea MD      metoprolol tartrate  12 5 mg Oral Q12H Sameer Bethea MD      montelukast  10 mg Oral HS Sameer Bethea MD      oxyCODONE-acetaminophen  1 tablet Oral Q6H PRN Sameer Bethea MD      pantoprazole  40 mg Oral BID Sameer Bethea MD      predniSONE  5 mg Oral BID With Meals Eli Patten MD      theophylline  200 mg Oral Daily Eli Patten MD          Today, Patient Was Seen By: LEEROY Villalta    ** Please Note: Dictation voice to text software may have been used in the creation of this document   **

## 2021-04-05 NOTE — PLAN OF CARE
Problem: Potential for Falls  Goal: Patient will remain free of falls  Description: INTERVENTIONS:  - Assess patient frequently for physical needs  -  Identify cognitive and physical deficits and behaviors that affect risk of falls    -  [place] fall precautions as indicated by assessment   - Educate patient/family on patient safety including physical limitations  - Instruct patient to call for assistance with activity based on assessment  - Modify environment to reduce risk of injury  - Consider OT/PT consult to assist with strengthening/mobility  Outcome: Progressing     Problem: PAIN - ADULT  Goal: Verbalizes/displays adequate comfort level or baseline comfort level  Description: Interventions:  - Encourage patient to monitor pain and request assistance  - Assess pain using appropriate pain scale  - Administer analgesics based on type and severity of pain and evaluate response  - Implement non-pharmacological measures as appropriate and evaluate response  - Consider cultural and social influences on pain and pain management  - Notify physician/advanced practitioner if interventions unsuccessful or patient reports new pain  Outcome: Progressing     Problem: INFECTION - ADULT  Goal: Absence or prevention of progression during hospitalization  Description: INTERVENTIONS:  - Assess and monitor for signs and symptoms of infection  - Monitor lab/diagnostic results  - Monitor all insertion sites, i e  indwelling lines, tubes, and drains  - Monitor endotracheal if appropriate and nasal secretions for changes in amount and color  - [place] appropriate cooling/warming therapies per order  - Administer medications as ordered  - Instruct and encourage patient and family to use good hand hygiene technique  - Identify and instruct in appropriate isolation precautions for identified infection/condition  Outcome: Progressing  Goal: Absence of fever/infection during neutropenic period  Description: INTERVENTIONS:  - Monitor WBC    Outcome: Progressing     Problem: SAFETY ADULT  Goal: Patient will remain free of falls  Description: INTERVENTIONS:  - Assess patient frequently for physical needs  -  Identify cognitive and physical deficits and behaviors that affect risk of falls    -  [place] fall precautions as indicated by assessment   - Educate patient/family on patient safety including physical limitations  - Instruct patient to call for assistance with activity based on assessment  - Modify environment to reduce risk of injury  - Consider OT/PT consult to assist with strengthening/mobility  Outcome: Progressing  Goal: Maintain or return to baseline ADL function  Description: INTERVENTIONS:  -  Assess patient's ability to carry out ADLs; assess patient's baseline for ADL function and identify physical deficits which impact ability to perform ADLs (bathing, care of mouth/teeth, toileting, grooming, dressing, etc )  - Assess/evaluate cause of self-care deficits   - Assess range of motion  - Assess patient's mobility; develop plan if impaired  - Assess patient's need for assistive devices and provide as appropriate  - Encourage maximum independence but intervene and supervise when necessary  - Involve family in performance of ADLs  - Assess for home care needs following discharge   - Consider OT consult to assist with ADL evaluation and planning for discharge  - Provide patient education as appropriate  Outcome: Progressing  Goal: Maintain or return mobility status to optimal level  Description: INTERVENTIONS:  - Assess patient's baseline mobility status (ambulation, transfers, stairs, etc )    - Identify cognitive and physical deficits and behaviors that affect mobility  - Identify mobility aids required to assist with transfers and/or ambulation (gait belt, sit-to-stand, lift, walker, cane, etc )  - [place] fall precautions as indicated by assessment  - Record patient progress and toleration of activity level on Mobility SBAR; progress patient to next Phase/Stage  - Instruct patient to call for assistance with activity based on assessment  - Consider rehabilitation consult to assist with strengthening/weightbearing, etc   Outcome: Progressing     Problem: DISCHARGE PLANNING  Goal: Discharge to home or other facility with appropriate resources  Description: INTERVENTIONS:  - Identify barriers to discharge w/patient and caregiver  - Arrange for needed discharge resources and transportation as appropriate  - Identify discharge learning needs (meds, wound care, etc )  - Refer to Case Management Department for coordinating discharge planning if the patient needs post-hospital services based on physician/advanced practitioner order or complex needs related to functional status, cognitive ability, or social support system  Outcome: Progressing     Problem: Knowledge Deficit  Goal: Patient/family/caregiver demonstrates understanding of disease process, treatment plan, medications, and discharge instructions  Description: Complete learning assessment and assess knowledge base    Interventions:  - Provide teaching at level of understanding  - Provide teaching via preferred learning methods  Outcome: Progressing     Problem: Prexisting or High Potential for Compromised Skin Integrity  Goal: Skin integrity is maintained or improved  Description: INTERVENTIONS:  - Identify patients at risk for skin breakdown  - Assess and monitor skin integrity  - Assess and monitor nutrition and hydration status  - Monitor labs   - Assess for incontinence   - Turn and reposition patient  - Assist with mobility/ambulation  - Relieve pressure over bony prominences  - Avoid friction and shearing  - Provide appropriate hygiene as needed including keeping skin clean and dry  - Evaluate need for skin moisturizer/barrier cream  - Collaborate with interdisciplinary team   - Patient/family teaching  - Consider wound care consult   Outcome: Progressing

## 2021-04-05 NOTE — PLAN OF CARE
Problem: PAIN - ADULT  Goal: Verbalizes/displays adequate comfort level or baseline comfort level  Description: Interventions:  - Encourage patient to monitor pain and request assistance  - Assess pain using appropriate pain scale  - Administer analgesics based on type and severity of pain and evaluate response  - Implement non-pharmacological measures as appropriate and evaluate response  - Consider cultural and social influences on pain and pain management  - Notify physician/advanced practitioner if interventions unsuccessful or patient reports new pain  Outcome: Progressing     Problem: INFECTION - ADULT  Goal: Absence or prevention of progression during hospitalization  Description: INTERVENTIONS:  - Assess and monitor for signs and symptoms of infection  - Monitor lab/diagnostic results  - Monitor all insertion sites, i e  indwelling lines, tubes, and drains  - Monitor endotracheal if appropriate and nasal secretions for changes in amount and color  - [place] appropriate cooling/warming therapies per order  - Administer medications as ordered  - Instruct and encourage patient and family to use good hand hygiene technique  - Identify and instruct in appropriate isolation precautions for identified infection/condition  Outcome: Progressing  Goal: Absence of fever/infection during neutropenic period  Description: INTERVENTIONS:  - Monitor WBC    Outcome: Progressing     Problem: SAFETY ADULT  Goal: Patient will remain free of falls  Description: INTERVENTIONS:  - Assess patient frequently for physical needs  -  Identify cognitive and physical deficits and behaviors that affect risk of falls    -  [place] fall precautions as indicated by assessment   - Educate patient/family on patient safety including physical limitations  - Instruct patient to call for assistance with activity based on assessment  - Modify environment to reduce risk of injury  - Consider OT/PT consult to assist with strengthening/mobility  Outcome: Progressing  Goal: Maintain or return to baseline ADL function  Description: INTERVENTIONS:  -  Assess patient's ability to carry out ADLs; assess patient's baseline for ADL function and identify physical deficits which impact ability to perform ADLs (bathing, care of mouth/teeth, toileting, grooming, dressing, etc )  - Assess/evaluate cause of self-care deficits   - Assess range of motion  - Assess patient's mobility; develop plan if impaired  - Assess patient's need for assistive devices and provide as appropriate  - Encourage maximum independence but intervene and supervise when necessary  - Involve family in performance of ADLs  - Assess for home care needs following discharge   - Consider OT consult to assist with ADL evaluation and planning for discharge  - Provide patient education as appropriate  Outcome: Progressing  Goal: Maintain or return mobility status to optimal level  Description: INTERVENTIONS:  - Assess patient's baseline mobility status (ambulation, transfers, stairs, etc )    - Identify cognitive and physical deficits and behaviors that affect mobility  - Identify mobility aids required to assist with transfers and/or ambulation (gait belt, sit-to-stand, lift, walker, cane, etc )  - [place] fall precautions as indicated by assessment  - Record patient progress and toleration of activity level on Mobility SBAR; progress patient to next Phase/Stage  - Instruct patient to call for assistance with activity based on assessment  - Consider rehabilitation consult to assist with strengthening/weightbearing, etc   Outcome: Progressing     Problem: DISCHARGE PLANNING  Goal: Discharge to home or other facility with appropriate resources  Description: INTERVENTIONS:  - Identify barriers to discharge w/patient and caregiver  - Arrange for needed discharge resources and transportation as appropriate  - Identify discharge learning needs (meds, wound care, etc )  - Arrange for interpretive services to assist at discharge as needed  - Refer to Case Management Department for coordinating discharge planning if the patient needs post-hospital services based on physician/advanced practitioner order or complex needs related to functional status, cognitive ability, or social support system  Outcome: Progressing     Problem: Knowledge Deficit  Goal: Patient/family/caregiver demonstrates understanding of disease process, treatment plan, medications, and discharge instructions  Description: Complete learning assessment and assess knowledge base    Interventions:  - Provide teaching at level of understanding  - Provide teaching via preferred learning methods  Outcome: Progressing     Problem: RESPIRATORY - ADULT  Goal: Achieves optimal ventilation and oxygenation  Description: INTERVENTIONS:  - Assess for changes in respiratory status  - Assess for changes in mentation and behavior  - Position to facilitate oxygenation and minimize respiratory effort  - Oxygen administered by appropriate delivery if ordered  - Initiate smoking cessation education as indicated  - Encourage broncho-pulmonary hygiene including cough, deep breathe, Incentive Spirometry  - Assess the need for suctioning and aspirate as needed  - Assess and instruct to report SOB or any respiratory difficulty  - Respiratory Therapy support as indicated  Outcome: Progressing     Problem: METABOLIC, FLUID AND ELECTROLYTES - ADULT  Goal: Electrolytes maintained within normal limits  Description: INTERVENTIONS:  - Monitor labs and assess patient for signs and symptoms of electrolyte imbalances  - Administer electrolyte replacement as ordered  - Monitor response to electrolyte replacements, including repeat lab results as appropriate  - Instruct patient on fluid and nutrition as appropriate  Outcome: Progressing  Goal: Fluid balance maintained  Description: INTERVENTIONS:  - Monitor labs   - Monitor I/O and WT  - Instruct patient on fluid and nutrition as appropriate  - Assess for signs & symptoms of volume excess or deficit  Outcome: Progressing  Goal: Glucose maintained within target range  Description: INTERVENTIONS:  - Monitor Blood Glucose as ordered  - Assess for signs and symptoms of hyperglycemia and hypoglycemia  - Administer ordered medications to maintain glucose within target range  - Assess nutritional intake and initiate nutrition service referral as needed  Outcome: Progressing     Problem: SKIN/TISSUE INTEGRITY - ADULT  Goal: Skin integrity remains intact  Description: INTERVENTIONS  - Identify patients at risk for skin breakdown  - Assess and monitor skin integrity  - Assess and monitor nutrition and hydration status  - Monitor labs (i e  albumin)  - Assess for incontinence   - Turn and reposition patient  - Assist with mobility/ambulation  - Relieve pressure over bony prominences  - Avoid friction and shearing  - Provide appropriate hygiene as needed including keeping skin clean and dry  - Evaluate need for skin moisturizer/barrier cream  - Collaborate with interdisciplinary team (i e  Nutrition, Rehabilitation, etc )   - Patient/family teaching  Outcome: Progressing  Goal: Incision(s), wounds(s) or drain site(s) healing without S/S of infection  Description: INTERVENTIONS  - Assess and document risk factors for skin impairment   - Assess and document dressing, incision, wound bed, drain sites and surrounding tissue  - Consider nutrition services referral as needed  - Oral mucous membranes remain intact  - Provide patient/ family education  Outcome: Progressing  Goal: Oral mucous membranes remain intact  Description: INTERVENTIONS  - Assess oral mucosa and hygiene practices  - Implement preventative oral hygiene regimen  - Implement oral medicated treatments as ordered  - Initiate Nutrition services referral as needed  Outcome: Progressing     Problem: MUSCULOSKELETAL - ADULT  Goal: Maintain or return mobility to safest level of function  Description: INTERVENTIONS:  - Assess patient's ability to carry out ADLs; assess patient's baseline for ADL function and identify physical deficits which impact ability to perform ADLs (bathing, care of mouth/teeth, toileting, grooming, dressing, etc )  - Assess/evaluate cause of self-care deficits   - Assess range of motion  - Assess patient's mobility  - Assess patient's need for assistive devices and provide as appropriate  - Encourage maximum independence but intervene and supervise when necessary  - Involve family in performance of ADLs  - Assess for home care needs following discharge   - Consider OT consult to assist with ADL evaluation and planning for discharge  - Provide patient education as appropriate  Outcome: Progressing

## 2021-04-05 NOTE — ASSESSMENT & PLAN NOTE
· Presented with back pain and inability to ambulate at home  · Has known chronic compression fractures  · CT scan confirmed this showing chronic lumbar compression fractures  · PT/OT recommend- short-term rehab however the patient refused and would prefer to go home with home care  · Outpatient pain management referral

## 2021-04-05 NOTE — NURSING NOTE
Attempts to provide pt education RT diabetic education, pt refuses at this time, will attempt at 1630

## 2021-04-05 NOTE — ASSESSMENT & PLAN NOTE
· New onset diabetes mellitus blood sugar in 300s  · Hemoglobin A1c 12 4  · Started on Lantus 25 units HS in the hospital  · Very challenging as the patient has limited vision; will discuss case with endocrine for regimen at home  · Patient is prednisone 5 mg daily for COPD  · ISS, will add meal coverage

## 2021-04-05 NOTE — ASSESSMENT & PLAN NOTE
Wt Readings from Last 3 Encounters:   04/05/21 61 3 kg (135 lb 2 3 oz)   03/02/21 64 3 kg (141 lb 12 8 oz)   02/17/21 65 3 kg (144 lb)     · Continue home beta-blocker  · Currently appears euvolemic

## 2021-04-06 NOTE — ASSESSMENT & PLAN NOTE
Wt Readings from Last 3 Encounters:   04/06/21 60 8 kg (134 lb 0 6 oz)   03/02/21 64 3 kg (141 lb 12 8 oz)   02/17/21 65 3 kg (144 lb)     · Continue home beta-blocker and furosemide  · Currently appears euvolemic

## 2021-04-06 NOTE — ASSESSMENT & PLAN NOTE
· New onset diabetes mellitus blood sugar in 300s  · Hemoglobin A1c 12 4  · Started on Lantus 25 units HS in the hospital- transition to oral regimen  · Discussed case with endocrinology on-call- recommend start on metformin 500 b i d , Januvia 50 mg daily  The patient will need endocrinology follow-up as an outpatient    · Very challenging as the patient has limited vision; will discuss case with endocrine for regimen at home  · Patient is prednisone 5 mg daily for COPD  · ISS with meal coverage

## 2021-04-06 NOTE — ASSESSMENT & PLAN NOTE
· Presented with back pain and inability to ambulate at home  · Has known chronic compression fractures  · CT scan confirmed this showing chronic lumbar compression fractures  · PT/OT recommend- short-term rehab however the patient refused and would prefer to go home with home care  · improving  · Outpatient pain management referral

## 2021-04-06 NOTE — PLAN OF CARE
Problem: PAIN - ADULT  Goal: Verbalizes/displays adequate comfort level or baseline comfort level  Description: Interventions:  - Encourage patient to monitor pain and request assistance  - Assess pain using appropriate pain scale  - Administer analgesics based on type and severity of pain and evaluate response  - Implement non-pharmacological measures as appropriate and evaluate response  - Consider cultural and social influences on pain and pain management  - Notify physician/advanced practitioner if interventions unsuccessful or patient reports new pain  Outcome: Progressing     Problem: INFECTION - ADULT  Goal: Absence or prevention of progression during hospitalization  Description: INTERVENTIONS:  - Assess and monitor for signs and symptoms of infection  - Monitor lab/diagnostic results  - Monitor all insertion sites, i e  indwelling lines, tubes, and drains  - Monitor endotracheal if appropriate and nasal secretions for changes in amount and color  - [place] appropriate cooling/warming therapies per order  - Administer medications as ordered  - Instruct and encourage patient and family to use good hand hygiene technique  - Identify and instruct in appropriate isolation precautions for identified infection/condition  Outcome: Progressing  Goal: Absence of fever/infection during neutropenic period  Description: INTERVENTIONS:  - Monitor WBC    Outcome: Progressing     Problem: SAFETY ADULT  Goal: Patient will remain free of falls  Description: INTERVENTIONS:  - Assess patient frequently for physical needs  -  Identify cognitive and physical deficits and behaviors that affect risk of falls    -  [place] fall precautions as indicated by assessment   - Educate patient/family on patient safety including physical limitations  - Instruct patient to call for assistance with activity based on assessment  - Modify environment to reduce risk of injury  - Consider OT/PT consult to assist with strengthening/mobility  Outcome: Progressing  Goal: Maintain or return to baseline ADL function  Description: INTERVENTIONS:  -  Assess patient's ability to carry out ADLs; assess patient's baseline for ADL function and identify physical deficits which impact ability to perform ADLs (bathing, care of mouth/teeth, toileting, grooming, dressing, etc )  - Assess/evaluate cause of self-care deficits   - Assess range of motion  - Assess patient's mobility; develop plan if impaired  - Assess patient's need for assistive devices and provide as appropriate  - Encourage maximum independence but intervene and supervise when necessary  - Involve family in performance of ADLs  - Assess for home care needs following discharge   - Consider OT consult to assist with ADL evaluation and planning for discharge  - Provide patient education as appropriate  Outcome: Progressing  Goal: Maintain or return mobility status to optimal level  Description: INTERVENTIONS:  - Assess patient's baseline mobility status (ambulation, transfers, stairs, etc )    - Identify cognitive and physical deficits and behaviors that affect mobility  - Identify mobility aids required to assist with transfers and/or ambulation (gait belt, sit-to-stand, lift, walker, cane, etc )  - [place] fall precautions as indicated by assessment  - Record patient progress and toleration of activity level on Mobility SBAR; progress patient to next Phase/Stage  - Instruct patient to call for assistance with activity based on assessment  - Consider rehabilitation consult to assist with strengthening/weightbearing, etc   Outcome: Progressing     Problem: DISCHARGE PLANNING  Goal: Discharge to home or other facility with appropriate resources  Description: INTERVENTIONS:  - Identify barriers to discharge w/patient and caregiver  - Arrange for needed discharge resources and transportation as appropriate  - Identify discharge learning needs (meds, wound care, etc )  - Arrange for interpretive services to assist at discharge as needed  - Refer to Case Management Department for coordinating discharge planning if the patient needs post-hospital services based on physician/advanced practitioner order or complex needs related to functional status, cognitive ability, or social support system  Outcome: Progressing     Problem: Knowledge Deficit  Goal: Patient/family/caregiver demonstrates understanding of disease process, treatment plan, medications, and discharge instructions  Description: Complete learning assessment and assess knowledge base    Interventions:  - Provide teaching at level of understanding  - Provide teaching via preferred learning methods  Outcome: Progressing     Problem: RESPIRATORY - ADULT  Goal: Achieves optimal ventilation and oxygenation  Description: INTERVENTIONS:  - Assess for changes in respiratory status  - Assess for changes in mentation and behavior  - Position to facilitate oxygenation and minimize respiratory effort  - Oxygen administered by appropriate delivery if ordered  - Initiate smoking cessation education as indicated  - Encourage broncho-pulmonary hygiene including cough, deep breathe, Incentive Spirometry  - Assess the need for suctioning and aspirate as needed  - Assess and instruct to report SOB or any respiratory difficulty  - Respiratory Therapy support as indicated  Outcome: Progressing     Problem: METABOLIC, FLUID AND ELECTROLYTES - ADULT  Goal: Electrolytes maintained within normal limits  Description: INTERVENTIONS:  - Monitor labs and assess patient for signs and symptoms of electrolyte imbalances  - Administer electrolyte replacement as ordered  - Monitor response to electrolyte replacements, including repeat lab results as appropriate  - Instruct patient on fluid and nutrition as appropriate  Outcome: Progressing  Goal: Fluid balance maintained  Description: INTERVENTIONS:  - Monitor labs   - Monitor I/O and WT  - Instruct patient on fluid and nutrition as appropriate  - Assess for signs & symptoms of volume excess or deficit  Outcome: Progressing  Goal: Glucose maintained within target range  Description: INTERVENTIONS:  - Monitor Blood Glucose as ordered  - Assess for signs and symptoms of hyperglycemia and hypoglycemia  - Administer ordered medications to maintain glucose within target range  - Assess nutritional intake and initiate nutrition service referral as needed  Outcome: Progressing     Problem: SKIN/TISSUE INTEGRITY - ADULT  Goal: Skin integrity remains intact  Description: INTERVENTIONS  - Identify patients at risk for skin breakdown  - Assess and monitor skin integrity  - Assess and monitor nutrition and hydration status  - Monitor labs (i e  albumin)  - Assess for incontinence   - Turn and reposition patient  - Assist with mobility/ambulation  - Relieve pressure over bony prominences  - Avoid friction and shearing  - Provide appropriate hygiene as needed including keeping skin clean and dry  - Evaluate need for skin moisturizer/barrier cream  - Collaborate with interdisciplinary team (i e  Nutrition, Rehabilitation, etc )   - Patient/family teaching  Outcome: Progressing  Goal: Incision(s), wounds(s) or drain site(s) healing without S/S of infection  Description: INTERVENTIONS  - Assess and document risk factors for skin impairment   - Assess and document dressing, incision, wound bed, drain sites and surrounding tissue  - Consider nutrition services referral as needed  - Oral mucous membranes remain intact  - Provide patient/ family education  Outcome: Progressing  Goal: Oral mucous membranes remain intact  Description: INTERVENTIONS  - Assess oral mucosa and hygiene practices  - Implement preventative oral hygiene regimen  - Implement oral medicated treatments as ordered  - Initiate Nutrition services referral as needed  Outcome: Progressing     Problem: MUSCULOSKELETAL - ADULT  Goal: Maintain or return mobility to safest level of function  Description: INTERVENTIONS:  - Assess patient's ability to carry out ADLs; assess patient's baseline for ADL function and identify physical deficits which impact ability to perform ADLs (bathing, care of mouth/teeth, toileting, grooming, dressing, etc )  - Assess/evaluate cause of self-care deficits   - Assess range of motion  - Assess patient's mobility  - Assess patient's need for assistive devices and provide as appropriate  - Encourage maximum independence but intervene and supervise when necessary  - Involve family in performance of ADLs  - Assess for home care needs following discharge   - Consider OT consult to assist with ADL evaluation and planning for discharge  - Provide patient education as appropriate  Outcome: Progressing     Problem: Nutrition/Hydration-ADULT  Goal: Nutrient/Hydration intake appropriate for improving, restoring or maintaining nutritional needs  Description: Monitor and assess patient's nutrition/hydration status for malnutrition  Collaborate with interdisciplinary team and initiate plan and interventions as ordered  Monitor patient's weight and dietary intake as ordered or per policy  Utilize nutrition screening tool and intervene as necessary  Determine patient's food preferences and provide high-protein, high-caloric foods as appropriate       INTERVENTIONS:  - Monitor oral intake, urinary output, labs, and treatment plans  - Assess nutrition and hydration status and recommend course of action  - Evaluate amount of meals eaten  - Assist patient with eating if necessary   - Allow adequate time for meals  - Recommend/ encourage appropriate diets, oral nutritional supplements, and vitamin/mineral supplements  - Order, calculate, and assess calorie counts as needed  - Recommend, monitor, and adjust tube feedings and TPN/PPN based on assessed needs  - Assess need for intravenous fluids  - Provide specific nutrition/hydration education as appropriate  - Include patient/family/caregiver in decisions related to nutrition  Outcome: Progressing

## 2021-04-06 NOTE — OCCUPATIONAL THERAPY NOTE
Occupational Therapy Evaluation      [facility]    4/6/2021    Patient Active Problem List   Diagnosis    COPD (chronic obstructive pulmonary disease) ([facility] [address] )    Cardiac disease    Diverticulosis of intestine with bleeding    Diarrhea    Colorectal polyps    Effusion of bursa of left elbow    Cardiomegaly    Chronic anxiety    Chronic cystitis    Chronic right-sided low back pain without sciatica    Congestive heart failure (HCC)    Deviated nasal septum    Diverticulosis of colon    Gastroesophageal reflux disease without esophagitis    Gout    History of angioedema    History of colonic polyps    Lumbar radiculopathy    Macular degeneration of both eyes    Obesity    Osteoporosis    Other specified forms of chronic ischemic heart disease    Seasonal allergies    Panic attack    Vocal cord dysfunction    Colitis presumed infectious    Atrial fibrillation with rapid ventricular response (HCC)    Left elbow pain    Acute on chronic respiratory failure with hypoxia (HCC)    Debility    Positive culture findings in sputum    Compression fracture of L4 vertebra (HCC)    Closed wedge compression fracture of T12 vertebra (HCC)    Wound dehiscence, surgical, subsequent encounter    Chronic peptic ulcer of stomach    Chronic pain syndrome    Sacroiliitis (HCC)    Bronchitis, chronic, simple (HCC)    Paroxysmal atrial fibrillation (HCC)    Chronic diastolic CHF (congestive heart failure) (HCC)    SIRS (systemic inflammatory response syndrome) (HCC)    Hypokalemia    Shortness of breath    Gastrostomy complication, unspecified (HCC)    Anxiety    Chronic respiratory failure with hypoxia (HCC)    Absolute anemia    Encounter for long-term opiate analgesic use    Uncomplicated opioid dependence ([facility] [address] )    Type 2 diabetes mellitus without complication, without long-term current use of insulin (HCC)    Ambulatory dysfunction       Past Medical History:   Diagnosis Date    Allergies     Asthma     Chronic diastolic CHF (congestive heart failure) (HCC)     Colitis     Colon polyp     COPD (chronic obstructive pulmonary disease)     Diverticulosis     DJD (degenerative joint disease)     Gait abnormality     Hypertension     Paroxysmal atrial fibrillation (HCC)        Past Surgical History:   Procedure Laterality Date    BLADDER SURGERY      COLON SURGERY      COLONOSCOPY      COLONOSCOPY W/ POLYPECTOMY N/A 1/21/2019    Procedure: COLONOSCOPY W/ POLYPECTOMY;  Surgeon: Patricia Torres MD;  Location: [address] [place] [address] GI LAB; Service: General    FL GUIDED NEEDLE PLAC BX/ASP/INJ  9/2/2020    FL GUIDED NEEDLE PLAC BX/ASP/INJ  1/21/2021    GASTROJEJUNOSTOMY W/ JEJUNOSTOMY TUBE N/A 2/3/2020    Procedure: Antrectomy with bilroth II Anastomosis; Surgeon: Trina Avila MD;  [facility];  Service: General   Ran Nikhil JOINT Right 9/2/2020    Procedure: BLOCK / INJECTION SACROILIAC;  Surgeon: Darya Valentin MD;  Location: [facility];  Service: Pain Management     SD INJECTION,SACROILIAC JOINT Right 1/21/2021    Procedure: RIGHT SACROILIAC JOINT INJECTION;  Surgeon: Darya Valentin MD;  Location: [facility];  Service: Pain Management     SMALL INTESTINE SURGERY  03/2020    with partial gastrectomy        04/06/21 0843   OT Last Visit   OT Visit Date 04/06/21   Note Type   Note type Evaluation   Restrictions/Precautions   Weight Bearing Precautions Per Order No   Other Precautions Chair Alarm; Bed Alarm; Fall Risk;Pain;O2;Visual impairment;Hard of hearing  (2L O2 via NC  Pt reports 3 L O2 PRN at baseline )   Pain Assessment   Pain Assessment Tool 0-10   Pain Score 8   Pain Location/Orientation Orientation: Lower;Orientation: Mid;Location: Back   Pain Onset/Description Onset: Ongoing;Frequency: Constant/Continuous   Hospital Pain Intervention(s) Repositioned; Ambulation/increased activity; Other (Comment)  (pain patch applied at beginning of session by RN Kiesha)   Multiple Pain Sites No   Home Living   Type of [address] One level;Stairs to enter with rails; Performs ADLs on one level; Able to live on main level with bedroom/bathroom  (3 SAVANNAH c HR )   Bathroom Shower/Tub Tub/shower unit   Bathroom Toilet Standard   Bathroom Equipment Grab bars in shower;Grab bars around toilet   [facility]  (Pt reports no AD used at baseline, RW available  )   Prior Function   Level of [place] Independent with ADLs and functional mobility; Needs assistance with IADLs   Lives With Alone   Receives Help From Family   ADL Assistance Independent   IADLs Needs Assistance  (medication management, meal preparation )   Falls in the last 6 months 0   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 )   Comments (-) driving  HHA comes every morning to assist c medication and meal preparation for the day  Lifestyle   Autonomy Pt lives in 1 story house with 3 SAVANNAH c HR  Pt reports PLOF as (I) c ADLs, needs assistance with IADLs, works full time, and does not drive  Pt reports no use of AD at baseline    [employer] home health assistant "Karol Lindsay"    Service to Others works full time as aide on school bus    ADL   Eating Assistance 5  Supervision/Setup   Grooming Assistance 5  Supervision/Setup   [address] 4  Minimal Assistance   LB [address] 3  Moderate Assistance    [facility] 4  [address] 3  Moderate [address]  3  Moderate Assistance   Additional Comments Pt limited by decreased endurance and strength and visual impairment    Bed Mobility   Supine to Sit   (DNT: pt seated at EOB upon arrival)   Sit to Supine 5  Supervision   Additional items Assist x 1;HOB elevated; Increased time required;Verbal cues   Transfers   Sit to Stand 4  Minimal assistance   Additional items Assist x 1;Bedrails; Increased time required;Verbal cues  (c RW)   Stand to Sit 4  Minimal assistance   Additional items Assist x 1; Increased time required;Verbal cues; Bedrails  (c RW)   Additional Comments Pt denied lightheadedness/dizziness c positional changes    Functional Mobility   Functional Mobility 4  Minimal assistance  (x1)   Additional Comments Pt ambulated short distance within room with mild s/s of exertion, but no lightheaded/dizziness  SpO2 following ambulation trial: 94-96%  No overt LOB noted    Additional items Rolling walker   Balance   Static Sitting Fair +   Dynamic Sitting Fair   Static Standing Fair -   Dynamic Standing Poor +   Activity Tolerance   Activity Tolerance Patient limited by fatigue;Treatment limited secondary to medical complications (Comment)  (JOHNSON )   Nurse Made Aware TATUM Quijano present upon arrival, made aware of outcomes    RUE Assessment   RUE Assessment WFL  (Full AROM, 3+/5 MMT )   LUE Assessment   LUE Assessment WFL  (Full AROM, 3+/5 MMT )   Hand Function   Gross Motor Coordination Functional   Fine Motor Coordination Functional   Sensation   Light Touch No apparent deficits  (per pt )   Vision-Basic Assessment   Current Vision Does not wear glasses   Visual History Other (Comment)  (macular degeneration B eyes at baseline per chart review )   Patient Visual Report Other (Comment)  (Pt reports able to see some colors/shapes  )   Vision - Complex Assessment   Tracking L eye does not track medially;R eye does not track medially;L eye does not track laterally;R eye does not track laterally; Unable to test secondary to decreased visual attention  (visually unable to maintain focus on object )   Saccades Unable to test secondary to decreased visual attention   Visual Fields Difficulty detecting stimulus with OS RUQ; Difficulty detecting stimulus with OD RUQ   Acuity   (Pt unable to read letters on name badge)   Visual Screen Results Decreased visual acuity;Ocular motor dysfunction   Additional Comments Pt able to correctly identify colors "blue" and "red"  Reports unable to see other colors  Pt reports able to see shapes and figures and correctly identified orange juice and milk carton in front of her  Cognition   Overall Cognitive Status WFL   Arousal/Participation Alert; Responsive; Cooperative   Attention Within functional limits   Orientation Level Oriented X4   Memory Within functional limits   Following Commands Follows one step commands without difficulty   Comments Pt agreeable to OT session    Assessment   Limitation Decreased ADL status; Decreased UE strength;Decreased Safe judgement during ADL;Decreased endurance;Visual deficit; Decreased self-care trans;Decreased high-level ADLs   Prognosis Good   Assessment Patient is a 82 y o  female seen for OT evaluation at [address] [place] [address] on 4/3/2021  s/p Ambulatory dysfunction  Comorbidities impacting functional performance include: COPD, GERD, Compression fx L4, a-fib, CHF, CRF c hypoxia, and DM2  Patient presents with active orders for OT eval and treat and up and OOB as tolerated   Performed at least 2 patient identifiers during session including name and wristband  Patient reports prior level of function as independent  with ADLs, needs assistance with IADLs, ambulatory without AD , and lives alone in a 1 story house  with 3 SAVANNAH  Patient works full time  and does not  drive  Upon initial evaluation, patient requires min assist for UB ADLs, mod assistance for LB ADLs, and min assist x1 for transfers and functional mobility with RW  Based on functional eval, patient presents A&Ox4 with intact  attention, safety awareness, and memory  Occupational performance is affected by the following deficits: endurance , muscular strength , balance , oculomotor function , visual acuity, visual field  and (+) pain   Patient would benefit from OT services to maximize independence in self-care and transfers   Personal factors impacting performance include: decreased caregiver status , SAVANNAH home  and decreased IADL independence  Occupational areas to address include:bathing/showering, toileting, dressing , eating , personal hygiene/grooming , bed mobility , functional mobility, meal preparation, energy conservation  and home management   Recommending return to previous environment with skilled therapy  upon D/C  Goals   Patient Goals "to get stronger so I can walk better"    Plan   Treatment Interventions ADL retraining;Visual perceptual retraining;Functional transfer training; Endurance training;UE strengthening/ROM; Patient/family training;Equipment evaluation/education; Compensatory technique education; Activityengagement; Energy conservation   Goal Expiration Date 04/16/21   OT Treatment Day 0   OT Frequency 3-5x/wk   Recommendation   OT Discharge Recommendation Home with skilled therapy   OT - OK to Discharge Yes  (Once medically clear)   Additional Comments  Pt resting in bed at end of session  Bed alarm activated and call button in reach    Additional Comments 2 The patient's raw score on the AM-PAC Daily Activity inpatient short form is 19, standardized score is 40 22, greater than 39 4  Patients at this level are likely to benefit from discharge to home  Please refer to the recommendation of the Occupational Therapist for safe discharge planning     AM-PAC Daily Activity Inpatient   Lower Body Dressing 3   Bathing 3   Toileting 3   Upper Body Dressing 3   Grooming 3   Eating 4   Daily Activity Raw Score 19   Daily Activity Standardized Score (Calc for Raw Score >=11) 40 22   AM-[facility] Applied Cognition Inpatient   Following a Speech/Presentation 3   Understanding Ordinary Conversation 3   Taking Medications 2   Remembering Where Things Are Placed or Put Away 3   Remembering List of 4-5 Errands 3   Taking Care of Complicated Tasks 3   Applied Cognition Raw Score 17   Applied Cognition Standardized Score 36 52   Barthel Index   Feeding 5   Bathing 0   Grooming Score 5   Dressing Score 5   Bladder Score 10   Bowels Score 10   Toilet Use Score 5   Transfers (Bed/Chair) Score 10   Mobility (Level Surface) Score 0   Stairs Score 0  (DNT)   Barthel Index Score 50     Pt will complete UB ADLs Mod independent  with use of AE as needed for increased ADL independence within 10 days  Pt will complete LB ADLs Mod independent   with use of AE as needed for increased ADL independence within 10 days  Pt will complete toileting Mod independent   with use of DME for increased ADL independence within 10 days  Pt will demonstrate proper body mechanics to complete transfers and functional mobility with Mod independent  and use of AD for increased safety and functional mobility within 10 days  Pt will demonstrate standing tolerance of 5 min with Mod independent  and use of AD for increased endurance and activity tolerance within 10 days  Pt will demonstrate OOB sitting tolerance of 2-4 hr/day for increased activity tolerance and engagement within 10 days  Pt will participate in 10 min of BUE therapeutic exercise to increase strength, ROM, and endurance during ADL/IADLs within 10 days  Pt will receive education on use of compensatory visual strategies for increased safety and independence with IADL tasks  Pt will participate in ongoing cognitive assessments to assist with safe D/C planning and recommendations  Pt will demonstrate proper body mechanics and fall prevention strategies during 100% of tx sessions for increased safety awareness during ADL/IADLs    Pt will verbalize and demonstrate understanding of energy conservation/deep breathing techniques for increased activity tolerance and endurance during meaningful activities       Jagdeep Gamboa, OTS

## 2021-04-06 NOTE — PLAN OF CARE
Problem: OCCUPATIONAL THERAPY ADULT  Goal: Performs self-care activities at highest level of function for planned discharge setting  See evaluation for individualized goals  Description: Treatment Interventions: ADL retraining, Visual perceptual retraining, Functional transfer training, Endurance training, UE strengthening/ROM, Patient/family training, Equipment evaluation/education, Compensatory technique education, Activityengagement, Energy conservation          See flowsheet documentation for full assessment, interventions and recommendations  Note: Limitation: Decreased ADL status, Decreased UE strength, Decreased Safe judgement during ADL, Decreased endurance, Visual deficit, Decreased self-care trans, Decreased high-level ADLs  Prognosis: Good  Assessment: Patient is a 82 y o  female seen for OT evaluation at [address] [place] [address] on 4/3/2021  s/p Ambulatory dysfunction  Comorbidities impacting functional performance include: COPD, GERD, Compression fx L4, a-fib, CHF, CRF c hypoxia, and DM2  Patient presents with active orders for OT eval and treat and up and OOB as tolerated   Performed at least 2 patient identifiers during session including name and wristband  Patient reports prior level of function as independent  with ADLs, needs assistance with IADLs, ambulatory without AD , and lives alone in a 1 story house  with 3 SAVANNAH  Patient works full time  and does not  drive  Upon initial evaluation, patient requires min assist for UB ADLs, mod assistance for LB ADLs, and min assist x1 for transfers and functional mobility with RW  Based on functional eval, patient presents A&Ox4 with intact  attention, safety awareness, and memory  Occupational performance is affected by the following deficits: endurance , muscular strength , balance , oculomotor function , visual acuity, visual field  and (+) pain   Patient would benefit from OT services to maximize independence in self-care and transfers   Personal factors impacting performance include: decreased caregiver status , SAVANNAH home  and decreased IADL independence  Occupational areas to address include:bathing/showering, toileting, dressing , eating , personal hygiene/grooming , bed mobility , functional mobility, meal preparation, energy conservation  and home management   Recommending return to previous environment with skilled therapy  upon D/C        OT Discharge Recommendation: Home with skilled therapy  OT - OK to Discharge: Yes(Once medically clear)     FADI Hewitt

## 2021-04-06 NOTE — CASE MANAGEMENT
Left a VM with pt friend Robert Benites to determine if the friend can transport the pt home upon discharge

## 2021-04-06 NOTE — PHYSICAL THERAPY NOTE
04/06/21 1454   PT Last Visit   PT Visit Date 04/06/21   Note Type   Note Type Treatment   Pain Assessment   Pain Assessment Tool 0-10   Pain Score 7   Pain Location/Orientation Location: Back   Pain Onset/Description Onset: Ongoing   Patient's Stated Pain Goal No pain   Hospital Pain Intervention(s) Ambulation/increased activity;Repositioned   Restrictions/Precautions   Weight Bearing Precautions Per Order No   Other Precautions Chair Alarm; Bed Alarm; Fall Risk;O2;Pain;Visual impairment;Hard of hearing  (2LO2)   General   Chart Reviewed Yes   Response to Previous Treatment Patient with no complaints from previous session  Family/Caregiver Present No   Cognition   Overall Cognitive Status WFL   Arousal/Participation Alert; Cooperative   Attention Within functional limits   Orientation Level Oriented X4   Memory Within functional limits   Following Commands Follows one step commands without difficulty   Subjective   Subjective "My back hurts but I'm gonna do it "   Bed Mobility   Rolling R 6  Modified independent   Additional items Bedrails;HOB elevated; Increased time required   Rolling L 6  Modified independent   Additional items HOB elevated; Bedrails; Increased time required   Supine to Sit 6  Modified independent   Additional items Bedrails;HOB elevated   Sit to Supine 6  Modified independent   Additional items HOB elevated; Bedrails; Increased time required   Transfers   Sit to Stand 5  Supervision   Additional items Assist x 1;Bedrails; Increased time required;Verbal cues   Stand to Sit 5  Supervision   Additional items Assist x 1;HOB elevated; Bedrails; Increased time required;Verbal cues   Stand pivot 5  Supervision   Additional items Assist x 1; Armrests; Increased time required;Verbal cues   Toilet transfer 5  Supervision   Additional items Assist x 1; Armrests; Increased time required;Verbal cues;Standard toilet   Ambulation/Elevation   Gait pattern Improper Weight shift;Decreased foot clearance; Forward Flexion; Short stride   Gait Assistance   (CGA)   Additional items Assist x 1;Verbal cues   Assistive Device Rolling walker   Distance 75 ft   Stair Management Assistance Not tested   Balance   Static Sitting Fair +   Dynamic Sitting Fair   Static Standing Fair -   Dynamic Standing Fair -   Ambulatory Fair -   Endurance Deficit   Endurance Deficit Yes   Activity Tolerance   Activity Tolerance Patient limited by pain; Patient limited by fatigue   Nurse Made Aware RN aware   Assessment   Prognosis Fair   Problem List Decreased strength;Decreased endurance; Impaired balance;Decreased mobility; Impaired hearing; Impaired vision;Decreased safety awareness;Pain   Assessment Pt  found resting in bed and willing to participate in PT treatment  Pt  reported pain level 7/10 on pain scale in her back and stated she had something for pain earlier  Pt  performed bed mobility tasks including rolling from side to side with use of BSR's to assist and MI f/b supine to sit transfer with BSR to assist and MI   Pt  was able to sit at EOB while holding onto the BSR for a brief rest period  Pt then transfered from sit to stand with MI and use of RW and ambulated 75 ft with RW and CGAx1 with cues for direction and assistance with portable O2 tank  Pt  reported need to toilet when re-entering her room and required S for toilet transfers, and was I with clothing management and hygiene needs  Pt  was assisted back to bedside with CGAx1 and RW x 20 ft  Pt  transfered from sit to supine with BSR and MI and was able to self position I'ly in the bed for comfort  Pt  was left with al needs in reach and provided a fresh drink  and bed alarm was activated  Pt  would benefit from HHPT to continue to improve her functional mobility and activity tolerance  Spoke to PT and she agrees with change in recommendation  Continue current POC and progress as tolerated     Goals   Patient Goals to get home    PT Treatment Day 2   Plan Treatment/Interventions Functional transfer training;LE strengthening/ROM; Therapeutic exercise; Endurance training;Patient/family training;Bed mobility;Gait training;OT   Progress Progressing toward goals   PT Frequency   (3-5x/wk)   Recommendation   PT Discharge Recommendation Home with skilled therapy  (Spoke to PT and PT agrees with change in recommendation)   PT - OK to Discharge Yes   Additional Comments when medically stable   AM-PAC Basic Mobility Inpatient   Turning in Bed Without Bedrails 3   Lying on Back to Sitting on Edge of Flat Bed 3   Moving Bed to Chair 3   Standing Up From Chair 3   Walk in Room 3   Climb 3-5 Stairs 3   Basic Mobility Inpatient Raw Score 18   Basic Mobility Standardized Score 41 05   Patricia Lav, PTA

## 2021-04-06 NOTE — PLAN OF CARE
Problem: PAIN - ADULT  Goal: Verbalizes/displays adequate comfort level or baseline comfort level  Description: Interventions:  - Encourage patient to monitor pain and request assistance  - Assess pain using appropriate pain scale  - Administer analgesics based on type and severity of pain and evaluate response  - Implement non-pharmacological measures as appropriate and evaluate response  - Consider cultural and social influences on pain and pain management  - Notify physician/advanced practitioner if interventions unsuccessful or patient reports new pain  Outcome: Progressing     Problem: INFECTION - ADULT  Goal: Absence or prevention of progression during hospitalization  Description: INTERVENTIONS:  - Assess and monitor for signs and symptoms of infection  - Monitor lab/diagnostic results  - Monitor all insertion sites, i e  indwelling lines, tubes, and drains  - Monitor endotracheal if appropriate and nasal secretions for changes in amount and color  - Universal appropriate cooling/warming therapies per order  - Administer medications as ordered  - Instruct and encourage patient and family to use good hand hygiene technique  - Identify and instruct in appropriate isolation precautions for identified infection/condition  Outcome: Progressing  Goal: Absence of fever/infection during neutropenic period  Description: INTERVENTIONS:  - Monitor WBC    Outcome: Progressing     Problem: SAFETY ADULT  Goal: Patient will remain free of falls  Description: INTERVENTIONS:  - Assess patient frequently for physical needs  -  Identify cognitive and physical deficits and behaviors that affect risk of falls    -  Universal fall precautions as indicated by assessment   - Educate patient/family on patient safety including physical limitations  - Instruct patient to call for assistance with activity based on assessment  - Modify environment to reduce risk of injury  - Consider OT/PT consult to assist with strengthening/mobility  Outcome: Progressing  Goal: Maintain or return to baseline ADL function  Description: INTERVENTIONS:  -  Assess patient's ability to carry out ADLs; assess patient's baseline for ADL function and identify physical deficits which impact ability to perform ADLs (bathing, care of mouth/teeth, toileting, grooming, dressing, etc )  - Assess/evaluate cause of self-care deficits   - Assess range of motion  - Assess patient's mobility; develop plan if impaired  - Assess patient's need for assistive devices and provide as appropriate  - Encourage maximum independence but intervene and supervise when necessary  - Involve family in performance of ADLs  - Assess for home care needs following discharge   - Consider OT consult to assist with ADL evaluation and planning for discharge  - Provide patient education as appropriate  Outcome: Progressing  Goal: Maintain or return mobility status to optimal level  Description: INTERVENTIONS:  - Assess patient's baseline mobility status (ambulation, transfers, stairs, etc )    - Identify cognitive and physical deficits and behaviors that affect mobility  - Identify mobility aids required to assist with transfers and/or ambulation (gait belt, sit-to-stand, lift, walker, cane, etc )  - [place] fall precautions as indicated by assessment  - Record patient progress and toleration of activity level on Mobility SBAR; progress patient to next Phase/Stage  - Instruct patient to call for assistance with activity based on assessment  - Consider rehabilitation consult to assist with strengthening/weightbearing, etc   Outcome: Progressing     Problem: DISCHARGE PLANNING  Goal: Discharge to home or other facility with appropriate resources  Description: INTERVENTIONS:  - Identify barriers to discharge w/patient and caregiver  - Arrange for needed discharge resources and transportation as appropriate  - Identify discharge learning needs (meds, wound care, etc )  - Arrange for interpretive services to assist at discharge as needed  - Refer to Case Management Department for coordinating discharge planning if the patient needs post-hospital services based on physician/advanced practitioner order or complex needs related to functional status, cognitive ability, or social support system  Outcome: Progressing     Problem: Knowledge Deficit  Goal: Patient/family/caregiver demonstrates understanding of disease process, treatment plan, medications, and discharge instructions  Description: Complete learning assessment and assess knowledge base    Interventions:  - Provide teaching at level of understanding  - Provide teaching via preferred learning methods  Outcome: Progressing     Problem: RESPIRATORY - ADULT  Goal: Achieves optimal ventilation and oxygenation  Description: INTERVENTIONS:  - Assess for changes in respiratory status  - Assess for changes in mentation and behavior  - Position to facilitate oxygenation and minimize respiratory effort  - Oxygen administered by appropriate delivery if ordered  - Initiate smoking cessation education as indicated  - Encourage broncho-pulmonary hygiene including cough, deep breathe, Incentive Spirometry  - Assess the need for suctioning and aspirate as needed  - Assess and instruct to report SOB or any respiratory difficulty  - Respiratory Therapy support as indicated  Outcome: Progressing     Problem: METABOLIC, FLUID AND ELECTROLYTES - ADULT  Goal: Electrolytes maintained within normal limits  Description: INTERVENTIONS:  - Monitor labs and assess patient for signs and symptoms of electrolyte imbalances  - Administer electrolyte replacement as ordered  - Monitor response to electrolyte replacements, including repeat lab results as appropriate  - Instruct patient on fluid and nutrition as appropriate  Outcome: Progressing  Goal: Fluid balance maintained  Description: INTERVENTIONS:  - Monitor labs   - Monitor I/O and WT  - Instruct patient on fluid and nutrition as appropriate  - Assess for signs & symptoms of volume excess or deficit  Outcome: Progressing  Goal: Glucose maintained within target range  Description: INTERVENTIONS:  - Monitor Blood Glucose as ordered  - Assess for signs and symptoms of hyperglycemia and hypoglycemia  - Administer ordered medications to maintain glucose within target range  - Assess nutritional intake and initiate nutrition service referral as needed  Outcome: Progressing     Problem: SKIN/TISSUE INTEGRITY - ADULT  Goal: Skin integrity remains intact  Description: INTERVENTIONS  - Identify patients at risk for skin breakdown  - Assess and monitor skin integrity  - Assess and monitor nutrition and hydration status  - Monitor labs (i e  albumin)  - Assess for incontinence   - Turn and reposition patient  - Assist with mobility/ambulation  - Relieve pressure over bony prominences  - Avoid friction and shearing  - Provide appropriate hygiene as needed including keeping skin clean and dry  - Evaluate need for skin moisturizer/barrier cream  - Collaborate with interdisciplinary team (i e  Nutrition, Rehabilitation, etc )   - Patient/family teaching  Outcome: Progressing  Goal: Incision(s), wounds(s) or drain site(s) healing without S/S of infection  Description: INTERVENTIONS  - Assess and document risk factors for skin impairment   - Assess and document dressing, incision, wound bed, drain sites and surrounding tissue  - Consider nutrition services referral as needed  - Oral mucous membranes remain intact  - Provide patient/ family education  Outcome: Progressing  Goal: Oral mucous membranes remain intact  Description: INTERVENTIONS  - Assess oral mucosa and hygiene practices  - Implement preventative oral hygiene regimen  - Implement oral medicated treatments as ordered  - Initiate Nutrition services referral as needed  Outcome: Progressing     Problem: MUSCULOSKELETAL - ADULT  Goal: Maintain or return mobility to safest level of function  Description: INTERVENTIONS:  - Assess patient's ability to carry out ADLs; assess patient's baseline for ADL function and identify physical deficits which impact ability to perform ADLs (bathing, care of mouth/teeth, toileting, grooming, dressing, etc )  - Assess/evaluate cause of self-care deficits   - Assess range of motion  - Assess patient's mobility  - Assess patient's need for assistive devices and provide as appropriate  - Encourage maximum independence but intervene and supervise when necessary  - Involve family in performance of ADLs  - Assess for home care needs following discharge   - Consider OT consult to assist with ADL evaluation and planning for discharge  - Provide patient education as appropriate  Outcome: Progressing     Problem: Nutrition/Hydration-ADULT  Goal: Nutrient/Hydration intake appropriate for improving, restoring or maintaining nutritional needs  Description: Monitor and assess patient's nutrition/hydration status for malnutrition  Collaborate with interdisciplinary team and initiate plan and interventions as ordered  Monitor patient's weight and dietary intake as ordered or per policy  Utilize nutrition screening tool and intervene as necessary  Determine patient's food preferences and provide high-protein, high-caloric foods as appropriate       INTERVENTIONS:  - Monitor oral intake, urinary output, labs, and treatment plans  - Assess nutrition and hydration status and recommend course of action  - Evaluate amount of meals eaten  - Assist patient with eating if necessary   - Allow adequate time for meals  - Recommend/ encourage appropriate diets, oral nutritional supplements, and vitamin/mineral supplements  - Order, calculate, and assess calorie counts as needed  - Recommend, monitor, and adjust tube feedings and TPN/PPN based on assessed needs  - Assess need for intravenous fluids  - Provide specific nutrition/hydration education as appropriate  - Include patient/family/caregiver in decisions related to nutrition  Outcome: Progressing

## 2021-04-06 NOTE — PROGRESS NOTES
[facility]  Progress Note - Andreas Bound 1935, 82 y o  female MRN: [id number]  Unit/Bed#: -01 Encounter: [id number]  Primary Care Provider: LEEROY Márquez   Date and time admitted to hospital: 4/3/2021  8:37 AM    * Ambulatory dysfunction  Assessment & Plan  · Presented with back pain and inability to ambulate at home  · Has known chronic compression fractures  · CT scan confirmed this showing chronic lumbar compression fractures  · PT/OT recommend- short-term rehab however the patient refused and would prefer to go home with home care  · improving  · Outpatient pain management referral     Type 2 diabetes mellitus without complication, without long-term current use of insulin ([address] )  Assessment & Plan  · New onset diabetes mellitus blood sugar in 300s  · Hemoglobin A1c 12 4  · Started on Lantus 25 units HS in the hospital- transition to oral regimen  · Discussed case with endocrinology on-call- recommend start on metformin 500 b i d , Januvia 50 mg daily  The patient will need endocrinology follow-up as an outpatient    · Very challenging as the patient has limited vision; will discuss case with endocrine for regimen at home  · Patient is prednisone 5 mg daily for COPD  · ISS with meal coverage       Chronic respiratory failure with hypoxia ([address] )  Assessment & Plan  · Secondary to COPD  · Continue home 2 L nasal cannula  · Currently baseline    Chronic diastolic CHF (congestive heart failure) (HCC)  Assessment & Plan  Wt Readings from Last 3 Encounters:   04/06/21 60 8 kg (134 lb 0 6 oz)   03/02/21 64 3 kg (141 lb 12 8 oz)   02/17/21 65 3 kg (144 lb)     · Continue home beta-blocker and furosemide  · Currently appears euvolemic           Paroxysmal atrial fibrillation (HCC)  Assessment & Plan  · Currently rate controlled  · Continue home Eliquis     Compression fracture of L4 vertebra (HCC)  Assessment & Plan  · Chronic  · CT scan revealed multiple compression fractures of the L-spine  · Supportive measures      COPD (chronic obstructive pulmonary disease) ([facility])  Assessment & Plan  · Not in acute exacerbation  · Continue home inhalers         VTE Prophylaxis:  Apixaban (Eliquis)    Patient Centered Rounds: I have performed bedside rounds with nursing staff today  Discussions with Specialists or Other Care Team Provider:  Nursing, case management, PT/OT and RT  Education and Discussions with Family / Patient:  Patient    Current Length of Stay: 2 day(s)    Current Patient Status: Inpatient   Certification Statement: The patient will continue to require additional inpatient hospital stay due to Ambulatory gait dysfunction    Discharge Plan:  Pending hospital course hopeful within the next 24-48 hours    Code Status: Level 1 - Full Code    Subjective:   Feeling better today  Pain is slightly improving  The patient is happy that her blood glucose is much better control  Objective:     Vitals:   Temp (24hrs), Av 4 °F (36 3 °C), Min:97 1 °F (36 2 °C), Max:97 9 °F (36 6 °C)    Temp:  [97 1 °F (36 2 °C)-97 9 °F (36 6 °C)] 97 4 °F (36 3 °C)  HR:  [59-74] 59  Resp:  [17-18] 17  BP: ()/(56-66) 92/59  SpO2:  [94 %-99 %] 96 %  Body mass index is 26 18 kg/m²  Input and Output Summary (last 24 hours): Intake/Output Summary (Last 24 hours) at 2021 1555  Last data filed at 2021 1224  Gross per 24 hour   Intake 680 ml   Output --   Net 680 ml       Physical Exam:   Physical Exam  Vitals signs and nursing note reviewed  Constitutional:       General: She is not in acute distress  Appearance: Normal appearance  HENT:      Head: Normocephalic and atraumatic  Right Ear: External ear normal       Left Ear: External ear normal       Nose: Nose normal  No rhinorrhea  Mouth/Throat:      Mouth: Mucous membranes are moist       Pharynx: Oropharynx is clear  Eyes:      General:         Right eye: No discharge           Left eye: No discharge  Pupils: Pupils are equal, round, and reactive to light  Neck:      Musculoskeletal: Normal range of motion and neck supple  No muscular tenderness  Cardiovascular:      Rate and Rhythm: Normal rate and regular rhythm  Pulses: Normal pulses  Heart sounds: Normal heart sounds  No murmur  Pulmonary:      Effort: Pulmonary effort is normal  No respiratory distress  Breath sounds: Wheezing present  Abdominal:      General: Bowel sounds are normal  There is no distension  Palpations: Abdomen is soft  There is no mass  Tenderness: There is no abdominal tenderness  Musculoskeletal: Normal range of motion  General: No swelling or tenderness  Skin:     General: Skin is warm and dry  Capillary Refill: Capillary refill takes less than 2 seconds  Findings: No erythema or rash  Neurological:      General: No focal deficit present  Mental Status: She is alert and oriented to person, place, and time  Mental status is at baseline  Psychiatric:         Mood and Affect: Mood normal          Behavior: Behavior normal          Thought Content: Thought content normal          Judgment: Judgment normal          Additional Data:     Labs:    Results from last 7 days   Lab Units 04/06/21  0503  04/03/21  1250   WBC Thousand/uL 6 20   < > 9 10   HEMOGLOBIN g/dL 10 6*   < > 11 5*   HEMATOCRIT % 34 1*   < > 37 6*   PLATELETS Thousands/uL 193   < > 258   LYMPHO PCT %  --   --  9*   MONO PCT %  --   --  5    < > = values in this interval not displayed  Results from last 7 days   Lab Units 04/06/21  0503  04/03/21  1250   SODIUM mmol/L 134   < > 134   POTASSIUM mmol/L 2 9*   < > 3 3*   CHLORIDE mmol/L 93*   < > 93*   CO2 mmol/L 32*   < > 30   BUN mg/dL 9   < > 13   CREATININE mg/dL 0 53*   < > 0 63   CALCIUM mg/dL 7 7*   < > 7 9*   ALK PHOS U/L  --   --  44*   ALT U/L  --   --  24   AST U/L  --   --  11*    < > = values in this interval not displayed  Results from last 7 days   Lab Units 04/06/21  1059 04/06/21  0640 04/05/21 2007 04/05/21  1541 04/05/21  1058 04/05/21  0521 04/04/21 2008 04/04/21  1544 04/04/21  1041 04/04/21  0530 04/03/21  2054 04/03/21  1614   POC GLUCOSE mg/dl 196* 186* 294* 287* 241* 278* 307* 338* 261* 245* 321* 265*     Results from last 7 days   Lab Units 04/03/21  1852   HEMOGLOBIN A1C % 12 4*       * I Have Reviewed All Lab Data Listed Above  * Additional Pertinent Lab Tests Reviewed:  [address] Admission  Reviewed    Imaging:  Imaging Reports Reviewed Today Include: n/a     Recent Cultures (last 7 days):           Last 24 Hours Medication List:   Current Facility-Administered Medications   Medication Dose Route Frequency Provider Last Rate    acetaminophen  650 mg Oral Q6H PRN Sameer Bethea MD      albuterol  2 puff Inhalation Q4H PRN Sameer Bethea MD      apixaban  5 mg Oral BID Sameer Bethea MD      artificial tear   Both Eyes HS PRN Sameer Bethea MD      budesonide-formoterol  2 puff Inhalation BID Sameer Bethea MD      furosemide  40 mg Oral BID Sameer Bethea MD      gabapentin  300 mg Oral TID Sameer Bethea MD      HYDROmorphone  0 2 mg Intravenous Q4H PRN Sameer Bethea MD      insulin lispro  1-5 Units Subcutaneous HS Sameer Bethea MD      insulin lispro  1-6 Units Subcutaneous TID AC Sameer Bethea MD      insulin lispro  3 Units Subcutaneous TID With Meals MiniTimeLEEROY      ipratropium  0 5 mg Nebulization TID Sameer Bethea MD      levalbuterol  1 25 mg Nebulization TID Sameer Bethea MD      lidocaine  1 patch Topical Daily Sameer Bethea MD      menthol-methyl salicylate   Apply externally 4x Daily PRN Sameer Bethea MD      metFORMIN  500 mg Oral BID With Meals MiniTimeLEEROY      methocarbamol  500 mg Oral [facility] MiniTimeLEEROY      metoprolol tartrate  12 5 mg Oral Q12H Sameer Bethea MD      montelukast  10 mg Oral HS Eli Patten MD     [facility] oxyCODONE-acetaminophen  1 tablet Oral Q6H PRN Sameer Bethea MD      pantoprazole  40 mg Oral BID Sameer Bethea MD      [START ON 4/7/2021] potassium chloride  10 mEq Oral BID LEEROY Calvert      potassium chloride  40 mEq Oral BID LEEROY Calvert      predniSONE  5 mg Oral BID With Meals Toy Best MD      sitaGLIPtin  50 mg Oral Daily LEEROY Calvert      theophylline  200 mg Oral Daily Toy Best MD          Today, Patient Was Seen By: LEEROY Calvert    ** Please Note: Dictation voice to text software may have been used in the creation of this document   **

## 2021-04-07 NOTE — CASE MANAGEMENT
PT has been evaluated by [facility] and is stable to be discharged  Spoke to Rio Bourgeois who states she can transport the pt home at 10:30  TC to [facility] to deliver a portable tank  Sent in basket message to ALEX Mcgowan for a MARIBEL appt  Sent inbasket  message to outnury Garzon to follow the pt  Sent an e-mail to kaz to get pt assistance in the home  Pt will start services with [facility]

## 2021-04-07 NOTE — ASSESSMENT & PLAN NOTE
· Chronic  · CT scan revealed multiple compression fractures of the L-spine  · Supportive measures     · Pain management outpatient

## 2021-04-07 NOTE — NURSING NOTE
Patient discharged in stable condition  Discharge instruction, F/U appointments and medications reviewed with patient  Questions addressed  My chart reviewed

## 2021-04-07 NOTE — DISCHARGE INSTR - AVS FIRST PAGE
Dear Nneka Whitehead,     It was our pleasure to care for you here at [facility]  It is our hope that we were always able to exceed the expected standards for your care during your stay  You were hospitalized due to back pain  You were cared for on the 1st floor by LEEROY Silva with the [address] [place] [address] [facility] who covers for your primary care physician (PCP), Dionisio Roche, while you were hospitalized  If you have any questions or concerns related to this hospitalization, you may contact us at [phone]  For follow up as well as any medication refills, we recommend that you follow up with your primary care physician  A registered nurse will reach out to you by phone within a few days after your discharge to answer any additional questions that you may have after going home  However, at this time we provide for you here, the most important instructions / recommendations at discharge:     · Notable Medication Adjustments -   · Metformin 500 mg twice daily-diabetes  · Januvia 50 mg once daily-diabetes  · Methocarbamol 500 mg q 8 hours-muscle relaxant  · Gabapentin is increased from 300 mg twice daily to 3 times daily  · Testing Required after Discharge -   · None   · Important follow up information -   · Follow-up PCP  · Follow-up pain management  · Other Instructions -   · Please make sure that you check your blood glucose 3 times daily before meals  · If you feel dizzy, lightheaded, nausea, vomiting or diarrhea please check your blood glucose right away  Please make sure that you contact PCP if this happens    · You will need to see pain management soon for possible steroid injection on your back     · Please review this entire after visit summary as additional general instructions including medication list, appointments, activity, diet, any pertinent wound care, and other additional recommendations from your care team that may be provided for you        Sincerely,     LEEROY Villalta

## 2021-04-07 NOTE — ASSESSMENT & PLAN NOTE
Wt Readings from Last 3 Encounters:   04/07/21 61 kg (134 lb 7 7 oz)   03/02/21 64 3 kg (141 lb 12 8 oz)   02/17/21 65 3 kg (144 lb)     · Continue home beta-blocker and furosemide  · Currently appears euvolemic

## 2021-04-07 NOTE — DISCHARGE SUMMARY
[facility]  Discharge- Skippsander Caper 1935, 82 y o  female MRN: [id number]  Unit/Bed#: -01 Encounter: [id number]  Primary Care Provider: LEEROY Hutchinson   Date and time admitted to hospital: 4/3/2021  8:37 AM    * Ambulatory dysfunction  Assessment & Plan  · Presented with back pain and inability to ambulate at home  · Has known chronic compression fractures  · CT scan confirmed this showing chronic lumbar compression fractures  · PT/OT recommend- short-term rehab however the patient refused and would prefer to go home with home care  · improving  · Outpatient pain management referral      Type 2 diabetes mellitus without complication, without long-term current use of insulin ([facility] )  Assessment & Plan  · New onset diabetes mellitus blood sugar in 300s  · Hemoglobin A1c 12 4  · Started on Lantus 25 units HS in the hospital- transition to oral regimen  · Discussed case with endocrinology on-call- recommend start on metformin 500 b i d , Januvia 50 mg daily  The patient will need endocrinology follow-up as an outpatient    · Very challenging as the patient has limited vision- discussed with PCP via tigertext for close follow up   · Patient is prednisone 5 mg daily for COPD         Chronic respiratory failure with hypoxia ([address] )  Assessment & Plan  · Secondary to COPD  · Continue home 2 L nasal cannula  · Currently baseline     Chronic diastolic CHF (congestive heart failure) (HCC)  Assessment & Plan  Wt Readings from Last 3 Encounters:   04/07/21 61 kg (134 lb 7 7 oz)   03/02/21 64 3 kg (141 lb 12 8 oz)   02/17/21 65 3 kg (144 lb)     · Continue home beta-blocker and furosemide  · Currently appears euvolemic            Paroxysmal atrial fibrillation (HCC)  Assessment & Plan  · Currently rate controlled  · Continue home Eliquis      Compression fracture of L4 vertebra (HCC)  Assessment & Plan  · Chronic  · CT scan revealed multiple compression fractures of the L-spine  · Supportive measures     · Pain management outpatient     COPD (chronic obstructive pulmonary disease) ([facility] )  Assessment & Plan  · Not in acute exacerbation  · Continue home inhalers          Discharging Physician / Practitioner: LEEROY Murguia  PCP: Raúl Post  Admission Date:   Admission Orders (From admission, onward)     Ordered        04/04/21 1850  Inpatient Admission  Once         04/03/21 1456  Place in Observation  Once                   Discharge Date: 04/07/21    Resolved Problems  Date Reviewed: 4/7/2021    None          Consultations During Hospital Stay:  · None     Procedures Performed:   · None     Significant Findings / Test Results:   · Xray lumbar Multiple lumbar compression fractures appear grossly similar in extent to prior CT 2/24/2020  · cxr Cardiomegaly and pulmonary vascular congestion  Incidental Findings:   · None      Test Results Pending at Discharge (will require follow up): · None      Outpatient Tests Requested:  · Follow-up PCP  · Follow-up pain management  · Follow-up with endocrinology/DM education   · Referral to palliative care service     Complications:     none    Reason for Admission: back pain     Hospital Course:     Casandra Callahan is a 82 y o  female patient who originally presented to the hospital on 4/3/2021 due to back pain and inability to walk  Please refer to H&P for initial presenting complaint  Brief the patient with history of multiple lumbar compression fractures presented with inability to walk and increasing back pain and subsequently was admitted  She was evaluated by PT and OT and initially was recommended for short-term rehab at a skilled nursing facility however the patient refused the option to go to a nursing home  She was evaluated by PT/OT again and at this time is safe for discharge home with home care  Her gabapentin was increased from 300 mg BID to TID  Robaxin was added   She will need to see pain management outpatient- saw Dr German Calabrese in the past with "got shots in my pain before"  She has elevated A1C with average blood glucose in 300s  Initially started on lantus and insulin coverage  Due to her limited vision and hearing, patient was transitioned to PO metformin 500 mg b i d  And Januvia 50 mg daily  I discussed case with endocrinology on-call  Patient has an outpatient referral to endocrinology and diabetic Education  Overall clinically is improved  Pain persists however able to ambulate much better compared to admission  The patient will be discharged home with home care today  I discussed this at length with the patient and her friend-Keli  Please see above list of diagnoses and related plan for additional information  Condition at Discharge: good     Discharge Day Visit / Exam:     Subjective:  Feeling okay  Continues to have pain on her back  Vitals: Blood Pressure: 102/65 (04/07/21 0818)  Pulse: 65 (04/07/21 0818)  Temperature: 97 9 °F (36 6 °C) (04/07/21 0818)  Temp Source: Tympanic (04/07/21 0818)  Respirations: 18 (04/07/21 0818)  Height: 5' (152 4 cm) (04/03/21 1527)  Weight - Scale: 61 kg (134 lb 7 7 oz) (04/07/21 0539)  SpO2: 92 % (04/07/21 0818)  Exam:   Physical Exam  Vitals signs and nursing note reviewed  Constitutional:       General: She is not in acute distress  Appearance: Normal appearance  HENT:      Head: Normocephalic and atraumatic  Right Ear: External ear normal       Left Ear: External ear normal       Nose: Nose normal  No rhinorrhea  Mouth/Throat:      Mouth: Mucous membranes are moist       Pharynx: Oropharynx is clear  Eyes:      General:         Right eye: No discharge  Left eye: No discharge  Pupils: Pupils are equal, round, and reactive to light  Neck:      Musculoskeletal: Normal range of motion and neck supple  No muscular tenderness  Cardiovascular:      Rate and Rhythm: Normal rate and regular rhythm        Pulses: Normal pulses  Heart sounds: Normal heart sounds  No murmur  Pulmonary:      Effort: Pulmonary effort is normal  No respiratory distress  Comments: Decreased    Abdominal:      General: Bowel sounds are normal  There is no distension  Palpations: Abdomen is soft  There is no mass  Tenderness: There is no abdominal tenderness  Musculoskeletal:         General: No swelling or tenderness  Lumbar back: She exhibits decreased range of motion and bony tenderness  She exhibits no swelling, no edema and no deformity  Skin:     General: Skin is warm and dry  Capillary Refill: Capillary refill takes less than 2 seconds  Findings: No erythema or rash  Neurological:      General: No focal deficit present  Mental Status: She is alert and oriented to person, place, and time  Mental status is at baseline  Psychiatric:         Mood and Affect: Mood normal          Behavior: Behavior normal          Thought Content: Thought content normal          Discussion with Family: Patrick dillon    Discharge instructions/Information to patient and family:   See after visit summary for information provided to patient and family  Provisions for Follow-Up Care:  See after visit summary for information related to follow-up care and any pertinent home health orders  Disposition:     Home with VNA Services (Reminder: Complete face to face encounter)      Planned Readmission:    No      Discharge Statement:  I spent 38 minutes discharging the patient  This time was spent on the day of discharge  I had direct contact with the patient on the day of discharge  Greater than 50% of the total time was spent examining patient, answering all patient questions, arranging and discussing plan of care with patient as well as directly providing post-discharge instructions  Additional time then spent on discharge activities      Discharge Medications:  See after visit summary for reconciled discharge medications provided to patient and family        ** Please Note: This note has been constructed using a voice recognition system **

## 2021-04-07 NOTE — TELEPHONE ENCOUNTER
Needs tcm note, [facility] , adm  4-1-21  Dc 4-7-21, dx backpain ,vianey, appt with Karla Maki on 4-9-21

## 2021-04-07 NOTE — ASSESSMENT & PLAN NOTE
· New onset diabetes mellitus blood sugar in 300s  · Hemoglobin A1c 12 4  · Started on Lantus 25 units HS in the hospital- transition to oral regimen  · Discussed case with endocrinology on-call- recommend start on metformin 500 b i d , Januvia 50 mg daily  The patient will need endocrinology follow-up as an outpatient    · Very challenging as the patient has limited vision- discussed with PCP via tigertext for close follow up   · Patient is prednisone 5 mg daily for COPD

## 2021-04-07 NOTE — DISCHARGE INSTR - APPOINTMENTS
Made a referral to [address] [place] [address] [phone]  To notify pt to do an application for the waiver program to get help in the home  L Calc + [place] fxs

## 2021-04-07 NOTE — PLAN OF CARE
Problem: PAIN - ADULT  Goal: Verbalizes/displays adequate comfort level or baseline comfort level  Description: Interventions:  - Encourage patient to monitor pain and request assistance  - Assess pain using appropriate pain scale  - Administer analgesics based on type and severity of pain and evaluate response  - Implement non-pharmacological measures as appropriate and evaluate response  - Consider cultural and social influences on pain and pain management  - Notify physician/advanced practitioner if interventions unsuccessful or patient reports new pain  Outcome: Adequate for Discharge     Problem: INFECTION - ADULT  Goal: Absence or prevention of progression during hospitalization  Description: INTERVENTIONS:  - Assess and monitor for signs and symptoms of infection  - Monitor lab/diagnostic results  - Monitor all insertion sites, i e  indwelling lines, tubes, and drains  - Monitor endotracheal if appropriate and nasal secretions for changes in amount and color  - [place] appropriate cooling/warming therapies per order  - Administer medications as ordered  - Instruct and encourage patient and family to use good hand hygiene technique  - Identify and instruct in appropriate isolation precautions for identified infection/condition  Outcome: Adequate for Discharge  Goal: Absence of fever/infection during neutropenic period  Description: INTERVENTIONS:  - Monitor WBC    Outcome: Adequate for Discharge     Problem: SAFETY ADULT  Goal: Patient will remain free of falls  Description: INTERVENTIONS:  - Assess patient frequently for physical needs  -  Identify cognitive and physical deficits and behaviors that affect risk of falls    -  [place] fall precautions as indicated by assessment   - Educate patient/family on patient safety including physical limitations  - Instruct patient to call for assistance with activity based on assessment  - Modify environment to reduce risk of injury  - Consider OT/PT consult to assist with strengthening/mobility  Outcome: Adequate for Discharge  Goal: Maintain or return to baseline ADL function  Description: INTERVENTIONS:  -  Assess patient's ability to carry out ADLs; assess patient's baseline for ADL function and identify physical deficits which impact ability to perform ADLs (bathing, care of mouth/teeth, toileting, grooming, dressing, etc )  - Assess/evaluate cause of self-care deficits   - Assess range of motion  - Assess patient's mobility; develop plan if impaired  - Assess patient's need for assistive devices and provide as appropriate  - Encourage maximum independence but intervene and supervise when necessary  - Involve family in performance of ADLs  - Assess for home care needs following discharge   - Consider OT consult to assist with ADL evaluation and planning for discharge  - Provide patient education as appropriate  Outcome: Adequate for Discharge  Goal: Maintain or return mobility status to optimal level  Description: INTERVENTIONS:  - Assess patient's baseline mobility status (ambulation, transfers, stairs, etc )    - Identify cognitive and physical deficits and behaviors that affect mobility  - Identify mobility aids required to assist with transfers and/or ambulation (gait belt, sit-to-stand, lift, walker, cane, etc )  - [place] fall precautions as indicated by assessment  - Record patient progress and toleration of activity level on Mobility SBAR; progress patient to next Phase/Stage  - Instruct patient to call for assistance with activity based on assessment  - Consider rehabilitation consult to assist with strengthening/weightbearing, etc   Outcome: Adequate for Discharge     Problem: DISCHARGE PLANNING  Goal: Discharge to home or other facility with appropriate resources  Description: INTERVENTIONS:  - Identify barriers to discharge w/patient and caregiver  - Arrange for needed discharge resources and transportation as appropriate  - Identify discharge learning needs (meds, wound care, etc )  - Arrange for interpretive services to assist at discharge as needed  - Refer to Case Management Department for coordinating discharge planning if the patient needs post-hospital services based on physician/advanced practitioner order or complex needs related to functional status, cognitive ability, or social support system  Outcome: Adequate for Discharge     Problem: Knowledge Deficit  Goal: Patient/family/caregiver demonstrates understanding of disease process, treatment plan, medications, and discharge instructions  Description: Complete learning assessment and assess knowledge base    Interventions:  - Provide teaching at level of understanding  - Provide teaching via preferred learning methods  Outcome: Adequate for Discharge     Problem: RESPIRATORY - ADULT  Goal: Achieves optimal ventilation and oxygenation  Description: INTERVENTIONS:  - Assess for changes in respiratory status  - Assess for changes in mentation and behavior  - Position to facilitate oxygenation and minimize respiratory effort  - Oxygen administered by appropriate delivery if ordered  - Initiate smoking cessation education as indicated  - Encourage broncho-pulmonary hygiene including cough, deep breathe, Incentive Spirometry  - Assess the need for suctioning and aspirate as needed  - Assess and instruct to report SOB or any respiratory difficulty  - Respiratory Therapy support as indicated  Outcome: Adequate for Discharge     Problem: METABOLIC, FLUID AND ELECTROLYTES - ADULT  Goal: Electrolytes maintained within normal limits  Description: INTERVENTIONS:  - Monitor labs and assess patient for signs and symptoms of electrolyte imbalances  - Administer electrolyte replacement as ordered  - Monitor response to electrolyte replacements, including repeat lab results as appropriate  - Instruct patient on fluid and nutrition as appropriate  Outcome: Adequate for Discharge  Goal: Fluid balance maintained  Description: INTERVENTIONS:  - Monitor labs   - Monitor I/O and WT  - Instruct patient on fluid and nutrition as appropriate  - Assess for signs & symptoms of volume excess or deficit  Outcome: Adequate for Discharge  Goal: Glucose maintained within target range  Description: INTERVENTIONS:  - Monitor Blood Glucose as ordered  - Assess for signs and symptoms of hyperglycemia and hypoglycemia  - Administer ordered medications to maintain glucose within target range  - Assess nutritional intake and initiate nutrition service referral as needed  Outcome: Adequate for Discharge     Problem: SKIN/TISSUE INTEGRITY - ADULT  Goal: Skin integrity remains intact  Description: INTERVENTIONS  - Identify patients at risk for skin breakdown  - Assess and monitor skin integrity  - Assess and monitor nutrition and hydration status  - Monitor labs (i e  albumin)  - Assess for incontinence   - Turn and reposition patient  - Assist with mobility/ambulation  - Relieve pressure over bony prominences  - Avoid friction and shearing  - Provide appropriate hygiene as needed including keeping skin clean and dry  - Evaluate need for skin moisturizer/barrier cream  - Collaborate with interdisciplinary team (i e  Nutrition, Rehabilitation, etc )   - Patient/family teaching  Outcome: Adequate for Discharge  Goal: Incision(s), wounds(s) or drain site(s) healing without S/S of infection  Description: INTERVENTIONS  - Assess and document risk factors for skin impairment   - Assess and document dressing, incision, wound bed, drain sites and surrounding tissue  - Consider nutrition services referral as needed  - Oral mucous membranes remain intact  - Provide patient/ family education  Outcome: Adequate for Discharge  Goal: Oral mucous membranes remain intact  Description: INTERVENTIONS  - Assess oral mucosa and hygiene practices  - Implement preventative oral hygiene regimen  - Implement oral medicated treatments as ordered  - Initiate Nutrition services referral as needed  Outcome: Adequate for Discharge     Problem: MUSCULOSKELETAL - ADULT  Goal: Maintain or return mobility to safest level of function  Description: INTERVENTIONS:  - Assess patient's ability to carry out ADLs; assess patient's baseline for ADL function and identify physical deficits which impact ability to perform ADLs (bathing, care of mouth/teeth, toileting, grooming, dressing, etc )  - Assess/evaluate cause of self-care deficits   - Assess range of motion  - Assess patient's mobility  - Assess patient's need for assistive devices and provide as appropriate  - Encourage maximum independence but intervene and supervise when necessary  - Involve family in performance of ADLs  - Assess for home care needs following discharge   - Consider OT consult to assist with ADL evaluation and planning for discharge  - Provide patient education as appropriate  Outcome: Adequate for Discharge     Problem: Nutrition/Hydration-ADULT  Goal: Nutrient/Hydration intake appropriate for improving, restoring or maintaining nutritional needs  Description: Monitor and assess patient's nutrition/hydration status for malnutrition  Collaborate with interdisciplinary team and initiate plan and interventions as ordered  Monitor patient's weight and dietary intake as ordered or per policy  Utilize nutrition screening tool and intervene as necessary  Determine patient's food preferences and provide high-protein, high-caloric foods as appropriate       INTERVENTIONS:  - Monitor oral intake, urinary output, labs, and treatment plans  - Assess nutrition and hydration status and recommend course of action  - Evaluate amount of meals eaten  - Assist patient with eating if necessary   - Allow adequate time for meals  - Recommend/ encourage appropriate diets, oral nutritional supplements, and vitamin/mineral supplements  - Order, calculate, and assess calorie counts as needed  - Recommend, monitor, and adjust tube feedings and TPN/PPN based on assessed needs  - Assess need for intravenous fluids  - Provide specific nutrition/hydration education as appropriate  - Include patient/family/caregiver in decisions related to nutrition  Outcome: Adequate for Discharge

## 2021-04-08 NOTE — PROGRESS NOTES
OP CM rcvd consult for pt needing assistance at home  Referral was already made to [facility] by inpt   Pt states she uses [facility] for 02  Pt was also referred to [facility] this week for RN and PT/OT  Pt lives alone in a 1 story home with 4 steps to enter and 1st floor set up  Pt states that her neighbor is coming tonight to make her dinner  Pt has someone who helps her clean, someone who helps with transportation and to take her shopping  Pt states she is indep with ADLs  Pt has a walker but does not use  Pt also has a nebulizer and shower bench  Pt states she walks slow and takes it easy  Pt also [place] [address] [employer] [address] for transportation as well  Pt is aware waiver services can take months and the first step is a medicaid application  Explained the process and she states that she may not even pursue it  Explained that when [facility] calls she can discuss the process with them  Called to [facility] intake [phone] and was advised that someone will be reaching out to pt today and confirmed that pt will have RN, PT and OT  Pt given OP CM contact info  Will remain available

## 2021-04-09 PROBLEM — Z92.89 HISTORY OF RECENT HOSPITALIZATION: Status: ACTIVE | Noted: 2021-01-01

## 2021-04-09 NOTE — ASSESSMENT & PLAN NOTE
· Patient with chronic COPD  · Continues on her Home 3 L NC  · While in the office, her Portable Oxygen Machine turned off - both patient and friend stated that machine was 100% charged when they left the house to come to the appointment    · Patient was placed on Office oxygen tank at 3 L   · Patient stable to taken off office tank and go to next appointment

## 2021-04-09 NOTE — ASSESSMENT & PLAN NOTE
· Chronic low back issues - see Lumbar Radiculopathy Section  · Supportive measures  · Follow up on 4/9/2021 with pain mangement  · Continue fosamax, calcium +Vitamin D, Robaxin  · Administer Toradol 30mg IM injection for severe back pain rated 10/10  · Will order her Norco for at home, as Percocet not working for her  · Patient advised and educated, as well as verbally repeated that she is not to take both percocet and norco

## 2021-04-09 NOTE — ASSESSMENT & PLAN NOTE
· Patient now on chronic 3 L NC  · Continue prednisone 5mg PO BID, symbicort, trelegy, duoneb, singulair and theophylline

## 2021-04-09 NOTE — ASSESSMENT & PLAN NOTE
· Chronic  · CT scan revealed multiple compression fractures of the L-spine  · Supportive measures     · Pain management outpatient - appointment on 4/9/2021  · Continue fosamax, Calcium+Vitamin D, Robaxin  · Will give patient 30mg IM injection of toradol during this visit for her severe back pain - 10/10  · Will also order her norco to take at home for pain instead of the percocet

## 2021-04-09 NOTE — ASSESSMENT & PLAN NOTE
Lab Results   Component Value Date    HGBA1C 12 4 (H) 04/03/2021     · New onset with blood sugars in the 300's during most recent hospitalization  · She was initially started on lantus 25 units at HS, but transitioned to oral agents due to her vision issues  · Pt was discussed with endocrinologist by hospitalist  · Will continue metformin 500mg po BID and Januvia 50mg PO Daily  · Endocrinology referral ordered  · Nutrition referral ordered  · Glucometer, Test strips, lancets ordered for the patient  · Neurontin increased to 300mg PO TID from BID  · Patient continues with prednisone 5mg po BID for her COPD  · Attempted to provide education to patient - was met with significant resistance

## 2021-04-09 NOTE — TELEPHONE ENCOUNTER
Pt called in speak with a nurse  In regard to her medication  Please be advise thank you        Please call patient back @ [phone]

## 2021-04-09 NOTE — PROGRESS NOTES
Assessment:  1  Chronic pain syndrome    2  Closed wedge compression fracture of T12 vertebra, sequela    3  Compression fracture of L4 vertebra, sequela    4  Sacroiliitis ([address] )        Plan:   While the patient was in the office today, I did have a thorough conversation regarding their chronic pain syndrome, medication management, and treatment plan options  Patient is an 17-year-old female with chronic pain syndrome related to chronic low back pain, history of thoracic and lumbar compression fractures, sacroiliitis  She was last seen here on 02/17/2021 following sacroiliac joint injections which provided her with up to 50% relief  She states that her back pain has increased again 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   Pain was recently so bad that she went to the emergency room  She ended up being admitted for pain and ambulatory dysfunction  She was also diagnosed with diabetes  Her hemoglobin A1c was 12 4  My initial plan was to schedule repeat sacroiliac joint injections, however patient is not a candidate for steroid injections since her sugars are not controlled  I discussed with patient that she is not a candidate for any type of steroid until her blood sugars have normalized  Patient was sent home with a prescription for Januvia  She is not sure if she is taking it  Patient was discharged from the hospital 2 days ago  Upon review of her discharge summary it was recommended that she be transferred to the rehabilitation unit  Patient declined stating that she would do for physical therapy at home  Today, patient tells me that she is having a harder time getting around now than she was before the admission to the hospital   At this point, I will reach out to the PCP and make them aware of her situation  I am not sure how safe she will be at home  patient was seen by her PCP today who provided her with a prescription for hydrocodone 5/325    Up to this point, we have been prescribing oxycodone for this patient  I advised her that she cannot take oxycodone and hydrocodone together as it could contribute to respiratory depression/death  Patient was fairly argumentative throughout her entire visit here  She states that at this point her PCP will take over prescriptions that manage her pain  I will make a notation in the chart that we will no longer be providing patient with opiate medications  She was discharged from the hospital with a prescription for Robaxin 500 mg every 8 hours as needed for spasms  She is not sure if she is taking it  She was also sent home with a prescription for gabapentin 300 mg 3 times daily  Again, she is not sure if she is taking it  History of Present Illness: The patient is a 82 y o  female last seen on   02/17/2021 who presents for a follow up office visit in regards to chronic pain secondary to  Chronic pain syndrome related to chronic low back pain, multiple thoracic and lumbar compression fractures  The patient currently reports  Increased back pain following a recent bump the school bus ride  She was recently hospitalized  She was discharged 2 days ago      I have personally reviewed and/or updated the patient's past medical history, past surgical history, family history, social history, current medications, allergies, and vital signs today  Review of Systems:    Review of Systems   Constitutional: Negative for fatigue  HENT: Negative for ear pain and hearing loss  Eyes: Negative for redness  Respiratory: Positive for shortness of breath  Negative for cough  Cardiovascular: Negative for leg swelling  Gastrointestinal: Negative for anal bleeding and rectal pain  Endocrine: Negative for polydipsia  Genitourinary: Negative for hematuria  Musculoskeletal: Positive for back pain and gait problem  Negative for joint swelling  Skin: Negative for rash     Neurological: Positive for weakness  Negative for headaches  Hematological: Does not bruise/bleed easily  Psychiatric/Behavioral: Negative for behavioral problems and hallucinations  Past Medical History:   Diagnosis Date    Allergies     Asthma     Chronic diastolic CHF (congestive heart failure) (HCC)     Colitis     Colon polyp     COPD (chronic obstructive pulmonary disease)     Diverticulosis     DJD (degenerative joint disease)     Gait abnormality     Hypertension     Paroxysmal atrial fibrillation (HCC)        Past Surgical History:   Procedure Laterality Date    BLADDER SURGERY      COLON SURGERY      COLONOSCOPY      COLONOSCOPY W/ POLYPECTOMY N/A 2019    Procedure: COLONOSCOPY W/ POLYPECTOMY;  Surgeon: Mamadou Fermin MD;  Location: [facility]; Service: General    FL GUIDED NEEDLE PLAC BX/ASP/INJ  2020    [place] GUIDED NEEDLE PLAC BX/ASP/INJ  2021    GASTROJEJUNOSTOMY W/ JEJUNOSTOMY TUBE N/A 2/3/2020    Procedure: Antrectomy with bilroth II Anastomosis;   Surgeon: Dianna Goldstein MD;  Location: [facility];  Service: General   Brigida See JOINT Right 2020    Procedure: BLOCK / INJECTION SACROILIAC;  Surgeon: Jose Tinajero MD;  Location: [facility];  Service: Pain Management     CT INJECTION,SACROILIAC JOINT Right 2021    Procedure: RIGHT SACROILIAC JOINT INJECTION;  Surgeon: Jose Tinajero MD;  Location: [facility];  Service: Pain Management     SMALL INTESTINE SURGERY  2020    with partial gastrectomy       Family History   Problem Relation Age of Onset    Heart disease Mother        Social History     Occupational History    Occupation: retired    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   Tobacco Use    Smoking status: Former Smoker     Quit date: 2013     Years since quittin 3    Smokeless tobacco: Never Used   Substance and Sexual Activity    Alcohol use: Never     Alcohol/week: 0 0 standard drinks     Frequency: Never     Drinks per session: Patient refused     Binge frequency: Never    Drug use: Never    Sexual activity: Not Currently         Current Outpatient Medications:     alendronate (FOSAMAX) 70 mg tablet, take 1 tablet by mouth every 7 days, Disp: 4 tablet, Rfl: 5    apixaban (ELIQUIS) 5 mg, Take 1 tablet (5 mg total) by mouth 2 (two) times a day, Disp: 60 tablet, Rfl: 5    Artificial Tear Solution (GENTEAL TEARS) 0 1-0 2-0 3 % SOLN, Administer 1 drop to both eyes 3 (three) times a day, Disp: , Rfl:     budesonide-formoterol (Symbicort) 160-4 5 mcg/act inhaler, Inhale 2 puffs 2 (two) times a day, Disp: , Rfl:     Calcium Carb-Cholecalciferol (CALCIUM 1000 + D PO), Take 1,000 mg by mouth daily, Disp: , Rfl:     fluticasone-umeclidinium-vilanterol (Trelegy Ellipta) 100-62 5-25 MCG/INH inhaler, Inhale 1 puff daily Rinse mouth after use , Disp: 3 Inhaler, Rfl: 1    furosemide (LASIX) 20 mg tablet, take 2 tablets by mouth twice a day, Disp: 60 tablet, Rfl: 5    gabapentin (NEURONTIN) 300 mg capsule, Take 1 capsule (300 mg total) by mouth 3 (three) times a day, Disp: 90 capsule, Rfl: 0    HYDROcodone-acetaminophen (NORCO) 5-325 mg per tablet, Take 1 tablet by mouth every 6 (six) hours as needed for painMax Daily Amount: 4 tablets, Disp: 90 tablet, Rfl: 0    hydrOXYzine HCL (ATARAX) 25 mg tablet, Take 1 tablet (25 mg total) by mouth every 6 (six) hours as needed for anxiety, Disp: 90 tablet, Rfl: 0    ipratropium-albuterol (DUO-NEB) 0 5-2 5 mg/3 mL nebulizer solution, Take 1 vial (3 mL total) by nebulization every 6 (six) hours while awake, Disp: 300 mL, Rfl: 0    iron polysaccharides (Ferrex 150) 150 mg capsule, Take 1 capsule (150 mg total) by mouth daily, Disp: 60 capsule, Rfl: 5    lidocaine (LIDODERM) 5 %, Apply 1 patch topically daily Remove & Discard patch within 12 hours or as directed by MD, Disp: 30 patch, Rfl: 0    menthol-methyl salicylate (BENGAY) 27-64 % cream, Apply topically 4 (four) times a day as needed (back pain), Disp: 85 g, Rfl: 0    metFORMIN (GLUCOPHAGE) 500 mg tablet, Take 1 tablet (500 mg total) by mouth 2 (two) times a day with meals, Disp: 60 tablet, Rfl: 3    methocarbamol (ROBAXIN) 500 mg tablet, Take 1 tablet (500 mg total) by mouth every 8 (eight) hours for 7 days, Disp: 21 tablet, Rfl: 0    metoprolol tartrate (LOPRESSOR) 25 mg tablet, take 1/2 tablet by mouth every 12 hours, Disp: 90 tablet, Rfl: 0    montelukast (SINGULAIR) 10 mg tablet, Take 1 tablet (10 mg total) by mouth daily at bedtime, Disp: 90 tablet, Rfl: 1    OXYGEN-HELIUM IN, Inhale, Disp: , Rfl:     pantoprazole (PROTONIX) 40 mg tablet, take 1 tablet by mouth twice a day, Disp: 60 tablet, Rfl: 5    polyethylene glycol (MIRALAX) 17 g packet, Take 17 g by mouth daily, Disp: 510 g, Rfl: 0    potassium chloride (K-DUR,KLOR-CON) 20 mEq tablet, Take 2 tablets (40 mEq total) by mouth daily, Disp: 60 tablet, Rfl: 0    predniSONE 5 mg tablet, Take 1 tablet (5 mg total) by mouth 2 (two) times a day with meals Twice daily, Disp: 60 tablet, Rfl: 5    psyllium (METAMUCIL) packet, Take 1 packet by mouth 3 (three) times a day, Disp: 100 packet, Rfl: 1    simethicone (MYLICON) 80 mg chewable tablet, Chew 1 tablet (80 mg total) every 6 (six) hours as needed for flatulence, Disp: 30 tablet, Rfl: 0    sitaGLIPtin (JANUVIA) 50 mg tablet, Take 1 tablet (50 mg total) by mouth daily, Disp: 30 tablet, Rfl: 0    theophylline (CORTNEY-24) 200 MG 24 hr capsule, Take 1 capsule (200 mg total) by mouth daily, Disp: 30 capsule, Rfl: 0  No current facility-administered medications for this visit  Allergies   Allergen Reactions    Bee Venom Shortness Of Breath    Fluticasone      Per pt  error       Physical Exam:    Ht 5' (1 524 m)   Wt 60 8 kg (134 lb)   LMP  (LMP Unknown)   BMI 26 17 kg/m²     Constitutional: patient very argumentative  She looks ill    Eyes:anicteric  HEENT:grossly intact  Neck:supple, symmetric, trachea midline and no masses Pulmonary:  Using oxygen via nasal cannula  patient appears to become short of breath with minimal exertion    Cardiovascular:No edema or pitting edema present  Skin:Normal without rashes or lesions and well hydrated  Psychiatric:Mood and affect appropriate  Neurologic:Cranial Nerves II-XII grossly intact  Musculoskeletal:in wheelchair      Imaging  FL spine and pain procedure    (Results Pending)         Orders Placed This Encounter   Procedures    [place] spine and pain procedure

## 2021-04-09 NOTE — ASSESSMENT & PLAN NOTE
· Recent hospitalization 4/3/2021 to 4/7/2021  · Follow-up PCP - 4/9/2021  · Follow-up pain management - 4/9/2021 appointment   · Follow-up with endocrinology/DM education - referrals placed  · Referral to palliative care service - referral placed

## 2021-04-09 NOTE — ASSESSMENT & PLAN NOTE
· 4/3/2021 Xray Lumbar Spine:    · The bones are demineralized  Multiple compression fractures including T12, L1, L2, and L4 appear grossly similar in extent to prior CT 2/24/2020 accounting for differences in technique  · Lumbar levoscoliosis  · Degenerative changes of the lumbar spine characterized by anterior endplate osteophytes and loss of disc height most pronounced at L1-L2  · There are atherosclerotic calcifications  Soft tissues are otherwise unremarkable  · Patient receives Depo-Medrol injections for Sacroillitis by Dr Delma Ochoa  - scheduled next on 4/9/2021  · Previous Radiology:   · 2/23/2021 CT Lumbar Spine:   · VERTEBRAL BODIES:     · L1 compression deformity with 20% loss of height      · T12 compression deformity 15% loss of height      · Stable L4 compression deformity    · DEGENERATIVE CHANGES:   · Lower Thoracic spine: Moderate multilevel degenerative changes    · PARASPINAL SOFT TISSUES: Larger simple renal cyst is seen  Diverticulosis without evidence for diverticulitis   Otherwise normal

## 2021-04-09 NOTE — PATIENT INSTRUCTIONS
Discontinue oxycodone  You are prescribed hydrocodone today by your PCP  You cannot take both hydrocodone and oxycodone together  Taking these medications together could lead to respiratory depression/death

## 2021-04-09 NOTE — TELEPHONE ENCOUNTER
----- Message from [address] sent at 4/9/2021 12:58 PM EDT -----  Regarding: FW: Ethan Garner,   I knew that she was going over to see you  She was very argumentative today here as well  Even "nasty" about some things  I was explaining things with her and she flat out told me that she didn't want to hear anything I had to say, she just wanted to get to your appointment  I too discussed her diabetes medications, for which she told me that it was (essentially) my fault that I didn't diagnose her earlier  Also, I did tell her that she should not take the percocet, and that you should be told about the new pain medication, I even gave a flourescent yellow note card to her friend to give to you - with notes about my visit today  I too feel that she needed rehab  I had been speaking with the internal medicine team all week while she was hospitalized  It is unfortunate, she just does not want to do many of the things we think are best for her  Thank Ag Wynn   ----- Message -----  From: LEEROY Hudson  Sent: 4/9/2021  12:36 PM EDT  To: LEEROY Tolbert  Subject: Jacky Buckner,    I saw Shawn Moy in the office this morning for a follow-up visit  It looks like she also saw you today  She was recently discharged from the hospital   Looks like she was admitted for ambulatory dysfunction in back pain, but was also diagnosed with diabetes  It looks like they wanted to keep her for rehab, but she refused  I am just reaching out to you to let you know that I am not sure how safe she is going to be at home  Between her breathing issues and back pain I feel like she is going to be a really high risk for a fall  I did discuss with her that they had recommended rehab and she refused  Again, patient was very insistent that she would prefer to do home PT  When I was reviewing her meds, she seemed fairly confused as to what she is supposed to be taking  For example, she did not even know that she was being prescribed medicines for the newly diagnosed diabetes  She was pretty argumentative throughout her entire visit here from the time she checked in until the time she left  We have been prescribing her oxycodone and I notice that you prescribed hydrocodone today  At this point, we will discontinue the oxycodone  I advised her to not take these 2 medications together and she seemed to understand that  Also I reiterated that point to her  who verbalized understanding    Thanks,  [employer]

## 2021-04-09 NOTE — ASSESSMENT & PLAN NOTE
· Patient with worsening ability to ambulate due to chronic compression fractures  · Patient recently hospitalized for this issue  · PT/OT recommended short term rehab, patient refused  · [facility] ordered for the patient

## 2021-04-12 NOTE — TELEPHONE ENCOUNTER
Please call the patient and let her know that there is not anything I can do differently for her back pain  If she hasnt called her pain management doctor she should  And    if the pain is that bad, she should probably go back to the hospital, as she was given IV pain medications when she was admitted the other day   Thank You

## 2021-04-12 NOTE — ED PROVIDER NOTES
History  Chief Complaint   Patient presents with    Back Pain     cxhronic  Was here last week and admitted for same     Patient is an 81 y/o female with a PMHx of multiple lumbar compression fractures, COPD, CHF, paroxysmal atrial fibrillation and type 2 diabetes mellitus, presenting to the ED for evaluation of chronic back pain, not controlled with home medications  Patient was discharged on 4/7/21 for same  She said that a home nurse was supposed to come to her house the next day and never showed up  Patient has been taking Norco at home with no relief  She was seen by [facility] and had an injection on 4/9 that relieved her pain for a short period of time but it has returned and is not relieved with narcotics/muscle relaxers  She states, "they never should have sent me home"  She denies recent falls/trauma  She denies lower extremity weakness, bowel/bladder incontinence or saddle anesthesia  Prior to Admission Medications   Prescriptions Last Dose Informant Patient Reported? Taking?    Artificial Tear Solution (GENTEAL TEARS) 0 1-0 2-0 3 % SOLN Unknown at Unknown time Self Yes No   Sig: Administer 1 drop to both eyes 3 (three) times a day   Calcium Carb-Cholecalciferol (CALCIUM 1000 + D PO) Unknown at Unknown time Self Yes No   Sig: Take 1,000 mg by mouth daily   HYDROcodone-acetaminophen (NORCO) 5-325 mg per tablet Unknown at Unknown time  No No   Sig: Take 1 tablet by mouth every 6 (six) hours as needed for painMax Daily Amount: 4 tablets   OXYGEN-HELIUM IN Unknown at Unknown time Self Yes No   Sig: Inhale   alendronate (FOSAMAX) 70 mg tablet Unknown at Unknown time  No No   Sig: take 1 tablet by mouth every 7 days   apixaban (ELIQUIS) 5 mg Unknown at Unknown time  No No   Sig: Take 1 tablet (5 mg total) by mouth 2 (two) times a day   budesonide-formoterol (Symbicort) 160-4 5 mcg/act inhaler Unknown at Unknown time Self Yes No   Sig: Inhale 2 puffs 2 (two) times a day fluticasone-umeclidinium-vilanterol (Trelegy Ellipta) 100-62 5-25 MCG/INH inhaler Unknown at Unknown time Self No No   Sig: Inhale 1 puff daily Rinse mouth after use     furosemide (LASIX) 20 mg tablet Unknown at Unknown time  No No   Sig: take 2 tablets by mouth twice a day   gabapentin (NEURONTIN) 300 mg capsule Unknown at Unknown time  No No   Sig: Take 1 capsule (300 mg total) by mouth 3 (three) times a day   hydrOXYzine HCL (ATARAX) 25 mg tablet Unknown at Unknown time  No No   Sig: Take 1 tablet (25 mg total) by mouth every 6 (six) hours as needed for anxiety   ipratropium-albuterol (DUO-NEB) 0 5-2 5 mg/3 mL nebulizer solution Unknown at Unknown time Self No No   Sig: Take 1 vial (3 mL total) by nebulization every 6 (six) hours while awake   iron polysaccharides (Ferrex 150) 150 mg capsule Unknown at Unknown time  No No   Sig: Take 1 capsule (150 mg total) by mouth daily   lidocaine (LIDODERM) 5 % Unknown at Unknown time  No No   Sig: Apply 1 patch topically daily Remove & Discard patch within 12 hours or as directed by MD   menthol-methyl salicylate (BENGAY) 29-30 % cream Unknown at Unknown time  No No   Sig: Apply topically 4 (four) times a day as needed (back pain)   metFORMIN (GLUCOPHAGE) 500 mg tablet Unknown at Unknown time  No No   Sig: Take 1 tablet (500 mg total) by mouth 2 (two) times a day with meals   methocarbamol (ROBAXIN) 500 mg tablet Unknown at Unknown time  No No   Sig: Take 1 tablet (500 mg total) by mouth every 8 (eight) hours for 7 days   metoprolol tartrate (LOPRESSOR) 25 mg tablet Unknown at Unknown time  No No   Sig: take 1/2 tablet by mouth every 12 hours   montelukast (SINGULAIR) 10 mg tablet Unknown at Unknown time  No No   Sig: Take 1 tablet (10 mg total) by mouth daily at bedtime   pantoprazole (PROTONIX) 40 mg tablet Unknown at Unknown time  No No   Sig: take 1 tablet by mouth twice a day   polyethylene glycol (MIRALAX) 17 g packet   No No   Sig: Take 17 g by mouth daily potassium chloride (K-DUR,KLOR-CON) 20 mEq tablet   No No   Sig: Take 2 tablets (40 mEq total) by mouth daily   predniSONE 5 mg tablet Unknown at Unknown time  No No   Sig: Take 1 tablet (5 mg total) by mouth 2 (two) times a day with meals Twice daily   psyllium (METAMUCIL) packet Unknown at Unknown time Self No No   Sig: Take 1 packet by mouth 3 (three) times a day   simethicone (MYLICON) 80 mg chewable tablet Unknown at Unknown time Self No No   Sig: Chew 1 tablet (80 mg total) every 6 (six) hours as needed for flatulence   sitaGLIPtin (JANUVIA) 50 mg tablet Unknown at Unknown time  No No   Sig: Take 1 tablet (50 mg total) by mouth daily   theophylline (CORTNEY-24) 200 MG 24 hr capsule Unknown at Unknown time Self No No   Sig: Take 1 capsule (200 mg total) by mouth daily      Facility-Administered Medications: None       Past Medical History:   Diagnosis Date    Allergies     Asthma     Chronic diastolic CHF (congestive heart failure) (HCC)     Colitis     Colon polyp     COPD (chronic obstructive pulmonary disease)     Diverticulosis     DJD (degenerative joint disease)     Gait abnormality     Hypertension     Paroxysmal atrial fibrillation (HCC)        Past Surgical History:   Procedure Laterality Date    BLADDER SURGERY      COLON SURGERY      COLONOSCOPY      COLONOSCOPY W/ POLYPECTOMY N/A 1/21/2019    Procedure: COLONOSCOPY W/ POLYPECTOMY;  Surgeon: Gary Vee MD;  Location: [facility]; Service: General    FL GUIDED NEEDLE PLAC BX/ASP/INJ  9/2/2020    FL GUIDED NEEDLE PLAC BX/ASP/INJ  1/21/2021    GASTROJEJUNOSTOMY W/ JEJUNOSTOMY TUBE N/A 2/3/2020    Procedure: Antrectomy with bilroth II Anastomosis;   Surgeon: Nicholas Peterson MD;  Location: [facility];  Service: Robert Pace JOINT Right 9/2/2020    Procedure: BLOCK / INJECTION SACROILIAC;  Surgeon: Donte Pena MD;  Location: [facility];  Service: Pain Management     WA INJECTION,SACROILIAC JOINT Right 2021    Procedure: RIGHT SACROILIAC JOINT INJECTION;  Surgeon: Darya Valentin MD;  Location: [facility];  Service: Pain Management     SMALL INTESTINE SURGERY  2020    with partial gastrectomy       Family History   Problem Relation Age of Onset    Heart disease Mother      I have reviewed and agree with the history as documented  E-Cigarette/Vaping    E-Cigarette Use Never User      E-Cigarette/Vaping Substances    Nicotine No     THC No     CBD No     Flavoring No     Other No     Unknown No      Social History     Tobacco Use    Smoking status: Former Smoker     Quit date: 2013     Years since quittin 3    Smokeless tobacco: Never Used   Substance Use Topics    Alcohol use: Never     Alcohol/week: 0 0 standard drinks     Frequency: Never     Drinks per session: Patient refused     Binge frequency: Never    Drug use: Never       Review of Systems   Constitutional: Negative for appetite change, chills, fatigue and fever  HENT: Negative for congestion, rhinorrhea, sinus pressure, sinus pain and sore throat  Eyes: Negative for photophobia and visual disturbance  Respiratory: Negative for cough, shortness of breath and wheezing  Cardiovascular: Negative for chest pain, palpitations and leg swelling  Gastrointestinal: Negative for abdominal pain, blood in stool, constipation, diarrhea, nausea and vomiting  Genitourinary: Negative for difficulty urinating, dysuria, flank pain, frequency, hematuria and urgency  Musculoskeletal: Positive for back pain  Negative for arthralgias, joint swelling, myalgias and neck pain  Skin: Negative for color change, pallor and rash  Neurological: Negative for dizziness, syncope, weakness, light-headedness and headaches  Psychiatric/Behavioral: Negative for confusion and sleep disturbance  All other systems reviewed and are negative  Physical Exam  Physical Exam  Vitals signs and nursing note reviewed     Constitutional: General: She is awake  Appearance: Normal appearance  She is well-developed  She is not toxic-appearing or diaphoretic  HENT:      Head: Normocephalic and atraumatic  Right Ear: External ear normal       Left Ear: External ear normal       Nose: Nose normal       Mouth/Throat:      Lips: Pink  Mouth: Mucous membranes are moist    Eyes:      General: Lids are normal  No scleral icterus  Conjunctiva/sclera: Conjunctivae normal       Pupils: Pupils are equal, round, and reactive to light  Neck:      Musculoskeletal: Full passive range of motion without pain and neck supple  No injury or pain with movement  Cardiovascular:      Rate and Rhythm: Normal rate and regular rhythm  Pulses: Normal pulses  Radial pulses are 2+ on the right side and 2+ on the left side  Heart sounds: Normal heart sounds, S1 normal and S2 normal    Pulmonary:      Effort: Pulmonary effort is normal  No accessory muscle usage  Breath sounds: Normal breath sounds  No stridor  No decreased breath sounds, wheezing, rhonchi or rales  Abdominal:      General: Abdomen is flat  Bowel sounds are normal  There is no distension  Palpations: Abdomen is soft  Tenderness: There is no abdominal tenderness  There is no right CVA tenderness, left CVA tenderness, guarding or rebound  Musculoskeletal:      Right lower leg: No edema  Left lower leg: No edema  Comments: Tenderness to palpation of bilateral paraspinal muscles  No midline tenderness, deformities or step-offs  Strength 5/5 in bilateral lower extremities  Sensation intact, neurovascularly intact  Lymphadenopathy:      Cervical: No cervical adenopathy  Skin:     General: Skin is warm and dry  Capillary Refill: Capillary refill takes less than 2 seconds  Coloration: Skin is not cyanotic, jaundiced or pale  Neurological:      Mental Status: She is alert and oriented to person, place, and time        GCS: GCS eye subscore is 4  GCS verbal subscore is 5  GCS motor subscore is 6  Gait: Gait normal    Psychiatric:         Mood and Affect: Mood normal          Speech: Speech normal          Behavior: Behavior is cooperative           Vital Signs  ED Triage Vitals   Temperature Pulse Respirations Blood Pressure SpO2   04/12/21 1826 04/12/21 1826 04/12/21 1826 04/12/21 1826 04/12/21 1155   (!) 97 4 °F (36 3 °C) (!) 110 22 119/68 97 %      Temp Source Heart Rate Source Patient Position - Orthostatic VS BP Location FiO2 (%)   04/12/21 1826 -- 04/12/21 1826 04/12/21 1826 --   Temporal  Lying Left arm       Pain Score       04/12/21 1826       Worst Possible Pain           Vitals:    04/12/21 2014 04/12/21 2236 04/13/21 0700 04/13/21 1500   BP: 120/70 117/60 104/57 118/60   Pulse: 88 70 61 71   Patient Position - Orthostatic VS: Lying Lying Lying Lying         Visual Acuity      ED Medications  Medications   acetaminophen (TYLENOL) tablet 650 mg (has no administration in time range)   oxyCODONE-acetaminophen (PERCOCET) 5-325 mg per tablet 1 tablet (1 tablet Oral Given 4/13/21 0854)   budesonide-formoterol (SYMBICORT) 160-4 5 mcg/act inhaler 2 puff (2 puffs Inhalation Given 4/13/21 1710)   gabapentin (NEURONTIN) capsule 300 mg (300 mg Oral Given 4/13/21 1711)   furosemide (LASIX) tablet 40 mg (40 mg Oral Given 4/13/21 1711)   lidocaine (LIDODERM) 5 % patch 1 patch (1 patch Topical Medication Applied 4/13/21 0854)   metFORMIN (GLUCOPHAGE) tablet 500 mg (500 mg Oral Given 4/13/21 1711)   montelukast (SINGULAIR) tablet 10 mg (10 mg Oral Given 4/12/21 2152)   polyethylene glycol (MIRALAX) packet 17 g (17 g Oral Given 4/13/21 0854)   predniSONE tablet 5 mg (5 mg Oral Given 4/13/21 1711)   psyllium (METAMUCIL) 1 packet (1 packet Oral Given 4/13/21 1710)   theophylline (CORTNEY-24) 24 hr capsule 200 mg (200 mg Oral Given 4/13/21 8099)   pantoprazole (PROTONIX) EC tablet 40 mg (40 mg Oral Given 4/13/21 2406)   metoprolol tartrate (LOPRESSOR) partial tablet 12 5 mg (0 mg Oral Hold 4/13/21 0900)   tiotropium (SPIRIVA) capsule for inhaler 18 mcg (18 mcg Inhalation Given 4/13/21 0856)   glycerin-hypromellose- (ARTIFICIAL TEARS) ophthalmic solution 2 drop (2 drops Both Eyes Given 4/13/21 1055)   HYDROmorphone (DILAUDID) injection 0 5 mg (0 5 mg Intravenous Given 4/13/21 0409)   methocarbamol (ROBAXIN) tablet 500 mg (500 mg Oral Given 4/12/21 2313)   apixaban (ELIQUIS) tablet 2 5 mg (2 5 mg Oral Given 4/13/21 1711)   insulin glargine (LANTUS) subcutaneous injection 8 Units 0 08 mL (8 Units Subcutaneous Given 4/13/21 0054)   albuterol inhalation solution 2 5 mg (2 5 mg Nebulization Given 4/12/21 2356)   levalbuterol (XOPENEX) inhalation solution 1 25 mg (1 25 mg Nebulization Given 4/13/21 1327)     And   sodium chloride 0 9 % inhalation solution 3 mL (3 mL Nebulization Given 4/13/21 1327)   HYDROmorphone (DILAUDID) injection 0 5 mg (0 5 mg Intravenous Given 4/12/21 1929)   lidocaine (LIDODERM) 5 % patch 1 patch (1 patch Topical Patch Removed 4/13/21 0850)   potassium chloride oral solution 20 mEq (20 mEq Oral Given 4/13/21 1450)   magnesium sulfate 2 g/50 mL IVPB (premix) 2 g (2 g Intravenous New Bag 4/13/21 1242)       Diagnostic Studies  Results Reviewed     Procedure Component Value Units Date/Time    Basic metabolic panel [[id number]]  (Abnormal) Collected: 04/12/21 1928    Lab Status: Final result Specimen: Blood from Arm, Right Updated: 04/12/21 1951     Sodium 136 mmol/L      Potassium 4 1 mmol/L      Chloride 96 mmol/L      CO2 29 mmol/L      ANION GAP 11 mmol/L      BUN 18 mg/dL      Creatinine 0 70 mg/dL      Glucose 111 mg/dL      Calcium 9 7 mg/dL      eGFR 80 ml/min/1 73sq m     Narrative:      [place] guidelines for Chronic Kidney Disease (CKD):     Stage 1 with normal or high GFR (GFR > 90 mL/min/1 73 square meters)    Stage 2 Mild CKD (GFR = 60-89 mL/min/1 73 square meters)    Stage 3A Moderate CKD (GFR = 45-59 mL/min/1 73 square meters)    Stage 3B Moderate CKD (GFR = 30-44 mL/min/1 73 square meters)    Stage 4 Severe CKD (GFR = 15-29 mL/min/1 73 square meters)    Stage 5 End Stage CKD (GFR <15 mL/min/1 73 square meters)  Note: GFR calculation is accurate only with a steady state creatinine  National Kidney Disease Foundation guidelines for Chronic Kidney Disease (CKD):     Stage 1 with normal or high GFR (GFR > 90 mL/min/1 73 square meters)    Stage 2 Mild CKD (GFR = 60-89 mL/min/1 73 square meters)    Stage 3A Moderate CKD (GFR = 45-59 mL/min/1 73 square meters)    Stage 3B Moderate CKD (GFR = 30-44 mL/min/1 73 square meters)    Stage 4 Severe CKD (GFR = 15-29 mL/min/1 73 square meters)    Stage 5 End Stage CKD (GFR <15 mL/min/1 73 square meters)  Note: GFR calculation is accurate only with a steady state creatinine    Smear Review(Phlebs Do Not Order) [[id number]] Collected: 04/12/21 1928    Lab Status: Final result Specimen: Blood from Arm, Right Updated: 04/12/21 1948     RBC Morphology abnormal     Anisocytosis Present     Microcytes Present     Ovalocytes Present     Platelet Estimate Adequate    CBC and differential [[id number]]  (Abnormal) Collected: 04/12/21 1928    Lab Status: Final result Specimen: Blood from Arm, Right Updated: 04/12/21 1934     WBC 5 80 Thousand/uL      RBC 4 53 Million/uL      Hemoglobin 10 4 g/dL      Hematocrit 33 4 %      MCV 74 fL      MCH 23 0 pg      MCHC 31 2 g/dL      RDW 20 1 %      MPV 7 7 fL      Platelets 512 Thousands/uL      Neutrophils Relative 78 %      Lymphocytes Relative 13 %      Monocytes Relative 8 %      Eosinophils Relative 1 %      Basophils Relative 1 %      Neutrophils Absolute 4 50 Thousands/µL      Lymphocytes Absolute 0 70 Thousands/µL      Monocytes Absolute 0 40 Thousand/µL      Eosinophils Absolute 0 10 Thousand/µL      Basophils Absolute 0 10 Thousands/µL                  No orders to display              Procedures  Procedures ED Course                                           MDM  Number of Diagnoses or Management Options  Chronic back pain: established and worsening  Intractable back pain: established and worsening  Diagnosis management comments: Patient is an 81 y/o female with a PMHx of multiple lumbar compression fractures, COPD, CHF, paroxysmal atrial fibrillation and type 2 diabetes mellitus, presenting to the ED for evaluation of chronic back pain, not controlled with home medications  Patient with recent admission for same - not pain controlled on home medication  Discussed with SLIM and patient will be admitted under observation at this time  The management plan was discussed in detail with the patient at bedside and all questions were answered           Amount and/or Complexity of Data Reviewed  Clinical lab tests: ordered and reviewed    Patient Progress  Patient progress: stable      Disposition  Final diagnoses:   Chronic back pain   Intractable back pain     Time reflects when diagnosis was documented in both MDM as applicable and the Disposition within this note     Time User Action Codes Description Comment    4/12/2021  7:30 PM Luster Sacks Add [M54 9,  G89 29] Chronic back pain     4/12/2021  7:31 PM Luster Sacks Add [M54 9] Intractable back pain       ED Disposition     ED Disposition Condition Date/Time Comment    Admit Stable Mon Apr 12, 2021  7:30 PM Case was discussed with Dr Mart Anlgin and the patient's admission status was agreed to be Admission Status: observation status to the service of Dr Mart Anglin         Follow-up Information    None         Current Discharge Medication List      CONTINUE these medications which have NOT CHANGED    Details   alendronate (FOSAMAX) 70 mg tablet take 1 tablet by mouth every 7 days  Qty: 4 tablet, Refills: 5    Associated Diagnoses: Osteoporosis, unspecified osteoporosis type, unspecified pathological fracture presence      apixaban (ELIQUIS) 5 mg Take 1 tablet (5 mg total) by mouth 2 (two) times a day  Qty: 60 tablet, Refills: 5    Associated Diagnoses: Paroxysmal atrial fibrillation ([facility])      Artificial Tear Solution (GENTEAL TEARS) 0 1-0 2-0 3 % SOLN Administer 1 drop to both eyes 3 (three) times a day      budesonide-formoterol (Symbicort) 160-4 5 mcg/act inhaler Inhale 2 puffs 2 (two) times a day      Calcium Carb-Cholecalciferol (CALCIUM 1000 + D PO) Take 1,000 mg by mouth daily      fluticasone-umeclidinium-vilanterol (Trelegy Ellipta) 100-62 5-25 MCG/INH inhaler Inhale 1 puff daily Rinse mouth after use    Qty: 3 Inhaler, Refills: 1    Associated Diagnoses: Simple chronic bronchitis ([facility])      furosemide (LASIX) 20 mg tablet take 2 tablets by mouth twice a day  Qty: 60 tablet, Refills: 5    Associated Diagnoses: Chronic respiratory failure with hypoxia ([facility])      gabapentin (NEURONTIN) 300 mg capsule Take 1 capsule (300 mg total) by mouth 3 (three) times a day  Qty: 90 capsule, Refills: 0    Associated Diagnoses: Compression fracture of L4 vertebra with routine healing, subsequent encounter      HYDROcodone-acetaminophen (NORCO) 5-325 mg per tablet Take 1 tablet by mouth every 6 (six) hours as needed for painMax Daily Amount: 4 tablets  Qty: 90 tablet, Refills: 0    Associated Diagnoses: Chronic pain syndrome      hydrOXYzine HCL (ATARAX) 25 mg tablet Take 1 tablet (25 mg total) by mouth every 6 (six) hours as needed for anxiety  Qty: 90 tablet, Refills: 0    Associated Diagnoses: Anxiety      ipratropium-albuterol (DUO-NEB) 0 5-2 5 mg/3 mL nebulizer solution Take 1 vial (3 mL total) by nebulization every 6 (six) hours while awake  Qty: 300 mL, Refills: 0    Associated Diagnoses: COPD (chronic obstructive pulmonary disease) ([facility])      iron polysaccharides (Ferrex 150) 150 mg capsule Take 1 capsule (150 mg total) by mouth daily  Qty: 60 capsule, Refills: 5    Associated Diagnoses: Iron deficiency anemia      lidocaine (LIDODERM) 5 % Apply 1 patch topically daily Remove & Discard patch within 12 hours or as directed by MD  Qty: 30 patch, Refills: 0    Associated Diagnoses: Compression fracture of L4 vertebra with routine healing, subsequent encounter      menthol-methyl salicylate (BENGAY) 71-73 % cream Apply topically 4 (four) times a day as needed (back pain)  Qty: 85 g, Refills: 0    Associated Diagnoses: Compression fracture of L4 vertebra with routine healing, subsequent encounter      metFORMIN (GLUCOPHAGE) 500 mg tablet Take 1 tablet (500 mg total) by mouth 2 (two) times a day with meals  Qty: 60 tablet, Refills: 3    Associated Diagnoses: Type 2 diabetes mellitus without complication, without long-term current use of insulin ([facility])      methocarbamol (ROBAXIN) 500 mg tablet Take 1 tablet (500 mg total) by mouth every 8 (eight) hours for 7 days  Qty: 21 tablet, Refills: 0    Associated Diagnoses: Compression fracture of L4 vertebra with routine healing, subsequent encounter      metoprolol tartrate (LOPRESSOR) 25 mg tablet take 1/2 tablet by mouth every 12 hours  Qty: 90 tablet, Refills: 0    Associated Diagnoses: Congestive heart failure (HCC)      montelukast (SINGULAIR) 10 mg tablet Take 1 tablet (10 mg total) by mouth daily at bedtime  Qty: 90 tablet, Refills: 1    Associated Diagnoses: Seasonal allergies      OXYGEN-HELIUM IN Inhale      pantoprazole (PROTONIX) 40 mg tablet take 1 tablet by mouth twice a day  Qty: 60 tablet, Refills: 5    Associated Diagnoses: Gastroesophageal reflux disease without esophagitis      polyethylene glycol (MIRALAX) 17 g packet Take 17 g by mouth daily  Qty: 510 g, Refills: 0    Associated Diagnoses: Constipation      potassium chloride (K-DUR,KLOR-CON) 20 mEq tablet Take 2 tablets (40 mEq total) by mouth daily  Qty: 60 tablet, Refills: 0    Associated Diagnoses: CHF (congestive heart failure) (HCC)      predniSONE 5 mg tablet Take 1 tablet (5 mg total) by mouth 2 (two) times a day with meals Twice daily  Qty: 60 tablet, Refills: 5 Associated Diagnoses: Chronic respiratory failure with hypoxia (HCC)      psyllium (METAMUCIL) packet Take 1 packet by mouth 3 (three) times a day  Qty: 100 packet, Refills: 1    Associated Diagnoses: Diarrhea      simethicone (MYLICON) 80 mg chewable tablet Chew 1 tablet (80 mg total) every 6 (six) hours as needed for flatulence  Qty: 30 tablet, Refills: 0    Associated Diagnoses: Gastric outlet obstruction      sitaGLIPtin (JANUVIA) 50 mg tablet Take 1 tablet (50 mg total) by mouth daily  Qty: 30 tablet, Refills: 0    Associated Diagnoses: Type 2 diabetes mellitus without complication, without long-term current use of insulin (HCC)      theophylline (CORTNEY-24) 200 MG 24 hr capsule Take 1 capsule (200 mg total) by mouth daily  Qty: 30 capsule, Refills: 0    Associated Diagnoses: Simple chronic bronchitis (HCC)           No discharge procedures on file      PDMP Review       Value Time User    PDMP Reviewed  Yes 4/9/2021  9:23 AM Raúl Escamilla          ED Provider  Electronically Signed by           Michelle Lagunas PA-C  04/13/21 2279

## 2021-04-12 NOTE — TELEPHONE ENCOUNTER
Pt was discharged from hospital 2 days ago, still in a lot of pain  She's wondering what other pain med she can take, norco 5/325 isn't working  She has a nurse coming to the house tomorrow

## 2021-04-13 NOTE — ASSESSMENT & PLAN NOTE
82year old female admitted due to intractable back pain  Similar to prior due to known compression fractures     - Previously discharged to home since she refused rehab  - PT/OT  - Pain control

## 2021-04-13 NOTE — ASSESSMENT & PLAN NOTE
Lab Results   Component Value Date    HGBA1C 12 6 (A) 04/09/2021       Recent Labs     04/12/21  2119 04/13/21  0558 04/13/21  1053   POCGLU 109 100 134       Blood Sugar Average: Last 72 hrs:  (P) 506 [id number]     Sliding scale  Monitor fingersticks  Previously on Lantus 25U HS during previous admission  However due to limited vision she was discharged with metformin 500mg BID and januvia 50mg daily  Will maintain her on Lantus 8U HS for now since glucose levels are acceptable

## 2021-04-13 NOTE — PHYSICAL THERAPY NOTE
Physical Therapy Evaluation     Patient's Name: Liborio Velazquez    Admitting Diagnosis  Back pain [M54 9]  Chronic back pain [M54 9, G89 29]  Intractable back pain [M54 9]    Problem List  Patient Active Problem List   Diagnosis    Asthma    COPD (chronic obstructive pulmonary disease) ([facility] )    Cardiac disease    Diverticulosis of intestine with bleeding    Diarrhea    Colorectal polyps    Effusion of bursa of left elbow    Cardiomegaly    Chronic anxiety    Chronic cystitis    Chronic bilateral low back pain without sciatica    [facility]est[facility] failure ([facility]    Deviated nasal septum    Diverticulosis of colon    Gastroesophageal reflux disease without esophagitis    Gout    History of angioedema    History of colonic polyps    Lumbar radiculopathy    Macular degeneration of both eyes    Obesity    Osteoporosis    Other specified forms of chronic ischemic heart disease    Seasonal allergies    Panic attack    Vocal cord dysfunction    Colitis presumed infectious    Atrial fibrillation (HCC)    Left elbow pain    Acute on chronic respiratory failure with hypoxia ([facility] )    Debility    Positive culture findings in sputum    Compression fracture of L4 vertebra (HCC)    Closed wedge compression fracture of T12 vertebra (HCC)    Wound dehiscence, surgical, subsequent encounter    Chronic peptic ulcer of stomach    Chronic pain syndrome    Sacroiliitis (HCC)    Bronchitis, chronic, simple (HCC)    Paroxysmal atrial fibrillation (HCC)    Chronic diastolic CHF (congestive heart failure) (HCC)    SIRS (systemic inflammatory response syndrome) (HCC)    Hypokalemia    Shortness of breath    Gastrostomy complication, unspecified (HCC)    Anxiety    Chronic respiratory failure with hypoxia (HCC)    Absolute anemia    Encounter for long-term opiate analgesic use    Uncomplicated opioid dependence ([facility]    Type 2 diabetes mellitus without complication, without long-term current use of insulin ([facility] )    Ambulatory dysfunction    History of recent hospitalization       Past Medical History  Past Medical History:   Diagnosis Date    Allergies     Asthma     Chronic diastolic CHF (congestive heart failure) (HCC)     Colitis     Colon polyp     COPD (chronic obstructive pulmonary disease)     Diverticulosis     DJD (degenerative joint disease)     Gait abnormality     Hypertension     Paroxysmal atrial fibrillation (HCC)        Past Surgical History  Past Surgical History:   Procedure Laterality Date    BLADDER SURGERY      COLON SURGERY      COLONOSCOPY      COLONOSCOPY W/ POLYPECTOMY N/A 1/21/2019    Procedure: COLONOSCOPY W/ POLYPECTOMY;  Surgeon: Kerry Martel MD;  Location: [address] [place] [address] GI LAB; Service: General    FL GUIDED NEEDLE PLAC BX/ASP/INJ  9/2/2020    FL GUIDED NEEDLE PLAC BX/ASP/INJ  1/21/2021    GASTROJEJUNOSTOMY W/ JEJUNOSTOMY TUBE N/A 2/3/2020    Procedure: Antrectomy with bilroth II Anastomosis; Surgeon: Vinod Rajan MD;  Location:  MAIN OR;  Service: General   [place] JOINT Right 9/2/2020    Procedure: BLOCK / INJECTION SACROILIAC;  Surgeon: Amari Casillas MD;  Location: [facility] MAIN [facility];  Service: Pain Management     MT INJECTION,SACROILIAC JOINT Right 1/21/2021    Procedure: RIGHT SACROILIAC JOINT INJECTION;  Surgeon: Amari Casillas MD;  Location: [facility];  Service: Pain Management     SMALL INTESTINE SURGERY  03/2020    with partial gastrectomy        04/13/21 0827   PT Last Visit   PT Visit Date 04/13/21   Note Type   Note type Evaluation   Pain Assessment   Pain Assessment Tool 0-10   Pain Score 5   Pain Location/Orientation Orientation: Lower; Location: Back   Pain Onset/Description Onset: Ongoing   Effect of Pain on Daily Activities yes   Patient's Stated Pain Goal No pain   Hospital Pain Intervention(s) Medication (See MAR); Ambulation/increased activity;Repositioned   Multiple Pain Sites No   Home Living   Type of [address] One level;Performs ADLs on one level;Stairs to enter with rails  (3 SAVANNAH with rails)   Bathroom Shower/Tub Tub/shower unit   Bathroom Toilet Standard   Bathroom Equipment Grab bars in shower;Grab bars around toilet   [facility]  (has RW, denies using RW at home)   Additional Comments uses 3L O2 at home for all mobility   Prior Function   Level of [place] Independent with ADLs and functional mobility; Needs assistance with IADLs   Lives With Alone   Receives Help From Friend(s); Neighbor   ADL Assistance Independent   IADLs Needs assistance  (help with medications and meals from neighbor and friends)   Falls in the last 6 months 0   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)   Restrictions/Precautions   [place] Bearing Precautions Per Order No   Other Precautions O2;Fall Risk;Pain; Chair Alarm; Bed Alarm   General   Family/Caregiver Present No   Cognition   Arousal/Participation Alert   Orientation Level Oriented X4   Memory Within functional limits   Following Commands Follows one step commands without difficulty   Comments Pt agreeable to PT evaluation   RUE Assessment   RUE Assessment WFL   LUE Assessment   LUE Assessment WFL   RLE Assessment   RLE Assessment X  (4/5 knee extension, 4/5 hip flexion)   LLE Assessment   LLE Assessment X  (4/5 knee extension, 4/5 hip flexion)   Coordination   Movements are Fluid and Coordinated 1   Sensation WFL   Light Touch   RLE Light Touch Grossly intact   LLE Light Touch Grossly intact   Bed Mobility   Supine to Sit 4  Minimal assistance   Additional items Assist x 2   Additional Comments pt denies complaints of dizziness or lightheadedness with bed mobility   Transfers   Sit to Stand 4  Minimal assistance   Additional items Assist x 2; Increased time required;Verbal cues; Impulsive   Stand to Sit 4  Minimal assistance   Additional items Assist x 2   Toilet transfer 4  Minimal assistance   Additional items Assist x 2;Armrests;Commode;Verbal cues   Additional Comments pt needed verbal cues to not hold breath during movement   Ambulation/Elevation   Gait pattern Improper Weight shift; Foward flexed; Inconsistent uri; Short stride   Gait Assistance 4  Minimal assist   Additional items Assist x 2   Assistive Device Rolling walker   Distance 5 steps sideways   Balance   Static Sitting Fair +   Dynamic Sitting Fair -   Static Standing Fair -   Dynamic Standing Fair -   Ambulatory Poor +   Endurance Deficit   Endurance Deficit Yes   Activity Tolerance   Activity Tolerance Patient limited by fatigue;Patient limited by pain   Medical Staff Made Aware MONE Allison present for evaluation   Nurse Made Aware TATUM Andrade verbalized pt appropriate to treat, made aware of outcomes and pain   Assessment   Prognosis Fair   Problem List Decreased strength;Decreased range of motion;Decreased endurance; Impaired balance;Decreased mobility; Decreased safety awareness;Pain; Impaired hearing   Assessment Pt is 82 y o  female seen for PT evaluation on 4/13/2021 s/p admit [address] on 4/12/2021 w/ Ambulatory dysfunction  PT consulted to assess pt's functional mobility and d/c needs  Order placed for PT eval and tx  PT performed at least 2 patient identifiers during session: Name and wristband  Comorbidities affecting pt's physical performance at time of assessment include: CHF, Afib, Asthma, COPD, hypertension, gait abnormality, chronic anxiety, type2 DM, chronic pain syndrome  Before her admission,pt was independent w/ all functional mobility w/ no AD, ambulates household distances, lives alone in one level home and works part time  Personal factors affecting pt at time of IE include: stairs to enter home, unable to perform dynamic tasks in community, limited home support, anxiety, hearing impairments, impulsivity and limited insight into impairments   Please find objective findings from PT assessment regarding body systems outlined above with impairments and limitations including weakness, decreased ROM, impaired balance, decreased endurance, gait deviations, pain, decreased activity tolerance, decreased functional mobility tolerance, decreased safety awareness, fall risk and SOB upon exertion, as well as mobility assessment (need for min of 2 assist w/ all phases of mobility when usually ambulating independently, tolerance to only 5  feet of ambulation and need for cueing for mobility technique)  The following objective measures performed on IE also reveal limitations: AM-PAC 6-Clicks: 24/07  Pt's clinical presentation is currently unstable/unpredictable seen in pt's presentation of abnormal lab value(s), need for input for task focus and mobility technique, need for min of 2 assist w/ all phases of mobility when usually mobilizing independently, tolerance to only 5 feet of ambulation and moderate back pain pain impacting overall mobility status  Pt to benefit from continued PT tx to address deficits as defined above and maximize level of functional independent mobility and consistency  From PT/mobility standpoint, recommendation at time of d/c would be STR pending progress in order to facilitate return to PLOF  Barriers to Discharge Decreased caregiver support; Inaccessible home environment  (3 SAVANNAH)   Goals   Patient Goals "to get stronger and go home"   [facility] Expiration Date 04/23/21   Short Term Goal #1 In 7-10 days: Increase bilateral LE strength 1/2 grade to facilitate independent mobility, Perform all bed mobility tasks with CGA to decrease caregiver burden, Perform all transfers with min A of 1 to improve independence, Ambulate > 150 ft ft  with RW with min A of 1 w/o LOB and w/ normalized gait pattern 100% of the time, Navigate 3 stairs with min A of 1 with unilateral handrail to facilitate return to previous living environment, Increase all balance 1 grade to decrease risk for falls, Tolerate 4 hr OOB to faciliate upright tolerance, Tolerate seated at EOB 10 minutes to facilitate functional task performance and Tolerate standing 10 minutes to facilitate functional task performance    PT Treatment Day 0   Plan   Treatment/Interventions Functional transfer training;LE strengthening/ROM; Therapeutic exercise; Endurance training;Patient/family training;Bed mobility;Gait training;Spoke to nursing   PT Frequency   (3-5x /week)   Recommendation   PT Discharge Recommendation Post acute rehabilitation services   Equipment Recommended [facility] Recommended Wheeled walker   PT - OK to Discharge No   Additional Comments Pt's raw score on the AM-[facility] Basic Mobility inpatient short form is 16, standardized score is 38 32  Patients at this level are likely to benefit from DC to post acute rehab services, however, please refer to therapist recommendation for safe DC planning     AM-PAC Basic Mobility Inpatient   Turning in Bed Without Bedrails 3   Lying on Back to Sitting on Edge of Flat Bed 3   Moving Bed to Chair 3   Standing Up From Chair 3   Walk in Room 2   Climb 3-5 Stairs 2   Basic Mobility Inpatient Raw Score 16   Basic Mobility Standardized Score 38 32           Adan Nichole, SPT

## 2021-04-13 NOTE — PLAN OF CARE
Problem: Potential for Falls  Goal: Patient will remain free of falls  Description: INTERVENTIONS:  - Assess patient frequently for physical needs  -  Identify cognitive and physical deficits and behaviors that affect risk of falls    -  [place] fall precautions as indicated by assessment   - Educate patient/family on patient safety including physical limitations  - Instruct patient to call for assistance with activity based on assessment  - Modify environment to reduce risk of injury  - Consider OT/PT consult to assist with strengthening/mobility  Outcome: Progressing     Problem: Prexisting or High Potential for Compromised Skin Integrity  Goal: Skin integrity is maintained or improved  Description: INTERVENTIONS:  - Identify patients at risk for skin breakdown  - Assess and monitor skin integrity  - Assess and monitor nutrition and hydration status  - Monitor labs   - Assess for incontinence   - Turn and reposition patient  - Assist with mobility/ambulation  - Relieve pressure over bony prominences  - Avoid friction and shearing  - Provide appropriate hygiene as needed including keeping skin clean and dry  - Evaluate need for skin moisturizer/barrier cream  - Collaborate with interdisciplinary team   - Patient/family teaching  - Consider wound care consult   Outcome: Progressing     Problem: PAIN - ADULT  Goal: Verbalizes/displays adequate comfort level or baseline comfort level  Description: Interventions:  - Encourage patient to monitor pain and request assistance  - Assess pain using appropriate pain scale  - Administer analgesics based on type and severity of pain and evaluate response  - Implement non-pharmacological measures as appropriate and evaluate response  - Consider cultural and social influences on pain and pain management  - Notify physician/advanced practitioner if interventions unsuccessful or patient reports new pain  Outcome: Progressing     Problem: INFECTION - ADULT  Goal: Absence or prevention of progression during hospitalization  Description: INTERVENTIONS:  - Assess and monitor for signs and symptoms of infection  - Monitor lab/diagnostic results  - Monitor all insertion sites, i e  indwelling lines, tubes, and drains  - Monitor endotracheal if appropriate and nasal secretions for changes in amount and color  - [place] appropriate cooling/warming therapies per order  - Administer medications as ordered  - Instruct and encourage patient and family to use good hand hygiene technique  - Identify and instruct in appropriate isolation precautions for identified infection/condition  Outcome: Progressing  Goal: Absence of fever/infection during neutropenic period  Description: INTERVENTIONS:  - Monitor WBC    Outcome: Progressing     Problem: SAFETY ADULT  Goal: Patient will remain free of falls  Description: INTERVENTIONS:  - Assess patient frequently for physical needs  -  Identify cognitive and physical deficits and behaviors that affect risk of falls    -  [place] fall precautions as indicated by assessment   - Educate patient/family on patient safety including physical limitations  - Instruct patient to call for assistance with activity based on assessment  - Modify environment to reduce risk of injury  - Consider OT/PT consult to assist with strengthening/mobility  Outcome: Progressing  Goal: Maintain or return to baseline ADL function  Description: INTERVENTIONS:  -  Assess patient's ability to carry out ADLs; assess patient's baseline for ADL function and identify physical deficits which impact ability to perform ADLs (bathing, care of mouth/teeth, toileting, grooming, dressing, etc )  - Assess/evaluate cause of self-care deficits   - Assess range of motion  - Assess patient's mobility; develop plan if impaired  - Assess patient's need for assistive devices and provide as appropriate  - Encourage maximum independence but intervene and supervise when necessary  - Involve family in performance of ADLs  - Assess for home care needs following discharge   - Consider OT consult to assist with ADL evaluation and planning for discharge  - Provide patient education as appropriate  Outcome: Progressing  Goal: Maintain or return mobility status to optimal level  Description: INTERVENTIONS:  - Assess patient's baseline mobility status (ambulation, transfers, stairs, etc )    - Identify cognitive and physical deficits and behaviors that affect mobility  - Identify mobility aids required to assist with transfers and/or ambulation (gait belt, sit-to-stand, lift, walker, cane, etc )  - [place] fall precautions as indicated by assessment  - Record patient progress and toleration of activity level on Mobility SBAR; progress patient to next Phase/Stage  - Instruct patient to call for assistance with activity based on assessment  - Consider rehabilitation consult to assist with strengthening/weightbearing, etc   Outcome: Progressing     Problem: DISCHARGE PLANNING  Goal: Discharge to home or other facility with appropriate resources  Description: INTERVENTIONS:  - Identify barriers to discharge w/patient and caregiver  - Arrange for needed discharge resources and transportation as appropriate  - Identify discharge learning needs (meds, wound care, etc )  - Arrange for interpretive services to assist at discharge as needed  - Refer to Case Management Department for coordinating discharge planning if the patient needs post-hospital services based on physician/advanced practitioner order or complex needs related to functional status, cognitive ability, or social support system  Outcome: Progressing     Problem: Knowledge Deficit  Goal: Patient/family/caregiver demonstrates understanding of disease process, treatment plan, medications, and discharge instructions  Description: Complete learning assessment and assess knowledge base    Interventions:  - Provide teaching at level of understanding  - Provide teaching via preferred learning methods  Outcome: Progressing     Problem: NEUROSENSORY - ADULT  Goal: Achieves stable or improved neurological status  Description: INTERVENTIONS  - Monitor and report changes in neurological status  - Monitor vital signs such as temperature, blood pressure, glucose, and any other labs ordered   - Initiate measures to prevent increased intracranial pressure  - Monitor for seizure activity and implement precautions if appropriate      Outcome: Progressing  Goal: Remains free of injury related to seizures activity  Description: INTERVENTIONS  - Maintain airway, patient safety  and administer oxygen as ordered  - Monitor patient for seizure activity, document and report duration and description of seizure to physician/advanced practitioner  - If seizure occurs,  ensure patient safety during seizure  - Reorient patient post seizure  - Seizure pads on all 4 side rails  - Instruct patient/family to notify RN of any seizure activity including if an aura is experienced  - Instruct patient/family to call for assistance with activity based on nursing assessment  - Administer anti-seizure medications if ordered    Outcome: Progressing  Goal: Achieves maximal functionality and self care  Description: INTERVENTIONS  - Monitor swallowing and airway patency with patient fatigue and changes in neurological status  - Encourage and assist patient to increase activity and self care     - Encourage visually impaired, hearing impaired and aphasic patients to use assistive/communication devices  Outcome: Progressing     Problem: RESPIRATORY - ADULT  Goal: Achieves optimal ventilation and oxygenation  Description: INTERVENTIONS:  - Assess for changes in respiratory status  - Assess for changes in mentation and behavior  - Position to facilitate oxygenation and minimize respiratory effort  - Oxygen administered by appropriate delivery if ordered  - Initiate smoking cessation education as indicated  - Encourage broncho-pulmonary hygiene including cough, deep breathe, Incentive Spirometry  - Assess the need for suctioning and aspirate as needed  - Assess and instruct to report SOB or any respiratory difficulty  - Respiratory Therapy support as indicated  Outcome: Progressing     Problem: GENITOURINARY - ADULT  Goal: Maintains or returns to baseline urinary function  Description: INTERVENTIONS:  - Assess urinary function  - Encourage oral fluids to ensure adequate hydration if ordered  - Administer IV fluids as ordered to ensure adequate hydration  - Administer ordered medications as needed  - Offer frequent toileting  - Follow urinary retention protocol if ordered  Outcome: Progressing  Goal: Absence of urinary retention  Description: INTERVENTIONS:  - Assess patients ability to void and empty bladder  - Monitor I/O  - Bladder scan as needed  - Discuss with physician/AP medications to alleviate retention as needed  - Discuss catheterization for long term situations as appropriate  Outcome: Progressing  Goal: Urinary catheter remains patent  Description: INTERVENTIONS:  - Assess patency of urinary catheter  - If patient has a chronic doyle, consider changing catheter if non-functioning  - Follow guidelines for intermittent irrigation of non-functioning urinary catheter  Outcome: Progressing     Problem: METABOLIC, FLUID AND ELECTROLYTES - ADULT  Goal: Electrolytes maintained within normal limits  Description: INTERVENTIONS:  - Monitor labs and assess patient for signs and symptoms of electrolyte imbalances  - Administer electrolyte replacement as ordered  - Monitor response to electrolyte replacements, including repeat lab results as appropriate  - Instruct patient on fluid and nutrition as appropriate  Outcome: Progressing  Goal: Fluid balance maintained  Description: INTERVENTIONS:  - Monitor labs   - Monitor I/O and WT  - Instruct patient on fluid and nutrition as appropriate  - Assess for signs & symptoms of volume excess or deficit  Outcome: Progressing  Goal: Glucose maintained within target range  Description: INTERVENTIONS:  - Monitor Blood Glucose as ordered  - Assess for signs and symptoms of hyperglycemia and hypoglycemia  - Administer ordered medications to maintain glucose within target range  - Assess nutritional intake and initiate nutrition service referral as needed  Outcome: Progressing     Problem: SKIN/TISSUE INTEGRITY - ADULT  Goal: Skin integrity remains intact  Description: INTERVENTIONS  - Identify patients at risk for skin breakdown  - Assess and monitor skin integrity  - Assess and monitor nutrition and hydration status  - Monitor labs (i e  albumin)  - Assess for incontinence   - Turn and reposition patient  - Assist with mobility/ambulation  - Relieve pressure over bony prominences  - Avoid friction and shearing  - Provide appropriate hygiene as needed including keeping skin clean and dry  - Evaluate need for skin moisturizer/barrier cream  - Collaborate with interdisciplinary team (i e  Nutrition, Rehabilitation, etc )   - Patient/family teaching  Outcome: Progressing  Goal: Incision(s), wounds(s) or drain site(s) healing without S/S of infection  Description: INTERVENTIONS  - Assess and document risk factors for skin impairment   - Assess and document dressing, incision, wound bed, drain sites and surrounding tissue  - Consider nutrition services referral as needed  - Oral mucous membranes remain intact  - Provide patient/ family education  Outcome: Progressing  Goal: Oral mucous membranes remain intact  Description: INTERVENTIONS  - Assess oral mucosa and hygiene practices  - Implement preventative oral hygiene regimen  - Implement oral medicated treatments as ordered  - Initiate Nutrition services referral as needed  Outcome: Progressing     Problem: MUSCULOSKELETAL - ADULT  Goal: Maintain or return mobility to safest level of function  Description: INTERVENTIONS:  - Assess patient's ability to carry out ADLs; assess patient's baseline for ADL function and identify physical deficits which impact ability to perform ADLs (bathing, care of mouth/teeth, toileting, grooming, dressing, etc )  - Assess/evaluate cause of self-care deficits   - Assess range of motion  - Assess patient's mobility  - Assess patient's need for assistive devices and provide as appropriate  - Encourage maximum independence but intervene and supervise when necessary  - Involve family in performance of ADLs  - Assess for home care needs following discharge   - Consider OT consult to assist with ADL evaluation and planning for discharge  - Provide patient education as appropriate  Outcome: Progressing  Goal: Maintain proper alignment of affected body part  Description: INTERVENTIONS:  - Support, maintain and protect limb and body alignment  - Provide patient/ family with appropriate education  Outcome: Progressing

## 2021-04-13 NOTE — PLAN OF CARE
Problem: PHYSICAL THERAPY ADULT  Goal: Performs mobility at highest level of function for planned discharge setting  See evaluation for individualized goals  Description: Treatment/Interventions: Functional transfer training, LE strengthening/ROM, Therapeutic exercise, Endurance training, Patient/family training, Bed mobility, Gait training, Spoke to nursing  Equipment Recommended: Kia Gilmore       See flowsheet documentation for full assessment, interventions and recommendations  Note: Prognosis: Fair  Problem List: Decreased strength, Decreased range of motion, Decreased endurance, Impaired balance, Decreased mobility, Decreased safety awareness, Pain, Impaired hearing  Assessment: Pt is 82 y o  female seen for PT evaluation on 4/13/2021 s/p admit to [address] on 4/12/2021 w/ Ambulatory dysfunction  PT consulted to assess pt's functional mobility and d/c needs  Order placed for PT eval and tx  PT performed at least 2 patient identifiers during session: Name and wristband  Comorbidities affecting pt's physical performance at time of assessment include: CHF, Afib, Asthma, COPD, hypertension, gait abnormality, chronic anxiety, type2 DM, chronic pain syndrome  Before her admission,pt was independent w/ all functional mobility w/ no AD, ambulates household distances, lives alone in one level home and works part time  Personal factors affecting pt at time of IE include: stairs to enter home, unable to perform dynamic tasks in community, limited home support, anxiety, hearing impairments, impulsivity and limited insight into impairments   Please find objective findings from PT assessment regarding body systems outlined above with impairments and limitations including weakness, decreased ROM, impaired balance, decreased endurance, gait deviations, pain, decreased activity tolerance, decreased functional mobility tolerance, decreased safety awareness, fall risk and SOB upon exertion, as well as mobility assessment (need for min of 2 assist w/ all phases of mobility when usually ambulating independently, tolerance to only 5  feet of ambulation and need for cueing for mobility technique)  The following objective measures performed on IE also reveal limitations: AM-PAC 6-Clicks: 06/22  Pt's clinical presentation is currently unstable/unpredictable seen in pt's presentation of abnormal lab value(s), need for input for task focus and mobility technique, need for min of 2 assist w/ all phases of mobility when usually mobilizing independently, tolerance to only 5 feet of ambulation and moderate back pain pain impacting overall mobility status  Pt to benefit from continued PT tx to address deficits as defined above and maximize level of functional independent mobility and consistency  From PT/mobility standpoint, recommendation at time of d/c would be STR pending progress in order to facilitate return to PLOF  Barriers to Discharge: Decreased caregiver support, Inaccessible home environment([address])           PT - OK to Discharge: No    See flowsheet documentation for full assessment

## 2021-04-13 NOTE — H&P
[facility]  H&P- Jadyn Small 1935, 82 y o  female MRN: [id number]  Unit/Bed#: -01 Encounter: [id number]  Primary Care Provider: LEEROY Duffy   Date and time admitted to hospital: 4/12/2021  6:21 PM    * Ambulatory dysfunction  Assessment & Plan  82year old female admitted due to intractable back pain  Similar to prior due to known compression fractures  - Previously discharged to home since she refused rehab  - PT/OT  - Pain control    Compression fracture of L4 vertebra ([facility])  Assessment & Plan  Similar characteristics to prior    Type 2 diabetes mellitus without complication, without long-term current use of insulin ([facility])  Assessment & Plan  Lab Results   Component Value Date    HGBA1C 12 6 (A) 04/09/2021       No results for input(s): POCGLU in the last 72 hours  Blood Sugar Average: Last 72 hrs:     Sliding scale  Monitor fingersticks  Previously on Lantus 25U HS during previous admission  However due to limited vision she was discharged with metformin 500mg BID and januvia 50mg daily  Will Start her on Lantus 15U HS for now since glucose on admission was at 111  Chronic respiratory failure with hypoxia ([facility])  Assessment & Plan  At 3L baseline  Due to COPD/CHF      Chronic diastolic CHF (congestive heart failure) ([facility])  Assessment & Plan  Wt Readings from Last 3 Encounters:   04/12/21 60 5 kg (133 lb 4 3 oz)   04/09/21 60 8 kg (134 lb)   04/07/21 61 kg (134 lb 7 7 oz)     Not in acute exacerbation        Atrial fibrillation ([facility])  Assessment & Plan  Rate controlled  - Continue metoprolol and apixaban    Gastroesophageal reflux disease without esophagitis  Assessment & Plan  Continue ppi    COPD (chronic obstructive pulmonary disease) ([facility] [address] )  Assessment & Plan  Not in acute exacerbation  - Continue home medications  - On chronic prednisone      VTE Prophylaxis: Apixaban (Eliquis)  / sequential compression device   Code Status: full code    Anticipated Length of Stay:  Patient will be admitted on an Observation basis with an anticipated length of stay of  < 2 midnights  Justification for Hospital Stay: intractable back pain, iv pain control, pt/ot eval    Total Time for Visit, including Counseling / Coordination of Care: 75 minutes  Greater than 50% of this total time spent on direct patient counseling and coordination of care  Chief Complaint:   Intractable back pain    History of Present Illness:    Jessica Colin is a 82 y o  female who presents with intractable back pain  Her intractable back pain is due to her chronic compression fractures  She was recently admitted for the same issue  She states that there was no changes to her pain characteristics  She was previously offered rehab, but declined  Besides her intractable back pain, she denies any new acute issues  She denies any saddle anesthesia, bowel or urinary incontinence  Review of Systems:    Review of Systems   Constitutional: Negative for chills, fatigue and fever  HENT: Negative for ear pain  Eyes: Negative for pain  Respiratory: Negative for cough, chest tightness, shortness of breath and wheezing  Cardiovascular: Negative for chest pain and palpitations  Gastrointestinal: Negative for abdominal pain, diarrhea and nausea  Endocrine: Negative for polydipsia  Genitourinary: Negative for dysuria  Musculoskeletal: Positive for arthralgias, back pain and gait problem  Skin: Negative for color change and pallor  Neurological: Negative for seizures, syncope, weakness and headaches  Psychiatric/Behavioral: Negative for agitation and confusion         Past Medical and Surgical History:     Past Medical History:   Diagnosis Date    Allergies     Asthma     Chronic diastolic CHF (congestive heart failure) (HCC)     Colitis     Colon polyp     COPD (chronic obstructive pulmonary disease)     Diverticulosis     DJD (degenerative joint disease)     Gait abnormality     Hypertension     Paroxysmal atrial fibrillation [facility])        Past Surgical History:   Procedure Laterality Date    BLADDER SURGERY      COLON SURGERY      COLONOSCOPY      COLONOSCOPY W/ POLYPECTOMY N/A 1/21/2019    Procedure: COLONOSCOPY W/ POLYPECTOMY;  Surgeon: Emiliano Scott MD;  Location: [address] [place] [address] GI LAB; Service: General    FL GUIDED NEEDLE PLAC BX/ASP/INJ  9/2/2020    FL GUIDED NEEDLE PLAC BX/ASP/INJ  1/21/2021    GASTROJEJUNOSTOMY W/ JEJUNOSTOMY TUBE N/A 2/3/2020    Procedure: Antrectomy with bilroth II Anastomosis; Surgeon: Gemma Meza MD;  Location: [facility];  Service: Johan Golden JOINT Right 9/2/2020    Procedure: BLOCK / INJECTION SACROILIAC;  Surgeon: Dawood Avila MD;  Location: [facility];  Service: Pain Management     NC INJECTION,SACROILIAC JOINT Right 1/21/2021    Procedure: RIGHT SACROILIAC JOINT INJECTION;  Surgeon: Dawood Avila MD;  Location: [facility];  Service: Pain Management     SMALL INTESTINE SURGERY  03/2020    with partial gastrectomy       Meds/Allergies:    Prior to Admission medications    Medication Sig Start Date End Date Taking?  Authorizing Provider   alendronate (FOSAMAX) 70 mg tablet take 1 tablet by mouth every 7 days 1/13/21   Urbano Potts DO   apixaban (ELIQUIS) 5 mg Take 1 tablet (5 mg total) by mouth 2 (two) times a day 3/2/21   Urbano Potts DO   Artificial Tear Solution (GENTEAL TEARS) 0 1-0 2-0 3 % SOLN Administer 1 drop to both eyes 3 (three) times a day 2/14/20   Historical Provider, MD   budesonide-formoterol (Symbicort) 160-4 5 mcg/act inhaler Inhale 2 puffs 2 (two) times a day    Historical Provider, MD   Calcium Carb-Cholecalciferol (CALCIUM 1000 + D PO) Take 1,000 mg by mouth daily 3/28/18   Historical Provider, MD   fluticasone-umeclidinium-vilanterol (Trelegy Ellipta) 214-41 5-84 MCG/INH inhaler Inhale 1 puff daily Rinse mouth after use  8/11/20   Urbano Potts DO furosemide (LASIX) 20 mg tablet take 2 tablets by mouth twice a day 3/1/21   Doyal Grams, DO   gabapentin (NEURONTIN) 300 mg capsule Take 1 capsule (300 mg total) by mouth 3 (three) times a day 4/7/21 5/7/21  LEEROY Calvert   HYDROcodone-acetaminophen (NORCO) 5-325 mg per tablet Take 1 tablet by mouth every 6 (six) hours as needed for painMax Daily Amount: 4 tablets 4/9/21   LEEROY Vo   hydrOXYzine HCL (ATARAX) 25 mg tablet Take 1 tablet (25 mg total) by mouth every 6 (six) hours as needed for anxiety 2/16/21   LEEROY Yi   ipratropium-albuterol (DUO-NEB) 0 5-2 5 mg/3 mL nebulizer solution Take 1 vial (3 mL total) by nebulization every 6 (six) hours while awake 11/12/19   Eldon Villarreal PA-C   iron polysaccharides (Ferrex 150) 150 mg capsule Take 1 capsule (150 mg total) by mouth daily 3/2/21   LEEROY Yi   lidocaine (LIDODERM) 5 % Apply 1 patch topically daily Remove & Discard patch within 12 hours or as directed by MD 4/8/21 5/8/21  LEEROY Calvert   menthol-methyl salicylate (BENGAY) 05-41 % cream Apply topically 4 (four) times a day as needed (back pain) 4/7/21   LEEROY Calvert   metFORMIN (GLUCOPHAGE) 500 mg tablet Take 1 tablet (500 mg total) by mouth 2 (two) times a day with meals 4/9/21 5/9/21  LEEROY Vo   methocarbamol (ROBAXIN) 500 mg tablet Take 1 tablet (500 mg total) by mouth every 8 (eight) hours for 7 days 4/7/21 4/14/21  LEEROY Calvert   metoprolol tartrate (LOPRESSOR) 25 mg tablet take 1/2 tablet by mouth every 12 hours 3/11/21   Doyal Grams, DO   montelukast (SINGULAIR) 10 mg tablet Take 1 tablet (10 mg total) by mouth daily at bedtime 3/2/21   LEEROY Braun   OXYGEN-HELIUM IN [place]    Historical Provider, MD   pantoprazole (PROTONIX) 40 mg tablet take 1 tablet by mouth twice a day 3/11/21   Doyal Grams, DO   polyethylene glycol (MIRALAX) 17 g packet Take 17 g by mouth daily 1/3/21 2/23/21 Sheryl Rodriguez PA-C   potassium chloride (K-DUR,KLOR-CON) 20 mEq tablet Take 2 tablets (40 mEq total) by mouth daily 1/3/21 2/23/21  Sheryl Rodriguez PA-C   predniSONE 5 mg tablet Take 1 tablet (5 mg total) by mouth 2 (two) times a day with meals Twice daily 3/2/21 8/29/21  LEEROY Cain   psyllium (METAMUCIL) packet Take 1 packet by mouth 3 (three) times a day 19   Elysia Estrada PA-C   simethicone (MYLICON) 80 mg chewable tablet Chew 1 tablet (80 mg total) every 6 (six) hours as needed for flatulence 20   LEEROY Devries   sitaGLIPtin (JANUVIA) 50 mg tablet Take 1 tablet (50 mg total) by mouth daily 21  LEEROY Silva   theophylline (CORTNEY-24) 200 MG 24 hr capsule Take 1 capsule (200 mg total) by mouth daily 20   Nita Saint, DO   iron polysaccharides (FERREX) 150 mg capsule Take 1 capsule (150 mg total) by mouth 2 (two) times a day 20   Nita Saint, DO     I have reviewed home medications using allscripts  Allergies: Allergies   Allergen Reactions    Bee Venom Shortness Of Breath    Fluticasone      Per pt  error       Social History:     Marital Status:     Substance Use History:   Social History     Substance and Sexual Activity   Alcohol Use Never    Alcohol/week: 0 0 standard drinks    Frequency: Never    Drinks per session: Patient refused    Binge frequency: Never     Social History     Tobacco Use   Smoking Status Former Smoker    Quit date: 2013    Years since quittin 3   Smokeless Tobacco Never Used     Social History     Substance and Sexual Activity   Drug Use Never       Family History:    Family History   Problem Relation Age of Onset    Heart disease Mother        Physical Exam:     Vitals:   Blood Pressure: 120/70 (21)  Pulse: 88 (21)  Temperature: 98 3 °F (36 8 °C) (21)  Temp Source: Temporal (21 3712)  Respirations: 22 (21)  Height: 5' (152 4 cm) (04/12/21 2014)  Weight - Scale: 60 5 kg (133 lb 4 3 oz) (04/12/21 2014)  SpO2: 97 % (04/12/21 2014)    Physical Exam  Vitals signs reviewed  Constitutional:       General: She is not in acute distress  HENT:      Head: Normocephalic  Nose: Nose normal       Mouth/Throat:      Mouth: Mucous membranes are moist    Eyes:      General: No scleral icterus  Cardiovascular:      Rate and Rhythm: Normal rate  Rhythm irregular  Pulmonary:      Effort: Pulmonary effort is normal  No respiratory distress  Abdominal:      General: There is no distension  Palpations: Abdomen is soft  Tenderness: There is no abdominal tenderness  Musculoskeletal:         General: Tenderness (lumbar) present  Skin:     General: Skin is warm  Neurological:      Mental Status: She is alert  Psychiatric:         Mood and Affect: Mood normal          Behavior: Behavior normal        Additional Data:     Lab Results: I have personally reviewed pertinent reports  Results from last 7 days   Lab Units 04/12/21  1928   WBC Thousand/uL 5 80   HEMOGLOBIN g/dL 10 4*   HEMATOCRIT % 33 4*   PLATELETS Thousands/uL 243   NEUTROS PCT % 78*   LYMPHS PCT % 13*   MONOS PCT % 8   EOS PCT % 1     Results from last 7 days   Lab Units 04/12/21  1928   SODIUM mmol/L 136   POTASSIUM mmol/L 4 1   CHLORIDE mmol/L 96*   CO2 mmol/L 29   BUN mg/dL 18   CREATININE mg/dL 0 70   ANION GAP mmol/L 11   CALCIUM mg/dL 9 7   GLUCOSE RANDOM mg/dL 111*         Results from last 7 days   Lab Units 04/07/21  0526 04/06/21  1932 04/06/21  1544 04/06/21  1059 04/06/21  0640   POC GLUCOSE mg/dl 152* 160* 260* 196* 186*     Results from last 7 days   Lab Units 04/09/21  1216   HEMOGLOBIN A1C  12 6*           Imaging: I have personally reviewed pertinent reports        No orders to display       EKG, Pathology, and Other Studies Reviewed on Admission:   · EKG: None for this admission    [facility] Yes     ** Please Note: This note has been constructed using a voice recognition system   **

## 2021-04-13 NOTE — RESPIRATORY THERAPY NOTE
RT Protocol Note  Marj Lindquist 82 y o  female MRN: [id number]  Unit/Bed#: -01 Encounter: [id number]    Assessment    Principal Problem:    Ambulatory dysfunction  Active Problems:    COPD (chronic obstructive pulmonary disease) (HCC)    Gastroesophageal reflux disease without esophagitis    Atrial fibrillation (HCC)    Compression fracture of L4 vertebra (HCC)    Chronic diastolic CHF (congestive heart failure) (HCC)    Chronic respiratory failure with hypoxia (HCC)    Type 2 diabetes mellitus without complication, without long-term current use of insulin (HCC)      Home Pulmonary Medications:    Home Devices/Therapy: (P) Home O2(pt  wear 3lpm @home) Tx's ordered by hospitalist     Past Medical History:   Diagnosis Date    Allergies     Asthma     Chronic diastolic CHF (congestive heart failure) (HCC)     Colitis     Colon polyp     COPD (chronic obstructive pulmonary disease)     Diverticulosis     DJD (degenerative joint disease)     Gait abnormality     Hypertension     Paroxysmal atrial fibrillation ([facility])      Social History     Socioeconomic History    Marital status:       Spouse name: None    Number of children: None    Years of education: None    Highest education level: None   Occupational History    Occupation: retired    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   Social Needs    Financial resource strain: None    Food insecurity     Worry: None     Inability: None    Transportation needs     Medical: None     Non-medical: None   Tobacco Use    Smoking status: Former Smoker     Quit date: 2013     Years since quittin 3    Smokeless tobacco: Never Used   Substance and Sexual Activity    Alcohol use: Never     Alcohol/week: 0 0 standard drinks     Frequency: Never     Drinks per session: Patient refused     Binge frequency: Never    Drug use: Never    Sexual activity: Not Currently   Lifestyle    Physical activity     Days per week: None     Minutes per session: None    Stress: None   Relationships    Social connections     Talks on phone: None     Gets together: None     Attends Sabianism service: None     Active member of club or organization: None     Attends meetings of clubs or organizations: None     Relationship status: None    Intimate partner violence     Fear of current or ex partner: None     Emotionally abused: None     Physically abused: None     Forced sexual activity: None   Other Topics Concern    None   Social History Narrative    Retired        Daily caffeine use- 2 cups of coffee       Subjective Pulmonary HX : COPD  Pt  States she takes breathing tx's Q6 hr  Around the clock @ home  Pt  Asked nursing to call for tx and she was asleep and without distress  Tx given by R T          Objective    Physical Exam:   Assessment Type: (P) During-treatment  General Appearance: (P) Sleeping  Respiratory Pattern: (P) Normal  Chest Assessment: (P) Chest expansion symmetrical  Bilateral Breath Sounds: (P) Clear, Diminished    Vitals:  Blood pressure 117/60, pulse 70, temperature 98 5 °F (36 9 °C), temperature source Temporal, resp  rate 19, height 5' (1 524 m), weight 60 5 kg (133 lb 4 3 oz), SpO2 97 %, not currently breastfeeding  Imaging and other studies: I have personally reviewed pertinent reports              Plan    Respiratory Plan: (P) Home Bronchodilator Patient pathway        Resp Comments: protocol triaged house emergency/ 2 back to back vents in E R  pt  was given stat tx requested by nursing

## 2021-04-13 NOTE — ASSESSMENT & PLAN NOTE
Lab Results   Component Value Date    HGBA1C 12 6 (A) 04/09/2021       No results for input(s): POCGLU in the last 72 hours  Blood Sugar Average: Last 72 hrs:     Sliding scale  Monitor fingersticks  Previously on Lantus 25U HS during previous admission  However due to limited vision she was discharged with metformin 500mg BID and januvia 50mg daily  Will Start her on Lantus 15U HS for now since glucose on admission was at 111

## 2021-04-13 NOTE — ASSESSMENT & PLAN NOTE
Wt Readings from Last 3 Encounters:   04/12/21 60 5 kg (133 lb 4 3 oz)   04/09/21 60 8 kg (134 lb)   04/07/21 61 kg (134 lb 7 7 oz)     Not in acute exacerbation

## 2021-04-13 NOTE — PROGRESS NOTES
[address] [facility]  Progress Note - Yajaira Mosher 1935, 82 y o  female MRN: [id number]  Unit/Bed#: -01 Encounter: [id number]  Primary Care Provider: LEEROY Renteria   Date and time admitted to hospital: 4/12/2021  6:21 PM    * Ambulatory dysfunction  Assessment & Plan  82year old female admitted due to intractable back pain  Similar to prior due to known compression fractures  Still with intractable pain, unsafe for discharge at this time   - PT recommending rehab  Previously discharged to home with [facility] since she refused rehab  Still undecided about rehab  Unsafe for discharge to home at this time  - Continue IV Pain control    Compression fracture of L4 vertebra ([facility])  Assessment & Plan  Similar characteristics to prior    Type 2 diabetes mellitus without complication, without long-term current use of insulin [facility])  Assessment & Plan  Lab Results   Component Value Date    HGBA1C 12 6 (A) 04/09/2021       Recent Labs     04/12/21  2119 04/13/21  0558 04/13/21  1053   POCGLU 109 100 134       Blood Sugar Average: Last 72 hrs:  (P) 865 [id number]     Sliding scale  Monitor fingersticks  Previously on Lantus 25U HS during previous admission  However due to limited vision she was discharged with metformin 500mg BID and januvia 50mg daily  Will maintain her on Lantus 8U HS for now since glucose levels are acceptable  Chronic respiratory failure with hypoxia (HCC)  Assessment & Plan  At 3L baseline  Due to COPD/CHF      Chronic diastolic CHF (congestive heart failure) ([facility])  Assessment & Plan  Wt Readings from Last 3 Encounters:   04/12/21 60 5 kg (133 lb 4 3 oz)   04/09/21 60 8 kg (134 lb)   04/07/21 61 kg (134 lb 7 7 oz)     Not in acute exacerbation        Atrial fibrillation (HCC)  Assessment & Plan  Rate controlled  - Continue metoprolol and apixaban    Gastroesophageal reflux disease without esophagitis  Assessment & Plan  Continue ppi    COPD (chronic obstructive pulmonary disease) (HCC)  Assessment & Plan  Not in acute exacerbation  - Continue home medications  - On chronic prednisone    VTE Pharmacologic Prophylaxis:   Pharmacologic: Apixaban (Eliquis)  Mechanical VTE Prophylaxis in Place: Yes    Patient Centered Rounds: I have performed bedside rounds with nursing staff today  Discussions with Specialists or Other Care Team Provider: Nursing    Education and Discussions with Family / Patient: patient    Time Spent for Care: 30 minutes  More than 50% of total time spent on counseling and coordination of care as described above  Current Length of Stay: 0 day(s)    Current Patient Status: Inpatient   Certification Statement: The patient will continue to require additional inpatient hospital stay due to iv pain medications, unsafe discharge to home at this time, pt/ot reeval    Discharge Plan: active    Code Status: Level 1 - Full Code      Subjective:   Patient seen and examined at bedside  Still complaining of pain  States that it was after her 3rd p r n  Medication that got the job done  She did however admit that the 1st 2 might have been due to IV infiltration  She is still on the fence whether she would like to go to rehab  Objective:     Vitals:   Temp (24hrs), Av 2 °F (36 8 °C), Min:97 4 °F (36 3 °C), Max:98 6 °F (37 °C)    Temp:  [97 4 °F (36 3 °C)-98 6 °F (37 °C)] 98 6 °F (37 °C)  HR:  [] 61  Resp:  [18-22] 18  BP: (104-120)/(57-70) 104/57  SpO2:  [94 %-97 %] 96 %  Body mass index is 26 03 kg/m²  Input and Output Summary (last 24 hours): Intake/Output Summary (Last 24 hours) at 2021 1211  Last data filed at 2021 1201  Gross per 24 hour   Intake 360 ml   Output 400 ml   Net -40 ml       Physical Exam:     Physical Exam  Vitals signs reviewed  Constitutional:       General: She is not in acute distress  Appearance: She is not ill-appearing  HENT:      Head: Normocephalic        Nose: Nose normal  Mouth/Throat:      Mouth: Mucous membranes are moist    Eyes:      General: No scleral icterus  Extraocular Movements: Extraocular movements intact  Cardiovascular:      Rate and Rhythm: Normal rate  Pulmonary:      Effort: Pulmonary effort is normal  No respiratory distress  Abdominal:      Palpations: Abdomen is soft  Tenderness: There is no abdominal tenderness  Musculoskeletal:      Comments: Spinal tenderness   Skin:     General: Skin is warm  Neurological:      Mental Status: She is alert  Mental status is at baseline  Psychiatric:      Comments: irritable       Additional Data:     Labs:    Results from last 7 days   Lab Units 04/12/21  1928   WBC Thousand/uL 5 80   HEMOGLOBIN g/dL 10 4*   HEMATOCRIT % 33 4*   PLATELETS Thousands/uL 243   NEUTROS PCT % 78*   LYMPHS PCT % 13*   MONOS PCT % 8   EOS PCT % 1     Results from last 7 days   Lab Units 04/13/21  0900   SODIUM mmol/L 135   POTASSIUM mmol/L 3 0*   CHLORIDE mmol/L 95*   CO2 mmol/L 29   BUN mg/dL 11   CREATININE mg/dL 0 57*   ANION GAP mmol/L 11   CALCIUM mg/dL 8 4*   GLUCOSE RANDOM mg/dL 121*         Results from last 7 days   Lab Units 04/13/21  1053 04/13/21  0558 04/12/21  2119 04/07/21  0526 04/06/21  1932 04/06/21  1544   POC GLUCOSE mg/dl 134 100 109 152* 160* 260*     Results from last 7 days   Lab Units 04/09/21  1216   HEMOGLOBIN A1C  12 6*               * I Have Reviewed All Lab Data Listed Above  * Additional Pertinent Lab Tests Reviewed:  Jacinda 66 Admission Reviewed    Imaging:    Imaging Reports Reviewed Today Include: no new imaging    Recent Cultures (last 7 days):           Last 24 Hours Medication List:   Current Facility-Administered Medications   Medication Dose Route Frequency Provider Last Rate    acetaminophen  650 mg Oral Q6H PRN Ulyess Course, MD      albuterol  2 5 mg Nebulization Q4H PRN Ulyess Course, MD      apixaban  2 5 mg Oral BID Kaylah Miles PA-C      budesonide-formoterol 2 puff Inhalation BID Sayra Lowry MD      furosemide  40 mg Oral BID Sayra Lowry MD      gabapentin  300 mg Oral TID Sayra Lowry MD      glycerin-hypromellose-  2 drop Both Eyes Q3H PRN Sayra Lowry MD      HYDROmorphone  0 5 mg Intravenous Q4H PRN Glenn Motley PA-C      insulin glargine  8 Units Subcutaneous HS Glenn Motley PA-C      levalbuterol  1 25 mg Nebulization TID Sayra Lowry MD      And    sodium chloride  3 mL Nebulization TID Sayra Lowry MD      lidocaine  1 patch Topical Daily Sayra Lowry MD      metFORMIN  500 mg Oral BID With Meals Sayra Lowry MD      methocarbamol  500 mg Oral Q6H PRN Glenn Motley PA-C      metoprolol tartrate  12 5 mg Oral Q12H Sayra Lowry MD      montelukast  10 mg Oral HS Sayra Lowry MD      oxyCODONE-acetaminophen  1 tablet Oral Q4H PRN Sayra Lowry MD      pantoprazole  40 mg Oral BID Sayra Lowry MD      polyethylene glycol  17 g Oral Daily Sayra Lowry MD      predniSONE  5 mg Oral BID With Meals Sayra Lowry MD      psyllium  1 packet Oral TID Sayra Lowry MD      theophylline  200 mg Oral Daily Sayra Lowry MD      tiotropium  18 mcg Inhalation Daily Glenn Motley PA-C          Today, Patient Was Seen By: Azeb Lantigua MD    ** Please Note: Dictation voice to text software may have been used in the creation of this document   **

## 2021-04-13 NOTE — PLAN OF CARE
Admit   Problem: Potential for Falls  Goal: Patient will remain free of falls  Description: INTERVENTIONS:  - Assess patient frequently for physical needs  -  Identify cognitive and physical deficits and behaviors that affect risk of falls    -  [place] fall precautions as indicated by assessment   - Educate patient/family on patient safety including physical limitations  - Instruct patient to call for assistance with activity based on assessment  - Modify environment to reduce risk of injury  - Consider OT/PT consult to assist with strengthening/mobility  Outcome: Not Progressing     Problem: Prexisting or High Potential for Compromised Skin Integrity  Goal: Skin integrity is maintained or improved  Description: INTERVENTIONS:  - Identify patients at risk for skin breakdown  - Assess and monitor skin integrity  - Assess and monitor nutrition and hydration status  - Monitor labs   - Assess for incontinence   - Turn and reposition patient  - Assist with mobility/ambulation  - Relieve pressure over bony prominences  - Avoid friction and shearing  - Provide appropriate hygiene as needed including keeping skin clean and dry  - Evaluate need for skin moisturizer/barrier cream  - Collaborate with interdisciplinary team   - Patient/family teaching  - Consider wound care consult   Outcome: Not Progressing     Problem: PAIN - ADULT  Goal: Verbalizes/displays adequate comfort level or baseline comfort level  Description: Interventions:  - Encourage patient to monitor pain and request assistance  - Assess pain using appropriate pain scale  - Administer analgesics based on type and severity of pain and evaluate response  - Implement non-pharmacological measures as appropriate and evaluate response  - Consider cultural and social influences on pain and pain management  - Notify physician/advanced practitioner if interventions unsuccessful or patient reports new pain  Outcome: Not Progressing     Problem: INFECTION - ADULT  Goal: Absence or prevention of progression during hospitalization  Description: INTERVENTIONS:  - Assess and monitor for signs and symptoms of infection  - Monitor lab/diagnostic results  - Monitor all insertion sites, i e  indwelling lines, tubes, and drains  - Monitor endotracheal if appropriate and nasal secretions for changes in amount and color  - [place] appropriate cooling/warming therapies per order  - Administer medications as ordered  - Instruct and encourage patient and family to use good hand hygiene technique  - Identify and instruct in appropriate isolation precautions for identified infection/condition  Outcome: Not Progressing  Goal: Absence of fever/infection during neutropenic period  Description: INTERVENTIONS:  - Monitor WBC    Outcome: Not Progressing     Problem: SAFETY ADULT  Goal: Patient will remain free of falls  Description: INTERVENTIONS:  - Assess patient frequently for physical needs  -  Identify cognitive and physical deficits and behaviors that affect risk of falls    -  [place] fall precautions as indicated by assessment   - Educate patient/family on patient safety including physical limitations  - Instruct patient to call for assistance with activity based on assessment  - Modify environment to reduce risk of injury  - Consider OT/PT consult to assist with strengthening/mobility  Outcome: Not Progressing  Goal: Maintain or return to baseline ADL function  Description: INTERVENTIONS:  -  Assess patient's ability to carry out ADLs; assess patient's baseline for ADL function and identify physical deficits which impact ability to perform ADLs (bathing, care of mouth/teeth, toileting, grooming, dressing, etc )  - Assess/evaluate cause of self-care deficits   - Assess range of motion  - Assess patient's mobility; develop plan if impaired  - Assess patient's need for assistive devices and provide as appropriate  - Encourage maximum independence but intervene and supervise when necessary  - Involve family in performance of ADLs  - Assess for home care needs following discharge   - Consider OT consult to assist with ADL evaluation and planning for discharge  - Provide patient education as appropriate  Outcome: Not Progressing  Goal: Maintain or return mobility status to optimal level  Description: INTERVENTIONS:  - Assess patient's baseline mobility status (ambulation, transfers, stairs, etc )    - Identify cognitive and physical deficits and behaviors that affect mobility  - Identify mobility aids required to assist with transfers and/or ambulation (gait belt, sit-to-stand, lift, walker, cane, etc )  - [place] fall precautions as indicated by assessment  - Record patient progress and toleration of activity level on Mobility SBAR; progress patient to next Phase/Stage  - Instruct patient to call for assistance with activity based on assessment  - Consider rehabilitation consult to assist with strengthening/weightbearing, etc   Outcome: Not Progressing     Problem: DISCHARGE PLANNING  Goal: Discharge to home or other facility with appropriate resources  Description: INTERVENTIONS:  - Identify barriers to discharge w/patient and caregiver  - Arrange for needed discharge resources and transportation as appropriate  - Identify discharge learning needs (meds, wound care, etc )  - Arrange for interpretive services to assist at discharge as needed  - Refer to Case Management Department for coordinating discharge planning if the patient needs post-hospital services based on physician/advanced practitioner order or complex needs related to functional status, cognitive ability, or social support system  Outcome: Not Progressing     Problem: Knowledge Deficit  Goal: Patient/family/caregiver demonstrates understanding of disease process, treatment plan, medications, and discharge instructions  Description: Complete learning assessment and assess knowledge base    Interventions:  - Provide teaching at level of understanding  - Provide teaching via preferred learning methods  Outcome: Not Progressing     Problem: NEUROSENSORY - ADULT  Goal: Achieves stable or improved neurological status  Description: INTERVENTIONS  - Monitor and report changes in neurological status  - Monitor vital signs such as temperature, blood pressure, glucose, and any other labs ordered   - Initiate measures to prevent increased intracranial pressure  - Monitor for seizure activity and implement precautions if appropriate      Outcome: Not Progressing  Goal: Remains free of injury related to seizures activity  Description: INTERVENTIONS  - Maintain airway, patient safety  and administer oxygen as ordered  - Monitor patient for seizure activity, document and report duration and description of seizure to physician/advanced practitioner  - If seizure occurs,  ensure patient safety during seizure  - Reorient patient post seizure  - Seizure pads on all 4 side rails  - Instruct patient/family to notify RN of any seizure activity including if an aura is experienced  - Instruct patient/family to call for assistance with activity based on nursing assessment  - Administer anti-seizure medications if ordered    Outcome: Not Progressing  Goal: Achieves maximal functionality and self care  Description: INTERVENTIONS  - Monitor swallowing and airway patency with patient fatigue and changes in neurological status  - Encourage and assist patient to increase activity and self care     - Encourage visually impaired, hearing impaired and aphasic patients to use assistive/communication devices  Outcome: Not Progressing     Problem: RESPIRATORY - ADULT  Goal: Achieves optimal ventilation and oxygenation  Description: INTERVENTIONS:  - Assess for changes in respiratory status  - Assess for changes in mentation and behavior  - Position to facilitate oxygenation and minimize respiratory effort  - Oxygen administered by appropriate delivery if ordered  - Initiate smoking cessation education as indicated  - Encourage broncho-pulmonary hygiene including cough, deep breathe, Incentive Spirometry  - Assess the need for suctioning and aspirate as needed  - Assess and instruct to report SOB or any respiratory difficulty  - Respiratory Therapy support as indicated  Outcome: Not Progressing     Problem: GENITOURINARY - ADULT  Goal: Maintains or returns to baseline urinary function  Description: INTERVENTIONS:  - Assess urinary function  - Encourage oral fluids to ensure adequate hydration if ordered  - Administer IV fluids as ordered to ensure adequate hydration  - Administer ordered medications as needed  - Offer frequent toileting  - Follow urinary retention protocol if ordered  Outcome: Not Progressing  Goal: Absence of urinary retention  Description: INTERVENTIONS:  - Assess patients ability to void and empty bladder  - Monitor I/O  - Bladder scan as needed  - Discuss with physician/AP medications to alleviate retention as needed  - Discuss catheterization for long term situations as appropriate  Outcome: Not Progressing  Goal: Urinary catheter remains patent  Description: INTERVENTIONS:  - Assess patency of urinary catheter  - If patient has a chronic doyle, consider changing catheter if non-functioning  - Follow guidelines for intermittent irrigation of non-functioning urinary catheter  Outcome: Not Progressing     Problem: METABOLIC, FLUID AND ELECTROLYTES - ADULT  Goal: Electrolytes maintained within normal limits  Description: INTERVENTIONS:  - Monitor labs and assess patient for signs and symptoms of electrolyte imbalances  - Administer electrolyte replacement as ordered  - Monitor response to electrolyte replacements, including repeat lab results as appropriate  - Instruct patient on fluid and nutrition as appropriate  Outcome: Not Progressing  Goal: Fluid balance maintained  Description: INTERVENTIONS:  - Monitor labs   - Monitor I/O and WT  - Instruct patient on fluid and nutrition as appropriate  - Assess for signs & symptoms of volume excess or deficit  Outcome: Not Progressing  Goal: Glucose maintained within target range  Description: INTERVENTIONS:  - Monitor Blood Glucose as ordered  - Assess for signs and symptoms of hyperglycemia and hypoglycemia  - Administer ordered medications to maintain glucose within target range  - Assess nutritional intake and initiate nutrition service referral as needed  Outcome: Not Progressing     Problem: SKIN/TISSUE INTEGRITY - ADULT  Goal: Skin integrity remains intact  Description: INTERVENTIONS  - Identify patients at risk for skin breakdown  - Assess and monitor skin integrity  - Assess and monitor nutrition and hydration status  - Monitor labs (i e  albumin)  - Assess for incontinence   - Turn and reposition patient  - Assist with mobility/ambulation  - Relieve pressure over bony prominences  - Avoid friction and shearing  - Provide appropriate hygiene as needed including keeping skin clean and dry  - Evaluate need for skin moisturizer/barrier cream  - Collaborate with interdisciplinary team (i e  Nutrition, Rehabilitation, etc )   - Patient/family teaching  Outcome: Not Progressing  Goal: Incision(s), wounds(s) or drain site(s) healing without S/S of infection  Description: INTERVENTIONS  - Assess and document risk factors for skin impairment   - Assess and document dressing, incision, wound bed, drain sites and surrounding tissue  - Consider nutrition services referral as needed  - Oral mucous membranes remain intact  - Provide patient/ family education  Outcome: Not Progressing  Goal: Oral mucous membranes remain intact  Description: INTERVENTIONS  - Assess oral mucosa and hygiene practices  - Implement preventative oral hygiene regimen  - Implement oral medicated treatments as ordered  - Initiate Nutrition services referral as needed  Outcome: Not Progressing     Problem: MUSCULOSKELETAL - ADULT  Goal: Maintain or return mobility to safest level of function  Description: INTERVENTIONS:  - Assess patient's ability to carry out ADLs; assess patient's baseline for ADL function and identify physical deficits which impact ability to perform ADLs (bathing, care of mouth/teeth, toileting, grooming, dressing, etc )  - Assess/evaluate cause of self-care deficits   - Assess range of motion  - Assess patient's mobility  - Assess patient's need for assistive devices and provide as appropriate  - Encourage maximum independence but intervene and supervise when necessary  - Involve family in performance of ADLs  - Assess for home care needs following discharge   - Consider OT consult to assist with ADL evaluation and planning for discharge  - Provide patient education as appropriate  Outcome: Not Progressing  Goal: Maintain proper alignment of affected body part  Description: INTERVENTIONS:  - Support, maintain and protect limb and body alignment  - Provide patient/ family with appropriate education  Outcome: Not Progressing

## 2021-04-13 NOTE — NURSING NOTE
Pt states her neighbor Vasyl Graham takes care of pt's medications  Pt states she does not know what she takes and what she took today  Pt states, "I take a heart pill, that's all I know " Attempted to call Vasyl Graham with number provided by pt ([phone]) with no answer   Will attempt in AM to contact Thalia to complete medication list

## 2021-04-13 NOTE — OCCUPATIONAL THERAPY NOTE
Occupational Therapy Evaluation      Margarita Castaneda    4/13/2021    Principal Problem:    Ambulatory dysfunction  Active Problems:    COPD (chronic obstructive pulmonary disease) (HCC)    Gastroesophageal reflux disease without esophagitis    Atrial fibrillation (HCC)    Compression fracture of L4 vertebra (HCC)    Chronic diastolic CHF (congestive heart failure) (HCC)    Chronic respiratory failure with hypoxia (HCC)    Type 2 diabetes mellitus without complication, without long-term current use of insulin (HCC)      Past Medical History:   Diagnosis Date    Allergies     Asthma     Chronic diastolic CHF (congestive heart failure) (HCC)     Colitis     Colon polyp     COPD (chronic obstructive pulmonary disease)     Diverticulosis     DJD (degenerative joint disease)     Gait abnormality     Hypertension     Paroxysmal atrial fibrillation ([facility] [address] )        Past Surgical History:   Procedure Laterality Date    BLADDER SURGERY      COLON SURGERY      COLONOSCOPY      COLONOSCOPY W/ POLYPECTOMY N/A 1/21/2019    Procedure: COLONOSCOPY W/ POLYPECTOMY;  Surgeon: Milagros Maharaj MD;  Location: [facility] GI LAB; Service: General    FL GUIDED NEEDLE PLAC BX/ASP/INJ  9/2/2020    FL GUIDED NEEDLE PLAC BX/ASP/INJ  1/21/2021    GASTROJEJUNOSTOMY W/ JEJUNOSTOMY TUBE N/A 2/3/2020    Procedure: Antrectomy with bilroth II Anastomosis;   Surgeon: Jacklyn Montgomery MD;  Location:  MAIN OR;  Service: General   Bry Stacks JOINT Right 9/2/2020    Procedure: BLOCK / INJECTION SACROILIAC;  Surgeon: Sruthi Aguiar MD;  Location: [facility];  Service: Pain Management     WY INJECTION,SACROILIAC JOINT Right 1/21/2021    Procedure: RIGHT SACROILIAC JOINT INJECTION;  Surgeon: Sruthi Aguiar MD;  Location: [facility];  Service: Pain Management     SMALL INTESTINE SURGERY  03/2020    with partial gastrectomy        04/13/21 0828   OT Last Visit   OT Visit Date 04/13/21   Note Type   Note type Evaluation Restrictions/Precautions   Weight Bearing Precautions Per Order No   Other Precautions O2;Fall Risk;Pain; Chair Alarm; Bed Alarm  (3L of O2 - uses at baseline)   Pain Assessment   Pain Assessment Tool 0-10   Pain Score 5   Pain Location/Orientation Orientation: Lower; Location: Back   Pain Onset/Description Onset: Ongoing   Effect of Pain on Daily Activities yes   Patient's Stated Pain Goal No pain   Hospital Pain Intervention(s) Medication (See MAR); Ambulation/increased activity;Repositioned   Multiple Pain Sites No   Home Living   Type of [address] One level;Performs ADLs on one level;Stairs to enter with rails  (3 SAVANNAH)   Bathroom Shower/Tub Tub/shower unit   Bathroom Toilet Standard   Bathroom Equipment Grab bars in shower;Grab bars around toilet   [address] [facility]  (has RW, denies using RW at home)   Prior Function   Level of [place] Independent with ADLs and functional mobility; Needs assistance with IADLs   Lives With Alone   Receives Help From Friend(s); Neighbor   ADL Assistance Independent   IADLs Needs assistance  (help with medications and meals from neighbor and friends)   Falls in the last 6 months 0   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)   ADL   Eating Assistance 5  Supervision/Setup   Eating Deficit Setup   UB [address] 5  Supervision/Setup   LB [address] 4  [address] 5  [address] [facility] [address] 3  Moderate [facility] Deficit Setup;Steadying; Impulsive;Verbal cueing;Supervison/safety; Increased time to complete; Bedside commode;Grab bar use;Perineal hygiene   Bed Mobility   Supine to Sit 4  Minimal assistance   Additional items Assist x 1; Increased time required;Verbal cues   Transfers   Sit to Stand 4  Minimal assistance   Additional items Assist x 1; Increased time required; Impulsive;Verbal cues   Stand to Sit 4  Minimal assistance   Additional items Assist x 1; Increased time required; Impulsive;Verbal cues   Stand pivot 4  Minimal assistance   Additional items Assist x 1; Increased time required; Impulsive;Verbal cues   Toilet transfer 4  Minimal assistance   Additional items Assist x 1; Increased time required; Impulsive;Verbal cues; Commode   Balance   Static Sitting Fair +   Dynamic Sitting Fair   Static Standing Fair -   Dynamic Standing Fair -   Ambulatory Poor +   Activity Tolerance   Activity Tolerance Patient limited by fatigue;Patient limited by pain   Nurse Made Aware TATUM [place] Assessment   RUE Assessment WFL   LUE Assessment   LUE Assessment WFL   Hand Function   Gross Motor Coordination Functional   Cognition   Arousal/Participation Cooperative   Attention Difficulty attending to directions   Orientation Level Oriented X4   Memory Within functional limits   Following Commands Follows one step commands with increased time or repetition   Assessment   Limitation Decreased ADL status; Decreased Safe judgement during ADL;Decreased endurance;Decreased self-care trans;Decreased high-level ADLs   Prognosis Good   Assessment Pt is a 82 y o  female seen for OT evaluation s/p admit to [address] [place] [address] on 4/12/2021 w/ Ambulatory dysfunction  Comorbidities affecting pt's functional performance at time of assessment include: Ambulatory dysfunction, DM II, CHF, A-fib, COPD  Personal factors affecting pt at time of IE include:steps to enter environment, limited home support, difficulty performing ADLS, difficulty performing IADLS  and limited insight into deficits  Prior to admission, pt was Mod I with ADLs  Upon evaluation: the following deficits impact occupational performance: decreased balance, decreased tolerance, impulsivity and decreased safety awareness   Pt to benefit from continued skilled OT tx while in the hospital to address deficits as defined above and maximize level of functional independence w ADL's and functional mobility  Occupational Performance areas to address include: bathing/shower, toilet hygiene, dressing, functional mobility and clothing management  From OT standpoint, recommendation at time of d/c would be STR  Goals   Patient Goals "to get stronger and go home"   Plan   Treatment Interventions ADL retraining;Functional transfer training; Endurance training;Cognitive reorientation; Neuromuscular reeducation; Energy conservation   Goal Expiration Date 04/23/21   OT Treatment Day 0   OT Frequency 3-5x/wk   Recommendation   OT Discharge Recommendation Post acute rehabilitation services   OT - OK to Discharge Yes  (when medically stabl)   Additional Comments  The patient's raw score on the AM-PAC Daily Activity inpatient short form is 16, standardized score is 35 96, less than 39 4  Patients at this level are likely to benefit from discharge to post-acute rehabilitation services  Please refer to the recommendation of the Occupational Therapist for safe discharge planning     AM-PAC Daily Activity Inpatient   Lower Body Dressing 2   Bathing 2   Toileting 3   Upper Body Dressing 3   Grooming 3   Eating 3   Daily Activity Raw Score 16   Daily Activity Standardized Score (Calc for Raw Score >=11) 35 96   AM-[facility] Applied Cognition Inpatient   Following a Speech/Presentation 4   Understanding Ordinary Conversation 4   Taking Medications 3   Remembering Where Things Are Placed or Put Away 3   Remembering List of 4-5 Errands 3   Taking Care of Complicated Tasks 3   Applied Cognition Raw Score 20   Applied Cognition Standardized Score 41 76     GOALS:    Pt will achieve the following within specified time frame: STG  Pt will achieve the following goals within 5 days    *ADL transfers with CGA for inc'd independence with ADLs/purposeful tasks    *Self Feeding- (I) for inc'd independence with providing self nourishment    *UB ADL with (I) for inc'd independence with self cares    *LB ADL with Min (A) using AE prn for inc'd independence with self cares    *Toileting with CGA for clothing management and hygiene for return to PLOF with personal care    *Increase static stand balance and dyn stand balance to F for inc'd safety with standing purposeful tasks    *Increase stand tolerance x3 m for inc'd tolerance with standing purposeful tasks    *Participate in 10m UE therex to increase overall stamina/activity tolerance for purposeful tasks    *Bed mobility- (S) for inc'd independence to manage own comfort and initiate EOB & OOB purposeful tasks    Pt will achieve the following within specified time frame: LTG  Pt will achieve the following goals within 10 days    *ADL transfers with (S) for inc'd independence with ADLs/purposeful tasks    *LB ADL with CGA using AE prn for inc'd independence with self cares    *Toileting with (S) for clothing management and hygiene for return to PLOF with personal care    *Increase static stand balance and dyn stand balance to F+ for inc'd safety with standing purposeful tasks    *Increase stand tolerance x5 m for inc'd tolerance with standing purposeful tasks    *Bed mobility- (I) for inc'd independence to manage own comfort and initiate EOB & OOB purposeful tasks      Davin Sánchez MS, OTR/L

## 2021-04-13 NOTE — PLAN OF CARE
Problem: OCCUPATIONAL THERAPY ADULT  Goal: Performs self-care activities at highest level of function for planned discharge setting  See evaluation for individualized goals  Description: Treatment Interventions: ADL retraining, Functional transfer training, Endurance training, Cognitive reorientation, Neuromuscular reeducation, Energy conservation    See flowsheet documentation for full assessment, interventions and recommendations  Note: Limitation: Decreased ADL status, Decreased Safe judgement during ADL, Decreased endurance, Decreased self-care trans, Decreased high-level ADLs  Prognosis: Good  Assessment: Pt is a 82 y o  female seen for OT evaluation s/p admit to [address] [place] [address] on 4/12/2021 w/ Ambulatory dysfunction  Comorbidities affecting pt's functional performance at time of assessment include: Ambulatory dysfunction, DM II, CHF, A-fib, COPD  Personal factors affecting pt at time of IE include:steps to enter environment, limited home support, difficulty performing ADLS, difficulty performing IADLS  and limited insight into deficits  Prior to admission, pt was Mod I with ADLs  Upon evaluation: the following deficits impact occupational performance: decreased balance, decreased tolerance, impulsivity and decreased safety awareness  Pt to benefit from continued skilled OT tx while in the hospital to address deficits as defined above and maximize level of functional independence w ADL's and functional mobility  Occupational Performance areas to address include: bathing/shower, toilet hygiene, dressing, functional mobility and clothing management  From OT standpoint, recommendation at time of d/c would be STR       OT Discharge Recommendation: Post acute rehabilitation services  OT - OK to Discharge: Yes(when medically stabl)    Davin Espinosa MS, OTR/L

## 2021-04-13 NOTE — ASSESSMENT & PLAN NOTE
82year old female admitted due to intractable back pain  Similar to prior due to known compression fractures  Still with intractable pain, unsafe for discharge at this time   - PT recommending rehab  Previously discharged to home with [facility] since she refused rehab  Still undecided about rehab  Unsafe for discharge to home at this time    - Continue IV Pain control

## 2021-04-14 NOTE — PLAN OF CARE
Problem: PHYSICAL THERAPY ADULT  Goal: Performs mobility at highest level of function for planned discharge setting  See evaluation for individualized goals  Description: Treatment/Interventions: Functional transfer training, LE strengthening/ROM, Therapeutic exercise, Endurance training, Patient/family training, Bed mobility, Gait training, Spoke to nursing  Equipment Recommended: Arti Rasmussen       See flowsheet documentation for full assessment, interventions and recommendations  Outcome: Progressing  Note: Prognosis: Fair  Problem List: Decreased strength, Decreased endurance, Impaired balance, Decreased mobility, Pain, Impaired hearing  Assessment: Pt  found reesting OOB in stationary chair but willing to participate in PT treatment  Pt  reported pain level 7/10 on pain scale in LB  Pt  performed ther ex as per treatment flow sheet with occational rest period required due to fatigue  Pt  transfered from sit to stand with S and cues to push up from the arms of the chair and then ambulated 50 ft with RW and CGAx1 and cues for safety and direction  Pt  was assisted abck to chair and sat with S sand cues to reach back for the arms of the chair to prevent plopping  Pt  was positioned for comfort and left with all needs in reach and chair alarm activated  Pt  would benefit from STR to help improve functional mobility and activity tolerance  Continue current POC and progress as tolerated  Barriers to Discharge: Decreased caregiver support, Inaccessible home environment(3 SAVANNAH)           PT - OK to Discharge: Yes    See flowsheet documentation for full assessment        Rachel Liao, PTA

## 2021-04-14 NOTE — NURSING NOTE
Patient discharged to home in stable condition  All discharge instructions explained to patient's friend (who was providing transportation to home) at patient's request   Patient then transported to the hospital exit via wheelchair and was escorted there by the RN

## 2021-04-14 NOTE — CASE MANAGEMENT
Evaluated the pt at the bedside on 4/13/21  Pt here as Observation and was recently in this hospital DC on 4/7/21 for same  PT admitted to the hospital for back pain  Pt lives alone in a mobile home with 4 SAVANNAH  Pt indicated she takes care of herself, her friend provides transport  Pt is a  for the [employer] system  PT is oxygen dependent at 3L/m  PT DME oxygen 3 from [employer], shower bench, nebulizer  On previous admission a referral was made to [facility] to get in home services, sade OROPEZA CM and had a PCP visit prior to this admission  PT is recommending STR  Pt is continuing to refuse  A post acute care recommendation was made by your care team for STR  Discussed Freedom of Choice with patient  List of facilities given to patient via in person  patient aware the list is custom filtered for them by preference  and that [facility] post acute providers are designated  Provided a list to the pt and encouraged to consider STR  Pt states"I just want to get out of here  I am not coming back I want to die at home"  Requesting to make a TC now  Unsure if pt will decide to go to a SNF  Patient/caregiver received discharge checklist   Content reviewed  Patient/caregiver encouraged to participate in discharge plan of care prior to discharge home  CM reviewed d/c planning process including the following: identifying help at home, patient preference for d/c planning needs, availability of treatment team to discuss questions or concerns patient and/or family may have regarding understanding medications and recognizing signs and symptoms once discharged  CM also encouraged patient to follow up with all recommended appointments after discharge  Patient advised of importance for patient and family to participate in managing patients medical well being

## 2021-04-14 NOTE — ASSESSMENT & PLAN NOTE
Similar characteristics to prior, no need for any acute intervention at this time  Physical therapy recommending rehab however patient refusing rehab    Will discharge with home care

## 2021-04-14 NOTE — CASE MANAGEMENT
Discussed with the pt at the bedside STR  Pt very  adamant and states " I would rather go home and die  I am not coming back here"  Pt states the person from [facility] came on Saturday to get her information, however pt expected care to be set up the next day  Pt deos not want to go to STR, would rather go home and continue with [facility]  A post acute care recommendation was made by your care team for [facility]  Discussed [place] of Choice with patient  Choice is to return/continue services  A referral was made to [facility]  Will need transport home  Sent another e-mail to [facility] to make sure she gets an application completed for the waiver program   Will need transport home

## 2021-04-14 NOTE — DISCHARGE SUMMARY
[facility]  Discharge- Jabier Louisr 1935, 82 y o  female MRN: [id number]  Unit/Bed#: -01 Encounter: [id number]  Primary Care Provider: LEEROY Hutchinson   Date and time admitted to hospital: 4/12/2021  6:21 PM    Atrial fibrillation ([facility] [address] )  Assessment & Plan  Rate controlled  - Continue metoprolol and apixaban    COPD (chronic obstructive pulmonary disease) ([facility] )  Assessment & Plan  Not in acute exacerbation  - Continue home medications  - On chronic prednisone    Type 2 diabetes mellitus without complication, without long-term current use of insulin ([facility])  Assessment & Plan  Continue home diabetic regimen    Chronic respiratory failure with hypoxia ([facility])  Assessment & Plan  At 3L baseline  Due to COPD/CHF  Chronic diastolic CHF (congestive heart failure) ([facility])  Assessment & Plan  Not in acute exacerbation  Continue home medications  Outpatient follow-up with PCP/Cardiology    Compression fracture of L4 vertebra [facility])  Assessment & Plan  Similar characteristics to prior, no need for any acute intervention at this time  Physical therapy recommending rehab however patient refusing rehab  Will discharge with home care    Gastroesophageal reflux disease without esophagitis  Assessment & Plan  Continue home PPI    * Ambulatory dysfunction  Assessment & Plan  Physical therapy recommending rehab however patient refusing to go to rehab  Will discharge home with home care  Case management arranged for home care        Discharging Physician / Practitioner: Sara Castellanos MD  PCP: Melani Roper, [address] [place] [address]  Admission Date:   Admission Orders (From admission, onward)     Ordered        04/13/21 1212  Inpatient Admission  Once         04/12/21 1932  Place in Observation  Once                   Discharge Date: 04/14/21    Resolved Problems  Date Reviewed: 4/13/2021    None          Consultations During Hospital Stay:  · None    Procedures Performed: · None    Significant Findings / Test Results:   · Ambulatory dysfunction    Incidental Findings:   · None     Test Results Pending at Discharge (will require follow up): · None     Outpatient Tests Requested:  · Routine labs with PCP including magnesium and potassium levels    Complications:     None    Reason for Admission:  Ambulatory dysfunction    Hospital Course:     Juventino Turner is a 82 y o  female patient who originally presented to the hospital on 4/12/2021 due to back pain  Patient presented with back pain and ambulatory dysfunction  Patient was seen by physical therapy who recommended inpatient rehab  Patient refused to go to rehab and states that she wants to go home  Case management arranged for home care  Imaging studies of the lumbar spine showed compression fractures which appear similar to previous imaging  No neurologic complaints  Patient on home oxygen requirements  Patient discharged with home care  Please see above list of diagnoses and related plan for additional information  Condition at Discharge: stable     Discharge Day Visit / Exam:     Subjective:  No complaints at this time    Vitals: Blood Pressure: 107/63 (04/14/21 0725)  Pulse: (!) 113 (04/14/21 0725)  Temperature: 97 5 °F (36 4 °C) (04/14/21 0725)  Temp Source: Temporal (04/14/21 0725)  Respirations: 16 (04/14/21 0725)  Height: 5' (152 4 cm) (04/12/21 2014)  Weight - Scale: 60 5 kg (133 lb 4 3 oz) (04/12/21 2014)  SpO2: 97 % (04/14/21 1500)  Exam:   Physical Exam  Constitutional:       General: She is not in acute distress  Comments: Frail elderly female   HENT:      Head: Normocephalic and atraumatic  Nose: Nose normal       Mouth/Throat:      Mouth: Mucous membranes are moist    Eyes:      Extraocular Movements: Extraocular movements intact  Conjunctiva/sclera: Conjunctivae normal    Neck:      Musculoskeletal: Normal range of motion and neck supple     Cardiovascular:      Rate and Rhythm: Normal rate and regular rhythm  Pulmonary:      Effort: Pulmonary effort is normal  No respiratory distress  Abdominal:      Palpations: Abdomen is soft  Tenderness: There is no abdominal tenderness  Musculoskeletal:      Right lower leg: No edema  Left lower leg: No edema  Comments: Generalized weakness   Skin:     General: Skin is warm  Findings: No bruising  Neurological:      General: No focal deficit present  Cranial Nerves: No cranial nerve deficit  Psychiatric:         Mood and Affect: Mood normal          Behavior: Behavior normal          Discharge instructions/Information to patient and family:   See after visit summary for information provided to patient and family  Provisions for Follow-Up Care:  See after visit summary for information related to follow-up care and any pertinent home health orders  Disposition:     Home with [facility] Services (Reminder: Complete face to face encounter)      Planned Readmission:    no     Discharge Statement:  I spent 35 minutes discharging the patient  This time was spent on the day of discharge  I had direct contact with the patient on the day of discharge  Greater than 50% of the total time was spent examining patient, answering all patient questions, arranging and discussing plan of care with patient as well as directly providing post-discharge instructions  Additional time then spent on discharge activities  Discharge Medications:  See after visit summary for reconciled discharge medications provided to patient and family        ** Please Note: This note has been constructed using a voice recognition system **

## 2021-04-14 NOTE — PLAN OF CARE
Problem: Potential for Falls  Goal: Patient will remain free of falls  Description: INTERVENTIONS:  - Assess patient frequently for physical needs  -  Identify cognitive and physical deficits and behaviors that affect risk of falls    -  [place] fall precautions as indicated by assessment   - Educate patient/family on patient safety including physical limitations  - Instruct patient to call for assistance with activity based on assessment  - Modify environment to reduce risk of injury  - Consider OT/PT consult to assist with strengthening/mobility  Outcome: Progressing     Problem: Prexisting or High Potential for Compromised Skin Integrity  Goal: Skin integrity is maintained or improved  Description: INTERVENTIONS:  - Identify patients at risk for skin breakdown  - Assess and monitor skin integrity  - Assess and monitor nutrition and hydration status  - Monitor labs   - Assess for incontinence   - Turn and reposition patient  - Assist with mobility/ambulation  - Relieve pressure over bony prominences  - Avoid friction and shearing  - Provide appropriate hygiene as needed including keeping skin clean and dry  - Evaluate need for skin moisturizer/barrier cream  - Collaborate with interdisciplinary team   - Patient/family teaching  - Consider wound care consult   Outcome: Progressing     Problem: PAIN - ADULT  Goal: Verbalizes/displays adequate comfort level or baseline comfort level  Description: Interventions:  - Encourage patient to monitor pain and request assistance  - Assess pain using appropriate pain scale  - Administer analgesics based on type and severity of pain and evaluate response  - Implement non-pharmacological measures as appropriate and evaluate response  - Consider cultural and social influences on pain and pain management  - Notify physician/advanced practitioner if interventions unsuccessful or patient reports new pain  Outcome: Progressing     Problem: INFECTION - ADULT  Goal: Absence or prevention of progression during hospitalization  Description: INTERVENTIONS:  - Assess and monitor for signs and symptoms of infection  - Monitor lab/diagnostic results  - Monitor all insertion sites, i e  indwelling lines, tubes, and drains  - Monitor endotracheal if appropriate and nasal secretions for changes in amount and color  - [place] appropriate cooling/warming therapies per order  - Administer medications as ordered  - Instruct and encourage patient and family to use good hand hygiene technique  - Identify and instruct in appropriate isolation precautions for identified infection/condition  Outcome: Progressing  Goal: Absence of fever/infection during neutropenic period  Description: INTERVENTIONS:  - Monitor WBC    Outcome: Progressing     Problem: SAFETY ADULT  Goal: Patient will remain free of falls  Description: INTERVENTIONS:  - Assess patient frequently for physical needs  -  Identify cognitive and physical deficits and behaviors that affect risk of falls    -  [place] fall precautions as indicated by assessment   - Educate patient/family on patient safety including physical limitations  - Instruct patient to call for assistance with activity based on assessment  - Modify environment to reduce risk of injury  - Consider OT/PT consult to assist with strengthening/mobility  Outcome: Progressing  Goal: Maintain or return to baseline ADL function  Description: INTERVENTIONS:  -  Assess patient's ability to carry out ADLs; assess patient's baseline for ADL function and identify physical deficits which impact ability to perform ADLs (bathing, care of mouth/teeth, toileting, grooming, dressing, etc )  - Assess/evaluate cause of self-care deficits   - Assess range of motion  - Assess patient's mobility; develop plan if impaired  - Assess patient's need for assistive devices and provide as appropriate  - Encourage maximum independence but intervene and supervise when necessary  - Involve family in performance of ADLs  - Assess for home care needs following discharge   - Consider OT consult to assist with ADL evaluation and planning for discharge  - Provide patient education as appropriate  Outcome: Progressing  Goal: Maintain or return mobility status to optimal level  Description: INTERVENTIONS:  - Assess patient's baseline mobility status (ambulation, transfers, stairs, etc )    - Identify cognitive and physical deficits and behaviors that affect mobility  - Identify mobility aids required to assist with transfers and/or ambulation (gait belt, sit-to-stand, lift, walker, cane, etc )  - [place] fall precautions as indicated by assessment  - Record patient progress and toleration of activity level on Mobility SBAR; progress patient to next Phase/Stage  - Instruct patient to call for assistance with activity based on assessment  - Consider rehabilitation consult to assist with strengthening/weightbearing, etc   Outcome: Progressing     Problem: DISCHARGE PLANNING  Goal: Discharge to home or other facility with appropriate resources  Description: INTERVENTIONS:  - Identify barriers to discharge w/patient and caregiver  - Arrange for needed discharge resources and transportation as appropriate  - Identify discharge learning needs (meds, wound care, etc )  - Arrange for interpretive services to assist at discharge as needed  - Refer to Case Management Department for coordinating discharge planning if the patient needs post-hospital services based on physician/advanced practitioner order or complex needs related to functional status, cognitive ability, or social support system  Outcome: Progressing     Problem: Knowledge Deficit  Goal: Patient/family/caregiver demonstrates understanding of disease process, treatment plan, medications, and discharge instructions  Description: Complete learning assessment and assess knowledge base    Interventions:  - Provide teaching at level of understanding  - Provide teaching via preferred learning methods  Outcome: Progressing     Problem: NEUROSENSORY - ADULT  Goal: Achieves stable or improved neurological status  Description: INTERVENTIONS  - Monitor and report changes in neurological status  - Monitor vital signs such as temperature, blood pressure, glucose, and any other labs ordered   - Initiate measures to prevent increased intracranial pressure  - Monitor for seizure activity and implement precautions if appropriate      Outcome: Progressing  Goal: Remains free of injury related to seizures activity  Description: INTERVENTIONS  - Maintain airway, patient safety  and administer oxygen as ordered  - Monitor patient for seizure activity, document and report duration and description of seizure to physician/advanced practitioner  - If seizure occurs,  ensure patient safety during seizure  - Reorient patient post seizure  - Seizure pads on all 4 side rails  - Instruct patient/family to notify RN of any seizure activity including if an aura is experienced  - Instruct patient/family to call for assistance with activity based on nursing assessment  - Administer anti-seizure medications if ordered    Outcome: Progressing  Goal: Achieves maximal functionality and self care  Description: INTERVENTIONS  - Monitor swallowing and airway patency with patient fatigue and changes in neurological status  - Encourage and assist patient to increase activity and self care     - Encourage visually impaired, hearing impaired and aphasic patients to use assistive/communication devices  Outcome: Progressing     Problem: RESPIRATORY - ADULT  Goal: Achieves optimal ventilation and oxygenation  Description: INTERVENTIONS:  - Assess for changes in respiratory status  - Assess for changes in mentation and behavior  - Position to facilitate oxygenation and minimize respiratory effort  - Oxygen administered by appropriate delivery if ordered  - Initiate smoking cessation education as indicated  - Encourage broncho-pulmonary hygiene including cough, deep breathe, Incentive Spirometry  - Assess the need for suctioning and aspirate as needed  - Assess and instruct to report SOB or any respiratory difficulty  - Respiratory Therapy support as indicated  Outcome: Progressing     Problem: GENITOURINARY - ADULT  Goal: Maintains or returns to baseline urinary function  Description: INTERVENTIONS:  - Assess urinary function  - Encourage oral fluids to ensure adequate hydration if ordered  - Administer IV fluids as ordered to ensure adequate hydration  - Administer ordered medications as needed  - Offer frequent toileting  - Follow urinary retention protocol if ordered  Outcome: Progressing  Goal: Absence of urinary retention  Description: INTERVENTIONS:  - Assess patients ability to void and empty bladder  - Monitor I/O  - Bladder scan as needed  - Discuss with physician/AP medications to alleviate retention as needed  - Discuss catheterization for long term situations as appropriate  Outcome: Progressing  Goal: Urinary catheter remains patent  Description: INTERVENTIONS:  - Assess patency of urinary catheter  - If patient has a chronic doyle, consider changing catheter if non-functioning  - Follow guidelines for intermittent irrigation of non-functioning urinary catheter  Outcome: Progressing     Problem: METABOLIC, FLUID AND ELECTROLYTES - ADULT  Goal: Electrolytes maintained within normal limits  Description: INTERVENTIONS:  - Monitor labs and assess patient for signs and symptoms of electrolyte imbalances  - Administer electrolyte replacement as ordered  - Monitor response to electrolyte replacements, including repeat lab results as appropriate  - Instruct patient on fluid and nutrition as appropriate  Outcome: Progressing  Goal: Fluid balance maintained  Description: INTERVENTIONS:  - Monitor labs   - Monitor I/O and WT  - Instruct patient on fluid and nutrition as appropriate  - Assess for signs & symptoms of volume excess or deficit  Outcome: Progressing  Goal: Glucose maintained within target range  Description: INTERVENTIONS:  - Monitor Blood Glucose as ordered  - Assess for signs and symptoms of hyperglycemia and hypoglycemia  - Administer ordered medications to maintain glucose within target range  - Assess nutritional intake and initiate nutrition service referral as needed  Outcome: Progressing     Problem: SKIN/TISSUE INTEGRITY - ADULT  Goal: Skin integrity remains intact  Description: INTERVENTIONS  - Identify patients at risk for skin breakdown  - Assess and monitor skin integrity  - Assess and monitor nutrition and hydration status  - Monitor labs (i e  albumin)  - Assess for incontinence   - Turn and reposition patient  - Assist with mobility/ambulation  - Relieve pressure over bony prominences  - Avoid friction and shearing  - Provide appropriate hygiene as needed including keeping skin clean and dry  - Evaluate need for skin moisturizer/barrier cream  - Collaborate with interdisciplinary team (i e  Nutrition, Rehabilitation, etc )   - Patient/family teaching  Outcome: Progressing  Goal: Incision(s), wounds(s) or drain site(s) healing without S/S of infection  Description: INTERVENTIONS  - Assess and document risk factors for skin impairment   - Assess and document dressing, incision, wound bed, drain sites and surrounding tissue  - Consider nutrition services referral as needed  - Oral mucous membranes remain intact  - Provide patient/ family education  Outcome: Progressing  Goal: Oral mucous membranes remain intact  Description: INTERVENTIONS  - Assess oral mucosa and hygiene practices  - Implement preventative oral hygiene regimen  - Implement oral medicated treatments as ordered  - Initiate Nutrition services referral as needed  Outcome: Progressing     Problem: MUSCULOSKELETAL - ADULT  Goal: Maintain or return mobility to safest level of function  Description: INTERVENTIONS:  - Assess patient's ability to carry out ADLs; assess patient's baseline for ADL function and identify physical deficits which impact ability to perform ADLs (bathing, care of mouth/teeth, toileting, grooming, dressing, etc )  - Assess/evaluate cause of self-care deficits   - Assess range of motion  - Assess patient's mobility  - Assess patient's need for assistive devices and provide as appropriate  - Encourage maximum independence but intervene and supervise when necessary  - Involve family in performance of ADLs  - Assess for home care needs following discharge   - Consider OT consult to assist with ADL evaluation and planning for discharge  - Provide patient education as appropriate  Outcome: Progressing  Goal: Maintain proper alignment of affected body part  Description: INTERVENTIONS:  - Support, maintain and protect limb and body alignment  - Provide patient/ family with appropriate education  Outcome: Progressing

## 2021-04-14 NOTE — PHYSICAL THERAPY NOTE
04/14/21 0845   PT Last Visit   PT Visit Date 04/14/21   Note Type   Note Type Treatment   Pain Assessment   Pain Assessment Tool 0-10   Pain Score 7   Pain Location/Orientation Location: Back   Pain Onset/Description Onset: Ongoing   Patient's Stated Pain Goal No pain   Hospital Pain Intervention(s) Repositioned; Ambulation/increased activity   Restrictions/Precautions   Weight Bearing Precautions Per Order No   Other Precautions O2;Fall Risk;Pain; Chair Alarm; Bed Alarm   General   Chart Reviewed Yes   Response to Previous Treatment Patient with no complaints from previous session  Family/Caregiver Present No   Cognition   Overall Cognitive Status WFL   Arousal/Participation Alert; Cooperative   Attention Within functional limits   Orientation Level Oriented X4   Memory Within functional limits   Following Commands Follows one step commands without difficulty   Subjective   Subjective "I wanna go home and get therapy there, I'm not going to rehab "   Transfers   Sit to Stand 5  Supervision   Additional items Assist x 1; Armrests; Increased time required;Verbal cues   Stand to Sit 5  Supervision   Additional items Assist x 1; Armrests; Increased time required;Verbal cues   Ambulation/Elevation   Gait pattern Improper Weight shift;Decreased foot clearance; Foward flexed; Inconsistent uri; Short stride   Gait Assistance   (CGA)   Additional items Assist x 1;Verbal cues   Assistive Device Rolling walker   Distance 50 ft   Balance   Static Sitting Fair +   Dynamic Sitting Fair   Static Standing Fair -   Dynamic Standing Fair -   Ambulatory Poor +   Endurance Deficit   Endurance Deficit Yes   Activity Tolerance   Activity Tolerance Patient limited by fatigue;Patient limited by pain   Nurse Made Aware RN aware   Exercises   Quad Sets Sitting;15 reps;AROM; Bilateral   Heelslides Sitting;15 reps;AROM; Bilateral   Glute Sets Sitting;15 reps;AROM; Bilateral   Hip Flexion Sitting;15 reps;AROM; Bilateral   Hip Abduction Sitting;15 reps;AROM; Bilateral   Hip Adduction Sitting;15 reps;AROM; Bilateral   Knee AROM Long Arc Quad Sitting;15 reps;AROM; Bilateral   Ankle Pumps Sitting;15 reps;AROM; Bilateral   Marching Sitting;15 reps;AROM; Bilateral   Assessment   Prognosis Fair   Problem List Decreased strength;Decreased endurance; Impaired balance;Decreased mobility;Pain; Impaired hearing   Assessment Pt  found reesting OOB in stationary chair but willing to participate in PT treatment  Pt  reported pain level 7/10 on pain scale in LB  Pt  performed ther ex as per treatment flow sheet with occational rest period required due to fatigue  Pt  transfered from sit to stand with S and cues to push up from the arms of the chair and then ambulated 50 ft with RW and CGAx1 and cues for safety and direction  Pt  was assisted abck to chair and sat with S sand cues to reach back for the arms of the chair to prevent plopping  Pt  was positioned for comfort and left with all needs in reach and chair alarm activated  Pt  would benefit from STR to help improve functional mobility and activity tolerance  Continue current POC and progress as tolerated  Goals   Patient Goals to go home   PT Treatment Day 1   Plan   Treatment/Interventions Functional transfer training;LE strengthening/ROM; Endurance training; Therapeutic exercise;Patient/family training;Equipment eval/education; Bed mobility;Gait training;Spoke to nursing   Progress Progressing toward goals   PT Frequency   (3-5x/wk)   Recommendation   PT Discharge Recommendation Post acute rehabilitation services   PT - OK to Discharge Yes   Additional Comments when medically stable   AM-PAC Basic Mobility Inpatient   Turning in Bed Without Bedrails 3   Lying on Back to Sitting on Edge of Flat Bed 3   Moving Bed to Chair 3   Standing Up From Chair 3   Walk in Room 3   Climb 3-5 Stairs 2   Basic Mobility Inpatient Raw Score 17   Basic Mobility Standardized Score 39 67      04/14/21 0845   PT Last Visit   PT Visit Date 04/14/21   Note Type   Note Type Treatment   Pain Assessment   Pain Assessment Tool 0-10   Pain Score 7   Pain Location/Orientation Location: Back   Pain Onset/Description Onset: Ongoing   Patient's Stated Pain Goal No pain   Hospital Pain Intervention(s) Repositioned; Ambulation/increased activity   Restrictions/Precautions   Weight Bearing Precautions Per Order No   Other Precautions O2;Fall Risk;Pain; Chair Alarm; Bed Alarm   General   Chart Reviewed Yes   Response to Previous Treatment Patient with no complaints from previous session  Family/Caregiver Present No   Cognition   Overall Cognitive Status WFL   Arousal/Participation Alert; Cooperative   Attention Within functional limits   Orientation Level Oriented X4   Memory Within functional limits   Following Commands Follows one step commands without difficulty   Subjective   Subjective "I wanna go home and get therapy there, I'm not going to rehab "   Transfers   Sit to Stand 5  Supervision   Additional items Assist x 1; Armrests; Increased time required;Verbal cues   Stand to Sit 5  Supervision   Additional items Assist x 1; Armrests; Increased time required;Verbal cues   Ambulation/Elevation   Gait pattern Improper Weight shift;Decreased foot clearance; Foward flexed; Inconsistent uri; Short stride   Gait Assistance   (CGA)   Additional items Assist x 1;Verbal cues   Assistive Device Rolling walker   Distance 50 ft   Balance   Static Sitting Fair +   Dynamic Sitting Fair   Static Standing Fair -   Dynamic Standing Fair -   Ambulatory Poor +   Endurance Deficit   Endurance Deficit Yes   Activity Tolerance   Activity Tolerance Patient limited by fatigue;Patient limited by pain   Nurse Made Aware RN aware   Exercises   Quad Sets Sitting;15 reps;AROM; Bilateral   Heelslides Sitting;15 reps;AROM; Bilateral   Glute Sets Sitting;15 reps;AROM; Bilateral   Hip Flexion Sitting;15 reps;AROM; Bilateral   Hip Abduction Sitting;15 reps;AROM; Bilateral   Hip Adduction Sitting;15 reps;AROM; Bilateral   Knee AROM Long Arc Quad Sitting;15 reps;AROM; Bilateral   Ankle Pumps Sitting;15 reps;AROM; Bilateral   Marching Sitting;15 reps;AROM; Bilateral   Assessment   Prognosis Fair   Problem List Decreased strength;Decreased endurance; Impaired balance;Decreased mobility;Pain; Impaired hearing   Assessment Pt  found reesting OOB in stationary chair but willing to participate in PT treatment  Pt  reported pain level 7/10 on pain scale in LB  Pt  performed ther ex as per treatment flow sheet with occational rest period required due to fatigue  Pt  transfered from sit to stand with S and cues to push up from the arms of the chair and then ambulated 50 ft with RW and CGAx1 and cues for safety and direction  Pt  was assisted abck to chair and sat with S sand cues to reach back for the arms of the chair to prevent plopping  Pt  was positioned for comfort and left with all needs in reach and chair alarm activated  Pt  would benefit from STR to help improve functional mobility and activity tolerance  Continue current POC and progress as tolerated  Goals   Patient Goals to go home   PT Treatment Day 1   Plan   Treatment/Interventions Functional transfer training;LE strengthening/ROM; Endurance training; Therapeutic exercise;Patient/family training;Equipment eval/education; Bed mobility;Gait training;Spoke to nursing   Progress Progressing toward goals   PT Frequency   (3-5x/wk)   Recommendation   PT Discharge Recommendation Post acute rehabilitation services   PT - OK to Discharge Yes   Additional Comments when medically stable   AM-PAC Basic Mobility Inpatient   Turning in Bed Without Bedrails 3   Lying on Back to Sitting on Edge of Flat Bed 3   Moving Bed to Chair 3   Standing Up From Chair 3   Walk in Room 3   Climb 3-5 Stairs 2   Basic Mobility Inpatient Raw Score 17   Basic Mobility Standardized Score 39 67   Heather Villanueva, PTA

## 2021-04-14 NOTE — ASSESSMENT & PLAN NOTE
Physical therapy recommending rehab however patient refusing to go to rehab  Will discharge home with home care  Case management arranged for home care

## 2021-04-15 NOTE — TELEPHONE ENCOUNTER
When MARIBEL is scheduled, she will also need a Rx for a Glucometer, test strips and lancets  She was given a rx at her last hospital stay but they didn't write a Dx code on it, a sig or prescribe lancets

## 2021-04-15 NOTE — TELEPHONE ENCOUNTER
Pt called to schedule MARIBEL  She was admitted to [facility] on 4/12/21, d/c- 4/14/21, dx- ambulatory dysfunction  Appt scheduled on 4/20/21 with Amanuel

## 2021-04-15 NOTE — CASE MANAGEMENT
Pt left last pm with a friend  Sent a referral to [facility] to resume care  Pt ahs a MARIBEL appt set up with [facility]

## 2021-04-17 NOTE — CASE MANAGEMENT
Cm received a  Bala from this pt at 10:37am stating that Ineed to get her into rehab NOW, pt is active with [facility], she stated she will nt have a nurse come back until Monday, she wants only to go to the [facility], I explained the there is no one in admissions on the weekends at the [facility]  I told her I would let the [facility] nurse know so she can help with this process and I sent referral to the [facility] for the pt and I placed a call to Magdalena Dutta to make her aware of the referral , I spoke to her neighbor that is helping her that I would follow up on Monday

## 2021-04-19 NOTE — CASE MANAGEMENT
Tomi placed a call to the [facility] at 08:43 and I spoke with Genia Phalen, she did receive my referral, they are reviewing, she will reach at to [facility] and she will call me with their determination

## 2021-04-19 NOTE — TELEPHONE ENCOUNTER
Mikey Parra the  called  PT wants to be admitted to the [facility]  They are willing to take her however she needs a covid test 24 prior to going  They have a nurse to get this test tomorrow they just need an order  If you could please put that in?  Thank you

## 2021-04-20 NOTE — TELEPHONE ENCOUNTER
pt is having a hard time with swallowing  her meds, when [facility] calls could you change come of her meds to  Liquids?([facility]ari nurse is doing an eval now on her)

## 2021-04-20 NOTE — TELEPHONE ENCOUNTER
I will gladly change her medications to liquid, I would like to see what her tests show first  Thank You

## 2021-04-23 NOTE — PHYSICIAN ADVISOR
Current patient class: Inpatient  The patient is currently on Hospital Day: 3 at [address]      The patient was admitted to the hospital at [phone] on 4/13/21 for the following diagnosis:  Back pain [M54 9]  Chronic back pain [M54 9, G89 29]  Intractable back pain [M54 9]       There was documentation in the medical record of an expected length of stay of at least 2 midnights  The patient was therefore expected to satisfy the 2 midnight benchmark and given the 2 midnight presumption was appropriate for INPATIENT ADMISSION  Given this expectation of a satisfying stay, CMS instructs us that the patient is most often appropriate for inpatient admission under part A provided medical necessity is documented in the chart  After review of the relevant documentation, labs, vital signs and test results, the patient is appropriate for INPATIENT ADMISSION  Admission to the hospital as an inpatient is a complex decision making process which requires the practitioner to consider the patients presenting complaint, history and physical examination and all relevant testing  With this in mind, in this case, the patient was deemed appropriate for INPATIENT ADMISSION  After review of the documentation and testing available at the time of the admission, I concur with this clinical determination of medical necessity  For the reason noted, the patient was discharged before reaching 2 midnights as an inpatient  Rationale is as follows: The patient is a 82 yrs old Female who presented to the ED at 4/12/2021  6:21 PM with a chief complaint of Back Pain (cxhronic  Was here last week and admitted for same)   Patient had ambulatory dysfunction likely related to back pain  On day 2 of admission patient was seen by Physical therapy who recommended rehab however patient still had intractable pain and required IV pain control    Given the need for better pain control in a patient who came with back pain ambulatory dysfunction elderly male patient who also need rehab the patient did cross the 2 midnight benchmark as set by Medicare is inpatient status appropriate  The patients vitals on arrival were   ED Triage Vitals   Temperature Pulse Respirations Blood Pressure SpO2   04/12/21 1826 04/12/21 1826 04/12/21 1826 04/12/21 1826 04/12/21 1155   (!) 97 4 °F (36 3 °C) (!) 110 22 119/68 97 %      Temp Source Heart Rate Source Patient Position - Orthostatic VS BP Location FiO2 (%)   04/12/21 1826 04/13/21 2100 04/12/21 1826 04/12/21 1826 --   Temporal Monitor Lying Left arm       Pain Score       04/12/21 1826       Worst Possible Pain           Past Medical History:   Diagnosis Date    Allergies     Asthma     Chronic diastolic CHF (congestive heart failure) (HCC)     Colitis     Colon polyp     COPD (chronic obstructive pulmonary disease)     Diverticulosis     DJD (degenerative joint disease)     Gait abnormality     Hypertension     Paroxysmal atrial fibrillation (HCC)      Past Surgical History:   Procedure Laterality Date    BLADDER SURGERY      COLON SURGERY      COLONOSCOPY      COLONOSCOPY W/ POLYPECTOMY N/A 1/21/2019    Procedure: COLONOSCOPY W/ POLYPECTOMY;  Surgeon: Mariana Montes De Oca MD;  Location: [facility] GI LAB; Service: General    FL GUIDED NEEDLE PLAC BX/ASP/INJ  9/2/2020    FL GUIDED NEEDLE PLAC BX/ASP/INJ  1/21/2021    GASTROJEJUNOSTOMY W/ JEJUNOSTOMY TUBE N/A 2/3/2020    Procedure: Antrectomy with bilroth II Anastomosis;   Surgeon: Fer Mills MD;  Location:  MAIN OR;  Service: Mayra Butts JOINT Right 9/2/2020    Procedure: BLOCK / INJECTION SACROILIAC;  Surgeon: Doe Juarez MD;  Location: [facility];  Service: Pain Management     SD INJECTION,SACROILIAC JOINT Right 1/21/2021    Procedure: RIGHT SACROILIAC JOINT INJECTION;  Surgeon: Doe Juarez MD;  Location: [facility];  Service: Pain Management     SMALL INTESTINE SURGERY  03/2020 with partial gastrectomy           Consults have been placed to:   None    Vitals:    04/14/21 0527 04/14/21 0714 04/14/21 0725 04/14/21 1500   BP: 118/69  107/63    BP Location: Left arm  Left arm    Pulse: 92  (!) 113    Resp:   16    Temp:   97 5 °F (36 4 °C)    TempSrc:   Temporal    SpO2:  97% 99% 97%   Weight:       Height:           Most recent labs:    No results for input(s): WBC, HGB, HCT, PLT, K, NA, CALCIUM, BUN, CREATININE, LIPASE, AMYLASE, INR, TROPONINI, CKTOTAL, AST, ALT, ALKPHOS, BILITOT in the last 72 hours  Scheduled Meds:  Continuous Infusions:No current facility-administered medications for this encounter  PRN Meds:      Surgical procedures (if appropriate):

## 2021-04-25 PROBLEM — J43.8 OTHER EMPHYSEMA (HCC): Status: ACTIVE | Noted: 2018-12-28

## 2021-04-25 PROBLEM — I50.33 ACUTE ON CHRONIC DIASTOLIC CONGESTIVE HEART FAILURE (HCC): Status: ACTIVE | Noted: 2018-01-23

## 2021-04-25 PROBLEM — I50.32 CHRONIC DIASTOLIC CONGESTIVE HEART FAILURE (HCC): Status: ACTIVE | Noted: 2018-01-23

## 2021-04-25 NOTE — NURSING NOTE
Resting in bed, was asleep  Awakened for assessment  ORIENTED X4  02 MAINTAINED 3 L N/C  LUNGS ARE DECREASED/COARSE BS  PT IS SOMEWHAT ANXIOUS, WHEN AWAKENED  HEART IS IRREGULAR  PULSE 128/MIN  BP SYSTOLIC 112'G  RESP 27  ABDOMEN IS SOFT AND NON TENDER  LBM WAS SEVERAL TODAY, SOFT/FORMED STOOL  VOIDING VIA BEDPAN  OFFERS NO C/O PAIN  TRACE ANKLE EDEMA   CALL CASIANO GIVEN  SCDS MAINTAINED  3 RAILS UP AND BED ALARM

## 2021-04-25 NOTE — H&P
[facility]  H&P- Forrest Mix 1935, 82 y o  female MRN: [id number]  Unit/Bed#: ICU 06 Encounter: [id number]  Primary Care Provider: LEEROY Guerra   Date and time admitted to hospital: 4/25/2021  6:51 AM    * Atrial fibrillation with rapid ventricular response (HCC)  Assessment & Plan  ·  Noted heart rate in the ER of 140s  · Associated with dyspnea and "not feeling right"  · Currently is on Cardizem drip with much controlled heart rate  The patient received 1 dose of IV diltiazem 15 mg in the ED  · Cardiology input appreciated   · Cardiology has started the patient on oral diltiazem 30 mg q 6 hours  Continue with metoprolol 12 5 mg b i d     Will titrate off diltiazem drip as able  ·  continue with apixaban for anticoagulation    Type 2 diabetes mellitus without complication, without long-term current use of insulin [facility])  Assessment & Plan  Lab Results   Component Value Date    HGBA1C 12 6 (A) 04/09/2021       Recent Labs     04/23/21  0422 04/24/21  0427 04/24/21  1715 04/25/21  0444   POCGLU 99 102 151* 98       Blood Sugar Average: Last 72 hrs:  · will hold off oral anti diabetes medication from home   · Placed on insulin sliding scale    Chronic respiratory failure with hypoxia (HCC)  Assessment & Plan  ·  The patient wears 2-3 L of oxygen at home for chronic COPD    Closed wedge compression fracture of T12 vertebra (HCC)  Assessment & Plan  · Continue with physical therapy/ occupational therapy   · Pain management    Acute on chronic diastolic congestive heart failure (HCC)  Assessment & Plan  Wt Readings from Last 3 Encounters:   04/25/21 57 1 kg (125 lb 14 1 oz)   04/12/21 60 5 kg (133 lb 4 3 oz)   04/09/21 60 8 kg (134 lb)     · Suspected mild exacerbation due to elevated BNP And dyspnea likely due to rapid atrial fibrillation  · Last echocardiogram was obtained on 02/22/2021 which shows LVEF of 60%  Grade 1 diastolic dysfunction    · Cardiology input appreciated   ·  will give 2 doses of IV Lasix and will resume home does  ·  daily weight  andstrict intake and output        Other emphysema (HCC)  Assessment & Plan  · No acute exacerbation   · Continue with inhalers  · The patient is on chronic steroid  · Respiratory protocol      VTE Prophylaxis: Apixaban (Eliquis)  Code Status:  Full code  Discussion with family: Tammy Camejo- granddaughter via phone     Anticipated Length of Stay:  Patient will be admitted on an Inpatient basis with an anticipated length of stay of  > 2 midnights  Justification for Hospital Stay: afib with RVR     Chief Complaint:   Not feeling right and shortness of breath     History of Present Illness:    Mirta Bull is a 82 y o  female who presents with "not feeling right" and shortness of breath  Patient with severe COPD with chronic oxygen use, paroxysmal atrial fibrillation and diabetes mellitus type 2  The patient was sent from the [facility]  Today with complaint of increasing dyspnea and "not feeling right"  The patient reports that she had 3 episodes of non bloody bowel movements which is not normal for her  In the ER the patient was found to be in rapid atrial fibrillation heart rate as high as 140s  She received 1 dose of Cardizem 15 mg IV with much improvement and  Initiated on Cardizem drip  The patient denies any chest pain, palpitation, nausea, vomiting, abdominal pain  Currently she is feeling much improved  She denies any dyspnea now  Review of Systems:    Review of Systems   Constitutional: Negative for activity change, appetite change, chills, diaphoresis and fever  HENT: Negative for congestion, ear pain, hearing loss, tinnitus and trouble swallowing  Eyes: Negative for photophobia, pain, discharge, itching and visual disturbance  Respiratory: Positive for shortness of breath  Negative for cough, wheezing and stridor  Cardiovascular: Negative for chest pain, palpitations and leg swelling  Gastrointestinal: Positive for diarrhea  Negative for abdominal pain, blood in stool, constipation, nausea and vomiting  Endocrine: Negative for cold intolerance, heat intolerance, polydipsia, polyphagia and polyuria  Genitourinary: Negative for difficulty urinating, dysuria, frequency, hematuria and urgency  Musculoskeletal: Negative for back pain, gait problem and neck stiffness  Skin: Negative for pallor, rash and wound  Allergic/Immunologic: Negative for environmental allergies, food allergies and immunocompromised state  Neurological: Negative for dizziness, tremors, speech difficulty, weakness, light-headedness, numbness and headaches  Hematological: Negative for adenopathy  Does not bruise/bleed easily  Psychiatric/Behavioral: Negative for confusion, hallucinations and sleep disturbance  Past Medical and Surgical History:     Past Medical History:   Diagnosis Date    Allergies     Asthma     Chronic diastolic CHF (congestive heart failure) (HCC)     Colitis     Colon polyp     COPD (chronic obstructive pulmonary disease)     Diverticulosis     DJD (degenerative joint disease)     Gait abnormality     Hypertension     Paroxysmal atrial fibrillation (HCC)        Past Surgical History:   Procedure Laterality Date    BLADDER SURGERY      COLON SURGERY      COLONOSCOPY      COLONOSCOPY W/ POLYPECTOMY N/A 1/21/2019    Procedure: COLONOSCOPY W/ POLYPECTOMY;  Surgeon: Torrey Rashid MD;  Location: [address] [place] [address] GI LAB; Service: General    FL GUIDED NEEDLE PLAC BX/ASP/INJ  9/2/2020    FL GUIDED NEEDLE PLAC BX/ASP/INJ  1/21/2021    GASTROJEJUNOSTOMY W/ JEJUNOSTOMY TUBE N/A 2/3/2020    Procedure: Antrectomy with bilroth II Anastomosis;   Surgeon: Kristyn Shah MD;  Location:  MAIN OR;  Service: [facility] 9/2/2020    Procedure: BLOCK / INJECTION SACROILIAC;  Surgeon: Oscar Barriga MD;  Location: [facility];  Service: Pain Management     ID INJECTION,SACROILIAC JOINT Right 1/21/2021    Procedure: RIGHT SACROILIAC JOINT INJECTION;  Surgeon: Amari Casillas MD;  [facility];  Service: Pain Management     SMALL INTESTINE SURGERY  03/2020    with partial gastrectomy       Meds/Allergies:    Prior to Admission medications    Medication Sig Start Date End Date Taking?  Authorizing Provider   acetaminophen (TYLENOL) 325 mg tablet Take 650 mg by mouth every 4 (four) hours as needed for mild pain   Yes Historical Provider, MD   alendronate (FOSAMAX) 70 mg tablet take 1 tablet by mouth every 7 days 1/13/21  Yes Rosalia Vences DO   apixaban (ELIQUIS) 5 mg Take 1 tablet (5 mg total) by mouth 2 (two) times a day 3/2/21  Yes Rosalia Vences DO   Artificial Tear Solution (GENTEAL TEARS) 0 1-0 2-0 3 % SOLN Administer 1 drop to both eyes 3 (three) times a day 2/14/20  Yes Historical Provider, MD   budesonide-formoterol (Symbicort) 160-4 5 mcg/act inhaler Inhale 2 puffs 2 (two) times a day   Yes Historical Provider, MD   Calcium Carb-Cholecalciferol (CALCIUM 1000 + D PO) Take 1,000 mg by mouth daily 3/28/18  Yes Historical Provider, MD   cholecalciferol (VITAMIN D3) 400 units tablet Take 400 Units by mouth daily   Yes Historical Provider, MD   fluticasone-umeclidinium-vilanterol (Trelegy Ellipta) 100-62 5-25 MCG/INH inhaler Inhale 1 puff daily Rinse mouth after use  8/11/20  Yes Rosalia Vences DO   furosemide (LASIX) 20 mg tablet take 2 tablets by mouth twice a day 3/1/21  Yes Rosalia Vences DO   gabapentin (NEURONTIN) 300 mg capsule Take 1 capsule (300 mg total) by mouth 3 (three) times a day 4/7/21 5/7/21 Yes LEEROY Martinez   HYDROcodone-acetaminophen (NORCO) 5-325 mg per tablet Take 1 tablet by mouth every 6 (six) hours as needed for painMax Daily Amount: 4 tablets 4/9/21  Yes LEEROY Vo   hydrOXYzine HCL (ATARAX) 25 mg tablet Take 1 tablet (25 mg total) by mouth every 6 (six) hours as needed for anxiety 2/16/21  Yes David Diego, LEEROY   ipratropium-albuterol (DUO-NEB) 0 5-2 5 mg/3 mL nebulizer solution Take 1 vial (3 mL total) by nebulization every 6 (six) hours while awake 11/12/19  Yes Madeline Rueda PA-C   iron polysaccharides (Ferrex 150) 150 mg capsule Take 1 capsule (150 mg total) by mouth daily 3/2/21  Yes LEEROY Vo   lidocaine (LIDODERM) 5 % Apply 1 patch topically daily Remove & Discard patch within 12 hours or as directed by MD 4/8/21 5/8/21 Yes LEEROY Colbert   magnesium oxide (MAG-OX) 400 mg Take 1 tablet (400 mg total) by mouth 2 (two) times a day 4/14/21  Yes Frank Kaur MD   menthol-methyl salicylate (BENGAY) 02-01 % cream Apply topically 4 (four) times a day as needed (back pain) 4/7/21  Yes LEEROY Colbert   metFORMIN (GLUCOPHAGE) 500 mg tablet Take 1 tablet (500 mg total) by mouth 2 (two) times a day with meals 4/9/21 5/9/21 Yes LEEROY Vo   methocarbamol (ROBAXIN) 500 mg tablet Take 1 tablet (500 mg total) by mouth every 8 (eight) hours for 7 days  Patient taking differently: Take 500 mg by mouth 4 (four) times a day  4/7/21 4/25/21 Yes LEEROY Colbert   metoprolol tartrate (LOPRESSOR) 25 mg tablet take 1/2 tablet by mouth every 12 hours 3/11/21  Yes [facility], DO   montelukast (SINGULAIR) 10 mg tablet Take 1 tablet (10 mg total) by mouth daily at bedtime 3/2/21  Yes LEEROY Vo   OXYGEN-HELIUM IN [place]   Yes Historical Provider, MD   pantoprazole (PROTONIX) 40 mg tablet take 1 tablet by mouth twice a day 3/11/21  Yes [facility], DO   potassium chloride (K-DUR,KLOR-CON) 20 mEq tablet Take 2 tablets (40 mEq total) by mouth daily  Patient taking differently: Take 40 mEq by mouth 2 (two) times a day  1/3/21 4/25/21 Yes Rahul Cornejo PA-C   predniSONE 5 mg tablet Take 1 tablet (5 mg total) by mouth 2 (two) times a day with meals Twice daily 3/2/21 8/29/21 Yes LEEROY Vo   psyllium (METAMUCIL) packet Take 1 packet by mouth 3 (three) times a day 19  Yes Morenita Perry PA-C   simethicone (MYLICON) 80 mg chewable tablet Chew 1 tablet (80 mg total) every 6 (six) hours as needed for flatulence 20  Yes LEEROY Vick   sitaGLIPtin (JANUVIA) 50 mg tablet Take 1 tablet (50 mg total) by mouth daily 21 Yes LEEROY Keita   theophylline (CORTNEY-24) 200 MG 24 hr capsule Take 1 capsule (200 mg total) by mouth daily 20  Yes [employer], DO   polyethylene glycol (MIRALAX) 17 g packet Take 17 g by mouth daily 1/3/21 2/23/21  Lucila Ren PA-C   iron polysaccharides (FERREX) 150 mg capsule Take 1 capsule (150 mg total) by mouth 2 (two) times a day 20   [employer]loan [employer], DO     all medications and allergies reviewed    Allergies: Allergies   Allergen Reactions    Bee Venom Shortness Of Breath    Fluticasone      Per pt  error       Social History:     Marital Status:     Occupation:     Patient Pre-hospital Living Situation:  alone  Patient Pre-hospital Level of Mobility:  assisted  Patient Pre-hospital Diet Restrictions:  diabetic  Substance Use History:   Social History     Substance and Sexual Activity   Alcohol Use Never    Alcohol/week: 0 0 standard drinks    Frequency: Never    Drinks per session: Patient refused    Binge frequency: Never     Social History     Tobacco Use   Smoking Status Former Smoker    Quit date: 2013    Years since quittin 4   Smokeless Tobacco Never Used     Social History     Substance and Sexual Activity   Drug Use Never       Family History:  I have reviewed the patient's family history    Physical Exam:     Vitals:   Blood Pressure: 105/64 (21 1125)  Pulse: (!) 112 (21 112)  Temperature: 98 9 °F (37 2 °C) (21 112)  Temp Source: Tympanic (21)  Respirations: (!) 23 (21 112)  Height: 5' (152 4 cm) (21)  Weight - Scale: 57 1 kg (125 lb 14 1 oz) (21 1125)  SpO2: 96 % (21)    Physical Exam  Vitals signs and nursing note reviewed  Constitutional:       General: She is not in acute distress  Appearance: Normal appearance  HENT:      Head: Normocephalic and atraumatic  Right Ear: External ear normal       Left Ear: External ear normal       Nose: Nose normal  No rhinorrhea  Mouth/Throat:      Mouth: Mucous membranes are moist       Pharynx: Oropharynx is clear  Eyes:      General:         Right eye: No discharge  Left eye: No discharge  Pupils: Pupils are equal, round, and reactive to light  Neck:      Musculoskeletal: Normal range of motion and neck supple  No muscular tenderness  Cardiovascular:      Rate and Rhythm: Tachycardia present  Rhythm irregular  Pulses: Normal pulses  Heart sounds: Normal heart sounds  No murmur  Pulmonary:      Effort: Pulmonary effort is normal  No respiratory distress  Breath sounds: Rhonchi present  Abdominal:      General: Bowel sounds are normal  There is no distension  Palpations: Abdomen is soft  There is no mass  Tenderness: There is no abdominal tenderness  Musculoskeletal: Normal range of motion  General: No swelling or tenderness  Skin:     General: Skin is warm and dry  Capillary Refill: Capillary refill takes less than 2 seconds  Findings: No erythema or rash  Neurological:      General: No focal deficit present  Mental Status: She is alert and oriented to person, place, and time  Mental status is at baseline  Psychiatric:         Mood and Affect: Mood normal          Behavior: Behavior normal          Thought Content: Thought content normal          Judgment: Judgment normal          Additional Data:     Lab Results: I have personally reviewed pertinent reports        Results from last 7 days   Lab Units 04/25/21  0702   WBC Thousand/uL 8 30   HEMOGLOBIN g/dL 10 6*   HEMATOCRIT % 34 1*   PLATELETS Thousands/uL 376   BANDS PCT % 4   LYMPHO PCT % 19*   MONO PCT % 7     Results from last 7 days   Lab Units 04/25/21  0702   SODIUM mmol/L 139   POTASSIUM mmol/L 3 0*   CHLORIDE mmol/L 97*   CO2 mmol/L 33*   BUN mg/dL 7   CREATININE mg/dL 0 68   ANION GAP mmol/L 9   CALCIUM mg/dL 8 5*   GLUCOSE RANDOM mg/dL 116*     Results from last 7 days   Lab Units 04/25/21  0702   INR  1 10     Results from last 7 days   Lab Units 04/25/21  1332 04/25/21  0444 04/24/21  1715 04/24/21  0427 04/23/21  0422 04/22/21  1611 04/22/21  0415 04/21/21  1637 04/21/21  0502   POC GLUCOSE mg/dl 207* 98 151* 102 99 115 104 122 113               Imaging: I have personally reviewed pertinent reports  XR chest 1 view portable    (Results Pending)         EKG, Pathology, and Other Studies Reviewed on Admission:   ·  echocardiogram 02/22/2021 shows LVEF of 60% with grade 1 diastolic dysfunction    Epic / Care Everywhere Records Reviewed: Yes    ** Please Note: This note has been constructed using a voice recognition system   **

## 2021-04-25 NOTE — ASSESSMENT & PLAN NOTE
· No acute exacerbation   · Continue with inhalers  · The patient is on chronic steroid  · Respiratory protocol

## 2021-04-25 NOTE — ASSESSMENT & PLAN NOTE
Wt Readings from Last 3 Encounters:   04/25/21 57 1 kg (125 lb 14 1 oz)   04/12/21 60 5 kg (133 lb 4 3 oz)   04/09/21 60 8 kg (134 lb)     · Suspected mild exacerbation due to elevated BNP And dyspnea likely due to rapid atrial fibrillation  · Last echocardiogram was obtained on 02/22/2021 which shows LVEF of 60%  Grade 1 diastolic dysfunction    · Cardiology input appreciated   ·  will give 2 doses of IV Lasix and will resume home does  ·  daily weight  andstrict intake and output

## 2021-04-25 NOTE — ASSESSMENT & PLAN NOTE
·  Noted heart rate in the ER of 140s  · Associated with dyspnea and "not feeling right"  · Currently is on Cardizem drip with much controlled heart rate  The patient received 1 dose of IV diltiazem 15 mg in the ED  · Cardiology input appreciated   · Cardiology has started the patient on oral diltiazem 30 mg q 6 hours  Continue with metoprolol 12 5 mg b i d     Will titrate off diltiazem drip as able  ·  continue with apixaban for anticoagulation

## 2021-04-25 NOTE — CONSULTS
[address] 17 1935, 82 y o  female MRN: [id number]  Unit/Bed#: ICU 06 Encounter: [id number]  Primary Care Provider: LEEROY Mckinney   Date and time admitted to hospital: 4/25/2021  6:51 AM    Inpatient consult to Cardiology  Consult performed by: LEEROY Stewart  Consult ordered by: Josué Berman MD          Atrial fibrillation [facility])  Assessment & Plan  Presently with rapid ventricular response-  Trop neg, continue to trend  Check TSH  Having diarrhea, mild lyte abnormalities noted with replacement in progress  Follow BMP  Observe for signs of infection; presently afebrile, normal wbc  15 mg Cardizem bolus in the ER followed by Cardizem drip  Initiate cardizem PO 60 mg Q 6 hours and titrate drip to off  Continue outpatient metoprolol, 12 5 mg b i d -caution with increasing given her severe COPD  Was previously on Cardizem 240 mg but this was discontinued in 4/20 for unknown reasons  Eliquis for stroke risk reduction    Congestive heart failure (HCC)  Assessment & Plan  Wt Readings from Last 3 Encounters:   04/25/21 57 1 kg (125 lb 14 1 oz)   04/12/21 60 5 kg (133 lb 4 3 oz)   04/09/21 60 8 kg (134 lb)     HFpEF likely related to afib RVR  CXR pending but rales noted on exam, BNP elevated  Will give lasix IV x 2 doses and reassess        COPD (chronic obstructive pulmonary disease) ([facility] [address] )  Assessment & Plan  Chronically on supplemental oxygen  Continue outpatient medicines per medical team    Other summary comments: Will initiate PO Cardizem and continue to wean drip as able  Continue home metoprolol, 12 5 mg b i d   Continue to trend troponin to rule out ischemic causes, await chest x-ray and follow white count for potential infection  Will order TSH to evaluate thyroid function  BNP is noted to be elevated    It appears that her weight has gradually declined over the course of this year, therefore assessment of her weights may not be beneficial   On exam, she does have rales, but no edema is noted  Will give Lasix IV 40 mg x 2 doses and reassess  Outpatient Cardiologist: Dr Maral Christensen    HPI: Liam Garzon is a 82y o  year old female who presented with a complaint of not feeling well and diarrhea  Rosa Cummings is known to me from the outpatient setting and prior hospitalizations  She has a known history of AFib, oxygen-dependent COPD, chronic pain  She additionally had multiple surgeries for gastric outlet obstruction (2/2020)  She had 2 hospitalizations earlier this month for ambulatory dysfunction and back pain  She has known compression fractures in the lumbar spine  She is followed by pain management  She is currently under going rehab at [facility]  She notes that she was just not feeling right this morning  She also notes that in the past day or so she had several episodes of diarrhea  It is for these reasons that she presented to the emergency room  In the emergency room, she was noted to be in AFib with a rapid ventricular response in the 140s  She received 15 mg of IV Cardizem and was started on a Cardizem drip  Solu-Medrol was given intravenously given her history of COPD  At the time of my evaluation, she is resting comfortably in bed  She denies any changes in her breathing from her baseline  She denies any chest pain, pressure, tightness, palpitations  She has not experienced any lightheadedness or dizziness  EKG: Afib RVR    MOST  RECENT CARDIAC IMAGING:   Echo 02/22/2021 EF 60%, mild LVH, mild diastolic dysfunction  Mild to moderate AI  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 9 cm    Echo 10/7/2019 EF 60% mild LVH, mild DD  Mild AI      Review of Systems: a 10 point review of systems was conducted and is negative except for as mentioned in the HPI or as below          Historical Information   Past Medical History:   Diagnosis Date    Allergies     Asthma     Chronic diastolic CHF (congestive heart failure) (HCC)     Colitis     Colon polyp     COPD (chronic obstructive pulmonary disease)     Diverticulosis     DJD (degenerative joint disease)     Gait abnormality     Hypertension     Paroxysmal atrial fibrillation (HCC)      Past Surgical History:   Procedure Laterality Date    BLADDER SURGERY      COLON SURGERY      COLONOSCOPY      COLONOSCOPY W/ POLYPECTOMY N/A 2019    Procedure: COLONOSCOPY W/ POLYPECTOMY;  Surgeon: Sammy Alvarez MD;  Location: [address] [place] [address] GI LAB; Service: General    FL GUIDED NEEDLE PLAC BX/ASP/INJ  2020    FL GUIDED NEEDLE PLAC BX/ASP/INJ  2021    GASTROJEJUNOSTOMY W/ JEJUNOSTOMY TUBE N/A 2/3/2020    Procedure: Antrectomy with bilroth II Anastomosis;   Surgeon: Antwon Beverly MD;  Location:  MAIN OR;  Service: General   Ardie Sjogren JOINT Right 2020    Procedure: BLOCK / INJECTION SACROILIAC;  Surgeon: Santo Weinstein MD;  Location: [facility];  Service: Pain Management     [place] INJECTION,SACROILIAC JOINT Right 2021    Procedure: RIGHT SACROILIAC JOINT INJECTION;  Surgeon: Santo Weinstein MD;  Location: [facility];  Service: Pain Management     SMALL INTESTINE SURGERY  2020    with partial gastrectomy     Social History     Substance and Sexual Activity   Alcohol Use Never    Alcohol/week: 0 0 standard drinks    Frequency: Never    Drinks per session: Patient refused    Binge frequency: Never     Social History     Substance and Sexual Activity   Drug Use Never     Social History     Tobacco Use   Smoking Status Former Smoker    Quit date: 2013    Years since quittin 4   Smokeless Tobacco Never Used       Family History:   No longer pertinent due to patient age    Meds/Allergies   all current active meds have been reviewed  Medications Prior to Admission   Medication    acetaminophen (TYLENOL) 325 mg tablet    alendronate (FOSAMAX) 70 mg tablet    apixaban (ELIQUIS) 5 mg    Artificial Tear Solution (GENTEAL TEARS) 0 1-0 2-0 3 % SOLN    budesonide-formoterol (Symbicort) 160-4 5 mcg/act inhaler    Calcium Carb-Cholecalciferol (CALCIUM 1000 + D PO)    cholecalciferol (VITAMIN D3) 400 units tablet    fluticasone-umeclidinium-vilanterol (Trelegy Ellipta) 100-62 5-25 MCG/INH inhaler    furosemide (LASIX) 20 mg tablet    gabapentin (NEURONTIN) 300 mg capsule    HYDROcodone-acetaminophen (NORCO) 5-325 mg per tablet    hydrOXYzine HCL (ATARAX) 25 mg tablet    ipratropium-albuterol (DUO-NEB) 0 5-2 5 mg/3 mL nebulizer solution    iron polysaccharides (Ferrex 150) 150 mg capsule    lidocaine (LIDODERM) 5 %    magnesium oxide (MAG-OX) 400 mg    menthol-methyl salicylate (BENGAY) 88-60 % cream    metFORMIN (GLUCOPHAGE) 500 mg tablet    methocarbamol (ROBAXIN) 500 mg tablet    metoprolol tartrate (LOPRESSOR) 25 mg tablet    montelukast (SINGULAIR) 10 mg tablet    OXYGEN-HELIUM IN    pantoprazole (PROTONIX) 40 mg tablet    potassium chloride (K-DUR,KLOR-CON) 20 mEq tablet    predniSONE 5 mg tablet    psyllium (METAMUCIL) packet    simethicone (MYLICON) 80 mg chewable tablet    sitaGLIPtin (JANUVIA) 50 mg tablet    theophylline (CORTNEY-24) 200 MG 24 hr capsule    polyethylene glycol (MIRALAX) 17 g packet       Allergies   Allergen Reactions    Bee Venom Shortness Of Breath    Fluticasone      Per pt  error       Objective   Vitals: Blood pressure 105/64, pulse (!) 112, temperature 98 9 °F (37 2 °C), temperature source Tympanic, resp   rate (!) 23, height 5' (1 524 m), weight 57 1 kg (125 lb 14 1 oz), SpO2 96 %, not currently breastfeeding , Body mass index is 24 58 kg/m² ,   Orthostatic Blood Pressures      Most Recent Value   Blood Pressure  105/64 filed at 04/25/2021 1125   Patient Position - Orthostatic VS  Lying filed at 04/25/2021 7957          Systolic (83PBG), NFV:287 , Min:105 , CYW:123     Diastolic (13MVM), FDN:17, Min:60, Max:81      Physical Exam:    General:  Normal appearance in no distress  Eyes:  Anicteric  Oral mucosa:  Moist   Neck:  No JVD  Carotid upstrokes are brisk without bruits  No masses  Chest:  Fine rales in the left base, coarse breath sounds throughout  Cardiac:  Irregularly irregular  No palpable PMI  Normal S1 and S2  No murmur gallop or rub  Abdomen:  Soft and nontender  No palpable organomegaly or aortic enlargement  Extremities:  Trace lower extremity edema bilaterally  Musculoskeletal:  Symmetric  Vascular:   Pedal pulses are intact  Neuro:  Grossly symmetric  Psych:  Alert and oriented x3          Lab Results:     Troponins:   Results from last 7 days   Lab Units 04/25/21  0702   TROPONIN I ng/mL 0 03     BNP:   Results from last 6 Months   Lab Units 04/25/21  0702 04/03/21  1250   BNP pg/mL 476* 158*       CBC :   Results from last 7 days   Lab Units 04/25/21  0702   WBC Thousand/uL 8 30   HEMOGLOBIN g/dL 10 6*   HEMATOCRIT % 34 1*   MCV fL 72*   PLATELETS Thousands/uL 376     TSH:     CMP:   Results from last 7 days   Lab Units 04/25/21  0702   POTASSIUM mmol/L 3 0*   CHLORIDE mmol/L 97*   CO2 mmol/L 33*   BUN mg/dL 7   CREATININE mg/dL 0 68   EGFR ml/min/1 73sq m 81     Lipid Profile:     Coags:   Results from last 7 days   Lab Units 04/25/21  0702   INR  1 10

## 2021-04-25 NOTE — ASSESSMENT & PLAN NOTE
Lab Results   Component Value Date    HGBA1C 12 6 (A) 04/09/2021       Recent Labs     04/23/21  0422 04/24/21  0427 04/24/21  1715 04/25/21  0444   POCGLU 99 102 151* 98       Blood Sugar Average: Last 72 hrs:  · will hold off oral anti diabetes medication from home   · Placed on insulin sliding scale

## 2021-04-25 NOTE — ASSESSMENT & PLAN NOTE
Presently with rapid ventricular response-  Trop neg, continue to trend  Check TSH  Having diarrhea, mild lyte abnormalities noted with replacement in progress  Follow BMP  Observe for signs of infection; presently afebrile, normal wbc  15 mg Cardizem bolus in the ER followed by Cardizem drip  Initiate cardizem PO 60 mg Q 6 hours and titrate drip to off  Continue outpatient metoprolol, 12 5 mg b i d -caution with increasing given her severe COPD  Was previously on Cardizem 240 mg but this was discontinued in 4/20 for unknown reasons      Eliquis for stroke risk reduction

## 2021-04-25 NOTE — ED NOTES
Patient reports burning sensation from IV potassium  Rate slowed at this time  Dr Ly Grove notified  Per Dr Ly Grove, potassium may be given with IV fluids to ease sensation   Patient resting comfortably at this time     Darya Georges RN  04/25/21 1918

## 2021-04-25 NOTE — NURSING NOTE
Received to icu #6, dx VICKI  Pt awake, alert, and dyspneic at rest and on exertion  96% on 3l n/c  Lungs with scattered rhonchi and crackles  Moist NP cough noted  Assisted to bed and placed on icu monitors  Assessment as noted in computer charting  Repos for comfort  Oriented to room and unit  Safety in place

## 2021-04-25 NOTE — ED NOTES
Patient requested that her granddaughter Gustavo Dean be updated  Gustavo Dean contacted at ([phone]) and given an update regarding patient status        Aneta Closs, RN  04/25/21 9056

## 2021-04-25 NOTE — ASSESSMENT & PLAN NOTE
Wt Readings from Last 3 Encounters:   04/25/21 57 1 kg (125 lb 14 1 oz)   04/12/21 60 5 kg (133 lb 4 3 oz)   04/09/21 60 8 kg (134 lb)     HFpEF likely related to afib RVR  CXR pending but rales noted on exam, BNP elevated  Will give lasix IV x 2 doses and reassess

## 2021-04-25 NOTE — ED PROVIDER NOTES
History  Chief Complaint   Patient presents with    Rapid Heart Rate     hr 150-180, diarrhea yesterday     HPI  This 17-year-old female with history of chronic AFib COPD on 3 L oxygen type 2 diabetes diastolic heart failure presented to ED from [facility] with the complaining of a funny feeling in my body   Patient states that she was placed in a quarantine status because she just went to [facility] and was feeling overwhelmed by a being alone and wanted to get out  Patient states that she had 3 episodes of diarrhea  Upon arrival to the ED patient oxygen saturation was 83% which was due to not being on her normal 3 L after she was placed on 3 L nasal cannula which she normally uses at the nursing home oxygen saturation was up to 95%  Patient is denying any chest pain or shortness of breath at this time  She denies any abdominal pain nausea vomiting does complain of diarrhea x3  Patient is afebrile in the ED  Prior to Admission Medications   Prescriptions Last Dose Informant Patient Reported? Taking? Artificial Tear Solution (GENTEAL TEARS) 0 1-0 2-0 3 % SOLN  Self Yes No   Sig: Administer 1 drop to both eyes 3 (three) times a day   Calcium Carb-Cholecalciferol (CALCIUM 1000 + D PO)  Self Yes No   Sig: Take 1,000 mg by mouth daily   HYDROcodone-acetaminophen (NORCO) 5-325 mg per tablet   No No   Sig: Take 1 tablet by mouth every 6 (six) hours as needed for painMax Daily Amount: 4 tablets   OXYGEN-HELIUM IN  Self Yes No   Sig: Inhale   alendronate (FOSAMAX) 70 mg tablet   No No   Sig: take 1 tablet by mouth every 7 days   apixaban (ELIQUIS) 5 mg   No No   Sig: Take 1 tablet (5 mg total) by mouth 2 (two) times a day   budesonide-formoterol (Symbicort) 160-4 5 mcg/act inhaler  Self Yes No   Sig: Inhale 2 puffs 2 (two) times a day   fluticasone-umeclidinium-vilanterol (Trelegy Ellipta) 100-62 5-25 MCG/INH inhaler  Self No No   Sig: Inhale 1 puff daily Rinse mouth after use     furosemide (LASIX) 20 mg tablet   No No   Sig: take 2 tablets by mouth twice a day   gabapentin (NEURONTIN) 300 mg capsule   No No   Sig: Take 1 capsule (300 mg total) by mouth 3 (three) times a day   hydrOXYzine HCL (ATARAX) 25 mg tablet   No No   Sig: Take 1 tablet (25 mg total) by mouth every 6 (six) hours as needed for anxiety   ipratropium-albuterol (DUO-NEB) 0 5-2 5 mg/3 mL nebulizer solution  Self No No   Sig: Take 1 vial (3 mL total) by nebulization every 6 (six) hours while awake   iron polysaccharides (Ferrex 150) 150 mg capsule   No No   Sig: Take 1 capsule (150 mg total) by mouth daily   lidocaine (LIDODERM) 5 %   No No   Sig: Apply 1 patch topically daily Remove & Discard patch within 12 hours or as directed by MD   magnesium oxide (MAG-OX) 400 mg   No No   Sig: Take 1 tablet (400 mg total) by mouth 2 (two) times a day   menthol-methyl salicylate (BENGAY) 02-92 % cream   No No   Sig: Apply topically 4 (four) times a day as needed (back pain)   metFORMIN (GLUCOPHAGE) 500 mg tablet   No No   Sig: Take 1 tablet (500 mg total) by mouth 2 (two) times a day with meals   methocarbamol (ROBAXIN) 500 mg tablet   No No   Sig: Take 1 tablet (500 mg total) by mouth every 8 (eight) hours for 7 days   metoprolol tartrate (LOPRESSOR) 25 mg tablet   No No   Sig: take 1/2 tablet by mouth every 12 hours   montelukast (SINGULAIR) 10 mg tablet   No No   Sig: Take 1 tablet (10 mg total) by mouth daily at bedtime   pantoprazole (PROTONIX) 40 mg tablet   No No   Sig: take 1 tablet by mouth twice a day   polyethylene glycol (MIRALAX) 17 g packet   No No   Sig: Take 17 g by mouth daily   potassium chloride (K-DUR,KLOR-CON) 20 mEq tablet   No No   Sig: Take 2 tablets (40 mEq total) by mouth daily   predniSONE 5 mg tablet   No No   Sig: Take 1 tablet (5 mg total) by mouth 2 (two) times a day with meals Twice daily   psyllium (METAMUCIL) packet  Self No No   Sig: Take 1 packet by mouth 3 (three) times a day   simethicone (MYLICON) 80 mg chewable tablet  Self No No   Sig: Chew 1 tablet (80 mg total) every 6 (six) hours as needed for flatulence   sitaGLIPtin (JANUVIA) 50 mg tablet   No No   Sig: Take 1 tablet (50 mg total) by mouth daily   theophylline (CORTNEY-24) 200 MG 24 hr capsule  Self No No   Sig: Take 1 capsule (200 mg total) by mouth daily      Facility-Administered Medications: None       Past Medical History:   Diagnosis Date    Allergies     Asthma     Chronic diastolic CHF (congestive heart failure) (HCC)     Colitis     Colon polyp     COPD (chronic obstructive pulmonary disease)     Diverticulosis     DJD (degenerative joint disease)     Gait abnormality     Hypertension     Paroxysmal atrial fibrillation (HCC)        Past Surgical History:   Procedure Laterality Date    BLADDER SURGERY      COLON SURGERY      COLONOSCOPY      COLONOSCOPY W/ POLYPECTOMY N/A 1/21/2019    Procedure: COLONOSCOPY W/ POLYPECTOMY;  Surgeon: Dean Dwyer MD;  Location: [address] [place] [address] GI LAB; Service: General    FL GUIDED NEEDLE PLAC BX/ASP/INJ  9/2/2020    FL GUIDED NEEDLE PLAC BX/ASP/INJ  1/21/2021    GASTROJEJUNOSTOMY W/ JEJUNOSTOMY TUBE N/A 2/3/2020    Procedure: Antrectomy with bilroth II Anastomosis; Surgeon: Zeina Feliz MD;  Location:  MAIN OR;  Service: General   Karen Job JOINT Right 9/2/2020    Procedure: BLOCK / INJECTION SACROILIAC;  Surgeon: Carlota Chowdhury MD;  Location: [facility];  Service: Pain Management     MN INJECTION,SACROILIAC JOINT Right 1/21/2021    Procedure: RIGHT SACROILIAC JOINT INJECTION;  Surgeon: Carlota Cohwdhury MD;  Location: [facility];  Service: Pain Management     SMALL INTESTINE SURGERY  03/2020    with partial gastrectomy       Family History   Problem Relation Age of Onset    Heart disease Mother      I have reviewed and agree with the history as documented      E-Cigarette/Vaping    E-Cigarette Use Never User      E-Cigarette/Vaping Substances    Nicotine No     THC No     CBD No     Flavoring No     Other No     Unknown No      Social History     Tobacco Use    Smoking status: Former Smoker     Quit date: 2013     Years since quittin 4    Smokeless tobacco: Never Used   Substance Use Topics    Alcohol use: Never     Alcohol/week: 0 0 standard drinks     Frequency: Never     Drinks per session: Patient refused     Binge frequency: Never    Drug use: Never       Review of Systems   Constitutional: Negative  HENT: Negative  Eyes: Negative  Respiratory: Positive for wheezing  Cardiovascular: Negative  Gastrointestinal: Positive for diarrhea  Negative for abdominal pain, nausea and vomiting  Endocrine: Negative  Genitourinary: Negative  Musculoskeletal: Negative  Skin: Negative  Allergic/Immunologic: Negative  Neurological: Negative  Psychiatric/Behavioral: The patient is nervous/anxious  Physical Exam  Physical Exam  Vitals signs and nursing note reviewed  Constitutional:       Appearance: Normal appearance  HENT:      Head: Normocephalic and atraumatic  Nose: Nose normal  No congestion  Eyes:      Extraocular Movements: Extraocular movements intact  Conjunctiva/sclera: Conjunctivae normal       Pupils: Pupils are equal, round, and reactive to light  Neck:      Musculoskeletal: Normal range of motion and neck supple  Cardiovascular:      Rate and Rhythm: Normal rate and regular rhythm  Pulmonary:      Breath sounds: Wheezing present  Abdominal:      General: Bowel sounds are normal  There is no distension  Palpations: Abdomen is soft  Musculoskeletal: Normal range of motion  Skin:     General: Skin is warm  Neurological:      General: No focal deficit present  Mental Status: She is alert and oriented to person, place, and time  Psychiatric:         Mood and Affect: Mood is anxious           Vital Signs  ED Triage Vitals   Temperature Pulse Respirations Blood Pressure SpO2   21 0658 04/25/21 0730 04/25/21 0658 04/25/21 0658 04/25/21 0658   97 7 °F (36 5 °C) (!) 146 (!) 28 122/73 (!) 82 %      Temp src Heart Rate Source Patient Position - Orthostatic VS BP Location FiO2 (%)   -- 04/25/21 0730 04/25/21 0730 04/25/21 0730 --    Monitor Lying Left arm       Pain Score       --                  Vitals:    04/25/21 0658 04/25/21 0730 04/25/21 0800 04/25/21 0830   BP: 122/73 105/65 105/68 111/60   Pulse:  (!) 146 (!) 176 (!) 180   Patient Position - Orthostatic VS:  Lying Lying          Visual Acuity      ED Medications  Medications   albuterol inhalation solution 2 5 mg (0 mg Nebulization Hold 4/25/21 0726)   potassium chloride 20 mEq IVPB (premix) (20 mEq Intravenous New Bag 4/25/21 0820)   methylPREDNISolone sodium succinate (Solu-MEDROL) injection 125 mg (125 mg Intravenous Given 4/25/21 0726)   diltiazem (CARDIZEM) injection 15 mg (15 mg Intravenous Given 4/25/21 0810)   potassium chloride (K-DUR,KLOR-CON) CR tablet 40 mEq (40 mEq Oral Given 4/25/21 0832)   diltiazem (CARDIZEM) 125 mg in sodium chloride 0 9 % 125 mL infusion (7 5 mg/hr Intravenous Rate/Dose Change 4/25/21 0923)       Diagnostic Studies  Results Reviewed     Procedure Component Value Units Date/Time    Novel Coronavirus (Covid-19),PCR [facility] - 2 Hour Stat [[id number]]  (Normal) Collected: 04/25/21 0702    Lab Status: Final result Specimen: Nares from Nose Updated: 04/25/21 0807     SARS-CoV-2 Negative    Narrative: The specimen collection materials, transport medium, and/or testing methodology utilized in the production of these test results have been proven to be reliable in a limited validation with an abbreviated program under the Emergency Utilization Authorization provided by the FDA  Testing reported as "Presumptive positive" will be confirmed with secondary testing to ensure result accuracy    Clinical caution and judgement should be used with the interpretation of these results with consideration of the clinical impression and other laboratory testing  Testing reported as "Positive" or "Negative" has been proven to be accurate according to standard laboratory validation requirements  All testing is performed with control materials showing appropriate reactivity at standard intervals        Manual Differential (Non Wam) [[id number]]  (Abnormal) Collected: 04/25/21 0702    Lab Status: Final result Specimen: Blood from Arm, Right Updated: 04/25/21 0745     Segmented % 68 %      Bands % 4 %      Lymphocytes % 19 %      Monocytes % 7 %      Myelocytes % 2 %      Neutrophils Absolute 5 98 Thousand/uL      Lymphocytes Absolute 1 58 Thousand/uL      Monocytes Absolute 0 58 Thousand/uL      Total Counted 100     RBC Morphology abnormal     Anisocytosis Present     Microcytes Present     Ovalocytes Present     Platelet Estimate Adequate     Giant PLTs Present    B-Type Natriuretic Peptide [facility] ONLY) [[id number]]  (Abnormal) Collected: 04/25/21 0702    Lab Status: Final result Specimen: Blood from Arm, Right Updated: 04/25/21 0735      pg/mL     Troponin I [[id number]]  (Normal) Collected: 04/25/21 0702    Lab Status: Final result Specimen: Blood from Arm, Right Updated: 04/25/21 0730     Troponin I 0 03 ng/mL     Basic metabolic panel [[id number]]  (Abnormal) Collected: 04/25/21 0702    Lab Status: Final result Specimen: Blood from Arm, Right Updated: 04/25/21 0728     Sodium 139 mmol/L      Potassium 3 0 mmol/L      Chloride 97 mmol/L      CO2 33 mmol/L      ANION GAP 9 mmol/L      BUN 7 mg/dL      Creatinine 0 68 mg/dL      Glucose 116 mg/dL      Calcium 8 5 mg/dL      eGFR 81 ml/min/1 73sq m     Narrative:      Kaylyn guidelines for Chronic Kidney Disease (CKD):     Stage 1 with normal or high GFR (GFR > 90 mL/min/1 73 square meters)    Stage 2 Mild CKD (GFR = 60-89 mL/min/1 73 square meters)    Stage 3A Moderate CKD (GFR = 45-59 mL/min/1 73 square meters)    Stage 3B Moderate CKD (GFR = 30-44 mL/min/1 73 square meters)    Stage 4 Severe CKD (GFR = 15-29 mL/min/1 73 square meters)    Stage 5 End Stage CKD (GFR <15 mL/min/1 73 square meters)  Note: GFR calculation is accurate only with a steady state creatinine    Protime-INR [[id number]]  (Normal) Collected: 04/25/21 0702    Lab Status: Final result Specimen: Blood from Arm, Right Updated: 04/25/21 0722     Protime 14 1 seconds      INR 1 10    CBC and differential [[id number]]  (Abnormal) Collected: 04/25/21 0702    Lab Status: Final result Specimen: Blood from Arm, Right Updated: 04/25/21 0720     WBC 8 30 Thousand/uL      RBC 4 74 Million/uL      Hemoglobin 10 6 g/dL      Hematocrit 34 1 %      MCV 72 fL      MCH 22 5 pg      MCHC 31 3 g/dL      RDW 19 9 %      MPV 7 3 fL      Platelets 826 Thousands/uL                  XR chest 1 view portable    (Results Pending)              Procedures  CriticalCare Time  Performed by: Greyson Renee MD  Authorized by: Greyson Renee MD     Critical care provider statement:     Critical care time (minutes):  40    Critical care start time:  4/25/2021 7:10 AM    Critical care end time:  4/25/2021 8:40 AM    Critical care time was exclusive of:  Separately billable procedures and treating other patients and teaching time    Critical care was necessary to treat or prevent imminent or life-threatening deterioration of the following conditions:  Cardiac failure, dehydration, metabolic crisis and respiratory failure    Critical care was time spent personally by me on the following activities:  Blood draw for specimens, obtaining history from patient or surrogate, development of treatment plan with patient or surrogate, discussions with consultants, discussions with primary provider, evaluation of patient's response to treatment, examination of patient, review of old charts, re-evaluation of patient's condition, ordering and review of radiographic studies, ordering and review of laboratory studies, ordering and performing treatments and interventions and interpretation of cardiac output measurements    I assumed direction of critical care for this patient from another provider in my specialty: no               ED Course  ED Course as of Apr 25 0932   Sun Apr 25, 2021   [address] Text Dr Karen Amato, will admit       7753 Patient her able to 180  Given Cardizem 15 mg IV x1 started on a Cardizem drip  No change in her rate  Otherwise comfortable no chest pain  SBIRT 20yo+      Most Recent Value   SBIRT (22 yo +)   In order to provide better care to our patients, we are screening all of our patients for alcohol and drug use  Would it be okay to ask you these screening questions? Yes Filed at: 04/25/2021 4359   Initial Alcohol Screen: US AUDIT-C    1  How often do you have a drink containing alcohol?  0 Filed at: 04/25/2021 0727   2  How many drinks containing alcohol do you have on a typical day you are drinking? 0 Filed at: 04/25/2021 0727   3a  Male UNDER 65: How often do you have five or more drinks on one occasion? 0 Filed at: 04/25/2021 0727   3b  FEMALE Any Age, or MALE 65+: How often do you have 4 or more drinks on one occassion? 0 Filed at: 04/25/2021 0727   Audit-C Score  0 Filed at: 04/25/2021 8672   ILIA: How many times in the past year have you    Used an illegal drug or used a prescription medication for non-medical reasons?   Never Filed at: 04/25/2021 8382                    MDM    Disposition  Final diagnoses:   Atrial fibrillation with rapid ventricular response ([address] )   Palpitations   Hypokalemia   COPD exacerbation ([address] )     Time reflects when diagnosis was documented in both MDM as applicable and the Disposition within this note     Time User Action Codes Description Comment    4/25/2021  8:43 AM Lou Uribe [I48 91] Atrial fibrillation with rapid ventricular response ([address] )     4/25/2021  8:43 AM Lou Barrientos Add [R00 2] Palpitations     4/25/2021  8:43 AM Neal Deter [E87 6] Hypokalemia     4/25/2021  9:32 AM Tamanna Uribe [J44 1] COPD exacerbation [facility])       ED Disposition     ED Disposition Condition Date/Time Comment    Admit Stable Sun Apr 25, 2021  8:43 AM Case was discussed with  Sarath Echavarria and the patient's admission status was agreed to be Admission Status: inpatient status to the service of Dr Alana Dumont   Follow-up Information    None         Patient's Medications   Discharge Prescriptions    No medications on file     No discharge procedures on file      PDMP Review       Value Time User    PDMP Reviewed  Yes 4/9/2021  9:23 AM [facility]          ED Provider  Electronically Signed by           Bard Piedad MD  04/25/21 6377

## 2021-04-26 PROBLEM — D50.9 IRON DEFICIENCY ANEMIA: Status: ACTIVE | Noted: 2021-01-01

## 2021-04-26 NOTE — CASE MANAGEMENT
Evaluated the pt at the bedside  PT was admitted to ICU for AFIB  Pt is a 30 day readmit  Pt was recently in this hospital for AFIB and DC 4/14/21  Pt refused to go to STR upon that DC, went home with [facility] and was placed from home to [facility] for STR  Currently pt is receiving ADL and IADL assistance from the SNF  TC to NEISHA Thomas to determine if the pt will be a bedhold at [facility]   Pt is chronically on 3L of oxygen  Pt has a granddaughter she would like to be informed in case "anything happens to her"  Her name is Ramírez Echols at [phone], she lives in [place]  Will await to hear from [facility]  Pt will need a PT/OT evaluation when stable  Patient/caregiver received discharge checklist   Content reviewed  Patient/caregiver encouraged to participate in discharge plan of care prior to discharge home  CM reviewed d/c planning process including the following: identifying help at home, patient preference for d/c planning needs, availability of treatment team to discuss questions or concerns patient and/or family may have regarding understanding medications and recognizing signs and symptoms once discharged  CM also encouraged patient to follow up with all recommended appointments after discharge  Patient advised of importance for patient and family to participate in managing patients medical well being

## 2021-04-26 NOTE — NURSING NOTE
AWAKE /61, AND HEART RATE 81  CARDIZEM 30 MG PO GIVEN  VOIDED ON BEDPAN 400 CC URINE  WT 59 6 KG  NO DISTRESS NOTED REMAINS IN AFIB   CALL BELL GIVEN

## 2021-04-26 NOTE — ASSESSMENT & PLAN NOTE
· Patient was weaned off of the Cardizem drip yesterday, however this morning at the time of my evaluation, the patient's heart rate was back up to between 140 and 180  · Cardizem drip has been reordered  · Await cardiology re-evaluation for further medication optimization  · Current oral regimen includes metoprolol tartrate 12 5 mg p o  B i d   And diltiazem 30 mg p o  Q 6 hours-will continue the same  · Continue apixaban 2 5 mg p o  B i d  For anticoagulation purposes  · Continue present management here in the step-down unit

## 2021-04-26 NOTE — NURSING NOTE
BP 88/56, HEART RATE 107  REMAINS AFIB ON MONITOR  PATIENT IS ASLEEP   RESP EASY AND 02 MAINTAINED 3 LITERS N/C WITH 02 SAT OF 99%  RUPA SAAVEDRA IS AWARE  NO NEW ORDERS  CALL BELL NEAR

## 2021-04-26 NOTE — NURSING NOTE
1950-ROUTINE RESP RX ADMINISTERED  VERY ANXIOUS NOW  ATARAX PO ADMINISTERED   ON BED PAN FOR FLATUS, AND BARRIER CREAM APPLIED AFTER REMOVAL TO RED ANUS

## 2021-04-26 NOTE — ASSESSMENT & PLAN NOTE
· Baseline requirements of supplemental oxygen at  home are 2-3 L via nasal cannula for end-stage COPD  · Patient remains on 3 L of supplemental oxygen here in the hospital

## 2021-04-26 NOTE — PLAN OF CARE
Problem: Potential for Falls  Goal: Patient will remain free of falls  Description: INTERVENTIONS:  - Assess patient frequently for physical needs  -  Identify cognitive and physical deficits and behaviors that affect risk of falls    -  [place] fall precautions as indicated by assessment   - Educate patient/family on patient safety including physical limitations  - Instruct patient to call for assistance with activity based on assessment  - Modify environment to reduce risk of injury  - Consider OT/PT consult to assist with strengthening/mobility  Outcome: Progressing     Problem: Prexisting or High Potential for Compromised Skin Integrity  Goal: Skin integrity is maintained or improved  Description: INTERVENTIONS:  - Identify patients at risk for skin breakdown  - Assess and monitor skin integrity  - Assess and monitor nutrition and hydration status  - Monitor labs   - Assess for incontinence   - Turn and reposition patient  - Assist with mobility/ambulation  - Relieve pressure over bony prominences  - Avoid friction and shearing  - Provide appropriate hygiene as needed including keeping skin clean and dry  - Evaluate need for skin moisturizer/barrier cream  - Collaborate with interdisciplinary team   - Patient/family teaching  - Consider wound care consult   Outcome: Progressing     Problem: CARDIOVASCULAR - ADULT  Goal: Maintains optimal cardiac output and hemodynamic stability  Description: INTERVENTIONS:  - Monitor I/O, vital signs and rhythm  - Monitor for S/S and trends of decreased cardiac output  - Administer and titrate ordered vasoactive medications to optimize hemodynamic stability  - Assess quality of pulses, skin color and temperature  - Assess for signs of decreased coronary artery perfusion  - Instruct patient to report change in severity of symptoms  Outcome: Progressing  Goal: Absence of cardiac dysrhythmias or at baseline rhythm  Description: INTERVENTIONS:  - Continuous cardiac monitoring, vital signs, obtain 12 lead EKG if ordered  - Administer antiarrhythmic and heart rate control medications as ordered  - Monitor electrolytes and administer replacement therapy as ordered  Outcome: Progressing     Problem: RESPIRATORY - ADULT  Goal: Achieves optimal ventilation and oxygenation  Description: INTERVENTIONS:  - Assess for changes in respiratory status  - Assess for changes in mentation and behavior  - Position to facilitate oxygenation and minimize respiratory effort  - Oxygen administered by appropriate delivery if ordered  - Initiate smoking cessation education as indicated  - Encourage broncho-pulmonary hygiene including cough, deep breathe, Incentive Spirometry  - Assess the need for suctioning and aspirate as needed  - Assess and instruct to report SOB or any respiratory difficulty  - Respiratory Therapy support as indicated  Outcome: Progressing     Problem: METABOLIC, FLUID AND ELECTROLYTES - ADULT  Goal: Electrolytes maintained within normal limits  Description: INTERVENTIONS:  - Monitor labs and assess patient for signs and symptoms of electrolyte imbalances  - Administer electrolyte replacement as ordered  - Monitor response to electrolyte replacements, including repeat lab results as appropriate  - Instruct patient on fluid and nutrition as appropriate  Outcome: Progressing  Goal: Fluid balance maintained  Description: INTERVENTIONS:  - Monitor labs   - Monitor I/O and WT  - Instruct patient on fluid and nutrition as appropriate  - Assess for signs & symptoms of volume excess or deficit  Outcome: Progressing  Goal: Glucose maintained within target range  Description: INTERVENTIONS:  - Monitor Blood Glucose as ordered  - Assess for signs and symptoms of hyperglycemia and hypoglycemia  - Administer ordered medications to maintain glucose within target range  - Assess nutritional intake and initiate nutrition service referral as needed  Outcome: Progressing     Problem: SKIN/TISSUE INTEGRITY - ADULT  Goal: Skin integrity remains intact  Description: INTERVENTIONS  - Identify patients at risk for skin breakdown  - Assess and monitor skin integrity  - Assess and monitor nutrition and hydration status  - Monitor labs (i e  albumin)  - Assess for incontinence   - Turn and reposition patient  - Assist with mobility/ambulation  - Relieve pressure over bony prominences  - Avoid friction and shearing  - Provide appropriate hygiene as needed including keeping skin clean and dry  - Evaluate need for skin moisturizer/barrier cream  - Collaborate with interdisciplinary team (i e  Nutrition, Rehabilitation, etc )   - Patient/family teaching  Outcome: Progressing  Goal: Incision(s), wounds(s) or drain site(s) healing without S/S of infection  Description: INTERVENTIONS  - Assess and document risk factors for skin impairment   - Assess and document dressing, incision, wound bed, drain sites and surrounding tissue  - Consider nutrition services referral as needed  - Oral mucous membranes remain intact  - Provide patient/ family education  Outcome: Progressing  Goal: Oral mucous membranes remain intact  Description: INTERVENTIONS  - Assess oral mucosa and hygiene practices  - Implement preventative oral hygiene regimen  - Implement oral medicated treatments as ordered  - Initiate Nutrition services referral as needed  Outcome: Progressing     Problem: PAIN - ADULT  Goal: Verbalizes/displays adequate comfort level or baseline comfort level  Description: Interventions:  - Encourage patient to monitor pain and request assistance  - Assess pain using appropriate pain scale  - Administer analgesics based on type and severity of pain and evaluate response  - Implement non-pharmacological measures as appropriate and evaluate response  - Consider cultural and social influences on pain and pain management  - Notify physician/advanced practitioner if interventions unsuccessful or patient reports new pain  Outcome: Progressing Problem: INFECTION - ADULT  Goal: Absence or prevention of progression during hospitalization  Description: INTERVENTIONS:  - Assess and monitor for signs and symptoms of infection  - Monitor lab/diagnostic results  - Monitor all insertion sites, i e  indwelling lines, tubes, and drains  - Monitor endotracheal if appropriate and nasal secretions for changes in amount and color  - [place] appropriate cooling/warming therapies per order  - Administer medications as ordered  - Instruct and encourage patient and family to use good hand hygiene technique  - Identify and instruct in appropriate isolation precautions for identified infection/condition  Outcome: Progressing  Goal: Absence of fever/infection during neutropenic period  Description: INTERVENTIONS:  - Monitor WBC    Outcome: Progressing     Problem: SAFETY ADULT  Goal: Patient will remain free of falls  Description: INTERVENTIONS:  - Assess patient frequently for physical needs  -  Identify cognitive and physical deficits and behaviors that affect risk of falls    -  [place] fall precautions as indicated by assessment   - Educate patient/family on patient safety including physical limitations  - Instruct patient to call for assistance with activity based on assessment  - Modify environment to reduce risk of injury  - Consider OT/PT consult to assist with strengthening/mobility  Outcome: Progressing  Goal: Maintain or return to baseline ADL function  Description: INTERVENTIONS:  -  Assess patient's ability to carry out ADLs; assess patient's baseline for ADL function and identify physical deficits which impact ability to perform ADLs (bathing, care of mouth/teeth, toileting, grooming, dressing, etc )  - Assess/evaluate cause of self-care deficits   - Assess range of motion  - Assess patient's mobility; develop plan if impaired  - Assess patient's need for assistive devices and provide as appropriate  - Encourage maximum independence but intervene and supervise when necessary  - Involve family in performance of ADLs  - Assess for home care needs following discharge   - Consider OT consult to assist with ADL evaluation and planning for discharge  - Provide patient education as appropriate  Outcome: Progressing  Goal: Maintain or return mobility status to optimal level  Description: INTERVENTIONS:  - Assess patient's baseline mobility status (ambulation, transfers, stairs, etc )    - Identify cognitive and physical deficits and behaviors that affect mobility  - Identify mobility aids required to assist with transfers and/or ambulation (gait belt, sit-to-stand, lift, walker, cane, etc )  - [place] fall precautions as indicated by assessment  - Record patient progress and toleration of activity level on Mobility SBAR; progress patient to next Phase/Stage  - Instruct patient to call for assistance with activity based on assessment  - Consider rehabilitation consult to assist with strengthening/weightbearing, etc   Outcome: Progressing     Problem: DISCHARGE PLANNING  Goal: Discharge to home or other facility with appropriate resources  Description: INTERVENTIONS:  - Identify barriers to discharge w/patient and caregiver  - Arrange for needed discharge resources and transportation as appropriate  - Identify discharge learning needs (meds, wound care, etc )  - Arrange for interpretive services to assist at discharge as needed  - Refer to Case Management Department for coordinating discharge planning if the patient needs post-hospital services based on physician/advanced practitioner order or complex needs related to functional status, cognitive ability, or social support system  Outcome: Progressing     Problem: Knowledge Deficit  Goal: Patient/family/caregiver demonstrates understanding of disease process, treatment plan, medications, and discharge instructions  Description: Complete learning assessment and assess knowledge base    Interventions:  - Provide teaching at level of understanding  - Provide teaching via preferred learning methods  Outcome: Progressing     Problem: Nutrition/Hydration-ADULT  Goal: Nutrient/Hydration intake appropriate for improving, restoring or maintaining nutritional needs  Description: Monitor and assess patient's nutrition/hydration status for malnutrition  Collaborate with interdisciplinary team and initiate plan and interventions as ordered  Monitor patient's weight and dietary intake as ordered or per policy  Utilize nutrition screening tool and intervene as necessary  Determine patient's food preferences and provide high-protein, high-caloric foods as appropriate  INTERVENTIONS:  - Monitor oral intake, urinary output, labs, and treatment plans  - Assess nutrition and hydration status and recommend course of action  - Evaluate amount of meals eaten  - Assist patient with eating if necessary   - Allow adequate time for meals  - Recommend/ encourage appropriate diets, oral nutritional supplements, and vitamin/mineral supplements  - Order, calculate, and assess calorie counts as needed  - Recommend, monitor, and adjust tube feedings and TPN/PPN based on assessed needs  - Assess need for intravenous fluids  - Provide specific nutrition/hydration education as appropriate  - Include patient/family/caregiver in decisions related to nutrition  Outcome: Progressing     Problem: Potential for Falls  Goal: Patient will remain free of falls  Description: INTERVENTIONS:  - Assess patient frequently for physical needs  -  Identify cognitive and physical deficits and behaviors that affect risk of falls    -  [place] fall precautions as indicated by assessment   - Educate patient/family on patient safety including physical limitations  - Instruct patient to call for assistance with activity based on assessment  - Modify environment to reduce risk of injury  - Consider OT/PT consult to assist with strengthening/mobility  Outcome: Progressing     Problem: Prexisting or High Potential for Compromised Skin Integrity  Goal: Skin integrity is maintained or improved  Description: INTERVENTIONS:  - Identify patients at risk for skin breakdown  - Assess and monitor skin integrity  - Assess and monitor nutrition and hydration status  - Monitor labs   - Assess for incontinence   - Turn and reposition patient  - Assist with mobility/ambulation  - Relieve pressure over bony prominences  - Avoid friction and shearing  - Provide appropriate hygiene as needed including keeping skin clean and dry  - Evaluate need for skin moisturizer/barrier cream  - Collaborate with interdisciplinary team   - Patient/family teaching  - Consider wound care consult   Outcome: Progressing     Problem: CARDIOVASCULAR - ADULT  Goal: Maintains optimal cardiac output and hemodynamic stability  Description: INTERVENTIONS:  - Monitor I/O, vital signs and rhythm  - Monitor for S/S and trends of decreased cardiac output  - Administer and titrate ordered vasoactive medications to optimize hemodynamic stability  - Assess quality of pulses, skin color and temperature  - Assess for signs of decreased coronary artery perfusion  - Instruct patient to report change in severity of symptoms  Outcome: Progressing  Goal: Absence of cardiac dysrhythmias or at baseline rhythm  Description: INTERVENTIONS:  - Continuous cardiac monitoring, vital signs, obtain 12 lead EKG if ordered  - Administer antiarrhythmic and heart rate control medications as ordered  - Monitor electrolytes and administer replacement therapy as ordered  Outcome: Progressing     Problem: RESPIRATORY - ADULT  Goal: Achieves optimal ventilation and oxygenation  Description: INTERVENTIONS:  - Assess for changes in respiratory status  - Assess for changes in mentation and behavior  - Position to facilitate oxygenation and minimize respiratory effort  - Oxygen administered by appropriate delivery if ordered  - Initiate smoking cessation education as indicated  - Encourage broncho-pulmonary hygiene including cough, deep breathe, Incentive Spirometry  - Assess the need for suctioning and aspirate as needed  - Assess and instruct to report SOB or any respiratory difficulty  - Respiratory Therapy support as indicated  Outcome: Progressing     Problem: METABOLIC, FLUID AND ELECTROLYTES - ADULT  Goal: Electrolytes maintained within normal limits  Description: INTERVENTIONS:  - Monitor labs and assess patient for signs and symptoms of electrolyte imbalances  - Administer electrolyte replacement as ordered  - Monitor response to electrolyte replacements, including repeat lab results as appropriate  - Instruct patient on fluid and nutrition as appropriate  Outcome: Progressing  Goal: Fluid balance maintained  Description: INTERVENTIONS:  - Monitor labs   - Monitor I/O and WT  - Instruct patient on fluid and nutrition as appropriate  - Assess for signs & symptoms of volume excess or deficit  Outcome: Progressing  Goal: Glucose maintained within target range  Description: INTERVENTIONS:  - Monitor Blood Glucose as ordered  - Assess for signs and symptoms of hyperglycemia and hypoglycemia  - Administer ordered medications to maintain glucose within target range  - Assess nutritional intake and initiate nutrition service referral as needed  Outcome: Progressing     Problem: SKIN/TISSUE INTEGRITY - ADULT  Goal: Skin integrity remains intact  Description: INTERVENTIONS  - Identify patients at risk for skin breakdown  - Assess and monitor skin integrity  - Assess and monitor nutrition and hydration status  - Monitor labs (i e  albumin)  - Assess for incontinence   - Turn and reposition patient  - Assist with mobility/ambulation  - Relieve pressure over bony prominences  - Avoid friction and shearing  - Provide appropriate hygiene as needed including keeping skin clean and dry  - Evaluate need for skin moisturizer/barrier cream  - Collaborate with interdisciplinary team (i e  Nutrition, Rehabilitation, etc )   - Patient/family teaching  Outcome: Progressing  Goal: Incision(s), wounds(s) or drain site(s) healing without S/S of infection  Description: INTERVENTIONS  - Assess and document risk factors for skin impairment   - Assess and document dressing, incision, wound bed, drain sites and surrounding tissue  - Consider nutrition services referral as needed  - Oral mucous membranes remain intact  - Provide patient/ family education  Outcome: Progressing  Goal: Oral mucous membranes remain intact  Description: INTERVENTIONS  - Assess oral mucosa and hygiene practices  - Implement preventative oral hygiene regimen  - Implement oral medicated treatments as ordered  - Initiate Nutrition services referral as needed  Outcome: Progressing     Problem: PAIN - ADULT  Goal: Verbalizes/displays adequate comfort level or baseline comfort level  Description: Interventions:  - Encourage patient to monitor pain and request assistance  - Assess pain using appropriate pain scale  - Administer analgesics based on type and severity of pain and evaluate response  - Implement non-pharmacological measures as appropriate and evaluate response  - Consider cultural and social influences on pain and pain management  - Notify physician/advanced practitioner if interventions unsuccessful or patient reports new pain  Outcome: Progressing     Problem: INFECTION - ADULT  Goal: Absence or prevention of progression during hospitalization  Description: INTERVENTIONS:  - Assess and monitor for signs and symptoms of infection  - Monitor lab/diagnostic results  - Monitor all insertion sites, i e  indwelling lines, tubes, and drains  - Monitor endotracheal if appropriate and nasal secretions for changes in amount and color  - [place] appropriate cooling/warming therapies per order  - Administer medications as ordered  - Instruct and encourage patient and family to use good hand hygiene technique  - Identify and instruct in appropriate isolation precautions for identified infection/condition  Outcome: Progressing  Goal: Absence of fever/infection during neutropenic period  Description: INTERVENTIONS:  - Monitor WBC    Outcome: Progressing     Problem: SAFETY ADULT  Goal: Patient will remain free of falls  Description: INTERVENTIONS:  - Assess patient frequently for physical needs  -  Identify cognitive and physical deficits and behaviors that affect risk of falls    -  [place] fall precautions as indicated by assessment   - Educate patient/family on patient safety including physical limitations  - Instruct patient to call for assistance with activity based on assessment  - Modify environment to reduce risk of injury  - Consider OT/PT consult to assist with strengthening/mobility  Outcome: Progressing  Goal: Maintain or return to baseline ADL function  Description: INTERVENTIONS:  -  Assess patient's ability to carry out ADLs; assess patient's baseline for ADL function and identify physical deficits which impact ability to perform ADLs (bathing, care of mouth/teeth, toileting, grooming, dressing, etc )  - Assess/evaluate cause of self-care deficits   - Assess range of motion  - Assess patient's mobility; develop plan if impaired  - Assess patient's need for assistive devices and provide as appropriate  - Encourage maximum independence but intervene and supervise when necessary  - Involve family in performance of ADLs  - Assess for home care needs following discharge   - Consider OT consult to assist with ADL evaluation and planning for discharge  - Provide patient education as appropriate  Outcome: Progressing  Goal: Maintain or return mobility status to optimal level  Description: INTERVENTIONS:  - Assess patient's baseline mobility status (ambulation, transfers, stairs, etc )    - Identify cognitive and physical deficits and behaviors that affect mobility  - Identify mobility aids required to assist with transfers and/or ambulation (gait belt, sit-to-stand, lift, walker, cane, etc )  - [place] fall precautions as indicated by assessment  - Record patient progress and toleration of activity level on Mobility SBAR; progress patient to next Phase/Stage  - Instruct patient to call for assistance with activity based on assessment  - Consider rehabilitation consult to assist with strengthening/weightbearing, etc   Outcome: Progressing     Problem: DISCHARGE PLANNING  Goal: Discharge to home or other facility with appropriate resources  Description: INTERVENTIONS:  - Identify barriers to discharge w/patient and caregiver  - Arrange for needed discharge resources and transportation as appropriate  - Identify discharge learning needs (meds, wound care, etc )  - Arrange for interpretive services to assist at discharge as needed  - Refer to Case Management Department for coordinating discharge planning if the patient needs post-hospital services based on physician/advanced practitioner order or complex needs related to functional status, cognitive ability, or social support system  Outcome: Progressing     Problem: Knowledge Deficit  Goal: Patient/family/caregiver demonstrates understanding of disease process, treatment plan, medications, and discharge instructions  Description: Complete learning assessment and assess knowledge base  Interventions:  - Provide teaching at level of understanding  - Provide teaching via preferred learning methods  Outcome: Progressing     Problem: Nutrition/Hydration-ADULT  Goal: Nutrient/Hydration intake appropriate for improving, restoring or maintaining nutritional needs  Description: Monitor and assess patient's nutrition/hydration status for malnutrition  Collaborate with interdisciplinary team and initiate plan and interventions as ordered  Monitor patient's weight and dietary intake as ordered or per policy  Utilize nutrition screening tool and intervene as necessary   Determine patient's food preferences and provide high-protein, high-caloric foods as appropriate       INTERVENTIONS:  - Monitor oral intake, urinary output, labs, and treatment plans  - Assess nutrition and hydration status and recommend course of action  - Evaluate amount of meals eaten  - Assist patient with eating if necessary   - Allow adequate time for meals  - Recommend/ encourage appropriate diets, oral nutritional supplements, and vitamin/mineral supplements  - Order, calculate, and assess calorie counts as needed  - Recommend, monitor, and adjust tube feedings and TPN/PPN based on assessed needs  - Assess need for intravenous fluids  - Provide specific nutrition/hydration education as appropriate  - Include patient/family/caregiver in decisions related to nutrition  Outcome: Progressing

## 2021-04-26 NOTE — ASSESSMENT & PLAN NOTE
· Without acute exacerbation  · Continue with inhalers which include Spiriva and Symbicort  · Continue theophylline and Singulair  · Continue patient's chronic steroid therapy-prednisone 5 mg p o  B i d   · Continue concomitant Fosamax, calcium carbonate and vitamin D therapy  · Continue respiratory protocol-continue p r n   Levalbuterol

## 2021-04-26 NOTE — PROGRESS NOTES
[address]    Cardiology Progress Note  David Davis 82 y o  female   YOB: 1935 MRN: [id number]  Unit/Bed#: ICU 06 Encounter: [id number]      Subjective:   Heart rate in atrial fibrillation continues to be rapid  NO chest pain or acute shortness of breath  Assessments  Principal Problem:    Atrial fibrillation with rapid ventricular response (HCC)  Active Problems:    Other emphysema (HCC)    Acute on chronic diastolic congestive heart failure (HCC)    Closed wedge compression fracture of T12 vertebra ([facility])    Chronic respiratory failure with hypoxia ([facility])    Type 2 diabetes mellitus without complication, without long-term current use of insulin (HCC)    Iron deficiency anemia    Plan  Paroxysmal Atrial Fibrillation with RVR  · Was started on p o  Cardizem 30 Q 6 H yesterday, and Cardizem drip was weaned off  Cardizem was supposed to be increased back to 60 Q 6, but appears to been continued at 30 q 6 until this morning  · As a result, heart rate has trended back up into the 140s, and she was re-initiated on Cardizem drip  · Increased Cardizem to 60 Q 6, but HR persisted into the 150-170s, and BP is on lower side  · Start IV amiodarone bolus+infusion  · Continue eliquis for anticoagulation    Acute on chronic diastolic heart failure  · Initial BNP elevated at 476  · Received 2 doses of IV Lasix 40 with modest response  · I&O inaccurate  · Creatinine stable at 0 6  · SpO2 96-99% on 3 L nasal cannula oxygen  · Blood pressure borderline overnight  · Appears euvolemic  · Switch back to home oral diuresis      Review of Systems   All other systems reviewed and are negative  Telemetry Review: atrial fibrillation with RVR  HR has been elevated in 120-170s since yesterday evening  Objective:   Vitals: Blood pressure 113/56, pulse (!) 178, temperature 98 3 °F (36 8 °C), temperature source Temporal, resp   rate (!) 26, height 5' (1 524 m), weight 59 6 kg (131 lb 6 3 oz), SpO2 96 %, not currently breastfeeding , Body mass index is 25 66 kg/m² ,   Orthostatic Blood Pressures      Most Recent Value   Blood Pressure  113/56 filed at 04/26/2021 0830   Patient Position - Orthostatic VS  Lying filed at 04/26/2021 4396         Systolic (02NFN), HNO:109 , Min:88 , EMN:607     Diastolic (72WAK), RIP:95, Min:51, Max:81    Wt Readings from Last 5 Encounters:   04/26/21 59 6 kg (131 lb 6 3 oz)   04/12/21 60 5 kg (133 lb 4 3 oz)   04/09/21 60 8 kg (134 lb)   04/07/21 61 kg (134 lb 7 7 oz)   03/02/21 64 3 kg (141 lb 12 8 oz)     I/O       04/24 0701 - 04/25 0700 04/25 0701 - 04/26 0700 04/26 0701 - 04/27 0700    P  O   160 180    I V  (mL/kg)  74 3 (1 2)     IV Piggyback  100     Total Intake(mL/kg)  334 3 (5 6) 180 (3)    Urine (mL/kg/hr)  100 (0 1)     Stool  0     Total Output  100     Net  +234 3 +180           Unmeasured Urine Occurrence  3 x     Unmeasured Stool Occurrence  1 x               Physical Exam  Vitals signs and nursing note reviewed  Constitutional:       General: She is not in acute distress  Appearance: Normal appearance  She is well-developed  She is not ill-appearing  HENT:      Head: Normocephalic and atraumatic  Nose: No congestion  Eyes:      General: No scleral icterus  Conjunctiva/sclera: Conjunctivae normal    Neck:      Musculoskeletal: Neck supple  Vascular: No carotid bruit or JVD  Cardiovascular:      Rate and Rhythm: Tachycardia present  Rhythm irregular  Pulses: Normal pulses  Heart sounds: Normal heart sounds  No murmur  No friction rub  No gallop  Pulmonary:      Effort: Pulmonary effort is normal  No respiratory distress  Breath sounds: Normal breath sounds  No rales  Abdominal:      General: There is no distension  Palpations: Abdomen is soft  Tenderness: There is no abdominal tenderness  Musculoskeletal:         General: No swelling or tenderness  Right lower leg: No edema        Left lower leg: No edema  Skin:     General: Skin is warm  Neurological:      General: No focal deficit present  Mental Status: She is alert and oriented to person, place, and time  Mental status is at baseline  Psychiatric:         Mood and Affect: Mood normal          Behavior: Behavior normal          Thought Content:  Thought content normal            Laboratory Results: personally reviewed  Results from last 7 days   Lab Units 04/25/21  1322 04/25/21  1030 04/25/21  0702   TROPONIN I ng/mL 0 03 0 03 0 03       CBC with diff:   Results from last 7 days   Lab Units 04/26/21  0526 04/25/21  0702   WBC Thousand/uL 8 40 8 30   HEMOGLOBIN g/dL 10 1* 10 6*   HEMATOCRIT % 32 3* 34 1*   MCV fL 73* 72*   PLATELETS Thousands/uL 403* 376   MCH pg 22 7* 22 5*   MCHC g/dL 31 2 31 3   RDW % 19 5* 19 9*   MPV fL 8 0* 7 3*         CMP:  Results from last 7 days   Lab Units 04/26/21  0526 04/25/21  0702   POTASSIUM mmol/L 3 9 3 0*   CHLORIDE mmol/L 101 97*   CO2 mmol/L 28 33*   BUN mg/dL 8 7   CREATININE mg/dL 0 62 0 68   CALCIUM mg/dL 8 1* 8 5*   EGFR ml/min/1 73sq m 84 81         BMP:  Results from last 7 days   Lab Units 04/26/21  0526 04/25/21  0702   POTASSIUM mmol/L 3 9 3 0*   CHLORIDE mmol/L 101 97*   CO2 mmol/L 28 33*   BUN mg/dL 8 7   CREATININE mg/dL 0 62 0 68   CALCIUM mg/dL 8 1* 8 5*       BNP:   Recent Labs     04/25/21  0702   *       Magnesium:       Coags:   Results from last 7 days   Lab Units 04/25/21  0702   INR  1 10       TSH:        Hemoglobin A1C       Lipid Profile:       Meds/Allergies   all current active meds have been reviewed and current meds:   Current Facility-Administered Medications   Medication Dose Route Frequency    acetaminophen (TYLENOL) tablet 650 mg  650 mg Oral Q4H PRN    albuterol inhalation solution 2 5 mg  2 5 mg Nebulization Q4H PRN    [START ON 5/1/2021] alendronate (FOSAMAX) tablet 70 mg  70 mg Oral Weekly Before Breakfast    apixaban (ELIQUIS) tablet 2 5 mg  2 5 mg Oral BID    budesonide-formoterol (SYMBICORT) 160-4 5 mcg/act inhaler 2 puff  2 puff Inhalation BID    calcium carbonate-vitamin D (OSCAL-D) 500 mg-200 units per tablet 1 tablet  1 tablet Oral Daily With Breakfast    diltiazem (CARDIZEM) 125 mg in sodium chloride 0 9 % 125 mL infusion  1-15 mg/hr Intravenous Titrated    diltiazem (CARDIZEM) tablet 30 mg  30 mg Oral Q6H CONG    gabapentin (NEURONTIN) capsule 300 mg  300 mg Oral TID    glycerin-hypromellose- (ARTIFICIAL TEARS) ophthalmic solution 2 drop  2 drop Both Eyes Q3H PRN    HYDROcodone-acetaminophen (NORCO) 5-325 mg per tablet 1 tablet  1 tablet Oral Q6H PRN    hydrOXYzine HCL (ATARAX) tablet 25 mg  25 mg Oral Q6H PRN    insulin glargine (LANTUS) subcutaneous injection 10 Units 0 1 mL  10 Units Subcutaneous HS    insulin lispro (HumaLOG) 100 units/mL subcutaneous injection 1-5 Units  1-5 Units Subcutaneous TID AC    insulin lispro (HumaLOG) 100 units/mL subcutaneous injection 1-5 Units  1-5 Units Subcutaneous HS    iron polysaccharides (FERREX) capsule 150 mg  150 mg Oral Daily    levalbuterol (XOPENEX) inhalation solution 1 25 mg  1 25 mg Nebulization TID    And    sodium chloride 0 9 % inhalation solution 3 mL  3 mL Nebulization TID    lidocaine (LIDODERM) 5 % patch 1 patch  1 patch Topical Daily    magnesium oxide (MAG-OX) tablet 400 mg  400 mg Oral BID    menthol-methyl salicylate (BENGAY) 82-43 % cream   Apply externally 4x Daily PRN    methocarbamol (ROBAXIN) tablet 500 mg  500 mg Oral Q8H [address]    metoprolol tartrate (LOPRESSOR) partial tablet 12 5 mg  12 5 mg Oral Q12H    montelukast (SINGULAIR) tablet 10 mg  10 mg Oral HS    pantoprazole (PROTONIX) EC tablet 40 mg  40 mg Oral BID    polyethylene glycol (MIRALAX) packet 17 g  17 g Oral Daily    potassium chloride (K-DUR,KLOR-CON) CR tablet 40 mEq  40 mEq Oral Daily    predniSONE tablet 5 mg  5 mg Oral BID With Meals    simethicone (MYLICON) chewable tablet 80 mg  80 mg Oral Q6H PRN    theophylline (CORTNEY-24) 24 hr capsule 200 mg  200 mg Oral Daily    tiotropium (SPIRIVA) capsule for inhaler 18 mcg  18 mcg Inhalation Daily     Medications Prior to Admission   Medication    acetaminophen (TYLENOL) 325 mg tablet    alendronate (FOSAMAX) 70 mg tablet    apixaban (ELIQUIS) 5 mg    Artificial Tear Solution (GENTEAL TEARS) 0 1-0 2-0 3 % SOLN    budesonide-formoterol (Symbicort) 160-4 5 mcg/act inhaler    Calcium Carb-Cholecalciferol (CALCIUM 1000 + D PO)    cholecalciferol (VITAMIN D3) 400 units tablet    fluticasone-umeclidinium-vilanterol (Trelegy Ellipta) 100-62 5-25 MCG/INH inhaler    furosemide (LASIX) 20 mg tablet    gabapentin (NEURONTIN) 300 mg capsule    HYDROcodone-acetaminophen (NORCO) 5-325 mg per tablet    hydrOXYzine HCL (ATARAX) 25 mg tablet    ipratropium-albuterol (DUO-NEB) 0 5-2 5 mg/3 mL nebulizer solution    iron polysaccharides (Ferrex 150) 150 mg capsule    lidocaine (LIDODERM) 5 %    magnesium oxide (MAG-OX) 400 mg    menthol-methyl salicylate (BENGAY) 95-55 % cream    metFORMIN (GLUCOPHAGE) 500 mg tablet    methocarbamol (ROBAXIN) 500 mg tablet    metoprolol tartrate (LOPRESSOR) 25 mg tablet    montelukast (SINGULAIR) 10 mg tablet    OXYGEN-HELIUM IN    pantoprazole (PROTONIX) 40 mg tablet    potassium chloride (K-DUR,KLOR-CON) 20 mEq tablet    predniSONE 5 mg tablet    psyllium (METAMUCIL) packet    simethicone (MYLICON) 80 mg chewable tablet    sitaGLIPtin (JANUVIA) 50 mg tablet    theophylline (CORTNEY-24) 200 MG 24 hr capsule    polyethylene glycol (MIRALAX) 17 g packet     diltiazem, 1-15 mg/hr, Last Rate: 7 5 mg/hr (04/26/21 0901)          Cardiac testing: reviewed  Results for orders placed during the hospital encounter of 02/22/21   Echo complete with contrast if indicated    [address] [address]    Transthoracic Echocardiogram  2D, M-mode, Doppler, and Color Doppler    Study date:  2021    Patient: Sandra Hsieh  MR number: [id number]  Account number: [de-identified]  : 1935  Age: 82 years  Gender: Female  Status: Outpatient  Location: [facility] [place]  Height: 60 in  Weight: 144 lb  BP: 116/ 78 mmHg    Indications: CHF  Diagnoses: I50 9 - Heart failure, unspecified    Sonographer:  Sander Colmenares RDCS  Referring Physician:  LEEROY Lagos  Group:  [facility]  Interpreting Physician:  Homer Manley MD    SUMMARY    LEFT VENTRICLE:  Systolic function was normal  Ejection fraction was estimated to be 60 %  There were no regional wall motion abnormalities  There was mild concentric hypertrophy  Doppler parameters were consistent with abnormal left ventricular relaxation (grade 1 diastolic dysfunction)  LEFT ATRIUM:  The atrium was dilated  MITRAL VALVE:  There was moderate annular calcification  AORTIC VALVE:  The valve was trileaflet  Leaflets exhibited moderate calcification and normal cuspal separation  There was mild to moderate regurgitation  AORTA:  There was mild dilatation of the ascending aorta to 3 9 cm  HISTORY: Dyspnea  PRIOR HISTORY: Congestive heart failure  Atrial fibrillation  Chronic lung disease  PROCEDURE: The study was performed in the [facility]  This was a routine study  The transthoracic approach was used  The study included complete 2D imaging, M-mode, complete spectral Doppler, and color Doppler  The  heart rate was 117 bpm, at the start of the study  Images were obtained from the parasternal, apical, subcostal, and suprasternal notch acoustic windows  Echocardiographic views were limited due to restricted patient mobility, poor  acoustic window availability, and lung interference  This was a technically difficult study      LEFT VENTRICLE: Size was normal  Systolic function was normal  Ejection fraction was estimated to be 60 %  There were no regional wall motion abnormalities  Wall thickness was normal  There was mild concentric hypertrophy  No evidence of  apical thrombus  DOPPLER: Doppler parameters were consistent with abnormal left ventricular relaxation (grade 1 diastolic dysfunction)  RIGHT VENTRICLE: The size was normal  Systolic function was normal  Wall thickness was normal     LEFT ATRIUM: The atrium was dilated  RIGHT ATRIUM: Size was normal     MITRAL VALVE: There was moderate annular calcification  Valve structure was normal  There was normal leaflet separation  DOPPLER: The transmitral velocity was within the normal range  There was no evidence for stenosis  There was no  significant regurgitation  AORTIC VALVE: The valve was trileaflet  Leaflets exhibited moderate calcification and normal cuspal separation  DOPPLER: Transaortic velocity was within the normal range  There was no evidence for stenosis  There was mild to moderate  regurgitation  TRICUSPID VALVE: The valve structure was normal  There was normal leaflet separation  DOPPLER: The transtricuspid velocity was within the normal range  There was no evidence for stenosis  There was no significant regurgitation  PULMONIC VALVE: Leaflets exhibited normal thickness, no calcification, and normal cuspal separation  DOPPLER: The transpulmonic velocity was within the normal range  There was no significant regurgitation  PERICARDIUM: There was no pericardial effusion  The pericardium was normal in appearance  AORTA: The root exhibited normal size  There was mild dilatation of the ascending aorta to 3 9 cm  SYSTEMIC VEINS: IVC: The inferior vena cava was normal in size      SYSTEM MEASUREMENT TABLES    2D  %FS: 27 91 %  Ao Diam: 3 4 cm  Ao asc: 3 89 cm  EDV(Teich): 83 81 ml  EF(Teich): 54 34 %  ESV(Teich): 38 27 ml  IVSd: 1 19 cm  LA Area: 15 85 cm2  LA Diam: 2 91 cm  LVEDV MOD A4C: 81 33 ml  LVEF MOD A4C: 50 07 %  LVESV MOD A4C: 40 61 ml  LVIDd: 4 32 cm  LVIDs: 3 11 cm  LVLd A4C: 6 23 cm  LVLs A4C: 5 45 cm  LVOT Diam: 2 cm  LVPWd: 1 06 cm  RA Area: 12 39 cm2  RVIDd: 2 31 cm  SV MOD A4C: 40 72 ml  SV(Teich): 45 54 ml    CW  AR Dec [place]: 1 18 m/s2  AR Dec Time: 3103 32 ms  AR PHT: 899 96 ms  AR Vmax: 3 67 m/s  AR maxP 86 mmHg  AV Env  Ti: 230 89 ms  AV VTI: 20 79 cm  AV Vmax: 1 24 m/s  AV Vmean: 0 9 m/s  AV maxP 19 mmHg  AV meanPG: 3 51 mmHg  MV PHT: 64 68 ms  MV VTI: 38 89 cm  MV Vmax: 1 31 m/s  MV Vmean: 0 84 m/s  MV maxP 9 mmHg  MV meanPG: 3 11 mmHg  MVA By PHT: 3 4 cm2  TR Vmax: 2 78 m/s  TR maxP 87 mmHg    MM  TAPSE: 1 24 cm    PW  BONY (VTI): 2 5 cm2  BONY Vmax: 2 16 cm2  LVOT Env  Ti: 304 47 ms  LVOT VTI: 16 63 cm  LVOT Vmax: 0 86 m/s  LVOT Vmean: 0 55 m/s  LVOT maxP 95 mmHg  LVOT meanP 36 mmHg  LVSV Dopp: 51 96 ml  MVA (VTI): 1 34 cm2    [employer] Accredited Echocardiography [facility]    Prepared and electronically signed by    Rodney Yuan MD  Signed [id number] 16:26:39       No results found for this or any previous visit  No results found for this or any previous visit  No results found for this or any previous visit

## 2021-04-26 NOTE — NURSING NOTE
1240   RUPA SAAVEDRA AWARE OF BP 92/67 AND HEART RATE 115-130   CARDIZEM 30 MG IS ORDERED PO AND PARAMETERS REVIEWED WITH HOSPITALIST RUPA HYMAN  ORDERS RECEIVED TO GIVE 30 MG PO OF CARDIZEM FOR RATE CONTROL AND WILL CONTINUE TO MONITOR BP

## 2021-04-26 NOTE — ASSESSMENT & PLAN NOTE
Wt Readings from Last 3 Encounters:   04/26/21 59 6 kg (131 lb 6 3 oz)   04/12/21 60 5 kg (133 lb 4 3 oz)   04/09/21 60 8 kg (134 lb)     · Suspected mild exacerbation due to elevated BNP And dyspnea likely due to rapid atrial fibrillation  · Last echocardiogram was obtained on 02/22/2021 which shows LVEF of 60%  Grade 1 diastolic dysfunction    · Cardiology input appreciated   · Status post treatment with 2 doses of IV Lasix - the 2nd of which was given this morning  · Will resume home Lasix therapy starting tomorrow

## 2021-04-26 NOTE — PROGRESS NOTES
[facility]  Progress Note - Liborio Velazquez 1935, 82 y o  female MRN: [id number]  Unit/Bed#: ICU 06 Encounter: [id number]  Primary Care Provider: LEEROY Barnhart   Date and time admitted to hospital: 4/25/2021  6:51 AM    * Atrial fibrillation with rapid ventricular response ([address] )  Assessment & Plan  · Patient was weaned off of the Cardizem drip yesterday, however this morning at the time of my evaluation, the patient's heart rate was back up to between 140 and 180  · Cardizem drip has been reordered  · Await cardiology re-evaluation for further medication optimization  · Current oral regimen includes metoprolol tartrate 12 5 mg p o  B i d  And diltiazem 30 mg p o  Q 6 hours-will continue the same  · Continue apixaban 2 5 mg p o  B i d  For anticoagulation purposes  · Continue present management here in the step-down unit    Acute on chronic diastolic congestive heart failure (HCC)  Assessment & Plan  Wt Readings from Last 3 Encounters:   04/26/21 59 6 kg (131 lb 6 3 oz)   04/12/21 60 5 kg (133 lb 4 3 oz)   04/09/21 60 8 kg (134 lb)     · Suspected mild exacerbation due to elevated BNP And dyspnea likely due to rapid atrial fibrillation  · Last echocardiogram was obtained on 02/22/2021 which shows LVEF of 60%  Grade 1 diastolic dysfunction  · Cardiology input appreciated   · Status post treatment with 2 doses of IV Lasix - the 2nd of which was given this morning  · Will resume home Lasix therapy starting tomorrow        Other emphysema (HCC)  Assessment & Plan  · Without acute exacerbation  · Continue with inhalers which include Spiriva and Symbicort  · Continue theophylline and Singulair  · Continue patient's chronic steroid therapy-prednisone 5 mg p o  B i d   · Continue concomitant Fosamax, calcium carbonate and vitamin D therapy  · Continue respiratory protocol-continue p r n   Levalbuterol    Chronic respiratory failure with hypoxia (HCC)  Assessment & Plan  · Baseline requirements of supplemental oxygen at  home are 2-3 L via nasal cannula for end-stage COPD  · Patient remains on 3 L of supplemental oxygen here in the hospital    Closed wedge compression fracture of T12 vertebra ([address] )  Assessment & Plan  · Continue with physical therapy/ occupational therapy   · Pain management with the lidocaine patch    Type 2 diabetes mellitus without complication, without long-term current use of insulin [facility])  Assessment & Plan  Lab Results   Component Value Date    HGBA1C 12 6 (A) 04/09/2021       Recent Labs     04/25/21  1332 04/25/21  1653 04/25/21  2058 04/26/21  0719   POCGLU 207* 197* 158* 139       Blood Sugar Average: Last 72 hrs:  · (P) 175 25   · Oral hypoglycemics remain on hold  · Will add Lantus for basal coverage while here in the hospital  · Continue Accu-Cheks AC and HS with sliding scale coverage  · Continue gabapentin for diabetic neuropathy    Iron deficiency anemia  Assessment & Plan  · Mild drop in H&H noted-patient otherwise remains stable  · Continue iron supplementation        VTE Prophylaxis:  Apixaban (Eliquis)    Patient Centered Rounds: I have performed bedside rounds with nursing staff today      Discussions with Specialists or Other Care Team Provider:  Cardiology, case management, nursing, pharmacy  Education and Discussions with Family / Patient:  Patient was brought up to par, attempts to call the patient's grandchild Kj Alcantar at phone number [phone] at 8:38 a m -no answer    Current Length of Stay: 1 day(s)    Current Patient Status: Inpatient   Certification Statement: The patient will continue to require additional inpatient hospital stay due to The need for an IV Cardizem drip    Discharge Plan:  Discharge planning will commence once the patient has been deemed medically stable by Cardiology    Code Status: Level 1 - Full Code    Subjective:   Patient seen and examined, resting in bed, is frustrated that she is back in the hospital, has no new complaints this morning    Objective:     Vitals:   Temp (24hrs), Av 2 °F (36 8 °C), Min:97 °F (36 1 °C), Max:99 °F (37 2 °C)    Temp:  [97 °F (36 1 °C)-99 °F (37 2 °C)] 98 3 °F (36 8 °C)  HR:  [] 107  Resp:  [16-48] 28  BP: ()/(51-81) 94/66  SpO2:  [94 %-99 %] 97 %  Body mass index is 25 66 kg/m²  Input and Output Summary (last 24 hours): Intake/Output Summary (Last 24 hours) at 2021 0837  Last data filed at 2021 1943  Gross per 24 hour   Intake 334 33 ml   Output 100 ml   Net 234 33 ml       Physical Exam:   Physical Exam  Constitutional:       General: She is not in acute distress  Appearance: Normal appearance  She is normal weight  She is not ill-appearing  HENT:      Head: Normocephalic and atraumatic  Comments: Hard of hearing     Nose: Nose normal       Mouth/Throat:      Mouth: Mucous membranes are moist    Eyes:      Extraocular Movements: Extraocular movements intact  Pupils: Pupils are equal, round, and reactive to light  Neck:      Musculoskeletal: Normal range of motion and neck supple  No neck rigidity  Cardiovascular:      Pulses: Normal pulses  Heart sounds: Normal heart sounds  No murmur  No friction rub  No gallop  Comments: S1 plus S2, irregularly irregular, tachycardic  Pulmonary:      Effort: Pulmonary effort is normal  No respiratory distress  Breath sounds: Normal breath sounds  No wheezing, rhonchi or rales  Abdominal:      General: There is no distension  Palpations: Abdomen is soft  There is no mass  Tenderness: There is no abdominal tenderness  Hernia: No hernia is present  Musculoskeletal: Normal range of motion  General: No swelling or tenderness  Right lower leg: No edema  Left lower leg: No edema  Skin:     General: Skin is warm  Capillary Refill: Capillary refill takes less than 2 seconds  Findings: No erythema or rash     Neurological:      General: No focal deficit present  Mental Status: She is alert and oriented to person, place, and time  Mental status is at baseline  Cranial Nerves: No cranial nerve deficit  Motor: No weakness  Psychiatric:         Mood and Affect: Mood normal          Behavior: Behavior normal          Additional Data:     Labs:    Results from last 7 days   Lab Units 04/26/21  0526   WBC Thousand/uL 8 40   HEMOGLOBIN g/dL 10 1*   HEMATOCRIT % 32 3*   PLATELETS Thousands/uL 403*   NEUTROS PCT % 79*   LYMPHS PCT % 14*   MONOS PCT % 7   EOS PCT % 0     Results from last 7 days   Lab Units 04/26/21  0526   SODIUM mmol/L 139   POTASSIUM mmol/L 3 9   CHLORIDE mmol/L 101   CO2 mmol/L 28   BUN mg/dL 8   CREATININE mg/dL 0 62   CALCIUM mg/dL 8 1*     Results from last 7 days   Lab Units 04/25/21  0702   INR  1 10     Results from last 7 days   Lab Units 04/26/21  0719 04/25/21  2058 04/25/21  1653 04/25/21  1332 04/25/21  0444 04/24/21  1715 04/24/21  0427 04/23/21  0422 04/22/21  1611 04/22/21  0415 04/21/21  1637 04/21/21  0502   POC GLUCOSE mg/dl 139 158* 197* 207* 98 151* 102 99 115 104 122 113           * I Have Reviewed All Lab Data Listed Above  * Additional Pertinent Lab Tests Reviewed:  [facility] Admission  Reviewed    Imaging:  Imaging Reports Reviewed Today Include:  None    Recent Cultures (last 7 days):           Last 24 Hours Medication List:   Current Facility-Administered Medications   Medication Dose Route Frequency Provider Last Rate    acetaminophen  650 mg Oral Q4H PRN Danica Wendy, MARILEENP      albuterol  2 5 mg Nebulization Q4H PRN Danica WendyMARILEENP      [START ON 5/1/2021] alendronate  70 mg Oral Weekly Before Breakfast Danica Wendy, CRNP      apixaban  2 5 mg Oral BID Danica Wendy, LEEROY      budesonide-formoterol  2 puff Inhalation BID Danica Wendy, MARILEENP      calcium carbonate-vitamin D  1 tablet Oral Daily With Breakfast Danica WendyLEEROY      diltiazem  1-15 mg/hr Intravenous Titrated Elizabeth Olvera MD      diltiazem  30 mg Oral [facility] Mamie Wendy, CRNP      gabapentin  300 mg Oral TID Mamie Wendy, CRNP      glycerin-hypromellose-  2 drop Both Eyes Q3H PRN Mamie Wendy, CRNP      HYDROcodone-acetaminophen  1 tablet Oral Q6H PRN Mamie Wendy, CRNP      hydrOXYzine HCL  25 mg Oral Q6H PRN Mamie Wendy, CRNP      insulin lispro  1-5 Units Subcutaneous TID AC Mamie Wendy, CRNP      insulin lispro  1-5 Units Subcutaneous HS Mamie Wendy, CRNP      iron polysaccharides  150 mg Oral Daily Mamie Wendy, CRNP      levalbuterol  1 25 mg Nebulization TID Mamie Wendy, CRNP      And    sodium chloride  3 mL Nebulization TID Mamie Wendy, CRNP      lidocaine  1 patch Topical Daily Mamie Wendy, CRNP      magnesium oxide  400 mg Oral BID Mamie Wendy, CRNP      menthol-methyl salicylate   Apply externally 4x Daily PRN Mamie Wendy, CRNP      methocarbamol  500 mg Oral [facility] Mamie Wendy, CRNP      metoprolol tartrate  12 5 mg Oral Q12H Mamie Wendy, CRNP      montelukast  10 mg Oral HS Mamie Wendy, CRNP      pantoprazole  40 mg Oral BID Mamie Wendy, CRNP      polyethylene glycol  17 g Oral Daily Mamie Wendy, CRNP      potassium chloride  40 mEq Oral Daily Mamie Wendy, CRNP      predniSONE  5 mg Oral BID With Meals Mamie Wendy, CRNP      simethicone  80 mg Oral Q6H PRN Mamie Wendy, CRNP      theophylline  200 mg Oral Daily Mamie Wendy, CRNP      tiotropium  18 mcg Inhalation Daily [facility], CR[facility]          Today, Patient Was Seen By: Elizabeth Olvera MD    ** Please Note: Dictation voice to text software may have been used in the creation of this document   **

## 2021-04-26 NOTE — RESPIRATORY THERAPY NOTE
RT Protocol Note  Nneka Whitehead 82 y o  female MRN: [id number]  Unit/Bed#: ICU 06 Encounter: [id number]    Assessment    Principal Problem:    Atrial fibrillation with rapid ventricular response (HCC)  Active Problems:    Other emphysema (HCC)    Acute on chronic diastolic congestive heart failure (HCC)    Closed wedge compression fracture of T12 vertebra (HCC)    Chronic respiratory failure with hypoxia (HCC)    Type 2 diabetes mellitus without complication, without long-term current use of insulin (HCC)      Home Pulmonary Medications:    Home Devices/Therapy: Home O2, Other (Comment)(home o2 3l/m, nebs every 6 hours, mdi's)    Past Medical History:   Diagnosis Date    Allergies     Asthma     Chronic diastolic CHF (congestive heart failure) (HCC)     Colitis     Colon polyp     COPD (chronic obstructive pulmonary disease)     Diverticulosis     DJD (degenerative joint disease)     Gait abnormality     Hypertension     Paroxysmal atrial fibrillation (HCC)      Social History     Socioeconomic History    Marital status:       Spouse name: None    Number of children: None    Years of education: None    Highest education level: None   Occupational History    Occupation: retired    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   Social Needs    Financial resource strain: None    Food insecurity     Worry: None     Inability: None    Transportation needs     Medical: None     Non-medical: None   Tobacco Use    Smoking status: Former Smoker     Quit date: 2013     Years since quittin 4    Smokeless tobacco: Never Used   Substance and Sexual Activity    Alcohol use: Never     Alcohol/week: 0 0 standard drinks     Frequency: Never     Drinks per session: Patient refused     Binge frequency: Never    Drug use: Never    Sexual activity: Not Currently   Lifestyle    Physical activity     Days per week: None     Minutes per session: None    Stress: None   Relationships    Social connections     Talks on phone: None     Gets together: None     Attends Buddhism service: None     Active member of club or organization: None     Attends meetings of clubs or organizations: None     Relationship status: None    Intimate partner violence     Fear of current or ex partner: None     Emotionally abused: None     Physically abused: None     Forced sexual activity: None   Other Topics Concern    None   Social History Narrative    Retired        Daily caffeine use- 2 cups of coffee       Subjective         Objective    Physical Exam:   Assessment Type: Pre-treatment  General Appearance: Alert, Awake    Vitals:  Blood pressure 130/80, pulse (!) 124, temperature 98 8 °F (37 1 °C), temperature source Tympanic, resp  rate 22, height 5' (1 524 m), weight 57 1 kg (125 lb 14 1 oz), SpO2 97 %, not currently breastfeeding  Imaging and other studies: I have personally reviewed pertinent reports              Plan    Respiratory Plan: Home Bronchodilator Patient pathway        Resp Comments: (P) pt assessed as per protocol, pt states she wears 3l/m o2 at home cont except when she is working, pt uses nebs every 6 hours during the day and mdi's

## 2021-04-26 NOTE — ASSESSMENT & PLAN NOTE
Lab Results   Component Value Date    HGBA1C 12 6 (A) 04/09/2021       Recent Labs     04/25/21  1332 04/25/21  1653 04/25/21 2058 04/26/21  0719   POCGLU 207* 197* 158* 139       Blood Sugar Average: Last 72 hrs:  · (P) 175 25   · Oral hypoglycemics remain on hold  · Will add Lantus for basal coverage while here in the hospital  · Continue Accu-Cheks AC and HS with sliding scale coverage  · Continue gabapentin for diabetic neuropathy

## 2021-04-27 NOTE — ASSESSMENT & PLAN NOTE
Wt Readings from Last 3 Encounters:   04/27/21 59 8 kg (131 lb 13 4 oz)   04/12/21 60 5 kg (133 lb 4 3 oz)   04/09/21 60 8 kg (134 lb)     · Suspected mild exacerbation in the setting of AFib with RVR  · Elevated BNP noted   · Last echocardiogram was obtained on 02/22/2021 which shows LVEF of 60%  Grade 1 diastolic dysfunction    · Cardiology input appreciated   · Status post treatment with 2 doses of IV Lasix  · Resume home Lasix therapy today -40 mg p o  B i d

## 2021-04-27 NOTE — NURSING NOTE
3L/min NC maintained  VS and sats stable  Denies dyspnes, shortness of breath and cardiac symptoms  Amiodarone continues @ 0 5mg/min

## 2021-04-27 NOTE — PLAN OF CARE
Problem: OCCUPATIONAL THERAPY ADULT  Goal: Performs self-care activities at highest level of function for planned discharge setting  See evaluation for individualized goals  Description: Treatment Interventions: ADL retraining, Visual perceptual retraining, Functional transfer training, UE strengthening/ROM, Endurance training, Patient/family training, Compensatory technique education, Energy conservation          See flowsheet documentation for full assessment, interventions and recommendations  Note: Limitation: Decreased ADL status, Decreased UE strength, Decreased endurance, Decreased self-care trans, Decreased high-level ADLs  Prognosis: Good  Assessment: Patient is a 82 y o  female seen for OT evaluation s/p admit to [facility]  on 4/25/2021 w/Atrial fibrillation with rapid ventricular response ([facility] [address] )  Commorbidities affecting patient's functional performance at time of assessment include: acute on chronic diastolic congestive heart failure, chronic respiratory failure, closed wedge compression fx of T12, anemia and DM2  Orders placed for OT evaluation and treatment and up with assistance  Performed at least two patient identifiers during session including name and wristband  Prior to admission, Pt lives in [address] with 3 [address] c HR  Pt reports PLOF as (I) c ADLs, needs assistance with IADLs, retired, and does not drive  Pt reports no use of AD at baseline  Pt has been receiving rehab services at [facility]   Personal factors affecting patient at time of initial evaluation include: limited caregiver support, steps to enter, difficulty performing ADLs and difficulty performing IADLs  Upon evaluation, patient requires supervision assist for UB ADLs, minimal  assist for LB ADLs, transfers and functional ambulation in room and bathroom with supervision assist, with the use of Rolling Walker    Patient is oriented x 4 and presents with limited ability to recall information after a brief period of time (short term memory) / working memory   Occupational performance is affected by the following deficits: decreased muscle strength, dynamic sit/ stand balance deficit with poor standing tolerance time for self care and functional mobility, decreased activity tolerance, impaired depth perception, decreased safety awareness and delayed righting and equilibrium reactions  Patient to benefit from continued Occupational Therapy treatment while in the hospital to address deficits as defined above and maximize level of functional independence with ADLs and functional mobility  Occupational Performance areas to address include: grooming , bathing/ shower, dressing, toilet hygiene, transfer to all surfaces, functional mobility, medication routine/ management, IADLS: Household maintenance, IADLs: safety procedures and IADLs: meal prep/ clean up  From OT standpoint, recommendation at time of d/c would be Home with home health rehabilitation         OT Discharge Recommendation: Home with home health rehabilitation  OT - OK to Discharge: Yes(Once medically cleared )     Karolyn Parrish OT

## 2021-04-27 NOTE — PHYSICAL THERAPY NOTE
Physical Therapy Treatment Session Note    Patient's Name: Jabier Nowak    Admitting Diagnosis  Palpitations [R00 2]  Hypokalemia [E87 6]  Rapid heart rate [R00 0]  COPD exacerbation ([facility] ) [J44 1]  Atrial fibrillation with rapid ventricular response ([facility] ) [I48 91]    Problem List  Patient Active Problem List   Diagnosis    Asthma    Other emphysema ([facility] )    Cardiac disease    Diverticulosis of intestine with bleeding    Diarrhea    Colorectal polyps    Effusion of bursa of left elbow    Cardiomegaly    Chronic anxiety    Chronic cystitis    Chronic bilateral low back pain without sciatica    Acute on chronic diastolic congestive heart failure ([address] )    Deviated nasal septum    Diverticulosis of colon    Gastroesophageal reflux disease without esophagitis    Gout    History of angioedema    History of colonic polyps    Lumbar radiculopathy    Macular degeneration of both eyes    Obesity    Osteoporosis    Other specified forms of chronic ischemic heart disease    Seasonal allergies    Panic attack    Vocal cord dysfunction    Colitis presumed infectious    Atrial fibrillation with rapid ventricular response ([facility] )    Left elbow pain    Acute on chronic respiratory failure with hypoxia ([facility] )    Debility    Positive culture findings in sputum    Compression fracture of L4 vertebra (HCC)    Closed wedge compression fracture of T12 vertebra (HCC)    Wound dehiscence, surgical, subsequent encounter    Chronic peptic ulcer of stomach    Chronic pain syndrome    Sacroiliitis (HCC)    Bronchitis, chronic, simple (HCC)    Paroxysmal atrial fibrillation (HCC)    Chronic diastolic CHF (congestive heart failure) (HCC)    SIRS (systemic inflammatory response syndrome) (HCC)    Hypokalemia    Shortness of breath    Gastrostomy complication, unspecified (HCC)    Anxiety    Chronic respiratory failure with hypoxia (HCC)    Absolute anemia    Encounter for long-term opiate analgesic use    Uncomplicated opioid dependence ([facility] )    Type 2 diabetes mellitus without complication, without long-term current use of insulin ([facility] )    Ambulatory dysfunction    History of recent hospitalization    Iron deficiency anemia       Past Medical History  Past Medical History:   Diagnosis Date    Allergies     Asthma     Chronic diastolic CHF (congestive heart failure) (HCC)     Colitis     Colon polyp     COPD (chronic obstructive pulmonary disease)     Diverticulosis     DJD (degenerative joint disease)     Gait abnormality     Hypertension     Paroxysmal atrial fibrillation (HCC)        Past Surgical History  Past Surgical History:   Procedure Laterality Date    BLADDER SURGERY      COLON SURGERY      COLONOSCOPY      COLONOSCOPY W/ POLYPECTOMY N/A 1/21/2019    Procedure: COLONOSCOPY W/ POLYPECTOMY;  Surgeon: Valencia Roper MD;  Location: [address] [place] [address] GI LAB; Service: General    FL GUIDED NEEDLE PLAC BX/ASP/INJ  9/2/2020    FL GUIDED NEEDLE PLAC BX/ASP/INJ  1/21/2021    GASTROJEJUNOSTOMY W/ JEJUNOSTOMY TUBE N/A 2/3/2020    Procedure: Antrectomy with bilroth II Anastomosis;   Surgeon: Joe Osullivan MD;  [facility];  Service: General   Abbe Jarvis JOINT Right 9/2/2020    Procedure: BLOCK / INJECTION SACROILIAC;  Surgeon: Elisa Kirk MD;  Location: [facility];  Service: Pain Management     OR INJECTION,SACROILIAC JOINT Right 1/21/2021    Procedure: RIGHT SACROILIAC JOINT INJECTION;  Surgeon: Elisa Kirk MD;  [facility];  Service: Pain Management     SMALL INTESTINE SURGERY  03/2020    with partial gastrectomy        04/27/21 1318   PT Last Visit   PT Visit Date 04/27/21   Note Type   Note Type Treatment   Pain Assessment   Pain Assessment Tool Pain Assessment not indicated - pt denies pain   Pain Score No Pain   Restrictions/Precautions   Weight Bearing Precautions Per Order No   Other Precautions Multiple lines;Telemetry;O2;Fall Risk  (3 L O2 via NC)   General   Chart Reviewed Yes   Response to Previous Treatment Patient with no complaints from previous session   (no complaints following am assessment)   Family/Caregiver Present No   Cognition   Overall Cognitive Status WFL   Arousal/Participation Alert; Responsive; Cooperative   Attention Within functional limits   Orientation Level Oriented X4   Memory Decreased recall of precautions  (impulsivity)   Following Commands Follows one step commands without difficulty   Comments Pt requested toilet usage,agreeable to mobilize to toilet  Subjective   Subjective "I didn't know there was a toilet in here"   Bed Mobility   Additional Comments DNT bed mobility as pt  was OOB prior/after session  Transfers   Sit to Stand 5  Supervision   Additional items Assist x 1; Impulsive;Verbal cues   Stand to Sit 5  Supervision   Additional items Assist x 1;Verbal cues   Stand pivot 5  Supervision   Additional items Assist x 1;Verbal cues   Toilet transfer 5  Supervision  (close)   Additional items Assist x 1;Verbal cues;Standard toilet   Ambulation/Elevation   Gait pattern Improper Weight shift; Forward Flexion; Short stride; Inconsistent uri   Gait Assistance 5  Supervision  (close S<->CGA w/directional changes x 2)   Additional items Assist x 1;Verbal cues   Assistive Device Rolling walker   Distance 30 feet   Stair Management Assistance Not tested   Balance   Static Sitting Good   Dynamic Sitting Fair +   Static Standing Fair +   Dynamic Standing Fair   Ambulatory Fair   Endurance Deficit   Endurance Deficit No   Activity Tolerance   Activity Tolerance Patient tolerated treatment well   Nurse Made Aware yes, Christiano RN & Ponce oCates RN   Assessment   Prognosis Fair   Problem List Decreased strength;Decreased mobility; Impaired balance; Impaired vision   Assessment Pt seen for PT treatment session this date with interventions consisting of gait training w/ emphasis on improving pt's ability to ambulate level surfaces x 30 feet with close S<->CGA provided by therapist with RW  Pt agreeable to PT treatment session upon arrival, pt found seated OOB in chair, A&O x 4  In comparison to previous session, pt with improvements in balance  Post session: all needs in reach and RN notified of session findings/recommendations  Continue to recommend home with home health rehabilitation at time of d/c in order to maximize pt's functional independence and safety w/ mobility  Pt continues to be functioning below baseline level, and remains limited 2* factors listed above and including weakness, impaired balance, impulsivity,gait deviations  PT will continue to see pt during current hospitalization in order to address the deficits listed above and provide interventions consistent w/ POC in effort to achieve LTGs  Goals   Patient Goals to get out of the hospital soon   LTG Expiration Date 05/07/21   Long Term Goal #1 LTGs remain appropriate   PT Treatment Day 1   Plan   Treatment/Interventions Functional transfer training;LE strengthening/ROM; Therapeutic exercise; Endurance training;Patient/family training;Equipment eval/education;Gait training;Spoke to nursing   Progress Progressing toward goals   PT Frequency Other (Comment)  (3-5x/wk)   Recommendation   PT Discharge Recommendation Home with home health rehabilitation   Equipment Recommended Walker  (pt  has own)   PT - OK to Discharge Yes  (when medically stable, from functional perspective)   Additional Comments Upon conclusion, pt  was OOB w/all needs within reach  Additional Comments 2 Pt's raw score on the AM-[facility] Basic Mobility inpatient short form is 18, standardized score is 41 05  Patients at this level are likely to benefit from DC to [address]  However, please refer to therapist recommendation for safe DC planning     AM-PAC Basic Mobility Inpatient   Turning in Bed Without Bedrails 3   Lying on Back to Sitting on Edge of Flat Bed 3   Moving Bed to Chair 3   Standing Up From Chair 3   Walk in Room 3   Climb 3-5 Stairs 3   Basic Mobility Inpatient Raw Score 18   Basic Mobility Standardized Score 41 05     Treatment Time: 4710-3606    Karen Martinez, PT

## 2021-04-27 NOTE — ASSESSMENT & PLAN NOTE
Lab Results   Component Value Date    HGBA1C 12 6 (A) 04/09/2021       Recent Labs     04/26/21  1020 04/26/21  1615 04/26/21 2038 04/27/21  0550   POCGLU 197* 164* 234* 130       Blood Sugar Average: Last 72 hrs:  · (P) 178 25   · Oral hypoglycemics remain on hold  · Continue Lantus for basal coverage while here in the hospital  · Continue Accu-Cheks AC and HS with sliding scale coverage  · Continue gabapentin for diabetic neuropathy

## 2021-04-27 NOTE — ASSESSMENT & PLAN NOTE
· Patient remains in a state of AFib with RVR  · Patient is now on an amiodarone drip in view of  low blood pressure readings  · Appreciate cardiology input  · Current rate-controlling regimen includes 1  An amiodarone drip 2  Diltiazem 60 mg p o  Q6H and 3  Metoprolol tartrate 12 5 mg p o   Q12H   · Continue apixaban 2 5 mg p o  B i d  For anticoagulation purposes  · Continued management as per Cardiology  · Continue present management here in the step-down unit

## 2021-04-27 NOTE — NURSING NOTE
3L/min NC maintained; VS and sats stable  Lung sounds coarse and decreased bilaterally  Amiodarone infusing RH; Cardizem off 2030(per Cardiology)  Afib/intermittent SR/PACs  Sleeping intermittently; arouses to name  Oriented when awake

## 2021-04-27 NOTE — PHYSICAL THERAPY NOTE
Physical Therapy Evaluation     Patient's Name: Andreas Bound    Admitting Diagnosis  Palpitations [R00 2]  Hypokalemia [E87 6]  Rapid heart rate [R00 0]  COPD exacerbation ([address] ) [J44 1]  Atrial fibrillation with rapid ventricular response ([address] ) [I48 91]    Problem List  Patient Active Problem List   Diagnosis    Asthma    Other emphysema ([address] )    Cardiac disease    Diverticulosis of intestine with bleeding    Diarrhea    Colorectal polyps    Effusion of bursa of left elbow    Cardiomegaly    Chronic anxiety    Chronic cystitis    Chronic bilateral low back pain without sciatica    Acute on chronic diastolic congestive heart failure (HCC)    Deviated nasal septum    Diverticulosis of colon    Gastroesophageal reflux disease without esophagitis    Gout    History of angioedema    History of colonic polyps    Lumbar radiculopathy    Macular degeneration of both eyes    Obesity    Osteoporosis    Other specified forms of chronic ischemic heart disease    Seasonal allergies    Panic attack    Vocal cord dysfunction    Colitis presumed infectious    Atrial fibrillation with rapid ventricular response ([address] )    Left elbow pain    Acute on chronic respiratory failure with hypoxia ([address] )    Debility    Positive culture findings in sputum    Compression fracture of L4 vertebra (HCC)    Closed wedge compression fracture of T12 vertebra (HCC)    Wound dehiscence, surgical, subsequent encounter    Chronic peptic ulcer of stomach    Chronic pain syndrome    Sacroiliitis (HCC)    Bronchitis, chronic, simple (HCC)    Paroxysmal atrial fibrillation (HCC)    Chronic diastolic CHF (congestive heart failure) (HCC)    SIRS (systemic inflammatory response syndrome) (HCC)    Hypokalemia    Shortness of breath    Gastrostomy complication, unspecified ([address] )    Anxiety    Chronic respiratory failure with hypoxia ([address] )    Absolute anemia    Encounter for long-term opiate analgesic use  [facility]    Type 2 diabetes mellitus without complication, without long-term current use of insulin ([facility] )    Ambulatory dysfunction    History of recent hospitalization    Iron deficiency anemia       Past Medical History  Past Medical History:   Diagnosis Date    Allergies     Asthma     Chronic diastolic CHF (congestive heart failure) (HCC)     Colitis     Colon polyp     COPD (chronic obstructive pulmonary disease)     Diverticulosis     DJD (degenerative joint disease)     Gait abnormality     Hypertension     Paroxysmal atrial fibrillation (HCC)        Past Surgical History  Past Surgical History:   Procedure Laterality Date    BLADDER SURGERY      COLON SURGERY      COLONOSCOPY      COLONOSCOPY W/ POLYPECTOMY N/A 1/21/2019    Procedure: COLONOSCOPY W/ POLYPECTOMY;  Surgeon: Ghanshyam Ramey MD;  Location: [address] [place] [address] GI LAB; Service: General    FL GUIDED NEEDLE PLAC BX/ASP/INJ  9/2/2020    FL GUIDED NEEDLE PLAC BX/ASP/INJ  1/21/2021    GASTROJEJUNOSTOMY W/ JEJUNOSTOMY TUBE N/A 2/3/2020    Procedure: Antrectomy with bilroth II Anastomosis; Surgeon: Sky Castañeda MD;  Location:  MAIN OR;  Service: Particia Lown JOINT Right 9/2/2020    Procedure: BLOCK / INJECTION SACROILIAC;  Surgeon: Liz Alexis MD;  [facility]: [facility];  Service: Pain Management     PA INJECTION,SACROILIAC JOINT Right 1/21/2021    Procedure: RIGHT SACROILIAC JOINT INJECTION;  Surgeon: Liz Alexis MD;  [facility]: [facility];  Service: Pain Management     SMALL INTESTINE SURGERY  03/2020    with partial gastrectomy        04/27/21 0932   PT Last Visit   PT Visit Date 04/27/21   Note Type   Note type Evaluation   Pain Assessment   Pain Assessment Tool Pain Assessment not indicated - pt denies pain   Pain Score No Pain   Home Living   Type of [address] One level;Performs ADLs on one level; Able to live on main level with bedroom/bathroom;Stairs to enter with rails  (3 [address])   Bathroom Shower/Tub Tub/shower unit   Bathroom Toilet Standard   Bathroom Equipment Grab bars in shower;Grab bars around toilet   [facility]  (RW)   Prior Function   Level of [place] Independent with ADLs and functional mobility; Needs assistance with IADLs   Lives With Alone   Receives Help From Friend(s); Neighbor   ADL Assistance Independent   IADLs Needs assistance  (transportation,meal preparation)   Falls in the last 6 months 0  (denies)   Vocational Retired   Restrictions/Precautions   [place] Bearing Precautions Per Order No   Other Precautions Multiple lines;Telemetry;O2;Fall Risk;Visual impairment  (3 L O2 via NC)   General   Additional Pertinent History Emanuel was receiving PAR at [facility] prior to admission  Family/Caregiver Present No   Cognition   Overall Cognitive Status WFL   Arousal/Participation Alert   Orientation Level Oriented X4   Memory Decreased recall of precautions   Following Commands Follows one step commands without difficulty   Comments Pt  agreeable to PT assessment, pleasant  RLE Assessment   RLE Assessment X  (4-/5 gross musculature)   LLE Assessment   LLE Assessment X  (4-/5 gross musculature)   Coordination   Movements are Fluid and Coordinated 1   Bed Mobility   Additional Comments DNT bed mobility as pt  was sitting OOB on chair prior/after session  Transfers   Sit to Stand 5  Supervision   Additional items Assist x 1; Armrests; Verbal cues   Stand to Sit 5  Supervision   Additional items Assist x 1; Armrests; Verbal cues   Stand pivot 5  Supervision   Additional items Assist x 1;Verbal cues   Ambulation/Elevation   Gait pattern Improper Weight shift; Forward Flexion; Short stride   Gait Assistance 5  Supervision  (close)   Additional items Assist x 1;Verbal cues   Assistive Device Rolling walker   Distance 40 feet total  (in room, directional changes x 4)   Stair Management Assistance Not tested   Balance   Static Sitting Good   Dynamic Sitting Fair +   Static Standing Fair   Dynamic Standing Fair   Ambulatory Fair   Endurance Deficit   Endurance Deficit Yes   Endurance Deficit Description HR elevated 146 BPM->125 BPM->157 BPM;O2 desaturation to 83% with recovery to 96% within 2 minutes of breathing conservation technique education  Activity Tolerance   Activity Tolerance Treatment limited secondary to medical complications (Comment)   Medical Staff Made Aware yes, [address] OT   Nurse Made Aware yes, Tejas Hernandez RN   Assessment   Prognosis Good   Problem List Decreased strength;Decreased endurance;Decreased mobility; Impaired balance; Impaired vision   Assessment Pt is 82 y o  female seen for PT evaluation s/p admit to [address] [place] [address] ICU on 4/25/2021 w/ Atrial fibrillation with rapid ventricular response ([address] )  PT consulted to assess pt's functional mobility and d/c needs  Order placed for PT eval and tx, w/ up w/ A order  Comorbidities affecting pt's physical performance at time of assessment include:A-fib with RVR, acute on chronic CHF, chronic respiratory failure with hypoxia, T12 compression fx, iron deficient anemia, emphysema, DM   PTA, pt was receiving PAR at the [facility]  Personal factors affecting pt at time of IE include: inability to navigate community distances, unable to perform dynamic tasks in community, visual impairments, limited insight into impairments and inability to perform ADLs  Please find objective findings from PT assessment regarding body systems outlined above with impairments and limitations including weakness, impaired balance, decreased endurance, gait deviations, decreased activity tolerance, decreased functional mobility tolerance, decreased safety awareness, fall risk and SOB upon exertion  The following objective measures performed on IE also reveal limitations: AM-PAC 6-Clicks: 43/59   Pt's clinical presentation is currently unstable/unpredictable seen in pt's presentation of JOHNSON with maintained supplemental O2, elevated HR with ongoing telemetry monitoring, abnormal lab values, progressive symptoms prior to hospitalization  Pt to benefit from continued PT tx to address deficits as defined above and maximize level of functional independent mobility and consistency  From PT/mobility standpoint, recommendation at time of d/c would be home with home health rehabilitation pending progress in order to facilitate return to PLOF  Goals   Patient Goals to finish her 5 days of PAR   LTG Expiration Date 05/07/21   Long Term Goal #1  Patient will complete transfers modified I to decrease risk of falls, facilitate upright standing posture  BLE strength to greater than/equal to 4/5 gross musculature to increase ability to safely transfer, control descent to chair  Patient will exhibit increase dynamic standing balance to Good > 3 minutes without LOB and supervision of 1 to improve activity tolerance  Patient will exhibit increase dynamic ambulatory balance to Good >100 feet w/AD supervision of 1  to improve ability to mobilize to toilet, chair and decrease risk for additional medical complications  Patient will exhibit good self monitoring and ability to follow 2 step commands to increase complexity of tasks and resume ADL's without LOB  PT Treatment Day 0   Plan   Treatment/Interventions Functional transfer training;LE strengthening/ROM; Therapeutic exercise; Endurance training;Patient/family training;Equipment eval/education;Gait training; Compensatory technique education;Spoke to nursing;OT   PT Frequency Other (Comment)  (3-5x/wk)   Recommendation   PT Discharge Recommendation Home with home health rehabilitation   Equipment Recommended Walker  (pt  has own)   PT - OK to Discharge Yes  (when medically stable, from functional perspective)   Additional Comments Upon conclusion, pt  was sitting OOB on chair w/all needs within reach  Additional Comments 2 Pt's raw score on the AM-PAC Basic Mobility inpatient short form is 18, standardized score is 41 05  Patients at this level are likely to benefit from DC to [address]  However, please refer to therapist recommendation for safe DC planning     AM-PAC Basic Mobility Inpatient   Turning in Bed Without Bedrails 3   Lying on Back to Sitting on Edge of Flat Bed 3   Moving Bed to Chair 3   Standing Up From Chair 3   Walk in Room 3   Climb 3-5 Stairs 3   Basic Mobility Inpatient Raw Score 18   Basic Mobility Standardized Score 41 05       Laquita Vail, PT

## 2021-04-27 NOTE — PLAN OF CARE
Problem: PHYSICAL THERAPY ADULT  Goal: Performs mobility at highest level of function for planned discharge setting  See evaluation for individualized goals  Description: Treatment/Interventions: Functional transfer training, LE strengthening/ROM, Therapeutic exercise, Endurance training, Patient/family training, Equipment eval/education, Gait training, Compensatory technique education, Spoke to nursing, OT  Equipment Recommended: Walker(pt  has own)       See flowsheet documentation for full assessment, interventions and recommendations  4/27/2021 1409 by Bartolo Block PT  Outcome: Progressing  Note: Prognosis: Fair  Problem List: Decreased strength, Decreased mobility, Impaired balance, Impaired vision  Assessment: Pt seen for PT treatment session this date with interventions consisting of gait training w/ emphasis on improving pt's ability to ambulate level surfaces x 30 feet with close S<->CGA provided by therapist with RW  Pt agreeable to PT treatment session upon arrival, pt found seated OOB in chair, A&O x 4  In comparison to previous session, pt with improvements in balance  Post session: all needs in reach and RN notified of session findings/recommendations  Continue to recommend home with home health rehabilitation at time of d/c in order to maximize pt's functional independence and safety w/ mobility  Pt continues to be functioning below baseline level, and remains limited 2* factors listed above and including weakness, impaired balance, impulsivity,gait deviations  PT will continue to see pt during current hospitalization in order to address the deficits listed above and provide interventions consistent w/ POC in effort to achieve LTGs  PT Discharge Recommendation: Home with home health rehabilitation     PT - OK to Discharge: Yes(when medically stable, from functional perspective)    See flowsheet documentation for full assessment

## 2021-04-27 NOTE — PROGRESS NOTES
[facility]  Progress Note - Syed Montanez 1935, 82 y o  female MRN: [id number]  Unit/Bed#: ICU 06 Encounter: [id number]  Primary Care Provider: LEEROY Ronquillo   Date and time admitted to hospital: 4/25/2021  6:51 AM    * Atrial fibrillation with rapid ventricular response ([address] )  Assessment & Plan  · Patient remains in a state of AFib with RVR  · Patient is now on an amiodarone drip in view of  low blood pressure readings  · Appreciate cardiology input  · Current rate-controlling regimen includes 1  An amiodarone drip 2  Diltiazem 60 mg p o  Q6H and 3  Metoprolol tartrate 12 5 mg p o  Q12H   · Continue apixaban 2 5 mg p o  B i d  For anticoagulation purposes  · Continued management as per Cardiology  · Continue present management here in the step-down unit    Acute on chronic diastolic congestive heart failure (HCC)  Assessment & Plan  Wt Readings from Last 3 Encounters:   04/27/21 59 8 kg (131 lb 13 4 oz)   04/12/21 60 5 kg (133 lb 4 3 oz)   04/09/21 60 8 kg (134 lb)     · Suspected mild exacerbation in the setting of AFib with RVR  · Elevated BNP noted   · Last echocardiogram was obtained on 02/22/2021 which shows LVEF of 60%  Grade 1 diastolic dysfunction  · Cardiology input appreciated   · Status post treatment with 2 doses of IV Lasix  · Resume home Lasix therapy today -40 mg p o  B i d     Other emphysema (HCC)  Assessment & Plan  · Without acute exacerbation  · Continue with inhalers which include Spiriva and Symbicort  · Continue theophylline and Singulair  · Continue patient's chronic steroid therapy-prednisone 5 mg p o  B i d   · Continue concomitant Fosamax, calcium carbonate and vitamin D therapy  · Continue respiratory protocol-continue p r n   Levalbuterol    Chronic respiratory failure with hypoxia (HCC)  Assessment & Plan  · Baseline requirements of supplemental oxygen at  home are 2-3 L via nasal cannula for end-stage COPD  · Patient remains on 3 L of supplemental oxygen here in the hospital    Closed wedge compression fracture of T12 vertebra ([address] )  Assessment & Plan  · Continue with physical therapy/ occupational therapy   · Pain management with the lidocaine patch    Type 2 diabetes mellitus without complication, without long-term current use of insulin [facility])  Assessment & Plan  Lab Results   Component Value Date    HGBA1C 12 6 (A) 2021       Recent Labs     21  1020 21  1615 21  2038 21  0550   POCGLU 197* 164* 234* 130       Blood Sugar Average: Last 72 hrs:  · (P) 178 25   · Oral hypoglycemics remain on hold  · Continue Lantus for basal coverage while here in the hospital  · Continue Accu-Cheks AC and HS with sliding scale coverage  · Continue gabapentin for diabetic neuropathy    Iron deficiency anemia  Assessment & Plan  · Mild drop in H&H noted-patient otherwise remains stable  · Continue iron supplementation        VTE Prophylaxis:  Apixaban (Eliquis)    Patient Centered Rounds: I have performed bedside rounds with nursing staff today  Discussions with Specialists or Other Care Team Provider:  Case management, nursing, Cardiology, pharmacy  Education and Discussions with Family / Patient:  Attempts to call the patient's granddaughter Molly Holland at phone number [phone] at 9:07 a m -no answer straight to voicemail    Current Length of Stay: 2 day(s)    Current Patient Status: Inpatient   Certification Statement: The patient will continue to require additional inpatient hospital stay due to The need for an IV amiodarone drip    Discharge Plan:  Discharge planning will commence as soon as the patient has been cleared from a cardiology standpoint    Code Status: Level 1 - Full Code    Subjective:   Patient seen and examined, sitting up in a chair, continues to feel weak  Denies any pain or discomfort  Is frustrated about being in an out of the hospital so much lately      Objective:     Vitals:   Temp (24hrs), Av 6 °F (36 4 °C), Min:96 5 °F (35 8 °C), Max:98 6 °F (37 °C)    Temp:  [96 5 °F (35 8 °C)-98 6 °F (37 °C)] 98 °F (36 7 °C)  HR:  [] 97  Resp:  [14-42] 19  BP: ()/(45-71) 90/60  SpO2:  [90 %-100 %] 98 %  Body mass index is 25 75 kg/m²  Input and Output Summary (last 24 hours): Intake/Output Summary (Last 24 hours) at 4/27/2021 0906  Last data filed at 4/27/2021 0736  Gross per 24 hour   Intake 1578 73 ml   Output 750 ml   Net 828 73 ml       Physical Exam:   Physical Exam  Constitutional:       General: She is not in acute distress  Appearance: Normal appearance  She is normal weight  She is not ill-appearing  HENT:      Head: Normocephalic and atraumatic  Nose: Nose normal       Mouth/Throat:      Mouth: Mucous membranes are moist    Eyes:      Extraocular Movements: Extraocular movements intact  Pupils: Pupils are equal, round, and reactive to light  Neck:      Musculoskeletal: Normal range of motion and neck supple  No neck rigidity  Cardiovascular:      Pulses: Normal pulses  Heart sounds: Normal heart sounds  No murmur  No friction rub  No gallop  Comments: S1 plus S2, irregularly irregular  Pulmonary:      Effort: Pulmonary effort is normal  No respiratory distress  Breath sounds: Normal breath sounds  No wheezing, rhonchi or rales  Abdominal:      General: There is no distension  Palpations: Abdomen is soft  There is no mass  Tenderness: There is no abdominal tenderness  Hernia: No hernia is present  Musculoskeletal: Normal range of motion  General: No swelling or tenderness  Right lower leg: No edema  Left lower leg: No edema  Skin:     General: Skin is warm  Capillary Refill: Capillary refill takes less than 2 seconds  Findings: No erythema or rash  Neurological:      General: No focal deficit present  Mental Status: She is alert and oriented to person, place, and time  Mental status is at baseline  Cranial Nerves: No cranial nerve deficit  Motor: No weakness  Psychiatric:         Mood and Affect: Mood normal          Behavior: Behavior normal          Additional Data:     Labs:    Results from last 7 days   Lab Units 04/27/21  0523 04/26/21  0526   WBC Thousand/uL 9 50 8 40   HEMOGLOBIN g/dL 8 7* 10 1*   HEMATOCRIT % 28 1* 32 3*   PLATELETS Thousands/uL 390 403*   NEUTROS PCT %  --  79*   LYMPHS PCT %  --  14*   MONOS PCT %  --  7   EOS PCT %  --  0     Results from last 7 days   Lab Units 04/27/21  0523   SODIUM mmol/L 138   POTASSIUM mmol/L 3 9   CHLORIDE mmol/L 104   CO2 mmol/L 27   BUN mg/dL 10   CREATININE mg/dL 0 63   CALCIUM mg/dL 7 5*     Results from last 7 days   Lab Units 04/25/21  0702   INR  1 10     Results from last 7 days   Lab Units 04/27/21  0550 04/26/21  2038 04/26/21  1615 04/26/21  1020 04/26/21  0719 04/25/21  2058 04/25/21  1653 04/25/21  1332 04/25/21  0444 04/24/21  1715 04/24/21  0427 04/23/21  0422   POC GLUCOSE mg/dl 130 234* 164* 197* 139 158* 197* 207* 98 151* 102 99           * I Have Reviewed All Lab Data Listed Above  * Additional Pertinent Lab Tests Reviewed:  Jacinda [facility] Admission  Reviewed    Imaging:  Imaging Reports Reviewed Today Include:  None    Recent Cultures (last 7 days):           Last 24 Hours Medication List:   Current Facility-Administered Medications   Medication Dose Route Frequency Provider Last Rate    acetaminophen  650 mg Oral Q4H PRN Botkins LEEROY Xavier      albuterol  2 5 mg Nebulization Q4H PRN Botkins LEEROY Xavier      [START ON 5/1/2021] alendronate  70 mg Oral Weekly Before Breakfast BotkinsLEEROY Shanks      amiodarone  0 5 mg/min Intravenous Continuous Jean Lyn MD 0 5 mg/min (04/27/21 0400)    apixaban  2 5 mg Oral BID Botkins LEEROY Xavier      budesonide-formoterol  2 puff Inhalation BID Botkins LEEROY Xavier      calcium carbonate-vitamin D  1 tablet Oral Daily With Breakfast Marita Castellanos, CRNP      diltiazem  60 mg Oral [facility] Jacqueline Song MD      furosemide  40 mg Oral BID (diuretic) Marly Vargas MD      gabapentin  300 mg Oral TID Marita Castellanos, LEEROY      glycerin-hypromellose-  2 drop Both Eyes Q3H PRN Marita Castellanos, CRNP      HYDROcodone-acetaminophen  1 tablet Oral Q6H PRN Marita Castellanos, CRNP      hydrOXYzine HCL  25 mg Oral Q6H PRN Marita Castellanos, CRNP      insulin glargine  10 Units Subcutaneous HS Marly Vargas MD      insulin lispro  1-5 Units Subcutaneous TID AC Marita Castellanos, CRNP      insulin lispro  1-5 Units Subcutaneous HS Marita Castellanos, LEEROY      iron polysaccharides  150 mg Oral Daily Marita Castellanos, CRNP      levalbuterol  1 25 mg Nebulization TID Marita Castellanos, CRNP      And    sodium chloride  3 mL Nebulization TID Marita Castellanos, CRNP      lidocaine  1 patch Topical Daily Marita Castellanos, CRNP      magnesium oxide  400 mg Oral BID Marita Castellanos, CRNP      menthol-methyl salicylate   Apply externally 4x Daily PRN Marita Castellanos, CRNP      methocarbamol  500 mg Oral formerly [facility] Marita Castellanos, LEEROY      metoprolol  5 mg Intravenous Q6H PRN Elizabeth Prom Renetta Urena MD      metoprolol tartrate  12 5 mg Oral Q12H Marita Castellanos, CRNP      montelukast  10 mg Oral HS Marita Castellanos, CRNP      pantoprazole  40 mg Oral BID Marita Castellanos, CRNP      polyethylene glycol  17 g Oral Daily Marita Castellanos, CRNP      potassium chloride  40 mEq Oral Daily Marita Castellanos, CRNP      predniSONE  5 mg Oral BID With Meals Marita Castellanos, LEEROY      simethicone  80 mg Oral Q6H PRN Marita Castellanos, CRNP      theophylline  200 mg Oral Daily Marita Castellanos, CRNP      tiotropium  18 mcg Inhalation Daily Marita Castellanos, LEEROY          Today, Patient Was Seen By: Marly Vargas MD    ** Please Note: Dictation voice to text software may have been used in the creation of this document   **

## 2021-04-27 NOTE — PLAN OF CARE
Problem: PHYSICAL THERAPY ADULT  Goal: Performs mobility at highest level of function for planned discharge setting  See evaluation for individualized goals  Description: Treatment/Interventions: Functional transfer training, LE strengthening/ROM, Therapeutic exercise, Endurance training, Patient/family training, Equipment eval/education, Gait training, Compensatory technique education, Spoke to nursing, OT  Equipment Recommended: Walker(pt  has own)       See flowsheet documentation for full assessment, interventions and recommendations  Note: Prognosis: Good  Problem List: Decreased strength, Decreased endurance, Decreased mobility, Impaired balance, Impaired vision  Assessment: Pt is 82 y o  female seen for PT evaluation s/p admit to [address] [place] [address] ICU on 4/25/2021 w/ Atrial fibrillation with rapid ventricular response ([facility] [address] )  PT consulted to assess pt's functional mobility and d/c needs  Order placed for PT eval and tx, w/ up w/ A order  Comorbidities affecting pt's physical performance at time of assessment include:A-fib with RVR, acute on chronic CHF, chronic respiratory failure with hypoxia, T12 compression fx, iron deficient anemia, emphysema, DM   PTA, pt was receiving PAR at the [facility]  Personal factors affecting pt at time of IE include: inability to navigate community distances, unable to perform dynamic tasks in community, visual impairments, limited insight into impairments and inability to perform ADLs  Please find objective findings from PT assessment regarding body systems outlined above with impairments and limitations including weakness, impaired balance, decreased endurance, gait deviations, decreased activity tolerance, decreased functional mobility tolerance, decreased safety awareness, fall risk and SOB upon exertion  The following objective measures performed on IE also reveal limitations: AM-PAC 6-Clicks: 70/83   Pt's clinical presentation is currently unstable/unpredictable seen in pt's presentation of JOH[place] with maintained supplemental O2, elevated HR with ongoing telemetry monitoring, abnormal lab values, progressive symptoms prior to hospitalization  Pt to benefit from continued PT tx to address deficits as defined above and maximize level of functional independent mobility and consistency  From PT/mobility standpoint, recommendation at time of d/c would be home with home health rehabilitation pending progress in order to facilitate return to PLOF  PT Discharge Recommendation: Home with home health rehabilitation     PT - OK to Discharge: Yes(when medically stable, from functional perspective)    See flowsheet documentation for full assessment

## 2021-04-27 NOTE — OCCUPATIONAL THERAPY NOTE
Occupational Therapy Evaluation      [facility]    [facility]    Patient Active Problem List   Diagnosis    Asthma    Other emphysema ([facility] [address] )    Cardiac disease    Diverticulosis of intestine with bleeding    Diarrhea    Colorectal polyps    Effusion of bursa of left elbow    Cardiomegaly    Chronic anxiety    Chronic cystitis    Chronic bilateral low back pain without sciatica    Acute on chronic diastolic congestive heart failure (HCC)    Deviated nasal septum    Diverticulosis of colon    Gastroesophageal reflux disease without esophagitis    Gout    History of angioedema    History of colonic polyps    Lumbar radiculopathy    Macular degeneration of both eyes    Obesity    Osteoporosis    Other specified forms of chronic ischemic heart disease    Seasonal allergies    Panic attack    Vocal cord dysfunction    Colitis presumed infectious    Atrial fibrillation with rapid ventricular response (HCC)    Left elbow pain    Acute on chronic respiratory failure with hypoxia (HCC)    Debility    Positive culture findings in sputum    Compression fracture of L4 vertebra (HCC)    Closed wedge compression fracture of T12 vertebra (HCC)    Wound dehiscence, surgical, subsequent encounter    Chronic peptic ulcer of stomach    Chronic pain syndrome    Sacroiliitis (HCC)    Bronchitis, chronic, simple (HCC)    Paroxysmal atrial fibrillation (HCC)    Chronic diastolic CHF (congestive heart failure) (HCC)    SIRS (systemic inflammatory response syndrome) (HCC)    Hypokalemia    Shortness of breath    Gastrostomy complication, unspecified (HCC)    Anxiety    Chronic respiratory failure with hypoxia (HCC)    Absolute anemia    Encounter for long-term opiate analgesic use    Uncomplicated opioid dependence ([facility] [address] )    Type 2 diabetes mellitus without complication, without long-term current use of insulin ([facility] [address] )    Ambulatory dysfunction    History of recent hospitalization  Iron deficiency anemia       Past Medical History:   Diagnosis Date    Allergies     Asthma     Chronic diastolic CHF (congestive heart failure) (HCC)     Colitis     Colon polyp     COPD (chronic obstructive pulmonary disease)     Diverticulosis     DJD (degenerative joint disease)     Gait abnormality     Hypertension     Paroxysmal atrial fibrillation (HCC)        Past Surgical History:   Procedure Laterality Date    BLADDER SURGERY      COLON SURGERY      COLONOSCOPY      COLONOSCOPY W/ POLYPECTOMY N/A 1/21/2019    Procedure: COLONOSCOPY W/ POLYPECTOMY;  Surgeon: Nanci Kee MD;  Location: [address] [place] [address] GI LAB; Service: General    FL GUIDED NEEDLE PLAC BX/ASP/INJ  9/2/2020    FL GUIDED NEEDLE PLAC BX/ASP/INJ  1/21/2021    GASTROJEJUNOSTOMY W/ JEJUNOSTOMY TUBE N/A 2/3/2020    Procedure: Antrectomy with bilroth II Anastomosis; Surgeon: Halley Pickett MD;  Location:  MAIN OR;  Service: General   William Medal JOINT Right 9/2/2020    Procedure: BLOCK / INJECTION SACROILIAC;  Surgeon: Lissy Hsieh MD;  Location: [facility] MA[facility];  Service: Pain Management     MO INJECTION,SACROILIAC JOINT Right 1/21/2021    Procedure: RIGHT SACROILIAC JOINT INJECTION;  Surgeon: Lissy Hsieh MD;  Location: [facility];  Service: Pain Management     SMALL INTESTINE SURGERY  03/2020    with partial gastrectomy        04/27/21 0950   OT Last Visit   OT Visit Date 04/27/21   Note Type   Note type Evaluation   Restrictions/Precautions   Weight Bearing Precautions Per Order No   Other Precautions Multiple lines;Telemetry;O2;Fall Risk;Visual impairment  (3 L O2 via NC- used at baseline )   Pain Assessment   Pain Assessment Tool Pain Assessment not indicated - pt denies pain   Pain Score No Pain   Home Living   Type of [address] [place] [address] One level;Performs ADLs on one level; Able to live on main level with bedroom/bathroom;Stairs to enter with rails  (3 SAVANNAH)   Bathroom Shower/Tub Tub/shower unit   Bathroom Toilet Standard   Bathroom Equipment Grab bars in shower;Grab bars around toilet   [facility]  (utilizes RW PRN at home )   Prior Function   Level of [place] Independent with ADLs and functional mobility; Needs assistance with IADLs   Lives With Alone   Receives Help From Friend(s); Neighbor   ADL Assistance Independent   IADLs Needs assistance  (neighbor assists c transportation,meal preparation)   Falls in the last 6 months 0  (pt denies)   Vocational Retired   Comments (-) driving  neighbor comes every morning to assist c medication and meal preparation for the day  Lifestyle   Autonomy Pt lives in 1 story house with 3 SAVANNAH c HR  Pt reports PLOF as (I) c ADLs, needs assistance with IADLs, retired, and does not drive  Pt reports no use of AD at baseline  Pt has been receiving rehab services at [facility]   Reciprocal Relationships supportive neighbor    Service to Others retired    Intrinsic Gratification enjoys listening to e-books    ADL   [address] 6  Modified independent   [address] [facility]  4  Minimal Assistance   Bed Mobility   Additional Comments DNT bed mobility as pt  was sitting OOB on chair prior/after session  Transfers   Sit to Stand 5  Supervision   Additional items Assist x 1; Armrests; Verbal cues   Stand to Sit 5  Supervision   Additional items Assist x 1;Verbal cues;Armrests   Functional Mobility   Functional Mobility 5  Supervision   Additional Comments Pt ambulated in room with no LOB or SOB  HR noted to increase to 135 during ambulation and increased to 180 upon sitting in chair   TATUM Lawson made aware of outcomes    Additional items Rolling walker   Balance   Static Sitting Good   Dynamic Sitting Fair +   Static Standing Fair   Dynamic Standing Fair   Activity Tolerance   Activity Tolerance Treatment limited secondary to medical complications (Comment)  (elevated HR )   Medical Staff Made Aware PT Rhiannon Ibarra present during eval    Nurse Made Aware RN Sandra Saul verbalized pt appropriate for therapy    RUE Assessment   RUE Assessment WFL  (grossly 4-/5 MMT based on functional assessment )   LUE Assessment   LUE Assessment WFL  (grossly 4-/5 MMT based on functional assessment )   Hand Function   Gross Motor Coordination Functional   Fine Motor Coordination Functional   Sensation   Light Touch No apparent deficits  (per pt )   Vision-Basic Assessment   Current Vision Does not wear glasses   Visual History Macular degeneration; Other (Comment)  ("Stage 1 blindness" )   Patient Visual Report   (Pt able to see figures/outlines but not details )   Vision - Complex Assessment   Acuity   (Pt unable to reach name badge or clock )   Cognition   Overall Cognitive Status [facility]   Arousal/Participation Alert; Responsive; Cooperative   Attention Within functional limits   Orientation Level Oriented X4   Memory Decreased recall of precautions;Decreased short term memory   Following Commands Follows one step commands without difficulty   Comments Mini-Cog assessment performed  Version 1 was given  Patient able to recall 1/3 words  Patient able to draw an abnormal clock with the incorrect time  Total score of test was 1/5 indicating  further screening of cognition is recommended  Cognition Assessment Tools   (Mini-cog )   Score 1   Assessment   Limitation Decreased ADL status; Decreased UE strength;Decreased endurance;Decreased self-care trans;Decreased high-level ADLs   Prognosis Good   Assessment Patient is a 82 y o  female seen for OT evaluation s/p admit to [facility]  on 4/25/2021 w/Atrial fibrillation with rapid ventricular response ([facility] [address] )   Commorbidities affecting patient's functional performance at time of assessment include: acute on chronic diastolic congestive heart failure, chronic respiratory failure, closed wedge compression fx of T12, anemia and DM2  Orders placed for OT evaluation and treatment and up with assistance  Performed at least two patient identifiers during session including name and wristband  Prior to admission, Pt lives in 1 story house with 3 SAVANNAH c HR  Pt reports PLOF as (I) c ADLs, needs assistance with IADLs, retired, and does not drive  Pt reports no use of AD at baseline  Pt has been receiving rehab services at [facility]   Personal factors affecting patient at time of initial evaluation include: limited caregiver support, steps to enter, difficulty performing ADLs and difficulty performing IADLs  Upon evaluation, patient requires supervision assist for UB ADLs, minimal  assist for LB ADLs, transfers and functional ambulation in room and bathroom with supervision assist, with the use of Rolling Walker  Patient is oriented x 4 and presents with limited ability to recall information after a brief period of time (short term memory) / working memory   Occupational performance is affected by the following deficits: decreased muscle strength, dynamic sit/ stand balance deficit with poor standing tolerance time for self care and functional mobility, decreased activity tolerance, impaired depth perception, decreased safety awareness and delayed righting and equilibrium reactions  Patient to benefit from continued Occupational Therapy treatment while in the hospital to address deficits as defined above and maximize level of functional independence with ADLs and functional mobility  Occupational Performance areas to address include: grooming , bathing/ shower, dressing, toilet hygiene, transfer to all surfaces, functional mobility, medication routine/ management, IADLS: Household maintenance, IADLs: safety procedures and IADLs: meal prep/ clean up   From OT standpoint, recommendation at time of d/c would be Home with home health rehabilitation  Goals   [place]    Plan   Treatment Interventions ADL retraining;Visual perceptual retraining;Functional transfer training;UE strengthening/ROM; Endurance training;Patient/family training; Compensatory technique education; Energy conservation   Goal Expiration Date 05/07/21   OT Treatment Day 0   OT Frequency 3-5x/wk   Recommendation   OT Discharge Recommendation Home with home health rehabilitation   OT - OK to Discharge Yes  (Once medically cleared )   Additional Comments  The patient's raw score on the AM-PAC Daily Activity inpatient short form is 20, standardized score is 42 03, greater than 39 4  Patients at this level are likely to benefit from discharge to home  Please refer to the recommendation of the Occupational Therapist for safe discharge planning     AM-PAC Daily Activity Inpatient   Lower Body Dressing 3   Bathing 3   Toileting 3   Upper Body Dressing 3   Grooming 4   Eating 4   Daily Activity Raw Score 20   Daily Activity Standardized Score (Calc for Raw Score >=11) 42 03   AM-[facility] Applied Cognition Inpatient   Following a Speech/Presentation 4   Understanding Ordinary Conversation 4   Taking Medications 2   Remembering Where Things Are Placed or Put Away 2   Remembering List of 4-5 Errands 2   Taking Care of Complicated Tasks 3   Applied Cognition Raw Score 17   Applied Cognition Standardized Score 36 52     GOALS:    *ADL transfers with (I) for inc'd independence with ADLs/purposeful tasks    *UB ADL with (I) for inc'd independence with self cares    *LB ADL with (I) using AE prn for inc'd independence with self cares    *Toileting with (I) for clothing management and hygiene for return to PLOF with personal care    *Increase stand tolerance x5 m for inc'd tolerance with standing purposeful tasks    *Participate in 10m UE therex to increase overall stamina/activity tolerance for purposeful tasks    *Bed mobility- (I) for inc'd independence to manage own comfort and initiate EOB & OOB purposeful tasks    *Patient will verbalize 3 safety awareness/ principles to prevent falls in the home setting  *Patient will verbalize and demonstrate use of energy conservation/deep breathing techniques and work simplification skills during functional activities with no verbal cues  *Patient will increase OOB/sitting tolerance to 2-4 hours per day to increase participation in self-care and leisure tasks with no s/s of exertion  *Patient will engage in ongoing cognitive assessment to assist with safe discharge planning/recommendations        Ligia Jeffers MS, OTR/L

## 2021-04-27 NOTE — NURSING NOTE
LH IV removed(c/o b  "burning"); RAC IV removed(kinked and partially out); new 20 ga IV placed LAC  Blood return present; IV infusing without incident

## 2021-04-27 NOTE — PROGRESS NOTES
[address] [place] [address]    Cardiology Progress Note  Noah Hicks 82 y o  female   YOB: 1935 MRN: [id number]  Unit/Bed#: ICU 06 Encounter: [id number]      Subjective:   No current complaints of chest pain/palpitations, shortness of breath at rest   She does report shortness of breath with mild-to-moderate exertion  Her heart rate has continued to be elevated  Assessments  Principal Problem:    Atrial fibrillation with rapid ventricular response (HCC)  Active Problems:    Other emphysema (HCC)    Acute on chronic diastolic congestive heart failure (HCC)    Closed wedge compression fracture of T12 vertebra (HCC)    Chronic respiratory failure with hypoxia (HCC)    Type 2 diabetes mellitus without complication, without long-term current use of insulin (HCC)    Iron deficiency anemia    Plan  Paroxysmal Atrial Fibrillation with RVR  · Her heart rate continues to be elevated, although is somewhat better today  Heart rate overnight was in the 80s, but this morning is back in the 100s to 130s, with any sort of activity  · Continue IV amiodarone infusion at 0 5 mg  · Continue Cardizem 60 Q 6  · Increase metoprolol to 25 mg Q 8 H, and can use additional IV metoprolol 5 mg Q 6 p r n   For persistent heart rate greater than 130  · Adjusted hold parameters for medications to map less than 60 or SBP <90  · Continue eliquis for anticoagulation  · Check TSH/Free T4 and CMP while on amiodarone  · Plan to eventually switch to oral amiodarone therapy    Acute on chronic diastolic heart failure  · Initial BNP elevated at 476  · Received 2 doses of IV Lasix 40 with modest response  · I&O inaccurate  · Creatinine stable at 0 6  · SpO2 96-99% on 2-3 L nasal cannula oxygen  · Blood pressure borderline overnight  · Appears near euvolemic  · Home diuretic: lasix 20 bid  · Hold diuretic today, as BP remains low/borderline  · Resume lasix 40 daily tomorrow      Review of Systems   All other systems reviewed and are negative  Telemetry Review: atrial fibrillation with RVR  Heart rate continues to be elevated in the 100s to 150s this morning  Heart rate was down to 80s overnight    Objective:   Vitals: Blood pressure 90/60, pulse 97, temperature 98 °F (36 7 °C), temperature source Temporal, resp  rate 19, height 5' (1 524 m), weight 59 8 kg (131 lb 13 4 oz), SpO2 98 %, not currently breastfeeding , Body mass index is 25 75 kg/m² ,   Orthostatic Blood Pressures      Most Recent Value   Blood Pressure  90/60 filed at 04/27/2021 0600   Patient Position - Orthostatic VS  Lying filed at 04/27/2021 6884         Systolic (90IOQ), ALYSSA:60 , Min:73 , BQM:899     Diastolic (88NUQ), SXT:29, Min:45, Max:71    Wt Readings from Last 5 Encounters:   04/27/21 59 8 kg (131 lb 13 4 oz)   04/12/21 60 5 kg (133 lb 4 3 oz)   04/09/21 60 8 kg (134 lb)   04/07/21 61 kg (134 lb 7 7 oz)   03/02/21 64 3 kg (141 lb 12 8 oz)     I/O       04/24 0701 - 04/25 0700 04/25 0701 - 04/26 0700 04/26 0701 - 04/27 0700    P  O   160 180    I V  (mL/kg)  74 3 (1 2)     IV Piggyback  100     Total Intake(mL/kg)  334 3 (5 6) 180 (3)    Urine (mL/kg/hr)  100 (0 1)     Stool  0     Total Output  100     Net  +234 3 +180           Unmeasured Urine Occurrence  3 x     Unmeasured Stool Occurrence  1 x               Physical Exam  Vitals signs and nursing note reviewed  Constitutional:       General: She is not in acute distress  Appearance: Normal appearance  She is well-developed  She is not ill-appearing  HENT:      Head: Normocephalic and atraumatic  Nose: No congestion  Eyes:      General: No scleral icterus  Conjunctiva/sclera: Conjunctivae normal    Neck:      Musculoskeletal: Neck supple  Vascular: No carotid bruit or JVD  Cardiovascular:      Rate and Rhythm: Tachycardia present  Rhythm irregular  Pulses: Normal pulses  Heart sounds: Normal heart sounds  No murmur  No friction rub  No gallop      Pulmonary:      Effort: Pulmonary effort is normal  No respiratory distress  Breath sounds: Normal breath sounds  No rales  Abdominal:      General: There is no distension  Palpations: Abdomen is soft  Tenderness: There is no abdominal tenderness  Musculoskeletal:         General: No swelling or tenderness  Right lower leg: No edema  Left lower leg: No edema  Skin:     General: Skin is warm  Neurological:      General: No focal deficit present  Mental Status: She is alert and oriented to person, place, and time  Mental status is at baseline  Psychiatric:         Mood and Affect: Mood normal          Behavior: Behavior normal          Thought Content:  Thought content normal            Laboratory Results: personally reviewed  Results from last 7 days   Lab Units 04/25/21  1322 04/25/21  1030 04/25/21  0702   TROPONIN I ng/mL 0 03 0 03 0 03       CBC with diff:   Results from last 7 days   Lab Units 04/27/21  0523 04/26/21  0526 04/25/21  0702   WBC Thousand/uL 9 50 8 40 8 30   HEMOGLOBIN g/dL 8 7* 10 1* 10 6*   HEMATOCRIT % 28 1* 32 3* 34 1*   MCV fL 72* 73* 72*   PLATELETS Thousands/uL 390 403* 376   MCH pg 22 2* 22 7* 22 5*   MCHC g/dL 30 8* 31 2 31 3   RDW % 20 2* 19 5* 19 9*   MPV fL 7 6* 8 0* 7 3*         CMP:  Results from last 7 days   Lab Units 04/27/21  0523 04/26/21  0526 04/25/21  0702   POTASSIUM mmol/L 3 9 3 9 3 0*   CHLORIDE mmol/L 104 101 97*   CO2 mmol/L 27 28 33*   BUN mg/dL 10 8 7   CREATININE mg/dL 0 63 0 62 0 68   CALCIUM mg/dL 7 5* 8 1* 8 5*   EGFR ml/min/1 73sq m 83 84 81         BMP:  Results from last 7 days   Lab Units 04/27/21  0523 04/26/21  0526 04/25/21  0702   POTASSIUM mmol/L 3 9 3 9 3 0*   CHLORIDE mmol/L 104 101 97*   CO2 mmol/L 27 28 33*   BUN mg/dL 10 8 7   CREATININE mg/dL 0 63 0 62 0 68   CALCIUM mg/dL 7 5* 8 1* 8 5*       BNP:   Recent Labs     04/25/21  0702   *       Magnesium:       Coags:   Results from last 7 days   Lab Units 04/25/21  0702   INR  1 10 TSH:        Hemoglobin A1C       Lipid Profile:       Meds/Allergies   all current active meds have been reviewed and current meds:   Current Facility-Administered Medications   Medication Dose Route Frequency    acetaminophen (TYLENOL) tablet 650 mg  650 mg Oral Q4H PRN    albuterol inhalation solution 2 5 mg  2 5 mg Nebulization Q4H PRN    [START ON 5/1/2021] alendronate (FOSAMAX) tablet 70 mg  70 mg Oral Weekly Before Breakfast    amiodarone (CORDARONE) 900 mg in dextrose 5 % 500 mL infusion  0 5 mg/min Intravenous Continuous    apixaban (ELIQUIS) tablet 2 5 mg  2 5 mg Oral BID    budesonide-formoterol (SYMBICORT) 160-4 5 mcg/act inhaler 2 puff  2 puff Inhalation BID    calcium carbonate-vitamin D (OSCAL-D) 500 mg-200 units per tablet 1 tablet  1 tablet Oral Daily With Breakfast    diltiazem (CARDIZEM) 125 mg in sodium chloride 0 9 % 125 mL infusion  1-15 mg/hr Intravenous Titrated    diltiazem (CARDIZEM) tablet 60 mg  60 mg Oral Q6H [facility]    gabapentin (NEURONTIN) capsule 300 mg  300 mg Oral TID    glycerin-hypromellose- (ARTIFICIAL TEARS) ophthalmic solution 2 drop  2 drop Both Eyes Q3H PRN    HYDROcodone-acetaminophen (NORCO) 5-325 mg per tablet 1 tablet  1 tablet Oral Q6H PRN    hydrOXYzine HCL (ATARAX) tablet 25 mg  25 mg Oral Q6H PRN    insulin glargine (LANTUS) subcutaneous injection 10 Units 0 1 mL  10 Units Subcutaneous HS    insulin lispro (HumaLOG) 100 units/mL subcutaneous injection 1-5 Units  1-5 Units Subcutaneous TID AC    insulin lispro (HumaLOG) 100 units/mL subcutaneous injection 1-5 Units  1-5 Units Subcutaneous HS    iron polysaccharides (FERREX) capsule 150 mg  150 mg Oral Daily    levalbuterol (XOPENEX) inhalation solution 1 25 mg  1 25 mg Nebulization TID    And    sodium chloride 0 9 % inhalation solution 3 mL  3 mL Nebulization TID    lidocaine (LIDODERM) 5 % patch 1 patch  1 patch Topical Daily    magnesium oxide (MAG-OX) tablet 400 mg  400 mg Oral BID    menthol-methyl salicylate (BENGAY) 46-26 % cream   Apply externally 4x Daily PRN    methocarbamol (ROBAXIN) tablet 500 mg  500 mg Oral Q8H [facility]    metoprolol (LOPRESSOR) injection 5 mg  5 mg Intravenous Q6H PRN    metoprolol tartrate (LOPRESSOR) partial tablet 12 5 mg  12 5 mg Oral Q12H    montelukast (SINGULAIR) tablet 10 mg  10 mg Oral HS    pantoprazole (PROTONIX) EC tablet 40 mg  40 mg Oral BID    polyethylene glycol (MIRALAX) packet 17 g  17 g Oral Daily    potassium chloride (K-DUR,KLOR-CON) CR tablet 40 mEq  40 mEq Oral Daily    predniSONE tablet 5 mg  5 mg Oral BID With Meals    simethicone (MYLICON) chewable tablet 80 mg  80 mg Oral Q6H PRN    theophylline (CORTNEY-24) 24 hr capsule 200 mg  200 mg Oral Daily    tiotropium (SPIRIVA) capsule for inhaler 18 mcg  18 mcg Inhalation Daily     Medications Prior to Admission   Medication    acetaminophen (TYLENOL) 325 mg tablet    alendronate (FOSAMAX) 70 mg tablet    apixaban (ELIQUIS) 5 mg    Artificial Tear Solution (GENTEAL TEARS) 0 1-0 2-0 3 % SOLN    budesonide-formoterol (Symbicort) 160-4 5 mcg/act inhaler    Calcium Carb-Cholecalciferol (CALCIUM 1000 + D PO)    cholecalciferol (VITAMIN D3) 400 units tablet    fluticasone-umeclidinium-vilanterol (Trelegy Ellipta) 100-62 5-25 MCG/INH inhaler    furosemide (LASIX) 20 mg tablet    gabapentin (NEURONTIN) 300 mg capsule    HYDROcodone-acetaminophen (NORCO) 5-325 mg per tablet    hydrOXYzine HCL (ATARAX) 25 mg tablet    ipratropium-albuterol (DUO-NEB) 0 5-2 5 mg/3 mL nebulizer solution    iron polysaccharides (Ferrex 150) 150 mg capsule    lidocaine (LIDODERM) 5 %    magnesium oxide (MAG-OX) 400 mg    menthol-methyl salicylate (BENGAY) 66-27 % cream    metFORMIN (GLUCOPHAGE) 500 mg tablet    methocarbamol (ROBAXIN) 500 mg tablet    metoprolol tartrate (LOPRESSOR) 25 mg tablet    montelukast (SINGULAIR) 10 mg tablet    OXYGEN-HELIUM IN    pantoprazole (PROTONIX) 40 mg tablet    potassium chloride (K-DUR,KLOR-CON) 20 mEq tablet    predniSONE 5 mg tablet    psyllium (METAMUCIL) packet    simethicone (MYLICON) 80 mg chewable tablet    sitaGLIPtin (JANUVIA) 50 mg tablet    theophylline (CORTNEY-24) 200 MG 24 hr capsule    polyethylene glycol (MIRALAX) 17 g packet     amiodarone, 0 5 mg/min, Last Rate: 0 5 mg/min (21 0400)  diltiazem, 1-15 mg/hr, Last Rate: Stopped (21 2030)          Cardiac testing: reviewed  Results for orders placed during the hospital encounter of 21   Echo complete with contrast if indicated    Narrative [address] [place] [address]    Transthoracic Echocardiogram  2D, M-mode, Doppler, and Color Doppler    Study date:  2021    Patient: Eva King  MR number: [id number]  Account number: [de-identified]  : 1935  Age: 82 years  Gender: Female  Status: Outpatient  Location: [facility]  Height: 60 in  Weight: 144 lb  BP: 116/ 78 mmHg    Indications: CHF  Diagnoses: I50 9 - Heart failure, unspecified    Sonographer:  Kirit Harper RDCS  Referring Physician:  LEEROY Gee  Group:  [facility]  Interpreting Physician:  Unique Marie MD    SUMMARY    LEFT VENTRICLE:  Systolic function was normal  Ejection fraction was estimated to be 60 %  There were no regional wall motion abnormalities  There was mild concentric hypertrophy  Doppler parameters were consistent with abnormal left ventricular relaxation (grade 1 diastolic dysfunction)  LEFT ATRIUM:  The atrium was dilated  MITRAL VALVE:  There was moderate annular calcification  AORTIC VALVE:  The valve was trileaflet  Leaflets exhibited moderate calcification and normal cuspal separation  There was mild to moderate regurgitation  AORTA:  There was mild dilatation of the ascending aorta to 3 9 cm  HISTORY: Dyspnea  PRIOR HISTORY: Congestive heart failure  Atrial fibrillation  Chronic lung disease  PROCEDURE: The study was performed in the [facility]  This was a routine study  The transthoracic approach was used  The study included complete 2D imaging, M-mode, complete spectral Doppler, and color Doppler  The  heart rate was 117 bpm, at the start of the study  Images were obtained from the parasternal, apical, subcostal, and suprasternal notch acoustic windows  Echocardiographic views were limited due to restricted patient mobility, poor  acoustic window availability, and lung interference  This was a technically difficult study  LEFT VENTRICLE: Size was normal  Systolic function was normal  Ejection fraction was estimated to be 60 %  There were no regional wall motion abnormalities  Wall thickness was normal  There was mild concentric hypertrophy  No evidence of  apical thrombus  DOPPLER: Doppler parameters were consistent with abnormal left ventricular relaxation (grade 1 diastolic dysfunction)  RIGHT VENTRICLE: The size was normal  Systolic function was normal  Wall thickness was normal     LEFT ATRIUM: The atrium was dilated  RIGHT ATRIUM: Size was normal     MITRAL VALVE: There was moderate annular calcification  Valve structure was normal  There was normal leaflet separation  DOPPLER: The transmitral velocity was within the normal range  There was no evidence for stenosis  There was no  significant regurgitation  AORTIC VALVE: The valve was trileaflet  Leaflets exhibited moderate calcification and normal cuspal separation  DOPPLER: Transaortic velocity was within the normal range  There was no evidence for stenosis  There was mild to moderate  regurgitation  TRICUSPID VALVE: The valve structure was normal  There was normal leaflet separation  DOPPLER: The transtricuspid velocity was within the normal range  There was no evidence for stenosis  There was no significant regurgitation      PULMONIC VALVE: Leaflets exhibited normal thickness, no calcification, and normal cuspal separation  DOPPLER: The transpulmonic velocity was within the normal range  There was no significant regurgitation  PERICARDIUM: There was no pericardial effusion  The pericardium was normal in appearance  AORTA: The root exhibited normal size  There was mild dilatation of the ascending aorta to 3 9 cm  SYSTEMIC VEINS: IVC: The inferior vena cava was normal in size  SYSTEM MEASUREMENT TABLES    2D  %FS: 27 91 %  Ao Diam: 3 4 cm  Ao asc: 3 89 cm  EDV(Teich): 83 81 ml  EF(Teich): 54 34 %  ESV(Teich): 38 27 ml  IVSd: 1 19 cm  LA Area: 15 85 cm2  LA Diam: 2 91 cm  LVEDV MOD A4C: 81 33 ml  LVEF MOD A4C: 50 07 %  LVESV MOD A4C: 40 61 ml  LVIDd: 4 32 cm  LVIDs: 3 11 cm  LVLd A4C: 6 23 cm  LVLs A4C: 5 45 cm  LVOT Diam: 2 cm  LVPWd: 1 06 cm  RA Area: 12 39 cm2  RVIDd: 2 31 cm  SV MOD A4C: 40 72 ml  SV(Teich): 45 54 ml    CW  AR Dec [place]: 1 18 m/s2  AR Dec Time: 3103 32 ms  AR PHT: 899 96 ms  AR Vmax: 3 67 m/s  AR maxP 86 mmHg  AV Env  Ti: 230 89 ms  AV VTI: 20 79 cm  AV Vmax: 1 24 m/s  AV Vmean: 0 9 m/s  AV maxP 19 mmHg  AV meanPG: 3 51 mmHg  MV PHT: 64 68 ms  MV VTI: 38 89 cm  MV Vmax: 1 31 m/s  MV Vmean: 0 84 m/s  MV maxP 9 mmHg  MV meanPG: 3 11 mmHg  MVA By PHT: 3 4 cm2  TR Vmax: 2 78 m/s  TR maxP 87 mmHg    MM  TAPSE: 1 24 cm    PW  BONY (VTI): 2 5 cm2  BONY Vmax: 2 16 cm2  LVOT Env  Ti: 304 47 ms  LVOT VTI: 16 63 cm  LVOT Vmax: 0 86 m/s  LVOT Vmean: 0 55 m/s  LVOT maxP 95 mmHg  LVOT meanP 36 mmHg  LVSV Dopp: 51 96 ml  MVA (VTI): 1 34 cm2    [employer] Accredited Echocardiography Laboratory    Prepared and electronically signed by    Homer Manley MD  Signed [id number] 16:26:39       No results found for this or any previous visit  No results found for this or any previous visit  No results found for this or any previous visit

## 2021-04-28 PROBLEM — E87.1 HYPONATREMIA: Status: ACTIVE | Noted: 2021-01-01

## 2021-04-28 NOTE — PROGRESS NOTES
[address]    Cardiology Progress Note  Nneka Whitehead 82 y o  female   YOB: 1935 MRN: [id number]  Unit/Bed#: ICU 06 Encounter: [id number]      Subjective:   No complains of shortness of breath or orthopnea  Blood pressure continues to be on the lower side, particularly overnight, but then heart rate is better controlled    No complains of chest pain or palpitations    Assessments  Principal Problem:    Atrial fibrillation with rapid ventricular response (HCC)  Active Problems:    Other emphysema (HCC)    Acute on chronic diastolic congestive heart failure (HCC)    Closed wedge compression fracture of T12 vertebra (HCC)    Chronic respiratory failure with hypoxia (HCC)    Type 2 diabetes mellitus without complication, without long-term current use of insulin (HCC)    Iron deficiency anemia    Hyponatremia    Plan  Paroxysmal Atrial Fibrillation with RVR, frequent PACs  · Heart rate overnight was in the 80s, but this morning is back in the 100s to 130s, with any sort of activity  · Currently appears to be in sinus rhythm with very frequent PACs on telemetry  · Switch amiodarone infusion to PO amiodarone 200 mg bid  · She appears to be responding better to metoprolol, and will try to uptitrate that instead of the cardizem  · Bp remains borderline in 90s, MAP 60-70  · Increase metoprolol to 25 mg Q 6 H, and use additional IV metoprolol 5 mg Q 6 p r n  for persistent heart rate greater than 130  · Decrease cardizem to 30q6 to allow uptitration of metoprolol  · Continue eliquis for anticoagulation  · TSH, AST/ALT normal    Chronic diastolic heart failure  · Acute exacerbation is resolved  · Initial BNP elevated at 476  · Received 2 doses of IV Lasix 40 with modest response  · SpO2 96-99% on 2-3 L nasal cannula oxygen, which is her recent baseline  · Blood pressure continues to be borderline overnight  · Home diuretic: lasix 20 bid  · Na down to 131, does not appear to be grossly volume overloaded - ?2/2 polydipsia  · Resume lasix 20 daily with hold parameters    Discussed with primary service Dr Rosa Elena Dow    Review of Systems   All other systems reviewed and are negative  Telemetry Review: NSR with frequent PACs  HR in 80-100s overnight, and then back to 120-150s this morning  Objective:   Vitals: Blood pressure 94/53, pulse (!) 145, temperature (!) 96 7 °F (35 9 °C), temperature source Tympanic, resp  rate (!) 28, height 5' (1 524 m), weight 59 8 kg (131 lb 13 4 oz), SpO2 100 %, not currently breastfeeding , Body mass index is 25 75 kg/m² ,   Orthostatic Blood Pressures      Most Recent Value   Blood Pressure  94/53 filed at 04/28/2021 0900   Patient Position - Orthostatic VS  Lying filed at 04/28/2021 9201         Systolic (71FGN), UBI:00 , Min:74 , ONP:258     Diastolic (87TPS), BENITA:28, Min:46, Max:79    Wt Readings from Last 5 Encounters:   04/28/21 59 8 kg (131 lb 13 4 oz)   04/12/21 60 5 kg (133 lb 4 3 oz)   04/09/21 60 8 kg (134 lb)   04/07/21 61 kg (134 lb 7 7 oz)   03/02/21 64 3 kg (141 lb 12 8 oz)     I/O       04/24 0701 - 04/25 0700 04/25 0701 - 04/26 0700 04/26 0701 - 04/27 0700    P  O   160 180    I V  (mL/kg)  74 3 (1 2)     IV Piggyback  100     Total Intake(mL/kg)  334 3 (5 6) 180 (3)    Urine (mL/kg/hr)  100 (0 1)     Stool  0     Total Output  100     Net  +234 3 +180           Unmeasured Urine Occurrence  3 x     Unmeasured Stool Occurrence  1 x               Physical Exam  Vitals signs and nursing note reviewed  Constitutional:       General: She is not in acute distress  Appearance: Normal appearance  She is well-developed  She is not ill-appearing  HENT:      Head: Normocephalic and atraumatic  Nose: No congestion  Eyes:      General: No scleral icterus  Conjunctiva/sclera: Conjunctivae normal    Neck:      Musculoskeletal: Neck supple  Vascular: No carotid bruit or JVD  Cardiovascular:      Rate and Rhythm: Tachycardia present   Rhythm irregular  Frequent extrasystoles are present  Pulses: Normal pulses  Heart sounds: Normal heart sounds  No murmur  No friction rub  No gallop  Pulmonary:      Effort: Pulmonary effort is normal  No respiratory distress  Breath sounds: Normal breath sounds  No rales  Abdominal:      General: There is no distension  Palpations: Abdomen is soft  Tenderness: There is no abdominal tenderness  Musculoskeletal:         General: No swelling or tenderness  Right lower leg: No edema  Left lower leg: No edema  Skin:     General: Skin is warm  Neurological:      General: No focal deficit present  Mental Status: She is alert and oriented to person, place, and time  Mental status is at baseline  Psychiatric:         Mood and Affect: Mood normal          Behavior: Behavior normal          Thought Content:  Thought content normal            Laboratory Results: personally reviewed  Results from last 7 days   Lab Units 04/25/21  1322 04/25/21  1030 04/25/21  0702   TROPONIN I ng/mL 0 03 0 03 0 03       CBC with diff:   Results from last 7 days   Lab Units 04/28/21  0510 04/27/21  0523 04/26/21  0526 04/25/21  0702   WBC Thousand/uL 9 30 9 50 8 40 8 30   HEMOGLOBIN g/dL 8 7* 8 7* 10 1* 10 6*   HEMATOCRIT % 28 4* 28 1* 32 3* 34 1*   MCV fL 73* 72* 73* 72*   PLATELETS Thousands/uL 339 390 403* 376   MCH pg 22 4* 22 2* 22 7* 22 5*   MCHC g/dL 30 8* 30 8* 31 2 31 3   RDW % 20 0* 20 2* 19 5* 19 9*   MPV fL 7 5* 7 6* 8 0* 7 3*         CMP:  Results from last 7 days   Lab Units 04/28/21  0510 04/27/21  0523 04/26/21  0526 04/25/21  0702   POTASSIUM mmol/L 4 2 3 9 3 9 3 0*   CHLORIDE mmol/L 102 104 101 97*   CO2 mmol/L 24 27 28 33*   BUN mg/dL 9 10 8 7   CREATININE mg/dL 0 54* 0 63 0 62 0 68   CALCIUM mg/dL 7 4* 7 5* 8 1* 8 5*   AST U/L 10*  --   --   --    ALT U/L 11  --   --   --    ALK PHOS U/L 52*  --   --   --    EGFR ml/min/1 73sq m 88 83 84 81         BMP:  Results from last 7 days Lab Units 04/28/21  0510 04/27/21  0523 04/26/21  0526 04/25/21  0702   POTASSIUM mmol/L 4 2 3 9 3 9 3 0*   CHLORIDE mmol/L 102 104 101 97*   CO2 mmol/L 24 27 28 33*   BUN mg/dL 9 10 8 7   CREATININE mg/dL 0 54* 0 63 0 62 0 68   CALCIUM mg/dL 7 4* 7 5* 8 1* 8 5*       BNP:   No results for input(s): BNP in the last 72 hours      Magnesium:   Results from last 7 days   Lab Units 04/28/21  0510   MAGNESIUM mg/dL 2 4       Coags:   Results from last 7 days   Lab Units 04/25/21  0702   INR  1 10       TSH:        Hemoglobin A1C       Lipid Profile:       Meds/Allergies   all current active meds have been reviewed and current meds:   Current Facility-Administered Medications   Medication Dose Route Frequency    acetaminophen (TYLENOL) tablet 650 mg  650 mg Oral Q4H PRN    albuterol inhalation solution 2 5 mg  2 5 mg Nebulization Q4H PRN    [START ON 5/1/2021] alendronate (FOSAMAX) tablet 70 mg  70 mg Oral Weekly Before Breakfast    amiodarone tablet 200 mg  200 mg Oral BID With Meals    Followed by   Zoey Pisano ON 5/8/2021] amiodarone tablet 200 mg  200 mg Oral Daily With Breakfast    apixaban (ELIQUIS) tablet 2 5 mg  2 5 mg Oral BID    budesonide-formoterol (SYMBICORT) 160-4 5 mcg/act inhaler 2 puff  2 puff Inhalation BID    calcium carbonate-vitamin D (OSCAL-D) 500 mg-200 units per tablet 1 tablet  1 tablet Oral Daily With Breakfast    diltiazem (CARDIZEM) tablet 30 mg  30 mg Oral Q6H [facility]    gabapentin (NEURONTIN) capsule 300 mg  300 mg Oral TID    glycerin-hypromellose- (ARTIFICIAL TEARS) ophthalmic solution 2 drop  2 drop Both Eyes Q3H PRN    HYDROcodone-acetaminophen (NORCO) 5-325 mg per tablet 1 tablet  1 tablet Oral Q6H PRN    hydrOXYzine HCL (ATARAX) tablet 25 mg  25 mg Oral Q6H PRN    insulin glargine (LANTUS) subcutaneous injection 10 Units 0 1 mL  10 Units Subcutaneous Q12H [facility]    insulin lispro (HumaLOG) 100 units/mL subcutaneous injection 1-5 Units  1-5 Units Subcutaneous TID AC    insulin lispro (HumaLOG) 100 units/mL subcutaneous injection 1-5 Units  1-5 Units Subcutaneous HS    iron polysaccharides (FERREX) capsule 150 mg  150 mg Oral Daily    levalbuterol (XOPENEX) inhalation solution 1 25 mg  1 25 mg Nebulization TID    And    sodium chloride 0 9 % inhalation solution 3 mL  3 mL Nebulization TID    lidocaine (LIDODERM) 5 % patch 1 patch  1 patch Topical Daily    magnesium oxide (MAG-OX) tablet 400 mg  400 mg Oral BID    menthol-methyl salicylate (BENGAY) 71-08 % cream   Apply externally 4x Daily PRN    methocarbamol (ROBAXIN) tablet 500 mg  500 mg Oral Q8H [address]    metoprolol (LOPRESSOR) injection 5 mg  5 mg Intravenous Q6H PRN    metoprolol tartrate (LOPRESSOR) tablet 25 mg  25 mg Oral Q6H    montelukast (SINGULAIR) tablet 10 mg  10 mg Oral HS    pantoprazole (PROTONIX) EC tablet 40 mg  40 mg Oral BID    polyethylene glycol (MIRALAX) packet 17 g  17 g Oral Daily    potassium chloride (K-DUR,KLOR-CON) CR tablet 40 mEq  40 mEq Oral Daily    predniSONE tablet 5 mg  5 mg Oral BID With Meals    simethicone (MYLICON) chewable tablet 80 mg  80 mg Oral Q6H PRN    sodium phosphate 21 mmol in dextrose 5 % 250 mL Infusion  21 mmol Intravenous Once    theophylline (CORTNEY-24) 24 hr capsule 200 mg  200 mg Oral Daily    tiotropium (SPIRIVA) capsule for inhaler 18 mcg  18 mcg Inhalation Daily     Medications Prior to Admission   Medication    acetaminophen (TYLENOL) 325 mg tablet    alendronate (FOSAMAX) 70 mg tablet    apixaban (ELIQUIS) 5 mg    Artificial Tear Solution (GENTEAL TEARS) 0 1-0 2-0 3 % SOLN    budesonide-formoterol (Symbicort) 160-4 5 mcg/act inhaler    Calcium Carb-Cholecalciferol (CALCIUM 1000 + D PO)    cholecalciferol (VITAMIN D3) 400 units tablet    fluticasone-umeclidinium-vilanterol (Trelegy Ellipta) 100-62 5-25 MCG/INH inhaler    furosemide (LASIX) 20 mg tablet    gabapentin (NEURONTIN) 300 mg capsule    HYDROcodone-acetaminophen (NORCO) 5-325 mg per tablet    hydrOXYzine HCL (ATARAX) 25 mg tablet    ipratropium-albuterol (DUO-NEB) 0 5-2 5 mg/3 mL nebulizer solution    iron polysaccharides (Ferrex 150) 150 mg capsule    lidocaine (LIDODERM) 5 %    magnesium oxide (MAG-OX) 400 mg    menthol-methyl salicylate (BENGAY) 88-26 % cream    metFORMIN (GLUCOPHAGE) 500 mg tablet    methocarbamol (ROBAXIN) 500 mg tablet    metoprolol tartrate (LOPRESSOR) 25 mg tablet    montelukast (SINGULAIR) 10 mg tablet    OXYGEN-HELIUM IN    pantoprazole (PROTONIX) 40 mg tablet    potassium chloride (K-DUR,KLOR-CON) 20 mEq tablet    predniSONE 5 mg tablet    psyllium (METAMUCIL) packet    simethicone (MYLICON) 80 mg chewable tablet    sitaGLIPtin (JANUVIA) 50 mg tablet    theophylline (CORTNEY-24) 200 MG 24 hr capsule    polyethylene glycol (MIRALAX) 17 g packet            Cardiac testing: reviewed  Results for orders placed during the hospital encounter of 21   Echo complete with contrast if indicated    [place] [id number]    Transthoracic Echocardiogram  2D, M-mode, Doppler, and Color Doppler    Study date:  2021    Patient: Raghav Garland  MR number: [id number]  Account number: [de-identified]  : 1935  Age: 82 years  Gender: Female  Status: Outpatient  Location: [facility]  Height: 60 in  Weight: 144 lb  BP: 116/ 78 mmHg    Indications: CHF  Diagnoses: I50 9 - Heart failure, unspecified    Sonographer:  Reyna Nath RDCS  Referring Physician:  LEEROY Canales  Group:  [facility]  Interpreting Physician:  Caesar Knapp MD    SUMMARY    LEFT VENTRICLE:  Systolic function was normal  Ejection fraction was estimated to be 60 %  There were no regional wall motion abnormalities  There was mild concentric hypertrophy    Doppler parameters were consistent with abnormal left ventricular relaxation (grade 1 diastolic dysfunction)  LEFT ATRIUM:  The atrium was dilated  MITRAL VALVE:  There was moderate annular calcification  AORTIC VALVE:  The valve was trileaflet  Leaflets exhibited moderate calcification and normal cuspal separation  There was mild to moderate regurgitation  AORTA:  There was mild dilatation of the ascending aorta to 3 9 cm  HISTORY: Dyspnea  PRIOR HISTORY: Congestive heart failure  Atrial fibrillation  Chronic lung disease  PROCEDURE: The study was performed in the [facility] [place]  This was a routine study  The transthoracic approach was used  The study included complete 2D imaging, M-mode, complete spectral Doppler, and color Doppler  The  heart rate was 117 bpm, at the start of the study  Images were obtained from the parasternal, apical, subcostal, and suprasternal notch acoustic windows  Echocardiographic views were limited due to restricted patient mobility, poor  acoustic window availability, and lung interference  This was a technically difficult study  LEFT VENTRICLE: Size was normal  Systolic function was normal  Ejection fraction was estimated to be 60 %  There were no regional wall motion abnormalities  Wall thickness was normal  There was mild concentric hypertrophy  No evidence of  apical thrombus  DOPPLER: Doppler parameters were consistent with abnormal left ventricular relaxation (grade 1 diastolic dysfunction)  RIGHT VENTRICLE: The size was normal  Systolic function was normal  Wall thickness was normal     LEFT ATRIUM: The atrium was dilated  RIGHT ATRIUM: Size was normal     MITRAL VALVE: There was moderate annular calcification  Valve structure was normal  There was normal leaflet separation  DOPPLER: The transmitral velocity was within the normal range  There was no evidence for stenosis  There was no  significant regurgitation  AORTIC VALVE: The valve was trileaflet  Leaflets exhibited moderate calcification and normal cuspal separation  DOPPLER: Transaortic velocity was within the normal range  There was no evidence for stenosis  There was mild to moderate  regurgitation  TRICUSPID VALVE: The valve structure was normal  There was normal leaflet separation  DOPPLER: The transtricuspid velocity was within the normal range  There was no evidence for stenosis  There was no significant regurgitation  PULMONIC VALVE: Leaflets exhibited normal thickness, no calcification, and normal cuspal separation  DOPPLER: The transpulmonic velocity was within the normal range  There was no significant regurgitation  PERICARDIUM: There was no pericardial effusion  The pericardium was normal in appearance  AORTA: The root exhibited normal size  There was mild dilatation of the ascending aorta to 3 9 cm  SYSTEMIC VEINS: IVC: The inferior vena cava was normal in size  SYSTEM MEASUREMENT TABLES    2D  %FS: 27 91 %  Ao Diam: 3 4 cm  Ao asc: 3 89 cm  EDV(Teich): 83 81 ml  EF(Teich): 54 34 %  ESV(Teich): 38 27 ml  IVSd: 1 19 cm  LA Area: 15 85 cm2  LA Diam: 2 91 cm  LVEDV MOD A4C: 81 33 ml  LVEF MOD A4C: 50 07 %  LVESV MOD A4C: 40 61 ml  LVIDd: 4 32 cm  LVIDs: 3 11 cm  LVLd A4C: 6 23 cm  LVLs A4C: 5 45 cm  LVOT Diam: 2 cm  LVPWd: 1 06 cm  RA Area: 12 39 cm2  RVIDd: 2 31 cm  SV MOD A4C: 40 72 ml  SV(Teich): 45 54 ml    CW  AR Dec Gillespie: 1 18 m/s2  AR Dec Time: 3103 32 ms  AR PHT: 899 96 ms  AR Vmax: 3 67 m/s  AR maxP 86 mmHg  AV Env  Ti: 230 89 ms  AV VTI: 20 79 cm  AV Vmax: 1 24 m/s  AV Vmean: 0 9 m/s  AV maxP 19 mmHg  AV meanPG: 3 51 mmHg  MV PHT: 64 68 ms  MV VTI: 38 89 cm  MV Vmax: 1 31 m/s  MV Vmean: 0 84 m/s  MV maxP 9 mmHg  MV meanPG: 3 11 mmHg  MVA By PHT: 3 4 cm2  TR Vmax: 2 78 m/s  TR maxP 87 mmHg    MM  TAPSE: 1 24 cm    PW  BONY (VTI): 2 5 cm2  BONY Vmax: 2 16 cm2  LVOT Env  Ti: 304 47 ms  LVOT VTI: 16 63 cm  LVOT Vmax: 0 86 m/s  LVOT Vmean: 0 55 m/s  LVOT maxP 95 mmHg  LVOT meanP 36 mmHg  LVSV Dopp: 51 96 ml  MVA (VTI): 1 34 cm2    [facility] Accredited Echocardi[facility]    Prepared and electronically signed by    Caesar Knapp MD  Signed [id number] 16:26:39       No results found for this or any previous visit  No results found for this or any previous visit  No results found for this or any previous visit

## 2021-04-28 NOTE — ASSESSMENT & PLAN NOTE
· Mild drop in H&H noted-patient otherwise remains stable  · H&H has remained stable for now  · Continue iron supplementation

## 2021-04-28 NOTE — PLAN OF CARE
Problem: PHYSICAL THERAPY ADULT  Goal: Performs mobility at highest level of function for planned discharge setting  See evaluation for individualized goals  Description: Treatment/Interventions: Functional transfer training, LE strengthening/ROM, Therapeutic exercise, Endurance training, Patient/family training, Equipment eval/education, Gait training, Compensatory technique education, Spoke to nursing, OT  Equipment Recommended: Walker(pt  has own)       See flowsheet documentation for full assessment, interventions and recommendations  Outcome: Progressing  Note: Prognosis: Good  Problem List: Decreased strength, Decreased mobility, Impaired balance, Impaired vision, Decreased endurance, Impaired hearing  Assessment: Pt seen for PT treatment session this date with interventions consisting of gait training w/ emphasis on improving pt's ability to ambulate level surfaces x 100 feet total with close S provided by therapist with RW and Therapeutic exercise consisting of: AROM 30 reps B LE in sitting position  Pt agreeable to PT treatment session upon arrival, pt found supine in bed w/ HOB elevated, A&O x 4  In comparison to previous session, pt with improvements in ambulatory distance  Post session: all needs in reach and RN notified of session findings/recommendations  Continue to recommend home with home health rehabilitation at time of d/c in order to maximize pt's functional independence and safety w/ mobility  Pt continues to be functioning below baseline level, and remains limited 2* factors listed above and including decreased endurance, gait deviations  PT will continue to see pt during current hospitalization in order to address the deficits listed above and provide interventions consistent w/ POC in effort to achieve LTGs             PT Discharge Recommendation: Home with home health rehabilitation     PT - OK to Discharge: Yes(when medically stable, from functional perspective)    See flowsheet documentation for full assessment

## 2021-04-28 NOTE — ASSESSMENT & PLAN NOTE
Lab Results   Component Value Date    HGBA1C 12 6 (A) 04/09/2021       Recent Labs     04/27/21  1027 04/27/21  1547 04/27/21 2046 04/28/21  0551   POCGLU 190* 250* 142* 111       Blood Sugar Average: Last 72 hrs:  · (P) 567 [id number]   · Oral hypoglycemics remain on hold  · Continue Lantus for basal coverage while here in the hospital - Will increase Lantus dosing to b i d   · Continue Accu-Cheks AC and HS with sliding scale coverage  · Continue gabapentin for diabetic neuropathy

## 2021-04-28 NOTE — ASSESSMENT & PLAN NOTE
· Patient remains in a state of AFib with RVR  · Patient remains on amiodarone drip-continue the same, patient has had low blood pressures  · Cardiology is following  · Current rate-controlling regimen includes 1  An amiodarone drip 2  Diltiazem 60 mg p o  Q6H and 3   Metoprolol tartrate 25 mg p o  Q 8 hours-this was increased from 12 5 mg twice a day  · Continue apixaban 2 5 mg p o  B i d  For anticoagulation purposes  · Continued management as per Cardiology  · Continue present management here in the step-down unit  · Discharge planning once cleared by Cardiology

## 2021-04-28 NOTE — ASSESSMENT & PLAN NOTE
· Most likely a hypervolemic hyponatremia - monitor sodium closely with Lasix re-initiation by Cardiology  · Hypophosphatemia - replete and recheck

## 2021-04-28 NOTE — PROGRESS NOTES
[facility]  Progress Note - Gianfranco Tam 1935, 82 y o  female MRN: [id number]  Unit/Bed#: ICU 06 Encounter: [id number]  Primary Care Provider: LEEROY Alicea   Date and time admitted to hospital: 4/25/2021  6:51 AM    * Atrial fibrillation with rapid ventricular response ([address] )  Assessment & Plan  · Patient remains in a state of AFib with RVR  · Patient remains on amiodarone drip-continue the same, patient has had low blood pressures  · Cardiology is following  · Current rate-controlling regimen includes 1  An amiodarone drip 2  Diltiazem 60 mg p o  Q6H and 3  Metoprolol tartrate 25 mg p o  Q 8 hours-this was increased from 12 5 mg twice a day  · Continue apixaban 2 5 mg p o  B i d  For anticoagulation purposes  · Continued management as per Cardiology  · Continue present management here in the step-down unit  · Discharge planning once cleared by Cardiology    Acute on chronic diastolic congestive heart failure (HCC)  Assessment & Plan  Wt Readings from Last 3 Encounters:   04/28/21 59 8 kg (131 lb 13 4 oz)   04/12/21 60 5 kg (133 lb 4 3 oz)   04/09/21 60 8 kg (134 lb)     · Suspected mild exacerbation in the setting of AFib with RVR  · Elevated BNP noted   · Last echocardiogram was obtained on 02/22/2021 which shows LVEF of 60%  Grade 1 diastolic dysfunction  · Cardiology input appreciated   · Status post treatment with 2 doses of IV Lasix  · Defer further Lasix therapy to Cardiology    Other emphysema [facility])  Assessment & Plan  · Without acute exacerbation  · Continue with inhalers which include Spiriva and Symbicort  · Continue theophylline and Singulair  · Continue patient's chronic steroid therapy-prednisone 5 mg p o  B i d   · Continue concomitant Fosamax, calcium carbonate and vitamin D therapy  · Continue respiratory protocol-continue p r n   Levalbuterol    Chronic respiratory failure with hypoxia (HCC)  Assessment & Plan  · Baseline requirements of supplemental oxygen at  home are 2-3 L via nasal cannula for end-stage COPD  · Patient remains on 3 L of supplemental oxygen here in the hospital    Closed wedge compression fracture of T12 vertebra ([address] )  Assessment & Plan  · Continue with physical therapy/ occupational therapy   · Pain management with the lidocaine patch    Type 2 diabetes mellitus without complication, without long-term current use of insulin [facility])  Assessment & Plan  Lab Results   Component Value Date    HGBA1C 12 6 (A) 04/09/2021       Recent Labs     04/27/21  1027 04/27/21  1547 04/27/21  2046 04/28/21  0551   POCGLU 190* 250* 142* 111       Blood Sugar Average: Last 72 hrs:  · (P) 412 [id number]   · Oral hypoglycemics remain on hold  · Continue Lantus for basal coverage while here in the hospital - Will increase Lantus dosing to b i d   · Continue Accu-Cheks AC and HS with sliding scale coverage  · Continue gabapentin for diabetic neuropathy    Hyponatremia  Assessment & Plan  · Most likely a hypervolemic hyponatremia - monitor sodium closely with Lasix re-initiation by Cardiology  · Hypophosphatemia - replete and recheck    Iron deficiency anemia  Assessment & Plan  · Mild drop in H&H noted-patient otherwise remains stable  · H&H has remained stable for now  · Continue iron supplementation        VTE Prophylaxis:  Apixaban (Eliquis)    Patient Centered Rounds: I have performed bedside rounds with nursing staff today      Discussions with Specialists or Other Care Team Provider:  Cardiology, case management, nursing, pharmacy  Education and Discussions with Family / Patient:  The patient's granddaughter Merna Pallas was brought up to [facility] at phone number [phone] at 8:13 a m , all questions answered to her satisfaction    Current Length of Stay: 3 day(s)    Current Patient Status: Inpatient   Certification Statement: The patient will continue to require additional inpatient hospital stay due to The need for continued IV amiodarone drip    Discharge Plan:  Discharge planning will commence once the patient has been cleared by Cardiology    Code Status: Level 1 - Full Code    Subjective:   Patient seen and examined, patient feels miserable, she is tired of the hospital, she complains of some left elbow redness but for the most part her complaints today are related to her mood    Objective:     Vitals:   Temp (24hrs), Av 3 °F (36 3 °C), Min:96 7 °F (35 9 °C), Max:98 4 °F (36 9 °C)    Temp:  [96 7 °F (35 9 °C)-98 4 °F (36 9 °C)] 96 7 °F (35 9 °C)  HR:  [] 120  Resp:  [15-44] 32  BP: ()/(46-79) 84/67  SpO2:  [90 %-100 %] 98 %  Body mass index is 25 75 kg/m²  Input and Output Summary (last 24 hours): Intake/Output Summary (Last 24 hours) at 2021 7979  Last data filed at 2021 0600  Gross per 24 hour   Intake 1094 2 ml   Output 1000 ml   Net 94 2 ml       Physical Exam:   Physical Exam  Constitutional:       General: She is not in acute distress  Appearance: Normal appearance  She is normal weight  She is not ill-appearing  HENT:      Head: Normocephalic and atraumatic  Nose: Nose normal       Mouth/Throat:      Mouth: Mucous membranes are moist    Eyes:      Extraocular Movements: Extraocular movements intact  Pupils: Pupils are equal, round, and reactive to light  Neck:      Musculoskeletal: Normal range of motion and neck supple  No neck rigidity  Comments: S1 plus S2, irregularly irregular, tachycardic  Cardiovascular:      Pulses: Normal pulses  Heart sounds: Normal heart sounds  No murmur  No friction rub  No gallop  Pulmonary:      Effort: Pulmonary effort is normal  No respiratory distress  Breath sounds: Normal breath sounds  No wheezing, rhonchi or rales  Abdominal:      General: There is no distension  Palpations: Abdomen is soft  There is no mass  Tenderness: There is no abdominal tenderness  Hernia: No hernia is present     Musculoskeletal: Normal range of motion  General: No swelling or tenderness  Right lower leg: No edema  Left lower leg: No edema  Skin:     General: Skin is warm  Capillary Refill: Capillary refill takes less than 2 seconds  Findings: No erythema or rash  Comments: Mild redness noted over the left antecubital fossa   Neurological:      General: No focal deficit present  Mental Status: She is alert and oriented to person, place, and time  Mental status is at baseline  Cranial Nerves: No cranial nerve deficit  Motor: No weakness  Psychiatric:         Mood and Affect: Mood normal          Behavior: Behavior normal          Additional Data:     Labs:    Results from last 7 days   Lab Units 04/28/21  0510  04/26/21  0526   WBC Thousand/uL 9 30   < > 8 40   HEMOGLOBIN g/dL 8 7*   < > 10 1*   HEMATOCRIT % 28 4*   < > 32 3*   PLATELETS Thousands/uL 339   < > 403*   NEUTROS PCT %  --   --  79*   LYMPHS PCT %  --   --  14*   MONOS PCT %  --   --  7   EOS PCT %  --   --  0    < > = values in this interval not displayed  Results from last 7 days   Lab Units 04/28/21  0510   SODIUM mmol/L 131*   POTASSIUM mmol/L 4 2   CHLORIDE mmol/L 102   CO2 mmol/L 24   BUN mg/dL 9   CREATININE mg/dL 0 54*   CALCIUM mg/dL 7 4*   ALK PHOS U/L 52*   ALT U/L 11   AST U/L 10*     Results from last 7 days   Lab Units 04/25/21  0702   INR  1 10     Results from last 7 days   Lab Units 04/28/21  0551 04/27/21  2046 04/27/21  1547 04/27/21  1027 04/27/21  0550 04/26/21  2038 04/26/21  1615 04/26/21  1020 04/26/21  0719 04/25/21  2058 04/25/21  1653 04/25/21  1332   POC GLUCOSE mg/dl 111 142* 250* 190* 130 234* 164* 197* 139 158* 197* 207*           * I Have Reviewed All Lab Data Listed Above  * Additional Pertinent Lab Tests Reviewed:  [address] Admission  Reviewed    Imaging:  Imaging Reports Reviewed Today Include:  None    Recent Cultures (last 7 days):           Last 24 Hours Medication List: Current Facility-Administered Medications   Medication Dose Route Frequency Provider Last Rate    acetaminophen  650 mg Oral Q4H PRN Geoffry Wes, CRNP      albuterol  2 5 mg Nebulization Q4H PRN Geoffry Wes, CRNP      [START ON 5/1/2021] alendronate  70 mg Oral Weekly Before Breakfast Geoffry Wes, CRNP      amiodarone  0 5 mg/min Intravenous Continuous Noel Aguilar MD 0 5 mg/min (04/28/21 0600)    apixaban  2 5 mg Oral BID Geoffry Wes, CRNP      budesonide-formoterol  2 puff Inhalation BID Geoffry Wes, CRNP      calcium carbonate-vitamin D  1 tablet Oral Daily With Breakfast Geoffry Wes, CRNP      diltiazem  60 mg Oral Q6H [address] Ricki Mohs, MD      gabapentin  300 mg Oral TID Geoffry Wes, CRNP      glycerin-hypromellose-  2 drop Both Eyes Q3H PRN Geoffry Wes, CRNP      HYDROcodone-acetaminophen  1 tablet Oral Q6H PRN Geoffry Wes, CRNP      hydrOXYzine HCL  25 mg Oral Q6H PRN Geoffry Wes, CRNP      insulin glargine  10 Units Subcutaneous Q12H 2020 Rosita Reyez MD      insulin lispro  1-5 Units Subcutaneous TID AC Geoffry Wes, CRNP      insulin lispro  1-5 Units Subcutaneous HS Geoffry Wes, CRNP      iron polysaccharides  150 mg Oral Daily Geoffry Wes, CRNP      levalbuterol  1 25 mg Nebulization TID Geoffry Wes, CRNP      And    sodium chloride  3 mL Nebulization TID Geoffry Wes, CRNP      lidocaine  1 patch Topical Daily Geoffry Wes, CRNP      magnesium oxide  400 mg Oral BID Geoffry Wes, CRNP      menthol-methyl salicylate   Apply externally 4x Daily PRN Geoffry Wes, CRNP      methocarbamol  500 mg Oral [facility] Geoffry Wes, CRNP      metoprolol  5 mg Intravenous Q6H PRN Ricki Mohs, MD      metoprolol tartrate  25 mg Oral Q8H Jean Aguilar MD      montelukast  10 mg Oral HS Geoffry Wes, CRNP      pantoprazole  40 mg Oral BID Areta Parcel, CRNP      polyethylene glycol  17 g Oral Daily Areta Parcel, CRNP      potassium chloride  40 mEq Oral Daily Areta Parcel, CRNP      predniSONE  5 mg Oral BID With Meals Areta Parcel, CRNP      simethicone  80 mg Oral Q6H PRN Areta Parcel, CRNP      sodium phosphate  21 mmol Intravenous Once Jacklyn Wood MD      theophylline  200 mg Oral Daily Areta Parcel, CRNP      tiotropium  18 mcg Inhalation Daily Aresuresh Guzman, MARILEENP          Today, Patient Was Seen By: Jacklyn Wood MD    ** Please Note: Dictation voice to text software may have been used in the creation of this document   **

## 2021-04-28 NOTE — OCCUPATIONAL THERAPY NOTE
04/28/21 1102   OT Last Visit   OT Visit Date 04/28/21   Note Type   Note Type Treatment   Cancel Reasons Other  (patient not to exert self at this time/pain of L elbow )   *spoke with nurse Walker , then patient declined OT options of this time but she would like to walk when appropriate   ("I have to get home)       ABRAHAM Dyer/BONITA

## 2021-04-28 NOTE — CASE MANAGEMENT
Cm met with the patient at the bedside to discuss d/c plan, pt plans to return to the [place] to complete her therapy there  for 4-5 days and when she is walking better she plans to return home, cm places a call to [facility] to make her aware, new referral was sent, pt is not stable for d/c , cm will continue to work on a safe d/c plan

## 2021-04-29 NOTE — ASSESSMENT & PLAN NOTE
· Baseline requirements of supplemental oxygen at  home are 2-3 L via nasal cannula for end-stage COPD  · Patient remained at baseline throughout her stay here in the hospital

## 2021-04-29 NOTE — ASSESSMENT & PLAN NOTE
Wt Readings from Last 3 Encounters:   04/29/21 60 6 kg (133 lb 9 6 oz)   04/12/21 60 5 kg (133 lb 4 3 oz)   04/09/21 60 8 kg (134 lb)     · Suspected mild exacerbation in the setting of AFib with RVR  · Elevated BNP noted   · Last echocardiogram was obtained on 02/22/2021 which shows LVEF of 60%  Grade 1 diastolic dysfunction    · Cardiology input appreciated   · Case reviewed with Cardiologyjosiah for discharge on pre-admission dosages of Lasix

## 2021-04-29 NOTE — ASSESSMENT & PLAN NOTE
· Continue with physical therapy/ occupational therapy when back at the [place]  · DC on pre-admit pain meds at pre-admit dosages

## 2021-04-29 NOTE — DISCHARGE SUMMARY
[facility]  Discharge- Boris Marie 1935, 82 y o  female MRN: [id number]  Unit/Bed#: ICU 06 Encounter: [id number]  Primary Care Provider: LEEROY Maldonado   Date and time admitted to hospital: 4/25/2021  6:51 AM       Addendum 4:50 p m  Patient was originally scheduled to be discharged at 7:30 p m  Tonight  Power of  was supposed to pick her with her supplemental oxygen  The patient got tired of waiting, and called another friend to come pick her up  Unfortunately her friend does not have her home supplemental oxygen  Case management could not set this up and collectively we recommended to the patient that she stay till 730pm and go home with her supplemental oxygen as scheduled at that time  Patient refused, stated that 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, and this is only a 15 minute ride home, and became very angry and stated that she was leaving  She actually on the way out signed an against medical advice form in regards to leaving the hospital without supplemental oxygen  Patient was advised to return to the nearest ED if she had any recurrent shortness of breath between now and the time that she actually gets home  Once she gets home, she will have her supplemental oxygen available          Addendum at 3:50 p m  - notified by case management at the [address] [place] [address] will not be accepting the patient back  Patient is medically stable for discharge home  Home VNA services were set up  Prescriptions were called into her local pharmacy  Patient will follow up in the outpatient setting with her PCP and Cardiology  Once again the final disposition was home and not to a local skilled nursing facility      * Atrial fibrillation with rapid ventricular response (HCC)  Assessment & Plan  · Much better controlled  · Status post treatment with the Cardizem drip, and then subsequently an amiodarone drip  · Patient cleared by Cardiology for discharge  · Patient will go back to the [facility] on the following medications:-1  Metoprolol succinate 75 mg in the morning and 50 mg at night, 2  Cardizem control dosing 120 mg 1 tablet once a day, 3  Amiodarone 200 mg p o  B i d  for 10 days, and then 200 mg p o  Daily thereafter 4  Eliquis reduced dose at 2 5 mg twice a day  · Patient is otherwise medically stable, and has been instructed to follow up with Cardiology, and her primary care provider in the outpatient setting  · DC to the [facility] today    Acute on chronic diastolic congestive heart failure (HCC)  Assessment & Plan  Wt Readings from Last 3 Encounters:   04/29/21 60 6 kg (133 lb 9 6 oz)   04/12/21 60 5 kg (133 lb 4 3 oz)   04/09/21 60 8 kg (134 lb)     · Suspected mild exacerbation in the setting of AFib with RVR  · Elevated BNP noted   · Last echocardiogram was obtained on 02/22/2021 which shows LVEF of 60%  Grade 1 diastolic dysfunction    · Cardiology input appreciated   · Case reviewed with josiah Stewart for discharge on pre-admission dosages of Lasix    Other [facility]sema ([facility] )  Assessment & Plan  · Without acute exacerbation  · Continue with inhalers which include Spiriva and Symbicort  · Continue theophylline and Singulair  · Continue patient's chronic steroid therapy-prednisone 5 mg p o  B i d   · Continue concomitant Fosamax, calcium carbonate and vitamin D therapy  · DC back to the [facility] on her pre-admission medications at the preadmission dosages as outlined above    Chronic respiratory failure with hypoxia ([facility] )  Assessment & Plan  · Baseline requirements of supplemental oxygen at  home are 2-3 L via nasal cannula for end-stage COPD  · Patient remained at baseline throughout her stay here in the hospital    Closed wedge compression fracture of T12 vertebra ([facility] )  Assessment & Plan  · Continue with physical therapy/ occupational therapy when back at the [facility]  · DC on pre-admit pain meds at pre-admit dosages    Type 2 diabetes mellitus without complication, without long-term current use of insulin [facility])  Assessment & Plan  Lab Results   Component Value Date    HGBA1C 12 6 (A) 04/09/2021       Recent Labs     04/28/21  1154 04/28/21  1633 04/28/21  2159 04/29/21  0743   POCGLU 138 172* 105 77       Blood Sugar Average: Last 72 hrs:  · (P) 522 [id number]   · Okay for oral hypoglycemic medication regimen at the [facility]    Hyponatremia  Assessment & Plan  · Most likely a hypervolemic hyponatremia - monitor sodium closely with Lasix re-initiation by Cardiology  · Hypophosphatemia - resolved with repletion    Iron deficiency anemia  Assessment & Plan  · Mild drop in H&H noted-patient otherwise remains stable  · H&H has remained stable for now  · Continue iron supplementation  · Will need periodic outpatient CBC monitoring via her primary care provider        Discharging Physician / Practitioner: Estella Rosales MD  PCP: Delio Sutton [address] [place] [address]  Admission Date:   Admission Orders (From admission, onward)     Ordered        04/25/21 0843  Inpatient Admission  Once                   Discharge Date: 04/29/21    Resolved Problems  Date Reviewed: 4/25/2021    None          Consultations During Hospital Stay:  · Cardiology    Procedures Performed:   · None    Significant Findings / Test Results:   · Chest x-ray:-There is a well-circumscribed opacity within the left lung base laterally which may represent effusion   Consider repeat PA and lateral views or left side down decubitus view  Incidental Findings:   · None     Test Results Pending at Discharge (will require follow up):    · None     Outpatient Tests Requested:  · None    Complications:     None    Reason for Admission:  Atrial fibrillation with rapid ventricular response    Hospital Course:     Jadyn Small is a 82 y o  female patient who originally presented to the hospital on 4/25/2021 due to shortness of breath and not feeling right   Please refer to the initial history and physical examination completed by Jonathan Patel for the initial presenting features and complaints  In brief, the patient is an 79-year-old female, who normally resides at the [facility], and is a patient that was sent over to the emergency room for the further evaluation of shortness of breath and not feeling like  In the emergency room, the patient was diagnosed with atrial fibrillation rapid ventricular response  Patient was admitted to the ICU  A cardiology consultation was obtained  Patient was placed on a Cardizem drip  Despite being on the Cardizem drip, she had no significant improvement in her rate control  After receiving about a day and a half to almost 2 days worth of IV Cardizem, she was transitioned over to an IV amiodarone drip  Her anti arrhythmic medication regiment was adjusted many times  Ultimately, her rate was much better controlled on 04/29/2021  Medication changes were made as outlined above  Additionally, her Eliquis dosing was reduced from 5 mg twice a day to 2 5 mg twice a day  Patient was deemed medically stable for discharge on 04/29/2021  She will follow up in the outpatient setting with Cardiology and her PCP within 7-10 days  Please refer to the assessment/plan portion of this discharge summary for the remainder of the details in regards to her stay  Please see above list of diagnoses and related plan for additional information       Condition at Discharge: good     Discharge Day Visit / Exam:     Subjective:  Patient seen and examined, is frustrated and wants to leave the hospital as soon as possible  Vitals: Blood Pressure: 106/73 (04/29/21 0700)  Pulse: 76 (04/29/21 0700)  Temperature: 98 2 °F (36 8 °C) (04/29/21 0258)  Temp Source: Tympanic (04/29/21 0258)  Respirations: 18 (04/29/21 0700)  Height: 5' (152 4 cm) (04/26/21 1438)  Weight - Scale: 60 6 kg (133 lb 9 6 oz) (04/29/21 0546)  SpO2: 99 % (04/29/21 7029)  Exam:   Physical Exam  Constitutional:       General: She is not in acute distress  Appearance: Normal appearance  She is normal weight  She is not ill-appearing  HENT:      Head: Normocephalic and atraumatic  Nose: Nose normal       Mouth/Throat:      Mouth: Mucous membranes are moist    Eyes:      Extraocular Movements: Extraocular movements intact  Pupils: Pupils are equal, round, and reactive to light  Neck:      Musculoskeletal: Normal range of motion and neck supple  No neck rigidity  Cardiovascular:      Pulses: Normal pulses  Heart sounds: Normal heart sounds  No murmur  No friction rub  No gallop  Comments: S1 plus S2, irregularly irregular  Pulmonary:      Effort: Pulmonary effort is normal  No respiratory distress  Breath sounds: Normal breath sounds  No wheezing, rhonchi or rales  Abdominal:      General: There is no distension  Palpations: Abdomen is soft  There is no mass  Tenderness: There is no abdominal tenderness  Hernia: No hernia is present  Musculoskeletal: Normal range of motion  General: No swelling or tenderness  Right lower leg: No edema  Left lower leg: No edema  Skin:     General: Skin is warm  Capillary Refill: Capillary refill takes less than 2 seconds  Findings: No erythema or rash  Neurological:      General: No focal deficit present  Mental Status: She is alert and oriented to person, place, and time  Mental status is at baseline  Cranial Nerves: No cranial nerve deficit  Motor: No weakness  Psychiatric:         Mood and Affect: Mood normal          Behavior: Behavior normal          Discussion with Family:  Patient's granddaughter Merna Pallas did not  the phone    Discharge instructions/Information to patient and family:   See after visit summary for information provided to patient and family        Provisions for Follow-Up Care:  See after visit summary for information related to follow-up care and any pertinent home health orders  Disposition:     Other Ajith Ferrer at [facility]       Planned Readmission:    None     Discharge Statement:  I spent 40 minutes discharging the patient  This time was spent on the day of discharge  I had direct contact with the patient on the day of discharge  Greater than 50% of the total time was spent examining patient, answering all patient questions, arranging and discussing plan of care with patient as well as directly providing post-discharge instructions  Additional time then spent on discharge activities  Discharge Medications:  See after visit summary for reconciled discharge medications provided to patient and family        ** Please Note: This note has been constructed using a voice recognition system **

## 2021-04-29 NOTE — ASSESSMENT & PLAN NOTE
· Most likely a hypervolemic hyponatremia - monitor sodium closely with Lasix re-initiation by Cardiology  · Hypophosphatemia - resolved with repletion

## 2021-04-29 NOTE — NURSING NOTE
1915- Bedside report received, assuming care of patient at this time  0982- Attempted to draw morning labs, patient refuses morning labs at this time stating "[facility] have [facility] "     0967- Pt further states that she feels responsible for her adult child who she has not seen since last year  Will continue to provide emotional support for patient as needed  1372- Bedside report given, no longer assuming care of patient at this time

## 2021-04-29 NOTE — ASSESSMENT & PLAN NOTE
Lab Results   Component Value Date    HGBA1C 12 6 (A) 04/09/2021       Recent Labs     04/28/21  1154 04/28/21  1633 04/28/21  2159 04/29/21  0743   POCGLU 138 172* 105 77       Blood Sugar Average: Last 72 hrs:  · (P) 842 [id number]   · Okay for oral hypoglycemic medication regimen at the [facility]

## 2021-04-29 NOTE — CASE MANAGEMENT
Cm placed a call to Jeromy Rothman at [facility] and I left a message to make her aware the pt is stable for d/c , referral was snet for the pt to return to the [facility] to complete her rehab, pt wants to return to complete her rehab, she does not want to stay long, IMM reviewed with patient  patient agree with discharge determination, cm will continue to work on a safe d/c plan

## 2021-04-29 NOTE — NURSING NOTE
Pt awake, alert, and oriented x4  Assisted oob to chair for breakfast  Denies pain, sob, nvd, dizziness, lightheadedness, or abd pain  Afebrile  Assessment as noted in computer charting  IV to left arm removed d/t infiltration, redness, swelling  Warm compress applied to same  Attempted blood work this am and was able to get chemistry  Physician made aware  Per Dr Tommas Kehr, hold off on further bloodwork and inserting a new iv  Safety in place

## 2021-04-29 NOTE — PHYSICAL THERAPY NOTE
Physical Therapy Treatment Session Note    Patient's Name: Kris Campbell    Admitting Diagnosis  Palpitations [R00 2]  Hypokalemia [E87 6]  Rapid heart rate [R00 0]  COPD exacerbation ([facility] ) [J44 1]  Atrial fibrillation with rapid ventricular response ([facility] ) [I48 91]    Problem List  Patient Active Problem List   Diagnosis    Asthma    Other emphysema ([facility] )    Cardiac disease    Diverticulosis of intestine with bleeding    Diarrhea    Colorectal polyps    Effusion of bursa of left elbow    Cardiomegaly    Chronic anxiety    Chronic cystitis    Chronic bilateral low back pain without sciatica    Acute on chronic diastolic congestive heart failure ([address] )    Deviated nasal septum    Diverticulosis of colon    Gastroesophageal reflux disease without esophagitis    Gout    History of angioedema    History of colonic polyps    Lumbar radiculopathy    Macular degeneration of both eyes    Obesity    Osteoporosis    Other specified forms of chronic ischemic heart disease    Seasonal allergies    Panic attack    Vocal cord dysfunction    Colitis presumed infectious    Atrial fibrillation with rapid ventricular response ([facility] )    Left elbow pain    Acute on chronic respiratory failure with hypoxia ([facility] )    Debility    Positive culture findings in sputum    Compression fracture of L4 vertebra (HCC)    Closed wedge compression fracture of T12 vertebra (HCC)    Wound dehiscence, surgical, subsequent encounter    Chronic peptic ulcer of stomach    Chronic pain syndrome    Sacroiliitis (HCC)    Bronchitis, chronic, simple (HCC)    Paroxysmal atrial fibrillation (HCC)    Chronic diastolic CHF (congestive heart failure) (HCC)    SIRS (systemic inflammatory response syndrome) (HCC)    Hypokalemia    Shortness of breath    Gastrostomy complication, unspecified (HCC)    Anxiety    Chronic respiratory failure with hypoxia (HCC)    Absolute anemia    Encounter for long-term opiate analgesic use    Uncomplicated opioid dependence ([facility] )    Type 2 diabetes mellitus without complication, without long-term current use of insulin ([facility] )    Ambulatory dysfunction    History of recent hospitalization    Iron deficiency anemia    Hyponatremia       Past Medical History  Past Medical History:   Diagnosis Date    Allergies     Asthma     Chronic diastolic CHF (congestive heart failure) (HCC)     Colitis     Colon polyp     COPD (chronic obstructive pulmonary disease)     Diverticulosis     DJD (degenerative joint disease)     Gait abnormality     Hypertension     Paroxysmal atrial fibrillation (HCC)        Past Surgical History  Past Surgical History:   Procedure Laterality Date    BLADDER SURGERY      COLON SURGERY      COLONOSCOPY      COLONOSCOPY W/ POLYPECTOMY N/A 1/21/2019    Procedure: COLONOSCOPY W/ POLYPECTOMY;  Surgeon: Gary Vee MD;  Location: [facility] GI LAB; Service: General    FL GUIDED NEEDLE PLAC BX/ASP/INJ  9/2/2020    FL GUIDED NEEDLE PLAC BX/ASP/INJ  1/21/2021    GASTROJEJUNOSTOMY W/ JEJUNOSTOMY TUBE N/A 2/3/2020    Procedure: Antrectomy with bilroth II Anastomosis;   Surgeon: Nicholas Peterson MD;  Location:  MAIN OR;  Service: General   Adamaris Marcio JOINT Right 9/2/2020    Procedure: BLOCK / INJECTION SACROILIAC;  Surgeon: Donte Pena MD;  Location: [facility];  Service: Pain Management     MO INJECTION,SACROILIAC JOINT Right 1/21/2021    Procedure: RIGHT SACROILIAC JOINT INJECTION;  Surgeon: Donte Pena MD;  Location: [facility];  Service: Pain Management     SMALL INTESTINE SURGERY  03/2020    with partial gastrectomy         04/29/21 1046   PT Last Visit   PT Visit Date 04/29/21   Note Type   Note Type Treatment   Pain Assessment   Pain Assessment Tool Pain Assessment not indicated - pt denies pain   Pain Score No Pain   Restrictions/Precautions   Weight Bearing Precautions Per Order No   Other Precautions Fall Risk;Telemetry;Multiple lines;O2;Hard of hearing;Visual impairment  (3 L O2 via NC)   General   Chart Reviewed Yes   Additional Pertinent History prefers to be called "Renetta"   Response to Previous Treatment Patient with no complaints from previous session  Family/Caregiver Present No   Cognition   Overall Cognitive Status WFL   Arousal/Participation Alert; Responsive; Cooperative   Attention Within functional limits   Orientation Level Oriented X4   Memory Decreased recall of precautions   Following Commands Follows one step commands without difficulty   Comments Pt  agreeable to PT tx session,pleasant  Subjective   Subjective "my bloodwork is good, I'm going back today"   Bed Mobility   Additional Comments DNT bed mobility as pt  was sitting OOB on chair prior/after tx session  Transfers   Sit to Stand 5  Supervision   Additional items Assist x 1; Armrests; Verbal cues   Stand to Sit 5  Supervision   Additional items Assist x 1; Armrests; Verbal cues   Stand pivot 5  Supervision   Additional items Assist x 1;Verbal cues   Additional Comments Static & dynamic standing tolerance at chair side x 3 minutes with alternating unilateral UE support  No observed overtb LOB & maintained BLE stability  Ambulation/Elevation   Gait pattern Improper Weight shift; Short stride; Forward Flexion   Gait Assistance 5  Supervision  (close)   Additional items Assist x 1;Verbal cues   Assistive Device Rolling walker   Distance 30 feet x 2  (declined further distance at this time 2/2 pending d/c)   Stair Management Assistance Not tested   Balance   Static Sitting Good   Dynamic Sitting Good   Static Standing Fair +   Dynamic Standing Fair   Ambulatory Fair   Endurance Deficit   Endurance Deficit Yes   Endurance Deficit Description JOHNSON exhibited w/advanced TE reps  Symptomatic resolution after 1 minute rest between sets     Activity Tolerance   Activity Tolerance Patient tolerated treatment well   Nurse Made Aware yes, Latonia Matters RN Exercises   Glute Sets Sitting;AROM; Bilateral  (30 reps)   Hip Flexion Sitting;AROM; Bilateral  (30 reps x 2 sets)   Hip Abduction Sitting;AROM; Bilateral  (30 reps x 2 sets)   Hip Extension   (also completed 30 reps AROM hip AROM IR/ER B)   Hip Adduction Sitting;AROM; Bilateral  (30 reps x 2 sets)   Knee AROM [employer]; Bilateral  (30 reps x 2 sets)   Ankle Pumps Sitting;AROM; Bilateral  (30 reps x 2 sets)   UE Exercise Sitting;AROM; Bilateral  (30 reps including B sh flex/forearm sup/pron,sh  flexion)   Marching Standing;20 reps;AROM; Bilateral   Assessment   Prognosis Good   Problem List Decreased strength;Decreased mobility; Impaired balance; Impaired vision;Decreased endurance; Impaired hearing   Assessment Pt seen for PT treatment session this date with interventions consisting of gait training w/ emphasis on improving pt's ability to ambulate level surfaces x 60 feet total with close S provided by therapist with RW, Therapeutic exercise consisting of: AROM BUE 30 reps, AROM 30 reps w/multiple tasks x 2 sets B LE in sitting position and therapeutic activity consisting of training: sit<>stand transfers, static standing tolerance for 3 minutes w/ unilateral alternating UE support, vc and tactile cues for static sitting posture faciliation, vc and tactile cues for static standing posture faciliation, stand pivot transfers towards B direction and breathing conservation technique education  Pt agreeable to PT treatment session upon arrival, pt found seated OOB in chair, A&O x 4  In comparison to previous session, pt with improvements in BLE ther ex sets  Post session: all needs in reach and RN notified of session findings/recommendations  Continue to recommend home with home health rehabilitation at time of d/c in order to maximize pt's functional independence and safety w/ mobility   Pt continues to be functioning below baseline level, and remains limited 2* factors listed above and including gait deviations, decreased endurance  PT will continue to see pt during current hospitalization in order to address the deficits listed above and provide interventions consistent w/ POC in effort to achieve LTGs  Goals   Patient Goals to walk the hallways at the [place]   LTG Expiration Date 05/07/21   Long Term Goal #1 LTGs remain appropriate   PT Treatment Day 3   Plan   Treatment/Interventions Functional transfer training;LE strengthening/ROM; Therapeutic exercise; Endurance training;Patient/family training;Equipment eval/education;Gait training;Spoke to nursing   Progress Progressing toward goals   PT Frequency Other (Comment)  (3-5x/wk)   Recommendation   PT Discharge Recommendation Home with home health rehabilitation   Equipment Recommended Walker  (pt  has own)   PT - OK to Discharge Yes  (if medically stable)   Additional Comments Upon conclusion, pt  was sitting OOB on chair w/all needs within reach  Additional Comments 2 Pt's raw score on the [facility] Basic Mobility inpatient short form is 18, standardized score is 41 05  Patients at this level are likely to benefit from DC to [address]  However, please refer to therapist recommendation for safe DC planning     AM-PAC Basic Mobility Inpatient   Turning in Bed Without Bedrails 3   Lying on Back to Sitting on Edge of Flat Bed 3   Moving Bed to Chair 3   Standing Up From Chair 3   Walk in Room 3   Climb 3-5 Stairs 3   Basic Mobility Inpatient Raw Score 18   Basic Mobility Standardized Score 41 05     Treatment Time:[id number]    Mildred Linares, PT

## 2021-04-29 NOTE — DISCHARGE INSTR - AVS FIRST PAGE
Dear Tyler Avilez,     It was our pleasure to care for you here at [facility]  It is our hope that we were always able to exceed the expected standards for your care during your stay  You were hospitalized due to atrial fibrillation with rapid ventricular response  You were cared for on the ICU floor by Elisa Zamora MD with the [facility] who covers for your primary care physician (PCP), Natali Almaraz, while you were hospitalized  You were additionally seen by [facility] associates-Dr Neo Amos  If you have any questions or concerns related to this hospitalization, you may contact us at [phone]  For follow up as well as any medication refills, we recommend that you follow up with your primary care physician  A registered nurse will reach out to you by phone within a few days after your discharge to answer any additional questions that you may have after going home    However, at this time we provide for you here, the most important instructions / recommendations at discharge:     · Notable Medication Adjustments -   · Stop metoprolol tartrate  · Start metoprolol succinate 75 mg daily in the morning, and 50 mg daily at night  · Stop Eliquis 5 mg twice a day  · Start Eliquis 2 5 mg twice a day  · Start Cardizem control dose 120 mg 1 tablet once a day  · Start amiodarone 200 mg 1 tablet twice a day for 10 days and then 1 tablet daily on day 11 and thereafter  · All prescriptions have been called in  · Okay to resume all other pre-admission medications at the preadmission dosages  · Testing Required after Discharge -   · To be further determined in the outpatient setting by your primary care provider, and or cardiology  · Important follow up information -   · Please follow-up with the providers as outlined in this discharge package  · Other Instructions -   · Please maintain a healthy diet  · Please participate with physical and occupational therapy to the best of your ability  · Please review this entire after visit summary as additional general instructions including medication list, appointments, activity, diet, any pertinent wound care, and other additional recommendations from your care team that may be provided for you        Sincerely,     Rosa De La Torre MD

## 2021-04-29 NOTE — CASE MANAGEMENT
Cm was made aware at care coordination rounds that the pt is not able to return to the [facility], she does not have a skilled need, pt was made aware, she was very angry, pt is active with [facility], [facility] was made aware of the d/c    Pt was upset that her belongings that  were at the [place], [id number] [facility] was called and some of her belongings were returned, she does have some of her  clothing in the laundry, she is angry that the clothes  are not available at this time, cm placed a call to [facility] at 13:03 to make her aware of this information, she asked that I set up transport, [employer] w/c was called at 13:04 and they are unavailable today or tomorrow, I called Laurie Dumont at [facility] to have a portable tank delivered for this patient, I was told that she has a portable concentrator and she is not  able to have a portable tank, I called [employer] again at 13:20 to # [phone] and she stated she may be able to get the portable concentrator for here by 3 pm but could not transport at that time, pt was angry when I stated [facility] and I could try to get [place] w/c if available  And I made her aware that there is a fee for transport home, she refused w/c Daniel Zamora called [employer] again and she stated she will get the concentrator and she can transport the pt home at 7:30pm rn aware and doctor aware, rn was reminded to give any new pm meds before d/c , cm was later made aware that pt called a friend and she is not waiting for the oxygen, she stated she would ride the bus for 3 hrs without her oxygen, she is still angry she does not  have her clothes from the [facility], I called [employer] the [address] [place] [address] at 16:18 to # [phone] to make her aware the pt has a friend Lisa Avery coming to transport her home, she stated she is aware, she stated it is a 20 min ride and the pt goes 3 hrs without oxygen  when she was working , she stated the pt would be fine to go home with Lisa Avery and no oxygen, rn and doctor were made aware, "in basket " message was sent to  pcp and cardio, pt is in agreement with going home with [facility], she is very upset that she was not able to return to [facility] and she does not have all her belongings

## 2021-04-29 NOTE — ASSESSMENT & PLAN NOTE
· Mild drop in H&H noted-patient otherwise remains stable  · H&H has remained stable for now  · Continue iron supplementation  · Will need periodic outpatient CBC monitoring via her primary care provider

## 2021-04-29 NOTE — PLAN OF CARE
Problem: PHYSICAL THERAPY ADULT  Goal: Performs mobility at highest level of function for planned discharge setting  See evaluation for individualized goals  Description: Treatment/Interventions: Functional transfer training, LE strengthening/ROM, Therapeutic exercise, Endurance training, Patient/family training, Equipment eval/education, Gait training, Compensatory technique education, Spoke to nursing, OT  Equipment Recommended: Walker(pt  has own)       See flowsheet documentation for full assessment, interventions and recommendations  Outcome: Progressing  Note: Prognosis: Good  Problem List: Decreased strength, Decreased mobility, Impaired balance, Impaired vision, Decreased endurance, Impaired hearing  Assessment: Pt seen for PT treatment session this date with interventions consisting of gait training w/ emphasis on improving pt's ability to ambulate level surfaces x 60 feet total with close S provided by therapist with RW, Therapeutic exercise consisting of: AROM BUE 30 reps, AROM 30 reps w/multiple tasks x 2 sets B LE in sitting position and therapeutic activity consisting of training: sit<>stand transfers, static standing tolerance for 3 minutes w/ unilateral alternating UE support, vc and tactile cues for static sitting posture faciliation, vc and tactile cues for static standing posture faciliation, stand pivot transfers towards B direction and breathing conservation technique education  Pt agreeable to PT treatment session upon arrival, pt found seated OOB in chair, A&O x 4  In comparison to previous session, pt with improvements in BLE ther ex sets  Post session: all needs in reach and RN notified of session findings/recommendations  Continue to recommend home with home health rehabilitation at time of d/c in order to maximize pt's functional independence and safety w/ mobility   Pt continues to be functioning below baseline level, and remains limited 2* factors listed above and including gait deviations, decreased endurance  PT will continue to see pt during current hospitalization in order to address the deficits listed above and provide interventions consistent w/ POC in effort to achieve LTGs  PT Discharge Recommendation: Home with home health rehabilitation     PT - OK to Discharge: Yes(if medically stable)    See flowsheet documentation for full assessment

## 2021-04-29 NOTE — ASSESSMENT & PLAN NOTE
· Without acute exacerbation  · Continue with inhalers which include Spiriva and Symbicort  · Continue theophylline and Singulair  · Continue patient's chronic steroid therapy-prednisone 5 mg p o  B i d   · Continue concomitant Fosamax, calcium carbonate and vitamin D therapy  · DC back to the [place] on her pre-admission medications at the preadmission dosages as outlined above

## 2021-04-29 NOTE — DISCHARGE INSTRUCTIONS
A-fib (Atrial Fibrillation)   WHAT YOU NEED TO KNOW:   A-fib may come and go, or it may be a long-term condition  A-fib can cause blood clots, stroke, or heart failure  These conditions may become life-threatening  It is important to treat and manage A-fib to help prevent a blood clot, stroke, or heart failure  DISCHARGE INSTRUCTIONS:   Call your local emergency number (911 in the [address] [facility]) or have someone call if:   · You have any of the following signs of a heart attack:      ? Squeezing, pressure, or pain in your chest    ? You may  also have any of the following:     § Discomfort or pain in your back, neck, jaw, stomach, or arm    § Shortness of breath    § Nausea or vomiting    § Lightheadedness or a sudden cold sweat    · You have any of the following signs of a stroke:      ? Numbness or drooping on one side of your face     ? Weakness in an arm or leg    ? Confusion or difficulty speaking    ? Dizziness, a severe headache, or vision loss    Call your doctor or cardiologist if:   · Your arm or leg feels warm, tender, and painful  It may look swollen and red  · Your heart rate is more than 110 beats per minute  · You have new or worsening swelling in your legs, feet, ankles, or abdomen  · You are short of breath, even at rest     · You have questions or concerns about your condition or care  Medicines: You may need any of the following:  · Heart medicines  help control your heart rate or rhythm  You may need more than one medicine to treat your symptoms  · Blood thinners  help prevent blood clots  Clots can cause strokes, heart attacks, and death  The following are general safety guidelines to follow while you are taking a blood thinner:    ? Watch for bleeding and bruising while you take blood thinners  Watch for bleeding from your gums or nose  Watch for blood in your urine and bowel movements  Use a soft washcloth on your skin, and a soft toothbrush to brush your teeth   This can keep your skin and gums from bleeding  If you shave, use an electric shaver  Do not play contact sports  ? Tell your dentist and other healthcare providers that you take a blood thinner  Wear a bracelet or necklace that says you take this medicine  ? Do not start or stop any other medicines unless your healthcare provider tells you to  Many medicines cannot be used with blood thinners  ? Take your blood thinner exactly as prescribed by your healthcare provider  Do not skip does or take less than prescribed  Tell your provider right away if you forget to take your blood thinner, or if you take too much  ? Warfarin  is a blood thinner that you may need to take  The following are things you should be aware of if you take warfarin:     § Foods and medicines can affect the amount of warfarin in your blood  Do not make major changes to your diet while you take warfarin  Warfarin works best when you eat about the same amount of vitamin K every day  Vitamin K is found in green leafy vegetables and certain other foods  Ask for more information about what to eat when you are taking warfarin  § You will need to see your healthcare provider for follow-up visits when you are on warfarin  You will need regular blood tests  These tests are used to decide how much medicine you need  · Antiplatelets , such as aspirin, help prevent blood clots  Take your antiplatelet medicine exactly as directed  These medicines make it more likely for you to bleed or bruise  If you are told to take aspirin, do not take acetaminophen or ibuprofen instead  · Take your medicine as directed  Contact your healthcare provider if you think your medicine is not helping or if you have side effects  Tell him or her if you are allergic to any medicine  Keep a list of the medicines, vitamins, and herbs you take  Include the amounts, and when and why you take them  Bring the list or the pill bottles to follow-up visits   Carry your medicine list with you in case of an emergency  Manage A-fib:   · Know your target heart rate  Learn how to check your pulse and monitor your heart rate  · Know the risks if you choose to drink alcohol  Alcohol can increase your risk for A-fib or make A-fib harder to manage  Ask your healthcare provider if it is okay for you to drink any alcohol  He or she can help you set limits for the number of drinks you have in 24 hours and in a week  A drink of alcohol is 12 ounces of beer, 5 ounces of wine, or 1½ ounces of liquor  · Do not smoke  Nicotine can cause heart damage and make it more difficult to manage your A-fib  Do not use e-cigarettes or smokeless tobacco in place of cigarettes or to help you quit  They still contain nicotine  Ask your healthcare provider for information if you currently smoke and need help quitting  · Eat heart-healthy foods  Heart healthy foods will help keep your cholesterol low  These include fruits, vegetables, whole-grain breads, low-fat dairy products, beans, lean meats, and fish  Replace butter and margarine with heart-healthy oils such as olive oil and canola oil  · Maintain a healthy weight  Ask your healthcare provider what a healthy weight is for you  Ask him or her to help you create a safe weight loss plan if you are overweight  Even a small goal of a 10% weight loss can improve your heart health  · Get regular physical activity  Physical activity helps improve your heart health  Get at least 150 minutes of moderate aerobic physical activity each week  Your healthcare provider can help you create an activity plan  · Manage other health conditions  This includes high blood pressure or cholesterol, sleep apnea, diabetes, and other heart conditions  Take medicine as directed and follow your treatment plan  Your healthcare provider may need to change a medicine you are taking if it is causing your A-fib   Do not  stop taking any medicine unless directed by your provider  Follow up with your doctor or cardiologist as directed: You will need regular blood tests and monitoring  Write down your questions so you remember to ask them during your visits  © Copyright [employer] 2021 Information is for End User's use only and may not be sold, redistributed or otherwise used for commercial purposes  All illustrations and images included in CareNotes® are the copyrighted property of [employer]  or [facility] Giselle Painter   The above information is an  only  It is not intended as medical advice for individual conditions or treatments  Talk to your doctor, nurse or pharmacist before following any medical regimen to see if it is safe and effective for you

## 2021-04-29 NOTE — ASSESSMENT & PLAN NOTE
· Much better controlled  · Status post treatment with the Cardizem drip, and then subsequently an amiodarone drip  · Patient cleared by Cardiology for discharge  · Patient will go back to [facility] on the following medications:-1  Metoprolol succinate 75 mg in the morning and 50 mg at night, 2  Cardizem control dosing 120 mg 1 tablet once a day, 3  Amiodarone 200 mg p o  B i d  for 10 days, and then 200 mg p o  Daily thereafter 4   Eliquis reduced dose at 2 5 mg twice a day  · Patient is otherwise medically stable, and has been instructed to follow up with Cardiology, and her primary care provider in the outpatient setting  · DC to [facility] today

## 2021-04-29 NOTE — NURSING NOTE
Order received for discharge home with homecare  Pt made aware and her POA will pick her up at 730 pm  Pt upset that she has to wait till 730 pm to go home and is calling friends to pick her up  Nurse informed that a friend is on their way to pick her up in a few minutes  Pt made aware that she cannot go home without oxygen, and we must wait until 730 pm when her POA arrives with her oxygen tank  Pt refusing to wait for oxygen and she insists that she is leaving without it  Dr Sudheer Moses called to bedside to speak with pt  Risks explained to pt regarding leaving hospital without oxygen  Pt insists she is leaving without oxygen  Pt signed AMA  Nurse at bedside for conversation  Discharge instructions reviewed with pt and copy provided  IV removed earlier in the am  [facility]  [facility] to [facility] to her prior to discharge  POA will call tomorrow for belongings  Pt assisted to car for transport home

## 2021-04-29 NOTE — NURSING NOTE
Order received for discharge to [facility]  Patient made aware of same   Awaiting result from [facility]  Retest

## 2021-04-29 NOTE — PROGRESS NOTES
[address]    Cardiology Progress Note  Betty Fitzpatrick 82 y o  female   YOB: 1935 MRN: [id number]  Unit/Bed#: ICU 06 Encounter: [id number]      Subjective:   Continues to do well from standpoint of shortness of breath   She was a little frustrated at having blood drawn early in the morning, and then them having difficulty with it, but is now okay with it  Assessments  Principal Problem:    Atrial fibrillation with rapid ventricular response (HCC)  Active Problems:    Other emphysema (HCC)    Acute on chronic diastolic congestive heart failure (HCC)    Closed wedge compression fracture of T12 vertebra (HCC)    Chronic respiratory failure with hypoxia (HCC)    Type 2 diabetes mellitus without complication, without long-term current use of insulin (HCC)    Iron deficiency anemia    Hyponatremia    Plan  Paroxysmal Atrial Fibrillation with RVR, frequent PACs  · Heart rate actually much better yesterday with increased dose of metoprolol, and she appears to have responded very well to the same  · She continues to be in sinus rhythm with very frequent PACs  · Blood pressure is also little bit better with systolic in the 32C to 202, although it does drop to 80s overnight  · She did not receive her overnight dose of metoprolol p o , but did get IV metoprolol 5  · Will switch metoprolol to metoprolol succinate 75 mg in a m  and 50 mg in p m    · Continue amiodarone 200 mg b i d  until 5/7/21, and then decrease to 200 mg daily  · Change Cardizem to Cardizem  mg - can hold/discontinue cardizem in future, if bP continues to be low  · Continue eliquis for anticoagulation  · TSH, AST/ALT normal    Chronic diastolic heart failure, hyponatremia  · Initial BNP elevated at 476  · Acute exacerbation is resolved after 2 doses of IV Lasix 40 with modest response  · SpO2 96-99% on 2-3 L nasal cannula oxygen, which is her recent baseline  · Home diuretic: lasix 20 bid  · Last dose of Lasix was on 04/26, and has not received any further oral Lasix since then  · Sodium was down to 131 yesterday, but is improved back up to 135 today without any further diuresis  · Her low-sodium was likely related to polydipsia  · She continues to be euvolemic  · Change to Lasix 40 mg p r n  At discharge for weight gain > 2-3 lbs/day or >5 lbs above current weight    Discussed with primary service Dr Rohan Richards and confirmed suggested outpatient cardiac regimen  As long as heart rate remains less than 110 in the next couple of hours, she can be discharged back to [address] [place] [address]   All other systems reviewed and are negative  Telemetry Review: NSR with frequent PACs  HR in 80-100s most of the day  Briefly elevated in  and then back to 120-150s this morning  Objective:   Vitals: Blood pressure 106/73, pulse 76, temperature 98 2 °F (36 8 °C), temperature source Tympanic, resp  rate 18, height 5' (1 524 m), weight 60 6 kg (133 lb 9 6 oz), SpO2 99 %, not currently breastfeeding , Body mass index is 26 09 kg/m² ,   Orthostatic Blood Pressures      Most Recent Value   Blood Pressure  106/73 filed at 04/29/2021 0700   Patient Position - Orthostatic VS  Lying filed at 04/29/2021 0990         Systolic (33NEQ), HEN:55 , Min:80 , ZMM:656     Diastolic (37KCD), OZS:92, Min:48, Max:73    Wt Readings from Last 5 Encounters:   04/29/21 60 6 kg (133 lb 9 6 oz)   04/12/21 60 5 kg (133 lb 4 3 oz)   04/09/21 60 8 kg (134 lb)   04/07/21 61 kg (134 lb 7 7 oz)   03/02/21 64 3 kg (141 lb 12 8 oz)     I/O       04/24 0701 - 04/25 0700 04/25 0701 - 04/26 0700 04/26 0701 - 04/27 0700    P  O   160 180    I V  (mL/kg)  74 3 (1 2)     IV Piggyback  100     Total Intake(mL/kg)  334 3 (5 6) 180 (3)    Urine (mL/kg/hr)  100 (0 1)     Stool  0     Total Output  100     Net  +234 3 +180           Unmeasured Urine Occurrence  3 x     Unmeasured Stool Occurrence  1 x           Physical Exam  Vitals signs and nursing note reviewed  Constitutional:       General: She is not in acute distress  Appearance: Normal appearance  She is well-developed  She is not ill-appearing  HENT:      Head: Normocephalic and atraumatic  Nose: No congestion  Eyes:      General: No scleral icterus  Conjunctiva/sclera: Conjunctivae normal    Neck:      Musculoskeletal: Neck supple  Vascular: No carotid bruit or JVD  Cardiovascular:      Rate and Rhythm: Tachycardia present  Rhythm irregular  Frequent extrasystoles are present  Pulses: Normal pulses  Heart sounds: Normal heart sounds  No murmur  No friction rub  No gallop  Pulmonary:      Effort: Pulmonary effort is normal  No respiratory distress  Breath sounds: Normal breath sounds  No rales  Abdominal:      General: There is no distension  Palpations: Abdomen is soft  Tenderness: There is no abdominal tenderness  Musculoskeletal:         General: No swelling or tenderness  Right lower leg: No edema  Left lower leg: No edema  Skin:     General: Skin is warm  Neurological:      General: No focal deficit present  Mental Status: She is alert and oriented to person, place, and time  Mental status is at baseline  Psychiatric:         Mood and Affect: Mood normal          Behavior: Behavior normal          Thought Content:  Thought content normal            Laboratory Results: personally reviewed  Results from last 7 days   Lab Units 04/25/21  1322 04/25/21  1030 04/25/21  0702   TROPONIN I ng/mL 0 03 0 03 0 03       CBC with diff:   Results from last 7 days   Lab Units 04/28/21  0510 04/27/21  0523 04/26/21  0526 04/25/21  0702   WBC Thousand/uL 9 30 9 50 8 40 8 30   HEMOGLOBIN g/dL 8 7* 8 7* 10 1* 10 6*   HEMATOCRIT % 28 4* 28 1* 32 3* 34 1*   MCV fL 73* 72* 73* 72*   PLATELETS Thousands/uL 339 390 403* 376   MCH pg 22 4* 22 2* 22 7* 22 5*   MCHC g/dL 30 8* 30 8* 31 2 31 3   RDW % 20 0* 20 2* 19 5* 19 9*   MPV fL 7 5* 7 6* 8 0* 7 3* CMP:  Results from last 7 days   Lab Units 04/28/21  0510 04/27/21  0523 04/26/21  0526 04/25/21  0702   POTASSIUM mmol/L 4 2 3 9 3 9 3 0*   CHLORIDE mmol/L 102 104 101 97*   CO2 mmol/L 24 27 28 33*   BUN mg/dL 9 10 8 7   CREATININE mg/dL 0 54* 0 63 0 62 0 68   CALCIUM mg/dL 7 4* 7 5* 8 1* 8 5*   AST U/L 10*  --   --   --    ALT U/L 11  --   --   --    ALK PHOS U/L 52*  --   --   --    EGFR ml/min/1 73sq m 88 83 84 81         BMP:  Results from last 7 days   Lab Units 04/28/21  0510 04/27/21  0523 04/26/21  0526 04/25/21  0702   POTASSIUM mmol/L 4 2 3 9 3 9 3 0*   CHLORIDE mmol/L 102 104 101 97*   CO2 mmol/L 24 27 28 33*   BUN mg/dL 9 10 8 7   CREATININE mg/dL 0 54* 0 63 0 62 0 68   CALCIUM mg/dL 7 4* 7 5* 8 1* 8 5*       BNP:   No results for input(s): BNP in the last 72 hours      Magnesium:   Results from last 7 days   Lab Units 04/28/21  0510   MAGNESIUM mg/dL 2 4       Coags:   Results from last 7 days   Lab Units 04/25/21  0702   INR  1 10       TSH:        Hemoglobin A1C       Lipid Profile:       Meds/Allergies   all current active meds have been reviewed and current meds:   Current Facility-Administered Medications   Medication Dose Route Frequency    acetaminophen (TYLENOL) tablet 650 mg  650 mg Oral Q4H PRN    albuterol inhalation solution 2 5 mg  2 5 mg Nebulization Q4H PRN    [START ON 5/1/2021] alendronate (FOSAMAX) tablet 70 mg  70 mg Oral Weekly Before Breakfast    amiodarone tablet 200 mg  200 mg Oral BID With Meals    Followed by   Ane Half ON 5/8/2021] amiodarone tablet 200 mg  200 mg Oral Daily With Breakfast    apixaban (ELIQUIS) tablet 2 5 mg  2 5 mg Oral BID    budesonide-formoterol (SYMBICORT) 160-4 5 mcg/act inhaler 2 puff  2 puff Inhalation BID    calcium carbonate-vitamin D (OSCAL-D) 500 mg-200 units per tablet 1 tablet  1 tablet Oral Daily With Breakfast    diltiazem (CARDIZEM) tablet 30 mg  30 mg Oral Q6H [facility]    gabapentin (NEURONTIN) capsule 300 mg  300 mg Oral TID    glycerin-hypromellose- (ARTIFICIAL TEARS) ophthalmic solution 2 drop  2 drop Both Eyes Q3H PRN    HYDROcodone-acetaminophen (NORCO) 5-325 mg per tablet 1 tablet  1 tablet Oral Q6H PRN    hydrOXYzine HCL (ATARAX) tablet 25 mg  25 mg Oral Q6H PRN    insulin glargine (LANTUS) subcutaneous injection 10 Units 0 1 mL  10 Units Subcutaneous Q12H [address]    insulin lispro (HumaLOG) 100 units/mL subcutaneous injection 1-5 Units  1-5 Units Subcutaneous TID AC    insulin lispro (HumaLOG) 100 units/mL subcutaneous injection 1-5 Units  1-5 Units Subcutaneous HS    iron polysaccharides (FERREX) capsule 150 mg  150 mg Oral Daily    levalbuterol (XOPENEX) inhalation solution 1 25 mg  1 25 mg Nebulization TID    And    sodium chloride 0 9 % inhalation solution 3 mL  3 mL Nebulization TID    lidocaine (LIDODERM) 5 % patch 1 patch  1 patch Topical Daily    magnesium oxide (MAG-OX) tablet 400 mg  400 mg Oral BID    menthol-methyl salicylate (BENGAY) 79-79 % cream   Apply externally 4x Daily PRN    methocarbamol (ROBAXIN) tablet 500 mg  500 mg Oral Q8H [address]    metoprolol (LOPRESSOR) injection 5 mg  5 mg Intravenous Q6H PRN    metoprolol tartrate (LOPRESSOR) tablet 25 mg  25 mg Oral Q6H    montelukast (SINGULAIR) tablet 10 mg  10 mg Oral HS    pantoprazole (PROTONIX) EC tablet 40 mg  40 mg Oral BID    polyethylene glycol (MIRALAX) packet 17 g  17 g Oral Daily    potassium chloride (K-DUR,KLOR-CON) CR tablet 40 mEq  40 mEq Oral Daily    predniSONE tablet 5 mg  5 mg Oral BID With Meals    simethicone (MYLICON) chewable tablet 80 mg  80 mg Oral Q6H PRN    theophylline (CORTNEY-24) 24 hr capsule 200 mg  200 mg Oral Daily    tiotropium (SPIRIVA) capsule for inhaler 18 mcg  18 mcg Inhalation Daily     Medications Prior to Admission   Medication    acetaminophen (TYLENOL) 325 mg tablet    alendronate (FOSAMAX) 70 mg tablet    apixaban (ELIQUIS) 5 mg    Artificial Tear Solution (GENTEAL TEARS) 0 1-0 2-0 3 % SOLN    budesonide-formoterol (Symbicort) 160-4 5 mcg/act inhaler    Calcium Carb-Cholecalciferol (CALCIUM 1000 + D PO)    cholecalciferol (VITAMIN D3) 400 units tablet    fluticasone-umeclidinium-vilanterol (Trelegy Ellipta) 100-62 5-25 MCG/INH inhaler    furosemide (LASIX) 20 mg tablet    gabapentin (NEURONTIN) 300 mg capsule    HYDROcodone-acetaminophen (NORCO) 5-325 mg per tablet    hydrOXYzine HCL (ATARAX) 25 mg tablet    ipratropium-albuterol (DUO-NEB) 0 5-2 5 mg/3 mL nebulizer solution    iron polysaccharides (Ferrex 150) 150 mg capsule    lidocaine (LIDODERM) 5 %    magnesium oxide (MAG-OX) 400 mg    menthol-methyl salicylate (BENGAY) 72-02 % cream    metFORMIN (GLUCOPHAGE) 500 mg tablet    methocarbamol (ROBAXIN) 500 mg tablet    metoprolol tartrate (LOPRESSOR) 25 mg tablet    montelukast (SINGULAIR) 10 mg tablet    OXYGEN-HELIUM IN    pantoprazole (PROTONIX) 40 mg tablet    potassium chloride (K-DUR,KLOR-CON) 20 mEq tablet    predniSONE 5 mg tablet    psyllium (METAMUCIL) packet    simethicone (MYLICON) 80 mg chewable tablet    sitaGLIPtin (JANUVIA) 50 mg tablet    theophylline (CORTNEY-24) 200 MG 24 hr capsule    polyethylene glycol (MIRALAX) 17 g packet            Cardiac testing: reviewed  Results for orders placed during the hospital encounter of 21   Echo complete with contrast if indicated    [place] [id number]    Transthoracic Echocardiogram  2D, M-mode, Doppler, and Color Doppler    Study date:  2021    Patient: Sandra Hsieh  MR number: [id number]  Account number: [de-identified]  : 1935  Age: 82 years  Gender: Female  Status: Outpatient  Location: [facility] [place]  Height: 60 in  Weight: 144 lb  BP: 116/ 78 mmHg    Indications: CHF      Diagnoses: I50 9 - Heart failure, unspecified    Sonographer:  Sander Colmenares RDCS  Referring Physician:  LEEROY Lagos  Group: [id number]  Interpreting Physician:  Ann Marie Solis MD    SUMMARY    LEFT VENTRICLE:  Systolic function was normal  Ejection fraction was estimated to be 60 %  There were no regional wall motion abnormalities  There was mild concentric hypertrophy  Doppler parameters were consistent with abnormal left ventricular relaxation (grade 1 diastolic dysfunction)  LEFT ATRIUM:  The atrium was dilated  MITRAL VALVE:  There was moderate annular calcification  AORTIC VALVE:  The valve was trileaflet  Leaflets exhibited moderate calcification and normal cuspal separation  There was mild to moderate regurgitation  AORTA:  There was mild dilatation of the ascending aorta to 3 9 cm  HISTORY: Dyspnea  PRIOR HISTORY: Congestive heart failure  Atrial fibrillation  Chronic lung disease  PROCEDURE: The study was performed in the [facility] [place]  This was a routine study  The transthoracic approach was used  The study included complete 2D imaging, M-mode, complete spectral Doppler, and color Doppler  The  heart rate was 117 bpm, at the start of the study  Images were obtained from the parasternal, apical, subcostal, and suprasternal notch acoustic windows  Echocardiographic views were limited due to restricted patient mobility, poor  acoustic window availability, and lung interference  This was a technically difficult study  LEFT VENTRICLE: Size was normal  Systolic function was normal  Ejection fraction was estimated to be 60 %  There were no regional wall motion abnormalities  Wall thickness was normal  There was mild concentric hypertrophy  No evidence of  apical thrombus  DOPPLER: Doppler parameters were consistent with abnormal left ventricular relaxation (grade 1 diastolic dysfunction)  RIGHT VENTRICLE: The size was normal  Systolic function was normal  Wall thickness was normal     LEFT ATRIUM: The atrium was dilated      RIGHT ATRIUM: Size was normal     MITRAL VALVE: There was moderate annular calcification  Valve structure was normal  There was normal leaflet separation  DOPPLER: The transmitral velocity was within the normal range  There was no evidence for stenosis  There was no  significant regurgitation  AORTIC VALVE: The valve was trileaflet  Leaflets exhibited moderate calcification and normal cuspal separation  DOPPLER: Transaortic velocity was within the normal range  There was no evidence for stenosis  There was mild to moderate  regurgitation  TRICUSPID VALVE: The valve structure was normal  There was normal leaflet separation  DOPPLER: The transtricuspid velocity was within the normal range  There was no evidence for stenosis  There was no significant regurgitation  PULMONIC VALVE: Leaflets exhibited normal thickness, no calcification, and normal cuspal separation  DOPPLER: The transpulmonic velocity was within the normal range  There was no significant regurgitation  PERICARDIUM: There was no pericardial effusion  The pericardium was normal in appearance  AORTA: The root exhibited normal size  There was mild dilatation of the ascending aorta to 3 9 cm  SYSTEMIC VEINS: IVC: The inferior vena cava was normal in size  SYSTEM MEASUREMENT TABLES    2D  %FS: 27 91 %  Ao Diam: 3 4 cm  Ao asc: 3 89 cm  EDV(Teich): 83 81 ml  EF(Teich): 54 34 %  ESV(Teich): 38 27 ml  IVSd: 1 19 cm  LA Area: 15 85 cm2  LA Diam: 2 91 cm  LVEDV MOD A4C: 81 33 ml  LVEF MOD A4C: 50 07 %  LVESV MOD A4C: 40 61 ml  LVIDd: 4 32 cm  LVIDs: 3 11 cm  LVLd A4C: 6 23 cm  LVLs A4C: 5 45 cm  LVOT Diam: 2 cm  LVPWd: 1 06 cm  RA Area: 12 39 cm2  RVIDd: 2 31 cm  SV MOD A4C: 40 72 ml  SV(Teich): 45 54 ml    CW  AR Dec [place]: 1 18 m/s2  AR Dec Time: 3103 32 ms  AR PHT: 899 96 ms  AR Vmax: 3 67 m/s  AR maxP 86 mmHg  AV Env  Ti: 230 89 ms  AV VTI: 20 79 cm  AV Vmax: 1 24 m/s  AV Vmean: 0 9 m/s  AV maxP 19 mmHg  AV meanPG: 3 51 mmHg  MV PHT: 64 68 ms  MV VTI: 38 89 cm  MV Vmax: 1 31 m/s  MV Vmean: 0 84 m/s  MV maxP 9 mmHg  MV meanPG: 3 11 mmHg  MVA By PHT: 3 4 cm2  TR Vmax: 2 78 m/s  TR maxP 87 mmHg    MM  TAPSE: 1 24 cm    PW  BONY (VTI): 2 5 cm2  BONY Vmax: 2 16 cm2  LVOT Env  Ti: 304 47 ms  LVOT VTI: 16 63 cm  LVOT Vmax: 0 86 m/s  LVOT Vmean: 0 55 m/s  LVOT maxP 95 mmHg  LVOT meanP 36 mmHg  LVSV Dopp: 51 96 ml  MVA (VTI): 1 34 cm2    [facility]    Prepared and electronically signed by    Rodney Yuan MD  Signed [id number] 16:26:39       No results found for this or any previous visit  No results found for this or any previous visit  No results found for this or any previous visit

## 2021-04-30 NOTE — TELEPHONE ENCOUNTER
Pt needs rx foripratropium-albuterol (DUO-NEB) 0 5-2 5 mg/3 mL nebulizer solution, pls send to [facility]

## 2021-05-02 NOTE — PROGRESS NOTES
Admission Report at [facility] has admitted your patient to [address] [place] [address] service with the following disciplines:      SN, PT and OT  This report is informational only, no responses is needed  Primary focus of home health care cardiac   afib  Patient stated goals of care to be able to feel better  Anticipated visit pattern and next visit date 2w3,1w3 5 4  Significant clinical findings low BP 80/58  Request for additional disciplines   Request for medication clarification   Request for other order clarification   Needs follow up physician appointments scheduled   Potential barriers to goal achievement     Other pertinent information   multiple med warnings    1  Duplicate therapy between symbicort and trelegy  2  Level 2 interaction between hydroxyzine and amiodarone  3  Duplicate therapy between calcium plus D and cholecalciferol  4  Level 2 interaction between amiodarone and diltiazem  5  Duplicate therapy between acetaminophen and Norco  6  Allergy interaction with trelegy   the patient has allergy to fluticasone    Thank you for allowing us to participate in the care of your patient        Tony Lund

## 2021-05-03 NOTE — TELEPHONE ENCOUNTER
I called pt to schedule MARIBEL  Pt was admitted to [facility]  on 4/25/21, d/c- 4/29/21, dx- atrial fibrilllation  Pt can't come to office d/t being to weak  It will be a phone call visit

## 2021-05-04 NOTE — ASSESSMENT & PLAN NOTE
Wt Readings from Last 3 Encounters:   05/04/21 60 3 kg (133 lb)   04/29/21 60 6 kg (133 lb 9 6 oz)   04/12/21 60 5 kg (133 lb 4 3 oz)     · Continue with amiodarone, cardizem

## 2021-05-04 NOTE — ASSESSMENT & PLAN NOTE
· 4/25/2021 to 4/29/2021 she was admitted to [address] [place] [address] due to shortness of breath and not feeling right  The patient reports that she had 3 episodes of non bloody bowel movements which is not normal for her  In the ER the patient was found to be in rapid atrial fibrillation heart rate as high as 140s  She received 1 dose of Cardizem 15 mg IV with much improvement and  Initiated on Cardizem drip  She was admitted into the ICU  · A cardiology consultation was obtained  Patient was placed on a Cardizem drip  Despite being on the Cardizem drip, she had no significant improvement in her rate control  After receiving about a day and a half to almost 2 days worth of IV Cardizem, she was transitioned over to an IV amiodarone drip  Her anti arrhythmic medication regiment was adjusted many times  Ultimately, her rate was much better controlled on 04/29/2021  Medication changes were made  Additionally, her Eliquis dosing was reduced from 5 mg twice a day to 2 5 mg twice a day

## 2021-05-04 NOTE — PROGRESS NOTES
Assessment/Plan:     Type 2 diabetes mellitus without complication, without long-term current use of insulin ([facility])    Lab Results   Component Value Date    HGBA1C 12 6 (A) 04/09/2021   · continue current medications    Chronic respiratory failure with hypoxia (HCC)  · Continue with nebs and inhaler    Paroxysmal atrial fibrillation (HCC)  · Is trying to take the amiodarone    Chronic diastolic CHF (congestive heart failure) (HCC)  Wt Readings from Last 3 Encounters:   05/04/21 60 3 kg (133 lb)   04/29/21 60 6 kg (133 lb 9 6 oz)   04/12/21 60 5 kg (133 lb 4 3 oz)     · Continue with amiodarone, cardizem        Atrial fibrillation with rapid ventricular response ([facility] [address] )  · Continue eliquis    History of recent hospitalization  · 4/25/2021 to 4/29/2021 she was admitted to [address] [place] [address] due to shortness of breath and not feeling right  The patient reports that she had 3 episodes of non bloody bowel movements which is not normal for her  In the ER the patient was found to be in rapid atrial fibrillation heart rate as high as 140s  She received 1 dose of Cardizem 15 mg IV with much improvement and  Initiated on Cardizem drip  She was admitted into the ICU  · A cardiology consultation was obtained  Patient was placed on a Cardizem drip  Despite being on the Cardizem drip, she had no significant improvement in her rate control  After receiving about a day and a half to almost 2 days worth of IV Cardizem, she was transitioned over to an IV amiodarone drip  Her anti arrhythmic medication regiment was adjusted many times  Ultimately, her rate was much better controlled on 04/29/2021  Medication changes were made  Additionally, her Eliquis dosing was reduced from 5 mg twice a day to 2 5 mg twice a day           Diagnoses and all orders for this visit:    Type 2 diabetes mellitus without complication, without long-term current use of insulin ([facility])  -     Microalbumin / creatinine urine ratio    Atrial fibrillation with rapid ventricular response (HCC)  -     amiodarone 200 mg tablet; Take 1 tablet (200 mg total) by mouth 2 (two) times a day with meals for 17 doses  -     metoprolol succinate (TOPROL-XL) 25 mg 24 hr tablet; Take 3 tablets (75 mg total) by mouth every morning  -     metoprolol succinate (TOPROL-XL) 50 mg 24 hr tablet; Take 1 tablet (50 mg total) by mouth daily at bedtime    Gastroesophageal reflux disease without esophagitis  -     magnesium oxide (MAG-OX) 400 mg; Take 1 tablet (400 mg total) by mouth 2 (two) times a day    Functional diarrhea  -     magnesium oxide (MAG-OX) 400 mg; Take 1 tablet (400 mg total) by mouth 2 (two) times a day    Chronic respiratory failure with hypoxia (HCC)    Paroxysmal atrial fibrillation (HCC)    Chronic diastolic CHF (congestive heart failure) (HCC)    Other iron deficiency anemia    Chronic anxiety    Chronic bilateral low back pain without sciatica    History of recent hospitalization         Subjective:     Patient ID: Mirta Bull is a 82 y o  female  4/12/2021 to 4/14/2021 in [address] [place] [address] with back pain and ambulatory dysfunction  Patient was seen by physical therapy who recommended inpatient rehab  Patient refused to go to rehab and states that she wants to go home  Case management arranged for home care  Imaging studies of the lumbar spine showed compression fractures which appear similar to previous imaging  No neurologic complaints  Patient on home oxygen requirements  Patient discharged with home care  4/19/2021 patient admitted to the [address] for rehab  She states she was isolated in her room and not given any type of physical therapy  She stated that she did not feel well on 4/25/2021 and was discharged from the [facility] and sent to the ED     4/25/2021 to 4/29/2021 she was admitted to [address] [place] [address] due to shortness of breath and not feeling right  The patient reports that she had 3 episodes of non bloody bowel movements which is not normal for her     In the ER the patient was found to be in rapid atrial fibrillation heart rate as high as 140s  She received 1 dose of Cardizem 15 mg IV with much improvement and  Initiated on Cardizem drip  She was admitted into the ICU  A cardiology consultation was obtained  Patient was placed on a Cardizem drip  Despite being on the Cardizem drip, she had no significant improvement in her rate control  After receiving about a day and a half to almost 2 days worth of IV Cardizem, she was transitioned over to an IV amiodarone drip  Her anti arrhythmic medication regiment was adjusted many times  Ultimately, her rate was much better controlled on 04/29/2021  Medication changes were made  Additionally, her Eliquis dosing was reduced from 5 mg twice a day to 2 5 mg twice a day  Patient was deemed medically stable for discharge on 04/29/2021  She was sent home with visiting nurses  Review of Systems   Constitutional: Positive for activity change and fatigue  Negative for appetite change, chills and fever  HENT: Negative for congestion, ear pain, postnasal drip, rhinorrhea, sinus pressure, sinus pain, sore throat and trouble swallowing  Eyes: Negative for pain  Respiratory: Positive for shortness of breath  Negative for cough and chest tightness  Cardiovascular: Negative for chest pain, palpitations and leg swelling  Gastrointestinal: Negative for abdominal pain, constipation, diarrhea, nausea and vomiting  Genitourinary: Negative for difficulty urinating, flank pain, frequency and urgency  Musculoskeletal: Positive for back pain, gait problem and myalgias  Negative for joint swelling  Skin: Negative for color change  Neurological: Negative for dizziness, weakness, light-headedness and headaches  Psychiatric/Behavioral: Negative for sleep disturbance  The patient is not nervous/anxious  Objective:     Physical Exam  Constitutional:       General: She is awake        Appearance: Normal appearance  She is well-developed  HENT:      Head: Normocephalic  Nose: Nose normal       Mouth/Throat:      Mouth: Mucous membranes are moist    Eyes:      Extraocular Movements: Extraocular movements intact  Neck:      Musculoskeletal: Normal range of motion  Cardiovascular:      Rate and Rhythm: Normal rate  Pulmonary:      Effort: Pulmonary effort is normal       Comments: Oxygen therapy  Abdominal:      Palpations: Abdomen is soft  Musculoskeletal:      Comments: Muscle weakness, unable to use roller walker due to thick carpet  Skin:     General: Skin is warm and dry  Neurological:      Mental Status: She is alert and oriented to person, place, and time  Psychiatric:         Attention and Perception: Attention normal          Mood and Affect: Mood normal          Speech: Speech normal          Behavior: Behavior normal  Behavior is cooperative  Vitals:    05/04/21 1417   Weight: 60 3 kg (133 lb)   Height: 5' (1 524 m)       Transitional Care Management Review:  Carmela Horn is a 82 y o  female here for TCM follow up  During the TCM phone call patient stated:    TCM Call (since 4/3/2021)     Date and time call was made  4/15/2021 12:54 PM    Hospital care reviewed  Records reviewed    Patient was hospitialized at  [address]        Date of Admission  04/12/21    Date of discharge  04/14/21    Diagnosis  ambulatory dysfunction     Disposition  Home; Home health services    Were the patients medications reviewed and updated  Yes    Current Symptoms  None    Weakness severity  Mild      TCM Call (since 4/3/2021)     Post hospital issues  None    Should patient be enrolled in anticoag monitoring? No    Scheduled for follow up?   Yes    Did you obtain your prescribed medications  Yes    Do you need help managing your prescriptions or medications  No    Is transportation to your appointment needed  No    I have advised the patient to call PCP with any new or worsening symptoms  destiny messina          [employer], LE[place]

## 2021-05-05 NOTE — PROGRESS NOTES
Outpatient Care Management Note:  In basket BPCI referral received, -cardiac arrhythmia  Chart review completed

## 2021-05-07 NOTE — PROGRESS NOTES
Outpatient Care Management Note:  Outreach call placed to Dioni Padron  Introduced myself and my role  She is agreeable to outreach at this time  Grace's biggest issue at this time is her back pain  Dioni Padron states that her heart has not felt like it was racing since she was discharged  She was contacted this morning about home assistance  She is unsure who the call came from but they discussed cleaning and transportation services  She is excite about possibly receiving these services  She has been unable to see her son for 9 nine months, he is in a facility and has not been able to have visitors due to [place]  They were mailing her paperwork to fill out  Discussed the availability of a CHW to assist with applications if needed  She is agreeable to same  She was unable to provide me the number of the person who called due to her poor vision  Her neighbor is filling her pill box and she is taking all medications that were ordered  She is concerned about ongoing cost as 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 three 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  Advised that if she qualified for services, she would also qualify for MA which would make her medications less expensive also  Dioni Padron is upset that she was not able to return to the [place] to complete her STR  Emotional support given  She is active with [facility] and PT is supposed to see her today  Denies any other needs at this time  Call placed to [facility]  Kurt Okeefe is also unsure exactly who called Dioni Padron but they also called her and she was not able to provide the information they requested  She was able to give me the number  Call placed to [facility]  Message left requesting a return call to determine if they needed the assistance of our CHW to complete documentation  Call placed to DOMINGA LOPEZ  They initiated a referral to [facility] when Dioni Padron was discharged       Call placed to [facility] Olivier Gaitan)  The compass application has been completed and today's phone call was the assessment portion  At this time they do not need any assistance, the next steps will be financial paperwork  Farhana Ryna will call Matilde Bray and let her know that we discussed the services that were already in process and there would be no need for Stef Acuña to call her  Provided my contact information should they need assistance as they do not go into their clients home

## 2021-05-13 NOTE — PROGRESS NOTES
Outpatient Care Management Note:  Follow up call placed to [address]  She questioned if Dr Claudeen Cullens filled out the form yet  Advised her that the office MA informed me that they did not receive it  I also made her aware that I called the state and asked that a new one be faxed  Per [address], she was told they had the form but he had not filled it out yet  Apologized for the confusion and re-assured her that a new form was being faxed in case they have not received it  Also made her aware that I stressed the importance of it being filled out and returned ASAP  Dioni Padron is appreciative of th effort  Dioni Padron feels that her breathing is at her baseline  She continues to have back pain as well as numbness and tingling in her lower extremities  Dioni Padron is scheduled for a follow up appointment with her PCP on 5/21/2021  She is questioning how long it will take for her services to start once her PCP fills out the form  Advised her that I could not answer that question as I was not involved in the process but that it can take several months  She is hopeful that it will not take that long  Denies any needs at this time  Encouraged to call with any questions or concerns

## 2021-05-13 NOTE — PROGRESS NOTES
Outpatient Care Management Note:  Rosa Cummings called the office checking on the status of the physician certification form  Office staff has not received the form at this time  Call placed to [facility], the form was faxed to central faxing number  They have Dr Jeevan Callahan listed as her PCP  Provided direct fax number  They will re-fax the form to that number

## 2021-05-20 NOTE — TELEPHONE ENCOUNTER
Patient   [phone]  Dr Soledad Villanueva     Patient is calling in stating that she needs the oxycodone because the medication that she is using is not working  Her pain level is a 8-9/10 all in the middle of the back  [id number]   - [address], [address]

## 2021-05-20 NOTE — TELEPHONE ENCOUNTER
--NEENA--    S/W pt  Pt stated the norco is not working  Pt stated she needs the oxycodone  Advised her from ALYCE's SOV[facility] note from 4/9/21 "She states that at this point her PCP will take over prescriptions that manage her pain  I will make a notation in the chart that we will no longer be providing patient with opiate medications "  Advised pt to call her PCP  Pt stated she was in ICU recently  Pt verbalized understanding

## 2021-05-21 PROBLEM — Z09 FOLLOW-UP EXAMINATION: Status: ACTIVE | Noted: 2021-01-01

## 2021-05-21 NOTE — ASSESSMENT & PLAN NOTE
· Patient with improving back pain  · Had been working with PT OT  · Currently not using roller walker or cane as patient has improved  · Medications discussed  · Labs discussed  · Pain control discussed  · Transportation document discussed

## 2021-05-21 NOTE — PROGRESS NOTES
Virtual Regular Visit      Assessment/Plan:    Problem List Items Addressed This Visit        Other    Follow-up examination - Primary     · Patient with improving back pain  · Had been working with PT OT  · Currently not using roller walker or cane as patient has improved  · Medications discussed  · Labs discussed  · Pain control discussed  · Transportation document discussed                 [facility] Plan of Care: Home safety education provided  Reason for visit is   Chief Complaint   Patient presents with    Follow-up     virtual [id number] labs done in hospital pt states she is feeling better        Encounter provider LEEROY Ellis    Provider located at [address]  [address] [place] [phone]      Recent Visits  Date Type Provider Dept   05/17/21 Telephone Radha Honeycutt, [address]  [facility]  recent visits within past 7 days and meeting all other requirements     Today's Visits  Date Type Provider Dept   05/21/21 Telemedicine LEEROY Irene  [place] today's visits and meeting all other requirements     Future Appointments  No visits were found meeting these conditions  Showing future appointments within next 150 days and meeting all other requirements        The patient was identified by name and date of birth  Mirta Bull was informed that this is a telemedicine visit and that the visit is being conducted through [employer] and patient was informed that this is not a secure, HIPAA-compliant platform  She agrees to proceed     My office door was closed  No one else was in the room  She acknowledged consent and understanding of privacy and security of the video platform  The patient has agreed to participate and understands they can discontinue the visit at any time  Patient is aware this is a billable service       Subjective  Mirta Bull is a 82 y o  female       Patient is being seen today via virtual phone call as a follow-up  We are discussing her current medication regimen, laboratory data she has had, physical therapy occupational therapy exercises  We also discussed how she has improved since doing the PT OT exercises  She is currently not using a roller walker or single-point cane for ambulation  She states that she does continue to use her oxygen at home  Patient is more upbeat and seems that she is improving  She did get confirmation that she will have transportation available for her, this has made her extremely happy  Past Medical History:   Diagnosis Date    Allergies     Asthma     Chronic diastolic CHF (congestive heart failure) (HCC)     Colitis     Colon polyp     COPD (chronic obstructive pulmonary disease)     Diverticulosis     DJD (degenerative joint disease)     Gait abnormality     Hypertension     Paroxysmal atrial fibrillation (HCC)        Past Surgical History:   Procedure Laterality Date    BLADDER SURGERY      COLON SURGERY      COLONOSCOPY      COLONOSCOPY W/ POLYPECTOMY N/A 1/21/2019    Procedure: COLONOSCOPY W/ POLYPECTOMY;  Surgeon: Darion Marcial MD;  Location: [address] [place] [address] GI LAB; Service: General    FL GUIDED NEEDLE PLAC BX/ASP/INJ  9/2/2020    FL GUIDED NEEDLE PLAC BX/ASP/INJ  1/21/2021    GASTROJEJUNOSTOMY W/ JEJUNOSTOMY TUBE N/A 2/3/2020    Procedure: Antrectomy with bilroth II Anastomosis;   Surgeon: Victor Hugo Montoya MD;  Location:  MAIN OR;  Service: Evalina Eric JOINT Right 9/2/2020    Procedure: BLOCK / INJECTION SACROILIAC;  Surgeon: Juan Lazcano MD;  Location: [facility];  Service: Pain Management     WV INJECTION,SACROILIAC JOINT Right 1/21/2021    Procedure: RIGHT SACROILIAC JOINT INJECTION;  Surgeon: Juan Lazcano MD;  Location: [facility];  Service: Pain Management     SMALL INTESTINE SURGERY  03/2020    with partial gastrectomy       Current Outpatient Medications   Medication Sig Dispense Refill    acetaminophen (TYLENOL) 325 mg tablet Take 650 mg by mouth every 4 (four) hours as needed for mild pain      alendronate (FOSAMAX) 70 mg tablet take 1 tablet by mouth every 7 days 4 tablet 5    amiodarone 200 mg tablet Take 1 tablet (200 mg total) by mouth 2 (two) times a day with meals for 17 doses 17 tablet 0    apixaban (ELIQUIS) 2 5 mg Take 1 tablet (2 5 mg total) by mouth 2 (two) times a day 60 tablet 0    Artificial Tear Solution (GENTEAL TEARS) 0 1-0 2-0 3 % SOLN Administer 1 drop to both eyes 3 (three) times a day      budesonide-formoterol (Symbicort) 160-4 5 mcg/act inhaler Inhale 2 puffs 2 (two) times a day      Calcium Carb-Cholecalciferol (CALCIUM 1000 + D PO) Take 1,000 mg by mouth daily      cholecalciferol (VITAMIN D3) 400 units tablet Take 400 Units by mouth daily      diltiazem (CARDIZEM CD) 120 mg 24 hr capsule Take 1 capsule (120 mg total) by mouth daily 30 capsule 0    fluticasone-umeclidinium-vilanterol (Trelegy Ellipta) 100-62 5-25 MCG/INH inhaler Inhale 1 puff daily Rinse mouth after use   3 Inhaler 1    furosemide (LASIX) 20 mg tablet take 2 tablets by mouth twice a day 60 tablet 5    hydrOXYzine HCL (ATARAX) 25 mg tablet Take 1 tablet (25 mg total) by mouth every 6 (six) hours as needed for anxiety 90 tablet 0    ipratropium-albuterol (DUO-NEB) 0 5-2 5 mg/3 mL nebulizer solution Take 1 vial (3 mL total) by nebulization every 6 (six) hours while awake 300 mL 0    iron polysaccharides (Ferrex 150) 150 mg capsule Take 1 capsule (150 mg total) by mouth daily 60 capsule 5    magnesium oxide (MAG-OX) 400 mg Take 1 tablet (400 mg total) by mouth 2 (two) times a day 60 tablet 5    menthol-methyl salicylate (BENGAY) 74-15 % cream Apply topically 4 (four) times a day as needed (back pain) 85 g 0    metFORMIN (GLUCOPHAGE) 500 mg tablet Take 1 tablet (500 mg total) by mouth 2 (two) times a day with meals 60 tablet 3    metoprolol succinate (TOPROL-XL) 25 mg 24 hr tablet Take 3 tablets (75 mg total) by mouth every morning (Patient taking differently: Take 25 mg by mouth every morning ) 90 tablet 0    metoprolol succinate (TOPROL-XL) 50 mg 24 hr tablet Take 1 tablet (50 mg total) by mouth daily at bedtime 30 tablet 0    montelukast (SINGULAIR) 10 mg tablet Take 1 tablet (10 mg total) by mouth daily at bedtime 90 tablet 1    OXYGEN-HELIUM IN Inhale      pantoprazole (PROTONIX) 40 mg tablet take 1 tablet by mouth twice a day 60 tablet 5    predniSONE 5 mg tablet Take 1 tablet (5 mg total) by mouth 2 (two) times a day with meals Twice daily 60 tablet 5    psyllium (METAMUCIL) packet Take 1 packet by mouth 3 (three) times a day 100 packet 1    sitaGLIPtin (JANUVIA) 50 mg tablet Take 1 tablet (50 mg total) by mouth daily 30 tablet 5    theophylline (CORTNEY-24) 200 MG 24 hr capsule Take 1 capsule (200 mg total) by mouth daily 30 capsule 0    amiodarone 200 mg tablet Take 1 tablet (200 mg total) by mouth daily with breakfast (Patient not taking: Reported on 5/13/2021) 30 tablet 0    gabapentin (NEURONTIN) 300 mg capsule Take 1 capsule (300 mg total) by mouth 3 (three) times a day 90 capsule 0    HYDROcodone-acetaminophen (NORCO) 5-325 mg per tablet Take 1 tablet by mouth every 6 (six) hours as needed for painMax Daily Amount: 4 tablets (Patient not taking: Reported on 5/7/2021) 90 tablet 0    lidocaine (LIDODERM) 5 % Apply 1 patch topically daily Remove & Discard patch within 12 hours or as directed by MD 30 patch 0    methocarbamol (ROBAXIN) 500 mg tablet Take 1 tablet (500 mg total) by mouth every 8 (eight) hours for 7 days (Patient taking differently: Take 500 mg by mouth 4 (four) times a day ) 21 tablet 0    polyethylene glycol (MIRALAX) 17 g packet Take 17 g by mouth daily 510 g 0    potassium chloride (K-DUR,KLOR-CON) 20 mEq tablet Take 2 tablets (40 mEq total) by mouth daily (Patient taking differently: Take 40 mEq by mouth 2 (two) times a day ) 60 tablet 0    simethicone (MYLICON) 80 mg chewable tablet Chew 1 tablet (80 mg total) every 6 (six) hours as needed for flatulence (Patient not taking: Reported on 5/4/2021) 30 tablet 0     No current facility-administered medications for this visit  Allergies   Allergen Reactions    Bee Venom Shortness Of Breath    Fluticasone      Per pt  error       Review of Systems   Constitutional: Negative for activity change, appetite change, chills, fatigue and fever  HENT: Negative for congestion, ear pain, postnasal drip, rhinorrhea, sinus pressure, sinus pain, sore throat and trouble swallowing  Eyes: Negative for pain  Respiratory: Negative for cough, chest tightness and shortness of breath  Cardiovascular: Negative for chest pain, palpitations and leg swelling  Gastrointestinal: Negative for abdominal pain, constipation, diarrhea, nausea and vomiting  Genitourinary: Negative for difficulty urinating, flank pain, frequency and urgency  Musculoskeletal: Negative for back pain, joint swelling and myalgias  Skin: Negative for color change  Neurological: Negative for dizziness, weakness, light-headedness and headaches  Psychiatric/Behavioral: Negative for sleep disturbance  The patient is not nervous/anxious  Video Exam    Vitals:    05/21/21 1017   Temp: 98 °F (36 7 °C)   SpO2: 98%   Weight: 60 3 kg (133 lb)   Height: 5' (1 524 m)       Physical Exam  Constitutional:       General: She is awake  Appearance: Normal appearance  She is well-developed  HENT:      Head: Normocephalic  Nose: Nose normal       Mouth/Throat:      Mouth: Mucous membranes are moist    Eyes:      Extraocular Movements: Extraocular movements intact  Neck:      Musculoskeletal: Normal range of motion  Cardiovascular:      Rate and Rhythm: Normal rate  Pulmonary:      Effort: Pulmonary effort is normal       Comments: On 2 L nasal cannula  Abdominal:      Palpations: Abdomen is soft     Musculoskeletal: Normal range of motion  Comments: Patient is at her baseline  She does states she has chronic lower back pain, this is improving at this time  Skin:     General: Skin is warm and dry  Neurological:      Mental Status: She is alert and oriented to person, place, and time  Psychiatric:         Attention and Perception: Attention normal          Mood and Affect: Mood normal          Speech: Speech normal          Behavior: Behavior normal  Behavior is cooperative  I spent 20 minutes directly with the patient during this visit      VIRTUAL VISIT DISCLAIMER    Bolivar Houston acknowledges that she has consented to an online visit or consultation  She understands that the online visit is based solely on information provided by her, and that, in the absence of a face-to-face physical evaluation by the physician, the diagnosis she receives is both limited and provisional in terms of accuracy and completeness  This is not intended to replace a full medical face-to-face evaluation by the physician  Bolivar Houston understands and accepts these terms

## 2021-05-27 NOTE — PROGRESS NOTES
Outpatient Care Management Note: Follow up call placed to [address]  She continues to have back pain but it is improving  She was discharged from home PT but remains active with skilled nursing  [address] is able to ambulate without an assistive device  When she is on her feet too long her back pain increases  Discussed to work on her chores slowly a little bit at a time  Verbalized understanding  She is getting together her bank records and income tax records for the waiver process  She is aware that it may take 3-6 months  She has had no episodes of chest pain, shortness of breath or rapid heart rate  She does not have a scale but is weighed weekly by the visiting nurse  No lower extremity swelling  Continues to use her oxygen at 3 liters  Reviewed medications and upcoming appointments  Her neighbor fills her medication box due to her poor eyesight  Denies any needs at this time  Encouraged to call with any questions or concerns

## 2021-06-04 NOTE — TELEPHONE ENCOUNTER
She would like a letter, stating she is partially blind, and there for unable to work, and unable to pay her phone bill   Would like it asap and faxed to [phone]

## 2021-06-10 NOTE — PROGRESS NOTES
Outpatient Care Management Note:  Follow up call placed to [address]  Her breathing is at its baseline  "I have good days and bad "  No chest pain , shortness of breath or palpitations  She remains active with visiting nurses and they were there this morning  Continues with some back discomfort and describes her walking as off balance at times  Denies any falls  She is waiting on a final determination regarding waivers  Denies any needs at this time  Encouraged to call with any questions or concerns

## 2021-06-17 NOTE — PROGRESS NOTES
Assessment/Plan:    Problem List Items Addressed This Visit        Digestive    Gastroesophageal reflux disease without esophagitis     · Continue protonix and omeprazole             Endocrine    Type 2 diabetes mellitus without complication, without long-term current use of insulin ([facility])       Lab Results   Component Value Date    HGBA1C 12 6 (A) 04/09/2021 ·   get HgBA1c lab  · Continue metformin, januvia             Respiratory    Other emphysema (HCC)     · Continue trelegy, duoneb, singulair   · Continue theophylline           Chronic respiratory failure with hypoxia ([facility])     · Continuous home oxygen             Cardiovascular and Mediastinum    Paroxysmal atrial fibrillation (HCC)     · Torpol, eliquis          Chronic diastolic CHF (congestive heart failure) ([facility])     Wt Readings from Last 3 Encounters:   05/21/21 60 3 kg (133 lb)   05/04/21 60 3 kg (133 lb)   04/29/21 60 6 kg (133 lb 9 6 oz) ·   weight:  · Continue diltiazem, lasix, toprol                 Essential hypertension     · Continue cardizem, lasix, toprol            Musculoskeletal and Integument    Osteoporosis     · Patient no longer receiving physical therapy  · Does complain of occasional back pains  · Fosamax, vitamin d, neurontin, norco, atarax, robaxin          Compression fracture of L4 vertebra ([facility])    Closed wedge compression fracture of T12 vertebra ([facility])       Other    Chronic anxiety (Chronic)     · Atarax         Macular degeneration of both eyes     · Patient does have significant decrease in sight   · She is unable to work any longer         Seasonal allergies     · Continue artificial tears, singular         Uncomplicated opioid dependence ([facility] )     · Continue norco          Ambulatory dysfunction     · Secondary to chronic back issues as above         Iron deficiency anemia     · Continue iron polysaccharides  · Patient eat an orange a day for her vitamin-C intake         Follow-up exam, less than 3 months since previous exam - Primary     · Patient is high risk for re-admissions  · Continue 4 week visits with PCP   · Continue HHC  · Consistent monitoring of current medication regimen  · Patient is 'doing well' at this time  · Will contact pharmacy for current medication list with prices as patient is now having difficulty with finances  · Pt has RN scheduled for 10:30am today, will call and discuss current medications with RN and patient                 Diagnoses and all orders for this visit:    Follow-up exam, less than 3 months since previous exam    Gastroesophageal reflux disease without esophagitis    Chronic respiratory failure with hypoxia (HCC)    Other emphysema (HCC)    Chronic diastolic CHF (congestive heart failure) (HCC)    Paroxysmal atrial fibrillation (HCC)    Osteoporosis, unspecified osteoporosis type, unspecified pathological fracture presence    Chronic anxiety    [facility]    Ambulatory dysfunction    Seasonal allergies    Macular degeneration of both eyes, unspecified type    Type 2 diabetes mellitus without complication, without long-term current use of insulin (HCC)    Essential hypertension    Compression fracture of L4 vertebra, sequela    Closed wedge compression fracture of T12 vertebra, sequela    Other iron deficiency anemia     Gastroesophageal reflux disease without esophagitis  · Continue protonix and omeprazole     Type 2 diabetes mellitus without complication, without long-term current use of insulin (HCC)    Lab Results   Component Value Date    HGBA1C 12 6 (A) 04/09/2021   · get HgBA1c lab  · Continue metformin, januvia     Chronic respiratory failure with hypoxia (HCC)  · Continuous home oxygen     Other emphysema (HCC)  · Continue trelegy, duoneb, singulair   · Continue theophylline      Chronic diastolic CHF (congestive heart failure) (HCC)  Wt Readings from Last 3 Encounters:   05/21/21 60 3 kg (133 lb)   05/04/21 60 3 kg (133 lb)   04/29/21 60 6 kg (133 lb 9 6 oz) · weight:  · Continue diltiazem, lasix, toprol            Paroxysmal atrial fibrillation (HCC)  · Torpol, eliquis     Osteoporosis  · Patient no longer receiving physical therapy  · Does complain of occasional back pains  · Fosamax, vitamin d, neurontin, norco, atarax, robaxin     Chronic anxiety  · Atarax    Uncomplicated opioid dependence (HCC)  · Continue norco     Essential hypertension  · Continue cardizem, lasix, toprol    Macular degeneration of both eyes  · Patient does have significant decrease in sight   · She is unable to work any longer    Seasonal allergies  · Continue artificial tears, singular    Ambulatory dysfunction  · Secondary to chronic back issues as above    Iron deficiency anemia  · Continue iron polysaccharides  · Patient eat an orange a day for her vitamin-C intake    Follow-up exam, less than 3 months since previous exam  · Patient is high risk for re-admissions  · Continue 4 week visits with PCP   · Continue HHC  · Consistent monitoring of current medication regimen  · Patient is 'doing well' at this time  · Will contact pharmacy for current medication list with prices as patient is now having difficulty with finances  · Pt has RN scheduled for 10:30am today, will call and discuss current medications with RN and patient  Subjective:      Patient ID: Liborio Velazquez is a 82 y o  female  She is a virtual visit today as she is high risk for re-admissions  It is imperative that she Continue 4 week visits with this PCP, and Continue HHC  With the assistance of the visiting nurses, and the pharmacy, we have maintained Consistent monitoring of current medication regimen  Patient states she is 'doing well' at this time  Will contact pharmacy for current medication list with prices as patient is now having difficulty with finances  Pt has RN scheduled for 10:30am today, will call and discuss current medications with RN and patient       All of the patient's questions and concerns were discussed during this virtual visit, to the patient's satisfaction  The following portions of the patient's history were reviewed and updated as appropriate:   Past Medical History:  She has a past medical history of Allergies, Asthma, Chronic diastolic CHF (congestive heart failure) ([facility] ), DJD (degenerative joint disease), Gait abnormality, Hypertension, and Paroxysmal [facility] fibrillation ([facility]  ,  _______________________________________________________________________  Medical Problems:  does not have any pertinent problems on file ,  _______________________________________________________________________  Past Surgical History:   has a past surgical history that includes Colon surgery; Colonoscopy w/ polypectomy (N/A, 1/21/2019); Bladder surgery; Small intestine surgery (03/2020); Gastrojejunostomy w/ jejunostomy tube (N/A, 2/3/2020); Colonoscopy; pr injection,sacroiliac joint (Right, 9/2/2020); FL guided needle plac bx/asp/inj (9/2/2020); pr injection,sacroiliac joint (Right, 1/21/2021); and FL guided needle plac bx/asp/inj (1/21/2021)  ,  _______________________________________________________________________  Family History:  family history includes Heart disease in her mother ,  _______________________________________________________________________  Social History:   reports that she quit smoking about 7 years ago  She has never used smokeless tobacco  She reports that she does not drink alcohol and does not use drugs  ,  _______________________________________________________________________  Allergies:  is allergic to bee venom and fluticasone     _______________________________________________________________________  Current Outpatient Medications   Medication Sig Dispense Refill    acetaminophen (TYLENOL) 325 mg tablet Take 650 mg by mouth every 4 (four) hours as needed for mild pain      alendronate (FOSAMAX) 70 mg tablet take 1 tablet by mouth every 7 days 4 tablet 5    apixaban (ELIQUIS) 2 5 mg Take 1 tablet (2 5 mg total) by mouth 2 (two) times a day 60 tablet 0    Artificial Tear Solution (GENTEAL TEARS) 0 1-0 2-0 3 % SOLN Administer 1 drop to both eyes 3 (three) times a day      budesonide-formoterol (Symbicort) 160-4 5 mcg/act inhaler Inhale 2 puffs 2 (two) times a day      Calcium Carb-Cholecalciferol (CALCIUM 1000 + D PO) Take 1,000 mg by mouth daily      cholecalciferol (VITAMIN D3) 400 units tablet Take 400 Units by mouth daily      diltiazem (CARDIZEM CD) 120 mg 24 hr capsule Take 1 capsule (120 mg total) by mouth daily 30 capsule 5    fluticasone-umeclidinium-vilanterol (Trelegy Ellipta) 100-62 5-25 MCG/INH inhaler Inhale 1 puff daily Rinse mouth after use   60 each 5    hydrOXYzine HCL (ATARAX) 25 mg tablet Take 1 tablet (25 mg total) by mouth every 6 (six) hours as needed for anxiety 90 tablet 0    ipratropium-albuterol (DUO-NEB) 0 5-2 5 mg/3 mL nebulizer solution Take 1 vial (3 mL total) by nebulization every 6 (six) hours while awake 300 mL 0    iron polysaccharides (Ferrex 150) 150 mg capsule Take 1 capsule (150 mg total) by mouth daily 60 capsule 5    magnesium oxide (MAG-OX) 400 mg Take 1 tablet (400 mg total) by mouth 2 (two) times a day 60 tablet 5    menthol-methyl salicylate (BENGAY) 59-52 % cream Apply topically 4 (four) times a day as needed (back pain) 85 g 0    montelukast (SINGULAIR) 10 mg tablet Take 1 tablet (10 mg total) by mouth daily at bedtime 90 tablet 1    omeprazole (PriLOSEC) 40 MG capsule       oxyCODONE-acetaminophen (PERCOCET) 5-325 mg per tablet take 1 tablet by mouth every 6 hours if needed for moderate pain MAXIMUM DAILY DOSE OF 4 tablets      OXYGEN-HELIUM IN Inhale      pantoprazole (PROTONIX) 40 mg tablet take 1 tablet by mouth twice a day 60 tablet 5    psyllium (METAMUCIL) packet Take 1 packet by mouth 3 (three) times a day 100 packet 1    theophylline (CORTNEY-24) 200 MG 24 hr capsule Take 1 capsule (200 mg total) by mouth daily 30 capsule 0    furosemide (LASIX) 20 mg tablet take 2 tablets by mouth twice a day 60 tablet 5    gabapentin (NEURONTIN) 300 mg capsule Take 1 capsule (300 mg total) by mouth 3 (three) times a day 90 capsule 0    HYDROcodone-acetaminophen (NORCO) 5-325 mg per tablet Take 1 tablet by mouth every 6 (six) hours as needed for painMax Daily Amount: 4 tablets (Patient not taking: Reported on 5/7/2021) 90 tablet 0    lidocaine (LIDODERM) 5 % Apply 1 patch topically daily Remove & Discard patch within 12 hours or as directed by MD 30 patch 0    metFORMIN (GLUCOPHAGE) 500 mg tablet Take 1 tablet (500 mg total) by mouth 2 (two) times a day with meals 60 tablet 3    methocarbamol (ROBAXIN) 500 mg tablet Take 1 tablet (500 mg total) by mouth every 8 (eight) hours for 7 days (Patient taking differently: Take 500 mg by mouth 4 (four) times a day ) 21 tablet 0    metoprolol succinate (TOPROL-XL) 25 mg 24 hr tablet Take 3 tablets (75 mg total) by mouth every morning (Patient taking differently: Take 25 mg by mouth every morning ) 90 tablet 0    metoprolol succinate (TOPROL-XL) 50 mg 24 hr tablet Take 1 tablet (50 mg total) by mouth daily at bedtime 30 tablet 0    polyethylene glycol (MIRALAX) 17 g packet Take 17 g by mouth daily 510 g 0    potassium chloride (K-DUR,KLOR-CON) 20 mEq tablet Take 2 tablets (40 mEq total) by mouth daily (Patient taking differently: Take 40 mEq by mouth 2 (two) times a day ) 60 tablet 0    predniSONE 5 mg tablet Take 1 tablet (5 mg total) by mouth 2 (two) times a day with meals Twice daily (Patient not taking: Reported on 5/27/2021) 60 tablet 5    simethicone (MYLICON) 80 mg chewable tablet Chew 1 tablet (80 mg total) every 6 (six) hours as needed for flatulence (Patient not taking: Reported on 5/4/2021) 30 tablet 0    sitaGLIPtin (JANUVIA) 50 mg tablet Take 1 tablet (50 mg total) by mouth daily 30 tablet 5     No current facility-administered medications for this visit  _______________________________________________________________________  Review of Systems   Constitutional: Positive for fatigue  Negative for activity change, appetite change, chills and fever  HENT: Negative for congestion, ear pain, postnasal drip, rhinorrhea, sinus pressure, sinus pain, sore throat and trouble swallowing  Eyes: Negative for pain  Respiratory: Positive for shortness of breath  Negative for cough and chest tightness  Cardiovascular: Negative for chest pain, palpitations and leg swelling  Gastrointestinal: Negative for abdominal pain, constipation, diarrhea, nausea and vomiting  Genitourinary: Negative for difficulty urinating, flank pain, frequency and urgency  Musculoskeletal: Positive for back pain  Negative for joint swelling and myalgias  Skin: Negative for color change  Neurological: Negative for dizziness, weakness, light-headedness and headaches  Psychiatric/Behavioral: Negative for sleep disturbance  The patient is not nervous/anxious  Objective:  Vitals:    06/18/21 0938   Weight: 57 2 kg (126 lb)     Body mass index is 24 61 kg/m²  Physical Exam  Constitutional:       General: She is awake  Appearance: Normal appearance  She is well-developed  HENT:      Head: Normocephalic  Nose: Nose normal       Mouth/Throat:      Mouth: Mucous membranes are moist    Eyes:      Extraocular Movements: Extraocular movements intact  Comments: B/l macular degeneration    Cardiovascular:      Rate and Rhythm: Normal rate  Pulmonary:      Effort: Pulmonary effort is normal  Prolonged expiration present  Comments: Patient has chronic home oxygen use  Abdominal:      Palpations: Abdomen is soft  Musculoskeletal:         General: Normal range of motion  Cervical back: Normal range of motion  Comments: Chronic back pains   Skin:     General: Skin is warm and dry     Neurological:      Mental Status: She is alert and oriented to person, place, and time  Psychiatric:         Attention and Perception: Attention normal          Mood and Affect: Mood normal          Speech: Speech normal          Behavior: Behavior normal  Behavior is cooperative             PHQ-9 Depression Screening    PHQ-9:   Frequency of the following problems over the past two weeks:

## 2021-06-17 NOTE — ASSESSMENT & PLAN NOTE
· Patient no longer receiving physical therapy  · Does complain of occasional back pains  · Fosamax, vitamin d, neurontin, norco, atarax, robaxin

## 2021-06-17 NOTE — ASSESSMENT & PLAN NOTE
Lab Results   Component Value Date    HGBA1C 12 6 (A) 04/09/2021   · get HgBA1c lab  · Continue metformin, januvia

## 2021-06-17 NOTE — ASSESSMENT & PLAN NOTE
Wt Readings from Last 3 Encounters:   05/21/21 60 3 kg (133 lb)   05/04/21 60 3 kg (133 lb)   04/29/21 60 6 kg (133 lb 9 6 oz)   · weight:  · Continue diltiazem, lasix, toprol

## 2021-06-18 PROBLEM — R65.10 SIRS (SYSTEMIC INFLAMMATORY RESPONSE SYNDROME) (HCC): Status: RESOLVED | Noted: 2020-12-12 | Resolved: 2021-01-01

## 2021-06-18 PROBLEM — I48.91 ATRIAL FIBRILLATION WITH RAPID VENTRICULAR RESPONSE (HCC): Status: RESOLVED | Noted: 2019-10-06 | Resolved: 2021-01-01

## 2021-06-18 PROBLEM — T81.31XD WOUND DEHISCENCE, SURGICAL, SUBSEQUENT ENCOUNTER: Status: RESOLVED | Noted: 2020-03-04 | Resolved: 2021-01-01

## 2021-06-18 PROBLEM — Z92.89 HISTORY OF RECENT HOSPITALIZATION: Status: RESOLVED | Noted: 2021-01-01 | Resolved: 2021-01-01

## 2021-06-18 PROBLEM — E87.1 HYPONATREMIA: Status: RESOLVED | Noted: 2021-01-01 | Resolved: 2021-01-01

## 2021-06-18 PROBLEM — J38.3 VOCAL CORD DYSFUNCTION: Status: RESOLVED | Noted: 2017-10-20 | Resolved: 2021-01-01

## 2021-06-18 PROBLEM — F41.0 PANIC ATTACK: Status: RESOLVED | Noted: 2018-01-23 | Resolved: 2021-01-01

## 2021-06-18 PROBLEM — K57.91 DIVERTICULOSIS OF INTESTINE WITH BLEEDING: Chronic | Status: RESOLVED | Noted: 2018-12-28 | Resolved: 2021-01-01

## 2021-06-18 PROBLEM — R84.5 POSITIVE CULTURE FINDINGS IN SPUTUM: Status: RESOLVED | Noted: 2019-10-12 | Resolved: 2021-01-01

## 2021-06-18 PROBLEM — E87.6 HYPOKALEMIA: Status: RESOLVED | Noted: 2020-12-12 | Resolved: 2021-01-01

## 2021-06-18 NOTE — ASSESSMENT & PLAN NOTE
· Patient is high risk for re-admissions  · Continue 4 week visits with PCP   · Continue HHC  · Consistent monitoring of current medication regimen  · Patient is 'doing well' at this time  · Will contact pharmacy for current medication list with prices as patient is now having difficulty with finances  · Pt has RN scheduled for 10:30am today, will call and discuss current medications with RN and patient

## 2021-06-18 NOTE — ASSESSMENT & PLAN NOTE
· Continue steroid taper - patient will need prednisone 30 mg x1 day, then 20 mg x3 days and then 10 mg x3 days and then stop no

## 2021-06-24 NOTE — TELEPHONE ENCOUNTER
Home healthcare called and just wanted to let us know that they have no aids to see her this week, they will be back to her next week

## 2021-06-24 NOTE — PROGRESS NOTES
Outpatient Care Management Note:  Follow up call placed to [address] [place]  She is doing well  No chest pain, shortness of breath or increased heart rate  She has not received a determination on her MA or waiver application  She did mail all of her financial information approximately 2 weeks ago  She spoke with the county on 6/21/2021 and the documentation was received but it had not all been entered into the computer  She is expecting another call today  She is hoping it is completed soon as money is "tight" now that she is no longer able to work  Emotional support given  Denies any needs at this time  Encouraged to call with any questions or concerns

## 2021-07-08 NOTE — PROGRESS NOTES
Outpatient Care Management Note:  Follow up call placed to [address] [place] [address]  She is feeling down because she was turned down for MA/waivers  Her  from [facility] is filing an appeal   Since [address] [place] [address] is no longer working, she is unable to afford her Trelegy or Eliquis  Advised her that I could look into patient assistance for those two medications  She is agreeable to that plan  She has no increased shortness of breath, chest pain or weight gain  Denies any needs at this time  Encouraged to call with any questions or concerns  Call placed to [facility]  They are able to fax a year to date out of pocket medication cost to the office  Call placed to office to make them aware that the fax would be coming and I would pick it up  Call placed to [address] [place] [address] to make her aware that I would stop there on Monday for signatures on the forms  Agreeable to that plan

## 2021-07-12 NOTE — PROGRESS NOTES
Outpatient Care Management Note:  Patient assistance forms for Eliquis and Trelegy completed  Met with Marlon Sales in her home for signatures as well as copies of her prescription plan card and most recent years taxes  Provider signatures as well as updated prescription for above medications obtained from PCP

## 2021-07-19 NOTE — PROGRESS NOTES
Outpatient Care Management Note:  Follow up call placed to Grace to determine if she has heard from either or the drug companies regarding her medications  Alberto Baumann has heard from [facility] and they will be providing her medication at no cost   She is hoping that the medication arrives today, it was shipped on Friday according to the call she received  Alberto Baumann has reached out to her PCP because she feels that she is on too many medications  She has not heard from [address] [place] [address] regarding her Trelegy at this time  Advised her that I would place a follow up call  She is agreeable to that plan  Call placed to [address] [place] [address] PAP  They have no record of Becka's application  Verified that Trelegy is a covered medication  Application re-faxed as requested  Per conversation it takes 2 business days to process application  Will follow up on 7/21/2021

## 2021-07-21 NOTE — PROGRESS NOTES
Outpatient Care Management Note:  Follow up call placed to [employer]  They did receive [place]'s application  They are requiring a signed prescription for the Trelegy  Questioned that there was a prescription included in the application  Their policy is that they do not accept electronic signatures on the application, it must be an actual signature  They provided Grace's patient ID and stated that all they required was the signed prescription and she should receive her inhalers in 7-10 business days  Obtained signed prescription from PCP and faxed same to [facility]  Follow up call placed to [address] [place] [address]  She received her Eliquis yesterday  Updated her on the status of the Trelegy  She is appreciative of the assistance  Advised her that I would be mailing her the completed applications as well as a form with the numbers to call for each medication when they need to be refilled  She is aware that her BPCI episode will close next week  Agreed that I would keep her on surveillance after that as she may need an MA application completed after filing her taxes  Denies any needs at this time  Encouraged to call with any questions or concerns

## 2021-07-22 NOTE — PROGRESS NOTES
Outpatient Care Management Note:  Call placed to Elisabeth Amin to determine if an appeal was filed on her behalf  Message left requesting a return call

## 2021-07-28 NOTE — PROGRESS NOTES
Outpatient Care Management Note:  Follow up call placed to [facility]  She received a letter from [address] [place] [address] stating that she would be receiving the Trelegy at no cost   She is very appreciative of the assistance we were able to provide  Her breathing is at her baseline  She is aware that her BPCI episode is ending  She is in agreement to be kept in a surveillance episode as she will need an LHEAP application done in the future as well as a new MA application after her taxes are completed

## 2021-08-09 NOTE — PROGRESS NOTES
Outpatient Care Management Note:  Received in basket message regarding Grace's Trelegy  She left a message with the office stating that she had not received it yet  Call placed to [address] [place] [address] PAP  Application was approved and medication was mailed  Per tracking, it is scheduled to arrive by 8/12/2021  Call placed to [address] [place] [address] with an update on the medication  She is also concerned because her GI physician called regarding her iron level being low and her thyroid level being high  Dr Sandra Banerjee mentioned possible surgery which is concerning to [address] [place] [address]  He was to forward the results to her PCP but she has not heard anything from the office at this time  Advised her that I would send them a message to review the lab work

## 2021-08-18 NOTE — TELEPHONE ENCOUNTER
Patient reports her back has been bothering her for a couple of weeks. Patient reports the pain is very severe today. Scheduled pt for B/L SIJ for [facility] office on 10/9/20   Went over pre procedure instructions below:  Nothing to eat or drink 1 hr prior to procedure  Need to arrange transportation  Proper clothing for procedure  If ill or placed on antibiotics please call to reschedule  Covid disclaimer

## 2021-08-18 NOTE — ANESTHESIA POSTPROCEDURE EVALUATION
Post-Op Assessment Note    CV Status:  Stable  Pain Score: 0    Pain management: adequate     Mental Status:  Alert and awake   Hydration Status:  Euvolemic   PONV Controlled:  Controlled   Airway Patency:  Patent      Post Op Vitals Reviewed: Yes      Staff: CRNA         No complications documented      BP   108/57   Temp      Pulse  68   Resp   24   SpO2   97 4l NC

## 2021-08-18 NOTE — DISCHARGE INSTRUCTIONS
Upper Endoscopy   WHAT YOU NEED TO KNOW:   An upper endoscopy is also called an upper gastrointestinal (GI) endoscopy, or an esophagogastroduodenoscopy (EGD)  It is a procedure to examine the inside of your esophagus, stomach, and duodenum (first part of the small intestine) with a scope  You may feel bloated, gassy, or have some abdominal discomfort after your procedure  Your throat may be sore for 24 to 36 hours  You may burp or pass gas from air that is still inside your body  DISCHARGE INSTRUCTIONS:   Seek care immediately if:    You have sudden, severe abdominal pain   You have problems swallowing   You have a large amount of black, sticky bowel movements or blood in your bowel movements   You have sudden trouble breathing   You feel weak, lightheaded, or faint or your heart beats faster than normal for you  Contact your healthcare provider if:    You have a fever and chills   You have nausea or are vomiting   Your abdomen is bloated or feels full and hard   You have abdominal pain   You have black, sticky bowel movements or blood in your bowel movements   You have not had a bowel movement for 3 days after your procedure   You have rash or hives   You have questions or concerns about your procedure  Activity:    Do not lift, strain, or run for 24 hours after your procedure   Rest after your procedure  You have been given medicine to relax you  Do not drive or make important decisions until the day after your procedure  Return to your normal activity as directed   Relieve gas and discomfort from bloating by lying on your right side with a heating pad on your abdomen  You may need to take short walks to help the gas move out  Eat small meals until bloating is relieved  Follow up with your healthcare provider as directed: Write down your questions so you remember to ask them during your visits       If you take a blood thinner, please review the specific instructions from your endoscopist about when you should resume it  These can be found in the Recommendation and Your Medication list sections of this After Visit Summary  Esophageal Dilation   WHAT YOU NEED TO KNOW:   Esophageal dilation is a procedure to widen a narrow part of your esophagus  Your healthcare provider will use a dilator (inflatable balloon or another tool that expands) to make the area wider  He or she may also do an endoscopy before or during your esophageal dilation  During an endoscopy, your healthcare provider will use a scope to see inside your esophagus  DISCHARGE INSTRUCTIONS:   Call your local emergency number (911 in the [address] [place] [address],[address]) if:   · You vomit blood  · You have a fast heartbeat, chest pain, or sudden trouble breathing  · Your abdomen suddenly becomes tender and hard  Call your doctor if:   · You are not able to swallow any food  · You have a fever  · You feel very full or bloated  · You have nausea or are vomiting  · You have questions or concerns about your condition or care  Medicines:   · Medicines  may be given to decrease stomach acid that can irritate your esophagus  · Take your medicine as directed  Contact your healthcare provider if you think your medicine is not helping or if you have side effects  Tell him or her if you are allergic to any medicine  Keep a list of the medicines, vitamins, and herbs you take  Include the amounts, and when and why you take them  Bring the list or the pill bottles to follow-up visits  Carry your medicine list with you in case of an emergency  Nutrition:  Your healthcare provider will tell you how long to wait after the procedure before you eat or drink anything  You may need to wait until any numbness in your throat is gone  When it is okay to eat, chew your food well  Eat soft foods if you still have problems swallowing   Soft foods include applesauce, bananas, cooked cereal, cottage cheese, eggs, pudding, and yogurt  Follow up with your healthcare provider as directed:  Write down your questions so you remember to ask them during your visits  © Copyright [employer] 2021 Information is for End User's use only and may not be sold, redistributed or otherwise used for commercial purposes  All illustrations and images included in CareNotes® are the copyrighted property of [employer]  or [facility] Giselle Painter   The above information is an  only  It is not intended as medical advice for individual conditions or treatments  Talk to your doctor, nurse or pharmacist before following any medical regimen to see if it is safe and effective for you

## 2021-08-18 NOTE — ANESTHESIA PREPROCEDURE EVALUATION
Procedure:  EGD    Relevant Problems   CARDIO   (+) Essential hypertension   (+) Paroxysmal atrial fibrillation (HCC)      ENDO   (+) Type 2 diabetes mellitus without complication, without long-term current use of insulin (HCC)      GI/HEPATIC   (+) Chronic peptic ulcer of stomach   (+) Gastroesophageal reflux disease without esophagitis      HEMATOLOGY   (+) Iron deficiency anemia      MUSCULOSKELETAL   (+) Chronic bilateral low back pain without sciatica   (+) Gout   (+) Sacroiliitis (HCC)      NEURO/PSYCH   (+) Chronic anxiety   (+) Chronic pain syndrome   (+) History of angioedema   (+) History of colonic polyps      PULMONARY   (+) Acute on chronic respiratory failure with hypoxia (HCC)   (+) Asthma   (+) Chronic respiratory failure with hypoxia (HCC)   (+) Other emphysema (HCC)   (+) Shortness of breath      Chronic hypoxic respiratory failure on 2LPM home O2    Physical Exam    Airway    Mallampati score: II  TM Distance: >3 FB  Neck ROM: full     Dental   upper dentures and lower dentures,     Cardiovascular      Pulmonary      Other Findings        Anesthesia Plan  ASA Score- 4     Anesthesia Type- IV sedation with anesthesia with ASA Monitors  Additional Monitors:   Airway Plan:           Plan Factors-Exercise tolerance (METS): <4 METS  Chart reviewed  Existing labs reviewed  Patient summary reviewed  Induction- intravenous  Postoperative Plan-     Informed Consent- Anesthetic plan and risks discussed with patient  I personally reviewed this patient with the CRNA  Discussed and agreed on the Anesthesia Plan with the CRNA  Shanell Chacon

## 2021-08-24 NOTE — ASSESSMENT & PLAN NOTE
Rate controlled  Continue diltiazem 120 mg daily, Toprol 75 mg daily  Eliquis 2 5 mg bid for stroke risk reduction

## 2021-08-24 NOTE — ASSESSMENT & PLAN NOTE
Wt Readings from Last 3 Encounters:   08/24/21 55 7 kg (122 lb 12 8 oz)   08/18/21 56 2 kg (124 lb)   06/18/21 57 2 kg (126 lb)     Euvolemic on exam   Continue Lasix 20 mg daily

## 2021-08-24 NOTE — PROGRESS NOTES
Patient ID: Harmeet Michael is a 82 y o  female  Plan:      Paroxysmal atrial fibrillation (HCC)  Rate controlled  Continue diltiazem 120 mg daily, Toprol 75 mg daily  Eliquis 2 5 mg bid for stroke risk reduction     Essential hypertension  Blood pressure well controlled  Continue current regimen     Other emphysema ([address] )  Doing well, followed by PCP and pulmonary  Using supplemental O2 prn    Chronic diastolic CHF (congestive heart failure) ([address] )  Wt Readings from Last 3 Encounters:   08/24/21 55 7 kg (122 lb 12 8 oz)   08/18/21 56 2 kg (124 lb)   06/18/21 57 2 kg (126 lb)     Euvolemic on exam   Continue Lasix 20 mg daily           Follow up Plan/Summary Comments:  Mckenna Garcia is doing very well from a cardiac standpoint  She is actually off her oxygen today and is euvolemic on exam   Her biggest struggles recently have been related to back pain that has been ongoing since April  She is slowly improving  From a cardiac standpoint, I have not made any medication adjustments  No testing is indicated at this time  She will follow-up in 6 months and notify us sooner with any changes in her condition  HPI: Mckenna Garcia presents today for a routine follow up visit  She has been doing well from a cardiac standpoint  She was hospitalized toward the end of April with Afib, RVR  She was also experiencing ambulatory dysfunction and significant back pain from known compression fractures in her lower spine  She has been continuing to gradually improve from this standpoint  Today, she reports that her breathing has been stable  She is actually not using her supplemental oxygen today  She does use it when needed  Her weight has been stable and she has no evidence of volume overload  She denies any chest pain or pressure, palpitations, lightheadedness, dizziness  She reports some numbness in her lower extremities and reports that her hair is falling out    These have been issues ongoing for several months and she plans to discuss them with her PCP at her upcoming appointment  Review of Systems   10  point ROS  was otherwise non pertinent or negative except as per HPI or as below  Gait: wheel chair      Most recent or relevant cardiac/vascular testing:      Echo 02/22/2021 EF 60%, mild LVH, mild diastolic dysfunction  Mild to moderate AI  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 9 cm    Echo 10/7/2019 EF 60% mild LVH, mild DD  Mild AI      Objective:     BP 92/62   Pulse 68   Ht 5' (1 524 m)   Wt 55 7 kg (122 lb 12 8 oz) Comment: patient in wheelchair  LMP  (LMP Unknown)   BMI 23 98 kg/m²     PHYSICAL EXAM:    General:  Normal appearance, no acute distress  Eyes:  Anicteric  Oral mucosa:  Wearing a mask  Neck:  No JVD  Carotid upstrokes are brisk without bruits  No masses  Chest:  Clear to auscultation   Cardiac:  Irregularly irregular  No palpable PMI  Normal S1 and S2  No murmur gallop or rub  Abdomen:  Soft and nontender  No palpable organomegaly or aortic enlargement  Extremities:  No peripheral edema  Musculoskeletal:  Symmetric  Vascular:  Pedal pulses are intact  Neuro:  Grossly symmetric  Psych:  Alert and oriented x3      Allergies   Allergen Reactions    Bee Venom Shortness Of Breath    Fluticasone      Per pt  error       Current Outpatient Medications:     acetaminophen (TYLENOL) 325 mg tablet, Take 650 mg by mouth every 4 (four) hours as needed for mild pain, Disp: , Rfl:     alendronate (FOSAMAX) 70 mg tablet, take 1 tablet by mouth every 7 days, Disp: 4 tablet, Rfl: 5    apixaban (ELIQUIS) 2 5 mg, Take 1 tablet (2 5 mg total) by mouth 2 (two) times a day, Disp: 60 tablet, Rfl: 5    Artificial Tear Solution (GENTEAL TEARS) 0 1-0 2-0 3 % SOLN, Administer 1 drop to both eyes 3 (three) times a day, Disp: , Rfl:     budesonide-formoterol (Symbicort) 160-4 5 mcg/act inhaler, Inhale 2 puffs 2 (two) times a day, Disp: , Rfl:     Calcium Carb-Cholecalciferol (CALCIUM 1000 + D PO), Take 1,000 mg by mouth daily, Disp: , Rfl:     cholecalciferol (VITAMIN D3) 400 units tablet, Take 400 Units by mouth daily, Disp: , Rfl:     diltiazem (CARDIZEM CD) 120 mg 24 hr capsule, Take 1 capsule (120 mg total) by mouth daily, Disp: 30 capsule, Rfl: 5    fluticasone-umeclidinium-vilanterol (Trelegy Ellipta) 100-62 5-25 MCG/INH inhaler, Inhale 1 puff daily Rinse mouth after use , Disp: 60 each, Rfl: 5    furosemide (LASIX) 20 mg tablet, take 2 tablets by mouth twice a day, Disp: 120 tablet, Rfl: 5    gabapentin (NEURONTIN) 300 mg capsule, Take 1 capsule (300 mg total) by mouth 3 (three) times a day, Disp: 90 capsule, Rfl: 0    hydrOXYzine HCL (ATARAX) 25 mg tablet, Take 1 tablet (25 mg total) by mouth every 6 (six) hours as needed for anxiety, Disp: 90 tablet, Rfl: 0    ipratropium (Atrovent HFA) 17 mcg/act inhaler, Inhale 2 puffs every 6 (six) hours, Disp: 38 7 g, Rfl: 5    ipratropium-albuterol (DUO-NEB) 0 5-2 5 mg/3 mL nebulizer solution, Take 3 mL by nebulization every 6 (six) hours while awake, Disp: 300 mL, Rfl: 5    iron polysaccharides (Ferrex 150) 150 mg capsule, Take 1 capsule (150 mg total) by mouth daily, Disp: 60 capsule, Rfl: 5    magnesium oxide (MAG-OX) 400 mg, Take 1 tablet (400 mg total) by mouth 2 (two) times a day, Disp: 60 tablet, Rfl: 5    metFORMIN (GLUCOPHAGE) 500 mg tablet, Take 1 tablet (500 mg total) by mouth 2 (two) times a day with meals, Disp: 60 tablet, Rfl: 3    methocarbamol (ROBAXIN) 500 mg tablet, Take 1 tablet (500 mg total) by mouth every 8 (eight) hours for 7 days (Patient taking differently: Take 500 mg by mouth 4 (four) times a day ), Disp: 21 tablet, Rfl: 0    metoprolol succinate (TOPROL-XL) 25 mg 24 hr tablet, Take 3 tablets (75 mg total) by mouth every morning, Disp: 90 tablet, Rfl: 5    montelukast (SINGULAIR) 10 mg tablet, Take 1 tablet (10 mg total) by mouth daily at bedtime, Disp: 90 tablet, Rfl: 1    omeprazole (PriLOSEC) 40 MG capsule, Take 40 mg by mouth every morning , Disp: , Rfl:     oxyCODONE-acetaminophen (PERCOCET) 5-325 mg per tablet, Take 1 tablet by mouth every 6 (six) hours as needed for moderate painMax Daily Amount: 4 tablets, Disp: 120 tablet, Rfl: 0    OXYGEN-HELIUM IN, Inhale, Disp: , Rfl:     pantoprazole (PROTONIX) 40 mg tablet, take 1 tablet by mouth twice a day, Disp: 60 tablet, Rfl: 5    polyethylene glycol (MIRALAX) 17 g packet, Take 17 g by mouth daily, Disp: 510 g, Rfl: 0    potassium chloride (K-DUR,KLOR-CON) 20 mEq tablet, Take 2 tablets (40 mEq total) by mouth daily (Patient taking differently: Take 40 mEq by mouth 2 (two) times a day ), Disp: 60 tablet, Rfl: 0    predniSONE 5 mg tablet, Take 1 tablet (5 mg total) by mouth 2 (two) times a day with meals Twice daily, Disp: 60 tablet, Rfl: 5    psyllium (METAMUCIL) packet, Take 1 packet by mouth 3 (three) times a day, Disp: 100 packet, Rfl: 1    simethicone (MYLICON) 80 mg chewable tablet, Chew 1 tablet (80 mg total) every 6 (six) hours as needed for flatulence, Disp: 30 tablet, Rfl: 0    sitaGLIPtin (JANUVIA) 50 mg tablet, Take 1 tablet (50 mg total) by mouth daily, Disp: 30 tablet, Rfl: 5    theophylline (CORTNEY-24) 200 MG 24 hr capsule, Take 1 capsule (200 mg total) by mouth daily, Disp: 30 capsule, Rfl: 0    lidocaine (LIDODERM) 5 %, Apply 1 patch topically daily Remove & Discard patch within 12 hours or as directed by MD, Disp: 30 patch, Rfl: 0    menthol-methyl salicylate (BENGAY) 07-07 % cream, Apply topically 4 (four) times a day as needed (back pain) (Patient not taking: Reported on 8/24/2021), Disp: 85 g, Rfl: 0  Past Medical History:   Diagnosis Date    Allergies     Asthma     Chronic diastolic CHF (congestive heart failure) (HCC)     COPD (chronic obstructive pulmonary disease) (HCC)     Diabetes mellitus (HCC)     DJD (degenerative joint disease)     Gait abnormality     Hypertension     Paroxysmal atrial fibrillation (HCC)     Shortness of breath      Past Surgical History:   Procedure Laterality Date    BLADDER SURGERY      COLON SURGERY      COLONOSCOPY      COLONOSCOPY W/ POLYPECTOMY N/A 2019    Procedure: COLONOSCOPY W/ POLYPECTOMY;  Surgeon: Travis Ribeiro MD;  Location[facility]; Service: General    EGD      FL GUIDED NEEDLE PLAC BX/ASP/INJ  2020    FL GUIDED NEEDLE PLAC BX/ASP/INJ  2021    GASTROJEJUNOSTOMY W/ JEJUNOSTOMY TUBE N/A 2/3/2020    Procedure: Antrectomy with bilroth II Anastomosis;   Surgeon: Mich Dunaway MD;  Location: [facility];  Service: General   [place] JOINT Right 2020    Procedure: BLOCK / INJECTION SACROILIAC;  Surgeon: Sandra Kirkland MD;  Location: [facility];  Service: Pain Management     [place] INJECTION,SACROILIAC JOINT Right 2021    Procedure: RIGHT SACROILIAC JOINT INJECTION;  Surgeon: Sandra Kirkland MD;  Location: [facility];  Service: Pain Management     SMALL INTESTINE SURGERY  2020    with partial gastrectomy       CMP:   Lab Results   Component Value Date     2016    K 4 3 2021    K 4 1 2016     2021     2016    CO2 23 2021    CO2 26 0 2016    BUN 8 2021    BUN 13 2016    CREATININE 0 58 (L) 2021    CREATININE 0 61 2016    GLUCOSE 77 2016    EGFR 86 2021     Lipid Profile:    Lab Results   Component Value Date    CHOL 229 2016    TRIG 204 (H) 2020    TRIG 79 2016    HDL 42 2016         Social History     Tobacco Use   Smoking Status Former Smoker    Quit date: 2013    Years since quittin 7   Smokeless Tobacco Never Used
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
## 2021-08-24 NOTE — PATIENT INSTRUCTIONS
Continue to monitor symptoms  If new or worsening symptoms occur such as worsening redness, discharge, redness spreading up your extremity, fevers chills, nausea vomiting, or any concerning symptoms occur go immediately to the ER  Follow up in 10 to 14 days for suture removal     Laceration   WHAT YOU NEED TO KNOW:   A laceration is an injury to the skin and the soft tissue underneath it  Lacerations can happen anywhere on the body  DISCHARGE INSTRUCTIONS:   Return to the emergency department if:   · You have heavy bleeding or bleeding that does not stop after 10 minutes of holding firm, direct pressure over the wound  · Your wound opens up  Call your doctor if:   · You have a fever or chills  · Your laceration is red, warm, or swollen  · You have red streaks on your skin coming from your wound  · You have white or yellow drainage from the wound that smells bad  · You have pain that gets worse, even after treatment  · You have questions or concerns about your condition or care  Medicines: You may need any of the following:  · Prescription pain medicine  may be given  Ask your healthcare provider how to take this medicine safely  Some prescription pain medicines contain acetaminophen  Do not take other medicines that contain acetaminophen without talking to your healthcare provider  Too much acetaminophen may cause liver damage  Prescription pain medicine may cause constipation  Ask your healthcare provider how to prevent or treat constipation  · Antibiotics  help treat or prevent a bacterial infection  · Take your medicine as directed  Contact your healthcare provider if you think your medicine is not helping or if you have side effects  Tell him or her if you are allergic to any medicine  Keep a list of the medicines, vitamins, and herbs you take  Include the amounts, and when and why you take them  Bring the list or the pill bottles to follow-up visits   Carry your medicine list with you in case of an emergency  Care for your wound as directed:   · Do not get your wound wet  until your healthcare provider says it is okay  Do not soak your wound in water  Do not go swimming until your healthcare provider says it is okay  Carefully wash the wound with soap and water  Gently pat the area dry or allow it to air dry  · Change your bandages  when they get wet, dirty, or after washing  Apply new, clean bandages as directed  Do not apply elastic bandages or tape too tight  Do not put powders or lotions over your incision  · Apply antibiotic ointment as directed  Your healthcare provider may give you antibiotic ointment to put over your wound if you have stitches  If you have strips of tape over your incision, let them dry up and fall off on their own  If they do not fall off within 14 days, gently remove them  If you have glue over your wound, do not remove or pick at it  If your glue comes off, do not replace it with glue that you have at home  · Check your wound every day for signs of infection, such as swelling, redness, or pus  Self-care:   · Apply ice  on your wound for 15 to 20 minutes every hour or as directed  Use an ice pack, or put crushed ice in a plastic bag  Cover it with a towel  Ice helps prevent tissue damage and decreases swelling and pain  · Use a splint as directed  A splint will decrease movement and stress on your wound  It may help it heal faster  A splint may be used for lacerations over joints or areas of your body that bend  Ask your healthcare provider how to apply and remove a splint  · Decrease scarring of your wound  by applying ointments as directed  Do not apply ointments until your healthcare provider says it is okay  You may need to wait until your wound is healed  Ask which ointment to buy and how often to use it  After your wound is healed, use sunscreen over the area when you are out in the sun   You should do this for at least 6 months to 1 year after your injury  Follow up with your doctor as directed: You may need to follow up in 24 to 48 hours to have your wound checked for infection  You will need to return in 3 to 14 days if you have stitches or staples so they can be removed  Care for your wound as directed to prevent infection and help it heal  Write down your questions so you remember to ask them during your visits  © Copyright [employer] 2021 Information is for End User's use only and may not be sold, redistributed or otherwise used for commercial purposes  All illustrations and images included in CareNotes® are the copyrighted property of A [employer] A M , Inc  or Formerly named [facility] Giselle Painter   The above information is an  only  It is not intended as medical advice for individual conditions or treatments  Talk to your doctor, nurse or pharmacist before following any medical regimen to see if it is safe and effective for you

## 2021-09-02 PROBLEM — Z23 ENCOUNTER FOR IMMUNIZATION: Status: ACTIVE | Noted: 2021-01-01

## 2021-09-02 PROBLEM — Z98.890 HISTORY OF ESOPHAGOGASTRODUODENOSCOPY (EGD): Status: ACTIVE | Noted: 2021-01-01

## 2021-09-02 PROBLEM — Z48.02 VISIT FOR SUTURE REMOVAL: Status: ACTIVE | Noted: 2021-01-01

## 2021-09-02 NOTE — ASSESSMENT & PLAN NOTE
· Continue chronic oxygen  · Continue symbicort, trelegy, atrovent, duoneb, singulair, prednisone, theophylline

## 2021-09-02 NOTE — ASSESSMENT & PLAN NOTE
· 8/2/2021 EGD w Dr Shameka Butler   · Status post Billroth 2, done for gastric outlet obstruction  There was a small amount of food in the stomach and some mild gastrojejunal anastomotic inflammation which was biopsied    · Schatzki ring in the lower third of the esophagus; performed 3 cold forceps biopsies; dilated from 18 mm starting size to 18 mm end size  · Ring in the upper third of the esophagus; dilated with balloon dilator from 16 5 mm starting size to 16 5 mm end size

## 2021-09-02 NOTE — PROGRESS NOTES
Assessment/Plan:    Problem List Items Addressed This Visit        Digestive    Gastroesophageal reflux disease without esophagitis     · Continue protonix             Endocrine    Type 2 diabetes mellitus without complication, without long-term current use of insulin (HCC)       · Continue neurontin, metformin, januvia             Respiratory    Other emphysema (HCC) - Primary     · Continue chronic oxygen  · Continue symbicort, trelegy, atrovent, duoneb, singulair, prednisone, theophylline         Chronic respiratory failure with hypoxia (HCC)     · chronic oxygen            Cardiovascular and Mediastinum    Cardiomegaly    Paroxysmal atrial fibrillation (HCC)    Chronic diastolic CHF (congestive heart failure) ([facility])     Wt Readings from Last 3 Encounters:   08/24/21 55 7 kg (122 lb 12 8 oz)   08/18/21 56 2 kg (124 lb)   06/18/21 57 2 kg (126 lb)     · Continue eliquis, cardizem, lasix, toprol             Essential hypertension     · Continue toprol             Nervous and Auditory    Lumbar radiculopathy     · Chronic pains  · Continue fosamax, calcium, vitamin D, neurontin, atarax, robaxin, percocet            Musculoskeletal and Integument    Osteoporosis    Compression fracture of L4 vertebra (HCC)    Closed wedge compression fracture of T12 vertebra (HCC)    Sacroiliitis (HCC)       Other    Chronic anxiety (Chronic)    Chronic bilateral low back pain without sciatica    Macular degeneration of both eyes    Chronic pain syndrome    Shortness of breath    Uncomplicated opioid dependence (HCC)    Ambulatory dysfunction    Iron deficiency anemia     · Continue daily oranges for vitamin C         History of esophagogastroduodenoscopy (EGD)     · 8/2/2021 EGD w Dr Saeid Huerta   · Status post Billroth 2, done for gastric outlet obstruction  There was a small amount of food in the stomach and some mild gastrojejunal anastomotic inflammation which was biopsied    · Schatzki ring in the lower third of the esophagus; performed 3 cold forceps biopsies; dilated from 18 mm starting size to 18 mm end size  · Ring in the upper third of the esophagus; dilated with balloon dilator from 16 5 mm starting size to 16 5 mm end size         Visit for suture removal     · 6 sutures removed intact of left lower ankle  · Wound is approximated  · No redness, warmth, drainage  · May uncover when at home  Relevant Orders    Suture removal    Encounter for immunization     · Influenza vaccine               Diagnoses and all orders for this visit:    Other emphysema ([address] )  -     fluticasone (FLONASE) 50 mcg/act nasal spray; 1 spray into each nostril daily    History of esophagogastroduodenoscopy (EGD)    Gastroesophageal reflux disease without esophagitis    Type 2 diabetes mellitus without complication, without long-term current use of insulin (HCC)    Chronic respiratory failure with hypoxia (HCC)    Cardiomegaly    Paroxysmal atrial fibrillation (HCC)    Chronic diastolic CHF (congestive heart failure) (HCC)    Essential hypertension    Lumbar radiculopathy    Closed wedge compression fracture of T12 vertebra, sequela    Osteoporosis, unspecified osteoporosis type, unspecified pathological fracture presence    Compression fracture of L4 vertebra, sequela    Sacroiliitis (HCC)    Chronic anxiety    Chronic bilateral low back pain without sciatica    Macular degeneration of both eyes, unspecified type    Chronic pain syndrome    Shortness of breath    Uncomplicated opioid dependence (HCC)    Ambulatory dysfunction    Other iron deficiency anemia    Visit for suture removal  -     Suture removal    Encounter for immunization     Encounter for immunization    Other orders  -     Cancel: POCT hemoglobin A1c     History of esophagogastroduodenoscopy (EGD)  · 8/2/2021 EGD w Dr Zoey Bingham   · Status post Billroth 2, done for gastric outlet obstruction    There was a small amount of food in the stomach and some mild gastrojejunal anastomotic inflammation which was biopsied  · Schatzki ring in the lower third of the esophagus; performed 3 cold forceps biopsies; dilated from 18 mm starting size to 18 mm end size  · Ring in the upper third of the esophagus; dilated with balloon dilator from 16 5 mm starting size to 16 5 mm end size    Gastroesophageal reflux disease without esophagitis  · Continue protonix     Type 2 diabetes mellitus without complication, without long-term current use of insulin (HCC)    · Continue neurontin, metformin, januvia     Other emphysema (HCC)  · Continue chronic oxygen  · Continue symbicort, trelegy, atrovent, duoneb, singulair, prednisone, theophylline    Chronic respiratory failure with hypoxia (HCC)  · chronic oxygen    Chronic diastolic CHF (congestive heart failure) ([facility])  Wt Readings from Last 3 Encounters:   08/24/21 55 7 kg (122 lb 12 8 oz)   08/18/21 56 2 kg (124 lb)   06/18/21 57 2 kg (126 lb)     · Continue eliquis, cardizem, lasix, toprol        Essential hypertension  · Continue toprol     Lumbar radiculopathy  · Chronic pains  · Continue fosamax, calcium, vitamin D, neurontin, atarax, robaxin, percocet    Iron deficiency anemia  · Continue daily oranges for vitamin C    Visit for suture removal  · 6 sutures removed intact of left lower ankle  · Wound is approximated  · No redness, warmth, drainage  · May uncover when at home  Encounter for immunization  · Influenza vaccine       Subjective:      Patient ID: Loretta Jimenes is a 82 y o  female  The patient is here as part of her monthly follow up for her chronic COPD and respiratory failure issues  6 sutures removed         The following portions of the patient's history were reviewed and updated as appropriate:   Past Medical History:  She has a past medical history of Asthma, Chronic diastolic CHF (congestive heart failure) ([address] ), DJD (degenerative joint disease), Paroxysmal atrial fibrillation ([address] ), and Shortness of breath ,  _______________________________________________________________________  Medical Problems:  does not have any pertinent problems on file ,  _______________________________________________________________________  Past Surgical History:   has a past surgical history that includes Colon surgery; Colonoscopy w/ polypectomy (N/A, 1/21/2019); Bladder surgery; Small intestine surgery (03/2020); Gastrojejunostomy w/ jejunostomy tube (N/A, 2/3/2020); Colonoscopy; pr injection,sacroiliac joint (Right, 9/2/2020); FL guided needle plac bx/asp/inj (9/2/2020); pr injection,sacroiliac joint (Right, 1/21/2021); FL guided needle plac bx/asp/inj (1/21/2021); and EGD ,  _______________________________________________________________________  Family History:  family history includes Heart disease in her mother ,  _______________________________________________________________________  Social History:   reports that she quit smoking about 7 years ago  She has never used smokeless tobacco  She reports that she does not drink alcohol and does not use drugs  ,  _______________________________________________________________________  Allergies:  is allergic to bee venom and fluticasone     _______________________________________________________________________  Current Outpatient Medications   Medication Sig Dispense Refill    acetaminophen (TYLENOL) 325 mg tablet Take 650 mg by mouth every 4 (four) hours as needed for mild pain      alendronate (FOSAMAX) 70 mg tablet take 1 tablet by mouth every 7 days 4 tablet 5    apixaban (ELIQUIS) 2 5 mg Take 1 tablet (2 5 mg total) by mouth 2 (two) times a day 60 tablet 5    Artificial Tear Solution (GENTEAL TEARS) 0 1-0 2-0 3 % SOLN Administer 1 drop to both eyes 3 (three) times a day      budesonide-formoterol (Symbicort) 160-4 5 mcg/act inhaler Inhale 2 puffs 2 (two) times a day      Calcium Carb-Cholecalciferol (CALCIUM 1000 + D PO) Take 1,000 mg by mouth daily      cholecalciferol (VITAMIN D3) 400 units tablet Take 400 Units by mouth daily      diltiazem (CARDIZEM CD) 120 mg 24 hr capsule Take 1 capsule (120 mg total) by mouth daily 30 capsule 5    fluticasone-umeclidinium-vilanterol (Trelegy Ellipta) 100-62 5-25 MCG/INH inhaler Inhale 1 puff daily Rinse mouth after use   60 each 5    furosemide (LASIX) 20 mg tablet take 2 tablets by mouth twice a day 120 tablet 5    gabapentin (NEURONTIN) 300 mg capsule Take 1 capsule (300 mg total) by mouth 3 (three) times a day 90 capsule 0    hydrOXYzine HCL (ATARAX) 25 mg tablet Take 1 tablet (25 mg total) by mouth every 6 (six) hours as needed for anxiety 90 tablet 0    ipratropium (Atrovent HFA) 17 mcg/act inhaler Inhale 2 puffs every 6 (six) hours 38 7 g 5    ipratropium-albuterol (DUO-NEB) 0 5-2 5 mg/3 mL nebulizer solution Take 3 mL by nebulization every 6 (six) hours while awake 300 mL 5    iron polysaccharides (Ferrex 150) 150 mg capsule Take 1 capsule (150 mg total) by mouth daily 60 capsule 5    magnesium oxide (MAG-OX) 400 mg Take 1 tablet (400 mg total) by mouth 2 (two) times a day 60 tablet 5    metFORMIN (GLUCOPHAGE) 500 mg tablet Take 1 tablet (500 mg total) by mouth 2 (two) times a day with meals 60 tablet 3    metoprolol succinate (TOPROL-XL) 25 mg 24 hr tablet Take 3 tablets (75 mg total) by mouth every morning 90 tablet 5    montelukast (SINGULAIR) 10 mg tablet Take 1 tablet (10 mg total) by mouth daily at bedtime 90 tablet 1    omeprazole (PriLOSEC) 40 MG capsule Take 40 mg by mouth every morning       oxyCODONE-acetaminophen (PERCOCET) 5-325 mg per tablet Take 1 tablet by mouth every 6 (six) hours as needed for moderate painMax Daily Amount: 4 tablets 120 tablet 0    OXYGEN-HELIUM IN Inhale      pantoprazole (PROTONIX) 40 mg tablet take 1 tablet by mouth twice a day 60 tablet 5    polyethylene glycol (MIRALAX) 17 g packet Take 17 g by mouth daily 510 g 0    potassium chloride (K-DUR,KLOR-CON) 20 mEq tablet Take 2 tablets (40 mEq total) by mouth daily (Patient taking differently: Take 40 mEq by mouth 2 (two) times a day ) 60 tablet 0    predniSONE 5 mg tablet Take 1 tablet (5 mg total) by mouth 2 (two) times a day with meals Twice daily 60 tablet 5    psyllium (METAMUCIL) packet Take 1 packet by mouth 3 (three) times a day 100 packet 1    simethicone (MYLICON) 80 mg chewable tablet Chew 1 tablet (80 mg total) every 6 (six) hours as needed for flatulence 30 tablet 0    sitaGLIPtin (JANUVIA) 50 mg tablet Take 1 tablet (50 mg total) by mouth daily 30 tablet 5    theophylline (CORTNEY-24) 200 MG 24 hr capsule Take 1 capsule (200 mg total) by mouth daily 30 capsule 0    lidocaine (LIDODERM) 5 % Apply 1 patch topically daily Remove & Discard patch within 12 hours or as directed by MD 30 patch 0    menthol-methyl salicylate (BENGAY) 25-29 % cream Apply topically 4 (four) times a day as needed (back pain) (Patient not taking: Reported on 8/24/2021) 85 g 0    methocarbamol (ROBAXIN) 500 mg tablet Take 1 tablet (500 mg total) by mouth every 8 (eight) hours for 7 days (Patient not taking: Reported on 9/2/2021) 21 tablet 0     No current facility-administered medications for this visit      _______________________________________________________________________  Review of Systems   Constitutional: Positive for fatigue  Negative for activity change, chills and fever  HENT: Negative for rhinorrhea and sore throat  Eyes: Negative for pain  Respiratory: Positive for shortness of breath  Negative for cough  Cardiovascular: Negative for chest pain, palpitations and leg swelling  Gastrointestinal: Negative for abdominal pain, constipation, diarrhea, nausea and vomiting  Genitourinary: Negative for difficulty urinating, flank pain, frequency and urgency  Musculoskeletal: Positive for arthralgias, back pain, gait problem and myalgias  Negative for joint swelling  Skin: Negative for color change     Neurological: Negative for dizziness, weakness, light-headedness and headaches  Psychiatric/Behavioral: Negative for sleep disturbance  The patient is nervous/anxious  All other systems reviewed and are negative  Objective:  Vitals:    09/02/21 0815   BP: 115/78   BP Location: Left arm   Patient Position: Sitting   Cuff Size: Standard   Pulse: 89   Resp: 16   SpO2: 90%   Weight: 55 7 kg (122 lb 12 8 oz)   Height: 5' (1 524 m)     Body mass index is 23 98 kg/m²  Physical Exam  Vitals reviewed  Constitutional:       General: She is awake  Appearance: Normal appearance  She is well-developed and normal weight  HENT:      Head: Normocephalic and atraumatic  Nose: Nose normal       Mouth/Throat:      Mouth: Mucous membranes are moist    Eyes:      Extraocular Movements: Extraocular movements intact  Cardiovascular:      Rate and Rhythm: Normal rate and regular rhythm  Pulses: Normal pulses  Heart sounds: Normal heart sounds  Pulmonary:      Effort: Pulmonary effort is normal       Breath sounds: Examination of the right-upper field reveals wheezing  Examination of the left-upper field reveals wheezing  Examination of the right-middle field reveals wheezing  Examination of the left-middle field reveals wheezing  Examination of the right-lower field reveals wheezing  Examination of the left-lower field reveals wheezing  Wheezing present  Comments: Chronic shortness of breath  Chronic oxygen use  Abdominal:      General: Bowel sounds are normal       Palpations: Abdomen is soft  Musculoskeletal:         General: Normal range of motion  Cervical back: Normal range of motion  Right lower leg: No edema  Left lower leg: No edema  Feet:       Comments: Chronic back pains   Skin:     General: Skin is warm and dry  Neurological:      Mental Status: She is alert and oriented to person, place, and time     Psychiatric:         Attention and Perception: Attention normal          Mood and Affect: Mood normal          Speech: Speech normal          Behavior: Behavior normal  Behavior is cooperative  Suture removal    Date/Time: 9/2/2021 8:30 AM  Performed by: LEEROY Kat  Authorized by: LEEROY Kat   Universal Protocol:  Consent: Verbal consent obtained  Written consent not obtained  Risks and benefits: risks, benefits and alternatives were discussed  Consent given by: patient  Patient understanding: patient states understanding of the procedure being performed  Patient consent: the patient's understanding of the procedure matches consent given  Procedure consent: procedure consent matches procedure scheduled  Patient identity confirmed: verbally with patient        Patient location:  Clinic  Location:     Laterality:  Left    Location:  Lower extremity    Lower extremity location:  Leg    Leg location:  L lower leg  Procedure details: Tools used:  Suture removal kit    Wound appearance:  No sign(s) of infection, good wound healing and clean    Number of sutures removed:  6  Post-procedure details:     Post-removal:  Dressing applied    Patient tolerance of procedure:   Tolerated well, no immediate complications        PHQ-9 Depression Screening    PHQ-9:   Frequency of the following problems over the past two weeks:

## 2021-09-02 NOTE — ASSESSMENT & PLAN NOTE
· 6 sutures removed intact of left lower ankle  · Wound is approximated  · No redness, warmth, drainage  · May uncover when at home

## 2021-09-02 NOTE — ASSESSMENT & PLAN NOTE
Wt Readings from Last 3 Encounters:   08/24/21 55 7 kg (122 lb 12 8 oz)   08/18/21 56 2 kg (124 lb)   06/18/21 57 2 kg (126 lb)     · Continue eliquis, cardizem, lasix, toprol

## 2021-09-29 NOTE — TELEPHONE ENCOUNTER
Patient came in the office today for an echocardiogram    She is scheduled for a virtual follow up visit on 3/2/21 with Carmen Dumont  She stated that her glucose level on 2/16/21 was very elevated (357), which she attributes to eating "quite a bit of peanut brittle" the morning of the blood work  Her glucose was normal on 2/6/21  She was not instructed to be fasting  Should she have a repeat BMP done? Please advise 
[id number]

## 2021-10-01 PROBLEM — Z09 ENCOUNTER FOR FOLLOW-UP: Status: ACTIVE | Noted: 2021-01-01

## 2021-10-04 PROBLEM — R23.8 EASY BRUISING: Status: ACTIVE | Noted: 2021-01-01

## 2021-10-31 PROBLEM — Z79.899 MEDICATION MANAGEMENT: Status: ACTIVE | Noted: 2021-01-01

## 2021-11-01 PROBLEM — L65.9 HAIR LOSS: Status: ACTIVE | Noted: 2021-01-01

## 2021-11-01 PROBLEM — E87.8 POTASSIUM DISORDER: Status: ACTIVE | Noted: 2021-01-01

## 2022-01-01 ENCOUNTER — APPOINTMENT (INPATIENT)
Dept: CT IMAGING | Facility: HOSPITAL | Age: 87
DRG: 871 | End: 2022-01-01
Payer: MEDICARE

## 2022-01-01 ENCOUNTER — PATIENT OUTREACH (OUTPATIENT)
Dept: CASE MANAGEMENT | Facility: OTHER | Age: 87
End: 2022-01-01

## 2022-01-01 ENCOUNTER — TELEPHONE (OUTPATIENT)
Dept: INTERNAL MEDICINE CLINIC | Facility: CLINIC | Age: 87
End: 2022-01-01

## 2022-01-01 ENCOUNTER — APPOINTMENT (INPATIENT)
Dept: RADIOLOGY | Facility: HOSPITAL | Age: 87
DRG: 871 | End: 2022-01-01
Payer: MEDICARE

## 2022-01-01 ENCOUNTER — PATIENT OUTREACH (OUTPATIENT)
Dept: INTERNAL MEDICINE CLINIC | Facility: CLINIC | Age: 87
End: 2022-01-01

## 2022-01-01 ENCOUNTER — HOSPITAL ENCOUNTER (INPATIENT)
Facility: HOSPITAL | Age: 87
LOS: 3 days | DRG: 871 | End: 2022-01-19
Attending: EMERGENCY MEDICINE | Admitting: ANESTHESIOLOGY
Payer: MEDICARE

## 2022-01-01 ENCOUNTER — APPOINTMENT (EMERGENCY)
Dept: RADIOLOGY | Facility: HOSPITAL | Age: 87
DRG: 871 | End: 2022-01-01
Payer: MEDICARE

## 2022-01-01 ENCOUNTER — APPOINTMENT (INPATIENT)
Dept: ULTRASOUND IMAGING | Facility: HOSPITAL | Age: 87
DRG: 871 | End: 2022-01-01
Payer: MEDICARE

## 2022-01-01 ENCOUNTER — OFFICE VISIT (OUTPATIENT)
Dept: INTERNAL MEDICINE CLINIC | Facility: CLINIC | Age: 87
End: 2022-01-01
Payer: MEDICARE

## 2022-01-01 ENCOUNTER — APPOINTMENT (INPATIENT)
Dept: NON INVASIVE DIAGNOSTICS | Facility: HOSPITAL | Age: 87
DRG: 871 | End: 2022-01-01
Payer: MEDICARE

## 2022-01-01 VITALS
HEART RATE: 98 BPM | BODY MASS INDEX: 27.92 KG/M2 | DIASTOLIC BLOOD PRESSURE: 69 MMHG | RESPIRATION RATE: 11 BRPM | SYSTOLIC BLOOD PRESSURE: 125 MMHG | OXYGEN SATURATION: 95 % | WEIGHT: 142.2 LBS | HEIGHT: 60 IN | TEMPERATURE: 96.1 F

## 2022-01-01 VITALS
HEART RATE: 81 BPM | DIASTOLIC BLOOD PRESSURE: 76 MMHG | OXYGEN SATURATION: 97 % | RESPIRATION RATE: 16 BRPM | BODY MASS INDEX: 25 KG/M2 | WEIGHT: 128 LBS | SYSTOLIC BLOOD PRESSURE: 115 MMHG | TEMPERATURE: 96.5 F

## 2022-01-01 DIAGNOSIS — I48.91 ATRIAL FIBRILLATION (HCC): Primary | ICD-10-CM

## 2022-01-01 DIAGNOSIS — R53.81 DEBILITY: ICD-10-CM

## 2022-01-01 DIAGNOSIS — M54.50 CHRONIC BILATERAL LOW BACK PAIN WITHOUT SCIATICA: ICD-10-CM

## 2022-01-01 DIAGNOSIS — H35.30 MACULAR DEGENERATION OF BOTH EYES, UNSPECIFIED TYPE: ICD-10-CM

## 2022-01-01 DIAGNOSIS — F11.20 UNCOMPLICATED OPIOID DEPENDENCE (HCC): ICD-10-CM

## 2022-01-01 DIAGNOSIS — E11.9 TYPE 2 DIABETES MELLITUS WITHOUT COMPLICATION, WITHOUT LONG-TERM CURRENT USE OF INSULIN (HCC): ICD-10-CM

## 2022-01-01 DIAGNOSIS — I48.0 PAROXYSMAL ATRIAL FIBRILLATION (HCC): ICD-10-CM

## 2022-01-01 DIAGNOSIS — S22.080S CLOSED WEDGE COMPRESSION FRACTURE OF T12 VERTEBRA, SEQUELA: ICD-10-CM

## 2022-01-01 DIAGNOSIS — F41.9 CHRONIC ANXIETY: Chronic | ICD-10-CM

## 2022-01-01 DIAGNOSIS — D50.8 OTHER IRON DEFICIENCY ANEMIA: ICD-10-CM

## 2022-01-01 DIAGNOSIS — M54.16 LUMBAR RADICULOPATHY: ICD-10-CM

## 2022-01-01 DIAGNOSIS — I48.91 ATRIAL FIBRILLATION WITH RVR (HCC): ICD-10-CM

## 2022-01-01 DIAGNOSIS — K94.20 GASTROSTOMY COMPLICATION, UNSPECIFIED (HCC): ICD-10-CM

## 2022-01-01 DIAGNOSIS — M1A.9XX0 CHRONIC GOUT WITHOUT TOPHUS, UNSPECIFIED CAUSE, UNSPECIFIED SITE: ICD-10-CM

## 2022-01-01 DIAGNOSIS — J43.8 OTHER EMPHYSEMA (HCC): ICD-10-CM

## 2022-01-01 DIAGNOSIS — I10 ESSENTIAL HYPERTENSION: ICD-10-CM

## 2022-01-01 DIAGNOSIS — K21.9 GASTROESOPHAGEAL REFLUX DISEASE WITHOUT ESOPHAGITIS: ICD-10-CM

## 2022-01-01 DIAGNOSIS — M81.0 OSTEOPOROSIS, UNSPECIFIED OSTEOPOROSIS TYPE, UNSPECIFIED PATHOLOGICAL FRACTURE PRESENCE: ICD-10-CM

## 2022-01-01 DIAGNOSIS — J44.9 COPD (CHRONIC OBSTRUCTIVE PULMONARY DISEASE) (HCC): ICD-10-CM

## 2022-01-01 DIAGNOSIS — J34.2 DEVIATED NASAL SEPTUM: ICD-10-CM

## 2022-01-01 DIAGNOSIS — J96.11 CHRONIC RESPIRATORY FAILURE WITH HYPOXIA (HCC): Primary | ICD-10-CM

## 2022-01-01 DIAGNOSIS — G89.4 CHRONIC PAIN SYNDROME: ICD-10-CM

## 2022-01-01 DIAGNOSIS — I50.32 CHRONIC DIASTOLIC CHF (CONGESTIVE HEART FAILURE) (HCC): ICD-10-CM

## 2022-01-01 DIAGNOSIS — S32.040S COMPRESSION FRACTURE OF L4 VERTEBRA, SEQUELA: ICD-10-CM

## 2022-01-01 DIAGNOSIS — L65.9 HAIR LOSS: ICD-10-CM

## 2022-01-01 DIAGNOSIS — R74.01 TRANSAMINITIS: ICD-10-CM

## 2022-01-01 DIAGNOSIS — R23.8 EASY BRUISING: ICD-10-CM

## 2022-01-01 DIAGNOSIS — R26.2 AMBULATORY DYSFUNCTION: ICD-10-CM

## 2022-01-01 DIAGNOSIS — M46.1 SACROILIITIS (HCC): ICD-10-CM

## 2022-01-01 DIAGNOSIS — N30.20 CHRONIC CYSTITIS: ICD-10-CM

## 2022-01-01 DIAGNOSIS — R06.02 SHORTNESS OF BREATH: ICD-10-CM

## 2022-01-01 DIAGNOSIS — J41.0 SIMPLE CHRONIC BRONCHITIS (HCC): ICD-10-CM

## 2022-01-01 DIAGNOSIS — G89.29 CHRONIC BILATERAL LOW BACK PAIN WITHOUT SCIATICA: ICD-10-CM

## 2022-01-01 DIAGNOSIS — I48.91 ATRIAL FIBRILLATION WITH RAPID VENTRICULAR RESPONSE (HCC): ICD-10-CM

## 2022-01-01 LAB
4HR DELTA HS TROPONIN: -2 NG/L
ALBUMIN SERPL BCP-MCNC: 3.1 G/DL (ref 3.5–5)
ALBUMIN SERPL BCP-MCNC: 3.1 G/DL (ref 3.5–5)
ALBUMIN SERPL BCP-MCNC: 3.2 G/DL (ref 3.5–5)
ALP SERPL-CCNC: 129 U/L (ref 34–104)
ALP SERPL-CCNC: 233 U/L (ref 34–104)
ALP SERPL-CCNC: 305 U/L (ref 34–104)
ALT SERPL W P-5'-P-CCNC: 2413 U/L (ref 7–52)
ALT SERPL W P-5'-P-CCNC: 2915 U/L (ref 7–52)
ALT SERPL W P-5'-P-CCNC: 2925 U/L (ref 7–52)
AMMONIA PLAS-SCNC: 139 UMOL/L (ref 18–72)
ANION GAP SERPL CALCULATED.3IONS-SCNC: 15 MMOL/L (ref 4–13)
ANION GAP SERPL CALCULATED.3IONS-SCNC: 17 MMOL/L (ref 4–13)
ANION GAP SERPL CALCULATED.3IONS-SCNC: 17 MMOL/L (ref 4–13)
ANION GAP SERPL CALCULATED.3IONS-SCNC: 21 MMOL/L (ref 4–13)
ANISOCYTOSIS BLD QL SMEAR: PRESENT
ANISOCYTOSIS BLD QL SMEAR: PRESENT
AORTIC ROOT: 2.8 CM
AORTIC VALVE MEAN VELOCITY: 15.5 M/S
APAP SERPL-MCNC: <10 UG/ML (ref 10–20)
APICAL FOUR CHAMBER EJECTION FRACTION: 60 %
APTT PPP: 159 SECONDS (ref 23–37)
APTT PPP: 25 SECONDS (ref 23–37)
APTT PPP: 30 SECONDS (ref 23–37)
APTT PPP: 77 SECONDS (ref 23–37)
APTT PPP: 80 SECONDS (ref 23–37)
ARTERIAL PATENCY WRIST A: YES
ASCENDING AORTA: 3.5 CM
AST SERPL W P-5'-P-CCNC: 1902 U/L (ref 13–39)
AST SERPL W P-5'-P-CCNC: 2440 U/L (ref 13–39)
AST SERPL W P-5'-P-CCNC: 3989 U/L (ref 13–39)
ATRIAL RATE: 147 BPM
ATRIAL RATE: 163 BPM
ATRIAL RATE: 74 BPM
ATRIAL RATE: 82 BPM
AV AREA BY CONTINUOUS VTI: 1.4 CM2
AV AREA PEAK VELOCITY: 1.4 CM2
AV LVOT MEAN GRADIENT: 2 MMHG
AV LVOT PEAK GRADIENT: 4 MMHG
AV MEAN GRADIENT: 11 MMHG
AV PEAK GRADIENT: 17 MMHG
AV REGURGITATION PRESSURE HALF TIME: 0.49 MS
AV VALVE AREA: 1.4 CM2
AV VELOCITY RATIO: 0.51
BACTERIA UR CULT: NORMAL
BACTERIA UR QL AUTO: ABNORMAL /HPF
BASE EXCESS BLDA CALC-SCNC: -3.3 MMOL/L
BASOPHILS # BLD MANUAL: 0 THOUSAND/UL (ref 0–0.1)
BASOPHILS NFR MAR MANUAL: 0 % (ref 0–1)
BETA-HYDROXYBUTYRATE: 0.3 MMOL/L
BILIRUB SERPL-MCNC: 1.41 MG/DL (ref 0.2–1)
BILIRUB SERPL-MCNC: 1.7 MG/DL (ref 0.2–1)
BILIRUB SERPL-MCNC: 2.71 MG/DL (ref 0.2–1)
BILIRUB UR QL STRIP: NEGATIVE
BNP SERPL-MCNC: 742 PG/ML (ref 1–100)
BUN SERPL-MCNC: 44 MG/DL (ref 5–25)
BUN SERPL-MCNC: 50 MG/DL (ref 5–25)
BUN SERPL-MCNC: 53 MG/DL (ref 5–25)
BUN SERPL-MCNC: 62 MG/DL (ref 5–25)
CALCIUM ALBUM COR SERPL-MCNC: 8.1 MG/DL (ref 8.3–10.1)
CALCIUM ALBUM COR SERPL-MCNC: 8.1 MG/DL (ref 8.3–10.1)
CALCIUM ALBUM COR SERPL-MCNC: 8.2 MG/DL (ref 8.3–10.1)
CALCIUM SERPL-MCNC: 10.3 MG/DL (ref 8.4–10.2)
CALCIUM SERPL-MCNC: 7.4 MG/DL (ref 8.4–10.2)
CALCIUM SERPL-MCNC: 7.5 MG/DL (ref 8.4–10.2)
CALCIUM SERPL-MCNC: 7.5 MG/DL (ref 8.4–10.2)
CARDIAC TROPONIN I PNL SERPL HS: 30 NG/L
CARDIAC TROPONIN I PNL SERPL HS: 32 NG/L
CHLORIDE SERPL-SCNC: 87 MMOL/L (ref 96–108)
CHLORIDE SERPL-SCNC: 89 MMOL/L (ref 96–108)
CHLORIDE SERPL-SCNC: 91 MMOL/L (ref 96–108)
CHLORIDE SERPL-SCNC: 91 MMOL/L (ref 96–108)
CLARITY UR: ABNORMAL
CO2 SERPL-SCNC: 20 MMOL/L (ref 21–32)
CO2 SERPL-SCNC: 22 MMOL/L (ref 21–32)
CO2 SERPL-SCNC: 26 MMOL/L (ref 21–32)
CO2 SERPL-SCNC: 26 MMOL/L (ref 21–32)
COLOR UR: YELLOW
CREAT SERPL-MCNC: 1.34 MG/DL (ref 0.6–1.3)
CREAT SERPL-MCNC: 1.48 MG/DL (ref 0.6–1.3)
CREAT SERPL-MCNC: 1.64 MG/DL (ref 0.6–1.3)
CREAT SERPL-MCNC: 1.97 MG/DL (ref 0.6–1.3)
DOP CALC AO PEAK VEL: 2.06 M/S
DOP CALC AO VTI: 45.8 CM
DOP CALC LVOT AREA: 2.83 CM2
DOP CALC LVOT DIAMETER: 1.9 CM
DOP CALC LVOT PEAK VEL VTI: 22.65 CM
DOP CALC LVOT PEAK VEL: 1.05 M/S
DOP CALC LVOT STROKE INDEX: 39.1 ML/M2
DOP CALC LVOT STROKE VOLUME: 64.19 CM3
E WAVE DECELERATION TIME: 218 MS
EOSINOPHIL # BLD MANUAL: 0 THOUSAND/UL (ref 0–0.4)
EOSINOPHIL NFR BLD MANUAL: 0 % (ref 0–6)
ERYTHROCYTE [DISTWIDTH] IN BLOOD BY AUTOMATED COUNT: 20.4 % (ref 11.6–15.1)
ERYTHROCYTE [DISTWIDTH] IN BLOOD BY AUTOMATED COUNT: 20.4 % (ref 11.6–15.1)
ERYTHROCYTE [DISTWIDTH] IN BLOOD BY AUTOMATED COUNT: 20.9 % (ref 11.6–15.1)
EST. AVERAGE GLUCOSE BLD GHB EST-MCNC: 217 MG/DL
FERRITIN SERPL-MCNC: 114 NG/ML (ref 8–388)
FIBRINOGEN PPP-MCNC: 291 MG/DL (ref 227–495)
FLUAV RNA RESP QL NAA+PROBE: NEGATIVE
FLUBV RNA RESP QL NAA+PROBE: NEGATIVE
FRACTIONAL SHORTENING: 34 % (ref 28–44)
GFR SERPL CREATININE-BSD FRML MDRD: 22 ML/MIN/1.73SQ M
GFR SERPL CREATININE-BSD FRML MDRD: 28 ML/MIN/1.73SQ M
GFR SERPL CREATININE-BSD FRML MDRD: 32 ML/MIN/1.73SQ M
GFR SERPL CREATININE-BSD FRML MDRD: 36 ML/MIN/1.73SQ M
GLUCOSE SERPL-MCNC: 103 MG/DL (ref 65–140)
GLUCOSE SERPL-MCNC: 105 MG/DL (ref 65–140)
GLUCOSE SERPL-MCNC: 112 MG/DL (ref 65–140)
GLUCOSE SERPL-MCNC: 112 MG/DL (ref 65–140)
GLUCOSE SERPL-MCNC: 114 MG/DL (ref 65–140)
GLUCOSE SERPL-MCNC: 114 MG/DL (ref 65–140)
GLUCOSE SERPL-MCNC: 115 MG/DL (ref 65–140)
GLUCOSE SERPL-MCNC: 124 MG/DL (ref 65–140)
GLUCOSE SERPL-MCNC: 125 MG/DL (ref 65–140)
GLUCOSE SERPL-MCNC: 130 MG/DL (ref 65–140)
GLUCOSE SERPL-MCNC: 154 MG/DL (ref 65–140)
GLUCOSE SERPL-MCNC: 156 MG/DL (ref 65–140)
GLUCOSE SERPL-MCNC: 158 MG/DL (ref 65–140)
GLUCOSE SERPL-MCNC: 178 MG/DL (ref 65–140)
GLUCOSE SERPL-MCNC: 188 MG/DL (ref 65–140)
GLUCOSE SERPL-MCNC: 191 MG/DL (ref 65–140)
GLUCOSE SERPL-MCNC: 194 MG/DL (ref 65–140)
GLUCOSE SERPL-MCNC: 199 MG/DL (ref 65–140)
GLUCOSE SERPL-MCNC: 199 MG/DL (ref 65–140)
GLUCOSE SERPL-MCNC: 204 MG/DL (ref 65–140)
GLUCOSE SERPL-MCNC: 205 MG/DL (ref 65–140)
GLUCOSE SERPL-MCNC: 207 MG/DL (ref 65–140)
GLUCOSE SERPL-MCNC: 220 MG/DL (ref 65–140)
GLUCOSE SERPL-MCNC: 244 MG/DL (ref 65–140)
GLUCOSE SERPL-MCNC: 273 MG/DL (ref 65–140)
GLUCOSE SERPL-MCNC: 305 MG/DL (ref 65–140)
GLUCOSE SERPL-MCNC: 354 MG/DL (ref 65–140)
GLUCOSE SERPL-MCNC: 414 MG/DL (ref 65–140)
GLUCOSE SERPL-MCNC: 419 MG/DL (ref 65–140)
GLUCOSE SERPL-MCNC: 466 MG/DL (ref 65–140)
GLUCOSE SERPL-MCNC: 82 MG/DL (ref 65–140)
GLUCOSE SERPL-MCNC: 89 MG/DL (ref 65–140)
GLUCOSE SERPL-MCNC: 91 MG/DL (ref 65–140)
GLUCOSE UR STRIP-MCNC: ABNORMAL MG/DL
HAV IGM SER QL: NORMAL
HBA1C MFR BLD: 9.2 %
HBV CORE IGM SER QL: NORMAL
HBV SURFACE AG SER QL: NORMAL
HCO3 BLDA-SCNC: 21.4 MMOL/L (ref 22–28)
HCT VFR BLD AUTO: 26.3 % (ref 34.8–46.1)
HCT VFR BLD AUTO: 27.7 % (ref 34.8–46.1)
HCT VFR BLD AUTO: 31.5 % (ref 34.8–46.1)
HCV AB SER QL: NORMAL
HGB BLD-MCNC: 7.5 G/DL (ref 11.5–15.4)
HGB BLD-MCNC: 7.5 G/DL (ref 11.5–15.4)
HGB BLD-MCNC: 8.9 G/DL (ref 11.5–15.4)
HGB RETIC QN AUTO: 18.5 PG (ref 30–38.3)
HGB UR QL STRIP.AUTO: ABNORMAL
HYALINE CASTS #/AREA URNS LPF: ABNORMAL /LPF
HYPERCHROMIA BLD QL SMEAR: PRESENT
IMM RETICS NFR: 41 % (ref 0–14)
INR PPP: 1.15 (ref 0.84–1.19)
INR PPP: 2.08 (ref 0.84–1.19)
INTERVENTRICULAR SEPTUM IN DIASTOLE (PARASTERNAL SHORT AXIS VIEW): 1.1 CM
IRON SATN MFR SERPL: 6 % (ref 15–50)
IRON SERPL-MCNC: 21 UG/DL (ref 50–170)
KETONES UR STRIP-MCNC: ABNORMAL MG/DL
L PNEUMO1 AG UR QL IA.RAPID: NEGATIVE
LACTATE SERPL-SCNC: 10.4 MMOL/L (ref 0.5–2)
LACTATE SERPL-SCNC: 12.6 MMOL/L (ref 0.5–2)
LACTATE SERPL-SCNC: 2.8 MMOL/L (ref 0.5–2)
LACTATE SERPL-SCNC: 3.5 MMOL/L (ref 0.5–2)
LACTATE SERPL-SCNC: 3.7 MMOL/L (ref 0.5–2)
LACTATE SERPL-SCNC: 4.9 MMOL/L (ref 0.5–2)
LACTATE SERPL-SCNC: 6.4 MMOL/L (ref 0.5–2)
LACTATE SERPL-SCNC: 6.6 MMOL/L (ref 0.5–2)
LACTATE SERPL-SCNC: 7.6 MMOL/L (ref 0.5–2)
LACTATE SERPL-SCNC: 8.2 MMOL/L (ref 0.5–2)
LACTATE SERPL-SCNC: 9.4 MMOL/L (ref 0.5–2)
LEFT ATRIUM AREA SYSTOLE SINGLE PLANE A4C: 16 CM2
LEFT ATRIUM SIZE: 3.8 CM
LEFT INTERNAL DIMENSION IN SYSTOLE: 3.1 CM (ref 2.1–4)
LEFT VENTRICULAR INTERNAL DIMENSION IN DIASTOLE: 4.7 CM (ref 3.72–5.54)
LEFT VENTRICULAR POSTERIOR WALL IN END DIASTOLE: 1.1 CM
LEFT VENTRICULAR STROKE VOLUME: 66 ML
LEUKOCYTE ESTERASE UR QL STRIP: ABNORMAL
LYMPHOCYTES # BLD AUTO: 0.65 THOUSAND/UL (ref 0.6–4.47)
LYMPHOCYTES # BLD AUTO: 4 % (ref 14–44)
LYMPHOCYTES NFR BLD: 5 % (ref 14–44)
MAGNESIUM SERPL-MCNC: 2.2 MG/DL (ref 1.9–2.7)
MAGNESIUM SERPL-MCNC: 2.7 MG/DL (ref 1.9–2.7)
MCH RBC QN AUTO: 18.5 PG (ref 26.8–34.3)
MCH RBC QN AUTO: 18.9 PG (ref 26.8–34.3)
MCH RBC QN AUTO: 18.9 PG (ref 26.8–34.3)
MCHC RBC AUTO-ENTMCNC: 27.1 G/DL (ref 31.4–37.4)
MCHC RBC AUTO-ENTMCNC: 28.3 G/DL (ref 31.4–37.4)
MCHC RBC AUTO-ENTMCNC: 28.5 G/DL (ref 31.4–37.4)
MCV RBC AUTO: 66 FL (ref 82–98)
MCV RBC AUTO: 67 FL (ref 82–98)
MCV RBC AUTO: 68 FL (ref 82–98)
MONOCYTES # BLD AUTO: 0.32 THOUSAND/UL (ref 0–1.22)
MONOCYTES NFR BLD AUTO: 3 % (ref 4–12)
MONOCYTES NFR BLD: 2 % (ref 4–12)
MRSA NOSE QL CULT: ABNORMAL
MUCOUS THREADS UR QL AUTO: ABNORMAL
MV PEAK A VEL: 1.32 M/S
MV PEAK E VEL: 109 CM/S
MV STENOSIS PRESSURE HALF TIME: 0 MS
NASAL CANNULA: 15
NEUTROPHILS # BLD MANUAL: 15.22 THOUSAND/UL (ref 1.85–7.62)
NEUTS BAND NFR BLD MANUAL: 3 % (ref 0–8)
NEUTS BAND NFR BLD MANUAL: 7 THOUSAND/UL
NEUTS SEG NFR BLD AUTO: 85 % (ref 45–77)
NEUTS SEG NFR BLD AUTO: 91 % (ref 43–75)
NITRITE UR QL STRIP: NEGATIVE
NON VENT TYPE- NRB: 15
NON-SQ EPI CELLS URNS QL MICRO: ABNORMAL /HPF
O2 CT BLDA-SCNC: 12.6 ML/DL (ref 16–23)
OVALOCYTES BLD QL SMEAR: PRESENT
OVALOCYTES BLD QL SMEAR: PRESENT
OXYHGB MFR BLDA: 98.1 % (ref 94–97)
P AXIS: 53 DEGREES
P AXIS: 80 DEGREES
PCO2 BLDA: 36.8 MM HG (ref 36–44)
PH BLDA: 7.38 [PH] (ref 7.35–7.45)
PH UR STRIP.AUTO: 6 [PH]
PHOSPHATE SERPL-MCNC: 5 MG/DL (ref 2.3–4.1)
PLATELET # BLD AUTO: 216 THOUSANDS/UL (ref 149–390)
PLATELET # BLD AUTO: 313 THOUSANDS/UL (ref 149–390)
PLATELET # BLD AUTO: 64 THOUSANDS/UL (ref 149–390)
PLATELET BLD QL SMEAR: ADEQUATE
PLATELET BLD QL SMEAR: ADEQUATE
PMV BLD AUTO: 10.5 FL (ref 8.9–12.7)
PMV BLD AUTO: 9.7 FL (ref 8.9–12.7)
PO2 BLDA: 244.7 MM HG (ref 75–129)
POIKILOCYTOSIS BLD QL SMEAR: PRESENT
POIKILOCYTOSIS BLD QL SMEAR: PRESENT
POLYCHROMASIA BLD QL SMEAR: PRESENT
POLYCHROMASIA BLD QL SMEAR: PRESENT
POTASSIUM SERPL-SCNC: 3.6 MMOL/L (ref 3.5–5.3)
POTASSIUM SERPL-SCNC: 4 MMOL/L (ref 3.5–5.3)
POTASSIUM SERPL-SCNC: 4.4 MMOL/L (ref 3.5–5.3)
POTASSIUM SERPL-SCNC: 4.7 MMOL/L (ref 3.5–5.3)
PR INTERVAL: 146 MS
PR INTERVAL: 178 MS
PROCALCITONIN SERPL-MCNC: 0.21 NG/ML
PROCALCITONIN SERPL-MCNC: 1.94 NG/ML
PROCALCITONIN SERPL-MCNC: 4.64 NG/ML
PROT SERPL-MCNC: 5 G/DL (ref 6.4–8.4)
PROT SERPL-MCNC: 5.1 G/DL (ref 6.4–8.4)
PROT SERPL-MCNC: 5.2 G/DL (ref 6.4–8.4)
PROT UR STRIP-MCNC: NEGATIVE MG/DL
PROTHROMBIN TIME: 14.6 SECONDS (ref 11.6–14.5)
PROTHROMBIN TIME: 22.9 SECONDS (ref 11.6–14.5)
QRS AXIS: -19 DEGREES
QRS AXIS: -37 DEGREES
QRS AXIS: -51 DEGREES
QRS AXIS: -52 DEGREES
QRSD INTERVAL: 80 MS
QRSD INTERVAL: 86 MS
QRSD INTERVAL: 88 MS
QRSD INTERVAL: 90 MS
QT INTERVAL: 276 MS
QT INTERVAL: 282 MS
QT INTERVAL: 402 MS
QT INTERVAL: 414 MS
QTC INTERVAL: 436 MS
QTC INTERVAL: 457 MS
QTC INTERVAL: 459 MS
QTC INTERVAL: 469 MS
RBC # BLD AUTO: 3.96 MILLION/UL (ref 3.81–5.12)
RBC # BLD AUTO: 4.06 MILLION/UL (ref 3.81–5.12)
RBC # BLD AUTO: 4.71 MILLION/UL (ref 3.81–5.12)
RBC #/AREA URNS AUTO: ABNORMAL /HPF
RBC MORPH BLD: PRESENT
RBC MORPH BLD: PRESENT
RETICS # AUTO: ABNORMAL 10*3/UL (ref 14097–95744)
RETICS # CALC: 2.74 % (ref 0.37–1.87)
RIGHT ATRIUM AREA SYSTOLE A4C: 13.6 CM2
RIGHT VENTRICLE ID DIMENSION: 2.4 CM
RSV RNA RESP QL NAA+PROBE: NEGATIVE
S PNEUM AG UR QL: NEGATIVE
SALICYLATES SERPL-MCNC: <5 MG/DL (ref 3–20)
SARS-COV-2 RNA RESP QL NAA+PROBE: NEGATIVE
SL CV AV DECELERATION TIME RETROGRADE: 1697 MS
SL CV AV PEAK GRADIENT RETROGRADE: 57 MMHG
SL CV LV EF: 55
SL CV PED ECHO LEFT VENTRICLE DIASTOLIC VOLUME (MOD BIPLANE) 2D: 104 ML
SL CV PED ECHO LEFT VENTRICLE SYSTOLIC VOLUME (MOD BIPLANE) 2D: 39 ML
SODIUM SERPL-SCNC: 130 MMOL/L (ref 135–147)
SODIUM SERPL-SCNC: 132 MMOL/L (ref 135–147)
SP GR UR STRIP.AUTO: 1.02 (ref 1–1.03)
SPECIMEN SOURCE: ABNORMAL
T WAVE AXIS: 112 DEGREES
T WAVE AXIS: 160 DEGREES
T WAVE AXIS: 72 DEGREES
T WAVE AXIS: 81 DEGREES
TIBC SERPL-MCNC: 351 UG/DL (ref 250–450)
TOTAL CELLS COUNTED SPEC: 100
TR MAX PG: 39 MMHG
TRICUSPID VALVE PEAK REGURGITATION VELOCITY: 3.11 M/S
UROBILINOGEN UR QL STRIP.AUTO: 0.2 E.U./DL
VENTRICULAR RATE: 144 BPM
VENTRICULAR RATE: 165 BPM
VENTRICULAR RATE: 74 BPM
VENTRICULAR RATE: 82 BPM
WBC # BLD AUTO: 13.68 THOUSAND/UL (ref 4.31–10.16)
WBC # BLD AUTO: 16.19 THOUSAND/UL (ref 4.31–10.16)
WBC # BLD AUTO: 23.43 THOUSAND/UL (ref 4.31–10.16)
WBC #/AREA URNS AUTO: ABNORMAL /HPF
Z-SCORE OF LEFT VENTRICULAR DIMENSION IN END SYSTOLE: 0.35

## 2022-01-01 PROCEDURE — 99291 CRITICAL CARE FIRST HOUR: CPT | Performed by: INTERNAL MEDICINE

## 2022-01-01 PROCEDURE — 84145 PROCALCITONIN (PCT): CPT | Performed by: EMERGENCY MEDICINE

## 2022-01-01 PROCEDURE — 96366 THER/PROPH/DIAG IV INF ADDON: CPT

## 2022-01-01 PROCEDURE — 85027 COMPLETE CBC AUTOMATED: CPT | Performed by: NURSE PRACTITIONER

## 2022-01-01 PROCEDURE — 83605 ASSAY OF LACTIC ACID: CPT | Performed by: ANESTHESIOLOGY

## 2022-01-01 PROCEDURE — 87449 NOS EACH ORGANISM AG IA: CPT | Performed by: NURSE PRACTITIONER

## 2022-01-01 PROCEDURE — 85730 THROMBOPLASTIN TIME PARTIAL: CPT | Performed by: NURSE PRACTITIONER

## 2022-01-01 PROCEDURE — 83605 ASSAY OF LACTIC ACID: CPT | Performed by: PHYSICIAN ASSISTANT

## 2022-01-01 PROCEDURE — 83605 ASSAY OF LACTIC ACID: CPT | Performed by: EMERGENCY MEDICINE

## 2022-01-01 PROCEDURE — 82948 REAGENT STRIP/BLOOD GLUCOSE: CPT

## 2022-01-01 PROCEDURE — 80053 COMPREHEN METABOLIC PANEL: CPT | Performed by: NURSE PRACTITIONER

## 2022-01-01 PROCEDURE — 83540 ASSAY OF IRON: CPT | Performed by: NURSE PRACTITIONER

## 2022-01-01 PROCEDURE — 94760 N-INVAS EAR/PLS OXIMETRY 1: CPT

## 2022-01-01 PROCEDURE — 36415 COLL VENOUS BLD VENIPUNCTURE: CPT | Performed by: EMERGENCY MEDICINE

## 2022-01-01 PROCEDURE — 84145 PROCALCITONIN (PCT): CPT | Performed by: NURSE PRACTITIONER

## 2022-01-01 PROCEDURE — 94640 AIRWAY INHALATION TREATMENT: CPT

## 2022-01-01 PROCEDURE — 93306 TTE W/DOPPLER COMPLETE: CPT

## 2022-01-01 PROCEDURE — 82010 KETONE BODYS QUAN: CPT | Performed by: NURSE PRACTITIONER

## 2022-01-01 PROCEDURE — 96375 TX/PRO/DX INJ NEW DRUG ADDON: CPT

## 2022-01-01 PROCEDURE — 80048 BASIC METABOLIC PNL TOTAL CA: CPT | Performed by: EMERGENCY MEDICINE

## 2022-01-01 PROCEDURE — 93005 ELECTROCARDIOGRAM TRACING: CPT

## 2022-01-01 PROCEDURE — 71045 X-RAY EXAM CHEST 1 VIEW: CPT

## 2022-01-01 PROCEDURE — 84100 ASSAY OF PHOSPHORUS: CPT | Performed by: NURSE PRACTITIONER

## 2022-01-01 PROCEDURE — 85007 BL SMEAR W/DIFF WBC COUNT: CPT | Performed by: EMERGENCY MEDICINE

## 2022-01-01 PROCEDURE — 93306 TTE W/DOPPLER COMPLETE: CPT | Performed by: INTERNAL MEDICINE

## 2022-01-01 PROCEDURE — 85384 FIBRINOGEN ACTIVITY: CPT | Performed by: PHYSICIAN ASSISTANT

## 2022-01-01 PROCEDURE — 0241U HB NFCT DS VIR RESP RNA 4 TRGT: CPT | Performed by: EMERGENCY MEDICINE

## 2022-01-01 PROCEDURE — 83880 ASSAY OF NATRIURETIC PEPTIDE: CPT | Performed by: EMERGENCY MEDICINE

## 2022-01-01 PROCEDURE — 80179 DRUG ASSAY SALICYLATE: CPT | Performed by: NURSE PRACTITIONER

## 2022-01-01 PROCEDURE — 83605 ASSAY OF LACTIC ACID: CPT | Performed by: NURSE PRACTITIONER

## 2022-01-01 PROCEDURE — 96372 THER/PROPH/DIAG INJ SC/IM: CPT | Performed by: NURSE PRACTITIONER

## 2022-01-01 PROCEDURE — 93010 ELECTROCARDIOGRAM REPORT: CPT | Performed by: INTERNAL MEDICINE

## 2022-01-01 PROCEDURE — 85046 RETICYTE/HGB CONCENTRATE: CPT | Performed by: NURSE PRACTITIONER

## 2022-01-01 PROCEDURE — 85730 THROMBOPLASTIN TIME PARTIAL: CPT | Performed by: EMERGENCY MEDICINE

## 2022-01-01 PROCEDURE — 80074 ACUTE HEPATITIS PANEL: CPT | Performed by: NURSE PRACTITIONER

## 2022-01-01 PROCEDURE — 99222 1ST HOSP IP/OBS MODERATE 55: CPT | Performed by: INTERNAL MEDICINE

## 2022-01-01 PROCEDURE — 96368 THER/DIAG CONCURRENT INF: CPT

## 2022-01-01 PROCEDURE — 76705 ECHO EXAM OF ABDOMEN: CPT

## 2022-01-01 PROCEDURE — 85610 PROTHROMBIN TIME: CPT | Performed by: EMERGENCY MEDICINE

## 2022-01-01 PROCEDURE — 36556 INSERT NON-TUNNEL CV CATH: CPT | Performed by: ANESTHESIOLOGY

## 2022-01-01 PROCEDURE — 84484 ASSAY OF TROPONIN QUANT: CPT | Performed by: EMERGENCY MEDICINE

## 2022-01-01 PROCEDURE — NC001 PR NO CHARGE: Performed by: NURSE PRACTITIONER

## 2022-01-01 PROCEDURE — 02HV33Z INSERTION OF INFUSION DEVICE INTO SUPERIOR VENA CAVA, PERCUTANEOUS APPROACH: ICD-10-PCS | Performed by: INTERNAL MEDICINE

## 2022-01-01 PROCEDURE — 83550 IRON BINDING TEST: CPT | Performed by: NURSE PRACTITIONER

## 2022-01-01 PROCEDURE — 99214 OFFICE O/P EST MOD 30 MIN: CPT | Performed by: NURSE PRACTITIONER

## 2022-01-01 PROCEDURE — 83735 ASSAY OF MAGNESIUM: CPT | Performed by: EMERGENCY MEDICINE

## 2022-01-01 PROCEDURE — 87081 CULTURE SCREEN ONLY: CPT | Performed by: PHYSICIAN ASSISTANT

## 2022-01-01 PROCEDURE — 87040 BLOOD CULTURE FOR BACTERIA: CPT | Performed by: EMERGENCY MEDICINE

## 2022-01-01 PROCEDURE — 96365 THER/PROPH/DIAG IV INF INIT: CPT

## 2022-01-01 PROCEDURE — 74018 RADEX ABDOMEN 1 VIEW: CPT

## 2022-01-01 PROCEDURE — 81001 URINALYSIS AUTO W/SCOPE: CPT | Performed by: NURSE PRACTITIONER

## 2022-01-01 PROCEDURE — 99292 CRITICAL CARE ADDL 30 MIN: CPT | Performed by: INTERNAL MEDICINE

## 2022-01-01 PROCEDURE — 74177 CT ABD & PELVIS W/CONTRAST: CPT

## 2022-01-01 PROCEDURE — 85007 BL SMEAR W/DIFF WBC COUNT: CPT | Performed by: PHYSICIAN ASSISTANT

## 2022-01-01 PROCEDURE — NC001 PR NO CHARGE: Performed by: INTERNAL MEDICINE

## 2022-01-01 PROCEDURE — 82728 ASSAY OF FERRITIN: CPT | Performed by: NURSE PRACTITIONER

## 2022-01-01 PROCEDURE — 99238 HOSP IP/OBS DSCHRG MGMT 30/<: CPT | Performed by: NURSE PRACTITIONER

## 2022-01-01 PROCEDURE — 85027 COMPLETE CBC AUTOMATED: CPT | Performed by: EMERGENCY MEDICINE

## 2022-01-01 PROCEDURE — 99291 CRITICAL CARE FIRST HOUR: CPT | Performed by: NURSE PRACTITIONER

## 2022-01-01 PROCEDURE — 82140 ASSAY OF AMMONIA: CPT | Performed by: NURSE PRACTITIONER

## 2022-01-01 PROCEDURE — 80143 DRUG ASSAY ACETAMINOPHEN: CPT | Performed by: NURSE PRACTITIONER

## 2022-01-01 PROCEDURE — 71260 CT THORAX DX C+: CPT

## 2022-01-01 PROCEDURE — 82805 BLOOD GASES W/O2 SATURATION: CPT | Performed by: NURSE PRACTITIONER

## 2022-01-01 PROCEDURE — 87086 URINE CULTURE/COLONY COUNT: CPT | Performed by: NURSE PRACTITIONER

## 2022-01-01 PROCEDURE — 99291 CRITICAL CARE FIRST HOUR: CPT | Performed by: EMERGENCY MEDICINE

## 2022-01-01 PROCEDURE — 85610 PROTHROMBIN TIME: CPT | Performed by: NURSE PRACTITIONER

## 2022-01-01 PROCEDURE — 83036 HEMOGLOBIN GLYCOSYLATED A1C: CPT | Performed by: NURSE PRACTITIONER

## 2022-01-01 PROCEDURE — 83735 ASSAY OF MAGNESIUM: CPT | Performed by: NURSE PRACTITIONER

## 2022-01-01 PROCEDURE — NC001 PR NO CHARGE: Performed by: ANESTHESIOLOGY

## 2022-01-01 PROCEDURE — 99285 EMERGENCY DEPT VISIT HI MDM: CPT

## 2022-01-01 PROCEDURE — 99232 SBSQ HOSP IP/OBS MODERATE 35: CPT | Performed by: INTERNAL MEDICINE

## 2022-01-01 RX ORDER — FENTANYL CITRATE 50 UG/ML
50 INJECTION, SOLUTION INTRAMUSCULAR; INTRAVENOUS
Status: DISCONTINUED | OUTPATIENT
Start: 2022-01-01 | End: 2022-01-01 | Stop reason: HOSPADM

## 2022-01-01 RX ORDER — METHYLPREDNISOLONE SODIUM SUCCINATE 125 MG/2ML
65 INJECTION, POWDER, LYOPHILIZED, FOR SOLUTION INTRAMUSCULAR; INTRAVENOUS ONCE
Status: COMPLETED | OUTPATIENT
Start: 2022-01-01 | End: 2022-01-01

## 2022-01-01 RX ORDER — METOPROLOL TARTRATE 5 MG/5ML
INJECTION INTRAVENOUS
Status: COMPLETED
Start: 2022-01-01 | End: 2022-01-01

## 2022-01-01 RX ORDER — METRONIDAZOLE 500 MG/1
500 TABLET ORAL EVERY 8 HOURS SCHEDULED
Status: DISCONTINUED | OUTPATIENT
Start: 2022-01-01 | End: 2022-01-01

## 2022-01-01 RX ORDER — IPRATROPIUM BROMIDE 17 UG/1
2 AEROSOL, METERED RESPIRATORY (INHALATION) EVERY 6 HOURS
Qty: 38.7 G | Refills: 5 | Status: SHIPPED | OUTPATIENT
Start: 2022-01-01 | End: 2022-01-01 | Stop reason: HOSPADM

## 2022-01-01 RX ORDER — HEPARIN SODIUM 10000 [USP'U]/100ML
3-20 INJECTION, SOLUTION INTRAVENOUS
Status: DISCONTINUED | OUTPATIENT
Start: 2022-01-01 | End: 2022-01-01

## 2022-01-01 RX ORDER — ACETAMINOPHEN 325 MG/1
650 TABLET ORAL EVERY 6 HOURS PRN
Status: DISCONTINUED | OUTPATIENT
Start: 2022-01-01 | End: 2022-01-01

## 2022-01-01 RX ORDER — ASPIRIN 81 MG/1
81 TABLET ORAL WEEKLY
Status: DISCONTINUED | OUTPATIENT
Start: 2022-01-01 | End: 2022-01-01

## 2022-01-01 RX ORDER — MONTELUKAST SODIUM 10 MG/1
10 TABLET ORAL
Status: DISCONTINUED | OUTPATIENT
Start: 2022-01-01 | End: 2022-01-01

## 2022-01-01 RX ORDER — METHYLPREDNISOLONE SODIUM SUCCINATE 40 MG/ML
40 INJECTION, POWDER, LYOPHILIZED, FOR SOLUTION INTRAMUSCULAR; INTRAVENOUS EVERY 8 HOURS SCHEDULED
Status: DISCONTINUED | OUTPATIENT
Start: 2022-01-01 | End: 2022-01-01

## 2022-01-01 RX ORDER — ONDANSETRON 2 MG/ML
4 INJECTION INTRAMUSCULAR; INTRAVENOUS EVERY 6 HOURS PRN
Status: DISCONTINUED | OUTPATIENT
Start: 2022-01-01 | End: 2022-01-01

## 2022-01-01 RX ORDER — ALBUMIN, HUMAN INJ 5% 5 %
12.5 SOLUTION INTRAVENOUS ONCE
Status: COMPLETED | OUTPATIENT
Start: 2022-01-01 | End: 2022-01-01

## 2022-01-01 RX ORDER — LEVALBUTEROL 1.25 MG/.5ML
1.25 SOLUTION, CONCENTRATE RESPIRATORY (INHALATION)
Status: DISCONTINUED | OUTPATIENT
Start: 2022-01-01 | End: 2022-01-01

## 2022-01-01 RX ORDER — METOPROLOL TARTRATE 5 MG/5ML
5 INJECTION INTRAVENOUS ONCE
Status: COMPLETED | OUTPATIENT
Start: 2022-01-01 | End: 2022-01-01

## 2022-01-01 RX ORDER — ASPIRIN 81 MG/1
81 TABLET ORAL WEEKLY
Qty: 30 TABLET | Refills: 0 | Status: SHIPPED | OUTPATIENT
Start: 2022-01-01 | End: 2022-01-01 | Stop reason: HOSPADM

## 2022-01-01 RX ORDER — FENTANYL CITRATE 50 UG/ML
25 INJECTION, SOLUTION INTRAMUSCULAR; INTRAVENOUS
Status: DISCONTINUED | OUTPATIENT
Start: 2022-01-01 | End: 2022-01-01

## 2022-01-01 RX ORDER — DILTIAZEM HYDROCHLORIDE 5 MG/ML
20 INJECTION INTRAVENOUS ONCE
Status: COMPLETED | OUTPATIENT
Start: 2022-01-01 | End: 2022-01-01

## 2022-01-01 RX ORDER — SODIUM CHLORIDE, SODIUM GLUCONATE, SODIUM ACETATE, POTASSIUM CHLORIDE, MAGNESIUM CHLORIDE, SODIUM PHOSPHATE, DIBASIC, AND POTASSIUM PHOSPHATE .53; .5; .37; .037; .03; .012; .00082 G/100ML; G/100ML; G/100ML; G/100ML; G/100ML; G/100ML; G/100ML
500 INJECTION, SOLUTION INTRAVENOUS ONCE
Status: COMPLETED | OUTPATIENT
Start: 2022-01-01 | End: 2022-01-01

## 2022-01-01 RX ORDER — DILTIAZEM HYDROCHLORIDE 5 MG/ML
15 INJECTION INTRAVENOUS ONCE
Status: COMPLETED | OUTPATIENT
Start: 2022-01-01 | End: 2022-01-01

## 2022-01-01 RX ORDER — CEFEPIME HYDROCHLORIDE 1 G/50ML
1000 INJECTION, SOLUTION INTRAVENOUS EVERY 12 HOURS
Status: DISCONTINUED | OUTPATIENT
Start: 2022-01-01 | End: 2022-01-01

## 2022-01-01 RX ORDER — HALOPERIDOL 5 MG/ML
0.5 INJECTION INTRAMUSCULAR EVERY 2 HOUR PRN
Status: DISCONTINUED | OUTPATIENT
Start: 2022-01-01 | End: 2022-01-01 | Stop reason: HOSPADM

## 2022-01-01 RX ORDER — METHYLPREDNISOLONE SODIUM SUCCINATE 125 MG/2ML
65 INJECTION, POWDER, LYOPHILIZED, FOR SOLUTION INTRAMUSCULAR; INTRAVENOUS DAILY
Status: DISCONTINUED | OUTPATIENT
Start: 2022-01-01 | End: 2022-01-01

## 2022-01-01 RX ORDER — AMOXICILLIN 250 MG
2 CAPSULE ORAL 2 TIMES DAILY
Status: DISCONTINUED | OUTPATIENT
Start: 2022-01-01 | End: 2022-01-01

## 2022-01-01 RX ORDER — BUDESONIDE 0.5 MG/2ML
0.5 INHALANT ORAL
Status: DISCONTINUED | OUTPATIENT
Start: 2022-01-01 | End: 2022-01-01

## 2022-01-01 RX ORDER — POLYETHYLENE GLYCOL 3350 17 G/17G
17 POWDER, FOR SOLUTION ORAL DAILY
Status: DISCONTINUED | OUTPATIENT
Start: 2022-01-01 | End: 2022-01-01

## 2022-01-01 RX ORDER — PREDNISONE 20 MG/1
20 TABLET ORAL DAILY
Qty: 90 TABLET | Refills: 3 | Status: SHIPPED | OUTPATIENT
Start: 2022-01-01 | End: 2022-01-01 | Stop reason: HOSPADM

## 2022-01-01 RX ORDER — DIGOXIN 0.25 MG/ML
250 INJECTION INTRAMUSCULAR; INTRAVENOUS EVERY 6 HOURS
Status: COMPLETED | OUTPATIENT
Start: 2022-01-01 | End: 2022-01-01

## 2022-01-01 RX ORDER — DILTIAZEM HYDROCHLORIDE 5 MG/ML
INJECTION INTRAVENOUS
Status: COMPLETED
Start: 2022-01-01 | End: 2022-01-01

## 2022-01-01 RX ORDER — METHYLPREDNISOLONE SODIUM SUCCINATE 40 MG/ML
40 INJECTION, POWDER, LYOPHILIZED, FOR SOLUTION INTRAMUSCULAR; INTRAVENOUS EVERY 12 HOURS SCHEDULED
Status: DISCONTINUED | OUTPATIENT
Start: 2022-01-01 | End: 2022-01-01

## 2022-01-01 RX ORDER — OMEGA-3S/DHA/EPA/FISH OIL/D3 300MG-1000
400 CAPSULE ORAL DAILY
Status: DISCONTINUED | OUTPATIENT
Start: 2022-01-01 | End: 2022-01-01

## 2022-01-01 RX ORDER — LIDOCAINE HYDROCHLORIDE 10 MG/ML
INJECTION, SOLUTION EPIDURAL; INFILTRATION; INTRACAUDAL; PERINEURAL
Status: DISPENSED
Start: 2022-01-01 | End: 2022-01-01

## 2022-01-01 RX ORDER — BISACODYL 10 MG
10 SUPPOSITORY, RECTAL RECTAL DAILY PRN
Status: DISCONTINUED | OUTPATIENT
Start: 2022-01-01 | End: 2022-01-01

## 2022-01-01 RX ORDER — SODIUM CHLORIDE FOR INHALATION 0.9 %
3 VIAL, NEBULIZER (ML) INHALATION ONCE
Status: DISCONTINUED | OUTPATIENT
Start: 2022-01-01 | End: 2022-01-01

## 2022-01-01 RX ORDER — GLYCOPYRROLATE 0.2 MG/ML
0.1 INJECTION INTRAMUSCULAR; INTRAVENOUS
Status: DISCONTINUED | OUTPATIENT
Start: 2022-01-01 | End: 2022-01-01 | Stop reason: HOSPADM

## 2022-01-01 RX ORDER — PREDNISONE 20 MG/1
40 TABLET ORAL DAILY
Status: DISCONTINUED | OUTPATIENT
Start: 2022-01-01 | End: 2022-01-01

## 2022-01-01 RX ORDER — CEFTRIAXONE 1 G/50ML
1000 INJECTION, SOLUTION INTRAVENOUS EVERY 24 HOURS
Status: DISCONTINUED | OUTPATIENT
Start: 2022-01-01 | End: 2022-01-01

## 2022-01-01 RX ORDER — FENTANYL CITRATE 50 UG/ML
25 INJECTION, SOLUTION INTRAMUSCULAR; INTRAVENOUS EVERY 2 HOUR PRN
Status: DISCONTINUED | OUTPATIENT
Start: 2022-01-01 | End: 2022-01-01

## 2022-01-01 RX ORDER — GLYCOPYRROLATE 0.2 MG/ML
0.1 INJECTION INTRAMUSCULAR; INTRAVENOUS EVERY 4 HOURS PRN
Status: DISCONTINUED | OUTPATIENT
Start: 2022-01-01 | End: 2022-01-01

## 2022-01-01 RX ORDER — FUROSEMIDE 10 MG/ML
40 INJECTION INTRAMUSCULAR; INTRAVENOUS ONCE
Status: COMPLETED | OUTPATIENT
Start: 2022-01-01 | End: 2022-01-01

## 2022-01-01 RX ORDER — DILTIAZEM HYDROCHLORIDE 5 MG/ML
10 INJECTION INTRAVENOUS ONCE
Status: COMPLETED | OUTPATIENT
Start: 2022-01-01 | End: 2022-01-01

## 2022-01-01 RX ORDER — ALBUTEROL SULFATE 2.5 MG/3ML
2.5 SOLUTION RESPIRATORY (INHALATION) ONCE
Status: COMPLETED | OUTPATIENT
Start: 2022-01-01 | End: 2022-01-01

## 2022-01-01 RX ORDER — PANTOPRAZOLE SODIUM 40 MG/1
40 TABLET, DELAYED RELEASE ORAL
Status: DISCONTINUED | OUTPATIENT
Start: 2022-01-01 | End: 2022-01-01

## 2022-01-01 RX ORDER — SODIUM CHLORIDE, SODIUM GLUCONATE, SODIUM ACETATE, POTASSIUM CHLORIDE, MAGNESIUM CHLORIDE, SODIUM PHOSPHATE, DIBASIC, AND POTASSIUM PHOSPHATE .53; .5; .37; .037; .03; .012; .00082 G/100ML; G/100ML; G/100ML; G/100ML; G/100ML; G/100ML; G/100ML
75 INJECTION, SOLUTION INTRAVENOUS CONTINUOUS
Status: DISCONTINUED | OUTPATIENT
Start: 2022-01-01 | End: 2022-01-01

## 2022-01-01 RX ORDER — CEFTRIAXONE 1 G/50ML
1000 INJECTION, SOLUTION INTRAVENOUS ONCE
Status: COMPLETED | OUTPATIENT
Start: 2022-01-01 | End: 2022-01-01

## 2022-01-01 RX ORDER — IRON POLYSACCHARIDE COMPLEX 150 MG
150 CAPSULE ORAL DAILY
Status: DISCONTINUED | OUTPATIENT
Start: 2022-01-01 | End: 2022-01-01

## 2022-01-01 RX ORDER — METHYLPREDNISOLONE SODIUM SUCCINATE 125 MG/2ML
60 INJECTION, POWDER, LYOPHILIZED, FOR SOLUTION INTRAMUSCULAR; INTRAVENOUS ONCE
Status: COMPLETED | OUTPATIENT
Start: 2022-01-01 | End: 2022-01-01

## 2022-01-01 RX ORDER — ASPIRIN 81 MG/1
81 TABLET ORAL DAILY
Qty: 30 TABLET | Refills: 0 | Status: SHIPPED | OUTPATIENT
Start: 2022-01-01 | End: 2022-01-01 | Stop reason: SDUPTHER

## 2022-01-01 RX ORDER — TRIAMCINOLONE ACETONIDE 40 MG/ML
40 INJECTION, SUSPENSION INTRA-ARTICULAR; INTRAMUSCULAR ONCE
Status: COMPLETED | OUTPATIENT
Start: 2022-01-01 | End: 2022-01-01

## 2022-01-01 RX ORDER — POTASSIUM CHLORIDE 14.9 MG/ML
20 INJECTION INTRAVENOUS ONCE
Status: COMPLETED | OUTPATIENT
Start: 2022-01-01 | End: 2022-01-01

## 2022-01-01 RX ORDER — HEPARIN SODIUM 1000 [USP'U]/ML
3300 INJECTION, SOLUTION INTRAVENOUS; SUBCUTANEOUS
Status: DISCONTINUED | OUTPATIENT
Start: 2022-01-01 | End: 2022-01-01

## 2022-01-01 RX ORDER — FENTANYL CITRATE-0.9 % NACL/PF 10 MCG/ML
50 PLASTIC BAG, INJECTION (ML) INTRAVENOUS CONTINUOUS
Status: DISCONTINUED | OUTPATIENT
Start: 2022-01-01 | End: 2022-01-01 | Stop reason: HOSPADM

## 2022-01-01 RX ORDER — HEPARIN SODIUM 1000 [USP'U]/ML
1650 INJECTION, SOLUTION INTRAVENOUS; SUBCUTANEOUS
Status: DISCONTINUED | OUTPATIENT
Start: 2022-01-01 | End: 2022-01-01

## 2022-01-01 RX ORDER — METOPROLOL TARTRATE 5 MG/5ML
5 INJECTION INTRAVENOUS EVERY 6 HOURS PRN
Status: DISCONTINUED | OUTPATIENT
Start: 2022-01-01 | End: 2022-01-01

## 2022-01-01 RX ORDER — ONDANSETRON 2 MG/ML
4 INJECTION INTRAMUSCULAR; INTRAVENOUS EVERY 4 HOURS PRN
Status: DISCONTINUED | OUTPATIENT
Start: 2022-01-01 | End: 2022-01-01 | Stop reason: HOSPADM

## 2022-01-01 RX ORDER — HALOPERIDOL 5 MG/ML
2 INJECTION INTRAMUSCULAR ONCE
Status: COMPLETED | OUTPATIENT
Start: 2022-01-01 | End: 2022-01-01

## 2022-01-01 RX ADMIN — LEVALBUTEROL HYDROCHLORIDE 1.25 MG: 1.25 SOLUTION, CONCENTRATE RESPIRATORY (INHALATION) at 14:16

## 2022-01-01 RX ADMIN — BISACODYL 10 MG: 10 SUPPOSITORY RECTAL at 05:41

## 2022-01-01 RX ADMIN — GLYCOPYRROLATE 0.1 MG: 0.4 INJECTION INTRAMUSCULAR; INTRAVENOUS at 00:34

## 2022-01-01 RX ADMIN — Medication 9 MG/HR: at 12:45

## 2022-01-01 RX ADMIN — HEPARIN SODIUM 12 UNITS/KG/HR: 10000 INJECTION, SOLUTION INTRAVENOUS at 13:04

## 2022-01-01 RX ADMIN — ACETYLCYSTEINE 8880 MG: 200 INJECTION, SOLUTION INTRAVENOUS at 13:23

## 2022-01-01 RX ADMIN — CEFEPIME HYDROCHLORIDE 1000 MG: 1 INJECTION, SOLUTION INTRAVENOUS at 13:00

## 2022-01-01 RX ADMIN — ALBUMIN (HUMAN) 12.5 G: 12.5 INJECTION, SOLUTION INTRAVENOUS at 11:54

## 2022-01-01 RX ADMIN — METHYLPREDNISOLONE SODIUM SUCCINATE 40 MG: 40 INJECTION, POWDER, FOR SOLUTION INTRAMUSCULAR; INTRAVENOUS at 09:53

## 2022-01-01 RX ADMIN — ACETYLCYSTEINE 2960 MG: 200 INJECTION, SOLUTION INTRAVENOUS at 14:31

## 2022-01-01 RX ADMIN — CEFEPIME HYDROCHLORIDE 1000 MG: 1 INJECTION, SOLUTION INTRAVENOUS at 01:17

## 2022-01-01 RX ADMIN — PANTOPRAZOLE SODIUM 40 MG: 40 TABLET, DELAYED RELEASE ORAL at 06:24

## 2022-01-01 RX ADMIN — LEVALBUTEROL HYDROCHLORIDE 1.25 MG: 1.25 SOLUTION, CONCENTRATE RESPIRATORY (INHALATION) at 13:02

## 2022-01-01 RX ADMIN — SODIUM CHLORIDE, SODIUM GLUCONATE, SODIUM ACETATE, POTASSIUM CHLORIDE, MAGNESIUM CHLORIDE, SODIUM PHOSPHATE, DIBASIC, AND POTASSIUM PHOSPHATE 500 ML: .53; .5; .37; .037; .03; .012; .00082 INJECTION, SOLUTION INTRAVENOUS at 01:23

## 2022-01-01 RX ADMIN — DILTIAZEM HYDROCHLORIDE 15 MG/HR: 5 INJECTION INTRAVENOUS at 10:13

## 2022-01-01 RX ADMIN — PANTOPRAZOLE SODIUM 40 MG: 40 TABLET, DELAYED RELEASE ORAL at 05:26

## 2022-01-01 RX ADMIN — VANCOMYCIN HYDROCHLORIDE 750 MG: 750 INJECTION, SOLUTION INTRAVENOUS at 23:04

## 2022-01-01 RX ADMIN — DILTIAZEM HYDROCHLORIDE 5 MG/HR: 5 INJECTION INTRAVENOUS at 09:37

## 2022-01-01 RX ADMIN — ACETAMINOPHEN 650 MG: 325 TABLET ORAL at 21:02

## 2022-01-01 RX ADMIN — DILTIAZEM HYDROCHLORIDE 20 MG: 5 INJECTION INTRAVENOUS at 08:52

## 2022-01-01 RX ADMIN — METHYLPREDNISOLONE SODIUM SUCCINATE 60 MG: 125 INJECTION, POWDER, FOR SOLUTION INTRAMUSCULAR; INTRAVENOUS at 08:02

## 2022-01-01 RX ADMIN — BUDESONIDE 0.5 MG: 0.5 INHALANT ORAL at 20:08

## 2022-01-01 RX ADMIN — HALOPERIDOL LACTATE 2 MG: 5 INJECTION, SOLUTION INTRAMUSCULAR at 17:57

## 2022-01-01 RX ADMIN — METHYLPREDNISOLONE SODIUM SUCCINATE 40 MG: 40 INJECTION, POWDER, FOR SOLUTION INTRAMUSCULAR; INTRAVENOUS at 20:53

## 2022-01-01 RX ADMIN — BUDESONIDE 0.5 MG: 0.5 INHALANT ORAL at 20:43

## 2022-01-01 RX ADMIN — HALOPERIDOL LACTATE 0.5 MG: 5 INJECTION, SOLUTION INTRAMUSCULAR at 05:34

## 2022-01-01 RX ADMIN — TRIAMCINOLONE ACETONIDE 40 MG: 40 INJECTION, SUSPENSION INTRA-ARTICULAR; INTRAMUSCULAR at 09:36

## 2022-01-01 RX ADMIN — SODIUM CHLORIDE, SODIUM GLUCONATE, SODIUM ACETATE, POTASSIUM CHLORIDE, MAGNESIUM CHLORIDE, SODIUM PHOSPHATE, DIBASIC, AND POTASSIUM PHOSPHATE 500 ML: .53; .5; .37; .037; .03; .012; .00082 INJECTION, SOLUTION INTRAVENOUS at 03:15

## 2022-01-01 RX ADMIN — ALBUMIN (HUMAN) 12.5 G: 12.5 INJECTION, SOLUTION INTRAVENOUS at 21:22

## 2022-01-01 RX ADMIN — DIGOXIN 250 MCG: 250 INJECTION, SOLUTION INTRAMUSCULAR; INTRAVENOUS; PARENTERAL at 17:20

## 2022-01-01 RX ADMIN — INSULIN LISPRO 5 UNITS: 100 INJECTION, SOLUTION INTRAVENOUS; SUBCUTANEOUS at 13:28

## 2022-01-01 RX ADMIN — FENTANYL CITRATE 25 MCG: 50 INJECTION INTRAMUSCULAR; INTRAVENOUS at 23:25

## 2022-01-01 RX ADMIN — ONDANSETRON 4 MG: 2 INJECTION INTRAMUSCULAR; INTRAVENOUS at 02:45

## 2022-01-01 RX ADMIN — FENTANYL CITRATE 50 MCG: 50 INJECTION INTRAMUSCULAR; INTRAVENOUS at 08:30

## 2022-01-01 RX ADMIN — IPRATROPIUM BROMIDE 0.5 MG: 0.5 SOLUTION RESPIRATORY (INHALATION) at 20:09

## 2022-01-01 RX ADMIN — MONTELUKAST 10 MG: 10 TABLET, FILM COATED ORAL at 21:02

## 2022-01-01 RX ADMIN — SODIUM CHLORIDE 500 ML: 0.9 INJECTION, SOLUTION INTRAVENOUS at 09:41

## 2022-01-01 RX ADMIN — FENTANYL CITRATE 25 MCG/HR: 0.05 INJECTION, SOLUTION INTRAMUSCULAR; INTRAVENOUS at 00:15

## 2022-01-01 RX ADMIN — METHYLPREDNISOLONE SODIUM SUCCINATE 40 MG: 40 INJECTION, POWDER, FOR SOLUTION INTRAMUSCULAR; INTRAVENOUS at 19:22

## 2022-01-01 RX ADMIN — HALOPERIDOL LACTATE 0.5 MG: 5 INJECTION, SOLUTION INTRAMUSCULAR at 23:38

## 2022-01-01 RX ADMIN — HEPARIN SODIUM 16 UNITS/KG/HR: 10000 INJECTION, SOLUTION INTRAVENOUS at 18:46

## 2022-01-01 RX ADMIN — CEFTRIAXONE 1000 MG: 1 INJECTION, SOLUTION INTRAVENOUS at 08:36

## 2022-01-01 RX ADMIN — METRONIDAZOLE 500 MG: 500 TABLET ORAL at 06:24

## 2022-01-01 RX ADMIN — FENTANYL CITRATE 50 MCG: 50 INJECTION INTRAMUSCULAR; INTRAVENOUS at 03:15

## 2022-01-01 RX ADMIN — GLYCOPYRROLATE 0.1 MG: 0.4 INJECTION INTRAMUSCULAR; INTRAVENOUS at 23:24

## 2022-01-01 RX ADMIN — GLYCOPYRROLATE 0.1 MG: 0.4 INJECTION INTRAMUSCULAR; INTRAVENOUS at 02:47

## 2022-01-01 RX ADMIN — ONDANSETRON 4 MG: 2 INJECTION INTRAMUSCULAR; INTRAVENOUS at 02:37

## 2022-01-01 RX ADMIN — DILTIAZEM HYDROCHLORIDE 30 MG: 30 TABLET, FILM COATED ORAL at 18:44

## 2022-01-01 RX ADMIN — DILTIAZEM HYDROCHLORIDE 15 MG: 5 INJECTION INTRAVENOUS at 20:43

## 2022-01-01 RX ADMIN — ACETYLCYSTEINE 9675 MG: 200 INJECTION, SOLUTION INTRAVENOUS at 13:00

## 2022-01-01 RX ADMIN — FUROSEMIDE 40 MG: 10 INJECTION, SOLUTION INTRAMUSCULAR; INTRAVENOUS at 20:48

## 2022-01-01 RX ADMIN — ACETYLCYSTEINE 3225 MG: 200 INJECTION, SOLUTION INTRAVENOUS at 15:30

## 2022-01-01 RX ADMIN — POTASSIUM CHLORIDE 20 MEQ: 14.9 INJECTION, SOLUTION INTRAVENOUS at 23:30

## 2022-01-01 RX ADMIN — METHYLPREDNISOLONE SODIUM SUCCINATE 40 MG: 40 INJECTION, POWDER, FOR SOLUTION INTRAMUSCULAR; INTRAVENOUS at 04:39

## 2022-01-01 RX ADMIN — SODIUM CHLORIDE 9 UNITS/HR: 9 INJECTION, SOLUTION INTRAVENOUS at 18:42

## 2022-01-01 RX ADMIN — FUROSEMIDE 40 MG: 10 INJECTION, SOLUTION INTRAMUSCULAR; INTRAVENOUS at 21:23

## 2022-01-01 RX ADMIN — DILTIAZEM HYDROCHLORIDE 5 MG/HR: 5 INJECTION INTRAVENOUS at 21:00

## 2022-01-01 RX ADMIN — FENTANYL CITRATE 50 MCG: 50 INJECTION INTRAMUSCULAR; INTRAVENOUS at 05:35

## 2022-01-01 RX ADMIN — VANCOMYCIN HYDROCHLORIDE 1250 MG: 1 INJECTION, POWDER, LYOPHILIZED, FOR SOLUTION INTRAVENOUS at 02:24

## 2022-01-01 RX ADMIN — METOPROLOL TARTRATE 5 MG: 5 INJECTION INTRAVENOUS at 17:07

## 2022-01-01 RX ADMIN — FENTANYL CITRATE 50 MCG: 50 INJECTION INTRAMUSCULAR; INTRAVENOUS at 06:37

## 2022-01-01 RX ADMIN — METHYLPREDNISOLONE SODIUM SUCCINATE 65 MG: 125 INJECTION, POWDER, FOR SOLUTION INTRAMUSCULAR; INTRAVENOUS at 12:41

## 2022-01-01 RX ADMIN — FENTANYL CITRATE 25 MCG: 50 INJECTION INTRAMUSCULAR; INTRAVENOUS at 00:49

## 2022-01-01 RX ADMIN — SODIUM CHLORIDE 0.5 UNITS/HR: 9 INJECTION, SOLUTION INTRAVENOUS at 22:06

## 2022-01-01 RX ADMIN — DILTIAZEM HYDROCHLORIDE 10 MG: 5 INJECTION INTRAVENOUS at 08:38

## 2022-01-01 RX ADMIN — ALBUTEROL SULFATE 2.5 MG: 2.5 SOLUTION RESPIRATORY (INHALATION) at 08:06

## 2022-01-01 RX ADMIN — METRONIDAZOLE 500 MG: 500 TABLET ORAL at 21:51

## 2022-01-01 RX ADMIN — GLYCOPYRROLATE 0.1 MG: 0.4 INJECTION INTRAMUSCULAR; INTRAVENOUS at 05:36

## 2022-01-01 RX ADMIN — METOPROLOL TARTRATE 5 MG: 5 INJECTION INTRAVENOUS at 00:09

## 2022-01-01 RX ADMIN — IPRATROPIUM BROMIDE 0.5 MG: 0.5 SOLUTION RESPIRATORY (INHALATION) at 13:02

## 2022-01-01 RX ADMIN — DILTIAZEM HYDROCHLORIDE 15 MG/HR: 5 INJECTION INTRAVENOUS at 17:37

## 2022-01-01 RX ADMIN — DIGOXIN 250 MCG: 250 INJECTION, SOLUTION INTRAMUSCULAR; INTRAVENOUS; PARENTERAL at 11:55

## 2022-01-01 RX ADMIN — FENTANYL CITRATE 25 MCG: 50 INJECTION INTRAMUSCULAR; INTRAVENOUS at 01:27

## 2022-01-01 RX ADMIN — IPRATROPIUM BROMIDE 0.5 MG: 0.5 SOLUTION RESPIRATORY (INHALATION) at 14:16

## 2022-01-01 RX ADMIN — IOHEXOL 50 ML: 240 INJECTION, SOLUTION INTRATHECAL; INTRAVASCULAR; INTRAVENOUS; ORAL at 02:26

## 2022-01-01 RX ADMIN — AZITHROMYCIN MONOHYDRATE 500 MG: 500 INJECTION, POWDER, LYOPHILIZED, FOR SOLUTION INTRAVENOUS at 08:58

## 2022-01-01 RX ADMIN — HEPARIN SODIUM 3300 UNITS: 1000 INJECTION, SOLUTION INTRAVENOUS; SUBCUTANEOUS at 19:26

## 2022-01-01 RX ADMIN — Medication 10 MG/HR: at 04:59

## 2022-01-01 RX ADMIN — FUROSEMIDE 40 MG: 10 INJECTION, SOLUTION INTRAMUSCULAR; INTRAVENOUS at 17:56

## 2022-01-01 RX ADMIN — SODIUM CHLORIDE, SODIUM GLUCONATE, SODIUM ACETATE, POTASSIUM CHLORIDE, MAGNESIUM CHLORIDE, SODIUM PHOSPHATE, DIBASIC, AND POTASSIUM PHOSPHATE 75 ML/HR: .53; .5; .37; .037; .03; .012; .00082 INJECTION, SOLUTION INTRAVENOUS at 04:30

## 2022-01-01 RX ADMIN — METRONIDAZOLE 500 MG: 500 TABLET ORAL at 01:27

## 2022-01-01 RX ADMIN — LEVALBUTEROL HYDROCHLORIDE 1.25 MG: 1.25 SOLUTION, CONCENTRATE RESPIRATORY (INHALATION) at 20:08

## 2022-01-01 RX ADMIN — IPRATROPIUM BROMIDE 0.5 MG: 0.5 SOLUTION RESPIRATORY (INHALATION) at 20:43

## 2022-01-01 RX ADMIN — CEFEPIME HYDROCHLORIDE 1000 MG: 1 INJECTION, SOLUTION INTRAVENOUS at 00:26

## 2022-01-01 RX ADMIN — LEVALBUTEROL HYDROCHLORIDE 1.25 MG: 1.25 SOLUTION, CONCENTRATE RESPIRATORY (INHALATION) at 20:43

## 2022-01-01 RX ADMIN — MONTELUKAST 10 MG: 10 TABLET, FILM COATED ORAL at 21:51

## 2022-01-01 RX ADMIN — DEXTROSE 150 MG: 50 INJECTION, SOLUTION INTRAVENOUS at 09:52

## 2022-01-01 RX ADMIN — IOHEXOL 90 ML: 350 INJECTION, SOLUTION INTRAVENOUS at 03:14

## 2022-01-04 NOTE — PROGRESS NOTES
Outpatient Care Management Note:  Call attempted to [address] [place] [address]  Unable to reach the pharmacy  Call placed to [address]  She is not sure which medication it is  She has enough to last her approximately one week  She asks that I call the pharmacy  I made her aware that I have been unable to reach the pharmacy  She has an appointment with her PCP next week and is taking all her medication with her to the appointment  Advised her that was a good plan  Advised her I would continue to attempt to reach the Pharmacy  Also discussed that I would ask Tamia to submit an application for PACE/[employer]  She is agreeable to same  No other needs at this time  Her breathing is at her baseline  She was seen by GI today and does not need to go back for a year  Spoke with CHW and asked that she complete PACE/PACENET application for patient  Will continue to attempt to reach pharmacy to determine which medication is needed

## 2022-01-04 NOTE — PROGRESS NOTES
Outpatient Care Management Note:  Received voicemail from [facility] regarding her medication cost  Per the message one of her pills has increased to $300  She did not mention which medication but asks that I call Armando Crespo to "find out what is going on "  Call attempted to [facility], unable to speak with anyone in the pharmacy at this time  Outpatient Care Management Note:  Received voicemail from Pete stating that the cost of one of her "pills" went up to $300 and she is unable to afford  She asks that I call [facility] to determine which medication as well as reason as she will not be available most of the day on 1/4/2022 due to a physician appointment  Call attempted to [address] [place] [address] as requested  Call not picked up by pharmacy

## 2022-01-04 NOTE — PROGRESS NOTES
CHW completed online [employer] phani for pt and uploaded photo id, medical ins cards, page 1 of 2020 tax return, and no longer working letter

## 2022-01-04 NOTE — PROGRESS NOTES
TANI CM received and reviewed in basket message from Marjan Gaytan  CHW continues to assist patient with applying tapan care  Per CHW note on 12/27/21, patient does not [facility]  SW CM will continue to follow and provide assistance as needed

## 2022-01-06 NOTE — PROGRESS NOTES
Outpatient Care Management Note:  Call placed to [facility] to determine which medication cost increased  Per the Pharmacist, it is her Januvia and the cost is due to her deductible  Pharmacist also stated that Daily Cortés was turned down for [employer] in October of this year  Advised her another application had been completed this week  She provided me the phone number to check on the application of the status  Called PAC[employer]ET  Recent application was not processed  On 12/16/2021, due to new income guidelines, Grace was enrolled in [employer]  Adeline provided the ID # [id number]  Call placed to [facility]  Provided pharmacist with ID number  Prescription was rerun and cost is now $15      Call placed to Daily Cortés to provide update  Message left with information  Requested return call to confirm that she received the message

## 2022-01-10 PROBLEM — Z48.02 VISIT FOR SUTURE REMOVAL: Status: RESOLVED | Noted: 2021-01-01 | Resolved: 2022-01-01

## 2022-01-10 PROBLEM — Z98.890 HISTORY OF ESOPHAGOGASTRODUODENOSCOPY (EGD): Status: RESOLVED | Noted: 2021-01-01 | Resolved: 2022-01-01

## 2022-01-10 PROBLEM — Z23 ENCOUNTER FOR IMMUNIZATION: Status: RESOLVED | Noted: 2021-01-01 | Resolved: 2022-01-01

## 2022-01-10 PROBLEM — E87.8 POTASSIUM DISORDER: Status: RESOLVED | Noted: 2021-01-01 | Resolved: 2022-01-01

## 2022-01-10 PROBLEM — Z09 FOLLOW-UP EXAM, LESS THAN 3 MONTHS SINCE PREVIOUS EXAM: Status: RESOLVED | Noted: 2021-01-01 | Resolved: 2022-01-01

## 2022-01-10 NOTE — PROGRESS NOTES
Assessment/Plan:     Problem List Items Addressed This Visit        Digestive    Gastroesophageal reflux disease without esophagitis     · Continue on protonix             Endocrine    Type 2 diabetes mellitus without complication, without long-term current use of insulin (HCC)       Lab Results   Component Value Date    HGBA1C 6 7 (H) 11/01/2021 ·     · in office HgBA1c: 6 8   · Continue metformin, januvia, neurontin   · Watching her diet             Respiratory    Other emphysema (HCC)    Relevant Medications    ipratropium (Atrovent HFA) 17 mcg/act inhaler    triamcinolone acetonide (KENALOG-40) 40 mg/mL injection 40 mg (Completed)    theophylline (CORTNEY-24) 200 MG 24 hr capsule    predniSONE 20 mg tablet    Deviated nasal septum    Chronic respiratory failure with hypoxia (HCC) - Primary     · Continue trelegy inhaler, atrovent inhaler, duo-neb nebulizer, magnesium tablets, singulair tablets, oxygen therapy, theophylline capsules  · Will give kenalog injection in the office            Relevant Medications    triamcinolone acetonide (KENALOG-40) 40 mg/mL injection 40 mg (Completed)    predniSONE 20 mg tablet       Cardiovascular and Mediastinum    Paroxysmal atrial fibrillation (HCC)     · Continue on eliquis, cardizem, lasix, toprol         Chronic diastolic CHF (congestive heart failure) (HCC)     Wt Readings from Last 3 Encounters:   11/01/21 59 5 kg (131 lb 3 2 oz)   10/04/21 57 4 kg (126 lb 9 6 oz)   09/02/21 55 7 kg (122 lb 12 8 oz) ·   Continue on eliquis, cardizem, lasix, toprol             Essential hypertension     · Continue on eliquis, cardizem, lasix, toprol            Nervous and Auditory    Lumbar radiculopathy       Musculoskeletal and Integument    Osteoporosis     · Continue of fosamax, calcium + Vitamin D         Compression fracture of L4 vertebra (HCC)    Closed wedge compression fracture of T12 vertebra (HCC)    Sacroiliitis (HCC)       Genitourinary    Chronic cystitis       Other Chronic anxiety (Chronic)     · Continue atarax         Chronic bilateral low back pain without sciatica    Gout    Macular degeneration of both eyes    Debility    Chronic pain syndrome     · Continue percocet          Shortness of breath    Gastrostomy complication, unspecified (HCC)    Uncomplicated opioid dependence ([facility] )    Ambulatory dysfunction     · Uses a wheel chair when out  · Uses nothing at home  Iron deficiency anemia     · Continue iron supplementation and daily oranges  Easy bruising     · Continue on eliquis 2 5 mg daily          Hair loss      Other Visit Diagnoses     Atrial fibrillation with rapid ventricular response (HCC)        Relevant Medications    apixaban (ELIQUIS) 2 5 mg    aspirin (ECOTRIN LOW STRENGTH) 81 mg EC tablet    Simple chronic bronchitis (HCC)        Relevant Medications    ipratropium (Atrovent HFA) 17 mcg/act inhaler    theophylline (CORTNEY-24) 200 MG 24 hr capsule          Subjective:      Patient ID: Ga Gibson is a 82 y o  female  The patient is here with 3 weeks of worsening breathing  She is taking extra prednisone to ease this  After going over her medications, she has not been taking her theophylline - we will restart this  We will also give her a kenalog injection while in the hospital        The following portions of the patient's history were reviewed and updated as appropriate:       Past Medical History:  She has a past medical history of Asthma, Chronic diastolic CHF (congestive heart failure) ([facility] [address] ), DJD (degenerative joint disease), Paroxysmal [facility] ), and Shortness of breath ,  _______________________________________________________________________  Medical Problems:  does not have any pertinent problems on file ,  _______________________________________________________________________  Past Surgical History:   has a past surgical history that includes Colon surgery;  Colonoscopy w/ polypectomy (N/A, 1/21/2019); Bladder surgery; Small intestine surgery (03/2020); Gastrojejunostomy w/ jejunostomy tube (N/A, 2/3/2020); Colonoscopy; pr injection,sacroiliac joint (Right, 9/2/2020); FL guided needle plac bx/asp/inj (9/2/2020); pr injection,sacroiliac joint (Right, 1/21/2021); FL guided needle plac bx/asp/inj (1/21/2021); and EGD ,  _______________________________________________________________________  Family History:  family history includes Heart disease in her mother ,  _______________________________________________________________________  Social History:   reports that she quit smoking about 8 years ago  She has never used smokeless tobacco  She reports that she does not drink alcohol and does not use drugs  ,  _______________________________________________________________________  Allergies:  is allergic to bee venom and fluticasone     _______________________________________________________________________  Current Outpatient Medications   Medication Sig Dispense Refill    acetaminophen (TYLENOL) 325 mg tablet Take 650 mg by mouth every 4 (four) hours as needed for mild pain      alendronate (FOSAMAX) 70 mg tablet take 1 tablet by mouth every 7 days 4 tablet 5    apixaban (ELIQUIS) 2 5 mg Take 1 tablet (2 5 mg total) by mouth in the morning On Monday, Tuesday, Thursday, Friday 60 tablet 5    Artificial Tear Solution (GENTEAL TEARS) 0 1-0 2-0 3 % SOLN Administer 1 drop to both eyes 3 (three) times a day      budesonide-formoterol (Symbicort) 160-4 5 mcg/act inhaler Inhale 2 puffs 2 (two) times a day      Calcium Carb-Cholecalciferol (CALCIUM 1000 + D PO) Take 1,000 mg by mouth daily      cholecalciferol (VITAMIN D3) 400 units tablet Take 400 Units by mouth daily      diltiazem (CARDIZEM CD) 120 mg 24 hr capsule take 1 capsule by mouth once daily 30 capsule 5    fluticasone (FLONASE) 50 mcg/act nasal spray 1 spray into each nostril daily 18 2 mL 3    fluticasone-umeclidinium-vilanterol (Trelegy Ellipta) 100-62 5-25 MCG/INH inhaler Inhale 1 puff daily Rinse mouth after use  180 each 4    furosemide (LASIX) 20 mg tablet take 2 tablets by mouth twice a day (Patient taking differently: Pt takes 1 pill not 2) 120 tablet 5    hydrOXYzine HCL (ATARAX) 25 mg tablet TAKE 1 TABLET BY MOUTH EVERY 6 HOURS AS NEEDED FOR ANXIETY 120 tablet 0    ipratropium (Atrovent HFA) 17 mcg/act inhaler Inhale 2 puffs every 6 (six) hours 38 7 g 5    ipratropium-albuterol (DUO-NEB) 0 5-2 5 mg/3 mL nebulizer solution 3mg q 6 hours by nebulizer while awake   360 mL 7    iron polysaccharides (Ferrex 150) 150 mg capsule Take 1 capsule (150 mg total) by mouth daily 60 capsule 5    Januvia 50 MG tablet take 1 tablet by mouth once daily 30 tablet 5    magnesium oxide (MAG-OX) 400 mg take 1 tablet by mouth twice a day 60 tablet 5    metFORMIN (GLUCOPHAGE) 500 mg tablet take 1 tablet by mouth twice a day WITH A MEAL 60 tablet 3    metoprolol succinate (TOPROL-XL) 25 mg 24 hr tablet Take 3 tablets (75 mg total) by mouth every morning 90 tablet 5    montelukast (SINGULAIR) 10 mg tablet Take 1 tablet (10 mg total) by mouth daily at bedtime 90 tablet 1    omeprazole (PriLOSEC) 40 MG capsule Take 40 mg by mouth every morning       oxyCODONE-acetaminophen (PERCOCET) 5-325 mg per tablet Take 1 tablet by mouth every 6 (six) hours as needed for moderate painMax Daily Amount: 4 tablets 120 tablet 0    OXYGEN-HELIUM IN Inhale      predniSONE 5 mg tablet take 1 tablet by mouth twice a day with meals 60 tablet 5    simethicone (MYLICON) 80 mg chewable tablet Chew 1 tablet (80 mg total) every 6 (six) hours as needed for flatulence 30 tablet 0    theophylline (CORTNEY-24) 200 MG 24 hr capsule Take 1 capsule (200 mg total) by mouth daily 90 capsule 0    aspirin (ECOTRIN LOW STRENGTH) 81 mg EC tablet Take 1 tablet (81 mg total) by mouth once a week Take on Sunday 30 tablet 0    gabapentin (NEURONTIN) 300 mg capsule Take 1 capsule (300 mg total) by mouth 3 (three) times a day (Patient not taking: Reported on 1/10/2022 ) 90 capsule 0    lidocaine (LIDODERM) 5 % Apply 1 patch topically daily Remove & Discard patch within 12 hours or as directed by MD 30 patch 0    polyethylene glycol (MIRALAX) 17 g packet Take 17 g by mouth daily 510 g 0    predniSONE 20 mg tablet Take 1 tablet (20 mg total) by mouth daily 90 tablet 3    psyllium (METAMUCIL) packet Take 1 packet by mouth 3 (three) times a day (Patient not taking: Reported on 1/10/2022 ) 100 packet 1     No current facility-administered medications for this visit      _______________________________________________________________________    No orders to display       Review of Systems   Constitutional: Negative for activity change, chills, fatigue and fever  HENT: Negative for rhinorrhea and sore throat  Eyes: Negative for pain  Respiratory: Positive for shortness of breath  Negative for cough  Cardiovascular: Positive for leg swelling  Negative for chest pain and palpitations  Gastrointestinal: Negative for abdominal pain, constipation, diarrhea, nausea and vomiting  Genitourinary: Negative for difficulty urinating, flank pain, frequency and urgency  Musculoskeletal: Positive for gait problem  Negative for joint swelling and myalgias  Skin: Positive for wound (chronic issues with bruising)  Negative for color change  Neurological: Positive for weakness  Negative for dizziness, light-headedness and headaches  Psychiatric/Behavioral: Positive for sleep disturbance  The patient is nervous/anxious  All other systems reviewed and are negative  Objective:  Vitals:    01/10/22 0815   BP: 115/76   BP Location: Left arm   Patient Position: Sitting   Cuff Size: Standard   Pulse: 81   Resp: 16   Temp: (!) 96 5 °F (35 8 °C)   SpO2: 97%   Weight: 58 1 kg (128 lb)     Body mass index is 25 kg/m²  Physical Exam  Vitals reviewed  Constitutional:       General: She is awake        Appearance: Normal appearance  She is well-developed and normal weight  HENT:      Head: Normocephalic and atraumatic  Nose: Nose normal       Mouth/Throat:      Mouth: Mucous membranes are moist    Eyes:      Extraocular Movements: Extraocular movements intact  Comments: Positive for macular degeneration    Cardiovascular:      Rate and Rhythm: Normal rate and regular rhythm  Pulses: Normal pulses  Heart sounds: Normal heart sounds  Pulmonary:      Effort: Pulmonary effort is normal       Breath sounds: Examination of the right-upper field reveals rhonchi  Examination of the left-upper field reveals rhonchi  Examination of the right-middle field reveals rhonchi  Examination of the left-middle field reveals rhonchi  Rhonchi present  Abdominal:      General: Bowel sounds are normal       Palpations: Abdomen is soft  Musculoskeletal:         General: Normal range of motion  Cervical back: Normal range of motion  Right lower le+ Pitting Edema present  Left lower le+ Pitting Edema present  Comments: Generalized weakness and pain    Skin:     General: Skin is warm and dry  Comments: Scattered ecchymotic areas   Neurological:      Mental Status: She is alert and oriented to person, place, and time  Psychiatric:         Attention and Perception: Attention normal          Mood and Affect: Mood normal          Speech: Speech normal          Behavior: Behavior normal  Behavior is cooperative

## 2022-01-10 NOTE — ASSESSMENT & PLAN NOTE
Wt Readings from Last 3 Encounters:   11/01/21 59 5 kg (131 lb 3 2 oz)   10/04/21 57 4 kg (126 lb 9 6 oz)   09/02/21 55 7 kg (122 lb 12 8 oz)   · Continue on eliquis, cardizem, lasix, toprol

## 2022-01-10 NOTE — TELEPHONE ENCOUNTER
Pharmacy called   Can you resent the script for pt aspirin     Frequency States- daily  However  It also  States-  Take one tablet by mouth daily on Sunday  So just resend script to its clear     Thank you

## 2022-01-10 NOTE — ASSESSMENT & PLAN NOTE
· Continue trelegy inhaler, atrovent inhaler, duo-neb nebulizer, magnesium tablets, singulair tablets, oxygen therapy, theophylline capsules  · Will give kenalog injection in the office

## 2022-01-10 NOTE — PATIENT INSTRUCTIONS
Eliquis 2 5 mg on Monday, Tuesday, Thursday, Friday   Aspirin 81 mg Sunday    No Eliquis or Aspirin on Wednesday or Saturday    Theophylline 200 mg daily
[de-identified] : 39 year year old male patient came in for regular follow up on his pre exposure prophylaxis use for HIV prevention. Patient reports he is 100% compliant with truvada. Denies condom use. Sexually active with multiple partners. Admits to insertive and receptive anal intercourse.

## 2022-01-10 NOTE — ASSESSMENT & PLAN NOTE
Lab Results   Component Value Date    HGBA1C 6 7 (H) 11/01/2021   ·   · in office HgBA1c: 6 8   · Continue metformin, januvia, neurontin   · Watching her diet

## 2022-01-12 NOTE — PROGRESS NOTES
SW CM reviewed case with RN JESSICA Gloria and ANGELA Quinn  At this time Madison Patrick continues to follow patient  SW CM will close but will be available for any additional social needs  Madison Patrick aware regarding  Please re consult SW CM as needed

## 2022-01-12 NOTE — PROGRESS NOTES
Outpatient Care Management Note:  Call attempted to [facility] after recent PCP appointment  Message left for patient to please return call  Contact information left on message  Office notes reviewed

## 2022-01-12 NOTE — PROGRESS NOTES
Email received from [employer] indicating Financial Counselor Chemo Alvares has been out of the office and just returned  In the process of reviewing multiple apps and will f/u if an additional information is needed

## 2022-01-13 NOTE — TELEPHONE ENCOUNTER
I agree with taking another water pill  As for her breathing - I know she does not want to - but she may need to go to the ER if it does not get better  She could also use her nebulizers more if she wants to see if that helps too

## 2022-01-16 PROBLEM — A41.9 SEPSIS (HCC): Status: ACTIVE | Noted: 2022-01-01

## 2022-01-16 PROBLEM — J96.10 CHRONIC RESPIRATORY FAILURE (HCC): Status: ACTIVE | Noted: 2019-10-07

## 2022-01-16 PROBLEM — E87.2 HIGH ANION GAP METABOLIC ACIDOSIS: Status: ACTIVE | Noted: 2022-01-01

## 2022-01-16 PROBLEM — E87.2 LACTIC ACIDOSIS: Status: ACTIVE | Noted: 2022-01-01

## 2022-01-16 PROBLEM — J44.1 COPD WITH ACUTE EXACERBATION (HCC): Status: ACTIVE | Noted: 2022-01-01

## 2022-01-16 PROBLEM — J96.20 ACUTE ON CHRONIC RESPIRATORY FAILURE (HCC): Status: ACTIVE | Noted: 2019-10-07

## 2022-01-16 PROBLEM — J96.21 ACUTE ON CHRONIC RESPIRATORY FAILURE WITH HYPOXIA (HCC): Status: RESOLVED | Noted: 2019-10-07 | Resolved: 2022-01-01

## 2022-01-16 NOTE — NURSING NOTE
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

## 2022-01-16 NOTE — ED PROVIDER NOTES
History  Chief Complaint   Patient presents with    Shortness of Breath     53-year-old female with history of COPD, AFib, presenting with shortness of breath worsening over last several days  Denies any chest pain, fever chills  Patient is on prednisone as an outpatient  Shortness of Breath  Severity:  Moderate  Onset quality:  Gradual  Timing:  Constant  Progression:  Worsening  Chronicity:  New  Relieved by:  Nothing  Worsened by:  Nothing  Ineffective treatments:  None tried  Associated symptoms: cough    Associated symptoms: no abdominal pain, no chest pain, no fever, no rash, no sore throat, no vomiting and no wheezing        Prior to Admission Medications   Prescriptions Last Dose Informant Patient Reported? Taking?    Artificial Tear Solution (GENTEAL TEARS) 0 1-0 2-0 3 % SOLN 1/16/2022 at Unknown time Self Yes Yes   Sig: Administer 1 drop to both eyes 3 (three) times a day   Calcium Carb-Cholecalciferol (CALCIUM 1000 + D PO) 1/16/2022 at Unknown time Self Yes Yes   Sig: Take 1,000 mg by mouth daily   Januvia 50 MG tablet 1/16/2022 at Unknown time  No Yes   Sig: take 1 tablet by mouth once daily   OXYGEN-HELIUM IN 1/16/2022 at Unknown time Self Yes Yes   Sig: Inhale   acetaminophen (TYLENOL) 325 mg tablet Unknown at Unknown time Self Yes No   Sig: Take 650 mg by mouth every 4 (four) hours as needed for mild pain   alendronate (FOSAMAX) 70 mg tablet   No No   Sig: take 1 tablet by mouth every 7 days   apixaban (ELIQUIS) 2 5 mg Past Week at Unknown time  No Yes   Sig: Take 1 tablet (2 5 mg total) by mouth in the morning On Monday, Tuesday, Thursday, Friday   aspirin (ECOTRIN LOW STRENGTH) 81 mg EC tablet 1/16/2022 at Unknown time  No Yes   Sig: Take 1 tablet (81 mg total) by mouth once a week Take on Sunday   budesonide-formoterol (Symbicort) 160-4 5 mcg/act inhaler 1/16/2022 at Unknown time Self Yes Yes   Sig: Inhale 2 puffs 2 (two) times a day   cholecalciferol (VITAMIN D3) 400 units tablet 1/16/2022 at Unknown time Self Yes Yes   Sig: Take 400 Units by mouth daily   diltiazem (CARDIZEM CD) 120 mg 24 hr capsule 2022 at Unknown time  No Yes   Sig: take 1 capsule by mouth once daily   fluticasone (FLONASE) 50 mcg/act nasal spray 2022 at Unknown time  No Yes   Si spray into each nostril daily   fluticasone-umeclidinium-vilanterol (Trelegy Ellipta) 100-62 5-25 MCG/INH inhaler   No No   Sig: Inhale 1 puff daily Rinse mouth after use  furosemide (LASIX) 20 mg tablet 2022 at Unknown time  No Yes   Sig: take 2 tablets by mouth twice a day   Patient taking differently: Pt takes 1 pill not 2   gabapentin (NEURONTIN) 300 mg capsule 2022 at Unknown time  No Yes   Sig: Take 1 capsule (300 mg total) by mouth 3 (three) times a day   hydrOXYzine HCL (ATARAX) 25 mg tablet Unknown at Unknown time  No No   Sig: TAKE 1 TABLET BY MOUTH EVERY 6 HOURS AS NEEDED FOR ANXIETY   ipratropium (Atrovent HFA) 17 mcg/act inhaler 2022 at Unknown time  No Yes   Sig: Inhale 2 puffs every 6 (six) hours   ipratropium-albuterol (DUO-NEB) 0 5-2 5 mg/3 mL nebulizer solution 2022 at Unknown time  No Yes   Sig: 3mg q 6 hours by nebulizer while awake     iron polysaccharides (Ferrex 150) 150 mg capsule 2022 at Unknown time  No Yes   Sig: Take 1 capsule (150 mg total) by mouth daily   lidocaine (LIDODERM) 5 %   No No   Sig: Apply 1 patch topically daily Remove & Discard patch within 12 hours or as directed by MD   magnesium oxide (MAG-OX) 400 mg Unknown at Unknown time  No No   Sig: take 1 tablet by mouth twice a day   metFORMIN (GLUCOPHAGE) 500 mg tablet 2022 at Unknown time  No Yes   Sig: take 1 tablet by mouth twice a day WITH A MEAL   metoprolol succinate (TOPROL-XL) 25 mg 24 hr tablet 2022 at Unknown time  No Yes   Sig: Take 3 tablets (75 mg total) by mouth every morning   montelukast (SINGULAIR) 10 mg tablet 1/15/2022 at Unknown time  No Yes   Sig: Take 1 tablet (10 mg total) by mouth daily at bedtime   omeprazole (PriLOSEC) 40 MG capsule 1/16/2022 at Unknown time  Yes Yes   Sig: Take 40 mg by mouth every morning    oxyCODONE-acetaminophen (PERCOCET) 5-325 mg per tablet 1/16/2022 at Unknown time  No Yes   Sig: Take 1 tablet by mouth every 6 (six) hours as needed for moderate painMax Daily Amount: 4 tablets   polyethylene glycol (MIRALAX) 17 g packet   No No   Sig: Take 17 g by mouth daily   predniSONE 20 mg tablet 1/15/2022 at Unknown time  No Yes   Sig: Take 1 tablet (20 mg total) by mouth daily   predniSONE 5 mg tablet 1/16/2022 at Unknown time  No Yes   Sig: take 1 tablet by mouth twice a day with meals   psyllium (METAMUCIL) packet Not Taking at Unknown time Self No No   Sig: Take 1 packet by mouth 3 (three) times a day   Patient not taking: Reported on 1/10/2022    simethicone (MYLICON) 80 mg chewable tablet Not Taking at Unknown time Self No No   Sig: Chew 1 tablet (80 mg total) every 6 (six) hours as needed for flatulence   Patient not taking: Reported on 1/16/2022    theophylline (CORTNEY-24) 200 MG 24 hr capsule 1/16/2022 at Unknown time  No Yes   Sig: Take 1 capsule (200 mg total) by mouth daily      Facility-Administered Medications: None       Past Medical History:   Diagnosis Date    Asthma     Chronic diastolic CHF (congestive heart failure) (HCC)     DJD (degenerative joint disease)     Paroxysmal atrial fibrillation (HCC)     Shortness of breath        Past Surgical History:   Procedure Laterality Date    BLADDER SURGERY      COLON SURGERY      COLONOSCOPY      COLONOSCOPY W/ POLYPECTOMY N/A 1/21/2019    Procedure: COLONOSCOPY W/ POLYPECTOMY;  Surgeon: Lavonna Runner, MD;  Location: [address] [place] [address] GI LAB; Service: General    EGD      FL GUIDED NEEDLE PLAC BX/ASP/INJ  9/2/2020    FL GUIDED NEEDLE PLAC BX/ASP/INJ  1/21/2021    GASTROJEJUNOSTOMY W/ JEJUNOSTOMY TUBE N/A 2/3/2020    Procedure: Antrectomy with bilroth II Anastomosis;   Surgeon: Orlando Miguel MD;  Location: [facility] OR;  Service: General    VT INJECTION,SACROILIAC JOINT Right 2020    Procedure: BLOCK / INJECTION SACROILIAC;  Surgeon: Dada Glaser MD;  Location: [facility];  Service: Pain Management     VT INJECTION,SACROILIAC JOINT Right 2021    Procedure: RIGHT SACROILIAC JOINT INJECTION;  Surgeon: Dada Glaser MD;  Location: [facility];  Service: Pain Management     SMALL INTESTINE SURGERY  2020    with partial gastrectomy       Family History   Problem Relation Age of Onset    Heart disease Mother      I have reviewed and agree with the history as documented  E-Cigarette/Vaping    E-Cigarette Use Never User      E-Cigarette/Vaping Substances    Nicotine No     THC No     CBD No     Flavoring No     Other No     Unknown No      Social History     Tobacco Use    Smoking status: Former Smoker     Quit date: 2013     Years since quittin 1    Smokeless tobacco: Never Used   Vaping Use    Vaping Use: Never used   Substance Use Topics    Alcohol use: Never     Alcohol/week: 0 0 standard drinks    Drug use: Never       Review of Systems   Constitutional: Negative for chills and fever  HENT: Negative for congestion, nosebleeds, rhinorrhea and sore throat  Eyes: Negative for pain and visual disturbance  Respiratory: Positive for cough and shortness of breath  Negative for wheezing  Cardiovascular: Negative for chest pain and leg swelling  Gastrointestinal: Negative for abdominal distention, abdominal pain, diarrhea, nausea and vomiting  Genitourinary: Negative for dysuria and frequency  Musculoskeletal: Negative for back pain and joint swelling  Skin: Negative for rash and wound  Neurological: Negative for weakness and numbness  Psychiatric/Behavioral: Negative for decreased concentration and suicidal ideas  Physical Exam  Physical Exam  Vitals and nursing note reviewed  Constitutional:       Appearance: She is well-developed     HENT:      Head: Normocephalic and atraumatic  Eyes:      Conjunctiva/sclera: Conjunctivae normal       Pupils: Pupils are equal, round, and reactive to light  Neck:      Trachea: No tracheal deviation  Cardiovascular:      Rate and Rhythm: Tachycardia present  Rhythm irregular  Heart sounds: Normal heart sounds  No murmur heard  Pulmonary:      Effort: Pulmonary effort is normal  No respiratory distress  Breath sounds: Wheezing present  No rales  Abdominal:      General: Bowel sounds are normal  There is no distension  Palpations: Abdomen is soft  Tenderness: There is no abdominal tenderness  Musculoskeletal:         General: No deformity  Cervical back: Normal range of motion and neck supple  Skin:     General: Skin is warm and dry  Capillary Refill: Capillary refill takes less than 2 seconds  Neurological:      Mental Status: She is alert and oriented to person, place, and time  Sensory: No sensory deficit     Psychiatric:         Judgment: Judgment normal          Vital Signs  ED Triage Vitals   Temperature Pulse Respirations Blood Pressure SpO2   01/16/22 0740 01/16/22 0740 01/16/22 0740 01/16/22 0740 01/16/22 0740   98 9 °F (37 2 °C) (!) 159 (!) 26 108/64 99 %      Temp Source Heart Rate Source Patient Position - Orthostatic VS BP Location FiO2 (%)   01/16/22 0740 01/16/22 0740 01/16/22 0740 01/16/22 0740 --   Temporal Monitor Sitting Left arm       Pain Score       01/16/22 1600       No Pain           Vitals:    01/17/22 1000 01/17/22 1100 01/17/22 1200 01/17/22 1300   BP:  97/56 (!) 85/53 99/55   Pulse: 72 78 82 89   Patient Position - Orthostatic VS:             Visual Acuity  Visual Acuity      Most Recent Value   L Pupil Size (mm) 3   R Pupil Size (mm) 3   L Pupil Shape Round   R Pupil Shape Round          ED Medications  Medications   sodium chloride 0 9 % inhalation solution 3 mL (3 mL Nebulization Not Given 1/16/22 0812)   aspirin (ECOTRIN LOW STRENGTH) EC tablet 81 mg (81 mg Oral Not Given 1/16/22 1244)   budesonide (PULMICORT) inhalation solution 0 5 mg (0 5 mg Nebulization Refused 1/17/22 0742)   ipratropium (ATROVENT) 0 02 % inhalation solution 0 5 mg (0 5 mg Nebulization Refused 1/17/22 1316)   levalbuterol (XOPENEX) inhalation solution 1 25 mg (1 25 mg Nebulization Refused 1/17/22 1317)   cholecalciferol (VITAMIN D3) tablet 400 Units (400 Units Oral Not Given 1/17/22 0855)   montelukast (SINGULAIR) tablet 10 mg (10 mg Oral Given 1/16/22 2102)   pantoprazole (PROTONIX) EC tablet 40 mg (40 mg Oral Given 1/17/22 0526)   heparin (porcine) 25,000 units in 0 45% NaCl 250 mL infusion (premix) (16 Units/kg/hr × 55 kg (Order-Specific) Intravenous Rate/Dose Change 1/17/22 0859)   heparin (porcine) injection 3,300 Units (3,300 Units Intravenous Given 1/16/22 1926)   heparin (porcine) injection 1,650 Units (has no administration in time range)   diltiazem (CARDIZEM) 125 mg in sodium chloride 0 9 % 125 mL infusion (0 mg/hr Intravenous Stopped 1/17/22 0904)   insulin regular (HumuLIN R,NovoLIN R) 1 Units/mL in sodium chloride 0 9 % 100 mL infusion (1 5 Units/hr Intravenous Rate/Dose Change 1/17/22 1210)   lidocaine (PF) (XYLOCAINE-MPF) 1 % injection **ADS Override Pull** (has no administration in time range)   acetaminophen (TYLENOL) tablet 650 mg (650 mg Oral Given 1/16/22 2102)   cefepime (MAXIPIME) IVPB (premix in dextrose) 1,000 mg 50 mL (1,000 mg Intravenous New Bag 1/17/22 1300)   metroNIDAZOLE (FLAGYL) tablet 500 mg (500 mg Oral Not Given 1/17/22 0857)   iron polysaccharides (FERREX) capsule 150 mg (150 mg Oral Not Given 1/17/22 0855)   ondansetron (ZOFRAN) injection 4 mg (4 mg Intravenous Given 1/17/22 0237)   polyethylene glycol (MIRALAX) packet 17 g (17 g Oral Not Given 1/17/22 0855)   senna-docusate sodium (SENOKOT S) 8 6-50 mg per tablet 2 tablet (2 tablets Oral Not Given 1/17/22 0855)   bisacodyl (DULCOLAX) rectal suppository 10 mg (10 mg Rectal Given 1/17/22 0541)   vancomycin (VANCOCIN) IVPB (premix in dextrose) 750 mg 150 mL (has no administration in time range)   methylPREDNISolone sodium succinate (Solu-MEDROL) injection 40 mg (has no administration in time range)   acetylcysteine (ACETADOTE) 8,880 mg in dextrose 5 % 200 mL IVPB (8,880 mg Intravenous New Bag 1/17/22 1323)     Followed by   acetylcysteine (ACETADOTE) 2,960 mg in dextrose 5 % 500 mL IVPB (2,960 mg Intravenous New Bag 1/17/22 1431)     Followed by   acetylcysteine (ACETADOTE) 5,920 mg in dextrose 5 % 1,000 mL IVPB (has no administration in time range)   methylPREDNISolone sodium succinate (Solu-MEDROL) injection 60 mg (60 mg Intravenous Given 1/16/22 0802)   albuterol inhalation solution 2 5 mg (2 5 mg Nebulization Given 1/16/22 0806)   cefTRIAXone (ROCEPHIN) IVPB (premix in dextrose) 1,000 mg 50 mL (0 mg Intravenous Stopped 1/16/22 1401)   azithromycin (ZITHROMAX) 500 mg in sodium chloride 0 9 % 250 mL IVPB (0 mg Intravenous Stopped 1/16/22 1401)   diltiazem (CARDIZEM) injection 10 mg (10 mg Intravenous Given 1/16/22 0838)   diltiazem (CARDIZEM) injection 20 mg (20 mg Intravenous Given 1/16/22 0852)   diltiazem (CARDIZEM) 125 mg in sodium chloride 0 9 % 125 mL infusion (7 5 mg/hr Intravenous Rate/Dose Change 1/16/22 1014)   sodium chloride 0 9 % bolus 500 mL (0 mL Intravenous Stopped 1/16/22 1402)   methylPREDNISolone sodium succinate (Solu-MEDROL) injection 65 mg (65 mg Intravenous Given 1/16/22 1241)   metoprolol (LOPRESSOR) injection 5 mg (5 mg Intravenous Given 1/16/22 1707)   metoprolol (LOPRESSOR) injection 5 mg (5 mg Intravenous Given 1/17/22 0009)   multi-electrolyte (ISOLYTE-S PH 7 4) bolus 500 mL (500 mL Intravenous New Bag 1/17/22 0123)   vancomycin (VANCOCIN) 1,250 mg in sodium chloride 0 9 % 250 mL IVPB (1,250 mg Intravenous New Bag 1/17/22 0224)   iohexol (OMNIPAQUE) 240 MG/ML solution 50 mL (50 mL Oral Given 1/17/22 0226)   multi-electrolyte (ISOLYTE-S PH 7 4) bolus 500 mL (500 mL Intravenous New Bag 1/17/22 0315)   iohexol (OMNIPAQUE) 350 MG/ML injection (SINGLE-DOSE) 90 mL (90 mL Intravenous Given 1/17/22 0314)       Diagnostic Studies  Results Reviewed     Procedure Component Value Units Date/Time    Strep Pneumoniae, Urine [[id number]]  (Normal) Collected: 01/16/22 1458    Lab Status: Final result Specimen: Urine, Other Updated: 01/17/22 1117     Strep pneumoniae antigen, urine Negative    Procalcitonin Reflex [[id number]]  (Abnormal) Collected: 01/17/22 0756    Lab Status: Final result Specimen: Blood from Arm, Right Updated: 01/17/22 0838     Procalcitonin 1 94 ng/ml     APTT six (6) hours after Heparin bolus/drip initiation or dosing change [[id number]]  (Abnormal) Collected: 01/17/22 0756    Lab Status: Final result Specimen: Blood from Arm, Right Updated: 01/17/22 0831     PTT 80 seconds     APTT six (6) hours after Heparin bolus/drip initiation or dosing change [[id number]]     Lab Status: No result Specimen: Blood     Hemoglobin A1c w/EAG Estimation (Orders if not completed within the last 90 days) [[id number]]  (Abnormal) Collected: 01/16/22 1830    Lab Status: Final result Specimen: Blood from Arm, Right Updated: 01/17/22 0349     Hemoglobin A1C 9 2 %       mg/dl     APTT six (6) hours after Heparin bolus/drip initiation or dosing change [[id number]]  (Abnormal) Collected: 01/17/22 0105    Lab Status: Final result Specimen: Blood from Central Venous Line Updated: 01/17/22 0122     PTT 77 seconds     APTT six (6) hours after Heparin bolus/drip initiation or dosing change [[id number]]  (Normal) Collected: 01/16/22 1830    Lab Status: Final result Specimen: Blood from Arm, Right Updated: 01/16/22 1905     PTT 30 seconds     Blood culture #1 [[id number]] Collected: 01/16/22 0835    Lab Status: Preliminary result Specimen: Blood from Arm, Right Updated: 01/16/22 1601     Blood Culture Received in Microbiology Lab  Culture in Progress      Blood culture #2 [[id number]] Collected: 01/16/22 0835    Lab Status: Preliminary result Specimen: Blood from Arm, Right Updated: 01/16/22 1601     Blood Culture Received in Microbiology Lab  Culture in Progress  Urine Microscopic [[id number]]  (Abnormal) Collected: 01/16/22 1455    Lab Status: Final result Specimen: Urine, Other Updated: 01/16/22 1521     RBC, UA 4-10 /hpf      WBC, UA Innumerable /hpf      Epithelial Cells Moderate /hpf      Bacteria, UA Moderate /hpf      Hyaline Casts, UA 1-2 /lpf      MUCUS THREADS Occasional    Urine culture [[id number]] Collected: 01/16/22 1455    Lab Status: In process Specimen: Urine, Other Updated: 01/16/22 1521    UA w Reflex to Microscopic w Reflex to Culture [[id number]]  (Abnormal) Collected: 01/16/22 1455    Lab Status: Final result Specimen: Urine, Other Updated: 01/16/22 1501     Color, UA Yellow     Clarity, UA Cloudy     Specific Gravity, UA 1 025     pH, UA 6 0     Leukocytes, UA 1+     Nitrite, UA Negative     Protein, UA Negative mg/dl      Glucose, UA 3+ mg/dl      Ketones, UA Trace mg/dl      Urobilinogen, UA 0 2 E U /dl      Bilirubin, UA Negative     Blood, UA Trace-Intact    Fingerstick Glucose (POCT) [[id number]]  (Abnormal) Collected: 01/16/22 1241    Lab Status: Final result Updated: 01/16/22 1247     POC Glucose 466 mg/dl     Lactic acid 2 Hours [[id number]]  (Abnormal) Collected: 01/16/22 1047    Lab Status: Final result Specimen: Blood from Arm, Left Updated: 01/16/22 1224     LACTIC ACID 6 6 mmol/L     Narrative:      Result may be elevated if tourniquet was used during collection      APTT six (6) hours after Heparin bolus/drip initiation or dosing change [[id number]]     Lab Status: No result Specimen: Blood     HS Troponin I 4hr [[id number]] Collected: 01/16/22 1054    Lab Status: Final result Specimen: Blood Updated: 01/16/22 1123     hs TnI 4hr 30 ng/L      Delta 4hr hsTnI -2 ng/L     CBC and differential [[id number]]  (Abnormal) Collected: 01/16/22 0732    Lab Status: Final result Specimen: Blood from Arm, Right Updated: 01/16/22 0926     WBC 16 19 Thousand/uL      RBC 4 71 Million/uL      Hemoglobin 8 9 g/dL      Hematocrit 31 5 %      MCV 67 fL      MCH 18 9 pg      MCHC 28 3 g/dL      RDW 20 9 %      MPV 9 7 fL      Platelets 488 Thousands/uL     Narrative: This is an appended report  These results have been appended to a previously verified report  Manual Differential(PHLEBS Do Not Order) [[id number]]  (Abnormal) Collected: 01/16/22 0749    Lab Status: Final result Specimen: Blood from Arm, Right Updated: 01/16/22 0926     Segmented % 91 %      Bands % 3 %      Lymphocytes % 4 %      Monocytes % 2 %      Eosinophils, % 0 %      Basophils % 0 %      Absolute Neutrophils 15 22 Thousand/uL      Lymphocytes Absolute 0 65 Thousand/uL      Monocytes Absolute 0 32 Thousand/uL      Eosinophils Absolute 0 00 Thousand/uL      Basophils Absolute 0 00 Thousand/uL      Total Counted --     RBC Morphology Present     Anisocytosis Present     Hypochromia Present     Ovalocytes Present     Poikilocytes Present     Polychromasia Present     Platelet Estimate Adequate    Procalcitonin with AM Reflex [[id number]]  (Normal) Collected: 01/16/22 0835    Lab Status: Final result Specimen: Blood from Arm, Right Updated: 01/16/22 0915     Procalcitonin 0 21 ng/ml     Lactic acid [[id number]]  (Abnormal) Collected: 01/16/22 0823    Lab Status: Final result Specimen: Blood from Arm, Right Updated: 01/16/22 0905     LACTIC ACID 6 4 mmol/L     Narrative:      Result may be elevated if tourniquet was used during collection      B-Type Natriuretic Peptide(BNP) CA, GH, EA Campuses Only [[id number]]  (Abnormal) Collected: 01/16/22 0749    Lab Status: Final result Specimen: Blood from Arm, Right Updated: 01/16/22 0847      pg/mL     COVID/FLU/RSV - 2 hour TAT [[id number]]  (Normal) Collected: 01/16/22 0749    Lab Status: Final result Specimen: Nares from Nose Updated: 01/16/22 0833     SARS-CoV-2 Negative     INFLUENZA A PCR Negative INFLUENZA B PCR Negative     RSV PCR Negative    Narrative:      FOR PEDIATRIC PATIENTS - copy/paste COVID Guidelines URL to browser: [email or web address]    SARS-CoV-2 assay is a Nucleic Acid Amplification assay intended for the  qualitative detection of nucleic acid from SARS-CoV-2 in nasopharyngeal  swabs  Results are for the presumptive identification of SARS-CoV-2 RNA  Positive results are indicative of infection with SARS-CoV-2, the virus  causing COVID-19, but do not rule out bacterial infection or co-infection  with other viruses  Laboratories within the [place] and its  territories are required to report all positive results to the appropriate  public health authorities  Negative results do not preclude SARS-CoV-2  infection and should not be used as the sole basis for treatment or other  patient management decisions  Negative results must be combined with  clinical observations, patient history, and epidemiological information  This test has not been FDA cleared or approved  This test has been authorized by FDA under an Emergency Use Authorization  (EUA)  This test is only authorized for the duration of time the  declaration that circumstances exist justifying the authorization of the  emergency use of an in vitro diagnostic tests for detection of SARS-CoV-2  virus and/or diagnosis of COVID-19 infection under section 564(b)(1) of  the Act, 21 U  S C  653NAF-9(T)(4), unless the authorization is terminated  or revoked sooner  The test has been validated but independent review by FDA  and CLIA is pending  Test performed using [employer] GeneXpert: This RT-PCR assay targets N2,  a region unique to SARS-CoV-2  A conserved region in the E-gene was chosen  for pan-Sarbecovirus detection which includes SARS-CoV-2      HS Troponin 0hr (reflex protocol) [[id number]]  (Normal) Collected: 01/16/22 0749    Lab Status: Final result Specimen: Blood from Arm, Right Updated: 01/16/22 0820     hs TnI 0hr 32 ng/L     Protime-INR [[id number]]  (Abnormal) Collected: 01/16/22 0749    Lab Status: Final result Specimen: Blood from Arm, Right Updated: 01/16/22 0817     Protime 14 6 seconds      INR 1 15    APTT [[id number]]  (Normal) Collected: 01/16/22 0749    Lab Status: Final result Specimen: Blood from Arm, Right Updated: 01/16/22 0817     PTT 25 seconds     Basic metabolic panel [[id number]]  (Abnormal) Collected: 01/16/22 0749    Lab Status: Final result Specimen: Blood from Arm, Right Updated: 01/16/22 0816     Sodium 130 mmol/L      Potassium 4 0 mmol/L      Chloride 87 mmol/L      CO2 26 mmol/L      ANION GAP 17 mmol/L      BUN 44 mg/dL      Creatinine 1 34 mg/dL      Glucose 414 mg/dL      Calcium 10 3 mg/dL      eGFR 36 ml/min/1 73sq m     Narrative:      [place] guidelines for Chronic Kidney Disease (CKD):     Stage 1 with normal or high GFR (GFR > 90 mL/min/1 73 square meters)    Stage 2 Mild CKD (GFR = 60-89 mL/min/1 73 square meters)    Stage 3A Moderate CKD (GFR = 45-59 mL/min/1 73 square meters)    Stage 3B Moderate CKD (GFR = 30-44 mL/min/1 73 square meters)    Stage 4 Severe CKD (GFR = 15-29 mL/min/1 73 square meters)    Stage 5 End Stage CKD (GFR <15 mL/min/1 73 square meters)  Note: GFR calculation is accurate only with a steady state creatinine    Magnesium [[id number]]  (Normal) Collected: 01/16/22 0749    Lab Status: Final result Specimen: Blood from Arm, Right Updated: 01/16/22 0816     Magnesium 2 2 mg/dL                  CT chest abdomen pelvis w contrast   Final Result by Roman Dominguez MD (01/17 4023)      There are small bilateral pleural effusions, right larger than left  There is septal thickening and interstitial coarsening  These findings suggest fluid overload/CHF  Clinical correlation and follow-up is recommended        Abnormal appearance of the liver, as described above, possibly related to congestive hepatopathy  Clinical correlation, laboratory correlation and follow-up is recommended  Sclerotic appearing compression deformity of T10 is new compared with December 24, 2020  If clinically appropriate, this could be further evaluated with MRI, if there are no contraindications  There is some gas layering non-dependently in the right heart and main pulmonary artery, likely iatrogenic  Portions of the esophagus are dilated and there is oral contrast material within the esophagus extending to the cervical esophagus  This suggests marked gastroesophageal reflux  There is a moderate amount of stool in the colon suggesting a degree of constipation  Moderate to advanced emphysema  Other nonemergent findings, as described above  Please see discussion  Workstation performed: [id number]         XR chest portable ICU   Final Result by Stephen Reeves MD (32/54 [id number])   1  Bibasilar opacities compatible small pleural effusions and adjacent atelectasis or infection  2   Chronic interstitial changes  Workstation performed: [id number]         XR chest 1 view portable   Final Result by Jovanni Hawkins MD (01/17 1916)      Cardiomegaly  Mild vascular congestion  Small bilateral pleural effusions                    Workstation performed: [id number]         US right upper quadrant with liver dopplers    (Results Pending)              Procedures  CriticalCare Time  Performed by: Alxeus Jang DO  Authorized by: Alexus Jang DO     Critical care provider statement:     Critical care time (minutes):  38    Critical care time was exclusive of:  Separately billable procedures and treating other patients    Critical care was necessary to treat or prevent imminent or life-threatening deterioration of the following conditions:  Cardiac failure    Critical care was time spent personally by me on the following activities:  Examination of patient, evaluation of patient's response to treatment, development of treatment plan with patient or surrogate, obtaining history from patient or surrogate, ordering and review of radiographic studies and ordering and review of laboratory studies  Comments:      afib rvr refractory to iv medications requiring titratable infusion, icu consultation             ED Course  ED Course as of 01/17/22 1452   Sun Jan 16, 2022   0814 Afib rvr, will give low dose of cardizem   9925 Single systolic of 721, But generally around 110+   0910 Lactate acid elevated, likely partly hypoxia driven  Evidence of fluid overload and pulmonary edema as well  Will give IVF very judiciously as 30cc/kg would surely worsening her respiratory status and likely lead to decompensation and death   0950 Starting cardizem drip  ICU to evaluate  Will manage until then  Dr Fadi Erickson agrees with IVF, low dose metoprolol as needed                               SBIRT 20yo+      Most Recent Value   SBIRT (25 yo +)    In order to provide better care to our patients, we are screening all of our patients for alcohol and drug use  Would it be okay to ask you these screening questions? Yes Filed at: 01/16/2022 0915   Initial Alcohol Screen: US AUDIT-C     1  How often do you have a drink containing alcohol? 0 Filed at: 01/16/2022 0915   2  How many drinks containing alcohol do you have on a typical day you are drinking? 0 Filed at: 01/16/2022 0915   3a  Male UNDER 65: How often do you have five or more drinks on one occasion? 0 Filed at: 01/16/2022 0915   3b  FEMALE Any Age, or MALE 65+: How often do you have 4 or more drinks on one occassion? 0 Filed at: 01/16/2022 0915   Audit-C Score 0 Filed at: 01/16/2022 7543   ILIA: How many times in the past year have you    Used an illegal drug or used a prescription medication for non-medical reasons?  Never Filed at: 01/16/2022 0915                    MDM  Number of Diagnoses or Management Options  Atrial fibrillation ([facility] ): new and requires workup  COPD (chronic obstructive pulmonary disease) ([place] [address] ): new and requires workup  Diagnosis management comments: Patient with shortness of breath, scattered wheezing, also AFib with rapid ventricular response  Overall she appears comfortable, will give neb treatment however will want to be cautious given her degree of tachycardia    Blood pressure is on low side of normal, 110s over 60s, diltiazem given cautiously his 10 mg and 20 mg however no change in her heart rate and diltiazem drip started  Amount and/or Complexity of Data Reviewed  Review and summarize past medical records: yes  Independent visualization of images, tracings, or specimens: yes    Risk of Complications, Morbidity, and/or Mortality  Presenting problems: high  Diagnostic procedures: minimal  Management options: high        Disposition  Final diagnoses:   Atrial fibrillation ([place] [address] )   COPD (chronic obstructive pulmonary disease) ([place] [address] )     Time reflects when diagnosis was documented in both MDM as applicable and the Disposition within this note     Time User Action Codes Description Comment    1/16/2022 12:55 PM Todd Dye Add [I48 91] Atrial fibrillation ([place] [address] )     1/16/2022 12:55 PM Todd Dye Add [J44 9] COPD (chronic obstructive pulmonary disease) ([place] [address] )     1/17/2022 12:59 AM Eric Pen Add [I48 91] Atrial fibrillation with RVR ([place] [address] )     1/17/2022 12:35 PM Basil [place] Add [R74 01] Transaminitis       ED Disposition     ED Disposition Condition Date/Time Comment    Admit Stable Sun Jan 16, 2022 11:31 AM Case was discussed with Scottie Barton and the patient's admission status was agreed to be Admission Status: inpatient status to the service of Dr Scottie Barton           Follow-up Information    None         Current Discharge Medication List      CONTINUE these medications which have NOT CHANGED    Details   apixaban (ELIQUIS) 2 5 mg Take 1 tablet (2 5 mg total) by mouth in the morning On Monday, Tuesday, Thursday, Friday  Qty: 60 tablet, Refills: 5    Associated Diagnoses: Atrial fibrillation with rapid ventricular response (HCC)      Artificial Tear Solution (GENTEAL TEARS) 0 1-0 2-0 3 % SOLN Administer 1 drop to both eyes 3 (three) times a day      aspirin (ECOTRIN LOW STRENGTH) 81 mg EC tablet Take 1 tablet (81 mg total) by mouth once a week Take on Sunday  Qty: 30 tablet, Refills: 0    Associated Diagnoses: Atrial fibrillation with rapid ventricular response (HCC)      budesonide-formoterol (Symbicort) 160-4 5 mcg/act inhaler Inhale 2 puffs 2 (two) times a day      Calcium Carb-Cholecalciferol (CALCIUM 1000 + D PO) Take 1,000 mg by mouth daily      cholecalciferol (VITAMIN D3) 400 units tablet Take 400 Units by mouth daily      diltiazem (CARDIZEM CD) 120 mg 24 hr capsule take 1 capsule by mouth once daily  Qty: 30 capsule, Refills: 5    Associated Diagnoses: Atrial fibrillation with rapid ventricular response (HCC)      fluticasone (FLONASE) 50 mcg/act nasal spray 1 spray into each nostril daily  Qty: 18 2 mL, Refills: 3    Associated Diagnoses: Other emphysema (HCC)      furosemide (LASIX) 20 mg tablet take 2 tablets by mouth twice a day  Qty: 120 tablet, Refills: 5    Associated Diagnoses: Chronic respiratory failure with hypoxia (HCC)      gabapentin (NEURONTIN) 300 mg capsule Take 1 capsule (300 mg total) by mouth 3 (three) times a day  Qty: 90 capsule, Refills: 0    Associated Diagnoses: Compression fracture of L4 vertebra with routine healing, subsequent encounter      ipratropium (Atrovent HFA) 17 mcg/act inhaler Inhale 2 puffs every 6 (six) hours  Qty: 38 7 g, Refills: 5    Associated Diagnoses: Other emphysema (HCC)      ipratropium-albuterol (DUO-NEB) 0 5-2 5 mg/3 mL nebulizer solution 3mg q 6 hours by nebulizer while awake    Qty: 360 mL, Refills: 7    Comments: 4 boxes/ 360 ml  Associated Diagnoses: COPD (chronic obstructive pulmonary disease) (HCC)      iron polysaccharides (Ferrex 150) 150 mg capsule Take 1 capsule (150 mg total) by mouth daily  Qty: 60 capsule, Refills: 5    Associated Diagnoses: Iron deficiency anemia      Januvia 50 MG tablet take 1 tablet by mouth once daily  Qty: 30 tablet, Refills: 5    Associated Diagnoses: Type 2 diabetes mellitus without complication, without long-term current use of insulin ([facility])      metFORMIN (GLUCOPHAGE) 500 mg tablet take 1 tablet by mouth twice a day WITH A MEAL  Qty: 60 tablet, Refills: 3    Associated Diagnoses: Type 2 diabetes mellitus without complication, without long-term current use of insulin ([facility])      metoprolol succinate (TOPROL-XL) 25 mg 24 hr tablet Take 3 tablets (75 mg total) by mouth every morning  Qty: 90 tablet, Refills: 5    Associated Diagnoses: Atrial fibrillation with rapid ventricular response ([facility])      montelukast (SINGULAIR) 10 mg tablet Take 1 tablet (10 mg total) by mouth daily at bedtime  Qty: 90 tablet, Refills: 1    Associated Diagnoses: Seasonal allergies      omeprazole (PriLOSEC) 40 MG capsule Take 40 mg by mouth every morning       oxyCODONE-acetaminophen (PERCOCET) 5-325 mg per tablet Take 1 tablet by mouth every 6 (six) hours as needed for moderate painMax Daily Amount: 4 tablets  Qty: 120 tablet, Refills: 0    Associated Diagnoses: Osteoporosis, unspecified osteoporosis type, unspecified pathological fracture presence; Chronic bilateral low back pain without sciatica      OXYGEN-HELIUM IN Inhale      !! predniSONE 20 mg tablet Take 1 tablet (20 mg total) by mouth daily  Qty: 90 tablet, Refills: 3    Associated Diagnoses: Chronic respiratory failure with hypoxia ([facility])      ! ! predniSONE 5 mg tablet take 1 tablet by mouth twice a day with meals  Qty: 60 tablet, Refills: 5    Associated Diagnoses: Chronic respiratory failure with hypoxia ([facility])      theophylline (CORTNEY-24) 200 MG 24 hr capsule Take 1 capsule (200 mg total) by mouth daily  Qty: 90 capsule, Refills: 0    Associated Diagnoses: Simple chronic bronchitis ([facility]) acetaminophen (TYLENOL) 325 mg tablet Take 650 mg by mouth every 4 (four) hours as needed for mild pain      alendronate (FOSAMAX) 70 mg tablet take 1 tablet by mouth every 7 days  Qty: 4 tablet, Refills: 5    Associated Diagnoses: Osteoporosis, unspecified osteoporosis type, unspecified pathological fracture presence      fluticasone-umeclidinium-vilanterol (Trelegy Ellipta) 100-62 5-25 MCG/INH inhaler Inhale 1 puff daily Rinse mouth after use  Qty: 180 each, Refills: 4    Associated Diagnoses: Simple chronic bronchitis (HCC)      hydrOXYzine HCL (ATARAX) 25 mg tablet TAKE 1 TABLET BY MOUTH EVERY 6 HOURS AS NEEDED FOR ANXIETY  Qty: 120 tablet, Refills: 0    Associated Diagnoses: Anxiety      lidocaine (LIDODERM) 5 % Apply 1 patch topically daily Remove & Discard patch within 12 hours or as directed by MD  Qty: 30 patch, Refills: 0    Associated Diagnoses: Compression fracture of L4 vertebra with routine healing, subsequent encounter      magnesium oxide (MAG-OX) 400 mg take 1 tablet by mouth twice a day  Qty: 60 tablet, Refills: 5    Associated Diagnoses: Gastroesophageal reflux disease without esophagitis; Functional diarrhea      polyethylene glycol (MIRALAX) 17 g packet Take 17 g by mouth daily  Qty: 510 g, Refills: 0    Associated Diagnoses: Constipation      psyllium (METAMUCIL) packet Take 1 packet by mouth 3 (three) times a day  Qty: 100 packet, Refills: 1    Associated Diagnoses: Diarrhea      simethicone (MYLICON) 80 mg chewable tablet Chew 1 tablet (80 mg total) every 6 (six) hours as needed for flatulence  Qty: 30 tablet, Refills: 0    Associated Diagnoses: Gastric outlet obstruction       !! - Potential duplicate medications found  Please discuss with provider  No discharge procedures on file      PDMP Review       Value Time User    PDMP Reviewed  Yes 11/1/2021  8:28 AM Raúl Wesley [facility]          ED Provider  Electronically Signed by           Joseph Ball DO  01/17/22 [address]

## 2022-01-16 NOTE — PROGRESS NOTES
Patient admitted into ICU with the following belongings:  1 Hearing aide for Left ear with black   Top and bottom dentures  Red cell phone  Black stand cell phoone   Blue and brown purse  3- $1 25 [facility] transit tickets with dollars bills  Blue hair comb  2 systane eye drop bottles  Blue albuterol inhaler  Dark blue underwear  Light blue underwear  2 black slippers  Grey sweatshirt  Black sweat shirt

## 2022-01-17 PROBLEM — R74.01 TRANSAMINITIS: Status: ACTIVE | Noted: 2022-01-01

## 2022-01-17 PROBLEM — N17.9 AKI (ACUTE KIDNEY INJURY) (HCC): Status: ACTIVE | Noted: 2022-01-01

## 2022-01-17 NOTE — ASSESSMENT & PLAN NOTE
· Presents with 3 days history of SOB and audible wheezing  · Typically wears 2-3 L NC at baseline - currently requiring 3-4 L NC  · Takes Singulair and mx inhalers including Symbicort, Trelegy Ellipta, Atrovent  · Recently was restarted on Theophylline and placed on Prednisone by PCP  · Received Solu Medrol 60 mg x1 in ED - will give additional 65 mg to equal 125 mg   · Will start scheduled Solu Medrol 40 mg Q8hrs  · Will hold inhalers and order Xopenex/Atrovent/Pulmicort nebs  · Hold Theophylline for now  · Continue Singulair  · Wean O2 for SpO2 > 88%

## 2022-01-17 NOTE — CASE MANAGEMENT
Case Management Assessment    Patient name Aimee Michele  Location ICU 05/ICU  MRN [id number]  : 1935 Date 2022       Current Admission Date: 2022  Current Admission Diagnosis:Sepsis [facility])   Patient Active Problem List    Diagnosis Date Noted    DEBI [facility]acute kidney [facility]) ([facility] ) 2022    Transaminitis 2022    COPD with acute exacerbation ([facility] ) 2022    Lactic acidosis 2022    Sepsis ([facility] ) 2022    High anion gap metabolic acidosis     Hair loss 2021    Medication management 10/31/2021    Easy bruising 10/04/2021    Essential hypertension     Iron deficiency anemia 2021    Type 2 diabetes mellitus without complication, without long-term current use of insulin ([facility] ) 2021    Ambulatory dysfunction     Uncomplicated opioid dependence ([facility] ) 2021    Chronic respiratory failure with hypoxia ([facility] ) 2021    Gastrostomy complication, unspecified ([facility] ) 2021    Shortness of breath 2020    At[facility] fibrillation with RVR ([facility])     Chronic diastolic CHF (congestive heart failure) ([facility])     Bronchitis, chronic, simple ([facility]) 2020    Sacroiliitis ([facility] ) 2020    Chronic pain syndrome 2020    Chronic peptic ulcer of stomach 2020    Compression fracture of L4 vertebra ([facility] ) 2020    Closed wedge compression fracture of T12 vertebra ([facility] ) 2020    Debility 10/11/2019    Chronic respiratory failure ([facility] ) 10/07/2019    Left elbow pain 10/06/2019    Colitis presumed infectious 10/03/2019    Effusion of bursa of left elbow 2019    Macular degeneration of both eyes 2019    Colorectal polyps 2019    Diverticulosis of colon 2019    History of colonic polyps 2019    Diarrhea 2018    Asthma 2018    Other emphysema ([address] ) 2018    Chronic bilateral low back pain without sciatica 2018    Lumbar radiculopathy 08/13/2018    Seasonal allergies 05/02/2018    Gastroesophageal reflux disease without esophagitis 03/28/2018    Osteoporosis 03/08/2018    Cardiomegaly 01/23/2018    Chronic anxiety 01/23/2018    History of angioedema 11/18/2017    Deviated nasal septum 10/20/2017    Gout 07/20/2016    Chronic cystitis 12/11/2013    Other specified forms of chronic ischemic heart disease 12/11/2013      LOS (days): 1  Geometric Mean LOS (GMLOS) (days): 4 80  Days to GMLOS:3 7     OBJECTIVE:    Risk of Unplanned Readmission Score: 44     Current admission status: Inpatient  Referral Reason:  (Discharge planning)    Preferred Pharmacy:   [address]  [address]  [place] [address]  Phone: [phone] Fax: [address] [address] [address]  Phone: [phone] Fax: [phone]    Primary Care Provider: LEEROY Faith    Primary Insurance: MEDICARE  Secondary Insurance: AARP    ASSESSMENT:  Active Health Care Agents    There are no active Health Care Agents on file         Advance Directives  Does patient have a [address]?: Yes  Does patient have Advance Directives?: Yes  Advance Directives: Living will,Power of  for health care  Primary Contact: Neha Sol and neighbor Yudelka Olivares  Readmission Root Cause  30 Day Readmission: No    Patient Information  Admitted from[de-identified] Home  Mental Status: Confused  During Assessment patient was accompanied by: Not accompanied during assessment  Assessment information provided by[de-identified] Jona Montoya - please comment (TC to Granddaughter Marlin Otto)  Primary Caregiver: Self  Support Systems: Edward and Asher of Residence: [place]  Type of Current Residence: [employer]  In the last 12 months, was there a time when you were not able to pay the mortgage or rent on time?: No  In the last 12 months, how many places have you lived?: 1  In the last 12 months, was there a time when you did not have a steady place to sleep or slept in a shelter (including now)?: No  Homeless/housing insecurity resource given?: N/A  Living Arrangements: Lives Alone  Is patient a ?: No    Activities of Daily Living Prior to Admission  Functional Status: Independent  Completes ADLs independently?: Yes  Ambulates independently?: Yes  Does patient use assisted devices?: Yes  Assisted Devices (DME) used: [address]  DME Company Name (respiratory supplies):  [employer]  O2 Rate(s): uses prn as per granddaughter  Does patient currently own DME?: Yes  What DME does the patient currently own?: Walker,Home Oxygen concentrator,Portable Oxygen tanks  Does patient have a history of Outpatient Therapy (PT/OT)?: No  Does the patient have a history of Short-Term Rehab?: Yes ([facility])  Patient Information Continued  Income Source: Pension/senior living  Does patient have prescription coverage?: Yes  Within the past 12 months, you worried that your food would run out before you got the money to buy more : Never true (Granddaughter states neighbor shops and she gets [employer] delivery)  Within the past 12 months, the food you bought just didnt last and you didnt have money to get more : Never true  Food insecurity resource given?: N/A  Does patient receive dialysis treatments?: No  Does patient have a history of substance abuse?: No  Does patient have a history of Mental Health Diagnosis?: No    PHQ 2/9 Screening   Reviewed PHQ 2/9 Depression Screening Score?: No    Means of Transportation  Means of Transport to Appts[de-identified] Friends  In the past 12 months, has lack of transportation kept you from medical appointments or from getting medications?: No  In the past 12 months, has lack of transportation kept you from meetings, work, or from getting things needed for daily living?: No  Was application for public transport provided?: N/A

## 2022-01-17 NOTE — ASSESSMENT & PLAN NOTE
Wt Readings from Last 3 Encounters:   01/16/22 58 1 kg (128 lb)   01/10/22 58 1 kg (128 lb)   11/01/21 59 5 kg (131 lb 3 2 oz)     · Echo in 02/2021 reveals EF 60% with mild concentric hypertrophy and G1DD; dilated LA; mild to moderate regurgitation  · Appears hypovolemic on exam  · Takes Lasix at home - will hold for now  · Currently receiving IVF resuscitation - will use caution given HF history  · Started on isolyte at 76 cc/hr for given DEBI and now s/p CT w/ contrast  · Strict I/O  · Daily weights

## 2022-01-17 NOTE — PROGRESS NOTES
Vancomycin Assessment    Danyel Gold is a 82 y o  female who is currently receiving vancomycin 750 mg IV q24h for other sepsis   Relevant clinical data and objective history reviewed:  Creatinine   Date Value Ref Range Status   01/17/2022 1 48 (H) 0 60 - 1 30 mg/dL Final     Comment:     Standardized to IDMS reference method   01/16/2022 1 34 (H) 0 60 - 1 30 mg/dL Final     Comment:     Standardized to IDMS reference method   11/01/2021 0 88 0 60 - 1 30 mg/dL Final     Comment:     Standardized to IDMS reference method   01/08/2016 0 61 0 60 - 1 30 mg/dL Final     Comment:     Standardized to IDMS reference method   10/01/2013 0 43 (L) 0 60 - 1 30 mg/dL Final     Comment:     Result Comment: Standardized to IDMS reference method     BP 97/56   Pulse 78   Temp 98 1 °F (36 7 °C)   Resp (!) 23   Wt 59 2 kg (130 lb 8 2 oz)   LMP  (LMP Unknown)   SpO2 93%   BMI 25 49 kg/m²   I/O last 3 completed shifts: In: 1713 8 [P O :480; I V :933 8; IV Piggyback:300]  Out: 580 [Urine:580]  Lab Results   Component Value Date/Time    BUN 50 (H) 01/17/2022 05:34 AM    BUN 13 01/08/2016 11:16 AM    WBC 23 43 (H) 01/17/2022 05:34 AM    WBC 7 4 05/08/2018 11:38 AM    HGB 7 5 (L) 01/17/2022 05:34 AM    HGB 13 3 05/08/2018 11:38 AM    HCT 27 7 (L) 01/17/2022 05:34 AM    HCT 40 9 05/08/2018 11:38 AM    MCV 68 (L) 01/17/2022 05:34 AM    MCV 88 1 05/08/2018 11:38 AM     01/17/2022 05:34 AM     05/08/2018 11:38 AM     Temp Readings from Last 3 Encounters:   01/17/22 98 1 °F (36 7 °C)   01/10/22 (!) 96 5 °F (35 8 °C)   11/01/21 (!) 97 2 °F (36 2 °C)     Vancomycin Days of Therapy: 1    Assessment/Plan  The patient is currently on vancomycin utilizing scheduled dosing based on actual body weight  Baseline risks associated with therapy include: pre-existing renal impairment, advanced age, and dehydration  The patient received 1250 mg once and after clinical evaluation will be changed to 750 mg IV q24hr  Pharmacy will also follow closely for s/sx of nephrotoxicity, infusion reactions, and appropriateness of therapy  BMP and CBC will be ordered per protocol  Plan for trough as patient approaches steady state, prior to the 4th  dose at approximately at 2130 on 1/19  Due to infection severity, will target a trough of 15-20 (appropriate for most indications)   Pharmacy will continue to follow the patients culture results and clinical progress daily      Delpha Goldmann, Pharmacist

## 2022-01-17 NOTE — ASSESSMENT & PLAN NOTE
· Presented with 3 days of SOB, sputum production  · AEB AF w/ RVR in 160s, tachypnea, leukocytosis, elevated lactic acid, DEBI  · CXR reveals possible RLL infiltrate  · COVID/FLU/RSV negative  · Blood cultures pending  · Will order UA, urine strep/legionella  · Will order sputum culture  · Initial lactic acid 6 4 - repeat 10 4  · CT C/A/P pending  · Check procalcitonin  · Started on Ceftriaxone/Azithromycin in ED, escalated to cefepime/vanc/flagyl  · Trend lactic acid  · Monitor fever and WBC curve  · Follow-up cultures

## 2022-01-17 NOTE — PLAN OF CARE
Problem: Potential for Falls  Goal: Patient will remain free of falls  Description: INTERVENTIONS:  - Educate patient/family on patient safety including physical limitations  - Instruct patient to call for assistance with activity   - Consult OT/PT to assist with strengthening/mobility   - Keep Call bell within reach  - Keep bed low and locked with side rails adjusted as appropriate  - Keep care items and personal belongings within reach  - Initiate and maintain comfort rounds  - Make Fall Risk Sign visible to staff  - Offer Toileting every  Hours, in advance of need  - Initiate/Maintain alarm  - Obtain necessary fall risk management equipment  - Apply yellow socks and bracelet for high fall risk patients  - Consider moving patient to room near nurses station  1/17/2022 1525 by Klaudia Zavaleta RN  Outcome: Progressing  1/17/2022 1524 by Klaudia Zavaleta RN  Outcome: Progressing     Problem: PAIN - ADULT  Goal: Verbalizes/displays adequate comfort level or baseline comfort level  Description: Interventions:  - Encourage patient to monitor pain and request assistance  - Assess pain using appropriate pain scale  - Administer analgesics based on type and severity of pain and evaluate response  - Implement non-pharmacological measures as appropriate and evaluate response  - Consider cultural and social influences on pain and pain management  - Notify physician/advanced practitioner if interventions unsuccessful or patient reports new pain  Outcome: Progressing     Problem: INFECTION - ADULT  Goal: Absence or prevention of progression during hospitalization  Description: INTERVENTIONS:  - Assess and monitor for signs and symptoms of infection  - Monitor lab/diagnostic results  - Monitor all insertion sites, i e  indwelling lines, tubes, and drains  - Monitor endotracheal if appropriate and nasal secretions for changes in amount and color  - [place] appropriate cooling/warming therapies per order  - Administer medications as ordered  - Instruct and encourage patient and family to use good hand hygiene technique  - Identify and instruct in appropriate isolation precautions for identified infection/condition  Outcome: Progressing

## 2022-01-17 NOTE — ASSESSMENT & PLAN NOTE
· Lactic acid 6 4 on admission  · Suspect this is largely in setting of sepsis/hypovolemia  · Currently receiving IVF   · Continue to trend

## 2022-01-17 NOTE — TREATMENT PLAN
Medication List    Duoneb  Atrovent inhaler  Prednisone 20 mg daily  ASA 81  Januvia 50  Metformin 500  Diltiazem 120  Pantoprazole 40  MgOx 400  Metoprolol 25 XL

## 2022-01-17 NOTE — ASSESSMENT & PLAN NOTE
· Hgb 8 9 on admission  · Baseline appears to be around 8-10  · Continue home iron supplementation  · Trend hgb  · Monitor for bleeding

## 2022-01-17 NOTE — ASSESSMENT & PLAN NOTE
· AG 17 with normal bicarb on admission  · Suspect this is 2/2 lactic acidosis vs DEBI with uremia as well as theophylline use  · Beta hydroxybutyrate normal  · APAP and ASA levels normal  · Avoid theophylline at this time  · Trend AG, lactic acid

## 2022-01-17 NOTE — ASSESSMENT & PLAN NOTE
· Wears 2-3L NC at baseline  · Currently requiring 3-4L NC - diffuse wheezing noted on exam  · Continue plan as noted above and wean O2 for goal SpO2 > 88%

## 2022-01-17 NOTE — ASSESSMENT & PLAN NOTE
Wt Readings from Last 3 Encounters:   01/16/22 58 1 kg (128 lb)   01/10/22 58 1 kg (128 lb)   11/01/21 59 5 kg (131 lb 3 2 oz)     · Echo in 02/2021 reveals EF 60% with mild concentric hypertrophy and G1DD; dilated LA; mild to moderate regurgitation  · Appears hypovolemic on exam  · Takes Lasix at home - will hold for now  · Currently receiving IVF resuscitation - will use caution given HF history  · Strict I/O  · Daily weights

## 2022-01-17 NOTE — H&P
[address]  1935, 82 y o  female MRN: [id number]  Unit/Bed#: ICU 05-01 Encounter: [id number]  Primary Care Provider: LEEROY Espinoza   Date and time admitted to hospital: 1/16/2022  7:23 AM    * Sepsis [facility])  Assessment & Plan  · Presented with 3 days of SOB, sputum production  · AEB AF w/RVR in 160s, tachypnea, leukocytosis, elevated lactic acid, DEBI  · CXR reveals possible RLL infiltrate  · COVID/FLU/RSV negative  · Blood cultures pending  · Will order UA, urine strep/legionella  · Will order sputum culture  · Initial lactic acid 6 4  · Check procalcitonin  · Currently receiving 500 ml bolus  · Continue to re-evaluate fluid status - will likely give additional IVF resuscitation in setting of elevated lactic/presumed sepsis/dehydration, however will use caution given heart failure history  · Started on Ceftriaxone/Azithromycin in ED - will continue   · Trend lactic acid  · Monitor fever and WBC curve  · Follow-up cultures    Atrial fibrillation with RVR (HCC)  Assessment & Plan  · Presented in AF w/RVR in 160s  · Suspect this is in setting of sepsis vs COPD exacerbation  · Hx of pAF - on Cardizem 120 mg daily, Toprol XL 75 mg daily; AC w/Eliquis  · Received IV Cardizem push 10 mg x1, followed by 20 mg x1 and subsequently started on Cardizem gtt in ED  · Troponin 32 - 30  · Will start Heparin gtt now  · Titrate Cardizem gtt for goal HR < 120  · Will hold off on beta blocker at this time given diffuse wheezing on exam  · Consider Amiodarone gtt if unable to obtain rate control and/or beta blocker if wheezing improves  · Optimize electrolytes  · Trend troponin  · Continue telemetry monitoring    COPD with acute exacerbation (HCC)  Assessment & Plan  · Presents with 3 days history of SOB and audible wheezing  · Typically wears 2-3 L NC at baseline - currently requiring 3-4 L NC  · Takes Singulair and mx inhalers including Symbicort, Trelegy Ellipta, Atrovent  · Recently was restarted on Theophylline and placed on Prednisone by PCP  · Received Solu Medrol 60 mg x1 in ED - will give additional 65 mg to equal 125 mg   · Will start scheduled Solu Medrol 40 mg Q8hrs  · Will hold inhalers and order Xopenex/Atrovent/Pulmicort nebs  · Hold Theophylline for now  · Continue Singulair  · Wean O2 for SpO2 > 88%    High anion gap metabolic acidosis  Assessment & Plan  · AG 17 with normal bicarb on admission  · Suspect this is 2/2 lactic acidosis vs DEBI with uremia as well as theophylline use  · Check BHB to rule out DKA  · Check APAP, ASA levels  · Avoid theophylline at this time  · Trend AG, lactic acid    Lactic acidosis  Assessment & Plan  · Lactic acid 6 4 on admission  · Suspect this is largely in setting of sepsis/hypovolemia  · Currently receiving IVF   · Continue to trend    DEBI (acute kidney injury) ([facility] [address] )  Assessment & Plan  · Creatinine 1 34 on admission  · Baseline appears to be around 0 5 - 0 7  · Suspect this is in setting of decreased PO intake, diuretic use, theophylline use, sepsis  · Takes Lasix at home - will hold at this time  · Continue IVF resuscitation in setting of sepsis  · Avoid nephrotoxic agents and hypotension  · Trend renal indices  · Strict I/O  · Daily weights    Type 2 diabetes mellitus without complication, without long-term current use of insulin [facility])  Assessment & Plan  Lab Results   Component Value Date    HGBA1C 6 7 (H) 11/01/2021       Recent Labs     01/16/22  1608 01/16/22  1840 01/16/22  2032 01/16/22  2247   POCGLU 419* 354* 305* 205*       Blood Sugar Average: Last 72 hrs:  (P) 349 8     · Takes Metformin and Januvia at home - hold for now  · Will order insulin gtt  · Fingersticks Q2hrs  · Diabetic diet    Essential hypertension  Assessment & Plan  · Takes Toprol XL, Cardizem, Lasix at home - will hold for now  · See plan above    Iron deficiency anemia  Assessment & Plan  · Hgb 8 9 on admission  · Baseline appears to be around 8-10  · Continue home iron supplementation  · Trend hgb  · Monitor for bleeding    Chronic diastolic CHF (congestive heart failure) ([facility])  Assessment & Plan  Wt Readings from Last 3 Encounters:   01/16/22 58 1 kg (128 lb)   01/10/22 58 1 kg (128 lb)   11/01/21 59 5 kg (131 lb 3 2 oz)     · Echo in 02/2021 reveals EF 60% with mild concentric hypertrophy and G1DD; dilated LA; mild to moderate regurgitation  · Appears hypovolemic on exam  · Takes Lasix at home - will hold for now  · Currently receiving IVF resuscitation - will use caution given HF history  · Strict I/O  · Daily weights        Chronic respiratory failure ([address] )  Assessment & Plan  · Wears 2-3L NC at baseline  · Currently requiring 3-4L NC - diffuse wheezing noted on exam  · Continue plan as noted above and wean O2 for goal SpO2 > 88%    Gastroesophageal reflux disease without esophagitis  Assessment & Plan  · Continue home PPI    -------------------------------------------------------------------------------------------------------------  Chief Complaint: SOB, sputum production    History of Present Illness     James Reynolds is a 82 y o  female w/PMHx of diastolic HF, pAF - AC w/Eliquis, HTN, COPD - 2-3L NC at baseline, DM2, GERD, BRUNILDA who presents with SOB and sputum production x3 days  Patient states that she saw her PCP about 2 weeks ago, "but they didn't do anything " Upon review, it appears that patient was restarted on theophylline and was placed on prednisone 20 mg daily at time of appointment  She denies any fever, chills, chest pain, dizziness, lightheadedness, abdominal pain  She states that she has been urinating and having BMs "like normal " She does endorse decreased PO intake for past 3 days  She denies any sick contacts and states that she is compliant with medications  Patient will be admitted to ICU for further work-up and monitoring       History obtained from the patient   -------------------------------------------------------------------------------------------------------------  Dispo: Admit to Stepdown Level 1    Code Status: Level 1 - Full Code  --------------------------------------------------------------------------------------------------------------  Review of Systems   Constitutional: Positive for appetite change  Negative for chills and fever  HENT: Positive for congestion  Eyes:        Endorses macular degeneration   Respiratory: Positive for shortness of breath and wheezing  Negative for cough  Cardiovascular: Negative for chest pain and palpitations  Gastrointestinal: Negative for abdominal distention, abdominal pain, constipation, diarrhea, nausea and vomiting  Genitourinary: Negative for difficulty urinating  Neurological: Negative for dizziness and light-headedness  Psychiatric/Behavioral: Negative for confusion  Physical Exam  Vitals and nursing note reviewed  Constitutional:       General: She is awake  She is not in acute distress  Appearance: She is ill-appearing  Interventions: Nasal cannula in place  HENT:      Head: Normocephalic and atraumatic  Mouth/Throat:      Mouth: Mucous membranes are dry  Eyes:      Extraocular Movements: Extraocular movements intact  Comments: Legally blind; macular degeneration   Cardiovascular:      Rate and Rhythm: Tachycardia present  Rhythm irregularly irregular  Pulses: Normal pulses  Heart sounds: No murmur heard  Comments: AF in 130s  Pulmonary:      Effort: Tachypnea present  Breath sounds: Decreased breath sounds and wheezing present  No rhonchi  Abdominal:      General: Bowel sounds are normal  There is no distension  Palpations: Abdomen is soft  Tenderness: There is no abdominal tenderness  There is no guarding  Musculoskeletal:      Cervical back: Normal range of motion and neck supple        Right lower le+ Pitting Edema present  Left lower le+ Pitting Edema present  Skin:     General: Skin is warm and dry  Capillary Refill: Capillary refill takes less than 2 seconds  Coloration: Skin is pale  Neurological:      General: No focal deficit present  Mental Status: She is alert and oriented to person, place, and time  Psychiatric:         Mood and Affect: Mood normal          Behavior: Behavior normal  Behavior is cooperative  Thought Content: Thought content normal          Judgment: Judgment normal        --------------------------------------------------------------------------------------------------------------  Vitals:   Vitals:    225 22 0000   BP:   152/72    BP Location:       Pulse: (!) 134  (!) 135 (!) 142   Resp: (!) 46  (!) 42 (!) 48   Temp: (!) 96 1 °F (35 6 °C)   (!) 97 1 °F (36 2 °C)   TempSrc: Tympanic   Temporal   SpO2: 94% 95% 94% 94%   Weight:         Temp  Min: 96 1 °F (35 6 °C)  Max: 98 9 °F (37 2 °C)        Body mass index is 25 kg/m²      Laboratory and Diagnostics:  Results from last 7 days   Lab Units 22  0749   WBC Thousand/uL 16 19*   HEMOGLOBIN g/dL 8 9*   HEMATOCRIT % 31 5*   PLATELETS Thousands/uL 313   BANDS PCT % 3   MONO PCT % 2*     Results from last 7 days   Lab Units 22  0749   SODIUM mmol/L 130*   POTASSIUM mmol/L 4 0   CHLORIDE mmol/L 87*   CO2 mmol/L 26   ANION GAP mmol/L 17*   BUN mg/dL 44*   CREATININE mg/dL 1 34*   CALCIUM mg/dL 10 3*   GLUCOSE RANDOM mg/dL 414*     Results from last 7 days   Lab Units 22  0749   MAGNESIUM mg/dL 2 2      Results from last 7 days   Lab Units 22  1830 22  0749   INR   --  1 15   PTT seconds 30 25          Results from last 7 days   Lab Units 22  2308 22  2111 22  1047 22  0823   LACTIC ACID mmol/L 10 4* 12 6* 6 6* 6 4*     ABG:    VBG:    Results from last 7 days   Lab Units 22  0835   PROCALCITONIN ng/ml 0 21 Micro:  Results from last 7 days   Lab Units 22  0835   BLOOD CULTURE  Received in Microbiology Lab  Culture in Progress  Received in Microbiology Lab  Culture in Progress  EKG: A-fib  Imaging: I have personally reviewed pertinent reports  and I have personally reviewed pertinent films in PACS      Historical Information   Past Medical History:   Diagnosis Date    Asthma     Chronic diastolic CHF (congestive heart failure) (HCC)     DJD (degenerative joint disease)     Paroxysmal atrial fibrillation (HCC)     Shortness of breath      Past Surgical History:   Procedure Laterality Date    BLADDER SURGERY      COLON SURGERY      COLONOSCOPY      COLONOSCOPY W/ POLYPECTOMY N/A 2019    Procedure: COLONOSCOPY W/ POLYPECTOMY;  Surgeon: Lucila Rose MD;  Location: [address] [place] [address] GI LAB; Service: General    EGD      FL GUIDED NEEDLE PLAC BX/ASP/INJ  2020    FL GUIDED NEEDLE PLAC BX/ASP/INJ  2021    GASTROJEJUNOSTOMY W/ JEJUNOSTOMY TUBE N/A 2/3/2020    Procedure: Antrectomy with bilroth II Anastomosis;   Surgeon: Ambreen Kothari MD;  Location:  MAIN OR;  Service: General   Zac Amass JOINT Right 2020    Procedure: BLOCK / INJECTION SACROILIAC;  Surgeon: Lottie Mao MD;  Location: [facility];  Service: Pain Management     MS INJECTION,SACROILIAC JOINT Right 2021    Procedure: RIGHT SACROILIAC JOINT INJECTION;  Surgeon: Lottie Mao MD;  Location: [facility];  Service: Pain Management     SMALL INTESTINE SURGERY  2020    with partial gastrectomy     Social History   Social History     Substance and Sexual Activity   Alcohol Use Never    Alcohol/week: 0 0 standard drinks     Social History     Substance and Sexual Activity   Drug Use Never     Social History     Tobacco Use   Smoking Status Former Smoker    Quit date: 2013    Years since quittin 1   Smokeless Tobacco Never Used     Exercise History: N/A  Family History:   Family History   Problem Relation Age of Onset    Heart disease Mother      I have reviewed this patient's family history and commented on sigificant items within the HPI      Medications:  Current Facility-Administered Medications   Medication Dose Route Frequency    acetaminophen (TYLENOL) tablet 650 mg  650 mg Oral Q6H PRN    aspirin (ECOTRIN LOW STRENGTH) EC tablet 81 mg  81 mg Oral Weekly    budesonide (PULMICORT) inhalation solution 0 5 mg  0 5 mg Nebulization Q12H    cefepime (MAXIPIME) IVPB (premix in dextrose) 1,000 mg 50 mL  1,000 mg Intravenous Q12H    cholecalciferol (VITAMIN D3) tablet 400 Units  400 Units Oral Daily    diltiazem (CARDIZEM) 125 mg in sodium chloride 0 9 % 125 mL infusion  1-15 mg/hr Intravenous Titrated    heparin (porcine) 25,000 units in 0 45% NaCl 250 mL infusion (premix)  3-20 Units/kg/hr (Order-Specific) Intravenous Titrated    heparin (porcine) injection 1,650 Units  1,650 Units Intravenous Q1H PRN    heparin (porcine) injection 3,300 Units  3,300 Units Intravenous Q1H PRN    insulin regular (HumuLIN R,NovoLIN R) 1 Units/mL in sodium chloride 0 9 % 100 mL infusion  0 3-21 Units/hr Intravenous Titrated    iohexol (OMNIPAQUE) 240 MG/ML solution 50 mL  50 mL Oral Once in imaging    ipratropium (ATROVENT) 0 02 % inhalation solution 0 5 mg  0 5 mg Nebulization Q6H    iron polysaccharides (FERREX) capsule 150 mg  150 mg Oral Daily    levalbuterol (XOPENEX) inhalation solution 1 25 mg  1 25 mg Nebulization Q6H    lidocaine (PF) (XYLOCAINE-MPF) 1 % injection **ADS Override Pull**        methylPREDNISolone sodium succinate (Solu-MEDROL) injection 40 mg  40 mg Intravenous Q8H CONG    metroNIDAZOLE (FLAGYL) tablet 500 mg  500 mg Oral Q8H [facility]    montelukast (SINGULAIR) tablet 10 mg  10 mg Oral HS    multi-electrolyte (ISOLYTE-S PH 7 4) bolus 500 mL  500 mL Intravenous Once    multi-electrolyte (PLASMALYTE-A/ISOLYTE-S PH 7 4) IV solution  75 mL/hr Intravenous Continuous    pantoprazole (PROTONIX) EC tablet 40 mg  40 mg Oral Early Morning    sodium chloride 0 9 % inhalation solution 3 mL  3 mL Nebulization Once    vancomycin (VANCOCIN) 1,250 mg in sodium chloride 0 9 % 250 mL IVPB  20 mg/kg Intravenous Once     Home medications:  Prior to Admission Medications   Prescriptions Last Dose Informant Patient Reported? Taking?    Artificial Tear Solution (GENTEAL TEARS) 0 1-0 2-0 3 % SOLN 2022 at Unknown time Self Yes Yes   Sig: Administer 1 drop to both eyes 3 (three) times a day   Calcium Carb-Cholecalciferol (CALCIUM 1000 + D PO) 2022 at Unknown time Self Yes Yes   Sig: Take 1,000 mg by mouth daily   Januvia 50 MG tablet 2022 at Unknown time  No Yes   Sig: take 1 tablet by mouth once daily   OXYGEN-HELIUM IN 2022 at Unknown time Self Yes Yes   Sig: Inhale   acetaminophen (TYLENOL) 325 mg tablet Unknown at Unknown time Self Yes No   Sig: Take 650 mg by mouth every 4 (four) hours as needed for mild pain   alendronate (FOSAMAX) 70 mg tablet   No No   Sig: take 1 tablet by mouth every 7 days   apixaban (ELIQUIS) 2 5 mg Past Week at Unknown time  No Yes   Sig: Take 1 tablet (2 5 mg total) by mouth in the morning On Monday, Tuesday, Thursday, Friday   aspirin (ECOTRIN LOW STRENGTH) 81 mg EC tablet 2022 at Unknown time  No Yes   Sig: Take 1 tablet (81 mg total) by mouth once a week Take on    budesonide-formoterol (Symbicort) 160-4 5 mcg/act inhaler 2022 at Unknown time Self Yes Yes   Sig: Inhale 2 puffs 2 (two) times a day   cholecalciferol (VITAMIN D3) 400 units tablet 2022 at Unknown time Self Yes Yes   Sig: Take 400 Units by mouth daily   diltiazem (CARDIZEM CD) 120 mg 24 hr capsule 2022 at Unknown time  No Yes   Sig: take 1 capsule by mouth once daily   fluticasone (FLONASE) 50 mcg/act nasal spray 2022 at Unknown time  No Yes   Si spray into each nostril daily   fluticasone-umeclidinium-vilanterol (Trelegy Ellipta) 100-62 5-25 MCG/INH inhaler   No No   Sig: Inhale 1 puff daily Rinse mouth after use  furosemide (LASIX) 20 mg tablet 1/16/2022 at Unknown time  No Yes   Sig: take 2 tablets by mouth twice a day   Patient taking differently: Pt takes 1 pill not 2   gabapentin (NEURONTIN) 300 mg capsule 1/16/2022 at Unknown time  No Yes   Sig: Take 1 capsule (300 mg total) by mouth 3 (three) times a day   hydrOXYzine HCL (ATARAX) 25 mg tablet Unknown at Unknown time  No No   Sig: TAKE 1 TABLET BY MOUTH EVERY 6 HOURS AS NEEDED FOR ANXIETY   ipratropium (Atrovent HFA) 17 mcg/act inhaler 1/16/2022 at Unknown time  No Yes   Sig: Inhale 2 puffs every 6 (six) hours   ipratropium-albuterol (DUO-NEB) 0 5-2 5 mg/3 mL nebulizer solution 1/16/2022 at Unknown time  No Yes   Sig: 3mg q 6 hours by nebulizer while awake     iron polysaccharides (Ferrex 150) 150 mg capsule 1/16/2022 at Unknown time  No Yes   Sig: Take 1 capsule (150 mg total) by mouth daily   lidocaine (LIDODERM) 5 %   No No   Sig: Apply 1 patch topically daily Remove & Discard patch within 12 hours or as directed by MD   magnesium oxide (MAG-OX) 400 mg Unknown at Unknown time  No No   Sig: take 1 tablet by mouth twice a day   metFORMIN (GLUCOPHAGE) 500 mg tablet 1/16/2022 at Unknown time  No Yes   Sig: take 1 tablet by mouth twice a day WITH A MEAL   metoprolol succinate (TOPROL-XL) 25 mg 24 hr tablet 1/16/2022 at Unknown time  No Yes   Sig: Take 3 tablets (75 mg total) by mouth every morning   montelukast (SINGULAIR) 10 mg tablet 1/15/2022 at Unknown time  No Yes   Sig: Take 1 tablet (10 mg total) by mouth daily at bedtime   omeprazole (PriLOSEC) 40 MG capsule 1/16/2022 at Unknown time  Yes Yes   Sig: Take 40 mg by mouth every morning    oxyCODONE-acetaminophen (PERCOCET) 5-325 mg per tablet 1/16/2022 at Unknown time  No Yes   Sig: Take 1 tablet by mouth every 6 (six) hours as needed for moderate painMax Daily Amount: 4 tablets   polyethylene glycol (MIRALAX) 17 g packet   No No   Sig: Take 17 g by mouth daily   predniSONE 20 mg tablet 1/15/2022 at Unknown time  No Yes   Sig: Take 1 tablet (20 mg total) by mouth daily   predniSONE 5 mg tablet 1/16/2022 at Unknown time  No Yes   Sig: take 1 tablet by mouth twice a day with meals   psyllium (METAMUCIL) packet Not Taking at Unknown time Self No No   Sig: Take 1 packet by mouth 3 (three) times a day   Patient not taking: Reported on 1/10/2022    simethicone (MYLICON) 80 mg chewable tablet Not Taking at Unknown time Self No No   Sig: Chew 1 tablet (80 mg total) every 6 (six) hours as needed for flatulence   Patient not taking: Reported on 1/16/2022    theophylline (CORTNEY-24) 200 MG 24 hr capsule 1/16/2022 at Unknown time  No Yes   Sig: Take 1 capsule (200 mg total) by mouth daily      Facility-Administered Medications: None     Allergies: Allergies   Allergen Reactions    Bee Venom Shortness Of Breath    Fluticasone      Per pt  error       ------------------------------------------------------------------------------------------------------------  Advance Directive and Living Will: Yes    Power of : Yes  POLST:    ------------------------------------------------------------------------------------------------------------  Anticipated Length of Stay is > 2 midnights    Care Time Delivered:   Upon my evaluation, this patient had a high probability of imminent or life-threatening deterioration due to sepsis, AF w/RVR, COPD exacerbation, DEBI, HAGMA, lactic acidosis, which required my direct attention, intervention, and personal management  I have personally provided 30 minutes (1230 to 1300) of critical care time, exclusive of procedures, teaching, family meetings, and any prior time recorded by providers other than myself  LEEROY Parnell        Portions of the record may have been created with voice recognition software    Occasional wrong word or "sound a like" substitutions may have occurred due to the inherent limitations of voice recognition software    Read the chart carefully and recognize, using context, where substitutions have occurred Nail was excised./Nail was repaired./Non-extensive debridement was performed./Undermining was performed.

## 2022-01-17 NOTE — CONSULTS
Consultation - [facility]) [facility]  Sander Sanchez 82 y o  female MRN: [id number]  Unit/Bed#: ICU 05-01 Encounter: [id number]        Inpatient consult to gastroenterology  Consult performed by: Rachel Loyd PA-C  Consult ordered by: Raúl Gupta          Reason for Consult / Principal Problem:     1  Transaminitis    ASSESSMENT AND PLAN:      1  Transaminitis  Patient is a 82 y o  female with PMH significant for diastolic CHF, paroxysmal AFib on Eliquis, HTN, COPD with home O2 2-3 L via NC, DM2, GERD, chronic BRUNILDA who initially presented on 01/16 with acute onset of SOB and sputum production x3 days  Patient had met sepsis criteria with CXR concerning for possible RLL infiltrate  Currently receiving vancomycin, cefepime, Flagyl  Hospital course complicated by AFib with RVR on heparin gtt and COPD exacerbation requiring 3-4 L O2 via NC  GI was consulted due to acutely and significantly elevated transaminitis (AST 2440, ALT 2925, alk-phos 129, T bili 1 41) noted on 01/17  Per ICU team, acute hep panel ordered and initiated on NAC  CTA/P on 01/17 with GI related results as follows: Abnormal appearance of the liver, possibly related to congestive hepatopathy; portions esophagus are dilated there is oral contrast material of the esophagus extending to the cervical esophagus suggesting marked gastroesophageal reflux; moderate amount of stool in the colon suggesting a degree of constipation; Prior gastrojejunostomy and postoperative changes rectosigmoid  Follow-up right upper quadrant ultrasound pending  Likely shock liver vs  ischemic hepatitis given rapidly elevated transaminitis  Differential also includes acute hepatitis infection, less likely drug induced liver injury    - Continue supportive measures  - Continue daily Protonix   - Trend hepatic function daily  - Avoid hepatotoxic agents  - Review acute hepatitis and iron panel once resulted  - Determine the need for further hepatic workup after reviewing acute hepatitis panel, iron panel, and repeat hepatic function    GI will continue to follow  ______________________________________________________________________    HPI: Patient is a 82 y o  female with PMH significant for diastolic CHF, paroxysmal AFib on Eliquis, HTN, COPD with home O2 2-3 L via NC, DM 2, GERD, chronic BRUNILDA who initially presented on 01/16 with acute onset of SOB and sputum production x3 days  Patient met sepsis criteria with CXR concerning for possible right lower lobe infiltrate - currently receiving vancomycin, cefepime, and Flagyl  Patient found to be in AFib with RVR (160 ppm) requiring Cardizem and heparin gtt, transitioned to home p o  Cardizem  Patient also found to be in acute COPD exacerbation, receiving steroids to Q 12 and Atrovent/Xopenex/Pulmicort on 3-4 L via NC     GI was consulted due to acutely and significantly elevated transaminitis (AST 2440, ALT 2925, alk-phos 129, T bili 1 41) noted on 01/17  Per ICU team, acute hep panel ordered and initiated on NAC  Patient is unable to provide additional information for HPI  When asked specifically about her abdominal pain she is unable to localize or describe it  She continues to ignore questions and state that she wants to leave today  Denies any other GI-related concerns  REVIEW OF SYSTEMS:    CONSTITUTIONAL: Denies any fever, chills, rigors, and weight loss  HEENT: No earache or tinnitus  Denies hearing loss or visual disturbances  CARDIOVASCULAR: No chest pain or palpitations  RESPIRATORY: Denies any cough, hemoptysis, shortness of breath or dyspnea on exertion  GASTROINTESTINAL: As noted in the History of Present Illness  GENITOURINARY: No problems with urination  Denies any hematuria or dysuria  NEUROLOGIC: No dizziness or vertigo, denies headaches  MUSCULOSKELETAL: Denies any muscle or joint pain  SKIN: Denies skin rashes or itching     ENDOCRINE: Denies excessive thirst  Denies intolerance to heat or cold  PSYCHOSOCIAL: Denies depression or anxiety  Denies any recent memory loss  Historical Information   Past Medical History:   Diagnosis Date    Asthma     Chronic diastolic CHF (congestive heart failure) (HCC)     DJD (degenerative joint disease)     Paroxysmal atrial fibrillation (HCC)     Shortness of breath      Past Surgical History:   Procedure Laterality Date    BLADDER SURGERY      COLON SURGERY      COLONOSCOPY      COLONOSCOPY W/ POLYPECTOMY N/A 2019    Procedure: COLONOSCOPY W/ POLYPECTOMY;  Surgeon: Jhoana Ibarra MD;  Location: [address] [place] [address] GI LAB; Service: General    EGD      FL GUIDED NEEDLE PLAC BX/ASP/INJ  2020    FL GUIDED NEEDLE PLAC BX/ASP/INJ  2021    GASTROJEJUNOSTOMY W/ JEJUNOSTOMY TUBE N/A 2/3/2020    Procedure: Antrectomy with bilroth II Anastomosis;   Surgeon: Natalee Dillon MD;  [facility];  Service: General   Gerry Dubonnet JOINT Right 2020    Procedure: BLOCK / INJECTION SACROILIAC;  Surgeon: Gabbi Austin MD;  Location: [facility];  Service: Pain Management     IL INJECTION,SACROILIAC JOINT Right 2021    Procedure: RIGHT SACROILIAC JOINT INJECTION;  Surgeon: Gabbi Austin MD;  Location: [facility];  Service: Pain Management     SMALL INTESTINE SURGERY  2020    with partial gastrectomy     Social History   Social History     Substance and Sexual Activity   Alcohol Use Never    Alcohol/week: 0 0 standard drinks     Social History     Substance and Sexual Activity   Drug Use Never     Social History     Tobacco Use   Smoking Status Former Smoker    Quit date: 2013    Years since quittin 1   Smokeless Tobacco Never Used     Family History   Problem Relation Age of Onset    Heart disease Mother        Meds/Allergies     Medications Prior to Admission   Medication    apixaban (ELIQUIS) 2 5 mg    Artificial Tear Solution (GENTEAL TEARS) 0 1-0 2-0 3 % SOLN    aspirin (ECOTRIN LOW STRENGTH) 81 mg EC tablet    budesonide-formoterol (Symbicort) 160-4 5 mcg/act inhaler    Calcium Carb-Cholecalciferol (CALCIUM 1000 + D PO)    cholecalciferol (VITAMIN D3) 400 units tablet    diltiazem (CARDIZEM CD) 120 mg 24 hr capsule    fluticasone (FLONASE) 50 mcg/act nasal spray    furosemide (LASIX) 20 mg tablet    gabapentin (NEURONTIN) 300 mg capsule    ipratropium (Atrovent HFA) 17 mcg/act inhaler    ipratropium-albuterol (DUO-NEB) 0 5-2 5 mg/3 mL nebulizer solution    iron polysaccharides (Ferrex 150) 150 mg capsule    Januvia 50 MG tablet    metFORMIN (GLUCOPHAGE) 500 mg tablet    metoprolol succinate (TOPROL-XL) 25 mg 24 hr tablet    montelukast (SINGULAIR) 10 mg tablet    omeprazole (PriLOSEC) 40 MG capsule    oxyCODONE-acetaminophen (PERCOCET) 5-325 mg per tablet    OXYGEN-HELIUM IN    predniSONE 20 mg tablet    predniSONE 5 mg tablet    theophylline (CORTNEY-24) 200 MG 24 hr capsule    acetaminophen (TYLENOL) 325 mg tablet    alendronate (FOSAMAX) 70 mg tablet    fluticasone-umeclidinium-vilanterol (Trelegy Ellipta) 100-62 5-25 MCG/INH inhaler    hydrOXYzine HCL (ATARAX) 25 mg tablet    lidocaine (LIDODERM) 5 %    magnesium oxide (MAG-OX) 400 mg    polyethylene glycol (MIRALAX) 17 g packet    psyllium (METAMUCIL) packet    simethicone (MYLICON) 80 mg chewable tablet     Current Facility-Administered Medications   Medication Dose Route Frequency    acetaminophen (TYLENOL) tablet 650 mg  650 mg Oral Q6H PRN    acetylcysteine (ACETADOTE) 8,880 mg in dextrose 5 % 200 mL IVPB  150 mg/kg Intravenous Once    Followed by   [place] acetylcysteine (ACETADOTE) 2,960 mg in dextrose 5 % 500 mL IVPB  50 mg/kg Intravenous Once    Followed by   [place]all acetylcysteine (ACETADOTE) 5,920 mg in dextrose 5 % 1,000 mL IVPB  100 mg/kg Intravenous Once    aspirin (ECOTRIN LOW STRENGTH) EC tablet 81 mg  81 mg Oral Weekly    bisacodyl (DULCOLAX) rectal suppository 10 mg  10 mg Rectal Daily PRN    budesonide (PULMICORT) inhalation solution 0 5 mg  0 5 mg Nebulization Q12H    cefepime (MAXIPIME) IVPB (premix in dextrose) 1,000 mg 50 mL  1,000 mg Intravenous Q12H    cholecalciferol (VITAMIN D3) tablet 400 Units  400 Units Oral Daily    diltiazem (CARDIZEM) 125 mg in sodium chloride 0 9 % 125 mL infusion  1-15 mg/hr Intravenous Titrated    heparin (porcine) 25,000 units in 0 45% NaCl 250 mL infusion (premix)  3-20 Units/kg/hr (Order-Specific) Intravenous Titrated    heparin (porcine) injection 1,650 Units  1,650 Units Intravenous Q1H PRN    heparin (porcine) injection 3,300 Units  3,300 Units Intravenous Q1H PRN    insulin regular (HumuLIN R,NovoLIN R) 1 Units/mL in sodium chloride 0 9 % 100 mL infusion  0 3-21 Units/hr Intravenous Titrated    ipratropium (ATROVENT) 0 02 % inhalation solution 0 5 mg  0 5 mg Nebulization Q6H    iron polysaccharides (FERREX) capsule 150 mg  150 mg Oral Daily    levalbuterol (XOPENEX) inhalation solution 1 25 mg  1 25 mg Nebulization Q6H    methylPREDNISolone sodium succinate (Solu-MEDROL) injection 40 mg  40 mg Intravenous Q12H CONG    metroNIDAZOLE (FLAGYL) tablet 500 mg  500 mg Oral Q8H [address]    montelukast (SINGULAIR) tablet 10 mg  10 mg Oral HS    ondansetron (ZOFRAN) injection 4 mg  4 mg Intravenous Q6H PRN    pantoprazole (PROTONIX) EC tablet 40 mg  40 mg Oral Early Morning    polyethylene glycol (MIRALAX) packet 17 g  17 g Oral Daily    senna-docusate sodium (SENOKOT S) 8 6-50 mg per tablet 2 tablet  2 tablet Oral BID    sodium chloride 0 9 % inhalation solution 3 mL  3 mL Nebulization Once    vancomycin (VANCOCIN) IVPB (premix in dextrose) 750 mg 150 mL  12 5 mg/kg Intravenous Q24H       Allergies   Allergen Reactions    Bee Venom Shortness Of Breath    Fluticasone      Per pt  error           Objective     Blood pressure 99/55, pulse 89, temperature 98 6 °F (37 °C), resp   rate (!) 25, weight 59 2 kg (130 lb 8 2 oz), SpO2 92 %, not currently breastfeeding  Body mass index is 25 49 kg/m²  Intake/Output Summary (Last 24 hours) at 1/17/2022 1323  Last data filed at 1/17/2022 0800  Gross per 24 hour   Intake 1713 8 ml   Output 600 ml   Net 1113 8 ml         PHYSICAL EXAM:      General Appearance:   Uncooperative with questions; Alert, no distress   HEENT:   Normocephalic, atraumatic, anicteric  Neck:  Supple, symmetrical, trachea midline   Lungs:   Decreased breath sounds b/l; respirations unlabored    Heart[de-identified]   Regular rate and rhythm; no murmur, rub, or gallop  Abdomen:   Soft, mild-moderate diffuse abd TTP, non-distended; normal bowel sounds; no masses, no organomegaly    Genitalia:   Deferred    Rectal:   Deferred    Extremities:  No cyanosis, clubbing    Pulses:  2+ and symmetric all extremities    Skin:  No jaundice, rashes, or lesions    Lymph nodes:  No palpable cervical lymphadenopathy        Lab Results:   Admission on 01/16/2022   Component Date Value    WBC 01/16/2022 16 19*    RBC 01/16/2022 4 71     Hemoglobin 01/16/2022 8 9*    Hematocrit 01/16/2022 31 5*    MCV 01/16/2022 67*    MCH 01/16/2022 18 9*    MCHC 01/16/2022 28 3*    RDW 01/16/2022 20 9*    MPV 01/16/2022 9 7     Platelets 39/82/6617 313     Protime 01/16/2022 14 6*    INR 01/16/2022 1 15     PTT 01/16/2022 25     SARS-CoV-2 01/16/2022 Negative     INFLUENZA A PCR 01/16/2022 Negative     INFLUENZA B PCR 01/16/2022 Negative     RSV PCR 01/16/2022 Negative     hs TnI 0hr 01/16/2022 32     Sodium 01/16/2022 130*    Potassium 01/16/2022 4 0     Chloride 01/16/2022 87*    CO2 01/16/2022 26     ANION GAP 01/16/2022 17*    BUN 01/16/2022 44*    Creatinine 01/16/2022 1 34*    Glucose 01/16/2022 414*    Calcium 01/16/2022 10 3*    eGFR 01/16/2022 36     Magnesium 01/16/2022 2 2     LACTIC ACID 01/16/2022 6 4*    Procalcitonin 01/16/2022 0 21     Blood Culture 01/16/2022 Received in Microbiology Lab  Culture in Progress       Blood Culture 01/16/2022 Received in Microbiology Lab  Culture in Progress       Color, UA 01/16/2022 Yellow     Clarity, UA 01/16/2022 Cloudy*    Specific Gravity, UA 01/16/2022 1 025     pH, UA 01/16/2022 6 0     Leukocytes, UA 01/16/2022 1+*    Nitrite, UA 01/16/2022 Negative     Protein, UA 01/16/2022 Negative     Glucose, UA 01/16/2022 3+*    Ketones, UA 01/16/2022 Trace*    Urobilinogen, UA 01/16/2022 0 2     Bilirubin, UA 01/16/2022 Negative     Blood, UA 01/16/2022 Trace-Intact*    BNP 01/16/2022 742*    hs TnI 4hr 01/16/2022 30     Delta 4hr hsTnI 01/16/2022 -2     LACTIC ACID 01/16/2022 6 6*    Procalcitonin 01/17/2022 1 94*    Segmented % 01/16/2022 91*    Bands % 01/16/2022 3     Lymphocytes % 01/16/2022 4*    Monocytes % 01/16/2022 2*    Eosinophils, % 01/16/2022 0     Basophils % 01/16/2022 0     Absolute Neutrophils 01/16/2022 15 22*    Lymphocytes Absolute 01/16/2022 0 65     Monocytes Absolute 01/16/2022 0 32     Eosinophils Absolute 01/16/2022 0 00     Basophils Absolute 01/16/2022 0 00     RBC Morphology 01/16/2022 Present     Anisocytosis 01/16/2022 Present     Hypochromia 01/16/2022 Present     Ovalocytes 01/16/2022 Present     Poikilocytes 01/16/2022 Present     Polychromasia 01/16/2022 Present     Platelet Estimate 05/44/2891 Adequate     Hemoglobin A1C 01/16/2022 9 2*    EAG 01/16/2022 217     POC Glucose 01/16/2022 466*    RBC, UA 01/16/2022 4-10*    WBC, UA 01/16/2022 Innumerable*    Epithelial Cells 01/16/2022 Moderate*    Bacteria, UA 01/16/2022 Moderate*    Hyaline Casts, UA 01/16/2022 1-2*    MUCUS THREADS 01/16/2022 Occasional*    PTT 01/16/2022 30     POC Glucose 01/16/2022 419*    POC Glucose 01/16/2022 354*    PTT 01/17/2022 77*    POC Glucose 01/16/2022 305*    LACTIC ACID 01/16/2022 12 6*    LACTIC ACID 01/16/2022 10 4*    POC Glucose 01/16/2022 205*    Acetaminophen Level 12/23/6479 <70*    Salicylate Lvl 55/70/6698 <5     BETA-HYDROXYBUTYRATE 01/17/2022 0 3     Strep pneumoniae antigen* 01/16/2022 Negative     LACTIC ACID 01/17/2022 7 6*    POC Glucose 01/17/2022 89     POC Glucose 01/17/2022 124     LACTIC ACID 01/17/2022 8 2*    PTT 01/17/2022 80*    Sodium 01/17/2022 130*    Potassium 01/17/2022 4 7     Chloride 01/17/2022 91*    CO2 01/17/2022 22     ANION GAP 01/17/2022 17*    BUN 01/17/2022 50*    Creatinine 01/17/2022 1 48*    Glucose 01/17/2022 114     Calcium 01/17/2022 7 5*    Corrected Calcium 01/17/2022 8 2*    AST 01/17/2022 2,440*    ALT 01/17/2022 2,925*    Alkaline Phosphatase 01/17/2022 129*    Total Protein 01/17/2022 5 0*    Albumin 01/17/2022 3 1*    Total Bilirubin 01/17/2022 1 41*    eGFR 01/17/2022 32     WBC 01/17/2022 23 43*    RBC 01/17/2022 4 06     Hemoglobin 01/17/2022 7 5*    Hematocrit 01/17/2022 27 7*    MCV 01/17/2022 68*    MCH 01/17/2022 18 5*    MCHC 01/17/2022 27 1*    RDW 01/17/2022 20 4*    Platelets 02/51/6884 216     MPV 01/17/2022 10 5     Magnesium 01/17/2022 2 7     Phosphorus 01/17/2022 5 0*    POC Glucose 01/17/2022 112     POC Glucose 01/17/2022 130     Ventricular Rate 01/16/2022 165     Atrial Rate 01/16/2022 163     QRSD Interval 01/16/2022 88     QT Interval 01/16/2022 276     QTC Interval 01/16/2022 457     QRS Axis 01/16/2022 -19     T Wave Axis 01/16/2022 160     LACTIC ACID 01/17/2022 9 4*    POC Glucose 01/17/2022 105     LACTIC ACID 01/17/2022 4 9*    POC Glucose 01/17/2022 112     POC Glucose 01/17/2022 115     POC Glucose 01/17/2022 114        Imaging Studies: I have personally reviewed pertinent imaging studies

## 2022-01-17 NOTE — ASSESSMENT & PLAN NOTE
· Presented in AF w/RVR in 160s  · Suspect this is in setting of sepsis vs COPD exacerbation  · Hx of pAF - on Cardizem 120 mg daily, Toprol XL 75 mg daily; AC w/Eliquis  · Received IV Cardizem push 10 mg x1, followed by 20 mg x1 and subsequently started on Cardizem gtt in ED  · Cardizem gtt currently at 15  · Received 5mg lopressor x 2 with improvement in HR  · Troponin 32 - 30  · Will start Heparin gtt now  · Titrate Cardizem gtt for goal HR < 120  · Consider Amiodarone gtt if unable to obtain rate control   · Optimize electrolytes  · Trend troponin  · Continue telemetry monitoring

## 2022-01-17 NOTE — QUICK NOTE
Spoke with the patient's medical power of , Bita Mosher  She was updated on the patient's current condition and plan of care  All her questions were answered

## 2022-01-17 NOTE — ASSESSMENT & PLAN NOTE
· Presented with 3 days of SOB, sputum production  · AEB AF w/RVR in 160s, tachypnea, leukocytosis, elevated lactic acid, DEBI  · CXR reveals possible RLL infiltrate  · COVID/FLU/RSV negative  · Blood cultures pending  · Will order UA, urine strep/legionella  · Will order sputum culture  · Initial lactic acid 6 4  · Check procalcitonin  · Currently receiving 500 ml bolus  · Continue to re-evaluate fluid status - will likely give additional IVF resuscitation in setting of elevated lactic/presumed sepsis/dehydration, however will use caution given heart failure history  · Started on Ceftriaxone/Azithromycin in ED - will continue   · Trend lactic acid  · Monitor fever and WBC curve  · Follow-up cultures

## 2022-01-17 NOTE — NURSING NOTE
To CT via bed on O2 and monitor  Tolerated fair  C/o increased back pain with transport and test   Now c/o increased abdominal pain  AP made aware

## 2022-01-17 NOTE — ASSESSMENT & PLAN NOTE
· Creatinine 1 34 on admission  · Baseline appears to be around 0 5 - 0 7  · Suspect this is in setting of decreased PO intake, diuretic use, theophylline use, sepsis  · Takes Lasix at home - will hold at this time  · Continue IVF resuscitation in setting of sepsis  · Avoid nephrotoxic agents and hypotension  · Trend renal indices  · Strict I/O  · Daily weights

## 2022-01-17 NOTE — ASSESSMENT & PLAN NOTE
· AG 17 with normal bicarb on admission  · Suspect this is 2/2 lactic acidosis vs DEBI with uremia as well as theophylline use  · Check BHB to rule out DKA  · Check APAP, ASA levels  · Avoid theophylline at this time  · Trend AG, lactic acid

## 2022-01-17 NOTE — PROGRESS NOTES
[facility] 47  Progress Note - Denisha Lewis 1935, 82 y o  female MRN: [id number]  Unit/Bed#: ICU 05-01 Encounter: [id number]  Primary Care Provider: LEEROY Hutchinson   Date and time admitted to hospital: 1/16/2022  7:23 AM    * Sepsis ([address] )  Assessment & Plan  · Presented with 3 days of SOB, sputum production  · AEB AF w/ RVR in 160s, tachypnea, leukocytosis, elevated lactic acid, DEBI  · CXR reveals possible RLL infiltrate  · COVID/FLU/RSV negative  · Blood cultures pending  · Will order UA, urine strep/legionella  · Will order sputum culture  · Initial lactic acid 6 4 - repeat 10 4  · CT C/A/P pending  · Check procalcitonin  · Started on Ceftriaxone/Azithromycin in ED, escalated to cefepime/vanc/flagyl  · Trend lactic acid  · Monitor fever and WBC curve  · Follow-up cultures    Atrial fibrillation with RVR (HCC)  Assessment & Plan  · Presented in AF w/RVR in 160s  · Suspect this is in setting of sepsis vs COPD exacerbation  · Hx of pAF - on Cardizem 120 mg daily, Toprol XL 75 mg daily; AC w/Eliquis  · Received IV Cardizem push 10 mg x1, followed by 20 mg x1 and subsequently started on Cardizem gtt in ED  · Cardizem gtt currently at 15  · Received 5mg lopressor x 2 with improvement in HR  · Troponin 32 - 30  · Will start Heparin gtt now  · Titrate Cardizem gtt for goal HR < 120  · Consider Amiodarone gtt if unable to obtain rate control   · Optimize electrolytes  · Trend troponin  · Continue telemetry monitoring    COPD with acute exacerbation (HCC)  Assessment & Plan  · Presents with 3 days history of SOB and audible wheezing  · Typically wears 2-3 L NC at baseline - currently requiring 3-4 L NC  · Takes Singulair and mx inhalers including Symbicort, Trelegy Ellipta, Atrovent  · Recently was restarted on Theophylline and placed on Prednisone by PCP  · Received Solu Medrol 60 mg x1 in ED - given additional 65 mg to equal 125 mg   · Scheduled Solu Medrol 40 mg Q8hrs  · Will hold home inhalers and order Xopenex/Atrovent/Pulmicort nebs  · Hold Theophylline for now  · Continue Singulair  · Wean O2 for SpO2 > 88%    High anion gap metabolic acidosis  Assessment & Plan  · AG 17 with normal bicarb on admission  · Suspect this is 2/2 lactic acidosis vs DEBI with uremia as well as theophylline use  · Beta hydroxybutyrate normal  · APAP and ASA levels normal  · Avoid theophylline at this time  · Trend AG, lactic acid    Lactic acidosis  Assessment & Plan  · Lactic acid 6 4 on admission, increased to 10 4  · CT C/A/P pending    DEBI (acute kidney injury) ([facility] [address] )  Assessment & Plan  · Creatinine 1 34 on admission  · Baseline appears to be around 0 5 - 0 7  · Suspect this is in setting of decreased PO intake, diuretic use, theophylline use, sepsis  · Takes Lasix at home - will hold at this time  · Continue IVF resuscitation in setting of sepsis  · Avoid nephrotoxic agents and hypotension  · Trend renal indices  · Strict I/O  · Daily weights    Type 2 diabetes mellitus without complication, without long-term current use of insulin [facility])  Assessment & Plan  Lab Results   Component Value Date    HGBA1C 6 7 (H) 11/01/2021       Recent Labs     01/16/22  1840 01/16/22  2032 01/16/22  2247 01/17/22  0057   POCGLU 354* 305* 205* 89       Blood Sugar Average: Last 72 hrs:  (P) 917 [id number]     · Takes Metformin and Januvia at home - hold for now  · Insulin gtt  · Fingersticks Q2hrs    Essential hypertension  Assessment & Plan  · Takes Toprol XL, Cardizem, Lasix at home - will hold for now  · See plan above    Iron deficiency anemia  Assessment & Plan  · Hgb 8 9 on admission  · Baseline appears to be around 8-10  · Continue home iron supplementation  · Trend hgb  · Monitor for bleeding    Chronic diastolic CHF (congestive heart failure) ([facility])  Assessment & Plan  Wt Readings from Last 3 Encounters:   01/16/22 58 1 kg (128 lb)   01/10/22 58 1 kg (128 lb)   11/01/21 59 5 kg (131 lb 3 2 oz)     · Echo in 2021 reveals EF 60% with mild concentric hypertrophy and G1DD; dilated LA; mild to moderate regurgitation  · Appears hypovolemic on exam  · Takes Lasix at home - will hold for now  · Currently receiving IVF resuscitation - will use caution given HF history  · Started on isolyte at 75 cc/hr for given DEBI and now s/p CT w/ contrast  · Strict I/O  · Daily weights        Chronic respiratory failure (HCC)  Assessment & Plan  · Wears 2-3L NC at baseline  · Currently requiring 3-4L NC - diffuse wheezing noted on exam  · Continue plan as noted above and wean O2 for goal SpO2 > 88%    Gastroesophageal reflux disease without esophagitis  Assessment & Plan  · Continue home PPI    ----------------------------------------------------------------------------------------  HPI/24hr events: Lactic acid increased to 12 6 though drawn with a tourniquet, repeat 10 4  CT C/A/P pending official read  R IJ CVC placed for access  Cardizem gtt remains at 10, intermittent bursts of AFib w/ RVR with rates into the 140s  Improved s/p 5mg IV lopressor       Patient appropriate for transfer out of the ICU today?: No  Disposition: Continue Stepdown Level 1 level of care   Code Status: Level 1 - Full Code  ---------------------------------------------------------------------------------------  SUBJECTIVE  "My back hurts"    Review of Systems  Review of systems was reviewed and negative unless stated above in HPI/24-hour events   ---------------------------------------------------------------------------------------  OBJECTIVE    Vitals   Vitals:    22 0245 22 0300 22 0315 22 0400   BP: (!) 81/60 (!) 75/58 (!) 74/55    Pulse: 89 (!) 117 87    Resp: (!) 26 (!) 29 (!) 36    Temp:    (!) 97 4 °F (36 3 °C)   TempSrc:    Temporal   SpO2: 95% 96% 90%    Weight:         Temp (24hrs), Av 4 °F (36 3 °C), Min:96 1 °F (35 6 °C), Max:98 9 °F (37 2 °C)  Current: Temperature: (!) 97 4 °F (36 3 °C)          Respiratory:  SpO2: SpO2: 90 %  Nasal Cannula O2 Flow Rate (L/min): 4 L/min    Invasive/non-invasive ventilation settings   Respiratory  Report   Lab Data (Last 4 hours)    None         O2/Vent Data (Last 4 hours)    None                Physical Exam  Vitals and nursing note reviewed  Constitutional:       General: She is in acute distress  Appearance: She is normal weight  She is not ill-appearing, toxic-appearing or diaphoretic  Interventions: Nasal cannula in place  HENT:      Head: Normocephalic and atraumatic  Eyes:      Pupils: Pupils are equal, round, and reactive to light  Cardiovascular:      Rate and Rhythm: Tachycardia present  Rhythm irregularly irregular  Heart sounds: Normal heart sounds  Heart sounds not distant  No murmur heard  No friction rub  No gallop  Pulmonary:      Effort: Tachypnea and prolonged expiration present  Breath sounds: Decreased breath sounds, wheezing and rales present  Abdominal:      General: There is distension  Palpations: Abdomen is soft  Tenderness: There is generalized abdominal tenderness and tenderness in the epigastric area  Musculoskeletal:      Right lower leg: Edema present  Left lower leg: Edema present  Skin:     General: Skin is warm and dry  Neurological:      General: No focal deficit present  Mental Status: She is alert and oriented to person, place, and time  GCS: GCS eye subscore is 4  GCS verbal subscore is 5  GCS motor subscore is 6  Psychiatric:         Behavior: Behavior is cooperative               Laboratory and Diagnostics:  Results from last 7 days   Lab Units 01/16/22  0749   WBC Thousand/uL 16 19*   HEMOGLOBIN g/dL 8 9*   HEMATOCRIT % 31 5*   PLATELETS Thousands/uL 313   BANDS PCT % 3   MONO PCT % 2*     Results from last 7 days   Lab Units 01/16/22  0749   SODIUM mmol/L 130*   POTASSIUM mmol/L 4 0   CHLORIDE mmol/L 87*   CO2 mmol/L 26   ANION GAP mmol/L 17*   BUN mg/dL 44*   CREATININE mg/dL 1 34*   CALCIUM mg/dL 10 3*   GLUCOSE RANDOM mg/dL 414*     Results from last 7 days   Lab Units 01/16/22  0749   MAGNESIUM mg/dL 2 2      Results from last 7 days   Lab Units 01/17/22  0105 01/16/22  1830 01/16/22  0749   INR   --   --  1 15   PTT seconds 77* 30 25          Results from last 7 days   Lab Units 01/17/22  0232 01/16/22  2308 01/16/22  2111 01/16/22  1047 01/16/22  0823   LACTIC ACID mmol/L 7 6* 10 4* 12 6* 6 6* 6 4*     ABG:    VBG:    Results from last 7 days   Lab Units 01/16/22  0835   PROCALCITONIN ng/ml 0 21       Micro  Results from last 7 days   Lab Units 01/16/22  0835   BLOOD CULTURE  Received in Microbiology Lab  Culture in Progress  Received in Microbiology Lab  Culture in Progress  Imaging: CT C/A/P pending I have personally reviewed pertinent reports  Intake and Output  I/O       01/15 0701  01/16 0700 01/16 0701  01/17 0700    Urine (mL/kg/hr)  100    Total Output  100    Net  -100                Height and Weights         Body mass index is 25 kg/m²  Weight (last 2 days)     Date/Time Weight    01/16/22 0740 58 1 (128)            Nutrition       Diet Orders   (From admission, onward)             Start     Ordered    01/17/22 0004  Diet NPO  Diet effective now        References:    Nutrtion Support Algorithm Enteral vs  Parenteral   Question Answer Comment   Diet Type NPO    RD to adjust diet per protocol?  No        01/17/22 0004                  Active Medications  Scheduled Meds:  Current Facility-Administered Medications   Medication Dose Route Frequency Provider Last Rate    acetaminophen  650 mg Oral Q6H PRN Linda Carvajal PA-C      aspirin  81 mg Oral Weekly LEEROY Daniel      bisacodyl  10 mg Rectal Daily PRN Linda Carvajal PA-C      budesonide  0 5 mg Nebulization Q12H LEEROY Daniel      cefepime  1,000 mg Intravenous Q12H Linda Carvajal PA-C 1,000 mg (01/17/22 0117)    cholecalciferol  400 Units Oral Daily LEEROY Fitzpatrick      diltiazem  1-15 mg/hr Intravenous Titrated LEEROY Daniel 10 mg/hr (01/17/22 0459)    heparin (porcine)  3-20 Units/kg/hr (Order-Specific) Intravenous Titrated Colen Spruce, CRNP 16 Units/kg/hr (01/16/22 1928)    heparin (porcine)  1,650 Units Intravenous Q1H PRN Leila Nice, MARILEENP      heparin (porcine)  3,300 Units Intravenous Q1H PRN Armando Hernandesuce, CRNP      insulin regular (HumuLIN R,NovoLIN R) infusion  0 3-21 Units/hr Intravenous Titrated Leila Nice, MARILEENP 1 Units/hr (01/17/22 0058)    ipratropium  0 5 mg Nebulization Q6H LEEROY Daniel      iron polysaccharides  150 mg Oral Daily LEEROY Daniel      levalbuterol  1 25 mg Nebulization Q6H LEEROY Daniel      lidocaine (PF)           methylPREDNISolone sodium succinate  40 mg Intravenous Q8H [address] LEEROY Daniel      metroNIDAZOLE  500 mg Oral Q8H [address] Birtha Skiff, PA-C      montelukast  10 mg Oral HS Armando Hernandesuce, CRNP      multi-electrolyte  75 mL/hr Intravenous Continuous Birtha Skiff, PA-C 75 mL/hr (01/17/22 0430)    ondansetron  4 mg Intravenous Q6H PRN Birtha Skiff, PA-C      pantoprazole  40 mg Oral Early Morning LEEROY Daniel      polyethylene glycol  17 g Oral Daily Birtha Skiff, PA-C      senna-docusate sodium  2 tablet Oral BID Birtha Skiff, PA-EDDIE      sodium chloride  3 mL Nebulization Once Colen Spruce, CRNP       Continuous Infusions:  diltiazem, 1-15 mg/hr, Last Rate: 10 mg/hr (01/17/22 0459)  heparin (porcine), 3-20 Units/kg/hr (Order-Specific), Last Rate: 16 Units/kg/hr (01/16/22 1928)  insulin regular (HumuLIN R,NovoLIN R) infusion, 0 3-21 Units/hr, Last Rate: 1 Units/hr (01/17/22 0058)  multi-electrolyte, 75 mL/hr, Last Rate: 75 mL/hr (01/17/22 0430)      PRN Meds:   acetaminophen, 650 mg, Q6H PRN  bisacodyl, 10 mg, Daily PRN  heparin (porcine), 1,650 Units, Q1H PRN  heparin (porcine), 3,300 Units, Q1H PRN  ondansetron, 4 mg, Q6H PRN        Invasive Devices Review  Invasive Devices  Report    Central Venous Catheter Line            CVC Central Lines 01/17/22 Triple 16cm <1 day          Peripheral Intravenous Line            Peripheral IV 01/16/22 Left Hand <1 day    Peripheral IV 01/16/22 Right Antecubital <1 day    Peripheral IV 01/16/22 Right;Dorsal (posterior) Hand <1 day          Drain            Urethral Catheter Temperature probe 16 Fr  <1 day                ---------------------------------------------------------------------------------------  Advance Directive and Living Will: Yes    Power of : Yes  POLST:    ---------------------------------------------------------------------------------------  Care Time Delivered:   No Critical Care time spent       Christiano Desai PA-C

## 2022-01-17 NOTE — NURSING NOTE
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  C/o severe back and chest pain 'from breathing too hard'  Appears short of breath but talking incessantly even while alone  Moves fairly well in bed with generalized weakness  Heart tones muffled with weak thready pulses  Color is pale with mottling to bilateral shoulders and upper arms and bilateral lower extremities  Extremities and skin are cool to touch  Bilateral feet are cyanotic and appear shriveled  +2 to +3 pitting edema to bilateral forearms and both lower extremities form knees to ankles  Ecchymosis to bilateral arms  Lungs are diminished throughout  Respirations are labored  Tolerating O2 at 4 liters  Abdomen is large and soft  Bowel sounds are hypoactive  Denies constipation  Voiding clear corey to purewick external urinary device  IV sites intact  Skin tear to right calf is open to air with dry scab intact

## 2022-01-17 NOTE — ASSESSMENT & PLAN NOTE
· Presented in AF w/RVR in 160s  · Suspect this is in setting of sepsis vs COPD exacerbation  · Hx of pAF - on Cardizem 120 mg daily, Toprol XL 75 mg daily; AC w/Eliquis  · Received IV Cardizem push 10 mg x1, followed by 20 mg x1 and subsequently started on Cardizem gtt in ED  · Troponin 32 - 30  · Will start Heparin gtt now  · Titrate Cardizem gtt for goal HR < 120  · Will hold off on beta blocker at this time given diffuse wheezing on exam  · Consider Amiodarone gtt if unable to obtain rate control and/or beta blocker if wheezing improves  · Optimize electrolytes  · Trend troponin  · Continue telemetry monitoring

## 2022-01-17 NOTE — ASSESSMENT & PLAN NOTE
Lab Results   Component Value Date    HGBA1C 6 7 (H) 11/01/2021       Recent Labs     01/16/22  1608 01/16/22  1840 01/16/22 2032 01/16/22 2247   POCGLU 419* 354* 305* 205*       Blood Sugar Average: Last 72 hrs:  (P) 349 8     · Takes Metformin and Januvia at home - hold for now  · Will order insulin gtt  · Fingersticks Q2hrs  · Diabetic diet

## 2022-01-17 NOTE — PROCEDURES
Central Line Insertion    Date/Time: 1/17/2022 12:51 AM  Performed by: Sean Gabriel PA-C  Authorized by: Sean Gabriel PA-C     Patient location:  ICU  Other Assisting Provider: No    Consent:     Consent obtained:  Verbal    Consent given by:  Patient    Risks discussed:  Incorrect placement, infection, arterial puncture, pneumothorax and bleeding  Universal protocol:     Procedure explained and questions answered to patient or proxy's satisfaction: yes      Patient identity confirmed:  Verbally with patient  Pre-procedure details:     Hand hygiene: Hand hygiene performed prior to insertion      Sterile barrier technique: All elements of maximal sterile technique followed      Skin preparation:  ChloraPrep    Skin preparation agent: Skin preparation agent completely dried prior to procedure    Indications:     Central line indications: medications requiring central line and no peripheral vascular access    Anesthesia (see MAR for exact dosages): Anesthesia method:  Local infiltration    Local anesthetic:  Lidocaine 1% w/o epi  Procedure details:     Location:  Right internal jugular    Vessel type: vein      Laterality:  Right    Patient position:  Flat    Catheter type:  Triple lumen 16cm    Catheter size:  7 Fr    Landmarks identified: yes      Ultrasound guidance: yes      Sterile ultrasound techniques: Sterile gel and sterile probe covers were used      Number of attempts:  1    Successful placement: yes    Post-procedure details:     Post-procedure:  Dressing applied and line sutured    Assessment:  Blood return through all ports    Post-procedure complications: none      Patient tolerance of procedure:   Tolerated well, no immediate complications

## 2022-01-17 NOTE — ASSESSMENT & PLAN NOTE
Lab Results   Component Value Date    HGBA1C 6 7 (H) 11/01/2021       Recent Labs     01/16/22  1840 01/16/22 2032 01/16/22 2247 01/17/22  0057   POCGLU 354* 305* 205* 89       Blood Sugar Average: Last 72 hrs:  (P) 124 [id number]     · Takes Metformin and Januvia at home - hold for now  · Insulin gtt  · Fingersticks Q2hrs

## 2022-01-17 NOTE — ASSESSMENT & PLAN NOTE
· Presents with 3 days history of SOB and audible wheezing  · Typically wears 2-3 L NC at baseline - currently requiring 3-4 L NC  · Takes Singulair and mx inhalers including Symbicort, Trelegy Ellipta, Atrovent  · Recently was restarted on Theophylline and placed on Prednisone by PCP  · Received Solu Medrol 60 mg x1 in ED - given additional 65 mg to equal 125 mg   · Scheduled Solu Medrol 40 mg Q8hrs  · Will hold home inhalers and order Xopenex/Atrovent/Pulmicort nebs  · Hold Theophylline for now  · Continue Singulair  · Wean O2 for SpO2 > 88%

## 2022-01-18 PROBLEM — D69.6 THROMBOCYTOPENIA (HCC): Status: ACTIVE | Noted: 2022-01-01

## 2022-01-18 PROBLEM — K72.00 ACUTE LIVER FAILURE: Status: ACTIVE | Noted: 2022-01-01

## 2022-01-18 NOTE — ASSESSMENT & PLAN NOTE
Lab Results   Component Value Date    HGBA1C 9 2 (H) 01/16/2022       Recent Labs     01/18/22  0006 01/18/22  0217 01/18/22  0414 01/18/22  0609   POCGLU 178* 125 158* 191*       Blood Sugar Average: Last 72 hrs:  (P) 883 [id number]     · Takes Metformin and Januvia at home - hold for now  · Insulin gtt  · Fingersticks Q2hrs

## 2022-01-18 NOTE — ASSESSMENT & PLAN NOTE
· Wears 2-3L NC at baseline  · Currently requiring 15 midflow  NC - diffuse wheezing noted on exam  · Continue plan as noted above and wean O2 for goal SpO2 > 88%

## 2022-01-18 NOTE — ASSESSMENT & PLAN NOTE
· Lactic acid 6 4 on admission, increased to 10 4, now downtrending at 2 8  · CT C/A/P results as above  · Trending lactic and liver function panel

## 2022-01-18 NOTE — ASSESSMENT & PLAN NOTE
Wt Readings from Last 3 Encounters:   01/18/22 64 5 kg (142 lb 3 2 oz)   01/10/22 58 1 kg (128 lb)   11/01/21 59 5 kg (131 lb 3 2 oz)     · Echo in 02/2021 reveals EF 60% with mild concentric hypertrophy and G1DD; dilated LA; mild to moderate regurgitation  · Bilateral course crackles on physical exam  · Takes Lasix at home, given 40 IV x2 with minimal response   · Use caution with further fluid resuscitation   · Strict I/O  · Daily weights

## 2022-01-18 NOTE — PROGRESS NOTES
[address]  Progress Note - Ananda Rounds 1935, 82 y o  female MRN: [id number]  Unit/Bed#: ICU 05-01 Encounter: [id number]  Primary Care Provider: LEEROY Stokes   Date and time admitted to hospital: 1/16/2022  7:23 AM    * Sepsis ([address] )  Assessment & Plan  · Presented with 3 days of SOB, sputum production  · AEB AF w/ RVR in 160s, tachypnea, leukocytosis, elevated lactic acid, DEBI  · CXR reveals possible RLL infiltrate  · COVID/FLU/RSV negative  · Blood cultures NGTD  · UA with reflex to culture which is pending  · Initial lactic acid 6 4 - repeat 10 4, now downtrending 2 8  · CT CAP demonstrated small bilateral pleural effusions, right greater than left  Abnormal appearance of the liver possibly related to congestive hepatopathy, Some gas layering non dependently in the right heart main pulmonary artery  Portions of the esophagus are dilated and there is oral contrast material within the esophagus  Moderate amount of stool in the colon suggesting a degree of constipation    · Procal elevated 0 21 > 1 94 > 4 64  · Continue Cefepime/Vanco/Flagyl with unclear source of infection at this point  · Monitor fever and WBC curve    Atrial fibrillation with RVR (HCC)  Assessment & Plan  · Presented in AF w/RVR in 160s  · Suspect this is in setting of sepsis vs COPD exacerbation  · Hx of pAF - on Cardizem 120 mg daily, Toprol XL 75 mg daily; AC w/Eliquis  · Received IV Cardizem push 10 mg x1, followed by 20 mg x1 and subsequently started on Cardizem gtt in ED 1/16-Received 5mg lopressor x 2 with improvement in HR(1/16)  · transitioned off drip during dayshift 1/17  · Received 15 mg IV Cardizem bolus and restarted on Cardizem drip at 7 5mg/hr  · Troponin 32 - 30  · Continue Heparin gtt now  · Titrate Cardizem gtt for goal HR < 120  · Consider Amiodarone gtt if unable to obtain rate control   · Optimize electrolytes  · Trend troponin  · Continue telemetry monitoring    COPD with acute exacerbation ([address] )  Assessment & Plan  · Presents with 3 days history of SOB and audible wheezing  · Typically wears 2-3 L NC at baseline - currently requiring 15L midflow   · Takes Singulair and mx inhalers including Symbicort, Trelegy Ellipta, Atrovent  · Recently was restarted on Theophylline and placed on Prednisone by PCP  · Received Solu Medrol 60 mg x1 in ED - given additional 65 mg to equal 125 mg   · Scheduled Solu Medrol 40 mg Q12  · Will hold home inhalers and order Xopenex/Atrovent/Pulmicort nebs  · Hold Theophylline for now  · Continue Singulair  · Wean O2 for SpO2 > 88%    High anion gap metabolic acidosis  Assessment & Plan  · AG 17 with normal bicarb on admission  · Suspect this is 2/2 lactic acidosis vs DEBI with uremia as well as theophylline use  · Beta hydroxybutyrate normal  · APAP and ASA levels normal  · Avoid theophylline at this time  · Trend AG, lactic acid    Lactic acidosis  Assessment & Plan  · Lactic acid 6 4 on admission, increased to 10 4, now downtrending at 2 8  · CT C/A/P results as above  · Trending lactic and liver function panel     DEBI (acute kidney injury) ([facility] )  Assessment & Plan  · Creatinine 1 34 on admission, now 1 97  · Baseline appears to be around 0 5 - 0 7  · Suspect this is in setting of decreased PO intake, diuretic use, theophylline use, sepsis  · Takes Lasix 40mg BID at home, given 40 IV Lasix x2 with minimal response   · Avoid nephrotoxic agents and hypotension  · Trend renal indices  · Strict I/O  · Daily weights    Type 2 diabetes mellitus without complication, without long-term current use of insulin [facility])  Assessment & Plan  Lab Results   Component Value Date    HGBA1C 9 2 (H) 01/16/2022       Recent Labs     01/18/22  0006 01/18/22  0217 01/18/22  0414 01/18/22  0609   POCGLU 178* 125 158* 191*       Blood Sugar Average: Last 72 hrs:  (P) 906 [id number]     · Takes Metformin and Januvia at home - hold for now  · Insulin gtt  · Fingersticks Q2hrs    Transaminitis  Assessment & Plan  · Differentials include ischemia, medication intake, congestive hepatopathy   · Acute hepatitis panel pending  · GI consult, appreciate input   · LFTs now downtrending, continue to monitor   · RUQ US with no significant findings  · Monitor coags     Essential hypertension  Assessment & Plan  · Takes Toprol XL, Cardizem, Lasix at home  · See plan above    Iron deficiency anemia  Assessment & Plan  · Hgb 8 9 on admission  · Baseline appears to be around 8-10  · Continue home iron supplementation  · Trend hgb  · Monitor for bleeding    Chronic diastolic CHF (congestive heart failure) (HCC)  Assessment & Plan  Wt Readings from Last 3 Encounters:   01/18/22 64 5 kg (142 lb 3 2 oz)   01/10/22 58 1 kg (128 lb)   11/01/21 59 5 kg (131 lb 3 2 oz)     · Echo in 02/2021 reveals EF 60% with mild concentric hypertrophy and G1DD; dilated LA; mild to moderate regurgitation  · Bilateral course crackles on physical exam  · Takes Lasix at home, given 40 IV x2 with minimal response   · Use caution with further fluid resuscitation   · Strict I/O  · Daily weights        Chronic respiratory failure ([address] )  Assessment & Plan  · Wears 2-3L NC at baseline  · Currently requiring 15 midflow  NC - diffuse wheezing noted on exam  · Continue plan as noted above and wean O2 for goal SpO2 > 88%    Gastroesophageal reflux disease without esophagitis  Assessment & Plan  · Continue home PPI        ----------------------------------------------------------------------------------------  HPI/24hr events: Overnight developed afib RVR requiring Cardizem 15mg followed by infusion  Initially converted back to NSR but this AM back in afib RVR  Given additional 40 IV Lasix for increased work of breathing with minimal urine output  Remains on 15L midflow      Patient appropriate for transfer out of the ICU today?: No  Disposition: Continue Stepdown Level 1 level of care   Code Status: Level 1 - Full Code  ---------------------------------------------------------------------------------------  SUBJECTIVE  "My nose is starting to bleed"    Review of Systems   Constitutional: Positive for fatigue  Respiratory: Positive for shortness of breath  Cardiovascular: Negative for chest pain  Gastrointestinal: Positive for abdominal pain  All other systems reviewed and are negative  Review of systems was reviewed and negative unless stated above in HPI/24-hour events   ---------------------------------------------------------------------------------------  OBJECTIVE    Vitals   Vitals:    22 2315 22 0000 22 0300 22 0600   BP:       BP Location:       Pulse: 83      Resp: (!) 39      Temp: 98 8 °F (37 1 °C) 99 °F (37 2 °C)     TempSrc:  Bladder     SpO2: 90%  90%    Weight:    64 5 kg (142 lb 3 2 oz)   Height:         Temp (24hrs), Av 3 °F (36 8 °C), Min:96 6 °F (35 9 °C), Max:99 1 °F (37 3 °C)  Current: Temperature: 99 °F (37 2 °C)          Respiratory:  SpO2: SpO2: 90 %, SpO2 Activity: SpO2 Activity: At Rest, SpO2 Device: O2 Device: Mid flow nasal cannula  Nasal Cannula O2 Flow Rate (L/min): 15 L/min    Invasive/non-invasive ventilation settings   Respiratory  Report   Lab Data (Last 4 hours)    None         O2/Vent Data (Last 4 hours)    None                Physical Exam  Vitals and nursing note reviewed  Constitutional:       Appearance: She is ill-appearing  HENT:      Head: Normocephalic  Mouth/Throat:      Mouth: Mucous membranes are dry  Pharynx: Oropharynx is clear  Eyes:      Pupils: Pupils are equal, round, and reactive to light  Cardiovascular:      Rate and Rhythm: Tachycardia present  Rhythm irregular  Pulses: Normal pulses  Heart sounds: Normal heart sounds  Pulmonary:      Breath sounds: Wheezing, rhonchi and rales present  Abdominal:      Palpations: Abdomen is soft  Tenderness: There is abdominal tenderness     Musculoskeletal: Cervical back: Normal range of motion  Right lower leg: Edema present  Left lower leg: Edema present  Skin:     General: Skin is warm and dry  Neurological:      General: No focal deficit present               Laboratory and Diagnostics:  Results from last 7 days   Lab Units 01/18/22 0446 01/17/22  0534 01/16/22  0749   WBC Thousand/uL 13 68* 23 43* 16 19*   HEMOGLOBIN g/dL 7 5* 7 5* 8 9*   HEMATOCRIT % 26 3* 27 7* 31 5*   PLATELETS Thousands/uL 64* 216 313   BANDS PCT %  --   --  3   MONO PCT %  --   --  2*     Results from last 7 days   Lab Units 01/18/22 0446 01/17/22 2025 01/17/22  0534 01/16/22  0749   SODIUM mmol/L 130* 132* 130* 130*   POTASSIUM mmol/L 4 4 3 6 4 7 4 0   CHLORIDE mmol/L 89* 91* 91* 87*   CO2 mmol/L 20* 26 22 26   ANION GAP mmol/L 21* 15* 17* 17*   BUN mg/dL 62* 53* 50* 44*   CREATININE mg/dL 1 97* 1 64* 1 48* 1 34*   CALCIUM mg/dL 7 4* 7 5* 7 5* 10 3*   GLUCOSE RANDOM mg/dL 194* 82 114 414*   ALT U/L 2,413* 2,915* 2,925*  --    AST U/L 1,902* 3,989* 2,440*  --    ALK PHOS U/L 305* 233* 129*  --    ALBUMIN g/dL 3 1* 3 2* 3 1*  --    TOTAL BILIRUBIN mg/dL 2 71* 1 70* 1 41*  --      Results from last 7 days   Lab Units 01/17/22  0534 01/16/22  0749   MAGNESIUM mg/dL 2 7 2 2   PHOSPHORUS mg/dL 5 0*  --       Results from last 7 days   Lab Units 01/18/22  0446 01/17/22  0756 01/17/22  0105 01/16/22  1830 01/16/22  0749   INR  2 08*  --   --   --  1 15   PTT seconds 159* 80* 77* 30 25          Results from last 7 days   Lab Units 01/18/22  0446 01/17/22  2319 01/17/22 2025 01/17/22  1108 01/17/22  0756 01/17/22  0534 01/17/22  0232   LACTIC ACID mmol/L 2 8* 3 5* 3 7* 4 9* 9 4* 8 2* 7 6*     ABG:  Results from last 7 days   Lab Units 01/17/22 1958   PH ART  7 383   PCO2 ART mm Hg 36 8   PO2 ART mm Hg 244 7*   HCO3 ART mmol/L 21 4*   BASE EXC ART mmol/L -3 3   ABG SOURCE  Radial, Left     VBG:  Results from last 7 days   Lab Units 01/17/22 1958   ABG SOURCE  Radial, Left Results from last 7 days   Lab Units 01/18/22  0446 01/17/22  0756 01/16/22  0835   PROCALCITONIN ng/ml 4 64* 1 94* 0 21       Micro  Results from last 7 days   Lab Units 01/17/22  1315 01/16/22  1455 01/16/22  0835   BLOOD CULTURE   --   --  No Growth at 24 hrs  No Growth at 24 hrs  LEGIONELLA URINARY ANTIGEN  Negative  --   --    STREP PNEUMONIAE ANTIGEN, URINE   --  Negative  --        EKG: afib RVR  Imaging: I have personally reviewed pertinent reports  and I have personally reviewed pertinent films in PACS    Intake and Output  I/O       01/16 0701  01/17 0700 01/17 0701  01/18 0700    P  O  480 25    I V  (mL/kg) 933 8 (15 8) 275 3 (4 3)    IV Piggyback 300 437 6    Total Intake(mL/kg) 1713 8 (28 9) 737 9 (11 4)    Urine (mL/kg/hr) 580 660 (0 4)    Total Output 580 660    Net +1133 8 +77 9                Height and Weights   Height: 5' (152 4 cm)     Body mass index is 27 77 kg/m²  Weight (last 2 days)     Date/Time Weight    01/18/22 0600 64 5 (142 2)    01/17/22 1613 59 (130)    01/17/22 0544 59 2 (130 51)    01/16/22 0740 58 1 (128)            Nutrition       Diet Orders   (From admission, onward)             Start     Ordered    01/17/22 0004  Diet NPO  Diet effective now        References:    Nutrtion Support Algorithm Enteral vs  Parenteral   Question Answer Comment   Diet Type NPO    RD to adjust diet per protocol?  No        01/17/22 0004                  Active Medications  Scheduled Meds:  Current Facility-Administered Medications   Medication Dose Route Frequency Provider Last Rate    acetaminophen  650 mg Oral Q6H PRN Linda Carvajal PA-C      aspirin  81 mg Oral Weekly LEEROY Daniel      bisacodyl  10 mg Rectal Daily PRN Linda Carvajal PA-C      budesonide  0 5 mg Nebulization Q12H LEEROY Daniel      cefepime  1,000 mg Intravenous Q12H Linda Carvajal PA-C 1,000 mg (01/18/22 0026)    cholecalciferol  400 Units Oral Daily LEEROY Fitzpatrick      diltiazem  1-15 mg/hr Intravenous Titrated LEEROY Palmer 7 5 mg/hr (01/17/22 2151)    heparin (porcine)  3-20 Units/kg/hr (Order-Specific) Intravenous Titrated LEEROY Alex 13 Units/kg/hr (01/18/22 5290)    heparin (porcine)  1,650 Units Intravenous Q1H PRN LEEROY Giles      heparin (porcine)  3,300 Units Intravenous Q1H PRN LEEROY Alex      insulin regular (HumuLIN R,NovoLIN R) infusion  0 3-21 Units/hr Intravenous Titrated LEEROY Daniel 2 Units/hr (01/18/22 0636)    ipratropium  0 5 mg Nebulization TID Jennifer Swan MD      iron polysaccharides  150 mg Oral Daily LEEROY Alex      levalbuterol  1 25 mg Nebulization TID Jennifer Swan MD      methylPREDNISolone sodium succinate  40 mg Intravenous Q12H [facility] LEEROY Davis      metroNIDAZOLE  500 mg Oral [facility] Janell Carty PA-C      montelukast  10 mg Oral HS LEEROY Daniel      norepinephrine  1-30 mcg/min Intravenous Titrated LEEROY Palmer      ondansetron  4 mg Intravenous Q6H PRN Janell Carty PA-C      pantoprazole  40 mg Oral Early Morning LEEROY Daniel      polyethylene glycol  17 g Oral Daily Janell Carty PA-C      senna-docusate sodium  2 tablet Oral BID MELISSA Eubanks-EDDIE      sodium chloride  3 mL Nebulization Once LEEROY Alex      vancomycin  12 5 mg/kg Intravenous Q24H ANGELLA EubanksC 750 mg (01/17/22 2304)     Continuous Infusions:  diltiazem, 1-15 mg/hr, Last Rate: 7 5 mg/hr (01/17/22 2151)  heparin (porcine), 3-20 Units/kg/hr (Order-Specific), Last Rate: 13 Units/kg/hr (01/18/22 0634)  insulin regular (HumuLIN R,NovoLIN R) infusion, 0 3-21 Units/hr, Last Rate: 2 Units/hr (01/18/22 0636)  norepinephrine, 1-30 mcg/min      PRN Meds:   acetaminophen, 650 mg, Q6H PRN  bisacodyl, 10 mg, Daily PRN  heparin (porcine), 1,650 Units, Q1H PRN  heparin (porcine), 3,300 Units, Q1H PRN  ondansetron, 4 mg, Q6H PRN        Invasive Devices Review  Invasive Devices  Report    Central Venous Catheter Line            CVC Central Lines 01/17/22 Triple 16cm 1 day          Peripheral Intravenous Line            Peripheral IV 01/16/22 Left Hand 1 day    Peripheral IV 01/16/22 Right Antecubital 1 day          Drain            Urethral Catheter Temperature probe 16 Fr  1 day                Rationale for remaining devices: accurate I & O  ---------------------------------------------------------------------------------------  Advance Directive and Living Will: Yes    Power of : Yes  POLST:    ---------------------------------------------------------------------------------------  Care Time Delivered:   Upon my evaluation, this patient had a high probability of imminent or life-threatening deterioration due to acute on chronic hypoxic respiratory failure , which required my direct attention, intervention, and personal management  I have personally provided 45 minutes (0600 to 0645) of critical care time, exclusive of procedures, teaching, family meetings, and any prior time recorded by providers other than myself  LEEROY Valencia      Portions of the record may have been created with voice recognition software  Occasional wrong word or "sound a like" substitutions may have occurred due to the inherent limitations of voice recognition software    Read the chart carefully and recognize, using context, where substitutions have occurred

## 2022-01-18 NOTE — NURSING NOTE
Remains restrained for safety  Found with leg through the side rail while attempting to exit the bed  Very confused and restless  Pt is noted to have more open areas from removing catheter y[place] and scratching herself to right knee with the pulse ox tape on her finger  Oozing blood from site  Dressed with gauze and tegaderms but patient  keeps removing them and causing more skin tears  Oozing also now noted from IV insertion sites  Will monitor labs

## 2022-01-18 NOTE — PROGRESS NOTES
Progress Note- Abi Finney 82 y o  female MRN: [id number]    Unit/Bed#: ICU 05-01 Encounter: [id number]      Assessment and Plan:    Patient is a 82 y o  female with PMH significant for diastolic CHF, paroxysmal AFib on Eliquis, HTN, COPD with home O2 2-3 L via NC, DM2, GERD, chronic BRUNILDA who initially presented on 01/16 with acute onset of SOB with sepsis of unknown origin  Currently receiving vancomycin, cefepime, Flagyl  Hospital course complicated by AFib with RVR on heparin gtt and COPD exacerbation requiring midflow O2  Significantly elevated transaminitis on 1/17  CMP today with down trending liver enzymes, but elevated alk phos and T  bili (AST 1902, ALT 2413, alk-phos 305, T bili 2 71)  Iron panel with low iron sat and iron, normal TIBC and ferritin  Acute Hep panel negative      Of note, CTA/P on 01/17 with GI-related results as follows: Abnormal appearance of the liver, possibly related to congestive hepatopathy; portions esophagus are dilated there is oral contrast material of the esophagus extending to the cervical esophagus suggesting marked gastroesophageal reflux; moderate amount of stool in the colon suggesting a degree of constipation; Prior gastrojejunostomy and postoperative changes rectosigmoid  Follow-up RUQ US on 01/17 with fatty liver; sludge in gallbladder; without acute cholecystitis or biliary dilation; patent portal venous system       Likely shock liver vs  ischemic hepatitis given rapidly elevated transaminitis  Differential also includes acute hepatitis infection, less likely drug induced liver injury  Will monitor physical exam closely for signs of developing cholecystitis given persistently elevated alk phos, T  Bili, and sludge noted on RUQ US   Decreased bowel sounds and left-sdied abdominal firmness concerning for ileus vs free air in abdomen, will order KUB for further evaluation    - Continue supportive measures  - Continue daily Protonix   - Trend hepatic function daily  - Avoid hepatotoxic agents  - Due to new abdominal firmness and hypoactive bowel sounds, ordered KUB to assess for ileus vs  free air in abdomen     Discussed case with attending via telephone  GI will continue to follow  ______________________________________________________________________    Subjective:     Patient found lying comfortably bed  Patient is slightly more cooperative today  Admits to left upper quadrant and left lower quadrant abdominal tenderness  Per nursing, patient has not had a BM in ?days  Patient with unintelligible response for remainder of interview         Medication Administration - last 24 hours from 01/17/2022 0822 to 01/18/2022 0223       Date/Time Order Dose Route Action Action by     01/18/2022 0724 budesonide (PULMICORT) inhalation solution 0 5 mg 0 5 mg Nebulization Not Given Lisa Ni, RT     01/17/2022 2214 budesonide (PULMICORT) inhalation solution 0 5 mg 0 5 mg Nebulization Refused Divine Gamboa, RT     01/17/2022 1316 ipratropium (ATROVENT) 0 02 % inhalation solution 0 5 mg 0 5 mg Nebulization Refused Lisa Ni, RT     01/17/2022 1317 levalbuterol (XOPENEX) inhalation solution 1 25 mg 1 25 mg Nebulization Refused Lisa Raine, RT     01/17/2022 0855 cholecalciferol (VITAMIN D3) tablet 400 Units 400 Units Oral Not Given Brunilda Patel RN     01/17/2022 2151 montelukast (SINGULAIR) tablet 10 mg 10 mg Oral Given Candido Rae RN     01/18/2022 0624 pantoprazole (PROTONIX) EC tablet 40 mg 40 mg Oral Given Candido Rae RN     01/18/2022 8283 heparin (porcine) 25,000 units in 0 45% NaCl 250 mL infusion (premix) 13 Units/kg/hr Intravenous Restarted Candido Rae RN     01/18/2022 0510 heparin (porcine) 25,000 units in 0 45% NaCl 250 mL infusion (premix) 0 Units/kg/hr Intravenous Stopped Candido Rae RN     01/17/2022 1846 heparin (porcine) 25,000 units in 0 45% NaCl 250 mL infusion (premix) 16 Units/kg/hr Intravenous New Bag Brunilda Patel, RN 01/17/2022 0859 heparin (porcine) 25,000 units in 0 45% NaCl 250 mL infusion (premix) 16 Units/kg/hr Intravenous Rate/Dose Change Amna Campo RN     01/17/2022 0904 diltiazem (CARDIZEM) 125 mg in sodium chloride 0 9 % 125 mL infusion 0 mg/hr Intravenous Stopped Amna Campo RN     01/17/2022 1320 methylPREDNISolone sodium succinate (Solu-MEDROL) injection 40 mg   Intravenous Canceled Entry Amna Campo RN     01/18/2022 0636 insulin regular (HumuLIN R,NovoLIN R) 1 Units/mL in sodium chloride 0 9 % 100 mL infusion 2 Units/hr Intravenous Rate/Dose Change Staci Malloy RN     01/18/2022 0417 insulin regular (HumuLIN R,NovoLIN R) 1 Units/mL in sodium chloride 0 9 % 100 mL infusion 1 Units/hr Intravenous Rate/Dose Change Staci Malloy RN     01/18/2022 0226 insulin regular (HumuLIN R,NovoLIN R) 1 Units/mL in sodium chloride 0 9 % 100 mL infusion 0 5 Units/hr Intravenous Rate/Dose Change Staci Malloy RN     01/18/2022 0052 insulin regular (HumuLIN R,NovoLIN R) 1 Units/mL in sodium chloride 0 9 % 100 mL infusion 1 Units/hr Intravenous Restarted Staci Malloy RN     01/17/2022 2211 insulin regular (HumuLIN R,NovoLIN R) 1 Units/mL in sodium chloride 0 9 % 100 mL infusion 0 Units/hr Intravenous Stopped Staci Malloy RN     01/17/2022 2206 insulin regular (HumuLIN R,NovoLIN R) 1 Units/mL in sodium chloride 0 9 % 100 mL infusion 0 5 Units/hr Intravenous [address] Staci Malloy [facility]     01/17/2022 2028 insulin regular (HumuLIN R,NovoLIN R) 1 Units/mL in sodium chloride 0 9 % 100 mL infusion 0 5 Units/hr Intravenous Rate/Dose Change Staci Malloy RN     01/17/2022 1806 insulin regular (HumuLIN R,NovoLIN R) 1 Units/mL in sodium chloride 0 9 % 100 mL infusion 6 Units/hr Intravenous Rate/Dose Change Amna Campo RN     01/17/2022 1631 insulin regular (HumuLIN R,NovoLIN R) 1 Units/mL in sodium chloride 0 9 % 100 mL infusion 5 Units/hr Intravenous Rate/Dose Change Amna Campo RN     01/17/2022 1400 insulin regular (HumuLIN R,NovoLIN R) 1 Units/mL in sodium chloride 0 9 % 100 mL infusion 3 Units/hr Intravenous Rate/Dose Change Catracho Garcia RN     01/17/2022 1210 insulin regular (HumuLIN R,NovoLIN R) 1 Units/mL in sodium chloride 0 9 % 100 mL infusion 1 5 Units/hr Intravenous Rate/Dose Change Catracho Garcia, TATUM     01/17/2022 1020 insulin regular (HumuLIN R,NovoLIN R) 1 Units/mL in sodium chloride 0 9 % 100 mL infusion 1 5 Units/hr Intravenous Rate/Dose Change Catracho Garcia, TATUM     01/17/2022 0932 insulin regular (HumuLIN R,NovoLIN R) 1 Units/mL in sodium chloride 0 9 % 100 mL infusion 1 5 Units/hr Intravenous Rate/Dose Change Catracho Garcia RN     01/18/2022 0026 cefepime (MAXIPIME) IVPB (premix in dextrose) 1,000 mg 50 mL 1,000 mg [facility]ous [facility] TATUM     01/17/2022 1330 cefepime (MAXIPIME) IVPB (premix in dextrose) 1,000 mg 50 mL 0 mg Intravenous Stopped Catracho Garcia, TATUM     01/17/2022 1300 cefepime (MAXIPIME) IVPB (premix in dextrose) 1,000 mg 50 mL 1,000 mg Intravenous New [address] [place] [address], RN     01/18/2022 8857 metroNIDAZOLE (FLAGYL) tablet 500 mg 500 mg Oral Given [facility], TATUM     01/17/2022 2151 metroNIDAZOLE (FLAGYL) tablet 500 mg 500 mg Oral Given [facility], TATUM     01/17/2022 1522 metroNIDAZOLE (FLAGYL) tablet 500 mg 500 mg Oral Not Given Catracho Garcia, TATUM     01/17/2022 0857 metroNIDAZOLE (FLAGYL) tablet 500 mg 500 mg Oral Not Given Catracho Garcia, TATUM     01/17/2022 0855 iron polysaccharides (FERREX) capsule 150 mg 150 mg Oral Not Given Catracho Garcia, TATUM     01/17/2022 0855 polyethylene glycol (MIRALAX) packet 17 g 17 g Oral Not Given Catracho Garcia RN     01/17/2022 1755 senna-docusate sodium (SENOKOT S) 8 6-50 mg per tablet 2 tablet 2 tablet Oral Not Given Catracho Garcia RN     01/17/2022 0855 senna-docusate sodium (SENOKOT S) 8 6-50 mg per tablet 2 tablet 2 tablet Oral Not Given Catracho Garcia RN     01/17/2022 2303 vancomycin (VANCOCIN) IVPB (premix in dextrose) 750 mg 150 mL 750 mg Intravenous [address] Steven Ruggiero, [address] [facility]     01/17/2022 2053 methylPREDNISolone sodium succinate (Solu-MEDROL) injection 40 mg 40 mg Intravenous Given Steven Ruggiero RN     01/17/2022 1424 acetylcysteine (ACETADOTE) 8,880 mg in dextrose 5 % 200 mL IVPB 0 mg Intravenous Stopped Beth Goodson RN     01/17/2022 1323 acetylcysteine (ACETADOTE) 8,880 mg in dextrose 5 % 200 mL IVPB 8,880 mg Intravenous [address] Beth Goodson RN     01/17/2022 1755 acetylcysteine (ACETADOTE) 2,960 mg in dextrose 5 % 500 mL IVPB 0 mg Intravenous Stopped Beth Goodson RN     01/17/2022 1431 acetylcysteine (ACETADOTE) 2,960 mg in dextrose 5 % 500 mL IVPB 2,960 mg Intravenous New [address] [place] [address], [address] [place] [address]     01/17/2022 1755 acetylcysteine (ACETADOTE) 5,920 mg in dextrose 5 % 1,000 mL IVPB   Intravenous Canceled Entry Beth Goodson RN     01/17/2022 1756 furosemide (LASIX) injection 40 mg 40 mg Intravenous Given Beth Goodson RN     01/17/2022 1757 haloperidol lactate (HALDOL) injection 2 mg 2 mg Intravenous Given Beth Goodson RN     01/17/2022 1844 diltiazem (CARDIZEM) tablet 30 mg 30 mg Oral Given Beth Goodson RN     01/18/2022 0726 ipratropium (ATROVENT) 0 02 % inhalation solution 0 5 mg 0 5 mg Nebulization Not Given Kris Magaña, RT     01/17/2022 2214 ipratropium (ATROVENT) 0 02 % inhalation solution 0 5 mg 0 5 mg Nebulization Refused Divine Gamboa, RT     01/18/2022 0726 levalbuterol (Shin Calk) inhalation solution 1 25 mg 1 25 mg Nebulization Not Given Kris Magaña, RT     01/17/2022 2214 levalbuterol (XOPENEX) inhalation solution 1 25 mg 1 25 mg Nebulization Refused Divine Gamboa, RT     01/17/2022 2043 diltiazem (CARDIZEM) injection 15 mg 15 mg Intravenous Given Steven Ruggiero RN     01/17/2022 2048 furosemide (LASIX) injection 40 mg 40 mg Intravenous Given Steven Ruggiero RN     01/18/2022 0735 diltiazem (CARDIZEM) 125 mg in sodium chloride 0 9 % 125 mL infusion 15 mg/hr Intravenous Rate/Dose Change Irina Govea RN     01/18/2022 0713 diltiazem (CARDIZEM) 125 mg in sodium chloride 0 9 % 125 mL infusion 12 5 mg/hr Intravenous Rate/Dose Change Danisha Parker RN     01/18/2022 0643 diltiazem (CARDIZEM) 125 mg in sodium chloride 0 9 % 125 mL infusion 10 mg/hr Intravenous Rate/Dose Change Danisha Parker RN     01/17/2022 2151 diltiazem (CARDIZEM) 125 mg in sodium chloride 0 9 % 125 mL infusion 7 5 mg/hr Intravenous Rate/Dose Change Danisha Parker RN     01/17/2022 2100 diltiazem (CARDIZEM) 125 mg in sodium chloride 0 9 % 125 mL infusion 5 mg/hr Intravenous [address] Danisha Parker, [facility]     01/18/2022 0418 NOREPINEPHRINE 4 MG  ML NSS (CMPD ORDER) infusion 1 mcg/min Intravenous Not Given Danisha Parker RN     01/17/2022 2330 potassium chloride 20 mEq IVPB (premix) 20 mEq Intravenous New Bag Danisha Parker RN          Objective:     Vitals: Blood pressure 111/71, pulse (!) 153, temperature 99 °F (37 2 °C), temperature source Bladder, resp  rate 22, height 5' (1 524 m), weight 64 5 kg (142 lb 3 2 oz), SpO2 96 %, not currently breastfeeding  ,Body mass index is 27 77 kg/m²  Intake/Output Summary (Last 24 hours) at 1/18/2022 4774  Last data filed at 1/18/2022 0800  Gross per 24 hour   Intake 737 88 ml   Output 655 ml   Net 82 88 ml       Physical Exam:   General Appearance: +Mild jaundice; Limited cooperation with questions;  Awake, NOT AAO x3 in no acute distress  Abdomen: +LUQ and LLQ with firmness and mild TTP, +distention, +hypoactive bowel sounds; no masses or no organomegaly  Lungs: Decreased breath sounds b/l; respirations labored with abdominal contractions  Skin: Mottled extremities with multiple areas of skin tearing and ecchymosis    Invasive Devices  Report    Central Venous Catheter Line            CVC Central Lines 01/17/22 Triple 16cm 1 day          Peripheral Intravenous Line            Peripheral IV 01/16/22 Left Hand 1 day    Peripheral IV 01/16/22 Right Antecubital 1 day          Drain            Urethral Catheter Temperature probe 16 Fr  1 day                Lab Results:  No results displayed because visit has over 200 results  Imaging Studies: I have personally reviewed pertinent imaging studies

## 2022-01-18 NOTE — ASSESSMENT & PLAN NOTE
· Presented with 3 days of SOB, sputum production  · AEB AF w/ RVR in 160s, tachypnea, leukocytosis, elevated lactic acid, DEBI  · CXR reveals possible RLL infiltrate  · COVID/FLU/RSV negative  · Blood cultures NGTD  · UA with reflex to culture which is pending  · Initial lactic acid 6 4 - repeat 10 4, now downtrending 2 8  · CT CAP demonstrated small bilateral pleural effusions, right greater than left  Abnormal appearance of the liver possibly related to congestive hepatopathy, Some gas layering non dependently in the right heart main pulmonary artery  Portions of the esophagus are dilated and there is oral contrast material within the esophagus  Moderate amount of stool in the colon suggesting a degree of constipation    · Procal elevated 0 21 > 1 94 > 4 64  · Continue Cefepime/Vanco/Flagyl with unclear source of infection at this point  · Monitor fever and WBC curve

## 2022-01-18 NOTE — ASSESSMENT & PLAN NOTE
· Creatinine 1 34 on admission, now 1 97  · Baseline appears to be around 0 5 - 0 7  · Suspect this is in setting of decreased PO intake, diuretic use, theophylline use, sepsis  · Takes Lasix 40mg BID at home, given 40 IV Lasix x2 with minimal response   · Avoid nephrotoxic agents and hypotension  · Trend renal indices  · Strict I/O  · Daily weights

## 2022-01-18 NOTE — ASSESSMENT & PLAN NOTE
· Presents with 3 days history of SOB and audible wheezing  · Typically wears 2-3 L NC at baseline - currently requiring 15L midflow   · Takes Singulair and mx inhalers including Symbicort, Trelegy Ellipta, Atrovent  · Recently was restarted on Theophylline and placed on Prednisone by PCP  · Received Solu Medrol 60 mg x1 in ED - given additional 65 mg to equal 125 mg   · Scheduled Solu Medrol 40 mg Q12  · Will hold home inhalers and order Xopenex/Atrovent/Pulmicort nebs  · Hold Theophylline for now  · Continue Singulair  · Wean O2 for SpO2 > 88%

## 2022-01-18 NOTE — ACP (ADVANCE CARE PLANNING)
Advanced Care Planning Note     Ga Gibson 82 y o  female MRN: [id number]    Unit/Bed#: ICU 05-01 Encounter: [id number]    Ga Gibson is an 82 female that presented today secondary to having an acute condition requiring critical care provider evaluation  The patient has chronic comorbidities, including but not limited to  diastolic HF, pAF - AC w/Eliquis, HTN, COPD - 2-3L NC at baseline, DM2, GERD,, and now is inflicted with following acute conditions: acute hypoxemic respiratory failure, acute liver injury, acute kidney injury, metabolic encephalopathy  Due to the severity of the patient's acute condition and/or the extent of chronic conditions, there is need for advanced care planning at this time  Please see my previous documentation in regards to the patient's current condition, assessment, and treatment plan  During this discussion with the patient and/or family members, it was established that all stake holders were agreeable and understood the rationale for advanced care planning to occur at this time  The following individuals were present & participated in the face to face conversation I had about the patient's advanced directives & advanced care planning: POTOMEKA Ott Edgar in person, grand-daughter Edgar Escoto by phone     Please see my following comments for details of the conversation & decisions that were made:     level 3 DNR/DNI with possible progression to level 4 comfort-directed measures if she worsens or does not improve      Total time spent, (20) minutes    CODE STATUS: Level 3 - DNAR and DNI  POA: Yes  POLST:      SIGNATURE: Mitzie Nyhan, MD  DATE: January 18, 2022  TIME: 1:53 PM

## 2022-01-18 NOTE — ASSESSMENT & PLAN NOTE
· Differentials include ischemia, medication intake, congestive hepatopathy   · Acute hepatitis panel pending  · GI consult, appreciate input   · LFTs now downtrending, continue to monitor   · RUQ US with no significant findings  · Monitor coags

## 2022-01-18 NOTE — ASSESSMENT & PLAN NOTE
· Presented in AF w/RVR in 160s  · Suspect this is in setting of sepsis vs COPD exacerbation  · Hx of pAF - on Cardizem 120 mg daily, Toprol XL 75 mg daily; AC w/Eliquis  · Received IV Cardizem push 10 mg x1, followed by 20 mg x1 and subsequently started on Cardizem gtt in ED 1/16-Received 5mg lopressor x 2 with improvement in HR(1/16)  · transitioned off drip during dayshift 1/17  · Received 15 mg IV Cardizem bolus and restarted on Cardizem drip at 7 5mg/hr  · Troponin 32 - 30  · Continue Heparin gtt now  · Titrate Cardizem gtt for goal HR < 120  · Consider Amiodarone gtt if unable to obtain rate control   · Optimize electrolytes  · Trend troponin  · Continue telemetry monitoring

## 2022-01-18 NOTE — NURSING NOTE
Pt is distressed with agitation and restlessness  Attempting to get OOB  Pulling at urinary catheter and confused to time, place, and situation  Moaning and calling out  Restrained for safety given elevated oxygen requirement  Leaning to left but otherwise no focal neuro deficit  Hallucinating visual and auditory  Heart tones are muffled  Pulses are weak and thready  Color is jaundice with pallor to extremities with mottled areas  +3 pitting edema to all extremities  Marked increase in discoloration due to bruising  Multiple blistered areas are darker appearing than last encounter  Lungs are coarse with rhonchi and upper airway gurgling  Coughing and deep breathing done with no improvement  Cough is weak and ineffective  Respirations are labored  Abdomen is somewhat firm with hypoactive bowel sounds  No BM  Urinary catheter draining small amounts of pale riley cloudy urine  CVP intact  IV sites intact  Allevyn dressings to right leg and left arm intact  Weeping noted to left hand site from previous IV

## 2022-01-19 NOTE — DISCHARGE SUMMARY
Discharge Summary - Aimee Michele 82 y o  female MRN: [id number]    Unit/Bed#: ICU 05-01 Encounter: [id number] PCP: LEEROY Rene    Admission Date:   Admission Orders (From admission, onward)     Ordered        01/16/22 1132  Inpatient Admission  Once                        Admitting Diagnosis: Atrial fibrillation ([facility] [address] ) [I48 91]  COPD (chronic obstructive pulmonary disease) ([facility] [address] ) [J44 9]  SOB (shortness of breath) [R06 02]    HPI: Per Bailee Costa, "Aimee Michele is a 82 y o  female w/PMHx of diastolic HF, pAF - AC w/Eliquis, HTN, COPD - 2-3L NC at baseline, DM2, GERD, BRUNILDA who presents with SOB and sputum production x3 days  Patient states that she saw her PCP about 2 weeks ago, "but they didn't do anything " Upon review, it appears that patient was restarted on theophylline and was placed on prednisone 20 mg daily at time of appointment  She denies any fever, chills, chest pain, dizziness, lightheadedness, abdominal pain  She states that she has been urinating and having BMs "like normal " She does endorse decreased PO intake for past 3 days  She denies any sick contacts and states that she is compliant with medications  Patient will be admitted to ICU for further work-up and monitoring "    Procedures Performed:   Orders Placed This Encounter   Procedures   [address] Course: Unsuccessful at diuresing the patient  Developed significant lactic acidosis, was then found to be in liver failure  Worsening renal function and decreased urine output  Was treated with broad spectrum antibiotics with no clear source of infection discovered  Had worsening encephalopathy  Family meeting was held and the patient was made Level 3 DNR/DNI  Overnight became hypoxic with increased work of breathing, family was contacted and elected to transition patient to comfort focused care      Significant Findings, Care, Treatment and Services Provided:   1/17 CT CAP There are small bilateral pleural effusions, right larger than left  There is septal thickening and interstitial coarsening  These findings suggest fluid overload/CHF  Clinical correlation and follow-up is recommended    Abnormal appearance of the liver, as described above, possibly related to congestive hepatopathy  Clinical correlation, laboratory correlation and follow-up is recommended  Sclerotic appearing compression deformity of T10 is new compared with 2020  If clinically appropriate, this could be further evaluated with MRI, if there are no contraindications  There is some gas layering non-dependently in the right heart and main pulmonary artery, likely iatrogenic  Portions of the esophagus are dilated and there is oral contrast material within the esophagus extending to the cervical esophagus  This suggests marked gastroesophageal reflux  There is a moderate amount of stool in the colon suggesting a degree of constipation  Moderate to advanced emphysema     RUQ US: with no significant findings    Complications: liver and renal failure as well as acute hypoxic respiratory failure    Disposition:      Final Diagnosis: Sepsis with acute liver failure     Medical Problems             Resolved Problems  Date Reviewed: 2022    None                Condition at Time of Death: comfort measures    Date, Time and Cause of Death    Date of Death: 22  Time of Death: 12:32 PM  Preliminary Cause of [facility]: [facility]  Entered by: LEEROY Fleming[KN1 1]     Attribution     KN1 1 LEEROY Fleming 22 12:47          Death Note:    INPATIENT DEATH NOTE  Yoel Rosado 82 y o  female MRN: [id number]  Unit/Bed#: ICU 05- Encounter: [id number]    Date, Time and Cause of Death    Date of Death: 22  Time of Death: 12:32 PM  Preliminary Cause of Death: Sepsis ([facility] )  Entered by: LEEROY Fleming[KN1 1]     Attribution     KN1 1 LEEROY Fleming 22 12:47  Patient's Information  Pronounced by: LEEROY Damon  Did the patient's death occur in the ED?: No  Did the patient's death occur in the OR?: No  Did the patient's death occur less than 10 days post-op?: No  Did the patient's death occur within 24 hours of admission?: No  Was code status DNR at the time of death?: Yes    PHYSICAL EXAM:  Unresponsive to noxious stimuli, Spontaneous respirations absent, Breath sounds absent, Carotid pulse absent and Heart sounds absent    Medical Examiner notification criteria:  NONE APPLICABLE   Medical Examiner's office notified?:  No, does not meet ME notification criteria   Medical Examiner accepted case?:  No  Name of Medical Examiner: See nursing documentation    Family Notification  Was the family notified?: Yes  Date Notified: 22  Time Notified: [id number]  Notified by: Raúl Damon  Name of Family Notified of Death: [facility]   Relationship to Patient: Other (Comment)  Family Notification Route: Telephone  Was the family told to contact a  home?: Yes  Name of [facility][de-identified] [address] [place] [address]    Autopsy Options:  Post-mortem examination declined by next of kin    Primary Service Attending Physician notified?:  yes - Attending:  Selina Parra MD    Physician/Resident responsible for completing Discharge Summary:  LEEROY Damon

## 2022-01-19 NOTE — DEATH NOTE
INPATIENT DEATH NOTE  Lena Smith 82 y o  female MRN: [id number]  Unit/Bed#: ICU  Encounter: [id number]    Date, Time and Cause of Death    Date of Death: 22  Time of Death: 12:32 PM  Preliminary Cause of Death: Sepsis ([facility] [address] )  Entered by: LEEROY Rios[KN1 [id number]]     Attribution     KN1 1 LEEROY Rios 22 12:47           Patient's Information  Pronounced by: LEEROY Rios  Did the patient's death occur in the ED?: No  Did the patient's death occur in the OR?: No  Did the patient's death occur less than 10 days post-op?: No  Did the patient's death occur within 24 hours of admission?: No  Was code status DNR at the time of death?: Yes    PHYSICAL EXAM:  Unresponsive to noxious stimuli, Spontaneous respirations absent, Breath sounds absent, Carotid pulse absent and Heart sounds absent    Medical Examiner notification criteria:  NONE APPLICABLE   Medical Examiner's office notified?:  No, does not meet ME notification criteria   Medical Examiner accepted case?:  No  Name of Medical Examiner: See nursing documentation    Family Notification  Was the family notified?: Yes  Date Notified: 22  Time Notified: 3690  Notified by: aRúl Rios  Name of Family Notified of Death: Karol Varela ([facility])   Relationship to Patient: Other (Comment)  Family Notification Route: Telephone  Was the family told to contact a  home?: Yes  Name of [facility][de-identified] [address] [place] [address]    Autopsy Options:  Post-mortem examination declined by next of kin    Primary Service Attending Physician notified?:  yes - Attending:  Gonzalo Bradley MD    Physician/Resident responsible for completing Discharge Summary:  LEEROY Rios

## 2022-01-19 NOTE — ASSESSMENT & PLAN NOTE
· Presented with 3 days of SOB, sputum production  · AEB AF w/ RVR in 160s, tachypnea, leukocytosis, elevated lactic acid, DEBI  · CXR reveals possible RLL infiltrate  · COVID/FLU/RSV negative  · Blood cultures NGTD  · UA with reflex to culture which is pending  · Initial lactic acid 6 4 - repeat 10 4, now downtrending 2 8  · CT CAP demonstrated small bilateral pleural effusions, right greater than left  Abnormal appearance of the liver possibly related to congestive hepatopathy, Some gas layering non dependently in the right heart main pulmonary artery  Portions of the esophagus are dilated and there is oral contrast material within the esophagus  Moderate amount of stool in the colon suggesting a degree of constipation  · Procal elevated 0 21 > 1 94 > 4 64  · Continue Cefepime/Vanco/Flagyl with unclear source of infection at this point  · Monitor fever and WBC curve    Treatment discontinued and patient transitioned to comfort measures

## 2022-01-19 NOTE — PROGRESS NOTES
[address]  Progress Note - Yohan Sahni 1935, 82 y o  female MRN: [id number]  Unit/Bed#: ICU 05-01 Encounter: [id number]  Primary Care Provider: LEEROY Sanchez   Date and time admitted to hospital: 1/16/2022  7:23 AM    * Sepsis ([address] )  Assessment & Plan  · Presented with 3 days of SOB, sputum production  · AEB AF w/ RVR in 160s, tachypnea, leukocytosis, elevated lactic acid, DEBI  · CXR reveals possible RLL infiltrate  · COVID/FLU/RSV negative  · Blood cultures NGTD  · UA with reflex to culture which is pending  · Initial lactic acid 6 4 - repeat 10 4, now downtrending 2 8  · CT CAP demonstrated small bilateral pleural effusions, right greater than left  Abnormal appearance of the liver possibly related to congestive hepatopathy, Some gas layering non dependently in the right heart main pulmonary artery  Portions of the esophagus are dilated and there is oral contrast material within the esophagus  Moderate amount of stool in the colon suggesting a degree of constipation  · Procal elevated 0 21 > 1 94 > 4 64  · Continue Cefepime/Vanco/Flagyl with unclear source of infection at this point  · Monitor fever and WBC curve    Treatment discontinued and patient transitioned to comfort measures      Atrial fibrillation with RVR (HCC)  Assessment & Plan  · Presented in AF w/RVR in 160s  · Suspect this is in setting of sepsis vs COPD exacerbation  · Hx of pAF - on Cardizem 120 mg daily, Toprol XL 75 mg daily; AC w/Eliquis  · Received IV Cardizem push 10 mg x1, followed by 20 mg x1 and subsequently started on Cardizem gtt in ED 1/16-Received 5mg lopressor x 2 with improvement in HR(1/16)  · transitioned off drip during dayshift 1/17  · Received 15 mg IV Cardizem bolus and restarted on Cardizem drip at 7 5mg/hr  · Troponin 32 - 30  · Continue Heparin gtt now  · Titrate Cardizem gtt for goal HR < 120  · Consider Amiodarone gtt if unable to obtain rate control   · Optimize electrolytes  · Trend troponin  · Continue telemetry monitoring    Treatment discontinued and patient transitioned to comfort measures  [facility]  Assessment & Plan  · Presents with 3 days history of SOB and audible wheezing  · Typically wears 2-3 L NC at baseline - currently requiring 15L midflow   · Takes Singulair and mx inhalers including Symbicort, Trelegy Ellipta, Atrovent  · Recently was restarted on Theophylline and placed on Prednisone by PCP  · Received Solu Medrol 60 mg x1 in ED - given additional 65 mg to equal 125 mg   · Scheduled Solu Medrol 40 mg Q12  · Will hold home inhalers and order Xopenex/Atrovent/Pulmicort nebs  · Hold Theophylline for now  · Continue Singulair  · Wean O2 for SpO2 > 88%    Treatment discontinued and patient transitioned to comfort measures  High anion gap metabolic acidosis  Assessment & Plan  · AG 17 with normal bicarb on admission  · Suspect this is 2/2 lactic acidosis vs DEBI with uremia as well as theophylline use  · Beta hydroxybutyrate normal  · APAP and ASA levels normal  · Avoid theophylline at this time  · Trend AG, lactic acid    Treatment discontinued and patient transitioned to comfort measures  Lactic acidosis  Assessment & Plan  · Lactic acid 6 4 on admission, increased to 10 4, now downtrending at 2 8  · CT C/A/P results as above  · Trending lactic and liver function panel     Treatment discontinued and patient transitioned to comfort measures  DEBI (acute kidney injury) ([facility] )  Assessment & Plan  · Creatinine 1 34 on admission, now 1 97  · Baseline appears to be around 0 5 - 0 7  · Suspect this is in setting of decreased PO intake, diuretic use, theophylline use, sepsis  · Takes Lasix 40mg BID at home, given 40 IV Lasix x2 with minimal response   · Avoid nephrotoxic agents and hypotension  · Trend renal indices  · Strict I/O  · Daily weights    Treatment discontinued and patient transitioned to comfort measures      Type 2 diabetes mellitus without complication, without long-term current use of insulin [facility])  Assessment & Plan  Lab Results   Component Value Date    HGBA1C 9 2 (H) 01/16/2022       Recent Labs     01/18/22  1346 01/18/22  1541 01/18/22  1739 01/18/22 2002   POCGLU 204* 199* 273* 244*       Blood Sugar Average: Last 72 hrs:  (P) 508 [id number]     · Takes Metformin and Januvia at home - hold for now  · Insulin gtt  · Fingersticks Q2hrs    Treatment discontinued and patient transitioned to comfort measures  Acute liver failure  Assessment & Plan  Treatment discontinued and patient transitioned to comfort measures  Thrombocytopenia ([address] )  Assessment & Plan  Treatment discontinued and patient transitioned to comfort measures  Transaminitis  Assessment & Plan  · Differentials include ischemia, medication intake, congestive hepatopathy   · Acute hepatitis panel pending  · GI consult, appreciate input   · LFTs now downtrending, continue to monitor   · RUQ US with no significant findings  · Monitor coags     Treatment discontinued and patient transitioned to comfort measures  Essential hypertension  Assessment & Plan  · Takes Toprol XL, Cardizem, Lasix at home  · See plan above    Treatment discontinued and patient transitioned to comfort measures  Iron deficiency anemia  Assessment & Plan  · Hgb 8 9 on admission  · Baseline appears to be around 8-10  · Continue home iron supplementation  · Trend hgb  · Monitor for bleeding    Treatment discontinued and patient transitioned to comfort measures      Chronic diastolic CHF (congestive heart failure) (HCC)  Assessment & Plan  Wt Readings from Last 3 Encounters:   01/18/22 64 5 kg (142 lb 3 2 oz)   01/10/22 58 1 kg (128 lb)   11/01/21 59 5 kg (131 lb 3 2 oz)     · Echo in 02/2021 reveals EF 60% with mild concentric hypertrophy and G1DD; dilated LA; mild to moderate regurgitation  · Bilateral course crackles on physical exam  · Takes Lasix at home, given 40 IV x2 with minimal response   · Use caution with further fluid resuscitation   · Strict I/O  · Daily weights    Treatment discontinued and patient transitioned to comfort measures  Chronic respiratory failure (HCC)  Assessment & Plan  · Wears 2-3L NC at baseline  · Currently requiring 15 midflow  NC - diffuse wheezing noted on exam  · Continue plan as noted above and wean O2 for goal SpO2 > 88%    Treatment discontinued and patient transitioned to comfort measures  Gastroesophageal reflux disease without esophagitis  Assessment & Plan  · Continue home PPI    Treatment discontinued and patient transitioned to comfort measures  ----------------------------------------------------------------------------------------  HPI/24hr events: Patient with increased work of breathing and lethargy  Lasix 40 mg x 1 without effect  Discussed goals of care with [address] [place] [address] Road and transitioned patient to comfort measures  Patient appropriate for transfer out of the ICU today?: Patient does not meet criteria for referral to the ICU Follow-Up Clinic; referral has not been made      Disposition: Comfort Care  Code Status: Level 4 - Comfort Care  ---------------------------------------------------------------------------------------  SUBJECTIVE  ABELARDO- lethargic    Review of Systems  Review of systems was reviewed and negative unless stated above in HPI/24-hour events   ---------------------------------------------------------------------------------------  OBJECTIVE    Vitals   Vitals:    22 1700 22 1800 01/1822   BP: 94/62 97/54 125/69    Pulse: (!) 141 (!) 141 (!) 132 96   Resp: 19 21 21 (!) 24   Temp: 98 6 °F (37 °C) 99 1 °F (37 3 °C) 98 6 °F (37 °C)    TempSrc:       SpO2: 99% 100% 100% 95%   Weight:       Height:         Temp (24hrs), Av 6 °F (37 °C), Min:97 1 °F (36 2 °C), Max:99 5 °F (37 5 °C)  Current: Temperature: 98 6 °F (37 °C)          Respiratory:    Nasal Cannula O2 Flow Rate (L/min): 3 L/min    Invasive/non-invasive ventilation settings   Respiratory  Report   Lab Data (Last 4 hours)    None         O2/Vent Data (Last 4 hours)    None                Physical Exam  Constitutional:       General: She is in acute distress  Appearance: She is ill-appearing and toxic-appearing  Eyes:      Extraocular Movements: Extraocular movements intact  Conjunctiva/sclera: Conjunctivae normal       Pupils: Pupils are equal, round, and reactive to light  Cardiovascular:      Rate and Rhythm: Rhythm irregularly irregular  Pulmonary:      Effort: Respiratory distress present  Breath sounds: Rhonchi and rales present  Abdominal:      General: Bowel sounds are normal       Palpations: Abdomen is soft  Tenderness: There is no abdominal tenderness  Genitourinary:     Comments: Veloz patent concentrated yellow urine  Skin:     Coloration: Skin is jaundiced and mottled  Neurological:      GCS: GCS eye subscore is 3  GCS verbal subscore is 4  GCS motor subscore is 6  Motor: Weakness present               Laboratory and Diagnostics:  Results from last 7 days   Lab Units 01/18/22 0446 01/17/22  0534 01/16/22  0749   WBC Thousand/uL 13 68* 23 43* 16 19*   HEMOGLOBIN g/dL 7 5* 7 5* 8 9*   HEMATOCRIT % 26 3* 27 7* 31 5*   PLATELETS Thousands/uL 64* 216 313   BANDS PCT %  --   --  3   MONO PCT %  --   --  2*     Results from last 7 days   Lab Units 01/18/22 0446 01/17/22 2025 01/17/22  0534 01/16/22  0749   SODIUM mmol/L 130* 132* 130* 130*   POTASSIUM mmol/L 4 4 3 6 4 7 4 0   CHLORIDE mmol/L 89* 91* 91* 87*   CO2 mmol/L 20* 26 22 26   ANION GAP mmol/L 21* 15* 17* 17*   BUN mg/dL 62* 53* 50* 44*   CREATININE mg/dL 1 97* 1 64* 1 48* 1 34*   CALCIUM mg/dL 7 4* 7 5* 7 5* 10 3*   GLUCOSE RANDOM mg/dL 194* 82 114 414*   ALT U/L 2,413* 2,915* 2,925*  --    AST U/L 1,902* 3,989* 2,440*  --    ALK PHOS U/L 305* 233* 129*  --    ALBUMIN g/dL 3 1* 3 2* 3 1*  --    TOTAL BILIRUBIN mg/dL 2 71* 1 70* 1 41*  --      Results from last 7 days   Lab Units 01/17/22  0534 01/16/22  0749   MAGNESIUM mg/dL 2 7 2 2   PHOSPHORUS mg/dL 5 0*  --       Results from last 7 days   Lab Units 01/18/22  0446 01/17/22  0756 01/17/22  0105 01/16/22  1830 01/16/22  0749   INR  2 08*  --   --   --  1 15   PTT seconds 159* 80* 77* 30 25          Results from last 7 days   Lab Units 01/18/22  0446 01/17/22  2319 01/17/22  2025 01/17/22  1108 01/17/22  0756 01/17/22  0534 01/17/22  0232   LACTIC ACID mmol/L 2 8* 3 5* 3 7* 4 9* 9 4* 8 2* 7 6*     ABG:  Results from last 7 days   Lab Units 01/17/22 1958   Flaco 30 ART  7 383   PCO2 ART mm Hg 36 8   PO2 ART mm Hg 244 7*   HCO3 ART mmol/L 21 4*   BASE EXC ART mmol/L -3 3   ABG SOURCE  Radial, Left     VBG:  Results from last 7 days   Lab Units 01/17/22 1958   ABG SOURCE  Radial, Left     Results from last 7 days   Lab Units 01/18/22  0446 01/17/22  0756 01/16/22  0835   PROCALCITONIN ng/ml 4 64* 1 94* 0 21       Micro  Results from last 7 days   Lab Units 01/17/22  1315 01/17/22  0144 01/16/22  1455 01/16/22  0835   BLOOD CULTURE   --   --   --  No Growth at 48 hrs  No Growth at 48 hrs  URINE CULTURE   --   --  40,000-49,000 cfu/ml   --    MRSA CULTURE ONLY   --  Culture results to follow  --   --    LEGIONELLA URINARY ANTIGEN  Negative  --   --   --    STREP PNEUMONIAE ANTIGEN, URINE   --   --  Negative  --        EKG: Atrial Fib with rapid RVR  Imaging: I have personally reviewed pertinent reports  Intake and Output  I/O       01/17 0701 01/18 0700 01/18 0701 01/19 0700    P  O  25 0    I V  (mL/kg) 427 5 (6 6) 320 4 (5)    IV Piggyback 737 6 1011 2    Total Intake(mL/kg) 1190 1 (18 5) 1331 6 (20 6)    Urine (mL/kg/hr) 875 (0 6) 205 (0 1)    Total Output 875 205    Net +315 1 +1126 6                Height and Weights   Height: 5' (152 4 cm)     Body mass index is 27 77 kg/m²    Weight (last 2 days)     Date/Time Weight    01/18/22 0600 64 5 (142 2)    01/17/22 1613 59 (130) 01/17/22 0544 59 2 (130 51)            Nutrition       Diet Orders   (From admission, onward)             Start     Ordered    01/18/22 2205  Diet Regular; Pleasure Feed  Diet effective now        References:    Nutrtion Support Algorithm Enteral vs  Parenteral   Question Answer Comment   Diet Type Regular    Regular Pleasure Feed    RD to adjust diet per protocol? No        01/18/22 2206                  Active Medications  Scheduled Meds:  Current Facility-Administered Medications   Medication Dose Route Frequency Provider Last Rate    fentaNYL  50 mcg/hr Intravenous Continuous Sidra A Sedora, CRNP 50 mcg/hr (01/19/22 0127)    fentanyl citrate (PF)  50 mcg Intravenous Q1H PRN Sidra A Sedora, CRNP      glycopyrrolate  0 1 mg Intravenous Q1H PRN Sidra A Sedora, CRNP      haloperidol lactate  0 5 mg Intravenous Q2H PRN Sidra A Sedora, CRNP      metoprolol  5 mg Intravenous Q6H PRN Cheri Cuna, CRNP      ondansetron  4 mg Intravenous Q4H PRN Sidra A Sedora, CRNP       Continuous Infusions:  fentaNYL, 50 mcg/hr, Last Rate: 50 mcg/hr (01/19/22 0127)      PRN Meds:   fentanyl citrate (PF), 50 mcg, Q1H PRN  glycopyrrolate, 0 1 mg, Q1H PRN  haloperidol lactate, 0 5 mg, Q2H PRN  metoprolol, 5 mg, Q6H PRN  ondansetron, 4 mg, Q4H PRN        Invasive Devices Review  Invasive Devices  Report    Central Venous Catheter Line            CVC Central Lines 01/17/22 Triple 16cm 2 days          Peripheral Intravenous Line            Peripheral IV 01/16/22 Left Hand 2 days    Peripheral IV 01/16/22 Right Antecubital 2 days          Drain            Urethral Catheter Temperature probe 16 Fr  2 days                Rationale for remaining devices:  Veloz -comfort  ---------------------------------------------------------------------------------------  Advance Directive and Living Will: Yes    Power of : Yes  POLST:    ---------------------------------------------------------------------------------------  Care Time Delivered:   No Critical Care time spent       LEEROY Parson      Portions of the record may have been created with voice recognition software  Occasional wrong word or "sound a like" substitutions may have occurred due to the inherent limitations of voice recognition software    Read the chart carefully and recognize, using context, where substitutions have occurred

## 2022-01-19 NOTE — ASSESSMENT & PLAN NOTE
· Wears 2-3L NC at baseline  · Currently requiring 15 midflow  NC - diffuse wheezing noted on exam  · Continue plan as noted above and wean O2 for goal SpO2 > 88%    Treatment discontinued and patient transitioned to comfort measures

## 2022-01-19 NOTE — ASSESSMENT & PLAN NOTE
· Creatinine 1 34 on admission, now 1 97  · Baseline appears to be around 0 5 - 0 7  · Suspect this is in setting of decreased PO intake, diuretic use, theophylline use, sepsis  · Takes Lasix 40mg BID at home, given 40 IV Lasix x2 with minimal response   · Avoid nephrotoxic agents and hypotension  · Trend renal indices  · Strict I/O  · Daily weights    Treatment discontinued and patient transitioned to comfort measures

## 2022-01-19 NOTE — ASSESSMENT & PLAN NOTE
· Takes Toprol XL, Cardizem, Lasix at home  · See plan above    Treatment discontinued and patient transitioned to comfort measures

## 2022-01-19 NOTE — ASSESSMENT & PLAN NOTE
· Lactic acid 6 4 on admission, increased to 10 4, now downtrending at 2 8  · CT C/A/P results as above  · Trending lactic and liver function panel     Treatment discontinued and patient transitioned to comfort measures

## 2022-01-19 NOTE — ASSESSMENT & PLAN NOTE
Lab Results   Component Value Date    HGBA1C 9 2 (H) 01/16/2022       Recent Labs     01/18/22  1346 01/18/22  1541 01/18/22  1739 01/18/22 2002   POCGLU 204* 199* 273* 244*       Blood Sugar Average: Last 72 hrs:  (P) 321 [id number]     · Takes Metformin and Januvia at home - hold for now  · Insulin gtt  · Fingersticks Q2hrs    Treatment discontinued and patient transitioned to comfort measures

## 2022-01-19 NOTE — NURSING NOTE
Pt is now comfort care  Placed on midflow O2 and medicated for comfort  Respirations are very labored with use of accessory muscles  Audible gurgling to airway  Unable to clear with suctioning  CVP intact  Will monitor and medicate for comfort

## 2022-01-19 NOTE — PROGRESS NOTES
01/18/22 2009   Respiratory Assessment   Assessment Type Pre-treatment   General Appearance Alert; Awake   Respiratory Pattern Labored; Accessory muscle use;Orthopneic; Shallow; Tachypneic;Dyspnea at rest   Chest Assessment Chest expansion symmetrical   Bilateral Breath Sounds Diminished;Crackles;Coarse   Resp Comments pt remains labored on vapotherm 75% 35 will cont to monitor    O2 Device vaptherm    Non-Invasive Information   O2 Interface Device HFNC prongs  (vapotherm)   Non-Invasive Ventilation Mode HFNC (High flow)   SpO2 95 %   $ Pulse Oximetry Spot Check Charge Completed   Non-Invasive Settings   FiO2 (%) 75   Flow (lpm) 35   Temperature (Set) 33   Maintenance   Water bag changed No

## 2022-01-19 NOTE — PLAN OF CARE
Problem: PAIN - ADULT  Goal: Verbalizes/displays adequate comfort level or baseline comfort level  Description: Interventions:  - Encourage patient to monitor pain and request assistance  - Assess pain using appropriate pain scale  - Administer analgesics based on type and severity of pain and evaluate response  - Implement non-pharmacological measures as appropriate and evaluate response  - Consider cultural and social influences on pain and pain management  - Notify physician/advanced practitioner if interventions unsuccessful or patient reports new pain  Outcome: Progressing     Problem: INFECTION - ADULT  Goal: Absence or prevention of progression during hospitalization  Description: INTERVENTIONS:  - Assess and monitor for signs and symptoms of infection  - Monitor lab/diagnostic results  - Monitor all insertion sites, i e  indwelling lines, tubes, and drains  - Monitor endotracheal if appropriate and nasal secretions for changes in amount and color  - [place] appropriate cooling/warming therapies per order  - Administer medications as ordered  - Instruct and encourage patient and family to use good hand hygiene technique  - Identify and instruct in appropriate isolation precautions for identified infection/condition  Outcome: Progressing

## 2022-01-19 NOTE — ASSESSMENT & PLAN NOTE
· Presents with 3 days history of SOB and audible wheezing  · Typically wears 2-3 L NC at baseline - currently requiring 15L midflow   · Takes Singulair and mx inhalers including Symbicort, Trelegy Ellipta, Atrovent  · Recently was restarted on Theophylline and placed on Prednisone by PCP  · Received Solu Medrol 60 mg x1 in ED - given additional 65 mg to equal 125 mg   · Scheduled Solu Medrol 40 mg Q12  · Will hold home inhalers and order Xopenex/Atrovent/Pulmicort nebs  · Hold Theophylline for now  · Continue Singulair  · Wean O2 for SpO2 > 88%    Treatment discontinued and patient transitioned to comfort measures

## 2022-01-19 NOTE — ASSESSMENT & PLAN NOTE
· Hgb 8 9 on admission  · Baseline appears to be around 8-10  · Continue home iron supplementation  · Trend hgb  · Monitor for bleeding    Treatment discontinued and patient transitioned to comfort measures

## 2022-01-19 NOTE — PROGRESS NOTES
01/18/22 4795   Respiratory Assessment   Resp Comments pt removed from HFNC vapotherm as pt status made comfort care, placed on NC for comfort

## 2022-01-19 NOTE — ASSESSMENT & PLAN NOTE
· AG 17 with normal bicarb on admission  · Suspect this is 2/2 lactic acidosis vs DEBI with uremia as well as theophylline use  · Beta hydroxybutyrate normal  · APAP and ASA levels normal  · Avoid theophylline at this time  · Trend AG, lactic acid    Treatment discontinued and patient transitioned to comfort measures

## 2022-01-19 NOTE — ASSESSMENT & PLAN NOTE
· Differentials include ischemia, medication intake, congestive hepatopathy   · Acute hepatitis panel pending  · GI consult, appreciate input   · LFTs now downtrending, continue to monitor   · RUQ US with no significant findings  · Monitor coags     Treatment discontinued and patient transitioned to comfort measures

## 2022-01-19 NOTE — CONSULTS
Vancomycin has been discontinued  Patient has been transitioned to comfort care  Pharmacy will sign off on vancomycin consult at this time      Cira Novoa, MarielleD

## 2022-01-19 NOTE — ASSESSMENT & PLAN NOTE
Wt Readings from Last 3 Encounters:   01/18/22 64 5 kg (142 lb 3 2 oz)   01/10/22 58 1 kg (128 lb)   11/01/21 59 5 kg (131 lb 3 2 oz)     · Echo in 02/2021 reveals EF 60% with mild concentric hypertrophy and G1DD; dilated LA; mild to moderate regurgitation  · Bilateral course crackles on physical exam  · Takes Lasix at home, given 40 IV x2 with minimal response   · Use caution with further fluid resuscitation   · Strict I/O  · Daily weights    Treatment discontinued and patient transitioned to comfort measures

## 2022-01-19 NOTE — PLAN OF CARE
Problem: Potential for Falls  Goal: Patient will remain free of falls  Description: INTERVENTIONS:  - Educate patient/family on patient safety including physical limitations  - Instruct patient to call for assistance with activity   - Consult OT/PT to assist with strengthening/mobility   - Keep Call bell within reach  - Keep bed low and locked with side rails adjusted as appropriate  - Keep care items and personal belongings within reach  - Initiate and maintain comfort rounds  - Make Fall Risk Sign visible to staff  - Apply yellow socks and bracelet for high fall risk patients  - Consider moving patient to room near nurses station  Outcome: Progressing     Problem: PAIN - ADULT  Goal: Verbalizes/displays adequate comfort level or baseline comfort level  Description: Interventions:  - Encourage patient to monitor pain and request assistance  - Assess pain using appropriate pain scale  - Administer analgesics based on type and severity of pain and evaluate response  - Implement non-pharmacological measures as appropriate and evaluate response  - Consider cultural and social influences on pain and pain management  - Notify physician/advanced practitioner if interventions unsuccessful or patient reports new pain  Outcome: Progressing     Problem: SAFETY ADULT  Goal: Patient will remain free of falls  Description: INTERVENTIONS:  - Educate patient/family on patient safety including physical limitations  - Instruct patient to call for assistance with activity   - Consult OT/PT to assist with strengthening/mobility   - Keep Call bell within reach  - Keep bed low and locked with side rails adjusted as appropriate  - Keep care items and personal belongings within reach  - Initiate and maintain comfort rounds  - Make Fall Risk Sign visible to staff    - Apply yellow socks and bracelet for high fall risk patients  - Consider moving patient to room near nurses station  Outcome: Progressing

## 2022-01-19 NOTE — ACP (ADVANCE CARE PLANNING)
Critical Care Advanced Care Planning Note  Santa Osorio 82 y o  female MRN: [id number]  Unit/Bed#: ICU 05-01 Encounter: [id number]    Santa Osorio is a 82 y o  female requiring critical care evaluation and advanced care planning  The patient has chronic comorbidities, including but not limited to PAF on Eliquis, HTN, Diastolic heart failure, COPD on chronic 2-3LNC, DM2, GERD, which is now further complicated by the following acute conditions: acute hypoxic respiratory failure, acute kidney injury, acute liver failure, acute metabolic encephalopathy   Due to the severity of the patient's acute condition and/or the extent of chronic conditions, additional conversations pertaining to advanced care planning were required  Today's discussion, which was held over the phone, included Ravindra Callejas, and it was established that all stake holders understood the rationale for the advanced care planning  The patient was unable to participate in the discussion due to acute encephalopathy and acute respiratory failure  Summary of Discussion:  Discussed with Peggy Rangel ,patient's current increased work of breathing, rapid heart rate in the 150's and encephalopathy despite current treatment of amiodarone infusion, VApotherm oxygen and lasix  I discussed with her my concerns of her going into respiratory arrest and poor prognosis  Vladimir Gates stated that [facility] has deteriorated significantly over the past couple years and would not want to suffer  She decided it was in the best interest of patient to transition her to comfort measures and stated that patient's grand daughter Shelia Cantu was onboard with the decision  Patient transition to comfort measures  Total time spent, (15 ) minutes (2130 to 2145)        CODE STATUS: COMFORT CARE - Level 4  POA: Yes  POLST:        SIGNATURE: LEEROY Manzano  DATE: January 18, 2022  TIME: 9:48 PM

## 2022-01-21 LAB
BACTERIA BLD CULT: NORMAL
BACTERIA BLD CULT: NORMAL

## 2022-06-14 NOTE — PLAN OF CARE
Problem: Potential for Falls  Goal: Patient will remain free of falls  Description  INTERVENTIONS:  - Assess patient frequently for physical needs  -  Identify cognitive and physical deficits and behaviors that affect risk of falls    -  [place] fall precautions as indicated by assessment   - Educate patient/family on patient safety including physical limitations  - Instruct patient to call for assistance with activity based on assessment  - Modify environment to reduce risk of injury  - Consider OT/PT consult to assist with strengthening/mobility  Outcome: Progressing     Problem: PAIN - ADULT  Goal: Verbalizes/displays adequate comfort level or baseline comfort level  Description  Interventions:  - Encourage patient to monitor pain and request assistance  - Assess pain using appropriate pain scale  - Administer analgesics based on type and severity of pain and evaluate response  - Implement non-pharmacological measures as appropriate and evaluate response  - Consider cultural and social influences on pain and pain management  - Notify physician/advanced practitioner if interventions unsuccessful or patient reports new pain  Outcome: Progressing     Problem: INFECTION - ADULT  Goal: Absence or prevention of progression during hospitalization  Description  INTERVENTIONS:  - Assess and monitor for signs and symptoms of infection  - Monitor lab/diagnostic results  - Monitor all insertion sites, i e  indwelling lines, tubes, and drains  - Monitor endotracheal if appropriate and nasal secretions for changes in amount and color  - [place] appropriate cooling/warming therapies per order  - Administer medications as ordered  - Instruct and encourage patient and family to use good hand hygiene technique  - Identify and instruct in appropriate isolation precautions for identified infection/condition  Outcome: Progressing  Goal: Absence of fever/infection during neutropenic period  Description  INTERVENTIONS:  - Monitor While we try to accommodate patient requests, our priority is to schedule treatment according to Doctor's orders and site availability  1  Does the Provider use the intake sheet or checkout note? Check out  2  What would be a preferred day of the week that would work best for your infusion appointment?tuesday  3    4  Do you prefer mornings or afternoons for your appointments? Afternoon  5    6  Are there any days or dates that do not work for your schedule, including any upcoming vacations?nope lol  7  We are going to try our best to schedule you at the infusion center closest to your home  In the event that we are unable to what would be your next preferred infusion site or sites? 1  [facility]  2    3    8  Do you have transportation to take you to all of your appointments?  yes  Would you like the infusion center to draw labs from your port? (disregard if patient doesn't have a port or need labs for infusion appointment)no WBC    Outcome: Progressing     Problem: SAFETY ADULT  Goal: Maintain or return to baseline ADL function  Description  INTERVENTIONS:  -  Assess patient's ability to carry out ADLs; assess patient's baseline for ADL function and identify physical deficits which impact ability to perform ADLs (bathing, care of mouth/teeth, toileting, grooming, dressing, etc )  - Assess/evaluate cause of self-care deficits   - Assess range of motion  - Assess patient's mobility; develop plan if impaired  - Assess patient's need for assistive devices and provide as appropriate  - Encourage maximum independence but intervene and supervise when necessary  - Involve family in performance of ADLs  - Assess for home care needs following discharge   - Consider OT consult to assist with ADL evaluation and planning for discharge  - Provide patient education as appropriate  Outcome: Progressing  Goal: Maintain or return mobility status to optimal level  Description  INTERVENTIONS:  - Assess patient's baseline mobility status (ambulation, transfers, stairs, etc )    - Identify cognitive and physical deficits and behaviors that affect mobility  - Identify mobility aids required to assist with transfers and/or ambulation (gait belt, sit-to-stand, lift, walker, cane, etc )  - Colon fall precautions as indicated by assessment  - Record patient progress and toleration of activity level on Mobility SBAR; progress patient to next Phase/Stage  - Instruct patient to call for assistance with activity based on assessment  - Consider rehabilitation consult to assist with strengthening/weightbearing, etc   Outcome: Progressing     Problem: DISCHARGE PLANNING  Goal: Discharge to home or other facility with appropriate resources  Description  INTERVENTIONS:  - Identify barriers to discharge w/patient and caregiver  - Arrange for needed discharge resources and transportation as appropriate  - Identify discharge learning needs (meds, wound care, etc )  - Arrange for interpretive services to assist at discharge as needed  - Refer to Case Management Department for coordinating discharge planning if the patient needs post-hospital services based on physician/advanced practitioner order or complex needs related to functional status, cognitive ability, or social support system  Outcome: Progressing     Problem: Knowledge Deficit  Goal: Patient/family/caregiver demonstrates understanding of disease process, treatment plan, medications, and discharge instructions  Description  Complete learning assessment and assess knowledge base    Interventions:  - Provide teaching at level of understanding  - Provide teaching via preferred learning methods  Outcome: Progressing     Problem: GASTROINTESTINAL - ADULT  Goal: Minimal or absence of nausea and/or vomiting  Description  INTERVENTIONS:  - Administer IV fluids if ordered to ensure adequate hydration    - Administer ordered antiemetic medications as needed  - Provide nonpharmacologic comfort measures as appropriate  - Consider nutrition services referral to assist patient with adequate nutrition and appropriate food choices   Outcome: Progressing  Goal: Maintains or returns to baseline bowel function  Description  INTERVENTIONS:  - Assess bowel function  - Encourage oral fluids to ensure adequate hydration  - Administer IV fluids if ordered to ensure adequate hydration  - Administer ordered medications as needed  - Encourage mobilization and activity  - Consider nutritional services referral to assist patient with adequate nutrition and appropriate food choices  Outcome: Progressing  Goal: Maintains adequate nutritional intake  Description  INTERVENTIONS:  - Monitor percentage of each meal consumed  - Identify factors contributing to decreased intake, treat as appropriate  - Assist with meals as needed  - Monitor I&O, weight, and lab values if indicated  - Obtain nutrition services referral as needed  Outcome: Progressing

## 2022-09-21 NOTE — SOCIAL WORK
Chart reviewed by Cm, assessment was completed at the bedside with Pt and Charisma Mejia the [facility], pt is now agreeable to going for some rehab, pt did allow [facility] to be set up for d/c on 10/10/19 and a walker was issued as recommended, pt went home and she stated she started with diarrhea and was very weak, Charisma Mejia and therapist were at the home and then Charisma Mejia brought the pt back to the hospital, pt works as a , she has oxygen from [place], pt and Charisma Mejia were given a list of snf's, pt want s to be close it was given as per zip code   Pt is a negative pasar as per thenpatinet, no psych stays, pt does get anxious at times, Charisma Mejia and pt gave me 4 choices, [facility], [place](pt's son is there) [address], permission was now given to send the referrals, pt is on precautions and c-diff is pending, cm will continue to follow, pt is not refusing snf rehab now ,  Patient/caregiver received discharge checklist   Content reviewed  Patient/caregiver encouraged to participate in discharge plan of care prior to discharge home  CM reviewed d/c planning process including the following: identifying help at home, patient preference for d/c planning needs, availability of treatment team to discuss questions or concerns patient and/or family may have regarding understanding medications and recognizing signs and symptoms once discharged  CM also encouraged patient to follow up with all recommended appointments after discharge  Patient advised of importance for patient and family to participate in managing patients medical well being  Detail Level: Zone

## 2023-05-12 NOTE — PROGRESS NOTES
Appt scheduled Griseofulvin Pregnancy And Lactation Text: This medication is Pregnancy Category X and is known to cause serious birth defects. It is unknown if this medication is excreted in breast milk but breast feeding should be avoided.

## 2024-03-07 NOTE — NURSING NOTE
Dressing changed on wrist  Site cleansed with NSS  Minimal amount of drainage  Applied new sterile dressing  Pt tolerated well  No

## 2024-03-14 NOTE — QUICK NOTE
Nurse-Patient-Provider rounds were completed with the patient's nurse today, Primitivo Baker  We discussed the plan is to keep her NPO except for sips with medications  Continue current medication regimen per Cardiology evaluation and recommendations with improved heart rate control noted over the last 12-24 hours  Anticipate re-attempt at EGD today; this was discussed with GI with endoscopy time pending  Tentative plan to proceed with upper GI/swallow study to evaluate for gastric emptying if EGD unsuccessful today  Continue PPI indefinitely  Continue step-down level of care at this time  If patient tolerates the endoscopic procedure well and has no further issues with uncontrolled tachycardia or hypotension, may consider transition to med/surg level of care later today  We reviewed all of the invasive devices/lines/telemetry orders   - None  DVT Prophylaxis:  - Subcutaneous heparin and SCDs  Pain Assessment / Plan:  - Continue current analgesic regimen  Mobility Assessment / Plan:  - Activity as tolerated  Goals / Barriers for discharge:  - Not yet appropriate for discharge while awaiting repeat EGD and/or further workup for gastric outlet obstruction   - Case management following; case and discharge needs discussed  All questions and concerns were addressed  I spent greater than 23 minutes reviewing the plan with the patient and the nurse, and coordinating care for the day      Constantino Blanco PA-C  1/30/2020 08:41 AM 98.3

## 2024-11-05 NOTE — ASSESSMENT & PLAN NOTE
· Without acute exacerbation  · Continue with inhalers which include Spiriva and Symbicort  · Continue theophylline and Singulair  · Continue patient's chronic steroid therapy-prednisone 5 mg p o  B i d   · Continue concomitant Fosamax, calcium carbonate and vitamin D therapy  · Continue respiratory protocol-continue p r n   Levalbuterol Alert

## 2025-01-13 NOTE — ASSESSMENT & PLAN NOTE
~Repeat CT chest May of this year- non contrast    ~Call [facility] scheduling at [phone] to schedule testing    ~We will look into your [place] prior authorization. We discussed we may need to start Zetia     Cardiac medications reviewed including indications and pertinent side effects. Medication list updated at this visit.   Patient verbalizes understanding of the need for treatment and education has been provided at today's visit. Additional education material will be provided in after visit summary.    Check blood pressure and heart rate at home a few times per week- keep a log with dates and times and bring to office visit   Regular exercise and following a healthy diet encouraged   Follow up with me in 1 year    · Currently rate controlled  · Continue home Eliquis

## 2025-02-28 NOTE — ASSESSMENT & PLAN NOTE
As the medications are providing relief we will follow-up as scheduled as her next office visit with Agata   · Presented in AF w/RVR in 160s  · Suspect this is in setting of sepsis vs COPD exacerbation  · Hx of pAF - on Cardizem 120 mg daily, Toprol XL 75 mg daily; AC w/Eliquis  · Received IV Cardizem push 10 mg x1, followed by 20 mg x1 and subsequently started on Cardizem gtt in ED 1/16-Received 5mg lopressor x 2 with improvement in HR(1/16)  · transitioned off drip during dayshift 1/17  · Received 15 mg IV Cardizem bolus and restarted on Cardizem drip at 7 5mg/hr  · Troponin 32 - 30  · Continue Heparin gtt now  · Titrate Cardizem gtt for goal HR < 120  · Consider Amiodarone gtt if unable to obtain rate control   · Optimize electrolytes  · Trend troponin  · Continue telemetry monitoring    Treatment discontinued and patient transitioned to comfort measures

## (undated) DEVICE — SINGLE-USE POLYPECTOMY SNARE: Brand: ROTATABLE SNARE

## (undated) DEVICE — NEEDLE 25G X 1 1/2

## (undated) DEVICE — SYRINGE 5ML LL

## (undated) DEVICE — TRANSPOSAL ULTRAFLEX DUO/QUAD ULTRA CART MANIFOLD

## (undated) DEVICE — FLEXIBLE ADHESIVE BANDAGE,X-LARGE: Brand: CURITY

## (undated) DEVICE — INTENDED FOR TISSUE SEPARATION, AND OTHER PROCEDURES THAT REQUIRE A SHARP SURGICAL BLADE TO PUNCTURE OR CUT.: Brand: BARD-PARKER SAFETY BLADES SIZE 15, STERILE

## (undated) DEVICE — PLASTIC ADHESIVE BANDAGE: Brand: CURITY

## (undated) DEVICE — NEEDLE SPINAL 22G X 3.5IN  QUINCKE

## (undated) DEVICE — GLOVE SRG BIOGEL 7.5

## (undated) DEVICE — ELECTRODE BLADE MOD E-Z CLEAN 2.5IN 6.4CM -0012M

## (undated) DEVICE — SYRINGE 3ML LL

## (undated) DEVICE — KIT ENDOSCOPY BASIC

## (undated) DEVICE — 3000CC GUARDIAN II: Brand: GUARDIAN

## (undated) DEVICE — PLUMEPEN PRO 10FT

## (undated) DEVICE — SUT VICRYL 2-0 REEL 54 IN J286G

## (undated) DEVICE — THE SINGLE USE BIOGUARD AIR WATER VALVE, OLYMPUS IS USED TO CONTROL THE AIR WATER FUNCTION OF AN ENDOSCOPE DURING GI ENDOSCOPIC PROCEDURES.: Brand: BIOGUARD

## (undated) DEVICE — GLOVE SRG BIOGEL 8

## (undated) DEVICE — SUT SILK 2-0 SH 30 IN K833H

## (undated) DEVICE — THE BIOSHIELD BIOPSY VALVE - STERILE IS USED TO COVER THE OPENING TO THE BIOPSY/SUCTION CHANNEL INLET OF A GASTROINTESTINAL ENDOSCOPE. IT PROVIDES ACCESS FOR ENDOSCOPIC DEVICE PASSAGE AND EXCHANGE, HELPS MAINTAIN INSUFFLATION AND MINIMIZES LEAKAGE OF BIOMATERIAL FROM THE BIOPSY PORT THROUGHOUT THE GASTROINTESTINAL ENDOSCOPIC PROCEDURE.: Brand: BIOSHIELD

## (undated) DEVICE — SYRINGE 50ML LL

## (undated) DEVICE — PROXIMATE LINEAR CUTTER RELOAD, BLUE, 75MM: Brand: PROXIMATE

## (undated) DEVICE — CHLORAPREP HI-LITE 10.5ML ORANGE

## (undated) DEVICE — LIGACLIP MCA MULTIPLE CLIP APPLIERS, 20 MEDIUM CLIPS: Brand: LIGACLIP

## (undated) DEVICE — THE TORRENT IRRIGATION SCOPE CONNECTOR IS USED WITH THE TORRENT IRRIGATION TUBING TO PROVIDE IRRIGATION FLUIDS SUCH AS STERILE WATER DURING GASTROINTESTINAL ENDOSCOPIC PROCEDURES WHEN USED IN CONJUNCTION WITH AN IRRIGATION PUMP (OR ELECTROSURGICAL UNIT).: Brand: TORRENT

## (undated) DEVICE — INTENDED FOR TISSUE SEPARATION, AND OTHER PROCEDURES THAT REQUIRE A SHARP SURGICAL BLADE TO PUNCTURE OR CUT.: Brand: BARD-PARKER SAFETY BLADES SIZE 10, STERILE

## (undated) DEVICE — SUT VICRYL 3-0 SH 27 IN J416H

## (undated) DEVICE — GLOVE INDICATOR PI UNDERGLOVE SZ 7.5 BLUE

## (undated) DEVICE — Device

## (undated) DEVICE — BETHLEHEM MAJOR GENERAL PACK: Brand: CARDINAL HEALTH

## (undated) DEVICE — CHLORAPREP HI-LITE 26ML ORANGE

## (undated) DEVICE — GROUNDING PAD UNIVERSAL SLW

## (undated) DEVICE — TRAY FOLEY 16FR URIMETER SURESTEP

## (undated) DEVICE — NEEDLE 18 G X 1 1/2

## (undated) DEVICE — SUT SILK 3-0 SH 30 IN K832H

## (undated) DEVICE — GAUZE SPONGES,16 PLY: Brand: CURITY

## (undated) DEVICE — SUT SILK 2-0 TIES 144 IN LA55G

## (undated) DEVICE — ASTOUND IMPERVIOUS SURGICAL GOWN: Brand: CONVERTORS

## (undated) DEVICE — 3M™ TEGADERM™ TRANSPARENT FILM DRESSING FRAME STYLE, 1627, 4 IN X 10 IN (10 CM X 25 CM), 20/CT 4CT/CASE: Brand: 3M™ TEGADERM™

## (undated) DEVICE — POOLE SUCTION HANDLE: Brand: CARDINAL HEALTH

## (undated) DEVICE — THE TORRENT IRRIGATION TUBING IS INTENDED TO PROVIDE IRRIGATION VIA IRRIGATION FLUIDS, SUCH AS STERILE WATER, DURING GASTROINTESTINAL ENDOSCOPIC PROCEDURES WHEN USED IN CONJUNCTION WITH AN IRRIGATION PUMP OR ELECTROSURGICAL UNIT.: Brand: TORRENT

## (undated) DEVICE — PROXIMATE RELOADABLE LINEAR CUTTER WITH SAFETY LOCK-OUT, 75MM: Brand: PROXIMATE